# Patient Record
Sex: FEMALE | Race: BLACK OR AFRICAN AMERICAN | NOT HISPANIC OR LATINO | Employment: OTHER | URBAN - METROPOLITAN AREA
[De-identification: names, ages, dates, MRNs, and addresses within clinical notes are randomized per-mention and may not be internally consistent; named-entity substitution may affect disease eponyms.]

---

## 2017-01-06 RX ORDER — INSULIN GLARGINE 100 [IU]/ML
INJECTION, SOLUTION SUBCUTANEOUS
Qty: 1 BOX | Refills: 0 | OUTPATIENT
Start: 2017-01-06

## 2017-01-09 ENCOUNTER — TELEPHONE (OUTPATIENT)
Dept: FAMILY MEDICINE | Facility: CLINIC | Age: 57
End: 2017-01-09

## 2017-01-11 RX ORDER — INSULIN GLARGINE 100 [IU]/ML
INJECTION, SOLUTION SUBCUTANEOUS
Qty: 4 BOX | Refills: 5 | Status: SHIPPED | OUTPATIENT
Start: 2017-01-11 | End: 2017-01-31 | Stop reason: SDUPTHER

## 2017-01-30 ENCOUNTER — DOCUMENTATION ONLY (OUTPATIENT)
Dept: FAMILY MEDICINE | Facility: CLINIC | Age: 57
End: 2017-01-30

## 2017-01-30 NOTE — PROGRESS NOTES
Pre-Visit Chart Review  For Appointment Scheduled on 1-31-17    Health Maintenance Due   Topic Date Due    TETANUS VACCINE  01/08/1976    Colonoscopy  01/08/2008    Eye Exam  06/01/2016    Lipid Panel  06/05/2016    Hemoglobin A1c  07/22/2016    Influenza Vaccine  08/01/2016    Foot Exam  01/27/2017    Urine Microalbumin  01/27/2017

## 2017-01-31 ENCOUNTER — OFFICE VISIT (OUTPATIENT)
Dept: FAMILY MEDICINE | Facility: CLINIC | Age: 57
End: 2017-01-31
Payer: MEDICARE

## 2017-01-31 VITALS
BODY MASS INDEX: 27.2 KG/M2 | DIASTOLIC BLOOD PRESSURE: 100 MMHG | WEIGHT: 134.94 LBS | SYSTOLIC BLOOD PRESSURE: 190 MMHG | TEMPERATURE: 98 F | HEIGHT: 59 IN | HEART RATE: 73 BPM

## 2017-01-31 DIAGNOSIS — D50.9 IRON DEFICIENCY ANEMIA, UNSPECIFIED IRON DEFICIENCY ANEMIA TYPE: ICD-10-CM

## 2017-01-31 DIAGNOSIS — I10 HYPERTENSION, UNCONTROLLED: ICD-10-CM

## 2017-01-31 DIAGNOSIS — E11.9 TYPE II OR UNSPECIFIED TYPE DIABETES MELLITUS WITHOUT MENTION OF COMPLICATION, NOT STATED AS UNCONTROLLED: Primary | ICD-10-CM

## 2017-01-31 DIAGNOSIS — J44.9 CHRONIC OBSTRUCTIVE PULMONARY DISEASE, UNSPECIFIED COPD TYPE: ICD-10-CM

## 2017-01-31 PROCEDURE — 3080F DIAST BP >= 90 MM HG: CPT | Mod: S$GLB,,, | Performed by: FAMILY MEDICINE

## 2017-01-31 PROCEDURE — 99214 OFFICE O/P EST MOD 30 MIN: CPT | Mod: S$GLB,,, | Performed by: FAMILY MEDICINE

## 2017-01-31 PROCEDURE — 2022F DILAT RTA XM EVC RTNOPTHY: CPT | Mod: S$GLB,,, | Performed by: FAMILY MEDICINE

## 2017-01-31 PROCEDURE — 3077F SYST BP >= 140 MM HG: CPT | Mod: S$GLB,,, | Performed by: FAMILY MEDICINE

## 2017-01-31 PROCEDURE — 99999 PR PBB SHADOW E&M-EST. PATIENT-LVL III: CPT | Mod: PBBFAC,,, | Performed by: FAMILY MEDICINE

## 2017-01-31 PROCEDURE — 3045F PR MOST RECENT HEMOGLOBIN A1C LEVEL 7.0-9.0%: CPT | Mod: S$GLB,,, | Performed by: FAMILY MEDICINE

## 2017-01-31 PROCEDURE — 4010F ACE/ARB THERAPY RXD/TAKEN: CPT | Mod: S$GLB,,, | Performed by: FAMILY MEDICINE

## 2017-01-31 PROCEDURE — 99499 UNLISTED E&M SERVICE: CPT | Mod: S$PBB,,, | Performed by: FAMILY MEDICINE

## 2017-01-31 PROCEDURE — 1159F MED LIST DOCD IN RCRD: CPT | Mod: S$GLB,,, | Performed by: FAMILY MEDICINE

## 2017-01-31 RX ORDER — AMLODIPINE AND VALSARTAN 10; 320 MG/1; MG/1
1 TABLET ORAL DAILY
Qty: 90 TABLET | Refills: 3 | Status: SHIPPED | OUTPATIENT
Start: 2017-01-31 | End: 2018-01-24 | Stop reason: SDUPTHER

## 2017-01-31 RX ORDER — METOPROLOL SUCCINATE 50 MG/1
50 TABLET, EXTENDED RELEASE ORAL DAILY
Qty: 90 TABLET | Refills: 3 | Status: SHIPPED | OUTPATIENT
Start: 2017-01-31 | End: 2017-02-17 | Stop reason: DRUGHIGH

## 2017-01-31 RX ORDER — INSULIN GLARGINE 100 [IU]/ML
INJECTION, SOLUTION SUBCUTANEOUS
Qty: 12 BOX | Refills: 3 | Status: SHIPPED | OUTPATIENT
Start: 2017-01-31 | End: 2017-03-03 | Stop reason: SDUPTHER

## 2017-01-31 RX ORDER — METFORMIN HYDROCHLORIDE 500 MG/1
TABLET, EXTENDED RELEASE ORAL
Qty: 360 TABLET | Refills: 1 | Status: SHIPPED | OUTPATIENT
Start: 2017-01-31 | End: 2018-01-24 | Stop reason: SDUPTHER

## 2017-01-31 RX ORDER — TIOTROPIUM BROMIDE 18 UG/1
18 CAPSULE ORAL; RESPIRATORY (INHALATION) DAILY
Qty: 90 CAPSULE | Refills: 1 | Status: SHIPPED | OUTPATIENT
Start: 2017-01-31 | End: 2017-05-17 | Stop reason: SDUPTHER

## 2017-01-31 RX ORDER — HYDROCHLOROTHIAZIDE 25 MG/1
25 TABLET ORAL DAILY
Qty: 90 TABLET | Refills: 3 | Status: SHIPPED | OUTPATIENT
Start: 2017-01-31 | End: 2017-03-31

## 2017-01-31 NOTE — PROGRESS NOTES
Subjective:       Patient ID: Steff Johnson is a 57 y.o. female.    Chief Complaint: Establish Care    Patient Active Problem List   Diagnosis    Hypertension    Diabetes mellitus    COPD (chronic obstructive pulmonary disease)    Type II or unspecified type diabetes mellitus without mention of complication, not stated as uncontrolled    Chronic pain    Visit for screening mammogram    Screening for colon cancer    BMI 27.0-27.9,adult   Patient here to establish care.  Has not been on any of her regular medications other than Lantus at 40 units per day.  BS ranging 100-325 depending on her diet.  Has h/o non compliance and poor control of her chronic conditions.  LOV 2/16    HPI  Review of Systems   Constitutional: Negative for fatigue and unexpected weight change.   Respiratory: Negative for chest tightness and shortness of breath.    Cardiovascular: Negative for chest pain, palpitations and leg swelling.   Gastrointestinal: Negative for abdominal pain.   Endocrine: Negative for polydipsia, polyphagia and polyuria.   Musculoskeletal: Negative for arthralgias.   Neurological: Negative for dizziness, syncope, light-headedness and headaches.       Objective:      Physical Exam   Constitutional: She is oriented to person, place, and time. She appears well-developed and well-nourished.   Cardiovascular: Normal rate, regular rhythm and normal heart sounds.    Pulmonary/Chest: Effort normal and breath sounds normal.   Musculoskeletal: She exhibits no edema.   Neurological: She is alert and oriented to person, place, and time.   Skin: Skin is warm and dry.   Psychiatric: She has a normal mood and affect.   Nursing note and vitals reviewed.      Assessment:       1. Type II or unspecified type diabetes mellitus without mention of complication, not stated as uncontrolled    2. Hypertension, uncontrolled    3. Chronic obstructive pulmonary disease, unspecified COPD type    4. Iron deficiency anemia, unspecified iron  deficiency anemia type        Plan:       1. Hypertension, uncontrolled  D/c clonidine.  Restart:  - amlodipine-valsartan (EXFORGE)  mg per tablet; Take 1 tablet by mouth once daily.  Dispense: 90 tablet; Refill: 3  - hydrochlorothiazide (HYDRODIURIL) 25 MG tablet; Take 1 tablet (25 mg total) by mouth once daily.  Dispense: 90 tablet; Refill: 3  - metoprolol succinate (TOPROL-XL) 50 MG 24 hr tablet; Take 1 tablet (50 mg total) by mouth once daily.  Dispense: 90 tablet; Refill: 3  I counseled the patient on HTN education, management and recommendations.  I recommended weight loss toward a BMI < 25, avoidance of salt and the DASH diet, regular cardio exercise a minimum of 150 minutes per week and medications if indicated.  Printed materials were given.    2. Type II or unspecified type diabetes mellitus without mention of complication, not stated as uncontrolled  D/c tradjenta.  Restart:  - metformin (GLUCOPHAGE-XR) 500 MG 24 hr tablet; 2 TABLETS TWICE DAILY WITH MEALS  Dispense: 360 tablet; Refill: 1  Continue  - insulin glargine (LANTUS SOLOSTAR) 100 unit/mL (3 mL) InPn pen; ADMINISTER 40 UNITS UNDER THE SKIN DAILY  Dispense: 12 Box; Refill: 3  - Hemoglobin A1c; Future  - Comprehensive metabolic panel; Future  - Microalbumin/creatinine urine ratio; Future  Keep BS log and check 4 times day premeal and qhs.      3. Chronic obstructive pulmonary disease, unspecified COPD type  Restart:  - tiotropium (SPIRIVA WITH HANDIHALER) 18 mcg inhalation capsule; Inhale 1 capsule (18 mcg total) into the lungs once daily.  Dispense: 90 capsule; Refill: 1    4. Iron deficiency anemia, unspecified iron deficiency anemia type  Screen and treat as indicated:    - CBC auto differential; Future  - Ferritin; Future  - Iron and TIBC; Future      [facility] goal documentation:  Patient readiness: acceptance and barriers:readiness  During the course of the visit the patient was educated and counseled about the following: Diabetes:   Discussed general issues about diabetes pathophysiology and management.  Hypertension:   Dietary sodium restriction.  Regular aerobic exercise.  Goals: Diabetes: Maintain Hemoglobin A1C below 7 and Hypertension: Reduce Blood Pressure  Goal/Outcomes met:none  The following self management tools provided:none  Patient Instructions (the written plan) was given to the patient/family: Yes  Time spent with patient: 40 minutes    Patient with be reevaluated in 3 months or sooner prn.  reeval 2 weeks with KLARISSA Queen for BP and BS recheck.      Greater than 50% of this visit was spent counseling as described in above documentation:Yes

## 2017-01-31 NOTE — PATIENT INSTRUCTIONS

## 2017-01-31 NOTE — MR AVS SNAPSHOT
[facility]  [address]  [place] [address]  Phone: [phone]  Fax: [phone]                  Steff Johnson   2017 11:00 AM   Office Visit    Description:  Female : 1960   Provider:  Cristina Ren MD   Department:  [facility]           Reason for Visit     Establish Care           Diagnoses this Visit        Comments    Type II or unspecified type diabetes mellitus without mention of complication, not stated as uncontrolled    -  Primary     Hypertension, uncontrolled         Chronic obstructive pulmonary disease, unspecified COPD type         Iron deficiency anemia, unspecified iron deficiency anemia type                To Do List           Future Appointments        Provider Department Dept Phone    2017 11:00 AM Cristina Ren MD [facility] [phone]    2017 8:00 AM LAB, [facility] [phone]    2017 8:15 AM SPECIMEN, [facility] - Lab [phone]    2017 9:00 AM Rita Queen PA-C [facility] [phone]    2017 9:40 AM Cristina Ren MD [facility] [phone]      Goals (5 Years of Data)     None      Follow-Up and Disposition     Return in about 3 months (around 2017).       These Medications        Disp Refills Start End    amlodipine-valsartan (EXFORGE)  mg per tablet 90 tablet 3 2017     Take 1 tablet by mouth once daily. - Oral    Pharmacy: [facility] [address] [place] [address] Ph #: [phone]       hydrochlorothiazide (HYDRODIURIL) 25 MG tablet 90 tablet 3 2017     Take 1 tablet (25 mg total) by mouth once daily. - Oral    Pharmacy: [facility] [address] [place] [address] [facility] Ph #: [phone]       metoprolol succinate (TOPROL-XL) 50 MG 24 hr tablet 90 tablet 3 2017    Take 1 tablet (50 mg  total) by mouth once daily. - Oral    Pharmacy: [address] Ph #: [phone]       metformin (GLUCOPHAGE-XR) 500 MG 24 hr tablet 360 tablet 1 1/31/2017     2 TABLETS TWICE DAILY WITH MEALS    Pharmacy: [address] Ph #: [phone]       insulin glargine (LANTUS SOLOSTAR) 100 unit/mL (3 mL) InPn pen 12 Box 3 1/31/2017     ADMINISTER 40 UNITS UNDER THE SKIN DAILY    Pharmacy: [address] Ph #: [phone]       tiotropium (SPIRIVA WITH HANDIHALER) 18 mcg inhalation capsule 90 capsule 1 1/31/2017     Inhale 1 capsule (18 mcg total) into the lungs once daily. - Inhalation    Pharmacy: [address] Ph #: [phone]         [facility]     [facility] - 24/7 Assistance  Registered nurses in the [facility] provide clinical advisement, health education, appointment booking, and other advisory services.  Call for this free service at [phone].             Medications           Message regarding Medications     Verify the changes and/or additions to your medication regime listed below are the same as discussed with your clinician today.  If any of these changes or additions are incorrect, please notify your healthcare provider.        STOP taking these medications     cloNIDine (CATAPRES) 0.3 MG tablet Take 1 tablet (0.3 mg total) by mouth 2 (two) times daily.    linagliptin (TRADJENTA) 5 mg Tab tablet Take 1 tablet (5 mg total) by mouth once daily.    estrogens conjugated, synthetic B, (ENJUVIA) 0.625 MG tablet Take 0.625 mg by mouth once daily.      ferrous sulfate 325 (65 FE) MG EC tablet Take 325 mg by mouth 3 (three) times daily with meals.           Verify that the below  "list of medications is an accurate representation of the medications you are currently taking.  If none reported, the list may be blank. If incorrect, please contact your healthcare provider. Carry this list with you in case of emergency.           Current Medications     amlodipine-valsartan (EXFORGE)  mg per tablet Take 1 tablet by mouth once daily.    BD INSULIN PEN NEEDLE UF SHORT 31 gauge x 5/16" Ndle USE EVERY DAY WITH LANTUS    hydrochlorothiazide (HYDRODIURIL) 25 MG tablet Take 1 tablet (25 mg total) by mouth once daily.    hydrocodone-acetaminophen 10-325mg (NORCO)  mg Tab     insulin glargine (LANTUS SOLOSTAR) 100 unit/mL (3 mL) InPn pen ADMINISTER 40 UNITS UNDER THE SKIN DAILY    lidocaine-prilocaine (EMLA) cream     metformin (GLUCOPHAGE-XR) 500 MG 24 hr tablet 2 TABLETS TWICE DAILY WITH MEALS    metoprolol succinate (TOPROL-XL) 50 MG 24 hr tablet Take 1 tablet (50 mg total) by mouth once daily.    tiotropium (SPIRIVA WITH HANDIHALER) 18 mcg inhalation capsule Inhale 1 capsule (18 mcg total) into the lungs once daily.    zolpidem (AMBIEN) 10 mg tablet Take 10 mg by mouth nightly as needed.             Clinical Reference Information           Vital Signs - Last Recorded  Most recent update: 1/31/2017 10:28 AM by Kathy Orozco MA    BP Pulse Temp Ht Wt BMI    (!) 190/100 (BP Location: Right arm, Patient Position: Sitting, BP Method: Manual) 73 98 °F (36.7 °C) (Oral) 4' 11" (1.499 m) 61.2 kg (134 lb 14.7 oz) 27.25 kg/m2      Blood Pressure          Most Recent Value    BP  (!)  190/100      Allergies as of 1/31/2017     No Known Allergies      Immunizations Administered on Date of Encounter - 1/31/2017     None      Orders Placed During Today's Visit     Future Labs/Procedures Expected by Expires    CBC auto differential  1/31/2017 4/1/2018    Comprehensive metabolic panel  1/31/2017 4/1/2018    Ferritin  1/31/2017 4/1/2018    Hemoglobin A1c  1/31/2017 4/1/2018    Iron and TIBC  1/31/2017 " 4/1/2018    Microalbumin/creatinine urine ratio  1/31/2017 4/1/2018      [facility] Sign-Up     Activating your [facility] account is as easy as 1-2-3!     1) Visit [email or web address], select Sign Up Now, enter this activation code and your date of birth, then select Next.  [email or web address]  Expires: 3/17/2017 10:47 AM      2) Create a username and password to use when you visit [facility] in the future and select a security question in case you lose your password and select Next.    3) Enter your e-mail address and click Sign Up!    Additional Information  If you have questions, please e-mail [email or web address] or call [phone] to talk to our [facility] staff. Remember, [facility] is NOT to be used for urgent needs. For medical emergencies, dial 911.         Instructions      Diabetes (General Information)  Diabetes is a long-term health problem. It means your body does not make enough insulin. Or it may mean that your body cannot use the insulin it makes. Insulin is a hormone in your body. It lets blood sugar (glucose) reach the cells in your body. All of your cells need glucose for fuel.  When you have diabetes, the glucose in your blood builds up because it cannot get into the cells. This buildup is called high blood sugar (hyperglycemia).  Your blood sugar level depends on several things. It depends on what kind of food you eat and how much of it you eat. It also depends on how much exercise you get, and how much insulin you have in your body. Eating too much of the wrong kinds of food or not taking diabetes medicine on time can cause high blood sugar. Infections can cause high blood sugar even if you are taking medicines correctly.  These things can also cause low blood sugar:  · Missing meals  · Not eating enough food  · Taking too much diabetes medicine  Diabetes can cause serious problems over time if you do not get treated. These problems include heart disease, stroke, kidney failure, and blindness. They  also include nerve pain or loss of feeling in your legs and feet, and gangrene of the feet. By keeping your blood sugar under control you can prevent or delay these problems.  Normal blood sugar levels are 80 to 100 before a meal and less than 180 in the 1 to 2 hours after a meal.  Home care  Follow these guidelines when caring for yourself at home:  · Follow the diet your healthcare provider gives you. Take insulin or other diabetes medicine exactly as told to.  · Watch your blood sugar as you are told to. Keep a log of your results. This will help your provider change your medicines to keep your blood sugar under control.  · Try to reach your ideal weight. You may be able to cut back on or not have to take diabetes medicine if you eat the right foods and get exercise.  · Do not smoke. Smoking worsens the effects of diabetes on your circulation. You are much more likely to have a heart attack if you have diabetes and you smoke.  · Take good care of your feet. If you have lost feeling in your feet, you may not see an injury or infection. Check your feet and between your toes at least once a week.  · Wear a medical alert bracelet or necklace, or carry a card in your wallet that says you have diabetes. This will help healthcare providers give you the right care if you get very ill and cannot tell them that you have diabetes.  Sick day plan  If you get a cold, the flu, or a bacterial or viral infection, take these steps:  · Look at your diabetes sick plan and call your healthcare provider as you were told to. You may need to call your provider right away if:  ¨ Your blood sugar is above 240 while taking your diabetes medicine  ¨ Your urine ketone levels are above normal or high  ¨ You have been vomiting more than 6 hours  ¨ You have trouble breathing or your breath ha s a fruity smell  ¨ You have a high fever  ¨ You have a fever for several days and you are not getting better  ¨ You get light-headed and are sleepier  than usual  · Keep taking your diabetes pills (oral medicine) even if you have been vomiting and are feeling sick. Call your provider right away because you may need insulin to lower your blood sugar until you recover from your illness.  · Keep taking your insulin even if you have been vomiting and are feeling sick. Call your provider right away to ask if you need to change your insulin dose. This will depend on your blood sugar results.  · Check your blood sugar every 2 to 4 hours, or at least 4 times a day.  · Check your ketones often. If you are vomiting and having diarrhea, watch them more often.  · Do not skip meals. Try to eat small meals on a regular schedule. Do this even if you do not feel like eating.  · Drink water or other liquids that do not have caffeine or calories. This will keep you from getting dehydrated. If you are nauseated or vomiting, takes small sips every 5 minutes. To prevent dehydration try to drink a cup (8 ounces) of fluids every hour while you are awake.  General care  Always bring a source of fast-acting sugar with you in case you have symptoms of low blood sugar (below 70). At the first sign of low blood sugar, eat or drink 15 to 20 grams of fast-acting sugar to raise your blood sugar. Examples are:  · 3 to 4 glucose tablets. You can buy these at most drugstores.  · 4 ounces (1/2 cup) of regular (not diet) soft drinks  · 4 ounces (1/2 cup) of any fruit juice  · 8 ounces (1 cup) of milk  · 5 to 6 pieces of hard candy  · 1 tablespoon of honey  Check your blood sugar 15 minutes after treating yourself. If it is still below 70, take 15 to 20 more grams of fast-acting sugar. Test again in 15 minutes. If it returns to normal (70 or above), eat a snack or meal to keep your blood sugar in a safe range. If it stays low, call your doctor or go to an emergency room.  Follow-up care  Follow-up with your healthcare provider, or as advised. For more information about diabetes, visit the American  Diabetes Association website at www.diabetes.org or call 889-870-5277.  When to seek medical advice  Call your healthcare provider right away if you have any of these symptoms of high blood sugar:  · Frequent urination  · Dizziness  · Drowsiness  · Thirst  · Headache  · Nausea or vomiting  · Abdominal pain  · Eyesight changes  · Fast breathing  · Confusion or loss of consciousness  Also call your provider right away if you have any of these signs of low blood sugar:  · Fatigue  · Headache  · Shakes  · Excess sweating  · Hunger  · Feeling anxious or restless  · Eyesight changes  · Drowsiness  · Weakness  · Confusion or loss of consciousness  Call 911  Call for emergency help right away if any of these occur:  · Chest pain or shortness of breath  · Dizziness or fainting  · Weakness of an arm or leg or one side of the face  · Trouble speaking or seeing   © 2211-8599 [employer]. [address] [place] [address]. All rights reserved. This information is not intended as a substitute for professional medical care. Always follow your healthcare professional's instructions.        Controlling High Blood Pressure  High blood pressure (hypertension) is often called the silent killer. This is because many people who have it dont know it. High blood pressure is defined as 140/90 mm Hg or higher. Know your blood pressure and remember to check it regularly. Doing so can save your life. Here are some things you can do to help control your blood pressure.    Choose heart-healthy foods  · Select low-salt, low-fat foods. Limit sodium intake to 2,400 mg per day or the amount suggested by your healthcare provider.  · Limit canned, dried, cured, packaged, and fast foods. These can contain a lot of salt.  · Eat 8 to 10 servings of fruits and vegetables every day.  · Choose lean meats, fish, or chicken.  · Eat whole-grain pasta, brown rice, and beans.  · Eat 2 to 3 servings of low-fat or fat-free dairy  products.  · Ask your doctor about the DASH eating plan. This plan helps reduce blood pressure.  · When you go to a restaurant, ask that your meal be prepared with no added salt.  Maintain a healthy weight  · Ask your healthcare provider how many calories to eat a day. Then stick to that number.  · Ask your healthcare provider what weight range is healthiest for you. If you are overweight, a weight loss of only 3% to 5% of your body weight can help lower blood pressure. Generally, a good weight loss goal is to lose 10% of your body weight in a year.  · Limit snacks and sweets.  · Get regular exercise.  Get up and get active  · Choose activities you enjoy. Find ones you can do with friends or family. This includes bicycling, dancing, walking, and jogging.  · Park farther away from building entrances.  · Use stairs instead of the elevator.  · When you can, walk or bike instead of driving.  · [place] leaves, garden, or do household repairs.  · Be active at a moderate to vigorous level of physical activity for at least 40 minutes for a minimum of 3 to 4 days a week.   Manage stress  · Make time to relax and enjoy life. Find time to laugh.  · Communicate your concerns with your loved ones and your healthcare provider.  · Visit with family and friends, and keep up with hobbies.  Limit alcohol and quit smoking  · Men should have no more than 2 drinks per day.  · Women should have no more than 1 drink per day.  · Talk with your healthcare provider about quitting smoking. Smoking significantly increases your risk for heart disease and stroke. Ask your healthcare provider about community smoking cessation programs and other options.  Medicines  If lifestyle changes arent enough, your healthcare provider may prescribe high blood pressure medicine. Take all medicines as prescribed. If you have any questions about your medicines, ask your healthcare provider before stopping or changing them.   © 4152-8541 [employer] [address] [place] [address]. All rights reserved. This information is not intended as a substitute for professional medical care. Always follow your healthcare professional's instructions.

## 2017-02-09 ENCOUNTER — LAB VISIT (OUTPATIENT)
Dept: LAB | Facility: HOSPITAL | Age: 57
End: 2017-02-09
Attending: FAMILY MEDICINE
Payer: MEDICARE

## 2017-02-09 DIAGNOSIS — D50.9 IRON DEFICIENCY ANEMIA, UNSPECIFIED IRON DEFICIENCY ANEMIA TYPE: ICD-10-CM

## 2017-02-09 DIAGNOSIS — E11.9 TYPE II OR UNSPECIFIED TYPE DIABETES MELLITUS WITHOUT MENTION OF COMPLICATION, NOT STATED AS UNCONTROLLED: ICD-10-CM

## 2017-02-09 LAB
ALBUMIN SERPL BCP-MCNC: 3.4 G/DL
ALP SERPL-CCNC: 100 U/L
ALT SERPL W/O P-5'-P-CCNC: 12 U/L
ANION GAP SERPL CALC-SCNC: 7 MMOL/L
AST SERPL-CCNC: 16 U/L
BASOPHILS # BLD AUTO: 0.04 K/UL
BASOPHILS NFR BLD: 0.5 %
BILIRUB SERPL-MCNC: 0.5 MG/DL
BUN SERPL-MCNC: 17 MG/DL
CALCIUM SERPL-MCNC: 9.2 MG/DL
CHLORIDE SERPL-SCNC: 106 MMOL/L
CO2 SERPL-SCNC: 26 MMOL/L
CREAT SERPL-MCNC: 1 MG/DL
DIFFERENTIAL METHOD: ABNORMAL
EOSINOPHIL # BLD AUTO: 0.1 K/UL
EOSINOPHIL NFR BLD: 1.7 %
ERYTHROCYTE [DISTWIDTH] IN BLOOD BY AUTOMATED COUNT: 13.7 %
EST. GFR  (AFRICAN AMERICAN): >60 ML/MIN/1.73 M^2
EST. GFR  (NON AFRICAN AMERICAN): >60 ML/MIN/1.73 M^2
FERRITIN SERPL-MCNC: 487 NG/ML
GLUCOSE SERPL-MCNC: 178 MG/DL
HCT VFR BLD AUTO: 35.6 %
HGB BLD-MCNC: 12.7 G/DL
IRON SERPL-MCNC: 56 UG/DL
LYMPHOCYTES # BLD AUTO: 2.9 K/UL
LYMPHOCYTES NFR BLD: 35.1 %
MCH RBC QN AUTO: 27.3 PG
MCHC RBC AUTO-ENTMCNC: 35.7 %
MCV RBC AUTO: 77 FL
MONOCYTES # BLD AUTO: 0.8 K/UL
MONOCYTES NFR BLD: 9.4 %
NEUTROPHILS # BLD AUTO: 4.3 K/UL
NEUTROPHILS NFR BLD: 52.6 %
PLATELET # BLD AUTO: 386 K/UL
PMV BLD AUTO: 10.3 FL
POTASSIUM SERPL-SCNC: 3.7 MMOL/L
PROT SERPL-MCNC: 7.7 G/DL
RBC # BLD AUTO: 4.65 M/UL
SATURATED IRON: 16 %
SODIUM SERPL-SCNC: 139 MMOL/L
TOTAL IRON BINDING CAPACITY: 349 UG/DL
TRANSFERRIN SERPL-MCNC: 236 MG/DL
WBC # BLD AUTO: 8.17 K/UL

## 2017-02-09 PROCEDURE — 80053 COMPREHEN METABOLIC PANEL: CPT

## 2017-02-09 PROCEDURE — 36415 COLL VENOUS BLD VENIPUNCTURE: CPT | Mod: PO

## 2017-02-09 PROCEDURE — 83036 HEMOGLOBIN GLYCOSYLATED A1C: CPT

## 2017-02-09 PROCEDURE — 82728 ASSAY OF FERRITIN: CPT

## 2017-02-09 PROCEDURE — 85025 COMPLETE CBC W/AUTO DIFF WBC: CPT

## 2017-02-09 PROCEDURE — 83540 ASSAY OF IRON: CPT

## 2017-02-10 ENCOUNTER — DOCUMENTATION ONLY (OUTPATIENT)
Dept: FAMILY MEDICINE | Facility: CLINIC | Age: 57
End: 2017-02-10

## 2017-02-10 LAB
ESTIMATED AVG GLUCOSE: 269 MG/DL
HBA1C MFR BLD HPLC: 11 %

## 2017-02-10 NOTE — PROGRESS NOTES
Pre-Visit Chart Review  For Appointment Scheduled on 02/14/2017    Health Maintenance Due   Topic Date Due    TETANUS VACCINE  01/08/1976    Colonoscopy  01/08/2008    Eye Exam  06/01/2016    Lipid Panel  06/05/2016    Hemoglobin A1c  07/22/2016    Influenza Vaccine  08/01/2016    Foot Exam  01/27/2017

## 2017-02-14 ENCOUNTER — OFFICE VISIT (OUTPATIENT)
Dept: FAMILY MEDICINE | Facility: CLINIC | Age: 57
End: 2017-02-14
Payer: MEDICARE

## 2017-02-14 VITALS
HEART RATE: 61 BPM | WEIGHT: 134.69 LBS | BODY MASS INDEX: 27.15 KG/M2 | SYSTOLIC BLOOD PRESSURE: 170 MMHG | DIASTOLIC BLOOD PRESSURE: 90 MMHG | HEIGHT: 59 IN | TEMPERATURE: 98 F

## 2017-02-14 DIAGNOSIS — Z23 FLU VACCINE NEED: ICD-10-CM

## 2017-02-14 DIAGNOSIS — I10 ESSENTIAL HYPERTENSION: Primary | ICD-10-CM

## 2017-02-14 DIAGNOSIS — Z79.4 TYPE 2 DIABETES MELLITUS WITH COMPLICATION, WITH LONG-TERM CURRENT USE OF INSULIN: ICD-10-CM

## 2017-02-14 DIAGNOSIS — E11.8 TYPE 2 DIABETES MELLITUS WITH COMPLICATION, WITH LONG-TERM CURRENT USE OF INSULIN: ICD-10-CM

## 2017-02-14 PROCEDURE — 2022F DILAT RTA XM EVC RTNOPTHY: CPT | Mod: S$GLB,,, | Performed by: PHYSICIAN ASSISTANT

## 2017-02-14 PROCEDURE — G0008 ADMIN INFLUENZA VIRUS VAC: HCPCS | Mod: S$GLB,,, | Performed by: FAMILY MEDICINE

## 2017-02-14 PROCEDURE — 99214 OFFICE O/P EST MOD 30 MIN: CPT | Mod: S$GLB,,, | Performed by: PHYSICIAN ASSISTANT

## 2017-02-14 PROCEDURE — 99499 UNLISTED E&M SERVICE: CPT | Mod: S$PBB,,, | Performed by: PHYSICIAN ASSISTANT

## 2017-02-14 PROCEDURE — 3078F DIAST BP <80 MM HG: CPT | Mod: S$GLB,,, | Performed by: PHYSICIAN ASSISTANT

## 2017-02-14 PROCEDURE — 4010F ACE/ARB THERAPY RXD/TAKEN: CPT | Mod: S$GLB,,, | Performed by: PHYSICIAN ASSISTANT

## 2017-02-14 PROCEDURE — 3046F HEMOGLOBIN A1C LEVEL >9.0%: CPT | Mod: S$GLB,,, | Performed by: PHYSICIAN ASSISTANT

## 2017-02-14 PROCEDURE — 3077F SYST BP >= 140 MM HG: CPT | Mod: S$GLB,,, | Performed by: PHYSICIAN ASSISTANT

## 2017-02-14 PROCEDURE — 90686 IIV4 VACC NO PRSV 0.5 ML IM: CPT | Mod: S$GLB,,, | Performed by: FAMILY MEDICINE

## 2017-02-14 PROCEDURE — 99999 PR PBB SHADOW E&M-EST. PATIENT-LVL IV: CPT | Mod: PBBFAC,,, | Performed by: PHYSICIAN ASSISTANT

## 2017-02-14 RX ORDER — GABAPENTIN 300 MG/1
CAPSULE ORAL
Refills: 1 | COMMUNITY
Start: 2017-02-07 | End: 2018-07-19

## 2017-02-14 NOTE — MR AVS SNAPSHOT
"    [facility]  [address] [address]  Phone: [phone]  Fax: [phone]                  Steff Johnson   2017 9:00 AM   Office Visit    Description:  Female : 1960   Provider:  Rita Queen PA-C   Department:  [facility] Medicine           Reason for Visit     Follow-up           Diagnoses this Visit        Comments    Flu vaccine need    -  Primary            To Do List           Future Appointments        Provider Department Dept Phone    2017 8:40 AM Rita Queen PA-C [facility] [phone]    3/14/2017 8:40 AM Rita Queen PA-C [facility] [phone]    2017 9:40 AM Cristina Ren MD [facility] [phone]      Goals (5 Years of Data)     None      Follow-Up and Disposition     Return in about 4 weeks (around 3/14/2017).      [facility] On [facility]     [facility] On Call Nurse [facility] - 24/7 Assistance  Registered nurses in the [facility] Center provide clinical advisement, health education, appointment booking, and other advisory services.  Call for this free service at [phone].             Medications           Message regarding Medications     Verify the changes and/or additions to your medication regime listed below are the same as discussed with your clinician today.  If any of these changes or additions are incorrect, please notify your healthcare provider.             Verify that the below list of medications is an accurate representation of the medications you are currently taking.  If none reported, the list may be blank. If incorrect, please contact your healthcare provider. Carry this list with you in case of emergency.           Current Medications     amlodipine-valsartan (EXFORGE)  mg per tablet Take 1 tablet by mouth once daily.    BD INSULIN PEN NEEDLE UF SHORT 31 gauge x 5/16" Ndle USE EVERY DAY WITH LANTUS    gabapentin (NEURONTIN) 300 MG capsule TK 1 C PO HS    " "hydrochlorothiazide (HYDRODIURIL) 25 MG tablet Take 1 tablet (25 mg total) by mouth once daily.    hydrocodone-acetaminophen 10-325mg (NORCO)  mg Tab     insulin glargine (LANTUS SOLOSTAR) 100 unit/mL (3 mL) InPn pen ADMINISTER 40 UNITS UNDER THE SKIN DAILY    lidocaine-prilocaine (EMLA) cream     metformin (GLUCOPHAGE-XR) 500 MG 24 hr tablet 2 TABLETS TWICE DAILY WITH MEALS    metoprolol succinate (TOPROL-XL) 50 MG 24 hr tablet Take 1 tablet (50 mg total) by mouth once daily.    tiotropium (SPIRIVA WITH HANDIHALER) 18 mcg inhalation capsule Inhale 1 capsule (18 mcg total) into the lungs once daily.    zolpidem (AMBIEN) 10 mg tablet Take 10 mg by mouth nightly as needed.             Clinical Reference Information           Your Vitals Were     BP Pulse Temp Height Weight BMI    173/82 (BP Location: Right arm, Patient Position: Sitting, BP Method: Automatic) 61 98 °F (36.7 °C) (Oral) 4' 11" (1.499 m) 61.1 kg (134 lb 11.2 oz) 27.21 kg/m2      Blood Pressure          Most Recent Value    BP  (!)  173/82      Allergies as of 2/14/2017     No Known Allergies      Immunizations Administered on Date of Encounter - 2/14/2017     Name Date Dose VIS Date Route    influenza - Quadrivalent  Incomplete 0.5 mL 8/7/2015 Intramuscular      Orders Placed During Today's Visit      Normal Orders This Visit    Influenza - Quadrivalent (3 years & older)       [facility] Sign-Up     Activating your [facility] account is as easy as 1-2-3!     1) Visit [email or web address], select Sign Up Now, enter this activation code and your date of birth, then select Next.  [email or web address]  Expires: 3/17/2017 10:47 AM      2) Create a username and password to use when you visit [facility] in the future and select a security question in case you lose your password and select Next.    3) Enter your e-mail address and click Sign Up!    Additional Information  If you have questions, please e-mail [email or web address] or call [phone] to talk to our " [facility] staff. Remember, [facility] is NOT to be used for urgent needs. For medical emergencies, dial 911.         Instructions      Controlling High Blood Pressure  High blood pressure (hypertension) is often called the silent killer. This is because many people who have it dont know it. High blood pressure is defined as 140/90 mm Hg or higher. Know your blood pressure and remember to check it regularly. Doing so can save your life. Here are some things you can do to help control your blood pressure.    Choose heart-healthy foods  · Select low-salt, low-fat foods. Limit sodium intake to 2,400 mg per day or the amount suggested by your healthcare provider.  · Limit canned, dried, cured, packaged, and fast foods. These can contain a lot of salt.  · Eat 8 to 10 servings of fruits and vegetables every day.  · Choose lean meats, fish, or chicken.  · Eat whole-grain pasta, brown rice, and beans.  · Eat 2 to 3 servings of low-fat or fat-free dairy products.  · Ask your doctor about the DASH eating plan. This plan helps reduce blood pressure.  · When you go to a restaurant, ask that your meal be prepared with no added salt.  Maintain a healthy weight  · Ask your healthcare provider how many calories to eat a day. Then stick to that number.  · Ask your healthcare provider what weight range is healthiest for you. If you are overweight, a weight loss of only 3% to 5% of your body weight can help lower blood pressure. Generally, a good weight loss goal is to lose 10% of your body weight in a year.  · Limit snacks and sweets.  · Get regular exercise.  Get up and get active  · Choose activities you enjoy. Find ones you can do with friends or family. This includes bicycling, dancing, walking, and jogging.  · Park farther away from building entrances.  · Use stairs instead of the elevator.  · When you can, walk or bike instead of driving.  · [place] leaves, garden, or do household repairs.  · Be active at a moderate to vigorous  level of physical activity for at least 40 minutes for a minimum of 3 to 4 days a week.   Manage stress  · Make time to relax and enjoy life. Find time to laugh.  · Communicate your concerns with your loved ones and your healthcare provider.  · Visit with family and friends, and keep up with hobbies.  Limit alcohol and quit smoking  · Men should have no more than 2 drinks per day.  · Women should have no more than 1 drink per day.  · Talk with your healthcare provider about quitting smoking. Smoking significantly increases your risk for heart disease and stroke. Ask your healthcare provider about community smoking cessation programs and other options.  Medicines  If lifestyle changes arent enough, your healthcare provider may prescribe high blood pressure medicine. Take all medicines as prescribed. If you have any questions about your medicines, ask your healthcare provider before stopping or changing them.   Date Last Reviewed: 4/27/2016 © 2000-2016 [employer]. [address] [place] [address]. All rights reserved. This information is not intended as a substitute for professional medical care. Always follow your healthcare professional's instructions.        Diabetes (General Information)  Diabetes is a long-term health problem. It means your body does not make enough insulin. Or it may mean that your body cannot use the insulin it makes. Insulin is a hormone in your body. It lets blood sugar (glucose) reach the cells in your body. All of your cells need glucose for fuel.  When you have diabetes, the glucose in your blood builds up because it cannot get into the cells. This buildup is called high blood sugar (hyperglycemia).  Your blood sugar level depends on several things. It depends on what kind of food you eat and how much of it you eat. It also depends on how much exercise you get, and how much insulin you have in your body. Eating too much of the wrong kinds of food or not taking  diabetes medicine on time can cause high blood sugar. Infections can cause high blood sugar even if you are taking medicines correctly.  These things can also cause low blood sugar:  · Missing meals  · Not eating enough food  · Taking too much diabetes medicine  Diabetes can cause serious problems over time if you do not get treated. These problems include heart disease, stroke, kidney failure, and blindness. They also include nerve pain or loss of feeling in your legs and feet, and gangrene of the feet. By keeping your blood sugar under control you can prevent or delay these problems.  Normal blood sugar levels are 80 to 100 before a meal and less than 180 in the 1 to 2 hours after a meal.  Home care  Follow these guidelines when caring for yourself at home:  · Follow the diet your healthcare provider gives you. Take insulin or other diabetes medicine exactly as told to.  · Watch your blood sugar as you are told to. Keep a log of your results. This will help your provider change your medicines to keep your blood sugar under control.  · Try to reach your ideal weight. You may be able to cut back on or not have to take diabetes medicine if you eat the right foods and get exercise.  · Do not smoke. Smoking worsens the effects of diabetes on your circulation. You are much more likely to have a heart attack if you have diabetes and you smoke.  · Take good care of your feet. If you have lost feeling in your feet, you may not see an injury or infection. Check your feet and between your toes at least once a week.  · Wear a medical alert bracelet or necklace, or carry a card in your wallet that says you have diabetes. This will help healthcare providers give you the right care if you get very ill and cannot tell them that you have diabetes.  Sick day plan  If you get a cold, the flu, or a bacterial or viral infection, take these steps:  · Look at your diabetes sick plan and call your healthcare provider as you were told to.  You may need to call your provider right away if:  ¨ Your blood sugar is above 240 while taking your diabetes medicine  ¨ Your urine ketone levels are above normal or high  ¨ You have been vomiting more than 6 hours  ¨ You have trouble breathing or your breath ha s a fruity smell  ¨ You have a high fever  ¨ You have a fever for several days and you are not getting better  ¨ You get light-headed and are sleepier than usual  · Keep taking your diabetes pills (oral medicine) even if you have been vomiting and are feeling sick. Call your provider right away because you may need insulin to lower your blood sugar until you recover from your illness.  · Keep taking your insulin even if you have been vomiting and are feeling sick. Call your provider right away to ask if you need to change your insulin dose. This will depend on your blood sugar results.  · Check your blood sugar every 2 to 4 hours, or at least 4 times a day.  · Check your ketones often. If you are vomiting and having diarrhea, watch them more often.  · Do not skip meals. Try to eat small meals on a regular schedule. Do this even if you do not feel like eating.  · Drink water or other liquids that do not have caffeine or calories. This will keep you from getting dehydrated. If you are nauseated or vomiting, takes small sips every 5 minutes. To prevent dehydration try to drink a cup (8 ounces) of fluids every hour while you are awake.  General care  Always bring a source of fast-acting sugar with you in case you have symptoms of low blood sugar (below 70). At the first sign of low blood sugar, eat or drink 15 to 20 grams of fast-acting sugar to raise your blood sugar. Examples are:  · 3 to 4 glucose tablets. You can buy these at most drugstores.  · 4 ounces (1/2 cup) of regular (not diet) soft drinks  · 4 ounces (1/2 cup) of any fruit juice  · 8 ounces (1 cup) of milk  · 5 to 6 pieces of hard candy  · 1 tablespoon of honey  Check your blood sugar 15 minutes  after treating yourself. If it is still below 70, take 15 to 20 more grams of fast-acting sugar. Test again in 15 minutes. If it returns to normal (70 or above), eat a snack or meal to keep your blood sugar in a safe range. If it stays low, call your doctor or go to an emergency room.  Follow-up care  Follow-up with your healthcare provider, or as advised. For more information about diabetes, visit the American Diabetes Association website at www.diabetes.org or call 675-359-6672.  When to seek medical advice  Call your healthcare provider right away if you have any of these symptoms of high blood sugar:  · Frequent urination  · Dizziness  · Drowsiness  · Thirst  · Headache  · Nausea or vomiting  · Abdominal pain  · Eyesight changes  · Fast breathing  · Confusion or loss of consciousness  Also call your provider right away if you have any of these signs of low blood sugar:  · Fatigue  · Headache  · Shakes  · Excess sweating  · Hunger  · Feeling anxious or restless  · Eyesight changes  · Drowsiness  · Weakness  · Confusion or loss of consciousness  Call [phone]  Call for emergency help right away if any of these occur:  · Chest pain or shortness of breath  · Dizziness or fainting  · Weakness of an arm or leg or one side of the face  · Trouble speaking or seeing   Date Last Reviewed: 6/1/2016 © 2000-2016 [employer]. [address] [place] [address]. All rights reserved. This information is not intended as a substitute for professional medical care. Always follow your healthcare professional's instructions.             Language Assistance Services     ATTENTION: Language assistance services are available, free of charge. Please call [phone].      ATENCIÓN: Si habla español, tiene a otero disposición servicios gratuitos de asistencia lingüística. Llame al [phone].     CHÚ Ý: N?u b?n nói Ti?ng Vi?t, có các d?ch v? h? tr? ngôn ng? mi?n phí dành cho b?n. G?i s? [phone].          [facility] complies with applicable Federal civil rights laws and does not discriminate on the basis of race, color, national origin, age, disability, or sex.

## 2017-02-14 NOTE — PROGRESS NOTES
Subjective:       Patient ID: Steff Johnson is a 57 y.o. female.    Chief Complaint: Follow-up    HPI Comments: Mrs. Johnson is a 57 year old female who presents to clinic for follow up on uncontrolled hypertension and type 2 diabetes. Since last visit, she states her blood pressure has improved some. She forgot to bring her log of readings and states she did not take her medication this morning because she was rushing. She denies chest pain, shortness of breath, or lower ext edema. She states her blood sugar has improved and this morning fasting reading was 160. She states she has been more complaint with her nightly insulin. She is due for foot exam and influenza vaccine today. She is overdue for her retinal exam as well.     Review of Systems   Constitutional: Negative for activity change, appetite change, chills, fatigue and fever.   Eyes: Negative for visual disturbance.   Respiratory: Negative for cough and shortness of breath.    Cardiovascular: Negative for chest pain, palpitations and leg swelling.   Gastrointestinal: Negative for abdominal pain, constipation, diarrhea, nausea and vomiting.   Musculoskeletal: Negative for arthralgias.   Neurological: Negative for dizziness, weakness, light-headedness and headaches.       Objective:      Vitals:    02/14/17 0946   BP: (!) 170/90   Pulse:    Temp:      Physical Exam   Constitutional: She is oriented to person, place, and time. She appears well-developed and well-nourished.   HENT:   Head: Normocephalic and atraumatic.   Right Ear: Hearing and external ear normal.   Left Ear: Hearing and external ear normal.   Eyes: Conjunctivae and EOM are normal. Pupils are equal, round, and reactive to light.   Cardiovascular: Normal rate, regular rhythm, normal heart sounds and intact distal pulses.    Pulses:       Dorsalis pedis pulses are 2+ on the right side, and 2+ on the left side.        Posterior tibial pulses are 2+ on the right side, and 2+ on the left side.    Pulmonary/Chest: Effort normal and breath sounds normal.   Musculoskeletal:        Right foot: There is normal range of motion and no deformity.        Left foot: There is normal range of motion and no deformity.   Feet:   Right Foot:   Protective Sensation: 5 sites tested. 5 sites sensed.   Skin Integrity: Negative for ulcer, blister or skin breakdown.   Left Foot:   Protective Sensation: 5 sites tested. 5 sites sensed.   Skin Integrity: Negative for ulcer, blister or skin breakdown.   Neurological: She is alert and oriented to person, place, and time.   Skin: Skin is warm and dry.   Psychiatric: She has a normal mood and affect. Her behavior is normal.   Vitals reviewed.      Assessment:       1. Essential hypertension    2. Type 2 diabetes mellitus with complication, with long-term current use of insulin    3. Flu vaccine need        Plan:       Essential hypertension       - Uncontrolled due to non-compliance with medication. Pt to take BP medication as soon as she gets home. Return to clinic later this week for nursing BP visit only and bring readings to that appointment.         - Continue current medication regimen for now        - I counseled the patient on HTN education, management and recommendations.  I recommended weight loss toward a BMI < 25, avoidance of salt and the DASH diet, regular cardio exercise a minimum of 150 minutes per week and medications if indicated.  Printed materials were given.    Type 2 diabetes mellitus with complication, with long-term current use of insulin        - Blood glucose log provided with instructions to check BG 4 times daily and bring log into next visit.         - Continue current medications        - Foot exam completed today        - Reminded pt to obtain annual eye exam to rule out diabetic retinopathy    Flu vaccine need  -     Influenza - Quadrivalent (3 years & older) (PF)      Patient readiness: acceptance and barriers:none    During the course of the visit  the patient was educated and counseled about the following:     Diabetes:  Discussed foot care.  Hypertension:   Medication: no change.  Dietary sodium restriction.  Check blood pressures daily and record.    Goals: Diabetes: Maintain Hemoglobin A1C below 7 and Hypertension: Reduce Blood Pressure    Did patient meet goals/outcomes: No    The following self management tools provided: blood pressure log  blood glucose log    Patient Instructions (the written plan) was given to the patient/family.     Time spent with patient: 30 minutes    Follow up in 1 month  Pt declined referral for colonoscopy at this time.

## 2017-02-14 NOTE — NURSING
2 patient identifiers used (name & ). Administered Flu vaccine IM. Patient tolerated well, no bleeding at insertion site noted. Pain scale 0/10. Aseptic technique maintained. Immunization information given to patient. Advised patient to remain in clinic for 15 minutes to monitor for reaction. No AR noted.

## 2017-02-14 NOTE — PATIENT INSTRUCTIONS

## 2017-02-17 ENCOUNTER — TELEPHONE (OUTPATIENT)
Dept: FAMILY MEDICINE | Facility: CLINIC | Age: 57
End: 2017-02-17

## 2017-02-17 ENCOUNTER — CLINICAL SUPPORT (OUTPATIENT)
Dept: FAMILY MEDICINE | Facility: CLINIC | Age: 57
End: 2017-02-17
Payer: MEDICARE

## 2017-02-17 VITALS — DIASTOLIC BLOOD PRESSURE: 82 MMHG | HEART RATE: 69 BPM | SYSTOLIC BLOOD PRESSURE: 146 MMHG

## 2017-02-17 DIAGNOSIS — I10 UNCONTROLLED HYPERTENSION: Primary | ICD-10-CM

## 2017-02-17 PROCEDURE — 99999 PR PBB SHADOW E&M-EST. PATIENT-LVL III: CPT | Mod: PBBFAC,,, | Performed by: PHYSICIAN ASSISTANT

## 2017-02-17 RX ORDER — METOPROLOL SUCCINATE 100 MG/1
100 TABLET, EXTENDED RELEASE ORAL DAILY
Qty: 30 TABLET | Refills: 11 | Status: SHIPPED | OUTPATIENT
Start: 2017-02-17 | End: 2017-04-28 | Stop reason: CLARIF

## 2017-02-17 NOTE — TELEPHONE ENCOUNTER
Advised patient of plan to increase Toprol to 100mg and for her to continue to monitor her BP and also her pulse until she is seen in two weeks by KLARISSA Walters. She questioned taking two 50mg's until she runs out of them and then going to the 100's, Rita said this would be ok. Appointment rescheduled to 3/3/17 at 7am. Patient verbalized understanding.

## 2017-02-17 NOTE — NURSING
2 patient identifiers used (name & ). Patient came in for nurse blood pressure check. Automatic reading was 150/85 P- 69. Right arm resting on desk, feet flat on floor, back straight. Patient had no c/o pain. Patient stated that they took their prescribed blood pressure medication this am. Allergies and medications reviewed with the patient. Blood pressure re-checked after 5 minutes with manual cuff, reading was 146/82 . Patient advised to continue taking medications as prescribed and follow up as scheduled. Patient advised that message will be sent to the provider for recommendations and we will call with the reply.     Home readings: 17 am- 170/90                                15             176/75                                16             159/75          Pm- 174/74                                17             174/82    She is aware that there will be medication changes with readings and that when I call her I will set up a follow up Nurse BP appointment to monitor effectiveness.

## 2017-02-17 NOTE — TELEPHONE ENCOUNTER
Blood pressure remains uncontrolled and I would like to increase her toprol  mg daily. I have sent a prescription to the [facility]. Monitor BP and heart rate and bring her BP log to next visit. Please move up her follow up appointment for 2 weeks from now.

## 2017-03-02 ENCOUNTER — DOCUMENTATION ONLY (OUTPATIENT)
Dept: FAMILY MEDICINE | Facility: CLINIC | Age: 57
End: 2017-03-02

## 2017-03-02 NOTE — PROGRESS NOTES
Pre-Visit Chart Review  For Appointment Scheduled on 03/03/2017    Health Maintenance Due   Topic Date Due    TETANUS VACCINE  01/08/1976    Colonoscopy  01/08/2008    Eye Exam  06/01/2016    Lipid Panel  06/05/2016

## 2017-03-03 ENCOUNTER — HOSPITAL ENCOUNTER (OUTPATIENT)
Dept: RADIOLOGY | Facility: CLINIC | Age: 57
Discharge: HOME | End: 2017-03-03
Attending: FAMILY MEDICINE
Payer: MEDICARE

## 2017-03-03 ENCOUNTER — OFFICE VISIT (OUTPATIENT)
Dept: FAMILY MEDICINE | Facility: CLINIC | Age: 57
End: 2017-03-03
Payer: MEDICARE

## 2017-03-03 ENCOUNTER — TELEPHONE (OUTPATIENT)
Dept: FAMILY MEDICINE | Facility: CLINIC | Age: 57
End: 2017-03-03

## 2017-03-03 VITALS
RESPIRATION RATE: 18 BRPM | OXYGEN SATURATION: 97 % | HEART RATE: 62 BPM | TEMPERATURE: 98 F | HEIGHT: 59 IN | DIASTOLIC BLOOD PRESSURE: 92 MMHG | BODY MASS INDEX: 27.64 KG/M2 | WEIGHT: 137.13 LBS | SYSTOLIC BLOOD PRESSURE: 138 MMHG

## 2017-03-03 DIAGNOSIS — E11.9 TYPE II OR UNSPECIFIED TYPE DIABETES MELLITUS WITHOUT MENTION OF COMPLICATION, NOT STATED AS UNCONTROLLED: Primary | ICD-10-CM

## 2017-03-03 DIAGNOSIS — M25.561 ACUTE PAIN OF RIGHT KNEE: Primary | ICD-10-CM

## 2017-03-03 DIAGNOSIS — M25.561 ACUTE PAIN OF RIGHT KNEE: ICD-10-CM

## 2017-03-03 DIAGNOSIS — Z12.11 ENCOUNTER FOR SCREENING COLONOSCOPY: ICD-10-CM

## 2017-03-03 DIAGNOSIS — I10 ESSENTIAL HYPERTENSION: ICD-10-CM

## 2017-03-03 PROCEDURE — 1160F RVW MEDS BY RX/DR IN RCRD: CPT | Mod: S$GLB,,, | Performed by: PHYSICIAN ASSISTANT

## 2017-03-03 PROCEDURE — 99499 UNLISTED E&M SERVICE: CPT | Mod: S$PBB,,, | Performed by: PHYSICIAN ASSISTANT

## 2017-03-03 PROCEDURE — 2022F DILAT RTA XM EVC RTNOPTHY: CPT | Mod: S$GLB,,, | Performed by: PHYSICIAN ASSISTANT

## 2017-03-03 PROCEDURE — 99999 PR PBB SHADOW E&M-EST. PATIENT-LVL V: CPT | Mod: PBBFAC,,, | Performed by: PHYSICIAN ASSISTANT

## 2017-03-03 PROCEDURE — 99214 OFFICE O/P EST MOD 30 MIN: CPT | Mod: S$GLB,,, | Performed by: PHYSICIAN ASSISTANT

## 2017-03-03 PROCEDURE — 3078F DIAST BP <80 MM HG: CPT | Mod: S$GLB,,, | Performed by: PHYSICIAN ASSISTANT

## 2017-03-03 PROCEDURE — 73560 X-RAY EXAM OF KNEE 1 OR 2: CPT | Mod: TC,59,PO,LT

## 2017-03-03 PROCEDURE — 3046F HEMOGLOBIN A1C LEVEL >9.0%: CPT | Mod: S$GLB,,, | Performed by: PHYSICIAN ASSISTANT

## 2017-03-03 PROCEDURE — 73562 X-RAY EXAM OF KNEE 3: CPT | Mod: 26,RT,S$GLB, | Performed by: RADIOLOGY

## 2017-03-03 PROCEDURE — 3075F SYST BP GE 130 - 139MM HG: CPT | Mod: S$GLB,,, | Performed by: PHYSICIAN ASSISTANT

## 2017-03-03 PROCEDURE — 73560 X-RAY EXAM OF KNEE 1 OR 2: CPT | Mod: 26,59,LT,S$GLB | Performed by: RADIOLOGY

## 2017-03-03 PROCEDURE — 4010F ACE/ARB THERAPY RXD/TAKEN: CPT | Mod: S$GLB,,, | Performed by: PHYSICIAN ASSISTANT

## 2017-03-03 RX ORDER — INSULIN GLARGINE 100 [IU]/ML
INJECTION, SOLUTION SUBCUTANEOUS
Qty: 15 BOX | Refills: 1 | Status: SHIPPED | OUTPATIENT
Start: 2017-03-03 | End: 2017-05-17

## 2017-03-03 RX ORDER — PEN NEEDLE, DIABETIC 30 GX3/16"
NEEDLE, DISPOSABLE MISCELLANEOUS
Qty: 100 EACH | Refills: 5 | Status: SHIPPED | OUTPATIENT
Start: 2017-03-03 | End: 2017-05-17

## 2017-03-03 RX ORDER — DICLOFENAC SODIUM 10 MG/G
GEL TOPICAL
Qty: 100 G | Refills: 0 | Status: SHIPPED | OUTPATIENT
Start: 2017-03-03 | End: 2017-05-23

## 2017-03-03 NOTE — MR AVS SNAPSHOT
"    [facility]  [address]  [place] [address]  Phone: [phone]  Fax: [phone]                  Steff Johnson   3/3/2017 7:00 AM   Office Visit    Description:  Female : 1960   Provider:  Rita Queen PA-C   Department:  [facility]           Diagnoses this Visit        Comments    Acute pain of right knee    -  Primary     Type II or unspecified type diabetes mellitus without mention of complication, not stated as uncontrolled         Encounter for screening colonoscopy                To Do List           Future Appointments        Provider Department Dept Phone    3/3/2017 8:00 AM [facility] [id number] [phone]    3/22/2017 8:00 AM MARY ENDOCRINE EDUCATOR [facility] Diabetes Management [phone]    3/23/2017 3:00 PM [facility] [phone]    3/31/2017 7:00 AM Rita Queen PA-C [facility] [phone]    2017 9:40 AM Cristina Ren MD [facility] [phone]      Goals (5 Years of Data)     None      Follow-Up and Disposition     Return in about 4 weeks (around 3/31/2017).       These Medications        Disp Refills Start End    insulin glargine (LANTUS SOLOSTAR) 100 unit/mL (3 mL) InPn pen 15 Box 1 3/3/2017     ADMINISTER 25 UNITS UNDER THE SKIN DAILY TWICE DAILY    Pharmacy: [facility] [address] [place] [address] & [facility] Ph #: [phone]       diclofenac sodium 1 % Gel 100 g 0 3/3/2017     Apply 2 grams topically up to 4 times daily to affected joint.    Pharmacy: [facility] [address] & [facility] Ph #: [phone]       pen needle, diabetic (BD INSULIN PEN NEEDLE UF SHORT) 31 gauge x 5/16" Ndle 100 each 5 3/3/2017     USE EVERY DAY WITH LANTUS    Pharmacy: [facility] [address] AT [facility] & [facility]" " #: [phone]         [facility]     [facility] Care [facility] - 24/7 Assistance  Registered nurses in the [facility] provide clinical advisement, health education, appointment booking, and other advisory services.  Call for this free service at [phone].             Medications           Message regarding Medications     Verify the changes and/or additions to your medication regime listed below are the same as discussed with your clinician today.  If any of these changes or additions are incorrect, please notify your healthcare provider.        START taking these NEW medications        Refills    diclofenac sodium 1 % Gel 0    Sig: Apply 2 grams topically up to 4 times daily to affected joint.    Class: Normal    pen needle, diabetic (BD INSULIN PEN NEEDLE UF SHORT) 31 gauge x 5/16" Ndle 5    Sig: USE EVERY DAY WITH LANTUS    Class: Normal      CHANGE how you are taking these medications     Start Taking Instead of    insulin glargine (LANTUS SOLOSTAR) 100 unit/mL (3 mL) InPn pen insulin glargine (LANTUS SOLOSTAR) 100 unit/mL (3 mL) InPn pen    Dosage:  ADMINISTER 25 UNITS UNDER THE SKIN DAILY TWICE DAILY Dosage:  ADMINISTER 40 UNITS UNDER THE SKIN DAILY    Reason for Change:  Reorder            Verify that the below list of medications is an accurate representation of the medications you are currently taking.  If none reported, the list may be blank. If incorrect, please contact your healthcare provider. Carry this list with you in case of emergency.           Current Medications     amlodipine-valsartan (EXFORGE)  mg per tablet Take 1 tablet by mouth once daily.    gabapentin (NEURONTIN) 300 MG capsule TK 1 C PO HS    hydrochlorothiazide (HYDRODIURIL) 25 MG tablet Take 1 tablet (25 mg total) by mouth once daily.    hydrocodone-acetaminophen 10-325mg (NORCO)  mg Tab     insulin glargine (LANTUS SOLOSTAR) 100 unit/mL (3 mL) InPn pen ADMINISTER 25 UNITS UNDER THE SKIN " "DAILY TWICE DAILY    lidocaine-prilocaine (EMLA) cream     metformin (GLUCOPHAGE-XR) 500 MG 24 hr tablet 2 TABLETS TWICE DAILY WITH MEALS    metoprolol succinate (TOPROL-XL) 100 MG 24 hr tablet Take 1 tablet (100 mg total) by mouth once daily.    pen needle, diabetic (BD INSULIN PEN NEEDLE UF SHORT) 31 gauge x 5/16" Ndle USE EVERY DAY WITH LANTUS    tiotropium (SPIRIVA WITH HANDIHALER) 18 mcg inhalation capsule Inhale 1 capsule (18 mcg total) into the lungs once daily.    zolpidem (AMBIEN) 10 mg tablet Take 10 mg by mouth nightly as needed.      diclofenac sodium 1 % Gel Apply 2 grams topically up to 4 times daily to affected joint.           Clinical Reference Information           Your Vitals Were     BP Pulse Temp Resp Height Weight    132/98 (BP Location: Right arm, Patient Position: Sitting, BP Method: Manual) 62 98 °F (36.7 °C) (Oral) 18 4' 11" (1.499 m) 62.2 kg (137 lb 2 oz)    SpO2 BMI             97% 27.7 kg/m2         Blood Pressure          Most Recent Value    BP  (!)  132/98      Allergies as of 3/3/2017     No Known Allergies      Immunizations Administered on Date of Encounter - 3/3/2017     None      Orders Placed During Today's Visit      Normal Orders This Visit    Ambulatory Referral to Diabetes Education     Ambulatory referral to Gastroenterology     Future Labs/Procedures Expected by Expires    X-Ray Knee 3 View Right  3/3/2017 3/3/2018      Language Assistance Services     ATTENTION: Language assistance services are available, free of charge. Please call [phone].      ATENCIÓN: Si francie alba, tiene a otero disposición servicios gratuitos de asistencia lingüística. Llame al [phone].     CHÚ Ý: N?u b?n nói Ti?ng Vi?t, có các d?ch v? h? tr? ngôn ng? mi?n phí dành cho b?n. G?i s? [phone].         [facility] complies with applicable Federal civil rights laws and does not discriminate on the basis of race, color, national origin, age, disability, or sex.        "

## 2017-03-03 NOTE — TELEPHONE ENCOUNTER
X-ray of the knee showed a bone spur, but otherwise no acute findings. I have placed a referral to Ortho for further evaluation if her symptoms persist.

## 2017-03-03 NOTE — PROGRESS NOTES
Subjective:       Patient ID: Steff Johnson is a 57 y.o. female.    Chief Complaint: Diabetes    HPI Comments: Mrs. Johnson is a 57 year old female who presents to clinic for follow up on uncontrolled type 2 diabetes. Since last visit, she underwent labs which showed increase in HgbA1c to 11.0 from 8.7 from 10 months ago. She states her morning blood sugar typically runs between 126-132, but she is not checking her afternoon or night-time blood sugars. She is currently taking lantus 40 units nightly and metformin  mg BID (although RX is written for 1000 mg BID). She states her blood pressure has significantly improved; although it remains elevated today in clinic. Toprol XL was recently increased to 100 mg XL last week at her blood pressure check. She also complains of new right knee pain for the last 3 weeks. Pain is currently 8/10 and described as aching sensation that is worse with activity. She denies weakness in the leg or giving out. She denies trauma or injury. She states pain can be so severe it causes her to limp on occasion. She has not tried any OTC medication for her pain.     Review of Systems   Constitutional: Negative for activity change, appetite change, chills, fatigue and fever.   Eyes: Negative for visual disturbance.   Respiratory: Negative for cough and shortness of breath.    Cardiovascular: Negative for chest pain, palpitations and leg swelling.   Gastrointestinal: Negative for abdominal pain, constipation, diarrhea, nausea and vomiting.   Musculoskeletal: Positive for arthralgias (knee pain) and gait problem. Negative for back pain, joint swelling and myalgias.   Neurological: Negative for dizziness, weakness, light-headedness and headaches.       Objective:      Vitals:    03/03/17 0725   BP: (!) 138/92   Pulse:    Resp:    Temp:      Physical Exam   Constitutional: She is oriented to person, place, and time. She appears well-developed and well-nourished.   HENT:   Head: Normocephalic  "and atraumatic.   Right Ear: Hearing normal.   Left Ear: Hearing normal.   Eyes: Conjunctivae and EOM are normal. Pupils are equal, round, and reactive to light.   Neck: Carotid bruit is not present.   Cardiovascular: Normal rate, regular rhythm, normal heart sounds and intact distal pulses.    Pulmonary/Chest: Effort normal and breath sounds normal.   Musculoskeletal:        Right knee: She exhibits normal range of motion, no swelling and no effusion. Tenderness found. Medial joint line tenderness noted.   Neurological: She is alert and oriented to person, place, and time.   Skin: Skin is warm and dry.   Psychiatric: She has a normal mood and affect. Her behavior is normal.   Vitals reviewed.      Assessment:       1. Type II or unspecified type diabetes mellitus without mention of complication, not stated as uncontrolled    2. Acute pain of right knee    3. Essential hypertension    4. Encounter for screening colonoscopy        Plan:       Type II or unspecified type diabetes mellitus without mention of complication, not stated as uncontrolled     Change insulin dosing to lantus 25 units twice daily. Increase dose by 1 unit in the AM/PM every day the morning fasting blood sugar is >150 until up to 30 units BID.  -     insulin glargine (LANTUS SOLOSTAR) 100 unit/mL (3 mL) InPn pen; ADMINISTER 25 UNITS UNDER THE SKIN DAILY TWICE DAILY  Dispense: 15 Box; Refill: 1  -     pen needle, diabetic (BD INSULIN PEN NEEDLE UF SHORT) 31 gauge x 5/16" Ndle; USE EVERY DAY WITH LANTUS  Dispense: 100 each; Refill: 5  - Increase metformin  m tabs PO BID.        -     Ambulatory Referral to Diabetes Education        - Blood glucose log provided with instructions to check BG 4 times daily and bring log into next visit.     Acute pain of right knee  -      X-Ray Knee 3 View Right; Future; Expected date: 3/3/17  -     diclofenac sodium 1 % Gel; Apply 2 grams topically up to 4 times daily to affected joint.  Dispense: 100 g; " Refill: 0    Essential hypertension         - Improving control. Continue current medication since dosage was recently adjusted.          - Monitor BP at home and keep a log. Notify clinic if BP is persistently >140/90.           - I counseled the patient on HTN education, management and recommendations.  I recommended weight loss toward a BMI < 25, avoidance of salt and the DASH diet, regular cardio exercise a minimum of 150 minutes per week and medications if indicated.  Printed materials were given.    Encounter for screening colonoscopy  -     Ambulatory referral to Gastroenterology    Follow up in 4 weeks    Patient readiness: acceptance and barriers:none    During the course of the visit the patient was educated and counseled about the following:     Diabetes:  See medication changes above.   Hypertension:   Medication: no change.  Dietary sodium restriction.  Check blood pressures daily and record.    Goals: Diabetes: Maintain Hemoglobin A1C below 7 and Hypertension: Reduce Blood Pressure    Did patient meet goals/outcomes: Yes    The following self management tools provided: blood pressure log  blood glucose log    Patient Instructions (the written plan) was given to the patient/family.     Time spent with patient: 30 minutes

## 2017-03-10 ENCOUNTER — TELEPHONE (OUTPATIENT)
Dept: FAMILY MEDICINE | Facility: CLINIC | Age: 57
End: 2017-03-10

## 2017-03-10 NOTE — TELEPHONE ENCOUNTER
----- Message from Cristina Ren MD sent at 3/10/2017  3:18 PM CST -----  Notify patient:  Your diabetic control is extremely poor.  Check BS 4 x day and record in log.  Keep appt with KLARISSA Queen to review and adjust meds. I will also refer you to the diabetes educator and the  to help us get it under control.  Otherwise your liver and kidney functions are normal.  Your blood cell counts are close to normal and your iron is normal.

## 2017-03-20 ENCOUNTER — OFFICE VISIT (OUTPATIENT)
Dept: ORTHOPEDICS | Facility: CLINIC | Age: 57
End: 2017-03-20
Payer: MEDICARE

## 2017-03-20 VITALS
HEIGHT: 59 IN | BODY MASS INDEX: 27.62 KG/M2 | HEART RATE: 67 BPM | WEIGHT: 137 LBS | SYSTOLIC BLOOD PRESSURE: 165 MMHG | DIASTOLIC BLOOD PRESSURE: 84 MMHG

## 2017-03-20 DIAGNOSIS — M25.562 PAIN IN BOTH KNEES, UNSPECIFIED CHRONICITY: Primary | ICD-10-CM

## 2017-03-20 DIAGNOSIS — M17.10 ARTHRITIS OF KNEE: Primary | ICD-10-CM

## 2017-03-20 DIAGNOSIS — M25.561 PAIN IN BOTH KNEES, UNSPECIFIED CHRONICITY: Primary | ICD-10-CM

## 2017-03-20 PROCEDURE — 99203 OFFICE O/P NEW LOW 30 MIN: CPT | Mod: S$GLB,,, | Performed by: ORTHOPAEDIC SURGERY

## 2017-03-20 PROCEDURE — 3077F SYST BP >= 140 MM HG: CPT | Mod: S$GLB,,, | Performed by: ORTHOPAEDIC SURGERY

## 2017-03-20 PROCEDURE — 1160F RVW MEDS BY RX/DR IN RCRD: CPT | Mod: S$GLB,,, | Performed by: ORTHOPAEDIC SURGERY

## 2017-03-20 PROCEDURE — 3079F DIAST BP 80-89 MM HG: CPT | Mod: S$GLB,,, | Performed by: ORTHOPAEDIC SURGERY

## 2017-03-20 PROCEDURE — 99999 PR PBB SHADOW E&M-EST. PATIENT-LVL III: CPT | Mod: PBBFAC,,, | Performed by: ORTHOPAEDIC SURGERY

## 2017-03-20 NOTE — LETTER
March 22, 2017      Cristina Ren MD  [address]  [facility] [address]           [address] [address]  Phone: [phone]          Patient: Steff Johnson   MR Number: [id number]   YOB: 1960   Date of Visit: 3/20/2017       Dear Dr. Cristina Ren:    Thank you for referring Steff Johnson to me for evaluation. Attached you will find relevant portions of my assessment and plan of care.    If you have questions, please do not hesitate to call me. I look forward to following Steff Johnson along with you.    Sincerely,    Messi Molina MD    Enclosure  CC:  No Recipients    If you would like to receive this communication electronically, please contact [email or web address] or [phone] to request more information on [facility] Link access.    For providers and/or their staff who would like to refer a patient to [facility], please contact us through our one-stop-shop provider referral line, [facility], at [phone].    If you feel you have received this communication in error or would no longer like to receive these types of communications, please e-mail [email or web address]

## 2017-03-22 ENCOUNTER — CLINICAL SUPPORT (OUTPATIENT)
Dept: DIABETES | Facility: CLINIC | Age: 57
End: 2017-03-22
Payer: MEDICARE

## 2017-03-22 ENCOUNTER — OUTPATIENT CASE MANAGEMENT (OUTPATIENT)
Dept: ADMINISTRATIVE | Facility: OTHER | Age: 57
End: 2017-03-22

## 2017-03-22 VITALS — BODY MASS INDEX: 27.62 KG/M2 | WEIGHT: 137 LBS | HEIGHT: 59 IN

## 2017-03-22 DIAGNOSIS — E11.9 TYPE II OR UNSPECIFIED TYPE DIABETES MELLITUS WITHOUT MENTION OF COMPLICATION, NOT STATED AS UNCONTROLLED: ICD-10-CM

## 2017-03-22 PROCEDURE — G0108 DIAB MANAGE TRN  PER INDIV: HCPCS | Mod: S$GLB,,, | Performed by: DIETITIAN, REGISTERED

## 2017-03-22 PROCEDURE — 99999 PR PBB SHADOW E&M-EST. PATIENT-LVL II: CPT | Mod: PBBFAC,,,

## 2017-03-22 NOTE — PROGRESS NOTES
03/22/17 0000   Diabetes Education Visit   Diabetes Education Record Assessment/Progress Initial   Diabetes Type   Diabetes Type  Type II   Diabetes History   Diabetes Diagnosis 3-5 years   Nutrition   Meal Planning drinks regular soda;skipping meals   Meal Plan 24 Hour Recall - Breakfast (skipped today- typically eats grits, eggs, sausage and milk or water)   Meal Plan 24 Hour Recall - Lunch (pt reports usually eats salad or fried chicken with coke)   Meal Plan 24 Hour Recall - Dinner (greens and cornbread with water and coke or sprite)   Meal Plan 24 Hour Recall - Snack (pt reports she does not snack)   Monitoring    Monitoring Other   Self Monitoring  (2x/day will attach blood sugar logs)               Blood Glucose Logs Yes   Exercise    Exercise Type (no routine exercise)   Current Diabetes Treatment    Current Treatment Oral Medication;Insulin  (1000 metformin BID 25 units Lantus BID)   Social History   Preferred Learning Method Face to Face   Primary Support Self;Family   Smoking Status Never a Smoker   Alcohol Use Never   Barriers to Change   Barriers to Change Cognitive   Learning Challenges  Literacy   Readiness to Learn    Readiness to Learn  Acceptance   Cultural Influences   Cultural Influences No   Diabetes Education Assessment/Progress   Acute Complications (preventing, detecting, and treating acute complications) DC  (discussed treatment for hypoglycemia)   Chronic Complications (preventing, detecting, and treating chronic complications) DC;W Educated patient on the importance of improving A1C to prevent complications.   Diabetes Disease Process (diabetes disease process and treatment options) DC   Nutrition (Incorporating nutritional management into one's lifestyle) DC;W;5 Educated patient on plate method with carb limit set to 30-45 grams per meal and 15 grams or less per snack.  Educated patient on how to read nutrition labels for carb, protein, fiber and fat content.  Provided Carb counting  and meal planning book for reference. Instructed patient on the importance of eating three times per day.   Medications (states correct name, dose, onset, peak, duration, side effects & timing of meds) DC;W;5 Educated patient on the importance of taking medications with meals as prescribed   Monitoring (monitoring blood glucose/other parameters & using results) DC;W;5 Provided a log sheet for pt to test twice a day and bring to her follow up visit with Rita Queen   Goal Setting and Problem Solving (verbalizes behavior change strategies & sets realistic goals) DC   Behavior Change (developing personal strategies to health & behavior change) DC   Psychosocial Issues (developing personal srategies to address psychosocial concerns) DC   Goals   Healthy Eating Set  (stop drinking beverages with sugar)   Start Date 03/22/17   Target Date 06/22/17   Physical Activity In Progress   Monitoring In Progress   Medications In Progress   Problem Solving In Progress   Healthy Coping In Progress   Reducing Risks In Progress   Other In Progress   Diabetes Self-Management Support Plan   Diabetes Learning other   Other learning (A1c goals and target)   Exercise/Nutrition other   Other exercise/nutrition (carb counting and meal planning book, list of beverages with no sugar)   Stress Management family;friends   Review Status Patient has selected and agrees to support plan.   Diabetes Care Plan/Intervention   Education Plan/Intervention In F/U DSMT   Diabetes Meal Plan   Restrictions Restricted Carbohydrate   Calories 1500   Carbohydrate Per Meal 30-45g   Carbohydrate Per Snack  7-15g   Fat (reduce intake of saturated fats)   Protein (consume lean proteins with meals and snacks)   Education Units of Time    Time Spent 60 min     Prerna Mccarty, Dietetic Intern

## 2017-03-22 NOTE — PROGRESS NOTES
Please note the following patients information has been forwarded to [facility] for case mgmt or  by Outpatient complex care mgmt.    Please see the media section of patients chart for additional details.    Please contact ext [phone] with any questions.    Thank you,      Marce Platt, SSC

## 2017-03-22 NOTE — PROGRESS NOTES
"Past Medical History:   Diagnosis Date    COPD (chronic obstructive pulmonary disease)     Diabetes mellitus     Hypertension        Past Surgical History:   Procedure Laterality Date    HYSTERECTOMY      SHOULDER SURGERY      R       Current Outpatient Prescriptions   Medication Sig    amlodipine-valsartan (EXFORGE)  mg per tablet Take 1 tablet by mouth once daily.    diclofenac sodium 1 % Gel Apply 2 grams topically up to 4 times daily to affected joint.    gabapentin (NEURONTIN) 300 MG capsule TK 1 C PO HS    hydrochlorothiazide (HYDRODIURIL) 25 MG tablet Take 1 tablet (25 mg total) by mouth once daily.    hydrocodone-acetaminophen 10-325mg (NORCO)  mg Tab     insulin glargine (LANTUS SOLOSTAR) 100 unit/mL (3 mL) InPn pen ADMINISTER 25 UNITS UNDER THE SKIN DAILY TWICE DAILY    lidocaine-prilocaine (EMLA) cream     metformin (GLUCOPHAGE-XR) 500 MG 24 hr tablet 2 TABLETS TWICE DAILY WITH MEALS    metoprolol succinate (TOPROL-XL) 100 MG 24 hr tablet Take 1 tablet (100 mg total) by mouth once daily.    pen needle, diabetic (BD INSULIN PEN NEEDLE UF SHORT) 31 gauge x 5/16" Ndle USE EVERY DAY WITH LANTUS    tiotropium (SPIRIVA WITH HANDIHALER) 18 mcg inhalation capsule Inhale 1 capsule (18 mcg total) into the lungs once daily.    zolpidem (AMBIEN) 10 mg tablet Take 10 mg by mouth nightly as needed.       No current facility-administered medications for this visit.        Review of patient's allergies indicates:  No Known Allergies    Family History   Problem Relation Age of Onset    Diabetes Mother     Asthma Mother     Hypertension Mother     Diabetes Father     Diabetes Brother     Hypertension Brother     Anemia Daughter        Social History     Social History    Marital status: Single     Spouse name: N/A    Number of children: N/A    Years of education: N/A     Occupational History          disabled due to shoulder problems     Social History Main Topics    Smoking status: " Never Smoker    Smokeless tobacco: Never Used    Alcohol use No    Drug use: No    Sexual activity: Not Currently     Other Topics Concern    Not on file     Social History Narrative       Chief Complaint:   Chief Complaint   Patient presents with    Knee Pain     bilateral knee pain       History of present illness: This a 57-year-old female seen in consultation for Dr. Ren for bilateral knee pain.  This is been present for about a month.  Pain over the medial knee.  Patient just recently started some Voltaren gel.  Pain is up to an 8 out of 10 but sometimes gets a 10 out of 10.  She denies an injury or trauma.  Symptoms are stable and moderate to severe.  Pain with walking.      Review of Systems:    Constitution: Negative for chills, fever, and sweats.  Negative for unexplained weight loss.    HENT:  Negative for headaches and blurry vision.    Cardiovascular:Negative for chest pain or irregular heart beat. Negative for hypertension.    Respiratory:  Negative for cough and shortness of breath.    Gastrointestinal: Negative for abdominal pain, heartburn, melena, nausea, and vomitting.    Genitourinary:  Negative bladder incontinence and dysuria.    Musculoskeletal:  See HPI    Neurological: Negative for numbness.    Psychiatric/Behavioral: Negative for depression.  The patient is not nervous/anxious.      Endocrine: Negative for polyuria    Hematologic/Lymphatic: Negative for bleeding problem.  Does not bruise/bleed easily.    Skin: Negative for poor would healing and rash      Physical Examination:    Vital Signs:    Vitals:    03/20/17 1426   BP: (!) 165/84   Pulse: 67       Body mass index is 27.67 kg/(m^2).    This a well-developed, well nourished patient in no acute distress.  They are alert and oriented and cooperative to examination.  Pt. walks without an antalgic gait.      Examination of bilateral knees shows no rashes or erythema. There are no masses ecchymosis or effusion. Patient has full  range of motion from 0-130°. Patient is nontender to palpation over lateral joint line and mildly tender to palpation over the medial joint line. Patient has a - Lachman exam, - anterior drawer exam, and - posterior drawer exam. - Brian's exam. Knee is stable to varus and valgus stress. 5 out of 5 motor strength. Palpable distal pulses. Intact light touch sensation. Negative Patellofemoral crepitus      X-rays: X-rays of both knees are reviewed which show some very mild lateral narrowing bilaterally     Assessment:: Mild bilateral knee arthritis    Plan:  I reviewed the x-rays and the findings with her today.  I will like to see how she does with diclofenac gel.  She just recently started this a day or 2 ago.  I did recommend getting her into physical therapy for a knee program.  Also encouraged her to lose some weight.  Follow-up as needed.    This note was created using Dragon voice recognition software that occasionally misinterpreted phrases or words.    Consult note is delivered via Epic messaging service.

## 2017-03-23 ENCOUNTER — INITIAL CONSULT (OUTPATIENT)
Dept: GASTROENTEROLOGY | Facility: CLINIC | Age: 57
End: 2017-03-23
Payer: MEDICARE

## 2017-03-23 VITALS
HEART RATE: 69 BPM | HEIGHT: 59 IN | BODY MASS INDEX: 27.56 KG/M2 | DIASTOLIC BLOOD PRESSURE: 82 MMHG | SYSTOLIC BLOOD PRESSURE: 183 MMHG | WEIGHT: 136.69 LBS

## 2017-03-23 DIAGNOSIS — J44.9 CHRONIC OBSTRUCTIVE PULMONARY DISEASE, UNSPECIFIED COPD TYPE: Primary | ICD-10-CM

## 2017-03-23 DIAGNOSIS — Z12.12 SCREENING FOR COLORECTAL CANCER: ICD-10-CM

## 2017-03-23 DIAGNOSIS — E11.9 TYPE II OR UNSPECIFIED TYPE DIABETES MELLITUS WITHOUT MENTION OF COMPLICATION, NOT STATED AS UNCONTROLLED: ICD-10-CM

## 2017-03-23 DIAGNOSIS — Z12.11 SCREENING FOR COLORECTAL CANCER: ICD-10-CM

## 2017-03-23 DIAGNOSIS — I10 ESSENTIAL HYPERTENSION: ICD-10-CM

## 2017-03-23 PROCEDURE — 3046F HEMOGLOBIN A1C LEVEL >9.0%: CPT | Mod: S$GLB,,, | Performed by: INTERNAL MEDICINE

## 2017-03-23 PROCEDURE — 99203 OFFICE O/P NEW LOW 30 MIN: CPT | Mod: S$GLB,,, | Performed by: INTERNAL MEDICINE

## 2017-03-23 PROCEDURE — 2022F DILAT RTA XM EVC RTNOPTHY: CPT | Mod: S$GLB,,, | Performed by: INTERNAL MEDICINE

## 2017-03-23 PROCEDURE — 99999 PR PBB SHADOW E&M-EST. PATIENT-LVL II: CPT | Mod: PBBFAC,,, | Performed by: INTERNAL MEDICINE

## 2017-03-23 PROCEDURE — 1160F RVW MEDS BY RX/DR IN RCRD: CPT | Mod: S$GLB,,, | Performed by: INTERNAL MEDICINE

## 2017-03-23 PROCEDURE — 4010F ACE/ARB THERAPY RXD/TAKEN: CPT | Mod: S$GLB,,, | Performed by: INTERNAL MEDICINE

## 2017-03-23 PROCEDURE — 3079F DIAST BP 80-89 MM HG: CPT | Mod: S$GLB,,, | Performed by: INTERNAL MEDICINE

## 2017-03-23 PROCEDURE — 99499 UNLISTED E&M SERVICE: CPT | Mod: S$PBB,,, | Performed by: INTERNAL MEDICINE

## 2017-03-23 PROCEDURE — 3077F SYST BP >= 140 MM HG: CPT | Mod: S$GLB,,, | Performed by: INTERNAL MEDICINE

## 2017-03-23 NOTE — LETTER
March 23, 2017      Cristina Ren MD  [address]  [facility] [address]           [facility]  [address]  [facility] [address]  Phone: [phone]          Patient: Steff Johnson   MR Number: [id number]   YOB: 1960   Date of Visit: 3/23/2017       Dear Dr. Cristina Ren:    Thank you for referring Steff Johnson to me for evaluation. Attached you will find relevant portions of my assessment and plan of care.    If you have questions, please do not hesitate to call me. I look forward to following Steff Johnson along with you.    Sincerely,    Jared Antonio MD    Enclosure  CC:  No Recipients    If you would like to receive this communication electronically, please contact [email or web address] or [phone] to request more information on [facility] Link access.    For providers and/or their staff who would like to refer a patient to [facility], please contact us through our one-stop-shop provider referral line, [facility], at [phone].    If you feel you have received this communication in error or would no longer like to receive these types of communications, please e-mail [email or web address]

## 2017-03-23 NOTE — PROGRESS NOTES
"Subjective:       Patient ID: Steff Johnson is a 57 y.o. female.    This patient is new to me.  Referring MD:  Dr. Ren for screening for colorectal cancer.      Chief Complaint: Screening for colorectal cancer    HPI Comments: Patient seen for colorectal cancer screening, average risk, age appropriate, first occurrence, with no associated signs/symptoms, and no alleviating/exacerbating factors.  No family history of colon cancer but her brother had large polyps.  No GI bleeding or change in bowel habits.  No other complaints.    Review of Systems   Constitutional: Negative for chills, fatigue and fever.   HENT: Negative for sore throat and trouble swallowing.    Respiratory: Negative for cough, shortness of breath and wheezing.    Cardiovascular: Negative for chest pain and palpitations.   Gastrointestinal: Negative for abdominal pain, blood in stool, nausea and vomiting.   Genitourinary: Negative for dysuria and hematuria.   Musculoskeletal: Negative for arthralgias and myalgias.   Skin: Negative for color change and rash.   Neurological: Negative for dizziness and headaches.   Hematological: Negative for adenopathy.   Psychiatric/Behavioral: Negative for confusion. The patient is not nervous/anxious.    All other systems reviewed and are negative.      Objective:         Vitals:    03/23/17 1453   BP: (!) 183/82   Pulse: 69   Weight: 62 kg (136 lb 11 oz)   Height: 4' 11" (1.499 m)       Physical Exam   Constitutional: She appears well-developed and well-nourished.   HENT:   Head: Normocephalic and atraumatic.   Eyes: Pupils are equal, round, and reactive to light. No scleral icterus.   Neck: Normal range of motion.   Cardiovascular: Normal rate and regular rhythm.    No murmur heard.  Pulmonary/Chest: Effort normal and breath sounds normal. She has no wheezes.   Abdominal: Soft. Bowel sounds are normal. She exhibits no distension. There is no tenderness.   Musculoskeletal: She exhibits no edema or " tenderness.   Lymphadenopathy:     She has no cervical adenopathy.   Neurological: She is alert.   Skin: Skin is warm and dry. No rash noted.   Vitals reviewed.        Lab Results   Component Value Date    WBC 8.17 02/09/2017    HGB 12.7 02/09/2017    HCT 35.6 (L) 02/09/2017    MCV 77 (L) 02/09/2017     (H) 02/09/2017       Old records from Dr. Ren office ([facility]) reviewed and are significant for request for CRC screening.  Chronic anemia with iron studies NOT consistent with iron deficiency also noted.          Assessment:       1. Chronic obstructive pulmonary disease, unspecified COPD type    2. Essential hypertension    3. Type II or unspecified type diabetes mellitus without mention of complication, not stated as uncontrolled    4. Screening for colorectal cancer        Plan:       1.  Schedule colonoscopy  2.  Further recommendations to follow after above.  3.  Communication will be sent to the referring MD, Dr. Ren regarding my assessment and plan on this patient via EPIC.

## 2017-03-24 ENCOUNTER — DOCUMENTATION ONLY (OUTPATIENT)
Dept: FAMILY MEDICINE | Facility: CLINIC | Age: 57
End: 2017-03-24

## 2017-03-24 NOTE — PROGRESS NOTES
Pre-Visit Chart Review  For Appointment Scheduled on 03/31/2017    Health Maintenance Due   Topic Date Due    TETANUS VACCINE  01/08/1976    Colonoscopy  01/08/2008    Eye Exam  06/01/2016    Lipid Panel  06/05/2016

## 2017-03-24 NOTE — PATIENT INSTRUCTIONS
Diabetes (General Information)  Diabetes is a long-term health problem. It means your body does not make enough insulin. Or it may mean that your body cannot use the insulin it makes. Insulin is a hormone in your body. It lets blood sugar (glucose) reach the cells in your body. All of your cells need glucose for fuel.  When you have diabetes, the glucose in your blood builds up because it cannot get into the cells. This buildup is called high blood sugar (hyperglycemia).  Your blood sugar level depends on several things. It depends on what kind of food you eat and how much of it you eat. It also depends on how much exercise you get, and how much insulin you have in your body. Eating too much of the wrong kinds of food or not taking diabetes medicine on time can cause high blood sugar. Infections can cause high blood sugar even if you are taking medicines correctly.  These things can also cause low blood sugar:  · Missing meals  · Not eating enough food  · Taking too much diabetes medicine  Diabetes can cause serious problems over time if you do not get treated. These problems include heart disease, stroke, kidney failure, and blindness. They also include nerve pain or loss of feeling in your legs and feet, and gangrene of the feet. By keeping your blood sugar under control you can prevent or delay these problems.  Normal blood sugar levels are 80 to 100 before a meal and less than 180 in the 1 to 2 hours after a meal.  Home care  Follow these guidelines when caring for yourself at home:  · Follow the diet your healthcare provider gives you. Take insulin or other diabetes medicine exactly as told to.  · Watch your blood sugar as you are told to. Keep a log of your results. This will help your provider change your medicines to keep your blood sugar under control.  · Try to reach your ideal weight. You may be able to cut back on or not have to take diabetes medicine if you eat the right foods and get exercise.  · Do  not smoke. Smoking worsens the effects of diabetes on your circulation. You are much more likely to have a heart attack if you have diabetes and you smoke.  · Take good care of your feet. If you have lost feeling in your feet, you may not see an injury or infection. Check your feet and between your toes at least once a week.  · Wear a medical alert bracelet or necklace, or carry a card in your wallet that says you have diabetes. This will help healthcare providers give you the right care if you get very ill and cannot tell them that you have diabetes.  Sick day plan  If you get a cold, the flu, or a bacterial or viral infection, take these steps:  · Look at your diabetes sick plan and call your healthcare provider as you were told to. You may need to call your provider right away if:  ¨ Your blood sugar is above 240 while taking your diabetes medicine  ¨ Your urine ketone levels are above normal or high  ¨ You have been vomiting more than 6 hours  ¨ You have trouble breathing or your breath ha s a fruity smell  ¨ You have a high fever  ¨ You have a fever for several days and you are not getting better  ¨ You get light-headed and are sleepier than usual  · Keep taking your diabetes pills (oral medicine) even if you have been vomiting and are feeling sick. Call your provider right away because you may need insulin to lower your blood sugar until you recover from your illness.  · Keep taking your insulin even if you have been vomiting and are feeling sick. Call your provider right away to ask if you need to change your insulin dose. This will depend on your blood sugar results.  · Check your blood sugar every 2 to 4 hours, or at least 4 times a day.  · Check your ketones often. If you are vomiting and having diarrhea, watch them more often.  · Do not skip meals. Try to eat small meals on a regular schedule. Do this even if you do not feel like eating.  · Drink water or other liquids that do not have caffeine or  calories. This will keep you from getting dehydrated. If you are nauseated or vomiting, takes small sips every 5 minutes. To prevent dehydration try to drink a cup (8 ounces) of fluids every hour while you are awake.  General care  Always bring a source of fast-acting sugar with you in case you have symptoms of low blood sugar (below 70). At the first sign of low blood sugar, eat or drink 15 to 20 grams of fast-acting sugar to raise your blood sugar. Examples are:  · 3 to 4 glucose tablets. You can buy these at most [employer].  · 4 ounces (1/2 cup) of regular (not diet) soft drinks  · 4 ounces (1/2 cup) of any fruit juice  · 8 ounces (1 cup) of milk  · 5 to 6 pieces of hard candy  · 1 tablespoon of honey  Check your blood sugar 15 minutes after treating yourself. If it is still below 70, take 15 to 20 more grams of fast-acting sugar. Test again in 15 minutes. If it returns to normal (70 or above), eat a snack or meal to keep your blood sugar in a safe range. If it stays low, call your doctor or go to an emergency room.  Follow-up care  Follow-up with your healthcare provider, or as advised. For more information about diabetes, visit the American Diabetes Association website at www.diabetes.org or call 821-971-4082.  When to seek medical advice  Call your healthcare provider right away if you have any of these symptoms of high blood sugar:  · Frequent urination  · Dizziness  · Drowsiness  · Thirst  · Headache  · Nausea or vomiting  · Abdominal pain  · Eyesight changes  · Fast breathing  · Confusion or loss of consciousness  Also call your provider right away if you have any of these signs of low blood sugar:  · Fatigue  · Headache  · Shakes  · Excess sweating  · Hunger  · Feeling anxious or restless  · Eyesight changes  · Drowsiness  · Weakness  · Confusion or loss of consciousness  Call 911  Call for emergency help right away if any of these occur:  · Chest pain or shortness of breath  · Dizziness or  fainting  · Weakness of an arm or leg or one side of the face  · Trouble speaking or seeing   Date Last Reviewed: 6/1/2016 © 2000-2016 [employer]. [address] [place] [address], [place] [address]. All rights reserved. This information is not intended as a substitute for professional medical care. Always follow your healthcare professional's instructions.        Controlling High Blood Pressure  High blood pressure (hypertension) is often called the silent killer. This is because many people who have it dont know it. High blood pressure is defined as 140/90 mm Hg or higher. Know your blood pressure and remember to check it regularly. Doing so can save your life. Here are some things you can do to help control your blood pressure.    Choose heart-healthy foods  · Select low-salt, low-fat foods. Limit sodium intake to 2,400 mg per day or the amount suggested by your healthcare provider.  · Limit canned, dried, cured, packaged, and fast foods. These can contain a lot of salt.  · Eat 8 to 10 servings of fruits and vegetables every day.  · Choose lean meats, fish, or chicken.  · Eat whole-grain pasta, brown rice, and beans.  · Eat 2 to 3 servings of low-fat or fat-free dairy products.  · Ask your doctor about the DASH eating plan. This plan helps reduce blood pressure.  · When you go to a restaurant, ask that your meal be prepared with no added salt.  Maintain a healthy weight  · Ask your healthcare provider how many calories to eat a day. Then stick to that number.  · Ask your healthcare provider what weight range is healthiest for you. If you are overweight, a weight loss of only 3% to 5% of your body weight can help lower blood pressure. Generally, a good weight loss goal is to lose 10% of your body weight in a year.  · Limit snacks and sweets.  · Get regular exercise.  Get up and get active  · Choose activities you enjoy. Find ones you can do with friends or family. This includes bicycling, dancing, walking,  and jogging.  · Park farther away from building entrances.  · Use stairs instead of the elevator.  · When you can, walk or bike instead of driving.  · [place] leaves, garden, or do household repairs.  · Be active at a moderate to vigorous level of physical activity for at least 40 minutes for a minimum of 3 to 4 days a week.   Manage stress  · Make time to relax and enjoy life. Find time to laugh.  · Communicate your concerns with your loved ones and your healthcare provider.  · Visit with family and friends, and keep up with hobbies.  Limit alcohol and quit smoking  · Men should have no more than 2 drinks per day.  · Women should have no more than 1 drink per day.  · Talk with your healthcare provider about quitting smoking. Smoking significantly increases your risk for heart disease and stroke. Ask your healthcare provider about community smoking cessation programs and other options.  Medicines  If lifestyle changes arent enough, your healthcare provider may prescribe high blood pressure medicine. Take all medicines as prescribed. If you have any questions about your medicines, ask your healthcare provider before stopping or changing them.   Date Last Reviewed: 4/27/2016  © 5817-2332 [employer]. [address] [place] [address], [place] [address]. All rights reserved. This information is not intended as a substitute for professional medical care. Always follow your healthcare professional's instructions.

## 2017-03-31 ENCOUNTER — OFFICE VISIT (OUTPATIENT)
Dept: FAMILY MEDICINE | Facility: CLINIC | Age: 57
End: 2017-03-31
Payer: MEDICARE

## 2017-03-31 VITALS
TEMPERATURE: 98 F | DIASTOLIC BLOOD PRESSURE: 80 MMHG | BODY MASS INDEX: 28 KG/M2 | HEART RATE: 64 BPM | SYSTOLIC BLOOD PRESSURE: 160 MMHG | WEIGHT: 138.88 LBS | HEIGHT: 59 IN

## 2017-03-31 DIAGNOSIS — Z79.4 TYPE 2 DIABETES MELLITUS WITH HYPERGLYCEMIA, WITH LONG-TERM CURRENT USE OF INSULIN: Primary | ICD-10-CM

## 2017-03-31 DIAGNOSIS — I10 UNCONTROLLED HYPERTENSION: ICD-10-CM

## 2017-03-31 DIAGNOSIS — E11.65 TYPE 2 DIABETES MELLITUS WITH HYPERGLYCEMIA, WITH LONG-TERM CURRENT USE OF INSULIN: Primary | ICD-10-CM

## 2017-03-31 PROCEDURE — 99499 UNLISTED E&M SERVICE: CPT | Mod: S$PBB,,, | Performed by: PHYSICIAN ASSISTANT

## 2017-03-31 PROCEDURE — 2022F DILAT RTA XM EVC RTNOPTHY: CPT | Mod: S$GLB,,, | Performed by: PHYSICIAN ASSISTANT

## 2017-03-31 PROCEDURE — 1160F RVW MEDS BY RX/DR IN RCRD: CPT | Mod: S$GLB,,, | Performed by: PHYSICIAN ASSISTANT

## 2017-03-31 PROCEDURE — 99999 PR PBB SHADOW E&M-EST. PATIENT-LVL IV: CPT | Mod: PBBFAC,,, | Performed by: PHYSICIAN ASSISTANT

## 2017-03-31 PROCEDURE — 99214 OFFICE O/P EST MOD 30 MIN: CPT | Mod: S$GLB,,, | Performed by: PHYSICIAN ASSISTANT

## 2017-03-31 PROCEDURE — 3046F HEMOGLOBIN A1C LEVEL >9.0%: CPT | Mod: S$GLB,,, | Performed by: PHYSICIAN ASSISTANT

## 2017-03-31 PROCEDURE — 4010F ACE/ARB THERAPY RXD/TAKEN: CPT | Mod: S$GLB,,, | Performed by: PHYSICIAN ASSISTANT

## 2017-03-31 PROCEDURE — 3077F SYST BP >= 140 MM HG: CPT | Mod: S$GLB,,, | Performed by: PHYSICIAN ASSISTANT

## 2017-03-31 PROCEDURE — 3079F DIAST BP 80-89 MM HG: CPT | Mod: S$GLB,,, | Performed by: PHYSICIAN ASSISTANT

## 2017-03-31 RX ORDER — CHLORTHALIDONE 25 MG/1
25 TABLET ORAL DAILY
Qty: 30 TABLET | Refills: 5 | Status: SHIPPED | OUTPATIENT
Start: 2017-03-31 | End: 2017-12-06

## 2017-03-31 NOTE — MR AVS SNAPSHOT
[facility]  [address]  [facility] [address]  Phone: [phone]  Fax: [phone]                  Steff Johnson   3/31/2017 7:00 AM   Office Visit    Description:  Female : 1960   Provider:  Rita Queen PA-C   Department:  [facility]           Reason for Visit     Follow-up           Diagnoses this Visit        Comments    Type 2 diabetes mellitus with hyperglycemia, with long-term current use of insulin    -  Primary     Uncontrolled hypertension                To Do List           Future Appointments        Provider Department Dept Phone    2017 4:00 PM Alyx Edmonds, PT [facility] [phone]    2017 11:20 AM Rita Queen PA-C [place] [phone]    5/15/2017 8:15 AM LAB, [facility] - Lab [phone]    2017 9:40 AM Cristina Ren MD [place] [phone]      Your Future Surgeries/Procedures     2017   Surgery with Jared Antonio MD   [facility] ([facility]) [address]  [facility] [address] [phone]              Goals (5 Years of Data)     None       These Medications        Disp Refills Start End    chlorthalidone (HYGROTEN) 25 MG Tab 30 tablet 5 3/31/2017 3/31/2018    Take 1 tablet (25 mg total) by mouth once daily. - Oral    Pharmacy: [facility] [address] [place] [address] Ph #: [phone]         [facility]     [facility] Care [facility] - 24/ Assistance  Unless otherwise directed by your provider, please contact [facility], our nurse care line that is available for / assistance.     Registered nurses in the [facility] provide: appointment scheduling, clinical advisement, health education, and other advisory services.  Call: [phone] (toll free)               Medications           Message  "regarding Medications     Verify the changes and/or additions to your medication regime listed below are the same as discussed with your clinician today.  If any of these changes or additions are incorrect, please notify your healthcare provider.        START taking these NEW medications        Refills    chlorthalidone (HYGROTEN) 25 MG Tab 5    Sig: Take 1 tablet (25 mg total) by mouth once daily.    Class: Normal    Route: Oral      STOP taking these medications     hydrochlorothiazide (HYDRODIURIL) 25 MG tablet Take 1 tablet (25 mg total) by mouth once daily.           Verify that the below list of medications is an accurate representation of the medications you are currently taking.  If none reported, the list may be blank. If incorrect, please contact your healthcare provider. Carry this list with you in case of emergency.           Current Medications     amlodipine-valsartan (EXFORGE)  mg per tablet Take 1 tablet by mouth once daily.    diclofenac sodium 1 % Gel Apply 2 grams topically up to 4 times daily to affected joint.    gabapentin (NEURONTIN) 300 MG capsule TK 1 C PO HS    hydrocodone-acetaminophen 10-325mg (NORCO)  mg Tab     insulin glargine (LANTUS SOLOSTAR) 100 unit/mL (3 mL) InPn pen ADMINISTER 25 UNITS UNDER THE SKIN DAILY TWICE DAILY    lidocaine-prilocaine (EMLA) cream     metformin (GLUCOPHAGE-XR) 500 MG 24 hr tablet 2 TABLETS TWICE DAILY WITH MEALS    metoprolol succinate (TOPROL-XL) 100 MG 24 hr tablet Take 1 tablet (100 mg total) by mouth once daily.    pen needle, diabetic (BD INSULIN PEN NEEDLE UF SHORT) 31 gauge x 5/16" Ndle USE EVERY DAY WITH LANTUS    tiotropium (SPIRIVA WITH HANDIHALER) 18 mcg inhalation capsule Inhale 1 capsule (18 mcg total) into the lungs once daily.    zolpidem (AMBIEN) 10 mg tablet Take 10 mg by mouth nightly as needed.      chlorthalidone (HYGROTEN) 25 MG Tab Take 1 tablet (25 mg total) by mouth once daily.           Clinical Reference Information    " "       Your Vitals Were     BP Pulse Temp Height Weight BMI    160/80 (BP Location: Right arm, Patient Position: Sitting, BP Method: Manual) 64 98.3 °F (36.8 °C) (Oral) 4' 11" (1.499 m) 63 kg (138 lb 14.2 oz) 28.05 kg/m2      Blood Pressure          Most Recent Value    BP  (!)  160/80      Allergies as of 3/31/2017     No Known Allergies      Immunizations Administered on Date of Encounter - 3/31/2017     None      Orders Placed During Today's Visit     Future Labs/Procedures Expected by Expires    Hemoglobin A1c  3/31/2017 5/30/2018    Lipid panel  3/31/2017 5/30/2018      Instructions      Diabetes (General Information)  Diabetes is a long-term health problem. It means your body does not make enough insulin. Or it may mean that your body cannot use the insulin it makes. Insulin is a hormone in your body. It lets blood sugar (glucose) reach the cells in your body. All of your cells need glucose for fuel.  When you have diabetes, the glucose in your blood builds up because it cannot get into the cells. This buildup is called high blood sugar (hyperglycemia).  Your blood sugar level depends on several things. It depends on what kind of food you eat and how much of it you eat. It also depends on how much exercise you get, and how much insulin you have in your body. Eating too much of the wrong kinds of food or not taking diabetes medicine on time can cause high blood sugar. Infections can cause high blood sugar even if you are taking medicines correctly.  These things can also cause low blood sugar:  · Missing meals  · Not eating enough food  · Taking too much diabetes medicine  Diabetes can cause serious problems over time if you do not get treated. These problems include heart disease, stroke, kidney failure, and blindness. They also include nerve pain or loss of feeling in your legs and feet, and gangrene of the feet. By keeping your blood sugar under control you can prevent or delay these problems.  Normal blood " sugar levels are 80 to 100 before a meal and less than 180 in the 1 to 2 hours after a meal.  Home care  Follow these guidelines when caring for yourself at home:  · Follow the diet your healthcare provider gives you. Take insulin or other diabetes medicine exactly as told to.  · Watch your blood sugar as you are told to. Keep a log of your results. This will help your provider change your medicines to keep your blood sugar under control.  · Try to reach your ideal weight. You may be able to cut back on or not have to take diabetes medicine if you eat the right foods and get exercise.  · Do not smoke. Smoking worsens the effects of diabetes on your circulation. You are much more likely to have a heart attack if you have diabetes and you smoke.  · Take good care of your feet. If you have lost feeling in your feet, you may not see an injury or infection. Check your feet and between your toes at least once a week.  · Wear a medical alert bracelet or necklace, or carry a card in your wallet that says you have diabetes. This will help healthcare providers give you the right care if you get very ill and cannot tell them that you have diabetes.  Sick day plan  If you get a cold, the flu, or a bacterial or viral infection, take these steps:  · Look at your diabetes sick plan and call your healthcare provider as you were told to. You may need to call your provider right away if:  ¨ Your blood sugar is above 240 while taking your diabetes medicine  ¨ Your urine ketone levels are above normal or high  ¨ You have been vomiting more than 6 hours  ¨ You have trouble breathing or your breath ha s a fruity smell  ¨ You have a high fever  ¨ You have a fever for several days and you are not getting better  ¨ You get light-headed and are sleepier than usual  · Keep taking your diabetes pills (oral medicine) even if you have been vomiting and are feeling sick. Call your provider right away because you may need insulin to lower your  blood sugar until you recover from your illness.  · Keep taking your insulin even if you have been vomiting and are feeling sick. Call your provider right away to ask if you need to change your insulin dose. This will depend on your blood sugar results.  · Check your blood sugar every 2 to 4 hours, or at least 4 times a day.  · Check your ketones often. If you are vomiting and having diarrhea, watch them more often.  · Do not skip meals. Try to eat small meals on a regular schedule. Do this even if you do not feel like eating.  · Drink water or other liquids that do not have caffeine or calories. This will keep you from getting dehydrated. If you are nauseated or vomiting, takes small sips every 5 minutes. To prevent dehydration try to drink a cup (8 ounces) of fluids every hour while you are awake.  General care  Always bring a source of fast-acting sugar with you in case you have symptoms of low blood sugar (below 70). At the first sign of low blood sugar, eat or drink 15 to 20 grams of fast-acting sugar to raise your blood sugar. Examples are:  · 3 to 4 glucose tablets. You can buy these at most [employer].  · 4 ounces (1/2 cup) of regular (not diet) soft drinks  · 4 ounces (1/2 cup) of any fruit juice  · 8 ounces (1 cup) of milk  · 5 to 6 pieces of hard candy  · 1 tablespoon of honey  Check your blood sugar 15 minutes after treating yourself. If it is still below 70, take 15 to 20 more grams of fast-acting sugar. Test again in 15 minutes. If it returns to normal (70 or above), eat a snack or meal to keep your blood sugar in a safe range. If it stays low, call your doctor or go to an emergency room.  Follow-up care  Follow-up with your healthcare provider, or as advised. For more information about diabetes, visit the American Diabetes Association website at www.diabetes.org or call 891-533-3294.  When to seek medical advice  Call your healthcare provider right away if you have any of these symptoms of high blood  sugar:  · Frequent urination  · Dizziness  · Drowsiness  · Thirst  · Headache  · Nausea or vomiting  · Abdominal pain  · Eyesight changes  · Fast breathing  · Confusion or loss of consciousness  Also call your provider right away if you have any of these signs of low blood sugar:  · Fatigue  · Headache  · Shakes  · Excess sweating  · Hunger  · Feeling anxious or restless  · Eyesight changes  · Drowsiness  · Weakness  · Confusion or loss of consciousness  Call 911  Call for emergency help right away if any of these occur:  · Chest pain or shortness of breath  · Dizziness or fainting  · Weakness of an arm or leg or one side of the face  · Trouble speaking or seeing   Date Last Reviewed: 6/1/2016  © 2515-3043 [employer]. [address] [place] [address], [place] [address]. All rights reserved. This information is not intended as a substitute for professional medical care. Always follow your healthcare professional's instructions.        Controlling High Blood Pressure  High blood pressure (hypertension) is often called the silent killer. This is because many people who have it dont know it. High blood pressure is defined as 140/90 mm Hg or higher. Know your blood pressure and remember to check it regularly. Doing so can save your life. Here are some things you can do to help control your blood pressure.    Choose heart-healthy foods  · Select low-salt, low-fat foods. Limit sodium intake to 2,400 mg per day or the amount suggested by your healthcare provider.  · Limit canned, dried, cured, packaged, and fast foods. These can contain a lot of salt.  · Eat 8 to 10 servings of fruits and vegetables every day.  · Choose lean meats, fish, or chicken.  · Eat whole-grain pasta, brown rice, and beans.  · Eat 2 to 3 servings of low-fat or fat-free dairy products.  · Ask your doctor about the DASH eating plan. This plan helps reduce blood pressure.  · When you go to a restaurant, ask that your meal be prepared with no added  salt.  Maintain a healthy weight  · Ask your healthcare provider how many calories to eat a day. Then stick to that number.  · Ask your healthcare provider what weight range is healthiest for you. If you are overweight, a weight loss of only 3% to 5% of your body weight can help lower blood pressure. Generally, a good weight loss goal is to lose 10% of your body weight in a year.  · Limit snacks and sweets.  · Get regular exercise.  Get up and get active  · Choose activities you enjoy. Find ones you can do with friends or family. This includes bicycling, dancing, walking, and jogging.  · Park farther away from building entrances.  · Use stairs instead of the elevator.  · When you can, walk or bike instead of driving.  · [place] leaves, garden, or do household repairs.  · Be active at a moderate to vigorous level of physical activity for at least 40 minutes for a minimum of 3 to 4 days a week.   Manage stress  · Make time to relax and enjoy life. Find time to laugh.  · Communicate your concerns with your loved ones and your healthcare provider.  · Visit with family and friends, and keep up with hobbies.  Limit alcohol and quit smoking  · Men should have no more than 2 drinks per day.  · Women should have no more than 1 drink per day.  · Talk with your healthcare provider about quitting smoking. Smoking significantly increases your risk for heart disease and stroke. Ask your healthcare provider about community smoking cessation programs and other options.  Medicines  If lifestyle changes arent enough, your healthcare provider may prescribe high blood pressure medicine. Take all medicines as prescribed. If you have any questions about your medicines, ask your healthcare provider before stopping or changing them.   Date Last Reviewed: 4/27/2016  © 5085-5858 [employer]. [address] [place] [address], [place] [address]. All rights reserved. This information is not intended as a substitute for professional medical  care. Always follow your healthcare professional's instructions.             Language Assistance Services     ATTENTION: Language assistance services are available, free of charge. Please call [phone].      ATENCIÓN: Si habmusa alba, tiene a otero disposición servicios gratuitos de asistencia lingüística. Llame al [phone].     CHÚ Ý: N?u b?n nói Ti?ng Vi?t, có các d?ch v? h? tr? ngôn ng? mi?n phí dành cho b?n. G?i s? [phone].         [facility] complies with applicable Federal civil rights laws and does not discriminate on the basis of race, color, national origin, age, disability, or sex.

## 2017-04-05 ENCOUNTER — CLINICAL SUPPORT (OUTPATIENT)
Dept: REHABILITATION | Facility: HOSPITAL | Age: 57
End: 2017-04-05
Attending: ORTHOPAEDIC SURGERY
Payer: MEDICARE

## 2017-04-05 DIAGNOSIS — M25.562 PAIN IN BOTH KNEES, UNSPECIFIED CHRONICITY: Primary | ICD-10-CM

## 2017-04-05 DIAGNOSIS — M25.561 PAIN IN BOTH KNEES, UNSPECIFIED CHRONICITY: Primary | ICD-10-CM

## 2017-04-05 PROCEDURE — G8978 MOBILITY CURRENT STATUS: HCPCS | Mod: CK,PN

## 2017-04-05 PROCEDURE — G8979 MOBILITY GOAL STATUS: HCPCS | Mod: CJ,PN

## 2017-04-05 PROCEDURE — 97010 HOT OR COLD PACKS THERAPY: CPT | Mod: PN

## 2017-04-05 PROCEDURE — 97161 PT EVAL LOW COMPLEX 20 MIN: CPT | Mod: PN

## 2017-04-05 PROCEDURE — 97110 THERAPEUTIC EXERCISES: CPT | Mod: PN

## 2017-04-05 NOTE — PLAN OF CARE
"  TIME RECORD    Date: 04/05/2017    Start Time:  1615  Stop Time:  1710    PROCEDURES:    TIMED  Procedure Time Min.   Therapeutic exercise Start:1645  Stop:1700    Cold packs Start:1700  Stop:1710     Start:  Stop:     Start:  Stop:          UNTIMED  Procedure Time Min.   Initial eval Start: 1615  Stop:1645     Start:  Stop:      Total Timed Minutes:  15  Total Timed Units:  1  Total Untimed Units:  1  Charges Billed/# of units:  2    OUTPATIENT PHYSICAL THERAPY   PATIENT EVALUATION  Onset Date: 02/04/17  Primary Diagnosis:   1. Pain in both knees, unspecified chronicity       Treatment Diagnosis: marley. Knee pain  Past Medical History:   Diagnosis Date    COPD (chronic obstructive pulmonary disease)     Diabetes mellitus     Hypertension      Precautions: fall risk  Prior Therapy: prior rt rotator cuff injury  Medications: Steff Johnson has a current medication list which includes the following prescription(s): amlodipine-valsartan, chlorthalidone, diclofenac sodium, gabapentin, hydrocodone-acetaminophen 10-325mg, insulin glargine, lidocaine-prilocaine, metformin, metoprolol succinate, pen needle, diabetic, tiotropium, and zolpidem.  Nutrition:  Normal  History of Present Illness: presents w/ marley. Knee pain of several month duration, aggravated by activity and alleviated by diclofenac sodium 1% gel/lying supine; pain has not progressed since onset;   Prior Level of Function: Independent  Social History: lives w/ brother; currently on disability for rt shoulder dysfunction  Place of Residence (Steps/Adaptations): raised trailer (4 steps w/ rail)  Functional Deficits Leading to Referral/Nature of Injury: limitations are a result of chronic, progressive marley. knee pain; subsequent mobility deficits during amb. and transfers  Patient Therapy Goals: be able to amb. further and return to PLOF    Subjective     Steff Johnson states "My pain keeps me from doing what I want to do."      Pain:  Location: medial aspect " of rt knee and general aspect of lt knee  Description: intermittent dull ache w/ occasional sharp pain  Activities Which Increase Pain: Standing, Walking, Getting out of bed/chair and stairs/curb  Activities Which Decrease Pain: pain medication, rest and topical gel  Pain Scale: 5/10 at best 8/10 now  10/10 at worst    Objective     Posture: guarded due to pain  Palpation: pain w/ palpation to medial aspect rt knee  Sensation: intermittent numbness big toe rt foot  DTRs: NA  Range of Motion/Strength:   Knee  Right   Left  Pain/Dysfunction with Movement    AROM PROM MMT AROM PROM MMT    Flexion 123  4-/5 130  4/5    Extension 0   0              Flexibility: WFL  Gait: amb. 75 ft w/ supervision   Analysis: decreased stance on rt LE = decreased stride length w/ lt LE  Bed Mobility: supervision  Transfers: supervision  Special Tests: 36/80 LEFS  Other: NA  Treatment: jose alejandro. X 10 reps marley. LE PRE's w/ yellow t-band, f/b x 12 min cold packs to marley. Knees (supine)    Assessment       Initial Assessment (Pertinent finding, problem list and factors affecting outcome): presents w/ significant c/o pain in marley. Knees (rt > lt) w/ subsequent strength/mobiltiy deficits  Rehab Potiential: good    Short Term Goals (3 Weeks):     1.  Jose Alejandro. 2 sets of PRE's to marley. LE's w/out significant increase in pain levels  2.  Decrease pain levels to less than 7/10  3.  Be able to ambulate w/ equal stance phases marley. LE's    Long Term Goals (6 Weeks):     1.  Improve LEFS score to 45/80  2.  Demo comp w/ HEP to maintain therapeutic gains  3.  Improve rt knee MMT 1/2 grade to demo strength gains from therapeutic intervention    Plan     Certification Period: 04/05/17 to 05/20/17  Recommended Treatment Plan: 2w7 for strength training, gait training, cold packs, instruct in HEP  Other Recommendations: NA      Therapist: Félix Isaacs, PT    I CERTIFY THE NEED FOR THESE SERVICES FURNISHED UNDER THIS PLAN OF TREATMENT AND WHILE UNDER MY CARE    Physician's  comments: ________________________________________________________________________________________________________________________________________________      Physician's Name: ___________________________________

## 2017-04-06 ENCOUNTER — CLINICAL SUPPORT (OUTPATIENT)
Dept: REHABILITATION | Facility: HOSPITAL | Age: 57
End: 2017-04-06
Attending: ORTHOPAEDIC SURGERY
Payer: MEDICARE

## 2017-04-06 DIAGNOSIS — M25.562 PAIN IN BOTH KNEES, UNSPECIFIED CHRONICITY: ICD-10-CM

## 2017-04-06 DIAGNOSIS — M25.561 PAIN IN BOTH KNEES, UNSPECIFIED CHRONICITY: ICD-10-CM

## 2017-04-06 PROCEDURE — 97010 HOT OR COLD PACKS THERAPY: CPT | Mod: PN

## 2017-04-06 PROCEDURE — 97110 THERAPEUTIC EXERCISES: CPT | Mod: PN

## 2017-04-06 NOTE — PROGRESS NOTES
Name: Steff Johnson  Clinic Number: [id number]  Date of Treatment: 04/06/2017   Diagnosis:   Encounter Diagnosis   Name Primary?    Pain in both knees, unspecified chronicity        Time in: 1315  Time Out: 1410  Total Treatment Time: 55      Subjective:    Steff reports no changes since eval yesterday.  Patient reports their pain to be 8/10 on a 0-10 scale with 0 being no pain and 10 being the worst pain imaginable in R LE, 3-4/10 in L LE.    Objective  Steff received therapeutic exercises to develop strength, endurance, ROM and flexibility for 45 minutes including:   Bike 10'  Ball squeeze 3/10   Heel slides 3/10 B  SLR 2/10   SAQ 2/10  S/L hip abd/add B 10x  Quad set 3/10 B    Written Home Exercises Provided: AP, SLR, HS, QS, standing HS curls, seated ball squeeze 3/10 daily  Pt demo good understanding of the education provided. Steff demonstrated good return demonstration of activities.     Assessment:   Patient not tolerating added weight to B LE's with against gravity therex.  Will attempt next visit. Crepitus noted with ext therex.    Pt will continue to benefit from skilled PT intervention. Medical Necessity is demonstrated by:  Pain limits function of effected part for some activities, Requires skilled supervision to complete and progress HEP and Weakness.    Patient is making good progress towards established goals.    Plan:  Continue with established Plan of Care towards PT goals.

## 2017-04-11 ENCOUNTER — HOSPITAL ENCOUNTER (OUTPATIENT)
Facility: HOSPITAL | Age: 57
Discharge: HOME | End: 2017-04-11
Attending: INTERNAL MEDICINE | Admitting: INTERNAL MEDICINE
Payer: MEDICARE

## 2017-04-11 ENCOUNTER — SURGERY (OUTPATIENT)
Age: 57
End: 2017-04-11

## 2017-04-11 ENCOUNTER — ANESTHESIA EVENT (OUTPATIENT)
Dept: ENDOSCOPY | Facility: HOSPITAL | Age: 57
End: 2017-04-11
Payer: MEDICARE

## 2017-04-11 ENCOUNTER — ANESTHESIA (OUTPATIENT)
Dept: ENDOSCOPY | Facility: HOSPITAL | Age: 57
End: 2017-04-11
Payer: MEDICARE

## 2017-04-11 VITALS
SYSTOLIC BLOOD PRESSURE: 164 MMHG | DIASTOLIC BLOOD PRESSURE: 79 MMHG | OXYGEN SATURATION: 100 % | HEART RATE: 60 BPM | BODY MASS INDEX: 27.42 KG/M2 | TEMPERATURE: 98 F | WEIGHT: 136 LBS | RESPIRATION RATE: 15 BRPM | HEIGHT: 59 IN

## 2017-04-11 VITALS — RESPIRATION RATE: 19 BRPM

## 2017-04-11 DIAGNOSIS — K63.5 POLYP OF COLON, UNSPECIFIED PART OF COLON, UNSPECIFIED TYPE: Primary | ICD-10-CM

## 2017-04-11 DIAGNOSIS — Z12.11 SCREEN FOR COLON CANCER: ICD-10-CM

## 2017-04-11 DIAGNOSIS — K64.8 INTERNAL HEMORRHOIDS: ICD-10-CM

## 2017-04-11 LAB — POCT GLUCOSE: 162 MG/DL (ref 70–110)

## 2017-04-11 PROCEDURE — D9220A PRA ANESTHESIA: Mod: PT,CRNA,, | Performed by: NURSE ANESTHETIST, CERTIFIED REGISTERED

## 2017-04-11 PROCEDURE — 25000003 PHARM REV CODE 250: Performed by: NURSE ANESTHETIST, CERTIFIED REGISTERED

## 2017-04-11 PROCEDURE — 37000009 HC ANESTHESIA EA ADD 15 MINS: Performed by: INTERNAL MEDICINE

## 2017-04-11 PROCEDURE — 88305 TISSUE EXAM BY PATHOLOGIST: CPT | Performed by: PATHOLOGY

## 2017-04-11 PROCEDURE — 82962 GLUCOSE BLOOD TEST: CPT

## 2017-04-11 PROCEDURE — D9220A PRA ANESTHESIA: Mod: PT,ANES,, | Performed by: ANESTHESIOLOGY

## 2017-04-11 PROCEDURE — 45380 COLONOSCOPY AND BIOPSY: CPT | Performed by: INTERNAL MEDICINE

## 2017-04-11 PROCEDURE — 27201012 HC FORCEPS, HOT/COLD, DISP: Performed by: INTERNAL MEDICINE

## 2017-04-11 PROCEDURE — 37000008 HC ANESTHESIA 1ST 15 MINUTES: Performed by: INTERNAL MEDICINE

## 2017-04-11 PROCEDURE — 25000003 PHARM REV CODE 250: Performed by: INTERNAL MEDICINE

## 2017-04-11 PROCEDURE — 45380 COLONOSCOPY AND BIOPSY: CPT | Mod: PT,,, | Performed by: INTERNAL MEDICINE

## 2017-04-11 PROCEDURE — 63600175 PHARM REV CODE 636 W HCPCS

## 2017-04-11 RX ORDER — PROPOFOL 10 MG/ML
INJECTION, EMULSION INTRAVENOUS
Status: COMPLETED
Start: 2017-04-11 | End: 2017-04-11

## 2017-04-11 RX ORDER — LIDOCAINE HCL/PF 100 MG/5ML
SYRINGE (ML) INTRAVENOUS
Status: DISCONTINUED | OUTPATIENT
Start: 2017-04-11 | End: 2017-04-11

## 2017-04-11 RX ORDER — SODIUM CHLORIDE 9 MG/ML
INJECTION, SOLUTION INTRAVENOUS CONTINUOUS
Status: DISCONTINUED | OUTPATIENT
Start: 2017-04-11 | End: 2017-04-11 | Stop reason: HOSPADM

## 2017-04-11 RX ADMIN — PROPOFOL 50 MG: 10 INJECTION, EMULSION INTRAVENOUS at 11:04

## 2017-04-11 RX ADMIN — SODIUM CHLORIDE: 0.9 INJECTION, SOLUTION INTRAVENOUS at 09:04

## 2017-04-11 RX ADMIN — PROPOFOL 50 MG: 10 INJECTION, EMULSION INTRAVENOUS at 10:04

## 2017-04-11 RX ADMIN — LIDOCAINE HYDROCHLORIDE 100 MG: 20 INJECTION, SOLUTION INTRAVENOUS at 10:04

## 2017-04-11 RX ADMIN — PROPOFOL 100 MG: 10 INJECTION, EMULSION INTRAVENOUS at 10:04

## 2017-04-11 NOTE — ANESTHESIA POSTPROCEDURE EVALUATION
"Anesthesia Post Evaluation    Patient: Steff Johnson    Procedure(s) Performed: Procedure(s) (LRB):  COLONOSCOPY (N/A)    Final Anesthesia Type: general  Patient location during evaluation: PACU  Patient participation: Yes- Able to Participate  Level of consciousness: awake and alert  Post-procedure vital signs: reviewed and stable  Pain management: adequate  Airway patency: patent  PONV status at discharge: No PONV  Anesthetic complications: no      Cardiovascular status: hemodynamically stable  Respiratory status: unassisted and room air  Hydration status: euvolemic  Follow-up not needed.        Visit Vitals    BP (!) 164/79    Pulse 60    Temp 36.7 °C (98 °F) (Oral)    Resp 15    Ht 4' 11" (1.499 m)    Wt 61.7 kg (136 lb)    SpO2 100%    Breastfeeding No    BMI 27.47 kg/m2       Pain/Estefania Score: Pain Assessment Performed: Yes (4/11/2017 11:30 AM)  Presence of Pain: denies (4/11/2017 12:00 PM)  Estefania Score: 10 (4/11/2017 12:00 PM)      "

## 2017-04-11 NOTE — DISCHARGE INSTRUCTIONS
Discharge Instructions: Eating a High-Fiber Diet  Your health care provider has prescribed a high-fiber diet for you. Fiber is what gives strength and structure to plants. Most grains, beans, vegetables, and fruits contain fiber. Foods rich in fiber are often low in calories and fat, but they fill you up more. These foods may also reduce the risk of certain health problems.  There are two types of fiber:  · Insoluble fiber. This is found in whole grains, cereals, and certain fruits and vegetables (such as apple skins, corn, and beans). Insoluble fiber is made up mainly of plant cell walls. It may prevent constipation and reduce the risk of certain types of cancer.  · Soluble fiber. This type of fiber is found in oats, beans, nuts, and certain fruits and vegetables (such as strawberries and peas). Soluble fiber turns to gel in the digestive system, slowing the movement of the digestive tract. It helps control blood sugar levels and can reduce cholesterol, which may help lower the risk of heart disease. Soluble fiber can also help control appetite.     Home care  · Know how much fiber you need a day. The recommended daily amount of fiber is 25 grams for women and 38 grams for men. After age 50, daily fiber needs drop to 21 grams for women and 30 grams for men.  · Ask your doctor about a fiber supplement. (Always take fiber supplements with a large glass of water.)  · Keep track of how much fiber you eat.  · Eat a variety of foods high in fiber.  · Learn to read and understand food labels.  · Ask your healthcare provider how much water you should be drinking.  · Look for these high-fiber foods:  ¨ Whole-grain breads and cereals  § 6 ounces a day give you about 18 grams of fiber (1 ounce is equal to 1 slice of bread, 1 cup of dry cereal, or 1/2 cup of cooked rice).  § Include wheat and oat bran cereals, whole-wheat muffins or toast, and corn tortillas in your meals.  ¨ Fruits   § 2 cups a day give you about 8 grams of  fiber.  § Apples, oranges, strawberries, pears, and bananas are good sources.  § Fruit juice does not contain as much fiber as the fruit it was made from.  ¨ Vegetables  § 2½ cups a day give you about 11 grams of fiber. Add asparagus, carrots, broccoli, peas, and corn to your meals.  ¨ Legumes  § 1/4 cup a day (in place of meat) gives you about 4 grams of fiber. Try navy beans, lentils, chickpeas, and soybeans.  ¨ Seeds   § A small handful of seeds gives you about 3 grams of fiber. Try sunflower seeds.  Follow-up  Make a follow-up appointment with a nutritionist as directed by our staff.  Date Last Reviewed: 6/1/2015 © 2000-2016 [employer]. [address] [place] [address]. All rights reserved. This information is not intended as a substitute for professional medical care. Always follow your healthcare professional's instructions.        Hemorrhoids    Hemorrhoids are swollen and inflamed veins inside the rectum and near the anus. The rectum is the last several inches of the colon. The anus is the passage between the rectum and the outside of the body.  Causes  The veins can become swollen due to increased pressure in them. This is most often caused by:  · Chronic constipation or diarrhea  · Straining when having a bowel movement  · Sitting too long on the toilet  · A low-fiber diet  · Pregnancy  Symptoms  · Bleeding from the rectum (this may be noticeable after bowel movements)  · Lump near the anus  · Itching around the anus  · Pain around the anus  There are different types of hemorrhoids. Depending on the type you have and the severity, you may be able to treat yourself at home. In some cases, a procedure may be the best treatment option. Your healthcare provider can tell you more about this, if needed.  Home care  General care  · To get relief from pain or itching, try:  ¨ Topical products. Your healthcare provider may prescribe or recommend creams, ointments, or pads that can be  applied to the hemorrhoid. Use these exactly as directed.  ¨ Medicines. Your healthcare provider may recommend stool softeners, suppositories, or laxatives to help manage constipation. Use these exactly as directed.  ¨ Sitz baths. A sitz bath involves sitting in a few inches of warm bath water. Be careful not to make the water so hot that you burn yourself--test it before sitting in it. Soak for about 10 to 15 minutes a few times a day. This may help relieve pain.  Tips to help prevent hemorrhoids  · Eat more fiber. Fiber adds bulk to stool and absorbs water as it moves through your colon. This makes stool softer and easier to pass.  ¨ Increase the fiber in your diet with more fiber-rich foods. These include fresh fruit, vegetables, and whole grains.  ¨ Take a fiber supplement or bulking agent, if advised to by your provider. These include products such as psyllium or methylcellulose.  · Drink plenty of water, if directed to by your provider. This can help keep stool soft.  · Be more active. Frequent exercise aids digestion and helps prevent constipation. It may also help make bowel movements more regular.  · Dont strain during bowel movements. This can make hemorrhoids more likely. Also, dont sit on the toilet for long periods of time.  Follow-up care  Follow up with your healthcare provider, or as advised. If a culture or imaging tests were done, you will be notified of the results when they are ready. This may take a few days or longer.  When to seek medical advice  Call your healthcare provider right away if any of these occur:  · Increased bleeding from the rectum  · Increased pain around the rectum or anus  · Weakness or dizziness  Call 911  Call 911 or return to the emergency department right away if any of these occur:  · Trouble breathing or swallowing  · Fainting or loss of consciousness  · Unusually fast heart rate  · Vomiting blood  · Large amounts of blood in stool  Date Last Reviewed: 6/22/2015  ©  7104-5307 [employer]. [address] [place] [address], [place] [address]. All rights reserved. This information is not intended as a substitute for professional medical care. Always follow your healthcare professional's instructions.        Understanding Colon and Rectal Polyps    The colon (also called the large intestine) is a muscular tube that forms the last part of the digestive tract. It absorbs water and stores food waste. The colon is about 4 to 6 feet long. The rectum is the last 6 inches of the colon. The colon and rectum have a smooth lining composed of millions of cells. Changes in these cells can lead to growths in the colon that can become cancerous and should be removed. Multiple tests are available to screen for colon cancer, but the colonoscopy is the most recommended test. During colonoscopy, these polyps can be removed. How often you need this test depends on many things including your condition, your family history, symptoms, and what the findings were at the previous colonoscopy.   When the colon lining changes  Changes that happen in the cells that line the colon or rectum can lead to growths called polyps. Over a period of years, polyps can turn cancerous. Removing polyps early may prevent cancer from ever forming.  Polyps  Polyps are fleshy clumps of tissue that form on the lining of the colon or rectum. Small polyps are usually benign (not cancerous). However, over time, cells in a polyp can change and become cancerous. Certain types of polyps known as adenomatous polyps are premalignant. The risk for invasive cancer increases with the size of the polyp and certain cell and gene features. This means that they can become cancerous if they're not removed. Hyperplastic polyps are benign. They can grow quite large and not turn cancerous.   Cancer  Almost all colorectal cancers start when polyp cells begin growing abnormally. As a cancerous tumor grows, it may involve more and more of the  colon or rectum. In time, cancer can also grow beyond the colon or rectum and spread to nearby organs or to glands called lymph nodes. The cells can also travel to other parts of the body. This is known as metastasis. The earlier a cancerous tumor is removed, the better the chance of preventing its spread.    Date Last Reviewed: 8/1/2016 © 2000-2016 [employer]. [address] [place] [address], [place] [address]. All rights reserved. This information is not intended as a substitute for professional medical care. Always follow your healthcare professional's instructions.        Anesthesia: Monitored Anesthesia Care (MAC)  Youre due to have surgery. During surgery, youll be given medication called anesthesia. This will keep you comfortable and pain-free. Your surgeon will use monitored anesthesia care (MAC). This sheet tells you more about this type of anesthesia.    What is monitored anesthesia care?  MAC keeps you very drowsy during surgery. You may be awake, but you will likely not remember much. And you wont feel pain. With MAC, medications are given through an IV line into a vein in your arm or hand. A local anesthetic will usually be injected into the skin and muscle around the surgical site to numb it. The anesthesia provider monitors you during the procedure. He or she checks your heart rate and rhythm, blood pressure, and blood oxygen level.  Anesthesia tools and medications that may be near you during your procedure  You will likely have:  · A pulse oximeter on the end of your finger. This measures your blood oxygen level.  · Electrocardiography leads (electrodes) on your chest. These record your heart rate and rhythm.  · Medications given through an IV. These relax you and prevent pain. You may be awake or sleep lightly. If you have local anesthetic, it is injected directly into your skin.  · A facemask to give you oxygen, if needed.  Risks and Possible Complications  MAC has some risks. These  include:  · Breathing problems  · Nausea and vomiting  · Allergic reaction to the anesthetic    Anesthesia safety  · Follow all instructions you are given for how long not to eat or drink before your procedure.  · Be sure your doctor knows what medications you take, especially any anti-inflammatory medication or blood thinners. This includes aspirin and any other over-the-counter medications, herbs, and supplements.  · Have an adult family member or friend drive you home after the procedure.  · For the first 24 hours after your surgery:  ¨ Do not drive or use heavy equipment.  ¨ Do not make important decisions or sign documents.  ¨ Avoid alcohol.  ¨ Have someone stay with you, if possible. They can watch for problems and help keep you safe.  Date Last Reviewed: 10/16/2014  © 3965-1862 [employer]. [address] [place] [address], [place] [address]. All rights reserved. This information is not intended as a substitute for professional medical care. Always follow your healthcare professional's instructions.

## 2017-04-11 NOTE — IP AVS SNAPSHOT
[address] [place] [address]  Phone: [phone]           Patient Discharge Instructions   Our goal is to set you up for success. This packet includes information on your condition, medications, and your home care.  It will help you care for yourself to prevent having to return to the hospital.     Please ask your nurse if you have any questions.      There are many details to remember when preparing to leave the hospital. Here is what you will need to do:    1. Take your medicine. If you are prescribed medications, review your Medication List on the following pages. You may have new medications to  at the pharmacy and others that you'll need to stop taking. Review the instructions for how and when to take your medications. Talk with your doctor or nurses if you are unsure of what to do.     2. Go to your follow-up appointments. Specific follow-up information is listed in the following pages. Your may be contacted by a nurse or clinical provider about future appointments. Be sure we have all of the phone numbers to reach you. Please contact your provider's office if you are unable to make an appointment.     3. Watch for warning signs. Your doctor or nurse will give you detailed warning signs to watch for and when to call for assistance. These instructions may also include educational information about your condition. If you experience any of warning signs to your health, call your doctor.           [facility]  Unless otherwise directed by your provider, please   contact [facility], our nurse care line   that is available for 24/7 assistance.     [phone] (toll-free)     Registered nurses in the [facility]   provide: appointment scheduling, clinical advisement, health education, and other advisory services.                  ** Verify the list of medication(s) below is accurate and up to date. Carry this with you in case of  emergency. If your medications have changed, please notify your healthcare provider.             Medication List      CONTINUE taking these medications        Additional Info                      amlodipine-valsartan  mg per tablet   Commonly known as:  EXFORGE   Quantity:  90 tablet   Refills:  3   Dose:  1 tablet    Instructions:  Take 1 tablet by mouth once daily.     Begin Date    AM    Noon    PM    Bedtime       chlorthalidone 25 MG Tab   Commonly known as:  HYGROTEN   Quantity:  30 tablet   Refills:  5   Dose:  25 mg    Instructions:  Take 1 tablet (25 mg total) by mouth once daily.     Begin Date    AM    Noon    PM    Bedtime       diclofenac sodium 1 % Gel   Quantity:  100 g   Refills:  0    Instructions:  Apply 2 grams topically up to 4 times daily to affected joint.     Begin Date    AM    Noon    PM    Bedtime       gabapentin 300 MG capsule   Commonly known as:  NEURONTIN   Refills:  1    Instructions:  TK 1 C PO HS     Begin Date    AM    Noon    PM    Bedtime       hydrocodone-acetaminophen 10-325mg  mg Tab   Commonly known as:  NORCO   Refills:  0      Begin Date    AM    Noon    PM    Bedtime       insulin glargine 100 unit/mL (3 mL) Inpn pen   Commonly known as:  LANTUS SOLOSTAR   Quantity:  15 Box   Refills:  1    Instructions:  ADMINISTER 25 UNITS UNDER THE SKIN DAILY TWICE DAILY     Begin Date    AM    Noon    PM    Bedtime       lidocaine-prilocaine cream   Commonly known as:  EMLA   Refills:  0      Begin Date    AM    Noon    PM    Bedtime       metformin 500 MG 24 hr tablet   Commonly known as:  GLUCOPHAGE-XR   Quantity:  360 tablet   Refills:  1    Instructions:  2 TABLETS TWICE DAILY WITH MEALS     Begin Date    AM    Noon    PM    Bedtime       metoprolol succinate 100 MG 24 hr tablet   Commonly known as:  TOPROL-XL   Quantity:  30 tablet   Refills:  11   Dose:  100 mg    Instructions:  Take 1 tablet (100 mg total) by mouth once daily.     Begin Date    AM    Noon    PM     "Bedtime       pen needle, diabetic 31 gauge x 5/16" Ndle   Commonly known as:  BD INSULIN PEN NEEDLE UF SHORT   Quantity:  100 each   Refills:  5    Instructions:  USE EVERY DAY WITH LANTUS     Begin Date    AM    Noon    PM    Bedtime       tiotropium 18 mcg inhalation capsule   Commonly known as:  SPIRIVA WITH HANDIHALER   Quantity:  90 capsule   Refills:  1   Dose:  18 mcg    Instructions:  Inhale 1 capsule (18 mcg total) into the lungs once daily.     Begin Date    AM    Noon    PM    Bedtime       zolpidem 10 mg Tab   Commonly known as:  AMBIEN   Refills:  0   Dose:  10 mg    Instructions:  Take 10 mg by mouth nightly as needed.     Begin Date    AM    Noon    PM    Bedtime                  Please bring to all follow up appointments:    1. A copy of your discharge instructions.  2. All medicines you are currently taking in their original bottles.  3. Identification and insurance card.    Please arrive 15 minutes ahead of scheduled appointment time.    Please call 24 hours in advance if you must reschedule your appointment and/or time.        Your Scheduled Appointments     Apr 18, 2017  9:00 AM CDT   Established Physical Therapy with GENERAL, PT   [facility] ([facility])    [address] [place] [id number]   [phone]            Apr 20, 2017  3:00 PM CDT   Established Physical Therapy with GENERAL, PT   [facility] ([facility])    [address] [place] [address]   [phone]            Apr 21, 2017 11:20 AM CDT   Established Patient Visit with JEFF Belcher - [facility] ([facility])    [address] [place] [address]   [phone]            Apr 25, 2017 10:00 AM CDT   Established Physical Therapy with Jonelle Grossman, CRISTINO   [facility] ([facility])    [address] [place] [address]   [phone]            Apr " 27, 2017  3:00 PM CDT   Established Physical Therapy with GENERAL, PT   [facility] ([facility])    [address]  [place] [id number] [phone]              Follow-up Information     Follow up with Jared Antonio MD.    Specialty:  Gastroenterology    Why:  As needed    Contact information:    [address]  [address]  [place] [address] [phone]          Discharge Instructions     Future Orders    Activity as tolerated     Diet general     Questions:    Total calories:      Fat restriction, if any:      Protein restriction, if any:      Na restriction, if any:      Fluid restriction:      Additional restrictions:          Discharge Instructions         Discharge Instructions: Eating a High-Fiber Diet  Your health care provider has prescribed a high-fiber diet for you. Fiber is what gives strength and structure to plants. Most grains, beans, vegetables, and fruits contain fiber. Foods rich in fiber are often low in calories and fat, but they fill you up more. These foods may also reduce the risk of certain health problems.  There are two types of fiber:  · Insoluble fiber. This is found in whole grains, cereals, and certain fruits and vegetables (such as apple skins, corn, and beans). Insoluble fiber is made up mainly of plant cell walls. It may prevent constipation and reduce the risk of certain types of cancer.  · Soluble fiber. This type of fiber is found in oats, beans, nuts, and certain fruits and vegetables (such as strawberries and peas). Soluble fiber turns to gel in the digestive system, slowing the movement of the digestive tract. It helps control blood sugar levels and can reduce cholesterol, which may help lower the risk of heart disease. Soluble fiber can also help control appetite.     Home care  · Know how much fiber you need a day. The recommended daily amount of fiber is 25 grams for women and 38 grams for men. After age 50, daily fiber  needs drop to 21 grams for women and 30 grams for men.  · Ask your doctor about a fiber supplement. (Always take fiber supplements with a large glass of water.)  · Keep track of how much fiber you eat.  · Eat a variety of foods high in fiber.  · Learn to read and understand food labels.  · Ask your healthcare provider how much water you should be drinking.  · Look for these high-fiber foods:  ¨ Whole-grain breads and cereals  § 6 ounces a day give you about 18 grams of fiber (1 ounce is equal to 1 slice of bread, 1 cup of dry cereal, or 1/2 cup of cooked rice).  § Include wheat and oat bran cereals, whole-wheat muffins or toast, and corn tortillas in your meals.  ¨ Fruits   § 2 cups a day give you about 8 grams of fiber.  § Apples, oranges, strawberries, pears, and bananas are good sources.  § Fruit juice does not contain as much fiber as the fruit it was made from.  ¨ Vegetables  § 2½ cups a day give you about 11 grams of fiber. Add asparagus, carrots, broccoli, peas, and corn to your meals.  ¨ Legumes  § 1/4 cup a day (in place of meat) gives you about 4 grams of fiber. Try navy beans, lentils, chickpeas, and soybeans.  ¨ Seeds   § A small handful of seeds gives you about 3 grams of fiber. Try sunflower seeds.  Follow-up  Make a follow-up appointment with a nutritionist as directed by our staff.  Date Last Reviewed: 6/1/2015 © 2000-2016 [employer]. [address] [place] [address], [place] [address]. All rights reserved. This information is not intended as a substitute for professional medical care. Always follow your healthcare professional's instructions.        Hemorrhoids    Hemorrhoids are swollen and inflamed veins inside the rectum and near the anus. The rectum is the last several inches of the colon. The anus is the passage between the rectum and the outside of the body.  Causes  The veins can become swollen due to increased pressure in them. This is most often caused by:  · Chronic constipation or  diarrhea  · Straining when having a bowel movement  · Sitting too long on the toilet  · A low-fiber diet  · Pregnancy  Symptoms  · Bleeding from the rectum (this may be noticeable after bowel movements)  · Lump near the anus  · Itching around the anus  · Pain around the anus  There are different types of hemorrhoids. Depending on the type you have and the severity, you may be able to treat yourself at home. In some cases, a procedure may be the best treatment option. Your healthcare provider can tell you more about this, if needed.  Home care  General care  · To get relief from pain or itching, try:  ¨ Topical products. Your healthcare provider may prescribe or recommend creams, ointments, or pads that can be applied to the hemorrhoid. Use these exactly as directed.  ¨ Medicines. Your healthcare provider may recommend stool softeners, suppositories, or laxatives to help manage constipation. Use these exactly as directed.  ¨ Sitz baths. A sitz bath involves sitting in a few inches of warm bath water. Be careful not to make the water so hot that you burn yourself--test it before sitting in it. Soak for about 10 to 15 minutes a few times a day. This may help relieve pain.  Tips to help prevent hemorrhoids  · Eat more fiber. Fiber adds bulk to stool and absorbs water as it moves through your colon. This makes stool softer and easier to pass.  ¨ Increase the fiber in your diet with more fiber-rich foods. These include fresh fruit, vegetables, and whole grains.  ¨ Take a fiber supplement or bulking agent, if advised to by your provider. These include products such as psyllium or methylcellulose.  · Drink plenty of water, if directed to by your provider. This can help keep stool soft.  · Be more active. Frequent exercise aids digestion and helps prevent constipation. It may also help make bowel movements more regular.  · Dont strain during bowel movements. This can make hemorrhoids more likely. Also, dont sit on the  toilet for long periods of time.  Follow-up care  Follow up with your healthcare provider, or as advised. If a culture or imaging tests were done, you will be notified of the results when they are ready. This may take a few days or longer.  When to seek medical advice  Call your healthcare provider right away if any of these occur:  · Increased bleeding from the rectum  · Increased pain around the rectum or anus  · Weakness or dizziness  Call 911  Call 911 or return to the emergency department right away if any of these occur:  · Trouble breathing or swallowing  · Fainting or loss of consciousness  · Unusually fast heart rate  · Vomiting blood  · Large amounts of blood in stool  Date Last Reviewed: 6/22/2015  © 1123-0502 [employer]. [address] [place] [address], [place] [address]. All rights reserved. This information is not intended as a substitute for professional medical care. Always follow your healthcare professional's instructions.        Understanding Colon and Rectal Polyps    The colon (also called the large intestine) is a muscular tube that forms the last part of the digestive tract. It absorbs water and stores food waste. The colon is about 4 to 6 feet long. The rectum is the last 6 inches of the colon. The colon and rectum have a smooth lining composed of millions of cells. Changes in these cells can lead to growths in the colon that can become cancerous and should be removed. Multiple tests are available to screen for colon cancer, but the colonoscopy is the most recommended test. During colonoscopy, these polyps can be removed. How often you need this test depends on many things including your condition, your family history, symptoms, and what the findings were at the previous colonoscopy.   When the colon lining changes  Changes that happen in the cells that line the colon or rectum can lead to growths called polyps. Over a period of years, polyps can turn cancerous. Removing polyps early  may prevent cancer from ever forming.  Polyps  Polyps are fleshy clumps of tissue that form on the lining of the colon or rectum. Small polyps are usually benign (not cancerous). However, over time, cells in a polyp can change and become cancerous. Certain types of polyps known as adenomatous polyps are premalignant. The risk for invasive cancer increases with the size of the polyp and certain cell and gene features. This means that they can become cancerous if they're not removed. Hyperplastic polyps are benign. They can grow quite large and not turn cancerous.   Cancer  Almost all colorectal cancers start when polyp cells begin growing abnormally. As a cancerous tumor grows, it may involve more and more of the colon or rectum. In time, cancer can also grow beyond the colon or rectum and spread to nearby organs or to glands called lymph nodes. The cells can also travel to other parts of the body. This is known as metastasis. The earlier a cancerous tumor is removed, the better the chance of preventing its spread.    Date Last Reviewed: 8/1/2016  © 1691-8738 [employer]. [address] [place] [address]. All rights reserved. This information is not intended as a substitute for professional medical care. Always follow your healthcare professional's instructions.        Anesthesia: Monitored Anesthesia Care (MAC)  Youre due to have surgery. During surgery, youll be given medication called anesthesia. This will keep you comfortable and pain-free. Your surgeon will use monitored anesthesia care (MAC). This sheet tells you more about this type of anesthesia.    What is monitored anesthesia care?  MAC keeps you very drowsy during surgery. You may be awake, but you will likely not remember much. And you wont feel pain. With MAC, medications are given through an IV line into a vein in your arm or hand. A local anesthetic will usually be injected into the skin and muscle around the surgical site to  numb it. The anesthesia provider monitors you during the procedure. He or she checks your heart rate and rhythm, blood pressure, and blood oxygen level.  Anesthesia tools and medications that may be near you during your procedure  You will likely have:  · A pulse oximeter on the end of your finger. This measures your blood oxygen level.  · Electrocardiography leads (electrodes) on your chest. These record your heart rate and rhythm.  · Medications given through an IV. These relax you and prevent pain. You may be awake or sleep lightly. If you have local anesthetic, it is injected directly into your skin.  · A facemask to give you oxygen, if needed.  Risks and Possible Complications  MAC has some risks. These include:  · Breathing problems  · Nausea and vomiting  · Allergic reaction to the anesthetic    Anesthesia safety  · Follow all instructions you are given for how long not to eat or drink before your procedure.  · Be sure your doctor knows what medications you take, especially any anti-inflammatory medication or blood thinners. This includes aspirin and any other over-the-counter medications, herbs, and supplements.  · Have an adult family member or friend drive you home after the procedure.  · For the first 24 hours after your surgery:  ¨ Do not drive or use heavy equipment.  ¨ Do not make important decisions or sign documents.  ¨ Avoid alcohol.  ¨ Have someone stay with you, if possible. They can watch for problems and help keep you safe.  Date Last Reviewed: 10/16/2014  © 1729-8312 [employer]. [address] [place] [address]. All rights reserved. This information is not intended as a substitute for professional medical care. Always follow your healthcare professional's instructions.            Admission Information     Date & Time Provider Department CSN    4/11/2017  9:12 AM Jared Antonio MD [facility] [id number]      Care Providers     Provider Role Specialty  "Primary office phone    Jared Antonio MD Attending Provider Gastroenterology [phone]    Jared Antonio MD Surgeon  Gastroenterology [phone]      Your Vitals Were     BP Pulse Temp Resp Height Weight    154/72 58 98 °F (36.7 °C) (Oral) 15 4' 11" (1.499 m) 61.7 kg (136 lb)    SpO2 BMI             99% 27.47 kg/m2         Recent Lab Values        6/5/2015 1/27/2016 4/22/2016 2/9/2017                 10:32 AM  9:59 AM  8:25 AM  8:24 AM        A1C 9.4 (H) 14.4 (H) 8.7 (H) 11.0 (H)        Comment for A1C at  8:24 AM on 2/9/2017:  According to ADA guidelines, hemoglobin A1C <7.0% represents  optimal control in non-pregnant diabetic patients.  Different  metrics may apply to specific populations.   Standards of Medical Care in Diabetes - 2016.  For the purpose of screening for the presence of diabetes:  <5.7%     Consistent with the absence of diabetes  5.7-6.4%  Consistent with increasing risk for diabetes   (prediabetes)  >or=6.5%  Consistent with diabetes  Currently no consensus exists for use of hemoglobin A1C  for diagnosis of diabetes for children.        Allergies as of 4/11/2017     No Known Allergies      Advance Directives     An advance directive is a document which, in the event you are no longer able to make decisions for yourself, tells your healthcare team what kind of treatment you do or do not want to receive, or who you would like to make those decisions for you.  If you do not currently have an advance directive, [facility] encourages you to create one.  For more information call:  [phone]), [phone] ([phone]),  or log on to [email or web address].        Language Assistance Services     ATTENTION: Language assistance services are available, free of charge. Please call [phone].      ATENCIÓN: Si habla español, tiene a otero disposición servicios gratuitos de asistencia lingüística. Llame al [phone].     CHÚ Ý: N?u b?n nói Ti?ng Vi?t, có các d?ch v? h? " tr? fadia moore? mi?n phí bethh cho b?n. G?i s? [phone].        Diabetes Discharge Instructions                                    [facility] complies with applicable Federal civil rights laws and does not discriminate on the basis of race, color, national origin, age, disability, or sex.

## 2017-04-11 NOTE — ANESTHESIA PREPROCEDURE EVALUATION
04/11/2017  Steff Johnson is a 57 y.o., female.    [facility] Anesthesia Evaluation    I have reviewed the Patient Summary Reports.    I have reviewed the Nursing Notes.      Review of Systems  Anesthesia Hx:  No problems with previous Anesthesia    Social:  Non-Smoker    Hematology/Oncology:  Hematology Normal   Oncology Normal     EENT/Dental:EENT/Dental Normal   Cardiovascular:   Hypertension, well controlled    Pulmonary:   COPD, moderate    Renal/:  Renal/ Normal     Musculoskeletal:  Musculoskeletal Normal    Neurological:  Neurology Normal    Endocrine:   Diabetes, type 2    Dermatological:  Skin Normal    Psych:  Psychiatric Normal           Physical Exam  General:  Well nourished    Airway/Jaw/Neck:  AIRWAY FINDINGS: Normal      Eyes/Ears/Nose:  EYES/EARS/NOSE FINDINGS: Normal   Dental:  DENTAL FINDINGS: Normal   Chest/Lungs:  Chest/Lungs Findings: Clear to auscultation, Normal Respiratory Rate     Heart/Vascular:  Heart Findings: Rate: Normal  Rhythm: Regular Rhythm  Sounds: Normal  Heart murmur: negative Vascular Findings: Normal    Abdomen:  Abdomen Findings: Normal    Musculoskeletal:  Musculoskeletal Findings: Normal   Skin:  Skin Findings: Normal    Mental Status:  Mental Status Findings: Normal        Anesthesia Plan  Type of Anesthesia, risks & benefits discussed:  Anesthesia Type:  general  Patient's Preference:   Intra-op Monitoring Plan:   Intra-op Monitoring Plan Comments:   Post Op Pain Control Plan:   Post Op Pain Control Plan Comments:   Induction:   IV  Beta Blocker:  Patient is on a Beta-Blocker and has received one dose within the past 24 hours (No further documentation required).       Informed Consent: Patient understands risks and agrees with Anesthesia plan.  Questions answered. Anesthesia consent signed with patient.  ASA Score: 2     Day of Surgery Review of History & Physical:     H&P update referred to the provider.         Ready For Surgery From Anesthesia Perspective.

## 2017-04-13 NOTE — PATIENT INSTRUCTIONS

## 2017-04-20 ENCOUNTER — CLINICAL SUPPORT (OUTPATIENT)
Dept: REHABILITATION | Facility: HOSPITAL | Age: 57
End: 2017-04-20
Attending: ORTHOPAEDIC SURGERY
Payer: MEDICARE

## 2017-04-20 DIAGNOSIS — M25.561 PAIN IN BOTH KNEES, UNSPECIFIED CHRONICITY: ICD-10-CM

## 2017-04-20 DIAGNOSIS — M25.562 PAIN IN BOTH KNEES, UNSPECIFIED CHRONICITY: ICD-10-CM

## 2017-04-20 PROCEDURE — 97110 THERAPEUTIC EXERCISES: CPT | Mod: PN

## 2017-04-20 PROCEDURE — 97010 HOT OR COLD PACKS THERAPY: CPT | Mod: PN

## 2017-04-20 NOTE — PROGRESS NOTES
Name: Steff Johnson  Clinic Number: [id number]  Date of Treatment: 04/20/2017   Diagnosis:   Encounter Diagnosis   Name Primary?    Pain in both knees, unspecified chronicity        Time in: 1500  Time Out: 1600  Total Treatment Time: 60  Group Time: NA      Subjective:    Steff reports no real changes in pain levels.  Pain limits her ability to perform her everyday tasks.  Patient reports their pain to be 6/10 on a 0-10 scale with 0 being no pain and 10 being the worst pain imaginable.    Objective    Steff received therapeutic exercises to develop strength, endurance and flexibility for 60 minutes including:     X 15 supine marley. LE there ex w/ 1# wt  X 15 seated marley. LE there ex w/ 1# wt  X 15 ea. SportsArt knee curl/knee ext (22#)  X 10 min NuStep  X 15 standing marley. LE there ex  X 15 min cold pack marley. knees    Assessment:      Mrs. Johnson is able to jose alejandro. tx w/out significant increase in symptoms.    Pt will continue to benefit from skilled PT intervention. Medical Necessity is demonstrated by:  Pain limits function of effected part for some activities, Unable to participate fully in daily activities and Weakness.    Patient is making good progress towards established goals.    New/Revised goals: NA      Plan:  Continue with established Plan of Care towards PT goals.

## 2017-04-21 ENCOUNTER — TELEPHONE (OUTPATIENT)
Dept: FAMILY MEDICINE | Facility: CLINIC | Age: 57
End: 2017-04-21

## 2017-04-21 ENCOUNTER — OFFICE VISIT (OUTPATIENT)
Dept: FAMILY MEDICINE | Facility: CLINIC | Age: 57
End: 2017-04-21
Payer: MEDICARE

## 2017-04-21 VITALS
DIASTOLIC BLOOD PRESSURE: 100 MMHG | BODY MASS INDEX: 27.82 KG/M2 | WEIGHT: 138 LBS | SYSTOLIC BLOOD PRESSURE: 170 MMHG | HEART RATE: 59 BPM | TEMPERATURE: 98 F | HEIGHT: 59 IN

## 2017-04-21 DIAGNOSIS — I10 ESSENTIAL HYPERTENSION: Primary | ICD-10-CM

## 2017-04-21 DIAGNOSIS — E11.65 TYPE 2 DIABETES MELLITUS WITH HYPERGLYCEMIA, WITH LONG-TERM CURRENT USE OF INSULIN: ICD-10-CM

## 2017-04-21 DIAGNOSIS — Z79.4 TYPE 2 DIABETES MELLITUS WITH HYPERGLYCEMIA, WITH LONG-TERM CURRENT USE OF INSULIN: ICD-10-CM

## 2017-04-21 PROBLEM — Z12.11 SCREEN FOR COLON CANCER: Status: RESOLVED | Noted: 2017-04-11 | Resolved: 2017-04-21

## 2017-04-21 PROCEDURE — 99499 UNLISTED E&M SERVICE: CPT | Mod: S$PBB,,, | Performed by: PHYSICIAN ASSISTANT

## 2017-04-21 PROCEDURE — 4010F ACE/ARB THERAPY RXD/TAKEN: CPT | Mod: S$GLB,,, | Performed by: PHYSICIAN ASSISTANT

## 2017-04-21 PROCEDURE — 99999 PR PBB SHADOW E&M-EST. PATIENT-LVL IV: CPT | Mod: PBBFAC,,, | Performed by: PHYSICIAN ASSISTANT

## 2017-04-21 PROCEDURE — 3078F DIAST BP <80 MM HG: CPT | Mod: S$GLB,,, | Performed by: PHYSICIAN ASSISTANT

## 2017-04-21 PROCEDURE — 3077F SYST BP >= 140 MM HG: CPT | Mod: S$GLB,,, | Performed by: PHYSICIAN ASSISTANT

## 2017-04-21 PROCEDURE — 1160F RVW MEDS BY RX/DR IN RCRD: CPT | Mod: S$GLB,,, | Performed by: PHYSICIAN ASSISTANT

## 2017-04-21 PROCEDURE — 3046F HEMOGLOBIN A1C LEVEL >9.0%: CPT | Mod: S$GLB,,, | Performed by: PHYSICIAN ASSISTANT

## 2017-04-21 PROCEDURE — 99213 OFFICE O/P EST LOW 20 MIN: CPT | Mod: S$GLB,,, | Performed by: PHYSICIAN ASSISTANT

## 2017-04-21 NOTE — MR AVS SNAPSHOT
[facility]  [address]  [place] [address]  Phone: [phone]  Fax: [phone]                  Steff Johnson   2017 11:20 AM   Office Visit    Description:  Female : 1960   Provider:  Rita Queen PA-C   Department:  [facility]           Reason for Visit     Follow-up           Diagnoses this Visit        Comments    Essential hypertension    -  Primary            To Do List           Future Appointments        Provider Department Dept Phone    2017 11:20 AM Rita Queen PA-C [facility] [phone]    2017 9:00 AM Rita Queen PA-C [facility] [phone]    2017 10:00 AM Jonelle Grossman PTA [facility] [phone]    2017 3:00 PM GENERAL, PT [facility] [phone]    2017 9:00 AM Jnoelle Grossman PTA [facility] [phone]      Goals (5 Years of Data)     None      [facility]     [facility] Nurse [facility] -  Assistance  Unless otherwise directed by your provider, please contact [facility], our nurse care line that is available for  assistance.     Registered nurses in the [facility] provide: appointment scheduling, clinical advisement, health education, and other advisory services.  Call: [phone] (toll free)               Medications           Message regarding Medications     Verify the changes and/or additions to your medication regime listed below are the same as discussed with your clinician today.  If any of these changes or additions are incorrect, please notify your healthcare provider.             Verify that the below list of medications is an accurate representation of the medications you are currently taking.  If none reported, the list may be blank. If incorrect, please contact your healthcare provider. Carry this list with you in case of emergency.           Current Medications   "   amlodipine-valsartan (EXFORGE)  mg per tablet Take 1 tablet by mouth once daily.    chlorthalidone (HYGROTEN) 25 MG Tab Take 1 tablet (25 mg total) by mouth once daily.    diclofenac sodium 1 % Gel Apply 2 grams topically up to 4 times daily to affected joint.    gabapentin (NEURONTIN) 300 MG capsule TK 1 C PO HS    hydrocodone-acetaminophen 10-325mg (NORCO)  mg Tab     insulin glargine (LANTUS SOLOSTAR) 100 unit/mL (3 mL) InPn pen ADMINISTER 25 UNITS UNDER THE SKIN DAILY TWICE DAILY    lidocaine-prilocaine (EMLA) cream     metformin (GLUCOPHAGE-XR) 500 MG 24 hr tablet 2 TABLETS TWICE DAILY WITH MEALS    metoprolol succinate (TOPROL-XL) 100 MG 24 hr tablet Take 1 tablet (100 mg total) by mouth once daily.    pen needle, diabetic (BD INSULIN PEN NEEDLE UF SHORT) 31 gauge x 5/16" Ndle USE EVERY DAY WITH LANTUS    tiotropium (SPIRIVA WITH HANDIHALER) 18 mcg inhalation capsule Inhale 1 capsule (18 mcg total) into the lungs once daily.    zolpidem (AMBIEN) 10 mg tablet Take 10 mg by mouth nightly as needed.             Clinical Reference Information           Your Vitals Were     BP Pulse Temp Height Weight BMI    170/100 (BP Location: Right arm, Patient Position: Sitting, BP Method: Manual) 59 98 °F (36.7 °C) (Oral) 4' 11" (1.499 m) 62.6 kg (138 lb 0.1 oz) 27.87 kg/m2      Blood Pressure          Most Recent Value    BP  (!)  170/100      Allergies as of 4/21/2017     No Known Allergies      Immunizations Administered on Date of Encounter - 4/21/2017     None      Instructions      Controlling High Blood Pressure  High blood pressure (hypertension) is often called the silent killer. This is because many people who have it dont know it. High blood pressure is defined as 140/90 mm Hg or higher. Know your blood pressure and remember to check it regularly. Doing so can save your life. Here are some things you can do to help control your blood pressure.    Choose heart-healthy foods  · Select low-salt, " low-fat foods. Limit sodium intake to 2,400 mg per day or the amount suggested by your healthcare provider.  · Limit canned, dried, cured, packaged, and fast foods. These can contain a lot of salt.  · Eat 8 to 10 servings of fruits and vegetables every day.  · Choose lean meats, fish, or chicken.  · Eat whole-grain pasta, brown rice, and beans.  · Eat 2 to 3 servings of low-fat or fat-free dairy products.  · Ask your doctor about the DASH eating plan. This plan helps reduce blood pressure.  · When you go to a restaurant, ask that your meal be prepared with no added salt.  Maintain a healthy weight  · Ask your healthcare provider how many calories to eat a day. Then stick to that number.  · Ask your healthcare provider what weight range is healthiest for you. If you are overweight, a weight loss of only 3% to 5% of your body weight can help lower blood pressure. Generally, a good weight loss goal is to lose 10% of your body weight in a year.  · Limit snacks and sweets.  · Get regular exercise.  Get up and get active  · Choose activities you enjoy. Find ones you can do with friends or family. This includes bicycling, dancing, walking, and jogging.  · Park farther away from building entrances.  · Use stairs instead of the elevator.  · When you can, walk or bike instead of driving.  · [place] leaves, garden, or do household repairs.  · Be active at a moderate to vigorous level of physical activity for at least 40 minutes for a minimum of 3 to 4 days a week.   Manage stress  · Make time to relax and enjoy life. Find time to laugh.  · Communicate your concerns with your loved ones and your healthcare provider.  · Visit with family and friends, and keep up with hobbies.  Limit alcohol and quit smoking  · Men should have no more than 2 drinks per day.  · Women should have no more than 1 drink per day.  · Talk with your healthcare provider about quitting smoking. Smoking significantly increases your risk for heart disease and  stroke. Ask your healthcare provider about community smoking cessation programs and other options.  Medicines  If lifestyle changes arent enough, your healthcare provider may prescribe high blood pressure medicine. Take all medicines as prescribed. If you have any questions about your medicines, ask your healthcare provider before stopping or changing them.   Date Last Reviewed: 4/27/2016 © 2000-2016 [employer]. [address] [place] [address], [place] [address]. All rights reserved. This information is not intended as a substitute for professional medical care. Always follow your healthcare professional's instructions.        Diabetes (General Information)  Diabetes is a long-term health problem. It means your body does not make enough insulin. Or it may mean that your body cannot use the insulin it makes. Insulin is a hormone in your body. It lets blood sugar (glucose) reach the cells in your body. All of your cells need glucose for fuel.  When you have diabetes, the glucose in your blood builds up because it cannot get into the cells. This buildup is called high blood sugar (hyperglycemia).  Your blood sugar level depends on several things. It depends on what kind of food you eat and how much of it you eat. It also depends on how much exercise you get, and how much insulin you have in your body. Eating too much of the wrong kinds of food or not taking diabetes medicine on time can cause high blood sugar. Infections can cause high blood sugar even if you are taking medicines correctly.  These things can also cause low blood sugar:  · Missing meals  · Not eating enough food  · Taking too much diabetes medicine  Diabetes can cause serious problems over time if you do not get treated. These problems include heart disease, stroke, kidney failure, and blindness. They also include nerve pain or loss of feeling in your legs and feet, and gangrene of the feet. By keeping your blood sugar under control you can  prevent or delay these problems.  Normal blood sugar levels are 80 to 100 before a meal and less than 180 in the 1 to 2 hours after a meal.  Home care  Follow these guidelines when caring for yourself at home:  · Follow the diet your healthcare provider gives you. Take insulin or other diabetes medicine exactly as told to.  · Watch your blood sugar as you are told to. Keep a log of your results. This will help your provider change your medicines to keep your blood sugar under control.  · Try to reach your ideal weight. You may be able to cut back on or not have to take diabetes medicine if you eat the right foods and get exercise.  · Do not smoke. Smoking worsens the effects of diabetes on your circulation. You are much more likely to have a heart attack if you have diabetes and you smoke.  · Take good care of your feet. If you have lost feeling in your feet, you may not see an injury or infection. Check your feet and between your toes at least once a week.  · Wear a medical alert bracelet or necklace, or carry a card in your wallet that says you have diabetes. This will help healthcare providers give you the right care if you get very ill and cannot tell them that you have diabetes.  Sick day plan  If you get a cold, the flu, or a bacterial or viral infection, take these steps:  · Look at your diabetes sick plan and call your healthcare provider as you were told to. You may need to call your provider right away if:  ¨ Your blood sugar is above 240 while taking your diabetes medicine  ¨ Your urine ketone levels are above normal or high  ¨ You have been vomiting more than 6 hours  ¨ You have trouble breathing or your breath ha s a fruity smell  ¨ You have a high fever  ¨ You have a fever for several days and you are not getting better  ¨ You get light-headed and are sleepier than usual  · Keep taking your diabetes pills (oral medicine) even if you have been vomiting and are feeling sick. Call your provider right away  because you may need insulin to lower your blood sugar until you recover from your illness.  · Keep taking your insulin even if you have been vomiting and are feeling sick. Call your provider right away to ask if you need to change your insulin dose. This will depend on your blood sugar results.  · Check your blood sugar every 2 to 4 hours, or at least 4 times a day.  · Check your ketones often. If you are vomiting and having diarrhea, watch them more often.  · Do not skip meals. Try to eat small meals on a regular schedule. Do this even if you do not feel like eating.  · Drink water or other liquids that do not have caffeine or calories. This will keep you from getting dehydrated. If you are nauseated or vomiting, takes small sips every 5 minutes. To prevent dehydration try to drink a cup (8 ounces) of fluids every hour while you are awake.  General care  Always bring a source of fast-acting sugar with you in case you have symptoms of low blood sugar (below 70). At the first sign of low blood sugar, eat or drink 15 to 20 grams of fast-acting sugar to raise your blood sugar. Examples are:  · 3 to 4 glucose tablets. You can buy these at most [employer].  · 4 ounces (1/2 cup) of regular (not diet) soft drinks  · 4 ounces (1/2 cup) of any fruit juice  · 8 ounces (1 cup) of milk  · 5 to 6 pieces of hard candy  · 1 tablespoon of honey  Check your blood sugar 15 minutes after treating yourself. If it is still below 70, take 15 to 20 more grams of fast-acting sugar. Test again in 15 minutes. If it returns to normal (70 or above), eat a snack or meal to keep your blood sugar in a safe range. If it stays low, call your doctor or go to an emergency room.  Follow-up care  Follow-up with your healthcare provider, or as advised. For more information about diabetes, visit the American Diabetes Association website at www.diabetes.org or call 040-739-6939.  When to seek medical advice  Call your healthcare provider right away if  you have any of these symptoms of high blood sugar:  · Frequent urination  · Dizziness  · Drowsiness  · Thirst  · Headache  · Nausea or vomiting  · Abdominal pain  · Eyesight changes  · Fast breathing  · Confusion or loss of consciousness  Also call your provider right away if you have any of these signs of low blood sugar:  · Fatigue  · Headache  · Shakes  · Excess sweating  · Hunger  · Feeling anxious or restless  · Eyesight changes  · Drowsiness  · Weakness  · Confusion or loss of consciousness  Call 911  Call for emergency help right away if any of these occur:  · Chest pain or shortness of breath  · Dizziness or fainting  · Weakness of an arm or leg or one side of the face  · Trouble speaking or seeing   Date Last Reviewed: 6/1/2016 © 2000-2016 [employer]. [address] [place] [address]. All rights reserved. This information is not intended as a substitute for professional medical care. Always follow your healthcare professional's instructions.             Language Assistance Services     ATTENTION: Language assistance services are available, free of charge. Please call [phone].      ATENCIÓN: Si habla español, tiene a otero disposición servicios gratuitos de asistencia lingüística. Llame al [phone].     CHÚ Ý: N?u b?n nói Ti?ng Vi?t, có các d?ch v? h? tr? ngôn ng? mi?n phí dành cho b?n. G?i s? [phone].         [facility] complies with applicable Federal civil rights laws and does not discriminate on the basis of race, color, national origin, age, disability, or sex.

## 2017-04-21 NOTE — TELEPHONE ENCOUNTER
----- Message from Gracia Clinton sent at 4/21/2017 11:30 AM CDT -----  Contact: self [phone]  She is requesting a refill of TruTest Strips.  Please send them to:   [facility] [address] [place] [address] & [address] [address]  Phone: [phone] Fax: [phone]    Thank you!

## 2017-04-21 NOTE — PROGRESS NOTES
Subjective:       Patient ID: Steff Johnson is a 57 y.o. female.    Chief Complaint: Follow-up (DM)    HPI Comments: Mrs. Johnson is a 57 year old female who presents to clinic for follow up on uncontrolled hypertension. Since her last visit, she has been tolerating chlorthalidone without side effects. She brings in BP log with readings from 120//60. She denies chest pain, shortness of breath, or lower ext edema. She has been compliant with exforge and toprol XL. BP is elevated today, but patient did not take any medication today. Blood sugars have been much better controlled with fasting AM readings  and afternoon readings 100-150. She denies episodes of hypoglycemia. She is eating more salads. She underwent colonoscopy, which showed 2 polyps and she is waiting pathology results.     Review of Systems   Constitutional: Negative for activity change, appetite change, chills, fatigue and fever.   Eyes: Negative for visual disturbance.   Respiratory: Negative for cough and shortness of breath.    Cardiovascular: Negative for chest pain, palpitations and leg swelling.   Gastrointestinal: Negative for abdominal pain, constipation, diarrhea, nausea and vomiting.   Musculoskeletal: Negative for arthralgias.   Neurological: Negative for dizziness, weakness, light-headedness and headaches.       Objective:      Vitals:    04/21/17 1013   BP: (!) 170/100   Pulse:    Temp:      Physical Exam   Constitutional: She is oriented to person, place, and time. She appears well-developed and well-nourished.   HENT:   Head: Normocephalic and atraumatic.   Eyes: Conjunctivae and EOM are normal. Pupils are equal, round, and reactive to light.   Cardiovascular: Normal rate, regular rhythm, normal heart sounds and intact distal pulses.    No lower ext edema.   Pulmonary/Chest: Effort normal and breath sounds normal.   Neurological: She is alert and oriented to person, place, and time.   Skin: Skin is warm and dry.   Psychiatric:  She has a normal mood and affect. Her behavior is normal.   Vitals reviewed.      Assessment:       1. Essential hypertension    2. Type 2 diabetes mellitus with hyperglycemia, with long-term current use of insulin        Plan:       Essential hypertension        - Uncontrolled today in clinic due to non-compliance. Pt advised to go home and take medications ASAP. Nursing BP check next Monday/Tuesday and patient to bring BP cuff to check for accuracy (which is unlikely due to readings provided). Will make further recommendations pending BP review.     Type 2 diabetes mellitus with hyperglycemia, with long-term current use of insulin      - Improving control, continue lantus 25 units BID and metformin as prescribed.       - Continue to check BG 4 times daily      - HgbA1c scheduled for next month    Patient readiness: acceptance and barriers:none    During the course of the visit the patient was educated and counseled about the following:     Diabetes:  Reminded to bring in blood sugar diary at next visit.  Hypertension:   Medication: no change.  Dietary sodium restriction.  Regular aerobic exercise.  Check blood pressures daily and record.    Goals: Diabetes: Maintain Hemoglobin A1C below 7 and Hypertension: Reduce Blood Pressure    Did patient meet goals/outcomes: No to HTN or DM    The following self management tools provided: declined    Patient Instructions (the written plan) was given to the patient/family.     Time spent with patient: 15 minutes

## 2017-04-24 ENCOUNTER — TELEPHONE (OUTPATIENT)
Dept: GASTROENTEROLOGY | Facility: CLINIC | Age: 57
End: 2017-04-24

## 2017-04-24 ENCOUNTER — DOCUMENTATION ONLY (OUTPATIENT)
Dept: FAMILY MEDICINE | Facility: CLINIC | Age: 57
End: 2017-04-24

## 2017-04-24 NOTE — PROGRESS NOTES
Pre-Visit Chart Review  For Appointment Scheduled on 4/25/17    Health Maintenance Due   Topic Date Due    TETANUS VACCINE  01/08/1976    Eye Exam  06/01/2016    Lipid Panel  06/05/2016    Mammogram  06/17/2017

## 2017-04-25 ENCOUNTER — CLINICAL SUPPORT (OUTPATIENT)
Dept: FAMILY MEDICINE | Facility: CLINIC | Age: 57
End: 2017-04-25
Payer: MEDICARE

## 2017-04-25 ENCOUNTER — TELEPHONE (OUTPATIENT)
Dept: FAMILY MEDICINE | Facility: CLINIC | Age: 57
End: 2017-04-25

## 2017-04-25 ENCOUNTER — CLINICAL SUPPORT (OUTPATIENT)
Dept: REHABILITATION | Facility: HOSPITAL | Age: 57
End: 2017-04-25
Attending: ORTHOPAEDIC SURGERY
Payer: MEDICARE

## 2017-04-25 VITALS
SYSTOLIC BLOOD PRESSURE: 170 MMHG | BODY MASS INDEX: 28 KG/M2 | DIASTOLIC BLOOD PRESSURE: 88 MMHG | WEIGHT: 138.88 LBS | HEIGHT: 59 IN | HEART RATE: 64 BPM

## 2017-04-25 DIAGNOSIS — M25.562 PAIN IN BOTH KNEES, UNSPECIFIED CHRONICITY: ICD-10-CM

## 2017-04-25 DIAGNOSIS — I10 UNCONTROLLED HYPERTENSION: ICD-10-CM

## 2017-04-25 DIAGNOSIS — M25.561 PAIN IN BOTH KNEES, UNSPECIFIED CHRONICITY: ICD-10-CM

## 2017-04-25 DIAGNOSIS — I10 ESSENTIAL HYPERTENSION: Primary | ICD-10-CM

## 2017-04-25 PROCEDURE — 97110 THERAPEUTIC EXERCISES: CPT | Mod: PN

## 2017-04-25 PROCEDURE — 99999 PR PBB SHADOW E&M-EST. PATIENT-LVL III: CPT | Mod: PBBFAC,,, | Performed by: PHYSICIAN ASSISTANT

## 2017-04-25 NOTE — NURSING
2 patient identifiers used (name & ). Patient came in for nurse blood pressure check. Automatic reading was 179/86  P-64. Right arm resting on desk, feet flat on floor, back straight. Patient had no c/o pain. Patient stated that they took their prescribed blood pressure medication this am. Allergies and medications reviewed with the patient. Blood pressure re-checked after 5 minutes with manual cuff, reading was 170/88. Patient advised to continue taking medications as prescribed and follow up as scheduled. Patient advised that message will be sent to the provider for recommendations and we will call with the reply.

## 2017-04-25 NOTE — PROGRESS NOTES
Name: Steff Johnson  [facility] Number: [id number]  Date of Treatment: 04/25/2017   Diagnosis:   Encounter Diagnosis   Name Primary?    Pain in both knees, unspecified chronicity        Time in: 1003  Time Out: 1056  Total Treatment Time: 53      Subjective:    Steff reports no pain today.  Patient reports their pain to be 0/10 on a 0-10 scale with 0 being no pain and 10 being the worst pain imaginable.    Objective    Patient received individual therapy to increase strength, ROM and flexibility with activities as follows:     Steff received therapeutic exercises to develop strength, ROM and flexibility for 53 minutes including:     Bike x 10 minutes level 1.5  Standing gastroc stretch 3 x 30 sec B  Mini squats on airex x 30  HR/TR x 30 on ariex  Marches x 30 B on airex  SLs 3 x 30 sec R/L each  Shuttle: 37# x 30 B, 25# R/L x 30 each  LAQ 2# x 30 B  Ball squeeze x 30  Hamstring stretch 3 x 30 sec B    Written Home Exercises Provided: cont HEP  Pt demo good understanding of the education provided. Steff demonstrated good return demonstration of activities.     Assessment:     Good tolerance for increased activity.  No increase in s/s reported.  Pt will continue to benefit from skilled PT intervention. Medical Necessity is demonstrated by:  Pain limits function of effected part for some activities, Unable to participate fully in daily activities, Requires skilled supervision to complete and progress HEP and Weakness.    Patient is making good progress towards established goals.      Plan:  Continue with established Plan of Care towards PT goals.

## 2017-04-25 NOTE — TELEPHONE ENCOUNTER
2 patient identifiers used (name & ). Patient came in for nurse blood pressure check. Automatic reading was 179/86  P-64. Right arm resting on desk, feet flat on floor, back straight. Patient had no c/o pain. Patient stated that they took their prescribed blood pressure medication this am. Allergies and medications reviewed with the patient. Blood pressure re-checked after 5 minutes with manual cuff, reading was 170/88. Patient advised to continue taking medications as prescribed and follow up as scheduled. Patient advised that message will be sent to the provider for recommendations and we will call with the reply.     Please advise

## 2017-04-28 RX ORDER — METOPROLOL SUCCINATE 200 MG/1
200 TABLET, EXTENDED RELEASE ORAL DAILY
Qty: 30 TABLET | Refills: 2 | Status: SHIPPED | OUTPATIENT
Start: 2017-04-28 | End: 2018-01-24 | Stop reason: SDUPTHER

## 2017-04-28 NOTE — TELEPHONE ENCOUNTER
Patient notified that her BP remains elevated, we need to increase toprol  mg daily, and continue chlorthalidone 25 mg, and exforge 10/320 mg daily. I have sent a new prescription to the [facility]. She needs to bring her BP log to her f/u visit with Dr. Ren in May. Patient verbalized understanding

## 2017-04-28 NOTE — TELEPHONE ENCOUNTER
BP remains elevated, we need to increase toprol  mg daily, and continue chlorthalidone 25 mg, and exforge 10/320 mg daily. I have sent a new prescription to the [facility]. She needs to bring her BP log to her f/u visit with Dr. Ren in May.

## 2017-05-02 ENCOUNTER — CLINICAL SUPPORT (OUTPATIENT)
Dept: REHABILITATION | Facility: HOSPITAL | Age: 57
End: 2017-05-02
Attending: ORTHOPAEDIC SURGERY
Payer: MEDICARE

## 2017-05-02 DIAGNOSIS — M25.562 PAIN IN BOTH KNEES, UNSPECIFIED CHRONICITY: ICD-10-CM

## 2017-05-02 DIAGNOSIS — M25.561 PAIN IN BOTH KNEES, UNSPECIFIED CHRONICITY: ICD-10-CM

## 2017-05-02 PROCEDURE — 97110 THERAPEUTIC EXERCISES: CPT | Mod: PN

## 2017-05-02 NOTE — PROGRESS NOTES
"Name: Steff TIERNEY Johnson  Clinic Number: [id number]  Date of Treatment: 05/02/2017   Diagnosis:   Encounter Diagnosis   Name Primary?    Pain in both knees, unspecified chronicity        Time in: 9:00  Time Out: 10:00  Total Treatment Time: 58      Subjective:    Steff reports she is "feeling good today".  Patient reports their pain to be 0/10 on a 0-10 scale with 0 being no pain and 10 being the worst pain imaginable.    Objective    Patient received individual therapy to increase strength, flexibility and ROM with activities as follows:     Steff received therapeutic exercises to develop strength, ROM and flexibility for 58 minutes including:     Bike 10' level 1.5  Standing bilateral gastroc stretch 3 x 30"  SLS R/L 3 x 30"  March on Airex 30x  Squats on Airex 30x  HR on Airex 30x  Shuttle bilateral 37# 30x  Shuttle R/L 25# 30x each  [employer]Art: Knee Flexion 22# 3 x 10, Knee Extension 11# 3 x 10  Seated Hamstring Stretch 3 x 30"  Seated ball squeeze 30x  SLR 1# cuff weight R/L 30x each  Clamshells with RTB 30x    Written Home Exercises Provided: cont HEP  Pt demo good understanding of the education provided. Steff demonstrated good return demonstration of activities.     Assessment:       Pt will continue to benefit from skilled PT intervention. Medical Necessity is demonstrated by:  Requires skilled supervision to complete and progress HEP.    Patient is making good progress towards established goals.    Pt tended to bring knees beyond toes, v/c to keep knees behind toes with squats.  VC to ER B LE's equally with clamshells. V/C  for eccentric control during knee flexion ([employer]) exercise.        Plan:  Continue with established Plan of Care towards PT goals.   I certify that I was present in the room directing the student in service delivery and guiding them using my skilled judgment. As the co-signing therapist I have reviewed the students documentation and am responsible for the treatment, assessment, and " plan.

## 2017-05-04 ENCOUNTER — TELEPHONE (OUTPATIENT)
Dept: REHABILITATION | Facility: HOSPITAL | Age: 57
End: 2017-05-04

## 2017-05-08 ENCOUNTER — LAB VISIT (OUTPATIENT)
Dept: LAB | Facility: HOSPITAL | Age: 57
End: 2017-05-08
Attending: FAMILY MEDICINE
Payer: MEDICARE

## 2017-05-08 DIAGNOSIS — Z79.4 TYPE 2 DIABETES MELLITUS WITH HYPERGLYCEMIA, WITH LONG-TERM CURRENT USE OF INSULIN: ICD-10-CM

## 2017-05-08 DIAGNOSIS — E11.65 TYPE 2 DIABETES MELLITUS WITH HYPERGLYCEMIA, WITH LONG-TERM CURRENT USE OF INSULIN: ICD-10-CM

## 2017-05-08 LAB
CHOLEST/HDLC SERPL: 2.9 {RATIO}
HDL/CHOLESTEROL RATIO: 34.9 %
HDLC SERPL-MCNC: 172 MG/DL
HDLC SERPL-MCNC: 60 MG/DL
LDLC SERPL CALC-MCNC: 91.8 MG/DL
NONHDLC SERPL-MCNC: 112 MG/DL
TRIGL SERPL-MCNC: 101 MG/DL

## 2017-05-08 PROCEDURE — 36415 COLL VENOUS BLD VENIPUNCTURE: CPT | Mod: PO

## 2017-05-08 PROCEDURE — 83036 HEMOGLOBIN GLYCOSYLATED A1C: CPT

## 2017-05-08 PROCEDURE — 80061 LIPID PANEL: CPT

## 2017-05-09 ENCOUNTER — CLINICAL SUPPORT (OUTPATIENT)
Dept: REHABILITATION | Facility: HOSPITAL | Age: 57
End: 2017-05-09
Attending: ORTHOPAEDIC SURGERY
Payer: MEDICARE

## 2017-05-09 DIAGNOSIS — M25.561 PAIN IN BOTH KNEES, UNSPECIFIED CHRONICITY: ICD-10-CM

## 2017-05-09 DIAGNOSIS — M25.562 PAIN IN BOTH KNEES, UNSPECIFIED CHRONICITY: ICD-10-CM

## 2017-05-09 LAB
ESTIMATED AVG GLUCOSE: 197 MG/DL
HBA1C MFR BLD HPLC: 8.5 %

## 2017-05-09 PROCEDURE — 97110 THERAPEUTIC EXERCISES: CPT | Mod: PN

## 2017-05-09 NOTE — PROGRESS NOTES
Name: Steff TIERNEY Johnson  Clinic Number: [id number]  Date of Treatment: 05/09/2017   Diagnosis:   Encounter Diagnosis   Name Primary?    Pain in both knees, unspecified chronicity        Time in: 0855  Time Out:   Total Treatment Time: ***  Group Time: ***      Subjective:    Steff reports {AMB PT PROGRESS SUBJECTIVE REPORTS:88617}.  Patient reports their pain to be {NUMBERS 1-10:79160}/10 on a 0-10 scale with 0 being no pain and 10 being the worst pain imaginable.    Objective    Patient received {INDIVIDUAL:46866} therapy to increase {AMB PT PROGRESS OBJECTIVE:03645} with *** patients with activities as follows:     Steff received therapeutic exercises to develop {AMB PT PROGRESS OBJECTIVE:42708} for *** minutes including: ***    Bike x 15 min L-3                  Steff received the following manual therapy techniques: {AMB PT PROGRESS MANUAL THERAPY:48786} were applied to the: *** for *** minutes.     The patient received the following direct contact modalities after being cleared for contraindications: {AMB PT PROGRESS DIRECT CONTACT MODES:90509}    The patient received the following supervised modalities after being cleared for contradictions: {AMB PT SUPERVISED MODES:98856}    Written Home Exercises Provided: ***  Pt demo {GOOD FAIR POOR:50167} understanding of the education provided. Steff demonstrated {GOOD FAIR POOR:31376} return demonstration of activities.     Assessment:       Pt {AMB PT PROGRESS WILL WILL NOT:89107}.    Patient is making {GOOD FAIR POOR:58984} progress towards established goals.          Plan:  Continue with established Plan of Care towards PT goals.

## 2017-05-09 NOTE — MR AVS SNAPSHOT
[facility] [address]  [place] [address]  Phone: [phone]  Fax: [phone]                  Steff Johnson   2017 9:00 AM   Clinical Support    Description:  Female : 1960   Provider:  Jazz Hobson PTA   Department:  [facility]           Diagnoses this Visit        Comments    Pain in both knees, unspecified chronicity                To Do List           Future Appointments        Provider Department Dept Phone    2017 3:00 PM GENERAL, PT [facility] [phone]    2017 9:00 AM Jonelle Grossman PTA [facility] [phone]    2017 9:40 AM Cristina Ren MD [facility] [phone]    2017 3:00 PM Félix Isaacs, PT [facility] [phone]      Goals (5 Years of Data)     None      [facility]     [facility] Care [facility] -  Assistance  Unless otherwise directed by your provider, please contact [facility], our nurse care line that is available for  assistance.     Registered nurses in the [facility] provide: appointment scheduling, clinical advisement, health education, and other advisory services.  Call: [phone] (toll free)               Medications           Message regarding Medications     Verify the changes and/or additions to your medication regime listed below are the same as discussed with your clinician today.  If any of these changes or additions are incorrect, please notify your healthcare provider.             Verify that the below list of medications is an accurate representation of the medications you are currently taking.  If none reported, the list may be blank. If incorrect, please contact your healthcare provider. Carry this list with you in case of emergency.           Current Medications     amlodipine-valsartan (EXFORGE)  mg per tablet Take 1 tablet by mouth once daily.     "chlorthalidone (HYGROTEN) 25 MG Tab Take 1 tablet (25 mg total) by mouth once daily.    diclofenac sodium 1 % Gel Apply 2 grams topically up to 4 times daily to affected joint.    gabapentin (NEURONTIN) 300 MG capsule TK 1 C PO HS    hydrocodone-acetaminophen 10-325mg (NORCO)  mg Tab     insulin glargine (LANTUS SOLOSTAR) 100 unit/mL (3 mL) InPn pen ADMINISTER 25 UNITS UNDER THE SKIN DAILY TWICE DAILY    lidocaine-prilocaine (EMLA) cream     metformin (GLUCOPHAGE-XR) 500 MG 24 hr tablet 2 TABLETS TWICE DAILY WITH MEALS    metoprolol succinate (TOPROL-XL) 200 MG 24 hr tablet Take 1 tablet (200 mg total) by mouth once daily.    pen needle, diabetic (BD INSULIN PEN NEEDLE UF SHORT) 31 gauge x 5/16" Ndle USE EVERY DAY WITH LANTUS    tiotropium (SPIRIVA WITH HANDIHALER) 18 mcg inhalation capsule Inhale 1 capsule (18 mcg total) into the lungs once daily.    zolpidem (AMBIEN) 10 mg tablet Take 10 mg by mouth nightly as needed.             Clinical Reference Information           Allergies as of 5/9/2017     No Known Allergies      Immunizations Administered on Date of Encounter - 5/9/2017     None      Language Assistance Services     ATTENTION: Language assistance services are available, free of charge. Please call [phone].      ATENCIÓN: Si francie alba, tiene a otero disposición servicios gratuitos de asistencia lingüística. Llame al [phone].     MICHAEL Ý: N?u b?n nói Ti?ng Vi?t, có các d?ch v? h? tr? ngôn ng? mi?n phí dành cho b?n. G?i s? [phone].         [facility] complies with applicable Federal civil rights laws and does not discriminate on the basis of race, color, national origin, age, disability, or sex.        "

## 2017-05-09 NOTE — PROGRESS NOTES
"Name: Steff TIERNEY Johnson  Clinic Number: [id number]  Date of Treatment: 05/09/2017   Diagnosis:   Encounter Diagnosis   Name Primary?    Pain in both knees, unspecified chronicity        Time in: 0900  Time Out: 1000  Total Treatment Time: 60    Subjective:    Steff reports improvement of symptoms and and hasn't had pain "in a while".  Reports continues HEP daily without problems.    Objective    Patient received individual therapy to increase strength, endurance, ROM, flexibility, posture and core stabilization with activities as follows:     Steff received therapeutic exercises to develop strength, endurance, ROM, flexibility, posture and core stabilization for 60 minutes including:     Bike x 10'  Standing HR/TR airex 10/3 B in // bars  Standing gastroc stretches 3/30s B in // bars over 1/2 foam roll  Seated hamstring stretches 3/30s L/R  Seated ballsqueeze hip aDD 10/3 B  Seated clamshells B hip aBd GTB 10/3  Airex Minisquats 10/3 B in // bars with frequent cues for proper technique and weight shifting  Airex SLS L/R 3/30s L/R in // bars  DF-100 knee flexion 22# 10/3 B, extn 11# B 10/3  Shuttle B LE leg press 10/3 41#  Shuttle L/R unilateral leg press 25# 10/3  Supine QS B 10/3 with towel under knees  Supine SLR L/R 2# 10/3    Continue HEP daily.    Pt demo good understanding of the education provided. Steff demonstrated good return demonstration of activities.     Assessment:     Improving with decreased pain per pt report, increased challenges without pain per pt report. Needs review of proper techniques of minisquats and proper body mechanics for reinforcement for knee protection. R hip ER with bike but able to correct with occasional cues. Requires cues to prevent anterior weight shifting of tibias over toes during miniquats. Progressing with increased resistance with SLR and shuttle without discomfort reported.     Pt will continue to benefit from skilled PT intervention. Medical Necessity is demonstrated " by:  Requires skilled supervision to complete and progress HEP and Weakness.    Patient is making good progress towards established goals.    Plan:    Continue with established Plan of Care towards PT goals.

## 2017-05-11 ENCOUNTER — CLINICAL SUPPORT (OUTPATIENT)
Dept: REHABILITATION | Facility: HOSPITAL | Age: 57
End: 2017-05-11
Attending: ORTHOPAEDIC SURGERY
Payer: MEDICARE

## 2017-05-11 ENCOUNTER — TELEPHONE (OUTPATIENT)
Dept: FAMILY MEDICINE | Facility: CLINIC | Age: 57
End: 2017-05-11

## 2017-05-11 DIAGNOSIS — M25.562 PAIN IN BOTH KNEES, UNSPECIFIED CHRONICITY: Primary | ICD-10-CM

## 2017-05-11 DIAGNOSIS — M25.561 PAIN IN BOTH KNEES, UNSPECIFIED CHRONICITY: Primary | ICD-10-CM

## 2017-05-11 DIAGNOSIS — E11.65 TYPE 2 DIABETES MELLITUS WITH HYPERGLYCEMIA, WITH LONG-TERM CURRENT USE OF INSULIN: Primary | ICD-10-CM

## 2017-05-11 DIAGNOSIS — Z79.4 TYPE 2 DIABETES MELLITUS WITH HYPERGLYCEMIA, WITH LONG-TERM CURRENT USE OF INSULIN: Primary | ICD-10-CM

## 2017-05-11 PROCEDURE — 97110 THERAPEUTIC EXERCISES: CPT | Mod: PN

## 2017-05-11 RX ORDER — LANCETS 33 GAUGE
1 EACH MISCELLANEOUS
Qty: 200 EACH | Refills: 3 | Status: SHIPPED | OUTPATIENT
Start: 2017-05-11 | End: 2017-09-12 | Stop reason: SDUPTHER

## 2017-05-11 NOTE — TELEPHONE ENCOUNTER
----- Message from Suzanne Howard sent at 5/11/2017 10:42 AM CDT -----  Contact: [facility]  [phone]  States that they are missing the orders for the supplies but they did receive the notes.  Can you please fax the order for True Metric Meter Strips and Lancet.  Please fax [phone].  Thank you

## 2017-05-11 NOTE — TELEPHONE ENCOUNTER
Advised patient of lab results and that Dr. Ren would go into more detail at appointment. She will bring her medication and logs with her to her appointment. She also stated she needed refills on her diabetic testing supplies. [facility] is also calling for orders and visit notes, which were faxed to them.

## 2017-05-11 NOTE — TELEPHONE ENCOUNTER
Patient using True Metrix glucometer and lancets to check blood glucose AC/HS. This is covered by [facility] for now.     LOV: 4/21/17  FOV: 5/17/17

## 2017-05-11 NOTE — TELEPHONE ENCOUNTER
Office notes faxed to [facility] for refill on test stripes.   Additional Anesthesia Volume In Cc (Will Not Render If 0): 0.2

## 2017-05-11 NOTE — PROGRESS NOTES
Name: Steff Johnson  Clinic Number: [id number]  Date of Treatment: 05/11/2017   Diagnosis:   Encounter Diagnosis   Name Primary?    Pain in both knees, unspecified chronicity Yes       Time in: 1500  Time Out: 1545  Total Treatment Time: 45  Group Time: NA      Subjective:    Steff reports improvement of symptoms.  Patient reports their pain to be 0/10 on a 0-10 scale with 0 being no pain and 10 being the worst pain imaginable.    Objective    Steff received therapeutic exercises to develop strength, ROM and flexibility for 45 minutes including:     X 10 min recumbant bike (level 2)  X 15 reps seated marley. LE there ex w/ 2# wt = LAQ, AP, marching  X 15 reps seated ball squeezes  X 15 reps wall slides w/ orange theraball  X 15 reps lunges w/ 4# wt  X 15 reps supine marley. LE there ex w/ 2# wt = SLR, HS, AP, hip ABD/ADD  Pt. deferred use of ice packs due to no pain today    Assessment:     Mrs. Johnson reports no increase in symptoms from treatment session.    Pt will continue to benefit from skilled PT intervention. Medical Necessity is demonstrated by:  Unable to participate fully in daily activities, Requires skilled supervision to complete and progress HEP and Weakness.    Patient is making good progress towards established goals.    New/Revised goals: NA      Plan:  Continue with established Plan of Care towards PT goals.

## 2017-05-12 RX ORDER — DEXTROSE 4 G
1 TABLET,CHEWABLE ORAL SEE ADMIN INSTRUCTIONS
Qty: 1 EACH | Refills: 0 | Status: SHIPPED | OUTPATIENT
Start: 2017-05-12 | End: 2017-09-12 | Stop reason: SDUPTHER

## 2017-05-14 ENCOUNTER — DOCUMENTATION ONLY (OUTPATIENT)
Dept: FAMILY MEDICINE | Facility: CLINIC | Age: 57
End: 2017-05-14

## 2017-05-14 ENCOUNTER — TELEPHONE (OUTPATIENT)
Dept: FAMILY MEDICINE | Facility: CLINIC | Age: 57
End: 2017-05-14

## 2017-05-14 NOTE — PATIENT INSTRUCTIONS
Controlling High Blood Pressure  High blood pressure (hypertension) is often called the silent killer. This is because many people who have it dont know it. High blood pressure is defined as 140/90 mm Hg or higher. Know your blood pressure and remember to check it regularly. Doing so can save your life. Here are some things you can do to help control your blood pressure.    Choose heart-healthy foods  · Select low-salt, low-fat foods. Limit sodium intake to 2,400 mg per day or the amount suggested by your healthcare provider.  · Limit canned, dried, cured, packaged, and fast foods. These can contain a lot of salt.  · Eat 8 to 10 servings of fruits and vegetables every day.  · Choose lean meats, fish, or chicken.  · Eat whole-grain pasta, brown rice, and beans.  · Eat 2 to 3 servings of low-fat or fat-free dairy products.  · Ask your doctor about the DASH eating plan. This plan helps reduce blood pressure.  · When you go to a restaurant, ask that your meal be prepared with no added salt.  Maintain a healthy weight  · Ask your healthcare provider how many calories to eat a day. Then stick to that number.  · Ask your healthcare provider what weight range is healthiest for you. If you are overweight, a weight loss of only 3% to 5% of your body weight can help lower blood pressure. Generally, a good weight loss goal is to lose 10% of your body weight in a year.  · Limit snacks and sweets.  · Get regular exercise.  Get up and get active  · Choose activities you enjoy. Find ones you can do with friends or family. This includes bicycling, dancing, walking, and jogging.  · Park farther away from building entrances.  · Use stairs instead of the elevator.  · When you can, walk or bike instead of driving.  · [place] leaves, garden, or do household repairs.  · Be active at a moderate to vigorous level of physical activity for at least 40 minutes for a minimum of 3 to 4 days a week.   Manage stress  · Make time to relax and enjoy  life. Find time to laugh.  · Communicate your concerns with your loved ones and your healthcare provider.  · Visit with family and friends, and keep up with hobbies.  Limit alcohol and quit smoking  · Men should have no more than 2 drinks per day.  · Women should have no more than 1 drink per day.  · Talk with your healthcare provider about quitting smoking. Smoking significantly increases your risk for heart disease and stroke. Ask your healthcare provider about community smoking cessation programs and other options.  Medicines  If lifestyle changes arent enough, your healthcare provider may prescribe high blood pressure medicine. Take all medicines as prescribed. If you have any questions about your medicines, ask your healthcare provider before stopping or changing them.   Date Last Reviewed: 4/27/2016 © 2000-2016 [employer]. [address] [place] [address], [place] [address]. All rights reserved. This information is not intended as a substitute for professional medical care. Always follow your healthcare professional's instructions.        Diabetes (General Information)  Diabetes is a long-term health problem. It means your body does not make enough insulin. Or it may mean that your body cannot use the insulin it makes. Insulin is a hormone in your body. It lets blood sugar (glucose) reach the cells in your body. All of your cells need glucose for fuel.  When you have diabetes, the glucose in your blood builds up because it cannot get into the cells. This buildup is called high blood sugar (hyperglycemia).  Your blood sugar level depends on several things. It depends on what kind of food you eat and how much of it you eat. It also depends on how much exercise you get, and how much insulin you have in your body. Eating too much of the wrong kinds of food or not taking diabetes medicine on time can cause high blood sugar. Infections can cause high blood sugar even if you are taking medicines  correctly.  These things can also cause low blood sugar:  · Missing meals  · Not eating enough food  · Taking too much diabetes medicine  Diabetes can cause serious problems over time if you do not get treated. These problems include heart disease, stroke, kidney failure, and blindness. They also include nerve pain or loss of feeling in your legs and feet, and gangrene of the feet. By keeping your blood sugar under control you can prevent or delay these problems.  Normal blood sugar levels are 80 to 100 before a meal and less than 180 in the 1 to 2 hours after a meal.  Home care  Follow these guidelines when caring for yourself at home:  · Follow the diet your healthcare provider gives you. Take insulin or other diabetes medicine exactly as told to.  · Watch your blood sugar as you are told to. Keep a log of your results. This will help your provider change your medicines to keep your blood sugar under control.  · Try to reach your ideal weight. You may be able to cut back on or not have to take diabetes medicine if you eat the right foods and get exercise.  · Do not smoke. Smoking worsens the effects of diabetes on your circulation. You are much more likely to have a heart attack if you have diabetes and you smoke.  · Take good care of your feet. If you have lost feeling in your feet, you may not see an injury or infection. Check your feet and between your toes at least once a week.  · Wear a medical alert bracelet or necklace, or carry a card in your wallet that says you have diabetes. This will help healthcare providers give you the right care if you get very ill and cannot tell them that you have diabetes.  Sick day plan  If you get a cold, the flu, or a bacterial or viral infection, take these steps:  · Look at your diabetes sick plan and call your healthcare provider as you were told to. You may need to call your provider right away if:  ¨ Your blood sugar is above 240 while taking your diabetes  medicine  ¨ Your urine ketone levels are above normal or high  ¨ You have been vomiting more than 6 hours  ¨ You have trouble breathing or your breath ha s a fruity smell  ¨ You have a high fever  ¨ You have a fever for several days and you are not getting better  ¨ You get light-headed and are sleepier than usual  · Keep taking your diabetes pills (oral medicine) even if you have been vomiting and are feeling sick. Call your provider right away because you may need insulin to lower your blood sugar until you recover from your illness.  · Keep taking your insulin even if you have been vomiting and are feeling sick. Call your provider right away to ask if you need to change your insulin dose. This will depend on your blood sugar results.  · Check your blood sugar every 2 to 4 hours, or at least 4 times a day.  · Check your ketones often. If you are vomiting and having diarrhea, watch them more often.  · Do not skip meals. Try to eat small meals on a regular schedule. Do this even if you do not feel like eating.  · Drink water or other liquids that do not have caffeine or calories. This will keep you from getting dehydrated. If you are nauseated or vomiting, takes small sips every 5 minutes. To prevent dehydration try to drink a cup (8 ounces) of fluids every hour while you are awake.  General care  Always bring a source of fast-acting sugar with you in case you have symptoms of low blood sugar (below 70). At the first sign of low blood sugar, eat or drink 15 to 20 grams of fast-acting sugar to raise your blood sugar. Examples are:  · 3 to 4 glucose tablets. You can buy these at most drugstores.  · 4 ounces (1/2 cup) of regular (not diet) soft drinks  · 4 ounces (1/2 cup) of any fruit juice  · 8 ounces (1 cup) of milk  · 5 to 6 pieces of hard candy  · 1 tablespoon of honey  Check your blood sugar 15 minutes after treating yourself. If it is still below 70, take 15 to 20 more grams of fast-acting sugar. Test again in  15 minutes. If it returns to normal (70 or above), eat a snack or meal to keep your blood sugar in a safe range. If it stays low, call your doctor or go to an emergency room.  Follow-up care  Follow-up with your healthcare provider, or as advised. For more information about diabetes, visit the American Diabetes Association website at www.diabetes.org or call 995-003-8034.  When to seek medical advice  Call your healthcare provider right away if you have any of these symptoms of high blood sugar:  · Frequent urination  · Dizziness  · Drowsiness  · Thirst  · Headache  · Nausea or vomiting  · Abdominal pain  · Eyesight changes  · Fast breathing  · Confusion or loss of consciousness  Also call your provider right away if you have any of these signs of low blood sugar:  · Fatigue  · Headache  · Shakes  · Excess sweating  · Hunger  · Feeling anxious or restless  · Eyesight changes  · Drowsiness  · Weakness  · Confusion or loss of consciousness  Call [phone]  Call for emergency help right away if any of these occur:  · Chest pain or shortness of breath  · Dizziness or fainting  · Weakness of an arm or leg or one side of the face  · Trouble speaking or seeing   Date Last Reviewed: 6/1/2016  © 8176-1991 [employer]. [address] [place] [address], [place] [address]. All rights reserved. This information is not intended as a substitute for professional medical care. Always follow your healthcare professional's instructions.

## 2017-05-14 NOTE — PROGRESS NOTES
Pre-Visit Chart Review  For Appointment Scheduled on 05/17/2017    Health Maintenance Due   Topic Date Due    TETANUS VACCINE  01/08/1976    Eye Exam  06/01/2016    Mammogram  06/17/2017

## 2017-05-16 ENCOUNTER — CLINICAL SUPPORT (OUTPATIENT)
Dept: REHABILITATION | Facility: HOSPITAL | Age: 57
End: 2017-05-16
Attending: ORTHOPAEDIC SURGERY
Payer: MEDICARE

## 2017-05-16 DIAGNOSIS — M25.561 ACUTE PAIN OF BOTH KNEES: ICD-10-CM

## 2017-05-16 DIAGNOSIS — M25.562 ACUTE PAIN OF BOTH KNEES: ICD-10-CM

## 2017-05-16 PROCEDURE — 97150 GROUP THERAPEUTIC PROCEDURES: CPT | Mod: PN

## 2017-05-16 NOTE — PROGRESS NOTES
"Name: Steff Johnson  [facility] Number: [id number]  Date of Treatment: 05/16/2017   Diagnosis: No diagnosis found.    Time in: 0909  Time Out: 0959  Total Treatment Time: 50  Group Time: 50      Subjective:    Steff reports pain in L shoulder.  Pt reports no pain in bilateral knees.  Patient reports their pain to be 0/10 on a 0-10 scale with 0 being no pain and 10 being the worst pain imaginable. Pt reports compliance with HEP.  Pt arrived late for therapy.     Objective    Patient received group therapy to increase strength, endurance, ROM and flexibility with 1 patients with activities as follows:     Steff received therapeutic exercises to develop strength, endurance, ROM and flexibility for 50 minutes including:     Recumbent bike level 3 x 10'  Shuttle: 37# bilateral leg press 30x, 25# R/L SL press 30x  Standing bilateral gastroc stretch on 1/2 roll in // bars 3x30"  Seated HSS R/L 3x30" each  Seated ball squeeze 30x  LAQ 2# cuff weight R/L 30x each  SLR 2# cuff weight R/L 3x10 each  Clamshells with RTB 30x  Mini squats on Airex in // bars 30x  HR/TR on Airex in // bars 30x each  March on Airex in // bars 30x  SLS R/L 3x30" each      Written Home Exercises Provided: Continue HEP  Pt demo good understanding of the education provided. Steff demonstrated good return demonstration of activities.     Assessment:     Pt required chair behind her for biofeedback during squats. Pt required V/C to promote full knee extension during SLR. Cues needed throughout treatment to decrease speed. Pt tolerated exercises well and reported no increase in pain or s/s after treatment.    Pt will continue to benefit from skilled PT intervention. Medical Necessity is demonstrated by:  Unable to participate fully in daily activities, Requires skilled supervision to complete and progress HEP and Weakness.    Patient is making good progress towards established goals.      Plan:  Continue with established Plan of Care towards PT goals. "   I certify that I was present in the room directing the student in service delivery and guiding them using my skilled judgment. As the co-signing therapist I have reviewed the students documentation and am responsible for the treatment, assessment, and plan.

## 2017-05-17 ENCOUNTER — OFFICE VISIT (OUTPATIENT)
Dept: FAMILY MEDICINE | Facility: CLINIC | Age: 57
End: 2017-05-17
Payer: MEDICARE

## 2017-05-17 VITALS
SYSTOLIC BLOOD PRESSURE: 160 MMHG | HEART RATE: 65 BPM | TEMPERATURE: 98 F | BODY MASS INDEX: 28.4 KG/M2 | DIASTOLIC BLOOD PRESSURE: 92 MMHG | WEIGHT: 140.88 LBS | HEIGHT: 59 IN

## 2017-05-17 DIAGNOSIS — E11.65 TYPE 2 DIABETES MELLITUS WITH HYPERGLYCEMIA, WITH LONG-TERM CURRENT USE OF INSULIN: ICD-10-CM

## 2017-05-17 DIAGNOSIS — Z79.4 TYPE 2 DIABETES MELLITUS WITH HYPERGLYCEMIA, WITH LONG-TERM CURRENT USE OF INSULIN: ICD-10-CM

## 2017-05-17 DIAGNOSIS — E78.5 HYPERLIPIDEMIA, UNSPECIFIED HYPERLIPIDEMIA TYPE: ICD-10-CM

## 2017-05-17 DIAGNOSIS — I10 HTN (HYPERTENSION), MALIGNANT: Primary | ICD-10-CM

## 2017-05-17 DIAGNOSIS — J44.9 CHRONIC OBSTRUCTIVE PULMONARY DISEASE, UNSPECIFIED COPD TYPE: ICD-10-CM

## 2017-05-17 PROCEDURE — 1160F RVW MEDS BY RX/DR IN RCRD: CPT | Mod: S$GLB,,, | Performed by: FAMILY MEDICINE

## 2017-05-17 PROCEDURE — 3077F SYST BP >= 140 MM HG: CPT | Mod: S$GLB,,, | Performed by: FAMILY MEDICINE

## 2017-05-17 PROCEDURE — 99499 UNLISTED E&M SERVICE: CPT | Mod: S$PBB,,, | Performed by: FAMILY MEDICINE

## 2017-05-17 PROCEDURE — 4010F ACE/ARB THERAPY RXD/TAKEN: CPT | Mod: S$GLB,,, | Performed by: FAMILY MEDICINE

## 2017-05-17 PROCEDURE — 3045F PR MOST RECENT HEMOGLOBIN A1C LEVEL 7.0-9.0%: CPT | Mod: S$GLB,,, | Performed by: FAMILY MEDICINE

## 2017-05-17 PROCEDURE — 99214 OFFICE O/P EST MOD 30 MIN: CPT | Mod: S$GLB,,, | Performed by: FAMILY MEDICINE

## 2017-05-17 PROCEDURE — 99999 PR PBB SHADOW E&M-EST. PATIENT-LVL III: CPT | Mod: PBBFAC,,, | Performed by: FAMILY MEDICINE

## 2017-05-17 PROCEDURE — 3078F DIAST BP <80 MM HG: CPT | Mod: S$GLB,,, | Performed by: FAMILY MEDICINE

## 2017-05-17 RX ORDER — HYDRALAZINE HYDROCHLORIDE 50 MG/1
50 TABLET, FILM COATED ORAL 3 TIMES DAILY
Qty: 90 TABLET | Refills: 11 | Status: SHIPPED | OUTPATIENT
Start: 2017-05-17 | End: 2017-09-12 | Stop reason: SDUPTHER

## 2017-05-17 RX ORDER — PEN NEEDLE, DIABETIC 30 GX3/16"
NEEDLE, DISPOSABLE MISCELLANEOUS
Qty: 200 EACH | Refills: 3 | Status: SHIPPED | OUTPATIENT
Start: 2017-05-17 | End: 2019-05-22 | Stop reason: SDUPTHER

## 2017-05-17 RX ORDER — TIOTROPIUM BROMIDE 18 UG/1
18 CAPSULE ORAL; RESPIRATORY (INHALATION) DAILY
Qty: 90 CAPSULE | Refills: 1 | Status: SHIPPED | OUTPATIENT
Start: 2017-05-17 | End: 2019-03-26

## 2017-05-17 RX ORDER — INSULIN GLARGINE 100 [IU]/ML
INJECTION, SOLUTION SUBCUTANEOUS
Qty: 18 BOX | Refills: 3 | Status: SHIPPED | OUTPATIENT
Start: 2017-05-17 | End: 2017-12-03 | Stop reason: SDUPTHER

## 2017-05-17 NOTE — PROGRESS NOTES
Subjective:       Patient ID: Steff Johnson is a 57 y.o. female.    Chief Complaint: Follow-up    Patient Active Problem List   Diagnosis    Hypertension    COPD (chronic obstructive pulmonary disease)    Type 2 diabetes mellitus with hyperglycemia, with long-term current use of insulin    Chronic pain    Pain in both knees   Last eye exam 8/16 [facility] on [place]  Reviewed recent labs  Lab Visit on 05/08/2017   Component Date Value Ref Range Status    Cholesterol 05/08/2017 172  120 - 199 mg/dL Final    Triglycerides 05/08/2017 101  30 - 150 mg/dL Final    HDL 05/08/2017 60  40 - 75 mg/dL Final    LDL Cholesterol 05/08/2017 91.8  63.0 - 159.0 mg/dL Final    HDL/Chol Ratio 05/08/2017 34.9  20.0 - 50.0 % Final    Total Cholesterol/HDL Ratio 05/08/2017 2.9  2.0 - 5.0 Final    Non-HDL Cholesterol 05/08/2017 112  mg/dL Final    Hemoglobin A1C 05/08/2017 8.5* 4.5 - 6.2 % Final    Estimated Avg Glucose 05/08/2017 197* 68 - 131 mg/dL Final     C/o nausea in am -thinks it is due to too many meds at once.  Takes them with food.  Discussed taking some at night instead and will see if this helps    HPI  Review of Systems   Constitutional: Negative for fatigue and unexpected weight change.   Respiratory: Negative for chest tightness and shortness of breath.    Cardiovascular: Negative for chest pain, palpitations and leg swelling.   Gastrointestinal: Negative for abdominal pain.   Musculoskeletal: Negative for arthralgias.   Neurological: Negative for dizziness, syncope, light-headedness and headaches.       Objective:      Physical Exam   Constitutional: She is oriented to person, place, and time. She appears well-developed and well-nourished.   Cardiovascular: Normal rate, regular rhythm and normal heart sounds.    Pulmonary/Chest: Effort normal and breath sounds normal.   Musculoskeletal: She exhibits no edema.   Neurological: She is alert and oriented to person, place, and time.   Skin: Skin  "is warm and dry.   Psychiatric: She has a normal mood and affect.   Nursing note and vitals reviewed.      Assessment:       1. HTN (hypertension), malignant    2. Type 2 diabetes mellitus with hyperglycemia, with long-term current use of insulin    3. Chronic obstructive pulmonary disease, unspecified COPD type    4. Hyperlipidemia, unspecified hyperlipidemia type        Plan:       1. HTN (hypertension), malignant  Uncontrolled and refractory to maxed out 4 meds.  Add:  - hydrALAZINE (APRESOLINE) 50 MG tablet; Take 1 tablet (50 mg total) by mouth 3 (three) times daily.  Dispense: 90 tablet; Refill: 11  Refer:  - Ambulatory referral to Nephrology    2. Type 2 diabetes mellitus with hyperglycemia, with long-term current use of insulin  Uncontrolled but improving.  Increase to:  - insulin glargine (LANTUS SOLOSTAR) 100 unit/mL (3 mL) InPn pen; 30 u sq bid  Dispense: 18 Box; Refill: 3  - pen needle, diabetic (PEN NEEDLE) 30 gauge x 5/16" Ndle; Inject 2 x /day  Dispense: 200 each; Refill: 3  - Comprehensive metabolic panel; Future  - Hemoglobin A1c; Future  - Microalbumin/creatinine urine ratio; Future    3. Chronic obstructive pulmonary disease, unspecified COPD type  Controlled on current medications.  Continue current medications.    - tiotropium (SPIRIVA WITH HANDIHALER) 18 mcg inhalation capsule; Inhale 1 capsule (18 mcg total) into the lungs once daily.  Dispense: 90 capsule; Refill: 1    4. Hyperlipidemia, unspecified hyperlipidemia type  Controlled on current medications.  Continue current medications.    - Lipid panel; Future  - CBC auto differential; Future    [facility]  Patient readiness: acceptance and barriers:readiness  During the course of the visit the patient was educated and counseled about the following: Diabetes:  Discussed general issues about diabetes pathophysiology and management.  Hypertension:   Dietary sodium restriction.  Goals: Diabetes: Maintain Hemoglobin A1C below 7 and " Hypertension: Reduce Blood Pressure  Goal/Outcomes met:none  The following self management tools provided:none  Patient Instructions (the written plan) was given to the patient/family: Yes  Time spent with patient: 20 minutes    Patient with be reevaluated in 3 months or sooner prn . 4 weeks nurse Bp check    Greater than 50% of this visit was spent counseling as described in above documentation:Yes

## 2017-05-17 NOTE — MR AVS SNAPSHOT
"    [facility]  [address]  [place] [address]  Phone: [phone]  Fax: [phone]                  Steff Johnson   2017 9:40 AM   Office Visit    Description:  Female : 1960   Provider:  Cristina Ren MD   Department:  [facility]           Reason for Visit     Follow-up           Diagnoses this Visit        Comments    HTN (hypertension), malignant    -  Primary     Type 2 diabetes mellitus with hyperglycemia, with long-term current use of insulin         Chronic obstructive pulmonary disease, unspecified COPD type         Hyperlipidemia, unspecified hyperlipidemia type                To Do List           Future Appointments        Provider Department Dept Phone    2017 3:00 PM Félix Isaacs, PT [facility] [phone]    2017 9:40 AM Garret Echols MD [facility] Nephrology [phone]    2017 10:20 AM Cristina Ren MD [facility] [phone]    8/15/2017 8:15 AM LAB, [place] [facility] - Lab [phone]    [facility]2017 10[facility]00 [facility] [phone]      Goals (5 Years of Data)     None      Follow-Up and Disposition     Return in about 3 months (around 2017).       These Medications        Disp Refills Start End    hydrALAZINE (APRESOLINE) 50 MG tablet 90 tablet 11 2017    Take 1 tablet (50 mg total) by mouth 3 (three) times daily. - Oral    Pharmacy: [facility] [address] [place] [address] [address] [facility] Ph #: [phone]       insulin glargine (LANTUS SOLOSTAR) 100 unit/mL (3 mL) InPn pen 18 Box 3 2017     30 u sq bid    Pharmacy: [facility] [address] [place] [address] [address] [facility] Ph #: [phone]       pen needle, diabetic (PEN NEEDLE) 30 gauge x 5/16" Ndle 200 each 3 2017     Inject 2 x /day    Pharmacy: [facility] [id number] [facility] - " "[address] [facility] Ph #: [phone]       tiotropium (SPIRIVA WITH HANDIHALER) 18 mcg inhalation capsule 90 capsule 1 2017     Inhale 1 capsule (18 mcg total) into the lungs once daily. - Inhalation    Pharmacy: [facility] [id number] [place] [address] [address] [facility] Ph #: [phone]         [facility] On Call     [facility] On Call Nurse Care Line -  Assistance  Unless otherwise directed by your provider, please contact [facility], our nurse care line that is available for  assistance.     Registered nurses in the [facility] provide: appointment scheduling, clinical advisement, health education, and other advisory services.  Call: [phone] (toll free)               Medications           Message regarding Medications     Verify the changes and/or additions to your medication regime listed below are the same as discussed with your clinician today.  If any of these changes or additions are incorrect, please notify your healthcare provider.        START taking these NEW medications        Refills    hydrALAZINE (APRESOLINE) 50 MG tablet 11    Sig: Take 1 tablet (50 mg total) by mouth 3 (three) times daily.    Class: Normal    Route: Oral    insulin glargine (LANTUS SOLOSTAR) 100 unit/mL (3 mL) InPn pen 3    Si u sq bid    Class: Normal    pen needle, diabetic (PEN NEEDLE) 30 gauge x 5/16" Ndle 3    Sig: Inject 2 x /day    Class: Normal      STOP taking these medications     pen needle, diabetic (BD INSULIN PEN NEEDLE UF SHORT) 31 gauge x 5/16" Ndle USE EVERY DAY WITH LANTUS           Verify that the below list of medications is an accurate representation of the medications you are currently taking.  If none reported, the list may be blank. If incorrect, please contact your healthcare provider. Carry this list with you in case of emergency.           Current Medications     amlodipine-valsartan (EXFORGE)  mg per tablet " "Take 1 tablet by mouth once daily.    blood sugar diagnostic Strp 1 strip four times daily before meals and bedtime. True Metrix or equivalent covered by insurance. Diagnosis: E11.65    blood-glucose meter (TRUE METRIX GLUCOSE METER) Misc 1 each by Misc.(Non-Drug; Combo Route) route As instructed (Diagnosis E11.65).    chlorthalidone (HYGROTEN) 25 MG Tab Take 1 tablet (25 mg total) by mouth once daily.    diclofenac sodium 1 % Gel Apply 2 grams topically up to 4 times daily to affected joint.    gabapentin (NEURONTIN) 300 MG capsule TK 1 C PO HS    hydrocodone-acetaminophen 10-325mg (NORCO)  mg Tab     lancets 33 gauge Misc 1 lancet by Misc.(Non-Drug; Combo Route) route 4 (four) times daily before meals and nightly. For True Metrix system. Diagnosis: E11.65    lidocaine-prilocaine (EMLA) cream     metformin (GLUCOPHAGE-XR) 500 MG 24 hr tablet 2 TABLETS TWICE DAILY WITH MEALS    metoprolol succinate (TOPROL-XL) 200 MG 24 hr tablet Take 1 tablet (200 mg total) by mouth once daily.    tiotropium (SPIRIVA WITH HANDIHALER) 18 mcg inhalation capsule Inhale 1 capsule (18 mcg total) into the lungs once daily.    zolpidem (AMBIEN) 10 mg tablet Take 10 mg by mouth nightly as needed.      hydrALAZINE (APRESOLINE) 50 MG tablet Take 1 tablet (50 mg total) by mouth 3 (three) times daily.    insulin glargine (LANTUS SOLOSTAR) 100 unit/mL (3 mL) InPn pen 30 u sq bid    pen needle, diabetic (PEN NEEDLE) 30 gauge x 5/16" Ndle Inject 2 x /day           Clinical Reference Information           Your Vitals Were     BP Pulse Temp Height Weight BMI    160/92 (BP Location: Right arm, Patient Position: Sitting, BP Method: Manual) 65 98.1 °F (36.7 °C) (Oral) 4' 11" (1.499 m) 63.9 kg (140 lb 14 oz) 28.45 kg/m2      Blood Pressure          Most Recent Value    BP  (!)  160/92      Allergies as of 5/17/2017     No Known Allergies      Immunizations Administered on Date of Encounter - 5/17/2017     None      Orders Placed During Today's " Visit      Normal Orders This Visit    Ambulatory referral to Nephrology     Future Labs/Procedures Expected by Expires    CBC auto differential  8/17/2017 11/17/2017    Comprehensive metabolic panel  8/17/2017 11/17/2017    Hemoglobin A1c  8/17/2017 11/17/2017    Lipid panel  8/17/2017 11/17/2017    Microalbumin/creatinine urine ratio  8/17/2017 11/17/2017      Instructions      Controlling High Blood Pressure  High blood pressure (hypertension) is often called the silent killer. This is because many people who have it dont know it. High blood pressure is defined as 140/90 mm Hg or higher. Know your blood pressure and remember to check it regularly. Doing so can save your life. Here are some things you can do to help control your blood pressure.    Choose heart-healthy foods  · Select low-salt, low-fat foods. Limit sodium intake to 2,400 mg per day or the amount suggested by your healthcare provider.  · Limit canned, dried, cured, packaged, and fast foods. These can contain a lot of salt.  · Eat 8 to 10 servings of fruits and vegetables every day.  · Choose lean meats, fish, or chicken.  · Eat whole-grain pasta, brown rice, and beans.  · Eat 2 to 3 servings of low-fat or fat-free dairy products.  · Ask your doctor about the DASH eating plan. This plan helps reduce blood pressure.  · When you go to a restaurant, ask that your meal be prepared with no added salt.  Maintain a healthy weight  · Ask your healthcare provider how many calories to eat a day. Then stick to that number.  · Ask your healthcare provider what weight range is healthiest for you. If you are overweight, a weight loss of only 3% to 5% of your body weight can help lower blood pressure. Generally, a good weight loss goal is to lose 10% of your body weight in a year.  · Limit snacks and sweets.  · Get regular exercise.  Get up and get active  · Choose activities you enjoy. Find ones you can do with friends or family. This includes bicycling, dancing,  walking, and jogging.  · Park farther away from building entrances.  · Use stairs instead of the elevator.  · When you can, walk or bike instead of driving.  · [place] leaves, garden, or do household repairs.  · Be active at a moderate to vigorous level of physical activity for at least 40 minutes for a minimum of 3 to 4 days a week.   Manage stress  · Make time to relax and enjoy life. Find time to laugh.  · Communicate your concerns with your loved ones and your healthcare provider.  · Visit with family and friends, and keep up with hobbies.  Limit alcohol and quit smoking  · Men should have no more than 2 drinks per day.  · Women should have no more than 1 drink per day.  · Talk with your healthcare provider about quitting smoking. Smoking significantly increases your risk for heart disease and stroke. Ask your healthcare provider about community smoking cessation programs and other options.  Medicines  If lifestyle changes arent enough, your healthcare provider may prescribe high blood pressure medicine. Take all medicines as prescribed. If you have any questions about your medicines, ask your healthcare provider before stopping or changing them.   Date Last Reviewed: 4/27/2016  © 1562-8143 [employer] [address] [place] [address]. All rights reserved. This information is not intended as a substitute for professional medical care. Always follow your healthcare professional's instructions.        Diabetes (General Information)  Diabetes is a long-term health problem. It means your body does not make enough insulin. Or it may mean that your body cannot use the insulin it makes. Insulin is a hormone in your body. It lets blood sugar (glucose) reach the cells in your body. All of your cells need glucose for fuel.  When you have diabetes, the glucose in your blood builds up because it cannot get into the cells. This buildup is called high blood sugar (hyperglycemia).  Your blood sugar level  depends on several things. It depends on what kind of food you eat and how much of it you eat. It also depends on how much exercise you get, and how much insulin you have in your body. Eating too much of the wrong kinds of food or not taking diabetes medicine on time can cause high blood sugar. Infections can cause high blood sugar even if you are taking medicines correctly.  These things can also cause low blood sugar:  · Missing meals  · Not eating enough food  · Taking too much diabetes medicine  Diabetes can cause serious problems over time if you do not get treated. These problems include heart disease, stroke, kidney failure, and blindness. They also include nerve pain or loss of feeling in your legs and feet, and gangrene of the feet. By keeping your blood sugar under control you can prevent or delay these problems.  Normal blood sugar levels are 80 to 100 before a meal and less than 180 in the 1 to 2 hours after a meal.  Home care  Follow these guidelines when caring for yourself at home:  · Follow the diet your healthcare provider gives you. Take insulin or other diabetes medicine exactly as told to.  · Watch your blood sugar as you are told to. Keep a log of your results. This will help your provider change your medicines to keep your blood sugar under control.  · Try to reach your ideal weight. You may be able to cut back on or not have to take diabetes medicine if you eat the right foods and get exercise.  · Do not smoke. Smoking worsens the effects of diabetes on your circulation. You are much more likely to have a heart attack if you have diabetes and you smoke.  · Take good care of your feet. If you have lost feeling in your feet, you may not see an injury or infection. Check your feet and between your toes at least once a week.  · Wear a medical alert bracelet or necklace, or carry a card in your wallet that says you have diabetes. This will help healthcare providers give you the right care if you  get very ill and cannot tell them that you have diabetes.  Sick day plan  If you get a cold, the flu, or a bacterial or viral infection, take these steps:  · Look at your diabetes sick plan and call your healthcare provider as you were told to. You may need to call your provider right away if:  ¨ Your blood sugar is above 240 while taking your diabetes medicine  ¨ Your urine ketone levels are above normal or high  ¨ You have been vomiting more than 6 hours  ¨ You have trouble breathing or your breath ha s a fruity smell  ¨ You have a high fever  ¨ You have a fever for several days and you are not getting better  ¨ You get light-headed and are sleepier than usual  · Keep taking your diabetes pills (oral medicine) even if you have been vomiting and are feeling sick. Call your provider right away because you may need insulin to lower your blood sugar until you recover from your illness.  · Keep taking your insulin even if you have been vomiting and are feeling sick. Call your provider right away to ask if you need to change your insulin dose. This will depend on your blood sugar results.  · Check your blood sugar every 2 to 4 hours, or at least 4 times a day.  · Check your ketones often. If you are vomiting and having diarrhea, watch them more often.  · Do not skip meals. Try to eat small meals on a regular schedule. Do this even if you do not feel like eating.  · Drink water or other liquids that do not have caffeine or calories. This will keep you from getting dehydrated. If you are nauseated or vomiting, takes small sips every 5 minutes. To prevent dehydration try to drink a cup (8 ounces) of fluids every hour while you are awake.  General care  Always bring a source of fast-acting sugar with you in case you have symptoms of low blood sugar (below 70). At the first sign of low blood sugar, eat or drink 15 to 20 grams of fast-acting sugar to raise your blood sugar. Examples are:  · 3 to 4 glucose tablets. You can buy  these at most [facility].  · 4 ounces (1/2 cup) of regular (not diet) soft drinks  · 4 ounces (1/2 cup) of any fruit juice  · 8 ounces (1 cup) of milk  · 5 to 6 pieces of hard candy  · 1 tablespoon of honey  Check your blood sugar 15 minutes after treating yourself. If it is still below 70, take 15 to 20 more grams of fast-acting sugar. Test again in 15 minutes. If it returns to normal (70 or above), eat a snack or meal to keep your blood sugar in a safe range. If it stays low, call your doctor or go to an emergency room.  Follow-up care  Follow-up with your healthcare provider, or as advised. For more information about diabetes, visit the American Diabetes Association website at www.diabetes.org or call 519-981-3038.  When to seek medical advice  Call your healthcare provider right away if you have any of these symptoms of high blood sugar:  · Frequent urination  · Dizziness  · Drowsiness  · Thirst  · Headache  · Nausea or vomiting  · Abdominal pain  · Eyesight changes  · Fast breathing  · Confusion or loss of consciousness  Also call your provider right away if you have any of these signs of low blood sugar:  · Fatigue  · Headache  · Shakes  · Excess sweating  · Hunger  · Feeling anxious or restless  · Eyesight changes  · Drowsiness  · Weakness  · Confusion or loss of consciousness  Call [phone]  Call for emergency help right away if any of these occur:  · Chest pain or shortness of breath  · Dizziness or fainting  · Weakness of an arm or leg or one side of the face  · Trouble speaking or seeing   Date Last Reviewed: 6/1/2016 © 2000-2016 [employer]. [address] [place] [address], [place] [address]. All rights reserved. This information is not intended as a substitute for professional medical care. Always follow your healthcare professional's instructions.             Language Assistance Services     ATTENTION: Language assistance services are available, free of charge. Please call [phone].       ATENCIÓN: Si habla español, tiene a otero disposición servicios gratuitos de asistencia lingüística. Zoya al [phone].     CHÚ Ý: N?u b?n nói Ti?ng Vi?t, có các d?ch v? h? tr? ngôn ng? mi?n phí dành cho b?n. G?i s? [phone].         [facility] complies with applicable Federal civil rights laws and does not discriminate on the basis of race, color, national origin, age, disability, or sex.

## 2017-05-18 ENCOUNTER — CLINICAL SUPPORT (OUTPATIENT)
Dept: REHABILITATION | Facility: HOSPITAL | Age: 57
End: 2017-05-18
Attending: ORTHOPAEDIC SURGERY
Payer: MEDICARE

## 2017-05-18 DIAGNOSIS — M25.561 PAIN IN BOTH KNEES, UNSPECIFIED CHRONICITY: Primary | ICD-10-CM

## 2017-05-18 DIAGNOSIS — M25.562 PAIN IN BOTH KNEES, UNSPECIFIED CHRONICITY: Primary | ICD-10-CM

## 2017-05-18 PROCEDURE — 97110 THERAPEUTIC EXERCISES: CPT | Mod: PN

## 2017-05-18 PROCEDURE — G8980 MOBILITY D/C STATUS: HCPCS | Mod: CI,PN

## 2017-05-18 NOTE — PROGRESS NOTES
Name: Steff Johnson   Clinic Number: [id number]   Age: 57 y.o.   Diagnosis:   Encounter Diagnosis   Name Primary?    Pain in both knees, unspecified chronicity Yes      Physician: Messi Molina,*   Original Orders : PT eval and treat  Initial visit: 04/05/17  Date of Last visit: 05/18/17  Date of Discharge Note:  05/18/17  Total Visits Received: 11  Missed Visits: 3    Subjective: reports has no pain in her knees - feels like her mobility has dramatically improved    Objective:  Treatment :    Included:Therapeutic exercise, AROM, Moist heat and Stretching      ROM:  WFL    UE Strength: NT   LE Strength: 4/5 marley. knees    Treatment today:    Time In: 1545  Time Out: 1600         Assessment:  Pt. has been able to demo 2 sets of there ex and is independent w/ her HEP.  She verbalizes no pain in her knees and LEFS score has improved from 45/80 to 73/80.  Her marley. knee MMT is 4/5.    Goals Achieved: all goals met  Goals Not achieved and why: NA    Discharge reason : Met goals    Discharge plan : d/c PT services    Plan:  This patient is discharged from Physical Therapy Services.

## 2017-05-18 NOTE — PROGRESS NOTES
Name: Steff Johnson  [facility] Number: [id number]  Date of Treatment: 05/18/2017   Diagnosis:   Encounter Diagnosis   Name Primary?    Pain in both knees, unspecified chronicity Yes       Time in: 1500  Time Out: 1545  Total Treatment Time: 45  Group Time: NA      Subjective:    Steff reports improvement of symptoms.  Patient reports their pain to be 0/10 on a 0-10 scale with 0 being no pain and 10 being the worst pain imaginable.  Patient seen for POC update.    Objective    Steff received therapeutic exercises to develop strength and endurance for 45 minutes including:     X 10 min recumbant bike (level 2)  2 x 15 reps supine marley. LE there ex w/ 2# wt = SLR, HS, AP, hip ABD/ADD  2 x 15 reps shuttle mini squats and calf raises (50#)  Reviewed HEP w/ pt. - instructed to perform qd  LEFS score = 73/80    Assessment:     Pt will not continue to benefit from skilled PT intervention - no further need for PT services.    Patient has made good progress towards established goals.    Pt. understood importance of cont. to perform HEP on her own to maintain therapeutic gains.    New/Revised goals: NA - all goals met      Plan:    Discharge PT services.

## 2017-05-23 ENCOUNTER — OFFICE VISIT (OUTPATIENT)
Dept: NEPHROLOGY | Facility: CLINIC | Age: 57
End: 2017-05-23
Payer: MEDICARE

## 2017-05-23 VITALS
BODY MASS INDEX: 28.22 KG/M2 | OXYGEN SATURATION: 100 % | WEIGHT: 140 LBS | HEIGHT: 59 IN | TEMPERATURE: 98 F | HEART RATE: 65 BPM

## 2017-05-23 DIAGNOSIS — Z79.4 TYPE 2 DIABETES MELLITUS WITH HYPERGLYCEMIA, WITH LONG-TERM CURRENT USE OF INSULIN: ICD-10-CM

## 2017-05-23 DIAGNOSIS — I10 ESSENTIAL HYPERTENSION: ICD-10-CM

## 2017-05-23 DIAGNOSIS — E11.65 TYPE 2 DIABETES MELLITUS WITH HYPERGLYCEMIA, WITH LONG-TERM CURRENT USE OF INSULIN: ICD-10-CM

## 2017-05-23 DIAGNOSIS — R80.1 PERSISTENT PROTEINURIA: Primary | ICD-10-CM

## 2017-05-23 PROBLEM — R80.9 PROTEINURIA: Status: ACTIVE | Noted: 2017-05-23

## 2017-05-23 PROCEDURE — 3066F NEPHROPATHY DOC TX: CPT | Mod: S$GLB,,, | Performed by: INTERNAL MEDICINE

## 2017-05-23 PROCEDURE — 3045F PR MOST RECENT HEMOGLOBIN A1C LEVEL 7.0-9.0%: CPT | Mod: S$GLB,,, | Performed by: INTERNAL MEDICINE

## 2017-05-23 PROCEDURE — 99203 OFFICE O/P NEW LOW 30 MIN: CPT | Mod: S$GLB,,, | Performed by: INTERNAL MEDICINE

## 2017-05-23 PROCEDURE — 1160F RVW MEDS BY RX/DR IN RCRD: CPT | Mod: S$GLB,,, | Performed by: INTERNAL MEDICINE

## 2017-05-23 PROCEDURE — 99999 PR PBB SHADOW E&M-EST. PATIENT-LVL IV: CPT | Mod: PBBFAC,,, | Performed by: INTERNAL MEDICINE

## 2017-05-23 PROCEDURE — 99499 UNLISTED E&M SERVICE: CPT | Mod: S$PBB,,, | Performed by: INTERNAL MEDICINE

## 2017-05-23 NOTE — LETTER
May 23, 2017      Cristina Ren MD  [address] [facility] [address]           [facility]ph[facility]  [address] [facility] [address] [place] [address]  Phone: [phone]          Patient: Steff Johnson   MR Number: [id number]   YOB: 1960   Date of Visit: 5/23/2017       Dear Dr. Cristina Ren:    Thank you for referring Steff Johnson to me for evaluation. Attached you will find relevant portions of my assessment and plan of care.    If you have questions, please do not hesitate to call me. I look forward to following Steff Johnson along with you.    Sincerely,    Garret Echols MD    Enclosure  CC:  No Recipients    If you would like to receive this communication electronically, please contact [email or web address] or [phone] to request more information on [employer] Link access.    For providers and/or their staff who would like to refer a patient to [facility], please contact us through our one-stop-shop provider referral line, [facility] , at [phone].    If you feel you have received this communication in error or would no longer like to receive these types of communications, please e-mail [email or web address]

## 2017-05-23 NOTE — PROGRESS NOTES
Subjective:       Patient ID: Steff Johnson is a 57 y.o. Black or  female who presents for new evaluation of Proteinuria    HPI     She is referred by her PCP for proteinuria.     She has a significant history of DM and HTN, last Hba1c was 8.5 which improved from 11 previously. She denies foamy urine, no hematuria and no frequent UTIs. She follows a low sodium diet but admits she does not ensure hydration (dislikes water) Occasional NSAID use but no herbal medications. She was a premature infant. She received PRBCs before 1972. No known kidney disease in her family    Review of Systems   Constitutional: Negative for activity change, appetite change, fatigue and unexpected weight change.   HENT: Negative for facial swelling.    Respiratory: Negative for shortness of breath.    Cardiovascular: Negative for chest pain and leg swelling.   Gastrointestinal: Positive for constipation. Negative for diarrhea.   Genitourinary: Negative for difficulty urinating, dysuria, flank pain and hematuria.   Musculoskeletal: Positive for arthralgias and back pain.   Neurological: Negative for weakness and headaches.       Objective:      Physical Exam   Constitutional: She is oriented to person, place, and time. She appears well-nourished. No distress.   HENT:   Mouth/Throat: Oropharynx is clear and moist.   Neck: No JVD present.   Cardiovascular: Regular rhythm, S1 normal and S2 normal.  Exam reveals no friction rub.    Pulmonary/Chest: Breath sounds normal. She has no wheezes. She has no rales.   Abdominal: Soft.   Musculoskeletal: She exhibits no edema.   Neurological: She is alert and oriented to person, place, and time.   Skin: Skin is warm and dry.   Psychiatric: She has a normal mood and affect.   Vitals reviewed.      Assessment:       1. Persistent proteinuria    2. Essential hypertension    3. Type 2 diabetes mellitus with hyperglycemia, with long-term current use of insulin        Plan:             DM  nephropathy. She is already maintained on an ARB. Will trend her proteinuria and if significantly worsens will add aldactone with close monitoring of her potassium.      RTC 6 months with labs prior

## 2017-06-16 ENCOUNTER — TELEPHONE (OUTPATIENT)
Dept: FAMILY MEDICINE | Facility: CLINIC | Age: 57
End: 2017-06-16

## 2017-06-16 ENCOUNTER — CLINICAL SUPPORT (OUTPATIENT)
Dept: FAMILY MEDICINE | Facility: CLINIC | Age: 57
End: 2017-06-16
Payer: MEDICARE

## 2017-06-16 VITALS — SYSTOLIC BLOOD PRESSURE: 172 MMHG | HEART RATE: 74 BPM | DIASTOLIC BLOOD PRESSURE: 98 MMHG

## 2017-06-16 DIAGNOSIS — I10 HTN (HYPERTENSION), MALIGNANT: Primary | ICD-10-CM

## 2017-06-16 DIAGNOSIS — G89.29 OTHER CHRONIC PAIN: Primary | ICD-10-CM

## 2017-06-16 PROCEDURE — 99999 PR PBB SHADOW E&M-EST. PATIENT-LVL II: CPT | Mod: PBBFAC,,, | Performed by: FAMILY MEDICINE

## 2017-06-16 NOTE — TELEPHONE ENCOUNTER
Spoke with patient she state that she is taking all BP medication as prescribed, but she think her Bp is high because of her shoulder pain. Please advise.

## 2017-06-16 NOTE — NURSING
2 patient identifiers used (name & ). Patient came in for nurse blood pressure check. Automatic reading was 160/92 P- 74. Right arm resting on desk, feet flat on floor, back straight. Patient has c/o pain 8/10 to L shoulder. Patient stated that they took their prescribed blood pressure medication this am. Allergies and medications reviewed with the patient. Blood pressure re-checked after 5 minutes with manual cuff, reading was 172/98 . Patient advised to continue taking medications as prescribed and follow up as scheduled. Patient advised that message will be sent to the provider for recommendations and we will call with the reply. Confirmed good number is [phone].  Patient believes pressure is high due to her shoulder pain.

## 2017-06-16 NOTE — PROGRESS NOTES
2 patient identifiers used (name & ). Patient came in for nurse blood pressure check. Automatic reading was 160/92 P- 74. Right arm resting on desk, feet flat on floor, back straight. Patient has c/o pain 8/10 to L shoulder. Patient stated that they took their prescribed blood pressure medication this am. Allergies and medications reviewed with the patient. Blood pressure re-checked after 5 minutes with manual cuff, reading was 172/98 . Patient advised to continue taking medications as prescribed and follow up as scheduled. Patient advised that message will be sent to the provider for recommendations and we will call with the reply. Confirmed good number is [phone].  Patient believes pressure is high due to her shoulder pain.

## 2017-06-16 NOTE — TELEPHONE ENCOUNTER
Automatic reading was 160/92 P- 74. Right arm resting on desk, feet flat on floor, back straight. Patient has c/o pain 8/10 to L shoulder. Patient stated that they took their prescribed blood pressure medication this am. Blood pressure re-checked after 5 minutes with manual cuff, reading was 172/98 . Patient advised to continue taking medications as prescribed and follow up as scheduled. Patient advised that message will be sent to the provider for recommendations and we will call with the reply. Confirmed good number is [phone].  Patient believes pressure is high due to her shoulder pain.

## 2017-06-16 NOTE — TELEPHONE ENCOUNTER
Patient is max on 5 BP meds and still not at goal.  Please be sure patient is taking ewxforge 10/320, chlorthalidone 25mg , hydralazine 50mg tid, and toprol XL 200mg daily as prescribed.  Needs referral to nephrology.  Will order

## 2017-06-19 NOTE — TELEPHONE ENCOUNTER
Spoke with patient about scheduling an appointment to see Nephro for her HTN patient stated she already seen Nephrology and was told she is doing fine. Patient also stated that she have an appointment on 6/21/17 with pain clinic.

## 2017-06-22 ENCOUNTER — TELEPHONE (OUTPATIENT)
Dept: FAMILY MEDICINE | Facility: CLINIC | Age: 57
End: 2017-06-22

## 2017-06-22 NOTE — TELEPHONE ENCOUNTER
Advised patient that her last HgbA1c was 8.5. She said she has an eye doctor appointment and they always ask.

## 2017-06-22 NOTE — TELEPHONE ENCOUNTER
----- Message from Jaelyn Rabago sent at 6/22/2017 10:22 AM CDT -----  Patient is requesting to know what her last A1C reading was, contact patient at [phone].    Thank you

## 2017-08-15 ENCOUNTER — LAB VISIT (OUTPATIENT)
Dept: LAB | Facility: HOSPITAL | Age: 57
End: 2017-08-15
Attending: FAMILY MEDICINE
Payer: MEDICARE

## 2017-08-15 DIAGNOSIS — E78.5 HYPERLIPIDEMIA, UNSPECIFIED HYPERLIPIDEMIA TYPE: ICD-10-CM

## 2017-08-15 DIAGNOSIS — Z79.4 TYPE 2 DIABETES MELLITUS WITH HYPERGLYCEMIA, WITH LONG-TERM CURRENT USE OF INSULIN: ICD-10-CM

## 2017-08-15 DIAGNOSIS — E11.65 TYPE 2 DIABETES MELLITUS WITH HYPERGLYCEMIA, WITH LONG-TERM CURRENT USE OF INSULIN: ICD-10-CM

## 2017-08-15 LAB
ALBUMIN SERPL BCP-MCNC: 3.5 G/DL
ALP SERPL-CCNC: 90 U/L
ALT SERPL W/O P-5'-P-CCNC: 22 U/L
ANION GAP SERPL CALC-SCNC: 9 MMOL/L
AST SERPL-CCNC: 23 U/L
BASOPHILS # BLD AUTO: 0.03 K/UL
BASOPHILS NFR BLD: 0.3 %
BILIRUB SERPL-MCNC: 0.7 MG/DL
BUN SERPL-MCNC: 17 MG/DL
CALCIUM SERPL-MCNC: 9.6 MG/DL
CHLORIDE SERPL-SCNC: 109 MMOL/L
CHOLEST/HDLC SERPL: 2.8 {RATIO}
CO2 SERPL-SCNC: 23 MMOL/L
CREAT SERPL-MCNC: 1 MG/DL
DIFFERENTIAL METHOD: ABNORMAL
EOSINOPHIL # BLD AUTO: 0.2 K/UL
EOSINOPHIL NFR BLD: 1.8 %
ERYTHROCYTE [DISTWIDTH] IN BLOOD BY AUTOMATED COUNT: 14.3 %
EST. GFR  (AFRICAN AMERICAN): >60 ML/MIN/1.73 M^2
EST. GFR  (NON AFRICAN AMERICAN): >60 ML/MIN/1.73 M^2
GLUCOSE SERPL-MCNC: 141 MG/DL
HCT VFR BLD AUTO: 33.7 %
HDL/CHOLESTEROL RATIO: 35.3 %
HDLC SERPL-MCNC: 184 MG/DL
HDLC SERPL-MCNC: 65 MG/DL
HGB BLD-MCNC: 12.3 G/DL
LDLC SERPL CALC-MCNC: 103.8 MG/DL
LYMPHOCYTES # BLD AUTO: 3.2 K/UL
LYMPHOCYTES NFR BLD: 31.6 %
MCH RBC QN AUTO: 27.5 PG
MCHC RBC AUTO-ENTMCNC: 36.5 G/DL
MCV RBC AUTO: 75 FL
MONOCYTES # BLD AUTO: 0.7 K/UL
MONOCYTES NFR BLD: 6.8 %
NEUTROPHILS # BLD AUTO: 5.9 K/UL
NEUTROPHILS NFR BLD: 59.5 %
NONHDLC SERPL-MCNC: 119 MG/DL
PLATELET # BLD AUTO: 406 K/UL
PMV BLD AUTO: 10 FL
POTASSIUM SERPL-SCNC: 3.9 MMOL/L
PROT SERPL-MCNC: 7.7 G/DL
RBC # BLD AUTO: 4.47 M/UL
SODIUM SERPL-SCNC: 141 MMOL/L
TRIGL SERPL-MCNC: 76 MG/DL
WBC # BLD AUTO: 10.09 K/UL

## 2017-08-15 PROCEDURE — 80053 COMPREHEN METABOLIC PANEL: CPT

## 2017-08-15 PROCEDURE — 83036 HEMOGLOBIN GLYCOSYLATED A1C: CPT

## 2017-08-15 PROCEDURE — 80061 LIPID PANEL: CPT

## 2017-08-15 PROCEDURE — 36415 COLL VENOUS BLD VENIPUNCTURE: CPT | Mod: PO

## 2017-08-15 PROCEDURE — 85025 COMPLETE CBC W/AUTO DIFF WBC: CPT

## 2017-08-16 LAB
ESTIMATED AVG GLUCOSE: 212 MG/DL
HBA1C MFR BLD HPLC: 9 %

## 2017-08-17 ENCOUNTER — DOCUMENTATION ONLY (OUTPATIENT)
Dept: FAMILY MEDICINE | Facility: CLINIC | Age: 57
End: 2017-08-17

## 2017-08-17 NOTE — PROGRESS NOTES
Pre-Visit Chart Review  For Appointment Scheduled on 8-18-17    Health Maintenance Due   Topic Date Due    TETANUS VACCINE  01/08/1976    Influenza Vaccine  08/01/2017    Eye Exam  08/01/2017    Mammogram  09/25/2017

## 2017-09-12 ENCOUNTER — OFFICE VISIT (OUTPATIENT)
Dept: FAMILY MEDICINE | Facility: CLINIC | Age: 57
End: 2017-09-12
Payer: MEDICARE

## 2017-09-12 ENCOUNTER — TELEPHONE (OUTPATIENT)
Dept: FAMILY MEDICINE | Facility: CLINIC | Age: 57
End: 2017-09-12

## 2017-09-12 ENCOUNTER — DOCUMENTATION ONLY (OUTPATIENT)
Dept: FAMILY MEDICINE | Facility: CLINIC | Age: 57
End: 2017-09-12

## 2017-09-12 VITALS
BODY MASS INDEX: 28.4 KG/M2 | TEMPERATURE: 98 F | WEIGHT: 140.88 LBS | DIASTOLIC BLOOD PRESSURE: 103 MMHG | HEIGHT: 59 IN | SYSTOLIC BLOOD PRESSURE: 180 MMHG | HEART RATE: 73 BPM

## 2017-09-12 DIAGNOSIS — I10 ESSENTIAL HYPERTENSION: Primary | ICD-10-CM

## 2017-09-12 DIAGNOSIS — M25.512 CHRONIC LEFT SHOULDER PAIN: ICD-10-CM

## 2017-09-12 DIAGNOSIS — Z79.4 TYPE 2 DIABETES MELLITUS WITH HYPERGLYCEMIA, WITH LONG-TERM CURRENT USE OF INSULIN: ICD-10-CM

## 2017-09-12 DIAGNOSIS — G89.29 CHRONIC LEFT SHOULDER PAIN: ICD-10-CM

## 2017-09-12 DIAGNOSIS — M25.512 LEFT SHOULDER PAIN, UNSPECIFIED CHRONICITY: Primary | ICD-10-CM

## 2017-09-12 DIAGNOSIS — E11.65 TYPE 2 DIABETES MELLITUS WITH HYPERGLYCEMIA, WITH LONG-TERM CURRENT USE OF INSULIN: ICD-10-CM

## 2017-09-12 DIAGNOSIS — I10 HTN (HYPERTENSION), MALIGNANT: ICD-10-CM

## 2017-09-12 PROCEDURE — 3078F DIAST BP <80 MM HG: CPT | Mod: S$GLB,,, | Performed by: FAMILY MEDICINE

## 2017-09-12 PROCEDURE — 3045F PR MOST RECENT HEMOGLOBIN A1C LEVEL 7.0-9.0%: CPT | Mod: S$GLB,,, | Performed by: FAMILY MEDICINE

## 2017-09-12 PROCEDURE — 3008F BODY MASS INDEX DOCD: CPT | Mod: S$GLB,,, | Performed by: FAMILY MEDICINE

## 2017-09-12 PROCEDURE — 99499 UNLISTED E&M SERVICE: CPT | Mod: S$GLB,,, | Performed by: FAMILY MEDICINE

## 2017-09-12 PROCEDURE — 3077F SYST BP >= 140 MM HG: CPT | Mod: S$GLB,,, | Performed by: FAMILY MEDICINE

## 2017-09-12 PROCEDURE — 99999 PR PBB SHADOW E&M-EST. PATIENT-LVL IV: CPT | Mod: PBBFAC,,, | Performed by: FAMILY MEDICINE

## 2017-09-12 PROCEDURE — 99214 OFFICE O/P EST MOD 30 MIN: CPT | Mod: S$GLB,,, | Performed by: FAMILY MEDICINE

## 2017-09-12 PROCEDURE — 3066F NEPHROPATHY DOC TX: CPT | Mod: S$GLB,,, | Performed by: FAMILY MEDICINE

## 2017-09-12 RX ORDER — DEXTROSE 4 G
1 TABLET,CHEWABLE ORAL SEE ADMIN INSTRUCTIONS
Qty: 1 EACH | Refills: 0 | Status: SHIPPED | OUTPATIENT
Start: 2017-09-12 | End: 2018-09-12

## 2017-09-12 RX ORDER — LANCETS 33 GAUGE
1 EACH MISCELLANEOUS
Qty: 200 EACH | Refills: 3 | Status: SHIPPED | OUTPATIENT
Start: 2017-09-12 | End: 2019-01-02

## 2017-09-12 RX ORDER — HYDRALAZINE HYDROCHLORIDE 50 MG/1
100 TABLET, FILM COATED ORAL EVERY 12 HOURS
Qty: 60 TABLET | Refills: 11 | Status: SHIPPED | OUTPATIENT
Start: 2017-09-12 | End: 2018-12-17

## 2017-09-12 NOTE — PROGRESS NOTES
Subjective:       Patient ID: Steff Johnson is a 57 y.o. female.    Chief Complaint: Follow-up    Patient Active Problem List   Diagnosis    Hypertension    COPD (chronic obstructive pulmonary disease)    Type 2 diabetes mellitus with hyperglycemia, with long-term current use of insulin    Chronic pain    Pain in both knees    Proteinuria   Reviewed recent labs  Lab Visit on 08/15/2017   Component Date Value Ref Range Status    Microalbum.,U,Random 08/15/2017 23.0  ug/mL Final    Creatinine, Random Ur 08/15/2017 188.0  15.0 - 325.0 mg/dL Final    Microalb Creat Ratio 08/15/2017 12.2  0.0 - 30.0 ug/mg Final   Lab Visit on 08/15/2017   Component Date Value Ref Range Status    Sodium 08/15/2017 141  136 - 145 mmol/L Final    Potassium 08/15/2017 3.9  3.5 - 5.1 mmol/L Final    Chloride 08/15/2017 109  95 - 110 mmol/L Final    CO2 08/15/2017 23  23 - 29 mmol/L Final    Glucose 08/15/2017 141* 70 - 110 mg/dL Final    BUN, Bld 08/15/2017 17  6 - 20 mg/dL Final    Creatinine 08/15/2017 1.0  0.5 - 1.4 mg/dL Final    Calcium 08/15/2017 9.6  8.7 - 10.5 mg/dL Final    Total Protein 08/15/2017 7.7  6.0 - 8.4 g/dL Final    Albumin 08/15/2017 3.5  3.5 - 5.2 g/dL Final    Total Bilirubin 08/15/2017 0.7  0.1 - 1.0 mg/dL Final    Alkaline Phosphatase 08/15/2017 90  55 - 135 U/L Final    AST 08/15/2017 23  10 - 40 U/L Final    ALT 08/15/2017 22  10 - 44 U/L Final    Anion Gap 08/15/2017 9  8 - 16 mmol/L Final    eGFR if African American 08/15/2017 >60.0  >60 mL/min/1.73 m^2 Final    eGFR if non African American 08/15/2017 >60.0  >60 mL/min/1.73 m^2 Final    Hemoglobin A1C 08/16/2017 9.0* 4.0 - 5.6 % Final    Estimated Avg Glucose 08/16/2017 212* 68 - 131 mg/dL Final    Cholesterol 08/15/2017 184  120 - 199 mg/dL Final    Triglycerides 08/15/2017 76  30 - 150 mg/dL Final    HDL 08/15/2017 65  40 - 75 mg/dL Final    LDL Cholesterol 08/15/2017 103.8  63.0 - 159.0 mg/dL Final    HDL/Chol Ratio 08/15/2017  35.3  20.0 - 50.0 % Final    Total Cholesterol/HDL Ratio 08/15/2017 2.8  2.0 - 5.0 Final    Non-HDL Cholesterol 08/15/2017 119  mg/dL Final    WBC 08/15/2017 10.09  3.90 - 12.70 K/uL Final    RBC 08/15/2017 4.47  4.00 - 5.40 M/uL Final    Hemoglobin 08/15/2017 12.3  12.0 - 16.0 g/dL Final    Hematocrit 08/15/2017 33.7* 37.0 - 48.5 % Final    MCV 08/15/2017 75* 82 - 98 fL Final    MCH 08/15/2017 27.5  27.0 - 31.0 pg Final    MCHC 08/15/2017 36.5* 32.0 - 36.0 g/dL Final    RDW 08/15/2017 14.3  11.5 - 14.5 % Final    Platelets 08/15/2017 406* 150 - 350 K/uL Final    MPV 08/15/2017 10.0  9.2 - 12.9 fL Final    Gran # 08/15/2017 5.9  1.8 - 7.7 K/uL Final    Lymph # 08/15/2017 3.2  1.0 - 4.8 K/uL Final    Mono # 08/15/2017 0.7  0.3 - 1.0 K/uL Final    Eos # 08/15/2017 0.2  0.0 - 0.5 K/uL Final    Baso # 08/15/2017 0.03  0.00 - 0.20 K/uL Final    Gran% 08/15/2017 59.5  38.0 - 73.0 % Final    Lymph% 08/15/2017 31.6  18.0 - 48.0 % Final    Mono% 08/15/2017 6.8  4.0 - 15.0 % Final    Eosinophil% 08/15/2017 1.8  0.0 - 8.0 % Final    Basophil% 08/15/2017 0.3  0.0 - 1.9 % Final    Differential Method 08/15/2017 Automated   Final   HTN- didn't take BP meds today-rushing.  No sx.  Feels her BP is elevated due to pain.  Reviewed meds and is taking all meds as precribed.  Saw Dr. Onesimo liu 5/17 who evaluated her proteinuria but did not change her BP meds.      Type 2 Dm- bs at home  but a1c 9.  Checks fasting am and qhs.  Has old meter.  Taking meds as prescribed.      Chronic left shoulder pain- states due to rotator cuff tear. Seeing pain doctor margaret Sotelo PM and R and taking norco 10  Tid.  Had mri left shoulder last month at [facility].  She was told surgery was not an option but would like another opinion    HPI  Review of Systems   Constitutional: Negative for fatigue and unexpected weight change.   Respiratory: Negative for chest tightness and shortness of breath.     Cardiovascular: Negative for chest pain, palpitations and leg swelling.   Gastrointestinal: Negative for abdominal pain.   Musculoskeletal: Positive for arthralgias.   Neurological: Negative for dizziness, syncope, light-headedness and headaches.       Objective:      Physical Exam   Constitutional: She is oriented to person, place, and time. She appears well-developed and well-nourished.   Cardiovascular: Normal rate, regular rhythm and normal heart sounds.    Pulmonary/Chest: Effort normal and breath sounds normal.   Musculoskeletal: She exhibits no edema.        Left shoulder: She exhibits decreased range of motion, tenderness and pain.        Arms:  Neurological: She is alert and oriented to person, place, and time.   Skin: Skin is warm and dry.   Psychiatric: She has a normal mood and affect.   Nursing note and vitals reviewed.      Assessment:       1. Essential hypertension    2. Type 2 diabetes mellitus with hyperglycemia, with long-term current use of insulin    3. Chronic left shoulder pain    4. HTN (hypertension), malignant        Plan:       1. Essential hypertension  Uncontrolled and maxed on 4 meds almost 5.  Will increase to :  - hydrALAZINE (APRESOLINE) 50 MG tablet; Take 2 tablets (100 mg total) by mouth every 12 (twelve) hours.  Dispense: 60 tablet; Refill: 11  - US Doppler Abd/Retroperitoneal Limited; Future    2. Type 2 diabetes mellitus with hyperglycemia, with long-term current use of insulin  Order new supplies.  Refer for:  - blood-glucose meter (TRUE METRIX GLUCOSE METER) Misc; 1 each by Misc.(Non-Drug; Combo Route) route As instructed (Diagnosis E11.65).  Dispense: 1 each; Refill: 0  - blood sugar diagnostic Strp; 1 strip four times daily before meals and bedtime. True Metrix or equivalent covered by insurance. Diagnosis: E11.65  Dispense: 200 each; Refill: 3  - lancets 33 gauge Misc; 1 lancet by Misc.(Non-Drug; Combo Route) route 4 (four) times daily before meals and nightly. For True  Metrix system. Diagnosis: E11.65  Dispense: 200 each; Refill: 3  - Ambulatory Referral to Diabetes Education  Adhere to diet, check BS premeal and qhs and adjust meds   To get to goal    3. Chronic left shoulder pain  Get mri report and refer for second opinion  - Ambulatory referral to Orthopedics    4. HTN (hypertension), malignant  Uncontrolled. I counseled the patient on HTN education, management and recommendations.  I recommended weight loss toward a BMI < 25, avoidance of salt and the DASH diet, regular cardio exercise a minimum of 150 minutes per week and medications if indicated.  Printed materials were given.    - hydrALAZINE (APRESOLINE) 50 MG tablet; Take 2 tablets (100 mg total) by mouth every 12 (twelve) hours.  Dispense: 60 tablet; Refill: 11    [facility] goal documentation:  Patient readiness: acceptance and barriers:readiness  During the course of the visit the patient was educated and counseled about the following: Diabetes:  Discussed general issues about diabetes pathophysiology and management.  Hypertension:   Dietary sodium restriction.  Regular aerobic exercise.  Goals: Diabetes: Maintain Hemoglobin A1C below 7 and Hypertension: Reduce Blood Pressure  Goal/Outcomes met:none  The following self management tools provided:blood pressure log  Patient Instructions (the written plan) was given to the patient/family: Yes  Time spent with patient: 20 minutes    Patient with be reevaluated in 3 months or sooner prn. 4 weeks with KLARISSA Queen for DM and HTN recheck    Greater than 50% of this visit was spent counseling as described in above documentation:Yes

## 2017-09-12 NOTE — PATIENT INSTRUCTIONS

## 2017-09-12 NOTE — PROGRESS NOTES
Pre-Visit Chart Review  For Appointment Scheduled on 9-12-17    Health Maintenance Due   Topic Date Due    TETANUS VACCINE  01/08/1976    Influenza Vaccine  08/01/2017    Eye Exam  08/01/2017    Mammogram  09/25/2017

## 2017-09-12 NOTE — TELEPHONE ENCOUNTER
Called [facility] and left message on medical records voicemail to please fax us the results of patients MRI from last month number given.

## 2017-09-12 NOTE — TELEPHONE ENCOUNTER
----- Message from Cristina Ren MD sent at 9/12/2017 10:43 AM CDT -----  Get MRI left shoulder done at [facility] last month

## 2017-09-14 ENCOUNTER — TELEPHONE (OUTPATIENT)
Dept: FAMILY MEDICINE | Facility: CLINIC | Age: 57
End: 2017-09-14

## 2017-09-14 NOTE — TELEPHONE ENCOUNTER
Spoke with Arsh at [facility] who said that patient has not had any xrays at their facilities.    Called Mrs. Johnson and she said she had it done [facility] which [facility]. Will call them for the report.

## 2017-09-14 NOTE — TELEPHONE ENCOUNTER
----- Message from Lorin Womack sent at 9/13/2017 12:23 PM CDT -----  Contact: Lisandra Fry returning your call and states don't have anything on this pt....[phone]

## 2017-09-20 ENCOUNTER — HOSPITAL ENCOUNTER (OUTPATIENT)
Dept: RADIOLOGY | Facility: CLINIC | Age: 57
Discharge: HOME | End: 2017-09-20
Attending: FAMILY MEDICINE
Payer: MEDICARE

## 2017-09-20 DIAGNOSIS — I10 HTN (HYPERTENSION), MALIGNANT: Primary | ICD-10-CM

## 2017-09-20 DIAGNOSIS — I10 HTN (HYPERTENSION), MALIGNANT: ICD-10-CM

## 2017-09-20 PROCEDURE — 76770 US EXAM ABDO BACK WALL COMP: CPT | Mod: TC,PO

## 2017-09-20 PROCEDURE — 93975 VASCULAR STUDY: CPT | Mod: 26,,, | Performed by: RADIOLOGY

## 2017-09-20 PROCEDURE — 76770 US EXAM ABDO BACK WALL COMP: CPT | Mod: 26,59,, | Performed by: RADIOLOGY

## 2017-09-21 ENCOUNTER — HOSPITAL ENCOUNTER (OUTPATIENT)
Dept: RADIOLOGY | Facility: HOSPITAL | Age: 57
Discharge: HOME | End: 2017-09-21
Attending: ORTHOPAEDIC SURGERY
Payer: MEDICARE

## 2017-09-21 ENCOUNTER — OFFICE VISIT (OUTPATIENT)
Dept: ORTHOPEDICS | Facility: CLINIC | Age: 57
End: 2017-09-21
Attending: ORTHOPAEDIC SURGERY
Payer: MEDICARE

## 2017-09-21 VITALS
SYSTOLIC BLOOD PRESSURE: 165 MMHG | DIASTOLIC BLOOD PRESSURE: 85 MMHG | BODY MASS INDEX: 28.22 KG/M2 | HEART RATE: 69 BPM | WEIGHT: 140 LBS | HEIGHT: 59 IN

## 2017-09-21 DIAGNOSIS — M75.102 ROTATOR CUFF SYNDROME, LEFT: Primary | ICD-10-CM

## 2017-09-21 DIAGNOSIS — M25.512 LEFT SHOULDER PAIN, UNSPECIFIED CHRONICITY: ICD-10-CM

## 2017-09-21 PROBLEM — G89.29 CHRONIC LEFT SHOULDER PAIN: Status: RESOLVED | Noted: 2017-09-12 | Resolved: 2017-09-21

## 2017-09-21 PROBLEM — M25.561 PAIN IN BOTH KNEES: Status: RESOLVED | Noted: 2017-04-05 | Resolved: 2017-09-21

## 2017-09-21 PROBLEM — M25.562 PAIN IN BOTH KNEES: Status: RESOLVED | Noted: 2017-04-05 | Resolved: 2017-09-21

## 2017-09-21 PROCEDURE — 3077F SYST BP >= 140 MM HG: CPT | Mod: S$GLB,,, | Performed by: ORTHOPAEDIC SURGERY

## 2017-09-21 PROCEDURE — 73030 X-RAY EXAM OF SHOULDER: CPT | Mod: TC,PN,LT

## 2017-09-21 PROCEDURE — 73030 X-RAY EXAM OF SHOULDER: CPT | Mod: 26,LT,, | Performed by: RADIOLOGY

## 2017-09-21 PROCEDURE — 99999 PR PBB SHADOW E&M-EST. PATIENT-LVL III: CPT | Mod: PBBFAC,,, | Performed by: ORTHOPAEDIC SURGERY

## 2017-09-21 PROCEDURE — 3008F BODY MASS INDEX DOCD: CPT | Mod: S$GLB,,, | Performed by: ORTHOPAEDIC SURGERY

## 2017-09-21 PROCEDURE — 99213 OFFICE O/P EST LOW 20 MIN: CPT | Mod: S$GLB,,, | Performed by: ORTHOPAEDIC SURGERY

## 2017-09-21 PROCEDURE — 99499 UNLISTED E&M SERVICE: CPT | Mod: S$GLB,,, | Performed by: ORTHOPAEDIC SURGERY

## 2017-09-21 PROCEDURE — 3079F DIAST BP 80-89 MM HG: CPT | Mod: S$GLB,,, | Performed by: ORTHOPAEDIC SURGERY

## 2017-09-21 NOTE — LETTER
September 21, 2017      Cristina Ren MD  [address]  [facility] [address]           [address] [address]  Phone: [phone]          Patient: Steff Johnson   MR Number: [id number]   YOB: 1960   Date of Visit: 9/21/2017       Dear Dr. Cristina Ren:    Thank you for referring Steff Johnson to me for evaluation. Attached you will find relevant portions of my assessment and plan of care.    If you have questions, please do not hesitate to call me. I look forward to following Steff Johnson along with you.    Sincerely,    Messi Molina MD    Enclosure  CC:  No Recipients    If you would like to receive this communication electronically, please contact [email or web address] or [phone] to request more information on [employer] Link access.    For providers and/or their staff who would like to refer a patient to [facility], please contact us through our one-stop-shop provider referral line, [facility], at [phone].    If you feel you have received this communication in error or would no longer like to receive these types of communications, please e-mail [email or web address]

## 2017-09-21 NOTE — PROGRESS NOTES
"Past Medical History:   Diagnosis Date    COPD (chronic obstructive pulmonary disease)     Diabetes mellitus     Hypertension        Past Surgical History:   Procedure Laterality Date    COLONOSCOPY N/A 4/11/2017    Procedure: COLONOSCOPY;  Surgeon: Jared Antonio MD;  Location: [facility];  Service: Endoscopy;  Laterality: N/A;    HYSTERECTOMY      SHOULDER SURGERY      R       Current Outpatient Prescriptions   Medication Sig    amlodipine-valsartan (EXFORGE)  mg per tablet Take 1 tablet by mouth once daily.    blood sugar diagnostic Strp 1 strip four times daily before meals and bedtime. True Metrix or equivalent covered by insurance. Diagnosis: E11.65    blood-glucose meter (TRUE METRIX GLUCOSE METER) Misc 1 each by Misc.(Non-Drug; Combo Route) route As instructed (Diagnosis E11.65).    chlorthalidone (HYGROTEN) 25 MG Tab Take 1 tablet (25 mg total) by mouth once daily.    gabapentin (NEURONTIN) 300 MG capsule TK 1 C PO HS    hydrALAZINE (APRESOLINE) 50 MG tablet Take 2 tablets (100 mg total) by mouth every 12 (twelve) hours.    hydrocodone-acetaminophen 10-325mg (NORCO)  mg Tab every 8 (eight) hours as needed for Pain.     insulin glargine (LANTUS SOLOSTAR) 100 unit/mL (3 mL) InPn pen 30 u sq bid (Patient taking differently: 30 Units 2 (two) times daily. 30 u sq bid)    lancets 33 gauge Misc 1 lancet by Misc.(Non-Drug; Combo Route) route 4 (four) times daily before meals and nightly. For True Metrix system. Diagnosis: E11.65    lidocaine-prilocaine (EMLA) cream as needed.     metformin (GLUCOPHAGE-XR) 500 MG 24 hr tablet 2 TABLETS TWICE DAILY WITH MEALS (Patient taking differently: 2 TABLETS TWICE DAILY WITH MEALS- Pt taking 1 tablet twice a day)    metoprolol succinate (TOPROL-XL) 200 MG 24 hr tablet Take 1 tablet (200 mg total) by mouth once daily.    pen needle, diabetic (PEN NEEDLE) 30 gauge x 5/16" Ndle Inject 2 x /day    tiotropium (SPIRIVA WITH HANDIHALER) 18 mcg " inhalation capsule Inhale 1 capsule (18 mcg total) into the lungs once daily. (Patient taking differently: Inhale 18 mcg into the lungs once daily. Pt does not have-pending insurance)    zolpidem (AMBIEN) 10 mg tablet Take 10 mg by mouth nightly as needed. Pt does not have pending ins approval     No current facility-administered medications for this visit.        Review of patient's allergies indicates:  No Known Allergies    Family History   Problem Relation Age of Onset    Diabetes Mother     Asthma Mother     Hypertension Mother     Diabetes Father     Diabetes Brother     Hypertension Brother     Anemia Daughter        Social History     Social History    Marital status: Single     Spouse name: N/A    Number of children: N/A    Years of education: N/A     Occupational History          disabled due to shoulder problems     Social History Main Topics    Smoking status: Never Smoker    Smokeless tobacco: Never Used    Alcohol use No    Drug use: No    Sexual activity: Not Currently     Other Topics Concern    Not on file     Social History Narrative    No narrative on file       Chief Complaint:   Chief Complaint   Patient presents with    Left Shoulder - Pain       History of present illness: Is a 57-year-old right-hand-dominant female seen for chronic left shoulder pain.  Patient is a type II diabetic.  Patient has a history of prior rotator cuff issues on the right ultimately requiring surgery.  She has had problems now on the left shoulder for about 3 years.  She's had a prior cortisone injection but had a reaction to it so she does not want another one.  She has some physical therapy ordered but has not started yet due to dealing with some family issues with her granddaughter.  Pain is an 8 out of 10.  Pain radiates down her arm at times.  She has difficulty reaching above her head or across her chest or back.  Symptoms are severe.      Review of Systems:    Constitution: Negative for  chills, fever, and sweats.  Negative for unexplained weight loss.    HENT:  Negative for headaches and blurry vision.    Cardiovascular:Negative for chest pain or irregular heart beat. Negative for hypertension.    Respiratory:  Negative for cough and shortness of breath.    Gastrointestinal: Negative for abdominal pain, heartburn, melena, nausea, and vomitting.    Genitourinary:  Negative bladder incontinence and dysuria.    Musculoskeletal:  See HPI    Neurological: Negative for numbness.    Psychiatric/Behavioral: Negative for depression.  The patient is not nervous/anxious.      Endocrine: Negative for polyuria    Hematologic/Lymphatic: Negative for bleeding problem.  Does not bruise/bleed easily.    Skin: Negative for poor would healing and rash      Physical Examination:    Vital Signs:    Vitals:    09/21/17 1420   BP: (!) 165/85   Pulse: 69       Body mass index is 28.28 kg/m².    This a well-developed, well nourished patient in no acute distress.  They are alert and oriented and cooperative to examination.  Pt. walks without an antalgic gait.      Examination of the left shoulder shows no rashes or erythema. There are no masses, ecchymosis, or atrophy. The patient has full range of motion in forward flexion, external rotation, and internal rotation to the mid T-spine. The patient has moderately positive impingement signs. - [place]'s test. - Speeds test. Nontender to palpation over a.c. joint. Normal stability anteriorly, posteriorly, and negative sulcus sign. Passive range of motion: Forward flexion of 180°, external rotation at 90° of 90°, internal rotation of 50°, and external rotation at 0° of 50°. 2+ radial pulse. Intact axillary, radial, median and ulnar sensation. 5 out of 5 resisted forward flexion, external rotation, and negative lift off test.    Examination of the right shoulder shows no rashes or erythema. There are no masses, ecchymosis, or atrophy. The patient has full range of motion in  forward flexion, external rotation, and internal rotation to the mid T-spine. The patient has - impingement signs. - [place]'s test. - Speeds test. Nontender to palpation over a.c. joint. Normal stability anteriorly, posteriorly, and negative sulcus sign. Passive range of motion: Forward flexion of 180°, external rotation at 90° of 90°, internal rotation of 50°, and external rotation at 0° of 50°. 2+ radial pulse. Intact axillary, radial, median and ulnar sensation. 5 out of 5 resisted forward flexion, external rotation, and negative lift off test.    X-rays: X-rays left shoulder ordered and reviewed which show degenerative changes both around the acromioclavicular joint.  Patient also has what looks like possible loose bodies within the subacromial bursa.  She also has some thickening at the greater tuberosity consistent with chronic rotator cuff issues.     Assessment:: Left rotator cuff syndrome    Plan:  I reviewed the findings and the x-rays with her today.  It would certainly not surprised me if she has a rotator cuff tear on the left shoulder like she did on the right.  I did recommend that she start some physical therapy and try that first.  Follow-up in about 6 weeks.  I'll like to get an MRI if her shoulder is not improving.    This note was created using Dragon voice recognition software that occasionally misinterpreted phrases or words.    Consult note is delivered via Epic messaging service.

## 2017-09-27 DIAGNOSIS — N28.89 RENAL MASS: Primary | ICD-10-CM

## 2017-10-05 ENCOUNTER — DOCUMENTATION ONLY (OUTPATIENT)
Dept: FAMILY MEDICINE | Facility: CLINIC | Age: 57
End: 2017-10-05

## 2017-10-05 NOTE — PROGRESS NOTES
Pre-Visit Chart Review  For Appointment Scheduled on 10/10/2017    Health Maintenance Due   Topic Date Due    TETANUS VACCINE  01/08/1976    Influenza Vaccine  08/01/2017    Eye Exam  08/01/2017    Mammogram  09/25/2017

## 2017-10-06 DIAGNOSIS — N28.89 RENAL MASS: Primary | ICD-10-CM

## 2017-10-07 ENCOUNTER — HOSPITAL ENCOUNTER (OUTPATIENT)
Dept: RADIOLOGY | Facility: HOSPITAL | Age: 57
Discharge: HOME | End: 2017-10-07
Attending: FAMILY MEDICINE
Payer: MEDICARE

## 2017-10-07 DIAGNOSIS — N28.89 RENAL MASS: ICD-10-CM

## 2017-10-07 PROCEDURE — 74170 CT ABD WO CNTRST FLWD CNTRST: CPT | Mod: 26,,, | Performed by: RADIOLOGY

## 2017-10-07 PROCEDURE — 74170 CT ABD WO CNTRST FLWD CNTRST: CPT | Mod: TC

## 2017-10-07 PROCEDURE — 25500020 PHARM REV CODE 255

## 2017-10-07 RX ADMIN — IOHEXOL 15 ML: 350 INJECTION, SOLUTION INTRAVENOUS at 09:10

## 2017-10-07 RX ADMIN — IOHEXOL 75 ML: 350 INJECTION, SOLUTION INTRAVENOUS at 09:10

## 2017-10-16 DIAGNOSIS — K82.8 GALLBLADDER MASS: Primary | ICD-10-CM

## 2017-10-16 DIAGNOSIS — R93.5 ABNORMAL ABDOMINAL CT SCAN: ICD-10-CM

## 2017-10-16 DIAGNOSIS — R16.1 SPLENIC MASS: ICD-10-CM

## 2017-10-18 ENCOUNTER — TELEPHONE (OUTPATIENT)
Dept: FAMILY MEDICINE | Facility: CLINIC | Age: 57
End: 2017-10-18

## 2017-10-18 NOTE — TELEPHONE ENCOUNTER
----- Message from Cristina Ren MD sent at 10/16/2017  8:31 AM CDT -----  Notify patient:  Your CT of the abdomen showed: 1.  No evidence for renal mass or other acute renal abnormality.  2. Abnormal septated cystic mass within the gallbladder fossa with associated gallbladder displacement. This may communicate with the gallbladder. Appearance is most suggestive of a lymphangioma, noting that this is a rare entity. Further evaluation with abdominal MRI is recommended.  3. Approximately 3.2 cm solid mass involving the spleen. This is indeterminate with differential considerations including a primary splenic tumor such as a lymphangioma or hemangioma and a splenic metastasis. Attention on followup MRI is recommended.  4. 4 mm pulmonary nodule within the left lung base. One year followup CT of the thorax is recommended for further evaluation.  5. Mild hepatic steatosis.(fatty liver)    I am going to schedule an mri to further evaluate the spleen and gallbladder findings.     We are honored that you have chosen [facility] for your health care needs. Thank you and we look forward to seeing you again.   Please let me know if you have any questions.      Sincerely,  Cristina Ren MD

## 2017-10-18 NOTE — TELEPHONE ENCOUNTER
Advised patient of her CT results. Explained what its suggestive of. Also, discussed the plan to do a MRI for further diagnostic ability. Was able to schedule her for 10/24/17 at 430pm. We went over her prep/fasting, attire and what to expect. I also advised that as soon as we get her results we would call her.

## 2017-10-24 ENCOUNTER — HOSPITAL ENCOUNTER (OUTPATIENT)
Dept: RADIOLOGY | Facility: HOSPITAL | Age: 57
Discharge: HOME | End: 2017-10-24
Attending: FAMILY MEDICINE
Payer: MEDICARE

## 2017-10-24 DIAGNOSIS — R93.5 ABNORMAL ABDOMINAL CT SCAN: ICD-10-CM

## 2017-10-24 DIAGNOSIS — K82.8 GALLBLADDER MASS: ICD-10-CM

## 2017-10-24 DIAGNOSIS — R16.1 SPLENIC MASS: ICD-10-CM

## 2017-10-24 PROCEDURE — 25500020 PHARM REV CODE 255: Performed by: FAMILY MEDICINE

## 2017-10-24 PROCEDURE — A9585 GADOBUTROL INJECTION: HCPCS | Performed by: FAMILY MEDICINE

## 2017-10-24 PROCEDURE — 74183 MRI ABD W/O CNTR FLWD CNTR: CPT | Mod: 26,,, | Performed by: RADIOLOGY

## 2017-10-24 PROCEDURE — 74183 MRI ABD W/O CNTR FLWD CNTR: CPT | Mod: TC

## 2017-10-24 RX ORDER — GADOBUTROL 604.72 MG/ML
INJECTION INTRAVENOUS
Status: DISCONTINUED
Start: 2017-10-24 | End: 2017-10-25 | Stop reason: HOSPADM

## 2017-10-24 RX ORDER — GADOBUTROL 604.72 MG/ML
6 INJECTION INTRAVENOUS
Status: COMPLETED | OUTPATIENT
Start: 2017-10-24 | End: 2017-10-24

## 2017-10-24 RX ADMIN — GADOBUTROL 6 ML: 604.72 INJECTION INTRAVENOUS at 04:10

## 2017-10-25 DIAGNOSIS — R16.1 SPLENIC MASS: Primary | ICD-10-CM

## 2017-10-27 ENCOUNTER — TELEPHONE (OUTPATIENT)
Dept: FAMILY MEDICINE | Facility: CLINIC | Age: 57
End: 2017-10-27

## 2017-10-27 NOTE — TELEPHONE ENCOUNTER
----- Message from Cristina Ren MD sent at 10/25/2017  2:22 PM CDT -----  Notify patient:  THe MRI shows a mass in the spleen that requires further work up.  This may be benign but could represent a cancer and will likely need a biopsy.  I am referring her to surgery for a consult.

## 2017-10-27 NOTE — TELEPHONE ENCOUNTER
Appointment scheduled for patient on Monday 10/30/17 @ 240p to discuss her test results and plan.

## 2017-10-30 ENCOUNTER — OFFICE VISIT (OUTPATIENT)
Dept: FAMILY MEDICINE | Facility: CLINIC | Age: 57
End: 2017-10-30
Payer: MEDICARE

## 2017-10-30 VITALS
SYSTOLIC BLOOD PRESSURE: 208 MMHG | HEIGHT: 59 IN | TEMPERATURE: 98 F | WEIGHT: 139.56 LBS | BODY MASS INDEX: 28.13 KG/M2 | HEART RATE: 72 BPM | DIASTOLIC BLOOD PRESSURE: 100 MMHG

## 2017-10-30 DIAGNOSIS — E11.65 TYPE 2 DIABETES MELLITUS WITH HYPERGLYCEMIA, WITH LONG-TERM CURRENT USE OF INSULIN: ICD-10-CM

## 2017-10-30 DIAGNOSIS — R16.1 SPLENIC MASS: ICD-10-CM

## 2017-10-30 DIAGNOSIS — Z79.4 TYPE 2 DIABETES MELLITUS WITH HYPERGLYCEMIA, WITH LONG-TERM CURRENT USE OF INSULIN: ICD-10-CM

## 2017-10-30 DIAGNOSIS — I10 ESSENTIAL HYPERTENSION: Primary | ICD-10-CM

## 2017-10-30 DIAGNOSIS — K82.8 GALLBLADDER MASS: ICD-10-CM

## 2017-10-30 PROCEDURE — 99214 OFFICE O/P EST MOD 30 MIN: CPT | Mod: S$GLB,,, | Performed by: FAMILY MEDICINE

## 2017-10-30 PROCEDURE — 99999 PR PBB SHADOW E&M-EST. PATIENT-LVL III: CPT | Mod: PBBFAC,,, | Performed by: FAMILY MEDICINE

## 2017-10-30 PROCEDURE — 99499 UNLISTED E&M SERVICE: CPT | Mod: S$GLB,,, | Performed by: FAMILY MEDICINE

## 2017-10-30 NOTE — PATIENT INSTRUCTIONS

## 2017-10-31 NOTE — PROGRESS NOTES
Subjective:       Patient ID: Steff Johnson is a 57 y.o. female.    Chief Complaint: Results    Patient Active Problem List   Diagnosis    Hypertension    COPD (chronic obstructive pulmonary disease)    Type 2 diabetes mellitus with hyperglycemia, with long-term current use of insulin    Proteinuria   Had renal scan 9/20/17 to r/o sheryl.  No sign of stenosis but did have a possible renal mass.  CT done to work it up.  No sign of renal mass but there was a septated cystic lesion in the gallbladder and a spleen mass that needed and mri. mRI done which showed 1) Splenic mass, which is nonspecific however neoplastic etiologies including primary splenic neoplasm and metastatic disease are in the differential. Surgical referral is recommended.  2. Gallbladder fossa mass with a benign imaging characteristics with features most compatible with a lymphatic malformation. Similar, smaller lesion along the falciform ligament.  3. Hepatic steatosis.      I discussed need for referral to gen surgery and possibly interventional rad for bx of spleen mass.  Patient is agreeable to this.  HTn still uncontrolled but took meds late today and I am concerned she may be taking meds inconsistently.   HPI  Review of Systems   Constitutional: Negative for fatigue and unexpected weight change.   Respiratory: Negative for chest tightness and shortness of breath.    Cardiovascular: Negative for chest pain, palpitations and leg swelling.   Gastrointestinal: Negative for abdominal pain.   Musculoskeletal: Negative for arthralgias.   Neurological: Negative for dizziness, syncope, light-headedness and headaches.       Objective:      Physical Exam   Constitutional: She is oriented to person, place, and time. She appears well-developed and well-nourished.   Cardiovascular: Normal rate, regular rhythm and normal heart sounds.    Pulmonary/Chest: Effort normal and breath sounds normal.   Musculoskeletal: She exhibits no edema.   Neurological: She  is alert and oriented to person, place, and time.   Skin: Skin is warm and dry.   Psychiatric: She has a normal mood and affect.   Nursing note and vitals reviewed.      Assessment:       1. Essential hypertension    2. Type 2 diabetes mellitus with hyperglycemia, with long-term current use of insulin    3. Splenic mass    4. Gallbladder mass        Plan:       1. Essential hypertension  Uncontrolled.  Refer to nephrology for poorly controlled HTN on multiple meds      2. Type 2 diabetes mellitus with hyperglycemia, with long-term current use of insulin  Stable condition.  Continue current medications.  Will adjust based on lab findings or if condition changes.      3. Splenic mass  Refer for surg consult    4. Gallbladder mass  Benign.  Monitor for worsening symptoms and return to clinic or go to the ER if these occur: fever > 100.4, severe abdominal pain, intractable vomiting, bleeding from the rectum or black tarry stools, dehydration, lethargy or other worsening symptoms.    [facility] goal documentation:  Patient readiness: acceptance and barriers:readiness  During the course of the visit the patient was educated and counseled about the following: Hypertension:   Dietary sodium restriction.  Regular aerobic exercise.  Goals: Hypertension: Reduce Blood Pressure  Goal/Outcomes met:Hypertension  The following self management tools provided:none  Patient Instructions (the written plan) was given to the patient/family: Yes  Time spent with patient: 20 minutes    Patient with be reevaluated in 3 months or sooner prn    Greater than 50% of this visit was spent counseling as described in above documentation:Yes

## 2017-11-01 DIAGNOSIS — Z12.39 SCREENING FOR BREAST CANCER: Primary | ICD-10-CM

## 2017-11-02 ENCOUNTER — OFFICE VISIT (OUTPATIENT)
Dept: ORTHOPEDICS | Facility: CLINIC | Age: 57
End: 2017-11-02
Payer: MEDICARE

## 2017-11-02 VITALS
SYSTOLIC BLOOD PRESSURE: 221 MMHG | HEART RATE: 67 BPM | DIASTOLIC BLOOD PRESSURE: 108 MMHG | BODY MASS INDEX: 28.02 KG/M2 | WEIGHT: 139 LBS | HEIGHT: 59 IN

## 2017-11-02 DIAGNOSIS — M75.102 TEAR OF LEFT ROTATOR CUFF, UNSPECIFIED TEAR EXTENT: Primary | ICD-10-CM

## 2017-11-02 DIAGNOSIS — M25.512 LEFT SHOULDER PAIN, UNSPECIFIED CHRONICITY: Primary | ICD-10-CM

## 2017-11-02 PROCEDURE — 99213 OFFICE O/P EST LOW 20 MIN: CPT | Mod: S$GLB,,, | Performed by: ORTHOPAEDIC SURGERY

## 2017-11-02 PROCEDURE — 99999 PR PBB SHADOW E&M-EST. PATIENT-LVL III: CPT | Mod: PBBFAC,,, | Performed by: ORTHOPAEDIC SURGERY

## 2017-11-02 PROCEDURE — 99499 UNLISTED E&M SERVICE: CPT | Mod: S$GLB,,, | Performed by: ORTHOPAEDIC SURGERY

## 2017-11-02 NOTE — PROGRESS NOTES
"Past Medical History:   Diagnosis Date    COPD (chronic obstructive pulmonary disease)     Diabetes mellitus     Hypertension        Past Surgical History:   Procedure Laterality Date    COLONOSCOPY N/A 4/11/2017    Procedure: COLONOSCOPY;  Surgeon: Jared Antonio MD;  Location: [facility];  Service: Endoscopy;  Laterality: N/A;    HYSTERECTOMY      SHOULDER SURGERY      R       Current Outpatient Prescriptions   Medication Sig    amlodipine-valsartan (EXFORGE)  mg per tablet Take 1 tablet by mouth once daily.    blood sugar diagnostic Strp 1 strip four times daily before meals and bedtime. True Metrix or equivalent covered by insurance. Diagnosis: E11.65    blood-glucose meter (TRUE METRIX GLUCOSE METER) Misc 1 each by Misc.(Non-Drug; Combo Route) route As instructed (Diagnosis E11.65).    chlorthalidone (HYGROTEN) 25 MG Tab Take 1 tablet (25 mg total) by mouth once daily.    gabapentin (NEURONTIN) 300 MG capsule TK 1 C PO HS    hydrALAZINE (APRESOLINE) 50 MG tablet Take 2 tablets (100 mg total) by mouth every 12 (twelve) hours.    hydrocodone-acetaminophen 10-325mg (NORCO)  mg Tab every 8 (eight) hours as needed for Pain.     insulin glargine (LANTUS SOLOSTAR) 100 unit/mL (3 mL) InPn pen 30 u sq bid (Patient taking differently: 30 Units 2 (two) times daily. 30 u sq bid)    lancets 33 gauge Misc 1 lancet by Misc.(Non-Drug; Combo Route) route 4 (four) times daily before meals and nightly. For True Metrix system. Diagnosis: E11.65    lidocaine-prilocaine (EMLA) cream as needed.     metformin (GLUCOPHAGE-XR) 500 MG 24 hr tablet 2 TABLETS TWICE DAILY WITH MEALS (Patient taking differently: 2 TABLETS TWICE DAILY WITH MEALS- Pt taking 1 tablet twice a day)    metoprolol succinate (TOPROL-XL) 200 MG 24 hr tablet Take 1 tablet (200 mg total) by mouth once daily.    pen needle, diabetic (PEN NEEDLE) 30 gauge x 5/16" Ndle Inject 2 x /day    tiotropium (SPIRIVA WITH HANDIHALER) 18 mcg " inhalation capsule Inhale 1 capsule (18 mcg total) into the lungs once daily. (Patient taking differently: Inhale 18 mcg into the lungs once daily. Pt does not have-pending insurance)    zolpidem (AMBIEN) 10 mg tablet Take 10 mg by mouth nightly as needed. Pt does not have pending ins approval     No current facility-administered medications for this visit.        Review of patient's allergies indicates:  No Known Allergies    Family History   Problem Relation Age of Onset    Diabetes Mother     Asthma Mother     Hypertension Mother     Diabetes Father     Diabetes Brother     Hypertension Brother     Anemia Daughter        Social History     Social History    Marital status: Single     Spouse name: N/A    Number of children: N/A    Years of education: N/A     Occupational History          disabled due to shoulder problems     Social History Main Topics    Smoking status: Never Smoker    Smokeless tobacco: Never Used    Alcohol use No    Drug use: No    Sexual activity: Not Currently     Other Topics Concern    Not on file     Social History Narrative    No narrative on file       Chief Complaint:   Chief Complaint   Patient presents with    Shoulder Pain     left shoulder pain        Interval history: Is a 57-year-old right-hand-dominant female seen for chronic left shoulder pain.  Patient is a type II diabetic.  Patient has a history of prior rotator cuff issues on the right ultimately requiring surgery.  She has had problems now on the left shoulder for about 3 years.  She's had a prior cortisone injection but had a reaction to it so she does not want another one.  She has some physical therapy ordered but has not started yet due to dealing with some family issues with her granddaughter.  Pain is an 8 out of 10.  Pain radiates down her arm at times.  She has difficulty reaching above her head or across her chest or back.  Symptoms are severe.    History of present illness: The patient's left  shoulder is not getting much better.  His been another 6 weeks and the symptoms are about the same.  Pain is still an 8 out of 10.  She has not started physical therapy because of transportation problems.      Review of Systems:    Constitution: Negative for chills, fever, and sweats.  Negative for unexplained weight loss.    HENT:  Negative for headaches and blurry vision.    Cardiovascular:Negative for chest pain or irregular heart beat. Negative for hypertension.    Respiratory:  Negative for cough and shortness of breath.    Gastrointestinal: Negative for abdominal pain, heartburn, melena, nausea, and vomitting.    Genitourinary:  Negative bladder incontinence and dysuria.    Musculoskeletal:  See HPI    Neurological: Negative for numbness.    Psychiatric/Behavioral: Negative for depression.  The patient is not nervous/anxious.      Endocrine: Negative for polyuria    Hematologic/Lymphatic: Negative for bleeding problem.  Does not bruise/bleed easily.    Skin: Negative for poor would healing and rash      Physical Examination:    Vital Signs:    Vitals:    11/02/17 1000   BP: (!) 221/108   Pulse: 67       Body mass index is 28.07 kg/m².    This a well-developed, well nourished patient in no acute distress.  They are alert and oriented and cooperative to examination.  Pt. walks without an antalgic gait.      Examination of the left shoulder shows no rashes or erythema. There are no masses, ecchymosis, or atrophy. The patient has full range of motion in forward flexion, external rotation, and internal rotation to the mid T-spine. The patient has moderately positive impingement signs. - [place]'s test. - Speeds test. Nontender to palpation over a.c. joint. Normal stability anteriorly, posteriorly, and negative sulcus sign. Passive range of motion: Forward flexion of 180°, external rotation at 90° of 90°, internal rotation of 50°, and external rotation at 0° of 50°. 2+ radial pulse. Intact axillary, radial, median  and ulnar sensation.  4 out of 5 resisted forward flexion, external rotation, and negative lift off test.      X-rays: X-rays left shoulder reviewed which show degenerative changes both around the acromioclavicular joint.  Patient also has what looks like possible loose bodies within the subacromial bursa.  She also has some thickening at the greater tuberosity consistent with chronic rotator cuff issues.     Assessment:: Left rotator cuff syndrome    Plan:  I recommended getting an MRI of her shoulder.  She hasn't started the physical therapy yet due to some transportation issues.  I told her to hold off on starting until we know exactly what is going on with her shoulder.  Follow-up after the MRI is completed.    This note was created using Dragon voice recognition software that occasionally misinterpreted phrases or words.    Consult note is delivered via Epic messaging service.

## 2017-11-06 ENCOUNTER — OFFICE VISIT (OUTPATIENT)
Dept: SURGERY | Facility: CLINIC | Age: 57
End: 2017-11-06
Payer: MEDICARE

## 2017-11-06 VITALS
WEIGHT: 138.88 LBS | HEART RATE: 80 BPM | SYSTOLIC BLOOD PRESSURE: 199 MMHG | DIASTOLIC BLOOD PRESSURE: 97 MMHG | HEIGHT: 59 IN | BODY MASS INDEX: 28 KG/M2

## 2017-11-06 DIAGNOSIS — D49.0 SPLEEN NEOPLASM: Primary | ICD-10-CM

## 2017-11-06 PROCEDURE — 99204 OFFICE O/P NEW MOD 45 MIN: CPT | Mod: S$GLB,,, | Performed by: SURGERY

## 2017-11-06 PROCEDURE — 99999 PR PBB SHADOW E&M-EST. PATIENT-LVL III: CPT | Mod: PBBFAC,,, | Performed by: SURGERY

## 2017-11-06 NOTE — PROGRESS NOTES
Subjective:       Patient ID: Steff Johnson is a 57 y.o. female.    Chief Complaint: Consult (mass on spleen)    Referred by Dr. Ren for splenic mass.    Patient was found to have incidental 3.2 cm mass on the inferior pole of her spleen during CT scan for renal vascular evaluation.  MRI was performed and was non diagnostic.  She is here to discuss treatment options.  Denies any abdominal pain or symptoms.  No fevers, chills, night sweats, weight gain or loass.      Review of Systems   Constitutional: Negative for appetite change, chills, diaphoresis, fatigue, fever and unexpected weight change.   HENT: Negative for hearing loss, sore throat, trouble swallowing and voice change.    Eyes: Negative for visual disturbance.   Respiratory: Negative for cough, shortness of breath and wheezing.    Cardiovascular: Negative for chest pain, palpitations and leg swelling.   Gastrointestinal: Negative for abdominal distention, abdominal pain, anal bleeding, blood in stool, constipation, diarrhea, nausea, rectal pain and vomiting.   Genitourinary: Negative for difficulty urinating, dysuria, flank pain, frequency, hematuria, menstrual problem and urgency.   Musculoskeletal: Negative for arthralgias, back pain, joint swelling, myalgias and neck pain.   Skin: Negative for pallor and rash.   Neurological: Negative for dizziness, syncope, weakness and headaches.   Hematological: Negative for adenopathy. Does not bruise/bleed easily.   Psychiatric/Behavioral: Negative for suicidal ideas. The patient is not nervous/anxious.        Objective:      Physical Exam   Constitutional: She is oriented to person, place, and time. She appears well-developed and well-nourished. No distress.   HENT:   Head: Normocephalic and atraumatic.   Right Ear: External ear normal.   Left Ear: External ear normal.   Eyes: Conjunctivae are normal. Pupils are equal, round, and reactive to light. Right eye exhibits no discharge. Left eye exhibits no  discharge.   Neck: No tracheal deviation present. No thyromegaly present.   Cardiovascular: Normal rate and regular rhythm.    Pulmonary/Chest: Effort normal. No respiratory distress.   Abdominal: Soft. She exhibits no distension. There is no splenomegaly or hepatomegaly. There is no tenderness. There is no guarding.   Musculoskeletal: She exhibits no edema or tenderness.   Lymphadenopathy:     She has no cervical adenopathy.   Neurological: She is alert and oriented to person, place, and time. No cranial nerve deficit.   Skin: Skin is warm and dry. No rash noted. She is not diaphoretic. No pallor.   Psychiatric: She has a normal mood and affect. Her behavior is normal. Judgment and thought content normal.   Nursing note and vitals reviewed.      Assessment:       1. Spleen neoplasm        Plan:       Discussion had with patient regarding treatment options:  1. Observation and repeat CT scan in 3-6 months  2. IR biopsy if possible  3. Partial splenectomy  4. Total splenectomy.    She basically does not know what to decide and has asked for recommendation.  I suggested CT guided biopsy if possible, and if not, than partial splenectomy (referral to [facility] for this).  We will contact IR and have them review the case for possible biopsy.  All questions answered.

## 2017-11-06 NOTE — LETTER
November 6, 2017      Cristina Ren MD  [address]  [facility] [address]           [facility] General Surgery  [address]  [facility] [address]  Phone: [phone]          Patient: Steff Johnson   MR Number: [id number]   YOB: 1960   Date of Visit: 11/6/2017       Dear Dr. Cristina Ren:    Thank you for referring Steff Johnson to me for evaluation. Attached you will find relevant portions of my assessment and plan of care.    If you have questions, please do not hesitate to call me. I look forward to following Steff Johnson along with you.    Sincerely,    Kane Rodriguez MD    Enclosure  CC:  No Recipients    If you would like to receive this communication electronically, please contact [email or web address] or [phone] to request more information on [employer] Link access.    For providers and/or their staff who would like to refer a patient to [facility], please contact us through our one-stop-shop provider referral line, [facility] , at [phone].    If you feel you have received this communication in error or would no longer like to receive these types of communications, please e-mail [email or web address]

## 2017-11-07 ENCOUNTER — TELEPHONE (OUTPATIENT)
Dept: SURGERY | Facility: CLINIC | Age: 57
End: 2017-11-07

## 2017-11-07 NOTE — TELEPHONE ENCOUNTER
Demographics, CT, MRI reports and order faxed to [facility] regarding biopsy of a splenic mass.  They will review and contact patient to schedule.

## 2017-11-08 ENCOUNTER — HOSPITAL ENCOUNTER (OUTPATIENT)
Dept: RADIOLOGY | Facility: HOSPITAL | Age: 57
Discharge: HOME | End: 2017-11-08
Attending: ORTHOPAEDIC SURGERY
Payer: MEDICARE

## 2017-11-08 DIAGNOSIS — M25.512 LEFT SHOULDER PAIN, UNSPECIFIED CHRONICITY: ICD-10-CM

## 2017-11-08 PROCEDURE — 73221 MRI JOINT UPR EXTREM W/O DYE: CPT | Mod: TC,LT

## 2017-11-08 PROCEDURE — 73221 MRI JOINT UPR EXTREM W/O DYE: CPT | Mod: 26,LT,, | Performed by: RADIOLOGY

## 2017-11-09 ENCOUNTER — DOCUMENTATION ONLY (OUTPATIENT)
Dept: FAMILY MEDICINE | Facility: CLINIC | Age: 57
End: 2017-11-09

## 2017-11-09 ENCOUNTER — OFFICE VISIT (OUTPATIENT)
Dept: ORTHOPEDICS | Facility: CLINIC | Age: 57
End: 2017-11-09
Payer: MEDICARE

## 2017-11-09 ENCOUNTER — TELEPHONE (OUTPATIENT)
Dept: SURGERY | Facility: CLINIC | Age: 57
End: 2017-11-09

## 2017-11-09 VITALS
DIASTOLIC BLOOD PRESSURE: 108 MMHG | WEIGHT: 138 LBS | HEART RATE: 70 BPM | HEIGHT: 59 IN | SYSTOLIC BLOOD PRESSURE: 229 MMHG | BODY MASS INDEX: 27.82 KG/M2

## 2017-11-09 DIAGNOSIS — M75.122 COMPLETE TEAR OF LEFT ROTATOR CUFF: Primary | ICD-10-CM

## 2017-11-09 PROCEDURE — 99214 OFFICE O/P EST MOD 30 MIN: CPT | Mod: 57,S$GLB,, | Performed by: ORTHOPAEDIC SURGERY

## 2017-11-09 PROCEDURE — 99999 PR PBB SHADOW E&M-EST. PATIENT-LVL III: CPT | Mod: PBBFAC,,, | Performed by: ORTHOPAEDIC SURGERY

## 2017-11-09 NOTE — PROGRESS NOTES
Pre-Visit Chart Review  For Appointment Scheduled on 11/13/2017    Health Maintenance Due   Topic Date Due    TETANUS VACCINE  01/08/1976    Eye Exam  08/01/2017    Mammogram  09/25/2017    Hemoglobin A1c  11/15/2017

## 2017-11-09 NOTE — TELEPHONE ENCOUNTER
----- Message from Joes Navarrete sent at 11/9/2017  1:50 PM CST -----  Contact: [facility]/Lauren  Unsuccessful call placed to office.  Lauren called regarding the attached patient and would like a call back at [phone]

## 2017-11-09 NOTE — TELEPHONE ENCOUNTER
The radiologist(at [facility]) is wanting to know more information on the patient.  Does she have a cancer or anything else going on?  He said spleen masses are generally benign and the risk of doing a biopsy may outweigh the benefits.  He feels the best option may be observation unless there is something else going on.

## 2017-11-09 NOTE — PROGRESS NOTES
"Past Medical History:   Diagnosis Date    COPD (chronic obstructive pulmonary disease)     Diabetes mellitus     Hypertension        Past Surgical History:   Procedure Laterality Date    COLONOSCOPY N/A 4/11/2017    Procedure: COLONOSCOPY;  Surgeon: Jared Antonio MD;  Location: [facility];  Service: Endoscopy;  Laterality: N/A;    HYSTERECTOMY      SHOULDER SURGERY      R       Current Outpatient Prescriptions   Medication Sig    amlodipine-valsartan (EXFORGE)  mg per tablet Take 1 tablet by mouth once daily.    blood sugar diagnostic Strp 1 strip four times daily before meals and bedtime. True Metrix or equivalent covered by insurance. Diagnosis: E11.65    blood-glucose meter (TRUE METRIX GLUCOSE METER) Misc 1 each by Misc.(Non-Drug; Combo Route) route As instructed (Diagnosis E11.65).    chlorthalidone (HYGROTEN) 25 MG Tab Take 1 tablet (25 mg total) by mouth once daily.    gabapentin (NEURONTIN) 300 MG capsule TK 1 C PO HS    hydrALAZINE (APRESOLINE) 50 MG tablet Take 2 tablets (100 mg total) by mouth every 12 (twelve) hours.    hydrocodone-acetaminophen 10-325mg (NORCO)  mg Tab every 8 (eight) hours as needed for Pain.     insulin glargine (LANTUS SOLOSTAR) 100 unit/mL (3 mL) InPn pen 30 u sq bid (Patient taking differently: 30 Units 2 (two) times daily. 30 u sq bid)    lancets 33 gauge Misc 1 lancet by Misc.(Non-Drug; Combo Route) route 4 (four) times daily before meals and nightly. For True Metrix system. Diagnosis: E11.65    lidocaine-prilocaine (EMLA) cream as needed.     metformin (GLUCOPHAGE-XR) 500 MG 24 hr tablet 2 TABLETS TWICE DAILY WITH MEALS (Patient taking differently: 2 TABLETS TWICE DAILY WITH MEALS- Pt taking 1 tablet twice a day)    metoprolol succinate (TOPROL-XL) 200 MG 24 hr tablet Take 1 tablet (200 mg total) by mouth once daily.    pen needle, diabetic (PEN NEEDLE) 30 gauge x 5/16" Ndle Inject 2 x /day    tiotropium (SPIRIVA WITH HANDIHALER) 18 mcg " inhalation capsule Inhale 1 capsule (18 mcg total) into the lungs once daily. (Patient taking differently: Inhale 18 mcg into the lungs once daily. Pt does not have-pending insurance)    zolpidem (AMBIEN) 10 mg tablet Take 10 mg by mouth nightly as needed. Pt does not have pending ins approval     No current facility-administered medications for this visit.        Review of patient's allergies indicates:  No Known Allergies    Family History   Problem Relation Age of Onset    Diabetes Mother     Asthma Mother     Hypertension Mother     Diabetes Father     Diabetes Brother     Hypertension Brother     Anemia Daughter        Social History     Social History    Marital status: Single     Spouse name: N/A    Number of children: N/A    Years of education: N/A     Occupational History          disabled due to shoulder problems     Social History Main Topics    Smoking status: Never Smoker    Smokeless tobacco: Never Used    Alcohol use No    Drug use: No    Sexual activity: Not Currently     Other Topics Concern    Not on file     Social History Narrative    No narrative on file       Chief Complaint:   Chief Complaint   Patient presents with    Shoulder Pain     L shoulder mri results        Interval history: Is a 57-year-old right-hand-dominant female seen for chronic left shoulder pain.  Patient is a type II diabetic.  Patient has a history of prior rotator cuff issues on the right ultimately requiring surgery.  She has had problems now on the left shoulder for about 3 years.  She's had a prior cortisone injection but had a reaction to it so she does not want another one.  She has some physical therapy ordered but has not started yet due to dealing with some family issues with her granddaughter.  Pain is an 8 out of 10.  Pain radiates down her arm at times.  She has difficulty reaching above her head or across her chest or back.  Symptoms are severe.    History of present illness: The patient's  left shoulder is not getting much better.  It has been another 6 weeks and the symptoms are about the same.  Pain is still an 8 out of 10.  She has not started physical therapy because of transportation problems.  MRI shows large rotator cuff tear.      Review of Systems:    Constitution: Negative for chills, fever, and sweats.  Negative for unexplained weight loss.    HENT:  Negative for headaches and blurry vision.    Cardiovascular:Negative for chest pain or irregular heart beat. Negative for hypertension.    Respiratory:  Negative for cough and shortness of breath.    Gastrointestinal: Negative for abdominal pain, heartburn, melena, nausea, and vomitting.    Genitourinary:  Negative bladder incontinence and dysuria.    Musculoskeletal:  See HPI    Neurological: Negative for numbness.    Psychiatric/Behavioral: Negative for depression.  The patient is not nervous/anxious.      Endocrine: Negative for polyuria    Hematologic/Lymphatic: Negative for bleeding problem.  Does not bruise/bleed easily.    Skin: Negative for poor would healing and rash      Physical Examination:    Vital Signs:    Vitals:    11/09/17 1116   BP: (!) 229/108   Pulse: 70       Body mass index is 27.87 kg/m².    This a well-developed, well nourished patient in no acute distress.  They are alert and oriented and cooperative to examination.  Pt. walks without an antalgic gait.      Examination of the left shoulder shows no rashes or erythema. There are no masses, ecchymosis, or atrophy. The patient has full range of motion in forward flexion, external rotation, and internal rotation to the mid T-spine. The patient has moderately positive impingement signs. - Bond's test. - Speeds test. Nontender to palpation over a.c. joint.Passive range of motion: Forward flexion of 180°, external rotation at 90° of 90°, internal rotation of 50°, and external rotation at 0° of 50°. 2+ radial pulse. Intact axillary, radial, median and ulnar sensation.  4  out of 5 resisted forward flexion, external rotation, and negative lift off test.      X-rays: X-rays left shoulder reviewed which show degenerative changes both around the acromioclavicular joint.  Patient also has what looks like possible loose bodies within the subacromial bursa.  She also has some thickening at the greater tuberosity consistent with chronic rotator cuff issues.    MRI left shoulder:Complete tear and retraction of the supraspinatus tendon of the rotator cuff. The Rest of the structures of the rotator cuff appear intact with a small joint effusion noted. Hypertrophic changes at the a.c. joint with both superior and inferior spur formation.     Assessment:: Left rotator cuff tear    Plan:  I reviewed the MRI with her today.  Patient has a pretty large supraspinatus tear with retraction back to the glenoid.  We talked about surgical versus nonsurgical treatment.  Patient would like to go ahead and fix this surgically.  We talked about the possibility of needing to use a patch to repair our augment the tear.  We will also do a subacromial decompression and distal clavicle excision for the spurring that she has.Risks, benefits, and alternatives to the procedure were explained to the patient including but not limited to damage to nerves, arteries, blood vessels, bones, tendons, ligaments, stiffness, instability, infection, DVT, PE, as well as general anesthetic complications including seizure, stroke, heart attack and even death. The patient understood these risks and wished to proceed and signed the informed consent.       This note was created using Dragon voice recognition software that occasionally misinterpreted phrases or words.    Consult note is delivered via Epic messaging service.

## 2017-11-13 NOTE — TELEPHONE ENCOUNTER
Requested that the radiologist review the patient's film to see about going ahead with a biopsy, otherwise the spleen will need to be removed and he would like to not have to do that if at all possible.  They will call back after speaking to the radiologist again.

## 2017-11-15 NOTE — TELEPHONE ENCOUNTER
Dr. Rodriguez has ordered the IR biopsy of the spleen. Will route to him for review and to contact [facility] team for further orders

## 2017-11-21 ENCOUNTER — LAB VISIT (OUTPATIENT)
Dept: LAB | Facility: HOSPITAL | Age: 57
End: 2017-11-21
Attending: INTERNAL MEDICINE
Payer: MEDICARE

## 2017-11-21 DIAGNOSIS — R80.1 PERSISTENT PROTEINURIA: ICD-10-CM

## 2017-11-21 LAB
CREAT UR-MCNC: 175 MG/DL
PROT UR-MCNC: 25 MG/DL
PROT/CREAT RATIO, UR: 0.14

## 2017-11-21 PROCEDURE — 82570 ASSAY OF URINE CREATININE: CPT

## 2017-11-22 ENCOUNTER — TELEPHONE (OUTPATIENT)
Dept: NEPHROLOGY | Facility: CLINIC | Age: 57
End: 2017-11-22

## 2017-11-22 DIAGNOSIS — E11.9 TYPE 2 DIABETES MELLITUS WITHOUT COMPLICATION: ICD-10-CM

## 2017-11-22 DIAGNOSIS — Z13.5 DIABETIC RETINOPATHY SCREENING: ICD-10-CM

## 2017-11-28 ENCOUNTER — TELEPHONE (OUTPATIENT)
Dept: SURGERY | Facility: CLINIC | Age: 57
End: 2017-11-28

## 2017-11-28 ENCOUNTER — TELEPHONE (OUTPATIENT)
Dept: FAMILY MEDICINE | Facility: CLINIC | Age: 57
End: 2017-11-28

## 2017-11-28 ENCOUNTER — DOCUMENTATION ONLY (OUTPATIENT)
Dept: FAMILY MEDICINE | Facility: CLINIC | Age: 57
End: 2017-11-28

## 2017-11-28 ENCOUNTER — OFFICE VISIT (OUTPATIENT)
Dept: FAMILY MEDICINE | Facility: CLINIC | Age: 57
End: 2017-11-28
Payer: MEDICARE

## 2017-11-28 ENCOUNTER — LAB VISIT (OUTPATIENT)
Dept: LAB | Facility: HOSPITAL | Age: 57
End: 2017-11-28
Attending: PHYSICIAN ASSISTANT
Payer: MEDICARE

## 2017-11-28 VITALS
HEART RATE: 82 BPM | DIASTOLIC BLOOD PRESSURE: 93 MMHG | OXYGEN SATURATION: 95 % | BODY MASS INDEX: 27.96 KG/M2 | TEMPERATURE: 99 F | HEIGHT: 59 IN | WEIGHT: 138.69 LBS | SYSTOLIC BLOOD PRESSURE: 169 MMHG

## 2017-11-28 DIAGNOSIS — I10 UNCONTROLLED HYPERTENSION: Primary | ICD-10-CM

## 2017-11-28 DIAGNOSIS — Z79.4 TYPE 2 DIABETES MELLITUS WITH HYPERGLYCEMIA, WITH LONG-TERM CURRENT USE OF INSULIN: ICD-10-CM

## 2017-11-28 DIAGNOSIS — Z12.31 ENCOUNTER FOR SCREENING MAMMOGRAM FOR BREAST CANCER: ICD-10-CM

## 2017-11-28 DIAGNOSIS — E11.65 TYPE 2 DIABETES MELLITUS WITH HYPERGLYCEMIA, WITH LONG-TERM CURRENT USE OF INSULIN: ICD-10-CM

## 2017-11-28 DIAGNOSIS — R80.9 PROTEINURIA, UNSPECIFIED TYPE: ICD-10-CM

## 2017-11-28 LAB
ESTIMATED AVG GLUCOSE: 258 MG/DL
HBA1C MFR BLD HPLC: 10.6 %

## 2017-11-28 PROCEDURE — 36415 COLL VENOUS BLD VENIPUNCTURE: CPT | Mod: PO

## 2017-11-28 PROCEDURE — 99214 OFFICE O/P EST MOD 30 MIN: CPT | Mod: S$GLB,,, | Performed by: PHYSICIAN ASSISTANT

## 2017-11-28 PROCEDURE — 83036 HEMOGLOBIN GLYCOSYLATED A1C: CPT

## 2017-11-28 PROCEDURE — 99999 PR PBB SHADOW E&M-EST. PATIENT-LVL V: CPT | Mod: PBBFAC,,, | Performed by: PHYSICIAN ASSISTANT

## 2017-11-28 PROCEDURE — 99499 UNLISTED E&M SERVICE: CPT | Mod: S$GLB,,, | Performed by: PHYSICIAN ASSISTANT

## 2017-11-28 NOTE — PROGRESS NOTES
Pre-Visit Chart Review  For Appointment Scheduled on 11/28/17    Health Maintenance Due   Topic Date Due    TETANUS VACCINE  01/08/1976    Eye Exam  08/01/2017    Mammogram  09/25/2017    Hemoglobin A1c  11/15/2017

## 2017-11-28 NOTE — PROGRESS NOTES
Subjective:       Patient ID: Steffvivi Johnson is a 57 y.o. female.    Chief Complaint: hypertension follow up    Mrs. Johnson is a 57 year old female who presents to clinic for follow up on HTN. Since her last visit, she was evaluated by General Surgery and discussed possible biopsy of spleen with Dr. Rodriguez. She has not heard back from his office to schedule this. She admits she will occasionally forget to take her BP medications. She denies chest pain, shortness of breath, or lower ext edema. She is overdue for Nephrology f/u for proteinuria and PCP recommended she f/u with Nephrology for additionally help with BP given that she is uncontrolled on 5 medications. She is due for HgbA1c and mammogram today. She has no other active complaints today. She is planning to have rotator cuff repair in January.       Review of Systems   Constitutional: Negative for activity change, appetite change, chills, fatigue and fever.   Eyes: Negative for visual disturbance.   Respiratory: Negative for cough and shortness of breath.    Cardiovascular: Negative for chest pain, palpitations and leg swelling.   Gastrointestinal: Negative for abdominal pain, constipation, diarrhea, nausea and vomiting.   Musculoskeletal: Positive for arthralgias (left shoulder).   Neurological: Negative for dizziness, weakness, light-headedness and headaches.       Objective:      Vitals:    11/28/17 1037   BP: (!) 169/93   Pulse: 82   Temp: 98.6 °F (37 °C)     Physical Exam   Constitutional: She appears well-developed and well-nourished.   HENT:   Head: Normocephalic and atraumatic.   Right Ear: Hearing and external ear normal.   Left Ear: Hearing and external ear normal.   Eyes: Conjunctivae and EOM are normal. Pupils are equal, round, and reactive to light.   Cardiovascular: Normal rate, regular rhythm, normal heart sounds and intact distal pulses.    Pulmonary/Chest: Effort normal and breath sounds normal.   Neurological: She is alert.   Skin: Skin is  warm and dry.   Psychiatric: She has a normal mood and affect. Her behavior is normal.   Vitals reviewed.      Assessment:       1. Uncontrolled hypertension    2. Type 2 diabetes mellitus with hyperglycemia, with long-term current use of insulin    3. Proteinuria, unspecified type    4. Encounter for screening mammogram for breast cancer        Plan:       Uncontrolled hypertension  -     Ambulatory referral to Nephrology  - Stress importance of medication compliance. Continue current regimen.   - Monitor BP at home and keep a log. Notify clinic if BP is persistently >140/90.      Type 2 diabetes mellitus with hyperglycemia, with long-term current use of insulin  -     Hemoglobin A1c; Future; Expected date: 11/28/2017  - Continue current medication. Diabetic Education scheduled for next month. Eye exam scheduled for next week.     Proteinuria, unspecified type  -     Ambulatory referral to Nephrology    Encounter for screening mammogram for breast cancer  -     Mammo Digital Screening Bilateral With CAD; Future; Expected date: 11/28/2017    Patient readiness: acceptance and barriers:none    During the course of the visit the patient was educated and counseled about the following:     Diabetes:  Reminded to get yearly retinal exam.  Hypertension:   Dietary sodium restriction.  Check blood pressures daily and record.    Goals: Diabetes: Maintain Hemoglobin A1C below 7 and Hypertension: Reduce Blood Pressure    Did patient meet goals/outcomes: No    The following self management tools provided: blood pressure log    Patient Instructions (the written plan) was given to the patient/family.     Time spent with patient: 15 minutes     Will send a message to Dr. Rodriguez's staff regarding spleen biopsy  Follow up in 6 weeks

## 2017-11-28 NOTE — TELEPHONE ENCOUNTER
Hi,    I saw this patient for f/u today and she told me she was waiting on a call from surgery with regards to scheduling her spleen biopsy. Is there anything we can do to facilitate this?    Thanks,    Rita Queen PA-C

## 2017-11-28 NOTE — TELEPHONE ENCOUNTER
I spoke with patient, advised that I am waiting on intervention radiology at [facility] to call me back as to if they are able to do the biopsy or not.  As soon as I hear back and then speak with dr Rodriguez I will call back.

## 2017-11-30 ENCOUNTER — TELEPHONE (OUTPATIENT)
Dept: FAMILY MEDICINE | Facility: CLINIC | Age: 57
End: 2017-11-30

## 2017-12-03 DIAGNOSIS — Z79.4 TYPE 2 DIABETES MELLITUS WITH HYPERGLYCEMIA, WITH LONG-TERM CURRENT USE OF INSULIN: ICD-10-CM

## 2017-12-03 DIAGNOSIS — E11.65 TYPE 2 DIABETES MELLITUS WITH HYPERGLYCEMIA, WITH LONG-TERM CURRENT USE OF INSULIN: ICD-10-CM

## 2017-12-04 RX ORDER — INSULIN GLARGINE 100 [IU]/ML
INJECTION, SOLUTION SUBCUTANEOUS
Qty: 15 ML | Refills: 0 | Status: SHIPPED | OUTPATIENT
Start: 2017-12-04 | End: 2018-04-18

## 2017-12-06 ENCOUNTER — OFFICE VISIT (OUTPATIENT)
Dept: NEPHROLOGY | Facility: CLINIC | Age: 57
End: 2017-12-06
Payer: MEDICARE

## 2017-12-06 VITALS
HEART RATE: 62 BPM | SYSTOLIC BLOOD PRESSURE: 170 MMHG | BODY MASS INDEX: 27.76 KG/M2 | WEIGHT: 137.69 LBS | OXYGEN SATURATION: 98 % | HEIGHT: 59 IN | DIASTOLIC BLOOD PRESSURE: 98 MMHG

## 2017-12-06 DIAGNOSIS — J44.9 CHRONIC OBSTRUCTIVE PULMONARY DISEASE, UNSPECIFIED COPD TYPE: ICD-10-CM

## 2017-12-06 DIAGNOSIS — R80.9 PROTEINURIA, UNSPECIFIED TYPE: Primary | ICD-10-CM

## 2017-12-06 DIAGNOSIS — I10 HYPERTENSION, UNSPECIFIED TYPE: ICD-10-CM

## 2017-12-06 DIAGNOSIS — E11.65 TYPE 2 DIABETES MELLITUS WITH HYPERGLYCEMIA, WITH LONG-TERM CURRENT USE OF INSULIN: ICD-10-CM

## 2017-12-06 DIAGNOSIS — Z79.4 TYPE 2 DIABETES MELLITUS WITH HYPERGLYCEMIA, WITH LONG-TERM CURRENT USE OF INSULIN: ICD-10-CM

## 2017-12-06 PROCEDURE — 99499 UNLISTED E&M SERVICE: CPT | Mod: S$GLB,,, | Performed by: INTERNAL MEDICINE

## 2017-12-06 PROCEDURE — 99213 OFFICE O/P EST LOW 20 MIN: CPT | Mod: S$GLB,,, | Performed by: INTERNAL MEDICINE

## 2017-12-06 PROCEDURE — 99999 PR PBB SHADOW E&M-EST. PATIENT-LVL IV: CPT | Mod: PBBFAC,,, | Performed by: INTERNAL MEDICINE

## 2017-12-06 RX ORDER — SPIRONOLACTONE 25 MG/1
25 TABLET ORAL EVERY MORNING
Qty: 30 TABLET | Refills: 4 | Status: SHIPPED | OUTPATIENT
Start: 2017-12-06 | End: 2019-10-17

## 2017-12-06 NOTE — LETTER
December 14, 2017      Rita Queen PA-C  [address]  [facility] [address]           [facility] [address] [facility] [address] [place] [address]  Phone: [phone]          Patient: Steff Johnson   MR Number: [id number]   YOB: 1960   Date of Visit: 12/6/2017       Dear Rita Queen:    Thank you for referring Steff Johnson to me for evaluation. Attached you will find relevant portions of my assessment and plan of care.    If you have questions, please do not hesitate to call me. I look forward to following Steff Johnson along with you.    Sincerely,    Ibrahima Minaya  CC:  No Recipients    If you would like to receive this communication electronically, please contact [email or web address] or [phone] to request more information on [employer] Link access.    For providers and/or their staff who would like to refer a patient to [facility], please contact us through our one-stop-shop provider referral line, [facility], at [phone].    If you feel you have received this communication in error or would no longer like to receive these types of communications, please e-mail [email or web address]

## 2017-12-13 ENCOUNTER — CLINICAL SUPPORT (OUTPATIENT)
Dept: DIABETES | Facility: CLINIC | Age: 57
End: 2017-12-13
Payer: MEDICARE

## 2017-12-13 ENCOUNTER — HOSPITAL ENCOUNTER (OUTPATIENT)
Dept: RADIOLOGY | Facility: CLINIC | Age: 57
Discharge: HOME | End: 2017-12-13
Attending: PHYSICIAN ASSISTANT
Payer: MEDICARE

## 2017-12-13 DIAGNOSIS — Z79.4 TYPE 2 DIABETES MELLITUS WITH HYPERGLYCEMIA, WITH LONG-TERM CURRENT USE OF INSULIN: ICD-10-CM

## 2017-12-13 DIAGNOSIS — Z12.31 ENCOUNTER FOR SCREENING MAMMOGRAM FOR BREAST CANCER: ICD-10-CM

## 2017-12-13 DIAGNOSIS — E11.65 TYPE 2 DIABETES MELLITUS WITH HYPERGLYCEMIA, WITH LONG-TERM CURRENT USE OF INSULIN: ICD-10-CM

## 2017-12-13 PROCEDURE — 99999 PR PBB SHADOW E&M-EST. PATIENT-LVL III: CPT | Mod: PBBFAC,,,

## 2017-12-13 PROCEDURE — 77063 BREAST TOMOSYNTHESIS BI: CPT | Mod: 26,,, | Performed by: RADIOLOGY

## 2017-12-13 PROCEDURE — G0108 DIAB MANAGE TRN  PER INDIV: HCPCS | Mod: S$GLB,,, | Performed by: DIETITIAN, REGISTERED

## 2017-12-13 PROCEDURE — 77067 SCR MAMMO BI INCL CAD: CPT | Mod: 26,,, | Performed by: RADIOLOGY

## 2017-12-13 PROCEDURE — 77067 SCR MAMMO BI INCL CAD: CPT | Mod: TC,PO

## 2017-12-14 VITALS — HEIGHT: 59 IN | BODY MASS INDEX: 27.78 KG/M2 | WEIGHT: 137.81 LBS

## 2017-12-14 NOTE — PROGRESS NOTES
Diabetes Education  Author: Sabine Pa RD, CDE  Date: 12/14/2017    Diabetes Education Visit  Diabetes Education Record Assessment/Progress: Post Program/Follow-up    Diabetes Type  Diabetes Type : Type II    Diabetes History  Diabetes Diagnosis: 3-5 years    Nutrition  Meal Planning: drinks regular soda, eats out often (Patient has not made any changed to diet since last vist.  She continues to drink regular soft drink and lots of fast food.)  What type of beverages do you drink?: regular soda/tea    Monitoring   Monitoring: Other  Self Monitoring :  (Reports fasting 169-180 and bedtime 200-280)  Blood Glucose Logs: No    Exercise   Exercise Type:  (No exercise)    Current Diabetes Treatment   Current Treatment: Insulin, Oral Medication (She is not taking Metformin at present and is taking 33 Units of Lantus BID)    Social History  Preferred Learning Method: Face to Face  Primary Support: Self  Smoking Status: Never a Smoker  Alcohol Use: Never    Barriers to Change  Barriers to Change: None  Learning Challenges : None    Readiness to Learn   Readiness to Learn : Acceptance    Cultural Influences  Cultural Influences: None    Diabetes Education Assessment/Progress  Diabetes Disease Process (diabetes disease process and treatment options): Discussion  Nutrition (Incorporating nutritional management into one's lifestyle): Discussion, Instructed (Again instructed patient she must stop drinking beverages with sugar)  Physical Activity (incorporating physical activity into one's lifestyle): Discussion  Medications (states correct name, dose, onset, peak, duration, side effects & timing of meds): Discussion, Instructed (Instructed patient to restart Metformin and to take with food for better tolerance.)  Monitoring (monitoring blood glucose/other parameters & using results): Discussion  Acute Complications (preventing, detecting, and treating acute complications): Discussion  Chronic Complications (preventing,  detecting, and treating chronic complications): Discussion  Clinical (diabetes and other pertinent medical history): Discussion  Cognitive (knowledge of self-management skills, functional health literacy): Discussion  Psychosocial (emotional response to diabetes): Discussion  Diabetes Distress and Support Systems: Discussion  Behavioral (readiness for change, lifestyle practices, self-care behaviors): Discussion    Goals  Patient has selected/evaluated goals during today's session: Yes, selected  Healthy Eating: Set  Start Date: 12/13/17  Target Date: 03/13/18  Physical Activity: In Progress  Monitoring: In Progress  Medications: Set  Start Date: 12/13/17  Target Date: 03/13/18  Problem Solving: In Progress  Healthy Coping: In Progress  Reducing Risks: In Progress         Diabetes Care Plan/Intervention  Education Plan/Intervention: Individual Follow-Up DSMT    Diabetes Meal Plan  Restrictions: Restricted Carbohydrate (Stop drinking beverages with sugar)  Calories: 1500  Carbohydrate Per Meal: 30-45g  Carbohydrate Per Snack : 7-15g  Fat:  (Reduce intake at saturated fats)  Protein:  (Lean protein with meals and snacks)    Education Units of Time   Time Spent: 30 min      Health Maintenance Topics with due status: Not Due       Topic Last Completion Date    Pneumococcal PPSV23 (Medium Risk) 01/27/2016    Colonoscopy 04/11/2017    Lipid Panel 08/15/2017    Hemoglobin A1c 11/28/2017     Health Maintenance Due   Topic Date Due    TETANUS VACCINE  01/08/1976    Eye Exam  08/01/2017    Mammogram  09/25/2017    Foot Exam  02/14/2018

## 2017-12-26 ENCOUNTER — TELEPHONE (OUTPATIENT)
Dept: NEPHROLOGY | Facility: CLINIC | Age: 57
End: 2017-12-26

## 2017-12-26 NOTE — PROGRESS NOTES
Subjective:       Patient ID: Steff Johnson is a 57 y.o. Black or  female who presents for new evaluation of Proteinuria    jkj     She is referred by her PCP for proteinuria.   She has a significant history of DM and HTN, last Hba1c was 8.5 which improved from 11 previously. She denies foamy urine, no hematuria and no frequent UTIs. She follows a low sodium diet but admits she does not ensure hydration (dislikes water) Occasional NSAID use but no herbal medications. She was a premature infant. She received PRBCs before 1972. No known kidney disease in her family    Interval history: She reports she is doing well. She's having some HAs, PCP not aware yet. Her BP has been running high at home and at other providers visits. Last Hba1c is 9 from August 2017. No foamy urine    Review of Systems   Constitutional: Negative for activity change, appetite change, fatigue and unexpected weight change.   HENT: Negative for facial swelling.    Respiratory: Negative for shortness of breath.    Cardiovascular: Negative for chest pain and leg swelling.   Gastrointestinal: Positive for constipation. Negative for diarrhea.   Genitourinary: Negative for difficulty urinating, dysuria, flank pain and hematuria.   Musculoskeletal: Positive for arthralgias and back pain.   Neurological: Negative for weakness and headaches.       Objective:      Physical Exam   Constitutional: She is oriented to person, place, and time. She appears well-nourished. No distress.   HENT:   Mouth/Throat: Oropharynx is clear and moist.   Neck: No JVD present.   Cardiovascular: Regular rhythm, S1 normal and S2 normal.  Exam reveals no friction rub.    Pulmonary/Chest: Breath sounds normal. She has no wheezes. She has no rales.   Abdominal: Soft.   Musculoskeletal: She exhibits no edema.   Neurological: She is alert and oriented to person, place, and time.   Skin: Skin is warm and dry.   Psychiatric: She has a normal mood and affect.   Vitals  reviewed.      Assessment:       1. Proteinuria, unspecified type    2. Hypertension, unspecified type    3. Type 2 diabetes mellitus with hyperglycemia, with long-term current use of insulin    4. Chronic obstructive pulmonary disease, unspecified COPD type        Plan:           DM nephropathy. Continue RAAS blockade (valsartan) for renal preservation    HTN--stop thiazide and add aldactone. BP log in 2 weeks      RTC 6 months with labs prior

## 2017-12-27 NOTE — TELEPHONE ENCOUNTER
"Reports "yes m'am, I'm doing better"  Usually doesn't get higher than 140 on the top and 99- 110 on the bottom number. When asked how she is feeling she replied "Feeling alright."  "

## 2018-01-09 ENCOUNTER — TELEPHONE (OUTPATIENT)
Dept: ORTHOPEDICS | Facility: CLINIC | Age: 58
End: 2018-01-09

## 2018-01-09 NOTE — TELEPHONE ENCOUNTER
----- Message from Susan Duran sent at 1/9/2018 11:52 AM CST -----  Contact: Self  Pt is requesting a call back re: rescheduling Sx.

## 2018-01-17 ENCOUNTER — DOCUMENTATION ONLY (OUTPATIENT)
Dept: FAMILY MEDICINE | Facility: CLINIC | Age: 58
End: 2018-01-17

## 2018-01-17 NOTE — PROGRESS NOTES
Pre-Visit Chart Review  For Appointment Scheduled on 1-18-18    Health Maintenance Due   Topic Date Due    TETANUS VACCINE  01/08/1976    Eye Exam  08/01/2017    Zoster Vaccine  01/08/2018    Foot Exam  02/14/2018

## 2018-01-19 DIAGNOSIS — E11.8 TYPE 2 DIABETES MELLITUS WITH COMPLICATION, WITH LONG-TERM CURRENT USE OF INSULIN: Primary | ICD-10-CM

## 2018-01-19 DIAGNOSIS — Z79.4 TYPE 2 DIABETES MELLITUS WITH COMPLICATION, WITH LONG-TERM CURRENT USE OF INSULIN: Primary | ICD-10-CM

## 2018-01-23 ENCOUNTER — OUTPATIENT CASE MANAGEMENT (OUTPATIENT)
Dept: ADMINISTRATIVE | Facility: OTHER | Age: 58
End: 2018-01-23

## 2018-01-23 NOTE — PROGRESS NOTES
Please note the following patient's information has been forwarded to [facility] ([facility]) for case mgmt or  by Outpatient Case Management.    Please see the media section of patient's chart for additional details.    Please contact Ext. [phone] with any questions.    Thank you,    Marce Platt, [facility]  Outpatient Complex Care Mgmt  Ext [phone]

## 2018-01-24 ENCOUNTER — DOCUMENTATION ONLY (OUTPATIENT)
Dept: FAMILY MEDICINE | Facility: CLINIC | Age: 58
End: 2018-01-24

## 2018-01-24 ENCOUNTER — OFFICE VISIT (OUTPATIENT)
Dept: FAMILY MEDICINE | Facility: CLINIC | Age: 58
End: 2018-01-24
Payer: MEDICARE

## 2018-01-24 VITALS
HEIGHT: 59 IN | WEIGHT: 140.88 LBS | HEART RATE: 70 BPM | TEMPERATURE: 98 F | BODY MASS INDEX: 28.4 KG/M2 | SYSTOLIC BLOOD PRESSURE: 166 MMHG | DIASTOLIC BLOOD PRESSURE: 92 MMHG

## 2018-01-24 DIAGNOSIS — E11.69 HYPERLIPIDEMIA ASSOCIATED WITH TYPE 2 DIABETES MELLITUS: ICD-10-CM

## 2018-01-24 DIAGNOSIS — E11.65 TYPE 2 DIABETES MELLITUS WITH HYPERGLYCEMIA, WITH LONG-TERM CURRENT USE OF INSULIN: ICD-10-CM

## 2018-01-24 DIAGNOSIS — B35.4 TINEA CORPORIS: Primary | ICD-10-CM

## 2018-01-24 DIAGNOSIS — E11.22 TYPE 2 DIABETES MELLITUS WITH CHRONIC KIDNEY DISEASE, WITH LONG-TERM CURRENT USE OF INSULIN, UNSPECIFIED CKD STAGE: ICD-10-CM

## 2018-01-24 DIAGNOSIS — Z79.4 TYPE 2 DIABETES MELLITUS WITH CHRONIC KIDNEY DISEASE, WITH LONG-TERM CURRENT USE OF INSULIN, UNSPECIFIED CKD STAGE: ICD-10-CM

## 2018-01-24 DIAGNOSIS — I10 HYPERTENSION, UNCONTROLLED: ICD-10-CM

## 2018-01-24 DIAGNOSIS — Z79.4 TYPE 2 DIABETES MELLITUS WITH HYPERGLYCEMIA, WITH LONG-TERM CURRENT USE OF INSULIN: ICD-10-CM

## 2018-01-24 DIAGNOSIS — E78.5 HYPERLIPIDEMIA ASSOCIATED WITH TYPE 2 DIABETES MELLITUS: ICD-10-CM

## 2018-01-24 DIAGNOSIS — I10 HYPERTENSION, UNSPECIFIED TYPE: ICD-10-CM

## 2018-01-24 DIAGNOSIS — I10 UNCONTROLLED HYPERTENSION: ICD-10-CM

## 2018-01-24 PROCEDURE — 99499 UNLISTED E&M SERVICE: CPT | Mod: S$GLB,,, | Performed by: FAMILY MEDICINE

## 2018-01-24 PROCEDURE — 99999 PR PBB SHADOW E&M-EST. PATIENT-LVL III: CPT | Mod: PBBFAC,,, | Performed by: FAMILY MEDICINE

## 2018-01-24 PROCEDURE — 3008F BODY MASS INDEX DOCD: CPT | Mod: S$GLB,,, | Performed by: FAMILY MEDICINE

## 2018-01-24 PROCEDURE — 99214 OFFICE O/P EST MOD 30 MIN: CPT | Mod: S$GLB,,, | Performed by: FAMILY MEDICINE

## 2018-01-24 RX ORDER — AMLODIPINE AND VALSARTAN 10; 320 MG/1; MG/1
1 TABLET ORAL DAILY
Qty: 90 TABLET | Refills: 3 | Status: SHIPPED | OUTPATIENT
Start: 2018-01-24 | End: 2019-10-17 | Stop reason: SDUPTHER

## 2018-01-24 RX ORDER — METOPROLOL SUCCINATE 200 MG/1
200 TABLET, EXTENDED RELEASE ORAL DAILY
Qty: 90 TABLET | Refills: 3 | Status: SHIPPED | OUTPATIENT
Start: 2018-01-24 | End: 2018-12-17

## 2018-01-24 RX ORDER — CLOTRIMAZOLE AND BETAMETHASONE DIPROPIONATE 10; .64 MG/G; MG/G
CREAM TOPICAL 2 TIMES DAILY
Qty: 15 G | Refills: 0 | Status: SHIPPED | OUTPATIENT
Start: 2018-01-24 | End: 2019-12-12

## 2018-01-24 RX ORDER — METFORMIN HYDROCHLORIDE 500 MG/1
500 TABLET, EXTENDED RELEASE ORAL 2 TIMES DAILY WITH MEALS
Qty: 180 TABLET | Refills: 3 | Status: SHIPPED | OUTPATIENT
Start: 2018-01-24 | End: 2018-07-19

## 2018-01-24 NOTE — PROGRESS NOTES
Pre-Visit Chart Review  For Appointment Scheduled on 1-24-18    Health Maintenance Due   Topic Date Due    TETANUS VACCINE  01/08/1976    Eye Exam  08/01/2017    Zoster Vaccine  01/08/2018    Foot Exam  02/14/2018

## 2018-01-31 ENCOUNTER — LAB VISIT (OUTPATIENT)
Dept: LAB | Facility: HOSPITAL | Age: 58
End: 2018-01-31
Attending: FAMILY MEDICINE
Payer: MEDICARE

## 2018-01-31 DIAGNOSIS — Z79.4 TYPE 2 DIABETES MELLITUS WITH CHRONIC KIDNEY DISEASE, WITH LONG-TERM CURRENT USE OF INSULIN, UNSPECIFIED CKD STAGE: ICD-10-CM

## 2018-01-31 DIAGNOSIS — E78.5 HYPERLIPIDEMIA ASSOCIATED WITH TYPE 2 DIABETES MELLITUS: ICD-10-CM

## 2018-01-31 DIAGNOSIS — E11.22 TYPE 2 DIABETES MELLITUS WITH CHRONIC KIDNEY DISEASE, WITH LONG-TERM CURRENT USE OF INSULIN, UNSPECIFIED CKD STAGE: ICD-10-CM

## 2018-01-31 DIAGNOSIS — E11.69 HYPERLIPIDEMIA ASSOCIATED WITH TYPE 2 DIABETES MELLITUS: ICD-10-CM

## 2018-01-31 LAB
ALBUMIN SERPL BCP-MCNC: 3.3 G/DL
ALP SERPL-CCNC: 108 U/L
ALT SERPL W/O P-5'-P-CCNC: 19 U/L
ANION GAP SERPL CALC-SCNC: 10 MMOL/L
AST SERPL-CCNC: 21 U/L
BASOPHILS # BLD AUTO: 0.07 K/UL
BASOPHILS NFR BLD: 0.8 %
BILIRUB SERPL-MCNC: 0.6 MG/DL
BUN SERPL-MCNC: 14 MG/DL
CALCIUM SERPL-MCNC: 8.9 MG/DL
CHLORIDE SERPL-SCNC: 102 MMOL/L
CHOLEST SERPL-MCNC: 164 MG/DL
CHOLEST/HDLC SERPL: 2.6 {RATIO}
CO2 SERPL-SCNC: 24 MMOL/L
CREAT SERPL-MCNC: 1 MG/DL
DIFFERENTIAL METHOD: ABNORMAL
EOSINOPHIL # BLD AUTO: 0.2 K/UL
EOSINOPHIL NFR BLD: 1.8 %
ERYTHROCYTE [DISTWIDTH] IN BLOOD BY AUTOMATED COUNT: 13.5 %
EST. GFR  (AFRICAN AMERICAN): >60 ML/MIN/1.73 M^2
EST. GFR  (NON AFRICAN AMERICAN): >60 ML/MIN/1.73 M^2
ESTIMATED AVG GLUCOSE: 249 MG/DL
GLUCOSE SERPL-MCNC: 251 MG/DL
HBA1C MFR BLD HPLC: 10.3 %
HCT VFR BLD AUTO: 35.5 %
HDLC SERPL-MCNC: 63 MG/DL
HDLC SERPL: 38.4 %
HGB BLD-MCNC: 12.2 G/DL
IMM GRANULOCYTES # BLD AUTO: 0.07 K/UL
IMM GRANULOCYTES NFR BLD AUTO: 0.8 %
LDLC SERPL CALC-MCNC: 89 MG/DL
LYMPHOCYTES # BLD AUTO: 2.4 K/UL
LYMPHOCYTES NFR BLD: 26.2 %
MCH RBC QN AUTO: 26.8 PG
MCHC RBC AUTO-ENTMCNC: 34.4 G/DL
MCV RBC AUTO: 78 FL
MONOCYTES # BLD AUTO: 0.7 K/UL
MONOCYTES NFR BLD: 7.8 %
NEUTROPHILS # BLD AUTO: 5.8 K/UL
NEUTROPHILS NFR BLD: 62.6 %
NONHDLC SERPL-MCNC: 101 MG/DL
NRBC BLD-RTO: 0 /100 WBC
PLATELET # BLD AUTO: 407 K/UL
PMV BLD AUTO: 10.7 FL
POTASSIUM SERPL-SCNC: 4.4 MMOL/L
PROT SERPL-MCNC: 7.7 G/DL
RBC # BLD AUTO: 4.55 M/UL
SODIUM SERPL-SCNC: 136 MMOL/L
TRIGL SERPL-MCNC: 60 MG/DL
WBC # BLD AUTO: 9.23 K/UL

## 2018-01-31 PROCEDURE — 83036 HEMOGLOBIN GLYCOSYLATED A1C: CPT

## 2018-01-31 PROCEDURE — 80061 LIPID PANEL: CPT

## 2018-01-31 PROCEDURE — 36415 COLL VENOUS BLD VENIPUNCTURE: CPT | Mod: PO

## 2018-01-31 PROCEDURE — 80053 COMPREHEN METABOLIC PANEL: CPT

## 2018-01-31 PROCEDURE — 85025 COMPLETE CBC W/AUTO DIFF WBC: CPT

## 2018-02-04 NOTE — PROGRESS NOTES
Subjective:       Patient ID: Steff Johnson is a 58 y.o. female.    Chief Complaint: Follow-up    Patient Active Problem List   Diagnosis    Hypertension    COPD (chronic obstructive pulmonary disease)    Type 2 diabetes mellitus with hyperglycemia, with long-term current use of insulin    Proteinuria    Complete tear of left rotator cuff   Dm-taking 33 lantus bid.  BS highest lately 159.  Metformin 500mg 2 bid causes diarrhea.  Had eye exam 2 months ago at [facility] on [address]    Has skin rash on left medial foream 1.5 cm ring like  HPI  Review of Systems   Constitutional: Negative for fatigue and unexpected weight change.   Respiratory: Negative for chest tightness and shortness of breath.    Cardiovascular: Negative for chest pain, palpitations and leg swelling.   Gastrointestinal: Negative for abdominal pain.   Musculoskeletal: Negative for arthralgias.   Skin: Positive for rash.   Neurological: Negative for dizziness, syncope, light-headedness and headaches.       Objective:      Physical Exam   Constitutional: She is oriented to person, place, and time. She appears well-developed and well-nourished.   Cardiovascular: Normal rate, regular rhythm and normal heart sounds.    Pulmonary/Chest: Effort normal and breath sounds normal.   Musculoskeletal: She exhibits no edema.   Neurological: She is alert and oriented to person, place, and time.   Skin: Skin is warm and dry.        Psychiatric: She has a normal mood and affect.   Nursing note and vitals reviewed.      Assessment:       1. Tinea corporis    2. Type 2 diabetes mellitus with chronic kidney disease, with long-term current use of insulin, unspecified CKD stage    3. Uncontrolled hypertension    4. Hypertension, uncontrolled    5. Hypertension, unspecified type    6. Type 2 diabetes mellitus with hyperglycemia, with long-term current use of insulin    7. Hyperlipidemia associated with type 2 diabetes mellitus        Plan:       1. Type 2  diabetes mellitus with chronic kidney disease, with long-term current use of insulin, unspecified CKD stage  Stable condition.  Continue current medications.  Will adjust based on lab findings or if condition changes.  Decrease to  - metFORMIN (GLUCOPHAGE-XR) 500 MG 24 hr tablet; Take 1 tablet (500 mg total) by mouth 2 (two) times daily with meals. 2 TABLETS TWICE DAILY WITH MEALS- Pt taking 1 tablet twice a day  Dispense: 180 tablet; Refill: 3  - Comprehensive metabolic panel; Future  - CBC auto differential; Future  - Hemoglobin A1c; Future    2. Uncontrolled hypertension  Increase tp  - metoprolol succinate (TOPROL-XL) 200 MG 24 hr tablet; Take 1 tablet (200 mg total) by mouth once daily.  Dispense: 90 tablet; Refill: 3    3. Hypertension, uncontrolled  I counseled the patient on HTN education, management and recommendations.  I recommended weight loss toward a BMI < 25, avoidance of salt and the DASH diet, regular cardio exercise a minimum of 150 minutes per week and medications if indicated.  Printed materials were given.    - amlodipine-valsartan (EXFORGE)  mg per tablet; Take 1 tablet by mouth once daily.  Dispense: 90 tablet; Refill: 3    4. Tinea corporis  Apply as directed x 4 weeks  - clotrimazole-betamethasone 1-0.05% (LOTRISONE) cream; Apply topically 2 (two) times daily.  Dispense: 15 g; Refill: 0    5. Hypertension, unspecified type  Increase to     6. Type 2 diabetes mellitus with hyperglycemia, with long-term current use of insulin  Stable condition.  Continue current medications.  Will adjust based on lab findings or if condition changes.,ed    7. Hyperlipidemia associated with type 2 diabetes mellitus  Stable condition.  Continue current medications.  Will adjust based on lab findings or if condition changes.    - Lipid panel; Future'  [facility] goal documentation:  Patient readiness: acceptance and barriers:readiness  During the course of the visit the patient was educated and counseled about  the following: Diabetes:  Discussed general issues about diabetes pathophysiology and management.  Hypertension:   Dietary sodium restriction.  Regular aerobic exercise.  Goals: Diabetes: Maintain Hemoglobin A1C below 7 and Hypertension: Reduce Blood Pressure  Goal/Outcomes met:Hypertension and type 2 DM  The following self management tools provided:none  Patient Instructions (the written plan) was given to the patient/family: Yes  Time spent with patient: 20 minutes    Patient with be reevaluated in 3 months or sooner prn    Greater than 50% of this visit was spent counseling as described in above documentation:Yes

## 2018-02-16 RX ORDER — INSULIN GLARGINE 100 [IU]/ML
INJECTION, SOLUTION SUBCUTANEOUS
Qty: 15 ML | Refills: 5 | Status: SHIPPED | OUTPATIENT
Start: 2018-02-16 | End: 2018-03-09 | Stop reason: DRUGHIGH

## 2018-03-09 ENCOUNTER — HOSPITAL ENCOUNTER (OUTPATIENT)
Dept: PREADMISSION TESTING | Facility: HOSPITAL | Age: 58
Discharge: HOME | End: 2018-03-09
Attending: ORTHOPAEDIC SURGERY
Payer: MEDICARE

## 2018-03-09 VITALS — WEIGHT: 140 LBS | BODY MASS INDEX: 28.22 KG/M2 | HEIGHT: 59 IN

## 2018-03-09 DIAGNOSIS — M75.122 COMPLETE TEAR OF LEFT ROTATOR CUFF: Primary | ICD-10-CM

## 2018-03-09 DIAGNOSIS — Z01.818 PRE-OP EXAM: ICD-10-CM

## 2018-03-09 LAB
ANION GAP SERPL CALC-SCNC: 12 MMOL/L
BASOPHILS # BLD AUTO: 0 K/UL
BASOPHILS NFR BLD: 0.5 %
BUN SERPL-MCNC: 18 MG/DL
CALCIUM SERPL-MCNC: 9.3 MG/DL
CHLORIDE SERPL-SCNC: 100 MMOL/L
CO2 SERPL-SCNC: 25 MMOL/L
CREAT SERPL-MCNC: 1.3 MG/DL
DIFFERENTIAL METHOD: ABNORMAL
EOSINOPHIL # BLD AUTO: 0.1 K/UL
EOSINOPHIL NFR BLD: 1.6 %
ERYTHROCYTE [DISTWIDTH] IN BLOOD BY AUTOMATED COUNT: 14.9 %
EST. GFR  (AFRICAN AMERICAN): 52 ML/MIN/1.73 M^2
EST. GFR  (NON AFRICAN AMERICAN): 45 ML/MIN/1.73 M^2
GLUCOSE SERPL-MCNC: 410 MG/DL
HCT VFR BLD AUTO: 37.5 %
HGB BLD-MCNC: 12.6 G/DL
LYMPHOCYTES # BLD AUTO: 1.9 K/UL
LYMPHOCYTES NFR BLD: 22.3 %
MCH RBC QN AUTO: 26.8 PG
MCHC RBC AUTO-ENTMCNC: 33.5 G/DL
MCV RBC AUTO: 80 FL
MONOCYTES # BLD AUTO: 0.6 K/UL
MONOCYTES NFR BLD: 7 %
NEUTROPHILS # BLD AUTO: 5.8 K/UL
NEUTROPHILS NFR BLD: 68.6 %
PLATELET # BLD AUTO: 321 K/UL
PMV BLD AUTO: 8.8 FL
POTASSIUM SERPL-SCNC: 4.3 MMOL/L
RBC # BLD AUTO: 4.7 M/UL
SODIUM SERPL-SCNC: 137 MMOL/L
WBC # BLD AUTO: 8.4 K/UL

## 2018-03-09 PROCEDURE — 80048 BASIC METABOLIC PNL TOTAL CA: CPT

## 2018-03-09 PROCEDURE — 93005 ELECTROCARDIOGRAM TRACING: CPT

## 2018-03-09 PROCEDURE — 93010 ELECTROCARDIOGRAM REPORT: CPT | Mod: ,,, | Performed by: INTERNAL MEDICINE

## 2018-03-09 PROCEDURE — 99900103 DSU ONLY-NO CHARGE-INITIAL HR (STAT)

## 2018-03-09 PROCEDURE — 36415 COLL VENOUS BLD VENIPUNCTURE: CPT

## 2018-03-09 PROCEDURE — 85025 COMPLETE CBC W/AUTO DIFF WBC: CPT

## 2018-03-14 ENCOUNTER — ANESTHESIA EVENT (OUTPATIENT)
Dept: SURGERY | Facility: HOSPITAL | Age: 58
End: 2018-03-14
Payer: MEDICARE

## 2018-03-16 ENCOUNTER — ANESTHESIA (OUTPATIENT)
Dept: SURGERY | Facility: HOSPITAL | Age: 58
End: 2018-03-16
Payer: MEDICARE

## 2018-03-16 ENCOUNTER — HOSPITAL ENCOUNTER (OUTPATIENT)
Facility: HOSPITAL | Age: 58
Discharge: HOME | End: 2018-03-16
Attending: ORTHOPAEDIC SURGERY | Admitting: ORTHOPAEDIC SURGERY
Payer: MEDICARE

## 2018-03-16 DIAGNOSIS — M75.122 COMPLETE TEAR OF LEFT ROTATOR CUFF: ICD-10-CM

## 2018-03-16 LAB — POCT GLUCOSE: 135 MG/DL (ref 70–110)

## 2018-03-16 PROCEDURE — D9220A PRA ANESTHESIA: Mod: ANES,,, | Performed by: ANESTHESIOLOGY

## 2018-03-16 PROCEDURE — 63600175 PHARM REV CODE 636 W HCPCS: Performed by: ORTHOPAEDIC SURGERY

## 2018-03-16 PROCEDURE — 29824 SHO ARTHRS SRG DSTL CLAVICLC: CPT | Mod: 51,LT,, | Performed by: ORTHOPAEDIC SURGERY

## 2018-03-16 PROCEDURE — 64416 NJX AA&/STRD BRCH PL NFS IMG: CPT | Performed by: ANESTHESIOLOGY

## 2018-03-16 PROCEDURE — 71000016 HC POSTOP RECOV ADDL HR: Performed by: ORTHOPAEDIC SURGERY

## 2018-03-16 PROCEDURE — 37000009 HC ANESTHESIA EA ADD 15 MINS: Performed by: ORTHOPAEDIC SURGERY

## 2018-03-16 PROCEDURE — 25000003 PHARM REV CODE 250: Performed by: NURSE ANESTHETIST, CERTIFIED REGISTERED

## 2018-03-16 PROCEDURE — 64416 NJX AA&/STRD BRCH PL NFS IMG: CPT | Mod: 59,LT,, | Performed by: ANESTHESIOLOGY

## 2018-03-16 PROCEDURE — 71000039 HC RECOVERY, EACH ADD'L HOUR: Performed by: ORTHOPAEDIC SURGERY

## 2018-03-16 PROCEDURE — 99900104 DSU ONLY-NO CHARGE-EA ADD'L HR (STAT): Performed by: ORTHOPAEDIC SURGERY

## 2018-03-16 PROCEDURE — 36000711: Performed by: ORTHOPAEDIC SURGERY

## 2018-03-16 PROCEDURE — 63600175 PHARM REV CODE 636 W HCPCS: Performed by: NURSE ANESTHETIST, CERTIFIED REGISTERED

## 2018-03-16 PROCEDURE — D9220A PRA ANESTHESIA: Mod: CRNA,,, | Performed by: NURSE ANESTHETIST, CERTIFIED REGISTERED

## 2018-03-16 PROCEDURE — 25000003 PHARM REV CODE 250: Performed by: ANESTHESIOLOGY

## 2018-03-16 PROCEDURE — 37000008 HC ANESTHESIA 1ST 15 MINUTES: Performed by: ORTHOPAEDIC SURGERY

## 2018-03-16 PROCEDURE — 99900103 DSU ONLY-NO CHARGE-INITIAL HR (STAT): Performed by: ORTHOPAEDIC SURGERY

## 2018-03-16 PROCEDURE — C2626 INFUSION PUMP, NON-PROG,TEMP: HCPCS | Performed by: ANESTHESIOLOGY

## 2018-03-16 PROCEDURE — 76942 ECHO GUIDE FOR BIOPSY: CPT | Performed by: ANESTHESIOLOGY

## 2018-03-16 PROCEDURE — 36000710: Performed by: ORTHOPAEDIC SURGERY

## 2018-03-16 PROCEDURE — 63600175 PHARM REV CODE 636 W HCPCS: Performed by: ANESTHESIOLOGY

## 2018-03-16 PROCEDURE — 76942 ECHO GUIDE FOR BIOPSY: CPT | Mod: 26,,, | Performed by: ANESTHESIOLOGY

## 2018-03-16 PROCEDURE — 29827 SHO ARTHRS SRG RT8TR CUF RPR: CPT | Mod: LT,,, | Performed by: ORTHOPAEDIC SURGERY

## 2018-03-16 PROCEDURE — 27201423 OPTIME MED/SURG SUP & DEVICES STERILE SUPPLY: Performed by: ORTHOPAEDIC SURGERY

## 2018-03-16 PROCEDURE — 63600175 PHARM REV CODE 636 W HCPCS

## 2018-03-16 PROCEDURE — C1713 ANCHOR/SCREW BN/BN,TIS/BN: HCPCS | Performed by: ORTHOPAEDIC SURGERY

## 2018-03-16 PROCEDURE — 71000033 HC RECOVERY, INTIAL HOUR: Performed by: ORTHOPAEDIC SURGERY

## 2018-03-16 PROCEDURE — 71000015 HC POSTOP RECOV 1ST HR: Performed by: ORTHOPAEDIC SURGERY

## 2018-03-16 PROCEDURE — 29826 SHO ARTHRS SRG DECOMPRESSION: CPT | Mod: LT,,, | Performed by: ORTHOPAEDIC SURGERY

## 2018-03-16 DEVICE — ANCHOR BIOCOMP SWVLLOK: Type: IMPLANTABLE DEVICE | Site: SHOULDER | Status: FUNCTIONAL

## 2018-03-16 DEVICE — ANCHOR SU BC CORKSCREW 5.5MM: Type: IMPLANTABLE DEVICE | Site: SHOULDER | Status: FUNCTIONAL

## 2018-03-16 RX ORDER — HYDROMORPHONE HYDROCHLORIDE 2 MG/ML
0.2 INJECTION, SOLUTION INTRAMUSCULAR; INTRAVENOUS; SUBCUTANEOUS EVERY 5 MIN PRN
Status: DISCONTINUED | OUTPATIENT
Start: 2018-03-16 | End: 2018-03-16 | Stop reason: HOSPADM

## 2018-03-16 RX ORDER — DEXAMETHASONE SODIUM PHOSPHATE 4 MG/ML
INJECTION, SOLUTION INTRA-ARTICULAR; INTRALESIONAL; INTRAMUSCULAR; INTRAVENOUS; SOFT TISSUE
Status: DISCONTINUED | OUTPATIENT
Start: 2018-03-16 | End: 2018-03-16

## 2018-03-16 RX ORDER — DIPHENHYDRAMINE HYDROCHLORIDE 50 MG/ML
25 INJECTION INTRAMUSCULAR; INTRAVENOUS EVERY 6 HOURS PRN
Status: DISCONTINUED | OUTPATIENT
Start: 2018-03-16 | End: 2018-03-16 | Stop reason: HOSPADM

## 2018-03-16 RX ORDER — EPINEPHRINE 1 MG/ML
INJECTION, SOLUTION INTRACARDIAC; INTRAMUSCULAR; INTRAVENOUS; SUBCUTANEOUS
Status: DISCONTINUED | OUTPATIENT
Start: 2018-03-16 | End: 2018-03-16 | Stop reason: HOSPADM

## 2018-03-16 RX ORDER — ACETAMINOPHEN 10 MG/ML
INJECTION, SOLUTION INTRAVENOUS
Status: DISCONTINUED | OUTPATIENT
Start: 2018-03-16 | End: 2018-03-16

## 2018-03-16 RX ORDER — ONDANSETRON 2 MG/ML
4 INJECTION INTRAMUSCULAR; INTRAVENOUS ONCE
Status: DISCONTINUED | OUTPATIENT
Start: 2018-03-16 | End: 2018-03-16 | Stop reason: HOSPADM

## 2018-03-16 RX ORDER — NEOSTIGMINE METHYLSULFATE 1 MG/ML
INJECTION, SOLUTION INTRAVENOUS
Status: DISCONTINUED | OUTPATIENT
Start: 2018-03-16 | End: 2018-03-16

## 2018-03-16 RX ORDER — CEFAZOLIN SODIUM 2 G/50ML
2 SOLUTION INTRAVENOUS
Status: COMPLETED | OUTPATIENT
Start: 2018-03-16 | End: 2018-03-16

## 2018-03-16 RX ORDER — FENTANYL CITRATE 50 UG/ML
25 INJECTION, SOLUTION INTRAMUSCULAR; INTRAVENOUS EVERY 5 MIN PRN
Status: DISCONTINUED | OUTPATIENT
Start: 2018-03-16 | End: 2018-03-16 | Stop reason: HOSPADM

## 2018-03-16 RX ORDER — SODIUM CHLORIDE 0.9 % (FLUSH) 0.9 %
5 SYRINGE (ML) INJECTION
Status: DISCONTINUED | OUTPATIENT
Start: 2018-03-16 | End: 2018-03-16 | Stop reason: HOSPADM

## 2018-03-16 RX ORDER — LIDOCAINE HCL/PF 100 MG/5ML
SYRINGE (ML) INTRAVENOUS
Status: DISCONTINUED | OUTPATIENT
Start: 2018-03-16 | End: 2018-03-16

## 2018-03-16 RX ORDER — OXYCODONE HYDROCHLORIDE 5 MG/1
5 TABLET ORAL ONCE AS NEEDED
Status: DISCONTINUED | OUTPATIENT
Start: 2018-03-17 | End: 2018-03-16 | Stop reason: HOSPADM

## 2018-03-16 RX ORDER — OXYCODONE AND ACETAMINOPHEN 5; 325 MG/1; MG/1
1 TABLET ORAL EVERY 6 HOURS PRN
Qty: 40 TABLET | Refills: 0 | Status: SHIPPED | OUTPATIENT
Start: 2018-03-16 | End: 2018-03-26 | Stop reason: SDUPTHER

## 2018-03-16 RX ORDER — MEPERIDINE HYDROCHLORIDE 25 MG/ML
12.5 INJECTION INTRAMUSCULAR; INTRAVENOUS; SUBCUTANEOUS ONCE AS NEEDED
Status: DISCONTINUED | OUTPATIENT
Start: 2018-03-16 | End: 2018-03-16 | Stop reason: HOSPADM

## 2018-03-16 RX ORDER — ROCURONIUM BROMIDE 10 MG/ML
INJECTION, SOLUTION INTRAVENOUS
Status: DISCONTINUED | OUTPATIENT
Start: 2018-03-16 | End: 2018-03-16

## 2018-03-16 RX ORDER — FENTANYL CITRATE 50 UG/ML
INJECTION, SOLUTION INTRAMUSCULAR; INTRAVENOUS
Status: DISCONTINUED | OUTPATIENT
Start: 2018-03-16 | End: 2018-03-16

## 2018-03-16 RX ORDER — SODIUM CHLORIDE 0.9 % (FLUSH) 0.9 %
3 SYRINGE (ML) INJECTION
Status: DISCONTINUED | OUTPATIENT
Start: 2018-03-16 | End: 2018-03-16 | Stop reason: HOSPADM

## 2018-03-16 RX ORDER — PROPOFOL 10 MG/ML
VIAL (ML) INTRAVENOUS
Status: DISCONTINUED | OUTPATIENT
Start: 2018-03-16 | End: 2018-03-16

## 2018-03-16 RX ORDER — MIDAZOLAM HYDROCHLORIDE 1 MG/ML
INJECTION, SOLUTION INTRAMUSCULAR; INTRAVENOUS
Status: DISCONTINUED | OUTPATIENT
Start: 2018-03-16 | End: 2018-03-16

## 2018-03-16 RX ORDER — ONDANSETRON HYDROCHLORIDE 2 MG/ML
INJECTION, SOLUTION INTRAMUSCULAR; INTRAVENOUS
Status: DISCONTINUED | OUTPATIENT
Start: 2018-03-16 | End: 2018-03-16

## 2018-03-16 RX ORDER — LIDOCAINE HYDROCHLORIDE 10 MG/ML
5 INJECTION, SOLUTION EPIDURAL; INFILTRATION; INTRACAUDAL; PERINEURAL ONCE
Status: COMPLETED | OUTPATIENT
Start: 2018-03-16 | End: 2018-03-16

## 2018-03-16 RX ORDER — HYDROCODONE BITARTRATE AND ACETAMINOPHEN 5; 325 MG/1; MG/1
1 TABLET ORAL EVERY 4 HOURS PRN
Status: CANCELLED | OUTPATIENT
Start: 2018-03-16

## 2018-03-16 RX ORDER — PROMETHAZINE HYDROCHLORIDE 25 MG/1
25 TABLET ORAL EVERY 8 HOURS PRN
Qty: 30 TABLET | Refills: 0 | Status: SHIPPED | OUTPATIENT
Start: 2018-03-16 | End: 2018-06-12

## 2018-03-16 RX ORDER — GLYCOPYRROLATE 0.2 MG/ML
INJECTION INTRAMUSCULAR; INTRAVENOUS
Status: DISCONTINUED | OUTPATIENT
Start: 2018-03-16 | End: 2018-03-16

## 2018-03-16 RX ORDER — PHENYLEPHRINE HYDROCHLORIDE 10 MG/ML
INJECTION INTRAVENOUS
Status: DISCONTINUED | OUTPATIENT
Start: 2018-03-16 | End: 2018-03-16

## 2018-03-16 RX ADMIN — LIDOCAINE HYDROCHLORIDE 50 MG: 20 INJECTION, SOLUTION INTRAVENOUS at 07:03

## 2018-03-16 RX ADMIN — LIDOCAINE HYDROCHLORIDE: 10 INJECTION, SOLUTION EPIDURAL; INFILTRATION; INTRACAUDAL; PERINEURAL at 06:03

## 2018-03-16 RX ADMIN — FENTANYL CITRATE 50 MCG: 50 INJECTION, SOLUTION INTRAMUSCULAR; INTRAVENOUS at 06:03

## 2018-03-16 RX ADMIN — ONDANSETRON 4 MG: 2 INJECTION, SOLUTION INTRAMUSCULAR; INTRAVENOUS at 07:03

## 2018-03-16 RX ADMIN — PROPOFOL 110 MG: 10 INJECTION, EMULSION INTRAVENOUS at 07:03

## 2018-03-16 RX ADMIN — MIDAZOLAM 1 MG: 1 INJECTION INTRAMUSCULAR; INTRAVENOUS at 06:03

## 2018-03-16 RX ADMIN — SODIUM CHLORIDE, SODIUM GLUCONATE, SODIUM ACETATE, POTASSIUM CHLORIDE, MAGNESIUM CHLORIDE, SODIUM PHOSPHATE, DIBASIC, AND POTASSIUM PHOSPHATE: .53; .5; .37; .037; .03; .012; .00082 INJECTION, SOLUTION INTRAVENOUS at 07:03

## 2018-03-16 RX ADMIN — MIDAZOLAM 1 MG: 1 INJECTION INTRAMUSCULAR; INTRAVENOUS at 07:03

## 2018-03-16 RX ADMIN — FENTANYL CITRATE 50 MCG: 50 INJECTION, SOLUTION INTRAMUSCULAR; INTRAVENOUS at 08:03

## 2018-03-16 RX ADMIN — SODIUM CHLORIDE, SODIUM GLUCONATE, SODIUM ACETATE, POTASSIUM CHLORIDE, MAGNESIUM CHLORIDE, SODIUM PHOSPHATE, DIBASIC, AND POTASSIUM PHOSPHATE: .53; .5; .37; .037; .03; .012; .00082 INJECTION, SOLUTION INTRAVENOUS at 06:03

## 2018-03-16 RX ADMIN — CEFAZOLIN SODIUM 2 G: 2 SOLUTION INTRAVENOUS at 07:03

## 2018-03-16 RX ADMIN — PHENYLEPHRINE HYDROCHLORIDE 100 MCG: 10 INJECTION INTRAVENOUS at 08:03

## 2018-03-16 RX ADMIN — DEXAMETHASONE SODIUM PHOSPHATE 4 MG: 4 INJECTION, SOLUTION INTRAMUSCULAR; INTRAVENOUS at 07:03

## 2018-03-16 RX ADMIN — NEOSTIGMINE METHYLSULFATE 3 MG: 1 INJECTION INTRAVENOUS at 08:03

## 2018-03-16 RX ADMIN — ROCURONIUM BROMIDE 30 MG: 10 INJECTION, SOLUTION INTRAVENOUS at 07:03

## 2018-03-16 RX ADMIN — ACETAMINOPHEN 1000 MG: 10 INJECTION, SOLUTION INTRAVENOUS at 07:03

## 2018-03-16 RX ADMIN — ROPIVACAINE HYDROCHLORIDE: 2 INJECTION, SOLUTION EPIDURAL; INFILTRATION at 09:03

## 2018-03-16 RX ADMIN — GLYCOPYRROLATE 0.4 MG: 0.2 INJECTION, SOLUTION INTRAMUSCULAR; INTRAVENOUS at 08:03

## 2018-03-16 NOTE — DISCHARGE SUMMARY
[facility]  Discharge Note  Short Stay    Admit Date: 3/16/2018    Discharge Date and Time: 3/16/2018    Attending Physician: Messi Molina MD     Discharge Provider: Messi Molina    Diagnoses:  Active Hospital Problems    Diagnosis  POA    *Complete tear of left rotator cuff [M75.122]  Yes      Resolved Hospital Problems    Diagnosis Date Resolved POA   No resolved problems to display.       Discharged Condition: good    Hospital Course: Patient was admitted for an outpatient procedure and tolerated the procedure well with no complications.    Final Diagnoses: Same as principal problem.    Disposition: Home or Self Care    Follow up/Patient Instructions:    Medications:  Reconciled Home Medications:   Current Discharge Medication List      START taking these medications    Details   oxyCODONE-acetaminophen (PERCOCET) 5-325 mg per tablet Take 1 tablet by mouth every 6 (six) hours as needed for Pain.  Qty: 40 tablet, Refills: 0      promethazine (PHENERGAN) 25 MG tablet Take 1 tablet (25 mg total) by mouth every 8 (eight) hours as needed for Nausea.  Qty: 30 tablet, Refills: 0         CONTINUE these medications which have NOT CHANGED    Details   amlodipine-valsartan (EXFORGE)  mg per tablet Take 1 tablet by mouth once daily.  Qty: 90 tablet, Refills: 3    Associated Diagnoses: Hypertension, uncontrolled      gabapentin (NEURONTIN) 300 MG capsule TK 1 C PO HS  Refills: 1      hydrALAZINE (APRESOLINE) 50 MG tablet Take 2 tablets (100 mg total) by mouth every 12 (twelve) hours.  Qty: 60 tablet, Refills: 11    Associated Diagnoses: HTN (hypertension), malignant; Essential hypertension      LANTUS SOLOSTAR 100 unit/mL (3 mL) InPn pen ADMINISTER 30 UNITS UNDER THE SKIN TWICE DAILY  Qty: 15 mL, Refills: 0    Associated Diagnoses: Type 2 diabetes mellitus with hyperglycemia, with long-term current use of insulin      lidocaine-prilocaine (EMLA) cream as needed.       metFORMIN  (GLUCOPHAGE-XR) 500 MG 24 hr tablet Take 1 tablet (500 mg total) by mouth 2 (two) times daily with meals. 2 TABLETS TWICE DAILY WITH MEALS- Pt taking 1 tablet twice a day  Qty: 180 tablet, Refills: 3    Associated Diagnoses: Type 2 diabetes mellitus with chronic kidney disease, with long-term current use of insulin, unspecified CKD stage      metoprolol succinate (TOPROL-XL) 200 MG 24 hr tablet Take 1 tablet (200 mg total) by mouth once daily.  Qty: 90 tablet, Refills: 3    Associated Diagnoses: Uncontrolled hypertension      spironolactone (ALDACTONE) 25 MG tablet Take 1 tablet (25 mg total) by mouth every morning.  Qty: 30 tablet, Refills: 4      tiotropium (SPIRIVA WITH HANDIHALER) 18 mcg inhalation capsule Inhale 1 capsule (18 mcg total) into the lungs once daily.  Qty: 90 capsule, Refills: 1    Associated Diagnoses: Chronic obstructive pulmonary disease, unspecified COPD type      blood sugar diagnostic Strp 1 strip four times daily before meals and bedtime. True Metrix or equivalent covered by insurance. Diagnosis: E11.65  Qty: 200 each, Refills: 3    Associated Diagnoses: Type 2 diabetes mellitus with hyperglycemia, with long-term current use of insulin      blood-glucose meter (TRUE METRIX GLUCOSE METER) Misc 1 each by Misc.(Non-Drug; Combo Route) route As instructed (Diagnosis E11.65).  Qty: 1 each, Refills: 0    Associated Diagnoses: Type 2 diabetes mellitus with hyperglycemia, with long-term current use of insulin      clotrimazole-betamethasone 1-0.05% (LOTRISONE) cream Apply topically 2 (two) times daily.  Qty: 15 g, Refills: 0    Associated Diagnoses: Tinea corporis      lancets 33 gauge Misc 1 lancet by Misc.(Non-Drug; Combo Route) route 4 (four) times daily before meals and nightly. For True Metrix system. Diagnosis: E11.65  Qty: 200 each, Refills: 3    Associated Diagnoses: Type 2 diabetes mellitus with hyperglycemia, with long-term current use of insulin      pen needle, diabetic (PEN NEEDLE) 30  "gauge x 5/16" Ndle Inject 2 x /day  Qty: 200 each, Refills: 3    Associated Diagnoses: Type 2 diabetes mellitus with hyperglycemia, with long-term current use of insulin         STOP taking these medications       hydrocodone-acetaminophen 10-325mg (NORCO)  mg Tab Comments:   Reason for Stopping:               Discharge Procedure Orders  Diet general     Ice to affected area     Lifting restrictions     No driving, operating heavy equipment or signing legal documents while taking pain medication     Call MD for:  temperature >100.4     Call MD for:  persistent nausea and vomiting     Call MD for:  severe uncontrolled pain     Call MD for:  difficulty breathing, headache or visual disturbances     Call MD for:  redness, tenderness, or signs of infection (pain, swelling, redness, odor or green/yellow discharge around incision site)     Call MD for:  hives     Call MD for:  persistent dizziness or light-headedness     Call MD for:  extreme fatigue     Remove dressing in 48 hours     Change dressing (specify)   Order Comments: Dressing change: 1 times per day using waterproof bandaids.       Follow-up Information     Messi Molina MD In 2 weeks.    Specialties:  Sports Medicine, Orthopedic Surgery  Contact information:  [address] [place] [address] [address] Mary [place]EVEZ [address] [phone]                   Discharge Procedure Orders (must include Diet, Follow-up, Activity):    Discharge Procedure Orders (must include Diet, Follow-up, Activity)  Diet general     Ice to affected area     Lifting restrictions     No driving, operating heavy equipment or signing legal documents while taking pain medication     Call MD for:  temperature >100.4     Call MD for:  persistent nausea and vomiting     Call MD for:  severe uncontrolled pain     Call MD for:  difficulty breathing, headache or visual disturbances     Call MD for:  redness, tenderness, or signs of infection (pain, swelling, redness, odor or green/yellow discharge around " incision site)     Call MD for:  hives     Call MD for:  persistent dizziness or light-headedness     Call MD for:  extreme fatigue     Remove dressing in 48 hours     Change dressing (specify)   Order Comments: Dressing change: 1 times per day using waterproof bandaids.

## 2018-03-16 NOTE — OR NURSING
1210- Pt is awake but drowsy. Maintaining sats at 95% at present on room air. Opens eyes spontaneously and interactive. Drifts off to sleep easily. Amb with assist well. Intermittent mild nausea which self resolves after a short time. States she is ready to go home. Meets criteria for discharge.

## 2018-03-16 NOTE — ANESTHESIA POSTPROCEDURE EVALUATION
"Anesthesia Post Evaluation    Patient: Steff Johnson    Procedure(s) Performed: Procedure(s) (LRB):  REPAIR ROTATOR CUFF ARTHROSCOPIC (Left)  ARTHROSCOPY-SHOULDER WITH SUBACROMIAL DECOMPRESSION (Left)  [id number] END (Left)    Final Anesthesia Type: general  Patient location during evaluation: PACU  Patient participation: Yes- Able to Participate  Level of consciousness: awake and alert and oriented  Post-procedure vital signs: reviewed and stable  Pain management: adequate  Airway patency: patent  PONV status at discharge: No PONV  Anesthetic complications: no      Cardiovascular status: blood pressure returned to baseline  Respiratory status: unassisted, spontaneous ventilation and room air  Hydration status: euvolemic  Follow-up not needed.        Visit Vitals  BP (!) 143/80   Pulse (!) 53   Temp 36.2 °C (97.2 °F) (Skin)   Resp 14   Ht 4' 11" (1.499 m)   Wt 63.5 kg (140 lb)   SpO2 95%   Breastfeeding? No   BMI 28.28 kg/m²       Pain/Estefania Score: Pain Assessment Performed: Yes (3/16/2018  9:25 AM)  Presence of Pain: denies (3/16/2018  9:25 AM)  Estefania Score: 8 (3/16/2018  9:25 AM)      "

## 2018-03-16 NOTE — TRANSFER OF CARE
"Anesthesia Transfer of Care Note    Patient: Steff Johnson    Procedure(s) Performed: Procedure(s) (LRB):  REPAIR ROTATOR CUFF ARTHROSCOPIC (Left)  ARTHROSCOPY-SHOULDER WITH SUBACROMIAL DECOMPRESSION (Left)  CQIUPELM-[id number]-[id number] END (Left)    Patient location: PACU    Anesthesia Type: general    Transport from OR: Transported from OR on 2-3 L/min O2 by NC with adequate spontaneous ventilation    Post pain: adequate analgesia    Post assessment: no apparent anesthetic complications    Post vital signs: stable    Level of consciousness: responds to stimulation and sedated    Nausea/Vomiting: no nausea/vomiting    Complications: none    Transfer of care protocol was followed      Last vitals:   Visit Vitals  /71 (BP Location: Right arm, Patient Position: Sitting)   Pulse (!) 52   Temp 36.2 °C (97.2 °F) (Skin)   Resp 12   Ht 4' 11" (1.499 m)   Wt 63.5 kg (140 lb)   SpO2 100%   Breastfeeding? No   BMI 28.28 kg/m²     "

## 2018-03-16 NOTE — ANESTHESIA PROCEDURE NOTES
Peripheral    Patient location during procedure: pre-op   Block not for primary anesthetic.  Reason for block: at surgeon's request and post-op pain management   Post-op Pain Location: TORN ROTATOR CUFF LEFT  Start time: 3/16/2018 7:00 AM  Timeout: 3/16/2018 7:00 AM   End time: 3/16/2018 7:20 AM  Surgery related to: REPAIR TORN ROTATOR CUFF LEFT  Staffing  Anesthesiologist: AYO LOMAX  Performed: anesthesiologist   Preanesthetic Checklist  Completed: patient identified, site marked, surgical consent, pre-op evaluation, timeout performed, IV checked, risks and benefits discussed and monitors and equipment checked  Peripheral Block  Patient position: supine  Prep: ChloraPrep and site prepped and draped  Patient monitoring: heart rate, cardiac monitor, continuous pulse ox, continuous capnometry and frequent blood pressure checks  Block type: interscalene  Laterality: left  Injection technique: continuous  Needle  Needle type: Tuohy   Needle gauge: 18 G  Needle length: 2 in  Needle localization: anatomical landmarks and ultrasound guidance  Catheter type: non-stimulating  Catheter size: 20 G   -ultrasound image captured on disc.  Assessment  Injection assessment: negative aspiration, negative parasthesia and local visualized surrounding nerve  Paresthesia pain: none  Heart rate change: no  Slow fractionated injection: yes  Medications:  Bolus administered: 30 mL of 0.25 and 0.5 ropivacaine  Additional Notes  VSS.  [facility] RN monitoring vitals throughout procedure.  Patient tolerated procedure well.   8 MG OF DECADRON TO ROPIVACAINE

## 2018-03-16 NOTE — H&P
"Past Medical History:   Diagnosis Date    COPD (chronic obstructive pulmonary disease)      Diabetes mellitus      Hypertension                 Past Surgical History:   Procedure Laterality Date    COLONOSCOPY N/A 4/11/2017     Procedure: COLONOSCOPY;  Surgeon: Jared Antonio MD;  Location: [facility];  Service: Endoscopy;  Laterality: N/A;    HYSTERECTOMY        SHOULDER SURGERY         R              Current Outpatient Prescriptions   Medication Sig    amlodipine-valsartan (EXFORGE)  mg per tablet Take 1 tablet by mouth once daily.    blood sugar diagnostic Strp 1 strip four times daily before meals and bedtime. True Metrix or equivalent covered by insurance. Diagnosis: E11.65    blood-glucose meter (TRUE METRIX GLUCOSE METER) Misc 1 each by Misc.(Non-Drug; Combo Route) route As instructed (Diagnosis E11.65).    chlorthalidone (HYGROTEN) 25 MG Tab Take 1 tablet (25 mg total) by mouth once daily.    gabapentin (NEURONTIN) 300 MG capsule TK 1 C PO HS    hydrALAZINE (APRESOLINE) 50 MG tablet Take 2 tablets (100 mg total) by mouth every 12 (twelve) hours.    hydrocodone-acetaminophen 10-325mg (NORCO)  mg Tab every 8 (eight) hours as needed for Pain.     insulin glargine (LANTUS SOLOSTAR) 100 unit/mL (3 mL) InPn pen 30 u sq bid (Patient taking differently: 30 Units 2 (two) times daily. 30 u sq bid)    lancets 33 gauge Misc 1 lancet by Misc.(Non-Drug; Combo Route) route 4 (four) times daily before meals and nightly. For True Metrix system. Diagnosis: E11.65    lidocaine-prilocaine (EMLA) cream as needed.     metformin (GLUCOPHAGE-XR) 500 MG 24 hr tablet 2 TABLETS TWICE DAILY WITH MEALS (Patient taking differently: 2 TABLETS TWICE DAILY WITH MEALS- Pt taking 1 tablet twice a day)    metoprolol succinate (TOPROL-XL) 200 MG 24 hr tablet Take 1 tablet (200 mg total) by mouth once daily.    pen needle, diabetic (PEN NEEDLE) 30 gauge x 5/16" Ndle Inject 2 x /day    tiotropium (SPIRIVA WITH " HANDIHALER) 18 mcg inhalation capsule Inhale 1 capsule (18 mcg total) into the lungs once daily. (Patient taking differently: Inhale 18 mcg into the lungs once daily. Pt does not have-pending insurance)    zolpidem (AMBIEN) 10 mg tablet Take 10 mg by mouth nightly as needed. Pt does not have pending ins approval      No current facility-administered medications for this visit.          Review of patient's allergies indicates:  No Known Allergies           Family History   Problem Relation Age of Onset    Diabetes Mother      Asthma Mother      Hypertension Mother      Diabetes Father      Diabetes Brother      Hypertension Brother      Anemia Daughter           Social History   Social History            Social History    Marital status: Single       Spouse name: N/A    Number of children: N/A    Years of education: N/A            Occupational History              disabled due to shoulder problems           Social History Main Topics    Smoking status: Never Smoker    Smokeless tobacco: Never Used    Alcohol use No    Drug use: No    Sexual activity: Not Currently           Other Topics Concern    Not on file          Social History Narrative    No narrative on file            Chief Complaint:        Chief Complaint   Patient presents with    Shoulder Pain       L shoulder mri results          Interval history: Is a 58-year-old right-hand-dominant female seen for chronic left shoulder pain.  Patient is a type II diabetic.  Patient has a history of prior rotator cuff issues on the right ultimately requiring surgery.  She has had problems now on the left shoulder for about 3 years.  She's had a prior cortisone injection but had a reaction to it so she does not want another one.  She has some physical therapy ordered but has not started yet due to dealing with some family issues with her granddaughter.  Pain is an 8 out of 10.  Pain radiates down her arm at times.  She has difficulty reaching above  her head or across her chest or back.  Symptoms are severe.     History of present illness: The patient's left shoulder is not getting much better.  It has been another 6 weeks and the symptoms are about the same.  Pain is still an 8 out of 10.  She has not started physical therapy because of transportation problems.  MRI shows large rotator cuff tear.        Review of Systems:     Constitution: Negative for chills, fever, and sweats.  Negative for unexplained weight loss.     HENT:  Negative for headaches and blurry vision.     Cardiovascular:Negative for chest pain or irregular heart beat. Negative for hypertension.     Respiratory:  Negative for cough and shortness of breath.     Gastrointestinal: Negative for abdominal pain, heartburn, melena, nausea, and vomitting.     Genitourinary:  Negative bladder incontinence and dysuria.     Musculoskeletal:  See HPI     Neurological: Negative for numbness.     Psychiatric/Behavioral: Negative for depression.  The patient is not nervous/anxious.       Endocrine: Negative for polyuria     Hematologic/Lymphatic: Negative for bleeding problem.  Does not bruise/bleed easily.     Skin: Negative for poor would healing and rash        Physical Examination:     Vital Signs:        Vitals:     11/09/17 1116   BP: (!) 229/108   Pulse: 70         Body mass index is 27.87 kg/m².     This a well-developed, well nourished patient in no acute distress.  They are alert and oriented and cooperative to examination.  Pt. walks without an antalgic gait.       Examination of the left shoulder shows no rashes or erythema. There are no masses, ecchymosis, or atrophy. The patient has full range of motion in forward flexion, external rotation, and internal rotation to the mid T-spine. The patient has moderately positive impingement signs. - Pennington's test. - Speeds test. Nontender to palpation over a.c. joint.Passive range of motion: Forward flexion of 180°, external rotation at 90° of 90°,  internal rotation of 50°, and external rotation at 0° of 50°. 2+ radial pulse. Intact axillary, radial, median and ulnar sensation.  4 out of 5 resisted forward flexion, external rotation, and negative lift off test.        X-rays: X-rays left shoulder reviewed which show degenerative changes both around the acromioclavicular joint.  Patient also has what looks like possible loose bodies within the subacromial bursa.  She also has some thickening at the greater tuberosity consistent with chronic rotator cuff issues.     MRI left shoulder:Complete tear and retraction of the supraspinatus tendon of the rotator cuff. The Rest of the structures of the rotator cuff appear intact with a small joint effusion noted. Hypertrophic changes at the a.c. joint with both superior and inferior spur formation.     Assessment:: Left rotator cuff tear     Plan:  I reviewed the MRI with her today.  Patient has a pretty large supraspinatus tear with retraction back to the glenoid.  We talked about surgical versus nonsurgical treatment.  Patient would like to go ahead and fix this surgically.  We talked about the possibility of needing to use a patch to repair our augment the tear.  We will also do a subacromial decompression and distal clavicle excision for the spurring that she has.Risks, benefits, and alternatives to the procedure were explained to the patient including but not limited to damage to nerves, arteries, blood vessels, bones, tendons, ligaments, stiffness, instability, infection, DVT, PE, as well as general anesthetic complications including seizure, stroke, heart attack and even death. The patient understood these risks and wished to proceed and signed the informed consent.         This note was created using Dragon voice recognition software that occasionally misinterpreted phrases or words.     Consult note is delivered via Epic messaging service.

## 2018-03-16 NOTE — DISCHARGE INSTRUCTIONS
1.Diet: Ice chips, clear liquids, and then diet as tolerated. Drink plenty of liquids.  2.Ice the area at least three times a day (20 minutes per session).  3.Elevate the extremity above the level of the heart to help reduce swelling.  4.Pain medication can be taken every four to six hours as needed. It is helpful to take pain medication prior to physical therapy.  5.Any activity that requires precise thinking or accuracy should be avoided for a minimum of 72 hours after surgery and while on narcotic pain medication. This includes operating machinery and/or driving a vehicle.  6.All sutures/staples will be removed approximately 14 days from the time of surgery. Leave steri-strips (skin tapes) in place until sutures are removed.  7. If skin glue is used instead of stitches, do not apply ointments or solutions to the incision. Keep the incision dry. The skin glue will peel off in 3-4 weeks.  8. Change dressing on the 2nd post-op day. Use gauze for the first 3 days, then start using Band-Aids over the incision sites.   9. All casts, splints, braces, slings, crutches, abduction pillows, etc... Are to be worn as instructed. Use sling at all times except for showering and exercises.  10. Keep the incision dry for 10-14 days. A waterproof dressing (purchase at [employer], [employer], etc) can be used to shower. No bath, pool, hot tub until instructed.  11. Start PT in 14 days. Call office to help with scheduling.  12. Call [phone] with any questions or concerns.      Post op instructions for prevention of DVT  What is deep vein thrombosis?  Deep vein thrombosis (DVT) is the medical term for blood clots in the deep veins of the leg.  These blood clots can be dangerous.  A DVT can block a blood vessel and keep blood from getting where it needs to go.  Another problem is that the clot can travel to other parts of the body such as the lungs.  A clot that travels to the lungs is called a pulmonary embolus (PE) and can cause serious  problems with breathing which can lead to death.  Am I at risk for DVT/PE?  If you are not very active, you are at risk of DVT.  Anyone confined to bed, sitting for long periods of time, recovering from surgery, etc. increases the risk of DVT.  Other risk factors are cancer diagnosis, certain medications, estrogen replacement in any form,older age, obesity, pregnancy, smoking, history of clotting disorders, and dehydration.  How will I know if I have a DVT?   Swelling in the lower leg   Pain   Warmth, redness, hardness or bulging of the vein  If you have any of these symptoms, call your doctors office right away.  Some people will not have any symptoms until the clot moves to the lungs.  What are the symptoms of a PE?   Panting, shortness of breath, or trouble breathing   Sharp, knife-like chest pain when you breathe   Coughing or coughing up blood   Rapid heartbeat  If you have any of these symptoms or get worse quickly, call 911 for emergency treatment.  How can I prevent a DVT?   Avoid long periods of inactivity and dont cross your legs--get up and walk around every hour or so.   Stay active--walking after surgery is highly encouraged.  This means you should get out of the house and walk in the neighborhood.  Going up and down stairs will not impair healing (unless advised against such activity by your doctor).     Drink plenty of noncaffeinated, nonalcoholic fluids each day to prevent dehydration.   Wear special support stockings, if they have been advised by your doctor.   If you travel, stop at least once an hour and walk around.   Avoid smoking (assistance with stopping is available through your healthcare provider)  Always notify your doctor if you are not able to follow the post operative instructions that are given to you at the time of discharge.  It may be necessary to prescribe one of the medications available to prevent DVT.        General Information:    1.  Do not drink alcoholic  beverages including beer for 24 hours or as long as you are on pain medication..  2.  Do not drive a motor vehicle, operate machinery or power tools, or signs legal papers for 24 hours or as long as you are on pain medication.   3.  You may experience light-headedness, dizziness, and sleepiness following surgery. Please do not stay alone. A responsible adult should be with you for this 24 hour period.  4.  Go home and rest.    5. Progress slowly to a normal diet unless instructed.  Otherwise, begin with liquids such as soft drinks, then soup and crackers working up to solid foods. Drink plenty of nonalcoholic fluids.  6.  Certain anesthetics and pain medications produce nausea and vomiting in certain       individuals. If nausea becomes a problem at home, call you doctor.    7. A nurse will be calling you sometime after surgery. Do not be alarmed. This is our way of finding out how you are doing.    8. Several times every hour while you are awake, take 2-3 deep breaths and cough. If you had stomach surgery hold a pillow or rolled towel firmly against your stomach before you cough. This will help with any pain the cough might cause.  9. Several times every hour while you are awake, pump and flex your feet 5-6 times and do foot circles. This will help prevent blood clots.    10.Call your doctor for severe pain, bleeding, fever, or signs or symptoms of infection (pain, swelling, redness, foul odor, drainage).      Using Opioids for Pain Management     Your doctor has given instructions for you to take an opioid.  This is a drug for bad pain.  It helps control pain without causing bleeding and kidney problems.  Common opioid names are morphine, hydromorphone, oxycodone, and methadone. These drugs are called narcotics.    There are several safety concerns you need to know.     · It is against the law to give or sell this drug to another person.  You must keep this medicine safely locked.    · You may have side effects  from taking this medication.  These include nausea, itching, sweating, sleepiness, a change in your ability to breathe, and depression.  · Do not take alcohol or sleeping pills opioids.    · Long-term opoid use may no longer giver you relief from pain.  It can cause you stomach pain, mental anxiety, and headaches.  Long-term opoid use can potentially lead to unlawful street drug abuse and reduce your ability to stay employed.    · Your body may become opioid tolerant if you need to take more to get relief.    · You must stop taking opioids if you begin having more pain as a result of the medicine.    · Opioid withdrawal occurs when you have to stop taking the drug.  It can cause you to have nausea, vomiting, diarrhea, stomach pain, anxiety, and dilated pupils in your eyes. This condition means you are opioid dependent.    · Addiction is a drug induced brain disease. It means there are changes in how your brain is working.  Children, teens, and young adults under 25 years old are more likely to get addicted to opioids.      · Addiction can happen with repeated opioid use.  It does not happen with short-term use of two weeks or less.       For more information, please speak with your doctor or pharmacist.        Discharge Instructions: After Your Surgery/Procedure  Youve just had surgery. During surgery you were given medicine called anesthesia to keep you relaxed and free of pain. After surgery you may have some pain or nausea. This is common. Here are some tips for feeling better and getting well after surgery.     Stay on schedule with your medication.   Going home  Your doctor or nurse will show you how to take care of yourself when you go home. He or she will also answer your questions. Have an adult family member or friend drive you home.      For your safety we recommend these precaution for the first 24 hours after your procedure:  · Do not drive or use heavy equipment.  · Do not make important decisions or  "sign legal papers.  · Do not drink alcohol.  · Have someone stay with you, if needed. He or she can watch for problems and help keep you safe.  · Your concentration, balance, coordination, and judgement may be impaired for many hours after anesthesia.  Use caution when ambulating or standing up.     · You may feel weak and "washed out" after anesthesia and surgery.      Subtle residual effects of general anesthesia or sedation with regional / local anesthesia can last more than 24 hours.  Rest for the remainder of the day or longer if your Doctor/Surgeon has advised you to do so.  Although you may feel normal within the first 24 hours, your reflexes and mental ability may be impaired without you realizing it.  You may feel dizzy, lightheaded or sleepy for 24 hours or longer.      Be sure to go to all follow-up visits with your doctor. And rest after your surgery for as long as your doctor tells you to.  Coping with pain  If you have pain after surgery, pain medicine will help you feel better. Take it as told, before pain becomes severe. Also, ask your doctor or pharmacist about other ways to control pain. This might be with heat, ice, or relaxation. And follow any other instructions your surgeon or nurse gives you.  Tips for taking pain medicine  To get the best relief possible, remember these points:  · Pain medicines can upset your stomach. Taking them with a little food may help.  · Most pain relievers taken by mouth need at least 20 to 30 minutes to start to work.  · Taking medicine on a schedule can help you remember to take it. Try to time your medicine so that you can take it before starting an activity. This might be before you get dressed, go for a walk, or sit down for dinner.  · Constipation is a common side effect of pain medicines. Call your doctor before taking any medicines such as laxatives or stool softeners to help ease constipation. Also ask if you should skip any foods. Drinking lots of fluids " and eating foods such as fruits and vegetables that are high in fiber can also help. Remember, do not take laxatives unless your surgeon has prescribed them.  · Drinking alcohol and taking pain medicine can cause dizziness and slow your breathing. It can even be deadly. Do not drink alcohol while taking pain medicine.  · Pain medicine can make you react more slowly to things. Do not drive or run machinery while taking pain medicine.  Your health care provider may tell you to take acetaminophen to help ease your pain. Ask him or her how much you are supposed to take each day. Acetaminophen or other pain relievers may interact with your prescription medicines or other over-the-counter (OTC) drugs. Some prescription medicines have acetaminophen and other ingredients. Using both prescription and OTC acetaminophen for pain can cause you to overdose. Read the labels on your OTC medicines with care. This will help you to clearly know the list of ingredients, how much to take, and any warnings. It may also help you not take too much acetaminophen. If you have questions or do not understand the information, ask your pharmacist or health care provider to explain it to you before you take the OTC medicine.  Managing nausea  Some people have an upset stomach after surgery. This is often because of anesthesia, pain, or pain medicine, or the stress of surgery. These tips will help you handle nausea and eat healthy foods as you get better. If you were on a special food plan before surgery, ask your doctor if you should follow it while you get better. These tips may help:  · Do not push yourself to eat. Your body will tell you when to eat and how much.  · Start off with clear liquids and soup. They are easier to digest.  · Next try semi-solid foods, such as mashed potatoes, applesauce, and gelatin, as you feel ready.  · Slowly move to solid foods. Dont eat fatty, rich, or spicy foods at first.  · Do not force yourself to have 3  large meals a day. Instead eat smaller amounts more often.  · Take pain medicines with a small amount of solid food, such as crackers or toast, to avoid nausea.     Call your surgeon if  · You still have pain an hour after taking medicine. The medicine may not be strong enough.  · You feel too sleepy, dizzy, or groggy. The medicine may be too strong.  · You have side effects like nausea, vomiting, or skin changes, such as rash, itching, or hives.       If you have obstructive sleep apnea  You were given anesthesia medicine during surgery to keep you comfortable and free of pain. After surgery, you may have more apnea spells because of this medicine and other medicines you were given. The spells may last longer than usual.   At home:  · Keep using the continuous positive airway pressure (CPAP) device when you sleep. Unless your health care provider tells you not to, use it when you sleep, day or night. CPAP is a common device used to treat obstructive sleep apnea.  · Talk with your provider before taking any pain medicine, muscle relaxants, or sedatives. Your provider will tell you about the possible dangers of taking these medicines.  © 3062-6045 [employer]. [address] [place] [address]. All rights reserved. This information is not intended as a substitute for professional medical care. Always follow your healthcare professional's instructions.        Discharge Instructions for Shoulder Arthroscopy  You had a shoulder arthroscopy. It is a surgical procedure that helps the healthcare provider diagnose and treat shoulder problems. These include instability, arthritis, and rotator cuff problems. Below are instructions to help you care for your shoulder when you are at home.  What to expect  After surgery, your joint may be swollen, painful, and stiff. The joint will heal with time. But, recovery times vary depending on what was done. For example, with a shaved rotator cuff, you may be told  to move your arm soon after surgery to prevent stiffness. But if the rotator cuff is repaired or treatment is for instability or arthritis, your healthcare provider may want you to limit movement of your arm for a period of time. Follow your healthcare providers instructions regarding arm movement.  Activity        · Dont drive until your healthcare provider says its OK. And never drive while taking opioid pain medicine.  · Bend your wrist and wiggle your fingers often to help blood flow.  Incision care  · Check your incision daily for redness, tenderness, or drainage.  · Wait 3 day(s) after your surgery to begin showering. Then shower as needed. Carefully wash your incision with soap and water. Gently pat it dry. Dont rub the incision, or apply creams or lotions to it.  · Dont soak in a bathtub, hot tub, or pool until your healthcare provider says its OK.  Other home care  · Take your temperature daily for 7 days after your surgery. Report a fever above 100.4º F (38º C) to your healthcare provider. Fever may be a sign of infection.  · Wear your sling or immobilizer as directed by your healthcare provider.  · Use pain medicine, as needed and as directed. Don't wait until the pain is severe to start using the medicine.   · Use an ice pack or a bag of frozen peas--or something similar--wrapped in a thin towel on your shoulder to reduce swelling for the first 48 hours after surgery. Hold the ice pack. Leave the ice pack on for 20 minutes; then take it off for 20 minutes. Repeat as needed.  Follow-up  Make a follow-up appointment as directed by your healthcare provider.  When to seek medical attention  Call 911 right away if you have any of the following:  · Chest pain  · Shortness of breath  Otherwise, call your healthcare provider immediately if you have any of the following:  · Increasing shoulder pain or pain not relieved by medicine  · Pain or swelling in the arm on the side of your surgery  · Numbness,  tingling, or blue-gray color of your arm or fingers on the side of your surgery  · Drainage or oozing, redness, or warmth at the incision  · Fever above 100.4º F (38º C) or shaking chills  · Nausea or vomiting   Date Last Reviewed: 11/16/2015  © 9828-0204 [employer]. [address] [place] [address]. All rights reserved. This information is not intended as a substitute for professional medical care. Always follow your healthcare professional's instructions.

## 2018-03-16 NOTE — ANESTHESIA PREPROCEDURE EVALUATION
03/16/2018  Steff Johnson is a 58 y.o., female.    Anesthesia Evaluation    I have reviewed the Patient Summary Reports.    I have reviewed the Nursing Notes.   I have reviewed the Medications.     Review of Systems  Anesthesia Hx:  No problems with previous Anesthesia    Social:  Non-Smoker    Cardiovascular:   Hypertension    Pulmonary:   COPD, mild    Endocrine:   Diabetes, type 2           Anesthesia Plan  Type of Anesthesia, risks & benefits discussed:  Anesthesia Type:  general, regional  Patient's Preference:   Intra-op Monitoring Plan: standard ASA monitors  Intra-op Monitoring Plan Comments:   Post Op Pain Control Plan: peripheral nerve block  Post Op Pain Control Plan Comments:   Induction:   IV  Beta Blocker:  Patient is on a Beta-Blocker and has received one dose within the past 24 hours (No further documentation required).       Informed Consent: Patient understands risks and agrees with Anesthesia plan.  Questions answered. Anesthesia consent signed with patient.  ASA Score: 3     Day of Surgery Review of History & Physical: I have interviewed and examined the patient. I have reviewed the patient's H&P dated:  There are no significant changes.          Ready For Surgery From Anesthesia Perspective.

## 2018-03-16 NOTE — OR NURSING
1030- Pt not maintaining sats without encouragement. No distress but very sleepy. O2 at 2L nc placed on pt. Will continue to observe.

## 2018-03-16 NOTE — ANESTHESIA POSTPROCEDURE EVALUATION
"Anesthesia Post Evaluation    Patient: Steff Johnson    Procedure(s) Performed: Procedure(s) (LRB):  REPAIR ROTATOR CUFF ARTHROSCOPIC (Left)  ARTHROSCOPY-SHOULDER WITH SUBACROMIAL DECOMPRESSION (Left)  [id number] END (Left)    Final Anesthesia Type: general  Patient location during evaluation: PACU  Patient participation: Yes- Able to Participate  Level of consciousness: awake and alert and oriented  Post-procedure vital signs: reviewed and stable  Pain management: adequate  Airway patency: patent  PONV status at discharge: No PONV  Anesthetic complications: no      Cardiovascular status: blood pressure returned to baseline  Respiratory status: unassisted, spontaneous ventilation and room air  Hydration status: euvolemic  Follow-up not needed.        Visit Vitals  /76   Pulse (!) 50   Temp 36.2 °C (97.2 °F) (Skin)   Resp (!) 22   Ht 4' 11" (1.499 m)   Wt 63.5 kg (140 lb)   SpO2 98%   Breastfeeding? No   BMI 28.28 kg/m²       Pain/Estefania Score: Pain Assessment Performed: Yes (3/16/2018  9:25 AM)  Presence of Pain: denies (3/16/2018  9:25 AM)  Estefania Score: 8 (3/16/2018  9:25 AM)      "

## 2018-03-16 NOTE — PLAN OF CARE
Meets criteria for discharge. Discharged to home with grand daughter. Nausea resolved. Tolerating fluids. Denies pain. Steady gait with assist. Discharge instructions reviewed and printed handout given, including On Q pain ball care/removal and polar cube. Teach back used. Verbalized understanding.

## 2018-03-16 NOTE — OP NOTE
[facility]  Orthopedic Surgery  Operative Note    SUMMARY     Date of Procedure: 3/16/2018     Procedure: Procedure(s) (LRB):  REPAIR ROTATOR CUFF ARTHROSCOPIC (Left)  ARTHROSCOPY-SHOULDER WITH SUBACROMIAL DECOMPRESSION (Left)  [id number] END (Left)     Left extensive debridement    Surgeon(s) and Role:     * Messi Molina MD - Primary    Assistant: Santino Roper    Pre-Operative Diagnosis: Complete tear of left rotator cuff [M75.122]    Post-Operative Diagnosis: Post-Op Diagnosis Codes:     * Complete tear of left rotator cuff [M75.122]    Anesthesia: General      Description of the Findings of the Procedure: Diagnostic arthroscopy findings on the patient's Left shoulder showed well   maintained labrum, biceps tendon and subscapularis. The patient had large retracted tear of the supraspinatus back to the glenoid rim. Articular surface was normal. In the subacromial space, the patient had bursitis in the subacromial space with a type 2 acromion with small os acromiale. There was some undersurface wear of the acromion already possibly from some acetabularization. Ac joint showed moderate arthritic changes with inferior spurring.       Complications: No    Estimated Blood Loss (EBL): 10Ml           Implants:   Implant Name Type Inv. Item Serial No.  Lot No. LRB No. Used   ANCHOR OLIVAS BC CORKSCREW 5.5MM - [id number]  ANCHOR OLIVSA BC CORKSCREW 5.5MM  [employer] [id number] Left 1   ANCHOR OLIVAS BC CORKSCREW 5.5MM - [id number]  ANCHOR OLIVAS BC CORKSCREW 5.5MM  [employer] [id number] Left 1   ANCHOR OLIVAS BC CORKSCREW 5.5MM - [id number]  ANCHOR OLIVAS BC CORKSCREW 5.5MM  [employer] [id number] Left 1   ANCHOR BIOCOMP SWVLLOK - [id number]   ANCHOR BIOCOMP SWVLLOK   [employer] [id number] Left 1       Specimens:   Specimen (12h ago through future)    None                  Condition: Good    Disposition: PACU - hemodynamically stable.    Attestation: I was present and scrubbed for the entire  procedure.      Indications for the procedure:A 58 year old female with a history ofLeft shoulder pain that failed to resolve with physical therapy, activity modification, injections, and rest.  Patient desired the procedure listed above after MRI was obtained.    PROCEDURE IN DETAIL: Risks, benefits and alternatives of the procedure were   explained to the patient including, but not limited to damage to nerves,   arteries and blood vessels. Also explained risk of re-rupture, nonhealing, infection, DVT,   PE as well as anesthetic complications including seizure, stroke, heart attack   and death. They understood this, signed informed consent. The patient's Left  shoulder was marked prior to coming to the Operating Room. Once there a formal   timeout was done in which correct patient, procedure and operative site were all   correctly identified and confirmed by the entire operating team. Ancef 2 g was   given prior to surgical incision. General anesthesia was induced. The   patient was placed in lateral decubitus position on a bean bag with his Left upper extremity   hanging in traction.The arm was suspended with 10 lbs of weight for balanced traction. The extremity was prepped and draped in normal   sterile fashion.A standard posterior portal was then made. A spinal   needle was used to localize an anterior portal within the rotator interval and   this was made as well. We then performed a diagnostic arthroscopy of the   glenohumeral joint through both the anterior and posterior viewing portals,with the findings listed above.Shaver was used to debride the cuff tear and some adhesions.    After this was done, we then proceeded up in the subacromial space   where a spinal needle was used to localize a lateral portal and then this was   made as well. A 4.0 shaver was used to perform a subtotal bursectomy. We then   used the ablator to remove the soft tissue off the undersurface of the acromion leaving the CA ligament  intact. Small os acromial was removed with cuate as well as some of the undersurface distal clavicle that was causing some impingement.   and then the 4.0 bur was used to turn a type 2 acromion into a type 1 acromion,   smooth off the undersurface.We then thoroughly inspected the cuff on the rotator cuff side. We shaved away any additional adhesions in the anterior, lateral on posterior gutters.  Cuff was released using a Loomis elevator and then a posterior interval slide was performed with a scissor releasing the supraspinatus from the mostly intact infraspinatus. Passport cannulas were placed in the lateral portal and an accessory lateral portal was created right off the acromial edge. Cannula placed here also. Plastic shuttling cannula placed anteriorly. Footprint was prepared using the shaver and cuate. 3 double loaded Biocomposite Corkscrew loaded with Suturetape were punched and placed for the medial row slightly medializing off the normal articular margin. The tapes were passed through the cuff tissue using a Fast Pass Scorpion. The tapes were then secured in the lateral row to 1 x 4.75 Biocomposite Swivellock anchor.      All excess fluid was removed from the shoulder. The portals were closed using nylon. Sterile dressing applied.  They were then placed in a cryotherapy cuff with   UltraSling, extubated, awakened and transferred from the Operating Room to   Recovery Room in stable condition.     Postoperative rehab course will be for RCR

## 2018-03-17 ENCOUNTER — HOSPITAL ENCOUNTER (EMERGENCY)
Facility: HOSPITAL | Age: 58
Discharge: HOME | End: 2018-03-17
Attending: EMERGENCY MEDICINE
Payer: MEDICARE

## 2018-03-17 VITALS
HEIGHT: 59 IN | OXYGEN SATURATION: 95 % | HEART RATE: 69 BPM | TEMPERATURE: 100 F | WEIGHT: 140 LBS | SYSTOLIC BLOOD PRESSURE: 217 MMHG | DIASTOLIC BLOOD PRESSURE: 95 MMHG | RESPIRATION RATE: 12 BRPM | BODY MASS INDEX: 28.22 KG/M2

## 2018-03-17 DIAGNOSIS — Z46.89 AFTERCARE FOR FITTING AND ADJUSTMENT OF BRACE: ICD-10-CM

## 2018-03-17 DIAGNOSIS — Z48.89 ENCOUNTER FOR POST SURGICAL WOUND CHECK: Primary | ICD-10-CM

## 2018-03-17 PROCEDURE — 99283 EMERGENCY DEPT VISIT LOW MDM: CPT

## 2018-03-17 NOTE — ED PROVIDER NOTES
Encounter Date: 3/17/2018    SCRIBE #1 NOTE: I, Chey Dunbar, am scribing for, and in the presence of, Miracle Robertson PA-C.       History     Chief Complaint   Patient presents with    brace application     Had L rotator cuff surgery yesterday. Took sling off and needs help reapplying.       03/17/2018  12:12 PM    Chief Complaint: Brace Application.       The patient is a 58 y.o. female who presents for help reapplying her sling. Pt states she had left rotator cuff surgery yesterday. She states she took off her sling and needs assistance reapplying it. Denies drainage from site, fever. PMHx includes arthritis, COPD, diabetes mellitus, HTN.         The history is provided by the patient.     Review of patient's allergies indicates:  No Known Allergies  Past Medical History:   Diagnosis Date    Arthritis     COPD (chronic obstructive pulmonary disease)     Diabetes mellitus     Hypertension     Wears glasses      Past Surgical History:   Procedure Laterality Date    COLONOSCOPY N/A 4/11/2017    Procedure: COLONOSCOPY;  Surgeon: Jared Antonio MD;  Location: [facility];  Service: Endoscopy;  Laterality: N/A;    HYSTERECTOMY      OOPHORECTOMY      SHOULDER SURGERY      R     Family History   Problem Relation Age of Onset    Diabetes Mother     Asthma Mother     Hypertension Mother     Diabetes Father     Diabetes Brother     Hypertension Brother     Anemia Daughter      Social History   Substance Use Topics    Smoking status: Never Smoker    Smokeless tobacco: Never Used    Alcohol use No     Review of Systems   Constitutional: Negative for chills and fever.   HENT: Negative for facial swelling and trouble swallowing.    Eyes: Negative for discharge.   Respiratory: Negative for cough, chest tightness, shortness of breath and wheezing.    Cardiovascular: Negative for chest pain and palpitations.   Gastrointestinal: Negative for abdominal pain, diarrhea, nausea and vomiting.   Genitourinary:  Negative for dysuria and hematuria.   Musculoskeletal: Negative for arthralgias, back pain, joint swelling, myalgias, neck pain and neck stiffness.   Skin: Negative for color change, pallor, rash and wound.   Neurological: Negative for dizziness, syncope, weakness, light-headedness, numbness and headaches.   Hematological: Does not bruise/bleed easily.   Psychiatric/Behavioral: The patient is not nervous/anxious.        Physical Exam     Initial Vitals [03/17/18 1159]   BP Pulse Resp Temp SpO2   (!) 217/95 69 12 99.5 °F (37.5 °C) 95 %      MAP       135.67         Physical Exam    Nursing note and vitals reviewed.  Constitutional: She appears well-developed and well-nourished. She is not diaphoretic. No distress.   HENT:   Head: Normocephalic and atraumatic.   Cardiovascular: Intact distal pulses.   Musculoskeletal: Normal range of motion. She exhibits tenderness.   Intact sutures and bandage to left shoulder.  Pain pump intact.  Palpable 2+ radial pulse.  Decreased ROM in left shoulder secondary to pain.    Neurological: She is alert and oriented to person, place, and time. She has normal strength. No sensory deficit.   Skin: Skin is warm and dry. No rash and no abscess noted. No erythema.   Psychiatric: She has a normal mood and affect.         ED Course   Procedures  Labs Reviewed - No data to display          Medical Decision Making:   History:   Old Medical Records: I decided to obtain old medical records.  ED Management:  Sling and swathe applied and pt tolerated well.       APC / Resident Notes:   Sling and swathe reapplied.  Pt is much more comfortable.  She is discharged home to follow-up with her orthopedic surgeon for re-evaluation and further treatment options.  She voices understanding and is agreeable to the plan.  She is given specific return precautions.        Scribe Attestation:   Scribe #1: I performed the above scribed service and the documentation accurately describes the services I performed. I  attest to the accuracy of the note.    Attending Attestation:             Attending ED Notes:    I discussed the patient's care with Advanced Practice Clinician.  I reviewed their note and agree with the history, physical, assessment, diagnosis, treatment, and discharge plan provided by the Advanced Practice Clinician.  Thankfully  KLARISSA Jin, who used to practice orthopedics properly adjusted her sling and swath to where she can follow-up with orthopedist.  She is much more comfortable at this time  I, Miracle Robertson PA-C, personally performed the services described in this documentation. All medical record entries made by the scribe were at my direction and in my presence.  I have reviewed the chart and agree that the record reflects my personal performance and is accurate and complete. Miracle Robertson PA-C.  10:55 PM 03/17/2018             Clinical Impression:   No diagnosis found.    Disposition:   Disposition: Discharged  Condition: Stable       1. Encounter for post surgical wound check    2. Aftercare for fitting and adjustment of brace                       Miracle Robertson PA-C  03/17/18 6465

## 2018-03-18 ENCOUNTER — HOSPITAL ENCOUNTER (EMERGENCY)
Facility: HOSPITAL | Age: 58
Discharge: HOME | End: 2018-03-18
Payer: MEDICARE

## 2018-03-18 VITALS
RESPIRATION RATE: 18 BRPM | HEART RATE: 62 BPM | TEMPERATURE: 99 F | BODY MASS INDEX: 28.22 KG/M2 | HEIGHT: 59 IN | DIASTOLIC BLOOD PRESSURE: 98 MMHG | SYSTOLIC BLOOD PRESSURE: 220 MMHG | OXYGEN SATURATION: 98 % | WEIGHT: 140 LBS

## 2018-03-18 DIAGNOSIS — Z48.89 ENCOUNTER FOR POST SURGICAL WOUND CHECK: Primary | ICD-10-CM

## 2018-03-18 PROCEDURE — 99282 EMERGENCY DEPT VISIT SF MDM: CPT

## 2018-03-18 NOTE — PROGRESS NOTES
[facility]  POD#2  Inter[facility]  No answer of follow up call. Message recorded requesting F/U as needed.

## 2018-03-18 NOTE — ED PROVIDER NOTES
Encounter Date: 3/18/2018    SCRIBE #1 NOTE: Christopher HODGES, am scribing for, and in the presence of, Miracle Thapa PA-C.       History     Chief Complaint   Patient presents with    Post-op Problem     drainage from incision / left shoulder / rotator cuff repair        03/18/2018 11:21 AM     Chief complaint: Pain pump complications       Steff Johnson is a 58 y.o. female status post left shoulder surgery who presents to the ED for pain pump complications with an onset of 6 hours. Patient reports that she felt a wetness to her back this am, upon further investigation she realized that her pain pump was leaking. She denies any new acute pain. Patient reports that she is also having skin irration from the tape around her neck. Patient denies fever, nausea, vomiting, erythema, and edema.          The history is provided by the patient.     Review of patient's allergies indicates:  No Known Allergies  Past Medical History:   Diagnosis Date    Arthritis     COPD (chronic obstructive pulmonary disease)     Diabetes mellitus     Hypertension     Wears glasses      Past Surgical History:   Procedure Laterality Date    COLONOSCOPY N/A 4/11/2017    Procedure: COLONOSCOPY;  Surgeon: Jared Antonio MD;  Location: [facility];  Service: Endoscopy;  Laterality: N/A;    HYSTERECTOMY      OOPHORECTOMY      SHOULDER SURGERY      R     Family History   Problem Relation Age of Onset    Diabetes Mother     Asthma Mother     Hypertension Mother     Diabetes Father     Diabetes Brother     Hypertension Brother     Anemia Daughter      Social History   Substance Use Topics    Smoking status: Never Smoker    Smokeless tobacco: Never Used    Alcohol use No     Review of Systems   Constitutional: Negative for chills and fever.        +pain pump complications   HENT: Negative for facial swelling and trouble swallowing.    Eyes: Negative for discharge.   Respiratory: Negative for cough, chest tightness, shortness  of breath and wheezing.    Cardiovascular: Negative for chest pain and palpitations.   Gastrointestinal: Negative for abdominal pain, diarrhea, nausea and vomiting.   Genitourinary: Negative for dysuria and hematuria.   Musculoskeletal: Negative for arthralgias, back pain, joint swelling, myalgias, neck pain and neck stiffness.   Skin: Negative for color change, pallor, rash and wound.   Neurological: Negative for dizziness, syncope, weakness, light-headedness, numbness and headaches.   Hematological: Does not bruise/bleed easily.   Psychiatric/Behavioral: The patient is not nervous/anxious.    All other systems reviewed and are negative.      Physical Exam     Initial Vitals [03/18/18 1108]   BP Pulse Resp Temp SpO2   (!) 220/98 62 18 98.6 °F (37 °C) 98 %      MAP       138.67         Physical Exam    Nursing note and vitals reviewed.  Constitutional: She appears well-developed and well-nourished. She is not diaphoretic. No distress.   HENT:   Head: Normocephalic and atraumatic.   Eyes: Conjunctivae are normal.   Neck: Normal range of motion. Neck supple.   Cardiovascular: Normal rate, regular rhythm, normal heart sounds and intact distal pulses. Exam reveals no gallop and no friction rub.    No murmur heard.  Pulmonary/Chest: Breath sounds normal. No respiratory distress. She has no wheezes. She has no rhonchi. She has no rales.   Musculoskeletal: Normal range of motion. She exhibits no edema.   Lymphadenopathy:     She has no cervical adenopathy.   Neurological: She is alert and oriented to person, place, and time. She has normal strength. No sensory deficit.   Skin: Skin is warm and dry. No rash and no abscess noted. No erythema.   Healing surgical wound noted with intact sutures to left shoulder. Pain pump catheter dislodged.  No surrounding erythema.  No active bleeding or discharge.       Psychiatric: She has a normal mood and affect.         ED Course   Procedures  Labs Reviewed - No data to display           Medical Decision Making:   History:   Old Medical Records: I decided to obtain old medical records.       APC / Resident Notes:   Incisions are healing well.  No evidence for cellulitis or wound dehiscence at this time.  Drainage was likely from dislodged pain pump catheter.  Pain pump is removed.  New bandages are applied to the sutures.  Sling and swath is reapplied.  She is discharged home to follow-up with her orthopedic surgeon for reevaluation this week.  She voices understanding and is agreeable to the plan.  She is given specific return precautions.       Scribe Attestation:   Scribe #1: I performed the above scribed service and the documentation accurately describes the services I performed. I attest to the accuracy of the note.    Attending Attestation:     Physician Attestation Statement for NP/PA:   I discussed this assessment and plan of this patient with the NP/PA, but I did not personally examine the patient. The face to face encounter was performed by the NP/PA.          I, Miracle Robertson PA-C, personally performed the services described in this documentation. All medical record entries made by the scribe were at my direction and in my presence.  I have reviewed the chart and agree that the record reflects my personal performance and is accurate and complete. Miracle Robertson PA-C.  10:41 PM 03/18/2018             Clinical Impression:   The encounter diagnosis was Encounter for post surgical wound check.  1. Encounter for post surgical wound check        Disposition:   Disposition: Discharged  Condition: Stable                        Miracle Robertson PA-C  03/18/18 2241       Ambrose Oshea MD  03/21/18 0809

## 2018-03-18 NOTE — ED NOTES
Pt arrived to ED complaining of drainage from surgical site to left shoulder. Pt had epidural of pain medicine that wasn't intact and the medicine was draining on patient's skin and clothes. Surgical sites looks good, no redness, swelling or drainage noted. New dressing applied (adaptic, abd pad, and medapore tape). Arm sling placed back on patient. Pt states she didn't take BP meds today but will when she gets home. AAO x3. Ambulates with steady gait. States pain is under control with pills prescribed.

## 2018-03-19 VITALS
SYSTOLIC BLOOD PRESSURE: 171 MMHG | RESPIRATION RATE: 16 BRPM | TEMPERATURE: 98 F | HEIGHT: 59 IN | BODY MASS INDEX: 28.22 KG/M2 | OXYGEN SATURATION: 95 % | WEIGHT: 140 LBS | DIASTOLIC BLOOD PRESSURE: 86 MMHG | HEART RATE: 58 BPM

## 2018-03-22 ENCOUNTER — TELEPHONE (OUTPATIENT)
Dept: FAMILY MEDICINE | Facility: CLINIC | Age: 58
End: 2018-03-22

## 2018-03-22 NOTE — TELEPHONE ENCOUNTER
PA requested for patients Lantus. [facility] is no longer covering this medication. Medication changed to Basaglar (generic) of Lantus and Levemir.

## 2018-03-23 NOTE — ADDENDUM NOTE
Addendum  created 03/23/18 0740 by Gurpreet Barnes MD    Anesthesia Attestations filed, Anesthesia Intra Blocks edited, Sign clinical note

## 2018-03-26 ENCOUNTER — OFFICE VISIT (OUTPATIENT)
Dept: ORTHOPEDICS | Facility: CLINIC | Age: 58
End: 2018-03-26
Payer: MEDICARE

## 2018-03-26 VITALS
HEIGHT: 59 IN | SYSTOLIC BLOOD PRESSURE: 185 MMHG | DIASTOLIC BLOOD PRESSURE: 80 MMHG | HEART RATE: 88 BPM | BODY MASS INDEX: 28.22 KG/M2 | WEIGHT: 140 LBS

## 2018-03-26 DIAGNOSIS — M75.122 COMPLETE TEAR OF LEFT ROTATOR CUFF: Primary | ICD-10-CM

## 2018-03-26 PROCEDURE — 99999 PR PBB SHADOW E&M-EST. PATIENT-LVL III: CPT | Mod: PBBFAC,,, | Performed by: ORTHOPAEDIC SURGERY

## 2018-03-26 PROCEDURE — 99024 POSTOP FOLLOW-UP VISIT: CPT | Mod: S$GLB,,, | Performed by: ORTHOPAEDIC SURGERY

## 2018-03-26 RX ORDER — OXYCODONE AND ACETAMINOPHEN 5; 325 MG/1; MG/1
1 TABLET ORAL EVERY 6 HOURS PRN
Qty: 40 TABLET | Refills: 0 | Status: SHIPPED | OUTPATIENT
Start: 2018-03-26 | End: 2018-04-06 | Stop reason: SDUPTHER

## 2018-03-26 NOTE — PROGRESS NOTES
Past Medical History:   Diagnosis Date    Arthritis     COPD (chronic obstructive pulmonary disease)     Diabetes mellitus     Hypertension     Wears glasses        Past Surgical History:   Procedure Laterality Date    COLONOSCOPY N/A 4/11/2017    Procedure: COLONOSCOPY;  Surgeon: Jared Antonio MD;  Location: [facility];  Service: Endoscopy;  Laterality: N/A;    HYSTERECTOMY      OOPHORECTOMY      SHOULDER SURGERY      R       Current Outpatient Prescriptions   Medication Sig    amlodipine-valsartan (EXFORGE)  mg per tablet Take 1 tablet by mouth once daily.    blood sugar diagnostic Strp 1 strip four times daily before meals and bedtime. True Metrix or equivalent covered by insurance. Diagnosis: E11.65    blood-glucose meter (TRUE METRIX GLUCOSE METER) Misc 1 each by Misc.(Non-Drug; Combo Route) route As instructed (Diagnosis E11.65).    clotrimazole-betamethasone 1-0.05% (LOTRISONE) cream Apply topically 2 (two) times daily.    gabapentin (NEURONTIN) 300 MG capsule TK 1 C PO HS    hydrALAZINE (APRESOLINE) 50 MG tablet Take 2 tablets (100 mg total) by mouth every 12 (twelve) hours. (Patient taking differently: Take 100 mg by mouth every 12 (twelve) hours. TAKES AM ONLY)    lancets 33 gauge Misc 1 lancet by Misc.(Non-Drug; Combo Route) route 4 (four) times daily before meals and nightly. For True Metrix system. Diagnosis: E11.65    LANTUS SOLOSTAR 100 unit/mL (3 mL) InPn pen ADMINISTER 30 UNITS UNDER THE SKIN TWICE DAILY (Patient taking differently: ADMINISTER 33 UNITS UNDER THE SKIN TWICE DAILY)    lidocaine-prilocaine (EMLA) cream as needed.     metFORMIN (GLUCOPHAGE-XR) 500 MG 24 hr tablet Take 1 tablet (500 mg total) by mouth 2 (two) times daily with meals. 2 TABLETS TWICE DAILY WITH MEALS- Pt taking 1 tablet twice a day    metoprolol succinate (TOPROL-XL) 200 MG 24 hr tablet Take 1 tablet (200 mg total) by mouth once daily.    oxyCODONE-acetaminophen (PERCOCET) 5-325 mg per  "tablet Take 1 tablet by mouth every 6 (six) hours as needed for Pain.    pen needle, diabetic (PEN NEEDLE) 30 gauge x 5/16" Ndle Inject 2 x /day    promethazine (PHENERGAN) 25 MG tablet Take 1 tablet (25 mg total) by mouth every 8 (eight) hours as needed for Nausea.    spironolactone (ALDACTONE) 25 MG tablet Take 1 tablet (25 mg total) by mouth every morning.    tiotropium (SPIRIVA WITH HANDIHALER) 18 mcg inhalation capsule Inhale 1 capsule (18 mcg total) into the lungs once daily.     No current facility-administered medications for this visit.        Review of patient's allergies indicates:  No Known Allergies    Family History   Problem Relation Age of Onset    Diabetes Mother     Asthma Mother     Hypertension Mother     Diabetes Father     Diabetes Brother     Hypertension Brother     Anemia Daughter        Social History     Social History    Marital status: Single     Spouse name: N/A    Number of children: N/A    Years of education: N/A     Occupational History          disabled due to shoulder problems     Social History Main Topics    Smoking status: Never Smoker    Smokeless tobacco: Never Used    Alcohol use No    Drug use: No    Sexual activity: Not Currently     Other Topics Concern    Not on file     Social History Narrative    No narrative on file       Chief Complaint:   Chief Complaint   Patient presents with    Post-op Evaluation     s/p left shoulder RC Repair 3/16/18        Date of surgery: March 16, 2018    History of present illness: 58-year-old female underwent left rotator cuff repair for a massive retracted rotator cuff tear.  Pain is an 8 out of 10.  Patient had a suture bridge technique performed.  We used suture tape.      Review of Systems:    Musculoskeletal:  See HPI        Physical Examination:    Vital Signs:    Vitals:    03/26/18 0859   BP: (!) 185/80   Pulse: 88       Body mass index is 28.28 kg/m².    This a well-developed, well nourished patient in no " acute distress.  They are alert and oriented and cooperative to examination.  Pt. walks without an antalgic gait.      Examination left shoulder shows well-healing surgical portals.  No erythema or drainage.  No bruising.    X-rays: None     Assessment:: Status post suture bridge rotator cuff repair for massive tear    Plan:  I reviewed the findings with her today.  We talked about taking extra care of this given the large tear.  Instructed her in some very mild pendulum exercises.  Refilled her pain medicine.  Follow-up in 4 weeks.    This note was created using Dragon voice recognition software that occasionally misinterpreted phrases or words.

## 2018-04-05 DIAGNOSIS — Z79.4 TYPE 2 DIABETES MELLITUS WITH HYPERGLYCEMIA, WITH LONG-TERM CURRENT USE OF INSULIN: Primary | ICD-10-CM

## 2018-04-05 DIAGNOSIS — E11.65 TYPE 2 DIABETES MELLITUS WITH HYPERGLYCEMIA, WITH LONG-TERM CURRENT USE OF INSULIN: Primary | ICD-10-CM

## 2018-04-06 RX ORDER — OXYCODONE AND ACETAMINOPHEN 5; 325 MG/1; MG/1
1 TABLET ORAL EVERY 6 HOURS PRN
Qty: 40 TABLET | Refills: 0 | Status: SHIPPED | OUTPATIENT
Start: 2018-04-06 | End: 2018-04-23 | Stop reason: SDUPTHER

## 2018-04-06 NOTE — TELEPHONE ENCOUNTER
----- Message from Milka Bolanos sent at 4/6/2018 12:12 PM CDT -----  Contact: self  Patient called regarding refill of medication, oxyCODONE-acetaminophen (PERCOCET) 5-325 mg per tablet , stating she will be out before her appt on 4/23/18. Please contact [phone] (home)       [facility] [address] [place] [address] & [address] [address]  Phone: [phone] Fax: [phone]

## 2018-04-17 ENCOUNTER — TELEPHONE (OUTPATIENT)
Dept: FAMILY MEDICINE | Facility: CLINIC | Age: 58
End: 2018-04-17

## 2018-04-17 ENCOUNTER — DOCUMENTATION ONLY (OUTPATIENT)
Dept: FAMILY MEDICINE | Facility: CLINIC | Age: 58
End: 2018-04-17

## 2018-04-17 NOTE — TELEPHONE ENCOUNTER
----- Message from Marli Drummond LPN sent at 4/14/2018 10:06 AM CDT -----      ----- Message -----  From: Kathy Orozco MA  Sent: 4/13/2018   1:15 PM  To: Maril Drummond LPN        ----- Message -----  From: Jaelyn Waters  Sent: 4/12/2018  12:09 PM  To: Mikal ESPARZA [facility] calling concerning medication appeal for medication: Lantus/jolene was denied/will send over denial letter. If questions call back [phone].

## 2018-04-17 NOTE — PROGRESS NOTES
Pre-Visit Chart Review  For Appointment Scheduled on 4-18-18    Health Maintenance Due   Topic Date Due    TETANUS VACCINE  01/08/1976    Eye Exam  08/01/2017    Zoster Vaccine  01/08/2018    Foot Exam  02/14/2018

## 2018-04-18 ENCOUNTER — TELEPHONE (OUTPATIENT)
Dept: FAMILY MEDICINE | Facility: CLINIC | Age: 58
End: 2018-04-18

## 2018-04-18 ENCOUNTER — OFFICE VISIT (OUTPATIENT)
Dept: FAMILY MEDICINE | Facility: CLINIC | Age: 58
End: 2018-04-18
Payer: MEDICARE

## 2018-04-18 VITALS
DIASTOLIC BLOOD PRESSURE: 82 MMHG | TEMPERATURE: 98 F | HEART RATE: 68 BPM | WEIGHT: 138.38 LBS | BODY MASS INDEX: 27.9 KG/M2 | SYSTOLIC BLOOD PRESSURE: 148 MMHG | HEIGHT: 59 IN

## 2018-04-18 DIAGNOSIS — M75.122 COMPLETE TEAR OF LEFT ROTATOR CUFF: ICD-10-CM

## 2018-04-18 DIAGNOSIS — M62.838 MUSCLE SPASM: ICD-10-CM

## 2018-04-18 DIAGNOSIS — J44.9 CHRONIC OBSTRUCTIVE PULMONARY DISEASE, UNSPECIFIED COPD TYPE: Primary | ICD-10-CM

## 2018-04-18 DIAGNOSIS — E11.65 TYPE 2 DIABETES MELLITUS WITH HYPERGLYCEMIA, WITH LONG-TERM CURRENT USE OF INSULIN: ICD-10-CM

## 2018-04-18 DIAGNOSIS — E78.5 HYPERLIPIDEMIA ASSOCIATED WITH TYPE 2 DIABETES MELLITUS: ICD-10-CM

## 2018-04-18 DIAGNOSIS — E11.69 HYPERLIPIDEMIA ASSOCIATED WITH TYPE 2 DIABETES MELLITUS: ICD-10-CM

## 2018-04-18 DIAGNOSIS — Z79.4 TYPE 2 DIABETES MELLITUS WITH HYPERGLYCEMIA, WITH LONG-TERM CURRENT USE OF INSULIN: ICD-10-CM

## 2018-04-18 DIAGNOSIS — I10 HYPERTENSION, UNSPECIFIED TYPE: ICD-10-CM

## 2018-04-18 PROCEDURE — 99499 UNLISTED E&M SERVICE: CPT | Mod: S$GLB,,, | Performed by: FAMILY MEDICINE

## 2018-04-18 PROCEDURE — 3079F DIAST BP 80-89 MM HG: CPT | Mod: CPTII,S$GLB,, | Performed by: FAMILY MEDICINE

## 2018-04-18 PROCEDURE — 99214 OFFICE O/P EST MOD 30 MIN: CPT | Mod: S$GLB,,, | Performed by: FAMILY MEDICINE

## 2018-04-18 PROCEDURE — 3077F SYST BP >= 140 MM HG: CPT | Mod: CPTII,S$GLB,, | Performed by: FAMILY MEDICINE

## 2018-04-18 PROCEDURE — 3046F HEMOGLOBIN A1C LEVEL >9.0%: CPT | Mod: CPTII,S$GLB,, | Performed by: FAMILY MEDICINE

## 2018-04-18 PROCEDURE — 99999 PR PBB SHADOW E&M-EST. PATIENT-LVL III: CPT | Mod: PBBFAC,,, | Performed by: FAMILY MEDICINE

## 2018-04-18 RX ORDER — TIZANIDINE 4 MG/1
4 TABLET ORAL EVERY 6 HOURS PRN
Qty: 30 TABLET | Refills: 1 | Status: SHIPPED | OUTPATIENT
Start: 2018-04-18 | End: 2018-04-18 | Stop reason: SDUPTHER

## 2018-04-18 RX ORDER — INSULIN DEGLUDEC 100 U/ML
60 INJECTION, SOLUTION SUBCUTANEOUS DAILY
Qty: 6 SYRINGE | Refills: 5 | Status: SHIPPED | OUTPATIENT
Start: 2018-04-18 | End: 2018-07-19 | Stop reason: SDUPTHER

## 2018-04-18 RX ORDER — TIZANIDINE 4 MG/1
TABLET ORAL
Qty: 385 TABLET | Refills: 1 | Status: SHIPPED | OUTPATIENT
Start: 2018-04-18 | End: 2018-04-19 | Stop reason: SDUPTHER

## 2018-04-18 NOTE — LETTER
April 18, 2018                 [facility]  Family Medicine  [address] [place] [address]  Phone: [phone]  Fax: [phone]   April 18, 2018     Patient: Steff Johnson   YOB: 1960   Date of Visit: 4/18/2018       To Whom it May Concern:    Steff Johnson was seen in my clinic on 4/18/2018. Her granddaughter, Erasmo Angel,  accompanied her for medical assistance.   She may return to school on 4/19/18.    If you have any questions or concerns, please don't hesitate to call.    Sincerely,         Cristina Ren MD

## 2018-04-18 NOTE — PROGRESS NOTES
Subjective:       Patient ID: Steff Johnson is a 58 y.o. female.    Chief Complaint: Follow-up    Patient Active Problem List   Diagnosis    Hypertension    COPD (chronic obstructive pulmonary disease)    Type 2 diabetes mellitus with hyperglycemia, with long-term current use of insulin    Proteinuria    Complete tear of left rotator cuff   Had surgery repair torn rotator cuff left 3/16/18 Dr. Sanchez. Taking percocet 5  Or norco 10 tid .  Still in a lot of pain.  Hurts in left upper back nik when lays down at night.    Lantus not covered by insurance.  tresiba preferred.  BS ranging < 140 average  HPI  Review of Systems   Constitutional: Negative for fatigue and unexpected weight change.   Respiratory: Negative for chest tightness and shortness of breath.    Cardiovascular: Negative for chest pain, palpitations and leg swelling.   Gastrointestinal: Negative for abdominal pain.   Musculoskeletal: Positive for arthralgias.   Neurological: Negative for dizziness, syncope, light-headedness and headaches.       Objective:      Physical Exam   Constitutional: She is oriented to person, place, and time. She appears well-developed and well-nourished.   Cardiovascular: Normal rate, regular rhythm and normal heart sounds.    Pulmonary/Chest: Effort normal and breath sounds normal.   Musculoskeletal: She exhibits no edema.   Neurological: She is alert and oriented to person, place, and time.   Skin: Skin is warm and dry.   Psychiatric: She has a normal mood and affect.   Nursing note and vitals reviewed.      Assessment:       1. Chronic obstructive pulmonary disease, unspecified COPD type    2. Hypertension, unspecified type    3. Type 2 diabetes mellitus with hyperglycemia, with long-term current use of insulin    4. Complete tear of left rotator cuff    5. Hyperlipidemia associated with type 2 diabetes mellitus    6. Muscle spasm        Plan:       1. Chronic obstructive pulmonary disease, unspecified COPD  type  Controlled on current medications.  Continue current medications.      2. Hypertension, unspecified type  Much improved!.  Close to goal.  Continue current meds and will continue to reassess as should pain better controlled    3. Type 2 diabetes mellitus with hyperglycemia, with long-term current use of insulin  Change lantus to tresiba   - CBC auto differential; Future  - Comprehensive metabolic panel; Future  - Hemoglobin A1c; Future  - insulin degludec 100 unit/mL (3 mL) InPn; Inject 60 Units into the skin once daily.  Dispense: 6 Syringe; Refill: 5    4. Complete tear of left rotator cuff  Cont ortho care    5. Hyperlipidemia associated with type 2 diabetes mellitus  Stable condition.  Continue current medications.  Will adjust based on lab findings or if condition changes.    - Lipid panel; Future    6. Muscle spasm  Home Care:  1. You may need to stay in bed the first few days. But, as soon as possible, begin sitting or walking to avoid problems with prolonged bed rest (muscle weakness, worsening back stiffness and pain, blood clots in the legs).  2. When in bed, try to find a position of comfort. A firm mattress is best. Try lying flat on your back with pillows under your knees. You can also try lying on your side with your knees bent up towards your chest and a pillow between your knees.  3. Avoid prolonged sitting. This puts more stress on the lower back than standing or walking.  4. During the first two days after injury, apply an ICE PACK to the painful area for 20 minutes every 2-4 hours. This will reduce swelling and pain. HEAT (hot shower, hot bath or heating pad) works well for muscle spasm. You can start with ice, then switch to heat after two days. Some patients feel best alternating ice and heat treatments. Use the one method that feels the best to you.  5. You may use acetaminophen (Tylenol) or ibuprofen (Motrin, Advil) to control pain, unless another pain medicine was prescribed. [NOTE: If  you have chronic liver or kidney disease or ever had a stomach ulcer or GI bleeding, talk with your doctor before using these medicines.]  6. Be aware of safe lifting methods and do not lift anything over 15 pounds until all the pain is gone    Add  - tiZANidine (ZANAFLEX) 4 MG tablet; Take 1 tablet (4 mg total) by mouth every 6 (six) hours as needed (muscle spasm).  Dispense: 30 tablet; Refill: 1    [facility] goal documentation:  Patient readiness: acceptance and barriers:readiness  During the course of the visit the patient was educated and counseled about the following: Diabetes:  Discussed general issues about diabetes pathophysiology and management.  Hypertension:   Dietary sodium restriction.  Regular aerobic exercise.  Goals: Diabetes: Maintain Hemoglobin A1C below 7 and Hypertension: Reduce Blood Pressure  Goal/Outcomes met:Hypertension and type 2 DM  The following self management tools provided:Blood sugar log  Patient Instructions (the written plan) was given to the patient/family: Yes  Time spent with patient: 20 minutes    Patient with be reevaluated in 4 months or sooner prn. 4 weeks KLARISSA Queen for chronic conditions check up.    Greater than 50% of this visit was spent counseling as described in above documentation:Yes

## 2018-04-18 NOTE — TELEPHONE ENCOUNTER
Call [facility] and clarify zanaflex is for tid prn #90 no refills not chronic medication).  [facility] rx for #385

## 2018-04-19 RX ORDER — TIZANIDINE 4 MG/1
TABLET ORAL
Qty: 30 TABLET | Refills: 1 | Status: SHIPPED | OUTPATIENT
Start: 2018-04-19 | End: 2019-12-12

## 2018-04-19 NOTE — TELEPHONE ENCOUNTER
Spoke with the pharmacist. She stated that the only Rx they received yesterday was the Zanaflex 4mg order #30 with 1 RF which was d/c'd. The second order they did not receive for # 385 at all.

## 2018-04-23 ENCOUNTER — OFFICE VISIT (OUTPATIENT)
Dept: ORTHOPEDICS | Facility: CLINIC | Age: 58
End: 2018-04-23
Payer: MEDICARE

## 2018-04-23 VITALS
HEART RATE: 65 BPM | HEIGHT: 59 IN | SYSTOLIC BLOOD PRESSURE: 210 MMHG | BODY MASS INDEX: 27.82 KG/M2 | WEIGHT: 138 LBS | DIASTOLIC BLOOD PRESSURE: 94 MMHG

## 2018-04-23 DIAGNOSIS — M75.102 TEAR OF LEFT ROTATOR CUFF, UNSPECIFIED TEAR EXTENT: Primary | ICD-10-CM

## 2018-04-23 DIAGNOSIS — M75.122 COMPLETE TEAR OF LEFT ROTATOR CUFF: Primary | ICD-10-CM

## 2018-04-23 PROCEDURE — 99024 POSTOP FOLLOW-UP VISIT: CPT | Mod: S$GLB,,, | Performed by: ORTHOPAEDIC SURGERY

## 2018-04-23 PROCEDURE — 99999 PR PBB SHADOW E&M-EST. PATIENT-LVL III: CPT | Mod: PBBFAC,,, | Performed by: ORTHOPAEDIC SURGERY

## 2018-04-23 RX ORDER — OXYCODONE AND ACETAMINOPHEN 5; 325 MG/1; MG/1
1 TABLET ORAL EVERY 6 HOURS PRN
Qty: 40 TABLET | Refills: 0 | Status: SHIPPED | OUTPATIENT
Start: 2018-04-23 | End: 2018-05-04 | Stop reason: SDUPTHER

## 2018-04-24 ENCOUNTER — LAB VISIT (OUTPATIENT)
Dept: LAB | Facility: HOSPITAL | Age: 58
End: 2018-04-24
Attending: FAMILY MEDICINE
Payer: MEDICARE

## 2018-04-24 ENCOUNTER — CLINICAL SUPPORT (OUTPATIENT)
Dept: REHABILITATION | Facility: HOSPITAL | Age: 58
End: 2018-04-24
Attending: ORTHOPAEDIC SURGERY
Payer: MEDICARE

## 2018-04-24 DIAGNOSIS — E78.5 HYPERLIPIDEMIA ASSOCIATED WITH TYPE 2 DIABETES MELLITUS: ICD-10-CM

## 2018-04-24 DIAGNOSIS — Z79.4 TYPE 2 DIABETES MELLITUS WITH HYPERGLYCEMIA, WITH LONG-TERM CURRENT USE OF INSULIN: ICD-10-CM

## 2018-04-24 DIAGNOSIS — E11.65 TYPE 2 DIABETES MELLITUS WITH HYPERGLYCEMIA, WITH LONG-TERM CURRENT USE OF INSULIN: ICD-10-CM

## 2018-04-24 DIAGNOSIS — E11.69 HYPERLIPIDEMIA ASSOCIATED WITH TYPE 2 DIABETES MELLITUS: ICD-10-CM

## 2018-04-24 DIAGNOSIS — Z98.890 S/P LEFT ROTATOR CUFF REPAIR: ICD-10-CM

## 2018-04-24 DIAGNOSIS — M75.122 COMPLETE TEAR OF LEFT ROTATOR CUFF: Primary | ICD-10-CM

## 2018-04-24 LAB
ALBUMIN SERPL BCP-MCNC: 3.4 G/DL
ALP SERPL-CCNC: 108 U/L
ALT SERPL W/O P-5'-P-CCNC: 18 U/L
ANION GAP SERPL CALC-SCNC: 7 MMOL/L
AST SERPL-CCNC: 19 U/L
BASOPHILS # BLD AUTO: 0.04 K/UL
BASOPHILS NFR BLD: 0.5 %
BILIRUB SERPL-MCNC: 0.6 MG/DL
BUN SERPL-MCNC: 12 MG/DL
CALCIUM SERPL-MCNC: 9.3 MG/DL
CHLORIDE SERPL-SCNC: 103 MMOL/L
CHOLEST SERPL-MCNC: 178 MG/DL
CHOLEST/HDLC SERPL: 2.5 {RATIO}
CO2 SERPL-SCNC: 28 MMOL/L
CREAT SERPL-MCNC: 0.9 MG/DL
DIFFERENTIAL METHOD: ABNORMAL
EOSINOPHIL # BLD AUTO: 0.1 K/UL
EOSINOPHIL NFR BLD: 1.5 %
ERYTHROCYTE [DISTWIDTH] IN BLOOD BY AUTOMATED COUNT: 14.3 %
EST. GFR  (AFRICAN AMERICAN): >60 ML/MIN/1.73 M^2
EST. GFR  (NON AFRICAN AMERICAN): >60 ML/MIN/1.73 M^2
ESTIMATED AVG GLUCOSE: 229 MG/DL
GLUCOSE SERPL-MCNC: 168 MG/DL
HBA1C MFR BLD HPLC: 9.6 %
HCT VFR BLD AUTO: 35.9 %
HDLC SERPL-MCNC: 70 MG/DL
HDLC SERPL: 39.3 %
HGB BLD-MCNC: 12.4 G/DL
IMM GRANULOCYTES # BLD AUTO: 0.04 K/UL
IMM GRANULOCYTES NFR BLD AUTO: 0.5 %
LDLC SERPL CALC-MCNC: 92.2 MG/DL
LYMPHOCYTES # BLD AUTO: 2.4 K/UL
LYMPHOCYTES NFR BLD: 28.8 %
MCH RBC QN AUTO: 27.1 PG
MCHC RBC AUTO-ENTMCNC: 34.5 G/DL
MCV RBC AUTO: 79 FL
MONOCYTES # BLD AUTO: 0.7 K/UL
MONOCYTES NFR BLD: 8.3 %
NEUTROPHILS # BLD AUTO: 5.1 K/UL
NEUTROPHILS NFR BLD: 60.4 %
NONHDLC SERPL-MCNC: 108 MG/DL
NRBC BLD-RTO: 0 /100 WBC
PLATELET # BLD AUTO: 405 K/UL
PMV BLD AUTO: 10.9 FL
POTASSIUM SERPL-SCNC: 4 MMOL/L
PROT SERPL-MCNC: 7.3 G/DL
RBC # BLD AUTO: 4.57 M/UL
SODIUM SERPL-SCNC: 138 MMOL/L
TRIGL SERPL-MCNC: 79 MG/DL
WBC # BLD AUTO: 8.43 K/UL

## 2018-04-24 PROCEDURE — G8987 SELF CARE CURRENT STATUS: HCPCS | Mod: CK,PN

## 2018-04-24 PROCEDURE — 36415 COLL VENOUS BLD VENIPUNCTURE: CPT | Mod: PO

## 2018-04-24 PROCEDURE — G8988 SELF CARE GOAL STATUS: HCPCS | Mod: CH,PN

## 2018-04-24 PROCEDURE — 85025 COMPLETE CBC W/AUTO DIFF WBC: CPT

## 2018-04-24 PROCEDURE — 97110 THERAPEUTIC EXERCISES: CPT | Mod: PN

## 2018-04-24 PROCEDURE — 97165 OT EVAL LOW COMPLEX 30 MIN: CPT | Mod: PN

## 2018-04-24 PROCEDURE — 83036 HEMOGLOBIN GLYCOSYLATED A1C: CPT

## 2018-04-24 PROCEDURE — 80053 COMPREHEN METABOLIC PANEL: CPT

## 2018-04-24 PROCEDURE — 80061 LIPID PANEL: CPT

## 2018-04-26 NOTE — PLAN OF CARE
TIME RECORD    Date: 04/24/2018    Start Time:  1010  Stop Time:  1100    PROCEDURES:    TIMED  Procedure Time Min.   exercise Start:1030  Stop:1100 30    Start:  Stop:     Start:  Stop:     Start:  Stop:          UNTIMED  Procedure Time Min.   evaluation Start:1010  Stop:1030 20    Start:  Stop:      Total Timed Minutes:  30  Total Timed Units:  2  Total Untimed Units:  1  Charges Billed/# of units:  3    OCCUPATIONAL THERAPY INITIAL EVALUATION & PLAN OF TREATMENT    Patient Name: Steff Johnson  Physician Name:  Kaylee  Primary Diagnosis:  L complete rotator cuff repair  Treatment Diagnosis:  S/p repair  Onset Date:  3-16-18  Eval Date:  4-24-18  Certification Period:  4-24-18 to 7-24-18  Past Medical History:   Past Medical History:   Diagnosis Date    Arthritis     COPD (chronic obstructive pulmonary disease)     Diabetes mellitus     Hypertension     Wears glasses      Precautions:  Universal and per post op protocol  Prior Therapy:  no  Signs of Abuse: no  Medications: Steff Johnson has a current medication list which includes the following prescription(s): amlodipine-valsartan, blood sugar diagnostic, blood-glucose meter, clotrimazole-betamethasone 1-0.05%, gabapentin, hydralazine, insulin degludec, lancets, lidocaine-prilocaine, metformin, metoprolol succinate, oxycodone-acetaminophen, pen needle, diabetic, promethazine, spironolactone, tiotropium, and tizanidine.  Nutrition:  WNL  Social Cultural Assessment:  Lives with family. 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  Initial Assessment:  Patient reports R UE rotator cuff repair 5 years ago with no therapy follow up. Limited AROM R UE noted. Patient reports 8/10 pain upon arrival. Patient reports that physician told her to remove  sling/support but to use PRN. Patient arrived without sling.   Per RCR protocol: PROM shoulder flexion 60 degrees                                 PROM shoulder abduction 60 degrees                                 ER 25 degrees                                 IR not tested at this appt.  Pt educated in PROM L UE including all planes tested above. Gave patient illustrated exercises. Instructed patient in pendulum exercises as well. Pt was able to demonstrate all exercises correctly.  Rehab Potential: good  Short Term Goals 1.WFL PROM L shoulder 2. Pain reduced at rest from 8/10 to 2/10 3. Independent and compliant with HEP  Long Term Goals 1. Independent and compliant with HEP 2. WFL AROM L UE 3. 2/10 pain with activity 4. Independent self care.  Recommended Treatment Plan (2 times per week for 12 weeks): Therapeutic Exercise, Functional Activities, Patient Education, Home Exercise Program, Moist Heat/Ice and Manual Therapy      Therapist's Name: SAHARA Ware   Date: 04/26/2018    I CERTIFY THE NEED FOR THESE SERVICES FURNISHED UNDER THIS PLAN OF TREATMENT AND WHILE UNDER MY CARE    Physician's comments: ________________________________________________________________________________________________________________________________________________      Physician's Name: ___________________________________

## 2018-05-04 RX ORDER — OXYCODONE AND ACETAMINOPHEN 5; 325 MG/1; MG/1
1 TABLET ORAL EVERY 6 HOURS PRN
Qty: 40 TABLET | Refills: 0 | Status: SHIPPED | OUTPATIENT
Start: 2018-05-04 | End: 2018-05-14 | Stop reason: SDUPTHER

## 2018-05-04 NOTE — TELEPHONE ENCOUNTER
----- Message from Jannette Monique sent at 5/4/2018 11:04 AM CDT -----  Contact: aquiles  Can the clinic reply in [facility]: no      Please refill the medication(s) listed below. Please call the patient when the prescription(s) is ready for  at the phone number (_[phone]    Medication #1oxyCODONE-acetaminophen (PERCOCET) 5-325 mg per tablet     Medication #2      [facility]

## 2018-05-08 ENCOUNTER — TELEPHONE (OUTPATIENT)
Dept: FAMILY MEDICINE | Facility: CLINIC | Age: 58
End: 2018-05-08

## 2018-05-08 NOTE — TELEPHONE ENCOUNTER
Spoke to [facility] Have not received paperwork. They will refax and we should have it within 24 hours.

## 2018-05-08 NOTE — TELEPHONE ENCOUNTER
----- Message from Sweetie Acosta sent at 5/8/2018  8:51 AM CDT -----  Contact: Lauren with [facility]  Lauren is calling regarding the fax that was sent over last week for patient's insulin supplies.  Call Back#[phone]  Thanks

## 2018-05-14 RX ORDER — OXYCODONE AND ACETAMINOPHEN 5; 325 MG/1; MG/1
1 TABLET ORAL EVERY 6 HOURS PRN
Qty: 40 TABLET | Refills: 0 | Status: SHIPPED | OUTPATIENT
Start: 2018-05-14 | End: 2018-05-24 | Stop reason: SDUPTHER

## 2018-05-14 NOTE — TELEPHONE ENCOUNTER
----- Message from Stacey M Lefort sent at 5/14/2018 12:23 PM CDT -----  Patient would like a refill on her oxycodone script. She uses the [facility] on [address]. Just give her a call only if you are not able to refill the medication. Her number is [phone]. Thank you.

## 2018-05-15 ENCOUNTER — CLINICAL SUPPORT (OUTPATIENT)
Dept: REHABILITATION | Facility: HOSPITAL | Age: 58
End: 2018-05-15
Attending: ORTHOPAEDIC SURGERY
Payer: MEDICARE

## 2018-05-15 ENCOUNTER — DOCUMENTATION ONLY (OUTPATIENT)
Dept: FAMILY MEDICINE | Facility: CLINIC | Age: 58
End: 2018-05-15

## 2018-05-15 DIAGNOSIS — Z98.890 S/P LEFT ROTATOR CUFF REPAIR: Primary | ICD-10-CM

## 2018-05-15 PROCEDURE — 97110 THERAPEUTIC EXERCISES: CPT | Mod: PN

## 2018-05-15 RX ORDER — HYDROCODONE BITARTRATE AND ACETAMINOPHEN 10; 325 MG/1; MG/1
TABLET ORAL
Refills: 0 | COMMUNITY
Start: 2018-04-27 | End: 2018-05-24 | Stop reason: ALTCHOICE

## 2018-05-15 NOTE — PROGRESS NOTES
Pre-Visit Chart Review  For Appointment Scheduled on 5/17/2018    Health Maintenance Due   Topic Date Due    TETANUS VACCINE  01/08/1976    Eye Exam  08/01/2017    Zoster Vaccine  01/08/2018    Foot Exam  02/14/2018

## 2018-05-15 NOTE — PROGRESS NOTES
TIME RECORD    Date:  05/15/2018    Start Time:  0815  Stop Time:  0900    PROCEDURES:    TIMED  Procedure Time Min.   exercise Start:0815  Stop:0900 45    Start:  Stop:     Start:  Stop:     Start:  Stop:          UNTIMED  Procedure Time Min.    Start:  Stop:     Start:  Stop:      Total Timed Minutes:  45  Total Timed Units:  3  Total Untimed Units:  0  Charges Billed/# of units:  3      Progress/Current Status    Subjective:     Patient ID: Steff Johnson is a 58 y.o. female.  Diagnosis:   1. S/P left rotator cuff repair       Pain: 2 /10      Objective:     Session began with pt demonstration of HEP. Patient unable to demonstrate correct exercises. Was re instructed in all exercises through demonstration, handout    Assessment:     Patient admits non compliance with HEP. Instructed that she must be compliant to progress.    Patient Education/Response:     ongoing    Plans and Goals:     cont

## 2018-05-22 ENCOUNTER — CLINICAL SUPPORT (OUTPATIENT)
Dept: REHABILITATION | Facility: HOSPITAL | Age: 58
End: 2018-05-22
Attending: ORTHOPAEDIC SURGERY
Payer: MEDICARE

## 2018-05-22 DIAGNOSIS — Z98.890 S/P LEFT ROTATOR CUFF REPAIR: Primary | ICD-10-CM

## 2018-05-22 PROCEDURE — 97110 THERAPEUTIC EXERCISES: CPT | Mod: PN

## 2018-05-22 PROCEDURE — 97140 MANUAL THERAPY 1/> REGIONS: CPT | Mod: PN

## 2018-05-24 ENCOUNTER — CLINICAL SUPPORT (OUTPATIENT)
Dept: REHABILITATION | Facility: HOSPITAL | Age: 58
End: 2018-05-24
Attending: ORTHOPAEDIC SURGERY
Payer: MEDICARE

## 2018-05-24 ENCOUNTER — TELEPHONE (OUTPATIENT)
Dept: ORTHOPEDICS | Facility: CLINIC | Age: 58
End: 2018-05-24

## 2018-05-24 DIAGNOSIS — M75.122 COMPLETE TEAR OF LEFT ROTATOR CUFF: ICD-10-CM

## 2018-05-24 PROCEDURE — 97110 THERAPEUTIC EXERCISES: CPT | Mod: PN

## 2018-05-24 PROCEDURE — 97010 HOT OR COLD PACKS THERAPY: CPT | Mod: PN

## 2018-05-24 RX ORDER — OXYCODONE AND ACETAMINOPHEN 5; 325 MG/1; MG/1
1 TABLET ORAL EVERY 6 HOURS PRN
Qty: 40 TABLET | Refills: 0 | Status: CANCELLED | OUTPATIENT
Start: 2018-05-24

## 2018-05-24 RX ORDER — OXYCODONE AND ACETAMINOPHEN 5; 325 MG/1; MG/1
1 TABLET ORAL EVERY 6 HOURS PRN
Qty: 40 TABLET | Refills: 0 | Status: SHIPPED | OUTPATIENT
Start: 2018-05-24 | End: 2018-06-04 | Stop reason: SDUPTHER

## 2018-05-24 NOTE — TELEPHONE ENCOUNTER
Spoke to patient, to let her know medication was refilled and should be ready for pickup at her pharmacy.

## 2018-05-24 NOTE — TELEPHONE ENCOUNTER
Patient requesting refill on Percocet 5/325. Takes one Q6hrs PRN. Qty:40. Last refilled 5/14/18    OK to refill?

## 2018-05-24 NOTE — TELEPHONE ENCOUNTER
----- Message from Simon Dasilva sent at 5/24/2018 12:52 PM CDT -----  Contact: Pt  Can the clinic reply in [facility]: no      Please refill the medication(s) listed below. Please call the patient when the prescription(s) is ready for  at this phone number [phone] (home)       Medication #1 oxyCODONE-acetaminophen (PERCOCET) 5-325 mg per tablet      Preferred Pharmacy:   [facility] [address] [place] [address] & [address] [address]  Phone: [phone] Fax: [phone]

## 2018-05-24 NOTE — TELEPHONE ENCOUNTER
----- Message from Domi Wallace sent at 5/24/2018  1:01 PM CDT -----  Contact: Steff  Type:  RX Refill Request    Who Called:  patient  Refill or New Rx:  Refill   RX Name and Strength:  oxyCODONE-acetaminophen (PERCOCET) 5-325 mg per tablet  How is the patient currently taking it? (ex. 1XDay):  Take 1 tablet by mouth every 6 (six) hours as needed for Pain  Is this a 30 day or 90 day RX:  30  Preferred Pharmacy with phone number:    [facility] [address] [place] [address] & [address] [address]  Phone: [phone] Fax: [phone]  Local or Mail Order:  Local  Ordering Provider:  Dr Messi Marie Call Back Number:  [phone]  Additional Information:  na

## 2018-05-24 NOTE — PROGRESS NOTES
TIME RECORD    Date:  05/24/2018    Start Time:  1000  Stop Time:  1100    PROCEDURES:    TIMED  Procedure Time Min.   Thera Exercise Start:1018  Stop:1048 30    Start:  Stop:     Start:  Stop:     Start:  Stop:          UNTIMED  Procedure Time Min.   Moist Heat Start:1000  Stop:1018 18   Ice Start:1048  Stop:1100 12     Total Timed Minutes: 60  Total Timed Units:  2   Total Untimed Units:  2  Charges Billed/# of units:  2      Progress/Current Status    Subjective:     Patient ID: Steff Johnson is a 58 y.o. female.  Diagnosis:   1. S/P left rotator cuff repair         Pain: 2 /10         Objective:     Session began CHINTAN, then with moist heat pack placed over left shoulder for 18 mins. HEP reviewed while heat in place as per OTR's handouts/POC.  Patient re-instructed in all exercises through demonstration, handout.  Patient completed all PROM movements to left shoulder via use of a cane while standing (IR/EXT Rot , Flex/ext and ABD done at 5 reps each with 2-3 sec holds.  Ice pack placed over left shoulder for 12 mins at end of session.    Assessment:     Patient admits non compliance with HEP. Instructed that she must be compliant to show progress.    Patient Education/Response:     Ongoing process    Plans and Goals:     Cont POC

## 2018-05-29 ENCOUNTER — CLINICAL SUPPORT (OUTPATIENT)
Dept: REHABILITATION | Facility: HOSPITAL | Age: 58
End: 2018-05-29
Attending: ORTHOPAEDIC SURGERY
Payer: MEDICARE

## 2018-05-29 DIAGNOSIS — Z98.890 S/P LEFT ROTATOR CUFF REPAIR: ICD-10-CM

## 2018-05-29 DIAGNOSIS — M75.122 COMPLETE TEAR OF LEFT ROTATOR CUFF: Primary | ICD-10-CM

## 2018-05-29 PROCEDURE — 97140 MANUAL THERAPY 1/> REGIONS: CPT | Mod: PN

## 2018-05-29 PROCEDURE — 97110 THERAPEUTIC EXERCISES: CPT | Mod: PN

## 2018-05-29 NOTE — PROGRESS NOTES
TIME RECORD    Date:  05/29/2018    Start Time:  0810  Stop Time:  0850    PROCEDURES:    TIMED  Procedure Time Min.   Manual therapy Start:0810  Stop:0833 23   exercise Start:0833  Stop:0850 17    Start:  Stop:     Start:  Stop:          UNTIMED  Procedure Time Min.    Start:  Stop:     Start:  Stop:      Total Timed Minutes:  40  Total Timed Units:  3  Total Untimed Units:  0  Charges Billed/# of units:  3      Progress/Current Status    Subjective:     Patient ID: Steff Johnson is a 58 y.o. female.  Diagnosis:   1. Complete tear of left rotator cuff     2. S/P left rotator cuff repair       Pain: 8 /10  After treatment    Objective:     Session began with patient in supine for PROM all planes and shoulder mobilizations. Pt demonstrated entire HEP with minor corrections of technique. Passive pulleys 1 set 10 for shoulder flexion.    Assessment:     Doing better with ROM but still reporting 8/10 pain. Patient has improved on HEP compliance and correctence of technique.    Patient Education/Response:     ongoing    Plans and Goals:     Cont per poc

## 2018-05-29 NOTE — PROGRESS NOTES
TIME RECORD    Date:  05/22/2018    Start Time:  1100  Stop Time:  1145    PROCEDURES:    TIMED  Procedure Time Min.   Manual therapy Start:1100  Stop:1130 30   exercise Start:1130  Stop:1145 15    Start:  Stop:     Start:  Stop:          UNTIMED  Procedure Time Min.    Start:  Stop:     Start:  Stop:      Total Timed Minutes:  45  Total Timed Units:  3  Total Untimed Units:  0  Charges Billed/# of units:  3      Progress/Current Status    Subjective:     Patient ID: Steff Johnson is a 58 y.o. female.  Diagnosis:   1. S/P left rotator cuff repair       Pain: 8 /10  Pt reporting much more pain this day, swelling noted above clavicle.      Objective:     Session began with shoulder mobilizations all planes. Therapist then demonstrated modified HEP 2/2 increased pain. Patient instructed to coninue to use ice 3x daily.    Assessment:     Sudden report of increased pain this date. Pt non compliant with HEP and then seems to be doing 1-2 days of exercises too much during those days. Re educated and revised HEP.    Patient Education/Response:     ongoing    Plans and Goals:     Cont per poc

## 2018-05-31 ENCOUNTER — CLINICAL SUPPORT (OUTPATIENT)
Dept: REHABILITATION | Facility: HOSPITAL | Age: 58
End: 2018-05-31
Attending: ORTHOPAEDIC SURGERY
Payer: MEDICARE

## 2018-05-31 DIAGNOSIS — Z98.890 S/P LEFT ROTATOR CUFF REPAIR: ICD-10-CM

## 2018-05-31 DIAGNOSIS — M75.122 COMPLETE TEAR OF LEFT ROTATOR CUFF: Primary | ICD-10-CM

## 2018-05-31 PROCEDURE — 97140 MANUAL THERAPY 1/> REGIONS: CPT | Mod: PN

## 2018-05-31 NOTE — PROGRESS NOTES
TIME RECORD    Date:  05/31/2018    Start Time:  0810  Stop Time:  0855    PROCEDURES:    TIMED  Procedure Time Min.   Manual therapy Start:0810  Stop:0855` 45    Start:  Stop:     Start:  Stop:     Start:  Stop:          UNTIMED  Procedure Time Min.    Start:  Stop:     Start:  Stop:      Total Timed Minutes:  45  Total Timed Units:  3  Total Untimed Units:  0  Charges Billed/# of units:  3      Progress/Current Status    Subjective:     Patient ID: Steff NIALL Johnson is a 58 y.o. female.  Diagnosis:   1. Complete tear of left rotator cuff     2. S/P left rotator cuff repair       Pain: 3 /10      Objective:     Patient arrived to session with 3/10 pain. Supine for PROM, gentle stretch prior to shoulder mobilizations all planes.    Assessment:     3/10 pain but compliant with HEP    Patient Education/Response:     Ongoing     Plans and Goals:     poc

## 2018-06-04 RX ORDER — OXYCODONE AND ACETAMINOPHEN 5; 325 MG/1; MG/1
1 TABLET ORAL EVERY 6 HOURS PRN
Qty: 40 TABLET | Refills: 0 | Status: SHIPPED | OUTPATIENT
Start: 2018-06-04 | End: 2018-06-13 | Stop reason: SDUPTHER

## 2018-06-04 NOTE — TELEPHONE ENCOUNTER
----- Message from Simon Dasilva sent at 6/4/2018 11:30 AM CDT -----  Contact: Pt  Can the clinic reply in [facility]: no      Please refill the medication(s) listed below. Please call the patient when the prescription(s) is ready for  at this phone number  [phone] (home)         Medication #1 oxyCODONE-acetaminophen (PERCOCET) 5-325 mg per tablet      Preferred Pharmacy:   [facility] [address] [place] [address] & [address] [address]  Phone: [phone] Fax: [phone]

## 2018-06-05 ENCOUNTER — CLINICAL SUPPORT (OUTPATIENT)
Dept: REHABILITATION | Facility: HOSPITAL | Age: 58
End: 2018-06-05
Attending: ORTHOPAEDIC SURGERY
Payer: MEDICARE

## 2018-06-05 DIAGNOSIS — Z98.890 S/P LEFT ROTATOR CUFF REPAIR: ICD-10-CM

## 2018-06-05 DIAGNOSIS — M75.122 COMPLETE TEAR OF LEFT ROTATOR CUFF: Primary | ICD-10-CM

## 2018-06-05 PROCEDURE — 97110 THERAPEUTIC EXERCISES: CPT | Mod: PN

## 2018-06-05 PROCEDURE — 97140 MANUAL THERAPY 1/> REGIONS: CPT | Mod: PN

## 2018-06-05 NOTE — PROGRESS NOTES
TIME RECORD    Date:  06/05/2018    Start Time:  0806  Stop Time:  0900    PROCEDURES:    TIMED  Procedure Time Min.   exercise Start:0830  Stop:0900 30   Manual therapy Start:0806  Stop:0830 24    Start:  Stop:     Start:  Stop:          UNTIMED  Procedure Time Min.    Start:  Stop:     Start:  Stop:      Total Timed Minutes:  54  Total Timed Units:  4  Total Untimed Units:  0  Charges Billed/# of units:  4      Progress/Current Status    Subjective:     Patient ID: Steff Johnson is a 58 y.o. female.  Diagnosis:   1. Complete tear of left rotator cuff     2. S/P left rotator cuff repair       Pain: 3 /10      Objective:     Session began with patient supine on mat for PROM all planes and shoulder mobilizations. Instructed patient in use of passive pulleys (to take home) for shoulder flexion and abduction. Patient also instructed to start wall climbing.    Assessment:     Shoulder tightness noted with flexion and external rotation. Revised HEP    Patient Education/Response:     ongoing    Plans and Goals:     Cont per poc

## 2018-06-06 ENCOUNTER — LAB VISIT (OUTPATIENT)
Dept: LAB | Facility: HOSPITAL | Age: 58
End: 2018-06-06
Attending: INTERNAL MEDICINE
Payer: MEDICARE

## 2018-06-06 DIAGNOSIS — R80.9 PROTEINURIA, UNSPECIFIED TYPE: ICD-10-CM

## 2018-06-06 LAB
ALBUMIN SERPL BCP-MCNC: 3.5 G/DL
ANION GAP SERPL CALC-SCNC: 7 MMOL/L
BUN SERPL-MCNC: 19 MG/DL
CALCIUM SERPL-MCNC: 9.4 MG/DL
CHLORIDE SERPL-SCNC: 109 MMOL/L
CO2 SERPL-SCNC: 25 MMOL/L
CREAT SERPL-MCNC: 1.1 MG/DL
EST. GFR  (AFRICAN AMERICAN): >60 ML/MIN/1.73 M^2
EST. GFR  (NON AFRICAN AMERICAN): 54.7 ML/MIN/1.73 M^2
GLUCOSE SERPL-MCNC: 96 MG/DL
PHOSPHATE SERPL-MCNC: 3.9 MG/DL
POTASSIUM SERPL-SCNC: 4.1 MMOL/L
SODIUM SERPL-SCNC: 141 MMOL/L

## 2018-06-06 PROCEDURE — 36415 COLL VENOUS BLD VENIPUNCTURE: CPT | Mod: PO

## 2018-06-06 PROCEDURE — 80069 RENAL FUNCTION PANEL: CPT

## 2018-06-07 ENCOUNTER — CLINICAL SUPPORT (OUTPATIENT)
Dept: REHABILITATION | Facility: HOSPITAL | Age: 58
End: 2018-06-07
Attending: ORTHOPAEDIC SURGERY
Payer: MEDICARE

## 2018-06-07 ENCOUNTER — OFFICE VISIT (OUTPATIENT)
Dept: ORTHOPEDICS | Facility: CLINIC | Age: 58
End: 2018-06-07
Payer: MEDICARE

## 2018-06-07 VITALS
SYSTOLIC BLOOD PRESSURE: 204 MMHG | DIASTOLIC BLOOD PRESSURE: 72 MMHG | HEART RATE: 76 BPM | BODY MASS INDEX: 27.82 KG/M2 | HEIGHT: 59 IN | WEIGHT: 138 LBS

## 2018-06-07 DIAGNOSIS — M75.122 COMPLETE TEAR OF LEFT ROTATOR CUFF: Primary | ICD-10-CM

## 2018-06-07 DIAGNOSIS — Z98.890 S/P LEFT ROTATOR CUFF REPAIR: ICD-10-CM

## 2018-06-07 PROCEDURE — 99024 POSTOP FOLLOW-UP VISIT: CPT | Mod: S$GLB,,, | Performed by: ORTHOPAEDIC SURGERY

## 2018-06-07 PROCEDURE — 97110 THERAPEUTIC EXERCISES: CPT | Mod: PN

## 2018-06-07 PROCEDURE — 99999 PR PBB SHADOW E&M-EST. PATIENT-LVL III: CPT | Mod: PBBFAC,,, | Performed by: ORTHOPAEDIC SURGERY

## 2018-06-07 PROCEDURE — 97140 MANUAL THERAPY 1/> REGIONS: CPT | Mod: PN

## 2018-06-07 NOTE — PROGRESS NOTES
Past Medical History:   Diagnosis Date    Arthritis     COPD (chronic obstructive pulmonary disease)     Diabetes mellitus     Hypertension     Wears glasses        Past Surgical History:   Procedure Laterality Date    COLONOSCOPY N/A 4/11/2017    Procedure: COLONOSCOPY;  Surgeon: Jared Antonio MD;  Location: [facility];  Service: Endoscopy;  Laterality: N/A;    HYSTERECTOMY      OOPHORECTOMY      SHOULDER SURGERY      R       Current Outpatient Prescriptions   Medication Sig    amlodipine-valsartan (EXFORGE)  mg per tablet Take 1 tablet by mouth once daily.    blood sugar diagnostic Strp 1 strip four times daily before meals and bedtime. True Metrix or equivalent covered by insurance. Diagnosis: E11.65    blood-glucose meter (TRUE METRIX GLUCOSE METER) Misc 1 each by Misc.(Non-Drug; Combo Route) route As instructed (Diagnosis E11.65).    clotrimazole-betamethasone 1-0.05% (LOTRISONE) cream Apply topically 2 (two) times daily.    gabapentin (NEURONTIN) 300 MG capsule TK 1 C PO HS    hydrALAZINE (APRESOLINE) 50 MG tablet Take 2 tablets (100 mg total) by mouth every 12 (twelve) hours. (Patient taking differently: Take 100 mg by mouth every 12 (twelve) hours. TAKES AM ONLY)    insulin degludec 100 unit/mL (3 mL) InPn Inject 60 Units into the skin once daily.    lancets 33 gauge Misc 1 lancet by Misc.(Non-Drug; Combo Route) route 4 (four) times daily before meals and nightly. For True Metrix system. Diagnosis: E11.65    lidocaine-prilocaine (EMLA) cream as needed.     metFORMIN (GLUCOPHAGE-XR) 500 MG 24 hr tablet Take 1 tablet (500 mg total) by mouth 2 (two) times daily with meals. 2 TABLETS TWICE DAILY WITH MEALS- Pt taking 1 tablet twice a day    metoprolol succinate (TOPROL-XL) 200 MG 24 hr tablet Take 1 tablet (200 mg total) by mouth once daily.    oxyCODONE-acetaminophen (PERCOCET) 5-325 mg per tablet Take 1 tablet by mouth every 6 (six) hours as needed for Pain.    pen needle,  "diabetic (PEN NEEDLE) 30 gauge x 5/16" Ndle Inject 2 x /day    promethazine (PHENERGAN) 25 MG tablet Take 1 tablet (25 mg total) by mouth every 8 (eight) hours as needed for Nausea.    spironolactone (ALDACTONE) 25 MG tablet Take 1 tablet (25 mg total) by mouth every morning.    tiotropium (SPIRIVA WITH HANDIHALER) 18 mcg inhalation capsule Inhale 1 capsule (18 mcg total) into the lungs once daily.    tiZANidine (ZANAFLEX) 4 MG tablet TAKE 1 TABLET(4 MG) BY MOUTH EVERY 6 HOURS AS NEEDED FOR MUSCLE SPASM     No current facility-administered medications for this visit.        Review of patient's allergies indicates:  No Known Allergies    Family History   Problem Relation Age of Onset    Diabetes Mother     Asthma Mother     Hypertension Mother     Diabetes Father     Diabetes Brother     Hypertension Brother     Anemia Daughter        Social History     Social History    Marital status: Single     Spouse name: N/A    Number of children: N/A    Years of education: N/A     Occupational History          disabled due to shoulder problems     Social History Main Topics    Smoking status: Never Smoker    Smokeless tobacco: Never Used    Alcohol use No    Drug use: No    Sexual activity: Not Currently     Other Topics Concern    Not on file     Social History Narrative    No narrative on file       Chief Complaint:   Chief Complaint   Patient presents with    Shoulder Pain     6 WEEK F/U STATUS POST LEFT RCR 3/16/18       Date of surgery: March 16, 2018    History of present illness: 58-year-old female underwent left rotator cuff repair for a massive retracted rotator cuff tear.  Pain is an 6 out of 10.  Patient had a suture bridge technique performed.  We used suture tape.  Out of the sling and making some slow progress with physical therapy.      Review of Systems:    Musculoskeletal:  See HPI        Physical Examination:    Vital Signs:    Vitals:    06/07/18 0908   BP: (!) 204/72   Pulse: 76 "       Body mass index is 27.87 kg/m².    This a well-developed, well nourished patient in no acute distress.  They are alert and oriented and cooperative to examination.  Pt. walks without an antalgic gait.      Examination left shoulder shows healed surgical portals.  No erythema or drainage.  No bruising.  Fairly stiff and irritable.  Forward flexion of about 110°.  External rotation about 70°.    X-rays: None     Assessment:: Status post suture bridge rotator cuff repair for massive tear    Plan: The patient is pretty stiff.  She needs to continue with the occupational therapy.  Follow-up in 6 weeks.      This note was created using Dragon voice recognition software that occasionally misinterpreted phrases or words.

## 2018-06-08 NOTE — PROGRESS NOTES
TIME RECORD    Date:  06/07/2018    Start Time:  0810  Stop Time:  0900    PROCEDURES:    TIMED  Procedure Time Min.   Manual therapy Start:0810  Stop:0840 30   exercise Start:0840  Stop:0900 20    Start:  Stop:     Start:  Stop:          UNTIMED  Procedure Time Min.    Start:  Stop:     Start:  Stop:      Total Timed Minutes:  50  Total Timed Units:  3  Total Untimed Units:  0  Charges Billed/# of units:  3      Progress/Current Status    Subjective:     Patient ID: Steff Johnson is a 58 y.o. female.  Diagnosis:   1. Complete tear of left rotator cuff     2. S/P left rotator cuff repair       Pain: 6 /10      Objective:     Session began with shoulder mobilizations, gentle prolonged stretch and PROM L UE. Followed with HEP demonstration with pulley added and issued.    Assessment:     Still painful, tight but making progress. Internal rotation most difficult.    Patient Education/Response:     ongoing    Plans and Goals:     Cont per poc

## 2018-06-12 ENCOUNTER — OFFICE VISIT (OUTPATIENT)
Dept: NEPHROLOGY | Facility: CLINIC | Age: 58
End: 2018-06-12
Payer: MEDICARE

## 2018-06-12 VITALS
DIASTOLIC BLOOD PRESSURE: 100 MMHG | HEART RATE: 72 BPM | HEIGHT: 59 IN | OXYGEN SATURATION: 98 % | BODY MASS INDEX: 28.63 KG/M2 | SYSTOLIC BLOOD PRESSURE: 186 MMHG | WEIGHT: 142 LBS

## 2018-06-12 DIAGNOSIS — I15.2 HYPERTENSION ASSOCIATED WITH DIABETES: ICD-10-CM

## 2018-06-12 DIAGNOSIS — R80.9 PROTEINURIA, UNSPECIFIED TYPE: Primary | ICD-10-CM

## 2018-06-12 DIAGNOSIS — E11.65 TYPE 2 DIABETES MELLITUS WITH HYPERGLYCEMIA, WITH LONG-TERM CURRENT USE OF INSULIN: ICD-10-CM

## 2018-06-12 DIAGNOSIS — Z79.4 TYPE 2 DIABETES MELLITUS WITH HYPERGLYCEMIA, WITH LONG-TERM CURRENT USE OF INSULIN: ICD-10-CM

## 2018-06-12 DIAGNOSIS — E11.59 HYPERTENSION ASSOCIATED WITH DIABETES: ICD-10-CM

## 2018-06-12 PROCEDURE — 3008F BODY MASS INDEX DOCD: CPT | Mod: CPTII,S$GLB,, | Performed by: INTERNAL MEDICINE

## 2018-06-12 PROCEDURE — 3077F SYST BP >= 140 MM HG: CPT | Mod: CPTII,S$GLB,, | Performed by: INTERNAL MEDICINE

## 2018-06-12 PROCEDURE — 3046F HEMOGLOBIN A1C LEVEL >9.0%: CPT | Mod: CPTII,S$GLB,, | Performed by: INTERNAL MEDICINE

## 2018-06-12 PROCEDURE — 99999 PR PBB SHADOW E&M-EST. PATIENT-LVL V: CPT | Mod: PBBFAC,,, | Performed by: INTERNAL MEDICINE

## 2018-06-12 PROCEDURE — 3080F DIAST BP >= 90 MM HG: CPT | Mod: CPTII,S$GLB,, | Performed by: INTERNAL MEDICINE

## 2018-06-12 PROCEDURE — 99213 OFFICE O/P EST LOW 20 MIN: CPT | Mod: S$GLB,,, | Performed by: INTERNAL MEDICINE

## 2018-06-12 RX ORDER — NEBULIZER AND COMPRESSOR
1 EACH MISCELLANEOUS 3 TIMES DAILY
Qty: 1 EACH | Refills: 0 | Status: SHIPPED | OUTPATIENT
Start: 2018-06-12 | End: 2019-01-02 | Stop reason: SDUPTHER

## 2018-06-12 RX ORDER — CLONIDINE 0.1 MG/24H
1 PATCH, EXTENDED RELEASE TRANSDERMAL
Qty: 4 PATCH | Refills: 6 | Status: SHIPPED | OUTPATIENT
Start: 2018-06-12 | End: 2018-12-17

## 2018-06-12 RX ORDER — HYDROCODONE BITARTRATE AND ACETAMINOPHEN 10; 325 MG/1; MG/1
1 TABLET ORAL EVERY 8 HOURS PRN
COMMUNITY
End: 2018-06-13 | Stop reason: ALTCHOICE

## 2018-06-12 NOTE — TELEPHONE ENCOUNTER
Patient requesting refill on Percocet 5-325. Takes one Q6hrs PRN pain. Qty:40.    Last refilled on 6/4/18. Ok to refill?

## 2018-06-12 NOTE — TELEPHONE ENCOUNTER
----- Message from Veronica Dunbar sent at 6/12/2018  3:01 PM CDT -----  Contact: PT  Type:  RX Refill Request    Who Called:  Steff Johnson  Refill or New Rx:  Refill   RX Name and Strength:  HYDROcodone-acetaminophen (NORCO)  mg per tablet  How is the patient currently taking it? (ex. 1XDay):  n/a  Is this a 30 day or 90 day RX:  n/a  Preferred Pharmacy with phone number:    [facility] [address] [place] [address] & [address] [address]  Phone: [phone] Fax: [phone]  Local or Mail Order:  local  Ordering Provider:  n/a  Best Call Back Number:  [phone]  Additional Information:

## 2018-06-13 RX ORDER — OXYCODONE AND ACETAMINOPHEN 5; 325 MG/1; MG/1
1 TABLET ORAL EVERY 6 HOURS PRN
Qty: 40 TABLET | Refills: 0 | Status: SHIPPED | OUTPATIENT
Start: 2018-06-13 | End: 2018-06-25 | Stop reason: ALTCHOICE

## 2018-06-14 ENCOUNTER — CLINICAL SUPPORT (OUTPATIENT)
Dept: REHABILITATION | Facility: HOSPITAL | Age: 58
End: 2018-06-14
Attending: ORTHOPAEDIC SURGERY
Payer: MEDICARE

## 2018-06-14 DIAGNOSIS — M75.122 COMPLETE TEAR OF LEFT ROTATOR CUFF: Primary | ICD-10-CM

## 2018-06-14 DIAGNOSIS — Z98.890 S/P LEFT ROTATOR CUFF REPAIR: ICD-10-CM

## 2018-06-14 PROCEDURE — G8987 SELF CARE CURRENT STATUS: HCPCS | Mod: CL,PN

## 2018-06-14 PROCEDURE — 97110 THERAPEUTIC EXERCISES: CPT | Mod: PN

## 2018-06-14 PROCEDURE — 97140 MANUAL THERAPY 1/> REGIONS: CPT | Mod: PN

## 2018-06-14 PROCEDURE — G8988 SELF CARE GOAL STATUS: HCPCS | Mod: CI,PN

## 2018-06-15 ENCOUNTER — DOCUMENTATION ONLY (OUTPATIENT)
Dept: FAMILY MEDICINE | Facility: CLINIC | Age: 58
End: 2018-06-15

## 2018-06-15 NOTE — PROGRESS NOTES
Pre-Visit Chart Review  For Appointment Scheduled on 6/18/18    Health Maintenance Due   Topic Date Due    TETANUS VACCINE  01/08/1976    Eye Exam  08/01/2017    Zoster Vaccine  01/08/2018    Foot Exam  02/14/2018

## 2018-06-18 ENCOUNTER — OFFICE VISIT (OUTPATIENT)
Dept: FAMILY MEDICINE | Facility: CLINIC | Age: 58
End: 2018-06-18
Payer: MEDICARE

## 2018-06-18 VITALS
TEMPERATURE: 98 F | DIASTOLIC BLOOD PRESSURE: 88 MMHG | BODY MASS INDEX: 28.49 KG/M2 | HEART RATE: 65 BPM | SYSTOLIC BLOOD PRESSURE: 151 MMHG | HEIGHT: 59 IN | WEIGHT: 141.31 LBS

## 2018-06-18 DIAGNOSIS — Z79.4 TYPE 2 DIABETES MELLITUS WITH HYPERGLYCEMIA, WITH LONG-TERM CURRENT USE OF INSULIN: ICD-10-CM

## 2018-06-18 DIAGNOSIS — I15.2 HYPERTENSION ASSOCIATED WITH DIABETES: Primary | ICD-10-CM

## 2018-06-18 DIAGNOSIS — E11.65 TYPE 2 DIABETES MELLITUS WITH HYPERGLYCEMIA, WITH LONG-TERM CURRENT USE OF INSULIN: ICD-10-CM

## 2018-06-18 DIAGNOSIS — E11.59 HYPERTENSION ASSOCIATED WITH DIABETES: Primary | ICD-10-CM

## 2018-06-18 PROCEDURE — 3077F SYST BP >= 140 MM HG: CPT | Mod: CPTII,S$GLB,, | Performed by: PHYSICIAN ASSISTANT

## 2018-06-18 PROCEDURE — 3046F HEMOGLOBIN A1C LEVEL >9.0%: CPT | Mod: CPTII,S$GLB,, | Performed by: PHYSICIAN ASSISTANT

## 2018-06-18 PROCEDURE — 99999 PR PBB SHADOW E&M-EST. PATIENT-LVL V: CPT | Mod: PBBFAC,,, | Performed by: PHYSICIAN ASSISTANT

## 2018-06-18 PROCEDURE — 3079F DIAST BP 80-89 MM HG: CPT | Mod: CPTII,S$GLB,, | Performed by: PHYSICIAN ASSISTANT

## 2018-06-18 PROCEDURE — 99214 OFFICE O/P EST MOD 30 MIN: CPT | Mod: S$GLB,,, | Performed by: PHYSICIAN ASSISTANT

## 2018-06-18 PROCEDURE — 3008F BODY MASS INDEX DOCD: CPT | Mod: CPTII,S$GLB,, | Performed by: PHYSICIAN ASSISTANT

## 2018-06-18 RX ORDER — INSULIN PUMP SYRINGE, 3 ML
1 EACH MISCELLANEOUS 4 TIMES DAILY
Qty: 100 EACH | Refills: 3 | Status: SHIPPED | OUTPATIENT
Start: 2018-06-18 | End: 2019-01-02

## 2018-06-18 RX ORDER — HYDROCODONE BITARTRATE AND ACETAMINOPHEN 10; 325 MG/1; MG/1
1 TABLET ORAL
COMMUNITY
End: 2018-06-25 | Stop reason: SDUPTHER

## 2018-06-18 RX ORDER — PEN NEEDLE, DIABETIC 31 GX5/16"
NEEDLE, DISPOSABLE MISCELLANEOUS
COMMUNITY
Start: 2018-06-13 | End: 2018-06-18 | Stop reason: SDUPTHER

## 2018-06-18 RX ORDER — NEBULIZER AND COMPRESSOR
1 EACH MISCELLANEOUS DAILY
Qty: 1 EACH | Refills: 0 | Status: SHIPPED | OUTPATIENT
Start: 2018-06-18 | End: 2018-12-17 | Stop reason: SDUPTHER

## 2018-06-18 RX ORDER — PEN NEEDLE, DIABETIC 31 GX5/16"
1 NEEDLE, DISPOSABLE MISCELLANEOUS 2 TIMES DAILY
Qty: 60 EACH | Refills: 3 | Status: SHIPPED | OUTPATIENT
Start: 2018-06-18 | End: 2019-08-07

## 2018-06-18 RX ORDER — PEN NEEDLE, DIABETIC 30 GX3/16"
NEEDLE, DISPOSABLE MISCELLANEOUS
Qty: 200 EACH | Refills: 3 | Status: CANCELLED | OUTPATIENT
Start: 2018-06-18

## 2018-06-18 RX ORDER — ATORVASTATIN CALCIUM 20 MG/1
20 TABLET, FILM COATED ORAL DAILY
Qty: 90 TABLET | Refills: 3 | Status: SHIPPED | OUTPATIENT
Start: 2018-06-18 | End: 2019-11-20 | Stop reason: SDUPTHER

## 2018-06-18 NOTE — PATIENT INSTRUCTIONS
Established High Blood Pressure    High blood pressure (hypertension) is a chronic disease. Often, healthcare providers dont know what causes it. But it can be caused by certain health conditions and medicines.  If you have high blood pressure, you may not have any symptoms. If you do have symptoms, they may include headache, dizziness, changes in your vision, chest pain, and shortness of breath. But even without symptoms, high blood pressure thats not treated raises your risk for heart attack and stroke. High blood pressure is a serious health risk and shouldnt be ignored.  A blood pressure reading is made up of two numbers: a higher number over a lower number. The top number is the systolic pressure. The bottom number is the diastolic pressure. A normal blood pressure is a systolic pressure of  less than 120 over a diastolic pressure of less than 80. You will see your blood pressure readings written together. For example, a person with a systolic pressure of 188 and a diastolic pressure of 78 will have 118/78 written in the medical record.  High blood pressure is when either the top number is 140 or higher, or the bottom number is 90 or higher. This must be the result when taking your blood pressure a number of times. The blood pressures between normal and high are called prehypertension.  Home care  If you have high blood pressure, you should do what is listed below to lower your blood pressure. If you are taking medicines for high blood pressure, these methods may reduce or end your need for medicines in the future.  · Begin a weight-loss program if you are overweight.  · Cut back on how much salt you get in your diet. Heres how to do this:  ¨ Dont eat foods that have a lot of salt. These include olives, pickles, smoked meats, and salted potato chips.  ¨ Dont add salt to your food at the table.  ¨ Use only small amounts of salt when cooking.  · Start an exercise program. Talk with your healthcare  provider about the type of exercise program that would be best for you. It doesn't have to be hard. Even brisk walking for 20 minutes 3 times a week is a good form of exercise.  · Dont take medicines that stimulate the heart. This includes many over-the-counter cold and sinus decongestant pills and sprays, as well as diet pills. Check the warnings about hypertension on the label. Before buying any over-the-counter medicines or supplements, always ask the pharmacist about the product's potential interaction with your high blood pressure and your high blood pressure medicines.  · Stimulants such as amphetamine or cocaine could be deadly for someone with high blood pressure. Never take these.  · Limit how much caffeine you get in your diet. Switch to caffeine-free products.  · Stop smoking. If you are a long-time smoker, this can be hard. Talk to your healthcare provider about medicines and nicotine replacement options to help you. Also, enroll in a stop-smoking program to make it more likely that you will quit for good.  · Learn how to handle stress. This is an important part of any program to lower blood pressure. Learn about relaxation methods like meditation, yoga, or biofeedback.  · If your provider prescribed medicines, take them exactly as directed. Missing doses may cause your blood pressure get out of control.  · If you miss a dose or doses, check with your healthcare provider or pharmacist about what to do.  · Consider buying an automatic blood pressure machine. Ask your provider for a recommendation. You can get one of these at most pharmacies.     The American Heart Association recommends the following guidelines for home blood pressure monitoring:  · Don't smoke or drink coffee for 30 minutes before taking your blood pressure.  · Go to the bathroom before the test.  · Relax for 5 minutes before taking the measurement.  · Sit with your back supported (don't sit on a couch or soft chair); keep your feet on  the floor uncrossed. Place your arm on a solid flat surface (like a table) with the upper part of the arm at heart level. Place the middle of the cuff directly above the eye of the elbow. Check the monitor's instruction manual for an illustration.  · Take multiple readings. When you measure, take 2 to 3 readings one minute apart and record all of the results.  · Take your blood pressure at the same time every day, or as your healthcare provider recommends.  · Record the date, time, and blood pressure reading.  · Take the record with you to your next medical appointment. If your blood pressure monitor has a built-in memory, simply take the monitor with you to your next appointment.  · Call your provider if you have several high readings. Don't be frightened by a single high blood pressure reading, but if you get several high readings, check in with your healthcare provider.  · Note: When blood pressure reaches a systolic (top number) of 180 or higher OR diastolic (bottom number) of 110 or higher, seek emergency medical treatment.  Follow-up care  You will need to see your healthcare provider regularly. This is to check your blood pressure and to make changes to your medicines. Make a follow-up appointment as directed. Bring the record of your home blood pressure readings to the appointment.  When to seek medical advice  Call your healthcare provider right away if any of these occur:  · Blood pressure reaches a systolic (upper number) of 180 or higher OR a diastolic (bottom number) of 110 or higher  · Chest pain or shortness of breath  · Severe headache  · Throbbing or rushing sound in the ears  · Nosebleed  · Sudden severe pain in your belly (abdomen)  · Extreme drowsiness, confusion, or fainting  · Dizziness or spinning sensation (vertigo)  · Weakness of an arm or leg or one side of the face  · You have problems speaking or seeing   Date Last Reviewed: 12/1/2016  © 3607-4659 [employer]. [address] [place] [address]  [facility], [place] [address]. All rights reserved. This information is not intended as a substitute for professional medical care. Always follow your healthcare professional's instructions.        Eating Out When You Have Diabetes  Eating right is an important part of keeping your blood sugar in your target range. You just need to make healthy choices.    Tips for restaurant meals  When you eat away from home try these tips:  · Try to schedule your dining-out meal at your normal meal time. Make a reservation if possible, so you don't have to wait to eat. If you can't make a reservation, try to arrive at the restaurant at a less-busy time to cut down your wait time. Eat a small fruit or starch snack at your regular mealtime if your restaurant meal is going to be later than usual.   · Call ahead to see if the restaurant can meet your dietary needs if you've never been there before. Or you can go online to see the menu ahead of time.  · Carry some crackers with you in case the restaurant needs you to wait until you can be served.  · Ask how foods are prepared before you order.  · Instead of fried, sautéed, or breaded foods, choose ones that are broiled, steamed, grilled, or baked.  · Ask for sauces, gravies, and dressings on the side.  · Only eat an amount that fits your meal plan. Remember: You can take home the leftovers.  · Save dessert for special occasions. Then choose a small dessert or share one with a friend or family member.  Make healthy choices  Fast food  · Garden salad with light dressing on the side  · Baked potato with vegetables or herbs  · Broiled, roasted, or grilled chicken sandwich  · Sliced turkey or lean roast beef sandwich  Mexican  · Chicken enchilada, without cheese or sour cream   · Small burrito with whole beans and chicken  · Whole beans (not refried) and rice  · Chicken or fish fajitas  Steakhouse  · Grilled or broiled lean cuts of beef  · Baked potato with vegetables or herbs  · Broiled or  baked chicken. Dont eat the skin.  · Steamed vegetables  Asian  · Steamed dumplings or potstickers  · Broiled, boiled, or steamed meats or fish  · Sushi or sashimi  · Steamed rice or boiled noodles. One serving is equal to 1/3 cup.  Date Last Reviewed: 6/1/2016  © 8499-5498 [employer]. [address] [place] [address]. All rights reserved. This information is not intended as a substitute for professional medical care. Always follow your healthcare professional's instructions.

## 2018-06-18 NOTE — PROGRESS NOTES
Subjective:       Patient ID: Steff Johnson is a 58 y.o. female.    Chief Complaint: follow up hypertension    Mrs. Johnson is a 58 year old female who presents to clinic for follow up on chronic conditions. She was recently seen by Nephrology and clonidine patch was added to her BP regimen. She did not take any medication yet this morning because she has not eaten breakfast. She was given RX for BP machine, but need this faxed to [facility]. She is also requesting new glucometer and test strips. She is not checking blood glucose routinely,but reports morning readings typically . She is taking tresiba 60 units nightly. Last HgbA1c was elevated 9.6%. She is due for foot exam today. She states eye exam is up to date. She recovering from rotator cuff repair and is in physical therapy twice weekly. She is taking percocet every other day as needed for pain.       Review of Systems   Constitutional: Negative for activity change, appetite change, chills, fatigue and fever.   Eyes: Negative for visual disturbance.   Respiratory: Negative for cough and shortness of breath.    Cardiovascular: Negative for chest pain, palpitations and leg swelling.   Gastrointestinal: Negative for abdominal pain, constipation, diarrhea, nausea and vomiting.   Musculoskeletal: Positive for arthralgias.   Neurological: Negative for dizziness, weakness, light-headedness and headaches.       Objective:      Vitals:    06/18/18 1052   BP: (!) 151/88   Pulse: 65   Temp: 98.3 °F (36.8 °C)     Physical Exam   Constitutional: She is oriented to person, place, and time. She appears well-developed and well-nourished.   HENT:   Head: Normocephalic and atraumatic.   Eyes: Conjunctivae and EOM are normal. Pupils are equal, round, and reactive to light.   Cardiovascular: Normal rate, regular rhythm, normal heart sounds and intact distal pulses.    Pulses:       Dorsalis pedis pulses are 2+ on the right side, and 2+ on the left side.         "Posterior tibial pulses are 2+ on the right side, and 2+ on the left side.   Pulmonary/Chest: Effort normal and breath sounds normal.   Musculoskeletal:        Right foot: There is normal range of motion and no deformity.        Left foot: There is normal range of motion and no deformity.   Feet:   Right Foot:   Protective Sensation: 5 sites tested. 5 sites sensed.   Skin Integrity: Negative for ulcer, blister or skin breakdown.   Left Foot:   Protective Sensation: 5 sites tested. 5 sites sensed.   Skin Integrity: Negative for ulcer, blister or skin breakdown.   Neurological: She is alert and oriented to person, place, and time.   Skin: Skin is warm and dry.   Psychiatric: She has a normal mood and affect. Her behavior is normal.   Vitals reviewed.      Assessment:       1. Hypertension associated with diabetes    2. Type 2 diabetes mellitus with hyperglycemia, with long-term current use of insulin        Plan:       Hypertension associated with diabetes  -     BLOOD PRESSURE CUFF Misc; 1 Units by Misc.(Non-Drug; Combo Route) route once daily.  Dispense: 1 each; Refill: 0  - Uncontrolled due to non-compliance. Resume home medications and return for nursing BP check in 3-4 weeks.  - Continue to follow with Nephrology    Type 2 diabetes mellitus with hyperglycemia, with long-term current use of insulin  -     Add atorvastatin (LIPITOR) 20 MG tablet; Take 1 tablet (20 mg total) by mouth once daily.  Dispense: 90 tablet; Refill: 3  -     Ambulatory referral to Endocrinology  - Continue tresiba 60 units nightly for now. Consider addition of meal-time insulin in the future.   -     COMFORT EZ PEN NEEDLES 31 gauge x 3/16" Ndle; 1 each by Misc.(Non-Drug; Combo Route) route 2 (two) times daily.  Dispense: 60 each; Refill: 3  -     blood-glucose meter kit; 1 each by Other route 4 (four) times daily. Use as instructed  Dispense: 100 each; Refill: 3  -     blood sugar diagnostic (BLOOD GLUCOSE TEST) Strp; 1 strip by " Misc.(Non-Drug; Combo Route) route 4 (four) times daily.  Dispense: 100 strip; Refill: 3  - Request Eye exam from [facility]    Patient readiness: acceptance and barriers:none    During the course of the visit the patient was educated and counseled about the following:     Diabetes:  Discussed foot care.  Hypertension:   Check blood pressures daily and record.    Goals: Diabetes: Maintain Hemoglobin A1C below 7 and Hypertension: Reduce Blood Pressure    Did patient meet goals/outcomes: No    The following self management tools provided: declined    Patient Instructions (the written plan) was given to the patient/family.     Time spent with patient: 30 minutes      Follow up in 8-12 weeks

## 2018-06-19 ENCOUNTER — CLINICAL SUPPORT (OUTPATIENT)
Dept: REHABILITATION | Facility: HOSPITAL | Age: 58
End: 2018-06-19
Attending: ORTHOPAEDIC SURGERY
Payer: MEDICARE

## 2018-06-19 DIAGNOSIS — M75.122 COMPLETE TEAR OF LEFT ROTATOR CUFF: ICD-10-CM

## 2018-06-19 PROCEDURE — 97110 THERAPEUTIC EXERCISES: CPT | Mod: PN

## 2018-06-19 PROCEDURE — 97010 HOT OR COLD PACKS THERAPY: CPT | Mod: PN

## 2018-06-19 PROCEDURE — 97140 MANUAL THERAPY 1/> REGIONS: CPT | Mod: PN

## 2018-06-19 NOTE — PROGRESS NOTES
TIME RECORD    Date:  06/19/2018    Start Time:  1400  Stop Time:  1500    PROCEDURES:    TIMED  Procedure Time Min.   Thera Exer Start:1430  Stop:1445 15   Manual ther Start:1415  Stop:1430 15    Start:  Stop:     Start:  Stop:          UNTIMED  Procedure Time Min.   Moist Heat Start:1400  Stop:1415 15   Ice Pack Start:1445  Stop:1500 15     Total Timed Minutes: 30  Total Timed Units:  2  Total Untimed Units:  2  Charges Billed/# of units:        Progress/Current Status    Subjective:     Patient ID: Steff Johnson is a 58 y.o. female.  Diagnosis:   1. Complete tear of left rotator cuff      2. S/P left rotator cuff repair         Pain: 6 /10      Objective:     Session began with moist heat pack to Left shoulder.  S/P heat, gentle prolonged stretch and PROM LUE completed with TIPTON directing/assisting. Followed with HEP demonstration with wall pulley worked.  Ice pack placed over LUE for last 15 mins of session.    Assessment:     Still painful, tight but continues making progress. Internal rotation most difficult at this time.    Patient Education/Response:     Ongoing, work in progress.    Plans and Goals:     Cont current plan of care.

## 2018-06-20 NOTE — PROGRESS NOTES
TIME RECORD    Date:  06/14/2018    Start Time:  0800  Stop Time:  0845    PROCEDURES:    TIMED  Procedure Time Min.   Manual therapy Start:0800  Stop:0830 30   exercise Start:0830  Stop:0845 15    Start:  Stop:     Start:  Stop:          UNTIMED  Procedure Time Min.    Start:  Stop:     Start:  Stop:      Total Timed Minutes:  45  Total Timed Units:  3  Total Untimed Units:  0  Charges Billed/# of units:  3      Progress/Current Status    Subjective:     Patient ID: Steff Johnson is a 58 y.o. female.  Diagnosis:   1. Complete tear of left rotator cuff     2. S/P left rotator cuff repair       Pain: 2 /10      Objective:     Shoulder mobilizations applied to improve er, ir and flexion. Pt demonstrated HEP correctly and isometric er/ir exercises added to HEP.    Assessment:     Doing well    Patient Education/Response:     ongoing    Plans and Goals:     cont

## 2018-06-24 NOTE — PROGRESS NOTES
Subjective:       Patient ID: Steff Johnson is a 58 y.o. Black or  female who presents for new evaluation of Chronic Kidney Disease    jkj    She is referred by her PCP for proteinuria.   She has a significant history of DM and HTN, last Hba1c was 8.5 which improved from 11 previously. She denies foamy urine, no hematuria and no frequent UTIs. She follows a low sodium diet but admits she does not ensure hydration (dislikes water) Occasional NSAID use but no herbal medications. She was a premature infant. She received PRBCs before 1972. No known kidney disease in her family    Interval history: She reports she is feeling well. No foamy urine. Last Hba1c was 9.6, down from 10.3. No LE edema and no SOB    Review of Systems   Constitutional: Negative for activity change, appetite change, fatigue and unexpected weight change.   HENT: Negative for facial swelling.    Respiratory: Negative for shortness of breath.    Cardiovascular: Negative for chest pain and leg swelling.   Gastrointestinal: Positive for constipation. Negative for diarrhea.   Genitourinary: Negative for difficulty urinating, dysuria, flank pain and hematuria.   Musculoskeletal: Positive for arthralgias and back pain.   Neurological: Negative for weakness and headaches.       Objective:      Physical Exam   Constitutional: She is oriented to person, place, and time. She appears well-nourished. No distress.   HENT:   Mouth/Throat: Oropharynx is clear and moist.   Neck: No JVD present.   Cardiovascular: Regular rhythm, S1 normal and S2 normal.  Exam reveals no friction rub.    Pulmonary/Chest: Breath sounds normal. She has no wheezes. She has no rales.   Abdominal: Soft.   Musculoskeletal: She exhibits no edema.   Neurological: She is alert and oriented to person, place, and time.   Skin: Skin is warm and dry.   Psychiatric: She has a normal mood and affect.   Vitals reviewed.      Assessment:       1. Proteinuria, unspecified type    2.  Hypertension associated with diabetes    3. Type 2 diabetes mellitus with hyperglycemia, with long-term current use of insulin        Plan:           DM nephropathy. Continue RAAS blockade (valsartan) for renal preservation    HTN--continue meds. BP log in 2 weeks      RTC 6 months with labs prior

## 2018-06-25 RX ORDER — HYDROCODONE BITARTRATE AND ACETAMINOPHEN 10; 325 MG/1; MG/1
1 TABLET ORAL EVERY 6 HOURS PRN
Qty: 40 TABLET | Refills: 0 | Status: SHIPPED | OUTPATIENT
Start: 2018-06-25 | End: 2018-06-26

## 2018-06-25 NOTE — TELEPHONE ENCOUNTER
----- Message from Domi Correa sent at 6/25/2018 10:36 AM CDT -----  Contact: self  Patient [phone] is requesting a refill on her Oxycodone 5/325mg/please advise patient when sent to pharmacy    [facility] [address] [place] [address] & [address] [address]  Phone: [phone] Fax: [phone]

## 2018-06-26 ENCOUNTER — CLINICAL SUPPORT (OUTPATIENT)
Dept: REHABILITATION | Facility: HOSPITAL | Age: 58
End: 2018-06-26
Attending: ORTHOPAEDIC SURGERY
Payer: MEDICARE

## 2018-06-26 DIAGNOSIS — Z98.890 S/P LEFT ROTATOR CUFF REPAIR: ICD-10-CM

## 2018-06-26 DIAGNOSIS — M75.122 COMPLETE TEAR OF LEFT ROTATOR CUFF: Primary | ICD-10-CM

## 2018-06-26 PROCEDURE — 97140 MANUAL THERAPY 1/> REGIONS: CPT | Mod: PN

## 2018-06-26 PROCEDURE — 97010 HOT OR COLD PACKS THERAPY: CPT | Mod: PN

## 2018-06-26 PROCEDURE — 97110 THERAPEUTIC EXERCISES: CPT | Mod: PN

## 2018-06-26 RX ORDER — OXYCODONE AND ACETAMINOPHEN 5; 325 MG/1; MG/1
1 TABLET ORAL EVERY 6 HOURS PRN
Qty: 40 TABLET | Refills: 0 | Status: SHIPPED | OUTPATIENT
Start: 2018-06-26 | End: 2018-07-19 | Stop reason: SDUPTHER

## 2018-06-26 NOTE — TELEPHONE ENCOUNTER
----- Message from Cecelia Santos sent at 6/26/2018  9:04 AM CDT -----  Contact: self  Patient would like to have oxycodone instead of hydrocodone she states the hydrocodone makes her sleepy please call

## 2018-06-26 NOTE — TELEPHONE ENCOUNTER
Pt requesting Rx for Percocet instead of norco 10-325mg. States Norco makes her sleepy.. Please advise.     Last refill 6/25/18 Norco 10-325mg #40 q6.

## 2018-06-27 NOTE — PROGRESS NOTES
TIME RECORD    Date:  06/26/2018    Start Time:  0805  Stop Time:  0900    PROCEDURES:    TIMED  Procedure Time Min.   Heat Start:0805  Stop:0815 10   Manual therapy Start:0815  Stop:0845 30   exercise Start:0845  Stop:0900 15    Start:  Stop:          UNTIMED  Procedure Time Min.    Start:  Stop:     Start:  Stop:      Total Timed Minutes:  55  Total Timed Units:  3  Total Untimed Units:  0  Charges Billed/# of units:  3      Progress/Current Status    Subjective:     Patient ID: Steff Johnson is a 58 y.o. female.  Diagnosis:   1. Complete tear of left rotator cuff     2. S/P left rotator cuff repair       Pain: 6 /10  Patient reports taking pain medicine all day and also at night.    Objective:     After heat applied: PROM er,ir, flexion. Extension completed. Stretches reviewed and light mobilization applied. Pt instructed to begin isometric ir and er at home. Passive pulleys with WNL flexion L UE. Re-instructed patient in wall climbing to improve flexion. Pt demonstrated existing HEP but required vc to do correctly.    Assessment:     Pt presenting with excessive tightness and pain in spite of her reporting 100% compliance with HEP. Patient referred to [facility] for next apppintment on Thursday 6-28-18.    Patient Education/Response:     ongoing    Plans and Goals:     Referred to [facility] for next session and will determine poc after that session.

## 2018-06-28 ENCOUNTER — CLINICAL SUPPORT (OUTPATIENT)
Dept: REHABILITATION | Facility: HOSPITAL | Age: 58
End: 2018-06-28
Attending: ORTHOPAEDIC SURGERY
Payer: MEDICARE

## 2018-06-28 DIAGNOSIS — M25.612 SHOULDER STIFFNESS, LEFT: ICD-10-CM

## 2018-06-28 DIAGNOSIS — R29.898 SHOULDER WEAKNESS: ICD-10-CM

## 2018-06-28 DIAGNOSIS — M25.512 LEFT SHOULDER PAIN, UNSPECIFIED CHRONICITY: Primary | ICD-10-CM

## 2018-06-28 PROCEDURE — G8987 SELF CARE CURRENT STATUS: HCPCS | Mod: CH,PN

## 2018-06-28 PROCEDURE — G8988 SELF CARE GOAL STATUS: HCPCS | Mod: CH,PN

## 2018-06-28 PROCEDURE — 97110 THERAPEUTIC EXERCISES: CPT | Mod: PN

## 2018-06-28 NOTE — PROGRESS NOTES
"                                                                                                                      Time in 4  Time out 5      Timed units  4 therex                              Time:4-5      S: understands likely tx progression and rationale of current rehab  Pain:continues to decrease in intensity and frequency    O:  r prom er at 0 abd 50    at 45 abd 74    at 90 abd 80;       sidelying ir 80         ir behind back 14" lift off  l prom er at 0 abd 20   at 45 abd 50   at  90 abd nt since abd to 80 only;      sidelying ir 50         ir behind back belt          all capsular    HEP: issued current HEP- pain meds if pain prevents sleep, ice x 15' for pain when awake, light painless nonrepetitive use only; er 0, 45 abd stretch    manual tx: n/a    prom as tolerated-pain free: n/a    stretches as tolerated-pain free: er 0, 45 abd; sleeper    theraband 2 x 20:  n/a    isometrics 2 x 20: n/a    education: likely tx progression    ube: n/a    arom: n/a        A:gh capsular pattern, manual tx and stretching should promote full flexibility of shoulder complex            P:fix gh hypomobility then screen pec major clavicular head length and remaining elevation chain    FIM  Current, goal g code CH independent 0% impairment    Continue 2x week 12 weeks to meet goals in initial plan of care.  "

## 2018-07-05 ENCOUNTER — CLINICAL SUPPORT (OUTPATIENT)
Dept: REHABILITATION | Facility: HOSPITAL | Age: 58
End: 2018-07-05
Payer: MEDICARE

## 2018-07-05 DIAGNOSIS — R29.898 SHOULDER WEAKNESS: ICD-10-CM

## 2018-07-05 DIAGNOSIS — Z98.890 S/P LEFT ROTATOR CUFF REPAIR: Primary | ICD-10-CM

## 2018-07-05 DIAGNOSIS — M25.612 SHOULDER STIFFNESS, LEFT: ICD-10-CM

## 2018-07-05 PROCEDURE — 97110 THERAPEUTIC EXERCISES: CPT | Mod: PN

## 2018-07-05 PROCEDURE — 97140 MANUAL THERAPY 1/> REGIONS: CPT | Mod: PN

## 2018-07-10 ENCOUNTER — CLINICAL SUPPORT (OUTPATIENT)
Dept: REHABILITATION | Facility: HOSPITAL | Age: 58
End: 2018-07-10
Payer: MEDICARE

## 2018-07-10 DIAGNOSIS — M25.512 LEFT SHOULDER PAIN, UNSPECIFIED CHRONICITY: Primary | ICD-10-CM

## 2018-07-10 DIAGNOSIS — M25.612 SHOULDER STIFFNESS, LEFT: ICD-10-CM

## 2018-07-10 DIAGNOSIS — R29.898 SHOULDER WEAKNESS: ICD-10-CM

## 2018-07-10 DIAGNOSIS — Z98.890 S/P LEFT ROTATOR CUFF REPAIR: ICD-10-CM

## 2018-07-10 PROCEDURE — 97110 THERAPEUTIC EXERCISES: CPT | Mod: PN

## 2018-07-10 PROCEDURE — 97140 MANUAL THERAPY 1/> REGIONS: CPT | Mod: PN

## 2018-07-14 NOTE — PROGRESS NOTES
TIME RECORD    Date:  07/10/2018    Start Time:  0805  Stop Time:  0900    PROCEDURES:    TIMED  Procedure Time Min.   Manual therapy Start:0805  Stop:0845 40   exercise Start:0845  Stop:0900 15    Start:  Stop:     Start:  Stop:          UNTIMED  Procedure Time Min.    Start:  Stop:     Start:  Stop:      Total Timed Minutes:  55  Total Timed Units:  4  Total Untimed Units:  0  Charges Billed/# of units:  5      Progress/Current Status    Subjective:     Patient ID: Steff Johnson is a 58 y.o. female.  Diagnosis:   1. Left shoulder pain, unspecified chronicity     2. Shoulder stiffness, left     3. Shoulder weakness     4. S/P left rotator cuff repair       Pain: 0 /10      Objective:     Manual therapy to L shoulder as well as prolonged stretxches all planes. Pt then completed home program with some corrections needed for form as well as length of time for stretch.    Assessment:     improving    Patient Education/Response:     ongoing    Plans and Goals:     cont

## 2018-07-18 NOTE — PROGRESS NOTES
CC: This 58 y.o. female presents for management of diabetes  and chronic conditions pending review including HTN, HLP    HPI: She was diagnosed with T2DM in 2014. Has never been hospitalized r/t DM.  Family hx of DM: mom, dad, brothers, and sister     Denies missing doses of DM medication.   hypoglycemia at home- yes fasting  monitoring BG at home:  Fasting 60-80s  Bedtime 169-225    Diet: Eats 3 meals a day, snacks- PB and jelly- sandwich w milk   Exercise: walking, watches her grandson (4yrs old)   Dm education x1 at diagnosis     CURRENT DM MEDS: tresiba 60 u qhs ( only taking metformin when constipated)   Vial/pen:  Uses pens  Glucometer type:  True track?     Standards of Care:  Eye exam: 2/2018 no DM retinopathy, + cataracts     Follows w Dr Ruthann barclay nephrology     Retired from [employer] [place] dept (worked for them for 20 yrs)     ROS:   Gen: Appetite good, + weight gain 14 lbs, denies fatigue and weakness.  Skin: Skin is intact and heals well, no rashes, no hair changes  Eyes: Denies visual disturbances  Resp: no SOB or SEGUNDO, no cough  Cardiac: No palpitations, chest pain, no edema   GI: No nausea or vomiting, diarrhea, constipation, or abdominal pain.  /GYN: No nocturia, burning or pain.   MS/Neuro: Denies numbness/ tingling in BLE; Gait steady, speech clear  Psych: Denies drug/ETOH abuse, no hx of depression.  Other systems: negative.    PE:  GENERAL: Well developed, well nourished.  PSYCH: AAOx3, appropriate mood and affect, pleasant expression, conversant, appears relaxed, well groomed.   EYES: Conjunctiva, corneas clear  NECK: Supple, trachea midline  NEURO: Gait steady  SKIN: Skin warm and dry no acanthosis nigracans.  FOOT EXAMINATION: 6/2018  No foot deformity, corns or callus formation,  nails in good condition and well trimmed, no interspace maceration or ulceration noted.  Decreased hair growth present over toes/feet.  Positive vibratory response to 128 Hz tuning fork  bilaterally.  Protective sensation intact with 10 gram monofilament.  +2 dorsalis pedis and posterior pulses noted.      Lab Results   Component Value Date    MICALBCREAT 12.2 08/15/2017       Hemoglobin A1C   Date Value Ref Range Status   04/24/2018 9.6 (H) 4.0 - 5.6 % Final     Comment:     According to ADA guidelines, hemoglobin A1c <7.0% represents  optimal control in non-pregnant diabetic patients. Different  metrics may apply to specific patient populations.   Standards of Medical Care in Diabetes-2016.  For the purpose of screening for the presence of diabetes:  <5.7%     Consistent with the absence of diabetes  5.7-6.4%  Consistent with increasing risk for diabetes   (prediabetes)  >or=6.5%  Consistent with diabetes  Currently, no consensus exists for use of hemoglobin A1c  for diagnosis of diabetes for children.  This Hemoglobin A1c assay has significant interference with fetal   hemoglobin   (HbF). The results are invalid for patients with abnormal amounts of   HbF,   including those with known Hereditary Persistence   of Fetal Hemoglobin. Heterozygous hemoglobin variants (HbAS, HbAC,   HbAD, HbAE, HbA2) do not significantly interfere with this assay;   however, presence of multiple variants in a sample may impact the %   interference.     01/31/2018 10.3 (H) 4.0 - 5.6 % Final     Comment:     According to ADA guidelines, hemoglobin A1c <7.0% represents  optimal control in non-pregnant diabetic patients. Different  metrics may apply to specific patient populations.   Standards of Medical Care in Diabetes-2016.  For the purpose of screening for the presence of diabetes:  <5.7%     Consistent with the absence of diabetes  5.7-6.4%  Consistent with increasing risk for diabetes   (prediabetes)  >or=6.5%  Consistent with diabetes  Currently, no consensus exists for use of hemoglobin A1c  for diagnosis of diabetes for children.  This Hemoglobin A1c assay has significant interference with fetal   hemoglobin    (HbF). The results are invalid for patients with abnormal amounts of   HbF,   including those with known Hereditary Persistence   of Fetal Hemoglobin. Heterozygous hemoglobin variants (HbAS, HbAC,   HbAD, HbAE, HbA2) do not significantly interfere with this assay;   however, presence of multiple variants in a sample may impact the %   interference.     11/28/2017 10.6 (H) 4.0 - 5.6 % Final     Comment:     According to ADA guidelines, hemoglobin A1c <7.0% represents  optimal control in non-pregnant diabetic patients. Different  metrics may apply to specific patient populations.   Standards of Medical Care in Diabetes-2016.  For the purpose of screening for the presence of diabetes:  <5.7%     Consistent with the absence of diabetes  5.7-6.4%  Consistent with increasing risk for diabetes   (prediabetes)  >or=6.5%  Consistent with diabetes  Currently, no consensus exists for use of hemoglobin A1c  for diagnosis of diabetes for children.  This Hemoglobin A1c assay has significant interference with fetal   hemoglobin   (HbF). The results are invalid for patients with abnormal amounts of   HbF,   including those with known Hereditary Persistence   of Fetal Hemoglobin. Heterozygous hemoglobin variants (HbAS, HbAC,   HbAD, HbAE, HbA2) do not significantly interfere with this assay;   however, presence of multiple variants in a sample may impact the %   interference.          ASSESSMENT and PLAN:    1. T2DM with hyperglycemia, DM nephropathy  Decrease tresiba to 30 u qhs, Start glimeperide 2 mg qam,   CGMS  Check bg bid  F/u in 2 weeks  Discussed A1c and BG goals.   Reviewed  hypoglycemia, s/s and appropriate tx.   Instructed to monitor BG and bring meter/ log to every clinic visit.   - takes  Arb, statin    2. HTN - uncontrolled, continue meds as previously prescribed and monitor. See KLAIRSSA Merritt after this visit     3. HLP - on statin therapy, LFTs WNL    4. COPD- No current steroids    5.  Dm nephropathy follows w  Dr Echols     Follow-up: in 3 months with lab prior

## 2018-07-19 ENCOUNTER — CLINICAL SUPPORT (OUTPATIENT)
Dept: REHABILITATION | Facility: HOSPITAL | Age: 58
End: 2018-07-19
Payer: MEDICARE

## 2018-07-19 ENCOUNTER — OFFICE VISIT (OUTPATIENT)
Dept: ENDOCRINOLOGY | Facility: CLINIC | Age: 58
End: 2018-07-19
Payer: MEDICARE

## 2018-07-19 ENCOUNTER — OFFICE VISIT (OUTPATIENT)
Dept: ORTHOPEDICS | Facility: CLINIC | Age: 58
End: 2018-07-19
Payer: MEDICARE

## 2018-07-19 VITALS
DIASTOLIC BLOOD PRESSURE: 91 MMHG | HEIGHT: 59 IN | WEIGHT: 144.19 LBS | HEART RATE: 64 BPM | RESPIRATION RATE: 18 BRPM | TEMPERATURE: 98 F | SYSTOLIC BLOOD PRESSURE: 200 MMHG | BODY MASS INDEX: 29.07 KG/M2

## 2018-07-19 VITALS
DIASTOLIC BLOOD PRESSURE: 70 MMHG | SYSTOLIC BLOOD PRESSURE: 201 MMHG | WEIGHT: 141 LBS | BODY MASS INDEX: 28.43 KG/M2 | HEIGHT: 59 IN | HEART RATE: 84 BPM

## 2018-07-19 DIAGNOSIS — M25.512 LEFT SHOULDER PAIN, UNSPECIFIED CHRONICITY: Primary | ICD-10-CM

## 2018-07-19 DIAGNOSIS — E11.59 HYPERTENSION ASSOCIATED WITH DIABETES: ICD-10-CM

## 2018-07-19 DIAGNOSIS — Z79.4 TYPE 2 DIABETES MELLITUS WITH DIABETIC NEPHROPATHY, WITH LONG-TERM CURRENT USE OF INSULIN: Primary | ICD-10-CM

## 2018-07-19 DIAGNOSIS — M25.612 SHOULDER STIFFNESS, LEFT: Primary | ICD-10-CM

## 2018-07-19 DIAGNOSIS — Z98.890 S/P LEFT ROTATOR CUFF REPAIR: ICD-10-CM

## 2018-07-19 DIAGNOSIS — I15.2 HYPERTENSION ASSOCIATED WITH DIABETES: ICD-10-CM

## 2018-07-19 DIAGNOSIS — R29.898 SHOULDER WEAKNESS: ICD-10-CM

## 2018-07-19 DIAGNOSIS — M25.612 SHOULDER STIFFNESS, LEFT: ICD-10-CM

## 2018-07-19 DIAGNOSIS — M75.122 COMPLETE TEAR OF LEFT ROTATOR CUFF: ICD-10-CM

## 2018-07-19 DIAGNOSIS — E11.65 TYPE 2 DIABETES MELLITUS WITH HYPERGLYCEMIA, WITH LONG-TERM CURRENT USE OF INSULIN: ICD-10-CM

## 2018-07-19 DIAGNOSIS — E11.21 TYPE 2 DIABETES MELLITUS WITH DIABETIC NEPHROPATHY, WITH LONG-TERM CURRENT USE OF INSULIN: Primary | ICD-10-CM

## 2018-07-19 DIAGNOSIS — E78.2 MIXED HYPERLIPIDEMIA: ICD-10-CM

## 2018-07-19 DIAGNOSIS — Z79.4 TYPE 2 DIABETES MELLITUS WITH HYPERGLYCEMIA, WITH LONG-TERM CURRENT USE OF INSULIN: ICD-10-CM

## 2018-07-19 PROCEDURE — 99999 PR PBB SHADOW E&M-EST. PATIENT-LVL V: CPT | Mod: PBBFAC,,, | Performed by: NURSE PRACTITIONER

## 2018-07-19 PROCEDURE — 99214 OFFICE O/P EST MOD 30 MIN: CPT | Mod: S$GLB,,, | Performed by: NURSE PRACTITIONER

## 2018-07-19 PROCEDURE — 3078F DIAST BP <80 MM HG: CPT | Mod: CPTII,S$GLB,, | Performed by: ORTHOPAEDIC SURGERY

## 2018-07-19 PROCEDURE — 97140 MANUAL THERAPY 1/> REGIONS: CPT | Mod: PN

## 2018-07-19 PROCEDURE — 3077F SYST BP >= 140 MM HG: CPT | Mod: CPTII,S$GLB,, | Performed by: ORTHOPAEDIC SURGERY

## 2018-07-19 PROCEDURE — 3008F BODY MASS INDEX DOCD: CPT | Mod: CPTII,S$GLB,, | Performed by: ORTHOPAEDIC SURGERY

## 2018-07-19 PROCEDURE — 99999 PR PBB SHADOW E&M-EST. PATIENT-LVL III: CPT | Mod: PBBFAC,,, | Performed by: ORTHOPAEDIC SURGERY

## 2018-07-19 PROCEDURE — 97110 THERAPEUTIC EXERCISES: CPT | Mod: PN

## 2018-07-19 PROCEDURE — 99213 OFFICE O/P EST LOW 20 MIN: CPT | Mod: S$GLB,,, | Performed by: ORTHOPAEDIC SURGERY

## 2018-07-19 RX ORDER — GLIMEPIRIDE 2 MG/1
2 TABLET ORAL
Qty: 90 TABLET | Refills: 3 | Status: SHIPPED | OUTPATIENT
Start: 2018-07-19 | End: 2019-01-02

## 2018-07-19 RX ORDER — INSULIN DEGLUDEC 100 U/ML
30 INJECTION, SOLUTION SUBCUTANEOUS DAILY
Qty: 6 SYRINGE | Refills: 5
Start: 2018-07-19 | End: 2019-01-02

## 2018-07-19 RX ORDER — OXYCODONE AND ACETAMINOPHEN 5; 325 MG/1; MG/1
1 TABLET ORAL EVERY 6 HOURS PRN
Qty: 40 TABLET | Refills: 0 | Status: SHIPPED | OUTPATIENT
Start: 2018-07-19 | End: 2018-07-30 | Stop reason: SDUPTHER

## 2018-07-19 NOTE — TELEPHONE ENCOUNTER
Spoke to patient advised medication refill was sent to the pharmacy and should be ready for pickup.

## 2018-07-19 NOTE — TELEPHONE ENCOUNTER
Pt was seen today, states she was suppose to get a new rx? Takes Percocet 5/325, 1 Q6hrs PRN. Qty 40. Last refilled on 6/26/18    Ok to refill?

## 2018-07-19 NOTE — TELEPHONE ENCOUNTER
----- Message from Jomar Vera sent at 7/19/2018  1:55 PM CDT -----  Type: Needs Medical Advice    Who Called:  Self   Symptoms (please be specific):  NA  How long has patient had these symptoms:  NA  Pharmacy name and phone #:  [facility]  Best Call Back Number: [phone]  Additional Information: Patient was seen today, was advised a new rx would be called into the pharmacy. The patient asking if the office erx the rx.

## 2018-07-19 NOTE — PROGRESS NOTES
"  Past Medical History:   Diagnosis Date    Arthritis     COPD (chronic obstructive pulmonary disease)     Diabetes mellitus     Hypertension     Wears glasses        Past Surgical History:   Procedure Laterality Date    COLONOSCOPY N/A 4/11/2017    Procedure: COLONOSCOPY;  Surgeon: Jared Antonio MD;  Location: [facility];  Service: Endoscopy;  Laterality: N/A;    HYSTERECTOMY      OOPHORECTOMY      SHOULDER SURGERY      R       Current Outpatient Prescriptions   Medication Sig    amlodipine-valsartan (EXFORGE)  mg per tablet Take 1 tablet by mouth once daily.    atorvastatin (LIPITOR) 20 MG tablet Take 1 tablet (20 mg total) by mouth once daily.    BLOOD PRESSURE CUFF Misc 1 kit by Misc.(Non-Drug; Combo Route) route 3 (three) times daily.    BLOOD PRESSURE CUFF Misc 1 Units by Misc.(Non-Drug; Combo Route) route once daily.    blood sugar diagnostic (BLOOD GLUCOSE TEST) Strp 1 strip by Misc.(Non-Drug; Combo Route) route 4 (four) times daily.    blood sugar diagnostic Strp 1 strip four times daily before meals and bedtime. True Metrix or equivalent covered by insurance. Diagnosis: E11.65    blood-glucose meter (TRUE METRIX GLUCOSE METER) Misc 1 each by Misc.(Non-Drug; Combo Route) route As instructed (Diagnosis E11.65).    blood-glucose meter kit 1 each by Other route 4 (four) times daily. Use as instructed    cloNIDine 0.1 mg/24 hr td ptwk (CATAPRES) 0.1 mg/24 hr Place 1 patch onto the skin every 7 days.    clotrimazole-betamethasone 1-0.05% (LOTRISONE) cream Apply topically 2 (two) times daily.    COMFORT EZ PEN NEEDLES 31 gauge x 3/16" Ndle 1 each by Misc.(Non-Drug; Combo Route) route 2 (two) times daily.    gabapentin (NEURONTIN) 300 MG capsule TK 1 C PO HS as needed    hydrALAZINE (APRESOLINE) 50 MG tablet Take 2 tablets (100 mg total) by mouth every 12 (twelve) hours. (Patient taking differently: Take 100 mg by mouth every 12 (twelve) hours. TAKES AM ONLY)    insulin degludec 100 " "unit/mL (3 mL) InPn Inject 60 Units into the skin once daily. (Patient taking differently: Inject 60 Units into the skin once daily. Tresiba Flex touch Pen)    lancets 33 gauge Misc 1 lancet by Misc.(Non-Drug; Combo Route) route 4 (four) times daily before meals and nightly. For True Metrix system. Diagnosis: E11.65    lidocaine-prilocaine (EMLA) cream as needed.     metFORMIN (GLUCOPHAGE-XR) 500 MG 24 hr tablet Take 1 tablet (500 mg total) by mouth 2 (two) times daily with meals. 2 TABLETS TWICE DAILY WITH MEALS- Pt taking 1 tablet twice a day (Patient taking differently: Take 500 mg by mouth 2 (two) times daily with meals. 2 TABLETS TWICE DAILY WITH MEALS- Pt taking 1 tablet twice a day  (GIVES PT DIARRHEA))    metoprolol succinate (TOPROL-XL) 200 MG 24 hr tablet Take 1 tablet (200 mg total) by mouth once daily.    oxyCODONE-acetaminophen (PERCOCET) 5-325 mg per tablet Take 1 tablet by mouth every 6 (six) hours as needed for Pain.    pen needle, diabetic (PEN NEEDLE) 30 gauge x 5/16" Ndle Inject 2 x /day    spironolactone (ALDACTONE) 25 MG tablet Take 1 tablet (25 mg total) by mouth every morning.    tiotropium (SPIRIVA WITH HANDIHALER) 18 mcg inhalation capsule Inhale 1 capsule (18 mcg total) into the lungs once daily.    tiZANidine (ZANAFLEX) 4 MG tablet TAKE 1 TABLET(4 MG) BY MOUTH EVERY 6 HOURS AS NEEDED FOR MUSCLE SPASM     No current facility-administered medications for this visit.        Review of patient's allergies indicates:  No Known Allergies    Family History   Problem Relation Age of Onset    Diabetes Mother     Asthma Mother     Hypertension Mother     Diabetes Father     Diabetes Brother     Hypertension Brother     Anemia Daughter        Social History     Social History    Marital status: Single     Spouse name: N/A    Number of children: N/A    Years of education: N/A     Occupational History          disabled due to shoulder problems     Social History Main Topics    " Smoking status: Never Smoker    Smokeless tobacco: Never Used    Alcohol use No    Drug use: No    Sexual activity: Not Currently     Other Topics Concern    Not on file     Social History Narrative    No narrative on file       Chief Complaint:   Chief Complaint   Patient presents with    Shoulder Pain     L shoulder 6wk f/u       Date of surgery: March 16, 2018    History of present illness: 58-year-old female underwent left rotator cuff repair for a massive retracted rotator cuff tear.  Pain is an 6 out of 10.  Patient had a suture bridge technique performed.  We used suture tape.  Out of the sling and making some slow progress with physical therapy.      Review of Systems:    Musculoskeletal:  See HPI        Physical Examination:    Vital Signs:    Vitals:    07/19/18 0903   BP: (!) 201/70   Pulse: 84       Body mass index is 28.48 kg/m².    This a well-developed, well nourished patient in no acute distress.  They are alert and oriented and cooperative to examination.  Pt. walks without an antalgic gait.      Examination left shoulder shows healed surgical portals.  No erythema or drainage.  No bruising.  Fairly stiff and less irritable  Forward flexion of about 120°.  External rotation about 70°.    X-rays: None     Assessment:: Status post suture bridge rotator cuff repair for massive tear    Plan: The patient is pretty stiff.  She needs to continue with the occupational therapy.  Follow-up in 6 weeks.      This note was created using Dragon voice recognition software that occasionally misinterpreted phrases or words.

## 2018-07-19 NOTE — LETTER
July 19, 2018      Rita Queen PA-C  [address]  [place] [address]           [place] [facility]  [address]  [place] [address]  Phone: [phone]          Patient: Steff Johnson   MR Number: [id number]   YOB: 1960   Date of Visit: 7/19/2018       Dear Rita Queen:    Thank you for referring Steff Johnson to me for evaluation. Attached you will find relevant portions of my assessment and plan of care.    If you have questions, please do not hesitate to call me. I look forward to following Steff Johnson along with you.    Sincerely,    Cassidy Shaw, BRY, NP    Enclosure  CC:  No Recipients    If you would like to receive this communication electronically, please contact [email or web address] or [phone] to request more information on [facility] access.    For providers and/or their staff who would like to refer a patient to [facility], please contact us through our one-stop-shop provider referral line, [facility], at [phone].    If you feel you have received this communication in error or would no longer like to receive these types of communications, please e-mail [email or web address]

## 2018-07-19 NOTE — PROGRESS NOTES
CGMS  Freestyle Connor pro placed on patients right upper arm Sub Q. Patient tolerated procedure well. Food log explained, patient verbalized understanding. Patient will return on  for removal on 8-2-18. SN [id number] Exp date 2019-01-31.

## 2018-07-19 NOTE — PROGRESS NOTES
TIME RECORD    Date:  07/05/2018    Start Time:  0815  Stop Time:  0900    PROCEDURES:    TIMED  Procedure Time Min.   Manual therapy Start:0815  Stop:0845 30   exercise Start:0845  Stop:0900 15    Start:  Stop:     Start:  Stop:          UNTIMED  Procedure Time Min.    Start:  Stop:     Start:  Stop:      Total Timed Minutes:  45  Total Timed Units:  3  Total Untimed Units:  0  Charges Billed/# of units:  3      Progress/Current Status    Subjective:     Patient ID: Steff Johnson is a 58 y.o. female.  Diagnosis:   1. S/P left rotator cuff repair     2. Shoulder stiffness, left     3. Shoulder weakness       Pain: 0 /10      Objective:     Mobilization L shoulder and PROM prolonged stretches applied. Pt then demonstrated HEP with some correction from therapist.    Assessment:     progressing    Patient Education/Response:     ongoing    Plans and Goals:     cont

## 2018-07-23 ENCOUNTER — CLINICAL SUPPORT (OUTPATIENT)
Dept: FAMILY MEDICINE | Facility: CLINIC | Age: 58
End: 2018-07-23
Payer: MEDICARE

## 2018-07-23 VITALS — HEART RATE: 66 BPM | DIASTOLIC BLOOD PRESSURE: 92 MMHG | SYSTOLIC BLOOD PRESSURE: 152 MMHG

## 2018-07-23 DIAGNOSIS — I10 HYPERTENSION, UNCONTROLLED: ICD-10-CM

## 2018-07-23 DIAGNOSIS — E11.59 HYPERTENSION ASSOCIATED WITH DIABETES: Primary | ICD-10-CM

## 2018-07-23 DIAGNOSIS — I15.2 HYPERTENSION ASSOCIATED WITH DIABETES: Primary | ICD-10-CM

## 2018-07-23 DIAGNOSIS — I10 HYPERTENSION, UNSPECIFIED TYPE: ICD-10-CM

## 2018-07-23 PROCEDURE — 99999 PR PBB SHADOW E&M-EST. PATIENT-LVL III: CPT | Mod: PBBFAC,,, | Performed by: PHYSICIAN ASSISTANT

## 2018-07-23 NOTE — PROGRESS NOTES
TIME RECORD    Date:  07/19/2018    Start Time:  0810  Stop Time:  0900    PROCEDURES:    TIMED  Procedure Time Min.   Manual therapy Start:0810  Stop:0830 20   exercise Start:0830  Stop:0900 30    Start:  Stop:     Start:  Stop:          UNTIMED  Procedure Time Min.    Start:  Stop:     Start:  Stop:      Total Timed Minutes:  50  Total Timed Units:  3  Total Untimed Units:  0  Charges Billed/# of units:  3      Progress/Current Status    Subjective:     Patient ID: Steff Johnson is a 58 y.o. female.  Diagnosis:   1. Left shoulder pain, unspecified chronicity     2. Shoulder stiffness, left     3. Shoulder weakness     4. S/P left rotator cuff repair       Pain: 0 /10      Objective:     Mobilizations and stretches while pt in supine and then pt demonstrated HEP, continues to need corrections    Assessment:     Pt slowly improving but each time she demonstrates HEP: she is doing at least 2 of the exercises wrong.    Patient Education/Response:     Ongoing, every session.    Plans and Goals:     cont

## 2018-07-23 NOTE — NURSING
2 patient identifiers used (name & ). Patient came in for nurse blood pressure check. Automatic reading was 152/92  P-66. Left arm resting on desk, feet flat on floor, back straight. Patient had 8/10 c/o pain to her L shoulder- S/P rotator cuff repair. Patient stated that they did not take their prescribed blood pressure medication this am. Allergies and medications reviewed with the patient.  Patient advised to continue taking medications as prescribed and follow up as scheduled. Patient educated on HS snack due to her BS being low in the am(66-72ish), complex carbs such as a peanutbutter sandwich or cheese, etc. Patient is going home to take medication and then check her BP about an hour after and call with that reading. Appears to be in good control R/T minimally high BP with no medication in 24 hrs.

## 2018-07-23 NOTE — PROGRESS NOTES
Patient came in for nurse blood pressure check. Automatic reading was 152/92  P-66. Left arm resting on desk, feet flat on floor, back straight. Patient had 8/10 c/o pain to her L shoulder- S/P rotator cuff repair. Patient stated that they did not take their prescribed blood pressure medication this am. Allergies and medications reviewed with the patient.  Patient advised to continue taking medications as prescribed and follow up as scheduled. Patient educated on HS snack due to her BS being low in the am(66-72ish), complex carbs such as a peanutbutter sandwich or cheese, etc. Patient is going home to take medication and then check her BP about an hour after and call with that reading. Appears to be in good control R/T minimally high BP with no medication in 24 hrs.

## 2018-07-24 ENCOUNTER — CLINICAL SUPPORT (OUTPATIENT)
Dept: REHABILITATION | Facility: HOSPITAL | Age: 58
End: 2018-07-24
Payer: MEDICARE

## 2018-07-24 DIAGNOSIS — Z98.890 S/P LEFT ROTATOR CUFF REPAIR: ICD-10-CM

## 2018-07-24 DIAGNOSIS — M25.512 LEFT SHOULDER PAIN, UNSPECIFIED CHRONICITY: Primary | ICD-10-CM

## 2018-07-24 DIAGNOSIS — M25.612 SHOULDER STIFFNESS, LEFT: ICD-10-CM

## 2018-07-24 DIAGNOSIS — R29.898 SHOULDER WEAKNESS: ICD-10-CM

## 2018-07-24 PROCEDURE — 97110 THERAPEUTIC EXERCISES: CPT | Mod: PN

## 2018-07-24 PROCEDURE — 97140 MANUAL THERAPY 1/> REGIONS: CPT | Mod: PN

## 2018-07-24 NOTE — PROGRESS NOTES
TIME RECORD    Date:  07/24/2018    Start Time:  0810  Stop Time:  0850    PROCEDURES:    TIMED  Procedure Time Min.   Manual therapy Start:0810  Stop:0820 10   exercise Start:0820  Stop:0850 30    Start:  Stop:     Start:  Stop:          UNTIMED  Procedure Time Min.    Start:  Stop:     Start:  Stop:      Total Timed Minutes:  40  Total Timed Units:  3  Total Untimed Units:  0  Charges Billed/# of units:  3      Progress/Current Status    Subjective:     Patient ID: Steff Johnson is a 58 y.o. female.  Diagnosis:   1. Left shoulder pain, unspecified chronicity     2. Shoulder stiffness, left     3. Shoulder weakness     4. S/P left rotator cuff repair       Pain: 0 /10  Tightness in left shoulder    Objective:     Manual stretches applied by therapist with mobilizations. Pt then instructed to demonstrate entire home program.    Assessment:     Again, patient not performing exercises correctly. Re-instructed in correct form.    Patient Education/Response:     Ongoing, pt having difficulty doing HEP correctly.    Plans and Goals:     cont

## 2018-07-26 ENCOUNTER — CLINICAL SUPPORT (OUTPATIENT)
Dept: REHABILITATION | Facility: HOSPITAL | Age: 58
End: 2018-07-26
Payer: MEDICARE

## 2018-07-26 DIAGNOSIS — Z98.890 S/P LEFT ROTATOR CUFF REPAIR: ICD-10-CM

## 2018-07-26 DIAGNOSIS — M25.512 LEFT SHOULDER PAIN, UNSPECIFIED CHRONICITY: Primary | ICD-10-CM

## 2018-07-26 DIAGNOSIS — R29.898 SHOULDER WEAKNESS: ICD-10-CM

## 2018-07-26 DIAGNOSIS — M25.612 SHOULDER STIFFNESS, LEFT: ICD-10-CM

## 2018-07-26 PROCEDURE — 97110 THERAPEUTIC EXERCISES: CPT | Mod: PN

## 2018-07-26 NOTE — PROGRESS NOTES
TIME RECORD    Date:  07/26/2018    Start Time:  0810  Stop Time:  0851    PROCEDURES:    TIMED  Procedure Time Min.   exercise Start:0810  Stop:0851 41    Start:  Stop:     Start:  Stop:     Start:  Stop:          UNTIMED  Procedure Time Min.    Start:  Stop:     Start:  Stop:      Total Timed Minutes:  41  Total Timed Units:  3  Total Untimed Units:  0  Charges Billed/# of units:  3      Progress/Current Status    Subjective:     Patient ID: Steff Johnson is a 58 y.o. female.  Diagnosis:   1. Left shoulder pain, unspecified chronicity     2. Shoulder stiffness, left     3. Shoulder weakness     4. S/P left rotator cuff repair       Pain: 0 /10      Objective:     Pt demonstrated HEP: 5 reps of each for 1 minute hold each.    Assessment:     Improved ROM.    Patient Education/Response:     Pt instructed to continue HEP and to bring sheets with illustrations of ntire HEP to appt next week.    Plans and Goals:     cont

## 2018-07-27 NOTE — TELEPHONE ENCOUNTER
----- Message from Missael Bhatt sent at 7/27/2018  3:17 PM CDT -----  Contact: roxane  Please refill rx oxyCODONE-acetaminophen (PERCOCET) 5-325 mg per tablet 40 tablet      [facility] [address] [place] [address] & [address] [address]  Phone: [phone] Fax: [phone] (home)   thanks

## 2018-07-28 ENCOUNTER — NURSE TRIAGE (OUTPATIENT)
Dept: ADMINISTRATIVE | Facility: CLINIC | Age: 58
End: 2018-07-28

## 2018-07-28 NOTE — TELEPHONE ENCOUNTER
"Pt diabetic monitor is loose, aggravating her and itching. Took 30 units now at 11am. Tape is coming loose.      Reason for Disposition   [1] Dressing needs to be changed (e.g., wet, soiled, loose, or open to air) AND [2] unable or unwilling to perform dressing change    Answer Assessment - Initial Assessment Questions  1. SYMPTOM:  "What's the main symptom you're concerned about?" (e.g., pain, redness, swelling, pus)    Monitor put in last week   2. ONSET: "When did the ________  start?"     Tape loose yest -  yest sweaty and got loose   3. IV WHAT: "What kind of IV line do you have?" (e.g., central line, PICC, peripheral IV)     DM monitor- itching and hurting   4. IV WHERE: "Where does the IV enter your body?"     R arm   5. IV WHEN: "When was this IV put in?"     last week   6. IV WHY: "Why do you have this IV line?"     Monitoring BS   7. IV RUNNING OK: "Is the IV running OK?" (e.g., running OK, running slowly, not running, unable to flush)      No fluid   8. PAIN: "Is there any pain?" If so, ask: "How bad is the pain?"   (e.g., scale 1-10; or mild, moderate, severe)    No   9. OTHER SYMPTOMS: "Do you have any other symptoms?" (e.g., fever, shaking chills, new weakness, pus)    No   10. VISITING NURSE: "Do you have a visiting nurse?" (e.g., home health nurse, IV infusion nurse)      No    Protocols used: ST IV SITE (SKIN) SYMPTOMS-A-  rec UC/ED today to re-secure or replace dressing. May ask for tegaderm or paper tape at pharmacy. Call back with questions.     "

## 2018-07-30 RX ORDER — OXYCODONE AND ACETAMINOPHEN 5; 325 MG/1; MG/1
1 TABLET ORAL EVERY 6 HOURS PRN
Qty: 40 TABLET | Refills: 0 | Status: SHIPPED | OUTPATIENT
Start: 2018-07-30 | End: 2018-08-09

## 2018-07-31 ENCOUNTER — TELEPHONE (OUTPATIENT)
Dept: ENDOCRINOLOGY | Facility: CLINIC | Age: 58
End: 2018-07-31

## 2018-07-31 ENCOUNTER — CLINICAL SUPPORT (OUTPATIENT)
Dept: REHABILITATION | Facility: HOSPITAL | Age: 58
End: 2018-07-31
Payer: MEDICARE

## 2018-07-31 DIAGNOSIS — M25.512 LEFT SHOULDER PAIN, UNSPECIFIED CHRONICITY: Primary | ICD-10-CM

## 2018-07-31 DIAGNOSIS — Z98.890 S/P LEFT ROTATOR CUFF REPAIR: ICD-10-CM

## 2018-07-31 DIAGNOSIS — M25.612 SHOULDER STIFFNESS, LEFT: ICD-10-CM

## 2018-07-31 DIAGNOSIS — R29.898 SHOULDER WEAKNESS: ICD-10-CM

## 2018-07-31 PROCEDURE — 97110 THERAPEUTIC EXERCISES: CPT | Mod: PN

## 2018-07-31 PROCEDURE — 97140 MANUAL THERAPY 1/> REGIONS: CPT | Mod: PN

## 2018-07-31 NOTE — TELEPHONE ENCOUNTER
----- Message from Fozia Drummond sent at 7/28/2018 10:17 AM CDT -----  Pt spoke to triage nurse about her diabetic device in her arm/ irritated

## 2018-08-01 NOTE — PROGRESS NOTES
TIME RECORD    Date:  08/01/2018    Start Time:  0810  Stop Time:  0900    PROCEDURES:    TIMED  Procedure Time Min.   Manual therapy Start:0810  Stop:0830 20   exercise Start:0830  Stop:0900 30    Start:  Stop:     Start:  Stop:          UNTIMED  Procedure Time Min.    Start:  Stop:     Start:  Stop:      Total Timed Minutes:  50  Total Timed Units:  3  Total Untimed Units:  0  Charges Billed/# of units:  3      Progress/Current Status    Subjective:     Patient ID: Steff Johnson is a 58 y.o. female.  Diagnosis:   1. Left shoulder pain, unspecified chronicity     2. Shoulder stiffness, left     3. Shoulder weakness     4. S/P left rotator cuff repair       Pain: 2 /10  At er/ir end range    Objective:     Prolonged gently stretch ir,er L shoulder. Then had pt demonstrate HEP from illustrations that she brought with her. Again, she was not doing them correctly. Re-educated pt on correct form.    Assessment:     Pt making slow progress very likely to being unable to complete HEP in correct form    Patient Education/Response:     ongoing    Plans and Goals:     cont

## 2018-08-01 NOTE — TELEPHONE ENCOUNTER
Called patient and she says that she was itching around the area that the CGMS device but not that bad and the tegaderm was coming off so she had someone replace it with a new one. She reports not rash or swelling at the site just some itching. Informed patient to call if any of her symptoms related to the CGMS change. Patient verbalized understanding.

## 2018-08-02 ENCOUNTER — CLINICAL SUPPORT (OUTPATIENT)
Dept: REHABILITATION | Facility: HOSPITAL | Age: 58
End: 2018-08-02
Payer: MEDICARE

## 2018-08-02 ENCOUNTER — OFFICE VISIT (OUTPATIENT)
Dept: ENDOCRINOLOGY | Facility: CLINIC | Age: 58
End: 2018-08-02
Payer: MEDICARE

## 2018-08-02 VITALS
WEIGHT: 141.56 LBS | TEMPERATURE: 98 F | HEIGHT: 59 IN | BODY MASS INDEX: 28.54 KG/M2 | SYSTOLIC BLOOD PRESSURE: 181 MMHG | DIASTOLIC BLOOD PRESSURE: 101 MMHG | HEART RATE: 73 BPM | RESPIRATION RATE: 16 BRPM

## 2018-08-02 DIAGNOSIS — R29.898 SHOULDER WEAKNESS: ICD-10-CM

## 2018-08-02 DIAGNOSIS — M75.122 COMPLETE TEAR OF LEFT ROTATOR CUFF: ICD-10-CM

## 2018-08-02 DIAGNOSIS — Z79.4 TYPE 2 DIABETES MELLITUS WITH HYPERGLYCEMIA, WITH LONG-TERM CURRENT USE OF INSULIN: Primary | ICD-10-CM

## 2018-08-02 DIAGNOSIS — E11.65 TYPE 2 DIABETES MELLITUS WITH HYPERGLYCEMIA, WITH LONG-TERM CURRENT USE OF INSULIN: Primary | ICD-10-CM

## 2018-08-02 DIAGNOSIS — G89.29 CHRONIC LEFT SHOULDER PAIN: Primary | ICD-10-CM

## 2018-08-02 DIAGNOSIS — M25.612 SHOULDER STIFFNESS, LEFT: ICD-10-CM

## 2018-08-02 DIAGNOSIS — M25.512 CHRONIC LEFT SHOULDER PAIN: Primary | ICD-10-CM

## 2018-08-02 PROCEDURE — 97110 THERAPEUTIC EXERCISES: CPT | Mod: PN

## 2018-08-02 PROCEDURE — 99999 PR PBB SHADOW E&M-EST. PATIENT-LVL V: CPT | Mod: PBBFAC,,, | Performed by: NURSE PRACTITIONER

## 2018-08-02 PROCEDURE — 99499 UNLISTED E&M SERVICE: CPT | Mod: S$GLB,,, | Performed by: NURSE PRACTITIONER

## 2018-08-02 NOTE — PROGRESS NOTES
TIME RECORD    Date:  08/02/2018    Start Time:  0815  Stop Time:  0900    PROCEDURES:    TIMED  Procedure Time Min.   exercise Start:0815  Stop:0900 45    Start:  Stop:     Start:  Stop:     Start:  Stop:          UNTIMED  Procedure Time Min.    Start:  Stop:     Start:  Stop:      Total Timed Minutes:  45  Total Timed Units:  3  Total Untimed Units:  0  Charges Billed/# of units:  3      Progress/Current Status    Subjective:     Patient ID: Steff Johnson is a 58 y.o. female.  Diagnosis:   1. Chronic left shoulder pain     2. Shoulder stiffness, left     3. Shoulder weakness     4. Complete tear of left rotator cuff       Pain: 0 /10      Objective:     Session began with patient demonstration of complete HEP (er stretches). Also used shoulder flexion incline board 10 reps, thrapy bar with shoulder extension and internal rotation.    Assessment:     Much improved this date. Pt demonstrated HEP correctly.    Patient Education/Response:     ongoing    Plans and Goals:     cont

## 2018-08-07 ENCOUNTER — CLINICAL SUPPORT (OUTPATIENT)
Dept: REHABILITATION | Facility: HOSPITAL | Age: 58
End: 2018-08-07
Payer: MEDICARE

## 2018-08-07 DIAGNOSIS — G89.29 CHRONIC LEFT SHOULDER PAIN: Primary | ICD-10-CM

## 2018-08-07 DIAGNOSIS — M25.512 CHRONIC LEFT SHOULDER PAIN: Primary | ICD-10-CM

## 2018-08-07 DIAGNOSIS — M25.512 LEFT SHOULDER PAIN, UNSPECIFIED CHRONICITY: Primary | ICD-10-CM

## 2018-08-07 DIAGNOSIS — M25.612 SHOULDER STIFFNESS, LEFT: ICD-10-CM

## 2018-08-07 DIAGNOSIS — Z98.890 S/P LEFT ROTATOR CUFF REPAIR: ICD-10-CM

## 2018-08-07 DIAGNOSIS — R29.898 SHOULDER WEAKNESS: ICD-10-CM

## 2018-08-07 DIAGNOSIS — M75.122 COMPLETE TEAR OF LEFT ROTATOR CUFF: ICD-10-CM

## 2018-08-07 PROCEDURE — 97110 THERAPEUTIC EXERCISES: CPT | Mod: PN

## 2018-08-07 NOTE — PROGRESS NOTES
TIME RECORD    Date:  08/07/2018    Start Time:  0807  Stop Time:  0848    PROCEDURES:    TIMED  Procedure Time Min.   exercise Start:0807  Stop:0848 41    Start:  Stop:     Start:  Stop:     Start:  Stop:          UNTIMED  Procedure Time Min.    Start:  Stop:     Start:  Stop:      Total Timed Minutes:  41  Total Timed Units:  3  Total Untimed Units:  0  Charges Billed/# of units:  3      Progress/Current Status    Subjective:     Patient ID: Steff Johnson is a 58 y.o. female.  Diagnosis:   1. Chronic left shoulder pain     2. Shoulder stiffness, left     3. Shoulder weakness     4. Complete tear of left rotator cuff     5. S/P left rotator cuff repair       Pain: 0 /10  Minimal pain with prolonged stretching.    Objective:     Session began with patient demonstration of HEP: pt unable to demonstrate the 2 exercises in her HEP. Re-educated patient on correct performance of HEP, as has been necessary almost every session. Session also included shoulder flexion activities with 2# bar: brought to 90 degrees, manually raised to 110 degrees by therapist, pt instructed to hold 5 seconds and lower slowly. 5 reps    Assessment:     Pt is showing progress with ROM and decreased pain in spite of her being unable to to demonstrate correct performance of HEP from week to week.    Patient Education/Response:     ongoing    Plans and Goals:     Cont per poc

## 2018-08-09 RX ORDER — HYDROCODONE BITARTRATE AND ACETAMINOPHEN 10; 325 MG/1; MG/1
1 TABLET ORAL EVERY 6 HOURS PRN
Qty: 40 TABLET | Refills: 0 | Status: SHIPPED | OUTPATIENT
Start: 2018-08-09 | End: 2018-09-04 | Stop reason: SDUPTHER

## 2018-08-09 NOTE — TELEPHONE ENCOUNTER
----- Message from Kenneth Silver sent at 8/8/2018  4:30 PM CDT -----  Type:  RX Refill Request    Who Called:  Patient  Refill or New Rx:  Refill  RX Name and Strength:  oxyCODONE-acetaminophen (PERCOCET) 5-325 mg per tablet   Preferred Pharmacy with phone number:    [facility] [address] [place] [address] & [address] [address]  Phone: [phone] Fax: [phone]  Local or Mail Order:  Local  Ordering Provider:  Same  Best Call Back Number:  [phone]

## 2018-08-14 ENCOUNTER — CLINICAL SUPPORT (OUTPATIENT)
Dept: REHABILITATION | Facility: HOSPITAL | Age: 58
End: 2018-08-14
Payer: MEDICARE

## 2018-08-14 DIAGNOSIS — M25.512 LEFT SHOULDER PAIN, UNSPECIFIED CHRONICITY: ICD-10-CM

## 2018-08-14 PROCEDURE — 97110 THERAPEUTIC EXERCISES: CPT | Mod: PN

## 2018-08-14 PROCEDURE — 97140 MANUAL THERAPY 1/> REGIONS: CPT | Mod: PN

## 2018-08-16 ENCOUNTER — CLINICAL SUPPORT (OUTPATIENT)
Dept: REHABILITATION | Facility: HOSPITAL | Age: 58
End: 2018-08-16
Payer: MEDICARE

## 2018-08-16 PROCEDURE — 97110 THERAPEUTIC EXERCISES: CPT | Mod: PN

## 2018-08-16 PROCEDURE — 97010 HOT OR COLD PACKS THERAPY: CPT | Mod: PN

## 2018-08-16 NOTE — PROGRESS NOTES
proTIME RECORD    Date:  08/16/2018    Start Time:  800  Stop Time:  855    PROCEDURES:    TIMED  Procedure Time Min.    Start:  Stop:    Therapeutic exercise Start:815  Stop:855 40    Start:  Stop:     Start:  Stop:          UNTIMED  Procedure Time Min.   BALWINDER heat Start:805  Stop:815 10    Start:  Stop:      Total Timed Minutes: 40   Total Timed Units:  3  Total Untimed Units:  1   Charges Billed/# of units:  4      Progress/Current Status    Subjective:     Patient ID: Steff Johnson is a 58 y.o. female.  Diagnosis: No diagnosis found.  Pain: 2 /10  Pt reports pain of 2 with activity. Heat administered to the L shoulder before tx today.    Objective:   Pt demonstrated HEP.  Exercises added to HEP today:  -L Internal rotation using towel  -Sleeper stretch for L shoulder  -passive external rotation using cane: shoulder at 45* and 90*    Assessment:     Pt tolerated new exercises and understands how to perform them safely. Pt reports no pain with exercises; time per stretch increased to 1.5min and 10 reps.    Patient Education/Response:     HEP graded up; pt demonstrated and will comply.    Plans and Goals:     Continue ongoing POC for L shoulder remediation.

## 2018-08-20 NOTE — PROGRESS NOTES
TIME RECORD    Date:  08/14/2018    Start Time:  0810  Stop Time:  0900    PROCEDURES:    TIMED  Procedure Time Min.   Manual therapy Start:0835  Stop:0900 25   Exercise Start:0810  Stop:0835 25    Start:  Stop:     Start:  Stop:          UNTIMED  Procedure Time Min.    Start:  Stop:     Start:  Stop:      Total Timed Minutes:  50  Total Timed Units:  4  Total Untimed Units:  0  Charges Billed/# of units:  4      Progress/Current Status    Subjective:     Patient ID: Steff Johnson is a 58 y.o. female.  Diagnosis:   1. Left shoulder pain, unspecified chronicity       Pain: 3 /10      Objective:     Session began with patient demonstration of HEP: needed instruction to perform correctly. Used incline boaed for shoulder flexion. Manual mobilizations and stretches.    Assessment:     Improved ROM    Patient Education/Response:     ongoing    Plans and Goals:     cont

## 2018-08-21 ENCOUNTER — CLINICAL SUPPORT (OUTPATIENT)
Dept: REHABILITATION | Facility: HOSPITAL | Age: 58
End: 2018-08-21
Payer: MEDICARE

## 2018-08-21 PROCEDURE — 97140 MANUAL THERAPY 1/> REGIONS: CPT | Mod: PN

## 2018-08-21 PROCEDURE — 97010 HOT OR COLD PACKS THERAPY: CPT | Mod: PN

## 2018-08-21 PROCEDURE — 97110 THERAPEUTIC EXERCISES: CPT | Mod: PN

## 2018-08-21 NOTE — PROGRESS NOTES
TIME RECORD    Date:  08/21/2018    Start Time:  800  Stop Time:  858    PROCEDURES:    TIMED  Procedure Time Min.    Start:  Stop:    Manual therapy Start:810  Stop:840 20   Therapeutic exercise Start:840  Stop:858 28    Start:  Stop:          UNTIMED  Procedure Time Min.   Heat  Start:800  Stop:810 10    Start:  Stop:      Total Timed Minutes:  48  Total Timed Units:  3  Total Untimed Units:  1  Charges Billed/# of units:  4      Progress/Current Status    Subjective:     Patient ID: Steff Johnson is a 58 y.o. female.  Diagnosis: L shoulder pain s/p surgery  Pain: 5 /10  Pt reports pain of 5/6 at the end of PROM when performed by therapist. Pt reports no pain at rest. Pt reports that she does not reach into her cabinets or anything above shoulder level with her L UE because she knows it is going to hurt.    Objective:   Pt sitting at edge of mat with heat for 10 mins to prepare shoulder for manual therapy including shoulder mobilizations.  Pt demonstrated her AROM of L UE during GH flexion, GH abduction, IR and ER, and HEP prior to mobilizations as an AROM measuring assessment.  Shoulder mobilizations:  -L scapula release, L deltoid tuberosity release. L GH release w/ posterior distraction.  Gentle prolonged stretching L UE x 2 mins each flexion, abduction, adduction, external rotation, internal rotation  Added to HEP and pt performed all reps during session:   -L cross arm stretch 1min x 3 twice daily  -Forward and backward shoulder rolls x 10, twice daily, with conscious positioning of scapula during the retraction and protraction of rotation.  -Door frame stretch 1 min x 3 twice daily.  -Elbow touches x10 twice daily    Assessment:    PROM increase of L UE during GH flexion, GH abduction, IR and ER,d after prolonged gentle stretching.  AROM of L UE during GH flexion, GH abduction, IR and ER increased after prolonged gentle stretching.    Patient Education/Response:     Patient demonstrated new therapeutic  exercises with precision and will incorporate into HEP.    Plans and Goals:     Continue with poc. tx and HEP ongoing to increase ROM in L shoulder.  I certify that I was present in the room directing the student in service delivery and guiding them using my skilled judgment. As the co-signing therapist I have reviewed the students documentation and am responsible for the treatment, assessment, and plan.   SAHARA Ware

## 2018-08-28 ENCOUNTER — CLINICAL SUPPORT (OUTPATIENT)
Dept: REHABILITATION | Facility: HOSPITAL | Age: 58
End: 2018-08-28
Payer: MEDICARE

## 2018-08-28 PROCEDURE — 97010 HOT OR COLD PACKS THERAPY: CPT | Mod: PN

## 2018-08-28 PROCEDURE — 97110 THERAPEUTIC EXERCISES: CPT | Mod: PN

## 2018-08-28 PROCEDURE — 97530 THERAPEUTIC ACTIVITIES: CPT | Mod: PN

## 2018-08-28 PROCEDURE — 97140 MANUAL THERAPY 1/> REGIONS: CPT | Mod: PN

## 2018-08-28 NOTE — PROGRESS NOTES
"TIME RECORD    Date:  08/28/2018    Start Time:  0805  Stop Time:  0858    PROCEDURES:    TIMED  Procedure Time Min.   Therapeutic Exercise Start:0815  Stop:0825 10   Manual Therapy Start:0825  Stop:0840 15   Therapeutic Exercise Start:0840  Stop:0858 18    Start:  Stop:          UNTIMED  Procedure Time Min.   heat Start:0805  Stop:0815 10    Start:  Stop:      Total Timed Minutes:  43  Total Timed Units:  3  Total Untimed Units:  1  Charges Billed/# of units:  4      Progress/Current Status    Subjective:     Patient ID: Steff Johnson is a 58 y.o. female.  Diagnosis:L shoulder impingement  Pain: 0 /10  Mrs. Johnson reports pain in shoulder only when heavy objects are in her hand (so she does not perform these activities) or when AROM is beyond 90* abd or 100* flexion. She states that she has followed her HEP as it was ordered.    Objective:   - Heat applied during the first 10 mins of session to prep GH joint for AROM, PROM, shoulder mobs.  - Mrs. Johnson demonstrated the ER cane exercise and the sleeper stretch. Reeducation needed for ER cane exercise and pt performed both exercises 3 x @ 1min each.  - Goniometer used to measure flexion 100*, abd 90*, and ER 50* prior to shoulder mobilizations.  - GH joint mobilization with inferior, superior, and posterior distraction performed by therapist.  - PROM abd stretch performed by therapist 3x @ 2 min each.  - 2 new exercises ordered today using yellow tband:   Seated flexion- one end of band under L foot, other end of band in left hand pronated, and pt using R hand to range the L GH flexion properly so that the trunk and    R shoulder do not "hike up". 10x twice daily.   Seated horizontal abd:one end of band under L foot, other end of band in left hand pronated, and pt using R hand to range the L GH into horizontal abd properly so    that the trunk and R shoulder do not "hike up". 10x twice daily.  - Pt instructed to perform corner stretch as many times per day as she " feels tight in the L shoulder, and holding for at least 1 min each time, preferably 3 sets each time.    Assessment:     Mrs. Johnson has improved ROM during this session. After shoulder mobs pt flexion increased to 135*, abd increased to 130*, and ER stayed the same at 50*. Pt responds well to shoulder mobs and this will happen each session.    Patient Education/Response:     Pt reeducated for ER cane exercise. Pt demonstrated tband exercises properly with full understanding.    Plans and Goals:     Continue poc including shoulder mobs at each session.    Gretta MERCER  I certify that I was present in the room directing the student in service delivery and guiding them using my skilled judgment. As the co-signing therapist I have reviewed the students documentation and am responsible for the treatment, assessment, and plan.   SAHARA Ware

## 2018-08-30 ENCOUNTER — CLINICAL SUPPORT (OUTPATIENT)
Dept: REHABILITATION | Facility: HOSPITAL | Age: 58
End: 2018-08-30
Payer: MEDICARE

## 2018-08-30 PROCEDURE — 97010 HOT OR COLD PACKS THERAPY: CPT | Mod: PN

## 2018-08-30 PROCEDURE — 97110 THERAPEUTIC EXERCISES: CPT | Mod: PN

## 2018-08-30 PROCEDURE — 97140 MANUAL THERAPY 1/> REGIONS: CPT | Mod: PN

## 2018-08-30 NOTE — PROGRESS NOTES
proTIME RECORD    Date:  08/30/2018    Start Time:  810  Stop Time:  900    PROCEDURES:    TIMED  Procedure Time Min.   Manual therapy Start:820  Stop:840 20   Therapeutic exercise Start:840  Stop:900 20    Start:  Stop:     Start:  Stop:          UNTIMED  Procedure Time Min.   heat Start:810  Stop:820 10    Start:  Stop:      Total Timed Minutes:  40  Total Timed Units:  2  Total Untimed Units:  1  Charges Billed/# of units:  3      Progress/Current Status    Subjective:     Patient ID: Steff Johnson is a 58 y.o. female.  Diagnosis: No diagnosis found.  Pain: 5 /10  Pt reports waking up with pain this morning and attributes it to possibly doing her HEP too late last night before bed.    Objective:   -Heat applied first 10min of session to prepare shoulder for stretching, mobs, and therapeutic exercise.  -Manual therapy consisted of shoulder mobilizations to the GH joint and aggressive passive stretching of L GH into flexion and abd.  -Mrs. Johnson demonstrated the cross arm stretch and the supine ER cane exercise incorrectly again today. She demonstrated her yellow tband exercises for flexion and horizontal add, which were added to her HEP 8/23/18, incorrectly as well.  -Pt is not stretching to end range during her HEP, active assist needed to achieve best range.  Assessment:   After being reeducated on the supine ER cane exercise procedure, she completed it 3x @ 1min each. ROM WNL ~70*  The seated ER cane exercise was completed WNL also ~70*.  Reeducated pt of proper procedure of tband exercises and therapist provided active assist to achieve last 10* of range.  Mrs. Johnson's ROM for L GH flexion is still 135* and abd 130* after aggressive stretching and shoulder mobilizations.  Pt does not appear to be  pushing herself to the end of her ROM when at home.  Patient Education/Response:     Pt usually seems surprised that she is performing exercises incorrectly. Instructed pt to increase use of L arm at home and to  continue to perform active assist with her R arm to range her L arm to full end range.    Plans and Goals:     Ongoing. Continue to challenge Mrs Johnson's abd, flexion and horizontal add, until functional ROM is attained.    Gretta MERCER  I certify that I was present in the room directing the student in service delivery and guiding them using my skilled judgment. As the co-signing therapist I have reviewed the students documentation and am responsible for the treatment, assessment, and plan.   SAHARA Ware

## 2018-08-31 DIAGNOSIS — Z79.4 TYPE 2 DIABETES MELLITUS WITH COMPLICATION, WITH LONG-TERM CURRENT USE OF INSULIN: Primary | ICD-10-CM

## 2018-08-31 DIAGNOSIS — E11.8 TYPE 2 DIABETES MELLITUS WITH COMPLICATION, WITH LONG-TERM CURRENT USE OF INSULIN: Primary | ICD-10-CM

## 2018-09-04 ENCOUNTER — TELEPHONE (OUTPATIENT)
Dept: FAMILY MEDICINE | Facility: CLINIC | Age: 58
End: 2018-09-04

## 2018-09-04 ENCOUNTER — CLINICAL SUPPORT (OUTPATIENT)
Dept: REHABILITATION | Facility: HOSPITAL | Age: 58
End: 2018-09-04
Attending: ORTHOPAEDIC SURGERY
Payer: MEDICARE

## 2018-09-04 PROCEDURE — 97110 THERAPEUTIC EXERCISES: CPT | Mod: PN,KX

## 2018-09-04 PROCEDURE — 97165 OT EVAL LOW COMPLEX 30 MIN: CPT | Mod: PN

## 2018-09-04 RX ORDER — HYDROCODONE BITARTRATE AND ACETAMINOPHEN 10; 325 MG/1; MG/1
1 TABLET ORAL EVERY 6 HOURS PRN
Qty: 40 TABLET | Refills: 0 | Status: ON HOLD | OUTPATIENT
Start: 2018-09-04 | End: 2021-07-04 | Stop reason: HOSPADM

## 2018-09-04 NOTE — PROGRESS NOTES
TIME RECORD    Date:  09/04/2018    Start Time:  0810  Stop Time:  0900    PROCEDURES:    TIMED  Procedure Time Min.   Therapeutic exercise Start:0820  Stop:0900 40    Start:  Stop:     Start:  Stop:     Start:  Stop:          UNTIMED  Procedure Time Min.   heat Start:0810  Stop:0820 10    Start:  Stop:      Total Timed Minutes:  40  Total Timed Units:  3  Total Untimed Units:  1  Charges Billed/# of units:  4      Progress/Current Status    Subjective:     Patient ID: Steff Johnson is a 58 y.o. female.  Diagnosis: L shoulder tendonitis/ adhesive capsulitis  Pain: 0 /10  Pt reports no pain with normal activity and reports that she is back to using the L arm for everything that she needs to, including lifting and carrying her 15mo grandson. Her ROM is visibly improved and she reports that she is able to reach over herself and others for things that she needs to grab or point to.    Objective:   -Heat applied for 10 mins prior to exercises.  -Pt demonstrated AROM for flexion (140*), Abd (120*), and ER (56*). Since her last session, her flexion has increased 40* and her Abd has increased 30*.   -Pt demonstrated yellow tband exercises for flexion and horizontal adduction; she had modified the flexion exercise to almost horizontal abduction. Reeducated her on proper form for seated flexion with the yellow tband.  -Added to HEP today:    ER and IR with yellow tband anchored in door frame 10reps each 2xday.    seated abduction with yellow tband 10reps 2xday.    Assessment:   Since her last session, her flexion has increased 40* and her Abd has increased 30*, so it seems that she is performing her HEP at home as instructed.  Mrs. Johnson's ER ROM is WFL, so strengthening ER and IR exercises with yellow tband in door frame were added today.  Mrs. Johnson continues to have ROM deficits during flexion and abd: tband exercises provided today will address this deficit.    Patient Education/Response:     Mrs. Johnson educated on new  tband exercises for ER, IR, and seated abd today; also reeducated on proper form of seated flexion. Mrs. Johnson has got to keep her elbow straight while performing these exercises to achieve benefit.    Plans and Goals:     Ongoing to remediate frozen L shoulder and achieve ROM WFL.    Gretta MERCER  I certify that I was present in the room directing the student in service delivery and guiding them using my skilled judgment. As the co-signing therapist I have reviewed the students documentation and am responsible for the treatment, assessment, and plan.   SHAARA Ware

## 2018-09-04 NOTE — TELEPHONE ENCOUNTER
----- Message from Florence Jacobsen MA sent at 9/4/2018  9:18 AM CDT -----  Contact: Self  Walk-In  HYDROcodone-acetaminophen (NORCO)  mg per tablet  SENT TO  [facility] [address] [place] [address] AT [facility]   [phone] (Phone)  [phone] (Fax)    Thanks

## 2018-09-04 NOTE — TELEPHONE ENCOUNTER
----- Message from Anita Dao sent at 9/4/2018  1:41 PM CDT -----    Type:  Sooner Apoointment Request    Caller is requesting a sooner appointment.  Caller declined first available appointment listed below.  Caller will not accept being placed on the waitlist and is requesting a message be sent to doctor.    Name of Caller:  pt  When is the first available appointment?  Canceling   Due to  Weather ,  Nothing   until   next   Year  ,  Pt  Wants to  Be  Fitted in   For  Sooner Symptoms:  Follow up Best Call Back Number: [phone]

## 2018-09-04 NOTE — TELEPHONE ENCOUNTER
Spoke to patient.  Patient cancelled appointment on 9/5 due to weather appointment scheduled for 9/7 patient confirmed appointment with call center.

## 2018-09-06 ENCOUNTER — CLINICAL SUPPORT (OUTPATIENT)
Dept: REHABILITATION | Facility: HOSPITAL | Age: 58
End: 2018-09-06
Attending: ORTHOPAEDIC SURGERY
Payer: MEDICARE

## 2018-09-06 PROCEDURE — 97110 THERAPEUTIC EXERCISES: CPT | Mod: PN,KX

## 2018-09-06 PROCEDURE — 97140 MANUAL THERAPY 1/> REGIONS: CPT | Mod: PN

## 2018-09-06 NOTE — PROGRESS NOTES
TIME RECORD    Date:  09/06/2018    Start Time:  0805  Stop Time:  09000    PROCEDURES:    TIMED  Procedure Time Min.   Therapeutic exercise Start:0815  Stop:0840 25   Manual therapy Start:0840  Stop:0900 20    Start:  Stop:     Start:  Stop:          UNTIMED  Procedure Time Min.   heat Start:0805  Stop:0815 10    Start:  Stop:      Total Timed Minutes:  45  Total Timed Units:  3  Total Untimed Units:  1  Charges Billed/# of units:  4      Progress/Current Status    Subjective:     Patient ID: Steff Johnson is a 58 y.o. female.  Diagnosis: No diagnosis found.  Pain: 2 /10  Mrs. Johnson reports stiffness upon waking this morning. She reports ~2/10 pain during gentle passive stretching during manual therapy this session. She reports no pain when she lifts her 15 mo grandson. She states that she is performing her HEP 2 x day.    Objective:     -Heat applied to L shoulder @ 10 mins prior to therapeutic exercise.  -goniometer used to measure    flexion @120*, decreased 20* since 9/4/18    abd @110*, decreased 10* since 9/4/18  -Pt demonstrated yellow tband exercises ordered on 9/4/18- reeducation needed. abd was not being performed properly. Also Mrs. Johnson held each end of the band in   each hand and pulled them apart (demonstrating a chest fly) which is not part of her HEP.   -She was unable to demonstrate the original stretches of her HEP correctly (wall stretch, shoulder rolls) and was reeducated on these: wall stretch to be held 1 min each,   not until she feels tired and shoulder rolls are to be an exaggerated roll that makes a full rotation, not a shoulder shrug.  -Manual Therapy included:  Shoulder mobilizations of elevation and depression with superior and inferior distraction.  Therapist performed PROM stretches to increase flexion and abd. Pt tolerated stretches past her active end range.    Assessment:     Mrs. Johnson has decreased ROM since her last visit on 9/4/18: decreased 20* flexion and 10* abd. She  needs reeducation at every visit as to the proper form for many of the exercises and stretches of her HEP. Not likely that she is doing her HEP BID.  PROM and shoulder mobs increased flexion to 130* and abd to 120* by end of tx, which is 10* more for each than she began the session with.    Patient Education/Response:   After reeducation, pt is able to perform the exercises correctly during the tx session. Pt does not seem bothered that she is performing them incorrectly at home. Pt educated to complete all stretches before engaging in the exercises with the yellow tband and to focus on the yellow tband flexion and abd and horizontal add and doing them properly.       Plans and Goals:     Ongoing- Pt will continue HEP to achieve ROM WFL for flexion and abd. Patient requires more visits 2/2 inability to perform HEP in correct form.    Gretta MERCER  I certify that I was present in the room directing the student in service delivery and guiding them using my skilled judgment. As the co-signing therapist I have reviewed the students documentation and am responsible for the treatment, assessment, and plan.   SAHARA Ware

## 2018-09-07 ENCOUNTER — OFFICE VISIT (OUTPATIENT)
Dept: FAMILY MEDICINE | Facility: CLINIC | Age: 58
End: 2018-09-07
Payer: MEDICARE

## 2018-09-07 VITALS
TEMPERATURE: 98 F | DIASTOLIC BLOOD PRESSURE: 90 MMHG | RESPIRATION RATE: 14 BRPM | HEART RATE: 58 BPM | SYSTOLIC BLOOD PRESSURE: 191 MMHG | OXYGEN SATURATION: 98 % | WEIGHT: 139.75 LBS | HEIGHT: 59 IN | BODY MASS INDEX: 28.17 KG/M2

## 2018-09-07 DIAGNOSIS — G89.29 CHRONIC LEFT SHOULDER PAIN: ICD-10-CM

## 2018-09-07 DIAGNOSIS — I15.2 HYPERTENSION ASSOCIATED WITH DIABETES: Primary | ICD-10-CM

## 2018-09-07 DIAGNOSIS — Z23 FLU VACCINE NEED: ICD-10-CM

## 2018-09-07 DIAGNOSIS — E11.59 HYPERTENSION ASSOCIATED WITH DIABETES: Primary | ICD-10-CM

## 2018-09-07 DIAGNOSIS — E11.9 DIABETIC EYE EXAM: ICD-10-CM

## 2018-09-07 DIAGNOSIS — Z79.4 TYPE 2 DIABETES MELLITUS WITH DIABETIC NEPHROPATHY, WITH LONG-TERM CURRENT USE OF INSULIN: ICD-10-CM

## 2018-09-07 DIAGNOSIS — Z01.00 DIABETIC EYE EXAM: ICD-10-CM

## 2018-09-07 DIAGNOSIS — M25.512 CHRONIC LEFT SHOULDER PAIN: ICD-10-CM

## 2018-09-07 DIAGNOSIS — E11.21 TYPE 2 DIABETES MELLITUS WITH DIABETIC NEPHROPATHY, WITH LONG-TERM CURRENT USE OF INSULIN: ICD-10-CM

## 2018-09-07 PROCEDURE — 99214 OFFICE O/P EST MOD 30 MIN: CPT | Mod: S$PBB,,, | Performed by: FAMILY MEDICINE

## 2018-09-07 PROCEDURE — 99999 PR PBB SHADOW E&M-EST. PATIENT-LVL IV: CPT | Mod: PBBFAC,,, | Performed by: FAMILY MEDICINE

## 2018-09-07 PROCEDURE — 3080F DIAST BP >= 90 MM HG: CPT | Mod: CPTII,,, | Performed by: FAMILY MEDICINE

## 2018-09-07 PROCEDURE — 90686 IIV4 VACC NO PRSV 0.5 ML IM: CPT | Mod: PBBFAC,PO

## 2018-09-07 PROCEDURE — 3046F HEMOGLOBIN A1C LEVEL >9.0%: CPT | Mod: CPTII,,, | Performed by: FAMILY MEDICINE

## 2018-09-07 PROCEDURE — 3077F SYST BP >= 140 MM HG: CPT | Mod: CPTII,,, | Performed by: FAMILY MEDICINE

## 2018-09-07 PROCEDURE — 3008F BODY MASS INDEX DOCD: CPT | Mod: CPTII,,, | Performed by: FAMILY MEDICINE

## 2018-09-07 PROCEDURE — 99214 OFFICE O/P EST MOD 30 MIN: CPT | Mod: PBBFAC,25,PO | Performed by: FAMILY MEDICINE

## 2018-09-07 NOTE — PATIENT INSTRUCTIONS
Diabetes (General Information)  Diabetes is a long-term health problem. It means your body does not make enough insulin. Or it may mean that your body cannot use the insulin it makes. Insulin is a hormone in your body. It lets blood sugar (glucose) reach the cells in your body. All of your cells need glucose for fuel.  When you have diabetes, the glucose in your blood builds up because it cannot get into the cells. This buildup is called high blood sugar (hyperglycemia).  Your blood sugar level depends on several things. It depends on what kind of food you eat and how much of it you eat. It also depends on how much exercise you get, and how much insulin you have in your body. Eating too much of the wrong kinds of food or not taking diabetes medicine on time can cause high blood sugar. Infections can cause high blood sugar even if you are taking medicines correctly.  These things can also cause low blood sugar:  · Missing meals  · Not eating enough food  · Taking too much diabetes medicine  Diabetes can cause serious problems over time if you do not get treated. These problems include heart disease, stroke, kidney failure, and blindness. They also include nerve pain or loss of feeling in your legs and feet, and gangrene of the feet. By keeping your blood sugar under control you can prevent or delay these problems.  Normal blood sugar levels are 80 to 100 before a meal and less than 180 in the 1 to 2 hours after a meal.  Home care  Follow these guidelines when caring for yourself at home:  · Follow the diet your healthcare provider gives you. Take insulin or other diabetes medicine exactly as told to.  · Watch your blood sugar as you are told to. Keep a log of your results. This will help your provider change your medicines to keep your blood sugar under control.  · Try to reach your ideal weight. You may be able to cut back on or not have to take diabetes medicine if you eat the right foods and get exercise.  · Do  not smoke. Smoking worsens the effects of diabetes on your circulation. You are much more likely to have a heart attack if you have diabetes and you smoke.  · Take good care of your feet. If you have lost feeling in your feet, you may not see an injury or infection. Check your feet and between your toes at least once a week.  · Wear a medical alert bracelet or necklace, or carry a card in your wallet that says you have diabetes. This will help healthcare providers give you the right care if you get very ill and cannot tell them that you have diabetes.  Sick day plan  If you get a cold, the flu, or a bacterial or viral infection, take these steps:  · Look at your diabetes sick plan and call your healthcare provider as you were told to. You may need to call your provider right away if:  ¨ Your blood sugar is above 240 while taking your diabetes medicine  ¨ Your urine ketone levels are above normal or high  ¨ You have been vomiting more than 6 hours  ¨ You have trouble breathing or your breath ha s a fruity smell  ¨ You have a high fever  ¨ You have a fever for several days and you are not getting better  ¨ You get light-headed and are sleepier than usual  · Keep taking your diabetes pills (oral medicine) even if you have been vomiting and are feeling sick. Call your provider right away because you may need insulin to lower your blood sugar until you recover from your illness.  · Keep taking your insulin even if you have been vomiting and are feeling sick. Call your provider right away to ask if you need to change your insulin dose. This will depend on your blood sugar results.  · Check your blood sugar every 2 to 4 hours, or at least 4 times a day.  · Check your ketones often. If you are vomiting and having diarrhea, watch them more often.  · Do not skip meals. Try to eat small meals on a regular schedule. Do this even if you do not feel like eating.  · Drink water or other liquids that do not have caffeine or  calories. This will keep you from getting dehydrated. If you are nauseated or vomiting, takes small sips every 5 minutes. To prevent dehydration try to drink a cup (8 ounces) of fluids every hour while you are awake.  General care  Always bring a source of fast-acting sugar with you in case you have symptoms of low blood sugar (below 70). At the first sign of low blood sugar, eat or drink 15 to 20 grams of fast-acting sugar to raise your blood sugar. Examples are:  · 3 to 4 glucose tablets. You can buy these at most [facility].  · 4 ounces (1/2 cup) of regular (not diet) soft drinks  · 4 ounces (1/2 cup) of any fruit juice  · 8 ounces (1 cup) of milk  · 5 to 6 pieces of hard candy  · 1 tablespoon of honey  Check your blood sugar 15 minutes after treating yourself. If it is still below 70, take 15 to 20 more grams of fast-acting sugar. Test again in 15 minutes. If it returns to normal (70 or above), eat a snack or meal to keep your blood sugar in a safe range. If it stays low, call your doctor or go to an emergency room.  Follow-up care  Follow-up with your healthcare provider, or as advised. For more information about diabetes, visit the American Diabetes Association website at www.diabetes.org or call 336-895-3279.  When to seek medical advice  Call your healthcare provider right away if you have any of these symptoms of high blood sugar:  · Frequent urination  · Dizziness  · Drowsiness  · Thirst  · Headache  · Nausea or vomiting  · Abdominal pain  · Eyesight changes  · Fast breathing  · Confusion or loss of consciousness  Also call your provider right away if you have any of these signs of low blood sugar:  · Fatigue  · Headache  · Shakes  · Excess sweating  · Hunger  · Feeling anxious or restless  · Eyesight changes  · Drowsiness  · Weakness  · Confusion or loss of consciousness  Call 911  Call for emergency help right away if any of these occur:  · Chest pain or shortness of breath  · Dizziness or  fainting  · Weakness of an arm or leg or one side of the face  · Trouble speaking or seeing   Date Last Reviewed: 6/1/2016 © 2000-2017 [employer] [address] [place] [address], [place] [address]. All rights reserved. This information is not intended as a substitute for professional medical care. Always follow your healthcare professional's instructions.          Established High Blood Pressure    High blood pressure (hypertension) is a chronic disease. Often, healthcare providers dont know what causes it. But it can be caused by certain health conditions and medicines.  If you have high blood pressure, you may not have any symptoms. If you do have symptoms, they may include headache, dizziness, changes in your vision, chest pain, and shortness of breath. But even without symptoms, high blood pressure thats not treated raises your risk for heart attack and stroke. High blood pressure is a serious health risk and shouldnt be ignored.  A blood pressure reading is made up of two numbers: a higher number over a lower number. The top number is the systolic pressure. The bottom number is the diastolic pressure. A normal blood pressure is a systolic pressure of  less than 120 over a diastolic pressure of less than 80. You will see your blood pressure readings written together. For example, a person with a systolic pressure of 188 and a diastolic pressure of 78 will have 118/78 written in the medical record.  High blood pressure is when either the top number is 140 or higher, or the bottom number is 90 or higher. This must be the result when taking your blood pressure a number of times. The blood pressures between normal and high are called prehypertension.  Home care  If you have high blood pressure, you should do what is listed below to lower your blood pressure. If you are taking medicines for high blood pressure, these methods may reduce or end your need for medicines in the future.  · Begin a weight-loss  program if you are overweight.  · Cut back on how much salt you get in your diet. Heres how to do this:  ¨ Dont eat foods that have a lot of salt. These include olives, pickles, smoked meats, and salted potato chips.  ¨ Dont add salt to your food at the table.  ¨ Use only small amounts of salt when cooking.  · Start an exercise program. Talk with your healthcare provider about the type of exercise program that would be best for you. It doesn't have to be hard. Even brisk walking for 20 minutes 3 times a week is a good form of exercise.  · Dont take medicines that stimulate the heart. This includes many over-the-counter cold and sinus decongestant pills and sprays, as well as diet pills. Check the warnings about hypertension on the label. Before buying any over-the-counter medicines or supplements, always ask the pharmacist about the product's potential interaction with your high blood pressure and your high blood pressure medicines.  · Stimulants such as amphetamine or cocaine could be deadly for someone with high blood pressure. Never take these.  · Limit how much caffeine you get in your diet. Switch to caffeine-free products.  · Stop smoking. If you are a long-time smoker, this can be hard. Talk to your healthcare provider about medicines and nicotine replacement options to help you. Also, enroll in a stop-smoking program to make it more likely that you will quit for good.  · Learn how to handle stress. This is an important part of any program to lower blood pressure. Learn about relaxation methods like meditation, yoga, or biofeedback.  · If your provider prescribed medicines, take them exactly as directed. Missing doses may cause your blood pressure get out of control.  · If you miss a dose or doses, check with your healthcare provider or pharmacist about what to do.  · Consider buying an automatic blood pressure machine. Ask your provider for a recommendation. You can get one of these at most  pharmacies.     The American Heart Association recommends the following guidelines for home blood pressure monitoring:  · Don't smoke or drink coffee for 30 minutes before taking your blood pressure.  · Go to the bathroom before the test.  · Relax for 5 minutes before taking the measurement.  · Sit with your back supported (don't sit on a couch or soft chair); keep your feet on the floor uncrossed. Place your arm on a solid flat surface (like a table) with the upper part of the arm at heart level. Place the middle of the cuff directly above the eye of the elbow. Check the monitor's instruction manual for an illustration.  · Take multiple readings. When you measure, take 2 to 3 readings one minute apart and record all of the results.  · Take your blood pressure at the same time every day, or as your healthcare provider recommends.  · Record the date, time, and blood pressure reading.  · Take the record with you to your next medical appointment. If your blood pressure monitor has a built-in memory, simply take the monitor with you to your next appointment.  · Call your provider if you have several high readings. Don't be frightened by a single high blood pressure reading, but if you get several high readings, check in with your healthcare provider.  · Note: When blood pressure reaches a systolic (top number) of 180 or higher OR diastolic (bottom number) of 110 or higher, seek emergency medical treatment.  Follow-up care  You will need to see your healthcare provider regularly. This is to check your blood pressure and to make changes to your medicines. Make a follow-up appointment as directed. Bring the record of your home blood pressure readings to the appointment.  When to seek medical advice  Call your healthcare provider right away if any of these occur:  · Blood pressure reaches a systolic (upper number) of 180 or higher OR a diastolic (bottom number) of 110 or higher  · Chest pain or shortness of breath  · Severe  headache  · Throbbing or rushing sound in the ears  · Nosebleed  · Sudden severe pain in your belly (abdomen)  · Extreme drowsiness, confusion, or fainting  · Dizziness or spinning sensation (vertigo)  · Weakness of an arm or leg or one side of the face  · You have problems speaking or seeing   Date Last Reviewed: 12/1/2016  © 0967-8042 [employer]. [address] [place] [address], [place] [address]. All rights reserved. This information is not intended as a substitute for professional medical care. Always follow your healthcare professional's instructions.

## 2018-09-10 NOTE — PROGRESS NOTES
Subjective:       Patient ID: Steff Johnson is a 58 y.o. female.    Chief Complaint: Follow-up (4mth f/u)    HPI  Review of Systems   Constitutional: Negative for fatigue and unexpected weight change.   Respiratory: Negative for chest tightness and shortness of breath.    Cardiovascular: Negative for chest pain, palpitations and leg swelling.   Gastrointestinal: Negative for abdominal pain.   Musculoskeletal: Positive for arthralgias.   Neurological: Negative for dizziness, syncope, light-headedness and headaches.       Patient Active Problem List   Diagnosis    Hypertension associated with diabetes    COPD (chronic obstructive pulmonary disease)    Type 2 diabetes mellitus with hyperglycemia, with long-term current use of insulin    Proteinuria    Complete tear of left rotator cuff    Left shoulder pain    Shoulder stiffness, left    Shoulder weakness    Type 2 diabetes mellitus with diabetic nephropathy    Mixed hyperlipidemia     Patient is here for a chronic conditions follow up.    No visits with results within 1 Month(s) from this visit.   Latest known visit with results is:   Lab Visit on 06/06/2018   Component Date Value Ref Range Status    Protein, Urine Random 06/06/2018 23* 0 - 15 mg/dL Final    Creatinine, Random Ur 06/06/2018 240.0  15.0 - 325.0 mg/dL Final    Prot/Creat Ratio, Ur 06/06/2018 0.10  0.00 - 0.20 Final       Still having chronic shoulder pain.  Has had surgery and now doing therapy Dr. Page    ENdocrine following.  Labs and f/u planned 10/18.  BS improving.  In am occ 69.  Highest 128.  Checks tid before meals    Nephrology appt 12/18  Objective:      Physical Exam   Constitutional: She is oriented to person, place, and time. She appears well-developed and well-nourished.   Cardiovascular: Normal rate, regular rhythm and normal heart sounds.   Pulmonary/Chest: Effort normal and breath sounds normal.   Musculoskeletal: She exhibits no edema.   Neurological: She is alert and  oriented to person, place, and time.   Skin: Skin is warm and dry.   Psychiatric: She has a normal mood and affect.   Nursing note and vitals reviewed.      Assessment:       1. Hypertension associated with diabetes    2. Flu vaccine need    3. Diabetic eye exam    4. Type 2 diabetes mellitus with diabetic nephropathy, with long-term current use of insulin    5. Chronic left shoulder pain        Plan:         1. Hypertension associated with diabetes  Uncontrolled.  Did not take meds today and has been running better at home. No sx.  I counseled the patient on HTN education, management and recommendations.  I recommended weight loss toward a BMI < 25, avoidance of salt and the DASH diet, regular cardio exercise a minimum of 150 minutes per week and medications if indicated.  Printed materials were given. The goal is < 140/90 unless otherwise specified.      2. Flu vaccine need  Immunize today.  Counseled patient on risks, benefits and side effects.  Patient elected to proceed with vaccination.    - Influenza - Quadrivalent (3 years & older) (PF)    3. Diabetic eye exam  Refer  - Ambulatory referral to Optometry    4. Type 2 diabetes mellitus with diabetic nephropathy, with long-term current use of insulin  Cont endocrine monitoring.  Counseled on taking bedtime snack to avoid morning lows. If BS < 70 more than twice in week then notify endocrine to decrease insulin    5. Chronic left shoulder pain  Cont ortho treatment and therapy          Time spent with patient: 20 minutes    Patient with be reevaluated in 3 months with SAIMA and 6 months or sooner prn. RTC nurse BP check in 2 weeks    Greater than 50% of this visit was spent counseling as described in above documentation:Yes

## 2018-09-11 ENCOUNTER — CLINICAL SUPPORT (OUTPATIENT)
Dept: REHABILITATION | Facility: HOSPITAL | Age: 58
End: 2018-09-11
Attending: ORTHOPAEDIC SURGERY
Payer: MEDICARE

## 2018-09-11 PROCEDURE — 97110 THERAPEUTIC EXERCISES: CPT | Mod: PN

## 2018-09-11 PROCEDURE — 97010 HOT OR COLD PACKS THERAPY: CPT | Mod: PN

## 2018-09-11 NOTE — PROGRESS NOTES
TIME RECORD    Date:  09/11/2018    Start Time:  0810  Stop Time:  0858    PROCEDURES:    TIMED  Procedure Time Min.   Therapeutic exercise Start:0820  Stop:0858 38    Start:  Stop:     Start:  Stop:     Start:  Stop:          UNTIMED  Procedure Time Min.   heat Start:0810  Stop:0820 10    Start:  Stop:      Total Timed Minutes:  38  Total Timed Units:  3  Total Untimed Units:  1  Charges Billed/# of units:  4      Progress/Current Status    Subjective:     Patient ID: Steff Johnson is a 58 y.o. female.  Diagnosis: No diagnosis found.  Pain: 0 /10  Pt reports that she feels tight this morning and that her pain is ok. She says that she has been doing her exercises. During the sleeper stretch Mrs. Johnson was asked how many reps she completes and she replied 10. Then she stated that she just does them whenever she is watching T.V. And laying on the couch w/ grandson. She holds for ~1min.  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    Objective:     Heat applied to L shoulder for 10 min prior to exercise.  Pt demonstrated HEP for L shoulder, including stretches and exercises w/ yellow tband.-- Mrs. Johnson is not completing her HEP correctly.  -Supine ER with cane is continuously attempted improperly at each demonstration. Pt is achieving elbow extension with this exercise instead of GH ER.  -seated ER with cane is being attempted improperly at each demonstration- Pt is achieving elbow extension with this exercise instead of GH ER.  -ER rotation using yellow tband was started in the same position as IR rotation with the yellow tband, Mrs. Johnson change her position by moving diagonally away from the door and asked if this was correct for ER.  -Horizontal adduction with the yellow tband was started improperly- band was on the outside of the L leg instead of on the inside.  -Flexion w/ yellow tband  was not raised directly in front of body, and once at end ROM, Mrs. Johnson kept holding it, when the proper exercise is for strengthening and needs to be held only a second and then slowly controlled against resistance to release. She said she's been holding it a minute.    Assessment:     -After there ex, pt flexion and abduction were measured and are both at 130*. This is the same as the last 2 visits and it appears that this may be the normal range for pt at this time, due to her inability to complete her HEP as ordered, she is now at 4 months of visits and very slow progress.  Today photographs were sent to the pat on her phone of her doing HEP correctly.+  Patient must be able to complete HEP correctly before discharge.    Patient Education/Response:     Mrs. Johnson was reeducated on the exercises that she was performing incorrectly. She is compliant and changes her hand placement and completes the exercises correctly with verbal cueing from therapist.    Plans and Goals:     Mrs. Johnson is able to receive pictures on her phone. Today pictures were taken of her correctly completing ER & IR w/ yellow tband, ER w/ cane seated. ER w/ cane supine, and flexion, abd, & horizontal add w/ yellow tband. The pictures will be sent to her phone so that she has a more personal reference to guide her HEP. Mrs. Johnson will demonstrate HEP properly at next visit.    SYLVIE Norris LOTR

## 2018-09-13 ENCOUNTER — OFFICE VISIT (OUTPATIENT)
Dept: ORTHOPEDICS | Facility: CLINIC | Age: 58
End: 2018-09-13
Payer: MEDICARE

## 2018-09-13 VITALS — HEIGHT: 59 IN | WEIGHT: 139 LBS | BODY MASS INDEX: 28.02 KG/M2

## 2018-09-13 DIAGNOSIS — M25.612 SHOULDER STIFFNESS, LEFT: Primary | ICD-10-CM

## 2018-09-13 DIAGNOSIS — M75.122 COMPLETE TEAR OF LEFT ROTATOR CUFF: ICD-10-CM

## 2018-09-13 PROCEDURE — 99212 OFFICE O/P EST SF 10 MIN: CPT | Mod: PBBFAC,PN | Performed by: ORTHOPAEDIC SURGERY

## 2018-09-13 PROCEDURE — 99999 PR PBB SHADOW E&M-EST. PATIENT-LVL II: CPT | Mod: PBBFAC,,, | Performed by: ORTHOPAEDIC SURGERY

## 2018-09-13 PROCEDURE — 3008F BODY MASS INDEX DOCD: CPT | Mod: CPTII,,, | Performed by: ORTHOPAEDIC SURGERY

## 2018-09-13 PROCEDURE — 99212 OFFICE O/P EST SF 10 MIN: CPT | Mod: S$PBB,,, | Performed by: ORTHOPAEDIC SURGERY

## 2018-09-13 NOTE — PROGRESS NOTES
"  Past Medical History:   Diagnosis Date    Arthritis     COPD (chronic obstructive pulmonary disease)     Diabetes mellitus     Hypertension     Wears glasses        Past Surgical History:   Procedure Laterality Date    ARTHROSCOPY-SHOULDER WITH SUBACROMIAL DECOMPRESSION Left 3/16/2018    Performed by [facility]i [facility]ina [facility] at [facility] OR    COLONOSCOPY N/A 4/11/2017    Procedure: COLONOSCOPY;  Surgeon: Jared Antonio MD;  Location: [facility] ENDO;  Service: Endoscopy;  Laterality: N/A;    COLONOSCOPY N/A 4/11/2017    [facility]formed by Jared Antonio [facility] at [facility] ENDO    QLYGFICG-SBQIGMSY-WYCDMI END Left 3/16/2018    [facility] by [facility] at [facility] OR    HYSTERECTOMY      OOPHORECTOMY      REPAIR ROTATOR CUFF ARTHROSCOPIC Left 3/16/2018    [facility] at [facility] OR    SHOULDER SURGERY      R       Current Outpatient Medications   Medication Sig    amlodipine-valsartan (EXFORGE)  mg per tablet Take 1 tablet by mouth once daily.    atorvastatin (LIPITOR) 20 MG tablet Take 1 tablet (20 mg total) by mouth once daily.    BLOOD PRESSURE CUFF Misc 1 kit by Misc.(Non-Drug; Combo Route) route 3 (three) times daily.    BLOOD PRESSURE CUFF Misc 1 Units by Misc.(Non-Drug; Combo Route) route once daily.    blood sugar diagnostic (BLOOD GLUCOSE TEST) Strp 1 strip by Misc.(Non-Drug; Combo Route) route 4 (four) times daily.    blood sugar diagnostic Strp 1 strip four times daily before meals and bedtime. True Metrix or equivalent covered by insurance. Diagnosis: E11.65    blood-glucose meter kit 1 each by Other route 4 (four) times daily. Use as instructed    cloNIDine 0.1 mg/24 hr td ptwk (CATAPRES) 0.1 mg/24 hr Place 1 patch onto the skin every 7 days.    clotrimazole-betamethasone 1-0.05% (LOTRISONE) cream Apply topically 2 (two) times daily.    COMFORT EZ PEN NEEDLES 31 gauge x 3/16" Ndle 1 each by Misc.(Non-Drug; Combo Route) route 2 (two) times daily.    " "glimepiride (AMARYL) 2 MG tablet Take 1 tablet (2 mg total) by mouth before breakfast.    HYDROcodone-acetaminophen (NORCO)  mg per tablet Take 1 tablet by mouth every 6 (six) hours as needed for Pain.    insulin degludec 100 unit/mL (3 mL) InPn Inject 30 Units into the skin once daily.    lancets 33 gauge Misc 1 lancet by Misc.(Non-Drug; Combo Route) route 4 (four) times daily before meals and nightly. For True Metrix system. Diagnosis: E11.65    lidocaine-prilocaine (EMLA) cream as needed.     metoprolol succinate (TOPROL-XL) 200 MG 24 hr tablet Take 1 tablet (200 mg total) by mouth once daily.    pen needle, diabetic (PEN NEEDLE) 30 gauge x 5/16" Ndle Inject 2 x /day    spironolactone (ALDACTONE) 25 MG tablet Take 1 tablet (25 mg total) by mouth every morning.    tiotropium (SPIRIVA WITH HANDIHALER) 18 mcg inhalation capsule Inhale 1 capsule (18 mcg total) into the lungs once daily.    tiZANidine (ZANAFLEX) 4 MG tablet TAKE 1 TABLET(4 MG) BY MOUTH EVERY 6 HOURS AS NEEDED FOR MUSCLE SPASM    hydrALAZINE (APRESOLINE) 50 MG tablet Take 2 tablets (100 mg total) by mouth every 12 (twelve) hours. (Patient taking differently: Take 100 mg by mouth every 12 (twelve) hours. TAKES AM ONLY)     No current facility-administered medications for this visit.        Review of patient's allergies indicates:  No Known Allergies    Family History   Problem Relation Age of Onset    Diabetes Mother     Asthma Mother     Hypertension Mother     Diabetes Father     Diabetes Brother     Hypertension Brother     Anemia Daughter        Social History     Socioeconomic History    Marital status: Single     Spouse name: Not on file    Number of children: Not on file    Years of education: Not on file    Highest education level: Not on file   Social Needs    Financial resource strain: Not on file    Food insecurity - worry: Not on file    Food insecurity - inability: Not on file    Transportation needs - " medical: Not on file    Transportation needs - non-medical: Not on file   Occupational History     Comment: disabled due to shoulder problems   Tobacco Use    Smoking status: Never Smoker    Smokeless tobacco: Never Used   Substance and Sexual Activity    Alcohol use: No    Drug use: No    Sexual activity: Not Currently   Other Topics Concern    Not on file   Social History Narrative    Not on file       Chief Complaint:   Chief Complaint   Patient presents with    Shoulder Pain     6 week followup s/p RCR 3/16/18       Date of surgery: March 16, 2018    History of present illness: 58-year-old female underwent left rotator cuff repair for a massive retracted rotator cuff tear.  Pain is an 6 out of 10.  Patient had a suture bridge technique performed.  We used suture tape.  She has been working with physical therapy and finally starting to make a little progress on her range of motion. I received a note from the occupational therapist that she poorly performs the home exercises though and has been retrained extensively but has difficulty maintaining proper form.    Review of Systems:    Musculoskeletal:  See HPI        Physical Examination:    Vital Signs:    There were no vitals filed for this visit.    Body mass index is 28.07 kg/m².    This a well-developed, well nourished patient in no acute distress.  They are alert and oriented and cooperative to examination.  Pt. walks without an antalgic gait.      Examination left shoulder shows healed surgical portals.  No erythema or drainage.  No bruising.  Much less stiff in irritable than previous visits.  Forward flexion of about 160°.  External rotation about 80°.    X-rays: None     Assessment:: Status post suture bridge rotator cuff repair for massive tear    Plan:  She needs to continue with the occupational therapy.  Follow-up in 8 weeks.      This note was created using Dragon voice recognition software that occasionally misinterpreted phrases or  words.

## 2018-09-18 ENCOUNTER — CLINICAL SUPPORT (OUTPATIENT)
Dept: REHABILITATION | Facility: HOSPITAL | Age: 58
End: 2018-09-18
Attending: ORTHOPAEDIC SURGERY
Payer: MEDICARE

## 2018-09-18 DIAGNOSIS — M25.612 SHOULDER STIFFNESS, LEFT: ICD-10-CM

## 2018-09-18 DIAGNOSIS — R29.898 SHOULDER WEAKNESS: ICD-10-CM

## 2018-09-18 DIAGNOSIS — M75.122 COMPLETE TEAR OF LEFT ROTATOR CUFF: Primary | ICD-10-CM

## 2018-09-18 PROCEDURE — 97110 THERAPEUTIC EXERCISES: CPT | Mod: PN

## 2018-09-18 NOTE — PROGRESS NOTES
TIME RECORD    Date:  09/18/2018    Start Time:  0810  Stop Time:  0855    PROCEDURES:    TIMED  Procedure Time Min.   exercise Start:0810  Stop:0855 45    Start:  Stop:     Start:  Stop:     Start:  Stop:          UNTIMED  Procedure Time Min.    Start:  Stop:     Start:  Stop:      Total Timed Minutes:  45  Total Timed Units:  3  Total Untimed Units:  0  Charges Billed/# of units:  3      Progress/Current Status    Subjective:     Patient ID: Steff Johnson is a 58 y.o. female.  Diagnosis:   1. Complete tear of left rotator cuff     2. Shoulder stiffness, left     3. Shoulder weakness       Pain: 0 /10  Pt reports difficulty with household management but now is able to fasten bra.      Objective:     Pt verified that she received the photographs that were sent to her phone. Photos were of patient doing every exercise in her HEP correctly.  Patient was able to demonstrate all exercises correctly with minimal verbal cuing.    Assessment:     Major progress with patient using photos of herself doing HEP correctly.    Patient Education/Response:     Ongoing, photos really helped pt to complete HEP at home.     Plans and Goals:     3-4 visits before discharge to insure that HEP is done correctly.

## 2018-09-19 ENCOUNTER — OFFICE VISIT (OUTPATIENT)
Dept: OPTOMETRY | Facility: CLINIC | Age: 58
End: 2018-09-19
Payer: MEDICARE

## 2018-09-19 DIAGNOSIS — H26.9 CORTICAL CATARACT: ICD-10-CM

## 2018-09-19 DIAGNOSIS — E11.3293 MILD NONPROLIFERATIVE DIABETIC RETINOPATHY OF BOTH EYES WITHOUT MACULAR EDEMA ASSOCIATED WITH TYPE 2 DIABETES MELLITUS: Primary | ICD-10-CM

## 2018-09-19 DIAGNOSIS — H52.7 REFRACTIVE ERROR: ICD-10-CM

## 2018-09-19 DIAGNOSIS — H25.13 NUCLEAR SCLEROSIS, BILATERAL: ICD-10-CM

## 2018-09-19 PROCEDURE — 99999 PR PBB SHADOW E&M-EST. PATIENT-LVL II: CPT | Mod: PBBFAC,,, | Performed by: OPTOMETRIST

## 2018-09-19 PROCEDURE — 92015 DETERMINE REFRACTIVE STATE: CPT | Mod: ,,, | Performed by: OPTOMETRIST

## 2018-09-19 PROCEDURE — 92004 COMPRE OPH EXAM NEW PT 1/>: CPT | Mod: S$PBB,,, | Performed by: OPTOMETRIST

## 2018-09-19 PROCEDURE — 99212 OFFICE O/P EST SF 10 MIN: CPT | Mod: PBBFAC,PO | Performed by: OPTOMETRIST

## 2018-09-19 NOTE — LETTER
September 19, 2018      Cristina Ren MD  [address]  [place] [address]           [facility] - Optometry  [address] [place] [address] [address]  [facility] [address]  Phone: [phone]          Patient: Steff Johnson   MR Number: [id number]   YOB: 1960   Date of Visit: 9/19/2018       Dear Dr. Cristina Ren:    Thank you for referring Steff Johnson to me for evaluation. Attached you will find relevant portions of my assessment and plan of care.    If you have questions, please do not hesitate to call me. I look forward to following Steff Johnson along with you.    Sincerely,    Sai Johnson, OD    Enclosure  CC:  No Recipients    If you would like to receive this communication electronically, please contact [email or web address] or [phone] to request more information on [employer] access.    For providers and/or their staff who would like to refer a patient to [facility], please contact us through our one-stop-shop provider referral line, Vikram Yu, at [phone].    If you feel you have received this communication in error or would no longer like to receive these types of communications, please e-mail [email or web address]

## 2018-09-19 NOTE — PROGRESS NOTES
HPI     Presenting Complaint:Pt here toady for yearly diabetic eye exam.  DLE: 1   year     Lab Results       Component                Value               Date                       HGBA1C                   9.6 (H)             04/24/2018                 HGBA1C                   10.3 (H)            01/31/2018                 HGBA1C                   10.6 (H)            11/28/2017                Pt states vision gets occasionally blurry.     Ophthalmic medication / drops: None      (-) headaches  (-) diplopia   (-) flashes / (-) floaters      Last edited by Sai Johnson, OD on 9/19/2018 10:05 AM. (History)            Assessment /Plan     For exam results, see Encounter Report.    Mild nonproliferative diabetic retinopathy of both eyes without macular edema associated with type 2 diabetes mellitus    Nuclear sclerosis, bilateral    Cortical cataract    Refractive error      Mild NPDR OU, no CSME. Few dot hemes, discussed findings. Discussed possible ocular affects of uncontrolled blood sugar with patient. Recommended continued strong blood sugar control and continued care with PCP. Return in 6 months for DM DFE, sooner as needed.    Mild to moderate cataracts OU, non-visually significant at this time. Discussed possible ocular affects of cataracts. Acceptable BCVA OU. Discussed treatment options. Surgery not recommended at this time. Monitor yearly.     Dispensed updated spectacle Rx. Discussed various spectacle lens options. Discussed adaptation period to new specs.  Demonstrated new spec Rx vs current specs in phoropter with patient satisfaction.      RTC in 6 months for DM DFE, or sooner prn.

## 2018-09-20 ENCOUNTER — CLINICAL SUPPORT (OUTPATIENT)
Dept: REHABILITATION | Facility: HOSPITAL | Age: 58
End: 2018-09-20
Attending: ORTHOPAEDIC SURGERY
Payer: MEDICARE

## 2018-09-20 PROCEDURE — 97110 THERAPEUTIC EXERCISES: CPT | Mod: KX

## 2018-09-20 PROCEDURE — 97010 HOT OR COLD PACKS THERAPY: CPT | Mod: PN,KX

## 2018-09-20 NOTE — PROGRESS NOTES
TIME RECORD    Date:  09/20/2018    Start Time:  810  Stop Time:  900    PROCEDURES:    TIMED  Procedure Time Min.   Therapeutic exercise Start:820  Stop:900 40    Start:  Stop:     Start:  Stop:     Start:  Stop:          UNTIMED  Procedure Time Min.   heat Start:810  Stop:820 10    Start:  Stop:      Total Timed Minutes:  40  Total Timed Units:  3  Total Untimed Units:  1  Charges Billed/# of units:  4      Progress/Current Status    Subjective:     Patient ID: Steff Johnson is a 58 y.o. female.  Diagnosis: L shoulder weakness and stiffness s/p complete rotator cuff repair  Pain:   Pt stated that she is able to reach items in her cabinets without pain and she doesn't have pain in the mornings anymore. Pt reports that she has been completing HEP BID.    Objective:   Heat applied to L shoulder while supine.  Range of flexion measured after heat 130* and abduction 115*  Pt demonstrated all reps of HEP. Pt needed verbal cues to keep elbow at side during ER exercise with cane and with yellow tband.  Range of flexion measured after exercise 130* and abduction 120*    Assessment:   Mrs. Johnson seems to be more correctly engaged in her HEP. 9/6/18 measurements were taken using goniometer: flexion and abd were as they are today. Mrs. Johnson has reached functional ROM w/o pain. She is instructed to continue all exercises in HEP and to provide more resistance with the yellow tband during HEP.    Patient Education/Response:   Instructed pt that The yellow tband should give resistance and not be too easy or too difficult. She verbalized understanding.    Plans and Goals:     Continue current HEP and add more resistance while using the yellow tband during HEP BID. Will establish strengthening HEP prior to discharge.    SYLVIE Norris  I certify that I was present in the room directing the student in service delivery and guiding them using my skilled judgment. As the co-signing therapist I have reviewed the students  documentation and am responsible for the treatment, assessment, and plan.   SAHARA Ware

## 2018-09-27 ENCOUNTER — CLINICAL SUPPORT (OUTPATIENT)
Dept: REHABILITATION | Facility: HOSPITAL | Age: 58
End: 2018-09-27
Attending: ORTHOPAEDIC SURGERY
Payer: MEDICARE

## 2018-09-27 PROCEDURE — 97110 THERAPEUTIC EXERCISES: CPT | Mod: PN,KX

## 2018-09-27 NOTE — PROGRESS NOTES
TIME RECORD    Date:  09/27/2018    Start Time:  805  Stop Time:  845    PROCEDURES:    TIMED  Procedure Time Min.   Therapeutic exercise  Start:815  Stop:845 30    Start:  Stop:     Start:  Stop:     Start:  Stop:          UNTIMED  Procedure Time Min.   heat Start:805  Stop:815 10    Start:  Stop:      Total Timed Minutes:  30  Total Timed Units:  2  Total Untimed Units:  1  Charges Billed/# of units:  3      Progress/Current Status    Subjective:     Patient ID: Steff Johnson is a 58 y.o. female.  Diagnosis: s/p L shoulder rotator cuff repair  Pain: 0 /10  Pt reports no pain with activity or upon waking.    Objective:     Pt upgraded to red tband and performed all exercises of HEP with red tband during session.    Assessment:   ROM measured at beginning of session:  GH flexion 140*  GH abduction 113*  ROM measured at end of session:  GH flexion 145*  GH abduction 130*  Pt is competent to perform HEP without further assistance from OT.    Patient Education/Response:   Pt educated to keep performing HEP BID; especially in the morning to increase her ROM before her daily activity.  Pt understands she is being dc today and to continue HEP for strengthening and increased ROM.    Plans and Goals:     Pt is discharged from Occupational therapy and competent to perform HEP without further instruction. She has achieved WFL of all shoulder ROM.    SYLVIE Norris  I certify that I was present in the room directing the student in service delivery and guiding them using my skilled judgment. As the co-signing therapist I have reviewed the students documentation and am responsible for the treatment, assessment, and plan.     I certify that I was present in the room directing the student in service delivery and guiding them using my skilled judgment. As the co-signing therapist I have reviewed the students documentation and am responsible for the treatment, assessment, and plan.   SAHARA Ware

## 2018-10-12 ENCOUNTER — LAB VISIT (OUTPATIENT)
Dept: LAB | Facility: HOSPITAL | Age: 58
End: 2018-10-12
Attending: NURSE PRACTITIONER
Payer: MEDICARE

## 2018-10-12 DIAGNOSIS — E11.65 TYPE 2 DIABETES MELLITUS WITH HYPERGLYCEMIA, WITH LONG-TERM CURRENT USE OF INSULIN: ICD-10-CM

## 2018-10-12 DIAGNOSIS — Z79.4 TYPE 2 DIABETES MELLITUS WITH HYPERGLYCEMIA, WITH LONG-TERM CURRENT USE OF INSULIN: ICD-10-CM

## 2018-10-12 LAB
25(OH)D3+25(OH)D2 SERPL-MCNC: 12 NG/ML
ALBUMIN SERPL BCP-MCNC: 3.4 G/DL
ALP SERPL-CCNC: 114 U/L
ALT SERPL W/O P-5'-P-CCNC: 12 U/L
ANION GAP SERPL CALC-SCNC: 5 MMOL/L
AST SERPL-CCNC: 14 U/L
BILIRUB SERPL-MCNC: 0.6 MG/DL
BUN SERPL-MCNC: 19 MG/DL
CALCIUM SERPL-MCNC: 9.5 MG/DL
CHLORIDE SERPL-SCNC: 106 MMOL/L
CHOLEST SERPL-MCNC: 166 MG/DL
CHOLEST/HDLC SERPL: 2.5 {RATIO}
CO2 SERPL-SCNC: 26 MMOL/L
CREAT SERPL-MCNC: 1.1 MG/DL
EST. GFR  (AFRICAN AMERICAN): >60 ML/MIN/1.73 M^2
EST. GFR  (NON AFRICAN AMERICAN): 54.7 ML/MIN/1.73 M^2
ESTIMATED AVG GLUCOSE: 272 MG/DL
GLUCOSE SERPL-MCNC: 209 MG/DL
HBA1C MFR BLD HPLC: 11.1 %
HDLC SERPL-MCNC: 67 MG/DL
HDLC SERPL: 40.4 %
LDLC SERPL CALC-MCNC: 88.6 MG/DL
NONHDLC SERPL-MCNC: 99 MG/DL
POTASSIUM SERPL-SCNC: 4.1 MMOL/L
PROT SERPL-MCNC: 7.4 G/DL
SODIUM SERPL-SCNC: 137 MMOL/L
TRIGL SERPL-MCNC: 52 MG/DL
TSH SERPL DL<=0.005 MIU/L-ACNC: 1.61 UIU/ML

## 2018-10-12 PROCEDURE — 80053 COMPREHEN METABOLIC PANEL: CPT

## 2018-10-12 PROCEDURE — 36415 COLL VENOUS BLD VENIPUNCTURE: CPT | Mod: PO

## 2018-10-12 PROCEDURE — 83036 HEMOGLOBIN GLYCOSYLATED A1C: CPT

## 2018-10-12 PROCEDURE — 80061 LIPID PANEL: CPT

## 2018-10-12 PROCEDURE — 84443 ASSAY THYROID STIM HORMONE: CPT

## 2018-10-12 PROCEDURE — 82306 VITAMIN D 25 HYDROXY: CPT

## 2018-11-12 ENCOUNTER — OFFICE VISIT (OUTPATIENT)
Dept: ORTHOPEDICS | Facility: CLINIC | Age: 58
End: 2018-11-12
Payer: MEDICARE

## 2018-11-12 VITALS
HEART RATE: 70 BPM | WEIGHT: 139 LBS | BODY MASS INDEX: 28.02 KG/M2 | SYSTOLIC BLOOD PRESSURE: 212 MMHG | HEIGHT: 59 IN | DIASTOLIC BLOOD PRESSURE: 101 MMHG

## 2018-11-12 DIAGNOSIS — M75.122 COMPLETE TEAR OF LEFT ROTATOR CUFF: Primary | ICD-10-CM

## 2018-11-12 DIAGNOSIS — M75.101 ROTATOR CUFF SYNDROME OF RIGHT SHOULDER: ICD-10-CM

## 2018-11-12 PROCEDURE — 99213 OFFICE O/P EST LOW 20 MIN: CPT | Mod: 25,S$GLB,, | Performed by: ORTHOPAEDIC SURGERY

## 2018-11-12 PROCEDURE — 3008F BODY MASS INDEX DOCD: CPT | Mod: CPTII,S$GLB,, | Performed by: ORTHOPAEDIC SURGERY

## 2018-11-12 PROCEDURE — 20610 DRAIN/INJ JOINT/BURSA W/O US: CPT | Mod: RT,S$GLB,, | Performed by: ORTHOPAEDIC SURGERY

## 2018-11-12 PROCEDURE — 3077F SYST BP >= 140 MM HG: CPT | Mod: CPTII,S$GLB,, | Performed by: ORTHOPAEDIC SURGERY

## 2018-11-12 PROCEDURE — 3080F DIAST BP >= 90 MM HG: CPT | Mod: CPTII,S$GLB,, | Performed by: ORTHOPAEDIC SURGERY

## 2018-11-12 PROCEDURE — 99999 PR PBB SHADOW E&M-EST. PATIENT-LVL III: CPT | Mod: PBBFAC,,, | Performed by: ORTHOPAEDIC SURGERY

## 2018-11-12 RX ORDER — TRIAMCINOLONE ACETONIDE 40 MG/ML
40 INJECTION, SUSPENSION INTRA-ARTICULAR; INTRAMUSCULAR
Status: DISCONTINUED | OUTPATIENT
Start: 2018-11-12 | End: 2018-11-12 | Stop reason: HOSPADM

## 2018-11-12 RX ADMIN — TRIAMCINOLONE ACETONIDE 40 MG: 40 INJECTION, SUSPENSION INTRA-ARTICULAR; INTRAMUSCULAR at 09:11

## 2018-11-12 NOTE — PROGRESS NOTES
"  Past Medical History:   Diagnosis Date    Arthritis     COPD (chronic obstructive pulmonary disease)     Diabetes mellitus     Hypertension     Wears glasses        Past Surgical History:   Procedure Laterality Date    ARTHROSCOPY-SHOULDER WITH SUBACROMIAL DECOMPRESSION Left 3/16/2018    Performed by [facility]i M[facility]ina MD at [facility] OR    COLONOSCOPY N/A 4/11/2017    Procedure: COLONOSCOPY;  Surgeon: Jared Antonio MD;  Location: [facility] ENDO;  Service: Endoscopy;  Laterality: N/A;    COLONOSCOPY N/A 4/11/2017    Performed by Jared Antonio MD at [facility] ENDO    [id number]-[id number]-GNNKTC END Left 3/16/2018    Performed by [facility] at [facility] OR    HYSTERECTOMY      OOPHORECTOMY      REPAIR ROTATOR CUFF ARTHROSCOPIC Left 3/16/2018    Performed by [facility] at [facility] OR    SHOULDER SURGERY      R       Current Outpatient Medications   Medication Sig    amlodipine-valsartan (EXFORGE)  mg per tablet Take 1 tablet by mouth once daily.    atorvastatin (LIPITOR) 20 MG tablet Take 1 tablet (20 mg total) by mouth once daily.    BLOOD PRESSURE CUFF Misc 1 kit by Misc.(Non-Drug; Combo Route) route 3 (three) times daily.    BLOOD PRESSURE CUFF Misc 1 Units by Misc.(Non-Drug; Combo Route) route once daily.    blood sugar diagnostic (BLOOD GLUCOSE TEST) Strp 1 strip by Misc.(Non-Drug; Combo Route) route 4 (four) times daily.    blood sugar diagnostic Strp 1 strip four times daily before meals and bedtime. True Metrix or equivalent covered by insurance. Diagnosis: E11.65    blood-glucose meter kit 1 each by Other route 4 (four) times daily. Use as instructed    cloNIDine 0.1 mg/24 hr td ptwk (CATAPRES) 0.1 mg/24 hr Place 1 patch onto the skin every 7 days.    clotrimazole-betamethasone 1-0.05% (LOTRISONE) cream Apply topically 2 (two) times daily.    COMFORT EZ PEN NEEDLES 31 gauge x 3/16" Ndle 1 each by Misc.(Non-Drug; Combo Route) route 2 (two) times daily.    " "glimepiride (AMARYL) 2 MG tablet Take 1 tablet (2 mg total) by mouth before breakfast.    HYDROcodone-acetaminophen (NORCO)  mg per tablet Take 1 tablet by mouth every 6 (six) hours as needed for Pain.    insulin degludec 100 unit/mL (3 mL) InPn Inject 30 Units into the skin once daily.    lancets 33 gauge Misc 1 lancet by Misc.(Non-Drug; Combo Route) route 4 (four) times daily before meals and nightly. For True Metrix system. Diagnosis: E11.65    lidocaine-prilocaine (EMLA) cream as needed.     metoprolol succinate (TOPROL-XL) 200 MG 24 hr tablet Take 1 tablet (200 mg total) by mouth once daily.    pen needle, diabetic (PEN NEEDLE) 30 gauge x 5/16" Ndle Inject 2 x /day    spironolactone (ALDACTONE) 25 MG tablet Take 1 tablet (25 mg total) by mouth every morning.    tiotropium (SPIRIVA WITH HANDIHALER) 18 mcg inhalation capsule Inhale 1 capsule (18 mcg total) into the lungs once daily.    tiZANidine (ZANAFLEX) 4 MG tablet TAKE 1 TABLET(4 MG) BY MOUTH EVERY 6 HOURS AS NEEDED FOR MUSCLE SPASM    hydrALAZINE (APRESOLINE) 50 MG tablet Take 2 tablets (100 mg total) by mouth every 12 (twelve) hours. (Patient taking differently: Take 100 mg by mouth every 12 (twelve) hours. TAKES AM ONLY)     No current facility-administered medications for this visit.        Review of patient's allergies indicates:  No Known Allergies    Family History   Problem Relation Age of Onset    Diabetes Mother     Asthma Mother     Hypertension Mother     Diabetes Father     Diabetes Brother     Hypertension Brother     Anemia Daughter        Social History     Socioeconomic History    Marital status: Single     Spouse name: Not on file    Number of children: Not on file    Years of education: Not on file    Highest education level: Not on file   Social Needs    Financial resource strain: Not on file    Food insecurity - worry: Not on file    Food insecurity - inability: Not on file    Transportation needs - " medical: Not on file    Transportation needs - non-medical: Not on file   Occupational History     Comment: disabled due to shoulder problems   Tobacco Use    Smoking status: Never Smoker    Smokeless tobacco: Never Used   Substance and Sexual Activity    Alcohol use: No    Drug use: No    Sexual activity: Not Currently   Other Topics Concern    Not on file   Social History Narrative    Not on file       Chief Complaint:   Chief Complaint   Patient presents with    Post-op Evaluation     s/p left shoulder RC Repair 3/16/18        Date of surgery: March 16, 2018    History of present illness: 58-year-old female underwent left rotator cuff repair for a massive retracted rotator cuff tear.    Patient had a suture bridge technique performed.  We used suture tape.  Her left shoulder is actually doing better.  Complaining more right shoulder pain.  Has a history of prior right rotator cuff repair. She is no longer in physical therapy.  Pain in the right shoulder is an 8/10.  Keeping her up at night.    Review of Systems:    Musculoskeletal:  See HPI        Physical Examination:    Vital Signs:    Vitals:    11/12/18 0848   BP: (!) 212/101   Pulse: 70       Body mass index is 28.07 kg/m².    This a well-developed, well nourished patient in no acute distress.  They are alert and oriented and cooperative to examination.  Pt. walks without an antalgic gait.      Examination left shoulder shows healed surgical portals.  No erythema or drainage.  No bruising.  Much less stiff in irritable than previous visits.  Forward flexion of about 160°.  External rotation about 80°.    Examination of the right shoulder shows moderate impingement pain.  Forward flexion of 160° with external rotation of 80°.  Prior healed surgical scar noted.  Some swelling and tenderness near the AC joint.    X-rays: None     Assessment:: Status post suture bridge rotator cuff repair for massive tear  Right rotator cuff syndrome    Plan:  Continue  with the exercises on both shoulders.  Offered her a cortisone injection of the right shoulder.  She agreed.  Follow-up in 2 months.    This note was created using [employer] voice recognition software that occasionally misinterpreted phrases or words.

## 2018-11-12 NOTE — PROCEDURES
Large Joint Aspiration/Injection: R subacromial bursa  Date/Time: 11/12/2018 9:12 AM  Performed by: Messi Molina MD  Authorized by: Messi Molina MD     Consent Done?:  Yes (Verbal)  Indications:  Pain  Procedure site marked: Yes    Timeout: Prior to procedure the correct patient, procedure, and site was verified      Location:  Shoulder  Site:  R subacromial bursa  Prep: Patient was prepped and draped in usual sterile fashion    Ultrasonic Guidance for needle placement: No  Needle size:  20 G  Approach:  Posterior  Medications:  40 mg triamcinolone acetonide 40 mg/mL  Patient tolerance:  Patient tolerated the procedure well with no immediate complications

## 2018-11-17 ENCOUNTER — HOSPITAL ENCOUNTER (EMERGENCY)
Facility: HOSPITAL | Age: 58
Discharge: HOME | End: 2018-11-17
Attending: EMERGENCY MEDICINE
Payer: MEDICARE

## 2018-11-17 VITALS
WEIGHT: 135 LBS | HEIGHT: 59 IN | OXYGEN SATURATION: 97 % | HEART RATE: 81 BPM | SYSTOLIC BLOOD PRESSURE: 217 MMHG | DIASTOLIC BLOOD PRESSURE: 106 MMHG | RESPIRATION RATE: 16 BRPM | TEMPERATURE: 98 F | BODY MASS INDEX: 27.21 KG/M2
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
## 2018-11-17 DIAGNOSIS — S00.83XA FACIAL CONTUSION, INITIAL ENCOUNTER: Primary | ICD-10-CM

## 2018-11-17 PROCEDURE — 99285 EMERGENCY DEPT VISIT HI MDM: CPT | Mod: 25

## 2018-11-18 NOTE — ED PROVIDER NOTES
Encounter Date: 11/17/2018    SCRIBE #1 NOTE: I, Carisa Valerio , am scribing for, and in the presence of,  Dr. Dumont . I have scribed the entire note.       History     Chief Complaint   Patient presents with    Facial Injury     Tripped an hit lt side of face last night. Denies LOC.  Swelling noted to Lt side of face.  Denies Cervical pain     11/17/2018  9:31 PM       The patient is a 58 y.o. female with a PMHx of DM, HTN, COPD, and arthritis who is presenting with the acute onset of L sided facial swelling that began x 1 day s/p mechanical fall. The pt reports that she fell while walking up the stairs and landed on the L side of her face. She endorses associated swelling and bruising surrounding the L eye. She denies associated changes in vision, LOC, numbness, weakness, or facial pain. No other comments or complaints. No neck pain, back pain, or other injury. No pertinent past surgical hx.                 The history is provided by the patient and medical records.     Review of patient's allergies indicates:  No Known Allergies  Past Medical History:   Diagnosis Date    Arthritis     COPD (chronic obstructive pulmonary disease)     Diabetes mellitus     Hypertension     Wears glasses      Past Surgical History:   Procedure Laterality Date    ARTHROSCOPY-SHOULDER WITH SUBACROMIAL DECOMPRESSION Left 3/16/2018    [facility]    COLONOSCOPY N/A 4/11/2017    Procedure: COLONOSCOPY;  Surgeon: Jared Antonio MD;  Location: [facility];  Service: Endoscopy;  Laterality: N/A;    COLONOSCOPY N/A 4/11/2017    [facility] ENDO    [id number] END Left 3/16/2018    [facility]    HYSTERECTOMY      OOPHORECTOMY      REPAIR ROTATOR CUFF ARTHROSCOPIC Left 3/16/2018    [facility]    SHOULDER SURGERY      R     Family History   Problem Relation Age of Onset     Diabetes Mother     Asthma Mother     Hypertension Mother     Diabetes Father     Diabetes Brother     Hypertension Brother     Anemia Daughter      Social History     Tobacco Use    Smoking status: Never Smoker    Smokeless tobacco: Never Used   Substance Use Topics    Alcohol use: No    Drug use: No     Review of Systems   Constitutional: Negative for fever.   HENT: Positive for facial swelling. Negative for sore throat.         +brusing    Respiratory: Negative for shortness of breath.    Cardiovascular: Negative for chest pain and leg swelling.   Gastrointestinal: Negative for nausea.   Genitourinary: Negative for dysuria.   Musculoskeletal: Negative for back pain and neck pain.   Skin: Positive for color change. Negative for rash.   Neurological: Negative for weakness.   Hematological: Does not bruise/bleed easily.       Physical Exam     Initial Vitals [11/17/18 2114]   BP Pulse Resp Temp SpO2   (!) 217/106 81 16 98.2 °F (36.8 °C) 97 %      MAP       --         Physical Exam    Nursing note and vitals reviewed.  Constitutional: She appears well-developed and well-nourished. She is cooperative.  Non-toxic appearance.   HENT:   Head: Head is without Millan's sign and without abrasion.   Right Ear: Hearing and tympanic membrane normal. No hemotympanum.   Left Ear: Hearing and tympanic membrane normal. No hemotympanum.   Nose: Nose normal.   Mouth/Throat: Oropharynx is clear and moist.   L sided periorbital ecchymosis and swelling present.     Eyes: EOM are normal. Pupils are equal, round, and reactive to light. Right conjunctiva has no hemorrhage. Left conjunctiva has no hemorrhage.   Neck: Normal range of motion. Neck supple.   Cardiovascular: Normal rate, regular rhythm, S1 normal, S2 normal, normal heart sounds, intact distal pulses and normal pulses. Exam reveals no gallop.    No murmur heard.  Pulmonary/Chest: Breath sounds normal. No stridor. She has no wheezes. She has no rhonchi. She has no  rales.   Abdominal: Soft. Normal appearance and bowel sounds are normal. There is no tenderness.   Musculoskeletal: Normal range of motion. She exhibits no edema or tenderness.   No C spine, T spine, or L spine TTP.   Lymphadenopathy:     She has no cervical adenopathy.   Neurological: She is alert and oriented to person, place, and time. She has normal strength.   CN III through XII intact.          ED Course   Procedures  Labs Reviewed - No data to display       Imaging Results          CT Orbits Sella Post Fossa Without Cont (Final result)  Result time 11/18/18 07:55:07    Final result by Garret Reynaga MD (11/18/18 07:55:07)                 Impression:      1. There is superior left cheek, mild left periorbital, forehead and inferior frontal scalp soft tissue swelling/contusion without acute maxillofacial or orbital fracture.  There is no acute ocular injury.  2. Left maxillary medial incisor periodontal disease.  3. Other non acute findings are described above.  Note: Preliminary results were provided by Dr. Parra ([facility]).  There is no significant discrepancy.      Electronically signed by: Garret Reynaga MD  Date:    11/18/2018  Time:    07:55             Narrative:    EXAMINATION:  CT ORBITS SELLA POST FOSSA WITHOUT CONT    CLINICAL HISTORY:  fall. L periorbital swelling and ttp;    TECHNIQUE:  Unenhanced axial images were obtained through the maxillofacial region.  Sagittal and coronal reformatted images were created.  The study is reviewed in bone and soft tissue windows.    COMPARISON:  Head CT obtained concurrently    FINDINGS:  As seen on comparison head CT, there is mild superior left cheek, periorbital, left forehead and inferior frontal scalp soft tissue swelling with contusion but without underlying fracture.  The left frontal skull is intact.  There is no orbital fracture.  There is no acute displaced or depressed nasal bone fracture.  The globes are intact.  The retrobulbar soft tissues  are normal.    Incidentally noted is elongation and ossification of the stylohyoid ligament bilaterally.  The entire length of this process is not included on the study.  This can be an incidental finding but can also be seen in the setting of [place] syndrome.  Correlate clinically.    There is left maxillary medial incisor periodontal disease.    The paranasal sinuses and mastoid air cells are clear.  It should be noted that the petrous apex is pneumatized bilaterally.  This represents normal developmental variation.                               CT Head Without Contrast (Final result)  Result time 11/18/18 07:50:04    Final result by Garret Reynaga MD (11/18/18 07:50:04)                 Impression:      1. There is left periorbital and frontal scalp soft tissue swelling without underlying fracture.  2. There is no acute intracranial abnormality.  There is nonspecific white matter change which likely reflect sequelae of chronic small vessel disease.  There may be tiny chronic lacunar infarctions in the basal ganglia versus prominent perivascular spaces.  There is no intracranial hemorrhage, mass or obvious acute infarction.  Note: Preliminary results were provided by Dr. Parra ([facility]).  There is no significant discrepancy.      Electronically signed by: Garret Reynaga MD  Date:    11/18/2018  Time:    07:50             Narrative:    EXAMINATION:  CT HEAD WITHOUT CONTRAST    CLINICAL HISTORY:  fall;    TECHNIQUE:  Routine unenhanced axial images were obtained through the head.  Sagittal and coronal reformatted images were created.  The study is reviewed in bone and soft tissue windows.    COMPARISON:  None    FINDINGS:  Intracranial contents: There is no acute intracranial abnormality.  Brain volume is normal for age.  There is mild-to-moderate nonspecific periventricular and scattered subcortical white matter decreased attenuation.  The white matter findings likely reflect sequelae of chronic small vessel  disease.  Small well-defined hypodensities are present in the medial basal ganglia bilaterally.  These could represent tiny chronic lacunar infarctions versus prominent perivascular spaces.  There is no intracranial hemorrhage or mass effect.  The gray-white interface is preserved without obvious acute infarction.  There is no abnormal extra-axial fluid collection.  The basilar cisterns are open.  The cerebellar tonsils are in normal position.  The sellar structures are normal.    Extracranial contents, calvarium, soft tissues: There is mild left periorbital and left frontal scalp soft tissue swelling.  There is no underlying fracture.  The calvarium and left orbit are intact but will be further evaluated on dedicated maxillofacial CT obtained concurrently.  The included paranasal sinuses and mastoid air cells are clear.                                 Medical Decision Making:   History:   Old Medical Records: I decided to obtain old medical records.  Clinical Tests:   Radiological Study: Ordered and Reviewed            Scribe Attestation:   Scribe #1: I performed the above scribed service and the documentation accurately describes the services I performed. I attest to the accuracy of the note.            ED Course as of Nov 19 1023   Sat Nov 17, 2018   2240 58-year-old female past medical history of diabetes, hypertension presents today after a mechanical fall yesterday for standing.  Negative LOC.  Patient reports that she feels well but her granddaughter's made her come in today.  Denies dizziness, blurry vision, nausea, vomiting or difficulty walking or any other symptoms at this time.  Patient has some left periorbital swelling and tenderness. Pupils equal round reactive bilaterally. EOM intact. Will get CT head and CT orbits.  [BD]   2242 CT head negative.  [BD]   2256 CT orbits pending  [BD]   2258 Signed out to Dr. Welch pending CT orbit.   [BD]      ED Course User Index  [BD] Gary Dumont MD      Clinical Impression:   The primary encounter diagnosis was Facial contusion, initial encounter. A diagnosis of Fall on same level from stumbling, initial encounter was also pertinent to this visit.         CT orbits negative. DC home with PCP fu and return precautions    Attending Attestation:     Physician Attestation for Scribe:    I, Dr. Gary Dumont, personally performed the services described in this documentation.   All medical record entries made by the scribe were at my direction and in my presence.   I have reviewed the chart and agree that the record is accurate and complete.   Gary Dumont MD  10:23 AM 11/19/2018                  Gary Dumont MD  11/19/18 1024

## 2018-12-07 ENCOUNTER — DOCUMENTATION ONLY (OUTPATIENT)
Dept: FAMILY MEDICINE | Facility: CLINIC | Age: 58
End: 2018-12-07

## 2018-12-07 ENCOUNTER — LAB VISIT (OUTPATIENT)
Dept: LAB | Facility: HOSPITAL | Age: 58
End: 2018-12-07
Attending: INTERNAL MEDICINE
Payer: MEDICARE

## 2018-12-07 DIAGNOSIS — R80.9 PROTEINURIA, UNSPECIFIED TYPE: ICD-10-CM

## 2018-12-07 LAB
ALBUMIN SERPL BCP-MCNC: 3.1 G/DL
ANION GAP SERPL CALC-SCNC: 8 MMOL/L
BUN SERPL-MCNC: 12 MG/DL
CALCIUM SERPL-MCNC: 9.6 MG/DL
CHLORIDE SERPL-SCNC: 100 MMOL/L
CO2 SERPL-SCNC: 29 MMOL/L
CREAT SERPL-MCNC: 1 MG/DL
EST. GFR  (AFRICAN AMERICAN): >60 ML/MIN/1.73 M^2
EST. GFR  (NON AFRICAN AMERICAN): >60 ML/MIN/1.73 M^2
GLUCOSE SERPL-MCNC: 258 MG/DL
PHOSPHATE SERPL-MCNC: 3.1 MG/DL
POTASSIUM SERPL-SCNC: 4.1 MMOL/L
SODIUM SERPL-SCNC: 137 MMOL/L

## 2018-12-07 PROCEDURE — 80069 RENAL FUNCTION PANEL: CPT

## 2018-12-07 PROCEDURE — 36415 COLL VENOUS BLD VENIPUNCTURE: CPT | Mod: PO

## 2018-12-07 NOTE — PROGRESS NOTES
Pre-Visit Chart Review  For Appointment Scheduled on 12/07/2018    Health Maintenance Due   Topic Date Due    TETANUS VACCINE  01/08/1978    Zoster Vaccine  01/08/2018

## 2018-12-17 ENCOUNTER — TELEPHONE (OUTPATIENT)
Dept: NEPHROLOGY | Facility: CLINIC | Age: 58
End: 2018-12-17

## 2018-12-17 ENCOUNTER — OFFICE VISIT (OUTPATIENT)
Dept: NEPHROLOGY | Facility: CLINIC | Age: 58
End: 2018-12-17
Payer: MEDICARE

## 2018-12-17 VITALS
BODY MASS INDEX: 27.73 KG/M2 | OXYGEN SATURATION: 98 % | SYSTOLIC BLOOD PRESSURE: 204 MMHG | HEART RATE: 59 BPM | WEIGHT: 137.56 LBS | HEIGHT: 59 IN | DIASTOLIC BLOOD PRESSURE: 104 MMHG

## 2018-12-17 DIAGNOSIS — I15.2 HYPERTENSION ASSOCIATED WITH DIABETES: ICD-10-CM

## 2018-12-17 DIAGNOSIS — E78.2 MIXED HYPERLIPIDEMIA: ICD-10-CM

## 2018-12-17 DIAGNOSIS — E11.65 TYPE 2 DIABETES MELLITUS WITH HYPERGLYCEMIA, WITH LONG-TERM CURRENT USE OF INSULIN: ICD-10-CM

## 2018-12-17 DIAGNOSIS — J44.9 CHRONIC OBSTRUCTIVE PULMONARY DISEASE, UNSPECIFIED COPD TYPE: ICD-10-CM

## 2018-12-17 DIAGNOSIS — E11.59 HYPERTENSION ASSOCIATED WITH DIABETES: ICD-10-CM

## 2018-12-17 DIAGNOSIS — I10 UNCONTROLLED HYPERTENSION: ICD-10-CM

## 2018-12-17 DIAGNOSIS — Z79.4 TYPE 2 DIABETES MELLITUS WITH HYPERGLYCEMIA, WITH LONG-TERM CURRENT USE OF INSULIN: ICD-10-CM

## 2018-12-17 DIAGNOSIS — R80.9 PROTEINURIA, UNSPECIFIED TYPE: Primary | ICD-10-CM

## 2018-12-17 DIAGNOSIS — E11.21 TYPE 2 DIABETES MELLITUS WITH DIABETIC NEPHROPATHY, WITH LONG-TERM CURRENT USE OF INSULIN: ICD-10-CM

## 2018-12-17 DIAGNOSIS — Z79.4 TYPE 2 DIABETES MELLITUS WITH DIABETIC NEPHROPATHY, WITH LONG-TERM CURRENT USE OF INSULIN: ICD-10-CM

## 2018-12-17 PROCEDURE — 99999 PR PBB SHADOW E&M-EST. PATIENT-LVL III: CPT | Mod: PBBFAC,,, | Performed by: INTERNAL MEDICINE

## 2018-12-17 PROCEDURE — 3046F HEMOGLOBIN A1C LEVEL >9.0%: CPT | Mod: CPTII,S$GLB,, | Performed by: INTERNAL MEDICINE

## 2018-12-17 PROCEDURE — 3008F BODY MASS INDEX DOCD: CPT | Mod: CPTII,S$GLB,, | Performed by: INTERNAL MEDICINE

## 2018-12-17 PROCEDURE — 3077F SYST BP >= 140 MM HG: CPT | Mod: CPTII,S$GLB,, | Performed by: INTERNAL MEDICINE

## 2018-12-17 PROCEDURE — 3080F DIAST BP >= 90 MM HG: CPT | Mod: CPTII,S$GLB,, | Performed by: INTERNAL MEDICINE

## 2018-12-17 PROCEDURE — 99213 OFFICE O/P EST LOW 20 MIN: CPT | Mod: S$GLB,,, | Performed by: INTERNAL MEDICINE

## 2018-12-17 RX ORDER — METOPROLOL SUCCINATE 100 MG/1
100 TABLET, EXTENDED RELEASE ORAL DAILY
Qty: 90 TABLET | Refills: 3
Start: 2018-12-17 | End: 2019-10-17 | Stop reason: SDUPTHER

## 2018-12-17 RX ORDER — CLONIDINE 0.2 MG/24H
1 PATCH, EXTENDED RELEASE TRANSDERMAL
Qty: 4 PATCH | Refills: 11 | Status: SHIPPED | OUTPATIENT
Start: 2018-12-17 | End: 2019-03-26

## 2018-12-17 NOTE — TELEPHONE ENCOUNTER
Patient has appointment with [facility] in [place] on Jan 2.  Can we ask them to check her pressure manually or does she need a nurse appointment in this clinic for 2 week BP check?

## 2018-12-17 NOTE — TELEPHONE ENCOUNTER
Both. Her BP is uncontrolled and needs to be checked as often as possible. Does she have transportation troubles? Does she know a nurse who can check her BP or can borrow a BP machine?

## 2018-12-18 NOTE — TELEPHONE ENCOUNTER
I spoke with the patient.  She has transportation but it is hard for her to come to [place].  She agreed to check her pressure at the local pharmacy at least every 2-3 days and keep an log that includes where she checked, when, BP and pulse.  I will call her in 2 weeks if I haven't heard from her for a report.  Reminder entered.

## 2018-12-23 NOTE — PROGRESS NOTES
Subjective:       Patient ID: Steff Johnson is a 58 y.o. Black or  female who presents for new evaluation of Proteinuria and Hypertension    j      Hypertension   Pertinent negatives include no chest pain, headaches or shortness of breath.   She is referred by her PCP for proteinuria.   She has a significant history of DM and HTN, last Hba1c was 8.5 which improved from 11 previously. She denies foamy urine, no hematuria and no frequent UTIs. She follows a low sodium diet but admits she does not ensure hydration (dislikes water) Occasional NSAID use but no herbal medications. She was a premature infant. She received PRBCs before 1972. No known kidney disease in her family    Interval history: She reports she is feeling well. No foamy urine. Last Hba1c was 9.6, down from 10.3. No LE edema and no SOB    Interval history Dec 2018: She denies HTN symptoms to include CP, HA, SOB, and blurry vision. No LE edema. She was unable to provide a urine specimen     Review of Systems   Constitutional: Negative for activity change, appetite change, fatigue and unexpected weight change.   HENT: Negative for facial swelling.    Respiratory: Negative for shortness of breath.    Cardiovascular: Negative for chest pain and leg swelling.   Gastrointestinal: Positive for constipation. Negative for diarrhea.   Genitourinary: Negative for difficulty urinating, dysuria, flank pain and hematuria.   Musculoskeletal: Positive for arthralgias and back pain.   Neurological: Negative for weakness and headaches.       Objective:      Physical Exam   Constitutional: She is oriented to person, place, and time. She appears well-nourished. No distress.   HENT:   Mouth/Throat: Oropharynx is clear and moist.   Neck: No JVD present.   Cardiovascular: Regular rhythm, S1 normal and S2 normal. Exam reveals no friction rub.   Pulmonary/Chest: Breath sounds normal. She has no wheezes. She has no rales.   Abdominal: Soft.   Musculoskeletal:  She exhibits no edema.   Neurological: She is alert and oriented to person, place, and time.   Skin: Skin is warm and dry.   Psychiatric: She has a normal mood and affect.   Vitals reviewed.      Assessment:       1. Proteinuria, unspecified type    2. Uncontrolled hypertension    3. Hypertension associated with diabetes    4. Type 2 diabetes mellitus with diabetic nephropathy, with long-term current use of insulin    5. Type 2 diabetes mellitus with hyperglycemia, with long-term current use of insulin    6. Mixed hyperlipidemia    7. Chronic obstructive pulmonary disease, unspecified COPD type        Plan:           DM nephropathy. Continue RAAS blockade (valsartan) for renal preservation. Proteinuria--standing order placed for ease of collection    HTN--decrease Toprol to 100mg and increase clonidine patch to 0.2mg. BP log in one week        RTC after BP log  Also needs upc once BP better controlled

## 2019-01-02 ENCOUNTER — LAB VISIT (OUTPATIENT)
Dept: LAB | Facility: HOSPITAL | Age: 59
End: 2019-01-02
Attending: NURSE PRACTITIONER
Payer: MEDICARE

## 2019-01-02 ENCOUNTER — OFFICE VISIT (OUTPATIENT)
Dept: ENDOCRINOLOGY | Facility: CLINIC | Age: 59
End: 2019-01-02
Payer: MEDICARE

## 2019-01-02 VITALS
DIASTOLIC BLOOD PRESSURE: 90 MMHG | WEIGHT: 137.38 LBS | TEMPERATURE: 98 F | BODY MASS INDEX: 27.69 KG/M2 | RESPIRATION RATE: 16 BRPM | HEIGHT: 59 IN | SYSTOLIC BLOOD PRESSURE: 198 MMHG | HEART RATE: 71 BPM

## 2019-01-02 DIAGNOSIS — Z79.4 TYPE 2 DIABETES MELLITUS WITH DIABETIC NEPHROPATHY, WITH LONG-TERM CURRENT USE OF INSULIN: ICD-10-CM

## 2019-01-02 DIAGNOSIS — J44.9 CHRONIC OBSTRUCTIVE PULMONARY DISEASE, UNSPECIFIED COPD TYPE: ICD-10-CM

## 2019-01-02 DIAGNOSIS — E11.65 TYPE 2 DIABETES MELLITUS WITH HYPERGLYCEMIA, WITH LONG-TERM CURRENT USE OF INSULIN: Primary | ICD-10-CM

## 2019-01-02 DIAGNOSIS — E78.2 MIXED HYPERLIPIDEMIA: ICD-10-CM

## 2019-01-02 DIAGNOSIS — E11.59 HYPERTENSION ASSOCIATED WITH DIABETES: ICD-10-CM

## 2019-01-02 DIAGNOSIS — Z79.4 TYPE 2 DIABETES MELLITUS WITH HYPERGLYCEMIA, WITH LONG-TERM CURRENT USE OF INSULIN: Primary | ICD-10-CM

## 2019-01-02 DIAGNOSIS — E11.65 TYPE 2 DIABETES MELLITUS WITH HYPERGLYCEMIA, WITH LONG-TERM CURRENT USE OF INSULIN: ICD-10-CM

## 2019-01-02 DIAGNOSIS — I15.2 HYPERTENSION ASSOCIATED WITH DIABETES: ICD-10-CM

## 2019-01-02 DIAGNOSIS — E11.21 TYPE 2 DIABETES MELLITUS WITH DIABETIC NEPHROPATHY, WITH LONG-TERM CURRENT USE OF INSULIN: ICD-10-CM

## 2019-01-02 DIAGNOSIS — E55.9 VITAMIN D DEFICIENCY: ICD-10-CM

## 2019-01-02 DIAGNOSIS — Z79.4 TYPE 2 DIABETES MELLITUS WITH HYPERGLYCEMIA, WITH LONG-TERM CURRENT USE OF INSULIN: ICD-10-CM

## 2019-01-02 LAB
ESTIMATED AVG GLUCOSE: 269 MG/DL
HBA1C MFR BLD HPLC: 11 %

## 2019-01-02 PROCEDURE — 36415 COLL VENOUS BLD VENIPUNCTURE: CPT | Mod: PO

## 2019-01-02 PROCEDURE — 99999 PR PBB SHADOW E&M-EST. PATIENT-LVL IV: ICD-10-PCS | Mod: PBBFAC,,, | Performed by: NURSE PRACTITIONER

## 2019-01-02 PROCEDURE — 99999 PR PBB SHADOW E&M-EST. PATIENT-LVL IV: CPT | Mod: PBBFAC,,, | Performed by: NURSE PRACTITIONER

## 2019-01-02 PROCEDURE — 99214 PR OFFICE/OUTPT VISIT, EST, LEVL IV, 30-39 MIN: ICD-10-PCS | Mod: S$GLB,,, | Performed by: NURSE PRACTITIONER

## 2019-01-02 PROCEDURE — 99214 OFFICE O/P EST MOD 30 MIN: CPT | Mod: S$GLB,,, | Performed by: NURSE PRACTITIONER

## 2019-01-02 PROCEDURE — 83036 HEMOGLOBIN GLYCOSYLATED A1C: CPT

## 2019-01-02 RX ORDER — NEBULIZER AND COMPRESSOR
1 EACH MISCELLANEOUS 3 TIMES DAILY
Qty: 1 EACH | Refills: 0 | Status: SHIPPED | OUTPATIENT
Start: 2019-01-02 | End: 2019-12-12

## 2019-01-02 RX ORDER — GLIMEPIRIDE 4 MG/1
4 TABLET ORAL
Qty: 90 TABLET | Refills: 3 | Status: SHIPPED | OUTPATIENT
Start: 2019-01-02 | End: 2019-08-07

## 2019-01-02 RX ORDER — INSULIN DEGLUDEC 100 U/ML
30 INJECTION, SOLUTION SUBCUTANEOUS DAILY
Qty: 10 SYRINGE | Refills: 3 | Status: SHIPPED | OUTPATIENT
Start: 2019-01-02 | End: 2019-08-07

## 2019-01-02 RX ORDER — INSULIN DEGLUDEC 100 U/ML
40 INJECTION, SOLUTION SUBCUTANEOUS DAILY
Qty: 10 SYRINGE | Refills: 3 | Status: SHIPPED | OUTPATIENT
Start: 2019-01-02 | End: 2019-01-02

## 2019-01-02 RX ORDER — LANCETS
EACH MISCELLANEOUS
Qty: 200 EACH | Refills: 3 | Status: SHIPPED | OUTPATIENT
Start: 2019-01-02 | End: 2022-11-09 | Stop reason: SDUPTHER

## 2019-01-02 RX ORDER — INSULIN PUMP SYRINGE, 3 ML
EACH MISCELLANEOUS
Qty: 1 EACH | Refills: 0 | Status: SHIPPED | OUTPATIENT
Start: 2019-01-02 | End: 2019-12-12 | Stop reason: DRUGHIGH

## 2019-01-02 NOTE — PROGRESS NOTES
CC: This 58 y.o. female presents for management of diabetes  and chronic conditions pending review including HTN, HLP, nephropathy     HPI: She was diagnosed with T2DM in . Has never been hospitalized r/t DM.  Family hx of DM: mom, dad, brothers, and sister     Arrives 30 mins late for appt  + Missing doses of DM medication.   hypoglycemia at home- yes fasting- unclear as to when this last occurred     monitoring BG at home: No log or meter again  Fastin-90s  Dinner: 160s    Diet: Eats 3 meals a day, snacks- PB and jelly- sandwich w milk   Exercise: none  Dm education x1 at diagnosis     CURRENT DM MEDS: tresiba 60 u qhs    Vial/pen:  Uses pens  Glucometer type:  True track?     Standards of Care:  Eye exam: 2018 + DM retinopathy, + cataracts       Follows w Dr Ruthann barclay nephrology     Retired from [employer] (worked for them for 20 yrs)     BP uncontrolled, just took bp meds    ROS:   Gen: Appetite good, + weight loss 4 lbs, denies fatigue and weakness.  Skin: Skin is intact and heals well, no rashes, no hair changes  Eyes: Denies visual disturbances  Resp: no SOB or SEGUNDO, no cough  Cardiac: No palpitations, chest pain, no edema   GI: No nausea or vomiting, diarrhea, constipation, or abdominal pain.  /GYN: 3-4+ nocturia, burning or pain.   MS/Neuro: Denies numbness/ tingling in BLE; Gait steady, speech clear  Psych: Denies drug/ETOH abuse, no hx of depression.  Other systems: negative.    PE:  GENERAL: Well developed, well nourished.  PSYCH: AAOx3, appropriate mood and affect, pleasant expression, conversant, appears relaxed, well groomed.   EYES: Conjunctiva, corneas clear  NECK: Supple, trachea midline  NEURO: Gait steady  SKIN: Skin warm and dry no acanthosis nigracans.  FOOT EXAMINATION: 2018  No foot deformity, corns or callus formation,  nails in good condition and well trimmed, no interspace maceration or ulceration noted.  Decreased hair growth present over  toes/feet.  Positive vibratory response to 128 Hz tuning fork bilaterally.  Protective sensation intact with 10 gram monofilament.  +2 dorsalis pedis and posterior pulses noted.      Lab Results   Component Value Date    MICALBCREAT 12.2 08/15/2017       Hemoglobin A1C   Date Value Ref Range Status   10/12/2018 11.1 (H) 4.0 - 5.6 % Final     Comment:     ADA Screening Guidelines:  5.7-6.4%  Consistent with prediabetes  >or=6.5%  Consistent with diabetes  High levels of fetal hemoglobin interfere with the HbA1C  assay. Heterozygous hemoglobin variants (HbS, HgC, etc)do  not significantly interfere with this assay.   However, presence of multiple variants may affect accuracy.     04/24/2018 9.6 (H) 4.0 - 5.6 % Final     Comment:     According to ADA guidelines, hemoglobin A1c <7.0% represents  optimal control in non-pregnant diabetic patients. Different  metrics may apply to specific patient populations.   Standards of Medical Care in Diabetes-2016.  For the purpose of screening for the presence of diabetes:  <5.7%     Consistent with the absence of diabetes  5.7-6.4%  Consistent with increasing risk for diabetes   (prediabetes)  >or=6.5%  Consistent with diabetes  Currently, no consensus exists for use of hemoglobin A1c  for diagnosis of diabetes for children.  This Hemoglobin A1c assay has significant interference with fetal   hemoglobin   (HbF). The results are invalid for patients with abnormal amounts of   HbF,   including those with known Hereditary Persistence   of Fetal Hemoglobin. Heterozygous hemoglobin variants (HbAS, HbAC,   HbAD, HbAE, HbA2) do not significantly interfere with this assay;   however, presence of multiple variants in a sample may impact the %   interference.     01/31/2018 10.3 (H) 4.0 - 5.6 % Final     Comment:     According to ADA guidelines, hemoglobin A1c <7.0% represents  optimal control in non-pregnant diabetic patients. Different  metrics may apply to specific patient populations.    Standards of Medical Care in Diabetes-2016.  For the purpose of screening for the presence of diabetes:  <5.7%     Consistent with the absence of diabetes  5.7-6.4%  Consistent with increasing risk for diabetes   (prediabetes)  >or=6.5%  Consistent with diabetes  Currently, no consensus exists for use of hemoglobin A1c  for diagnosis of diabetes for children.  This Hemoglobin A1c assay has significant interference with fetal   hemoglobin   (HbF). The results are invalid for patients with abnormal amounts of   HbF,   including those with known Hereditary Persistence   of Fetal Hemoglobin. Heterozygous hemoglobin variants (HbAS, HbAC,   HbAD, HbAE, HbA2) do not significantly interfere with this assay;   however, presence of multiple variants in a sample may impact the %   interference.          ASSESSMENT and PLAN:    1. T2DM with hyperglycemia, DM nephropathy   in office  Long discussion with patient on need to take meds as prescribed, check bg and bring logs to visit  Long discussion on increased risk of stoke and MI with continued uncontrolled BG and BP!  Decrease tresiba to 30 u qhs, Start glimeperide 4 mg qam,   Check bg bid- log in 2 weeks or sooner for issues  Discussed A1c and BG goals.   - takes  Arb, statin    2. HTN - uncontrolled, continue meds as previously prescribed and monitor.     3. HLP - on statin therapy, LFTs WNL    4. COPD- No current steroids    5.  Dm nephropathy follows ning [place]     Follow-up: in 3 months with lab prior

## 2019-01-08 ENCOUNTER — TELEPHONE (OUTPATIENT)
Dept: FAMILY MEDICINE | Facility: CLINIC | Age: 59
End: 2019-01-08

## 2019-01-08 NOTE — TELEPHONE ENCOUNTER
----- Message from Gracia Clinton sent at 1/8/2019  9:01 AM CST -----  Contact: [facility] [phone]   Patient needs a new order for her test strips and lancets. She will also need medical necessity and office notes. Please fax to [phone].  Thank you!

## 2019-01-11 NOTE — TELEPHONE ENCOUNTER
----- Message from Edie Merino sent at 1/11/2019 12:53 PM CST -----  Contact: Patient  Type: Needs Medical Advice    Who Called:  Patient  Pharmacy name and phone #:    [facility] [id number] - [address] & [address] [address]  Phone: [phone] Fax: [phone]    Best Call Back Number: [phone]  Additional Information: Patient is requesting a Rx for her blood sugar strips - [employer]    Please call to advise  Thank you

## 2019-01-14 ENCOUNTER — OFFICE VISIT (OUTPATIENT)
Dept: ORTHOPEDICS | Facility: CLINIC | Age: 59
End: 2019-01-14
Payer: MEDICARE

## 2019-01-14 VITALS
HEIGHT: 59 IN | HEART RATE: 56 BPM | DIASTOLIC BLOOD PRESSURE: 100 MMHG | SYSTOLIC BLOOD PRESSURE: 221 MMHG | BODY MASS INDEX: 27.62 KG/M2 | WEIGHT: 137 LBS

## 2019-01-14 DIAGNOSIS — M25.612 SHOULDER STIFFNESS, LEFT: ICD-10-CM

## 2019-01-14 DIAGNOSIS — M75.122 COMPLETE TEAR OF LEFT ROTATOR CUFF: Primary | ICD-10-CM

## 2019-01-14 PROCEDURE — 99999 PR PBB SHADOW E&M-EST. PATIENT-LVL III: ICD-10-PCS | Mod: PBBFAC,,, | Performed by: ORTHOPAEDIC SURGERY

## 2019-01-14 PROCEDURE — 3008F BODY MASS INDEX DOCD: CPT | Mod: CPTII,S$GLB,, | Performed by: ORTHOPAEDIC SURGERY

## 2019-01-14 PROCEDURE — 3077F PR MOST RECENT SYSTOLIC BLOOD PRESSURE >= 140 MM HG: ICD-10-PCS | Mod: CPTII,S$GLB,, | Performed by: ORTHOPAEDIC SURGERY

## 2019-01-14 PROCEDURE — 99213 PR OFFICE/OUTPT VISIT, EST, LEVL III, 20-29 MIN: ICD-10-PCS | Mod: S$GLB,,, | Performed by: ORTHOPAEDIC SURGERY

## 2019-01-14 PROCEDURE — 3080F PR MOST RECENT DIASTOLIC BLOOD PRESSURE >= 90 MM HG: ICD-10-PCS | Mod: CPTII,S$GLB,, | Performed by: ORTHOPAEDIC SURGERY

## 2019-01-14 PROCEDURE — 3008F PR BODY MASS INDEX (BMI) DOCUMENTED: ICD-10-PCS | Mod: CPTII,S$GLB,, | Performed by: ORTHOPAEDIC SURGERY

## 2019-01-14 PROCEDURE — 99213 OFFICE O/P EST LOW 20 MIN: CPT | Mod: S$GLB,,, | Performed by: ORTHOPAEDIC SURGERY

## 2019-01-14 PROCEDURE — 99999 PR PBB SHADOW E&M-EST. PATIENT-LVL III: CPT | Mod: PBBFAC,,, | Performed by: ORTHOPAEDIC SURGERY

## 2019-01-14 PROCEDURE — 3080F DIAST BP >= 90 MM HG: CPT | Mod: CPTII,S$GLB,, | Performed by: ORTHOPAEDIC SURGERY

## 2019-01-14 PROCEDURE — 3077F SYST BP >= 140 MM HG: CPT | Mod: CPTII,S$GLB,, | Performed by: ORTHOPAEDIC SURGERY

## 2019-01-14 NOTE — PROGRESS NOTES
"  Past Medical History:   Diagnosis Date    Arthritis     COPD (chronic obstructive pulmonary disease)     Diabetes mellitus     Hypertension     Wears glasses        Past Surgical History:   Procedure Laterality Date    ARTHROSCOPY-SHOULDER WITH SUBACROMIAL DECOMPRESSION Left 3/16/2018    [facility] OR    COLONOSCOPY N/A 4/11/2017    Performed by Jared Antonio [facility] ENDO    [id number]-[id number]-IVBADU END Left 3/16/2018    [facility] OR    HYSTERECTOMY      OOPHORECTOMY      REPAIR ROTATOR CUFF ARTHROSCOPIC Left 3/16/2018    [facility] OR    SHOULDER SURGERY      R       Current Outpatient Medications   Medication Sig    amlodipine-valsartan (EXFORGE)  mg per tablet Take 1 tablet by mouth once daily.    atorvastatin (LIPITOR) 20 MG tablet Take 1 tablet (20 mg total) by mouth once daily.    BLOOD PRESSURE CUFF Misc 1 kit by Misc.(Non-Drug; Combo Route) route 3 (three) times daily.    blood sugar diagnostic Strp To check BG 2 times daily, to use with insurance preferred meter    blood-glucose meter kit To check BG 2 times daily, to use with insurance preferred meter    cloNIDine 0.2 mg/24 hr td ptwk (CATAPRES) 0.2 mg/24 hr Place 1 patch onto the skin every 7 days.    clotrimazole-betamethasone 1-0.05% (LOTRISONE) cream Apply topically 2 (two) times daily.    COMFORT EZ PEN NEEDLES 31 gauge x 3/16" Ndle 1 each by Misc.(Non-Drug; Combo Route) route 2 (two) times daily.    glimepiride (AMARYL) 4 MG tablet Take 1 tablet (4 mg total) by mouth before breakfast.    HYDROcodone-acetaminophen (NORCO)  mg per tablet Take 1 tablet by mouth every 6 (six) hours as needed for Pain.    insulin degludec (TRESIBA FLEXTOUCH U-100) 100 unit/mL (3 mL) InPn Inject 30 Units into the skin once daily.    lancets Misc To check BG 2 times daily, to use with insurance preferred meter    " "metoprolol succinate (TOPROL-XL) 100 MG 24 hr tablet Take 1 tablet (100 mg total) by mouth once daily.    pen needle, diabetic (PEN NEEDLE) 30 gauge x 5/16" Ndle Inject 2 x /day    tiotropium (SPIRIVA WITH HANDIHALER) 18 mcg inhalation capsule Inhale 1 capsule (18 mcg total) into the lungs once daily.    tiZANidine (ZANAFLEX) 4 MG tablet TAKE 1 TABLET(4 MG) BY MOUTH EVERY 6 HOURS AS NEEDED FOR MUSCLE SPASM    spironolactone (ALDACTONE) 25 MG tablet Take 1 tablet (25 mg total) by mouth every morning.     No current facility-administered medications for this visit.        Review of patient's allergies indicates:  No Known Allergies    Family History   Problem Relation Age of Onset    Diabetes Mother     Asthma Mother     Hypertension Mother     Diabetes Father     Diabetes Brother     Hypertension Brother     Anemia Daughter        Social History     Socioeconomic History    Marital status: Single     Spouse name: Not on file    Number of children: Not on file    Years of education: Not on file    Highest education level: Not on file   Social Needs    Financial resource strain: Not on file    Food insecurity - worry: Not on file    Food insecurity - inability: Not on file    Transportation needs - medical: Not on file    Transportation needs - non-medical: Not on file   Occupational History     Comment: disabled due to shoulder problems   Tobacco Use    Smoking status: Never Smoker    Smokeless tobacco: Never Used   Substance and Sexual Activity    Alcohol use: No    Drug use: No    Sexual activity: Not Currently   Other Topics Concern    Not on file   Social History Narrative    Not on file       Chief Complaint:   Chief Complaint   Patient presents with    Right Shoulder - Pain    Shoulder Pain     L shoulder 2mo f/u       Date of surgery: March 16, 2018    History of present illness: 58-year-old female underwent left rotator cuff repair for a massive retracted rotator cuff tear.    " Patient had a suture bridge technique performed.  We used suture tape.  Her left shoulder is actually doing better.   Has a history of prior right rotator cuff repair. She is no longer in physical therapy.  Pain in the right shoulder is an 8/10.  Keeping her up at night.  Injection did help.    Review of Systems:    Musculoskeletal:  See HPI        Physical Examination:    Vital Signs:    Vitals:    01/14/19 0809   BP: (!) 221/100   Pulse: (!) 56       Body mass index is 27.67 kg/m².    This a well-developed, well nourished patient in no acute distress.  They are alert and oriented and cooperative to examination.  Pt. walks without an antalgic gait.      Examination left shoulder shows healed surgical portals.  No erythema or drainage.  No bruising.  Much less stiff in irritable than previous visits.  Forward flexion of about 160°.  External rotation about 80°.    Examination of the right shoulder shows moderate impingement pain.  Forward flexion of 160° with external rotation of 80°.  Prior healed surgical scar noted.  Some swelling and tenderness near the AC joint with a mild cyst    X-rays: None     Assessment:: Status post suture bridge rotator cuff repair for massive tear  Right rotator cuff syndrome    Plan:  Continue with the exercises on both shoulders.  Talked about repeat injections periodically.  Follow-up in a couple months.    This note was created using M*Modal voice recognition software that occasionally misinterpreted phrases or words.

## 2019-01-15 ENCOUNTER — TELEPHONE (OUTPATIENT)
Dept: NEPHROLOGY | Facility: CLINIC | Age: 59
End: 2019-01-15

## 2019-01-15 NOTE — TELEPHONE ENCOUNTER
"Spoke with patient who states "it still be high".  I asked if she was writing it down and she said she was.  I asked her if she could get it to us or read them to me.  She said she would have to call me right back.    "

## 2019-01-16 NOTE — TELEPHONE ENCOUNTER
----- Message from Kenneth Silver sent at 1/16/2019  3:32 PM CST -----  Type:  Patient Returning Call    Who Called:  Patient  Who Left Message for Patient:  Luiza  Does the patient know what this is regarding?:  No  Best Call Back Number:  [phone] (home)

## 2019-01-16 NOTE — TELEPHONE ENCOUNTER
I spoke with the patient and she said her machine doesn't act right.  She said she didn't know what she would do because she doesn't think people's health will pay for one.  I told her perhaps we could send one out and try.     I called  and printed out a med necessity form.  We need to fill that out and return it.  I will place it in MD box.

## 2019-01-21 RX ORDER — ACETAMINOPHEN 500 MG
1 TABLET ORAL 3 TIMES DAILY
Qty: 1 EACH | Refills: 0 | Status: SHIPPED | OUTPATIENT
Start: 2019-01-21 | End: 2019-12-12 | Stop reason: DRUGHIGH

## 2019-03-18 ENCOUNTER — OFFICE VISIT (OUTPATIENT)
Dept: ORTHOPEDICS | Facility: CLINIC | Age: 59
End: 2019-03-18
Payer: MEDICARE

## 2019-03-18 VITALS
HEIGHT: 59 IN | HEART RATE: 68 BPM | DIASTOLIC BLOOD PRESSURE: 107 MMHG | SYSTOLIC BLOOD PRESSURE: 226 MMHG | BODY MASS INDEX: 27.62 KG/M2 | WEIGHT: 137 LBS

## 2019-03-18 DIAGNOSIS — M75.122 COMPLETE TEAR OF LEFT ROTATOR CUFF: Primary | ICD-10-CM

## 2019-03-18 DIAGNOSIS — M25.612 SHOULDER STIFFNESS, LEFT: ICD-10-CM

## 2019-03-18 PROCEDURE — 99213 OFFICE O/P EST LOW 20 MIN: CPT | Mod: S$GLB,,, | Performed by: ORTHOPAEDIC SURGERY

## 2019-03-18 PROCEDURE — 3080F DIAST BP >= 90 MM HG: CPT | Mod: CPTII,S$GLB,, | Performed by: ORTHOPAEDIC SURGERY

## 2019-03-18 PROCEDURE — 3077F PR MOST RECENT SYSTOLIC BLOOD PRESSURE >= 140 MM HG: ICD-10-PCS | Mod: CPTII,S$GLB,, | Performed by: ORTHOPAEDIC SURGERY

## 2019-03-18 PROCEDURE — 99999 PR PBB SHADOW E&M-EST. PATIENT-LVL III: ICD-10-PCS | Mod: PBBFAC,,, | Performed by: ORTHOPAEDIC SURGERY

## 2019-03-18 PROCEDURE — 99999 PR PBB SHADOW E&M-EST. PATIENT-LVL III: CPT | Mod: PBBFAC,,, | Performed by: ORTHOPAEDIC SURGERY

## 2019-03-18 PROCEDURE — 3008F BODY MASS INDEX DOCD: CPT | Mod: CPTII,S$GLB,, | Performed by: ORTHOPAEDIC SURGERY

## 2019-03-18 PROCEDURE — 99213 PR OFFICE/OUTPT VISIT, EST, LEVL III, 20-29 MIN: ICD-10-PCS | Mod: S$GLB,,, | Performed by: ORTHOPAEDIC SURGERY

## 2019-03-18 PROCEDURE — 3077F SYST BP >= 140 MM HG: CPT | Mod: CPTII,S$GLB,, | Performed by: ORTHOPAEDIC SURGERY

## 2019-03-18 PROCEDURE — 3080F PR MOST RECENT DIASTOLIC BLOOD PRESSURE >= 90 MM HG: ICD-10-PCS | Mod: CPTII,S$GLB,, | Performed by: ORTHOPAEDIC SURGERY

## 2019-03-18 PROCEDURE — 3008F PR BODY MASS INDEX (BMI) DOCUMENTED: ICD-10-PCS | Mod: CPTII,S$GLB,, | Performed by: ORTHOPAEDIC SURGERY

## 2019-03-18 NOTE — PROGRESS NOTES
"  Past Medical History:   Diagnosis Date    Arthritis     COPD (chronic obstructive pulmonary disease)     Diabetes mellitus     Hypertension     Wears glasses        Past Surgical History:   Procedure Laterality Date    ARTHROSCOPY-SHOULDER WITH SUBACROMIAL DECOMPRESSION Left 3/16/2018    [facility] OR    COLONOSCOPY N/A 4/11/2017    [facility]formed by Jared [facility] ENDO    KNLWCLVH-HADRSCYI-YNKPSS END Left 3/16/2018    [facility] OR    HYSTERECTOMY      OOPHORECTOMY      REPAIR ROTATOR CUFF ARTHROSCOPIC Left 3/16/2018    [facility] OR    SHOULDER SURGERY      R       Current Outpatient Medications   Medication Sig    amlodipine-valsartan (EXFORGE)  mg per tablet Take 1 tablet by mouth once daily.    atorvastatin (LIPITOR) 20 MG tablet Take 1 tablet (20 mg total) by mouth once daily.    BLOOD PRESSURE CUFF Misc 1 kit by Misc.(Non-Drug; Combo Route) route 3 (three) times daily.    blood pressure monitor (BLOOD PRESSURE KIT) Kit 1 kit by Misc.(Non-Drug; Combo Route) route 3 (three) times daily.    blood sugar diagnostic Strp To check BG 2 times daily, to use with insurance preferred meter    blood-glucose meter kit To check BG 2 times daily, to use with insurance preferred meter    clotrimazole-betamethasone 1-0.05% (LOTRISONE) cream Apply topically 2 (two) times daily.    COMFORT EZ PEN NEEDLES 31 gauge x 3/16" Ndle 1 each by Misc.(Non-Drug; Combo Route) route 2 (two) times daily.    glimepiride (AMARYL) 4 MG tablet Take 1 tablet (4 mg total) by mouth before breakfast.    HYDROcodone-acetaminophen (NORCO)  mg per tablet Take 1 tablet by mouth every 6 (six) hours as needed for Pain.    insulin degludec (TRESIBA FLEXTOUCH U-100) 100 unit/mL (3 mL) InPn Inject 30 Units into the skin once daily.    lancets [facility] To check BG 2 times daily, to use with insurance preferred " "meter    metoprolol succinate (TOPROL-XL) 100 MG 24 hr tablet Take 1 tablet (100 mg total) by mouth once daily.    pen needle, diabetic (PEN NEEDLE) 30 gauge x 5/16" Ndle Inject 2 x /day    tiotropium (SPIRIVA WITH HANDIHALER) 18 mcg inhalation capsule Inhale 1 capsule (18 mcg total) into the lungs once daily.    tiZANidine (ZANAFLEX) 4 MG tablet TAKE 1 TABLET(4 MG) BY MOUTH EVERY 6 HOURS AS NEEDED FOR MUSCLE SPASM    cloNIDine 0.2 mg/24 hr td ptwk (CATAPRES) 0.2 mg/24 hr Place 1 patch onto the skin every 7 days.    spironolactone (ALDACTONE) 25 MG tablet Take 1 tablet (25 mg total) by mouth every morning.     No current facility-administered medications for this visit.        Review of patient's allergies indicates:  No Known Allergies    Family History   Problem Relation Age of Onset    Diabetes Mother     Asthma Mother     Hypertension Mother     Diabetes Father     Diabetes Brother     Hypertension Brother     Anemia Daughter        Social History     Socioeconomic History    Marital status: Single     Spouse name: Not on file    Number of children: Not on file    Years of education: Not on file    Highest education level: Not on file   Social Needs    Financial resource strain: Not on file    Food insecurity - worry: Not on file    Food insecurity - inability: Not on file    Transportation needs - medical: Not on file    Transportation needs - non-medical: Not on file   Occupational History     Comment: disabled due to shoulder problems   Tobacco Use    Smoking status: Never Smoker    Smokeless tobacco: Never Used   Substance and Sexual Activity    Alcohol use: No    Drug use: No    Sexual activity: Not Currently   Other Topics Concern    Not on file   Social History Narrative    Not on file       Chief Complaint:   Chief Complaint   Patient presents with    Shoulder Pain     bilateral shoulder pain follow up        Date of surgery: March 16, 2018    History of present illness: " 59-year-old female underwent left rotator cuff repair for a massive retracted rotator cuff tear.    Patient had a suture bridge technique performed.  We used suture tape.  Her left shoulder is actually doing better.   Has a history of prior right rotator cuff repair. She is no longer in physical therapy.  Left shoulder is hurting her a little more than the right.  Pain at the end of the day.  Has a little tightness in both of her shoulders.    Review of Systems:    Musculoskeletal:  See HPI        Physical Examination:    Vital Signs:    Vitals:    03/18/19 0816   BP: (!) 226/107   Pulse: 68       Body mass index is 27.67 kg/m².    This a well-developed, well nourished patient in no acute distress.  They are alert and oriented and cooperative to examination.  Pt. walks without an antalgic gait.      Examination left shoulder shows healed surgical portals.  No erythema or drainage.  No bruising.  Just some very minimal tightness at the end of range of motion. No irritability.  Forward flexion of about 160°.  External rotation about 80°.    Examination of the right shoulder shows moderate impingement pain.  Forward flexion of 160° with external rotation of 80°.  Prior healed surgical scar noted.  Some swelling and tenderness near the AC joint with a mild cyst    X-rays: None     Assessment:: Status post left suture bridge rotator cuff repair for massive tear  Bilateral rotator cuff syndrome    Plan:  Continue with the exercises on both shoulders.  Gave her a prescription of pain cream with some anti-inflammatories to hopefully help with stiffness and irritability.  Follow-up in a couple months.  Recommended that she see her primary care doctor about her chronic cough as well as hypertension.    This note was created using M*Modal voice recognition software that occasionally misinterpreted phrases or words.

## 2019-03-19 ENCOUNTER — OFFICE VISIT (OUTPATIENT)
Dept: FAMILY MEDICINE | Facility: CLINIC | Age: 59
End: 2019-03-19
Payer: MEDICARE

## 2019-03-19 VITALS
OXYGEN SATURATION: 97 % | SYSTOLIC BLOOD PRESSURE: 181 MMHG | HEIGHT: 59 IN | TEMPERATURE: 98 F | DIASTOLIC BLOOD PRESSURE: 79 MMHG | WEIGHT: 135.56 LBS | HEART RATE: 66 BPM | BODY MASS INDEX: 27.33 KG/M2

## 2019-03-19 DIAGNOSIS — J01.40 ACUTE PANSINUSITIS, RECURRENCE NOT SPECIFIED: Primary | ICD-10-CM

## 2019-03-19 DIAGNOSIS — J30.9 ALLERGIC RHINITIS, UNSPECIFIED SEASONALITY, UNSPECIFIED TRIGGER: ICD-10-CM

## 2019-03-19 DIAGNOSIS — I15.2 HYPERTENSION ASSOCIATED WITH DIABETES: ICD-10-CM

## 2019-03-19 DIAGNOSIS — E11.59 HYPERTENSION ASSOCIATED WITH DIABETES: ICD-10-CM

## 2019-03-19 PROCEDURE — 99212 PR OFFICE/OUTPT VISIT, EST, LEVL II, 10-19 MIN: ICD-10-PCS | Mod: S$GLB,,, | Performed by: NURSE PRACTITIONER

## 2019-03-19 PROCEDURE — 3077F SYST BP >= 140 MM HG: CPT | Mod: CPTII,S$GLB,, | Performed by: NURSE PRACTITIONER

## 2019-03-19 PROCEDURE — 99999 PR PBB SHADOW E&M-EST. PATIENT-LVL III: CPT | Mod: PBBFAC,,, | Performed by: NURSE PRACTITIONER

## 2019-03-19 PROCEDURE — 99212 OFFICE O/P EST SF 10 MIN: CPT | Mod: S$GLB,,, | Performed by: NURSE PRACTITIONER

## 2019-03-19 PROCEDURE — 3008F PR BODY MASS INDEX (BMI) DOCUMENTED: ICD-10-PCS | Mod: CPTII,S$GLB,, | Performed by: NURSE PRACTITIONER

## 2019-03-19 PROCEDURE — 3046F PR MOST RECENT HEMOGLOBIN A1C LEVEL > 9.0%: ICD-10-PCS | Mod: CPTII,S$GLB,, | Performed by: NURSE PRACTITIONER

## 2019-03-19 PROCEDURE — 3078F DIAST BP <80 MM HG: CPT | Mod: CPTII,S$GLB,, | Performed by: NURSE PRACTITIONER

## 2019-03-19 PROCEDURE — 99999 PR PBB SHADOW E&M-EST. PATIENT-LVL III: ICD-10-PCS | Mod: PBBFAC,,, | Performed by: NURSE PRACTITIONER

## 2019-03-19 PROCEDURE — 3077F PR MOST RECENT SYSTOLIC BLOOD PRESSURE >= 140 MM HG: ICD-10-PCS | Mod: CPTII,S$GLB,, | Performed by: NURSE PRACTITIONER

## 2019-03-19 PROCEDURE — 3078F PR MOST RECENT DIASTOLIC BLOOD PRESSURE < 80 MM HG: ICD-10-PCS | Mod: CPTII,S$GLB,, | Performed by: NURSE PRACTITIONER

## 2019-03-19 PROCEDURE — 3008F BODY MASS INDEX DOCD: CPT | Mod: CPTII,S$GLB,, | Performed by: NURSE PRACTITIONER

## 2019-03-19 PROCEDURE — 3046F HEMOGLOBIN A1C LEVEL >9.0%: CPT | Mod: CPTII,S$GLB,, | Performed by: NURSE PRACTITIONER

## 2019-03-19 RX ORDER — DOXYCYCLINE HYCLATE 100 MG
100 TABLET ORAL 2 TIMES DAILY
Qty: 14 TABLET | Refills: 0 | Status: SHIPPED | OUTPATIENT
Start: 2019-03-19 | End: 2019-03-26

## 2019-03-19 RX ORDER — FLUTICASONE PROPIONATE 50 MCG
1 SPRAY, SUSPENSION (ML) NASAL DAILY
Qty: 1 BOTTLE | Refills: 3 | Status: ON HOLD | OUTPATIENT
Start: 2019-03-19 | End: 2019-12-14 | Stop reason: HOSPADM

## 2019-03-19 NOTE — PROGRESS NOTES
Subjective:       Patient ID: Steff Johnson is a 59 y.o. female.    Chief Complaint: Cough (yellow mucus)    BP uncontrolled. Has seen nephrology for this. Last visit in December 2018. She checks BP at home, yesterday 223/103. She says she is complaint with her medication. Asymptomatic. She has been taking mucinex for her symptoms. Denies recent use of decongestants.       Cough   This is a new problem. The current episode started 1 to 4 weeks ago. The problem has been gradually worsening. The problem occurs constantly. The cough is productive of purulent sputum. Associated symptoms include nasal congestion, rhinorrhea and wheezing. Pertinent negatives include no chest pain, chills, fever, headaches, heartburn or shortness of breath. Nothing aggravates the symptoms. She has tried OTC cough suppressant for the symptoms. The treatment provided mild relief.     Patient Active Problem List   Diagnosis    Hypertension associated with diabetes    COPD (chronic obstructive pulmonary disease)    Type 2 diabetes mellitus with hyperglycemia, with long-term current use of insulin    Proteinuria    Complete tear of left rotator cuff    Left shoulder pain    Shoulder stiffness, left    Shoulder weakness    Type 2 diabetes mellitus with diabetic nephropathy    Mixed hyperlipidemia     Review of Systems   Constitutional: Negative for chills, fatigue and fever.   HENT: Positive for congestion, rhinorrhea, sinus pressure and sinus pain.         Ear itching    Eyes: Negative for itching.   Respiratory: Positive for cough and wheezing. Negative for shortness of breath.    Cardiovascular: Negative for chest pain and palpitations.   Gastrointestinal: Negative for heartburn.   Neurological: Negative for headaches.       Objective:      Physical Exam   Constitutional: She is oriented to person, place, and time. She appears well-developed and well-nourished.   HENT:   Right Ear: Tympanic membrane, external ear and ear canal  normal.   Left Ear: Tympanic membrane, external ear and ear canal normal.   Nose: Mucosal edema and rhinorrhea present. Right sinus exhibits maxillary sinus tenderness. Right sinus exhibits no frontal sinus tenderness. Left sinus exhibits maxillary sinus tenderness. Left sinus exhibits no frontal sinus tenderness.   Mouth/Throat: Oropharynx is clear and moist.   Cardiovascular: Normal rate, regular rhythm and normal heart sounds.   Pulmonary/Chest: Effort normal and breath sounds normal. She has no wheezes.   Musculoskeletal: She exhibits no edema.   Neurological: She is alert and oriented to person, place, and time.   Skin: Skin is warm and dry.   Psychiatric: She has a normal mood and affect.   Nursing note and vitals reviewed.      Assessment:       1. Acute pansinusitis, recurrence not specified    2. Hypertension associated with diabetes    3. Allergic rhinitis, unspecified seasonality, unspecified trigger        Plan:       Steff was seen today for cough.    Diagnoses and all orders for this visit:    Acute pansinusitis, recurrence not specified  -     doxycycline (VIBRA-TABS) 100 MG tablet; Take 1 tablet (100 mg total) by mouth 2 (two) times daily. for 7 days  Take antibiotics with food. Increase fluids.    Hypertension associated with diabetes  -     NURSING COMMUNICATION: Create [facility] Account  -     Hypertension Digital Medicine (HDMP) Enrollment Order  -     Hypertension Digital Medicine ([facility]): Assign Onboarding Questionnaires  Uncontrolled, patient says she is complaint with her medications  Patient interested in digital HTN.  Does not have [facility] account, offered to help with this. She declined, will set up later   BP recheck in 1 week, if remains elevated will consider medication adjustments. May need to see nephrology again.    Allergic rhinitis, unspecified seasonality, unspecified trigger  -     fluticasone (FLONASE) 50 mcg/actuation nasal spray; 1 spray (50 mcg total) by Each Nare route  once daily.    Patient readiness: acceptance and barriers:none    During the course of the visit the patient was educated and counseled about the following:     Hypertension:   Check blood pressures daily and record.  Follow up: 1 week and as needed.    Goals: Hypertension: Reduce Blood Pressure    Did patient meet goals/outcomes: No    The following self management tools provided: blood pressure log    Patient Instructions (the written plan) was given to the patient/family.     Time spent with patient: 15 minutes    Barriers to medications present (no )    Adverse reactions to current medications (no)    Over the counter medications reviewed (Yes)    F/U PRN or if symptoms fail to improve

## 2019-03-26 ENCOUNTER — OFFICE VISIT (OUTPATIENT)
Dept: FAMILY MEDICINE | Facility: CLINIC | Age: 59
End: 2019-03-26
Payer: MEDICARE

## 2019-03-26 VITALS
DIASTOLIC BLOOD PRESSURE: 71 MMHG | HEART RATE: 55 BPM | RESPIRATION RATE: 12 BRPM | OXYGEN SATURATION: 99 % | BODY MASS INDEX: 27.87 KG/M2 | TEMPERATURE: 98 F | SYSTOLIC BLOOD PRESSURE: 145 MMHG | HEIGHT: 59 IN | WEIGHT: 138.25 LBS

## 2019-03-26 DIAGNOSIS — M25.511 BILATERAL SHOULDER PAIN, UNSPECIFIED CHRONICITY: ICD-10-CM

## 2019-03-26 DIAGNOSIS — E11.21 TYPE 2 DIABETES MELLITUS WITH DIABETIC NEPHROPATHY, WITH LONG-TERM CURRENT USE OF INSULIN: ICD-10-CM

## 2019-03-26 DIAGNOSIS — M25.512 BILATERAL SHOULDER PAIN, UNSPECIFIED CHRONICITY: ICD-10-CM

## 2019-03-26 DIAGNOSIS — F11.20 CONTINUOUS OPIOID DEPENDENCE: ICD-10-CM

## 2019-03-26 DIAGNOSIS — E11.59 HYPERTENSION ASSOCIATED WITH DIABETES: Primary | ICD-10-CM

## 2019-03-26 DIAGNOSIS — Z79.4 TYPE 2 DIABETES MELLITUS WITH DIABETIC NEPHROPATHY, WITH LONG-TERM CURRENT USE OF INSULIN: ICD-10-CM

## 2019-03-26 DIAGNOSIS — J44.9 CHRONIC OBSTRUCTIVE PULMONARY DISEASE, UNSPECIFIED COPD TYPE: ICD-10-CM

## 2019-03-26 DIAGNOSIS — E78.2 MIXED HYPERLIPIDEMIA: ICD-10-CM

## 2019-03-26 DIAGNOSIS — I15.2 HYPERTENSION ASSOCIATED WITH DIABETES: Primary | ICD-10-CM

## 2019-03-26 PROCEDURE — 99499 UNLISTED E&M SERVICE: CPT | Mod: S$GLB,,, | Performed by: FAMILY MEDICINE

## 2019-03-26 PROCEDURE — 3078F DIAST BP <80 MM HG: CPT | Mod: CPTII,S$GLB,, | Performed by: FAMILY MEDICINE

## 2019-03-26 PROCEDURE — 3077F PR MOST RECENT SYSTOLIC BLOOD PRESSURE >= 140 MM HG: ICD-10-PCS | Mod: CPTII,S$GLB,, | Performed by: FAMILY MEDICINE

## 2019-03-26 PROCEDURE — 99214 OFFICE O/P EST MOD 30 MIN: CPT | Mod: S$GLB,,, | Performed by: FAMILY MEDICINE

## 2019-03-26 PROCEDURE — 3078F PR MOST RECENT DIASTOLIC BLOOD PRESSURE < 80 MM HG: ICD-10-PCS | Mod: CPTII,S$GLB,, | Performed by: FAMILY MEDICINE

## 2019-03-26 PROCEDURE — 3008F BODY MASS INDEX DOCD: CPT | Mod: CPTII,S$GLB,, | Performed by: FAMILY MEDICINE

## 2019-03-26 PROCEDURE — 3046F PR MOST RECENT HEMOGLOBIN A1C LEVEL > 9.0%: ICD-10-PCS | Mod: CPTII,S$GLB,, | Performed by: FAMILY MEDICINE

## 2019-03-26 PROCEDURE — 99214 PR OFFICE/OUTPT VISIT, EST, LEVL IV, 30-39 MIN: ICD-10-PCS | Mod: S$GLB,,, | Performed by: FAMILY MEDICINE

## 2019-03-26 PROCEDURE — 3077F SYST BP >= 140 MM HG: CPT | Mod: CPTII,S$GLB,, | Performed by: FAMILY MEDICINE

## 2019-03-26 PROCEDURE — 99999 PR PBB SHADOW E&M-EST. PATIENT-LVL IV: ICD-10-PCS | Mod: PBBFAC,,, | Performed by: FAMILY MEDICINE

## 2019-03-26 PROCEDURE — 3008F PR BODY MASS INDEX (BMI) DOCUMENTED: ICD-10-PCS | Mod: CPTII,S$GLB,, | Performed by: FAMILY MEDICINE

## 2019-03-26 PROCEDURE — 99999 PR PBB SHADOW E&M-EST. PATIENT-LVL IV: CPT | Mod: PBBFAC,,, | Performed by: FAMILY MEDICINE

## 2019-03-26 PROCEDURE — 99499 RISK ADDL DX/OHS AUDIT: ICD-10-PCS | Mod: S$GLB,,, | Performed by: FAMILY MEDICINE

## 2019-03-26 PROCEDURE — 3046F HEMOGLOBIN A1C LEVEL >9.0%: CPT | Mod: CPTII,S$GLB,, | Performed by: FAMILY MEDICINE

## 2019-03-26 RX ORDER — DICLOFENAC SODIUM 10 MG/G
2 GEL TOPICAL 4 TIMES DAILY
Qty: 100 G | Status: SHIPPED | OUTPATIENT
Start: 2019-03-26 | End: 2019-12-12

## 2019-03-26 RX ORDER — CLONIDINE 0.3 MG/24H
1 PATCH, EXTENDED RELEASE TRANSDERMAL
Qty: 4 PATCH | Refills: 11 | Status: SHIPPED | OUTPATIENT
Start: 2019-03-26 | End: 2021-02-11 | Stop reason: SDUPTHER

## 2019-03-26 RX ORDER — TIOTROPIUM BROMIDE 18 UG/1
18 CAPSULE ORAL; RESPIRATORY (INHALATION) DAILY
Qty: 90 CAPSULE | Refills: 1 | Status: SHIPPED | OUTPATIENT
Start: 2019-03-26 | End: 2019-08-07 | Stop reason: SDUPTHER

## 2019-03-26 NOTE — PROGRESS NOTES
Subjective:       Patient ID: Steff Johnson is a 59 y.o. female.    Chief Complaint: Follow-up (6mth f/u hypertension / diabetes)    HPI  Review of Systems   Constitutional: Negative for fatigue and unexpected weight change.   Respiratory: Negative for chest tightness and shortness of breath.    Cardiovascular: Negative for chest pain, palpitations and leg swelling.   Gastrointestinal: Negative for abdominal pain.   Musculoskeletal: Positive for arthralgias.   Neurological: Negative for dizziness, syncope, light-headedness and headaches.       Patient Active Problem List   Diagnosis    Hypertension associated with diabetes    COPD (chronic obstructive pulmonary disease)    Type 2 diabetes mellitus with hyperglycemia, with long-term current use of insulin    Proteinuria    Complete tear of left rotator cuff    Left shoulder pain    Shoulder stiffness, left    Shoulder weakness    Type 2 diabetes mellitus with diabetic nephropathy    Mixed hyperlipidemia    Continuous opioid dependence     Patient is here for a chronic conditions follow up.    Type 2 DM- high 169.  No lows below 70. Fasting labs tomorrow with f/u endocrine 4/3/19    marley shoulder pain s/p surgery both Dr. Page with some relief. Now in pain mgmt taking norco 10 tid-Dr. Costello. Dps checked -no evidence of diversion. Pain 7/10 today  Objective:      Physical Exam   Constitutional: She is oriented to person, place, and time. She appears well-developed and well-nourished.   Cardiovascular: Normal rate, regular rhythm and normal heart sounds.   Pulmonary/Chest: Effort normal and breath sounds normal.   Musculoskeletal: She exhibits no edema.   Neurological: She is alert and oriented to person, place, and time.   Skin: Skin is warm and dry.   Psychiatric: She has a normal mood and affect.   Nursing note and vitals reviewed.      Assessment:       1. Hypertension associated with diabetes    2. Chronic obstructive pulmonary disease,  unspecified COPD type    3. Bilateral shoulder pain, unspecified chronicity    4. Mixed hyperlipidemia    5. Type 2 diabetes mellitus with diabetic nephropathy, with long-term current use of insulin    6. Continuous opioid dependence        Plan:        1. Chronic obstructive pulmonary disease, unspecified COPD type  Controlled on current medications.  Continue current medications.  Resolving recent excaerbation-finish doxycycline  - tiotropium (SPIRIVA WITH HANDIHALER) 18 mcg inhalation capsule; Inhale 1 capsule (18 mcg total) into the lungs once daily.  Dispense: 90 capsule; Refill: 1    2. Hypertension associated with diabetes  Much improved. Increase to get to goal < 140/90  - cloNIDine 0.3 mg/24 hr td ptwk (CATAPRES) 0.3 mg/24 hr; Place 1 patch onto the skin every 7 days.  Dispense: 4 patch; Refill: 11    3. Bilateral shoulder pain, unspecified chronicity  Cont ortho care and pain mgmt. Add  - diclofenac sodium (VOLTAREN) 1 % Gel; Apply 2 g topically 4 (four) times daily.  Dispense: 100 g; Refill: prn    4. Mixed hyperlipidemia  Controlled on current medications.  Continue current medications.      5. Type 2 diabetes mellitus with diabetic nephropathy, with long-term current use of insulin  Stable condition.  Continue current medications.  Will adjust based on lab findings or if condition changes.  F/u endocrine next week    6. Continuous opioid dependence  Cont pain mgmt    Time spent with patient: 20 minutes    Patient with be reevaluated in 6 months or sooner prn. Nurse BP check 4 weeks    Greater than 50% of this visit was spent counseling as described in above documentation:Yes

## 2019-03-26 NOTE — PATIENT INSTRUCTIONS
Established High Blood Pressure    High blood pressure (hypertension) is a chronic disease. Often, healthcare providers dont know what causes it. But it can be caused by certain health conditions and medicines.  If you have high blood pressure, you may not have any symptoms. If you do have symptoms, they may include headache, dizziness, changes in your vision, chest pain, and shortness of breath. But even without symptoms, high blood pressure thats not treated raises your risk for heart attack and stroke. High blood pressure is a serious health risk and shouldnt be ignored.  A blood pressure reading is made up of two numbers: a higher number over a lower number. The top number is the systolic pressure. The bottom number is the diastolic pressure. A normal blood pressure is a systolic pressure of  less than 120 over a diastolic pressure of less than 80. You will see your blood pressure readings written together. For example, a person with a systolic pressure of 188 and a diastolic pressure of 78 will have 118/78 written in the medical record.  High blood pressure is when either the top number is 140 or higher, or the bottom number is 90 or higher. This must be the result when taking your blood pressure a number of times. The blood pressures between normal and high are called prehypertension.  Home care  If you have high blood pressure, you should do what is listed below to lower your blood pressure. If you are taking medicines for high blood pressure, these methods may reduce or end your need for medicines in the future.  · Begin a weight-loss program if you are overweight.  · Cut back on how much salt you get in your diet. Heres how to do this:  ¨ Dont eat foods that have a lot of salt. These include olives, pickles, smoked meats, and salted potato chips.  ¨ Dont add salt to your food at the table.  ¨ Use only small amounts of salt when cooking.  · Start an exercise program. Talk with your healthcare  provider about the type of exercise program that would be best for you. It doesn't have to be hard. Even brisk walking for 20 minutes 3 times a week is a good form of exercise.  · Dont take medicines that stimulate the heart. This includes many over-the-counter cold and sinus decongestant pills and sprays, as well as diet pills. Check the warnings about hypertension on the label. Before buying any over-the-counter medicines or supplements, always ask the pharmacist about the product's potential interaction with your high blood pressure and your high blood pressure medicines.  · Stimulants such as amphetamine or cocaine could be deadly for someone with high blood pressure. Never take these.  · Limit how much caffeine you get in your diet. Switch to caffeine-free products.  · Stop smoking. If you are a long-time smoker, this can be hard. Talk to your healthcare provider about medicines and nicotine replacement options to help you. Also, enroll in a stop-smoking program to make it more likely that you will quit for good.  · Learn how to handle stress. This is an important part of any program to lower blood pressure. Learn about relaxation methods like meditation, yoga, or biofeedback.  · If your provider prescribed medicines, take them exactly as directed. Missing doses may cause your blood pressure get out of control.  · If you miss a dose or doses, check with your healthcare provider or pharmacist about what to do.  · Consider buying an automatic blood pressure machine. Ask your provider for a recommendation. You can get one of these at most pharmacies.     The American Heart Association recommends the following guidelines for home blood pressure monitoring:  · Don't smoke or drink coffee for 30 minutes before taking your blood pressure.  · Go to the bathroom before the test.  · Relax for 5 minutes before taking the measurement.  · Sit with your back supported (don't sit on a couch or soft chair); keep your feet on  the floor uncrossed. Place your arm on a solid flat surface (like a table) with the upper part of the arm at heart level. Place the middle of the cuff directly above the eye of the elbow. Check the monitor's instruction manual for an illustration.  · Take multiple readings. When you measure, take 2 to 3 readings one minute apart and record all of the results.  · Take your blood pressure at the same time every day, or as your healthcare provider recommends.  · Record the date, time, and blood pressure reading.  · Take the record with you to your next medical appointment. If your blood pressure monitor has a built-in memory, simply take the monitor with you to your next appointment.  · Call your provider if you have several high readings. Don't be frightened by a single high blood pressure reading, but if you get several high readings, check in with your healthcare provider.  · Note: When blood pressure reaches a systolic (top number) of 180 or higher OR diastolic (bottom number) of 110 or higher, seek emergency medical treatment.  Follow-up care  You will need to see your healthcare provider regularly. This is to check your blood pressure and to make changes to your medicines. Make a follow-up appointment as directed. Bring the record of your home blood pressure readings to the appointment.  When to seek medical advice  Call your healthcare provider right away if any of these occur:  · Blood pressure reaches a systolic (upper number) of 180 or higher OR a diastolic (bottom number) of 110 or higher  · Chest pain or shortness of breath  · Severe headache  · Throbbing or rushing sound in the ears  · Nosebleed  · Sudden severe pain in your belly (abdomen)  · Extreme drowsiness, confusion, or fainting  · Dizziness or spinning sensation (vertigo)  · Weakness of an arm or leg or one side of the face  · You have problems speaking or seeing   Date Last Reviewed: 12/1/2016  © 2023-0284 [employer]. [address] [place] [address]  [facility], [place] [address]. All rights reserved. This information is not intended as a substitute for professional medical care. Always follow your healthcare professional's instructions.            Diabetes (General Information)  Diabetes is a long-term health problem. It means your body does not make enough insulin. Or it may mean that your body cannot use the insulin it makes. Insulin is a hormone in your body. It lets blood sugar (glucose) reach the cells in your body. All of your cells need glucose for fuel.  When you have diabetes, the glucose in your blood builds up because it cannot get into the cells. This buildup is called high blood sugar (hyperglycemia).  Your blood sugar level depends on several things. It depends on what kind of food you eat and how much of it you eat. It also depends on how much exercise you get, and how much insulin you have in your body. Eating too much of the wrong kinds of food or not taking diabetes medicine on time can cause high blood sugar. Infections can cause high blood sugar even if you are taking medicines correctly.  These things can also cause low blood sugar:  · Missing meals  · Not eating enough food  · Taking too much diabetes medicine  Diabetes can cause serious problems over time if you do not get treated. These problems include heart disease, stroke, kidney failure, and blindness. They also include nerve pain or loss of feeling in your legs and feet, and gangrene of the feet. By keeping your blood sugar under control you can prevent or delay these problems.  Normal blood sugar levels are 80 to 100 before a meal and less than 180 in the 1 to 2 hours after a meal.  Home care  Follow these guidelines when caring for yourself at home:  · Follow the diet your healthcare provider gives you. Take insulin or other diabetes medicine exactly as told to.  · Watch your blood sugar as you are told to. Keep a log of your results. This will help your provider change your  medicines to keep your blood sugar under control.  · Try to reach your ideal weight. You may be able to cut back on or not have to take diabetes medicine if you eat the right foods and get exercise.  · Do not smoke. Smoking worsens the effects of diabetes on your circulation. You are much more likely to have a heart attack if you have diabetes and you smoke.  · Take good care of your feet. If you have lost feeling in your feet, you may not see an injury or infection. Check your feet and between your toes at least once a week.  · Wear a medical alert bracelet or necklace, or carry a card in your wallet that says you have diabetes. This will help healthcare providers give you the right care if you get very ill and cannot tell them that you have diabetes.  Sick day plan  If you get a cold, the flu, or a bacterial or viral infection, take these steps:  · Look at your diabetes sick plan and call your healthcare provider as you were told to. You may need to call your provider right away if:  ¨ Your blood sugar is above 240 while taking your diabetes medicine  ¨ Your urine ketone levels are above normal or high  ¨ You have been vomiting more than 6 hours  ¨ You have trouble breathing or your breath ha s a fruity smell  ¨ You have a high fever  ¨ You have a fever for several days and you are not getting better  ¨ You get light-headed and are sleepier than usual  · Keep taking your diabetes pills (oral medicine) even if you have been vomiting and are feeling sick. Call your provider right away because you may need insulin to lower your blood sugar until you recover from your illness.  · Keep taking your insulin even if you have been vomiting and are feeling sick. Call your provider right away to ask if you need to change your insulin dose. This will depend on your blood sugar results.  · Check your blood sugar every 2 to 4 hours, or at least 4 times a day.  · Check your ketones often. If you are vomiting and having diarrhea,  watch them more often.  · Do not skip meals. Try to eat small meals on a regular schedule. Do this even if you do not feel like eating.  · Drink water or other liquids that do not have caffeine or calories. This will keep you from getting dehydrated. If you are nauseated or vomiting, takes small sips every 5 minutes. To prevent dehydration try to drink a cup (8 ounces) of fluids every hour while you are awake.  General care  Always bring a source of fast-acting sugar with you in case you have symptoms of low blood sugar (below 70). At the first sign of low blood sugar, eat or drink 15 to 20 grams of fast-acting sugar to raise your blood sugar. Examples are:  · 3 to 4 glucose tablets. You can buy these at most [facility].  · 4 ounces (1/2 cup) of regular (not diet) soft drinks  · 4 ounces (1/2 cup) of any fruit juice  · 8 ounces (1 cup) of milk  · 5 to 6 pieces of hard candy  · 1 tablespoon of honey  Check your blood sugar 15 minutes after treating yourself. If it is still below 70, take 15 to 20 more grams of fast-acting sugar. Test again in 15 minutes. If it returns to normal (70 or above), eat a snack or meal to keep your blood sugar in a safe range. If it stays low, call your doctor or go to an emergency room.  Follow-up care  Follow-up with your healthcare provider, or as advised. For more information about diabetes, visit the American Diabetes Association website at www.diabetes.org or call 069-462-8845.  When to seek medical advice  Call your healthcare provider right away if you have any of these symptoms of high blood sugar:  · Frequent urination  · Dizziness  · Drowsiness  · Thirst  · Headache  · Nausea or vomiting  · Abdominal pain  · Eyesight changes  · Fast breathing  · Confusion or loss of consciousness  Also call your provider right away if you have any of these signs of low blood sugar:  · Fatigue  · Headache  · Shakes  · Excess sweating  · Hunger  · Feeling anxious or restless  · Eyesight  changes  · Drowsiness  · Weakness  · Confusion or loss of consciousness  Call 911  Call for emergency help right away if any of these occur:  · Chest pain or shortness of breath  · Dizziness or fainting  · Weakness of an arm or leg or one side of the face  · Trouble speaking or seeing   Date Last Reviewed: 6/1/2016  © 4390-2678 [employer]. [address] [place] [address]. All rights reserved. This information is not intended as a substitute for professional medical care. Always follow your healthcare professional's instructions.

## 2019-03-27 ENCOUNTER — LAB VISIT (OUTPATIENT)
Dept: LAB | Facility: HOSPITAL | Age: 59
End: 2019-03-27
Attending: NURSE PRACTITIONER
Payer: MEDICARE

## 2019-03-27 DIAGNOSIS — E55.9 VITAMIN D DEFICIENCY: ICD-10-CM

## 2019-03-27 DIAGNOSIS — E11.65 TYPE 2 DIABETES MELLITUS WITH HYPERGLYCEMIA, WITH LONG-TERM CURRENT USE OF INSULIN: ICD-10-CM

## 2019-03-27 DIAGNOSIS — Z79.4 TYPE 2 DIABETES MELLITUS WITH HYPERGLYCEMIA, WITH LONG-TERM CURRENT USE OF INSULIN: ICD-10-CM

## 2019-03-27 LAB
25(OH)D3+25(OH)D2 SERPL-MCNC: 10 NG/ML (ref 30–96)
ALBUMIN SERPL BCP-MCNC: 3.5 G/DL (ref 3.5–5.2)
ALP SERPL-CCNC: 96 U/L (ref 55–135)
ALT SERPL W/O P-5'-P-CCNC: 11 U/L (ref 10–44)
ANION GAP SERPL CALC-SCNC: 9 MMOL/L (ref 8–16)
AST SERPL-CCNC: 13 U/L (ref 10–40)
BILIRUB SERPL-MCNC: 0.7 MG/DL (ref 0.1–1)
BUN SERPL-MCNC: 15 MG/DL (ref 8–23)
CALCIUM SERPL-MCNC: 9.3 MG/DL (ref 8.7–10.5)
CHLORIDE SERPL-SCNC: 106 MMOL/L (ref 95–110)
CHOLEST SERPL-MCNC: 132 MG/DL (ref 120–199)
CHOLEST/HDLC SERPL: 2.1 {RATIO} (ref 2–5)
CO2 SERPL-SCNC: 25 MMOL/L (ref 23–29)
CREAT SERPL-MCNC: 0.9 MG/DL (ref 0.5–1.4)
EST. GFR  (AFRICAN AMERICAN): >60 ML/MIN/1.73 M^2
EST. GFR  (NON AFRICAN AMERICAN): >60 ML/MIN/1.73 M^2
ESTIMATED AVG GLUCOSE: 240 MG/DL (ref 68–131)
GLUCOSE SERPL-MCNC: 104 MG/DL (ref 70–110)
HBA1C MFR BLD HPLC: 10 % (ref 4–5.6)
HDLC SERPL-MCNC: 63 MG/DL (ref 40–75)
HDLC SERPL: 47.7 % (ref 20–50)
LDLC SERPL CALC-MCNC: 60.6 MG/DL (ref 63–159)
NONHDLC SERPL-MCNC: 69 MG/DL
POTASSIUM SERPL-SCNC: 3.8 MMOL/L (ref 3.5–5.1)
PROT SERPL-MCNC: 7.5 G/DL (ref 6–8.4)
SODIUM SERPL-SCNC: 140 MMOL/L (ref 136–145)
TRIGL SERPL-MCNC: 42 MG/DL (ref 30–150)
TSH SERPL DL<=0.005 MIU/L-ACNC: 1.02 UIU/ML (ref 0.4–4)

## 2019-03-27 PROCEDURE — 83036 HEMOGLOBIN GLYCOSYLATED A1C: CPT

## 2019-03-27 PROCEDURE — 80053 COMPREHEN METABOLIC PANEL: CPT

## 2019-03-27 PROCEDURE — 36415 COLL VENOUS BLD VENIPUNCTURE: CPT | Mod: PO

## 2019-03-27 PROCEDURE — 84443 ASSAY THYROID STIM HORMONE: CPT

## 2019-03-27 PROCEDURE — 80061 LIPID PANEL: CPT

## 2019-03-27 PROCEDURE — 82306 VITAMIN D 25 HYDROXY: CPT

## 2019-04-09 ENCOUNTER — DOCUMENTATION ONLY (OUTPATIENT)
Dept: FAMILY MEDICINE | Facility: CLINIC | Age: 59
End: 2019-04-09

## 2019-04-11 ENCOUNTER — TELEPHONE (OUTPATIENT)
Dept: FAMILY MEDICINE | Facility: CLINIC | Age: 59
End: 2019-04-11

## 2019-04-11 NOTE — TELEPHONE ENCOUNTER
----- Message from Cecilia Roth sent at 4/11/2019  2:21 PM CDT -----  Contact: ms hussein-[facility] ph[facility]  needs call back regarding kerwin matthewso...[phone]

## 2019-04-26 ENCOUNTER — TELEPHONE (OUTPATIENT)
Dept: FAMILY MEDICINE | Facility: CLINIC | Age: 59
End: 2019-04-26

## 2019-04-26 ENCOUNTER — CLINICAL SUPPORT (OUTPATIENT)
Dept: FAMILY MEDICINE | Facility: CLINIC | Age: 59
End: 2019-04-26
Payer: MEDICARE

## 2019-04-26 VITALS — DIASTOLIC BLOOD PRESSURE: 80 MMHG | SYSTOLIC BLOOD PRESSURE: 178 MMHG

## 2019-04-26 DIAGNOSIS — I15.2 HYPERTENSION ASSOCIATED WITH DIABETES: Primary | ICD-10-CM

## 2019-04-26 DIAGNOSIS — E11.59 HYPERTENSION ASSOCIATED WITH DIABETES: Primary | ICD-10-CM

## 2019-04-26 PROCEDURE — 99999 PR PBB SHADOW E&M-EST. PATIENT-LVL I: ICD-10-PCS | Mod: PBBFAC,,,

## 2019-04-26 PROCEDURE — 99999 PR PBB SHADOW E&M-EST. PATIENT-LVL I: CPT | Mod: PBBFAC,,,

## 2019-04-26 NOTE — TELEPHONE ENCOUNTER
Last BP was 145/71 on 3/26/19. Last dose of BP medication was this morning. At home readings have been done but not recorded; patient states it is running high. BP today 178/80. Patient states that is better than it has been at home.

## 2019-04-26 NOTE — PROGRESS NOTES
Last BP was 145/71 on 3/26/19. Last dose of BP medication was this morning. At home readings have been done but not recorded; patient states it is running high. BP today 178/80.

## 2019-04-26 NOTE — TELEPHONE ENCOUNTER
----- Message from Jose Jacobsen sent at 4/26/2019 10:16 AM CDT -----  Contact: self   Placed call to pod, Patient want to speak with a nurse regarding still coming in today for blood pressure check please call back at [phone] (home)

## 2019-05-05 RX ORDER — CHLORTHALIDONE 25 MG/1
25 TABLET ORAL DAILY
Qty: 30 TABLET | Refills: 11 | Status: SHIPPED | OUTPATIENT
Start: 2019-05-05 | End: 2019-06-05 | Stop reason: SDUPTHER

## 2019-05-20 ENCOUNTER — OFFICE VISIT (OUTPATIENT)
Dept: ORTHOPEDICS | Facility: CLINIC | Age: 59
End: 2019-05-20
Payer: MEDICARE

## 2019-05-20 VITALS
HEART RATE: 57 BPM | BODY MASS INDEX: 27.82 KG/M2 | DIASTOLIC BLOOD PRESSURE: 77 MMHG | SYSTOLIC BLOOD PRESSURE: 172 MMHG | WEIGHT: 138 LBS | HEIGHT: 59 IN

## 2019-05-20 DIAGNOSIS — M75.122 COMPLETE TEAR OF LEFT ROTATOR CUFF: Primary | ICD-10-CM

## 2019-05-20 PROCEDURE — 3077F PR MOST RECENT SYSTOLIC BLOOD PRESSURE >= 140 MM HG: ICD-10-PCS | Mod: CPTII,S$GLB,, | Performed by: ORTHOPAEDIC SURGERY

## 2019-05-20 PROCEDURE — 20610 LARGE JOINT ASPIRATION/INJECTION: R SUBACROMIAL BURSA, L SUBACROMIAL BURSA: ICD-10-PCS | Mod: 50,S$GLB,, | Performed by: ORTHOPAEDIC SURGERY

## 2019-05-20 PROCEDURE — 3078F PR MOST RECENT DIASTOLIC BLOOD PRESSURE < 80 MM HG: ICD-10-PCS | Mod: CPTII,S$GLB,, | Performed by: ORTHOPAEDIC SURGERY

## 2019-05-20 PROCEDURE — 3008F BODY MASS INDEX DOCD: CPT | Mod: CPTII,S$GLB,, | Performed by: ORTHOPAEDIC SURGERY

## 2019-05-20 PROCEDURE — 20610 DRAIN/INJ JOINT/BURSA W/O US: CPT | Mod: 50,S$GLB,, | Performed by: ORTHOPAEDIC SURGERY

## 2019-05-20 PROCEDURE — 3078F DIAST BP <80 MM HG: CPT | Mod: CPTII,S$GLB,, | Performed by: ORTHOPAEDIC SURGERY

## 2019-05-20 PROCEDURE — 99999 PR PBB SHADOW E&M-EST. PATIENT-LVL III: ICD-10-PCS | Mod: PBBFAC,,, | Performed by: ORTHOPAEDIC SURGERY

## 2019-05-20 PROCEDURE — 99213 OFFICE O/P EST LOW 20 MIN: CPT | Mod: 25,S$GLB,, | Performed by: ORTHOPAEDIC SURGERY

## 2019-05-20 PROCEDURE — 99213 PR OFFICE/OUTPT VISIT, EST, LEVL III, 20-29 MIN: ICD-10-PCS | Mod: 25,S$GLB,, | Performed by: ORTHOPAEDIC SURGERY

## 2019-05-20 PROCEDURE — 3008F PR BODY MASS INDEX (BMI) DOCUMENTED: ICD-10-PCS | Mod: CPTII,S$GLB,, | Performed by: ORTHOPAEDIC SURGERY

## 2019-05-20 PROCEDURE — 3077F SYST BP >= 140 MM HG: CPT | Mod: CPTII,S$GLB,, | Performed by: ORTHOPAEDIC SURGERY

## 2019-05-20 PROCEDURE — 99999 PR PBB SHADOW E&M-EST. PATIENT-LVL III: CPT | Mod: PBBFAC,,, | Performed by: ORTHOPAEDIC SURGERY

## 2019-05-20 RX ORDER — TRIAMCINOLONE ACETONIDE 40 MG/ML
40 INJECTION, SUSPENSION INTRA-ARTICULAR; INTRAMUSCULAR
Status: DISCONTINUED | OUTPATIENT
Start: 2019-05-20 | End: 2019-05-20 | Stop reason: HOSPADM

## 2019-05-20 RX ADMIN — TRIAMCINOLONE ACETONIDE 40 MG: 40 INJECTION, SUSPENSION INTRA-ARTICULAR; INTRAMUSCULAR at 09:05

## 2019-05-20 NOTE — PROCEDURES
Large Joint Aspiration/Injection: R subacromial bursa, L subacromial bursa  Date/Time: 5/20/2019 9:04 AM  Performed by: Messi Molina MD  Authorized by: Messi Molina MD     Consent Done?:  Yes (Verbal)  Indications:  Pain  Procedure site marked: Yes    Timeout: Prior to procedure the correct patient, procedure, and site was verified      Location:  Shoulder  Site:  R subacromial bursa and L subacromial bursa  Prep: Patient was prepped and draped in usual sterile fashion    Ultrasonic Guidance for needle placement: No  Needle size:  20 G  Approach:  Posterior  Medications:  40 mg triamcinolone acetonide 40 mg/mL; 40 mg triamcinolone acetonide 40 mg/mL  Patient tolerance:  Patient tolerated the procedure well with no immediate complications

## 2019-05-20 NOTE — PROGRESS NOTES
"  Past Medical History:   Diagnosis Date    Arthritis     COPD (chronic obstructive pulmonary disease)     Diabetes mellitus     Hypertension     Wears glasses        Past Surgical History:   Procedure Laterality Date    ARTHROSCOPY-SHOULDER WITH SUBACROMIAL DECOMPRESSION Left 3/16/2018    Per[facility]    COLONOSCOPY N/A 4/11/2017    Performed by Jared Antonio MD at [facility] ENDO    MLIUBBCS-NHELERXG-LUZCXV END Left 3/16/2018    Performed by Mess[facility] OR    HYSTERECTOMY      OOPHORECTOMY      REPAIR ROTATOR CUFF ARTHROSCOPIC Left 3/16/2018    Performed by [facility] at [facility] OR    SHOULDER SURGERY      R       Current Outpatient Medications   Medication Sig    amlodipine-valsartan (EXFORGE)  mg per tablet Take 1 tablet by mouth once daily.    atorvastatin (LIPITOR) 20 MG tablet Take 1 tablet (20 mg total) by mouth once daily.    BLOOD PRESSURE CUFF Misc 1 kit by Misc.(Non-Drug; Combo Route) route 3 (three) times daily.    blood pressure monitor (BLOOD PRESSURE KIT) Kit 1 kit by Misc.(Non-Drug; Combo Route) route 3 (three) times daily.    blood sugar diagnostic Strp To check BG 2 times daily, to use with insurance preferred meter    blood-glucose meter kit To check BG 2 times daily, to use with insurance preferred meter    chlorthalidone (HYGROTEN) 25 MG Tab Take 1 tablet (25 mg total) by mouth once daily.    cloNIDine 0.3 mg/24 hr td ptwk (CATAPRES) 0.3 mg/24 hr Place 1 patch onto the skin every 7 days.    clotrimazole-betamethasone 1-0.05% (LOTRISONE) cream Apply topically 2 (two) times daily.    COMFORT EZ PEN NEEDLES 31 gauge x 3/16" Ndle 1 each by Misc.(Non-Drug; Combo Route) route 2 (two) times daily.    diclofenac sodium (VOLTAREN) 1 % Gel Apply 2 g topically 4 (four) times daily.    fluticasone (FLONASE) 50 mcg/actuation nasal spray 1 spray (50 mcg total) by Each Nare route once daily.    glimepiride " "(AMARYL) 4 MG tablet Take 1 tablet (4 mg total) by mouth before breakfast.    HYDROcodone-acetaminophen (NORCO)  mg per tablet Take 1 tablet by mouth every 6 (six) hours as needed for Pain.    insulin degludec (TRESIBA FLEXTOUCH U-100) 100 unit/mL (3 mL) InPn Inject 30 Units into the skin once daily.    lancets Misc To check BG 2 times daily, to use with insurance preferred meter    metoprolol succinate (TOPROL-XL) 100 MG 24 hr tablet Take 1 tablet (100 mg total) by mouth once daily.    pen needle, diabetic (PEN NEEDLE) 30 gauge x 5/16" Ndle Inject 2 x /day    tiotropium (SPIRIVA WITH HANDIHALER) 18 mcg inhalation capsule Inhale 1 capsule (18 mcg total) into the lungs once daily.    tiZANidine (ZANAFLEX) 4 MG tablet TAKE 1 TABLET(4 MG) BY MOUTH EVERY 6 HOURS AS NEEDED FOR MUSCLE SPASM    spironolactone (ALDACTONE) 25 MG tablet Take 1 tablet (25 mg total) by mouth every morning.     No current facility-administered medications for this visit.        Review of patient's allergies indicates:  No Known Allergies    Family History   Problem Relation Age of Onset    Diabetes Mother     Asthma Mother     Hypertension Mother     Diabetes Father     Diabetes Brother     Hypertension Brother     Anemia Daughter        Social History     Socioeconomic History    Marital status: Single     Spouse name: Not on file    Number of children: Not on file    Years of education: Not on file    Highest education level: Not on file   Occupational History     Comment: disabled due to shoulder problems   Social Needs    Financial resource strain: Not on file    Food insecurity:     Worry: Not on file     Inability: Not on file    Transportation needs:     Medical: Not on file     Non-medical: Not on file   Tobacco Use    Smoking status: Never Smoker    Smokeless tobacco: Never Used   Substance and Sexual Activity    Alcohol use: No    Drug use: No    Sexual activity: Not Currently   Lifestyle    Physical " activity:     Days per week: Not on file     Minutes per session: Not on file    Stress: Not on file   Relationships    Social connections:     Talks on phone: Not on file     Gets together: Not on file     Attends Sikh service: Not on file     Active member of club or organization: Not on file     Attends meetings of clubs or organizations: Not on file     Relationship status: Not on file   Other Topics Concern    Not on file   Social History Narrative    Not on file       Chief Complaint:   Chief Complaint   Patient presents with    Shoulder Pain     bilateral shoulder pain follow up        Date of surgery: March 16, 2018    History of present illness: 59-year-old female underwent left rotator cuff repair for a massive retracted rotator cuff tear.    Patient had a suture bridge technique performed.  We used suture tape.    Has a history of prior right rotator cuff repair. She is no longer in physical therapy.  Both shoulders are hurting her a little more recently.  She has a little tightness in both shoulder still.  She rates the pain is 7/10.    Review of Systems:    Musculoskeletal:  See HPI        Physical Examination:    Vital Signs:    Vitals:    05/20/19 0858   BP: (!) 172/77   Pulse: (!) 57       Body mass index is 27.87 kg/m².    This a well-developed, well nourished patient in no acute distress.  They are alert and oriented and cooperative to examination.  Pt. walks without an antalgic gait.      Examination left shoulder shows healed surgical portals.  No erythema or drainage.  No bruising.  Just some very minimal tightness at the end of range of motion. No irritability.  Forward flexion of about 160°.  External rotation about 80°.    Examination of the right shoulder shows moderate impingement pain.  Forward flexion of 160° with external rotation of 80°.  Prior healed surgical scar noted.  Some swelling and tenderness near the AC joint with a mild cyst    X-rays: None     Assessment:: Status  post left suture bridge rotator cuff repair for massive tear  Bilateral rotator cuff syndrome    Plan:  Continue with the home exercises on both shoulders.  I recommended a cortisone injection of both shoulders help with her pain. We talked about possibly getting her back into physical therapy if they do not improved.  Follow-up as needed.    This note was created using [employer] voice recognition software that occasionally misinterpreted phrases or words.

## 2019-05-22 DIAGNOSIS — E11.65 TYPE 2 DIABETES MELLITUS WITH HYPERGLYCEMIA, WITH LONG-TERM CURRENT USE OF INSULIN: ICD-10-CM

## 2019-05-22 DIAGNOSIS — Z79.4 TYPE 2 DIABETES MELLITUS WITH HYPERGLYCEMIA, WITH LONG-TERM CURRENT USE OF INSULIN: ICD-10-CM

## 2019-05-23 RX ORDER — PEN NEEDLE, DIABETIC 30 GX3/16"
NEEDLE, DISPOSABLE MISCELLANEOUS
Qty: 200 EACH | Refills: 3 | Status: SHIPPED | OUTPATIENT
Start: 2019-05-23 | End: 2019-08-07

## 2019-06-04 ENCOUNTER — CLINICAL SUPPORT (OUTPATIENT)
Dept: FAMILY MEDICINE | Facility: CLINIC | Age: 59
End: 2019-06-04
Payer: MEDICARE

## 2019-06-04 ENCOUNTER — TELEPHONE (OUTPATIENT)
Dept: FAMILY MEDICINE | Facility: CLINIC | Age: 59
End: 2019-06-04

## 2019-06-04 VITALS — DIASTOLIC BLOOD PRESSURE: 104 MMHG | SYSTOLIC BLOOD PRESSURE: 150 MMHG

## 2019-06-04 DIAGNOSIS — E11.59 HYPERTENSION ASSOCIATED WITH DIABETES: ICD-10-CM

## 2019-06-04 DIAGNOSIS — I10 ESSENTIAL HYPERTENSION: Primary | ICD-10-CM

## 2019-06-04 DIAGNOSIS — I15.2 HYPERTENSION ASSOCIATED WITH DIABETES: ICD-10-CM

## 2019-06-04 PROCEDURE — 99999 PR PBB SHADOW E&M-EST. PATIENT-LVL III: CPT | Mod: PBBFAC,,,

## 2019-06-04 PROCEDURE — 99999 PR PBB SHADOW E&M-EST. PATIENT-LVL III: ICD-10-PCS | Mod: PBBFAC,,,

## 2019-06-04 NOTE — PROGRESS NOTES
Pt comes in for a nurse B/P check. Pt's BP was 150/110, manual on right arm, sitting position. After 5 minutes rest, BP was at 150/104.  PCP notified regarding BP readings in clinic, advised patient to con't with current treatment plan and we will reach out with further recommendations. Pt denied chest pain, SOB, weakness or dizziness in clinic today. Advised if anything of mentioned symptoms occur to present to ER. Pt verbalized understanding.

## 2019-06-04 NOTE — TELEPHONE ENCOUNTER
Pt presented to clinic for Nurse BP check today. First reading was 150/110. Second reading after 5 minutes rest 150/104.  Advised message will be sent to Dr. Ren for further recommendation. Pt denied SOB, chest pain or dizziness. Advised ER if any mentioned sx develops. Understanding verbalized.

## 2019-06-05 RX ORDER — CHLORTHALIDONE 25 MG/1
50 TABLET ORAL DAILY
Qty: 90 TABLET | Refills: 3 | Status: SHIPPED | OUTPATIENT
Start: 2019-06-05 | End: 2021-02-11 | Stop reason: SDUPTHER

## 2019-06-05 NOTE — TELEPHONE ENCOUNTER
I called the patient and she answered but stated she needed to call me back because she was driving.

## 2019-06-06 NOTE — TELEPHONE ENCOUNTER
I spoke to the patient and let her know about the increased medication being sent to her pharmacy. I also scheduled her 4 week bp recheck.

## 2019-07-03 ENCOUNTER — TELEPHONE (OUTPATIENT)
Dept: ENDOCRINOLOGY | Facility: CLINIC | Age: 59
End: 2019-07-03

## 2019-07-03 ENCOUNTER — LAB VISIT (OUTPATIENT)
Dept: LAB | Facility: HOSPITAL | Age: 59
End: 2019-07-03
Attending: NURSE PRACTITIONER
Payer: MEDICARE

## 2019-07-03 DIAGNOSIS — Z79.4 TYPE 2 DIABETES MELLITUS WITH HYPERGLYCEMIA, WITH LONG-TERM CURRENT USE OF INSULIN: Primary | ICD-10-CM

## 2019-07-03 DIAGNOSIS — Z79.4 TYPE 2 DIABETES MELLITUS WITH HYPERGLYCEMIA, WITH LONG-TERM CURRENT USE OF INSULIN: ICD-10-CM

## 2019-07-03 DIAGNOSIS — E55.9 VITAMIN D DEFICIENCY: ICD-10-CM

## 2019-07-03 DIAGNOSIS — E11.65 TYPE 2 DIABETES MELLITUS WITH HYPERGLYCEMIA, WITH LONG-TERM CURRENT USE OF INSULIN: Primary | ICD-10-CM

## 2019-07-03 DIAGNOSIS — E11.65 TYPE 2 DIABETES MELLITUS WITH HYPERGLYCEMIA, WITH LONG-TERM CURRENT USE OF INSULIN: ICD-10-CM

## 2019-07-03 LAB
25(OH)D3+25(OH)D2 SERPL-MCNC: 8 NG/ML (ref 30–96)
ALBUMIN SERPL BCP-MCNC: 3.5 G/DL (ref 3.5–5.2)
ALP SERPL-CCNC: 98 U/L (ref 55–135)
ALT SERPL W/O P-5'-P-CCNC: 12 U/L (ref 10–44)
ANION GAP SERPL CALC-SCNC: 10 MMOL/L (ref 8–16)
AST SERPL-CCNC: 12 U/L (ref 10–40)
BILIRUB SERPL-MCNC: 0.4 MG/DL (ref 0.1–1)
BUN SERPL-MCNC: 21 MG/DL (ref 8–23)
CALCIUM SERPL-MCNC: 9.5 MG/DL (ref 8.7–10.5)
CHLORIDE SERPL-SCNC: 102 MMOL/L (ref 95–110)
CO2 SERPL-SCNC: 25 MMOL/L (ref 23–29)
CREAT SERPL-MCNC: 1.3 MG/DL (ref 0.5–1.4)
EST. GFR  (AFRICAN AMERICAN): 51.2 ML/MIN/1.73 M^2
EST. GFR  (NON AFRICAN AMERICAN): 44.4 ML/MIN/1.73 M^2
ESTIMATED AVG GLUCOSE: 298 MG/DL (ref 68–131)
GLUCOSE SERPL-MCNC: 457 MG/DL (ref 70–110)
HBA1C MFR BLD HPLC: 12 % (ref 4–5.6)
POTASSIUM SERPL-SCNC: 4.5 MMOL/L (ref 3.5–5.1)
PROT SERPL-MCNC: 7.3 G/DL (ref 6–8.4)
SODIUM SERPL-SCNC: 137 MMOL/L (ref 136–145)
TSH SERPL DL<=0.005 MIU/L-ACNC: 0.97 UIU/ML (ref 0.4–4)

## 2019-07-03 PROCEDURE — 82306 VITAMIN D 25 HYDROXY: CPT

## 2019-07-03 PROCEDURE — 36415 COLL VENOUS BLD VENIPUNCTURE: CPT | Mod: PO

## 2019-07-03 PROCEDURE — 80053 COMPREHEN METABOLIC PANEL: CPT

## 2019-07-03 PROCEDURE — 83036 HEMOGLOBIN GLYCOSYLATED A1C: CPT

## 2019-07-03 PROCEDURE — 84443 ASSAY THYROID STIM HORMONE: CPT

## 2019-07-04 NOTE — TELEPHONE ENCOUNTER
Received call from lab regarding critical glucose of 457 mg/dL. This was drawn at 1145 AM.    Called pt and left voicemail. Advised her to check BG and if it remains > 450, to go to ER. If she notices glucose is significantly elevated, drink plenty of water and check urine for ketones (go to ER if ketone strip shows moderate to large amt of ketones). I advised pt to take her insulin and glimepiride as prescribed and call for sooner appt if she has been noticing glucoses this elevated recently.

## 2019-07-05 ENCOUNTER — CLINICAL SUPPORT (OUTPATIENT)
Dept: FAMILY MEDICINE | Facility: CLINIC | Age: 59
End: 2019-07-05
Payer: MEDICARE

## 2019-07-05 ENCOUNTER — TELEPHONE (OUTPATIENT)
Dept: FAMILY MEDICINE | Facility: CLINIC | Age: 59
End: 2019-07-05

## 2019-07-05 VITALS — DIASTOLIC BLOOD PRESSURE: 74 MMHG | SYSTOLIC BLOOD PRESSURE: 128 MMHG

## 2019-07-05 PROCEDURE — 99999 PR PBB SHADOW E&M-EST. PATIENT-LVL I: CPT | Mod: PBBFAC,,,

## 2019-07-05 PROCEDURE — 99999 PR PBB SHADOW E&M-EST. PATIENT-LVL I: ICD-10-PCS | Mod: PBBFAC,,,

## 2019-07-05 NOTE — PROGRESS NOTES
Last BP was 145/71 3/26/19. Last dose of BP medication was this morning. At home readings are being done sometimes but not recorded. Patient is having a hard time with her machine, it may need new batteries. BP today 128/74. Patient states it is good after she has her medicine and she took it about an hour before coming in.     Patient would like to know what kind of vitamins she can take. She states she does not have much energy especially in the morning and would like to start taking some vitamins. Please advise.

## 2019-07-08 NOTE — TELEPHONE ENCOUNTER
Wow.  That's awesome!  She has been difficult to get to goal    OTC multivitamin for women over 50 is fine.

## 2019-08-06 NOTE — PROGRESS NOTES
CC: This 59 y.o. female presents for management of diabetes  and chronic conditions pending review including HTN, HLP, nephropathy     HPI: She was diagnosed with T2DM in . Has never been hospitalized r/t DM.  Family hx of DM: mom, dad, brothers, and sister     Not seen in 6 months, A1c is grossly uncontrolled    hypoglycemia at home-none    monitoring BG at home: No log or meter   Reports  bg 139 fasting in office    Fastin-169  Dinner: 260s  Bedtime: 200s     Diet: Eats 3 meals a day, snacks- 1/2 PB and jelly- sandwich w milk, little bag of chips  Exercise: none  Dm education x1 at diagnosis     CURRENT DM MEDS: tresiba 30 u qhs, glimepiride 4 mg qam   Vial/pen:  Uses pens  Glucometer type:  True Metrix     Standards of Care:  Eye exam: 2018 + DM retinopathy, + cataracts   (missed her 6 mon f/u- will schedule today)    Follows w Dr Ruthann barclay nephrology     Retired from [employer] de[employer] (worked for them for 20 yrs)     +polyuria, polydypsia    ROS:   Gen: Appetite good, + weight loss 4 lbs, denies fatigue and weakness.  Skin: Skin is intact and heals well, no rashes, no hair changes  Eyes: Denies visual disturbances  Resp: no SOB or SEGUNDO, no cough  Cardiac: No palpitations, chest pain, no edema   GI: No nausea or vomiting, diarrhea, constipation, or abdominal pain.  /GYN:4-5+ nocturia, no burning or pain.   MS/Neuro: Denies numbness/ tingling in BLE; Gait steady, speech clear  Psych: Denies drug/ETOH abuse, no hx of depression.  Other systems: negative.    PE:  GENERAL: Well developed, well nourished.  PSYCH: AAOx3, appropriate mood and affect, pleasant expression, conversant, appears relaxed, well groomed.   EYES: Conjunctiva, corneas clear  NECK: Supple, trachea midline  NEURO: Gait steady  SKIN: Skin warm and dry no acanthosis nigracans.  FOOT EXAMINATION: 2018  No foot deformity, corns or callus formation,  nails in good condition and well trimmed, no interspace maceration or  ulceration noted.  Decreased hair growth present over toes/feet.  Positive vibratory response to 128 Hz tuning fork bilaterally.  Protective sensation intact with 10 gram monofilament.  +2 dorsalis pedis and posterior pulses noted.      Lab Results   Component Value Date    MICALBCREAT 25.7 07/03/2019       Hemoglobin A1C   Date Value Ref Range Status   07/03/2019 12.0 (H) 4.0 - 5.6 % Final     Comment:     ADA Screening Guidelines:  5.7-6.4%  Consistent with prediabetes  >or=6.5%  Consistent with diabetes  High levels of fetal hemoglobin interfere with the HbA1C  assay. Heterozygous hemoglobin variants (HbS, HgC, etc)do  not significantly interfere with this assay.   However, presence of multiple variants may affect accuracy.     03/27/2019 10.0 (H) 4.0 - 5.6 % Final     Comment:     ADA Screening Guidelines:  5.7-6.4%  Consistent with prediabetes  >or=6.5%  Consistent with diabetes  High levels of fetal hemoglobin interfere with the HbA1C  assay. Heterozygous hemoglobin variants (HbS, HgC, etc)do  not significantly interfere with this assay.   However, presence of multiple variants may affect accuracy.     01/02/2019 11.0 (H) 4.0 - 5.6 % Final     Comment:     ADA Screening Guidelines:  5.7-6.4%  Consistent with prediabetes  >or=6.5%  Consistent with diabetes  High levels of fetal hemoglobin interfere with the HbA1C  assay. Heterozygous hemoglobin variants (HbS, HgC, etc)do  not significantly interfere with this assay.   However, presence of multiple variants may affect accuracy.          ASSESSMENT and PLAN:    1. T2DM with hyperglycemia, DM nephropathy, Dm retinopathy  CGMS today  Decrease Tresiba to 22 u qhs, Start Novolog 4 u Ac take 5-15 mins prior to meal  D/c glimepiride  Long discussion on continued worsening of complications and avila to take insulin as prescribed  DM eduation ASAP  Check bg 4 times a day    in office   Discussed A1c and BG goals.   - takes  Arb, statin    2. HTN - uncontrolled,  continue meds as previously prescribed and monitor.     3. HLP - on statin therapy, LFTs WNL    4. COPD- No current steroids    5.  Dm nephropathy follows w Dr Echols     6. Vitamin d deficiency Start ergocalciferol 71565 IU weekly    Follow-up: in 3 months with lab prior

## 2019-08-07 ENCOUNTER — OFFICE VISIT (OUTPATIENT)
Dept: ENDOCRINOLOGY | Facility: CLINIC | Age: 59
End: 2019-08-07
Payer: MEDICARE

## 2019-08-07 VITALS
WEIGHT: 133.38 LBS | BODY MASS INDEX: 26.89 KG/M2 | HEIGHT: 59 IN | SYSTOLIC BLOOD PRESSURE: 148 MMHG | DIASTOLIC BLOOD PRESSURE: 92 MMHG | TEMPERATURE: 98 F | HEART RATE: 66 BPM

## 2019-08-07 DIAGNOSIS — Z79.4 TYPE 2 DIABETES MELLITUS WITH DIABETIC NEPHROPATHY, WITH LONG-TERM CURRENT USE OF INSULIN: Primary | ICD-10-CM

## 2019-08-07 DIAGNOSIS — E78.2 MIXED HYPERLIPIDEMIA: ICD-10-CM

## 2019-08-07 DIAGNOSIS — E11.21 TYPE 2 DIABETES MELLITUS WITH DIABETIC NEPHROPATHY, WITH LONG-TERM CURRENT USE OF INSULIN: Primary | ICD-10-CM

## 2019-08-07 DIAGNOSIS — Z79.4 TYPE 2 DIABETES MELLITUS WITH HYPERGLYCEMIA, WITH LONG-TERM CURRENT USE OF INSULIN: ICD-10-CM

## 2019-08-07 DIAGNOSIS — I15.2 HYPERTENSION ASSOCIATED WITH DIABETES: ICD-10-CM

## 2019-08-07 DIAGNOSIS — E11.59 HYPERTENSION ASSOCIATED WITH DIABETES: ICD-10-CM

## 2019-08-07 DIAGNOSIS — Z79.4 TYPE 2 DIABETES MELLITUS WITH BOTH EYES AFFECTED BY MODERATE NONPROLIFERATIVE RETINOPATHY WITHOUT MACULAR EDEMA, WITH LONG-TERM CURRENT USE OF INSULIN: ICD-10-CM

## 2019-08-07 DIAGNOSIS — E55.9 VITAMIN D DEFICIENCY: ICD-10-CM

## 2019-08-07 DIAGNOSIS — J44.9 CHRONIC OBSTRUCTIVE PULMONARY DISEASE, UNSPECIFIED COPD TYPE: ICD-10-CM

## 2019-08-07 DIAGNOSIS — E11.3393 TYPE 2 DIABETES MELLITUS WITH BOTH EYES AFFECTED BY MODERATE NONPROLIFERATIVE RETINOPATHY WITHOUT MACULAR EDEMA, WITH LONG-TERM CURRENT USE OF INSULIN: ICD-10-CM

## 2019-08-07 DIAGNOSIS — E11.65 TYPE 2 DIABETES MELLITUS WITH HYPERGLYCEMIA, WITH LONG-TERM CURRENT USE OF INSULIN: ICD-10-CM

## 2019-08-07 PROCEDURE — 99214 OFFICE O/P EST MOD 30 MIN: CPT | Mod: S$GLB,,, | Performed by: NURSE PRACTITIONER

## 2019-08-07 PROCEDURE — 99999 PR PBB SHADOW E&M-EST. PATIENT-LVL V: ICD-10-PCS | Mod: PBBFAC,,, | Performed by: NURSE PRACTITIONER

## 2019-08-07 PROCEDURE — 99999 PR PBB SHADOW E&M-EST. PATIENT-LVL V: CPT | Mod: PBBFAC,,, | Performed by: NURSE PRACTITIONER

## 2019-08-07 PROCEDURE — 99214 PR OFFICE/OUTPT VISIT, EST, LEVL IV, 30-39 MIN: ICD-10-PCS | Mod: S$GLB,,, | Performed by: NURSE PRACTITIONER

## 2019-08-07 RX ORDER — INSULIN ASPART 100 [IU]/ML
4 INJECTION, SOLUTION INTRAVENOUS; SUBCUTANEOUS
Qty: 15 ML | Refills: 12 | Status: SHIPPED | OUTPATIENT
Start: 2019-08-07 | End: 2019-08-21

## 2019-08-07 RX ORDER — INSULIN DEGLUDEC 100 U/ML
22 INJECTION, SOLUTION SUBCUTANEOUS DAILY
Qty: 10 SYRINGE | Refills: 3 | Status: SHIPPED | OUTPATIENT
Start: 2019-08-07 | End: 2019-08-21

## 2019-08-07 RX ORDER — ERGOCALCIFEROL 1.25 MG/1
50000 CAPSULE ORAL
Qty: 12 CAPSULE | Refills: 4 | Status: ON HOLD | OUTPATIENT
Start: 2019-08-07 | End: 2019-12-14 | Stop reason: HOSPADM

## 2019-08-07 RX ORDER — PEN NEEDLE, DIABETIC 30 GX3/16"
NEEDLE, DISPOSABLE MISCELLANEOUS
Qty: 400 EACH | Refills: 4 | Status: SHIPPED | OUTPATIENT
Start: 2019-08-07 | End: 2020-05-28 | Stop reason: SDUPTHER

## 2019-08-07 RX ORDER — TIOTROPIUM BROMIDE 18 UG/1
18 CAPSULE ORAL; RESPIRATORY (INHALATION) DAILY
Qty: 90 CAPSULE | Refills: 1 | Status: SHIPPED | OUTPATIENT
Start: 2019-08-07 | End: 2019-08-26 | Stop reason: SDUPTHER

## 2019-08-07 NOTE — PROGRESS NOTES
CGMS  Freestyle Connor pro placed on patients right upper arm Sub Q. Patient tolerated procedure well. Food log explained, patient verbalized understanding. Patient will return on  for removal 8-21-19.  [id number] Exp date 09-.

## 2019-08-15 ENCOUNTER — TELEPHONE (OUTPATIENT)
Dept: ENDOCRINOLOGY | Facility: CLINIC | Age: 59
End: 2019-08-15

## 2019-08-15 NOTE — TELEPHONE ENCOUNTER
----- Message from Cosmo Nicholson sent at 8/15/2019  1:33 PM CDT -----  Contact: patient  Type: Needs Medical Advice    Who Called:  patient  Symptoms (please be specific):    How long has patient had these symptoms:    Pharmacy name and phone #:    Best Call Back Number: [phone]  Additional Information: requesting a call back regarding tape that is coming off her machine,need to know if she can come in for a visit?

## 2019-08-16 ENCOUNTER — NURSE TRIAGE (OUTPATIENT)
Dept: ADMINISTRATIVE | Facility: CLINIC | Age: 59
End: 2019-08-16

## 2019-08-17 NOTE — TELEPHONE ENCOUNTER
Reason for Disposition   [1] Caller requesting NON-URGENT health information AND [2] PCP's office is the best resource    Additional Information   Negative: [1] Caller is not with the adult (patient) AND [2] reporting urgent symptoms   Negative: Lab result questions   Negative: Medication questions   Negative: Caller can't be reached by phone   Negative: Caller has already spoken to PCP or another triager   Negative: RN needs further essential information from caller in order to complete triage   Negative: Requesting regular office appointment    Protocols used: INFORMATION ONLY CALL-A-  Pt states she was seen last week. pt states glucose monitor/sensor came completely off. pt put device in sandwich bag. In interim rec to check BS with BS strips. Call back with questions

## 2019-08-19 ENCOUNTER — TELEPHONE (OUTPATIENT)
Dept: ENDOCRINOLOGY | Facility: CLINIC | Age: 59
End: 2019-08-19

## 2019-08-19 NOTE — TELEPHONE ENCOUNTER
----- Message from Louann Pacheco sent at 8/19/2019  8:26 AM CDT -----  Type: Needs Medical Advice    Who Called:  Patient     Best Call Back Number: [phone]  Additional Information: patient contact office on Thursday 8/15 regarding her sensors tape is coming off needs advise - there was 3 prongs and 2 are missing she believes they may have broken off in her arm please contact patient regarding this issue

## 2019-08-21 ENCOUNTER — CLINICAL SUPPORT (OUTPATIENT)
Dept: ENDOCRINOLOGY | Facility: CLINIC | Age: 59
End: 2019-08-21
Payer: MEDICARE

## 2019-08-21 ENCOUNTER — TELEPHONE (OUTPATIENT)
Dept: ENDOCRINOLOGY | Facility: CLINIC | Age: 59
End: 2019-08-21

## 2019-08-21 DIAGNOSIS — E11.65 TYPE 2 DIABETES MELLITUS WITH HYPERGLYCEMIA, WITH LONG-TERM CURRENT USE OF INSULIN: ICD-10-CM

## 2019-08-21 DIAGNOSIS — Z79.4 TYPE 2 DIABETES MELLITUS WITH DIABETIC NEPHROPATHY, WITH LONG-TERM CURRENT USE OF INSULIN: ICD-10-CM

## 2019-08-21 DIAGNOSIS — E11.21 TYPE 2 DIABETES MELLITUS WITH DIABETIC NEPHROPATHY, WITH LONG-TERM CURRENT USE OF INSULIN: ICD-10-CM

## 2019-08-21 DIAGNOSIS — Z79.4 TYPE 2 DIABETES MELLITUS WITH HYPERGLYCEMIA, WITH LONG-TERM CURRENT USE OF INSULIN: ICD-10-CM

## 2019-08-21 PROCEDURE — 99999 PR PBB SHADOW E&M-EST. PATIENT-LVL II: CPT | Mod: PBBFAC,,,

## 2019-08-21 PROCEDURE — 99999 PR PBB SHADOW E&M-EST. PATIENT-LVL II: ICD-10-PCS | Mod: PBBFAC,,,

## 2019-08-21 RX ORDER — INSULIN ASPART 100 [IU]/ML
4 INJECTION, SOLUTION INTRAVENOUS; SUBCUTANEOUS
Qty: 15 ML | Refills: 12
Start: 2019-08-21 | End: 2019-11-06

## 2019-08-21 RX ORDER — INSULIN DEGLUDEC 100 U/ML
24 INJECTION, SOLUTION SUBCUTANEOUS DAILY
Qty: 10 SYRINGE | Refills: 3
Start: 2019-08-21 | End: 2019-10-17 | Stop reason: SDUPTHER

## 2019-08-21 NOTE — TELEPHONE ENCOUNTER
CGMS reviewed  bg log does not match sensor report even remotely.   Her A1C is reflective of the sensor report.  All bg readings are grossly elevated on sesnor.   It does not appear as if she is taking any of her recommend insulin doses??  Please notify patient to:  Increase Tresiba to 24 units qhs, Increase Novolog to 8 units with each meal  Please fax [employer] form for new meter and supplies- check 4 times a day, new log in 1 week

## 2019-08-21 NOTE — TELEPHONE ENCOUNTER
----- Message from Irving Bains sent at 8/21/2019 12:37 PM CDT -----  Contact: Lisy - [place]  Type:  Pharmacy Calling to Clarify an RX    Name of Caller:  Lisy  Pharmacy Name:  [facility]  Prescription Name:  tiotropium (SPIRIVA WITH HANDIHALER) 18 mcg inhalation capsule  What do they need to clarify?: verify if prior authorization was received by the office from the pharmacy  Best Call Back Number:  [phone]  Additional Information:  Reference Key: [id number]

## 2019-08-26 ENCOUNTER — CLINICAL SUPPORT (OUTPATIENT)
Dept: DIABETES | Facility: CLINIC | Age: 59
End: 2019-08-26
Payer: MEDICARE

## 2019-08-26 ENCOUNTER — TELEPHONE (OUTPATIENT)
Dept: ENDOCRINOLOGY | Facility: CLINIC | Age: 59
End: 2019-08-26

## 2019-08-26 DIAGNOSIS — E11.21 TYPE 2 DIABETES MELLITUS WITH DIABETIC NEPHROPATHY, WITH LONG-TERM CURRENT USE OF INSULIN: ICD-10-CM

## 2019-08-26 DIAGNOSIS — J44.9 CHRONIC OBSTRUCTIVE PULMONARY DISEASE, UNSPECIFIED COPD TYPE: ICD-10-CM

## 2019-08-26 DIAGNOSIS — Z79.4 TYPE 2 DIABETES MELLITUS WITH DIABETIC NEPHROPATHY, WITH LONG-TERM CURRENT USE OF INSULIN: ICD-10-CM

## 2019-08-26 PROCEDURE — 99999 PR PBB SHADOW E&M-EST. PATIENT-LVL III: ICD-10-PCS | Mod: PBBFAC,,,

## 2019-08-26 PROCEDURE — 99999 PR PBB SHADOW E&M-EST. PATIENT-LVL III: CPT | Mod: PBBFAC,,,

## 2019-08-26 PROCEDURE — G0108 PR DIAB MANAGE TRN  PER INDIV: ICD-10-PCS | Mod: S$GLB,,, | Performed by: DIETITIAN, REGISTERED

## 2019-08-26 PROCEDURE — G0108 DIAB MANAGE TRN  PER INDIV: HCPCS | Mod: S$GLB,,, | Performed by: DIETITIAN, REGISTERED

## 2019-08-26 RX ORDER — TIOTROPIUM BROMIDE 18 UG/1
18 CAPSULE ORAL; RESPIRATORY (INHALATION) DAILY
Qty: 90 CAPSULE | Refills: 3 | Status: SHIPPED | OUTPATIENT
Start: 2019-08-26 | End: 2019-08-30

## 2019-08-26 NOTE — TELEPHONE ENCOUNTER
----- Message from Arelis Rasmussen sent at 8/26/2019 12:12 PM CDT -----  Contact: Self   Patient came in today to ask for a refill for her breathing medication. She said that Dr. Ren needs to authorize it in order for her to get it. Please call the patient whenever this is done.

## 2019-08-26 NOTE — TELEPHONE ENCOUNTER
Called [facility] and informed the representative that we were refilling the patients tiotropium medication as a courtesy and will have to have her PCP do a prior authorization on the tiotropium medication if needed. Called patient and informed. Patient verbalized understanding.

## 2019-08-26 NOTE — TELEPHONE ENCOUNTER
----- Message from Shantel Calderón RN sent at 8/23/2019  4:06 PM CDT -----  Contact: Ilsy with [facility]      ----- Message -----  From: Yazan Ham  Sent: 8/23/2019  11:27 AM  To: Colin TIERNEY Staff    Type:  Prior Authorization Status     Name of Caller:  Lisy  Pharmacy Name:  [facility]  Prescription Name:  tiotropium (SPIRIVA WITH HANDIHALER) 18 mcg inhalation capsule  What do they need to clarify?: verify if prior authorization was received by the office from the pharmacy  Best Call Back Number:  [phone]  Additional Information:  Reference Key: [id number]

## 2019-08-27 VITALS — WEIGHT: 133.38 LBS | BODY MASS INDEX: 26.89 KG/M2 | HEIGHT: 59 IN

## 2019-08-27 NOTE — PROGRESS NOTES
Diabetes Education  Author: Sabine Pa RD, CDE  Date: 8/27/2019    Diabetes Care Management Summary  Diabetes Education Record Assessment/Progress: Initial  Current Diabetes Risk Level: High     Last A1c:   Lab Results   Component Value Date    HGBA1C 12.0 (H) 07/03/2019     Last Visit with Diabetes Educator: : 08/26/2019  Last OPCM Referral: : 01/19/2018   Enrolled in OPC: No      Diabetes Type  Diabetes Type : Type II    Diabetes History  Diabetes Diagnosis: 5-10 years  Current Treatment: Insulin(24 Units of Tresba at bedtime with 4 Units of Novolog with meals)  Reviewed Problem List with Patient: No    Health Maintenance was reviewed today with patient. Discussed with patient importance of routine eye exams, foot exams/foot care, blood work (i.e.: A1c, microalbumin, and lipid), dental visits, yearly flu vaccine, and pneumonia vaccine as indicated by PCP. Patient verbalized understanding.     Health Maintenance Topics with due status: Not Due       Topic Last Completion Date    Colonoscopy 04/11/2017    Mammogram 12/13/2017    Influenza Vaccine 09/07/2018    Lipid Panel 03/27/2019    Hemoglobin A1c 07/03/2019     Health Maintenance Due   Topic Date Due    TETANUS VACCINE  01/08/1976    Low Dose Statin  01/08/1979    Foot Exam  06/18/2019    Eye Exam  09/19/2019       Nutrition  Meal Planning: 3 meals per day, drinks regular soda, water, snacks between meal  What type of sweetener do you use?: none  What type of beverages do you drink?: regular soda/tea, water  Meal Plan 24 Hour Recall - Breakfast: (Paxton Charms cereal and coffee - eating peppermint in office during visit)  Meal Plan 24 Hour Recall - Lunch: (Boiled chicken with macaroni and cheese and water)  Meal Plan 24 Hour Recall - Dinner: (Lsat night had boiled chicken)    Monitoring   Monitoring: Other  Self Monitoring : (Reports fasting this am was 166, before lunch 269 did not test before dinner and bedtime was 240)  Blood Glucose Logs: No  Do  you use a personal continuous glucose monitor?: No  In the last month, how often have you had a low blood sugar reaction?: never  Can you tell when your blood sugar is too high?: no    Exercise   Exercise Type: none    Current Diabetes Treatment   Current Treatment: Insulin(24 Units of Tresba at bedtime with 4 Units of Novolog with meals)    Social History  Preferred Learning Method: Face to Face  Primary Support: Self  Educational Level: High School  Occupation: (Does not work but takes care of her 3 y/o grandson)  Smoking Status: Never a Smoker  Alcohol Use: Never      Barriers to Change  Barriers to Change: None  Learning Challenges : None    Readiness to Learn   Readiness to Learn : Acceptance    Cultural Influences  Cultural Influences: None    Diabetes Education Assessment/Progress  Diabetes Disease Process (diabetes disease process and treatment options): Discussion  Nutrition (Incorporating nutritional management into one's lifestyle): Discussion, Instructed, Demonstrates Understanding/Competency (verbalizes/demonstrates), Comprehends Key Points, Written Materials Provided(Educated patient on importance of monitoring carb intake; long discussion on not consuming beverages with sugar and not consuming grandsons snacks)  Physical Activity (incorporating physical activity into one's lifestyle): Discussion  Medications (states correct name, dose, onset, peak, duration, side effects & timing of meds): Discussion, Instructed, Demonstrates Understanding/Competency(verbalizes/demonstrates), Comprehends Key Points, Written Materials Provided(Did not titrate insulin as patient agreed to work on nutrition first.  Instructed patient to report blood sugars back in one week)  Monitoring (monitoring blood glucose/other parameters & using results): Discussion, Instructed, Demonstrates Understanding/Competency (verbalizes/demonstrates), Comprehends Key Points, Written Materials Provided(Log sheet provided to record results  and report back in one week)  Acute Complications (preventing, detecting, and treating acute complications): Discussion  Chronic Complications (preventing, detecting, and treating chronic complications): Discussion  Clinical (diabetes, other pertinent medical history, and relevant comorbidities reviewed during visit): Discussion  Cognitive (knowledge of self-management skills, functional health literacy): Discussion  Psychosocial (emotional response to diabetes): Discussion  Diabetes Distress and Support Systems: Discussion  Behavioral (readiness for change, lifestyle practices, self-care behaviors): Discussion    Goals  Patient has selected/evaluated goals during today's session: Yes, selected  Healthy Eating: Set  Start Date: 08/26/19  Target Date: 11/26/19  Physical Activity: Set  Start Date: 08/26/19  Target Date: 11/26/19  Monitoring: Set  Start Date: 08/26/19  Target Date: 11/26/19  Medications: In Progress  Problem Solving: In Progress  Healthy Coping: In Progress  Reducing Risks: In Progress    Diabetes Care Plan/Intervention  Education Plan/Intervention: Individual Follow-Up DSMT    Diabetes Meal Plan  Restrictions: Restricted Carbohydrate(Stop drinking beverages with sugar)  Calories: 1500  Carbohydrate Per Meal: 30-45g  Carbohydrate Per Snack : 7-15g    Today's Self-Management Care Plan was developed with the patient's input and is based on barriers identified during today's assessment.    The long and short-term goals in the care plan were written with the patient/caregiver's input. The patient has agreed to work toward these goals to improve her overall diabetes control.      The patient received a copy of today's self-management plan and verbalized understanding of the care plan, goals, and all of today's instructions.      The patient was encouraged to communicate with her physician and care team regarding her condition(s) and treatment.  I provided the patient with my contact information today and  encouraged her to contact me via phone or patient portal as needed.     Education Units of Time   Time Spent: 60 min

## 2019-09-09 ENCOUNTER — OFFICE VISIT (OUTPATIENT)
Dept: OPHTHALMOLOGY | Facility: CLINIC | Age: 59
End: 2019-09-09
Payer: MEDICARE

## 2019-09-09 DIAGNOSIS — E11.3299 POORLY CONTROLLED TYPE 2 DIABETES MELLITUS WITH MILD NONPROLIFERATIVE RETINOPATHY: Primary | ICD-10-CM

## 2019-09-09 DIAGNOSIS — E11.65 POORLY CONTROLLED TYPE 2 DIABETES MELLITUS WITH MILD NONPROLIFERATIVE RETINOPATHY: Primary | ICD-10-CM

## 2019-09-09 DIAGNOSIS — H25.13 NUCLEAR SCLEROSIS, BILATERAL: ICD-10-CM

## 2019-09-09 DIAGNOSIS — H10.13 ALLERGIC CONJUNCTIVITIS OF BOTH EYES: ICD-10-CM

## 2019-09-09 DIAGNOSIS — H52.7 REFRACTIVE ERROR: ICD-10-CM

## 2019-09-09 PROCEDURE — 92015 DETERMINE REFRACTIVE STATE: CPT | Mod: S$GLB,,, | Performed by: OPHTHALMOLOGY

## 2019-09-09 PROCEDURE — 92014 COMPRE OPH EXAM EST PT 1/>: CPT | Mod: S$GLB,,, | Performed by: OPHTHALMOLOGY

## 2019-09-09 PROCEDURE — 99999 PR PBB SHADOW E&M-EST. PATIENT-LVL IV: ICD-10-PCS | Mod: PBBFAC,,, | Performed by: OPHTHALMOLOGY

## 2019-09-09 PROCEDURE — 92014 PR EYE EXAM, EST PATIENT,COMPREHESV: ICD-10-PCS | Mod: S$GLB,,, | Performed by: OPHTHALMOLOGY

## 2019-09-09 PROCEDURE — 99999 PR PBB SHADOW E&M-EST. PATIENT-LVL IV: CPT | Mod: PBBFAC,,, | Performed by: OPHTHALMOLOGY

## 2019-09-09 PROCEDURE — 99499 UNLISTED E&M SERVICE: CPT | Mod: S$GLB,,, | Performed by: OPHTHALMOLOGY

## 2019-09-09 PROCEDURE — 92015 PR REFRACTION: ICD-10-PCS | Mod: S$GLB,,, | Performed by: OPHTHALMOLOGY

## 2019-09-09 PROCEDURE — 99499 RISK ADDL DX/OHS AUDIT: ICD-10-PCS | Mod: S$GLB,,, | Performed by: OPHTHALMOLOGY

## 2019-09-09 NOTE — PATIENT INSTRUCTIONS
What Is Diabetic Retinopathy?  Diabetic retinopathy is the leading cause of blindness in adults. It happens when diabetes harms blood vessels inside the eye. These weak vessels leak fluid into an area of the eye called the retina. Weak new vessels can break and bleed into the retina. Old vessels can leak and cause swelling. These vessels can damage areas of the retina, causing blurry, distorted vision.    What causes diabetic retinopathy?  Diabetes is the cause of this eye disease. Over time, diabetes makes blood vessels weaken all over the body, including in the eyes. Poor blood sugar control can make retinopathy worse. So can smoking, high cholesterol, or poorly controlled high blood pressure. Pregnancy can also cause retinopathy to worsen. Diabetic retinopathy happens more often in Hispanics and in  Americans.   What are the symptoms?  You can have diabetic retinopathy without knowing it. Usually, there is no pain and no outward sign. Over time, you may notice gradual blurring or some vision loss. Some people have trouble seeing at night. or see spots or floaters. Symptoms may come and go. Early treatment and good control of risk factors may help prevent vision loss or blindness.  What you can do  Have your eyes examined regularly by an eye specialist. Your healthcare provider will tell you when and how often you need these exams. You can also help control your diabetes through exercise, diet, and medicine, as instructed by your healthcare provider. These same steps may also help control diabetic retinopathy.  Date Last Reviewed: 6/1/2016  © 6943-1175 [employer]. [address] [place] [address], [place] [address]. All rights reserved. This information is not intended as a substitute for professional medical care. Always follow your healthcare professional's instructions.

## 2019-09-09 NOTE — LETTER
September 9, 2019      Cassidy Shaw, BRY, NP  [address] [facility] [address]           [address]  [facility] [address]  Phone: [phone]          Patient: Steff Johnson   MR Number: [id number]   YOB: 1960   Date of Visit: 9/9/2019       Dear Cassidy Shaw:    Thank you for referring Steff Johnson to me for evaluation. Attached you will find relevant portions of my assessment and plan of care.    If you have questions, please do not hesitate to call me. I look forward to following Steff Johnson along with you.    Sincerely,    Chloé Rousseau MD    Enclosure  CC:  No Recipients    If you would like to receive this communication electronically, please contact [email or web address] or [phone] to request more information on [employer] access.    For providers and/or their staff who would like to refer a patient to [facility], please contact us through our one-stop-shop provider referral line, [facility], at [phone].    If you feel you have received this communication in error or would no longer like to receive these types of communications, please e-mail [email or web address]

## 2019-09-09 NOTE — PROGRESS NOTES
HPI     60 YO female presents today for an annual eye exam. She states she needs   a new glasses prescription she feels that her one from last year is not   working as well as it used to. Also notes eyes itch constantly.     Lab Results       Component                Value               Date                       HGBA1C                   12.0 (H)            07/03/2019                 HGBA1C                   10.0 (H)            03/27/2019                 HGBA1C                   11.0 (H)            01/02/2019            No results found for: LABA1C    LBSL 169 this am      Last edited by Chloé Rousseau MD on 9/9/2019 11:10 AM.   (History)        ROS     Positive for: Neurological (neuropathy), Genitourinary (sees nephrology),   Endocrine (DM diagnosed around 2010), Cardiovascular (HTN - 200's/100s   about 2 weeks ago, now back down)    Negative for: Eyes (denies surgery/laser/trauma), Respiratory (denies   asthma)    Last edited by Chloé Rousseau MD on 9/9/2019 11:15 AM.   (History)        Assessment /Plan     For exam results, see Encounter Report.    Poorly controlled type 2 diabetes mellitus with mild nonproliferative retinopathy    Nuclear sclerosis, bilateral    Allergic conjunctivitis of both eyes    Refractive error          Appears stable from prior exam. Continue to work on glucose control. F/u 1 year, sooner prn.    Not visually significant.  Observe.    Allergies - Zaditor is an over-the-counter allergy drop that used to be prescription only. Use 2x per day, if allergy symptoms do not improve, call or return to clinic for trial of prescription allergy drops    MRx given.

## 2019-09-30 ENCOUNTER — CLINICAL SUPPORT (OUTPATIENT)
Dept: DIABETES | Facility: CLINIC | Age: 59
End: 2019-09-30
Payer: MEDICARE

## 2019-09-30 VITALS — HEIGHT: 59 IN | BODY MASS INDEX: 26.89 KG/M2 | WEIGHT: 133.38 LBS

## 2019-09-30 DIAGNOSIS — E11.8 TYPE 2 DIABETES MELLITUS WITH COMPLICATION, WITH LONG-TERM CURRENT USE OF INSULIN: ICD-10-CM

## 2019-09-30 DIAGNOSIS — Z79.4 TYPE 2 DIABETES MELLITUS WITH COMPLICATION, WITH LONG-TERM CURRENT USE OF INSULIN: ICD-10-CM

## 2019-09-30 PROCEDURE — 99999 PR PBB SHADOW E&M-EST. PATIENT-LVL III: ICD-10-PCS | Mod: PBBFAC,,,

## 2019-09-30 PROCEDURE — 99999 PR PBB SHADOW E&M-EST. PATIENT-LVL III: CPT | Mod: PBBFAC,,,

## 2019-09-30 PROCEDURE — G0108 PR DIAB MANAGE TRN  PER INDIV: ICD-10-PCS | Mod: S$GLB,,, | Performed by: DIETITIAN, REGISTERED

## 2019-09-30 PROCEDURE — G0108 DIAB MANAGE TRN  PER INDIV: HCPCS | Mod: S$GLB,,, | Performed by: DIETITIAN, REGISTERED

## 2019-09-30 NOTE — PROGRESS NOTES
Diabetes Education  Author: Sabine Pa RD, CDE  Date: 9/30/2019    Diabetes Care Management Summary  Diabetes Education Record Assessment/Progress: Comprehensive/Group  Current Diabetes Risk Level: Moderate     Last A1c:   Lab Results   Component Value Date    HGBA1C 12.0 (H) 07/03/2019     Last visit with Diabetes Educator: : 09/30/2019      Diabetes Type  Diabetes Type : Type II    Diabetes History  Diabetes Diagnosis: >10 years  Current Treatment: Insulin(28-30 Units Tresiba at bedtime with 4 units Novolog with sliding scale with meals)  Reviewed Problem List with Patient: Yes    Health Maintenance was reviewed today with patient. Discussed with patient importance of routine eye exams, foot exams/foot care, blood work (i.e.: A1c, microalbumin, and lipid), dental visits, yearly flu vaccine, and pneumonia vaccine as indicated by PCP. Patient verbalized understanding.     Health Maintenance Topics with due status: Not Due       Topic Last Completion Date    Colonoscopy 04/11/2017    Mammogram 12/13/2017    Lipid Panel 03/27/2019    Eye Exam 09/09/2019     Health Maintenance Due   Topic Date Due    TETANUS VACCINE  01/08/1976    Low Dose Statin  01/08/1979    Foot Exam  06/18/2019    Hemoglobin A1c  10/03/2019       Nutrition  Meal Planning: 3 meals per day, water(Patient has made improvement to diet and is no longer drinking regular soft drinks)  What type of sweetener do you use?: sugar  What type of beverages do you drink?: water, diet soda/tea  Meal Plan 24 Hour Recall - Breakfast: (Sausage biscuit with water sometime coffee sweetened with sugar )  Meal Plan 24 Hour Recall - Lunch: ([place] with water)  Meal Plan 24 Hour Recall - Dinner: (Last night had macaroni and cheese with BBQ Chicken and water)  Meal Plan 24 Hour Recall - Snack: (Not snacking)    Monitoring   Monitoring: Other  Blood Glucose Logs: Yes  Do you use a personal continuous glucose monitor?: No  In the last month, how often have you  had a low blood sugar reaction?: never  Can you tell when your blood sugar is too high?: no        Exercise   Exercise Type: none(Admits she needs to start walking)    Current Diabetes Treatment   Current Treatment: Insulin(28-30 Units Tresiba at bedtime with 4 units Novolog with sliding scale with meals)    Social History  Preferred Learning Method: Face to Face  Primary Support: Self, Family  Educational Level: High School  Smoking Status: Never a Smoker  Alcohol Use: Never      Today's Self-Management Care Plan was developed with the patient's input and is based on barriers identified during today's assessment.    The long and short-term goals in the care plan were written with the patient/caregiver's input. The patient has agreed to work toward these goals to improve her overall diabetes control.      The patient received a copy of today's self-management plan and verbalized understanding of the care plan, goals, and all of today's instructions.      The patient was encouraged to communicate with her physician and care team regarding her condition(s) and treatment.  I provided the patient with my contact information today and encouraged her to contact me via phone or patient portal as needed.

## 2019-10-03 ENCOUNTER — PATIENT OUTREACH (OUTPATIENT)
Dept: ADMINISTRATIVE | Facility: HOSPITAL | Age: 59
End: 2019-10-03

## 2019-10-05 ENCOUNTER — HOSPITAL ENCOUNTER (EMERGENCY)
Facility: HOSPITAL | Age: 59
Discharge: HOME | End: 2019-10-05
Attending: EMERGENCY MEDICINE
Payer: MEDICARE

## 2019-10-05 VITALS
HEART RATE: 72 BPM | HEIGHT: 59 IN | BODY MASS INDEX: 26.81 KG/M2 | OXYGEN SATURATION: 98 % | RESPIRATION RATE: 19 BRPM | SYSTOLIC BLOOD PRESSURE: 197 MMHG | TEMPERATURE: 98 F | WEIGHT: 133 LBS | DIASTOLIC BLOOD PRESSURE: 88 MMHG

## 2019-10-05 DIAGNOSIS — R21 RASH: ICD-10-CM

## 2019-10-05 DIAGNOSIS — I10 HYPERTENSION: ICD-10-CM

## 2019-10-05 DIAGNOSIS — L30.9 DERMATITIS: Primary | ICD-10-CM

## 2019-10-05 PROCEDURE — 93005 ELECTROCARDIOGRAM TRACING: CPT

## 2019-10-05 PROCEDURE — 99283 EMERGENCY DEPT VISIT LOW MDM: CPT | Mod: 25

## 2019-10-05 RX ORDER — HYDROCORTISONE 1 %
CREAM (GRAM) TOPICAL
Qty: 30 G | Refills: 0 | Status: SHIPPED | OUTPATIENT
Start: 2019-10-05 | End: 2019-12-12

## 2019-10-05 NOTE — ED PROVIDER NOTES
Encounter Date: 10/5/2019       History     Chief Complaint   Patient presents with    Rash     X 4 DAYS     59-year-old female presents complaining of itchy rash to her right arm.  Patient reports that there is known poison ivy growing by her gait, she reports that she has used the gait recently but she thought her grandson had removed all the poison ivy.  Patient reports after using the gate she developed itching to her right arm.  Patient has noted rash to her right arm.  Patient reports that application of topical Benadryl has helped alleviate the itching.  Patient reports that the rash is slowly fading but continues to itch.  Patient denies tenderness to palpation. Patient denies drainage.  Patient has no decreased range of motion.  Patient has no other complaints at this time.  She denies systemic symptoms such as fever or shortness of breath. Patient also reports that she has not taken her blood pressure medication in 3 days because she was worried about the rash, patient reports however that she has not started any new medications.        Review of patient's allergies indicates:  No Known Allergies  Past Medical History:   Diagnosis Date    Arthritis     COPD (chronic obstructive pulmonary disease)     Diabetes mellitus     Hypertension     Wears glasses      Past Surgical History:   Procedure Laterality Date    COLONOSCOPY N/A 4/11/2017    Procedure: COLONOSCOPY;  Surgeon: Jared Antonio MD;  Location: [facility];  Service: Endoscopy;  Laterality: N/A;    HYSTERECTOMY      OOPHORECTOMY      SHOULDER SURGERY      R     Family History   Problem Relation Age of Onset    Diabetes Mother     Asthma Mother     Hypertension Mother     Diabetes Father     Diabetes Brother     Hypertension Brother     Anemia Daughter      Social History     Tobacco Use    Smoking status: Never Smoker    Smokeless tobacco: Never Used   Substance Use Topics    Alcohol use: No    Drug use: No     Review of  Systems   Constitutional: Negative for fever.   HENT: Negative for congestion, rhinorrhea, sore throat and trouble swallowing.    Eyes: Negative for visual disturbance.   Respiratory: Negative for cough, chest tightness, shortness of breath and wheezing.    Cardiovascular: Negative for chest pain, palpitations and leg swelling.   Gastrointestinal: Negative for abdominal distention, abdominal pain, constipation, diarrhea, nausea and vomiting.   Genitourinary: Negative for difficulty urinating, dysuria, flank pain and frequency.   Musculoskeletal: Negative for arthralgias, back pain, joint swelling and neck pain.   Skin: Positive for rash. Negative for color change.   Neurological: Negative for dizziness, syncope, speech difficulty, weakness, numbness and headaches.   All other systems reviewed and are negative.      Physical Exam     Initial Vitals [10/05/19 1134]   BP Pulse Resp Temp SpO2   (!) 222/109 74 18 98.3 °F (36.8 °C) 98 %      MAP       --         Physical Exam    Nursing note and vitals reviewed.  Constitutional: She appears well-developed and well-nourished. She is not diaphoretic. No distress.   HENT:   Head: Normocephalic and atraumatic.   Right Ear: External ear normal.   Left Ear: External ear normal.   Nose: Nose normal.   Mouth/Throat: Oropharynx is clear and moist. No oropharyngeal exudate.   Eyes: Conjunctivae and EOM are normal. Pupils are equal, round, and reactive to light. Right eye exhibits no discharge. Left eye exhibits no discharge. No scleral icterus.   Neck: Normal range of motion. Neck supple. No thyromegaly present. No tracheal deviation present. No JVD present.   Cardiovascular: Normal rate, regular rhythm, normal heart sounds and intact distal pulses. Exam reveals no gallop and no friction rub.    No murmur heard.  Pulmonary/Chest: Breath sounds normal. No stridor. No respiratory distress. She has no wheezes. She has no rhonchi. She has no rales. She exhibits no tenderness.    Abdominal: Soft. Bowel sounds are normal. She exhibits no distension and no mass. There is no tenderness. There is no rebound and no guarding.   Musculoskeletal: Normal range of motion. She exhibits no edema or tenderness.   Lymphadenopathy:     She has no cervical adenopathy.   Neurological: She is alert and oriented to person, place, and time. She has normal strength. She displays normal reflexes. No cranial nerve deficit or sensory deficit.   Skin: Skin is warm and dry. Rash noted. No abscess noted. No erythema. No pallor.   Patient has patchy erythematous rash with mild excoriations and overlying dried skin secondary to patient scratching her arm.  This makes rash difficult to identify however it is broadly consistent with contact dermatitis.  There is no sign of superinfection there is no sign of drainage there is no sign of blistering or skin breakdown patient has normal capillary refill and pulses are 2+         ED Course   Procedures  Labs Reviewed - No data to display     ECG Results          EKG 12-lead (In process)  Result time 10/05/19 14:42:16    In process by Interface, Lab In [facility] (10/05/19 14:42:16)                 Narrative:    Test Reason : I10,    Vent. Rate : 066 BPM     Atrial Rate : 066 BPM     P-R Int : 154 ms          QRS Dur : 076 ms      QT Int : 452 ms       P-R-T Axes : 069 041 061 degrees     QTc Int : 473 ms    Normal sinus rhythm  Nonspecific T wave abnormality  Prolonged QT  Abnormal ECG  When compared with ECG of 09-MAR-2018 15:28,  Minimal criteria for Inferior infarct are no longer Present    Referred By: [email or web address]   SELF           Confirmed By:                   In process by Interface, Lab In [facility] (10/05/19 13:58:12)                 Narrative:    Test Reason : I10,    Vent. Rate : 066 BPM     Atrial Rate : 066 BPM     P-R Int : 154 ms          QRS Dur : 076 ms      QT Int : 452 ms       P-R-T Axes : 069 041 061 degrees     QTc Int : 473 ms    Normal sinus  rhythm  Nonspecific T wave abnormality  Prolonged QT  Abnormal ECG  When compared with ECG of 09-MAR-2018 15:28,  Minimal criteria for Inferior infarct are no longer Present    Referred By: AAAREFERR   SELF           Confirmed By:                             Imaging Results    None          Medical Decision Making:   History:   Old Medical Records: I decided to obtain old medical records.  Initial Assessment:   Urgent evaluation of a 59-year-old female presenting with rash to the right arm, rash extends from axilla down to hand.  No other areas of involvement noted, this rash does not appear to follow a dermatomal pattern, patient does report that she touched the gait which she suspects had been exposed to poison ivy with her right hand.  Patient is advised to continue using Benadryl cream, will additionally initiate treatment with hydrocortisone cream.  Patient to follow up with Dermatology in the next 2-3 days for re-evaluation and further management.  Patient cautioned return immediately to the ER for any worsening or for any further concerns.  Patient's blood pressure noted to be elevated, patient has not taken her medications in 3 days patient reports no symptoms of headache, chest pain, shortness of breath, swelling or any other acute abnormality.  The do not feel that patient's blood pressure medications to cause for this rash as it is not consistent with drug rash or hives.  Patient is advised to continue taking her blood pressure medication and she has taken a dose of this medication while in the emergency department.  When she follows up in clinic she is to have her blood pressure rechecked and re-evaluate her blood pressure regimen with her doctor.                      Clinical Impression:       ICD-10-CM ICD-9-CM   1. Dermatitis L30.9 692.9   2. Rash R21 782.1   3. Hypertension I10 401.9                                Jose A Houston MD  10/05/19 2154

## 2019-10-05 NOTE — ED NOTES
Pt stated she has not taken her B/P meds in 3 days due to possible allergic reaction. Pt advised to take her meds as prescribed starting right now.

## 2019-10-17 ENCOUNTER — LAB VISIT (OUTPATIENT)
Dept: LAB | Facility: HOSPITAL | Age: 59
End: 2019-10-17
Attending: FAMILY MEDICINE
Payer: MEDICARE

## 2019-10-17 ENCOUNTER — TELEPHONE (OUTPATIENT)
Dept: NEPHROLOGY | Facility: CLINIC | Age: 59
End: 2019-10-17

## 2019-10-17 ENCOUNTER — OFFICE VISIT (OUTPATIENT)
Dept: FAMILY MEDICINE | Facility: CLINIC | Age: 59
End: 2019-10-17
Payer: MEDICARE

## 2019-10-17 VITALS
HEIGHT: 59 IN | BODY MASS INDEX: 27.47 KG/M2 | WEIGHT: 136.25 LBS | SYSTOLIC BLOOD PRESSURE: 160 MMHG | OXYGEN SATURATION: 98 % | DIASTOLIC BLOOD PRESSURE: 100 MMHG | TEMPERATURE: 98 F | RESPIRATION RATE: 12 BRPM | HEART RATE: 76 BPM

## 2019-10-17 DIAGNOSIS — E78.2 MIXED HYPERLIPIDEMIA: ICD-10-CM

## 2019-10-17 DIAGNOSIS — E11.65 TYPE 2 DIABETES MELLITUS WITH HYPERGLYCEMIA, WITH LONG-TERM CURRENT USE OF INSULIN: ICD-10-CM

## 2019-10-17 DIAGNOSIS — E11.59 HYPERTENSION ASSOCIATED WITH DIABETES: Primary | ICD-10-CM

## 2019-10-17 DIAGNOSIS — I10 UNCONTROLLED HYPERTENSION: ICD-10-CM

## 2019-10-17 DIAGNOSIS — R80.1 PERSISTENT PROTEINURIA: ICD-10-CM

## 2019-10-17 DIAGNOSIS — E55.9 VITAMIN D DEFICIENCY: ICD-10-CM

## 2019-10-17 DIAGNOSIS — I10 HYPERTENSION, UNCONTROLLED: ICD-10-CM

## 2019-10-17 DIAGNOSIS — Z79.4 TYPE 2 DIABETES MELLITUS WITH HYPERGLYCEMIA, WITH LONG-TERM CURRENT USE OF INSULIN: ICD-10-CM

## 2019-10-17 DIAGNOSIS — Z79.4 TYPE 2 DIABETES MELLITUS WITH DIABETIC NEPHROPATHY, WITH LONG-TERM CURRENT USE OF INSULIN: ICD-10-CM

## 2019-10-17 DIAGNOSIS — I15.2 HYPERTENSION ASSOCIATED WITH DIABETES: Primary | ICD-10-CM

## 2019-10-17 DIAGNOSIS — L30.9 ECZEMA, UNSPECIFIED TYPE: ICD-10-CM

## 2019-10-17 DIAGNOSIS — Z23 FLU VACCINE NEED: ICD-10-CM

## 2019-10-17 DIAGNOSIS — E11.21 TYPE 2 DIABETES MELLITUS WITH DIABETIC NEPHROPATHY, WITH LONG-TERM CURRENT USE OF INSULIN: ICD-10-CM

## 2019-10-17 LAB
25(OH)D3+25(OH)D2 SERPL-MCNC: 26 NG/ML (ref 30–96)
ALBUMIN SERPL BCP-MCNC: 3.7 G/DL (ref 3.5–5.2)
ALP SERPL-CCNC: 95 U/L (ref 55–135)
ALT SERPL W/O P-5'-P-CCNC: 14 U/L (ref 10–44)
ANION GAP SERPL CALC-SCNC: 9 MMOL/L (ref 8–16)
AST SERPL-CCNC: 17 U/L (ref 10–40)
BASOPHILS # BLD AUTO: 0.07 K/UL (ref 0–0.2)
BASOPHILS NFR BLD: 0.7 % (ref 0–1.9)
BILIRUB SERPL-MCNC: 0.5 MG/DL (ref 0.1–1)
BUN SERPL-MCNC: 15 MG/DL (ref 8–23)
CALCIUM SERPL-MCNC: 9.2 MG/DL (ref 8.7–10.5)
CHLORIDE SERPL-SCNC: 104 MMOL/L (ref 95–110)
CHOLEST SERPL-MCNC: 184 MG/DL (ref 120–199)
CHOLEST/HDLC SERPL: 2.5 {RATIO} (ref 2–5)
CO2 SERPL-SCNC: 27 MMOL/L (ref 23–29)
CREAT SERPL-MCNC: 1 MG/DL (ref 0.5–1.4)
DIFFERENTIAL METHOD: ABNORMAL
EOSINOPHIL # BLD AUTO: 0.2 K/UL (ref 0–0.5)
EOSINOPHIL NFR BLD: 1.7 % (ref 0–8)
ERYTHROCYTE [DISTWIDTH] IN BLOOD BY AUTOMATED COUNT: 13.9 % (ref 11.5–14.5)
EST. GFR  (AFRICAN AMERICAN): >60 ML/MIN/1.73 M^2
EST. GFR  (NON AFRICAN AMERICAN): >60 ML/MIN/1.73 M^2
ESTIMATED AVG GLUCOSE: 249 MG/DL (ref 68–131)
GLUCOSE SERPL-MCNC: 245 MG/DL (ref 70–110)
HBA1C MFR BLD HPLC: 10.3 % (ref 4–5.6)
HCT VFR BLD AUTO: 37.1 % (ref 37–48.5)
HDLC SERPL-MCNC: 75 MG/DL (ref 40–75)
HDLC SERPL: 40.8 % (ref 20–50)
HGB BLD-MCNC: 12.5 G/DL (ref 12–16)
IMM GRANULOCYTES # BLD AUTO: 0.1 K/UL (ref 0–0.04)
IMM GRANULOCYTES NFR BLD AUTO: 1 % (ref 0–0.5)
LDLC SERPL CALC-MCNC: 99.4 MG/DL (ref 63–159)
LYMPHOCYTES # BLD AUTO: 2.3 K/UL (ref 1–4.8)
LYMPHOCYTES NFR BLD: 22.6 % (ref 18–48)
MCH RBC QN AUTO: 27.2 PG (ref 27–31)
MCHC RBC AUTO-ENTMCNC: 33.7 G/DL (ref 32–36)
MCV RBC AUTO: 81 FL (ref 82–98)
MONOCYTES # BLD AUTO: 0.8 K/UL (ref 0.3–1)
MONOCYTES NFR BLD: 8.2 % (ref 4–15)
NEUTROPHILS # BLD AUTO: 6.8 K/UL (ref 1.8–7.7)
NEUTROPHILS NFR BLD: 65.8 % (ref 38–73)
NONHDLC SERPL-MCNC: 109 MG/DL
NRBC BLD-RTO: 0 /100 WBC
PLATELET # BLD AUTO: 404 K/UL (ref 150–350)
PMV BLD AUTO: 10.6 FL (ref 9.2–12.9)
POTASSIUM SERPL-SCNC: 4.5 MMOL/L (ref 3.5–5.1)
PROT SERPL-MCNC: 7.9 G/DL (ref 6–8.4)
RBC # BLD AUTO: 4.6 M/UL (ref 4–5.4)
SODIUM SERPL-SCNC: 140 MMOL/L (ref 136–145)
TRIGL SERPL-MCNC: 48 MG/DL (ref 30–150)
WBC # BLD AUTO: 10.25 K/UL (ref 3.9–12.7)

## 2019-10-17 PROCEDURE — 3008F BODY MASS INDEX DOCD: CPT | Mod: CPTII,S$GLB,, | Performed by: FAMILY MEDICINE

## 2019-10-17 PROCEDURE — 3046F HEMOGLOBIN A1C LEVEL >9.0%: CPT | Mod: CPTII,S$GLB,, | Performed by: FAMILY MEDICINE

## 2019-10-17 PROCEDURE — 82306 VITAMIN D 25 HYDROXY: CPT

## 2019-10-17 PROCEDURE — 3077F SYST BP >= 140 MM HG: CPT | Mod: CPTII,S$GLB,, | Performed by: FAMILY MEDICINE

## 2019-10-17 PROCEDURE — 3080F DIAST BP >= 90 MM HG: CPT | Mod: CPTII,S$GLB,, | Performed by: FAMILY MEDICINE

## 2019-10-17 PROCEDURE — 3080F PR MOST RECENT DIASTOLIC BLOOD PRESSURE >= 90 MM HG: ICD-10-PCS | Mod: CPTII,S$GLB,, | Performed by: FAMILY MEDICINE

## 2019-10-17 PROCEDURE — 99499 UNLISTED E&M SERVICE: CPT | Mod: S$GLB,,, | Performed by: FAMILY MEDICINE

## 2019-10-17 PROCEDURE — 3008F PR BODY MASS INDEX (BMI) DOCUMENTED: ICD-10-PCS | Mod: CPTII,S$GLB,, | Performed by: FAMILY MEDICINE

## 2019-10-17 PROCEDURE — 99999 PR PBB SHADOW E&M-EST. PATIENT-LVL V: ICD-10-PCS | Mod: PBBFAC,,, | Performed by: FAMILY MEDICINE

## 2019-10-17 PROCEDURE — 99999 PR PBB SHADOW E&M-EST. PATIENT-LVL V: CPT | Mod: PBBFAC,,, | Performed by: FAMILY MEDICINE

## 2019-10-17 PROCEDURE — 36415 COLL VENOUS BLD VENIPUNCTURE: CPT | Mod: PO

## 2019-10-17 PROCEDURE — 99214 PR OFFICE/OUTPT VISIT, EST, LEVL IV, 30-39 MIN: ICD-10-PCS | Mod: S$GLB,,, | Performed by: FAMILY MEDICINE

## 2019-10-17 PROCEDURE — 80053 COMPREHEN METABOLIC PANEL: CPT

## 2019-10-17 PROCEDURE — 99499 RISK ADDL DX/OHS AUDIT: ICD-10-PCS | Mod: S$GLB,,, | Performed by: FAMILY MEDICINE

## 2019-10-17 PROCEDURE — 83036 HEMOGLOBIN GLYCOSYLATED A1C: CPT

## 2019-10-17 PROCEDURE — 85025 COMPLETE CBC W/AUTO DIFF WBC: CPT

## 2019-10-17 PROCEDURE — 99214 OFFICE O/P EST MOD 30 MIN: CPT | Mod: S$GLB,,, | Performed by: FAMILY MEDICINE

## 2019-10-17 PROCEDURE — 3046F PR MOST RECENT HEMOGLOBIN A1C LEVEL > 9.0%: ICD-10-PCS | Mod: CPTII,S$GLB,, | Performed by: FAMILY MEDICINE

## 2019-10-17 PROCEDURE — 3077F PR MOST RECENT SYSTOLIC BLOOD PRESSURE >= 140 MM HG: ICD-10-PCS | Mod: CPTII,S$GLB,, | Performed by: FAMILY MEDICINE

## 2019-10-17 PROCEDURE — 80061 LIPID PANEL: CPT

## 2019-10-17 RX ORDER — AMLODIPINE AND VALSARTAN 10; 320 MG/1; MG/1
1 TABLET ORAL DAILY
Qty: 90 TABLET | Refills: 3 | Status: SHIPPED | OUTPATIENT
Start: 2019-10-17 | End: 2021-02-11 | Stop reason: SDUPTHER

## 2019-10-17 RX ORDER — METOPROLOL SUCCINATE 200 MG/1
200 TABLET, EXTENDED RELEASE ORAL DAILY
Qty: 90 TABLET | Refills: 3 | Status: SHIPPED | OUTPATIENT
Start: 2019-10-17 | End: 2021-02-11 | Stop reason: SDUPTHER

## 2019-10-17 RX ORDER — TRIAMCINOLONE ACETONIDE 1 MG/ML
LOTION TOPICAL 2 TIMES DAILY
Qty: 240 ML | Refills: 0 | Status: SHIPPED | OUTPATIENT
Start: 2019-10-17 | End: 2019-12-12

## 2019-10-17 RX ORDER — INSULIN DEGLUDEC 100 U/ML
35 INJECTION, SOLUTION SUBCUTANEOUS DAILY
Qty: 11 SYRINGE | Refills: 3 | Status: SHIPPED | OUTPATIENT
Start: 2019-10-17 | End: 2019-11-06

## 2019-10-17 NOTE — TELEPHONE ENCOUNTER
----- Message from Sharon Swanson sent at 10/17/2019 10:53 AM CDT -----  Pt established with dr murrell  Needs an appt for Uncontrolled hypertension  Type 2 diabetes mellitus with diabetic nephropathy, with long-term current use of insulin  Persistent proteinuria  Cannot wait till next year for an appt p/dr irene  Please call her @ [phone]  Thanks !

## 2019-10-17 NOTE — TELEPHONE ENCOUNTER
No answer at patient number.      Will message Dr Ren' staff to see if there are any recent labs for Dr Swanson to review in Dr Echols's absence.

## 2019-10-17 NOTE — TELEPHONE ENCOUNTER
Spoke with patient who states she has no labs since July, but did have a draw today.  Advised will send to Dr Swanson to review and advise when she needs to be seen.

## 2019-10-17 NOTE — PROGRESS NOTES
Subjective:       Patient ID: Steff Johnson is a 59 y.o. female.    Chief Complaint: Follow-up (6mth f/u hypertension  /  diabetes)    HPI  Review of Systems   Constitutional: Negative for fatigue and unexpected weight change.   Respiratory: Negative for chest tightness and shortness of breath.    Cardiovascular: Negative for chest pain, palpitations and leg swelling.   Gastrointestinal: Negative for abdominal pain.   Musculoskeletal: Negative for arthralgias.   Neurological: Negative for dizziness, syncope, light-headedness and headaches.       Patient Active Problem List   Diagnosis    Hypertension associated with diabetes    COPD (chronic obstructive pulmonary disease)    Type 2 diabetes mellitus with hyperglycemia, with long-term current use of insulin    Proteinuria    Complete tear of left rotator cuff    Left shoulder pain    Shoulder stiffness, left    Shoulder weakness    Type 2 diabetes mellitus with diabetic nephropathy    Mixed hyperlipidemia    Continuous opioid dependence    Type 2 diabetes mellitus with both eyes affected by moderate nonproliferative retinopathy without macular edema, with long-term current use of insulin     Patient is here for a chronic conditions follow up.    Endocrine Dr. Blanco-poorly controlled DM due to NC , vit d def  Ophth Dr. Rousseau Complications from DM-prolif retinopathy and nephropathy    Nephro Dr. Echols CKD , malignant HTN     Has appt 11/19 with endocrine  Objective:      Physical Exam   Constitutional: She is oriented to person, place, and time. She appears well-developed and well-nourished.   Cardiovascular: Normal rate, regular rhythm and normal heart sounds.   Pulmonary/Chest: Effort normal and breath sounds normal.   Musculoskeletal: She exhibits no edema.   Neurological: She is alert and oriented to person, place, and time.   Skin: Skin is warm and dry.   Psychiatric: She has a normal mood and affect.   Nursing note and vitals reviewed.       Assessment:       1. Hypertension associated with diabetes    2. Uncontrolled hypertension    3. Type 2 diabetes mellitus with hyperglycemia, with long-term current use of insulin    4. Hypertension, uncontrolled    5. Mixed hyperlipidemia    6. Vitamin D deficiency    7. Flu vaccine need    8. Eczema, unspecified type    9. Type 2 diabetes mellitus with diabetic nephropathy, with long-term current use of insulin    10. Persistent proteinuria        Plan:         1. Uncontrolled hypertension  Increase to  - metoprolol succinate (TOPROL-XL) 200 MG 24 hr tablet; Take 1 tablet (200 mg total) by mouth once daily.  Dispense: 90 tablet; Refill: 3  - Ambulatory referral to Nephrology    2. Type 2 diabetes mellitus with hyperglycemia, with long-term current use of insulin  Increase  - insulin degludec (TRESIBA FLEXTOUCH U-100) 100 unit/mL (3 mL) InPn; Inject 35 Units into the skin once daily.  Dispense: 11 Syringe; Refill: 3  - CBC auto differential; Future  - Comprehensive metabolic panel; Future  - Hemoglobin A1c; Future    3. Hypertension, uncontrolled  Continue other meds  - amlodipine-valsartan (EXFORGE)  mg per tablet; Take 1 tablet by mouth once daily.  Dispense: 90 tablet; Refill: 3    4. Hypertension associated with diabetes  Cont other meds and nephro consult    5. Mixed hyperlipidemia  Stable condition.  Continue current medications.  Will adjust based on lab findings or if condition changes.    - Lipid panel; Future    6. Vitamin D deficiency  Screen and treat as indicated:    - Vitamin D; Future    7. Flu vaccine need  Immunize today.  Counseled patient on risks, benefits and side effects.  Patient elected to proceed with vaccination.    - Influenza - Quadrivalent (PF)    8. Eczema, unspecified type  Use as needed  - triamcinolone acetonide 0.1% (KENALOG) 0.1 % Lotn; Apply topically 2 (two) times daily.  Dispense: 240 mL; Refill: 0    9. Type 2 diabetes mellitus with diabetic nephropathy, with long-term  current use of insulin  Stable condition.  Continue current medications.  Will adjust based on lab findings or if condition changes.    - Ambulatory referral to Nephrology    10. Persistent proteinuria  Refer back to  - Ambulatory referral to Nephrology        Time spent with patient: 20 minutes    Patient with be reevaluated in monthly x 6 months or sooner prn    Greater than 50% of this visit was spent counseling as described in above documentation:Yes

## 2019-10-17 NOTE — TELEPHONE ENCOUNTER
----- Message from Sharon Swanson sent at 10/17/2019 10:53 AM CDT -----  Pt established with dr murrell  Needs an appt for Uncontrolled hypertension  Type 2 diabetes mellitus with diabetic nephropathy, with long-term current use of insulin  Persistent proteinuria  Cannot wait till next year for an appt p/dr irene  Please call her @ [phone]  Thanks !

## 2019-10-17 NOTE — PATIENT INSTRUCTIONS

## 2019-10-24 ENCOUNTER — CLINICAL SUPPORT (OUTPATIENT)
Dept: DIABETES | Facility: CLINIC | Age: 59
End: 2019-10-24
Payer: MEDICARE

## 2019-10-24 VITALS — WEIGHT: 136.44 LBS | BODY MASS INDEX: 27.51 KG/M2 | HEIGHT: 59 IN

## 2019-10-24 DIAGNOSIS — E11.21 TYPE 2 DIABETES MELLITUS WITH DIABETIC NEPHROPATHY, WITH LONG-TERM CURRENT USE OF INSULIN: ICD-10-CM

## 2019-10-24 DIAGNOSIS — Z79.4 TYPE 2 DIABETES MELLITUS WITH DIABETIC NEPHROPATHY, WITH LONG-TERM CURRENT USE OF INSULIN: ICD-10-CM

## 2019-10-24 PROCEDURE — 99999 PR PBB SHADOW E&M-EST. PATIENT-LVL III: CPT | Mod: PBBFAC,,, | Performed by: DIETITIAN, REGISTERED

## 2019-10-24 PROCEDURE — 99999 PR PBB SHADOW E&M-EST. PATIENT-LVL III: ICD-10-PCS | Mod: PBBFAC,,, | Performed by: DIETITIAN, REGISTERED

## 2019-10-24 PROCEDURE — G0108 DIAB MANAGE TRN  PER INDIV: HCPCS | Mod: S$GLB,,, | Performed by: DIETITIAN, REGISTERED

## 2019-10-24 PROCEDURE — G0108 PR DIAB MANAGE TRN  PER INDIV: ICD-10-PCS | Mod: S$GLB,,, | Performed by: DIETITIAN, REGISTERED

## 2019-10-24 NOTE — PROGRESS NOTES
Diabetes Education  Author: Sabine Pa RD, CDE  Date: 10/24/2019    Diabetes Care Management Summary  Diabetes Education Record Assessment/Progress: Comprehensive/Group  Current Diabetes Risk Level: Moderate     Last A1c:   Lab Results   Component Value Date    HGBA1C 10.3 (H) 10/17/2019     Last visit with Diabetes Educator: : 10/24/2019      Diabetes Type  Diabetes Type : Type II    Diabetes History  Diabetes Diagnosis: >10 years  Current Treatment: Diet, Insulin, Oral Medication(30 units Tresiba before bed. 4 units with breakfast, 4 units with lunch and 6 units with supper of Novolog. )  Reviewed Problem List with Patient: Yes    Health Maintenance was reviewed today with patient. Discussed with patient importance of routine eye exams, foot exams/foot care, blood work (i.e.: A1c, microalbumin, and lipid), dental visits, yearly flu vaccine, and pneumonia vaccine as indicated by PCP. Patient verbalized understanding.     Health Maintenance Topics with due status: Not Due       Topic Last Completion Date    Colonoscopy 04/11/2017    Eye Exam 09/09/2019    Lipid Panel 10/17/2019    Hemoglobin A1c 10/17/2019     Health Maintenance Due   Topic Date Due    TETANUS VACCINE  01/08/1976    Low Dose Statin  01/08/1979    Foot Exam  06/18/2019    Mammogram  12/13/2019       Nutrition  Meal Planning: water, 3 meals per day, diet drinks, snacks between meal, eats out often  What type of sweetener do you use?: sugar  What type of beverages do you drink?: diet soda/tea, juice, milk, water  Meal Plan 24 Hour Recall - Breakfast: (Grits with sausage and coffee with creamer and 5 sugars. This AM [employer] sandwich with coffee with creamer and 5 sugars. Sugar down from 10 packs. )  Meal Plan 24 Hour Recall - Lunch: (Spaghetti with butter and cheese. Same as Dinner.)  Meal Plan 24 Hour Recall - Dinner: (Spaghetti with butter and cheese. Same as lunch.)  Meal Plan 24 Hour Recall - Snack: (Potato chips (no salt) for  morning snack. [employer] ice cream after dinner.)    Monitoring   Monitoring: Other  Self Monitoring : (4 times per day)  Blood Glucose Logs: Yes  Do you use a personal continuous glucose monitor?: No  In the last month, how often have you had a low blood sugar reaction?: never  Can you tell when your blood sugar is too high?: no    Current Diabetes Treatment   Current Treatment: Diet, Insulin, Oral Medication(30 units Tresiba before bed. 4 units with breakfast, 4 units with lunch and 6 units with supper of Novolog. )    Social History  Preferred Learning Method: Face to Face  Primary Support: Self, Family  Smoking Status: Never a Smoker  Alcohol Use: Never    Barriers to Change  Barriers to Change: None    Readiness to Learn   Readiness to Learn : Acceptance    Diabetes Education Assessment/Progress  Diabetes Disease Process (diabetes disease process and treatment options): Discussion  Nutrition (Incorporating nutritional management into one's lifestyle): Discussion(Pt has decreased amount of sugar used in coffee. Discussed carbohydrate serving size and incorporating protein and fiber foods. Handouts given on carb portions and low carb snacks.)  Physical Activity (incorporating physical activity into one's lifestyle): Discussion  Medications (states correct name, dose, onset, peak, duration, side effects & timing of meds): Discussion, Instructed(Intructed pt to increase Tresiba to 32 units (from 30) in the evening.)  Monitoring (monitoring blood glucose/other parameters & using results): Discussion  Acute Complications (preventing, detecting, and treating acute complications): Discussion  Chronic Complications (preventing, detecting, and treating chronic complications): Discussion  Clinical (diabetes, other pertinent medical history, and relevant comorbidities reviewed during visit): Discussion  Cognitive (knowledge of self-management skills, functional health literacy): Discussion  Psychosocial (emotional  response to diabetes): Discussion  Diabetes Distress and Support Systems: Discussion  Behavioral (readiness for change, lifestyle practices, self-care behaviors): Discussion    Goals  Patient has selected/evaluated goals during today's session: Yes, selected(Nutrition goal of better snacks and following carb servings per meal.)  Healthy Eating: In Progress  Physical Activity: In Progress  Monitoring: In Progress  Medications: Set(Tresiba 32 units in evening.)  Problem Solving: In Progress  Healthy Coping: In Progress  Reducing Risks: In Progress  Other: In Progress    Diabetes Care Plan/Intervention  Education Plan/Intervention: Individual Follow-Up DSMT    Diabetes Meal Plan  Restrictions: Restricted Carbohydrate  Calories: 1500  Carbohydrate Per Meal: 30-45g  Carbohydrate Per Snack : 7-15g    Today's Self-Management Care Plan was developed with the patient's input and is based on barriers identified during today's assessment.    The long and short-term goals in the care plan were written with the patient/caregiver's input. The patient has agreed to work toward these goals to improve her overall diabetes control.      The patient received a copy of today's self-management plan and verbalized understanding of the care plan, goals, and all of today's instructions.      The patient was encouraged to communicate with her physician and care team regarding her condition(s) and treatment.  I provided the patient with my contact information today and encouraged her to contact me via phone or patient portal as needed.     Education Units of Time   Time Spent: 60 min     Roberta Riley, Dietetic Intern

## 2019-10-30 ENCOUNTER — LAB VISIT (OUTPATIENT)
Dept: LAB | Facility: HOSPITAL | Age: 59
End: 2019-10-30
Attending: NURSE PRACTITIONER
Payer: MEDICARE

## 2019-10-30 DIAGNOSIS — E11.21 TYPE 2 DIABETES MELLITUS WITH DIABETIC NEPHROPATHY, WITH LONG-TERM CURRENT USE OF INSULIN: ICD-10-CM

## 2019-10-30 DIAGNOSIS — Z79.4 TYPE 2 DIABETES MELLITUS WITH DIABETIC NEPHROPATHY, WITH LONG-TERM CURRENT USE OF INSULIN: ICD-10-CM

## 2019-10-30 LAB
ALBUMIN SERPL BCP-MCNC: 3.4 G/DL (ref 3.5–5.2)
ALP SERPL-CCNC: 103 U/L (ref 55–135)
ALT SERPL W/O P-5'-P-CCNC: 13 U/L (ref 10–44)
ANION GAP SERPL CALC-SCNC: 8 MMOL/L (ref 8–16)
AST SERPL-CCNC: 16 U/L (ref 10–40)
BILIRUB SERPL-MCNC: 0.7 MG/DL (ref 0.1–1)
BUN SERPL-MCNC: 16 MG/DL (ref 8–23)
CALCIUM SERPL-MCNC: 8.7 MG/DL (ref 8.7–10.5)
CHLORIDE SERPL-SCNC: 103 MMOL/L (ref 95–110)
CO2 SERPL-SCNC: 24 MMOL/L (ref 23–29)
CREAT SERPL-MCNC: 1.1 MG/DL (ref 0.5–1.4)
EST. GFR  (AFRICAN AMERICAN): >60 ML/MIN/1.73 M^2
EST. GFR  (NON AFRICAN AMERICAN): 54.3 ML/MIN/1.73 M^2
ESTIMATED AVG GLUCOSE: 252 MG/DL (ref 68–131)
GLUCOSE SERPL-MCNC: 306 MG/DL (ref 70–110)
HBA1C MFR BLD HPLC: 10.4 % (ref 4–5.6)
POTASSIUM SERPL-SCNC: 4.2 MMOL/L (ref 3.5–5.1)
PROT SERPL-MCNC: 7.5 G/DL (ref 6–8.4)
SODIUM SERPL-SCNC: 135 MMOL/L (ref 136–145)

## 2019-10-30 PROCEDURE — 36415 COLL VENOUS BLD VENIPUNCTURE: CPT | Mod: PO

## 2019-10-30 PROCEDURE — 83036 HEMOGLOBIN GLYCOSYLATED A1C: CPT

## 2019-10-30 PROCEDURE — 80053 COMPREHEN METABOLIC PANEL: CPT

## 2019-11-06 ENCOUNTER — PATIENT OUTREACH (OUTPATIENT)
Dept: ADMINISTRATIVE | Facility: HOSPITAL | Age: 59
End: 2019-11-06

## 2019-11-06 ENCOUNTER — OFFICE VISIT (OUTPATIENT)
Dept: ENDOCRINOLOGY | Facility: CLINIC | Age: 59
End: 2019-11-06
Payer: MEDICARE

## 2019-11-06 VITALS
HEIGHT: 59 IN | WEIGHT: 135.69 LBS | DIASTOLIC BLOOD PRESSURE: 88 MMHG | BODY MASS INDEX: 27.36 KG/M2 | TEMPERATURE: 98 F | SYSTOLIC BLOOD PRESSURE: 136 MMHG | HEART RATE: 64 BPM

## 2019-11-06 DIAGNOSIS — I15.2 HYPERTENSION ASSOCIATED WITH DIABETES: ICD-10-CM

## 2019-11-06 DIAGNOSIS — E11.3393 TYPE 2 DIABETES MELLITUS WITH BOTH EYES AFFECTED BY MODERATE NONPROLIFERATIVE RETINOPATHY WITHOUT MACULAR EDEMA, WITH LONG-TERM CURRENT USE OF INSULIN: Primary | ICD-10-CM

## 2019-11-06 DIAGNOSIS — Z79.4 TYPE 2 DIABETES MELLITUS WITH BOTH EYES AFFECTED BY MODERATE NONPROLIFERATIVE RETINOPATHY WITHOUT MACULAR EDEMA, WITH LONG-TERM CURRENT USE OF INSULIN: Primary | ICD-10-CM

## 2019-11-06 DIAGNOSIS — Z79.4 TYPE 2 DIABETES MELLITUS WITH HYPERGLYCEMIA, WITH LONG-TERM CURRENT USE OF INSULIN: ICD-10-CM

## 2019-11-06 DIAGNOSIS — Z12.31 SCREENING MAMMOGRAM, ENCOUNTER FOR: Primary | ICD-10-CM

## 2019-11-06 DIAGNOSIS — J44.9 CHRONIC OBSTRUCTIVE PULMONARY DISEASE, UNSPECIFIED COPD TYPE: ICD-10-CM

## 2019-11-06 DIAGNOSIS — E11.59 HYPERTENSION ASSOCIATED WITH DIABETES: ICD-10-CM

## 2019-11-06 DIAGNOSIS — E78.2 MIXED HYPERLIPIDEMIA: ICD-10-CM

## 2019-11-06 DIAGNOSIS — E11.65 TYPE 2 DIABETES MELLITUS WITH HYPERGLYCEMIA, WITH LONG-TERM CURRENT USE OF INSULIN: ICD-10-CM

## 2019-11-06 PROCEDURE — 99214 OFFICE O/P EST MOD 30 MIN: CPT | Mod: S$GLB,,, | Performed by: NURSE PRACTITIONER

## 2019-11-06 PROCEDURE — 99999 PR PBB SHADOW E&M-EST. PATIENT-LVL IV: CPT | Mod: PBBFAC,,, | Performed by: NURSE PRACTITIONER

## 2019-11-06 PROCEDURE — 99214 PR OFFICE/OUTPT VISIT, EST, LEVL IV, 30-39 MIN: ICD-10-PCS | Mod: S$GLB,,, | Performed by: NURSE PRACTITIONER

## 2019-11-06 PROCEDURE — 99999 PR PBB SHADOW E&M-EST. PATIENT-LVL IV: ICD-10-PCS | Mod: PBBFAC,,, | Performed by: NURSE PRACTITIONER

## 2019-11-06 RX ORDER — INSULIN DEGLUDEC 100 U/ML
30 INJECTION, SOLUTION SUBCUTANEOUS DAILY
Qty: 11 SYRINGE | Refills: 3
Start: 2019-11-06 | End: 2019-12-19 | Stop reason: SDUPTHER

## 2019-11-06 RX ORDER — INSULIN ASPART 100 [IU]/ML
INJECTION, SOLUTION INTRAVENOUS; SUBCUTANEOUS
Qty: 15 ML | Refills: 12
Start: 2019-11-06 | End: 2020-02-13

## 2019-11-06 NOTE — PROGRESS NOTES
CC: This 59 y.o. female presents for management of diabetes  and chronic conditions pending review including HTN, HLP, nephropathy     HPI: She was diagnosed with T2DM in 2014. Has never been hospitalized r/t DM.  Family hx of DM: mom, dad, brothers, and sister      hypoglycemia at home-none    monitoring BG at home:       bg 190  in office pp 1 hr pp [employer] chicken breakfast (eats [employer] 3 times a week for breakfast)  Lunch- home  Dinner: fried chicken, gravy and rice  w bread    Diet: Eats 3 meals a day, snacks- chuckie crackers or [employer] ice cream - 1-2 times a week  Exercise: none  CURRENT DM MEDS: tresiba 32 u qhs, glimepiride 4 mg qam   Vial/pen:  Uses pens  Glucometer type:  True Metrix     Standards of Care:  Eye exam: 9/2019 + DM retinopathy, + cataracts       Follows w Dr Ruthann barclay nephrology     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  Skin: Skin is intact and heals well, no rashes, no hair changes  Eyes: Denies visual disturbances  Resp: no SOB or SEGUNDO, no cough  Cardiac: No palpitations, chest pain, no edema   GI: No nausea or vomiting, diarrhea, constipation, or abdominal pain.  /GYN: 2-3+ nocturia, no burning or pain.   MS/Neuro: Denies numbness/ tingling in BLE; Gait steady, speech clear  Psych: Denies drug/ETOH abuse, no hx of depression.  Other systems: negative.    PE:  GENERAL: Well developed, well nourished.  PSYCH: AAOx3, appropriate mood and affect, pleasant expression, conversant, appears relaxed, well groomed.   EYES: Conjunctiva, corneas clear  NECK: Supple, trachea midline  NEURO: Gait steady  SKIN: Skin warm and dry no acanthosis nigracans.  FOOT EXAMINATION: 11/2019  No foot deformity, corns or callus formation,  nails in good condition and well trimmed, no interspace maceration or ulceration noted.  Decreased hair growth present over  toes/feet.  Positive vibratory response to 128 Hz tuning fork bilaterally.  Protective sensation intact with 10 gram monofilament.  +2 dorsalis pedis and posterior pulses noted.      Lab Results   Component Value Date    MICALBCREAT 25.7 07/03/2019       Hemoglobin A1C   Date Value Ref Range Status   10/30/2019 10.4 (H) 4.0 - 5.6 % Final     Comment:     ADA Screening Guidelines:  5.7-6.4%  Consistent with prediabetes  >or=6.5%  Consistent with diabetes  High levels of fetal hemoglobin interfere with the HbA1C  assay. Heterozygous hemoglobin variants (HbS, HgC, etc)do  not significantly interfere with this assay.   However, presence of multiple variants may affect accuracy.     10/17/2019 10.3 (H) 4.0 - 5.6 % Final     Comment:     ADA Screening Guidelines:  5.7-6.4%  Consistent with prediabetes  >or=6.5%  Consistent with diabetes  High levels of fetal hemoglobin interfere with the HbA1C  assay. Heterozygous hemoglobin variants (HbS, HgC, etc)do  not significantly interfere with this assay.   However, presence of multiple variants may affect accuracy.     07/03/2019 12.0 (H) 4.0 - 5.6 % Final     Comment:     ADA Screening Guidelines:  5.7-6.4%  Consistent with prediabetes  >or=6.5%  Consistent with diabetes  High levels of fetal hemoglobin interfere with the HbA1C  assay. Heterozygous hemoglobin variants (HbS, HgC, etc)do  not significantly interfere with this assay.   However, presence of multiple variants may affect accuracy.          ASSESSMENT and PLAN:    1. T2DM with hyperglycemia, DM nephropathy, Dm retinopathy  Decrease Tresiba to 30 u qhs, Increase Novolog to 6 u Ac + correction  Rotate injection sites  Check bg 4 times a day - f/u in 4 weeks w log   Discussed A1c and BG goals.   - takes  Arb, statin    2. HTN - controlled, continue meds as previously prescribed and monitor.     3. HLP - on statin therapy, LFTs WNL    4. COPD- No current steroids    5.  Dm nephropathy follows w Dr Echols     6. Vitamin d  deficiency Continue ergocalciferol 46021 IU weekly    Follow-up: in 3 months with lab prior

## 2019-11-06 NOTE — LETTER
November 6, 2019    Steff Johnson  [address]  [facility] [address]             [facility]  [address]  [facility] [address]  Phone: [phone] [facility] is committed to your overall health.  To help you get the most out of each of your visits, we will review your information to make sure you are up to date on all of your recommended tests and/or procedures.      Dr. Cristina Ren MD has found that your chart shows you may be due for a:    MAMMOGRAM (BREAST CANCER SCREENING)        If you have had any of the above done at another facility, please bring the records or information with you so that your record at [facility] will be complete.  If you would like to schedule any of these, please contact the clinic at [phone].    If you are currently taking medication, please bring it with you to your appointment for review.    Also, if you have any type of Advanced Directives, please bring them with you to your office visit so we may scan them into your chart.    Thank You,    Your [facility],  MD Jackelyn Neff LPN Clinical Care Coordinator  [facility]  [address] [facility] [address]  Phone [phone]  Fax [phone]

## 2019-11-06 NOTE — PROGRESS NOTES
Health Maintenance Due   Topic Date Due    TETANUS VACCINE  01/08/1978    Shingles Vaccine (1 of 2) 01/08/2008    Influenza Vaccine (1) 09/01/2019    Mammogram  12/13/2019

## 2019-11-18 ENCOUNTER — TELEPHONE (OUTPATIENT)
Dept: FAMILY MEDICINE | Facility: CLINIC | Age: 59
End: 2019-11-18

## 2019-11-18 NOTE — TELEPHONE ENCOUNTER
Attempted call back, no answer was hung up on twice while on hold.   Left message with pt to call back to discuss requested refill.

## 2019-11-18 NOTE — TELEPHONE ENCOUNTER
----- Message from Lin Cooper sent at 11/18/2019  9:25 AM CST -----  Contact: Jennifer with [facility]  Type:  Pharmacy Calling to Clarify an RX    Name of Caller:  Jennifer  Pharmacy Name: [facility]  Prescription Name:  Pain medications  What do they need to clarify?:  If fax was received for refill  Best Call Back Number: [phone]

## 2019-11-20 ENCOUNTER — OFFICE VISIT (OUTPATIENT)
Dept: FAMILY MEDICINE | Facility: CLINIC | Age: 59
End: 2019-11-20
Payer: MEDICARE

## 2019-11-20 VITALS
HEART RATE: 72 BPM | TEMPERATURE: 98 F | WEIGHT: 138 LBS | SYSTOLIC BLOOD PRESSURE: 167 MMHG | HEIGHT: 59 IN | DIASTOLIC BLOOD PRESSURE: 96 MMHG | OXYGEN SATURATION: 97 % | BODY MASS INDEX: 27.82 KG/M2

## 2019-11-20 DIAGNOSIS — I10 UNCONTROLLED HYPERTENSION: Primary | ICD-10-CM

## 2019-11-20 DIAGNOSIS — E11.69 HYPERLIPIDEMIA ASSOCIATED WITH TYPE 2 DIABETES MELLITUS: ICD-10-CM

## 2019-11-20 DIAGNOSIS — Z79.4 TYPE 2 DIABETES MELLITUS WITH HYPERGLYCEMIA, WITH LONG-TERM CURRENT USE OF INSULIN: ICD-10-CM

## 2019-11-20 DIAGNOSIS — I15.2 HYPERTENSION ASSOCIATED WITH DIABETES: ICD-10-CM

## 2019-11-20 DIAGNOSIS — E78.5 HYPERLIPIDEMIA ASSOCIATED WITH TYPE 2 DIABETES MELLITUS: ICD-10-CM

## 2019-11-20 DIAGNOSIS — E11.65 TYPE 2 DIABETES MELLITUS WITH HYPERGLYCEMIA, WITH LONG-TERM CURRENT USE OF INSULIN: ICD-10-CM

## 2019-11-20 DIAGNOSIS — E11.59 HYPERTENSION ASSOCIATED WITH DIABETES: ICD-10-CM

## 2019-11-20 PROCEDURE — 3046F HEMOGLOBIN A1C LEVEL >9.0%: CPT | Mod: CPTII,S$GLB,, | Performed by: NURSE PRACTITIONER

## 2019-11-20 PROCEDURE — 3046F PR MOST RECENT HEMOGLOBIN A1C LEVEL > 9.0%: ICD-10-PCS | Mod: CPTII,S$GLB,, | Performed by: NURSE PRACTITIONER

## 2019-11-20 PROCEDURE — 99214 PR OFFICE/OUTPT VISIT, EST, LEVL IV, 30-39 MIN: ICD-10-PCS | Mod: S$GLB,,, | Performed by: NURSE PRACTITIONER

## 2019-11-20 PROCEDURE — 3080F PR MOST RECENT DIASTOLIC BLOOD PRESSURE >= 90 MM HG: ICD-10-PCS | Mod: CPTII,S$GLB,, | Performed by: NURSE PRACTITIONER

## 2019-11-20 PROCEDURE — 3074F PR MOST RECENT SYSTOLIC BLOOD PRESSURE < 130 MM HG: ICD-10-PCS | Mod: CPTII,S$GLB,, | Performed by: NURSE PRACTITIONER

## 2019-11-20 PROCEDURE — 3008F BODY MASS INDEX DOCD: CPT | Mod: CPTII,S$GLB,, | Performed by: NURSE PRACTITIONER

## 2019-11-20 PROCEDURE — 3008F PR BODY MASS INDEX (BMI) DOCUMENTED: ICD-10-PCS | Mod: CPTII,S$GLB,, | Performed by: NURSE PRACTITIONER

## 2019-11-20 PROCEDURE — 99214 OFFICE O/P EST MOD 30 MIN: CPT | Mod: S$GLB,,, | Performed by: NURSE PRACTITIONER

## 2019-11-20 PROCEDURE — 99999 PR PBB SHADOW E&M-EST. PATIENT-LVL IV: CPT | Mod: PBBFAC,,, | Performed by: NURSE PRACTITIONER

## 2019-11-20 PROCEDURE — 3080F DIAST BP >= 90 MM HG: CPT | Mod: CPTII,S$GLB,, | Performed by: NURSE PRACTITIONER

## 2019-11-20 PROCEDURE — 99999 PR PBB SHADOW E&M-EST. PATIENT-LVL IV: ICD-10-PCS | Mod: PBBFAC,,, | Performed by: NURSE PRACTITIONER

## 2019-11-20 PROCEDURE — 3074F SYST BP LT 130 MM HG: CPT | Mod: CPTII,S$GLB,, | Performed by: NURSE PRACTITIONER

## 2019-11-20 RX ORDER — ATORVASTATIN CALCIUM 20 MG/1
20 TABLET, FILM COATED ORAL DAILY
Qty: 90 TABLET | Refills: 3 | Status: ON HOLD | OUTPATIENT
Start: 2019-11-20 | End: 2019-12-14 | Stop reason: HOSPADM

## 2019-11-20 RX ORDER — HYDRALAZINE HYDROCHLORIDE 25 MG/1
25 TABLET, FILM COATED ORAL 3 TIMES DAILY
Qty: 90 TABLET | Refills: 11 | Status: SHIPPED | OUTPATIENT
Start: 2019-11-20 | End: 2020-01-22 | Stop reason: SDUPTHER

## 2019-11-20 NOTE — PROGRESS NOTES
Subjective:       Patient ID: Steff Johnson is a 59 y.o. female.    Chief Complaint: Hypertension (1 month f/u)    Ms. Johnson is a 59-year-old female patient who presents for monthly follow-up appointments due to multiple uncontrolled chronic conditions.  Recently had visit with Endocrinology for uncontrolled diabetes, long-acting insulin decreased and mealtime insulin added.  She also an appointment with diabetic education.  Notes reviewed, not compliant with diabetic diet.  She reports she is monitoring her blood sugar at home as instructed, does not have log today.  Also has history of uncontrolled hypertension, followed by Nephrology in the past.  At her last visit metoprolol was increased to 200 mg.  She is also taking amlodipine-valsartan, wearing clonidine patch, chlorthalidone.  Blood pressure remains uncontrolled.  Does not have log today.  Has follow-up scheduled with nephrology in February 2020.  She is feeling well today, no complaints.  Vital stable aside from resistant blood pressure.    Patient Active Problem List   Diagnosis    Hypertension associated with diabetes    COPD (chronic obstructive pulmonary disease)    Type 2 diabetes mellitus with hyperglycemia, with long-term current use of insulin    Proteinuria    Complete tear of left rotator cuff    Left shoulder pain    Shoulder stiffness, left    Shoulder weakness    Type 2 diabetes mellitus with diabetic nephropathy    Mixed hyperlipidemia    Continuous opioid dependence    Type 2 diabetes mellitus with both eyes affected by moderate nonproliferative retinopathy without macular edema, with long-term current use of insulin       Current Outpatient Medications:     amlodipine-valsartan (EXFORGE)  mg per tablet, Take 1 tablet by mouth once daily., Disp: 90 tablet, Rfl: 3    BLOOD PRESSURE CUFF Misc, 1 kit by Misc.(Non-Drug; Combo Route) route 3 (three) times daily., Disp: 1 each, Rfl: 0    blood pressure monitor (BLOOD  "PRESSURE KIT) Kit, 1 kit by Misc.(Non-Drug; Combo Route) route 3 (three) times daily., Disp: 1 each, Rfl: 0    blood sugar diagnostic Strp, To check BG 2 times daily, to use with insurance preferred meter, Disp: 200 strip, Rfl: 3    blood-glucose meter kit, To check BG 2 times daily, to use with insurance preferred meter, Disp: 1 each, Rfl: 0    chlorthalidone (HYGROTEN) 25 MG Tab, Take 2 tablets (50 mg total) by mouth once daily., Disp: 90 tablet, Rfl: 3    cloNIDine 0.3 mg/24 hr td ptwk (CATAPRES) 0.3 mg/24 hr, Place 1 patch onto the skin every 7 days., Disp: 4 patch, Rfl: 11    clotrimazole-betamethasone 1-0.05% (LOTRISONE) cream, Apply topically 2 (two) times daily., Disp: 15 g, Rfl: 0    diclofenac sodium (VOLTAREN) 1 % Gel, Apply 2 g topically 4 (four) times daily., Disp: 100 g, Rfl: prn    ergocalciferol (ERGOCALCIFEROL) 50,000 unit Cap, Take 1 capsule (50,000 Units total) by mouth every 7 days., Disp: 12 capsule, Rfl: 4    fluticasone (FLONASE) 50 mcg/actuation nasal spray, 1 spray (50 mcg total) by Each Nare route once daily., Disp: 1 Bottle, Rfl: 3    HYDROcodone-acetaminophen (NORCO)  mg per tablet, Take 1 tablet by mouth every 6 (six) hours as needed for Pain., Disp: 40 tablet, Rfl: 0    hydrocortisone 1 % cream, Apply to affected area 2 times daily, Disp: 30 g, Rfl: 0    insulin aspart U-100 (NOVOLOG FLEXPEN U-100 INSULIN) 100 unit/mL (3 mL) InPn pen, Novolog 6 u AC + correction Max TDD 35u, Disp: 15 mL, Rfl: 12    insulin degludec (TRESIBA FLEXTOUCH U-100) 100 unit/mL (3 mL) InPn, Inject 30 Units into the skin once daily., Disp: 11 Syringe, Rfl: 3    lancets Misc, To check BG 2 times daily, to use with insurance preferred meter, Disp: 200 each, Rfl: 3    metoprolol succinate (TOPROL-XL) 200 MG 24 hr tablet, Take 1 tablet (200 mg total) by mouth once daily., Disp: 90 tablet, Rfl: 3    pen needle, diabetic (BD MI 2ND GEN PEN NEEDLE) 32 gauge x 5/32" Ndle, Uses 4 daily, Disp: 400 " each, Rfl: 4    tiZANidine (ZANAFLEX) 4 MG tablet, TAKE 1 TABLET(4 MG) BY MOUTH EVERY 6 HOURS AS NEEDED FOR MUSCLE SPASM, Disp: 30 tablet, Rfl: 1    triamcinolone acetonide 0.1% (KENALOG) 0.1 % Lotn, Apply topically 2 (two) times daily., Disp: 240 mL, Rfl: 0    umeclidinium (INCRUSE ELLIPTA) 62.5 mcg/actuation DsDv, Inhale 1 each into the lungs once daily. Controller, Disp: 30 each, Rfl: 11    atorvastatin (LIPITOR) 20 MG tablet, Take 1 tablet (20 mg total) by mouth once daily., Disp: 90 tablet, Rfl: 3    hydrALAZINE (APRESOLINE) 25 MG tablet, Take 1 tablet (25 mg total) by mouth 3 (three) times daily., Disp: 90 tablet, Rfl: 11    Lab Results   Component Value Date    WBC 10.25 10/17/2019    HGB 12.5 10/17/2019    HCT 37.1 10/17/2019     (H) 10/17/2019    CHOL 184 10/17/2019    TRIG 48 10/17/2019    HDL 75 10/17/2019    ALT 13 10/30/2019    AST 16 10/30/2019     (L) 10/30/2019    K 4.2 10/30/2019     10/30/2019    CREATININE 1.1 10/30/2019    BUN 16 10/30/2019    CO2 24 10/30/2019    TSH 0.973 07/03/2019    HGBA1C 10.4 (H) 10/30/2019     Review of Systems   Constitutional: Negative for activity change, appetite change, fatigue and fever.   HENT: Negative for congestion, ear pain, sinus pressure, sinus pain, sneezing, sore throat and trouble swallowing.    Eyes: Negative for photophobia and pain.   Respiratory: Negative for cough and shortness of breath.    Cardiovascular: Negative for chest pain and palpitations.   Gastrointestinal: Negative for abdominal pain, constipation, diarrhea, nausea and vomiting.   Genitourinary: Negative for difficulty urinating, dysuria, frequency, hematuria and urgency.   Musculoskeletal: Negative for arthralgias, gait problem, myalgias and neck pain.   Skin: Negative for rash and wound.   Neurological: Negative for dizziness, syncope, weakness, light-headedness and headaches.   Hematological: Negative for adenopathy.   Psychiatric/Behavioral: Negative for  confusion and decreased concentration. The patient is not nervous/anxious.        Objective:      Physical Exam   Constitutional: She is oriented to person, place, and time. Vital signs are normal. She appears well-developed and well-nourished. No distress.   Cardiovascular: Normal rate, regular rhythm and normal heart sounds.   Pulmonary/Chest: Effort normal and breath sounds normal. She has no wheezes.   Abdominal: Soft. Normal appearance.   Musculoskeletal: She exhibits no edema.   Neurological: She is alert and oriented to person, place, and time.   Skin: Skin is warm and dry.   Psychiatric: She has a normal mood and affect.   Nursing note and vitals reviewed.      Assessment:       1. Uncontrolled hypertension    2. Hypertension associated with diabetes    3. Type 2 diabetes mellitus with hyperglycemia, with long-term current use of insulin    4. Hyperlipidemia associated with type 2 diabetes mellitus        Plan:       Steff was seen today for hypertension.    Diagnoses and all orders for this visit:    Uncontrolled hypertension  -     hydrALAZINE (APRESOLINE) 25 MG tablet; Take 1 tablet (25 mg total) by mouth 3 (three) times daily.  Remains uncontrolled, add hydralazine  Provided with blood pressure log, patient to monitor at home and record.  Follow-up 1 month as scheduled  Nephrology as scheduled    Hypertension associated with diabetes  -     hydrALAZINE (APRESOLINE) 25 MG tablet; Take 1 tablet (25 mg total) by mouth 3 (three) times daily.  See above    Type 2 diabetes mellitus with hyperglycemia, with long-term current use of insulin  Endocrine following  No blood sugar log to review today for possible need for medication adjustments  Encouraged to keep follow-up as scheduled, bring blood sugar logs to follow-up appointments  Discussed diabetic diet    Hyperlipidemia associated with type 2 diabetes mellitus  -     atorvastatin (LIPITOR) 20 MG tablet; Take 1 tablet (20 mg total) by mouth once  daily.  Stable condition.  Continue current medications.  Will adjust based on lab findings or if condition changes.    Patient readiness: acceptance and barriers:none    During the course of the visit the patient was educated and counseled about the following:     Diabetes:  Addressed ADA diet.  Suggested low cholesterol diet.  Reminded to bring in blood sugar diary at next visit.  Hypertension:   Medication: begin hydralazine .  Screening for causes of secondary hypertension: nephrology appointment.  Check blood pressures 2 times daily and record.    Goals: Diabetes: Maintain Hemoglobin A1C below 7 and Hypertension: Reduce Blood Pressure    Did patient meet goals/outcomes: No    The following self management tools provided: blood pressure log  blood glucose log    Patient Instructions (the written plan) was given to the patient/family.     Time spent with patient: 15 minutes    Barriers to medications present (no )    Adverse reactions to current medications (no)    Over the counter medications reviewed (Yes)    Monthly followups as scheduled

## 2019-11-20 NOTE — PATIENT INSTRUCTIONS
Bring blood sugar and blood pressure log to follow up appointments    Add hydralazine 25 mg three times a day. Check blood pressure after medications and record on provided log.

## 2019-12-10 ENCOUNTER — PATIENT OUTREACH (OUTPATIENT)
Dept: ADMINISTRATIVE | Facility: HOSPITAL | Age: 59
End: 2019-12-10

## 2019-12-12 ENCOUNTER — HOSPITAL ENCOUNTER (INPATIENT)
Facility: HOSPITAL | Age: 59
LOS: 1 days | Discharge: HOME | DRG: 066 | End: 2019-12-14
Attending: EMERGENCY MEDICINE | Admitting: INTERNAL MEDICINE
Payer: MEDICARE

## 2019-12-12 ENCOUNTER — TELEPHONE (OUTPATIENT)
Dept: FAMILY MEDICINE | Facility: CLINIC | Age: 59
End: 2019-12-12

## 2019-12-12 ENCOUNTER — NURSE TRIAGE (OUTPATIENT)
Dept: ADMINISTRATIVE | Facility: CLINIC | Age: 59
End: 2019-12-12

## 2019-12-12 ENCOUNTER — HOSPITAL ENCOUNTER (EMERGENCY)
Facility: HOSPITAL | Age: 59
Discharge: HOME | End: 2019-12-12
Attending: EMERGENCY MEDICINE
Payer: MEDICARE

## 2019-12-12 VITALS
DIASTOLIC BLOOD PRESSURE: 88 MMHG | TEMPERATURE: 98 F | OXYGEN SATURATION: 97 % | HEIGHT: 59 IN | SYSTOLIC BLOOD PRESSURE: 197 MMHG | HEART RATE: 69 BPM | RESPIRATION RATE: 14 BRPM | WEIGHT: 138 LBS | BODY MASS INDEX: 27.82 KG/M2

## 2019-12-12 DIAGNOSIS — I10 UNCONTROLLED HYPERTENSION: ICD-10-CM

## 2019-12-12 DIAGNOSIS — R73.9 HYPERGLYCEMIA: ICD-10-CM

## 2019-12-12 DIAGNOSIS — I63.9 STROKE: ICD-10-CM

## 2019-12-12 DIAGNOSIS — H00.011 HORDEOLUM EXTERNUM OF RIGHT UPPER EYELID: ICD-10-CM

## 2019-12-12 DIAGNOSIS — H49.02 CRANIAL NERVE III PALSY, LEFT: Primary | ICD-10-CM

## 2019-12-12 DIAGNOSIS — H00.011 HORDEOLUM EXTERNUM OF RIGHT UPPER EYELID: Primary | ICD-10-CM

## 2019-12-12 DIAGNOSIS — I63.9 CVA (CEREBRAL VASCULAR ACCIDENT): ICD-10-CM

## 2019-12-12 PROBLEM — H49.00 CRANIAL NERVE III PALSY: Status: ACTIVE | Noted: 2019-12-12

## 2019-12-12 LAB
ALBUMIN SERPL BCP-MCNC: 3.9 G/DL (ref 3.5–5.2)
ALP SERPL-CCNC: 83 U/L (ref 55–135)
ALT SERPL W/O P-5'-P-CCNC: 15 U/L (ref 10–44)
ANION GAP SERPL CALC-SCNC: 8 MMOL/L (ref 8–16)
AST SERPL-CCNC: 17 U/L (ref 10–40)
BASOPHILS # BLD AUTO: 0.06 K/UL (ref 0–0.2)
BASOPHILS NFR BLD: 0.6 % (ref 0–1.9)
BILIRUB SERPL-MCNC: 0.9 MG/DL (ref 0.1–1)
BUN SERPL-MCNC: 24 MG/DL (ref 8–23)
CALCIUM SERPL-MCNC: 9.3 MG/DL (ref 8.7–10.5)
CHLORIDE SERPL-SCNC: 98 MMOL/L (ref 95–110)
CHOLEST SERPL-MCNC: 154 MG/DL (ref 120–199)
CHOLEST/HDLC SERPL: 2.3 {RATIO} (ref 2–5)
CO2 SERPL-SCNC: 29 MMOL/L (ref 23–29)
CREAT SERPL-MCNC: 1.3 MG/DL (ref 0.5–1.4)
DIFFERENTIAL METHOD: ABNORMAL
EOSINOPHIL # BLD AUTO: 0.2 K/UL (ref 0–0.5)
EOSINOPHIL NFR BLD: 1.8 % (ref 0–8)
ERYTHROCYTE [DISTWIDTH] IN BLOOD BY AUTOMATED COUNT: 13.8 % (ref 11.5–14.5)
EST. GFR  (AFRICAN AMERICAN): 51.2 ML/MIN/1.73 M^2
EST. GFR  (NON AFRICAN AMERICAN): 44.4 ML/MIN/1.73 M^2
GLUCOSE SERPL-MCNC: 171 MG/DL (ref 70–110)
GLUCOSE SERPL-MCNC: 315 MG/DL (ref 70–110)
GLUCOSE SERPL-MCNC: 329 MG/DL (ref 70–110)
HCT VFR BLD AUTO: 37.1 % (ref 37–48.5)
HDLC SERPL-MCNC: 66 MG/DL (ref 40–75)
HDLC SERPL: 42.9 % (ref 20–50)
HGB BLD-MCNC: 12.9 G/DL (ref 12–16)
IMM GRANULOCYTES # BLD AUTO: 0.07 K/UL (ref 0–0.04)
IMM GRANULOCYTES NFR BLD AUTO: 0.8 % (ref 0–0.5)
INR PPP: 1
LDLC SERPL CALC-MCNC: 63.2 MG/DL (ref 63–159)
LYMPHOCYTES # BLD AUTO: 2.4 K/UL (ref 1–4.8)
LYMPHOCYTES NFR BLD: 26.2 % (ref 18–48)
MCH RBC QN AUTO: 26.8 PG (ref 27–31)
MCHC RBC AUTO-ENTMCNC: 34.8 G/DL (ref 32–36)
MCV RBC AUTO: 77 FL (ref 82–98)
MONOCYTES # BLD AUTO: 0.8 K/UL (ref 0.3–1)
MONOCYTES NFR BLD: 9 % (ref 4–15)
NEUTROPHILS # BLD AUTO: 5.7 K/UL (ref 1.8–7.7)
NEUTROPHILS NFR BLD: 61.6 % (ref 38–73)
NONHDLC SERPL-MCNC: 88 MG/DL
NRBC BLD-RTO: 0 /100 WBC
PLATELET # BLD AUTO: 411 K/UL (ref 150–350)
PMV BLD AUTO: 10.5 FL (ref 9.2–12.9)
POTASSIUM SERPL-SCNC: 4.1 MMOL/L (ref 3.5–5.1)
PROT SERPL-MCNC: 7.5 G/DL (ref 6–8.4)
PROTHROMBIN TIME: 13.2 SEC (ref 10.6–14.8)
RBC # BLD AUTO: 4.81 M/UL (ref 4–5.4)
SODIUM SERPL-SCNC: 135 MMOL/L (ref 136–145)
TRIGL SERPL-MCNC: 124 MG/DL (ref 30–150)
TSH SERPL DL<=0.005 MIU/L-ACNC: 1.24 UIU/ML (ref 0.34–5.6)
WBC # BLD AUTO: 9.29 K/UL (ref 3.9–12.7)

## 2019-12-12 PROCEDURE — G0378 HOSPITAL OBSERVATION PER HR: HCPCS

## 2019-12-12 PROCEDURE — 99285 EMERGENCY DEPT VISIT HI MDM: CPT | Mod: 25

## 2019-12-12 PROCEDURE — 80053 COMPREHEN METABOLIC PANEL: CPT

## 2019-12-12 PROCEDURE — 82962 GLUCOSE BLOOD TEST: CPT

## 2019-12-12 PROCEDURE — 25500020 PHARM REV CODE 255: Performed by: EMERGENCY MEDICINE

## 2019-12-12 PROCEDURE — 27000221 HC OXYGEN, UP TO 24 HOURS

## 2019-12-12 PROCEDURE — 99283 EMERGENCY DEPT VISIT LOW MDM: CPT

## 2019-12-12 PROCEDURE — 99900035 HC TECH TIME PER 15 MIN (STAT)

## 2019-12-12 PROCEDURE — 80061 LIPID PANEL: CPT

## 2019-12-12 PROCEDURE — 85610 PROTHROMBIN TIME: CPT

## 2019-12-12 PROCEDURE — 85025 COMPLETE CBC W/AUTO DIFF WBC: CPT

## 2019-12-12 PROCEDURE — 84443 ASSAY THYROID STIM HORMONE: CPT

## 2019-12-12 PROCEDURE — 36415 COLL VENOUS BLD VENIPUNCTURE: CPT

## 2019-12-12 PROCEDURE — 94761 N-INVAS EAR/PLS OXIMETRY MLT: CPT

## 2019-12-12 PROCEDURE — 93005 ELECTROCARDIOGRAM TRACING: CPT

## 2019-12-12 PROCEDURE — 25000003 PHARM REV CODE 250: Performed by: EMERGENCY MEDICINE

## 2019-12-12 RX ORDER — BISACODYL 10 MG
10 SUPPOSITORY, RECTAL RECTAL DAILY PRN
Status: DISCONTINUED | OUTPATIENT
Start: 2019-12-12 | End: 2019-12-14 | Stop reason: HOSPADM

## 2019-12-12 RX ORDER — AMLODIPINE AND VALSARTAN 10; 320 MG/1; MG/1
1 TABLET ORAL DAILY
Status: DISCONTINUED | OUTPATIENT
Start: 2019-12-13 | End: 2019-12-12

## 2019-12-12 RX ORDER — CHLORTHALIDONE 25 MG/1
50 TABLET ORAL DAILY
Status: DISCONTINUED | OUTPATIENT
Start: 2019-12-13 | End: 2019-12-13

## 2019-12-12 RX ORDER — PANTOPRAZOLE SODIUM 40 MG/10ML
40 INJECTION, POWDER, LYOPHILIZED, FOR SOLUTION INTRAVENOUS DAILY
Status: DISCONTINUED | OUTPATIENT
Start: 2019-12-13 | End: 2019-12-14 | Stop reason: HOSPADM

## 2019-12-12 RX ORDER — ERYTHROMYCIN 5 MG/G
OINTMENT OPHTHALMIC
Qty: 1 TUBE | Refills: 0 | Status: SHIPPED | OUTPATIENT
Start: 2019-12-12 | End: 2019-12-12

## 2019-12-12 RX ORDER — GLUCAGON 1 MG
1 KIT INJECTION
Status: DISCONTINUED | OUTPATIENT
Start: 2019-12-12 | End: 2019-12-14 | Stop reason: HOSPADM

## 2019-12-12 RX ORDER — METOPROLOL SUCCINATE 50 MG/1
200 TABLET, EXTENDED RELEASE ORAL DAILY
Status: DISCONTINUED | OUTPATIENT
Start: 2019-12-13 | End: 2019-12-13

## 2019-12-12 RX ORDER — HYDRALAZINE HYDROCHLORIDE 25 MG/1
25 TABLET, FILM COATED ORAL 3 TIMES DAILY
Status: DISCONTINUED | OUTPATIENT
Start: 2019-12-13 | End: 2019-12-13

## 2019-12-12 RX ORDER — ATORVASTATIN CALCIUM 20 MG/1
20 TABLET, FILM COATED ORAL DAILY
Status: DISCONTINUED | OUTPATIENT
Start: 2019-12-13 | End: 2019-12-12

## 2019-12-12 RX ORDER — ATORVASTATIN CALCIUM 40 MG/1
40 TABLET, FILM COATED ORAL DAILY
Status: DISCONTINUED | OUTPATIENT
Start: 2019-12-13 | End: 2019-12-14 | Stop reason: HOSPADM

## 2019-12-12 RX ORDER — ONDANSETRON 2 MG/ML
4 INJECTION INTRAMUSCULAR; INTRAVENOUS EVERY 8 HOURS PRN
Status: DISCONTINUED | OUTPATIENT
Start: 2019-12-12 | End: 2019-12-14 | Stop reason: HOSPADM

## 2019-12-12 RX ORDER — AMLODIPINE BESYLATE 5 MG/1
10 TABLET ORAL DAILY
Status: DISCONTINUED | OUTPATIENT
Start: 2019-12-13 | End: 2019-12-13

## 2019-12-12 RX ORDER — SODIUM CHLORIDE 0.9 % (FLUSH) 0.9 %
10 SYRINGE (ML) INJECTION
Status: DISCONTINUED | OUTPATIENT
Start: 2019-12-12 | End: 2019-12-14 | Stop reason: HOSPADM

## 2019-12-12 RX ORDER — INSULIN ASPART 100 [IU]/ML
0-5 INJECTION, SOLUTION INTRAVENOUS; SUBCUTANEOUS EVERY 6 HOURS PRN
Status: DISCONTINUED | OUTPATIENT
Start: 2019-12-12 | End: 2019-12-14 | Stop reason: HOSPADM

## 2019-12-12 RX ORDER — ASPIRIN 325 MG
325 TABLET, DELAYED RELEASE (ENTERIC COATED) ORAL DAILY
Status: DISCONTINUED | OUTPATIENT
Start: 2019-12-13 | End: 2019-12-14

## 2019-12-12 RX ORDER — VALSARTAN 80 MG/1
320 TABLET ORAL DAILY
Status: DISCONTINUED | OUTPATIENT
Start: 2019-12-13 | End: 2019-12-13

## 2019-12-12 RX ORDER — ASPIRIN 325 MG
325 TABLET ORAL
Status: COMPLETED | OUTPATIENT
Start: 2019-12-12 | End: 2019-12-12

## 2019-12-12 RX ADMIN — IOHEXOL 100 ML: 350 INJECTION, SOLUTION INTRAVENOUS at 07:12

## 2019-12-12 RX ADMIN — ASPIRIN 325 MG ORAL TABLET 325 MG: 325 PILL ORAL at 08:12

## 2019-12-12 NOTE — TELEPHONE ENCOUNTER
Spoke patient she is currently at [facility].. Advised to contact office for follow up for elevated blood sugar once out of hospital per Ms Shaw.. Verbalized understanding

## 2019-12-12 NOTE — ED PROVIDER NOTES
Encounter Date: 12/12/2019    SCRIBE #1 NOTE: I, Aniya Guevara, am scribing for, and in the presence of, Javier Ferreira MD.       History     Chief Complaint   Patient presents with    Eye Problem     Awoke with blurred vision.       Time seen by provider: 7:24 AM on 12/12/2019    Steff Johnson is a 59 y.o. female with DM and HTN who presents to the ED with an onset of blurry vision in both eyes upon waking up this morning. She wears glasses at baseline and takes several medications to control her BP but has not taken any this morning. The patient denies chest pain, nausea, vomiting, abdominal pain, HA, changes in speech, or any other symptoms at this time. No occular PSHx.    The history is provided by the patient and the spouse ().     Review of patient's allergies indicates:  No Known Allergies  Past Medical History:   Diagnosis Date    Arthritis     COPD (chronic obstructive pulmonary disease)     Diabetes mellitus     Hypertension     Wears glasses      Past Surgical History:   Procedure Laterality Date    COLONOSCOPY N/A 4/11/2017    Procedure: COLONOSCOPY;  Surgeon: Jared Antonio MD;  Location: [facility];  Service: Endoscopy;  Laterality: N/A;    HYSTERECTOMY      OOPHORECTOMY      SHOULDER SURGERY      R     Family History   Problem Relation Age of Onset    Diabetes Mother     Asthma Mother     Hypertension Mother     Diabetes Father     Diabetes Brother     Hypertension Brother     Anemia Daughter      Social History     Tobacco Use    Smoking status: Never Smoker    Smokeless tobacco: Never Used   Substance Use Topics    Alcohol use: No    Drug use: No     Review of Systems   Constitutional: Negative for chills and fever.   HENT: Negative for nosebleeds.    Eyes: Positive for visual disturbance.   Respiratory: Negative for cough and shortness of breath.    Cardiovascular: Negative for chest pain.   Gastrointestinal: Negative for abdominal pain, nausea and vomiting.    Genitourinary: Negative for dysuria and hematuria.   Musculoskeletal: Negative for back pain and neck pain.   Skin: Negative for rash.   Neurological: Negative for speech difficulty and headaches.     Physical Exam     Initial Vitals [12/12/19 0708]   BP Pulse Resp Temp SpO2   (!) 197/88 69 14 98 °F (36.7 °C) 97 %      MAP       --         Physical Exam    Nursing note and vitals reviewed.  Constitutional: She appears well-developed and well-nourished. She is not diaphoretic. No distress.   HENT:   Head: Normocephalic and atraumatic.   Eyes: EOM are normal. Pupils are equal, round, and reactive to light.   Stye noted to the right upper eyelid.    Neck: Normal range of motion. Neck supple.   Cardiovascular: Normal rate, regular rhythm, normal heart sounds and intact distal pulses. Exam reveals no gallop and no friction rub.    No murmur heard.  Pulmonary/Chest: Breath sounds normal. No respiratory distress. She has no wheezes. She has no rhonchi. She has no rales.   Abdominal: Soft. Bowel sounds are normal. There is no tenderness. There is no rebound and no guarding.   Musculoskeletal: Normal range of motion.   Neurological: She is alert and oriented to person, place, and time.   Skin: Skin is warm and dry.   Psychiatric: She has a normal mood and affect. Her behavior is normal. Judgment and thought content normal.       ED Course   Procedures  Labs Reviewed - No data to display     Imaging Results    None          Medical Decision Making:   History:   Old Medical Records: I decided to obtain old medical records.  Initial Assessment:    59-year-old female presented with blurry vision.  Differential Diagnosis:   Initial differential diagnosis included but not limited to worsening diabetes, worsening blood pressure, and eye infection.  ED Management:  The patient was emergently evaluated in the emergency department, her evaluation was significant for an elderly female with a normal neurologic exam.  Additionally the  patient's visual acuity is noted to be 20/30 bilaterally. The patient has no evidence of infection in her eye this time.  The patient does have a stye noted to the right upper eyelid as well. There is no evidence iritis, or acute glaucoma noted at present.  She is stable for discharge home.  She will follow up her revision her eye doctor for further care and treatment.  Additionally, she will be discharged home go to sleep with erythromycin eye ointment for her stye.            Scribe Attestation:   Scribe #1: I performed the above scribed service and the documentation accurately describes the services I performed. I attest to the accuracy of the note.           I, Dr. Javier Ferreira, personally performed the services described in this documentation. All medical record entries made by the scribe were at my direction and in my presence.  I have reviewed the chart and agree that the record reflects my personal performance and is accurate and complete. Javier Ferreira MD.  1:15 PM 12/12/2019          Clinical Impression:       ICD-10-CM ICD-9-CM   1. Hordeolum externum of right upper eyelid H00.011 373.11         Disposition:   Disposition: Discharged  Condition: Stable                     Javier Ferreira MD  12/12/19 4149

## 2019-12-12 NOTE — TELEPHONE ENCOUNTER
Called and spoke to patient. States she is currently at [facility] ER to be seen bor being off balance and sugar issues.

## 2019-12-12 NOTE — TELEPHONE ENCOUNTER
At 1404--blood glucose=216. No fever. No pain. No headache. No urine changes. Pt states she went to ED this am for blurry vision and high blood glucose. No treatment done per pt report. But did prescribe ointment for eye. Now vision is intermittently blurry and pt feels she cannot walk without losing her balance. No ear pain. No nausea. No vomiting. No diarrhea,   Not lighteaded or dizzy--cannot keep balance. Feels sleepy but is staying awake and speech is clear. Instructed pt and family member that MD nurse is to call her back n=but that pt can call 91 or go to nearest ED if she has any more concerns.   Pt verbalizes understanding all instructions, including protocol instructions.   Reason for Disposition   Blood glucose  mg/dl (3.5 -13 mmol/l)    Additional Information   Negative: Unconscious or difficult to awaken   Negative: Acting confused (e.g., disoriented, slurred speech)   Negative: Very weak (can't stand)   Negative: Sounds like a life-threatening emergency to the triager   Negative: Vomiting and signs of dehydration (e.g., very dry mouth, lightheaded, etc.)   Negative: Blood glucose > 240 mg/dl (13 mmol/l) and rapid breathing   Negative: Blood glucose > 500 mg/dl (27.5 mmol/l)   Negative: Blood glucose > 240 mg/dl (13 mmol/l) AND urine ketones moderate-large (or more than 1+)   Negative: Blood glucose > 240 mg/dl (13 mmol/l) and blood ketones > 1.5 mmol/l   Negative: Blood glucose > 240 mg/dl (13 mmol/l) AND vomiting AND unable to check for ketones (in blood or urine)   Negative: Vomiting lasting > 4 hours   Negative: Patient sounds very sick or weak to the triager   Negative: Fever > 100.5 F (38.1 C)   Negative: Caller has URGENT medication or insulin pump question and triager unable to answer question   Negative: Blood glucose > 400 mg/dl (22 mmol/l)   Negative: Blood glucose > 300 mg/dl (16.7 mmol/l) AND two or more times in a row   Negative: Urine ketones moderate - large (or  blood ketones > 1.5 mmol/l)   Negative: New-onset diabetes suspected (e.g., frequent urination, weak, weight loss)   Negative: Symptoms of high blood sugar (e.g., frequent urination, weak, weight loss) and not able to test blood glucose   Negative: Patient wants to be seen   Negative: Caller has NON-URGENT medication question about med that PCP prescribed and triager unable to answer question   Negative: Blood glucose > 240 mg/dl (13 mmol/l)    Protocols used: DIABETES - HIGH BLOOD SUGAR-A-OH

## 2019-12-12 NOTE — TELEPHONE ENCOUNTER
Reason for Disposition   Blood glucose > 400 mg/dl (22 mmol/l)    Additional Information   Negative: Unconscious or difficult to awaken   Negative: Acting confused (e.g., disoriented, slurred speech)   Negative: Very weak (can't stand)   Negative: Sounds like a life-threatening emergency to the triager   Negative: Vomiting and signs of dehydration (e.g., very dry mouth, lightheaded, etc.)   Negative: Blood glucose > 240 mg/dl (13 mmol/l) and rapid breathing   Negative: Blood glucose > 500 mg/dl (27.5 mmol/l)   Negative: Blood glucose > 240 mg/dl (13 mmol/l) AND urine ketones moderate-large (or more than 1+)   Negative: Blood glucose > 240 mg/dl (13 mmol/l) and blood ketones > 1.5 mmol/l   Negative: Blood glucose > 240 mg/dl (13 mmol/l) AND vomiting AND unable to check for ketones (in blood or urine)   Negative: Vomiting lasting > 4 hours   Negative: Patient sounds very sick or weak to the triager   Negative: Fever > 100.5 F (38.1 C)   Negative: Caller has URGENT medication or insulin pump question and triager unable to answer question    Protocols used: DIABETES - HIGH BLOOD SUGAR-A-OH  Pt called stated she don't know what to eat to bring her blood sugar down. Pt bs is 482 now. Pt stated she was in er earlier today with vision problems. Care advice recommend pt receive a call back from Md office within an hour. Please call pt and advise.

## 2019-12-12 NOTE — TELEPHONE ENCOUNTER
----- Message from Sari Nolan sent at 12/12/2019  2:58 PM CST -----  Contact: Patient  Type: Needs Medical Advice    Who Called:  Patient  Best Call Back Number:   Additional Information: Patient advised she was waiting on a call back from the nurse but never heard back. Calling to speak about diabetes issues.

## 2019-12-13 ENCOUNTER — CLINICAL SUPPORT (OUTPATIENT)
Dept: CARDIOLOGY | Facility: HOSPITAL | Age: 59
DRG: 066 | End: 2019-12-13
Attending: EMERGENCY MEDICINE
Payer: MEDICARE

## 2019-12-13 LAB
ALBUMIN SERPL BCP-MCNC: 3.5 G/DL (ref 3.5–5.2)
ALP SERPL-CCNC: 77 U/L (ref 55–135)
ALT SERPL W/O P-5'-P-CCNC: 15 U/L (ref 10–44)
ANION GAP SERPL CALC-SCNC: 7 MMOL/L (ref 8–16)
APTT PPP: 31.5 SEC (ref 23.6–33.3)
AST SERPL-CCNC: 17 U/L (ref 10–40)
BASOPHILS # BLD AUTO: 0.06 K/UL (ref 0–0.2)
BASOPHILS NFR BLD: 0.7 % (ref 0–1.9)
BILIRUB SERPL-MCNC: 1 MG/DL (ref 0.1–1)
BUN SERPL-MCNC: 22 MG/DL (ref 8–23)
CALCIUM SERPL-MCNC: 8.9 MG/DL (ref 8.7–10.5)
CHLORIDE SERPL-SCNC: 104 MMOL/L (ref 95–110)
CK MB SERPL-MCNC: 2.2 NG/ML (ref 0.1–6.5)
CO2 SERPL-SCNC: 26 MMOL/L (ref 23–29)
CREAT SERPL-MCNC: 0.9 MG/DL (ref 0.5–1.4)
DIFFERENTIAL METHOD: ABNORMAL
EOSINOPHIL # BLD AUTO: 0.2 K/UL (ref 0–0.5)
EOSINOPHIL NFR BLD: 2.1 % (ref 0–8)
ERYTHROCYTE [DISTWIDTH] IN BLOOD BY AUTOMATED COUNT: 13.9 % (ref 11.5–14.5)
EST. GFR  (AFRICAN AMERICAN): >60 ML/MIN/1.73 M^2
EST. GFR  (NON AFRICAN AMERICAN): >60 ML/MIN/1.73 M^2
ESTIMATED AVG GLUCOSE: 269 MG/DL (ref 68–131)
GLUCOSE SERPL-MCNC: 192 MG/DL (ref 70–110)
GLUCOSE SERPL-MCNC: 201 MG/DL (ref 70–110)
GLUCOSE SERPL-MCNC: 294 MG/DL (ref 70–110)
GLUCOSE SERPL-MCNC: 359 MG/DL (ref 70–110)
GLUCOSE SERPL-MCNC: 368 MG/DL (ref 70–110)
HBA1C MFR BLD HPLC: 11 % (ref 4.5–6.2)
HCT VFR BLD AUTO: 36 % (ref 37–48.5)
HGB BLD-MCNC: 12.6 G/DL (ref 12–16)
IMM GRANULOCYTES # BLD AUTO: 0.05 K/UL (ref 0–0.04)
IMM GRANULOCYTES NFR BLD AUTO: 0.6 % (ref 0–0.5)
INR PPP: 1.1
LYMPHOCYTES # BLD AUTO: 2.6 K/UL (ref 1–4.8)
LYMPHOCYTES NFR BLD: 30.8 % (ref 18–48)
MAGNESIUM SERPL-MCNC: 1.7 MG/DL (ref 1.6–2.6)
MCH RBC QN AUTO: 26.9 PG (ref 27–31)
MCHC RBC AUTO-ENTMCNC: 35 G/DL (ref 32–36)
MCV RBC AUTO: 77 FL (ref 82–98)
MONOCYTES # BLD AUTO: 0.8 K/UL (ref 0.3–1)
MONOCYTES NFR BLD: 9.6 % (ref 4–15)
NEUTROPHILS # BLD AUTO: 4.8 K/UL (ref 1.8–7.7)
NEUTROPHILS NFR BLD: 56.2 % (ref 38–73)
NRBC BLD-RTO: 0 /100 WBC
PHOSPHATE SERPL-MCNC: 3.4 MG/DL (ref 2.7–4.5)
PLATELET # BLD AUTO: 370 K/UL (ref 150–350)
PMV BLD AUTO: 10.4 FL (ref 9.2–12.9)
POTASSIUM SERPL-SCNC: 3.6 MMOL/L (ref 3.5–5.1)
PROT SERPL-MCNC: 7.2 G/DL (ref 6–8.4)
PROTHROMBIN TIME: 14.1 SEC (ref 10.6–14.8)
RBC # BLD AUTO: 4.69 M/UL (ref 4–5.4)
SODIUM SERPL-SCNC: 137 MMOL/L (ref 136–145)
TROPONIN I SERPL DL<=0.01 NG/ML-MCNC: 0.04 NG/ML (ref 0.02–0.04)
WBC # BLD AUTO: 8.48 K/UL (ref 3.9–12.7)

## 2019-12-13 PROCEDURE — 83735 ASSAY OF MAGNESIUM: CPT

## 2019-12-13 PROCEDURE — 84484 ASSAY OF TROPONIN QUANT: CPT

## 2019-12-13 PROCEDURE — 63600175 PHARM REV CODE 636 W HCPCS: Performed by: NURSE PRACTITIONER

## 2019-12-13 PROCEDURE — 97535 SELF CARE MNGMENT TRAINING: CPT

## 2019-12-13 PROCEDURE — 25000003 PHARM REV CODE 250: Performed by: NURSE PRACTITIONER

## 2019-12-13 PROCEDURE — 85730 THROMBOPLASTIN TIME PARTIAL: CPT

## 2019-12-13 PROCEDURE — 84100 ASSAY OF PHOSPHORUS: CPT

## 2019-12-13 PROCEDURE — 21400001 HC TELEMETRY ROOM

## 2019-12-13 PROCEDURE — 36415 COLL VENOUS BLD VENIPUNCTURE: CPT

## 2019-12-13 PROCEDURE — 94761 N-INVAS EAR/PLS OXIMETRY MLT: CPT

## 2019-12-13 PROCEDURE — 97161 PT EVAL LOW COMPLEX 20 MIN: CPT

## 2019-12-13 PROCEDURE — 93306 TTE W/DOPPLER COMPLETE: CPT

## 2019-12-13 PROCEDURE — 97116 GAIT TRAINING THERAPY: CPT

## 2019-12-13 PROCEDURE — 97165 OT EVAL LOW COMPLEX 30 MIN: CPT

## 2019-12-13 PROCEDURE — 82962 GLUCOSE BLOOD TEST: CPT

## 2019-12-13 PROCEDURE — 80053 COMPREHEN METABOLIC PANEL: CPT

## 2019-12-13 PROCEDURE — 83036 HEMOGLOBIN GLYCOSYLATED A1C: CPT

## 2019-12-13 PROCEDURE — 92523 SPEECH SOUND LANG COMPREHEN: CPT

## 2019-12-13 PROCEDURE — 85025 COMPLETE CBC W/AUTO DIFF WBC: CPT

## 2019-12-13 PROCEDURE — 85610 PROTHROMBIN TIME: CPT

## 2019-12-13 PROCEDURE — 92610 EVALUATE SWALLOWING FUNCTION: CPT

## 2019-12-13 PROCEDURE — 82553 CREATINE MB FRACTION: CPT

## 2019-12-13 RX ORDER — CLOPIDOGREL BISULFATE 75 MG/1
75 TABLET ORAL DAILY
Status: DISCONTINUED | OUTPATIENT
Start: 2019-12-14 | End: 2019-12-14 | Stop reason: HOSPADM

## 2019-12-13 RX ORDER — LABETALOL HYDROCHLORIDE 5 MG/ML
10 INJECTION, SOLUTION INTRAVENOUS EVERY 4 HOURS PRN
Status: DISCONTINUED | OUTPATIENT
Start: 2019-12-13 | End: 2019-12-14 | Stop reason: HOSPADM

## 2019-12-13 RX ORDER — CLOPIDOGREL BISULFATE 75 MG/1
600 TABLET ORAL ONCE
Status: COMPLETED | OUTPATIENT
Start: 2019-12-13 | End: 2019-12-13

## 2019-12-13 RX ORDER — LABETALOL HYDROCHLORIDE 5 MG/ML
10 INJECTION, SOLUTION INTRAVENOUS EVERY 4 HOURS PRN
Status: DISCONTINUED | OUTPATIENT
Start: 2019-12-13 | End: 2019-12-13

## 2019-12-13 RX ORDER — HYDRALAZINE HYDROCHLORIDE 20 MG/ML
10 INJECTION INTRAMUSCULAR; INTRAVENOUS EVERY 4 HOURS PRN
Status: DISCONTINUED | OUTPATIENT
Start: 2019-12-13 | End: 2019-12-14 | Stop reason: HOSPADM

## 2019-12-13 RX ADMIN — INSULIN ASPART 3 UNITS: 100 INJECTION, SOLUTION INTRAVENOUS; SUBCUTANEOUS at 04:12

## 2019-12-13 RX ADMIN — ATORVASTATIN CALCIUM 40 MG: 40 TABLET, FILM COATED ORAL at 10:12

## 2019-12-13 RX ADMIN — INSULIN ASPART 5 UNITS: 100 INJECTION, SOLUTION INTRAVENOUS; SUBCUTANEOUS at 01:12

## 2019-12-13 RX ADMIN — ASPIRIN 325 MG: 325 TABLET, COATED ORAL at 10:12

## 2019-12-13 RX ADMIN — INSULIN ASPART 3 UNITS: 100 INJECTION, SOLUTION INTRAVENOUS; SUBCUTANEOUS at 08:12

## 2019-12-13 RX ADMIN — CLOPIDOGREL BISULFATE 600 MG: 75 TABLET, FILM COATED ORAL at 07:12

## 2019-12-13 NOTE — ED NOTES
Pt states she is having blurry vision and states she feels off balance. Denies numbness or tingling to one side.

## 2019-12-13 NOTE — PLAN OF CARE
12/13/19 1611   POTTER Message   Medicare Outpatient and Observation Notification regarding financial responsibility Given to patient/caregiver;Explained to patient/caregiver;Signed/date by patient/caregiver   Date POTTER was signed 12/13/19   Time POTTER was signed 1550     Introduced self and asked pt if ok to take few min to discuss Medicare form, and ok with pt. Explained that pt in observation status and Medicare wants to inform them of the MOON form, pt verbalized an understanding to form, form signed by pt and form scanned into CM notes. Copy made of signed form for pt and copy delivered to pt.    5 Wishes Booklet discussed and given to pt and family.

## 2019-12-13 NOTE — PLAN OF CARE
12/12/19 7283   Patient Assessment/Suction   Level of Consciousness (AVPU) alert   Respiratory Effort Unlabored   Expansion/Accessory Muscles/Retractions no use of accessory muscles   All Lung Fields Breath Sounds clear;equal bilaterally   PRE-TX-O2   O2 Device (Oxygen Therapy) nasal cannula  (02 on S/B)   $ Is the patient on Low Flow Oxygen? Yes   SpO2 98 %   Pulse Oximetry Type Intermittent   $ Pulse Oximetry - Multiple Charge Pulse Oximetry - Multiple   Pulse 66   Respiratory Evaluation   $ Care Plan Tech Time 15 min   Evaluation For Transfer   Admitting Diagnosis CVA, HTN, DM

## 2019-12-13 NOTE — SUBJECTIVE & OBJECTIVE
Past Medical History:   Diagnosis Date    Arthritis     COPD (chronic obstructive pulmonary disease)     Diabetes mellitus     Hypertension     Wears glasses        Past Surgical History:   Procedure Laterality Date    COLONOSCOPY N/A 4/11/2017    Procedure: COLONOSCOPY;  Surgeon: Jared Antonio MD;  Location: [facility];  Service: Endoscopy;  Laterality: N/A;    HYSTERECTOMY      OOPHORECTOMY      SHOULDER SURGERY      R       Review of patient's allergies indicates:  No Known Allergies    No current facility-administered medications on file prior to encounter.      Current Outpatient Medications on File Prior to Encounter   Medication Sig    amlodipine-valsartan (EXFORGE)  mg per tablet Take 1 tablet by mouth once daily.    atorvastatin (LIPITOR) 20 MG tablet Take 1 tablet (20 mg total) by mouth once daily.    chlorthalidone (HYGROTEN) 25 MG Tab Take 2 tablets (50 mg total) by mouth once daily.    hydrALAZINE (APRESOLINE) 25 MG tablet Take 1 tablet (25 mg total) by mouth 3 (three) times daily.    HYDROcodone-acetaminophen (NORCO)  mg per tablet Take 1 tablet by mouth every 6 (six) hours as needed for Pain.    insulin aspart U-100 (NOVOLOG FLEXPEN U-100 INSULIN) 100 unit/mL (3 mL) InPn pen Novolog 6 u AC + correction Max TDD 35u (Patient taking differently: Inject 6-12 Units into the skin 3 (three) times daily before meals. Novolog 6 u AC + correction Max TDD 35u)    insulin degludec (TRESIBA FLEXTOUCH U-100) 100 unit/mL (3 mL) InPn Inject 30 Units into the skin once daily. (Patient taking differently: Inject 32 Units into the skin every evening. )    metoprolol succinate (TOPROL-XL) 200 MG 24 hr tablet Take 1 tablet (200 mg total) by mouth once daily.    blood sugar diagnostic Strp To check BG 2 times daily, to use with insurance preferred meter    cloNIDine 0.3 mg/24 hr td ptwk (CATAPRES) 0.3 mg/24 hr Place 1 patch onto the skin every 7 days.    ergocalciferol (ERGOCALCIFEROL)  "50,000 unit Cap Take 1 capsule (50,000 Units total) by mouth every 7 days.    fluticasone (FLONASE) 50 mcg/actuation nasal spray 1 spray (50 mcg total) by Each Nare route once daily. (Patient taking differently: 1 spray by Each Nostril route daily as needed. )    lancets Misc To check BG 2 times daily, to use with insurance preferred meter    pen needle, diabetic (BD MI 2ND GEN PEN NEEDLE) 32 gauge x 5/32" Ndle Uses 4 daily    umeclidinium (INCRUSE ELLIPTA) 62.5 mcg/actuation DsDv Inhale 1 each into the lungs once daily. Controller    [DISCONTINUED] BLOOD PRESSURE CUFF Misc 1 kit by Misc.(Non-Drug; Combo Route) route 3 (three) times daily.    [DISCONTINUED] blood pressure monitor (BLOOD PRESSURE KIT) Kit 1 kit by Misc.(Non-Drug; Combo Route) route 3 (three) times daily.    [DISCONTINUED] blood-glucose meter kit To check BG 2 times daily, to use with insurance preferred meter    [DISCONTINUED] clotrimazole-betamethasone 1-0.05% (LOTRISONE) cream Apply topically 2 (two) times daily.    [DISCONTINUED] diclofenac sodium (VOLTAREN) 1 % Gel Apply 2 g topically 4 (four) times daily.    [DISCONTINUED] erythromycin (ROMYCIN) ophthalmic ointment Place a 1/2 inch ribbon of ointment into the lower eyelid twice a day.    [DISCONTINUED] hydrocortisone 1 % cream Apply to affected area 2 times daily    [DISCONTINUED] tiZANidine (ZANAFLEX) 4 MG tablet TAKE 1 TABLET(4 MG) BY MOUTH EVERY 6 HOURS AS NEEDED FOR MUSCLE SPASM    [DISCONTINUED] triamcinolone acetonide 0.1% (KENALOG) 0.1 % Lotn Apply topically 2 (two) times daily.     Family History     Problem Relation (Age of Onset)    Anemia Daughter    Asthma Mother    Diabetes Mother, Father, Brother    Hypertension Mother, Brother        Tobacco Use    Smoking status: Never Smoker    Smokeless tobacco: Never Used   Substance and Sexual Activity    Alcohol use: No    Drug use: No    Sexual activity: Not Currently     Review of Systems   Constitutional: Positive for " fatigue. Negative for activity change, chills and fever.   HENT: Negative for congestion, drooling, ear pain, hearing loss, nosebleeds, rhinorrhea, sinus pressure, sore throat and trouble swallowing.    Eyes: Positive for visual disturbance. Negative for pain.   Respiratory: Negative for apnea, cough, choking, chest tightness, shortness of breath and wheezing.    Cardiovascular: Negative for chest pain, palpitations and leg swelling.   Gastrointestinal: Negative for abdominal distention, abdominal pain, blood in stool, constipation, diarrhea, nausea and vomiting.   Endocrine: Negative for polydipsia, polyphagia and polyuria.   Genitourinary: Negative for decreased urine volume, dysuria, flank pain, frequency and hematuria.   Musculoskeletal: Positive for gait problem. Negative for back pain, joint swelling, myalgias, neck pain and neck stiffness.   Skin: Negative for rash and wound.   Neurological: Positive for dizziness, weakness, light-headedness and numbness. Negative for tremors, seizures, syncope, facial asymmetry, speech difficulty and headaches.   Psychiatric/Behavioral: Negative for behavioral problems, confusion, hallucinations and sleep disturbance. The patient is not nervous/anxious.      Objective:     Vital Signs (Most Recent):  Temp: 97.8 °F (36.6 °C) (12/12/19 2300)  Pulse: 67 (12/12/19 2300)  Resp: 16 (12/12/19 2211)  BP: (!) 205/96 (12/12/19 2300)  SpO2: 98 % (12/12/19 2300) Vital Signs (24h Range):  Temp:  [97.8 °F (36.6 °C)-98.6 °F (37 °C)] 97.8 °F (36.6 °C)  Pulse:  [64-72] 67  Resp:  [12-18] 16  SpO2:  [97 %-100 %] 98 %  BP: (197-216)/(88-99) 205/96     Weight: 60.8 kg (134 lb 0.6 oz)  Body mass index is 27.07 kg/m².    Physical Exam   Constitutional: She is oriented to person, place, and time. She appears well-developed and well-nourished.   HENT:   Head: Atraumatic.   Eyes: Pupils are equal, round, and reactive to light. EOM are normal.   Neck: Normal range of motion. Neck supple.    Cardiovascular: Normal rate, regular rhythm, normal heart sounds and intact distal pulses.   Pulmonary/Chest: Effort normal and breath sounds normal.   Abdominal: Soft. Bowel sounds are normal.   Musculoskeletal: Normal range of motion.   Neurological: She is alert and oriented to person, place, and time.   Unable to adduct left eye, right eye abduction. PERRL + stye to right upper eyelid   Skin: Skin is warm and dry. Capillary refill takes less than 2 seconds.   Psychiatric: She has a normal mood and affect.         CRANIAL NERVES     CN III, IV, VI   Pupils are equal, round, and reactive to light.  Extraocular motions are normal.   Right pupil: Size: 3 mm. Shape: regular. Reactivity: brisk.   Left pupil: Size: 3 mm. Shape: regular. Reactivity: brisk.   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    CN V   Facial sensation intact.   Right facial sensation deficit: none  Left facial sensation deficit: none  Right corneal reflex: normal  Left corneal reflex: normal    CN VII   Facial expression full, symmetric.   Right facial weakness: none  Left facial weakness: none    CN VIII   CN VIII normal.   Hearing: intact    CN IX, X   CN IX normal.   Palate: symmetric  Right gag reflex: normal  Left gag reflex: normal    CN XI   CN XI normal.   Right sternocleidomastoid strength: normal  Left sternocleidomastoid strength: normal  Right trapezius strength: normal  Left trapezius strength: normal    CN XII   CN XII normal.   Tongue: not atrophic  Tongue deviation: none       Significant Labs:   CBC:   Recent Labs   Lab 12/12/19  1639   WBC 9.29   HGB 12.9   HCT 37.1   *     CMP:   Recent Labs   Lab 12/12/19  1639   *   K 4.1   CL 98   CO2 29   *   BUN 24*   CREATININE 1.3   CALCIUM 9.3   PROT 7.5   ALBUMIN 3.9   BILITOT 0.9   ALKPHOS 83   AST 17   ALT 15   ANIONGAP 8   EGFRNONAA 44.4*     Cardiac Markers: No results for input(s): CKMB, MYOGLOBIN, BNP, TROPISTAT in the last 48  hours.  Coagulation:   Recent Labs   Lab 12/12/19  1639   INR 1.0     Lactic Acid: No results for input(s): LACTATE in the last 48 hours.  Lipase: No results for input(s): LIPASE in the last 48 hours.  Lipid Panel:   Recent Labs   Lab 12/12/19  1639   CHOL 154   HDL 66   LDLCALC 63.2   TRIG 124   CHOLHDL 42.9     Magnesium: No results for input(s): MG in the last 48 hours.  Troponin: No results for input(s): TROPONINI in the last 48 hours.  TSH:   Recent Labs   Lab 12/12/19  1639   TSH 1.240     Urine Studies: No results for input(s): COLORU, APPEARANCEUA, PHUR, SPECGRAV, PROTEINUA, GLUCUA, KETONESU, BILIRUBINUA, OCCULTUA, NITRITE, UROBILINOGEN, LEUKOCYTESUR, RBCUA, WBCUA, BACTERIA, SQUAMEPITHEL, HYALINECASTS in the last 48 hours.    Invalid input(s): WRIGHTSUR  All pertinent labs within the past 24 hours have been reviewed.    Significant Imaging: I have reviewed all pertinent imaging results/findings within the past 24 hours.   X-ray Chest Pa And Lateral    Result Date: 12/12/2019  CLINICAL HISTORY: ([id number])58 y/o  (1960) F Stroke; TECHNIQUE: (A#[id number], exam time 12/12/2019 17:29) XR CHEST PA AND LATERAL [id number] COMPARISON: None available. FINDINGS: The lungs are clear. Costophrenic angles are seen without effusion. No pneumothorax is identified. The heart is normal in size. The mediastinum is within normal limits. Osseous structures show degenerative disc disease and degenerative changes in the shoulders. The visualized upper abdomen is unremarkable.     No acute cardiac or pulmonary process. Electronically signed by: Serg Brambila MD Date:    12/12/2019 Time:    17:32    Ct Head Without Contrast    Result Date: 12/12/2019  CLINICAL HISTORY: ([id number])58 y/o  (1960) F Acute visual deficit; TECHNIQUE: (A#[id number], exam time 12/12/2019 16:54) CT HEAD WITHOUT CONTRAST [id number] Axial CT of the brain without contrast using soft tissue and bone algorithm. CMS MANDATED QUALITY DATA - CT RADIATION - 436 All  CT scans at this facility utilize dose modulation, iterative reconstruction, and/or weight based dosing when appropriate to reduce radiation dose to as low as reasonably achievable. COMPARISON: CT most recently from 11/17/2018 FINDINGS: No acute intracranial hemorrhage, edema or mass effect, and no acute parenchymal abnormality. There is no hydrocephalus, herniation or midline shift, and the basal and suprasellar cisterns are within normal limits. The osseous structures show no acute skull fracture. Moderate periventricular deep cerebral white matter low attenuation  nonspecific findings which can be seen in any diffuse white matter process but most commonly associated with chronic microvascular ischemic disease. There are scattered atheromatous calcifications in the intracranial internal carotid arteries. Orbital contents appear within normal limits. External auditory canals are unremarkable. The visualized paranasal sinuses and mastoid air cells are essentially clear.     1. No acute intracranial process. 2. Chronic/involutional findings as noted above. Electronically signed by: Serg Brambila MD Date:    12/12/2019 Time:    16:55    Cta Head And Neck (xpd)    Result Date: 12/12/2019  CLINICAL HISTORY: ([id number])60 y/o  (1960) F CN III palsy; TECHNIQUE: (A#[id number], exam time 12/12/2019 19:36) CTA HEAD AND NECK (XPD) [id number] CT angiography of the head and neck was performed using thin axial slices respectively and reviewed in multiple planes. Two and three dimensional multiplanar reformatted images were generated and submitted to PACS. POST PROCESSING: (Vitrea) 3D advanced post-processing was performed by the interpreting physician using an independent workstation utilizing a combination of MIP and MPR techniques to better evaluate anatomy and disease process. 3D rendering was performed with physician participation and supervision. CONTRAST: 100  mL Omni 350 was injected intravenously. CMS MANDATED QUALITY  DATA - CT RADIATION - 436 All CT scans at this facility utilize dose modulation, iterative reconstruction, and/or weight based dosing when appropriate to reduce radiation dose to as low as reasonably achievable. CMS MANDATED QUALITY DATA - CAROTID - 195 All measurements and percent stenosis described below were determined using NASCET criteria or criteria similar to NASCET, as defined by the Society of Radiologists in Ultrasound Consensus Conference, Radiology, 2003 COMPARISON: None available. FINDINGS: CTA of the [place]: There is patency of the intracranial circulation including the bilateral internal carotid arteries, the carotid terminus, the A1 and M1 segments, the bilateral intracranial vertebral arteries, the basilar artery and its distal branches.  Both posterior communicating arteries are seen.  No aneurysm is identified within the limits of the technique. Conventional angiography is more sensitive for the detection of small aneurysms. CTA of the Neck: There is a three-vessel aortic arch. CTA of the neck demonstrates that the origins of the great vessels are widely patent. No aneurysm is seen. Note that the internal carotid arteries are tortuous bilaterally and appear to correspond the oropharynx (caution is advised if future procedural instrumentation is planned). RIGHT: Common carotid artery origin: Patent. Cervical segment: Patent. External carotid origin characteristics: Patent. Carotid bifurcation: Patent. Internal carotid origin characteristics: No focal stenosis. Vertebral artery: within normal limits. LEFT Common carotid artery origin: Patent. Cervical segment: Patent. External carotid origin characteristics: Patent. Carotid bifurcation: Patent. Internal carotid origin characteristics: No focal stenosis. Vertebral artery: within normal limits.     1.  No clinically significant stenosis, large vessel occlusion, or aneurysm is identified as outlined above. 2.  Tortuous internal carotid  arteries in the neck. Electronically signed by: Serg Brambila MD Date:    12/12/2019 Time:    19:41

## 2019-12-13 NOTE — ED PROVIDER NOTES
Encounter Date: 12/12/2019       History     Chief Complaint   Patient presents with    OFF BALANCE     WOKE UP THIS AM LIKE THIS    Eye Problem     59-year-old female with poorly-controlled hypertension, diabetes, COPD presents emergency department with diplopia.  The patient states that she woke up this morning with blurry vision.  She states that the blurry vision gets worse when she looks to the right.  She does state that she has a swollen upper eyelid but that has been going on for the past 1-2 days.  She states that throughout the day she also has started to feel as though she is drunk.  She states that she feels like she is going to fall over when she tries to walk.  She denies any difficulty with speech. She has no complaints of extremity weakness. She does complain of lower right-sided back pain that has been present for the past 2-3 weeks.  She states that she has intermittent numbness to her right lower extremity that has improved over the past week.  The patient denies any midline back pain, no fevers or chills, no history of IVDU or malignancy, no lower extremity weakness or numbness, no saddle anesthesia, no urinary retention or incontinence          Review of patient's allergies indicates:  No Known Allergies  Past Medical History:   Diagnosis Date    Arthritis     COPD (chronic obstructive pulmonary disease)     Diabetes mellitus     Hypertension     Wears glasses      Past Surgical History:   Procedure Laterality Date    COLONOSCOPY N/A 4/11/2017    Procedure: COLONOSCOPY;  Surgeon: Jared Antonio MD;  Location: [facility];  Service: Endoscopy;  Laterality: N/A;    HYSTERECTOMY      OOPHORECTOMY      SHOULDER SURGERY      R     Family History   Problem Relation Age of Onset    Diabetes Mother     Asthma Mother     Hypertension Mother     Diabetes Father     Diabetes Brother     Hypertension Brother     Anemia Daughter      Social History     Tobacco Use    Smoking status: Never  Smoker    Smokeless tobacco: Never Used   Substance Use Topics    Alcohol use: No    Drug use: No     Review of Systems   Constitutional: Negative for chills, diaphoresis, fatigue and fever.   HENT: Negative for congestion.    Eyes: Positive for visual disturbance.   Respiratory: Negative for cough, shortness of breath, wheezing and stridor.    Cardiovascular: Negative for chest pain and palpitations.   Gastrointestinal: Negative for abdominal distention, diarrhea, nausea and vomiting.   Genitourinary: Negative for dysuria, frequency, hematuria and urgency.   Musculoskeletal: Negative for back pain.   Skin: Negative for pallor.   Neurological: Positive for numbness. Negative for weakness, light-headedness and headaches.   Psychiatric/Behavioral: Negative for confusion.   All other systems reviewed and are negative.      Physical Exam     Initial Vitals [12/12/19 1555]   BP Pulse Resp Temp SpO2   (!) 216/93 71 16 98.6 °F (37 °C) 98 %      MAP       --         Physical Exam    Nursing note and vitals reviewed.  Constitutional: She appears well-developed and well-nourished. No distress.   HENT:   Head: Normocephalic and atraumatic.   Mouth/Throat: No oropharyngeal exudate.   Eyes: Conjunctivae are normal. Pupils are equal, round, and reactive to light.   Unable to adduct left eye, right eye with with abduction, pupils PERRL, no obvious ptosis; hordeolum to right upper lid   Neck: Normal range of motion. Neck supple.   Cardiovascular: Normal rate, regular rhythm, normal heart sounds and intact distal pulses.   Pulmonary/Chest: Breath sounds normal. She has no wheezes. She has no rhonchi. She has no rales.   Abdominal: Soft. Bowel sounds are normal. She exhibits no distension. There is no tenderness. There is no rebound.   Musculoskeletal: Normal range of motion.   Neurological: She is alert and oriented to person, place, and time. She has normal strength. No cranial nerve deficit or sensory deficit. GCS score is 15.  GCS eye subscore is 4. GCS verbal subscore is 5. GCS motor subscore is 6.   Isolated numbness to R mid lateral night, otherwise sensation intact; finger to nose, rapid alternating hand, heel to shin intact, Romberg negative, unable to walk in a straight line as the patient states she feels too unsteady   Skin: Skin is warm and dry. Capillary refill takes less than 2 seconds.   Psychiatric: Thought content normal.         ED Course   Procedures  Labs Reviewed   CBC W/ AUTO DIFFERENTIAL - Abnormal; Notable for the following components:       Result Value    Mean Corpuscular Volume 77 (*)     Mean Corpuscular Hemoglobin 26.8 (*)     Platelets 411 (*)     Immature Granulocytes 0.8 (*)     Immature Grans (Abs) 0.07 (*)     All other components within normal limits   COMPREHENSIVE METABOLIC PANEL - Abnormal; Notable for the following components:    Sodium 135 (*)     Glucose 315 (*)     BUN, Bld 24 (*)     eGFR if  51.2 (*)     eGFR if non  44.4 (*)     All other components within normal limits   POCT GLUCOSE - Abnormal; Notable for the following components:    POC Glucose 329 (*)     All other components within normal limits   PROTIME-INR   TSH   LIPID PANEL   POCT GLUCOSE MONITORING CONTINUOUS     EKG Readings: (Independently Interpreted)   Initial Reading: No STEMI.   Normal sinus rhythm at a rate of 66 beats per minute with T-wave inversions in 1, aVL, V4 through V6, normal intervals       Imaging Results          CTA Head and Neck (xpd) (Final result)  Result time 12/12/19 19:41:30    Final result by Serg Brambila MD (12/12/19 19:41:30)                 Impression:      1.  No clinically significant stenosis, large vessel occlusion, or aneurysm is identified as outlined above.    2.  Tortuous internal carotid arteries in the neck.      Electronically signed by: Serg Brambila MD  Date:    12/12/2019  Time:    19:41             Narrative:    CLINICAL HISTORY:  ([id number])60 y/o   (1960) F    CN III palsy;    TECHNIQUE:  (A#[id number], exam time 12/12/2019 19:36)    CTA HEAD AND NECK (XPD) [id number]    CT angiography of the head and neck was performed using thin axial slices respectively and reviewed in multiple planes. Two and three dimensional multiplanar reformatted images were generated and submitted to PACS.    POST PROCESSING:    (Vitrea) 3D advanced post-processing was performed by the interpreting physician using an independent workstation utilizing a combination of MIP and MPR techniques to better evaluate anatomy and disease process. 3D rendering was performed with physician participation and supervision.    CONTRAST:    100  mL Omni 350 was injected intravenously.    CMS MANDATED QUALITY DATA - CT RADIATION - 436    All CT scans at this facility utilize dose modulation, iterative reconstruction, and/or weight based dosing when appropriate to reduce radiation dose to as low as reasonably achievable.    CMS MANDATED QUALITY DATA - CAROTID - 195    All measurements and percent stenosis described below were determined using NASCET criteria or criteria similar to NASCET, as defined by the Society of Radiologists in Ultrasound Consensus Conference, Radiology, 2003    COMPARISON:  None available.    FINDINGS:  CTA of the [place]: There is patency of the intracranial circulation including the bilateral internal carotid arteries, the carotid terminus, the A1 and M1 segments, the bilateral intracranial vertebral arteries, the basilar artery and its distal branches.  Both posterior communicating arteries are seen.  No aneurysm is identified within the limits of the technique. Conventional angiography is more sensitive for the detection of small aneurysms.    CTA of the Neck:    There is a three-vessel aortic arch. CTA of the neck demonstrates that the origins of the great vessels are widely patent. No aneurysm is seen.    Note that the internal carotid arteries are tortuous  bilaterally and appear to correspond the oropharynx (caution is advised if future procedural instrumentation is planned).    RIGHT:    Common carotid artery origin: Patent.    Cervical segment: Patent.    External carotid origin characteristics: Patent.    Carotid bifurcation: Patent.    Internal carotid origin characteristics: No focal stenosis.    Vertebral artery: within normal limits.    LEFT    Common carotid artery origin: Patent.    Cervical segment: Patent.    External carotid origin characteristics: Patent.    Carotid bifurcation: Patent.    Internal carotid origin characteristics: No focal stenosis.    Vertebral artery: within normal limits.                               X-Ray Chest PA And Lateral (Final result)  Result time 12/12/19 17:32:41   Procedure changed from X-Ray Chest AP Portable     Final result by Serg Brambila MD (12/12/19 17:32:41)                 Impression:      No acute cardiac or pulmonary process.      Electronically signed by: Serg Brambila MD  Date:    12/12/2019  Time:    17:32             Narrative:    CLINICAL HISTORY:  ([id number])58 y/o  (1960) F    Stroke;    TECHNIQUE:  ([id number], exam time 12/12/2019 17:29)    XR CHEST PA AND LATERAL [id number]    COMPARISON:  None available.    FINDINGS:  The lungs are clear. Costophrenic angles are seen without effusion. No pneumothorax is identified. The heart is normal in size. The mediastinum is within normal limits. Osseous structures show degenerative disc disease and degenerative changes in the shoulders. The visualized upper abdomen is unremarkable.                               CT Head Without Contrast (Final result)  Result time 12/12/19 16:55:54    Final result by Serg Brambila MD (12/12/19 16:55:54)                 Impression:      1. No acute intracranial process.    2. Chronic/involutional findings as noted above.      Electronically signed by: Serg Brambila MD  Date:    12/12/2019  Time:    16:55              Narrative:    CLINICAL HISTORY:  ([id number])60 y/o  (1960) F    Acute visual deficit;    TECHNIQUE:  (A#[id number], exam time 12/12/2019 16:54)    CT HEAD WITHOUT CONTRAST [id number]    Axial CT of the brain without contrast using soft tissue and bone algorithm.    CMS MANDATED QUALITY DATA - CT RADIATION - 436    All CT scans at this facility utilize dose modulation, iterative reconstruction, and/or weight based dosing when appropriate to reduce radiation dose to as low as reasonably achievable.    COMPARISON:  CT most recently from 11/17/2018    FINDINGS:  No acute intracranial hemorrhage, edema or mass effect, and no acute parenchymal abnormality. There is no hydrocephalus, herniation or midline shift, and the basal and suprasellar cisterns are within normal limits. The osseous structures show no acute skull fracture. Moderate periventricular deep cerebral white matter low attenuation  nonspecific findings which can be seen in any diffuse white matter process but most commonly associated with chronic microvascular ischemic disease. There are scattered atheromatous calcifications in the intracranial internal carotid arteries. Orbital contents appear within normal limits. External auditory canals are unremarkable. The visualized paranasal sinuses and mastoid air cells are essentially clear.                              X-Rays:   Independently Interpreted Readings:   Chest X-Ray: No acute abnormalities.   Head CT: No acute stroke.     Medical Decision Making:   ED Management:  59-year-old female presents emergency department with isolated left cranial nerve 3 palsy.  Differential includes ischemia, aneurysm, infection, hypertensive emergency, hyperglycemia.  Patient's lab work is remarkable for a and isolated hyperglycemia without acidemia or anion gap.  She does have T-wave inversions on her EKG but no other signs of ischemia.  CT head is negative. The patient is hypertensive in the emergency department.  I did  discuss the case acutely with Dr. Mushtaq Kessler who has requested a CTA head and neck in the emergency department.  This is been obtained and is negative for acute clot although does show some internal carotid tortuosity.  The patient will require admission for further evaluation including MRI and echo.  At this time I will not treat her elevated blood pressure.  She will be allowed permissive hypertension at this time as I am concerned for ischemic stroke.    Teetee Negron MD  Emergency Medicine  12/12/2019 8:36 PM                     ED Course as of Dec 12 1946   Thu Dec 12, 2019   1555 Blurred vision since awakening this morning, seen at [facility] this morning for a similar complaint.     Referred to Ophthalmology but now endorsing a gait disturbance and weakness    [BF]      ED Course User Index  [BF] KLARISSA Wagner                Clinical Impression:       ICD-10-CM ICD-9-CM   1. Cranial nerve III palsy, left H49.02 378.51   2. Stroke I63.9 434.91   3. Uncontrolled hypertension I10 401.9   4. Hordeolum externum of right upper eyelid H00.011 373.11   5. Hyperglycemia R73.9 790.29                             Teetee Negron MD  12/12/19 2037

## 2019-12-13 NOTE — PLAN OF CARE
Plan of care and safety reviewed with patient. Pt is resting, free of injury during shift. Personal items and call light within reach. Will continue to monitor.

## 2019-12-13 NOTE — PLAN OF CARE
Plan of care reviewed. Neuro checks continue Q4 hours. Evaluated by therapies. Patient instructed to call for assistance with ambulation and verbalizes understanding. Will continue to monitor.   Problem: Adult Inpatient Plan of Care  Goal: Plan of Care Review  Outcome: Ongoing, Progressing  Goal: Patient-Specific Goal (Individualization)  Outcome: Ongoing, Progressing  Goal: Absence of Hospital-Acquired Illness or Injury  Outcome: Ongoing, Progressing  Goal: Optimal Comfort and Wellbeing  Outcome: Ongoing, Progressing  Goal: Readiness for Transition of Care  Outcome: Ongoing, Progressing  Goal: Rounds/Family Conference  Outcome: Ongoing, Progressing     Problem: Infection  Goal: Infection Symptom Resolution  Outcome: Ongoing, Progressing     Problem: Diabetes Comorbidity  Goal: Blood Glucose Level Within Desired Range  Outcome: Ongoing, Progressing     Problem: Fall Injury Risk  Goal: Absence of Fall and Fall-Related Injury  Outcome: Ongoing, Progressing

## 2019-12-13 NOTE — PLAN OF CARE
Problem: Occupational Therapy Goal  Goal: Occupational Therapy Goal  Description  Goals to be met by: d/c     Patient will increase functional independence with ADLs by performing:    UE Dressing with Modified [place].  LE Dressing with Modified [place].  Grooming while standing with Modified [place].  Toileting from toilet with Modified [place] for hygiene and clothing management.   Toilet transfer to toilet with Modified [place].     Outcome: Ongoing, Progressing

## 2019-12-13 NOTE — PT/OT/SLP EVAL
"Speech Language Pathology Evaluation  Cognitive/Bedside Swallow    Patient Name:  Steff Johnson   MRN:  [id number]  Admitting Diagnosis: CVA (cerebral vascular accident)    Recommendations:                  General Recommendations:  Dysphagia therapy and Cognitive-linguistic therapy  Diet recommendations:  Regular(as tolerated, following swallow precautions), Thin(single sips)   Aspiration Precautions: 1 bite/sip at a time, Avoid talking while eating, Eliminate distractions, HOB to 90 degrees and Small bites/sips ; meds one at a time as tolerated  General Precautions: Standard, aspiration(vision impairment)  Communication strategies:  eye glasses and provide increased time to answer    History:     Past Medical History:   Diagnosis Date    Arthritis     COPD (chronic obstructive pulmonary disease)     Diabetes mellitus     Hypertension     Wears glasses        Past Surgical History:   Procedure Laterality Date    COLONOSCOPY N/A 4/11/2017    Procedure: COLONOSCOPY;  Surgeon: Jared Antonio MD;  Location: [facility];  Service: Endoscopy;  Laterality: N/A;    HYSTERECTOMY      OOPHORECTOMY      SHOULDER SURGERY      R       Social History: Patient lives with family.        Chest X-Rays: 12/12/19--copied from Imaging section of chart.  FINDINGS:  The lungs are clear. Costophrenic angles are seen without effusion. No pneumothorax is identified. The heart is normal in size. The mediastinum is within normal limits. Osseous structures show degenerative disc disease and degenerative changes in the shoulders. The visualized upper abdomen is unremarkable.      Impression       No acute cardiac or pulmonary process.         Prior diet:regular per pt report    Occupation/hobbies/homemaking: cooking.    Subjective   " Only thing wrong with me is my eyes."    Patient goals: improved vision    Pain/Comfort:  · Pain Rating 1: 0/10    Objective:     Cognitive Status: Mild deficits  [facility] Mental Status Exam " (SLUMS): scored 15 of 30 points (mod-severe on this screening instrument, however pt very distracted by family in room); deficits noted in executive function, recall for new information, word fluency; alert and oriented x4; however she is not aware of any deficits other than related to vision.  Left/right intact.  Needed extra time and repetition to complete functional math tasks at 75%.       Receptive Language: WFL  Comprehension: followed two-step spoken instructions 90% accuracy; pointed to objects in print (large print) and objects in room. Responsive to questions.      Pragmatics:  appropriate      Expressive Language: WFL  Mildly reduced word fluency for naming in categories (named 14 animals in 60 seconds)  Verbal:  Naming of common objects intact. Speaks in complete sentences; conversation level.    Nonverbal: points with R hand        Motor Speech: WFL      Voice: WFL      Visual-Spatial: Impaired.   Abnormal placement of numerals on clock drawing task; pt reports she has double vision in right eye and impaired vision in left eye.  Wears glasses for everything (on during eval).      Reading: Impaired.  Read large print numerals, sentences, simple paragraph.  Difficulty decoding small print paragraph.      Written Expression: deferred      Oral Musculature Evaluation  · Oral Musculature: WFL(mild decrease in range of motion on Right cheek)  · Structural Abnormalities: n/a except some missing teeth  · Dentition: present and adequate(fair)  · Secretion Management: adequate  · Mucosal Quality: adequate  · Mandibular Strength and Mobility: WFL  · Oral Labial Strength and Mobility: WFL  · Lingual Strength and Mobility: WFL  · Velar Elevation: impaired(droop on right)  · Buccal Strength and Mobility: WFL(decreased retraction on right)  · Volitional Cough: present  · Volitional Swallow: present based on palpation  · Voice Prior to PO Intake: dry/clear  · Oral Musculature Comments: overall functional with mildly  reduced range in right cheek, mild R facial droop (patient unaware)    Bedside Swallow Eval:   Consistencies Assessed:  · Thin liquids (water by straw and cup sip)  · Puree (apple sauce)  · Solids (chuckie crackers)   (pt had completed reg consistency dinner tray before clinician entered room; good intake; denies difficulty swallowing; denies change from baseline)    Oral Phase:   · mildly extended mastication; full clearance  · WFL    Pharyngeal Phase:   · occasional throat clear after swallows of liquid; no coughing observed/no change in voice quality    Compensatory Strategies  · recommended to pt that she swallow hard and take one sip at a time    Treatment: pt educated re: swallow safety precautions; she verbalized understanding.    Assessment:     Steff Johnson is a 59 y.o. female with an SLP diagnosis of mild Dysphagia and mild Cognitive-Linguistic Impairment.  She presents with at least mild executive function deficits complicated by apparent visual deficits; mildly decreased word fluency; decreased recall for new information; needs extra time/assist with math computations.  She demonstrated orientation x4. Conversation level; no dysarthria; functional voice.    Goals:   Multidisciplinary Problems     SLP Goals        Problem: SLP Goal    Goal Priority Disciplines Outcome   SLP Goal     SLP    Description:  1. Pt will participate in further assessment of verbal problem solving and executive function.  2. Pt will demonstrate understanding of training provided for improving functional memory for new information.  3. Pt will demonstrate understanding and use/recall of swallow safety precautions (90 degree position, small sips/bites, eat slowly, signs to report to doctor).                    Plan:     · Patient to be seen:  4 x/week(visit tomorrow, then 4 visits next week)   · Plan of Care expires:  12/27/19  · Plan of Care reviewed with:      · SLP Follow-Up:  Yes       Discharge recommendations:  Discharge  Facility/Level of Care Needs: (cognitive-linguistic tx, dysphagia tx, home health vs outpt tx)   Barriers to Discharge:  Safety Awareness (pt not aware she has any deficits other than relating to vision)    Time Tracking:     SLP Treatment Date:   12/13/19  Speech Start Time:  1715  Speech Stop Time:  1740     Speech Total Time (min):  25 min    Billable Minutes: Eval 10  and Eval Swallow and Oral Function 10 (5 minutes swallow tx not billable)    Kailyn Real CCC-SLP  12/13/2019

## 2019-12-13 NOTE — PT/OT/SLP EVAL
Physical Therapy Evaluation    Patient Name:  Steff Johnson   MRN:  [id number]    Recommendations:     Discharge Recommendations:  home health PT   Discharge Equipment Recommendations: walker, rolling   Barriers to discharge: None    Assessment:     Steff Johnson is a 59 y.o. female admitted with a medical diagnosis of CVA (cerebral vascular accident).  She presents with the following impairments/functional limitations:  weakness, impaired functional mobilty, decreased safety awareness, gait instability. Pt required SBA for transfers. She ambulated 180' with no AD using HHA or railings in the dee. She had 2 mild LOBs to the right with ambulation using HHA. Pt then ambulated 100' with trial of RW. Pt did much better with gait with RW with no LOB. Discussed with CM that patient will need a RW upon D/C.     Rehab Prognosis: Good; patient would benefit from acute skilled PT services to address these deficits and reach maximum level of function.    Recent Surgery: * No surgery found *      Plan:     During this hospitalization, patient to be seen 6 x/week to address the identified rehab impairments via gait training, therapeutic activities, therapeutic exercises and progress toward the following goals:    · Plan of Care Expires:  01/13/20    Subjective     Chief Complaint: none  Patient/Family Comments/goals: home  Pain/Comfort:  · Pain Rating 1: 0/10    Patients cultural, spiritual, Restorationist conflicts given the current situation:      Living Environment:  Pt lives at home with her  with no stairs  Prior to admission, patients level of function was indpendent. She is on disability and does not work. Equipment used at home: none.  DME owned (not currently used): none.  Upon discharge, patient will have assistance from .    Objective:     Communicated with RN prior to session.  Patient found HOB elevated with telemetry  upon PT entry to room.    General Precautions: Standard, fall   Orthopedic  Precautions:N/A   Braces: N/A     Exams:  · Cognitive Exam:  Patient is oriented to Person, Place, Time and Situation  · Fine Motor Coordination:    · -       Intact  Left hand, finger to nose, Right hand, finger to nose, Left hand, diadochokinesis skill  and Right hand, diadochokinesis skill; did have some trouble with alternating supination and pronation   · RLE Strength: WFL  · LLE Strength: WFL    Functional Mobility:  · Bed Mobility:     · Supine to Sit: stand by assistance  · Transfers:     · Sit to Stand:  stand by assistance with no AD  · Gait: 180' HHA 2 mild LOB to the right; 100' RW CGA with no LOB  · Balance: fair      Therapeutic Activities and Exercises:   Patient was educated on the importance of OOB activity during hospital stay, safe transfers and ambulation     AM-PAC 6 CLICK MOBILITY  Total Score:22     Patient left HOB elevated with call button in reach, RN notified and  present.    GOALS:   Multidisciplinary Problems     Physical Therapy Goals        Problem: Physical Therapy Goal    Goal Priority Disciplines Outcome Goal Variances Interventions   Physical Therapy Goal     PT, PT/OT      Description:  Goals to be met by: D/C     Patient will increase functional independence with mobility by performin. Supine to sit with Stand-by Assistance  2. Sit to stand transfer with Supervision  3. Bed to chair transfer with Supervision using Rolling Walker  4. Gait  x 250 feet with Supervision using Rolling Walker with no LOB.                       History:     Past Medical History:   Diagnosis Date    Arthritis     COPD (chronic obstructive pulmonary disease)     Diabetes mellitus     Hypertension     Wears glasses        Past Surgical History:   Procedure Laterality Date    COLONOSCOPY N/A 2017    Procedure: COLONOSCOPY;  Surgeon: Jared Antonio MD;  Location: [facility];  Service: Endoscopy;  Laterality: N/A;    HYSTERECTOMY      OOPHORECTOMY      SHOULDER SURGERY      R        Time Tracking:     PT Received On: 12/13/19  PT Start Time: 1049     PT Stop Time: 1112  PT Total Time (min): 23 min     Billable Minutes: Evaluation 10 and Gait Training 13      Ashia Mcpherson PT  12/13/2019

## 2019-12-13 NOTE — HPI
59 year old  female presents to emergency room with complaints of double vision.      The patient states she has been having double vision and blurred vision for the past 1-2 days.  The patient went to [facility] in [place] and was seen in the emergency room and was diagnosed with a stye to her right eyelid and given of antibiotic ointment and discharged.  The patient states later that day she began to feel dizziness lightheadedness.  The patient states she felt as though she was drunk.  She denies use of any alcohol.  The patient states every time she tried a walk she feel as if she is going to fall over.  She denied headache or head trauma.  She describes her symptoms as moderate to severe severity with no exacerbating or alleviating factors.      Past medical history significant for suboptimally controlled hypertension, non-insulin-dependent type 2 diabetes, hyperlipidemia and arthritis

## 2019-12-13 NOTE — NURSING
Spoke with Radiologist regarding pt MRI result. DR Griffin was notified and orders were received. Will continue to monitor pt.

## 2019-12-13 NOTE — PT/OT/SLP EVAL
Occupational Therapy   Evaluation    Name: Steff Johnson  MRN: [id number]  Admitting Diagnosis:  CVA (cerebral vascular accident)      Recommendations:     Discharge Recommendations: home health OT  Discharge Equipment Recommendations:  walker, rolling, shower chair  Barriers to discharge:  None    Assessment:     Steff Johnson is a 59 y.o. female with a medical diagnosis of CVA (cerebral vascular accident).  She presents with impaired standing balance and veers to the right during functional mobility.  She was noted to demonstrate improved stability and less veering when using RW.  She reports continued double vision which adds to her fall risk.  Performance deficits affecting function: gait instability, impaired balance, impaired self care skills, impaired functional mobilty, visual deficits.      Rehab Prognosis: Good; patient would benefit from acute skilled OT services to address these deficits and reach maximum level of function.       Plan:     Patient to be seen 6 x/week to address the above listed problems via self-care/home management, therapeutic activities, therapeutic exercises, neuromuscular re-education  · Plan of Care Expires: 01/12/20  · Plan of Care Reviewed with: patient    Subjective     Chief Complaint: double vision  Patient/Family Comments/goals: return francie    Occupational Profile:  Living Environment: Pt lives in a single story home with 0STE.  She lives with her .  The bathroom has a regular tub/shower combination.  Previous level of function: Independent; on disability   Roles and Routines: homemaker; caretaker for self  Equipment Used at Home:  none  Assistance upon Discharge: spouse    Pain/Comfort:  · Pain Rating 1: 0/10  · Pain Rating Post-Intervention 1: 0/10    Objective:     Communicated with: RN prior to session.  Patient found supine with telemetry upon OT entry to room.    General Precautions: Standard, fall   Orthopedic Precautions:N/A   Braces: N/A     Occupational  Performance:    Bed Mobility:    · Patient completed Supine to Sit with supervision  · Patient completed Sit to Supine with supervision    Functional Mobility/Transfers:  · Patient completed Sit <> Stand Transfer with contact guard assistance  with  no assistive device   · Functional Mobility: Pt initially walked in the room with no AD, but demonstrated a significant veer to the right; pt was given a RW and demonstrated improved ability to stay in midline and maintain stability with close SBA; c/o continued double vision    Activities of Daily Living:  · Lower Body Dressing: pt doffed/donned socks while seated EOB with SBA      Cognitive/Visual Perceptual:  Cognitive/Psychosocial Skills:     -       Oriented to: Person, Place, Time and Situation   -       Follows Commands/attention:Follows multistep  commands  -       Communication: clear/fluent  -       Memory: No Deficits noted  -       Safety awareness/insight to disability: intact   -       Mood/Affect/Coping skills/emotional control: Appropriate to situation    Physical Exam:  Sensation:    -       Intact  Motor Planning:    -       WFL  Dominant hand:    -       Right  Upper Extremity Range of Motion:     -       Right Upper Extremity: WFL  -       Left Upper Extremity: WFL  Upper Extremity Strength:    -       Right Upper Extremity: 4/5 grossly  -       Left Upper Extremity: 4/5 grossly   Strength:    -       Right Upper Extremity: WNL  -       Left Upper Extremity: WNL  Fine Motor Coordination:    -       Intact  Gross motor coordination:   WFL    AMPAC 6 Click ADL:  AMPAC Total Score: 21    Treatment & Education:  OT role/POC  Education:    Patient left supine with all lines intact, call button in reach and RN notified    GOALS:   Multidisciplinary Problems     Occupational Therapy Goals        Problem: Occupational Therapy Goal    Goal Priority Disciplines Outcome Interventions   Occupational Therapy Goal     OT, PT/OT Ongoing, Progressing     Description:  Goals to be met by: d/c     Patient will increase functional independence with ADLs by performing:    UE Dressing with [employer] [facility].  LE Dressing with [employer] [facility].  Grooming while standing with [employer] [facility].  Toileting from toilet with Modified [facility] for hygiene and clothing management.   Toilet transfer to toilet with Modified [facility].                      History:     Past Medical History:   Diagnosis Date    Arthritis     COPD (chronic obstructive pulmonary disease)     Diabetes mellitus     Hypertension     Wears glasses        Past Surgical History:   Procedure Laterality Date    COLONOSCOPY N/A 4/11/2017    Procedure: COLONOSCOPY;  Surgeon: Jared Antonio MD;  Location: [facility];  Service: Endoscopy;  Laterality: N/A;    HYSTERECTOMY      OOPHORECTOMY      SHOULDER SURGERY      R       Time Tracking:     OT Date of Treatment: 12/13/19  OT Start Time: 1333  OT Stop Time: 1357  OT Total Time (min): 24 min    Billable Minutes:Evaluation 11  Self Care/Home Management 13    Shashi Michel, OT  12/13/2019

## 2019-12-13 NOTE — H&P
[facility] Medicine  History & Physical    Patient Name: Steff Johnson  MRN: [id number]  Admission Date: 12/12/2019  Attending Physician: Gato Griffin MD   Primary Care Provider: Cristina Ren MD         Patient information was obtained from patient, spouse/SO and ER records.     Subjective:     Principal Problem:CVA (cerebral vascular accident)    Chief Complaint:   Chief Complaint   Patient presents with    OFF BALANCE     WOKE UP THIS AM LIKE THIS    Eye Problem        HPI:   59 year old  female presents to emergency room with complaints of double vision.      The patient states she has been having double vision and blurred vision for the past 1-2 days.  The patient went to [facility] in [place] and was seen in the emergency room and was diagnosed with a stye to her right eyelid and given of antibiotic ointment and discharged.  The patient states later that day she began to feel dizziness lightheadedness.  The patient states she felt as though she was drunk.  She denies use of any alcohol.  The patient states every time she tried a walk she feel as if she is going to fall over.  She denied headache or head trauma.  She describes her symptoms as moderate to severe severity with no exacerbating or alleviating factors.      Past medical history significant for suboptimally controlled hypertension, non-insulin-dependent type 2 diabetes, hyperlipidemia and arthritis          Past Medical History:   Diagnosis Date    Arthritis     COPD (chronic obstructive pulmonary disease)     Diabetes mellitus     Hypertension     Wears glasses        Past Surgical History:   Procedure Laterality Date    COLONOSCOPY N/A 4/11/2017    Procedure: COLONOSCOPY;  Surgeon: Jared Antonio MD;  Location: [facility];  Service: Endoscopy;  Laterality: N/A;    HYSTERECTOMY      OOPHORECTOMY      SHOULDER SURGERY      R       Review of patient's allergies indicates:  No Known  "Allergies    No current facility-administered medications on file prior to encounter.      Current Outpatient Medications on File Prior to Encounter   Medication Sig    amlodipine-valsartan (EXFORGE)  mg per tablet Take 1 tablet by mouth once daily.    atorvastatin (LIPITOR) 20 MG tablet Take 1 tablet (20 mg total) by mouth once daily.    chlorthalidone (HYGROTEN) 25 MG Tab Take 2 tablets (50 mg total) by mouth once daily.    hydrALAZINE (APRESOLINE) 25 MG tablet Take 1 tablet (25 mg total) by mouth 3 (three) times daily.    HYDROcodone-acetaminophen (NORCO)  mg per tablet Take 1 tablet by mouth every 6 (six) hours as needed for Pain.    insulin aspart U-100 (NOVOLOG FLEXPEN U-100 INSULIN) 100 unit/mL (3 mL) InPn pen Novolog 6 u AC + correction Max TDD 35u (Patient taking differently: Inject 6-12 Units into the skin 3 (three) times daily before meals. Novolog 6 u AC + correction Max TDD 35u)    insulin degludec (TRESIBA FLEXTOUCH U-100) 100 unit/mL (3 mL) InPn Inject 30 Units into the skin once daily. (Patient taking differently: Inject 32 Units into the skin every evening. )    metoprolol succinate (TOPROL-XL) 200 MG 24 hr tablet Take 1 tablet (200 mg total) by mouth once daily.    blood sugar diagnostic Strp To check BG 2 times daily, to use with insurance preferred meter    cloNIDine 0.3 mg/24 hr td ptwk (CATAPRES) 0.3 mg/24 hr Place 1 patch onto the skin every 7 days.    ergocalciferol (ERGOCALCIFEROL) 50,000 unit Cap Take 1 capsule (50,000 Units total) by mouth every 7 days.    fluticasone (FLONASE) 50 mcg/actuation nasal spray 1 spray (50 mcg total) by Each Nare route once daily. (Patient taking differently: 1 spray by Each Nostril route daily as needed. )    lancets Misc To check BG 2 times daily, to use with insurance preferred meter    pen needle, diabetic (BD MI 2ND GEN PEN NEEDLE) 32 gauge x 5/32" Ndle Uses 4 daily    umeclidinium (INCRUSE ELLIPTA) 62.5 mcg/actuation DsDv " Inhale 1 each into the lungs once daily. Controller    [DISCONTINUED] BLOOD PRESSURE CUFF Misc 1 kit by Misc.(Non-Drug; Combo Route) route 3 (three) times daily.    [DISCONTINUED] blood pressure monitor (BLOOD PRESSURE KIT) Kit 1 kit by Misc.(Non-Drug; Combo Route) route 3 (three) times daily.    [DISCONTINUED] blood-glucose meter kit To check BG 2 times daily, to use with insurance preferred meter    [DISCONTINUED] clotrimazole-betamethasone 1-0.05% (LOTRISONE) cream Apply topically 2 (two) times daily.    [DISCONTINUED] diclofenac sodium (VOLTAREN) 1 % Gel Apply 2 g topically 4 (four) times daily.    [DISCONTINUED] erythromycin (ROMYCIN) ophthalmic ointment Place a 1/2 inch ribbon of ointment into the lower eyelid twice a day.    [DISCONTINUED] hydrocortisone 1 % cream Apply to affected area 2 times daily    [DISCONTINUED] tiZANidine (ZANAFLEX) 4 MG tablet TAKE 1 TABLET(4 MG) BY MOUTH EVERY 6 HOURS AS NEEDED FOR MUSCLE SPASM    [DISCONTINUED] triamcinolone acetonide 0.1% (KENALOG) 0.1 % Lotn Apply topically 2 (two) times daily.     Family History     Problem Relation (Age of Onset)    Anemia Daughter    Asthma Mother    Diabetes Mother, Father, Brother    Hypertension Mother, Brother        Tobacco Use    Smoking status: Never Smoker    Smokeless tobacco: Never Used   Substance and Sexual Activity    Alcohol use: No    Drug use: No    Sexual activity: Not Currently     Review of Systems   Constitutional: Positive for fatigue. Negative for activity change, chills and fever.   HENT: Negative for congestion, drooling, ear pain, hearing loss, nosebleeds, rhinorrhea, sinus pressure, sore throat and trouble swallowing.    Eyes: Positive for visual disturbance. Negative for pain.   Respiratory: Negative for apnea, cough, choking, chest tightness, shortness of breath and wheezing.    Cardiovascular: Negative for chest pain, palpitations and leg swelling.   Gastrointestinal: Negative for abdominal distention,  abdominal pain, blood in stool, constipation, diarrhea, nausea and vomiting.   Endocrine: Negative for polydipsia, polyphagia and polyuria.   Genitourinary: Negative for decreased urine volume, dysuria, flank pain, frequency and hematuria.   Musculoskeletal: Positive for gait problem. Negative for back pain, joint swelling, myalgias, neck pain and neck stiffness.   Skin: Negative for rash and wound.   Neurological: Positive for dizziness, weakness, light-headedness and numbness. Negative for tremors, seizures, syncope, facial asymmetry, speech difficulty and headaches.   Psychiatric/Behavioral: Negative for behavioral problems, confusion, hallucinations and sleep disturbance. The patient is not nervous/anxious.      Objective:     Vital Signs (Most Recent):  Temp: 97.8 °F (36.6 °C) (12/12/19 2300)  Pulse: 67 (12/12/19 2300)  Resp: 16 (12/12/19 2211)  BP: (!) 205/96 (12/12/19 2300)  SpO2: 98 % (12/12/19 2300) Vital Signs (24h Range):  Temp:  [97.8 °F (36.6 °C)-98.6 °F (37 °C)] 97.8 °F (36.6 °C)  Pulse:  [64-72] 67  Resp:  [12-18] 16  SpO2:  [97 %-100 %] 98 %  BP: (197-216)/(88-99) 205/96     Weight: 60.8 kg (134 lb 0.6 oz)  Body mass index is 27.07 kg/m².    Physical Exam   Constitutional: She is oriented to person, place, and time. She appears well-developed and well-nourished.   HENT:   Head: Atraumatic.   Eyes: Pupils are equal, round, and reactive to light. EOM are normal.   Neck: Normal range of motion. Neck supple.   Cardiovascular: Normal rate, regular rhythm, normal heart sounds and intact distal pulses.   Pulmonary/Chest: Effort normal and breath sounds normal.   Abdominal: Soft. Bowel sounds are normal.   Musculoskeletal: Normal range of motion.   Neurological: She is alert and oriented to person, place, and time.   Unable to adduct left eye, right eye abduction. PERRL + stye to right upper eyelid   Skin: Skin is warm and dry. Capillary refill takes less than 2 seconds.   Psychiatric: She has a normal mood  and affect.         CRANIAL NERVES     CN III, IV, VI   Pupils are equal, round, and reactive to light.  Extraocular motions are normal.   Right pupil: Size: 3 mm. Shape: regular. Reactivity: brisk.   Left pupil: Size: 3 mm. Shape: regular. Reactivity: brisk.   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    CN V   Facial sensation intact.   Right facial sensation deficit: none  Left facial sensation deficit: none  Right corneal reflex: normal  Left corneal reflex: normal    CN VII   Facial expression full, symmetric.   Right facial weakness: none  Left facial weakness: none    CN VIII   CN VIII normal.   Hearing: intact    CN IX, X   CN IX normal.   Palate: symmetric  Right gag reflex: normal  Left gag reflex: normal    CN XI   CN XI normal.   Right sternocleidomastoid strength: normal  Left sternocleidomastoid strength: normal  Right trapezius strength: normal  Left trapezius strength: normal    CN XII   CN XII normal.   Tongue: not atrophic  Tongue deviation: none       Significant Labs:   CBC:   Recent Labs   Lab 12/12/19  1639   WBC 9.29   HGB 12.9   HCT 37.1   *     CMP:   Recent Labs   Lab 12/12/19  1639   *   K 4.1   CL 98   CO2 29   *   BUN 24*   CREATININE 1.3   CALCIUM 9.3   PROT 7.5   ALBUMIN 3.9   BILITOT 0.9   ALKPHOS 83   AST 17   ALT 15   ANIONGAP 8   EGFRNONAA 44.4*     Cardiac Markers: No results for input(s): CKMB, MYOGLOBIN, BNP, TROPISTAT in the last 48 hours.  Coagulation:   Recent Labs   Lab 12/12/19  1639   INR 1.0     Lactic Acid: No results for input(s): LACTATE in the last 48 hours.  Lipase: No results for input(s): LIPASE in the last 48 hours.  Lipid Panel:   Recent Labs   Lab 12/12/19  1639   CHOL 154   HDL 66   LDLCALC 63.2   TRIG 124   CHOLHDL 42.9     Magnesium: No results for input(s): MG in the last 48 hours.  Troponin: No results for input(s): TROPONINI in the last 48 hours.  TSH:   Recent Labs   Lab 12/12/19  1639   TSH 1.240     Urine Studies: No  results for input(s): COLORU, APPEARANCEUA, PHUR, SPECGRAV, PROTEINUA, GLUCUA, KETONESU, BILIRUBINUA, OCCULTUA, NITRITE, UROBILINOGEN, LEUKOCYTESUR, RBCUA, WBCUA, BACTERIA, SQUAMEPITHEL, HYALINECASTS in the last 48 hours.    Invalid input(s): WRIGHTSUR  All pertinent labs within the past 24 hours have been reviewed.    Significant Imaging: I have reviewed all pertinent imaging results/findings within the past 24 hours.   X-ray Chest Pa And Lateral    Result Date: 12/12/2019  CLINICAL HISTORY: ([id number])60 y/o  (1960) F Stroke; TECHNIQUE: (A#[id number], exam time 12/12/2019 17:29) XR CHEST PA AND LATERAL [id number] COMPARISON: None available. FINDINGS: The lungs are clear. Costophrenic angles are seen without effusion. No pneumothorax is identified. The heart is normal in size. The mediastinum is within normal limits. Osseous structures show degenerative disc disease and degenerative changes in the shoulders. The visualized upper abdomen is unremarkable.     No acute cardiac or pulmonary process. Electronically signed by: Serg Brambila MD Date:    12/12/2019 Time:    17:32    Ct Head Without Contrast    Result Date: 12/12/2019  CLINICAL HISTORY: ([id number])60 y/o  (1960) F Acute visual deficit; TECHNIQUE: (A#[id number], exam time 12/12/2019 16:54) CT HEAD WITHOUT CONTRAST [id number] Axial CT of the brain without contrast using soft tissue and bone algorithm. CMS MANDATED QUALITY DATA - CT RADIATION - 436 All CT scans at this facility utilize dose modulation, iterative reconstruction, and/or weight based dosing when appropriate to reduce radiation dose to as low as reasonably achievable. COMPARISON: CT most recently from 11/17/2018 FINDINGS: No acute intracranial hemorrhage, edema or mass effect, and no acute parenchymal abnormality. There is no hydrocephalus, herniation or midline shift, and the basal and suprasellar cisterns are within normal limits. The osseous structures show no acute skull fracture. Moderate  periventricular deep cerebral white matter low attenuation  nonspecific findings which can be seen in any diffuse white matter process but most commonly associated with chronic microvascular ischemic disease. There are scattered atheromatous calcifications in the intracranial internal carotid arteries. Orbital contents appear within normal limits. External auditory canals are unremarkable. The visualized paranasal sinuses and mastoid air cells are essentially clear.     1. No acute intracranial process. 2. Chronic/involutional findings as noted above. Electronically signed by: Serg Brambila MD Date:    12/12/2019 Time:    16:55    Cta Head And Neck (xpd)    Result Date: 12/12/2019  CLINICAL HISTORY: ([id number])60 y/o  (1960) F CN III palsy; TECHNIQUE: (A#[id number], exam time 12/12/2019 19:36) CTA HEAD AND NECK (XPD) [id number] CT angiography of the head and neck was performed using thin axial slices respectively and reviewed in multiple planes. Two and three dimensional multiplanar reformatted images were generated and submitted to PACS. POST PROCESSING: (Vitrea) 3D advanced post-processing was performed by the interpreting physician using an independent workstation utilizing a combination of MIP and MPR techniques to better evaluate anatomy and disease process. 3D rendering was performed with physician participation and supervision. CONTRAST: 100  mL Omni 350 was injected intravenously. CMS MANDATED QUALITY DATA - CT RADIATION - 436 All CT scans at this facility utilize dose modulation, iterative reconstruction, and/or weight based dosing when appropriate to reduce radiation dose to as low as reasonably achievable. CMS MANDATED QUALITY DATA - CAROTID - 195 All measurements and percent stenosis described below were determined using NASCET criteria or criteria similar to NASCET, as defined by the Society of Radiologists in Ultrasound Consensus Conference, Radiology, 2003 COMPARISON: None available. FINDINGS: CTA  of the [place]: There is patency of the intracranial circulation including the bilateral internal carotid arteries, the carotid terminus, the A1 and M1 segments, the bilateral intracranial vertebral arteries, the basilar artery and its distal branches.  Both posterior communicating arteries are seen.  No aneurysm is identified within the limits of the technique. Conventional angiography is more sensitive for the detection of small aneurysms. CTA of the Neck: There is a three-vessel aortic arch. CTA of the neck demonstrates that the origins of the great vessels are widely patent. No aneurysm is seen. Note that the internal carotid arteries are tortuous bilaterally and appear to correspond the oropharynx (caution is advised if future procedural instrumentation is planned). RIGHT: Common carotid artery origin: Patent. Cervical segment: Patent. External carotid origin characteristics: Patent. Carotid bifurcation: Patent. Internal carotid origin characteristics: No focal stenosis. Vertebral artery: within normal limits. LEFT Common carotid artery origin: Patent. Cervical segment: Patent. External carotid origin characteristics: Patent. Carotid bifurcation: Patent. Internal carotid origin characteristics: No focal stenosis. Vertebral artery: within normal limits.     1.  No clinically significant stenosis, large vessel occlusion, or aneurysm is identified as outlined above. 2.  Tortuous internal carotid arteries in the neck. Electronically signed by: Serg Brambila MD Date:    12/12/2019 Time:    19:41    Assessment/Plan:     * CVA (cerebral vascular accident)  -stroke protocol  -ASA/statin/0 2  -consult Neurology  -PT/OT/ST eval and treat  -fall precautions      Cranial nerve III palsy, left  -consult Neurology, MRI of the brain ordered  -CT of the head and neck performed in the ED      Type 2 diabetes mellitus with hyperglycemia, with long-term current use of insulin  -low dose NovoLog insulin sliding  scale  -Accu-Cheks AC and HS  -diabetic cardiac diet      Hypertension associated with diabetes  -continue home pharmacologic regime to manage blood pressure is      Mixed hyperlipidemia  -doses statin increased          Twelve lead EKG shows her on normal sinus rhythm with a normal axis good R-wave progression, LVH by voltage lateral ST depression with T-wave inversion rate 66  milliseconds  VTE Risk Mitigation (From admission, onward)         Ordered     IP VTE LOW RISK PATIENT  Once      12/12/19 2123     Place sequential compression device  Until discontinued      12/12/19 2123                   Ashlee Damon NP  Department of Hospital Medicine   [facility]

## 2019-12-13 NOTE — PLAN OF CARE
Initial cognitive-linguistic and bedside swallow evals completed.  Recall deficits for new information noted, as well as at least mild executive function/attention to task deficits.  Will follow.  Pt not aware of her mild cognitive deficits.    Goals:       Multidisciplinary Problems           SLP Goals                 Problem: SLP Goal      Goal Priority Disciplines Outcome   SLP Goal      SLP     Description:  1. Pt will participate in further assessment of verbal problem solving and executive function.  2. Pt will demonstrate understanding of training provided for improving functional memory for new information.  3. Pt will demonstrate understanding and use/recall of swallow safety precautions (90 degree position, small sips/bites, eat slowly, signs to report to doctor).

## 2019-12-13 NOTE — ASSESSMENT & PLAN NOTE
-stroke protocol  -ASA/statin/0 2  -consult Neurology  -PT/OT/ST eval and treat  -fall precautions

## 2019-12-14 VITALS
DIASTOLIC BLOOD PRESSURE: 87 MMHG | OXYGEN SATURATION: 99 % | HEIGHT: 59 IN | BODY MASS INDEX: 26.82 KG/M2 | SYSTOLIC BLOOD PRESSURE: 181 MMHG | WEIGHT: 133.06 LBS | TEMPERATURE: 98 F | RESPIRATION RATE: 16 BRPM | HEART RATE: 82 BPM

## 2019-12-14 LAB
ALBUMIN SERPL BCP-MCNC: 3.4 G/DL (ref 3.5–5.2)
ALP SERPL-CCNC: 70 U/L (ref 55–135)
ALT SERPL W/O P-5'-P-CCNC: 15 U/L (ref 10–44)
ANION GAP SERPL CALC-SCNC: 8 MMOL/L (ref 8–16)
ANION GAP SERPL CALC-SCNC: 8 MMOL/L (ref 8–16)
AORTIC ROOT ANNULUS: 2.52 CM
AORTIC VALVE CUSP SEPERATION: 1.42 CM
AST SERPL-CCNC: 17 U/L (ref 10–40)
AV INDEX (PROSTH): 0.83
AV MEAN GRADIENT: 3 MMHG
AV PEAK GRADIENT: 5 MMHG
AV VALVE AREA: 2.7 CM2
AV VELOCITY RATIO: 80.75
BASOPHILS # BLD AUTO: 0.05 K/UL (ref 0–0.2)
BASOPHILS # BLD AUTO: 0.05 K/UL (ref 0–0.2)
BASOPHILS NFR BLD: 0.6 % (ref 0–1.9)
BASOPHILS NFR BLD: 0.6 % (ref 0–1.9)
BILIRUB SERPL-MCNC: 1 MG/DL (ref 0.1–1)
BSA FOR ECHO PROCEDURE: 1.59 M2
BUN SERPL-MCNC: 26 MG/DL (ref 8–23)
BUN SERPL-MCNC: 26 MG/DL (ref 8–23)
CALCIUM SERPL-MCNC: 9.2 MG/DL (ref 8.7–10.5)
CALCIUM SERPL-MCNC: 9.2 MG/DL (ref 8.7–10.5)
CHLORIDE SERPL-SCNC: 100 MMOL/L (ref 95–110)
CHLORIDE SERPL-SCNC: 100 MMOL/L (ref 95–110)
CO2 SERPL-SCNC: 25 MMOL/L (ref 23–29)
CO2 SERPL-SCNC: 25 MMOL/L (ref 23–29)
CREAT SERPL-MCNC: 1.1 MG/DL (ref 0.5–1.4)
CREAT SERPL-MCNC: 1.1 MG/DL (ref 0.5–1.4)
CV ECHO LV RWT: 0.55 CM
DIFFERENTIAL METHOD: ABNORMAL
DIFFERENTIAL METHOD: ABNORMAL
DOP CALC AO PEAK VEL: 1.12 M/S
DOP CALC AO VTI: 23.3 CM
DOP CALC LVOT AREA: 3.3 CM2
DOP CALC LVOT DIAMETER: 2.04 CM
DOP CALC LVOT PEAK VEL: 90.44 M/S
DOP CALC LVOT STROKE VOLUME: 62.89 CM3
DOP CALCLVOT PEAK VEL VTI: 19.25 CM
E WAVE DECELERATION TIME: 352.23 MSEC
E/A RATIO: 0.72
E/E' RATIO: 15.33 M/S
ECHO LV POSTERIOR WALL: 1.1 CM (ref 0.6–1.1)
EOSINOPHIL # BLD AUTO: 0.2 K/UL (ref 0–0.5)
EOSINOPHIL # BLD AUTO: 0.2 K/UL (ref 0–0.5)
EOSINOPHIL NFR BLD: 2 % (ref 0–8)
EOSINOPHIL NFR BLD: 2 % (ref 0–8)
ERYTHROCYTE [DISTWIDTH] IN BLOOD BY AUTOMATED COUNT: 13.7 % (ref 11.5–14.5)
ERYTHROCYTE [DISTWIDTH] IN BLOOD BY AUTOMATED COUNT: 13.7 % (ref 11.5–14.5)
EST. GFR  (AFRICAN AMERICAN): >60 ML/MIN/1.73 M^2
EST. GFR  (AFRICAN AMERICAN): >60 ML/MIN/1.73 M^2
EST. GFR  (NON AFRICAN AMERICAN): 54.3 ML/MIN/1.73 M^2
EST. GFR  (NON AFRICAN AMERICAN): 54.3 ML/MIN/1.73 M^2
FRACTIONAL SHORTENING: 27 % (ref 28–44)
GLUCOSE SERPL-MCNC: 281 MG/DL (ref 70–110)
GLUCOSE SERPL-MCNC: 306 MG/DL (ref 70–110)
GLUCOSE SERPL-MCNC: 306 MG/DL (ref 70–110)
GLUCOSE SERPL-MCNC: 328 MG/DL (ref 70–110)
GLUCOSE SERPL-MCNC: 374 MG/DL (ref 70–110)
HCT VFR BLD AUTO: 32.9 % (ref 37–48.5)
HCT VFR BLD AUTO: 32.9 % (ref 37–48.5)
HGB BLD-MCNC: 11.5 G/DL (ref 12–16)
HGB BLD-MCNC: 11.5 G/DL (ref 12–16)
IMM GRANULOCYTES # BLD AUTO: 0.04 K/UL (ref 0–0.04)
IMM GRANULOCYTES # BLD AUTO: 0.04 K/UL (ref 0–0.04)
IMM GRANULOCYTES NFR BLD AUTO: 0.5 % (ref 0–0.5)
IMM GRANULOCYTES NFR BLD AUTO: 0.5 % (ref 0–0.5)
INTERVENTRICULAR SEPTUM: 1.1 CM (ref 0.6–1.1)
LEFT ATRIUM SIZE: 2.99 CM
LEFT INTERNAL DIMENSION IN SYSTOLE: 2.92 CM (ref 2.1–4)
LEFT VENTRICLE MASS INDEX: 94 G/M2
LEFT VENTRICULAR INTERNAL DIMENSION IN DIASTOLE: 4.02 CM (ref 3.5–6)
LEFT VENTRICULAR MASS: 146.76 G
LV LATERAL E/E' RATIO: 13.8 M/S
LV SEPTAL E/E' RATIO: 17.25 M/S
LYMPHOCYTES # BLD AUTO: 2.5 K/UL (ref 1–4.8)
LYMPHOCYTES # BLD AUTO: 2.5 K/UL (ref 1–4.8)
LYMPHOCYTES NFR BLD: 28.8 % (ref 18–48)
LYMPHOCYTES NFR BLD: 28.8 % (ref 18–48)
MAGNESIUM SERPL-MCNC: 1.7 MG/DL (ref 1.6–2.6)
MCH RBC QN AUTO: 26.6 PG (ref 27–31)
MCH RBC QN AUTO: 26.6 PG (ref 27–31)
MCHC RBC AUTO-ENTMCNC: 35 G/DL (ref 32–36)
MCHC RBC AUTO-ENTMCNC: 35 G/DL (ref 32–36)
MCV RBC AUTO: 76 FL (ref 82–98)
MCV RBC AUTO: 76 FL (ref 82–98)
MONOCYTES # BLD AUTO: 0.9 K/UL (ref 0.3–1)
MONOCYTES # BLD AUTO: 0.9 K/UL (ref 0.3–1)
MONOCYTES NFR BLD: 10.2 % (ref 4–15)
MONOCYTES NFR BLD: 10.2 % (ref 4–15)
MV PEAK A VEL: 0.96 M/S
MV PEAK E VEL: 0.69 M/S
NEUTROPHILS # BLD AUTO: 4.9 K/UL (ref 1.8–7.7)
NEUTROPHILS # BLD AUTO: 4.9 K/UL (ref 1.8–7.7)
NEUTROPHILS NFR BLD: 57.9 % (ref 38–73)
NEUTROPHILS NFR BLD: 57.9 % (ref 38–73)
NRBC BLD-RTO: 0 /100 WBC
NRBC BLD-RTO: 0 /100 WBC
PLATELET # BLD AUTO: 354 K/UL (ref 150–350)
PLATELET # BLD AUTO: 354 K/UL (ref 150–350)
PMV BLD AUTO: 10.3 FL (ref 9.2–12.9)
PMV BLD AUTO: 10.3 FL (ref 9.2–12.9)
POTASSIUM SERPL-SCNC: 3.8 MMOL/L (ref 3.5–5.1)
POTASSIUM SERPL-SCNC: 3.8 MMOL/L (ref 3.5–5.1)
PROT SERPL-MCNC: 7.1 G/DL (ref 6–8.4)
PV PEAK VELOCITY: 103.89 CM/S
RA PRESSURE: 3 MMHG
RBC # BLD AUTO: 4.33 M/UL (ref 4–5.4)
RBC # BLD AUTO: 4.33 M/UL (ref 4–5.4)
SODIUM SERPL-SCNC: 133 MMOL/L (ref 136–145)
SODIUM SERPL-SCNC: 133 MMOL/L (ref 136–145)
TDI LATERAL: 0.05 M/S
TDI SEPTAL: 0.04 M/S
TDI: 0.05 M/S
WBC # BLD AUTO: 8.5 K/UL (ref 3.9–12.7)
WBC # BLD AUTO: 8.5 K/UL (ref 3.9–12.7)

## 2019-12-14 PROCEDURE — 97116 GAIT TRAINING THERAPY: CPT

## 2019-12-14 PROCEDURE — 92507 TX SP LANG VOICE COMM INDIV: CPT

## 2019-12-14 PROCEDURE — 80053 COMPREHEN METABOLIC PANEL: CPT

## 2019-12-14 PROCEDURE — 82962 GLUCOSE BLOOD TEST: CPT

## 2019-12-14 PROCEDURE — 25000003 PHARM REV CODE 250: Performed by: NURSE PRACTITIONER

## 2019-12-14 PROCEDURE — 63600175 PHARM REV CODE 636 W HCPCS: Performed by: NURSE PRACTITIONER

## 2019-12-14 PROCEDURE — 85025 COMPLETE CBC W/AUTO DIFF WBC: CPT

## 2019-12-14 PROCEDURE — C9113 INJ PANTOPRAZOLE SODIUM, VIA: HCPCS | Performed by: NURSE PRACTITIONER

## 2019-12-14 PROCEDURE — 36415 COLL VENOUS BLD VENIPUNCTURE: CPT

## 2019-12-14 PROCEDURE — 97530 THERAPEUTIC ACTIVITIES: CPT

## 2019-12-14 PROCEDURE — 83735 ASSAY OF MAGNESIUM: CPT

## 2019-12-14 RX ORDER — NAPROXEN SODIUM 220 MG/1
81 TABLET, FILM COATED ORAL DAILY
Qty: 360 TABLET | Refills: 0 | Status: SHIPPED | OUTPATIENT
Start: 2019-12-15 | End: 2021-02-11 | Stop reason: SDUPTHER

## 2019-12-14 RX ORDER — CLOPIDOGREL BISULFATE 75 MG/1
75 TABLET ORAL DAILY
Qty: 30 TABLET | Refills: 11 | Status: SHIPPED | OUTPATIENT
Start: 2019-12-15 | End: 2021-02-11 | Stop reason: SDUPTHER

## 2019-12-14 RX ORDER — ATORVASTATIN CALCIUM 40 MG/1
40 TABLET, FILM COATED ORAL DAILY
Qty: 90 TABLET | Refills: 3 | Status: SHIPPED | OUTPATIENT
Start: 2019-12-15 | End: 2021-02-11 | Stop reason: SDUPTHER

## 2019-12-14 RX ORDER — NAPROXEN SODIUM 220 MG/1
81 TABLET, FILM COATED ORAL DAILY
Status: DISCONTINUED | OUTPATIENT
Start: 2019-12-15 | End: 2019-12-14 | Stop reason: HOSPADM

## 2019-12-14 RX ADMIN — INSULIN ASPART 3 UNITS: 100 INJECTION, SOLUTION INTRAVENOUS; SUBCUTANEOUS at 05:12

## 2019-12-14 RX ADMIN — ATORVASTATIN CALCIUM 40 MG: 40 TABLET, FILM COATED ORAL at 08:12

## 2019-12-14 RX ADMIN — INSULIN ASPART 5 UNITS: 100 INJECTION, SOLUTION INTRAVENOUS; SUBCUTANEOUS at 12:12

## 2019-12-14 RX ADMIN — CLOPIDOGREL BISULFATE 75 MG: 75 TABLET, FILM COATED ORAL at 08:12

## 2019-12-14 RX ADMIN — INSULIN ASPART 4 UNITS: 100 INJECTION, SOLUTION INTRAVENOUS; SUBCUTANEOUS at 08:12

## 2019-12-14 RX ADMIN — PANTOPRAZOLE SODIUM 40 MG: 40 INJECTION, POWDER, FOR SOLUTION INTRAVENOUS at 08:12

## 2019-12-14 RX ADMIN — ASPIRIN 325 MG: 325 TABLET, COATED ORAL at 08:12

## 2019-12-14 NOTE — PLAN OF CARE
Problem: Physical Therapy Goal  Goal: Physical Therapy Goal  Description  Goals to be met by: D/C     Patient will increase functional independence with mobility by performin. Supine to sit with Stand-by Assistance(MET)2019   2. Sit to stand transfer with Supervision  3. Bed to chair transfer with Supervision using Rolling Walker  4. Gait  x 250 feet with Supervision using Rolling Walker with no LOB.      Outcome: Ongoing, Progressing

## 2019-12-14 NOTE — CONSULTS
[facility]  Neurology  Consult Note    Patient Name: Steff Johnson  MRN: [id number]  Admission Date: 12/12/2019  Hospital Length of Stay: 0 days  Code Status: Full Code   Attending Provider: Juan Alberto Iqbal MD   Consulting Provider: Mary Alice Tellez DNP  Primary Care Physician: Cristina Ren MD  Principal Problem:CVA (cerebral vascular accident)    Consults  Subjective:     Chief Complaint:  Balance disturbance, woke up with symptoms     HPI: 59 year old  female presents to emergency room with complaints of double vision.       The patient states she has been having double vision and blurred vision for the past 1-2 days.  The patient went to [facility] in [place] and was seen in the emergency room and was diagnosed with a stye to her right eyelid and given of antibiotic ointment and discharged.  The patient states later that day she began to feel dizziness lightheadedness.  The patient states she felt as though she was drunk.  She denies use of any alcohol.  The patient states every time she tried a walk she feel as if she is going to fall over.  She denied headache or head trauma.  She describes her symptoms as moderate to severe severity with no exacerbating or alleviating factors.       Past medical history significant for suboptimally controlled hypertension, non-insulin-dependent type 2 diabetes, hyperlipidemia and arthritis    INTERVAL HISTORY: Patient seen and examined with Dr. Magana.     CRT: 0.9  LDL:  63.2  Hgb A1c: 11    Brain imaging   CTH w/o contrast: negative   CTA head and neck: negative for stenosis/ occlusion   MRI brain:  1. Tiny pontine infarct seen.  2. Mild atrophy and moderate chronic ischemic changes.    Heart imaging   TTE pending         Past Medical History:   Diagnosis Date    Arthritis     COPD (chronic obstructive pulmonary disease)     Diabetes mellitus     Hypertension     Wears glasses        Past Surgical History:   Procedure Laterality Date     COLONOSCOPY N/A 4/11/2017    Procedure: COLONOSCOPY;  Surgeon: Jared Antonio MD;  Location: [facility];  Service: Endoscopy;  Laterality: N/A;    HYSTERECTOMY      OOPHORECTOMY      SHOULDER SURGERY      R       Review of patient's allergies indicates:  No Known Allergies    Current Medications:   Scheduled     Medication Last Action   aspirin EC tablet 325 mg Given   325 mg, Daily, Oral    12/13 1021   atorvastatin tablet 40 mg Given   40 mg, Daily, Oral    12/13 1021   clopidogrel tablet 600 mg Ordered   600 mg, Once, Oral       clopidogrel tablet 75 mg Ordered   75 mg, Daily, Oral       pantoprazole injection 40 mg Ordered   40 mg, Daily, IV           No current facility-administered medications on file prior to encounter.      Current Outpatient Medications on File Prior to Encounter   Medication Sig    amlodipine-valsartan (EXFORGE)  mg per tablet Take 1 tablet by mouth once daily.    atorvastatin (LIPITOR) 20 MG tablet Take 1 tablet (20 mg total) by mouth once daily.    chlorthalidone (HYGROTEN) 25 MG Tab Take 2 tablets (50 mg total) by mouth once daily.    hydrALAZINE (APRESOLINE) 25 MG tablet Take 1 tablet (25 mg total) by mouth 3 (three) times daily.    HYDROcodone-acetaminophen (NORCO)  mg per tablet Take 1 tablet by mouth every 6 (six) hours as needed for Pain.    insulin aspart U-100 (NOVOLOG FLEXPEN U-100 INSULIN) 100 unit/mL (3 mL) InPn pen Novolog 6 u AC + correction Max TDD 35u (Patient taking differently: Inject 6-12 Units into the skin 3 (three) times daily before meals. Novolog 6 u AC + correction Max TDD 35u)    insulin degludec (TRESIBA FLEXTOUCH U-100) 100 unit/mL (3 mL) InPn Inject 30 Units into the skin once daily. (Patient taking differently: Inject 32 Units into the skin every evening. )    metoprolol succinate (TOPROL-XL) 200 MG 24 hr tablet Take 1 tablet (200 mg total) by mouth once daily.    blood sugar diagnostic Strp To check BG 2 times daily, to use with  "insurance preferred meter    cloNIDine 0.3 mg/24 hr td ptwk (CATAPRES) 0.3 mg/24 hr Place 1 patch onto the skin every 7 days.    ergocalciferol (ERGOCALCIFEROL) 50,000 unit Cap Take 1 capsule (50,000 Units total) by mouth every 7 days.    fluticasone (FLONASE) 50 mcg/actuation nasal spray 1 spray (50 mcg total) by Each Nare route once daily. (Patient taking differently: 1 spray by Each Nostril route daily as needed. )    lancets Misc To check BG 2 times daily, to use with insurance preferred meter    pen needle, diabetic (BD MI 2ND GEN PEN NEEDLE) 32 gauge x 5/32" Ndle Uses 4 daily    umeclidinium (INCRUSE ELLIPTA) 62.5 mcg/actuation DsDv Inhale 1 each into the lungs once daily. Controller      Family History     Problem Relation (Age of Onset)    Anemia Daughter    Asthma Mother    Diabetes Mother, Father, Brother    Hypertension Mother, Brother        Tobacco Use    Smoking status: Never Smoker    Smokeless tobacco: Never Used   Substance and Sexual Activity    Alcohol use: No    Drug use: No    Sexual activity: Not Currently     Review of Systems   Constitutional: Positive for fatigue. Negative for activity change, chills and fever.   HENT: Negative for congestion, drooling, ear pain, hearing loss, nosebleeds, rhinorrhea, sinus pressure, sore throat and trouble swallowing.    Eyes: Positive for visual disturbance. Negative for pain.   Respiratory: Negative for apnea, cough, choking, chest tightness, shortness of breath and wheezing.    Cardiovascular: Negative for chest pain, palpitations and leg swelling.   Gastrointestinal: Negative for abdominal distention, abdominal pain, blood in stool, constipation, diarrhea, nausea and vomiting.   Endocrine: Negative for polydipsia, polyphagia and polyuria.   Genitourinary: Negative for decreased urine volume, dysuria, flank pain, frequency and hematuria.   Musculoskeletal:  Negative for back pain, joint swelling, myalgias, neck pain and neck stiffness. "   Skin: Negative for rash and wound.   Neurological: Positive for dizziness, weakness, light-headedness and numbness. Negative for tremors, seizures, syncope, facial asymmetry, speech difficulty and headaches.   Psychiatric/Behavioral: Negative for behavioral problems, confusion, hallucinations and sleep disturbance. The patient is not nervous/anxious.      Objective:     Vital Signs (Most Recent):  Temp: 97.7 °F (36.5 °C) (12/13/19 1550)  Pulse: 75 (12/13/19 1550)  Resp: 18 (12/13/19 1550)  BP: (!) 174/83 (12/13/19 1550)  SpO2: 95 % (12/13/19 1550) Vital Signs (24h Range):  Temp:  [97.5 °F (36.4 °C)-98.6 °F (37 °C)] 97.7 °F (36.5 °C)  Pulse:  [64-84] 75  Resp:  [12-19] 18  SpO2:  [95 %-99 %] 95 %  BP: (134-215)/(82-99) 174/83     Weight: 60.8 kg (134 lb 0.6 oz)  Body mass index is 27.07 kg/m².    Physical Exam   Constitutional: She is oriented to person, place, and time.   Eyes: Pupils are equal, round, and reactive to light.   Neurological: She is oriented to person, place, and time. She has normal strength. She has a normal Finger-Nose-Finger Test.   Psychiatric: Her speech is normal.     Constitutional: She is oriented to person, place, and time. She appears well-developed and well-nourished.   HENT:   Head: Atraumatic.   Eyes: Pupils are equal, round, and reactive to light. EOM are normal.   Neck: Normal range of motion. Neck supple.   Cardiovascular: Normal rate, regular rhythm  Pulmonary/Chest: Effort normal and breath sounds normal.   Abdominal: Soft. Bowel sounds are normal.   Musculoskeletal: Normal range of motion.   Skin: Skin is warm and dry. Capillary refill takes less than 2 seconds.   Psychiatric: She has a normal mood and affect.            NEUROLOGICAL EXAMINATION:     MENTAL STATUS   Oriented to person, place, and time.   Speech: speech is normal   Level of consciousness: alert  Knowledge: good.   Able to name object. Able to repeat.     CRANIAL NERVES     CN III, IV, VI   Pupils are equal, round,  and reactive to light.  Diplopia: right and horizontal    CN V   Facial sensation intact.     CN VII   Facial expression full, symmetric.        Unable to adduct Left eye      MOTOR EXAM     Strength   Strength 5/5 throughout.     SENSORY EXAM   Light touch normal.     GAIT AND COORDINATION      Coordination   Finger to nose coordination: normal      Significant Labs:   Hemoglobin A1c:   Recent Labs   Lab 12/13/19  0356   HGBA1C 11.0*     CBC:   Recent Labs   Lab 12/12/19  1639 12/13/19  0356   WBC 9.29 8.48   HGB 12.9 12.6   HCT 37.1 36.0*   * 370*     CMP:   Recent Labs   Lab 12/12/19  1639 12/13/19  0356   * 192*   * 137   K 4.1 3.6   CL 98 104   CO2 29 26   BUN 24* 22   CREATININE 1.3 0.9   CALCIUM 9.3 8.9   MG  --  1.7   PROT 7.5 7.2   ALBUMIN 3.9 3.5   BILITOT 0.9 1.0   ALKPHOS 83 77   AST 17 17   ALT 15 15   ANIONGAP 8 7*   EGFRNONAA 44.4* >60.0     All pertinent lab results from the past 24 hours have been reviewed.    Significant Imaging: I have reviewed all pertinent imaging results/findings within the past 24 hours.    Assessment and Plan:  1. Acute ischemic pontine infarction   -Administer loading dose of plavix 600 mg po x 1 dose. Then give plavix 75 mg po daily starting tomorrow.   -DAPT: asa 81 mg po once and plavix 75 mg po once daily for 21 days. Then, stop taking plavix and continue aspirin 81 mg po once daily indefinitely.     2. Cranial nerve III palsy   -related to above     3. Type 2 DM   -Hospital medicine managing     4. Hypertension   -homed medication continued     5. HLD  -LDL at goal   -continue statin      Active Diagnoses:    Diagnosis Date Noted POA    PRINCIPAL PROBLEM:  CVA (cerebral vascular accident) [I63.9] 12/12/2019 Yes    Cranial nerve III palsy, left [H49.02] 12/12/2019 Yes    Mixed hyperlipidemia [E78.2] 07/19/2018 Yes    Type 2 diabetes mellitus with hyperglycemia, with long-term current use of insulin [E11.65, Z79.4] 06/05/2015 Not Applicable     Hypertension associated with diabetes [E11.59, I10] 03/31/2012 Yes      Problems Resolved During this Admission:       VTE Risk Mitigation (From admission, onward)         Ordered     IP VTE LOW RISK PATIENT  Once      12/12/19 2123     Place sequential compression device  Until discontinued      12/12/19 2123                Thank you for your consult. I will follow-up with patient. Please contact us if you have any additional questions.    Mary Alice Tellez, BRY  Neurology  [facility]

## 2019-12-14 NOTE — PROGRESS NOTES
[facility]  Neurology  Progress Note    Patient Name: Steff Johnson  MRN: [id number]  Admission Date: 12/12/2019  Hospital Length of Stay: 1 days  Code Status: Full Code   Attending Provider: Juan Alberto Iqbal MD  Primary Care Physician: Cristina Ren MD   Principal Problem:CVA (cerebral vascular accident)    Subjective:     Interval History: Patient seen and examined in room. She continues to c/o blurry vision with right lateral gaze. No other neurological deficits noted. No acute events overnight.     TTE: pending   Hgb A1c: 11.0    Current Medications:     Current Facility-Administered Medications   Medication Dose Route Frequency Provider Last Rate Last Dose    aspirin EC tablet 325 mg  325 mg Oral Daily Ashlee Damon NP   325 mg at 12/14/19 0801    atorvastatin tablet 40 mg  40 mg Oral Daily Ashlee Damon NP   40 mg at 12/14/19 0801    bisacodyl suppository 10 mg  10 mg Rectal Daily PRN Ashlee Damon NP        clopidogrel tablet 75 mg  75 mg Oral Daily Mary Alice Geoff, DNP   75 mg at 12/14/19 0807    dextrose 50% injection 12.5 g  12.5 g Intravenous PRN Ashlee Damon NP        glucagon (human recombinant) injection 1 mg  1 mg Intramuscular PRN Ashlee Damon NP        hydrALAZINE injection 10 mg  10 mg Intravenous Q4H PRN Gato Griffin MD        insulin aspart U-100 pen 0-5 Units  0-5 Units Subcutaneous Q6H PRN Ashlee Damon NP   4 Units at 12/14/19 0801    labetalol injection 10 mg  10 mg Intravenous Q4H PRN Gato Griffin MD        ondansetron injection 4 mg  4 mg Intravenous Q8H PRN Ashlee Damon NP        pantoprazole injection 40 mg  40 mg Intravenous Daily Ashlee Damon NP   40 mg at 12/14/19 0801    sodium chloride 0.9% flush 10 mL  10 mL Intravenous PRN Ashlee Damon NP           Review of Systems   As per HPI  Objective:     Vital Signs (Most Recent):  Temp: 98.2 °F (36.8 °C) (12/14/19 0725)  Pulse: 82 (12/14/19 0725)  Resp: 16 (12/14/19 0725)  BP: (!) 176/88  (12/14/19 0725)  SpO2: (!) 90 % (12/14/19 0725) Vital Signs (24h Range):  Temp:  [97.7 °F (36.5 °C)-98.6 °F (37 °C)] 98.2 °F (36.8 °C)  Pulse:  [68-82] 82  Resp:  [16-18] 16  SpO2:  [90 %-99 %] 90 %  BP: (134-176)/(82-96) 176/88     Weight: 60.3 kg (133 lb 0.8 oz)  Body mass index is 26.87 kg/m².    Physical Exam  Constitutional: She is oriented to person, place, and time.   Eyes: Pupils are equal, round, and reactive to light.   Neurological: She is oriented to person, place, and time. She has normal strength. She has a normal Finger-Nose-Finger Test.   Psychiatric: Her speech is normal.      Constitutional: She is oriented to person, place, and time. She appears well-developed and well-nourished.   HENT:   Head: Atraumatic.   Eyes: Pupils are equal, round, and reactive to light. EOM are normal.   Neck: Normal range of motion. Neck supple.   Cardiovascular: Normal rate, regular rhythm  Pulmonary/Chest: Effort normal and breath sounds normal.   Abdominal: Soft. Bowel sounds are normal.   Musculoskeletal: Normal range of motion.   Skin: Skin is warm and dry. Capillary refill takes less than 2 seconds.   Psychiatric: She has a normal mood and affect.            NEUROLOGICAL EXAMINATION:      MENTAL STATUS   Oriented to person, place, and time.   Speech: speech is normal   Level of consciousness: alert  Knowledge: good.   Able to name object. Able to repeat.      CRANIAL NERVES      CN III, IV, VI   Pupils are equal, round, and reactive to light.  Diplopia: right and horizontal     CN V   Facial sensation intact.      CN VII   Facial expression full, symmetric.        Unable to adduct Left eye     Diplopia on right lateral gaze     MOTOR EXAM      Strength   Strength 5/5 throughout.      SENSORY EXAM   Light touch normal.      GAIT AND COORDINATION      Coordination   Finger to nose coordination: normal        Significant Labs:   Hemoglobin A1c:   Recent Labs   Lab 12/13/19  0356   HGBA1C 11.0*     CBC:   Recent Labs    Lab 12/12/19  1639 12/13/19  0356 12/14/19  0327   WBC 9.29 8.48 8.50  8.50   HGB 12.9 12.6 11.5*  11.5*   HCT 37.1 36.0* 32.9*  32.9*   * 370* 354*  354*     CMP:   Recent Labs   Lab 12/12/19  1639 12/13/19  0356 12/14/19  0327   * 192* 306*  306*   * 137 133*  133*   K 4.1 3.6 3.8  3.8   CL 98 104 100  100   CO2 29 26 25  25   BUN 24* 22 26*  26*   CREATININE 1.3 0.9 1.1  1.1   CALCIUM 9.3 8.9 9.2  9.2   MG  --  1.7 1.7   PROT 7.5 7.2 7.1   ALBUMIN 3.9 3.5 3.4*   BILITOT 0.9 1.0 1.0   ALKPHOS 83 77 70   AST 17 17 17   ALT 15 15 15   ANIONGAP 8 7* 8  8   EGFRNONAA 44.4* >60.0 54.3*  54.3*     All pertinent lab results from the past 24 hours have been reviewed.    Significant Imaging: I have reviewed all pertinent imaging results/findings within the past 24 hours.    Assessment and Plan:  1. Acute ischemic infarction   -Left pontine lacunar infarct (basilar artery territory)  -Etiology: workup ongoing, but likely due to uncontrolled small vessel disease (A1c 11)  - TTE with bubble study: pending   -dual antiplatelet therapy with aspirin and plavix for 21 days, then continue monotherapy with aspirin alone  -needs close PCP follow-up for control of vascular risk factors, particularly uncontrolled DM  -PT/OT for visual deficits    2. Cranial nerve 3 palsy   -related to above     3. Type 2 diabetes mellitus   -Hgb A1c 11   -Strongly advised patient to f/u PCP to obtain better control of BG.   -Hospital medicine managing     4. Hypertension   -Home medication continued     5. HLD   -LDL at goal   -Continue statin     Patient to follow up with [facility] at [phone] within 2 weeks from discharge.  Stroke education was provided including stroke risk factors modification and any acute neurological changes including weakness, confusion, visual changes to come straight to the ER.  All side effects of new medications were discussed with patient and/or next of kin and all  questions were answered.        Active Diagnoses:    Diagnosis Date Noted POA    PRINCIPAL PROBLEM:  CVA (cerebral vascular accident) [I63.9] 12/12/2019 Yes    Cranial nerve III palsy, left [H49.02] 12/12/2019 Yes    Mixed hyperlipidemia [E78.2] 07/19/2018 Yes    Type 2 diabetes mellitus with hyperglycemia, with long-term current use of insulin [E11.65, Z79.4] 06/05/2015 Not Applicable    Hypertension associated with diabetes [E11.59, I10] 03/31/2012 Yes      Problems Resolved During this Admission:       VTE Risk Mitigation (From admission, onward)         Ordered     IP VTE LOW RISK PATIENT  Once      12/12/19 2123     Place sequential compression device  Until discontinued      12/12/19 2123                Mary Alice Tellez DNP  Neurology  [facility]    I, Dr. Mushtaq Kessler, have personally seen and examined the patient with my advanced provider and agree with above. I personally did a focused exam, and reviewed all necessary clinical information. I discussed my management plan with my NP and agree with above. Stable. Ok to DC.

## 2019-12-14 NOTE — PT/OT/SLP PROGRESS
Physical Therapy Treatment    Patient Name:  Steff Johnson   MRN:  [id number]    Recommendations:     Discharge Recommendations:  home health PT   Discharge Equipment Recommendations: walker, rolling   Barriers to discharge:      Assessment:     Steff Johnson is a 59 y.o. female admitted with a medical diagnosis of CVA (cerebral vascular accident).  She presents with the following impairments/functional limitations:  impaired balance, gait instability, impaired functional mobilty, impaired self care skills, visual deficits. Pt demonstrated ability to gretchen shoes independently. Pt completed gait trainign without noting LOB or SOB. Pt ambulates with R sided lean. Pt reports being limited during gait training 2/2 visual deficits.  Continue with PT and POC.     Rehab Prognosis: Good; patient would benefit from acute skilled PT services to address these deficits and reach maximum level of function.    Recent Surgery: * No surgery found *      Plan:     During this hospitalization, patient to be seen 6 x/week to address the identified rehab impairments via gait training, therapeutic activities, therapeutic exercises and progress toward the following goals:    · Plan of Care Expires:  01/13/20    Subjective     Chief Complaint: Pt with c/o double vision  Patient/Family Comments/goals: return home   Pain/Comfort:  · Pain Rating 1: 0/10  · Pain Rating Post-Intervention 1: 0/10      Objective:     Communicated with RN prior to session.  Patient found HOB elevated with telemetry( present ) upon PT entry to room.     General Precautions: Standard, fall   Orthopedic Precautions:N/A   Braces:       Functional Mobility:  · Bed Mobility:     · Supine to Sit: supervision  · Transfers:     · Sit to Stand:  stand by assistance with rolling walker  · Gait: 200 feet with RW and CGA       AM-PAC 6 CLICK MOBILITY          Therapeutic Activities and Exercises:   bed mobility; sitting EOB for trunk control and midline orientation;  sit<> stands; transfer training; gait training     Patient left sitting EOB with all lines intact, call button in reach and  present..    GOALS:   Multidisciplinary Problems     Physical Therapy Goals        Problem: Physical Therapy Goal    Goal Priority Disciplines Outcome Goal Variances Interventions   Physical Therapy Goal     PT, PT/OT Ongoing, Progressing     Description:  Goals to be met by: D/C     Patient will increase functional independence with mobility by performin. Supine to sit with Stand-by Assistance  2. Sit to stand transfer with Supervision  3. Bed to chair transfer with Supervision using Rolling Walker  4. Gait  x 250 feet with Supervision using Rolling Walker with no LOB.                       Time Tracking:     PT Received On: 19  PT Start Time: 951     PT Stop Time: 1014  PT Total Time (min): 23 min     Billable Minutes: Gait Training 13 and Therapeutic Activity 10    Treatment Type: Treatment  PT/PTA: PTA     PTA Visit Number: 1     Hillary Hammant, CRISTINO  2019

## 2019-12-14 NOTE — PT/OT/SLP PROGRESS
Speech Language Pathology Treatment    Patient Name:  Steff Johnson   MRN:  [id number]  Admitting Diagnosis: CVA (cerebral vascular accident)    Recommendations:                 General Recommendations:  Speech/language therapy  Diet recommendations:  Regular(as tolerated, following swallow precautions), Liquid Diet Level: Thin(single sips)   Aspiration Precautions: Standard   General Precautions: Standard, aspiration(vision impairment)  Communication strategies:  none    Subjective     Patient alert, sat on EOB for session  Patient goals: for vision to improve     Pain/Comfort:  · Pain Rating 1: 0/10    Objective:     Has the patient been evaluated by SLP for swallowing?   Yes  Keep patient NPO? No   Current Respiratory Status: room air      Ongoing visual perceptual, language assessment    Assessment:     Steff Johnson is a 59 y.o. 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  Patient completed clock drawing with MOD I, self correcting her numeral placement.  She completed serial writing, writing her name and address and sentence level written expression to dictation with MOD I.  Patient reported continuing to see double, reporting one eye tracks but the other one does not.  Patient reports reading to grandchildren as a usual part of her routine.  Recommend assessment of functional reading abilities d/t given impairment.  Goals:   Multidisciplinary Problems     SLP Goals        Problem: SLP Goal    Goal Priority Disciplines Outcome   SLP Goal     SLP Ongoing, Progressing   Description:  1. Pt will participate in further assessment of verbal problem solving and executive function.  2. Pt will demonstrate understanding of training provided for improving functional memory for new information.  3. Pt will demonstrate understanding and use/recall of swallow safety precautions (90 degree position, small sips/bites, eat slowly, signs to report to doctor).                     Plan:     · Patient to be seen:  4 x/week(visit tomorrow, then 4 visits next week)   · Plan of Care expires:  12/27/19  · Plan of Care reviewed with:      · SLP Follow-Up:  Yes       Discharge recommendations:  (cognitive-linguistic tx, dysphagia tx, home health vs outpt tx)   Barriers to Discharge:  None    Time Tracking:     SLP Treatment Date:   12/14/19  Speech Start Time:  0901  Speech Stop Time:  0917     Speech Total Time (min):  16 min    Billable Minutes: Speech Therapy Individual 16    Camila Drummond CCC-SLP  12/14/2019

## 2019-12-14 NOTE — PLAN OF CARE
Plan of care, medications, and safety reviewed with patient. Pt is resting, free of injury during shift. Neuro checks q4 hours continued. Pt received loading dose of plavix and will start daily dose today. Personal items and call light within reach. Will continue to monitor.

## 2019-12-14 NOTE — PROGRESS NOTES
"[facility] Medicine Progress Note  Patient Name: Steff Johnson MRN: [id number]   Patient Class: IP- Inpatient  Length of Stay: 0   Admission Date: 12/12/2019  5:36 PM Attending Physician: Juan Alberto Iqbal MD   Primary Care Provider: Cristina Ren MD Face-to-Face encounter date: 12/13/2019   Chief Complaint: OFF BALANCE (WOKE UP THIS AM LIKE THIS) and Eye Problem      Assessment & Plan:   Steff Johnson is a 59 y.o. female admitted for    1. Acute CVA: Stable. MRI confirmed infarct. CTA neck negative. Echo pending. Asa + plavix.    2. DM II: Sliding scale    3. Hyperlipidemia: Statin      Discharge Planning:   PT C/S for post stroke care    Subjective:    Interval History: Patient is doing well. Reports diplopia but otherwise no weakness in upper and lower extremities. Family present at bedside.No concerns/issues overnight reported by the patient or the nursing staff.    Review of Systems All other Review of Systems were found to be negative expect for that mentioned already in HPI.   Objective:   Physical Exam  BP (!) 174/83 (BP Location: Left arm, Patient Position: Sitting)   Pulse 75   Temp 97.7 °F (36.5 °C) (Oral)   Resp 18   Ht 4' 11" (1.499 m)   Wt 60.8 kg (134 lb 0.6 oz)   SpO2 95%   Breastfeeding? No   BMI 27.07 kg/m²   Vitals reviewed.    Constitutional: No distress.   HENT: Atraumatic.   Cardiovascular: Normal rate, regular rhythm and normal heart sounds.   Pulmonary/Chest: Effort normal. Clear to auscultation bilaterally. No wheezes.   Abdominal: Soft. Bowel sounds are normal. Exhibits no distension and no mass. No tenderness  Neurological: Alert.   Skin: Skin is warm and dry.     Following labs were Reviewed   Recent Labs   Lab 12/13/19  0356   WBC 8.48   HGB 12.6   HCT 36.0*   *   CALCIUM 8.9   ALBUMIN 3.5   PROT 7.2      K 3.6   CO2 26      BUN 22   CREATININE 0.9   ALKPHOS 77   ALT 15   AST 17   BILITOT 1.0     No results found for: POCTGLUCOSE "     Microbiology Results (last 7 days)     ** No results found for the last 168 hours. **        MRI Brain Without Contrast   Final Result      CTA Head and Neck (xpd)   Final Result      1.  No clinically significant stenosis, large vessel occlusion, or aneurysm is identified as outlined above.      2.  Tortuous internal carotid arteries in the neck.         Electronically signed by: Serg Brambila MD   Date:    12/12/2019   Time:    19:41      X-Ray Chest PA And Lateral   Final Result      No acute cardiac or pulmonary process.         Electronically signed by: Serg Brambila MD   Date:    12/12/2019   Time:    17:32      CT Head Without Contrast   Final Result      1. No acute intracranial process.      2. Chronic/involutional findings as noted above.         Electronically signed by: Serg Brambila MD   Date:    12/12/2019   Time:    16:55          Inpatient medications  Scheduled Meds:   aspirin  325 mg Oral Daily    atorvastatin  40 mg Oral Daily    clopidogrel  600 mg Oral Once    [START ON 12/14/2019] clopidogrel  75 mg Oral Daily    pantoprazole  40 mg Intravenous Daily     Continuous Infusions:  PRN Meds:.bisacodyl, dextrose 50%, glucagon (human recombinant), hydrALAZINE, insulin aspart U-100, labetalol, ondansetron, sodium chloride 0.9%    Above encounter included review of the medical records, interviewing and examining the patient face-to-face, discussion with family and other health care providers, ordering and interpreting lab/test results and formulating a plan of care.     Medical Decision Making:    [] Low Complexity  [x] Moderate Complexity  [] High Complexity    Juan Alberto Iqbal  [facility] Hospitalist  12/13/2019

## 2019-12-14 NOTE — CONSULTS
"[facility]  Adult Nutrition   Consult Note (Initial Assessment)     SUMMARY     Recommendations  Recommendation/Intervention:   1. RD educated patient on diet.   · RD educated patient on diet for management of stroke related risk factors. Provided written heart healthy nutrition therapy, diabetes lifestyle skills and meal planning, how to read a food label, and salt substitutes. Encouraged smoking cessation. Provided RD contact info. Patient expressed understanding of diet recommendations.     Goals:   1. RD to answer diet related questions should they arise.     Nutrition Goal Status: new    Reason for Assessment    Reason For Assessment: consult  Diagnosis: stroke/CVA  Relevant Medical History: stroke trigger. HTN, HLD, DM2   Nutrition Discharge Planning: diabetic, heart healthy diet     Nutrition Risk Screen    Nutrition Risk Screen: no indicators present    Nutrition/Diet History    Patient Reported Diet/Restrictions/Preferences: general  Spiritual, Cultural Beliefs, Oriental orthodox Practices, Values that Affect Care: no  Food Allergies: NKFA    Anthropometrics    Temp: 97.7 °F (36.5 °C)  Height Method: Stated  Height: 4' 11" (149.9 cm)  Height (inches): 59 in  Weight Method: Standard Scale  Weight: 60.8 kg (134 lb 0.6 oz)  Weight (lb): 134.04 lb  Ideal Body Weight (IBW), Female: 95 lb  % Ideal Body Weight, Female (lb): 141.09 lb  BMI (Calculated): 27.1  BMI Grade: 25 - 29.9 - overweight       Weight History:  Wt Readings from Last 10 Encounters:   12/12/19 60.8 kg (134 lb 0.6 oz)   12/12/19 62.6 kg (138 lb 0.1 oz)   11/20/19 62.6 kg (138 lb 0.1 oz)   11/06/19 61.5 kg (135 lb 11.1 oz)   10/24/19 61.9 kg (136 lb 7.4 oz)   10/17/19 61.8 kg (136 lb 3.9 oz)   10/05/19 60.3 kg (133 lb)   09/30/19 60.5 kg (133 lb 6.1 oz)   08/27/19 60.5 kg (133 lb 6.1 oz)   08/07/19 60.5 kg (133 lb 6.1 oz)       Lab/Procedures/Meds: Pertinent Labs Reviewed  Clinical Chemistry:  Recent Labs   Lab 12/13/19  0356      K " 3.6      CO2 26   *   BUN 22   CREATININE 0.9   CALCIUM 8.9   PROT 7.2   ALBUMIN 3.5   BILITOT 1.0   ALKPHOS 77   AST 17   ALT 15   ANIONGAP 7*   ESTGFRAFRICA >60.0   EGFRNONAA >60.0   MG 1.7   PHOS 3.4     CBC:   Recent Labs   Lab 12/13/19  0356   WBC 8.48   RBC 4.69   HGB 12.6   HCT 36.0*   *   MCV 77*   MCH 26.9*   MCHC 35.0     Lipid Panel:  Recent Labs   Lab 12/12/19  1639   CHOL 154   HDL 66   LDLCALC 63.2   TRIG 124   CHOLHDL 42.9     Cardiac Profile:  Recent Labs   Lab 12/13/19  0356   CPKMB 2.2   TROPONINI 0.042*     Diabetes:  Recent Labs   Lab 12/13/19  0356   HGBA1C 11.0*     Thyroid & Parathyroid:  Recent Labs   Lab 12/12/19  1639   TSH 1.240     Medications: Pertinent Medications reviewed  Scheduled Meds:   aspirin  325 mg Oral Daily    atorvastatin  40 mg Oral Daily    clopidogrel  600 mg Oral Once    [START ON 12/14/2019] clopidogrel  75 mg Oral Daily    pantoprazole  40 mg Intravenous Daily     Continuous Infusions:  PRN Meds:.bisacodyl, dextrose 50%, glucagon (human recombinant), hydrALAZINE, insulin aspart U-100, labetalol, ondansetron, sodium chloride 0.9%    Estimated/Assessed Needs    Weight Used For Calorie Calculations: 60.8 kg (134 lb 0.6 oz)  Energy Calorie Requirements (kcal): 1520 - 1824 (25 - 30)   Energy Need Method: Kcal/kg  Protein Requirements: 73 (1.2 g/kg)   Weight Used For Protein Calculations: 60.8 kg (134 lb 0.6 oz)     Estimated Fluid Requirement Method: RDA Method  RDA Method (mL): 1520       Nutrition Prescription Ordered    Current Diet Order: diabetic/ cardiac     Evaluation of Received Nutrient/Fluid Intake    Energy Calories Required: meeting needs  Protein Required: meeting needs  Fluid Required: meeting needs  Tolerance: tolerating   No intake or output data in the 24 hours ending 12/13/19 1916   % Intake of Estimated Energy Needs: 75 - 100 %  % Meal Intake: 75 - 100 %    Dietitian Rounds Brief    RD consult trigger due to stroke work up. Per  progress notes: No clinically significant stenosis, large vessel occlusion, or aneurysm is identified as outlined above. 2. Tortuous internal carotid arteries in the neck.   RD provided diet education. Patient checks BG and sees outpatient dietitian at another facility per pt. Pt accepted diet education. Recommended continued outpatient nutrition therapy for blood glucose control.     Nutrition Risk    Level of Risk/Frequency of Follow-up: moderate     Monitor and Evaluation    Food and Nutrient Intake: energy intake, food and beverage intake  Food and Nutrient Adminstration: diet order  Knowledge/Beliefs/Attitudes: food and nutrition knowledge/skill, beliefs and attitudes  Physical Activity and Function: nutrition-related ADLs and IADLs  Anthropometric Measurements: weight, weight change  Biochemical Data, Medical Tests and Procedures: electrolyte and renal panel, inflammatory profile, gastrointestinal profile, lipid profile, glucose/endocrine profile  Nutrition-Focused Physical Findings: overall appearance     Nutrition Follow-Up    RD Follow-up?: Yes    Renetta Parekh RD 12/13/2019 7:17 PM

## 2019-12-14 NOTE — NURSING
Pt family noted to be at bedside and turned bed alarm off. Educated on safety multiple times, family noted to cont to turn off bed alarm per extender.

## 2019-12-14 NOTE — DISCHARGE SUMMARY
[facility]  Discharge Summary  Patient Name: Steff Johnson MRN: [id number]   Patient Class: IP- Inpatient  Length of Stay: 1   Admission Date: 12/12/2019  5:36 PM Attending Physician: Juan Alberto Iqbal MD   Primary Care Provider: Cristina Ren MD Face-to-Face encounter date: 12/14/2019   Chief Complaint: OFF BALANCE (WOKE UP THIS AM LIKE THIS) and Eye Problem    Date of Discharge: 12/14/2019  Discharge Disposition: Home/Home with home health PT  Condition: Stable    Reason for Hospitalization   Primary Diagnosis: Left pontine lacunar infarct (basilar artery territory)    Secondary Diagnosis: Hypertension, DM II, Hyperlipidemia      Patient Active Problem List   Diagnosis    Hypertension associated with diabetes    COPD (chronic obstructive pulmonary disease)    Type 2 diabetes mellitus with hyperglycemia, with long-term current use of insulin    Proteinuria    Complete tear of left rotator cuff    Left shoulder pain    Shoulder stiffness, left    Shoulder weakness    Type 2 diabetes mellitus with diabetic nephropathy    Mixed hyperlipidemia    Continuous opioid dependence    Type 2 diabetes mellitus with both eyes affected by moderate nonproliferative retinopathy without macular edema, with long-term current use of insulin    CVA (cerebral vascular accident)    Cranial nerve III palsy, left       Brief History of Present Illness    Steff Johnson is a 59 y.o. AA female who  has a past medical history of Arthritis, COPD (chronic obstructive pulmonary disease), Diabetes mellitus, Hypertension, and Wears glasses.. The patient presented to [facility] on 12/12/2019 with a primary complaint of OFF BALANCE (WOKE UP THIS AM LIKE THIS) and Eye Problem    Patient was admitted for Acute ischemic stroke    For the full HPI please refer to the History & Physical from this admission.    Hospital Course By Problem with Pertinent Findings     Patient was admitted for stroke workup.  "Neurology was consulted. Patient had CT head done that did not show signs of bleeding. MRI brain showed Tiny pontine infarct . Further workup including CTA head ane neck did not show clinically significant stenosis, large vessel occlusion, or aneurysm. Echocardiography with bubble study did not reveal PFO or intraatrial septal defect or thrombus. Patient was observed on telemetry and no abnormal heart rhythm was noted. Patient was started on dual antiplatelet therapy with Aspirin and Plavix for 21 days then continue monotherapy with Aspirin. Patient was also evaluated by Physical therapist, occupational therapist and Speech therapy and recommended home health PT. Patient will be will discharged home.    The patient was discharged in the care of family/parents//wife, with discharge instructions were reviewed in written and verbal form. All pertinent questions were discussed and prescriptions were provided. The patient will follow up in 1 week or sooner as needed with the PCP and Neurologist as discussed, and the patient is on board with the plan.      Physical Exam  BP (!) 181/87 (BP Location: Right arm, Patient Position: Sitting)   Pulse 82   Temp 98 °F (36.7 °C) (Oral)   Resp 16   Ht 4' 11" (1.499 m)   Wt 60.3 kg (133 lb 0.8 oz)   SpO2 99%   Breastfeeding? No   BMI 26.87 kg/m²   Vitals reviewed.    Constitutional: No distress.   HENT: Atraumatic.   Cardiovascular: Normal rate, regular rhythm and normal heart sounds.   Pulmonary/Chest: Effort normal. Clear to auscultation bilaterally. No wheezes.   Abdominal: Soft. Bowel sounds are normal. Exhibits no distension and no mass. No tenderness  Neurological: Alert.   Skin: Skin is warm and dry.     Following labs were Reviewed   Recent Labs   Lab 12/14/19  0327   WBC 8.50  8.50   HGB 11.5*  11.5*   HCT 32.9*  32.9*   *  354*   CALCIUM 9.2  9.2   ALBUMIN 3.4*   PROT 7.1   *  133*   K 3.8  3.8   CO2 25  25     100   BUN 26*  26* "   CREATININE 1.1  1.1   ALKPHOS 70   ALT 15   AST 17   BILITOT 1.0     No results found for: POCTGLUCOSE     A1C:   Recent Labs   Lab 10/17/19  0954 10/30/19  0950 12/13/19  0356   HGBA1C 10.3* 10.4* 11.0*       Microbiology Results (last 7 days)     ** No results found for the last 168 hours. **        MRI Brain Without Contrast   Final Result      CTA Head and Neck (xpd)   Final Result      1.  No clinically significant stenosis, large vessel occlusion, or aneurysm is identified as outlined above.      2.  Tortuous internal carotid arteries in the neck.         Electronically signed by: Serg Brambila MD   Date:    12/12/2019   Time:    19:41      X-Ray Chest PA And Lateral   Final Result      No acute cardiac or pulmonary process.         Electronically signed by: Serg Brambila MD   Date:    12/12/2019   Time:    17:32      CT Head Without Contrast   Final Result      1. No acute intracranial process.      2. Chronic/involutional findings as noted above.         Electronically signed by: Serg Brambila MD   Date:    12/12/2019   Time:    16:55          No results found for this or any previous visit.      Consultants and Procedures   Consultants:  Dr. Magana    Procedures:   None    Discharge Information:   Diet:  Resume regular diet    Physical Activity:  Activity as tolerated    Instructions:  1. Take all medications as prescribed  2. Keep all follow-up appointments  3. Return to the hospital or call your primary care physicians if any worsening symptoms such as weakness, dizziness or numbness occur.      Follow-Up Appointments:  1. Please call your primary care physician to schedule an appointment in 1 week time.  2.     Please call your neurologist to schedule an appointment in 1 week time.    Pending laboratory work/Tests to be performed/followed by the Primary care Physician: None    The patient was discharged in the care of her parents//wife/family/caregiver, with discharge instructions were  reviewed in written and verbal form. All pertinent questions were discussed and prescriptions were provided. The importance of making follow up appointments and compliance of medications has been stressed repeatedly. The patient will follow up in 1 week or sooner as needed with the PCP, and the patient is on board with the plan. Upon discharge, patient needs to be on following medications.    Discharge Medications:     Medication List      ASK your doctor about these medications    amlodipine-valsartan  mg per tablet  Commonly known as:  EXFORGE  Take 1 tablet by mouth once daily.     atorvastatin 20 MG tablet  Commonly known as:  LIPITOR  Take 1 tablet (20 mg total) by mouth once daily.     blood sugar diagnostic Strp  To check BG 2 times daily, to use with insurance preferred meter     chlorthalidone 25 MG Tab  Commonly known as:  HYGROTEN  Take 2 tablets (50 mg total) by mouth once daily.     cloNIDine 0.3 mg/24 hr td ptwk 0.3 mg/24 hr  Commonly known as:  CATAPRES  Place 1 patch onto the skin every 7 days.     ergocalciferol 50,000 unit Cap  Commonly known as:  ERGOCALCIFEROL  Take 1 capsule (50,000 Units total) by mouth every 7 days.     fluticasone propionate 50 mcg/actuation nasal spray  Commonly known as:  FLONASE  1 spray (50 mcg total) by Each Nare route once daily.     hydrALAZINE 25 MG tablet  Commonly known as:  APRESOLINE  Take 1 tablet (25 mg total) by mouth 3 (three) times daily.     HYDROcodone-acetaminophen  mg per tablet  Commonly known as:  NORCO  Take 1 tablet by mouth every 6 (six) hours as needed for Pain.     insulin aspart U-100 100 unit/mL (3 mL) Inpn pen  Commonly known as:  NovoLOG Flexpen U-100 Insulin  Novolog 6 u AC + correction Max TDD 35u     insulin degludec 100 unit/mL (3 mL) Inpn  Commonly known as:  TRESIBA FLEXTOUCH U-100  Inject 30 Units into the skin once daily.     lancets Misc  To check BG 2 times daily, to use with insurance preferred meter     metoprolol  "succinate 200 MG 24 hr tablet  Commonly known as:  TOPROL-XL  Take 1 tablet (200 mg total) by mouth once daily.     pen needle, diabetic 32 gauge x 5/32" Ndle  Commonly known as:  BD Ronna 2nd Gen Pen Needle  Uses 4 daily     umeclidinium 62.5 mcg/actuation Dsdv  Commonly known as:  Incruse Ellipta  Inhale 1 each into the lungs once daily. Controller              I spent 30 minutes preparing the discharge including reviewing records from previous encounters, preparation of discharge summary, assessing and final examination of the patient, discharge medicine reconciliation, discussing plan of care, follow up and education and prescriptions.       Juan Alberto Iqbal  [facility] Hospitalist  12/14/2019          "

## 2019-12-15 NOTE — PLAN OF CARE
12/15/19 1513   Final Note   Assessment Type Final Discharge Note   Anticipated Discharge Disposition Home-Health     Pt discharged home after hours and SW following up with HH orders.  Contacted Pt at [phone] and advised her of HH orders at discharge.  Provided education on Medicare freedom of choice.  She verbalized understanding that referral will be sent to [facility], per insurance protocol.  Pt choice form completed with this information at 1510 and will be scanned to chart.  MANNY sent orders to [facility], and was later informed by Anna that HH was arranged through [facility].  No further needs addressed at this time.

## 2019-12-15 NOTE — NURSING
IV and tele moniter removed per extender. DC instructions given, verbalizes understanding. Pt awaiting transportation to home. Safety maintained, call light in reach, will cont to moniter.

## 2019-12-17 NOTE — PT/OT/SLP DISCHARGE
Occupational Therapy Discharge Summary    Steff Johnson  MRN: [id number]   Principal Problem: CVA (cerebral vascular accident)      Patient Discharged from acute Occupational Therapy on 12/14/2019.  Please refer to prior OT note dated 12/13/2019 for functional status.    Assessment:      Patient appropriate for care in another setting.    Objective:     GOALS:   Multidisciplinary Problems     Occupational Therapy Goals     Not on file          Multidisciplinary Problems (Resolved)        Problem: Occupational Therapy Goal    Goal Priority Disciplines Outcome Interventions   Occupational Therapy Goal   (Resolved)     OT, PT/OT Met    Description:  Goals to be met by: d/c     Patient will increase functional independence with ADLs by performing:    UE Dressing with Modified Starlight.  LE Dressing with Modified Starlight.  Grooming while standing with Modified Starlight.  Toileting from toilet with Modified Starlight for hygiene and clothing management.   Toilet transfer to toilet with Modified Starlight.                      Reasons for Discontinuation of Therapy Services  Transfer to alternate level of care.      Plan:     Patient Discharged to: Home with Home Health Service    Gifty Mccracken OT  12/17/2019

## 2019-12-19 ENCOUNTER — OFFICE VISIT (OUTPATIENT)
Dept: FAMILY MEDICINE | Facility: CLINIC | Age: 59
End: 2019-12-19
Payer: MEDICARE

## 2019-12-19 VITALS
TEMPERATURE: 98 F | SYSTOLIC BLOOD PRESSURE: 170 MMHG | RESPIRATION RATE: 12 BRPM | HEART RATE: 61 BPM | DIASTOLIC BLOOD PRESSURE: 90 MMHG | OXYGEN SATURATION: 97 % | BODY MASS INDEX: 26.66 KG/M2 | HEIGHT: 59 IN | WEIGHT: 132.25 LBS

## 2019-12-19 DIAGNOSIS — I15.2 HYPERTENSION ASSOCIATED WITH DIABETES: ICD-10-CM

## 2019-12-19 DIAGNOSIS — E11.59 HYPERTENSION ASSOCIATED WITH DIABETES: ICD-10-CM

## 2019-12-19 DIAGNOSIS — R26.81 GAIT INSTABILITY: Primary | ICD-10-CM

## 2019-12-19 DIAGNOSIS — Z79.4 TYPE 2 DIABETES MELLITUS WITH HYPERGLYCEMIA, WITH LONG-TERM CURRENT USE OF INSULIN: ICD-10-CM

## 2019-12-19 DIAGNOSIS — E11.65 TYPE 2 DIABETES MELLITUS WITH HYPERGLYCEMIA, WITH LONG-TERM CURRENT USE OF INSULIN: ICD-10-CM

## 2019-12-19 DIAGNOSIS — I63.9 CEREBROVASCULAR ACCIDENT (CVA) OF LEFT PONTINE STRUCTURE: ICD-10-CM

## 2019-12-19 DIAGNOSIS — H49.02 CRANIAL NERVE III PALSY, LEFT: ICD-10-CM

## 2019-12-19 PROCEDURE — 99999 PR PBB SHADOW E&M-EST. PATIENT-LVL IV: CPT | Mod: PBBFAC,,, | Performed by: FAMILY MEDICINE

## 2019-12-19 PROCEDURE — 99999 PR PBB SHADOW E&M-EST. PATIENT-LVL IV: ICD-10-PCS | Mod: PBBFAC,,, | Performed by: FAMILY MEDICINE

## 2019-12-19 PROCEDURE — 99213 OFFICE O/P EST LOW 20 MIN: CPT | Mod: S$GLB,,, | Performed by: FAMILY MEDICINE

## 2019-12-19 PROCEDURE — 99213 PR OFFICE/OUTPT VISIT, EST, LEVL III, 20-29 MIN: ICD-10-PCS | Mod: S$GLB,,, | Performed by: FAMILY MEDICINE

## 2019-12-19 RX ORDER — SPIRONOLACTONE 25 MG/1
25 TABLET ORAL DAILY
Qty: 30 TABLET | Refills: 11 | Status: SHIPPED | OUTPATIENT
Start: 2019-12-19 | End: 2021-02-11 | Stop reason: SDUPTHER

## 2019-12-19 RX ORDER — INSULIN DEGLUDEC 100 U/ML
32 INJECTION, SOLUTION SUBCUTANEOUS NIGHTLY
Qty: 12 ML | Refills: 5 | Status: SHIPPED | OUTPATIENT
Start: 2019-12-19 | End: 2020-01-18

## 2019-12-19 NOTE — PATIENT INSTRUCTIONS
Established High Blood Pressure    High blood pressure (hypertension) is a chronic disease. Often, healthcare providers dont know what causes it. But it can be caused by certain health conditions and medicines.  If you have high blood pressure, you may not have any symptoms. If you do have symptoms, they may include headache, dizziness, changes in your vision, chest pain, and shortness of breath. But even without symptoms, high blood pressure thats not treated raises your risk for heart attack and stroke. High blood pressure is a serious health risk and shouldnt be ignored.  A blood pressure reading is made up of two numbers: a higher number over a lower number. The top number is the systolic pressure. The bottom number is the diastolic pressure. A normal blood pressure is a systolic pressure of  less than 120 over a diastolic pressure of less than 80. You will see your blood pressure readings written together. For example, a person with a systolic pressure of 188 and a diastolic pressure of 78 will have 118/78 written in the medical record.  High blood pressure is when either the top number is 140 or higher, or the bottom number is 90 or higher. This must be the result when taking your blood pressure a number of times. The blood pressures between normal and high are called prehypertension.  Home care  If you have high blood pressure, you should do what is listed below to lower your blood pressure. If you are taking medicines for high blood pressure, these methods may reduce or end your need for medicines in the future.  · Begin a weight-loss program if you are overweight.  · Cut back on how much salt you get in your diet. Heres how to do this:  ¨ Dont eat foods that have a lot of salt. These include olives, pickles, smoked meats, and salted potato chips.  ¨ Dont add salt to your food at the table.  ¨ Use only small amounts of salt when cooking.  · Start an exercise program. Talk with your healthcare  provider about the type of exercise program that would be best for you. It doesn't have to be hard. Even brisk walking for 20 minutes 3 times a week is a good form of exercise.  · Dont take medicines that stimulate the heart. This includes many over-the-counter cold and sinus decongestant pills and sprays, as well as diet pills. Check the warnings about hypertension on the label. Before buying any over-the-counter medicines or supplements, always ask the pharmacist about the product's potential interaction with your high blood pressure and your high blood pressure medicines.  · Stimulants such as amphetamine or cocaine could be deadly for someone with high blood pressure. Never take these.  · Limit how much caffeine you get in your diet. Switch to caffeine-free products.  · Stop smoking. If you are a long-time smoker, this can be hard. Talk to your healthcare provider about medicines and nicotine replacement options to help you. Also, enroll in a stop-smoking program to make it more likely that you will quit for good.  · Learn how to handle stress. This is an important part of any program to lower blood pressure. Learn about relaxation methods like meditation, yoga, or biofeedback.  · If your provider prescribed medicines, take them exactly as directed. Missing doses may cause your blood pressure get out of control.  · If you miss a dose or doses, check with your healthcare provider or pharmacist about what to do.  · Consider buying an automatic blood pressure machine. Ask your provider for a recommendation. You can get one of these at most pharmacies.     The American Heart Association recommends the following guidelines for home blood pressure monitoring:  · Don't smoke or drink coffee for 30 minutes before taking your blood pressure.  · Go to the bathroom before the test.  · Relax for 5 minutes before taking the measurement.  · Sit with your back supported (don't sit on a couch or soft chair); keep your feet on  the floor uncrossed. Place your arm on a solid flat surface (like a table) with the upper part of the arm at heart level. Place the middle of the cuff directly above the eye of the elbow. Check the monitor's instruction manual for an illustration.  · Take multiple readings. When you measure, take 2 to 3 readings one minute apart and record all of the results.  · Take your blood pressure at the same time every day, or as your healthcare provider recommends.  · Record the date, time, and blood pressure reading.  · Take the record with you to your next medical appointment. If your blood pressure monitor has a built-in memory, simply take the monitor with you to your next appointment.  · Call your provider if you have several high readings. Don't be frightened by a single high blood pressure reading, but if you get several high readings, check in with your healthcare provider.  · Note: When blood pressure reaches a systolic (top number) of 180 or higher OR diastolic (bottom number) of 110 or higher, seek emergency medical treatment.  Follow-up care  You will need to see your healthcare provider regularly. This is to check your blood pressure and to make changes to your medicines. Make a follow-up appointment as directed. Bring the record of your home blood pressure readings to the appointment.  When to seek medical advice  Call your healthcare provider right away if any of these occur:  · Blood pressure reaches a systolic (upper number) of 180 or higher OR a diastolic (bottom number) of 110 or higher  · Chest pain or shortness of breath  · Severe headache  · Throbbing or rushing sound in the ears  · Nosebleed  · Sudden severe pain in your belly (abdomen)  · Extreme drowsiness, confusion, or fainting  · Dizziness or spinning sensation (vertigo)  · Weakness of an arm or leg or one side of the face  · You have problems speaking or seeing   Date Last Reviewed: 12/1/2016  © 2024-1604 [email or web address]. [address] [place] [address]  [place] [address]. All rights reserved. This information is not intended as a substitute for professional medical care. Always follow your healthcare professional's instructions.

## 2019-12-19 NOTE — PROGRESS NOTES
Subjective:       Patient ID: Steff Johnson is a 59 y.o. female.    Chief Complaint: Follow-up (1mth f/u hypertenson / diabetes)    HPI  Review of Systems   Constitutional: Negative for fatigue and unexpected weight change.   Respiratory: Negative for chest tightness and shortness of breath.    Cardiovascular: Negative for chest pain, palpitations and leg swelling.   Gastrointestinal: Negative for abdominal pain.   Musculoskeletal: Negative for arthralgias.   Neurological: Negative for dizziness, syncope, light-headedness and headaches.       Patient Active Problem List   Diagnosis    Hypertension associated with diabetes    COPD (chronic obstructive pulmonary disease)    Type 2 diabetes mellitus with hyperglycemia, with long-term current use of insulin    Proteinuria    Complete tear of left rotator cuff    Left shoulder pain    Shoulder stiffness, left    Shoulder weakness    Type 2 diabetes mellitus with diabetic nephropathy    Mixed hyperlipidemia    Continuous opioid dependence    Type 2 diabetes mellitus with both eyes affected by moderate nonproliferative retinopathy without macular edema, with long-term current use of insulin    CVA (cerebral vascular accident)    Cranial nerve III palsy, left     Patient is here for a chronic conditions follow up.    Admitted [facility] 12/12/19 for left pontine lacunar infarct basilar artery verified by MRI brain. Sx of being off balance and eye problem. Echo normal.  Started on asa and plavix x 21 d then asa.  Sent home with HH and PT/OT/ST. Neuro [facility].  Still having double vision and balance issues. Asking for walker    Endocrine Dr. Blanco-poorly controlled DM due to NC , vit d def.  A1c 12/13/19 11.  Lipids at goal. On ARB, lipitor 40 , plavix and asa today. BS high 350.  No < 70.  Avg before meals 240.  Taking tresiba 30 u at night and novolog 6-16 u with meals tid.      Ophth Dr. Rousseau Complications from DM-prolif retinopathy and  nephropathy     Nephro Dr. Echols CKD stage 3, malignant HTN        Pain mgmt Dr. Costello treating chronic shoulder pain norco 10 tid. Dps checked no evidence of diversion    Transitional Care Note    Family and/or Caretaker present at visit?  Yes.  Diagnostic tests reviewed/disposition: No diagnosic tests pending after this hospitalization.  Disease/illness education: yes  Home health/community services discussion/referrals: Patient does not have home health established from hospital visit.  They do need home health.  If needed, we will set up home health for the patient.   Establishment or re-establishment of referral orders for community resources: No other necessary community resources.   Discussion with other health care providers: No discussion with other health care providers necessary.     Patient has a recent hospitalization which is summarized above.  Communication (direct contact by telephone or electronic mail) with the patient and/or caregiver was documented within 2 business days of discharge.    Medical decision making was based on a face-to-face visit scheduled within the indicated time frame.  Medication reconciliation and management was completed at this visit.      Objective:      Physical Exam   Constitutional: She is oriented to person, place, and time. She appears well-developed and well-nourished.   Cardiovascular: Normal rate, regular rhythm and normal heart sounds.   Pulmonary/Chest: Effort normal and breath sounds normal.   Musculoskeletal: She exhibits no edema.   Neurological: She is alert and oriented to person, place, and time.   Wide based gait. Walks slowly holding on to  or touching wall frequently.  Left abducens nerve palsy= unable to move inward on right lateral eye gaze.     Skin: Skin is warm and dry.   Psychiatric: She has a normal mood and affect.   Nursing note and vitals reviewed.      Assessment:       1. Gait instability    2. Cerebrovascular accident (CVA) of  left pontine structure    3. Hypertension associated with diabetes    4. Type 2 diabetes mellitus with hyperglycemia, with long-term current use of insulin        Plan:       1. Gait instability  Fall precautions.  Order:  - WALKER FOR HOME USE  Cont HH and OT/PT  2. Cerebrovascular accident (CVA) of left pontine structure 12/19  See above  - WALKER FOR HOME USE    3. Hypertension associated with diabetes  Uncontrolled. Add  - spironolactone (ALDACTONE) 25 MG tablet; Take 1 tablet (25 mg total) by mouth once daily.  Dispense: 30 tablet; Refill: 11    4. Type 2 diabetes mellitus with hyperglycemia, with long-term current use of insulin  Uncontrolled.  Increase to  - insulin degludec (TRESIBA FLEXTOUCH U-100) 100 unit/mL (3 mL) InPn; Inject 32 Units into the skin every evening.  Dispense: 12 mL; Refill: 5  Monitor BS, adhere to ADA diet and f/u endocrine as planned  5. Cranial nerve III palsy, left  Monitor.  F/u neuro    Time spent with patient: 20 minutes    Patient with be reevaluated in 4 weeks or sooner prn    Greater than 50% of this visit was spent counseling as described in above documentation:Yes

## 2020-01-02 ENCOUNTER — HOSPITAL ENCOUNTER (EMERGENCY)
Facility: HOSPITAL | Age: 60
Discharge: HOME | End: 2020-01-02
Attending: EMERGENCY MEDICINE
Payer: MEDICARE

## 2020-01-02 VITALS
HEIGHT: 59 IN | OXYGEN SATURATION: 97 % | RESPIRATION RATE: 18 BRPM | SYSTOLIC BLOOD PRESSURE: 169 MMHG | HEART RATE: 71 BPM | BODY MASS INDEX: 27.82 KG/M2 | WEIGHT: 138 LBS | TEMPERATURE: 99 F | DIASTOLIC BLOOD PRESSURE: 100 MMHG

## 2020-01-02 DIAGNOSIS — R50.9 FEVER: ICD-10-CM

## 2020-01-02 DIAGNOSIS — J32.9 SINUSITIS, UNSPECIFIED CHRONICITY, UNSPECIFIED LOCATION: Primary | ICD-10-CM

## 2020-01-02 LAB
ALBUMIN SERPL BCP-MCNC: 3.8 G/DL (ref 3.5–5.2)
ALP SERPL-CCNC: 74 U/L (ref 55–135)
ALT SERPL W/O P-5'-P-CCNC: 18 U/L (ref 10–44)
ANION GAP SERPL CALC-SCNC: 9 MMOL/L (ref 8–16)
AST SERPL-CCNC: 19 U/L (ref 10–40)
BACTERIA #/AREA URNS HPF: ABNORMAL /HPF
BASOPHILS # BLD AUTO: 0.05 K/UL (ref 0–0.2)
BASOPHILS NFR BLD: 0.5 % (ref 0–1.9)
BILIRUB SERPL-MCNC: 0.7 MG/DL (ref 0.1–1)
BILIRUB UR QL STRIP: NEGATIVE
BUN SERPL-MCNC: 21 MG/DL (ref 8–23)
CALCIUM SERPL-MCNC: 9 MG/DL (ref 8.7–10.5)
CHLORIDE SERPL-SCNC: 105 MMOL/L (ref 95–110)
CLARITY UR: CLEAR
CO2 SERPL-SCNC: 24 MMOL/L (ref 23–29)
COLOR UR: YELLOW
CREAT SERPL-MCNC: 1 MG/DL (ref 0.5–1.4)
DIFFERENTIAL METHOD: ABNORMAL
EOSINOPHIL # BLD AUTO: 0.3 K/UL (ref 0–0.5)
EOSINOPHIL NFR BLD: 2.9 % (ref 0–8)
ERYTHROCYTE [DISTWIDTH] IN BLOOD BY AUTOMATED COUNT: 14 % (ref 11.5–14.5)
EST. GFR  (AFRICAN AMERICAN): >60 ML/MIN/1.73 M^2
EST. GFR  (NON AFRICAN AMERICAN): >60 ML/MIN/1.73 M^2
GLUCOSE SERPL-MCNC: 200 MG/DL (ref 70–110)
GLUCOSE UR QL STRIP: ABNORMAL
HCT VFR BLD AUTO: 33.3 % (ref 37–48.5)
HGB BLD-MCNC: 11.7 G/DL (ref 12–16)
HGB UR QL STRIP: NEGATIVE
HYALINE CASTS #/AREA URNS LPF: 3 /LPF
IMM GRANULOCYTES # BLD AUTO: 0.05 K/UL (ref 0–0.04)
IMM GRANULOCYTES NFR BLD AUTO: 0.5 % (ref 0–0.5)
INFLUENZA A, MOLECULAR: NEGATIVE
INFLUENZA B, MOLECULAR: NEGATIVE
KETONES UR QL STRIP: NEGATIVE
LEUKOCYTE ESTERASE UR QL STRIP: NEGATIVE
LYMPHOCYTES # BLD AUTO: 1.2 K/UL (ref 1–4.8)
LYMPHOCYTES NFR BLD: 12.5 % (ref 18–48)
MCH RBC QN AUTO: 27.3 PG (ref 27–31)
MCHC RBC AUTO-ENTMCNC: 35.1 G/DL (ref 32–36)
MCV RBC AUTO: 78 FL (ref 82–98)
MICROSCOPIC COMMENT: ABNORMAL
MONOCYTES # BLD AUTO: 1.4 K/UL (ref 0.3–1)
MONOCYTES NFR BLD: 13.9 % (ref 4–15)
NEUTROPHILS # BLD AUTO: 6.9 K/UL (ref 1.8–7.7)
NEUTROPHILS NFR BLD: 69.7 % (ref 38–73)
NITRITE UR QL STRIP: NEGATIVE
NRBC BLD-RTO: 0 /100 WBC
PH UR STRIP: 6 [PH] (ref 5–8)
PLATELET # BLD AUTO: 302 K/UL (ref 150–350)
PMV BLD AUTO: 10.5 FL (ref 9.2–12.9)
POTASSIUM SERPL-SCNC: 4 MMOL/L (ref 3.5–5.1)
PROT SERPL-MCNC: 7.6 G/DL (ref 6–8.4)
PROT UR QL STRIP: ABNORMAL
RBC # BLD AUTO: 4.29 M/UL (ref 4–5.4)
RBC #/AREA URNS HPF: 2 /HPF (ref 0–4)
SODIUM SERPL-SCNC: 138 MMOL/L (ref 136–145)
SP GR UR STRIP: 1.02 (ref 1–1.03)
SPECIMEN SOURCE: NORMAL
SQUAMOUS #/AREA URNS HPF: 12 /HPF
URN SPEC COLLECT METH UR: ABNORMAL
UROBILINOGEN UR STRIP-ACNC: NEGATIVE EU/DL
WBC # BLD AUTO: 9.92 K/UL (ref 3.9–12.7)
WBC #/AREA URNS HPF: 1 /HPF (ref 0–5)
YEAST URNS QL MICRO: ABNORMAL

## 2020-01-02 PROCEDURE — 36415 COLL VENOUS BLD VENIPUNCTURE: CPT

## 2020-01-02 PROCEDURE — 80053 COMPREHEN METABOLIC PANEL: CPT

## 2020-01-02 PROCEDURE — 87502 INFLUENZA DNA AMP PROBE: CPT

## 2020-01-02 PROCEDURE — 85025 COMPLETE CBC W/AUTO DIFF WBC: CPT

## 2020-01-02 PROCEDURE — 81001 URINALYSIS AUTO W/SCOPE: CPT

## 2020-01-02 PROCEDURE — 99284 EMERGENCY DEPT VISIT MOD MDM: CPT | Mod: 25

## 2020-01-02 PROCEDURE — 25000003 PHARM REV CODE 250: Performed by: EMERGENCY MEDICINE

## 2020-01-02 RX ORDER — AMOXICILLIN AND CLAVULANATE POTASSIUM 875; 125 MG/1; MG/1
1 TABLET, FILM COATED ORAL 2 TIMES DAILY
Qty: 14 TABLET | Refills: 0 | Status: SHIPPED | OUTPATIENT
Start: 2020-01-02 | End: 2020-02-19

## 2020-01-02 RX ADMIN — IBUPROFEN 600 MG: 400 TABLET, FILM COATED ORAL at 06:01

## 2020-01-03 NOTE — ED NOTES
Pt states she was turning around and lost her balance and hit her head on concrete. Denies LOC. Denies any other pain.

## 2020-01-03 NOTE — ED PROVIDER NOTES
Encounter Date: 1/2/2020       History     Chief Complaint   Patient presents with    Fall     fell at [facility] this morning-wants more testing done     Patient presents complaining of fever, congestion, weakness. Patient states she actually experienced a fall earlier today at [facility] [place].  She describes it as falling onto her back.  Patient states she thinks she may have hit the back of her head but she did not hit her head hard or lose consciousness.  She has no headache. She denies any nausea or vomiting.        Review of patient's allergies indicates:  No Known Allergies  Past Medical History:   Diagnosis Date    Arthritis     COPD (chronic obstructive pulmonary disease)     Diabetes mellitus     Hypertension     Wears glasses      Past Surgical History:   Procedure Laterality Date    COLONOSCOPY N/A 4/11/2017    Procedure: COLONOSCOPY;  Surgeon: Jared Antonio MD;  Location: [facility];  Service: Endoscopy;  Laterality: N/A;    HYSTERECTOMY      OOPHORECTOMY      SHOULDER SURGERY      R     Family History   Problem Relation Age of Onset    Diabetes Mother     Asthma Mother     Hypertension Mother     Diabetes Father     Diabetes Brother     Hypertension Brother     Anemia Daughter      Social History     Tobacco Use    Smoking status: Never Smoker    Smokeless tobacco: Never Used   Substance Use Topics    Alcohol use: No    Drug use: No     Review of Systems   Constitutional: Positive for fever.   Genitourinary: Negative for dysuria.   All other systems reviewed and are negative.      Physical Exam     Initial Vitals [01/02/20 1756]   BP Pulse Resp Temp SpO2   (!) 176/96 77 17 (!) 100.5 °F (38.1 °C) 98 %      MAP       --         Physical Exam    Nursing note and vitals reviewed.  Constitutional: She appears well-developed and well-nourished.   HENT:   Head: Normocephalic and atraumatic.   Mouth/Throat: Oropharynx is clear and moist.   Eyes: EOM are normal.   Neck: Normal  range of motion. Neck supple.   Cardiovascular: Normal rate, regular rhythm, normal heart sounds and intact distal pulses.   Pulmonary/Chest: Breath sounds normal. No respiratory distress.   Abdominal: Soft. Bowel sounds are normal.   Musculoskeletal: Normal range of motion.   Neurological: She is alert and oriented to person, place, and time. She has normal strength.   Skin: Skin is warm and dry. Capillary refill takes less than 2 seconds.   Psychiatric: She has a normal mood and affect. Her behavior is normal. Judgment and thought content normal.         ED Course   Procedures  Labs Reviewed   CBC W/ AUTO DIFFERENTIAL - Abnormal; Notable for the following components:       Result Value    Hemoglobin 11.7 (*)     Hematocrit 33.3 (*)     Mean Corpuscular Volume 78 (*)     Immature Grans (Abs) 0.05 (*)     Mono # 1.4 (*)     Lymph% 12.5 (*)     All other components within normal limits   COMPREHENSIVE METABOLIC PANEL - Abnormal; Notable for the following components:    Glucose 200 (*)     All other components within normal limits   URINALYSIS - Abnormal; Notable for the following components:    Protein, UA Trace (*)     Glucose, UA 3+ (*)     All other components within normal limits   URINALYSIS MICROSCOPIC - Abnormal; Notable for the following components:    Hyaline Casts, UA 3 (*)     All other components within normal limits   INFLUENZA A AND B ANTIGEN    Narrative:     Specimen Source->Nasopharyngeal Swab          Imaging Results          X-Ray Chest AP Portable (In process)    Procedure changed from X-Ray Chest 1 View                  Medical Decision Making:   ED Management:  Patient presenting with fever and fatigue.  Her flu test is negative.  Urinalysis is without signs to suggest infection.  White blood cell count is normal.  Chest x-ray with no definitive infiltrate.  Patient has been having sinus congestion source may be a sinusitis.  Patient will be discharged with antibiotics.  Was advised follow-up  with the primary care physician.  Patient states she did take her blood pressure medicine this morning she has running in the emergency department.  She has no numbness tingling or weakness or chest pain or shortness of breath. I did advise her she does need a blood pressure recheck with primary care doctor within the next couple of days.  Patient discharged stable condition.                                 Clinical Impression:       ICD-10-CM ICD-9-CM   1. Sinusitis, unspecified chronicity, unspecified location J32.9 473.9   2. Fever R50.9 780.60                             Jose A Fox MD  01/03/20 0713

## 2020-01-22 ENCOUNTER — OFFICE VISIT (OUTPATIENT)
Dept: FAMILY MEDICINE | Facility: CLINIC | Age: 60
End: 2020-01-22
Payer: MEDICARE

## 2020-01-22 VITALS
TEMPERATURE: 98 F | SYSTOLIC BLOOD PRESSURE: 162 MMHG | WEIGHT: 132.5 LBS | OXYGEN SATURATION: 97 % | HEIGHT: 59 IN | DIASTOLIC BLOOD PRESSURE: 100 MMHG | HEART RATE: 76 BPM | BODY MASS INDEX: 26.71 KG/M2

## 2020-01-22 DIAGNOSIS — Z79.4 TYPE 2 DIABETES MELLITUS WITH HYPERGLYCEMIA, WITH LONG-TERM CURRENT USE OF INSULIN: ICD-10-CM

## 2020-01-22 DIAGNOSIS — J06.9 UPPER RESPIRATORY TRACT INFECTION, UNSPECIFIED TYPE: ICD-10-CM

## 2020-01-22 DIAGNOSIS — Z91.148 NONCOMPLIANCE WITH MEDICATIONS: ICD-10-CM

## 2020-01-22 DIAGNOSIS — E11.65 TYPE 2 DIABETES MELLITUS WITH HYPERGLYCEMIA, WITH LONG-TERM CURRENT USE OF INSULIN: ICD-10-CM

## 2020-01-22 DIAGNOSIS — E11.59 HYPERTENSION ASSOCIATED WITH DIABETES: Primary | ICD-10-CM

## 2020-01-22 DIAGNOSIS — Z11.4 SCREENING FOR HIV (HUMAN IMMUNODEFICIENCY VIRUS): ICD-10-CM

## 2020-01-22 DIAGNOSIS — I15.2 HYPERTENSION ASSOCIATED WITH DIABETES: Primary | ICD-10-CM

## 2020-01-22 DIAGNOSIS — I63.9 CEREBROVASCULAR ACCIDENT (CVA) OF LEFT PONTINE STRUCTURE: ICD-10-CM

## 2020-01-22 PROCEDURE — 99214 OFFICE O/P EST MOD 30 MIN: CPT | Mod: S$GLB,,, | Performed by: NURSE PRACTITIONER

## 2020-01-22 PROCEDURE — 99999 PR PBB SHADOW E&M-EST. PATIENT-LVL III: ICD-10-PCS | Mod: PBBFAC,,, | Performed by: NURSE PRACTITIONER

## 2020-01-22 PROCEDURE — 99214 PR OFFICE/OUTPT VISIT, EST, LEVL IV, 30-39 MIN: ICD-10-PCS | Mod: S$GLB,,, | Performed by: NURSE PRACTITIONER

## 2020-01-22 PROCEDURE — 99999 PR PBB SHADOW E&M-EST. PATIENT-LVL III: CPT | Mod: PBBFAC,,, | Performed by: NURSE PRACTITIONER

## 2020-01-22 PROCEDURE — 99213 OFFICE O/P EST LOW 20 MIN: CPT | Mod: PBBFAC,PO | Performed by: NURSE PRACTITIONER

## 2020-01-22 RX ORDER — HYDRALAZINE HYDROCHLORIDE 50 MG/1
50 TABLET, FILM COATED ORAL 2 TIMES DAILY
Qty: 90 TABLET | Refills: 1 | Status: SHIPPED | OUTPATIENT
Start: 2020-01-22 | End: 2020-02-26 | Stop reason: SDUPTHER

## 2020-01-22 RX ORDER — INSULIN DEGLUDEC 100 U/ML
32 INJECTION, SOLUTION SUBCUTANEOUS NIGHTLY
COMMUNITY
End: 2020-05-28 | Stop reason: SDUPTHER

## 2020-01-22 NOTE — PROGRESS NOTES
"Subjective:       Patient ID: Steff Johnson is a 60 y.o. female.    Chief Complaint: Follow-up    Ms. Johnson is a 60-year-old female patient who presents today for a monthly chronic conditions follow-up.  Since her last visit with me, has been to ED 2 times, admitted to hospital once.  Seen by Dr. Ren after admission to [facility] for left pontine lacunar infarct basilar artery.  Most recent ED visit, treated for sinusitis and blood pressure noted to be high. Per note, did not take BP medications. Still having post nasal drip, hoarse. Was told to stop flonase when she was admitted to [facility]. Admits she did not complete antibiotic as prescribed. Still has 3-4 tabs left, prescribed Jan 2nd for one week. Should have completed antibiotic Jan 9. No fever, cough. Remains congested with clear rhinorrhea in the mornings.     Followed by Endocrinology for uncontrolled diabetes, has F/U scheduled with labs in February.  At last visit with Dr. Ren, reported high blood sugar readings. Tresiba was increased to 32 units and NovoLog continued on SS. Reports today she monitors BS at least every morning, no log today. States AM BG yesterday 169, a few days prior to that 130. Taking tresiba at night.    Blood pressure consistently uncontrolled. Spironolactone was added to her medications at last visit. Admits she forgets mid day dose of hydralazine. Does check at home. Typically runs 160s/90s, sometimes even over 200. Blood pressure remains elevated today. No medications today, did not take because she was "rushing to get here". Nephrology following malignant HTN and CKD 3.     She is agreeable to HIV screening. Consent obtained      Patient Active Problem List   Diagnosis    Hypertension associated with diabetes    COPD (chronic obstructive pulmonary disease)    Type 2 diabetes mellitus with hyperglycemia, with long-term current use of insulin    Proteinuria    Complete tear of left rotator cuff    Left shoulder pain    " Shoulder stiffness, left    Shoulder weakness    Type 2 diabetes mellitus with diabetic nephropathy    Mixed hyperlipidemia    Continuous opioid dependence    Type 2 diabetes mellitus with both eyes affected by moderate nonproliferative retinopathy without macular edema, with long-term current use of insulin    Cerebrovascular accident (CVA) of left pontine structure    Cranial nerve III palsy, left       Current Outpatient Medications:     amlodipine-valsartan (EXFORGE)  mg per tablet, Take 1 tablet by mouth once daily., Disp: 90 tablet, Rfl: 3    amoxicillin-clavulanate 875-125mg (AUGMENTIN) 875-125 mg per tablet, Take 1 tablet by mouth 2 (two) times daily., Disp: 14 tablet, Rfl: 0    aspirin 81 MG Chew, Take 1 tablet (81 mg total) by mouth once daily., Disp: 360 tablet, Rfl: 0    atorvastatin (LIPITOR) 40 MG tablet, Take 1 tablet (40 mg total) by mouth once daily., Disp: 90 tablet, Rfl: 3    chlorthalidone (HYGROTEN) 25 MG Tab, Take 2 tablets (50 mg total) by mouth once daily., Disp: 90 tablet, Rfl: 3    cloNIDine 0.3 mg/24 hr td ptwk (CATAPRES) 0.3 mg/24 hr, Place 1 patch onto the skin every 7 days., Disp: 4 patch, Rfl: 11    clopidogrel (PLAVIX) 75 mg tablet, Take 1 tablet (75 mg total) by mouth once daily., Disp: 30 tablet, Rfl: 11    hydrALAZINE (APRESOLINE) 50 MG tablet, Take 1 tablet (50 mg total) by mouth 2 (two) times daily., Disp: 90 tablet, Rfl: 1    HYDROcodone-acetaminophen (NORCO)  mg per tablet, Take 1 tablet by mouth every 6 (six) hours as needed for Pain., Disp: 40 tablet, Rfl: 0    insulin aspart U-100 (NOVOLOG FLEXPEN U-100 INSULIN) 100 unit/mL (3 mL) InPn pen, Novolog 6 u AC + correction Max TDD 35u (Patient taking differently: Inject 6-12 Units into the skin 3 (three) times daily before meals. Novolog 6 u AC + correction Max TDD 35u), Disp: 15 mL, Rfl: 12    insulin degludec (TRESIBA FLEXTOUCH U-100) 100 unit/mL (3 mL) InPn, Inject 32 Units into the skin every  "evening., Disp: , Rfl:     lancets Misc, To check BG 2 times daily, to use with insurance preferred meter, Disp: 200 each, Rfl: 3    metoprolol succinate (TOPROL-XL) 200 MG 24 hr tablet, Take 1 tablet (200 mg total) by mouth once daily., Disp: 90 tablet, Rfl: 3    pen needle, diabetic (BD MI 2ND GEN PEN NEEDLE) 32 gauge x 5/32" Ndle, Uses 4 daily, Disp: 400 each, Rfl: 4    spironolactone (ALDACTONE) 25 MG tablet, Take 1 tablet (25 mg total) by mouth once daily., Disp: 30 tablet, Rfl: 11    umeclidinium (INCRUSE ELLIPTA) 62.5 mcg/actuation DsDv, Inhale 1 each into the lungs once daily. Controller, Disp: 30 each, Rfl: 11    Lab Results   Component Value Date    WBC 9.92 01/02/2020    HGB 11.7 (L) 01/02/2020    HCT 33.3 (L) 01/02/2020     01/02/2020    CHOL 154 12/12/2019    TRIG 124 12/12/2019    HDL 66 12/12/2019    ALT 18 01/02/2020    AST 19 01/02/2020     01/02/2020    K 4.0 01/02/2020     01/02/2020    CREATININE 1.0 01/02/2020    BUN 21 01/02/2020    CO2 24 01/02/2020    TSH 1.240 12/12/2019    INR 1.1 12/13/2019    HGBA1C 11.0 (H) 12/13/2019     Review of Systems   Constitutional: Negative for fever.   HENT: Positive for congestion, postnasal drip, rhinorrhea and voice change (hoarse ). Negative for sinus pressure, sinus pain and sore throat.    Respiratory: Negative for cough, shortness of breath and wheezing.    Cardiovascular: Negative for chest pain.   Gastrointestinal: Negative for diarrhea, nausea and vomiting.   Neurological: Negative for dizziness, weakness and light-headedness.       Objective:      Physical Exam   Constitutional: She is oriented to person, place, and time. Vital signs are normal. She appears well-developed and well-nourished. No distress.   HENT:   Congested    Cardiovascular: Normal rate, regular rhythm and normal heart sounds.   Pulmonary/Chest: Effort normal and breath sounds normal. She has no wheezes.   Abdominal: Soft. Normal appearance. "   Musculoskeletal: She exhibits no edema.   Neurological: She is alert and oriented to person, place, and time.   Skin: Skin is warm and dry.   Psychiatric: She has a normal mood and affect.   Nursing note and vitals reviewed.      Assessment:       1. Hypertension associated with diabetes    2. Type 2 diabetes mellitus with hyperglycemia, with long-term current use of insulin    3. Cerebrovascular accident (CVA) of left pontine structure    4. Screening for HIV (human immunodeficiency virus)    5. Noncompliance with medications    6. Upper respiratory tract infection, unspecified type        Plan:       Steff was seen today for follow-up.    Diagnoses and all orders for this visit:    Hypertension associated with diabetes  -     hydrALAZINE (APRESOLINE) 50 MG tablet; Take 1 tablet (50 mg total) by mouth 2 (two) times daily.  Change hydralazine to BID dosing to help with compliance  I counseled the patient on HTN education, management and recommendations.  I recommended weight loss toward a BMI < 25, avoidance of salt and the DASH diet, regular cardio exercise a minimum of 150 minutes per week and medications if indicated.  Printed materials were given. The goal is < 140/90 unless otherwise specified.  She was provided with a blood pressure log today and asked to record blood pressure readings. Bring to next visit for review    Type 2 diabetes mellitus with hyperglycemia, with long-term current use of insulin  Patient reported BS improved. No log today. Provided with BS log, and educated on home monitoring. Asked to bring to all appointments  Continue medications as prescribed    Cerebrovascular accident (CVA) of left pontine structure  Seng discussion with patient regarding long term complications of uncontrolled HTN and diabetes    Screening for HIV (human immunodeficiency virus)  -     HIV 1/2 Ag/Ab (4th Gen); Future  Screen and treat as needed  Consent obtained    Noncompliance with medications  encouraged  medication compliance and discussed long term complications of uncontrolled chronic conditions.    Upper respiratory tract infection, unspecified type  I counseled the patient on general home care guidelines for cough and congestion including increasing fluid intake, getting plenty of rest and use of OTC cough and cold medications.  I recommended guafenesin for congestion and dextromethorphan as directed for cough.  A brand like Mucinex DM is recommended.  Avoidance of decongestants is recommended for patients with heart problems and hypertension.  Extra vitamin C may also benefit.  Return to clinic if symptoms last longer than 10 days or sooner if worsen with symptoms like fever > 100.4, severe sinus pain or headache, thick yellow nasal discharge or sputum, dehydration or lethargy.    Educated patient to avoid decongestants as this was raise BP  Recommend simply saline for nasal irrigation   Educated on proper antibiotic use     Patient readiness: acceptance and barriers:readiness    During the course of the visit the patient was educated and counseled about the following:     Diabetes:  Discussed general issues about diabetes pathophysiology and management.  Hypertension:   Medication: change hydralazine to BID dosing .  Check blood pressures 2 times daily and record.    Goals: Diabetes: Maintain Hemoglobin A1C below 7 and Hypertension: Reduce Blood Pressure    Did patient meet goals/outcomes: No    The following self management tools provided: blood pressure log  blood glucose log    Patient Instructions (the written plan) was given to the patient/family.     Time spent with patient: 30 minutes    Barriers to medications present (no )    Adverse reactions to current medications (no)    Over the counter medications reviewed (Yes)    Continue monthly follow up appointments

## 2020-02-05 ENCOUNTER — PATIENT OUTREACH (OUTPATIENT)
Dept: ADMINISTRATIVE | Facility: HOSPITAL | Age: 60
End: 2020-02-05

## 2020-02-05 NOTE — PROGRESS NOTES
Health Maintenance Due   Topic Date Due    HIV Screening  01/08/1975    TETANUS VACCINE  01/08/1976    Shingles Vaccine (1 of 2) 01/08/2008    Mammogram  12/13/2019

## 2020-02-06 ENCOUNTER — LAB VISIT (OUTPATIENT)
Dept: LAB | Facility: HOSPITAL | Age: 60
End: 2020-02-06
Attending: NURSE PRACTITIONER
Payer: COMMERCIAL

## 2020-02-06 DIAGNOSIS — Z79.4 TYPE 2 DIABETES MELLITUS WITH BOTH EYES AFFECTED BY MODERATE NONPROLIFERATIVE RETINOPATHY WITHOUT MACULAR EDEMA, WITH LONG-TERM CURRENT USE OF INSULIN: ICD-10-CM

## 2020-02-06 DIAGNOSIS — Z11.4 SCREENING FOR HIV (HUMAN IMMUNODEFICIENCY VIRUS): ICD-10-CM

## 2020-02-06 DIAGNOSIS — E11.3393 TYPE 2 DIABETES MELLITUS WITH BOTH EYES AFFECTED BY MODERATE NONPROLIFERATIVE RETINOPATHY WITHOUT MACULAR EDEMA, WITH LONG-TERM CURRENT USE OF INSULIN: ICD-10-CM

## 2020-02-06 LAB
ALBUMIN SERPL BCP-MCNC: 3.7 G/DL (ref 3.5–5.2)
ALP SERPL-CCNC: 94 U/L (ref 55–135)
ALT SERPL W/O P-5'-P-CCNC: 27 U/L (ref 10–44)
ANION GAP SERPL CALC-SCNC: 11 MMOL/L (ref 8–16)
AST SERPL-CCNC: 27 U/L (ref 10–40)
BILIRUB SERPL-MCNC: 0.5 MG/DL (ref 0.1–1)
BUN SERPL-MCNC: 15 MG/DL (ref 8–23)
CALCIUM SERPL-MCNC: 9.3 MG/DL (ref 8.7–10.5)
CHLORIDE SERPL-SCNC: 108 MMOL/L (ref 95–110)
CO2 SERPL-SCNC: 24 MMOL/L (ref 23–29)
CREAT SERPL-MCNC: 1 MG/DL (ref 0.5–1.4)
EST. GFR  (AFRICAN AMERICAN): >60 ML/MIN/1.73 M^2
EST. GFR  (NON AFRICAN AMERICAN): >60 ML/MIN/1.73 M^2
ESTIMATED AVG GLUCOSE: 243 MG/DL (ref 68–131)
GLUCOSE SERPL-MCNC: 129 MG/DL (ref 70–110)
HBA1C MFR BLD HPLC: 10.1 % (ref 4–5.6)
HIV 1+2 AB+HIV1 P24 AG SERPL QL IA: NEGATIVE
POTASSIUM SERPL-SCNC: 4.7 MMOL/L (ref 3.5–5.1)
PROT SERPL-MCNC: 7.9 G/DL (ref 6–8.4)
SODIUM SERPL-SCNC: 143 MMOL/L (ref 136–145)

## 2020-02-06 PROCEDURE — 36415 COLL VENOUS BLD VENIPUNCTURE: CPT | Mod: PO

## 2020-02-06 PROCEDURE — 86703 HIV-1/HIV-2 1 RESULT ANTBDY: CPT

## 2020-02-06 PROCEDURE — 80053 COMPREHEN METABOLIC PANEL: CPT

## 2020-02-06 PROCEDURE — 83036 HEMOGLOBIN GLYCOSYLATED A1C: CPT

## 2020-02-11 ENCOUNTER — PATIENT OUTREACH (OUTPATIENT)
Dept: ADMINISTRATIVE | Facility: OTHER | Age: 60
End: 2020-02-11

## 2020-02-12 NOTE — PROGRESS NOTES
CC: This 60 y.o. female presents for management of diabetes  and chronic conditions pending review including HTN, HLP, nephropathy     HPI: She was diagnosed with T2DM in 2014. Has never been hospitalized r/t DM.  Family hx of DM: mom, dad, brothers, and sister      Had stroke last month  Residuals -Double vision and and numbness and in right leg  hypoglycemia at home-none    monitoring BG at home: 4 times a day               Diet: Eats 3 meals a day, snacks-has really made an effort to stop snacking   Exercise: none  CURRENT DM MEDS: tresiba 32 u qhs, Novolog  6 u AC   Vial/pen:  Uses pens  Glucometer type:  True Metrix     Standards of Care:  Eye exam: 9/2019 + DM retinopathy, + cataracts       Follows w Dr Ruthann barclay nephrology     Retired from [employer]- [employer] de[employer] (worked for them for 20 yrs)      ROS:   Gen: Appetite good, + weight loss 3 lbs, denies fatigue and weakness.  Skin: Skin is intact and heals well, no rashes, no hair changes  Eyes: +visual disturbances  Resp: no SOB or SEGUNDO, no cough  Cardiac: No palpitations, chest pain, no edema   GI: No nausea or vomiting, diarrhea, constipation, or abdominal pain.  /GYN: 2-3+ nocturia, no burning or pain.   MS/Neuro: Denies numbness/ tingling in BLE; Gait steady, speech clear  Psych: Denies drug/ETOH abuse, no hx of depression.  Other systems: negative.    PE:  GENERAL: Well developed, well nourished.  PSYCH: AAOx3, appropriate mood and affect, pleasant expression, conversant, appears relaxed, well groomed.   EYES: Conjunctiva, corneas clear  NECK: Supple, trachea midline  NEURO: Gait steady  SKIN: Skin warm and dry no acanthosis nigracans.  FOOT EXAMINATION: 11/2019  No foot deformity, corns or callus formation,  nails in good condition and well trimmed, no interspace maceration or ulceration noted.  Decreased hair growth present over toes/feet.  Positive vibratory response to 128 Hz tuning fork bilaterally.  Protective sensation intact with 10  gram monofilament.  +2 dorsalis pedis and posterior pulses noted.      Lab Results   Component Value Date    MICALBCREAT 25.7 07/03/2019       Hemoglobin A1C   Date Value Ref Range Status   02/06/2020 10.1 (H) 4.0 - 5.6 % Final     Comment:     ADA Screening Guidelines:  5.7-6.4%  Consistent with prediabetes  >or=6.5%  Consistent with diabetes  High levels of fetal hemoglobin interfere with the HbA1C  assay. Heterozygous hemoglobin variants (HbS, HgC, etc)do  not significantly interfere with this assay.   However, presence of multiple variants may affect accuracy.     12/13/2019 11.0 (H) 4.5 - 6.2 % Final     Comment:     According to ADA guidelines, hemoglobin A1C <7.0% represents  optimal control in non-pregnant diabetic patients.  Different  metrics may apply to specific populations.   Standards of Medical Care in Diabetes - 2016.  For the purpose of screening for the presence of diabetes:  <5.7%     Consistent with the absence of diabetes  5.7-6.4%  Consistent with increasing risk for diabetes   (prediabetes)  >or=6.5%  Consistent with diabetes  Currently no consensus exists for use of hemoglobin A1C  for diagnosis of diabetes for children.     10/30/2019 10.4 (H) 4.0 - 5.6 % Final     Comment:     ADA Screening Guidelines:  5.7-6.4%  Consistent with prediabetes  >or=6.5%  Consistent with diabetes  High levels of fetal hemoglobin interfere with the HbA1C  assay. Heterozygous hemoglobin variants (HbS, HgC, etc)do  not significantly interfere with this assay.   However, presence of multiple variants may affect accuracy.          ASSESSMENT and PLAN:    1. T2DM with hyperglycemia, DM nephropathy, Dm retinopathy  Continue [id number] u qhs, Increase Novolog to 6-8-10 u Ac + correction  Rotate injection sites  Check bg 4 times a day    Discussed A1c and BG goals.   - takes  Arb, statin  A1C and bg log never correlate     2. HTN - uncontrolled, continue meds as previously prescribed and monitor. Has not taken her meds  yet this am    3. HLP - on statin therapy, LFTs WNL    4. COPD- No current steroids    5.  Dm nephropathy follows ning Echols     6. Vitamin d deficiency-Continue ergocalciferol 84468 IU weekly    7. CVA- optimize bg    Follow-up: in 3 months with lab prior

## 2020-02-13 ENCOUNTER — OFFICE VISIT (OUTPATIENT)
Dept: ENDOCRINOLOGY | Facility: CLINIC | Age: 60
End: 2020-02-13
Payer: COMMERCIAL

## 2020-02-13 VITALS
HEIGHT: 59 IN | SYSTOLIC BLOOD PRESSURE: 164 MMHG | BODY MASS INDEX: 26.66 KG/M2 | DIASTOLIC BLOOD PRESSURE: 120 MMHG | HEART RATE: 76 BPM | WEIGHT: 132.25 LBS | TEMPERATURE: 98 F

## 2020-02-13 DIAGNOSIS — Z79.4 TYPE 2 DIABETES MELLITUS WITH HYPERGLYCEMIA, WITH LONG-TERM CURRENT USE OF INSULIN: ICD-10-CM

## 2020-02-13 DIAGNOSIS — E78.2 MIXED HYPERLIPIDEMIA: ICD-10-CM

## 2020-02-13 DIAGNOSIS — I15.2 HYPERTENSION ASSOCIATED WITH DIABETES: ICD-10-CM

## 2020-02-13 DIAGNOSIS — E11.65 TYPE 2 DIABETES MELLITUS WITH HYPERGLYCEMIA, WITH LONG-TERM CURRENT USE OF INSULIN: ICD-10-CM

## 2020-02-13 DIAGNOSIS — E11.21 TYPE 2 DIABETES MELLITUS WITH DIABETIC NEPHROPATHY, WITH LONG-TERM CURRENT USE OF INSULIN: Primary | ICD-10-CM

## 2020-02-13 DIAGNOSIS — I63.9 CEREBROVASCULAR ACCIDENT (CVA) OF LEFT PONTINE STRUCTURE: ICD-10-CM

## 2020-02-13 DIAGNOSIS — E11.3393 TYPE 2 DIABETES MELLITUS WITH BOTH EYES AFFECTED BY MODERATE NONPROLIFERATIVE RETINOPATHY WITHOUT MACULAR EDEMA, WITH LONG-TERM CURRENT USE OF INSULIN: ICD-10-CM

## 2020-02-13 DIAGNOSIS — Z79.4 TYPE 2 DIABETES MELLITUS WITH DIABETIC NEPHROPATHY, WITH LONG-TERM CURRENT USE OF INSULIN: Primary | ICD-10-CM

## 2020-02-13 DIAGNOSIS — J44.9 CHRONIC OBSTRUCTIVE PULMONARY DISEASE, UNSPECIFIED COPD TYPE: ICD-10-CM

## 2020-02-13 DIAGNOSIS — E11.59 HYPERTENSION ASSOCIATED WITH DIABETES: ICD-10-CM

## 2020-02-13 DIAGNOSIS — Z79.4 TYPE 2 DIABETES MELLITUS WITH BOTH EYES AFFECTED BY MODERATE NONPROLIFERATIVE RETINOPATHY WITHOUT MACULAR EDEMA, WITH LONG-TERM CURRENT USE OF INSULIN: ICD-10-CM

## 2020-02-13 PROCEDURE — 99214 OFFICE O/P EST MOD 30 MIN: CPT | Mod: S$GLB,,, | Performed by: NURSE PRACTITIONER

## 2020-02-13 PROCEDURE — 99214 PR OFFICE/OUTPT VISIT, EST, LEVL IV, 30-39 MIN: ICD-10-PCS | Mod: S$GLB,,, | Performed by: NURSE PRACTITIONER

## 2020-02-13 PROCEDURE — 99999 PR PBB SHADOW E&M-EST. PATIENT-LVL IV: CPT | Mod: PBBFAC,,, | Performed by: NURSE PRACTITIONER

## 2020-02-13 PROCEDURE — 99999 PR PBB SHADOW E&M-EST. PATIENT-LVL IV: ICD-10-PCS | Mod: PBBFAC,,, | Performed by: NURSE PRACTITIONER

## 2020-02-13 PROCEDURE — 99214 OFFICE O/P EST MOD 30 MIN: CPT | Mod: PBBFAC,PO | Performed by: NURSE PRACTITIONER

## 2020-02-13 RX ORDER — INSULIN ASPART 100 [IU]/ML
INJECTION, SOLUTION INTRAVENOUS; SUBCUTANEOUS
Qty: 15 ML | Refills: 12 | Status: SHIPPED | OUTPATIENT
Start: 2020-02-13 | End: 2020-06-11

## 2020-02-19 ENCOUNTER — OFFICE VISIT (OUTPATIENT)
Dept: FAMILY MEDICINE | Facility: CLINIC | Age: 60
End: 2020-02-19
Payer: COMMERCIAL

## 2020-02-19 VITALS
BODY MASS INDEX: 26.75 KG/M2 | TEMPERATURE: 98 F | OXYGEN SATURATION: 98 % | RESPIRATION RATE: 12 BRPM | WEIGHT: 132.69 LBS | HEART RATE: 83 BPM | HEIGHT: 59 IN | SYSTOLIC BLOOD PRESSURE: 139 MMHG | DIASTOLIC BLOOD PRESSURE: 80 MMHG

## 2020-02-19 DIAGNOSIS — E11.3393 TYPE 2 DIABETES MELLITUS WITH BOTH EYES AFFECTED BY MODERATE NONPROLIFERATIVE RETINOPATHY WITHOUT MACULAR EDEMA, WITH LONG-TERM CURRENT USE OF INSULIN: ICD-10-CM

## 2020-02-19 DIAGNOSIS — E11.59 HYPERTENSION ASSOCIATED WITH DIABETES: ICD-10-CM

## 2020-02-19 DIAGNOSIS — R26.81 GAIT INSTABILITY: Primary | ICD-10-CM

## 2020-02-19 DIAGNOSIS — I63.9 CEREBROVASCULAR ACCIDENT (CVA) OF LEFT PONTINE STRUCTURE: ICD-10-CM

## 2020-02-19 DIAGNOSIS — Z79.4 TYPE 2 DIABETES MELLITUS WITH BOTH EYES AFFECTED BY MODERATE NONPROLIFERATIVE RETINOPATHY WITHOUT MACULAR EDEMA, WITH LONG-TERM CURRENT USE OF INSULIN: ICD-10-CM

## 2020-02-19 DIAGNOSIS — I15.2 HYPERTENSION ASSOCIATED WITH DIABETES: ICD-10-CM

## 2020-02-19 PROCEDURE — 99214 PR OFFICE/OUTPT VISIT, EST, LEVL IV, 30-39 MIN: ICD-10-PCS | Mod: S$GLB,,, | Performed by: FAMILY MEDICINE

## 2020-02-19 PROCEDURE — 99999 PR PBB SHADOW E&M-EST. PATIENT-LVL IV: CPT | Mod: PBBFAC,,, | Performed by: FAMILY MEDICINE

## 2020-02-19 PROCEDURE — 99214 OFFICE O/P EST MOD 30 MIN: CPT | Mod: PBBFAC,PO | Performed by: FAMILY MEDICINE

## 2020-02-19 PROCEDURE — 99999 PR PBB SHADOW E&M-EST. PATIENT-LVL IV: ICD-10-PCS | Mod: PBBFAC,,, | Performed by: FAMILY MEDICINE

## 2020-02-19 PROCEDURE — 99214 OFFICE O/P EST MOD 30 MIN: CPT | Mod: S$GLB,,, | Performed by: FAMILY MEDICINE

## 2020-02-19 NOTE — PROGRESS NOTES
Subjective:       Patient ID: Steff Johnson is a 60 y.o. female.    Chief Complaint: Follow-up (1mth f/u diabetes / hypertension)    HPI  Review of Systems   Constitutional: Negative for fatigue and unexpected weight change.   Respiratory: Negative for chest tightness and shortness of breath.    Cardiovascular: Negative for chest pain, palpitations and leg swelling.   Gastrointestinal: Negative for abdominal pain.   Musculoskeletal: Negative for arthralgias.   Neurological: Negative for dizziness, syncope, light-headedness and headaches.       Patient Active Problem List   Diagnosis    Hypertension associated with diabetes    COPD (chronic obstructive pulmonary disease)    Type 2 diabetes mellitus with hyperglycemia, with long-term current use of insulin    Proteinuria    Complete tear of left rotator cuff    Left shoulder pain    Shoulder stiffness, left    Shoulder weakness    Type 2 diabetes mellitus with diabetic nephropathy    Mixed hyperlipidemia    Continuous opioid dependence    Type 2 diabetes mellitus with both eyes affected by moderate nonproliferative retinopathy without macular edema, with long-term current use of insulin    Cerebrovascular accident (CVA) of left pontine structure    Cranial nerve III palsy, left     Patient is here for a chronic conditions follow up.   Since visit 1 month ago she was seen in ED 1/3/20 for fever, sinus infections and slip and fall at [facility] Admitted [facility] 12/12/19 for left pontine lacunar infarct basilar artery verified by MRI brain. Sx of being off balance and eye problem. Echo normal.  Started on asa and plavix x 21 d then asa.  Sent home with HH and PT/OT/ST. Neuro [facility].  Still having double vision and balance issues. Never got  Walker.      Endocrine Dr. Blanco-poorly controlled DM due to NC , vit d def.  A1c 12/13/19 11 now 10.1 on 2/6/20.  Lipids at goal. On ARB, lipitor 40 , plavix and asa today. BS high 350.  No < 70.  Taking  tresiba 32 u at night and novolog 6-8-10 + correction per SS u with meals tid.  checking BS qid. Last endocrine appt 2/13/20 . Next appt with previsit labs 5/20      Ophth Dr. Rousseau Complications from DM-prolif retinopathy and nephropathy     Nephro Dr. Echols CKD stage 3, malignant HTN         Pain mgmt Dr. Costello treating chronic shoulder pain norco 10 tid. Dps checked no evidence of diversion       Mammo scheduled 3/20   Objective:      Physical Exam   Constitutional: She is oriented to person, place, and time. She appears well-developed and well-nourished.   Cardiovascular: Normal rate, regular rhythm and normal heart sounds.   Pulmonary/Chest: Effort normal and breath sounds normal.   Musculoskeletal: She exhibits no edema.   Neurological: She is alert and oriented to person, place, and time.   Skin: Skin is warm and dry.   Psychiatric: She has a normal mood and affect.   Nursing note and vitals reviewed.      Assessment:       1. Gait instability    2. Type 2 diabetes mellitus with both eyes affected by moderate nonproliferative retinopathy without macular edema, with long-term current use of insulin    3. Cerebrovascular accident (CVA) of left pontine structure    4. Hypertension associated with diabetes        Plan:         1. Gait instability  Cont fall precautions.  Use walker for stability  - WALKER FOR HOME USE    2. Type 2 diabetes mellitus with both eyes affected by moderate nonproliferative retinopathy without macular edema, with long-term current use of insulin  Improving control. Keep working with endocrine    3. Cerebrovascular accident (CVA) of left pontine structure  Residuals improving    4. Hypertension associated with diabetes  Controlled on current medications.  Continue current medications.    .      Time spent with patient: 20 minutes    Patient with be reevaluated in 6 months or sooner prn    Greater than 50% of this visit was spent counseling as described in above  documentation:Yes

## 2020-02-20 ENCOUNTER — TELEPHONE (OUTPATIENT)
Dept: FAMILY MEDICINE | Facility: CLINIC | Age: 60
End: 2020-02-20

## 2020-02-20 NOTE — TELEPHONE ENCOUNTER
----- Message from Lesley Dunbar sent at 2/20/2020  3:31 PM CST -----  Type: Needs Medical Advice    Who Called:  patient  Best Call Back Number: [phone]  Additional Information: patient states that a walker with a seat was ordered for her. She wants to know if she has to pick it up or will it be delivered to her home. Please give call back

## 2020-02-20 NOTE — TELEPHONE ENCOUNTER
Can you check on the status of this? It was faxed over on 2/19/2020. I want to make sure it's covered by her insurance or if we need to send it somewhere else. Also, will it be delivered or will she have to pick it up? Thanks!

## 2020-02-23 ENCOUNTER — PATIENT OUTREACH (OUTPATIENT)
Dept: ADMINISTRATIVE | Facility: OTHER | Age: 60
End: 2020-02-23

## 2020-02-26 ENCOUNTER — OFFICE VISIT (OUTPATIENT)
Dept: NEPHROLOGY | Facility: CLINIC | Age: 60
End: 2020-02-26
Payer: COMMERCIAL

## 2020-02-26 VITALS
WEIGHT: 133.38 LBS | HEART RATE: 66 BPM | SYSTOLIC BLOOD PRESSURE: 160 MMHG | OXYGEN SATURATION: 99 % | HEIGHT: 59 IN | DIASTOLIC BLOOD PRESSURE: 94 MMHG | BODY MASS INDEX: 26.89 KG/M2

## 2020-02-26 DIAGNOSIS — E78.2 MIXED HYPERLIPIDEMIA: ICD-10-CM

## 2020-02-26 DIAGNOSIS — Z79.4 TYPE 2 DIABETES MELLITUS WITH BOTH EYES AFFECTED BY MODERATE NONPROLIFERATIVE RETINOPATHY WITHOUT MACULAR EDEMA, WITH LONG-TERM CURRENT USE OF INSULIN: ICD-10-CM

## 2020-02-26 DIAGNOSIS — E11.21 TYPE 2 DIABETES MELLITUS WITH DIABETIC NEPHROPATHY, WITH LONG-TERM CURRENT USE OF INSULIN: ICD-10-CM

## 2020-02-26 DIAGNOSIS — J44.9 CHRONIC OBSTRUCTIVE PULMONARY DISEASE, UNSPECIFIED COPD TYPE: ICD-10-CM

## 2020-02-26 DIAGNOSIS — I15.2 HYPERTENSION ASSOCIATED WITH DIABETES: ICD-10-CM

## 2020-02-26 DIAGNOSIS — Z79.4 TYPE 2 DIABETES MELLITUS WITH DIABETIC NEPHROPATHY, WITH LONG-TERM CURRENT USE OF INSULIN: ICD-10-CM

## 2020-02-26 DIAGNOSIS — E11.3393 TYPE 2 DIABETES MELLITUS WITH BOTH EYES AFFECTED BY MODERATE NONPROLIFERATIVE RETINOPATHY WITHOUT MACULAR EDEMA, WITH LONG-TERM CURRENT USE OF INSULIN: ICD-10-CM

## 2020-02-26 DIAGNOSIS — I63.9 CEREBROVASCULAR ACCIDENT (CVA) OF LEFT PONTINE STRUCTURE: ICD-10-CM

## 2020-02-26 DIAGNOSIS — R80.1 PERSISTENT PROTEINURIA: Primary | ICD-10-CM

## 2020-02-26 DIAGNOSIS — E11.59 HYPERTENSION ASSOCIATED WITH DIABETES: ICD-10-CM

## 2020-02-26 PROCEDURE — 99999 PR PBB SHADOW E&M-EST. PATIENT-LVL III: ICD-10-PCS | Mod: PBBFAC,,, | Performed by: INTERNAL MEDICINE

## 2020-02-26 PROCEDURE — 99999 PR PBB SHADOW E&M-EST. PATIENT-LVL III: CPT | Mod: PBBFAC,,, | Performed by: INTERNAL MEDICINE

## 2020-02-26 PROCEDURE — 99213 OFFICE O/P EST LOW 20 MIN: CPT | Mod: S$GLB,,, | Performed by: INTERNAL MEDICINE

## 2020-02-26 PROCEDURE — 99213 PR OFFICE/OUTPT VISIT, EST, LEVL III, 20-29 MIN: ICD-10-PCS | Mod: S$GLB,,, | Performed by: INTERNAL MEDICINE

## 2020-02-26 PROCEDURE — 99213 OFFICE O/P EST LOW 20 MIN: CPT | Mod: PBBFAC,PO | Performed by: INTERNAL MEDICINE

## 2020-02-26 RX ORDER — HYDRALAZINE HYDROCHLORIDE 50 MG/1
50 TABLET, FILM COATED ORAL 3 TIMES DAILY
Qty: 90 TABLET | Refills: 1 | Status: SHIPPED | OUTPATIENT
Start: 2020-02-26 | End: 2021-02-11 | Stop reason: SDUPTHER

## 2020-02-26 RX ORDER — CLOBETASOL PROPIONATE 0.5 MG/G
OINTMENT TOPICAL
COMMUNITY
Start: 2020-02-24 | End: 2021-11-23

## 2020-02-26 RX ORDER — FLUOCINONIDE 1 MG/G
CREAM TOPICAL
COMMUNITY
Start: 2020-02-24 | End: 2021-11-23

## 2020-02-26 NOTE — PROGRESS NOTES
Subjective:       Patient ID: Steff Johnson is a 60 y.o. Black or  female who presents for new evaluation of Proteinuria    jkj    Hypertension   Pertinent negatives include no chest pain, headaches or shortness of breath.   She is referred by her PCP for proteinuria.   She has a significant history of DM and HTN, last Hba1c was 8.5 which improved from 11 previously. She denies foamy urine, no hematuria and no frequent UTIs. She follows a low sodium diet but admits she does not ensure hydration (dislikes water) Occasional NSAID use but no herbal medications. She was a premature infant. She received PRBCs before 1972. No known kidney disease in her family    Interval history: She reports she is feeling well. No foamy urine. Last Hba1c was 9.6, down from 10.3. No LE edema and no SOB    Interval history Dec 2018: She denies HTN symptoms to include CP, HA, SOB, and blurry vision. No LE edema. She was unable to provide a urine specimen     Interval history Feb 2020: Lost to follow up. She is doing well. Last Hba1c was 10.1, down from 11. She is s/p CVA last Winter. No LE edema and no SOB.     Review of Systems   Constitutional: Negative for activity change, appetite change, fatigue and unexpected weight change.   HENT: Negative for facial swelling.    Respiratory: Negative for shortness of breath.    Cardiovascular: Negative for chest pain and leg swelling.   Gastrointestinal: Positive for constipation. Negative for diarrhea.   Genitourinary: Negative for difficulty urinating, dysuria, flank pain and hematuria.   Musculoskeletal: Positive for arthralgias and back pain.   Neurological: Negative for weakness and headaches.       Objective:      Physical Exam   Constitutional: She is oriented to person, place, and time. She appears well-nourished. No distress.   HENT:   Mouth/Throat: Oropharynx is clear and moist.   Neck: No JVD present.   Cardiovascular: Regular rhythm, S1 normal and S2 normal. Exam  reveals no friction rub.   Pulmonary/Chest: Breath sounds normal. She has no wheezes. She has no rales.   Abdominal: Soft.   Musculoskeletal: She exhibits no edema.   Neurological: She is alert and oriented to person, place, and time.   Skin: Skin is warm and dry.   Psychiatric: She has a normal mood and affect.   Vitals reviewed.      Assessment:       1. Persistent proteinuria    2. Hypertension associated with diabetes    3. Chronic obstructive pulmonary disease, unspecified COPD type    4. Type 2 diabetes mellitus with diabetic nephropathy, with long-term current use of insulin    5. Mixed hyperlipidemia    6. Cerebrovascular accident (CVA) of left pontine structure    7. Type 2 diabetes mellitus with both eyes affected by moderate nonproliferative retinopathy without macular edema, with long-term current use of insulin        Plan:           DM nephropathy. Continue RAAS blockade (valsartan and Aldactone) for renal preservation.     HTN--per PCP      RTC 6mo

## 2020-02-27 ENCOUNTER — TELEPHONE (OUTPATIENT)
Dept: FAMILY MEDICINE | Facility: CLINIC | Age: 60
End: 2020-02-27

## 2020-02-27 NOTE — TELEPHONE ENCOUNTER
Left voicemail for patient that I checked with dme dept and they should have the rollator delivered tomorrow so she may get a phone call about setting up delivery.

## 2020-02-27 NOTE — TELEPHONE ENCOUNTER
----- Message from Gauri Greer sent at 2/27/2020  7:42 AM CST -----  Contact: self  Type: Needs Medical Advice    Who Called:  self  Symptoms (please be specific):    How long has patient had these symptoms:    Pharmacy name and phone #:    Best Call Back Number: [phone] (home)   Additional Information: Patient is calling regarding the status of the wheel chair ordered. Patient is not sure where it is coming from. Please call patient. Thanks!

## 2020-03-09 ENCOUNTER — HOSPITAL ENCOUNTER (OUTPATIENT)
Dept: RADIOLOGY | Facility: CLINIC | Age: 60
Discharge: HOME | End: 2020-03-09
Attending: FAMILY MEDICINE
Payer: MEDICARE

## 2020-03-09 DIAGNOSIS — Z12.31 SCREENING MAMMOGRAM, ENCOUNTER FOR: ICD-10-CM

## 2020-03-09 PROCEDURE — 77063 BREAST TOMOSYNTHESIS BI: CPT | Mod: 26,,, | Performed by: RADIOLOGY

## 2020-03-09 PROCEDURE — 77067 MAMMO DIGITAL SCREENING BILAT WITH TOMOSYNTHESIS_CAD: ICD-10-PCS | Mod: 26,,, | Performed by: RADIOLOGY

## 2020-03-09 PROCEDURE — 77063 MAMMO DIGITAL SCREENING BILAT WITH TOMOSYNTHESIS_CAD: ICD-10-PCS | Mod: 26,,, | Performed by: RADIOLOGY

## 2020-03-09 PROCEDURE — 77067 SCR MAMMO BI INCL CAD: CPT | Mod: TC,PO

## 2020-03-09 PROCEDURE — 77067 SCR MAMMO BI INCL CAD: CPT | Mod: 26,,, | Performed by: RADIOLOGY

## 2020-03-10 ENCOUNTER — TELEPHONE (OUTPATIENT)
Dept: FAMILY MEDICINE | Facility: CLINIC | Age: 60
End: 2020-03-10

## 2020-03-10 NOTE — TELEPHONE ENCOUNTER
----- Message from Anita Dao sent at 3/10/2020 10:15 AM CDT -----    Type:  Patient Returning Call    Who Called:  pt  Who Left Message for Patient:   nurse  Does the patient know what this is regarding?:    Poss test  results  Best Call Back Number:[phone]  Additional Information:    Calling  back

## 2020-04-23 ENCOUNTER — TELEPHONE (OUTPATIENT)
Dept: FAMILY MEDICINE | Facility: CLINIC | Age: 60
End: 2020-04-23

## 2020-05-21 ENCOUNTER — LAB VISIT (OUTPATIENT)
Dept: LAB | Facility: HOSPITAL | Age: 60
End: 2020-05-21
Attending: NURSE PRACTITIONER
Payer: MEDICARE

## 2020-05-21 DIAGNOSIS — E11.59 HYPERTENSION ASSOCIATED WITH DIABETES: ICD-10-CM

## 2020-05-21 DIAGNOSIS — I15.2 HYPERTENSION ASSOCIATED WITH DIABETES: ICD-10-CM

## 2020-05-21 DIAGNOSIS — Z79.4 TYPE 2 DIABETES MELLITUS WITH DIABETIC NEPHROPATHY, WITH LONG-TERM CURRENT USE OF INSULIN: ICD-10-CM

## 2020-05-21 DIAGNOSIS — E11.21 TYPE 2 DIABETES MELLITUS WITH DIABETIC NEPHROPATHY, WITH LONG-TERM CURRENT USE OF INSULIN: ICD-10-CM

## 2020-05-21 LAB
25(OH)D3+25(OH)D2 SERPL-MCNC: 33 NG/ML (ref 30–96)
ALBUMIN SERPL BCP-MCNC: 3.8 G/DL (ref 3.5–5.2)
ALP SERPL-CCNC: 94 U/L (ref 55–135)
ALT SERPL W/O P-5'-P-CCNC: 22 U/L (ref 10–44)
ANION GAP SERPL CALC-SCNC: 7 MMOL/L (ref 8–16)
ANION GAP SERPL CALC-SCNC: 7 MMOL/L (ref 8–16)
AST SERPL-CCNC: 20 U/L (ref 10–40)
BILIRUB SERPL-MCNC: 0.8 MG/DL (ref 0.1–1)
BUN SERPL-MCNC: 18 MG/DL (ref 8–23)
BUN SERPL-MCNC: 18 MG/DL (ref 8–23)
CALCIUM SERPL-MCNC: 9.5 MG/DL (ref 8.7–10.5)
CALCIUM SERPL-MCNC: 9.5 MG/DL (ref 8.7–10.5)
CHLORIDE SERPL-SCNC: 105 MMOL/L (ref 95–110)
CHLORIDE SERPL-SCNC: 105 MMOL/L (ref 95–110)
CHOLEST SERPL-MCNC: 193 MG/DL (ref 120–199)
CHOLEST/HDLC SERPL: 2.4 {RATIO} (ref 2–5)
CO2 SERPL-SCNC: 27 MMOL/L (ref 23–29)
CO2 SERPL-SCNC: 27 MMOL/L (ref 23–29)
CREAT SERPL-MCNC: 1 MG/DL (ref 0.5–1.4)
CREAT SERPL-MCNC: 1 MG/DL (ref 0.5–1.4)
EST. GFR  (AFRICAN AMERICAN): >60 ML/MIN/1.73 M^2
EST. GFR  (AFRICAN AMERICAN): >60 ML/MIN/1.73 M^2
EST. GFR  (NON AFRICAN AMERICAN): >60 ML/MIN/1.73 M^2
EST. GFR  (NON AFRICAN AMERICAN): >60 ML/MIN/1.73 M^2
ESTIMATED AVG GLUCOSE: 209 MG/DL (ref 68–131)
GLUCOSE SERPL-MCNC: 90 MG/DL (ref 70–110)
GLUCOSE SERPL-MCNC: 90 MG/DL (ref 70–110)
HBA1C MFR BLD HPLC: 8.9 % (ref 4–5.6)
HDLC SERPL-MCNC: 82 MG/DL (ref 40–75)
HDLC SERPL: 42.5 % (ref 20–50)
LDLC SERPL CALC-MCNC: 98.4 MG/DL (ref 63–159)
NONHDLC SERPL-MCNC: 111 MG/DL
POTASSIUM SERPL-SCNC: 4.5 MMOL/L (ref 3.5–5.1)
POTASSIUM SERPL-SCNC: 4.5 MMOL/L (ref 3.5–5.1)
PROT SERPL-MCNC: 8.3 G/DL (ref 6–8.4)
SODIUM SERPL-SCNC: 139 MMOL/L (ref 136–145)
SODIUM SERPL-SCNC: 139 MMOL/L (ref 136–145)
TRIGL SERPL-MCNC: 63 MG/DL (ref 30–150)
TSH SERPL DL<=0.005 MIU/L-ACNC: 1.53 UIU/ML (ref 0.4–4)

## 2020-05-21 PROCEDURE — 80053 COMPREHEN METABOLIC PANEL: CPT

## 2020-05-21 PROCEDURE — 84443 ASSAY THYROID STIM HORMONE: CPT

## 2020-05-21 PROCEDURE — 36415 COLL VENOUS BLD VENIPUNCTURE: CPT | Mod: PO

## 2020-05-21 PROCEDURE — 80061 LIPID PANEL: CPT

## 2020-05-21 PROCEDURE — 82306 VITAMIN D 25 HYDROXY: CPT

## 2020-05-21 PROCEDURE — 83036 HEMOGLOBIN GLYCOSYLATED A1C: CPT

## 2020-05-27 ENCOUNTER — PATIENT OUTREACH (OUTPATIENT)
Dept: ADMINISTRATIVE | Facility: OTHER | Age: 60
End: 2020-05-27

## 2020-05-27 NOTE — PROGRESS NOTES
CC: This 60 y.o. female presents for management of diabetes  and chronic conditions pending review including HTN, HLP, nephropathy     HPI: She was diagnosed with T2DM in 2014. Has never been hospitalized r/t DM.  Family hx of DM: mom, dad, brothers, and sister      Allegra in December  Continues to have right sided weakness and balance issues    hypoglycemia at home-none    monitoring BG at home: 4 times a day reports bg as below, no log today  Did not make insulin adjustments as recommended at last appt  Fasting   Lunch: 160s  Dinner: 170s  Bedtime 170s    Diet: Eats 3 meals a day, snacks-has really made an effort to stop snacking   Exercise: none  CURRENT DM MEDS: tresiba 32 u qhs, Novolog  6 u AC + correction, metformin 1000 mg bid  Vial/pen:  Uses pens,   Glucometer type:  True Metrix     Standards of Care:  Eye exam: 9/2019 + DM retinopathy, + cataracts       Follows w Dr Ruthann barclay nephrology     Retired from [employer]- [employer] dept (worked for them for 20 yrs)       ROS:   Gen: Appetite good, + weight gain, denies fatigue and weakness.  Skin: Skin is intact and heals well, no rashes, no hair changes  Eyes: +visual disturbances- wears glasses   Resp: no SOB or SEGUNDO, no cough  Cardiac: No palpitations, chest pain, no edema   GI: No nausea or vomiting, diarrhea, + constipation, or abdominal pain.  /GYN: 2-3+ nocturia, no burning or pain.   MS/Neuro: Denies numbness/ tingling in BLE; Gait un steady, speech clear  Psych: Denies drug/ETOH abuse, no hx of depression.  Other systems: negative.    PE:  GENERAL: Well developed, well nourished.  PSYCH: AAOx3, appropriate mood and affect, pleasant expression, conversant, appears relaxed, well groomed.   EYES: Conjunctiva, corneas clear  NECK: Supple, trachea midline  NEURO: Gait steady  SKIN: Skin warm and dry no acanthosis nigracans.  FOOT EXAMINATION: 11/2019  No foot deformity, corns or callus formation,  nails in good condition and well trimmed,  no interspace maceration or ulceration noted.  Decreased hair growth present over toes/feet.  Positive vibratory response to 128 Hz tuning fork bilaterally.  Protective sensation intact with 10 gram monofilament.  +2 dorsalis pedis and posterior pulses noted.      Lab Results   Component Value Date    MICALBCREAT 25.7 07/03/2019       Hemoglobin A1C   Date Value Ref Range Status   05/21/2020 8.9 (H) 4.0 - 5.6 % Final     Comment:     ADA Screening Guidelines:  5.7-6.4%  Consistent with prediabetes  >or=6.5%  Consistent with diabetes  High levels of fetal hemoglobin interfere with the HbA1C  assay. Heterozygous hemoglobin variants (HbS, HgC, etc)do  not significantly interfere with this assay.   However, presence of multiple variants may affect accuracy.     02/06/2020 10.1 (H) 4.0 - 5.6 % Final     Comment:     ADA Screening Guidelines:  5.7-6.4%  Consistent with prediabetes  >or=6.5%  Consistent with diabetes  High levels of fetal hemoglobin interfere with the HbA1C  assay. Heterozygous hemoglobin variants (HbS, HgC, etc)do  not significantly interfere with this assay.   However, presence of multiple variants may affect accuracy.     12/13/2019 11.0 (H) 4.5 - 6.2 % Final     Comment:     According to ADA guidelines, hemoglobin A1C <7.0% represents  optimal control in non-pregnant diabetic patients.  Different  metrics may apply to specific populations.   Standards of Medical Care in Diabetes - 2016.  For the purpose of screening for the presence of diabetes:  <5.7%     Consistent with the absence of diabetes  5.7-6.4%  Consistent with increasing risk for diabetes   (prediabetes)  >or=6.5%  Consistent with diabetes  Currently no consensus exists for use of hemoglobin A1C  for diagnosis of diabetes for children.          ASSESSMENT and PLAN:    1. T2DM with hyperglycemia, DM nephropathy, Dm retinopathy  CGMS today  Does not wish to have her own sensor- would rather stick her fingers  Continue [id number] u qhs,  Increase Novolog to 6-8-10 u Ac + correction  Check bg 4 times a day    Discussed A1c and BG goals.   - takes  Arb, statin    2. HTN - controlled, continue meds as previously prescribed and monitor. Has not taken her meds yet this am    3. HLP - on statin therapy, LFTs WNL    4. COPD- No current steroids    5.  Vitamin d deficiency-Continue ergocalciferol 34826 IU weekly    7. CVA- optimize bg    8. Balance issues- PT consulted    Follow-up: in 3 months with lab prior

## 2020-05-28 ENCOUNTER — OFFICE VISIT (OUTPATIENT)
Dept: ENDOCRINOLOGY | Facility: CLINIC | Age: 60
End: 2020-05-28
Payer: MEDICARE

## 2020-05-28 ENCOUNTER — CLINICAL SUPPORT (OUTPATIENT)
Dept: ENDOCRINOLOGY | Facility: CLINIC | Age: 60
End: 2020-05-28
Payer: MEDICARE

## 2020-05-28 VITALS
HEIGHT: 59 IN | SYSTOLIC BLOOD PRESSURE: 128 MMHG | HEART RATE: 66 BPM | DIASTOLIC BLOOD PRESSURE: 84 MMHG | TEMPERATURE: 97 F | BODY MASS INDEX: 26.91 KG/M2 | WEIGHT: 133.5 LBS

## 2020-05-28 DIAGNOSIS — E11.65 TYPE 2 DIABETES MELLITUS WITH HYPERGLYCEMIA, WITH LONG-TERM CURRENT USE OF INSULIN: Primary | ICD-10-CM

## 2020-05-28 DIAGNOSIS — Z79.4 TYPE 2 DIABETES MELLITUS WITH BOTH EYES AFFECTED BY MODERATE NONPROLIFERATIVE RETINOPATHY WITHOUT MACULAR EDEMA, WITH LONG-TERM CURRENT USE OF INSULIN: ICD-10-CM

## 2020-05-28 DIAGNOSIS — Z79.4 TYPE 2 DIABETES MELLITUS WITH HYPERGLYCEMIA, WITH LONG-TERM CURRENT USE OF INSULIN: ICD-10-CM

## 2020-05-28 DIAGNOSIS — I63.9 CEREBROVASCULAR ACCIDENT (CVA) OF LEFT PONTINE STRUCTURE: ICD-10-CM

## 2020-05-28 DIAGNOSIS — E78.2 MIXED HYPERLIPIDEMIA: ICD-10-CM

## 2020-05-28 DIAGNOSIS — E11.21 TYPE 2 DIABETES MELLITUS WITH DIABETIC NEPHROPATHY, WITH LONG-TERM CURRENT USE OF INSULIN: ICD-10-CM

## 2020-05-28 DIAGNOSIS — Z79.4 TYPE 2 DIABETES MELLITUS WITH DIABETIC NEPHROPATHY, WITH LONG-TERM CURRENT USE OF INSULIN: ICD-10-CM

## 2020-05-28 DIAGNOSIS — E11.3393 TYPE 2 DIABETES MELLITUS WITH BOTH EYES AFFECTED BY MODERATE NONPROLIFERATIVE RETINOPATHY WITHOUT MACULAR EDEMA, WITH LONG-TERM CURRENT USE OF INSULIN: ICD-10-CM

## 2020-05-28 DIAGNOSIS — Z79.4 TYPE 2 DIABETES MELLITUS WITH HYPERGLYCEMIA, WITH LONG-TERM CURRENT USE OF INSULIN: Primary | ICD-10-CM

## 2020-05-28 DIAGNOSIS — E11.65 TYPE 2 DIABETES MELLITUS WITH HYPERGLYCEMIA, WITH LONG-TERM CURRENT USE OF INSULIN: ICD-10-CM

## 2020-05-28 DIAGNOSIS — I15.2 HYPERTENSION ASSOCIATED WITH DIABETES: ICD-10-CM

## 2020-05-28 DIAGNOSIS — E55.9 VITAMIN D DEFICIENCY, UNSPECIFIED: ICD-10-CM

## 2020-05-28 DIAGNOSIS — E11.59 HYPERTENSION ASSOCIATED WITH DIABETES: ICD-10-CM

## 2020-05-28 PROCEDURE — 99999 PR PBB SHADOW E&M-EST. PATIENT-LVL IV: ICD-10-PCS | Mod: PBBFAC,,, | Performed by: NURSE PRACTITIONER

## 2020-05-28 PROCEDURE — 3052F HG A1C>EQUAL 8.0%<EQUAL 9.0%: CPT | Mod: CPTII,S$GLB,, | Performed by: NURSE PRACTITIONER

## 2020-05-28 PROCEDURE — 99999 PR PBB SHADOW E&M-EST. PATIENT-LVL IV: CPT | Mod: PBBFAC,,, | Performed by: NURSE PRACTITIONER

## 2020-05-28 PROCEDURE — 95250 CONT GLUC MNTR PHYS/QHP EQP: CPT | Mod: S$GLB,,, | Performed by: NURSE PRACTITIONER

## 2020-05-28 PROCEDURE — 3052F PR MOST RECENT HEMOGLOBIN A1C LEVEL 8.0 - < 9.0%: ICD-10-PCS | Mod: CPTII,S$GLB,, | Performed by: NURSE PRACTITIONER

## 2020-05-28 PROCEDURE — 99214 PR OFFICE/OUTPT VISIT, EST, LEVL IV, 30-39 MIN: ICD-10-PCS | Mod: S$GLB,,, | Performed by: NURSE PRACTITIONER

## 2020-05-28 PROCEDURE — 99214 OFFICE O/P EST MOD 30 MIN: CPT | Mod: S$GLB,,, | Performed by: NURSE PRACTITIONER

## 2020-05-28 PROCEDURE — 95250 PR GLUCOSE MONITORING,72 HRS,SUB-Q SENSOR: ICD-10-PCS | Mod: S$GLB,,, | Performed by: NURSE PRACTITIONER

## 2020-05-28 RX ORDER — PEN NEEDLE, DIABETIC 30 GX3/16"
NEEDLE, DISPOSABLE MISCELLANEOUS
Qty: 400 EACH | Refills: 4 | Status: SHIPPED | OUTPATIENT
Start: 2020-05-28 | End: 2021-06-02 | Stop reason: SDUPTHER

## 2020-05-28 RX ORDER — INSULIN DEGLUDEC 100 U/ML
32 INJECTION, SOLUTION SUBCUTANEOUS NIGHTLY
Qty: 15 ML | Refills: 12 | Status: SHIPPED | OUTPATIENT
Start: 2020-05-28 | End: 2021-05-28

## 2020-05-28 NOTE — PROGRESS NOTES
The CGM placed on arm successful.  Instructions given to patient to record food log . Patient advised to call if area become red, sore, tender to touch or if sensor falls off.. Verbalized understanding.

## 2020-06-05 ENCOUNTER — CLINICAL SUPPORT (OUTPATIENT)
Dept: REHABILITATION | Facility: HOSPITAL | Age: 60
End: 2020-06-05
Attending: NURSE PRACTITIONER
Payer: MEDICARE

## 2020-06-05 DIAGNOSIS — I63.9 CEREBROVASCULAR ACCIDENT (CVA) OF LEFT PONTINE STRUCTURE: ICD-10-CM

## 2020-06-05 PROCEDURE — 97161 PT EVAL LOW COMPLEX 20 MIN: CPT | Mod: PN

## 2020-06-05 NOTE — PLAN OF CARE
"[facility]  Physical Therapy Initial Evaluation    Name: Steff Johnson  Clinic Number: [id number]    Therapy Diagnosis:   Encounter Diagnosis   Name Primary?    Cerebrovascular accident (CVA) of left pontine structure      Physician: Cassidy Shaw DNP, *    Physician Orders: PT Eval and Treat   Medical Diagnosis from Referral: I63.50 (ICD-10-CM) - Cerebrovascular accident (CVA) of left pontine structure  Evaluation Date: 6/5/2020  Authorization Period Expiration: 06/19/2020  Plan of Care Expiration: 07/30/2020  Visit # / Visits authorized: 1/ 6     Time In: 1240  Time Out: 1324  Total Billable Time: 44 minutes    Precautions: Falls, DM, HTN, COPD    Subjective   Date of onset: 12/15/2019  History of current condition - Steff reports: CVA in December 2019. Symptoms included double vision, imbalance, and R sided weakness. She reports her friend drove her to [facility] where she stayed for 3 days before getting discharged home. Reports she received one  PT visit and was told she would continue getting PT, but therapist did not visit again. Reports she currently tries to get her exercise by walking everyday with her granddaughter using her rollator in the neighborhood ~ 1 mile.  Reports she gets fatigued and has "tightness," in R thigh and has to sit down or she feels like she might collapse. She is also having R shoulder pain. Pt states fingers of both hands feel numb and cold. She also reports numbness in her R lower leg.    Medical History:   Past Medical History:   Diagnosis Date    Arthritis     COPD (chronic obstructive pulmonary disease)     Diabetes mellitus     Hypertension     Wears glasses        Surgical History:   Steff Johnson  has a past surgical history that includes Shoulder surgery; Hysterectomy; Colonoscopy (N/A, 4/11/2017); and Oophorectomy.    Past Surgical History:   Procedure Laterality Date    COLONOSCOPY N/A 4/11/2017    Procedure: COLONOSCOPY;  " Surgeon: Jared Antonio MD;  Location: [facility];  Service: Endoscopy;  Laterality: N/A;    HYSTERECTOMY      OOPHORECTOMY      SHOULDER SURGERY      R       Medications:   Steff has a current medication list which includes the following prescription(s): amlodipine-valsartan, aspirin, atorvastatin, chlorthalidone, clobetasol 0.05%, clonidine 0.3 mg/24 hr td ptwk, clopidogrel, fluocinonide, hydralazine, hydrocodone-acetaminophen, insulin aspart u-100, insulin degludec, lancets, metoprolol succinate, pen needle, diabetic, spironolactone, and umeclidinium.    Allergies:   Review of patient's allergies indicates:  No Known Allergies     Imaging:     MRI brain 12/12/2019  IMPRESSION:  1. Tiny pontine infarct seen.  2. Mild atrophy and moderate chronic ischemic changes    CT Head 12/12/2019:  Impression       1. No acute intracranial process.    2. Chronic/involutional findings as noted above.     Prior Therapy: no  Social History: lives with 2 granddaughters in 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 At home, she is active with some household tasks and leaves others to her granddaughters; reports many times she just wants to lie down and rest/watch TV. Hobbies include crocheting, but has been unable to do this since her stroke due to problems with her hands/fingers.    Prior Level of Function: Independent prior to CVA 12/12/19  Current Level of Function: Decreased functional mobility including walking, squatting  DME: rollator, [facility] (reports she keeps in the car, occasionally uses for walking with granddaughter)     Pain:    Location: R hip/thigh, R lateral lower leg  Description: intermittent, aggravating, dull   Aggravating Factors: prolonged sitting  Easing Factors: getting up and moving around, walking  Current 3-8/10, worst 10/10, best 3/10     Pts goals: Improve walking, balance, use of hands    Objective     Posture/Appearance: Fwd head position, rounded shoulders,   Sensation: Reports numbness/tingling  in fingers of both hands, lateral R thigh/lower leg    ROM/Strength:     Lower Extremity Range of Motion:  Right Lower Extremity: WFL  Left Lower Extremity: WFL    Lower Extremity Strength:  Right LE  Left LE    Hip flexion: 3+/5 Hip flexion: 4+/5   Knee extension: 4/5 Knee extension: 4+/5   Knee flexion: 3+/5 Knee flexion: 4+/5   Ankle dorsiflexion:  4/5 Ankle dorsiflexion: 4+/5       Flexibility: ROM as per above  Transfers: Able to perform sit to stand with 0 UE support   Gait: Ambulates with no AD, pathway deviation  Balance: Boyce balance scale: 37/56=medium fall risk      Functional Outcome Measure: Lower Extremity Functional Scale (LEFS): 59/80=26.25% impairment    TREATMENT       Home Exercises and Patient Education Provided    Education provided:   - Role/goal PT; POC      Written Home Exercises Provided: no    Assessment   Steff is a 60 y.o. female referred to outpatient Physical Therapy with a medical diagnosis of I63.50 (ICD-10-CM) - Cerebrovascular accident (CVA) of left pontine structure. Pt presents with weakness, decreased balance, and impaired functional mobility.    Pt prognosis is Good.   Pt will benefit from skilled outpatient Physical Therapy to address the deficits stated above and in the chart below, provide pt/family education, and to maximize pt's level of independence.     Plan of care discussed with patient: Yes  Pt's spiritual, cultural and educational needs considered and patient is agreeable to the plan of care and goals as stated below:     Anticipated Barriers for therapy: none    Medical Necessity is demonstrated by the following  History  Co-morbidities and personal factors that may impact the plan of care Co-morbidities:   diabetes, history of CVA and HTN  Personal Factors:   no deficits     low   Examination  Body Structures and Functions, activity limitations and participation restrictions that may impact the plan of care Body Regions:   lower extremities  Body Systems:     ROM  strength  balance  gait  Participation Restrictions:   Activity limitations:   Learning and applying knowledge  no deficits  General Tasks and Commands  no deficits  Communication  no deficits  Mobility  walking  Self care    Domestic Life  cooking  doing house work (cleaning house, washing dishes, laundry)  Interactions/Relationships  no deficits  Life Areas    Community and Social Life  no deficits       moderate   Clinical Presentation stable and uncomplicated low   Decision Making/ Complexity Score: low     Goals:  Short Term Goals: 3 weeks   1) Patient will initiate HEP  2) Patient will improve strength 1/2 muscle grade    Long Term Goals: 6 weeks   1) Patient will be independent in HEP  2) Patient will improve BAEZA scale   3) Patient will improve functional outcome measure LEFS to <20% impairment    Plan   Plan of care Certification: 6/5/2020 to 07/30/2020.    Outpatient Physical Therapy 2 times weekly for 6 weeks to include the following interventions: Gait Training, Manual Therapy, Moist Heat/ Ice, Neuromuscular Re-ed, Patient Education, Therapeutic Activites and Therapeutic Exercise.     Teetee Bell, PT

## 2020-06-09 ENCOUNTER — PATIENT OUTREACH (OUTPATIENT)
Dept: ADMINISTRATIVE | Facility: OTHER | Age: 60
End: 2020-06-09

## 2020-06-09 ENCOUNTER — CLINICAL SUPPORT (OUTPATIENT)
Dept: REHABILITATION | Facility: HOSPITAL | Age: 60
End: 2020-06-09
Attending: NURSE PRACTITIONER
Payer: MEDICARE

## 2020-06-09 ENCOUNTER — DOCUMENTATION ONLY (OUTPATIENT)
Dept: REHABILITATION | Facility: HOSPITAL | Age: 60
End: 2020-06-09

## 2020-06-09 DIAGNOSIS — I63.9 CEREBROVASCULAR ACCIDENT (CVA) OF LEFT PONTINE STRUCTURE: Primary | ICD-10-CM

## 2020-06-09 PROCEDURE — 97110 THERAPEUTIC EXERCISES: CPT | Mod: PN,CQ

## 2020-06-09 PROCEDURE — 97140 MANUAL THERAPY 1/> REGIONS: CPT | Mod: PN,CQ

## 2020-06-09 NOTE — PROGRESS NOTES
"  Outpatient Therapy Speech Language Pathology Screener   Name: Steff Johnson  Clinic Number: [id number]  Screening date: 6/9/2020    Assessment    A screening of Steff Johnson's speech, language, cognition, voice, and swallow function was conducted following verbal consent from the patient. The pt listed the following concerns: "I can sometimes say what I was to, and sometimes I can't". The results of the screener are below.    Memory: no problems were reported  Auditory Comprehension: WFL for the context of this screener  Motor Speech: Requires further evaluation  Fluency: WFL for the context of this screener  Voice: WFL for the context of this screener  Pragmatics: WFL for the context of this screener  Swallowing: no problems were reported    Plan   A full evaluation by a SLP is warrented at this time. Pt has requested to be evaluated by speech therapy. PT to request the orders.    Madison Carpio M.A. CF-SLP  Speech Language Pathologist  6/9/2020           "

## 2020-06-09 NOTE — PROGRESS NOTES
"  Physical Therapy Treatment Note     Name: Steff Johnson  Clinic Number: [id number]    Therapy Diagnosis:   Encounter Diagnosis   Name Primary?    Cerebrovascular accident (CVA) of left pontine structure Yes     Physician: Cassidy Shaw, DNP, *    Visit Date: 6/9/2020    Physician Orders: PT Eval and Treat   Medical Diagnosis from Referral: I63.50 (ICD-10-CM) - Cerebrovascular accident (CVA) of left pontine structure  Evaluation Date: 6/5/2020  Authorization Period Expiration: 06/19/2020  Plan of Care Expiration: 07/30/2020  Visit # / Visits authorized: 1/ 6       Time In: 1030  Time Out: 1115  Total Billable Time: 45 minutes    Precautions: Diabetes, Fall and HTN, COPD    Subjective     Pt reports: Patient reports R lateral thigh pain.  She had not received home exercise program.  Response to previous treatment: no problem  Functional change: none stated    Pain: 8/10  Location: right lateral thigh      Objective     Steff received therapeutic exercises to develop strength, endurance and flexibility for 37 minutes including:    NuStep Lv2 10' with UE's  Sit ex 1# 2x10: LAQ, march, ball squeeze, hip abd Red Tband  Calf stretch 3x30" 1/2 roll B  HR/TR x20      Steff received the following manual therapy techniques: Soft tissue Mobilization were applied to the: R IT Band, hamstring, glut and lateral quads for 10 minutes, including:  Use of theraroller.    Home Exercises Provided and Patient Education Provided     Education provided:   - Rest break between each rep    Written Home Exercises Provided: yes.  Exercises were reviewed and Steff was able to demonstrate them prior to the end of the session.  Steff demonstrated good  understanding of the education provided.     See EMR under Media for exercises provided 6/9/2020.    Assessment     Patient tolerated activities with cues for direction and proper form.  Steff is progressing well towards her goals.   Pt prognosis is Good.     Pt will continue to " benefit from skilled outpatient physical therapy to address the deficits listed in the problem list box on initial evaluation, provide pt/family education and to maximize pt's level of independence in the home and community environment.     Pt's spiritual, cultural and educational needs considered and pt agreeable to plan of care and goals.     Anticipated barriers to physical therapy: none    Goals:     Short Term Goals: 3 weeks   1) Patient will initiate HEP  2) Patient will improve strength 1/2 muscle grade     Long Term Goals: 6 weeks   1) Patient will be independent in HEP  2) Patient will improve BAEZA scale   3) Patient will improve functional outcome measure LEFS to <20% impairment      Plan     Continue with POC to increase activity endurance as patient tolerates.    Jonelle Bentley, PTA

## 2020-06-11 ENCOUNTER — OFFICE VISIT (OUTPATIENT)
Dept: ENDOCRINOLOGY | Facility: CLINIC | Age: 60
End: 2020-06-11
Payer: MEDICARE

## 2020-06-11 VITALS
HEART RATE: 80 BPM | HEIGHT: 59 IN | SYSTOLIC BLOOD PRESSURE: 130 MMHG | BODY MASS INDEX: 27.23 KG/M2 | TEMPERATURE: 98 F | DIASTOLIC BLOOD PRESSURE: 84 MMHG | WEIGHT: 135.06 LBS

## 2020-06-11 DIAGNOSIS — E11.3393 TYPE 2 DIABETES MELLITUS WITH BOTH EYES AFFECTED BY MODERATE NONPROLIFERATIVE RETINOPATHY WITHOUT MACULAR EDEMA, WITH LONG-TERM CURRENT USE OF INSULIN: ICD-10-CM

## 2020-06-11 DIAGNOSIS — E11.21 TYPE 2 DIABETES MELLITUS WITH DIABETIC NEPHROPATHY, WITH LONG-TERM CURRENT USE OF INSULIN: ICD-10-CM

## 2020-06-11 DIAGNOSIS — E78.2 MIXED HYPERLIPIDEMIA: ICD-10-CM

## 2020-06-11 DIAGNOSIS — E11.65 TYPE 2 DIABETES MELLITUS WITH HYPERGLYCEMIA, WITH LONG-TERM CURRENT USE OF INSULIN: Primary | ICD-10-CM

## 2020-06-11 DIAGNOSIS — I15.2 HYPERTENSION ASSOCIATED WITH DIABETES: ICD-10-CM

## 2020-06-11 DIAGNOSIS — Z79.4 TYPE 2 DIABETES MELLITUS WITH HYPERGLYCEMIA, WITH LONG-TERM CURRENT USE OF INSULIN: Primary | ICD-10-CM

## 2020-06-11 DIAGNOSIS — E11.59 HYPERTENSION ASSOCIATED WITH DIABETES: ICD-10-CM

## 2020-06-11 DIAGNOSIS — Z79.4 TYPE 2 DIABETES MELLITUS WITH DIABETIC NEPHROPATHY, WITH LONG-TERM CURRENT USE OF INSULIN: ICD-10-CM

## 2020-06-11 DIAGNOSIS — Z79.4 TYPE 2 DIABETES MELLITUS WITH BOTH EYES AFFECTED BY MODERATE NONPROLIFERATIVE RETINOPATHY WITHOUT MACULAR EDEMA, WITH LONG-TERM CURRENT USE OF INSULIN: ICD-10-CM

## 2020-06-11 PROCEDURE — 99999 PR PBB SHADOW E&M-EST. PATIENT-LVL IV: ICD-10-PCS | Mod: PBBFAC,,, | Performed by: NURSE PRACTITIONER

## 2020-06-11 PROCEDURE — 3052F HG A1C>EQUAL 8.0%<EQUAL 9.0%: CPT | Mod: CPTII,S$GLB,, | Performed by: NURSE PRACTITIONER

## 2020-06-11 PROCEDURE — 3052F PR MOST RECENT HEMOGLOBIN A1C LEVEL 8.0 - < 9.0%: ICD-10-PCS | Mod: CPTII,S$GLB,, | Performed by: NURSE PRACTITIONER

## 2020-06-11 PROCEDURE — 99213 OFFICE O/P EST LOW 20 MIN: CPT | Mod: S$GLB,,, | Performed by: NURSE PRACTITIONER

## 2020-06-11 PROCEDURE — 99999 PR PBB SHADOW E&M-EST. PATIENT-LVL IV: CPT | Mod: PBBFAC,,, | Performed by: NURSE PRACTITIONER

## 2020-06-11 PROCEDURE — 95251 PR GLUCOSE MONITOR, 72 HOUR, PHYS INTERP: ICD-10-PCS | Mod: S$GLB,,, | Performed by: NURSE PRACTITIONER

## 2020-06-11 PROCEDURE — 99213 PR OFFICE/OUTPT VISIT, EST, LEVL III, 20-29 MIN: ICD-10-PCS | Mod: S$GLB,,, | Performed by: NURSE PRACTITIONER

## 2020-06-11 PROCEDURE — 95251 CONT GLUC MNTR ANALYSIS I&R: CPT | Mod: S$GLB,,, | Performed by: NURSE PRACTITIONER

## 2020-06-11 RX ORDER — INSULIN ASPART 100 [IU]/ML
INJECTION, SOLUTION INTRAVENOUS; SUBCUTANEOUS
Qty: 15 ML | Refills: 12 | Status: SHIPPED | OUTPATIENT
Start: 2020-06-11 | End: 2020-09-02

## 2020-06-11 NOTE — PROGRESS NOTES
CC: This 60 y.o. female presents for management of diabetes  and chronic conditions pending review including HTN, HLP, nephropathy     HPI: She was diagnosed with T2DM in 2014. Has never been hospitalized r/t DM.  Family hx of DM: mom, dad, brothers, and sister      Arrives for CGMS review  Her meter is NOT correlating with Connor at all,   See CGMS download in Media tab  Interpretation:   BG are gross lly elevated, having large pp excursions, no hypoglycemia  She denies and missed doses of insulin  Time in range 33%  Otherwise all her bg readings are > 180     Started PT since her last visit- also c/o hand numbness, feels tight, no edema  Stroke in December- Continues to have right sided weakness and balance issues    Checking bg 4 times a day        Diet: Eats 3 meals a day, snacks-after dinner on PB crackers and milk  Exercise: In PT  CURRENT DM MEDS: tresiba 32 u qhs, Novolog  6-8-10 u AC + correction, metformin 1000 mg bid  Vial/pen:  Uses pens,   Glucometer type:  True Metrix     Standards of Care:  Eye exam: 9/2019 + DM retinopathy, + cataracts       Follows w Dr Ruthann barclay nephrology     Retired from [employer]- [employer] dept (worked for them for 20 yrs)       ROS:   Gen: Appetite good,  weight stable, denies fatigue and weakness.  Skin: Skin is intact and heals well, no rashes, no hair changes  Eyes: +visual disturbances- wears glasses   Resp: no SOB or SEGUNDO, no cough  Cardiac: No palpitations, chest pain, no edema   GI: No nausea or vomiting, diarrhea, + constipation, or abdominal pain.  /GYN: 2-3+ nocturia, no burning or pain.   MS/Neuro: Denies numbness/ tingling in BLE; Gait unsteady, speech clear  Psych: Denies drug/ETOH abuse, no hx of depression.  Other systems: negative.    PE:  GENERAL: Well developed, well nourished.  PSYCH: AAOx3, appropriate mood and affect, pleasant expression, conversant, appears relaxed, well groomed.   EYES: Conjunctiva, corneas clear  NECK: Supple, trachea  midline  NEURO: Gait steady  SKIN: Skin warm and dry no acanthosis nigracans.  FOOT EXAMINATION: 11/2019  No foot deformity, corns or callus formation,  nails in good condition and well trimmed, no interspace maceration or ulceration noted.  Decreased hair growth present over toes/feet.  Positive vibratory response to 128 Hz tuning fork bilaterally.  Protective sensation intact with 10 gram monofilament.  +2 dorsalis pedis and posterior pulses noted.      Lab Results   Component Value Date    MICALBCREAT 25.7 07/03/2019       Hemoglobin A1C   Date Value Ref Range Status   05/21/2020 8.9 (H) 4.0 - 5.6 % Final     Comment:     ADA Screening Guidelines:  5.7-6.4%  Consistent with prediabetes  >or=6.5%  Consistent with diabetes  High levels of fetal hemoglobin interfere with the HbA1C  assay. Heterozygous hemoglobin variants (HbS, HgC, etc)do  not significantly interfere with this assay.   However, presence of multiple variants may affect accuracy.     02/06/2020 10.1 (H) 4.0 - 5.6 % Final     Comment:     ADA Screening Guidelines:  5.7-6.4%  Consistent with prediabetes  >or=6.5%  Consistent with diabetes  High levels of fetal hemoglobin interfere with the HbA1C  assay. Heterozygous hemoglobin variants (HbS, HgC, etc)do  not significantly interfere with this assay.   However, presence of multiple variants may affect accuracy.     12/13/2019 11.0 (H) 4.5 - 6.2 % Final     Comment:     According to ADA guidelines, hemoglobin A1C <7.0% represents  optimal control in non-pregnant diabetic patients.  Different  metrics may apply to specific populations.   Standards of Medical Care in Diabetes - 2016.  For the purpose of screening for the presence of diabetes:  <5.7%     Consistent with the absence of diabetes  5.7-6.4%  Consistent with increasing risk for diabetes   (prediabetes)  >or=6.5%  Consistent with diabetes  Currently no consensus exists for use of hemoglobin A1C  for diagnosis of diabetes for children.           ASSESSMENT and PLAN:    1. T2DM with hyperglycemia, DM nephropathy, Dm retinopathy  Needs new meter ASAP, her home readings are way off, gave her sample Guide Me w 10 Strips until she can receive new working meter from [employer]  Does not wish to have her own sensor- would rather stick her fingers  Continue [id number] u qhs, Increase Novolog to 8-10-12 u Ac + correction  Take 2 units w bedtime snack- no ssi w this dose  Check bg 4 times a day    Discussed A1c and BG goals.   - takes  Arb, statin    2. HTN - controlled, continue meds as previously prescribed and monitor. Has not taken her meds yet this am    3. HLP - on statin therapy, LFTs WNL    4. COPD- No current steroids    5.  Vitamin d deficiency-Continue ergocalciferol 80856 IU weekly    7. CVA- optimize bg    8. Balance issues- PT following    Follow-up: in 3 months with lab prior

## 2020-06-12 ENCOUNTER — CLINICAL SUPPORT (OUTPATIENT)
Dept: REHABILITATION | Facility: HOSPITAL | Age: 60
End: 2020-06-12
Attending: NURSE PRACTITIONER
Payer: MEDICARE

## 2020-06-12 DIAGNOSIS — R26.9 GAIT ABNORMALITY: ICD-10-CM

## 2020-06-12 PROCEDURE — 97110 THERAPEUTIC EXERCISES: CPT | Mod: PN

## 2020-06-12 PROCEDURE — 97140 MANUAL THERAPY 1/> REGIONS: CPT | Mod: PN

## 2020-06-12 NOTE — PROGRESS NOTES
Physical Therapy Daily Treatment Note     Time In: 0849  Time Out: 0930  Total Billable Time: 41 minutes    Name: Steff Johnson  Clinic Number: [id number]    Therapy Diagnosis:   Encounter Diagnosis   Name Primary?    Gait abnormality      Physician: Cassidy Shaw DNP, *    Visit Date: 6/12/2020    Physician Orders: PT Eval and Treat   Medical Diagnosis from Referral: I63.50 (ICD-10-CM) - Cerebrovascular accident (CVA) of left pontine structure  Evaluation Date: 6/5/2020  Authorization Period Expiration: 06/19/2020  Plan of Care Expiration: 07/30/2020  Visit # / Visits authorized: 3/ 6        Precautions: Falls, DM, HTN, COPD       Subjective     Pt reports: she felt a little less R hip pain following last treatment session..  Response to previous treatment: decreased pain  Functional change: no    Pain: 7/10  Location: lateral R hip    Objective     Steff received therapeutic exercises to develop strength, endurance, ROM and flexibility for 33 minutes including:    Nustep L2 x 10 min LE's/UE's    Seated exercises: x 20   Hip adduction ball squeezes   Hip abduction clamshells redTB   LAQ 1#   Hip marches 1#    Standing gastroc stretch using 1/2 foam roll 3/30 sec B  HR/TR x 20    MT: STM using therabar to R lateral hip/ITB, glute x 8 min     Education provided:     - Continue with current home exercise program as instructed  - Discussed monitoring symptoms and stopping activities which cause increased pain    Written Home Exercises Provided: no.    Assessment     Good tolerance to treatment.   Steff is progressing well towards her goals.   Pt prognosis is Good.     Pt will continue to benefit from skilled outpatient physical therapy to address the deficits listed in the problem list box on initial evaluation, provide pt/family education and to maximize pt's level of independence in the home and community environment.     Pt's spiritual, cultural and educational needs considered and pt agreeable to plan of  care and goals.     Anticipated barriers to physical therapy: pain    Goals:  Short Term Goals: 3 weeks   1) Patient will initiate HEP  2) Patient will improve strength 1/2 muscle grade     Long Term Goals: 6 weeks   1) Patient will be independent in HEP  2) Patient will improve BAEZA scale   3) Patient will improve functional outcome measure LEFS to <20% impairment    Short Term Goal Status:  1) Not met  2) Not met     Long Term Goal Status:  1) Not met  2) Not assessed this date  3) Not assessed this date        Plan     Continue with established Plan of Care towards PT goals. Issue written/pictorial HEP next visit.

## 2020-06-18 ENCOUNTER — TELEPHONE (OUTPATIENT)
Dept: ENDOCRINOLOGY | Facility: CLINIC | Age: 60
End: 2020-06-18

## 2020-06-18 DIAGNOSIS — I63.9 CEREBROVASCULAR ACCIDENT (CVA) OF LEFT PONTINE STRUCTURE: Primary | ICD-10-CM

## 2020-06-19 ENCOUNTER — CLINICAL SUPPORT (OUTPATIENT)
Dept: REHABILITATION | Facility: HOSPITAL | Age: 60
End: 2020-06-19
Attending: NURSE PRACTITIONER
Payer: MEDICARE

## 2020-06-19 DIAGNOSIS — R26.9 GAIT ABNORMALITY: ICD-10-CM

## 2020-06-19 DIAGNOSIS — I63.9 CEREBROVASCULAR ACCIDENT (CVA) OF LEFT PONTINE STRUCTURE: ICD-10-CM

## 2020-06-19 PROCEDURE — 97110 THERAPEUTIC EXERCISES: CPT | Mod: PN

## 2020-06-19 PROCEDURE — 97140 MANUAL THERAPY 1/> REGIONS: CPT | Mod: PN

## 2020-06-19 NOTE — PROGRESS NOTES
Physical Therapy Daily Treatment Note     Time In: 0900  Time Out: 0938  Total Billable Time: 38 minutes    Name: Steff Johnson  Clinic Number: [id number]    Therapy Diagnosis:   Encounter Diagnoses   Name Primary?    Cerebrovascular accident (CVA) of left pontine structure     Gait abnormality      Physician: Cassidy Shaw, DNP, *    Visit Date: 6/19/2020       Physician Orders: PT Eval and Treat   Medical Diagnosis from Referral: I63.50 (ICD-10-CM) - Cerebrovascular accident (CVA) of left pontine structure  Evaluation Date: 6/5/2020  Authorization Period Expiration: 06/19/2020  Plan of Care Expiration: 07/30/2020  Visit # / Visits authorized: 4/ 6         Precautions: Falls, DM, HTN, COPD  Subjective     Pt reports: continues with R lateral hip pain with prolonged sitting, improves with standing/walking. Will be evaluated by ST the first week of July.  Response to previous treatment: no complaints  Functional change: no    Pain: 7.5-8/10  Location: R lateral hip    Objective     Steff received therapeutic exercises to develop strength, endurance, ROM and flexibility for 30 minutes including:      Nustep L4 x 10 min UE's/LE's  Standing gastroc stretch using 1/2 foam roll 3/30 sec B  Airex HR/TR x 30  Minisquats on level tile x 15  LAQ 1# x 20 L/R  Seated hip marches 1# x 20 L/R    MT: STM using therabar to R lateral/posterior hip/ITB x 8 min    Education provided:     - Discussed monitoring symptoms and stopping activities which cause increased pain      Assessment     Good tolerance of exercises, mod cueing needed for minisquats.  Steff is progressing well towards her goals.   Pt prognosis is Good.     Pt will continue to benefit from skilled outpatient physical therapy to address the deficits listed in the problem list box on initial evaluation, provide pt/family education and to maximize pt's level of independence in the home and community environment.     Pt's spiritual, cultural and educational needs  considered and pt agreeable to plan of care and goals.     Anticipated barriers to physical therapy: pain    Goals:  Short Term Goals: 3 weeks   1) Patient will initiate HEP  2) Patient will improve strength 1/2 muscle grade     Long Term Goals: 6 weeks   1) Patient will be independent in HEP  2) Patient will improve BAEZA scale   3) Patient will improve functional outcome measure LEFS to <20% impairment     Short Term Goal Status:  1) Not met  2) Not met      Long Term Goal Status:  1) Not met  2) Not assessed this date  3) Not assessed this date         Plan     Continue with established Plan of Care towards PT goals. Progression of activities as per pt tolerance. Distribution of written/pictorial HEP next visit.

## 2020-06-24 ENCOUNTER — CLINICAL SUPPORT (OUTPATIENT)
Dept: REHABILITATION | Facility: HOSPITAL | Age: 60
End: 2020-06-24
Attending: NURSE PRACTITIONER
Payer: MEDICARE

## 2020-06-24 DIAGNOSIS — R26.9 GAIT ABNORMALITY: ICD-10-CM

## 2020-06-24 PROCEDURE — 97110 THERAPEUTIC EXERCISES: CPT | Mod: PN

## 2020-06-24 PROCEDURE — 97140 MANUAL THERAPY 1/> REGIONS: CPT | Mod: PN

## 2020-06-24 NOTE — PROGRESS NOTES
Physical Therapy Daily Treatment Note     Time In: 0925  Time Out: 1005  Total Billable Time: 40 minutes    Name: Steff Johnson  Clinic Number: [id number]    Therapy Diagnosis:   Encounter Diagnosis   Name Primary?    Gait abnormality      Physician: Cassidy Shaw DNP, *    Visit Date: 6/24/2020    Physician Orders: PT Eval and Treat   Medical Diagnosis from Referral: I63.50 (ICD-10-CM) - Cerebrovascular accident (CVA) of left pontine structure  Evaluation Date: 6/5/2020  Authorization Period Expiration: 07/19/2020  Plan of Care Expiration: 07/30/2020  Visit # / Visits authorized: 1/ 6         Precautions: Falls, DM, HTN, COPD    Subjective     Pt reports: imbalance to R side as well as continued pain R lateral hip/thigh.  Response to previous treatment: no complaints  Functional change: no    Pain: 8/10  Location: R lateral hip/thigh    Objective     Steff received therapeutic exercises to develop strength, endurance, ROM and flexibility for 32 minutes including:        Nustep L4 x 10 min UE's/LE's  Standing gastroc stretch using 1/2 foam roll 3/30 sec B  Airex HR/TR x 20  Airex SLS 3/15 sec L/R with light BUE support  SAQ x 20 L/R  SLR x 10 L/R  Hip adduction ball squeezes x 20     MT: STM using therabar to R lateral/posterior hip/ITB x 8 min     Education provided:      - Discussed monitoring symptoms and stopping activities which cause increased pain       Education provided:     - Discussed monitoring symptoms and stopping activities which cause increased pain    Written Home Exercises Provided: Continue exercises given prior visit  Exercises were reviewed and Steff was able to demonstrate them prior to the end of the session.  Steff demonstrated good  understanding of the education provided.      See EMR under Media for exercises provided 6/9/2020.    Assessment     Good tolerance of added exercises, tenderness persists R lateral thigh/hip  Steff is progressing well towards her goals.   Pt  prognosis is Good.     Pt will continue to benefit from skilled outpatient physical therapy to address the deficits listed in the problem list box on initial evaluation, provide pt/family education and to maximize pt's level of independence in the home and community environment.     Pt's spiritual, cultural and educational needs considered and pt agreeable to plan of care and goals.     Anticipated barriers to physical therapy: pain    Goals:  Short Term Goals: 3 weeks   1) Patient will initiate HEP  2) Patient will improve strength 1/2 muscle grade     Long Term Goals: 6 weeks   1) Patient will be independent in HEP  2) Patient will improve BAEZA scale   3) Patient will improve functional outcome measure LEFS to <20% impairment     Short Term Goal Status:  1) Not met  2) Not met      Long Term Goal Status:  1) Not met  2) Not assessed this date  3) Not assessed this date         Plan     Continue with established Plan of Care towards PT goals. Progression of flexibility, strengthening, gait activities per pt tolerance

## 2020-06-26 ENCOUNTER — CLINICAL SUPPORT (OUTPATIENT)
Dept: REHABILITATION | Facility: HOSPITAL | Age: 60
End: 2020-06-26
Attending: NURSE PRACTITIONER
Payer: MEDICARE

## 2020-06-26 DIAGNOSIS — R26.9 GAIT ABNORMALITY: ICD-10-CM

## 2020-06-26 PROCEDURE — 97140 MANUAL THERAPY 1/> REGIONS: CPT | Mod: PN

## 2020-06-26 PROCEDURE — 97110 THERAPEUTIC EXERCISES: CPT | Mod: PN

## 2020-06-26 NOTE — PROGRESS NOTES
Physical Therapy Daily Treatment Note     Time In: 0848  Time Out: 0930  Total Billable Time: 42 minutes    Name: Steff Johnson  Clinic Number: [id number]    Therapy Diagnosis:   Encounter Diagnosis   Name Primary?    Gait abnormality      Physician: Cassidy Shaw DNP, *    Visit Date: 6/26/2020    Physician Orders: PT Eval and Treat   Medical Diagnosis from Referral: I63.50 (ICD-10-CM) - Cerebrovascular accident (CVA) of left pontine structure  Evaluation Date: 6/5/2020  Authorization Period Expiration: 07/19/2020  Plan of Care Expiration: 07/30/2020  Visit # / Visits authorized: 2/ 6         Precautions: Falls, DM, HTN, COPD    Subjective     Pt reports: feeling less pain in her hip.  She was compliant with home exercise program  Response to previous treatment: no complaints  Functional change: no    Pain: 6/10  Location: R lateral hip/thigh  Objective     Steff received therapeutic exercises to develop strength, endurance, ROM and flexibility for 34 minutes including:     Nustep L4 x 10 min UE's/LE's  Standing gastroc stretch using 1/2 foam roll 3/30 sec B  Airex HR/TR x 30  SLS level tile 3/30 sec L/R  Minisquats on level tile x 1 with tactile/verbal cues for avoiding excessive anterior tibial translation  SAQ 2# x 20 L/R  SLR x 20 L/R  Hip adduction ball squeezes x 20     MT: STM using therabar to R lateral/posterior hip/ITB x 8 min       Education provided:     - Continue with current home exercise program as instructed  - Discussed monitoring symptoms and stopping activities which cause increased pain  - Confirmed date/time of next appointment    Written Home Exercises Provided: Patient instructed to cont prior HEP.  Exercises were reviewed and Steff was able to demonstrate them prior to the end of the session.  Steff demonstrated good  understanding of the education provided.     See EMR under Media for exercises provided 6/9/20.    Assessment     Good tolerance for therex. Gait with no AD,  mildly unsteady with 2 self-corrected LOB.  Steff is progressing well towards her goals.   Pt prognosis is Good.     Pt will continue to benefit from skilled outpatient physical therapy to address the deficits listed in the problem list box on initial evaluation, provide pt/family education and to maximize pt's level of independence in the home and community environment.     Pt's spiritual, cultural and educational needs considered and pt agreeable to plan of care and goals.     Anticipated barriers to physical therapy: pain    Goals:  Short Term Goals: 3 weeks   1) Patient will initiate HEP  2) Patient will improve strength 1/2 muscle grade     Long Term Goals: 6 weeks   1) Patient will be independent in HEP  2) Patient will improve BAEZA scale   3) Patient will improve functional outcome measure LEFS to <20% impairment     Short Term Goal Status:  1) Not met  2) Not met      Long Term Goal Status:  1) Not met  2) Not assessed this date  3) Not assessed this date       Plan     Continue with established Plan of Care towards PT goals. Progression of strengthening, flexibility, and balance per pt tolerance.

## 2020-06-29 ENCOUNTER — CLINICAL SUPPORT (OUTPATIENT)
Dept: REHABILITATION | Facility: HOSPITAL | Age: 60
End: 2020-06-29
Attending: NURSE PRACTITIONER
Payer: MEDICARE

## 2020-06-29 DIAGNOSIS — R26.9 GAIT ABNORMALITY: ICD-10-CM

## 2020-06-29 PROCEDURE — 97110 THERAPEUTIC EXERCISES: CPT | Mod: PN,CQ

## 2020-06-29 PROCEDURE — 97112 NEUROMUSCULAR REEDUCATION: CPT | Mod: PN,CQ

## 2020-06-29 NOTE — PROGRESS NOTES
"  Physical Therapy Treatment Note     Name: Steff Johnson  Clinic Number: [id number]    Therapy Diagnosis:   Encounter Diagnosis   Name Primary?    Gait abnormality      Physician: Cassidy Shaw DNP, *    Visit Date: 6/29/2020    Physician Orders: PT Eval and Treat   Medical Diagnosis from Referral: I63.50 (ICD-10-CM) - Cerebrovascular accident (CVA) of left pontine structure  Evaluation Date: 6/5/2020  Authorization Period Expiration: 07/19/2020  Plan of Care Expiration: 07/30/2020  Visit # / Visits authorized: 3/ 6     Time In: 0945  Time Out: 1030  Total Billable Time: 45 minutes    Precautions: Diabetes, Fall and HTN and COPD    Subjective     Pt reports: Doing well.  She was compliant with home exercise program.  Response to previous treatment: no problem  Functional change: improved mobility    Pain: 8/10  Location:R hip     Objective     Steff received therapeutic exercises to develop strength, endurance, ROM and flexibility for 10 minutes including:    NuStep Lv4 10 with UE's      Steff participated in neuromuscular re-education activities to improve: Balance, Coordination, Kinesthetic, Sense, Proprioception and Posture for 35 minutes. The following activities were included:    Hamstring stretch 3x30" sit R/L  Gastroc stretch 3x30" sit R/L  // bar: HR/TR Airex x20   Tandem gait 12'x4   Braiding 12'x2 R<>L   SLS Airex Green Tdisc 3x20" R/L   Step Up 4" box x20 R  Gait 100' with cues to decrease R hip abduction during swing     Home Exercises Provided and Patient Education Provided     Education provided:   - Head up with all activities    Written Home Exercises Provided: Patient instructed to cont prior HEP.  Exercises were reviewed and Steff was able to demonstrate them prior to the end of the session.  Steff demonstrated good  understanding of the education provided.     See EMR under Patient Instructions for exercises provided prior visit.    Assessment     Patient tolerated activities with " cues to increase head up with all activities.    Steff is progressing well towards her goals.   Pt prognosis is Good.     Pt will continue to benefit from skilled outpatient physical therapy to address the deficits listed in the problem list box on initial evaluation, provide pt/family education and to maximize pt's level of independence in the home and community environment.     Pt's spiritual, cultural and educational needs considered and pt agreeable to plan of care and goals.     Anticipated barriers to physical therapy: none    Goals:     Short Term Goals: 3 weeks   1) Patient will initiate HEP  2) Patient will improve strength 1/2 muscle grade     Long Term Goals: 6 weeks   1) Patient will be independent in HEP  2) Patient will improve BAEZA scale   3) Patient will improve functional outcome measure LEFS to <20% impairment      Plan     Continue with POC to increase activity endurance as patient tolerates.    Jonelle Bentley, PTA

## 2020-06-30 ENCOUNTER — PATIENT OUTREACH (OUTPATIENT)
Dept: ADMINISTRATIVE | Facility: OTHER | Age: 60
End: 2020-06-30

## 2020-06-30 NOTE — PROGRESS NOTES
Chart was reviewed for overdue [facility] Encounters (DENEEN)  topics  Updates were requested from care everywhere  Health Maintenance has been updated

## 2020-07-02 ENCOUNTER — OFFICE VISIT (OUTPATIENT)
Dept: OPHTHALMOLOGY | Facility: CLINIC | Age: 60
End: 2020-07-02
Payer: MEDICARE

## 2020-07-02 DIAGNOSIS — H52.7 REFRACTIVE ERROR: ICD-10-CM

## 2020-07-02 DIAGNOSIS — H40.023 OPEN ANGLE WITH BORDERLINE FINDINGS AND HIGH GLAUCOMA RISK IN BOTH EYES: ICD-10-CM

## 2020-07-02 DIAGNOSIS — H25.13 AGE-RELATED NUCLEAR CATARACT OF BOTH EYES: ICD-10-CM

## 2020-07-02 DIAGNOSIS — H40.053 ELEVATED IOP, BILATERAL: Primary | ICD-10-CM

## 2020-07-02 PROCEDURE — 99499 UNLISTED E&M SERVICE: CPT | Mod: S$GLB,,, | Performed by: OPHTHALMOLOGY

## 2020-07-02 PROCEDURE — 92012 INTRM OPH EXAM EST PATIENT: CPT | Mod: S$GLB,,, | Performed by: OPHTHALMOLOGY

## 2020-07-02 PROCEDURE — 99999 PR PBB SHADOW E&M-EST. PATIENT-LVL IV: ICD-10-PCS | Mod: PBBFAC,,, | Performed by: OPHTHALMOLOGY

## 2020-07-02 PROCEDURE — 99999 PR PBB SHADOW E&M-EST. PATIENT-LVL IV: CPT | Mod: PBBFAC,,, | Performed by: OPHTHALMOLOGY

## 2020-07-02 PROCEDURE — 99499 RISK ADDL DX/OHS AUDIT: ICD-10-PCS | Mod: S$GLB,,, | Performed by: OPHTHALMOLOGY

## 2020-07-02 PROCEDURE — 92020 GONIOSCOPY: CPT | Mod: S$GLB,,, | Performed by: OPHTHALMOLOGY

## 2020-07-02 PROCEDURE — 92020 PR SPECIAL EYE EVAL,GONIOSCOPY: ICD-10-PCS | Mod: S$GLB,,, | Performed by: OPHTHALMOLOGY

## 2020-07-02 PROCEDURE — 92012 PR EYE EXAM, EST PATIENT,INTERMED: ICD-10-PCS | Mod: S$GLB,,, | Performed by: OPHTHALMOLOGY

## 2020-07-02 RX ORDER — LATANOPROST 50 UG/ML
1 SOLUTION/ DROPS OPHTHALMIC NIGHTLY
Qty: 2.5 ML | Refills: 12 | Status: SHIPPED | OUTPATIENT
Start: 2020-07-02 | End: 2021-07-27

## 2020-07-02 RX ORDER — ERGOCALCIFEROL 1.25 MG/1
CAPSULE ORAL
COMMUNITY
Start: 2020-05-05 | End: 2020-11-19 | Stop reason: SDUPTHER

## 2020-07-02 NOTE — PATIENT INSTRUCTIONS
"Latanoprost 1 drop at bedtime both eyes      What Is Glaucoma?    Glaucoma is an eye disease that can cause blindness. If caught early, it can usually be controlled. But it often has no symptoms, so you need regular eye exams. Glaucoma usually begins when pressure builds up in the eye. This pressure can damage the optic nerve. The optic nerve sends messages to the brain so you can see. There are two main kinds of glaucoma: "open-angle" and "closed-angle."  Drainage area  The eye is always producing fluid. The eye's drainage areas may become clogged or blocked. Too much fluid stays in the eye. This increases eye pressure.  Optic nerve  Too much pressure in the eye can damage the optic nerve. If damaged, this nerve cannot send the messages to the brain that let you see.  Open-Angle Glaucoma  Open-angle is the most common kind of glaucoma. It occurs slowly as people age. The drainage area in the eye becomes clogged. Not enough fluid drains from the eye, so pressure slowly builds up. This causes gradual loss of side (peripheral) vision. You may not even notice changes until much of your vision is lost.  Closed-Angle Glaucoma  Closed-angle glaucoma is less common than open-angle. It usually comes on quickly. The drainage area in the eye suddenly becomes completely blocked. Eye pressure builds rapidly. You may notice blurred vision and rainbow halos around lights. You may also have headaches, nausea, vomiting, and severe pain. If not treated right away, blindness can occur quickly.  Date Last Reviewed: 6/1/2015  © 9774-5854 [employer]. [address] [place] [address], [place] [address]. All rights reserved. This information is not intended as a substitute for professional medical care. Always follow your healthcare professional's instructions.        "

## 2020-07-02 NOTE — PROGRESS NOTES
HPI     DLE- 9/9/19     Pt is here for a sudden stroke that happened in December of 2019. Pt   stated since this stroke she has lost a lot of va in her OD and just   wanted to check on the health of her eye. Denies flashes or floaters.   Denies eye pain. DM is currently uncontrolled with medication. HTN often   fluctuates with meds.     Hemoglobin A1C       Date                     Value               Ref Range             Status                05/21/2020               8.9 (H)             4.0 - 5.6 %           Final                 02/06/2020               10.1 (H)            4.0 - 5.6 %           Final                 12/13/2019               11.0 (H)            4.5 - 6.2 %           Final                  Last edited by Min Loja on 7/2/2020 11:09 AM. (History)            Assessment /Plan     For exam results, see Encounter Report.    Elevated IOP, bilateral  -     latanoprost 0.005 % ophthalmic solution; Place 1 drop into both eyes every evening.  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

## 2020-08-11 ENCOUNTER — PATIENT OUTREACH (OUTPATIENT)
Dept: ADMINISTRATIVE | Facility: OTHER | Age: 60
End: 2020-08-11

## 2020-08-11 NOTE — PROGRESS NOTES
Chart was reviewed for overdue [facility] Encounters (DENEEN)  topics  Updates were requested from care everywhere  Health Maintenance has been updated  [facility] immunization registry triggered

## 2020-08-17 ENCOUNTER — OFFICE VISIT (OUTPATIENT)
Dept: OPHTHALMOLOGY | Facility: CLINIC | Age: 60
End: 2020-08-17
Payer: MEDICARE

## 2020-08-17 ENCOUNTER — CLINICAL SUPPORT (OUTPATIENT)
Dept: OPHTHALMOLOGY | Facility: CLINIC | Age: 60
End: 2020-08-17
Payer: MEDICARE

## 2020-08-17 DIAGNOSIS — H40.023 OPEN ANGLE WITH BORDERLINE FINDINGS AND HIGH GLAUCOMA RISK IN BOTH EYES: Primary | ICD-10-CM

## 2020-08-17 DIAGNOSIS — E11.65 POORLY CONTROLLED TYPE 2 DIABETES MELLITUS WITH MILD NONPROLIFERATIVE RETINOPATHY: ICD-10-CM

## 2020-08-17 DIAGNOSIS — H25.13 AGE-RELATED NUCLEAR CATARACT OF BOTH EYES: ICD-10-CM

## 2020-08-17 DIAGNOSIS — E11.3299 POORLY CONTROLLED TYPE 2 DIABETES MELLITUS WITH MILD NONPROLIFERATIVE RETINOPATHY: ICD-10-CM

## 2020-08-17 PROCEDURE — 99999 PR PBB SHADOW E&M-EST. PATIENT-LVL III: CPT | Mod: PBBFAC,,, | Performed by: OPHTHALMOLOGY

## 2020-08-17 PROCEDURE — 76514 ECHO EXAM OF EYE THICKNESS: CPT | Mod: S$GLB,,, | Performed by: OPHTHALMOLOGY

## 2020-08-17 PROCEDURE — 92133 POSTERIOR SEGMENT OCT OPTIC NERVE(OCULAR COHERENCE TOMOGRAPHY) - OU - BOTH EYES: ICD-10-PCS | Mod: S$GLB,,, | Performed by: OPHTHALMOLOGY

## 2020-08-17 PROCEDURE — 92012 PR EYE EXAM, EST PATIENT,INTERMED: ICD-10-PCS | Mod: S$GLB,,, | Performed by: OPHTHALMOLOGY

## 2020-08-17 PROCEDURE — 92083 HUMPHREY VISUAL FIELD - OU - BOTH EYES: ICD-10-PCS | Mod: S$GLB,,, | Performed by: OPHTHALMOLOGY

## 2020-08-17 PROCEDURE — 99999 PR PBB SHADOW E&M-EST. PATIENT-LVL III: ICD-10-PCS | Mod: PBBFAC,,, | Performed by: OPHTHALMOLOGY

## 2020-08-17 PROCEDURE — 92020 PR SPECIAL EYE EVAL,GONIOSCOPY: ICD-10-PCS | Mod: S$GLB,,, | Performed by: OPHTHALMOLOGY

## 2020-08-17 PROCEDURE — 92012 INTRM OPH EXAM EST PATIENT: CPT | Mod: S$GLB,,, | Performed by: OPHTHALMOLOGY

## 2020-08-17 PROCEDURE — 99499 RISK ADDL DX/OHS AUDIT: ICD-10-PCS | Mod: S$GLB,,, | Performed by: OPHTHALMOLOGY

## 2020-08-17 PROCEDURE — 92020 GONIOSCOPY: CPT | Mod: S$GLB,,, | Performed by: OPHTHALMOLOGY

## 2020-08-17 PROCEDURE — 99499 UNLISTED E&M SERVICE: CPT | Mod: S$GLB,,, | Performed by: OPHTHALMOLOGY

## 2020-08-17 PROCEDURE — 92133 CPTRZD OPH DX IMG PST SGM ON: CPT | Mod: S$GLB,,, | Performed by: OPHTHALMOLOGY

## 2020-08-17 PROCEDURE — 76514 ULTRASOUND PACHYMETRY: ICD-10-PCS | Mod: S$GLB,,, | Performed by: OPHTHALMOLOGY

## 2020-08-17 PROCEDURE — 92083 EXTENDED VISUAL FIELD XM: CPT | Mod: S$GLB,,, | Performed by: OPHTHALMOLOGY

## 2020-08-17 NOTE — PROGRESS NOTES
HPI     DLS: 7/2/2020  Pt here for HVF / OCTN/ Gonio/ CCT/ IOP ck   Denies pain/ FOL/ floaters. No new complaints. States has not used   latanoprost in about a week because gtts made her eyes feel like they had   trash in them     Hemoglobin A1C       Date                     Value               Ref Range             Status                05/21/2020               8.9 (H)             4.0 - 5.6 %           Final                 02/06/2020               10.1 (H)            4.0 - 5.6 %           Final                   12/13/2019               11.0 (H)            4.5 - 6.2 %           Final                 Last edited by Shannon Blank on 8/17/2020  3:11 PM. (History)        ROS     Negative for: Constitutional, Gastrointestinal, Neurological, Skin,   Genitourinary, Musculoskeletal, HENT, Endocrine, Cardiovascular, Eyes,   Respiratory, Psychiatric, Allergic/Imm, Heme/Lymph    Last edited by Felipe Wagner Jr., MD on 8/17/2020  3:23 PM. (History)        Assessment /Plan     For exam results, see Encounter Report.    Open angle with borderline findings and high glaucoma risk in both eyes  -     Ultrasound pachymetry     -     Posterior Segment OCT Optic Nerve- Both eyes  OD WNL OS borderline thinning ST  -     Gonzalez Visual Field - OU - Extended - Both Eyes              WNL OU  Gonio open to SS x360 OU  Continue latanoprost QHS OU  Age-related nuclear cataract of both eyes  -no decrease in ADLS, no significant glare, and functionality is satisfactory  -counseled on what to expect if the cataracts worsening and how it can effect the vision  -continue to monitor and if vision changes patient can call for another appointment  Poorly controlled type 2 diabetes mellitus with mild nonproliferative retinopathy  optimizie BG and BP control  Follow up in about 4 months (around 12/17/2020) for IOP and Medication check.

## 2020-09-02 ENCOUNTER — LAB VISIT (OUTPATIENT)
Dept: LAB | Facility: HOSPITAL | Age: 60
End: 2020-09-02
Attending: NURSE PRACTITIONER
Payer: MEDICARE

## 2020-09-02 ENCOUNTER — OFFICE VISIT (OUTPATIENT)
Dept: ENDOCRINOLOGY | Facility: CLINIC | Age: 60
End: 2020-09-02
Payer: MEDICARE

## 2020-09-02 VITALS
HEIGHT: 59 IN | BODY MASS INDEX: 27.09 KG/M2 | HEART RATE: 70 BPM | DIASTOLIC BLOOD PRESSURE: 80 MMHG | SYSTOLIC BLOOD PRESSURE: 130 MMHG | OXYGEN SATURATION: 98 % | TEMPERATURE: 97 F | WEIGHT: 134.38 LBS

## 2020-09-02 DIAGNOSIS — Z79.4 TYPE 2 DIABETES MELLITUS WITH DIABETIC NEPHROPATHY, WITH LONG-TERM CURRENT USE OF INSULIN: Primary | ICD-10-CM

## 2020-09-02 DIAGNOSIS — E11.65 TYPE 2 DIABETES MELLITUS WITH HYPERGLYCEMIA, WITH LONG-TERM CURRENT USE OF INSULIN: ICD-10-CM

## 2020-09-02 DIAGNOSIS — Z79.4 TYPE 2 DIABETES MELLITUS WITH HYPERGLYCEMIA, WITH LONG-TERM CURRENT USE OF INSULIN: ICD-10-CM

## 2020-09-02 DIAGNOSIS — E11.3393 TYPE 2 DIABETES MELLITUS WITH BOTH EYES AFFECTED BY MODERATE NONPROLIFERATIVE RETINOPATHY WITHOUT MACULAR EDEMA, WITH LONG-TERM CURRENT USE OF INSULIN: ICD-10-CM

## 2020-09-02 DIAGNOSIS — I15.2 HYPERTENSION ASSOCIATED WITH DIABETES: ICD-10-CM

## 2020-09-02 DIAGNOSIS — E11.21 TYPE 2 DIABETES MELLITUS WITH DIABETIC NEPHROPATHY, WITH LONG-TERM CURRENT USE OF INSULIN: Primary | ICD-10-CM

## 2020-09-02 DIAGNOSIS — I63.9 CEREBROVASCULAR ACCIDENT (CVA) OF LEFT PONTINE STRUCTURE: ICD-10-CM

## 2020-09-02 DIAGNOSIS — E11.59 HYPERTENSION ASSOCIATED WITH DIABETES: ICD-10-CM

## 2020-09-02 DIAGNOSIS — Z79.4 TYPE 2 DIABETES MELLITUS WITH BOTH EYES AFFECTED BY MODERATE NONPROLIFERATIVE RETINOPATHY WITHOUT MACULAR EDEMA, WITH LONG-TERM CURRENT USE OF INSULIN: ICD-10-CM

## 2020-09-02 DIAGNOSIS — E78.2 MIXED HYPERLIPIDEMIA: ICD-10-CM

## 2020-09-02 DIAGNOSIS — J44.9 CHRONIC OBSTRUCTIVE PULMONARY DISEASE, UNSPECIFIED COPD TYPE: ICD-10-CM

## 2020-09-02 LAB
ALBUMIN SERPL BCP-MCNC: 3.7 G/DL (ref 3.5–5.2)
ALP SERPL-CCNC: 99 U/L (ref 55–135)
ALT SERPL W/O P-5'-P-CCNC: 18 U/L (ref 10–44)
ANION GAP SERPL CALC-SCNC: 6 MMOL/L (ref 8–16)
AST SERPL-CCNC: 16 U/L (ref 10–40)
BILIRUB SERPL-MCNC: 0.5 MG/DL (ref 0.1–1)
BUN SERPL-MCNC: 16 MG/DL (ref 8–23)
CALCIUM SERPL-MCNC: 9 MG/DL (ref 8.7–10.5)
CHLORIDE SERPL-SCNC: 102 MMOL/L (ref 95–110)
CO2 SERPL-SCNC: 31 MMOL/L (ref 23–29)
CREAT SERPL-MCNC: 1.2 MG/DL (ref 0.5–1.4)
EST. GFR  (AFRICAN AMERICAN): 56 ML/MIN/1.73 M^2
EST. GFR  (NON AFRICAN AMERICAN): 49 ML/MIN/1.73 M^2
ESTIMATED AVG GLUCOSE: 272 MG/DL (ref 68–131)
GLUCOSE SERPL-MCNC: 339 MG/DL (ref 70–110)
HBA1C MFR BLD HPLC: 11.1 % (ref 4–5.6)
POTASSIUM SERPL-SCNC: 4.1 MMOL/L (ref 3.5–5.1)
PROT SERPL-MCNC: 7.6 G/DL (ref 6–8.4)
SODIUM SERPL-SCNC: 139 MMOL/L (ref 136–145)

## 2020-09-02 PROCEDURE — 3052F PR MOST RECENT HEMOGLOBIN A1C LEVEL 8.0 - < 9.0%: ICD-10-PCS | Mod: CPTII,S$GLB,, | Performed by: NURSE PRACTITIONER

## 2020-09-02 PROCEDURE — 99999 PR PBB SHADOW E&M-EST. PATIENT-LVL V: ICD-10-PCS | Mod: PBBFAC,,, | Performed by: NURSE PRACTITIONER

## 2020-09-02 PROCEDURE — 36415 COLL VENOUS BLD VENIPUNCTURE: CPT

## 2020-09-02 PROCEDURE — 3052F HG A1C>EQUAL 8.0%<EQUAL 9.0%: CPT | Mod: CPTII,S$GLB,, | Performed by: NURSE PRACTITIONER

## 2020-09-02 PROCEDURE — 80053 COMPREHEN METABOLIC PANEL: CPT

## 2020-09-02 PROCEDURE — 99999 PR PBB SHADOW E&M-EST. PATIENT-LVL V: CPT | Mod: PBBFAC,,, | Performed by: NURSE PRACTITIONER

## 2020-09-02 PROCEDURE — 83036 HEMOGLOBIN GLYCOSYLATED A1C: CPT

## 2020-09-02 PROCEDURE — 99214 PR OFFICE/OUTPT VISIT, EST, LEVL IV, 30-39 MIN: ICD-10-PCS | Mod: S$GLB,,, | Performed by: NURSE PRACTITIONER

## 2020-09-02 PROCEDURE — 99214 OFFICE O/P EST MOD 30 MIN: CPT | Mod: S$GLB,,, | Performed by: NURSE PRACTITIONER

## 2020-09-02 RX ORDER — INSULIN ASPART 100 [IU]/ML
INJECTION, SOLUTION INTRAVENOUS; SUBCUTANEOUS
Qty: 15 ML | Refills: 12 | Status: SHIPPED | OUTPATIENT
Start: 2020-09-02 | End: 2020-12-03 | Stop reason: SDUPTHER

## 2020-09-02 NOTE — PROGRESS NOTES
CC: This 60 y.o. female presents for management of diabetes  and chronic conditions pending review including HTN, HLP, nephropathy, CVA, COPD, vitamin d deficiency     HPI: She was diagnosed with T2DM in . Has never been hospitalized r/t DM.  Family hx of DM: mom, dad, brothers, and sister      No acute events since last visit  No meter, no logs today  Reports she is using a new meter received from [employer]- at [facility] review found that sensor and meter correlation way off  Sensor reflected A1C  Did not increase insulin doses as recommended at last visit   Started PT since her last visit- also c/o hand numbness, feels tight, no edema  Stroke in December- Continues to have right sided weakness and balance issues    Checking bg 4 times a day  Fastin-170s  Lunch: 120-130  Dinner: 140s  Bedtime: 300 last night    Diet: Eats 3 meals a day, snacks-after dinner on PB crackers   Exercise: In PT  CURRENT DM MEDS: tresiba 32 u qhs, Novolog 10 u AC + correction, metformin 1000 mg bid  Vial/pen:  Uses pens,   Glucometer type:  True Metrix     Standards of Care:  Eye exam:2020  H/o+ DM retinopathy, + cataracts   Dr Wagner     Follows w Dr Ruthann barclay nephrology - needs another MD in [facility]- not willing to travel to [place]    Retired from [employer]- [employer] de[employer] (worked for them for 20 yrs)       ROS:   Gen: Appetite good,  weight stable, denies fatigue and weakness.  Skin: Skin is intact and heals well, no rashes, no hair changes  Eyes: +visual disturbances- wears glasses   Resp: no SOB or SEGUNDO, no cough  Cardiac: No palpitations, chest pain, no edema   GI: No nausea or vomiting, diarrhea, + constipation, or abdominal pain.  /GYN: nonocturia, no burning or pain.   MS/Neuro: right hand numbness; Gait unsteady, speech clear  Psych: Denies drug/ETOH abuse, no hx of depression.  Other systems: negative.    PE:  GENERAL: Well developed, well nourished.  PSYCH: AAOx3, appropriate mood and affect,  pleasant expression, conversant, appears relaxed, well groomed.   EYES: Conjunctiva, corneas clear  NECK: Supple, trachea midline  NEURO: Gait unsteady  SKIN: Skin warm and dry no acanthosis nigracans.  FOOT EXAMINATION: 11/2019  No foot deformity, corns or callus formation,  nails in good condition and well trimmed, no interspace maceration or ulceration noted.  Decreased hair growth present over toes/feet. Positive vibratory response to 128 Hz tuning fork bilaterally.  Protective sensation intact with 10 gram monofilament.  +2 dorsalis pedis and posterior pulses noted.      Lab Results   Component Value Date    MICALBCREAT 25.7 07/03/2019       Hemoglobin A1C   Date Value Ref Range Status   05/21/2020 8.9 (H) 4.0 - 5.6 % Final     Comment:     ADA Screening Guidelines:  5.7-6.4%  Consistent with prediabetes  >or=6.5%  Consistent with diabetes  High levels of fetal hemoglobin interfere with the HbA1C  assay. Heterozygous hemoglobin variants (HbS, HgC, etc)do  not significantly interfere with this assay.   However, presence of multiple variants may affect accuracy.     02/06/2020 10.1 (H) 4.0 - 5.6 % Final     Comment:     ADA Screening Guidelines:  5.7-6.4%  Consistent with prediabetes  >or=6.5%  Consistent with diabetes  High levels of fetal hemoglobin interfere with the HbA1C  assay. Heterozygous hemoglobin variants (HbS, HgC, etc)do  not significantly interfere with this assay.   However, presence of multiple variants may affect accuracy.     12/13/2019 11.0 (H) 4.5 - 6.2 % Final     Comment:     According to ADA guidelines, hemoglobin A1C <7.0% represents  optimal control in non-pregnant diabetic patients.  Different  metrics may apply to specific populations.   Standards of Medical Care in Diabetes - 2016.  For the purpose of screening for the presence of diabetes:  <5.7%     Consistent with the absence of diabetes  5.7-6.4%  Consistent with increasing risk for diabetes   (prediabetes)  >or=6.5%  Consistent  with diabetes  Currently no consensus exists for use of hemoglobin A1C  for diagnosis of diabetes for children.          ASSESSMENT and PLAN:    1. T2DM with hyperglycemia, DM nephropathy, Dm retinopathy  Labs today  Does not wish to have her own sensor- would rather stick her fingers (this is the 4th time of asked- she would really benefit from this knowledge!!)  Continue Tresiba 32 u qhs, Increase Novolog 12 u Ac + correction  Check bg 4 times a day    Discussed A1c and BG goals.   - takes  Arb, statin    2. HTN - controlled, continue meds as previously prescribed and monitor. Has not taken her meds yet this am    3. HLP - on statin therapy, LFTs WNL    4. COPD- No current steroids    5.  Vitamin d deficiency-Continue ergocalciferol 48571 IU weekly    7. CVA- optimize bg    Follow-up: in 3 months with lab prior

## 2020-09-08 ENCOUNTER — TELEPHONE (OUTPATIENT)
Dept: NEPHROLOGY | Facility: CLINIC | Age: 60
End: 2020-09-08

## 2020-09-08 NOTE — TELEPHONE ENCOUNTER
Patient scheduled for audio only visit next week and mailed to patient. A message was left to call if appointment will not work.

## 2020-09-08 NOTE — TELEPHONE ENCOUNTER
----- Message from Indigo Kaur LPN sent at 8/26/2020  1:39 PM CDT -----  Regarding: office visit and labs  Look out for rescheduling of appointment, patient needed transportation.

## 2020-09-15 ENCOUNTER — OFFICE VISIT (OUTPATIENT)
Dept: NEPHROLOGY | Facility: CLINIC | Age: 60
End: 2020-09-15
Payer: MEDICARE

## 2020-09-15 DIAGNOSIS — E11.21 TYPE 2 DIABETES MELLITUS WITH DIABETIC NEPHROPATHY, WITH LONG-TERM CURRENT USE OF INSULIN: ICD-10-CM

## 2020-09-15 DIAGNOSIS — E11.65 TYPE 2 DIABETES MELLITUS WITH HYPERGLYCEMIA, WITH LONG-TERM CURRENT USE OF INSULIN: ICD-10-CM

## 2020-09-15 DIAGNOSIS — Z79.4 TYPE 2 DIABETES MELLITUS WITH DIABETIC NEPHROPATHY, WITH LONG-TERM CURRENT USE OF INSULIN: ICD-10-CM

## 2020-09-15 DIAGNOSIS — N18.30 CKD (CHRONIC KIDNEY DISEASE) STAGE 3, GFR 30-59 ML/MIN: ICD-10-CM

## 2020-09-15 DIAGNOSIS — R80.1 PERSISTENT PROTEINURIA: Primary | ICD-10-CM

## 2020-09-15 DIAGNOSIS — Z79.4 TYPE 2 DIABETES MELLITUS WITH HYPERGLYCEMIA, WITH LONG-TERM CURRENT USE OF INSULIN: ICD-10-CM

## 2020-09-15 PROCEDURE — 3046F HEMOGLOBIN A1C LEVEL >9.0%: CPT | Mod: CPTII,95,, | Performed by: INTERNAL MEDICINE

## 2020-09-15 PROCEDURE — 3046F PR MOST RECENT HEMOGLOBIN A1C LEVEL > 9.0%: ICD-10-PCS | Mod: CPTII,95,, | Performed by: INTERNAL MEDICINE

## 2020-09-15 PROCEDURE — 99443 PR PHYSICIAN TELEPHONE EVALUATION 21-30 MIN: ICD-10-PCS | Mod: 95,,, | Performed by: INTERNAL MEDICINE

## 2020-09-15 PROCEDURE — 99443 PR PHYSICIAN TELEPHONE EVALUATION 21-30 MIN: CPT | Mod: 95,,, | Performed by: INTERNAL MEDICINE

## 2020-09-15 NOTE — PROGRESS NOTES
Subjective:       Patient ID: Steff Johnson is a 60 y.o. Black or  female who presents for new evaluation of Chronic Kidney Disease    jkj    Hypertension  Pertinent negatives include no chest pain, headaches or shortness of breath.   She is referred by her PCP for proteinuria.   She has a significant history of DM and HTN, last Hba1c was 8.5 which improved from 11 previously. She denies foamy urine, no hematuria and no frequent UTIs. She follows a low sodium diet but admits she does not ensure hydration (dislikes water) Occasional NSAID use but no herbal medications. She was a premature infant. She received PRBCs before 1972. No known kidney disease in her family    Interval history: She reports she is feeling well. No foamy urine. Last Hba1c was 9.6, down from 10.3. No LE edema and no SOB    Interval history Dec 2018: She denies HTN symptoms to include CP, HA, SOB, and blurry vision. No LE edema. She was unable to provide a urine specimen     Interval history Feb 2020: Lost to follow up. She is doing well. Last Hba1c was 10.1, down from 11. She is s/p CVA last Winter. No LE edema and no SOB.     Interval history September 2020:   The patient location is: Home  The chief complaint leading to consultation is: CKD and proteinuria   Visit type: audio only  Face to Face time with patient: zero (audio only)  29 minutes of total time spent on the encounter, which includes face to face time and non-face to face time preparing to see the patient (eg, review of tests), Obtaining and/or reviewing separately obtained history, Documenting clinical information in the electronic or other health record, Independently interpreting results (not separately reported) and communicating results to the patient/family/caregiver, or Care coordination (not separately reported).   Each patient to whom he or she provides medical services by telemedicine is:  (1) informed of the relationship between the physician and patient  and the respective role of any other health care provider with respect to management of the patient; and (2) notified that he or she may decline to receive medical services by telemedicine and may withdraw from such care at any time.  Notes: She is doing well. No new medications. Last Hba1c was 11.1    Review of Systems   Constitutional: Negative for activity change, appetite change, fatigue and unexpected weight change.   HENT: Negative for facial swelling.    Respiratory: Negative for shortness of breath.    Cardiovascular: Negative for chest pain and leg swelling.   Gastrointestinal: Positive for constipation. Negative for diarrhea.   Genitourinary: Negative for difficulty urinating, dysuria, flank pain and hematuria.   Musculoskeletal: Positive for arthralgias and back pain.   Neurological: Negative for weakness and headaches.       Objective:      Physical Exam  Constitutional:       General: She is not in acute distress.  Pulmonary:      Effort: No respiratory distress.   Neurological:      Mental Status: She is alert.   Psychiatric:         Mood and Affect: Mood normal.         Thought Content: Thought content normal.         Assessment:       1. Persistent proteinuria    2. 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    3. Type 2 diabetes mellitus with diabetic nephropathy, with long-term current use of insulin    4. Type 2 diabetes mellitus with hyperglycemia, with long-term current use of insulin        Plan:           DM nephropathy. Continue RAAS blockade (valsartan and Aldactone) for renal preservation.     HTN--per PCP      RTC 6mo

## 2020-09-26 PROBLEM — N18.30 CKD (CHRONIC KIDNEY DISEASE) STAGE 3, GFR 30-59 ML/MIN: Status: ACTIVE | Noted: 2020-09-26

## 2020-10-19 ENCOUNTER — PES CALL (OUTPATIENT)
Dept: ADMINISTRATIVE | Facility: CLINIC | Age: 60
End: 2020-10-19

## 2020-11-19 NOTE — TELEPHONE ENCOUNTER
----- Message from Lin Cooper sent at 11/19/2020 10:10 AM CST -----  Contact: Steff  Type:  RX Refill Request    Who Called: Steff  Refill or New Rx:  refill  RX Name and Strength:  ergocalciferol (ERGOCALCIFEROL) 50,000 unit Cap  How is the patient currently taking it? (ex. 1XDay):  1 every 7 days  Is this a 30 day or 90 day RX:  30 day  Preferred Pharmacy with phone number:   [facility] #[id number] [address] & [address] [address]  Phone: [phone] Fax: [phone]    Local or Mail Order:  Local  Ordering Provider:  Colin Marie Call Back Number: [phone]

## 2020-11-19 NOTE — TELEPHONE ENCOUNTER
----- Message from Lin Cooper sent at 11/19/2020 10:10 AM CST -----  Contact: Steff  Type:  RX Refill Request    Who Called: Steff  Refill or New Rx:  refill  RX Name and Strength:  ergocalciferol (ERGOCALCIFEROL) 50,000 unit Cap  How is the patient currently taking it? (ex. 1XDay):  1 every 7 days  Is this a 30 day or 90 day RX:  30 day  Preferred Pharmacy with phone number:   [facility] #[id number] [address] & [address] [address]  Phone: [phone] Fax: [phone]    Local or Mail Order:  Local  Ordering Provider:  Colin Marie Call Back Number: [phone]

## 2020-11-23 RX ORDER — ERGOCALCIFEROL 1.25 MG/1
CAPSULE ORAL
Qty: 12 CAPSULE | Refills: 4 | Status: SHIPPED | OUTPATIENT
Start: 2020-11-23 | End: 2021-02-11 | Stop reason: SDUPTHER

## 2020-11-25 ENCOUNTER — LAB VISIT (OUTPATIENT)
Dept: LAB | Facility: HOSPITAL | Age: 60
End: 2020-11-25
Attending: NURSE PRACTITIONER
Payer: MEDICARE

## 2020-11-25 DIAGNOSIS — E11.21 TYPE 2 DIABETES MELLITUS WITH DIABETIC NEPHROPATHY, WITH LONG-TERM CURRENT USE OF INSULIN: ICD-10-CM

## 2020-11-25 DIAGNOSIS — Z79.4 TYPE 2 DIABETES MELLITUS WITH DIABETIC NEPHROPATHY, WITH LONG-TERM CURRENT USE OF INSULIN: ICD-10-CM

## 2020-11-25 LAB
ALBUMIN SERPL BCP-MCNC: 3.8 G/DL (ref 3.5–5.2)
ALP SERPL-CCNC: 98 U/L (ref 55–135)
ALT SERPL W/O P-5'-P-CCNC: 19 U/L (ref 10–44)
ANION GAP SERPL CALC-SCNC: 11 MMOL/L (ref 8–16)
AST SERPL-CCNC: 14 U/L (ref 10–40)
BILIRUB SERPL-MCNC: 0.5 MG/DL (ref 0.1–1)
BUN SERPL-MCNC: 19 MG/DL (ref 8–23)
CALCIUM SERPL-MCNC: 9.2 MG/DL (ref 8.7–10.5)
CHLORIDE SERPL-SCNC: 105 MMOL/L (ref 95–110)
CO2 SERPL-SCNC: 26 MMOL/L (ref 23–29)
CREAT SERPL-MCNC: 1.1 MG/DL (ref 0.5–1.4)
EST. GFR  (AFRICAN AMERICAN): >60 ML/MIN/1.73 M^2
EST. GFR  (NON AFRICAN AMERICAN): 53.9 ML/MIN/1.73 M^2
ESTIMATED AVG GLUCOSE: 243 MG/DL (ref 68–131)
GLUCOSE SERPL-MCNC: 142 MG/DL (ref 70–110)
HBA1C MFR BLD HPLC: 10.1 % (ref 4–5.6)
POTASSIUM SERPL-SCNC: 3.9 MMOL/L (ref 3.5–5.1)
PROT SERPL-MCNC: 7.9 G/DL (ref 6–8.4)
SODIUM SERPL-SCNC: 142 MMOL/L (ref 136–145)

## 2020-11-25 PROCEDURE — 36415 COLL VENOUS BLD VENIPUNCTURE: CPT | Mod: PO

## 2020-11-25 PROCEDURE — 83036 HEMOGLOBIN GLYCOSYLATED A1C: CPT

## 2020-11-25 PROCEDURE — 80053 COMPREHEN METABOLIC PANEL: CPT

## 2020-12-01 ENCOUNTER — PATIENT OUTREACH (OUTPATIENT)
Dept: ADMINISTRATIVE | Facility: OTHER | Age: 60
End: 2020-12-01

## 2020-12-02 NOTE — PROGRESS NOTES
CC: This 60 y.o. female presents for management of diabetes  and chronic conditions pending review including HTN, HLP, nephropathy, CVA, COPD, vitamin d deficiency, stroke    HPI: She was diagnosed with T2DM in . Has never been hospitalized r/t DM.  Family hx of DM: mom, dad, brothers, and sister      No acute events since last visit  No meter, no logs today  Checking bg 3 times a day  Fastin-150s  Lunch: 200-220  Dinner: 220s     in office after [employer] sausage biscuit ~ 1 hr ago  She also reports she has been titrating her be down based on readings (has 0 hypo events)  She also completely skipped her humalog this am and gave an extra dose of Tresiba instead so her bg would not be elevated in office     Diet: Eats 3 meals a day, snacks-after dinner on crackers and cheese   Exercise:  Walks w her granddaughter 15-20 mins 3 times a week  PT definitely helped with balance but still having numbness right leg  CURRENT DM MEDS: tresiba 32 u qhs, Novolog 12 u AC + correction, metformin 1000 mg bid  Vial/pen:  Uses pens,   Glucometer type:  True Metrix     Standards of Care:  Eye exam:2020  H/o+ DM retinopathy, + cataracts   Dr Wagner     Follows w Dr Beavers w nephrology - needs another MD in [facility]- not willing to travel to [place]    Retired from [employer] de[employer] (worked for them for 20 yrs)       ROS:   Gen: Appetite good,  weight  Loss 2 lbs, denies fatigue and weakness.  Skin: Skin is intact and heals well, no rashes, no hair changes  Eyes: +visual disturbances- wears glasses   Resp: no SOB or SEGUNDO, no cough  Cardiac: No palpitations, chest pain, no edema   GI: No nausea or vomiting, diarrhea, + constipation, or abdominal pain.  /GYN: nonocturia, no burning or pain.   MS/Neuro: right hand numbness; Gait unsteady, speech clear  Psych: Denies drug/ETOH abuse, no hx of depression.  Other systems: negative.    PE:  GENERAL: Well developed, well nourished.  PSYCH: AAOx3,  appropriate mood and affect, pleasant expression, conversant, appears relaxed, well groomed.   EYES: Conjunctiva, corneas clear  NECK: Supple, trachea midline  NEURO: Gait unsteady  SKIN: Skin warm and dry no acanthosis nigracans.  FOOT EXAMINATION: 12/3/2020  No foot deformity, corns or callus formation,  nails in good condition and well trimmed, no interspace maceration or ulceration noted.  Decreased hair growth present over toes/feet. Positive vibratory response to 128 Hz tuning fork bilaterally.  Protective sensation intact with 10 gram monofilament.  +2 dorsalis pedis and posterior pulses noted.    Lab Results   Component Value Date    MICALBCREAT 25.7 07/03/2019       Hemoglobin A1C   Date Value Ref Range Status   11/25/2020 10.1 (H) 4.0 - 5.6 % Final     Comment:     ADA Screening Guidelines:  5.7-6.4%  Consistent with prediabetes  >or=6.5%  Consistent with diabetes  High levels of fetal hemoglobin interfere with the HbA1C  assay. Heterozygous hemoglobin variants (HbS, HgC, etc)do  not significantly interfere with this assay.   However, presence of multiple variants may affect accuracy.     09/02/2020 11.1 (H) 4.0 - 5.6 % Final     Comment:     ADA Screening Guidelines:  5.7-6.4%  Consistent with prediabetes  >or=6.5%  Consistent with diabetes  High levels of fetal hemoglobin interfere with the HbA1C  assay. Heterozygous hemoglobin variants (HbS, HgC, etc)do  not significantly interfere with this assay.   However, presence of multiple variants may affect accuracy.     05/21/2020 8.9 (H) 4.0 - 5.6 % Final     Comment:     ADA Screening Guidelines:  5.7-6.4%  Consistent with prediabetes  >or=6.5%  Consistent with diabetes  High levels of fetal hemoglobin interfere with the HbA1C  assay. Heterozygous hemoglobin variants (HbS, HgC, etc)do  not significantly interfere with this assay.   However, presence of multiple variants may affect accuracy.          ASSESSMENT and PLAN:    1. T2DM with hyperglycemia, DM  nephropathy, Dm retinopathy  DM education- ins admin, diet  Does not wish to have her own sensor- would rather stick her fingers (this is the 5th time of asked- she would really benefit from this knowledge!!)  Continue Tresiba 32 u qhs, Increase Novolog 12 u Ac + correction- take the insulin as prescribed  Check bg 4 times a day    Discussed A1c and BG goals.   - takes  Arb, statin    2. HTN - controlled, continue meds as previously prescribed and monitor. Has not taken her meds yet this am    3. HLP - on statin therapy, LFTs WNL    4. COPD- No current steroids    5.  Vitamin d deficiency-Continue ergocalciferol 76159 IU weekly    7. CVA- optimize bg    Follow-up: in 3 months with lab prior

## 2020-12-03 ENCOUNTER — OFFICE VISIT (OUTPATIENT)
Dept: ENDOCRINOLOGY | Facility: CLINIC | Age: 60
End: 2020-12-03
Payer: MEDICARE

## 2020-12-03 VITALS
BODY MASS INDEX: 26.69 KG/M2 | TEMPERATURE: 97 F | SYSTOLIC BLOOD PRESSURE: 135 MMHG | DIASTOLIC BLOOD PRESSURE: 86 MMHG | OXYGEN SATURATION: 97 % | HEIGHT: 59 IN | WEIGHT: 132.38 LBS | HEART RATE: 77 BPM

## 2020-12-03 DIAGNOSIS — E11.59 HYPERTENSION ASSOCIATED WITH DIABETES: ICD-10-CM

## 2020-12-03 DIAGNOSIS — Z79.4 TYPE 2 DIABETES MELLITUS WITH DIABETIC NEPHROPATHY, WITH LONG-TERM CURRENT USE OF INSULIN: ICD-10-CM

## 2020-12-03 DIAGNOSIS — E78.2 MIXED HYPERLIPIDEMIA: ICD-10-CM

## 2020-12-03 DIAGNOSIS — I15.2 HYPERTENSION ASSOCIATED WITH DIABETES: ICD-10-CM

## 2020-12-03 DIAGNOSIS — E55.9 VITAMIN D DEFICIENCY: ICD-10-CM

## 2020-12-03 DIAGNOSIS — E11.3393 TYPE 2 DIABETES MELLITUS WITH BOTH EYES AFFECTED BY MODERATE NONPROLIFERATIVE RETINOPATHY WITHOUT MACULAR EDEMA, WITH LONG-TERM CURRENT USE OF INSULIN: Primary | ICD-10-CM

## 2020-12-03 DIAGNOSIS — E11.21 TYPE 2 DIABETES MELLITUS WITH DIABETIC NEPHROPATHY, WITH LONG-TERM CURRENT USE OF INSULIN: ICD-10-CM

## 2020-12-03 DIAGNOSIS — I63.9 CEREBROVASCULAR ACCIDENT (CVA) OF LEFT PONTINE STRUCTURE: ICD-10-CM

## 2020-12-03 DIAGNOSIS — Z79.4 TYPE 2 DIABETES MELLITUS WITH BOTH EYES AFFECTED BY MODERATE NONPROLIFERATIVE RETINOPATHY WITHOUT MACULAR EDEMA, WITH LONG-TERM CURRENT USE OF INSULIN: Primary | ICD-10-CM

## 2020-12-03 PROCEDURE — 3008F PR BODY MASS INDEX (BMI) DOCUMENTED: ICD-10-PCS | Mod: CPTII,S$GLB,, | Performed by: NURSE PRACTITIONER

## 2020-12-03 PROCEDURE — 99214 PR OFFICE/OUTPT VISIT, EST, LEVL IV, 30-39 MIN: ICD-10-PCS | Mod: S$GLB,,, | Performed by: NURSE PRACTITIONER

## 2020-12-03 PROCEDURE — 3008F BODY MASS INDEX DOCD: CPT | Mod: CPTII,S$GLB,, | Performed by: NURSE PRACTITIONER

## 2020-12-03 PROCEDURE — 99999 PR PBB SHADOW E&M-EST. PATIENT-LVL IV: ICD-10-PCS | Mod: PBBFAC,,, | Performed by: NURSE PRACTITIONER

## 2020-12-03 PROCEDURE — 99214 OFFICE O/P EST MOD 30 MIN: CPT | Mod: S$GLB,,, | Performed by: NURSE PRACTITIONER

## 2020-12-03 PROCEDURE — 1125F PR PAIN SEVERITY QUANTIFIED, PAIN PRESENT: ICD-10-PCS | Mod: S$GLB,,, | Performed by: NURSE PRACTITIONER

## 2020-12-03 PROCEDURE — 1125F AMNT PAIN NOTED PAIN PRSNT: CPT | Mod: S$GLB,,, | Performed by: NURSE PRACTITIONER

## 2020-12-03 PROCEDURE — 99999 PR PBB SHADOW E&M-EST. PATIENT-LVL IV: CPT | Mod: PBBFAC,,, | Performed by: NURSE PRACTITIONER

## 2020-12-03 RX ORDER — INSULIN ASPART 100 [IU]/ML
INJECTION, SOLUTION INTRAVENOUS; SUBCUTANEOUS
Qty: 15 ML | Refills: 12 | Status: SHIPPED | OUTPATIENT
Start: 2020-12-03 | End: 2021-02-23

## 2020-12-05 ENCOUNTER — PATIENT OUTREACH (OUTPATIENT)
Dept: ADMINISTRATIVE | Facility: HOSPITAL | Age: 60
End: 2020-12-05

## 2020-12-06 NOTE — PROGRESS NOTES
Immunizations reviewed. Legacy reviewed. Care Everywhere reviewed.  [facility], [facility], and [employer] [facility] [employer] Provider Portal reviewed.  Chart review completed.               NATA

## 2021-01-28 ENCOUNTER — PATIENT OUTREACH (OUTPATIENT)
Dept: ADMINISTRATIVE | Facility: HOSPITAL | Age: 61
End: 2021-01-28

## 2021-01-28 DIAGNOSIS — Z12.31 SCREENING MAMMOGRAM, ENCOUNTER FOR: Primary | ICD-10-CM

## 2021-02-11 ENCOUNTER — OFFICE VISIT (OUTPATIENT)
Dept: FAMILY MEDICINE | Facility: CLINIC | Age: 61
End: 2021-02-11
Payer: MEDICARE

## 2021-02-11 VITALS
RESPIRATION RATE: 12 BRPM | OXYGEN SATURATION: 97 % | SYSTOLIC BLOOD PRESSURE: 180 MMHG | DIASTOLIC BLOOD PRESSURE: 100 MMHG | WEIGHT: 131.38 LBS | HEART RATE: 81 BPM | TEMPERATURE: 98 F | BODY MASS INDEX: 26.54 KG/M2

## 2021-02-11 DIAGNOSIS — E78.2 MIXED HYPERLIPIDEMIA: ICD-10-CM

## 2021-02-11 DIAGNOSIS — I10 HYPERTENSION, UNCONTROLLED: ICD-10-CM

## 2021-02-11 DIAGNOSIS — Z79.4 TYPE 2 DIABETES MELLITUS WITH HYPERGLYCEMIA, WITH LONG-TERM CURRENT USE OF INSULIN: Primary | ICD-10-CM

## 2021-02-11 DIAGNOSIS — E11.65 TYPE 2 DIABETES MELLITUS WITH HYPERGLYCEMIA, WITH LONG-TERM CURRENT USE OF INSULIN: Primary | ICD-10-CM

## 2021-02-11 DIAGNOSIS — E11.21 TYPE 2 DIABETES MELLITUS WITH DIABETIC NEPHROPATHY, WITH LONG-TERM CURRENT USE OF INSULIN: ICD-10-CM

## 2021-02-11 DIAGNOSIS — E11.59 HYPERTENSION ASSOCIATED WITH DIABETES: ICD-10-CM

## 2021-02-11 DIAGNOSIS — I10 UNCONTROLLED HYPERTENSION: ICD-10-CM

## 2021-02-11 DIAGNOSIS — Z01.00 DIABETIC EYE EXAM: ICD-10-CM

## 2021-02-11 DIAGNOSIS — E55.9 VITAMIN D DEFICIENCY: ICD-10-CM

## 2021-02-11 DIAGNOSIS — E11.9 DIABETIC EYE EXAM: ICD-10-CM

## 2021-02-11 DIAGNOSIS — Z79.4 TYPE 2 DIABETES MELLITUS WITH DIABETIC NEPHROPATHY, WITH LONG-TERM CURRENT USE OF INSULIN: ICD-10-CM

## 2021-02-11 DIAGNOSIS — Z23 FLU VACCINE NEED: ICD-10-CM

## 2021-02-11 DIAGNOSIS — I15.2 HYPERTENSION ASSOCIATED WITH DIABETES: ICD-10-CM

## 2021-02-11 DIAGNOSIS — I63.9 CEREBROVASCULAR ACCIDENT (CVA) OF LEFT PONTINE STRUCTURE: ICD-10-CM

## 2021-02-11 DIAGNOSIS — J44.9 CHRONIC OBSTRUCTIVE PULMONARY DISEASE, UNSPECIFIED COPD TYPE: ICD-10-CM

## 2021-02-11 PROCEDURE — 99999 PR PBB SHADOW E&M-EST. PATIENT-LVL V: CPT | Mod: PBBFAC,,, | Performed by: FAMILY MEDICINE

## 2021-02-11 PROCEDURE — 3046F PR MOST RECENT HEMOGLOBIN A1C LEVEL > 9.0%: ICD-10-PCS | Mod: CPTII,S$GLB,, | Performed by: FAMILY MEDICINE

## 2021-02-11 PROCEDURE — 3080F PR MOST RECENT DIASTOLIC BLOOD PRESSURE >= 90 MM HG: ICD-10-PCS | Mod: CPTII,S$GLB,, | Performed by: FAMILY MEDICINE

## 2021-02-11 PROCEDURE — 99499 RISK ADDL DX/OHS AUDIT: ICD-10-PCS | Mod: S$GLB,,, | Performed by: FAMILY MEDICINE

## 2021-02-11 PROCEDURE — 3046F HEMOGLOBIN A1C LEVEL >9.0%: CPT | Mod: CPTII,S$GLB,, | Performed by: FAMILY MEDICINE

## 2021-02-11 PROCEDURE — 1125F PR PAIN SEVERITY QUANTIFIED, PAIN PRESENT: ICD-10-PCS | Mod: S$GLB,,, | Performed by: FAMILY MEDICINE

## 2021-02-11 PROCEDURE — 99499 UNLISTED E&M SERVICE: CPT | Mod: S$GLB,,, | Performed by: FAMILY MEDICINE

## 2021-02-11 PROCEDURE — 1125F AMNT PAIN NOTED PAIN PRSNT: CPT | Mod: S$GLB,,, | Performed by: FAMILY MEDICINE

## 2021-02-11 PROCEDURE — 3077F PR MOST RECENT SYSTOLIC BLOOD PRESSURE >= 140 MM HG: ICD-10-PCS | Mod: CPTII,S$GLB,, | Performed by: FAMILY MEDICINE

## 2021-02-11 PROCEDURE — 3077F SYST BP >= 140 MM HG: CPT | Mod: CPTII,S$GLB,, | Performed by: FAMILY MEDICINE

## 2021-02-11 PROCEDURE — 99214 PR OFFICE/OUTPT VISIT, EST, LEVL IV, 30-39 MIN: ICD-10-PCS | Mod: S$GLB,,, | Performed by: FAMILY MEDICINE

## 2021-02-11 PROCEDURE — 3080F DIAST BP >= 90 MM HG: CPT | Mod: CPTII,S$GLB,, | Performed by: FAMILY MEDICINE

## 2021-02-11 PROCEDURE — 3008F BODY MASS INDEX DOCD: CPT | Mod: CPTII,S$GLB,, | Performed by: FAMILY MEDICINE

## 2021-02-11 PROCEDURE — 99214 OFFICE O/P EST MOD 30 MIN: CPT | Mod: S$GLB,,, | Performed by: FAMILY MEDICINE

## 2021-02-11 PROCEDURE — 99999 PR PBB SHADOW E&M-EST. PATIENT-LVL V: ICD-10-PCS | Mod: PBBFAC,,, | Performed by: FAMILY MEDICINE

## 2021-02-11 PROCEDURE — 3008F PR BODY MASS INDEX (BMI) DOCUMENTED: ICD-10-PCS | Mod: CPTII,S$GLB,, | Performed by: FAMILY MEDICINE

## 2021-02-11 RX ORDER — AMLODIPINE AND VALSARTAN 10; 320 MG/1; MG/1
1 TABLET ORAL DAILY
Qty: 90 TABLET | Refills: 3 | Status: SHIPPED | OUTPATIENT
Start: 2021-02-11 | End: 2021-10-01 | Stop reason: SDUPTHER

## 2021-02-11 RX ORDER — SPIRONOLACTONE 25 MG/1
25 TABLET ORAL DAILY
Qty: 90 TABLET | Refills: 3 | Status: ON HOLD | OUTPATIENT
Start: 2021-02-11 | End: 2021-07-04 | Stop reason: HOSPADM

## 2021-02-11 RX ORDER — ERGOCALCIFEROL 1.25 MG/1
CAPSULE ORAL
Qty: 12 CAPSULE | Refills: 4 | Status: SHIPPED | OUTPATIENT
Start: 2021-02-11 | End: 2022-02-01 | Stop reason: ALTCHOICE

## 2021-02-11 RX ORDER — CLONIDINE 0.3 MG/24H
1 PATCH, EXTENDED RELEASE TRANSDERMAL
Qty: 12 PATCH | Refills: 3 | Status: ON HOLD | OUTPATIENT
Start: 2021-02-11 | End: 2021-03-31 | Stop reason: HOSPADM

## 2021-02-11 RX ORDER — METOPROLOL SUCCINATE 200 MG/1
200 TABLET, EXTENDED RELEASE ORAL DAILY
Qty: 90 TABLET | Refills: 3 | Status: ON HOLD | OUTPATIENT
Start: 2021-02-11 | End: 2021-07-04 | Stop reason: SDUPTHER

## 2021-02-11 RX ORDER — UMECLIDINIUM 62.5 UG/1
62.5 AEROSOL, POWDER ORAL DAILY
Qty: 90 EACH | Refills: 3 | Status: SHIPPED | OUTPATIENT
Start: 2021-02-11 | End: 2022-01-16 | Stop reason: CLARIF

## 2021-02-11 RX ORDER — HYDRALAZINE HYDROCHLORIDE 50 MG/1
50 TABLET, FILM COATED ORAL 3 TIMES DAILY
Qty: 270 TABLET | Refills: 3 | Status: ON HOLD | OUTPATIENT
Start: 2021-02-11 | End: 2021-03-31 | Stop reason: HOSPADM

## 2021-02-11 RX ORDER — NAPROXEN SODIUM 220 MG/1
81 TABLET, FILM COATED ORAL DAILY
Qty: 90 TABLET | Refills: 3 | Status: SHIPPED | OUTPATIENT
Start: 2021-02-11 | End: 2021-06-02 | Stop reason: SDUPTHER

## 2021-02-11 RX ORDER — CHLORTHALIDONE 50 MG/1
50 TABLET ORAL DAILY
Qty: 90 TABLET | Refills: 3 | Status: SHIPPED | OUTPATIENT
Start: 2021-02-11 | End: 2021-10-01 | Stop reason: SDUPTHER

## 2021-02-11 RX ORDER — ATORVASTATIN CALCIUM 40 MG/1
40 TABLET, FILM COATED ORAL DAILY
Qty: 90 TABLET | Refills: 3 | Status: SHIPPED | OUTPATIENT
Start: 2021-02-11 | End: 2021-10-01 | Stop reason: SDUPTHER

## 2021-02-11 RX ORDER — CLOPIDOGREL BISULFATE 75 MG/1
75 TABLET ORAL DAILY
Qty: 90 TABLET | Refills: 3 | Status: ON HOLD | OUTPATIENT
Start: 2021-02-11 | End: 2021-07-04 | Stop reason: HOSPADM

## 2021-02-12 ENCOUNTER — TELEPHONE (OUTPATIENT)
Dept: INTERNAL MEDICINE | Facility: CLINIC | Age: 61
End: 2021-02-12

## 2021-02-17 ENCOUNTER — TELEPHONE (OUTPATIENT)
Dept: INTERNAL MEDICINE | Facility: CLINIC | Age: 61
End: 2021-02-17

## 2021-02-25 ENCOUNTER — LAB VISIT (OUTPATIENT)
Dept: LAB | Facility: HOSPITAL | Age: 61
End: 2021-02-25
Attending: NURSE PRACTITIONER
Payer: MEDICARE

## 2021-02-25 DIAGNOSIS — Z79.4 TYPE 2 DIABETES MELLITUS WITH BOTH EYES AFFECTED BY MODERATE NONPROLIFERATIVE RETINOPATHY WITHOUT MACULAR EDEMA, WITH LONG-TERM CURRENT USE OF INSULIN: ICD-10-CM

## 2021-02-25 DIAGNOSIS — E11.3393 TYPE 2 DIABETES MELLITUS WITH BOTH EYES AFFECTED BY MODERATE NONPROLIFERATIVE RETINOPATHY WITHOUT MACULAR EDEMA, WITH LONG-TERM CURRENT USE OF INSULIN: ICD-10-CM

## 2021-02-25 DIAGNOSIS — E55.9 VITAMIN D DEFICIENCY: ICD-10-CM

## 2021-02-25 PROCEDURE — 36415 COLL VENOUS BLD VENIPUNCTURE: CPT | Mod: PO

## 2021-02-25 PROCEDURE — 82306 VITAMIN D 25 HYDROXY: CPT

## 2021-02-25 PROCEDURE — 80053 COMPREHEN METABOLIC PANEL: CPT

## 2021-02-25 PROCEDURE — 83036 HEMOGLOBIN GLYCOSYLATED A1C: CPT

## 2021-02-26 LAB
25(OH)D3+25(OH)D2 SERPL-MCNC: 35 NG/ML (ref 30–96)
ALBUMIN SERPL BCP-MCNC: 3.9 G/DL (ref 3.5–5.2)
ALP SERPL-CCNC: 93 U/L (ref 55–135)
ALT SERPL W/O P-5'-P-CCNC: 22 U/L (ref 10–44)
ANION GAP SERPL CALC-SCNC: 10 MMOL/L (ref 8–16)
AST SERPL-CCNC: 23 U/L (ref 10–40)
BILIRUB SERPL-MCNC: 0.6 MG/DL (ref 0.1–1)
BUN SERPL-MCNC: 16 MG/DL (ref 8–23)
CALCIUM SERPL-MCNC: 9.3 MG/DL (ref 8.7–10.5)
CHLORIDE SERPL-SCNC: 107 MMOL/L (ref 95–110)
CO2 SERPL-SCNC: 26 MMOL/L (ref 23–29)
CREAT SERPL-MCNC: 1 MG/DL (ref 0.5–1.4)
EST. GFR  (AFRICAN AMERICAN): >60 ML/MIN/1.73 M^2
EST. GFR  (NON AFRICAN AMERICAN): >60 ML/MIN/1.73 M^2
ESTIMATED AVG GLUCOSE: 220 MG/DL (ref 68–131)
GLUCOSE SERPL-MCNC: 86 MG/DL (ref 70–110)
HBA1C MFR BLD: 9.3 % (ref 4–5.6)
POTASSIUM SERPL-SCNC: 4.1 MMOL/L (ref 3.5–5.1)
PROT SERPL-MCNC: 7.9 G/DL (ref 6–8.4)
SODIUM SERPL-SCNC: 143 MMOL/L (ref 136–145)

## 2021-03-01 ENCOUNTER — PATIENT OUTREACH (OUTPATIENT)
Dept: ADMINISTRATIVE | Facility: OTHER | Age: 61
End: 2021-03-01

## 2021-03-25 ENCOUNTER — OFFICE VISIT (OUTPATIENT)
Dept: FAMILY MEDICINE | Facility: CLINIC | Age: 61
End: 2021-03-25
Payer: MEDICARE

## 2021-03-25 VITALS
BODY MASS INDEX: 26.13 KG/M2 | TEMPERATURE: 98 F | HEART RATE: 75 BPM | DIASTOLIC BLOOD PRESSURE: 100 MMHG | SYSTOLIC BLOOD PRESSURE: 150 MMHG | HEIGHT: 59 IN | WEIGHT: 129.63 LBS | OXYGEN SATURATION: 96 %

## 2021-03-25 DIAGNOSIS — E66.3 OVERWEIGHT (BMI 25.0-29.9): ICD-10-CM

## 2021-03-25 DIAGNOSIS — Z79.4 TYPE 2 DIABETES MELLITUS WITH DIABETIC NEPHROPATHY, WITH LONG-TERM CURRENT USE OF INSULIN: ICD-10-CM

## 2021-03-25 DIAGNOSIS — E11.59 HYPERTENSION ASSOCIATED WITH DIABETES: Primary | ICD-10-CM

## 2021-03-25 DIAGNOSIS — E11.21 TYPE 2 DIABETES MELLITUS WITH DIABETIC NEPHROPATHY, WITH LONG-TERM CURRENT USE OF INSULIN: ICD-10-CM

## 2021-03-25 DIAGNOSIS — I15.2 HYPERTENSION ASSOCIATED WITH DIABETES: Primary | ICD-10-CM

## 2021-03-25 DIAGNOSIS — E11.65 TYPE 2 DIABETES MELLITUS WITH HYPERGLYCEMIA, WITH LONG-TERM CURRENT USE OF INSULIN: ICD-10-CM

## 2021-03-25 DIAGNOSIS — Z79.4 TYPE 2 DIABETES MELLITUS WITH HYPERGLYCEMIA, WITH LONG-TERM CURRENT USE OF INSULIN: ICD-10-CM

## 2021-03-25 DIAGNOSIS — E11.69 HYPERLIPIDEMIA ASSOCIATED WITH TYPE 2 DIABETES MELLITUS: ICD-10-CM

## 2021-03-25 DIAGNOSIS — E78.5 HYPERLIPIDEMIA ASSOCIATED WITH TYPE 2 DIABETES MELLITUS: ICD-10-CM

## 2021-03-25 PROCEDURE — 3046F PR MOST RECENT HEMOGLOBIN A1C LEVEL > 9.0%: ICD-10-PCS | Mod: CPTII,S$GLB,, | Performed by: NURSE PRACTITIONER

## 2021-03-25 PROCEDURE — 3080F DIAST BP >= 90 MM HG: CPT | Mod: CPTII,S$GLB,, | Performed by: NURSE PRACTITIONER

## 2021-03-25 PROCEDURE — 3008F BODY MASS INDEX DOCD: CPT | Mod: CPTII,S$GLB,, | Performed by: NURSE PRACTITIONER

## 2021-03-25 PROCEDURE — 3046F HEMOGLOBIN A1C LEVEL >9.0%: CPT | Mod: CPTII,S$GLB,, | Performed by: NURSE PRACTITIONER

## 2021-03-25 PROCEDURE — 99999 PR PBB SHADOW E&M-EST. PATIENT-LVL V: ICD-10-PCS | Mod: PBBFAC,,, | Performed by: NURSE PRACTITIONER

## 2021-03-25 PROCEDURE — 99999 PR PBB SHADOW E&M-EST. PATIENT-LVL V: CPT | Mod: PBBFAC,,, | Performed by: NURSE PRACTITIONER

## 2021-03-25 PROCEDURE — 3077F PR MOST RECENT SYSTOLIC BLOOD PRESSURE >= 140 MM HG: ICD-10-PCS | Mod: CPTII,S$GLB,, | Performed by: NURSE PRACTITIONER

## 2021-03-25 PROCEDURE — 3080F PR MOST RECENT DIASTOLIC BLOOD PRESSURE >= 90 MM HG: ICD-10-PCS | Mod: CPTII,S$GLB,, | Performed by: NURSE PRACTITIONER

## 2021-03-25 PROCEDURE — 99214 OFFICE O/P EST MOD 30 MIN: CPT | Mod: S$GLB,,, | Performed by: NURSE PRACTITIONER

## 2021-03-25 PROCEDURE — 3008F PR BODY MASS INDEX (BMI) DOCUMENTED: ICD-10-PCS | Mod: CPTII,S$GLB,, | Performed by: NURSE PRACTITIONER

## 2021-03-25 PROCEDURE — 3077F SYST BP >= 140 MM HG: CPT | Mod: CPTII,S$GLB,, | Performed by: NURSE PRACTITIONER

## 2021-03-25 PROCEDURE — 99214 PR OFFICE/OUTPT VISIT, EST, LEVL IV, 30-39 MIN: ICD-10-PCS | Mod: S$GLB,,, | Performed by: NURSE PRACTITIONER

## 2021-03-29 ENCOUNTER — OFFICE VISIT (OUTPATIENT)
Dept: OPHTHALMOLOGY | Facility: CLINIC | Age: 61
End: 2021-03-29
Payer: MEDICARE

## 2021-03-29 ENCOUNTER — HOSPITAL ENCOUNTER (OUTPATIENT)
Facility: HOSPITAL | Age: 61
Discharge: HOME | End: 2021-03-31
Attending: EMERGENCY MEDICINE | Admitting: INTERNAL MEDICINE
Payer: MEDICARE

## 2021-03-29 DIAGNOSIS — E11.3393 TYPE 2 DIABETES MELLITUS WITH BOTH EYES AFFECTED BY MODERATE NONPROLIFERATIVE RETINOPATHY WITHOUT MACULAR EDEMA, WITH LONG-TERM CURRENT USE OF INSULIN: Primary | ICD-10-CM

## 2021-03-29 DIAGNOSIS — Z79.4 TYPE 2 DIABETES MELLITUS WITH DIABETIC NEPHROPATHY, WITH LONG-TERM CURRENT USE OF INSULIN: ICD-10-CM

## 2021-03-29 DIAGNOSIS — H40.053 OCULAR HYPERTENSION, BILATERAL: ICD-10-CM

## 2021-03-29 DIAGNOSIS — I16.0 HYPERTENSIVE URGENCY: Primary | ICD-10-CM

## 2021-03-29 DIAGNOSIS — E11.21 TYPE 2 DIABETES MELLITUS WITH DIABETIC NEPHROPATHY, WITH LONG-TERM CURRENT USE OF INSULIN: ICD-10-CM

## 2021-03-29 DIAGNOSIS — E11.65 TYPE 2 DIABETES MELLITUS WITH HYPERGLYCEMIA, WITH LONG-TERM CURRENT USE OF INSULIN: ICD-10-CM

## 2021-03-29 DIAGNOSIS — I10 HYPERTENSION: ICD-10-CM

## 2021-03-29 DIAGNOSIS — Z79.4 TYPE 2 DIABETES MELLITUS WITH HYPERGLYCEMIA, WITH LONG-TERM CURRENT USE OF INSULIN: ICD-10-CM

## 2021-03-29 DIAGNOSIS — R29.898 SHOULDER WEAKNESS: ICD-10-CM

## 2021-03-29 DIAGNOSIS — Z79.4 TYPE 2 DIABETES MELLITUS WITH BOTH EYES AFFECTED BY MODERATE NONPROLIFERATIVE RETINOPATHY WITHOUT MACULAR EDEMA, WITH LONG-TERM CURRENT USE OF INSULIN: Primary | ICD-10-CM

## 2021-03-29 DIAGNOSIS — H25.041 POSTERIOR SUBCAPSULAR AGE-RELATED CATARACT, RIGHT EYE: ICD-10-CM

## 2021-03-29 DIAGNOSIS — R07.9 CHEST PAIN: ICD-10-CM

## 2021-03-29 LAB
ALBUMIN SERPL BCP-MCNC: 4.1 G/DL (ref 3.5–5.2)
ALP SERPL-CCNC: 81 U/L (ref 55–135)
ALT SERPL W/O P-5'-P-CCNC: 21 U/L (ref 10–44)
ANION GAP SERPL CALC-SCNC: 11 MMOL/L (ref 8–16)
AST SERPL-CCNC: 18 U/L (ref 10–40)
BASOPHILS # BLD AUTO: 0.05 K/UL (ref 0–0.2)
BASOPHILS NFR BLD: 0.6 % (ref 0–1.9)
BILIRUB SERPL-MCNC: 0.9 MG/DL (ref 0.1–1)
BNP SERPL-MCNC: 36 PG/ML (ref 0–99)
BUN SERPL-MCNC: 18 MG/DL (ref 8–23)
CALCIUM SERPL-MCNC: 9.5 MG/DL (ref 8.7–10.5)
CHLORIDE SERPL-SCNC: 105 MMOL/L (ref 95–110)
CO2 SERPL-SCNC: 27 MMOL/L (ref 23–29)
CREAT SERPL-MCNC: 1.1 MG/DL (ref 0.5–1.4)
DIFFERENTIAL METHOD: ABNORMAL
EOSINOPHIL # BLD AUTO: 0.1 K/UL (ref 0–0.5)
EOSINOPHIL NFR BLD: 1.5 % (ref 0–8)
ERYTHROCYTE [DISTWIDTH] IN BLOOD BY AUTOMATED COUNT: 14.4 % (ref 11.5–14.5)
EST. GFR  (AFRICAN AMERICAN): >60 ML/MIN/1.73 M^2
EST. GFR  (NON AFRICAN AMERICAN): 53.6 ML/MIN/1.73 M^2
GLUCOSE SERPL-MCNC: 206 MG/DL (ref 70–110)
HCT VFR BLD AUTO: 36.2 % (ref 37–48.5)
HGB BLD-MCNC: 12.9 G/DL (ref 12–16)
IMM GRANULOCYTES # BLD AUTO: 0.03 K/UL (ref 0–0.04)
IMM GRANULOCYTES NFR BLD AUTO: 0.4 % (ref 0–0.5)
LYMPHOCYTES # BLD AUTO: 2.8 K/UL (ref 1–4.8)
LYMPHOCYTES NFR BLD: 35.3 % (ref 18–48)
MCH RBC QN AUTO: 27.4 PG (ref 27–31)
MCHC RBC AUTO-ENTMCNC: 35.6 G/DL (ref 32–36)
MCV RBC AUTO: 77 FL (ref 82–98)
MONOCYTES # BLD AUTO: 0.8 K/UL (ref 0.3–1)
MONOCYTES NFR BLD: 9.6 % (ref 4–15)
NEUTROPHILS # BLD AUTO: 4.2 K/UL (ref 1.8–7.7)
NEUTROPHILS NFR BLD: 52.6 % (ref 38–73)
NRBC BLD-RTO: 0 /100 WBC
PLATELET # BLD AUTO: 389 K/UL (ref 150–450)
PMV BLD AUTO: 10.1 FL (ref 9.2–12.9)
POTASSIUM SERPL-SCNC: 4.1 MMOL/L (ref 3.5–5.1)
PROT SERPL-MCNC: 7.9 G/DL (ref 6–8.4)
RBC # BLD AUTO: 4.7 M/UL (ref 4–5.4)
SODIUM SERPL-SCNC: 143 MMOL/L (ref 136–145)
TROPONIN I SERPL DL<=0.01 NG/ML-MCNC: 0.03 NG/ML
WBC # BLD AUTO: 7.91 K/UL (ref 3.9–12.7)

## 2021-03-29 PROCEDURE — 80053 COMPREHEN METABOLIC PANEL: CPT | Performed by: EMERGENCY MEDICINE

## 2021-03-29 PROCEDURE — 93005 ELECTROCARDIOGRAM TRACING: CPT | Performed by: GENERAL PRACTICE

## 2021-03-29 PROCEDURE — 1126F AMNT PAIN NOTED NONE PRSNT: CPT | Mod: S$GLB,,, | Performed by: OPHTHALMOLOGY

## 2021-03-29 PROCEDURE — 83880 ASSAY OF NATRIURETIC PEPTIDE: CPT | Performed by: EMERGENCY MEDICINE

## 2021-03-29 PROCEDURE — 25000003 PHARM REV CODE 250: Performed by: EMERGENCY MEDICINE

## 2021-03-29 PROCEDURE — 99999 PR PBB SHADOW E&M-EST. PATIENT-LVL III: CPT | Mod: PBBFAC,,, | Performed by: OPHTHALMOLOGY

## 2021-03-29 PROCEDURE — 1126F PR PAIN SEVERITY QUANTIFIED, NO PAIN PRESENT: ICD-10-PCS | Mod: S$GLB,,, | Performed by: OPHTHALMOLOGY

## 2021-03-29 PROCEDURE — 99999 PR PBB SHADOW E&M-EST. PATIENT-LVL III: ICD-10-PCS | Mod: PBBFAC,,, | Performed by: OPHTHALMOLOGY

## 2021-03-29 PROCEDURE — 99285 EMERGENCY DEPT VISIT HI MDM: CPT | Mod: 25

## 2021-03-29 PROCEDURE — 84484 ASSAY OF TROPONIN QUANT: CPT | Performed by: EMERGENCY MEDICINE

## 2021-03-29 PROCEDURE — 85025 COMPLETE CBC W/AUTO DIFF WBC: CPT | Performed by: EMERGENCY MEDICINE

## 2021-03-29 PROCEDURE — 92014 PR EYE EXAM, EST PATIENT,COMPREHESV: ICD-10-PCS | Mod: S$GLB,,, | Performed by: OPHTHALMOLOGY

## 2021-03-29 PROCEDURE — 92014 COMPRE OPH EXAM EST PT 1/>: CPT | Mod: S$GLB,,, | Performed by: OPHTHALMOLOGY

## 2021-03-29 RX ORDER — HYDROCODONE BITARTRATE AND ACETAMINOPHEN 10; 325 MG/1; MG/1
1 TABLET ORAL 3 TIMES DAILY PRN
Status: ON HOLD | COMMUNITY
End: 2021-07-04 | Stop reason: HOSPADM

## 2021-03-29 RX ORDER — NICARDIPINE HYDROCHLORIDE 0.2 MG/ML
2.5 INJECTION INTRAVENOUS CONTINUOUS
Status: DISCONTINUED | OUTPATIENT
Start: 2021-03-29 | End: 2021-03-30

## 2021-03-29 RX ADMIN — NICARDIPINE HYDROCHLORIDE 2.5 MG/HR: 0.2 INJECTION INTRAVENOUS at 09:03

## 2021-03-30 PROBLEM — I10 HYPERTENSION: Status: ACTIVE | Noted: 2021-03-30

## 2021-03-30 LAB
CHOLEST SERPL-MCNC: 191 MG/DL (ref 120–199)
CHOLEST/HDLC SERPL: 2.5 {RATIO} (ref 2–5)
GLUCOSE SERPL-MCNC: 148 MG/DL (ref 70–110)
GLUCOSE SERPL-MCNC: 179 MG/DL (ref 70–110)
GLUCOSE SERPL-MCNC: 303 MG/DL (ref 70–110)
GLUCOSE SERPL-MCNC: 400 MG/DL (ref 70–110)
HDLC SERPL-MCNC: 77 MG/DL (ref 40–75)
HDLC SERPL: 40.3 % (ref 20–50)
LDLC SERPL CALC-MCNC: 87 MG/DL (ref 63–159)
MAGNESIUM SERPL-MCNC: 2 MG/DL (ref 1.6–2.6)
NONHDLC SERPL-MCNC: 114 MG/DL
SARS-COV-2 RDRP RESP QL NAA+PROBE: NEGATIVE
TRIGL SERPL-MCNC: 135 MG/DL (ref 30–150)
TROPONIN I SERPL DL<=0.01 NG/ML-MCNC: 0.04 NG/ML

## 2021-03-30 PROCEDURE — 94761 N-INVAS EAR/PLS OXIMETRY MLT: CPT

## 2021-03-30 PROCEDURE — 99900035 HC TECH TIME PER 15 MIN (STAT)

## 2021-03-30 PROCEDURE — 82962 GLUCOSE BLOOD TEST: CPT

## 2021-03-30 PROCEDURE — 25000003 PHARM REV CODE 250: Performed by: INTERNAL MEDICINE

## 2021-03-30 PROCEDURE — 63600175 PHARM REV CODE 636 W HCPCS: Mod: GZ | Performed by: INTERNAL MEDICINE

## 2021-03-30 PROCEDURE — 83735 ASSAY OF MAGNESIUM: CPT | Performed by: INTERNAL MEDICINE

## 2021-03-30 PROCEDURE — 96376 TX/PRO/DX INJ SAME DRUG ADON: CPT

## 2021-03-30 PROCEDURE — 36415 COLL VENOUS BLD VENIPUNCTURE: CPT | Performed by: INTERNAL MEDICINE

## 2021-03-30 PROCEDURE — G0378 HOSPITAL OBSERVATION PER HR: HCPCS

## 2021-03-30 PROCEDURE — 96375 TX/PRO/DX INJ NEW DRUG ADDON: CPT

## 2021-03-30 PROCEDURE — 96365 THER/PROPH/DIAG IV INF INIT: CPT

## 2021-03-30 PROCEDURE — 96372 THER/PROPH/DIAG INJ SC/IM: CPT

## 2021-03-30 PROCEDURE — U0002 COVID-19 LAB TEST NON-CDC: HCPCS | Performed by: EMERGENCY MEDICINE

## 2021-03-30 PROCEDURE — 84484 ASSAY OF TROPONIN QUANT: CPT | Performed by: EMERGENCY MEDICINE

## 2021-03-30 PROCEDURE — 25000003 PHARM REV CODE 250: Performed by: EMERGENCY MEDICINE

## 2021-03-30 PROCEDURE — C9399 UNCLASSIFIED DRUGS OR BIOLOG: HCPCS | Performed by: INTERNAL MEDICINE

## 2021-03-30 PROCEDURE — 80061 LIPID PANEL: CPT | Performed by: INTERNAL MEDICINE

## 2021-03-30 RX ORDER — CLOPIDOGREL BISULFATE 75 MG/1
75 TABLET ORAL DAILY
Status: DISCONTINUED | OUTPATIENT
Start: 2021-03-30 | End: 2021-03-31 | Stop reason: HOSPADM

## 2021-03-30 RX ORDER — IBUPROFEN 200 MG
16 TABLET ORAL
Status: DISCONTINUED | OUTPATIENT
Start: 2021-03-30 | End: 2021-03-31 | Stop reason: HOSPADM

## 2021-03-30 RX ORDER — IBUPROFEN 200 MG
24 TABLET ORAL
Status: DISCONTINUED | OUTPATIENT
Start: 2021-03-30 | End: 2021-03-31 | Stop reason: HOSPADM

## 2021-03-30 RX ORDER — CHLORTHALIDONE 25 MG/1
50 TABLET ORAL DAILY
Status: DISCONTINUED | OUTPATIENT
Start: 2021-03-30 | End: 2021-03-31 | Stop reason: HOSPADM

## 2021-03-30 RX ORDER — VALSARTAN 80 MG/1
160 TABLET ORAL 2 TIMES DAILY
Status: DISCONTINUED | OUTPATIENT
Start: 2021-03-30 | End: 2021-03-31 | Stop reason: HOSPADM

## 2021-03-30 RX ORDER — TALC
9 POWDER (GRAM) TOPICAL NIGHTLY PRN
Status: DISCONTINUED | OUTPATIENT
Start: 2021-03-30 | End: 2021-03-31 | Stop reason: HOSPADM

## 2021-03-30 RX ORDER — HYDRALAZINE HYDROCHLORIDE 25 MG/1
50 TABLET, FILM COATED ORAL 3 TIMES DAILY
Status: DISCONTINUED | OUTPATIENT
Start: 2021-03-30 | End: 2021-03-30

## 2021-03-30 RX ORDER — HYDROCODONE BITARTRATE AND ACETAMINOPHEN 5; 325 MG/1; MG/1
1 TABLET ORAL EVERY 6 HOURS PRN
Status: DISCONTINUED | OUTPATIENT
Start: 2021-03-30 | End: 2021-03-31 | Stop reason: HOSPADM

## 2021-03-30 RX ORDER — GLUCAGON 1 MG
1 KIT INJECTION
Status: DISCONTINUED | OUTPATIENT
Start: 2021-03-30 | End: 2021-03-31 | Stop reason: HOSPADM

## 2021-03-30 RX ORDER — HYDRALAZINE HYDROCHLORIDE 25 MG/1
100 TABLET, FILM COATED ORAL 3 TIMES DAILY
Status: DISCONTINUED | OUTPATIENT
Start: 2021-03-30 | End: 2021-03-31 | Stop reason: HOSPADM

## 2021-03-30 RX ORDER — LABETALOL HYDROCHLORIDE 5 MG/ML
10 INJECTION, SOLUTION INTRAVENOUS EVERY 6 HOURS PRN
Status: DISCONTINUED | OUTPATIENT
Start: 2021-03-30 | End: 2021-03-31 | Stop reason: HOSPADM

## 2021-03-30 RX ORDER — SPIRONOLACTONE 25 MG/1
25 TABLET ORAL DAILY
Status: DISCONTINUED | OUTPATIENT
Start: 2021-03-30 | End: 2021-03-31 | Stop reason: HOSPADM

## 2021-03-30 RX ORDER — METOPROLOL SUCCINATE 50 MG/1
200 TABLET, EXTENDED RELEASE ORAL DAILY
Status: DISCONTINUED | OUTPATIENT
Start: 2021-03-30 | End: 2021-03-31 | Stop reason: HOSPADM

## 2021-03-30 RX ORDER — SODIUM CHLORIDE 0.9 % (FLUSH) 0.9 %
10 SYRINGE (ML) INJECTION EVERY 6 HOURS PRN
Status: DISCONTINUED | OUTPATIENT
Start: 2021-03-30 | End: 2021-03-31 | Stop reason: HOSPADM

## 2021-03-30 RX ORDER — AMLODIPINE BESYLATE 5 MG/1
10 TABLET ORAL DAILY
Status: DISCONTINUED | OUTPATIENT
Start: 2021-03-30 | End: 2021-03-31 | Stop reason: HOSPADM

## 2021-03-30 RX ORDER — ONDANSETRON 4 MG/1
8 TABLET, ORALLY DISINTEGRATING ORAL EVERY 8 HOURS PRN
Status: DISCONTINUED | OUTPATIENT
Start: 2021-03-30 | End: 2021-03-31 | Stop reason: HOSPADM

## 2021-03-30 RX ORDER — ACETAMINOPHEN 325 MG/1
650 TABLET ORAL EVERY 4 HOURS PRN
Status: DISCONTINUED | OUTPATIENT
Start: 2021-03-30 | End: 2021-03-31 | Stop reason: HOSPADM

## 2021-03-30 RX ADMIN — LABETALOL HYDROCHLORIDE 10 MG: 5 INJECTION, SOLUTION INTRAVENOUS at 05:03

## 2021-03-30 RX ADMIN — INSULIN DETEMIR 15 UNITS: 100 INJECTION, SOLUTION SUBCUTANEOUS at 10:03

## 2021-03-30 RX ADMIN — NICARDIPINE HYDROCHLORIDE 2.5 MG/HR: 0.2 INJECTION INTRAVENOUS at 06:03

## 2021-03-30 RX ADMIN — SPIRONOLACTONE 25 MG: 25 TABLET ORAL at 09:03

## 2021-03-30 RX ADMIN — CHLORTHALIDONE 50 MG: 25 TABLET ORAL at 09:03

## 2021-03-30 RX ADMIN — VALSARTAN 160 MG: 80 TABLET ORAL at 04:03

## 2021-03-30 RX ADMIN — METOPROLOL SUCCINATE 200 MG: 50 TABLET, FILM COATED, EXTENDED RELEASE ORAL at 09:03

## 2021-03-30 RX ADMIN — HUMAN INSULIN 8 UNITS: 100 INJECTION, SOLUTION SUBCUTANEOUS at 01:03

## 2021-03-30 RX ADMIN — AMLODIPINE BESYLATE 10 MG: 5 TABLET ORAL at 04:03

## 2021-03-30 RX ADMIN — LABETALOL HYDROCHLORIDE 10 MG: 5 INJECTION, SOLUTION INTRAVENOUS at 11:03

## 2021-03-30 RX ADMIN — HYDRALAZINE HYDROCHLORIDE 50 MG: 25 TABLET, FILM COATED ORAL at 09:03

## 2021-03-30 RX ADMIN — HYDRALAZINE HYDROCHLORIDE 50 MG: 25 TABLET, FILM COATED ORAL at 02:03

## 2021-03-30 RX ADMIN — HYDRALAZINE HYDROCHLORIDE 100 MG: 25 TABLET, FILM COATED ORAL at 08:03

## 2021-03-30 RX ADMIN — HUMAN INSULIN 10 UNITS: 100 INJECTION, SOLUTION SUBCUTANEOUS at 09:03

## 2021-03-30 RX ADMIN — CLOPIDOGREL BISULFATE 75 MG: 75 TABLET, FILM COATED ORAL at 09:03

## 2021-03-31 VITALS
TEMPERATURE: 98 F | DIASTOLIC BLOOD PRESSURE: 76 MMHG | HEART RATE: 72 BPM | BODY MASS INDEX: 26.11 KG/M2 | RESPIRATION RATE: 15 BRPM | OXYGEN SATURATION: 95 % | SYSTOLIC BLOOD PRESSURE: 155 MMHG | WEIGHT: 129.5 LBS | HEIGHT: 59 IN

## 2021-03-31 PROBLEM — I16.0 HYPERTENSIVE URGENCY: Status: RESOLVED | Noted: 2021-03-31 | Resolved: 2021-03-31

## 2021-03-31 PROBLEM — I16.0 HYPERTENSIVE URGENCY: Status: ACTIVE | Noted: 2021-03-31

## 2021-03-31 LAB
ALBUMIN SERPL BCP-MCNC: 3.8 G/DL (ref 3.5–5.2)
ALP SERPL-CCNC: 74 U/L (ref 55–135)
ALT SERPL W/O P-5'-P-CCNC: 18 U/L (ref 10–44)
ANION GAP SERPL CALC-SCNC: 13 MMOL/L (ref 8–16)
AST SERPL-CCNC: 17 U/L (ref 10–40)
BASOPHILS # BLD AUTO: 0.07 K/UL (ref 0–0.2)
BASOPHILS NFR BLD: 0.5 % (ref 0–1.9)
BILIRUB SERPL-MCNC: 1 MG/DL (ref 0.1–1)
BUN SERPL-MCNC: 25 MG/DL (ref 8–23)
CALCIUM SERPL-MCNC: 9.3 MG/DL (ref 8.7–10.5)
CHLORIDE SERPL-SCNC: 101 MMOL/L (ref 95–110)
CO2 SERPL-SCNC: 21 MMOL/L (ref 23–29)
CREAT SERPL-MCNC: 1.2 MG/DL (ref 0.5–1.4)
DIFFERENTIAL METHOD: ABNORMAL
EOSINOPHIL # BLD AUTO: 0.1 K/UL (ref 0–0.5)
EOSINOPHIL NFR BLD: 0.9 % (ref 0–8)
ERYTHROCYTE [DISTWIDTH] IN BLOOD BY AUTOMATED COUNT: 14.6 % (ref 11.5–14.5)
EST. GFR  (AFRICAN AMERICAN): 55.6 ML/MIN/1.73 M^2
EST. GFR  (NON AFRICAN AMERICAN): 48.2 ML/MIN/1.73 M^2
ESTIMATED AVG GLUCOSE: 220 MG/DL (ref 68–131)
GLUCOSE SERPL-MCNC: 211 MG/DL (ref 70–110)
GLUCOSE SERPL-MCNC: 236 MG/DL (ref 70–110)
HBA1C MFR BLD: 9.3 % (ref 4.5–6.2)
HCT VFR BLD AUTO: 36.5 % (ref 37–48.5)
HGB BLD-MCNC: 12.8 G/DL (ref 12–16)
IMM GRANULOCYTES # BLD AUTO: 0.09 K/UL (ref 0–0.04)
IMM GRANULOCYTES NFR BLD AUTO: 0.7 % (ref 0–0.5)
LYMPHOCYTES # BLD AUTO: 2.5 K/UL (ref 1–4.8)
LYMPHOCYTES NFR BLD: 19.2 % (ref 18–48)
MAGNESIUM SERPL-MCNC: 1.9 MG/DL (ref 1.6–2.6)
MCH RBC QN AUTO: 27.3 PG (ref 27–31)
MCHC RBC AUTO-ENTMCNC: 35.1 G/DL (ref 32–36)
MCV RBC AUTO: 78 FL (ref 82–98)
MONOCYTES # BLD AUTO: 1.2 K/UL (ref 0.3–1)
MONOCYTES NFR BLD: 9 % (ref 4–15)
NEUTROPHILS # BLD AUTO: 9 K/UL (ref 1.8–7.7)
NEUTROPHILS NFR BLD: 69.7 % (ref 38–73)
NRBC BLD-RTO: 0 /100 WBC
PLATELET # BLD AUTO: 411 K/UL (ref 150–450)
PMV BLD AUTO: 10.4 FL (ref 9.2–12.9)
POTASSIUM SERPL-SCNC: 4.2 MMOL/L (ref 3.5–5.1)
PROT SERPL-MCNC: 7.8 G/DL (ref 6–8.4)
RBC # BLD AUTO: 4.69 M/UL (ref 4–5.4)
SODIUM SERPL-SCNC: 135 MMOL/L (ref 136–145)
WBC # BLD AUTO: 12.83 K/UL (ref 3.9–12.7)

## 2021-03-31 PROCEDURE — 83735 ASSAY OF MAGNESIUM: CPT | Performed by: INTERNAL MEDICINE

## 2021-03-31 PROCEDURE — 83036 HEMOGLOBIN GLYCOSYLATED A1C: CPT | Performed by: INTERNAL MEDICINE

## 2021-03-31 PROCEDURE — 96372 THER/PROPH/DIAG INJ SC/IM: CPT | Mod: 59

## 2021-03-31 PROCEDURE — 96366 THER/PROPH/DIAG IV INF ADDON: CPT

## 2021-03-31 PROCEDURE — 25000003 PHARM REV CODE 250: Performed by: INTERNAL MEDICINE

## 2021-03-31 PROCEDURE — 94761 N-INVAS EAR/PLS OXIMETRY MLT: CPT

## 2021-03-31 PROCEDURE — 63600175 PHARM REV CODE 636 W HCPCS: Mod: GZ | Performed by: INTERNAL MEDICINE

## 2021-03-31 PROCEDURE — 36415 COLL VENOUS BLD VENIPUNCTURE: CPT | Performed by: INTERNAL MEDICINE

## 2021-03-31 PROCEDURE — 80053 COMPREHEN METABOLIC PANEL: CPT | Performed by: INTERNAL MEDICINE

## 2021-03-31 PROCEDURE — 85025 COMPLETE CBC W/AUTO DIFF WBC: CPT | Performed by: INTERNAL MEDICINE

## 2021-03-31 PROCEDURE — 99900035 HC TECH TIME PER 15 MIN (STAT)

## 2021-03-31 PROCEDURE — G0378 HOSPITAL OBSERVATION PER HR: HCPCS

## 2021-03-31 RX ORDER — HYDRALAZINE HYDROCHLORIDE 100 MG/1
100 TABLET, FILM COATED ORAL 3 TIMES DAILY
Qty: 90 TABLET | Refills: 11 | Status: SHIPPED | OUTPATIENT
Start: 2021-03-31 | End: 2021-10-01 | Stop reason: SDUPTHER

## 2021-03-31 RX ORDER — CLONIDINE HYDROCHLORIDE 0.1 MG/1
0.1 TABLET ORAL EVERY 8 HOURS PRN
Qty: 90 TABLET | Refills: 0 | Status: ON HOLD | OUTPATIENT
Start: 2021-03-31 | End: 2021-11-24 | Stop reason: HOSPADM

## 2021-03-31 RX ADMIN — AMLODIPINE BESYLATE 10 MG: 5 TABLET ORAL at 08:03

## 2021-03-31 RX ADMIN — HUMAN INSULIN 4 UNITS: 100 INJECTION, SOLUTION SUBCUTANEOUS at 08:03

## 2021-03-31 RX ADMIN — CLOPIDOGREL BISULFATE 75 MG: 75 TABLET, FILM COATED ORAL at 08:03

## 2021-03-31 RX ADMIN — METOPROLOL SUCCINATE 200 MG: 50 TABLET, FILM COATED, EXTENDED RELEASE ORAL at 08:03

## 2021-03-31 RX ADMIN — INSULIN DETEMIR 15 UNITS: 100 INJECTION, SOLUTION SUBCUTANEOUS at 08:03

## 2021-03-31 RX ADMIN — VALSARTAN 160 MG: 80 TABLET ORAL at 08:03

## 2021-03-31 RX ADMIN — HYDRALAZINE HYDROCHLORIDE 100 MG: 25 TABLET, FILM COATED ORAL at 08:03

## 2021-03-31 RX ADMIN — SPIRONOLACTONE 25 MG: 25 TABLET ORAL at 08:03

## 2021-03-31 RX ADMIN — CHLORTHALIDONE 50 MG: 25 TABLET ORAL at 08:03

## 2021-04-05 ENCOUNTER — PATIENT MESSAGE (OUTPATIENT)
Dept: ADMINISTRATIVE | Facility: HOSPITAL | Age: 61
End: 2021-04-05

## 2021-04-29 ENCOUNTER — TELEPHONE (OUTPATIENT)
Dept: ADMINISTRATIVE | Facility: HOSPITAL | Age: 61
End: 2021-04-29

## 2021-04-29 ENCOUNTER — PATIENT MESSAGE (OUTPATIENT)
Dept: RESEARCH | Facility: HOSPITAL | Age: 61
End: 2021-04-29

## 2021-06-02 ENCOUNTER — PATIENT OUTREACH (OUTPATIENT)
Dept: ADMINISTRATIVE | Facility: OTHER | Age: 61
End: 2021-06-02

## 2021-06-02 ENCOUNTER — OFFICE VISIT (OUTPATIENT)
Dept: ENDOCRINOLOGY | Facility: CLINIC | Age: 61
End: 2021-06-02
Payer: MEDICARE

## 2021-06-02 ENCOUNTER — LAB VISIT (OUTPATIENT)
Dept: LAB | Facility: HOSPITAL | Age: 61
End: 2021-06-02
Attending: NURSE PRACTITIONER
Payer: MEDICARE

## 2021-06-02 VITALS
WEIGHT: 127 LBS | SYSTOLIC BLOOD PRESSURE: 120 MMHG | HEIGHT: 59 IN | DIASTOLIC BLOOD PRESSURE: 70 MMHG | BODY MASS INDEX: 25.6 KG/M2 | TEMPERATURE: 98 F | OXYGEN SATURATION: 96 % | HEART RATE: 73 BPM

## 2021-06-02 DIAGNOSIS — I63.9 CEREBROVASCULAR ACCIDENT (CVA) OF LEFT PONTINE STRUCTURE: ICD-10-CM

## 2021-06-02 DIAGNOSIS — I15.2 HYPERTENSION ASSOCIATED WITH DIABETES: ICD-10-CM

## 2021-06-02 DIAGNOSIS — E11.59 HYPERTENSION ASSOCIATED WITH DIABETES: ICD-10-CM

## 2021-06-02 DIAGNOSIS — Z79.4 TYPE 2 DIABETES MELLITUS WITH BOTH EYES AFFECTED BY MODERATE NONPROLIFERATIVE RETINOPATHY WITHOUT MACULAR EDEMA, WITH LONG-TERM CURRENT USE OF INSULIN: ICD-10-CM

## 2021-06-02 DIAGNOSIS — E11.65 TYPE 2 DIABETES MELLITUS WITH HYPERGLYCEMIA, WITH LONG-TERM CURRENT USE OF INSULIN: ICD-10-CM

## 2021-06-02 DIAGNOSIS — Z79.4 TYPE 2 DIABETES MELLITUS WITH BOTH EYES AFFECTED BY MODERATE NONPROLIFERATIVE RETINOPATHY WITHOUT MACULAR EDEMA, WITH LONG-TERM CURRENT USE OF INSULIN: Primary | ICD-10-CM

## 2021-06-02 DIAGNOSIS — E55.9 VITAMIN D DEFICIENCY: ICD-10-CM

## 2021-06-02 DIAGNOSIS — Z79.4 TYPE 2 DIABETES MELLITUS WITH DIABETIC NEPHROPATHY, WITH LONG-TERM CURRENT USE OF INSULIN: ICD-10-CM

## 2021-06-02 DIAGNOSIS — E11.3393 TYPE 2 DIABETES MELLITUS WITH BOTH EYES AFFECTED BY MODERATE NONPROLIFERATIVE RETINOPATHY WITHOUT MACULAR EDEMA, WITH LONG-TERM CURRENT USE OF INSULIN: ICD-10-CM

## 2021-06-02 DIAGNOSIS — Z79.4 TYPE 2 DIABETES MELLITUS WITH HYPERGLYCEMIA, WITH LONG-TERM CURRENT USE OF INSULIN: ICD-10-CM

## 2021-06-02 DIAGNOSIS — E11.21 TYPE 2 DIABETES MELLITUS WITH DIABETIC NEPHROPATHY, WITH LONG-TERM CURRENT USE OF INSULIN: ICD-10-CM

## 2021-06-02 DIAGNOSIS — E11.3393 TYPE 2 DIABETES MELLITUS WITH BOTH EYES AFFECTED BY MODERATE NONPROLIFERATIVE RETINOPATHY WITHOUT MACULAR EDEMA, WITH LONG-TERM CURRENT USE OF INSULIN: Primary | ICD-10-CM

## 2021-06-02 DIAGNOSIS — E78.2 MIXED HYPERLIPIDEMIA: ICD-10-CM

## 2021-06-02 PROCEDURE — 99214 OFFICE O/P EST MOD 30 MIN: CPT | Mod: S$GLB,,, | Performed by: NURSE PRACTITIONER

## 2021-06-02 PROCEDURE — 36415 COLL VENOUS BLD VENIPUNCTURE: CPT | Mod: PO | Performed by: NURSE PRACTITIONER

## 2021-06-02 PROCEDURE — 1125F AMNT PAIN NOTED PAIN PRSNT: CPT | Mod: S$GLB,,, | Performed by: NURSE PRACTITIONER

## 2021-06-02 PROCEDURE — 3008F PR BODY MASS INDEX (BMI) DOCUMENTED: ICD-10-PCS | Mod: CPTII,S$GLB,, | Performed by: NURSE PRACTITIONER

## 2021-06-02 PROCEDURE — 3046F PR MOST RECENT HEMOGLOBIN A1C LEVEL > 9.0%: ICD-10-PCS | Mod: CPTII,S$GLB,, | Performed by: NURSE PRACTITIONER

## 2021-06-02 PROCEDURE — 99214 PR OFFICE/OUTPT VISIT, EST, LEVL IV, 30-39 MIN: ICD-10-PCS | Mod: S$GLB,,, | Performed by: NURSE PRACTITIONER

## 2021-06-02 PROCEDURE — 99999 PR PBB SHADOW E&M-EST. PATIENT-LVL IV: CPT | Mod: PBBFAC,,, | Performed by: NURSE PRACTITIONER

## 2021-06-02 PROCEDURE — 1125F PR PAIN SEVERITY QUANTIFIED, PAIN PRESENT: ICD-10-PCS | Mod: S$GLB,,, | Performed by: NURSE PRACTITIONER

## 2021-06-02 PROCEDURE — 3046F HEMOGLOBIN A1C LEVEL >9.0%: CPT | Mod: CPTII,S$GLB,, | Performed by: NURSE PRACTITIONER

## 2021-06-02 PROCEDURE — 83036 HEMOGLOBIN GLYCOSYLATED A1C: CPT | Performed by: NURSE PRACTITIONER

## 2021-06-02 PROCEDURE — 99999 PR PBB SHADOW E&M-EST. PATIENT-LVL IV: ICD-10-PCS | Mod: PBBFAC,,, | Performed by: NURSE PRACTITIONER

## 2021-06-02 PROCEDURE — 3008F BODY MASS INDEX DOCD: CPT | Mod: CPTII,S$GLB,, | Performed by: NURSE PRACTITIONER

## 2021-06-02 RX ORDER — INSULIN ASPART 100 [IU]/ML
INJECTION, SOLUTION INTRAVENOUS; SUBCUTANEOUS
Qty: 45 ML | Refills: 12 | Status: SHIPPED | OUTPATIENT
Start: 2021-06-02 | End: 2021-10-06 | Stop reason: SDUPTHER

## 2021-06-02 RX ORDER — PEN NEEDLE, DIABETIC 30 GX3/16"
NEEDLE, DISPOSABLE MISCELLANEOUS
Qty: 400 EACH | Refills: 4 | Status: SHIPPED | OUTPATIENT
Start: 2021-06-02 | End: 2022-11-09 | Stop reason: SDUPTHER

## 2021-06-02 RX ORDER — NAPROXEN SODIUM 220 MG/1
81 TABLET, FILM COATED ORAL DAILY
Qty: 90 TABLET | Refills: 3 | Status: SHIPPED | OUTPATIENT
Start: 2021-06-02 | End: 2022-05-20

## 2021-06-02 RX ORDER — INSULIN DEGLUDEC 100 U/ML
INJECTION, SOLUTION SUBCUTANEOUS
Qty: 15 PEN | Refills: 4 | Status: SHIPPED | OUTPATIENT
Start: 2021-06-02 | End: 2021-10-03

## 2021-06-03 ENCOUNTER — TELEPHONE (OUTPATIENT)
Dept: NEPHROLOGY | Facility: CLINIC | Age: 61
End: 2021-06-03

## 2021-06-03 LAB
ESTIMATED AVG GLUCOSE: 260 MG/DL (ref 68–131)
HBA1C MFR BLD: 10.7 % (ref 4–5.6)

## 2021-06-11 ENCOUNTER — TELEPHONE (OUTPATIENT)
Dept: FAMILY MEDICINE | Facility: CLINIC | Age: 61
End: 2021-06-11

## 2021-06-17 ENCOUNTER — TELEPHONE (OUTPATIENT)
Dept: FAMILY MEDICINE | Facility: CLINIC | Age: 61
End: 2021-06-17

## 2021-06-24 DIAGNOSIS — M47.892 OTHER SPONDYLOSIS, CERVICAL REGION: ICD-10-CM

## 2021-06-24 DIAGNOSIS — M54.2 CERVICALGIA: ICD-10-CM

## 2021-06-24 DIAGNOSIS — M47.22 CERVICAL SPONDYLOSIS WITH RADICULOPATHY: Primary | ICD-10-CM

## 2021-06-30 ENCOUNTER — HOSPITAL ENCOUNTER (EMERGENCY)
Facility: HOSPITAL | Age: 61
Discharge: HOME | End: 2021-07-01
Attending: EMERGENCY MEDICINE
Payer: MEDICARE

## 2021-06-30 DIAGNOSIS — I10 UNCONTROLLED HYPERTENSION: ICD-10-CM
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
## 2021-06-30 DIAGNOSIS — T14.90XA TRAUMA: ICD-10-CM

## 2021-06-30 DIAGNOSIS — R07.9 CHEST PAIN: ICD-10-CM

## 2021-06-30 LAB
ALBUMIN SERPL BCP-MCNC: 4.1 G/DL (ref 3.5–5.2)
ALP SERPL-CCNC: 73 U/L (ref 55–135)
ALT SERPL W/O P-5'-P-CCNC: 19 U/L (ref 10–44)
ANION GAP SERPL CALC-SCNC: 11 MMOL/L (ref 8–16)
AST SERPL-CCNC: 25 U/L (ref 10–40)
BASOPHILS # BLD AUTO: 0.06 K/UL (ref 0–0.2)
BASOPHILS NFR BLD: 0.5 % (ref 0–1.9)
BILIRUB SERPL-MCNC: 0.9 MG/DL (ref 0.1–1)
BNP SERPL-MCNC: 49 PG/ML (ref 0–99)
BUN SERPL-MCNC: 13 MG/DL (ref 8–23)
CALCIUM SERPL-MCNC: 9 MG/DL (ref 8.7–10.5)
CHLORIDE SERPL-SCNC: 105 MMOL/L (ref 95–110)
CO2 SERPL-SCNC: 24 MMOL/L (ref 23–29)
CREAT SERPL-MCNC: 1 MG/DL (ref 0.5–1.4)
DIFFERENTIAL METHOD: ABNORMAL
EOSINOPHIL # BLD AUTO: 0.1 K/UL (ref 0–0.5)
EOSINOPHIL NFR BLD: 0.6 % (ref 0–8)
ERYTHROCYTE [DISTWIDTH] IN BLOOD BY AUTOMATED COUNT: 14.2 % (ref 11.5–14.5)
EST. GFR  (AFRICAN AMERICAN): >60 ML/MIN/1.73 M^2
EST. GFR  (NON AFRICAN AMERICAN): >60 ML/MIN/1.73 M^2
GLUCOSE SERPL-MCNC: 136 MG/DL (ref 70–110)
GLUCOSE SERPL-MCNC: 138 MG/DL (ref 70–110)
HCT VFR BLD AUTO: 34.2 % (ref 37–48.5)
HGB BLD-MCNC: 11.9 G/DL (ref 12–16)
IMM GRANULOCYTES # BLD AUTO: 0.05 K/UL (ref 0–0.04)
IMM GRANULOCYTES NFR BLD AUTO: 0.4 % (ref 0–0.5)
LYMPHOCYTES # BLD AUTO: 0.3 K/UL (ref 1–4.8)
LYMPHOCYTES NFR BLD: 2.4 % (ref 18–48)
MCH RBC QN AUTO: 27.9 PG (ref 27–31)
MCHC RBC AUTO-ENTMCNC: 34.8 G/DL (ref 32–36)
MCV RBC AUTO: 80 FL (ref 82–98)
MONOCYTES # BLD AUTO: 1.2 K/UL (ref 0.3–1)
MONOCYTES NFR BLD: 9.3 % (ref 4–15)
NEUTROPHILS # BLD AUTO: 11.4 K/UL (ref 1.8–7.7)
NEUTROPHILS NFR BLD: 86.8 % (ref 38–73)
NRBC BLD-RTO: 0 /100 WBC
PLATELET # BLD AUTO: 349 K/UL (ref 150–450)
PMV BLD AUTO: 10.6 FL (ref 9.2–12.9)
POTASSIUM SERPL-SCNC: 3.6 MMOL/L (ref 3.5–5.1)
PROT SERPL-MCNC: 7.9 G/DL (ref 6–8.4)
RBC # BLD AUTO: 4.26 M/UL (ref 4–5.4)
SODIUM SERPL-SCNC: 140 MMOL/L (ref 136–145)
TROPONIN I SERPL DL<=0.01 NG/ML-MCNC: 0.04 NG/ML
WBC # BLD AUTO: 13.11 K/UL (ref 3.9–12.7)

## 2021-06-30 PROCEDURE — 93010 ELECTROCARDIOGRAM REPORT: CPT | Mod: ,,, | Performed by: INTERNAL MEDICINE

## 2021-06-30 PROCEDURE — 93010 EKG 12-LEAD: ICD-10-PCS | Mod: ,,, | Performed by: INTERNAL MEDICINE

## 2021-06-30 PROCEDURE — 84484 ASSAY OF TROPONIN QUANT: CPT | Performed by: EMERGENCY MEDICINE

## 2021-06-30 PROCEDURE — 83880 ASSAY OF NATRIURETIC PEPTIDE: CPT | Performed by: EMERGENCY MEDICINE

## 2021-06-30 PROCEDURE — 81001 URINALYSIS AUTO W/SCOPE: CPT | Performed by: EMERGENCY MEDICINE

## 2021-06-30 PROCEDURE — 85025 COMPLETE CBC W/AUTO DIFF WBC: CPT | Performed by: EMERGENCY MEDICINE

## 2021-06-30 PROCEDURE — 93005 ELECTROCARDIOGRAM TRACING: CPT | Performed by: INTERNAL MEDICINE

## 2021-06-30 PROCEDURE — 99285 EMERGENCY DEPT VISIT HI MDM: CPT | Mod: 25

## 2021-06-30 PROCEDURE — 80053 COMPREHEN METABOLIC PANEL: CPT | Performed by: EMERGENCY MEDICINE

## 2021-07-01 VITALS
TEMPERATURE: 98 F | BODY MASS INDEX: 26.21 KG/M2 | DIASTOLIC BLOOD PRESSURE: 121 MMHG | HEIGHT: 59 IN | SYSTOLIC BLOOD PRESSURE: 231 MMHG | WEIGHT: 130 LBS | OXYGEN SATURATION: 98 % | RESPIRATION RATE: 32 BRPM | HEART RATE: 104 BPM

## 2021-07-01 LAB
BACTERIA #/AREA URNS HPF: NEGATIVE /HPF
BILIRUB UR QL STRIP: NEGATIVE
CLARITY UR: CLEAR
COLOR UR: YELLOW
GLUCOSE UR QL STRIP: NEGATIVE
HGB UR QL STRIP: NEGATIVE
HYALINE CASTS #/AREA URNS LPF: 3 /LPF
KETONES UR QL STRIP: NEGATIVE
LEUKOCYTE ESTERASE UR QL STRIP: NEGATIVE
MICROSCOPIC COMMENT: ABNORMAL
NITRITE UR QL STRIP: NEGATIVE
PH UR STRIP: 7 [PH] (ref 5–8)
PROT UR QL STRIP: ABNORMAL
RBC #/AREA URNS HPF: 3 /HPF (ref 0–4)
SP GR UR STRIP: 1.01 (ref 1–1.03)
SQUAMOUS #/AREA URNS HPF: 1 /HPF
URN SPEC COLLECT METH UR: ABNORMAL
UROBILINOGEN UR STRIP-ACNC: NEGATIVE EU/DL
WBC #/AREA URNS HPF: 0 /HPF (ref 0–5)

## 2021-07-02 ENCOUNTER — HOSPITAL ENCOUNTER (OUTPATIENT)
Facility: HOSPITAL | Age: 61
Discharge: HOME | End: 2021-07-04
Attending: EMERGENCY MEDICINE | Admitting: HOSPITALIST
Payer: MEDICARE

## 2021-07-02 ENCOUNTER — CLINICAL SUPPORT (OUTPATIENT)
Dept: CARDIOLOGY | Facility: HOSPITAL | Age: 61
End: 2021-07-02
Attending: HOSPITALIST
Payer: MEDICARE

## 2021-07-02 DIAGNOSIS — I10 UNCONTROLLED HYPERTENSION: ICD-10-CM

## 2021-07-02 DIAGNOSIS — U07.1 COVID-19: ICD-10-CM

## 2021-07-02 DIAGNOSIS — R53.1 RIGHT SIDED WEAKNESS: ICD-10-CM

## 2021-07-02 DIAGNOSIS — Z79.4 TYPE 2 DIABETES MELLITUS WITH BOTH EYES AFFECTED BY MODERATE NONPROLIFERATIVE RETINOPATHY WITHOUT MACULAR EDEMA, WITH LONG-TERM CURRENT USE OF INSULIN: ICD-10-CM

## 2021-07-02 DIAGNOSIS — R29.6 FREQUENT FALLS: Primary | ICD-10-CM

## 2021-07-02 DIAGNOSIS — I63.9 STROKE: ICD-10-CM

## 2021-07-02 DIAGNOSIS — E11.3393 TYPE 2 DIABETES MELLITUS WITH BOTH EYES AFFECTED BY MODERATE NONPROLIFERATIVE RETINOPATHY WITHOUT MACULAR EDEMA, WITH LONG-TERM CURRENT USE OF INSULIN: ICD-10-CM

## 2021-07-02 DIAGNOSIS — I16.0 HYPERTENSIVE URGENCY, MALIGNANT: ICD-10-CM

## 2021-07-02 DIAGNOSIS — Z91.148 NONCOMPLIANCE WITH MEDICATIONS: ICD-10-CM

## 2021-07-02 DIAGNOSIS — I63.9 CEREBROVASCULAR ACCIDENT (CVA) OF LEFT PONTINE STRUCTURE: ICD-10-CM

## 2021-07-02 DIAGNOSIS — R79.89 TROPONIN LEVEL ELEVATED: ICD-10-CM

## 2021-07-02 DIAGNOSIS — R07.9 CHEST PAIN: ICD-10-CM

## 2021-07-02 DIAGNOSIS — I10 ESSENTIAL HYPERTENSION: ICD-10-CM

## 2021-07-02 DIAGNOSIS — R79.89 ELEVATED TROPONIN: ICD-10-CM

## 2021-07-02 PROBLEM — I16.9 HYPERTENSIVE CRISIS: Status: ACTIVE | Noted: 2021-07-02

## 2021-07-02 PROBLEM — N17.9 AKI (ACUTE KIDNEY INJURY): Status: ACTIVE | Noted: 2021-07-02

## 2021-07-02 LAB
ALBUMIN SERPL BCP-MCNC: 3.7 G/DL (ref 3.5–5.2)
ALP SERPL-CCNC: 80 U/L (ref 55–135)
ALT SERPL W/O P-5'-P-CCNC: 26 U/L (ref 10–44)
AMORPH CRY URNS QL MICRO: ABNORMAL
ANION GAP SERPL CALC-SCNC: 12 MMOL/L (ref 8–16)
AORTIC ROOT ANNULUS: 2.56 CM
AORTIC VALVE CUSP SEPERATION: 1.78 CM
AST SERPL-CCNC: 33 U/L (ref 10–40)
AV INDEX (PROSTH): 0.98
AV MEAN GRADIENT: 3 MMHG
AV PEAK GRADIENT: 5 MMHG
AV VALVE AREA: 3.21 CM2
AV VELOCITY RATIO: 76.11
BACTERIA #/AREA URNS HPF: ABNORMAL /HPF
BASOPHILS # BLD AUTO: 0.02 K/UL (ref 0–0.2)
BASOPHILS NFR BLD: 0.3 % (ref 0–1.9)
BILIRUB SERPL-MCNC: 1.1 MG/DL (ref 0.1–1)
BILIRUB UR QL STRIP: NEGATIVE
BNP SERPL-MCNC: 40 PG/ML (ref 0–99)
BSA FOR ECHO PROCEDURE: 1.54 M2
BUN SERPL-MCNC: 23 MG/DL (ref 8–23)
CALCIUM SERPL-MCNC: 8.4 MG/DL (ref 8.7–10.5)
CHLORIDE SERPL-SCNC: 101 MMOL/L (ref 95–110)
CHOLEST SERPL-MCNC: 174 MG/DL (ref 120–199)
CHOLEST/HDLC SERPL: 2.4 {RATIO} (ref 2–5)
CK MB SERPL-MCNC: 2.6 NG/ML (ref 0.1–6.5)
CK SERPL-CCNC: 221 U/L (ref 20–180)
CLARITY UR: ABNORMAL
CO2 SERPL-SCNC: 24 MMOL/L (ref 23–29)
COLOR UR: YELLOW
CREAT SERPL-MCNC: 1.3 MG/DL (ref 0.5–1.4)
CRP SERPL-MCNC: 1.97 MG/DL
CV ECHO LV RWT: 0.74 CM
DIFFERENTIAL METHOD: ABNORMAL
DOP CALC AO PEAK VEL: 1.1 M/S
DOP CALC AO VTI: 15.05 CM
DOP CALC LVOT AREA: 3.3 CM2
DOP CALC LVOT DIAMETER: 2.04 CM
DOP CALC LVOT PEAK VEL: 83.72 M/S
DOP CALC LVOT STROKE VOLUME: 48.38 CM3
DOP CALCLVOT PEAK VEL VTI: 14.81 CM
E WAVE DECELERATION TIME: 254.42 MSEC
E/A RATIO: 0.5
E/E' RATIO: 10.8 M/S
ECHO LV POSTERIOR WALL: 1.24 CM (ref 0.6–1.1)
EJECTION FRACTION: 53 %
EOSINOPHIL # BLD AUTO: 0 K/UL (ref 0–0.5)
EOSINOPHIL NFR BLD: 0 % (ref 0–8)
ERYTHROCYTE [DISTWIDTH] IN BLOOD BY AUTOMATED COUNT: 14.4 % (ref 11.5–14.5)
EST. GFR  (AFRICAN AMERICAN): 50.5 ML/MIN/1.73 M^2
EST. GFR  (NON AFRICAN AMERICAN): 43.8 ML/MIN/1.73 M^2
FERRITIN SERPL-MCNC: 660 NG/ML (ref 20–300)
FRACTIONAL SHORTENING: 33 % (ref 28–44)
GLUCOSE SERPL-MCNC: 167 MG/DL (ref 70–110)
GLUCOSE SERPL-MCNC: 183 MG/DL (ref 70–110)
GLUCOSE SERPL-MCNC: 210 MG/DL (ref 70–110)
GLUCOSE SERPL-MCNC: 249 MG/DL (ref 70–110)
GLUCOSE SERPL-MCNC: 294 MG/DL (ref 70–110)
GLUCOSE UR QL STRIP: ABNORMAL
HCT VFR BLD AUTO: 37.6 % (ref 37–48.5)
HDLC SERPL-MCNC: 72 MG/DL (ref 40–75)
HDLC SERPL: 41.4 % (ref 20–50)
HGB BLD-MCNC: 13.2 G/DL (ref 12–16)
HGB UR QL STRIP: ABNORMAL
HYALINE CASTS #/AREA URNS LPF: 15 /LPF
IMM GRANULOCYTES # BLD AUTO: 0.03 K/UL (ref 0–0.04)
IMM GRANULOCYTES NFR BLD AUTO: 0.4 % (ref 0–0.5)
INTERVENTRICULAR SEPTUM: 1.18 CM (ref 0.6–1.1)
KETONES UR QL STRIP: NEGATIVE
LDH SERPL L TO P-CCNC: 291 U/L (ref 110–260)
LDLC SERPL CALC-MCNC: 92.2 MG/DL (ref 63–159)
LEFT ATRIUM SIZE: 3.28 CM
LEFT INTERNAL DIMENSION IN SYSTOLE: 2.22 CM (ref 2.1–4)
LEFT VENTRICLE DIASTOLIC VOLUME INDEX: 54.8 ML/M2
LEFT VENTRICLE DIASTOLIC VOLUME: 82.75 ML
LEFT VENTRICLE MASS INDEX: 85 G/M2
LEFT VENTRICLE SYSTOLIC VOLUME INDEX: 26.1 ML/M2
LEFT VENTRICLE SYSTOLIC VOLUME: 39.39 ML
LEFT VENTRICULAR INTERNAL DIMENSION IN DIASTOLE: 3.33 CM (ref 3.5–6)
LEFT VENTRICULAR MASS: 128.05 G
LEUKOCYTE ESTERASE UR QL STRIP: NEGATIVE
LV LATERAL E/E' RATIO: 10.8 M/S
LV SEPTAL E/E' RATIO: 10.8 M/S
LYMPHOCYTES # BLD AUTO: 1.2 K/UL (ref 1–4.8)
LYMPHOCYTES NFR BLD: 17.4 % (ref 18–48)
MAGNESIUM SERPL-MCNC: 1.8 MG/DL (ref 1.6–2.6)
MCH RBC QN AUTO: 27.7 PG (ref 27–31)
MCHC RBC AUTO-ENTMCNC: 35.1 G/DL (ref 32–36)
MCV RBC AUTO: 79 FL (ref 82–98)
MICROSCOPIC COMMENT: ABNORMAL
MONOCYTES # BLD AUTO: 1.2 K/UL (ref 0.3–1)
MONOCYTES NFR BLD: 16.4 % (ref 4–15)
MV PEAK A VEL: 1.09 M/S
MV PEAK E VEL: 0.54 M/S
NEUTROPHILS # BLD AUTO: 4.7 K/UL (ref 1.8–7.7)
NEUTROPHILS NFR BLD: 65.5 % (ref 38–73)
NITRITE UR QL STRIP: NEGATIVE
NONHDLC SERPL-MCNC: 102 MG/DL
NRBC BLD-RTO: 0 /100 WBC
PH UR STRIP: 6 [PH] (ref 5–8)
PLATELET # BLD AUTO: 289 K/UL (ref 150–450)
PMV BLD AUTO: 10.3 FL (ref 9.2–12.9)
POTASSIUM SERPL-SCNC: 3.7 MMOL/L (ref 3.5–5.1)
PROT SERPL-MCNC: 7.9 G/DL (ref 6–8.4)
PROT UR QL STRIP: ABNORMAL
PV PEAK VELOCITY: 124.67 CM/S
RA PRESSURE: 3 MMHG
RBC # BLD AUTO: 4.76 M/UL (ref 4–5.4)
RBC #/AREA URNS HPF: 40 /HPF (ref 0–4)
RIGHT VENTRICULAR END-DIASTOLIC DIMENSION: 309 CM
SARS-COV-2 RDRP RESP QL NAA+PROBE: POSITIVE
SODIUM SERPL-SCNC: 137 MMOL/L (ref 136–145)
SP GR UR STRIP: 1.02 (ref 1–1.03)
SQUAMOUS #/AREA URNS HPF: 4 /HPF
TDI LATERAL: 0.05 M/S
TDI SEPTAL: 0.05 M/S
TDI: 0.05 M/S
TRIGL SERPL-MCNC: 49 MG/DL (ref 30–150)
TROPONIN I SERPL DL<=0.01 NG/ML-MCNC: 0.07 NG/ML
TROPONIN I SERPL DL<=0.01 NG/ML-MCNC: 0.08 NG/ML
TROPONIN I SERPL DL<=0.01 NG/ML-MCNC: 0.09 NG/ML
TSH SERPL DL<=0.005 MIU/L-ACNC: 0.52 UIU/ML (ref 0.34–5.6)
URN SPEC COLLECT METH UR: ABNORMAL
UROBILINOGEN UR STRIP-ACNC: NEGATIVE EU/DL
WBC # BLD AUTO: 7.14 K/UL (ref 3.9–12.7)
WBC #/AREA URNS HPF: 4 /HPF (ref 0–5)
YEAST URNS QL MICRO: ABNORMAL

## 2021-07-02 PROCEDURE — 86140 C-REACTIVE PROTEIN: CPT | Performed by: EMERGENCY MEDICINE

## 2021-07-02 PROCEDURE — 83735 ASSAY OF MAGNESIUM: CPT | Performed by: EMERGENCY MEDICINE

## 2021-07-02 PROCEDURE — 63600175 PHARM REV CODE 636 W HCPCS: Performed by: HOSPITALIST

## 2021-07-02 PROCEDURE — G0378 HOSPITAL OBSERVATION PER HR: HCPCS

## 2021-07-02 PROCEDURE — 84484 ASSAY OF TROPONIN QUANT: CPT | Mod: 91 | Performed by: HOSPITALIST

## 2021-07-02 PROCEDURE — 82553 CREATINE MB FRACTION: CPT | Performed by: EMERGENCY MEDICINE

## 2021-07-02 PROCEDURE — U0002 COVID-19 LAB TEST NON-CDC: HCPCS | Performed by: EMERGENCY MEDICINE

## 2021-07-02 PROCEDURE — 93005 ELECTROCARDIOGRAM TRACING: CPT | Performed by: INTERNAL MEDICINE

## 2021-07-02 PROCEDURE — 25000003 PHARM REV CODE 250: Performed by: HOSPITALIST

## 2021-07-02 PROCEDURE — 80061 LIPID PANEL: CPT | Performed by: EMERGENCY MEDICINE

## 2021-07-02 PROCEDURE — 93306 ECHO (CUPID ONLY): ICD-10-PCS | Mod: 26,,, | Performed by: INTERNAL MEDICINE

## 2021-07-02 PROCEDURE — G0378 HOSPITAL OBSERVATION PER HR: HCPCS | Mod: CS

## 2021-07-02 PROCEDURE — 94761 N-INVAS EAR/PLS OXIMETRY MLT: CPT

## 2021-07-02 PROCEDURE — 83880 ASSAY OF NATRIURETIC PEPTIDE: CPT | Performed by: EMERGENCY MEDICINE

## 2021-07-02 PROCEDURE — 99900035 HC TECH TIME PER 15 MIN (STAT)

## 2021-07-02 PROCEDURE — 96376 TX/PRO/DX INJ SAME DRUG ADON: CPT | Mod: 59

## 2021-07-02 PROCEDURE — C9399 UNCLASSIFIED DRUGS OR BIOLOG: HCPCS | Performed by: HOSPITALIST

## 2021-07-02 PROCEDURE — 80053 COMPREHEN METABOLIC PANEL: CPT | Performed by: EMERGENCY MEDICINE

## 2021-07-02 PROCEDURE — 96372 THER/PROPH/DIAG INJ SC/IM: CPT

## 2021-07-02 PROCEDURE — 36415 COLL VENOUS BLD VENIPUNCTURE: CPT | Performed by: HOSPITALIST

## 2021-07-02 PROCEDURE — 96375 TX/PRO/DX INJ NEW DRUG ADDON: CPT

## 2021-07-02 PROCEDURE — 83615 LACTATE (LD) (LDH) ENZYME: CPT | Performed by: EMERGENCY MEDICINE

## 2021-07-02 PROCEDURE — 63600175 PHARM REV CODE 636 W HCPCS: Performed by: EMERGENCY MEDICINE

## 2021-07-02 PROCEDURE — 36415 COLL VENOUS BLD VENIPUNCTURE: CPT | Performed by: EMERGENCY MEDICINE

## 2021-07-02 PROCEDURE — 99900031 HC PATIENT EDUCATION (STAT)

## 2021-07-02 PROCEDURE — 84484 ASSAY OF TROPONIN QUANT: CPT | Performed by: EMERGENCY MEDICINE

## 2021-07-02 PROCEDURE — 85025 COMPLETE CBC W/AUTO DIFF WBC: CPT | Performed by: EMERGENCY MEDICINE

## 2021-07-02 PROCEDURE — 93306 TTE W/DOPPLER COMPLETE: CPT

## 2021-07-02 PROCEDURE — 93010 ELECTROCARDIOGRAM REPORT: CPT | Mod: 76,,, | Performed by: INTERNAL MEDICINE

## 2021-07-02 PROCEDURE — 84443 ASSAY THYROID STIM HORMONE: CPT | Performed by: EMERGENCY MEDICINE

## 2021-07-02 PROCEDURE — 82550 ASSAY OF CK (CPK): CPT | Performed by: EMERGENCY MEDICINE

## 2021-07-02 PROCEDURE — 93010 EKG 12-LEAD: ICD-10-PCS | Mod: ,,, | Performed by: INTERNAL MEDICINE

## 2021-07-02 PROCEDURE — 82728 ASSAY OF FERRITIN: CPT | Performed by: EMERGENCY MEDICINE

## 2021-07-02 PROCEDURE — 93010 ELECTROCARDIOGRAM REPORT: CPT | Mod: ,,, | Performed by: INTERNAL MEDICINE

## 2021-07-02 PROCEDURE — 87086 URINE CULTURE/COLONY COUNT: CPT | Performed by: EMERGENCY MEDICINE

## 2021-07-02 PROCEDURE — 99285 EMERGENCY DEPT VISIT HI MDM: CPT | Mod: 25

## 2021-07-02 PROCEDURE — 93306 TTE W/DOPPLER COMPLETE: CPT | Mod: 26,,, | Performed by: INTERNAL MEDICINE

## 2021-07-02 PROCEDURE — 82962 GLUCOSE BLOOD TEST: CPT

## 2021-07-02 PROCEDURE — 81001 URINALYSIS AUTO W/SCOPE: CPT | Performed by: EMERGENCY MEDICINE

## 2021-07-02 RX ORDER — ALBUTEROL SULFATE 90 UG/1
2 AEROSOL, METERED RESPIRATORY (INHALATION) EVERY 6 HOURS PRN
Status: DISCONTINUED | OUTPATIENT
Start: 2021-07-02 | End: 2021-07-04 | Stop reason: HOSPADM

## 2021-07-02 RX ORDER — SODIUM CHLORIDE 0.9 % (FLUSH) 0.9 %
10 SYRINGE (ML) INJECTION
Status: DISCONTINUED | OUTPATIENT
Start: 2021-07-02 | End: 2021-07-04 | Stop reason: HOSPADM

## 2021-07-02 RX ORDER — MAGNESIUM SULFATE HEPTAHYDRATE 40 MG/ML
2 INJECTION, SOLUTION INTRAVENOUS
Status: DISCONTINUED | OUTPATIENT
Start: 2021-07-02 | End: 2021-07-04 | Stop reason: HOSPADM

## 2021-07-02 RX ORDER — POTASSIUM CHLORIDE 20 MEQ/1
40 TABLET, EXTENDED RELEASE ORAL
Status: DISCONTINUED | OUTPATIENT
Start: 2021-07-02 | End: 2021-07-04 | Stop reason: HOSPADM

## 2021-07-02 RX ORDER — HYDRALAZINE HYDROCHLORIDE 20 MG/ML
10 INJECTION INTRAMUSCULAR; INTRAVENOUS
Status: COMPLETED | OUTPATIENT
Start: 2021-07-02 | End: 2021-07-02

## 2021-07-02 RX ORDER — IBUPROFEN 200 MG
16 TABLET ORAL
Status: DISCONTINUED | OUTPATIENT
Start: 2021-07-02 | End: 2021-07-04 | Stop reason: HOSPADM

## 2021-07-02 RX ORDER — ACETAMINOPHEN 325 MG/1
650 TABLET ORAL EVERY 8 HOURS PRN
Status: DISCONTINUED | OUTPATIENT
Start: 2021-07-02 | End: 2021-07-04 | Stop reason: HOSPADM

## 2021-07-02 RX ORDER — POTASSIUM CHLORIDE 20 MEQ/1
20 TABLET, EXTENDED RELEASE ORAL
Status: DISCONTINUED | OUTPATIENT
Start: 2021-07-02 | End: 2021-07-04 | Stop reason: HOSPADM

## 2021-07-02 RX ORDER — POLYETHYLENE GLYCOL 3350 17 G/17G
17 POWDER, FOR SOLUTION ORAL DAILY
Status: DISCONTINUED | OUTPATIENT
Start: 2021-07-02 | End: 2021-07-04 | Stop reason: HOSPADM

## 2021-07-02 RX ORDER — TALC
6 POWDER (GRAM) TOPICAL NIGHTLY PRN
Status: DISCONTINUED | OUTPATIENT
Start: 2021-07-02 | End: 2021-07-04 | Stop reason: HOSPADM

## 2021-07-02 RX ORDER — METOPROLOL SUCCINATE 50 MG/1
100 TABLET, EXTENDED RELEASE ORAL DAILY
Status: DISCONTINUED | OUTPATIENT
Start: 2021-07-02 | End: 2021-07-04 | Stop reason: HOSPADM

## 2021-07-02 RX ORDER — LANOLIN ALCOHOL/MO/W.PET/CERES
800 CREAM (GRAM) TOPICAL
Status: DISCONTINUED | OUTPATIENT
Start: 2021-07-02 | End: 2021-07-04 | Stop reason: HOSPADM

## 2021-07-02 RX ORDER — ALBUTEROL SULFATE 90 UG/1
2 AEROSOL, METERED RESPIRATORY (INHALATION) EVERY 8 HOURS
Status: DISCONTINUED | OUTPATIENT
Start: 2021-07-02 | End: 2021-07-02

## 2021-07-02 RX ORDER — ATORVASTATIN CALCIUM 40 MG/1
40 TABLET, FILM COATED ORAL DAILY
Status: DISCONTINUED | OUTPATIENT
Start: 2021-07-02 | End: 2021-07-02

## 2021-07-02 RX ORDER — POTASSIUM CHLORIDE 7.45 MG/ML
20 INJECTION INTRAVENOUS
Status: DISCONTINUED | OUTPATIENT
Start: 2021-07-02 | End: 2021-07-04 | Stop reason: HOSPADM

## 2021-07-02 RX ORDER — ACETAMINOPHEN 325 MG/1
650 TABLET ORAL EVERY 4 HOURS PRN
Status: DISCONTINUED | OUTPATIENT
Start: 2021-07-02 | End: 2021-07-04 | Stop reason: HOSPADM

## 2021-07-02 RX ORDER — POTASSIUM CHLORIDE 7.45 MG/ML
40 INJECTION INTRAVENOUS
Status: DISCONTINUED | OUTPATIENT
Start: 2021-07-02 | End: 2021-07-04 | Stop reason: HOSPADM

## 2021-07-02 RX ORDER — GLUCAGON 1 MG
1 KIT INJECTION
Status: DISCONTINUED | OUTPATIENT
Start: 2021-07-02 | End: 2021-07-04 | Stop reason: HOSPADM

## 2021-07-02 RX ORDER — INSULIN ASPART 100 [IU]/ML
10 INJECTION, SOLUTION INTRAVENOUS; SUBCUTANEOUS
Status: DISCONTINUED | OUTPATIENT
Start: 2021-07-02 | End: 2021-07-04 | Stop reason: HOSPADM

## 2021-07-02 RX ORDER — METOPROLOL SUCCINATE 50 MG/1
200 TABLET, EXTENDED RELEASE ORAL DAILY
Status: DISCONTINUED | OUTPATIENT
Start: 2021-07-02 | End: 2021-07-02

## 2021-07-02 RX ORDER — HYDROCODONE BITARTRATE AND ACETAMINOPHEN 5; 325 MG/1; MG/1
1 TABLET ORAL EVERY 6 HOURS PRN
Status: DISCONTINUED | OUTPATIENT
Start: 2021-07-02 | End: 2021-07-04 | Stop reason: HOSPADM

## 2021-07-02 RX ORDER — MAGNESIUM SULFATE HEPTAHYDRATE 40 MG/ML
4 INJECTION, SOLUTION INTRAVENOUS
Status: DISCONTINUED | OUTPATIENT
Start: 2021-07-02 | End: 2021-07-04 | Stop reason: HOSPADM

## 2021-07-02 RX ORDER — AMOXICILLIN 250 MG
1 CAPSULE ORAL 2 TIMES DAILY
Status: DISCONTINUED | OUTPATIENT
Start: 2021-07-02 | End: 2021-07-04 | Stop reason: HOSPADM

## 2021-07-02 RX ORDER — INSULIN ASPART 100 [IU]/ML
1-10 INJECTION, SOLUTION INTRAVENOUS; SUBCUTANEOUS
Status: DISCONTINUED | OUTPATIENT
Start: 2021-07-02 | End: 2021-07-04 | Stop reason: HOSPADM

## 2021-07-02 RX ORDER — MORPHINE SULFATE 2 MG/ML
2 INJECTION, SOLUTION INTRAMUSCULAR; INTRAVENOUS EVERY 6 HOURS PRN
Status: DISCONTINUED | OUTPATIENT
Start: 2021-07-02 | End: 2021-07-04 | Stop reason: HOSPADM

## 2021-07-02 RX ORDER — NAPROXEN SODIUM 220 MG/1
81 TABLET, FILM COATED ORAL DAILY
Status: DISCONTINUED | OUTPATIENT
Start: 2021-07-03 | End: 2021-07-04 | Stop reason: HOSPADM

## 2021-07-02 RX ORDER — ONDANSETRON 2 MG/ML
4 INJECTION INTRAMUSCULAR; INTRAVENOUS EVERY 8 HOURS PRN
Status: DISCONTINUED | OUTPATIENT
Start: 2021-07-02 | End: 2021-07-04 | Stop reason: HOSPADM

## 2021-07-02 RX ORDER — ATORVASTATIN CALCIUM 40 MG/1
40 TABLET, FILM COATED ORAL NIGHTLY
Status: DISCONTINUED | OUTPATIENT
Start: 2021-07-02 | End: 2021-07-04 | Stop reason: HOSPADM

## 2021-07-02 RX ORDER — ENOXAPARIN SODIUM 100 MG/ML
40 INJECTION SUBCUTANEOUS EVERY 24 HOURS
Status: DISCONTINUED | OUTPATIENT
Start: 2021-07-02 | End: 2021-07-04 | Stop reason: HOSPADM

## 2021-07-02 RX ORDER — IBUPROFEN 200 MG
24 TABLET ORAL
Status: DISCONTINUED | OUTPATIENT
Start: 2021-07-02 | End: 2021-07-04 | Stop reason: HOSPADM

## 2021-07-02 RX ORDER — HYDRALAZINE HYDROCHLORIDE 25 MG/1
100 TABLET, FILM COATED ORAL 3 TIMES DAILY
Status: DISCONTINUED | OUTPATIENT
Start: 2021-07-02 | End: 2021-07-04 | Stop reason: HOSPADM

## 2021-07-02 RX ORDER — ALBUTEROL SULFATE 90 UG/1
2 AEROSOL, METERED RESPIRATORY (INHALATION)
Status: DISCONTINUED | OUTPATIENT
Start: 2021-07-02 | End: 2021-07-02

## 2021-07-02 RX ORDER — MAGNESIUM SULFATE 1 G/100ML
1 INJECTION INTRAVENOUS
Status: DISCONTINUED | OUTPATIENT
Start: 2021-07-02 | End: 2021-07-04 | Stop reason: HOSPADM

## 2021-07-02 RX ORDER — ASPIRIN 325 MG
325 TABLET ORAL
Status: COMPLETED | OUTPATIENT
Start: 2021-07-02 | End: 2021-07-02

## 2021-07-02 RX ADMIN — INSULIN ASPART 10 UNITS: 100 INJECTION, SOLUTION INTRAVENOUS; SUBCUTANEOUS at 04:07

## 2021-07-02 RX ADMIN — METOPROLOL SUCCINATE 100 MG: 50 TABLET, FILM COATED, EXTENDED RELEASE ORAL at 02:07

## 2021-07-02 RX ADMIN — CEFTRIAXONE 1 G: 1 INJECTION, SOLUTION INTRAVENOUS at 02:07

## 2021-07-02 RX ADMIN — POTASSIUM CHLORIDE 20 MEQ: 20 TABLET, EXTENDED RELEASE ORAL at 09:07

## 2021-07-02 RX ADMIN — ASPIRIN 325 MG ORAL TABLET 325 MG: 325 PILL ORAL at 09:07

## 2021-07-02 RX ADMIN — HYDRALAZINE HYDROCHLORIDE 100 MG: 25 TABLET, FILM COATED ORAL at 02:07

## 2021-07-02 RX ADMIN — INSULIN ASPART 4 UNITS: 100 INJECTION, SOLUTION INTRAVENOUS; SUBCUTANEOUS at 04:07

## 2021-07-02 RX ADMIN — ENOXAPARIN SODIUM 40 MG: 40 INJECTION SUBCUTANEOUS at 04:07

## 2021-07-02 RX ADMIN — NITROGLYCERIN 0.5 INCH: 20 OINTMENT TOPICAL at 09:07

## 2021-07-02 RX ADMIN — INSULIN DETEMIR 30 UNITS: 100 INJECTION, SOLUTION SUBCUTANEOUS at 09:07

## 2021-07-02 RX ADMIN — HYDRALAZINE HYDROCHLORIDE 100 MG: 25 TABLET, FILM COATED ORAL at 09:07

## 2021-07-02 RX ADMIN — HYDRALAZINE HYDROCHLORIDE 10 MG: 20 INJECTION INTRAMUSCULAR; INTRAVENOUS at 06:07

## 2021-07-02 RX ADMIN — ATORVASTATIN CALCIUM 40 MG: 40 TABLET, FILM COATED ORAL at 09:07

## 2021-07-03 LAB
ALBUMIN SERPL BCP-MCNC: 3.4 G/DL (ref 3.5–5.2)
ALP SERPL-CCNC: 70 U/L (ref 55–135)
ALT SERPL W/O P-5'-P-CCNC: 23 U/L (ref 10–44)
ANION GAP SERPL CALC-SCNC: 10 MMOL/L (ref 8–16)
AST SERPL-CCNC: 28 U/L (ref 10–40)
BASOPHILS # BLD AUTO: 0.02 K/UL (ref 0–0.2)
BASOPHILS NFR BLD: 0.5 % (ref 0–1.9)
BILIRUB SERPL-MCNC: 0.9 MG/DL (ref 0.1–1)
BUN SERPL-MCNC: 32 MG/DL (ref 8–23)
CALCIUM SERPL-MCNC: 8.9 MG/DL (ref 8.7–10.5)
CHLORIDE SERPL-SCNC: 102 MMOL/L (ref 95–110)
CO2 SERPL-SCNC: 23 MMOL/L (ref 23–29)
CREAT SERPL-MCNC: 1.2 MG/DL (ref 0.5–1.4)
CRP SERPL-MCNC: 0.87 MG/DL
DIFFERENTIAL METHOD: ABNORMAL
EOSINOPHIL # BLD AUTO: 0 K/UL (ref 0–0.5)
EOSINOPHIL NFR BLD: 0 % (ref 0–8)
ERYTHROCYTE [DISTWIDTH] IN BLOOD BY AUTOMATED COUNT: 14.2 % (ref 11.5–14.5)
EST. GFR  (AFRICAN AMERICAN): 55.6 ML/MIN/1.73 M^2
EST. GFR  (NON AFRICAN AMERICAN): 48.2 ML/MIN/1.73 M^2
FERRITIN SERPL-MCNC: 688 NG/ML (ref 20–300)
GLUCOSE SERPL-MCNC: 157 MG/DL (ref 70–110)
GLUCOSE SERPL-MCNC: 196 MG/DL (ref 70–110)
GLUCOSE SERPL-MCNC: 203 MG/DL (ref 70–110)
GLUCOSE SERPL-MCNC: 211 MG/DL (ref 70–110)
HCT VFR BLD AUTO: 35.3 % (ref 37–48.5)
HGB BLD-MCNC: 12.6 G/DL (ref 12–16)
IMM GRANULOCYTES # BLD AUTO: 0.02 K/UL (ref 0–0.04)
IMM GRANULOCYTES NFR BLD AUTO: 0.5 % (ref 0–0.5)
LYMPHOCYTES # BLD AUTO: 1.6 K/UL (ref 1–4.8)
LYMPHOCYTES NFR BLD: 39.7 % (ref 18–48)
MAGNESIUM SERPL-MCNC: 1.9 MG/DL (ref 1.6–2.6)
MCH RBC QN AUTO: 28 PG (ref 27–31)
MCHC RBC AUTO-ENTMCNC: 35.7 G/DL (ref 32–36)
MCV RBC AUTO: 78 FL (ref 82–98)
MONOCYTES # BLD AUTO: 0.6 K/UL (ref 0.3–1)
MONOCYTES NFR BLD: 15.5 % (ref 4–15)
NEUTROPHILS # BLD AUTO: 1.8 K/UL (ref 1.8–7.7)
NEUTROPHILS NFR BLD: 43.8 % (ref 38–73)
NRBC BLD-RTO: 0 /100 WBC
PHOSPHATE SERPL-MCNC: 3.2 MG/DL (ref 2.7–4.5)
PLATELET # BLD AUTO: 282 K/UL (ref 150–450)
PMV BLD AUTO: 10.7 FL (ref 9.2–12.9)
POTASSIUM SERPL-SCNC: 3.8 MMOL/L (ref 3.5–5.1)
PROT SERPL-MCNC: 7.1 G/DL (ref 6–8.4)
RBC # BLD AUTO: 4.5 M/UL (ref 4–5.4)
SODIUM SERPL-SCNC: 135 MMOL/L (ref 136–145)
WBC # BLD AUTO: 4.13 K/UL (ref 3.9–12.7)

## 2021-07-03 PROCEDURE — 25000003 PHARM REV CODE 250: Performed by: HOSPITALIST

## 2021-07-03 PROCEDURE — 99900035 HC TECH TIME PER 15 MIN (STAT)

## 2021-07-03 PROCEDURE — 85025 COMPLETE CBC W/AUTO DIFF WBC: CPT | Performed by: HOSPITALIST

## 2021-07-03 PROCEDURE — G0378 HOSPITAL OBSERVATION PER HR: HCPCS | Mod: CS

## 2021-07-03 PROCEDURE — 94761 N-INVAS EAR/PLS OXIMETRY MLT: CPT

## 2021-07-03 PROCEDURE — 82728 ASSAY OF FERRITIN: CPT | Performed by: HOSPITALIST

## 2021-07-03 PROCEDURE — 96372 THER/PROPH/DIAG INJ SC/IM: CPT | Mod: 59

## 2021-07-03 PROCEDURE — 96365 THER/PROPH/DIAG IV INF INIT: CPT | Mod: 59

## 2021-07-03 PROCEDURE — 86140 C-REACTIVE PROTEIN: CPT | Performed by: HOSPITALIST

## 2021-07-03 PROCEDURE — 36415 COLL VENOUS BLD VENIPUNCTURE: CPT | Performed by: HOSPITALIST

## 2021-07-03 PROCEDURE — 63600175 PHARM REV CODE 636 W HCPCS: Performed by: HOSPITALIST

## 2021-07-03 PROCEDURE — 83735 ASSAY OF MAGNESIUM: CPT | Performed by: HOSPITALIST

## 2021-07-03 PROCEDURE — 97165 OT EVAL LOW COMPLEX 30 MIN: CPT

## 2021-07-03 PROCEDURE — 97535 SELF CARE MNGMENT TRAINING: CPT

## 2021-07-03 PROCEDURE — 97161 PT EVAL LOW COMPLEX 20 MIN: CPT

## 2021-07-03 PROCEDURE — 99900031 HC PATIENT EDUCATION (STAT)

## 2021-07-03 PROCEDURE — 80053 COMPREHEN METABOLIC PANEL: CPT | Performed by: HOSPITALIST

## 2021-07-03 PROCEDURE — 97116 GAIT TRAINING THERAPY: CPT

## 2021-07-03 PROCEDURE — 84100 ASSAY OF PHOSPHORUS: CPT | Performed by: HOSPITALIST

## 2021-07-03 RX ADMIN — HYDRALAZINE HYDROCHLORIDE 100 MG: 25 TABLET, FILM COATED ORAL at 08:07

## 2021-07-03 RX ADMIN — INSULIN ASPART 10 UNITS: 100 INJECTION, SOLUTION INTRAVENOUS; SUBCUTANEOUS at 08:07

## 2021-07-03 RX ADMIN — CEFTRIAXONE 1 G: 1 INJECTION, SOLUTION INTRAVENOUS at 03:07

## 2021-07-03 RX ADMIN — INSULIN ASPART 4 UNITS: 100 INJECTION, SOLUTION INTRAVENOUS; SUBCUTANEOUS at 11:07

## 2021-07-03 RX ADMIN — ATORVASTATIN CALCIUM 40 MG: 40 TABLET, FILM COATED ORAL at 08:07

## 2021-07-03 RX ADMIN — HYDRALAZINE HYDROCHLORIDE 100 MG: 25 TABLET, FILM COATED ORAL at 03:07

## 2021-07-03 RX ADMIN — POTASSIUM CHLORIDE 20 MEQ: 20 TABLET, EXTENDED RELEASE ORAL at 05:07

## 2021-07-03 RX ADMIN — ASPIRIN 81 MG: 81 TABLET, CHEWABLE ORAL at 08:07

## 2021-07-03 RX ADMIN — INSULIN ASPART 10 UNITS: 100 INJECTION, SOLUTION INTRAVENOUS; SUBCUTANEOUS at 11:07

## 2021-07-03 RX ADMIN — ENOXAPARIN SODIUM 40 MG: 40 INJECTION SUBCUTANEOUS at 05:07

## 2021-07-03 RX ADMIN — METOPROLOL SUCCINATE 100 MG: 50 TABLET, FILM COATED, EXTENDED RELEASE ORAL at 08:07

## 2021-07-03 RX ADMIN — INSULIN ASPART 10 UNITS: 100 INJECTION, SOLUTION INTRAVENOUS; SUBCUTANEOUS at 05:07

## 2021-07-03 RX ADMIN — SENNOSIDES AND DOCUSATE SODIUM 1 TABLET: 8.6; 5 TABLET ORAL at 08:07

## 2021-07-03 RX ADMIN — INSULIN ASPART 4 UNITS: 100 INJECTION, SOLUTION INTRAVENOUS; SUBCUTANEOUS at 05:07

## 2021-07-04 VITALS
WEIGHT: 125 LBS | TEMPERATURE: 99 F | RESPIRATION RATE: 18 BRPM | BODY MASS INDEX: 25.2 KG/M2 | OXYGEN SATURATION: 98 % | DIASTOLIC BLOOD PRESSURE: 66 MMHG | SYSTOLIC BLOOD PRESSURE: 131 MMHG | HEIGHT: 59 IN | HEART RATE: 66 BPM

## 2021-07-04 PROBLEM — I16.9 HYPERTENSIVE CRISIS: Status: RESOLVED | Noted: 2021-07-02 | Resolved: 2021-07-04

## 2021-07-04 PROBLEM — N17.9 AKI (ACUTE KIDNEY INJURY): Status: RESOLVED | Noted: 2021-07-02 | Resolved: 2021-07-04

## 2021-07-04 LAB
ALBUMIN SERPL BCP-MCNC: 3.3 G/DL (ref 3.5–5.2)
ALP SERPL-CCNC: 63 U/L (ref 55–135)
ALT SERPL W/O P-5'-P-CCNC: 23 U/L (ref 10–44)
ANION GAP SERPL CALC-SCNC: 12 MMOL/L (ref 8–16)
AST SERPL-CCNC: 29 U/L (ref 10–40)
BACTERIA UR CULT: NO GROWTH
BASOPHILS # BLD AUTO: 0.01 K/UL (ref 0–0.2)
BASOPHILS NFR BLD: 0.3 % (ref 0–1.9)
BILIRUB SERPL-MCNC: 0.8 MG/DL (ref 0.1–1)
BUN SERPL-MCNC: 34 MG/DL (ref 8–23)
CALCIUM SERPL-MCNC: 8.9 MG/DL (ref 8.7–10.5)
CHLORIDE SERPL-SCNC: 102 MMOL/L (ref 95–110)
CO2 SERPL-SCNC: 21 MMOL/L (ref 23–29)
CREAT SERPL-MCNC: 1.3 MG/DL (ref 0.5–1.4)
CRP SERPL-MCNC: 0.34 MG/DL
DIFFERENTIAL METHOD: ABNORMAL
EOSINOPHIL # BLD AUTO: 0 K/UL (ref 0–0.5)
EOSINOPHIL NFR BLD: 0 % (ref 0–8)
ERYTHROCYTE [DISTWIDTH] IN BLOOD BY AUTOMATED COUNT: 14.1 % (ref 11.5–14.5)
EST. GFR  (AFRICAN AMERICAN): 50.5 ML/MIN/1.73 M^2
EST. GFR  (NON AFRICAN AMERICAN): 43.8 ML/MIN/1.73 M^2
FERRITIN SERPL-MCNC: 760 NG/ML (ref 20–300)
GLUCOSE SERPL-MCNC: 181 MG/DL (ref 70–110)
GLUCOSE SERPL-MCNC: 195 MG/DL (ref 70–110)
HCT VFR BLD AUTO: 35.4 % (ref 37–48.5)
HGB BLD-MCNC: 12.6 G/DL (ref 12–16)
IMM GRANULOCYTES # BLD AUTO: 0.01 K/UL (ref 0–0.04)
IMM GRANULOCYTES NFR BLD AUTO: 0.3 % (ref 0–0.5)
LYMPHOCYTES # BLD AUTO: 1.9 K/UL (ref 1–4.8)
LYMPHOCYTES NFR BLD: 52.2 % (ref 18–48)
MAGNESIUM SERPL-MCNC: 2 MG/DL (ref 1.6–2.6)
MCH RBC QN AUTO: 27.8 PG (ref 27–31)
MCHC RBC AUTO-ENTMCNC: 35.6 G/DL (ref 32–36)
MCV RBC AUTO: 78 FL (ref 82–98)
MONOCYTES # BLD AUTO: 0.4 K/UL (ref 0.3–1)
MONOCYTES NFR BLD: 12.1 % (ref 4–15)
NEUTROPHILS # BLD AUTO: 1.3 K/UL (ref 1.8–7.7)
NEUTROPHILS NFR BLD: 35.1 % (ref 38–73)
NRBC BLD-RTO: 0 /100 WBC
PHOSPHATE SERPL-MCNC: 3.5 MG/DL (ref 2.7–4.5)
PLATELET # BLD AUTO: 311 K/UL (ref 150–450)
PMV BLD AUTO: 10.9 FL (ref 9.2–12.9)
POTASSIUM SERPL-SCNC: 3.7 MMOL/L (ref 3.5–5.1)
PROT SERPL-MCNC: 7 G/DL (ref 6–8.4)
RBC # BLD AUTO: 4.54 M/UL (ref 4–5.4)
SODIUM SERPL-SCNC: 135 MMOL/L (ref 136–145)
WBC # BLD AUTO: 3.56 K/UL (ref 3.9–12.7)

## 2021-07-04 PROCEDURE — 82728 ASSAY OF FERRITIN: CPT | Performed by: HOSPITALIST

## 2021-07-04 PROCEDURE — 99900031 HC PATIENT EDUCATION (STAT)

## 2021-07-04 PROCEDURE — 80053 COMPREHEN METABOLIC PANEL: CPT | Performed by: HOSPITALIST

## 2021-07-04 PROCEDURE — 94761 N-INVAS EAR/PLS OXIMETRY MLT: CPT

## 2021-07-04 PROCEDURE — 85025 COMPLETE CBC W/AUTO DIFF WBC: CPT | Performed by: HOSPITALIST

## 2021-07-04 PROCEDURE — 96372 THER/PROPH/DIAG INJ SC/IM: CPT | Mod: 59

## 2021-07-04 PROCEDURE — 86140 C-REACTIVE PROTEIN: CPT | Performed by: HOSPITALIST

## 2021-07-04 PROCEDURE — G0378 HOSPITAL OBSERVATION PER HR: HCPCS

## 2021-07-04 PROCEDURE — 36415 COLL VENOUS BLD VENIPUNCTURE: CPT | Performed by: HOSPITALIST

## 2021-07-04 PROCEDURE — 25000003 PHARM REV CODE 250: Performed by: HOSPITALIST

## 2021-07-04 PROCEDURE — 99900035 HC TECH TIME PER 15 MIN (STAT)

## 2021-07-04 PROCEDURE — 84100 ASSAY OF PHOSPHORUS: CPT | Performed by: HOSPITALIST

## 2021-07-04 PROCEDURE — 83735 ASSAY OF MAGNESIUM: CPT | Performed by: HOSPITALIST

## 2021-07-04 RX ORDER — METOPROLOL SUCCINATE 100 MG/1
100 TABLET, EXTENDED RELEASE ORAL DAILY
Qty: 90 TABLET | Refills: 0 | Status: SHIPPED | OUTPATIENT
Start: 2021-07-04 | End: 2021-10-01 | Stop reason: SDUPTHER

## 2021-07-04 RX ADMIN — INSULIN ASPART 2 UNITS: 100 INJECTION, SOLUTION INTRAVENOUS; SUBCUTANEOUS at 07:07

## 2021-07-04 RX ADMIN — METOPROLOL SUCCINATE 100 MG: 50 TABLET, FILM COATED, EXTENDED RELEASE ORAL at 08:07

## 2021-07-04 RX ADMIN — HYDRALAZINE HYDROCHLORIDE 100 MG: 25 TABLET, FILM COATED ORAL at 08:07

## 2021-07-04 RX ADMIN — SENNOSIDES AND DOCUSATE SODIUM 1 TABLET: 8.6; 5 TABLET ORAL at 08:07

## 2021-07-04 RX ADMIN — INSULIN ASPART 10 UNITS: 100 INJECTION, SOLUTION INTRAVENOUS; SUBCUTANEOUS at 07:07

## 2021-07-04 RX ADMIN — ASPIRIN 81 MG: 81 TABLET, CHEWABLE ORAL at 08:07

## 2021-07-04 RX ADMIN — POTASSIUM CHLORIDE 20 MEQ: 20 TABLET, EXTENDED RELEASE ORAL at 05:07

## 2021-07-04 RX ADMIN — POLYETHYLENE GLYCOL 3350 17 G: 17 POWDER, FOR SOLUTION ORAL at 08:07

## 2021-07-06 ENCOUNTER — PATIENT OUTREACH (OUTPATIENT)
Dept: INFECTIOUS DISEASES | Facility: HOSPITAL | Age: 61
End: 2021-07-06

## 2021-07-07 ENCOUNTER — PATIENT MESSAGE (OUTPATIENT)
Dept: ADMINISTRATIVE | Facility: HOSPITAL | Age: 61
End: 2021-07-07

## 2021-07-07 ENCOUNTER — PATIENT OUTREACH (OUTPATIENT)
Dept: INFECTIOUS DISEASES | Facility: HOSPITAL | Age: 61
End: 2021-07-07

## 2021-07-20 ENCOUNTER — PATIENT OUTREACH (OUTPATIENT)
Dept: ADMINISTRATIVE | Facility: OTHER | Age: 61
End: 2021-07-20

## 2021-07-21 DIAGNOSIS — E11.59 HYPERTENSION ASSOCIATED WITH DIABETES: ICD-10-CM

## 2021-07-21 DIAGNOSIS — I15.2 HYPERTENSION ASSOCIATED WITH DIABETES: ICD-10-CM

## 2021-07-27 ENCOUNTER — OFFICE VISIT (OUTPATIENT)
Dept: OPHTHALMOLOGY | Facility: CLINIC | Age: 61
End: 2021-07-27
Payer: MEDICARE

## 2021-07-27 DIAGNOSIS — H04.123 DRY EYE SYNDROME, BILATERAL: ICD-10-CM

## 2021-07-27 DIAGNOSIS — H25.13 AGE-RELATED NUCLEAR CATARACT OF BOTH EYES: ICD-10-CM

## 2021-07-27 DIAGNOSIS — H25.041 POSTERIOR SUBCAPSULAR AGE-RELATED CATARACT, RIGHT EYE: ICD-10-CM

## 2021-07-27 DIAGNOSIS — H40.053 OCULAR HYPERTENSION, BILATERAL: Primary | ICD-10-CM

## 2021-07-27 PROCEDURE — 1160F RVW MEDS BY RX/DR IN RCRD: CPT | Mod: CPTII,S$GLB,, | Performed by: OPHTHALMOLOGY

## 2021-07-27 PROCEDURE — 1159F PR MEDICATION LIST DOCUMENTED IN MEDICAL RECORD: ICD-10-PCS | Mod: CPTII,S$GLB,, | Performed by: OPHTHALMOLOGY

## 2021-07-27 PROCEDURE — 99999 PR PBB SHADOW E&M-EST. PATIENT-LVL III: ICD-10-PCS | Mod: PBBFAC,,, | Performed by: OPHTHALMOLOGY

## 2021-07-27 PROCEDURE — 92020 GONIOSCOPY: CPT | Mod: S$GLB,,, | Performed by: OPHTHALMOLOGY

## 2021-07-27 PROCEDURE — 99214 PR OFFICE/OUTPT VISIT, EST, LEVL IV, 30-39 MIN: ICD-10-PCS | Mod: S$GLB,,, | Performed by: OPHTHALMOLOGY

## 2021-07-27 PROCEDURE — 99999 PR PBB SHADOW E&M-EST. PATIENT-LVL III: CPT | Mod: PBBFAC,,, | Performed by: OPHTHALMOLOGY

## 2021-07-27 PROCEDURE — 1126F AMNT PAIN NOTED NONE PRSNT: CPT | Mod: CPTII,S$GLB,, | Performed by: OPHTHALMOLOGY

## 2021-07-27 PROCEDURE — 99499 RISK ADDL DX/OHS AUDIT: ICD-10-PCS | Mod: HCNC,S$GLB,, | Performed by: OPHTHALMOLOGY

## 2021-07-27 PROCEDURE — 1159F MED LIST DOCD IN RCRD: CPT | Mod: CPTII,S$GLB,, | Performed by: OPHTHALMOLOGY

## 2021-07-27 PROCEDURE — 1160F PR REVIEW ALL MEDS BY PRESCRIBER/CLIN PHARMACIST DOCUMENTED: ICD-10-PCS | Mod: CPTII,S$GLB,, | Performed by: OPHTHALMOLOGY

## 2021-07-27 PROCEDURE — 99499 UNLISTED E&M SERVICE: CPT | Mod: HCNC,S$GLB,, | Performed by: OPHTHALMOLOGY

## 2021-07-27 PROCEDURE — 92020 PR SPECIAL EYE EVAL,GONIOSCOPY: ICD-10-PCS | Mod: S$GLB,,, | Performed by: OPHTHALMOLOGY

## 2021-07-27 PROCEDURE — 1126F PR PAIN SEVERITY QUANTIFIED, NO PAIN PRESENT: ICD-10-PCS | Mod: CPTII,S$GLB,, | Performed by: OPHTHALMOLOGY

## 2021-07-27 PROCEDURE — 99214 OFFICE O/P EST MOD 30 MIN: CPT | Mod: S$GLB,,, | Performed by: OPHTHALMOLOGY

## 2021-07-27 PROCEDURE — 3046F HEMOGLOBIN A1C LEVEL >9.0%: CPT | Mod: CPTII,S$GLB,, | Performed by: OPHTHALMOLOGY

## 2021-07-27 PROCEDURE — 3046F PR MOST RECENT HEMOGLOBIN A1C LEVEL > 9.0%: ICD-10-PCS | Mod: CPTII,S$GLB,, | Performed by: OPHTHALMOLOGY

## 2021-08-26 ENCOUNTER — PATIENT OUTREACH (OUTPATIENT)
Dept: FAMILY MEDICINE | Facility: CLINIC | Age: 61
End: 2021-08-26

## 2021-08-30 ENCOUNTER — PATIENT OUTREACH (OUTPATIENT)
Dept: ADMINISTRATIVE | Facility: OTHER | Age: 61
End: 2021-08-30

## 2021-09-22 DIAGNOSIS — E11.9 TYPE 2 DIABETES MELLITUS WITHOUT COMPLICATION: ICD-10-CM

## 2021-10-01 ENCOUNTER — TELEPHONE (OUTPATIENT)
Dept: FAMILY MEDICINE | Facility: CLINIC | Age: 61
End: 2021-10-01

## 2021-10-01 DIAGNOSIS — E11.59 HYPERTENSION ASSOCIATED WITH DIABETES: ICD-10-CM

## 2021-10-01 DIAGNOSIS — E78.2 MIXED HYPERLIPIDEMIA: ICD-10-CM

## 2021-10-01 DIAGNOSIS — I15.2 HYPERTENSION ASSOCIATED WITH DIABETES: ICD-10-CM

## 2021-10-01 DIAGNOSIS — I10 HYPERTENSION, UNCONTROLLED: ICD-10-CM

## 2021-10-01 DIAGNOSIS — I10 UNCONTROLLED HYPERTENSION: ICD-10-CM

## 2021-10-01 DIAGNOSIS — Z79.4 TYPE 2 DIABETES MELLITUS WITH DIABETIC NEPHROPATHY, WITH LONG-TERM CURRENT USE OF INSULIN: Primary | ICD-10-CM

## 2021-10-01 DIAGNOSIS — E11.21 TYPE 2 DIABETES MELLITUS WITH DIABETIC NEPHROPATHY, WITH LONG-TERM CURRENT USE OF INSULIN: Primary | ICD-10-CM

## 2021-10-03 RX ORDER — AMLODIPINE AND VALSARTAN 10; 320 MG/1; MG/1
1 TABLET ORAL DAILY
Qty: 90 TABLET | Refills: 3 | Status: SHIPPED | OUTPATIENT
Start: 2021-10-03 | End: 2021-11-23

## 2021-10-03 RX ORDER — CHLORTHALIDONE 50 MG/1
50 TABLET ORAL DAILY
Qty: 90 TABLET | Refills: 3 | Status: SHIPPED | OUTPATIENT
Start: 2021-10-03 | End: 2021-10-06 | Stop reason: SDUPTHER

## 2021-10-03 RX ORDER — ATORVASTATIN CALCIUM 40 MG/1
40 TABLET, FILM COATED ORAL DAILY
Qty: 90 TABLET | Refills: 3 | Status: SHIPPED | OUTPATIENT
Start: 2021-10-03 | End: 2021-10-06 | Stop reason: SDUPTHER

## 2021-10-03 RX ORDER — INSULIN DEGLUDEC 100 U/ML
36 INJECTION, SOLUTION SUBCUTANEOUS DAILY
Qty: 11 PEN | Refills: 3 | Status: SHIPPED | OUTPATIENT
Start: 2021-10-03 | End: 2021-10-06 | Stop reason: SDUPTHER

## 2021-10-03 RX ORDER — PEN NEEDLE, DIABETIC 30 GX3/16"
NEEDLE, DISPOSABLE MISCELLANEOUS
Qty: 90 EACH | Refills: 3 | Status: SHIPPED | OUTPATIENT
Start: 2021-10-03 | End: 2021-10-06 | Stop reason: SDUPTHER

## 2021-10-03 RX ORDER — TIOTROPIUM BROMIDE 18 UG/1
18 CAPSULE ORAL; RESPIRATORY (INHALATION) DAILY
Qty: 90 CAPSULE | Refills: 3 | OUTPATIENT
Start: 2021-10-03 | End: 2022-10-03

## 2021-10-03 RX ORDER — METOPROLOL SUCCINATE 100 MG/1
100 TABLET, EXTENDED RELEASE ORAL DAILY
Qty: 90 TABLET | Refills: 3 | Status: SHIPPED | OUTPATIENT
Start: 2021-10-03 | End: 2022-02-01 | Stop reason: SDUPTHER

## 2021-10-03 RX ORDER — HYDRALAZINE HYDROCHLORIDE 100 MG/1
100 TABLET, FILM COATED ORAL 3 TIMES DAILY
Qty: 270 TABLET | Refills: 3 | Status: SHIPPED | OUTPATIENT
Start: 2021-10-03 | End: 2022-02-01 | Stop reason: SDUPTHER

## 2021-10-06 DIAGNOSIS — Z79.4 TYPE 2 DIABETES MELLITUS WITH DIABETIC NEPHROPATHY, WITH LONG-TERM CURRENT USE OF INSULIN: ICD-10-CM

## 2021-10-06 DIAGNOSIS — E11.21 TYPE 2 DIABETES MELLITUS WITH DIABETIC NEPHROPATHY, WITH LONG-TERM CURRENT USE OF INSULIN: ICD-10-CM

## 2021-10-06 DIAGNOSIS — E11.59 HYPERTENSION ASSOCIATED WITH DIABETES: ICD-10-CM

## 2021-10-06 DIAGNOSIS — E78.2 MIXED HYPERLIPIDEMIA: ICD-10-CM

## 2021-10-06 DIAGNOSIS — I15.2 HYPERTENSION ASSOCIATED WITH DIABETES: ICD-10-CM

## 2021-10-06 RX ORDER — INSULIN DEGLUDEC 100 U/ML
36 INJECTION, SOLUTION SUBCUTANEOUS DAILY
Qty: 11 PEN | Refills: 3 | Status: ON HOLD | OUTPATIENT
Start: 2021-10-06 | End: 2021-11-24 | Stop reason: SDUPTHER

## 2021-10-06 RX ORDER — INSULIN ASPART 100 [IU]/ML
12 INJECTION, SOLUTION INTRAVENOUS; SUBCUTANEOUS
Qty: 11 SYRINGE | Refills: 3 | Status: SHIPPED | OUTPATIENT
Start: 2021-10-06 | End: 2022-02-01 | Stop reason: SDUPTHER

## 2021-10-06 RX ORDER — ATORVASTATIN CALCIUM 40 MG/1
40 TABLET, FILM COATED ORAL DAILY
Qty: 90 TABLET | Refills: 3 | Status: SHIPPED | OUTPATIENT
Start: 2021-10-06 | End: 2022-02-01 | Stop reason: SDUPTHER

## 2021-10-06 RX ORDER — CHLORTHALIDONE 50 MG/1
50 TABLET ORAL DAILY
Qty: 90 TABLET | Refills: 3 | Status: SHIPPED | OUTPATIENT
Start: 2021-10-06 | End: 2022-02-01 | Stop reason: SDUPTHER

## 2021-10-06 RX ORDER — PEN NEEDLE, DIABETIC 30 GX3/16"
NEEDLE, DISPOSABLE MISCELLANEOUS
Qty: 90 EACH | Refills: 3 | Status: SHIPPED | OUTPATIENT
Start: 2021-10-06 | End: 2022-05-18

## 2021-10-07 ENCOUNTER — PATIENT MESSAGE (OUTPATIENT)
Dept: ADMINISTRATIVE | Facility: HOSPITAL | Age: 61
End: 2021-10-07

## 2021-10-12 ENCOUNTER — PATIENT OUTREACH (OUTPATIENT)
Dept: ADMINISTRATIVE | Facility: HOSPITAL | Age: 61
End: 2021-10-12

## 2021-10-20 RX ORDER — INSULIN PUMP SYRINGE, 3 ML
EACH MISCELLANEOUS
Qty: 1 EACH | Refills: 0 | Status: SHIPPED | OUTPATIENT
Start: 2021-10-20 | End: 2022-05-18

## 2021-10-22 DIAGNOSIS — E11.21 TYPE 2 DIABETES MELLITUS WITH DIABETIC NEPHROPATHY, WITH LONG-TERM CURRENT USE OF INSULIN: ICD-10-CM

## 2021-10-22 DIAGNOSIS — Z79.4 TYPE 2 DIABETES MELLITUS WITH DIABETIC NEPHROPATHY, WITH LONG-TERM CURRENT USE OF INSULIN: ICD-10-CM

## 2021-10-27 DIAGNOSIS — E11.21 TYPE 2 DIABETES MELLITUS WITH DIABETIC NEPHROPATHY, WITH LONG-TERM CURRENT USE OF INSULIN: ICD-10-CM

## 2021-10-27 DIAGNOSIS — Z79.4 TYPE 2 DIABETES MELLITUS WITH DIABETIC NEPHROPATHY, WITH LONG-TERM CURRENT USE OF INSULIN: ICD-10-CM

## 2021-11-09 ENCOUNTER — PATIENT MESSAGE (OUTPATIENT)
Dept: ADMINISTRATIVE | Facility: HOSPITAL | Age: 61
End: 2021-11-09
Payer: MEDICARE

## 2021-11-09 ENCOUNTER — PATIENT OUTREACH (OUTPATIENT)
Dept: ADMINISTRATIVE | Facility: HOSPITAL | Age: 61
End: 2021-11-09
Payer: MEDICARE

## 2021-11-23 ENCOUNTER — PES CALL (OUTPATIENT)
Dept: ADMINISTRATIVE | Facility: CLINIC | Age: 61
End: 2021-11-23
Payer: MEDICARE

## 2021-11-23 ENCOUNTER — HOSPITAL ENCOUNTER (INPATIENT)
Facility: HOSPITAL | Age: 61
LOS: 1 days | Discharge: HOME | DRG: 603 | End: 2021-11-24
Attending: EMERGENCY MEDICINE | Admitting: INTERNAL MEDICINE
Payer: MEDICARE

## 2021-11-23 ENCOUNTER — TELEPHONE (OUTPATIENT)
Dept: FAMILY MEDICINE | Facility: CLINIC | Age: 61
End: 2021-11-23
Payer: MEDICARE

## 2021-11-23 DIAGNOSIS — L03.012 FELON OF FINGER OF LEFT HAND: ICD-10-CM

## 2021-11-23 DIAGNOSIS — L03.012 PARONYCHIA OF FINGER, LEFT: ICD-10-CM

## 2021-11-23 DIAGNOSIS — R07.9 CHEST PAIN: ICD-10-CM

## 2021-11-23 DIAGNOSIS — E11.65 TYPE 2 DIABETES MELLITUS WITH HYPERGLYCEMIA, WITH LONG-TERM CURRENT USE OF INSULIN: ICD-10-CM

## 2021-11-23 DIAGNOSIS — E11.65 POORLY CONTROLLED DIABETES MELLITUS: Primary | ICD-10-CM

## 2021-11-23 DIAGNOSIS — L03.012 CELLULITIS OF LEFT MIDDLE FINGER: ICD-10-CM

## 2021-11-23 DIAGNOSIS — I16.0 HYPERTENSIVE URGENCY: ICD-10-CM

## 2021-11-23 DIAGNOSIS — Z79.4 TYPE 2 DIABETES MELLITUS WITH HYPERGLYCEMIA, WITH LONG-TERM CURRENT USE OF INSULIN: ICD-10-CM

## 2021-11-23 DIAGNOSIS — M86.9 OSTEOMYELITIS OF FINGER OF LEFT HAND: ICD-10-CM

## 2021-11-23 LAB
ALBUMIN SERPL BCP-MCNC: 3.7 G/DL (ref 3.5–5.2)
ALP SERPL-CCNC: 82 U/L (ref 55–135)
ALT SERPL W/O P-5'-P-CCNC: 11 U/L (ref 10–44)
ANION GAP SERPL CALC-SCNC: 10 MMOL/L (ref 8–16)
AST SERPL-CCNC: 14 U/L (ref 10–40)
BASOPHILS # BLD AUTO: 0.06 K/UL (ref 0–0.2)
BASOPHILS NFR BLD: 0.7 % (ref 0–1.9)
BILIRUB SERPL-MCNC: 0.8 MG/DL (ref 0.1–1)
BUN SERPL-MCNC: 14 MG/DL (ref 8–23)
CALCIUM SERPL-MCNC: 9.2 MG/DL (ref 8.7–10.5)
CHLORIDE SERPL-SCNC: 95 MMOL/L (ref 95–110)
CO2 SERPL-SCNC: 27 MMOL/L (ref 23–29)
CREAT SERPL-MCNC: 1.1 MG/DL (ref 0.5–1.4)
CRP SERPL-MCNC: 3.21 MG/DL
DIFFERENTIAL METHOD: ABNORMAL
EOSINOPHIL # BLD AUTO: 0.3 K/UL (ref 0–0.5)
EOSINOPHIL NFR BLD: 3.1 % (ref 0–8)
ERYTHROCYTE [DISTWIDTH] IN BLOOD BY AUTOMATED COUNT: 14.3 % (ref 11.5–14.5)
ERYTHROCYTE [SEDIMENTATION RATE] IN BLOOD BY WESTERGREN METHOD: 39 MM/HR (ref 0–20)
EST. GFR  (AFRICAN AMERICAN): >60 ML/MIN/1.73 M^2
EST. GFR  (NON AFRICAN AMERICAN): 53.6 ML/MIN/1.73 M^2
GLUCOSE SERPL-MCNC: 420 MG/DL (ref 70–110)
GLUCOSE SERPL-MCNC: 464 MG/DL (ref 70–110)
GLUCOSE SERPL-MCNC: 472 MG/DL (ref 70–110)
HCT VFR BLD AUTO: 35.4 % (ref 37–48.5)
HGB BLD-MCNC: 12.3 G/DL (ref 12–16)
IMM GRANULOCYTES # BLD AUTO: 0.04 K/UL (ref 0–0.04)
IMM GRANULOCYTES NFR BLD AUTO: 0.5 % (ref 0–0.5)
INR PPP: 1.1
LYMPHOCYTES # BLD AUTO: 2.4 K/UL (ref 1–4.8)
LYMPHOCYTES NFR BLD: 27.9 % (ref 18–48)
MCH RBC QN AUTO: 27.1 PG (ref 27–31)
MCHC RBC AUTO-ENTMCNC: 34.7 G/DL (ref 32–36)
MCV RBC AUTO: 78 FL (ref 82–98)
MONOCYTES # BLD AUTO: 0.8 K/UL (ref 0.3–1)
MONOCYTES NFR BLD: 9.4 % (ref 4–15)
NEUTROPHILS # BLD AUTO: 5.1 K/UL (ref 1.8–7.7)
NEUTROPHILS NFR BLD: 58.4 % (ref 38–73)
NRBC BLD-RTO: 0 /100 WBC
PLATELET # BLD AUTO: 471 K/UL (ref 150–450)
PMV BLD AUTO: 10.4 FL (ref 9.2–12.9)
POTASSIUM SERPL-SCNC: 4.4 MMOL/L (ref 3.5–5.1)
PROT SERPL-MCNC: 8.3 G/DL (ref 6–8.4)
PROTHROMBIN TIME: 13.2 SEC (ref 11.4–13.7)
RBC # BLD AUTO: 4.54 M/UL (ref 4–5.4)
SARS-COV-2 RDRP RESP QL NAA+PROBE: NEGATIVE
SODIUM SERPL-SCNC: 132 MMOL/L (ref 136–145)
WBC # BLD AUTO: 8.64 K/UL (ref 3.9–12.7)

## 2021-11-23 PROCEDURE — 86140 C-REACTIVE PROTEIN: CPT | Performed by: STUDENT IN AN ORGANIZED HEALTH CARE EDUCATION/TRAINING PROGRAM

## 2021-11-23 PROCEDURE — 36415 COLL VENOUS BLD VENIPUNCTURE: CPT | Performed by: STUDENT IN AN ORGANIZED HEALTH CARE EDUCATION/TRAINING PROGRAM

## 2021-11-23 PROCEDURE — 87186 SC STD MICRODIL/AGAR DIL: CPT | Performed by: EMERGENCY MEDICINE

## 2021-11-23 PROCEDURE — 87077 CULTURE AEROBIC IDENTIFY: CPT | Performed by: EMERGENCY MEDICINE

## 2021-11-23 PROCEDURE — 25000003 PHARM REV CODE 250: Performed by: STUDENT IN AN ORGANIZED HEALTH CARE EDUCATION/TRAINING PROGRAM

## 2021-11-23 PROCEDURE — 96375 TX/PRO/DX INJ NEW DRUG ADDON: CPT

## 2021-11-23 PROCEDURE — 85025 COMPLETE CBC W/AUTO DIFF WBC: CPT | Performed by: STUDENT IN AN ORGANIZED HEALTH CARE EDUCATION/TRAINING PROGRAM

## 2021-11-23 PROCEDURE — 85610 PROTHROMBIN TIME: CPT | Performed by: STUDENT IN AN ORGANIZED HEALTH CARE EDUCATION/TRAINING PROGRAM

## 2021-11-23 PROCEDURE — 87147 CULTURE TYPE IMMUNOLOGIC: CPT | Performed by: EMERGENCY MEDICINE

## 2021-11-23 PROCEDURE — 85651 RBC SED RATE NONAUTOMATED: CPT | Performed by: STUDENT IN AN ORGANIZED HEALTH CARE EDUCATION/TRAINING PROGRAM

## 2021-11-23 PROCEDURE — 12000002 HC ACUTE/MED SURGE SEMI-PRIVATE ROOM

## 2021-11-23 PROCEDURE — 25000003 PHARM REV CODE 250: Performed by: EMERGENCY MEDICINE

## 2021-11-23 PROCEDURE — 99285 EMERGENCY DEPT VISIT HI MDM: CPT | Mod: 25

## 2021-11-23 PROCEDURE — 87070 CULTURE OTHR SPECIMN AEROBIC: CPT | Performed by: EMERGENCY MEDICINE

## 2021-11-23 PROCEDURE — 26011 DRAINAGE OF FINGER ABSCESS: CPT

## 2021-11-23 PROCEDURE — 82962 GLUCOSE BLOOD TEST: CPT

## 2021-11-23 PROCEDURE — 96365 THER/PROPH/DIAG IV INF INIT: CPT

## 2021-11-23 PROCEDURE — 87040 BLOOD CULTURE FOR BACTERIA: CPT | Mod: 59 | Performed by: STUDENT IN AN ORGANIZED HEALTH CARE EDUCATION/TRAINING PROGRAM

## 2021-11-23 PROCEDURE — 63600175 PHARM REV CODE 636 W HCPCS: Performed by: STUDENT IN AN ORGANIZED HEALTH CARE EDUCATION/TRAINING PROGRAM

## 2021-11-23 PROCEDURE — 10060 I&D ABSCESS SIMPLE/SINGLE: CPT

## 2021-11-23 PROCEDURE — 80053 COMPREHEN METABOLIC PANEL: CPT | Performed by: STUDENT IN AN ORGANIZED HEALTH CARE EDUCATION/TRAINING PROGRAM

## 2021-11-23 PROCEDURE — U0002 COVID-19 LAB TEST NON-CDC: HCPCS | Performed by: EMERGENCY MEDICINE

## 2021-11-23 RX ORDER — VANCOMYCIN HCL IN 5 % DEXTROSE 1G/250ML
1000 PLASTIC BAG, INJECTION (ML) INTRAVENOUS
Status: DISCONTINUED | OUTPATIENT
Start: 2021-11-24 | End: 2021-11-24 | Stop reason: HOSPADM

## 2021-11-23 RX ORDER — HYDRALAZINE HYDROCHLORIDE 25 MG/1
100 TABLET, FILM COATED ORAL 3 TIMES DAILY
Status: DISCONTINUED | OUTPATIENT
Start: 2021-11-24 | End: 2021-11-23

## 2021-11-23 RX ORDER — MUPIROCIN 20 MG/G
OINTMENT TOPICAL DAILY
Status: DISCONTINUED | OUTPATIENT
Start: 2021-11-23 | End: 2021-11-24 | Stop reason: HOSPADM

## 2021-11-23 RX ORDER — HYDRALAZINE HYDROCHLORIDE 20 MG/ML
10 INJECTION INTRAMUSCULAR; INTRAVENOUS
Status: COMPLETED | OUTPATIENT
Start: 2021-11-23 | End: 2021-11-23

## 2021-11-23 RX ORDER — AMOXICILLIN 250 MG
1 CAPSULE ORAL 2 TIMES DAILY PRN
Status: DISCONTINUED | OUTPATIENT
Start: 2021-11-24 | End: 2021-11-24 | Stop reason: HOSPADM

## 2021-11-23 RX ORDER — LIDOCAINE HYDROCHLORIDE 10 MG/ML
10 INJECTION, SOLUTION EPIDURAL; INFILTRATION; INTRACAUDAL; PERINEURAL
Status: COMPLETED | OUTPATIENT
Start: 2021-11-23 | End: 2021-11-23

## 2021-11-23 RX ORDER — ONDANSETRON 4 MG/1
8 TABLET, ORALLY DISINTEGRATING ORAL EVERY 8 HOURS PRN
Status: DISCONTINUED | OUTPATIENT
Start: 2021-11-24 | End: 2021-11-24 | Stop reason: HOSPADM

## 2021-11-23 RX ORDER — GLUCAGON 1 MG
1 KIT INJECTION
Status: DISCONTINUED | OUTPATIENT
Start: 2021-11-24 | End: 2021-11-24 | Stop reason: HOSPADM

## 2021-11-23 RX ORDER — INSULIN ASPART 100 [IU]/ML
1-10 INJECTION, SOLUTION INTRAVENOUS; SUBCUTANEOUS
Status: DISCONTINUED | OUTPATIENT
Start: 2021-11-24 | End: 2021-11-24 | Stop reason: HOSPADM

## 2021-11-23 RX ORDER — IBUPROFEN 200 MG
24 TABLET ORAL
Status: DISCONTINUED | OUTPATIENT
Start: 2021-11-24 | End: 2021-11-24 | Stop reason: HOSPADM

## 2021-11-23 RX ORDER — CHLORTHALIDONE 25 MG/1
50 TABLET ORAL DAILY
Status: DISCONTINUED | OUTPATIENT
Start: 2021-11-24 | End: 2021-11-24 | Stop reason: HOSPADM

## 2021-11-23 RX ORDER — HYDROCODONE BITARTRATE AND ACETAMINOPHEN 10; 325 MG/1; MG/1
1 TABLET ORAL 2 TIMES DAILY PRN
Status: DISCONTINUED | OUTPATIENT
Start: 2021-11-24 | End: 2021-11-24 | Stop reason: HOSPADM

## 2021-11-23 RX ORDER — NAPROXEN SODIUM 220 MG/1
81 TABLET, FILM COATED ORAL DAILY
Status: DISCONTINUED | OUTPATIENT
Start: 2021-11-24 | End: 2021-11-24

## 2021-11-23 RX ORDER — IBUPROFEN 200 MG
16 TABLET ORAL
Status: DISCONTINUED | OUTPATIENT
Start: 2021-11-24 | End: 2021-11-24 | Stop reason: HOSPADM

## 2021-11-23 RX ORDER — METOPROLOL SUCCINATE 50 MG/1
100 TABLET, EXTENDED RELEASE ORAL DAILY
Status: DISCONTINUED | OUTPATIENT
Start: 2021-11-24 | End: 2021-11-24 | Stop reason: HOSPADM

## 2021-11-23 RX ORDER — SODIUM CHLORIDE 0.9 % (FLUSH) 0.9 %
10 SYRINGE (ML) INJECTION
Status: DISCONTINUED | OUTPATIENT
Start: 2021-11-24 | End: 2021-11-24 | Stop reason: HOSPADM

## 2021-11-23 RX ORDER — ATORVASTATIN CALCIUM 40 MG/1
40 TABLET, FILM COATED ORAL DAILY
Status: DISCONTINUED | OUTPATIENT
Start: 2021-11-24 | End: 2021-11-24 | Stop reason: HOSPADM

## 2021-11-23 RX ORDER — ACETAMINOPHEN 325 MG/1
650 TABLET ORAL EVERY 4 HOURS PRN
Status: DISCONTINUED | OUTPATIENT
Start: 2021-11-24 | End: 2021-11-24 | Stop reason: HOSPADM

## 2021-11-23 RX ORDER — HYDRALAZINE HYDROCHLORIDE 25 MG/1
100 TABLET, FILM COATED ORAL 3 TIMES DAILY
Status: DISCONTINUED | OUTPATIENT
Start: 2021-11-24 | End: 2021-11-24 | Stop reason: HOSPADM

## 2021-11-23 RX ORDER — HYDROCODONE BITARTRATE AND ACETAMINOPHEN 10; 325 MG/1; MG/1
1 TABLET ORAL 2 TIMES DAILY PRN
Status: ON HOLD | COMMUNITY
Start: 2021-11-05 | End: 2022-01-07 | Stop reason: HOSPADM

## 2021-11-23 RX ORDER — VANCOMYCIN HCL IN 5 % DEXTROSE 1G/250ML
1000 PLASTIC BAG, INJECTION (ML) INTRAVENOUS ONCE
Status: COMPLETED | OUTPATIENT
Start: 2021-11-23 | End: 2021-11-23

## 2021-11-23 RX ORDER — HYDRALAZINE HYDROCHLORIDE 25 MG/1
100 TABLET, FILM COATED ORAL
Status: COMPLETED | OUTPATIENT
Start: 2021-11-23 | End: 2021-11-23

## 2021-11-23 RX ORDER — NALOXONE HCL 0.4 MG/ML
0.02 VIAL (ML) INJECTION
Status: DISCONTINUED | OUTPATIENT
Start: 2021-11-24 | End: 2021-11-24 | Stop reason: HOSPADM

## 2021-11-23 RX ORDER — HYDRALAZINE HYDROCHLORIDE 25 MG/1
100 TABLET, FILM COATED ORAL
Status: DISCONTINUED | OUTPATIENT
Start: 2021-11-23 | End: 2021-11-23

## 2021-11-23 RX ADMIN — PIPERACILLIN AND TAZOBACTAM 4.5 G: 4; .5 INJECTION, POWDER, LYOPHILIZED, FOR SOLUTION INTRAVENOUS; PARENTERAL at 09:11

## 2021-11-23 RX ADMIN — HYDRALAZINE HYDROCHLORIDE 10 MG: 20 INJECTION, SOLUTION INTRAMUSCULAR; INTRAVENOUS at 07:11

## 2021-11-23 RX ADMIN — VANCOMYCIN HYDROCHLORIDE 1000 MG: 1 INJECTION, POWDER, LYOPHILIZED, FOR SOLUTION INTRAVENOUS at 10:11

## 2021-11-23 RX ADMIN — LIDOCAINE HYDROCHLORIDE 100 MG: 10 INJECTION, SOLUTION EPIDURAL; INFILTRATION; INTRACAUDAL; PERINEURAL at 07:11

## 2021-11-23 RX ADMIN — MUPIROCIN: 20 OINTMENT TOPICAL at 07:11

## 2021-11-23 RX ADMIN — HYDRALAZINE HYDROCHLORIDE 100 MG: 25 TABLET ORAL at 08:11

## 2021-11-24 VITALS
RESPIRATION RATE: 19 BRPM | WEIGHT: 130 LBS | HEIGHT: 59 IN | SYSTOLIC BLOOD PRESSURE: 158 MMHG | OXYGEN SATURATION: 96 % | DIASTOLIC BLOOD PRESSURE: 81 MMHG | BODY MASS INDEX: 26.21 KG/M2 | HEART RATE: 80 BPM | TEMPERATURE: 98 F

## 2021-11-24 LAB
ANION GAP SERPL CALC-SCNC: 11 MMOL/L (ref 8–16)
BASOPHILS # BLD AUTO: 0.06 K/UL (ref 0–0.2)
BASOPHILS NFR BLD: 0.6 % (ref 0–1.9)
BUN SERPL-MCNC: 12 MG/DL (ref 8–23)
CALCIUM SERPL-MCNC: 8.9 MG/DL (ref 8.7–10.5)
CHLORIDE SERPL-SCNC: 99 MMOL/L (ref 95–110)
CO2 SERPL-SCNC: 24 MMOL/L (ref 23–29)
CREAT SERPL-MCNC: 0.9 MG/DL (ref 0.5–1.4)
DIFFERENTIAL METHOD: ABNORMAL
EOSINOPHIL # BLD AUTO: 0.2 K/UL (ref 0–0.5)
EOSINOPHIL NFR BLD: 2 % (ref 0–8)
ERYTHROCYTE [DISTWIDTH] IN BLOOD BY AUTOMATED COUNT: 14 % (ref 11.5–14.5)
EST. GFR  (AFRICAN AMERICAN): >60 ML/MIN/1.73 M^2
EST. GFR  (NON AFRICAN AMERICAN): >60 ML/MIN/1.73 M^2
ESTIMATED AVG GLUCOSE: 378 MG/DL (ref 68–131)
GLUCOSE SERPL-MCNC: 170 MG/DL (ref 70–110)
GLUCOSE SERPL-MCNC: 234 MG/DL (ref 70–110)
GLUCOSE SERPL-MCNC: 241 MG/DL (ref 70–110)
GLUCOSE SERPL-MCNC: 290 MG/DL (ref 70–110)
GLUCOSE SERPL-MCNC: 481 MG/DL (ref 70–110)
HBA1C MFR BLD: 14.8 % (ref 4.5–6.2)
HCT VFR BLD AUTO: 33.2 % (ref 37–48.5)
HGB BLD-MCNC: 11.6 G/DL (ref 12–16)
IMM GRANULOCYTES # BLD AUTO: 0.06 K/UL (ref 0–0.04)
IMM GRANULOCYTES NFR BLD AUTO: 0.6 % (ref 0–0.5)
LYMPHOCYTES # BLD AUTO: 2.7 K/UL (ref 1–4.8)
LYMPHOCYTES NFR BLD: 24.9 % (ref 18–48)
MAGNESIUM SERPL-MCNC: 1.6 MG/DL (ref 1.6–2.6)
MCH RBC QN AUTO: 27.2 PG (ref 27–31)
MCHC RBC AUTO-ENTMCNC: 34.9 G/DL (ref 32–36)
MCV RBC AUTO: 78 FL (ref 82–98)
MONOCYTES # BLD AUTO: 1 K/UL (ref 0.3–1)
MONOCYTES NFR BLD: 9.6 % (ref 4–15)
NEUTROPHILS # BLD AUTO: 6.7 K/UL (ref 1.8–7.7)
NEUTROPHILS NFR BLD: 62.3 % (ref 38–73)
NRBC BLD-RTO: 0 /100 WBC
PLATELET # BLD AUTO: 479 K/UL (ref 150–450)
PMV BLD AUTO: 11 FL (ref 9.2–12.9)
POTASSIUM SERPL-SCNC: 3.2 MMOL/L (ref 3.5–5.1)
RBC # BLD AUTO: 4.26 M/UL (ref 4–5.4)
SODIUM SERPL-SCNC: 134 MMOL/L (ref 136–145)
WBC # BLD AUTO: 10.65 K/UL (ref 3.9–12.7)

## 2021-11-24 PROCEDURE — C9399 UNCLASSIFIED DRUGS OR BIOLOG: HCPCS | Performed by: NURSE PRACTITIONER

## 2021-11-24 PROCEDURE — 80048 BASIC METABOLIC PNL TOTAL CA: CPT | Performed by: NURSE PRACTITIONER

## 2021-11-24 PROCEDURE — 63600175 PHARM REV CODE 636 W HCPCS: Performed by: NURSE PRACTITIONER

## 2021-11-24 PROCEDURE — 83036 HEMOGLOBIN GLYCOSYLATED A1C: CPT | Performed by: NURSE PRACTITIONER

## 2021-11-24 PROCEDURE — 85025 COMPLETE CBC W/AUTO DIFF WBC: CPT | Performed by: NURSE PRACTITIONER

## 2021-11-24 PROCEDURE — 36415 COLL VENOUS BLD VENIPUNCTURE: CPT | Performed by: NURSE PRACTITIONER

## 2021-11-24 PROCEDURE — 63600175 PHARM REV CODE 636 W HCPCS: Performed by: INTERNAL MEDICINE

## 2021-11-24 PROCEDURE — 99223 PR INITIAL HOSPITAL CARE,LEVL III: ICD-10-PCS | Mod: ,,, | Performed by: ORTHOPAEDIC SURGERY

## 2021-11-24 PROCEDURE — 99900035 HC TECH TIME PER 15 MIN (STAT)

## 2021-11-24 PROCEDURE — 25500020 PHARM REV CODE 255: Performed by: INTERNAL MEDICINE

## 2021-11-24 PROCEDURE — 25000003 PHARM REV CODE 250: Performed by: INTERNAL MEDICINE

## 2021-11-24 PROCEDURE — 99222 PR INITIAL HOSPITAL CARE,LEVL II: ICD-10-PCS | Mod: ,,, | Performed by: INTERNAL MEDICINE

## 2021-11-24 PROCEDURE — A9585 GADOBUTROL INJECTION: HCPCS | Performed by: INTERNAL MEDICINE

## 2021-11-24 PROCEDURE — 99222 1ST HOSP IP/OBS MODERATE 55: CPT | Mod: ,,, | Performed by: INTERNAL MEDICINE

## 2021-11-24 PROCEDURE — 97116 GAIT TRAINING THERAPY: CPT

## 2021-11-24 PROCEDURE — C9399 UNCLASSIFIED DRUGS OR BIOLOG: HCPCS | Performed by: INTERNAL MEDICINE

## 2021-11-24 PROCEDURE — 25000003 PHARM REV CODE 250: Performed by: NURSE PRACTITIONER

## 2021-11-24 PROCEDURE — 83735 ASSAY OF MAGNESIUM: CPT | Performed by: NURSE PRACTITIONER

## 2021-11-24 PROCEDURE — 99223 1ST HOSP IP/OBS HIGH 75: CPT | Mod: ,,, | Performed by: ORTHOPAEDIC SURGERY

## 2021-11-24 PROCEDURE — 97161 PT EVAL LOW COMPLEX 20 MIN: CPT

## 2021-11-24 RX ORDER — DOXYCYCLINE 100 MG/1
100 CAPSULE ORAL 2 TIMES DAILY
Qty: 28 CAPSULE | Refills: 0 | Status: SHIPPED | OUTPATIENT
Start: 2021-11-24 | End: 2021-12-08

## 2021-11-24 RX ORDER — MAGNESIUM SULFATE 1 G/100ML
1 INJECTION INTRAVENOUS
Status: DISCONTINUED | OUTPATIENT
Start: 2021-11-24 | End: 2021-11-24 | Stop reason: HOSPADM

## 2021-11-24 RX ORDER — MAGNESIUM SULFATE HEPTAHYDRATE 40 MG/ML
2 INJECTION, SOLUTION INTRAVENOUS
Status: DISCONTINUED | OUTPATIENT
Start: 2021-11-24 | End: 2021-11-24 | Stop reason: HOSPADM

## 2021-11-24 RX ORDER — LANOLIN ALCOHOL/MO/W.PET/CERES
800 CREAM (GRAM) TOPICAL
Status: DISCONTINUED | OUTPATIENT
Start: 2021-11-24 | End: 2021-11-24 | Stop reason: HOSPADM

## 2021-11-24 RX ORDER — POTASSIUM CHLORIDE 20 MEQ/1
20 TABLET, EXTENDED RELEASE ORAL
Status: DISCONTINUED | OUTPATIENT
Start: 2021-11-24 | End: 2021-11-24 | Stop reason: HOSPADM

## 2021-11-24 RX ORDER — CLONIDINE 0.1 MG/24H
1 PATCH, EXTENDED RELEASE TRANSDERMAL
Status: DISCONTINUED | OUTPATIENT
Start: 2021-11-24 | End: 2021-11-24 | Stop reason: HOSPADM

## 2021-11-24 RX ORDER — POTASSIUM CHLORIDE 7.45 MG/ML
20 INJECTION INTRAVENOUS
Status: DISCONTINUED | OUTPATIENT
Start: 2021-11-24 | End: 2021-11-24 | Stop reason: HOSPADM

## 2021-11-24 RX ORDER — POTASSIUM CHLORIDE 20 MEQ/1
40 TABLET, EXTENDED RELEASE ORAL EVERY 4 HOURS
Status: DISCONTINUED | OUTPATIENT
Start: 2021-11-24 | End: 2021-11-24 | Stop reason: HOSPADM

## 2021-11-24 RX ORDER — POTASSIUM CHLORIDE 7.45 MG/ML
40 INJECTION INTRAVENOUS
Status: DISCONTINUED | OUTPATIENT
Start: 2021-11-24 | End: 2021-11-24 | Stop reason: HOSPADM

## 2021-11-24 RX ORDER — CLONIDINE 0.1 MG/24H
1 PATCH, EXTENDED RELEASE TRANSDERMAL
Qty: 4 PATCH | Refills: 11 | Status: SHIPPED | OUTPATIENT
Start: 2021-12-01 | End: 2022-02-01 | Stop reason: SDUPTHER

## 2021-11-24 RX ORDER — POTASSIUM CHLORIDE 20 MEQ/1
40 TABLET, EXTENDED RELEASE ORAL
Status: DISCONTINUED | OUTPATIENT
Start: 2021-11-24 | End: 2021-11-24 | Stop reason: HOSPADM

## 2021-11-24 RX ORDER — INSULIN DEGLUDEC 100 U/ML
40 INJECTION, SOLUTION SUBCUTANEOUS DAILY
Qty: 11 PEN | Refills: 3 | Status: SHIPPED | OUTPATIENT
Start: 2021-11-24 | End: 2022-02-01 | Stop reason: SDUPTHER

## 2021-11-24 RX ORDER — MAGNESIUM SULFATE 1 G/100ML
1 INJECTION INTRAVENOUS ONCE
Status: COMPLETED | OUTPATIENT
Start: 2021-11-24 | End: 2021-11-24

## 2021-11-24 RX ORDER — GADOBUTROL 604.72 MG/ML
5 INJECTION INTRAVENOUS
Status: COMPLETED | OUTPATIENT
Start: 2021-11-24 | End: 2021-11-24

## 2021-11-24 RX ORDER — MAGNESIUM SULFATE HEPTAHYDRATE 40 MG/ML
4 INJECTION, SOLUTION INTRAVENOUS
Status: DISCONTINUED | OUTPATIENT
Start: 2021-11-24 | End: 2021-11-24 | Stop reason: HOSPADM

## 2021-11-24 RX ADMIN — PIPERACILLIN AND TAZOBACTAM 3.38 G: 3; .375 INJECTION, POWDER, LYOPHILIZED, FOR SOLUTION INTRAVENOUS; PARENTERAL at 04:11

## 2021-11-24 RX ADMIN — HYDRALAZINE HYDROCHLORIDE 100 MG: 25 TABLET ORAL at 09:11

## 2021-11-24 RX ADMIN — MAGNESIUM SULFATE 1 G: 1 INJECTION INTRAVENOUS at 01:11

## 2021-11-24 RX ADMIN — HYDRALAZINE HYDROCHLORIDE 100 MG: 25 TABLET ORAL at 03:11

## 2021-11-24 RX ADMIN — CHLORTHALIDONE 50 MG: 25 TABLET ORAL at 09:11

## 2021-11-24 RX ADMIN — HUMAN INSULIN 6 UNITS: 100 INJECTION, SOLUTION SUBCUTANEOUS at 12:11

## 2021-11-24 RX ADMIN — POTASSIUM CHLORIDE 40 MEQ: 1500 TABLET, EXTENDED RELEASE ORAL at 01:11

## 2021-11-24 RX ADMIN — METOPROLOL SUCCINATE 100 MG: 50 TABLET, FILM COATED, EXTENDED RELEASE ORAL at 09:11

## 2021-11-24 RX ADMIN — CEFTRIAXONE 1 G: 1 INJECTION, SOLUTION INTRAVENOUS at 01:11

## 2021-11-24 RX ADMIN — INSULIN DETEMIR 18 UNITS: 100 INJECTION, SOLUTION SUBCUTANEOUS at 12:11

## 2021-11-24 RX ADMIN — CLONIDINE 1 PATCH: 0.1 PATCH TRANSDERMAL at 12:11

## 2021-11-24 RX ADMIN — SODIUM CHLORIDE 500 ML: 0.9 INJECTION, SOLUTION INTRAVENOUS at 12:11

## 2021-11-24 RX ADMIN — INSULIN ASPART 6 UNITS: 100 INJECTION, SOLUTION INTRAVENOUS; SUBCUTANEOUS at 12:11

## 2021-11-24 RX ADMIN — ACETAMINOPHEN 650 MG: 325 TABLET, FILM COATED ORAL at 12:11

## 2021-11-24 RX ADMIN — ATORVASTATIN CALCIUM 40 MG: 40 TABLET, FILM COATED ORAL at 09:11

## 2021-11-24 RX ADMIN — MUPIROCIN: 20 OINTMENT TOPICAL at 09:11

## 2021-11-24 RX ADMIN — GADOBUTROL 5 ML: 604.72 INJECTION INTRAVENOUS at 07:11

## 2021-11-24 RX ADMIN — INSULIN ASPART 4 UNITS: 100 INJECTION, SOLUTION INTRAVENOUS; SUBCUTANEOUS at 09:11

## 2021-11-24 RX ADMIN — HYDRALAZINE HYDROCHLORIDE 100 MG: 25 TABLET ORAL at 12:11

## 2021-11-24 NOTE — PLAN OF CARE
Chart and discharge orders reviewed.  contacted patients sister Gita Johnson [phone] for transportation from hospital. Patient discharged home with no further case management needs.       11/24/21 1545   Final Note   Assessment Type Final Discharge Note   Anticipated Discharge Disposition Home   What phone number can be called within the next 1-3 days to see how you are doing after discharge? [phone]   Post-Acute Status   Discharge Delays None known at this time

## 2021-11-24 NOTE — PT/OT/SLP EVAL
Physical Therapy Evaluation    Patient Name:  Steff Johnson   MRN:  [id number]    Recommendations:     Discharge Recommendations:  home health PT   Discharge Equipment Recommendations: none   Barriers to discharge: increase assist with mobility    Assessment:     Steff Johnson is a 61 y.o. female admitted with a medical diagnosis of Osteomyelitis of finger of left hand.  She presents with the following impairments/functional limitations:  weakness,impaired endurance,impaired self care skills,impaired functional mobilty,gait instability,impaired balance,decreased lower extremity function,decreased safety awareness,impaired cardiopulmonary response to activity.    Pt found in bed with significant other and extender at bedside. Pt agreeable to visit. Pt performed bed mobility with independent. Sit to stand with supervision. Pt ambulated 75 ft with RW and CGA.no loss of balance and moderate RW management.     Rehab Prognosis: Fair; patient would benefit from acute skilled PT services to address these deficits and reach maximum level of function.    Recent Surgery: * No surgery found *      Plan:     During this hospitalization, patient to be seen 5 x/week to address the identified rehab impairments via gait training,therapeutic activities,therapeutic exercises,neuromuscular re-education and progress toward the following goals:    · Plan of Care Expires:  12/24/21    Subjective     Chief Complaint: none  Patient/Family Comments/goals: return home  Pain/Comfort:  · Pain Rating 1: 0/10    Patients cultural, spiritual, Tenriism conflicts given the current situation: no    Living Environment:  Pt lives with her significant other in a trailer with with 5 KURT and BHR.   Prior to admission, patients level of function was MI SPC.  Equipment used at home: cane, straight,rollator,walker, rolling.  DME owned (not currently used): none.  Upon discharge, patient will have assistance from significant other.    Objective:      Communicated with RN prior to session.  Patient found HOB elevated with peripheral IV,telemetry  upon PT entry to room.    General Precautions: Standard, fall   Orthopedic Precautions:N/A   Braces: N/A  Respiratory Status:  · O2 Device (Oxygen Therapy): room air    Exams:  · Cognitive Exam:  Patient is oriented to Person, Place, Time and Situation  · RLE ROM: WFL  · RLE Strength: WFL  · LLE ROM: WFL  · LLE Strength: WFL    Functional Mobility:  · Bed Mobility:     · Supine to Sit: independence  · Transfers:     · Sit to Stand:  supervision with rolling walker  · Gait: 75 ft with RW and CGA    Therapeutic Activities and Exercises:  Pt educated on POC, discharge recommendation, importance of time OOB, need for assist with mobility, use of call bell to seek assistance as needed and fall prevention      AM-PAC 6 CLICK MOBILITY  Total Score:22     Patient left up in chair with all lines intact, call button in reach and chair alarm on.    GOALS:   Multidisciplinary Problems     Physical Therapy Goals        Problem: Physical Therapy Goal    Goal Priority Disciplines Outcome Goal Variances Interventions   Physical Therapy Goal     PT, PT/OT Ongoing, Progressing     Description: Goals to be met by: Discharge     Patient will increase functional independence with mobility by performin. Sit to stand transfer with Supervision  2. Bed to chair transfer with Supervision using least restrictive assistive device  3. Gait  x 150 feet with Supervision using least restrictive assistive device  4. Asc/Desc 5 steps with bilateral HR and supervision                           History:     Past Medical History:   Diagnosis Date    Arthritis     Complete tear of left rotator cuff 2017    COPD (chronic obstructive pulmonary disease)     COVID-19 infection 2021    Diabetes mellitus     H/o Cerebrovascular accident (CVA) of left pontine structure 2019    Hypertension     Stroke     Wears glasses        Past  Surgical History:   Procedure Laterality Date    COLONOSCOPY N/A 4/11/2017    Procedure: COLONOSCOPY;  Surgeon: Jared Antonio MD;  Location: [facility];  Service: Endoscopy;  Laterality: N/A;    HYSTERECTOMY      OOPHORECTOMY      SHOULDER SURGERY      R       Time Tracking:     PT Received On: 11/24/21  PT Start Time: 1111     PT Stop Time: 1124  PT Total Time (min): 13 min     Billable Minutes: Evaluation 5 and Gait Training 8      11/24/2021

## 2021-11-24 NOTE — PROGRESS NOTES
Pharmacokinetic Initial Assessment: IV Vancomycin    Assessment/Plan:    Initiate intravenous vancomycin with loading dose of 1000 mg once followed by a maintenance dose of vancomycin 1000 mg IV every 24 hours  Desired empiric serum trough concentration is 10 to 15 mcg/mL  Draw vancomycin trough level 60 min prior to fourth dose on 11/26 at approximately 21:00  Pharmacy will continue to follow and monitor vancomycin.      Please contact pharmacy at extension [phone] with any questions regarding this assessment.     Thank you for the consult,   Jayne Leija       Patient brief summary:  Steff Johnson is a 61 y.o. female initiated on antimicrobial therapy with IV Vancomycin for treatment of suspected skin & soft tissue infection    Drug Allergies:   Review of patient's allergies indicates:  No Known Allergies    Actual Body Weight:   59 kg    Renal Function:   Estimated Creatinine Clearance: 43.7 mL/min (based on SCr of 1.1 mg/dL).,     Dialysis Method (if applicable):  N/A    CBC (last 72 hours):  Recent Labs   Lab Result Units 11/23/21  1820   WBC K/uL 8.64   Hemoglobin g/dL 12.3   Hematocrit % 35.4*   Platelets K/uL 471*   Gran % % 58.4   Lymph % % 27.9   Mono % % 9.4   Eosinophil % % 3.1   Basophil % % 0.7   Differential Method  Automated       Metabolic Panel (last 72 hours):  Recent Labs   Lab Result Units 11/23/21  1820   Sodium mmol/L 132*   Potassium mmol/L 4.4   Chloride mmol/L 95   CO2 mmol/L 27   Glucose mg/dL 472*   BUN mg/dL 14   Creatinine mg/dL 1.1   Albumin g/dL 3.7   Total Bilirubin mg/dL 0.8   Alkaline Phosphatase U/L 82   AST U/L 14   ALT U/L 11       Drug levels (last 3 results):  No results for input(s): VANCOMYCINRA, VANCOMYCINPE, VANCOMYCINTR in the last 72 hours.    Microbiologic Results:  Microbiology Results (last 7 days)       Procedure Component Value Units Date/Time    Blood Culture #2 **CANNOT BE ORDERED STAT** [[id number]] Collected: 11/23/21 2118    Order Status: Sent Specimen: Blood from  Peripheral, Antecubital, Right Updated: 11/23/21 2133    Blood Culture #1 **CANNOT BE ORDERED STAT** [[id number]] Collected: 11/23/21 2103    Order Status: Sent Specimen: Blood from Peripheral, Antecubital, Right Updated: 11/23/21 2132    Aerobic culture [[id number]] Collected: 11/23/21 2025    Order Status: Sent Specimen: Wound from Finger, Left Hand Updated: 11/23/21 2056

## 2021-11-24 NOTE — CONSULTS
[facility]  Consult Note  11/24/2021      Past Medical History:   Diagnosis Date    Arthritis     Complete tear of left rotator cuff 11/9/2017    COPD (chronic obstructive pulmonary disease)     COVID-19 infection 7/2/2021    Diabetes mellitus     H/o Cerebrovascular accident (CVA) of left pontine structure 12/12/2019    Hypertension     Stroke     Wears glasses        Past Surgical History:   Procedure Laterality Date    COLONOSCOPY N/A 4/11/2017    Procedure: COLONOSCOPY;  Surgeon: Jared Antonio MD;  Location: [facility];  Service: Endoscopy;  Laterality: N/A;    HYSTERECTOMY      OOPHORECTOMY      SHOULDER SURGERY      R         Current Facility-Administered Medications:     acetaminophen tablet 650 mg, 650 mg, Oral, Q4H PRN, Keeley Lutz NP, 650 mg at 11/24/21 0006    atorvastatin tablet 40 mg, 40 mg, Oral, Daily, Keeley Lutz NP, 40 mg at 11/24/21 0915    calcium chloride 1 g in dextrose 5 % 100 mL IVPB, 1 g, Intravenous, PRN, Keeley Lutz NP    calcium chloride 1 g in dextrose 5 % 100 mL IVPB, 1 g, Intravenous, PRN, Keeley Lutz NP    calcium chloride 1 g in dextrose 5 % 100 mL IVPB, 1 g, Intravenous, PRN, Keeley Lutz NP    cefTRIAXone (ROCEPHIN) 1 g/50 mL D5W IVPB, 1 g, Intravenous, Q24H, Silvana Smith MD    chlorthalidone tablet 50 mg, 50 mg, Oral, Daily, Keeley Lutz NP, 50 mg at 11/24/21 0915    cloNIDine 0.1 mg/24 hr td ptwk 1 patch, 1 patch, Transdermal, Q7 Days, Messi Rice MD    dextrose 50% injection 12.5 g, 12.5 g, Intravenous, PRN, Keeley Lutz NP    dextrose 50% injection 25 g, 25 g, Intravenous, PRN, Keeley Lutz NP    glucagon (human recombinant) injection 1 mg, 1 mg, Intramuscular, PRN, Keeley Lutz NP    glucose chewable tablet 16 g, 16 g, Oral, PRN, Keeley Lutz NP    glucose chewable tablet 24 g, 24 g, Oral, PRN, Keeley Lutz NP    hydrALAZINE tablet 100 mg, 100 mg,  Oral, TID, Keeley Lutz NP, 100 mg at 11/24/21 0915    HYDROcodone-acetaminophen  mg per tablet 1 tablet, 1 tablet, Oral, BID PRN, Keeley Lutz NP    insulin aspart U-100 pen 1-10 Units, 1-10 Units, Subcutaneous, QID (AC + HS) PRN, Keeley Lutz NP, 4 Units at 11/24/21 0927    insulin detemir U-100 pen 18 Units, 18 Units, Subcutaneous, BID, Messi Rice MD    insulin regular 100 unit/mL injection, , , ,     magnesium oxide tablet 800 mg, 800 mg, Oral, PRN, Keeley Lutz NP    magnesium sulfate 2g in water 50mL IVPB (premix), 2 g, Intravenous, PRN, Keeley Lutz NP    magnesium sulfate 2g in water 50mL IVPB (premix), 4 g, Intravenous, PRN, Keeley Lutz NP    magnesium sulfate 2g in water 50mL IVPB (premix), 2 g, Intravenous, PRN, Keeley Lutz NP    magnesium sulfate in dextrose IVPB (premix) 1 g, 1 g, Intravenous, PRN, Keeley Lutz NP    magnesium sulfate in dextrose IVPB (premix) 1 g, 1 g, Intravenous, Once, Messi Rice MD    metoprolol succinate (TOPROL-XL) 24 hr tablet 100 mg, 100 mg, Oral, Daily, Keeley Lutz NP, 100 mg at 11/24/21 0915    mupirocin 2 % ointment, , Topical (Top), Daily, Gato Sepulveda MD, Given at 11/24/21 0923    naloxone 0.4 mg/mL injection 0.02 mg, 0.02 mg, Intravenous, PRN, Keeley Lutz NP    ondansetron disintegrating tablet 8 mg, 8 mg, Oral, Q8H PRN, Keeley Lutz NP    potassium chloride 10 mEq in 100 mL IVPB, 20 mEq, Intravenous, PRN, Keeley Lutz NP    potassium chloride 10 mEq in 100 mL IVPB, 40 mEq, Intravenous, PRN, Keeley Lutz NP    potassium chloride 10 mEq in 100 mL IVPB, 20 mEq, Intravenous, PRN, Keeley Lutz NP    potassium chloride 10 mEq in 100 mL IVPB, 40 mEq, Intravenous, PRN, Keeley Lutz, NP    potassium chloride SA CR tablet 20 mEq, 20 mEq, Oral, PRN, Keeley Lutz NP    potassium chloride SA CR tablet 20 mEq, 20 mEq, Oral, PRN, Keeley  SHWETA Lutz    potassium chloride SA CR tablet 40 mEq, 40 mEq, Oral, PRN, Keeley Lutz NP    potassium chloride SA CR tablet 40 mEq, 40 mEq, Oral, PRN, Keeley Lutz NP    potassium chloride SA CR tablet 40 mEq, 40 mEq, Oral, Q4H, Messi Rice MD    senna-docusate 8.6-50 mg per tablet 1 tablet, 1 tablet, Oral, BID PRN, Keeley Lutz NP    sodium chloride 0.9% flush 10 mL, 10 mL, Intravenous, PRN, Keeley Lutz NP    tiotropium bromide 2.5 mcg/actuation inhaler 2 puff, 2 puff, Inhalation, Daily, Keeley Lutz NP    Pharmacy to dose Vancomycin consult, , , Once **AND** vancomycin - pharmacy to dose, , Intravenous, pharmacy to manage frequency, Keeley Lutz NP    vancomycin in dextrose 5 % 1 gram/250 mL IVPB 1,000 mg, 1,000 mg, Intravenous, Q24H, Keeley Lutz NP    Allergies as of 11/23/2021    (No Known Allergies)       Family History   Problem Relation Age of Onset    Diabetes Mother     Asthma Mother     Hypertension Mother     Diabetes Father     Diabetes Brother     Hypertension Brother     Anemia Daughter     Glaucoma Neg Hx     Macular degeneration Neg Hx     Retinal detachment Neg Hx        Social History     Socioeconomic History    Marital status: Single   Occupational History     Comment: disabled due to shoulder problems   Tobacco Use    Smoking status: Never Smoker    Smokeless tobacco: Never Used   Substance and Sexual Activity    Alcohol use: No    Drug use: No    Sexual activity: Not Currently         HPI:  The patient is a 61-year-old female who was seen in emergency room yesterday with infected paronychia infection to the left long finger it was high and deed in the emergency room patient has a history of diabetes hypertension patient is presently on IV antibiotics .      Review of Systems   Constitution: Negative for chills and fever.   HENT: Negative for headaches and blurry vision.   Cardiovascular: Negative for chest pain,  irregular heartbeat, leg swelling and palpitations.   Respiratory: Negative for cough and shortness of breath.   Endocrine: Negative for polyuria.   Hematologic/Lymphatic: Negative for bleeding problem. Does not bruise/bleed easily.   Skin: Negative for poor wound healing and rash.   Musculoskeletal: Negative for joint pain. Negative for arthritis, joint swelling, muscle weakness and myalgias.   Gastrointestinal: Negative for abdominal pain, heartburn, melena, nausea and vomiting.   Genitourinary: Negative for bladder incontinence and dysuria.   Neurological: Negative for numbness.   Psychiatric/Behavioral: Negative for depression. The patient is not nervous/anxious.     PE:  Temp:  [97.9 °F (36.6 °C)-98.8 °F (37.1 °C)] 98.1 °F (36.7 °C)  Pulse:  [70-90] 80  Resp:  [16-20] 19  SpO2:  [95 %-100 %] 96 %  BP: (151-239)/() 158/81    A&Ox3, NAD  Neck-supple with no pain neurovascular intact in the upper extremities  RUE-no swelling or deformity  Pelvis-no pelvic pain  LUE-examination of the left long finger there is no gross pus present there is a previous I&D of this infected area patient denies any pain there is no drainage presently patient moves the finger well  Back-no back pain straight leg raise negative  RLE-no swelling or deformity  LLE-no swelling or deformity    Xrays:  X-ray of the left long finger shows a possible distal tuft fracture no evidence of any fracture or Rue near the paronychia area only some soft tissue swelling  Imaging Results          CT Maxillofacial Without Contrast (Final result)  Result time 11/23/21 19:26:54    Final result by Serg Brambila MD (11/23/21 19:26:54)                 Narrative:    CMS MANDATED QUALITY DATA - CT RADIATION 436    All CT scans at this facility utilize dose modulation, iterative reconstruction, and/or weight based dosing when appropriate to reduce radiation dose to as low as reasonably achievable.    CLINICAL HISTORY:  61 years (1960) Female fall w  facial trauma    TECHNIQUE:  CT MAXILLOFACIAL WITHOUT IV CONTRAST. 365 images obtained. Contiguous thin section axial images were obtained of the maxillofacial region were obtained. Sagittal and coronal reformatted images were generated.    COMPARISON:  CT most recently from July 2, 2021.    FINDINGS:  Soft tissue swelling over the right frontal bone/superior orbital rim with no acute skull fracture identified. There is an age-indeterminate left-sided nasal bone fracture (image 90), similar in appearance to the previous CT. The globes are within normal limits. There is no preseptal, post septal or intraconal abnormality. No findings to suggest retrobulbar hematoma. The mandible appears intact and the mandibular condyles are well seated within the temporal mandibular fossae. The visualized paranasal sinuses show scattered areas of mucosal thickening with no air-fluid level. The mastoid air cells are clear. There is periapical lucency along the left lateral incisor. The visualized skull base is unremarkable.    IMPRESSION:  Soft tissue swelling over the right frontal bone with no acute osseous abnormality identified as above.      Electronically signed by:  Serg Brambila MD  11/23/2021 7:26 PM CST Workstation: [id number]                             CT Head Without Contrast (Final result)  Result time 11/23/21 19:24:00    Final result by Serg Brambila MD (11/23/21 19:24:00)                 Narrative:    CMS MANDATED QUALITY DATA - CT RADIATION 436    All CT scans at this facility utilize dose modulation, iterative reconstruction, and/or weight based dosing when appropriate to reduce radiation dose to as low as reasonably achievable.    CLINICAL HISTORY:  61 years (1960) Female fall w facial trauma    TECHNIQUE:  CT HEAD WITHOUT IV CONTRAST. 102 images obtained. Axial CT of the brain without contrast using soft tissue and bone algorithm. .    COMPARISON:  None available.    FINDINGS:  Focal soft tissue  swelling over the right frontal bone with no acute intracranial hemorrhage, hydrocephalus, herniation or midline shift and the basal and suprasellar cisterns are patent. No acute skull fracture is identified.    Moderate periventricular deep cerebral white matter low attenuation, a nonspecific finding in this age group which can be seen in any diffuse white matter process but which is most commonly associated with chronic microvascular ischemic disease. Tiny rounded defects are seen in the basal ganglia compatible with prior lacunar infarcts (image 23). There are scattered atheromatous calcifications in the intracranial internal carotid arteries.    Orbital contents appear within normal limits. External auditory canals are unremarkable. The visualized paranasal sinuses and mastoid air cells are essentially clear.    IMPRESSION:  1. No acute intracranial process.  2. Chronic/involutional findings as noted above.                  .    Electronically signed by:  Serg Brambila MD  11/23/2021 7:24 PM CST Workstation: [id number]                             X-Ray Hand 3 view Left (Final result)  Result time 11/23/21 18:57:01    Final result by Serg Brambila MD (11/23/21 18:57:01)                 Narrative:    CLINICAL HISTORY:  61 years (1960) Female L finger injury with discoloration and pain, concern for fracture w infection L finger injury with discoloration and pain to distal end of 3rd digit, concern for fracture w infection    TECHNIQUE:  XR HAND 3 OR MORE VIEWS. 3 image(s) obtained.    COMPARISON:  None available.    FINDINGS:  No acute fracture or dislocation. The joints and interspaces are maintained. Focal soft tissue swelling over the tip of the third finger (middle finger) with no radiopaque foreign body identified. On the oblique radiograph, there is a tiny focal area of cortical discontinuity along the palmar aspect of the distal tuft (radial-side). No displaced fracture is identified, and the  joints and interspaces appear to be maintained, noting moderate osteoarthritic change in the distal interphalangeal joints of the third and fourth fingers.    IMPRESSION:  1. Marked focal soft tissue swelling over the tip of the middle finger.  2. Tiny focal area of cortical discontinuity along the distal tuft of the third finger, possibly representing a tiny chip fracture or osseous erosion from infection/osteomyelitis.                  .    Electronically signed by:  Serg Brambila MD  11/23/2021 6:57 PM CST Workstation: [id number]                              Labs:    Recent Results (from the past 24 hour(s))   POCT glucose    Collection Time: 11/23/21  5:51 PM   Result Value Ref Range    POC Glucose 464 (HH) 70 - 110   CBC auto differential    Collection Time: 11/23/21  6:20 PM   Result Value Ref Range    WBC 8.64 3.90 - 12.70 K/uL    RBC 4.54 4.00 - 5.40 M/uL    Hemoglobin 12.3 12.0 - 16.0 g/dL    Hematocrit 35.4 (L) 37.0 - 48.5 %    MCV 78 (L) 82 - 98 fL    MCH 27.1 27.0 - 31.0 pg    MCHC 34.7 32.0 - 36.0 g/dL    RDW 14.3 11.5 - 14.5 %    Platelets 471 (H) 150 - 450 K/uL    MPV 10.4 9.2 - 12.9 fL    Immature Granulocytes 0.5 0.0 - 0.5 %    Gran # (ANC) 5.1 1.8 - 7.7 K/uL    Immature Grans (Abs) 0.04 0.00 - 0.04 K/uL    Lymph # 2.4 1.0 - 4.8 K/uL    Mono # 0.8 0.3 - 1.0 K/uL    Eos # 0.3 0.0 - 0.5 K/uL    Baso # 0.06 0.00 - 0.20 K/uL    nRBC 0 0 /100 WBC    Gran % 58.4 38.0 - 73.0 %    Lymph % 27.9 18.0 - 48.0 %    Mono % 9.4 4.0 - 15.0 %    Eosinophil % 3.1 0.0 - 8.0 %    Basophil % 0.7 0.0 - 1.9 %    Differential Method Automated    Comprehensive metabolic panel    Collection Time: 11/23/21  6:20 PM   Result Value Ref Range    Sodium 132 (L) 136 - 145 mmol/L    Potassium 4.4 3.5 - 5.1 mmol/L    Chloride 95 95 - 110 mmol/L    CO2 27 23 - 29 mmol/L    Glucose 472 (HH) 70 - 110 mg/dL    BUN 14 8 - 23 mg/dL    Creatinine 1.1 0.5 - 1.4 mg/dL    Calcium 9.2 8.7 - 10.5 mg/dL    Total Protein 8.3 6.0 - 8.4 g/dL     Albumin 3.7 3.5 - 5.2 g/dL    Total Bilirubin 0.8 0.1 - 1.0 mg/dL    Alkaline Phosphatase 82 55 - 135 U/L    AST 14 10 - 40 U/L    ALT 11 10 - 44 U/L    Anion Gap 10 8 - 16 mmol/L    eGFR if African American >60.0 >60 mL/min/1.73 m^2    eGFR if non  53.6 (A) >60 mL/min/1.73 m^2   Protime-INR    Collection Time: 11/23/21  6:20 PM   Result Value Ref Range    PT 13.2 11.4 - 13.7 sec    INR 1.1    C-Reactive Protein    Collection Time: 11/23/21  6:20 PM   Result Value Ref Range    CRP 3.21 (H) <0.76 mg/dL   Sedimentation rate    Collection Time: 11/23/21  6:20 PM   Result Value Ref Range    Sed Rate 39 (H) 0 - 20 mm/Hr   Aerobic culture    Collection Time: 11/23/21  8:25 PM    Specimen: Finger, Left Hand; Wound   Result Value Ref Range    Aerobic Bacterial Culture No growth    Blood Culture #1 **CANNOT BE ORDERED STAT**    Collection Time: 11/23/21  9:03 PM    Specimen: Peripheral, Antecubital, Right; Blood   Result Value Ref Range    Blood Culture, Routine No Growth to date    Blood Culture #2 **CANNOT BE ORDERED STAT**    Collection Time: 11/23/21  9:18 PM    Specimen: Peripheral, Antecubital, Right; Blood   Result Value Ref Range    Blood Culture, Routine No Growth to date    COVID-19 Rapid Screening    Collection Time: 11/23/21  9:18 PM   Result Value Ref Range    SARS-CoV-2 RNA, Amplification, Qual Negative Negative   POCT glucose    Collection Time: 11/23/21 10:47 PM   Result Value Ref Range    POC Glucose 420 (H) 70 - 110   POCT glucose    Collection Time: 11/23/21 11:57 PM   Result Value Ref Range    POC Glucose 481 (HH) 70 - 110   POCT glucose    Collection Time: 11/24/21  1:56 AM   Result Value Ref Range    POC Glucose 234 (H) 70 - 110   Basic Metabolic Panel (BMP)    Collection Time: 11/24/21  5:59 AM   Result Value Ref Range    Sodium 134 (L) 136 - 145 mmol/L    Potassium 3.2 (L) 3.5 - 5.1 mmol/L    Chloride 99 95 - 110 mmol/L    CO2 24 23 - 29 mmol/L    Glucose 170 (H) 70 - 110 mg/dL    BUN  12 8 - 23 mg/dL    Creatinine 0.9 0.5 - 1.4 mg/dL    Calcium 8.9 8.7 - 10.5 mg/dL    Anion Gap 11 8 - 16 mmol/L    eGFR if African American >60.0 >60 mL/min/1.73 m^2    eGFR if non African American >60.0 >60 mL/min/1.73 m^2   Magnesium    Collection Time: 11/24/21  5:59 AM   Result Value Ref Range    Magnesium 1.6 1.6 - 2.6 mg/dL   Hemoglobin A1c    Collection Time: 11/24/21  5:59 AM   Result Value Ref Range    Hemoglobin A1C 14.8 (H) 4.5 - 6.2 %    Estimated Avg Glucose 378 (H) 68 - 131 mg/dL   CBC with Automated Differential    Collection Time: 11/24/21  5:59 AM   Result Value Ref Range    WBC 10.65 3.90 - 12.70 K/uL    RBC 4.26 4.00 - 5.40 M/uL    Hemoglobin 11.6 (L) 12.0 - 16.0 g/dL    Hematocrit 33.2 (L) 37.0 - 48.5 %    MCV 78 (L) 82 - 98 fL    MCH 27.2 27.0 - 31.0 pg    MCHC 34.9 32.0 - 36.0 g/dL    RDW 14.0 11.5 - 14.5 %    Platelets 479 (H) 150 - 450 K/uL    MPV 11.0 9.2 - 12.9 fL    Immature Granulocytes 0.6 (H) 0.0 - 0.5 %    Gran # (ANC) 6.7 1.8 - 7.7 K/uL    Immature Grans (Abs) 0.06 (H) 0.00 - 0.04 K/uL    Lymph # 2.7 1.0 - 4.8 K/uL    Mono # 1.0 0.3 - 1.0 K/uL    Eos # 0.2 0.0 - 0.5 K/uL    Baso # 0.06 0.00 - 0.20 K/uL    nRBC 0 0 /100 WBC    Gran % 62.3 38.0 - 73.0 %    Lymph % 24.9 18.0 - 48.0 %    Mono % 9.6 4.0 - 15.0 %    Eosinophil % 2.0 0.0 - 8.0 %    Basophil % 0.6 0.0 - 1.9 %    Differential Method Automated    POCT glucose    Collection Time: 11/24/21  9:27 AM   Result Value Ref Range    POC Glucose 241 (H) 70 - 110   POCT glucose    Collection Time: 11/24/21 11:12 AM   Result Value Ref Range    POC Glucose 290 (H) 70 - 110       Assessment/Plan:  Paronychia infection of the left long finger no evidence of any osteomyelitis possible mild distal tuft fracture.  recommend IV antibiotics followed by p.o. antibiotics by Infectious Disease.  the patient can follow up in our office in about 1 week

## 2021-11-24 NOTE — H&P
Critical access hospital Medicine History & Physical Examination   Patient Name: Steff Johnson  MRN: [id number]  Patient Class: Emergency   Admission Date: 11/23/2021  5:41 PM  Length of Stay: 0  Attending Physician: Nish Jean-Baptiste MD  Primary Care Provider: Cristina Ren MD  Face-to-Face encounter date: 11/23/2021  Code Status: full code  Chief Complaint: Fall (TRIP AND FALL OVER CAT, 2 DAYS AGO, NO LOC), Hand Injury (LEFT 3RD FINGER SWOLLEN), and Knee Injury (BOTH)          Patient information was obtained from patient, past medical records and ER records.   HISTORY OF PRESENT ILLNESS:   History was obtained from the patient and ER physician Sign-out.  Patient is a 61-year-old female with history of diabetes, hypertension, CKD, COPD, previous stroke who presents to the ED with a complaint of pain and edema to left middle finger that has been progressive for the past 2-3 days.  The patient reports she tripped and fell over there CAD about 4 days hitting her face on the door and she also jammed her left finger.  She states her finger became painful with swelling over the past 2 days.  She denies any drainage.  She denies loss of consciousness or visual changes.  The patient reports she has frequent falls with multiple scabs on bilateral lower extremities.    Patient denies change in vision, hearing, headache, fever, cough, congestion, runny nose, chest pain, shortness of breath, palpitations, abdominal pain, diarrhea, constipation, vomiting, dysuria, hematuria, numbness tingling or LOC..     In the ED patient is afebrile.  She is hypertensive 233/132 she was treated with hydralazine IV with improvement in her blood pressure.  CBC with normal white count.  Hematocrit 35.4 and MCV 78. Platelets 471  CMP with glucose 472, BUN 14, creatinine 1.1.  Inflammatory markers elevated  CT of head with no acute intracranial process.  CT of maxillofacial shows soft tissue swelling.  X-ray of left hand shows marked focal  soft tissue swelling over the tip of the middle finger.  Tiny focal area along the distal tuft of 3rd finger possibly representing chip fracture or osseous erosion from infection/osteomyelitis.  The patient is treated with IV antibiotics.  I&D of the left middle finger was done in the ED and culture sent.      REVIEW OF SYSTEMS:   10 Point Review of System was performed and was found to be negative except for that mentioned already in the HPI above.     PAST MEDICAL HISTORY:     Past Medical History:   Diagnosis Date    Arthritis     COPD (chronic obstructive pulmonary disease)     Diabetes mellitus     Hypertension     Stroke     Wears glasses        PAST SURGICAL HISTORY:     Past Surgical History:   Procedure Laterality Date    COLONOSCOPY N/A 4/11/2017    Procedure: COLONOSCOPY;  Surgeon: Jared Antonio MD;  Location: [facility];  Service: Endoscopy;  Laterality: N/A;    HYSTERECTOMY      OOPHORECTOMY      SHOULDER SURGERY      R       ALLERGIES:   Patient has no known allergies.    FAMILY HISTORY:     Family History   Problem Relation Age of Onset    Diabetes Mother     Asthma Mother     Hypertension Mother     Diabetes Father     Diabetes Brother     Hypertension Brother     Anemia Daughter     Glaucoma Neg Hx     Macular degeneration Neg Hx     Retinal detachment Neg Hx        SOCIAL HISTORY:     Social History     Tobacco Use    Smoking status: Never Smoker    Smokeless tobacco: Never Used   Substance Use Topics    Alcohol use: No        Social History     Substance and Sexual Activity   Sexual Activity Not Currently        HOME MEDICATIONS:     Prior to Admission medications    Medication Sig Start Date End Date Taking? Authorizing Provider   amlodipine-valsartan (EXFORGE)  mg per tablet Take 1 tablet by mouth once daily. 10/3/21   Cristina Ren MD   aspirin 81 MG Chew Take 1 tablet (81 mg total) by mouth once daily. 6/2/21 6/2/22  Cassidy Shaw, BRY, NP   atorvastatin  "(LIPITOR) 40 MG tablet Take 1 tablet (40 mg total) by mouth once daily. 10/6/21 10/6/22  Cristina Ren MD   blood glucose control, low Soln Use as directed 10/3/21   Cristina Ren MD   blood-glucose meter (TRUE METRIX GLUCOSE METER) kit Use as instructed 10/20/21 10/19/22  Gabriel Moran, NP   chlorthalidone (HYGROTEN) 50 MG Tab Take 1 tablet (50 mg total) by mouth once daily. 10/6/21 10/6/22  Cristina Ren MD   clobetasol 0.05% (TEMOVATE) 0.05 % Oint  2/24/20   Historical Provider   cloNIDine (CATAPRES) 0.1 MG tablet Take 1 tablet (0.1 mg total) by mouth every 8 (eight) hours as needed (For BP greater than 170/95 mm Hg). 3/31/21 6/2/21  Jeff Anguiano MD   ergocalciferol (ERGOCALCIFEROL) 50,000 unit Cap TK 1 C PO Q 7 DAYS  Patient taking differently: Take 50,000 Units by mouth every 7 days. TK 1 C PO Q 7 DAYS 2/11/21   Cristina Ren MD   fluocinonide 0.1 % Crea  2/24/20   Historical Provider   hydrALAZINE (APRESOLINE) 100 MG tablet Take 1 tablet (100 mg total) by mouth 3 (three) times daily. 10/3/21 10/3/22  Cristina Ren MD   HYDROcodone-acetaminophen (NORCO)  mg per tablet Take 1 tablet by mouth 2 (two) times daily as needed. 11/5/21   Historical Provider   insulin aspart U-100 (NOVOLOG FLEXPEN U-100 INSULIN) 100 unit/mL (3 mL) InPn pen Inject 12 Units into the skin 3 (three) times daily before meals. 12 u AC + correction MaxTDD 60u 10/6/21   Cristina Ren MD   insulin degludec (TRESIBA FLEXTOUCH U-100) 100 unit/mL (3 mL) insulin pen Inject 36 Units into the skin once daily. 10/6/21 10/6/22  Cristina Ren MD   lancets Misc To check BG 2 times daily, to use with insurance preferred meter 1/2/19   Cassidy Shaw DNP, NP   metoprolol succinate (TOPROL-XL) 100 MG 24 hr tablet Take 1 tablet (100 mg total) by mouth once daily. 10/3/21 1/1/22  Cristina Ren MD   pen needle, diabetic (BD MI 2ND GEN PEN NEEDLE) 32 gauge x 5/32" Ndle Uses 4 daily 6/2/21   Cassidy Shaw DNP, NP   pen " "needle, diabetic (BD MI 2ND GEN PEN NEEDLE) 32 gauge x 5/32" Ndle Inject daily 10/6/21   Cristina Ren MD   umeclidinium (INCRUSE ELLIPTA) 62.5 mcg/actuation inhalation capsule Inhale 1 capsule (63 mcg total) into the lungs once daily. Controller 2/11/21   Cristina Ren MD         PHYSICAL EXAM:   BP (!) 191/91   Pulse 73   Temp 98.8 °F (37.1 °C) (Oral)   Resp 20   Ht 4' 11" (1.499 m)   Wt 59 kg (130 lb)   SpO2 97%   BMI 26.26 kg/m²   Vitals Reviewed  General appearance: Well-developed, well-nourished, chronically ill-appearing  female   Skin:  Left middle digit with significant swelling and bruising.  Dressing clean dry and intact. Multiple small scabs to BLE  Neuro: AAOx3. Follows commands. Motor and sensory exams grossly intact. Good tone. Right sided weakness from previous stroke  HENT: Atraumatic head. Moist mucous membranes of oral cavity.  Eyes: Normal extraocular movements.  Right periorbital bruising.  No bony tenderness on exam.  Neck: Supple. No evidence of lymphadenopathy. No thyroidomegaly.  Lungs: Clear to auscultation bilaterally. No wheezing present.   Heart: Regular rate and rhythm. S1 and S2 present with no murmurs/gallop/rub. No pedal edema. No JVD present.   Abdomen: Soft, non-distended, non-tender. No rebound tenderness/guarding. No masses or organomegaly. Bowel sounds are normal. Bladder is not palpable.   Extremities: No cyanosis, clubbing.  Limited range of motion to left middle finger due to swelling and pain.  Peripheral pulses intact  Psych/mental status: Alert and oriented. Cooperative. Responds appropriately to questions.               EMERGENCY DEPARTMENT LABS AND IMAGING:     Labs Reviewed   CBC W/ AUTO DIFFERENTIAL - Abnormal; Notable for the following components:       Result Value    Hematocrit 35.4 (*)     MCV 78 (*)     Platelets 471 (*)     All other components within normal limits   COMPREHENSIVE METABOLIC PANEL - Abnormal; Notable for the following " components:    Sodium 132 (*)     Glucose 472 (*)     eGFR if non  53.6 (*)     All other components within normal limits    Narrative:     Glucose critical result(s) repeated. Called and verbal readback   obtained from Cindy Rene Rn/ER by ASSofia 11/23/2021 18:54   C-REACTIVE PROTEIN - Abnormal; Notable for the following components:    CRP 3.21 (*)     All other components within normal limits   SEDIMENTATION RATE - Abnormal; Notable for the following components:    Sed Rate 39 (*)     All other components within normal limits   POCT GLUCOSE - Abnormal; Notable for the following components:    POC Glucose 464 (*)     All other components within normal limits   CULTURE, AEROBIC  (SPECIFY SOURCE)   CULTURE, BLOOD   CULTURE, BLOOD   PROTIME-INR   C-REACTIVE PROTEIN   SEDIMENTATION RATE   SARS-COV-2 RNA AMPLIFICATION, QUAL       CT Maxillofacial Without Contrast   Final Result      CT Head Without Contrast   Final Result      X-Ray Hand 3 view Left   Final Result          ASSESSMENT & PLAN:   Active problems/reason for admission  Cellulitis of left middle finger with osteomyelitis  Hypertensive urgency  Hyperglycemia    Chronic problems  Hypertension  Diabetes  CVA  COPD  Arthritis        Plan  Admit to med surg  IV Zosyn/vanc  Blood cultures pending  Wound culture pending  Consult I&D  MRI of left middle finger  Wound care consult  Blood pressure improved with IV hydralazine  Restart home blood pressure meds  Give regular insulin 6 units IV now  Normal saline 500 cc bolus  Accu-Cheks with  Substitute long-acting insulin with detemir and decrease by 50% to 18 units nightly  Continue other home meds as ordered for chronic conditions  Will hold home aspirin and any further anticoagulants for now until patient has MRI and it is determined she does not need any further procedures.        Diet: Cardiac/Diabetic    DVT Prophylaxis:  Mechanical DVT prophylaxis. Encourage ambulation and OOB as tolerated.      Discharge Planning and Disposition:  Patient will be discharged in 2-3 days  ________________________________________________________________________________    This patient is high risk for life-threatening deterioration and death secondary to above comorbidities and need for IV treatment. This patient meets inpatient criteria.   Face-to-Face encounter date: 11/23/2021  Encounter included review of the medical records, interviewing and examining the patient face-to-face, discussion with other health care providers including emergency medicine physician, admission orders, interpreting lab/test results and formulating a plan of care.   Medical Decision Making during this encounter was  [_] Low Complexity  [_] Moderate Complexity  [x] High Complexity  _________________________________________________________________________________    INPATIENT LIST OF MEDICATIONS     Current Facility-Administered Medications:     mupirocin 2 % ointment, , Topical (Top), Daily, Gato Sepulveda MD, Given at 11/23/21 1935    piperacillin-tazobactam 4.5 g in dextrose 5 % 100 mL IVPB (ready to mix system), 4.5 g, Intravenous, ED 1 Time, Louann Broussard DO, Last Rate: 200 mL/hr at 11/23/21 2125, 4.5 g at 11/23/21 2125    Pharmacy to dose Vancomycin consult, , , Once **AND** vancomycin - pharmacy to dose, , Intravenous, pharmacy to manage frequency, Louann Broussard DO    vancomycin in dextrose 5 % 1 gram/250 mL IVPB 1,000 mg, 1,000 mg, Intravenous, Once, Louann Broussard DO    Current Outpatient Medications:     amlodipine-valsartan (EXFORGE)  mg per tablet, Take 1 tablet by mouth once daily., Disp: 90 tablet, Rfl: 3    aspirin 81 MG Chew, Take 1 tablet (81 mg total) by mouth once daily., Disp: 90 tablet, Rfl: 3    atorvastatin (LIPITOR) 40 MG tablet, Take 1 tablet (40 mg total) by mouth once daily., Disp: 90 tablet, Rfl: 3    blood glucose control, low Soln, Use as directed, Disp: 1 each, Rfl: 1    blood-glucose meter  "(TRUE METRIX GLUCOSE METER) kit, Use as instructed, Disp: 1 each, Rfl: 0    chlorthalidone (HYGROTEN) 50 MG Tab, Take 1 tablet (50 mg total) by mouth once daily., Disp: 90 tablet, Rfl: 3    clobetasol 0.05% (TEMOVATE) 0.05 % Oint, , Disp: , Rfl:     cloNIDine (CATAPRES) 0.1 MG tablet, Take 1 tablet (0.1 mg total) by mouth every 8 (eight) hours as needed (For BP greater than 170/95 mm Hg)., Disp: 90 tablet, Rfl: 0    ergocalciferol (ERGOCALCIFEROL) 50,000 unit Cap, TK 1 C PO Q 7 DAYS (Patient taking differently: Take 50,000 Units by mouth every 7 days. TK 1 C PO Q 7 DAYS), Disp: 12 capsule, Rfl: 4    fluocinonide 0.1 % Crea, , Disp: , Rfl:     hydrALAZINE (APRESOLINE) 100 MG tablet, Take 1 tablet (100 mg total) by mouth 3 (three) times daily., Disp: 270 tablet, Rfl: 3    HYDROcodone-acetaminophen (NORCO)  mg per tablet, Take 1 tablet by mouth 2 (two) times daily as needed., Disp: , Rfl:     insulin aspart U-100 (NOVOLOG FLEXPEN U-100 INSULIN) 100 unit/mL (3 mL) InPn pen, Inject 12 Units into the skin 3 (three) times daily before meals. 12 u AC + correction MaxTDD 60u, Disp: 11 Syringe, Rfl: 3    insulin degludec (TRESIBA FLEXTOUCH U-100) 100 unit/mL (3 mL) insulin pen, Inject 36 Units into the skin once daily., Disp: 11 pen, Rfl: 3    lancets Misc, To check BG 2 times daily, to use with insurance preferred meter, Disp: 200 each, Rfl: 3    metoprolol succinate (TOPROL-XL) 100 MG 24 hr tablet, Take 1 tablet (100 mg total) by mouth once daily., Disp: 90 tablet, Rfl: 3    pen needle, diabetic (BD MI 2ND GEN PEN NEEDLE) 32 gauge x 5/32" Ndle, Uses 4 daily, Disp: 400 each, Rfl: 4    pen needle, diabetic (BD MI 2ND GEN PEN NEEDLE) 32 gauge x 5/32" Ndle, Inject daily, Disp: 90 each, Rfl: 3    umeclidinium (INCRUSE ELLIPTA) 62.5 mcg/actuation inhalation capsule, Inhale 1 capsule (63 mcg total) into the lungs once daily. Controller, Disp: 90 each, Rfl: 3      Scheduled Meds:   mupirocin   Topical (Top) " Daily    piperacillin-tazobactam (ZOSYN) IVPB  4.5 g Intravenous ED 1 Time    vancomycin (VANCOCIN) IVPB  1,000 mg Intravenous Once     Continuous Infusions:  PRN Meds:.      Keeley Lutz  [facility] Hospitalist  11/23/2021

## 2021-11-24 NOTE — ED NOTES
Pt to exam 13 c/o trip and fall x 3 days ago, also c/o swelling to left middle finger. Pt has swelling and bruise around right eye. Pt denies LOC, denies taking blood thinners. Aaox3, resp even and unlabored.

## 2021-11-24 NOTE — PLAN OF CARE
Problem: Adult Inpatient Plan of Care  Goal: Optimal Comfort and Wellbeing  Outcome: Ongoing, Progressing     Problem: Adult Inpatient Plan of Care  Goal: Readiness for Transition of Care  Outcome: Ongoing, Progressing     Problem: Adult Inpatient Plan of Care  Goal: Rounds/Family Conference  Outcome: Ongoing, Progressing

## 2021-11-24 NOTE — CONSULTS
"Consult Note  Infectious Disease    Reason for Consult:   Osteomyelitis of the left middle finger    HPI: Steff Johnson is a 61 y.o. female  Presented to the emergency room on 11/23 with left middle finger swelling with pain of 2-3 days duration after a fall four days ago, injuring her finger and hitting her head ( CT imaging of brain and facial structures only showed frontal soft tissue swelling).  In the emergency room her blood pressure was 233/132, her blood sugar was 464 and she was found to have pus in the distal phalangeal soft tissue and surrounding the nail circumferentially.  An incision and drainage was performed but the emergency room provider.  An x-ray showed a "tiny focal area of cortical discontinuity along the distal tuft of the 3rd finger possibly representing a tiny chip fracture or osseous erosion from infection/osteomyelitis "  She was given vancomycin and Zosyn and admitted to Hospital Medicine and an MRI of the finger was requested.  So far the culture of the abscess of the finger is negative and blood cultures are negative as well. The MRI shows left long finger distal phalanx osteomyelitis with additional long finger flexor tendon mild tenosynovitis.     Hermost recent hemoglobin A1c on June 2nd was 10.7.  Blood sugars since admission ranged from 234- 481.  She endorses that her finger was completely normal until her fall 4-5 days ago.   states that her blood pressure is high due to pain in right shoulder and right arm. She had a rotator cuff repair (2018) but has very limited ROM. She also seems to have right sided cervical root pain.  reports that she has had what sounds like a cervical steroid injection(MRI June 2017 indicates C5/6 cervical disc disease and spinal stenosis, injection June 2017 also, in [facility] records) . Her blood sugar is not measured at home. She and her  frequent fast food very often (and there is Dustin Boyle in the room now). Current A1c is " 14.8%    Review of patient's allergies indicates:  No Known Allergies  Past Medical History:   Diagnosis Date    Arthritis     Complete tear of left rotator cuff 11/9/2017    COPD (chronic obstructive pulmonary disease)     COVID-19 infection 7/2/2021    Diabetes mellitus     H/o Cerebrovascular accident (CVA) of left pontine structure 12/12/2019    Hypertension     Stroke     Wears glasses     COVID-19 07/02/2021 ( EUA infusion was ordered but patient could not be contacted)    Cervical epidural steroid injection June 2017  Past Surgical History:   Procedure Laterality Date    COLONOSCOPY N/A 4/11/2017    Procedure: COLONOSCOPY;  Surgeon: Jared Antonio MD;  Location: [facility];  Service: Endoscopy;  Laterality: N/A;    HYSTERECTOMY      OOPHORECTOMY      SHOULDER SURGERY      R     Social History     Socioeconomic History    Marital status: Single   Occupational History     Comment: disabled due to shoulder problems   Tobacco Use    Smoking status: Never Smoker    Smokeless tobacco: Never Used   Substance and Sexual Activity    Alcohol use: No    Drug use: No    Sexual activity: Not Currently     Family History   Problem Relation Age of Onset    Diabetes Mother     Asthma Mother     Hypertension Mother     Diabetes Father     Diabetes Brother     Hypertension Brother     Anemia Daughter     Glaucoma Neg Hx     Macular degeneration Neg Hx     Retinal detachment Neg Hx          Review of Systems:   No chills, fever, sweats   No swelling of joints, redness of joints  or new focal pain prior to fall. Chronic right shoulder and cervical nerve root pain.  Diabetes with complications, uncontrolled     No recent/prior steroids       EXAM & DIAGNOSTICS REVIEWED:   Vitals:     Temp:  [98.2 °F (36.8 °C)-98.8 °F (37.1 °C)]   Temp: 98.2 °F (36.8 °C) (11/24/21 0500)  Pulse: 83 (11/24/21 0500)  Resp: 17 (11/24/21 0500)  BP: (!) 151/83 (11/24/21 0500)  SpO2: 98 % (11/24/21 0500)    Intake/Output  Summary (Last 24 hours) at 11/24/2021 0920  Last data filed at 11/23/2021 2201  Gross per 24 hour   Intake 120 ml   Output --   Net 120 ml       General:  In NAD. Alert and attentive, cooperative, comfortable  Eyes:  Anicteric,   EOMI, facial weakness  ENT:  No ulcers, exudates, thrush, nares patent,    Neck:  supple, but does have soreness right trapezius  Lungs: Clear, no consolidation, rales, wheezes, rub  Heart:  RRR, no gallop/murmur/rub noted  Abd:  Soft, NT, ND, normal BS, no masses or organomegaly appreciated.  :  Voids   Musc:  Joints without effusion, swelling, erythema, synovitis,  Limited ROM of right shoulder  Skin:  No rashes. No palmar or plantar lesions.    Neuro:             Alert, attentive, speech fluent, face symmetric, moves all extremities,   Right arm weakness.   Psych:  Calm, cooperative  Lymphatic:     No cervical, supraclavicular, axillary,  nodes  Extrem: No edema, erythema, phlebitis, cellulitis, warm and well perfused  VAD:       Isolation:    Wound: 11/23 11/24:    There is minimal cellulitis of the nail bed. There is no purulence remaining. ROM of the distal phalanx is back to normal. There are no penetrating lesions of the tuft. There is no clinical evidence for suppurative tenosynovitis.            General Labs reviewed:  Recent Labs   Lab 11/23/21 1820 11/24/21  0559   WBC 8.64 10.65   HGB 12.3 11.6*   HCT 35.4* 33.2*   * 479*       Recent Labs   Lab 11/23/21 1820 11/24/21  0559   * 134*   K 4.4 3.2*   CL 95 99   CO2 27 24   BUN 14 12   CREATININE 1.1 0.9   CALCIUM 9.2 8.9   PROT 8.3  --    BILITOT 0.8  --    ALKPHOS 82  --    ALT 11  --    AST 14  --      Recent Labs   Lab 11/23/21 1820   CRP 3.21*         Micro:  Microbiology Results (last 7 days)     Procedure Component Value Units Date/Time    Aerobic culture [[id number]] Collected: 11/23/21 2025    Order Status: Completed Specimen: Wound from Finger, Left Hand Updated: 11/24/21 0734     Aerobic Bacterial  Culture No growth    Narrative:      Left 3rd digit    Blood Culture #2 **CANNOT BE ORDERED STAT** [[id number]] Collected: 11/23/21 2118    Order Status: Completed Specimen: Blood from Peripheral, Antecubital, Right Updated: 11/24/21 0358     Blood Culture, Routine No Growth to date    Blood Culture #1 **CANNOT BE ORDERED STAT** [[id number]] Collected: 11/23/21 2103    Order Status: Completed Specimen: Blood from Peripheral, Antecubital, Right Updated: 11/24/21 0358     Blood Culture, Routine No Growth to date          Imaging Reviewed:   MRI finger  1. Left long finger distal phalanx osteomyelitis with additional long finger flexor tendon mild tenosynovitis.  2. Diffuse edema and enhancement involving left long finger soft tissues could be reactive in nature or reflect cellulitis    Cervical MRI 6/2017  Mild interval worsening degenerative disc disease predominantly at C5-C6 causing mild spinal canal stenosis and bilateral neural foraminal stenosis as outlined above.      Cardiology:    IMPRESSION & PLAN   1. Suppurative paronychia, drained in ED,  abnormal MRI without clinical findings to support osteomyelitis.  Culture negative so far   (discussed with radiology who concurs with possibility that trauma could cause the findings on MRI but cannot exclude osteomyelitis)   2. Uncontrolled diabetes, non compliant with monitoring and diet  3. Multiple complications of diabetes: retinopathy, nephropathy, CVA  4. Hypertension, uncontrolled.       Recommendations:  Continue vanc, de-escalate zosyn to rocephin  Will f/u on cultures in am and choose oral antibiotic  I do not anticipate treating for osteomyelitis, but will follow up in office and repeat plain film for evolution of findings compatible with osteomyelitis (12/3 appt)  Orthopedics consult ,   Consider outpatient evaluation for continued cervical nerve root impingement     Tight glucose control   Have requested dietician to come to educate again  Counseled on risk  of CVA with uncontrolled hypertension and diabetes, frequent BS monitoring, staying away from fast food, etc.   Needs f/u with primary MD as well  (if she insists on leaving today, would rx doxycycline for 14 days.)      Medical Decision Making during this encounter was  [_] Low Complexity  [_] Moderate Complexity  [  ] High Complexity

## 2021-11-24 NOTE — ED PROVIDER NOTES
Encounter Date: 11/23/2021       History     Chief Complaint   Patient presents with    Fall     TRIP AND FALL OVER CAT, 2 DAYS AGO, NO LOC    Hand Injury     LEFT 3RD FINGER SWOLLEN    Knee Injury     BOTH     61-year-old female history of diabetes, hypertension, COPD, prior stroke, presenting with left middle finger swelling with pain times 2-3 days after fall approximately 4 days ago.  Patient has a she tripped on the porch over their cat and hit her face on the door, she says she also fell or jammed her finger.  She denies LOC, vision changes, or other facial pain.  Was not evaluated immediately following the fall.  She has since noticed increased swelling and pain to her left 3rd finger, especially at the very tip of the finger.  Denies any known cuts, scrapes or wounds there.  Is able to bend her finger however range of motion limited due to swelling.        Review of patient's allergies indicates:  No Known Allergies  Past Medical History:   Diagnosis Date    Arthritis     COPD (chronic obstructive pulmonary disease)     Diabetes mellitus     Hypertension     Stroke     Wears glasses      Past Surgical History:   Procedure Laterality Date    COLONOSCOPY N/A 4/11/2017    Procedure: COLONOSCOPY;  Surgeon: Jared Antonio MD;  Location: [facility];  Service: Endoscopy;  Laterality: N/A;    HYSTERECTOMY      OOPHORECTOMY      SHOULDER SURGERY      R     Family History   Problem Relation Age of Onset    Diabetes Mother     Asthma Mother     Hypertension Mother     Diabetes Father     Diabetes Brother     Hypertension Brother     Anemia Daughter     Glaucoma Neg Hx     Macular degeneration Neg Hx     Retinal detachment Neg Hx      Social History     Tobacco Use    Smoking status: Never Smoker    Smokeless tobacco: Never Used   Substance Use Topics    Alcohol use: No    Drug use: No     Review of Systems   Constitutional: Negative for fever.   HENT: Negative for sore throat.     Respiratory: Negative for shortness of breath.    Cardiovascular: Negative for chest pain.   Gastrointestinal: Negative for nausea.   Genitourinary: Negative for dysuria.   Musculoskeletal: Positive for arthralgias (And finger swelling). Negative for back pain.   Skin: Positive for color change. Negative for rash and wound.   Neurological: Negative for weakness.   Hematological: Does not bruise/bleed easily.       Physical Exam     Initial Vitals [11/23/21 1746]   BP Pulse Resp Temp SpO2   (!) 233/132 80 20 98.8 °F (37.1 °C) 98 %      MAP       --         Physical Exam    Nursing note and vitals reviewed.  Constitutional: She appears well-developed and well-nourished. She is not diaphoretic. No distress.   HENT:   Head: Normocephalic.   Right Ear: External ear normal.   Left Ear: External ear normal.   Some right periorbital bruising, no bony tenderness on exam   Eyes: Conjunctivae and EOM are normal. Pupils are equal, round, and reactive to light.   Neck: Neck supple.   Cardiovascular: Normal rate, regular rhythm, normal heart sounds and intact distal pulses.   Pulmonary/Chest: Breath sounds normal. No respiratory distress. She has no wheezes. She has no rhonchi. She has no rales.   Abdominal: Abdomen is soft. She exhibits no distension. There is no abdominal tenderness. There is no rebound and no guarding.   Musculoskeletal:         General: Tenderness and edema present.      Cervical back: Neck supple.      Comments: Left 3rd digit with significant swelling to the distal phalange, appears to have pus in the pulp and surrounding the nail on both dorsal and palmar surfaces.  No flexor tendon tenderness, finger not held in flexion, able to extend fully, full flexion of the DIP limited by swelling.  Some purplish discoloration to the palmar surface of the 3rd digit overlying the PIP  Neurovascularly intact throughout the digit and hand      Neurological: She is alert and oriented to person, place, and time. GCS  score is 15. GCS eye subscore is 4. GCS verbal subscore is 5. GCS motor subscore is 6.   Skin: Skin is warm. Capillary refill takes less than 2 seconds.   Psychiatric: She has a normal mood and affect.         ED Course   I & D - Incision and Drainage    Date/Time: 11/23/2021 8:57 PM  Location procedure was performed: [facility] EMERGENCY DEPARTMENT  Performed by: Louann Broussard DO  Authorized by: Louann Broussard DO   Assisting provider: Janette Navarro  Pre-operative diagnosis: paronychia/felon1  Post-operative diagnosis: same  Consent Done: Yes  Consent: Verbal consent obtained.  Risks and benefits: risks, benefits and alternatives were discussed  Consent given by: patient  Patient understanding: patient states understanding of the procedure being performed  Imaging studies: imaging studies available  Patient identity confirmed: verbally with patient  Type: abscess  Body area: upper extremity  Location details: left hand  Anesthesia: digital block    Anesthesia:  Local Anesthetic: lidocaine 1% without epinephrine  Anesthetic total: 4 mL    Patient sedated: no  Description of findings: Used blunt dissection with blunt needle  to lift away cuticle   Complexity: simple  Drainage: pus  Drainage amount: copious  Wound treatment: deloculation and  expression of material  Packing material: none  Complications: No  Patient tolerance: Patient tolerated the procedure well with no immediate complications        Labs Reviewed   CBC W/ AUTO DIFFERENTIAL - Abnormal; Notable for the following components:       Result Value    Hematocrit 35.4 (*)     MCV 78 (*)     Platelets 471 (*)     All other components within normal limits   COMPREHENSIVE METABOLIC PANEL - Abnormal; Notable for the following components:    Sodium 132 (*)     Glucose 472 (*)     eGFR if non  53.6 (*)     All other components within normal limits    Narrative:     Glucose critical result(s) repeated. Called and verbal readback   obtained from  Cindy Rene Rn/ER by AS2 11/23/2021 18:54   C-REACTIVE PROTEIN - Abnormal; Notable for the following components:    CRP 3.21 (*)     All other components within normal limits   SEDIMENTATION RATE - Abnormal; Notable for the following components:    Sed Rate 39 (*)     All other components within normal limits   POCT GLUCOSE - Abnormal; Notable for the following components:    POC Glucose 464 (*)     All other components within normal limits   CULTURE, AEROBIC  (SPECIFY SOURCE)   CULTURE, BLOOD   CULTURE, BLOOD   PROTIME-INR   C-REACTIVE PROTEIN   SEDIMENTATION RATE   SARS-COV-2 RNA AMPLIFICATION, QUAL          Imaging Results          CT Maxillofacial Without Contrast (Final result)  Result time 11/23/21 19:26:54    Final result by Serg Brambila MD (11/23/21 19:26:54)                 Narrative:    CMS MANDATED QUALITY DATA - CT RADIATION 436    All CT scans at this facility utilize dose modulation, iterative reconstruction, and/or weight based dosing when appropriate to reduce radiation dose to as low as reasonably achievable.    CLINICAL HISTORY:  61 years (1960) Female fall w facial trauma    TECHNIQUE:  CT MAXILLOFACIAL WITHOUT IV CONTRAST. 365 images obtained. Contiguous thin section axial images were obtained of the maxillofacial region were obtained. Sagittal and coronal reformatted images were generated.    COMPARISON:  CT most recently from July 2, 2021.    FINDINGS:  Soft tissue swelling over the right frontal bone/superior orbital rim with no acute skull fracture identified. There is an age-indeterminate left-sided nasal bone fracture (image 90), similar in appearance to the previous CT. The globes are within normal limits. There is no preseptal, post septal or intraconal abnormality. No findings to suggest retrobulbar hematoma. The mandible appears intact and the mandibular condyles are well seated within the temporal mandibular fossae. The visualized paranasal sinuses show scattered areas of  mucosal thickening with no air-fluid level. The mastoid air cells are clear. There is periapical lucency along the left lateral incisor. The visualized skull base is unremarkable.    IMPRESSION:  Soft tissue swelling over the right frontal bone with no acute osseous abnormality identified as above.      Electronically signed by:  Serg Brambila MD  11/23/2021 7:26 PM CST Workstation: [id number]                             CT Head Without Contrast (Final result)  Result time 11/23/21 19:24:00    Final result by Serg Brambila MD (11/23/21 19:24:00)                 Narrative:    CMS MANDATED QUALITY DATA - CT RADIATION 436    All CT scans at this facility utilize dose modulation, iterative reconstruction, and/or weight based dosing when appropriate to reduce radiation dose to as low as reasonably achievable.    CLINICAL HISTORY:  61 years (1960) Female fall w facial trauma    TECHNIQUE:  CT HEAD WITHOUT IV CONTRAST. 102 images obtained. Axial CT of the brain without contrast using soft tissue and bone algorithm. .    COMPARISON:  None available.    FINDINGS:  Focal soft tissue swelling over the right frontal bone with no acute intracranial hemorrhage, hydrocephalus, herniation or midline shift and the basal and suprasellar cisterns are patent. No acute skull fracture is identified.    Moderate periventricular deep cerebral white matter low attenuation, a nonspecific finding in this age group which can be seen in any diffuse white matter process but which is most commonly associated with chronic microvascular ischemic disease. Tiny rounded defects are seen in the basal ganglia compatible with prior lacunar infarcts (image 23). There are scattered atheromatous calcifications in the intracranial internal carotid arteries.    Orbital contents appear within normal limits. External auditory canals are unremarkable. The visualized paranasal sinuses and mastoid air cells are essentially clear.    IMPRESSION:  1. No  acute intracranial process.  2. Chronic/involutional findings as noted above.                  .    Electronically signed by:  Serg Brambila MD  11/23/2021 7:24 PM CST Workstation: [id number]                             X-Ray Hand 3 view Left (Final result)  Result time 11/23/21 18:57:01    Final result by Serg Brambila MD (11/23/21 18:57:01)                 Narrative:    CLINICAL HISTORY:  61 years (1960) Female L finger injury with discoloration and pain, concern for fracture w infection L finger injury with discoloration and pain to distal end of 3rd digit, concern for fracture w infection    TECHNIQUE:  XR HAND 3 OR MORE VIEWS. 3 image(s) obtained.    COMPARISON:  None available.    FINDINGS:  No acute fracture or dislocation. The joints and interspaces are maintained. Focal soft tissue swelling over the tip of the third finger (middle finger) with no radiopaque foreign body identified. On the oblique radiograph, there is a tiny focal area of cortical discontinuity along the palmar aspect of the distal tuft (radial-side). No displaced fracture is identified, and the joints and interspaces appear to be maintained, noting moderate osteoarthritic change in the distal interphalangeal joints of the third and fourth fingers.    IMPRESSION:  1. Marked focal soft tissue swelling over the tip of the middle finger.  2. Tiny focal area of cortical discontinuity along the distal tuft of the third finger, possibly representing a tiny chip fracture or osseous erosion from infection/osteomyelitis.                  .    Electronically signed by:  Serg Brambila MD  11/23/2021 6:57 PM CST Workstation: [id number]                               Medications   mupirocin 2 % ointment ( Topical (Top) Given 11/23/21 1935)   vancomycin - pharmacy to dose (has no administration in time range)   vancomycin in dextrose 5 % 1 gram/250 mL IVPB 1,000 mg (1,000 mg Intravenous New Bag 11/23/21 2204)   hydrALAZINE injection 10 mg  (10 mg Intravenous Given 11/23/21 1920)   LIDOcaine (PF) 10 mg/ml (1%) injection 100 mg (100 mg Intradermal Given 11/23/21 1935)   hydrALAZINE tablet 100 mg (100 mg Oral Given 11/23/21 2048)   piperacillin-tazobactam 4.5 g in dextrose 5 % 100 mL IVPB (ready to mix system) (0 g Intravenous Stopped 11/23/21 2201)     Medical Decision Making:   History:   I obtained history from: someone other than patient.  Old Medical Records: I decided to obtain old medical records.  Old Records Summarized: records from clinic visits and records from previous admission(s).  Clinical Tests:   Lab Tests: Ordered and Reviewed  Radiological Study: Ordered and Reviewed  Medical Tests: Ordered and Reviewed  ED Management:  61-year-old female with diabetes and hypertension presenting with fall 4 days ago, subsequent pain and swelling to her left 3rd digit over the past 2-3 days.  Exam with significant swelling to the distal phalange of her left 3rd digit, also with some pus-discoloration of pulp and surrounding nailbed. Concern for felon, paronychia, underlying finger fracture, osteomyelitis, less likely flexor tenosynovitis as unable signs not present.  Obtaining x-ray of the finger, and labs given she is diabetic and plan care glucose very high.  She is also very hypertensive, may be related to discomfort vs poor control of underlying disease vs medicines due.  Given she is diabetic, she may need IV antibiotics to abscess with cellulitis is improving.  Plan to incise and drain feel, she is diabetic with tendency to her poor wound healing well dissect away with blunt instrument instead of making cuts to relieve abscess and pressure.  Anticipate admission for this patient.  Louann Broussard DO HOYENNY  [facility]               ED Course as of 11/23/21 2207 Tue Nov 23, 2021 1915 X-Ray Hand 3 view Left  Sig soft tissue swelling distal aspect 3rd digit, also w bony erosion v chip fracture (?osteo) [VS]   1948  Sed Rate(!): 39 [VS]   1948 CRP(!): 3.21 [VS]   2200 CT Maxillofacial Without Contrast  Soft tissue swelling over R frontal bone w no acute osseous abn [VS]   2201 CT Head Without Contrast  No acute process  [VS]   2206 I&D successful with copious purulence drainage, sent for culture.  Blood cultures also obtained, have now started broad-spectrum antibiotics given concern for resistant infections in this diabetic patient with saline and cellulitis, may also have underlying osteo.  Consulted and admitted to internal medicine for further workup and treatment. [VS]      ED Course User Index  [VS] Louann Broussard DO             Clinical Impression:   Final diagnoses:  [E11.65] Poorly controlled diabetes mellitus (Primary)  [I16.0] Hypertensive urgency  [L03.012] Cellulitis of left middle finger  [L03.012] Felon of finger of left hand          ED Disposition Condition    Admit               Louann Broussard DO  Resident  11/23/21 2207

## 2021-11-24 NOTE — CONSULTS
"[facility]  Adult Nutrition   Consult Note (Nutrition Education)     SUMMARY     Recommendations  Recommendation/Intervention: 1. RD provided therapeutic diet education.  Goals: 1. Patient to express understanding of diet recommendations and lifestyle changes. RD to answer diet related questions should they arise.  Nutrition Goal Status: new,goal met    Dietitian Rounds Brief  Consult for diabetic diet education. Request to educate  too, however  not in the room. RD notified patient of RD contact info and advised patient to have  call for review of diet instructions and if he has any questions. RD educated patient on meal planning with the plate method, HA1c values and goals, carbohydrate food sources and food list. Patient expressed understanding of diet recommendations.     Reason for Assessment  Reason For Assessment: consult  Diagnosis: diabetes diagnosis/complications    Nutrition Risk Screen  Nutrition Risk Screen: no indicators present       Wound 11/24/21 0117 Abrasion(s) Right anterior;lower Leg-Wound Image: Images linked       Wound Left posterior Finger, third-Wound Image: Images linked  MST Score: 0  Have you recently lost weight without trying?: No  Weight loss score: 0  Have you been eating poorly because of a decreased appetite?: No  Appetite score: 0       Anthropometrics  Temp: 97.9 °F (36.6 °C)  Height Method: Stated  Height: 4' 11" (149.9 cm)  Height (inches): 59 in  Weight Method: Standard Scale  Weight: 59 kg (130 lb)  Weight (lb): 130 lb  Ideal Body Weight (IBW), Female: 95 lb  % Ideal Body Weight, Female (lb): 136.84 %  BMI (Calculated): 26.2       Weight History:  Wt Readings from Last 10 Encounters:   11/23/21 59 kg (130 lb)   07/02/21 56.7 kg (125 lb)   06/30/21 59 kg (130 lb)   06/02/21 57.6 kg (126 lb 15.8 oz)   03/31/21 58.7 kg (129 lb 8.3 oz)   03/25/21 58.8 kg (129 lb 10.1 oz)   02/11/21 59.6 kg (131 lb 6.3 oz)   12/03/20 60.1 kg (132 lb 6.2 oz) "   09/02/20 61 kg (134 lb 5.9 oz)   06/11/20 61.3 kg (135 lb 0.5 oz)       Lab/Procedures/Meds: Pertinent Labs Reviewed    Clinical Chemistry:  Recent Labs   Lab 11/23/21 1820 11/23/21 1820 11/24/21  0559   *   < > 134*   K 4.4   < > 3.2*   CL 95   < > 99   CO2 27   < > 24   *   < > 170*   BUN 14   < > 12   CREATININE 1.1   < > 0.9   CALCIUM 9.2   < > 8.9   PROT 8.3  --   --    ALBUMIN 3.7  --   --    BILITOT 0.8  --   --    ALKPHOS 82  --   --    AST 14  --   --    ALT 11  --   --    ANIONGAP 10   < > 11   ESTGFRAFRICA >60.0   < > >60.0   EGFRNONAA 53.6*   < > >60.0   MG  --   --  1.6    < > = values in this interval not displayed.       CBC:   Recent Labs   Lab 11/24/21  0559   WBC 10.65   RBC 4.26   HGB 11.6*   HCT 33.2*   *   MCV 78*   MCH 27.2   MCHC 34.9       Inflammatory Labs:  Recent Labs   Lab 11/23/21 1820   CRP 3.21*       Diabetes:  Recent Labs   Lab 11/24/21  0559   HGBA1C 14.8*       Medications: Pertinent Medications reviewed    Scheduled Meds:   atorvastatin  40 mg Oral Daily    cefTRIAXone (ROCEPHIN) IVPB  1 g Intravenous Q24H    chlorthalidone  50 mg Oral Daily    cloNIDine 0.1 mg/24 hr td ptwk  1 patch Transdermal Q7 Days    hydrALAZINE  100 mg Oral TID    insulin detemir U-100  18 Units Subcutaneous BID    insulin regular        magnesium sulfate IVPB  1 g Intravenous Once    metoprolol succinate  100 mg Oral Daily    mupirocin   Topical (Top) Daily    potassium chloride  40 mEq Oral Q4H    tiotropium bromide  2 puff Inhalation Daily    vancomycin (VANCOCIN) IVPB  1,000 mg Intravenous Q24H       PRN Meds:.acetaminophen, calcium chloride IVPB, calcium chloride IVPB, calcium chloride IVPB, dextrose 50%, dextrose 50%, glucagon (human recombinant), glucose, glucose, HYDROcodone-acetaminophen, insulin aspart U-100, magnesium oxide, magnesium sulfate IVPB, magnesium sulfate IVPB, magnesium sulfate IVPB, magnesium sulfate IVPB, naloxone, ondansetron, potassium  chloride in water, potassium chloride in water, potassium chloride in water, potassium chloride in water, potassium chloride, potassium chloride, potassium chloride, potassium chloride, senna-docusate 8.6-50 mg, sodium chloride 0.9%, Pharmacy to dose Vancomycin consult **AND** vancomycin - pharmacy to dose    Nutrition Risk  Level of Risk/Frequency of Follow-up: moderate     Monitor and Evaluation  Knowledge/Beliefs/Attitudes: beliefs and attitudes,food and nutrition knowledge/skill     Nutrition Follow-Up  RD Follow-up?: No    Renetta Parekh RD 11/24/2021 11:59 AM

## 2021-11-24 NOTE — DISCHARGE SUMMARY
[facility]  Discharge Summary      Patient Name: Steff Johnson  MRN: [id number]  Patient Class: IP- Inpatient  Admission Date: 11/23/2021  Hospital Length of Stay: 1 days  Discharge Date and Time:  11/24/2021 2:53 PM  Attending Physician: Messi Rice MD   Discharging Provider: Messi Rice MD  Primary Care Provider: Cristina Ren MD    HPI:   History was obtained from the patient and ER physician Sign-out.  Patient is a 61-year-old female with history of diabetes, hypertension, CKD, COPD, previous stroke who presents to the ED with a complaint of pain and edema to left middle finger that has been progressive for the past 2-3 days.  The patient reports she tripped and fell over there CAD about 4 days hitting her face on the door and she also jammed her left finger.  She states her finger became painful with swelling over the past 2 days.  She denies any drainage.  She denies loss of consciousness or visual changes.  The patient reports she has frequent falls with multiple scabs on bilateral lower extremities.  Patient denies change in vision, hearing, headache, fever, cough, congestion, runny nose, chest pain, shortness of breath, palpitations, abdominal pain, diarrhea, constipation, vomiting, dysuria, hematuria, numbness tingling or LOC..   In the ED patient is afebrile.  She is hypertensive 233/132 she was treated with hydralazine IV with improvement in her blood pressure.  CBC with normal white count.  Hematocrit 35.4 and MCV 78. Platelets 471  CMP with glucose 472, BUN 14, creatinine 1.1.  Inflammatory markers elevated  CT of head with no acute intracranial process.  CT of maxillofacial shows soft tissue swelling.  X-ray of left hand shows marked focal soft tissue swelling over the tip of the middle finger.  Tiny focal area along the distal tuft of 3rd finger possibly representing chip fracture or osseous erosion from infection/osteomyelitis.  The patient is treated  "with IV antibiotics.  I&D of the left middle finger was done in the ED and culture sent.    Hospital Course:   The patient was admitted to the hospital for workup and treatment including empiric broad-spectrum IV antibiotics.  Consultation was obtained with Infectious Disease and Orthopedic surgery. MRI had some concern for osteomyelitis. Orthopedic surgery evaluated the patient and did not find clinical evidence of any osteomyelitis, but suspected possible mild distal tuft fracture.  Clinically the patient improved rapidly with antibiotics and strongly requested discharge home because of the Thanksgiving holiday.  Due to patient strong preference, the patient was transition to oral antibiotics and allowed discharge home this evening.  The patient will complete 14 day course of doxycycline.  Due to hypertension during hospital stay, the patient was started on clonidine patch and blood pressure improved. The patient will follow up with outpatient with Orthopedic surgery.  The patient is in understanding and agreement with the discharge plan today.    Consults:   Consults (From admission, onward)        Status Ordering Provider     Inpatient consult to Registered Dietitian/Nutritionist  Once        Provider:  (Not yet assigned)    Completed CONCEPCION SMITH     Inpatient consult to Orthopedic Surgery  Once        Provider:  Abhishek Del Angel MD    Acknowledged CONCEPCION SMITH     Pharmacy to dose Vancomycin consult  Once        Provider:  (Not yet assigned)   "And" Linked Group Details    Acknowledged CATRACHITO LUTZ     Inpatient consult to Infectious Diseases  Once        Provider:  Concepcion Smith MD    Completed CATRACHITO LUTZ     Inpatient consult to Hospitalist  Once        Provider:  Catrachito Lutz NP    Acknowledged COLT MOORE        Discharge diagnoses:  Cellulitis of left middle finger with Paronychia infection  Possible mild distal tuft fracture left middle " finger  Hypertensive urgency  Hyperglycemia due to poorly controlled IDDM2  Hypertension  CVA  COPD  Arthritis  Final Active Diagnoses:    Diagnosis Date Noted POA    PRINCIPAL PROBLEM:  Osteomyelitis of finger of left hand [M86.9] 11/23/2021 Yes    Paronychia of finger, left [L03.012]  Yes    Hypertensive urgency [I16.0] 03/31/2021 Yes    Hypertension [I10] 03/30/2021 Yes    Poorly controlled diabetes mellitus [E11.65] 06/05/2015 Yes      Problems Resolved During this Admission:     Discharge physical exam:  Comfortable appearing, nontoxic, no apparent distress, follows commands appropriately, in good spirits  Comfortable work breathing, lungs are clear bilaterally  2+ radial pulses  Left middle finger swollen/blister, improved some    Discharged Condition: stable    Disposition: Home or Self Care    Follow Up:   Follow-up Information     Cristina Ren MD In 2 weeks.    Specialty: Family Medicine  Contact information:  [address] [place] [address]  [phone]             Abhishek Del Angel MD In 1 week.    Specialties: Orthopedic Surgery, Surgery  Contact information:  [address] [place] [address] [address] [place] [address]  [phone]                       Patient Instructions:      Diet Cardiac     Diet diabetic     Notify your health care provider if you experience any of the following:  temperature >100.4     Notify your health care provider if you experience any of the following:  persistent nausea and vomiting or diarrhea     Notify your health care provider if you experience any of the following:  severe uncontrolled pain     Activity as tolerated       Pending Diagnostic Studies:     None         Medications:  Reconciled Home Medications:      Medication List      START taking these medications    cloNIDine 0.1 mg/24 hr td ptwk 0.1 mg/24 hr  Commonly known as: CATAPRES  Place 1 patch onto the skin every 7 days.  Start taking on: December 1, 2021     doxycycline 100 MG Cap  Commonly known as:  "VIBRAMYCIN  Take 1 capsule (100 mg total) by mouth 2 (two) times daily. for 14 days        CHANGE how you take these medications    ergocalciferol 50,000 unit Cap  Commonly known as: ERGOCALCIFEROL  TK 1 C PO Q 7 DAYS  What changed:   · how much to take  · how to take this  · when to take this  · additional instructions     TRESIBA FLEXTOUCH U-100 100 unit/mL (3 mL) insulin pen  Generic drug: insulin degludec  Inject 40 Units into the skin once daily.  What changed: how much to take        CONTINUE taking these medications    aspirin 81 MG Chew  Take 1 tablet (81 mg total) by mouth once daily.     atorvastatin 40 MG tablet  Commonly known as: LIPITOR  Take 1 tablet (40 mg total) by mouth once daily.     blood glucose control, low Soln  Use as directed     blood-glucose meter kit  Commonly known as: TRUE METRIX GLUCOSE METER  Use as instructed     chlorthalidone 50 MG Tab  Commonly known as: HYGROTEN  Take 1 tablet (50 mg total) by mouth once daily.     hydrALAZINE 100 MG tablet  Commonly known as: APRESOLINE  Take 1 tablet (100 mg total) by mouth 3 (three) times daily.     HYDROcodone-acetaminophen  mg per tablet  Commonly known as: NORCO  Take 1 tablet by mouth 2 (two) times daily as needed.     INCRUSE ELLIPTA 62.5 mcg/actuation inhalation capsule  Generic drug: umeclidinium  Inhale 1 capsule (63 mcg total) into the lungs once daily. Controller     insulin aspart U-100 100 unit/mL (3 mL) Inpn pen  Commonly known as: NovoLOG Flexpen U-100 Insulin  Inject 12 Units into the skin 3 (three) times daily before meals. 12 u AC + correction MaxTDD 60u     lancets Misc  To check BG 2 times daily, to use with insurance preferred meter     metoprolol succinate 100 MG 24 hr tablet  Commonly known as: TOPROL-XL  Take 1 tablet (100 mg total) by mouth once daily.     * pen needle, diabetic 32 gauge x 5/32" Ndle  Commonly known as: BD MI 2ND GEN PEN NEEDLE  Uses 4 daily     * pen needle, diabetic 32 gauge x 5/32" " Ndle  Commonly known as: BD MI 2ND GEN PEN NEEDLE  Inject daily         * This list has 2 medication(s) that are the same as other medications prescribed for you. Read the directions carefully, and ask your doctor or other care provider to review them with you.            STOP taking these medications    amlodipine-valsartan  mg per tablet  Commonly known as: EXFORGE     clobetasol 0.05% 0.05 % Oint  Commonly known as: TEMOVATE     cloNIDine 0.1 MG tablet  Commonly known as: CATAPRES     fluocinonide 0.1 % Crea            Indwelling Lines/Drains at time of discharge:   Lines/Drains/Airways     None                 Time spent on the discharge of patient: 36 minutes         Messi Rice MD  Department of Hospital Medicine  [facility]

## 2021-11-24 NOTE — PLAN OF CARE
[facility]  Initial Discharge Assessment       Primary Care Provider: Cristina Ren MD    Admission Diagnosis: Poorly controlled diabetes mellitus [E11.65]    Admission Date: 11/23/2021  Expected Discharge Date: 11/24/2021    Discharge Barriers Identified: None     Initial assessment completed at bedside with patient. Patient anticipates discharge home with family. No discharge needs identified.    Payor: [employer] MANAGED MEDICARE / Plan: [employer] MEDICARE HMO / Product Type: Capitation /     Extended Emergency Contact Information  Primary Emergency Contact: Gita Johnson  Address: [place] [address] [place]  Mobile Phone: [phone]  Relation: Sister  Preferred language: English   needed? No  Secondary Emergency Contact: Tee Johnson  Address: [place] [address] [place]  Mobile Phone: [phone]  Relation: Brother  Preferred language: English   needed? No    Discharge Plan A: Home  Discharge Plan B: Home      [id number] [address] & [address] [address]  Phone: [phone] Fax: [phone]    [facility] - [place] [id number] [facility]  [address] [facility] [address]  Phone: [phone] Fax: [phone]      Initial Assessment (most recent)     Adult Discharge Assessment - 11/24/21 1200        Discharge Assessment    Confirmed/corrected address, phone number and insurance Yes     Confirmed Demographics Correct on Facesheet     Source of Information patient     When was your last doctors appointment? 11/10/21     Reason For Admission Diabetes     Lives With significant other;sibling(s)     Facility Arrived From: Home     Do you expect to return to your current living situation? Yes     Do you have help at home or someone to help you manage your care at home? Yes     Who are your  caregiver(s) and their phone number(s)? Gita Johnson [phone]     Prior to hospitilization cognitive status: Alert/Oriented     Current cognitive status: Alert/Oriented     Walking or Climbing Stairs Difficulty ambulation difficulty, requires equipment     Mobility Management walker     Do you have any problems with: Needs other help     Specify other help Gita Johnson [phone]     Home Layout Able to live on 1st floor     Equipment Currently Used at Home rollator;walker, rolling     Readmission within 30 days? No     Patient currently being followed by outpatient case management? No     Do you currently have service(s) that help you manage your care at home? No     Do you take prescription medications? Yes     Do you have prescription coverage? Yes     Coverage [employer]     Do you have any problems affording any of your prescribed medications? No     Is the patient taking medications as prescribed? yes     Who is going to help you get home at discharge? Giat Johnson [phone]     How do you get to doctors appointments? family or friend will provide     Are you on dialysis? No     Do you take coumadin? No     Discharge Plan A Home     Discharge Plan B Home     DME Needed Upon Discharge  none     Discharge Plan discussed with: Patient     Discharge Barriers Identified None        Relationship/Environment    Name(s) of Who Lives With Patient Gita Johnson [phone]

## 2021-11-26 LAB — BACTERIA SPEC AEROBE CULT: ABNORMAL

## 2021-11-28 LAB
BACTERIA BLD CULT: NORMAL
BACTERIA BLD CULT: NORMAL

## 2021-11-29 ENCOUNTER — PATIENT OUTREACH (OUTPATIENT)
Dept: ADMINISTRATIVE | Facility: OTHER | Age: 61
End: 2021-11-29
Payer: MEDICARE

## 2021-12-03 ENCOUNTER — HOSPITAL ENCOUNTER (OUTPATIENT)
Dept: RADIOLOGY | Facility: HOSPITAL | Age: 61
Discharge: HOME | End: 2021-12-03
Attending: INTERNAL MEDICINE
Payer: MEDICARE

## 2021-12-03 ENCOUNTER — OFFICE VISIT (OUTPATIENT)
Dept: INFECTIOUS DISEASES | Facility: CLINIC | Age: 61
End: 2021-12-03
Payer: MEDICARE

## 2021-12-03 VITALS
HEART RATE: 67 BPM | HEIGHT: 59 IN | SYSTOLIC BLOOD PRESSURE: 162 MMHG | DIASTOLIC BLOOD PRESSURE: 98 MMHG | BODY MASS INDEX: 22.1 KG/M2 | OXYGEN SATURATION: 98 % | WEIGHT: 109.63 LBS | TEMPERATURE: 98 F

## 2021-12-03 DIAGNOSIS — R93.6 ABNORMAL X-RAY OF HAND: ICD-10-CM

## 2021-12-03 DIAGNOSIS — L03.019 PARONYCHIA OF FINGER, UNSPECIFIED LATERALITY: Primary | ICD-10-CM

## 2021-12-03 DIAGNOSIS — A49.02 MRSA (METHICILLIN RESISTANT STAPHYLOCOCCUS AUREUS): ICD-10-CM

## 2021-12-03 DIAGNOSIS — E11.65 POORLY CONTROLLED DIABETES MELLITUS: ICD-10-CM

## 2021-12-03 DIAGNOSIS — L03.019 PARONYCHIA OF FINGER, UNSPECIFIED LATERALITY: ICD-10-CM

## 2021-12-03 PROCEDURE — 4010F ACE/ARB THERAPY RXD/TAKEN: CPT | Mod: S$GLB,,, | Performed by: INTERNAL MEDICINE

## 2021-12-03 PROCEDURE — 99070 SPECIAL SUPPLIES PHYS/QHP: CPT | Mod: S$GLB,,, | Performed by: INTERNAL MEDICINE

## 2021-12-03 PROCEDURE — 99213 OFFICE O/P EST LOW 20 MIN: CPT | Mod: S$GLB,,, | Performed by: INTERNAL MEDICINE

## 2021-12-03 PROCEDURE — 99070 PR SPECIAL SUPPLIES: ICD-10-PCS | Mod: S$GLB,,, | Performed by: INTERNAL MEDICINE

## 2021-12-03 PROCEDURE — 4010F PR ACE/ARB THEARPY RXD/TAKEN: ICD-10-PCS | Mod: S$GLB,,, | Performed by: INTERNAL MEDICINE

## 2021-12-03 PROCEDURE — 99213 PR OFFICE/OUTPT VISIT, EST, LEVL III, 20-29 MIN: ICD-10-PCS | Mod: S$GLB,,, | Performed by: INTERNAL MEDICINE

## 2021-12-03 PROCEDURE — 73140 X-RAY EXAM OF FINGER(S): CPT | Mod: TC,LT

## 2021-12-07 ENCOUNTER — OFFICE VISIT (OUTPATIENT)
Dept: OPHTHALMOLOGY | Facility: CLINIC | Age: 61
End: 2021-12-07
Payer: MEDICARE

## 2021-12-07 DIAGNOSIS — Z79.4 TYPE 2 DIABETES MELLITUS WITH BOTH EYES AFFECTED BY MODERATE NONPROLIFERATIVE RETINOPATHY AND MACULAR EDEMA, WITH LONG-TERM CURRENT USE OF INSULIN: ICD-10-CM

## 2021-12-07 DIAGNOSIS — H25.813 COMBINED FORMS OF AGE-RELATED CATARACT, BILATERAL: ICD-10-CM

## 2021-12-07 DIAGNOSIS — H40.053 OCULAR HYPERTENSION, BILATERAL: Primary | ICD-10-CM

## 2021-12-07 DIAGNOSIS — E11.3313 TYPE 2 DIABETES MELLITUS WITH BOTH EYES AFFECTED BY MODERATE NONPROLIFERATIVE RETINOPATHY AND MACULAR EDEMA, WITH LONG-TERM CURRENT USE OF INSULIN: ICD-10-CM

## 2021-12-07 PROCEDURE — 99499 RISK ADDL DX/OHS AUDIT: ICD-10-PCS | Mod: S$GLB,,, | Performed by: OPHTHALMOLOGY

## 2021-12-07 PROCEDURE — 4010F PR ACE/ARB THEARPY RXD/TAKEN: ICD-10-PCS | Mod: HCNC,CPTII,S$GLB, | Performed by: OPHTHALMOLOGY

## 2021-12-07 PROCEDURE — 99214 OFFICE O/P EST MOD 30 MIN: CPT | Mod: HCNC,S$GLB,, | Performed by: OPHTHALMOLOGY

## 2021-12-07 PROCEDURE — 92133 POSTERIOR SEGMENT OCT OPTIC NERVE(OCULAR COHERENCE TOMOGRAPHY) - OU - BOTH EYES: ICD-10-PCS | Mod: HCNC,S$GLB,, | Performed by: OPHTHALMOLOGY

## 2021-12-07 PROCEDURE — 99499 UNLISTED E&M SERVICE: CPT | Mod: S$GLB,,, | Performed by: OPHTHALMOLOGY

## 2021-12-07 PROCEDURE — 92133 CPTRZD OPH DX IMG PST SGM ON: CPT | Mod: HCNC,S$GLB,, | Performed by: OPHTHALMOLOGY

## 2021-12-07 PROCEDURE — 99214 PR OFFICE/OUTPT VISIT, EST, LEVL IV, 30-39 MIN: ICD-10-PCS | Mod: HCNC,S$GLB,, | Performed by: OPHTHALMOLOGY

## 2021-12-07 PROCEDURE — 99999 PR PBB SHADOW E&M-EST. PATIENT-LVL III: CPT | Mod: PBBFAC,HCNC,, | Performed by: OPHTHALMOLOGY

## 2021-12-07 PROCEDURE — 4010F ACE/ARB THERAPY RXD/TAKEN: CPT | Mod: HCNC,CPTII,S$GLB, | Performed by: OPHTHALMOLOGY

## 2021-12-07 PROCEDURE — 99999 PR PBB SHADOW E&M-EST. PATIENT-LVL III: ICD-10-PCS | Mod: PBBFAC,HCNC,, | Performed by: OPHTHALMOLOGY

## 2021-12-08 ENCOUNTER — TELEPHONE (OUTPATIENT)
Dept: INFECTIOUS DISEASES | Facility: CLINIC | Age: 61
End: 2021-12-08
Payer: MEDICARE

## 2021-12-30 ENCOUNTER — PES CALL (OUTPATIENT)
Dept: ADMINISTRATIVE | Facility: CLINIC | Age: 61
End: 2021-12-30
Payer: MEDICARE

## 2022-01-05 ENCOUNTER — HOSPITAL ENCOUNTER (INPATIENT)
Facility: HOSPITAL | Age: 62
LOS: 2 days | Discharge: HOME HEALTH | DRG: 064 | End: 2022-01-07
Attending: EMERGENCY MEDICINE | Admitting: INTERNAL MEDICINE
Payer: MEDICARE

## 2022-01-05 DIAGNOSIS — I63.9 ACUTE CVA (CEREBROVASCULAR ACCIDENT): ICD-10-CM

## 2022-01-05 DIAGNOSIS — R29.6 FREQUENT FALLS: ICD-10-CM

## 2022-01-05 DIAGNOSIS — R53.1 RIGHT SIDED WEAKNESS: ICD-10-CM

## 2022-01-05 DIAGNOSIS — R73.9 HYPERGLYCEMIA: ICD-10-CM

## 2022-01-05 DIAGNOSIS — N17.9 ACUTE KIDNEY INJURY: ICD-10-CM

## 2022-01-05 DIAGNOSIS — I62.9 INTRACRANIAL HEMORRHAGE: Primary | ICD-10-CM

## 2022-01-05 DIAGNOSIS — I63.9 STROKE: ICD-10-CM

## 2022-01-05 LAB
ALBUMIN SERPL BCP-MCNC: 4 G/DL (ref 3.5–5.2)
ALP SERPL-CCNC: 73 U/L (ref 55–135)
ALT SERPL W/O P-5'-P-CCNC: 25 U/L (ref 10–44)
ANION GAP SERPL CALC-SCNC: 16 MMOL/L (ref 8–16)
APTT PPP: 27.6 SEC (ref 23.3–35.1)
AST SERPL-CCNC: 28 U/L (ref 10–40)
BASOPHILS # BLD AUTO: 0.07 K/UL (ref 0–0.2)
BASOPHILS NFR BLD: 0.6 % (ref 0–1.9)
BILIRUB SERPL-MCNC: 1.4 MG/DL (ref 0.1–1)
BNP SERPL-MCNC: 22 PG/ML (ref 0–99)
BUN SERPL-MCNC: 33 MG/DL (ref 8–23)
CALCIUM SERPL-MCNC: 9.6 MG/DL (ref 8.7–10.5)
CHLORIDE SERPL-SCNC: 98 MMOL/L (ref 95–110)
CHOLEST SERPL-MCNC: 206 MG/DL (ref 120–199)
CHOLEST/HDLC SERPL: 2.2 {RATIO} (ref 2–5)
CO2 SERPL-SCNC: 21 MMOL/L (ref 23–29)
CREAT SERPL-MCNC: 1.8 MG/DL (ref 0.5–1.4)
CREAT SERPL-MCNC: 1.9 MG/DL (ref 0.5–1.4)
DIFFERENTIAL METHOD: ABNORMAL
EOSINOPHIL # BLD AUTO: 0 K/UL (ref 0–0.5)
EOSINOPHIL NFR BLD: 0.1 % (ref 0–8)
ERYTHROCYTE [DISTWIDTH] IN BLOOD BY AUTOMATED COUNT: 15.1 % (ref 11.5–14.5)
EST. GFR  (AFRICAN AMERICAN): 31.9 ML/MIN/1.73 M^2
EST. GFR  (NON AFRICAN AMERICAN): 27.7 ML/MIN/1.73 M^2
GLUCOSE SERPL-MCNC: 155 MG/DL (ref 70–110)
GLUCOSE SERPL-MCNC: 302 MG/DL (ref 70–110)
GLUCOSE SERPL-MCNC: 379 MG/DL (ref 70–110)
GLUCOSE SERPL-MCNC: 426 MG/DL (ref 70–110)
GLUCOSE SERPL-MCNC: 427 MG/DL (ref 70–110)
HCT VFR BLD AUTO: 35.9 % (ref 37–48.5)
HDLC SERPL-MCNC: 93 MG/DL (ref 40–75)
HDLC SERPL: 45.1 % (ref 20–50)
HGB BLD-MCNC: 12.7 G/DL (ref 12–16)
IMM GRANULOCYTES # BLD AUTO: 0.06 K/UL (ref 0–0.04)
IMM GRANULOCYTES NFR BLD AUTO: 0.5 % (ref 0–0.5)
INR PPP: 1.2
LDLC SERPL CALC-MCNC: 100.2 MG/DL (ref 63–159)
LYMPHOCYTES # BLD AUTO: 2.8 K/UL (ref 1–4.8)
LYMPHOCYTES NFR BLD: 22.6 % (ref 18–48)
MCH RBC QN AUTO: 27.3 PG (ref 27–31)
MCHC RBC AUTO-ENTMCNC: 35.4 G/DL (ref 32–36)
MCV RBC AUTO: 77 FL (ref 82–98)
MONOCYTES # BLD AUTO: 1 K/UL (ref 0.3–1)
MONOCYTES NFR BLD: 8 % (ref 4–15)
NEUTROPHILS # BLD AUTO: 8.4 K/UL (ref 1.8–7.7)
NEUTROPHILS NFR BLD: 68.2 % (ref 38–73)
NONHDLC SERPL-MCNC: 113 MG/DL
NRBC BLD-RTO: 0 /100 WBC
PLATELET # BLD AUTO: 463 K/UL (ref 150–450)
PMV BLD AUTO: 10.2 FL (ref 9.2–12.9)
POTASSIUM SERPL-SCNC: 4 MMOL/L (ref 3.5–5.1)
PROT SERPL-MCNC: 8.2 G/DL (ref 6–8.4)
PROTHROMBIN TIME: 13.9 SEC (ref 11.4–13.7)
RBC # BLD AUTO: 4.66 M/UL (ref 4–5.4)
SAMPLE: ABNORMAL
SARS-COV-2 RDRP RESP QL NAA+PROBE: NEGATIVE
SODIUM SERPL-SCNC: 135 MMOL/L (ref 136–145)
TRIGL SERPL-MCNC: 64 MG/DL (ref 30–150)
TROPONIN I SERPL DL<=0.01 NG/ML-MCNC: 0.03 NG/ML
TSH SERPL DL<=0.005 MIU/L-ACNC: 0.81 UIU/ML (ref 0.34–5.6)
WBC # BLD AUTO: 12.33 K/UL (ref 3.9–12.7)

## 2022-01-05 PROCEDURE — 83036 HEMOGLOBIN GLYCOSYLATED A1C: CPT | Performed by: EMERGENCY MEDICINE

## 2022-01-05 PROCEDURE — 93010 EKG 12-LEAD: ICD-10-PCS | Mod: ,,, | Performed by: INTERNAL MEDICINE

## 2022-01-05 PROCEDURE — 96361 HYDRATE IV INFUSION ADD-ON: CPT

## 2022-01-05 PROCEDURE — 85610 PROTHROMBIN TIME: CPT | Performed by: EMERGENCY MEDICINE

## 2022-01-05 PROCEDURE — 63600175 PHARM REV CODE 636 W HCPCS: Performed by: EMERGENCY MEDICINE

## 2022-01-05 PROCEDURE — G0425 INPT/ED TELECONSULT30: HCPCS | Mod: 95,,, | Performed by: PSYCHIATRY & NEUROLOGY

## 2022-01-05 PROCEDURE — 83880 ASSAY OF NATRIURETIC PEPTIDE: CPT | Performed by: EMERGENCY MEDICINE

## 2022-01-05 PROCEDURE — 93005 ELECTROCARDIOGRAM TRACING: CPT | Performed by: INTERNAL MEDICINE

## 2022-01-05 PROCEDURE — 96374 THER/PROPH/DIAG INJ IV PUSH: CPT

## 2022-01-05 PROCEDURE — 99291 CRITICAL CARE FIRST HOUR: CPT

## 2022-01-05 PROCEDURE — 85730 THROMBOPLASTIN TIME PARTIAL: CPT | Performed by: EMERGENCY MEDICINE

## 2022-01-05 PROCEDURE — 93010 ELECTROCARDIOGRAM REPORT: CPT | Mod: ,,, | Performed by: INTERNAL MEDICINE

## 2022-01-05 PROCEDURE — 36415 COLL VENOUS BLD VENIPUNCTURE: CPT | Performed by: EMERGENCY MEDICINE

## 2022-01-05 PROCEDURE — 25000003 PHARM REV CODE 250: Performed by: INTERNAL MEDICINE

## 2022-01-05 PROCEDURE — G0425 PR INPT TELEHEALTH CONSULT 30M: ICD-10-PCS | Mod: 95,,, | Performed by: PSYCHIATRY & NEUROLOGY

## 2022-01-05 PROCEDURE — 80053 COMPREHEN METABOLIC PANEL: CPT | Performed by: EMERGENCY MEDICINE

## 2022-01-05 PROCEDURE — 84484 ASSAY OF TROPONIN QUANT: CPT | Performed by: EMERGENCY MEDICINE

## 2022-01-05 PROCEDURE — 63600175 PHARM REV CODE 636 W HCPCS: Performed by: INTERNAL MEDICINE

## 2022-01-05 PROCEDURE — U0002 COVID-19 LAB TEST NON-CDC: HCPCS | Performed by: EMERGENCY MEDICINE

## 2022-01-05 PROCEDURE — 85025 COMPLETE CBC W/AUTO DIFF WBC: CPT | Performed by: EMERGENCY MEDICINE

## 2022-01-05 PROCEDURE — 21400001 HC TELEMETRY ROOM

## 2022-01-05 PROCEDURE — 82962 GLUCOSE BLOOD TEST: CPT

## 2022-01-05 PROCEDURE — 25500020 PHARM REV CODE 255: Performed by: EMERGENCY MEDICINE

## 2022-01-05 PROCEDURE — 84443 ASSAY THYROID STIM HORMONE: CPT | Performed by: EMERGENCY MEDICINE

## 2022-01-05 PROCEDURE — 80061 LIPID PANEL: CPT | Performed by: EMERGENCY MEDICINE

## 2022-01-05 RX ORDER — POTASSIUM CHLORIDE 20 MEQ/1
40 TABLET, EXTENDED RELEASE ORAL
Status: DISCONTINUED | OUTPATIENT
Start: 2022-01-05 | End: 2022-01-07 | Stop reason: HOSPADM

## 2022-01-05 RX ORDER — MAGNESIUM SULFATE HEPTAHYDRATE 40 MG/ML
2 INJECTION, SOLUTION INTRAVENOUS
Status: DISCONTINUED | OUTPATIENT
Start: 2022-01-05 | End: 2022-01-07 | Stop reason: HOSPADM

## 2022-01-05 RX ORDER — HYDROMORPHONE HYDROCHLORIDE 1 MG/ML
0.5 INJECTION, SOLUTION INTRAMUSCULAR; INTRAVENOUS; SUBCUTANEOUS EVERY 6 HOURS PRN
Status: DISCONTINUED | OUTPATIENT
Start: 2022-01-05 | End: 2022-01-07 | Stop reason: HOSPADM

## 2022-01-05 RX ORDER — LORAZEPAM 2 MG/ML
0.5 INJECTION INTRAMUSCULAR
Status: COMPLETED | OUTPATIENT
Start: 2022-01-05 | End: 2022-01-05

## 2022-01-05 RX ORDER — CLONIDINE 0.1 MG/24H
1 PATCH, EXTENDED RELEASE TRANSDERMAL
Status: DISCONTINUED | OUTPATIENT
Start: 2022-01-05 | End: 2022-01-07 | Stop reason: HOSPADM

## 2022-01-05 RX ORDER — HYDROCODONE BITARTRATE AND ACETAMINOPHEN 10; 325 MG/1; MG/1
1 TABLET ORAL EVERY 6 HOURS PRN
Status: DISCONTINUED | OUTPATIENT
Start: 2022-01-05 | End: 2022-01-05

## 2022-01-05 RX ORDER — POTASSIUM CHLORIDE 7.45 MG/ML
40 INJECTION INTRAVENOUS
Status: DISCONTINUED | OUTPATIENT
Start: 2022-01-05 | End: 2022-01-07 | Stop reason: HOSPADM

## 2022-01-05 RX ORDER — MAGNESIUM SULFATE 1 G/100ML
1 INJECTION INTRAVENOUS
Status: DISCONTINUED | OUTPATIENT
Start: 2022-01-05 | End: 2022-01-07 | Stop reason: HOSPADM

## 2022-01-05 RX ORDER — ATORVASTATIN CALCIUM 20 MG/1
40 TABLET, FILM COATED ORAL DAILY
Status: DISCONTINUED | OUTPATIENT
Start: 2022-01-05 | End: 2022-01-05

## 2022-01-05 RX ORDER — POTASSIUM CHLORIDE 20 MEQ/1
20 TABLET, EXTENDED RELEASE ORAL
Status: DISCONTINUED | OUTPATIENT
Start: 2022-01-05 | End: 2022-01-07 | Stop reason: HOSPADM

## 2022-01-05 RX ORDER — ASPIRIN 81 MG/1
81 TABLET ORAL DAILY
Status: DISCONTINUED | OUTPATIENT
Start: 2022-01-05 | End: 2022-01-05

## 2022-01-05 RX ORDER — POTASSIUM CHLORIDE 7.45 MG/ML
20 INJECTION INTRAVENOUS
Status: DISCONTINUED | OUTPATIENT
Start: 2022-01-05 | End: 2022-01-07 | Stop reason: HOSPADM

## 2022-01-05 RX ORDER — GLUCAGON 1 MG
1 KIT INJECTION
Status: DISCONTINUED | OUTPATIENT
Start: 2022-01-05 | End: 2022-01-07 | Stop reason: HOSPADM

## 2022-01-05 RX ORDER — LABETALOL HYDROCHLORIDE 5 MG/ML
10 INJECTION, SOLUTION INTRAVENOUS EVERY 6 HOURS PRN
Status: DISCONTINUED | OUTPATIENT
Start: 2022-01-05 | End: 2022-01-07 | Stop reason: HOSPADM

## 2022-01-05 RX ORDER — INSULIN ASPART 100 [IU]/ML
1-10 INJECTION, SOLUTION INTRAVENOUS; SUBCUTANEOUS EVERY 6 HOURS PRN
Status: DISCONTINUED | OUTPATIENT
Start: 2022-01-05 | End: 2022-01-07 | Stop reason: HOSPADM

## 2022-01-05 RX ORDER — MAGNESIUM SULFATE HEPTAHYDRATE 40 MG/ML
4 INJECTION, SOLUTION INTRAVENOUS
Status: DISCONTINUED | OUTPATIENT
Start: 2022-01-05 | End: 2022-01-07 | Stop reason: HOSPADM

## 2022-01-05 RX ORDER — SODIUM CHLORIDE 0.9 % (FLUSH) 0.9 %
10 SYRINGE (ML) INJECTION
Status: DISCONTINUED | OUTPATIENT
Start: 2022-01-05 | End: 2022-01-07 | Stop reason: HOSPADM

## 2022-01-05 RX ORDER — ONDANSETRON 2 MG/ML
4 INJECTION INTRAMUSCULAR; INTRAVENOUS EVERY 6 HOURS PRN
Status: DISCONTINUED | OUTPATIENT
Start: 2022-01-05 | End: 2022-01-07 | Stop reason: HOSPADM

## 2022-01-05 RX ORDER — ATORVASTATIN CALCIUM 40 MG/1
40 TABLET, FILM COATED ORAL DAILY
Status: DISCONTINUED | OUTPATIENT
Start: 2022-01-05 | End: 2022-01-05

## 2022-01-05 RX ORDER — LANOLIN ALCOHOL/MO/W.PET/CERES
800 CREAM (GRAM) TOPICAL
Status: DISCONTINUED | OUTPATIENT
Start: 2022-01-05 | End: 2022-01-07 | Stop reason: HOSPADM

## 2022-01-05 RX ORDER — ATORVASTATIN CALCIUM 40 MG/1
80 TABLET, FILM COATED ORAL DAILY
Status: DISCONTINUED | OUTPATIENT
Start: 2022-01-06 | End: 2022-01-07 | Stop reason: HOSPADM

## 2022-01-05 RX ADMIN — HYDROMORPHONE HYDROCHLORIDE 0.5 MG: 1 INJECTION, SOLUTION INTRAMUSCULAR; INTRAVENOUS; SUBCUTANEOUS at 06:01

## 2022-01-05 RX ADMIN — CLONIDINE 1 PATCH: 0.1 PATCH TRANSDERMAL at 03:01

## 2022-01-05 RX ADMIN — IOHEXOL 100 ML: 350 INJECTION, SOLUTION INTRAVENOUS at 12:01

## 2022-01-05 RX ADMIN — LORAZEPAM 0.5 MG: 2 INJECTION INTRAMUSCULAR; INTRAVENOUS at 12:01

## 2022-01-05 RX ADMIN — SODIUM CHLORIDE, SODIUM LACTATE, POTASSIUM CHLORIDE, AND CALCIUM CHLORIDE 1000 ML: .6; .31; .03; .02 INJECTION, SOLUTION INTRAVENOUS at 12:01

## 2022-01-05 NOTE — SUBJECTIVE & OBJECTIVE
"  Woke up with symptoms?: yes    Recent bleeding noted: no  Does the patient take any Blood Thinners? no  Medications: Unknown      Past Medical History: hypertension, diabetes, stroke and COVID-19 History    Past Surgical History: no major surgeries within the last 2 weeks    Family History: no relevant history    Social History: unable to obtain    Allergies: No Known Allergies     Review of Systems   Unable to perform ROS: Mental status change     Objective:   Vitals: Blood pressure (!) 129/57, pulse 80, temperature 98.5 °F (36.9 °C), temperature source Oral, resp. rate 18, height 4' 11" (1.499 m), weight 49.9 kg (110 lb), SpO2 99 %.     CT READ: Yes  No hemmorhage. No mass effect. No early infarct signs.    MRI brain: motion degraded but no ostensible stroke seen.  MRA brain: motion degraded, no LVO    Physical Exam  Vitals and nursing note reviewed.   Constitutional:       General: She is not in acute distress.     Appearance: Normal appearance.   HENT:      Head: Normocephalic and atraumatic.      Nose: Nose normal.   Eyes:      Comments: L gaze   Cardiovascular:      Rate and Rhythm: Normal rate and regular rhythm.   Pulmonary:      Effort: No respiratory distress.   Abdominal:      General: There is no distension.   Genitourinary:     Comments: na  Musculoskeletal:      Cervical back: Normal range of motion.      Right lower leg: No edema.      Left lower leg: No edema.   Skin:     Findings: No erythema or rash.   Neurological:      Mental Status: She is alert. She is disoriented.      Cranial Nerves: Cranial nerve deficit present.      Motor: Weakness present.      Comments: Mildly aphasic, disoriented to year, L gaze preference, R sided weakness arm greater than leg, R neglect.   Psychiatric:      Comments: Unable to assess           "

## 2022-01-05 NOTE — ED NOTES
Spoke with transfer center - updated to time of patient going to MRI in attempt to coordinate with neurosurgeon

## 2022-01-05 NOTE — ED NOTES
Pt with weakness to RUE since this morning. Speech is slightly slurred. . Moving RLE, LLE, and LUE. Pt unable to follow all commands. Pt can lift BLEs when instructed, can not move RUE, Pt unable to follow command when asked to move only her eyes to test gaze. Upon arrival to ED, pt was with left side gaze. Telepsych physician interviewing patient

## 2022-01-05 NOTE — ED PROVIDER NOTES
Encounter Date: 1/5/2022       History     Chief Complaint   Patient presents with    Extremity Weakness     L sided weakness since 1000 yesterday      61-year-old female presented emergency department with right-sided weakness and slurred speech and looking to the left side and has decreased responsiveness for over 24 hours.  Patient's family notice that she was having difficulty speaking yesterday at 10:00 a.m..  And patient did not improve over the next 24 hours so family contacted EMS and patient was transported here.  Patient has history of diabetes and hypertension.  Patient has slurring of speech and has neglect and has difficulty answering questions.        Review of patient's allergies indicates:  No Known Allergies  Past Medical History:   Diagnosis Date    Arthritis     Complete tear of left rotator cuff 11/9/2017    COPD (chronic obstructive pulmonary disease)     COVID-19 infection 7/2/2021    Diabetes mellitus     H/o Cerebrovascular accident (CVA) of left pontine structure 12/12/2019    Hypertension     Stroke     Wears glasses      Past Surgical History:   Procedure Laterality Date    COLONOSCOPY N/A 4/11/2017    Procedure: COLONOSCOPY;  Surgeon: Jared Antonio MD;  Location: [facility];  Service: Endoscopy;  Laterality: N/A;    HYSTERECTOMY      OOPHORECTOMY      SHOULDER SURGERY      R     Family History   Problem Relation Age of Onset    Diabetes Mother     Asthma Mother     Hypertension Mother     Diabetes Father     Diabetes Brother     Hypertension Brother     Anemia Daughter     Glaucoma Neg Hx     Macular degeneration Neg Hx     Retinal detachment Neg Hx      Social History     Tobacco Use    Smoking status: Never Smoker    Smokeless tobacco: Never Used   Substance Use Topics    Alcohol use: No    Drug use: No     Review of Systems   Unable to perform ROS: Mental status change   Cardiovascular: Negative for chest pain.   Neurological: Positive for speech  difficulty and weakness.       Physical Exam     Initial Vitals [01/05/22 1030]   BP Pulse Resp Temp SpO2   (!) 129/57 80 18 98.5 °F (36.9 °C) 99 %      MAP       --         Physical Exam    Nursing note and vitals reviewed.  Constitutional: She appears well-developed and well-nourished.   HENT:   Head: Normocephalic and atraumatic.   Nose: Nose normal.   Mouth/Throat: Oropharynx is clear and moist.   Eyes: Conjunctivae and EOM are normal. Pupils are equal, round, and reactive to light.   Neck: Neck supple. No thyromegaly present. No tracheal deviation present. No JVD present.   Normal range of motion.  Cardiovascular: Normal rate, regular rhythm, normal heart sounds and intact distal pulses.   No murmur heard.  Pulmonary/Chest: Breath sounds normal. No stridor. No respiratory distress. She has no wheezes. She has no rales.   Abdominal: Abdomen is soft. Bowel sounds are normal. She exhibits no distension. There is no abdominal tenderness.   Musculoskeletal:         General: No tenderness or edema. Normal range of motion.      Cervical back: Normal range of motion and neck supple.     Neurological: She is alert. No sensory deficit.   Facial asymmetry noted and left-sided gaze noted which is fixed.  Patient also has hemiplegia with right-sided weakness noted.  Patient has neglect and slurred speech and can not tell her name and otherwise could not give was clear answers secondary to slurred speech and has expressive aphasia   Skin: Skin is warm. Capillary refill takes less than 2 seconds.   Psychiatric: She has a normal mood and affect. Thought content normal.         ED Course   Critical Care    Date/Time: 1/5/2022 10:31 AM  Performed by: Jatinder Parra MD  Authorized by: Jatinder Parra MD   Direct patient critical care time: 20 minutes  Ordering / reviewing critical care time: 5 minutes  Documentation critical care time: 5 minutes  Consulting other physicians critical care time: 5 minutes  Total critical care time  (exclusive of procedural time) : 35 minutes        Labs Reviewed   CBC W/ AUTO DIFFERENTIAL - Abnormal; Notable for the following components:       Result Value    Hematocrit 35.9 (*)     MCV 77 (*)     RDW 15.1 (*)     Platelets 463 (*)     Gran # (ANC) 8.4 (*)     Immature Grans (Abs) 0.06 (*)     All other components within normal limits   COMPREHENSIVE METABOLIC PANEL - Abnormal; Notable for the following components:    Sodium 135 (*)     CO2 21 (*)     Glucose 426 (*)     BUN 33 (*)     Creatinine 1.9 (*)     Total Bilirubin 1.4 (*)     eGFR if  31.9 (*)     eGFR if non  27.7 (*)     All other components within normal limits   LIPID PANEL - Abnormal; Notable for the following components:    Cholesterol 206 (*)     HDL 93 (*)     All other components within normal limits   PROTIME-INR - Abnormal; Notable for the following components:    PT 13.9 (*)     All other components within normal limits   ISTAT CREATININE - Abnormal; Notable for the following components:    POC Creatinine 1.8 (*)     All other components within normal limits   POCT GLUCOSE - Abnormal; Notable for the following components:    POC Glucose 427 (*)     All other components within normal limits   TSH   APTT   TROPONIN I   B-TYPE NATRIURETIC PEPTIDE   SARS-COV-2 RNA AMPLIFICATION, QUAL   POCT GLUCOSE MONITORING CONTINUOUS   POCT CREATININE     EKG Readings: (Independently Interpreted)   Initial Reading: No STEMI. Rhythm: Normal Sinus Rhythm. Ectopy: No Ectopy. Conduction: Normal.     ECG Results          ECG 12 lead (In process)  Result time 01/05/22 11:03:39    In process by Interface, [facility] (01/05/22 11:03:39)                 Narrative:    Test Reason : I63.9,    Vent. Rate : 079 BPM     Atrial Rate : 079 BPM     P-R Int : 146 ms          QRS Dur : 070 ms      QT Int : 460 ms       P-R-T Axes : 062 048 058 degrees     QTc Int : 527 ms    Normal sinus rhythm  Nonspecific ST and T wave  abnormality  Prolonged QT  Abnormal ECG  When compared with ECG of 02-JUL-2021 19:51,  Nonspecific T wave abnormality, improved in Inferior leads  T wave inversion no longer evident in Lateral leads  QT has lengthened    Referred By: [email or web address]   SELF           Confirmed By:                             Imaging Results          CTA Head and Neck (xpd) (Final result)  Result time 01/05/22 13:13:38    Final result by Donald Humphrey MD (01/05/22 13:13:38)                 Narrative:    CMS MANDATED QUALITY DATA - CT RADIATION - 436    All CT scans at this facility utilize dose modulation, iterative reconstruction, and/or weight based dosing when appropriate to reduce radiation dose to as low as reasonably achievable.    CMS MANDATED QUALITY DATA - CAROTID - 195    All measurements and percent stenosis described below were determined using NASCET criteria or criteria similar to NASCET, as defined by the Society of Radiologists in Ultrasound Consensus Conference, Radiology, 2003        Reason: Stroke/TIA, determine embolic source    Technique: CT angiography of brain and neck with 100 mL Omnipaque 350.  Maximum intensity projection coronal reformations were obtained at a separate workstation and stored in the patient's permanent medical record.    COMPARISON: None    CTA BRAIN:  VASCULAR FINDINGS:  Major intracranial arteries are patent with no intraluminal filling defect, stenosis, or dissection. Negative for aneurysm or evidence of vasculitis.    Visualized opacified dural venous sinuses are unremarkable.    NONVASCULAR FINDINGS:  No abnormal intra-axial or extra-axial enhancement. Sinuses and mastoids are clear. Lucency involving left anterior mandible is geographic measuring 9 mm, nonspecific.      CTA NECK:  VASCULAR FINDINGS:  Three branch vessel aortic arch anatomy is present.    Left common carotid artery is patent. Minimal calcified plaque affects origin of left internal carotid artery without narrowing. Tortuous  cervical left ICA is otherwise widely patent. Left ECA and branches are patent. Left vertebral artery is patent.    Right common carotid, internal carotid, external carotid, and vertebral arteries are widely patent.    Retropharyngeal course of distal common carotid and proximal internal carotid arteries occur bilaterally. Cervical ICAs are tortuous bilaterally.    NONVASCULAR FINDINGS:  Cervical soft tissues are unremarkable. Moderate degenerative spondylosis occurs at C5-C6. Visualized lung apices are clear.    IMPRESSION:    1. Focal minimal calcified plaque at origin of left cervical ICA, without narrowing.  2. Otherwise negative CTA brain and neck.    Electronically signed by:  Donald Humphrey MD  1/5/2022 1:13 PM CST Workstation: [id number]                             MRA Brain without contrast (Final result)  Result time 01/05/22 12:23:54    Final result by Serg Brambila MD (01/05/22 12:23:54)                 Narrative:    MRA OF THE BRAIN WITHOUT CONTRAST    CMS MANDATED QUALITY DATA - CAROTID - 195    All measurements and percent stenosis described below were determined using NASCET criteria or criteria similar to NASCET, as defined by the Society of Radiologists in Ultrasound Consensus Conference, Radiology, 2003    CLINICAL HISTORY:  61 years (1960) Female Neuro deficit, acute, stroke suspected ER room 2. Extremity weakness. Pt very confused/altered. Unable to follow instructions.; Multiple straps, pads, pillows used to try & keep pt still for scans.; CTA head canceled due to 1.8 creatine. MRA repeated due to pt motion    TECHNIQUE:  MR BRAIN ANGIOGRAPHY WITHOUT IV CONTRAST. 358 images obtained. Noncontrasted, time of flight images were obtained with MIP and 3-D reconstructions at a separate workstation to evaluate the intracranial arterial circulation.    COMPARISON:  None available.    FINDINGS:  Markedly limited, near nondiagnostic MRA of the brain secondary to motion artifact throughout the  exam.    That being said there appears to be some degree of patency of the intracranial internal carotid arteries bilaterally, and symmetric opacification of the [place], MCA and ECA arteries. The posterior communicating arteries are likely present. The vertebral arteries are poorly seen, and the basal artery is present.    IMPRESSION:  Markedly limited, essentially nondiagnostic MRA of the brain, but with at least some flow identified in the intracranial circulation.              .    Electronically signed by:  Serg Brambila MD  1/5/2022 12:23 PM [facility] Workstation: [id number]                             MRI Brain Without Contrast (Final result)  Result time 01/05/22 13:02:05    Final result by Sil Tomas IV, MD (01/05/22 13:02:05)                 Narrative:    MRI of the brain without contrast    HISTORY: Cerebrovascular accident.    This examination is severely degraded by patient motion despite repeated imaging. Repeat MRI is necessary when clinically able.    There is a suspicious area of longitudinally oriented restricted diffusion within the upper regina at the level of the superior cerebellar peduncles, potentially reflecting recent infarction. No additional areas of restricted diffusion identified.    There are apparent areas of remote small lacunar infarction within the left lentiform nucleus and bilateral thalami. There is an apparent small focus of decreased signal which corresponds with area of increased density on recent CT near the right caudate head. This could represent a small punctate focus of recent hemorrhage. Hemosiderin staining is observed within the left basal ganglia and medially within the right cerebellar hemisphere, likely related to remote microhemorrhage.    There are diffuse changes of white matter hyperintensity within the subcortical and periventricular regions of both hemispheres and within the regina, likely a reflection of chronic microvascular ischemia.    There is mild diffuse  prominence of the ventricular system and sulci compatible with involutional changes. There are no extra-axial collections.    Appropriate flow voids are demonstrated in the major blood vessels.    IMPRESSION:    This is examination is of significantly limited diagnostic quality, substantially degraded by patient motion despite repeated imaging. The examination should be repeated when clinically able.    Small area of presumed restricted diffusion within the upper regina suspicious for region of recent small vessel infarction.    Focal area of decreased signal within the right caudate head which corresponds to a punctate focus of increased density on recent CT. A small area of recent punctate hemorrhages not excluded. There are additional foci of hemosiderin staining observed within the leftward basal ganglia and medial right cerebellar hemisphere.    Changes compatible with chronic microvascular ischemia and mild involutional changes.    Electronically signed by:  Sil Tomas MD  1/5/2022 1:02 PM [facility] Workstation: [id number]                             CT Head Without Contrast (Final result)  Result time 01/05/22 11:15:23    Final result by Serg Brambila MD (01/05/22 11:15:23)                 Narrative:    CMS MANDATED QUALITY DATA - CT RADIATION 436    All CT scans at this facility utilize dose modulation, iterative reconstruction, and/or weight based dosing when appropriate to reduce radiation dose to as low as reasonably achievable.    CLINICAL HISTORY:  61 years (1960) Female Neuro deficit, acute, stroke suspected    TECHNIQUE:  CT HEAD WITHOUT IV CONTRAST. 100 images obtained. Axial CT of the brain without contrast using soft tissue and bone algorithm. Please note in the acute setting if there is a clinical concern for an acute stroke MRI would be more sensitive/specific for evaluation of ischemia.    COMPARISON:  Most recent CT from 11/23/2021    FINDINGS:  Rounded hyperattenuating focus in the right  caudate head measuring 3-4 mm (image 25), new from the previous exam and suspicious for a punctate petechial bleed.    Mild diffuse cerebral atrophy for age with moderate periventricular deep cerebral white matter low attenuation, a nonspecific finding in this age group which can be seen in any diffuse white matter process but which is most commonly associated with chronic microvascular ischemic disease. There are scattered atheromatous calcifications in the intracranial internal carotid arteries.    There are small rounded defects in the left basal ganglia (image 23) suggesting prior lacunar infarcts, unchanged from the previous exam.    There is left lateral gaze deviation, consider correlation with physical exam findings. External auditory canals are unremarkable. The visualized paranasal sinuses and mastoid air cells are essentially clear.    IMPRESSION:  1. New punctate hyperattenuating focus in the right basal ganglia suspicious for a small petechial bleed.  2. No hydrocephalus, herniation or midline shift.  3. Additional chronic/involutional findings as noted above.          RESULT NOTIFICATION: These observations were discussed by the dictating physician, by phone and acknowledged by the ordering provider SUSANNA KIRK at 1/5/2022 11:09 AM CST          .    Electronically signed by:  Serg Brambila MD  1/5/2022 11:15 AM CST Workstation: [id number]                               Medications   lactated ringers bolus 1,000 mL (1,000 mLs Intravenous New Bag 1/5/22 1215)   aspirin EC tablet 81 mg (has no administration in time range)   iohexoL (OMNIPAQUE 350) injection 100 mL (100 mLs Intravenous Given 1/5/22 1231)   lorazepam injection 0.5 mg (0.5 mg Intravenous Given 1/5/22 1225)     Medical Decision Making:   Differential Diagnosis:   61-year-old female with right-sided hemiplegia and expressive aphasia and left-sided gaze which is fixed.  Patient's presentation consistent with a acute stroke which is out of  window for tPA treatment given patient's timeline as EMS said patient has been symptomatic since yesterday morning at 10:00 a.m..  Patient case was reviewed with neurologist on-call who reviewed the CT and believes there is a MCA location hyperdensity based on the CT scan so even though creatinine is slightly elevated will hydrate patient and do CT angiogram for further evaluation and tele stroke neurologist consulted to see if patient would be a candidate for neuro intervention.  Right basal ganglia petechial hemorrhage noted on CT per Radiology and given this new finding CTA canceled as patient in MRI at this time.  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  TPA not given as patient not a candidate for tPA.  Neuro critical care recommended patient be admitted here has not a candidate for intervention at this time.  81 mg of aspirin recommended by her neurologist given it is only a petechial bleed however full-dose aspirin not given has not recommended by neurologist.  Hospital Medicine consulted for evaluation for admission and further management.  On repeat exam patient's fixed left-sided gaze improved and patient able to look to both sides and gaze deficits improved             ED Course as of 01/05/22 1318   Wed Jan 05, 2022   1220 MRI done however not clear per tele Stroke Neurology and tele stroke neurologist recommended doing CTA of the head and neck with understanding creatinine is elevated.  Patient will receive hydration.  Will do CTA as recommended by tele stroke neurologist [UM]      ED Course User Index  [UM] Jatinder Parra MD             Clinical Impression:   Final diagnoses:  [I63.9] Stroke  [I62.9] Intracranial hemorrhage (Primary)  [R73.9] Hyperglycemia  [N17.9] Acute kidney injury           ED Disposition Condition    Admit               Jatinder Parra MD  01/05/22 1308       Jatinder Parra MD  01/05/22 [id number]

## 2022-01-05 NOTE — CONSULTS
[facility]  Vascular Neurology  Comprehensive Stroke Center  TeleVascular Neurology Acute Consultation Note      Consults    Consulting Provider: SUSANNA KIRK  Current Providers  No providers found    Patient Location:  [facility] EMERGENCY DEPARTMENT Emergency Department  Spoke hospital nurse at bedside with patient assisting consultant.     Patient information was obtained from primary team.         Assessment/Plan:    60 y/o with HTN, DM, pontine infarct, COVID-19 infection, now with acute onset what appears to be a L MCA syndrome. Out of the window for treatment with iv alteplase.  NIHSS 10 CTH without definite acute abnormality.  MRI/MRA brain is severely degraded by motion but reportedly without acute ischemic changes.  Plan to obtain STAT CTA brain and neck searching for LVO and potential eligibility for EVT.  Will follow results of vascular imaging and make further decision accordingly.    Diagnoses: L MCA syndrome  No new Assessment & Plan notes have been filed under this hospital service since the last note was generated.  Service: Vascular Neurology      STROKE DOCUMENTATION     Acute Stroke Times:   Acute Stroke Times   Last Known Normal Date: 01/04/22  Last Known Normal Time: 1000  Symptom Onset Date: 01/04/22  Symptom Onset Time: 1000  Stroke Team Called Date: 01/05/22  Stroke Team Called Time: 1113  Stroke Team Arrival Date: 01/05/22  Stroke Team Arrival Time: 1118  CT Interpretation Time: 1118  CTA Interpretation Time:  (Pending)  Thrombectomy Recommended:  (Pendig CTA)  MRI Acute Stroke Protocol Interpretation Time: 1118  Decision to Treat Time for IR:  (Pending CTA results)    NIH Scale:  Interval: baseline  1a. Level of Consciousness: 0-->Alert, keenly responsive  1b. LOC Questions: 1-->Answers one question correctly  1c. LOC Commands: 0-->Performs both tasks correctly  2. Best Gaze: 2-->Forced deviation, or total gaze paresis not overcome by the oculocephalic  "maneuver  3. Visual: 0-->No visual loss  4. Facial Palsy: 0-->Normal symmetrical movements  5a. Motor Arm, Left: 0-->No drift, limb holds 90 (or 45) degrees for full 10 secs  5b. Motor Arm, Right: 3-->No effort against gravity, limb falls  6b. Motor Leg, Right: 1-->Drift, leg falls by the end of the 5-sec period but does not hit bed  7. Limb Ataxia: 0-->Absent  8. Sensory: 0-->Normal, no sensory loss  9. Best Language: 2-->Severe aphasia, all communication is through fragmentary expression, great need for inference, questioning, and guessing by the listener. Range of information that can be exchanged is limited, listener carries burden of. . . (see row details)  10. Dysarthria: 0-->Normal  11. Extinction and Inattention (formerly Neglect): 1-->Visual, tactile, auditory, spatial, or personal inattention or extinction to bilateral simultaneous stimulation in one of the sensory modalities   NIHSS 10  Modified [place] Score: 1  [place] Coma Scale:14   ABCD2 Score:    [id number] Score:   HAS -BLED Score:   ICH Score:   Hunt & Jovel Classification:       Blood pressure (!) 129/57, pulse 80, temperature 98.5 °F (36.9 °C), temperature source Oral, resp. rate 18, height 4' 11" (1.499 m), weight 49.9 kg (110 lb), SpO2 99 %.  Alteplase Eligible?: No  Alteplase Recommendation: Alteplase not recommended due to Outside of treatment window   Possible Interventional Revascularization Candidate? Awaiting CTA results from Spoke for determination     Disposition Recommendation: pending further studies    Subjective:     History of Present Illness: 60 y/o with HTN, DM, pontine infarct, COVID-19 infection, brought in due to new onset R sided weakness, decreased responsiveness, and L gaze preference. Noted to have R neglect by ED staff.  No improving.        No notes on file      Woke up with symptoms?: yes    Recent bleeding noted: no  Does the patient take any Blood Thinners? no  Medications: Unknown      Past Medical History: " "hypertension, diabetes, stroke and COVID-19 History    Past Surgical History: no major surgeries within the last 2 weeks    Family History: no relevant history    Social History: unable to obtain    Allergies: No Known Allergies     Review of Systems   Unable to perform ROS: Mental status change     Objective:   Vitals: Blood pressure (!) 129/57, pulse 80, temperature 98.5 °F (36.9 °C), temperature source Oral, resp. rate 18, height 4' 11" (1.499 m), weight 49.9 kg (110 lb), SpO2 99 %.     CT READ: Yes  No hemmorhage. No mass effect. No early infarct signs.    MRI brain: motion degraded but no ostensible stroke seen.  MRA brain: motion degraded, no LVO    Physical Exam  Vitals and nursing note reviewed.   Constitutional:       General: She is not in acute distress.     Appearance: Normal appearance.   HENT:      Head: Normocephalic and atraumatic.      Nose: Nose normal.   Eyes:      Comments: L gaze   Cardiovascular:      Rate and Rhythm: Normal rate and regular rhythm.   Pulmonary:      Effort: No respiratory distress.   Abdominal:      General: There is no distension.   Genitourinary:     Comments: na  Musculoskeletal:      Cervical back: Normal range of motion.      Right lower leg: No edema.      Left lower leg: No edema.   Skin:     Findings: No erythema or rash.   Neurological:      Mental Status: She is alert. She is disoriented.      Cranial Nerves: Cranial nerve deficit present.      Motor: Weakness present.      Comments: Mildly aphasic, disoriented to year, L gaze preference, R sided weakness arm greater than leg, R neglect.   Psychiatric:      Comments: Unable to assess               Recommended the emergency room physician to have a brief discussion with the patient and/or family if available regarding the risks and benefits of treatment, and to briefly document the occurrence of that discussion in his clinical encounter note.     The attending portion of this evaluation, treatment, and documentation " was performed per Aric Sexton MD via audiovisual.    Billing code:  (non-intervention mild to moderate stroke, TIA, some mimics)    · This patient has a critical neurological condition/illness, with some potential for high morbidity and mortality.  · There is a moderate probability for acute neurological change leading to clinical and possibly life-threatening deterioration requiring highest level of physician preparedness for urgent intervention.  · Care was coordinated with other physicians involved in the patient's care.  · Radiologic studies and laboratory data were reviewed and interpreted, and plan of care was re-assessed based on the results.  · Diagnosis, treatment options and prognosis may have been discussed with the patient and/or family members or caregiver.      In your opinion, this was a: Tier 2 Van Positive    Consult End Time: 1220 pm     Aric Sexton MD  Comprehensive Stroke Center  Vascular Neurology   [facility]

## 2022-01-06 LAB
ALBUMIN SERPL BCP-MCNC: 3.7 G/DL (ref 3.5–5.2)
ALP SERPL-CCNC: 68 U/L (ref 55–135)
ALT SERPL W/O P-5'-P-CCNC: 19 U/L (ref 10–44)
ANION GAP SERPL CALC-SCNC: 12 MMOL/L (ref 8–16)
APTT PPP: 31.3 SEC (ref 23.3–35.1)
AST SERPL-CCNC: 18 U/L (ref 10–40)
BACTERIA #/AREA URNS HPF: NEGATIVE /HPF
BASOPHILS # BLD AUTO: 0.06 K/UL (ref 0–0.2)
BASOPHILS NFR BLD: 0.4 % (ref 0–1.9)
BILIRUB SERPL-MCNC: 1 MG/DL (ref 0.1–1)
BILIRUB UR QL STRIP: NEGATIVE
BUN SERPL-MCNC: 38 MG/DL (ref 8–23)
CALCIUM SERPL-MCNC: 9.7 MG/DL (ref 8.7–10.5)
CHLORIDE SERPL-SCNC: 103 MMOL/L (ref 95–110)
CK MB SERPL-MCNC: 2.3 NG/ML (ref 0.1–6.5)
CLARITY UR: CLEAR
CO2 SERPL-SCNC: 23 MMOL/L (ref 23–29)
COLOR UR: YELLOW
CREAT SERPL-MCNC: 1.6 MG/DL (ref 0.5–1.4)
DIFFERENTIAL METHOD: ABNORMAL
EOSINOPHIL # BLD AUTO: 0 K/UL (ref 0–0.5)
EOSINOPHIL NFR BLD: 0.2 % (ref 0–8)
ERYTHROCYTE [DISTWIDTH] IN BLOOD BY AUTOMATED COUNT: 14.9 % (ref 11.5–14.5)
EST. GFR  (AFRICAN AMERICAN): 39.3 ML/MIN/1.73 M^2
EST. GFR  (NON AFRICAN AMERICAN): 34 ML/MIN/1.73 M^2
ESTIMATED AVG GLUCOSE: 318 MG/DL (ref 68–131)
GLUCOSE SERPL-MCNC: 200 MG/DL (ref 70–110)
GLUCOSE SERPL-MCNC: 227 MG/DL (ref 70–110)
GLUCOSE SERPL-MCNC: 241 MG/DL (ref 70–110)
GLUCOSE SERPL-MCNC: 250 MG/DL (ref 70–110)
GLUCOSE SERPL-MCNC: 293 MG/DL (ref 70–110)
GLUCOSE UR QL STRIP: ABNORMAL
HBA1C MFR BLD: 12.7 % (ref 4.5–6.2)
HCT VFR BLD AUTO: 34.8 % (ref 37–48.5)
HGB BLD-MCNC: 12.1 G/DL (ref 12–16)
HGB UR QL STRIP: NEGATIVE
HYALINE CASTS #/AREA URNS LPF: 2 /LPF
IMM GRANULOCYTES # BLD AUTO: 0.06 K/UL (ref 0–0.04)
IMM GRANULOCYTES NFR BLD AUTO: 0.4 % (ref 0–0.5)
INR PPP: 1.2
KETONES UR QL STRIP: NEGATIVE
LEUKOCYTE ESTERASE UR QL STRIP: NEGATIVE
LYMPHOCYTES # BLD AUTO: 2.3 K/UL (ref 1–4.8)
LYMPHOCYTES NFR BLD: 16.5 % (ref 18–48)
MAGNESIUM SERPL-MCNC: 2 MG/DL (ref 1.6–2.6)
MCH RBC QN AUTO: 27.4 PG (ref 27–31)
MCHC RBC AUTO-ENTMCNC: 34.8 G/DL (ref 32–36)
MCV RBC AUTO: 79 FL (ref 82–98)
MICROSCOPIC COMMENT: ABNORMAL
MONOCYTES # BLD AUTO: 1.3 K/UL (ref 0.3–1)
MONOCYTES NFR BLD: 9.7 % (ref 4–15)
NEUTROPHILS # BLD AUTO: 9.9 K/UL (ref 1.8–7.7)
NEUTROPHILS NFR BLD: 72.8 % (ref 38–73)
NITRITE UR QL STRIP: NEGATIVE
NRBC BLD-RTO: 0 /100 WBC
PH UR STRIP: 6 [PH] (ref 5–8)
PHOSPHATE SERPL-MCNC: 4 MG/DL (ref 2.7–4.5)
PLATELET # BLD AUTO: 487 K/UL (ref 150–450)
PMV BLD AUTO: 10.9 FL (ref 9.2–12.9)
POTASSIUM SERPL-SCNC: 4 MMOL/L (ref 3.5–5.1)
PROT SERPL-MCNC: 7.5 G/DL (ref 6–8.4)
PROT UR QL STRIP: ABNORMAL
PROTHROMBIN TIME: 14.2 SEC (ref 11.4–13.7)
RBC # BLD AUTO: 4.42 M/UL (ref 4–5.4)
RBC #/AREA URNS HPF: 28 /HPF (ref 0–4)
SODIUM SERPL-SCNC: 138 MMOL/L (ref 136–145)
SP GR UR STRIP: >1.03 (ref 1–1.03)
SQUAMOUS #/AREA URNS HPF: 2 /HPF
TROPONIN I SERPL DL<=0.01 NG/ML-MCNC: 0.04 NG/ML
URN SPEC COLLECT METH UR: ABNORMAL
UROBILINOGEN UR STRIP-ACNC: NEGATIVE EU/DL
WBC # BLD AUTO: 13.64 K/UL (ref 3.9–12.7)
WBC #/AREA URNS HPF: 2 /HPF (ref 0–5)
YEAST URNS QL MICRO: ABNORMAL

## 2022-01-06 PROCEDURE — 84100 ASSAY OF PHOSPHORUS: CPT | Performed by: INTERNAL MEDICINE

## 2022-01-06 PROCEDURE — 82553 CREATINE MB FRACTION: CPT | Performed by: INTERNAL MEDICINE

## 2022-01-06 PROCEDURE — 85730 THROMBOPLASTIN TIME PARTIAL: CPT | Performed by: INTERNAL MEDICINE

## 2022-01-06 PROCEDURE — 92610 EVALUATE SWALLOWING FUNCTION: CPT

## 2022-01-06 PROCEDURE — 36415 COLL VENOUS BLD VENIPUNCTURE: CPT | Performed by: INTERNAL MEDICINE

## 2022-01-06 PROCEDURE — 84484 ASSAY OF TROPONIN QUANT: CPT | Performed by: INTERNAL MEDICINE

## 2022-01-06 PROCEDURE — 63600175 PHARM REV CODE 636 W HCPCS: Performed by: INTERNAL MEDICINE

## 2022-01-06 PROCEDURE — 97165 OT EVAL LOW COMPLEX 30 MIN: CPT

## 2022-01-06 PROCEDURE — 85610 PROTHROMBIN TIME: CPT | Performed by: INTERNAL MEDICINE

## 2022-01-06 PROCEDURE — 80053 COMPREHEN METABOLIC PANEL: CPT | Performed by: INTERNAL MEDICINE

## 2022-01-06 PROCEDURE — 85025 COMPLETE CBC W/AUTO DIFF WBC: CPT | Performed by: INTERNAL MEDICINE

## 2022-01-06 PROCEDURE — 97535 SELF CARE MNGMENT TRAINING: CPT

## 2022-01-06 PROCEDURE — 81001 URINALYSIS AUTO W/SCOPE: CPT | Performed by: INTERNAL MEDICINE

## 2022-01-06 PROCEDURE — 21400001 HC TELEMETRY ROOM

## 2022-01-06 PROCEDURE — 25000003 PHARM REV CODE 250: Performed by: STUDENT IN AN ORGANIZED HEALTH CARE EDUCATION/TRAINING PROGRAM

## 2022-01-06 PROCEDURE — 97116 GAIT TRAINING THERAPY: CPT

## 2022-01-06 PROCEDURE — 83735 ASSAY OF MAGNESIUM: CPT | Performed by: INTERNAL MEDICINE

## 2022-01-06 PROCEDURE — 97161 PT EVAL LOW COMPLEX 20 MIN: CPT

## 2022-01-06 PROCEDURE — 25000003 PHARM REV CODE 250: Performed by: INTERNAL MEDICINE

## 2022-01-06 RX ORDER — NAPROXEN SODIUM 220 MG/1
81 TABLET, FILM COATED ORAL DAILY
Status: DISCONTINUED | OUTPATIENT
Start: 2022-01-06 | End: 2022-01-07 | Stop reason: HOSPADM

## 2022-01-06 RX ADMIN — INSULIN ASPART 6 UNITS: 100 INJECTION, SOLUTION INTRAVENOUS; SUBCUTANEOUS at 01:01

## 2022-01-06 RX ADMIN — INSULIN ASPART 2 UNITS: 100 INJECTION, SOLUTION INTRAVENOUS; SUBCUTANEOUS at 06:01

## 2022-01-06 RX ADMIN — ATORVASTATIN CALCIUM 80 MG: 40 TABLET, FILM COATED ORAL at 01:01

## 2022-01-06 RX ADMIN — ASPIRIN 81 MG 81 MG: 81 TABLET ORAL at 01:01

## 2022-01-06 NOTE — H&P
[facility] - Emergency Dept  Hospital Medicine  History & Physical    Patient Name: Steff Johnson  MRN: [id number]  Patient Class: IP- Inpatient  Admission Date: 1/5/2022  Attending Physician: Roger Berry MD   Primary Care Provider: Cristina Ren MD         Patient information was obtained from patient and ER records.     Subjective:     Principal Problem:Acute CVA (cerebrovascular accident)    Chief Complaint:   Chief Complaint   Patient presents with    Extremity Weakness     L sided weakness since 1000 yesterday         HPI: 61 year old patient getting admitted with acute CVA   Pt started having right-sided weakness and slurred speech and was having decreased responsiveness for over 24 hours.    Because patient did not improve over the next 24 hour ,family contacted EMS and patient was transported here.    Clinically and imaging wise pt had CVA and pt got admitted  Pt unable to provide further H/o      Past Medical History:   Diagnosis Date    Arthritis     Complete tear of left rotator cuff 11/9/2017    COPD (chronic obstructive pulmonary disease)     COVID-19 infection 7/2/2021    Diabetes mellitus     H/o Cerebrovascular accident (CVA) of left pontine structure 12/12/2019    Hypertension     Stroke     Wears glasses        Past Surgical History:   Procedure Laterality Date    COLONOSCOPY N/A 4/11/2017    Procedure: COLONOSCOPY;  Surgeon: Jared Antonio MD;  Location: [facility];  Service: Endoscopy;  Laterality: N/A;    HYSTERECTOMY      OOPHORECTOMY      SHOULDER SURGERY      R       Review of patient's allergies indicates:  No Known Allergies    No current facility-administered medications on file prior to encounter.     Current Outpatient Medications on File Prior to Encounter   Medication Sig    aspirin 81 MG Chew Take 1 tablet (81 mg total) by mouth once daily.    atorvastatin (LIPITOR) 40 MG tablet Take 1 tablet (40 mg total) by mouth once daily.    blood glucose control,  "low Soln Use as directed    blood-glucose meter (TRUE METRIX GLUCOSE METER) kit Use as instructed    chlorthalidone (HYGROTEN) 50 MG Tab Take 1 tablet (50 mg total) by mouth once daily.    cloNIDine 0.1 mg/24 hr td ptwk (CATAPRES) 0.1 mg/24 hr Place 1 patch onto the skin every 7 days.    ergocalciferol (ERGOCALCIFEROL) 50,000 unit Cap TK 1 C PO Q 7 DAYS (Patient taking differently: Take 50,000 Units by mouth every 7 days. TK 1 C PO Q 7 DAYS  WEDNESDAYS)    hydrALAZINE (APRESOLINE) 100 MG tablet Take 1 tablet (100 mg total) by mouth 3 (three) times daily.    HYDROcodone-acetaminophen (NORCO)  mg per tablet Take 1 tablet by mouth 2 (two) times daily as needed.    insulin aspart U-100 (NOVOLOG FLEXPEN U-100 INSULIN) 100 unit/mL (3 mL) InPn pen Inject 12 Units into the skin 3 (three) times daily before meals. 12 u AC + correction MaxTDD 60u    insulin degludec (TRESIBA FLEXTOUCH U-100) 100 unit/mL (3 mL) insulin pen Inject 40 Units into the skin once daily.    lancets Misc To check BG 2 times daily, to use with insurance preferred meter    metoprolol succinate (TOPROL-XL) 100 MG 24 hr tablet Take 1 tablet (100 mg total) by mouth once daily.    pen needle, diabetic (BD MI 2ND GEN PEN NEEDLE) 32 gauge x 5/32" Ndle Uses 4 daily    pen needle, diabetic (BD MI 2ND GEN PEN NEEDLE) 32 gauge x 5/32" Ndle Inject daily    umeclidinium (INCRUSE ELLIPTA) 62.5 mcg/actuation inhalation capsule Inhale 1 capsule (63 mcg total) into the lungs once daily. Controller     Family History     Problem Relation (Age of Onset)    Anemia Daughter    Asthma Mother    Diabetes Mother, Father, Brother    Hypertension Mother, Brother        Tobacco Use    Smoking status: Never Smoker    Smokeless tobacco: Never Used   Substance and Sexual Activity    Alcohol use: No    Drug use: No    Sexual activity: Not Currently     Review of Systems   Unable to perform ROS: Mental status change     Objective:     Vital Signs (Most " Recent):  Temp: 98.3 °F (36.8 °C) (01/05/22 1500)  Pulse: 101 (01/05/22 1718)  Resp: (!) 21 (01/05/22 1718)  BP: (!) 154/68 (01/05/22 1718)  SpO2: 99 % (01/05/22 1718) Vital Signs (24h Range):  Temp:  [98.3 °F (36.8 °C)-98.5 °F (36.9 °C)] 98.3 °F (36.8 °C)  Pulse:  [] 101  Resp:  [18-28] 21  SpO2:  [93 %-99 %] 99 %  BP: (129-175)/() 154/68     Weight: 49.9 kg (110 lb)  Body mass index is 22.22 kg/m².    Physical Exam  Vitals and nursing note reviewed.   Constitutional:       General: She is not in acute distress.  HENT:      Head: Atraumatic.      Right Ear: External ear normal.      Left Ear: External ear normal.      Nose: Nose normal.      Mouth/Throat:      Mouth: Mucous membranes are moist.   Eyes:      General: No scleral icterus.     Extraocular Movements: EOM normal.   Cardiovascular:      Rate and Rhythm: Normal rate.   Pulmonary:      Effort: Pulmonary effort is normal.   Abdominal:      General: There is no distension.   Musculoskeletal:         General: No deformity or edema.      Cervical back: Normal range of motion.   Skin:     General: Skin is warm.   Neurological:      Mental Status: She is alert.      Comments: R sided weakness   Psychiatric:         Mood and Affect: Mood and affect normal.           CRANIAL NERVES     CN III, IV, VI   Extraocular motions are normal.        Significant Labs:   All pertinent labs within the past 24 hours have been reviewed.  CBC:   Recent Labs   Lab 01/05/22  1034   WBC 12.33   HGB 12.7   HCT 35.9*   *     CMP:   Recent Labs   Lab 01/05/22  1034   *   K 4.0   CL 98   CO2 21*   *   BUN 33*   CREATININE 1.9*   CALCIUM 9.6   PROT 8.2   ALBUMIN 4.0   BILITOT 1.4*   ALKPHOS 73   AST 28   ALT 25   ANIONGAP 16   EGFRNONAA 27.7*       Significant Imaging: I have reviewed all pertinent imaging results/findings within the past 24 hours.    Assessment/Plan:     * Acute CVA (cerebrovascular accident)  Patient failed initial swallow study  Repeat  CT brain Tmrw AM in view with punctuate;Micro hgges reported in imaging   Neurology/PT/OT/ST consult        COURTNEY (acute kidney injury)  Monitor Renal panels closely       H/o Cerebrovascular accident (CVA) of left pontine structure  Per Chart review       Continuous opioid dependence  Per chart review       Poorly controlled diabetes mellitus  Poorly controlled DM on insulin regime      Hypertension associated with diabetes  On Clonidine Patch at home        VTE Risk Mitigation (From admission, onward)         Ordered     IP VTE HIGH RISK PATIENT  Once         01/05/22 1345     Place sequential compression device  Until discontinued         01/05/22 1345                   Roger Berry MD  Department of Hospital Medicine   [facility] - Emergency Dept

## 2022-01-06 NOTE — PLAN OF CARE
Chopped diet ordered, will continue to follow. SLP at next level recommended.     Problem: SLP Goal  Goal: SLP Goal  Description: 1. Assessment of cognitive communication  2. Consume soft solids and thin liquids w/out overt sign of aspiration or airway threat  3. Reduce oral residue through dry swallows and liquid wash given MIN A  Outcome: Ongoing, Progressing

## 2022-01-06 NOTE — PROGRESS NOTES
[facility]  Department of Neurology  Neurology Progress Note        PATIENT NAME: Steff Johnson  MRN: [id number]  CSN: [id number]      TODAY'S DATE: 01/06/2022  ADMIT DATE: 1/5/2022                            CONSULTING PROVIDER: Jennifer Tony MD  CONSULT REQUESTED BY: Roger Berry MD      Reason for consult: CVA       History obtained from chart review and the patient.    HPI: Steff Johnson is a 61 y.o. female with a history of HTN, stroke, presented with right-sided weakness and slurred speech and was having decreased responsiveness for over 24 hours.     Family has not seen any improvement in one day and they brought her to the ER. In the ER, she had R side weakness, dysarthria. CTH showed R BG hyper density.     On my exam, she was drowsy but wakes up to stimulus and was oriented x3. She had R Facial droop, mild R side weakness, dysarthria. CTA showed no LVO.     Tele stroke eval was done and she was not a candidate for tPA or KUMAR.      1/6/22:  Patient was seen and examined by me today.  Her right-sided weakness and dysarthria seemed to be mildly improved.  There were no acute events overnight, and patient denies any worsening of her symptoms.    CURRENT SCHEDULED MEDICATIONS:   atorvastatin  80 mg Per NG tube Daily    cloNIDine 0.1 mg/24 hr td ptwk  1 patch Transdermal Q7 Days     CURRENT INFUSIONS:    CURRENT PRN MEDICATIONS:  calcium chloride IVPB, calcium chloride IVPB, calcium chloride IVPB, dextrose 50%, glucagon (human recombinant), HYDROmorphone, insulin aspart U-100, labetalol, magnesium oxide, magnesium sulfate IVPB, magnesium sulfate IVPB, magnesium sulfate IVPB, magnesium sulfate IVPB, ondansetron, potassium chloride in water, potassium chloride in water, potassium chloride in water, potassium chloride in water, potassium chloride, potassium chloride, potassium chloride, potassium chloride, sodium chloride 0.9%       PHYSICAL EXAM:  Patient Vitals for the past 24 hrs:   BP Temp  "Temp src Pulse Resp SpO2 Height Weight   01/06/22 0715 (!) 156/76 98.5 °F (36.9 °C) Oral 88 18 97 % -- --   01/06/22 0300 (!) 158/85 98.6 °F (37 °C) Oral 94 18 98 % -- --   01/05/22 2300 136/66 98.9 °F (37.2 °C) Oral 93 16 95 % -- --   01/05/22 1951 136/65 99 °F (37.2 °C) Oral 88 18 95 % -- 47.3 kg (104 lb 4.4 oz)   01/05/22 1854 -- -- -- -- 15 -- -- --   01/05/22 1830 (!) 180/79 -- -- -- -- -- -- --   01/05/22 1810 (!) 161/67 -- -- 106 -- 98 % -- --   01/05/22 1720 (!) 154/68 -- -- 102 -- 99 % -- --   01/05/22 1718 (!) 154/68 -- -- 101 (!) 21 99 % -- --   01/05/22 1715 (!) 164/83 -- -- 101 -- 99 % -- --   01/05/22 1700 (!) 165/71 -- -- 100 -- 99 % -- --   01/05/22 1630 (!) 165/69 -- -- 102 -- 98 % -- --   01/05/22 1600 (!) 172/74 -- -- 100 (!) 22 99 % -- --   01/05/22 1530 (!) 172/74 -- -- 99 20 98 % -- --   01/05/22 1510 (!) 160/72 -- -- -- -- -- -- --   01/05/22 1500 (!) 160/72 98.3 °F (36.8 °C) Oral 98 -- 99 % -- --   01/05/22 1430 (!) 175/74 -- -- 91 -- 99 % -- --   01/05/22 1400 (!) 161/100 -- -- 97 -- 96 % -- --   01/05/22 1330 (!) 163/84 -- -- 92 -- 97 % -- --   01/05/22 1300 (!) 147/67 -- -- 93 (!) 28 97 % -- --   01/05/22 1240 (!) 142/68 -- -- 100 -- (!) 93 % -- --   01/05/22 1100 (!) 144/66 -- -- -- -- -- -- --   01/05/22 1030 (!) 129/57 98.5 °F (36.9 °C) Oral 80 18 99 % 4' 11" (1.499 m) 49.9 kg (110 lb)       GENERAL APPEARANCE: Drowsy, well-developed, well-nourished female in no acute distress.  HEENT: Normocephalic and atraumatic. PERRL. Oropharynx unremarkable.  PULM: Normal respiratory effort. No accessory muscle use.  CV: RRR.  ABDOMEN: Soft, nontender.  EXTREMITIES: No obvious signs of vascular compromise. Pulses present. No cyanosis, clubbing or edema.  SKIN: Clear; no rashes, lesions or skin breaks in exposed areas.    NEURO:  MENTAL STATUS: Awake and alert, oriented x3, speech is slurred.  Affect euthymic.    CRANIAL NERVES:  CN I: Not tested.  CN II: Fundoscopic exam deferred.  CN III, IV, VI: " Pupils equal, round and reactive to light.  Extraocular movements full and intact.  CN V: Facial sensation normal.  CN VII: Facial asymmetry noted for right facial droop.  CN VIII: Hearing grossly normal and equal bilaterally.  No skew deviation or pathologic nystagmus.  CN IX, X: Palate elevates symmetrically. Speech/articulation is dysarthric.  CN XI: Shoulder shrug and chin rotation equal with good strength.  CN XII: Tongue protrusion midline.    MOTOR:  Bulk normal. Tone normal and symmetric throughout.  Abnormal movements absent.  Tremor: none present.  Strength Right upper extremity 3/5, right lower extremity 4+/5.  Left upper and lower extremity 5/5..    REFLEXES:  DTRs 2+ throughout.  Plantar response equivocal bilaterally.  SENSATION: grossly intact throughout.  COORDINATION: normal finger-to-nose.  STATION: not tested.  GAIT: not tested.      NIHSS:  1a      Level of Consciousness (alert, drowsy, etc.):   0=alert; keenly responsive  1b.     Level of Consciousness Questions (month, age): 0= able to answer both questions  1c.     Level of Consciousness Commands (open, close eyes, make fist, let go):  0=Answers both tasks correctly  2.      Best Gaze (eyes open - patient follows examiner's finger or face):      0=normal  3.      Visual (introduce visual stimulus/threat to patient's visual field quadrants):  0=No visual loss  4.      Facial Palsy (show teeth, raise eyebrows and squeeze eyes shut):        0=Normal symmetric movement  5a.     Motor Arm - Left (elevate extremity 90 degrees and score drift/movement):       0=No drift, limb holds 90 (or 45) degrees for full 10 seconds  5b.     Motor Arm - Right (elevate extremity 90 degrees and score drift/movement):      2=Some effort against gravity, limb cannot get to or maintain (if cured) 90 (or 45) degrees, drifts down to bed, but has some effort against gravity  6a.     Motor Leg - Left (elevate extremity 30 degrees and score drift/movement):       0=No  drift, limb holds 90 (or 45) degrees for full 10 seconds  6b      Motor Leg - Right (elevate extremity 30 degrees and score drift/movement):      0=No drift, limb holds 90 (or 45) degrees for full 10 seconds immune there  7.      Limb Ataxia (finger-nose, heel down shin):      0=Absent  8.      Sensory (pin prick to face, arm, trunk, and leg - compare side to side):        0=Normal; no sensory loss  9.      Best Language (name items, describe a picture and read sentences):      0=No aphasia, normal  10.     Dysarthria (evaluate speech clarity by patient repeating listed words): 2=Severe; patient speech is so slurred as to be unintelligible in the absence of or our of proportion to any dysphagia, or is mute/anarthric  11.     Extinction and Inattention (use prior testing to identify neglect or double simultaneous stimuli testing):      0=No abnormality          NIH Stroke Scale Total:         4      Labs:  Recent Labs   Lab 01/05/22  1034 01/06/22  0449   * 138   K 4.0 4.0   CL 98 103   CO2 21* 23   BUN 33* 38*   CREATININE 1.9* 1.6*   * 227*   CALCIUM 9.6 9.7   PHOS  --  4.0   MG  --  2.0     Recent Labs   Lab 01/05/22  1034 01/06/22  0449   WBC 12.33 13.64*   HGB 12.7 12.1   HCT 35.9* 34.8*   * 487*     Recent Labs   Lab 01/05/22  1034 01/06/22  0449   ALBUMIN 4.0 3.7   PROT 8.2 7.5   BILITOT 1.4* 1.0   ALKPHOS 73 68   ALT 25 19   AST 28 18     Lab Results   Component Value Date    INR 1.2 01/06/2022     Lab Results   Component Value Date    TRIG 64 01/05/2022    HDL 93 (H) 01/05/2022    CHOLHDL 45.1 01/05/2022     Lab Results   Component Value Date    HGBA1C 14.8 (H) 11/24/2021     No results found for: PROTEINCSF, GLUCCSF, WBCCSF    Imaging:  I have reviewed and interpreted the pertinent imaging and lab results.      CT Head Without Contrast  CT head without contrast    CLINICAL DATA: A    CMS MANDATED QUALITY DATA - CT RADIATION  436    All CT scans at this facility utilize dose modulation,  iterative reconstruction, and/or weight based dosing when appropriate to reduce radiation dose to as low as reasonably achievable.    Findings: Thin section axial noncontrast images are compared to January 5, 2022, as well as a prior study from November 2021.    Tiny hyperdensity suspicious for petechial hemorrhage at the caudate nucleus on the right is unchanged. No new areas of hemorrhage are identified.    There is no intracranial mass or midline shift. Ventricles and sulci are  Prominent. There are no pathologic extra-axial fluid collections.    There is no CT evidence of acute cortical ischemic change. Changes of moderate chronic microvascular white matter disease are noted. The cerebellum and brainstem are unremarkable.    IMPRESSION:  1. Stable CT appearance of the brain compared to January 5. Specifically, tiny hyperdensity at the caudate nucleus on the right, presumably a small focus of petechial hemorrhage, is stable.    Electronically signed by:  Heri Stanford MD  1/6/2022 9:27 AM CST Workstation: [id number]         ASSESSMENT & PLAN:    Acute ischemic pontine stroke  Acute intracerebral hemorrhage  Acute lacunar infarct in the regina an acute hemorrhage in the caudate likely secondary to small-vessel disease from hypertension and uncontrolled diabetes.    Workup  · CTH:  Motion degraded study.  Hypodensity in the right basal ganglia likely small acute hemorrhage  · CTA head and neck:  No large vessel occlusion  · MRI brain:  Motion degraded study.  Acute infarcts in the regina.  Likely small acute hemorrhage in the right caudate, basal ganglia microhemorrhages consistent with small hypertensive hemorrhages  · ECHO:  pending   · LDL: 206  · HbA1c: 12.7       Plan  · Admitted for further stroke workup  · Secondary stroke prevention with aspirin 81 mg daily and atorvastatin 80 mg daily  · Keep BP between 140 and 160 systolic  · PT OT  · Speech therapy  · DVT prophylaxis with chemo/SCD  prophylaxis  · Extensively discussed lifestyle modifications as prophylactic measures for stroke prevention including smoking cessation, adequate blood pressure management, healthy diet and regular exercise.        Thank you kindly for including us in the care of this patient. Please do not hesitate to contact us with any questions.      50 minutes of care time has been spent evaluating with the patient. Time includes chart review not limited to diagnostic imaging, labs, and vitals, patient assessment, discussion with family and nursing, current order evaluations, and new order entries.       Jennifer Tony MD  Neurology/Vascular Neurology  Date of Service: 01/06/2022

## 2022-01-06 NOTE — ASSESSMENT & PLAN NOTE
Patient failed initial swallow study  Repeat CT brain Tmrw AM in view with punctuate;Micro hgges reported in imaging   Neurology/PT/OT/ST consult

## 2022-01-06 NOTE — PT/OT/SLP EVAL
Physical Therapy Evaluation    Patient Name:  Steff Johnson   MRN:  [id number]    Recommendations:     Discharge Recommendations:  home health PT   Discharge Equipment Recommendations: none   Barriers to discharge: increase assist with mobility    Assessment:     Steff Johnson is a 61 y.o. female admitted with a medical diagnosis of Acute CVA (cerebrovascular accident).  She presents with the following impairments/functional limitations:  weakness,impaired endurance,impaired self care skills,impaired functional mobilty,gait instability,impaired balance,decreased lower extremity function,decreased safety awareness,impaired cardiopulmonary response to activity.    Pt found in bed with HOB elevated.  at bedside. Pt agreeable to visit. Pt ambulated 40 ft with RW and min A greatest for RW management. VI for proximity to RW and upright stance/upward gaze.     Rehab Prognosis: Fair; patient would benefit from acute skilled PT services to address these deficits and reach maximum level of function.    Recent Surgery: * No surgery found *      Plan:     During this hospitalization, patient to be seen 6 x/week to address the identified rehab impairments via gait training,therapeutic activities,therapeutic exercises,neuromuscular re-education and progress toward the following goals:    · Plan of Care Expires:  02/06/22    Subjective     Chief Complaint: ready to go home  Patient/Family Comments/goals: ready to go home  Pain/Comfort:  · Pain Rating 1: 0/10    Patients cultural, spiritual, Yazidism conflicts given the current situation: no    Living Environment:  Pt lives in a trailer with her  and ramp access. SPC previously transitioning to RW and assist from   Prior to admission, patients level of function was ax1 RW.  Equipment used at home: walker, rolling.  DME owned (not currently used): single point cane, bedside commode, shower chair and wheelchair.  Upon discharge, patient will have assistance  from .    Objective:     Communicated with RN prior to session.  Patient found HOB elevated with peripheral IV,telemetry  upon PT entry to room.    General Precautions: Standard, fall   Orthopedic Precautions:N/A   Braces: N/A  Respiratory Status: Room air    Exams:  · RLE ROM: WFL  · RLE Strength: 4/5  · LLE ROM: WFL  · LLE Strength: 4+/5    Functional Mobility:  · Bed Mobility:     · Scooting: supervision  · Supine to Sit: supervision  · Sit to Supine: supervision  · Transfers:     · Sit to Stand:  contact guard assistance with rolling walker  · Gait: 40 ft with RW, min A, decrease foot clearance and decrease stride length VI for upright stance/upward gaze,     Therapeutic Activities and Exercises:   Pt educated on POC, discharge recommendation, importance of consistent RW use, RW management, optimal gait pattern, importance of time OOB, need for assist with mobility, use of call bell to seek assistance as needed and fall prevention      AM-PAC 6 CLICK MOBILITY  Total Score:15     Patient left HOB elevated with all lines intact, call button in reach, bed alarm on and  present.    GOALS:   Multidisciplinary Problems     Physical Therapy Goals        Problem: Physical Therapy Goal    Goal Priority Disciplines Outcome Goal Variances Interventions   Physical Therapy Goal     PT, PT/OT Ongoing, Progressing     Description: Goals to be met by: Discharge     Patient will increase functional independence with mobility by performin. Supine to sit with Independent  2. Sit to stand transfer with Supervision  3. Bed to chair transfer with Supervision using Rolling Walker  4. Gait  x 100 feet with Supervision using Rolling Walker.                          History:     Past Medical History:   Diagnosis Date    Arthritis     Complete tear of left rotator cuff 2017    COPD (chronic obstructive pulmonary disease)     COVID-19 infection 2021    Diabetes mellitus     H/o Cerebrovascular accident  (CVA) of left pontine structure 12/12/2019    Hypertension     Stroke     Wears glasses        Past Surgical History:   Procedure Laterality Date    COLONOSCOPY N/A 4/11/2017    Procedure: COLONOSCOPY;  Surgeon: Jared Antonio MD;  Location: [facility];  Service: Endoscopy;  Laterality: N/A;    HYSTERECTOMY      OOPHORECTOMY      SHOULDER SURGERY      R       Time Tracking:     PT Received On: 01/06/22  PT Start Time: 1036     PT Stop Time: 1049  PT Total Time (min): 13 min     Billable Minutes: Evaluation 5 and Gait Training 8      01/06/2022

## 2022-01-06 NOTE — SUBJECTIVE & OBJECTIVE
Past Medical History:   Diagnosis Date    Arthritis     Complete tear of left rotator cuff 11/9/2017    COPD (chronic obstructive pulmonary disease)     COVID-19 infection 7/2/2021    Diabetes mellitus     H/o Cerebrovascular accident (CVA) of left pontine structure 12/12/2019    Hypertension     Stroke     Wears glasses        Past Surgical History:   Procedure Laterality Date    COLONOSCOPY N/A 4/11/2017    Procedure: COLONOSCOPY;  Surgeon: Jared Antonio MD;  Location: [facility];  Service: Endoscopy;  Laterality: N/A;    HYSTERECTOMY      OOPHORECTOMY      SHOULDER SURGERY      R       Review of patient's allergies indicates:  No Known Allergies    No current facility-administered medications on file prior to encounter.     Current Outpatient Medications on File Prior to Encounter   Medication Sig    aspirin 81 MG Chew Take 1 tablet (81 mg total) by mouth once daily.    atorvastatin (LIPITOR) 40 MG tablet Take 1 tablet (40 mg total) by mouth once daily.    blood glucose control, low Soln Use as directed    blood-glucose meter (TRUE METRIX GLUCOSE METER) kit Use as instructed    chlorthalidone (HYGROTEN) 50 MG Tab Take 1 tablet (50 mg total) by mouth once daily.    cloNIDine 0.1 mg/24 hr td ptwk (CATAPRES) 0.1 mg/24 hr Place 1 patch onto the skin every 7 days.    ergocalciferol (ERGOCALCIFEROL) 50,000 unit Cap TK 1 C PO Q 7 DAYS (Patient taking differently: Take 50,000 Units by mouth every 7 days. TK 1 C PO Q 7 DAYS  WEDNESDAYS)    hydrALAZINE (APRESOLINE) 100 MG tablet Take 1 tablet (100 mg total) by mouth 3 (three) times daily.    HYDROcodone-acetaminophen (NORCO)  mg per tablet Take 1 tablet by mouth 2 (two) times daily as needed.    insulin aspart U-100 (NOVOLOG FLEXPEN U-100 INSULIN) 100 unit/mL (3 mL) InPn pen Inject 12 Units into the skin 3 (three) times daily before meals. 12 u AC + correction MaxTDD 60u    insulin degludec (TRESIBA FLEXTOUCH U-100) 100 unit/mL (3 mL)  "insulin pen Inject 40 Units into the skin once daily.    lancets Misc To check BG 2 times daily, to use with insurance preferred meter    metoprolol succinate (TOPROL-XL) 100 MG 24 hr tablet Take 1 tablet (100 mg total) by mouth once daily.    pen needle, diabetic (BD MI 2ND GEN PEN NEEDLE) 32 gauge x 5/32" Ndle Uses 4 daily    pen needle, diabetic (BD MI 2ND GEN PEN NEEDLE) 32 gauge x 5/32" Ndle Inject daily    umeclidinium (INCRUSE ELLIPTA) 62.5 mcg/actuation inhalation capsule Inhale 1 capsule (63 mcg total) into the lungs once daily. Controller     Family History     Problem Relation (Age of Onset)    Anemia Daughter    Asthma Mother    Diabetes Mother, Father, Brother    Hypertension Mother, Brother        Tobacco Use    Smoking status: Never Smoker    Smokeless tobacco: Never Used   Substance and Sexual Activity    Alcohol use: No    Drug use: No    Sexual activity: Not Currently     Review of Systems   Unable to perform ROS: Mental status change     Objective:     Vital Signs (Most Recent):  Temp: 98.3 °F (36.8 °C) (01/05/22 1500)  Pulse: 101 (01/05/22 1718)  Resp: (!) 21 (01/05/22 1718)  BP: (!) 154/68 (01/05/22 1718)  SpO2: 99 % (01/05/22 1718) Vital Signs (24h Range):  Temp:  [98.3 °F (36.8 °C)-98.5 °F (36.9 °C)] 98.3 °F (36.8 °C)  Pulse:  [] 101  Resp:  [18-28] 21  SpO2:  [93 %-99 %] 99 %  BP: (129-175)/() 154/68     Weight: 49.9 kg (110 lb)  Body mass index is 22.22 kg/m².    Physical Exam  Vitals and nursing note reviewed.   Constitutional:       General: She is not in acute distress.  HENT:      Head: Atraumatic.      Right Ear: External ear normal.      Left Ear: External ear normal.      Nose: Nose normal.      Mouth/Throat:      Mouth: Mucous membranes are moist.   Eyes:      General: No scleral icterus.     Extraocular Movements: EOM normal.   Cardiovascular:      Rate and Rhythm: Normal rate.   Pulmonary:      Effort: Pulmonary effort is normal.   Abdominal:      General: " There is no distension.   Musculoskeletal:         General: No deformity or edema.      Cervical back: Normal range of motion.   Skin:     General: Skin is warm.   Neurological:      Mental Status: She is alert.      Comments: R sided weakness   Psychiatric:         Mood and Affect: Mood and affect normal.           CRANIAL NERVES     CN III, IV, VI   Extraocular motions are normal.        Significant Labs:   All pertinent labs within the past 24 hours have been reviewed.  CBC:   Recent Labs   Lab 01/05/22  1034   WBC 12.33   HGB 12.7   HCT 35.9*   *     CMP:   Recent Labs   Lab 01/05/22  1034   *   K 4.0   CL 98   CO2 21*   *   BUN 33*   CREATININE 1.9*   CALCIUM 9.6   PROT 8.2   ALBUMIN 4.0   BILITOT 1.4*   ALKPHOS 73   AST 28   ALT 25   ANIONGAP 16   EGFRNONAA 27.7*       Significant Imaging: I have reviewed all pertinent imaging results/findings within the past 24 hours.

## 2022-01-06 NOTE — HPI
61 year old patient getting admitted with acute CVA   Pt started having right-sided weakness and slurred speech and was having decreased responsiveness for over 24 hours.    Because patient did not improve over the next 24 hour ,family contacted EMS and patient was transported here.    Clinically and imaging wise pt had CVA and pt got admitted  Pt unable to provide further H/o

## 2022-01-06 NOTE — PLAN OF CARE
[facility]  Initial Discharge Assessment       Primary Care Provider: Cristina Ren MD    Admission Diagnosis: Acute CVA (cerebrovascular accident) [I63.9]    Admission Date: 1/5/2022  Expected Discharge Date: 1/7/2022     Assessment completed with pt and fibrody Mock Figueroa at bedside. Pt appeared somewhat confused during assessment, as she told this  she will soon be turning 20 (she does have an upcoming birthday on [birth year] but will be turning 62). Pt lives with her fiance and brother. Pt has DME at home which she does not typically use. Pt did not have home health prior to this admission but her fiance believes she may need home health at discharge to help with medication management. Of note, pt reports she is not prescribed any medications and is unable to remember her last doctor's appointment. Her fiance complains of an issue with her shoulder for over 3 years, but it does not seem pt has gone to get this checked out.  educated pt and fiance on the importance of regular outpatient follow up. Pending PT/OT evaluation for further discharge planning.    Discharge Barriers Identified: None    Payor: [employer] MANAGED MEDICARE / Plan: [employer] MEDICARE HMO / Product Type: Capitation /     Extended Emergency Contact Information  Primary Emergency Contact: Gita Johnson  Address: [place] [address] [facility]  Mobile Phone: [phone]  Relation: Sister  Preferred language: English   needed? No  Secondary Emergency Contact: Tee Johnson  Address: [place] [address] [place]  Mobile Phone: [phone]  Relation: Brother  Preferred language: English   needed? No    Discharge Plan A: Home Health,Home with family  Discharge Plan B: Other (TBD)      [id number] - [facility] [address]  [address] & [facility]  [address] [facility] [address]  Phone: [phone]  Fax: [phone]    [facility] - [place], [place] - [address] [facility]  [address] [facility] [address]  Phone: [phone] Fax: [phone]      Initial Assessment (most recent)     Adult Discharge Assessment - 01/06/22 1109        Discharge Assessment    Assessment Type Discharge Planning Assessment     Confirmed/corrected address, phone number and insurance Yes     Source of Information patient;family;health record   nirali Marti    When was your last doctors appointment? --   uncertain    Communicated ALEX with patient/caregiver Date not available/Unable to determine     Reason For Admission acute CVA     Lives With sibling(s);significant other;grandchild(ana)     Facility Arrived From: home     Do you expect to return to your current living situation? Yes     Do you have help at home or someone to help you manage your care at home? Yes     Who are your caregiver(s) and their phone number(s)? Brother Tee Johnson, nirali Nish Marti     Prior to hospitilization cognitive status: Alert/Oriented     Current cognitive status: --   Pt oriented to place, unable to name month. Pt also reports she is turning 20 soon.    Walking or Climbing Stairs Difficulty none     Dressing/Bathing Difficulty none     Home Layout Able to live on 1st floor     Equipment Currently Used at Home bedside commode;walker, rolling;shower chair   Pt does not usually use any of this DME    Readmission within 30 days? No     Patient currently being followed by outpatient case management? No     Do you currently have service(s) that help you manage your care at home? No     Do you take prescription medications? No     Do you have prescription coverage? Yes     Coverage [employer] Medicare and Medicaid     Do you have any problems affording any of your prescribed medications? No     Is the patient taking medications as prescribed? --   Pt reports she does not have any medications prescribed at this time.    Who is  going to help you get home at discharge? Hodan Mock     How do you get to doctors appointments? family or friend will provide     Are you on dialysis? No     Do you take coumadin? No     Discharge Plan A Home Health;Home with family     Discharge Plan B Other   TBD    DME Needed Upon Discharge  other (see comments)   TBD    Discharge Plan discussed with: Patient;Spouse/sig other     Discharge Barriers Identified None

## 2022-01-06 NOTE — PT/OT/SLP EVAL
Speech Language Pathology Evaluation  Bedside Swallow    Patient Name:  Steff Johnson   MRN:  [id number]  Admitting Diagnosis: Acute CVA (cerebrovascular accident)    Recommendations:                 General Recommendations:    · Will continue to follow to assess acute changes related to speech, language, and swallow function   · Recommend SLP at next level of care     Diet recommendations:   (IDDSI 6), Thin     Aspiration Precautions:   1)  HOB elevated   2) Oral hygiene 2x/day   3) Frequent liquid wash and dry swallows to reduce/clear oral residue with solids    General Precautions: Standard,      History:       Chief Complaint   Patient presents with    Extremity Weakness       L sided weakness since 1000 yesterday          HPI: 61 year old patient getting admitted with acute CVA   Pt started having right-sided weakness and slurred speech and was having decreased responsiveness for over 24 hours.    Because patient did not improve over the next 24 hour ,family contacted EMS and patient was transported here.    Clinically and imaging wise pt had CVA and pt got admitted  Pt unable to provide further H/o      Past Medical History:   Diagnosis Date    Arthritis     Complete tear of left rotator cuff 11/9/2017    COPD (chronic obstructive pulmonary disease)     COVID-19 infection 7/2/2021    Diabetes mellitus     H/o Cerebrovascular accident (CVA) of left pontine structure 12/12/2019    Hypertension     Stroke     Wears glasses        Past Surgical History:   Procedure Laterality Date    COLONOSCOPY N/A 4/11/2017    Procedure: COLONOSCOPY;  Surgeon: Jared Antonio MD;  [facility]: [facility];  Service: Endoscopy;  Laterality: N/A;    HYSTERECTOMY      OOPHORECTOMY      SHOULDER SURGERY      R       Social History: Patient lives with spouse.    Prior Intubation HX:  None this admit    Modified Barium Swallow: none within EMR    MRI BRAIN:   IMPRESSION:     This is examination is of significantly limited  diagnostic quality, substantially degraded by patient motion despite repeated imaging. The examination should be repeated when clinically able.     Small area of presumed restricted diffusion within the upper regina suspicious for region of recent small vessel infarction.     Focal area of decreased signal within the right caudate head which corresponds to a punctate focus of increased density on recent CT. A small area of recent punctate hemorrhages not excluded. There are additional foci of hemosiderin staining observed within the leftward basal ganglia and medial right cerebellar hemisphere.     Changes compatible with chronic microvascular ischemia and mild involutional changes.    Chest X-Rays: not ordered/completed     Prior diet: promotes regular/thin.      Subjective     Pt alert, spouse at bedside  Spouse promotes no changes related to communication, swallow or speech. States dysarthria consistent with baseline   Patient goals: none stated      Pain/Comfort:  · Pain Rating 1: 0/10    Respiratory Status: Room air    Objective:     Cognitive Communication: Alert, attentive to clinician t/o encounter. Able to follow simple body part commands. Overall verbal attempts are decreased with decreased phrase length. Able to provide word to phrase level responses to answer basic questions and to meet wants and needs.     Motor speech: Imprecise with reduced volume. 100% intelligible.     Oral Musculature Evaluation  · Oral Musculature: gross asymmetry present (right sided facial droop)  · Dentition: teeth in poor condition,scattered dentition  · Secretion Management: adequate  · Mandibular Strength and Mobility: WFL  · Oral Labial Strength and Mobility: impaired retraction,impaired pursing  · Lingual Strength and Mobility:  (limited assessment as Pt unable to disassociate jaw and tongue; appears to deviate to right upon protrusion)  · Velar Elevation:  (asymmetrical elevation with uvula deviating to right)  · Buccal  Strength and Mobility: flaccid  · Volitional Cough: Able to produce however, weak with poor effort  · Volitional Swallow: Unable to produce when cued  · Voice Prior to PO Intake: clear in nature with reduced vocal intensity  · Oral Musculature Comments: Right sided facial droop at rest. Unable to complete sensory testing secondary to comprehension deficits/decreased understanding of task expectations    Bedside Swallow Eval:   Consistencies Assessed:  · Thin liquid: ice chip x2, teaspoon x3, 3oz via consecutive cup sips, self regulated cup and straw   · Solid: 100% of chuckie cracker     Oral Phase:   · Intact labial seal to cup edge and for spoon stripping   · Anterior spillage on right with cup edge  · Able to siphon straw, no spillage   · Adequate oral containment without anterior spillage across consistencies   · Prolonged mastication with visible rotary chew   · Difficulty transiting cohesive solid bolus, requiring multiple attempts with liquid wash     Pharyngeal Phase:   · No overt signs or symptoms of aspiration or airway threat  · Clear vocal quality maintained   · Completed uninterrupted drinking of 3 oz water with no overt s/s of aspiration (i.e., immediate coughing or choking), passing 3oz water challenge. Risk of aspiration not indicated although cannot be ruled out at bedside.     Compensatory Strategies  · Liquid wash to increase a-p transit and clear dry solid residue       Assessment:     Steff Johnson is a 61 y.o. female with an SLP diagnosis of Dysphagia, Dysarthria and Cognitive-Linguistic Impairment.      Goals:   Multidisciplinary Problems     SLP Goals        Problem: SLP Goal    Goal Priority Disciplines Outcome   SLP Goal     SLP Ongoing, Progressing   Description: 1. Assessment of cognitive communication  2. Consume soft solids and thin liquids w/out overt sign of aspiration or airway threat  3. Reduce oral residue through dry swallows and liquid wash given MIN A                   Plan:      · Patient to be seen:  3 x/week   · Plan of Care expires:     · Plan of Care reviewed with:  patient,spouse   · SLP Follow-Up:  Yes       Discharge recommendations:  home health speech therapy     Time Tracking:     SLP Treatment Date:   01/06/22  Speech Start Time:  1130  Speech Stop Time:  1143     Speech Total Time (min):  13 min    Billable Minutes: Eval Swallow and Oral Function 13    01/06/2022

## 2022-01-06 NOTE — PT/OT/SLP EVAL
"Occupational Therapy   Evaluation    Name: Steff Johnson  MRN: [id number]  Admitting Diagnosis:  Acute CVA (cerebrovascular accident)  Recent Surgery: * No surgery found *      Recommendations:     Discharge Recommendations: home health OT  Discharge Equipment Recommendations:  none  Barriers to discharge:  None    Assessment:     Steff Johnson is a 61 y.o. female with a medical diagnosis of Acute CVA (cerebrovascular accident). Performance deficits affecting function: weakness,impaired endurance,impaired self care skills,impaired functional mobilty,gait instability,impaired balance,impaired cognition,decreased upper extremity function,decreased safety awareness,impaired cardiopulmonary response to activity.        Rehab Prognosis: Fair; patient would benefit from acute skilled OT services to address these deficits and reach maximum level of function.       Plan:     Patient to be seen 5 x/week to address the above listed problems via self-care/home management,therapeutic activities,therapeutic exercises  · Plan of Care Expires: 02/06/22  · Plan of Care Reviewed with: patient    Subjective     Chief Complaint: "difficulty with R side"  Patient/Family Comments/goals: return home    Occupational Profile:  Living Environment: PT lives in a 1 story home with granddaughter and  with steps to enter.   Previous level of function: independent to mod ind with ADLs.  Roles and Routines: Caretaker, spouse  Equipment Used at Home:  walker, rolling  Assistance upon Discharge:  able to assist pt    Pain/Comfort:  · Pain Rating 1: 0/10  · Pain Rating Post-Intervention 1: 0/10    Patients cultural, spiritual, Quaker conflicts given the current situation: no    Objective:     Communicated with: nurse prior to session.  Patient found HOB elevated with peripheral IV,telemetry upon OT entry to room.    General Precautions: Standard, fall   Orthopedic Precautions:N/A   Braces: N/A  Respiratory Status: Room " air    Occupational Performance:    Bed Mobility:    · Patient completed Rolling/Turning to Left with  stand by assistance  · Patient completed Rolling/Turning to Right with stand by assistance  · Patient completed Scooting/Bridging with stand by assistance  · Patient completed Supine to Sit with stand by assistance  · Patient completed Sit to Supine with stand by assistance    Functional Mobility/Transfers:  · Patient completed Sit <> Stand Transfer with contact guard assistance  with  rolling walker   · Functional Mobility: Pt able to take small steps forward/back and side to side without loss of balance.     Activities of Daily Living:  · Feeding:  modified independence extra time  · Grooming: modified independence extra time  · Bathing: moderate assistance    · Upper Body Dressing: minimum assistance    · Lower Body Dressing: minimum assistance    · Toileting: moderate assistance      Cognitive/Visual Perceptual:  Cognitive/Psychosocial Skills:     -       Oriented to: Person, Place and Situation   -       Follows Commands/attention:Follows two-step commands  -       Communication: expressive aphasia  -       Memory: Poor immediate recall  -       Safety awareness/insight to disability: impaired   -       Mood/Affect/Coping skills/emotional control: Appropriate to situation, Cooperative and Pleasant    Physical Exam:  Balance:    -       Pt able to take small steps forward/back and side to side without loss of balance.   Dominant hand:    -       right  Upper Extremity Range of Motion:     -       Right Upper Extremity: WFL  -       Left Upper Extremity: WNL  Upper Extremity Strength:    -       Right Upper Extremity: Deficits: 3/5  -       Left Upper Extremity: Deficits: 4-/5   Strength:    -       Right Upper Extremity: poor  -       Left Upper Extremity: poor    AMPAC 6 Click ADL:  AMPAC Total Score: 16    Treatment & Education:  Role of OT, call bell use, safety awareness, importance of OOB.  Pt  completed bed mobility, sit<>stand, grooming while sitting EOB.  Education:    Patient left HOB elevated with all lines intact, call button in reach and bed alarm on    GOALS:   Multidisciplinary Problems     Occupational Therapy Goals        Problem: Occupational Therapy Goal    Goal Priority Disciplines Outcome Interventions   Occupational Therapy Goal     OT, PT/OT Ongoing, Progressing    Description: Goals to be met by: discharge     Patient will increase functional independence with ADLs by performing:    UE Dressing with Stand-by Assistance.  LE Dressing with Stand-by Assistance.  Grooming while standing at sink with Stand-by Assistance.  Toileting from toilet with Stand-by Assistance for hygiene and clothing management.   Toilet transfer to toilet with Stand-by Assistance.                     History:     Past Medical History:   Diagnosis Date    Arthritis     Complete tear of left rotator cuff 11/9/2017    COPD (chronic obstructive pulmonary disease)     COVID-19 infection 7/2/2021    Diabetes mellitus     H/o Cerebrovascular accident (CVA) of left pontine structure 12/12/2019    Hypertension     Stroke     Wears glasses        Past Surgical History:   Procedure Laterality Date    COLONOSCOPY N/A 4/11/2017    Procedure: COLONOSCOPY;  Surgeon: Jared Antonio MD;  Location: [facility];  Service: Endoscopy;  Laterality: N/A;    HYSTERECTOMY      OOPHORECTOMY      SHOULDER SURGERY      R       Time Tracking:     OT Date of Treatment: 01/06/22  OT Start Time: 0949  OT Stop Time: 1010  OT Total Time (min): 21 min    Billable Minutes:Evaluation 10  Self Care/Home Management 11    1/6/2022

## 2022-01-06 NOTE — CONSULTS
[facility]  Department of Neurology  Neurology Consultation Note        PATIENT NAME: Steff Johnson  MRN: [id number]  CSN: [id number]      TODAY'S DATE: 01/05/2022  ADMIT DATE: 1/5/2022                            CONSULTING PROVIDER: Geoffrey Roque MD  CONSULT REQUESTED BY: Roger Berry MD      Reason for consult: CVA       History obtained from chart review and the patient.    HPI: Steff Johnson is a 61 y.o. female with a history of HTN, stroke, presented with right-sided weakness and slurred speech and was having decreased responsiveness for over 24 hours.     Family has not seen any improvement in one day and they brought her to the ER. In the ER, she had R side weakness, dysarthria. CTH showed R BG hyper density.     On my exam, she was drowsy but wakes up to stimulus and was oriented x3. She had R Facial droop, mild R side weakness, dysarthria. CTA showed no LVO.     Tele stroke eval was done and she was not a candidate for tPA or KUMAR.          PREVIOUS MEDICAL HISTORY:  Past Medical History:   Diagnosis Date    Arthritis     Complete tear of left rotator cuff 11/9/2017    COPD (chronic obstructive pulmonary disease)     COVID-19 infection 7/2/2021    Diabetes mellitus     H/o Cerebrovascular accident (CVA) of left pontine structure 12/12/2019    Hypertension     Stroke     Wears glasses      PREVIOUS SURGICAL HISTORY:  Past Surgical History:   Procedure Laterality Date    COLONOSCOPY N/A 4/11/2017    Procedure: COLONOSCOPY;  Surgeon: Jared Antonio MD;  Location: [facility];  Service: Endoscopy;  Laterality: N/A;    HYSTERECTOMY      OOPHORECTOMY      SHOULDER SURGERY      R     FAMILY MEDICAL HISTORY:  Family History   Problem Relation Age of Onset    Diabetes Mother     Asthma Mother     Hypertension Mother     Diabetes Father     Diabetes Brother     Hypertension Brother     Anemia Daughter     Glaucoma Neg Hx     Macular degeneration Neg Hx     Retinal detachment  Neg Hx      SOCIAL HISTORY:  Social History     Tobacco Use    Smoking status: Never Smoker    Smokeless tobacco: Never Used   Substance Use Topics    Alcohol use: No    Drug use: No     ALLERGIES:  Review of patient's allergies indicates:  No Known Allergies  HOME MEDICATIONS:  Prior to Admission medications    Medication Sig Start Date End Date Taking? Authorizing Provider   aspirin 81 MG Chew Take 1 tablet (81 mg total) by mouth once daily. 6/2/21 6/2/22  Cassidy Shaw DNP, NP   atorvastatin (LIPITOR) 40 MG tablet Take 1 tablet (40 mg total) by mouth once daily. 10/6/21 10/6/22  Cristina Ren MD   blood glucose control, low Soln Use as directed 10/3/21   Cristina Ren MD   blood-glucose meter (TRUE METRIX GLUCOSE METER) kit Use as instructed 10/20/21 10/19/22  Gabriel Moran NP   chlorthalidone (HYGROTEN) 50 MG Tab Take 1 tablet (50 mg total) by mouth once daily. 10/6/21 10/6/22  Cristina Ren MD   cloNIDine 0.1 mg/24 hr td ptwk (CATAPRES) 0.1 mg/24 hr Place 1 patch onto the skin every 7 days. 12/1/21 12/1/22  Messi Rice MD   ergocalciferol (ERGOCALCIFEROL) 50,000 unit Cap TK 1 C PO Q 7 DAYS  Patient taking differently: Take 50,000 Units by mouth every 7 days. TK 1 C PO Q 7 DAYS  WEDNESDAYS 2/11/21   Cristina Ren MD   hydrALAZINE (APRESOLINE) 100 MG tablet Take 1 tablet (100 mg total) by mouth 3 (three) times daily. 10/3/21 10/3/22  Cristina Ren MD   HYDROcodone-acetaminophen (NORCO)  mg per tablet Take 1 tablet by mouth 2 (two) times daily as needed. 11/5/21   Historical Provider   insulin aspart U-100 (NOVOLOG FLEXPEN U-100 INSULIN) 100 unit/mL (3 mL) InPn pen Inject 12 Units into the skin 3 (three) times daily before meals. 12 u AC + correction MaxTDD 60u 10/6/21   Cristina Ren MD   insulin degludec (TRESIBA FLEXTOUCH U-100) 100 unit/mL (3 mL) insulin pen Inject 40 Units into the skin once daily. 11/24/21 11/24/22  Messi Rice MD   lancets [facility] To check BG 2  "times daily, to use with insurance preferred meter 1/2/19   Cassidy Shaw DNP, NP   metoprolol succinate (TOPROL-XL) 100 MG 24 hr tablet Take 1 tablet (100 mg total) by mouth once daily. 10/3/21 1/1/22  Cristina Ren MD   pen needle, diabetic (BD MI 2ND GEN PEN NEEDLE) 32 gauge x 5/32" Ndle Uses 4 daily 6/2/21   Cassidy Shaw DNP NP   pen needle, diabetic (BD MI 2ND GEN PEN NEEDLE) 32 gauge x 5/32" Ndle Inject daily 10/6/21   Cristina Ren MD   umeclidinium (INCRUSE ELLIPTA) 62.5 mcg/actuation inhalation capsule Inhale 1 capsule (63 mcg total) into the lungs once daily. Controller 2/11/21   Cristina Ren MD     CURRENT SCHEDULED MEDICATIONS:   aspirin  81 mg Oral Daily    atorvastatin  40 mg Per NG tube Daily    cloNIDine 0.1 mg/24 hr td ptwk  1 patch Transdermal Q7 Days     CURRENT INFUSIONS:    CURRENT PRN MEDICATIONS:  calcium chloride IVPB, calcium chloride IVPB, calcium chloride IVPB, dextrose 50%, glucagon (human recombinant), HYDROmorphone, insulin aspart U-100, labetalol, magnesium oxide, magnesium sulfate IVPB, magnesium sulfate IVPB, magnesium sulfate IVPB, magnesium sulfate IVPB, ondansetron, potassium chloride in water, potassium chloride in water, potassium chloride in water, potassium chloride in water, potassium chloride, potassium chloride, potassium chloride, potassium chloride, sodium chloride 0.9%    REVIEW OF SYSTEMS:  Please refer to the HPI for all pertinent positive and negative findings. A comprehensive review of all other systems was negative.       PHYSICAL EXAM:  Patient Vitals for the past 24 hrs:   BP Temp Temp src Pulse Resp SpO2 Height Weight   01/05/22 1951 136/65 99 °F (37.2 °C) Oral 88 18 95 % -- 47.3 kg (104 lb 4.4 oz)   01/05/22 1854 -- -- -- -- 15 -- -- --   01/05/22 1830 (!) 180/79 -- -- -- -- -- -- --   01/05/22 1810 (!) 161/67 -- -- 106 -- 98 % -- --   01/05/22 1720 (!) 154/68 -- -- 102 -- 99 % -- --   01/05/22 1718 (!) 154/68 -- -- 101 (!) 21 99 % -- -- " "  01/05/22 1715 (!) 164/83 -- -- 101 -- 99 % -- --   01/05/22 1700 (!) 165/71 -- -- 100 -- 99 % -- --   01/05/22 1630 (!) 165/69 -- -- 102 -- 98 % -- --   01/05/22 1600 (!) 172/74 -- -- 100 (!) 22 99 % -- --   01/05/22 1530 (!) 172/74 -- -- 99 20 98 % -- --   01/05/22 1510 (!) 160/72 -- -- -- -- -- -- --   01/05/22 1500 (!) 160/72 98.3 °F (36.8 °C) Oral 98 -- 99 % -- --   01/05/22 1430 (!) 175/74 -- -- 91 -- 99 % -- --   01/05/22 1400 (!) 161/100 -- -- 97 -- 96 % -- --   01/05/22 1330 (!) 163/84 -- -- 92 -- 97 % -- --   01/05/22 1300 (!) 147/67 -- -- 93 (!) 28 97 % -- --   01/05/22 1240 (!) 142/68 -- -- 100 -- (!) 93 % -- --   01/05/22 1100 (!) 144/66 -- -- -- -- -- -- --   01/05/22 1030 (!) 129/57 98.5 °F (36.9 °C) Oral 80 18 99 % 4' 11" (1.499 m) 49.9 kg (110 lb)       GENERAL APPEARANCE: Drowsy, well-developed, well-nourished female in no acute distress.  HEENT: Normocephalic and atraumatic. PERRL. Oropharynx unremarkable.  PULM: Normal respiratory effort. No accessory muscle use.  CV: RRR.  ABDOMEN: Soft, nontender.  EXTREMITIES: No obvious signs of vascular compromise. Pulses present. No cyanosis, clubbing or edema.  SKIN: Clear; no rashes, lesions or skin breaks in exposed areas.    NEURO:  MENTAL STATUS: Patient is drowsy, wakes up to stimulus and is oriented x3.  Has severe dysarthria..  Affect euthymic.    CRANIAL NERVES:  CN I: Not tested.  CN II: Fundoscopic exam deferred.  CN III, IV, VI: Pupils equal, round and reactive to light.  Extraocular movements full and intact.  CN V: Facial sensation normal.  CN VII: Facial asymmetry noted for right facial droop.  CN VIII: Hearing grossly normal and equal bilaterally.  No skew deviation or pathologic nystagmus.  CN IX, X: Palate elevates symmetrically. Speech/articulation is dysarthric.  CN XI: Shoulder shrug and chin rotation equal with good strength.  CN XII: Tongue protrusion midline.    MOTOR:  Bulk normal. Tone normal and symmetric throughout.  Abnormal " movements absent.  Tremor: none present.  Strength Right upper extremity 2/5, right lower extremity 4/5.  Left upper lower extremity.    REFLEXES:  DTRs 2+ throughout.  Plantar response equivocal bilaterally.  SENSATION: grossly intact throughout.  COORDINATION: normal finger-to-nose.  STATION: not tested.  GAIT: not tested.      NIHSS:  1a      Level of Consciousness (alert, drowsy, etc.):   1=not alert but arousable by minor stimulation to obey, answer or respond  1b.     Level of Consciousness Questions (month, age): 0= able to answer both questions  1c.     Level of Consciousness Commands (open, close eyes, make fist, let go):  0=Answers both tasks correctly  2.      Best Gaze (eyes open - patient follows examiner's finger or face):      0=normal  3.      Visual (introduce visual stimulus/threat to patient's visual field quadrants):  0=No visual loss  4.      Facial Palsy (show teeth, raise eyebrows and squeeze eyes shut):        0=Normal symmetric movement  5a.     Motor Arm - Left (elevate extremity 90 degrees and score drift/movement):       0=No drift, limb holds 90 (or 45) degrees for full 10 seconds  5b.     Motor Arm - Right (elevate extremity 90 degrees and score drift/movement):      3=No effort against gravity, limb falls  6a.     Motor Leg - Left (elevate extremity 30 degrees and score drift/movement):       0=No drift, limb holds 90 (or 45) degrees for full 10 seconds  6b      Motor Leg - Right (elevate extremity 30 degrees and score drift/movement):      1=Drift, limb holds 90 (or 45) degrees but drifts down before full 10 seconds: does not hit bed immune there  7.      Limb Ataxia (finger-nose, heel down shin):      0=Absent  8.      Sensory (pin prick to face, arm, trunk, and leg - compare side to side):        0=Normal; no sensory loss  9.      Best Language (name items, describe a picture and read sentences):      0=No aphasia, normal  10.     Dysarthria (evaluate speech clarity by patient  repeating listed words): 2=Severe; patient speech is so slurred as to be unintelligible in the absence of or our of proportion to any dysphagia, or is mute/anarthric  11.     Extinction and Inattention (use prior testing to identify neglect or double simultaneous stimuli testing):      0=No abnormality          NIH Stroke Scale Total:         7      Labs:  Recent Labs   Lab 01/05/22  1034   *   K 4.0   CL 98   CO2 21*   BUN 33*   CREATININE 1.9*   *   CALCIUM 9.6     Recent Labs   Lab 01/05/22  1034   WBC 12.33   HGB 12.7   HCT 35.9*   *     Recent Labs   Lab 01/05/22  1034   ALBUMIN 4.0   PROT 8.2   BILITOT 1.4*   ALKPHOS 73   ALT 25   AST 28     Lab Results   Component Value Date    INR 1.2 01/05/2022     Lab Results   Component Value Date    TRIG 64 01/05/2022    HDL 93 (H) 01/05/2022    CHOLHDL 45.1 01/05/2022     Lab Results   Component Value Date    HGBA1C 14.8 (H) 11/24/2021     No results found for: PROTEINCSF, GLUCCSF, WBCCSF    Imaging:  I have reviewed and interpreted the pertinent imaging and lab results.      CTA Head and Neck (xpd)  CMS MANDATED QUALITY DATA - CT RADIATION - 436    All CT scans at this facility utilize dose modulation, iterative reconstruction, and/or weight based dosing when appropriate to reduce radiation dose to as low as reasonably achievable.    CMS MANDATED QUALITY DATA - CAROTID - 195    All measurements and percent stenosis described below were determined using NASCET criteria or criteria similar to NASCET, as defined by the Society of Radiologists in Ultrasound Consensus Conference, Radiology, 2003    Reason: Stroke/TIA, determine embolic source    Technique: CT angiography of brain and neck with 100 mL Omnipaque 350.  Maximum intensity projection coronal reformations were obtained at a separate workstation and stored in the patient's permanent medical record.    COMPARISON: None    CTA BRAIN:  VASCULAR FINDINGS:  Major intracranial arteries are patent with  no intraluminal filling defect, stenosis, or dissection. Negative for aneurysm or evidence of vasculitis.    Visualized opacified dural venous sinuses are unremarkable.    NONVASCULAR FINDINGS:  No abnormal intra-axial or extra-axial enhancement. Sinuses and mastoids are clear. Lucency involving left anterior mandible is geographic measuring 9 mm, nonspecific.    CTA NECK:  VASCULAR FINDINGS:  Three branch vessel aortic arch anatomy is present.    Left common carotid artery is patent. Minimal calcified plaque affects origin of left internal carotid artery without narrowing. Tortuous cervical left ICA is otherwise widely patent. Left ECA and branches are patent. Left vertebral artery is patent.    Right common carotid, internal carotid, external carotid, and vertebral arteries are widely patent.    Retropharyngeal course of distal common carotid and proximal internal carotid arteries occur bilaterally. Cervical ICAs are tortuous bilaterally.    NONVASCULAR FINDINGS:  Cervical soft tissues are unremarkable. Moderate degenerative spondylosis occurs at C5-C6. Visualized lung apices are clear.    IMPRESSION:    1. Focal minimal calcified plaque at origin of left cervical ICA, without narrowing.  2. Otherwise negative CTA brain and neck.    Electronically signed by:  Donald Humphrey MD  1/5/2022 1:13 PM CST Workstation: [id number]  MRI Brain Without Contrast  MRI of the brain without contrast    HISTORY: Cerebrovascular accident.    This examination is severely degraded by patient motion despite repeated imaging. Repeat MRI is necessary when clinically able.    There is a suspicious area of longitudinally oriented restricted diffusion within the upper regina at the level of the superior cerebellar peduncles, potentially reflecting recent infarction. No additional areas of restricted diffusion identified.    There are apparent areas of remote small lacunar infarction within the left lentiform nucleus and bilateral thalami.  There is an apparent small focus of decreased signal which corresponds with area of increased density on recent CT near the right caudate head. This could represent a small punctate focus of recent hemorrhage. Hemosiderin staining is observed within the left basal ganglia and medially within the right cerebellar hemisphere, likely related to remote microhemorrhage.    There are diffuse changes of white matter hyperintensity within the subcortical and periventricular regions of both hemispheres and within the regina, likely a reflection of chronic microvascular ischemia.    There is mild diffuse prominence of the ventricular system and sulci compatible with involutional changes. There are no extra-axial collections.    Appropriate flow voids are demonstrated in the major blood vessels.    IMPRESSION:    This is examination is of significantly limited diagnostic quality, substantially degraded by patient motion despite repeated imaging. The examination should be repeated when clinically able.    Small area of presumed restricted diffusion within the upper regina suspicious for region of recent small vessel infarction.    Focal area of decreased signal within the right caudate head which corresponds to a punctate focus of increased density on recent CT. A small area of recent punctate hemorrhages not excluded. There are additional foci of hemosiderin staining observed within the leftward basal ganglia and medial right cerebellar hemisphere.    Changes compatible with chronic microvascular ischemia and mild involutional changes.    Electronically signed by:  Sil Tomas MD  1/5/2022 1:02 PM [facility] Workstation: [id number]  MRA Brain without contrast  MRA OF THE BRAIN WITHOUT CONTRAST    CMS MANDATED QUALITY DATA - CAROTID - 195    All measurements and percent stenosis described below were determined using NASCET criteria or criteria similar to NASCET, as defined by the Society of Radiologists in Ultrasound Consensus  Conference, Radiology, 2003    CLINICAL HISTORY:  61 years (1960) Female Neuro deficit, acute, stroke suspected ER room 2. Extremity weakness. Pt very confused/altered. Unable to follow instructions.; Multiple straps, pads, pillows used to try & keep pt still for scans.; CTA head canceled due to 1.8 creatine. MRA repeated due to pt motion    TECHNIQUE:  MR BRAIN ANGIOGRAPHY WITHOUT IV CONTRAST. 358 images obtained. Noncontrasted, time of flight images were obtained with MIP and 3-D reconstructions at a separate workstation to evaluate the intracranial arterial circulation.    COMPARISON:  None available.    FINDINGS:  Markedly limited, near nondiagnostic MRA of the brain secondary to motion artifact throughout the exam.    That being said there appears to be some degree of patency of the intracranial internal carotid arteries bilaterally, and symmetric opacification of the [place], MCA and ECA arteries. The posterior communicating arteries are likely present. The vertebral arteries are poorly seen, and the basal artery is present.    IMPRESSION:  Markedly limited, essentially nondiagnostic MRA of the brain, but with at least some flow identified in the intracranial circulation.    .    Electronically signed by:  Serg Brambila MD  1/5/2022 12:23 PM [facility] Workstation: [id number]  CT Head Without Contrast  CMS MANDATED QUALITY DATA - CT RADIATION 436    All CT scans at this facility utilize dose modulation, iterative reconstruction, and/or weight based dosing when appropriate to reduce radiation dose to as low as reasonably achievable.    CLINICAL HISTORY:  61 years (1960) Female Neuro deficit, acute, stroke suspected    TECHNIQUE:  CT HEAD WITHOUT IV CONTRAST. 100 images obtained. Axial CT of the brain without contrast using soft tissue and bone algorithm. Please note in the acute setting if there is a clinical concern for an acute stroke MRI would be more sensitive/specific for evaluation of  ischemia.    COMPARISON:  Most recent CT from 11/23/2021    FINDINGS:  Rounded hyperattenuating focus in the right caudate head measuring 3-4 mm (image 25), new from the previous exam and suspicious for a punctate petechial bleed.    Mild diffuse cerebral atrophy for age with moderate periventricular deep cerebral white matter low attenuation, a nonspecific finding in this age group which can be seen in any diffuse white matter process but which is most commonly associated with chronic microvascular ischemic disease. There are scattered atheromatous calcifications in the intracranial internal carotid arteries.    There are small rounded defects in the left basal ganglia (image 23) suggesting prior lacunar infarcts, unchanged from the previous exam.    There is left lateral gaze deviation, consider correlation with physical exam findings. External auditory canals are unremarkable. The visualized paranasal sinuses and mastoid air cells are essentially clear.    IMPRESSION:  1. New punctate hyperattenuating focus in the right basal ganglia suspicious for a small petechial bleed.  2. No hydrocephalus, herniation or midline shift.  3. Additional chronic/involutional findings as noted above.    RESULT NOTIFICATION: These observations were discussed by the dictating physician, by phone and acknowledged by the ordering provider SUSANNA KIRK at 1/5/2022 11:09 AM CST    .    Electronically signed by:  Serg Brambila MD  1/5/2022 11:15 AM CST Workstation: [id number]         ASSESSMENT & PLAN:    Acute ischemic pontine stroke  Acute intracerebral hemorrhage  Acute lacunar infarct in the regina an acute hemorrhage in the caudate likely secondary to small-vessel disease from hypertension.    Workup  · CTH:  Motion degraded study.  Hypodensity in the right basal ganglia likely small acute hemorrhage  · CTA head and neck:  No large vessel occlusion/  · MRI brain:  Motion degraded study.  Acute infarcts in the regina.  Likely small  acute hemorrhage in the right caudate.  · ECHO:  pending   · LDL: pending  · HbA1c: pending       Plan  · Admitted for further stroke workup  · Start with Lipitor 80mg.  Will hold aspirin for now and repeat CT head tomorrow to look for stability of the intracranial hemorrhage.  If no further worsening of the hemorrhage, start aspirin 81 mg.  · Keep BP between 140 and 160 systolic  · PT OT  · Speech therapy  · DVT prophylaxis with chemo/SCD prophylaxis  · Extensively discussed lifestyle modifications as prophylactic measures for stroke prevention including smoking cessation, adequate blood pressure management, healthy diet and regular exercise.              Thank you kindly for including us in the care of this patient. Please do not hesitate to contact us with any questions.      Critical Care:  56 minutes of critical care time has been spent evaluating with the patient. Time includes chart review not limited to diagnostic imaging, labs, and vitals, patient assessment, discussion with family and nursing, current order evaluations, and new order entries.       Geoffrey Roque MD  Neurology/vascular Neurology  Date of Service: 01/05/2022  9:52 PM    --------------------------------------------------------------------------------------------------------------------------------------------------------------------------------------------------------------------------------------------------------------  Please note: This note was transcribed using voice recognition software. Because of this technology there are often uinintended grammatical, spelling, and other transcription errors. Please disregard these errors.\

## 2022-01-06 NOTE — PLAN OF CARE
01/06/22 1603   Discharge Reassessment   Assessment Type Discharge Planning Reassessment   Discharge Plan discussed with: Patient   Discharge Plan A Home with family;Home Health   Discharge Plan B Home Health;Home with family   DME Needed Upon Discharge  none   Post-Acute Status   Post-Acute Authorization Home Health   Home Health Status   (list left at bedside)    attempted to discuss home health with pt, and found pt to be naked saying she was trying to use the bathroom.  alerted RN and returned later when pt was dressed and back in bed. PT/OT recommending home health. Pt states she has not had home health before, and that her granddaughter will be with her during the day while she is at home.  explained the benefit and purpose of home health, and asked what family member pt would like to have involved in her discharge plan. Pt states she has a daughter who is in [place] right now but will be coming back soon, with whom home health can be discussed. List was left at bedside for pt to review with family. Case management to follow up.

## 2022-01-07 VITALS
TEMPERATURE: 98 F | HEIGHT: 59 IN | BODY MASS INDEX: 21.02 KG/M2 | OXYGEN SATURATION: 98 % | SYSTOLIC BLOOD PRESSURE: 159 MMHG | WEIGHT: 104.25 LBS | DIASTOLIC BLOOD PRESSURE: 76 MMHG | HEART RATE: 78 BPM | RESPIRATION RATE: 16 BRPM

## 2022-01-07 PROBLEM — F11.20 CONTINUOUS OPIOID DEPENDENCE: Status: RESOLVED | Noted: 2019-03-26 | Resolved: 2022-01-07

## 2022-01-07 PROBLEM — I63.9 ACUTE CVA (CEREBROVASCULAR ACCIDENT): Status: RESOLVED | Noted: 2022-01-05 | Resolved: 2022-01-07

## 2022-01-07 PROBLEM — N17.9 AKI (ACUTE KIDNEY INJURY): Status: RESOLVED | Noted: 2022-01-05 | Resolved: 2022-01-07

## 2022-01-07 LAB
ALBUMIN SERPL BCP-MCNC: 3.5 G/DL (ref 3.5–5.2)
ALP SERPL-CCNC: 64 U/L (ref 55–135)
ALT SERPL W/O P-5'-P-CCNC: 20 U/L (ref 10–44)
ANION GAP SERPL CALC-SCNC: 12 MMOL/L (ref 8–16)
AST SERPL-CCNC: 24 U/L (ref 10–40)
BASOPHILS # BLD AUTO: 0.06 K/UL (ref 0–0.2)
BASOPHILS NFR BLD: 0.6 % (ref 0–1.9)
BILIRUB SERPL-MCNC: 1.3 MG/DL (ref 0.1–1)
BUN SERPL-MCNC: 35 MG/DL (ref 8–23)
CALCIUM SERPL-MCNC: 9.2 MG/DL (ref 8.7–10.5)
CHLORIDE SERPL-SCNC: 102 MMOL/L (ref 95–110)
CO2 SERPL-SCNC: 24 MMOL/L (ref 23–29)
CREAT SERPL-MCNC: 1.3 MG/DL (ref 0.5–1.4)
DIFFERENTIAL METHOD: ABNORMAL
EOSINOPHIL # BLD AUTO: 0.1 K/UL (ref 0–0.5)
EOSINOPHIL NFR BLD: 1 % (ref 0–8)
ERYTHROCYTE [DISTWIDTH] IN BLOOD BY AUTOMATED COUNT: 15.3 % (ref 11.5–14.5)
EST. GFR  (AFRICAN AMERICAN): 50.5 ML/MIN/1.73 M^2
EST. GFR  (NON AFRICAN AMERICAN): 43.8 ML/MIN/1.73 M^2
GLUCOSE SERPL-MCNC: 171 MG/DL (ref 70–110)
GLUCOSE SERPL-MCNC: 191 MG/DL (ref 70–110)
GLUCOSE SERPL-MCNC: 200 MG/DL (ref 70–110)
GLUCOSE SERPL-MCNC: 326 MG/DL (ref 70–110)
HCT VFR BLD AUTO: 35 % (ref 37–48.5)
HGB BLD-MCNC: 12.3 G/DL (ref 12–16)
IMM GRANULOCYTES # BLD AUTO: 0.05 K/UL (ref 0–0.04)
IMM GRANULOCYTES NFR BLD AUTO: 0.5 % (ref 0–0.5)
LYMPHOCYTES # BLD AUTO: 3.5 K/UL (ref 1–4.8)
LYMPHOCYTES NFR BLD: 32.6 % (ref 18–48)
MCH RBC QN AUTO: 27.2 PG (ref 27–31)
MCHC RBC AUTO-ENTMCNC: 35.1 G/DL (ref 32–36)
MCV RBC AUTO: 77 FL (ref 82–98)
MONOCYTES # BLD AUTO: 0.9 K/UL (ref 0.3–1)
MONOCYTES NFR BLD: 8.2 % (ref 4–15)
NEUTROPHILS # BLD AUTO: 6.1 K/UL (ref 1.8–7.7)
NEUTROPHILS NFR BLD: 57.1 % (ref 38–73)
NRBC BLD-RTO: 0 /100 WBC
PLATELET # BLD AUTO: 402 K/UL (ref 150–450)
PMV BLD AUTO: 11.2 FL (ref 9.2–12.9)
POTASSIUM SERPL-SCNC: 3.2 MMOL/L (ref 3.5–5.1)
PROT SERPL-MCNC: 6.9 G/DL (ref 6–8.4)
RBC # BLD AUTO: 4.52 M/UL (ref 4–5.4)
SODIUM SERPL-SCNC: 138 MMOL/L (ref 136–145)
WBC # BLD AUTO: 10.7 K/UL (ref 3.9–12.7)

## 2022-01-07 PROCEDURE — 25000003 PHARM REV CODE 250: Performed by: INTERNAL MEDICINE

## 2022-01-07 PROCEDURE — 80053 COMPREHEN METABOLIC PANEL: CPT | Performed by: INTERNAL MEDICINE

## 2022-01-07 PROCEDURE — 97535 SELF CARE MNGMENT TRAINING: CPT

## 2022-01-07 PROCEDURE — 97530 THERAPEUTIC ACTIVITIES: CPT | Mod: CQ

## 2022-01-07 PROCEDURE — 36415 COLL VENOUS BLD VENIPUNCTURE: CPT | Performed by: INTERNAL MEDICINE

## 2022-01-07 PROCEDURE — 97116 GAIT TRAINING THERAPY: CPT | Mod: CQ

## 2022-01-07 PROCEDURE — 25000003 PHARM REV CODE 250: Performed by: STUDENT IN AN ORGANIZED HEALTH CARE EDUCATION/TRAINING PROGRAM

## 2022-01-07 PROCEDURE — 85025 COMPLETE CBC W/AUTO DIFF WBC: CPT | Performed by: INTERNAL MEDICINE

## 2022-01-07 RX ADMIN — INSULIN ASPART 8 UNITS: 100 INJECTION, SOLUTION INTRAVENOUS; SUBCUTANEOUS at 12:01

## 2022-01-07 RX ADMIN — ATORVASTATIN CALCIUM 80 MG: 40 TABLET, FILM COATED ORAL at 11:01

## 2022-01-07 RX ADMIN — ASPIRIN 81 MG 81 MG: 81 TABLET ORAL at 11:01

## 2022-01-07 NOTE — CARE UPDATE
Care plan reviewed with patient, AAOx3, family at bedside, no respiratory distress noted, on room air. NSR per cardiac monitor, no red alarm noted. Denies any pain of discomfort, education provided on medication effect and side effect, including insulin administration for hyperglycemia, voices understanding. Call light within her reach, no apparent distress noted, bed in low position, bed alarm on, educated on the importance of calling as needed, voices understanding, stable at this time.    Awaiting on spouse (transport) for discharge.

## 2022-01-07 NOTE — PLAN OF CARE
Important Message from Medicare was sign, explained and given to patient/caregiver on 01/07/2022 at 9:40am     addressed any questions or concerns.    Important Message from Medicare document will be scanned into patient's medical record

## 2022-01-07 NOTE — PLAN OF CARE
01/07/22 1310   Final Note   Assessment Type Final Discharge Note   Anticipated Discharge Disposition Home-Health   What phone number can be called within the next 1-3 days to see how you are doing after discharge? [phone]   Hospital Resources/Appts/Education Provided Post-Acute resouces added to AVS   Post-Acute Status   Post-Acute Authorization Home Health   Home Health Status Set-up Complete/Auth obtained  ([facility])   Discharge Delays (!) Personal Transportation       Kaylee with [facility] [phone] reports that patient's SOC visit will be 1/8/22.

## 2022-01-07 NOTE — PT/OT/SLP PROGRESS
Occupational Therapy   Treatment    Name: Steff Johnson  MRN: [id number]  Admitting Diagnosis:  Acute CVA (cerebrovascular accident)       Recommendations:     Discharge Recommendations: home health OT  Discharge Equipment Recommendations:  none  Barriers to discharge:  None    Assessment:     Steff Johnson is a 61 y.o. female with a medical diagnosis of Acute CVA (cerebrovascular accident). Performance deficits affecting function are weakness,impaired endurance,impaired self care skills,impaired functional mobilty,gait instability,impaired balance,decreased safety awareness,impaired cardiopulmonary response to activity.     Rehab Prognosis:  Good; patient would benefit from acute skilled OT services to address these deficits and reach maximum level of function.       Plan:     Patient to be seen 5 x/week to address the above listed problems via self-care/home management,therapeutic activities,therapeutic exercises  · Plan of Care Expires: 02/06/22  · Plan of Care Reviewed with: patient    Subjective     Pain/Comfort:  · Pain Rating 1: 0/10  · Pain Rating Post-Intervention 1: 0/10    Objective:     Communicated with: nurse prior to session.  Patient found up in chair with peripheral IV,telemetry upon OT entry to room.    General Precautions: Standard, fall   Orthopedic Precautions:N/A   Braces: N/A  Respiratory Status: Room air     Occupational Performance:     Functional Mobility/Transfers:  · Patient completed Sit <> Stand Transfer with contact guard assistance  with  rolling walker     Activities of Daily Living:  · Grooming: Set-up assistance        [facility] 6 Click ADL:      Treatment & Education:  Role of OT, call bell use, safety awareness, importance of OOB activity.  Pt completed sit<>stand, oral hygiene while sitting in chair.    Patient left up in chair with all lines intact, call button in reach and chair alarm onEducation:      GOALS:   Multidisciplinary Problems     Occupational Therapy Goals         Problem: Occupational Therapy Goal    Goal Priority Disciplines Outcome Interventions   Occupational Therapy Goal     OT, PT/OT Ongoing, Progressing    Description: Goals to be met by: discharge     Patient will increase functional independence with ADLs by performing:    UE Dressing with Stand-by Assistance.  LE Dressing with Stand-by Assistance.  Grooming while standing at sink with Stand-by Assistance.  Toileting from toilet with Stand-by Assistance for hygiene and clothing management.   Toilet transfer to toilet with Stand-by Assistance.                     Time Tracking:     OT Date of Treatment: 01/07/22  OT Start Time: 1003  OT Stop Time: 1013  OT Total Time (min): 10 min    Billable Minutes:Self Care/Home Management 10    OT/LIYAH: OT          1/7/2022

## 2022-01-07 NOTE — PROGRESS NOTES
Critical access hospital Medicine  Progress Note    Patient Name: Steff Johnson  MRN: [id number]  Patient Class: IP- Inpatient   Admission Date: 1/5/2022  Length of Stay: 1 days  Attending Physician: Roger Berry MD  Primary Care Provider: Cristina Ren MD        Subjective:     Principal Problem:Acute CVA (cerebrovascular accident)        HPI:  61 year old patient getting admitted with acute CVA   Pt started having right-sided weakness and slurred speech and was having decreased responsiveness for over 24 hours.    Because patient did not improve over the next 24 hour ,family contacted EMS and patient was transported here.    Clinically and imaging wise pt had CVA and pt got admitted  Pt unable to provide further H/o      Overview/Hospital Course:  01/06  Repeat CT showed stable micro hgge  Pt was restarted back on aspirin        Interval History:     Review of Systems   Constitutional: Negative for activity change and appetite change.   HENT: Negative for congestion and dental problem.    Eyes: Negative for discharge and itching.   Respiratory: Negative for shortness of breath.    Cardiovascular: Negative for chest pain.   Gastrointestinal: Negative for abdominal distention and abdominal pain.   Endocrine: Negative for cold intolerance.   Genitourinary: Negative for difficulty urinating and dysuria.   Musculoskeletal: Negative for arthralgias and back pain.   Skin: Negative for color change.   Neurological: Positive for weakness. Negative for dizziness and facial asymmetry.   Hematological: Negative for adenopathy.   Psychiatric/Behavioral: Negative for agitation and behavioral problems.     Objective:     Vital Signs (Most Recent):  Temp: 98.6 °F (37 °C) (01/06/22 1952)  Pulse: 102 (01/06/22 1952)  Resp: 16 (01/06/22 1952)  BP: (!) 152/71 (01/06/22 1952)  SpO2: (!) 94 % (01/06/22 1952) Vital Signs (24h Range):  Temp:  [97.8 °F (36.6 °C)-98.9 °F (37.2 °C)] 98.6 °F (37 °C)  Pulse:  [] 102  Resp:   [16-18] 16  SpO2:  [94 %-98 %] 94 %  BP: (136-158)/(66-85) 152/71     Weight: 47.3 kg (104 lb 4.4 oz)  Body mass index is 21.06 kg/m².    Intake/Output Summary (Last 24 hours) at 1/6/2022 1958  Last data filed at 1/6/2022 0525  Gross per 24 hour   Intake --   Output 50 ml   Net -50 ml      Physical Exam  Vitals and nursing note reviewed.   Constitutional:       General: She is not in acute distress.  HENT:      Head: Atraumatic.      Right Ear: External ear normal.      Left Ear: External ear normal.      Nose: Nose normal.      Mouth/Throat:      Mouth: Mucous membranes are moist.   Eyes:      General: No scleral icterus.     Extraocular Movements: EOM normal.   Cardiovascular:      Rate and Rhythm: Normal rate.   Pulmonary:      Effort: Pulmonary effort is normal.   Abdominal:      General: There is no distension.   Musculoskeletal:         General: No edema. Normal range of motion.      Cervical back: Normal range of motion and neck supple.   Skin:     General: Skin is warm.   Neurological:      Mental Status: She is alert. Mental status is at baseline.   Psychiatric:         Mood and Affect: Mood and affect normal.         Behavior: Behavior normal.         Significant Labs:   All pertinent labs within the past 24 hours have been reviewed.  CBC:   Recent Labs   Lab 01/05/22  1034 01/06/22  0449   WBC 12.33 13.64*   HGB 12.7 12.1   HCT 35.9* 34.8*   * 487*     CMP:   Recent Labs   Lab 01/05/22  1034 01/06/22  0449   * 138   K 4.0 4.0   CL 98 103   CO2 21* 23   * 227*   BUN 33* 38*   CREATININE 1.9* 1.6*   CALCIUM 9.6 9.7   PROT 8.2 7.5   ALBUMIN 4.0 3.7   BILITOT 1.4* 1.0   ALKPHOS 73 68   AST 28 18   ALT 25 19   ANIONGAP 16 12   EGFRNONAA 27.7* 34.0*       Significant Imaging: I have reviewed all pertinent imaging results/findings within the past 24 hours.      Assessment/Plan:      * Acute CVA (cerebrovascular accident)  Continue aspirin/statins  BP control  Repeat CT brain on 1/6 is  stable  Pt can go home Tmrw if stable with HH         COURTNEY (acute kidney injury)  Monitor Renal panels closely         H/o Cerebrovascular accident (CVA) of left pontine structure  Per Chart review       Continuous opioid dependence  Per chart review       Poorly controlled diabetes mellitus  Poorly controlled DM on insulin regime      Hypertension associated with diabetes  On Clonidine Patch at home        VTE Risk Mitigation (From admission, onward)         Ordered     IP VTE HIGH RISK PATIENT  Once         01/05/22 1345     Place sequential compression device  Until discontinued         01/05/22 1345                Discharge Planning   ALEX: 1/7/2022     Code Status: Full Code   Is the patient medically ready for discharge?:     Reason for patient still in hospital (select all that apply): Treatment  Discharge Plan A: Home with family,Home Health                  Roger Berry MD  Department of Hospital Medicine   [facility]

## 2022-01-07 NOTE — HOSPITAL COURSE
Pt with H/o HTN/DM /CVA h/o ,admitted with acute CVA  Pt was evaluated by Neurology team , PT/OT/ST team  Later pt was discharged to home with HH

## 2022-01-07 NOTE — PLAN OF CARE
Patient AAOx3, discharged per MD order. Left AC #20 IV taken out, tip intact. Telemetry monitor discontinued and returned to monitor tech. Vitals stable. Pt ambulated in room assisted using her walker with no problems and dressed self. All discharge instructions given and pt verbalizes full understanding to all instructions, follow up appt scheduled and all questions answered. Pt was taken down to sister's private car via wheelchair with all belongings.

## 2022-01-07 NOTE — PLAN OF CARE
1153 Discharge orders entered with home health ordered. I called the patient's sister, Gita Johnson [phone] to ask if the patient has ever had home health and to inform her that the patient is being discharged and has home health ordered; I asked if they knew of a home health company that they would like to use because  they have a right to choose. The list was left at the bedside, yesterday , by my colleague; if they do not have a preference, I could assignone.  Gita said that she needed to call the person who will be taking care of the patient and will call me back.    [id number] Gita called back to informed me that they do not have a preference. Patient choice form completed based on this converstion.

## 2022-01-07 NOTE — SUBJECTIVE & OBJECTIVE
Interval History:     Review of Systems   Constitutional: Negative for activity change and appetite change.   HENT: Negative for congestion and dental problem.    Eyes: Negative for discharge and itching.   Respiratory: Negative for shortness of breath.    Cardiovascular: Negative for chest pain.   Gastrointestinal: Negative for abdominal distention and abdominal pain.   Endocrine: Negative for cold intolerance.   Genitourinary: Negative for difficulty urinating and dysuria.   Musculoskeletal: Negative for arthralgias and back pain.   Skin: Negative for color change.   Neurological: Positive for weakness. Negative for dizziness and facial asymmetry.   Hematological: Negative for adenopathy.   Psychiatric/Behavioral: Negative for agitation and behavioral problems.     Objective:     Vital Signs (Most Recent):  Temp: 98.6 °F (37 °C) (01/06/22 1952)  Pulse: 102 (01/06/22 1952)  Resp: 16 (01/06/22 1952)  BP: (!) 152/71 (01/06/22 1952)  SpO2: (!) 94 % (01/06/22 1952) Vital Signs (24h Range):  Temp:  [97.8 °F (36.6 °C)-98.9 °F (37.2 °C)] 98.6 °F (37 °C)  Pulse:  [] 102  Resp:  [16-18] 16  SpO2:  [94 %-98 %] 94 %  BP: (136-158)/(66-85) 152/71     Weight: 47.3 kg (104 lb 4.4 oz)  Body mass index is 21.06 kg/m².    Intake/Output Summary (Last 24 hours) at 1/6/2022 1958  Last data filed at 1/6/2022 0525  Gross per 24 hour   Intake --   Output 50 ml   Net -50 ml      Physical Exam  Vitals and nursing note reviewed.   Constitutional:       General: She is not in acute distress.  HENT:      Head: Atraumatic.      Right Ear: External ear normal.      Left Ear: External ear normal.      Nose: Nose normal.      Mouth/Throat:      Mouth: Mucous membranes are moist.   Eyes:      General: No scleral icterus.     Extraocular Movements: EOM normal.   Cardiovascular:      Rate and Rhythm: Normal rate.   Pulmonary:      Effort: Pulmonary effort is normal.   Abdominal:      General: There is no distension.   Musculoskeletal:          General: No edema. Normal range of motion.      Cervical back: Normal range of motion and neck supple.   Skin:     General: Skin is warm.   Neurological:      Mental Status: She is alert. Mental status is at baseline.   Psychiatric:         Mood and Affect: Mood and affect normal.         Behavior: Behavior normal.         Significant Labs:   All pertinent labs within the past 24 hours have been reviewed.  CBC:   Recent Labs   Lab 01/05/22  1034 01/06/22  0449   WBC 12.33 13.64*   HGB 12.7 12.1   HCT 35.9* 34.8*   * 487*     CMP:   Recent Labs   Lab 01/05/22  1034 01/06/22  0449   * 138   K 4.0 4.0   CL 98 103   CO2 21* 23   * 227*   BUN 33* 38*   CREATININE 1.9* 1.6*   CALCIUM 9.6 9.7   PROT 8.2 7.5   ALBUMIN 4.0 3.7   BILITOT 1.4* 1.0   ALKPHOS 73 68   AST 28 18   ALT 25 19   ANIONGAP 16 12   EGFRNONAA 27.7* 34.0*       Significant Imaging: I have reviewed all pertinent imaging results/findings within the past 24 hours.

## 2022-01-07 NOTE — ASSESSMENT & PLAN NOTE
Continue aspirin/statins  BP control  Repeat CT brain on 1/6 is stable  Pt can go home Tmrw if stable with HH

## 2022-01-07 NOTE — PROGRESS NOTES
[facility]  Department of Neurology  Neurology Progress Note        PATIENT NAME: Steff Johnson  MRN: [id number]  CSN: [id number]      TODAY'S DATE: 01/07/2022  ADMIT DATE: 1/5/2022                            CONSULTING PROVIDER: Jennifer Tony MD  CONSULT REQUESTED BY: Roger Berry MD      Reason for consult: CVA       History obtained from chart review and the patient.    HPI: Steff Johnson is a 61 y.o. female with a history of HTN, stroke, presented with right-sided weakness and slurred speech and was having decreased responsiveness for over 24 hours.     Family has not seen any improvement in one day and they brought her to the ER. In the ER, she had R side weakness, dysarthria. CTH showed R BG hyper density.     On my exam, she was drowsy but wakes up to stimulus and was oriented x3. She had R Facial droop, mild R side weakness, dysarthria. CTA showed no LVO.     Tele stroke eval was done and she was not a candidate for tPA or KUMAR.      1/6/22:  Patient was seen and examined by me today.  Her right-sided weakness and dysarthria seemed to be mildly improved.  There were no acute events overnight, and patient denies any worsening of her symptoms.    1/7/22:  Patient was seen and examined by me today.  Neuro exam has continued to improve, she has no complaints today.    CURRENT SCHEDULED MEDICATIONS:   aspirin  81 mg Oral Daily    atorvastatin  80 mg Per NG tube Daily    cloNIDine 0.1 mg/24 hr td ptwk  1 patch Transdermal Q7 Days     CURRENT INFUSIONS:    CURRENT PRN MEDICATIONS:  calcium chloride IVPB, calcium chloride IVPB, calcium chloride IVPB, dextrose 50%, glucagon (human recombinant), HYDROmorphone, insulin aspart U-100, labetalol, magnesium oxide, magnesium sulfate IVPB, magnesium sulfate IVPB, magnesium sulfate IVPB, magnesium sulfate IVPB, ondansetron, potassium chloride in water, potassium chloride in water, potassium chloride in water, potassium chloride in water, potassium  chloride, potassium chloride, potassium chloride, potassium chloride, sodium chloride 0.9%       PHYSICAL EXAM:  Patient Vitals for the past 24 hrs:   BP Temp Temp src Pulse Resp SpO2   01/07/22 0720 (!) 153/76 98.9 °F (37.2 °C) Oral 80 16 97 %   01/07/22 0300 (!) 150/72 97.9 °F (36.6 °C) Oral 69 18 98 %   01/06/22 2300 (!) 163/78 98.9 °F (37.2 °C) Oral 86 18 97 %   01/06/22 1952 (!) 152/71 98.6 °F (37 °C) Oral 102 16 (!) 94 %   01/06/22 1608 (!) 149/81 98 °F (36.7 °C) Oral 82 18 98 %   01/06/22 1147 (!) 151/74 97.8 °F (36.6 °C) Oral 83 18 97 %       GENERAL APPEARANCE: Alert, well-developed, well-nourished female in no acute distress.  HEENT: Normocephalic and atraumatic. PERRL. Oropharynx unremarkable.  PULM: Normal respiratory effort. No accessory muscle use.  CV: RRR.  ABDOMEN: Soft, nontender.  EXTREMITIES: No obvious signs of vascular compromise. Pulses present. No cyanosis, clubbing or edema.  SKIN: Clear; no rashes, lesions or skin breaks in exposed areas.    NEURO:  MENTAL STATUS: Awake and alert, oriented x3, speech is slurred.  Affect euthymic.    CRANIAL NERVES:  CN I: Not tested.  CN II: Fundoscopic exam deferred.  CN III, IV, VI: Pupils equal, round and reactive to light.  Extraocular movements full and intact.  CN V: Facial sensation normal.  CN VII: Facial asymmetry noted for right facial droop.  CN VIII: Hearing grossly normal and equal bilaterally.  No skew deviation or pathologic nystagmus.  CN IX, X: Palate elevates symmetrically. Speech/articulation is dysarthric.  CN XI: Shoulder shrug and chin rotation equal with good strength.  CN XII: Tongue protrusion midline.    MOTOR:  Bulk normal. Tone normal and symmetric throughout.  Abnormal movements absent.  Tremor: none present.  Strength Right upper extremity 4/5, right lower extremity 4+/5.  Left upper and lower extremity 5/5..    REFLEXES:  DTRs 2+ throughout.  Plantar response equivocal bilaterally.  SENSATION: grossly intact  throughout.  COORDINATION: normal finger-to-nose.  STATION: not tested.  GAIT: not tested.      NIHSS:  1a      Level of Consciousness (alert, drowsy, etc.):   0=alert; keenly responsive  1b.     Level of Consciousness Questions (month, age): 0= able to answer both questions  1c.     Level of Consciousness Commands (open, close eyes, make fist, let go):  0=Answers both tasks correctly  2.      Best Gaze (eyes open - patient follows examiner's finger or face):      0=normal  3.      Visual (introduce visual stimulus/threat to patient's visual field quadrants):  0=No visual loss  4.      Facial Palsy (show teeth, raise eyebrows and squeeze eyes shut):        0=Normal symmetric movement  5a.     Motor Arm - Left (elevate extremity 90 degrees and score drift/movement):       0=No drift, limb holds 90 (or 45) degrees for full 10 seconds  5b.     Motor Arm - Right (elevate extremity 90 degrees and score drift/movement):      1=Drift, limb holds 90 (or 45) degrees but drifts down before full 10 seconds: does not hit bed  6a.     Motor Leg - Left (elevate extremity 30 degrees and score drift/movement):       0=No drift, limb holds 90 (or 45) degrees for full 10 seconds  6b      Motor Leg - Right (elevate extremity 30 degrees and score drift/movement):      0=No drift, limb holds 90 (or 45) degrees for full 10 seconds immune there  7.      Limb Ataxia (finger-nose, heel down shin):      0=Absent  8.      Sensory (pin prick to face, arm, trunk, and leg - compare side to side):        0=Normal; no sensory loss  9.      Best Language (name items, describe a picture and read sentences):      0=No aphasia, normal  10.     Dysarthria (evaluate speech clarity by patient repeating listed words): 2=Severe; patient speech is so slurred as to be unintelligible in the absence of or our of proportion to any dysphagia, or is mute/anarthric  11.     Extinction and Inattention (use prior testing to identify neglect or double simultaneous  stimuli testing):      0=No abnormality          NIH Stroke Scale Total:         3      Labs:  Recent Labs   Lab 01/05/22  1034 01/06/22 0449 01/07/22  0418   * 138 138   K 4.0 4.0 3.2*   CL 98 103 102   CO2 21* 23 24   BUN 33* 38* 35*   CREATININE 1.9* 1.6* 1.3   * 227* 171*   CALCIUM 9.6 9.7 9.2   PHOS  --  4.0  --    MG  --  2.0  --      Recent Labs   Lab 01/05/22  1034 01/06/22 0449 01/07/22  0418   WBC 12.33 13.64* 10.70   HGB 12.7 12.1 12.3   HCT 35.9* 34.8* 35.0*   * 487* 402     Recent Labs   Lab 01/05/22  1034 01/06/22 0449 01/07/22 0418   ALBUMIN 4.0 3.7 3.5   PROT 8.2 7.5 6.9   BILITOT 1.4* 1.0 1.3*   ALKPHOS 73 68 64   ALT 25 19 20   AST 28 18 24     Lab Results   Component Value Date    INR 1.2 01/06/2022     Lab Results   Component Value Date    TRIG 64 01/05/2022    HDL 93 (H) 01/05/2022    CHOLHDL 45.1 01/05/2022     Lab Results   Component Value Date    HGBA1C 12.7 (H) 01/06/2022     No results found for: PROTEINCSF, GLUCCSF, WBCCSF    Imaging:  I have reviewed and interpreted the pertinent imaging and lab results.      CT Head Without Contrast  CT head without contrast    CLINICAL DATA: CVA    CMS MANDATED QUALITY DATA - CT RADIATION  436    All CT scans at this facility utilize dose modulation, iterative reconstruction, and/or weight based dosing when appropriate to reduce radiation dose to as low as reasonably achievable.    Findings: Thin section axial noncontrast images are compared to January 5, 2022, as well as a prior study from November 2021.    Tiny hyperdensity suspicious for petechial hemorrhage at the caudate nucleus on the right is unchanged. No new areas of hemorrhage are identified.    There is no intracranial mass or midline shift. Ventricles and sulci are  Prominent. There are no pathologic extra-axial fluid collections.    There is no CT evidence of acute cortical ischemic change. Changes of moderate chronic microvascular white matter disease are noted. The  cerebellum and brainstem are unremarkable.    IMPRESSION:  1. Stable CT appearance of the brain compared to January 5. Specifically, tiny hyperdensity at the caudate nucleus on the right, presumably a small focus of petechial hemorrhage, is stable.    Electronically signed by:  Heri Stanford MD  1/6/2022 9:27 AM CST Workstation: [id number]    MRI of the brain without contrast 1/5/22:     HISTORY: Cerebrovascular accident.     This examination is severely degraded by patient motion despite repeated imaging. Repeat MRI is necessary when clinically able.     There is a suspicious area of longitudinally oriented restricted diffusion within the upper regina at the level of the superior cerebellar peduncles, potentially reflecting recent infarction. No additional areas of restricted diffusion identified.     There are apparent areas of remote small lacunar infarction within the left lentiform nucleus and bilateral thalami. There is an apparent small focus of decreased signal which corresponds with area of increased density on recent CT near the right caudate head. This could represent a small punctate focus of recent hemorrhage. Hemosiderin staining is observed within the left basal ganglia and medially within the right cerebellar hemisphere, likely related to remote microhemorrhage.     There are diffuse changes of white matter hyperintensity within the subcortical and periventricular regions of both hemispheres and within the regina, likely a reflection of chronic microvascular ischemia.     There is mild diffuse prominence of the ventricular system and sulci compatible with involutional changes. There are no extra-axial collections.     Appropriate flow voids are demonstrated in the major blood vessels.    IMPRESSION:     This is examination is of significantly limited diagnostic quality, substantially degraded by patient motion despite repeated imaging. The examination should be repeated when clinically  able.     Small area of presumed restricted diffusion within the upper regina suspicious for region of recent small vessel infarction.     Focal area of decreased signal within the right caudate head which corresponds to a punctate focus of increased density on recent CT. A small area of recent punctate hemorrhages not excluded. There are additional foci of hemosiderin staining observed within the leftward basal ganglia and medial right cerebellar hemisphere.     Changes compatible with chronic microvascular ischemia and mild involutional changes.    MRA head 1/5/22:  IMPRESSION:  Markedly limited, essentially nondiagnostic MRA of the brain, but with at least some flow identified in the intracranial circulation.    ASSESSMENT & PLAN:    Acute ischemic pontine stroke  Acute intracerebral hemorrhage  Acute lacunar infarct in the regina an acute hemorrhage in the caudate likely secondary to small-vessel disease from hypertension and uncontrolled diabetes.    Workup  · CTH:  Motion degraded study.  Hypodensity in the right basal ganglia likely small acute hemorrhage  · CTA head and neck:  No large vessel occlusion  · MRI brain:  Motion degraded study.  Acute infarcts in the regina.  Likely small acute hemorrhage in the right caudate, basal ganglia microhemorrhages consistent with small hypertensive hemorrhages  · ECHO from 7/2/2021:  Normal left ventricular systolic function EF 53%, no bubble study was performed.  · LDL: 206  · HbA1c: 12.7       Plan  · Admitted for further stroke workup  · Secondary stroke prevention with aspirin 81 mg daily and atorvastatin 80 mg daily  · Keep BP between 140 and 160 systolic  · PT OT  · Speech therapy  · DVT prophylaxis with chemo/SCD prophylaxis  · Extensively discussed lifestyle modifications as prophylactic measures for stroke prevention including smoking cessation, adequate blood pressure management, healthy diet and regular exercise.        Thank you kindly for including us in the care  of this patient. Please do not hesitate to contact us with any questions.      45 minutes of care time has been spent evaluating with the patient. Time includes chart review not limited to diagnostic imaging, labs, and vitals, patient assessment, discussion with family and nursing, current order evaluations, and new order entries.       Jennifer Tony MD  Neurology/Vascular Neurology  Date of Service: 01/07/2022

## 2022-01-07 NOTE — PLAN OF CARE
01/07/22 1205   Post-Acute Status   Post-Acute Authorization Home Health   Home Health Status Referrals Sent  ([facility])   Patient choice form signed by patient/caregiver   (Family has no preference.)   Discharge Delays (!) Personal Transportation   Discharge Plan   Discharge Plan A Home with family;Home Health   Discharge Plan B Home with family;Home Health       1153 Discharge orders entered with home health ordered. I called the patient's sister, Gita Johnson [phone] to ask if the patient has ever had home health and to inform her that the patient is being discharged and has home health ordered; I asked if they knew of a home health company that they would like to use because  they have a right to choose. The list was left at the bedside, yesterday , by my colleague; if they do not have a preference, I could assignone.  Gita said that she needed to call the person who will be taking care of the patient and will call me back.    2640 Gita called back to informed me that they do not have a preference. Patient choice form completed based on this converstion.    1211 Referral sent to [facility]. CM will follow up to get SOC date, if accepted.

## 2022-01-08 NOTE — DISCHARGE SUMMARY
Critical access hospital Medicine  Discharge Summary      Patient Name: Steff Johnson  MRN: [id number]  Patient Class: IP- Inpatient  Admission Date: 1/5/2022  Hospital Length of Stay: 2 days  Discharge Date and Time:  01/07/2022 7:56 PM  Attending Physician: No att. providers found   Discharging Provider: Roger Berry MD  Primary Care Provider: Cristina Ren MD      HPI:   61 year old patient getting admitted with acute CVA   Pt started having right-sided weakness and slurred speech and was having decreased responsiveness for over 24 hours.    Because patient did not improve over the next 24 hour ,family contacted EMS and patient was transported here.    Clinically and imaging wise pt had CVA and pt got admitted  Pt unable to provide further H/o      * No surgery found *      Hospital Course:   Pt with H/o HTN/DM /CVA h/o ,admitted with acute CVA  Pt was evaluated by Neurology team , PT/OT/ST team  Later pt was discharged to home with HH          Goals of Care Treatment Preferences:  Code Status: Full Code      Consults:   Consults (From admission, onward)        Status Ordering Provider     Inpatient consult to Neurology  Once        Provider:  Geoffrey Roque MD    Completed ROGER BERRY     IP consult to case management/social work  Once        Provider:  (Not yet assigned)    Completed ROGER BERRY new Assessment & Plan notes have been filed under this hospital service since the last note was generated.  Service: Hospital Medicine    Final Active Diagnoses:    Diagnosis Date Noted POA    H/o Cerebrovascular accident (CVA) of left pontine structure [I63.9] 12/12/2019 Yes    Poorly controlled diabetes mellitus [E11.65] 06/05/2015 Yes    Hypertension associated with diabetes [E11.59, I15.2] 03/31/2012 Yes      Problems Resolved During this Admission:    Diagnosis Date Noted Date Resolved POA    PRINCIPAL PROBLEM:  Acute CVA (cerebrovascular accident) [I63.9] 01/05/2022 01/07/2022 Unknown     COURTNEY (acute kidney injury) [N17.9] 01/05/2022 01/07/2022 Unknown    Continuous opioid dependence [F11.20] 03/26/2019 01/07/2022 Yes       Discharged Condition: good    Disposition: Home-Health Care [facility]    Follow Up:   Contact information for follow-up providers     Jennifer Tony MD In 1 week.    Specialty: Neurology  Contact information:  [address]  [facility] [address]  [phone]                   Contact information for after-discharge care     Home Medical Care     [facility] .    Service: Home Health Services  Contact information:  [address] [facility], [address]  [facility] [id number] [phone]                           Patient Instructions:      Ambulatory referral/consult to Home Health   Standing Status: Future   Referral Priority: Routine Referral Type: Home Health Care   Referral Reason: Specialty Services Required   Requested Specialty: Home Health Services   Number of Visits Requested: 1       Significant Diagnostic Studies: Labs:   CMP   Recent Labs   Lab 01/06/22 0449 01/07/22 0418    138   K 4.0 3.2*    102   CO2 23 24   * 171*   BUN 38* 35*   CREATININE 1.6* 1.3   CALCIUM 9.7 9.2   PROT 7.5 6.9   ALBUMIN 3.7 3.5   BILITOT 1.0 1.3*   ALKPHOS 68 64   AST 18 24   ALT 19 20   ANIONGAP 12 12   ESTGFRAFRICA 39.3* 50.5*   EGFRNONAA 34.0* 43.8*    and CBC   Recent Labs   Lab 01/06/22 0449 01/06/22 0449 01/07/22 0418   WBC 13.64*  --  10.70   HGB 12.1  --  12.3   HCT 34.8*   < > 35.0*   *  --  402    < > = values in this interval not displayed.       Pending Diagnostic Studies:     None         Medications:  Reconciled Home Medications:      Medication List      CHANGE how you take these medications    ergocalciferol 50,000 unit Cap  Commonly known as: ERGOCALCIFEROL  TK 1 C PO Q 7 DAYS  What changed:   · how much to take  · how to take this  · when to take this  · additional instructions        CONTINUE taking these  "medications    aspirin 81 MG Chew  Take 1 tablet (81 mg total) by mouth once daily.     atorvastatin 40 MG tablet  Commonly known as: LIPITOR  Take 1 tablet (40 mg total) by mouth once daily.     blood glucose control, low Soln  Use as directed     blood-glucose meter kit  Commonly known as: TRUE METRIX GLUCOSE METER  Use as instructed     chlorthalidone 50 MG Tab  Commonly known as: HYGROTEN  Take 1 tablet (50 mg total) by mouth once daily.     cloNIDine 0.1 mg/24 hr td ptwk 0.1 mg/24 hr  Commonly known as: CATAPRES  Place 1 patch onto the skin every 7 days.     hydrALAZINE 100 MG tablet  Commonly known as: APRESOLINE  Take 1 tablet (100 mg total) by mouth 3 (three) times daily.     INCRUSE ELLIPTA 62.5 mcg/actuation inhalation capsule  Generic drug: umeclidinium  Inhale 1 capsule (63 mcg total) into the lungs once daily. Controller     insulin aspart U-100 100 unit/mL (3 mL) Inpn pen  Commonly known as: NovoLOG Flexpen U-100 Insulin  Inject 12 Units into the skin 3 (three) times daily before meals. 12 u AC + correction MaxTDD 60u     lancets Misc  To check BG 2 times daily, to use with insurance preferred meter     metoprolol succinate 100 MG 24 hr tablet  Commonly known as: TOPROL-XL  Take 1 tablet (100 mg total) by mouth once daily.     * pen needle, diabetic 32 gauge x 5/32" Ndle  Commonly known as: BD MI 2ND GEN PEN NEEDLE  Uses 4 daily     * pen needle, diabetic 32 gauge x 5/32" Ndle  Commonly known as: BD MI 2ND GEN PEN NEEDLE  Inject daily     TRESIBA FLEXTOUCH U-100 100 unit/mL (3 mL) insulin pen  Generic drug: insulin degludec  Inject 40 Units into the skin once daily.         * This list has 2 medication(s) that are the same as other medications prescribed for you. Read the directions carefully, and ask your doctor or other care provider to review them with you.            STOP taking these medications    HYDROcodone-acetaminophen  mg per tablet  Commonly known as: NORCO            Indwelling " Lines/Drains at time of discharge:   Lines/Drains/Airways     None               Physical Exam   Constitutional: She is oriented to person, place, and time.   Cardiovascular: Normal rate.   Neurological: She is alert and oriented to person, place, and time.     Time spent on the discharge of patient: 44  minutes         Roger Berry MD  Department of Hospital Medicine  [facility]

## 2022-01-10 PROCEDURE — G0180 MD CERTIFICATION HHA PATIENT: HCPCS | Mod: ,,, | Performed by: FAMILY MEDICINE

## 2022-01-10 PROCEDURE — G0180 PR HOME HEALTH MD CERTIFICATION: ICD-10-PCS | Mod: ,,, | Performed by: FAMILY MEDICINE

## 2022-01-11 ENCOUNTER — TELEPHONE (OUTPATIENT)
Dept: FAMILY MEDICINE | Facility: CLINIC | Age: 62
End: 2022-01-11
Payer: MEDICARE

## 2022-01-11 NOTE — TELEPHONE ENCOUNTER
Returned call and spoke to patient  regarding medication. Patient  stated that medication list has been updated.

## 2022-01-11 NOTE — TELEPHONE ENCOUNTER
----- Message from Alfredo Hewitt sent at 1/10/2022  4:20 PM CST -----  Contact: pt- Nish  Type: Needs Medical Advice    Who Called: pt/ Nish    Best Call Back Number: [phone]     Requesting a call back regarding - asking for a call please home health came over today . But he is not seeing a lot of the medications on the list please call.          Please Advise- Thank you

## 2022-01-12 ENCOUNTER — TELEPHONE (OUTPATIENT)
Dept: FAMILY MEDICINE | Facility: CLINIC | Age: 62
End: 2022-01-12
Payer: MEDICARE

## 2022-01-12 DIAGNOSIS — J44.9 CHRONIC OBSTRUCTIVE PULMONARY DISEASE, UNSPECIFIED COPD TYPE: Primary | ICD-10-CM

## 2022-01-12 NOTE — PT/OT/SLP DISCHARGE
Occupational Therapy Discharge Summary    Steff Johnson  MRN: [id number]   Principal Problem: Acute CVA (cerebrovascular accident)      Patient Discharged from acute Occupational Therapy on 1/7/22.  Please refer to prior OT note dated 1/7/22 for functional status.    Assessment:      Patient appropriate for care in another setting.    Objective:     GOALS:   Multidisciplinary Problems     Occupational Therapy Goals        Problem: Occupational Therapy Goal    Goal Priority Disciplines Outcome Interventions   Occupational Therapy Goal     OT, PT/OT Ongoing, Progressing    Description: Goals to be met by: discharge     Patient will increase functional independence with ADLs by performing:    UE Dressing with Stand-by Assistance.  LE Dressing with Stand-by Assistance.  Grooming while standing at sink with Stand-by Assistance.  Toileting from toilet with Stand-by Assistance for hygiene and clothing management.   Toilet transfer to toilet with Stand-by Assistance.                     Reasons for Discontinuation of Therapy Services  Transfer to alternate level of care.      Plan:     Patient Discharged to: Home with Home Health Service    1/12/2022

## 2022-01-17 NOTE — TELEPHONE ENCOUNTER
Has not been seen by me since 2/2021. Recently hospitalized with stroke. Needs hospital f/u asap with me or SHWETA Moran.  I called in trelegy as alternative to incruse

## 2022-01-18 ENCOUNTER — EXTERNAL HOME HEALTH (OUTPATIENT)
Dept: HOME HEALTH SERVICES | Facility: HOSPITAL | Age: 62
End: 2022-01-18
Payer: MEDICARE

## 2022-01-26 DIAGNOSIS — E11.65 POORLY CONTROLLED DIABETES MELLITUS: ICD-10-CM

## 2022-02-01 ENCOUNTER — OFFICE VISIT (OUTPATIENT)
Dept: FAMILY MEDICINE | Facility: CLINIC | Age: 62
End: 2022-02-01
Payer: MEDICARE

## 2022-02-01 VITALS
DIASTOLIC BLOOD PRESSURE: 98 MMHG | TEMPERATURE: 98 F | HEIGHT: 59 IN | BODY MASS INDEX: 22.89 KG/M2 | RESPIRATION RATE: 14 BRPM | HEART RATE: 67 BPM | WEIGHT: 113.56 LBS | OXYGEN SATURATION: 98 % | SYSTOLIC BLOOD PRESSURE: 212 MMHG

## 2022-02-01 DIAGNOSIS — E11.65 POORLY CONTROLLED DIABETES MELLITUS: ICD-10-CM

## 2022-02-01 DIAGNOSIS — N18.32 STAGE 3B CHRONIC KIDNEY DISEASE: ICD-10-CM

## 2022-02-01 DIAGNOSIS — I63.9 CEREBROVASCULAR ACCIDENT (CVA) OF LEFT PONTINE STRUCTURE: ICD-10-CM

## 2022-02-01 DIAGNOSIS — M25.511 CHRONIC RIGHT SHOULDER PAIN: ICD-10-CM

## 2022-02-01 DIAGNOSIS — J44.9 CHRONIC OBSTRUCTIVE PULMONARY DISEASE, UNSPECIFIED COPD TYPE: ICD-10-CM

## 2022-02-01 DIAGNOSIS — E11.59 HYPERTENSION ASSOCIATED WITH DIABETES: Primary | ICD-10-CM

## 2022-02-01 DIAGNOSIS — I10 HYPERTENSION, UNCONTROLLED: ICD-10-CM

## 2022-02-01 DIAGNOSIS — I61.9 HEMORRHAGIC STROKE: ICD-10-CM

## 2022-02-01 DIAGNOSIS — G89.29 CHRONIC RIGHT SHOULDER PAIN: ICD-10-CM

## 2022-02-01 DIAGNOSIS — E78.2 MIXED HYPERLIPIDEMIA: ICD-10-CM

## 2022-02-01 DIAGNOSIS — I15.2 HYPERTENSION ASSOCIATED WITH DIABETES: Primary | ICD-10-CM

## 2022-02-01 DIAGNOSIS — I10 UNCONTROLLED HYPERTENSION: ICD-10-CM

## 2022-02-01 DIAGNOSIS — Z79.4 TYPE 2 DIABETES MELLITUS WITH DIABETIC NEPHROPATHY, WITH LONG-TERM CURRENT USE OF INSULIN: ICD-10-CM

## 2022-02-01 DIAGNOSIS — E11.21 TYPE 2 DIABETES MELLITUS WITH DIABETIC NEPHROPATHY, WITH LONG-TERM CURRENT USE OF INSULIN: ICD-10-CM

## 2022-02-01 PROCEDURE — 3008F BODY MASS INDEX DOCD: CPT | Mod: HCNC,CPTII,S$GLB, | Performed by: FAMILY MEDICINE

## 2022-02-01 PROCEDURE — 99499 UNLISTED E&M SERVICE: CPT | Mod: HCNC,S$GLB,, | Performed by: FAMILY MEDICINE

## 2022-02-01 PROCEDURE — 1159F PR MEDICATION LIST DOCUMENTED IN MEDICAL RECORD: ICD-10-PCS | Mod: HCNC,CPTII,S$GLB, | Performed by: FAMILY MEDICINE

## 2022-02-01 PROCEDURE — 99214 OFFICE O/P EST MOD 30 MIN: CPT | Mod: HCNC,S$GLB,, | Performed by: FAMILY MEDICINE

## 2022-02-01 PROCEDURE — 3008F PR BODY MASS INDEX (BMI) DOCUMENTED: ICD-10-PCS | Mod: HCNC,CPTII,S$GLB, | Performed by: FAMILY MEDICINE

## 2022-02-01 PROCEDURE — 99999 PR PBB SHADOW E&M-EST. PATIENT-LVL V: CPT | Mod: PBBFAC,HCNC,, | Performed by: FAMILY MEDICINE

## 2022-02-01 PROCEDURE — 3080F DIAST BP >= 90 MM HG: CPT | Mod: HCNC,CPTII,S$GLB, | Performed by: FAMILY MEDICINE

## 2022-02-01 PROCEDURE — 99214 PR OFFICE/OUTPT VISIT, EST, LEVL IV, 30-39 MIN: ICD-10-PCS | Mod: HCNC,S$GLB,, | Performed by: FAMILY MEDICINE

## 2022-02-01 PROCEDURE — 3046F HEMOGLOBIN A1C LEVEL >9.0%: CPT | Mod: HCNC,CPTII,S$GLB, | Performed by: FAMILY MEDICINE

## 2022-02-01 PROCEDURE — 99999 PR PBB SHADOW E&M-EST. PATIENT-LVL V: ICD-10-PCS | Mod: PBBFAC,HCNC,, | Performed by: FAMILY MEDICINE

## 2022-02-01 PROCEDURE — 3077F SYST BP >= 140 MM HG: CPT | Mod: HCNC,CPTII,S$GLB, | Performed by: FAMILY MEDICINE

## 2022-02-01 PROCEDURE — 1111F DSCHRG MED/CURRENT MED MERGE: CPT | Mod: HCNC,CPTII,S$GLB, | Performed by: FAMILY MEDICINE

## 2022-02-01 PROCEDURE — 1111F PR DISCHARGE MEDS RECONCILED W/ CURRENT OUTPATIENT MED LIST: ICD-10-PCS | Mod: HCNC,CPTII,S$GLB, | Performed by: FAMILY MEDICINE

## 2022-02-01 PROCEDURE — 3046F PR MOST RECENT HEMOGLOBIN A1C LEVEL > 9.0%: ICD-10-PCS | Mod: HCNC,CPTII,S$GLB, | Performed by: FAMILY MEDICINE

## 2022-02-01 PROCEDURE — 1159F MED LIST DOCD IN RCRD: CPT | Mod: HCNC,CPTII,S$GLB, | Performed by: FAMILY MEDICINE

## 2022-02-01 PROCEDURE — 1160F PR REVIEW ALL MEDS BY PRESCRIBER/CLIN PHARMACIST DOCUMENTED: ICD-10-PCS | Mod: HCNC,CPTII,S$GLB, | Performed by: FAMILY MEDICINE

## 2022-02-01 PROCEDURE — 99499 RISK ADDL DX/OHS AUDIT: ICD-10-PCS | Mod: HCNC,S$GLB,, | Performed by: FAMILY MEDICINE

## 2022-02-01 PROCEDURE — 3077F PR MOST RECENT SYSTOLIC BLOOD PRESSURE >= 140 MM HG: ICD-10-PCS | Mod: HCNC,CPTII,S$GLB, | Performed by: FAMILY MEDICINE

## 2022-02-01 PROCEDURE — 1160F RVW MEDS BY RX/DR IN RCRD: CPT | Mod: HCNC,CPTII,S$GLB, | Performed by: FAMILY MEDICINE

## 2022-02-01 PROCEDURE — 3080F PR MOST RECENT DIASTOLIC BLOOD PRESSURE >= 90 MM HG: ICD-10-PCS | Mod: HCNC,CPTII,S$GLB, | Performed by: FAMILY MEDICINE

## 2022-02-01 RX ORDER — METOPROLOL SUCCINATE 100 MG/1
100 TABLET, EXTENDED RELEASE ORAL DAILY
Qty: 90 TABLET | Refills: 3 | Status: SHIPPED | OUTPATIENT
Start: 2022-02-01 | End: 2022-11-09 | Stop reason: SDUPTHER

## 2022-02-01 RX ORDER — CHLORTHALIDONE 50 MG/1
50 TABLET ORAL DAILY
Qty: 90 TABLET | Refills: 3 | Status: SHIPPED | OUTPATIENT
Start: 2022-02-01 | End: 2022-05-20

## 2022-02-01 RX ORDER — INSULIN ASPART 100 [IU]/ML
12 INJECTION, SOLUTION INTRAVENOUS; SUBCUTANEOUS
Qty: 45 ML | Refills: 3 | Status: SHIPPED | OUTPATIENT
Start: 2022-02-01 | End: 2022-08-02 | Stop reason: SDUPTHER

## 2022-02-01 RX ORDER — INSULIN DEGLUDEC 100 U/ML
40 INJECTION, SOLUTION SUBCUTANEOUS DAILY
Qty: 15 PEN | Refills: 3 | Status: SHIPPED | OUTPATIENT
Start: 2022-02-01 | End: 2022-08-01

## 2022-02-01 RX ORDER — ATORVASTATIN CALCIUM 40 MG/1
40 TABLET, FILM COATED ORAL DAILY
Qty: 90 TABLET | Refills: 3 | Status: SHIPPED | OUTPATIENT
Start: 2022-02-01 | End: 2022-11-09 | Stop reason: SDUPTHER

## 2022-02-01 RX ORDER — HYDRALAZINE HYDROCHLORIDE 100 MG/1
100 TABLET, FILM COATED ORAL 3 TIMES DAILY
Qty: 270 TABLET | Refills: 3 | Status: SHIPPED | OUTPATIENT
Start: 2022-02-01 | End: 2022-11-09 | Stop reason: SDUPTHER

## 2022-02-01 RX ORDER — CLONIDINE 0.1 MG/24H
1 PATCH, EXTENDED RELEASE TRANSDERMAL
Qty: 12 PATCH | Refills: 3 | Status: SHIPPED | OUTPATIENT
Start: 2022-02-01 | End: 2022-11-09 | Stop reason: SDUPTHER

## 2022-02-01 NOTE — PROGRESS NOTES
Subjective:       Patient ID: Steff Johnson is a 62 y.o. female.    Chief Complaint: Transitional Care    HPI  Review of Systems   Constitutional: Negative for fatigue and unexpected weight change.   Respiratory: Negative for chest tightness and shortness of breath.    Cardiovascular: Negative for chest pain, palpitations and leg swelling.   Gastrointestinal: Negative for abdominal pain.   Musculoskeletal: Negative for arthralgias.   Neurological: Negative for dizziness, syncope, light-headedness and headaches.       Patient Active Problem List   Diagnosis    Hypertension associated with diabetes    COPD (chronic obstructive pulmonary disease)    Poorly controlled diabetes mellitus    Proteinuria    Complete tear of left rotator cuff    Left shoulder pain    Shoulder stiffness, left    Shoulder weakness    Type 2 diabetes mellitus with diabetic nephropathy    Mixed hyperlipidemia    Type 2 diabetes mellitus with both eyes affected by moderate nonproliferative retinopathy without macular edema, with long-term current use of insulin    H/o Cerebrovascular accident (CVA) of left pontine structure    H/o Cranial nerve III palsy, left    Gait abnormality    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    Vitamin D deficiency    Hypertension    Hypertensive urgency    Right sided weakness    Frequent falls    COVID-19 infection    Osteomyelitis of finger of left hand    Paronychia of finger, left     Patient is here for a TCC follow up.    Last visit with me 2/2021. Was supposed to come in for monthly appts but   Only kept 1 appt 3/2021.   Has eye exam scheduled 2/9/2022 Dr. Barros  Presents today with /98. Did not take meds this morning.  Discharged on chlorthalidone 50mg, clonidine .1mg patch, hydralazine 100mg tid, toprol XL 100mg po qd. Does not know names of medications. Not taking any meds more than once a day. Clonidine patch fell off 2 days ago and has not replaced it.  Significant other  whom she lives with Nish Marti here today. He is also unfamiliar with her meds and dosing but willing to help assist.  IIC signed        Transitional Care Note    Family and/or Caretaker present at visit?  Yes.  Diagnostic tests reviewed/disposition: No diagnosic tests pending after this hospitalization.  Disease/illness education: yes  Home health/community services discussion/referrals: Patient does not have home health established from hospital visit.  They do need home health.  If needed, we will set up home health for the patient.   Establishment or re-establishment of referral orders for community resources: No other necessary community resources.   Discussion with other health care providers: No discussion with other health care providers necessary.     Patient has a recent hospitalization which is summarized above.  Communication (direct contact by telephone or electronic mail) with the patient and/or caregiver was documented within 2 business days of discharge.    Medical decision making was based on a face-to-face visit scheduled within the indicated time frame.  Medication reconciliation and management was completed at this visit.    Admitted [facility] 1/5/2022 for acute CVA-right sided weakness and slurred speech. CT head . New punctate hyperattenuating focus in the right basal ganglia suspicious for a small petechial bleed.  2. No hydrocephalus, herniation or midline shift.  3. Additional chronic/involutional findings as noted above.  MRI/MRA brain This is examination is of significantly limited diagnostic quality, substantially degraded by patient motion despite repeated imaging. The examination should be repeated when clinically able.   Small area of presumed restricted diffusion within the upper regina suspicious for region of recent small vessel infarction.   Focal area of decreased signal within the right caudate head which corresponds to a punctate focus of increased density on recent CT. A small area  of recent punctate hemorrhages not excluded. There are additional foci of hemosiderin staining observed within the leftward basal ganglia and medial right cerebellar hemisphere.   Changes compatible with chronic microvascular ischemia and mild involutional changes. MRA- Markedly limited, essentially nondiagnostic MRA of the brain, but with at least some flow identified in the intracranial circulation.     CTA head and neck 1. Focal minimal calcified plaque at origin of left cervical ICA, without narrowing.  2. Otherwise negative CTA brain and neck.   CT head repeated 1/6/2022 1. Stable CT appearance of the brain compared to January 5. Specifically, tiny hyperdensity at the caudate nucleus on the right, presumably a small focus of petechial hemorrhage, is stable.  Hospital Course:   Pt with H/o HTN/DM /CVA h/o ,admitted with acute CVA  Pt was evaluated by Neurology team , PT/OT/ST team  Later pt was discharged to home with HH     History:   Admitted 11/23/2021 [facility] for 1 day finger pain after fall. She is hypertensive 233/132 she was treated with hydralazine IV with improvement in her blood pressure. Found to have finger tip infection s/p I & D. Admitted for IV abx    Admitted 7/4/2021 for fall, difficulty with balance. She had come to emergency room couple days before and ER physician wanted to admit here however she decided to go home against medical advice.  She also admits that she is not taking her medications as prescribed and previous notes mentioned that she has a poor compliance with her antihypertensive regimen as well as insulin regimen.  In ED patient was found to have very elevated blood pressure, elevated troponin, and she was incidentally found to have COVID-19 infection.  Decision was made to admit her for right-sided weakness, frequent falls, elevated troponin. Hospital Course: She underwent extensive neurocardiac workup which showed micro hemorrhages.  She was cleared for discharge by Neurology on  aspirin and statin.  It appears that patient has very poorly controlled hypertension, already has residual right-sided weakness, poor vision    Admitted 3/31/2021 [facility] after running out of medsd in HTN urgency       HTN- h/o poorly controlled HTN even on 7 meds. Patient claims she is taking all meds as prescribed but is unable to name them.  Does not organize her meds in planner or have a checklist .    Admitted [facility] 12/12/19 for left pontine lacunar infarct basilar artery verified by MRI brain. Sx of being off balance and eye problem. Echo normal.  Started on asa and plavix x 21 d then asa.  Sent home with HH and PT/OT/ST. Neuro Mushtaq NiñoCheco.  Still having double vision and balance issues. Never got  Walker.      Endocrine Dr. Blanco-last visit 6/2021 -poorly controlled DM due to NC , vit d def.  A1c 12.7  Lipids at goal. On lipitor 40 ,  and asa today.  Taking tresiba 40 u at night and novolog 12u tid + correction per SS u with meals tid.  checking BS qid.      Ophth Dr. Rousseau/now Bernard/Arnol Complications from DM-prolif retinopathy and nephropathy, ocular htn     Nephro Dr. Echols CKD stage 3, malignant HTN . GFR 39 1/6/2022      Pain mgmt Dr. Costello treating chronic shoulder pain norco 10 tid. Dps checked no evidence of diversion        Mammo scheduled 3/20     Objective:      Physical Exam  Vitals and nursing note reviewed.   Constitutional:       Appearance: She is well-developed and well-nourished.   Cardiovascular:      Rate and Rhythm: Normal rate and regular rhythm.      Heart sounds: Normal heart sounds.   Pulmonary:      Effort: Pulmonary effort is normal.      Breath sounds: Normal breath sounds.   Musculoskeletal:         General: No edema.   Skin:     General: Skin is warm and dry.   Neurological:      Mental Status: She is alert and oriented to person, place, and time.   Psychiatric:         Mood and Affect: Mood and affect normal.         Assessment:       1. Hypertension associated with  diabetes    2. Poorly controlled diabetes mellitus    3. Mixed hyperlipidemia    4. H/o Cerebrovascular accident (CVA) of left pontine structure    5. Stage 3b chronic kidney disease    6. Hemorrhagic stroke    7. Chronic right shoulder pain    8. Chronic obstructive pulmonary disease, unspecified COPD type    9. Hypertension, uncontrolled    10. Type 2 diabetes mellitus with diabetic nephropathy, with long-term current use of insulin    11. Uncontrolled hypertension        Plan:       1. Hypertension associated with diabetes  Refer for   - Hypertension Digital Medicine (HDMP) Enrollment Order  - Ambulatory referral/consult to Outpatient Case Management  - Ambulatory referral/consult to Home Health; Future  Reviewed meds and discussed correct dosing. Printed list given for home consolidation. Recommended using [employer] to get patient assistance, home delivery and better coaching -patient agrees  - chlorthalidone (HYGROTEN) 50 MG Tab; Take 1 tablet (50 mg total) by mouth once daily.  Dispense: 90 tablet; Refill: 3    2. Poorly controlled diabetes mellitus  refer  - Diabetes Digital Medicine (DDMP) Enrollment Order  - Diabetes Digital Medicine (DDMP): Assign Onboarding Questionnaires  - Ambulatory referral/consult to Endocrinology; Future  - Ambulatory referral/consult to Outpatient Case Management  - Ambulatory referral/consult to Home Health; Future    3. Mixed hyperlipidemia  Controlled on current medications.  Continue current medications.    - atorvastatin (LIPITOR) 40 MG tablet; Take 1 tablet (40 mg total) by mouth once daily.  Dispense: 90 tablet; Refill: 3    4. H/o Cerebrovascular accident (CVA) of left pontine structure  Discussed need for better compliance with meds, appts, diet and care to prevent further strokes/complications    5. Stage 3b chronic kidney disease  Stable and chronic.  Will continue to monitor q3-6 months and control chronic conditions as optimally as possible to preserve  function.    - Ambulatory referral/consult to Nephrology; Future    6. Hemorrhagic stroke  Avoid restarting anticoag until neuro consult   - Ambulatory referral/consult to Neurology; Future  - Ambulatory referral/consult to Outpatient Case Management  - Ambulatory referral/consult to Home Health; Future    7. Chronic right shoulder pain  Refer for eval and treat  - Ambulatory referral/consult to Orthopedics; Future    8. Chronic obstructive pulmonary disease, unspecified COPD type  cont  - fluticasone-umeclidin-vilanter (TRELEGY ELLIPTA) 100-62.5-25 mcg DsDv; Inhale 1 puff into the lungs once daily.  Dispense: 180 each; Refill: 3    9. Hypertension, uncontrolled  Restart and monitior  - hydrALAZINE (APRESOLINE) 100 MG tablet; Take 1 tablet (100 mg total) by mouth 3 (three) times daily.  Dispense: 270 tablet; Refill: 3    10. Type 2 diabetes mellitus with diabetic nephropathy, with long-term current use of insulin  Continue and monitor  - insulin aspart U-100 (NOVOLOG FLEXPEN U-100 INSULIN) 100 unit/mL (3 mL) InPn pen; Inject 12 Units into the skin 3 (three) times daily before meals. 12 units before meals + correction scale. Max daily dose of 60 units.  Dispense: 45 mL; Refill: 3    11. Uncontrolled hypertension  See above  - metoprolol succinate (TOPROL-XL) 100 MG 24 hr tablet; Take 1 tablet (100 mg total) by mouth once daily.  Dispense: 90 tablet; Refill: 3          Time spent with patient: 20 minutes    Patient with be reevaluated in 4 weeks or sooner prn    Greater than 50% of this visit was spent counseling as described in above documentation:Yes

## 2022-02-02 ENCOUNTER — TELEPHONE (OUTPATIENT)
Dept: ORTHOPEDICS | Facility: CLINIC | Age: 62
End: 2022-02-02
Payer: MEDICARE

## 2022-02-07 ENCOUNTER — PATIENT OUTREACH (OUTPATIENT)
Dept: ADMINISTRATIVE | Facility: OTHER | Age: 62
End: 2022-02-07
Payer: MEDICARE

## 2022-02-07 DIAGNOSIS — Z12.31 ENCOUNTER FOR SCREENING MAMMOGRAM FOR MALIGNANT NEOPLASM OF BREAST: Primary | ICD-10-CM

## 2022-02-07 NOTE — PROGRESS NOTES
Chart was reviewed for overdue Proactive [facility] Encounters (DENEEN)  topics  Updates were requested from care everywhere  Health Maintenance has been updated  LINKS immunization registry triggered  Mammogram ordered and tasked

## 2022-02-09 ENCOUNTER — TELEPHONE (OUTPATIENT)
Dept: OPHTHALMOLOGY | Facility: CLINIC | Age: 62
End: 2022-02-09
Payer: MEDICARE

## 2022-02-09 PROBLEM — E11.3313 TYPE 2 DIABETES MELLITUS WITH BOTH EYES AFFECTED BY MODERATE NONPROLIFERATIVE RETINOPATHY AND MACULAR EDEMA, WITH LONG-TERM CURRENT USE OF INSULIN: Status: ACTIVE | Noted: 2019-08-07

## 2022-02-09 PROBLEM — H25.813 COMBINED FORMS OF AGE-RELATED CATARACT, BILATERAL: Status: ACTIVE | Noted: 2022-02-09

## 2022-02-09 PROBLEM — H40.053 OCULAR HYPERTENSION, BILATERAL: Status: ACTIVE | Noted: 2022-02-09

## 2022-02-09 NOTE — TELEPHONE ENCOUNTER
Pt missed appt on 2/9/22n I called to get pt r/s to open spot on 3/2/22 @ 1 o'clock but there was no answer and pt vm was not setup to leave a msg.

## 2022-02-10 ENCOUNTER — DOCUMENT SCAN (OUTPATIENT)
Dept: HOME HEALTH SERVICES | Facility: HOSPITAL | Age: 62
End: 2022-02-10
Payer: MEDICARE

## 2022-02-15 ENCOUNTER — TELEPHONE (OUTPATIENT)
Dept: FAMILY MEDICINE | Facility: CLINIC | Age: 62
End: 2022-02-15
Payer: MEDICARE

## 2022-02-16 ENCOUNTER — DOCUMENT SCAN (OUTPATIENT)
Dept: HOME HEALTH SERVICES | Facility: HOSPITAL | Age: 62
End: 2022-02-16
Payer: MEDICARE

## 2022-02-18 ENCOUNTER — OUTPATIENT CASE MANAGEMENT (OUTPATIENT)
Dept: ADMINISTRATIVE | Facility: OTHER | Age: 62
End: 2022-02-18
Payer: MEDICARE

## 2022-02-18 NOTE — PROGRESS NOTES
2/25/22  Attempt assessment with patient for outpatient case management. No answer, no VM available for leaving a message. RN OPCM 3rd assessment attempt. Letter has been sent via [employer]. Message sent to  notifying unable to reach patient for enrollment assessment. CASE CLOSURE.  Brooke Serra RN    2/24/22  1655 Attempt assessment with patient for outpatient case management. No answer, no VM available for leaving a message. RN OPCM 2nd assessment attempt. Letter sent via [employer].  Brooke Serra RN    1144 Attempt assessment with patient for outpatient case management. Spoke briefly with , who states that she is not available to speak about OPCM program at this time, but request that RN case  her back at 3PM this afternoon 2/24/22. RN   Brooke Serra RN    2/18/22  Attempt assessment with patient for outpatient case management. No answer, no VM available for leaving a message. RN OPCM 1st assessment attempt.  Brooke Serra RN

## 2022-02-18 NOTE — LETTER
February 24, 2022    Steff Johnson  [address] [facility] [place] [address]             [facility] [address] [place] [address] Dear Ms. Johnson:    I work with [facility]s outpatient case management department. We received a referral to call you to discuss your medical history. I attempted to reach you by telephone, but I was unsuccessful. Please call our department that we may go over some questions with you regarding your health. My phone number is [phone]. This is a FREE program from [facility].     The outpatient case management department can be reached at [phone] from 8:00 am to 4:30 PM on Monday thru Friday. [facility] also has a program where a nurse is available 24/7 to answer questions or provide medical advice their number is [phone].     If you have any questions or concerns, please don't hesitate to call.    Sincerely,        Oanh Serra RN

## 2022-03-16 ENCOUNTER — PATIENT OUTREACH (OUTPATIENT)
Dept: ADMINISTRATIVE | Facility: HOSPITAL | Age: 62
End: 2022-03-16
Payer: MEDICARE

## 2022-03-16 NOTE — PROGRESS NOTES
"Called patient. No answer. Unable to leave message as recording says "this call can not be completed because there are restrictions on this line".   Overdue  outreach via mail out.    Health Maintenance Due   Topic Date Due    TETANUS VACCINE  Never done    Shingles Vaccine (1 of 2) Never done    Diabetes Urine Screening  07/03/2020    Mammogram  03/09/2021    Influenza Vaccine (1) 09/01/2021    Foot Exam  12/03/2021    COVID-19 Vaccine (3 - Booster for Pfizer series) 01/24/2022    Eye Exam  03/29/2022    Colorectal Cancer Screening  04/11/2022       "

## 2022-03-16 NOTE — LETTER
March 16, 2022    Steff Johnson  [address] [place] [address]             [facility]  [address]  [facility] [address]  Phone: [phone] Hi,  I hope you are well. According to our records you are due for:    Health Maintenance Due   Topic Date Due    TETANUS VACCINE  Never done    Shingles Vaccine (1 of 2) Never done    Diabetes Urine Screening  07/03/2020    Mammogram  03/09/2021    Influenza Vaccine (1) 09/01/2021    Foot Exam  12/03/2021    COVID-19 Vaccine (3 - Booster for Pfizer series) 01/24/2022    Eye Exam  03/29/2022    Colorectal Cancer Screening  04/11/2022     If you have had this done elsewhere, will you please let us know so that we may request a copy of your report and update your record here at [facility]. If you have not, will you please give us a call at [phone] and we will be happy to help you get this scheduled.     Thanks and have a good day!    Manish Baker LPN Clinical Care Coordinator  [facility] [place] [address]  [place] [id number]  [phone] (phone)  [phone] (fax)

## 2022-03-31 RX ORDER — LANCETS 33 GAUGE
100 EACH MISCELLANEOUS 2 TIMES DAILY
Qty: 100 EACH | Refills: 3 | Status: SHIPPED | OUTPATIENT
Start: 2022-03-31 | End: 2022-06-29

## 2022-03-31 RX ORDER — ISOPROPYL ALCOHOL 70 ML/100ML
1 SWAB TOPICAL 2 TIMES DAILY
Qty: 180 EACH | Refills: 0 | Status: SHIPPED | OUTPATIENT
Start: 2022-03-31 | End: 2022-05-18

## 2022-03-31 RX ORDER — LANCING DEVICE
1 EACH MISCELLANEOUS 2 TIMES DAILY
Qty: 1 EACH | Refills: 3 | Status: SHIPPED | OUTPATIENT
Start: 2022-03-31 | End: 2022-05-18

## 2022-04-11 ENCOUNTER — TELEPHONE (OUTPATIENT)
Dept: FAMILY MEDICINE | Facility: CLINIC | Age: 62
End: 2022-04-11
Payer: MEDICARE

## 2022-04-13 ENCOUNTER — PES CALL (OUTPATIENT)
Dept: ADMINISTRATIVE | Facility: CLINIC | Age: 62
End: 2022-04-13
Payer: MEDICARE

## 2022-04-22 NOTE — PROGRESS NOTES
Past Medical History:   Diagnosis Date    Arthritis     COPD (chronic obstructive pulmonary disease)     Diabetes mellitus     Hypertension     Wears glasses        Past Surgical History:   Procedure Laterality Date    COLONOSCOPY N/A 4/11/2017    Procedure: COLONOSCOPY;  Surgeon: Jared Antonio MD;  Location: [facility];  Service: Endoscopy;  Laterality: N/A;    HYSTERECTOMY      OOPHORECTOMY      SHOULDER SURGERY      R       Current Outpatient Prescriptions   Medication Sig    amlodipine-valsartan (EXFORGE)  mg per tablet Take 1 tablet by mouth once daily.    blood sugar diagnostic Strp 1 strip four times daily before meals and bedtime. True Metrix or equivalent covered by insurance. Diagnosis: E11.65    blood-glucose meter (TRUE METRIX GLUCOSE METER) Misc 1 each by Misc.(Non-Drug; Combo Route) route As instructed (Diagnosis E11.65).    clotrimazole-betamethasone 1-0.05% (LOTRISONE) cream Apply topically 2 (two) times daily.    gabapentin (NEURONTIN) 300 MG capsule TK 1 C PO HS    hydrALAZINE (APRESOLINE) 50 MG tablet Take 2 tablets (100 mg total) by mouth every 12 (twelve) hours. (Patient taking differently: Take 100 mg by mouth every 12 (twelve) hours. TAKES AM ONLY)    insulin degludec 100 unit/mL (3 mL) InPn Inject 60 Units into the skin once daily.    lancets 33 gauge Misc 1 lancet by Misc.(Non-Drug; Combo Route) route 4 (four) times daily before meals and nightly. For True Metrix system. Diagnosis: E11.65    lidocaine-prilocaine (EMLA) cream as needed.     metFORMIN (GLUCOPHAGE-XR) 500 MG 24 hr tablet Take 1 tablet (500 mg total) by mouth 2 (two) times daily with meals. 2 TABLETS TWICE DAILY WITH MEALS- Pt taking 1 tablet twice a day    metoprolol succinate (TOPROL-XL) 200 MG 24 hr tablet Take 1 tablet (200 mg total) by mouth once daily.    oxyCODONE-acetaminophen (PERCOCET) 5-325 mg per tablet Take 1 tablet by mouth every 6 (six) hours as needed for Pain.    pen needle,  "diabetic (PEN NEEDLE) 30 gauge x 5/16" Ndle Inject 2 x /day    promethazine (PHENERGAN) 25 MG tablet Take 1 tablet (25 mg total) by mouth every 8 (eight) hours as needed for Nausea.    spironolactone (ALDACTONE) 25 MG tablet Take 1 tablet (25 mg total) by mouth every morning.    tiotropium (SPIRIVA WITH HANDIHALER) 18 mcg inhalation capsule Inhale 1 capsule (18 mcg total) into the lungs once daily.    tiZANidine (ZANAFLEX) 4 MG tablet TAKE 1 TABLET(4 MG) BY MOUTH EVERY 6 HOURS AS NEEDED FOR MUSCLE SPASM     No current facility-administered medications for this visit.        Review of patient's allergies indicates:  No Known Allergies    Family History   Problem Relation Age of Onset    Diabetes Mother     Asthma Mother     Hypertension Mother     Diabetes Father     Diabetes Brother     Hypertension Brother     Anemia Daughter        Social History     Social History    Marital status: Single     Spouse name: N/A    Number of children: N/A    Years of education: N/A     Occupational History          disabled due to shoulder problems     Social History Main Topics    Smoking status: Never Smoker    Smokeless tobacco: Never Used    Alcohol use No    Drug use: No    Sexual activity: Not Currently     Other Topics Concern    Not on file     Social History Narrative    No narrative on file       Chief Complaint:   Chief Complaint   Patient presents with    Post-op Evaluation     s/p left shoulder RC Repair 3/16/18        Date of surgery: March 16, 2018    History of present illness: 58-year-old female underwent left rotator cuff repair for a massive retracted rotator cuff tear.  Pain is an 8 out of 10.  Patient had a suture bridge technique performed.  We used suture tape.      Review of Systems:    Musculoskeletal:  See HPI        Physical Examination:    Vital Signs:    There were no vitals filed for this visit.    Body mass index is 27.87 kg/m².    This a well-developed, well nourished patient " in no acute distress.  They are alert and oriented and cooperative to examination.  Pt. walks without an antalgic gait.      Examination left shoulder shows healed surgical portals.  No erythema or drainage.  No bruising.  Fairly stiff and irritable.    X-rays: None     Assessment:: Status post suture bridge rotator cuff repair for massive tear    Plan: The patient is pretty stiff.  She needs to stop using the sling so much.  We will start some occupational therapy.  Follow-up in 6 weeks.      This note was created using Dragon voice recognition software that occasionally misinterpreted phrases or words.     B positive

## 2022-05-04 ENCOUNTER — PATIENT OUTREACH (OUTPATIENT)
Dept: ADMINISTRATIVE | Facility: HOSPITAL | Age: 62
End: 2022-05-04
Payer: MEDICARE

## 2022-05-04 ENCOUNTER — PATIENT MESSAGE (OUTPATIENT)
Dept: ADMINISTRATIVE | Facility: HOSPITAL | Age: 62
End: 2022-05-04
Payer: MEDICARE

## 2022-05-04 NOTE — PROGRESS NOTES
Called patient. No answer. Left message along with contact information to please call back regarding scheduling/ overdue  diabetic labs. Patient portal message sent regarding same.

## 2022-05-09 ENCOUNTER — PATIENT OUTREACH (OUTPATIENT)
Dept: ADMINISTRATIVE | Facility: OTHER | Age: 62
End: 2022-05-09
Payer: MEDICARE

## 2022-05-09 NOTE — PROGRESS NOTES
Chart was reviewed for overdue Proactive [facility] Encounters (DENEEN)  topics  Updates were requested from care everywhere  Health Maintenance has been updated  LINKS immunization registry triggered  A1C previously ordered

## 2022-05-12 ENCOUNTER — PATIENT OUTREACH (OUTPATIENT)
Dept: ADMINISTRATIVE | Facility: HOSPITAL | Age: 62
End: 2022-05-12
Payer: MEDICARE

## 2022-05-12 NOTE — PROGRESS NOTES
"Attempted to outreach patient regarding overdue/ due HM via "[employer]". Unable to reach by phone or patient portal. Now sending outreach via mail out letter.    "

## 2022-05-12 NOTE — LETTER
May 12, 2022    Steff TIERNEY Alex  [address]  [place] [address]             [facility]  [address]  [facility] [address]  Phone: [phone] Hi Ms. Artis,  I hope you are well. I was unable to reach you by phone. I am reaching out to ask you to please give us a call at [phone] so we may assist you in scheduling diabetic lab testing you are due for. This would include a Hgb A1C and Urine Microalbumin. Thanks in advance and I hope you have a good day!.     Manish Baker LPN Clinical Care Coordinator  [facility] [place] [address]  [place] [id number]  [phone] (phone)  [phone] (fax)

## 2022-05-18 ENCOUNTER — HOSPITAL ENCOUNTER (OUTPATIENT)
Facility: HOSPITAL | Age: 62
Discharge: HOME | End: 2022-05-20
Attending: EMERGENCY MEDICINE | Admitting: INTERNAL MEDICINE
Payer: MEDICARE

## 2022-05-18 DIAGNOSIS — H53.8 BLURRY VISION: ICD-10-CM

## 2022-05-18 DIAGNOSIS — I63.9 STROKE: ICD-10-CM

## 2022-05-18 DIAGNOSIS — I16.1 HYPERTENSIVE EMERGENCY: Primary | ICD-10-CM

## 2022-05-18 LAB
ALBUMIN SERPL BCP-MCNC: 3.8 G/DL (ref 3.5–5.2)
ALP SERPL-CCNC: 69 U/L (ref 55–135)
ALT SERPL W/O P-5'-P-CCNC: 30 U/L (ref 10–44)
ANION GAP SERPL CALC-SCNC: 10 MMOL/L (ref 8–16)
AST SERPL-CCNC: 24 U/L (ref 10–40)
BASOPHILS # BLD AUTO: 0.07 K/UL (ref 0–0.2)
BASOPHILS NFR BLD: 0.6 % (ref 0–1.9)
BILIRUB SERPL-MCNC: 0.6 MG/DL (ref 0.1–1)
BNP SERPL-MCNC: 32 PG/ML (ref 0–99)
BUN SERPL-MCNC: 23 MG/DL (ref 8–23)
CALCIUM SERPL-MCNC: 8.9 MG/DL (ref 8.7–10.5)
CHLORIDE SERPL-SCNC: 104 MMOL/L (ref 95–110)
CHOLEST SERPL-MCNC: 192 MG/DL (ref 120–199)
CHOLEST/HDLC SERPL: 2 {RATIO} (ref 2–5)
CO2 SERPL-SCNC: 22 MMOL/L (ref 23–29)
CREAT SERPL-MCNC: 0.9 MG/DL (ref 0.5–1.4)
CREAT SERPL-MCNC: 1 MG/DL (ref 0.5–1.4)
DIFFERENTIAL METHOD: ABNORMAL
EOSINOPHIL # BLD AUTO: 0.2 K/UL (ref 0–0.5)
EOSINOPHIL NFR BLD: 1.4 % (ref 0–8)
ERYTHROCYTE [DISTWIDTH] IN BLOOD BY AUTOMATED COUNT: 14.1 % (ref 11.5–14.5)
EST. GFR  (AFRICAN AMERICAN): >60 ML/MIN/1.73 M^2
EST. GFR  (NON AFRICAN AMERICAN): >60 ML/MIN/1.73 M^2
GLUCOSE SERPL-MCNC: 147 MG/DL (ref 70–110)
GLUCOSE SERPL-MCNC: 173 MG/DL (ref 70–110)
GLUCOSE SERPL-MCNC: 179 MG/DL (ref 70–110)
HCT VFR BLD AUTO: 38.2 % (ref 37–48.5)
HDLC SERPL-MCNC: 95 MG/DL (ref 40–75)
HDLC SERPL: 49.5 % (ref 20–50)
HGB BLD-MCNC: 13.2 G/DL (ref 12–16)
IMM GRANULOCYTES # BLD AUTO: 0.07 K/UL (ref 0–0.04)
IMM GRANULOCYTES NFR BLD AUTO: 0.6 % (ref 0–0.5)
INR PPP: 1.1
LDLC SERPL CALC-MCNC: 87.6 MG/DL (ref 63–159)
LYMPHOCYTES # BLD AUTO: 3.3 K/UL (ref 1–4.8)
LYMPHOCYTES NFR BLD: 29.8 % (ref 18–48)
MCH RBC QN AUTO: 27.8 PG (ref 27–31)
MCHC RBC AUTO-ENTMCNC: 34.6 G/DL (ref 32–36)
MCV RBC AUTO: 80 FL (ref 82–98)
MONOCYTES # BLD AUTO: 0.9 K/UL (ref 0.3–1)
MONOCYTES NFR BLD: 8 % (ref 4–15)
NEUTROPHILS # BLD AUTO: 6.6 K/UL (ref 1.8–7.7)
NEUTROPHILS NFR BLD: 59.6 % (ref 38–73)
NONHDLC SERPL-MCNC: 97 MG/DL
NRBC BLD-RTO: 0 /100 WBC
PLATELET # BLD AUTO: 426 K/UL (ref 150–450)
PMV BLD AUTO: 10.3 FL (ref 9.2–12.9)
POTASSIUM SERPL-SCNC: 4.2 MMOL/L (ref 3.5–5.1)
PROT SERPL-MCNC: 7.6 G/DL (ref 6–8.4)
PROTHROMBIN TIME: 13.4 SEC (ref 11.4–13.7)
RBC # BLD AUTO: 4.75 M/UL (ref 4–5.4)
SAMPLE: NORMAL
SARS-COV-2 RDRP RESP QL NAA+PROBE: NEGATIVE
SODIUM SERPL-SCNC: 136 MMOL/L (ref 136–145)
TRIGL SERPL-MCNC: 47 MG/DL (ref 30–150)
TROPONIN I SERPL DL<=0.01 NG/ML-MCNC: <0.03 NG/ML
TSH SERPL DL<=0.005 MIU/L-ACNC: 1.39 UIU/ML (ref 0.34–5.6)
WBC # BLD AUTO: 11.01 K/UL (ref 3.9–12.7)

## 2022-05-18 PROCEDURE — 96366 THER/PROPH/DIAG IV INF ADDON: CPT

## 2022-05-18 PROCEDURE — 99900035 HC TECH TIME PER 15 MIN (STAT)

## 2022-05-18 PROCEDURE — 85025 COMPLETE CBC W/AUTO DIFF WBC: CPT | Performed by: EMERGENCY MEDICINE

## 2022-05-18 PROCEDURE — 82962 GLUCOSE BLOOD TEST: CPT

## 2022-05-18 PROCEDURE — 99291 CRITICAL CARE FIRST HOUR: CPT

## 2022-05-18 PROCEDURE — 83880 ASSAY OF NATRIURETIC PEPTIDE: CPT | Performed by: EMERGENCY MEDICINE

## 2022-05-18 PROCEDURE — 36415 COLL VENOUS BLD VENIPUNCTURE: CPT | Performed by: NURSE PRACTITIONER

## 2022-05-18 PROCEDURE — 93005 ELECTROCARDIOGRAM TRACING: CPT | Performed by: SPECIALIST

## 2022-05-18 PROCEDURE — G0378 HOSPITAL OBSERVATION PER HR: HCPCS

## 2022-05-18 PROCEDURE — 63600175 PHARM REV CODE 636 W HCPCS: Performed by: NURSE PRACTITIONER

## 2022-05-18 PROCEDURE — 80061 LIPID PANEL: CPT | Performed by: EMERGENCY MEDICINE

## 2022-05-18 PROCEDURE — U0002 COVID-19 LAB TEST NON-CDC: HCPCS | Performed by: EMERGENCY MEDICINE

## 2022-05-18 PROCEDURE — 25500020 PHARM REV CODE 255: Performed by: EMERGENCY MEDICINE

## 2022-05-18 PROCEDURE — 84443 ASSAY THYROID STIM HORMONE: CPT | Performed by: EMERGENCY MEDICINE

## 2022-05-18 PROCEDURE — 25000003 PHARM REV CODE 250: Performed by: EMERGENCY MEDICINE

## 2022-05-18 PROCEDURE — 85610 PROTHROMBIN TIME: CPT | Performed by: EMERGENCY MEDICINE

## 2022-05-18 PROCEDURE — 83036 HEMOGLOBIN GLYCOSYLATED A1C: CPT | Performed by: NURSE PRACTITIONER

## 2022-05-18 PROCEDURE — 96365 THER/PROPH/DIAG IV INF INIT: CPT

## 2022-05-18 PROCEDURE — 94761 N-INVAS EAR/PLS OXIMETRY MLT: CPT

## 2022-05-18 PROCEDURE — 80053 COMPREHEN METABOLIC PANEL: CPT | Performed by: EMERGENCY MEDICINE

## 2022-05-18 PROCEDURE — 84484 ASSAY OF TROPONIN QUANT: CPT | Performed by: EMERGENCY MEDICINE

## 2022-05-18 PROCEDURE — 94799 UNLISTED PULMONARY SVC/PX: CPT

## 2022-05-18 PROCEDURE — 93010 ELECTROCARDIOGRAM REPORT: CPT | Mod: ,,, | Performed by: SPECIALIST

## 2022-05-18 PROCEDURE — 96372 THER/PROPH/DIAG INJ SC/IM: CPT | Mod: 59 | Performed by: NURSE PRACTITIONER

## 2022-05-18 PROCEDURE — 93010 EKG 12-LEAD: ICD-10-PCS | Mod: ,,, | Performed by: SPECIALIST

## 2022-05-18 RX ORDER — METOPROLOL SUCCINATE 50 MG/1
100 TABLET, EXTENDED RELEASE ORAL DAILY
Status: DISCONTINUED | OUTPATIENT
Start: 2022-05-19 | End: 2022-05-20 | Stop reason: HOSPADM

## 2022-05-18 RX ORDER — NICARDIPINE HYDROCHLORIDE 0.2 MG/ML
0-15 INJECTION INTRAVENOUS CONTINUOUS
Status: DISCONTINUED | OUTPATIENT
Start: 2022-05-18 | End: 2022-05-19

## 2022-05-18 RX ORDER — GLUCAGON 1 MG
1 KIT INJECTION
Status: DISCONTINUED | OUTPATIENT
Start: 2022-05-18 | End: 2022-05-20 | Stop reason: HOSPADM

## 2022-05-18 RX ORDER — NICARDIPINE HYDROCHLORIDE 0.2 MG/ML
0-15 INJECTION INTRAVENOUS CONTINUOUS
Status: DISCONTINUED | OUTPATIENT
Start: 2022-05-18 | End: 2022-05-18

## 2022-05-18 RX ORDER — ENOXAPARIN SODIUM 100 MG/ML
40 INJECTION SUBCUTANEOUS EVERY 24 HOURS
Status: DISCONTINUED | OUTPATIENT
Start: 2022-05-18 | End: 2022-05-20 | Stop reason: HOSPADM

## 2022-05-18 RX ORDER — SODIUM CHLORIDE 0.9 % (FLUSH) 0.9 %
10 SYRINGE (ML) INJECTION
Status: DISCONTINUED | OUTPATIENT
Start: 2022-05-18 | End: 2022-05-20 | Stop reason: HOSPADM

## 2022-05-18 RX ORDER — ERGOCALCIFEROL 1.25 MG/1
50000 CAPSULE ORAL
COMMUNITY
End: 2022-11-09 | Stop reason: SDUPTHER

## 2022-05-18 RX ORDER — POLYETHYLENE GLYCOL 3350 17 G/17G
17 POWDER, FOR SOLUTION ORAL DAILY
Status: DISCONTINUED | OUTPATIENT
Start: 2022-05-19 | End: 2022-05-20 | Stop reason: HOSPADM

## 2022-05-18 RX ORDER — IBUPROFEN 200 MG
24 TABLET ORAL
Status: DISCONTINUED | OUTPATIENT
Start: 2022-05-18 | End: 2022-05-20 | Stop reason: HOSPADM

## 2022-05-18 RX ORDER — IBUPROFEN 200 MG
16 TABLET ORAL
Status: DISCONTINUED | OUTPATIENT
Start: 2022-05-18 | End: 2022-05-20 | Stop reason: HOSPADM

## 2022-05-18 RX ORDER — NAPROXEN SODIUM 220 MG/1
81 TABLET, FILM COATED ORAL DAILY
Status: DISCONTINUED | OUTPATIENT
Start: 2022-05-19 | End: 2022-05-18

## 2022-05-18 RX ORDER — ATORVASTATIN CALCIUM 40 MG/1
40 TABLET, FILM COATED ORAL DAILY
Status: DISCONTINUED | OUTPATIENT
Start: 2022-05-19 | End: 2022-05-18

## 2022-05-18 RX ORDER — INSULIN ASPART 100 [IU]/ML
0-5 INJECTION, SOLUTION INTRAVENOUS; SUBCUTANEOUS
Status: DISCONTINUED | OUTPATIENT
Start: 2022-05-18 | End: 2022-05-20 | Stop reason: HOSPADM

## 2022-05-18 RX ORDER — ASPIRIN 81 MG/1
81 TABLET ORAL DAILY
Status: DISCONTINUED | OUTPATIENT
Start: 2022-05-19 | End: 2022-05-20 | Stop reason: HOSPADM

## 2022-05-18 RX ORDER — UMECLIDINIUM 62.5 UG/1
62.5 AEROSOL, POWDER ORAL DAILY
COMMUNITY
End: 2022-11-09 | Stop reason: SDUPTHER

## 2022-05-18 RX ORDER — ATORVASTATIN CALCIUM 40 MG/1
40 TABLET, FILM COATED ORAL DAILY
Status: DISCONTINUED | OUTPATIENT
Start: 2022-05-19 | End: 2022-05-20 | Stop reason: HOSPADM

## 2022-05-18 RX ORDER — ASPIRIN 325 MG
325 TABLET ORAL
Status: COMPLETED | OUTPATIENT
Start: 2022-05-18 | End: 2022-05-18

## 2022-05-18 RX ADMIN — ASPIRIN 325 MG ORAL TABLET 325 MG: 325 PILL ORAL at 07:05

## 2022-05-18 RX ADMIN — NICARDIPINE HYDROCHLORIDE 5 MG/HR: 0.2 INJECTION INTRAVENOUS at 05:05

## 2022-05-18 RX ADMIN — NICARDIPINE HYDROCHLORIDE 5 MG/HR: 0.2 INJECTION INTRAVENOUS at 10:05

## 2022-05-18 RX ADMIN — ENOXAPARIN SODIUM 40 MG: 40 INJECTION SUBCUTANEOUS at 10:05

## 2022-05-18 RX ADMIN — IOHEXOL 75 ML: 350 INJECTION, SOLUTION INTRAVENOUS at 05:05

## 2022-05-18 NOTE — ED PROVIDER NOTES
Encounter Date: 5/18/2022       History     Chief Complaint   Patient presents with    Blurred Vision     Patient reports blurry vision in R eye and R sided weakness since yesterday around 0900      62-year-old female presented emergency department with complaints of right-sided weakness and blurry vision.  Patient went to bed the night before and woke up yesterday morning with these symptoms.  Patient went to bed at 10:00 p.m. the night before.  Patient woke up at 8:00 a.m. yesterday morning and had double vision and blurry vision mostly in the right eye.  Patient has right upper and lower extremity weakness also noticed at the same time.  Patient denies fever or chills.  Patient has history of diabetes and hypertension.  Patient denies any drug use.  Patient noted to be extremely hypertensive upon arrival.  Patient denies chest pain or shortness of breath or dysuria or hematuria.        Review of patient's allergies indicates:  No Known Allergies  Past Medical History:   Diagnosis Date    Arthritis     Complete tear of left rotator cuff 11/09/2017    COPD (chronic obstructive pulmonary disease)     COVID-19 infection 07/02/2021    Diabetes mellitus     H/o Cerebrovascular accident (CVA) of left pontine structure 12/12/2019    Hypertension     Personal history of colonic polyps     Stroke     Wears glasses      Past Surgical History:   Procedure Laterality Date    COLONOSCOPY N/A 4/11/2017    Procedure: COLONOSCOPY;  Surgeon: Jared Antonio MD;  Location: [facility];  Service: Endoscopy;  Laterality: N/A;    HYSTERECTOMY      OOPHORECTOMY      SHOULDER SURGERY      R     Family History   Problem Relation Age of Onset    Diabetes Mother     Asthma Mother     Hypertension Mother     Diabetes Father     Diabetes Brother     Hypertension Brother     Anemia Daughter     Glaucoma Neg Hx     Macular degeneration Neg Hx     Retinal detachment Neg Hx      Social History     Tobacco Use    Smoking  status: Never Smoker    Smokeless tobacco: Never Used   Substance Use Topics    Alcohol use: No    Drug use: No     Review of Systems   Constitutional: Negative.    HENT: Negative.    Eyes: Positive for visual disturbance.        Double vision   Respiratory: Negative.    Cardiovascular: Negative for chest pain.   Gastrointestinal: Negative.    Endocrine: Negative.    Genitourinary: Negative.    Musculoskeletal: Negative.    Skin: Negative.    Allergic/Immunologic: Negative.    Neurological: Positive for weakness.        Right-sided weakness with upper and lower extremity weakness   Hematological: Negative.    Psychiatric/Behavioral: Negative.    All other systems reviewed and are negative.      Physical Exam     Initial Vitals [05/18/22 1703]   BP Pulse Resp Temp SpO2   (!) 245/112 76 18 97.9 °F (36.6 °C) 100 %      MAP       --         Physical Exam    Nursing note and vitals reviewed.  Constitutional: She appears well-developed and well-nourished.   HENT:   Head: Normocephalic and atraumatic.   Nose: Nose normal.   Mouth/Throat: Oropharynx is clear and moist.   Eyes: Conjunctivae are normal. Pupils are equal, round, and reactive to light.   Neck: Neck supple. No thyromegaly present. No tracheal deviation present. No JVD present.   Normal range of motion.  Cardiovascular: Normal rate, regular rhythm, normal heart sounds and intact distal pulses.   No murmur heard.  Pulmonary/Chest: Breath sounds normal. No stridor. No respiratory distress. She has no wheezes. She has no rales.   Abdominal: Abdomen is soft. Bowel sounds are normal. She exhibits no distension. There is no abdominal tenderness.   Musculoskeletal:         General: No edema. Normal range of motion.      Cervical back: Normal range of motion and neck supple.     Neurological: She is alert and oriented to person, place, and time. She displays normal reflexes. No sensory deficit. GCS score is 15. GCS eye subscore is 4. GCS verbal subscore is 5. GCS  motor subscore is 6.   Right upper and lower extremity weakness noted.  Pronator drift noted of right upper extremity.  Right leg has 4/5 strength.  Speech within normal limits   Skin: Skin is warm. Capillary refill takes less than 2 seconds.   Psychiatric: She has a normal mood and affect. Thought content normal.         ED Course   Critical Care    Date/Time: 5/18/2022 7:17 PM  Performed by: Jatinder Parra MD  Authorized by: Jatinder Parra MD   Direct patient critical care time: 25 minutes  Ordering / reviewing critical care time: 5 minutes  Documentation critical care time: 5 minutes  Consulting other physicians critical care time: 5 minutes  Total critical care time (exclusive of procedural time) : 40 minutes        Labs Reviewed   COMPREHENSIVE METABOLIC PANEL - Abnormal; Notable for the following components:       Result Value    CO2 22 (*)     Glucose 173 (*)     All other components within normal limits   LIPID PANEL - Abnormal; Notable for the following components:    HDL 95 (*)     All other components within normal limits   PROTIME-INR   TSH   SARS-COV-2 RNA AMPLIFICATION, QUAL   TROPONIN I   CBC W/ AUTO DIFFERENTIAL   B-TYPE NATRIURETIC PEPTIDE   DRUG SCREEN PANEL, URINE EMERGENCY   ISTAT CREATININE   POCT CREATININE   POCT GLUCOSE MONITORING CONTINUOUS     EKG Readings: (Independently Interpreted)   Initial Reading: No STEMI. Rhythm: Normal Sinus Rhythm. Ectopy: No Ectopy. Conduction: Normal.       Imaging Results          CTA Head and Neck (xpd) (Final result)  Result time 05/18/22 18:00:44    Final result by Serg Brambila MD (05/18/22 18:00:44)                 Narrative:    CMS MANDATED QUALITY DATA - CT RADIATION 436    All CT scans at this facility utilize dose modulation, iterative reconstruction, and/or weight based dosing when appropriate to reduce radiation dose to as low as reasonably achievable.    CMS MANDATED QUALITY DATA - CAROTID - 195    All measurements and percent stenosis described  below were determined using NASCET criteria or criteria similar to NASCET, as defined by the Society of Radiologists in Ultrasound Consensus Conference, Radiology, 2003    CLINICAL HISTORY:  62 years (1960) Female Stroke, hemorrhagic    TECHNIQUE:  CT HEAD NECK ANGIOGRAPHY WITH IV CONTRAST. 428 images obtained. CT angiography of the head and neck was performed using thin axial slices respectively and reviewed in multiple planes. Two and three dimensional multiplanar reformatted images were generated and submitted to PACS.    POST PROCESSING:  (Vitrea) 3D advanced post-processing was performed by the interpreting physician using an independent workstation utilizing a combination of MIP and MPR techniques to better evaluate anatomy and disease process.  3D rendering was performed with physician participation and supervision.    CONTRAST:  100  mL Omni 350 was injected intravenously.    COMPARISON:  None available.    FINDINGS:  CTA of the [place]: There is patency of the intracranial circulation including the bilateral internal carotid arteries, the carotid terminus, the A1 and M1 segments, the bilateral intracranial vertebral arteries, the basilar artery and its distal branches. The posterior communicating arteries are patent. No aneurysm is identified within the limits of the technique. Conventional angiography is more sensitive for the detection of small aneurysms.    CTA of the Neck:  There is a three-vessel aortic arch. CTA of the neck demonstrates that the origins of the great vessels are widely patent. No aneurysm is seen. The carotid arteries are tortuous and the distal common carotid, carotid bulb and proximal internal carotid arteries course behind the oropharynx.    RIGHT:  Common carotid artery origin: Patent.  Cervical segment: Patent.  External carotid origin characteristics: Patent.  Carotid bifurcation: Patent.  Internal carotid origin characteristics: No focal stenosis.  Vertebral artery:  within normal limits.    LEFT  Common carotid artery origin: Patent.  Cervical segment: Patent.  External carotid origin characteristics: Patent.  Carotid bifurcation: Patent.  Internal carotid origin characteristics: No focal stenosis.  Vertebral artery: within normal limits.    IMPRESSION:  No clinically significant stenosis, large vessel occlusion or aneurysm is identified as outlined above.                    .    Electronically signed by:  Serg Brambila MD  5/18/2022 6:00 PM CDT Workstation: [id number]                             CT Head Without Contrast (Final result)  Result time 05/18/22 17:57:26    Final result by Serg Brambila MD (05/18/22 17:57:26)                 Narrative:    CMS MANDATED QUALITY DATA - CT RADIATION 436    All CT scans at this facility utilize dose modulation, iterative reconstruction, and/or weight based dosing when appropriate to reduce radiation dose to as low as reasonably achievable.    CLINICAL HISTORY:  62 years (1960) Female Neuro deficit, acute, stroke suspected    TECHNIQUE:  CT HEAD WITHOUT IV CONTRAST. 106 images obtained. Axial CT of the brain without contrast using soft tissue and bone algorithm. Please note in the acute setting if there is a clinical concern for an acute stroke MRI would be more sensitive/specific for evaluation of ischemia.    COMPARISON:  Most recent CT from January 6, 2022.    FINDINGS:  No acute intracranial hemorrhage, hydrocephalus, herniation or midline shift and the basal and suprasellar cisterns are patent. No acute skull fracture is identified.    Mild diffuse cerebral atrophy for age with periventricular deep cerebral white matter low attenuation, a nonspecific finding in this age group which can be seen in any diffuse white matter process but which is most commonly associated with chronic microvascular ischemic disease. There are tiny rounded defects in the basal ganglia compatible with prior lacunar infarcts. There are scattered  atheromatous calcifications in the intracranial internal carotid arteries.    Orbital contents appear within normal limits. External auditory canals are unremarkable. The visualized paranasal sinuses and mastoid air cells are essentially clear.    IMPRESSION:  1. No acute intracranial process.  2. Chronic/involutional findings as noted above.                  .    Electronically signed by:  Serg Brambila MD  5/18/2022 5:57 PM CDT Workstation: [id number]                             X-Ray Chest AP Portable (Final result)  Result time 05/18/22 17:36:45    Final result by Serg Brambila MD (05/18/22 17:36:45)                 Narrative:    CLINICAL HISTORY:  62 years (1960) Female CHF chf    TECHNIQUE:  Portable AP radiograph the chest.    COMPARISON:  Radiograph from July 2, 2021    FINDINGS:  The lungs are clear. Costophrenic angles are seen without effusion. No pneumothorax is identified. The heart is normal in size. The mediastinum is within normal limits. Osseous structures show degenerative disc disease and degenerative changes in the shoulders. The visualized upper abdomen is unremarkable.    IMPRESSION:  No acute cardiac or pulmonary process.                  .            Electronically signed by:  Serg Brambila MD  5/18/2022 5:36 PM CDT Workstation: [id number]                               Medications   niCARdipine 40 mg/200 mL (0.2 mg/mL) infusion (10 mg/hr Intravenous Rate/Dose Change 5/18/22 1817)   iohexoL (OMNIPAQUE 350) injection 75 mL (75 mLs Intravenous Given 5/18/22 1743)   aspirin tablet 325 mg (325 mg Oral Given 5/18/22 1900)     Medical Decision Making:   Differential Diagnosis:   Patient presented emergency department with right-sided weakness and blurry vision.  Patient does have strabismus and right sided weakness.  Symptoms did improve after patient returned from CT scan and weakness slightly improved.  Patient's blood pressure improved.  Patient has no evidence of acute stroke on  the initial CT however there is a location that is suspicious to the neurologist so will further evaluate with MRI.  Patient out of window for tPA so tPA not given after discussion with neurologist.  Hospital Medicine consulted for evaluation for admission and further management.  Clinical Tests:   Lab Tests: Reviewed  Radiological Study: Reviewed  Medical Tests: Reviewed                      Clinical Impression:   Final diagnoses:  [I63.9] Stroke  [I16.1] Hypertensive emergency (Primary)  [H53.8] Blurry vision          ED Disposition Condition    Admit               Jatinder Parra MD  05/18/22 1919       Jatinder Parra MD  05/18/22 1943

## 2022-05-18 NOTE — ED NOTES
Bed: 02A Trauma  Expected date:   Expected time:   Means of arrival:   Comments:  EMS BLINDNESS/ LEG NUMBNESS

## 2022-05-19 ENCOUNTER — CLINICAL SUPPORT (OUTPATIENT)
Dept: CARDIOLOGY | Facility: HOSPITAL | Age: 62
End: 2022-05-19
Payer: MEDICARE

## 2022-05-19 LAB
ALBUMIN SERPL BCP-MCNC: 3.9 G/DL (ref 3.5–5.2)
ALP SERPL-CCNC: 66 U/L (ref 55–135)
ALT SERPL W/O P-5'-P-CCNC: 25 U/L (ref 10–44)
ANION GAP SERPL CALC-SCNC: 12 MMOL/L (ref 8–16)
AST SERPL-CCNC: 20 U/L (ref 10–40)
BASOPHILS # BLD AUTO: 0.06 K/UL (ref 0–0.2)
BASOPHILS NFR BLD: 0.5 % (ref 0–1.9)
BILIRUB SERPL-MCNC: 0.9 MG/DL (ref 0.1–1)
BUN SERPL-MCNC: 22 MG/DL (ref 8–23)
CALCIUM SERPL-MCNC: 9 MG/DL (ref 8.7–10.5)
CHLORIDE SERPL-SCNC: 105 MMOL/L (ref 95–110)
CO2 SERPL-SCNC: 22 MMOL/L (ref 23–29)
CREAT SERPL-MCNC: 0.9 MG/DL (ref 0.5–1.4)
DIFFERENTIAL METHOD: ABNORMAL
EOSINOPHIL # BLD AUTO: 0.2 K/UL (ref 0–0.5)
EOSINOPHIL NFR BLD: 1.3 % (ref 0–8)
ERYTHROCYTE [DISTWIDTH] IN BLOOD BY AUTOMATED COUNT: 14 % (ref 11.5–14.5)
EST. GFR  (AFRICAN AMERICAN): >60 ML/MIN/1.73 M^2
EST. GFR  (NON AFRICAN AMERICAN): >60 ML/MIN/1.73 M^2
ESTIMATED AVG GLUCOSE: 160 MG/DL (ref 68–131)
GLUCOSE SERPL-MCNC: 100 MG/DL (ref 70–110)
GLUCOSE SERPL-MCNC: 114 MG/DL (ref 70–110)
GLUCOSE SERPL-MCNC: 152 MG/DL (ref 70–110)
GLUCOSE SERPL-MCNC: 179 MG/DL (ref 70–110)
GLUCOSE SERPL-MCNC: 281 MG/DL (ref 70–110)
HBA1C MFR BLD: 7.2 % (ref 4.5–6.2)
HCT VFR BLD AUTO: 37.9 % (ref 37–48.5)
HGB BLD-MCNC: 13.1 G/DL (ref 12–16)
IMM GRANULOCYTES # BLD AUTO: 0.07 K/UL (ref 0–0.04)
IMM GRANULOCYTES NFR BLD AUTO: 0.6 % (ref 0–0.5)
LYMPHOCYTES # BLD AUTO: 4.2 K/UL (ref 1–4.8)
LYMPHOCYTES NFR BLD: 37.3 % (ref 18–48)
MAGNESIUM SERPL-MCNC: 2 MG/DL (ref 1.6–2.6)
MCH RBC QN AUTO: 27.6 PG (ref 27–31)
MCHC RBC AUTO-ENTMCNC: 34.6 G/DL (ref 32–36)
MCV RBC AUTO: 80 FL (ref 82–98)
MONOCYTES # BLD AUTO: 0.9 K/UL (ref 0.3–1)
MONOCYTES NFR BLD: 8 % (ref 4–15)
NEUTROPHILS # BLD AUTO: 5.9 K/UL (ref 1.8–7.7)
NEUTROPHILS NFR BLD: 52.3 % (ref 38–73)
NRBC BLD-RTO: 0 /100 WBC
PHOSPHATE SERPL-MCNC: 3 MG/DL (ref 2.7–4.5)
PLATELET # BLD AUTO: 444 K/UL (ref 150–450)
PMV BLD AUTO: 10.6 FL (ref 9.2–12.9)
POTASSIUM SERPL-SCNC: 3.8 MMOL/L (ref 3.5–5.1)
PROT SERPL-MCNC: 7.3 G/DL (ref 6–8.4)
RBC # BLD AUTO: 4.74 M/UL (ref 4–5.4)
SODIUM SERPL-SCNC: 139 MMOL/L (ref 136–145)
WBC # BLD AUTO: 11.35 K/UL (ref 3.9–12.7)

## 2022-05-19 PROCEDURE — 94640 AIRWAY INHALATION TREATMENT: CPT

## 2022-05-19 PROCEDURE — 93306 TTE W/DOPPLER COMPLETE: CPT | Mod: 26,,, | Performed by: INTERNAL MEDICINE

## 2022-05-19 PROCEDURE — 97166 OT EVAL MOD COMPLEX 45 MIN: CPT

## 2022-05-19 PROCEDURE — 96366 THER/PROPH/DIAG IV INF ADDON: CPT

## 2022-05-19 PROCEDURE — 97530 THERAPEUTIC ACTIVITIES: CPT

## 2022-05-19 PROCEDURE — 93306 TTE W/DOPPLER COMPLETE: CPT

## 2022-05-19 PROCEDURE — 99900035 HC TECH TIME PER 15 MIN (STAT)

## 2022-05-19 PROCEDURE — C9399 UNCLASSIFIED DRUGS OR BIOLOG: HCPCS | Performed by: NURSE PRACTITIONER

## 2022-05-19 PROCEDURE — 25000003 PHARM REV CODE 250: Performed by: NURSE PRACTITIONER

## 2022-05-19 PROCEDURE — 80053 COMPREHEN METABOLIC PANEL: CPT | Performed by: NURSE PRACTITIONER

## 2022-05-19 PROCEDURE — 97116 GAIT TRAINING THERAPY: CPT

## 2022-05-19 PROCEDURE — 93306 ECHO (CUPID ONLY): ICD-10-PCS | Mod: 26,,, | Performed by: INTERNAL MEDICINE

## 2022-05-19 PROCEDURE — 99900031 HC PATIENT EDUCATION (STAT)

## 2022-05-19 PROCEDURE — 85025 COMPLETE CBC W/AUTO DIFF WBC: CPT | Performed by: NURSE PRACTITIONER

## 2022-05-19 PROCEDURE — 96372 THER/PROPH/DIAG INJ SC/IM: CPT | Performed by: NURSE PRACTITIONER

## 2022-05-19 PROCEDURE — 63600175 PHARM REV CODE 636 W HCPCS: Performed by: NURSE PRACTITIONER

## 2022-05-19 PROCEDURE — 84100 ASSAY OF PHOSPHORUS: CPT | Performed by: NURSE PRACTITIONER

## 2022-05-19 PROCEDURE — 97161 PT EVAL LOW COMPLEX 20 MIN: CPT

## 2022-05-19 PROCEDURE — 94799 UNLISTED PULMONARY SVC/PX: CPT

## 2022-05-19 PROCEDURE — 92610 EVALUATE SWALLOWING FUNCTION: CPT

## 2022-05-19 PROCEDURE — 94761 N-INVAS EAR/PLS OXIMETRY MLT: CPT

## 2022-05-19 PROCEDURE — 83735 ASSAY OF MAGNESIUM: CPT | Performed by: NURSE PRACTITIONER

## 2022-05-19 PROCEDURE — 25000242 PHARM REV CODE 250 ALT 637 W/ HCPCS: Performed by: NURSE PRACTITIONER

## 2022-05-19 PROCEDURE — 36415 COLL VENOUS BLD VENIPUNCTURE: CPT | Performed by: NURSE PRACTITIONER

## 2022-05-19 PROCEDURE — 25000003 PHARM REV CODE 250: Performed by: EMERGENCY MEDICINE

## 2022-05-19 PROCEDURE — G0378 HOSPITAL OBSERVATION PER HR: HCPCS

## 2022-05-19 RX ORDER — NICARDIPINE HYDROCHLORIDE 0.2 MG/ML
0-15 INJECTION INTRAVENOUS CONTINUOUS
Status: DISCONTINUED | OUTPATIENT
Start: 2022-05-19 | End: 2022-05-20 | Stop reason: HOSPADM

## 2022-05-19 RX ADMIN — TIOTROPIUM BROMIDE INHALATION SPRAY 2 PUFF: 1.56 SPRAY, METERED RESPIRATORY (INHALATION) at 10:05

## 2022-05-19 RX ADMIN — NICARDIPINE HYDROCHLORIDE 7.5 MG/HR: 0.2 INJECTION INTRAVENOUS at 04:05

## 2022-05-19 RX ADMIN — INSULIN ASPART 3 UNITS: 100 INJECTION, SOLUTION INTRAVENOUS; SUBCUTANEOUS at 12:05

## 2022-05-19 RX ADMIN — ASPIRIN 81 MG: 81 TABLET, COATED ORAL at 09:05

## 2022-05-19 RX ADMIN — ENOXAPARIN SODIUM 40 MG: 40 INJECTION SUBCUTANEOUS at 04:05

## 2022-05-19 RX ADMIN — ATORVASTATIN CALCIUM 40 MG: 40 TABLET, FILM COATED ORAL at 09:05

## 2022-05-19 RX ADMIN — METOPROLOL SUCCINATE 100 MG: 50 TABLET, FILM COATED, EXTENDED RELEASE ORAL at 09:05

## 2022-05-19 RX ADMIN — INSULIN DETEMIR 40 UNITS: 100 INJECTION, SOLUTION SUBCUTANEOUS at 09:05

## 2022-05-19 RX ADMIN — POLYETHYLENE GLYCOL 3350 17 G: 17 POWDER, FOR SOLUTION ORAL at 09:05

## 2022-05-19 NOTE — H&P
[facility] - Emergency [facility] [place]  [facility] Medicine  History & Physical  Face to face encounter: 5/18/2022    Patient Name: Steff Johnson  MRN: [id number]  Patient Class: OP- Observation  Admission Date: 5/18/2022  Attending Physician: Juan Alberto Iqbal MD  Primary Care Provider: Cristina Ren MD         Patient information was obtained from patient, past medical records and ER records.     Subjective:     Principal Problem:Acute right-sided weakness    Chief Complaint:   Chief Complaint   Patient presents with    Blurred Vision     Patient reports blurry vision in R eye and R sided weakness since yesterday around 0900         HPI: 62-year-old, nonsmoker,  female with history of diabetes mellitus (hemoglobin A1c 12.7 on 01/06/2022), hypertension, stroke in 2019, COPD, arthritis  Presented to the emergency department with right-sided weakness and blurry vision  Reports woke up at 8:00 a.m. yesterday morning with right-sided weakness and blurry vision in the right eye  Denies fever, chills, shortness of breath, chest pain, abdominal pain, nausea, vomiting, diarrhea, urinary symptoms  Reports taking her medication as prescribed. States that her catapress patch fell off a few days ago.    Noted to be hypertensive with systolic blood pressure 162-245. Work up in the ED was unremarkable.  CTA head was negative. CTA head and neck showed no clinically significant stenosis, large vessel occlusion or aneurysm.  EKG showed sinus rhythm with nonspecific T-wave abnormality.        Past Medical History:   Diagnosis Date    Arthritis     Complete tear of left rotator cuff 11/09/2017    COPD (chronic obstructive pulmonary disease)     COVID-19 infection 07/02/2021    Diabetes mellitus     H/o Cerebrovascular accident (CVA) of left pontine structure 12/12/2019    Hypertension     Personal history of colonic polyps     Stroke     Wears glasses        Past Surgical History:   Procedure Laterality  Date    COLONOSCOPY N/A 4/11/2017    Procedure: COLONOSCOPY;  Surgeon: Jared Antonio MD;  Location: [facility];  Service: Endoscopy;  Laterality: N/A;    HYSTERECTOMY      OOPHORECTOMY      SHOULDER SURGERY      R       Review of patient's allergies indicates:  No Known Allergies    No current facility-administered medications on file prior to encounter.     Current Outpatient Medications on File Prior to Encounter   Medication Sig    aspirin 81 MG Chew Take 1 tablet (81 mg total) by mouth once daily.    atorvastatin (LIPITOR) 40 MG tablet Take 1 tablet (40 mg total) by mouth once daily.    blood glucose control, low Soln Use as directed (Patient taking differently: 1 each by Misc.(Non-Drug; Combo Route) route. Use as directed)    blood sugar diagnostic (TRUE METRIX GLUCOSE TEST STRIP) Strp Inject 100 strips into the skin 2 (two) times a day. (Patient taking differently: Inject 1 strip into the skin 2 (two) times a day.)    chlorthalidone (HYGROTEN) 50 MG Tab Take 1 tablet (50 mg total) by mouth once daily.    cloNIDine 0.1 mg/24 hr td ptwk (CATAPRES) 0.1 mg/24 hr Place 1 patch onto the skin every 7 days.    ergocalciferol (ERGOCALCIFEROL) 50,000 unit Cap Take 50,000 Units by mouth every 7 days.    hydrALAZINE (APRESOLINE) 100 MG tablet Take 1 tablet (100 mg total) by mouth 3 (three) times daily.    insulin aspart U-100 (NOVOLOG FLEXPEN U-100 INSULIN) 100 unit/mL (3 mL) InPn pen Inject 12 Units into the skin 3 (three) times daily before meals. 12 units before meals + correction scale. Max daily dose of 60 units.    insulin degludec (TRESIBA FLEXTOUCH U-100) 100 unit/mL (3 mL) insulin pen Inject 40 Units into the skin once daily.    lancets (TRUEPLUS LANCETS) 33 gauge Misc Inject 100 lancets into the skin 2 (two) times daily. (Patient taking differently: Inject 1 lancet into the skin 2 (two) times daily.)    lancets Misc To check BG 2 times daily, to use with insurance preferred meter (Patient  "taking differently: 1 lancet by Misc.(Non-Drug; Combo Route) route 2 (two) times a day. To check BG 2 times daily, to use with insurance preferred meter)    metoprolol succinate (TOPROL-XL) 100 MG 24 hr tablet Take 1 tablet (100 mg total) by mouth once daily.    pen needle, diabetic (BD MI 2ND GEN PEN NEEDLE) 32 gauge x 5/32" Ndle Uses 4 daily (Patient taking differently: 1 pen by Misc.(Non-Drug; Combo Route) route 4 (four) times daily. Uses 4 daily)    umeclidinium (INCRUSE ELLIPTA) 62.5 mcg/actuation inhalation capsule Inhale 62.5 mcg into the lungs once daily. Controller    [DISCONTINUED] alcohol swabs (BD ALCOHOL SWABS) PadM Apply 1 each topically 2 (two) times a day.    [DISCONTINUED] blood-glucose meter (TRUE METRIX GLUCOSE METER) kit Use as instructed    [DISCONTINUED] fluticasone-umeclidin-vilanter (TRELEGY ELLIPTA) 100-62.5-25 mcg DsDv Inhale 1 puff into the lungs once daily.    [DISCONTINUED] lancing device (TRUEDRAW LANCING DEVICE) Misc Inject 1 Device into the skin 2 (two) times daily.    [DISCONTINUED] pen needle, diabetic (BD MI 2ND GEN PEN NEEDLE) 32 gauge x 5/32" Ndle Inject daily     Family History       Problem Relation (Age of Onset)    Anemia Daughter    Asthma Mother    Diabetes Mother, Father, Brother    Hypertension Mother, Brother          Tobacco Use    Smoking status: Never Smoker    Smokeless tobacco: Never Used   Substance and Sexual Activity    Alcohol use: No    Drug use: No    Sexual activity: Not Currently     Review of Systems   All other systems reviewed and are negative.  Objective:     Vital Signs (Most Recent):  Temp: 97.9 °F (36.6 °C) (05/18/22 1703)  Pulse: 82 (05/18/22 2030)  Resp: (!) 22 (05/18/22 2031)  BP: (!) 177/73 (05/18/22 2030)  SpO2: 97 % (05/18/22 2030)   Vital Signs (24h Range):  Temp:  [97.9 °F (36.6 °C)] 97.9 °F (36.6 °C)  Pulse:  [76-91] 82  Resp:  [14-29] 22  SpO2:  [97 %-100 %] 97 %  BP: (158-245)/() 177/73     Weight: 51.7 kg (114 " lb)  Body mass index is 23.03 kg/m².    Physical Exam  Constitutional:       Appearance: She is not ill-appearing or diaphoretic.   HENT:      Head: Normocephalic and atraumatic.      Right Ear: External ear normal.      Left Ear: External ear normal.      Nose: Nose normal.      Mouth/Throat:      Mouth: Mucous membranes are moist.   Eyes:      Conjunctiva/sclera: Conjunctivae normal.   Cardiovascular:      Rate and Rhythm: Normal rate and regular rhythm.      Pulses: Normal pulses.      Heart sounds: Normal heart sounds.   Pulmonary:      Effort: Pulmonary effort is normal.      Breath sounds: Normal breath sounds.   Abdominal:      General: Bowel sounds are normal.      Palpations: Abdomen is soft.   Genitourinary:     Comments: No del rosario  Musculoskeletal:      Cervical back: Normal range of motion and neck supple.      Right lower leg: No edema.      Left lower leg: No edema.   Skin:     General: Skin is warm and dry.   Neurological:      General: No focal deficit present.      Mental Status: She is alert and oriented to person, place, and time.   Psychiatric:         Mood and Affect: Mood normal.         Behavior: Behavior normal.           Significant Labs: All pertinent labs within the past 24 hours have been reviewed.  CBC:   Recent Labs   Lab 05/18/22 2036   WBC 11.01   HGB 13.2   HCT 38.2        CMP:   Recent Labs   Lab 05/18/22  1716      K 4.2      CO2 22*   *   BUN 23   CREATININE 0.9   CALCIUM 8.9   PROT 7.6   ALBUMIN 3.8   BILITOT 0.6   ALKPHOS 69   AST 24   ALT 30   ANIONGAP 10   EGFRNONAA >60.0   Coagulation:   Recent Labs   Lab 05/18/22  1716   INR 1.1   Lipid Panel:   Recent Labs   Lab 05/18/22  1721   CHOL 192   HDL 95*   LDLCALC 87.6   TRIG 47   CHOLHDL 49.5       Troponin:   Recent Labs   Lab 05/18/22  1716   TROPONINI <0.030     TSH:   Recent Labs   Lab 05/18/22 1716   TSH 1.390       Significant Imaging: I have reviewed all pertinent imaging results/findings within  the past 24 hours.  CT Head Without Contrast    Result Date: 5/18/2022  CMS MANDATED QUALITY DATA - CT RADIATION 436 All CT scans at this facility utilize dose modulation, iterative reconstruction, and/or weight based dosing when appropriate to reduce radiation dose to as low as reasonably achievable. CLINICAL HISTORY: 62 years (1960) Female Neuro deficit, acute, stroke suspected TECHNIQUE: CT HEAD WITHOUT IV CONTRAST. 106 images obtained. Axial CT of the brain without contrast using soft tissue and bone algorithm. Please note in the acute setting if there is a clinical concern for an acute stroke MRI would be more sensitive/specific for evaluation of ischemia. COMPARISON: Most recent CT from January 6, 2022. FINDINGS: No acute intracranial hemorrhage, hydrocephalus, herniation or midline shift and the basal and suprasellar cisterns are patent. No acute skull fracture is identified. Mild diffuse cerebral atrophy for age with periventricular deep cerebral white matter low attenuation, a nonspecific finding in this age group which can be seen in any diffuse white matter process but which is most commonly associated with chronic microvascular ischemic disease. There are tiny rounded defects in the basal ganglia compatible with prior lacunar infarcts. There are scattered atheromatous calcifications in the intracranial internal carotid arteries. Orbital contents appear within normal limits. External auditory canals are unremarkable. The visualized paranasal sinuses and mastoid air cells are essentially clear. IMPRESSION: 1. No acute intracranial process. 2. Chronic/involutional findings as noted above. . Electronically signed by:  Serg Brambila MD  5/18/2022 5:57 PM CDT Workstation: [id number]    X-Ray Chest AP Portable    Result Date: 5/18/2022  CLINICAL HISTORY: 62 years (1960) Female CHF chf TECHNIQUE: Portable AP radiograph the chest. COMPARISON: Radiograph from July 2, 2021 FINDINGS: The lungs are clear.  Costophrenic angles are seen without effusion. No pneumothorax is identified. The heart is normal in size. The mediastinum is within normal limits. Osseous structures show degenerative disc disease and degenerative changes in the shoulders. The visualized upper abdomen is unremarkable. IMPRESSION: No acute cardiac or pulmonary process. . Electronically signed by:  Serg Brambila MD  5/18/2022 5:36 PM CDT Workstation: [id number]    CTA Head and Neck (xpd)    Result Date: 5/18/2022  CMS MANDATED QUALITY DATA - CT RADIATION 436 All CT scans at this facility utilize dose modulation, iterative reconstruction, and/or weight based dosing when appropriate to reduce radiation dose to as low as reasonably achievable. CMS MANDATED QUALITY DATA - CAROTID - 195 All measurements and percent stenosis described below were determined using NASCET criteria or criteria similar to NASCET, as defined by the Society of Radiologists in Ultrasound Consensus Conference, Radiology, 2003 CLINICAL HISTORY: 62 years (1960) Female Stroke, hemorrhagic TECHNIQUE: CT HEAD NECK ANGIOGRAPHY WITH IV CONTRAST. 428 images obtained. CT angiography of the head and neck was performed using thin axial slices respectively and reviewed in multiple planes. Two and three dimensional multiplanar reformatted images were generated and submitted to PACS. POST PROCESSING: (Vitrea) 3D advanced post-processing was performed by the interpreting physician using an independent workstation utilizing a combination of MIP and MPR techniques to better evaluate anatomy and disease process.  3D rendering was performed with physician participation and supervision. CONTRAST: 100  mL Omni 350 was injected intravenously. COMPARISON: None available. FINDINGS: CTA of the [place]: There is patency of the intracranial circulation including the bilateral internal carotid arteries, the carotid terminus, the A1 and M1 segments, the bilateral intracranial vertebral  arteries, the basilar artery and its distal branches. The posterior communicating arteries are patent. No aneurysm is identified within the limits of the technique. Conventional angiography is more sensitive for the detection of small aneurysms. CTA of the Neck: There is a three-vessel aortic arch. CTA of the neck demonstrates that the origins of the great vessels are widely patent. No aneurysm is seen. The carotid arteries are tortuous and the distal common carotid, carotid bulb and proximal internal carotid arteries course behind the oropharynx. RIGHT: Common carotid artery origin: Patent. Cervical segment: Patent. External carotid origin characteristics: Patent. Carotid bifurcation: Patent. Internal carotid origin characteristics: No focal stenosis. Vertebral artery: within normal limits. LEFT Common carotid artery origin: Patent. Cervical segment: Patent. External carotid origin characteristics: Patent. Carotid bifurcation: Patent. Internal carotid origin characteristics: No focal stenosis. Vertebral artery: within normal limits. IMPRESSION: No clinically significant stenosis, large vessel occlusion or aneurysm is identified as outlined above. . Electronically signed by:  Serg Brambila MD  5/18/2022 6:00 PM CDT Workstation: [id number]       Assessment/Plan:     * Acute right-sided weakness  With blurred vision right eye since waken up yesterday morning, improved/resolved  Concern for stroke  History of stroke in 2019  Consult neurology  Stroke protocol  Neuro checks q 4 hours  Pulse oximetry q.4 hours  Cardiac monitor for arrhythmia  Aspiration precautions  Fall precautions  NPO til passing nursing swallow assessment  Permissive hypertension, SBP goal 180  Continue aspirin and atorvastatin  Labs:  CMP, magnesium, CBC daily  Consult and treat:  PT, OT, ST, dietitian, case management  Imaging: MRI brain, echocardiogram without bubble study (no PFO per echo in 2019)            Hypertensive emergency  Reports  taking medication as prescribed but catapress patch fell off a few days ago  Nicardipine infusion in progress, continue, SBP goal 180 per Neurology recommendation  Hold BP medication for now        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  Stable  Monitor      Hyperglycemia due to type 2 diabetes mellitus  Hemoglobin A1c 12.7 on 01/06/2022  Hemoglobin A1c requested  Continue basal insulin  Monitor blood glucose  Low-dose sliding scale insulin        VTE Risk Mitigation (From admission, onward)    None             SANTA Pñea  Department of Hospital Medicine   [facility] - Emergency Dept

## 2022-05-19 NOTE — PT/OT/SLP EVAL
Occupational Therapy   Evaluation    Name: Steff Johnson  MRN: [id number]  Admitting Diagnosis:  Acute right-sided weakness  Recent Surgery: * No surgery found *      Recommendations:     Discharge Recommendations: outpatient OT  Discharge Equipment Recommendations:  none  Barriers to discharge:  None    Assessment:     Steff Johnson is a 62 y.o. female with a medical diagnosis of Acute right-sided weakness.   Performance deficits affecting function: impaired self care skills, impaired functional mobilty, gait instability, impaired balance, weakness, impaired endurance, impaired coordination, impaired fine motor, impaired cardiopulmonary response to activity.      Pt presents with baseline RUE weakness/coordination deficits from prior CVA; pt came to ED with increased R side weakness and blurry vision in the right eye; she reports the R side deficits have improved since admit but that she is not at baseline; she reports the blurry vision in her right eye is new since this admit and has not improved.      Rehab Prognosis: Good; patient would benefit from acute skilled OT services to address these deficits and reach maximum level of function.       Plan:     Patient to be seen 3 x/week to address the above listed problems via self-care/home management, therapeutic exercises, therapeutic activities, neuromuscular re-education, wheelchair management/training  · Plan of Care Expires: 06/18/22  · Plan of Care Reviewed with: patient    Subjective     Chief Complaint: blurry vision in right eye; weakness/coordination deficits RUE  Patient/Family Comments/goals: to get back to her baseline level of function     Occupational Profile:  Living Environment: Lives with nirali in a mobile home with ramp entry; walk-in shower  Previous level of function: Mod (I) with rollator or cane; she sometimes furniture surfs in the home; nirali provides supervision and assist with IADLs; owns but does not use shower chair (pt reports  standing to shower at baseline).  Roles and Routines: limited homemaker  Equipment Used at Home:  rollator, cane (pt states she owns but does not use w/c, RW, BSC, and shower chair)  Assistance upon Discharge: sister campoverde    Pain/Comfort:  · Pain Rating 1: 0/10  · Pain Rating Post-Intervention 1: 0/10    Objective:     Communicated with: nurse prior to session.  Patient found supine with telemetry, pulse ox (continuous), oxygen, peripheral IV, blood pressure cuff upon OT entry to room.    General Precautions: Standard, fall, aspiration   Orthopedic Precautions:N/A   Braces: N/A  Respiratory Status: Room air    Occupational Performance:    Bed Mobility:    · Patient completed Supine to Sit with minimum assistance  · Patient completed Sit to Supine with stand by assistance   · Side scoots to HOB SBA    Functional Mobility/Transfers:  · NT; BP elevated    Activities of Daily Living:  · Pt declined bed level ADLs; OOB activity limited 2/2 elevated BP    Cognitive/Visual Perceptual:  Cognitive/Psychosocial Skills:     -       Follows Commands/attention:Follows multistep  commands  -       Communication: dysarthria  -       Safety awareness/insight to disability: TBA  -       Mood/Affect/Coping skills/emotional control: Appropriate to situation  Visual/Perceptual:      -impaired acuity/ pt reports blurry vision in right eye    Clock Draw Assessment: Pt issued sheet of paper and asked to draw clock with all # located appropriately. Pt asked to model clock to report time as 10 past 11. Pt demo impaired executive functioning and impaired visual spatial skills for # organization on right side of clock.  Pt able to correct mistakes with verbal cues.      Physical Exam:  Sensation:    -       Impaired  light/touch RUE  Upper Extremity Range of Motion:     -       Right Upper Extremity: WFL although deficits at right shoulder with AROM against gravity  -       Left Upper Extremity: WFL  Upper Extremity Strength:    -        Right Upper Extremity: 3+/5  -       Left Upper Extremity: 5/5   Strength:    -       Right Upper Extremity: Fair  -       Left Upper Extremity: Good  Fine Motor Coordination:    -       Impaired  Right hand thumb/finger opposition skills  , Right hand, manipulation of objects   and Right hand, graphomotor skills    Gross motor coordination:   WFL    AMPAC 6 Click ADL:  AMPAC Total Score: 19    Treatment & Education:  Pt educated on OT d/c recommendation and acute POC  Education:    Patient left HOB elevated with all lines intact, call button in reach and bed alarm on; nurse present and aware of BP being elevated    GOALS:   Multidisciplinary Problems     Occupational Therapy Goals        Problem: Occupational Therapy    Goal Priority Disciplines Outcome Interventions   Occupational Therapy Goal     OT, PT/OT     Description: Goals to be met by: d/c     Patient will increase functional independence with ADLs by performing:    UE Dressing with Stand-by Assistance.  LE Dressing with Stand-by Assistance.  Grooming while standing at sink with Stand-by Assistance.  Toileting from toilet with Stand-by Assistance for hygiene and clothing management.   Toilet transfer to toilet with Stand-by Assistance.                     History:     Past Medical History:   Diagnosis Date    Arthritis     Complete tear of left rotator cuff 11/09/2017    COPD (chronic obstructive pulmonary disease)     COVID-19 infection 07/02/2021    Diabetes mellitus     H/o Cerebrovascular accident (CVA) of left pontine structure 12/12/2019    Hypertension     Personal history of colonic polyps     Stroke     Wears glasses        Past Surgical History:   Procedure Laterality Date    COLONOSCOPY N/A 4/11/2017    Procedure: COLONOSCOPY;  Surgeon: Jared Antonio MD;  Location: [facility];  Service: Endoscopy;  Laterality: N/A;    HYSTERECTOMY      OOPHORECTOMY      SHOULDER SURGERY      R       Time Tracking:     OT Date of Treatment:  05/19/22  OT Start Time: 1021  OT Stop Time: 1058  OT Total Time (min): 37 min    Billable Minutes:Evaluation 12  Therapeutic Activity 25    5/19/2022

## 2022-05-19 NOTE — ASSESSMENT & PLAN NOTE
Reports taking medication as prescribed but catapress patch fell off a few days ago  Nicardipine infusion in progress, continue, SBP goal 180 per Neurology recommendation  Hold BP medication for now

## 2022-05-19 NOTE — PROGRESS NOTES
[facility] Medicine  Progress Note    Patient Name: Steff Johnson  MRN: [id number]  Patient Class: OP- Observation   Admission Date: 5/18/2022  Length of Stay: 0 days  Attending Physician: Alf Bourne MD  Primary Care Provider: Cristina Ren MD        Subjective:     Principal Problem:Acute right-sided weakness        HPI:  62-year-old, nonsmoker,  female with history of diabetes mellitus (hemoglobin A1c 12.7 on 01/06/2022), hypertension, stroke in 2019, COPD, arthritis  Presented to the emergency department with right-sided weakness and blurry vision  Reports woke up at 8:00 a.m. yesterday morning with right-sided weakness and blurry vision in the right eye  Denies fever, chills, shortness of breath, chest pain, abdominal pain, nausea, vomiting, diarrhea, urinary symptoms  Reports taking her medication as prescribed. States that her catapress patch fell off a few days ago.    Noted to be hypertensive with systolic blood pressure 162-245. Work up in the ED was unremarkable.  CTA head was negative. CTA head and neck showed no clinically significant stenosis, large vessel occlusion or aneurysm.  EKG showed sinus rhythm with nonspecific T-wave abnormality.        Overview/Hospital Course:  Seen and examined   Doing echo  No facial asymmetry  Still c/o mild weakness at right         Interval History:     Review of Systems  As per subjective  Objective:     Vital Signs (Most Recent):  Temp: 97.6 °F (36.4 °C) (05/19/22 1059)  Pulse: 71 (05/19/22 1200)  Resp: 16 (05/19/22 1200)  BP: (!) 172/75 (05/19/22 1200)  SpO2: 98 % (05/19/22 1200)   Vital Signs (24h Range):  Temp:  [97.6 °F (36.4 °C)-98.2 °F (36.8 °C)] 97.6 °F (36.4 °C)  Pulse:  [] 71  Resp:  [9-29] 16  SpO2:  [94 %-100 %] 98 %  BP: (140-245)/() 172/75     Weight: 60 kg (132 lb 4.4 oz)  Body mass index is 26.72 kg/m².    Intake/Output Summary (Last 24 hours) at 5/19/2022 1547  Last data filed at 5/19/2022  0900  Gross per 24 hour   Intake 240 ml   Output 200 ml   Net 40 ml      Physical Exam  Vitals and nursing note reviewed.   HENT:      Head: Normocephalic and atraumatic.   Eyes:      Conjunctiva/sclera: Conjunctivae normal.   Neck:      Vascular: No JVD.   Cardiovascular:      Heart sounds: Normal heart sounds.   Pulmonary:      Effort: Pulmonary effort is normal.   Abdominal:      Palpations: Abdomen is soft.   Skin:     General: Skin is warm.   Neurological:      Mental Status: She is alert and oriented to person, place, and time.   Psychiatric:         Mood and Affect: Mood normal.       Significant Labs: All pertinent labs within the past 24 hours have been reviewed.  Cardiac Markers:   Recent Labs   Lab 05/18/22 2036   BNP 32     Coagulation:   Recent Labs   Lab 05/18/22  1716   INR 1.1     Lactic Acid: No results for input(s): LACTATE in the last 48 hours.    Significant Imaging: I have reviewed all pertinent imaging results/findings within the past 24 hours.      Assessment/Plan:      * Acute right-sided weakness  Neuro signs and symptoms resolved   CTA head neg  Follow up MRI brain  Given History of stroke in 2019  Follow  neurology  Stroke protocol  Neuro checks q 4 hours  Cardiac monitor for arrhythmia  Aspiration precautions  Fall precautions  Follow ECHO   Permissive hypertension, SBP goal 180  Continue aspirin and atorvastatin  PT recommend outpatient PT         Hypertensive emergency  Reports taking medication as prescribed but catapress patch fell off a few days ago  Off Nicardipine infusion  And BP better   Permissive HTN until after MRI brain resulted       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  Stable  Monitor      Hyperglycemia due to type 2 diabetes mellitus  Hemoglobin A1c 12.7 on 01/06/2022  Hemoglobin A1c requested  Continue basal insulin  Monitor blood glucose  Low-dose sliding scale insulin          VTE Risk Mitigation (From admission, onward)         Ordered     enoxaparin injection 40  mg  Daily         05/18/22 2202     IP VTE HIGH RISK PATIENT  Once         05/18/22 2202                Discharge Planning   ALEX:      Code Status: Full Code   Is the patient medically ready for discharge?:     Reason for patient still in hospital (select all that apply): Treatment                     Alf Bourne MD  Department of Hospital Medicine   [facility]

## 2022-05-19 NOTE — NURSING
Cardene gtt stopped per neuro recs for permissive HTN up to .  MRI notified of pt off gtt & could go when they were ready for her.

## 2022-05-19 NOTE — PLAN OF CARE
Problem: Adult Inpatient Plan of Care  Goal: Plan of Care Review  Outcome: Ongoing, Progressing  Goal: Patient-Specific Goal (Individualized)  Outcome: Ongoing, Progressing  Goal: Absence of Hospital-Acquired Illness or Injury  Outcome: Ongoing, Progressing  Goal: Optimal Comfort and Wellbeing  Outcome: Ongoing, Progressing  Goal: Readiness for Transition of Care  Outcome: Ongoing, Progressing     Problem: Adjustment to Illness (Stroke, Ischemic/Transient Ischemic Attack)  Goal: Optimal Coping  Outcome: Ongoing, Progressing     Problem: Bowel Elimination Impaired (Stroke, Ischemic/Transient Ischemic Attack)  Goal: Effective Bowel Elimination  Outcome: Ongoing, Progressing     Problem: Cerebral Tissue Perfusion (Stroke, Ischemic/Transient Ischemic Attack)  Goal: Optimal Cerebral Tissue Perfusion  Outcome: Ongoing, Progressing     Problem: Cognitive Impairment (Stroke, Ischemic/Transient Ischemic Attack)  Goal: Optimal Cognitive Function  Outcome: Ongoing, Progressing     Problem: Communication Impairment (Stroke, Ischemic/Transient Ischemic Attack)  Goal: Improved Communication Skills  Outcome: Ongoing, Progressing     Problem: Functional Ability Impaired (Stroke, Ischemic/Transient Ischemic Attack)  Goal: Optimal Functional Ability  Outcome: Ongoing, Progressing     Problem: Respiratory Compromise (Stroke, Ischemic/Transient Ischemic Attack)  Goal: Effective Oxygenation and Ventilation  Outcome: Ongoing, Progressing     Problem: Sensorimotor Impairment (Stroke, Ischemic/Transient Ischemic Attack)  Goal: Improved Sensorimotor Function  Outcome: Ongoing, Progressing     Problem: Swallowing Impairment (Stroke, Ischemic/Transient Ischemic Attack)  Goal: Optimal Eating and Swallowing without Aspiration  Outcome: Ongoing, Progressing     Problem: Urinary Elimination Impaired (Stroke, Ischemic/Transient Ischemic Attack)  Goal: Effective Urinary Elimination  Outcome: Ongoing, Progressing     Problem: Diabetes  Comorbidity  Goal: Blood Glucose Level Within Targeted Range  Outcome: Ongoing, Progressing     Problem: Infection  Goal: Absence of Infection Signs and Symptoms  Outcome: Ongoing, Progressing     Problem: Impaired Wound Healing  Goal: Optimal Wound Healing  Outcome: Ongoing, Progressing     Problem: Skin Injury Risk Increased  Goal: Skin Health and Integrity  Outcome: Ongoing, Progressing

## 2022-05-19 NOTE — ASSESSMENT & PLAN NOTE
Reports taking medication as prescribed but catapress patch fell off a few days ago  Off Nicardipine infusion  And BP better   Permissive HTN until after MRI brain resulted

## 2022-05-19 NOTE — PT/OT/SLP EVAL
Physical Therapy Evaluation    Patient Name:  Steff Johnson   MRN:  [id number]    Recommendations:     Discharge Recommendations:  outpatient PT   Discharge Equipment Recommendations: none   Barriers to discharge: None    Assessment:     Steff Johnson is a 62 y.o. female admitted with a medical diagnosis of Acute right-sided weakness.  She presents with the following impairments/functional limitations:  weakness, impaired endurance, impaired self care skills, impaired functional mobilty, gait instability, impaired balance, visual deficits, decreased coordination, decreased lower extremity function, decreased upper extremity function, decreased safety awareness, pain, impaired coordination, impaired cardiopulmonary response to activity, impaired fine motor.    Pt found HOB elevated and agreeable to working with PT. Pt A & O x  3 and has the following co-morbidities: DM, COPD, CKD, CVA, CN III Palsy.  Pt tolerated session fairly well and required minimal A for safe mobilization during session today. Pt would benefit from acute PT during hospitalization to increase strength, endurance and safety with mobility and would benefit from OP PT upon discharge home.      Rehab Prognosis: Fair; patient would benefit from acute skilled PT services to address these deficits and reach maximum level of function.    Recent Surgery: * No surgery found *      Plan:     During this hospitalization, patient to be seen 5 x/week to address the identified rehab impairments via gait training, therapeutic activities, therapeutic exercises, neuromuscular re-education and progress toward the following goals:    · Plan of Care Expires:  06/23/22    Subjective     Chief Complaint: double vision and increased R sided weakness (on top of residual R weakness from previous stroke).  Patient/Family Comments/goals: OP PT  Pain/Comfort:  · Pain Rating 1: 8/10  · Location - Side 1: Right  · Location - Orientation 1: upper  · Location 1: thigh  · Pain  Addressed 1: Reposition, Distraction  · Pain Rating Post-Intervention 1: 8/10    Patients cultural, spiritual, Rastafari conflicts given the current situation:      Living Environment:  Pt lives with her ernie in a mobile home with 4 steps/ramp to enter.  Prior to admission, patients level of function was CGA > SBA with cane, per pt & ernie.  Equipment used at home: rollator, shower chair, bedside commode, walker, rolling, wheelchair, cane, straight.  DME owned (not currently used): none.  Upon discharge, patient will have assistance from her ernie and her brother.    Objective:     Communicated with RADHA Lopez prior to session.  Patient found HOB elevated with bed alarm, blood pressure cuff, peripheral IV, pulse ox (continuous), telemetry, PureWick  upon PT entry to room.    General Precautions: Standard, diabetic, fall   Orthopedic Precautions:N/A   Braces: N/A  Respiratory Status: Room air    Exams:  · Cognitive Exam:  Patient is oriented to Person, Place and Situation  · RLE ROM: WFL except ankle to neutral only  · RLE Strength: grossly 4/5 due to increased tonal influence  · LLE ROM: WFL  · LLE Strength: grossly 4+/5    Functional Mobility:  · Bed Mobility:     · Scooting: minimum assistance and vc in supine  · Supine to Sit: minimum assistance  · Sit to Supine: minimum assistance  · Transfers:     · Sit to Stand:  minimum assistance and vc with rolling walker  · Gait: x 35 feet with rolling walker and minimal A for balance due to pt leaning toward her R side at times.    Therapeutic Activities and Exercises:   Pt was educated on the following: call light use, importance of OOB activity and functional mobility to negate the negative effects of prolonged bed rest during this hospitalization, safe transfers/ambulation and discharge planning recommendations/options.      AM-PAC 6 CLICK MOBILITY  Total Score:19     Patient left HOB elevated with all lines intact, call button in reach, bed alarm on, RN  notified and pt's fiance' present.    GOALS:   Multidisciplinary Problems     Physical Therapy Goals        Problem: Physical Therapy    Goal Priority Disciplines Outcome Goal Variances Interventions   Physical Therapy Goal     PT, PT/OT      Description: Goals to be met by: 22     Patient will increase functional independence with mobility by performin. Supine to sit with Supervision  2. Sit to stand transfer with Supervision  3. Bed to chair transfer with Supervision using Rolling Walker  4. Gait  x 150 feet with Supervision using Rolling Walker.                        History:     Past Medical History:   Diagnosis Date    Arthritis     Complete tear of left rotator cuff 2017    COPD (chronic obstructive pulmonary disease)     COVID-19 infection 2021    Diabetes mellitus     H/o Cerebrovascular accident (CVA) of left pontine structure 2019    Hypertension     Personal history of colonic polyps     Stroke     Wears glasses        Past Surgical History:   Procedure Laterality Date    COLONOSCOPY N/A 2017    Procedure: COLONOSCOPY;  Surgeon: Jared Antonio MD;  Location: [facility];  Service: Endoscopy;  Laterality: N/A;    HYSTERECTOMY      OOPHORECTOMY      SHOULDER SURGERY      R       Time Tracking:     PT Received On: 22  PT Start Time: 912     PT Stop Time: 934  PT Total Time (min): 22 min     Billable Minutes: Evaluation 10 and Gait Training 12      2022

## 2022-05-19 NOTE — ASSESSMENT & PLAN NOTE
Neuro signs and symptoms resolved   CTA head neg  Follow up MRI brain  Given History of stroke in 2019  Follow  neurology  Stroke protocol  Neuro checks q 4 hours  Cardiac monitor for arrhythmia  Aspiration precautions  Fall precautions  Follow ECHO   Permissive hypertension, SBP goal 180  Continue aspirin and atorvastatin

## 2022-05-19 NOTE — ASSESSMENT & PLAN NOTE
With blurred vision right eye since waken up yesterday morning, improved/resolved  Concern for stroke  History of stroke in 2019  Consult neurology  Stroke protocol  Neuro checks q 4 hours  Pulse oximetry q.4 hours  Cardiac monitor for arrhythmia  Aspiration precautions  Fall precautions  NPO til passing nursing swallow assessment  Permissive hypertension, SBP goal 180  Continue aspirin and atorvastatin  Labs:  CMP, magnesium, CBC daily  Consult and treat:  PT, OT, ST, dietitian, case management  Imaging: MRI brain, echocardiogram without bubble study (no PFO per echo in 2019)

## 2022-05-19 NOTE — HPI
62-year-old, nonsmoker,  female with history of diabetes mellitus (hemoglobin A1c 12.7 on 01/06/2022), hypertension, stroke in 2019, COPD, arthritis  Presented to the emergency department with right-sided weakness and blurry vision  Reports woke up at 8:00 a.m. yesterday morning with right-sided weakness and blurry vision in the right eye  Denies fever, chills, shortness of breath, chest pain, abdominal pain, nausea, vomiting, diarrhea, urinary symptoms  Reports taking her medication as prescribed. States that her catapress patch fell off a few days ago.    Noted to be hypertensive with systolic blood pressure 162-245. Work up in the ED was unremarkable.  CTA head was negative. CTA head and neck showed no clinically significant stenosis, large vessel occlusion or aneurysm.  EKG showed sinus rhythm with nonspecific T-wave abnormality.

## 2022-05-19 NOTE — CONSULTS
[facility]  Neurology Consult    Patient Name: Steff Johnson   MRN: [id number]  : 1960  TODAY'S DATE: 2022  ADMIT DATE: 2022  4:57 PM                                          CONSULTED PROVIDER: Jennifer Tony MD, Neurologist. Cellphone: [phone]  CONSULT REQUESTED BY: Alf Bourne MD     Chief Complaint   Patient presents with    Blurred Vision     Patient reports blurry vision in R eye and R sided weakness since yesterday around 0900         HPI per EMR:  62-year-old, nonsmoker,  female with history of diabetes mellitus (hemoglobin A1c 12.7 on 2022), hypertension, stroke in 2019, COPD, arthritis  Presented to the emergency department with right-sided weakness and blurry vision  Reports woke up at 8:00 a.m. yesterday morning with right-sided weakness and blurry vision in the right eye  Denies fever, chills, shortness of breath, chest pain, abdominal pain, nausea, vomiting, diarrhea, urinary symptoms  Reports taking her medication as prescribed. States that her catapress patch fell off a few days ago.     Noted to be hypertensive with systolic blood pressure 162-245. Work up in the ED was unremarkable.  CTA head was negative. CTA head and neck showed no clinically significant stenosis, large vessel occlusion or aneurysm.  EKG showed sinus rhythm with nonspecific T-wave abnormality.    Neurology Consult:  Patient was seen examined by me today.  She is a 62-year-old woman with history of uncontrolled diabetes, hypertension, stroke, and COPD who presented to the ED the with double vision, right-sided weakness and numbness.  She states that her symptoms began the morning prior to admission and she tried to rest, but symptoms persisted and she decided to present to the emergency room.  Her blood pressure was very elevated with systolics in the 200s.  CT brain showed no acute bleeding, CTA was negative for large vessel occlusion.  Patient was transferred to  [facility] for stroke workup and management.    Review of Systems:    A comprehensive ROS was performed and all systems were negative except as noted above in HPI.    Past Medical History:  has a past medical history of Arthritis, Complete tear of left rotator cuff (11/09/2017), COPD (chronic obstructive pulmonary disease), COVID-19 infection (07/02/2021), Diabetes mellitus, H/o Cerebrovascular accident (CVA) of left pontine structure (12/12/2019), Hypertension, Personal history of colonic polyps, Stroke, and Wears glasses.    Past Surgical History:  has a past surgical history that includes Shoulder surgery; Hysterectomy; Colonoscopy (N/A, 4/11/2017); and Oophorectomy.    Family Medical History: family history includes Anemia in her daughter; Asthma in her mother; Diabetes in her brother, father, and mother; Hypertension in her brother and mother.    Social History:  reports that she has never smoked. She has never used smokeless tobacco. She reports that she does not drink alcohol and does not use drugs.    PCP: Cristina Ren MD    Allergies: Review of patient's allergies indicates:  No Known Allergies    Medications:   No current facility-administered medications on file prior to encounter.     Current Outpatient Medications on File Prior to Encounter   Medication Sig Dispense Refill    aspirin 81 MG Chew Take 1 tablet (81 mg total) by mouth once daily. 90 tablet 3    atorvastatin (LIPITOR) 40 MG tablet Take 1 tablet (40 mg total) by mouth once daily. 90 tablet 3    blood glucose control, low Soln Use as directed (Patient taking differently: 1 each by Misc.(Non-Drug; Combo Route) route. Use as directed) 1 each 1    blood sugar diagnostic (TRUE METRIX GLUCOSE TEST STRIP) Strp Inject 100 strips into the skin 2 (two) times a day. (Patient taking differently: Inject 1 strip into the skin 2 (two) times a day.) 100 strip 2    chlorthalidone (HYGROTEN) 50 MG Tab Take 1 tablet (50 mg total) by mouth once  "daily. 90 tablet 3    cloNIDine 0.1 mg/24 hr td ptwk (CATAPRES) 0.1 mg/24 hr Place 1 patch onto the skin every 7 days. 12 patch 3    ergocalciferol (ERGOCALCIFEROL) 50,000 unit Cap Take 50,000 Units by mouth every 7 days.      hydrALAZINE (APRESOLINE) 100 MG tablet Take 1 tablet (100 mg total) by mouth 3 (three) times daily. 270 tablet 3    insulin aspart U-100 (NOVOLOG FLEXPEN U-100 INSULIN) 100 unit/mL (3 mL) InPn pen Inject 12 Units into the skin 3 (three) times daily before meals. 12 units before meals + correction scale. Max daily dose of 60 units. 45 mL 3    insulin degludec (TRESIBA FLEXTOUCH U-100) 100 unit/mL (3 mL) insulin pen Inject 40 Units into the skin once daily. 15 pen 3    lancets (TRUEPLUS LANCETS) 33 gauge Misc Inject 100 lancets into the skin 2 (two) times daily. (Patient taking differently: Inject 1 lancet into the skin 2 (two) times daily.) 100 each 3    lancets Misc To check BG 2 times daily, to use with insurance preferred meter (Patient taking differently: 1 lancet by Misc.(Non-Drug; Combo Route) route 2 (two) times a day. To check BG 2 times daily, to use with insurance preferred meter) 200 each 3    metoprolol succinate (TOPROL-XL) 100 MG 24 hr tablet Take 1 tablet (100 mg total) by mouth once daily. 90 tablet 3    pen needle, diabetic (BD MI 2ND GEN PEN NEEDLE) 32 gauge x 5/32" Ndle Uses 4 daily (Patient taking differently: 1 pen by Misc.(Non-Drug; Combo Route) route 4 (four) times daily. Uses 4 daily) 400 each 4    umeclidinium (INCRUSE ELLIPTA) 62.5 mcg/actuation inhalation capsule Inhale 62.5 mcg into the lungs once daily. Controller      [DISCONTINUED] blood-glucose meter (TRUE METRIX GLUCOSE METER) kit Use as instructed 1 each 0    [DISCONTINUED] lancing device (TRUEDRAW LANCING DEVICE) Misc Inject 1 Device into the skin 2 (two) times daily. 1 each 3     Scheduled Meds:   aspirin  81 mg Oral Daily    atorvastatin  40 mg Oral Daily    enoxaparin  40 mg Subcutaneous " Daily    insulin detemir U-100  40 Units Subcutaneous Daily    metoprolol succinate  100 mg Oral Daily    polyethylene glycol  17 g Oral Daily    tiotropium bromide  2 puff Inhalation Daily     Continuous Infusions:   niCARdipine       PRN Meds:.dextrose 10%, dextrose 10%, glucagon (human recombinant), glucose, glucose, insulin aspart U-100, sodium chloride 0.9%      Physical Exam  Current Vitals:  Vitals:    05/19/22 1007   BP:    Pulse: 78   Resp: 18   Temp:        Physical Exam:  General: AO x4  HEENT: PERRL, left 3rd cranial nerve palsy  CV: RRR  Lungs:  Decreased breath sounds  Abdomen: +BS    Neurological Exam    MENTAL STATUS EXAM:  Level of alertness: Alert  Level of attention: Attentive w/out deficit  Orientation/Awareness: intact to person, place, time, situation  Language: fluent but slurred. Comprehension/repetition/naming intact    CRANIAL NERVE EXAM:  II/III: fundoscopic exam deferred, PERRL; visual fields full to confrontation  III/IV/VI:  There is left-sided 3rd nerve palsy, horizontal nystagmus seen  V:  Right-sided numbness in face  VII: no facial asymmetry noted  VIII: no deficits in hearing bilaterally  IX/X: palate @ ML and raises symmetrically  XI: shoulder shrug 5/5 bilaterally; head turn 5/5 bilaterally  XII: tongue to midline w/out asymmetry    MOTOR EXAM:  Bulk and Tone: normal throughout  Strength is 5/5 proximally and distally in left upper and lower extremities without deficits.  Strength is 4+ out of 5 in right upper and lower extremity.  Right-sided pronator drift seen.    REFLEXES:  [place] (CN V) Not Tested  1+ in bilateral upper and lower extremities, no Evans's, no clonus. Downgoing plantars.      SENSORY EXAM  Mildly decreased sensation to light touch in right upper lower extremity.  Rest intact throughout.    COORDINATION/CEREBELLAR EXAM:  FTN: no dysmetria or other signs of appendicular ataxia  HTS: No evidence of appendicular ataxia    GAIT:  Deferred for safety.    [facility]  Stroke Scale      Date: 2022  Time: 1500  Person Administering Scale: Jennifer Tony MD    Administer stroke scale items in the order listed. Record performance in each category after each subscale exam. Do not go back and change scores. Follow directions provided for each exam technique. Scores should reflect what the patient does, not what the clinician thinks the patient can do. The clinician should record answers while administering the exam and work quickly. Except where indicated, the patient should not be coached (i.e., repeated requests to patient to make a special effort).      1a  Level of consciousness: 0=alert; keenly responsive   1b. LOC questions:  0=Answers both tasks correctly   1c. LOC commands: 0=Answers both tasks correctly   2.  Best Gaze: 1=partial gaze palsy   3.  Visual: 0=No visual loss   4. Facial Palsy: 1=Minor paralysis (flattened nasolabial fold, asymmetric on smiling)   5a.  Motor left arm: 0=No drift, limb holds 90 (or 45) degrees for full 10 seconds   5b.  Motor right arm: 1=Drift, limb holds 90 (or 45) degrees but drifts down before full 10 seconds: does not hit bed   6a. motor left le=No drift, limb holds 90 (or 45) degrees for full 10 seconds   6b  Motor right le=No drift, limb holds 90 (or 45) degrees for full 10 seconds   7. Limb Ataxia: 0=Absent   8.  Sensory: 1=Mild to moderate sensory loss; patient feels pinprick is less sharp or is dull on the affected side; there is a loss of superficial pain with pinprick but patient is aware She is being touched   9. Best Language:  0=No aphasia, normal   10. Dysarthria: 1=Mild to moderate, patient slurs at least some words and at worst, can be understood with some difficulty   11. Extinction and Inattention: 0=No abnormality    Total:   5       Laboratory Data & Studies    Recent Labs   Lab 22  0312   WBC 11.01 11.35   HGB 13.2 13.1    444   MCV 80* 80*       Recent Labs   Lab 22  1716  05/19/22  0312    139   K 4.2 3.8    105   CO2 22* 22*   BUN 23 22   * 152*   CALCIUM 8.9 9.0   MG  --  2.0   PHOS  --  3.0       Recent Labs   Lab 05/18/22 1716 05/19/22  0312   PROT 7.6 7.3   ALBUMIN 3.8 3.9   BILITOT 0.6 0.9   AST 24 20   ALT 30 25   ALKPHOS 69 66       Recent Labs   Lab 05/18/22 1716   LABPT 13.4   INR 1.1       Recent Labs   Lab 05/18/22 1716 05/18/22  1721 05/18/22  2230   HGBA1C  --   --  7.2*   CHOL  --  192  --    TRIG  --  47  --    LDLCALC  --  87.6  --    HDL  --  95*  --    TSH 1.390  --   --        Imaging:  CT Head Without Contrast    Result Date: 5/18/2022  CMS MANDATED QUALITY DATA - CT RADIATION 436 All CT scans at this facility utilize dose modulation, iterative reconstruction, and/or weight based dosing when appropriate to reduce radiation dose to as low as reasonably achievable. CLINICAL HISTORY: 62 years (1960) Female Neuro deficit, acute, stroke suspected TECHNIQUE: CT HEAD WITHOUT IV CONTRAST. 106 images obtained. Axial CT of the brain without contrast using soft tissue and bone algorithm. Please note in the acute setting if there is a clinical concern for an acute stroke MRI would be more sensitive/specific for evaluation of ischemia. COMPARISON: Most recent CT from January 6, 2022. FINDINGS: No acute intracranial hemorrhage, hydrocephalus, herniation or midline shift and the basal and suprasellar cisterns are patent. No acute skull fracture is identified. Mild diffuse cerebral atrophy for age with periventricular deep cerebral white matter low attenuation, a nonspecific finding in this age group which can be seen in any diffuse white matter process but which is most commonly associated with chronic microvascular ischemic disease. There are tiny rounded defects in the basal ganglia compatible with prior lacunar infarcts. There are scattered atheromatous calcifications in the intracranial internal carotid arteries. Orbital contents appear within  normal limits. External auditory canals are unremarkable. The visualized paranasal sinuses and mastoid air cells are essentially clear. IMPRESSION: 1. No acute intracranial process. 2. Chronic/involutional findings as noted above. . Electronically signed by:  Serg Brambila MD  5/18/2022 5:57 PM CDT Workstation: [id number]    X-Ray Chest AP Portable    Result Date: 5/18/2022  CLINICAL HISTORY: 62 years (1960) Female CHF chf TECHNIQUE: Portable AP radiograph the chest. COMPARISON: Radiograph from July 2, 2021 FINDINGS: The lungs are clear. Costophrenic angles are seen without effusion. No pneumothorax is identified. The heart is normal in size. The mediastinum is within normal limits. Osseous structures show degenerative disc disease and degenerative changes in the shoulders. The visualized upper abdomen is unremarkable. IMPRESSION: No acute cardiac or pulmonary process. . Electronically signed by:  Serg Brambila MD  5/18/2022 5:36 PM CDT Workstation: [id number]    CTA Head and Neck (xpd)    Result Date: 5/18/2022  CMS MANDATED QUALITY DATA - CT RADIATION 436 All CT scans at this facility utilize dose modulation, iterative reconstruction, and/or weight based dosing when appropriate to reduce radiation dose to as low as reasonably achievable. CMS MANDATED QUALITY DATA - CAROTID - 195 All measurements and percent stenosis described below were determined using NASCET criteria or criteria similar to NASCET, as defined by the Society of Radiologists in Ultrasound Consensus Conference, Radiology, 2003 CLINICAL HISTORY: 62 years (1960) Female Stroke, hemorrhagic TECHNIQUE: CT HEAD NECK ANGIOGRAPHY WITH IV CONTRAST. 428 images obtained. CT angiography of the head and neck was performed using thin axial slices respectively and reviewed in multiple planes. Two and three dimensional multiplanar reformatted images were generated and submitted to PACS. POST PROCESSING: (Vitrea) 3D advanced post-processing was  performed by the interpreting physician using an independent workstation utilizing a combination of MIP and MPR techniques to better evaluate anatomy and disease process.  3D rendering was performed with physician participation and supervision. CONTRAST: 100  mL Omni 350 was injected intravenously. COMPARISON: None available. FINDINGS: CTA of the [place]: There is patency of the intracranial circulation including the bilateral internal carotid arteries, the carotid terminus, the A1 and M1 segments, the bilateral intracranial vertebral arteries, the basilar artery and its distal branches. The posterior communicating arteries are patent. No aneurysm is identified within the limits of the technique. Conventional angiography is more sensitive for the detection of small aneurysms. CTA of the Neck: There is a three-vessel aortic arch. CTA of the neck demonstrates that the origins of the great vessels are widely patent. No aneurysm is seen. The carotid arteries are tortuous and the distal common carotid, carotid bulb and proximal internal carotid arteries course behind the oropharynx. RIGHT: Common carotid artery origin: Patent. Cervical segment: Patent. External carotid origin characteristics: Patent. Carotid bifurcation: Patent. Internal carotid origin characteristics: No focal stenosis. Vertebral artery: within normal limits. LEFT Common carotid artery origin: Patent. Cervical segment: Patent. External carotid origin characteristics: Patent. Carotid bifurcation: Patent. Internal carotid origin characteristics: No focal stenosis. Vertebral artery: within normal limits. IMPRESSION: No clinically significant stenosis, large vessel occlusion or aneurysm is identified as outlined above. . Electronically signed by:  Serg Brambila MD  5/18/2022 6:00 PM CDT Workstation: [id number]      Assessment:  62-year-old woman with history of uncontrolled diabetes, hypertension, stroke, and COPD who presented to the ED the  with double vision, right-sided weakness and numbness concerning for brainstem stroke.  She was outside of the window for tPA.  Blood pressure was very elevated with systolics in the 200s.  NIH stroke scale is 5. Admitted for stroke workup and management.    Stroke workup:  CTH:  No acute intracranial abnormality  CTA head and neck:  No large vessel occlusion  MRI brain:  Ordered and pending  Risk factors: A1C, TSH, LDL  Echocardiogram:  Ordered and pending    Plan:  - Admitted to hospital medicine with q4 hour neuro checks, on telemetry, continuous pulse oximetry  - Diet after eval per SLP.  - Secondary stroke prevention with plavix 75 mg daily, atorvastatin 80 mg daily. Should avoid anticoagulation if possible in the setting of cerebral microhemorrhages  - Risk factor stratification with HgbA1C, Fasting Lipid profile, TSH  - 2D echocardiogram as above  - Maintain Euthermia with Tylenol prn temp > 37.2 degrees C.  - Assessment for rehab with PT/OT/SLP evaluation and treatment.  - Permissive HTN systolic BP goal up to 220, diastolic up to 110 for 24H, then lower BP slowly to normotension  - Cardiac enzymes and EKG   - Will follow along    Patient was counseled in stroke signs, symptoms and risk factors. He/She was instructed to call 911 immediately if experiencing acute neurological deficits.  Patient counseled in healthy diet and physical activity. Importance of medication adherence and follow-up were emphasized.    · DVT prophylaxis with chemo/SCD prophylaxis  · Extensively discussed lifestyle modifications as prophylactic measures for stroke prevention including smoking cessation, adequate blood pressure management, healthy diet and regular exercise.    Patient to follow up with [facility] at [phone] within 3 days from discharge.     Stroke education was provided including stroke risk factors modification and any acute neurological changes including weakness, confusion, visual changes to come  straight to the ER.     All questions were answered.                              Thank you kindly for including us in the care of this patient. Please do not hesitate to contact us with any questions.    65 minutes of  care time has been spent evaluating with the patient. Time includes chart review not limited to diagnostic imaging, labs, and vitals, patient assessment, discussion with family and nursing, current order evaluations, and new order entries.      Jennifer Tony MD  Neurology  Cellphone: [phone]

## 2022-05-19 NOTE — CARE UPDATE
05/18/22 2227   Patient Assessment/Suction   Respiratory Effort Unlabored   Rhythm/Pattern, Respiratory pattern regular   PRE-TX-O2   O2 Device (Oxygen Therapy) room air   SpO2 97 %   Pulse Oximetry Type Continuous   $ Pulse Oximetry - Multiple Charge Pulse Oximetry - Multiple   Pulse 74   Resp 15   Respiratory Evaluation   $ Care Plan Tech Time 15 min   $ Eval/Re-eval Charges Evaluation   Evaluation For New Orders

## 2022-05-19 NOTE — ASSESSMENT & PLAN NOTE
Hemoglobin A1c 12.7 on 01/06/2022  Hemoglobin A1c requested  Continue basal insulin  Monitor blood glucose  Low-dose sliding scale insulin

## 2022-05-19 NOTE — PROGRESS NOTES
Nurse unable to come to mri to monitor pt on  blood pressure medicine at this time. Will try attempt mri when nurse is available.

## 2022-05-19 NOTE — SUBJECTIVE & OBJECTIVE
Interval History:     Review of Systems  As per subjective  Objective:     Vital Signs (Most Recent):  Temp: 97.6 °F (36.4 °C) (05/19/22 1059)  Pulse: 71 (05/19/22 1200)  Resp: 16 (05/19/22 1200)  BP: (!) 172/75 (05/19/22 1200)  SpO2: 98 % (05/19/22 1200)   Vital Signs (24h Range):  Temp:  [97.6 °F (36.4 °C)-98.2 °F (36.8 °C)] 97.6 °F (36.4 °C)  Pulse:  [] 71  Resp:  [9-29] 16  SpO2:  [94 %-100 %] 98 %  BP: (140-245)/() 172/75     Weight: 60 kg (132 lb 4.4 oz)  Body mass index is 26.72 kg/m².    Intake/Output Summary (Last 24 hours) at 5/19/2022 1547  Last data filed at 5/19/2022 0900  Gross per 24 hour   Intake 240 ml   Output 200 ml   Net 40 ml      Physical Exam  Vitals and nursing note reviewed.   HENT:      Head: Normocephalic and atraumatic.   Eyes:      Conjunctiva/sclera: Conjunctivae normal.   Neck:      Vascular: No JVD.   Cardiovascular:      Heart sounds: Normal heart sounds.   Pulmonary:      Effort: Pulmonary effort is normal.   Abdominal:      Palpations: Abdomen is soft.   Skin:     General: Skin is warm.   Neurological:      Mental Status: She is alert and oriented to person, place, and time.   Psychiatric:         Mood and Affect: Mood normal.       Significant Labs: All pertinent labs within the past 24 hours have been reviewed.  Cardiac Markers:   Recent Labs   Lab 05/18/22 2036   BNP 32     Coagulation:   Recent Labs   Lab 05/18/22  1716   INR 1.1     Lactic Acid: No results for input(s): LACTATE in the last 48 hours.    Significant Imaging: I have reviewed all pertinent imaging results/findings within the past 24 hours.

## 2022-05-19 NOTE — SUBJECTIVE & OBJECTIVE
Past Medical History:   Diagnosis Date    Arthritis     Complete tear of left rotator cuff 11/09/2017    COPD (chronic obstructive pulmonary disease)     COVID-19 infection 07/02/2021    Diabetes mellitus     H/o Cerebrovascular accident (CVA) of left pontine structure 12/12/2019    Hypertension     Personal history of colonic polyps     Stroke     Wears glasses        Past Surgical History:   Procedure Laterality Date    COLONOSCOPY N/A 4/11/2017    Procedure: COLONOSCOPY;  Surgeon: Jared Antonio MD;  Location: [facility];  Service: Endoscopy;  Laterality: N/A;    HYSTERECTOMY      OOPHORECTOMY      SHOULDER SURGERY      R       Review of patient's allergies indicates:  No Known Allergies    No current facility-administered medications on file prior to encounter.     Current Outpatient Medications on File Prior to Encounter   Medication Sig    aspirin 81 MG Chew Take 1 tablet (81 mg total) by mouth once daily.    atorvastatin (LIPITOR) 40 MG tablet Take 1 tablet (40 mg total) by mouth once daily.    blood glucose control, low Soln Use as directed (Patient taking differently: 1 each by Misc.(Non-Drug; Combo Route) route. Use as directed)    blood sugar diagnostic (TRUE METRIX GLUCOSE TEST STRIP) Strp Inject 100 strips into the skin 2 (two) times a day. (Patient taking differently: Inject 1 strip into the skin 2 (two) times a day.)    chlorthalidone (HYGROTEN) 50 MG Tab Take 1 tablet (50 mg total) by mouth once daily.    cloNIDine 0.1 mg/24 hr td ptwk (CATAPRES) 0.1 mg/24 hr Place 1 patch onto the skin every 7 days.    ergocalciferol (ERGOCALCIFEROL) 50,000 unit Cap Take 50,000 Units by mouth every 7 days.    hydrALAZINE (APRESOLINE) 100 MG tablet Take 1 tablet (100 mg total) by mouth 3 (three) times daily.    insulin aspart U-100 (NOVOLOG FLEXPEN U-100 INSULIN) 100 unit/mL (3 mL) InPn pen Inject 12 Units into the skin 3 (three) times daily before meals. 12 units before meals + correction scale. Max daily dose of  "60 units.    insulin degludec (TRESIBA FLEXTOUCH U-100) 100 unit/mL (3 mL) insulin pen Inject 40 Units into the skin once daily.    lancets (TRUEPLUS LANCETS) 33 gauge Misc Inject 100 lancets into the skin 2 (two) times daily. (Patient taking differently: Inject 1 lancet into the skin 2 (two) times daily.)    lancets [facility] To check BG 2 times daily, to use with insurance preferred meter (Patient taking differently: 1 lancet by Misc.(Non-Drug; Combo Route) route 2 (two) times a day. To check BG 2 times daily, to use with insurance preferred meter)    metoprolol succinate (TOPROL-XL) 100 MG 24 hr tablet Take 1 tablet (100 mg total) by mouth once daily.    pen needle, diabetic (BD MI 2ND GEN PEN NEEDLE) 32 gauge x 5/32" Ndle Uses 4 daily (Patient taking differently: 1 pen by Misc.(Non-Drug; Combo Route) route 4 (four) times daily. Uses 4 daily)    umeclidinium (INCRUSE ELLIPTA) 62.5 mcg/actuation inhalation capsule Inhale 62.5 mcg into the lungs once daily. Controller    [DISCONTINUED] alcohol swabs (BD ALCOHOL SWABS) PadM Apply 1 each topically 2 (two) times a day.    [DISCONTINUED] blood-glucose meter (TRUE METRIX GLUCOSE METER) kit Use as instructed    [DISCONTINUED] fluticasone-umeclidin-vilanter (TRELEGY ELLIPTA) 100-62.5-25 mcg DsDv Inhale 1 puff into the lungs once daily.    [DISCONTINUED] lancing device (TRUEDRAW LANCING DEVICE) Misc Inject 1 Device into the skin 2 (two) times daily.    [DISCONTINUED] pen needle, diabetic (BD MI 2ND GEN PEN NEEDLE) 32 gauge x 5/32" Ndle Inject daily     Family History       Problem Relation (Age of Onset)    Anemia Daughter    Asthma Mother    Diabetes Mother, Father, Brother    Hypertension Mother, Brother          Tobacco Use    Smoking status: Never Smoker    Smokeless tobacco: Never Used   Substance and Sexual Activity    Alcohol use: No    Drug use: No    Sexual activity: Not Currently     Review of Systems   All other systems reviewed and are negative.  Objective: "     Vital Signs (Most Recent):  Temp: 97.9 °F (36.6 °C) (05/18/22 1703)  Pulse: 82 (05/18/22 2030)  Resp: (!) 22 (05/18/22 2031)  BP: (!) 177/73 (05/18/22 2030)  SpO2: 97 % (05/18/22 2030)   Vital Signs (24h Range):  Temp:  [97.9 °F (36.6 °C)] 97.9 °F (36.6 °C)  Pulse:  [76-91] 82  Resp:  [14-29] 22  SpO2:  [97 %-100 %] 97 %  BP: (158-245)/() 177/73     Weight: 51.7 kg (114 lb)  Body mass index is 23.03 kg/m².    Physical Exam  Constitutional:       Appearance: She is not ill-appearing or diaphoretic.   HENT:      Head: Normocephalic and atraumatic.      Right Ear: External ear normal.      Left Ear: External ear normal.      Nose: Nose normal.      Mouth/Throat:      Mouth: Mucous membranes are moist.   Eyes:      Conjunctiva/sclera: Conjunctivae normal.   Cardiovascular:      Rate and Rhythm: Normal rate and regular rhythm.      Pulses: Normal pulses.      Heart sounds: Normal heart sounds.   Pulmonary:      Effort: Pulmonary effort is normal.      Breath sounds: Normal breath sounds.   Abdominal:      General: Bowel sounds are normal.      Palpations: Abdomen is soft.   Genitourinary:     Comments: No del rosario  Musculoskeletal:      Cervical back: Normal range of motion and neck supple.      Right lower leg: No edema.      Left lower leg: No edema.   Skin:     General: Skin is warm and dry.   Neurological:      General: No focal deficit present.      Mental Status: She is alert and oriented to person, place, and time.   Psychiatric:         Mood and Affect: Mood normal.         Behavior: Behavior normal.           Significant Labs: All pertinent labs within the past 24 hours have been reviewed.  CBC:   Recent Labs   Lab 05/18/22 2036   WBC 11.01   HGB 13.2   HCT 38.2        CMP:   Recent Labs   Lab 05/18/22  1716      K 4.2      CO2 22*   *   BUN 23   CREATININE 0.9   CALCIUM 8.9   PROT 7.6   ALBUMIN 3.8   BILITOT 0.6   ALKPHOS 69   AST 24   ALT 30   ANIONGAP 10   EGFRNONAA >60.0    Coagulation:   Recent Labs   Lab 05/18/22  1716   INR 1.1   Lipid Panel:   Recent Labs   Lab 05/18/22  1721   CHOL 192   HDL 95*   LDLCALC 87.6   TRIG 47   CHOLHDL 49.5       Troponin:   Recent Labs   Lab 05/18/22 1716   TROPONINI <0.030     TSH:   Recent Labs   Lab 05/18/22 1716   TSH 1.390       Significant Imaging: I have reviewed all pertinent imaging results/findings within the past 24 hours.  CT Head Without Contrast    Result Date: 5/18/2022  CMS MANDATED QUALITY DATA - CT RADIATION 436 All CT scans at this facility utilize dose modulation, iterative reconstruction, and/or weight based dosing when appropriate to reduce radiation dose to as low as reasonably achievable. CLINICAL HISTORY: 62 years (1960) Female Neuro deficit, acute, stroke suspected TECHNIQUE: CT HEAD WITHOUT IV CONTRAST. 106 images obtained. Axial CT of the brain without contrast using soft tissue and bone algorithm. Please note in the acute setting if there is a clinical concern for an acute stroke MRI would be more sensitive/specific for evaluation of ischemia. COMPARISON: Most recent CT from January 6, 2022. FINDINGS: No acute intracranial hemorrhage, hydrocephalus, herniation or midline shift and the basal and suprasellar cisterns are patent. No acute skull fracture is identified. Mild diffuse cerebral atrophy for age with periventricular deep cerebral white matter low attenuation, a nonspecific finding in this age group which can be seen in any diffuse white matter process but which is most commonly associated with chronic microvascular ischemic disease. There are tiny rounded defects in the basal ganglia compatible with prior lacunar infarcts. There are scattered atheromatous calcifications in the intracranial internal carotid arteries. Orbital contents appear within normal limits. External auditory canals are unremarkable. The visualized paranasal sinuses and mastoid air cells are essentially clear. IMPRESSION: 1. No acute  intracranial process. 2. Chronic/involutional findings as noted above. . Electronically signed by:  Serg Brambila MD  5/18/2022 5:57 PM CDT Workstation: [id number]    X-Ray Chest AP Portable    Result Date: 5/18/2022  CLINICAL HISTORY: 62 years (1960) Female CHF chf TECHNIQUE: Portable AP radiograph the chest. COMPARISON: Radiograph from July 2, 2021 FINDINGS: The lungs are clear. Costophrenic angles are seen without effusion. No pneumothorax is identified. The heart is normal in size. The mediastinum is within normal limits. Osseous structures show degenerative disc disease and degenerative changes in the shoulders. The visualized upper abdomen is unremarkable. IMPRESSION: No acute cardiac or pulmonary process. . Electronically signed by:  Serg Brambila MD  5/18/2022 5:36 PM CDT Workstation: [id number]    CTA Head and Neck (xpd)    Result Date: 5/18/2022  CMS MANDATED QUALITY DATA - CT RADIATION 436 All CT scans at this facility utilize dose modulation, iterative reconstruction, and/or weight based dosing when appropriate to reduce radiation dose to as low as reasonably achievable. CMS MANDATED QUALITY DATA - CAROTID - 195 All measurements and percent stenosis described below were determined using NASCET criteria or criteria similar to NASCET, as defined by the Society of Radiologists in Ultrasound Consensus Conference, Radiology, 2003 CLINICAL HISTORY: 62 years (1960) Female Stroke, hemorrhagic TECHNIQUE: CT HEAD NECK ANGIOGRAPHY WITH IV CONTRAST. 428 images obtained. CT angiography of the head and neck was performed using thin axial slices respectively and reviewed in multiple planes. Two and three dimensional multiplanar reformatted images were generated and submitted to PACS. POST PROCESSING: (Vitrea) 3D advanced post-processing was performed by the interpreting physician using an independent workstation utilizing a combination of MIP and MPR techniques to better evaluate anatomy and disease  process.  3D rendering was performed with physician participation and supervision. CONTRAST: 100  mL Omni 350 was injected intravenously. COMPARISON: None available. FINDINGS: CTA of the [place]: There is patency of the intracranial circulation including the bilateral internal carotid arteries, the carotid terminus, the A1 and M1 segments, the bilateral intracranial vertebral arteries, the basilar artery and its distal branches. The posterior communicating arteries are patent. No aneurysm is identified within the limits of the technique. Conventional angiography is more sensitive for the detection of small aneurysms. CTA of the Neck: There is a three-vessel aortic arch. CTA of the neck demonstrates that the origins of the great vessels are widely patent. No aneurysm is seen. The carotid arteries are tortuous and the distal common carotid, carotid bulb and proximal internal carotid arteries course behind the oropharynx. RIGHT: Common carotid artery origin: Patent. Cervical segment: Patent. External carotid origin characteristics: Patent. Carotid bifurcation: Patent. Internal carotid origin characteristics: No focal stenosis. Vertebral artery: within normal limits. LEFT Common carotid artery origin: Patent. Cervical segment: Patent. External carotid origin characteristics: Patent. Carotid bifurcation: Patent. Internal carotid origin characteristics: No focal stenosis. Vertebral artery: within normal limits. IMPRESSION: No clinically significant stenosis, large vessel occlusion or aneurysm is identified as outlined above. . Electronically signed by:  Serg Brambila MD  5/18/2022 6:00 PM CDT Workstation: [id number]

## 2022-05-19 NOTE — HOSPITAL COURSE
HPI: 62-year-old, nonsmoker,  female with history of diabetes mellitus (hemoglobin A1c 12.7 on 01/06/2022), hypertension, stroke in 2019, COPD, arthritis  Presented to the emergency department with right-sided weakness and blurry vision  Reports woke up at 8:00 a.m. yesterday morning with right-sided weakness and blurry vision in the right eye  Denies fever, chills, shortness of breath, chest pain, abdominal pain, nausea, vomiting, diarrhea, urinary symptoms  Reports taking her medication as prescribed. States that her catapress patch fell off a few days ago.     Noted to be hypertensive with systolic blood pressure 162-245. Work up in the ED was unremarkable.  CTA head was negative. CTA head and neck showed no clinically significant stenosis, large vessel occlusion or aneurysm.  EKG showed sinus rhythm with nonspecific T-wave abnormality.       Hospital course  Patient was admitted with severely elevated hypertensive emergency with possible TIA symptoms.  Her blood pressure was only minimally treated at admission because of suspicion for stroke.  Neurology reviewed.  Stroke workup was negative except her previous stroke finding in MRI.  Later her pressure was aggressively treated and blood pressure was acceptable and discharged in stable condition.  Echo was repeated and normal and no intracardiac shunt.  Symptoms appear to be from hypertensive emergency and later medication were adjusted.  She was on chlorthalidone and replaced with Lasix.  Restarted her clonidine patch and appointment given with Dr. Magana neurologist for follow-up.  She was advised to see primary care doctor as soon as possible for blood pressure check.  Her aspirin was replaced with Plavix.

## 2022-05-19 NOTE — PLAN OF CARE
Goals to be met by: 22     Patient will increase functional independence with mobility by performin. Supine to sit with Supervision  2. Sit to stand transfer with Supervision  3. Bed to chair transfer with Supervision using Rolling Walker  4. Gait  x 150 feet with Supervision using Rolling Walker.

## 2022-05-19 NOTE — PT/OT/SLP EVAL
Speech Language Pathology Evaluation  Bedside Swallow    Patient Name:  Steff Johnson   MRN:  [id number]  Admitting Diagnosis: Acute right-sided weakness    Recommendations:                 General Recommendations:    · Follow pending workup/MRI: no signs of acute deficits related to motor speech, cognitive communication or swallowing     Diet recommendations:  Regular, Thin   Aspiration Precautions: Standard aspiration precautions   General Precautions: Standard,    Communication strategies:  none    History:     Past Medical History:   Diagnosis Date    Arthritis     Complete tear of left rotator cuff 11/09/2017    COPD (chronic obstructive pulmonary disease)     COVID-19 infection 07/02/2021    Diabetes mellitus     H/o Cerebrovascular accident (CVA) of left pontine structure 12/12/2019    Hypertension     Personal history of colonic polyps     Stroke     Wears glasses        Past Surgical History:   Procedure Laterality Date    COLONOSCOPY N/A 4/11/2017    Procedure: COLONOSCOPY;  Surgeon: Jared Antonio MD;  Location: [facility];  Service: Endoscopy;  Laterality: N/A;    HYSTERECTOMY      OOPHORECTOMY      SHOULDER SURGERY      R       Principal Problem:Acute right-sided weakness     Chief Complaint:        Chief Complaint   Patient presents with    Blurred Vision       Patient reports blurry vision in R eye and R sided weakness since yesterday around 0900          HPI: 62-year-old, nonsmoker,  female with history of diabetes mellitus (hemoglobin A1c 12.7 on 01/06/2022), hypertension, stroke in 2019, COPD, arthritis  Presented to the emergency department with right-sided weakness and blurry vision  Reports woke up at 8:00 a.m. yesterday morning with right-sided weakness and blurry vision in the right eye  Denies fever, chills, shortness of breath, chest pain, abdominal pain, nausea, vomiting, diarrhea, urinary symptoms  Reports taking her medication as prescribed. States that her  reece patch fell off a few days ago.     Noted to be hypertensive with systolic blood pressure 162-245. Work up in the ED was unremarkable.  CTA head was negative. CTA head and neck showed no clinically significant stenosis, large vessel occlusion or aneurysm.  EKG showed sinus rhythm with nonspecific T-wave abnormality.    Imaging Results          CTA Head and Neck (xpd) (Final result)  Result time 05/18/22 18:00:44    Final result by Serg Brambila MD (05/18/22 18:00:44)                 Narrative:    CMS MANDATED QUALITY DATA - CT RADIATION 436    All CT scans at this facility utilize dose modulation, iterative reconstruction, and/or weight based dosing when appropriate to reduce radiation dose to as low as reasonably achievable.    CMS MANDATED QUALITY DATA - CAROTID - 195    All measurements and percent stenosis described below were determined using NASCET criteria or criteria similar to NASCET, as defined by the Society of Radiologists in Ultrasound Consensus Conference, Radiology, 2003    CLINICAL HISTORY:  62 years (1960) Female Stroke, hemorrhagic    TECHNIQUE:  CT HEAD NECK ANGIOGRAPHY WITH IV CONTRAST. 428 images obtained. CT angiography of the head and neck was performed using thin axial slices respectively and reviewed in multiple planes. Two and three dimensional multiplanar reformatted images were generated and submitted to PACS.    POST PROCESSING:  (Vitrea) 3D advanced post-processing was performed by the interpreting physician using an independent workstation utilizing a combination of MIP and MPR techniques to better evaluate anatomy and disease process.  3D rendering was performed with physician participation and supervision.    CONTRAST:  100  mL Omni 350 was injected intravenously.    COMPARISON:  None available.    FINDINGS:  CTA of the [place] of [place]: There is patency of the intracranial circulation including the bilateral internal carotid arteries, the carotid terminus, the A1  and M1 segments, the bilateral intracranial vertebral arteries, the basilar artery and its distal branches. The posterior communicating arteries are patent. No aneurysm is identified within the limits of the technique. Conventional angiography is more sensitive for the detection of small aneurysms.    CTA of the Neck:  There is a three-vessel aortic arch. CTA of the neck demonstrates that the origins of the great vessels are widely patent. No aneurysm is seen. The carotid arteries are tortuous and the distal common carotid, carotid bulb and proximal internal carotid arteries course behind the oropharynx.    RIGHT:  Common carotid artery origin: Patent.  Cervical segment: Patent.  External carotid origin characteristics: Patent.  Carotid bifurcation: Patent.  Internal carotid origin characteristics: No focal stenosis.  Vertebral artery: within normal limits.    LEFT  Common carotid artery origin: Patent.  Cervical segment: Patent.  External carotid origin characteristics: Patent.  Carotid bifurcation: Patent.  Internal carotid origin characteristics: No focal stenosis.  Vertebral artery: within normal limits.    IMPRESSION:  No clinically significant stenosis, large vessel occlusion or aneurysm is identified as outlined above.                    .    Electronically signed by:  Serg Brambila MD  5/18/2022 6:00 PM CDT Workstation: [id number]                             CT Head Without Contrast (Final result)  Result time 05/18/22 17:57:26    Final result by Serg Brambila MD (05/18/22 17:57:26)                 Narrative:    CMS MANDATED QUALITY DATA - CT RADIATION 436    All CT scans at this facility utilize dose modulation, iterative reconstruction, and/or weight based dosing when appropriate to reduce radiation dose to as low as reasonably achievable.    CLINICAL HISTORY:  62 years (1960) Female Neuro deficit, acute, stroke suspected    TECHNIQUE:  CT HEAD WITHOUT IV CONTRAST. 106 images obtained. Axial  CT of the brain without contrast using soft tissue and bone algorithm. Please note in the acute setting if there is a clinical concern for an acute stroke MRI would be more sensitive/specific for evaluation of ischemia.    COMPARISON:  Most recent CT from January 6, 2022.    FINDINGS:  No acute intracranial hemorrhage, hydrocephalus, herniation or midline shift and the basal and suprasellar cisterns are patent. No acute skull fracture is identified.    Mild diffuse cerebral atrophy for age with periventricular deep cerebral white matter low attenuation, a nonspecific finding in this age group which can be seen in any diffuse white matter process but which is most commonly associated with chronic microvascular ischemic disease. There are tiny rounded defects in the basal ganglia compatible with prior lacunar infarcts. There are scattered atheromatous calcifications in the intracranial internal carotid arteries.    Orbital contents appear within normal limits. External auditory canals are unremarkable. The visualized paranasal sinuses and mastoid air cells are essentially clear.    IMPRESSION:  1. No acute intracranial process.  2. Chronic/involutional findings as noted above.                  .    Electronically signed by:  Serg Brambila MD  5/18/2022 5:57 PM CDT Workstation: [id number]                             X-Ray Chest AP Portable (Final result)  Result time 05/18/22 17:36:45    Final result by Serg Brambila MD (05/18/22 17:36:45)                 Narrative:    CLINICAL HISTORY:  62 years (1960) Female CHF chf    TECHNIQUE:  Portable AP radiograph the chest.    COMPARISON:  Radiograph from July 2, 2021    FINDINGS:  The lungs are clear. Costophrenic angles are seen without effusion. No pneumothorax is identified. The heart is normal in size. The mediastinum is within normal limits. Osseous structures show degenerative disc disease and degenerative changes in the shoulders. The visualized upper  abdomen is unremarkable.    IMPRESSION:  No acute cardiac or pulmonary process.                  .            Electronically signed by:  Serg Brambila MD  5/18/2022 5:36 PM CDT Workstation: [id number]                              Seen by SLP 1/2022: Dysphagia, Dysarthria and Cognitive-Linguistic Impairment.  SLP recommended.          Subjective     Pt alert, denies impairments related to communication or swallowing   Patient goals: none stated      Pain/Comfort:  Pain Rating 1: 0/10    Respiratory Status: Room air    Objective:       Cognitive Communication:   · Alert, attentive to clinician t/o encounter.   · Oriented to month, year, place and situation   · Able to follow simple body part commands.   · Responds appropriately to open ended questions   · Verbal expression overall fluent with appropriate sentence structure and length  · Automatic speech: 100% acc  · Repetition: 100% acc  · Able to generate 15 and 9 items w/ in semantic category given 1 min time constraint.      Motor speech: Imprecise with reduced volume. Intermittent mild degree of hypernasality and/or air escape to nasopharynx. 100% intelligible.      Oral Musculature Evaluation  · Oral Musculature: gross asymmetry present (right sided facial droop) (chronic)   · Dentition: teeth in poor condition,scattered dentition  · Secretion Management: adequate  · Mandibular Strength and Mobility: closed mouth resting posture, able to open midline against resistance and able to lateralize   · Oral Labial Strength and Mobility: impaired retraction,impaired pursing on right   · Lingual Strength and Mobility:  deviation to right on protrusion (chronic)   · Velar Elevation:  (asymmetrical elevation with uvula deviating to right) (chronic)   · Buccal Strength and Mobility: flaccid on right (chronic)   · Volitional Cough: present with good effort   · Volitional Swallow: hyolaryngeal lift present to digital palpation   · Voice Prior to PO Intake: clear in nature  with reduced vocal intensity  · Oral Musculature Comments: Right sided facial droop at rest. Bilateral and equal sensation to forehead, cheek & jaw      Bedside Swallow Eval:   Consistencies Assessed:  · Thin liquid: ice chip x2,3oz via consecutive cup sips, self regulated cup and straw   · Solid: 1/2 chuckie cracker      Oral Phase:   · Intact labial seal to retrieve from cup edge and spoon stripping; able to siphon straw   · Sputtering on right once once bolus held intraorally with 1/3 straw     · Timely bolus formation, prompt transit  · Mild retention along tongue surface following dry solid, cleared w/ liquid wash      Pharyngeal Phase:   · No overt signs or symptoms of aspiration or airway threat  · Clear vocal quality maintained   · Completed uninterrupted drinking of 3 oz water with no overt s/s of aspiration (i.e., immediate coughing or choking), passing 3oz water challenge. Risk of aspiration not indicated although cannot be ruled out at bedside.      Compensatory Strategies  · Liquid wash to clear dry solid residue       Assessment:     Steff Johnson is a 62 y.o. female with an SLP diagnosis of chronic dysarthria, oral dysphagia, signs of cognitive communication impairment. Assessment this date consistent with findings in 2019. No needs apparent at this time. Will follow should MRI reveal acute stroke.     Plan:     · Plan of Care reviewed with:  patient, family   · SLP Follow-Up:   (pending MRI; no signs of acute deficits)       Discharge recommendations: pending; no needs suspected (chronic deficits, 2019)     Time Tracking:     SLP Treatment Date:   05/19/22  Speech Start Time:  1111  Speech Stop Time:  1128     Speech Total Time (min):  17 min    Billable Minutes: Eval Swallow and Oral Function 17    05/19/2022

## 2022-05-19 NOTE — PLAN OF CARE
05/19/22 1007   Patient Assessment/Suction   Level of Consciousness (AVPU) alert   Respiratory Effort Normal;Unlabored   All Lung Fields Breath Sounds clear   Rhythm/Pattern, Respiratory pattern regular;depth regular;unlabored   PRE-TX-O2   O2 Device (Oxygen Therapy) room air   SpO2 97 %   Pulse Oximetry Type Continuous   $ Pulse Oximetry - Multiple Charge Pulse Oximetry - Multiple   Pulse 78   Resp 18   Inhaler   $ Inhaler Charges MDI (Metered Dose Inahler) Treatment   Daily Review of Necessity (Inhaler) completed   Respiratory Treatment Status (Inhaler) given;mouth rinsed post treatment   Treatment Route (Inhaler) mouthpiece   Patient Position (Inhaler) HOB elevated   Post Treatment Assessment (Inhaler) breath sounds improved   Signs of Intolerance (Inhaler) none   Education   $ Education Bronchodilator;15 min   Respiratory Evaluation   $ Care Plan Tech Time 15 min

## 2022-05-19 NOTE — CARE UPDATE
1115: SW made attempt to complete assessment and POTTER, therapy in room at the time. SW will try again at a later time.    1251: SW made second attempt to complete assessment and POTTER, however, patient eating lunch and asked SW to return at a later time.    1454: SW made third attempt to complete assessment and POTTER, however, MD in room with patient.

## 2022-05-20 VITALS
OXYGEN SATURATION: 99 % | HEIGHT: 59 IN | SYSTOLIC BLOOD PRESSURE: 175 MMHG | RESPIRATION RATE: 15 BRPM | WEIGHT: 132.25 LBS | DIASTOLIC BLOOD PRESSURE: 81 MMHG | TEMPERATURE: 98 F | BODY MASS INDEX: 26.66 KG/M2 | HEART RATE: 67 BPM

## 2022-05-20 LAB
ALBUMIN SERPL BCP-MCNC: 3.9 G/DL (ref 3.5–5.2)
ALP SERPL-CCNC: 75 U/L (ref 55–135)
ALT SERPL W/O P-5'-P-CCNC: 57 U/L (ref 10–44)
ANION GAP SERPL CALC-SCNC: 10 MMOL/L (ref 8–16)
AST SERPL-CCNC: 61 U/L (ref 10–40)
AV INDEX (PROSTH): 0.98
AV MEAN GRADIENT: 4 MMHG
AV VALVE AREA: 2.81 CM2
BASOPHILS # BLD AUTO: 0.05 K/UL (ref 0–0.2)
BASOPHILS NFR BLD: 0.5 % (ref 0–1.9)
BILIRUB SERPL-MCNC: 0.8 MG/DL (ref 0.1–1)
BSA FOR ECHO PROCEDURE: 1.47 M2
BUN SERPL-MCNC: 25 MG/DL (ref 8–23)
CALCIUM SERPL-MCNC: 9.1 MG/DL (ref 8.7–10.5)
CHLORIDE SERPL-SCNC: 102 MMOL/L (ref 95–110)
CO2 SERPL-SCNC: 24 MMOL/L (ref 23–29)
CREAT SERPL-MCNC: 1 MG/DL (ref 0.5–1.4)
DIFFERENTIAL METHOD: ABNORMAL
DOP CALC AO VTI: 26.07 CM
DOP CALC LVOT AREA: 2.9 CM2
DOP CALC LVOT DIAMETER: 1.91 CM
DOP CALC LVOT PEAK VEL: 117.94 M/S
DOP CALC LVOT STROKE VOLUME: 73.37 CM3
DOP CALCLVOT PEAK VEL VTI: 25.62 CM
E WAVE DECELERATION TIME: 232.06 MSEC
E/A RATIO: 0.89
E/E' RATIO: 15.27 M/S
EJECTION FRACTION: 65 %
EOSINOPHIL # BLD AUTO: 0.2 K/UL (ref 0–0.5)
EOSINOPHIL NFR BLD: 1.6 % (ref 0–8)
ERYTHROCYTE [DISTWIDTH] IN BLOOD BY AUTOMATED COUNT: 14 % (ref 11.5–14.5)
EST. GFR  (AFRICAN AMERICAN): >60 ML/MIN/1.73 M^2
EST. GFR  (NON AFRICAN AMERICAN): 59.7 ML/MIN/1.73 M^2
FRACTIONAL SHORTENING: 29 % (ref 28–44)
GLUCOSE SERPL-MCNC: 118 MG/DL (ref 70–110)
GLUCOSE SERPL-MCNC: 127 MG/DL (ref 70–110)
GLUCOSE SERPL-MCNC: 238 MG/DL (ref 70–110)
HCT VFR BLD AUTO: 39.3 % (ref 37–48.5)
HGB BLD-MCNC: 13.6 G/DL (ref 12–16)
IMM GRANULOCYTES # BLD AUTO: 0.04 K/UL (ref 0–0.04)
IMM GRANULOCYTES NFR BLD AUTO: 0.4 % (ref 0–0.5)
LEFT ATRIUM SIZE: 3.73 CM
LEFT INTERNAL DIMENSION IN SYSTOLE: 2.76 CM (ref 2.1–4)
LEFT VENTRICULAR INTERNAL DIMENSION IN DIASTOLE: 3.91 CM (ref 3.5–6)
LV LATERAL E/E' RATIO: 16.8 M/S
LV SEPTAL E/E' RATIO: 14 M/S
LYMPHOCYTES # BLD AUTO: 2.7 K/UL (ref 1–4.8)
LYMPHOCYTES NFR BLD: 28.6 % (ref 18–48)
MCH RBC QN AUTO: 27.7 PG (ref 27–31)
MCHC RBC AUTO-ENTMCNC: 34.6 G/DL (ref 32–36)
MCV RBC AUTO: 80 FL (ref 82–98)
MONOCYTES # BLD AUTO: 1 K/UL (ref 0.3–1)
MONOCYTES NFR BLD: 10.2 % (ref 4–15)
MV PEAK A VEL: 0.94 M/S
MV PEAK E VEL: 0.84 M/S
NEUTROPHILS # BLD AUTO: 5.6 K/UL (ref 1.8–7.7)
NEUTROPHILS NFR BLD: 58.7 % (ref 38–73)
NRBC BLD-RTO: 0 /100 WBC
PLATELET # BLD AUTO: 453 K/UL (ref 150–450)
PMV BLD AUTO: 10.1 FL (ref 9.2–12.9)
POTASSIUM SERPL-SCNC: 4 MMOL/L (ref 3.5–5.1)
PROT SERPL-MCNC: 7.7 G/DL (ref 6–8.4)
RBC # BLD AUTO: 4.91 M/UL (ref 4–5.4)
SODIUM SERPL-SCNC: 136 MMOL/L (ref 136–145)
TDI LATERAL: 0.05 M/S
TDI SEPTAL: 0.06 M/S
TDI: 0.06 M/S
TRICUSPID ANNULAR PLANE SYSTOLIC EXCURSION: 264 CM
WBC # BLD AUTO: 9.5 K/UL (ref 3.9–12.7)

## 2022-05-20 PROCEDURE — 80053 COMPREHEN METABOLIC PANEL: CPT | Performed by: NURSE PRACTITIONER

## 2022-05-20 PROCEDURE — 94761 N-INVAS EAR/PLS OXIMETRY MLT: CPT

## 2022-05-20 PROCEDURE — 97530 THERAPEUTIC ACTIVITIES: CPT

## 2022-05-20 PROCEDURE — 36415 COLL VENOUS BLD VENIPUNCTURE: CPT | Performed by: NURSE PRACTITIONER

## 2022-05-20 PROCEDURE — 85025 COMPLETE CBC W/AUTO DIFF WBC: CPT | Performed by: NURSE PRACTITIONER

## 2022-05-20 PROCEDURE — 25000003 PHARM REV CODE 250: Performed by: INTERNAL MEDICINE

## 2022-05-20 PROCEDURE — G0378 HOSPITAL OBSERVATION PER HR: HCPCS

## 2022-05-20 PROCEDURE — 25000003 PHARM REV CODE 250: Performed by: NURSE PRACTITIONER

## 2022-05-20 PROCEDURE — 94640 AIRWAY INHALATION TREATMENT: CPT

## 2022-05-20 RX ORDER — FUROSEMIDE 40 MG/1
20 TABLET ORAL DAILY
Qty: 90 TABLET | Refills: 0 | Status: SHIPPED | OUTPATIENT
Start: 2022-05-20 | End: 2022-08-02

## 2022-05-20 RX ORDER — CLOPIDOGREL BISULFATE 75 MG/1
75 TABLET ORAL DAILY
Qty: 120 TABLET | Refills: 0 | Status: SHIPPED | OUTPATIENT
Start: 2022-05-20 | End: 2022-11-09 | Stop reason: SDUPTHER

## 2022-05-20 RX ORDER — CLONIDINE 0.1 MG/24H
1 PATCH, EXTENDED RELEASE TRANSDERMAL
Status: DISCONTINUED | OUTPATIENT
Start: 2022-05-20 | End: 2022-05-20 | Stop reason: HOSPADM

## 2022-05-20 RX ORDER — FUROSEMIDE 20 MG/1
20 TABLET ORAL DAILY
Status: DISCONTINUED | OUTPATIENT
Start: 2022-05-20 | End: 2022-05-20 | Stop reason: HOSPADM

## 2022-05-20 RX ORDER — HYDRALAZINE HYDROCHLORIDE 25 MG/1
75 TABLET, FILM COATED ORAL EVERY 8 HOURS
Status: DISCONTINUED | OUTPATIENT
Start: 2022-05-20 | End: 2022-05-20 | Stop reason: HOSPADM

## 2022-05-20 RX ADMIN — CLONIDINE 1 PATCH: 0.1 PATCH TRANSDERMAL at 01:05

## 2022-05-20 RX ADMIN — METOPROLOL SUCCINATE 100 MG: 50 TABLET, FILM COATED, EXTENDED RELEASE ORAL at 08:05

## 2022-05-20 RX ADMIN — INSULIN DETEMIR 40 UNITS: 100 INJECTION, SOLUTION SUBCUTANEOUS at 08:05

## 2022-05-20 RX ADMIN — FUROSEMIDE 20 MG: 20 TABLET ORAL at 10:05

## 2022-05-20 RX ADMIN — HYDRALAZINE HYDROCHLORIDE 75 MG: 25 TABLET ORAL at 10:05

## 2022-05-20 RX ADMIN — ASPIRIN 81 MG: 81 TABLET, COATED ORAL at 08:05

## 2022-05-20 RX ADMIN — INSULIN ASPART 2 UNITS: 100 INJECTION, SOLUTION INTRAVENOUS; SUBCUTANEOUS at 11:05

## 2022-05-20 RX ADMIN — TIOTROPIUM BROMIDE INHALATION SPRAY 2 PUFF: 1.56 SPRAY, METERED RESPIRATORY (INHALATION) at 07:05

## 2022-05-20 RX ADMIN — ATORVASTATIN CALCIUM 40 MG: 40 TABLET, FILM COATED ORAL at 08:05

## 2022-05-20 NOTE — PT/OT/SLP PROGRESS
Occupational Therapy   Treatment    Name: Steff Johnson  MRN: [id number]  Admitting Diagnosis:  Acute right-sided weakness       Recommendations:     Discharge Recommendations: outpatient OT  Discharge Equipment Recommendations:  none  Barriers to discharge:  None    Assessment:     Steff Johnson is a 62 y.o. female with a medical diagnosis of Acute right-sided weakness.   Performance deficits affecting function are weakness, impaired endurance, impaired self care skills, impaired functional mobilty, gait instability, impaired balance, decreased coordination, decreased upper extremity function, decreased lower extremity function, decreased safety awareness, impaired coordination, impaired fine motor, impaired cardiopulmonary response to activity.     Rehab Prognosis:  Good; patient would benefit from acute skilled OT services to address these deficits and reach maximum level of function.       Plan:     Patient to be seen 3 x/week to address the above listed problems via self-care/home management, therapeutic activities, therapeutic exercises, neuromuscular re-education, wheelchair management/training  · Plan of Care Expires: 06/18/22  · Plan of Care Reviewed with: patient, sibling    Subjective     Pain/Comfort:  · Pain Rating 1: 0/10  · Pain Rating Post-Intervention 1: 0/10    Objective:     Communicated with: nurse prior to session.  Patient found supine with telemetry upon OT entry to room.    General Precautions: Standard, fall, aspiration (permissive BP at time of session up to 220 systolic and 110 diastolic)   Orthopedic Precautions:N/A   Respiratory Status: Room air   BP prior to session 195/91; after session BP was 177/85     Occupational Performance:     Bed Mobility:    · Patient completed Supine to Sit with stand by assistance     Functional Mobility/Transfers:  · Patient completed Sit <> Stand Transfer with contact guard assistance  with  rolling walker   · Functional Mobility: ~40ft in hospital room  CGA with RW    Treatment & Education:  Pt declined ADL participation today; education focused session on ways to increase safety/decrease fall risk with new visual impairment in right eye; pt and sister v/u     Patient left supine with all lines intact, call button in reach and sister presentEducation:  ; bed alarm declined    GOALS:   Multidisciplinary Problems     Occupational Therapy Goals        Problem: Occupational Therapy    Goal Priority Disciplines Outcome Interventions   Occupational Therapy Goal     OT, PT/OT     Description: Goals to be met by: d/c     Patient will increase functional independence with ADLs by performing:    UE Dressing with Stand-by Assistance.  LE Dressing with Stand-by Assistance.  Grooming while standing at sink with Stand-by Assistance.  Toileting from toilet with Stand-by Assistance for hygiene and clothing management.   Toilet transfer to toilet with Stand-by Assistance.                     Time Tracking:     OT Date of Treatment: 05/20/22  OT Start Time: 1135  OT Stop Time: 1200  OT Total Time (min): 25 min    Billable Minutes:Therapeutic Activity 25    OT/LIYAH: OT          5/20/2022

## 2022-05-20 NOTE — PLAN OF CARE
05/20/22 1151   POTTER Message   Medicare Outpatient and Observation Notification regarding financial responsibility Given to patient/caregiver;Explained to patient/caregiver;Signed/date by patient/caregiver   Date POTTER was signed 05/20/22   Time POTTER was signed 1142   Form reviewed with pt and sister Gita at bedside.

## 2022-05-20 NOTE — PLAN OF CARE
Problem: Adult Inpatient Plan of Care  Goal: Plan of Care Review  5/20/2022 1514 by Jessica Hewitt RN  Outcome: Met  5/20/2022 0936 by Jessica Hewitt RN  Outcome: Ongoing, Progressing  Goal: Patient-Specific Goal (Individualized)  5/20/2022 1514 by Jessica Hewitt RN  Outcome: Met  5/20/2022 0936 by Jessica Hewitt RN  Outcome: Ongoing, Progressing  Goal: Absence of Hospital-Acquired Illness or Injury  5/20/2022 1514 by Jessica Hewitt RN  Outcome: Met  5/20/2022 0936 by Jessica Hewitt RN  Outcome: Ongoing, Progressing  Goal: Optimal Comfort and Wellbeing  5/20/2022 1514 by Jessica Hewitt RN  Outcome: Met  5/20/2022 0936 by Jessica Hewitt RN  Outcome: Ongoing, Progressing  Goal: Readiness for Transition of Care  5/20/2022 1514 by Jessica Hewitt RN  Outcome: Met  5/20/2022 0936 by Jessica Hewitt RN  Outcome: Ongoing, Progressing     Problem: Adjustment to Illness (Stroke, Ischemic/Transient Ischemic Attack)  Goal: Optimal Coping  5/20/2022 1514 by Jessica Hewitt RN  Outcome: Met  5/20/2022 0936 by Jessica Hewitt RN  Outcome: Ongoing, Progressing     Problem: Bowel Elimination Impaired (Stroke, Ischemic/Transient Ischemic Attack)  Goal: Effective Bowel Elimination  5/20/2022 1514 by Jessica Hewitt RN  Outcome: Met  5/20/2022 0936 by Jessica Hewitt RN  Outcome: Ongoing, Progressing     Problem: Cerebral Tissue Perfusion (Stroke, Ischemic/Transient Ischemic Attack)  Goal: Optimal Cerebral Tissue Perfusion  5/20/2022 1514 by Jessica Hewitt RN  Outcome: Met  5/20/2022 0936 by Jessica Hewitt RN  Outcome: Ongoing, Progressing     Problem: Cognitive Impairment (Stroke, Ischemic/Transient Ischemic Attack)  Goal: Optimal Cognitive Function  5/20/2022 1514 by Jessica Hewitt RN  Outcome: Met  5/20/2022 0936 by Jessica Hewitt RN  Outcome: Ongoing, Progressing     Problem: Communication Impairment (Stroke, Ischemic/Transient Ischemic Attack)  Goal: Improved Communication Skills  5/20/2022 1514 by Jessica Hewitt RN  Outcome: Met  5/20/2022 0936 by Jessica Hewitt  RN  Outcome: Ongoing, Progressing     Problem: Functional Ability Impaired (Stroke, Ischemic/Transient Ischemic Attack)  Goal: Optimal Functional Ability  5/20/2022 1514 by Jessica Hewitt RN  Outcome: Met  5/20/2022 0936 by Jessica Hewitt RN  Outcome: Ongoing, Progressing     Problem: Respiratory Compromise (Stroke, Ischemic/Transient Ischemic Attack)  Goal: Effective Oxygenation and Ventilation  5/20/2022 1514 by Jessica Hewitt RN  Outcome: Met  5/20/2022 0936 by Jessica Hewitt RN  Outcome: Ongoing, Progressing     Problem: Sensorimotor Impairment (Stroke, Ischemic/Transient Ischemic Attack)  Goal: Improved Sensorimotor Function  5/20/2022 1514 by Jessica Hewitt RN  Outcome: Met  5/20/2022 0936 by Jessica Hewitt RN  Outcome: Ongoing, Progressing     Problem: Swallowing Impairment (Stroke, Ischemic/Transient Ischemic Attack)  Goal: Optimal Eating and Swallowing without Aspiration  5/20/2022 1514 by Jessica Hewitt RN  Outcome: Met  5/20/2022 0936 by Jessica Hewitt RN  Outcome: Ongoing, Progressing     Problem: Urinary Elimination Impaired (Stroke, Ischemic/Transient Ischemic Attack)  Goal: Effective Urinary Elimination  5/20/2022 1514 by Jessica Hewitt RN  Outcome: Met  5/20/2022 0936 by Jessica Hewitt RN  Outcome: Ongoing, Progressing     Problem: Diabetes Comorbidity  Goal: Blood Glucose Level Within Targeted Range  5/20/2022 1514 by Jessica Hewitt RN  Outcome: Met  5/20/2022 0936 by Jessica Hewitt RN  Outcome: Ongoing, Progressing     Problem: Infection  Goal: Absence of Infection Signs and Symptoms  Outcome: Met     Problem: Impaired Wound Healing  Goal: Optimal Wound Healing  Outcome: Met     Problem: Skin Injury Risk Increased  Goal: Skin Health and Integrity  5/20/2022 1514 by Jessica Hewitt RN  Outcome: Met  5/20/2022 0936 by Jessica Hewitt RN  Outcome: Ongoing, Progressing

## 2022-05-20 NOTE — PLAN OF CARE
[facility]  Initial Discharge Assessment       Primary Care Provider: Cristina Ren MD    Admission Diagnosis: Hypertensive emergency [I16.1]    Admission Date: 5/18/2022  Expected Discharge Date:     Assessment completed with pt and sister Gita at bedside. Pt lives with her fibrody Mock and brother Tee, and plans to return home at discharge. She had home health in the past, none at present. Therapy is recommending OP PT/OT at this time - pt in agreement. No needs voiced at this time.    Discharge Barriers Identified: (P) None    Payor: [employer] MANAGED MEDICARE / Plan: Monitor SNP (SPECIAL NEEDS PLAN) / Product Type: Medicare Advantage /     Extended Emergency Contact Information  Primary Emergency Contact: Gita Johnson  Address: [place] [address] [place]  Mobile Phone: [phone]  Relation: Sister  Preferred language: English   needed? No  Secondary Emergency Contact: Tee Johnson  Address: [place] [address] [place]  Mobile Phone: [phone]  Relation: Brother  Preferred language: English   needed? No    Discharge Plan A: (P) Home with family  Discharge Plan B: (P) Home with family      [facility] - [place], [place] - [address]  [address] [facility] [address]  Phone: [phone] Fax: [phone]    [facility] [address]  [facility] [address]  Phone: [phone] Fax: [phone]    [id number] - [place], [place] - [address] [facility]  [address] [facility] [address] [facility] [address]  Phone: [phone] Fax: [phone]      Initial Assessment (most recent)     Adult Discharge Assessment - 05/20/22 1135        Discharge Assessment    Assessment Type Discharge Planning Assessment     Confirmed/corrected address, phone number and insurance Yes     Confirmed Demographics Correct on Facesheet     Source  of Information patient;family     When was your last doctors appointment? -- (P)    within the last few months    Communicated ALEX with patient/caregiver Date not available/Unable to determine (P)      Reason For Admission hypertensive emergency (P)      Lives With sibling(s);significant other (P)    Vaishnavi Mock and brother Tee    Do you expect to return to your current living situation? Yes (P)      Do you have help at home or someone to help you manage your care at home? Yes (P)      Who are your caregiver(s) and their phone number(s)? Brother Tee Green [phone] or nirali Mock Figueroa (P)      Prior to hospitilization cognitive status: Alert/Oriented (P)      Current cognitive status: Alert/Oriented (P)      Walking or Climbing Stairs Difficulty ambulation difficulty, requires equipment (P)      Mobility Management walker (P)      Dressing/Bathing Difficulty none (P)      Equipment Currently Used at Home rollator;shower chair;bedside commode;wheelchair (P)      Patient currently being followed by outpatient case management? No (P)      Do you currently have service(s) that help you manage your care at home? No (P)    Had in the past    Do you take prescription medications? Yes (P)      Do you have prescription coverage? Yes (P)      Coverage [employer] (P)      Do you have any problems affording any of your prescribed medications? No (P)      Is the patient taking medications as prescribed? yes (P)      Who is going to help you get home at discharge? sister or fiance (P)      How do you get to doctors appointments? family or friend will provide (P)      Are you on dialysis? No (P)      Do you take coumadin? No (P)      Discharge Plan A Home with family (P)      Discharge Plan B Home with family (P)      DME Needed Upon Discharge  none (P)      Discharge Plan discussed with: Patient;Sibling (P)      Discharge Barriers Identified None (P)

## 2022-05-20 NOTE — PLAN OF CARE
05/20/22 0728   Patient Assessment/Suction   Level of Consciousness (AVPU) alert   Respiratory Effort Unlabored;Normal   All Lung Fields Breath Sounds clear;equal bilaterally   Rhythm/Pattern, Respiratory pattern regular;depth regular   PRE-TX-O2   O2 Device (Oxygen Therapy) room air   SpO2 99 %   Pulse Oximetry Type Continuous   $ Pulse Oximetry - Multiple Charge Pulse Oximetry - Multiple   Pulse 61   Resp 16   Inhaler   $ Inhaler Charges MDI (Metered Dose Inahler) Treatment   Daily Review of Necessity (Inhaler) completed   Respiratory Treatment Status (Inhaler) given   Treatment Route (Inhaler) mouthpiece   Patient Position (Inhaler) sitting on edge of bed   Post Treatment Assessment (Inhaler) breath sounds unchanged   Signs of Intolerance (Inhaler) none

## 2022-05-20 NOTE — PLAN OF CARE
Ambulatory referral for outpatient PT is in [facility]. Outpatient PT will see referral due to it being an internal referral. Discharge orders and chart reviewed with no further post-acute discharge needs identified at this time.  At this time, patient is cleared for discharge from Case Management standpoint.       05/20/22 1512   Final Note   Assessment Type Final Discharge Note   Anticipated Discharge Disposition Home   What phone number can be called within the next 1-3 days to see how you are doing after discharge? [phone]   Post-Acute Status   Discharge Delays None known at this time

## 2022-05-20 NOTE — PLAN OF CARE
Problem: Adult Inpatient Plan of Care  Goal: Plan of Care Review  Outcome: Ongoing, Progressing  Goal: Patient-Specific Goal (Individualized)  Outcome: Ongoing, Progressing  Goal: Absence of Hospital-Acquired Illness or Injury  Outcome: Ongoing, Progressing  Goal: Optimal Comfort and Wellbeing  Outcome: Ongoing, Progressing  Goal: Readiness for Transition of Care  Outcome: Ongoing, Progressing     Problem: Adjustment to Illness (Stroke, Ischemic/Transient Ischemic Attack)  Goal: Optimal Coping  Outcome: Ongoing, Progressing     Problem: Bowel Elimination Impaired (Stroke, Ischemic/Transient Ischemic Attack)  Goal: Effective Bowel Elimination  Outcome: Ongoing, Progressing     Problem: Cerebral Tissue Perfusion (Stroke, Ischemic/Transient Ischemic Attack)  Goal: Optimal Cerebral Tissue Perfusion  Outcome: Ongoing, Progressing     Problem: Cognitive Impairment (Stroke, Ischemic/Transient Ischemic Attack)  Goal: Optimal Cognitive Function  Outcome: Ongoing, Progressing     Problem: Communication Impairment (Stroke, Ischemic/Transient Ischemic Attack)  Goal: Improved Communication Skills  Outcome: Ongoing, Progressing     Problem: Functional Ability Impaired (Stroke, Ischemic/Transient Ischemic Attack)  Goal: Optimal Functional Ability  Outcome: Ongoing, Progressing     Problem: Respiratory Compromise (Stroke, Ischemic/Transient Ischemic Attack)  Goal: Effective Oxygenation and Ventilation  Outcome: Ongoing, Progressing     Problem: Sensorimotor Impairment (Stroke, Ischemic/Transient Ischemic Attack)  Goal: Improved Sensorimotor Function  Outcome: Ongoing, Progressing     Problem: Swallowing Impairment (Stroke, Ischemic/Transient Ischemic Attack)  Goal: Optimal Eating and Swallowing without Aspiration  Outcome: Ongoing, Progressing     Problem: Urinary Elimination Impaired (Stroke, Ischemic/Transient Ischemic Attack)  Goal: Effective Urinary Elimination  Outcome: Ongoing, Progressing     Problem: Diabetes  Comorbidity  Goal: Blood Glucose Level Within Targeted Range  Outcome: Ongoing, Progressing     Problem: Infection  Goal: Absence of Infection Signs and Symptoms  Outcome: Met     Problem: Impaired Wound Healing  Goal: Optimal Wound Healing  Outcome: Met     Problem: Skin Injury Risk Increased  Goal: Skin Health and Integrity  Outcome: Ongoing, Progressing

## 2022-05-20 NOTE — PT/OT/SLP PROGRESS
Physical Therapy      Patient Name:  Steff Johnson   MRN:  [id number]    Patient not seen today secondary to Other (Comment) (pt w/ OT in am and PT unavailable in pm.). Will follow-up 5/23/22.

## 2022-05-20 NOTE — PROGRESS NOTES
[facility]  Neurology Progress Note    Patient Name: Steff Johnson   MRN: [id number]  : 1960  TODAY'S DATE: 2022  ADMIT DATE: 2022  4:57 PM                                          CONSULTED PROVIDER: Jennifer Tony MD, Neurologist. Cellphone: [phone]  CONSULT REQUESTED BY: Alf Bourne MD     Chief Complaint   Patient presents with    Blurred Vision     Patient reports blurry vision in R eye and R sided weakness since yesterday around 0900         HPI per EMR:  62-year-old, nonsmoker,  female with history of diabetes mellitus (hemoglobin A1c 12.7 on 2022), hypertension, stroke in 2019, COPD, arthritis  Presented to the emergency department with right-sided weakness and blurry vision  Reports woke up at 8:00 a.m. yesterday morning with right-sided weakness and blurry vision in the right eye  Denies fever, chills, shortness of breath, chest pain, abdominal pain, nausea, vomiting, diarrhea, urinary symptoms  Reports taking her medication as prescribed. States that her catapress patch fell off a few days ago.     Noted to be hypertensive with systolic blood pressure 162-245. Work up in the ED was unremarkable.  CTA head was negative. CTA head and neck showed no clinically significant stenosis, large vessel occlusion or aneurysm.  EKG showed sinus rhythm with nonspecific T-wave abnormality.    Neurology Consult:  Patient was seen examined by me today.  She is a 62-year-old woman with history of uncontrolled diabetes, hypertension, stroke, and COPD who presented to the ED the with double vision, right-sided weakness and numbness.  She states that her symptoms began the morning prior to admission and she tried to rest, but symptoms persisted and she decided to present to the emergency room.  Her blood pressure was very elevated with systolics in the 200s.  CT brain showed no acute bleeding, CTA was negative for large vessel occlusion.  Patient was  transferred to [facility] for stroke workup and management.    5/20/22:  Patient was seen and examined by me today.  She reports that her right-sided weakness is improving as well as her speech.  Her vision is unchanged.  There were no acute events overnight.      Scheduled Meds:   aspirin  81 mg Oral Daily    atorvastatin  40 mg Oral Daily    enoxaparin  40 mg Subcutaneous Daily    insulin detemir U-100  40 Units Subcutaneous Daily    metoprolol succinate  100 mg Oral Daily    polyethylene glycol  17 g Oral Daily    tiotropium bromide  2 puff Inhalation Daily     Continuous Infusions:   niCARdipine       PRN Meds:.dextrose 10%, dextrose 10%, glucagon (human recombinant), glucose, glucose, insulin aspart U-100, sodium chloride 0.9%      Physical Exam  Current Vitals:  Vitals:    05/20/22 0728   BP:    Pulse: 61   Resp: 16   Temp:        Physical Exam:  General: AO x4  HEENT: PERRL, left 3rd cranial nerve palsy  CV: RRR  Lungs:  Decreased breath sounds  Abdomen: +BS    Neurological Exam    MENTAL STATUS EXAM:  Level of alertness: Alert  Level of attention: Attentive w/out deficit  Orientation/Awareness: intact to person, place, time, situation  Language: fluent but slurred. Comprehension/repetition/naming intact    CRANIAL NERVE EXAM:  II/III: fundoscopic exam deferred, PERRL; visual fields full to confrontation  III/IV/VI:  There is left-sided 3rd nerve palsy, horizontal nystagmus seen  V:  Right-sided numbness in face  VII: no facial asymmetry noted  VIII: no deficits in hearing bilaterally  IX/X: palate @ ML and raises symmetrically  XI: shoulder shrug 5/5 bilaterally; head turn 5/5 bilaterally  XII: tongue to midline w/out asymmetry    MOTOR EXAM:  Bulk and Tone: normal throughout  Strength is 5/5 proximally and distally in left upper and lower extremities without deficits.  Strength is 5 out of 5 in right upper and lower extremity.    REFLEXES:  [place] (CN V) Not Tested  1+ in bilateral upper and  lower extremities, no Evans's, no clonus. Downgoing plantars.      SENSORY EXAM  Mildly decreased sensation to light touch in right upper lower extremity.  Rest intact throughout.    COORDINATION/CEREBELLAR EXAM:  FTN: no dysmetria or other signs of appendicular ataxia  HTS: No evidence of appendicular ataxia    GAIT:  Deferred for safety.    NIH Stroke Scale      Date: 2022  Time: 1500  Person Administering Scale: Jennifer Tony MD    Administer stroke scale items in the order listed. Record performance in each category after each subscale exam. Do not go back and change scores. Follow directions provided for each exam technique. Scores should reflect what the patient does, not what the clinician thinks the patient can do. The clinician should record answers while administering the exam and work quickly. Except where indicated, the patient should not be coached (i.e., repeated requests to patient to make a special effort).      1a  Level of consciousness: 0=alert; keenly responsive   1b. LOC questions:  0=Answers both tasks correctly   1c. LOC commands: 0=Answers both tasks correctly   2.  Best Gaze: 1=partial gaze palsy   3.  Visual: 0=No visual loss   4. Facial Palsy: 1=Minor paralysis (flattened nasolabial fold, asymmetric on smiling)   5a.  Motor left arm: 0=No drift, limb holds 90 (or 45) degrees for full 10 seconds   5b.  Motor right arm: 0=No drift, limb holds 90 (or 45) degrees for full 10 seconds   6a. motor left le=No drift, limb holds 90 (or 45) degrees for full 10 seconds   6b  Motor right le=No drift, limb holds 90 (or 45) degrees for full 10 seconds   7. Limb Ataxia: 0=Absent   8.  Sensory: 1=Mild to moderate sensory loss; patient feels pinprick is less sharp or is dull on the affected side; there is a loss of superficial pain with pinprick but patient is aware She is being touched   9. Best Language:  0=No aphasia, normal   10. Dysarthria: 1=Mild to moderate, patient slurs at  least some words and at worst, can be understood with some difficulty   11. Extinction and Inattention: 0=No abnormality    Total:   4       Laboratory Data & Studies    Recent Labs   Lab 05/18/22 2036 05/19/22 0312 05/20/22 0451   WBC 11.01 11.35 9.50   HGB 13.2 13.1 13.6    444 453*   MCV 80* 80* 80*       Recent Labs   Lab 05/18/22 1716 05/19/22 0312 05/20/22 0451    139 136   K 4.2 3.8 4.0    105 102   CO2 22* 22* 24   BUN 23 22 25*   * 152* 127*   CALCIUM 8.9 9.0 9.1   MG  --  2.0  --    PHOS  --  3.0  --        Recent Labs   Lab 05/18/22 1716 05/19/22 0312 05/20/22 0451   PROT 7.6 7.3 7.7   ALBUMIN 3.8 3.9 3.9   BILITOT 0.6 0.9 0.8   AST 24 20 61*   ALT 30 25 57*   ALKPHOS 69 66 75       Recent Labs   Lab 05/18/22 1716   LABPT 13.4   INR 1.1       Recent Labs   Lab 05/18/22 1716 05/18/22 1721 05/18/22  2230   HGBA1C  --   --  7.2*   CHOL  --  192  --    TRIG  --  47  --    LDLCALC  --  87.6  --    HDL  --  95*  --    TSH 1.390  --   --        Imaging:  CT Head Without Contrast    Result Date: 5/18/2022  CMS MANDATED QUALITY DATA - CT RADIATION 436 All CT scans at this facility utilize dose modulation, iterative reconstruction, and/or weight based dosing when appropriate to reduce radiation dose to as low as reasonably achievable. CLINICAL HISTORY: 62 years (1960) Female Neuro deficit, acute, stroke suspected TECHNIQUE: CT HEAD WITHOUT IV CONTRAST. 106 images obtained. Axial CT of the brain without contrast using soft tissue and bone algorithm. Please note in the acute setting if there is a clinical concern for an acute stroke MRI would be more sensitive/specific for evaluation of ischemia. COMPARISON: Most recent CT from January 6, 2022. FINDINGS: No acute intracranial hemorrhage, hydrocephalus, herniation or midline shift and the basal and suprasellar cisterns are patent. No acute skull fracture is identified. Mild diffuse cerebral atrophy for age with periventricular  deep cerebral white matter low attenuation, a nonspecific finding in this age group which can be seen in any diffuse white matter process but which is most commonly associated with chronic microvascular ischemic disease. There are tiny rounded defects in the basal ganglia compatible with prior lacunar infarcts. There are scattered atheromatous calcifications in the intracranial internal carotid arteries. Orbital contents appear within normal limits. External auditory canals are unremarkable. The visualized paranasal sinuses and mastoid air cells are essentially clear. IMPRESSION: 1. No acute intracranial process. 2. Chronic/involutional findings as noted above. . Electronically signed by:  Serg Brambila MD  5/18/2022 5:57 PM CDT Workstation: [id number]    X-Ray Chest AP Portable    Result Date: 5/18/2022  CLINICAL HISTORY: 62 years (1960) Female CHF chf TECHNIQUE: Portable AP radiograph the chest. COMPARISON: Radiograph from July 2, 2021 FINDINGS: The lungs are clear. Costophrenic angles are seen without effusion. No pneumothorax is identified. The heart is normal in size. The mediastinum is within normal limits. Osseous structures show degenerative disc disease and degenerative changes in the shoulders. The visualized upper abdomen is unremarkable. IMPRESSION: No acute cardiac or pulmonary process. . Electronically signed by:  Serg Brambila MD  5/18/2022 5:36 PM CDT Workstation: [id number]    CTA Head and Neck (xpd)    Result Date: 5/18/2022  CMS MANDATED QUALITY DATA - CT RADIATION 436 All CT scans at this facility utilize dose modulation, iterative reconstruction, and/or weight based dosing when appropriate to reduce radiation dose to as low as reasonably achievable. CMS MANDATED QUALITY DATA - CAROTID - 195 All measurements and percent stenosis described below were determined using NASCET criteria or criteria similar to NASCET, as defined by the Society of Radiologists in Ultrasound Consensus  Conference, Radiology, 2003 CLINICAL HISTORY: 62 years (1960) Female Stroke, hemorrhagic TECHNIQUE: CT HEAD NECK ANGIOGRAPHY WITH IV CONTRAST. 428 images obtained. CT angiography of the head and neck was performed using thin axial slices respectively and reviewed in multiple planes. Two and three dimensional multiplanar reformatted images were generated and submitted to PACS. POST PROCESSING: (Vitrea) 3D advanced post-processing was performed by the interpreting physician using an independent workstation utilizing a combination of MIP and MPR techniques to better evaluate anatomy and disease process.  3D rendering was performed with physician participation and supervision. CONTRAST: 100  mL Omni 350 was injected intravenously. COMPARISON: None available. FINDINGS: CTA of the [place]: There is patency of the intracranial circulation including the bilateral internal carotid arteries, the carotid terminus, the A1 and M1 segments, the bilateral intracranial vertebral arteries, the basilar artery and its distal branches. The posterior communicating arteries are patent. No aneurysm is identified within the limits of the technique. Conventional angiography is more sensitive for the detection of small aneurysms. CTA of the Neck: There is a three-vessel aortic arch. CTA of the neck demonstrates that the origins of the great vessels are widely patent. No aneurysm is seen. The carotid arteries are tortuous and the distal common carotid, carotid bulb and proximal internal carotid arteries course behind the oropharynx. RIGHT: Common carotid artery origin: Patent. Cervical segment: Patent. External carotid origin characteristics: Patent. Carotid bifurcation: Patent. Internal carotid origin characteristics: No focal stenosis. Vertebral artery: within normal limits. LEFT Common carotid artery origin: Patent. Cervical segment: Patent. External carotid origin characteristics: Patent. Carotid bifurcation: Patent. Internal  carotid origin characteristics: No focal stenosis. Vertebral artery: within normal limits. IMPRESSION: No clinically significant stenosis, large vessel occlusion or aneurysm is identified as outlined above. . Electronically signed by:  Serg Brambila MD  5/18/2022 6:00 PM CDT Workstation: [id number]      Assessment:  62-year-old woman with history of uncontrolled diabetes, hypertension, stroke, and COPD who presented to the ED the with double vision, right-sided weakness and numbness concerning for brainstem stroke.  She was outside of the window for tPA.  Blood pressure was very elevated with systolics in the 200s.  NIH stroke scale was 5. Admitted for stroke workup and management.    Stroke workup:  CTH:  No acute intracranial abnormality  CTA head and neck:  No large vessel occlusion  MRI brain:  No acute stroke seen.  Moderate to severe microvascular disease is seen.  Scattered chronic microhemorrhages unchanged from prior MRI suggestive of hypertensive etiology  Risk factors: A1C, TSH, LDL  Echocardiogram:  Normal left ventricular systolic function, EF 65%, no PFO    Plan:  - Admitted to hospital medicine with q4 hour neuro checks, on telemetry, continuous pulse oximetry  - Diet after eval per SLP.  - Secondary stroke prevention with ASA 81 mg daily, plavix 75 mg daily, atorvastatin 80 mg daily. Continue DAPT with both ASA and plavix for 3 weeks, then stop plavix and continue ASA monotherapy and statin  - Risk factor stratification with HgbA1C, Fasting Lipid profile, TSH  - 2D echocardiogram as above  - Maintain Euthermia with Tylenol prn temp > 37.2 degrees C.  - Assessment for rehab with PT/OT/SLP evaluation and treatment.  - Permissive HTN systolic BP goal up to 220, diastolic up to 110 for 24H, then lower BP slowly to normotension  - Cardiac enzymes and EKG   No further workup as inpatient is needed this time.  Please call with questions    Patient was counseled in stroke signs, symptoms and risk factors.  He/She was instructed to call [phone] immediately if experiencing acute neurological deficits.  Patient counseled in healthy diet and physical activity. Importance of medication adherence and follow-up were emphasized.    · DVT prophylaxis with chemo/SCD prophylaxis  · Extensively discussed lifestyle modifications as prophylactic measures for stroke prevention including smoking cessation, adequate blood pressure management, healthy diet and regular exercise.    [phone] within 3 days from discharge.     Stroke education was provided including stroke risk factors modification and any acute neurological changes including weakness, confusion, visual changes to come straight to the ER.     All questions were answered.                              Thank you kindly for including us in the care of this patient. Please do not hesitate to contact us with any questions.    65 minutes of  care time has been spent evaluating with the patient. Time includes chart review not limited to diagnostic imaging, labs, and vitals, patient assessment, discussion with family and nursing, current order evaluations, and new order entries.      Jennifer Tony MD  Neurology  Cellphone: [phone]

## 2022-05-20 NOTE — CARE UPDATE
05/19/22 2148   Patient Assessment/Suction   Respiratory Effort Unlabored   Rhythm/Pattern, Respiratory pattern regular   PRE-TX-O2   O2 Device (Oxygen Therapy) room air   SpO2 95 %   Pulse Oximetry Type Continuous   Pulse 69   Resp 16   Respiratory Evaluation   $ Care Plan Tech Time 15 min   $ Eval/Re-eval Charges Re-evaluation   Evaluation For   (CARE PLAN)

## 2022-05-21 NOTE — DISCHARGE SUMMARY
[facility] Medicine  Discharge Summary      Patient Name: Steff Johnson  MRN: [id number]  Patient Class: OP- Observation  Admission Date: 5/18/2022  Hospital Length of Stay: 0 days  Discharge Date and Time:  05/20/2022 7:50 PM  Attending Physician: No att. providers found   Discharging Provider: Alf Bourne MD  Primary Care Provider: Cristina Ren MD      HPI:   62-year-old, nonsmoker,  female with history of diabetes mellitus (hemoglobin A1c 12.7 on 01/06/2022), hypertension, stroke in 2019, COPD, arthritis  Presented to the emergency department with right-sided weakness and blurry vision  Reports woke up at 8:00 a.m. yesterday morning with right-sided weakness and blurry vision in the right eye  Denies fever, chills, shortness of breath, chest pain, abdominal pain, nausea, vomiting, diarrhea, urinary symptoms  Reports taking her medication as prescribed. States that her catapress patch fell off a few days ago.    Noted to be hypertensive with systolic blood pressure 162-245. Work up in the ED was unremarkable.  CTA head was negative. CTA head and neck showed no clinically significant stenosis, large vessel occlusion or aneurysm.  EKG showed sinus rhythm with nonspecific T-wave abnormality.        * No surgery found *      Hospital Course:      HPI: 62-year-old, nonsmoker,  female with history of diabetes mellitus (hemoglobin A1c 12.7 on 01/06/2022), hypertension, stroke in 2019, COPD, arthritis  Presented to the emergency department with right-sided weakness and blurry vision  Reports woke up at 8:00 a.m. yesterday morning with right-sided weakness and blurry vision in the right eye  Denies fever, chills, shortness of breath, chest pain, abdominal pain, nausea, vomiting, diarrhea, urinary symptoms  Reports taking her medication as prescribed. States that her catapress patch fell off a few days ago.     Noted to be hypertensive with systolic blood pressure  162-245. Work up in the ED was unremarkable.  CTA head was negative. CTA head and neck showed no clinically significant stenosis, large vessel occlusion or aneurysm.  EKG showed sinus rhythm with nonspecific T-wave abnormality.       Hospital course  Patient was admitted with severely elevated hypertensive emergency with possible TIA symptoms.  Her blood pressure was only minimally treated at admission because of suspicion for stroke.  Neurology reviewed.  Stroke workup was negative except her previous stroke finding in MRI.  Later her pressure was aggressively treated and blood pressure was acceptable and discharged in stable condition.  Echo was repeated and normal and no intracardiac shunt.  Symptoms appear to be from hypertensive emergency and later medication were adjusted.  She was on chlorthalidone and replaced with Lasix.  Restarted her clonidine patch and appointment given with Dr. Magana neurologist for follow-up.  She was advised to see primary care doctor as soon as possible for blood pressure check.  Her aspirin was replaced with Plavix.       Goals of Care Treatment Preferences:  Code Status: Full Code      Consults:   Consults (From admission, onward)        Status Ordering Provider     IP consult to case management/social work  Once        Provider:  (Not yet assigned)    RUTH Qureshi     Inpatient consult to Neurology  Once        Provider:  Jennifer Meyer MD    Completed SUSANNA KIRK          No new Assessment & Plan notes have been filed under this hospital service since the last note was generated.  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    Specialty: Family Medicine  Contact information:  [address] [facility] [address]  [phone]             Jennifer Tony MD Follow up in 2 week(s).    Specialty: Neurology  Why: possible OP EEG  Contact information:  [address] [facility]  [facility] [id number] [phone]                       Patient Instructions:      Ambulatory referral/consult to Physical/Occupational Therapy   Standing Status: Future   Referral Priority: Routine Referral Type: Physical Medicine   Referral Reason: Specialty Services Required   Number of Visits Requested: 1     Diet Cardiac     Activity as tolerated       Significant Diagnostic Studies: Labs:   BMP:   Recent Labs   Lab 05/19/22 0312 05/20/22  0451   * 127*    136   K 3.8 4.0    102   CO2 22* 24   BUN 22 25*   CREATININE 0.9 1.0   CALCIUM 9.0 9.1   MG 2.0  --     and CMP   Recent Labs   Lab 05/19/22 0312 05/20/22  0451    136   K 3.8 4.0    102   CO2 22* 24   * 127*   BUN 22 25*   CREATININE 0.9 1.0   CALCIUM 9.0 9.1   PROT 7.3 7.7   ALBUMIN 3.9 3.9   BILITOT 0.9 0.8   ALKPHOS 66 75   AST 20 61*   ALT 25 57*   ANIONGAP 12 10   ESTGFRAFRICA >60.0 >60.0   EGFRNONAA >60.0 59.7*       Pending Diagnostic Studies:     None         Medications:  Reconciled Home Medications:      Medication List      START taking these medications    clopidogreL 75 mg tablet  Commonly known as: PLAVIX  Take 1 tablet (75 mg total) by mouth once daily.     furosemide 40 MG tablet  Commonly known as: LASIX  Take 0.5 tablets (20 mg total) by mouth once daily.        CHANGE how you take these medications    blood glucose control, low Soln  Use as directed  What changed:   · how much to take  · how to take this     blood sugar diagnostic Strp  Commonly known as: TRUE METRIX  "GLUCOSE TEST STRIP  Inject 100 strips into the skin 2 (two) times a day.  What changed: how much to take     * lancets Misc  To check BG 2 times daily, to use with insurance preferred meter  What changed:   · how much to take  · how to take this  · when to take this     * lancets 33 gauge Misc  Commonly known as: TRUEPLUS LANCETS  Inject 100 lancets into the skin 2 (two) times daily.  What changed: how much to take     pen needle, diabetic 32 gauge x 5/32" Ndle  Commonly known as: BD MI 2ND GEN PEN NEEDLE  Uses 4 daily  What changed:   · how much to take  · how to take this  · when to take this  · Another medication with the same name was removed. Continue taking this medication, and follow the directions you see here.         * This list has 2 medication(s) that are the same as other medications prescribed for you. Read the directions carefully, and ask your doctor or other care provider to review them with you.            CONTINUE taking these medications    atorvastatin 40 MG tablet  Commonly known as: LIPITOR  Take 1 tablet (40 mg total) by mouth once daily.     cloNIDine 0.1 mg/24 hr td ptwk 0.1 mg/24 hr  Commonly known as: CATAPRES  Place 1 patch onto the skin every 7 days.     ergocalciferol 50,000 unit Cap  Commonly known as: ERGOCALCIFEROL  Take 50,000 Units by mouth every 7 days.     hydrALAZINE 100 MG tablet  Commonly known as: APRESOLINE  Take 1 tablet (100 mg total) by mouth 3 (three) times daily.     INCRUSE ELLIPTA 62.5 mcg/actuation inhalation capsule  Generic drug: umeclidinium  Inhale 62.5 mcg into the lungs once daily. Controller     insulin aspart U-100 100 unit/mL (3 mL) Inpn pen  Commonly known as: NovoLOG Flexpen U-100 Insulin  Inject 12 Units into the skin 3 (three) times daily before meals. 12 units before meals + correction scale. Max daily dose of 60 units.     metoprolol succinate 100 MG 24 hr tablet  Commonly known as: TOPROL-XL  Take 1 tablet (100 mg total) by mouth once daily.   "   TRESIBA FLEXTOUCH U-100 100 unit/mL (3 mL) insulin pen  Generic drug: insulin degludec  Inject 40 Units into the skin once daily.        STOP taking these medications    alcohol swabs Padm  Commonly known as: BD ALCOHOL SWABS     aspirin 81 MG Chew     blood-glucose meter kit  Commonly known as: TRUE METRIX GLUCOSE METER     chlorthalidone 50 MG Tab  Commonly known as: HYGROTEN     fluticasone-umeclidin-vilanter 100-62.5-25 mcg Dsdv  Commonly known as: TRELEGY ELLIPTA     lancing device Misc  Commonly known as: TRUEDRAW LANCING DEVICE            Indwelling Lines/Drains at time of discharge:   Lines/Drains/Airways     None               General: Patient resting comfortably in no acute distress. Appears as stated age. Calm  Eyes: EOM intact. No conjunctivae injection. No scleral icterus.  ENT: Hearing grossly intact. No discharge from ears. No nasal discharge.   CVS: RRR. No LE edema BL.  Lungs: CTA BL, no wheezing or crackles. Good breath sounds. No accessory muscle use. No acute respiratory distress  Neuro: non focal , Follows commands. Responds appropriately  Time spent on the discharge of patient: 34 minutes         Alf Bourne MD  Department of Hospital Medicine  [facility]

## 2022-05-23 ENCOUNTER — PATIENT OUTREACH (OUTPATIENT)
Dept: ADMINISTRATIVE | Facility: HOSPITAL | Age: 62
End: 2022-05-23
Payer: MEDICARE

## 2022-05-23 NOTE — PROGRESS NOTES
Called patient regarding  BP gap report review. No answer. Unable to leave message as recording said call could not be completed as dialed as there were restrictions on that line.  Patient already have appointment scheduled 6/7/22 with Family Practice and a note which says BP Assessment.

## 2022-05-27 NOTE — PT/OT/SLP DISCHARGE
Occupational Therapy Discharge Summary    Steff Johnson  MRN: [id number]   Principal Problem: Acute right-sided weakness      Patient Discharged from acute Occupational Therapy on 5/20/2022.  Please refer to prior OT note dated 5/20/2022 for functional status.    Assessment:      Goals partially met.    Objective:     GOALS:   Multidisciplinary Problems     Occupational Therapy Goals     Not on file          Multidisciplinary Problems (Resolved)        Problem: Occupational Therapy    Goal Priority Disciplines Outcome Interventions   Occupational Therapy Goal   (Resolved)     OT, PT/OT Met    Description: Goals to be met by: d/c     Patient will increase functional independence with ADLs by performing:    UE Dressing with Stand-by Assistance.  LE Dressing with Stand-by Assistance.  Grooming while standing at sink with Stand-by Assistance.  Toileting from toilet with Stand-by Assistance for hygiene and clothing management.   Toilet transfer to toilet with Stand-by Assistance.                     Reasons for Discontinuation of Therapy Services  Transfer to alternate level of care.      Plan:     Patient Discharged to: Outpatient Therapy Services    5/27/2022

## 2022-05-31 ENCOUNTER — PATIENT MESSAGE (OUTPATIENT)
Dept: ADMINISTRATIVE | Facility: HOSPITAL | Age: 62
End: 2022-05-31
Payer: MEDICARE

## 2022-06-16 ENCOUNTER — PES CALL (OUTPATIENT)
Dept: ADMINISTRATIVE | Facility: CLINIC | Age: 62
End: 2022-06-16
Payer: MEDICARE

## 2022-07-12 ENCOUNTER — PATIENT MESSAGE (OUTPATIENT)
Dept: FAMILY MEDICINE | Facility: CLINIC | Age: 62
End: 2022-07-12
Payer: MEDICARE

## 2022-08-01 DIAGNOSIS — M25.511 ACUTE PAIN OF RIGHT SHOULDER: Primary | ICD-10-CM

## 2022-08-02 ENCOUNTER — OFFICE VISIT (OUTPATIENT)
Dept: FAMILY MEDICINE | Facility: CLINIC | Age: 62
End: 2022-08-02
Payer: MEDICARE

## 2022-08-02 ENCOUNTER — TELEPHONE (OUTPATIENT)
Dept: FAMILY MEDICINE | Facility: CLINIC | Age: 62
End: 2022-08-02
Payer: MEDICARE

## 2022-08-02 VITALS
DIASTOLIC BLOOD PRESSURE: 100 MMHG | HEART RATE: 76 BPM | SYSTOLIC BLOOD PRESSURE: 192 MMHG | BODY MASS INDEX: 26.45 KG/M2 | HEIGHT: 59 IN | TEMPERATURE: 99 F | WEIGHT: 131.19 LBS | OXYGEN SATURATION: 95 % | RESPIRATION RATE: 14 BRPM

## 2022-08-02 DIAGNOSIS — I16.0 HYPERTENSIVE URGENCY: Primary | ICD-10-CM

## 2022-08-02 DIAGNOSIS — Z79.4 TYPE 2 DIABETES MELLITUS WITH DIABETIC NEPHROPATHY, WITH LONG-TERM CURRENT USE OF INSULIN: ICD-10-CM

## 2022-08-02 DIAGNOSIS — I63.9 CEREBROVASCULAR ACCIDENT (CVA) OF LEFT PONTINE STRUCTURE: ICD-10-CM

## 2022-08-02 DIAGNOSIS — E11.21 TYPE 2 DIABETES MELLITUS WITH DIABETIC NEPHROPATHY, WITH LONG-TERM CURRENT USE OF INSULIN: ICD-10-CM

## 2022-08-02 PROCEDURE — 99999 PR PBB SHADOW E&M-EST. PATIENT-LVL V: ICD-10-PCS | Mod: PBBFAC,,,

## 2022-08-02 PROCEDURE — 99999 PR PBB SHADOW E&M-EST. PATIENT-LVL V: CPT | Mod: PBBFAC,,,

## 2022-08-02 PROCEDURE — 1160F RVW MEDS BY RX/DR IN RCRD: CPT | Mod: CPTII,S$GLB,,

## 2022-08-02 PROCEDURE — 99215 PR OFFICE/OUTPT VISIT, EST, LEVL V, 40-54 MIN: ICD-10-PCS | Mod: S$GLB,,,

## 2022-08-02 PROCEDURE — 3008F PR BODY MASS INDEX (BMI) DOCUMENTED: ICD-10-PCS | Mod: CPTII,S$GLB,,

## 2022-08-02 PROCEDURE — 3008F BODY MASS INDEX DOCD: CPT | Mod: CPTII,S$GLB,,

## 2022-08-02 PROCEDURE — 3051F PR MOST RECENT HEMOGLOBIN A1C LEVEL 7.0 - < 8.0%: ICD-10-PCS | Mod: CPTII,S$GLB,,

## 2022-08-02 PROCEDURE — 4010F PR ACE/ARB THEARPY RXD/TAKEN: ICD-10-PCS | Mod: CPTII,S$GLB,,

## 2022-08-02 PROCEDURE — 1159F PR MEDICATION LIST DOCUMENTED IN MEDICAL RECORD: ICD-10-PCS | Mod: CPTII,S$GLB,,

## 2022-08-02 PROCEDURE — 3077F SYST BP >= 140 MM HG: CPT | Mod: CPTII,S$GLB,,

## 2022-08-02 PROCEDURE — 1159F MED LIST DOCD IN RCRD: CPT | Mod: CPTII,S$GLB,,

## 2022-08-02 PROCEDURE — 3080F PR MOST RECENT DIASTOLIC BLOOD PRESSURE >= 90 MM HG: ICD-10-PCS | Mod: CPTII,S$GLB,,

## 2022-08-02 PROCEDURE — 3051F HG A1C>EQUAL 7.0%<8.0%: CPT | Mod: CPTII,S$GLB,,

## 2022-08-02 PROCEDURE — 99215 OFFICE O/P EST HI 40 MIN: CPT | Mod: S$GLB,,,

## 2022-08-02 PROCEDURE — 3077F PR MOST RECENT SYSTOLIC BLOOD PRESSURE >= 140 MM HG: ICD-10-PCS | Mod: CPTII,S$GLB,,

## 2022-08-02 PROCEDURE — 1160F PR REVIEW ALL MEDS BY PRESCRIBER/CLIN PHARMACIST DOCUMENTED: ICD-10-PCS | Mod: CPTII,S$GLB,,

## 2022-08-02 PROCEDURE — 4010F ACE/ARB THERAPY RXD/TAKEN: CPT | Mod: CPTII,S$GLB,,

## 2022-08-02 PROCEDURE — 3080F DIAST BP >= 90 MM HG: CPT | Mod: CPTII,S$GLB,,

## 2022-08-02 RX ORDER — INSULIN ASPART 100 [IU]/ML
12 INJECTION, SOLUTION INTRAVENOUS; SUBCUTANEOUS
Qty: 45 ML | Refills: 3 | Status: SHIPPED | OUTPATIENT
Start: 2022-08-02 | End: 2022-11-09 | Stop reason: SDUPTHER

## 2022-08-02 RX ORDER — HYDROCODONE BITARTRATE AND ACETAMINOPHEN 10; 325 MG/1; MG/1
TABLET ORAL
Status: ON HOLD | COMMUNITY
Start: 2022-06-08 | End: 2023-04-28 | Stop reason: HOSPADM

## 2022-08-02 RX ORDER — LISINOPRIL 10 MG/1
10 TABLET ORAL DAILY
COMMUNITY
Start: 2022-02-25 | End: 2022-08-02

## 2022-08-02 RX ORDER — VALSARTAN AND HYDROCHLOROTHIAZIDE 160; 25 MG/1; MG/1
1 TABLET ORAL DAILY
Qty: 90 TABLET | Refills: 3 | Status: SHIPPED | OUTPATIENT
Start: 2022-08-02 | End: 2022-11-09 | Stop reason: SDUPTHER

## 2022-08-02 RX ORDER — INSULIN DEGLUDEC 100 U/ML
INJECTION, SOLUTION SUBCUTANEOUS
Qty: 4 PEN | Refills: 0 | Status: SHIPPED | OUTPATIENT
Start: 2022-08-02 | End: 2022-09-12

## 2022-08-02 NOTE — TELEPHONE ENCOUNTER
----- Message from Arianna Alanis sent at 8/2/2022  8:45 AM CDT -----  Type:  Sooner Appointment Request    Caller is requesting a sooner appointment.  Caller declined first available appointment listed below.  Caller will not accept being placed on the waitlist and is requesting a message be sent to doctor.    Name of Caller:  pt  When is the first available appointment?  9/8  Symptoms:  memory loss  Best Call Back Number:  [phone]    Additional Information:  please advise--thank you

## 2022-08-02 NOTE — PROGRESS NOTES
Subjective:       Patient ID: Steff Johnson is a 62 y.o. female.    Chief Complaint: Hypertension    Steff Johnson is a 63 yo F pt w/ Mhx listed below that presents to the clinic for follow up of chronic medical conditions. Presents with concerns for elevated blood pressures. Has readings over 190 systolic at presentation today. Family member admits it is this high regularly. Patient and family member are unsure of what medication she is taking for blood pressure. A lot of confusion in regards to what medication the patient is taking and for what. Previously had home health but patient's family members dog bit the home health nurse. No longer has this dog as it was put to sleep. Patient's family member expressed that it is safe for home health nurse to enter home.     Patient has memory issues since having her stroke. Family member states that she does not have a neurologist that she follows with routinely.     Requests refills of diabetes medication at this time.         Past Medical History:   Diagnosis Date    Arthritis     Complete tear of left rotator cuff 11/09/2017    COPD (chronic obstructive pulmonary disease)     COVID-19 infection 07/02/2021    Diabetes mellitus     H/o Cerebrovascular accident (CVA) of left pontine structure 12/12/2019    Hypertension     Personal history of colonic polyps     Stroke     Wears glasses        Review of patient's allergies indicates:  No Known Allergies      Current Outpatient Medications:     blood glucose control, low Soln, Use as directed (Patient taking differently: 1 each by Misc.(Non-Drug; Combo Route) route. Use as directed), Disp: 1 each, Rfl: 1    blood sugar diagnostic (TRUE METRIX GLUCOSE TEST STRIP) Strp, Inject 100 strips into the skin 2 (two) times a day. (Patient taking differently: Inject 1 strip into the skin 2 (two) times a day.), Disp: 100 strip, Rfl: 2    cloNIDine 0.1 mg/24 hr td ptwk (CATAPRES) 0.1 mg/24 hr, Place 1 patch onto the skin  "every 7 days., Disp: 12 patch, Rfl: 3    clopidogreL (PLAVIX) 75 mg tablet, Take 1 tablet (75 mg total) by mouth once daily., Disp: 120 tablet, Rfl: 0    hydrALAZINE (APRESOLINE) 100 MG tablet, Take 1 tablet (100 mg total) by mouth 3 (three) times daily., Disp: 270 tablet, Rfl: 3    HYDROcodone-acetaminophen (NORCO)  mg per tablet, , Disp: , Rfl:     lancets Misc, To check BG 2 times daily, to use with insurance preferred meter (Patient taking differently: 1 lancet by Misc.(Non-Drug; Combo Route) route 2 (two) times a day. To check BG 2 times daily, to use with insurance preferred meter), Disp: 200 each, Rfl: 3    pen needle, diabetic (BD MI 2ND GEN PEN NEEDLE) 32 gauge x 5/32" Ndle, Uses 4 daily (Patient taking differently: 1 pen by Misc.(Non-Drug; Combo Route) route 4 (four) times daily. Uses 4 daily), Disp: 400 each, Rfl: 4    umeclidinium (INCRUSE ELLIPTA) 62.5 mcg/actuation inhalation capsule, Inhale 62.5 mcg into the lungs once daily. Controller, Disp: , Rfl:     atorvastatin (LIPITOR) 40 MG tablet, Take 1 tablet (40 mg total) by mouth once daily. (Patient not taking: Reported on 8/2/2022), Disp: 90 tablet, Rfl: 3    ergocalciferol (ERGOCALCIFEROL) 50,000 unit Cap, Take 50,000 Units by mouth every 7 days., Disp: , Rfl:     insulin aspart U-100 (NOVOLOG FLEXPEN U-100 INSULIN) 100 unit/mL (3 mL) InPn pen, Inject 12 Units into the skin 3 (three) times daily before meals. 12 units before meals + correction scale. Max daily dose of 60 units., Disp: 45 mL, Rfl: 3    insulin degludec (TRESIBA FLEXTOUCH U-100) 100 unit/mL (3 mL) insulin pen, ADMINISTER 40 UNITS UNDER THE SKIN EVERY EVENING, Disp: 4 pen, Rfl: 0    metoprolol succinate (TOPROL-XL) 100 MG 24 hr tablet, Take 1 tablet (100 mg total) by mouth once daily., Disp: 90 tablet, Rfl: 3    valsartan-hydrochlorothiazide (DIOVAN-HCT) 160-25 mg per tablet, Take 1 tablet by mouth once daily., Disp: 90 tablet, Rfl: 3    Review of Systems " "  Constitutional: Negative for activity change, appetite change, chills, diaphoresis, fatigue, fever and unexpected weight change.   HENT: Negative for congestion, ear pain, postnasal drip, rhinorrhea, sinus pressure, sneezing, sore throat and trouble swallowing.    Eyes: Positive for visual disturbance. Negative for pain and itching.   Respiratory: Negative for cough, chest tightness, shortness of breath and wheezing.    Cardiovascular: Negative for chest pain, palpitations and leg swelling.   Gastrointestinal: Negative for abdominal distention, abdominal pain, blood in stool, constipation, diarrhea, nausea and vomiting.   Endocrine: Negative for cold intolerance and heat intolerance.   Genitourinary: Negative for difficulty urinating, dysuria, frequency, hematuria and urgency.   Musculoskeletal: Negative for arthralgias, back pain, myalgias and neck pain.   Skin: Negative for color change, pallor, rash and wound.   Neurological: Positive for weakness. Negative for dizziness, syncope, numbness and headaches.   Hematological: Negative for adenopathy.   Psychiatric/Behavioral: Positive for confusion. Negative for behavioral problems. The patient is not nervous/anxious.        Objective:      BP (!) 192/100 (BP Location: Left arm, Patient Position: Sitting, BP Method: Medium (Manual))   Pulse 76   Temp 98.6 °F (37 °C) (Oral)   Resp 14   Ht 4' 11" (1.499 m)   Wt 59.5 kg (131 lb 2.8 oz)   SpO2 95%   BMI 26.49 kg/m²   Physical Exam  Vitals reviewed.   Constitutional:       General: She is not in acute distress.     Appearance: Normal appearance. She is normal weight. She is not ill-appearing, toxic-appearing or diaphoretic.   HENT:      Head: Normocephalic.      Right Ear: External ear normal.      Left Ear: External ear normal.      Nose: Nose normal. No congestion or rhinorrhea.      Mouth/Throat:      Mouth: Mucous membranes are moist.      Pharynx: Oropharynx is clear.   Eyes:      General: No scleral icterus. "        Right eye: No discharge.         Left eye: No discharge.      Extraocular Movements: Extraocular movements intact.      Conjunctiva/sclera: Conjunctivae normal.   Cardiovascular:      Rate and Rhythm: Normal rate and regular rhythm.      Pulses: Normal pulses.      Heart sounds: Normal heart sounds. No murmur heard.    No friction rub. No gallop.   Pulmonary:      Effort: Pulmonary effort is normal. No respiratory distress.      Breath sounds: Normal breath sounds. No wheezing, rhonchi or rales.   Chest:      Chest wall: No tenderness.   Abdominal:      General: There is no distension.      Palpations: Abdomen is soft. There is no mass.      Tenderness: There is no abdominal tenderness. There is no guarding.   Musculoskeletal:         General: No swelling, tenderness or deformity. Normal range of motion.      Cervical back: Normal range of motion.      Right lower leg: No edema.      Left lower leg: No edema.   Skin:     General: Skin is warm and dry.      Capillary Refill: Capillary refill takes less than 2 seconds.      Coloration: Skin is not jaundiced.      Findings: No bruising, erythema, lesion or rash.   Neurological:      Mental Status: She is alert and oriented to person, place, and time.      Motor: Weakness (R side) present.      Gait: Gait abnormal.         Assessment:       1. Hypertensive urgency    2. Type 2 diabetes mellitus with diabetic nephropathy, with long-term current use of insulin    3. H/o Cerebrovascular accident (CVA) of left pontine structure        Plan:       Hypertensive urgency  -     valsartan-hydrochlorothiazide (DIOVAN-HCT) 160-25 mg per tablet; Take 1 tablet by mouth once daily.  Dispense: 90 tablet; Refill: 3  -     SUBSEQUENT HOME HEALTH ORDERS  -     Ambulatory referral/consult to Cardiology; Future; Expected date: 08/09/2022  -     Ambulatory referral/consult to Optometry; Future; Expected date: 08/09/2022          Type 2 diabetes mellitus with diabetic nephropathy,  with long-term current use of insulin  -     insulin aspart U-100 (NOVOLOG FLEXPEN U-100 INSULIN) 100 unit/mL (3 mL) InPn pen; Inject 12 Units into the skin 3 (three) times daily before meals. 12 units before meals + correction scale. Max daily dose of 60 units.  Dispense: 45 mL; Refill: 3  -     insulin degludec (TRESIBA FLEXTOUCH U-100) 100 unit/mL (3 mL) insulin pen; ADMINISTER 40 UNITS UNDER THE SKIN EVERY EVENING  Dispense: 4 pen; Refill: 0  -     Ambulatory referral/consult to Optometry; Future; Expected date: 08/09/2022    H/o Cerebrovascular accident (CVA) of left pontine structure  -     Ambulatory referral/consult to Neurology; Future; Expected date: 08/09/2022        Instructed pt to bring all medications in a bag to every appointment she has just in case meds need to be reviewed. Two week follow up with me (40 minutes) to assess blood pressure and reconcile medications. Provided BP log sheet and instructed to check pressures twice daily. Instructed to contact clinic if she experiences symptoms on new medication or has persistently high or low blood pressures. Patient and family member verbalized understanding of this.     3 months with Dr. Mikal Wolfe PA-C  Family Medicine Physician Assistant       I spent a total of 40 minutes on the day of the visit.This includes face to face time and non-face to face time preparing to see the patient (eg, review of tests), obtaining and/or reviewing separately obtained history, documenting clinical information in the electronic or other health record, independently interpreting results and communicating results to the patient/family/caregiver, or care coordinator.      We have addressed [4] Moderate: 1 or more chronic illnesses with exacerbation, progression, or side effects of treatment / 2 or more stable chronic illnesses / 1 undiagnosed new problem with uncertain prognosis / 1 acute illness with systemic symptoms / 1 acute complicated injury  The  complexity of the data reviewed and analyzed for this visit was [2] Minimal or None  The risk of complications and/or morbidity or mortality are [4] Moderate risk (I.e. prescription drug management / decision regarding minor surgery with identified pt or procedure risk factors / decision regarding elective major surgery without identified pt or procedure risk factors / diagnosis or treatment significantly limited by social determinants of health)   The level of Medical Decision Making for this visit is [4] Moderate

## 2022-08-02 NOTE — PATIENT INSTRUCTIONS
Measure blood pressures twice daily at least one hour after taking blood pressure medications.   You should be taking hydralazine, diovan HCT, metoprolol, clonidine for your blood pressure.       Hi Steff,     If you are due for any health screening(s) below please notify me so we can arrange them to be ordered and scheduled to maintain your health. Most healthy patients complete it. Don't lose out on improving your health.     Tests to Keep You Healthy    Mammogram: ORDERED BUT NOT SCHEDULED  Eye Exam: SCHEDULED  Colon Cancer Screening: DUE  Blood Pressure <= 139/89: NO  HbA1c < 8: Yes     Breast Cancer Screening    Breast cancer is the second most common cancer in women after skin cancer, and the second leading cause of death from cancer after lung cancer. Mammograms can detect breast cancer early, which significantly increases the chances of curing the cancer.      A screening mammogram is an x-ray image of the breasts used for early breast cancer detection. It can help reduce the number of deaths from breast cancer among women. To get a clear image, the breast is placed between two plastic plates to make it flat. How often a mammogram is needed depends on your age and your breast cancer risk.    Although you are still overdue for this important screening, due to the COVID-19 pandemic, we recommend scheduling it in the near future.  Colon Cancer Screening    Of cancers affecting both men and women, colorectal cancer is the third leading cancer killer in the [place]. But it doesnt have to be. Screening can prevent colorectal cancer or find it at an early stage when treatment often leads to a cure.    A colonoscopy is the preferred test for detecting colon cancer. It is needed only once every 10 years if results are negative. While sedated, a flexible, lighted tube with a tiny camera is inserted into the rectum and advanced through the colon to look for cancers. An alternative screening test that is used  "at home and returned to the lab may also be used. It detects hidden blood in bowel movements which could indicate cancer in the colon. If results are positive, you will need a colonoscopy to determine if the blood is a sign of cancer. This type of follow up (diagnostic) colonoscopy usually requires additional copays as required by your insurance provider. Please contact your PCP if you have any questions.    Although you are still overdue for this important screening, due to the COVID-19 pandemic, we recommend scheduling it in the near future.       Diabetic Retinal Eye Exam    Diabetes is the #1 cause of blindness in the [place] - early detection before signs or symptoms develop can prevent debilitating blindness.    Once-a-year screening is recommended for all diabetic patients. This exam can prevent and treat diabetes complications in the eye before developing symptoms. This can be done with a special camera is used to take photographs of the back of your eye without having to dilate them, or you can see an eye doctor for a full dilated exam.    Although you are still overdue for this important screening, due to the COVID-19 pandemic, we recommend scheduling it in the near future.        Patient Education       Checking Your Blood Pressure at Home   The Basics   Written by the doctors and editors at [facility]   How is blood pressure measured? -- Blood pressure is usually measured with a device that goes around your upper arm. This is often done in a doctor's office. But some people also check their blood pressure themselves, at home or at work.  Blood pressure is explained with 2 numbers. For instance, your blood pressure might be "140 over 90." The first (top) number is the pressure inside your arteries when your heart is mercy. The second (bottom) number is the pressure inside your arteries when your heart is relaxed. The table shows how doctors and nurses define high and normal blood pressure (table 1).  If " "your blood pressure gets too high, it puts you at risk for heart attack, stroke, and kidney disease. High blood pressure does not usually cause symptoms. But it can be serious.  What is a home blood pressure meter? -- A home blood pressure meter (or "monitor") is a device you can use to check your blood pressure yourself. It has a cuff that goes around your upper arm (figure 1). Some devices have a cuff that goes around your wrist instead. But doctors aren't sure if these work as well. The meter also has a small screen, or dial, that shows your blood pressure numbers.  There are also special meters you can wear for a day or 2. These are different because they automatically check your blood pressure throughout the day and night, even while you are sleeping. If your doctor thinks you should use one of these devices, they will talk to you about how to wear it.  Why do I need to check my blood pressure at home? -- If your doctor knows or suspects that you have high blood pressure, they might want you to check it at home. There are a few reasons for this. Your doctor might want to look at:  Whether your blood pressure measures the same at home as it did in the doctor's office  How well your blood pressure medicines are working  Changes in your blood pressure, for example, if it goes up and down  People who check their own blood pressure at home usually do better at keeping it low.  How do I choose a home blood pressure meter? -- When choosing a home blood pressure meter, you will probably want to think about:  Cost - Some devices cost more than others. You should also check to see if your insurance will help pay for your device.  Size - It's important to make sure the cuff fits your arm comfortably. Your doctor or nurse can help you with this.  How easy it is to use - You should make sure you understand how to use the device. You also need to be able to read the numbers on the screen.  You do not need a prescription to " buy a home blood pressure meter. You can buy them at most pharmacies or over the internet. Your doctor or nurse can help you choose the right device for you.  How do I check my blood pressure at home? -- Once you have a home blood pressure meter, your doctor or nurse should check it to make sure it fits you and works correctly.  When it's time to check your blood pressure:  Go to the bathroom and empty your bladder first. Having a full bladder can temporarily increase your blood pressure, making the results inaccurate.  Sit in a chair with your feet flat on the ground.  Try to breathe normally and stay calm.  Attach the cuff to your arm. Place the cuff directly on your skin, not over your clothing. The cuff should be tight enough to not slip down, but not uncomfortably tight.  Sit and relax for about 3 to 5 minutes with the cuff on.  Follow the directions that came with your device to start measuring your blood pressure. This might involve squeezing the bulb at the end of the tube to inflate the cuff (fill it with air). With some monitors, you just need to press a button to inflate the cuff. When the cuff fills with air, it feels like someone is squeezing your arm, but it should not hurt. Then you will slowly deflate the cuff (let the air out of it), or it will deflate by itself. The screen or dial will show your blood pressure numbers.  Stay seated and relax for 1 minute, then measure your blood pressure again.  How often should I check my blood pressure? -- It depends. Different people need to follow different schedules. Your doctor or nurse will tell you how often to check your blood pressure, and when. Some people need to check their blood pressure twice a day, in the morning and evening.  Your doctor or nurse will probably tell you to keep track of your blood pressure for at least a few days (table 2). Then they will look at the numbers. The reason for this is that it's normal for your blood pressure to change  "a bit from day to day. For example, the numbers might change depending on whether you recently had caffeine, just exercised, or feel stressed. Checking your blood pressure over several days - or longer - will give your doctor or nurse a better idea of what is average for you.  How should I keep track of my blood pressure? -- Some blood pressure meters will record your numbers for you, or send them to your computer or smartphone. If yours does not do this, you will need to write them down. Your doctor or nurse can help you figure out the best way to keep track of the numbers.  What if my blood pressure is high? -- Your doctor or nurse will tell you what to do if your blood pressure is high when you check it at home. If you get a number that is higher than normal, measure it again to see if it is still high. If it is very high (above a certain number, which your doctor or nurse will tell you to watch out for), you should call your doctor right away.  If your blood pressure is only a little high, your doctor or nurse might tell you to keep checking it for a few more days or weeks, and then call if it does not go back down. Then they can help you decide what to do next.  All topics are updated as new evidence becomes available and our peer review process is complete.  This topic retrieved from [employer] on: Sep 21, 2021.  Topic 805913 Version 4.0  Release: 29.4.2 - C29.263  © 2021 UpToDate, Inc. and/or its affiliates. All rights reserved.  table 1: Definition of normal and high blood pressure  Level  Top number  Bottom number    High 130 or above 80 or above   Elevated 120 to 129 79 or below   Normal 119 or below 79 or below   These definitions are from the American College of Cardiology/American Heart Association. Other expert groups might use slightly different definitions.  "Elevated blood pressure" is a term doctor or nurses use as a warning. It means you do not yet have high blood pressure, but your blood pressure " is not as low as it should be for good health.  Graphic 45323 Version 6.0  figure 1: Using a home blood pressure meter     This is an example of a person using a home blood pressure meter.  Graphic 700064 Version 1.0    table 2: 7-day diary for checking blood pressure at home  Day 1  Day 2  Day 3  Day 4  Day 5  Day 6  Day 7    Morning  1st read Morning  1st read Morning  1st read Morning  1st read Morning  1st read Morning  1st read Morning  1st read   Systolic: __________ Systolic: __________ Systolic: __________ Systolic: __________ Systolic: __________ Systolic: __________ Systolic: __________   Diastolic: __________ Diastolic: __________ Diastolic: __________ Diastolic: __________ Diastolic: __________ Diastolic: __________ Diastolic: __________   Pulse: __________ Pulse: __________ Pulse: __________ Pulse: __________ Pulse: __________ Pulse: __________ Pulse: __________   Morning  2nd read Morning  2nd read Morning  2nd read Morning  2nd read Morning  2nd read Morning  2nd read Morning  2nd read   Systolic: __________ Systolic: __________ Systolic: __________ Systolic: __________ Systolic: __________ Systolic: __________ Systolic: __________   Diastolic: __________ Diastolic: __________ Diastolic: __________ Diastolic: __________ Diastolic: __________ Diastolic: __________ Diastolic: __________   Pulse: __________ Pulse: __________ Pulse: __________ Pulse: __________ Pulse: __________ Pulse: __________ Pulse: __________   Evening  1st read Evening  1st read Evening  1st read Evening  1st read Evening  1st read Evening  1st read Evening  1st read   Systolic: __________ Systolic: __________ Systolic: __________ Systolic: __________ Systolic: __________ Systolic: __________ Systolic: __________   Diastolic: __________ Diastolic: __________ Diastolic: __________ Diastolic: __________ Diastolic: __________ Diastolic: __________ Diastolic: __________   Pulse: __________ Pulse: __________ Pulse: __________ Pulse:  __________ Pulse: __________ Pulse: __________ Pulse: __________   Evening  2nd read Evening  2nd read Evening  2nd read Evening  2nd read Evening  2nd read Evening  2nd read Evening  2nd read   Systolic: __________ Systolic: __________ Systolic: __________ Systolic: __________ Systolic: __________ Systolic: __________ Systolic: __________   Diastolic: __________ Diastolic: __________ Diastolic: __________ Diastolic: __________ Diastolic: __________ Diastolic: __________ Diastolic: __________   Pulse: __________ Pulse: __________ Pulse: __________ Pulse: __________ Pulse: __________ Pulse: __________ Pulse: __________   Notes    Notes    Notes    Notes    Notes    Notes    Notes      ____________________ ____________________ ____________________ ____________________ ____________________ ____________________ ____________________   ____________________ ____________________ ____________________ ____________________ ____________________ ____________________ ____________________   ____________________ ____________________ ____________________ ____________________ ____________________ ____________________ ____________________   Patient name: ______________________________     Patient ID: ________________________________    Primary care provider: _______________________    Average BP: _______________________________    [facility] 215840 Version 1.0  Consumer Information Use and Disclaimer   This information is not specific medical advice and does not replace information you receive from your health care provider. This is only a brief summary of general information. It does NOT include all information about conditions, illnesses, injuries, tests, procedures, treatments, therapies, discharge instructions or life-style choices that may apply to you. You must talk with your health care provider for complete information about your health and treatment options. This information should not be used to decide whether or not to accept  your health care provider's advice, instructions or recommendations. Only your health care provider has the knowledge and training to provide advice that is right for you. The use of this information is governed by the [employer] End User License Agreement, available at [email or web address].The use of [employer] content is governed by the [employer] Terms of Use. ©2021 UpToDate, Inc. All rights reserved.  Copyright   © 2021 UpToDate, Inc. and/or its affiliates. All rights reserved.

## 2022-08-03 ENCOUNTER — OFFICE VISIT (OUTPATIENT)
Dept: ORTHOPEDICS | Facility: CLINIC | Age: 62
End: 2022-08-03
Payer: MEDICARE

## 2022-08-03 ENCOUNTER — HOSPITAL ENCOUNTER (OUTPATIENT)
Dept: RADIOLOGY | Facility: HOSPITAL | Age: 62
Discharge: HOME | End: 2022-08-03
Attending: ORTHOPAEDIC SURGERY
Payer: MEDICARE

## 2022-08-03 VITALS — HEIGHT: 59 IN | BODY MASS INDEX: 26.45 KG/M2 | WEIGHT: 131.19 LBS | RESPIRATION RATE: 18 BRPM

## 2022-08-03 DIAGNOSIS — M25.511 ACUTE PAIN OF RIGHT SHOULDER: ICD-10-CM

## 2022-08-03 DIAGNOSIS — M25.511 ACUTE PAIN OF RIGHT SHOULDER: Primary | ICD-10-CM

## 2022-08-03 PROCEDURE — 1159F MED LIST DOCD IN RCRD: CPT | Mod: CPTII,S$GLB,, | Performed by: ORTHOPAEDIC SURGERY

## 2022-08-03 PROCEDURE — 3008F BODY MASS INDEX DOCD: CPT | Mod: CPTII,S$GLB,, | Performed by: ORTHOPAEDIC SURGERY

## 2022-08-03 PROCEDURE — 3008F PR BODY MASS INDEX (BMI) DOCUMENTED: ICD-10-PCS | Mod: CPTII,S$GLB,, | Performed by: ORTHOPAEDIC SURGERY

## 2022-08-03 PROCEDURE — 73030 X-RAY EXAM OF SHOULDER: CPT | Mod: 26,RT,, | Performed by: RADIOLOGY

## 2022-08-03 PROCEDURE — 4010F PR ACE/ARB THEARPY RXD/TAKEN: ICD-10-PCS | Mod: CPTII,S$GLB,, | Performed by: ORTHOPAEDIC SURGERY

## 2022-08-03 PROCEDURE — 99999 PR PBB SHADOW E&M-EST. PATIENT-LVL IV: CPT | Mod: PBBFAC,,, | Performed by: ORTHOPAEDIC SURGERY

## 2022-08-03 PROCEDURE — 99213 OFFICE O/P EST LOW 20 MIN: CPT | Mod: S$GLB,,, | Performed by: ORTHOPAEDIC SURGERY

## 2022-08-03 PROCEDURE — 4010F ACE/ARB THERAPY RXD/TAKEN: CPT | Mod: CPTII,S$GLB,, | Performed by: ORTHOPAEDIC SURGERY

## 2022-08-03 PROCEDURE — 3051F HG A1C>EQUAL 7.0%<8.0%: CPT | Mod: CPTII,S$GLB,, | Performed by: ORTHOPAEDIC SURGERY

## 2022-08-03 PROCEDURE — 3051F PR MOST RECENT HEMOGLOBIN A1C LEVEL 7.0 - < 8.0%: ICD-10-PCS | Mod: CPTII,S$GLB,, | Performed by: ORTHOPAEDIC SURGERY

## 2022-08-03 PROCEDURE — 1159F PR MEDICATION LIST DOCUMENTED IN MEDICAL RECORD: ICD-10-PCS | Mod: CPTII,S$GLB,, | Performed by: ORTHOPAEDIC SURGERY

## 2022-08-03 PROCEDURE — 99213 PR OFFICE/OUTPT VISIT, EST, LEVL III, 20-29 MIN: ICD-10-PCS | Mod: S$GLB,,, | Performed by: ORTHOPAEDIC SURGERY

## 2022-08-03 PROCEDURE — 73030 X-RAY EXAM OF SHOULDER: CPT | Mod: TC,PN,RT

## 2022-08-03 PROCEDURE — 73030 XR SHOULDER COMPLETE 2 OR MORE VIEWS RIGHT: ICD-10-PCS | Mod: 26,RT,, | Performed by: RADIOLOGY

## 2022-08-03 PROCEDURE — 99999 PR PBB SHADOW E&M-EST. PATIENT-LVL IV: ICD-10-PCS | Mod: PBBFAC,,, | Performed by: ORTHOPAEDIC SURGERY

## 2022-08-03 NOTE — PROGRESS NOTES
8/3/2022      Past Medical History:   Diagnosis Date    Arthritis     Complete tear of left rotator cuff 11/09/2017    COPD (chronic obstructive pulmonary disease)     COVID-19 infection 07/02/2021    Diabetes mellitus     H/o Cerebrovascular accident (CVA) of left pontine structure 12/12/2019    Hypertension     Personal history of colonic polyps     Stroke     Wears glasses        Past Surgical History:   Procedure Laterality Date    COLONOSCOPY N/A 4/11/2017    Procedure: COLONOSCOPY;  Surgeon: Jared Antonio MD;  Location: [facility];  Service: Endoscopy;  Laterality: N/A;    HYSTERECTOMY      OOPHORECTOMY      SHOULDER SURGERY      R         Current Outpatient Medications:     atorvastatin (LIPITOR) 40 MG tablet, Take 1 tablet (40 mg total) by mouth once daily., Disp: 90 tablet, Rfl: 3    blood glucose control, low Soln, Use as directed (Patient taking differently: 1 each by Misc.(Non-Drug; Combo Route) route. Use as directed), Disp: 1 each, Rfl: 1    blood sugar diagnostic (TRUE METRIX GLUCOSE TEST STRIP) Strp, Inject 100 strips into the skin 2 (two) times a day. (Patient taking differently: Inject 1 strip into the skin 2 (two) times a day.), Disp: 100 strip, Rfl: 2    cloNIDine 0.1 mg/24 hr td ptwk (CATAPRES) 0.1 mg/24 hr, Place 1 patch onto the skin every 7 days., Disp: 12 patch, Rfl: 3    clopidogreL (PLAVIX) 75 mg tablet, Take 1 tablet (75 mg total) by mouth once daily., Disp: 120 tablet, Rfl: 0    ergocalciferol (ERGOCALCIFEROL) 50,000 unit Cap, Take 50,000 Units by mouth every 7 days., Disp: , Rfl:     hydrALAZINE (APRESOLINE) 100 MG tablet, Take 1 tablet (100 mg total) by mouth 3 (three) times daily., Disp: 270 tablet, Rfl: 3    HYDROcodone-acetaminophen (NORCO)  mg per tablet, , Disp: , Rfl:     insulin aspart U-100 (NOVOLOG FLEXPEN U-100 INSULIN) 100 unit/mL (3 mL) InPn pen, Inject 12 Units into the skin 3 (three) times daily before meals. 12 units before meals +  "correction scale. Max daily dose of 60 units., Disp: 45 mL, Rfl: 3    insulin degludec (TRESIBA FLEXTOUCH U-100) 100 unit/mL (3 mL) insulin pen, ADMINISTER 40 UNITS UNDER THE SKIN EVERY EVENING, Disp: 4 pen, Rfl: 0    lancets Misc, To check BG 2 times daily, to use with insurance preferred meter (Patient taking differently: 1 lancet by Misc.(Non-Drug; Combo Route) route 2 (two) times a day. To check BG 2 times daily, to use with insurance preferred meter), Disp: 200 each, Rfl: 3    pen needle, diabetic (BD MI 2ND GEN PEN NEEDLE) 32 gauge x 5/32" Ndle, Uses 4 daily (Patient taking differently: 1 pen by Misc.(Non-Drug; Combo Route) route 4 (four) times daily. Uses 4 daily), Disp: 400 each, Rfl: 4    umeclidinium (INCRUSE ELLIPTA) 62.5 mcg/actuation inhalation capsule, Inhale 62.5 mcg into the lungs once daily. Controller, Disp: , Rfl:     valsartan-hydrochlorothiazide (DIOVAN-HCT) 160-25 mg per tablet, Take 1 tablet by mouth once daily., Disp: 90 tablet, Rfl: 3    metoprolol succinate (TOPROL-XL) 100 MG 24 hr tablet, Take 1 tablet (100 mg total) by mouth once daily., Disp: 90 tablet, Rfl: 3    Allergies as of 08/03/2022    (No Known Allergies)       Family History   Problem Relation Age of Onset    Diabetes Mother     Asthma Mother     Hypertension Mother     Diabetes Father     Diabetes Brother     Hypertension Brother     Anemia Daughter     Glaucoma Neg Hx     Macular degeneration Neg Hx     Retinal detachment Neg Hx        Social History     Socioeconomic History    Marital status: Single   Occupational History     Comment: disabled due to shoulder problems   Tobacco Use    Smoking status: Never Smoker    Smokeless tobacco: Never Used   Substance and Sexual Activity    Alcohol use: No    Drug use: No    Sexual activity: Not Currently             HPI:  Patient is being seen initially today with right shoulder pain the pain is been present for the last 3 years but has gotten worse recently " complains of pain at night inability to move the arm has a history of rotator cuff disease in the left shoulder in the past patient had rotator cuff repair of the left shoulder in 2018 by Dr. Carlson.  Patient also had history of surgery done around 2006 at [facility].  Patient has severe diabetes also has a history of CVAs in the past affecting her right side    Review of Systems   Constitution: Negative for chills and fever.   HENT: Negative for headaches and blurry vision.   Cardiovascular: Negative for chest pain, irregular heartbeat, leg swelling and palpitations.   Respiratory: Negative for cough and shortness of breath.   Endocrine: Negative for polyuria.   Hematologic/Lymphatic: Negative for bleeding problem. Does not bruise/bleed easily.   Skin: Negative for poor wound healing and rash.   Musculoskeletal: Negative for joint pain. Negative for arthritis, joint swelling, muscle weakness and myalgias.   Gastrointestinal: Negative for abdominal pain, heartburn, melena, nausea and vomiting.   Genitourinary: Negative for bladder incontinence and dysuria.   Neurological: Negative for numbness.   Psychiatric/Behavioral: Negative for depression. The patient is not nervous/anxious.     PE:  [unfilled]    A&Ox3, NAD  Neck-supple with no pain neurovascularly intact both upper extremities  RUE examination of the right shoulder patient does have some restricted range of motion she does appear to have much in way of pain today she has some crepitance on range of motion weakness in abduction strength patient has a healed scar over the clavicle laterally and along the lateral deltoid LUE no swelling deformity of pain  Pelvis no pelvic pain hip pain  Back no back pain straight leg raise negative  RLE no swelling or deformity  LLE no swelling deformity    Xrays:  X-ray of her right shoulder shows proximal migration of the humeral head changes in the acromioclavicular joint changes in the glenohumeral joint all  these appear to be consistent with chronic rotator cuff disease        Labs:    No results found for this or any previous visit (from the past 24 hour(s)).    Assessment/Plan:   Patient has a history of severe rotator cuff disease she has some stiffness in her left shoulder she also has a history of a CVA.  Patient states that her blood glucose can get out of control with cortisone injection.  Going to go ahead and send her to physical therapy for some active assisted range of motion of the right shoulder will see her back in 6 weeks

## 2022-08-09 ENCOUNTER — PES CALL (OUTPATIENT)
Dept: ADMINISTRATIVE | Facility: CLINIC | Age: 62
End: 2022-08-09
Payer: MEDICARE

## 2022-08-11 ENCOUNTER — TELEPHONE (OUTPATIENT)
Dept: FAMILY MEDICINE | Facility: CLINIC | Age: 62
End: 2022-08-11
Payer: MEDICARE

## 2022-08-11 NOTE — TELEPHONE ENCOUNTER
Unable to reach patient by phone so I sent a email informing her appointment time was change for 8/16 (appointment was taking Hospital follow up slot) patient was moved to the 11:20 am time slot

## 2022-08-24 ENCOUNTER — PATIENT MESSAGE (OUTPATIENT)
Dept: ADMINISTRATIVE | Facility: HOSPITAL | Age: 62
End: 2022-08-24
Payer: MEDICARE

## 2022-10-06 ENCOUNTER — PATIENT OUTREACH (OUTPATIENT)
Dept: ADMINISTRATIVE | Facility: HOSPITAL | Age: 62
End: 2022-10-06
Payer: MEDICARE

## 2022-10-11 ENCOUNTER — PATIENT MESSAGE (OUTPATIENT)
Dept: ADMINISTRATIVE | Facility: HOSPITAL | Age: 62
End: 2022-10-11
Payer: MEDICARE

## 2022-10-12 ENCOUNTER — PATIENT OUTREACH (OUTPATIENT)
Dept: ADMINISTRATIVE | Facility: HOSPITAL | Age: 62
End: 2022-10-12
Payer: MEDICARE

## 2022-10-17 NOTE — PT/OT/SLP PROGRESS
Physical Therapy Treatment    Patient Name:  Steff Johnson   MRN:  [id number]    Recommendations:     Discharge Recommendations:  home health PT   Discharge Equipment Recommendations: none   Barriers to discharge: None    Assessment:     Steff Johnson is a 61 y.o. female admitted with a medical diagnosis of Acute CVA (cerebrovascular accident).  She presents with the following impairments/functional limitations:  weakness,impaired endurance,impaired sensation,impaired self care skills,impaired functional mobilty,gait instability,impaired balance       Pt agree to tx.Bed mobility sba Amb with one lob that therapist had to correct. Pt req min /mod assist with amb. Has a slight lean to left that causes her to be unsteady..    Rehab Prognosis: Good; patient would benefit from acute skilled PT services to address these deficits and reach maximum level of function.    Recent Surgery: * No surgery found *      Plan:     During this hospitalization, patient to be seen 6 x/week to address the identified rehab impairments via gait training,therapeutic activities,therapeutic exercises and progress toward the following goals:    · Plan of Care Expires:  02/06/22    Subjective     Chief Complaint: none  Patient/Family Comments/goals: none  Pain/Comfort:  ·        Objective:     Communicated with nsg prior to session.  Patient found supine with   upon PT entry to room.     General Precautions: Standard, fall   Orthopedic Precautions:N/A   Braces:    Respiratory Status: Room air     Functional Mobility:  · Gait: Pt amb 100 ft r/w min/mod assit .Pt not safe to amb without assist at this time.      AM-PAC 6 CLICK MOBILITY          Therapeutic Activities and Exercises:   Pt performed bed mobility supine to sit sba. Pt sit eob for midline orientation and trunk control supervision.    Patient left up in chair with call button in reach..    GOALS:   Multidisciplinary Problems     Physical Therapy Goals        Problem: Physical Therapy  Goal    Goal Priority Disciplines Outcome Goal Variances Interventions   Physical Therapy Goal     PT, PT/OT Ongoing, Progressing     Description: Goals to be met by: Discharge     Patient will increase functional independence with mobility by performin. Supine to sit with Independent  2. Sit to stand transfer with Supervision  3. Bed to chair transfer with Supervision using Rolling Walker  4. Gait  x 100 feet with Supervision using Rolling Walker.                          Time Tracking:     PT Received On: 22  PT Start Time: 931     PT Stop Time: 954  PT Total Time (min): 23 min     Billable Minutes: Gait Training 13 and Therapeutic Activity 10             PTA Visit Number: 0     2022   Griseofulvin Pregnancy And Lactation Text: This medication is Pregnancy Category X and is known to cause serious birth defects. It is unknown if this medication is excreted in breast milk but breast feeding should be avoided.

## 2022-11-08 ENCOUNTER — TELEPHONE (OUTPATIENT)
Dept: FAMILY MEDICINE | Facility: CLINIC | Age: 62
End: 2022-11-08
Payer: MEDICARE

## 2022-11-08 DIAGNOSIS — Z79.4 TYPE 2 DIABETES MELLITUS WITH DIABETIC NEPHROPATHY, WITH LONG-TERM CURRENT USE OF INSULIN: ICD-10-CM

## 2022-11-08 DIAGNOSIS — E11.21 TYPE 2 DIABETES MELLITUS WITH DIABETIC NEPHROPATHY, WITH LONG-TERM CURRENT USE OF INSULIN: ICD-10-CM

## 2022-11-08 RX ORDER — INSULIN DEGLUDEC 100 U/ML
INJECTION, SOLUTION SUBCUTANEOUS
Qty: 4 PEN | Refills: 0 | Status: CANCELLED | OUTPATIENT
Start: 2022-11-08

## 2022-11-08 NOTE — TELEPHONE ENCOUNTER
Called pt regarding below message. Attempted to speak with patient, then the phone call was disconnected. Called back with no answer. Unable to leave voicemail

## 2022-11-08 NOTE — TELEPHONE ENCOUNTER
----- Message from Marta Stephen sent at 11/8/2022 10:18 AM CST -----  Contact: Patient  Type:  RX Refill Request    Who Called: Patient  Refill or New Rx: refill  RX Name and Strength: insulin degludec (TRESIBA FLEXTOUCH U-100) 100 unit/mL (3 mL) insulin pen  How is the patient currently taking it? (ex. 1XDay):    Is this a 30 day or 90 day RX: Inject 12 Units into the skin 3 (three) times daily before meals. 12 units before meals + correction scale. Max daily dose of 60 units., Starting Tue 8/2/2022, Normal  Dispense: 45 mL      [id number] [address] & [address] [address]  Phone: [phone] Fax: [phone]     Best Call Back Number: [phone]

## 2022-11-09 ENCOUNTER — OFFICE VISIT (OUTPATIENT)
Dept: FAMILY MEDICINE | Facility: CLINIC | Age: 62
End: 2022-11-09
Payer: MEDICARE

## 2022-11-09 VITALS
HEART RATE: 68 BPM | RESPIRATION RATE: 16 BRPM | DIASTOLIC BLOOD PRESSURE: 100 MMHG | BODY MASS INDEX: 26.89 KG/M2 | WEIGHT: 133.38 LBS | HEIGHT: 59 IN | TEMPERATURE: 98 F | OXYGEN SATURATION: 97 % | SYSTOLIC BLOOD PRESSURE: 180 MMHG

## 2022-11-09 DIAGNOSIS — J44.9 CHRONIC OBSTRUCTIVE PULMONARY DISEASE, UNSPECIFIED COPD TYPE: Primary | ICD-10-CM

## 2022-11-09 DIAGNOSIS — E55.9 VITAMIN D DEFICIENCY: ICD-10-CM

## 2022-11-09 DIAGNOSIS — I16.0 HYPERTENSIVE URGENCY: ICD-10-CM

## 2022-11-09 DIAGNOSIS — E11.21 TYPE 2 DIABETES MELLITUS WITH DIABETIC NEPHROPATHY, WITH LONG-TERM CURRENT USE OF INSULIN: ICD-10-CM

## 2022-11-09 DIAGNOSIS — E78.2 MIXED HYPERLIPIDEMIA: ICD-10-CM

## 2022-11-09 DIAGNOSIS — I10 UNCONTROLLED HYPERTENSION: ICD-10-CM

## 2022-11-09 DIAGNOSIS — Z79.4 TYPE 2 DIABETES MELLITUS WITH DIABETIC NEPHROPATHY, WITH LONG-TERM CURRENT USE OF INSULIN: ICD-10-CM

## 2022-11-09 DIAGNOSIS — I10 HYPERTENSION, UNCONTROLLED: ICD-10-CM

## 2022-11-09 DIAGNOSIS — Z23 FLU VACCINE NEED: ICD-10-CM

## 2022-11-09 DIAGNOSIS — N18.32 STAGE 3B CHRONIC KIDNEY DISEASE: ICD-10-CM

## 2022-11-09 DIAGNOSIS — I63.9 CEREBROVASCULAR ACCIDENT (CVA) OF LEFT PONTINE STRUCTURE: ICD-10-CM

## 2022-11-09 PROCEDURE — 3080F DIAST BP >= 90 MM HG: CPT | Mod: CPTII,S$GLB,, | Performed by: FAMILY MEDICINE

## 2022-11-09 PROCEDURE — 3077F PR MOST RECENT SYSTOLIC BLOOD PRESSURE >= 140 MM HG: ICD-10-PCS | Mod: CPTII,S$GLB,, | Performed by: FAMILY MEDICINE

## 2022-11-09 PROCEDURE — 3008F PR BODY MASS INDEX (BMI) DOCUMENTED: ICD-10-PCS | Mod: CPTII,S$GLB,, | Performed by: FAMILY MEDICINE

## 2022-11-09 PROCEDURE — 99999 PR PBB SHADOW E&M-EST. PATIENT-LVL III: CPT | Mod: PBBFAC,,, | Performed by: FAMILY MEDICINE

## 2022-11-09 PROCEDURE — 3077F SYST BP >= 140 MM HG: CPT | Mod: CPTII,S$GLB,, | Performed by: FAMILY MEDICINE

## 2022-11-09 PROCEDURE — G0008 ADMIN INFLUENZA VIRUS VAC: HCPCS | Mod: S$GLB,,, | Performed by: FAMILY MEDICINE

## 2022-11-09 PROCEDURE — 1160F PR REVIEW ALL MEDS BY PRESCRIBER/CLIN PHARMACIST DOCUMENTED: ICD-10-PCS | Mod: CPTII,S$GLB,, | Performed by: FAMILY MEDICINE

## 2022-11-09 PROCEDURE — 99214 PR OFFICE/OUTPT VISIT, EST, LEVL IV, 30-39 MIN: ICD-10-PCS | Mod: 25,S$GLB,, | Performed by: FAMILY MEDICINE

## 2022-11-09 PROCEDURE — 90686 FLU VACCINE (QUAD) GREATER THAN OR EQUAL TO 3YO PRESERVATIVE FREE IM: ICD-10-PCS | Mod: S$GLB,,, | Performed by: FAMILY MEDICINE

## 2022-11-09 PROCEDURE — 3008F BODY MASS INDEX DOCD: CPT | Mod: CPTII,S$GLB,, | Performed by: FAMILY MEDICINE

## 2022-11-09 PROCEDURE — 1159F MED LIST DOCD IN RCRD: CPT | Mod: CPTII,S$GLB,, | Performed by: FAMILY MEDICINE

## 2022-11-09 PROCEDURE — 4010F ACE/ARB THERAPY RXD/TAKEN: CPT | Mod: CPTII,S$GLB,, | Performed by: FAMILY MEDICINE

## 2022-11-09 PROCEDURE — 99214 OFFICE O/P EST MOD 30 MIN: CPT | Mod: 25,S$GLB,, | Performed by: FAMILY MEDICINE

## 2022-11-09 PROCEDURE — 1159F PR MEDICATION LIST DOCUMENTED IN MEDICAL RECORD: ICD-10-PCS | Mod: CPTII,S$GLB,, | Performed by: FAMILY MEDICINE

## 2022-11-09 PROCEDURE — 90686 IIV4 VACC NO PRSV 0.5 ML IM: CPT | Mod: S$GLB,,, | Performed by: FAMILY MEDICINE

## 2022-11-09 PROCEDURE — 4010F PR ACE/ARB THEARPY RXD/TAKEN: ICD-10-PCS | Mod: CPTII,S$GLB,, | Performed by: FAMILY MEDICINE

## 2022-11-09 PROCEDURE — G0008 FLU VACCINE (QUAD) GREATER THAN OR EQUAL TO 3YO PRESERVATIVE FREE IM: ICD-10-PCS | Mod: S$GLB,,, | Performed by: FAMILY MEDICINE

## 2022-11-09 PROCEDURE — 3051F HG A1C>EQUAL 7.0%<8.0%: CPT | Mod: CPTII,S$GLB,, | Performed by: FAMILY MEDICINE

## 2022-11-09 PROCEDURE — 99999 PR PBB SHADOW E&M-EST. PATIENT-LVL III: ICD-10-PCS | Mod: PBBFAC,,, | Performed by: FAMILY MEDICINE

## 2022-11-09 PROCEDURE — 3080F PR MOST RECENT DIASTOLIC BLOOD PRESSURE >= 90 MM HG: ICD-10-PCS | Mod: CPTII,S$GLB,, | Performed by: FAMILY MEDICINE

## 2022-11-09 PROCEDURE — 1160F RVW MEDS BY RX/DR IN RCRD: CPT | Mod: CPTII,S$GLB,, | Performed by: FAMILY MEDICINE

## 2022-11-09 PROCEDURE — 3051F PR MOST RECENT HEMOGLOBIN A1C LEVEL 7.0 - < 8.0%: ICD-10-PCS | Mod: CPTII,S$GLB,, | Performed by: FAMILY MEDICINE

## 2022-11-09 RX ORDER — INSULIN ASPART 100 [IU]/ML
14 INJECTION, SOLUTION INTRAVENOUS; SUBCUTANEOUS
Qty: 45 ML | Refills: 3 | Status: SHIPPED | OUTPATIENT
Start: 2022-11-09 | End: 2023-02-16 | Stop reason: SDUPTHER

## 2022-11-09 RX ORDER — LANCETS
1 EACH MISCELLANEOUS 2 TIMES DAILY
Qty: 200 EACH | Refills: 3 | OUTPATIENT
Start: 2022-11-09 | End: 2023-02-16 | Stop reason: SDUPTHER

## 2022-11-09 RX ORDER — CLONIDINE 0.1 MG/24H
1 PATCH, EXTENDED RELEASE TRANSDERMAL
Qty: 12 PATCH | Refills: 1 | Status: SHIPPED | OUTPATIENT
Start: 2022-11-09 | End: 2023-02-16 | Stop reason: SDUPTHER

## 2022-11-09 RX ORDER — UMECLIDINIUM 62.5 UG/1
62.5 AEROSOL, POWDER ORAL DAILY
Qty: 90 EACH | Refills: 1 | Status: SHIPPED | OUTPATIENT
Start: 2022-11-09 | End: 2022-11-09

## 2022-11-09 RX ORDER — TIOTROPIUM BROMIDE 18 UG/1
18 CAPSULE ORAL; RESPIRATORY (INHALATION) DAILY
Qty: 90 CAPSULE | Refills: 3 | Status: SHIPPED | OUTPATIENT
Start: 2022-11-09 | End: 2023-02-16 | Stop reason: SDUPTHER

## 2022-11-09 RX ORDER — VALSARTAN AND HYDROCHLOROTHIAZIDE 160; 25 MG/1; MG/1
1 TABLET ORAL DAILY
Qty: 90 TABLET | Refills: 1 | Status: SHIPPED | OUTPATIENT
Start: 2022-11-09 | End: 2023-02-16

## 2022-11-09 RX ORDER — ERGOCALCIFEROL 1.25 MG/1
50000 CAPSULE ORAL
Qty: 12 CAPSULE | Refills: 1 | Status: SHIPPED | OUTPATIENT
Start: 2022-11-09 | End: 2023-02-16 | Stop reason: SDUPTHER

## 2022-11-09 RX ORDER — PEN NEEDLE, DIABETIC 30 GX3/16"
1 NEEDLE, DISPOSABLE MISCELLANEOUS 4 TIMES DAILY
Qty: 300 EACH | Refills: 5 | Status: SHIPPED | OUTPATIENT
Start: 2022-11-09 | End: 2023-02-16 | Stop reason: SDUPTHER

## 2022-11-09 RX ORDER — METOPROLOL SUCCINATE 100 MG/1
100 TABLET, EXTENDED RELEASE ORAL DAILY
Qty: 90 TABLET | Refills: 1 | Status: SHIPPED | OUTPATIENT
Start: 2022-11-09 | End: 2023-02-16 | Stop reason: SDUPTHER

## 2022-11-09 RX ORDER — HYDRALAZINE HYDROCHLORIDE 100 MG/1
100 TABLET, FILM COATED ORAL 3 TIMES DAILY
Qty: 270 TABLET | Refills: 1 | Status: SHIPPED | OUTPATIENT
Start: 2022-11-09 | End: 2023-02-16 | Stop reason: SDUPTHER

## 2022-11-09 RX ORDER — ATORVASTATIN CALCIUM 40 MG/1
40 TABLET, FILM COATED ORAL DAILY
Qty: 90 TABLET | Refills: 1 | Status: SHIPPED | OUTPATIENT
Start: 2022-11-09 | End: 2023-02-16 | Stop reason: SDUPTHER

## 2022-11-09 RX ORDER — INSULIN DEGLUDEC 100 U/ML
INJECTION, SOLUTION SUBCUTANEOUS
Qty: 12 PEN | Refills: 1 | Status: SHIPPED | OUTPATIENT
Start: 2022-11-09 | End: 2023-02-16 | Stop reason: SDUPTHER

## 2022-11-09 RX ORDER — CLOPIDOGREL BISULFATE 75 MG/1
75 TABLET ORAL DAILY
Qty: 90 TABLET | Refills: 1 | Status: SHIPPED | OUTPATIENT
Start: 2022-11-09 | End: 2023-02-16 | Stop reason: SDUPTHER

## 2022-11-09 NOTE — PROGRESS NOTES
Subjective:       Patient ID: Steff Johnson is a 62 y.o. female.    Chief Complaint: Follow-up (3mth f/u)    HPI  Review of Systems   Constitutional:  Negative for fatigue and unexpected weight change.   Respiratory:  Negative for chest tightness and shortness of breath.    Cardiovascular:  Negative for chest pain, palpitations and leg swelling.   Gastrointestinal:  Negative for abdominal pain.   Musculoskeletal:  Positive for arthralgias.   Neurological:  Negative for dizziness, syncope, light-headedness and headaches.     Patient Active Problem List   Diagnosis    Hypertension associated with diabetes    COPD (chronic obstructive pulmonary disease)    Hyperglycemia due to type 2 diabetes mellitus    Proteinuria    Complete tear of left rotator cuff    Left shoulder pain    Shoulder stiffness, left    Shoulder weakness    Type 2 diabetes mellitus with diabetic nephropathy    Mixed hyperlipidemia    Type 2 diabetes mellitus with both eyes affected by moderate nonproliferative retinopathy and macular edema, with long-term current use of insulin    H/o Cerebrovascular accident (CVA) of left pontine structure    H/o Cranial nerve III palsy, left    Gait abnormality    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    Last visit with me 2/2022. Was supposed to come in for monthly appts but did not  Has eye exam scheduled 2/9/2022 Dr. Barros      Presents today  for check up. Significant other whom she lives with Nish Marti here today. He is also unfamiliar with her meds and dosing but willing to help assist.  IIC signed 2/2022.  Patient and sig other state  they have only been getting diovan hct and insulin from [facility]. No other meds. Is supposed to be on 4 HTN meds-catapres, hydralazine, diovan hct and toprol. States she has been out of pain meds since 8/22. Was referred from [facility] to ortho Dr. Del Angel who wanted to give her a shot.  States she is crying at night from shoulder pain . Recommended she go back to [facility] for pain meds. Checking BS and running 130-140.  A1c 5/22 7.2      Admitted 5/2022 [facility] for right sided weakness and blurry vision. Catapres patch had fallen off few days ago. Had htn urgency 162-245 systolic.   CTA head was negative. CTA head and neck showed no clinically significant stenosis, large vessel occlusion or aneurysm.  EKG showed sinus rhythm with nonspecific T-wave abnormality  Hospital course  Patient was admitted with severely elevated hypertensive emergency with possible TIA symptoms.  Her blood pressure was only minimally treated at admission because of suspicion for stroke.  Neurology reviewed.  Stroke workup was negative except her previous stroke finding in MRI.  Later her pressure was aggressively treated and blood pressure was acceptable and discharged in stable condition.  Echo was repeated and normal and no intracardiac shunt.  Symptoms appear to be from hypertensive emergency and later medication were adjusted.  She was on chlorthalidone and replaced with Lasix.  Restarted her clonidine patch and appointment given with Dr. Magana neurologist for follow-up.  She was advised to see primary care doctor as soon as possible for blood pressure check.  Her aspirin was replaced with Plavix.    Neuro Dr. Magana CVA -last appt 2/2022      Admitted [facility] 1/5/2022 for acute CVA-right sided weakness and slurred speech. CT head . New punctate hyperattenuating focus in the right basal ganglia suspicious for a small petechial bleed.  2. No hydrocephalus, herniation or midline shift.  3. Additional chronic/involutional findings as noted  above.  MRI/MRA brain This is examination is of significantly limited diagnostic quality, substantially degraded by patient motion despite repeated imaging. The examination should be repeated when clinically able.   Small area of presumed restricted diffusion within the upper regina suspicious for region of recent small vessel infarction.   Focal area of decreased signal within the right caudate head which corresponds to a punctate focus of increased density on recent CT. A small area of recent punctate hemorrhages not excluded. There are additional foci of hemosiderin staining observed within the leftward basal ganglia and medial right cerebellar hemisphere.   Changes compatible with chronic microvascular ischemia and mild involutional changes. MRA- Markedly limited, essentially nondiagnostic MRA of the brain, but with at least some flow identified in the intracranial circulation.     CTA head and neck 1. Focal minimal calcified plaque at origin of left cervical ICA, without narrowing.  2. Otherwise negative CTA brain and neck.   CT head repeated 1/6/2022 1. Stable CT appearance of the brain compared to January 5. Specifically, tiny hyperdensity at the caudate nucleus on the right, presumably a small focus of petechial hemorrhage, is stable.  Hospital Course:   Pt with H/o HTN/DM /CVA h/o ,admitted with acute CVA  Pt was evaluated by Neurology team , PT/OT/ST team  Later pt was discharged to home with HH      History:   Admitted 11/23/2021 [facility] for 1 day finger pain after fall. She is hypertensive 233/132 she was treated with hydralazine IV with improvement in her blood pressure. Found to have finger tip infection s/p I & D. Admitted for IV abx     Admitted 7/4/2021 for fall, difficulty with balance. She had come to emergency room couple days before and ER physician wanted to admit here however she decided to go home against medical advice.  She also admits that she is not taking her medications as prescribed and  previous notes mentioned that she has a poor compliance with her antihypertensive regimen as well as insulin regimen.  In ED patient was found to have very elevated blood pressure, elevated troponin, and she was incidentally found to have COVID-19 infection.  Decision was made to admit her for right-sided weakness, frequent falls, elevated troponin. Hospital Course: She underwent extensive neurocardiac workup which showed micro hemorrhages.  She was cleared for discharge by Neurology on aspirin and statin.  It appears that patient has very poorly controlled hypertension, already has residual right-sided weakness, poor vision     Admitted 3/31/2021 [facility] after running out of medsd in HTN urgency     Admitted [facility] 12/12/19 for left pontine lacunar infarct basilar artery verified by MRI brain. Sx of being off balance and eye problem. Echo normal.  Started on asa and plavix x 21 d then asa.  Sent home with HH and PT/OT/ST. Neuro Mushtaq NiñoCheco.  Still having double vision and balance issues. Never got  Walker.      Endocrine Dr. Blanco-last visit 6/2021 -poorly controlled DM due to NC , vit d def.  Lipids at goal. taking tresiba 40 u at night and novolog 14u tid + correction per SS u with meals tid.  checking BS qid.      Ophth Dr. Rousseau/now Bernard/Arnol Complications from DM-prolif retinopathy and nephropathy, ocular htn. Has appt Dr. Barros 12/14/22     Nephro Dr. Echols/Mary CKD stage 3, malignant HTN . GFR 39 1/6/2022      Pain mgmt Dr. Costello treating chronic shoulder pain norco 10 tid. Dps checked no evidence of diversion        Mammo scheduled 3/20   Objective:      Physical Exam  Vitals and nursing note reviewed.   Constitutional:       Appearance: She is well-developed.      Comments: In manual wheelchair   Cardiovascular:      Rate and Rhythm: Normal rate and regular rhythm.      Heart sounds: Normal heart sounds.   Pulmonary:      Effort: Pulmonary effort is normal.      Breath sounds: Normal breath  "sounds.   Skin:     General: Skin is warm and dry.   Neurological:      Mental Status: She is alert and oriented to person, place, and time.       Assessment:       1. Chronic obstructive pulmonary disease, unspecified COPD type    2. Mixed hyperlipidemia    3. Hypertension, uncontrolled    4. Type 2 diabetes mellitus with diabetic nephropathy, with long-term current use of insulin    5. Uncontrolled hypertension    6. Hypertensive urgency    7. Stage 3b chronic kidney disease    8. Vitamin D deficiency    9. H/o Cerebrovascular accident (CVA) of left pontine structure    10. Flu vaccine need        Plan:         1. Mixed hyperlipidemia  restart  - atorvastatin (LIPITOR) 40 MG tablet; Take 1 tablet (40 mg total) by mouth once daily.  Dispense: 90 tablet; Refill: 1  - Lipid Panel; Future    2. Hypertension, uncontrolled  Restart 4 HTN meds-reviewed with patient and caregiver.  Catapres, hydralazine, valsartan HCT, toprol  - hydrALAZINE (APRESOLINE) 100 MG tablet; Take 1 tablet (100 mg total) by mouth 3 (three) times daily.  Dispense: 270 tablet; Refill: 1    3. Type 2 diabetes mellitus with diabetic nephropathy, with long-term current use of insulin  Stable condition.  Continue current medications.  Will adjust based on lab findings or if condition changes.    - insulin aspart U-100 (NOVOLOG FLEXPEN U-100 INSULIN) 100 unit/mL (3 mL) InPn pen; Inject 14 Units subcutaneously into the skin 3 (three) times daily before meals.  Dispense: 45 mL; Refill: 3  - insulin degludec (TRESIBA FLEXTOUCH U-100) 100 unit/mL (3 mL) insulin pen; ADMINISTER 40 UNITS UNDER THE SKIN EVERY EVENING Strength: 100 unit/mL (3 mL)  Dispense: 12 pen; Refill: 1  - pen needle, diabetic (BD MI 2ND GEN PEN NEEDLE) 32 gauge x 5/32" Ndle; use as directed four times daily  Dispense: 400 each; Refill: 3  - lancets Misc; 1 lancet by Misc.(Non-Drug; Combo Route) route 2 (two) times a day. To check BG 2 times daily, to use with insurance preferred meter  " Dispense: 200 each; Refill: 3  - blood sugar diagnostic (TRUE METRIX GLUCOSE TEST STRIP) Strp; Test blood sugar twice daily  Dispense: 200 strip; Refill: 3  - CBC Auto Differential; Future  - Comprehensive Metabolic Panel; Future  - Hemoglobin A1C; Future  - Microalbumin/Creatinine Ratio, Urine; Future    4. Uncontrolled hypertension  See above  - cloNIDine 0.1 mg/24 hr td ptwk (CATAPRES) 0.1 mg/24 hr; Place 1 patch onto the skin every 7 days.  Dispense: 12 patch; Refill: 1  - metoprolol succinate (TOPROL-XL) 100 MG 24 hr tablet; Take 1 tablet (100 mg total) by mouth once daily.  Dispense: 90 tablet; Refill: 1    5. Hypertensive urgency  See above  - valsartan-hydrochlorothiazide (DIOVAN-HCT) 160-25 mg per tablet; Take 1 tablet by mouth once daily.  Dispense: 90 tablet; Refill: 1    6. Chronic obstructive pulmonary disease, unspecified COPD type  Change incruse to (due to formulary)  - tiotropium (SPIRIVA) 18 mcg inhalation capsule; Inhale 1 capsule (18 mcg total) into the lungs once daily. Controller  Dispense: 90 capsule; Refill: 3  Cont albuterol prn    7. Stage 3b chronic kidney disease  Stable and chronic.  Will continue to monitor q3-6 months and control chronic conditions as optimally as possible to preserve function.      8. Vitamin D deficiency  Continue supplement and monitor  - ergocalciferol (ERGOCALCIFEROL) 50,000 unit Cap; Take 1 capsule (50,000 Units total) by mouth every 7 days.  Dispense: 12 capsule; Refill: 1  - Vitamin D; Future    9. H/o Cerebrovascular accident (CVA) of left pontine structure  Cont   - clopidogreL (PLAVIX) 75 mg tablet; Take 1 tablet (75 mg total) by mouth once daily.  Dispense: 90 tablet; Refill: 1    10. Flu vaccine need  Immunize today.  Counseled patient on risks, benefits and side effects.  Patient elected to proceed with vaccination.    - Influenza - Quadrivalent *Preferred* (6 months+) (PF)      Time spent with patient: 20 minutes    Patient with be reevaluated in 4 weeks  or sooner prn    Greater than 50% of this visit was spent counseling as described in above documentation:Yes

## 2022-11-11 ENCOUNTER — PES CALL (OUTPATIENT)
Dept: ADMINISTRATIVE | Facility: CLINIC | Age: 62
End: 2022-11-11
Payer: MEDICARE

## 2022-12-01 ENCOUNTER — HOSPITAL ENCOUNTER (EMERGENCY)
Facility: HOSPITAL | Age: 62
Discharge: HOME | End: 2022-12-01
Attending: EMERGENCY MEDICINE
Payer: MEDICARE

## 2022-12-01 VITALS
OXYGEN SATURATION: 97 % | SYSTOLIC BLOOD PRESSURE: 169 MMHG | DIASTOLIC BLOOD PRESSURE: 79 MMHG | HEART RATE: 77 BPM | HEIGHT: 59 IN | TEMPERATURE: 98 F | WEIGHT: 130 LBS | RESPIRATION RATE: 18 BRPM | BODY MASS INDEX: 26.21 KG/M2

## 2022-12-01 DIAGNOSIS — T30.0 BURN: Primary | ICD-10-CM

## 2022-12-01 PROCEDURE — 99284 EMERGENCY DEPT VISIT MOD MDM: CPT | Mod: 25

## 2022-12-01 PROCEDURE — 90471 IMMUNIZATION ADMIN: CPT | Performed by: NURSE PRACTITIONER

## 2022-12-01 PROCEDURE — 90714 TD VACC NO PRESV 7 YRS+ IM: CPT | Performed by: NURSE PRACTITIONER

## 2022-12-01 PROCEDURE — 63600175 PHARM REV CODE 636 W HCPCS: Performed by: NURSE PRACTITIONER

## 2022-12-01 PROCEDURE — 25000003 PHARM REV CODE 250: Performed by: EMERGENCY MEDICINE

## 2022-12-01 RX ORDER — SILVER SULFADIAZINE 10 G/1000G
1 CREAM TOPICAL
Status: COMPLETED | OUTPATIENT
Start: 2022-12-01 | End: 2022-12-01

## 2022-12-01 RX ORDER — SILVER SULFADIAZINE 10 G/1000G
CREAM TOPICAL 2 TIMES DAILY
Qty: 50 G | Refills: 0 | Status: SHIPPED | OUTPATIENT
Start: 2022-12-01 | End: 2022-12-11

## 2022-12-01 RX ADMIN — SILVER SULFADIAZINE 1 TUBE: 10 CREAM TOPICAL at 12:12

## 2022-12-01 RX ADMIN — TETANUS AND DIPHTHERIA TOXOIDS ADSORBED 0.5 ML: 2; 2 INJECTION INTRAMUSCULAR at 12:12
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
## 2022-12-02 ENCOUNTER — PATIENT MESSAGE (OUTPATIENT)
Dept: ADMINISTRATIVE | Facility: HOSPITAL | Age: 62
End: 2022-12-02
Payer: MEDICARE

## 2022-12-02 ENCOUNTER — TELEPHONE (OUTPATIENT)
Dept: ADMINISTRATIVE | Facility: HOSPITAL | Age: 62
End: 2022-12-02
Payer: MEDICARE

## 2022-12-02 ENCOUNTER — PATIENT OUTREACH (OUTPATIENT)
Dept: ADMINISTRATIVE | Facility: HOSPITAL | Age: 62
End: 2022-12-02
Payer: MEDICARE

## 2022-12-02 NOTE — PROGRESS NOTES
Chart review completed 2022.  Care Everywhere updates requested and reviewed.  Immunizations reconciled. Media reports reviewed.  Duplicate HM overrides and  orders removed if applies.  Overdue HM topic chart audit.  Overdue lab testing linked to upcoming lab appointments if applies.    Lab [facility] and [facility] reviewed   no HM test results found.  DIS reviewed with no Mammogram or DEXA found.      Patient portal message sent regarding overdue HM       Health Maintenance Due   Topic Date Due    Shingles Vaccine (1 of 2) Never done    Diabetes Urine Screening  2020    Mammogram  2021    COVID-19 Vaccine (3 - Booster) 10/19/2021    Foot Exam  2021    Colorectal Cancer Screening  2022    Hemoglobin A1c  2022    Eye Exam  2022

## 2022-12-06 ENCOUNTER — OFFICE VISIT (OUTPATIENT)
Dept: WOUND CARE | Facility: HOSPITAL | Age: 62
End: 2022-12-06
Attending: PODIATRIST
Payer: MEDICARE

## 2022-12-06 VITALS
RESPIRATION RATE: 18 BRPM | HEIGHT: 59 IN | TEMPERATURE: 98 F | HEART RATE: 64 BPM | DIASTOLIC BLOOD PRESSURE: 90 MMHG | WEIGHT: 130 LBS | BODY MASS INDEX: 26.21 KG/M2 | SYSTOLIC BLOOD PRESSURE: 185 MMHG

## 2022-12-06 DIAGNOSIS — L03.119 CELLULITIS AND ABSCESS OF LEG: ICD-10-CM

## 2022-12-06 DIAGNOSIS — L02.419 CELLULITIS AND ABSCESS OF LEG: ICD-10-CM

## 2022-12-06 DIAGNOSIS — Z79.4 TYPE 2 DIABETES MELLITUS WITH DIABETIC NEPHROPATHY, WITH LONG-TERM CURRENT USE OF INSULIN: ICD-10-CM

## 2022-12-06 DIAGNOSIS — E11.65 TYPE 2 DIABETES MELLITUS WITH HYPERGLYCEMIA, UNSPECIFIED WHETHER LONG TERM INSULIN USE: ICD-10-CM

## 2022-12-06 DIAGNOSIS — E11.3313 TYPE 2 DIABETES MELLITUS WITH BOTH EYES AFFECTED BY MODERATE NONPROLIFERATIVE RETINOPATHY AND MACULAR EDEMA, WITH LONG-TERM CURRENT USE OF INSULIN: ICD-10-CM

## 2022-12-06 DIAGNOSIS — L97.812 VENOUS STASIS ULCER OF OTHER PART OF RIGHT LOWER LEG WITH FAT LAYER EXPOSED, UNSPECIFIED WHETHER VARICOSE VEINS PRESENT: ICD-10-CM

## 2022-12-06 DIAGNOSIS — L97.913 NON-PRESSURE CHRONIC ULCER OF RIGHT LOWER LEG WITH NECROSIS OF MUSCLE: Primary | ICD-10-CM

## 2022-12-06 DIAGNOSIS — R60.0 BILATERAL LOWER EXTREMITY EDEMA: ICD-10-CM

## 2022-12-06 DIAGNOSIS — I83.018 VENOUS STASIS ULCER OF OTHER PART OF RIGHT LOWER LEG WITH NECROSIS OF MUSCLE, UNSPECIFIED WHETHER VARICOSE VEINS PRESENT: ICD-10-CM

## 2022-12-06 DIAGNOSIS — I83.018 VENOUS STASIS ULCER OF OTHER PART OF RIGHT LOWER LEG WITH FAT LAYER EXPOSED, UNSPECIFIED WHETHER VARICOSE VEINS PRESENT: ICD-10-CM

## 2022-12-06 DIAGNOSIS — E11.21 TYPE 2 DIABETES MELLITUS WITH DIABETIC NEPHROPATHY, WITH LONG-TERM CURRENT USE OF INSULIN: ICD-10-CM

## 2022-12-06 DIAGNOSIS — Z79.4 TYPE 2 DIABETES MELLITUS WITH BOTH EYES AFFECTED BY MODERATE NONPROLIFERATIVE RETINOPATHY AND MACULAR EDEMA, WITH LONG-TERM CURRENT USE OF INSULIN: ICD-10-CM

## 2022-12-06 DIAGNOSIS — L97.813 VENOUS STASIS ULCER OF OTHER PART OF RIGHT LOWER LEG WITH NECROSIS OF MUSCLE, UNSPECIFIED WHETHER VARICOSE VEINS PRESENT: ICD-10-CM

## 2022-12-06 DIAGNOSIS — Z91.89 AT HIGH RISK FOR INADEQUATE NUTRITIONAL INTAKE: ICD-10-CM

## 2022-12-06 DIAGNOSIS — L97.912 NON-PRESSURE CHRONIC ULCER OF RIGHT LOWER LEG WITH FAT LAYER EXPOSED: ICD-10-CM

## 2022-12-06 PROCEDURE — 87070 CULTURE OTHR SPECIMN AEROBIC: CPT | Performed by: PODIATRIST

## 2022-12-06 PROCEDURE — 99499 UNLISTED E&M SERVICE: CPT | Mod: HCNC,,, | Performed by: PODIATRIST

## 2022-12-06 PROCEDURE — 3080F PR MOST RECENT DIASTOLIC BLOOD PRESSURE >= 90 MM HG: ICD-10-PCS | Mod: CPTII,,, | Performed by: PODIATRIST

## 2022-12-06 PROCEDURE — 3051F HG A1C>EQUAL 7.0%<8.0%: CPT | Mod: CPTII,,, | Performed by: PODIATRIST

## 2022-12-06 PROCEDURE — 3077F SYST BP >= 140 MM HG: CPT | Mod: CPTII,,, | Performed by: PODIATRIST

## 2022-12-06 PROCEDURE — 99204 OFFICE O/P NEW MOD 45 MIN: CPT | Mod: 25,,, | Performed by: PODIATRIST

## 2022-12-06 PROCEDURE — 1160F PR REVIEW ALL MEDS BY PRESCRIBER/CLIN PHARMACIST DOCUMENTED: ICD-10-PCS | Mod: CPTII,,, | Performed by: PODIATRIST

## 2022-12-06 PROCEDURE — 1160F RVW MEDS BY RX/DR IN RCRD: CPT | Mod: CPTII,,, | Performed by: PODIATRIST

## 2022-12-06 PROCEDURE — 4010F PR ACE/ARB THEARPY RXD/TAKEN: ICD-10-PCS | Mod: CPTII,,, | Performed by: PODIATRIST

## 2022-12-06 PROCEDURE — 11043 DBRDMT MUSC&/FSCA 1ST 20/<: CPT | Mod: ,,, | Performed by: PODIATRIST

## 2022-12-06 PROCEDURE — 99213 OFFICE O/P EST LOW 20 MIN: CPT | Mod: 25 | Performed by: PODIATRIST

## 2022-12-06 PROCEDURE — 99499 RISK ADDL DX/OHS AUDIT: ICD-10-PCS | Mod: HCNC,,, | Performed by: PODIATRIST

## 2022-12-06 PROCEDURE — 11046 DBRDMT MUSC&/FSCA EA ADDL: CPT | Mod: ,,, | Performed by: PODIATRIST

## 2022-12-06 PROCEDURE — 3051F PR MOST RECENT HEMOGLOBIN A1C LEVEL 7.0 - < 8.0%: ICD-10-PCS | Mod: CPTII,,, | Performed by: PODIATRIST

## 2022-12-06 PROCEDURE — 3008F PR BODY MASS INDEX (BMI) DOCUMENTED: ICD-10-PCS | Mod: CPTII,,, | Performed by: PODIATRIST

## 2022-12-06 PROCEDURE — 87075 CULTR BACTERIA EXCEPT BLOOD: CPT | Performed by: PODIATRIST

## 2022-12-06 PROCEDURE — 99204 PR OFFICE/OUTPT VISIT, NEW, LEVL IV, 45-59 MIN: ICD-10-PCS | Mod: 25,,, | Performed by: PODIATRIST

## 2022-12-06 PROCEDURE — 11046 PR DEB MUSC/FASCIA ADD-ON: ICD-10-PCS | Mod: ,,, | Performed by: PODIATRIST

## 2022-12-06 PROCEDURE — 3080F DIAST BP >= 90 MM HG: CPT | Mod: CPTII,,, | Performed by: PODIATRIST

## 2022-12-06 PROCEDURE — 3008F BODY MASS INDEX DOCD: CPT | Mod: CPTII,,, | Performed by: PODIATRIST

## 2022-12-06 PROCEDURE — 11046 DBRDMT MUSC&/FSCA EA ADDL: CPT | Performed by: PODIATRIST

## 2022-12-06 PROCEDURE — 1159F MED LIST DOCD IN RCRD: CPT | Mod: CPTII,,, | Performed by: PODIATRIST

## 2022-12-06 PROCEDURE — 11043 DBRDMT MUSC&/FSCA 1ST 20/<: CPT | Mod: GZ | Performed by: PODIATRIST

## 2022-12-06 PROCEDURE — 11043 PR DEBRIDEMENT, SKIN, SUB-Q TISSUE,MUSCLE,=<20 SQ CM: ICD-10-PCS | Mod: ,,, | Performed by: PODIATRIST

## 2022-12-06 PROCEDURE — 29580 STRAPPING UNNA BOOT: CPT | Performed by: PODIATRIST

## 2022-12-06 PROCEDURE — 4010F ACE/ARB THERAPY RXD/TAKEN: CPT | Mod: CPTII,,, | Performed by: PODIATRIST

## 2022-12-06 PROCEDURE — 1159F PR MEDICATION LIST DOCUMENTED IN MEDICAL RECORD: ICD-10-PCS | Mod: CPTII,,, | Performed by: PODIATRIST

## 2022-12-06 PROCEDURE — 3077F PR MOST RECENT SYSTOLIC BLOOD PRESSURE >= 140 MM HG: ICD-10-PCS | Mod: CPTII,,, | Performed by: PODIATRIST

## 2022-12-06 RX ORDER — DOXYCYCLINE 100 MG/1
100 CAPSULE ORAL 2 TIMES DAILY
Qty: 28 CAPSULE | Refills: 0 | Status: SHIPPED | OUTPATIENT
Start: 2022-12-06 | End: 2022-12-20

## 2022-12-06 NOTE — PROGRESS NOTES
[address]  [place] [address]  Phone: [phone]   [phone]    Patient's PCP:Cristina Ren MD  Referring Provider: Aaareferral Self    Subjective:      Chief Complaint:: Venous Stasis, Venous Ulcer, Wound Care, Non-healing Wound, Wound Check, Leg Swelling, Wound Infection, Diabetes, Pressure Ulcer, and Wound Consult    HPI    Steff Johnson is a 62 y.o. female who presents today with a complaint of right lower leg venous ulcers lasting for approximately 1 week.  See Wound docs for full assessment evaluation all right lower leg venous ulcers present.    1. Debridement See Wound Docs for assessment of wounds and procedure notes  2. Continue taking all medications as prescribed  3. Continue home health dressing changes  4. RTC one week   5. Counseled patient on increasing protein intake, not getting wound wet, keeping dressing clean dry and intact, following a healthy diet, elevating legs when able, removing pressure from wound    Total time spent for E&M 35  Total time for debridement 35 minutes       Culture taken of wound.  I will adjust antibiotics accordingly once the results of the culture return    Counseling/Education:  I provided patient education verbally regarding:   The aspects of diabetes and how it pertains to the feet. I explained the importance of proper diabetic foot care and how it is essential for the health of their feet.    I discussed the importance of knowing their Hemoglobin A1c and that the level needs to be as close to 6 as possible. I discussed the increase complications of high blood sugar including stroke, blindness, heart attack, kidney failure and loss of limb secondary to neuropathy and PVD.     With neuropathy, beware of any breaks in the skin or redness. These areas are not recognized early due to the numbness.    I discussed Diabetes, lower back issues, metabolic disorders, systemic causes, chemotherapy, vitamin deficiency, heavy metal exposure, as  some of the causes. I also explained that as much as 40% of the time we can not find a cause. I discussed different treatments available to control the symptoms but which may not cure the problem.       Counseled patient on the aspects of diabetes and how it pertains to the feet.  I explained the importance of proper diabetic foot care and how it is essential for the health of their feet.      Shoe inspection. Patient instructed on proper foot hygeine. We discussed wearing proper shoe gear, daily foot inspections, never walking without protective shoe gear, never putting sharp instruments to feet, routine podiatric nail visits every 2-3 months.        I counseled the patient on their conditions, their implications and medical management.     >50% of this > 60 minute visit was spent face to face educating/counseling the patient    I spent a total of 60 minutes on the day of the visit.This includes face to face time and non-face to face time preparing to see the patient (eg, review of tests), obtaining and/or reviewing separately obtained history, documenting clinical information in the electronic or other health record, independently interpreting results and communicating results to the patient/family/caregiver, or care coordinator.       Patient should call the office immediately if any signs of infection, such as fever, chills, sweats, increased redness or pain.    Patient was instructed to call the clinic or go to the emergency department if their symptoms do not improve, worsens, or if new symptoms develop.  Patient was advised that if any increased swelling, pain, or numbness arise to go immediately to the ED. Patient knows to call any time if an emergency arises. Shared decision making occurred and patient verbalized understanding in agreement with this plan.     Much of the documentation for this visit was completed in the [facility] system.  Please see the attached documentation for further details about the  "patient's care. Scanned under the Media tab.        Dorys Crespo DPM       Vitals:    12/06/22 1234   BP: (!) 185/90   Pulse: 64   Resp: 18   Temp: 98.2 °F (36.8 °C)   TempSrc: Temporal   Weight: 59 kg (130 lb)   Height: 4' 11" (1.499 m)   PainSc: 0-No pain   PainLoc: Leg      Shoe Size:     Past Surgical History:   Procedure Laterality Date    COLONOSCOPY N/A 4/11/2017    Procedure: COLONOSCOPY;  Surgeon: Jared Antonio MD;  Location: [facility];  Service: Endoscopy;  Laterality: N/A;    HYSTERECTOMY      OOPHORECTOMY      SHOULDER SURGERY      R     Past Medical History:   Diagnosis Date    Arthritis     Complete tear of left rotator cuff 11/09/2017    COPD (chronic obstructive pulmonary disease)     COVID-19 infection 07/02/2021    Diabetes mellitus     H/o Cerebrovascular accident (CVA) of left pontine structure 12/12/2019    Hypertension     Personal history of colonic polyps     Stroke     Wears glasses      Family History   Problem Relation Age of Onset    Diabetes Mother     Asthma Mother     Hypertension Mother     Diabetes Father     Diabetes Brother     Hypertension Brother     Anemia Daughter     Glaucoma Neg Hx     Macular degeneration Neg Hx     Retinal detachment Neg Hx         Social History:   Marital Status: Single  Alcohol History:  reports no history of alcohol use.  Tobacco History:  reports that she has never smoked. She has never used smokeless tobacco.  Drug History:  reports no history of drug use.    Review of patient's allergies indicates:  No Known Allergies    Current Outpatient Medications   Medication Sig Dispense Refill    atorvastatin (LIPITOR) 40 MG tablet Take 1 tablet (40 mg total) by mouth once daily. 90 tablet 1    blood glucose control, low Soln Use as directed (Patient taking differently: 1 each by Misc.(Non-Drug; Combo Route) route. Use as directed) 1 each 1    blood sugar diagnostic (TRUE METRIX GLUCOSE TEST STRIP) Strp Test blood sugar twice daily 200 strip 3    " "cloNIDine 0.1 mg/24 hr td ptwk (CATAPRES) 0.1 mg/24 hr Place 1 patch onto the skin every 7 days. 12 patch 1    clopidogreL (PLAVIX) 75 mg tablet Take 1 tablet (75 mg total) by mouth once daily. 90 tablet 1    doxycycline (MONODOX) 100 MG capsule Take 1 capsule (100 mg total) by mouth 2 (two) times daily. for 14 days 28 capsule 0    ergocalciferol (ERGOCALCIFEROL) 50,000 unit Cap Take 1 capsule (50,000 Units total) by mouth every 7 days. 12 capsule 1    hydrALAZINE (APRESOLINE) 100 MG tablet Take 1 tablet (100 mg total) by mouth 3 (three) times daily. 270 tablet 1    HYDROcodone-acetaminophen (NORCO)  mg per tablet       insulin aspart U-100 (NOVOLOG FLEXPEN U-100 INSULIN) 100 unit/mL (3 mL) InPn pen Inject 14 Units subcutaneously into the skin 3 (three) times daily before meals. 45 mL 3    insulin degludec (TRESIBA FLEXTOUCH U-100) 100 unit/mL (3 mL) insulin pen ADMINISTER 40 UNITS UNDER THE SKIN EVERY EVENING 12 pen 1    lancets Misc 1 lancet by Misc.(Non-Drug; Combo Route) route 2 (two) times a day. To check BG 2 times daily, to use with insurance preferred meter 200 each 3    metoprolol succinate (TOPROL-XL) 100 MG 24 hr tablet Take 1 tablet (100 mg total) by mouth once daily. 90 tablet 1    pen needle, diabetic (BD ULTRA-FINE SHORT PEN NEEDLE) 31 gauge x 5/16" Ndle 1 pen by Misc.(Non-Drug; Combo Route) route 4 (four) times daily. 300 each 5    silver sulfADIAZINE 1% (SILVADENE) 1 % cream Apply topically 2 (two) times daily. for 10 days 50 g 0    tiotropium (SPIRIVA) 18 mcg inhalation capsule Inhale 1 capsule (18 mcg total) into the lungs once daily. Controller 90 capsule 3    valsartan-hydrochlorothiazide (DIOVAN-HCT) 160-25 mg per tablet Take 1 tablet by mouth once daily. 90 tablet 1     No current facility-administered medications for this visit.       Review of Systems   Constitutional:  Negative for chills, fatigue, fever and unexpected weight change.   HENT:  Negative for hearing loss and trouble " swallowing.    Eyes:  Negative for photophobia and visual disturbance.   Respiratory:  Negative for cough, shortness of breath and wheezing.    Cardiovascular:  Positive for leg swelling. Negative for chest pain and palpitations.   Gastrointestinal:  Negative for abdominal pain and nausea.   Genitourinary:  Negative for dysuria and frequency.   Musculoskeletal:  Negative for arthralgias, back pain, gait problem, joint swelling and myalgias.   Skin:  Positive for wound. Negative for rash.   Neurological:  Negative for tremors, seizures, weakness, numbness and headaches.   Hematological:  Does not bruise/bleed easily.       Objective:        Physical Exam:   Foot Exam  Physical Exam  Ortho/SPM Exam     Imaging:            Assessment:       1. 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 Cellulitis and abscess of leg    3. At high risk for inadequate nutritional intake    4. Bilateral lower extremity edema    5. Venous stasis ulcer of other part of right lower leg with necrosis of muscle, unspecified whether varicose veins present    6. Venous stasis ulcer of other part of right lower leg with fat layer exposed, unspecified whether varicose veins present    7. Non-pressure chronic ulcer of right lower leg with fat layer exposed    8. Type 2 diabetes mellitus with hyperglycemia, unspecified whether long term insulin use    9. Type 2 diabetes mellitus with diabetic nephropathy, with long-term current use of insulin    10. Type 2 diabetes mellitus with both eyes affected by moderate nonproliferative retinopathy and macular edema, with long-term current use of insulin      Plan:   Non-pressure chronic ulcer of right lower leg with necrosis of muscle  -     Culture, Anaerobic  -     CULTURE, AEROBIC  (SPECIFY SOURCE)    Cellulitis and abscess of leg  -     Culture, Anaerobic  -     CULTURE, AEROBIC  (SPECIFY SOURCE)    At high risk for inadequate nutritional intake    Bilateral lower extremity  edema    Venous stasis ulcer of other part of right lower leg with necrosis of muscle, unspecified whether varicose veins present    Venous stasis ulcer of other part of right lower leg with fat layer exposed, unspecified whether varicose veins present    Non-pressure chronic ulcer of right lower leg with fat layer exposed    Type 2 diabetes mellitus with hyperglycemia, unspecified whether long term insulin use    Type 2 diabetes mellitus with diabetic nephropathy, with long-term current use of insulin    Type 2 diabetes mellitus with both eyes affected by moderate nonproliferative retinopathy and macular edema, with long-term current use of insulin    Other orders  -     doxycycline (MONODOX) 100 MG capsule; Take 1 capsule (100 mg total) by mouth 2 (two) times daily. for 14 days  Dispense: 28 capsule; Refill: 0    Follow up in about 1 week (around 12/13/2022).    Procedures          Counseling:     I provided patient education verbally regarding:   Patient diagnosis, treatment options, as well as alternatives, risks, and benefits.     This note was created using Dragon voice recognition software that occasionally misinterpreted phrases or words.

## 2022-12-07 ENCOUNTER — TELEPHONE (OUTPATIENT)
Dept: FAMILY MEDICINE | Facility: CLINIC | Age: 62
End: 2022-12-07

## 2022-12-07 ENCOUNTER — OFFICE VISIT (OUTPATIENT)
Dept: FAMILY MEDICINE | Facility: CLINIC | Age: 62
End: 2022-12-07
Payer: MEDICARE

## 2022-12-07 VITALS
OXYGEN SATURATION: 98 % | BODY MASS INDEX: 26.98 KG/M2 | HEIGHT: 59 IN | TEMPERATURE: 98 F | SYSTOLIC BLOOD PRESSURE: 150 MMHG | DIASTOLIC BLOOD PRESSURE: 90 MMHG | WEIGHT: 133.81 LBS | HEART RATE: 67 BPM

## 2022-12-07 DIAGNOSIS — E78.2 MIXED HYPERLIPIDEMIA: ICD-10-CM

## 2022-12-07 DIAGNOSIS — M25.511 ACUTE PAIN OF RIGHT SHOULDER: ICD-10-CM

## 2022-12-07 DIAGNOSIS — I15.2 HYPERTENSION ASSOCIATED WITH DIABETES: Primary | ICD-10-CM

## 2022-12-07 DIAGNOSIS — Z79.4 TYPE 2 DIABETES MELLITUS WITH DIABETIC NEPHROPATHY, WITH LONG-TERM CURRENT USE OF INSULIN: ICD-10-CM

## 2022-12-07 DIAGNOSIS — E66.3 OVERWEIGHT (BMI 25.0-29.9): ICD-10-CM

## 2022-12-07 DIAGNOSIS — I63.9 CEREBROVASCULAR ACCIDENT (CVA) OF LEFT PONTINE STRUCTURE: ICD-10-CM

## 2022-12-07 DIAGNOSIS — E11.21 TYPE 2 DIABETES MELLITUS WITH DIABETIC NEPHROPATHY, WITH LONG-TERM CURRENT USE OF INSULIN: ICD-10-CM

## 2022-12-07 DIAGNOSIS — E11.59 HYPERTENSION ASSOCIATED WITH DIABETES: Primary | ICD-10-CM

## 2022-12-07 DIAGNOSIS — J44.9 CHRONIC OBSTRUCTIVE PULMONARY DISEASE, UNSPECIFIED COPD TYPE: ICD-10-CM

## 2022-12-07 PROCEDURE — 99999 PR PBB SHADOW E&M-EST. PATIENT-LVL V: ICD-10-PCS | Mod: PBBFAC,,, | Performed by: NURSE PRACTITIONER

## 2022-12-07 PROCEDURE — 3008F PR BODY MASS INDEX (BMI) DOCUMENTED: ICD-10-PCS | Mod: CPTII,S$GLB,, | Performed by: NURSE PRACTITIONER

## 2022-12-07 PROCEDURE — 1160F PR REVIEW ALL MEDS BY PRESCRIBER/CLIN PHARMACIST DOCUMENTED: ICD-10-PCS | Mod: CPTII,S$GLB,, | Performed by: NURSE PRACTITIONER

## 2022-12-07 PROCEDURE — 99214 PR OFFICE/OUTPT VISIT, EST, LEVL IV, 30-39 MIN: ICD-10-PCS | Mod: S$GLB,,, | Performed by: NURSE PRACTITIONER

## 2022-12-07 PROCEDURE — 3080F PR MOST RECENT DIASTOLIC BLOOD PRESSURE >= 90 MM HG: ICD-10-PCS | Mod: CPTII,S$GLB,, | Performed by: NURSE PRACTITIONER

## 2022-12-07 PROCEDURE — 1159F PR MEDICATION LIST DOCUMENTED IN MEDICAL RECORD: ICD-10-PCS | Mod: CPTII,S$GLB,, | Performed by: NURSE PRACTITIONER

## 2022-12-07 PROCEDURE — 3077F SYST BP >= 140 MM HG: CPT | Mod: CPTII,S$GLB,, | Performed by: NURSE PRACTITIONER

## 2022-12-07 PROCEDURE — 99214 OFFICE O/P EST MOD 30 MIN: CPT | Mod: S$GLB,,, | Performed by: NURSE PRACTITIONER

## 2022-12-07 PROCEDURE — 3080F DIAST BP >= 90 MM HG: CPT | Mod: CPTII,S$GLB,, | Performed by: NURSE PRACTITIONER

## 2022-12-07 PROCEDURE — 3051F HG A1C>EQUAL 7.0%<8.0%: CPT | Mod: CPTII,S$GLB,, | Performed by: NURSE PRACTITIONER

## 2022-12-07 PROCEDURE — 1159F MED LIST DOCD IN RCRD: CPT | Mod: CPTII,S$GLB,, | Performed by: NURSE PRACTITIONER

## 2022-12-07 PROCEDURE — 1160F RVW MEDS BY RX/DR IN RCRD: CPT | Mod: CPTII,S$GLB,, | Performed by: NURSE PRACTITIONER

## 2022-12-07 PROCEDURE — 4010F PR ACE/ARB THEARPY RXD/TAKEN: ICD-10-PCS | Mod: CPTII,S$GLB,, | Performed by: NURSE PRACTITIONER

## 2022-12-07 PROCEDURE — 4010F ACE/ARB THERAPY RXD/TAKEN: CPT | Mod: CPTII,S$GLB,, | Performed by: NURSE PRACTITIONER

## 2022-12-07 PROCEDURE — 99999 PR PBB SHADOW E&M-EST. PATIENT-LVL V: CPT | Mod: PBBFAC,,, | Performed by: NURSE PRACTITIONER

## 2022-12-07 PROCEDURE — 3008F BODY MASS INDEX DOCD: CPT | Mod: CPTII,S$GLB,, | Performed by: NURSE PRACTITIONER

## 2022-12-07 PROCEDURE — 3051F PR MOST RECENT HEMOGLOBIN A1C LEVEL 7.0 - < 8.0%: ICD-10-PCS | Mod: CPTII,S$GLB,, | Performed by: NURSE PRACTITIONER

## 2022-12-07 PROCEDURE — 3077F PR MOST RECENT SYSTOLIC BLOOD PRESSURE >= 140 MM HG: ICD-10-PCS | Mod: CPTII,S$GLB,, | Performed by: NURSE PRACTITIONER

## 2022-12-07 RX ORDER — HYDROCODONE BITARTRATE AND ACETAMINOPHEN 5; 325 MG/1; MG/1
1 TABLET ORAL EVERY 6 HOURS PRN
Qty: 28 TABLET | Refills: 0 | Status: SHIPPED | OUTPATIENT
Start: 2022-12-07 | End: 2022-12-14

## 2022-12-07 NOTE — PATIENT INSTRUCTIONS
Kishan Artis,     If you are due for any health screening(s) below please notify me so we can arrange them to be ordered and scheduled to maintain your health. Most healthy patients complete it. Don't lose out on improving your health.     Tests to Keep You Healthy    Mammogram: ORDERED BUT NOT SCHEDULED  Eye Exam: SCHEDULED  Colon Cancer Screening: DUE  Last Blood Pressure <= 139/89 (12/7/2022): NO  Last HbA1c < 8 (05/18/2022): Yes      Breast Cancer Screening    Breast cancer is the second most common cancer in women after skin cancer, and the second leading cause of death from cancer after lung cancer. Mammograms can detect breast cancer early, which significantly increases the chances of curing the cancer.      A screening mammogram is an x-ray image of the breasts used for early breast cancer detection. It can help reduce the number of deaths from breast cancer among women. To get a clear image, the breast is placed between two plastic plates to make it flat. How often a mammogram is needed depends on your age and your breast cancer risk.    Although you are still overdue for this important screening, due to the COVID-19 pandemic, we recommend scheduling it in the near future.  Colon Cancer Screening    Of cancers affecting both men and women, colorectal cancer is the third leading cancer killer in the [place]. But it doesnt have to be. Screening can prevent colorectal cancer or find it at an early stage when treatment often leads to a cure.    A colonoscopy is the preferred test for detecting colon cancer. It is needed only once every 10 years if results are negative. While sedated, a flexible, lighted tube with a tiny camera is inserted into the rectum and advanced through the colon to look for cancers. An alternative screening test that is used at home and returned to the lab may also be used. It detects hidden blood in bowel movements which could indicate cancer in the colon. If results are positive,  you will need a colonoscopy to determine if the blood is a sign of cancer. This type of follow up (diagnostic) colonoscopy usually requires additional copays as required by your insurance provider. Please contact your PCP if you have any questions.    Although you are still overdue for this important screening, due to the COVID-19 pandemic, we recommend scheduling it in the near future.       Diabetic Retinal Eye Exam    Diabetes is the #1 cause of blindness in the [place] - early detection before signs or symptoms develop can prevent debilitating blindness.    Once-a-year screening is recommended for all diabetic patients. This exam can prevent and treat diabetes complications in the eye before developing symptoms. This can be done with a special camera is used to take photographs of the back of your eye without having to dilate them, or you can see an eye doctor for a full dilated exam.    Although you are still overdue for this important screening, due to the COVID-19 pandemic, we recommend scheduling it in the near future.  Diabetic A1c Testing    Your chart identifies you as having diabetes. It is recommended that all diabetes patients have an A1c test done at least once a year, but [facility] recommends twice a year for most patients. This helps your doctor to better help you manage your diabetes. This is a non-fasting lab test which can be completed at any time. Your result will be sent to your Primary Care Provider for review and that office will contact you with the results.      Patient Education       Checking Your Blood Pressure at Home   The Basics   Written by the doctors and editors at [facility]   How is blood pressure measured? -- Blood pressure is usually measured with a device that goes around your upper arm. This is often done in a doctor's office. But some people also check their blood pressure themselves, at home or at work.  Blood pressure is explained with 2 numbers. For instance, your  "blood pressure might be "140 over 90." The first (top) number is the pressure inside your arteries when your heart is mercy. The second (bottom) number is the pressure inside your arteries when your heart is relaxed. The table shows how doctors and nurses define high and normal blood pressure (table 1).  If your blood pressure gets too high, it puts you at risk for heart attack, stroke, and kidney disease. High blood pressure does not usually cause symptoms. But it can be serious.  What is a home blood pressure meter? -- A home blood pressure meter (or "monitor") is a device you can use to check your blood pressure yourself. It has a cuff that goes around your upper arm (figure 1). Some devices have a cuff that goes around your wrist instead. But doctors aren't sure if these work as well. The meter also has a small screen, or dial, that shows your blood pressure numbers.  There are also special meters you can wear for a day or 2. These are different because they automatically check your blood pressure throughout the day and night, even while you are sleeping. If your doctor thinks you should use one of these devices, they will talk to you about how to wear it.  Why do I need to check my blood pressure at home? -- If your doctor knows or suspects that you have high blood pressure, they might want you to check it at home. There are a few reasons for this. Your doctor might want to look at:  Whether your blood pressure measures the same at home as it did in the doctor's office  How well your blood pressure medicines are working  Changes in your blood pressure, for example, if it goes up and down  People who check their own blood pressure at home usually do better at keeping it low.  How do I choose a home blood pressure meter? -- When choosing a home blood pressure meter, you will probably want to think about:  Cost - Some devices cost more than others. You should also check to see if your insurance will help pay " for your device.  Size - It's important to make sure the cuff fits your arm comfortably. Your doctor or nurse can help you with this.  How easy it is to use - You should make sure you understand how to use the device. You also need to be able to read the numbers on the screen.  You do not need a prescription to buy a home blood pressure meter. You can buy them at most pharmacies or over the internet. Your doctor or nurse can help you choose the right device for you.  How do I check my blood pressure at home? -- Once you have a home blood pressure meter, your doctor or nurse should check it to make sure it fits you and works correctly.  When it's time to check your blood pressure:  Go to the bathroom and empty your bladder first. Having a full bladder can temporarily increase your blood pressure, making the results inaccurate.  Sit in a chair with your feet flat on the ground.  Try to breathe normally and stay calm.  Attach the cuff to your arm. Place the cuff directly on your skin, not over your clothing. The cuff should be tight enough to not slip down, but not uncomfortably tight.  Sit and relax for about 3 to 5 minutes with the cuff on.  Follow the directions that came with your device to start measuring your blood pressure. This might involve squeezing the bulb at the end of the tube to inflate the cuff (fill it with air). With some monitors, you just need to press a button to inflate the cuff. When the cuff fills with air, it feels like someone is squeezing your arm, but it should not hurt. Then you will slowly deflate the cuff (let the air out of it), or it will deflate by itself. The screen or dial will show your blood pressure numbers.  Stay seated and relax for 1 minute, then measure your blood pressure again.  How often should I check my blood pressure? -- It depends. Different people need to follow different schedules. Your doctor or nurse will tell you how often to check your blood pressure, and when.  Some people need to check their blood pressure twice a day, in the morning and evening.  Your doctor or nurse will probably tell you to keep track of your blood pressure for at least a few days (table 2). Then they will look at the numbers. The reason for this is that it's normal for your blood pressure to change a bit from day to day. For example, the numbers might change depending on whether you recently had caffeine, just exercised, or feel stressed. Checking your blood pressure over several days - or longer - will give your doctor or nurse a better idea of what is average for you.  How should I keep track of my blood pressure? -- Some blood pressure meters will record your numbers for you, or send them to your computer or smartphone. If yours does not do this, you will need to write them down. Your doctor or nurse can help you figure out the best way to keep track of the numbers.  What if my blood pressure is high? -- Your doctor or nurse will tell you what to do if your blood pressure is high when you check it at home. If you get a number that is higher than normal, measure it again to see if it is still high. If it is very high (above a certain number, which your doctor or nurse will tell you to watch out for), you should call your doctor right away.  If your blood pressure is only a little high, your doctor or nurse might tell you to keep checking it for a few more days or weeks, and then call if it does not go back down. Then they can help you decide what to do next.  All topics are updated as new evidence becomes available and our peer review process is complete.  This topic retrieved from [employer] on: Sep 21, 2021.  Topic 774352 Version 4.0  Release: 29.4.2 - C29.263  © 2021 UpToDate, Inc. and/or its affiliates. All rights reserved.  table 1: Definition of normal and high blood pressure  Level  Top number  Bottom number    High 130 or above 80 or above   Elevated 120 to 129 79 or below   Normal 119 or  "below 79 or below   These definitions are from the American College of Cardiology/American Heart Association. Other expert groups might use slightly different definitions.  "Elevated blood pressure" is a term doctor or nurses use as a warning. It means you do not yet have high blood pressure, but your blood pressure is not as low as it should be for good health.  Graphic 02342 Version 6.0  figure 1: Using a home blood pressure meter     This is an example of a person using a home blood pressure meter.  Graphic 231480 Version 1.0    table 2: 7-day diary for checking blood pressure at home  Day 1  Day 2  Day 3  Day 4  Day 5  Day 6  Day 7    Morning  1st read Morning  1st read Morning  1st read Morning  1st read Morning  1st read Morning  1st read Morning  1st read   Systolic: __________ Systolic: __________ Systolic: __________ Systolic: __________ Systolic: __________ Systolic: __________ Systolic: __________   Diastolic: __________ Diastolic: __________ Diastolic: __________ Diastolic: __________ Diastolic: __________ Diastolic: __________ Diastolic: __________   Pulse: __________ Pulse: __________ Pulse: __________ Pulse: __________ Pulse: __________ Pulse: __________ Pulse: __________   Morning  2nd read Morning  2nd read Morning  2nd read Morning  2nd read Morning  2nd read Morning  2nd read Morning  2nd read   Systolic: __________ Systolic: __________ Systolic: __________ Systolic: __________ Systolic: __________ Systolic: __________ Systolic: __________   Diastolic: __________ Diastolic: __________ Diastolic: __________ Diastolic: __________ Diastolic: __________ Diastolic: __________ Diastolic: __________   Pulse: __________ Pulse: __________ Pulse: __________ Pulse: __________ Pulse: __________ Pulse: __________ Pulse: __________   Evening  1st read Evening  1st read Evening  1st read Evening  1st read Evening  1st read Evening  1st read Evening  1st read   Systolic: __________ Systolic: __________ Systolic: " __________ Systolic: __________ Systolic: __________ Systolic: __________ Systolic: __________   Diastolic: __________ Diastolic: __________ Diastolic: __________ Diastolic: __________ Diastolic: __________ Diastolic: __________ Diastolic: __________   Pulse: __________ Pulse: __________ Pulse: __________ Pulse: __________ Pulse: __________ Pulse: __________ Pulse: __________   Evening  2nd read Evening  2nd read Evening  2nd read Evening  2nd read Evening  2nd read Evening  2nd read Evening  2nd read   Systolic: __________ Systolic: __________ Systolic: __________ Systolic: __________ Systolic: __________ Systolic: __________ Systolic: __________   Diastolic: __________ Diastolic: __________ Diastolic: __________ Diastolic: __________ Diastolic: __________ Diastolic: __________ Diastolic: __________   Pulse: __________ Pulse: __________ Pulse: __________ Pulse: __________ Pulse: __________ Pulse: __________ Pulse: __________   Notes    Notes    Notes    Notes    Notes    Notes    Notes      ____________________ ____________________ ____________________ ____________________ ____________________ ____________________ ____________________   ____________________ ____________________ ____________________ ____________________ ____________________ ____________________ ____________________   ____________________ ____________________ ____________________ ____________________ ____________________ ____________________ ____________________   Patient name: ______________________________     Patient ID: ________________________________    Primary care provider: _______________________    Average BP: _______________________________    Graphic [id number] Version 1.0  Consumer Information Use and Disclaimer   This information is not specific medical advice and does not replace information you receive from your health care provider. This is only a brief summary of general information. It does NOT include all information about conditions,  illnesses, injuries, tests, procedures, treatments, therapies, discharge instructions or life-style choices that may apply to you. You must talk with your health care provider for complete information about your health and treatment options. This information should not be used to decide whether or not to accept your health care provider's advice, instructions or recommendations. Only your health care provider has the knowledge and training to provide advice that is right for you. The use of this information is governed by the [employer] End User License Agreement, available at [email or web address]://[email or web address].[place]/en/[email or web address]/[email or web address].The use of [employer] content is governed by the [employer] Terms of Use. ©2021 [employer] All rights reserved.  Copyright   © 2021 UpToDate, Inc. and/or its affiliates. All rights reserved.

## 2022-12-07 NOTE — PROGRESS NOTES
Subjective:       Patient ID: Steff Johnson is a 62 y.o. female.    Chief Complaint: Follow-up    HPI    Patient presents today with  for chronic conditions follow up. Last visit with  on 11/9/22. Labs reviewed A1c from 5/22 A1c 7.2 from 12.7. Will have eye exam later this month.    12/6/22 Wound-Dr. Barby Crespo: non-healing right lower leg venous ulcers-Debridement    8/3/22 Orthopedics-[facility]: Acute pain of right should-PT/OT    3/24/22 [place]: acute kidney failure    2/25/22 Neurology-: CVA    1/5/22 Hospital stay for Intracranial hemorrhage-stroke -on plavix    Pain Medicine-Pranav Costello in [place]-Norco-last dose 7/9/22  Past Medical History:   Diagnosis Date    Arthritis     Complete tear of left rotator cuff 11/09/2017    COPD (chronic obstructive pulmonary disease)     COVID-19 infection 07/02/2021    Diabetes mellitus     H/o Cerebrovascular accident (CVA) of left pontine structure 12/12/2019    Hypertension     Personal history of colonic polyps     Stroke     Wears glasses        Review of patient's allergies indicates:  No Known Allergies      Current Outpatient Medications:     atorvastatin (LIPITOR) 40 MG tablet, Take 1 tablet (40 mg total) by mouth once daily., Disp: 90 tablet, Rfl: 1    blood glucose control, low Soln, Use as directed (Patient taking differently: 1 each by Misc.(Non-Drug; Combo Route) route. Use as directed), Disp: 1 each, Rfl: 1    blood sugar diagnostic (TRUE METRIX GLUCOSE TEST STRIP) Strp, Test blood sugar twice daily, Disp: 200 strip, Rfl: 3    cloNIDine 0.1 mg/24 hr td ptwk (CATAPRES) 0.1 mg/24 hr, Place 1 patch onto the skin every 7 days., Disp: 12 patch, Rfl: 1    clopidogreL (PLAVIX) 75 mg tablet, Take 1 tablet (75 mg total) by mouth once daily., Disp: 90 tablet, Rfl: 1    doxycycline (MONODOX) 100 MG capsule, Take 1 capsule (100 mg total) by mouth 2 (two) times daily. for 14 days, Disp: 28 capsule, Rfl: 0    ergocalciferol (ERGOCALCIFEROL)  "50,000 unit Cap, Take 1 capsule (50,000 Units total) by mouth every 7 days., Disp: 12 capsule, Rfl: 1    hydrALAZINE (APRESOLINE) 100 MG tablet, Take 1 tablet (100 mg total) by mouth 3 (three) times daily., Disp: 270 tablet, Rfl: 1    HYDROcodone-acetaminophen (NORCO)  mg per tablet, , Disp: , Rfl:     insulin aspart U-100 (NOVOLOG FLEXPEN U-100 INSULIN) 100 unit/mL (3 mL) InPn pen, Inject 14 Units subcutaneously into the skin 3 (three) times daily before meals., Disp: 45 mL, Rfl: 3    insulin degludec (TRESIBA FLEXTOUCH U-100) 100 unit/mL (3 mL) insulin pen, ADMINISTER 40 UNITS UNDER THE SKIN EVERY EVENING, Disp: 12 pen, Rfl: 1    lancets Misc, 1 lancet by Misc.(Non-Drug; Combo Route) route 2 (two) times a day. To check BG 2 times daily, to use with insurance preferred meter, Disp: 200 each, Rfl: 3    metoprolol succinate (TOPROL-XL) 100 MG 24 hr tablet, Take 1 tablet (100 mg total) by mouth once daily., Disp: 90 tablet, Rfl: 1    pen needle, diabetic (BD ULTRA-FINE SHORT PEN NEEDLE) 31 gauge x 5/16" Ndle, 1 pen by Misc.(Non-Drug; Combo Route) route 4 (four) times daily., Disp: 300 each, Rfl: 5    silver sulfADIAZINE 1% (SILVADENE) 1 % cream, Apply topically 2 (two) times daily. for 10 days, Disp: 50 g, Rfl: 0    tiotropium (SPIRIVA) 18 mcg inhalation capsule, Inhale 1 capsule (18 mcg total) into the lungs once daily. Controller, Disp: 90 capsule, Rfl: 3    valsartan-hydrochlorothiazide (DIOVAN-HCT) 160-25 mg per tablet, Take 1 tablet by mouth once daily., Disp: 90 tablet, Rfl: 1    HYDROcodone-acetaminophen (NORCO) 5-325 mg per tablet, Take 1 tablet by mouth every 6 (six) hours as needed for Pain., Disp: 28 tablet, Rfl: 0    Review of Systems   Constitutional:  Negative for unexpected weight change.   HENT:  Negative for trouble swallowing.    Eyes:  Negative for visual disturbance.   Respiratory:  Negative for shortness of breath.    Cardiovascular:  Negative for chest pain, palpitations and leg swelling. " "  Gastrointestinal:  Negative for blood in stool.   Genitourinary:  Negative for hematuria.   Skin:  Negative for rash.   Allergic/Immunologic: Negative for immunocompromised state.   Neurological:  Negative for headaches.   Hematological:  Does not bruise/bleed easily.   Psychiatric/Behavioral:  Negative for agitation. The patient is not nervous/anxious.      Objective:      BP (!) 150/90   Pulse 67   Temp 97.9 °F (36.6 °C) (Oral)   Ht 4' 11" (1.499 m)   Wt 60.7 kg (133 lb 13.1 oz)   SpO2 98%   BMI 27.03 kg/m²   Physical Exam  Constitutional:       Appearance: She is well-developed.   HENT:      Head: Normocephalic.   Cardiovascular:      Rate and Rhythm: Normal rate and regular rhythm.      Heart sounds: Normal heart sounds.   Pulmonary:      Effort: Pulmonary effort is normal.   Musculoskeletal:         General: Normal range of motion.      Comments: In  wheelchair   Skin:     General: Skin is warm and dry.      Comments: Right lower leg dressing intact   Neurological:      Mental Status: She is alert and oriented to person, place, and time.   Psychiatric:         Behavior: Behavior normal.         Thought Content: Thought content normal.         Judgment: Judgment normal.       Assessment:       1. Hypertension associated with diabetes    2. Type 2 diabetes mellitus with diabetic nephropathy, with long-term current use of insulin    3. Mixed hyperlipidemia    4. H/o Cerebrovascular accident (CVA) of left pontine structure    5. Acute pain of right shoulder    6. Chronic obstructive pulmonary disease, unspecified COPD type    7. Overweight (BMI 25.0-29.9)        Plan:       Hypertension associated with diabetes  2 weeks nurse visit for BP check  Low sodium diet   BP Readings from Last 3 Encounters:   12/07/22 (!) 150/90   12/06/22 (!) 185/90   12/01/22 (!) 169/79      Type 2 diabetes mellitus with diabetic nephropathy, with long-term current use of insulin  Stable, continue medication  Follow the ADA " diet  Hemoglobin A1C   Date Value Ref Range Status   05/18/2022 7.2 (H) 4.5 - 6.2 % Final     Comment:     According to ADA guidelines, hemoglobin A1C <7.0% represents  optimal control in non-pregnant diabetic patients.  Different  metrics may apply to specific populations.   Standards of Medical Care in Diabetes - 2016.    For the purpose of screening for the presence of diabetes:  <5.7%     Consistent with the absence of diabetes  5.7-6.4%  Consistent with increasing risk for diabetes   (prediabetes)  >or=6.5%  Consistent with diabetes    Currently no consensus exists for use of hemoglobin A1C  for diagnosis of diabetes for children.     01/06/2022 12.7 (H) 4.5 - 6.2 % Final     Comment:     According to ADA guidelines, hemoglobin A1C <7.0% represents  optimal control in non-pregnant diabetic patients.  Different  metrics may apply to specific populations.   Standards of Medical Care in Diabetes - 2016.    For the purpose of screening for the presence of diabetes:  <5.7%     Consistent with the absence of diabetes  5.7-6.4%  Consistent with increasing risk for diabetes   (prediabetes)  >or=6.5%  Consistent with diabetes    Currently no consensus exists for use of hemoglobin A1C  for diagnosis of diabetes for children.     11/24/2021 14.8 (H) 4.5 - 6.2 % Final     Comment:     According to ADA guidelines, hemoglobin A1C <7.0% represents  optimal control in non-pregnant diabetic patients.  Different  metrics may apply to specific populations.   Standards of Medical Care in Diabetes - 2016.    For the purpose of screening for the presence of diabetes:  <5.7%     Consistent with the absence of diabetes  5.7-6.4%  Consistent with increasing risk for diabetes   (prediabetes)  >or=6.5%  Consistent with diabetes    Currently no consensus exists for use of hemoglobin A1C  for diagnosis of diabetes for children.        Mixed hyperlipidemia  Stable, on Lipitor  H/o Cerebrovascular accident (CVA) of left pontine  "structure  Stable, on Plavix  Acute pain of right shoulder  -     HYDROcodone-acetaminophen (NORCO) 5-325 mg per tablet; Take 1 tablet by mouth every 6 (six) hours as needed for Pain.  Dispense: 28 tablet; Refill: 0    Chronic obstructive pulmonary disease, unspecified COPD type  Stable, continue management  Overweight (BMI 25.0-29.9)    Counseled patient on his ideal body weight, health consequences of being obese and current recommendations including weekly exercise and a heart healthy diet.  Current BMI is:Estimated body mass index is 27.03 kg/m² as calculated from the following:    Height as of this encounter: 4' 11" (1.499 m).    Weight as of this encounter: 60.7 kg (133 lb 13.1 oz)..  Patient is aware that ideal BMI < 25 or Weight in (lb) to have BMI = 25: 123.5.       Patient readiness: acceptance and barriers:none    During the course of the visit the patient was educated and counseled about the following:     Diabetes:  Discussed general issues about diabetes pathophysiology and management.  Addressed ADA diet.  Discussed foot care.  Hypertension:   Dietary sodium restriction.  Regular aerobic exercise.    Goals: Hypertension: Reduce Blood Pressure    Did patient meet goals/outcomes: No    The following self management tools provided: blood pressure log    Patient Instructions (the written plan) was given to the patient/family.     Time spent with patient: 15 minutes    Barriers to medications present (no )    Adverse reactions to current medications (no)    Over the counter medications reviewed (Yes)            "

## 2022-12-09 LAB — BACTERIA SPEC AEROBE CULT: ABNORMAL

## 2022-12-10 ENCOUNTER — LAB VISIT (OUTPATIENT)
Dept: LAB | Facility: HOSPITAL | Age: 62
End: 2022-12-10
Attending: FAMILY MEDICINE
Payer: MEDICARE

## 2022-12-10 DIAGNOSIS — E11.21 TYPE 2 DIABETES MELLITUS WITH DIABETIC NEPHROPATHY, WITH LONG-TERM CURRENT USE OF INSULIN: ICD-10-CM

## 2022-12-10 DIAGNOSIS — Z79.4 TYPE 2 DIABETES MELLITUS WITH DIABETIC NEPHROPATHY, WITH LONG-TERM CURRENT USE OF INSULIN: ICD-10-CM

## 2022-12-10 DIAGNOSIS — E78.2 MIXED HYPERLIPIDEMIA: ICD-10-CM

## 2022-12-10 DIAGNOSIS — E55.9 VITAMIN D DEFICIENCY: ICD-10-CM

## 2022-12-10 LAB
25(OH)D3+25(OH)D2 SERPL-MCNC: 18 NG/ML (ref 30–96)
ALBUMIN SERPL BCP-MCNC: 3.4 G/DL (ref 3.5–5.2)
ALP SERPL-CCNC: 89 U/L (ref 55–135)
ALT SERPL W/O P-5'-P-CCNC: 22 U/L (ref 10–44)
ANION GAP SERPL CALC-SCNC: 8 MMOL/L (ref 8–16)
AST SERPL-CCNC: 16 U/L (ref 10–40)
BASOPHILS # BLD AUTO: 0.05 K/UL (ref 0–0.2)
BASOPHILS NFR BLD: 0.4 % (ref 0–1.9)
BILIRUB SERPL-MCNC: 0.4 MG/DL (ref 0.1–1)
BUN SERPL-MCNC: 21 MG/DL (ref 8–23)
CALCIUM SERPL-MCNC: 9.3 MG/DL (ref 8.7–10.5)
CHLORIDE SERPL-SCNC: 109 MMOL/L (ref 95–110)
CHOLEST SERPL-MCNC: 173 MG/DL (ref 120–199)
CHOLEST/HDLC SERPL: 2.6 {RATIO} (ref 2–5)
CO2 SERPL-SCNC: 24 MMOL/L (ref 23–29)
CREAT SERPL-MCNC: 1.1 MG/DL (ref 0.5–1.4)
DIFFERENTIAL METHOD: ABNORMAL
EOSINOPHIL # BLD AUTO: 0.2 K/UL (ref 0–0.5)
EOSINOPHIL NFR BLD: 1.9 % (ref 0–8)
ERYTHROCYTE [DISTWIDTH] IN BLOOD BY AUTOMATED COUNT: 14.3 % (ref 11.5–14.5)
EST. GFR  (NO RACE VARIABLE): 56 ML/MIN/1.73 M^2
ESTIMATED AVG GLUCOSE: 200 MG/DL (ref 68–131)
GLUCOSE SERPL-MCNC: 178 MG/DL (ref 70–110)
HBA1C MFR BLD: 8.6 % (ref 4–5.6)
HCT VFR BLD AUTO: 34.1 % (ref 37–48.5)
HDLC SERPL-MCNC: 66 MG/DL (ref 40–75)
HDLC SERPL: 38.2 % (ref 20–50)
HGB BLD-MCNC: 11.8 G/DL (ref 12–16)
IMM GRANULOCYTES # BLD AUTO: 0.05 K/UL (ref 0–0.04)
IMM GRANULOCYTES NFR BLD AUTO: 0.4 % (ref 0–0.5)
LDLC SERPL CALC-MCNC: 98.8 MG/DL (ref 63–159)
LYMPHOCYTES # BLD AUTO: 3.7 K/UL (ref 1–4.8)
LYMPHOCYTES NFR BLD: 33 % (ref 18–48)
MCH RBC QN AUTO: 28.2 PG (ref 27–31)
MCHC RBC AUTO-ENTMCNC: 34.6 G/DL (ref 32–36)
MCV RBC AUTO: 81 FL (ref 82–98)
MONOCYTES # BLD AUTO: 1 K/UL (ref 0.3–1)
MONOCYTES NFR BLD: 8.8 % (ref 4–15)
NEUTROPHILS # BLD AUTO: 6.2 K/UL (ref 1.8–7.7)
NEUTROPHILS NFR BLD: 55.5 % (ref 38–73)
NONHDLC SERPL-MCNC: 107 MG/DL
NRBC BLD-RTO: 0 /100 WBC
PLATELET # BLD AUTO: 358 K/UL (ref 150–450)
PMV BLD AUTO: 11.2 FL (ref 9.2–12.9)
POTASSIUM SERPL-SCNC: 4.3 MMOL/L (ref 3.5–5.1)
PROT SERPL-MCNC: 7.6 G/DL (ref 6–8.4)
RBC # BLD AUTO: 4.19 M/UL (ref 4–5.4)
SODIUM SERPL-SCNC: 141 MMOL/L (ref 136–145)
TRIGL SERPL-MCNC: 41 MG/DL (ref 30–150)
WBC # BLD AUTO: 11.23 K/UL (ref 3.9–12.7)

## 2022-12-10 PROCEDURE — 82306 VITAMIN D 25 HYDROXY: CPT | Performed by: FAMILY MEDICINE

## 2022-12-10 PROCEDURE — 80053 COMPREHEN METABOLIC PANEL: CPT | Performed by: FAMILY MEDICINE

## 2022-12-10 PROCEDURE — 80061 LIPID PANEL: CPT | Performed by: FAMILY MEDICINE

## 2022-12-10 PROCEDURE — 83036 HEMOGLOBIN GLYCOSYLATED A1C: CPT | Performed by: FAMILY MEDICINE

## 2022-12-10 PROCEDURE — 85025 COMPLETE CBC W/AUTO DIFF WBC: CPT | Performed by: FAMILY MEDICINE

## 2022-12-10 PROCEDURE — 36415 COLL VENOUS BLD VENIPUNCTURE: CPT | Performed by: FAMILY MEDICINE

## 2022-12-12 ENCOUNTER — PATIENT MESSAGE (OUTPATIENT)
Dept: ADMINISTRATIVE | Facility: HOSPITAL | Age: 62
End: 2022-12-12
Payer: MEDICARE

## 2022-12-12 ENCOUNTER — PATIENT MESSAGE (OUTPATIENT)
Dept: FAMILY MEDICINE | Facility: CLINIC | Age: 62
End: 2022-12-12
Payer: MEDICARE

## 2022-12-12 ENCOUNTER — PATIENT OUTREACH (OUTPATIENT)
Dept: ADMINISTRATIVE | Facility: HOSPITAL | Age: 62
End: 2022-12-12
Payer: MEDICARE

## 2022-12-12 LAB — BACTERIA SPEC ANAEROBE CULT: NORMAL

## 2022-12-12 NOTE — LETTER
December 12, 2022    Steff Johnson  [address]  [place] [address]             [facility]  [address]  [facility] [address]  Phone: [phone] Dear Abigail Price , [facility] is committed to your overall health.  To help you get the most out of each of your visits, we will review your information to make sure you are up to date on all of your recommended tests and/or procedures.       Your [facility] primary care team has found that your chart shows you may be due for          Health Maintenance Due   Topic    Shingles Vaccine (1 of 2)    Diabetes Urine Screening     Mammogram     COVID-19 Vaccine (3 - Booster)    Foot Exam     Colorectal Cancer Screening     Hemoglobin A1c     Eye Exam          If you have had any of the above done at another facility, please bring the records or information with you so that your record at [facility] will be complete.  If you would like to schedule any of these, please contact me.     If you are currently taking medication, please bring it with you to your appointment for review.     Also, if you have any type of Advanced Directives, please bring them with you to your office visit so we may scan them into your chart.     Thank you,     Cristina Ren MD and your [facility] Primary [facility] Team D

## 2022-12-12 NOTE — PROGRESS NOTES
"Attempted to outreach patient regarding overdue/ due HM via "[employer] App". No response after a week. Now sending outreach via mail out letter.  Health Maintenance Due   Topic Date Due    Shingles Vaccine (1 of 2) Never done    Diabetes Urine Screening  07/03/2020    Mammogram  03/09/2021    COVID-19 Vaccine (3 - Booster) 10/19/2021    Colorectal Cancer Screening  04/11/2022    Eye Exam  12/07/2022       "

## 2022-12-13 ENCOUNTER — OFFICE VISIT (OUTPATIENT)
Dept: WOUND CARE | Facility: HOSPITAL | Age: 62
End: 2022-12-13
Attending: PODIATRIST
Payer: MEDICARE

## 2022-12-13 VITALS
SYSTOLIC BLOOD PRESSURE: 210 MMHG | DIASTOLIC BLOOD PRESSURE: 103 MMHG | TEMPERATURE: 99 F | RESPIRATION RATE: 17 BRPM | HEART RATE: 69 BPM

## 2022-12-13 DIAGNOSIS — I83.018 VENOUS STASIS ULCER OF OTHER PART OF RIGHT LOWER LEG WITH FAT LAYER EXPOSED, UNSPECIFIED WHETHER VARICOSE VEINS PRESENT: ICD-10-CM

## 2022-12-13 DIAGNOSIS — R60.0 BILATERAL LOWER EXTREMITY EDEMA: ICD-10-CM

## 2022-12-13 DIAGNOSIS — E11.65 TYPE 2 DIABETES MELLITUS WITH HYPERGLYCEMIA, UNSPECIFIED WHETHER LONG TERM INSULIN USE: ICD-10-CM

## 2022-12-13 DIAGNOSIS — L97.912 NON-PRESSURE CHRONIC ULCER OF RIGHT LOWER LEG WITH FAT LAYER EXPOSED: Primary | ICD-10-CM

## 2022-12-13 DIAGNOSIS — L97.813 VENOUS STASIS ULCER OF OTHER PART OF RIGHT LOWER LEG WITH NECROSIS OF MUSCLE, UNSPECIFIED WHETHER VARICOSE VEINS PRESENT: ICD-10-CM

## 2022-12-13 DIAGNOSIS — E11.21 TYPE 2 DIABETES MELLITUS WITH DIABETIC NEPHROPATHY, WITH LONG-TERM CURRENT USE OF INSULIN: ICD-10-CM

## 2022-12-13 DIAGNOSIS — Z91.89 AT HIGH RISK FOR INADEQUATE NUTRITIONAL INTAKE: ICD-10-CM

## 2022-12-13 DIAGNOSIS — L97.812 VENOUS STASIS ULCER OF OTHER PART OF RIGHT LOWER LEG WITH FAT LAYER EXPOSED, UNSPECIFIED WHETHER VARICOSE VEINS PRESENT: ICD-10-CM

## 2022-12-13 DIAGNOSIS — L03.119 CELLULITIS AND ABSCESS OF LEG: ICD-10-CM

## 2022-12-13 DIAGNOSIS — I83.018 VENOUS STASIS ULCER OF OTHER PART OF RIGHT LOWER LEG WITH NECROSIS OF MUSCLE, UNSPECIFIED WHETHER VARICOSE VEINS PRESENT: ICD-10-CM

## 2022-12-13 DIAGNOSIS — E11.3313 TYPE 2 DIABETES MELLITUS WITH BOTH EYES AFFECTED BY MODERATE NONPROLIFERATIVE RETINOPATHY AND MACULAR EDEMA, WITH LONG-TERM CURRENT USE OF INSULIN: ICD-10-CM

## 2022-12-13 DIAGNOSIS — Z79.4 TYPE 2 DIABETES MELLITUS WITH BOTH EYES AFFECTED BY MODERATE NONPROLIFERATIVE RETINOPATHY AND MACULAR EDEMA, WITH LONG-TERM CURRENT USE OF INSULIN: ICD-10-CM

## 2022-12-13 DIAGNOSIS — L02.419 CELLULITIS AND ABSCESS OF LEG: ICD-10-CM

## 2022-12-13 DIAGNOSIS — L97.913 NON-PRESSURE CHRONIC ULCER OF RIGHT LOWER LEG WITH NECROSIS OF MUSCLE: ICD-10-CM

## 2022-12-13 DIAGNOSIS — Z79.4 TYPE 2 DIABETES MELLITUS WITH DIABETIC NEPHROPATHY, WITH LONG-TERM CURRENT USE OF INSULIN: ICD-10-CM

## 2022-12-13 PROCEDURE — 3077F SYST BP >= 140 MM HG: CPT | Mod: CPTII,,, | Performed by: PODIATRIST

## 2022-12-13 PROCEDURE — 1160F RVW MEDS BY RX/DR IN RCRD: CPT | Mod: CPTII,,, | Performed by: PODIATRIST

## 2022-12-13 PROCEDURE — 99214 OFFICE O/P EST MOD 30 MIN: CPT | Mod: 25,,, | Performed by: PODIATRIST

## 2022-12-13 PROCEDURE — 3080F PR MOST RECENT DIASTOLIC BLOOD PRESSURE >= 90 MM HG: ICD-10-PCS | Mod: CPTII,,, | Performed by: PODIATRIST

## 2022-12-13 PROCEDURE — 99214 PR OFFICE/OUTPT VISIT, EST, LEVL IV, 30-39 MIN: ICD-10-PCS | Mod: 25,,, | Performed by: PODIATRIST

## 2022-12-13 PROCEDURE — 1160F PR REVIEW ALL MEDS BY PRESCRIBER/CLIN PHARMACIST DOCUMENTED: ICD-10-PCS | Mod: CPTII,,, | Performed by: PODIATRIST

## 2022-12-13 PROCEDURE — 3080F DIAST BP >= 90 MM HG: CPT | Mod: CPTII,,, | Performed by: PODIATRIST

## 2022-12-13 PROCEDURE — 4010F ACE/ARB THERAPY RXD/TAKEN: CPT | Mod: CPTII,,, | Performed by: PODIATRIST

## 2022-12-13 PROCEDURE — 11042 PR DEBRIDEMENT, SKIN, SUB-Q TISSUE,=<20 SQ CM: ICD-10-PCS | Mod: ,,, | Performed by: PODIATRIST

## 2022-12-13 PROCEDURE — 3052F HG A1C>EQUAL 8.0%<EQUAL 9.0%: CPT | Mod: CPTII,,, | Performed by: PODIATRIST

## 2022-12-13 PROCEDURE — 4010F PR ACE/ARB THEARPY RXD/TAKEN: ICD-10-PCS | Mod: CPTII,,, | Performed by: PODIATRIST

## 2022-12-13 PROCEDURE — 3077F PR MOST RECENT SYSTOLIC BLOOD PRESSURE >= 140 MM HG: ICD-10-PCS | Mod: CPTII,,, | Performed by: PODIATRIST

## 2022-12-13 PROCEDURE — 11042 DBRDMT SUBQ TIS 1ST 20SQCM/<: CPT | Mod: ,,, | Performed by: PODIATRIST

## 2022-12-13 PROCEDURE — 11042 DBRDMT SUBQ TIS 1ST 20SQCM/<: CPT | Performed by: PODIATRIST

## 2022-12-13 PROCEDURE — 1159F MED LIST DOCD IN RCRD: CPT | Mod: CPTII,,, | Performed by: PODIATRIST

## 2022-12-13 PROCEDURE — 3052F PR MOST RECENT HEMOGLOBIN A1C LEVEL 8.0 - < 9.0%: ICD-10-PCS | Mod: CPTII,,, | Performed by: PODIATRIST

## 2022-12-13 PROCEDURE — 1159F PR MEDICATION LIST DOCUMENTED IN MEDICAL RECORD: ICD-10-PCS | Mod: CPTII,,, | Performed by: PODIATRIST

## 2022-12-13 PROCEDURE — 99499 UNLISTED E&M SERVICE: CPT | Mod: HCNC,,, | Performed by: PODIATRIST

## 2022-12-13 PROCEDURE — 99499 RISK ADDL DX/OHS AUDIT: ICD-10-PCS | Mod: HCNC,,, | Performed by: PODIATRIST

## 2022-12-13 NOTE — PROGRESS NOTES
[address]  [place] [address]  Phone: [phone]   [phone]    Patient's PCP:Cristina Ren MD  Referring Provider: Aaareferral Self    Subjective:      Chief Complaint:: Wound Care, Non-healing Wound, Venous Ulcer, Venous Stasis, Diabetes, Wound Check, Leg Swelling, and Wound Infection    HPI      Culture results reviewed.  Patient to continue taking doxycycline, which covers results of culture    Steff Johnson is a 62 y.o. female who presents today with a complaint of right lower leg venous ulcers lasting for approximately 1 week.  See Wound docs for full assessment and evaluation all right lower leg venous ulcers present.     1. Debridement of right anterior inferior lower leg wound.  Assessment and evaluation of remaining wounds located on right lower leg. See Wound Docs for assessment of wounds and procedure notes  2. Continue taking all medications as prescribed  3. Continue home health dressing changes  4. RTC one week   5. Counseled patient on increasing protein intake, not getting wound wet, keeping dressing clean dry and intact, following a healthy diet, elevating legs when able, removing pressure from wound     Total time spent for E&M 35  Total time for debridement 35 minutes         Culture taken of wound.  I will adjust antibiotics accordingly once the results of the culture return     Counseling/Education:  I provided patient education verbally regarding:   The aspects of diabetes and how it pertains to the feet. I explained the importance of proper diabetic foot care and how it is essential for the health of their feet.     I discussed the importance of knowing their Hemoglobin A1c and that the level needs to be as close to 6 as possible. I discussed the increase complications of high blood sugar including stroke, blindness, heart attack, kidney failure and loss of limb secondary to neuropathy and PVD.      With neuropathy, beware of any breaks in the skin or redness.  These areas are not recognized early due to the numbness.     I discussed Diabetes, lower back issues, metabolic disorders, systemic causes, chemotherapy, vitamin deficiency, heavy metal exposure, as some of the causes. I also explained that as much as 40% of the time we can not find a cause. I discussed different treatments available to control the symptoms but which may not cure the problem.         Counseled patient on the aspects of diabetes and how it pertains to the feet.  I explained the importance of proper diabetic foot care and how it is essential for the health of their feet.        Shoe inspection. Patient instructed on proper foot hygeine. We discussed wearing proper shoe gear, daily foot inspections, never walking without protective shoe gear, never putting sharp instruments to feet, routine podiatric nail visits every 2-3 months.          I counseled the patient on their conditions, their implications and medical management.     >50% of this > 60 minute visit was spent face to face educating/counseling the patient     I spent a total of 60 minutes on the day of the visit.This includes face to face time and non-face to face time preparing to see the patient (eg, review of tests), obtaining and/or reviewing separately obtained history, documenting clinical information in the electronic or other health record, independently interpreting results and communicating results to the patient/family/caregiver, or care coordinator.        Patient should call the office immediately if any signs of infection, such as fever, chills, sweats, increased redness or pain.     Patient was instructed to call the clinic or go to the emergency department if their symptoms do not improve, worsens, or if new symptoms develop.  Patient was advised that if any increased swelling, pain, or numbness arise to go immediately to the ED. Patient knows to call any time if an emergency arises. Shared decision making occurred and patient  verbalized understanding in agreement with this plan.      Much of the documentation for this visit was completed in the Wound Docs system.  Please see the attached documentation for further details about the patient's care. Scanned under the Media tab.         Dorys Crespo DPM        Vitals:    12/13/22 1215   BP: (!) 210/103   Pulse: 69   Resp: 17   Temp: 98.5 °F (36.9 °C)   PainSc: 0-No pain      Shoe Size:     Past Surgical History:   Procedure Laterality Date    COLONOSCOPY N/A 4/11/2017    Procedure: COLONOSCOPY;  Surgeon: Jared Antonio MD;  Location: [facility];  Service: Endoscopy;  Laterality: N/A;    HYSTERECTOMY      OOPHORECTOMY      SHOULDER SURGERY      R     Past Medical History:   Diagnosis Date    Arthritis     Complete tear of left rotator cuff 11/09/2017    COPD (chronic obstructive pulmonary disease)     COVID-19 infection 07/02/2021    Diabetes mellitus     H/o Cerebrovascular accident (CVA) of left pontine structure 12/12/2019    Hypertension     Personal history of colonic polyps     Stroke     Wears glasses      Family History   Problem Relation Age of Onset    Diabetes Mother     Asthma Mother     Hypertension Mother     Diabetes Father     Diabetes Brother     Hypertension Brother     Anemia Daughter     Glaucoma Neg Hx     Macular degeneration Neg Hx     Retinal detachment Neg Hx         Social History:   Marital Status: Single  Alcohol History:  reports no history of alcohol use.  Tobacco History:  reports that she has never smoked. She has never used smokeless tobacco.  Drug History:  reports no history of drug use.    Review of patient's allergies indicates:  No Known Allergies    Current Outpatient Medications   Medication Sig Dispense Refill    atorvastatin (LIPITOR) 40 MG tablet Take 1 tablet (40 mg total) by mouth once daily. 90 tablet 1    blood glucose control, low Soln Use as directed (Patient taking differently: 1 each by Misc.(Non-Drug; Combo Route) route. Use as directed)  "1 each 1    blood sugar diagnostic (TRUE METRIX GLUCOSE TEST STRIP) Strp Test blood sugar twice daily 200 strip 3    cloNIDine 0.1 mg/24 hr td ptwk (CATAPRES) 0.1 mg/24 hr Place 1 patch onto the skin every 7 days. 12 patch 1    clopidogreL (PLAVIX) 75 mg tablet Take 1 tablet (75 mg total) by mouth once daily. 90 tablet 1    doxycycline (MONODOX) 100 MG capsule Take 1 capsule (100 mg total) by mouth 2 (two) times daily. for 14 days 28 capsule 0    ergocalciferol (ERGOCALCIFEROL) 50,000 unit Cap Take 1 capsule (50,000 Units total) by mouth every 7 days. 12 capsule 1    hydrALAZINE (APRESOLINE) 100 MG tablet Take 1 tablet (100 mg total) by mouth 3 (three) times daily. 270 tablet 1    HYDROcodone-acetaminophen (NORCO)  mg per tablet       HYDROcodone-acetaminophen (NORCO) 5-325 mg per tablet Take 1 tablet by mouth every 6 (six) hours as needed for Pain. 28 tablet 0    insulin aspart U-100 (NOVOLOG FLEXPEN U-100 INSULIN) 100 unit/mL (3 mL) InPn pen Inject 14 Units subcutaneously into the skin 3 (three) times daily before meals. 45 mL 3    insulin degludec (TRESIBA FLEXTOUCH U-100) 100 unit/mL (3 mL) insulin pen ADMINISTER 40 UNITS UNDER THE SKIN EVERY EVENING 12 pen 1    lancets Misc 1 lancet by Misc.(Non-Drug; Combo Route) route 2 (two) times a day. To check BG 2 times daily, to use with insurance preferred meter 200 each 3    metoprolol succinate (TOPROL-XL) 100 MG 24 hr tablet Take 1 tablet (100 mg total) by mouth once daily. 90 tablet 1    pen needle, diabetic (BD ULTRA-FINE SHORT PEN NEEDLE) 31 gauge x 5/16" Ndle 1 pen by Misc.(Non-Drug; Combo Route) route 4 (four) times daily. 300 each 5    tiotropium (SPIRIVA) 18 mcg inhalation capsule Inhale 1 capsule (18 mcg total) into the lungs once daily. Controller 90 capsule 3    valsartan-hydrochlorothiazide (DIOVAN-HCT) 160-25 mg per tablet Take 1 tablet by mouth once daily. 90 tablet 1     No current facility-administered medications for this visit.       Review of " Systems   Constitutional:  Negative for chills, fatigue, fever and unexpected weight change.   HENT:  Negative for hearing loss and trouble swallowing.    Eyes:  Negative for photophobia and visual disturbance.   Respiratory:  Negative for cough, shortness of breath and wheezing.    Cardiovascular:  Positive for leg swelling. Negative for chest pain and palpitations.   Gastrointestinal:  Negative for abdominal pain and nausea.   Genitourinary:  Negative for dysuria and frequency.   Musculoskeletal:  Negative for arthralgias, back pain, gait problem, joint swelling and myalgias.   Skin:  Positive for wound. Negative for rash.   Neurological:  Negative for tremors, seizures, weakness, numbness and headaches.   Hematological:  Does not bruise/bleed easily.       Objective:        Physical Exam:   Foot Exam  Physical Exam  Ortho/SPM Exam     Imaging:            Assessment:       1. Non-pressure chronic ulcer of right lower leg with fat layer exposed    2. At high risk for inadequate nutritional intake    3. Cellulitis and abscess of leg    4. 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 Bilateral lower extremity edema    6. Venous stasis ulcer of other part of right lower leg with necrosis of muscle, unspecified whether varicose veins present    7. Venous stasis ulcer of other part of right lower leg with fat layer exposed, unspecified whether varicose veins present    8. Type 2 diabetes mellitus with both eyes affected by moderate nonproliferative retinopathy and macular edema, with long-term current use of insulin    9. Type 2 diabetes mellitus with hyperglycemia, unspecified whether long term insulin use    10. Type 2 diabetes mellitus with diabetic nephropathy, with long-term current use of insulin      Plan:   Non-pressure chronic ulcer of right lower leg with fat layer exposed    At high risk for inadequate nutritional intake    Cellulitis and abscess of leg    Non-pressure chronic  ulcer of right lower leg with necrosis of muscle    Bilateral lower extremity edema    Venous stasis ulcer of other part of right lower leg with necrosis of muscle, unspecified whether varicose veins present    Venous stasis ulcer of other part of right lower leg with fat layer exposed, unspecified whether varicose veins present    Type 2 diabetes mellitus with both eyes affected by moderate nonproliferative retinopathy and macular edema, with long-term current use of insulin    Type 2 diabetes mellitus with hyperglycemia, unspecified whether long term insulin use    Type 2 diabetes mellitus with diabetic nephropathy, with long-term current use of insulin      Follow up in about 1 week (around 12/20/2022).    Procedures          Counseling:     I provided patient education verbally regarding:   Patient diagnosis, treatment options, as well as alternatives, risks, and benefits.     This note was created using Dragon voice recognition software that occasionally misinterpreted phrases or words.

## 2022-12-14 ENCOUNTER — TELEPHONE (OUTPATIENT)
Dept: OPHTHALMOLOGY | Facility: CLINIC | Age: 62
End: 2022-12-14
Payer: MEDICARE

## 2022-12-14 ENCOUNTER — PATIENT MESSAGE (OUTPATIENT)
Dept: OPHTHALMOLOGY | Facility: CLINIC | Age: 62
End: 2022-12-14

## 2022-12-14 DIAGNOSIS — E11.9 TYPE 2 DIABETES MELLITUS WITHOUT COMPLICATION: ICD-10-CM

## 2022-12-14 NOTE — TELEPHONE ENCOUNTER
Unable to contact patient to reschedule, also sent message via [employer]            Unable to contact pt to reschedule this afternoons appt     -TD

## 2022-12-19 ENCOUNTER — OFFICE VISIT (OUTPATIENT)
Dept: WOUND CARE | Facility: HOSPITAL | Age: 62
End: 2022-12-19
Attending: PODIATRIST
Payer: MEDICARE

## 2022-12-19 ENCOUNTER — PATIENT MESSAGE (OUTPATIENT)
Dept: ADMINISTRATIVE | Facility: HOSPITAL | Age: 62
End: 2022-12-19
Payer: MEDICARE

## 2022-12-19 VITALS
RESPIRATION RATE: 17 BRPM | TEMPERATURE: 98 F | SYSTOLIC BLOOD PRESSURE: 213 MMHG | HEART RATE: 68 BPM | DIASTOLIC BLOOD PRESSURE: 107 MMHG

## 2022-12-19 DIAGNOSIS — L03.119 CELLULITIS AND ABSCESS OF LEG: ICD-10-CM

## 2022-12-19 DIAGNOSIS — L97.812 VENOUS STASIS ULCER OF OTHER PART OF RIGHT LOWER LEG WITH FAT LAYER EXPOSED, UNSPECIFIED WHETHER VARICOSE VEINS PRESENT: ICD-10-CM

## 2022-12-19 DIAGNOSIS — Z79.4 TYPE 2 DIABETES MELLITUS WITH DIABETIC NEPHROPATHY, WITH LONG-TERM CURRENT USE OF INSULIN: ICD-10-CM

## 2022-12-19 DIAGNOSIS — R60.0 BILATERAL LOWER EXTREMITY EDEMA: ICD-10-CM

## 2022-12-19 DIAGNOSIS — L02.419 CELLULITIS AND ABSCESS OF LEG: ICD-10-CM

## 2022-12-19 DIAGNOSIS — Z79.4 TYPE 2 DIABETES MELLITUS WITH BOTH EYES AFFECTED BY MODERATE NONPROLIFERATIVE RETINOPATHY AND MACULAR EDEMA, WITH LONG-TERM CURRENT USE OF INSULIN: ICD-10-CM

## 2022-12-19 DIAGNOSIS — Z91.89 AT HIGH RISK FOR INADEQUATE NUTRITIONAL INTAKE: ICD-10-CM

## 2022-12-19 DIAGNOSIS — L97.813 VENOUS STASIS ULCER OF OTHER PART OF RIGHT LOWER LEG WITH NECROSIS OF MUSCLE, UNSPECIFIED WHETHER VARICOSE VEINS PRESENT: ICD-10-CM

## 2022-12-19 DIAGNOSIS — E11.65 TYPE 2 DIABETES MELLITUS WITH HYPERGLYCEMIA, UNSPECIFIED WHETHER LONG TERM INSULIN USE: ICD-10-CM

## 2022-12-19 DIAGNOSIS — I83.018 VENOUS STASIS ULCER OF OTHER PART OF RIGHT LOWER LEG WITH FAT LAYER EXPOSED, UNSPECIFIED WHETHER VARICOSE VEINS PRESENT: ICD-10-CM

## 2022-12-19 DIAGNOSIS — I83.018 VENOUS STASIS ULCER OF OTHER PART OF RIGHT LOWER LEG WITH NECROSIS OF MUSCLE, UNSPECIFIED WHETHER VARICOSE VEINS PRESENT: ICD-10-CM

## 2022-12-19 DIAGNOSIS — E11.21 TYPE 2 DIABETES MELLITUS WITH DIABETIC NEPHROPATHY, WITH LONG-TERM CURRENT USE OF INSULIN: ICD-10-CM

## 2022-12-19 DIAGNOSIS — L97.912 NON-PRESSURE CHRONIC ULCER OF RIGHT LOWER LEG WITH FAT LAYER EXPOSED: Primary | ICD-10-CM

## 2022-12-19 DIAGNOSIS — L97.913 NON-PRESSURE CHRONIC ULCER OF RIGHT LOWER LEG WITH NECROSIS OF MUSCLE: ICD-10-CM

## 2022-12-19 DIAGNOSIS — E11.3313 TYPE 2 DIABETES MELLITUS WITH BOTH EYES AFFECTED BY MODERATE NONPROLIFERATIVE RETINOPATHY AND MACULAR EDEMA, WITH LONG-TERM CURRENT USE OF INSULIN: ICD-10-CM

## 2022-12-19 PROCEDURE — 3080F DIAST BP >= 90 MM HG: CPT | Mod: CPTII,,, | Performed by: PODIATRIST

## 2022-12-19 PROCEDURE — 99214 OFFICE O/P EST MOD 30 MIN: CPT | Mod: 25,,, | Performed by: PODIATRIST

## 2022-12-19 PROCEDURE — 11042 DBRDMT SUBQ TIS 1ST 20SQCM/<: CPT | Mod: ,,, | Performed by: PODIATRIST

## 2022-12-19 PROCEDURE — 99499 UNLISTED E&M SERVICE: CPT | Mod: HCNC,,, | Performed by: PODIATRIST

## 2022-12-19 PROCEDURE — 1159F MED LIST DOCD IN RCRD: CPT | Mod: CPTII,,, | Performed by: PODIATRIST

## 2022-12-19 PROCEDURE — 1160F RVW MEDS BY RX/DR IN RCRD: CPT | Mod: CPTII,,, | Performed by: PODIATRIST

## 2022-12-19 PROCEDURE — 11042 DBRDMT SUBQ TIS 1ST 20SQCM/<: CPT | Performed by: PODIATRIST

## 2022-12-19 PROCEDURE — 1160F PR REVIEW ALL MEDS BY PRESCRIBER/CLIN PHARMACIST DOCUMENTED: ICD-10-PCS | Mod: CPTII,,, | Performed by: PODIATRIST

## 2022-12-19 PROCEDURE — 3077F PR MOST RECENT SYSTOLIC BLOOD PRESSURE >= 140 MM HG: ICD-10-PCS | Mod: CPTII,,, | Performed by: PODIATRIST

## 2022-12-19 PROCEDURE — 3052F PR MOST RECENT HEMOGLOBIN A1C LEVEL 8.0 - < 9.0%: ICD-10-PCS | Mod: CPTII,,, | Performed by: PODIATRIST

## 2022-12-19 PROCEDURE — 99214 PR OFFICE/OUTPT VISIT, EST, LEVL IV, 30-39 MIN: ICD-10-PCS | Mod: 25,,, | Performed by: PODIATRIST

## 2022-12-19 PROCEDURE — 3052F HG A1C>EQUAL 8.0%<EQUAL 9.0%: CPT | Mod: CPTII,,, | Performed by: PODIATRIST

## 2022-12-19 PROCEDURE — 4010F PR ACE/ARB THEARPY RXD/TAKEN: ICD-10-PCS | Mod: CPTII,,, | Performed by: PODIATRIST

## 2022-12-19 PROCEDURE — 99499 RISK ADDL DX/OHS AUDIT: ICD-10-PCS | Mod: HCNC,,, | Performed by: PODIATRIST

## 2022-12-19 PROCEDURE — 3080F PR MOST RECENT DIASTOLIC BLOOD PRESSURE >= 90 MM HG: ICD-10-PCS | Mod: CPTII,,, | Performed by: PODIATRIST

## 2022-12-19 PROCEDURE — 3077F SYST BP >= 140 MM HG: CPT | Mod: CPTII,,, | Performed by: PODIATRIST

## 2022-12-19 PROCEDURE — 4010F ACE/ARB THERAPY RXD/TAKEN: CPT | Mod: CPTII,,, | Performed by: PODIATRIST

## 2022-12-19 PROCEDURE — 11042 PR DEBRIDEMENT, SKIN, SUB-Q TISSUE,=<20 SQ CM: ICD-10-PCS | Mod: ,,, | Performed by: PODIATRIST

## 2022-12-19 PROCEDURE — 1159F PR MEDICATION LIST DOCUMENTED IN MEDICAL RECORD: ICD-10-PCS | Mod: CPTII,,, | Performed by: PODIATRIST

## 2022-12-19 NOTE — PROGRESS NOTES
[address]  [place] [address]  Phone: [phone]   [phone]    Patient's PCP:Cristina Ren MD  Referring Provider: Aaareferral Self    Subjective:      Chief Complaint:: Wound Care, Non-healing Wound, Venous Stasis, Venous Ulcer, Diabetes, Wound Check, and Leg Swelling    HPI     Patient presents for follow-up of right anterior superior lower leg wound, right lateral superior lower leg wound, and right anterior inferior lower leg wound.    Steff Johnson is a 62 y.o. female who presents today with a complaint of right lower leg venous ulcers lasting for approximately 1 week.  See Wound docs for full assessment and evaluation all right lower leg venous ulcers present.     1. Debridement of right anterior inferior lower leg wound.  Assessment and evaluation of right anterior superior lower leg wound and right lateral superior lower leg wound. See Wound Docs for assessment of wounds and procedure notes  2. Continue taking all medications as prescribed  3. Continue home health dressing changes  4. RTC one week   5. Counseled patient on increasing protein intake, not getting wound wet, keeping dressing clean dry and intact, following a healthy diet, elevating legs when able, removing pressure from wound     Total time spent for E&M 35  Total time for debridement 35 minutes         Culture taken of wound.  I will adjust antibiotics accordingly once the results of the culture return     Counseling/Education:  I provided patient education verbally regarding:   The aspects of diabetes and how it pertains to the feet. I explained the importance of proper diabetic foot care and how it is essential for the health of their feet.     I discussed the importance of knowing their Hemoglobin A1c and that the level needs to be as close to 6 as possible. I discussed the increase complications of high blood sugar including stroke, blindness, heart attack, kidney failure and loss of limb secondary to  neuropathy and PVD.      With neuropathy, beware of any breaks in the skin or redness. These areas are not recognized early due to the numbness.     I discussed Diabetes, lower back issues, metabolic disorders, systemic causes, chemotherapy, vitamin deficiency, heavy metal exposure, as some of the causes. I also explained that as much as 40% of the time we can not find a cause. I discussed different treatments available to control the symptoms but which may not cure the problem.         Counseled patient on the aspects of diabetes and how it pertains to the feet.  I explained the importance of proper diabetic foot care and how it is essential for the health of their feet.        Shoe inspection. Patient instructed on proper foot hygeine. We discussed wearing proper shoe gear, daily foot inspections, never walking without protective shoe gear, never putting sharp instruments to feet, routine podiatric nail visits every 2-3 months.          I counseled the patient on their conditions, their implications and medical management.     >50% of this > 60 minute visit was spent face to face educating/counseling the patient     I spent a total of 60 minutes on the day of the visit.This includes face to face time and non-face to face time preparing to see the patient (eg, review of tests), obtaining and/or reviewing separately obtained history, documenting clinical information in the electronic or other health record, independently interpreting results and communicating results to the patient/family/caregiver, or care coordinator.        Patient should call the office immediately if any signs of infection, such as fever, chills, sweats, increased redness or pain.     Patient was instructed to call the clinic or go to the emergency department if their symptoms do not improve, worsens, or if new symptoms develop.  Patient was advised that if any increased swelling, pain, or numbness arise to go immediately to the ED. Patient  knows to call any time if an emergency arises. Shared decision making occurred and patient verbalized understanding in agreement with this plan.      Much of the documentation for this visit was completed in the Wound Docs system.  Please see the attached documentation for further details about the patient's care. Scanned under the Media tab.         Dorys Crespo DPM           Vitals:    12/19/22 1506   BP: (!) 213/107   Pulse: 68   Resp: 17   Temp: 98.3 °F (36.8 °C)   PainSc: 0-No pain      Shoe Size:     Past Surgical History:   Procedure Laterality Date    COLONOSCOPY N/A 4/11/2017    Procedure: COLONOSCOPY;  Surgeon: Jared Antonio MD;  Location: [facility];  Service: Endoscopy;  Laterality: N/A;    HYSTERECTOMY      OOPHORECTOMY      SHOULDER SURGERY      R     Past Medical History:   Diagnosis Date    Arthritis     Complete tear of left rotator cuff 11/09/2017    COPD (chronic obstructive pulmonary disease)     COVID-19 infection 07/02/2021    Diabetes mellitus     H/o Cerebrovascular accident (CVA) of left pontine structure 12/12/2019    Hypertension     Personal history of colonic polyps     Stroke     Wears glasses      Family History   Problem Relation Age of Onset    Diabetes Mother     Asthma Mother     Hypertension Mother     Diabetes Father     Diabetes Brother     Hypertension Brother     Anemia Daughter     Glaucoma Neg Hx     Macular degeneration Neg Hx     Retinal detachment Neg Hx         Social History:   Marital Status: Single  Alcohol History:  reports no history of alcohol use.  Tobacco History:  reports that she has never smoked. She has never used smokeless tobacco.  Drug History:  reports no history of drug use.    Review of patient's allergies indicates:  No Known Allergies    Current Outpatient Medications   Medication Sig Dispense Refill    atorvastatin (LIPITOR) 40 MG tablet Take 1 tablet (40 mg total) by mouth once daily. 90 tablet 1    blood glucose control, low Soln Use as  "directed (Patient taking differently: 1 each by Misc.(Non-Drug; Combo Route) route. Use as directed) 1 each 1    blood sugar diagnostic (TRUE METRIX GLUCOSE TEST STRIP) Strp Test blood sugar twice daily 200 strip 3    cloNIDine 0.1 mg/24 hr td ptwk (CATAPRES) 0.1 mg/24 hr Place 1 patch onto the skin every 7 days. 12 patch 1    clopidogreL (PLAVIX) 75 mg tablet Take 1 tablet (75 mg total) by mouth once daily. 90 tablet 1    doxycycline (MONODOX) 100 MG capsule Take 1 capsule (100 mg total) by mouth 2 (two) times daily. for 14 days 28 capsule 0    ergocalciferol (ERGOCALCIFEROL) 50,000 unit Cap Take 1 capsule (50,000 Units total) by mouth every 7 days. 12 capsule 1    hydrALAZINE (APRESOLINE) 100 MG tablet Take 1 tablet (100 mg total) by mouth 3 (three) times daily. 270 tablet 1    HYDROcodone-acetaminophen (NORCO)  mg per tablet       insulin aspart U-100 (NOVOLOG FLEXPEN U-100 INSULIN) 100 unit/mL (3 mL) InPn pen Inject 14 Units subcutaneously into the skin 3 (three) times daily before meals. 45 mL 3    insulin degludec (TRESIBA FLEXTOUCH U-100) 100 unit/mL (3 mL) insulin pen ADMINISTER 40 UNITS UNDER THE SKIN EVERY EVENING 12 pen 1    lancets Misc 1 lancet by Misc.(Non-Drug; Combo Route) route 2 (two) times a day. To check BG 2 times daily, to use with insurance preferred meter 200 each 3    metoprolol succinate (TOPROL-XL) 100 MG 24 hr tablet Take 1 tablet (100 mg total) by mouth once daily. 90 tablet 1    pen needle, diabetic (BD ULTRA-FINE SHORT PEN NEEDLE) 31 gauge x 5/16" Ndle 1 pen by Misc.(Non-Drug; Combo Route) route 4 (four) times daily. 300 each 5    tiotropium (SPIRIVA) 18 mcg inhalation capsule Inhale 1 capsule (18 mcg total) into the lungs once daily. Controller 90 capsule 3    valsartan-hydrochlorothiazide (DIOVAN-HCT) 160-25 mg per tablet Take 1 tablet by mouth once daily. 90 tablet 1     No current facility-administered medications for this visit.       Review of Systems   Constitutional:  " Negative for chills, fatigue, fever and unexpected weight change.   HENT:  Negative for hearing loss and trouble swallowing.    Eyes:  Negative for photophobia and visual disturbance.   Respiratory:  Negative for cough, shortness of breath and wheezing.    Cardiovascular:  Positive for leg swelling. Negative for chest pain and palpitations.   Gastrointestinal:  Negative for abdominal pain and nausea.   Genitourinary:  Negative for dysuria and frequency.   Musculoskeletal:  Negative for arthralgias, back pain, gait problem, joint swelling and myalgias.   Skin:  Positive for wound. Negative for rash.   Neurological:  Negative for tremors, seizures, weakness, numbness and headaches.   Hematological:  Does not bruise/bleed easily.       Objective:        Physical Exam:   Foot Exam  Physical Exam  Ortho/SPM Exam     Imaging:            Assessment:       1. Non-pressure chronic ulcer of right lower leg with fat layer exposed    2. At high risk for inadequate nutritional intake    3. Cellulitis and abscess of leg    4. 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 Bilateral lower extremity edema    6. Venous stasis ulcer of other part of right lower leg with necrosis of muscle, unspecified whether varicose veins present    7. Venous stasis ulcer of other part of right lower leg with fat layer exposed, unspecified whether varicose veins present    8. Type 2 diabetes mellitus with both eyes affected by moderate nonproliferative retinopathy and macular edema, with long-term current use of insulin    9. Type 2 diabetes mellitus with hyperglycemia, unspecified whether long term insulin use    10. Type 2 diabetes mellitus with diabetic nephropathy, with long-term current use of insulin      Plan:   Non-pressure chronic ulcer of right lower leg with fat layer exposed    At high risk for inadequate nutritional intake    Cellulitis and abscess of leg    Non-pressure chronic ulcer of right lower leg  with necrosis of muscle    Bilateral lower extremity edema    Venous stasis ulcer of other part of right lower leg with necrosis of muscle, unspecified whether varicose veins present    Venous stasis ulcer of other part of right lower leg with fat layer exposed, unspecified whether varicose veins present    Type 2 diabetes mellitus with both eyes affected by moderate nonproliferative retinopathy and macular edema, with long-term current use of insulin    Type 2 diabetes mellitus with hyperglycemia, unspecified whether long term insulin use    Type 2 diabetes mellitus with diabetic nephropathy, with long-term current use of insulin      Follow up in about 3 weeks (around 1/9/2023).    Procedures          Counseling:     I provided patient education verbally regarding:   Patient diagnosis, treatment options, as well as alternatives, risks, and benefits.     This note was created using Dragon voice recognition software that occasionally misinterpreted phrases or words.

## 2022-12-21 ENCOUNTER — OFFICE VISIT (OUTPATIENT)
Dept: OPHTHALMOLOGY | Facility: CLINIC | Age: 62
End: 2022-12-21
Payer: MEDICARE

## 2022-12-21 DIAGNOSIS — H25.813 COMBINED FORMS OF AGE-RELATED CATARACT, BILATERAL: ICD-10-CM

## 2022-12-21 DIAGNOSIS — H40.053 OCULAR HYPERTENSION, BILATERAL: ICD-10-CM

## 2022-12-21 DIAGNOSIS — E11.3313 TYPE 2 DIABETES MELLITUS WITH BOTH EYES AFFECTED BY MODERATE NONPROLIFERATIVE RETINOPATHY AND MACULAR EDEMA, WITH LONG-TERM CURRENT USE OF INSULIN: Primary | ICD-10-CM

## 2022-12-21 DIAGNOSIS — Z79.4 TYPE 2 DIABETES MELLITUS WITH BOTH EYES AFFECTED BY MODERATE NONPROLIFERATIVE RETINOPATHY AND MACULAR EDEMA, WITH LONG-TERM CURRENT USE OF INSULIN: Primary | ICD-10-CM

## 2022-12-21 PROCEDURE — 92134 OCT, RETINA - OU - BOTH EYES: ICD-10-PCS | Mod: S$GLB,,, | Performed by: OPHTHALMOLOGY

## 2022-12-21 PROCEDURE — 4010F ACE/ARB THERAPY RXD/TAKEN: CPT | Mod: CPTII,S$GLB,, | Performed by: OPHTHALMOLOGY

## 2022-12-21 PROCEDURE — 1160F PR REVIEW ALL MEDS BY PRESCRIBER/CLIN PHARMACIST DOCUMENTED: ICD-10-PCS | Mod: CPTII,S$GLB,, | Performed by: OPHTHALMOLOGY

## 2022-12-21 PROCEDURE — 99999 PR PBB SHADOW E&M-EST. PATIENT-LVL III: CPT | Mod: PBBFAC,,, | Performed by: OPHTHALMOLOGY

## 2022-12-21 PROCEDURE — 99999 PR PBB SHADOW E&M-EST. PATIENT-LVL III: ICD-10-PCS | Mod: PBBFAC,,, | Performed by: OPHTHALMOLOGY

## 2022-12-21 PROCEDURE — 1160F RVW MEDS BY RX/DR IN RCRD: CPT | Mod: CPTII,S$GLB,, | Performed by: OPHTHALMOLOGY

## 2022-12-21 PROCEDURE — 99214 OFFICE O/P EST MOD 30 MIN: CPT | Mod: S$GLB,,, | Performed by: OPHTHALMOLOGY

## 2022-12-21 PROCEDURE — 1159F MED LIST DOCD IN RCRD: CPT | Mod: CPTII,S$GLB,, | Performed by: OPHTHALMOLOGY

## 2022-12-21 PROCEDURE — 4010F PR ACE/ARB THEARPY RXD/TAKEN: ICD-10-PCS | Mod: CPTII,S$GLB,, | Performed by: OPHTHALMOLOGY

## 2022-12-21 PROCEDURE — 1159F PR MEDICATION LIST DOCUMENTED IN MEDICAL RECORD: ICD-10-PCS | Mod: CPTII,S$GLB,, | Performed by: OPHTHALMOLOGY

## 2022-12-21 PROCEDURE — 92134 CPTRZ OPH DX IMG PST SGM RTA: CPT | Mod: S$GLB,,, | Performed by: OPHTHALMOLOGY

## 2022-12-21 PROCEDURE — 99214 PR OFFICE/OUTPT VISIT, EST, LEVL IV, 30-39 MIN: ICD-10-PCS | Mod: S$GLB,,, | Performed by: OPHTHALMOLOGY

## 2022-12-21 PROCEDURE — 3052F PR MOST RECENT HEMOGLOBIN A1C LEVEL 8.0 - < 9.0%: ICD-10-PCS | Mod: CPTII,S$GLB,, | Performed by: OPHTHALMOLOGY

## 2022-12-21 PROCEDURE — 3052F HG A1C>EQUAL 8.0%<EQUAL 9.0%: CPT | Mod: CPTII,S$GLB,, | Performed by: OPHTHALMOLOGY

## 2022-12-21 RX ORDER — BRIMONIDINE TARTRATE 2 MG/ML
1 SOLUTION/ DROPS OPHTHALMIC 3 TIMES DAILY
Qty: 15 ML | Refills: 3 | Status: SHIPPED | OUTPATIENT
Start: 2022-12-21 | End: 2023-02-24 | Stop reason: SDUPTHER

## 2022-12-21 NOTE — PROGRESS NOTES
HPI    Pt presents for retina eval, per Dr Ambrose    Complains of blurred vision OU     States no ocular meds       Hemoglobin A1C       Date                     Value               Ref Range             Status                12/10/2022               8.6 (H)             4.0 - 5.6 %           Final                     05/18/2022               7.2 (H)             4.5 - 6.2 %           Final                      01/06/2022               12.7 (H)            4.5 - 6.2 %           Final                  Last edited by Louann Hoover on 12/21/2022  3:08 PM.         A/P    ICD-10-CM ICD-9-CM   1. Type 2 diabetes mellitus with both eyes affected by moderate nonproliferative retinopathy and macular edema, with long-term current use of insulin  E11.3313 250.50    Z79.4 362.05     362.07     V58.67   2. Ocular hypertension, bilateral  H40.053 365.04   3. Combined forms of age-related cataract, bilateral  H25.813 366.19       1. Type 2 diabetes mellitus with both eyes affected by moderate nonproliferative retinopathy and macular edema, with long-term current use of insulin  Referral by Dr. Ambrose for DR/DME check  PCP Cristina Ren MD  12/10/2022  8.6  A1C     Pt had recent strokes this month and last month, in wheelchair    OD: no injections or laser  VA 20/30, mod foveal IRF, no NV  Plan: needs treatment for DME but given recent CVA will hold off on antiVEGF, will place auth for Ozurdex for better control of DME in context of recent stroke    OS: no injections or laser  VA 20/30, mild JF IRF good foveal contour  Plan: Observation closely    Recommend good blood pressure control, tight blood glucose control, and good cholesterol control     2. Ocular hypertension, bilateral  IOP 27/35, thick corneas   Last saw Dr Ambrose 12/2021  Will reschedule f/u and start Brim BID OU    3. Combined forms of age-related cataract, bilateral  Mod NS, borderline VS  Plan: Observation, update Mrx recent ones are old    RTC Fiorella 1 mo DFE/OCTm  OU, Ozurdex OD get prior auth   RTC Arnol next avail for IOP check  RTC Ninh next avail Mrx     I saw and examined the patient and reviewed in detail the findings documented. The final examination findings, image interpretations, and plan as documented in the record represent my personal judgment and conclusions.    Fito Barros MD  Vitreoretinal Surgery   [facility]

## 2022-12-29 DIAGNOSIS — E55.9 VITAMIN D DEFICIENCY: ICD-10-CM

## 2022-12-29 DIAGNOSIS — N18.32 STAGE 3B CHRONIC KIDNEY DISEASE: ICD-10-CM

## 2022-12-29 DIAGNOSIS — E78.2 MIXED HYPERLIPIDEMIA: ICD-10-CM

## 2022-12-29 DIAGNOSIS — E11.21 TYPE 2 DIABETES MELLITUS WITH DIABETIC NEPHROPATHY, WITH LONG-TERM CURRENT USE OF INSULIN: Primary | ICD-10-CM

## 2022-12-29 DIAGNOSIS — Z79.4 TYPE 2 DIABETES MELLITUS WITH DIABETIC NEPHROPATHY, WITH LONG-TERM CURRENT USE OF INSULIN: Primary | ICD-10-CM

## 2023-01-03 ENCOUNTER — OFFICE VISIT (OUTPATIENT)
Dept: WOUND CARE | Facility: HOSPITAL | Age: 63
End: 2023-01-03
Attending: PODIATRIST
Payer: MEDICARE

## 2023-01-03 VITALS
RESPIRATION RATE: 20 BRPM | TEMPERATURE: 98 F | HEART RATE: 92 BPM | SYSTOLIC BLOOD PRESSURE: 153 MMHG | DIASTOLIC BLOOD PRESSURE: 83 MMHG

## 2023-01-03 DIAGNOSIS — Z79.4 TYPE 2 DIABETES MELLITUS WITH BOTH EYES AFFECTED BY MODERATE NONPROLIFERATIVE RETINOPATHY AND MACULAR EDEMA, WITH LONG-TERM CURRENT USE OF INSULIN: ICD-10-CM

## 2023-01-03 DIAGNOSIS — Z79.4 TYPE 2 DIABETES MELLITUS WITH DIABETIC NEPHROPATHY, WITH LONG-TERM CURRENT USE OF INSULIN: ICD-10-CM

## 2023-01-03 DIAGNOSIS — L03.119 CELLULITIS AND ABSCESS OF LEG: ICD-10-CM

## 2023-01-03 DIAGNOSIS — E11.21 TYPE 2 DIABETES MELLITUS WITH DIABETIC NEPHROPATHY, WITH LONG-TERM CURRENT USE OF INSULIN: ICD-10-CM

## 2023-01-03 DIAGNOSIS — Z91.89 AT HIGH RISK FOR INADEQUATE NUTRITIONAL INTAKE: ICD-10-CM

## 2023-01-03 DIAGNOSIS — E11.65 TYPE 2 DIABETES MELLITUS WITH HYPERGLYCEMIA, UNSPECIFIED WHETHER LONG TERM INSULIN USE: ICD-10-CM

## 2023-01-03 DIAGNOSIS — E11.3313 TYPE 2 DIABETES MELLITUS WITH BOTH EYES AFFECTED BY MODERATE NONPROLIFERATIVE RETINOPATHY AND MACULAR EDEMA, WITH LONG-TERM CURRENT USE OF INSULIN: ICD-10-CM

## 2023-01-03 DIAGNOSIS — L97.912 NON-PRESSURE CHRONIC ULCER OF RIGHT LOWER LEG WITH FAT LAYER EXPOSED: Primary | ICD-10-CM

## 2023-01-03 DIAGNOSIS — R60.0 BILATERAL LOWER EXTREMITY EDEMA: ICD-10-CM

## 2023-01-03 DIAGNOSIS — I83.018 VENOUS STASIS ULCER OF OTHER PART OF RIGHT LOWER LEG WITH NECROSIS OF MUSCLE, UNSPECIFIED WHETHER VARICOSE VEINS PRESENT: ICD-10-CM

## 2023-01-03 DIAGNOSIS — I83.018 VENOUS STASIS ULCER OF OTHER PART OF RIGHT LOWER LEG WITH FAT LAYER EXPOSED, UNSPECIFIED WHETHER VARICOSE VEINS PRESENT: ICD-10-CM

## 2023-01-03 DIAGNOSIS — L02.419 CELLULITIS AND ABSCESS OF LEG: ICD-10-CM

## 2023-01-03 DIAGNOSIS — L97.913 NON-PRESSURE CHRONIC ULCER OF RIGHT LOWER LEG WITH NECROSIS OF MUSCLE: ICD-10-CM

## 2023-01-03 DIAGNOSIS — L97.812 VENOUS STASIS ULCER OF OTHER PART OF RIGHT LOWER LEG WITH FAT LAYER EXPOSED, UNSPECIFIED WHETHER VARICOSE VEINS PRESENT: ICD-10-CM

## 2023-01-03 DIAGNOSIS — L97.813 VENOUS STASIS ULCER OF OTHER PART OF RIGHT LOWER LEG WITH NECROSIS OF MUSCLE, UNSPECIFIED WHETHER VARICOSE VEINS PRESENT: ICD-10-CM

## 2023-01-03 PROCEDURE — 1159F PR MEDICATION LIST DOCUMENTED IN MEDICAL RECORD: ICD-10-PCS | Mod: HCNC,CPTII,, | Performed by: PODIATRIST

## 2023-01-03 PROCEDURE — 11042 PR DEBRIDEMENT, SKIN, SUB-Q TISSUE,=<20 SQ CM: ICD-10-PCS | Mod: HCNC,,, | Performed by: PODIATRIST

## 2023-01-03 PROCEDURE — 99499 RISK ADDL DX/OHS AUDIT: ICD-10-PCS | Mod: ,,, | Performed by: PODIATRIST

## 2023-01-03 PROCEDURE — 3077F SYST BP >= 140 MM HG: CPT | Mod: HCNC,CPTII,, | Performed by: PODIATRIST

## 2023-01-03 PROCEDURE — 99214 PR OFFICE/OUTPT VISIT, EST, LEVL IV, 30-39 MIN: ICD-10-PCS | Mod: 25,HCNC,, | Performed by: PODIATRIST

## 2023-01-03 PROCEDURE — 1159F MED LIST DOCD IN RCRD: CPT | Mod: HCNC,CPTII,, | Performed by: PODIATRIST

## 2023-01-03 PROCEDURE — 11042 DBRDMT SUBQ TIS 1ST 20SQCM/<: CPT | Mod: HCNC,,, | Performed by: PODIATRIST

## 2023-01-03 PROCEDURE — 99499 UNLISTED E&M SERVICE: CPT | Mod: ,,, | Performed by: PODIATRIST

## 2023-01-03 PROCEDURE — 3079F PR MOST RECENT DIASTOLIC BLOOD PRESSURE 80-89 MM HG: ICD-10-PCS | Mod: HCNC,CPTII,, | Performed by: PODIATRIST

## 2023-01-03 PROCEDURE — 99214 OFFICE O/P EST MOD 30 MIN: CPT | Mod: 25,HCNC,, | Performed by: PODIATRIST

## 2023-01-03 PROCEDURE — 3077F PR MOST RECENT SYSTOLIC BLOOD PRESSURE >= 140 MM HG: ICD-10-PCS | Mod: HCNC,CPTII,, | Performed by: PODIATRIST

## 2023-01-03 PROCEDURE — 11042 DBRDMT SUBQ TIS 1ST 20SQCM/<: CPT | Mod: HCNC | Performed by: PODIATRIST

## 2023-01-03 PROCEDURE — 3079F DIAST BP 80-89 MM HG: CPT | Mod: HCNC,CPTII,, | Performed by: PODIATRIST

## 2023-01-03 NOTE — PROGRESS NOTES
[address]  [place] [address]  Phone: [phone]   [phone]    Patient's PCP:Cristina Ren MD  Referring Provider: Aaareferral Self    Subjective:      Chief Complaint:: Wound Care, Non-healing Wound, Venous Ulcer, Venous Stasis, Diabetes, Leg Swelling, and Wound Check    HPI    Patient presents for follow-up of right anterior superior lower leg wound and right anterior inferior lower leg wound.     Steff oJhnson is a 62 y.o. female who presents today with a complaint of right lower leg venous ulcers lasting for approximately 1 week.  See Wound docs for full assessment and evaluation all right lower leg venous ulcers present.     1. Debridement of right anterior superior lower leg wound.  Assessment and evaluation of right anterior inferior lower leg wound. See Wound Docs for assessment of wounds and procedure notes  2. Continue taking all medications as prescribed  3. Continue home health dressing changes  4. RTC one week   5. Counseled patient on increasing protein intake, not getting wound wet, keeping dressing clean dry and intact, following a healthy diet, elevating legs when able, removing pressure from wound     Total time spent for E&M 35  Total time for debridement 35 minutes            Counseling/Education:  I provided patient education verbally regarding:   The aspects of diabetes and how it pertains to the feet. I explained the importance of proper diabetic foot care and how it is essential for the health of their feet.     I discussed the importance of knowing their Hemoglobin A1c and that the level needs to be as close to 6 as possible. I discussed the increase complications of high blood sugar including stroke, blindness, heart attack, kidney failure and loss of limb secondary to neuropathy and PVD.      With neuropathy, beware of any breaks in the skin or redness. These areas are not recognized early due to the numbness.     I discussed Diabetes, lower back issues,  metabolic disorders, systemic causes, chemotherapy, vitamin deficiency, heavy metal exposure, as some of the causes. I also explained that as much as 40% of the time we can not find a cause. I discussed different treatments available to control the symptoms but which may not cure the problem.         Counseled patient on the aspects of diabetes and how it pertains to the feet.  I explained the importance of proper diabetic foot care and how it is essential for the health of their feet.        Shoe inspection. Patient instructed on proper foot hygeine. We discussed wearing proper shoe gear, daily foot inspections, never walking without protective shoe gear, never putting sharp instruments to feet, routine podiatric nail visits every 2-3 months.          I counseled the patient on their conditions, their implications and medical management.     >50% of this > 60 minute visit was spent face to face educating/counseling the patient     I spent a total of 60 minutes on the day of the visit.This includes face to face time and non-face to face time preparing to see the patient (eg, review of tests), obtaining and/or reviewing separately obtained history, documenting clinical information in the electronic or other health record, independently interpreting results and communicating results to the patient/family/caregiver, or care coordinator.        Patient should call the office immediately if any signs of infection, such as fever, chills, sweats, increased redness or pain.     Patient was instructed to call the clinic or go to the emergency department if their symptoms do not improve, worsens, or if new symptoms develop.  Patient was advised that if any increased swelling, pain, or numbness arise to go immediately to the ED. Patient knows to call any time if an emergency arises. Shared decision making occurred and patient verbalized understanding in agreement with this plan.      Much of the documentation for this visit  was completed in the Wound Docs system.  Please see the attached documentation for further details about the patient's care. Scanned under the Media tab.         Dorys Crespo DPM           Vitals:    01/03/23 1628   BP: (!) 153/83   Pulse: 92   Resp: 20   Temp: 98.2 °F (36.8 °C)   PainSc: 0-No pain      Shoe Size:     Past Surgical History:   Procedure Laterality Date    COLONOSCOPY N/A 4/11/2017    Procedure: COLONOSCOPY;  Surgeon: Jared Antonio MD;  Location: [facility];  Service: Endoscopy;  Laterality: N/A;    HYSTERECTOMY      OOPHORECTOMY      SHOULDER SURGERY      R     Past Medical History:   Diagnosis Date    Arthritis     Complete tear of left rotator cuff 11/09/2017    COPD (chronic obstructive pulmonary disease)     COVID-19 infection 07/02/2021    Diabetes mellitus     H/o Cerebrovascular accident (CVA) of left pontine structure 12/12/2019    Hypertension     Personal history of colonic polyps     Stroke     Wears glasses      Family History   Problem Relation Age of Onset    Diabetes Mother     Asthma Mother     Hypertension Mother     Diabetes Father     Diabetes Brother     Hypertension Brother     Anemia Daughter     Glaucoma Neg Hx     Macular degeneration Neg Hx     Retinal detachment Neg Hx         Social History:   Marital Status: Single  Alcohol History:  reports no history of alcohol use.  Tobacco History:  reports that she has never smoked. She has never used smokeless tobacco.  Drug History:  reports no history of drug use.    Review of patient's allergies indicates:  No Known Allergies    Current Outpatient Medications   Medication Sig Dispense Refill    atorvastatin (LIPITOR) 40 MG tablet Take 1 tablet (40 mg total) by mouth once daily. 90 tablet 1    blood glucose control, low Soln Use as directed (Patient taking differently: 1 each by Misc.(Non-Drug; Combo Route) route. Use as directed) 1 each 1    blood sugar diagnostic (TRUE METRIX GLUCOSE TEST STRIP) Strp Test blood sugar twice  "daily 200 strip 3    brimonidine 0.2% (ALPHAGAN) 0.2 % Drop Place 1 drop into both eyes 3 (three) times daily. 15 mL 3    cloNIDine 0.1 mg/24 hr td ptwk (CATAPRES) 0.1 mg/24 hr Place 1 patch onto the skin every 7 days. 12 patch 1    clopidogreL (PLAVIX) 75 mg tablet Take 1 tablet (75 mg total) by mouth once daily. 90 tablet 1    ergocalciferol (ERGOCALCIFEROL) 50,000 unit Cap Take 1 capsule (50,000 Units total) by mouth every 7 days. 12 capsule 1    hydrALAZINE (APRESOLINE) 100 MG tablet Take 1 tablet (100 mg total) by mouth 3 (three) times daily. 270 tablet 1    HYDROcodone-acetaminophen (NORCO)  mg per tablet       insulin aspart U-100 (NOVOLOG FLEXPEN U-100 INSULIN) 100 unit/mL (3 mL) InPn pen Inject 14 Units subcutaneously into the skin 3 (three) times daily before meals. 45 mL 3    insulin degludec (TRESIBA FLEXTOUCH U-100) 100 unit/mL (3 mL) insulin pen ADMINISTER 40 UNITS UNDER THE SKIN EVERY EVENING 12 pen 1    lancets Misc 1 lancet by Misc.(Non-Drug; Combo Route) route 2 (two) times a day. To check BG 2 times daily, to use with insurance preferred meter 200 each 3    metoprolol succinate (TOPROL-XL) 100 MG 24 hr tablet Take 1 tablet (100 mg total) by mouth once daily. 90 tablet 1    pen needle, diabetic (BD ULTRA-FINE SHORT PEN NEEDLE) 31 gauge x 5/16" Ndle 1 pen by Misc.(Non-Drug; Combo Route) route 4 (four) times daily. 300 each 5    tiotropium (SPIRIVA) 18 mcg inhalation capsule Inhale 1 capsule (18 mcg total) into the lungs once daily. Controller 90 capsule 3    valsartan-hydrochlorothiazide (DIOVAN-HCT) 160-25 mg per tablet Take 1 tablet by mouth once daily. 90 tablet 1     No current facility-administered medications for this visit.       Review of Systems   Constitutional:  Negative for chills, fatigue, fever and unexpected weight change.   HENT:  Negative for hearing loss and trouble swallowing.    Eyes:  Negative for photophobia and visual disturbance.   Respiratory:  Negative for cough, " shortness of breath and wheezing.    Cardiovascular:  Positive for leg swelling. Negative for chest pain and palpitations.   Gastrointestinal:  Negative for abdominal pain and nausea.   Genitourinary:  Negative for dysuria and frequency.   Musculoskeletal:  Negative for arthralgias, back pain, gait problem, joint swelling and myalgias.   Skin:  Positive for wound. Negative for rash.   Neurological:  Negative for tremors, seizures, weakness, numbness and headaches.   Hematological:  Does not bruise/bleed easily.       Objective:        Physical Exam:   Foot Exam  Physical Exam  Ortho/SPM Exam     Imaging:            Assessment:       1. Non-pressure chronic ulcer of right lower leg with fat layer exposed    2. At high risk for inadequate nutritional intake    3. Cellulitis and abscess of leg    4. Bilateral lower extremity edema    5. Venous stasis ulcer of other part of right lower leg with necrosis of muscle, unspecified whether varicose veins present    6. Venous stasis ulcer of other part of right lower leg with fat layer exposed, unspecified whether varicose veins present    7. Type 2 diabetes mellitus with both eyes affected by moderate nonproliferative retinopathy and macular edema, with long-term current use of insulin    8. Type 2 diabetes mellitus with diabetic nephropathy, with long-term current use of insulin    9. Type 2 diabetes mellitus with hyperglycemia, unspecified whether long term insulin use    10. Non-pressure chronic ulcer of right lower leg with necrosis of muscle      Plan:   Non-pressure chronic ulcer of right lower leg with fat layer exposed    At high risk for inadequate nutritional intake    Cellulitis and abscess of leg    Bilateral lower extremity edema    Venous stasis ulcer of other part of right lower leg with necrosis of muscle, unspecified whether varicose veins present    Venous stasis ulcer of other part of right lower leg with fat layer exposed, unspecified whether varicose  veins present    Type 2 diabetes mellitus with both eyes affected by moderate nonproliferative retinopathy and macular edema, with long-term current use of insulin    Type 2 diabetes mellitus with diabetic nephropathy, with long-term current use of insulin    Type 2 diabetes mellitus with hyperglycemia, unspecified whether long term insulin use    Non-pressure chronic ulcer of right lower leg with necrosis of muscle      Follow up in about 1 week (around 1/10/2023).    Procedures          Counseling:     I provided patient education verbally regarding:   Patient diagnosis, treatment options, as well as alternatives, risks, and benefits.     This note was created using Dragon voice recognition software that occasionally misinterpreted phrases or words.

## 2023-01-10 ENCOUNTER — TELEPHONE (OUTPATIENT)
Dept: ADMINISTRATIVE | Facility: CLINIC | Age: 63
End: 2023-01-10
Payer: MEDICARE

## 2023-01-10 ENCOUNTER — OFFICE VISIT (OUTPATIENT)
Dept: WOUND CARE | Facility: HOSPITAL | Age: 63
End: 2023-01-10
Attending: PODIATRIST
Payer: MEDICARE

## 2023-01-10 VITALS — WEIGHT: 130 LBS | BODY MASS INDEX: 26.21 KG/M2 | HEIGHT: 59 IN

## 2023-01-10 DIAGNOSIS — I83.018 VENOUS STASIS ULCER OF OTHER PART OF RIGHT LOWER LEG WITH NECROSIS OF MUSCLE, UNSPECIFIED WHETHER VARICOSE VEINS PRESENT: ICD-10-CM

## 2023-01-10 DIAGNOSIS — L97.912 NON-PRESSURE CHRONIC ULCER OF RIGHT LOWER LEG WITH FAT LAYER EXPOSED: Primary | ICD-10-CM

## 2023-01-10 DIAGNOSIS — Z91.89 AT HIGH RISK FOR INADEQUATE NUTRITIONAL INTAKE: ICD-10-CM

## 2023-01-10 DIAGNOSIS — E11.65 TYPE 2 DIABETES MELLITUS WITH HYPERGLYCEMIA, UNSPECIFIED WHETHER LONG TERM INSULIN USE: ICD-10-CM

## 2023-01-10 DIAGNOSIS — I83.018 VENOUS STASIS ULCER OF OTHER PART OF RIGHT LOWER LEG WITH FAT LAYER EXPOSED, UNSPECIFIED WHETHER VARICOSE VEINS PRESENT: ICD-10-CM

## 2023-01-10 DIAGNOSIS — R60.0 BILATERAL LOWER EXTREMITY EDEMA: ICD-10-CM

## 2023-01-10 DIAGNOSIS — L97.813 VENOUS STASIS ULCER OF OTHER PART OF RIGHT LOWER LEG WITH NECROSIS OF MUSCLE, UNSPECIFIED WHETHER VARICOSE VEINS PRESENT: ICD-10-CM

## 2023-01-10 DIAGNOSIS — E11.3313 TYPE 2 DIABETES MELLITUS WITH BOTH EYES AFFECTED BY MODERATE NONPROLIFERATIVE RETINOPATHY AND MACULAR EDEMA, WITH LONG-TERM CURRENT USE OF INSULIN: ICD-10-CM

## 2023-01-10 DIAGNOSIS — L97.812 VENOUS STASIS ULCER OF OTHER PART OF RIGHT LOWER LEG WITH FAT LAYER EXPOSED, UNSPECIFIED WHETHER VARICOSE VEINS PRESENT: ICD-10-CM

## 2023-01-10 DIAGNOSIS — L02.419 CELLULITIS AND ABSCESS OF LEG: ICD-10-CM

## 2023-01-10 DIAGNOSIS — L03.119 CELLULITIS AND ABSCESS OF LEG: ICD-10-CM

## 2023-01-10 DIAGNOSIS — Z79.4 TYPE 2 DIABETES MELLITUS WITH DIABETIC NEPHROPATHY, WITH LONG-TERM CURRENT USE OF INSULIN: ICD-10-CM

## 2023-01-10 DIAGNOSIS — E11.21 TYPE 2 DIABETES MELLITUS WITH DIABETIC NEPHROPATHY, WITH LONG-TERM CURRENT USE OF INSULIN: ICD-10-CM

## 2023-01-10 DIAGNOSIS — Z79.4 TYPE 2 DIABETES MELLITUS WITH BOTH EYES AFFECTED BY MODERATE NONPROLIFERATIVE RETINOPATHY AND MACULAR EDEMA, WITH LONG-TERM CURRENT USE OF INSULIN: ICD-10-CM

## 2023-01-10 DIAGNOSIS — L97.913 NON-PRESSURE CHRONIC ULCER OF RIGHT LOWER LEG WITH NECROSIS OF MUSCLE: ICD-10-CM

## 2023-01-10 PROCEDURE — 99214 PR OFFICE/OUTPT VISIT, EST, LEVL IV, 30-39 MIN: ICD-10-PCS | Mod: 25,HCNC,, | Performed by: PODIATRIST

## 2023-01-10 PROCEDURE — 11042 DBRDMT SUBQ TIS 1ST 20SQCM/<: CPT | Mod: HCNC | Performed by: PODIATRIST

## 2023-01-10 PROCEDURE — 1160F RVW MEDS BY RX/DR IN RCRD: CPT | Mod: HCNC,CPTII,, | Performed by: PODIATRIST

## 2023-01-10 PROCEDURE — 1159F PR MEDICATION LIST DOCUMENTED IN MEDICAL RECORD: ICD-10-PCS | Mod: HCNC,CPTII,, | Performed by: PODIATRIST

## 2023-01-10 PROCEDURE — 99214 OFFICE O/P EST MOD 30 MIN: CPT | Mod: 25,HCNC,, | Performed by: PODIATRIST

## 2023-01-10 PROCEDURE — 11042 PR DEBRIDEMENT, SKIN, SUB-Q TISSUE,=<20 SQ CM: ICD-10-PCS | Mod: HCNC,,, | Performed by: PODIATRIST

## 2023-01-10 PROCEDURE — 1160F PR REVIEW ALL MEDS BY PRESCRIBER/CLIN PHARMACIST DOCUMENTED: ICD-10-PCS | Mod: HCNC,CPTII,, | Performed by: PODIATRIST

## 2023-01-10 PROCEDURE — 3008F PR BODY MASS INDEX (BMI) DOCUMENTED: ICD-10-PCS | Mod: HCNC,CPTII,, | Performed by: PODIATRIST

## 2023-01-10 PROCEDURE — 99499 UNLISTED E&M SERVICE: CPT | Mod: ,,, | Performed by: PODIATRIST

## 2023-01-10 PROCEDURE — 99499 RISK ADDL DX/OHS AUDIT: ICD-10-PCS | Mod: ,,, | Performed by: PODIATRIST

## 2023-01-10 PROCEDURE — 11042 DBRDMT SUBQ TIS 1ST 20SQCM/<: CPT | Mod: HCNC,,, | Performed by: PODIATRIST

## 2023-01-10 PROCEDURE — 3008F BODY MASS INDEX DOCD: CPT | Mod: HCNC,CPTII,, | Performed by: PODIATRIST

## 2023-01-10 PROCEDURE — 1159F MED LIST DOCD IN RCRD: CPT | Mod: HCNC,CPTII,, | Performed by: PODIATRIST

## 2023-01-10 NOTE — PROGRESS NOTES
[address]  [place] [address]  Phone: [phone]   [phone]    Patient's PCP:Cristina Ren MD  Referring Provider: Aaareferral Self    Subjective:      Chief Complaint:: Wound Care, Diabetes, Non-healing Wound, Venous Stasis, Venous Ulcer (ight anterior superior lower leg wound and right anterior inferior lower leg wound./), Wound Check, and Leg Swelling    HPI    Patient presents for follow-up of right anterior superior lower leg wound and right anterior inferior lower leg wound.     Steff Johnson is a 62 y.o. female who presents today with a complaint of right lower leg venous ulcers lasting for approximately 1 week.  See Wound docs for full assessment and evaluation all right lower leg venous ulcers present.     1. Debridement of right anterior superior lower leg wound.  Assessment and evaluation of right anterior inferior lower leg wound- now healed See Wound Docs for assessment of wounds and procedure notes  2. Continue taking all medications as prescribed  3. Continue home health dressing changes  4. RTC one week   5. Counseled patient on increasing protein intake, not getting wound wet, keeping dressing clean dry and intact, following a healthy diet, elevating legs when able, removing pressure from wound     Total time spent for E&M 35  Total time for debridement 35 minutes            Counseling/Education:  I provided patient education verbally regarding:   The aspects of diabetes and how it pertains to the feet. I explained the importance of proper diabetic foot care and how it is essential for the health of their feet.     I discussed the importance of knowing their Hemoglobin A1c and that the level needs to be as close to 6 as possible. I discussed the increase complications of high blood sugar including stroke, blindness, heart attack, kidney failure and loss of limb secondary to neuropathy and PVD.      With neuropathy, beware of any breaks in the skin or redness. These  areas are not recognized early due to the numbness.     I discussed Diabetes, lower back issues, metabolic disorders, systemic causes, chemotherapy, vitamin deficiency, heavy metal exposure, as some of the causes. I also explained that as much as 40% of the time we can not find a cause. I discussed different treatments available to control the symptoms but which may not cure the problem.         Counseled patient on the aspects of diabetes and how it pertains to the feet.  I explained the importance of proper diabetic foot care and how it is essential for the health of their feet.        Shoe inspection. Patient instructed on proper foot hygeine. We discussed wearing proper shoe gear, daily foot inspections, never walking without protective shoe gear, never putting sharp instruments to feet, routine podiatric nail visits every 2-3 months.          I counseled the patient on their conditions, their implications and medical management.     >50% of this > 60 minute visit was spent face to face educating/counseling the patient     I spent a total of 60 minutes on the day of the visit.This includes face to face time and non-face to face time preparing to see the patient (eg, review of tests), obtaining and/or reviewing separately obtained history, documenting clinical information in the electronic or other health record, independently interpreting results and communicating results to the patient/family/caregiver, or care coordinator.        Patient should call the office immediately if any signs of infection, such as fever, chills, sweats, increased redness or pain.     Patient was instructed to call the clinic or go to the emergency department if their symptoms do not improve, worsens, or if new symptoms develop.  Patient was advised that if any increased swelling, pain, or numbness arise to go immediately to the ED. Patient knows to call any time if an emergency arises. Shared decision making occurred and patient  "verbalized understanding in agreement with this plan.      Much of the documentation for this visit was completed in the Wound Docs system.  Please see the attached documentation for further details about the patient's care. Scanned under the Media tab.         Dorys Crespo DPM        Vitals:    01/10/23 1348   Weight: 59 kg (130 lb)   Height: 4' 11" (1.499 m)      Shoe Size:     Past Surgical History:   Procedure Laterality Date    COLONOSCOPY N/A 4/11/2017    Procedure: COLONOSCOPY;  Surgeon: Jared Antonio MD;  Location: [facility];  Service: Endoscopy;  Laterality: N/A;    HYSTERECTOMY      OOPHORECTOMY      SHOULDER SURGERY      R     Past Medical History:   Diagnosis Date    Arthritis     Complete tear of left rotator cuff 11/09/2017    COPD (chronic obstructive pulmonary disease)     COVID-19 infection 07/02/2021    Diabetes mellitus     H/o Cerebrovascular accident (CVA) of left pontine structure 12/12/2019    Hypertension     Personal history of colonic polyps     Stroke     Wears glasses      Family History   Problem Relation Age of Onset    Diabetes Mother     Asthma Mother     Hypertension Mother     Diabetes Father     Diabetes Brother     Hypertension Brother     Anemia Daughter     Glaucoma Neg Hx     Macular degeneration Neg Hx     Retinal detachment Neg Hx         Social History:   Marital Status: Single  Alcohol History:  reports no history of alcohol use.  Tobacco History:  reports that she has never smoked. She has never used smokeless tobacco.  Drug History:  reports no history of drug use.    Review of patient's allergies indicates:  No Known Allergies    Current Outpatient Medications   Medication Sig Dispense Refill    atorvastatin (LIPITOR) 40 MG tablet Take 1 tablet (40 mg total) by mouth once daily. 90 tablet 1    blood glucose control, low Soln Use as directed (Patient taking differently: 1 each by Misc.(Non-Drug; Combo Route) route. Use as directed) 1 each 1    blood sugar diagnostic " "(TRUE METRIX GLUCOSE TEST STRIP) Strp Test blood sugar twice daily 200 strip 3    brimonidine 0.2% (ALPHAGAN) 0.2 % Drop Place 1 drop into both eyes 3 (three) times daily. 15 mL 3    cloNIDine 0.1 mg/24 hr td ptwk (CATAPRES) 0.1 mg/24 hr Place 1 patch onto the skin every 7 days. 12 patch 1    clopidogreL (PLAVIX) 75 mg tablet Take 1 tablet (75 mg total) by mouth once daily. 90 tablet 1    ergocalciferol (ERGOCALCIFEROL) 50,000 unit Cap Take 1 capsule (50,000 Units total) by mouth every 7 days. 12 capsule 1    hydrALAZINE (APRESOLINE) 100 MG tablet Take 1 tablet (100 mg total) by mouth 3 (three) times daily. 270 tablet 1    HYDROcodone-acetaminophen (NORCO)  mg per tablet       insulin aspart U-100 (NOVOLOG FLEXPEN U-100 INSULIN) 100 unit/mL (3 mL) InPn pen Inject 14 Units subcutaneously into the skin 3 (three) times daily before meals. 45 mL 3    insulin degludec (TRESIBA FLEXTOUCH U-100) 100 unit/mL (3 mL) insulin pen ADMINISTER 40 UNITS UNDER THE SKIN EVERY EVENING 12 pen 1    lancets Misc 1 lancet by Misc.(Non-Drug; Combo Route) route 2 (two) times a day. To check BG 2 times daily, to use with insurance preferred meter 200 each 3    metoprolol succinate (TOPROL-XL) 100 MG 24 hr tablet Take 1 tablet (100 mg total) by mouth once daily. 90 tablet 1    pen needle, diabetic (BD ULTRA-FINE SHORT PEN NEEDLE) 31 gauge x 5/16" Ndle 1 pen by Misc.(Non-Drug; Combo Route) route 4 (four) times daily. 300 each 5    tiotropium (SPIRIVA) 18 mcg inhalation capsule Inhale 1 capsule (18 mcg total) into the lungs once daily. Controller 90 capsule 3    valsartan-hydrochlorothiazide (DIOVAN-HCT) 160-25 mg per tablet Take 1 tablet by mouth once daily. 90 tablet 1     No current facility-administered medications for this visit.       Review of Systems   Constitutional:  Negative for chills, fatigue, fever and unexpected weight change.   HENT:  Negative for hearing loss and trouble swallowing.    Eyes:  Negative for photophobia and " visual disturbance.   Respiratory:  Negative for cough, shortness of breath and wheezing.    Cardiovascular:  Positive for leg swelling. Negative for chest pain and palpitations.   Gastrointestinal:  Negative for abdominal pain and nausea.   Genitourinary:  Negative for dysuria and frequency.   Musculoskeletal:  Negative for arthralgias, back pain, gait problem, joint swelling and myalgias.   Skin:  Positive for wound. Negative for rash.   Neurological:  Negative for tremors, seizures, weakness, numbness and headaches.   Hematological:  Does not bruise/bleed easily.       Objective:        Physical Exam:   Foot Exam  Physical Exam  Ortho/SPM Exam     Imaging:            Assessment:       1. Non-pressure chronic ulcer of right lower leg with fat layer exposed    2. 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 At high risk for inadequate nutritional intake    4. Cellulitis and abscess of leg    5. Venous stasis ulcer of other part of right lower leg with necrosis of muscle, unspecified whether varicose veins present    6. Bilateral lower extremity edema    7. Type 2 diabetes mellitus with diabetic nephropathy, with long-term current use of insulin    8. Type 2 diabetes mellitus with hyperglycemia, unspecified whether long term insulin use    9. Type 2 diabetes mellitus with both eyes affected by moderate nonproliferative retinopathy and macular edema, with long-term current use of insulin    10. Venous stasis ulcer of other part of right lower leg with fat layer exposed, unspecified whether varicose veins present      Plan:   Non-pressure chronic ulcer of right lower leg with fat layer exposed    Non-pressure chronic ulcer of right lower leg with necrosis of muscle    At high risk for inadequate nutritional intake    Cellulitis and abscess of leg    Venous stasis ulcer of other part of right lower leg with necrosis of muscle, unspecified whether varicose veins present    Bilateral lower  extremity edema    Type 2 diabetes mellitus with diabetic nephropathy, with long-term current use of insulin    Type 2 diabetes mellitus with hyperglycemia, unspecified whether long term insulin use    Type 2 diabetes mellitus with both eyes affected by moderate nonproliferative retinopathy and macular edema, with long-term current use of insulin    Venous stasis ulcer of other part of right lower leg with fat layer exposed, unspecified whether varicose veins present      Follow up in about 1 week (around 1/17/2023).    Procedures          Counseling:     I provided patient education verbally regarding:   Patient diagnosis, treatment options, as well as alternatives, risks, and benefits.     This note was created using Dragon voice recognition software that occasionally misinterpreted phrases or words.

## 2023-01-12 ENCOUNTER — OFFICE VISIT (OUTPATIENT)
Dept: FAMILY MEDICINE | Facility: CLINIC | Age: 63
End: 2023-01-12
Payer: MEDICARE

## 2023-01-12 VITALS
BODY MASS INDEX: 28.58 KG/M2 | SYSTOLIC BLOOD PRESSURE: 144 MMHG | HEART RATE: 70 BPM | OXYGEN SATURATION: 97 % | DIASTOLIC BLOOD PRESSURE: 82 MMHG | HEIGHT: 59 IN | TEMPERATURE: 98 F | WEIGHT: 141.75 LBS

## 2023-01-12 DIAGNOSIS — J44.9 CHRONIC OBSTRUCTIVE PULMONARY DISEASE, UNSPECIFIED COPD TYPE: ICD-10-CM

## 2023-01-12 DIAGNOSIS — E78.5 HYPERLIPIDEMIA ASSOCIATED WITH TYPE 2 DIABETES MELLITUS: ICD-10-CM

## 2023-01-12 DIAGNOSIS — R41.3 MEMORY DIFFICULTIES: ICD-10-CM

## 2023-01-12 DIAGNOSIS — E11.69 HYPERLIPIDEMIA ASSOCIATED WITH TYPE 2 DIABETES MELLITUS: ICD-10-CM

## 2023-01-12 DIAGNOSIS — Z79.4 TYPE 2 DIABETES MELLITUS WITH BOTH EYES AFFECTED BY MODERATE NONPROLIFERATIVE RETINOPATHY AND MACULAR EDEMA, WITH LONG-TERM CURRENT USE OF INSULIN: ICD-10-CM

## 2023-01-12 DIAGNOSIS — Z00.00 ENCOUNTER FOR PREVENTIVE HEALTH EXAMINATION: ICD-10-CM

## 2023-01-12 DIAGNOSIS — I77.1 TORTUOUS AORTA: ICD-10-CM

## 2023-01-12 DIAGNOSIS — Z78.0 ASYMPTOMATIC POSTMENOPAUSAL STATE: ICD-10-CM

## 2023-01-12 DIAGNOSIS — Z23 NEED FOR VACCINATION: Primary | ICD-10-CM

## 2023-01-12 DIAGNOSIS — E11.3313 TYPE 2 DIABETES MELLITUS WITH BOTH EYES AFFECTED BY MODERATE NONPROLIFERATIVE RETINOPATHY AND MACULAR EDEMA, WITH LONG-TERM CURRENT USE OF INSULIN: ICD-10-CM

## 2023-01-12 DIAGNOSIS — N18.32 STAGE 3B CHRONIC KIDNEY DISEASE: ICD-10-CM

## 2023-01-12 DIAGNOSIS — Z12.11 SCREENING FOR MALIGNANT NEOPLASM OF COLON: ICD-10-CM

## 2023-01-12 DIAGNOSIS — E11.59 HYPERTENSION ASSOCIATED WITH DIABETES: ICD-10-CM

## 2023-01-12 DIAGNOSIS — I15.2 HYPERTENSION ASSOCIATED WITH DIABETES: ICD-10-CM

## 2023-01-12 PROCEDURE — 3008F BODY MASS INDEX DOCD: CPT | Mod: HCNC,CPTII,S$GLB, | Performed by: NURSE PRACTITIONER

## 2023-01-12 PROCEDURE — 1159F PR MEDICATION LIST DOCUMENTED IN MEDICAL RECORD: ICD-10-PCS | Mod: HCNC,CPTII,S$GLB, | Performed by: NURSE PRACTITIONER

## 2023-01-12 PROCEDURE — 1101F PR PT FALLS ASSESS DOC 0-1 FALLS W/OUT INJ PAST YR: ICD-10-PCS | Mod: HCNC,CPTII,S$GLB, | Performed by: NURSE PRACTITIONER

## 2023-01-12 PROCEDURE — 1101F PT FALLS ASSESS-DOCD LE1/YR: CPT | Mod: HCNC,CPTII,S$GLB, | Performed by: NURSE PRACTITIONER

## 2023-01-12 PROCEDURE — 3079F PR MOST RECENT DIASTOLIC BLOOD PRESSURE 80-89 MM HG: ICD-10-PCS | Mod: HCNC,CPTII,S$GLB, | Performed by: NURSE PRACTITIONER

## 2023-01-12 PROCEDURE — 3079F DIAST BP 80-89 MM HG: CPT | Mod: HCNC,CPTII,S$GLB, | Performed by: NURSE PRACTITIONER

## 2023-01-12 PROCEDURE — 3288F FALL RISK ASSESSMENT DOCD: CPT | Mod: HCNC,CPTII,S$GLB, | Performed by: NURSE PRACTITIONER

## 2023-01-12 PROCEDURE — G0402 INITIAL PREVENTIVE EXAM: HCPCS | Mod: HCNC,S$GLB,, | Performed by: NURSE PRACTITIONER

## 2023-01-12 PROCEDURE — G0009 ADMIN PNEUMOCOCCAL VACCINE: HCPCS | Mod: HCNC,S$GLB,, | Performed by: NURSE PRACTITIONER

## 2023-01-12 PROCEDURE — 1160F PR REVIEW ALL MEDS BY PRESCRIBER/CLIN PHARMACIST DOCUMENTED: ICD-10-PCS | Mod: HCNC,CPTII,S$GLB, | Performed by: NURSE PRACTITIONER

## 2023-01-12 PROCEDURE — G9919 PR SCREENING AND POSITIVE: ICD-10-PCS | Mod: HCNC,CPTII,S$GLB, | Performed by: NURSE PRACTITIONER

## 2023-01-12 PROCEDURE — 99999 PR PBB SHADOW E&M-EST. PATIENT-LVL V: CPT | Mod: PBBFAC,HCNC,, | Performed by: NURSE PRACTITIONER

## 2023-01-12 PROCEDURE — G0402 PR WELCOME MEDICARE PREVENTIVE VISIT NEW ENROLLEE: ICD-10-PCS | Mod: HCNC,S$GLB,, | Performed by: NURSE PRACTITIONER

## 2023-01-12 PROCEDURE — G0009 PNEUMOCOCCAL CONJUGATE VACCINE 20-VALENT: ICD-10-PCS | Mod: HCNC,S$GLB,, | Performed by: NURSE PRACTITIONER

## 2023-01-12 PROCEDURE — 1160F RVW MEDS BY RX/DR IN RCRD: CPT | Mod: HCNC,CPTII,S$GLB, | Performed by: NURSE PRACTITIONER

## 2023-01-12 PROCEDURE — G9919 SCRN ND POS ND PROV OF REC: HCPCS | Mod: HCNC,CPTII,S$GLB, | Performed by: NURSE PRACTITIONER

## 2023-01-12 PROCEDURE — 90677 PNEUMOCOCCAL CONJUGATE VACCINE 20-VALENT: ICD-10-PCS | Mod: HCNC,S$GLB,, | Performed by: NURSE PRACTITIONER

## 2023-01-12 PROCEDURE — 3077F PR MOST RECENT SYSTOLIC BLOOD PRESSURE >= 140 MM HG: ICD-10-PCS | Mod: HCNC,CPTII,S$GLB, | Performed by: NURSE PRACTITIONER

## 2023-01-12 PROCEDURE — 99999 PR PBB SHADOW E&M-EST. PATIENT-LVL V: ICD-10-PCS | Mod: PBBFAC,HCNC,, | Performed by: NURSE PRACTITIONER

## 2023-01-12 PROCEDURE — 3288F PR FALLS RISK ASSESSMENT DOCUMENTED: ICD-10-PCS | Mod: HCNC,CPTII,S$GLB, | Performed by: NURSE PRACTITIONER

## 2023-01-12 PROCEDURE — 3008F PR BODY MASS INDEX (BMI) DOCUMENTED: ICD-10-PCS | Mod: HCNC,CPTII,S$GLB, | Performed by: NURSE PRACTITIONER

## 2023-01-12 PROCEDURE — 1159F MED LIST DOCD IN RCRD: CPT | Mod: HCNC,CPTII,S$GLB, | Performed by: NURSE PRACTITIONER

## 2023-01-12 PROCEDURE — 90677 PCV20 VACCINE IM: CPT | Mod: HCNC,S$GLB,, | Performed by: NURSE PRACTITIONER

## 2023-01-12 PROCEDURE — 3077F SYST BP >= 140 MM HG: CPT | Mod: HCNC,CPTII,S$GLB, | Performed by: NURSE PRACTITIONER

## 2023-01-12 NOTE — PATIENT INSTRUCTIONS
Counseling and Referral of Other Preventative  (Italic type indicates deductible and co-insurance are waived)    Patient Name: Steff Johnson  Today's Date: 1/12/2023    Health Maintenance       Date Due Completion Date    DEXA Scan Never done ---    Shingles Vaccine (1 of 2) Never done ---    Pneumococcal Vaccines (Age 65+) (2 - PCV) 01/27/2017 1/27/2016    Diabetes Urine Screening 07/03/2020 7/3/2019    Mammogram 03/09/2021 3/9/2020    COVID-19 Vaccine (5 - Booster) 10/19/2021 8/24/2021    Colorectal Cancer Screening 04/11/2022 4/11/2017    Eye Exam 12/07/2022 12/7/2021    Override on 6/1/2015: Done ([facility] sent to scanning)    Hemoglobin A1c 03/10/2023 12/10/2022    Foot Exam 12/07/2023 12/7/2022 (Done)    Override on 12/7/2022: Done    Override on 12/3/2020: Done (CARON MOLINA, BRY, NP)    Override on 11/6/2019: Done    Lipid Panel 12/10/2023 12/10/2022    High Dose Statin 12/21/2023 12/21/2022    TETANUS VACCINE 12/01/2032 12/1/2022        Orders Placed This Encounter   Procedures    DXA Bone Density Spine And Hip    (In Office Administered) Pneumococcal Conjugate Vaccine (20 Valent) (IM)     The following information is provided to all patients.  This information is to help you find resources for any of the problems found today that may be affecting your health:                Living healthy guide: www.h.[place].gov      Understanding Diabetes: www.diabetes.org      Eating healthy: www.cdc.gov/healthyweight      CDC home safety checklist: www.cdc.gov/steadi/patient.html      Agency on Aging: www.goea.[place].gov      Alcoholics anonymous (AA): www.aa.org      Physical Activity: www.jani.nih.gov/ve1bfdr      Tobacco use: www.quitwithusla.org

## 2023-01-12 NOTE — PROGRESS NOTES
"Steff Johnson presented for a  Medicare AWV and comprehensive Health Risk Assessment today. The following components were reviewed and updated:    Medical history  Family History  Social history  Allergies and Current Medications  Health Risk Assessment  Health Maintenance  Care Team     Patient screened moderate and/or high risk for one or more social determinants of health (SDOH). Patient connected to community resources through the ED Navigator.      ** See Completed Assessments for Annual Wellness Visit within the encounter summary.**         The following assessments were completed:  Living Situation  CAGE  Depression Screening  Timed Get Up and Go  Whisper Test  Cognitive Function Screening  Nutrition Screening  ADL Screening  PAQ Screening          Vitals:    01/12/23 1252 01/12/23 1341   BP: (!) 162/94 (!) 144/82   Pulse: 70    Temp: 98 °F (36.7 °C)    TempSrc: Oral    SpO2: 97%    Weight: 64.3 kg (141 lb 12.1 oz)    Height: 4' 11" (1.499 m)      Body mass index is 28.63 kg/m².  Physical Exam  Constitutional:       Appearance: She is well-developed.   HENT:      Head: Normocephalic and atraumatic.      Nose: Nose normal.   Eyes:      General: Lids are normal.      Conjunctiva/sclera: Conjunctivae normal.      Pupils: Pupils are equal, round, and reactive to light.   Cardiovascular:      Rate and Rhythm: Normal rate.   Pulmonary:      Effort: Pulmonary effort is normal.   Musculoskeletal:      Cervical back: Full passive range of motion without pain.   Skin:     General: Skin is warm and dry.   Neurological:      Mental Status: She is alert and oriented to person, place, and time.   Psychiatric:         Speech: Speech normal.         Behavior: Behavior normal.             Diagnoses and health risks identified today and associated recommendations/orders:    1. Encounter for preventive health examination  Discussed health maintenance guidelines appropriate for age.  Review for Opioid Screening: Patient does " have rx for Opioids- short term, for arm/shoulder pain.  Risk score 0    Review for Substance Use Disorders: Patient does not use substance.      2. Need for vaccination    - (In Office Administered) Pneumococcal Conjugate Vaccine (20 Valent) (IM)    3. Asymptomatic postmenopausal state    - DXA Bone Density Spine And Hip; Future    4. Hypertension associated with diabetes  Uncontrolled  Patient's friend at with her reports elevated blood pressures most likely related to the pain that she is constantly in due to her shoulder problems  It did come down a good bit on repeat   She also has not taken her medication yet today  Reinforced low-sodium diet  Has close follow-up scheduled with PCP team on January 20     5. Hyperlipidemia associated with type 2 diabetes mellitus  Controlled, continue current medication regimen  Patient taking statin  Followed by pcp      6. Tortuous aorta  Stable, continue to monitor    7. Chronic obstructive pulmonary disease, unspecified COPD type  Stable, continue current medication  No current acute symptoms    8. Stage 3b chronic kidney disease  Stable, continue to monitor    9. Type 2 diabetes mellitus with both eyes affected by moderate nonproliferative retinopathy and macular edema, with long-term current use of insulin  , uncontrolled last hemoglobin A1c 8.6  Continue current medication, followed by PCP  ADA diet    10.Memory difficulties  -Mini cog score 3/5  Patient would like further testing, refer to neurology        Provided Steff with a 5-10 year written screening schedule and personal prevention plan. Recommendations were developed using the USPSTF age appropriate recommendations. Education, counseling, and referrals were provided as needed. After Visit Summary printed and given to patient which includes a list of additional screenings\tests needed.    Follow up for One year for Annual Wellness Visit.    Michelle Escudero NP  I offered to discuss advanced care planning,  including how to pick a person who would make decisions for you if you were unable to make them for yourself, called a health care power of , and what kind of decisions you might make such as use of life sustaining treatments such as ventilators and tube feeding when faced with a life limiting illness recorded on a living will that they will need to know. (How you want to be cared for as you near the end of your natural life)     X Patient is interested in learning more about how to make advanced directives.  I provided them paperwork and offered to discuss this with them.

## 2023-01-12 NOTE — PROGRESS NOTES
Identified pts name and , Pneumococcal Conjugate Vaccine (20 Valent) vaccine administered IM, pt tolerated well. Aseptic technique maintained. Pain scale 0 out of 10 on pain scale. Pt instructed to wait in clinic for 15 minutes after injection was given.

## 2023-01-13 ENCOUNTER — PATIENT MESSAGE (OUTPATIENT)
Dept: GASTROENTEROLOGY | Facility: CLINIC | Age: 63
End: 2023-01-13
Payer: MEDICARE

## 2023-01-13 ENCOUNTER — TELEPHONE (OUTPATIENT)
Dept: GASTROENTEROLOGY | Facility: CLINIC | Age: 63
End: 2023-01-13
Payer: MEDICARE

## 2023-01-17 ENCOUNTER — PATIENT MESSAGE (OUTPATIENT)
Dept: ADMINISTRATIVE | Facility: HOSPITAL | Age: 63
End: 2023-01-17
Payer: MEDICARE

## 2023-01-17 ENCOUNTER — OFFICE VISIT (OUTPATIENT)
Dept: WOUND CARE | Facility: HOSPITAL | Age: 63
End: 2023-01-17
Attending: PODIATRIST
Payer: MEDICARE

## 2023-01-17 VITALS
SYSTOLIC BLOOD PRESSURE: 165 MMHG | HEART RATE: 69 BPM | DIASTOLIC BLOOD PRESSURE: 83 MMHG | RESPIRATION RATE: 18 BRPM | TEMPERATURE: 98 F

## 2023-01-17 DIAGNOSIS — Z79.4 TYPE 2 DIABETES MELLITUS WITH BOTH EYES AFFECTED BY MODERATE NONPROLIFERATIVE RETINOPATHY AND MACULAR EDEMA, WITH LONG-TERM CURRENT USE OF INSULIN: ICD-10-CM

## 2023-01-17 DIAGNOSIS — R60.0 BILATERAL LOWER EXTREMITY EDEMA: ICD-10-CM

## 2023-01-17 DIAGNOSIS — Z79.4 TYPE 2 DIABETES MELLITUS WITH DIABETIC NEPHROPATHY, WITH LONG-TERM CURRENT USE OF INSULIN: ICD-10-CM

## 2023-01-17 DIAGNOSIS — E11.9 ENCOUNTER FOR DIABETIC FOOT EXAM: ICD-10-CM

## 2023-01-17 DIAGNOSIS — I83.018 VENOUS STASIS ULCER OF OTHER PART OF RIGHT LOWER LEG WITH NECROSIS OF MUSCLE, UNSPECIFIED WHETHER VARICOSE VEINS PRESENT: ICD-10-CM

## 2023-01-17 DIAGNOSIS — E11.21 TYPE 2 DIABETES MELLITUS WITH DIABETIC NEPHROPATHY, WITH LONG-TERM CURRENT USE OF INSULIN: ICD-10-CM

## 2023-01-17 DIAGNOSIS — Z91.89 AT HIGH RISK FOR INADEQUATE NUTRITIONAL INTAKE: ICD-10-CM

## 2023-01-17 DIAGNOSIS — L97.912 NON-PRESSURE CHRONIC ULCER OF RIGHT LOWER LEG WITH FAT LAYER EXPOSED: Primary | ICD-10-CM

## 2023-01-17 DIAGNOSIS — L97.913 NON-PRESSURE CHRONIC ULCER OF RIGHT LOWER LEG WITH NECROSIS OF MUSCLE: ICD-10-CM

## 2023-01-17 DIAGNOSIS — E11.65 TYPE 2 DIABETES MELLITUS WITH HYPERGLYCEMIA, UNSPECIFIED WHETHER LONG TERM INSULIN USE: ICD-10-CM

## 2023-01-17 DIAGNOSIS — L97.813 VENOUS STASIS ULCER OF OTHER PART OF RIGHT LOWER LEG WITH NECROSIS OF MUSCLE, UNSPECIFIED WHETHER VARICOSE VEINS PRESENT: ICD-10-CM

## 2023-01-17 DIAGNOSIS — L97.812 VENOUS STASIS ULCER OF OTHER PART OF RIGHT LOWER LEG WITH FAT LAYER EXPOSED, UNSPECIFIED WHETHER VARICOSE VEINS PRESENT: ICD-10-CM

## 2023-01-17 DIAGNOSIS — E11.3313 TYPE 2 DIABETES MELLITUS WITH BOTH EYES AFFECTED BY MODERATE NONPROLIFERATIVE RETINOPATHY AND MACULAR EDEMA, WITH LONG-TERM CURRENT USE OF INSULIN: ICD-10-CM

## 2023-01-17 DIAGNOSIS — E11.9 COMPREHENSIVE DIABETIC FOOT EXAMINATION, TYPE 2 DM, ENCOUNTER FOR: ICD-10-CM

## 2023-01-17 DIAGNOSIS — I83.018 VENOUS STASIS ULCER OF OTHER PART OF RIGHT LOWER LEG WITH FAT LAYER EXPOSED, UNSPECIFIED WHETHER VARICOSE VEINS PRESENT: ICD-10-CM

## 2023-01-17 PROCEDURE — 3077F PR MOST RECENT SYSTOLIC BLOOD PRESSURE >= 140 MM HG: ICD-10-PCS | Mod: HCNC,CPTII,, | Performed by: PODIATRIST

## 2023-01-17 PROCEDURE — 99499 RISK ADDL DX/OHS AUDIT: ICD-10-PCS | Mod: ,,, | Performed by: PODIATRIST

## 2023-01-17 PROCEDURE — 3079F DIAST BP 80-89 MM HG: CPT | Mod: HCNC,CPTII,, | Performed by: PODIATRIST

## 2023-01-17 PROCEDURE — 1160F RVW MEDS BY RX/DR IN RCRD: CPT | Mod: HCNC,CPTII,, | Performed by: PODIATRIST

## 2023-01-17 PROCEDURE — 99214 PR OFFICE/OUTPT VISIT, EST, LEVL IV, 30-39 MIN: ICD-10-PCS | Mod: 25,HCNC,, | Performed by: PODIATRIST

## 2023-01-17 PROCEDURE — 3077F SYST BP >= 140 MM HG: CPT | Mod: HCNC,CPTII,, | Performed by: PODIATRIST

## 2023-01-17 PROCEDURE — 4010F ACE/ARB THERAPY RXD/TAKEN: CPT | Mod: HCNC,CPTII,, | Performed by: PODIATRIST

## 2023-01-17 PROCEDURE — 3079F PR MOST RECENT DIASTOLIC BLOOD PRESSURE 80-89 MM HG: ICD-10-PCS | Mod: HCNC,CPTII,, | Performed by: PODIATRIST

## 2023-01-17 PROCEDURE — 11042 PR DEBRIDEMENT, SKIN, SUB-Q TISSUE,=<20 SQ CM: ICD-10-PCS | Mod: HCNC,,, | Performed by: PODIATRIST

## 2023-01-17 PROCEDURE — 99499 UNLISTED E&M SERVICE: CPT | Mod: ,,, | Performed by: PODIATRIST

## 2023-01-17 PROCEDURE — 99214 OFFICE O/P EST MOD 30 MIN: CPT | Mod: 25,HCNC,, | Performed by: PODIATRIST

## 2023-01-17 PROCEDURE — 1159F MED LIST DOCD IN RCRD: CPT | Mod: HCNC,CPTII,, | Performed by: PODIATRIST

## 2023-01-17 PROCEDURE — 4010F PR ACE/ARB THEARPY RXD/TAKEN: ICD-10-PCS | Mod: HCNC,CPTII,, | Performed by: PODIATRIST

## 2023-01-17 PROCEDURE — 11042 DBRDMT SUBQ TIS 1ST 20SQCM/<: CPT | Mod: HCNC,,, | Performed by: PODIATRIST

## 2023-01-17 PROCEDURE — 1159F PR MEDICATION LIST DOCUMENTED IN MEDICAL RECORD: ICD-10-PCS | Mod: HCNC,CPTII,, | Performed by: PODIATRIST

## 2023-01-17 PROCEDURE — 1160F PR REVIEW ALL MEDS BY PRESCRIBER/CLIN PHARMACIST DOCUMENTED: ICD-10-PCS | Mod: HCNC,CPTII,, | Performed by: PODIATRIST

## 2023-01-17 PROCEDURE — 11042 DBRDMT SUBQ TIS 1ST 20SQCM/<: CPT | Mod: HCNC | Performed by: PODIATRIST

## 2023-01-18 ENCOUNTER — OFFICE VISIT (OUTPATIENT)
Dept: OPHTHALMOLOGY | Facility: CLINIC | Age: 63
End: 2023-01-18
Payer: MEDICARE

## 2023-01-18 DIAGNOSIS — H25.813 COMBINED FORMS OF AGE-RELATED CATARACT, BILATERAL: ICD-10-CM

## 2023-01-18 DIAGNOSIS — H40.053 OCULAR HYPERTENSION, BILATERAL: ICD-10-CM

## 2023-01-18 DIAGNOSIS — E11.3313 TYPE 2 DIABETES MELLITUS WITH BOTH EYES AFFECTED BY MODERATE NONPROLIFERATIVE RETINOPATHY AND MACULAR EDEMA, WITH LONG-TERM CURRENT USE OF INSULIN: Primary | ICD-10-CM

## 2023-01-18 DIAGNOSIS — Z79.4 TYPE 2 DIABETES MELLITUS WITH BOTH EYES AFFECTED BY MODERATE NONPROLIFERATIVE RETINOPATHY AND MACULAR EDEMA, WITH LONG-TERM CURRENT USE OF INSULIN: Primary | ICD-10-CM

## 2023-01-18 PROCEDURE — 1160F PR REVIEW ALL MEDS BY PRESCRIBER/CLIN PHARMACIST DOCUMENTED: ICD-10-PCS | Mod: HCNC,CPTII,S$GLB, | Performed by: OPHTHALMOLOGY

## 2023-01-18 PROCEDURE — 1160F RVW MEDS BY RX/DR IN RCRD: CPT | Mod: HCNC,CPTII,S$GLB, | Performed by: OPHTHALMOLOGY

## 2023-01-18 PROCEDURE — 1159F MED LIST DOCD IN RCRD: CPT | Mod: HCNC,CPTII,S$GLB, | Performed by: OPHTHALMOLOGY

## 2023-01-18 PROCEDURE — 99214 OFFICE O/P EST MOD 30 MIN: CPT | Mod: HCNC,S$GLB,, | Performed by: OPHTHALMOLOGY

## 2023-01-18 PROCEDURE — 92134 OCT, RETINA - OU - BOTH EYES: ICD-10-PCS | Mod: HCNC,S$GLB,, | Performed by: OPHTHALMOLOGY

## 2023-01-18 PROCEDURE — 99999 PR PBB SHADOW E&M-EST. PATIENT-LVL III: CPT | Mod: PBBFAC,HCNC,, | Performed by: OPHTHALMOLOGY

## 2023-01-18 PROCEDURE — 4010F PR ACE/ARB THEARPY RXD/TAKEN: ICD-10-PCS | Mod: HCNC,CPTII,S$GLB, | Performed by: OPHTHALMOLOGY

## 2023-01-18 PROCEDURE — 4010F ACE/ARB THERAPY RXD/TAKEN: CPT | Mod: HCNC,CPTII,S$GLB, | Performed by: OPHTHALMOLOGY

## 2023-01-18 PROCEDURE — 3288F FALL RISK ASSESSMENT DOCD: CPT | Mod: HCNC,CPTII,S$GLB, | Performed by: OPHTHALMOLOGY

## 2023-01-18 PROCEDURE — 99214 PR OFFICE/OUTPT VISIT, EST, LEVL IV, 30-39 MIN: ICD-10-PCS | Mod: HCNC,S$GLB,, | Performed by: OPHTHALMOLOGY

## 2023-01-18 PROCEDURE — 99999 PR PBB SHADOW E&M-EST. PATIENT-LVL III: ICD-10-PCS | Mod: PBBFAC,HCNC,, | Performed by: OPHTHALMOLOGY

## 2023-01-18 PROCEDURE — 92134 CPTRZ OPH DX IMG PST SGM RTA: CPT | Mod: HCNC,S$GLB,, | Performed by: OPHTHALMOLOGY

## 2023-01-18 PROCEDURE — 1101F PT FALLS ASSESS-DOCD LE1/YR: CPT | Mod: HCNC,CPTII,S$GLB, | Performed by: OPHTHALMOLOGY

## 2023-01-18 PROCEDURE — 1159F PR MEDICATION LIST DOCUMENTED IN MEDICAL RECORD: ICD-10-PCS | Mod: HCNC,CPTII,S$GLB, | Performed by: OPHTHALMOLOGY

## 2023-01-18 PROCEDURE — 1101F PR PT FALLS ASSESS DOC 0-1 FALLS W/OUT INJ PAST YR: ICD-10-PCS | Mod: HCNC,CPTII,S$GLB, | Performed by: OPHTHALMOLOGY

## 2023-01-18 PROCEDURE — 3288F PR FALLS RISK ASSESSMENT DOCUMENTED: ICD-10-PCS | Mod: HCNC,CPTII,S$GLB, | Performed by: OPHTHALMOLOGY

## 2023-01-18 RX ORDER — LOSARTAN POTASSIUM 50 MG/1
50 TABLET ORAL
COMMUNITY
Start: 2022-12-20 | End: 2023-02-16 | Stop reason: SDUPTHER

## 2023-01-18 RX ORDER — LISINOPRIL 10 MG/1
1 TABLET ORAL EVERY MORNING
COMMUNITY
Start: 2022-02-25 | End: 2023-02-16

## 2023-01-18 NOTE — PROGRESS NOTES
HPI    DLS: 12/21/2022- possible IVO OD     Pt states no new complaints.     Brim OU BID- uses last Friday. States forgets to use it.      Last edited by Shannon Blank on 1/18/2023  2:50 PM.          A/P    ICD-10-CM ICD-9-CM   1. Type 2 diabetes mellitus with both eyes affected by moderate nonproliferative retinopathy and macular edema, with long-term current use of insulin  E11.3313 250.50    Z79.4 362.05     362.07     V58.67   2. Ocular hypertension, bilateral  H40.053 365.04   3. Combined forms of age-related cataract, bilateral  H25.813 366.19       1. Type 2 diabetes mellitus with both eyes affected by moderate nonproliferative retinopathy and macular edema, with long-term current use of insulin  PCP Cristina Ren MD  12/10/2022  8.6  A1C     Pt had recent strokes this month and last month, in wheelchair    OD: no injections or laser  VA 20/30 stable, stable foveal IRF, no NV  Plan: needs treatment for DME but given recent CVA will hold off on antiVEGF, recommend ODX but given elevated IOP will hold off on now and restart Brim but at TID freq (pt stopped using ?), will bring back in 2 weeks for ODX, stressed Brim TID compliance    OS: no injections or laser  VA 20/30 stable, better JF IRF good foveal contour  Plan: Observation closely    Recommend good blood pressure control, tight blood glucose control, and good cholesterol control     2. Ocular hypertension, bilateral  IOP 37/36, stopped using Brim BID  Last saw Dr Ambrose 12/2021, has appt in Feb  Restart Brim OU at TID  Stressed compliance    3. Combined forms of age-related cataract, bilateral  Mod NS, borderline VS  Plan: Observation, update Mrx     RTC Fiorella 2 weeks DFE/OCTm OU, ODX OD   RTC Arnol next avail for IOP check  RTC Ninh next avail Mrx     I saw and examined the patient and reviewed in detail the findings documented. The final examination findings, image interpretations, and plan as documented in the record represent my personal judgment and  conclusions.    Fito Barros MD  Vitreoretinal Surgery   [facility]

## 2023-01-19 NOTE — PROGRESS NOTES
[address]  [place] [address]  Phone: [phone]   [phone]    Patient's PCP:Cristina Ren MD  Referring Provider: Aaareferral Self    Subjective:      Chief Complaint:: Wound Care, Venous Ulcer, Venous Stasis, Diabetes, Wound Check, Leg Swelling, Non-healing Wound, and Diabetic Foot Exam    HPI      Patient presents for follow-up of right anterior superior lower leg wound    Additionally, patient presents for a diabetic foot exam.     Steff Johnson is a 62 y.o. female who presents today with a complaint of right lower leg venous ulcers lasting for approximately 1 week.  See Wound docs for full assessment and evaluation all right lower leg venous ulcers present.     1. Debridement of right anterior superior lower leg wound. See Wound Docs for assessment of wounds and procedure notes  2. Continue taking all medications as prescribed  3. Continue home health dressing changes  4. RTC one week   5. Counseled patient on increasing protein intake, not getting wound wet, keeping dressing clean dry and intact, following a healthy diet, elevating legs when able, removing pressure from wound     Total time spent for E&M 35  Total time for debridement 35 minutes            Counseling/Education:  I provided patient education verbally regarding:   The aspects of diabetes and how it pertains to the feet. I explained the importance of proper diabetic foot care and how it is essential for the health of their feet.     I discussed the importance of knowing their Hemoglobin A1c and that the level needs to be as close to 6 as possible. I discussed the increase complications of high blood sugar including stroke, blindness, heart attack, kidney failure and loss of limb secondary to neuropathy and PVD.      With neuropathy, beware of any breaks in the skin or redness. These areas are not recognized early due to the numbness.     I discussed Diabetes, lower back issues, metabolic disorders, systemic  causes, chemotherapy, vitamin deficiency, heavy metal exposure, as some of the causes. I also explained that as much as 40% of the time we can not find a cause. I discussed different treatments available to control the symptoms but which may not cure the problem.         Counseled patient on the aspects of diabetes and how it pertains to the feet.  I explained the importance of proper diabetic foot care and how it is essential for the health of their feet.        Shoe inspection. Patient instructed on proper foot hygeine. We discussed wearing proper shoe gear, daily foot inspections, never walking without protective shoe gear, never putting sharp instruments to feet, routine podiatric nail visits every 2-3 months.          I counseled the patient on their conditions, their implications and medical management.     >50% of this > 60 minute visit was spent face to face educating/counseling the patient     I spent a total of 60 minutes on the day of the visit.This includes face to face time and non-face to face time preparing to see the patient (eg, review of tests), obtaining and/or reviewing separately obtained history, documenting clinical information in the electronic or other health record, independently interpreting results and communicating results to the patient/family/caregiver, or care coordinator.        Patient should call the office immediately if any signs of infection, such as fever, chills, sweats, increased redness or pain.     Patient was instructed to call the clinic or go to the emergency department if their symptoms do not improve, worsens, or if new symptoms develop.  Patient was advised that if any increased swelling, pain, or numbness arise to go immediately to the ED. Patient knows to call any time if an emergency arises. Shared decision making occurred and patient verbalized understanding in agreement with this plan.      Much of the documentation for this visit was completed in the Wound Docs  system.  Please see the attached documentation for further details about the patient's care. Scanned under the Media tab.         Dorys Crespo DPM     Vitals:    01/17/23 1428   BP: (!) 165/83   Pulse: 69   Resp: 18   Temp: 98.3 °F (36.8 °C)   TempSrc: Temporal   PainSc: 0-No pain   PainLoc: Leg      Shoe Size:     Past Surgical History:   Procedure Laterality Date    COLONOSCOPY N/A 4/11/2017    Procedure: COLONOSCOPY;  Surgeon: Jared Antonio MD;  Location: [facility];  Service: Endoscopy;  Laterality: N/A;    HYSTERECTOMY      OOPHORECTOMY      SHOULDER SURGERY      R     Past Medical History:   Diagnosis Date    Arthritis     Complete tear of left rotator cuff 11/09/2017    COPD (chronic obstructive pulmonary disease)     COVID-19 infection 07/02/2021    Diabetes mellitus     H/o Cerebrovascular accident (CVA) of left pontine structure 12/12/2019    Hypertension     Personal history of colonic polyps     Stroke     Wears glasses      Family History   Problem Relation Age of Onset    Diabetes Mother     Asthma Mother     Hypertension Mother     Diabetes Father     Diabetes Brother     Hypertension Brother     Anemia Daughter     Glaucoma Neg Hx     Macular degeneration Neg Hx     Retinal detachment Neg Hx         Social History:   Marital Status: Single  Alcohol History:  reports no history of alcohol use.  Tobacco History:  reports that she has never smoked. She has never used smokeless tobacco.  Drug History:  reports no history of drug use.    Review of patient's allergies indicates:  No Known Allergies    Current Outpatient Medications   Medication Sig Dispense Refill    atorvastatin (LIPITOR) 40 MG tablet Take 1 tablet (40 mg total) by mouth once daily. 90 tablet 1    blood glucose control, low Soln Use as directed (Patient taking differently: 1 each by Misc.(Non-Drug; Combo Route) route. Use as directed) 1 each 1    blood sugar diagnostic (TRUE METRIX GLUCOSE TEST STRIP) Strp Test blood sugar twice  "daily 200 strip 3    brimonidine 0.2% (ALPHAGAN) 0.2 % Drop Place 1 drop into both eyes 3 (three) times daily. 15 mL 3    cloNIDine 0.1 mg/24 hr td ptwk (CATAPRES) 0.1 mg/24 hr Place 1 patch onto the skin every 7 days. 12 patch 1    clopidogreL (PLAVIX) 75 mg tablet Take 1 tablet (75 mg total) by mouth once daily. 90 tablet 1    ergocalciferol (ERGOCALCIFEROL) 50,000 unit Cap Take 1 capsule (50,000 Units total) by mouth every 7 days. 12 capsule 1    hydrALAZINE (APRESOLINE) 100 MG tablet Take 1 tablet (100 mg total) by mouth 3 (three) times daily. 270 tablet 1    HYDROcodone-acetaminophen (NORCO)  mg per tablet       insulin aspart U-100 (NOVOLOG FLEXPEN U-100 INSULIN) 100 unit/mL (3 mL) InPn pen Inject 14 Units subcutaneously into the skin 3 (three) times daily before meals. 45 mL 3    insulin degludec (TRESIBA FLEXTOUCH U-100) 100 unit/mL (3 mL) insulin pen ADMINISTER 40 UNITS UNDER THE SKIN EVERY EVENING 12 pen 1    lancets Misc 1 lancet by Misc.(Non-Drug; Combo Route) route 2 (two) times a day. To check BG 2 times daily, to use with insurance preferred meter 200 each 3    lisinopriL 10 MG tablet 1 tablet once daily.      losartan (COZAAR) 50 MG tablet Take 50 mg by mouth.      metoprolol succinate (TOPROL-XL) 100 MG 24 hr tablet Take 1 tablet (100 mg total) by mouth once daily. 90 tablet 1    pen needle, diabetic (BD ULTRA-FINE SHORT PEN NEEDLE) 31 gauge x 5/16" Ndle 1 pen by Misc.(Non-Drug; Combo Route) route 4 (four) times daily. 300 each 5    tiotropium (SPIRIVA) 18 mcg inhalation capsule Inhale 1 capsule (18 mcg total) into the lungs once daily. Controller 90 capsule 3    valsartan-hydrochlorothiazide (DIOVAN-HCT) 160-25 mg per tablet Take 1 tablet by mouth once daily. 90 tablet 1     No current facility-administered medications for this visit.       Review of Systems   Constitutional:  Negative for chills, fatigue, fever and unexpected weight change.   HENT:  Negative for hearing loss and trouble " swallowing.    Eyes:  Negative for photophobia and visual disturbance.   Respiratory:  Negative for cough, shortness of breath and wheezing.    Cardiovascular:  Positive for leg swelling. Negative for chest pain and palpitations.   Gastrointestinal:  Negative for abdominal pain and nausea.   Genitourinary:  Negative for dysuria and frequency.   Musculoskeletal:  Negative for arthralgias, back pain, gait problem, joint swelling and myalgias.   Skin:  Positive for wound. Negative for rash.   Neurological:  Negative for tremors, seizures, weakness, numbness and headaches.   Hematological:  Does not bruise/bleed easily.       Objective:        Physical Exam:   Foot Exam  Physical Exam  Ortho/SPM Exam     Imaging:            Assessment:       1. Non-pressure chronic ulcer of right lower leg with fat layer exposed    2. 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 At high risk for inadequate nutritional intake    4. Bilateral lower extremity edema    5. Venous stasis ulcer of other part of right lower leg with necrosis of muscle, unspecified whether varicose veins present    6. Type 2 diabetes mellitus with diabetic nephropathy, with long-term current use of insulin    7. Type 2 diabetes mellitus with hyperglycemia, unspecified whether long term insulin use    8. Type 2 diabetes mellitus with both eyes affected by moderate nonproliferative retinopathy and macular edema, with long-term current use of insulin    9. Venous stasis ulcer of other part of right lower leg with fat layer exposed, unspecified whether varicose veins present    10. Comprehensive diabetic foot examination, type 2 DM, encounter for    11. 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    At high risk for inadequate nutritional intake    Bilateral lower extremity edema    Venous  stasis ulcer of other part of right lower leg with necrosis of muscle, unspecified whether varicose veins present    Type 2 diabetes mellitus with diabetic nephropathy, with long-term current use of insulin    Type 2 diabetes mellitus with hyperglycemia, unspecified whether long term insulin use  -      DIABETES FOOT EXAM    Type 2 diabetes mellitus with both eyes affected by moderate nonproliferative retinopathy and macular edema, with long-term current use of insulin  -      DIABETES FOOT EXAM    Venous stasis ulcer of other part of right lower leg with fat layer exposed, unspecified whether varicose veins present    Comprehensive diabetic foot examination, type 2 DM, encounter for  -      DIABETES FOOT EXAM    Encounter for diabetic foot exam  -      DIABETES FOOT EXAM      Follow up in about 1 week (around 1/24/2023).    Procedures          Counseling:     I provided patient education verbally regarding:   Patient diagnosis, treatment options, as well as alternatives, risks, and benefits.     This note was created using Dragon voice recognition software that occasionally misinterpreted phrases or words.

## 2023-01-24 ENCOUNTER — OFFICE VISIT (OUTPATIENT)
Dept: WOUND CARE | Facility: HOSPITAL | Age: 63
End: 2023-01-24
Attending: PODIATRIST
Payer: MEDICARE

## 2023-01-24 VITALS
RESPIRATION RATE: 20 BRPM | HEART RATE: 84 BPM | SYSTOLIC BLOOD PRESSURE: 132 MMHG | DIASTOLIC BLOOD PRESSURE: 78 MMHG | TEMPERATURE: 98 F

## 2023-01-24 DIAGNOSIS — Z79.4 TYPE 2 DIABETES MELLITUS WITH BOTH EYES AFFECTED BY MODERATE NONPROLIFERATIVE RETINOPATHY AND MACULAR EDEMA, WITH LONG-TERM CURRENT USE OF INSULIN: ICD-10-CM

## 2023-01-24 DIAGNOSIS — Z91.89 AT HIGH RISK FOR INADEQUATE NUTRITIONAL INTAKE: ICD-10-CM

## 2023-01-24 DIAGNOSIS — E11.65 TYPE 2 DIABETES MELLITUS WITH HYPERGLYCEMIA, UNSPECIFIED WHETHER LONG TERM INSULIN USE: ICD-10-CM

## 2023-01-24 DIAGNOSIS — R60.0 BILATERAL LOWER EXTREMITY EDEMA: ICD-10-CM

## 2023-01-24 DIAGNOSIS — I83.018 VENOUS STASIS ULCER OF OTHER PART OF RIGHT LOWER LEG WITH NECROSIS OF MUSCLE, UNSPECIFIED WHETHER VARICOSE VEINS PRESENT: ICD-10-CM

## 2023-01-24 DIAGNOSIS — L97.813 VENOUS STASIS ULCER OF OTHER PART OF RIGHT LOWER LEG WITH NECROSIS OF MUSCLE, UNSPECIFIED WHETHER VARICOSE VEINS PRESENT: ICD-10-CM

## 2023-01-24 DIAGNOSIS — Z79.4 TYPE 2 DIABETES MELLITUS WITH DIABETIC NEPHROPATHY, WITH LONG-TERM CURRENT USE OF INSULIN: ICD-10-CM

## 2023-01-24 DIAGNOSIS — E11.21 TYPE 2 DIABETES MELLITUS WITH DIABETIC NEPHROPATHY, WITH LONG-TERM CURRENT USE OF INSULIN: ICD-10-CM

## 2023-01-24 DIAGNOSIS — I83.018 VENOUS STASIS ULCER OF OTHER PART OF RIGHT LOWER LEG WITH FAT LAYER EXPOSED, UNSPECIFIED WHETHER VARICOSE VEINS PRESENT: ICD-10-CM

## 2023-01-24 DIAGNOSIS — E11.3313 TYPE 2 DIABETES MELLITUS WITH BOTH EYES AFFECTED BY MODERATE NONPROLIFERATIVE RETINOPATHY AND MACULAR EDEMA, WITH LONG-TERM CURRENT USE OF INSULIN: ICD-10-CM

## 2023-01-24 DIAGNOSIS — L97.912 NON-PRESSURE CHRONIC ULCER OF RIGHT LOWER LEG WITH FAT LAYER EXPOSED: Primary | ICD-10-CM

## 2023-01-24 DIAGNOSIS — L97.812 VENOUS STASIS ULCER OF OTHER PART OF RIGHT LOWER LEG WITH FAT LAYER EXPOSED, UNSPECIFIED WHETHER VARICOSE VEINS PRESENT: ICD-10-CM

## 2023-01-24 DIAGNOSIS — L97.913 NON-PRESSURE CHRONIC ULCER OF RIGHT LOWER LEG WITH NECROSIS OF MUSCLE: ICD-10-CM

## 2023-01-24 PROCEDURE — 99214 PR OFFICE/OUTPT VISIT, EST, LEVL IV, 30-39 MIN: ICD-10-PCS | Mod: HCNC,,, | Performed by: PODIATRIST

## 2023-01-24 PROCEDURE — 3075F PR MOST RECENT SYSTOLIC BLOOD PRESS GE 130-139MM HG: ICD-10-PCS | Mod: HCNC,CPTII,, | Performed by: PODIATRIST

## 2023-01-24 PROCEDURE — 1159F PR MEDICATION LIST DOCUMENTED IN MEDICAL RECORD: ICD-10-PCS | Mod: HCNC,CPTII,, | Performed by: PODIATRIST

## 2023-01-24 PROCEDURE — 1160F RVW MEDS BY RX/DR IN RCRD: CPT | Mod: HCNC,CPTII,, | Performed by: PODIATRIST

## 2023-01-24 PROCEDURE — 3075F SYST BP GE 130 - 139MM HG: CPT | Mod: HCNC,CPTII,, | Performed by: PODIATRIST

## 2023-01-24 PROCEDURE — 3078F PR MOST RECENT DIASTOLIC BLOOD PRESSURE < 80 MM HG: ICD-10-PCS | Mod: HCNC,CPTII,, | Performed by: PODIATRIST

## 2023-01-24 PROCEDURE — 99213 OFFICE O/P EST LOW 20 MIN: CPT | Mod: HCNC | Performed by: PODIATRIST

## 2023-01-24 PROCEDURE — 4010F ACE/ARB THERAPY RXD/TAKEN: CPT | Mod: HCNC,CPTII,, | Performed by: PODIATRIST

## 2023-01-24 PROCEDURE — 3078F DIAST BP <80 MM HG: CPT | Mod: HCNC,CPTII,, | Performed by: PODIATRIST

## 2023-01-24 PROCEDURE — 1159F MED LIST DOCD IN RCRD: CPT | Mod: HCNC,CPTII,, | Performed by: PODIATRIST

## 2023-01-24 PROCEDURE — 99214 OFFICE O/P EST MOD 30 MIN: CPT | Mod: HCNC,,, | Performed by: PODIATRIST

## 2023-01-24 PROCEDURE — 99499 RISK ADDL DX/OHS AUDIT: ICD-10-PCS | Mod: ,,, | Performed by: PODIATRIST

## 2023-01-24 PROCEDURE — 1160F PR REVIEW ALL MEDS BY PRESCRIBER/CLIN PHARMACIST DOCUMENTED: ICD-10-PCS | Mod: HCNC,CPTII,, | Performed by: PODIATRIST

## 2023-01-24 PROCEDURE — 4010F PR ACE/ARB THEARPY RXD/TAKEN: ICD-10-PCS | Mod: HCNC,CPTII,, | Performed by: PODIATRIST

## 2023-01-24 PROCEDURE — 99499 UNLISTED E&M SERVICE: CPT | Mod: ,,, | Performed by: PODIATRIST

## 2023-01-26 ENCOUNTER — HOSPITAL ENCOUNTER (OUTPATIENT)
Dept: RADIOLOGY | Facility: CLINIC | Age: 63
Discharge: HOME | End: 2023-01-26
Attending: NURSE PRACTITIONER
Payer: MEDICARE

## 2023-01-26 DIAGNOSIS — Z78.0 ASYMPTOMATIC POSTMENOPAUSAL STATE: ICD-10-CM

## 2023-01-26 PROCEDURE — 77080 DXA BONE DENSITY AXIAL: CPT | Mod: 26,HCNC,, | Performed by: RADIOLOGY

## 2023-01-26 PROCEDURE — 77080 DXA BONE DENSITY AXIAL: CPT | Mod: TC,HCNC,PO

## 2023-01-26 PROCEDURE — 77080 DEXA BONE DENSITY SPINE HIP: ICD-10-PCS | Mod: 26,HCNC,, | Performed by: RADIOLOGY

## 2023-01-26 NOTE — PROGRESS NOTES
[address] [facility] [address]  [place] [address]  Phone: [phone]   [phone]    Patient's PCP:Cristina Ren MD  Referring Provider: Aaareferral Self    Subjective:      Chief Complaint:: Wound Care, Diabetes, Non-healing Wound, Wound Check, Venous Stasis, Venous Ulcer, Diabetic Foot Exam, and Leg Swelling    HPI      Patient presents for follow-up of right anterior superior lower leg wound     Steff Johnson is a 62 y.o. female who presents today with a complaint of right lower leg venous ulcers lasting for approximately 1 week.  See Wound docs for full assessment and evaluation all right lower leg venous ulcers present.     1. See Wound Docs for assessment of wounds and procedure notes  2. Continue taking all medications as prescribed  3. Continue home health dressing changes  4. RTC one week   5. Counseled patient on increasing protein intake, not getting wound wet, keeping dressing clean dry and intact, following a healthy diet, elevating legs when able, removing pressure from wound     Total time spent for E&M 35  Total time for debridement 35 minutes            Counseling/Education:  I provided patient education verbally regarding:   The aspects of diabetes and how it pertains to the feet. I explained the importance of proper diabetic foot care and how it is essential for the health of their feet.     I discussed the importance of knowing their Hemoglobin A1c and that the level needs to be as close to 6 as possible. I discussed the increase complications of high blood sugar including stroke, blindness, heart attack, kidney failure and loss of limb secondary to neuropathy and PVD.      With neuropathy, beware of any breaks in the skin or redness. These areas are not recognized early due to the numbness.     I discussed Diabetes, lower back issues, metabolic disorders, systemic causes, chemotherapy, vitamin deficiency, heavy metal exposure, as some of the causes. I also explained that as much as  40% of the time we can not find a cause. I discussed different treatments available to control the symptoms but which may not cure the problem.         Counseled patient on the aspects of diabetes and how it pertains to the feet.  I explained the importance of proper diabetic foot care and how it is essential for the health of their feet.        Shoe inspection. Patient instructed on proper foot hygeine. We discussed wearing proper shoe gear, daily foot inspections, never walking without protective shoe gear, never putting sharp instruments to feet, routine podiatric nail visits every 2-3 months.          I counseled the patient on their conditions, their implications and medical management.     >50% of this > 60 minute visit was spent face to face educating/counseling the patient     I spent a total of 60 minutes on the day of the visit.This includes face to face time and non-face to face time preparing to see the patient (eg, review of tests), obtaining and/or reviewing separately obtained history, documenting clinical information in the electronic or other health record, independently interpreting results and communicating results to the patient/family/caregiver, or care coordinator.        Patient should call the office immediately if any signs of infection, such as fever, chills, sweats, increased redness or pain.     Patient was instructed to call the clinic or go to the emergency department if their symptoms do not improve, worsens, or if new symptoms develop.  Patient was advised that if any increased swelling, pain, or numbness arise to go immediately to the ED. Patient knows to call any time if an emergency arises. Shared decision making occurred and patient verbalized understanding in agreement with this plan.      Much of the documentation for this visit was completed in the Wound Docs system.  Please see the attached documentation for further details about the patient's care. Scanned under the Media  km.         Dorys Crespo DPM     Vitals:    01/24/23 1336   BP: 132/78   Pulse: 84   Resp: 20   Temp: 98.1 °F (36.7 °C)   PainSc: 0-No pain      Shoe Size:     Past Surgical History:   Procedure Laterality Date    COLONOSCOPY N/A 4/11/2017    Procedure: COLONOSCOPY;  Surgeon: Jared Antonio MD;  Location: [facility];  Service: Endoscopy;  Laterality: N/A;    HYSTERECTOMY      OOPHORECTOMY      SHOULDER SURGERY      R     Past Medical History:   Diagnosis Date    Arthritis     Complete tear of left rotator cuff 11/09/2017    COPD (chronic obstructive pulmonary disease)     COVID-19 infection 07/02/2021    Diabetes mellitus     H/o Cerebrovascular accident (CVA) of left pontine structure 12/12/2019    Hypertension     Personal history of colonic polyps     Stroke     Wears glasses      Family History   Problem Relation Age of Onset    Diabetes Mother     Asthma Mother     Hypertension Mother     Diabetes Father     Diabetes Brother     Hypertension Brother     Anemia Daughter     Glaucoma Neg Hx     Macular degeneration Neg Hx     Retinal detachment Neg Hx         Social History:   Marital Status: Single  Alcohol History:  reports no history of alcohol use.  Tobacco History:  reports that she has never smoked. She has never used smokeless tobacco.  Drug History:  reports no history of drug use.    Review of patient's allergies indicates:  No Known Allergies    Current Outpatient Medications   Medication Sig Dispense Refill    atorvastatin (LIPITOR) 40 MG tablet Take 1 tablet (40 mg total) by mouth once daily. 90 tablet 1    blood glucose control, low Soln Use as directed (Patient taking differently: 1 each by Misc.(Non-Drug; Combo Route) route. Use as directed) 1 each 1    blood sugar diagnostic (TRUE METRIX GLUCOSE TEST STRIP) Strp Test blood sugar twice daily 200 strip 3    brimonidine 0.2% (ALPHAGAN) 0.2 % Drop Place 1 drop into both eyes 3 (three) times daily. 15 mL 3    cloNIDine 0.1 mg/24 hr td ptwk  "(CATAPRES) 0.1 mg/24 hr Place 1 patch onto the skin every 7 days. 12 patch 1    clopidogreL (PLAVIX) 75 mg tablet Take 1 tablet (75 mg total) by mouth once daily. 90 tablet 1    ergocalciferol (ERGOCALCIFEROL) 50,000 unit Cap Take 1 capsule (50,000 Units total) by mouth every 7 days. 12 capsule 1    hydrALAZINE (APRESOLINE) 100 MG tablet Take 1 tablet (100 mg total) by mouth 3 (three) times daily. 270 tablet 1    HYDROcodone-acetaminophen (NORCO)  mg per tablet       insulin aspart U-100 (NOVOLOG FLEXPEN U-100 INSULIN) 100 unit/mL (3 mL) InPn pen Inject 14 Units subcutaneously into the skin 3 (three) times daily before meals. 45 mL 3    insulin degludec (TRESIBA FLEXTOUCH U-100) 100 unit/mL (3 mL) insulin pen ADMINISTER 40 UNITS UNDER THE SKIN EVERY EVENING 12 pen 1    lancets Misc 1 lancet by Misc.(Non-Drug; Combo Route) route 2 (two) times a day. To check BG 2 times daily, to use with insurance preferred meter 200 each 3    lisinopriL 10 MG tablet 1 tablet once daily.      losartan (COZAAR) 50 MG tablet Take 50 mg by mouth.      metoprolol succinate (TOPROL-XL) 100 MG 24 hr tablet Take 1 tablet (100 mg total) by mouth once daily. 90 tablet 1    pen needle, diabetic (BD ULTRA-FINE SHORT PEN NEEDLE) 31 gauge x 5/16" Ndle 1 pen by Misc.(Non-Drug; Combo Route) route 4 (four) times daily. 300 each 5    tiotropium (SPIRIVA) 18 mcg inhalation capsule Inhale 1 capsule (18 mcg total) into the lungs once daily. Controller 90 capsule 3    valsartan-hydrochlorothiazide (DIOVAN-HCT) 160-25 mg per tablet Take 1 tablet by mouth once daily. 90 tablet 1     No current facility-administered medications for this visit.       Review of Systems   Constitutional:  Negative for chills, fatigue, fever and unexpected weight change.   HENT:  Negative for hearing loss and trouble swallowing.    Eyes:  Negative for photophobia and visual disturbance.   Respiratory:  Negative for cough, shortness of breath and wheezing.  "   Cardiovascular:  Positive for leg swelling. Negative for chest pain and palpitations.   Gastrointestinal:  Negative for abdominal pain and nausea.   Genitourinary:  Negative for dysuria and frequency.   Musculoskeletal:  Negative for arthralgias, back pain, gait problem, joint swelling and myalgias.   Skin:  Positive for wound. Negative for rash.   Neurological:  Negative for tremors, seizures, weakness, numbness and headaches.   Hematological:  Does not bruise/bleed easily.       Objective:        Physical Exam:   Foot Exam  Physical Exam  Ortho/SPM Exam     Imaging:            Assessment:       1. Non-pressure chronic ulcer of right lower leg with fat layer exposed    2. 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 At high risk for inadequate nutritional intake    4. Bilateral lower extremity edema    5. Type 2 diabetes mellitus with both eyes affected by moderate nonproliferative retinopathy and macular edema, with long-term current use of insulin    6. Type 2 diabetes mellitus with diabetic nephropathy, with long-term current use of insulin    7. Type 2 diabetes mellitus with hyperglycemia, unspecified whether long term insulin use    8. Venous stasis ulcer of other part of right lower leg with necrosis of muscle, unspecified whether varicose veins present    9. Venous stasis ulcer of other part of right lower leg with fat layer exposed, unspecified whether varicose veins present      Plan:   Non-pressure chronic ulcer of right lower leg with fat layer exposed    Non-pressure chronic ulcer of right lower leg with necrosis of muscle    At high risk for inadequate nutritional intake    Bilateral lower extremity edema    Type 2 diabetes mellitus with both eyes affected by moderate nonproliferative retinopathy and macular edema, with long-term current use of insulin    Type 2 diabetes mellitus with diabetic nephropathy, with long-term current use of insulin    Type 2 diabetes  mellitus with hyperglycemia, unspecified whether long term insulin use    Venous stasis ulcer of other part of right lower leg with necrosis of muscle, unspecified whether varicose veins present    Venous stasis ulcer of other part of right lower leg with fat layer exposed, unspecified whether varicose veins present      Follow up in about 1 week (around 1/31/2023).    Procedures          Counseling:     I provided patient education verbally regarding:   Patient diagnosis, treatment options, as well as alternatives, risks, and benefits.     This note was created using Dragon voice recognition software that occasionally misinterpreted phrases or words.

## 2023-01-31 ENCOUNTER — OFFICE VISIT (OUTPATIENT)
Dept: WOUND CARE | Facility: HOSPITAL | Age: 63
End: 2023-01-31
Attending: PODIATRIST
Payer: MEDICARE

## 2023-01-31 VITALS
DIASTOLIC BLOOD PRESSURE: 77 MMHG | TEMPERATURE: 98 F | HEART RATE: 65 BPM | RESPIRATION RATE: 17 BRPM | SYSTOLIC BLOOD PRESSURE: 135 MMHG

## 2023-01-31 DIAGNOSIS — Z87.2 HISTORY OF ULCER OF LOWER EXTREMITY: ICD-10-CM

## 2023-01-31 DIAGNOSIS — L97.913 NON-PRESSURE CHRONIC ULCER OF RIGHT LOWER LEG WITH NECROSIS OF MUSCLE: ICD-10-CM

## 2023-01-31 DIAGNOSIS — Z79.4 TYPE 2 DIABETES MELLITUS WITH BOTH EYES AFFECTED BY MODERATE NONPROLIFERATIVE RETINOPATHY AND MACULAR EDEMA, WITH LONG-TERM CURRENT USE OF INSULIN: ICD-10-CM

## 2023-01-31 DIAGNOSIS — I83.018 VENOUS STASIS ULCER OF OTHER PART OF RIGHT LOWER LEG WITH NECROSIS OF MUSCLE, UNSPECIFIED WHETHER VARICOSE VEINS PRESENT: ICD-10-CM

## 2023-01-31 DIAGNOSIS — B35.3 TINEA PEDIS OF BOTH FEET: ICD-10-CM

## 2023-01-31 DIAGNOSIS — R60.0 BILATERAL LOWER EXTREMITY EDEMA: ICD-10-CM

## 2023-01-31 DIAGNOSIS — E11.3313 TYPE 2 DIABETES MELLITUS WITH BOTH EYES AFFECTED BY MODERATE NONPROLIFERATIVE RETINOPATHY AND MACULAR EDEMA, WITH LONG-TERM CURRENT USE OF INSULIN: ICD-10-CM

## 2023-01-31 DIAGNOSIS — L97.813 VENOUS STASIS ULCER OF OTHER PART OF RIGHT LOWER LEG WITH NECROSIS OF MUSCLE, UNSPECIFIED WHETHER VARICOSE VEINS PRESENT: ICD-10-CM

## 2023-01-31 DIAGNOSIS — E11.65 TYPE 2 DIABETES MELLITUS WITH HYPERGLYCEMIA, UNSPECIFIED WHETHER LONG TERM INSULIN USE: ICD-10-CM

## 2023-01-31 DIAGNOSIS — L97.812 VENOUS STASIS ULCER OF OTHER PART OF RIGHT LOWER LEG WITH FAT LAYER EXPOSED, UNSPECIFIED WHETHER VARICOSE VEINS PRESENT: ICD-10-CM

## 2023-01-31 DIAGNOSIS — Z79.4 TYPE 2 DIABETES MELLITUS WITH DIABETIC NEPHROPATHY, WITH LONG-TERM CURRENT USE OF INSULIN: ICD-10-CM

## 2023-01-31 DIAGNOSIS — I83.018 VENOUS STASIS ULCER OF OTHER PART OF RIGHT LOWER LEG WITH FAT LAYER EXPOSED, UNSPECIFIED WHETHER VARICOSE VEINS PRESENT: ICD-10-CM

## 2023-01-31 DIAGNOSIS — Z91.89 AT HIGH RISK FOR INADEQUATE NUTRITIONAL INTAKE: ICD-10-CM

## 2023-01-31 DIAGNOSIS — L97.912 NON-PRESSURE CHRONIC ULCER OF RIGHT LOWER LEG WITH FAT LAYER EXPOSED: Primary | ICD-10-CM

## 2023-01-31 DIAGNOSIS — R23.4 FISSURE IN SKIN OF BOTH FEET: ICD-10-CM

## 2023-01-31 DIAGNOSIS — E11.21 TYPE 2 DIABETES MELLITUS WITH DIABETIC NEPHROPATHY, WITH LONG-TERM CURRENT USE OF INSULIN: ICD-10-CM

## 2023-01-31 PROCEDURE — 3075F SYST BP GE 130 - 139MM HG: CPT | Mod: HCNC,CPTII,, | Performed by: PODIATRIST

## 2023-01-31 PROCEDURE — 1159F PR MEDICATION LIST DOCUMENTED IN MEDICAL RECORD: ICD-10-PCS | Mod: HCNC,CPTII,, | Performed by: PODIATRIST

## 2023-01-31 PROCEDURE — 4010F ACE/ARB THERAPY RXD/TAKEN: CPT | Mod: HCNC,CPTII,, | Performed by: PODIATRIST

## 2023-01-31 PROCEDURE — 1159F MED LIST DOCD IN RCRD: CPT | Mod: HCNC,CPTII,, | Performed by: PODIATRIST

## 2023-01-31 PROCEDURE — 3078F DIAST BP <80 MM HG: CPT | Mod: HCNC,CPTII,, | Performed by: PODIATRIST

## 2023-01-31 PROCEDURE — 11042 PR DEBRIDEMENT, SKIN, SUB-Q TISSUE,=<20 SQ CM: ICD-10-PCS | Mod: HCNC,,, | Performed by: PODIATRIST

## 2023-01-31 PROCEDURE — 1160F RVW MEDS BY RX/DR IN RCRD: CPT | Mod: HCNC,CPTII,, | Performed by: PODIATRIST

## 2023-01-31 PROCEDURE — 99499 UNLISTED E&M SERVICE: CPT | Mod: ,,, | Performed by: PODIATRIST

## 2023-01-31 PROCEDURE — 99499 RISK ADDL DX/OHS AUDIT: ICD-10-PCS | Mod: ,,, | Performed by: PODIATRIST

## 2023-01-31 PROCEDURE — 3078F PR MOST RECENT DIASTOLIC BLOOD PRESSURE < 80 MM HG: ICD-10-PCS | Mod: HCNC,CPTII,, | Performed by: PODIATRIST

## 2023-01-31 PROCEDURE — 11042 DBRDMT SUBQ TIS 1ST 20SQCM/<: CPT | Mod: HCNC,,, | Performed by: PODIATRIST

## 2023-01-31 PROCEDURE — 99214 PR OFFICE/OUTPT VISIT, EST, LEVL IV, 30-39 MIN: ICD-10-PCS | Mod: 25,HCNC,, | Performed by: PODIATRIST

## 2023-01-31 PROCEDURE — 99214 OFFICE O/P EST MOD 30 MIN: CPT | Mod: 25,HCNC,, | Performed by: PODIATRIST

## 2023-01-31 PROCEDURE — 11042 DBRDMT SUBQ TIS 1ST 20SQCM/<: CPT | Mod: HCNC | Performed by: PODIATRIST

## 2023-01-31 PROCEDURE — 4010F PR ACE/ARB THEARPY RXD/TAKEN: ICD-10-PCS | Mod: HCNC,CPTII,, | Performed by: PODIATRIST

## 2023-01-31 PROCEDURE — 3075F PR MOST RECENT SYSTOLIC BLOOD PRESS GE 130-139MM HG: ICD-10-PCS | Mod: HCNC,CPTII,, | Performed by: PODIATRIST

## 2023-01-31 PROCEDURE — 1160F PR REVIEW ALL MEDS BY PRESCRIBER/CLIN PHARMACIST DOCUMENTED: ICD-10-PCS | Mod: HCNC,CPTII,, | Performed by: PODIATRIST

## 2023-01-31 NOTE — PROGRESS NOTES
[address] [facility] [address]  [place] [address]  Phone: [phone]   [phone]    Patient's PCP:Cristina Ren MD  Referring Provider: No ref. provider found    Subjective:      Chief Complaint:: Wound Care, Diabetes, Wound Check, Leg Swelling, Tinea Pedis, Non-healing Wound, Venous Stasis, and Venous Ulcer    HPI    Patient presents for follow-up of right anterior superior lower leg wound.    Additionally, patient presents with dry skin and heel fissuring bilateral feet.     Steff Johnson is a 62 y.o. female who presents today with a complaint of right lower leg venous ulcers lasting for approximately 1 week.  See Wound docs for full assessment and evaluation all right lower leg venous ulcers present.     1. Debridement See Wound Docs for assessment of wounds and procedure notes  2. Continue taking all medications as prescribed  3. Continue home health dressing changes  4. RTC one week   5. Counseled patient on increasing protein intake, not getting wound wet, keeping dressing clean dry and intact, following a healthy diet, elevating legs when able, removing pressure from wound     Total time spent for E&M 35  Total time for debridement 35 minutes       Athlete's foot counseling: Counseled patient that it is important to keep the feet dry. Use absorbent cotton socks and change them if they become sweaty. Or wear an open-toe shoe or sandal. Wash the feet at least once a day with soap and water. Apply the antifungal cream as prescribed. Recommend spray antiperspirant to the feet. Some antifungal creams are available without a prescription. It may take a week before the rash starts to improve. It can take about 3 to 4 weeks to completely clear. Continue the medicine until the rash is all gone. Use over-the-counter antifungal powders or sprays on your feet after exposure to high-risk environments, such as public showers, gyms, and locker rooms. This can help prevent future infections. Wearing appropriate  shoes in these situations can help.      Fungal infection of skin explained. Treatment options including no treatment, topical medications, oral medications were discussed, as well as success rates and risks of recurrence. We agreed on topical medication   Lotrisone prescribed     Counseling/Education:  I provided patient education verbally regarding:   The aspects of diabetes and how it pertains to the feet. I explained the importance of proper diabetic foot care and how it is essential for the health of their feet.     I discussed the importance of knowing their Hemoglobin A1c and that the level needs to be as close to 6 as possible. I discussed the increase complications of high blood sugar including stroke, blindness, heart attack, kidney failure and loss of limb secondary to neuropathy and PVD.      With neuropathy, beware of any breaks in the skin or redness. These areas are not recognized early due to the numbness.     I discussed Diabetes, lower back issues, metabolic disorders, systemic causes, chemotherapy, vitamin deficiency, heavy metal exposure, as some of the causes. I also explained that as much as 40% of the time we can not find a cause. I discussed different treatments available to control the symptoms but which may not cure the problem.         Counseled patient on the aspects of diabetes and how it pertains to the feet.  I explained the importance of proper diabetic foot care and how it is essential for the health of their feet.        Shoe inspection. Patient instructed on proper foot hygeine. We discussed wearing proper shoe gear, daily foot inspections, never walking without protective shoe gear, never putting sharp instruments to feet, routine podiatric nail visits every 2-3 months.          I counseled the patient on their conditions, their implications and medical management.     >50% of this > 60 minute visit was spent face to face educating/counseling the patient     I spent a total of 60  minutes on the day of the visit.This includes face to face time and non-face to face time preparing to see the patient (eg, review of tests), obtaining and/or reviewing separately obtained history, documenting clinical information in the electronic or other health record, independently interpreting results and communicating results to the patient/family/caregiver, or care coordinator.        Patient should call the office immediately if any signs of infection, such as fever, chills, sweats, increased redness or pain.     Patient was instructed to call the clinic or go to the emergency department if their symptoms do not improve, worsens, or if new symptoms develop.  Patient was advised that if any increased swelling, pain, or numbness arise to go immediately to the ED. Patient knows to call any time if an emergency arises. Shared decision making occurred and patient verbalized understanding in agreement with this plan.      Much of the documentation for this visit was completed in the Wound Docs system.  Please see the attached documentation for further details about the patient's care. Scanned under the Media tab.         Dorys Manchris, DPNIALL     Vitals:    01/31/23 1452   BP: 135/77   Pulse: 65   Resp: 17   Temp: 98.2 °F (36.8 °C)   PainSc: 0-No pain      Shoe Size:     Past Surgical History:   Procedure Laterality Date    COLONOSCOPY N/A 4/11/2017    Procedure: COLONOSCOPY;  Surgeon: Jared Antonio MD;  Location: [facility];  Service: Endoscopy;  Laterality: N/A;    HYSTERECTOMY      OOPHORECTOMY      SHOULDER SURGERY      R     Past Medical History:   Diagnosis Date    Arthritis     Complete tear of left rotator cuff 11/09/2017    COPD (chronic obstructive pulmonary disease)     COVID-19 infection 07/02/2021    Diabetes mellitus     H/o Cerebrovascular accident (CVA) of left pontine structure 12/12/2019    Hypertension     Personal history of colonic polyps     Stroke     Wears glasses      Family History    Problem Relation Age of Onset    Diabetes Mother     Asthma Mother     Hypertension Mother     Diabetes Father     Diabetes Brother     Hypertension Brother     Anemia Daughter     Glaucoma Neg Hx     Macular degeneration Neg Hx     Retinal detachment Neg Hx         Social History:   Marital Status: Single  Alcohol History:  reports no history of alcohol use.  Tobacco History:  reports that she has never smoked. She has never used smokeless tobacco.  Drug History:  reports no history of drug use.    Review of patient's allergies indicates:  No Known Allergies    Current Outpatient Medications   Medication Sig Dispense Refill    atorvastatin (LIPITOR) 40 MG tablet Take 1 tablet (40 mg total) by mouth once daily. 90 tablet 1    blood glucose control, low Soln Use as directed (Patient taking differently: 1 each by Misc.(Non-Drug; Combo Route) route. Use as directed) 1 each 1    blood sugar diagnostic (TRUE METRIX GLUCOSE TEST STRIP) Strp Test blood sugar twice daily 200 strip 3    brimonidine 0.2% (ALPHAGAN) 0.2 % Drop Place 1 drop into both eyes 3 (three) times daily. 15 mL 3    cloNIDine 0.1 mg/24 hr td ptwk (CATAPRES) 0.1 mg/24 hr Place 1 patch onto the skin every 7 days. 12 patch 1    clopidogreL (PLAVIX) 75 mg tablet Take 1 tablet (75 mg total) by mouth once daily. 90 tablet 1    ergocalciferol (ERGOCALCIFEROL) 50,000 unit Cap Take 1 capsule (50,000 Units total) by mouth every 7 days. 12 capsule 1    hydrALAZINE (APRESOLINE) 100 MG tablet Take 1 tablet (100 mg total) by mouth 3 (three) times daily. 270 tablet 1    HYDROcodone-acetaminophen (NORCO)  mg per tablet       insulin aspart U-100 (NOVOLOG FLEXPEN U-100 INSULIN) 100 unit/mL (3 mL) InPn pen Inject 14 Units subcutaneously into the skin 3 (three) times daily before meals. 45 mL 3    insulin degludec (TRESIBA FLEXTOUCH U-100) 100 unit/mL (3 mL) insulin pen ADMINISTER 40 UNITS UNDER THE SKIN EVERY EVENING 12 pen 1    lancets Misc 1 lancet by  "Misc.(Non-Drug; Combo Route) route 2 (two) times a day. To check BG 2 times daily, to use with insurance preferred meter 200 each 3    lisinopriL 10 MG tablet 1 tablet once daily.      losartan (COZAAR) 50 MG tablet Take 50 mg by mouth.      metoprolol succinate (TOPROL-XL) 100 MG 24 hr tablet Take 1 tablet (100 mg total) by mouth once daily. 90 tablet 1    pen needle, diabetic (BD ULTRA-FINE SHORT PEN NEEDLE) 31 gauge x 5/16" Ndle 1 pen by Misc.(Non-Drug; Combo Route) route 4 (four) times daily. 300 each 5    tiotropium (SPIRIVA) 18 mcg inhalation capsule Inhale 1 capsule (18 mcg total) into the lungs once daily. Controller 90 capsule 3    valsartan-hydrochlorothiazide (DIOVAN-HCT) 160-25 mg per tablet Take 1 tablet by mouth once daily. 90 tablet 1     No current facility-administered medications for this visit.       Review of Systems   Constitutional:  Negative for chills, fatigue, fever and unexpected weight change.   HENT:  Negative for hearing loss and trouble swallowing.    Eyes:  Negative for photophobia and visual disturbance.   Respiratory:  Negative for cough, shortness of breath and wheezing.    Cardiovascular:  Positive for leg swelling. Negative for chest pain and palpitations.   Gastrointestinal:  Negative for abdominal pain and nausea.   Genitourinary:  Negative for dysuria and frequency.   Musculoskeletal:  Negative for arthralgias, back pain, gait problem, joint swelling and myalgias.   Skin:  Positive for wound. Negative for rash.   Neurological:  Negative for tremors, seizures, weakness, numbness and headaches.   Hematological:  Does not bruise/bleed easily.       Objective:        Physical Exam:   Foot Exam  Physical Exam  Ortho/SPM Exam     Dry scale with superficial flakes over an erythematous base bilateral feet in moccasin distribution without ulceration, drainage, pus, tracking, fluctuance, malodor, or cardinal signs infection.    Toenails 1st, 2nd, 3rd, 4th, 5th  bilateral are " hypertrophic, dystrophic, discolored tanish brown with tan, gray crumbly subungual debris.  Tender to distal nail plate pressure, without periungual skin abnormality of each.    Imaging:            Assessment:       1. Non-pressure chronic ulcer of right lower leg with fat layer exposed    2. Type 2 diabetes mellitus with diabetic nephropathy, with long-term current use of insulin    3. Bilateral lower extremity edema    4. Type 2 diabetes mellitus with both eyes affected by moderate nonproliferative retinopathy and macular edema, with long-term current use of insulin    5. At high risk for inadequate nutritional intake    6. Venous stasis ulcer of other part of right lower leg with necrosis of muscle, unspecified whether varicose veins present    7. 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 Type 2 diabetes mellitus with hyperglycemia, unspecified whether long term insulin use    9. Venous stasis ulcer of other part of right lower leg with fat layer exposed, unspecified whether varicose veins present    10. Tinea pedis of both feet    11. History of ulcer of lower extremity    12. Fissure in skin of both feet      Plan:   Non-pressure chronic ulcer of right lower leg with fat layer exposed    Type 2 diabetes mellitus with diabetic nephropathy, with long-term current use of insulin    Bilateral lower extremity edema    Type 2 diabetes mellitus with both eyes affected by moderate nonproliferative retinopathy and macular edema, with long-term current use of insulin    At high risk for inadequate nutritional intake    Venous stasis ulcer of other part of right lower leg with necrosis of muscle, unspecified whether varicose veins present    Non-pressure chronic ulcer of right lower leg with necrosis of muscle    Type 2 diabetes mellitus with hyperglycemia, unspecified whether long term insulin use    Venous stasis ulcer of other part of right lower leg with fat layer exposed, unspecified  whether varicose veins present    Tinea pedis of both feet    History of ulcer of lower extremity    Fissure in skin of both feet      Follow up in about 1 week (around 2/7/2023).    Procedures          Counseling:     I provided patient education verbally regarding:   Patient diagnosis, treatment options, as well as alternatives, risks, and benefits.     This note was created using Dragon voice recognition software that occasionally misinterpreted phrases or words.

## 2023-02-01 ENCOUNTER — TELEPHONE (OUTPATIENT)
Dept: NEUROLOGY | Facility: CLINIC | Age: 63
End: 2023-02-01
Payer: MEDICARE

## 2023-02-01 ENCOUNTER — TELEPHONE (OUTPATIENT)
Dept: OPHTHALMOLOGY | Facility: CLINIC | Age: 63
End: 2023-02-01
Payer: MEDICARE

## 2023-02-01 NOTE — TELEPHONE ENCOUNTER
Spoke with the pt's friend, isabella scheduled with Elvira Solares on 2/13/23 at [id number] for memory issues.  Date, time and location discussed.

## 2023-02-07 ENCOUNTER — OFFICE VISIT (OUTPATIENT)
Dept: WOUND CARE | Facility: HOSPITAL | Age: 63
End: 2023-02-07
Attending: PODIATRIST
Payer: MEDICARE

## 2023-02-07 VITALS
RESPIRATION RATE: 17 BRPM | TEMPERATURE: 98 F | DIASTOLIC BLOOD PRESSURE: 75 MMHG | SYSTOLIC BLOOD PRESSURE: 130 MMHG | HEART RATE: 67 BPM

## 2023-02-07 DIAGNOSIS — Z79.4 TYPE 2 DIABETES MELLITUS WITH DIABETIC NEPHROPATHY, WITH LONG-TERM CURRENT USE OF INSULIN: ICD-10-CM

## 2023-02-07 DIAGNOSIS — L97.812 VENOUS STASIS ULCER OF OTHER PART OF RIGHT LOWER LEG WITH FAT LAYER EXPOSED, UNSPECIFIED WHETHER VARICOSE VEINS PRESENT: ICD-10-CM

## 2023-02-07 DIAGNOSIS — Z87.2 HISTORY OF ULCER OF LOWER EXTREMITY: ICD-10-CM

## 2023-02-07 DIAGNOSIS — E11.3313 TYPE 2 DIABETES MELLITUS WITH BOTH EYES AFFECTED BY MODERATE NONPROLIFERATIVE RETINOPATHY AND MACULAR EDEMA, WITH LONG-TERM CURRENT USE OF INSULIN: ICD-10-CM

## 2023-02-07 DIAGNOSIS — Z79.4 TYPE 2 DIABETES MELLITUS WITH BOTH EYES AFFECTED BY MODERATE NONPROLIFERATIVE RETINOPATHY AND MACULAR EDEMA, WITH LONG-TERM CURRENT USE OF INSULIN: ICD-10-CM

## 2023-02-07 DIAGNOSIS — Z91.89 AT HIGH RISK FOR INADEQUATE NUTRITIONAL INTAKE: ICD-10-CM

## 2023-02-07 DIAGNOSIS — L97.912 NON-PRESSURE CHRONIC ULCER OF RIGHT LOWER LEG WITH FAT LAYER EXPOSED: Primary | ICD-10-CM

## 2023-02-07 DIAGNOSIS — L97.913 NON-PRESSURE CHRONIC ULCER OF RIGHT LOWER LEG WITH NECROSIS OF MUSCLE: ICD-10-CM

## 2023-02-07 DIAGNOSIS — E11.65 TYPE 2 DIABETES MELLITUS WITH HYPERGLYCEMIA, UNSPECIFIED WHETHER LONG TERM INSULIN USE: ICD-10-CM

## 2023-02-07 DIAGNOSIS — E11.21 TYPE 2 DIABETES MELLITUS WITH DIABETIC NEPHROPATHY, WITH LONG-TERM CURRENT USE OF INSULIN: ICD-10-CM

## 2023-02-07 DIAGNOSIS — I83.018 VENOUS STASIS ULCER OF OTHER PART OF RIGHT LOWER LEG WITH NECROSIS OF MUSCLE, UNSPECIFIED WHETHER VARICOSE VEINS PRESENT: ICD-10-CM

## 2023-02-07 DIAGNOSIS — R60.0 BILATERAL LOWER EXTREMITY EDEMA: ICD-10-CM

## 2023-02-07 DIAGNOSIS — I83.018 VENOUS STASIS ULCER OF OTHER PART OF RIGHT LOWER LEG WITH FAT LAYER EXPOSED, UNSPECIFIED WHETHER VARICOSE VEINS PRESENT: ICD-10-CM

## 2023-02-07 DIAGNOSIS — Z00.00 ENCOUNTER FOR MEDICARE ANNUAL WELLNESS EXAM: ICD-10-CM

## 2023-02-07 DIAGNOSIS — B35.3 TINEA PEDIS OF BOTH FEET: ICD-10-CM

## 2023-02-07 DIAGNOSIS — L97.813 VENOUS STASIS ULCER OF OTHER PART OF RIGHT LOWER LEG WITH NECROSIS OF MUSCLE, UNSPECIFIED WHETHER VARICOSE VEINS PRESENT: ICD-10-CM

## 2023-02-07 PROCEDURE — 1160F PR REVIEW ALL MEDS BY PRESCRIBER/CLIN PHARMACIST DOCUMENTED: ICD-10-PCS | Mod: HCNC,CPTII,, | Performed by: PODIATRIST

## 2023-02-07 PROCEDURE — 99499 RISK ADDL DX/OHS AUDIT: ICD-10-PCS | Mod: HCNC,,, | Performed by: PODIATRIST

## 2023-02-07 PROCEDURE — 3078F PR MOST RECENT DIASTOLIC BLOOD PRESSURE < 80 MM HG: ICD-10-PCS | Mod: HCNC,CPTII,, | Performed by: PODIATRIST

## 2023-02-07 PROCEDURE — 99499 UNLISTED E&M SERVICE: CPT | Mod: HCNC,,, | Performed by: PODIATRIST

## 2023-02-07 PROCEDURE — 99214 OFFICE O/P EST MOD 30 MIN: CPT | Mod: HCNC,,, | Performed by: PODIATRIST

## 2023-02-07 PROCEDURE — 3075F PR MOST RECENT SYSTOLIC BLOOD PRESS GE 130-139MM HG: ICD-10-PCS | Mod: HCNC,CPTII,, | Performed by: PODIATRIST

## 2023-02-07 PROCEDURE — 4010F PR ACE/ARB THEARPY RXD/TAKEN: ICD-10-PCS | Mod: HCNC,CPTII,, | Performed by: PODIATRIST

## 2023-02-07 PROCEDURE — 1159F PR MEDICATION LIST DOCUMENTED IN MEDICAL RECORD: ICD-10-PCS | Mod: HCNC,CPTII,, | Performed by: PODIATRIST

## 2023-02-07 PROCEDURE — 4010F ACE/ARB THERAPY RXD/TAKEN: CPT | Mod: HCNC,CPTII,, | Performed by: PODIATRIST

## 2023-02-07 PROCEDURE — 3078F DIAST BP <80 MM HG: CPT | Mod: HCNC,CPTII,, | Performed by: PODIATRIST

## 2023-02-07 PROCEDURE — 99214 OFFICE O/P EST MOD 30 MIN: CPT | Mod: HCNC | Performed by: PODIATRIST

## 2023-02-07 PROCEDURE — 99214 PR OFFICE/OUTPT VISIT, EST, LEVL IV, 30-39 MIN: ICD-10-PCS | Mod: HCNC,,, | Performed by: PODIATRIST

## 2023-02-07 PROCEDURE — 1160F RVW MEDS BY RX/DR IN RCRD: CPT | Mod: HCNC,CPTII,, | Performed by: PODIATRIST

## 2023-02-07 PROCEDURE — 1159F MED LIST DOCD IN RCRD: CPT | Mod: HCNC,CPTII,, | Performed by: PODIATRIST

## 2023-02-07 PROCEDURE — 3075F SYST BP GE 130 - 139MM HG: CPT | Mod: HCNC,CPTII,, | Performed by: PODIATRIST

## 2023-02-09 DIAGNOSIS — Z00.00 ENCOUNTER FOR MEDICARE ANNUAL WELLNESS EXAM: ICD-10-CM

## 2023-02-09 NOTE — PATIENT INSTRUCTIONS
Simply saline nasal spray   Change hydralazine to 50 mg two times a day. Monitor blood pressure at home. Please record your blood pressure on your log that was provided. The goal blood pressure is less than 140/90. Take all medications as prescribed.     Viral Upper Respiratory Illness (Adult)  You have a viral upper respiratory illness (URI), which is another term for the common cold. This illness is contagious during the first few days. It is spread through the air by coughing and sneezing. It may also be spread by direct contact (touching the sick person and then touching your own eyes, nose, or mouth). Frequent handwashing will decrease risk of spread. Most viral illnesses go away within 7 to 10 days with rest and simple home remedies. Sometimes the illness may last for several weeks. Antibiotics will not kill a virus, and they are generally not prescribed for this condition.    Home care  · If symptoms are severe, rest at home for the first 2 to 3 days. When you resume activity, don't let yourself get too tired.  · Avoid being exposed to cigarette smoke (yours or others).  · You may use acetaminophen or ibuprofen to control pain and fever, unless another medicine was prescribed. (Note: If you have chronic liver or kidney disease, have ever had a stomach ulcer or gastrointestinal bleeding, or are taking blood-thinning medicines, talk with your healthcare provider before using these medicines.) Aspirin should never be given to anyone under 18 years of age who is ill with a viral infection or fever. It may cause severe liver or brain damage.  · Your appetite may be poor, so a light diet is fine. Avoid dehydration by drinking 6 to 8 glasses of fluids per day (water, soft drinks, juices, tea, or soup). Extra fluids will help loosen secretions in the nose and lungs.  · Over-the-counter cold medicines will not shorten the length of time youre sick, but they may be helpful for the following symptoms: cough, sore  throat, and nasal and sinus congestion. (Note: Do not use decongestants if you have high blood pressure.)  Follow-up care  Follow up with your healthcare provider, or as advised.  When to seek medical advice  Call your healthcare provider right away if any of these occur:  · Cough with lots of colored sputum (mucus)  · Severe headache; face, neck, or ear pain  · Difficulty swallowing due to throat pain  · Fever of 100.4°F (38°C)  Call 911, or get immediate medical care  Call emergency services right away if any of these occur:  · Chest pain, shortness of breath, wheezing, or difficulty breathing  · Coughing up blood  · Inability to swallow due to throat pain  Date Last Reviewed: 9/13/2015  © 5991-7592 [employer]. [address] [place] [address], [place] [address]. All rights reserved. This information is not intended as a substitute for professional medical care. Always follow your healthcare professional's instructions.         JUAN CARLOS

## 2023-02-10 NOTE — PROGRESS NOTES
[address] [facility] [address]  [place] [address]  Phone: [phone]   [phone]    Patient's PCP:Cristina Ren MD  Referring Provider: No ref. provider found    Subjective:      Chief Complaint:: Wound Care, Diabetes, Venous Stasis, Venous Ulcer, Non-healing Wound, Leg Swelling, and Wound Check    HPI    Patient presents for follow-up of right anterior superior lower leg wound.     Additionally, patient presents with dry skin and heel fissuring bilateral feet.     Steff Johnson is a 62 y.o. female who presents today with a complaint of right lower leg venous ulcers lasting for approximately 1 week.  See Wound docs for full assessment and evaluation all right lower leg venous ulcers present.     1. See Wound Docs for assessment of wounds and procedure notes  2. Continue taking all medications as prescribed  3. Continue home health dressing changes  4. RTC one week   5. Counseled patient on increasing protein intake, not getting wound wet, keeping dressing clean dry and intact, following a healthy diet, elevating legs when able, removing pressure from wound     Total time spent for E&M 35  Total time for debridement 35 minutes       Athlete's foot counseling: Counseled patient that it is important to keep the feet dry. Use absorbent cotton socks and change them if they become sweaty. Or wear an open-toe shoe or sandal. Wash the feet at least once a day with soap and water. Apply the antifungal cream as prescribed. Recommend spray antiperspirant to the feet. Some antifungal creams are available without a prescription. It may take a week before the rash starts to improve. It can take about 3 to 4 weeks to completely clear. Continue the medicine until the rash is all gone. Use over-the-counter antifungal powders or sprays on your feet after exposure to high-risk environments, such as public showers, gyms, and locker rooms. This can help prevent future infections. Wearing appropriate shoes in these  situations can help.        Fungal infection of skin explained. Treatment options including no treatment, topical medications, oral medications were discussed, as well as success rates and risks of recurrence. We agreed on topical medication   Lotrisone prescribed     Counseling/Education:  I provided patient education verbally regarding:   The aspects of diabetes and how it pertains to the feet. I explained the importance of proper diabetic foot care and how it is essential for the health of their feet.     I discussed the importance of knowing their Hemoglobin A1c and that the level needs to be as close to 6 as possible. I discussed the increase complications of high blood sugar including stroke, blindness, heart attack, kidney failure and loss of limb secondary to neuropathy and PVD.      With neuropathy, beware of any breaks in the skin or redness. These areas are not recognized early due to the numbness.     I discussed Diabetes, lower back issues, metabolic disorders, systemic causes, chemotherapy, vitamin deficiency, heavy metal exposure, as some of the causes. I also explained that as much as 40% of the time we can not find a cause. I discussed different treatments available to control the symptoms but which may not cure the problem.         Counseled patient on the aspects of diabetes and how it pertains to the feet.  I explained the importance of proper diabetic foot care and how it is essential for the health of their feet.        Shoe inspection. Patient instructed on proper foot hygeine. We discussed wearing proper shoe gear, daily foot inspections, never walking without protective shoe gear, never putting sharp instruments to feet, routine podiatric nail visits every 2-3 months.          I counseled the patient on their conditions, their implications and medical management.     >50% of this > 60 minute visit was spent face to face educating/counseling the patient     I spent a total of 60 minutes on  the day of the visit.This includes face to face time and non-face to face time preparing to see the patient (eg, review of tests), obtaining and/or reviewing separately obtained history, documenting clinical information in the electronic or other health record, independently interpreting results and communicating results to the patient/family/caregiver, or care coordinator.        Patient should call the office immediately if any signs of infection, such as fever, chills, sweats, increased redness or pain.     Patient was instructed to call the clinic or go to the emergency department if their symptoms do not improve, worsens, or if new symptoms develop.  Patient was advised that if any increased swelling, pain, or numbness arise to go immediately to the ED. Patient knows to call any time if an emergency arises. Shared decision making occurred and patient verbalized understanding in agreement with this plan.      Much of the documentation for this visit was completed in the Wound Docs system.  Please see the attached documentation for further details about the patient's care. Scanned under the Media tab.         Dorys Crespo, DPNIALL     Vitals:    02/07/23 1435   BP: 130/75   Pulse: 67   Resp: 17   Temp: 98.3 °F (36.8 °C)   PainSc: 0-No pain      Shoe Size:     Past Surgical History:   Procedure Laterality Date    COLONOSCOPY N/A 4/11/2017    Procedure: COLONOSCOPY;  Surgeon: Jared Antonio MD;  Location: [facility];  Service: Endoscopy;  Laterality: N/A;    HYSTERECTOMY      OOPHORECTOMY      SHOULDER SURGERY      R     Past Medical History:   Diagnosis Date    Arthritis     Complete tear of left rotator cuff 11/09/2017    COPD (chronic obstructive pulmonary disease)     COVID-19 infection 07/02/2021    Diabetes mellitus     H/o Cerebrovascular accident (CVA) of left pontine structure 12/12/2019    Hypertension     Personal history of colonic polyps     Stroke     Wears glasses      Family History   Problem  Relation Age of Onset    Diabetes Mother     Asthma Mother     Hypertension Mother     Diabetes Father     Diabetes Brother     Hypertension Brother     Anemia Daughter     Glaucoma Neg Hx     Macular degeneration Neg Hx     Retinal detachment Neg Hx         Social History:   Marital Status: Single  Alcohol History:  reports no history of alcohol use.  Tobacco History:  reports that she has never smoked. She has never used smokeless tobacco.  Drug History:  reports no history of drug use.    Review of patient's allergies indicates:  No Known Allergies    Current Outpatient Medications   Medication Sig Dispense Refill    atorvastatin (LIPITOR) 40 MG tablet Take 1 tablet (40 mg total) by mouth once daily. 90 tablet 1    blood glucose control, low Soln Use as directed (Patient taking differently: 1 each by Misc.(Non-Drug; Combo Route) route. Use as directed) 1 each 1    blood sugar diagnostic (TRUE METRIX GLUCOSE TEST STRIP) Strp Test blood sugar twice daily 200 strip 3    brimonidine 0.2% (ALPHAGAN) 0.2 % Drop Place 1 drop into both eyes 3 (three) times daily. 15 mL 3    cloNIDine 0.1 mg/24 hr td ptwk (CATAPRES) 0.1 mg/24 hr Place 1 patch onto the skin every 7 days. 12 patch 1    clopidogreL (PLAVIX) 75 mg tablet Take 1 tablet (75 mg total) by mouth once daily. 90 tablet 1    ergocalciferol (ERGOCALCIFEROL) 50,000 unit Cap Take 1 capsule (50,000 Units total) by mouth every 7 days. 12 capsule 1    hydrALAZINE (APRESOLINE) 100 MG tablet Take 1 tablet (100 mg total) by mouth 3 (three) times daily. 270 tablet 1    HYDROcodone-acetaminophen (NORCO)  mg per tablet       insulin aspart U-100 (NOVOLOG FLEXPEN U-100 INSULIN) 100 unit/mL (3 mL) InPn pen Inject 14 Units subcutaneously into the skin 3 (three) times daily before meals. 45 mL 3    insulin degludec (TRESIBA FLEXTOUCH U-100) 100 unit/mL (3 mL) insulin pen ADMINISTER 40 UNITS UNDER THE SKIN EVERY EVENING 12 pen 1    lancets Misc 1 lancet by Misc.(Non-Drug; Combo  "Route) route 2 (two) times a day. To check BG 2 times daily, to use with insurance preferred meter 200 each 3    lisinopriL 10 MG tablet 1 tablet once daily.      losartan (COZAAR) 50 MG tablet Take 50 mg by mouth.      metoprolol succinate (TOPROL-XL) 100 MG 24 hr tablet Take 1 tablet (100 mg total) by mouth once daily. 90 tablet 1    pen needle, diabetic (BD ULTRA-FINE SHORT PEN NEEDLE) 31 gauge x 5/16" Ndle 1 pen by Misc.(Non-Drug; Combo Route) route 4 (four) times daily. 300 each 5    tiotropium (SPIRIVA) 18 mcg inhalation capsule Inhale 1 capsule (18 mcg total) into the lungs once daily. Controller 90 capsule 3    valsartan-hydrochlorothiazide (DIOVAN-HCT) 160-25 mg per tablet Take 1 tablet by mouth once daily. 90 tablet 1     No current facility-administered medications for this visit.       Review of Systems   Constitutional:  Negative for chills, fatigue, fever and unexpected weight change.   HENT:  Negative for hearing loss and trouble swallowing.    Eyes:  Negative for photophobia and visual disturbance.   Respiratory:  Negative for cough, shortness of breath and wheezing.    Cardiovascular:  Positive for leg swelling. Negative for chest pain and palpitations.   Gastrointestinal:  Negative for abdominal pain and nausea.   Genitourinary:  Negative for dysuria and frequency.   Musculoskeletal:  Negative for arthralgias, back pain, gait problem, joint swelling and myalgias.   Skin:  Positive for wound. Negative for rash.   Neurological:  Negative for tremors, seizures, weakness, numbness and headaches.   Hematological:  Does not bruise/bleed easily.       Objective:        Physical Exam:   Foot Exam  Physical Exam  Ortho/SPM Exam     Imaging:            Assessment:       1. Non-pressure chronic ulcer of right lower leg with fat layer exposed    2. Type 2 diabetes mellitus with diabetic nephropathy, with long-term current use of insulin    3. Bilateral lower extremity edema    4. Type 2 diabetes mellitus with " both eyes affected by moderate nonproliferative retinopathy and macular edema, with long-term current use of insulin    5. History of ulcer of lower extremity    6. 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 Type 2 diabetes mellitus with hyperglycemia, unspecified whether long term insulin use    8. Venous stasis ulcer of other part of right lower leg with necrosis of muscle, unspecified whether varicose veins present    9. Tinea pedis of both feet    10. Venous stasis ulcer of other part of right lower leg with fat layer exposed, unspecified whether varicose veins present    11. 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    Procedures          Counseling:     I provided patient education verbally regarding:   Patient diagnosis, treatment options, as well as alternatives, risks, and benefits.     This note was created using Dragon voice recognition software that occasionally misinterpreted phrases or words.

## 2023-02-13 ENCOUNTER — OFFICE VISIT (OUTPATIENT)
Dept: WOUND CARE | Facility: HOSPITAL | Age: 63
End: 2023-02-13
Attending: PODIATRIST
Payer: MEDICARE

## 2023-02-13 VITALS
DIASTOLIC BLOOD PRESSURE: 98 MMHG | RESPIRATION RATE: 18 BRPM | HEART RATE: 89 BPM | TEMPERATURE: 98 F | SYSTOLIC BLOOD PRESSURE: 140 MMHG

## 2023-02-13 DIAGNOSIS — B35.3 TINEA PEDIS OF BOTH FEET: ICD-10-CM

## 2023-02-13 DIAGNOSIS — E11.9 COMPREHENSIVE DIABETIC FOOT EXAMINATION, TYPE 2 DM, ENCOUNTER FOR: ICD-10-CM

## 2023-02-13 DIAGNOSIS — Z91.89 AT HIGH RISK FOR INADEQUATE NUTRITIONAL INTAKE: ICD-10-CM

## 2023-02-13 DIAGNOSIS — Z79.4 TYPE 2 DIABETES MELLITUS WITH BOTH EYES AFFECTED BY MODERATE NONPROLIFERATIVE RETINOPATHY AND MACULAR EDEMA, WITH LONG-TERM CURRENT USE OF INSULIN: ICD-10-CM

## 2023-02-13 DIAGNOSIS — L97.912 NON-PRESSURE CHRONIC ULCER OF RIGHT LOWER LEG WITH FAT LAYER EXPOSED: Primary | ICD-10-CM

## 2023-02-13 DIAGNOSIS — Z87.2 HISTORY OF ULCER OF LOWER EXTREMITY: ICD-10-CM

## 2023-02-13 DIAGNOSIS — L97.812 VENOUS STASIS ULCER OF OTHER PART OF RIGHT LOWER LEG WITH FAT LAYER EXPOSED, UNSPECIFIED WHETHER VARICOSE VEINS PRESENT: ICD-10-CM

## 2023-02-13 DIAGNOSIS — R60.0 BILATERAL LOWER EXTREMITY EDEMA: ICD-10-CM

## 2023-02-13 DIAGNOSIS — R23.4 FISSURE IN SKIN OF BOTH FEET: ICD-10-CM

## 2023-02-13 DIAGNOSIS — L97.813 VENOUS STASIS ULCER OF OTHER PART OF RIGHT LOWER LEG WITH NECROSIS OF MUSCLE, UNSPECIFIED WHETHER VARICOSE VEINS PRESENT: ICD-10-CM

## 2023-02-13 DIAGNOSIS — Z79.4 TYPE 2 DIABETES MELLITUS WITH DIABETIC NEPHROPATHY, WITH LONG-TERM CURRENT USE OF INSULIN: ICD-10-CM

## 2023-02-13 DIAGNOSIS — I83.018 VENOUS STASIS ULCER OF OTHER PART OF RIGHT LOWER LEG WITH NECROSIS OF MUSCLE, UNSPECIFIED WHETHER VARICOSE VEINS PRESENT: ICD-10-CM

## 2023-02-13 DIAGNOSIS — E11.3313 TYPE 2 DIABETES MELLITUS WITH BOTH EYES AFFECTED BY MODERATE NONPROLIFERATIVE RETINOPATHY AND MACULAR EDEMA, WITH LONG-TERM CURRENT USE OF INSULIN: ICD-10-CM

## 2023-02-13 DIAGNOSIS — I83.018 VENOUS STASIS ULCER OF OTHER PART OF RIGHT LOWER LEG WITH FAT LAYER EXPOSED, UNSPECIFIED WHETHER VARICOSE VEINS PRESENT: ICD-10-CM

## 2023-02-13 DIAGNOSIS — E11.65 TYPE 2 DIABETES MELLITUS WITH HYPERGLYCEMIA, UNSPECIFIED WHETHER LONG TERM INSULIN USE: ICD-10-CM

## 2023-02-13 DIAGNOSIS — E11.21 TYPE 2 DIABETES MELLITUS WITH DIABETIC NEPHROPATHY, WITH LONG-TERM CURRENT USE OF INSULIN: ICD-10-CM

## 2023-02-13 DIAGNOSIS — L97.913 NON-PRESSURE CHRONIC ULCER OF RIGHT LOWER LEG WITH NECROSIS OF MUSCLE: ICD-10-CM

## 2023-02-13 PROCEDURE — 1159F PR MEDICATION LIST DOCUMENTED IN MEDICAL RECORD: ICD-10-PCS | Mod: HCNC,CPTII,, | Performed by: PODIATRIST

## 2023-02-13 PROCEDURE — 1160F RVW MEDS BY RX/DR IN RCRD: CPT | Mod: HCNC,CPTII,, | Performed by: PODIATRIST

## 2023-02-13 PROCEDURE — 1159F MED LIST DOCD IN RCRD: CPT | Mod: HCNC,CPTII,, | Performed by: PODIATRIST

## 2023-02-13 PROCEDURE — 99499 UNLISTED E&M SERVICE: CPT | Mod: HCNC,,, | Performed by: PODIATRIST

## 2023-02-13 PROCEDURE — 4010F PR ACE/ARB THEARPY RXD/TAKEN: ICD-10-PCS | Mod: HCNC,CPTII,, | Performed by: PODIATRIST

## 2023-02-13 PROCEDURE — 3077F SYST BP >= 140 MM HG: CPT | Mod: HCNC,CPTII,, | Performed by: PODIATRIST

## 2023-02-13 PROCEDURE — 3077F PR MOST RECENT SYSTOLIC BLOOD PRESSURE >= 140 MM HG: ICD-10-PCS | Mod: HCNC,CPTII,, | Performed by: PODIATRIST

## 2023-02-13 PROCEDURE — 4010F ACE/ARB THERAPY RXD/TAKEN: CPT | Mod: HCNC,CPTII,, | Performed by: PODIATRIST

## 2023-02-13 PROCEDURE — 3080F PR MOST RECENT DIASTOLIC BLOOD PRESSURE >= 90 MM HG: ICD-10-PCS | Mod: HCNC,CPTII,, | Performed by: PODIATRIST

## 2023-02-13 PROCEDURE — 1160F PR REVIEW ALL MEDS BY PRESCRIBER/CLIN PHARMACIST DOCUMENTED: ICD-10-PCS | Mod: HCNC,CPTII,, | Performed by: PODIATRIST

## 2023-02-13 PROCEDURE — 99214 OFFICE O/P EST MOD 30 MIN: CPT | Mod: HCNC,,, | Performed by: PODIATRIST

## 2023-02-13 PROCEDURE — 99499 RISK ADDL DX/OHS AUDIT: ICD-10-PCS | Mod: HCNC,,, | Performed by: PODIATRIST

## 2023-02-13 PROCEDURE — 97602 WOUND(S) CARE NON-SELECTIVE: CPT | Mod: HCNC | Performed by: PODIATRIST

## 2023-02-13 PROCEDURE — 99214 PR OFFICE/OUTPT VISIT, EST, LEVL IV, 30-39 MIN: ICD-10-PCS | Mod: HCNC,,, | Performed by: PODIATRIST

## 2023-02-13 PROCEDURE — 3080F DIAST BP >= 90 MM HG: CPT | Mod: HCNC,CPTII,, | Performed by: PODIATRIST

## 2023-02-14 NOTE — PROGRESS NOTES
[address] [facility] [address]  [place] [address]  Phone: [phone]   [phone]    Patient's PCP:Cristina Ren MD  Referring Provider: No ref. provider found    Subjective:      Chief Complaint:: Non-healing Wound (leg), Venous Stasis, Venous Ulcer, Diabetes, Leg Swelling, Wound Check, and Wound Care    HPI     Patient presents for follow-up of right anterior superior lower leg wound.     Additionally, patient presents with dry skin and heel fissuring bilateral feet.     Steff Johnson is a 62 y.o. female who presents today with a complaint of right lower leg venous ulcers lasting for approximately 1 week.  See Wound docs for full assessment and evaluation all right lower leg venous ulcers present.     1. See Wound Docs for assessment of wounds and procedure notes  2. Continue taking all medications as prescribed  3. Continue home health dressing changes  4. RTC one week   5. Counseled patient on increasing protein intake, not getting wound wet, keeping dressing clean dry and intact, following a healthy diet, elevating legs when able, removing pressure from wound     Total time spent for E&M 35  Total time for debridement 35 minutes       Athlete's foot counseling: Counseled patient that it is important to keep the feet dry. Use absorbent cotton socks and change them if they become sweaty. Or wear an open-toe shoe or sandal. Wash the feet at least once a day with soap and water. Apply the antifungal cream as prescribed. Recommend spray antiperspirant to the feet. Some antifungal creams are available without a prescription. It may take a week before the rash starts to improve. It can take about 3 to 4 weeks to completely clear. Continue the medicine until the rash is all gone. Use over-the-counter antifungal powders or sprays on your feet after exposure to high-risk environments, such as public showers, gyms, and locker rooms. This can help prevent future infections. Wearing appropriate shoes in these  situations can help.        Fungal infection of skin explained. Treatment options including no treatment, topical medications, oral medications were discussed, as well as success rates and risks of recurrence. We agreed on topical medication   Lotrisone prescribed     Counseling/Education:  I provided patient education verbally regarding:   The aspects of diabetes and how it pertains to the feet. I explained the importance of proper diabetic foot care and how it is essential for the health of their feet.     I discussed the importance of knowing their Hemoglobin A1c and that the level needs to be as close to 6 as possible. I discussed the increase complications of high blood sugar including stroke, blindness, heart attack, kidney failure and loss of limb secondary to neuropathy and PVD.      With neuropathy, beware of any breaks in the skin or redness. These areas are not recognized early due to the numbness.     I discussed Diabetes, lower back issues, metabolic disorders, systemic causes, chemotherapy, vitamin deficiency, heavy metal exposure, as some of the causes. I also explained that as much as 40% of the time we can not find a cause. I discussed different treatments available to control the symptoms but which may not cure the problem.         Counseled patient on the aspects of diabetes and how it pertains to the feet.  I explained the importance of proper diabetic foot care and how it is essential for the health of their feet.        Shoe inspection. Patient instructed on proper foot hygeine. We discussed wearing proper shoe gear, daily foot inspections, never walking without protective shoe gear, never putting sharp instruments to feet, routine podiatric nail visits every 2-3 months.          I counseled the patient on their conditions, their implications and medical management.     >50% of this > 60 minute visit was spent face to face educating/counseling the patient     I spent a total of 60 minutes on  the day of the visit.This includes face to face time and non-face to face time preparing to see the patient (eg, review of tests), obtaining and/or reviewing separately obtained history, documenting clinical information in the electronic or other health record, independently interpreting results and communicating results to the patient/family/caregiver, or care coordinator.        Patient should call the office immediately if any signs of infection, such as fever, chills, sweats, increased redness or pain.     Patient was instructed to call the clinic or go to the emergency department if their symptoms do not improve, worsens, or if new symptoms develop.  Patient was advised that if any increased swelling, pain, or numbness arise to go immediately to the ED. Patient knows to call any time if an emergency arises. Shared decision making occurred and patient verbalized understanding in agreement with this plan.      Much of the documentation for this visit was completed in the Wound Docs system.  Please see the attached documentation for further details about the patient's care. Scanned under the Media tab.         Dorys Crespo DPM     Vitals:    02/13/23 1400   BP: (!) 140/98   Pulse: 89   Resp: 18   Temp: 98.3 °F (36.8 °C)   PainSc: 0-No pain      Shoe Size:     Past Surgical History:   Procedure Laterality Date    COLONOSCOPY N/A 4/11/2017    Procedure: COLONOSCOPY;  Surgeon: Jared Antonio MD;  Location: [facility];  Service: Endoscopy;  Laterality: N/A;    HYSTERECTOMY      OOPHORECTOMY      SHOULDER SURGERY      R     Past Medical History:   Diagnosis Date    Arthritis     Complete tear of left rotator cuff 11/09/2017    COPD (chronic obstructive pulmonary disease)     COVID-19 infection 07/02/2021    Diabetes mellitus     H/o Cerebrovascular accident (CVA) of left pontine structure 12/12/2019    Hypertension     Personal history of colonic polyps     Stroke     Wears glasses      Family History   Problem  Relation Age of Onset    Diabetes Mother     Asthma Mother     Hypertension Mother     Diabetes Father     Diabetes Brother     Hypertension Brother     Anemia Daughter     Glaucoma Neg Hx     Macular degeneration Neg Hx     Retinal detachment Neg Hx         Social History:   Marital Status: Single  Alcohol History:  reports no history of alcohol use.  Tobacco History:  reports that she has never smoked. She has never used smokeless tobacco.  Drug History:  reports no history of drug use.    Review of patient's allergies indicates:  No Known Allergies    Current Outpatient Medications   Medication Sig Dispense Refill    atorvastatin (LIPITOR) 40 MG tablet Take 1 tablet (40 mg total) by mouth once daily. 90 tablet 1    blood glucose control, low Soln Use as directed (Patient taking differently: 1 each by Misc.(Non-Drug; Combo Route) route. Use as directed) 1 each 1    blood sugar diagnostic (TRUE METRIX GLUCOSE TEST STRIP) Strp Test blood sugar twice daily 200 strip 3    brimonidine 0.2% (ALPHAGAN) 0.2 % Drop Place 1 drop into both eyes 3 (three) times daily. 15 mL 3    cloNIDine 0.1 mg/24 hr td ptwk (CATAPRES) 0.1 mg/24 hr Place 1 patch onto the skin every 7 days. 12 patch 1    clopidogreL (PLAVIX) 75 mg tablet Take 1 tablet (75 mg total) by mouth once daily. 90 tablet 1    ergocalciferol (ERGOCALCIFEROL) 50,000 unit Cap Take 1 capsule (50,000 Units total) by mouth every 7 days. 12 capsule 1    hydrALAZINE (APRESOLINE) 100 MG tablet Take 1 tablet (100 mg total) by mouth 3 (three) times daily. 270 tablet 1    HYDROcodone-acetaminophen (NORCO)  mg per tablet       insulin aspart U-100 (NOVOLOG FLEXPEN U-100 INSULIN) 100 unit/mL (3 mL) InPn pen Inject 14 Units subcutaneously into the skin 3 (three) times daily before meals. 45 mL 3    insulin degludec (TRESIBA FLEXTOUCH U-100) 100 unit/mL (3 mL) insulin pen ADMINISTER 40 UNITS UNDER THE SKIN EVERY EVENING 12 pen 1    lancets Misc 1 lancet by Misc.(Non-Drug; Combo  "Route) route 2 (two) times a day. To check BG 2 times daily, to use with insurance preferred meter 200 each 3    lisinopriL 10 MG tablet 1 tablet once daily.      losartan (COZAAR) 50 MG tablet Take 50 mg by mouth.      metoprolol succinate (TOPROL-XL) 100 MG 24 hr tablet Take 1 tablet (100 mg total) by mouth once daily. 90 tablet 1    pen needle, diabetic (BD ULTRA-FINE SHORT PEN NEEDLE) 31 gauge x 5/16" Ndle 1 pen by Misc.(Non-Drug; Combo Route) route 4 (four) times daily. 300 each 5    tiotropium (SPIRIVA) 18 mcg inhalation capsule Inhale 1 capsule (18 mcg total) into the lungs once daily. Controller 90 capsule 3    valsartan-hydrochlorothiazide (DIOVAN-HCT) 160-25 mg per tablet Take 1 tablet by mouth once daily. 90 tablet 1     No current facility-administered medications for this visit.       Review of Systems   Constitutional:  Negative for chills, fatigue, fever and unexpected weight change.   HENT:  Negative for hearing loss and trouble swallowing.    Eyes:  Negative for photophobia and visual disturbance.   Respiratory:  Negative for cough, shortness of breath and wheezing.    Cardiovascular:  Positive for leg swelling. Negative for chest pain and palpitations.   Gastrointestinal:  Negative for abdominal pain and nausea.   Genitourinary:  Negative for dysuria and frequency.   Musculoskeletal:  Negative for arthralgias, back pain, gait problem, joint swelling and myalgias.   Skin:  Positive for wound. Negative for rash.   Neurological:  Negative for tremors, seizures, weakness, numbness and headaches.   Hematological:  Does not bruise/bleed easily.       Objective:        Physical Exam:   Foot Exam  Physical Exam  Ortho/SPM Exam     Imaging:            Assessment:       1. Non-pressure chronic ulcer of right lower leg with fat layer exposed    2. Type 2 diabetes mellitus with diabetic nephropathy, with long-term current use of insulin    3. 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 Bilateral lower extremity edema    5. Venous stasis ulcer of other part of right lower leg with necrosis of muscle, unspecified whether varicose veins present    6. 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 History of ulcer of lower extremity    8. Type 2 diabetes mellitus with hyperglycemia, unspecified whether long term insulin use    9. At high risk for inadequate nutritional intake    10. Venous stasis ulcer of other part of right lower leg with fat layer exposed, unspecified whether varicose veins present    11. Tinea pedis of both feet    12. Fissure in skin of both feet    13. 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 moderate nonproliferative retinopathy and macular edema, with long-term current use of insulin    Bilateral lower extremity edema    Venous stasis ulcer of other part of right lower leg with necrosis of muscle, unspecified whether varicose veins present    Non-pressure chronic ulcer of right lower leg with necrosis of muscle    History of ulcer of lower extremity    Type 2 diabetes mellitus with hyperglycemia, unspecified whether long term insulin use    At high risk for inadequate nutritional intake    Venous stasis ulcer of other part of right lower leg with fat layer exposed, unspecified whether varicose veins present    Tinea pedis of both feet    Fissure in skin of both feet    Comprehensive diabetic foot examination, type 2 DM, encounter for      Follow up in about 2 weeks (around 2/27/2023).    Procedures          Counseling:     I provided patient education verbally regarding:   Patient diagnosis, treatment  options, as well as alternatives, risks, and benefits.     This note was created using Dragon voice recognition software that occasionally misinterpreted phrases or words.

## 2023-02-16 ENCOUNTER — OFFICE VISIT (OUTPATIENT)
Dept: FAMILY MEDICINE | Facility: CLINIC | Age: 63
End: 2023-02-16
Payer: MEDICARE

## 2023-02-16 VITALS
HEIGHT: 59 IN | HEART RATE: 63 BPM | TEMPERATURE: 99 F | BODY MASS INDEX: 27.82 KG/M2 | SYSTOLIC BLOOD PRESSURE: 160 MMHG | WEIGHT: 138 LBS | DIASTOLIC BLOOD PRESSURE: 100 MMHG

## 2023-02-16 DIAGNOSIS — I63.9 CEREBROVASCULAR ACCIDENT (CVA) OF LEFT PONTINE STRUCTURE: ICD-10-CM

## 2023-02-16 DIAGNOSIS — E78.2 MIXED HYPERLIPIDEMIA: ICD-10-CM

## 2023-02-16 DIAGNOSIS — E55.9 VITAMIN D DEFICIENCY: ICD-10-CM

## 2023-02-16 DIAGNOSIS — I10 HYPERTENSION, UNCONTROLLED: ICD-10-CM

## 2023-02-16 DIAGNOSIS — Z79.4 TYPE 2 DIABETES MELLITUS WITH DIABETIC NEPHROPATHY, WITH LONG-TERM CURRENT USE OF INSULIN: ICD-10-CM

## 2023-02-16 DIAGNOSIS — J44.9 CHRONIC OBSTRUCTIVE PULMONARY DISEASE, UNSPECIFIED COPD TYPE: ICD-10-CM

## 2023-02-16 DIAGNOSIS — E11.21 TYPE 2 DIABETES MELLITUS WITH DIABETIC NEPHROPATHY, WITH LONG-TERM CURRENT USE OF INSULIN: ICD-10-CM

## 2023-02-16 DIAGNOSIS — I10 UNCONTROLLED HYPERTENSION: ICD-10-CM

## 2023-02-16 PROCEDURE — 3060F PR POS MICROALBUMINURIA RESULT DOCUMENTED/REVIEW: ICD-10-PCS | Mod: HCNC,CPTII,S$GLB, | Performed by: FAMILY MEDICINE

## 2023-02-16 PROCEDURE — 3060F POS MICROALBUMINURIA REV: CPT | Mod: HCNC,CPTII,S$GLB, | Performed by: FAMILY MEDICINE

## 2023-02-16 PROCEDURE — 1101F PR PT FALLS ASSESS DOC 0-1 FALLS W/OUT INJ PAST YR: ICD-10-PCS | Mod: HCNC,CPTII,S$GLB, | Performed by: FAMILY MEDICINE

## 2023-02-16 PROCEDURE — 99214 PR OFFICE/OUTPT VISIT, EST, LEVL IV, 30-39 MIN: ICD-10-PCS | Mod: HCNC,S$GLB,, | Performed by: FAMILY MEDICINE

## 2023-02-16 PROCEDURE — 99999 PR PBB SHADOW E&M-EST. PATIENT-LVL III: CPT | Mod: PBBFAC,HCNC,, | Performed by: FAMILY MEDICINE

## 2023-02-16 PROCEDURE — 3066F NEPHROPATHY DOC TX: CPT | Mod: HCNC,CPTII,S$GLB, | Performed by: FAMILY MEDICINE

## 2023-02-16 PROCEDURE — 3080F DIAST BP >= 90 MM HG: CPT | Mod: HCNC,CPTII,S$GLB, | Performed by: FAMILY MEDICINE

## 2023-02-16 PROCEDURE — 3077F SYST BP >= 140 MM HG: CPT | Mod: HCNC,CPTII,S$GLB, | Performed by: FAMILY MEDICINE

## 2023-02-16 PROCEDURE — 3052F PR MOST RECENT HEMOGLOBIN A1C LEVEL 8.0 - < 9.0%: ICD-10-PCS | Mod: HCNC,CPTII,S$GLB, | Performed by: FAMILY MEDICINE

## 2023-02-16 PROCEDURE — 3077F PR MOST RECENT SYSTOLIC BLOOD PRESSURE >= 140 MM HG: ICD-10-PCS | Mod: HCNC,CPTII,S$GLB, | Performed by: FAMILY MEDICINE

## 2023-02-16 PROCEDURE — 3288F FALL RISK ASSESSMENT DOCD: CPT | Mod: HCNC,CPTII,S$GLB, | Performed by: FAMILY MEDICINE

## 2023-02-16 PROCEDURE — 99214 OFFICE O/P EST MOD 30 MIN: CPT | Mod: HCNC,S$GLB,, | Performed by: FAMILY MEDICINE

## 2023-02-16 PROCEDURE — 1160F RVW MEDS BY RX/DR IN RCRD: CPT | Mod: HCNC,CPTII,S$GLB, | Performed by: FAMILY MEDICINE

## 2023-02-16 PROCEDURE — 99999 PR PBB SHADOW E&M-EST. PATIENT-LVL III: ICD-10-PCS | Mod: PBBFAC,HCNC,, | Performed by: FAMILY MEDICINE

## 2023-02-16 PROCEDURE — 1160F PR REVIEW ALL MEDS BY PRESCRIBER/CLIN PHARMACIST DOCUMENTED: ICD-10-PCS | Mod: HCNC,CPTII,S$GLB, | Performed by: FAMILY MEDICINE

## 2023-02-16 PROCEDURE — 4010F ACE/ARB THERAPY RXD/TAKEN: CPT | Mod: HCNC,CPTII,S$GLB, | Performed by: FAMILY MEDICINE

## 2023-02-16 PROCEDURE — 3008F PR BODY MASS INDEX (BMI) DOCUMENTED: ICD-10-PCS | Mod: HCNC,CPTII,S$GLB, | Performed by: FAMILY MEDICINE

## 2023-02-16 PROCEDURE — 3052F HG A1C>EQUAL 8.0%<EQUAL 9.0%: CPT | Mod: HCNC,CPTII,S$GLB, | Performed by: FAMILY MEDICINE

## 2023-02-16 PROCEDURE — 3008F BODY MASS INDEX DOCD: CPT | Mod: HCNC,CPTII,S$GLB, | Performed by: FAMILY MEDICINE

## 2023-02-16 PROCEDURE — 3288F PR FALLS RISK ASSESSMENT DOCUMENTED: ICD-10-PCS | Mod: HCNC,CPTII,S$GLB, | Performed by: FAMILY MEDICINE

## 2023-02-16 PROCEDURE — 1159F MED LIST DOCD IN RCRD: CPT | Mod: HCNC,CPTII,S$GLB, | Performed by: FAMILY MEDICINE

## 2023-02-16 PROCEDURE — 1101F PT FALLS ASSESS-DOCD LE1/YR: CPT | Mod: HCNC,CPTII,S$GLB, | Performed by: FAMILY MEDICINE

## 2023-02-16 PROCEDURE — 4010F PR ACE/ARB THEARPY RXD/TAKEN: ICD-10-PCS | Mod: HCNC,CPTII,S$GLB, | Performed by: FAMILY MEDICINE

## 2023-02-16 PROCEDURE — 1159F PR MEDICATION LIST DOCUMENTED IN MEDICAL RECORD: ICD-10-PCS | Mod: HCNC,CPTII,S$GLB, | Performed by: FAMILY MEDICINE

## 2023-02-16 PROCEDURE — 3080F PR MOST RECENT DIASTOLIC BLOOD PRESSURE >= 90 MM HG: ICD-10-PCS | Mod: HCNC,CPTII,S$GLB, | Performed by: FAMILY MEDICINE

## 2023-02-16 PROCEDURE — 3066F PR DOCUMENTATION OF TREATMENT FOR NEPHROPATHY: ICD-10-PCS | Mod: HCNC,CPTII,S$GLB, | Performed by: FAMILY MEDICINE

## 2023-02-16 RX ORDER — TIOTROPIUM BROMIDE 18 UG/1
18 CAPSULE ORAL; RESPIRATORY (INHALATION) DAILY
Qty: 90 CAPSULE | Refills: 3 | Status: ON HOLD | OUTPATIENT
Start: 2023-02-16 | End: 2023-08-16 | Stop reason: SDUPTHER

## 2023-02-16 RX ORDER — METOPROLOL SUCCINATE 100 MG/1
100 TABLET, EXTENDED RELEASE ORAL DAILY
Qty: 90 TABLET | Refills: 1 | Status: ON HOLD | OUTPATIENT
Start: 2023-02-16 | End: 2023-08-16 | Stop reason: HOSPADM

## 2023-02-16 RX ORDER — CLOPIDOGREL BISULFATE 75 MG/1
75 TABLET ORAL DAILY
Qty: 90 TABLET | Refills: 1 | Status: ON HOLD | OUTPATIENT
Start: 2023-02-16 | End: 2023-05-05 | Stop reason: HOSPADM

## 2023-02-16 RX ORDER — CLONIDINE 0.1 MG/24H
1 PATCH, EXTENDED RELEASE TRANSDERMAL
Qty: 12 PATCH | Refills: 1 | Status: SHIPPED | OUTPATIENT
Start: 2023-02-16 | End: 2023-05-12

## 2023-02-16 RX ORDER — INSULIN DEGLUDEC 100 U/ML
INJECTION, SOLUTION SUBCUTANEOUS
Qty: 12 PEN | Refills: 1 | Status: SHIPPED | OUTPATIENT
Start: 2023-02-16 | End: 2023-05-12 | Stop reason: SDUPTHER

## 2023-02-16 RX ORDER — ERGOCALCIFEROL 1.25 MG/1
50000 CAPSULE ORAL
Qty: 12 CAPSULE | Refills: 1 | Status: SHIPPED | OUTPATIENT
Start: 2023-02-16 | End: 2023-04-27

## 2023-02-16 RX ORDER — HYDRALAZINE HYDROCHLORIDE 100 MG/1
100 TABLET, FILM COATED ORAL 3 TIMES DAILY
Qty: 270 TABLET | Refills: 1 | Status: ON HOLD | OUTPATIENT
Start: 2023-02-16 | End: 2023-08-11 | Stop reason: HOSPADM

## 2023-02-16 RX ORDER — LANCETS
1 EACH MISCELLANEOUS 2 TIMES DAILY
Qty: 200 EACH | Refills: 3 | Status: ON HOLD | OUTPATIENT
Start: 2023-02-16 | End: 2023-04-28 | Stop reason: HOSPADM

## 2023-02-16 RX ORDER — PEN NEEDLE, DIABETIC 30 GX3/16"
1 NEEDLE, DISPOSABLE MISCELLANEOUS 4 TIMES DAILY
Qty: 300 EACH | Refills: 5 | Status: ON HOLD | OUTPATIENT
Start: 2023-02-16 | End: 2023-07-05 | Stop reason: HOSPADM

## 2023-02-16 RX ORDER — INSULIN ASPART 100 [IU]/ML
14 INJECTION, SOLUTION INTRAVENOUS; SUBCUTANEOUS
Qty: 45 ML | Refills: 3 | Status: ON HOLD | OUTPATIENT
Start: 2023-02-16 | End: 2023-04-28 | Stop reason: HOSPADM

## 2023-02-16 RX ORDER — LOSARTAN POTASSIUM 50 MG/1
50 TABLET ORAL DAILY
Qty: 90 TABLET | Refills: 1 | Status: ON HOLD | OUTPATIENT
Start: 2023-02-16 | End: 2023-05-05 | Stop reason: SDUPTHER

## 2023-02-16 RX ORDER — ATORVASTATIN CALCIUM 40 MG/1
40 TABLET, FILM COATED ORAL DAILY
Qty: 90 TABLET | Refills: 1 | Status: ON HOLD | OUTPATIENT
Start: 2023-02-16 | End: 2023-05-05 | Stop reason: SDUPTHER

## 2023-02-16 NOTE — PROGRESS NOTES
Subjective:       Patient ID: Steff Johnson is a 63 y.o. female.    Chief Complaint: Follow-up (2mth f/u)    HPI  Review of Systems   Constitutional:  Negative for fatigue and unexpected weight change.   Respiratory:  Negative for chest tightness and shortness of breath.    Cardiovascular:  Negative for chest pain, palpitations and leg swelling.   Gastrointestinal:  Negative for abdominal pain.   Musculoskeletal:  Negative for arthralgias.   Neurological:  Negative for dizziness, syncope, light-headedness and headaches.     Patient Active Problem List   Diagnosis    Hypertension associated with diabetes    COPD (chronic obstructive pulmonary disease)    Hyperglycemia due to type 2 diabetes mellitus    Proteinuria    Complete tear of left rotator cuff    Left shoulder pain    Shoulder stiffness, left    Shoulder weakness    Type 2 diabetes mellitus with diabetic nephropathy    Mixed hyperlipidemia    Type 2 diabetes mellitus with both eyes affected by moderate nonproliferative retinopathy and macular edema, with long-term current use of insulin    H/o Cerebrovascular accident (CVA) of left pontine structure    H/o Cranial nerve III palsy, left    Gait abnormality    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    Reviewed labs 12/2022-low vit D  Dexa 1/2023 osteopenia  Last visit with me 2/2022. Was supposed to come in for monthly appts but did not keep it. She is alone today  Has eye exam scheduled 2/9/2022 Dr. Barros     Wound Dr. Crespo following for right lower leg chronic venous stasis ulcer     Presents today  for check up. Significant other whom she  lives with Nish Marti sometimes comes with her. He is also unfamiliar with her meds and dosing but willing to help assist.  IIC signed 2/2022.   Was referred from pain mgmt to ortho Dr. Del Angel who wanted to give her a shot.        Admitted 5/2022 [facility] for right sided weakness and blurry vision. Catapres patch had fallen off few days ago. Had htn urgency 162-245 systolic.   CTA head was negative. CTA head and neck showed no clinically significant stenosis, large vessel occlusion or aneurysm.       Neuro Dr. Magana CVA -last appt 2/2022        Admitted [facility] 1/5/2022 for acute CVA -right sided weakness and slurred speech. Head imaging- New punctate hyperattenuating focus in the right basal ganglia suspicious for a small petechial bleed.       History:   Admitted 11/23/2021 [facility] for 1 day finger pain after fall. She is hypertensive 233/132 she was treated with hydralazine IV with improvement in her blood pressure. Found to have finger tip infection s/p I & D. Admitted for IV abx     Admitted 7/4/2021 for fall, difficulty with balance. She had come to emergency room couple days before and ER physician wanted to admit here however she decided to go home against medical advice.  She also admits that she is not taking her medications as prescribed and previous notes mentioned that she has a poor compliance with her antihypertensive regimen as well as insulin regimen.  In ED patient was found to have very elevated blood pressure, elevated troponin, and she was incidentally found to have COVID-19 infection.  Decision was made to admit her for right-sided weakness, frequent falls, elevated troponin. Hospital Course: She underwent extensive neurocardiac workup which showed micro hemorrhages.  She was cleared for discharge by Neurology on aspirin and statin.  It appears that patient has very poorly controlled hypertension, already has residual right-sided weakness, poor vision     Admitted 3/31/2021 [facility] after running out of medsd  in HTN urgency     Admitted [facility] 12/12/19 for left pontine lacunar infarct basilar artery verified by MRI brain. Sx of being off balance and eye problem. Echo normal.  Started on asa and plavix x 21 d then asa.  Sent home with HH and PT/OT/ST. Neuro Mushtaq Pattony.  Still having double vision and balance issues. Never got  Walker.      Endocrine Dr. Blanco A1c 8.9 -poorly controlled DM due to NC , vit d def.  Lipids at goal. taking tresiba 40 u at night and novolog 14u tid + correction per SS u with meals tid.  checking BS qid.      Ophth Dr. Rousseau/alden Wagner/Arnol Complications from DM-prolif retinopathy and nephropathy, ocular htn. Has established Dr. Barros      Nepheli Echols/Mary CKD stage 3, malignant HTN , low vit D.       Pain mgmt Dr. Costello treating chronic shoulder pain norco 10 tid. Dps checked no evidence of diversion        Objective:      Physical Exam  Vitals and nursing note reviewed.   Constitutional:       Appearance: She is well-developed.   Cardiovascular:      Rate and Rhythm: Normal rate and regular rhythm.      Heart sounds: Normal heart sounds.   Pulmonary:      Effort: Pulmonary effort is normal.      Breath sounds: Normal breath sounds.   Skin:     General: Skin is warm and dry.   Neurological:      Mental Status: She is alert and oriented to person, place, and time.       Assessment:       1. Uncontrolled hypertension    2. Hypertension, uncontrolled    3. H/o Cerebrovascular accident (CVA) of left pontine structure    4. Mixed hyperlipidemia    5. Type 2 diabetes mellitus with diabetic nephropathy, with long-term current use of insulin    6. Vitamin D deficiency    7. Chronic obstructive pulmonary disease, unspecified COPD type        Plan:         1. Uncontrolled hypertension  Increase to  - metoprolol succinate (TOPROL-XL) 100 MG 24 hr tablet; Take 1 tablet (100 mg total) by mouth once daily.  Dispense: 90 tablet; Refill: 1  continue  - cloNIDine 0.1 mg/24 hr td ptwk  "(CATAPRES) 0.1 mg/24 hr; Place 1 patch onto the skin every 7 days.  Dispense: 12 patch; Refill: 1  - Ambulatory referral/consult to Home Health; Future    2. Hypertension, uncontrolled  treat  - hydrALAZINE (APRESOLINE) 100 MG tablet; Take 1 tablet (100 mg total) by mouth 3 (three) times daily.  Dispense: 270 tablet; Refill: 1    3. H/o Cerebrovascular accident (CVA) of left pontine structure  continue  - clopidogreL (PLAVIX) 75 mg tablet; Take 1 tablet (75 mg total) by mouth once daily.  Dispense: 90 tablet; Refill: 1  - Ambulatory referral/consult to Home Health; Future    4. Mixed hyperlipidemia  Stable condition.  Continue current medications.  Will adjust based on lab findings or if condition changes.    - atorvastatin (LIPITOR) 40 MG tablet; Take 1 tablet (40 mg total) by mouth once daily.  Dispense: 90 tablet; Refill: 1  - CBC Auto Differential; Future  - Comprehensive Metabolic Panel; Future  - Lipid Panel; Future    5. Type 2 diabetes mellitus with diabetic nephropathy, with long-term current use of insulin  Stable condition.  Continue current medications.  Will adjust based on lab findings or if condition changes.    - blood sugar diagnostic (TRUE METRIX GLUCOSE TEST STRIP) Strp; Test blood sugar twice daily  Dispense: 200 strip; Refill: 3  - insulin degludec (TRESIBA FLEXTOUCH U-100) 100 unit/mL (3 mL) insulin pen; ADMINISTER 40 UNITS UNDER THE SKIN EVERY EVENING  Dispense: 12 pen; Refill: 1  - insulin aspart U-100 (NOVOLOG FLEXPEN U-100 INSULIN) 100 unit/mL (3 mL) InPn pen; Inject 14 Units subcutaneously into the skin 3 (three) times daily before meals.  Dispense: 45 mL; Refill: 3  - pen needle, diabetic (BD ULTRA-FINE SHORT PEN NEEDLE) 31 gauge x 5/16" Ndle; 1 pen by Misc.(Non-Drug; Combo Route) route 4 (four) times daily.  Dispense: 300 each; Refill: 5  - lancets Misc; 1 lancet by Misc.(Non-Drug; Combo Route) route 2 (two) times a day. To check BG 2 times daily, to use with insurance preferred meter  " Dispense: 200 each; Refill: 3  - Ambulatory referral/consult to Home Health; Future  - Hemoglobin A1C; Future  - Microalbumin/Creatinine Ratio, Urine; Future    6. Vitamin D deficiency  Screen and treat as indicated:    - ergocalciferol (ERGOCALCIFEROL) 50,000 unit Cap; Take 1 capsule (50,000 Units total) by mouth every 7 days.  Dispense: 12 capsule; Refill: 1    7. Chronic obstructive pulmonary disease, unspecified COPD type  treat  - tiotropium (SPIRIVA) 18 mcg inhalation capsule; Inhale 1 capsule (18 mcg total) into the lungs once daily. Controller  Dispense: 90 capsule; Refill: 3      Time spent with patient: 20 minutes    Patient with be reevaluated in 3 months or sooner prn    Greater than 50% of this visit was spent counseling as described in above documentation:Yes

## 2023-02-22 ENCOUNTER — LAB VISIT (OUTPATIENT)
Dept: LAB | Facility: HOSPITAL | Age: 63
End: 2023-02-22
Attending: FAMILY MEDICINE
Payer: MEDICARE

## 2023-02-22 DIAGNOSIS — E11.21 DIABETIC GLOMERULOPATHY: Primary | ICD-10-CM

## 2023-02-22 DIAGNOSIS — Z79.4 ENCOUNTER FOR LONG-TERM (CURRENT) USE OF INSULIN: ICD-10-CM

## 2023-02-22 LAB
ALBUMIN SERPL BCP-MCNC: 3.4 G/DL (ref 3.5–5.2)
ALBUMIN/CREAT UR: 195.8 UG/MG (ref 0–30)
ALP SERPL-CCNC: 77 U/L (ref 55–135)
ALT SERPL W/O P-5'-P-CCNC: 35 U/L (ref 10–44)
ANION GAP SERPL CALC-SCNC: 8 MMOL/L (ref 8–16)
AST SERPL-CCNC: 24 U/L (ref 10–40)
BASOPHILS # BLD AUTO: 0.05 K/UL (ref 0–0.2)
BASOPHILS NFR BLD: 0.6 % (ref 0–1.9)
BILIRUB SERPL-MCNC: 0.6 MG/DL (ref 0.1–1)
BUN SERPL-MCNC: 19 MG/DL (ref 8–23)
CALCIUM SERPL-MCNC: 9 MG/DL (ref 8.7–10.5)
CHLORIDE SERPL-SCNC: 104 MMOL/L (ref 95–110)
CHOLEST SERPL-MCNC: 156 MG/DL (ref 120–199)
CHOLEST/HDLC SERPL: 2.8 {RATIO} (ref 2–5)
CO2 SERPL-SCNC: 24 MMOL/L (ref 23–29)
CREAT SERPL-MCNC: 1.3 MG/DL (ref 0.5–1.4)
CREAT UR-MCNC: 286 MG/DL (ref 15–325)
DIFFERENTIAL METHOD: ABNORMAL
EOSINOPHIL # BLD AUTO: 0.3 K/UL (ref 0–0.5)
EOSINOPHIL NFR BLD: 4.1 % (ref 0–8)
ERYTHROCYTE [DISTWIDTH] IN BLOOD BY AUTOMATED COUNT: 14.6 % (ref 11.5–14.5)
EST. GFR  (NO RACE VARIABLE): 46 ML/MIN/1.73 M^2
ESTIMATED AVG GLUCOSE: 209 MG/DL (ref 68–131)
GLUCOSE SERPL-MCNC: 245 MG/DL (ref 70–110)
HBA1C MFR BLD: 8.9 % (ref 4–5.6)
HCT VFR BLD AUTO: 34.4 % (ref 37–48.5)
HDLC SERPL-MCNC: 56 MG/DL (ref 40–75)
HDLC SERPL: 35.9 % (ref 20–50)
HGB BLD-MCNC: 12.3 G/DL (ref 12–16)
IMM GRANULOCYTES # BLD AUTO: 0.09 K/UL (ref 0–0.04)
IMM GRANULOCYTES NFR BLD AUTO: 1.1 % (ref 0–0.5)
LDLC SERPL CALC-MCNC: 85.6 MG/DL (ref 63–159)
LYMPHOCYTES # BLD AUTO: 2.4 K/UL (ref 1–4.8)
LYMPHOCYTES NFR BLD: 29 % (ref 18–48)
MCH RBC QN AUTO: 28.1 PG (ref 27–31)
MCHC RBC AUTO-ENTMCNC: 35.8 G/DL (ref 32–36)
MCV RBC AUTO: 79 FL (ref 82–98)
MICROALBUMIN UR DL<=1MG/L-MCNC: 560 UG/ML
MONOCYTES # BLD AUTO: 1.1 K/UL (ref 0.3–1)
MONOCYTES NFR BLD: 13.5 % (ref 4–15)
NEUTROPHILS # BLD AUTO: 4.3 K/UL (ref 1.8–7.7)
NEUTROPHILS NFR BLD: 51.7 % (ref 38–73)
NONHDLC SERPL-MCNC: 100 MG/DL
NRBC BLD-RTO: 0 /100 WBC
PLATELET # BLD AUTO: 433 K/UL (ref 150–450)
PMV BLD AUTO: 10.4 FL (ref 9.2–12.9)
POTASSIUM SERPL-SCNC: 4.6 MMOL/L (ref 3.5–5.1)
PROT SERPL-MCNC: 7.5 G/DL (ref 6–8.4)
RBC # BLD AUTO: 4.38 M/UL (ref 4–5.4)
SODIUM SERPL-SCNC: 136 MMOL/L (ref 136–145)
TRIGL SERPL-MCNC: 72 MG/DL (ref 30–150)
WBC # BLD AUTO: 8.27 K/UL (ref 3.9–12.7)

## 2023-02-22 PROCEDURE — 80053 COMPREHEN METABOLIC PANEL: CPT | Mod: HCNC | Performed by: FAMILY MEDICINE

## 2023-02-22 PROCEDURE — 80061 LIPID PANEL: CPT | Mod: HCNC | Performed by: FAMILY MEDICINE

## 2023-02-22 PROCEDURE — 83036 HEMOGLOBIN GLYCOSYLATED A1C: CPT | Mod: HCNC | Performed by: FAMILY MEDICINE

## 2023-02-22 PROCEDURE — 82570 ASSAY OF URINE CREATININE: CPT | Mod: HCNC | Performed by: FAMILY MEDICINE

## 2023-02-22 PROCEDURE — 85025 COMPLETE CBC W/AUTO DIFF WBC: CPT | Mod: HCNC | Performed by: FAMILY MEDICINE

## 2023-02-23 ENCOUNTER — TELEPHONE (OUTPATIENT)
Dept: FAMILY MEDICINE | Facility: CLINIC | Age: 63
End: 2023-02-23

## 2023-02-24 ENCOUNTER — OFFICE VISIT (OUTPATIENT)
Dept: OPHTHALMOLOGY | Facility: CLINIC | Age: 63
End: 2023-02-24
Payer: MEDICARE

## 2023-02-24 DIAGNOSIS — Z79.4 TYPE 2 DIABETES MELLITUS WITH BOTH EYES AFFECTED BY MODERATE NONPROLIFERATIVE RETINOPATHY AND MACULAR EDEMA, WITH LONG-TERM CURRENT USE OF INSULIN: ICD-10-CM

## 2023-02-24 DIAGNOSIS — E11.3313 TYPE 2 DIABETES MELLITUS WITH BOTH EYES AFFECTED BY MODERATE NONPROLIFERATIVE RETINOPATHY AND MACULAR EDEMA, WITH LONG-TERM CURRENT USE OF INSULIN: ICD-10-CM

## 2023-02-24 DIAGNOSIS — H40.053 OCULAR HYPERTENSION, BILATERAL: Primary | ICD-10-CM

## 2023-02-24 DIAGNOSIS — H25.813 COMBINED FORMS OF AGE-RELATED CATARACT, BILATERAL: ICD-10-CM

## 2023-02-24 PROCEDURE — 1101F PR PT FALLS ASSESS DOC 0-1 FALLS W/OUT INJ PAST YR: ICD-10-PCS | Mod: HCNC,CPTII,S$GLB, | Performed by: OPHTHALMOLOGY

## 2023-02-24 PROCEDURE — 92133 CPTRZD OPH DX IMG PST SGM ON: CPT | Mod: HCNC,S$GLB,, | Performed by: OPHTHALMOLOGY

## 2023-02-24 PROCEDURE — 3060F PR POS MICROALBUMINURIA RESULT DOCUMENTED/REVIEW: ICD-10-PCS | Mod: HCNC,CPTII,S$GLB, | Performed by: OPHTHALMOLOGY

## 2023-02-24 PROCEDURE — 1159F PR MEDICATION LIST DOCUMENTED IN MEDICAL RECORD: ICD-10-PCS | Mod: HCNC,CPTII,S$GLB, | Performed by: OPHTHALMOLOGY

## 2023-02-24 PROCEDURE — 99999 PR PBB SHADOW E&M-EST. PATIENT-LVL III: CPT | Mod: PBBFAC,HCNC,, | Performed by: OPHTHALMOLOGY

## 2023-02-24 PROCEDURE — 92133 POSTERIOR SEGMENT OCT OPTIC NERVE(OCULAR COHERENCE TOMOGRAPHY) - OU - BOTH EYES: ICD-10-PCS | Mod: HCNC,S$GLB,, | Performed by: OPHTHALMOLOGY

## 2023-02-24 PROCEDURE — 4010F ACE/ARB THERAPY RXD/TAKEN: CPT | Mod: HCNC,CPTII,S$GLB, | Performed by: OPHTHALMOLOGY

## 2023-02-24 PROCEDURE — 3052F HG A1C>EQUAL 8.0%<EQUAL 9.0%: CPT | Mod: HCNC,CPTII,S$GLB, | Performed by: OPHTHALMOLOGY

## 2023-02-24 PROCEDURE — 99213 PR OFFICE/OUTPT VISIT, EST, LEVL III, 20-29 MIN: ICD-10-PCS | Mod: HCNC,S$GLB,, | Performed by: OPHTHALMOLOGY

## 2023-02-24 PROCEDURE — 3288F PR FALLS RISK ASSESSMENT DOCUMENTED: ICD-10-PCS | Mod: HCNC,CPTII,S$GLB, | Performed by: OPHTHALMOLOGY

## 2023-02-24 PROCEDURE — 1160F RVW MEDS BY RX/DR IN RCRD: CPT | Mod: HCNC,CPTII,S$GLB, | Performed by: OPHTHALMOLOGY

## 2023-02-24 PROCEDURE — 3066F NEPHROPATHY DOC TX: CPT | Mod: HCNC,CPTII,S$GLB, | Performed by: OPHTHALMOLOGY

## 2023-02-24 PROCEDURE — 99213 OFFICE O/P EST LOW 20 MIN: CPT | Mod: HCNC,S$GLB,, | Performed by: OPHTHALMOLOGY

## 2023-02-24 PROCEDURE — 1160F PR REVIEW ALL MEDS BY PRESCRIBER/CLIN PHARMACIST DOCUMENTED: ICD-10-PCS | Mod: HCNC,CPTII,S$GLB, | Performed by: OPHTHALMOLOGY

## 2023-02-24 PROCEDURE — 99999 PR PBB SHADOW E&M-EST. PATIENT-LVL III: ICD-10-PCS | Mod: PBBFAC,HCNC,, | Performed by: OPHTHALMOLOGY

## 2023-02-24 PROCEDURE — 3060F POS MICROALBUMINURIA REV: CPT | Mod: HCNC,CPTII,S$GLB, | Performed by: OPHTHALMOLOGY

## 2023-02-24 PROCEDURE — 4010F PR ACE/ARB THEARPY RXD/TAKEN: ICD-10-PCS | Mod: HCNC,CPTII,S$GLB, | Performed by: OPHTHALMOLOGY

## 2023-02-24 PROCEDURE — 3288F FALL RISK ASSESSMENT DOCD: CPT | Mod: HCNC,CPTII,S$GLB, | Performed by: OPHTHALMOLOGY

## 2023-02-24 PROCEDURE — 3052F PR MOST RECENT HEMOGLOBIN A1C LEVEL 8.0 - < 9.0%: ICD-10-PCS | Mod: HCNC,CPTII,S$GLB, | Performed by: OPHTHALMOLOGY

## 2023-02-24 PROCEDURE — 1159F MED LIST DOCD IN RCRD: CPT | Mod: HCNC,CPTII,S$GLB, | Performed by: OPHTHALMOLOGY

## 2023-02-24 PROCEDURE — 3066F PR DOCUMENTATION OF TREATMENT FOR NEPHROPATHY: ICD-10-PCS | Mod: HCNC,CPTII,S$GLB, | Performed by: OPHTHALMOLOGY

## 2023-02-24 PROCEDURE — 1101F PT FALLS ASSESS-DOCD LE1/YR: CPT | Mod: HCNC,CPTII,S$GLB, | Performed by: OPHTHALMOLOGY

## 2023-02-24 RX ORDER — BRIMONIDINE TARTRATE 2 MG/ML
1 SOLUTION/ DROPS OPHTHALMIC EVERY 12 HOURS
Qty: 15 ML | Refills: 11 | Status: ON HOLD | OUTPATIENT
Start: 2023-02-24 | End: 2023-04-28 | Stop reason: HOSPADM

## 2023-02-24 NOTE — PROGRESS NOTES
HPI     Follow-up     Additional comments: Overdue IOP check. Denies eye pain today states   vision is doing ok. Using Brimonidine OU TID but does not always get 3   drops in some times just once a day. States she did not use them this   morning          Last edited by Celeste Cheung on 2/24/2023 12:52 PM.            Assessment /Plan     For exam results, see Encounter Report.    Ocular hypertension, bilateral  -     brimonidine 0.2% (ALPHAGAN) 0.2 % Drop; Place 1 drop into both eyes every 12 (twelve) hours.  Dispense: 15 mL; Refill: 11    Type 2 diabetes mellitus with both eyes affected by moderate nonproliferative retinopathy and macular edema, with long-term current use of insulin    Combined forms of age-related cataract, bilateral      1. Ocular hypertension, bilateral  Thick pachy  +famhx     ONHs look healthy  OCT NFL concerning for mild progression OS  HVF was essentially normal  gonio open with light pigment  Tmax 37/36 with tonopen    IOP much better today with brimonidine  Target IOP low 20s    Continue brim bid OU    F/u 4 months, IOP check, DFE, BAT    2. 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 Barros    3. Combined forms of age-related cataract, bilateral  Consider extraction, consider migs

## 2023-02-27 ENCOUNTER — OFFICE VISIT (OUTPATIENT)
Dept: WOUND CARE | Facility: HOSPITAL | Age: 63
End: 2023-02-27
Attending: PODIATRIST
Payer: MEDICARE

## 2023-02-27 VITALS
SYSTOLIC BLOOD PRESSURE: 160 MMHG | HEART RATE: 65 BPM | TEMPERATURE: 98 F | RESPIRATION RATE: 17 BRPM | DIASTOLIC BLOOD PRESSURE: 98 MMHG

## 2023-02-27 DIAGNOSIS — E11.3313 TYPE 2 DIABETES MELLITUS WITH BOTH EYES AFFECTED BY MODERATE NONPROLIFERATIVE RETINOPATHY AND MACULAR EDEMA, WITH LONG-TERM CURRENT USE OF INSULIN: ICD-10-CM

## 2023-02-27 DIAGNOSIS — L97.812 VENOUS STASIS ULCER OF OTHER PART OF RIGHT LOWER LEG WITH FAT LAYER EXPOSED, UNSPECIFIED WHETHER VARICOSE VEINS PRESENT: ICD-10-CM

## 2023-02-27 DIAGNOSIS — L97.912 NON-PRESSURE CHRONIC ULCER OF RIGHT LOWER LEG WITH FAT LAYER EXPOSED: Primary | ICD-10-CM

## 2023-02-27 DIAGNOSIS — Z87.2 HISTORY OF ULCER OF LOWER EXTREMITY: ICD-10-CM

## 2023-02-27 DIAGNOSIS — L97.813 VENOUS STASIS ULCER OF OTHER PART OF RIGHT LOWER LEG WITH NECROSIS OF MUSCLE, UNSPECIFIED WHETHER VARICOSE VEINS PRESENT: ICD-10-CM

## 2023-02-27 DIAGNOSIS — E11.65 TYPE 2 DIABETES MELLITUS WITH HYPERGLYCEMIA, UNSPECIFIED WHETHER LONG TERM INSULIN USE: ICD-10-CM

## 2023-02-27 DIAGNOSIS — Z79.4 TYPE 2 DIABETES MELLITUS WITH BOTH EYES AFFECTED BY MODERATE NONPROLIFERATIVE RETINOPATHY AND MACULAR EDEMA, WITH LONG-TERM CURRENT USE OF INSULIN: ICD-10-CM

## 2023-02-27 DIAGNOSIS — I83.018 VENOUS STASIS ULCER OF OTHER PART OF RIGHT LOWER LEG WITH FAT LAYER EXPOSED, UNSPECIFIED WHETHER VARICOSE VEINS PRESENT: ICD-10-CM

## 2023-02-27 DIAGNOSIS — Z79.4 TYPE 2 DIABETES MELLITUS WITH DIABETIC NEPHROPATHY, WITH LONG-TERM CURRENT USE OF INSULIN: ICD-10-CM

## 2023-02-27 DIAGNOSIS — E11.21 TYPE 2 DIABETES MELLITUS WITH DIABETIC NEPHROPATHY, WITH LONG-TERM CURRENT USE OF INSULIN: ICD-10-CM

## 2023-02-27 DIAGNOSIS — L97.913 NON-PRESSURE CHRONIC ULCER OF RIGHT LOWER LEG WITH NECROSIS OF MUSCLE: ICD-10-CM

## 2023-02-27 DIAGNOSIS — Z91.89 AT HIGH RISK FOR INADEQUATE NUTRITIONAL INTAKE: ICD-10-CM

## 2023-02-27 DIAGNOSIS — R60.0 BILATERAL LOWER EXTREMITY EDEMA: ICD-10-CM

## 2023-02-27 DIAGNOSIS — I83.018 VENOUS STASIS ULCER OF OTHER PART OF RIGHT LOWER LEG WITH NECROSIS OF MUSCLE, UNSPECIFIED WHETHER VARICOSE VEINS PRESENT: ICD-10-CM

## 2023-02-27 PROCEDURE — 3077F SYST BP >= 140 MM HG: CPT | Mod: HCNC,CPTII,, | Performed by: PODIATRIST

## 2023-02-27 PROCEDURE — 99214 PR OFFICE/OUTPT VISIT, EST, LEVL IV, 30-39 MIN: ICD-10-PCS | Mod: HCNC,,, | Performed by: PODIATRIST

## 2023-02-27 PROCEDURE — 1160F RVW MEDS BY RX/DR IN RCRD: CPT | Mod: HCNC,CPTII,, | Performed by: PODIATRIST

## 2023-02-27 PROCEDURE — 3080F PR MOST RECENT DIASTOLIC BLOOD PRESSURE >= 90 MM HG: ICD-10-PCS | Mod: HCNC,CPTII,, | Performed by: PODIATRIST

## 2023-02-27 PROCEDURE — 3052F HG A1C>EQUAL 8.0%<EQUAL 9.0%: CPT | Mod: HCNC,CPTII,, | Performed by: PODIATRIST

## 2023-02-27 PROCEDURE — 99499 RISK ADDL DX/OHS AUDIT: ICD-10-PCS | Mod: HCNC,,, | Performed by: PODIATRIST

## 2023-02-27 PROCEDURE — 4010F ACE/ARB THERAPY RXD/TAKEN: CPT | Mod: HCNC,CPTII,, | Performed by: PODIATRIST

## 2023-02-27 PROCEDURE — 1159F PR MEDICATION LIST DOCUMENTED IN MEDICAL RECORD: ICD-10-PCS | Mod: HCNC,CPTII,, | Performed by: PODIATRIST

## 2023-02-27 PROCEDURE — 99214 OFFICE O/P EST MOD 30 MIN: CPT | Mod: HCNC | Performed by: PODIATRIST

## 2023-02-27 PROCEDURE — 3077F PR MOST RECENT SYSTOLIC BLOOD PRESSURE >= 140 MM HG: ICD-10-PCS | Mod: HCNC,CPTII,, | Performed by: PODIATRIST

## 2023-02-27 PROCEDURE — 99499 UNLISTED E&M SERVICE: CPT | Mod: HCNC,,, | Performed by: PODIATRIST

## 2023-02-27 PROCEDURE — 3066F PR DOCUMENTATION OF TREATMENT FOR NEPHROPATHY: ICD-10-PCS | Mod: HCNC,CPTII,, | Performed by: PODIATRIST

## 2023-02-27 PROCEDURE — 3066F NEPHROPATHY DOC TX: CPT | Mod: HCNC,CPTII,, | Performed by: PODIATRIST

## 2023-02-27 PROCEDURE — 3052F PR MOST RECENT HEMOGLOBIN A1C LEVEL 8.0 - < 9.0%: ICD-10-PCS | Mod: HCNC,CPTII,, | Performed by: PODIATRIST

## 2023-02-27 PROCEDURE — 99214 OFFICE O/P EST MOD 30 MIN: CPT | Mod: HCNC,,, | Performed by: PODIATRIST

## 2023-02-27 PROCEDURE — 4010F PR ACE/ARB THEARPY RXD/TAKEN: ICD-10-PCS | Mod: HCNC,CPTII,, | Performed by: PODIATRIST

## 2023-02-27 PROCEDURE — 3080F DIAST BP >= 90 MM HG: CPT | Mod: HCNC,CPTII,, | Performed by: PODIATRIST

## 2023-02-27 PROCEDURE — 3060F POS MICROALBUMINURIA REV: CPT | Mod: HCNC,CPTII,, | Performed by: PODIATRIST

## 2023-02-27 PROCEDURE — 3060F PR POS MICROALBUMINURIA RESULT DOCUMENTED/REVIEW: ICD-10-PCS | Mod: HCNC,CPTII,, | Performed by: PODIATRIST

## 2023-02-27 PROCEDURE — 1160F PR REVIEW ALL MEDS BY PRESCRIBER/CLIN PHARMACIST DOCUMENTED: ICD-10-PCS | Mod: HCNC,CPTII,, | Performed by: PODIATRIST

## 2023-02-27 PROCEDURE — 1159F MED LIST DOCD IN RCRD: CPT | Mod: HCNC,CPTII,, | Performed by: PODIATRIST

## 2023-02-28 NOTE — PROGRESS NOTES
[address] [facility] [address]  [place] [address]  Phone: [phone]   [phone]    Patient's PCP:Cristina Ren MD  Referring Provider: Aaarefalina Collins    Subjective:      Chief Complaint:: Wound Care, Diabetes, Venous Stasis, Venous Ulcer, Wound Check, Leg Swelling, and Non-healing Wound    HPI    Patient presents for follow-up of right anterior superior lower leg wound.     Additionally, patient presents with dry skin and heel fissuring bilateral feet.     Steff Johnson is a 62 y.o. female who presents today with a complaint of right lower leg venous ulcers lasting for approximately 1 week.  See Wound docs for full assessment and evaluation all right lower leg venous ulcers present.     1. See Wound Docs for assessment of wounds and procedure notes  2. Continue taking all medications as prescribed  3. Continue home health dressing changes  4. RTC one week   5. Counseled patient on increasing protein intake, not getting wound wet, keeping dressing clean dry and intact, following a healthy diet, elevating legs when able, removing pressure from wound     Total time spent for E&M 35  Total time for debridement 35 minutes       Athlete's foot counseling: Counseled patient that it is important to keep the feet dry. Use absorbent cotton socks and change them if they become sweaty. Or wear an open-toe shoe or sandal. Wash the feet at least once a day with soap and water. Apply the antifungal cream as prescribed. Recommend spray antiperspirant to the feet. Some antifungal creams are available without a prescription. It may take a week before the rash starts to improve. It can take about 3 to 4 weeks to completely clear. Continue the medicine until the rash is all gone. Use over-the-counter antifungal powders or sprays on your feet after exposure to high-risk environments, such as public showers, gyms, and locker rooms. This can help prevent future infections. Wearing appropriate shoes in these situations can  help.        Fungal infection of skin explained. Treatment options including no treatment, topical medications, oral medications were discussed, as well as success rates and risks of recurrence. We agreed on topical medication   Lotrisone prescribed     Counseling/Education:  I provided patient education verbally regarding:   The aspects of diabetes and how it pertains to the feet. I explained the importance of proper diabetic foot care and how it is essential for the health of their feet.     I discussed the importance of knowing their Hemoglobin A1c and that the level needs to be as close to 6 as possible. I discussed the increase complications of high blood sugar including stroke, blindness, heart attack, kidney failure and loss of limb secondary to neuropathy and PVD.      With neuropathy, beware of any breaks in the skin or redness. These areas are not recognized early due to the numbness.     I discussed Diabetes, lower back issues, metabolic disorders, systemic causes, chemotherapy, vitamin deficiency, heavy metal exposure, as some of the causes. I also explained that as much as 40% of the time we can not find a cause. I discussed different treatments available to control the symptoms but which may not cure the problem.         Counseled patient on the aspects of diabetes and how it pertains to the feet.  I explained the importance of proper diabetic foot care and how it is essential for the health of their feet.        Shoe inspection. Patient instructed on proper foot hygeine. We discussed wearing proper shoe gear, daily foot inspections, never walking without protective shoe gear, never putting sharp instruments to feet, routine podiatric nail visits every 2-3 months.          I counseled the patient on their conditions, their implications and medical management.     >50% of this > 60 minute visit was spent face to face educating/counseling the patient     I spent a total of 60 minutes on the day of the  visit.This includes face to face time and non-face to face time preparing to see the patient (eg, review of tests), obtaining and/or reviewing separately obtained history, documenting clinical information in the electronic or other health record, independently interpreting results and communicating results to the patient/family/caregiver, or care coordinator.        Patient should call the office immediately if any signs of infection, such as fever, chills, sweats, increased redness or pain.     Patient was instructed to call the clinic or go to the emergency department if their symptoms do not improve, worsens, or if new symptoms develop.  Patient was advised that if any increased swelling, pain, or numbness arise to go immediately to the ED. Patient knows to call any time if an emergency arises. Shared decision making occurred and patient verbalized understanding in agreement with this plan.      Much of the documentation for this visit was completed in the Wound Docs system.  Please see the attached documentation for further details about the patient's care. Scanned under the Media tab.         Dorys Mancil, DPM        Vitals:    02/27/23 1112   BP: (!) 160/98   Pulse: 65   Resp: 17   Temp: 98.2 °F (36.8 °C)   PainSc: 0-No pain      Shoe Size:     Past Surgical History:   Procedure Laterality Date    COLONOSCOPY N/A 4/11/2017    Procedure: COLONOSCOPY;  Surgeon: Jared Antonio MD;  Location: [facility];  Service: Endoscopy;  Laterality: N/A;    HYSTERECTOMY      OOPHORECTOMY      SHOULDER SURGERY      R     Past Medical History:   Diagnosis Date    Arthritis     Complete tear of left rotator cuff 11/09/2017    COPD (chronic obstructive pulmonary disease)     COVID-19 infection 07/02/2021    Diabetes mellitus     H/o Cerebrovascular accident (CVA) of left pontine structure 12/12/2019    Hypertension     Personal history of colonic polyps     Stroke     Wears glasses      Family History   Problem Relation Age of  Onset    Diabetes Mother     Asthma Mother     Hypertension Mother     Diabetes Father     Diabetes Brother     Hypertension Brother     Anemia Daughter     Glaucoma Neg Hx     Macular degeneration Neg Hx     Retinal detachment Neg Hx         Social History:   Marital Status: Single  Alcohol History:  reports no history of alcohol use.  Tobacco History:  reports that she has never smoked. She has never used smokeless tobacco.  Drug History:  reports no history of drug use.    Review of patient's allergies indicates:  No Known Allergies    Current Outpatient Medications   Medication Sig Dispense Refill    atorvastatin (LIPITOR) 40 MG tablet Take 1 tablet (40 mg total) by mouth once daily. 90 tablet 1    blood glucose control, low Soln Use as directed (Patient taking differently: 1 each by Misc.(Non-Drug; Combo Route) route. Use as directed) 1 each 1    blood sugar diagnostic (TRUE METRIX GLUCOSE TEST STRIP) Strp Test blood sugar twice daily 200 strip 3    brimonidine 0.2% (ALPHAGAN) 0.2 % Drop Place 1 drop into both eyes every 12 (twelve) hours. 15 mL 11    cloNIDine 0.1 mg/24 hr td ptwk (CATAPRES) 0.1 mg/24 hr Place 1 patch onto the skin every 7 days. 12 patch 1    clopidogreL (PLAVIX) 75 mg tablet Take 1 tablet (75 mg total) by mouth once daily. 90 tablet 1    ergocalciferol (ERGOCALCIFEROL) 50,000 unit Cap Take 1 capsule (50,000 Units total) by mouth every 7 days. 12 capsule 1    hydrALAZINE (APRESOLINE) 100 MG tablet Take 1 tablet (100 mg total) by mouth 3 (three) times daily. 270 tablet 1    HYDROcodone-acetaminophen (NORCO)  mg per tablet       insulin aspart U-100 (NOVOLOG FLEXPEN U-100 INSULIN) 100 unit/mL (3 mL) InPn pen Inject 14 Units subcutaneously into the skin 3 (three) times daily before meals. 45 mL 3    insulin degludec (TRESIBA FLEXTOUCH U-100) 100 unit/mL (3 mL) insulin pen ADMINISTER 40 UNITS UNDER THE SKIN EVERY EVENING 12 pen 1    lancets Misc 1 lancet by Misc.(Non-Drug; Combo Route) route  "2 (two) times a day. To check BG 2 times daily, to use with insurance preferred meter 200 each 3    losartan (COZAAR) 50 MG tablet Take 1 tablet (50 mg total) by mouth once daily. 90 tablet 1    metoprolol succinate (TOPROL-XL) 100 MG 24 hr tablet Take 1 tablet (100 mg total) by mouth once daily. 90 tablet 1    pen needle, diabetic (BD ULTRA-FINE SHORT PEN NEEDLE) 31 gauge x 5/16" Ndle 1 pen by Misc.(Non-Drug; Combo Route) route 4 (four) times daily. 300 each 5    tiotropium (SPIRIVA) 18 mcg inhalation capsule Inhale 1 capsule (18 mcg total) into the lungs once daily. Controller 90 capsule 3     No current facility-administered medications for this visit.       Review of Systems   Constitutional:  Negative for chills, fatigue, fever and unexpected weight change.   HENT:  Negative for hearing loss and trouble swallowing.    Eyes:  Negative for photophobia and visual disturbance.   Respiratory:  Negative for cough, shortness of breath and wheezing.    Cardiovascular:  Positive for leg swelling. Negative for chest pain and palpitations.   Gastrointestinal:  Negative for abdominal pain and nausea.   Genitourinary:  Negative for dysuria and frequency.   Musculoskeletal:  Negative for arthralgias, back pain, gait problem, joint swelling and myalgias.   Skin:  Positive for wound. Negative for rash.   Neurological:  Negative for tremors, seizures, weakness, numbness and headaches.   Hematological:  Does not bruise/bleed easily.       Objective:        Physical Exam:   Foot Exam  Physical Exam  Ortho/SPM Exam     Imaging:            Assessment:       1. Non-pressure chronic ulcer of right lower leg with fat layer exposed    2. Type 2 diabetes mellitus with diabetic nephropathy, with long-term current use of insulin    3. Type 2 diabetes mellitus with both eyes affected by moderate nonproliferative retinopathy and macular edema, with long-term current use of insulin    4. Bilateral lower extremity edema    5. Type 2 diabetes " mellitus with hyperglycemia, unspecified whether long term insulin use    6. Venous stasis ulcer of other part of right lower leg with fat layer exposed, unspecified whether varicose veins present    7. 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 History of ulcer of lower extremity    9. Venous stasis ulcer of other part of right lower leg with necrosis of muscle, unspecified whether varicose veins present    10. At high risk for inadequate nutritional intake      Plan:   Non-pressure chronic ulcer of right lower leg with fat layer exposed    Type 2 diabetes mellitus with diabetic nephropathy, with long-term current use of insulin    Type 2 diabetes mellitus with both eyes affected by moderate nonproliferative retinopathy and macular edema, with long-term current use of insulin    Bilateral lower extremity edema    Type 2 diabetes mellitus with hyperglycemia, unspecified whether long term insulin use    Venous stasis ulcer of other part of right lower leg with fat layer exposed, unspecified whether varicose veins present    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    Procedures          Counseling:     I provided patient education verbally regarding:   Patient diagnosis, treatment options, as well as alternatives, risks, and benefits.     This note was created using Dragon voice recognition software that occasionally misinterpreted phrases or words.

## 2023-03-06 ENCOUNTER — OFFICE VISIT (OUTPATIENT)
Dept: WOUND CARE | Facility: HOSPITAL | Age: 63
End: 2023-03-06
Attending: PODIATRIST
Payer: MEDICARE

## 2023-03-06 VITALS
RESPIRATION RATE: 18 BRPM | TEMPERATURE: 98 F | SYSTOLIC BLOOD PRESSURE: 182 MMHG | HEART RATE: 68 BPM | DIASTOLIC BLOOD PRESSURE: 80 MMHG

## 2023-03-06 DIAGNOSIS — Z79.4 TYPE 2 DIABETES MELLITUS WITH DIABETIC NEPHROPATHY, WITH LONG-TERM CURRENT USE OF INSULIN: ICD-10-CM

## 2023-03-06 DIAGNOSIS — E11.21 TYPE 2 DIABETES MELLITUS WITH DIABETIC NEPHROPATHY, WITH LONG-TERM CURRENT USE OF INSULIN: ICD-10-CM

## 2023-03-06 DIAGNOSIS — E11.65 TYPE 2 DIABETES MELLITUS WITH HYPERGLYCEMIA, UNSPECIFIED WHETHER LONG TERM INSULIN USE: ICD-10-CM

## 2023-03-06 DIAGNOSIS — E11.3313 TYPE 2 DIABETES MELLITUS WITH BOTH EYES AFFECTED BY MODERATE NONPROLIFERATIVE RETINOPATHY AND MACULAR EDEMA, WITH LONG-TERM CURRENT USE OF INSULIN: ICD-10-CM

## 2023-03-06 DIAGNOSIS — L97.813 VENOUS STASIS ULCER OF OTHER PART OF RIGHT LOWER LEG WITH NECROSIS OF MUSCLE, UNSPECIFIED WHETHER VARICOSE VEINS PRESENT: ICD-10-CM

## 2023-03-06 DIAGNOSIS — L97.913 NON-PRESSURE CHRONIC ULCER OF RIGHT LOWER LEG WITH NECROSIS OF MUSCLE: ICD-10-CM

## 2023-03-06 DIAGNOSIS — Z79.4 TYPE 2 DIABETES MELLITUS WITH BOTH EYES AFFECTED BY MODERATE NONPROLIFERATIVE RETINOPATHY AND MACULAR EDEMA, WITH LONG-TERM CURRENT USE OF INSULIN: ICD-10-CM

## 2023-03-06 DIAGNOSIS — L97.912 NON-PRESSURE CHRONIC ULCER OF RIGHT LOWER LEG WITH FAT LAYER EXPOSED: ICD-10-CM

## 2023-03-06 DIAGNOSIS — I83.018 VENOUS STASIS ULCER OF OTHER PART OF RIGHT LOWER LEG WITH NECROSIS OF MUSCLE, UNSPECIFIED WHETHER VARICOSE VEINS PRESENT: ICD-10-CM

## 2023-03-06 DIAGNOSIS — I83.018 VENOUS STASIS ULCER OF OTHER PART OF RIGHT LOWER LEG WITH FAT LAYER EXPOSED, UNSPECIFIED WHETHER VARICOSE VEINS PRESENT: Primary | ICD-10-CM

## 2023-03-06 DIAGNOSIS — Z91.89 AT HIGH RISK FOR INADEQUATE NUTRITIONAL INTAKE: ICD-10-CM

## 2023-03-06 DIAGNOSIS — R60.0 BILATERAL LOWER EXTREMITY EDEMA: ICD-10-CM

## 2023-03-06 DIAGNOSIS — L97.812 VENOUS STASIS ULCER OF OTHER PART OF RIGHT LOWER LEG WITH FAT LAYER EXPOSED, UNSPECIFIED WHETHER VARICOSE VEINS PRESENT: Primary | ICD-10-CM

## 2023-03-06 DIAGNOSIS — Z87.2 HISTORY OF ULCER OF LOWER EXTREMITY: ICD-10-CM

## 2023-03-06 PROCEDURE — 1160F RVW MEDS BY RX/DR IN RCRD: CPT | Mod: HCNC,CPTII,, | Performed by: PODIATRIST

## 2023-03-06 PROCEDURE — 4010F PR ACE/ARB THEARPY RXD/TAKEN: ICD-10-PCS | Mod: HCNC,CPTII,, | Performed by: PODIATRIST

## 2023-03-06 PROCEDURE — 1160F PR REVIEW ALL MEDS BY PRESCRIBER/CLIN PHARMACIST DOCUMENTED: ICD-10-PCS | Mod: HCNC,CPTII,, | Performed by: PODIATRIST

## 2023-03-06 PROCEDURE — 3066F NEPHROPATHY DOC TX: CPT | Mod: HCNC,CPTII,, | Performed by: PODIATRIST

## 2023-03-06 PROCEDURE — 3077F SYST BP >= 140 MM HG: CPT | Mod: HCNC,CPTII,, | Performed by: PODIATRIST

## 2023-03-06 PROCEDURE — 99499 UNLISTED E&M SERVICE: CPT | Mod: HCNC,,, | Performed by: PODIATRIST

## 2023-03-06 PROCEDURE — 1159F PR MEDICATION LIST DOCUMENTED IN MEDICAL RECORD: ICD-10-PCS | Mod: HCNC,CPTII,, | Performed by: PODIATRIST

## 2023-03-06 PROCEDURE — 3066F PR DOCUMENTATION OF TREATMENT FOR NEPHROPATHY: ICD-10-PCS | Mod: HCNC,CPTII,, | Performed by: PODIATRIST

## 2023-03-06 PROCEDURE — 3060F PR POS MICROALBUMINURIA RESULT DOCUMENTED/REVIEW: ICD-10-PCS | Mod: HCNC,CPTII,, | Performed by: PODIATRIST

## 2023-03-06 PROCEDURE — 99214 PR OFFICE/OUTPT VISIT, EST, LEVL IV, 30-39 MIN: ICD-10-PCS | Mod: HCNC,,, | Performed by: PODIATRIST

## 2023-03-06 PROCEDURE — 3052F HG A1C>EQUAL 8.0%<EQUAL 9.0%: CPT | Mod: HCNC,CPTII,, | Performed by: PODIATRIST

## 2023-03-06 PROCEDURE — 3077F PR MOST RECENT SYSTOLIC BLOOD PRESSURE >= 140 MM HG: ICD-10-PCS | Mod: HCNC,CPTII,, | Performed by: PODIATRIST

## 2023-03-06 PROCEDURE — 4010F ACE/ARB THERAPY RXD/TAKEN: CPT | Mod: HCNC,CPTII,, | Performed by: PODIATRIST

## 2023-03-06 PROCEDURE — 3052F PR MOST RECENT HEMOGLOBIN A1C LEVEL 8.0 - < 9.0%: ICD-10-PCS | Mod: HCNC,CPTII,, | Performed by: PODIATRIST

## 2023-03-06 PROCEDURE — 99499 RISK ADDL DX/OHS AUDIT: ICD-10-PCS | Mod: HCNC,,, | Performed by: PODIATRIST

## 2023-03-06 PROCEDURE — 3079F DIAST BP 80-89 MM HG: CPT | Mod: HCNC,CPTII,, | Performed by: PODIATRIST

## 2023-03-06 PROCEDURE — 3079F PR MOST RECENT DIASTOLIC BLOOD PRESSURE 80-89 MM HG: ICD-10-PCS | Mod: HCNC,CPTII,, | Performed by: PODIATRIST

## 2023-03-06 PROCEDURE — 1159F MED LIST DOCD IN RCRD: CPT | Mod: HCNC,CPTII,, | Performed by: PODIATRIST

## 2023-03-06 PROCEDURE — 3060F POS MICROALBUMINURIA REV: CPT | Mod: HCNC,CPTII,, | Performed by: PODIATRIST

## 2023-03-06 PROCEDURE — 99213 OFFICE O/P EST LOW 20 MIN: CPT | Mod: HCNC | Performed by: PODIATRIST

## 2023-03-06 PROCEDURE — 99214 OFFICE O/P EST MOD 30 MIN: CPT | Mod: HCNC,,, | Performed by: PODIATRIST

## 2023-03-07 ENCOUNTER — PATIENT MESSAGE (OUTPATIENT)
Dept: ADMINISTRATIVE | Facility: HOSPITAL | Age: 63
End: 2023-03-07
Payer: MEDICARE

## 2023-03-07 NOTE — PROGRESS NOTES
[address] [facility] [address]  [place] [address]  Phone: [phone]   [phone]    Patient's PCP:Cristina Ren MD  Referring Provider: No ref. provider found    Subjective:      Chief Complaint:: Wound Care, Non-healing Wound, Venous Stasis, Venous Ulcer, Leg Swelling, Diabetes, and Wound Check    HPI     Patient presents for follow-up of right anterior superior lower leg wound.     Additionally, patient presents with dry skin and heel fissuring bilateral feet.     Steff Johnson is a 62 y.o. female who presents today with a complaint of right lower leg venous ulcers lasting for approximately 1 week.  See Wound docs for full assessment and evaluation all right lower leg venous ulcers present.     1.Patient is healed See Wound Docs for assessment of wounds and procedure notes  2. Continue taking all medications as prescribed  3. Continue home compression   4. RTC prn  5. Counseled patient on increasing protein intake, following a healthy diet, elevating legs when able, removing pressure from wound      Athlete's foot counseling: Counseled patient that it is important to keep the feet dry. Use absorbent cotton socks and change them if they become sweaty. Or wear an open-toe shoe or sandal. Wash the feet at least once a day with soap and water. Apply the antifungal cream as prescribed. Recommend spray antiperspirant to the feet. Some antifungal creams are available without a prescription. It may take a week before the rash starts to improve. It can take about 3 to 4 weeks to completely clear. Continue the medicine until the rash is all gone. Use over-the-counter antifungal powders or sprays on your feet after exposure to high-risk environments, such as public showers, gyms, and locker rooms. This can help prevent future infections. Wearing appropriate shoes in these situations can help.        Fungal infection of skin explained. Treatment options including no treatment, topical medications, oral  medications were discussed, as well as success rates and risks of recurrence. We agreed on topical medication   Lotrisone prescribed     Counseling/Education:  I provided patient education verbally regarding:   The aspects of diabetes and how it pertains to the feet. I explained the importance of proper diabetic foot care and how it is essential for the health of their feet.     I discussed the importance of knowing their Hemoglobin A1c and that the level needs to be as close to 6 as possible. I discussed the increase complications of high blood sugar including stroke, blindness, heart attack, kidney failure and loss of limb secondary to neuropathy and PVD.      With neuropathy, beware of any breaks in the skin or redness. These areas are not recognized early due to the numbness.     I discussed Diabetes, lower back issues, metabolic disorders, systemic causes, chemotherapy, vitamin deficiency, heavy metal exposure, as some of the causes. I also explained that as much as 40% of the time we can not find a cause. I discussed different treatments available to control the symptoms but which may not cure the problem.         Counseled patient on the aspects of diabetes and how it pertains to the feet.  I explained the importance of proper diabetic foot care and how it is essential for the health of their feet.        Shoe inspection. Patient instructed on proper foot hygeine. We discussed wearing proper shoe gear, daily foot inspections, never walking without protective shoe gear, never putting sharp instruments to feet, routine podiatric nail visits every 2-3 months.          I counseled the patient on their conditions, their implications and medical management.     >50% of this > 60 minute visit was spent face to face educating/counseling the patient     I spent a total of 60 minutes on the day of the visit.This includes face to face time and non-face to face time preparing to see the patient (eg, review of tests),  obtaining and/or reviewing separately obtained history, documenting clinical information in the electronic or other health record, independently interpreting results and communicating results to the patient/family/caregiver, or care coordinator.        Patient should call the office immediately if any signs of infection, such as fever, chills, sweats, increased redness or pain.     Patient was instructed to call the clinic or go to the emergency department if their symptoms do not improve, worsens, or if new symptoms develop.  Patient was advised that if any increased swelling, pain, or numbness arise to go immediately to the ED. Patient knows to call any time if an emergency arises. Shared decision making occurred and patient verbalized understanding in agreement with this plan.      Much of the documentation for this visit was completed in the Wound Docs system.  Please see the attached documentation for further details about the patient's care. Scanned under the Media tab.         Dorys Crespo DPM        Vitals:    03/06/23 1132   BP: (!) 182/80   Pulse: 68   Resp: 18   Temp: 98 °F (36.7 °C)   TempSrc: Skin   PainSc: 0-No pain      Shoe Size:     Past Surgical History:   Procedure Laterality Date    COLONOSCOPY N/A 4/11/2017    Procedure: COLONOSCOPY;  Surgeon: Jared Antonio MD;  Location: [facility];  Service: Endoscopy;  Laterality: N/A;    HYSTERECTOMY      OOPHORECTOMY      SHOULDER SURGERY      R     Past Medical History:   Diagnosis Date    Arthritis     Complete tear of left rotator cuff 11/09/2017    COPD (chronic obstructive pulmonary disease)     COVID-19 infection 07/02/2021    Diabetes mellitus     H/o Cerebrovascular accident (CVA) of left pontine structure 12/12/2019    Hypertension     Personal history of colonic polyps     Stroke     Wears glasses      Family History   Problem Relation Age of Onset    Diabetes Mother     Asthma Mother     Hypertension Mother     Diabetes Father     Diabetes  Brother     Hypertension Brother     Anemia Daughter     Glaucoma Neg Hx     Macular degeneration Neg Hx     Retinal detachment Neg Hx         Social History:   Marital Status: Single  Alcohol History:  reports no history of alcohol use.  Tobacco History:  reports that she has never smoked. She has never used smokeless tobacco.  Drug History:  reports no history of drug use.    Review of patient's allergies indicates:  No Known Allergies    Current Outpatient Medications   Medication Sig Dispense Refill    atorvastatin (LIPITOR) 40 MG tablet Take 1 tablet (40 mg total) by mouth once daily. 90 tablet 1    blood glucose control, low Soln Use as directed (Patient taking differently: 1 each by Misc.(Non-Drug; Combo Route) route. Use as directed) 1 each 1    blood sugar diagnostic (TRUE METRIX GLUCOSE TEST STRIP) Strp Test blood sugar twice daily 200 strip 3    brimonidine 0.2% (ALPHAGAN) 0.2 % Drop Place 1 drop into both eyes every 12 (twelve) hours. 15 mL 11    cloNIDine 0.1 mg/24 hr td ptwk (CATAPRES) 0.1 mg/24 hr Place 1 patch onto the skin every 7 days. 12 patch 1    clopidogreL (PLAVIX) 75 mg tablet Take 1 tablet (75 mg total) by mouth once daily. 90 tablet 1    ergocalciferol (ERGOCALCIFEROL) 50,000 unit Cap Take 1 capsule (50,000 Units total) by mouth every 7 days. 12 capsule 1    hydrALAZINE (APRESOLINE) 100 MG tablet Take 1 tablet (100 mg total) by mouth 3 (three) times daily. 270 tablet 1    HYDROcodone-acetaminophen (NORCO)  mg per tablet       insulin aspart U-100 (NOVOLOG FLEXPEN U-100 INSULIN) 100 unit/mL (3 mL) InPn pen Inject 14 Units subcutaneously into the skin 3 (three) times daily before meals. 45 mL 3    insulin degludec (TRESIBA FLEXTOUCH U-100) 100 unit/mL (3 mL) insulin pen ADMINISTER 40 UNITS UNDER THE SKIN EVERY EVENING 12 pen 1    lancets Misc 1 lancet by Misc.(Non-Drug; Combo Route) route 2 (two) times a day. To check BG 2 times daily, to use with insurance preferred meter 200 each 3     "losartan (COZAAR) 50 MG tablet Take 1 tablet (50 mg total) by mouth once daily. 90 tablet 1    metoprolol succinate (TOPROL-XL) 100 MG 24 hr tablet Take 1 tablet (100 mg total) by mouth once daily. 90 tablet 1    pen needle, diabetic (BD ULTRA-FINE SHORT PEN NEEDLE) 31 gauge x 5/16" Ndle 1 pen by Misc.(Non-Drug; Combo Route) route 4 (four) times daily. 300 each 5    tiotropium (SPIRIVA) 18 mcg inhalation capsule Inhale 1 capsule (18 mcg total) into the lungs once daily. Controller 90 capsule 3     No current facility-administered medications for this visit.       Review of Systems   Constitutional:  Negative for chills, fatigue, fever and unexpected weight change.   HENT:  Negative for hearing loss and trouble swallowing.    Eyes:  Negative for photophobia and visual disturbance.   Respiratory:  Negative for cough, shortness of breath and wheezing.    Cardiovascular:  Positive for leg swelling. Negative for chest pain and palpitations.   Gastrointestinal:  Negative for abdominal pain and nausea.   Genitourinary:  Negative for dysuria and frequency.   Musculoskeletal:  Negative for arthralgias, back pain, gait problem, joint swelling and myalgias.   Skin:  Negative for rash and wound.   Neurological:  Negative for tremors, seizures, weakness, numbness and headaches.   Hematological:  Does not bruise/bleed easily.       Objective:        Physical Exam:   Foot Exam  Physical Exam  Ortho/SPM Exam     Imaging:            Assessment:       1. Venous stasis ulcer of other part of right lower leg with fat layer exposed, unspecified whether varicose veins present    2. 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 Non-pressure chronic ulcer of right lower leg with fat layer exposed    4. Type 2 diabetes mellitus with both eyes affected by moderate nonproliferative retinopathy and macular edema, with long-term current use of insulin    5. History of ulcer of lower extremity    6. Bilateral lower " extremity edema    7. Type 2 diabetes mellitus with diabetic nephropathy, with long-term current use of insulin    8. Type 2 diabetes mellitus with hyperglycemia, unspecified whether long term insulin use    9. At high risk for inadequate nutritional intake    10. 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    Non-pressure chronic ulcer of right lower leg with fat layer exposed    Type 2 diabetes mellitus with both eyes affected by moderate nonproliferative retinopathy and macular edema, with long-term current use of insulin    History of ulcer of lower extremity    Bilateral lower extremity edema    Type 2 diabetes mellitus with diabetic nephropathy, with long-term current use of insulin    Type 2 diabetes mellitus with hyperglycemia, unspecified whether long term insulin use    At high risk for inadequate nutritional intake    Venous stasis ulcer of other part of right lower leg with necrosis of muscle, unspecified whether varicose veins present      Follow up if symptoms worsen or fail to improve.    Procedures          Counseling:     I provided patient education verbally regarding:   Patient diagnosis, treatment options, as well as alternatives, risks, and benefits.     This note was created using Dragon voice recognition software that occasionally misinterpreted phrases or words.

## 2023-03-08 ENCOUNTER — OFFICE VISIT (OUTPATIENT)
Dept: OPHTHALMOLOGY | Facility: CLINIC | Age: 63
End: 2023-03-08
Payer: MEDICARE

## 2023-03-08 DIAGNOSIS — H40.053 OCULAR HYPERTENSION, BILATERAL: ICD-10-CM

## 2023-03-08 DIAGNOSIS — Z79.4 TYPE 2 DIABETES MELLITUS WITH BOTH EYES AFFECTED BY MODERATE NONPROLIFERATIVE RETINOPATHY AND MACULAR EDEMA, WITH LONG-TERM CURRENT USE OF INSULIN: Primary | ICD-10-CM

## 2023-03-08 DIAGNOSIS — H25.813 COMBINED FORMS OF AGE-RELATED CATARACT, BILATERAL: ICD-10-CM

## 2023-03-08 DIAGNOSIS — E11.3313 TYPE 2 DIABETES MELLITUS WITH BOTH EYES AFFECTED BY MODERATE NONPROLIFERATIVE RETINOPATHY AND MACULAR EDEMA, WITH LONG-TERM CURRENT USE OF INSULIN: Primary | ICD-10-CM

## 2023-03-08 PROCEDURE — 3060F POS MICROALBUMINURIA REV: CPT | Mod: HCNC,CPTII,S$GLB, | Performed by: OPHTHALMOLOGY

## 2023-03-08 PROCEDURE — 1101F PR PT FALLS ASSESS DOC 0-1 FALLS W/OUT INJ PAST YR: ICD-10-PCS | Mod: HCNC,CPTII,S$GLB, | Performed by: OPHTHALMOLOGY

## 2023-03-08 PROCEDURE — 3060F PR POS MICROALBUMINURIA RESULT DOCUMENTED/REVIEW: ICD-10-PCS | Mod: HCNC,CPTII,S$GLB, | Performed by: OPHTHALMOLOGY

## 2023-03-08 PROCEDURE — 1160F RVW MEDS BY RX/DR IN RCRD: CPT | Mod: HCNC,CPTII,S$GLB, | Performed by: OPHTHALMOLOGY

## 2023-03-08 PROCEDURE — 1101F PT FALLS ASSESS-DOCD LE1/YR: CPT | Mod: HCNC,CPTII,S$GLB, | Performed by: OPHTHALMOLOGY

## 2023-03-08 PROCEDURE — 3052F HG A1C>EQUAL 8.0%<EQUAL 9.0%: CPT | Mod: HCNC,CPTII,S$GLB, | Performed by: OPHTHALMOLOGY

## 2023-03-08 PROCEDURE — 99214 PR OFFICE/OUTPT VISIT, EST, LEVL IV, 30-39 MIN: ICD-10-PCS | Mod: 25,HCNC,S$GLB, | Performed by: OPHTHALMOLOGY

## 2023-03-08 PROCEDURE — 92134 CPTRZ OPH DX IMG PST SGM RTA: CPT | Mod: HCNC,S$GLB,, | Performed by: OPHTHALMOLOGY

## 2023-03-08 PROCEDURE — 99999 PR PBB SHADOW E&M-EST. PATIENT-LVL III: CPT | Mod: PBBFAC,HCNC,, | Performed by: OPHTHALMOLOGY

## 2023-03-08 PROCEDURE — 1160F PR REVIEW ALL MEDS BY PRESCRIBER/CLIN PHARMACIST DOCUMENTED: ICD-10-PCS | Mod: HCNC,CPTII,S$GLB, | Performed by: OPHTHALMOLOGY

## 2023-03-08 PROCEDURE — 99999 PR PBB SHADOW E&M-EST. PATIENT-LVL III: ICD-10-PCS | Mod: PBBFAC,HCNC,, | Performed by: OPHTHALMOLOGY

## 2023-03-08 PROCEDURE — 1159F PR MEDICATION LIST DOCUMENTED IN MEDICAL RECORD: ICD-10-PCS | Mod: HCNC,CPTII,S$GLB, | Performed by: OPHTHALMOLOGY

## 2023-03-08 PROCEDURE — 4010F ACE/ARB THERAPY RXD/TAKEN: CPT | Mod: HCNC,CPTII,S$GLB, | Performed by: OPHTHALMOLOGY

## 2023-03-08 PROCEDURE — 1159F MED LIST DOCD IN RCRD: CPT | Mod: HCNC,CPTII,S$GLB, | Performed by: OPHTHALMOLOGY

## 2023-03-08 PROCEDURE — 3288F PR FALLS RISK ASSESSMENT DOCUMENTED: ICD-10-PCS | Mod: HCNC,CPTII,S$GLB, | Performed by: OPHTHALMOLOGY

## 2023-03-08 PROCEDURE — 3066F PR DOCUMENTATION OF TREATMENT FOR NEPHROPATHY: ICD-10-PCS | Mod: HCNC,CPTII,S$GLB, | Performed by: OPHTHALMOLOGY

## 2023-03-08 PROCEDURE — 67028 PR INJECT INTRAVITREAL PHARMCOLOGIC: ICD-10-PCS | Mod: 50,HCNC,S$GLB, | Performed by: OPHTHALMOLOGY

## 2023-03-08 PROCEDURE — 4010F PR ACE/ARB THEARPY RXD/TAKEN: ICD-10-PCS | Mod: HCNC,CPTII,S$GLB, | Performed by: OPHTHALMOLOGY

## 2023-03-08 PROCEDURE — 67028 INJECTION EYE DRUG: CPT | Mod: 50,HCNC,S$GLB, | Performed by: OPHTHALMOLOGY

## 2023-03-08 PROCEDURE — 92134 OCT, RETINA - OU - BOTH EYES: ICD-10-PCS | Mod: HCNC,S$GLB,, | Performed by: OPHTHALMOLOGY

## 2023-03-08 PROCEDURE — 3066F NEPHROPATHY DOC TX: CPT | Mod: HCNC,CPTII,S$GLB, | Performed by: OPHTHALMOLOGY

## 2023-03-08 PROCEDURE — 3052F PR MOST RECENT HEMOGLOBIN A1C LEVEL 8.0 - < 9.0%: ICD-10-PCS | Mod: HCNC,CPTII,S$GLB, | Performed by: OPHTHALMOLOGY

## 2023-03-08 PROCEDURE — 3288F FALL RISK ASSESSMENT DOCD: CPT | Mod: HCNC,CPTII,S$GLB, | Performed by: OPHTHALMOLOGY

## 2023-03-08 PROCEDURE — 99214 OFFICE O/P EST MOD 30 MIN: CPT | Mod: 25,HCNC,S$GLB, | Performed by: OPHTHALMOLOGY

## 2023-03-08 NOTE — PROGRESS NOTES
HPI    DLS: 1/18/2023- Possible IVO OD     Pt states she has not been having any trouble. Denies pain/ FOL/ floaters.     Brim OD BID     Last edited by Shannon Blank on 3/8/2023  2:20 PM.             A/P    ICD-10-CM ICD-9-CM   1. Type 2 diabetes mellitus with both eyes affected by moderate nonproliferative retinopathy and macular edema, with long-term current use of insulin  E11.3313 250.50    Z79.4 362.05     362.07     V58.67   2. Ocular hypertension, bilateral  H40.053 365.04   3. Combined forms of age-related cataract, bilateral  H25.813 366.19       1. Type 2 diabetes mellitus with both eyes affected by moderate nonproliferative retinopathy and macular edema, with long-term current use of insulin  PCP Cristina Ren MD  02/22/2023  8.9  A1C     Pt had recent strokes recently, in wheelchair    OD: no injections or laser  VA 20/40 (was 20/30) , stable foveal IRF, no NV  Plan: recommend ODX for significant DME  Based on todays exam, diagnostic studies, and review of records, the determination was made for treatment today.  Schedule Ozurdex Injection Right Eye today Patient chooses to proceed with injection R/B/A discussed including but not limited to: glaucoma, infection, retinal detachment and cataract    Patient identified.  Timeout performed.    Risks, benefits, and alternatives to treatment were discussed in detail with the patient, including bleeding/infection (endophthalmitis)/glaucoma/cataract, etc.  The patient voiced understanding and wished to proceed with the procedure.  See separate consent form.    Ozurdex Injection Procedure Note:  Diagnosis: DME Right Eye  Right  Topical Proparacaine drop placed then topical 5% Betadine.  Subconjunctival injection of 2% lidocaine placed without complication.  Sterile gloves used, and sterile lid speculum placed.  5% Betadine at injection site again prior to injection.  Ozurdex 0.7 mg implant injected at  inferotemporally 3.5-4mm posterior to the  limbus.  Complications: None  Va at least CF at 5 feet post injection.  Retina, ONH, IOP normal after injection.    Followup as below.  Patient should return immediately PRN.  Retinal Detachment and Endophthalmitis precautions given.       OS: no injections or laser  VA 20/40 (was 20/30) , worse IRF   Plan: recommend ODX OS for worse IRF  Based on todays exam, diagnostic studies, and review of records, the determination was made for treatment today.  Schedule Ozurdex Injection Left Eye today Patient chooses to proceed with injection R/B/A discussed including but not limited to: glaucoma, infection, retinal detachment and cataract    Patient identified.  Timeout performed.    Risks, benefits, and alternatives to treatment were discussed in detail with the patient, including bleeding/infection (endophthalmitis)/glaucoma/cataract, etc.  The patient voiced understanding and wished to proceed with the procedure.  See separate consent form.    Ozurdex Injection Procedure Note:  Diagnosis: DME Left Eye    Topical Proparacaine drop placed then topical 5% Betadine.  Subconjunctival injection of 2% lidocaine placed without complication.  Sterile gloves used, and sterile lid speculum placed.  5% Betadine at injection site again prior to injection.  Ozurdex 0.7 mg implant injected at  inferotemporally 3.5-4mm posterior to the limbus.  Complications: None  Va at least CF at 5 feet post injection.  Retina, ONH, IOP normal after injection.    Followup as below.  Patient should return immediately PRN.  Retinal Detachment and Endophthalmitis precautions given.       Recommend good blood pressure control, tight blood glucose control, and good cholesterol control     2. Ocular hypertension, bilateral  IOP 22/24 on Brim   F/b Dr. Ambrose  Stressed compliance    3. Combined forms of age-related cataract, bilateral  Mod NS, borderline VS  Plan: Observation, update Mrx     RTC Fi[place]lla 6 weeks DFE/OCTm OU   RTC Arnol as scheduled     I saw  and examined the patient and reviewed in detail the findings documented. The final examination findings, image interpretations, and plan as documented in the record represent my personal judgment and conclusions.    Fito Barros MD  Vitreoretinal Surgery   [facility]

## 2023-03-09 NOTE — PATIENT INSTRUCTIONS
.Please rinse the eye/eyes with artificial tears as needed during the first 1-2 days for any irritation, burning, itching, or grittiness you may be experiencing. It is normal to see a small dashed line or sausage-like figure in your upper vision area which you may see for a few days or up to a week. Please call our office immediately before your next scheduled appointment if you are experiencing any pain, pressure, or vision decrease. The phone number to call can be found on your appointment slip.

## 2023-03-24 ENCOUNTER — PATIENT OUTREACH (OUTPATIENT)
Dept: ADMINISTRATIVE | Facility: HOSPITAL | Age: 63
End: 2023-03-24
Payer: MEDICARE

## 2023-04-11 ENCOUNTER — OFFICE VISIT (OUTPATIENT)
Dept: WOUND CARE | Facility: HOSPITAL | Age: 63
End: 2023-04-11
Attending: PODIATRIST
Payer: MEDICARE

## 2023-04-11 VITALS
RESPIRATION RATE: 18 BRPM | TEMPERATURE: 98 F | SYSTOLIC BLOOD PRESSURE: 185 MMHG | DIASTOLIC BLOOD PRESSURE: 97 MMHG | HEART RATE: 66 BPM

## 2023-04-11 DIAGNOSIS — Z87.2 HISTORY OF ULCER OF LOWER EXTREMITY: ICD-10-CM

## 2023-04-11 DIAGNOSIS — E11.3313 TYPE 2 DIABETES MELLITUS WITH BOTH EYES AFFECTED BY MODERATE NONPROLIFERATIVE RETINOPATHY AND MACULAR EDEMA, WITH LONG-TERM CURRENT USE OF INSULIN: ICD-10-CM

## 2023-04-11 DIAGNOSIS — Z79.4 TYPE 2 DIABETES MELLITUS WITH BOTH EYES AFFECTED BY MODERATE NONPROLIFERATIVE RETINOPATHY AND MACULAR EDEMA, WITH LONG-TERM CURRENT USE OF INSULIN: ICD-10-CM

## 2023-04-11 DIAGNOSIS — L97.913 NON-PRESSURE CHRONIC ULCER OF RIGHT LOWER LEG WITH NECROSIS OF MUSCLE: ICD-10-CM

## 2023-04-11 DIAGNOSIS — R60.0 BILATERAL LOWER EXTREMITY EDEMA: ICD-10-CM

## 2023-04-11 DIAGNOSIS — Z79.4 TYPE 2 DIABETES MELLITUS WITH DIABETIC NEPHROPATHY, WITH LONG-TERM CURRENT USE OF INSULIN: ICD-10-CM

## 2023-04-11 DIAGNOSIS — Z91.89 AT HIGH RISK FOR INADEQUATE NUTRITIONAL INTAKE: ICD-10-CM

## 2023-04-11 DIAGNOSIS — I83.018 VENOUS STASIS ULCER OF OTHER PART OF RIGHT LOWER LEG WITH FAT LAYER EXPOSED, UNSPECIFIED WHETHER VARICOSE VEINS PRESENT: Primary | ICD-10-CM

## 2023-04-11 DIAGNOSIS — L97.812 VENOUS STASIS ULCER OF OTHER PART OF RIGHT LOWER LEG WITH FAT LAYER EXPOSED, UNSPECIFIED WHETHER VARICOSE VEINS PRESENT: Primary | ICD-10-CM

## 2023-04-11 DIAGNOSIS — L97.912 NON-PRESSURE CHRONIC ULCER OF RIGHT LOWER LEG WITH FAT LAYER EXPOSED: ICD-10-CM

## 2023-04-11 DIAGNOSIS — E11.65 TYPE 2 DIABETES MELLITUS WITH HYPERGLYCEMIA, UNSPECIFIED WHETHER LONG TERM INSULIN USE: ICD-10-CM

## 2023-04-11 DIAGNOSIS — E11.40 TYPE 2 DIABETES MELLITUS WITH DIABETIC NEUROPATHY, UNSPECIFIED WHETHER LONG TERM INSULIN USE: ICD-10-CM

## 2023-04-11 DIAGNOSIS — E11.21 TYPE 2 DIABETES MELLITUS WITH DIABETIC NEPHROPATHY, WITH LONG-TERM CURRENT USE OF INSULIN: ICD-10-CM

## 2023-04-11 PROCEDURE — 3066F PR DOCUMENTATION OF TREATMENT FOR NEPHROPATHY: ICD-10-PCS | Mod: CPTII,,, | Performed by: PODIATRIST

## 2023-04-11 PROCEDURE — 3066F NEPHROPATHY DOC TX: CPT | Mod: CPTII,,, | Performed by: PODIATRIST

## 2023-04-11 PROCEDURE — 99214 OFFICE O/P EST MOD 30 MIN: CPT | Mod: 25 | Performed by: PODIATRIST

## 2023-04-11 PROCEDURE — 3077F SYST BP >= 140 MM HG: CPT | Mod: CPTII,,, | Performed by: PODIATRIST

## 2023-04-11 PROCEDURE — 1160F RVW MEDS BY RX/DR IN RCRD: CPT | Mod: CPTII,,, | Performed by: PODIATRIST

## 2023-04-11 PROCEDURE — 4010F ACE/ARB THERAPY RXD/TAKEN: CPT | Mod: CPTII,,, | Performed by: PODIATRIST

## 2023-04-11 PROCEDURE — 3060F PR POS MICROALBUMINURIA RESULT DOCUMENTED/REVIEW: ICD-10-PCS | Mod: CPTII,,, | Performed by: PODIATRIST

## 2023-04-11 PROCEDURE — 99499 RISK ADDL DX/OHS AUDIT: ICD-10-PCS | Mod: HCNC,,, | Performed by: PODIATRIST

## 2023-04-11 PROCEDURE — 4010F PR ACE/ARB THEARPY RXD/TAKEN: ICD-10-PCS | Mod: CPTII,,, | Performed by: PODIATRIST

## 2023-04-11 PROCEDURE — 3060F POS MICROALBUMINURIA REV: CPT | Mod: CPTII,,, | Performed by: PODIATRIST

## 2023-04-11 PROCEDURE — 99499 UNLISTED E&M SERVICE: CPT | Mod: HCNC,,, | Performed by: PODIATRIST

## 2023-04-11 PROCEDURE — 3077F PR MOST RECENT SYSTOLIC BLOOD PRESSURE >= 140 MM HG: ICD-10-PCS | Mod: CPTII,,, | Performed by: PODIATRIST

## 2023-04-11 PROCEDURE — 3052F PR MOST RECENT HEMOGLOBIN A1C LEVEL 8.0 - < 9.0%: ICD-10-PCS | Mod: CPTII,,, | Performed by: PODIATRIST

## 2023-04-11 PROCEDURE — 99214 PR OFFICE/OUTPT VISIT, EST, LEVL IV, 30-39 MIN: ICD-10-PCS | Mod: ,,, | Performed by: PODIATRIST

## 2023-04-11 PROCEDURE — 1160F PR REVIEW ALL MEDS BY PRESCRIBER/CLIN PHARMACIST DOCUMENTED: ICD-10-PCS | Mod: CPTII,,, | Performed by: PODIATRIST

## 2023-04-11 PROCEDURE — 1159F PR MEDICATION LIST DOCUMENTED IN MEDICAL RECORD: ICD-10-PCS | Mod: CPTII,,, | Performed by: PODIATRIST

## 2023-04-11 PROCEDURE — 3080F DIAST BP >= 90 MM HG: CPT | Mod: CPTII,,, | Performed by: PODIATRIST

## 2023-04-11 PROCEDURE — 1159F MED LIST DOCD IN RCRD: CPT | Mod: CPTII,,, | Performed by: PODIATRIST

## 2023-04-11 PROCEDURE — 3052F HG A1C>EQUAL 8.0%<EQUAL 9.0%: CPT | Mod: CPTII,,, | Performed by: PODIATRIST

## 2023-04-11 PROCEDURE — 29580 STRAPPING UNNA BOOT: CPT | Mod: RT | Performed by: PODIATRIST

## 2023-04-11 PROCEDURE — 3080F PR MOST RECENT DIASTOLIC BLOOD PRESSURE >= 90 MM HG: ICD-10-PCS | Mod: CPTII,,, | Performed by: PODIATRIST

## 2023-04-11 PROCEDURE — 99214 OFFICE O/P EST MOD 30 MIN: CPT | Mod: ,,, | Performed by: PODIATRIST

## 2023-04-11 NOTE — PROGRESS NOTES
[address] [facility] [address]  [place] [address]  Phone: [phone]   [phone]    Patient's PCP:Cristina Ren MD  Referring Provider: No ref. provider found    Subjective:      Chief Complaint:: Venous Stasis, Venous Ulcer, Wound Care, Wound Consult, Wound Check, Leg Swelling, and Diabetes    HPI    Steff Johnson is a 63 y.o. female who presents today with a complaint of a right lower leg venous ulcer   See wound docs documentation for full assessment and evaluation of wound.       1. See Wound Docs for assessment of wounds and procedure notes  2. Continue taking all medications as prescribed  3. Continue home health dressing changes  4. RTC one week   5. Counseled patient on increasing protein intake, not getting wound wet, keeping dressing clean dry and intact, following a healthy diet, elevating legs when able, removing pressure from wound    Counseling/Education:  I provided patient education verbally regarding:   The aspects of diabetes and how it pertains to the feet. I explained the importance of proper diabetic foot care and how it is essential for the health of their feet.    I discussed the importance of knowing their Hemoglobin A1c and that the level needs to be as close to 6 as possible. I discussed the increase complications of high blood sugar including stroke, blindness, heart attack, kidney failure and loss of limb secondary to neuropathy and PVD.     With neuropathy, beware of any breaks in the skin or redness. These areas are not recognized early due to the numbness.    I discussed Diabetes, lower back issues, metabolic disorders, systemic causes, chemotherapy, vitamin deficiency, heavy metal exposure, as some of the causes. I also explained that as much as 40% of the time we can not find a cause. I discussed different treatments available to control the symptoms but which may not cure the problem.       Counseled patient on the aspects of diabetes and how it pertains to the feet.  I  explained the importance of proper diabetic foot care and how it is essential for the health of their feet.      Shoe inspection. Patient instructed on proper foot hygeine. We discussed wearing proper shoe gear, daily foot inspections, never walking without protective shoe gear, never putting sharp instruments to feet, routine podiatric nail visits every 2-3 months.      Patient should call the office immediately if any signs of infection, such as fever, chills, sweats, increased redness or pain.    Patient was instructed to call the clinic or go to the emergency department if their symptoms do not improve, worsens, or if new symptoms develop.  Patient was advised that if any increased swelling, pain, or numbness arise to go immediately to the ED. Patient knows to call any time if an emergency arises. Shared decision making occurred and patient verbalized understanding in agreement with this plan.     I counseled the patient on their conditions, their implications and medical management.     >50% of this > 60 minute visit was spent face to face educating/counseling the patient    I spent a total of 60 minutes on the day of the visit.This includes face to face time and non-face to face time preparing to see the patient (eg, review of tests), obtaining and/or reviewing separately obtained history, documenting clinical information in the electronic or other health record, independently interpreting results and communicating results to the patient/family/caregiver, or care coordinator.       Much of the documentation for this visit was completed in the Wound Docs system.  Please see the attached documentation for further details about the patient's care. Scanned under the Media tab.        Dorys Crespo DPM       Vitals:    04/11/23 0955   BP: (!) 185/97   Pulse: 66   Resp: 18   Temp: 98.2 °F (36.8 °C)   TempSrc: Temporal   PainSc: 0-No pain   PainLoc: Leg      Shoe Size:     Past Surgical History:   Procedure  Laterality Date    COLONOSCOPY N/A 4/11/2017    Procedure: COLONOSCOPY;  Surgeon: Jared Antonio MD;  Location: [facility];  Service: Endoscopy;  Laterality: N/A;    HYSTERECTOMY      OOPHORECTOMY      SHOULDER SURGERY      R     Past Medical History:   Diagnosis Date    Arthritis     Complete tear of left rotator cuff 11/09/2017    COPD (chronic obstructive pulmonary disease)     COVID-19 infection 07/02/2021    Diabetes mellitus     H/o Cerebrovascular accident (CVA) of left pontine structure 12/12/2019    Hypertension     Personal history of colonic polyps     Stroke     Wears glasses      Family History   Problem Relation Age of Onset    Diabetes Mother     Asthma Mother     Hypertension Mother     Diabetes Father     Diabetes Brother     Hypertension Brother     Anemia Daughter     Glaucoma Neg Hx     Macular degeneration Neg Hx     Retinal detachment Neg Hx         Social History:   Marital Status: Single  Alcohol History:  reports no history of alcohol use.  Tobacco History:  reports that she has never smoked. She has never used smokeless tobacco.  Drug History:  reports no history of drug use.    Review of patient's allergies indicates:  No Known Allergies    Current Outpatient Medications   Medication Sig Dispense Refill    atorvastatin (LIPITOR) 40 MG tablet Take 1 tablet (40 mg total) by mouth once daily. 90 tablet 1    blood glucose control, low Soln Use as directed (Patient taking differently: 1 each by Misc.(Non-Drug; Combo Route) route. Use as directed) 1 each 1    blood sugar diagnostic (TRUE METRIX GLUCOSE TEST STRIP) Strp Test blood sugar twice daily 200 strip 3    brimonidine 0.2% (ALPHAGAN) 0.2 % Drop Place 1 drop into both eyes every 12 (twelve) hours. 15 mL 11    cloNIDine 0.1 mg/24 hr td ptwk (CATAPRES) 0.1 mg/24 hr Place 1 patch onto the skin every 7 days. 12 patch 1    clopidogreL (PLAVIX) 75 mg tablet Take 1 tablet (75 mg total) by mouth once daily. 90 tablet 1    ergocalciferol  "(ERGOCALCIFEROL) 50,000 unit Cap Take 1 capsule (50,000 Units total) by mouth every 7 days. 12 capsule 1    hydrALAZINE (APRESOLINE) 100 MG tablet Take 1 tablet (100 mg total) by mouth 3 (three) times daily. 270 tablet 1    HYDROcodone-acetaminophen (NORCO)  mg per tablet       insulin aspart U-100 (NOVOLOG FLEXPEN U-100 INSULIN) 100 unit/mL (3 mL) InPn pen Inject 14 Units subcutaneously into the skin 3 (three) times daily before meals. 45 mL 3    insulin degludec (TRESIBA FLEXTOUCH U-100) 100 unit/mL (3 mL) insulin pen ADMINISTER 40 UNITS UNDER THE SKIN EVERY EVENING 12 pen 1    lancets Misc 1 lancet by Misc.(Non-Drug; Combo Route) route 2 (two) times a day. To check BG 2 times daily, to use with insurance preferred meter 200 each 3    losartan (COZAAR) 50 MG tablet Take 1 tablet (50 mg total) by mouth once daily. 90 tablet 1    metoprolol succinate (TOPROL-XL) 100 MG 24 hr tablet Take 1 tablet (100 mg total) by mouth once daily. 90 tablet 1    pen needle, diabetic (BD ULTRA-FINE SHORT PEN NEEDLE) 31 gauge x 5/16" Ndle 1 pen by Misc.(Non-Drug; Combo Route) route 4 (four) times daily. 300 each 5    tiotropium (SPIRIVA) 18 mcg inhalation capsule Inhale 1 capsule (18 mcg total) into the lungs once daily. Controller 90 capsule 3     No current facility-administered medications for this visit.       Review of Systems   Constitutional:  Negative for chills, fatigue, fever and unexpected weight change.   HENT:  Negative for hearing loss and trouble swallowing.    Eyes:  Negative for photophobia and visual disturbance.   Respiratory:  Negative for cough, shortness of breath and wheezing.    Cardiovascular:  Positive for leg swelling. Negative for chest pain and palpitations.   Gastrointestinal:  Negative for abdominal pain and nausea.   Genitourinary:  Negative for dysuria and frequency.   Musculoskeletal:  Negative for arthralgias, back pain, gait problem, joint swelling and myalgias.   Skin:  Positive for wound. " Negative for rash.   Neurological:  Negative for tremors, seizures, weakness, numbness and headaches.   Hematological:  Does not bruise/bleed easily.       Objective:        Physical Exam:   Foot Exam  Physical Exam  Ortho/SPM Exam     Imaging:            Assessment:       1. Venous stasis ulcer of other part of right lower leg with fat layer exposed, unspecified whether varicose veins present    2. Non-pressure chronic ulcer of right lower leg with fat layer exposed    3. Bilateral lower extremity edema    4. At high risk for inadequate nutritional intake    5. Type 2 diabetes mellitus with hyperglycemia, unspecified whether long term insulin use    6. Type 2 diabetes mellitus with diabetic nephropathy, with long-term current use of insulin    7. Type 2 diabetes mellitus with both eyes affected by moderate nonproliferative retinopathy and macular edema, with long-term current use of insulin    8. History of ulcer of lower extremity    9. 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 Type 2 diabetes mellitus with diabetic neuropathy, unspecified whether long term insulin use      Plan:   Venous stasis ulcer of other part of right lower leg with fat layer exposed, unspecified whether varicose veins present    Non-pressure chronic ulcer of right lower leg with fat layer exposed    Bilateral lower extremity edema    At high risk for inadequate nutritional intake    Type 2 diabetes mellitus with hyperglycemia, unspecified whether long term insulin use    Type 2 diabetes mellitus with diabetic nephropathy, with long-term current use of insulin    Type 2 diabetes mellitus with both eyes affected by moderate nonproliferative retinopathy and macular edema, with long-term current use of insulin    History of ulcer of lower extremity    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    Procedures          Counseling:     I provided patient education verbally regarding:   Patient diagnosis, treatment options, as well as alternatives, risks, and benefits.     This note was created using Dragon voice recognition software that occasionally misinterpreted phrases or words.

## 2023-04-16 NOTE — PROGRESS NOTES
[address] [facility] [address]  [place] [address]  Phone: [phone]   [phone]    Patient's PCP:Cristina Ren MD  Referring Provider: No ref. provider found    Subjective:      Chief Complaint:: Wound Care, Venous Ulcer, Venous Stasis, Leg Swelling, Non-healing Wound, Wound Check, Difficulty Walking, Diabetes, and Diabetic Foot Exam    HPI   Steff Johnson is a 63 y.o. female who presents today with a complaint of a right lower leg venous ulcer   See wound docs documentation for full assessment and evaluation of wound.      Additionally, patient presents for a diabetic foot exam.     1.Debridement See Wound Docs for assessment of wounds and procedure notes  2. Continue taking all medications as prescribed  3. Continue home health dressing changes  4. RTC one week   5. Counseled patient on increasing protein intake, not getting wound wet, keeping dressing clean dry and intact, following a healthy diet, elevating legs when able, removing pressure from wound     Counseling/Education:  I provided patient education verbally regarding:   The aspects of diabetes and how it pertains to the feet. I explained the importance of proper diabetic foot care and how it is essential for the health of their feet.     I discussed the importance of knowing their Hemoglobin A1c and that the level needs to be as close to 6 as possible. I discussed the increase complications of high blood sugar including stroke, blindness, heart attack, kidney failure and loss of limb secondary to neuropathy and PVD.      With neuropathy, beware of any breaks in the skin or redness. These areas are not recognized early due to the numbness.     I discussed Diabetes, lower back issues, metabolic disorders, systemic causes, chemotherapy, vitamin deficiency, heavy metal exposure, as some of the causes. I also explained that as much as 40% of the time we can not find a cause. I discussed different treatments available to control the symptoms but  which may not cure the problem.         Counseled patient on the aspects of diabetes and how it pertains to the feet.  I explained the importance of proper diabetic foot care and how it is essential for the health of their feet.        Shoe inspection. Patient instructed on proper foot hygeine. We discussed wearing proper shoe gear, daily foot inspections, never walking without protective shoe gear, never putting sharp instruments to feet, routine podiatric nail visits every 2-3 months.       Patient should call the office immediately if any signs of infection, such as fever, chills, sweats, increased redness or pain.     Patient was instructed to call the clinic or go to the emergency department if their symptoms do not improve, worsens, or if new symptoms develop.  Patient was advised that if any increased swelling, pain, or numbness arise to go immediately to the ED. Patient knows to call any time if an emergency arises. Shared decision making occurred and patient verbalized understanding in agreement with this plan.      I counseled the patient on their conditions, their implications and medical management.     >50% of this > 60 minute visit was spent face to face educating/counseling the patient     I spent a total of 60 minutes on the day of the visit.This includes face to face time and non-face to face time preparing to see the patient (eg, review of tests), obtaining and/or reviewing separately obtained history, documenting clinical information in the electronic or other health record, independently interpreting results and communicating results to the patient/family/caregiver, or care coordinator.        Much of the documentation for this visit was completed in the Wound Docs system.  Please see the attached documentation for further details about the patient's care. Scanned under the Media tab.         Dorys Crespo DPM     Vitals:    04/18/23 0950   BP: (!) 208/106   Resp: 18   Temp: 98.2 °F (36.8 °C)  "  Weight: 62.6 kg (138 lb)   Height: 4' 11" (1.499 m)   PainSc: 0-No pain      Shoe Size:     Past Surgical History:   Procedure Laterality Date    COLONOSCOPY N/A 4/11/2017    Procedure: COLONOSCOPY;  Surgeon: Jared Antonio MD;  Location: [facility];  Service: Endoscopy;  Laterality: N/A;    HYSTERECTOMY      OOPHORECTOMY      SHOULDER SURGERY      R     Past Medical History:   Diagnosis Date    Arthritis     Complete tear of left rotator cuff 11/09/2017    COPD (chronic obstructive pulmonary disease)     COVID-19 infection 07/02/2021    Diabetes mellitus     H/o Cerebrovascular accident (CVA) of left pontine structure 12/12/2019    Hypertension     Personal history of colonic polyps     Stroke     Wears glasses      Family History   Problem Relation Age of Onset    Diabetes Mother     Asthma Mother     Hypertension Mother     Diabetes Father     Diabetes Brother     Hypertension Brother     Anemia Daughter     Glaucoma Neg Hx     Macular degeneration Neg Hx     Retinal detachment Neg Hx         Social History:   Marital Status: Single  Alcohol History:  reports no history of alcohol use.  Tobacco History:  reports that she has never smoked. She has never used smokeless tobacco.  Drug History:  reports no history of drug use.    Review of patient's allergies indicates:  No Known Allergies    Current Outpatient Medications   Medication Sig Dispense Refill    atorvastatin (LIPITOR) 40 MG tablet Take 1 tablet (40 mg total) by mouth once daily. 90 tablet 1    blood glucose control, low Soln Use as directed (Patient taking differently: 1 each by Misc.(Non-Drug; Combo Route) route. Use as directed) 1 each 1    blood sugar diagnostic (TRUE METRIX GLUCOSE TEST STRIP) Strp Test blood sugar twice daily 200 strip 3    brimonidine 0.2% (ALPHAGAN) 0.2 % Drop Place 1 drop into both eyes every 12 (twelve) hours. 15 mL 11    cloNIDine 0.1 mg/24 hr td ptwk (CATAPRES) 0.1 mg/24 hr Place 1 patch onto the skin every 7 days. 12 " "patch 1    clopidogreL (PLAVIX) 75 mg tablet Take 1 tablet (75 mg total) by mouth once daily. 90 tablet 1    ergocalciferol (ERGOCALCIFEROL) 50,000 unit Cap Take 1 capsule (50,000 Units total) by mouth every 7 days. 12 capsule 1    hydrALAZINE (APRESOLINE) 100 MG tablet Take 1 tablet (100 mg total) by mouth 3 (three) times daily. 270 tablet 1    HYDROcodone-acetaminophen (NORCO)  mg per tablet       insulin aspart U-100 (NOVOLOG FLEXPEN U-100 INSULIN) 100 unit/mL (3 mL) InPn pen Inject 14 Units subcutaneously into the skin 3 (three) times daily before meals. 45 mL 3    insulin degludec (TRESIBA FLEXTOUCH U-100) 100 unit/mL (3 mL) insulin pen ADMINISTER 40 UNITS UNDER THE SKIN EVERY EVENING 12 pen 1    lancets Misc 1 lancet by Misc.(Non-Drug; Combo Route) route 2 (two) times a day. To check BG 2 times daily, to use with insurance preferred meter 200 each 3    losartan (COZAAR) 50 MG tablet Take 1 tablet (50 mg total) by mouth once daily. 90 tablet 1    metoprolol succinate (TOPROL-XL) 100 MG 24 hr tablet Take 1 tablet (100 mg total) by mouth once daily. 90 tablet 1    pen needle, diabetic (BD ULTRA-FINE SHORT PEN NEEDLE) 31 gauge x 5/16" Ndle 1 pen by Misc.(Non-Drug; Combo Route) route 4 (four) times daily. 300 each 5    tiotropium (SPIRIVA) 18 mcg inhalation capsule Inhale 1 capsule (18 mcg total) into the lungs once daily. Controller 90 capsule 3     No current facility-administered medications for this visit.       Review of Systems   Constitutional:  Negative for chills, fatigue, fever and unexpected weight change.   HENT:  Negative for hearing loss and trouble swallowing.    Eyes:  Negative for photophobia and visual disturbance.   Respiratory:  Negative for cough, shortness of breath and wheezing.    Cardiovascular:  Positive for leg swelling. Negative for chest pain and palpitations.   Gastrointestinal:  Negative for abdominal pain and nausea.   Genitourinary:  Negative for dysuria and frequency. "   Musculoskeletal:  Negative for arthralgias, back pain, gait problem, joint swelling and myalgias.   Skin:  Positive for wound. Negative for rash.   Neurological:  Negative for tremors, seizures, weakness, numbness and headaches.   Hematological:  Does not bruise/bleed easily.       Objective:        Physical Exam:   Foot Exam  Physical Exam  Ortho/SPM Exam     Imaging:            Assessment:       1. Venous stasis ulcer of other part of right lower leg with fat layer exposed, unspecified whether varicose veins present    2. Non-pressure chronic ulcer of right lower leg with fat layer exposed    3. Bilateral lower extremity edema    4. At high risk for inadequate nutritional intake    5. History of ulcer of lower extremity    6. Tinea pedis of both feet    7. Venous stasis ulcer of other part of right lower leg with necrosis of muscle, unspecified whether varicose veins present    8. Type 2 diabetes mellitus with both eyes affected by moderate nonproliferative retinopathy and macular edema, with long-term current use of insulin    9. Type 2 diabetes mellitus with diabetic nephropathy, with long-term current use of insulin    10. Type 2 diabetes mellitus with diabetic neuropathy, unspecified whether long term insulin use    11. 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 Type 2 diabetes mellitus with hyperglycemia, unspecified whether long term insulin use    13. Fissure in skin of both feet    14. Encounter for diabetic foot exam    15. Comprehensive diabetic foot examination, type 2 DM, encounter for      Plan:   Venous stasis ulcer of other part of right lower leg with fat layer exposed, unspecified whether varicose veins present    Non-pressure chronic ulcer of right lower leg with fat layer exposed    Bilateral lower extremity edema    At high risk for inadequate nutritional intake    History of ulcer of lower extremity    Tinea pedis of both feet    Venous stasis ulcer of other  part of right lower leg with necrosis of muscle, unspecified whether varicose veins present    Type 2 diabetes mellitus with both eyes affected by moderate nonproliferative retinopathy and macular edema, with long-term current use of insulin    Type 2 diabetes mellitus with diabetic nephropathy, with long-term current use of insulin    Type 2 diabetes mellitus with diabetic neuropathy, unspecified whether long term insulin use  -      DIABETES FOOT EXAM    Non-pressure chronic ulcer of right lower leg with necrosis of muscle    Type 2 diabetes mellitus with hyperglycemia, unspecified whether long term insulin use    Fissure in skin of both feet    Encounter for diabetic foot exam  -      DIABETES FOOT EXAM    Comprehensive diabetic foot examination, type 2 DM, encounter for  -      DIABETES FOOT EXAM      Follow up in about 1 week (around 4/25/2023).    Procedures          Counseling:     I provided patient education verbally regarding:   Patient diagnosis, treatment options, as well as alternatives, risks, and benefits.     This note was created using Dragon voice recognition software that occasionally misinterpreted phrases or words.

## 2023-04-18 ENCOUNTER — OFFICE VISIT (OUTPATIENT)
Dept: WOUND CARE | Facility: HOSPITAL | Age: 63
End: 2023-04-18
Attending: PODIATRIST
Payer: MEDICARE

## 2023-04-18 VITALS
HEIGHT: 59 IN | WEIGHT: 138 LBS | DIASTOLIC BLOOD PRESSURE: 106 MMHG | SYSTOLIC BLOOD PRESSURE: 208 MMHG | RESPIRATION RATE: 18 BRPM | BODY MASS INDEX: 27.82 KG/M2 | TEMPERATURE: 98 F

## 2023-04-18 DIAGNOSIS — E11.21 TYPE 2 DIABETES MELLITUS WITH DIABETIC NEPHROPATHY, WITH LONG-TERM CURRENT USE OF INSULIN: ICD-10-CM

## 2023-04-18 DIAGNOSIS — L97.913 NON-PRESSURE CHRONIC ULCER OF RIGHT LOWER LEG WITH NECROSIS OF MUSCLE: ICD-10-CM

## 2023-04-18 DIAGNOSIS — R23.4 FISSURE IN SKIN OF BOTH FEET: ICD-10-CM

## 2023-04-18 DIAGNOSIS — I83.018 VENOUS STASIS ULCER OF OTHER PART OF RIGHT LOWER LEG WITH FAT LAYER EXPOSED, UNSPECIFIED WHETHER VARICOSE VEINS PRESENT: Primary | ICD-10-CM

## 2023-04-18 DIAGNOSIS — L97.812 VENOUS STASIS ULCER OF OTHER PART OF RIGHT LOWER LEG WITH FAT LAYER EXPOSED, UNSPECIFIED WHETHER VARICOSE VEINS PRESENT: Primary | ICD-10-CM

## 2023-04-18 DIAGNOSIS — E11.65 TYPE 2 DIABETES MELLITUS WITH HYPERGLYCEMIA, UNSPECIFIED WHETHER LONG TERM INSULIN USE: ICD-10-CM

## 2023-04-18 DIAGNOSIS — L97.813 VENOUS STASIS ULCER OF OTHER PART OF RIGHT LOWER LEG WITH NECROSIS OF MUSCLE, UNSPECIFIED WHETHER VARICOSE VEINS PRESENT: ICD-10-CM

## 2023-04-18 DIAGNOSIS — Z79.4 TYPE 2 DIABETES MELLITUS WITH DIABETIC NEPHROPATHY, WITH LONG-TERM CURRENT USE OF INSULIN: ICD-10-CM

## 2023-04-18 DIAGNOSIS — I83.018 VENOUS STASIS ULCER OF OTHER PART OF RIGHT LOWER LEG WITH NECROSIS OF MUSCLE, UNSPECIFIED WHETHER VARICOSE VEINS PRESENT: ICD-10-CM

## 2023-04-18 DIAGNOSIS — Z91.89 AT HIGH RISK FOR INADEQUATE NUTRITIONAL INTAKE: ICD-10-CM

## 2023-04-18 DIAGNOSIS — L97.912 NON-PRESSURE CHRONIC ULCER OF RIGHT LOWER LEG WITH FAT LAYER EXPOSED: ICD-10-CM

## 2023-04-18 DIAGNOSIS — R60.0 BILATERAL LOWER EXTREMITY EDEMA: ICD-10-CM

## 2023-04-18 DIAGNOSIS — E11.9 COMPREHENSIVE DIABETIC FOOT EXAMINATION, TYPE 2 DM, ENCOUNTER FOR: ICD-10-CM

## 2023-04-18 DIAGNOSIS — B35.3 TINEA PEDIS OF BOTH FEET: ICD-10-CM

## 2023-04-18 DIAGNOSIS — Z79.4 TYPE 2 DIABETES MELLITUS WITH BOTH EYES AFFECTED BY MODERATE NONPROLIFERATIVE RETINOPATHY AND MACULAR EDEMA, WITH LONG-TERM CURRENT USE OF INSULIN: ICD-10-CM

## 2023-04-18 DIAGNOSIS — Z87.2 HISTORY OF ULCER OF LOWER EXTREMITY: ICD-10-CM

## 2023-04-18 DIAGNOSIS — E11.9 ENCOUNTER FOR DIABETIC FOOT EXAM: ICD-10-CM

## 2023-04-18 DIAGNOSIS — E11.3313 TYPE 2 DIABETES MELLITUS WITH BOTH EYES AFFECTED BY MODERATE NONPROLIFERATIVE RETINOPATHY AND MACULAR EDEMA, WITH LONG-TERM CURRENT USE OF INSULIN: ICD-10-CM

## 2023-04-18 DIAGNOSIS — E11.40 TYPE 2 DIABETES MELLITUS WITH DIABETIC NEUROPATHY, UNSPECIFIED WHETHER LONG TERM INSULIN USE: ICD-10-CM

## 2023-04-18 PROCEDURE — 3060F POS MICROALBUMINURIA REV: CPT | Mod: CPTII,,, | Performed by: PODIATRIST

## 2023-04-18 PROCEDURE — 3060F PR POS MICROALBUMINURIA RESULT DOCUMENTED/REVIEW: ICD-10-PCS | Mod: CPTII,,, | Performed by: PODIATRIST

## 2023-04-18 PROCEDURE — 3008F BODY MASS INDEX DOCD: CPT | Mod: CPTII,,, | Performed by: PODIATRIST

## 2023-04-18 PROCEDURE — 3080F DIAST BP >= 90 MM HG: CPT | Mod: CPTII,,, | Performed by: PODIATRIST

## 2023-04-18 PROCEDURE — 1101F PR PT FALLS ASSESS DOC 0-1 FALLS W/OUT INJ PAST YR: ICD-10-PCS | Mod: CPTII,,, | Performed by: PODIATRIST

## 2023-04-18 PROCEDURE — 3077F SYST BP >= 140 MM HG: CPT | Mod: CPTII,,, | Performed by: PODIATRIST

## 2023-04-18 PROCEDURE — 1160F PR REVIEW ALL MEDS BY PRESCRIBER/CLIN PHARMACIST DOCUMENTED: ICD-10-PCS | Mod: CPTII,,, | Performed by: PODIATRIST

## 2023-04-18 PROCEDURE — 3066F PR DOCUMENTATION OF TREATMENT FOR NEPHROPATHY: ICD-10-PCS | Mod: CPTII,,, | Performed by: PODIATRIST

## 2023-04-18 PROCEDURE — 3066F NEPHROPATHY DOC TX: CPT | Mod: CPTII,,, | Performed by: PODIATRIST

## 2023-04-18 PROCEDURE — 3080F PR MOST RECENT DIASTOLIC BLOOD PRESSURE >= 90 MM HG: ICD-10-PCS | Mod: CPTII,,, | Performed by: PODIATRIST

## 2023-04-18 PROCEDURE — 3052F HG A1C>EQUAL 8.0%<EQUAL 9.0%: CPT | Mod: CPTII,,, | Performed by: PODIATRIST

## 2023-04-18 PROCEDURE — 4010F ACE/ARB THERAPY RXD/TAKEN: CPT | Mod: CPTII,,, | Performed by: PODIATRIST

## 2023-04-18 PROCEDURE — 1159F PR MEDICATION LIST DOCUMENTED IN MEDICAL RECORD: ICD-10-PCS | Mod: CPTII,,, | Performed by: PODIATRIST

## 2023-04-18 PROCEDURE — 11042 DBRDMT SUBQ TIS 1ST 20SQCM/<: CPT | Mod: ,,, | Performed by: PODIATRIST

## 2023-04-18 PROCEDURE — 3008F PR BODY MASS INDEX (BMI) DOCUMENTED: ICD-10-PCS | Mod: CPTII,,, | Performed by: PODIATRIST

## 2023-04-18 PROCEDURE — 11042 DBRDMT SUBQ TIS 1ST 20SQCM/<: CPT | Performed by: PODIATRIST

## 2023-04-18 PROCEDURE — 1101F PT FALLS ASSESS-DOCD LE1/YR: CPT | Mod: CPTII,,, | Performed by: PODIATRIST

## 2023-04-18 PROCEDURE — 1160F RVW MEDS BY RX/DR IN RCRD: CPT | Mod: CPTII,,, | Performed by: PODIATRIST

## 2023-04-18 PROCEDURE — 99499 RISK ADDL DX/OHS AUDIT: ICD-10-PCS | Mod: HCNC,,, | Performed by: PODIATRIST

## 2023-04-18 PROCEDURE — 3288F PR FALLS RISK ASSESSMENT DOCUMENTED: ICD-10-PCS | Mod: CPTII,,, | Performed by: PODIATRIST

## 2023-04-18 PROCEDURE — 99499 UNLISTED E&M SERVICE: CPT | Mod: HCNC,,, | Performed by: PODIATRIST

## 2023-04-18 PROCEDURE — 1159F MED LIST DOCD IN RCRD: CPT | Mod: CPTII,,, | Performed by: PODIATRIST

## 2023-04-18 PROCEDURE — 99214 PR OFFICE/OUTPT VISIT, EST, LEVL IV, 30-39 MIN: ICD-10-PCS | Mod: 25,,, | Performed by: PODIATRIST

## 2023-04-18 PROCEDURE — 11042 PR DEBRIDEMENT, SKIN, SUB-Q TISSUE,=<20 SQ CM: ICD-10-PCS | Mod: ,,, | Performed by: PODIATRIST

## 2023-04-18 PROCEDURE — 3288F FALL RISK ASSESSMENT DOCD: CPT | Mod: CPTII,,, | Performed by: PODIATRIST

## 2023-04-18 PROCEDURE — 99214 OFFICE O/P EST MOD 30 MIN: CPT | Mod: 25,,, | Performed by: PODIATRIST

## 2023-04-18 PROCEDURE — 3077F PR MOST RECENT SYSTOLIC BLOOD PRESSURE >= 140 MM HG: ICD-10-PCS | Mod: CPTII,,, | Performed by: PODIATRIST

## 2023-04-18 PROCEDURE — 3052F PR MOST RECENT HEMOGLOBIN A1C LEVEL 8.0 - < 9.0%: ICD-10-PCS | Mod: CPTII,,, | Performed by: PODIATRIST

## 2023-04-18 PROCEDURE — 4010F PR ACE/ARB THEARPY RXD/TAKEN: ICD-10-PCS | Mod: CPTII,,, | Performed by: PODIATRIST

## 2023-04-19 ENCOUNTER — OFFICE VISIT (OUTPATIENT)
Dept: OPHTHALMOLOGY | Facility: CLINIC | Age: 63
End: 2023-04-19
Payer: MEDICARE

## 2023-04-19 DIAGNOSIS — Z79.4 TYPE 2 DIABETES MELLITUS WITH BOTH EYES AFFECTED BY MODERATE NONPROLIFERATIVE RETINOPATHY AND MACULAR EDEMA, WITH LONG-TERM CURRENT USE OF INSULIN: Primary | ICD-10-CM

## 2023-04-19 DIAGNOSIS — E11.3313 TYPE 2 DIABETES MELLITUS WITH BOTH EYES AFFECTED BY MODERATE NONPROLIFERATIVE RETINOPATHY AND MACULAR EDEMA, WITH LONG-TERM CURRENT USE OF INSULIN: Primary | ICD-10-CM

## 2023-04-19 DIAGNOSIS — H25.813 COMBINED FORMS OF AGE-RELATED CATARACT, BILATERAL: ICD-10-CM

## 2023-04-19 DIAGNOSIS — H40.053 OCULAR HYPERTENSION, BILATERAL: ICD-10-CM

## 2023-04-19 PROCEDURE — 3060F POS MICROALBUMINURIA REV: CPT | Mod: CPTII,S$GLB,, | Performed by: OPHTHALMOLOGY

## 2023-04-19 PROCEDURE — 99999 PR PBB SHADOW E&M-EST. PATIENT-LVL III: CPT | Mod: PBBFAC,,, | Performed by: OPHTHALMOLOGY

## 2023-04-19 PROCEDURE — 99214 PR OFFICE/OUTPT VISIT, EST, LEVL IV, 30-39 MIN: ICD-10-PCS | Mod: S$GLB,,, | Performed by: OPHTHALMOLOGY

## 2023-04-19 PROCEDURE — 1159F PR MEDICATION LIST DOCUMENTED IN MEDICAL RECORD: ICD-10-PCS | Mod: CPTII,S$GLB,, | Performed by: OPHTHALMOLOGY

## 2023-04-19 PROCEDURE — 3066F PR DOCUMENTATION OF TREATMENT FOR NEPHROPATHY: ICD-10-PCS | Mod: CPTII,S$GLB,, | Performed by: OPHTHALMOLOGY

## 2023-04-19 PROCEDURE — 4010F ACE/ARB THERAPY RXD/TAKEN: CPT | Mod: CPTII,S$GLB,, | Performed by: OPHTHALMOLOGY

## 2023-04-19 PROCEDURE — 3288F PR FALLS RISK ASSESSMENT DOCUMENTED: ICD-10-PCS | Mod: CPTII,S$GLB,, | Performed by: OPHTHALMOLOGY

## 2023-04-19 PROCEDURE — 4010F PR ACE/ARB THEARPY RXD/TAKEN: ICD-10-PCS | Mod: CPTII,S$GLB,, | Performed by: OPHTHALMOLOGY

## 2023-04-19 PROCEDURE — 1159F MED LIST DOCD IN RCRD: CPT | Mod: CPTII,S$GLB,, | Performed by: OPHTHALMOLOGY

## 2023-04-19 PROCEDURE — 3052F HG A1C>EQUAL 8.0%<EQUAL 9.0%: CPT | Mod: CPTII,S$GLB,, | Performed by: OPHTHALMOLOGY

## 2023-04-19 PROCEDURE — 3060F PR POS MICROALBUMINURIA RESULT DOCUMENTED/REVIEW: ICD-10-PCS | Mod: CPTII,S$GLB,, | Performed by: OPHTHALMOLOGY

## 2023-04-19 PROCEDURE — 92134 OCT, RETINA - OU - BOTH EYES: ICD-10-PCS | Mod: S$GLB,,, | Performed by: OPHTHALMOLOGY

## 2023-04-19 PROCEDURE — 1101F PR PT FALLS ASSESS DOC 0-1 FALLS W/OUT INJ PAST YR: ICD-10-PCS | Mod: CPTII,S$GLB,, | Performed by: OPHTHALMOLOGY

## 2023-04-19 PROCEDURE — 99999 PR PBB SHADOW E&M-EST. PATIENT-LVL III: ICD-10-PCS | Mod: PBBFAC,,, | Performed by: OPHTHALMOLOGY

## 2023-04-19 PROCEDURE — 1160F PR REVIEW ALL MEDS BY PRESCRIBER/CLIN PHARMACIST DOCUMENTED: ICD-10-PCS | Mod: CPTII,S$GLB,, | Performed by: OPHTHALMOLOGY

## 2023-04-19 PROCEDURE — 1160F RVW MEDS BY RX/DR IN RCRD: CPT | Mod: CPTII,S$GLB,, | Performed by: OPHTHALMOLOGY

## 2023-04-19 PROCEDURE — 3066F NEPHROPATHY DOC TX: CPT | Mod: CPTII,S$GLB,, | Performed by: OPHTHALMOLOGY

## 2023-04-19 PROCEDURE — 3288F FALL RISK ASSESSMENT DOCD: CPT | Mod: CPTII,S$GLB,, | Performed by: OPHTHALMOLOGY

## 2023-04-19 PROCEDURE — 92134 CPTRZ OPH DX IMG PST SGM RTA: CPT | Mod: S$GLB,,, | Performed by: OPHTHALMOLOGY

## 2023-04-19 PROCEDURE — 3052F PR MOST RECENT HEMOGLOBIN A1C LEVEL 8.0 - < 9.0%: ICD-10-PCS | Mod: CPTII,S$GLB,, | Performed by: OPHTHALMOLOGY

## 2023-04-19 PROCEDURE — 99214 OFFICE O/P EST MOD 30 MIN: CPT | Mod: S$GLB,,, | Performed by: OPHTHALMOLOGY

## 2023-04-19 PROCEDURE — 1101F PT FALLS ASSESS-DOCD LE1/YR: CPT | Mod: CPTII,S$GLB,, | Performed by: OPHTHALMOLOGY

## 2023-04-19 NOTE — PROGRESS NOTES
HPI    Pt presents for 6 week dfe/oct     States no current ocular complaints.     No current ocular meds         Last edited by Louann Hoover on 4/19/2023  2:05 PM.             A/P    ICD-10-CM ICD-9-CM   1. Type 2 diabetes mellitus with both eyes affected by moderate nonproliferative retinopathy and macular edema, with long-term current use of insulin  E11.3313 250.50    Z79.4 362.05     362.07     V58.67   2. Ocular hypertension, bilateral  H40.053 365.04   3. Combined forms of age-related cataract, bilateral  H25.813 366.19         1. Type 2 diabetes mellitus with both eyes affected by moderate nonproliferative retinopathy and macular edema, with long-term current use of insulin  PCP Cristina Ren MD  02/22/2023  8.9  A1C     Pt had recent strokes recently, in wheelchair    OD: s/p ODX 3/8/23  [facility] 20/30 (was 20/40) , improved IRF after ODX  Plan: observe today        OS: s/p ODX 3/8/23  [facility] 20/30 (was 20/40) , improved  IRF after ODX  Plan: observe today, will likely need ODX next visit if improving    Recommend good blood pressure control, tight blood glucose control, and good cholesterol control     2. Ocular hypertension, bilateral  IOP 27/28 on Brim  BID  F/b Dr. Ambrose  Stressed compliance  Plan: incr brim to TID, thick corneas     3. Combined forms of age-related cataract, bilateral  Mod NS, borderline VS  Plan: Observation, update Mrx     RTC Fi[place]lla 6 weeks DFE/OCTm OU , possible Ozurdex OU   RTC Arnol as scheduled     I saw and examined the patient and reviewed in detail the findings documented. The final examination findings, image interpretations, and plan as documented in the record represent my personal judgment and conclusions.    Fito Barros MD  Vitreoretinal Surgery   [facility]

## 2023-04-20 ENCOUNTER — TELEPHONE (OUTPATIENT)
Dept: ENDOCRINOLOGY | Facility: CLINIC | Age: 63
End: 2023-04-20
Payer: MEDICARE

## 2023-04-20 NOTE — TELEPHONE ENCOUNTER
----- Message from Sara Fletcher sent at 4/20/2023  8:20 AM CDT -----  Regarding: appt access  Contact: patient at [phone]  Who Called:  patient    Best Call Back Number: [phone]    Additional Information: Patient had a tire blow out today and won't make the appointment. They would like to reschedule for another date. No appointment came up in the system. Please call and advise. Thank you

## 2023-04-25 ENCOUNTER — OFFICE VISIT (OUTPATIENT)
Dept: WOUND CARE | Facility: HOSPITAL | Age: 63
End: 2023-04-25
Attending: PODIATRIST
Payer: MEDICARE

## 2023-04-25 VITALS
HEIGHT: 59 IN | RESPIRATION RATE: 18 BRPM | WEIGHT: 138 LBS | HEART RATE: 88 BPM | TEMPERATURE: 98 F | BODY MASS INDEX: 27.82 KG/M2

## 2023-04-25 DIAGNOSIS — R23.4 FISSURE IN SKIN OF BOTH FEET: ICD-10-CM

## 2023-04-25 DIAGNOSIS — I83.018 VENOUS STASIS ULCER OF OTHER PART OF RIGHT LOWER LEG WITH FAT LAYER EXPOSED, UNSPECIFIED WHETHER VARICOSE VEINS PRESENT: Primary | ICD-10-CM

## 2023-04-25 DIAGNOSIS — L97.813 VENOUS STASIS ULCER OF OTHER PART OF RIGHT LOWER LEG WITH NECROSIS OF MUSCLE, UNSPECIFIED WHETHER VARICOSE VEINS PRESENT: ICD-10-CM

## 2023-04-25 DIAGNOSIS — R60.0 BILATERAL LOWER EXTREMITY EDEMA: ICD-10-CM

## 2023-04-25 DIAGNOSIS — L97.812 VENOUS STASIS ULCER OF OTHER PART OF RIGHT LOWER LEG WITH FAT LAYER EXPOSED, UNSPECIFIED WHETHER VARICOSE VEINS PRESENT: Primary | ICD-10-CM

## 2023-04-25 DIAGNOSIS — L97.913 NON-PRESSURE CHRONIC ULCER OF RIGHT LOWER LEG WITH NECROSIS OF MUSCLE: ICD-10-CM

## 2023-04-25 DIAGNOSIS — E11.3313 TYPE 2 DIABETES MELLITUS WITH BOTH EYES AFFECTED BY MODERATE NONPROLIFERATIVE RETINOPATHY AND MACULAR EDEMA, WITH LONG-TERM CURRENT USE OF INSULIN: ICD-10-CM

## 2023-04-25 DIAGNOSIS — I83.018 VENOUS STASIS ULCER OF OTHER PART OF RIGHT LOWER LEG WITH NECROSIS OF MUSCLE, UNSPECIFIED WHETHER VARICOSE VEINS PRESENT: ICD-10-CM

## 2023-04-25 DIAGNOSIS — Z91.89 AT HIGH RISK FOR INADEQUATE NUTRITIONAL INTAKE: ICD-10-CM

## 2023-04-25 DIAGNOSIS — Z79.4 TYPE 2 DIABETES MELLITUS WITH BOTH EYES AFFECTED BY MODERATE NONPROLIFERATIVE RETINOPATHY AND MACULAR EDEMA, WITH LONG-TERM CURRENT USE OF INSULIN: ICD-10-CM

## 2023-04-25 DIAGNOSIS — L97.912 NON-PRESSURE CHRONIC ULCER OF RIGHT LOWER LEG WITH FAT LAYER EXPOSED: ICD-10-CM

## 2023-04-25 DIAGNOSIS — E11.21 TYPE 2 DIABETES MELLITUS WITH DIABETIC NEPHROPATHY, WITH LONG-TERM CURRENT USE OF INSULIN: ICD-10-CM

## 2023-04-25 DIAGNOSIS — B35.3 TINEA PEDIS OF BOTH FEET: ICD-10-CM

## 2023-04-25 DIAGNOSIS — E11.40 TYPE 2 DIABETES MELLITUS WITH DIABETIC NEUROPATHY, UNSPECIFIED WHETHER LONG TERM INSULIN USE: ICD-10-CM

## 2023-04-25 DIAGNOSIS — Z87.2 HISTORY OF ULCER OF LOWER EXTREMITY: ICD-10-CM

## 2023-04-25 DIAGNOSIS — Z79.4 TYPE 2 DIABETES MELLITUS WITH DIABETIC NEPHROPATHY, WITH LONG-TERM CURRENT USE OF INSULIN: ICD-10-CM

## 2023-04-25 DIAGNOSIS — E11.65 TYPE 2 DIABETES MELLITUS WITH HYPERGLYCEMIA, UNSPECIFIED WHETHER LONG TERM INSULIN USE: ICD-10-CM

## 2023-04-25 PROCEDURE — 1101F PT FALLS ASSESS-DOCD LE1/YR: CPT | Mod: CPTII,,, | Performed by: PODIATRIST

## 2023-04-25 PROCEDURE — 4010F PR ACE/ARB THEARPY RXD/TAKEN: ICD-10-PCS | Mod: CPTII,,, | Performed by: PODIATRIST

## 2023-04-25 PROCEDURE — 99214 OFFICE O/P EST MOD 30 MIN: CPT | Mod: ,,, | Performed by: PODIATRIST

## 2023-04-25 PROCEDURE — 3066F NEPHROPATHY DOC TX: CPT | Mod: CPTII,,, | Performed by: PODIATRIST

## 2023-04-25 PROCEDURE — 3052F HG A1C>EQUAL 8.0%<EQUAL 9.0%: CPT | Mod: CPTII,,, | Performed by: PODIATRIST

## 2023-04-25 PROCEDURE — 3060F PR POS MICROALBUMINURIA RESULT DOCUMENTED/REVIEW: ICD-10-PCS | Mod: CPTII,,, | Performed by: PODIATRIST

## 2023-04-25 PROCEDURE — 99499 RISK ADDL DX/OHS AUDIT: ICD-10-PCS | Mod: HCNC,,, | Performed by: PODIATRIST

## 2023-04-25 PROCEDURE — 4010F ACE/ARB THERAPY RXD/TAKEN: CPT | Mod: CPTII,,, | Performed by: PODIATRIST

## 2023-04-25 PROCEDURE — 99499 UNLISTED E&M SERVICE: CPT | Mod: HCNC,,, | Performed by: PODIATRIST

## 2023-04-25 PROCEDURE — 3060F POS MICROALBUMINURIA REV: CPT | Mod: CPTII,,, | Performed by: PODIATRIST

## 2023-04-25 PROCEDURE — 3066F PR DOCUMENTATION OF TREATMENT FOR NEPHROPATHY: ICD-10-PCS | Mod: CPTII,,, | Performed by: PODIATRIST

## 2023-04-25 PROCEDURE — 1159F PR MEDICATION LIST DOCUMENTED IN MEDICAL RECORD: ICD-10-PCS | Mod: CPTII,,, | Performed by: PODIATRIST

## 2023-04-25 PROCEDURE — 99214 PR OFFICE/OUTPT VISIT, EST, LEVL IV, 30-39 MIN: ICD-10-PCS | Mod: ,,, | Performed by: PODIATRIST

## 2023-04-25 PROCEDURE — 3008F PR BODY MASS INDEX (BMI) DOCUMENTED: ICD-10-PCS | Mod: CPTII,,, | Performed by: PODIATRIST

## 2023-04-25 PROCEDURE — 1160F RVW MEDS BY RX/DR IN RCRD: CPT | Mod: CPTII,,, | Performed by: PODIATRIST

## 2023-04-25 PROCEDURE — 1101F PR PT FALLS ASSESS DOC 0-1 FALLS W/OUT INJ PAST YR: ICD-10-PCS | Mod: CPTII,,, | Performed by: PODIATRIST

## 2023-04-25 PROCEDURE — 99213 OFFICE O/P EST LOW 20 MIN: CPT | Performed by: PODIATRIST

## 2023-04-25 PROCEDURE — 3288F FALL RISK ASSESSMENT DOCD: CPT | Mod: CPTII,,, | Performed by: PODIATRIST

## 2023-04-25 PROCEDURE — 3008F BODY MASS INDEX DOCD: CPT | Mod: CPTII,,, | Performed by: PODIATRIST

## 2023-04-25 PROCEDURE — 1160F PR REVIEW ALL MEDS BY PRESCRIBER/CLIN PHARMACIST DOCUMENTED: ICD-10-PCS | Mod: CPTII,,, | Performed by: PODIATRIST

## 2023-04-25 PROCEDURE — 3052F PR MOST RECENT HEMOGLOBIN A1C LEVEL 8.0 - < 9.0%: ICD-10-PCS | Mod: CPTII,,, | Performed by: PODIATRIST

## 2023-04-25 PROCEDURE — 1159F MED LIST DOCD IN RCRD: CPT | Mod: CPTII,,, | Performed by: PODIATRIST

## 2023-04-25 PROCEDURE — 3288F PR FALLS RISK ASSESSMENT DOCUMENTED: ICD-10-PCS | Mod: CPTII,,, | Performed by: PODIATRIST

## 2023-04-25 NOTE — PROGRESS NOTES
[address] [facility] [address]  [place] [address]  Phone: [phone]   [phone]    Patient's PCP:Cristina Ren MD  Referring Provider: No ref. provider found    Subjective:      Chief Complaint:: Wound Care, Diabetes, Wound Check, Venous Stasis, Venous Ulcer, Non-healing Wound, Leg Swelling, and Difficulty Walking    HPI  Steff Johnson is a 63 y.o. female who presents today with a complaint of a right lower leg venous ulcer   See wound docs documentation for full assessment and evaluation of wound.         1.See Wound Docs for assessment of wounds and procedure notes  2. Continue taking all medications as prescribed  3. Continue home health dressing changes  4. RTC one week   5. Counseled patient on increasing protein intake, not getting wound wet, keeping dressing clean dry and intact, following a healthy diet, elevating legs when able, removing pressure from wound     Counseling/Education:  I provided patient education verbally regarding:   The aspects of diabetes and how it pertains to the feet. I explained the importance of proper diabetic foot care and how it is essential for the health of their feet.     I discussed the importance of knowing their Hemoglobin A1c and that the level needs to be as close to 6 as possible. I discussed the increase complications of high blood sugar including stroke, blindness, heart attack, kidney failure and loss of limb secondary to neuropathy and PVD.      With neuropathy, beware of any breaks in the skin or redness. These areas are not recognized early due to the numbness.     I discussed Diabetes, lower back issues, metabolic disorders, systemic causes, chemotherapy, vitamin deficiency, heavy metal exposure, as some of the causes. I also explained that as much as 40% of the time we can not find a cause. I discussed different treatments available to control the symptoms but which may not cure the problem.         Counseled patient on the aspects of diabetes and  "how it pertains to the feet.  I explained the importance of proper diabetic foot care and how it is essential for the health of their feet.        Shoe inspection. Patient instructed on proper foot hygeine. We discussed wearing proper shoe gear, daily foot inspections, never walking without protective shoe gear, never putting sharp instruments to feet, routine podiatric nail visits every 2-3 months.       Patient should call the office immediately if any signs of infection, such as fever, chills, sweats, increased redness or pain.     Patient was instructed to call the clinic or go to the emergency department if their symptoms do not improve, worsens, or if new symptoms develop.  Patient was advised that if any increased swelling, pain, or numbness arise to go immediately to the ED. Patient knows to call any time if an emergency arises. Shared decision making occurred and patient verbalized understanding in agreement with this plan.      I counseled the patient on their conditions, their implications and medical management.     >50% of this > 60 minute visit was spent face to face educating/counseling the patient     I spent a total of 60 minutes on the day of the visit.This includes face to face time and non-face to face time preparing to see the patient (eg, review of tests), obtaining and/or reviewing separately obtained history, documenting clinical information in the electronic or other health record, independently interpreting results and communicating results to the patient/family/caregiver, or care coordinator.        Much of the documentation for this visit was completed in the Wound Docs system.  Please see the attached documentation for further details about the patient's care. Scanned under the Media tab.         Dorys Crespo DPM     Vitals:    04/25/23 1041   Pulse: 88   Resp: 18   Temp: 98.3 °F (36.8 °C)   Weight: 62.6 kg (138 lb)   Height: 4' 11" (1.499 m)   PainSc: 0-No pain      Shoe Size: "     Past Surgical History:   Procedure Laterality Date    COLONOSCOPY N/A 4/11/2017    Procedure: COLONOSCOPY;  Surgeon: Jared Antonio MD;  Location: [facility];  Service: Endoscopy;  Laterality: N/A;    HYSTERECTOMY      OOPHORECTOMY      SHOULDER SURGERY      R     Past Medical History:   Diagnosis Date    Arthritis     Complete tear of left rotator cuff 11/09/2017    COPD (chronic obstructive pulmonary disease)     COVID-19 infection 07/02/2021    Diabetes mellitus     H/o Cerebrovascular accident (CVA) of left pontine structure 12/12/2019    Hypertension     Personal history of colonic polyps     Stroke     Wears glasses      Family History   Problem Relation Age of Onset    Diabetes Mother     Asthma Mother     Hypertension Mother     Diabetes Father     Diabetes Brother     Hypertension Brother     Anemia Daughter     Glaucoma Neg Hx     Macular degeneration Neg Hx     Retinal detachment Neg Hx         Social History:   Marital Status: Single  Alcohol History:  reports no history of alcohol use.  Tobacco History:  reports that she has never smoked. She has never used smokeless tobacco.  Drug History:  reports no history of drug use.    Review of patient's allergies indicates:  No Known Allergies    Current Outpatient Medications   Medication Sig Dispense Refill    atorvastatin (LIPITOR) 40 MG tablet Take 1 tablet (40 mg total) by mouth once daily. 90 tablet 1    blood glucose control, low Soln Use as directed (Patient taking differently: 1 each by Misc.(Non-Drug; Combo Route) route. Use as directed) 1 each 1    blood sugar diagnostic (TRUE METRIX GLUCOSE TEST STRIP) Strp Test blood sugar twice daily 200 strip 3    brimonidine 0.2% (ALPHAGAN) 0.2 % Drop Place 1 drop into both eyes every 12 (twelve) hours. 15 mL 11    cloNIDine 0.1 mg/24 hr td ptwk (CATAPRES) 0.1 mg/24 hr Place 1 patch onto the skin every 7 days. 12 patch 1    clopidogreL (PLAVIX) 75 mg tablet Take 1 tablet (75 mg total) by mouth once daily.  "90 tablet 1    ergocalciferol (ERGOCALCIFEROL) 50,000 unit Cap Take 1 capsule (50,000 Units total) by mouth every 7 days. 12 capsule 1    hydrALAZINE (APRESOLINE) 100 MG tablet Take 1 tablet (100 mg total) by mouth 3 (three) times daily. 270 tablet 1    HYDROcodone-acetaminophen (NORCO)  mg per tablet       insulin aspart U-100 (NOVOLOG FLEXPEN U-100 INSULIN) 100 unit/mL (3 mL) InPn pen Inject 14 Units subcutaneously into the skin 3 (three) times daily before meals. 45 mL 3    insulin degludec (TRESIBA FLEXTOUCH U-100) 100 unit/mL (3 mL) insulin pen ADMINISTER 40 UNITS UNDER THE SKIN EVERY EVENING 12 pen 1    lancets Misc 1 lancet by Misc.(Non-Drug; Combo Route) route 2 (two) times a day. To check BG 2 times daily, to use with insurance preferred meter 200 each 3    losartan (COZAAR) 50 MG tablet Take 1 tablet (50 mg total) by mouth once daily. 90 tablet 1    metoprolol succinate (TOPROL-XL) 100 MG 24 hr tablet Take 1 tablet (100 mg total) by mouth once daily. 90 tablet 1    pen needle, diabetic (BD ULTRA-FINE SHORT PEN NEEDLE) 31 gauge x 5/16" Ndle 1 pen by Misc.(Non-Drug; Combo Route) route 4 (four) times daily. 300 each 5    tiotropium (SPIRIVA) 18 mcg inhalation capsule Inhale 1 capsule (18 mcg total) into the lungs once daily. Controller 90 capsule 3     No current facility-administered medications for this visit.       Review of Systems   Constitutional:  Negative for chills, fatigue, fever and unexpected weight change.   HENT:  Negative for hearing loss and trouble swallowing.    Eyes:  Negative for photophobia and visual disturbance.   Respiratory:  Negative for cough, shortness of breath and wheezing.    Cardiovascular:  Positive for leg swelling. Negative for chest pain and palpitations.   Gastrointestinal:  Negative for abdominal pain and nausea.   Genitourinary:  Negative for dysuria and frequency.   Musculoskeletal:  Negative for arthralgias, back pain, gait problem, joint swelling and myalgias. "   Skin:  Positive for wound. Negative for rash.   Neurological:  Negative for tremors, seizures, weakness, numbness and headaches.   Hematological:  Does not bruise/bleed easily.       Objective:        Physical Exam:   Foot Exam  Physical Exam  Ortho/SPM Exam     Imaging:            Assessment:       1. Venous stasis ulcer of other part of right lower leg with fat layer exposed, unspecified whether varicose veins present    2. Non-pressure chronic ulcer of right lower leg with fat layer exposed    3. Bilateral lower extremity edema    4. At high risk for inadequate nutritional intake    5. Type 2 diabetes mellitus with both eyes affected by moderate nonproliferative retinopathy and macular edema, with long-term current use of insulin    6. Venous stasis ulcer of other part of right lower leg with necrosis of muscle, unspecified whether varicose veins present    7. History of ulcer of lower extremity    8. Tinea pedis of both feet    9. Fissure in skin of both feet    10. Type 2 diabetes mellitus with diabetic nephropathy, with long-term current use of insulin    11. Type 2 diabetes mellitus with diabetic neuropathy, unspecified whether long term insulin use    12. Non-pressure chronic ulcer of right lower leg with necrosis of muscle    13. Type 2 diabetes mellitus with hyperglycemia, unspecified whether long term insulin use      Plan:   Venous stasis ulcer of other part of right lower leg with fat layer exposed, unspecified whether varicose veins present    Non-pressure chronic ulcer of right lower leg with fat layer exposed    Bilateral lower extremity edema    At high risk for inadequate nutritional intake    Type 2 diabetes mellitus with both eyes affected by moderate nonproliferative retinopathy and macular edema, with long-term current use of insulin    Venous stasis ulcer of other part of right lower leg with necrosis of muscle, unspecified whether varicose veins present    History of ulcer of lower  extremity    Tinea pedis of both feet    Fissure in skin of both feet    Type 2 diabetes mellitus with diabetic nephropathy, with long-term current use of insulin    Type 2 diabetes mellitus with diabetic neuropathy, unspecified whether long term insulin use    Non-pressure chronic ulcer of right lower leg with necrosis of muscle    Type 2 diabetes mellitus with hyperglycemia, unspecified whether long term insulin use      Follow up in about 1 week (around 5/2/2023).    Procedures          Counseling:     I provided patient education verbally regarding:   Patient diagnosis, treatment options, as well as alternatives, risks, and benefits.     This note was created using Dragon voice recognition software that occasionally misinterpreted phrases or words.

## 2023-04-26 ENCOUNTER — HOSPITAL ENCOUNTER (INPATIENT)
Facility: HOSPITAL | Age: 63
LOS: 1 days | Discharge: HOME HEALTH | DRG: 065 | End: 2023-04-28
Attending: EMERGENCY MEDICINE | Admitting: HOSPITALIST
Payer: MEDICARE

## 2023-04-26 DIAGNOSIS — I63.9 CEREBROVASCULAR ACCIDENT (CVA) OF LEFT PONTINE STRUCTURE: ICD-10-CM

## 2023-04-26 DIAGNOSIS — I63.9 STROKE: ICD-10-CM

## 2023-04-26 DIAGNOSIS — I16.1 HYPERTENSIVE EMERGENCY: Primary | ICD-10-CM

## 2023-04-26 DIAGNOSIS — H53.9 VISUAL DISTURBANCE: ICD-10-CM

## 2023-04-26 DIAGNOSIS — I16.9 HYPERTENSIVE CRISIS: ICD-10-CM

## 2023-04-26 LAB
ALBUMIN SERPL BCP-MCNC: 3.6 G/DL (ref 3.5–5.2)
ALP SERPL-CCNC: 81 U/L (ref 55–135)
ALT SERPL W/O P-5'-P-CCNC: 19 U/L (ref 10–44)
ANION GAP SERPL CALC-SCNC: 8 MMOL/L (ref 8–16)
AST SERPL-CCNC: 19 U/L (ref 10–40)
BASOPHILS # BLD AUTO: 0.06 K/UL (ref 0–0.2)
BASOPHILS NFR BLD: 0.7 % (ref 0–1.9)
BILIRUB SERPL-MCNC: 0.5 MG/DL (ref 0.1–1)
BNP SERPL-MCNC: 25 PG/ML (ref 0–99)
BUN SERPL-MCNC: 19 MG/DL (ref 8–23)
CALCIUM SERPL-MCNC: 9.1 MG/DL (ref 8.7–10.5)
CHLORIDE SERPL-SCNC: 105 MMOL/L (ref 95–110)
CO2 SERPL-SCNC: 26 MMOL/L (ref 23–29)
CREAT SERPL-MCNC: 1.1 MG/DL (ref 0.5–1.4)
DIFFERENTIAL METHOD: ABNORMAL
EOSINOPHIL # BLD AUTO: 0.2 K/UL (ref 0–0.5)
EOSINOPHIL NFR BLD: 2 % (ref 0–8)
ERYTHROCYTE [DISTWIDTH] IN BLOOD BY AUTOMATED COUNT: 14.8 % (ref 11.5–14.5)
EST. GFR  (NO RACE VARIABLE): 55.8 ML/MIN/1.73 M^2
GLUCOSE SERPL-MCNC: 203 MG/DL (ref 70–110)
HCT VFR BLD AUTO: 35.1 % (ref 37–48.5)
HGB BLD-MCNC: 12.4 G/DL (ref 12–16)
IMM GRANULOCYTES # BLD AUTO: 0.05 K/UL (ref 0–0.04)
IMM GRANULOCYTES NFR BLD AUTO: 0.5 % (ref 0–0.5)
LYMPHOCYTES # BLD AUTO: 3.2 K/UL (ref 1–4.8)
LYMPHOCYTES NFR BLD: 34.8 % (ref 18–48)
MAGNESIUM SERPL-MCNC: 1.8 MG/DL (ref 1.6–2.6)
MCH RBC QN AUTO: 28.1 PG (ref 27–31)
MCHC RBC AUTO-ENTMCNC: 35.3 G/DL (ref 32–36)
MCV RBC AUTO: 79 FL (ref 82–98)
MONOCYTES # BLD AUTO: 1 K/UL (ref 0.3–1)
MONOCYTES NFR BLD: 10.6 % (ref 4–15)
NEUTROPHILS # BLD AUTO: 4.7 K/UL (ref 1.8–7.7)
NEUTROPHILS NFR BLD: 51.4 % (ref 38–73)
NRBC BLD-RTO: 0 /100 WBC
PLATELET # BLD AUTO: 373 K/UL (ref 150–450)
PMV BLD AUTO: 11.1 FL (ref 9.2–12.9)
POTASSIUM SERPL-SCNC: 4 MMOL/L (ref 3.5–5.1)
PROT SERPL-MCNC: 7.3 G/DL (ref 6–8.4)
RBC # BLD AUTO: 4.42 M/UL (ref 4–5.4)
SODIUM SERPL-SCNC: 139 MMOL/L (ref 136–145)
TROPONIN I SERPL HS-MCNC: 4 PG/ML (ref 0–14.9)
WBC # BLD AUTO: 9.16 K/UL (ref 3.9–12.7)

## 2023-04-26 PROCEDURE — 93010 EKG 12-LEAD: ICD-10-PCS | Mod: ,,, | Performed by: INTERNAL MEDICINE

## 2023-04-26 PROCEDURE — 85025 COMPLETE CBC W/AUTO DIFF WBC: CPT | Performed by: EMERGENCY MEDICINE

## 2023-04-26 PROCEDURE — 84484 ASSAY OF TROPONIN QUANT: CPT | Performed by: EMERGENCY MEDICINE

## 2023-04-26 PROCEDURE — 63600175 PHARM REV CODE 636 W HCPCS: Performed by: EMERGENCY MEDICINE

## 2023-04-26 PROCEDURE — 93005 ELECTROCARDIOGRAM TRACING: CPT | Performed by: INTERNAL MEDICINE

## 2023-04-26 PROCEDURE — 80053 COMPREHEN METABOLIC PANEL: CPT | Performed by: EMERGENCY MEDICINE

## 2023-04-26 PROCEDURE — 93010 ELECTROCARDIOGRAM REPORT: CPT | Mod: ,,, | Performed by: INTERNAL MEDICINE

## 2023-04-26 PROCEDURE — 83880 ASSAY OF NATRIURETIC PEPTIDE: CPT | Performed by: EMERGENCY MEDICINE

## 2023-04-26 PROCEDURE — 83735 ASSAY OF MAGNESIUM: CPT | Performed by: EMERGENCY MEDICINE

## 2023-04-26 PROCEDURE — 96375 TX/PRO/DX INJ NEW DRUG ADDON: CPT

## 2023-04-26 PROCEDURE — 25000003 PHARM REV CODE 250: Performed by: EMERGENCY MEDICINE

## 2023-04-26 PROCEDURE — 99291 CRITICAL CARE FIRST HOUR: CPT

## 2023-04-26 RX ORDER — CLONIDINE 0.1 MG/24H
1 PATCH, EXTENDED RELEASE TRANSDERMAL
Status: DISCONTINUED | OUTPATIENT
Start: 2023-04-27 | End: 2023-04-26

## 2023-04-26 RX ORDER — METOPROLOL SUCCINATE 25 MG/1
100 TABLET, EXTENDED RELEASE ORAL
Status: COMPLETED | OUTPATIENT
Start: 2023-04-26 | End: 2023-04-26

## 2023-04-26 RX ORDER — CLONIDINE 0.1 MG/24H
1 PATCH, EXTENDED RELEASE TRANSDERMAL
Status: DISCONTINUED | OUTPATIENT
Start: 2023-04-26 | End: 2023-04-28 | Stop reason: HOSPADM

## 2023-04-26 RX ORDER — HYDRALAZINE HYDROCHLORIDE 20 MG/ML
10 INJECTION INTRAMUSCULAR; INTRAVENOUS
Status: COMPLETED | OUTPATIENT
Start: 2023-04-26 | End: 2023-04-26

## 2023-04-26 RX ADMIN — HYDRALAZINE HYDROCHLORIDE 10 MG: 20 INJECTION INTRAMUSCULAR; INTRAVENOUS at 10:04

## 2023-04-26 RX ADMIN — METOPROLOL SUCCINATE 100 MG: 25 TABLET, FILM COATED, EXTENDED RELEASE ORAL at 10:04

## 2023-04-26 NOTE — Clinical Note
Diagnosis: Hypertensive emergency [119279]   Admitting Provider:: JOSÉ MIGUEL BARRERA [[id number]]   Future Attending Provider: JOSÉ MIGUEL BARRERA [[id number]]   Reason for IP Medical Treatment  (Clinical interventions that can only be accomplished in the IP setting? ) :: drip   I certify that Inpatient services for greater than or equal to 2 midnights are medically necessary:: Yes   Plans for Post-Acute care--if anticipated (pick the single best option):: A. No post acute care anticipated at this time

## 2023-04-27 PROBLEM — I16.9 HYPERTENSIVE CRISIS: Status: ACTIVE | Noted: 2023-04-27

## 2023-04-27 LAB
ANION GAP SERPL CALC-SCNC: 10 MMOL/L (ref 8–16)
BUN SERPL-MCNC: 26 MG/DL (ref 8–23)
CALCIUM SERPL-MCNC: 9.1 MG/DL (ref 8.7–10.5)
CHLORIDE SERPL-SCNC: 104 MMOL/L (ref 95–110)
CO2 SERPL-SCNC: 23 MMOL/L (ref 23–29)
CREAT SERPL-MCNC: 1.2 MG/DL (ref 0.5–1.4)
EST. GFR  (NO RACE VARIABLE): 50.2 ML/MIN/1.73 M^2
GLUCOSE SERPL-MCNC: 225 MG/DL (ref 70–110)
GLUCOSE SERPL-MCNC: 235 MG/DL (ref 70–110)
GLUCOSE SERPL-MCNC: 250 MG/DL (ref 70–110)
GLUCOSE SERPL-MCNC: 256 MG/DL (ref 70–110)
GLUCOSE SERPL-MCNC: 276 MG/DL (ref 70–110)
GLUCOSE SERPL-MCNC: 320 MG/DL (ref 70–110)
POTASSIUM SERPL-SCNC: 3.8 MMOL/L (ref 3.5–5.1)
SODIUM SERPL-SCNC: 137 MMOL/L (ref 136–145)
TROPONIN I SERPL HS-MCNC: 4.4 PG/ML (ref 0–14.9)

## 2023-04-27 PROCEDURE — 25000003 PHARM REV CODE 250: Performed by: INTERNAL MEDICINE

## 2023-04-27 PROCEDURE — 25500020 PHARM REV CODE 255: Performed by: INTERNAL MEDICINE

## 2023-04-27 PROCEDURE — 63600175 PHARM REV CODE 636 W HCPCS: Performed by: INTERNAL MEDICINE

## 2023-04-27 PROCEDURE — 82962 GLUCOSE BLOOD TEST: CPT

## 2023-04-27 PROCEDURE — A9585 GADOBUTROL INJECTION: HCPCS | Performed by: INTERNAL MEDICINE

## 2023-04-27 PROCEDURE — 21400001 HC TELEMETRY ROOM

## 2023-04-27 PROCEDURE — 25000003 PHARM REV CODE 250: Performed by: HOSPITALIST

## 2023-04-27 PROCEDURE — 25000003 PHARM REV CODE 250: Performed by: EMERGENCY MEDICINE

## 2023-04-27 PROCEDURE — 96366 THER/PROPH/DIAG IV INF ADDON: CPT

## 2023-04-27 PROCEDURE — 96365 THER/PROPH/DIAG IV INF INIT: CPT

## 2023-04-27 PROCEDURE — 84484 ASSAY OF TROPONIN QUANT: CPT | Performed by: EMERGENCY MEDICINE

## 2023-04-27 PROCEDURE — 80048 BASIC METABOLIC PNL TOTAL CA: CPT | Performed by: HOSPITALIST

## 2023-04-27 PROCEDURE — 25500020 PHARM REV CODE 255: Performed by: HOSPITALIST

## 2023-04-27 PROCEDURE — 63600175 PHARM REV CODE 636 W HCPCS: Performed by: HOSPITALIST

## 2023-04-27 RX ORDER — BISACODYL 10 MG
10 SUPPOSITORY, RECTAL RECTAL DAILY PRN
Status: DISCONTINUED | OUTPATIENT
Start: 2023-04-27 | End: 2023-04-28 | Stop reason: HOSPADM

## 2023-04-27 RX ORDER — ACETAMINOPHEN 325 MG/1
650 TABLET ORAL EVERY 8 HOURS PRN
Status: DISCONTINUED | OUTPATIENT
Start: 2023-04-27 | End: 2023-04-28 | Stop reason: HOSPADM

## 2023-04-27 RX ORDER — HYDRALAZINE HYDROCHLORIDE 20 MG/ML
10 INJECTION INTRAMUSCULAR; INTRAVENOUS EVERY 6 HOURS PRN
Status: DISCONTINUED | OUTPATIENT
Start: 2023-04-27 | End: 2023-04-28 | Stop reason: HOSPADM

## 2023-04-27 RX ORDER — CLOPIDOGREL BISULFATE 75 MG/1
75 TABLET ORAL DAILY
Status: DISCONTINUED | OUTPATIENT
Start: 2023-04-27 | End: 2023-04-28 | Stop reason: HOSPADM

## 2023-04-27 RX ORDER — DEXAMETHASONE 0.7 MG/1
0.7 IMPLANT INTRAVITREAL ONCE
COMMUNITY
Start: 2023-03-08 | End: 2023-05-02

## 2023-04-27 RX ORDER — IBUPROFEN 200 MG
24 TABLET ORAL
Status: DISCONTINUED | OUTPATIENT
Start: 2023-04-27 | End: 2023-04-28 | Stop reason: HOSPADM

## 2023-04-27 RX ORDER — HYDRALAZINE HYDROCHLORIDE 20 MG/ML
10 INJECTION INTRAMUSCULAR; INTRAVENOUS EVERY 6 HOURS PRN
Status: DISCONTINUED | OUTPATIENT
Start: 2023-04-27 | End: 2023-04-27

## 2023-04-27 RX ORDER — BRIMONIDINE TARTRATE 1.5 MG/ML
1 SOLUTION/ DROPS OPHTHALMIC EVERY 12 HOURS
Status: DISCONTINUED | OUTPATIENT
Start: 2023-04-27 | End: 2023-04-28 | Stop reason: HOSPADM

## 2023-04-27 RX ORDER — IBUPROFEN 200 MG
16 TABLET ORAL
Status: DISCONTINUED | OUTPATIENT
Start: 2023-04-27 | End: 2023-04-28 | Stop reason: HOSPADM

## 2023-04-27 RX ORDER — NICARDIPINE HYDROCHLORIDE 0.2 MG/ML
0-15 INJECTION INTRAVENOUS CONTINUOUS
Status: DISCONTINUED | OUTPATIENT
Start: 2023-04-27 | End: 2023-04-27

## 2023-04-27 RX ORDER — INSULIN ASPART 100 [IU]/ML
0-5 INJECTION, SOLUTION INTRAVENOUS; SUBCUTANEOUS
Status: DISCONTINUED | OUTPATIENT
Start: 2023-04-27 | End: 2023-04-28 | Stop reason: HOSPADM

## 2023-04-27 RX ORDER — AMLODIPINE BESYLATE 5 MG/1
10 TABLET ORAL DAILY
Status: DISCONTINUED | OUTPATIENT
Start: 2023-04-27 | End: 2023-04-28 | Stop reason: HOSPADM

## 2023-04-27 RX ORDER — TALC
6 POWDER (GRAM) TOPICAL NIGHTLY PRN
Status: DISCONTINUED | OUTPATIENT
Start: 2023-04-27 | End: 2023-04-28 | Stop reason: HOSPADM

## 2023-04-27 RX ORDER — ATORVASTATIN CALCIUM 40 MG/1
40 TABLET, FILM COATED ORAL DAILY
Status: DISCONTINUED | OUTPATIENT
Start: 2023-04-27 | End: 2023-04-28 | Stop reason: HOSPADM

## 2023-04-27 RX ORDER — SODIUM CHLORIDE 0.9 % (FLUSH) 0.9 %
10 SYRINGE (ML) INJECTION
Status: DISCONTINUED | OUTPATIENT
Start: 2023-04-27 | End: 2023-04-28 | Stop reason: HOSPADM

## 2023-04-27 RX ORDER — LOSARTAN POTASSIUM 50 MG/1
50 TABLET ORAL DAILY
Status: DISCONTINUED | OUTPATIENT
Start: 2023-04-27 | End: 2023-04-28

## 2023-04-27 RX ORDER — GADOBUTROL 604.72 MG/ML
6 INJECTION INTRAVENOUS
Status: COMPLETED | OUTPATIENT
Start: 2023-04-27 | End: 2023-04-27

## 2023-04-27 RX ORDER — GLUCAGON 1 MG
1 KIT INJECTION
Status: DISCONTINUED | OUTPATIENT
Start: 2023-04-27 | End: 2023-04-28 | Stop reason: HOSPADM

## 2023-04-27 RX ORDER — ONDANSETRON 2 MG/ML
4 INJECTION INTRAMUSCULAR; INTRAVENOUS EVERY 8 HOURS PRN
Status: DISCONTINUED | OUTPATIENT
Start: 2023-04-27 | End: 2023-04-28 | Stop reason: HOSPADM

## 2023-04-27 RX ORDER — NAPROXEN SODIUM 220 MG/1
81 TABLET, FILM COATED ORAL DAILY
Status: DISCONTINUED | OUTPATIENT
Start: 2023-04-27 | End: 2023-04-28 | Stop reason: HOSPADM

## 2023-04-27 RX ORDER — ENOXAPARIN SODIUM 100 MG/ML
40 INJECTION SUBCUTANEOUS EVERY 24 HOURS
Status: DISCONTINUED | OUTPATIENT
Start: 2023-04-27 | End: 2023-04-28 | Stop reason: HOSPADM

## 2023-04-27 RX ADMIN — INSULIN ASPART 2 UNITS: 100 INJECTION, SOLUTION INTRAVENOUS; SUBCUTANEOUS at 04:04

## 2023-04-27 RX ADMIN — ASPIRIN 81 MG CHEWABLE TABLET 81 MG: 81 TABLET CHEWABLE at 08:04

## 2023-04-27 RX ADMIN — CLONIDINE 1 PATCH: 0.1 PATCH TRANSDERMAL at 12:04

## 2023-04-27 RX ADMIN — AMLODIPINE BESYLATE 10 MG: 5 TABLET ORAL at 05:04

## 2023-04-27 RX ADMIN — INSULIN ASPART 2 UNITS: 100 INJECTION, SOLUTION INTRAVENOUS; SUBCUTANEOUS at 09:04

## 2023-04-27 RX ADMIN — INSULIN DETEMIR 5 UNITS: 100 INJECTION, SOLUTION SUBCUTANEOUS at 10:04

## 2023-04-27 RX ADMIN — GADOBUTROL 6 ML: 604.72 INJECTION INTRAVENOUS at 10:04

## 2023-04-27 RX ADMIN — ENOXAPARIN SODIUM 40 MG: 100 INJECTION SUBCUTANEOUS at 05:04

## 2023-04-27 RX ADMIN — HYDRALAZINE HYDROCHLORIDE 10 MG: 20 INJECTION INTRAMUSCULAR; INTRAVENOUS at 12:04

## 2023-04-27 RX ADMIN — ATORVASTATIN CALCIUM 40 MG: 40 TABLET, FILM COATED ORAL at 08:04

## 2023-04-27 RX ADMIN — INSULIN ASPART 3 UNITS: 100 INJECTION, SOLUTION INTRAVENOUS; SUBCUTANEOUS at 12:04

## 2023-04-27 RX ADMIN — BRIMONIDINE TARTRATE 1 DROP: 1.5 SOLUTION/ DROPS OPHTHALMIC at 09:04

## 2023-04-27 RX ADMIN — LOSARTAN POTASSIUM 50 MG: 50 TABLET, FILM COATED ORAL at 08:04

## 2023-04-27 RX ADMIN — NICARDIPINE HYDROCHLORIDE 5 MG/HR: 0.2 INJECTION, SOLUTION INTRAVENOUS at 02:04

## 2023-04-27 RX ADMIN — NICARDIPINE HYDROCHLORIDE 2.5 MG/HR: 0.2 INJECTION, SOLUTION INTRAVENOUS at 03:04

## 2023-04-27 RX ADMIN — BRIMONIDINE TARTRATE 1 DROP: 1.5 SOLUTION/ DROPS OPHTHALMIC at 08:04

## 2023-04-27 RX ADMIN — INSULIN ASPART 3 UNITS: 100 INJECTION, SOLUTION INTRAVENOUS; SUBCUTANEOUS at 08:04

## 2023-04-27 RX ADMIN — IOHEXOL 100 ML: 350 INJECTION, SOLUTION INTRAVENOUS at 05:04

## 2023-04-27 RX ADMIN — CLOPIDOGREL BISULFATE 75 MG: 75 TABLET, FILM COATED ORAL at 08:04

## 2023-04-27 NOTE — ASSESSMENT & PLAN NOTE
Blood pressure uncontrolled at 236 systolic with bilateral blurred vision, required IV hydralazine and clonidine and is now on nicardipine drip.  Related to medication noncompliance and likely worsened by rebound hypertension from clonidine.  Has history of CVA.  CT head without hemorrhage.  Admit to step-down unit.  Monitor telemetry.  Check MRI brain with and without contrast to rule out CVA given her vision changes.  Wean nicardipine drip.  Resume home medications with clonidine patch, losartan, and metoprolol succinate, but will hold hydralazine as 3 times daily dosing is not feasible.  I counseled her extensively on compliance

## 2023-04-27 NOTE — HPI
63-year-old woman with hypertension, diabetes, hyperlipidemia, history of CVA, noncompliance, presenting with bilateral vision changes.  Patient reports that 10:00 p.m. on Tuesday 04/25/2023 she noticed blurriness in her vision in both eyes.  It remained constant over the next 24 hours until she presented to the ED. in the ED, her blood pressure was elevated at 236 systolic.  She is noncompliant with medications, she did not take blood pressure medications today and does not take them on a regular basis.  She is supposed to use a clonidine patch but she took it off yesterday and did not replace it.  She states her blood pressure is always high.  She is otherwise asymptomatic, no headache, chest pain, shortness at breath, palpitations, dizziness, syncope, numbness or tingling.  Denies alcohol, tobacco, or drug use.  In the ED, she was given antihypertensives but her blood pressure remained in the 200 systolic so she was started on a nicardipine drip.  Her blood pressure is improving and she states her vision is still poor.

## 2023-04-27 NOTE — PHARMACY MED REC
"    Admission Medication History     The home medication history was taken by Eboni Vera.    You may go to "Admission" then "Reconcile Home Medications" tabs to review and/or act upon these items.     The home medication list has been updated by the Pharmacy department.   Please read ALL comments highlighted in yellow.   Please address this information as you see fit.    Feel free to contact us if you have any questions or require assistance.        Medications listed below were obtained from: Patient/family  Prior to Admission medications    Medication Sig Start Date End Date Taking? Authorizing Provider   atorvastatin (LIPITOR) 40 MG tablet Take 1 tablet (40 mg total) by mouth once daily. 2/16/23 2/16/24 Yes Cristina Ren MD   brimonidine 0.2% (ALPHAGAN) 0.2 % Drop Place 1 drop into both eyes every 12 (twelve) hours. 2/24/23  Yes Heraclio Ambrose MD   cloNIDine 0.1 mg/24 hr td ptwk (CATAPRES) 0.1 mg/24 hr Place 1 patch onto the skin every 7 days.  Patient taking differently: Place 1 patch onto the skin every 7 days. WEDNESDAYS 2/16/23 2/16/24 Yes Cristina Ren MD   hydrALAZINE (APRESOLINE) 100 MG tablet Take 1 tablet (100 mg total) by mouth 3 (three) times daily. 2/16/23 2/16/24 Yes Cristina Ren MD   insulin aspart U-100 (NOVOLOG FLEXPEN U-100 INSULIN) 100 unit/mL (3 mL) InPn pen Inject 14 Units subcutaneously into the skin 3 (three) times daily before meals. 2/16/23  Yes Cristina Ren MD   insulin degludec (TRESIBA FLEXTOUCH U-100) 100 unit/mL (3 mL) insulin pen ADMINISTER 40 UNITS UNDER THE SKIN EVERY EVENING  Patient taking differently: Inject 40 Units into the skin every evening. ADMINISTER 40 UNITS UNDER THE SKIN EVERY EVENING 2/16/23  Yes Cristina Ren MD   losartan (COZAAR) 50 MG tablet Take 1 tablet (50 mg total) by mouth once daily. 2/16/23  Yes Cristina Ren MD   blood glucose control, low Soln Use as directed  Patient taking differently: 1 each by Misc.(Non-Drug; Combo Route) route. " "Use as directed 10/3/21   Cristina Ren MD   blood sugar diagnostic (TRUE METRIX GLUCOSE TEST STRIP) Strp Test blood sugar twice daily 2/16/23   Cristina Ren MD           HYDROcodone-acetaminophen (NORCO)  mg per tablet  6/8/22   Historical Provider   lancets Misc 1 lancet by Misc.(Non-Drug; Combo Route) route 2 (two) times a day. To check BG 2 times daily, to use with insurance preferred meter 2/16/23   Cristina Ren MD           OZURDEX 0.7 mg Impl intravitreal implant 0.7 mg by Intravitreal route once. 3/8/23   Historical Provider   pen needle, diabetic (BD ULTRA-FINE SHORT PEN NEEDLE) 31 gauge x 5/16" Ndle 1 pen by Misc.(Non-Drug; Combo Route) route 4 (four) times daily. 2/16/23   Cristina Ren MD           aspirin 81 MG Chew Take 1 tablet (81 mg total) by mouth once daily. 6/2/21 5/20/22  Cassidy Shaw, BRY, NP   blood-glucose meter (TRUE METRIX GLUCOSE METER) kit Use as instructed 10/20/21 5/18/22  Gabriel Moran, NP   chlorthalidone (HYGROTEN) 50 MG Tab Take 1 tablet (50 mg total) by mouth once daily. 2/1/22 5/20/22  Cristina Ren MD   ergocalciferol (ERGOCALCIFEROL) 50,000 unit Cap Take 1 capsule (50,000 Units total) by mouth every 7 days. 2/16/23 4/27/23  Cristina Ren MD   lancing device (TRUEDRAW LANCING DEVICE) Misc Inject 1 Device into the skin 2 (two) times daily. 3/31/22 5/18/22  Gabriel Moran NP       Potential issues to be addressed PRIOR TO DISCHARGE  Patient reported not taking the following medications: (Metoprolol, Spiriva Inhaler & Plavix). These medications remain on the home medication list. Please address accordingly.     Eboni Vera  EXT [id number]             .        "

## 2023-04-27 NOTE — PROGRESS NOTES
Reviewed the H&P  Evaluated the patient  Stopped the GTT  Added norvasc 10mg daily  Neurology consulted for further workup  Anticipate discharge in 24 hours
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
## 2023-04-27 NOTE — ED PROVIDER NOTES
Encounter Date: 4/26/2023       History     Chief Complaint   Patient presents with    Loss of Vision     C/o blurred vision x this morning approx 6am.      Emergent evaluation of a 63-year-old female with history of uncontrolled hypertension, prior CVA of the left pontine in December 2019, diabetes, COPD presents to the ER for evaluation of blurred vision that began around 10:00 p.m. yesterday.  Patient reports blurry vision in both eyes.  She reports she is been wearing her glasses and last had a prescription change 1 year ago.  She reports it is equal blurry vision.  No diplopia or loss of vision.  She denies any speech changes her significant other reports no slurring or confusion, no facial droop.  She denies headache dizziness lightheadedness weakness numbness or tingling and no ataxia.  Significant other gives her her home medications and monitors her blood pressure he reports her blood pressures always over 200/100 and is very poorly controlled.  He reports that this is been ongoing since 2019.  Her primary is Dr. Ren.  He reports that she is multiple blood pressure medications including metoprolol 100 mg daily, hydralazine 100 mg t.i.d., clonidine 0.1 mg per day as a patch but speech placed every 7 days.  He reports she was not given any of her medications today.  And that she sometimes misses her medicines.  Reports that it does not mother wonders there she takes her medicines are not her blood pressure is high and he is concerned that with all the medication she is supposed to take it could be injuring her kidneys.  He says he sometimes gives her garlic to see if this will lower her blood pressure.    Review of patient's allergies indicates:  No Known Allergies  Past Medical History:   Diagnosis Date    Arthritis     Complete tear of left rotator cuff 11/09/2017    COPD (chronic obstructive pulmonary disease)     COVID-19 infection 07/02/2021    Diabetes mellitus     H/o Cerebrovascular accident (CVA)  of left pontine structure 12/12/2019    Hypertension     Personal history of colonic polyps     Stroke     Wears glasses      Past Surgical History:   Procedure Laterality Date    COLONOSCOPY N/A 4/11/2017    Procedure: COLONOSCOPY;  Surgeon: Jared Antonio MD;  Location: [facility];  Service: Endoscopy;  Laterality: N/A;    HYSTERECTOMY      OOPHORECTOMY      SHOULDER SURGERY      R     Family History   Problem Relation Age of Onset    Diabetes Mother     Asthma Mother     Hypertension Mother     Diabetes Father     Diabetes Brother     Hypertension Brother     Anemia Daughter     Glaucoma Neg Hx     Macular degeneration Neg Hx     Retinal detachment Neg Hx      Social History     Tobacco Use    Smoking status: Never    Smokeless tobacco: Never   Substance Use Topics    Alcohol use: No    Drug use: No     Review of Systems   Constitutional:  Negative for activity change, appetite change, chills, diaphoresis, fatigue and fever.   HENT:  Negative for congestion, postnasal drip, rhinorrhea and sore throat.    Eyes:  Positive for pain and visual disturbance.   Respiratory:  Negative for cough, chest tightness, shortness of breath, wheezing and stridor.    Cardiovascular:  Negative for chest pain and palpitations.   Gastrointestinal:  Negative for abdominal distention, abdominal pain, blood in stool, constipation, diarrhea, nausea and vomiting.   Genitourinary:  Negative for flank pain.   Musculoskeletal:  Negative for arthralgias, back pain, myalgias, neck pain and neck stiffness.   Skin:  Negative for rash.   Neurological:  Negative for dizziness, seizures, syncope, speech difficulty, weakness, light-headedness, numbness and headaches.   Hematological:  Does not bruise/bleed easily.   Psychiatric/Behavioral:  Negative for agitation, behavioral problems and confusion. The patient is not nervous/anxious.    All other systems reviewed and are negative.    Physical Exam     Initial Vitals [04/26/23 2122]   BP Pulse Resp  Temp SpO2   (!) 238/123 75 18 98.5 °F (36.9 °C) 97 %      MAP       --         Physical Exam    Nursing note and vitals reviewed.  Constitutional: She appears well-developed and well-nourished. She is not diaphoretic. No distress.   HENT:   Head: Normocephalic and atraumatic.   Right Ear: External ear normal.   Left Ear: External ear normal.   Nose: Nose normal.   Mouth/Throat: Oropharynx is clear and moist.   Eyes: Conjunctivae and EOM are normal. Pupils are equal, round, and reactive to light.   Visual acuity bilaterally is 20/200  L 20/100  R 20/100  No scleral injection, normal extraocular movements.  Pupils equal round and briskly reactive  No conjunctivitis no proptosis no periorbital erythema or injection   Neck: Neck supple. No tracheal deviation present.   Normal range of motion.  Cardiovascular:  Normal rate, regular rhythm, normal heart sounds and intact distal pulses.     Exam reveals no gallop and no friction rub.       No murmur heard.  Blood pressure is 236/112 pulse of 77   Pulmonary/Chest: Breath sounds normal. No stridor. No respiratory distress. She has no wheezes. She has no rhonchi. She has no rales. She exhibits no tenderness.   Abdominal: Abdomen is soft. Bowel sounds are normal. She exhibits no distension and no mass. There is no abdominal tenderness. There is no rebound and no guarding.   Musculoskeletal:         General: No edema. Normal range of motion.      Cervical back: Normal range of motion and neck supple.     Neurological: She is alert and oriented to person, place, and time. She has normal strength. No cranial nerve deficit or sensory deficit. GCS score is 15. GCS eye subscore is 4. GCS verbal subscore is 5. GCS motor subscore is 6.   Pt is neurovascularly intact with GCS: 15,  CN 2-12 grossly intact. No facial asymmetry. Normal speech without slurring.  5/5 strength in bilateral lower extremities including hip flexion,  dorsal and plantar flexion, EHL and FLH, and bilateral upper  extremities including biceps, triceps, wrist flexion and extension, and RUM nerves.  Normal sensation to light touch in all 4 extremities. No pronator drift.  2+ DTR's. Normal Gait. Normal cerebellar signs including finger nose testing, heel shin, and dysdiadokinesis.      Skin: Skin is warm and dry. No rash noted. No erythema. No pallor.   Psychiatric: She has a normal mood and affect. Her behavior is normal. Judgment and thought content normal.       ED Course   Procedures  Labs Reviewed   CBC W/ AUTO DIFFERENTIAL - Abnormal; Notable for the following components:       Result Value    Hematocrit 35.1 (*)     MCV 79 (*)     RDW 14.8 (*)     Immature Grans (Abs) 0.05 (*)     All other components within normal limits   COMPREHENSIVE METABOLIC PANEL - Abnormal; Notable for the following components:    Glucose 203 (*)     eGFR 55.8 (*)     All other components within normal limits   MAGNESIUM   TROPONIN I HIGH SENSITIVITY   B-TYPE NATRIURETIC PEPTIDE   TROPONIN I HIGH SENSITIVITY   POCT GLUCOSE MONITORING CONTINUOUS     EKG Readings: (Independently Interpreted)   Initial Reading: No STEMI. Rhythm: Normal Sinus Rhythm. Heart Rate: 74. Ectopy: No Ectopy. Conduction: Normal.   Inverted T-waves V5 V6     Imaging Results              CT Head Without Contrast (Final result)  Result time 04/26/23 22:22:27      Final result by Nicanor Del Valle MD (04/26/23 22:22:27)                   Narrative:      HISTORY:  severe htn blurred vision    TECHNIQUE:  Serial axial noncontrast images of the brain were obtained with sagittal and coronal reconstructions.    All CT scans at this facility use dose modulation, iterative reconstruction, and/or weightbase dosing when appropriate to reduce radiation dose to as low as reasonably achievable.    COMPARISON:  MRI of the brain dated May 19, 2022.    FINDINGS:  There is no evidence of acute intracranial hemorrhage or definite cortical infarction.    Mild diffuse parenchymal volume loss is  noted.No hydrocephalus.  No midline shift.  No extra-axial fluid collection.    Nonspecific periventricular and subcortical hypodensities again noted similar to prior exam.    The proximal M1 segments of the MCA's show no definite asymmetric hyperdensity.    Basilar cisterns are patent.    No skull fracture.    The visualized paranasal sinuses and mastoid air cells are clear.    IMPRESSION:    No acute intracranial finding or significant interval change.    Electronically signed by:  Nicanor Del Valle MD  4/26/2023 10:22 PM CDT Workstation: [id number]                                     X-Ray Chest AP Portable (In process)                   X-Rays:   Independently Interpreted Readings:   Chest X-Ray: Normal heart size.  No infiltrates.  No acute abnormalities.   Medications   cloNIDine 0.1 mg/24 hr td ptwk 1 patch (1 patch Transdermal Patch Applied 4/27/23 0011)   niCARdipine 40 mg/200 mL (0.2 mg/mL) infusion (5 mg/hr Intravenous New Bag 4/27/23 0250)   hydrALAZINE injection 10 mg (10 mg Intravenous Given 4/26/23 2247)   metoprolol succinate (TOPROL-XL) 24 hr tablet 100 mg (100 mg Oral Given 4/26/23 2247)     Medical Decision Making:   History:   I obtained history from: someone other than patient.       <> Summary of History: Significant other  Independently Interpreted Test(s):   I have ordered and independently interpreted X-rays - see prior notes.  I have ordered and independently interpreted EKG Reading(s) - see prior notes  Clinical Tests:   Lab Tests: Reviewed and Ordered  The following lab test(s) were unremarkable: BNP and CBC       <> Summary of Lab: Magnesium 1.8  Glucose 203  Troponin at 9:49 p.m. is 4  Radiological Study: Reviewed and Ordered  Medical Tests: Reviewed and Ordered  ED Management:  Emergent evaluation of a 63-year-old female with history of uncontrolled hypertension, prior CVA of the left pontine in December 2019, diabetes, COPD presents to the ER for evaluation of blurred vision that began  around 10:00 p.m. yesterday.  Patient reports blurry vision in both eyes.  She reports she is been wearing her glasses and last had a prescription change 1 year ago.  She reports it is equal blurry vision.  No diplopia or loss of vision.  She denies any speech changes her significant other reports no slurring or confusion, no facial droop.  She denies headache dizziness lightheadedness weakness numbness or tingling and no ataxia.  Significant other gives her her home medications and monitors her blood pressure he reports her blood pressures always over 200/100 and is very poorly controlled.  He reports that this is been ongoing since 2019.  Her primary is Dr. Ren.  He reports that she is multiple blood pressure medications including metoprolol 100 mg daily, hydralazine 100 mg t.i.d., clonidine 0.1 mg per day as a patch but speech placed every 7 days.  He reports she was not given any of her medications today.  And that she sometimes misses her medicines.  Reports that it does not mother wonders there she takes her medicines are not her blood pressure is high and he is concerned that with all the medication she is supposed to take it could be injuring her kidneys.  He says he sometimes gives her garlic to see if this will lower her blood pressure.  On physical exam blood pressure 236/112 pulse 67 temp 98.5° sats 97% on room air respirations 16 patient is awake alert oriented.  She has blurry vision with a visual acuity bilaterally of 2200, in the left eye she is 2100 and in the right eye she is 2100.  She is no cranial nerve deficits, and a nonfocal neurologic exam.  Normal cardiac and lung exam soft nontender abdomen.  MDM    Patient presents for emergent evaluation of acute blurred vision since 10:00 p.m. last night, elevated blood pressure that poses a possible threat to life and/or bodily function.    Differential diagnosis includes but is not limited to CVA, ACS, and no hemorrhages, glaucoma, migraine  headache, intracranial bleeding.  In the ED patient found to have acute hypertensive emergency with acute vision changes in the past 24 hours  I ordered labs and personally reviewed them.  Labs significant for see above.    I ordered X-rays and personally reviewed them and reviewed the radiologist interpretation.  Xray significant for see above.    I ordered EKG and personally reviewed it.  EKG significant for see above.    I ordered CT scan and personally reviewed it and reviewed the radiologist interpretation.  CT significant for There is no evidence of acute intracranial hemorrhage or definite cortical infarction.     Mild diffuse parenchymal volume loss is noted.No hydrocephalus.  No midline shift.  No extra-axial fluid collection.     Nonspecific periventricular and subcortical hypodensities again noted similar to prior exam.     The proximal M1 segments of the MCA's show no definite asymmetric hyperdensity.     Basilar cisterns are patent.     No skull fracture.     The visualized paranasal sinuses and mastoid air cells are clear.     Admission MDM  I discussed the patient presentation labs, ekg, X-rays, CT findings with the Hospitalist   Patient was managed in the ED with IV hydralazine 10 mg, Toprol- mg, this is a home medication, and her 0.1 mg clonidine patch.  Will see if her blood pressure begins to improve.  Her CT of the head revealed no acute abnormality.  Patient's visual symptoms are subacute.  She was not a stroke activation  The response to treatment was fair blood pressure only improved to 215/110 with the above medications due to ongoing very elevated blood pressures in context of acute vision change in the past 24 hours she was started on a Cardene drip.  Blood pressure goals to decrease map by 25% within the 1st 24 hours with a map goal of 110.  Patient's blood pressure has improved 156/75 med is currently 108 on a drip of 5 milligrams/hour.  She reports vision remains blurry no new  changes   Patient required emergent consultation to hospitalist for admission.  Tonia Goldman M.D.     Other:   I have discussed this case with another health care provider.          Attending Attestation:         Attending Critical Care:   Critical Care Times:   Direct Patient Care (initial evaluation, reassessments, and time considering the case)................................................................10 minutes.   Additional History from reviewing old medical records or taking additional history from the family, EMS, PCP, etc.......................5 minutes.   Ordering, Reviewing, and Interpreting Diagnostic Studies...............................................................................................................5 minutes.   Documentation..................................................................................................................................................................................10 minutes.   Consultation with other Physicians. .................................................................................................................................................5 minutes.   Consultation with the patient's family directly relating to the patient's condition, care, and DNR status (when patient unable)......5 minutes.   ==============================================================  Total Critical Care Time - exclusive of procedural time: 40 minutes.  ==============================================================  Critical care was necessary to treat or prevent imminent or life-threatening deterioration of the following conditions: hypertensive urgency.   Critical care was time spent personally by me on the following activities: obtaining history from patient or relative, examination of patient, review of old charts, ordering lab, x-rays, and/or EKG, ordering and performing treatments and interventions, development of treatment plan with patient or  relative, evaluation of patient's response to treatment, discussion with consultants, interpretation of cardiac measurements and re-evaluation of patient's conition.   Critical Care Condition: life-threatening                      Clinical Impression:   Final diagnoses:  [H53.9] Visual disturbance  [I16.1] Hypertensive emergency (Primary)        ED Disposition Condition    Admit Stable                Tonia Goldman MD  04/27/23 9625

## 2023-04-27 NOTE — ED NOTES
Patient cleaned of incontinent brief. New bed linens applied. Patient not in any acute distress upon discharge
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
## 2023-04-27 NOTE — PLAN OF CARE
[facility] - Emergency Dept  Initial Discharge Assessment       Primary Care Provider: Cristina Ren MD    Admission Diagnosis: Hypertensive emergency [I16.1]    Admission Date: 4/26/2023  Expected Discharge Date: 4/28/2023    Discharge Barriers Identified: None    Initial assessment completed at bedside with patient. Patient lives with significant other, Nish. Care giver assists p[atient with transportation, meal preparation and adls. Patient anticipates discharge home with significant other when medically clear.    Payor: [employer] MEDICARE / Plan: [employer] HMO PPO SPECIAL NEEDS / Product Type: Medicare Advantage /     Extended Emergency Contact Information  Primary Emergency Contact: Nish Marti  Home Phone: [phone]  Mobile Phone: [phone]  Relation: Friend   needed? No  Secondary Emergency Contact: Gita Johnson  Address: [address] [place], [place] [address] [place] [address]  Home Phone: [phone]  Mobile Phone: [phone]  Relation: Sister  Preferred language: English   needed? No    Discharge Plan A: Home  Discharge Plan B: Home      [facility] - [place], [place] - [address]  [address] [facility] [address]  Phone: [phone] Fax: [phone]    [facility] [address]  [facility] [address]  Phone: [phone] Fax: [phone]    [id number] - [place] [address] [address]  [address] [facility]  [address] [facility] [address]  Phone: [phone] Fax: [phone]      Initial Assessment (most recent)       Adult Discharge Assessment - 04/27/23 1321          Discharge Assessment    Assessment Type Discharge Planning Assessment     Confirmed/corrected address, phone number and insurance Yes     Confirmed Demographics Correct on Facesheet     Source of Information patient     Reason For Admission Hypertension crisis      People in Home significant other;grandchild(ana)     Facility Arrived From: home     Do you expect to return to your current living situation? Yes     Do you have help at home or someone to help you manage your care at home? Yes     Who are your caregiver(s) and their phone number(s)? Nish Marti [phone]     Walking or Climbing Stairs ambulation difficulty, requires equipment   rolling walker    Mobility Management walker     Dressing/Bathing bathing difficulty, assistance 1 person   none    Dressing/Bathing Management Nish Marti [phone]     Home Layout Able to live on 1st floor     Equipment Currently Used at Home walker, rolling;bedside commode;wheelchair     Readmission within 30 days? No     Patient currently being followed by outpatient case management? No     Do you currently have service(s) that help you manage your care at home? No     Do you take prescription medications? Yes     Do you have prescription coverage? Yes     Coverage [employer]     Do you have any problems affording any of your prescribed medications? No     Is the patient taking medications as prescribed? yes     Who is going to help you get home at discharge? Nish Marti [phone]     How do you get to doctors appointments? family or friend will provide     Are you on dialysis? No     Do you take coumadin? No     Discharge Plan A Home     Discharge Plan B Home     DME Needed Upon Discharge  none     Discharge Plan discussed with: Patient     Discharge Barriers Identified None        Physical Activity    On average, how many days per week do you engage in moderate to strenuous exercise (like a brisk walk)? 0 days     On average, how many minutes do you engage in exercise at this level? 0 min        Financial Resource Strain    How hard is it for you to pay for the very basics like food, housing, medical care, and heating? Not very hard        Housing Stability    In the last 12 months, was there a time when you were not able  to pay the mortgage or rent on time? No     In the last 12 months, how many places have you lived? 1     In the last 12 months, was there a time when you did not have a steady place to sleep or slept in a shelter (including now)? No        Transportation Needs    In the past 12 months, has lack of transportation kept you from medical appointments or from getting medications? No     In the past 12 months, has lack of transportation kept you from meetings, work, or from getting things needed for daily living? No        Food Insecurity    Within the past 12 months, you worried that your food would run out before you got the money to buy more. Never true     Within the past 12 months, the food you bought just didn't last and you didn't have money to get more. Never true        Stress    Do you feel stress - tense, restless, nervous, or anxious, or unable to sleep at night because your mind is troubled all the time - these days? Not at all        Social Connections    In a typical week, how many times do you talk on the phone with family, friends, or neighbors? More than three times a week     How often do you get together with friends or relatives? More than three times a week     How often do you attend Advent or Oriental orthodox services? More than 4 times per year     Do you belong to any clubs or organizations such as Advent groups, unions, fraternal or athletic groups, or school groups? No     How often do you attend meetings of the clubs or organizations you belong to? Never     Are you , , , , never , or living with a partner? Living with partner        Alcohol Use    Q1: How often do you have a drink containing alcohol? Never     Q2: How many drinks containing alcohol do you have on a typical day when you are drinking? Patient does not drink     Q3: How often do you have six or more drinks on one occasion? Never        OTHER    Name(s) of People in Home Nish Marti [phone]

## 2023-04-27 NOTE — CONSULTS
[facility]  Neurology Consult    Patient Name: Steff Johnson   MRN: [id number]  : 1960  TODAY'S DATE: 2023  ADMIT DATE: 2023  9:16 PM                                          CONSULTED PROVIDER: Jennifer Tony MD, Neurologist. On-call Phone: [phone]  CONSULT REQUESTED BY: Juan Alberto Iqbal MD     Chief Complaint   Patient presents with    Loss of Vision     C/o blurred vision x this morning approx 6am.         HPI per EMR:  63-year-old woman with hypertension, diabetes, hyperlipidemia, history of CVA, noncompliance, presenting with bilateral vision changes.  Patient reports that 10:00 p.m. on 2023 she noticed blurriness in her vision in both eyes.  It remained constant over the next 24 hours until she presented to the ED. in the ED, her blood pressure was elevated at 236 systolic.  She is noncompliant with medications, she did not take blood pressure medications today and does not take them on a regular basis.  She is supposed to use a clonidine patch but she took it off yesterday and did not replace it.  She states her blood pressure is always high.  She is otherwise asymptomatic, no headache, chest pain, shortness at breath, palpitations, dizziness, syncope, numbness or tingling.  Denies alcohol, tobacco, or drug use.  In the ED, she was given antihypertensives but her blood pressure remained in the 200 systolic so she was started on a nicardipine drip.  Her blood pressure is improving and she states her vision is still poor.    Neurology Consult:  Patient was seen and examined by me today.  She is a 63-year-old woman with history of stroke, medication noncompliance, uncontrolled hypertension, diabetes, hyperlipidemia, who presented to the emergency room with blurred vision that started a few days prior.  She states that her vision did not improve and she also noticed her speech was slurred, so she decided to present to the emergency room for further  evaluation.  Upon arrival to the ED, her blood pressure systolic was at 236, so she was started on a Cardene drip.  MRI brain was obtained and showed punctate stroke in the right midbrain and another punctate stroke in the left cerebellum, so stroke workup was ordered.  Admitted for management of hypertensive emergency and stroke.    Review of Systems:    A comprehensive ROS was performed and all systems were negative except as noted above in HPI.    Past Medical History:  has a past medical history of Arthritis, Complete tear of left rotator cuff (11/09/2017), COPD (chronic obstructive pulmonary disease), COVID-19 infection (07/02/2021), Diabetes mellitus, H/o Cerebrovascular accident (CVA) of left pontine structure (12/12/2019), Hypertension, Personal history of colonic polyps, Stroke, and Wears glasses.    Past Surgical History:  has a past surgical history that includes Shoulder surgery; Hysterectomy; Colonoscopy (N/A, 4/11/2017); and Oophorectomy.    Family Medical History: family history includes Anemia in her daughter; Asthma in her mother; Diabetes in her brother, father, and mother; Hypertension in her brother and mother.    Social History:  reports that she has never smoked. She has never used smokeless tobacco. She reports that she does not drink alcohol and does not use drugs.    PCP: Cristina Ren MD    Allergies: Review of patient's allergies indicates:  No Known Allergies    Medications:   No current facility-administered medications on file prior to encounter.     Current Outpatient Medications on File Prior to Encounter   Medication Sig Dispense Refill    atorvastatin (LIPITOR) 40 MG tablet Take 1 tablet (40 mg total) by mouth once daily. 90 tablet 1    brimonidine 0.2% (ALPHAGAN) 0.2 % Drop Place 1 drop into both eyes every 12 (twelve) hours. 15 mL 11    cloNIDine 0.1 mg/24 hr td ptwk (CATAPRES) 0.1 mg/24 hr Place 1 patch onto the skin every 7 days. (Patient taking differently: Place 1 patch onto  "the skin every 7 days. WEDNESDAYS) 12 patch 1    hydrALAZINE (APRESOLINE) 100 MG tablet Take 1 tablet (100 mg total) by mouth 3 (three) times daily. 270 tablet 1    insulin aspart U-100 (NOVOLOG FLEXPEN U-100 INSULIN) 100 unit/mL (3 mL) InPn pen Inject 14 Units subcutaneously into the skin 3 (three) times daily before meals. 45 mL 3    insulin degludec (TRESIBA FLEXTOUCH U-100) 100 unit/mL (3 mL) insulin pen ADMINISTER 40 UNITS UNDER THE SKIN EVERY EVENING (Patient taking differently: Inject 40 Units into the skin every evening. ADMINISTER 40 UNITS UNDER THE SKIN EVERY EVENING) 12 pen 1    losartan (COZAAR) 50 MG tablet Take 1 tablet (50 mg total) by mouth once daily. 90 tablet 1    blood glucose control, low Soln Use as directed (Patient taking differently: 1 each by Misc.(Non-Drug; Combo Route) route. Use as directed) 1 each 1    blood sugar diagnostic (TRUE METRIX GLUCOSE TEST STRIP) Strp Test blood sugar twice daily 200 strip 3    clopidogreL (PLAVIX) 75 mg tablet Take 1 tablet (75 mg total) by mouth once daily. (Patient not taking: Reported on 4/27/2023) 90 tablet 1    HYDROcodone-acetaminophen (NORCO)  mg per tablet       lancets Misc 1 lancet by Misc.(Non-Drug; Combo Route) route 2 (two) times a day. To check BG 2 times daily, to use with insurance preferred meter 200 each 3    metoprolol succinate (TOPROL-XL) 100 MG 24 hr tablet Take 1 tablet (100 mg total) by mouth once daily. (Patient not taking: Reported on 4/27/2023) 90 tablet 1    OZURDEX 0.7 mg Impl intravitreal implant 0.7 mg by Intravitreal route once.      pen needle, diabetic (BD ULTRA-FINE SHORT PEN NEEDLE) 31 gauge x 5/16" Ndle 1 pen by Misc.(Non-Drug; Combo Route) route 4 (four) times daily. 300 each 5    tiotropium (SPIRIVA) 18 mcg inhalation capsule Inhale 1 capsule (18 mcg total) into the lungs once daily. Controller (Patient not taking: Reported on 4/27/2023) 90 capsule 3    [DISCONTINUED] aspirin 81 MG Chew Take 1 tablet (81 mg total) " by mouth once daily. 90 tablet 3    [DISCONTINUED] blood-glucose meter (TRUE METRIX GLUCOSE METER) kit Use as instructed 1 each 0    [DISCONTINUED] chlorthalidone (HYGROTEN) 50 MG Tab Take 1 tablet (50 mg total) by mouth once daily. 90 tablet 3    [DISCONTINUED] ergocalciferol (ERGOCALCIFEROL) 50,000 unit Cap Take 1 capsule (50,000 Units total) by mouth every 7 days. 12 capsule 1    [DISCONTINUED] lancing device (TRUEDRAW LANCING DEVICE) Misc Inject 1 Device into the skin 2 (two) times daily. 1 each 3     Scheduled Meds:   amLODIPine  10 mg Oral Daily    aspirin  81 mg Oral Daily    atorvastatin  40 mg Oral Daily    brimonidine 0.15 % OPTH DROP  1 drop Both Eyes Q12H    cloNIDine 0.1 mg/24 hr td ptwk  1 patch Transdermal Q7 Days    clopidogreL  75 mg Oral Daily    enoxaparin  40 mg Subcutaneous Daily    insulin detemir U-100  5 Units Subcutaneous QHS    losartan  50 mg Oral Daily     Continuous Infusions:   niCARdipine Stopped (04/27/23 0744)     PRN Meds:.acetaminophen, bisacodyL, dextrose 50%, dextrose 50%, glucagon (human recombinant), glucose, glucose, hydrALAZINE, insulin aspart U-100, iohexoL, melatonin, ondansetron, sodium chloride 0.9%      Physical Exam  Current Vitals:  Vitals:    04/27/23 0947   BP: (!) 162/76   Pulse: 66   Resp:    Temp:        Physical Exam:  General:  Awake, alert, oriented to self and place, not time  HEENT: PERRL, EOMI  CV: RRR  Lungs: no respiratory distress  Abdomen: soft, non tender    Neurological Exam    MENTAL STATUS EXAM:  Patient is awake, alert, oriented to self and place, not time.  Also oriented to situation.  Her speech is slurred but intelligible, naming, repetition and comprehension were intact.    CRANIAL NERVE EXAM:  II/III: fundoscopic exam deferred, PERRL; visual fields full to threat  III/IV/VI: EOMI  V:  Decreased light touch on right side  VII:  Mild right facial droop  VIII: no deficits in hearing bilaterally  IX/X: palate @ ML and raises symmetrically  XI:  shoulder shrug 5/5 bilaterally  XII: tongue to midline w/out asymmetry    MOTOR EXAM:  Bulk and Tone: normal throughout  Strength is 5/5 proximally and distally in upper and lower extremities without deficits.    REFLEXES:  3+ in bilateral upper and lower extremities.    SENSORY EXAM  Light touch is mildly decreased on right upper and lower extremity when compared to left side, rest intact throughout.    COORDINATION/CEREBELLAR EXAM:  FTN: no dysmetria or other signs of appendicular ataxia  HTS:  Unable to assess    GAIT:  Deferred for safety.    NIH Stroke Scale      Date: 2023  Time: 1300  Person Administering Scale: Jennifer Tony MD    Administer stroke scale items in the order listed. Record performance in each category after each subscale exam. Do not go back and change scores. Follow directions provided for each exam technique. Scores should reflect what the patient does, not what the clinician thinks the patient can do. The clinician should record answers while administering the exam and work quickly. Except where indicated, the patient should not be coached (i.e., repeated requests to patient to make a special effort).      1a  Level of consciousness: 0=alert; keenly responsive   1b. LOC questions:  0=Answers both tasks correctly   1c. LOC commands: 0=Answers both tasks correctly   2.  Best Gaze: 0=normal   3.  Visual: 0=No visual loss   4. Facial Palsy: 1=Minor paralysis (flattened nasolabial fold, asymmetric on smiling)   5a.  Motor left arm: 0=No drift, limb holds 90 (or 45) degrees for full 10 seconds   5b.  Motor right arm: 0=No drift, limb holds 90 (or 45) degrees for full 10 seconds   6a. motor left le=No drift, limb holds 90 (or 45) degrees for full 10 seconds   6b  Motor right le=No drift, limb holds 90 (or 45) degrees for full 10 seconds   7. Limb Ataxia: 0=Absent   8.  Sensory: 1=Mild to moderate sensory loss; patient feels pinprick is less sharp or is dull on the affected  side; there is a loss of superficial pain with pinprick but patient is aware She is being touched   9. Best Language:  0=No aphasia, normal   10. Dysarthria: 1=Mild to moderate, patient slurs at least some words and at worst, can be understood with some difficulty   11. Extinction and Inattention: 0=No abnormality    Total:   3       Laboratory Data & Studies    Recent Labs   Lab 04/26/23 2149   WBC 9.16   HGB 12.4      MCV 79*       Recent Labs   Lab 04/26/23 2149      K 4.0      CO2 26   BUN 19   *   CALCIUM 9.1   MG 1.8       Recent Labs   Lab 04/26/23 2149   PROT 7.3   ALBUMIN 3.6   BILITOT 0.5   AST 19   ALT 19   ALKPHOS 81       No results for input(s): LABPT, INR, APTT in the last 168 hours.    No results for input(s): HGBA1C, CHOL, TRIG, LDLCALC, HDL, TSH in the last 168 hours.    Microbiology:  Microbiology Results (last 7 days)       ** No results found for the last 168 hours. **            Imaging:  CT Head Without Contrast    Result Date: 4/26/2023  HISTORY: severe htn blurred vision TECHNIQUE: Serial axial noncontrast images of the brain were obtained with sagittal and coronal reconstructions. All CT scans at this facility use dose modulation, iterative reconstruction, and/or weightbase dosing when appropriate to reduce radiation dose to as low as reasonably achievable. COMPARISON: MRI of the brain dated May 19, 2022. FINDINGS: There is no evidence of acute intracranial hemorrhage or definite cortical infarction. Mild diffuse parenchymal volume loss is noted.No hydrocephalus.  No midline shift.  No extra-axial fluid collection. Nonspecific periventricular and subcortical hypodensities again noted similar to prior exam. The proximal M1 segments of the MCA's show no definite asymmetric hyperdensity. Basilar cisterns are patent. No skull fracture. The visualized paranasal sinuses and mastoid air cells are clear. IMPRESSION: No acute intracranial finding or significant interval  change. Electronically signed by:  Nicanor Del Valle MD  4/26/2023 10:22 PM CDT Workstation: [id number]    X-Ray Chest AP Portable    Result Date: 4/27/2023  CLINICAL HISTORY: 63 years (1960) Female Chest Pain Loss of Vision (C/o blurred vision x this morning approx 6am. ) chest pain TECHNIQUE: Portable AP radiograph the chest. COMPARISON: Radiograph from the 18th 22 FINDINGS: The lungs are clear. Costophrenic angles are seen without effusion. No pneumothorax is identified. The heart is normal in size. The mediastinum is within normal limits. There are degenerative changes in the shoulders. The visualized upper abdomen is unremarkable. IMPRESSION: No acute cardiac or pulmonary process. . Electronically signed by:  Serg Brambila MD  4/27/2023 7:06 AM CDT Workstation: [id number]    OCT, Retina - OU - Both Eyes    Result Date: 4/19/2023  Right Eye Quality was good. Scan locations included macula. Progression has improved. Findings include abnormal foveal contour, intraretinal fluid, cystoid macular edema. Left Eye Quality was good. Scan locations included macula. Progression has improved. Findings include cystoid macular edema, intraretinal fluid, abnormal foveal contour.       Assessment:    Hypertensive Emergency - target organ damage (brain)  Acute ischemic stroke  63-year-old woman with history of stroke, medication noncompliance, uncontrolled hypertension, diabetes, hyperlipidemia, who presented to the emergency room with blurred vision that started a few days prior, as well as slurred speech. Upon arrival to the ED, her blood pressure systolic was at 236, and MRI brain was obtained and showed punctate stroke in the right midbrain and another punctate stroke in the left cerebellum. Admitted for management of hypertensive emergency and stroke.    Stroke workup:  CTH:  No acute intracranial abnormality  CTA head and neck:  Ordered and pending  MRI brain:  Punctate strokes in right mid brain and left  cerebellum, innumerable microhemorrhages likely hypertensive etiology  Risk factors: A1C, TSH, LDL  Echocardiogram:  Ordered and pending    Plan:  - Admitted to hospital medicine with q4 hour neuro checks, on telemetry, continuous pulse oximetry  - Diet after eval per SLP.  - Secondary stroke prevention with ASA 81 mg daily, plavix 75 mg daily, atorvastatin 40 mg daily.  We will further discuss with patient if she is being compliant with dual antiplatelets, since she does have innumerable microhemorrhages which could be at risk of blooming into ICH  - Risk factor stratification with HgbA1C, Fasting Lipid profile, TSH  - 2D echocardiogram as above  - Maintain Euthermia with Tylenol prn temp > 37.2 degrees C.  - Assessment for rehab with PT/OT/SLP evaluation and treatment.  - Permissive HTN systolic BP goal up to 220, diastolic up to 110 for 24H, then lower BP slowly to normotension  - Cardiac enzymes and EKG   - Will follow along        DVT prophylaxis with chemo/SCD prophylaxis      [phone] 3 days from discharge.     Stroke education was provided including stroke risk factors modification and any acute neurological changes including weakness, confusion, visual changes to come straight to the ER.     All questions were answered.                              Thank you kindly for including us in the care of this patient. Please do not hesitate to contact us with any questions.    Critical Care:  62 minutes of critical care time has been spent evaluating with the patient. Time includes chart review not limited to diagnostic imaging, labs, and vitals, patient assessment, discussion with family and nursing, current order evaluations, and new order entries.      Jennifer Tony MD  Neurology

## 2023-04-27 NOTE — ED NOTES
Dr tidwell at bedside, md ordered hydralizine 10mg q 6hr prn as needed for sbp greater than 180, keep cardizem off at this time

## 2023-04-28 ENCOUNTER — CLINICAL SUPPORT (OUTPATIENT)
Dept: CARDIOLOGY | Facility: HOSPITAL | Age: 63
DRG: 065 | End: 2023-04-28
Attending: STUDENT IN AN ORGANIZED HEALTH CARE EDUCATION/TRAINING PROGRAM
Payer: MEDICARE

## 2023-04-28 VITALS — WEIGHT: 133 LBS | BODY MASS INDEX: 26.81 KG/M2 | HEIGHT: 59 IN

## 2023-04-28 VITALS
DIASTOLIC BLOOD PRESSURE: 66 MMHG | BODY MASS INDEX: 26.98 KG/M2 | RESPIRATION RATE: 18 BRPM | WEIGHT: 133.81 LBS | SYSTOLIC BLOOD PRESSURE: 165 MMHG | TEMPERATURE: 98 F | HEART RATE: 71 BPM | OXYGEN SATURATION: 96 % | HEIGHT: 59 IN

## 2023-04-28 LAB
ANION GAP SERPL CALC-SCNC: 9 MMOL/L (ref 8–16)
AORTIC ROOT ANNULUS: 2.6 CM
AORTIC VALVE CUSP SEPERATION: 1.9 CM
ASCENDING AORTA: 3 CM
AV INDEX (PROSTH): 0.83
AV MEAN GRADIENT: 2 MMHG
AV PEAK GRADIENT: 4 MMHG
AV VALVE AREA: 2.35 CM2
AV VELOCITY RATIO: 0.85
BSA FOR ECHO PROCEDURE: 1.58 M2
BUN SERPL-MCNC: 31 MG/DL (ref 8–23)
CALCIUM SERPL-MCNC: 9 MG/DL (ref 8.7–10.5)
CHLORIDE SERPL-SCNC: 106 MMOL/L (ref 95–110)
CO2 SERPL-SCNC: 21 MMOL/L (ref 23–29)
CREAT SERPL-MCNC: 1.4 MG/DL (ref 0.5–1.4)
CV ECHO LV RWT: 0.71 CM
DOP CALC AO PEAK VEL: 0.99 M/S
DOP CALC AO VTI: 22.3 CM
DOP CALC LVOT AREA: 2.8 CM2
DOP CALC LVOT DIAMETER: 1.9 CM
DOP CALC LVOT PEAK VEL: 0.84 M/S
DOP CALC LVOT STROKE VOLUME: 52.43 CM3
DOP CALC MV VTI: 27.5 CM
DOP CALCLVOT PEAK VEL VTI: 18.5 CM
E WAVE DECELERATION TIME: 241 MSEC
E/A RATIO: 0.68
E/E' RATIO: 20.29 M/S
ECHO LV POSTERIOR WALL: 1.21 CM (ref 0.6–1.1)
EJECTION FRACTION: 62 %
ERYTHROCYTE [DISTWIDTH] IN BLOOD BY AUTOMATED COUNT: 14.8 % (ref 11.5–14.5)
EST. GFR  (NO RACE VARIABLE): 41.8 ML/MIN/1.73 M^2
FRACTIONAL SHORTENING: 30 % (ref 28–44)
GLUCOSE SERPL-MCNC: 289 MG/DL (ref 70–110)
GLUCOSE SERPL-MCNC: 324 MG/DL (ref 70–110)
GLUCOSE SERPL-MCNC: 353 MG/DL (ref 70–110)
GLUCOSE SERPL-MCNC: 438 MG/DL (ref 70–110)
HCT VFR BLD AUTO: 34.4 % (ref 37–48.5)
HGB BLD-MCNC: 12.2 G/DL (ref 12–16)
INTERVENTRICULAR SEPTUM: 1.12 CM (ref 0.6–1.1)
IVC DIAMETER: 1.01 CM
LEFT ATRIUM VOLUME INDEX MOD: 22.5 ML/M2
LEFT ATRIUM VOLUME MOD: 34.9 CM3
LEFT INTERNAL DIMENSION IN SYSTOLE: 2.39 CM (ref 2.1–4)
LEFT VENTRICLE DIASTOLIC VOLUME INDEX: 31.03 ML/M2
LEFT VENTRICLE DIASTOLIC VOLUME: 48.1 ML
LEFT VENTRICLE MASS INDEX: 81 G/M2
LEFT VENTRICLE SYSTOLIC VOLUME INDEX: 12.9 ML/M2
LEFT VENTRICLE SYSTOLIC VOLUME: 20 ML
LEFT VENTRICULAR INTERNAL DIMENSION IN DIASTOLE: 3.42 CM (ref 3.5–6)
LEFT VENTRICULAR MASS: 125.49 G
LV LATERAL E/E' RATIO: 23.67 M/S
LV SEPTAL E/E' RATIO: 17.75 M/S
LVOT MG: 1 MMHG
LVOT MV: 0.54 CM/S
MCH RBC QN AUTO: 28.2 PG (ref 27–31)
MCHC RBC AUTO-ENTMCNC: 35.5 G/DL (ref 32–36)
MCV RBC AUTO: 79 FL (ref 82–98)
MV MEAN GRADIENT: 2 MMHG
MV PEAK A VEL: 1.04 M/S
MV PEAK E VEL: 0.71 M/S
MV PEAK GRADIENT: 5 MMHG
MV STENOSIS PRESSURE HALF TIME: 97 MS
MV VALVE AREA BY CONTINUITY EQUATION: 1.91 CM2
MV VALVE AREA P 1/2 METHOD: 2.27 CM2
OHS LV EJECTION FRACTION SIMPSONS BIPLANE MOD: 6 %
PLATELET # BLD AUTO: 412 K/UL (ref 150–450)
PMV BLD AUTO: 10.4 FL (ref 9.2–12.9)
POTASSIUM SERPL-SCNC: 3.8 MMOL/L (ref 3.5–5.1)
PV MV: 0.71 M/S
PV PEAK VELOCITY: 1.03 CM/S
RA PRESSURE: 3 MMHG
RBC # BLD AUTO: 4.33 M/UL (ref 4–5.4)
RV TISSUE DOPPLER FREE WALL SYSTOLIC VELOCITY 1 (APICAL 4 CHAMBER VIEW): 0.01 CM/S
SINUS: 2.57 CM
SODIUM SERPL-SCNC: 136 MMOL/L (ref 136–145)
STJ: 2.35 CM
TDI LATERAL: 0.03 M/S
TDI SEPTAL: 0.04 M/S
TDI: 0.04 M/S
TRICUSPID ANNULAR PLANE SYSTOLIC EXCURSION: 2.17 CM
WBC # BLD AUTO: 9.98 K/UL (ref 3.9–12.7)

## 2023-04-28 PROCEDURE — 97165 OT EVAL LOW COMPLEX 30 MIN: CPT

## 2023-04-28 PROCEDURE — 92523 SPEECH SOUND LANG COMPREHEN: CPT

## 2023-04-28 PROCEDURE — 25000003 PHARM REV CODE 250: Performed by: INTERNAL MEDICINE

## 2023-04-28 PROCEDURE — 80048 BASIC METABOLIC PNL TOTAL CA: CPT | Performed by: HOSPITALIST

## 2023-04-28 PROCEDURE — 97161 PT EVAL LOW COMPLEX 20 MIN: CPT

## 2023-04-28 PROCEDURE — 93306 ECHO (CUPID ONLY): ICD-10-PCS | Mod: 26,,, | Performed by: INTERNAL MEDICINE

## 2023-04-28 PROCEDURE — 36415 COLL VENOUS BLD VENIPUNCTURE: CPT | Performed by: HOSPITALIST

## 2023-04-28 PROCEDURE — 85027 COMPLETE CBC AUTOMATED: CPT | Performed by: HOSPITALIST

## 2023-04-28 PROCEDURE — 92610 EVALUATE SWALLOWING FUNCTION: CPT

## 2023-04-28 PROCEDURE — 97535 SELF CARE MNGMENT TRAINING: CPT

## 2023-04-28 PROCEDURE — 93306 TTE W/DOPPLER COMPLETE: CPT

## 2023-04-28 PROCEDURE — 93306 TTE W/DOPPLER COMPLETE: CPT | Mod: 26,,, | Performed by: INTERNAL MEDICINE

## 2023-04-28 PROCEDURE — 25000003 PHARM REV CODE 250: Performed by: HOSPITALIST

## 2023-04-28 RX ORDER — LOSARTAN POTASSIUM 50 MG/1
50 TABLET ORAL ONCE
Status: COMPLETED | OUTPATIENT
Start: 2023-04-28 | End: 2023-04-28

## 2023-04-28 RX ORDER — LOSARTAN POTASSIUM 50 MG/1
100 TABLET ORAL DAILY
Status: DISCONTINUED | OUTPATIENT
Start: 2023-04-28 | End: 2023-04-28

## 2023-04-28 RX ORDER — LOSARTAN POTASSIUM 50 MG/1
100 TABLET ORAL DAILY
Status: DISCONTINUED | OUTPATIENT
Start: 2023-04-29 | End: 2023-04-28 | Stop reason: HOSPADM

## 2023-04-28 RX ORDER — HYDRALAZINE HYDROCHLORIDE 25 MG/1
25 TABLET, FILM COATED ORAL EVERY 8 HOURS
Status: DISCONTINUED | OUTPATIENT
Start: 2023-04-28 | End: 2023-04-28 | Stop reason: HOSPADM

## 2023-04-28 RX ORDER — AMLODIPINE BESYLATE 10 MG/1
10 TABLET ORAL DAILY
Qty: 30 TABLET | Refills: 0 | Status: SHIPPED | OUTPATIENT
Start: 2023-04-29 | End: 2023-06-06 | Stop reason: SDUPTHER

## 2023-04-28 RX ORDER — NAPROXEN SODIUM 220 MG/1
81 TABLET, FILM COATED ORAL DAILY
Qty: 30 TABLET | Refills: 0 | Status: SHIPPED | OUTPATIENT
Start: 2023-04-28 | End: 2023-05-31 | Stop reason: SDUPTHER

## 2023-04-28 RX ORDER — INSULIN ASPART 100 [IU]/ML
5 INJECTION, SOLUTION INTRAVENOUS; SUBCUTANEOUS
Status: DISCONTINUED | OUTPATIENT
Start: 2023-04-28 | End: 2023-04-28 | Stop reason: HOSPADM

## 2023-04-28 RX ADMIN — HYDRALAZINE HYDROCHLORIDE 25 MG: 25 TABLET, FILM COATED ORAL at 02:04

## 2023-04-28 RX ADMIN — AMLODIPINE BESYLATE 10 MG: 5 TABLET ORAL at 08:04

## 2023-04-28 RX ADMIN — LOSARTAN POTASSIUM 50 MG: 50 TABLET, FILM COATED ORAL at 08:04

## 2023-04-28 RX ADMIN — BRIMONIDINE TARTRATE 1 DROP: 1.5 SOLUTION/ DROPS OPHTHALMIC at 08:04

## 2023-04-28 RX ADMIN — ATORVASTATIN CALCIUM 40 MG: 40 TABLET, FILM COATED ORAL at 08:04

## 2023-04-28 RX ADMIN — ASPIRIN 81 MG CHEWABLE TABLET 81 MG: 81 TABLET CHEWABLE at 08:04

## 2023-04-28 RX ADMIN — LOSARTAN POTASSIUM 50 MG: 50 TABLET, FILM COATED ORAL at 11:04

## 2023-04-28 RX ADMIN — CLOPIDOGREL BISULFATE 75 MG: 75 TABLET, FILM COATED ORAL at 08:04

## 2023-04-28 RX ADMIN — HYDRALAZINE HYDROCHLORIDE 25 MG: 25 TABLET, FILM COATED ORAL at 09:04

## 2023-04-28 RX ADMIN — INSULIN ASPART 4 UNITS: 100 INJECTION, SOLUTION INTRAVENOUS; SUBCUTANEOUS at 06:04

## 2023-04-28 NOTE — PT/OT/SLP EVAL
Speech Language Pathology Evaluation  Cognitive/Bedside Swallow    Patient Name:  Steff Johnson   MRN:  [id number]  Admitting Diagnosis: Hypertensive crisis    Recommendations:                  General Recommendations:  Speech/language therapy and Cognitive-linguistic therapy  Diet recommendations:  Easy to Chew Diet - IDDSI Level 7, Thin   Aspiration Precautions: Standard aspiration precautions   General Precautions: Standard, aspiration, fall, vision impaired    History:   Per HPI: 63-year-old woman with hypertension, diabetes, hyperlipidemia, history of CVA, noncompliance, presenting with bilateral vision changes.  Patient reports that 10:00 p.m. on Tuesday 04/25/2023 she noticed blurriness in her vision in both eyes.  It remained constant over the next 24 hours until she presented to the ED. in the ED, her blood pressure was elevated at 236 systolic.  She is noncompliant with medications, she did not take blood pressure medications today and does not take them on a regular basis.  She is supposed to use a clonidine patch but she took it off yesterday and did not replace it.  She states her blood pressure is always high.  She is otherwise asymptomatic, no headache, chest pain, shortness at breath, palpitations, dizziness, syncope, numbness or tingling.  Denies alcohol, tobacco, or drug use.  In the ED, she was given antihypertensives but her blood pressure remained in the 200 systolic so she was started on a nicardipine drip.  Her blood pressure is improving and she states her vision is still poor.    Past Medical History:   Diagnosis Date    Arthritis     Complete tear of left rotator cuff 11/09/2017    COPD (chronic obstructive pulmonary disease)     COVID-19 infection 07/02/2021    Diabetes mellitus     H/o Cerebrovascular accident (CVA) of left pontine structure 12/12/2019    Hypertension     Personal history of colonic polyps     Stroke     Wears glasses        Past Surgical History:   Procedure Laterality  Date    COLONOSCOPY N/A 4/11/2017    Procedure: COLONOSCOPY;  Surgeon: Jared Antonio MD;  [facility];  Service: Endoscopy;  Laterality: N/A;    HYSTERECTOMY      OOPHORECTOMY      SHOULDER SURGERY      R       Social History: Patient lives with SO and brother.    Prior Intubation HX:  none this admit    Modified Barium Swallow: None in Epic    Imaging:  Imaging Results              CTA Head and Neck (xpd) (Final result)  Result time 04/27/23 19:06:53      Final result by Otilio Frausto MD (04/27/23 19:06:53)                   Narrative:      EXAM: CTA HEAD AND NECK (XPD)    HISTORY: Neuro deficit, acute, stroke suspected    COMPARISON: CT scan of the head without contrast dated 4/26/2023. MRI of the brain from earlier today.    TECHNIQUE: Spiral CT images were obtained through the neck and head during rapid IV injection of contrast and were reconstructed in 1 mm thick axial slices. Multiple coronal and sagittal MIP reconstructions were produced.    This exam was performed according to our departmental dose-optimization program, which includes automated exposure control, adjustment of the mA and/or kV according to patient size and/or use of iterative reconstruction technique.    Unless otherwise stated, incidental findings do not require dedicated follow-up imaging.    FINDINGS: There is normal branching of the great vessels from the aortic arch that are all widely patent without NASCET significant stenosis. No stenoses are identified in either, common or external or internal carotid artery in the neck. Incidentally noted is that the carotid bifurcations are unusually medial in location within the retropharyngeal soft tissues. The carotid bifurcations are otherwise unremarkable. The middle cerebral arteries and anterior cerebral arteries and their first order branches appear within normal limits. There are no focal occlusions or significant intracranial stenoses. The vertebral arteries are codominant  and are both widely patent throughout their course in the neck and head. They both supply the basilar artery, which is widely patent as well. No definite aneurysms are identified. The dural venous sinuses are patent. There are no obvious enhancing lesions within the brain parenchyma.    IMPRESSION:   Unremarkable CT angiography imaging of the head and neck. No vascular stenoses or occlusions are identified.    Electronically signed by:  Haresh Frausto MD  4/27/2023 7:06 PM CDT Workstation: [id number]                                     MRI Brain W WO Contrast (Final result)  Result time 04/27/23 15:14:43      Final result by Serg Carty Jr., MD (04/27/23 15:14:43)                   Narrative:    Examination: MRI brain with and without contrast    CLINICAL HISTORY: Neurological deficit. Stroke suspected.    COMPARISON: Correlation is made with patient's previous MR of the brain without contrast 7/25/2022. Correlation is also made with the patient's more remote study of 1/5/2022.    TECHNIQUE: Standard MR evaluation of the brain was employed utilizing a combination T1, T2, and gradient echo sequences in all 3 orthogonal planes under a high-field system.    FINDINGS:    Generalized central and cortical involutional changes are established that are age concordant in keeping with the patient's chronologic age of 63 years of veins.    Referenced foci of altered signal intensity changes manifested on the diffusion-weighted images are no longer seen and upper regina and cerebral peduncles with return to normal parameters without evidence of any recurrent diffusion base on the current examination.    There are multiple foci of hemosiderin deposition again established on the susceptibility weighted images at both the supratentorial and infratentorial level. Largest single index focus localizing patient's anterior horn of the left basal ganglia measuring upwards of 7 mm in size. These are equal distribution as compared to  previous.    There are superimposed chronic white matter ischemic changes with confluent hyperintense signal intensity changes about the posterior horns of the lateral ventricles with evidence of organized areas of hyperintense signal intensity distributed throughout the subcortical and periventricular white matter due to ongoing deep white matter ischemia. Old lacunar infarct involving the penetrating vessels of the MCA territory at the level of the external capsule.    There is no evidence of ventriculomegaly. No evidence of masses, mass effect, or midline shift. The major intracranial flow is well-established. The orbits and retro-orbital compartments are well-maintained.    IMPRESSION:  1. Multiple foci of decreased signal intensity on the susceptibility images throughout the brain parenchyma both the supratentorial and infratentorial level due to old chronic microhemorrhages that appear unchanged. Amyloid deposition may give a similar findings should be addressed based the patient's clinical grounds.  2. Resolution of the areas of diffusion restriction based on the lowermost study of 1/15/2022 return to normal limits.  3. No evidence of new or recurrent area of altered signal intensity changes suggestive of a new acute vascular episode.    Electronically signed by:  Serg Carty MD  4/27/2023 3:14 PM CDT Workstation: [id number]                                     CT Head Without Contrast (Final result)  Result time 04/26/23 22:22:27      Final result by Nicanor Del Valle MD (04/26/23 22:22:27)                   Narrative:      HISTORY:  severe htn blurred vision    TECHNIQUE:  Serial axial noncontrast images of the brain were obtained with sagittal and coronal reconstructions.    All CT scans at this facility use dose modulation, iterative reconstruction, and/or weightbase dosing when appropriate to reduce radiation dose to as low as reasonably achievable.    COMPARISON:  MRI of the brain dated May 19,  2022.    FINDINGS:  There is no evidence of acute intracranial hemorrhage or definite cortical infarction.    Mild diffuse parenchymal volume loss is noted.No hydrocephalus.  No midline shift.  No extra-axial fluid collection.    Nonspecific periventricular and subcortical hypodensities again noted similar to prior exam.    The proximal M1 segments of the MCA's show no definite asymmetric hyperdensity.    Basilar cisterns are patent.    No skull fracture.    The visualized paranasal sinuses and mastoid air cells are clear.    IMPRESSION:    No acute intracranial finding or significant interval change.    Electronically signed by:  Nicanor Del Valle MD  4/26/2023 10:22 PM CDT Workstation: [id number]                                     X-Ray Chest AP Portable (Final result)  Result time 04/27/23 07:06:21      Final result by Serg Brambila MD (04/27/23 07:06:21)                   Narrative:    CLINICAL HISTORY:  63 years (1960) Female Chest Pain Loss of Vision (C/o blurred vision x this morning approx 6am. ) chest pain    TECHNIQUE:  Portable AP radiograph the chest.    COMPARISON:  Radiograph from the 18th 22    FINDINGS:  The lungs are clear. Costophrenic angles are seen without effusion. No pneumothorax is identified. The heart is normal in size. The mediastinum is within normal limits. There are degenerative changes in the shoulders. The visualized upper abdomen is unremarkable.    IMPRESSION:  No acute cardiac or pulmonary process.                  .            Electronically signed by:  Serg Brambila MD  4/27/2023 7:06 AM CDT Workstation: [id number]                                           Prior diet: Regular/thin.    Occupation/hobbies/homemaking: Pt reports PLOF as assistance with ADLs, ambulates with walker. Does not drive. Level of education is 9th grade.    Subjective   Requesting to get back in bed    Pain/Comfort:  Pain Rating 1: 0/10    Respiratory Status: Room air    Objective:   Pt seen for  clinical swallow and cognitive communication evaluations. She is AAOx3, pleasant and cooperative. Sitting up in chair. Pt is a fair historian with no family at bedside to supplement/verify history. She reports worsening dysarthria. Denies dysphagia.     Cognitive Status:  MoCA (Version 8.2) administered with pt achieving a score of 9/30 suggesting severe cognitive-linguistic impairment. Pt earned 1 of 5 points on visuospatial/executive functions (poor vision possibly adversely affecting performance), 2 of 3 points on naming, 1 of 6 points for attention, 2 of 3 points for language, 0 of 2 points for abstraction, 0 of 5 points for delayed recall and 3 of 6 points for orientation.  2019--SLUMS 15/30  Memory Immediate Recall --Fair, Delayed--Recalled 0/5 unrelated words following 5 minute delay. Able to accurately recall lunch items and details of phone conversation with SO, and recall general information --impaired. Unable to state current preside/governor. Poor recall of personal information including age and address.  Problem Solving Solutions --Fair  Simple calculation --impaired across 3 of 3 attempts       Receptive Language:   Comprehension:      Questions Simple yes/no --100% accuracy  Complex yes/no --88% accuracy  Commands  One step --WFL  two step basic commands --WFL  multistep basic commands --50% accuracy  complex/abstract commands --impaired  Conversation --WFL for basic needs/wants. Increased difficulty with increased complexity. Difficulty responding to open ended questions      Expressive Language:  Verbal:    Automatic Speech  Days of the week --WNL, Months of the year --WNL, and Sentence completion --WFL  Repetition Sentences --WFL  Naming Confrontation --WFL real objects, 83% accuracy pics, Divergent responsive --poor, and Single word responsive naming --WFL  Conversational speech -- mild word finding deficits at conversational level      Motor Speech:  Dysarthria --moderate  Intelligibility  --reduced- ~50-75% intelligible. Improves during structured tasks  Articulation --imprecise vs irregular articulatory breakdown  +incoordination  +hesitations  ?? Mild hypernasality. Will continue to assess motor speech.    Visual-Spatial: Will continue to assess. Poor vision    Reading: TBA    Written Expression: TBA      Oral Musculature Evaluation  Oral Musculature: WFL (very mild R weakness)  Dentition: present and adequate (few missing)  Secretion Management: adequate  Mucosal Quality: adequate  Mandibular Strength and Mobility: WFL  Oral Labial Strength and Mobility: WFL (mildly reduced on R)  Lingual Strength and Mobility: impaired strength  Velar Elevation: WFL  Buccal Strength and Mobility: impaired  Volitional Cough: fair  Volitional Swallow: able to palpate laryngela rise    Bedside Swallow Eval:   Consistencies Assessed:  Thin liquids --via cup and straw  Puree --applesauce  Mixed consistencies --diced peaches  Solids --chuckie cracker      Oral Phase:   Prolonged, slow oral prep with diced peaches    Pharyngeal Phase:   no overt clinical signs/symptoms of aspiration    Compensatory Strategies  None    Treatment: Pt/family educated re: results/recs of evaluation, SLP role and POC. Receptive to information provided.     Assessment:   Clinical swallow and cognitive communication evaluations completed. Pt presents with acute on chronic dysarthria and severe cognitive linguistic deficits. Oropharyngeal swallow judged to be WFL. REC Regular--easy to chew-- textures (IDDSI 7a) with thin liquids. Initiate skilled ST to address deficits.    Goals:   Multidisciplinary Problems       SLP Goals          Problem: SLP    Goal Priority Disciplines Outcome   SLP Goal     SLP Ongoing, Progressing   Description: 1. Demonstrate use of CSS at sentences level with MIN cues and 80% intelligibility  2. Focused attention task x1 minute with 90% accuracy  3. Recall personal information with MIN A (age, , address, etc)  4.  Follow multistep commands with MIN A                        Plan:     Patient to be seen:  5 x/week   Plan of Care expires:  05/12/23  Plan of Care reviewed with:  patient   SLP Follow-Up:  Yes       Discharge recommendations:  Discharge Facility/Level of Care Needs: home health speech therapy (close supervision, assistance with medication management)   Barriers to Discharge:  Level of Skilled Assistance Needed ., Safety Awareness ,, and requires assistance with medication management    Time Tracking:     SLP Treatment Date:   04/28/23  Speech Start Time:  1323  Speech Stop Time:  1408     Speech Total Time (min):  45 min    Billable Minutes: Eval 30 , Eval Swallow and Oral Function 15, and Total Time 45    04/28/2023

## 2023-04-28 NOTE — NURSING
Nurses Note -- 4 Eyes      4/27/2023   10:30 PM      Skin assessed during: Admit      [x] No Altered Skin Integrity Present    [x]Prevention Measures Documented      [] Yes- Altered Skin Integrity Present or Discovered   [] LDA Added if Not in Epic (Describe Wound)   [] New Altered Skin Integrity was Present on Admit and Documented in LDA   [] Wound Image Taken    Wound Care Consulted? No    Attending Nurse:  Fern Vaca LPN     Second RN/Staff Member:  Stephanie GARCIA

## 2023-04-28 NOTE — DISCHARGE SUMMARY
[facility]  Discharge Summary  Patient Name: Steff Johnson MRN: [id number]   Patient Class: IP- Inpatient  Length of Stay: 1   Admission Date: 4/26/2023  9:16 PM Attending Physician: Juan Alberto Iqbal MD   Primary Care Provider: Cristina Ren MD Face-to-Face encounter date: 04/28/2023   Chief Complaint: Loss of Vision (C/o blurred vision x this morning approx 6am. )    Date of Discharge: 4/28/2023  Discharge Disposition:Home-Health Care [facility]    Condition: Stable       Reason for Hospitalization     Active Hospital Problems    Diagnosis    *Hypertensive crisis    punctate stroke in the right midbrain   punctate stroke in the left cerebellum    H/o Cerebrovascular accident (CVA) of left pontine structure    Hyperglycemia due to type 2 diabetes mellitus         Brief History of Present Illness    Steff Johnson is a 63 y.o.  female who  has a past medical history of Arthritis, Complete tear of left rotator cuff (11/09/2017), COPD (chronic obstructive pulmonary disease), COVID-19 infection (07/02/2021), Diabetes mellitus, H/o Cerebrovascular accident (CVA) of left pontine structure (12/12/2019), Hypertension, Personal history of colonic polyps, Stroke, and Wears glasses.. The patient presented to [facility] on 4/26/2023 with a primary complaint of Loss of Vision (C/o blurred vision x this morning approx 6am. )  .     For the full HPI please refer to the History & Physical from this admission.    Hospital Course By Problem with Pertinent Findings     Admitted for blurry vision and speech change secondary to hypertensive emergency and CVA. Neurology consulted. Patient underwent MRI that confirmed punctate stroke in the right midbrain and another punctate stroke in the left cerebellum. Blood pressure controlled with nicardipine GTT that was later changed to PO medications. Blood pressure have improved. Clinically patient improving. PT and OT recommended home health that has been arranged.  "Patient will be discharged home in stable condition.     Patient was seen and examined on the date of discharge and determined to be suitable for discharge.    Physical Exam  BP (!) 165/66 (BP Location: Right arm, Patient Position: Lying)   Pulse 71   Temp 97.6 °F (36.4 °C) (Oral)   Resp 18   Ht 4' 11" (1.499 m)   Wt 60.7 kg (133 lb 12.8 oz)   SpO2 96%   BMI 27.02 kg/m²   Vitals reviewed.    Constitutional: No distress.   HENT: Atraumatic.   Cardiovascular: Normal rate, regular rhythm and normal heart sounds.   Pulmonary/Chest: Effort normal. Clear to auscultation bilaterally. No wheezes.   Abdominal: Soft. Bowel sounds are normal. Exhibits no distension and no mass. No tenderness  Neurological: Alert.   Skin: Skin is warm and dry.     Following labs were Reviewed   Recent Labs   Lab 04/28/23  0324   WBC 9.98   HGB 12.2   HCT 34.4*      CALCIUM 9.0      K 3.8   CO2 21*      BUN 31*   CREATININE 1.4     No results found for: POCTGLUCOSE     All labs within the past 24 hours have been reviewed    Microbiology Results (last 7 days)       ** No results found for the last 168 hours. **          CTA Head and Neck (xpd)   Final Result      MRI Brain W WO Contrast   Final Result      CT Head Without Contrast   Final Result      X-Ray Chest AP Portable   Final Result          No results found. However, due to the size of the patient record, not all encounters were searched. Please check Results Review for a complete set of results.      Consultants and Procedures   Consultants:  Consults (From admission, onward)          Status Ordering Provider     Inpatient consult to Neurology  Once        Provider:  Jennifer Meyer MD    Completed ESTHER VELAZCO            Procedures:   * No surgery found *     Discharge Information:   Diet:  Resume cardiac diet    Physical Activity:  Activity as tolerated    Instructions:  1. Take all medications as prescribed  2. Keep all follow-up appointments  3. " Return to the hospital or call your primary care physicians if any worsening symptoms such as chest pain, shortness of breath, new weakness, numbness occur.    Follow-Up Appointments:  Please call your primary care physician to schedule an appointment in 1 week time.    Pending laboratory work/Tests to be performed/followed by the Primary care Physician: None    The patient was discharged in the care of her parents//wife/family/caregiver, with discharge instructions were reviewed in written and verbal form. All pertinent questions were discussed and prescriptions were provided. The importance of making follow up appointments and compliance of medications has been stressed repeatedly. The patient will follow up in 1 week or sooner as needed with the PCP, and the patient is on board with the plan. Upon discharge, patient needs to be on following medications.    Discharge Medications:     Medication List        START taking these medications      amLODIPine 10 MG tablet  Commonly known as: NORVASC  Take 1 tablet (10 mg total) by mouth once daily.  Start taking on: April 29, 2023     clopidogreL 75 mg tablet  Commonly known as: PLAVIX  Take 1 tablet (75 mg total) by mouth once daily.     metoprolol succinate 100 MG 24 hr tablet  Commonly known as: TOPROL-XL  Take 1 tablet (100 mg total) by mouth once daily.            CHANGE how you take these medications      cloNIDine 0.1 mg/24 hr td ptwk 0.1 mg/24 hr  Commonly known as: CATAPRES  Place 1 patch onto the skin every 7 days.  What changed: additional instructions     insulin degludec 100 unit/mL (3 mL) insulin pen  Commonly known as: TRESIBA FLEXTOUCH U-100  ADMINISTER 40 UNITS UNDER THE SKIN EVERY EVENING  What changed:   how much to take  how to take this  when to take this            CONTINUE taking these medications      aspirin 81 MG Chew  Take 1 tablet (81 mg total) by mouth once daily.     atorvastatin 40 MG tablet  Commonly known as: LIPITOR  Take 1 tablet  "(40 mg total) by mouth once daily.     hydrALAZINE 100 MG tablet  Commonly known as: APRESOLINE  Take 1 tablet (100 mg total) by mouth 3 (three) times daily.     losartan 50 MG tablet  Commonly known as: COZAAR  Take 1 tablet (50 mg total) by mouth once daily.     OZURDEX 0.7 mg Impl intravitreal implant  Generic drug: dexAMETHasone     pen needle, diabetic 31 gauge x 5/16" Ndle  Commonly known as: BD ULTRA-FINE SHORT PEN NEEDLE  1 pen by Misc.(Non-Drug; Combo Route) route 4 (four) times daily.            STOP taking these medications      blood glucose control, low Soln     blood sugar diagnostic Strp  Commonly known as: TRUE METRIX GLUCOSE TEST STRIP     brimonidine 0.2% 0.2 % Drop  Commonly known as: ALPHAGAN     HYDROcodone-acetaminophen  mg per tablet  Commonly known as: NORCO     insulin aspart U-100 100 unit/mL (3 mL) Inpn pen  Commonly known as: NovoLOG FlexPen U-100 Insulin     lancets Misc            ASK your doctor about these medications      tiotropium 18 mcg inhalation capsule  Commonly known as: SPIRIVA  Inhale 1 capsule (18 mcg total) into the lungs once daily. Controller               Where to Get Your Medications        These medications were sent to [facility]  [address], [place] [address]      Hours: Mon-Fri, 8a-5:30p Phone: [phone]   amLODIPine 10 MG tablet  aspirin 81 MG Chew           I spent 40 minutes preparing the discharge including reviewing records from previous encounters, preparation of discharge summary, assessing and final examination of the patient, discharge medicine reconciliation, discussing plan of care, follow up and education and prescriptions.       Juan Alberto Iqbal  [facility] Hospitalist  04/28/2023          "

## 2023-04-28 NOTE — PLAN OF CARE
Problem: Adult Inpatient Plan of Care  Goal: Plan of Care Review  Outcome: Ongoing, Progressing  Goal: Patient-Specific Goal (Individualized)  Outcome: Ongoing, Progressing     Problem: Fall Injury Risk  Goal: Absence of Fall and Fall-Related Injury  Outcome: Ongoing, Progressing     Problem: Diabetes Comorbidity  Goal: Blood Glucose Level Within Targeted Range  Outcome: Ongoing, Progressing

## 2023-04-28 NOTE — NURSING
Patient admitted to room 2503 in stable condition accompanied by her significant other with her personal belongings. Patient assisted to transfer from stretcher to bed. Care assumed

## 2023-04-28 NOTE — PT/OT/SLP EVAL
Occupational Therapy   Evaluation    Name: Steff Johnson  MRN: [id number]  Admitting Diagnosis: Hypertensive crisis  Recent Surgery: * No surgery found *      Recommendations:     Discharge Recommendations: home health OT  Discharge Equipment Recommendations:  none  Barriers to discharge:   (increased assist with ADLs and mobility)    Assessment:     Steff Johnson is a 63 y.o. female with a medical diagnosis of Hypertensive crisis.  Pt agreeable to OT evaluation this AM. Performance deficits affecting function: weakness, impaired endurance, gait instability, impaired functional mobility, impaired self care skills, impaired balance, decreased upper extremity function, decreased lower extremity function, decreased safety awareness, impaired cardiopulmonary response to activity.      Rehab Prognosis: Fair; patient would benefit from acute skilled OT services to address these deficits and reach maximum level of function.       Plan:     Patient to be seen 5 x/week to address the above listed problems via self-care/home management, therapeutic activities, therapeutic exercises  Plan of Care Expires: 05/28/23  Plan of Care Reviewed with: patient    Subjective     Chief Complaint: none stated  Patient/Family Comments/goals: none stated    Occupational Profile:  Living Environment: Pt lives with friend in a mobile home with ~ 4 steps to enter with a railing. Pt has a tub/shower combo with a shower chair and standard height toilets.   Previous level of function: Mod I with ADLs and mobility; Pt's friend cooks, cleans, and provides transportation. Pt reports depending on the day and how she feels, she either uses her rollator for mobility or propels herself in her w/c.  Roles and Routines: limited homemaker  Equipment Used at Home: wheelchair, rollator, shower chair  Assistance upon Discharge: yes, from friend/family    Pain/Comfort:  Pain Rating 1: 0/10    Patients cultural, spiritual, Yazdanism conflicts given the current  situation:      Objective:     Communicated with: nursing prior to session.  Patient found HOB elevated with bed alarm, telemetry, PureWick upon OT entry to room.    General Precautions: Standard, fall  Orthopedic Precautions: N/A  Braces: N/A  Respiratory Status: Room air    Occupational Performance:    Bed Mobility:    Patient completed Supine to Sit with contact guard assistance  Patient completed Sit to Supine with contact guard assistance    Functional Mobility/Transfers:  Patient completed Sit <> Stand Transfer with minimum assistance  with  rolling walker   Functional Mobility: pt amb short distance with RW with minimum assistance with cues needed for RW management and sequencing of transfer back to EOB    Activities of Daily Living:  Feeding:  independence per pt report  Grooming: stand by assistance seated EOB to simulate washing face (pt declined at this time)  Lower Body Dressing: stand by assistance seated EOB to don/doff socks  Toileting: purewick      Cognitive/Visual Perceptual:  Cognitive/Psychosocial Skills:     -       Oriented to: Person, Place, and Time   -       Follows Commands/attention:Follows one-step commands  -       Communication: clear/fluent  -       Memory: deficits  -       Safety awareness/insight to disability: impaired   -       Mood/Affect/Coping skills/emotional control: Appropriate to situation, Cooperative, and Pleasant    Physical Exam:  Balance:    -       SBA seated balance; Min A standing balance  Upper Extremity Range of Motion:     -       Right Upper Extremity: WFL  -       Left Upper Extremity: WFL  Upper Extremity Strength:    -       Right Upper Extremity: 4+/5 grossly  -       Left Upper Extremity: WFL   Strength:    -       Right Upper Extremity: fair   -       Left Upper Extremity: WFL  Fine Motor Coordination:    -       Intact  Gross motor coordination:   WFL    AMPAC 6 Click ADL:  AMPAC Total Score: 19    Treatment & Education:  Pt educated on role of  OT/POC, importance of OOB/EOB activity, use of call bell, and safety during ADLs, transfers, and functional mobility.    Patient left HOB elevated with all lines intact, call button in reach, and bed alarm on    GOALS:   Multidisciplinary Problems       Occupational Therapy Goals          Problem: Occupational Therapy    Goal Priority Disciplines Outcome Interventions   Occupational Therapy Goal     OT, PT/OT     Description: Goals to be met by: 5/28/23     Patient will increase functional independence with ADLs by performing:    UE Dressing with Supervision.  LE Dressing with Supervision.  Grooming while seated with Supervision.  Toileting from toilet with Supervision for hygiene and clothing management.   Toilet transfer to toilet with Supervision.                         History:     Past Medical History:   Diagnosis Date    Arthritis     Complete tear of left rotator cuff 11/09/2017    COPD (chronic obstructive pulmonary disease)     COVID-19 infection 07/02/2021    Diabetes mellitus     H/o Cerebrovascular accident (CVA) of left pontine structure 12/12/2019    Hypertension     Personal history of colonic polyps     Stroke     Wears glasses          Past Surgical History:   Procedure Laterality Date    COLONOSCOPY N/A 4/11/2017    Procedure: COLONOSCOPY;  Surgeon: Jared Antonio MD;  Location: [facility];  Service: Endoscopy;  Laterality: N/A;    HYSTERECTOMY      OOPHORECTOMY      SHOULDER SURGERY      R       Time Tracking:     OT Date of Treatment: 04/28/23  OT Start Time: 1038  OT Stop Time: 1052  OT Total Time (min): 14 min    Billable Minutes:Evaluation 6  Self Care/Home Management 8    4/28/2023

## 2023-04-28 NOTE — PT/OT/SLP EVAL
Physical Therapy Evaluation    Patient Name:  Steff Johnson   MRN:  [id number]    Recommendations:     Discharge Recommendations: home health PT   Discharge Equipment Recommendations: none   Barriers to discharge: None    Assessment:     Steff Johnson is a 63 y.o. female admitted with a medical diagnosis of Hypertensive crisis.  She presents with the following impairments/functional limitations: weakness, impaired endurance, impaired functional mobility, gait instability, impaired balance, decreased upper extremity function, decreased lower extremity function, decreased safety awareness, visual deficits. Patient is agreeable to participation with PT evaluation. She denies pain at rest. She states she lives with significant other and brother and uses a rollator for ambulation at baseline. She requires Lam for supine <> sit <> stand with RW. She ambulated 15' in room with RW and CGA-Lam with complaints of impaired vision. She is agreeable to sit up in chair with chair alarm on and RN notified.     Rehab Prognosis: Good; patient would benefit from acute skilled PT services to address these deficits and reach maximum level of function.    Recent Surgery: * No surgery found *      Plan:     During this hospitalization, patient to be seen 6 x/week to address the identified rehab impairments via gait training, therapeutic activities, therapeutic exercises, neuromuscular re-education and progress toward the following goals:    Plan of Care Expires:  05/28/23    Subjective     Chief Complaint: cannot find phone - PT found it in her bag and handed it to her prior to leaving  Patient/Family Comments/goals: agrees to sit up in chair for lunch  Pain/Comfort:  Pain Rating 1: 0/10    Patients cultural, spiritual, Jehovah's witness conflicts given the current situation:      Living Environment:  Patient lives with significant other and brother in a mobile home with ramp to enter   Prior to admission, patients level of function was  Mikhail with rollator. She denies any recent falls or PT.She has a history of CVA last year with R yolanda. She does not drive.  Equipment used at home: wheelchair, rollator, bedside commode, shower chair, cane, straight.  DME owned (not currently used): none.  Upon discharge, patient will have assistance from HHPT and family.    Objective:     Communicated with RN [place] prior to session.  Patient found HOB elevated with bed alarm, PureWick, telemetry  upon PT entry to room.    General Precautions: Standard, fall  Orthopedic Precautions:N/A   Braces: N/A  Respiratory Status: Room air    Exams:  RLE Strength: 3+/5  LLE Strength: 4/5    Functional Mobility:  Bed Mobility:     Supine to Sit: minimum assistance  Transfers:     Sit to Stand:  minimum assistance with rolling walker  Gait: 15' in room with RW and CGA-Lam with complaints of impaired vision      AM-PAC 6 CLICK MOBILITY  Total Score:17       Treatment & Education:  Patient was educated on the importance of OOB activity and functional mobility to negate negative effects of prolonged bed rest during hospitalization, safe transfers and ambulation, and D/C planning     Patient left up in chair with all lines intact, call button in reach, chair alarm on, and RN notified.    GOALS:   Multidisciplinary Problems       Physical Therapy Goals          Problem: Physical Therapy    Goal Priority Disciplines Outcome Goal Variances Interventions   Physical Therapy Goal     PT, PT/OT      Description: Goals to be met by: 23    Patient will increase functional independence with mobility by performin. Supine to sit with Supervision  2. Sit to stand transfer with Supervision  3. Bed to chair transfer with Supervision using Rolling Walker  4. Gait  x 250 feet with Supervision using Rolling Walker.   5. Lower extremity exercise program x20 reps per handout, with supervision                       History:     Past Medical History:   Diagnosis Date    Arthritis     Complete  tear of left rotator cuff 11/09/2017    COPD (chronic obstructive pulmonary disease)     COVID-19 infection 07/02/2021    Diabetes mellitus     H/o Cerebrovascular accident (CVA) of left pontine structure 12/12/2019    Hypertension     Personal history of colonic polyps     Stroke     Wears glasses        Past Surgical History:   Procedure Laterality Date    COLONOSCOPY N/A 4/11/2017    Procedure: COLONOSCOPY;  Surgeon: Jared Antonio MD;  Location: [facility];  Service: Endoscopy;  Laterality: N/A;    HYSTERECTOMY      OOPHORECTOMY      SHOULDER SURGERY      R       Time Tracking:     PT Received On: 04/28/23  PT Start Time: 1202     PT Stop Time: 1212  PT Total Time (min): 10 min     Billable Minutes: Evaluation 10      04/28/2023

## 2023-04-28 NOTE — PLAN OF CARE
Problem: SLP  Goal: SLP Goal  Description: 1. Demonstrate use of CSS at sentences level with MIN cues and 80% intelligibility  2. Focused attention task x1 minute with 90% accuracy  3. Recall personal information with MIN A (age, , address, etc)  4. Follow multi step commands with MIN A   Outcome: Ongoing, Progressing       Clinical swallow and cognitive communication evaluations completed. Pt presents with dysarthria and severe cognitive linguistic deficits. Oropharyngeal swallow judged to be WFL. REC Regular--easy to chew-- textures (IDDSI 7a) with thin liquids. Initiate skilled ST to address deficits.   RX sent electronically to patient's preferred pharmacy.

## 2023-04-28 NOTE — PLAN OF CARE
Problem: Adult Inpatient Plan of Care  Goal: Plan of Care Review  Outcome: Ongoing, Progressing  Goal: Patient-Specific Goal (Individualized)  Outcome: Ongoing, Progressing  Goal: Absence of Hospital-Acquired Illness or Injury  Outcome: Ongoing, Progressing  Goal: Optimal Comfort and Wellbeing  Outcome: Ongoing, Progressing  Goal: Readiness for Transition of Care  Outcome: Ongoing, Progressing     Problem: Fall Injury Risk  Goal: Absence of Fall and Fall-Related Injury  Outcome: Ongoing, Progressing     Problem: Fatigue  Goal: Improved Activity Tolerance  Outcome: Ongoing, Progressing     Problem: Diabetes Comorbidity  Goal: Blood Glucose Level Within Targeted Range  Outcome: Ongoing, Progressing

## 2023-04-28 NOTE — PLAN OF CARE
Patient cleared for discharge from case management standpoint.    Follow up appointments scheduled and added to AVS.    CM received orders for outpatient PT/OT . Ambulatory referral placed and pt will receive a call from scheduling with appointment details.    Chart and discharge orders reviewed.  Patient discharged home with no further case management needs.       04/28/23 1607   Final Note   Assessment Type Final Discharge Note   Anticipated Discharge Disposition Home   Hospital Resources/Appts/Education Provided Provided patient/caregiver with written discharge plan information;Appointments scheduled and added to AVS   Post-Acute Status   Discharge Delays None known at this time

## 2023-04-28 NOTE — DISCHARGE INSTRUCTIONS
Diet:  Resume cardiac diet    Physical Activity:  Activity as tolerated    Instructions:  1. Take all medications as prescribed  2. Keep all follow-up appointments  3. Return to the hospital or call your primary care physicians if any worsening symptoms such as chest pain, shortness of breath, new weakness, numbness occur.    Follow-Up Appointments:  Please call your primary care physician to schedule an appointment in 1 week time.    Pending laboratory work/Tests to be performed/followed by the Primary care Physician: None

## 2023-04-29 NOTE — NURSING
Dc instructions given verbally and copy sent with pt w/verbal understanding noted. Vss nad pt transported via wc

## 2023-05-01 ENCOUNTER — NURSE TRIAGE (OUTPATIENT)
Dept: ADMINISTRATIVE | Facility: CLINIC | Age: 63
End: 2023-05-01
Payer: MEDICARE

## 2023-05-01 ENCOUNTER — PATIENT OUTREACH (OUTPATIENT)
Dept: ADMINISTRATIVE | Facility: OTHER | Age: 63
End: 2023-05-01
Payer: MEDICARE

## 2023-05-01 NOTE — TELEPHONE ENCOUNTER
Pts friend calling with pt, states that pt received a call and they are returning that call. Advised I would get her info to correct team and to be looking for a call back from them. verbalized understanding. Denies any further questions or concerns at this time, advised to call back if they have any that come up. Advised pt to call back with any other concerns or worsening symptoms. Verbalized understanding and will route message to provider.     Reason for Disposition   Information only question and nurse able to answer    Protocols used: Information Only Call - No Triage-A-[place]

## 2023-05-02 ENCOUNTER — HOSPITAL ENCOUNTER (INPATIENT)
Facility: HOSPITAL | Age: 63
LOS: 3 days | Discharge: HOME | DRG: 065 | End: 2023-05-05
Attending: STUDENT IN AN ORGANIZED HEALTH CARE EDUCATION/TRAINING PROGRAM | Admitting: INTERNAL MEDICINE
Payer: MEDICARE

## 2023-05-02 DIAGNOSIS — I63.9 EMBOLIC STROKE: ICD-10-CM

## 2023-05-02 DIAGNOSIS — Z01.810 PREOP CARDIOVASCULAR EXAM: ICD-10-CM

## 2023-05-02 DIAGNOSIS — R53.1 RIGHT SIDED WEAKNESS: ICD-10-CM

## 2023-05-02 DIAGNOSIS — I63.9 STROKE: ICD-10-CM

## 2023-05-02 DIAGNOSIS — E78.2 MIXED HYPERLIPIDEMIA: ICD-10-CM

## 2023-05-02 DIAGNOSIS — I63.9 ISCHEMIC STROKE: Primary | ICD-10-CM

## 2023-05-02 DIAGNOSIS — I63.9 CVA (CEREBRAL VASCULAR ACCIDENT): ICD-10-CM

## 2023-05-02 DIAGNOSIS — I63.9 CEREBROVASCULAR ACCIDENT (CVA), UNSPECIFIED MECHANISM: ICD-10-CM

## 2023-05-02 PROBLEM — E11.21 TYPE 2 DIABETES MELLITUS WITH DIABETIC NEPHROPATHY: Chronic | Status: ACTIVE | Noted: 2018-07-19

## 2023-05-02 PROBLEM — Z86.73 HISTORY OF STROKE: Chronic | Status: ACTIVE | Noted: 2023-05-02

## 2023-05-02 PROBLEM — N18.30 CKD (CHRONIC KIDNEY DISEASE), STAGE III: Chronic | Status: ACTIVE | Noted: 2023-05-02

## 2023-05-02 LAB
ALBUMIN SERPL BCP-MCNC: 3.7 G/DL (ref 3.5–5.2)
ALP SERPL-CCNC: 71 U/L (ref 55–135)
ALT SERPL W/O P-5'-P-CCNC: 24 U/L (ref 10–44)
ANION GAP SERPL CALC-SCNC: 8 MMOL/L (ref 8–16)
AST SERPL-CCNC: 24 U/L (ref 10–40)
BASOPHILS # BLD AUTO: 0.05 K/UL (ref 0–0.2)
BASOPHILS NFR BLD: 0.5 % (ref 0–1.9)
BILIRUB SERPL-MCNC: 0.7 MG/DL (ref 0.1–1)
BUN SERPL-MCNC: 27 MG/DL (ref 8–23)
CALCIUM SERPL-MCNC: 9.2 MG/DL (ref 8.7–10.5)
CHLORIDE SERPL-SCNC: 108 MMOL/L (ref 95–110)
CHOLEST SERPL-MCNC: 157 MG/DL (ref 120–199)
CHOLEST/HDLC SERPL: 2.2 {RATIO} (ref 2–5)
CO2 SERPL-SCNC: 20 MMOL/L (ref 23–29)
CREAT SERPL-MCNC: 1.2 MG/DL (ref 0.5–1.4)
CREAT SERPL-MCNC: 1.2 MG/DL (ref 0.5–1.4)
DIFFERENTIAL METHOD: ABNORMAL
EOSINOPHIL # BLD AUTO: 0.1 K/UL (ref 0–0.5)
EOSINOPHIL NFR BLD: 1.2 % (ref 0–8)
ERYTHROCYTE [DISTWIDTH] IN BLOOD BY AUTOMATED COUNT: 15.2 % (ref 11.5–14.5)
EST. GFR  (NO RACE VARIABLE): 50.2 ML/MIN/1.73 M^2
GLUCOSE SERPL-MCNC: 121 MG/DL (ref 70–110)
GLUCOSE SERPL-MCNC: 131 MG/DL (ref 70–110)
GLUCOSE SERPL-MCNC: 95 MG/DL (ref 70–110)
HCT VFR BLD AUTO: 35.9 % (ref 37–48.5)
HDLC SERPL-MCNC: 73 MG/DL (ref 40–75)
HDLC SERPL: 46.5 % (ref 20–50)
HGB BLD-MCNC: 12.6 G/DL (ref 12–16)
IMM GRANULOCYTES # BLD AUTO: 0.04 K/UL (ref 0–0.04)
IMM GRANULOCYTES NFR BLD AUTO: 0.4 % (ref 0–0.5)
INR PPP: 1 (ref 0.8–1.2)
LDLC SERPL CALC-MCNC: 65.4 MG/DL (ref 63–159)
LYMPHOCYTES # BLD AUTO: 2.7 K/UL (ref 1–4.8)
LYMPHOCYTES NFR BLD: 25.6 % (ref 18–48)
MCH RBC QN AUTO: 27.8 PG (ref 27–31)
MCHC RBC AUTO-ENTMCNC: 35.1 G/DL (ref 32–36)
MCV RBC AUTO: 79 FL (ref 82–98)
MONOCYTES # BLD AUTO: 1 K/UL (ref 0.3–1)
MONOCYTES NFR BLD: 9.8 % (ref 4–15)
NEUTROPHILS # BLD AUTO: 6.5 K/UL (ref 1.8–7.7)
NEUTROPHILS NFR BLD: 62.5 % (ref 38–73)
NONHDLC SERPL-MCNC: 84 MG/DL
NRBC BLD-RTO: 0 /100 WBC
PLATELET # BLD AUTO: 433 K/UL (ref 150–450)
PMV BLD AUTO: 10.3 FL (ref 9.2–12.9)
POTASSIUM SERPL-SCNC: 4.6 MMOL/L (ref 3.5–5.1)
PROT SERPL-MCNC: 7.6 G/DL (ref 6–8.4)
PROTHROMBIN TIME: 10.4 SEC (ref 9–12.5)
RBC # BLD AUTO: 4.54 M/UL (ref 4–5.4)
SAMPLE: NORMAL
SODIUM SERPL-SCNC: 136 MMOL/L (ref 136–145)
TRIGL SERPL-MCNC: 93 MG/DL (ref 30–150)
TROPONIN I SERPL HS-MCNC: 2.6 PG/ML (ref 0–14.9)
TSH SERPL DL<=0.005 MIU/L-ACNC: 4.18 UIU/ML (ref 0.34–5.6)
WBC # BLD AUTO: 10.47 K/UL (ref 3.9–12.7)

## 2023-05-02 PROCEDURE — 99285 EMERGENCY DEPT VISIT HI MDM: CPT | Mod: 25

## 2023-05-02 PROCEDURE — 36415 COLL VENOUS BLD VENIPUNCTURE: CPT | Performed by: INTERNAL MEDICINE

## 2023-05-02 PROCEDURE — 84484 ASSAY OF TROPONIN QUANT: CPT | Performed by: STUDENT IN AN ORGANIZED HEALTH CARE EDUCATION/TRAINING PROGRAM

## 2023-05-02 PROCEDURE — 84443 ASSAY THYROID STIM HORMONE: CPT | Performed by: STUDENT IN AN ORGANIZED HEALTH CARE EDUCATION/TRAINING PROGRAM

## 2023-05-02 PROCEDURE — 80061 LIPID PANEL: CPT | Performed by: STUDENT IN AN ORGANIZED HEALTH CARE EDUCATION/TRAINING PROGRAM

## 2023-05-02 PROCEDURE — 21400001 HC TELEMETRY ROOM

## 2023-05-02 PROCEDURE — 93010 ELECTROCARDIOGRAM REPORT: CPT | Mod: ,,, | Performed by: INTERNAL MEDICINE

## 2023-05-02 PROCEDURE — 80053 COMPREHEN METABOLIC PANEL: CPT | Performed by: STUDENT IN AN ORGANIZED HEALTH CARE EDUCATION/TRAINING PROGRAM

## 2023-05-02 PROCEDURE — 25000003 PHARM REV CODE 250: Performed by: INTERNAL MEDICINE

## 2023-05-02 PROCEDURE — 85025 COMPLETE CBC W/AUTO DIFF WBC: CPT | Performed by: INTERNAL MEDICINE

## 2023-05-02 PROCEDURE — 93005 ELECTROCARDIOGRAM TRACING: CPT | Performed by: INTERNAL MEDICINE

## 2023-05-02 PROCEDURE — 99447 PR INTERPROF, PHONE/INTERNET/EHR, CONSULT, 11-20 MINS: ICD-10-PCS | Mod: ,,, | Performed by: PSYCHIATRY & NEUROLOGY

## 2023-05-02 PROCEDURE — 93010 EKG 12-LEAD: ICD-10-PCS | Mod: ,,, | Performed by: INTERNAL MEDICINE

## 2023-05-02 PROCEDURE — 83880 ASSAY OF NATRIURETIC PEPTIDE: CPT | Performed by: STUDENT IN AN ORGANIZED HEALTH CARE EDUCATION/TRAINING PROGRAM

## 2023-05-02 PROCEDURE — 99447 NTRPROF PH1/NTRNET/EHR 11-20: CPT | Mod: ,,, | Performed by: PSYCHIATRY & NEUROLOGY

## 2023-05-02 PROCEDURE — 85610 PROTHROMBIN TIME: CPT | Performed by: STUDENT IN AN ORGANIZED HEALTH CARE EDUCATION/TRAINING PROGRAM

## 2023-05-02 PROCEDURE — 25500020 PHARM REV CODE 255: Performed by: STUDENT IN AN ORGANIZED HEALTH CARE EDUCATION/TRAINING PROGRAM

## 2023-05-02 RX ORDER — DEXAMETHASONE 0.7 MG/1
0.7 IMPLANT INTRAVITREAL ONCE
Status: ON HOLD | COMMUNITY
End: 2023-07-05 | Stop reason: HOSPADM

## 2023-05-02 RX ORDER — CLONIDINE 0.1 MG/24H
1 PATCH, EXTENDED RELEASE TRANSDERMAL
Status: DISCONTINUED | OUTPATIENT
Start: 2023-05-02 | End: 2023-05-05 | Stop reason: HOSPADM

## 2023-05-02 RX ORDER — INSULIN ASPART 100 [IU]/ML
INJECTION, SOLUTION INTRAVENOUS; SUBCUTANEOUS
COMMUNITY
End: 2023-05-12 | Stop reason: SDUPTHER

## 2023-05-02 RX ORDER — AMOXICILLIN 250 MG
2 CAPSULE ORAL 2 TIMES DAILY PRN
Status: DISCONTINUED | OUTPATIENT
Start: 2023-05-02 | End: 2023-05-05 | Stop reason: HOSPADM

## 2023-05-02 RX ORDER — ENOXAPARIN SODIUM 100 MG/ML
40 INJECTION SUBCUTANEOUS EVERY 24 HOURS
Status: DISCONTINUED | OUTPATIENT
Start: 2023-05-03 | End: 2023-05-05 | Stop reason: HOSPADM

## 2023-05-02 RX ORDER — SODIUM CHLORIDE 0.9 % (FLUSH) 0.9 %
10 SYRINGE (ML) INJECTION
Status: DISCONTINUED | OUTPATIENT
Start: 2023-05-02 | End: 2023-05-05 | Stop reason: HOSPADM

## 2023-05-02 RX ORDER — LABETALOL HYDROCHLORIDE 5 MG/ML
10 INJECTION, SOLUTION INTRAVENOUS
Status: DISCONTINUED | OUTPATIENT
Start: 2023-05-02 | End: 2023-05-05 | Stop reason: HOSPADM

## 2023-05-02 RX ORDER — CLOPIDOGREL BISULFATE 75 MG/1
75 TABLET ORAL DAILY
Status: DISCONTINUED | OUTPATIENT
Start: 2023-05-03 | End: 2023-05-03

## 2023-05-02 RX ORDER — METOPROLOL SUCCINATE 50 MG/1
100 TABLET, EXTENDED RELEASE ORAL DAILY
Status: DISCONTINUED | OUTPATIENT
Start: 2023-05-03 | End: 2023-05-05 | Stop reason: HOSPADM

## 2023-05-02 RX ORDER — IPRATROPIUM BROMIDE 0.5 MG/2.5ML
0.5 SOLUTION RESPIRATORY (INHALATION) EVERY 6 HOURS
Status: DISCONTINUED | OUTPATIENT
Start: 2023-05-03 | End: 2023-05-05 | Stop reason: HOSPADM

## 2023-05-02 RX ORDER — ACETAMINOPHEN 325 MG/1
650 TABLET ORAL EVERY 6 HOURS PRN
Status: DISCONTINUED | OUTPATIENT
Start: 2023-05-02 | End: 2023-05-05 | Stop reason: HOSPADM

## 2023-05-02 RX ORDER — INSULIN ASPART 100 [IU]/ML
1-10 INJECTION, SOLUTION INTRAVENOUS; SUBCUTANEOUS EVERY 6 HOURS PRN
Status: DISCONTINUED | OUTPATIENT
Start: 2023-05-02 | End: 2023-05-05 | Stop reason: HOSPADM

## 2023-05-02 RX ORDER — NAPROXEN SODIUM 220 MG/1
81 TABLET, FILM COATED ORAL DAILY
Status: DISCONTINUED | OUTPATIENT
Start: 2023-05-03 | End: 2023-05-05 | Stop reason: HOSPADM

## 2023-05-02 RX ORDER — GLUCAGON 1 MG
1 KIT INJECTION
Status: DISCONTINUED | OUTPATIENT
Start: 2023-05-02 | End: 2023-05-05 | Stop reason: HOSPADM

## 2023-05-02 RX ORDER — CLONIDINE 0.1 MG/24H
1 PATCH, EXTENDED RELEASE TRANSDERMAL
Status: DISCONTINUED | OUTPATIENT
Start: 2023-05-03 | End: 2023-05-02

## 2023-05-02 RX ORDER — BRIMONIDINE TARTRATE 2 MG/ML
1 SOLUTION/ DROPS OPHTHALMIC EVERY 8 HOURS
COMMUNITY
End: 2023-05-02 | Stop reason: CLARIF

## 2023-05-02 RX ORDER — ONDANSETRON 2 MG/ML
4 INJECTION INTRAMUSCULAR; INTRAVENOUS EVERY 8 HOURS PRN
Status: DISCONTINUED | OUTPATIENT
Start: 2023-05-02 | End: 2023-05-05 | Stop reason: HOSPADM

## 2023-05-02 RX ORDER — ATORVASTATIN CALCIUM 40 MG/1
40 TABLET, FILM COATED ORAL NIGHTLY
Status: DISCONTINUED | OUTPATIENT
Start: 2023-05-02 | End: 2023-05-05 | Stop reason: HOSPADM

## 2023-05-02 RX ADMIN — CLONIDINE 1 PATCH: 0.1 PATCH TRANSDERMAL at 11:05

## 2023-05-02 RX ADMIN — IOHEXOL 100 ML: 350 INJECTION, SOLUTION INTRAVENOUS at 05:05

## 2023-05-02 NOTE — Clinical Note
Diagnosis: Cerebrovascular accident (CVA), unspecified mechanism [[id number]]   Future Attending Provider: TORRES HERNANDEZ [[id number]]   Admitting Provider:: TORRES HERNANDEZ [[id number]]

## 2023-05-02 NOTE — SUBJECTIVE & OBJECTIVE
"  Woke up with symptoms?: no    Recent bleeding noted: no  Does the patient take any Blood Thinners? no  Medications: Antiplatelets:  aspirin and clopidogrel/Plavix      Past Medical History:   Past Medical History:   Diagnosis Date    Arthritis     Complete tear of left rotator cuff 11/09/2017    COPD (chronic obstructive pulmonary disease)     COVID-19 infection 07/02/2021    Diabetes mellitus     H/o Cerebrovascular accident (CVA) of left pontine structure 12/12/2019    Hypertension     Personal history of colonic polyps     Stroke     Wears glasses        Past Surgical History: no major surgeries within the last 2 weeks    Family History: no relevant history    Social History: no smoking, no drinking, no drugs    Allergies: No Known Allergies No relevant allergies    Review of Systems   Constitutional: Negative for appetite change.   HENT: Negative for congestion.    Eyes: Negative for discharge.   Respiratory: Negative for shortness of breath.    Cardiovascular: Negative for chest pain.   Gastrointestinal: Negative for abdominal pain.   Endocrine: Negative for cold intolerance.   Genitourinary: Negative for difficulty urinating.   Musculoskeletal: Negative for joint swelling.   Skin: Negative for color change.     Objective:   Vitals: Blood pressure (!) 201/96, pulse 65, temperature 98.2 °F (36.8 °C), temperature source Oral, resp. rate 16, height 4' 11" (1.499 m), weight 59 kg (130 lb), SpO2 98 %.     CT READ: {TELESTROKE CT READ:74205}    Physical Exam  Constitutional:       General: She is not in acute distress.  HENT:      Head: Normocephalic.      Right Ear: External ear normal.      Left Ear: External ear normal.   Eyes:      Conjunctiva/sclera: Conjunctivae normal.   Pulmonary:      Effort: Pulmonary effort is normal.      Breath sounds: No stridor.   Abdominal:      Tenderness: There is no abdominal tenderness.   Musculoskeletal:      Cervical back: Normal range of motion.   Skin:     General: " Skin is dry.      Findings: No rash.

## 2023-05-02 NOTE — ED PROVIDER NOTES
Encounter Date: 5/2/2023       History     Chief Complaint   Patient presents with    Extremity Weakness     Right side weakness starting yesterday 1130,  states it went away and came back today after lunch.       HPI    62 yo woman hx of DM, recent CVA (admitted 4/26/23), HTN presents for evaluation of right sided weakness that initially occurred yesterday, resolved and then recurred today, last known normal 1230 today. Denies chest pain, SOB. History limited by patient condition. Taken emergently to CT.    Review of patient's allergies indicates:  No Known Allergies  Past Medical History:   Diagnosis Date    Arthritis     Complete tear of left rotator cuff 11/09/2017    COPD (chronic obstructive pulmonary disease)     COVID-19 infection 07/02/2021    Diabetes mellitus     H/o Cerebrovascular accident (CVA) of left pontine structure 12/12/2019    Hypertension     Personal history of colonic polyps     Stroke     Wears glasses      Past Surgical History:   Procedure Laterality Date    COLONOSCOPY N/A 4/11/2017    Procedure: COLONOSCOPY;  Surgeon: Jared Antonio MD;  Location: [facility];  Service: Endoscopy;  Laterality: N/A;    HYSTERECTOMY      OOPHORECTOMY      SHOULDER SURGERY      R     Family History   Problem Relation Age of Onset    Diabetes Mother     Asthma Mother     Hypertension Mother     Diabetes Father     Diabetes Brother     Hypertension Brother     Anemia Daughter     Glaucoma Neg Hx     Macular degeneration Neg Hx     Retinal detachment Neg Hx      Social History     Tobacco Use    Smoking status: Never    Smokeless tobacco: Never   Substance Use Topics    Alcohol use: No    Drug use: No     Review of Systems   Unable to perform ROS: Acuity of condition   Cardiovascular:  Negative for chest pain.   Neurological:  Positive for weakness.     Physical Exam     Initial Vitals [05/02/23 1700]   BP Pulse Resp Temp SpO2   (!) 201/96 65 16 98.2 °F (36.8 °C) 98 %      MAP       --         Physical  Exam    Nursing note and vitals reviewed.  Constitutional: She is not diaphoretic.   HENT:   Head: Normocephalic and atraumatic.   Eyes: EOM are normal. Pupils are equal, round, and reactive to light. Right eye exhibits no discharge. Left eye exhibits no discharge.   Neck: No tracheal deviation present.   Cardiovascular:  Normal rate and regular rhythm.           Pulmonary/Chest: Breath sounds normal. No stridor. No respiratory distress.   Abdominal: Abdomen is soft. There is no abdominal tenderness.   Musculoskeletal:         General: No tenderness.     Neurological: She is alert.   Left sided facial droop, slurred speech present, Right arm drift present   Skin: Skin is warm and dry.       ED Course   Procedures  Labs Reviewed   COMPREHENSIVE METABOLIC PANEL - Abnormal; Notable for the following components:       Result Value    CO2 20 (*)     Glucose 121 (*)     BUN 27 (*)     eGFR 50.2 (*)     All other components within normal limits   POCT GLUCOSE - Abnormal; Notable for the following components:    POC Glucose 131 (*)     All other components within normal limits   PROTIME-INR   LIPID PANEL   TROPONIN I HIGH SENSITIVITY   CBC W/ AUTO DIFFERENTIAL   TSH   B-TYPE NATRIURETIC PEPTIDE   URINALYSIS, REFLEX TO URINE CULTURE   ISTAT CREATININE   POCT GLUCOSE MONITORING CONTINUOUS        ECG Results              ECG 12 lead (Preliminary result)  Result time 05/02/23 20:07:57      Wet Read by Jennifer Kohil MD (05/02/23 20:07:57, [facility] - Emergency Dept, Emergency Medicine)    EKG normal sinus rhythm at 65 beats per minute, does have some T-wave flattening in leads 1 2 aVL 3 and AVF unchanged from prior.                                  Imaging Results              MRI Brain Without Contrast (Final result)  Result time 05/02/23 22:59:18      Final result by Elieser Preciado MD (05/02/23 22:59:18)                   Narrative:    MR BRAIN WITHOUT IV CONTRAST    Comparisons: Brain MRI with and  without contrast dated April 27, 2023    Additional pertinent history: Stroke    TECHNIQUE:  Multiplanar, multisequence brain MRI was performed without gadolinium contrast.    FINDINGS:    Sagittal midline structures and craniocervical junction: Negative.    Midline shift: None.    Ventricles: Negative.    Brain parenchyma:  Diffusion weighted imaging: Tiny region of restricted diffusion involving the posterior aspects of the right midbrain as well as a linear region of restricted diffusion within the mid left temporal lobe.  Gradient sequence: Continued findings of multiple foci of magnetic susceptibility within the brain parenchyma in a distribution similar to the previous exam with differential considerations including chronic small microvascular hemorrhages related to hypertensive episodes versus amyloid angiopathy.  T2 weighted FLAIR images: Patchy hypoattenuation within the periventricular and subcortical white matter, nonspecific but likely representing small vessel ischemic change on a chronic basis.    Extra-axial spaces: Mild cerebral atrophy.    Dural venous sinuses and major arterial flow voids: Negative.    Mastoid air cells and paranasal sinuses: Negative.    Surrounding soft tissues and orbits: Negative.    Impression:  1. Superimposed upon chronic age-related changes and multiple regions of either chronic microhemorrhage or amyloid angiopathy are findings of 2 small regions of acute infarct within the right posterior midbrain in the mid left temporal lobe.  2. Other age related changes as described above.    Electronically signed by:  Elieser Preciado MD  5/2/2023 10:59 PM CDT Workstation: [id number]                                     X-Ray Chest AP Portable (Final result)  Result time 05/02/23 19:05:01      Final result by Otilio Frausto MD (05/02/23 19:05:01)                   Narrative:      EXAM: XR CHEST AP PORTABLE    HISTORY: Stroke. Possible aspiration.    COMPARISON:Chest x-ray dated  4/26/2023    FINDINGS: The lungs are somewhat poorly inflated. There is mild linear atelectasis in the left midlung zone. No acute focal infiltrates are identified. There are no pleural effusions. The cardiomediastinal silhouette is unremarkable.    IMPRESSION:   Poor lung expansion. No evidence of acute disease within the chest.    Electronically signed by:  Haresh Frausto MD  5/2/2023 7:05 PM CDT Workstation: [id number]                                     CTA Head and Neck (xpd) (Final result)  Result time 05/02/23 18:11:30      Final result by Otilio Frausto MD (05/02/23 18:11:30)                   Narrative:      EXAM: CTA HEAD AND NECK (XPD)    HISTORY: Stroke/TIA, determine embolic source    COMPARISON: CTA of the head and neck dated 4/27/2023. MRI of the brain dated 4/27/2023.    TECHNIQUE: Multiple axial 1 mm thick images were obtained through the neck and head during rapid IV injection of contrast. Coronal and sagittal reconstructions were produced.    This exam was performed according to our departmental dose-optimization program, which includes automated exposure control, adjustment of the mA and/or kV according to patient size and/or use of iterative reconstruction technique.    Unless otherwise stated, incidental findings do not require dedicated follow-up imaging.    FINDINGS: There is normal branching of the great vessels from the aortic arch that all appear widely patent. No stenoses are evident within either common or external or internal carotid artery within the neck. The carotid bifurcations are normal. The vertebral arteries are codominant and are both widely patent throughout their course neck and head. No atherosclerotic disease is evident within the intracranial arterial vasculature. There are no significant stenoses or focal intracranial occlusions. No intentional embolic source is demonstrated. Note that review of the patient's MRI shows evidence of extensive small vessel chronic  ischemic change and also extensive amyloid angiopathy which likely accounts for the patient's infarcts. The overall appearance is not typical of embolic infarcts. That study showed a punctate acute ischemic infarct in the superior aspect of the midbrain to the right of midline that is quite likely not be embolic in etiology. There are no aneurysms. Normal enhancement is observed in the dural venous sinuses. There is no abnormal enhancement within the brain parenchyma or brainstem.    IMPRESSION:   Unremarkable CT angiography imaging of the neck and head. There is essentially no evidence of atherosclerotic disease. There are no significant stenoses or focal occlusions within the arterial vasculature of the neck or head. See above discussion.    Electronically signed by:  Haresh Frausto MD  5/2/2023 6:11 PM CDT Workstation: [id number]                                     CT HEAD FOR STROKE (Final result)  Result time 05/02/23 17:58:00   Procedure changed from CT Head Without Contrast     Final result by Otilio Frausto MD (05/02/23 17:58:00)                   Narrative:      EXAM: CT HEAD FOR STROKE    HISTORY: Neuro deficit, acute, stroke suspected    COMPARISON: CT scan of the head dated 4/26/2023    TECHNIQUE: Multiple axial 3.0 mm thick images were acquired from the base of the skull to the vertex without IV contrast.    This exam was performed according to our departmental dose-optimization program, which includes automated exposure control, adjustment of the mA and/or kV according to patient size and/or use of iterative reconstruction technique.    Unless otherwise stated, incidental findings do not require dedicated follow-up imaging.    FINDINGS: There is patchy ill-defined abnormal diminished attenuation throughout the white matter of both cerebral hemispheres that appears similar on the previous CT scan and is consistent with sequela of extensive small vessel chronic ischemic change. No definite acute  infarcts are identified. There is no acute intracranial hemorrhage. There is a small chronic infarct in the medial aspect of the left frontal lobe in the vascular territory of the left anterior cerebral artery that is unchanged. There is also small chronic infarct in the posterior aspect of the regina that is unchanged. This was better shown on the MRI study dated 4/27/2023 which also showed a punctate acute lacunar infarct in the far superior aspect of the midbrain on the right. There are no discrete intracranial masses. The paranasal sinuses and mastoid air cells and middle ear cavities are well aerated.    IMPRESSION:   Evidence of extensive small vessel chronic ischemic change as described. Small chronic lacunar infarct in the posterior aspect of the regina. No acute intracranial abnormality is identified.    Electronically signed by:  Haresh Frausto MD  5/2/2023 5:58 PM CDT Workstation: [id number]                                     Medications   aspirin chewable tablet 81 mg (has no administration in time range)   atorvastatin tablet 40 mg (has no administration in time range)   clopidogreL tablet 75 mg (has no administration in time range)   insulin detemir U-100 (Levemir) pen 40 Units (has no administration in time range)   metoprolol succinate (TOPROL-XL) 24 hr tablet 100 mg (has no administration in time range)   cloNIDine 0.1 mg/24 hr td ptwk 1 patch (has no administration in time range)   sodium chloride 0.9% flush 10 mL (has no administration in time range)   labetaloL injection 10 mg (has no administration in time range)   enoxaparin injection 40 mg (has no administration in time range)   senna-docusate 8.6-50 mg per tablet 2 tablet (has no administration in time range)   ondansetron injection 4 mg (has no administration in time range)   dextrose 50% injection 12.5 g (has no administration in time range)   glucagon (human recombinant) injection 1 mg (has no administration in time range)   insulin aspart  U-100 pen 1-10 Units (has no administration in time range)   acetaminophen tablet 650 mg (has no administration in time range)   ipratropium 0.02 % nebulizer solution 0.5 mg (has no administration in time range)   iohexoL (OMNIPAQUE 350) injection 100 mL (100 mLs Intravenous Given 5/2/23 1746)     Medical Decision Making:   History:   I obtained history from: someone other than patient.       <> Summary of History:  at bedside provides history  Old Medical Records: I decided to obtain old medical records.  Old Records Summarized: records from previous admission(s).       <> Summary of Records: Stroke admit end of April  Initial Assessment:   Weakness, facial droop, HTN, right arm drift, facial droop present, outside window for TPA, also with recent stroke (End of April) absolute contraindication to tpa  Differential Diagnosis:   CVA, HTN emergency, acs, chf, metabolic derangement, endocrine derangement  Independently Interpreted Test(s):   I have ordered and independently interpreted X-rays - see prior notes.  I have ordered and independently interpreted EKG Reading(s) - see prior notes  Clinical Tests:   Lab Tests: Ordered and Reviewed  Radiological Study: Ordered and Reviewed  Medical Tests: Ordered and Reviewed  ED Management:  Stroke code, see ed course for neuro discussion, will permissive htn, plan to admit           ED Course as of 05/02/23 2317   Tue May 02, 2023   1745 POC Glucose(!): 131 [CH]   1754 Per telestroke, CTH and CTA H&N with no LVO or acute bleed.  Recommend continuing DAPT started last admission. Agree not TPA candidate.  Would admit for repeat MRI, permissive hypertension.  [CH]   1852 INR 1.0 [CH]      ED Course User Index  [CH] Jennifer Kohli MD                   Clinical Impression:   Final diagnoses:  [I63.9] Cerebrovascular accident (CVA), unspecified mechanism (Primary)  [R53.1] Right sided weakness        ED Disposition Condition    Admit                 Mert Cunningham  MD [facility]  Resident  05/02/23 [id number]

## 2023-05-02 NOTE — CONSULTS
Telestroke Consultation converted to phone consult due to Technical difficulties experienced with spoke cart either offline or multiple failed attempts at connection . Discussed w/ Dr. Kohli @ [facility].   Total time spent on encounter including chart review, discussing of imaging, triage & evaluation and planning for diagnostics and management ~ 10-15 minutes, >50% of this time was spent communicating with consulting provider.    63F presented to ED today, had some facial weakness and R UE weakness that started yesterday, got better, then started again today around 1230. Currently of window.    Chart checked, recent admit with R medial midbrain infarct.   Currently on DAPT.  CTA reviewed, negative for LVO.  Will be admitted for MRI brain w/o to evaluate for new infarct given the new symptoms different from prior making recrudescence less likely.  Permissive HTN < 220/120 mmHg.  Continue DAPT.

## 2023-05-03 ENCOUNTER — ANESTHESIA EVENT (OUTPATIENT)
Dept: CARDIOLOGY | Facility: HOSPITAL | Age: 63
DRG: 065 | End: 2023-05-03
Payer: MEDICARE

## 2023-05-03 LAB
ANION GAP SERPL CALC-SCNC: 7 MMOL/L (ref 8–16)
BUN SERPL-MCNC: 23 MG/DL (ref 8–23)
CALCIUM SERPL-MCNC: 9.1 MG/DL (ref 8.7–10.5)
CHLORIDE SERPL-SCNC: 111 MMOL/L (ref 95–110)
CO2 SERPL-SCNC: 21 MMOL/L (ref 23–29)
CREAT SERPL-MCNC: 1 MG/DL (ref 0.5–1.4)
ERYTHROCYTE [DISTWIDTH] IN BLOOD BY AUTOMATED COUNT: 15.2 % (ref 11.5–14.5)
EST. GFR  (NO RACE VARIABLE): >60 ML/MIN/1.73 M^2
ESTIMATED AVG GLUCOSE: 246 MG/DL (ref 68–131)
GLUCOSE SERPL-MCNC: 101 MG/DL (ref 70–110)
GLUCOSE SERPL-MCNC: 148 MG/DL (ref 70–110)
GLUCOSE SERPL-MCNC: 251 MG/DL (ref 70–110)
GLUCOSE SERPL-MCNC: 323 MG/DL (ref 70–110)
GLUCOSE SERPL-MCNC: 91 MG/DL (ref 70–110)
HBA1C MFR BLD: 10.2 % (ref 4.5–6.2)
HCT VFR BLD AUTO: 35.9 % (ref 37–48.5)
HGB BLD-MCNC: 12.6 G/DL (ref 12–16)
MAGNESIUM SERPL-MCNC: 2 MG/DL (ref 1.6–2.6)
MCH RBC QN AUTO: 28 PG (ref 27–31)
MCHC RBC AUTO-ENTMCNC: 35.1 G/DL (ref 32–36)
MCV RBC AUTO: 80 FL (ref 82–98)
PHOSPHATE SERPL-MCNC: 3.7 MG/DL (ref 2.7–4.5)
PLATELET # BLD AUTO: 452 K/UL (ref 150–450)
PMV BLD AUTO: 10.8 FL (ref 9.2–12.9)
POTASSIUM SERPL-SCNC: 4 MMOL/L (ref 3.5–5.1)
RBC # BLD AUTO: 4.5 M/UL (ref 4–5.4)
SODIUM SERPL-SCNC: 139 MMOL/L (ref 136–145)
WBC # BLD AUTO: 10.38 K/UL (ref 3.9–12.7)

## 2023-05-03 PROCEDURE — 83036 HEMOGLOBIN GLYCOSYLATED A1C: CPT | Performed by: INTERNAL MEDICINE

## 2023-05-03 PROCEDURE — 97530 THERAPEUTIC ACTIVITIES: CPT

## 2023-05-03 PROCEDURE — 99223 PR INITIAL HOSPITAL CARE,LEVL III: ICD-10-PCS | Mod: ,,, | Performed by: GENERAL PRACTICE

## 2023-05-03 PROCEDURE — 97116 GAIT TRAINING THERAPY: CPT

## 2023-05-03 PROCEDURE — 92523 SPEECH SOUND LANG COMPREHEN: CPT

## 2023-05-03 PROCEDURE — 97535 SELF CARE MNGMENT TRAINING: CPT

## 2023-05-03 PROCEDURE — 94761 N-INVAS EAR/PLS OXIMETRY MLT: CPT

## 2023-05-03 PROCEDURE — 63600175 PHARM REV CODE 636 W HCPCS: Performed by: INTERNAL MEDICINE

## 2023-05-03 PROCEDURE — 92610 EVALUATE SWALLOWING FUNCTION: CPT

## 2023-05-03 PROCEDURE — 97162 PT EVAL MOD COMPLEX 30 MIN: CPT

## 2023-05-03 PROCEDURE — 85027 COMPLETE CBC AUTOMATED: CPT | Performed by: INTERNAL MEDICINE

## 2023-05-03 PROCEDURE — 94640 AIRWAY INHALATION TREATMENT: CPT

## 2023-05-03 PROCEDURE — 99223 1ST HOSP IP/OBS HIGH 75: CPT | Mod: ,,, | Performed by: GENERAL PRACTICE

## 2023-05-03 PROCEDURE — 80048 BASIC METABOLIC PNL TOTAL CA: CPT | Performed by: INTERNAL MEDICINE

## 2023-05-03 PROCEDURE — 21400001 HC TELEMETRY ROOM

## 2023-05-03 PROCEDURE — 25000003 PHARM REV CODE 250: Performed by: INTERNAL MEDICINE

## 2023-05-03 PROCEDURE — 83735 ASSAY OF MAGNESIUM: CPT | Performed by: INTERNAL MEDICINE

## 2023-05-03 PROCEDURE — 97166 OT EVAL MOD COMPLEX 45 MIN: CPT

## 2023-05-03 PROCEDURE — 25000242 PHARM REV CODE 250 ALT 637 W/ HCPCS: Performed by: INTERNAL MEDICINE

## 2023-05-03 PROCEDURE — 99900031 HC PATIENT EDUCATION (STAT)

## 2023-05-03 PROCEDURE — 84100 ASSAY OF PHOSPHORUS: CPT | Performed by: INTERNAL MEDICINE

## 2023-05-03 PROCEDURE — 36415 COLL VENOUS BLD VENIPUNCTURE: CPT | Performed by: INTERNAL MEDICINE

## 2023-05-03 PROCEDURE — 99900035 HC TECH TIME PER 15 MIN (STAT)

## 2023-05-03 RX ADMIN — INSULIN DETEMIR 40 UNITS: 100 INJECTION, SOLUTION SUBCUTANEOUS at 08:05

## 2023-05-03 RX ADMIN — INSULIN ASPART 6 UNITS: 100 INJECTION, SOLUTION INTRAVENOUS; SUBCUTANEOUS at 12:05

## 2023-05-03 RX ADMIN — IPRATROPIUM BROMIDE 0.5 MG: 0.5 SOLUTION RESPIRATORY (INHALATION) at 02:05

## 2023-05-03 RX ADMIN — ASPIRIN 81 MG 81 MG: 81 TABLET ORAL at 10:05

## 2023-05-03 RX ADMIN — IPRATROPIUM BROMIDE 0.5 MG: 0.5 SOLUTION RESPIRATORY (INHALATION) at 08:05

## 2023-05-03 RX ADMIN — INSULIN ASPART 4 UNITS: 100 INJECTION, SOLUTION INTRAVENOUS; SUBCUTANEOUS at 08:05

## 2023-05-03 RX ADMIN — ENOXAPARIN SODIUM 40 MG: 100 INJECTION SUBCUTANEOUS at 10:05

## 2023-05-03 RX ADMIN — METOPROLOL SUCCINATE 100 MG: 50 TABLET, FILM COATED, EXTENDED RELEASE ORAL at 10:05

## 2023-05-03 RX ADMIN — CLOPIDOGREL BISULFATE 75 MG: 75 TABLET, FILM COATED ORAL at 10:05

## 2023-05-03 RX ADMIN — IPRATROPIUM BROMIDE 0.5 MG: 0.5 SOLUTION RESPIRATORY (INHALATION) at 07:05

## 2023-05-03 RX ADMIN — ACETAMINOPHEN 650 MG: 325 TABLET ORAL at 10:05

## 2023-05-03 RX ADMIN — ATORVASTATIN CALCIUM 40 MG: 40 TABLET, FILM COATED ORAL at 08:05

## 2023-05-03 NOTE — PT/OT/SLP EVAL
Speech Language Pathology Evaluation  Cognitive/Bedside Swallow    Patient Name:  Steff Johnson   MRN:  [id number]  Admitting Diagnosis: Hypertensive emergency    Recommendations:                  General Recommendations:  Cognitive-linguistic therapy  Diet recommendations:  Regular Diet - IDDSI Level 7, Thin liquids - IDDSI Level 0   Aspiration Precautions: 1 bite/sip at a time, Assistance with meals, Feed only when awake/alert, and HOB to 90 degrees   General Precautions: Standard,    Communication strategies:  provide increased time to answer and go to room if call light pushed    History:   Per H&P:    HPI: Ms. Johnson is a 63-year-old right-handed female who was brought to the ED due to neurological symptoms.  Patient is a poor historian, only verbalized few words during my encounter, collateral from family present at bedside as well as ED provider.  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  As per history, noted to have left facial droop with right upper extremity weakness, slurred speech with word-finding difficulties, reported initial episode started yesterday but somewhat improved, reoccurred around lunchtime (12:30 pm) today which prompted ED presentation.  Patient denies headache, swallowing difficulties, does reports vision abnormalities but unclear if new or chronic, does report weakness right upper extremity, she lives with her father at home who assist her, reports did take all medications this morning. Family is concerned about high salt diet. Patient is a non smoker.  BP on presentation 201/96.  CBC not available, discussed with lab, needs to be recollected, communicated with nursing.  Sodium 136, BUN/creatinine 27/1.2, INR 1, LDL 65, troponin 2.6, glucose 131.  Chest x-ray poor expansion  with no acute abnormalities, CT head no acute hemorrhage, CTA no large vessel occlusion.  She was evaluated by tele stroke, Dr Goodman, out of window for tPA, MRI brain without contrast, continuing dual antiplatelet therapy, allowing for permissive hypertension up to 210/120.  Case discussed with ED provider who requested admission.  Plan of care was discussed with family members present at bedside.  Past Medical History:   Diagnosis Date    Arthritis     Complete tear of left rotator cuff 11/09/2017    COPD (chronic obstructive pulmonary disease)     COVID-19 infection 07/02/2021    Diabetes mellitus     H/o Cerebrovascular accident (CVA) of left pontine structure 12/12/2019    Hypertension     Personal history of colonic polyps     Stroke     Wears glasses        Past Surgical History:   Procedure Laterality Date    COLONOSCOPY N/A 4/11/2017    Procedure: COLONOSCOPY;  Surgeon: Jared Antonio MD;  Location: [facility];  Service: Endoscopy;  Laterality: N/A;    HYSTERECTOMY      OOPHORECTOMY      SHOULDER SURGERY      R       Social History: Patient lives with her fiance'.    Prior Intubation HX:  none this admit    Modified Barium Swallow: none found in epic or reported by pt      Chest X-Rays:   Imaging Results              MRI Brain Without Contrast (Final result)  Result time 05/02/23 22:59:18      Final result by Eliesre Preciado MD (05/02/23 22:59:18)                   Narrative:    MR BRAIN WITHOUT IV CONTRAST    Comparisons: Brain MRI with and without contrast dated April 27, 2023    Additional pertinent history: Stroke    TECHNIQUE:  Multiplanar, multisequence brain MRI was performed without gadolinium contrast.    FINDINGS:    Sagittal midline structures and craniocervical junction: Negative.    Midline shift: None.    Ventricles: Negative.    Brain parenchyma:  Diffusion weighted imaging: Tiny region of restricted diffusion involving the posterior aspects of the right midbrain as well as a linear region  of restricted diffusion within the mid left temporal lobe.  Gradient sequence: Continued findings of multiple foci of magnetic susceptibility within the brain parenchyma in a distribution similar to the previous exam with differential considerations including chronic small microvascular hemorrhages related to hypertensive episodes versus amyloid angiopathy.  T2 weighted FLAIR images: Patchy hypoattenuation within the periventricular and subcortical white matter, nonspecific but likely representing small vessel ischemic change on a chronic basis.    Extra-axial spaces: Mild cerebral atrophy.    Dural venous sinuses and major arterial flow voids: Negative.    Mastoid air cells and paranasal sinuses: Negative.    Surrounding soft tissues and orbits: Negative.    Impression:  1. Superimposed upon chronic age-related changes and multiple regions of either chronic microhemorrhage or amyloid angiopathy are findings of 2 small regions of acute infarct within the right posterior midbrain in the mid left temporal lobe.  2. Other age related changes as described above.    Electronically signed by:  Elieser Preciado MD  5/2/2023 10:59 PM CDT Workstation: [id number]                                     X-Ray Chest AP Portable (Final result)  Result time 05/02/23 19:05:01      Final result by Otilio Frausto MD (05/02/23 19:05:01)                   Narrative:      EXAM: XR CHEST AP PORTABLE    HISTORY: Stroke. Possible aspiration.    COMPARISON:Chest x-ray dated 4/26/2023    FINDINGS: The lungs are somewhat poorly inflated. There is mild linear atelectasis in the left midlung zone. No acute focal infiltrates are identified. There are no pleural effusions. The cardiomediastinal silhouette is unremarkable.    IMPRESSION:   Poor lung expansion. No evidence of acute disease within the chest.    Electronically signed by:  Haresh Frausto MD  5/2/2023 7:05 PM CDT Workstation: [id number]                                     CTA Head and  Neck (xpd) (Final result)  Result time 05/02/23 18:11:30      Final result by Otilio Frasuto MD (05/02/23 18:11:30)                   Narrative:      EXAM: CTA HEAD AND NECK (XPD)    HISTORY: Stroke/TIA, determine embolic source    COMPARISON: CTA of the head and neck dated 4/27/2023. MRI of the brain dated 4/27/2023.    TECHNIQUE: Multiple axial 1 mm thick images were obtained through the neck and head during rapid IV injection of contrast. Coronal and sagittal reconstructions were produced.    This exam was performed according to our departmental dose-optimization program, which includes automated exposure control, adjustment of the mA and/or kV according to patient size and/or use of iterative reconstruction technique.    Unless otherwise stated, incidental findings do not require dedicated follow-up imaging.    FINDINGS: There is normal branching of the great vessels from the aortic arch that all appear widely patent. No stenoses are evident within either common or external or internal carotid artery within the neck. The carotid bifurcations are normal. The vertebral arteries are codominant and are both widely patent throughout their course neck and head. No atherosclerotic disease is evident within the intracranial arterial vasculature. There are no significant stenoses or focal intracranial occlusions. No intentional embolic source is demonstrated. Note that review of the patient's MRI shows evidence of extensive small vessel chronic ischemic change and also extensive amyloid angiopathy which likely accounts for the patient's infarcts. The overall appearance is not typical of embolic infarcts. That study showed a punctate acute ischemic infarct in the superior aspect of the midbrain to the right of midline that is quite likely not be embolic in etiology. There are no aneurysms. Normal enhancement is observed in the dural venous sinuses. There is no abnormal enhancement within the brain parenchyma or  brainstem.    IMPRESSION:   Unremarkable CT angiography imaging of the neck and head. There is essentially no evidence of atherosclerotic disease. There are no significant stenoses or focal occlusions within the arterial vasculature of the neck or head. See above discussion.    Electronically signed by:  Haresh Frausto MD  5/2/2023 6:11 PM CDT Workstation: [id number]                                     CT HEAD FOR STROKE (Final result)  Result time 05/02/23 17:58:00   Procedure changed from CT Head Without Contrast     Final result by Otilio Frausto MD (05/02/23 17:58:00)                   Narrative:      EXAM: CT HEAD FOR STROKE    HISTORY: Neuro deficit, acute, stroke suspected    COMPARISON: CT scan of the head dated 4/26/2023    TECHNIQUE: Multiple axial 3.0 mm thick images were acquired from the base of the skull to the vertex without IV contrast.    This exam was performed according to our departmental dose-optimization program, which includes automated exposure control, adjustment of the mA and/or kV according to patient size and/or use of iterative reconstruction technique.    Unless otherwise stated, incidental findings do not require dedicated follow-up imaging.    FINDINGS: There is patchy ill-defined abnormal diminished attenuation throughout the white matter of both cerebral hemispheres that appears similar on the previous CT scan and is consistent with sequela of extensive small vessel chronic ischemic change. No definite acute infarcts are identified. There is no acute intracranial hemorrhage. There is a small chronic infarct in the medial aspect of the left frontal lobe in the vascular territory of the left anterior cerebral artery that is unchanged. There is also small chronic infarct in the posterior aspect of the regina that is unchanged. This was better shown on the MRI study dated 4/27/2023 which also showed a punctate acute lacunar infarct in the far superior aspect of the midbrain on the  right. There are no discrete intracranial masses. The paranasal sinuses and mastoid air cells and middle ear cavities are well aerated.    IMPRESSION:   Evidence of extensive small vessel chronic ischemic change as described. Small chronic lacunar infarct in the posterior aspect of the regina. No acute intracranial abnormality is identified.    Electronically signed by:  Haresh Frausto MD  5/2/2023 5:58 PM CDT Workstation: [id number]                                      Prior diet: regular diet, thin liquids at home; NPO until evaluated by ST since admit.    Occupation/hobbies/homemaking: none reported by pt.    Subjective     Pt awake in bed. Agreeable to ST eval. Pt's nirali' arrived shortly after beginning eval. He was able to report on pt's hx.  Patient goals: none stated     Pain/Comfort:       Respiratory Status: Room air    Objective:   Pt's nirali' reports pt did not receive ST after her past CVAs, though he feels she probably should have. Results of last admit's evaluations indicate pt w/ deficits that should have warranted for ST at discharge.     Cognitive Status:      MoCA (Version 8.1) administered with pt achieving a score of 12/30 suggesting severe cognitive-linguistic impairment. Pt earned 2 of 5 points on visuospatial/executive functions, 3 of 3 points on naming, 2 of 6 points for attention, 0 of 3 points for language, 0 of 2 points for abstraction, 0 of 5 points for delayed recall and 4 of 6 points for orientation. Pt received 1 extra point for less than 12 years of education. Pt demonstrated deficits in visuospatial and executive function skills, attention, language, abstraction, memory, and orientation. Last admit (4/28/23), pt received a score of 9/30; pt's score improved; however, pt continues w/ similar deficits.     Receptive Language:   Comprehension:      Questions Simple yes/no WFL  Open ended questions mostly appropriate  Commands  One step needed visual cue intermittently  two step basic  commands needed visual cue intermittently  Conversation WFL    Pragmatics:    appropriate    Expressive Language:  Verbal:    Repetition Words WFL and Sentences WFL; though impacted by memory  Naming Confrontation WFL  Conversational speech no word finding deficits noted; observed to be WFL        Motor Speech:  Dysarthria : slow, imprecise articulation; hypernasality    Voice:     Adequate for age, sex, size.      Visual-Spatial:  Appeared to be WFL during MoCA    Reading:   Not assessed      Written Expression:   Difficulty observed; imprecise handwriting due to unsteadiness; however, pt was able to write items when prompted    Oral Musculature Evaluation  Oral Musculature: other (see comments), facial asymmetry present (slowed movement, R. side dcr'd ROM/asymmetry)  Dentition: present and adequate  Secretion Management: adequate  Mucosal Quality: good  Mandibular Strength and Mobility: WFL  Oral Labial Strength and Mobility: impaired retraction, impaired pursing, functional seal, functional coordination, other (see comments) (dcr'd R. sided movement)  Lingual Strength and Mobility: impaired strength, functional protrusion, functional anterior elevation, functional lateral movement, other (see comments) (slowed lingual movement)  Velar Elevation: impaired, other (see comments) (dcr'd elevation)  Buccal Strength and Mobility: other (see comments), flaccid (R. side)  Volitional Cough: weak  Volitional Swallow: palpated; adequate laryngeal elevation/excursion  Voice Prior to PO Intake: clear    Bedside Swallow Eval:   Consistencies Assessed:  Thin liquids water via cup edge and straw  Puree tsp bites pudding  Mixed consistencies tsp bites diced peaches in thin juice  Solids dry cracker      Oral Phase:   WFL    Pharyngeal Phase:   no overt clinical signs/symptoms of aspiration  no overt clinical signs/symptoms of pharyngeal dysphagia    Compensatory Strategies  None    Treatment: Pt and family educated re purpose of  evaluation, role of SLP, results of evaluation, diet recs, and cognitive-linguistic/speech recs. SLP explained need for ST during admit and at next level of care. Pt and family expressed understanding of recs.      Assessment:     Steff Johnson is a 63 y.o. female with an SLP diagnosis of mild Dysarthria and severe Cognitive-Linguistic Impairment.  She presents with imprecise articulation and hypernasality; deficits in executive functioning, memory, attention, language, and orientation noted upon completion of MoCA assessment. No overt s/s aspiration or oropharyngeal dysphagia noted upon completion of BSE. Rec pt receive skilled ST services during admit and at next level of care to address dysarthria and cognitive-linguistic deficits. Rec regular diet and thin liquids.    Goals:   Multidisciplinary Problems       SLP Goals          Problem: SLP    Goal Priority Disciplines Outcome   SLP Goal     SLP Ongoing, Progressing   Description: 1. Pt will complete oral motor exercises and speech drills to improve articulation and velar movement for adequate nasality during speech production   2. Pt will complete abstract naming tasks w/ 70% accuracy given mod cues  3. Pt will correctly answer orientation questions w/ 80% accuracy given min cues  4. Pt will complete memory tasks w/ 70% accuracy given mod  cues                       Plan:     Patient to be seen:  3 x/week   Plan of Care expires:     Plan of Care reviewed with:  patient, significant other, other (see comments) (nursing)   SLP Follow-Up:  Yes       Discharge recommendations:  Discharge Facility/Level of Care Needs: rehabilitation facility, outpatient speech therapy (outpatient if she has someone to bring her)   Barriers to Discharge:  None    Time Tracking:     SLP Treatment Date:   23  Speech Start Time:  856  Speech Stop Time:  932     Speech Total Time (min):  36 min    Billable Minutes: Eval cognitive-linguistic/speech: 20 min; Swallowin min      05/03/2023

## 2023-05-03 NOTE — HPI
Ms. Johnson is a 63-year-old right-handed female who was brought to the ED due to neurological symptoms.  Patient is a poor historian, only verbalized few words during my encounter, collateral from family present at bedside as well as ED provider.  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  As per history, noted to have left facial droop with right upper extremity weakness, slurred speech with word-finding difficulties, reported initial episode started yesterday but somewhat improved, reoccurred around lunchtime (12:30 pm) today which prompted ED presentation.  Patient denies headache, swallowing difficulties, does reports vision abnormalities but unclear if new or chronic, does report weakness right upper extremity, she lives with her father at home who assist her, reports did take all medications this morning. Family is concerned about high salt diet. Patient is a non smoker.  BP on presentation 201/96.  CBC not available, discussed with lab, needs to be recollected, communicated with nursing.  Sodium 136, BUN/creatinine 27/1.2, INR 1, LDL 65, troponin 2.6, glucose 131.  Chest x-ray poor expansion with no acute abnormalities, CT head no acute hemorrhage, CTA no large vessel occlusion.  She was evaluated by tele stroke, Dr Goodman, out of window for tPA, MRI brain without contrast, continuing dual antiplatelet therapy, allowing for permissive hypertension up to 210/120.  Case discussed with ED provider who requested admission.  Plan of care was discussed with family members present at bedside.

## 2023-05-03 NOTE — PT/OT/SLP EVAL
Physical Therapy Evaluation    Patient Name:  Steff Johnson   MRN:  [id number]    Recommendations:     Discharge Recommendations: outpatient PT   Discharge Equipment Recommendations: none   Barriers to discharge: None    Assessment:     Steff Johnson is a 63 y.o. female admitted with a medical diagnosis of Hypertensive emergency.  She presents with the following impairments/functional limitations: weakness, impaired endurance, impaired self care skills, impaired functional mobility, gait instability, impaired balance, impaired cognition, decreased upper extremity function, decreased lower extremity function, pain, abnormal tone, impaired cardiopulmonary response to activity .    Rehab Prognosis: Fair; patient would benefit from acute skilled PT services to address these deficits and reach maximum level of function.    Recent Surgery: Procedure(s) (LRB):  Transesophageal echo (GT) intra-procedure log documentation (N/A)      Plan:     During this hospitalization, patient to be seen 6 x/week to address the identified rehab impairments via gait training, therapeutic activities, therapeutic exercises and progress toward the following goals:    Plan of Care Expires:       Subjective     Chief Complaint: tired  Patient/Family Comments/goals: go home  Pain/Comfort:  Pain Rating 1: other (see comments) (not rated)  Location - Side 1: Right  Location 1: shoulder  Pain Addressed 1: Reposition, Distraction, Cessation of Activity, Nurse notified    Patients cultural, spiritual, Quaker conflicts given the current situation:      Living Environment:  Pt lives mobile home with significant other   Prior to admission, patients level of function was needed assist from significant other.  Equipment used at home: rollator, shower chair, cane, straight, bedside commode, walker, rolling.  DME owned (not currently used): none.  Upon discharge, patient will have assistance from significant other.    Objective:     Communicated with  RN prior to session.  Patient found HOB elevated with PureWick, bed alarm, peripheral IV  upon PT entry to room.    General Precautions: Standard, fall  Orthopedic Precautions:    Braces:    Respiratory Status: Room air    Exams:  Cognitive Exam:  Patient is oriented to Person and Place  RLE ROM: WFL  RLE Strength: 4-/5  LLE ROM: WFL  LLE Strength: 4/5    Functional Mobility:  Bed Mobility:     Supine to Sit: stand by assistance  Sit to Supine: stand by assistance  Transfers:     Sit to Stand:  contact guard assistance with rolling walker  Bed to Chair: contact guard assistance with  rolling walker  using  Step Transfer  Gait: x10' w/RW and Delores for safety due to LOB.      AM-PAC 6 CLICK MOBILITY  Total Score:17       Treatment & Education:  Pt was educated on the following: call light use, importance of OOB activity and functional mobility to negate the negative effects of prolonged bed rest during this hospitalization, safe transfers/ambulation and discharge planning recommendations/options.     Patient left up in chair with all lines intact, call button in reach, chair alarm on, and RN notified.    GOALS:   Multidisciplinary Problems       Physical Therapy Goals          Problem: Physical Therapy    Goal Priority Disciplines Outcome Goal Variances Interventions   Physical Therapy Goal     PT, PT/OT      Description: All PT goals to be met by discharge:    1. Supine to sit independent  2. Sit to stand transfer with Stand-by Assistance  3.. Bed to chair transfer with Supervision using Rolling Walker  4. Gait  x 100 feet with Minimal Assistance using Rolling Walker.                          History:     Past Medical History:   Diagnosis Date    Arthritis     Complete tear of left rotator cuff 11/09/2017    COPD (chronic obstructive pulmonary disease)     COVID-19 infection 07/02/2021    Diabetes mellitus     H/o Cerebrovascular accident (CVA) of left pontine structure 12/12/2019    Hypertension     Personal history  of colonic polyps     Stroke     Wears glasses        Past Surgical History:   Procedure Laterality Date    COLONOSCOPY N/A 4/11/2017    Procedure: COLONOSCOPY;  Surgeon: Jared Antonio MD;  Location: [facility];  Service: Endoscopy;  Laterality: N/A;    HYSTERECTOMY      OOPHORECTOMY      SHOULDER SURGERY      R       Time Tracking:     PT Received On: 05/03/23  PT Start Time: 1040     PT Stop Time: 1111  PT Total Time (min): 31 min     Billable Minutes: Evaluation 8, Gait Training 12, and Therapeutic Activity 11      05/03/2023

## 2023-05-03 NOTE — SUBJECTIVE & OBJECTIVE
Past Medical History:   Diagnosis Date    Arthritis     Complete tear of left rotator cuff 11/09/2017    COPD (chronic obstructive pulmonary disease)     COVID-19 infection 07/02/2021    Diabetes mellitus     H/o Cerebrovascular accident (CVA) of left pontine structure 12/12/2019    Hypertension     Personal history of colonic polyps     Stroke     Wears glasses        Past Surgical History:   Procedure Laterality Date    COLONOSCOPY N/A 4/11/2017    Procedure: COLONOSCOPY;  Surgeon: Jared Antonio MD;  Location: [facility];  Service: Endoscopy;  Laterality: N/A;    HYSTERECTOMY      OOPHORECTOMY      SHOULDER SURGERY      R       Review of patient's allergies indicates:  No Known Allergies    No current facility-administered medications on file prior to encounter.     Current Outpatient Medications on File Prior to Encounter   Medication Sig    amLODIPine (NORVASC) 10 MG tablet Take 1 tablet (10 mg total) by mouth once daily.    aspirin 81 MG Chew Take 1 tablet (81 mg total) by mouth once daily.    atorvastatin (LIPITOR) 40 MG tablet Take 1 tablet (40 mg total) by mouth once daily.    cloNIDine 0.1 mg/24 hr td ptwk (CATAPRES) 0.1 mg/24 hr Place 1 patch onto the skin every 7 days.    clopidogreL (PLAVIX) 75 mg tablet Take 1 tablet (75 mg total) by mouth once daily.    hydrALAZINE (APRESOLINE) 100 MG tablet Take 1 tablet (100 mg total) by mouth 3 (three) times daily.    insulin aspart U-100 (NOVOLOG) 100 unit/mL injection Inject into the skin 3 (three) times daily before meals.    insulin degludec (TRESIBA FLEXTOUCH U-100) 100 unit/mL (3 mL) insulin pen ADMINISTER 40 UNITS UNDER THE SKIN EVERY EVENING (Patient taking differently: Inject 40 Units into the skin every evening. ADMINISTER 40 UNITS UNDER THE SKIN EVERY EVENING)    losartan (COZAAR) 50 MG tablet Take 1 tablet (50 mg total) by mouth once daily.    metoprolol succinate (TOPROL-XL) 100 MG 24 hr tablet Take 1 tablet (100 mg total) by mouth once daily.     "tiotropium (SPIRIVA) 18 mcg inhalation capsule Inhale 1 capsule (18 mcg total) into the lungs once daily. Controller    pen needle, diabetic (BD ULTRA-FINE SHORT PEN NEEDLE) 31 gauge x 5/16" Ndle 1 pen by Misc.(Non-Drug; Combo Route) route 4 (four) times daily.    [DISCONTINUED] blood-glucose meter (TRUE METRIX GLUCOSE METER) kit Use as instructed    [DISCONTINUED] brimonidine 0.2% (ALPHAGAN) 0.2 % Drop 1 drop every 8 (eight) hours.    [DISCONTINUED] chlorthalidone (HYGROTEN) 50 MG Tab Take 1 tablet (50 mg total) by mouth once daily.    [DISCONTINUED] lancing device (TRUEDRAW LANCING DEVICE) Misc Inject 1 Device into the skin 2 (two) times daily.    [DISCONTINUED] OZURDEX 0.7 mg Impl intravitreal implant 0.7 mg by Intravitreal route once.     Family History       Problem Relation (Age of Onset)    Anemia Daughter    Asthma Mother    Diabetes Mother, Father, Brother    Hypertension Mother, Brother          Tobacco Use    Smoking status: Never    Smokeless tobacco: Never   Substance and Sexual Activity    Alcohol use: No    Drug use: No    Sexual activity: Not Currently     Review of Systems   Constitutional:  Negative for fever.   HENT:  Negative for trouble swallowing.    Eyes:  Positive for visual disturbance.   Respiratory:  Negative for shortness of breath.    Cardiovascular:  Negative for chest pain.   Gastrointestinal:  Negative for vomiting.   Neurological:  Positive for facial asymmetry and speech difficulty. Negative for headaches.   Objective:     Vital Signs (Most Recent):  Temp: 98.2 °F (36.8 °C) (05/02/23 1700)  Pulse: 62 (05/02/23 2000)  Resp: 15 (05/02/23 2000)  BP: (!) 193/88 (05/02/23 2000)  SpO2: 98 % (05/02/23 2000)   Vital Signs (24h Range):  Temp:  [98.2 °F (36.8 °C)] 98.2 °F (36.8 °C)  Pulse:  [62-65] 62  Resp:  [14-20] 15  SpO2:  [98 %-99 %] 98 %  BP: (184-203)/(82-96) 193/88     Weight: 59 kg (130 lb)  Body mass index is 26.26 kg/m².    Physical Exam  Vitals and nursing note reviewed. "   Constitutional:       Appearance: She is not toxic-appearing or diaphoretic.      Comments: Chronically ill appearing, lying in stretcher   HENT:      Head: Normocephalic and atraumatic.      Mouth/Throat:      Mouth: Mucous membranes are moist.      Comments: No oral thrush  Eyes:      General:         Right eye: No discharge.         Left eye: No discharge.      Extraocular Movements: Extraocular movements intact.      Conjunctiva/sclera: Conjunctivae normal.      Pupils: Pupils are equal, round, and reactive to light.   Cardiovascular:      Rate and Rhythm: Normal rate and regular rhythm.   Pulmonary:      Effort: Pulmonary effort is normal. No respiratory distress.      Breath sounds: Normal breath sounds. No stridor. No wheezing or rhonchi.      Comments: On RA  Abdominal:      General: Bowel sounds are normal. There is no distension.      Palpations: Abdomen is soft.      Tenderness: There is no abdominal tenderness. There is no guarding.   Genitourinary:     Comments: Wearing diaper, pure wick catheter in place  Musculoskeletal:      Cervical back: Neck supple.   Skin:     Comments: Superficial abrasion right anterior leg    Neurological:      Mental Status: She is alert.      Comments: Alert, only verbalized few words, slow to respond, no dysarthria appreciated, following simple commands all four extremities, lifting bilateral LE against gravity about 20 degrees, power 4+/5, RUE power 4/5, + drift present, LUE power 5/5, left facial droop present, decreased sensation extremities throughout subjectively   Psychiatric:      Comments: calm         CRANIAL NERVES     CN III, IV, VI   Pupils are equal, round, and reactive to light.     Significant Labs: BMP:   Recent Labs   Lab 05/02/23  1750   *      K 4.6      CO2 20*   BUN 27*   CREATININE 1.2   CALCIUM 9.2     CBC: No results for input(s): WBC, HGB, HCT, PLT in the last 48 hours.  CMP:   Recent Labs   Lab 05/02/23  1750      K 4.6       CO2 20*   *   BUN 27*   CREATININE 1.2   CALCIUM 9.2   PROT 7.6   ALBUMIN 3.7   BILITOT 0.7   ALKPHOS 71   AST 24   ALT 24   ANIONGAP 8     Cardiac Markers: No results for input(s): CKMB, MYOGLOBIN, BNP, TROPISTAT in the last 48 hours.  Lactic Acid: No results for input(s): LACTATE in the last 48 hours.  Magnesium: No results for input(s): MG in the last 48 hours.  POCT Glucose: No results for input(s): POCTGLUCOSE in the last 48 hours.  Troponin:   Recent Labs   Lab 05/02/23  1750   TROPONINIHS 2.6     TSH: No results for input(s): TSH in the last 4320 hours.  Urine Culture: No results for input(s): LABURIN in the last 48 hours.  Urine Studies: No results for input(s): COLORU, APPEARANCEUA, PHUR, SPECGRAV, PROTEINUA, GLUCUA, KETONESU, BILIRUBINUA, OCCULTUA, NITRITE, UROBILINOGEN, LEUKOCYTESUR, RBCUA, WBCUA, BACTERIA, SQUAMEPITHEL, HYALINECASTS in the last 48 hours.    Invalid input(s): WRIGHTSUR    Significant Imaging: I have reviewed all pertinent imaging results/findings within the past 24 hours.      CT HEAD FOR STROKE    Result Date: 5/2/2023  EXAM: CT HEAD FOR STROKE HISTORY: Neuro deficit, acute, stroke suspected COMPARISON: CT scan of the head dated 4/26/2023 TECHNIQUE: Multiple axial 3.0 mm thick images were acquired from the base of the skull to the vertex without IV contrast. This exam was performed according to our departmental dose-optimization program, which includes automated exposure control, adjustment of the mA and/or kV according to patient size and/or use of iterative reconstruction technique. Unless otherwise stated, incidental findings do not require dedicated follow-up imaging. FINDINGS: There is patchy ill-defined abnormal diminished attenuation throughout the white matter of both cerebral hemispheres that appears similar on the previous CT scan and is consistent with sequela of extensive small vessel chronic ischemic change. No definite acute infarcts are identified. There  is no acute intracranial hemorrhage. There is a small chronic infarct in the medial aspect of the left frontal lobe in the vascular territory of the left anterior cerebral artery that is unchanged. There is also small chronic infarct in the posterior aspect of the regina that is unchanged. This was better shown on the MRI study dated 4/27/2023 which also showed a punctate acute lacunar infarct in the far superior aspect of the midbrain on the right. There are no discrete intracranial masses. The paranasal sinuses and mastoid air cells and middle ear cavities are well aerated. IMPRESSION:   Evidence of extensive small vessel chronic ischemic change as described. Small chronic lacunar infarct in the posterior aspect of the regina. No acute intracranial abnormality is identified. Electronically signed by:  Haresh Frausto MD  5/2/2023 5:58 PM CDT Workstation: [id number]    Echo Saline Bubble? No    Result Date: 4/28/2023  · The left ventricle is normal in size with normal systolic function. · Indeterminate left ventricular diastolic function. · The estimated ejection fraction is 62%. · Normal right ventricular size     CTA Head and Neck (xpd)    Result Date: 5/2/2023  EXAM: CTA HEAD AND NECK (XPD) HISTORY: Stroke/TIA, determine embolic source COMPARISON: CTA of the head and neck dated 4/27/2023. MRI of the brain dated 4/27/2023. TECHNIQUE: Multiple axial 1 mm thick images were obtained through the neck and head during rapid IV injection of contrast. Coronal and sagittal reconstructions were produced. This exam was performed according to our departmental dose-optimization program, which includes automated exposure control, adjustment of the mA and/or kV according to patient size and/or use of iterative reconstruction technique. Unless otherwise stated, incidental findings do not require dedicated follow-up imaging. FINDINGS: There is normal branching of the great vessels from the aortic arch that all appear widely patent. No  stenoses are evident within either common or external or internal carotid artery within the neck. The carotid bifurcations are normal. The vertebral arteries are codominant and are both widely patent throughout their course neck and head. No atherosclerotic disease is evident within the intracranial arterial vasculature. There are no significant stenoses or focal intracranial occlusions. No intentional embolic source is demonstrated. Note that review of the patient's MRI shows evidence of extensive small vessel chronic ischemic change and also extensive amyloid angiopathy which likely accounts for the patient's infarcts. The overall appearance is not typical of embolic infarcts. That study showed a punctate acute ischemic infarct in the superior aspect of the midbrain to the right of midline that is quite likely not be embolic in etiology. There are no aneurysms. Normal enhancement is observed in the dural venous sinuses. There is no abnormal enhancement within the brain parenchyma or brainstem. IMPRESSION:   Unremarkable CT angiography imaging of the neck and head. There is essentially no evidence of atherosclerotic disease. There are no significant stenoses or focal occlusions within the arterial vasculature of the neck or head. See above discussion. Electronically signed by:  Haresh Frausto MD  5/2/2023 6:11 PM CDT Workstation: [id number]

## 2023-05-03 NOTE — ANESTHESIA PREPROCEDURE EVALUATION
05/03/2023  Steff Johnson is a 63 y.o., female.      Patient Active Problem List   Diagnosis    Hypertension associated with diabetes    COPD (chronic obstructive pulmonary disease)    Hyperglycemia due to type 2 diabetes mellitus    Proteinuria    Complete tear of left rotator cuff    Left shoulder pain    Shoulder stiffness, left    Shoulder weakness    Type 2 diabetes mellitus with diabetic nephropathy, HbA1c 8.9%    Mixed hyperlipidemia    Type 2 diabetes mellitus with both eyes affected by moderate nonproliferative retinopathy and macular edema, with long-term current use of insulin    H/o Cerebrovascular accident (CVA) of left pontine structure    H/o Cranial nerve III palsy, left    Gait abnormality    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    Vitamin D deficiency    Hypertension    Hypertensive urgency    Acute right-sided weakness    Frequent falls    COVID-19 infection    Hypertensive emergency    Osteomyelitis of finger of left hand    Paronychia of finger, left    Ocular hypertension, bilateral    Combined forms of age-related cataract, bilateral    Hypertensive crisis    Acute ischemic stroke     CKD (chronic kidney disease), stage III    History of stroke       Past Surgical History:   Procedure Laterality Date    COLONOSCOPY N/A 4/11/2017    Procedure: COLONOSCOPY;  Surgeon: Jared Antonio MD;  Location: [facility];  Service: Endoscopy;  Laterality: N/A;    HYSTERECTOMY      OOPHORECTOMY      SHOULDER SURGERY      R        Tobacco Use:  The patient  reports that she has never smoked. She has never used smokeless tobacco.     Results for orders placed or performed during the hospital encounter of 04/26/23   EKG 12-lead    Collection Time: 04/26/23  9:30 PM    Narrative    Test Reason : R07.9,    Vent. Rate : 071 BPM     Atrial Rate : 071 BPM     P-R Int :  136 ms          QRS Dur : 076 ms      QT Int : 434 ms       P-R-T Axes : 065 038 115 degrees     QTc Int : 471 ms    Normal sinus rhythm  Nonspecific T wave abnormality  Abnormal ECG  When compared with ECG of 18-MAY-2022 16:58,  No significant change was found    Referred By: REJI   SELF           Confirmed By:         Imaging Results          MRI Brain Without Contrast (Final result)  Result time 05/02/23 22:59:18    Final result by Elieser Preciado MD (05/02/23 22:59:18)                 Narrative:    MR BRAIN WITHOUT IV CONTRAST    Comparisons: Brain MRI with and without contrast dated April 27, 2023    Additional pertinent history: Stroke    TECHNIQUE:  Multiplanar, multisequence brain MRI was performed without gadolinium contrast.    FINDINGS:    Sagittal midline structures and craniocervical junction: Negative.    Midline shift: None.    Ventricles: Negative.    Brain parenchyma:  Diffusion weighted imaging: Tiny region of restricted diffusion involving the posterior aspects of the right midbrain as well as a linear region of restricted diffusion within the mid left temporal lobe.  Gradient sequence: Continued findings of multiple foci of magnetic susceptibility within the brain parenchyma in a distribution similar to the previous exam with differential considerations including chronic small microvascular hemorrhages related to hypertensive episodes versus amyloid angiopathy.  T2 weighted FLAIR images: Patchy hypoattenuation within the periventricular and subcortical white matter, nonspecific but likely representing small vessel ischemic change on a chronic basis.    Extra-axial spaces: Mild cerebral atrophy.    Dural venous sinuses and major arterial flow voids: Negative.    Mastoid air cells and paranasal sinuses: Negative.    Surrounding soft tissues and orbits: Negative.    Impression:  1. Superimposed upon chronic age-related changes and multiple regions of either chronic microhemorrhage or amyloid  angiopathy are findings of 2 small regions of acute infarct within the right posterior midbrain in the mid left temporal lobe.  2. Other age related changes as described above.    Electronically signed by:  Elieser Preciado MD  5/2/2023 10:59 PM CDT Workstation: [id number]                             X-Ray Chest AP Portable (Final result)  Result time 05/02/23 19:05:01    Final result by Otilio Frausto MD (05/02/23 19:05:01)                 Narrative:      EXAM: XR CHEST AP PORTABLE    HISTORY: Stroke. Possible aspiration.    COMPARISON:Chest x-ray dated 4/26/2023    FINDINGS: The lungs are somewhat poorly inflated. There is mild linear atelectasis in the left midlung zone. No acute focal infiltrates are identified. There are no pleural effusions. The cardiomediastinal silhouette is unremarkable.    IMPRESSION:   Poor lung expansion. No evidence of acute disease within the chest.    Electronically signed by:  Haresh Frausto MD  5/2/2023 7:05 PM CDT Workstation: [id number]                             CTA Head and Neck (xpd) (Final result)  Result time 05/02/23 18:11:30    Final result by Otilio Frausto MD (05/02/23 18:11:30)                 Narrative:      EXAM: CTA HEAD AND NECK (XPD)    HISTORY: Stroke/TIA, determine embolic source    COMPARISON: CTA of the head and neck dated 4/27/2023. MRI of the brain dated 4/27/2023.    TECHNIQUE: Multiple axial 1 mm thick images were obtained through the neck and head during rapid IV injection of contrast. Coronal and sagittal reconstructions were produced.    This exam was performed according to our departmental dose-optimization program, which includes automated exposure control, adjustment of the mA and/or kV according to patient size and/or use of iterative reconstruction technique.    Unless otherwise stated, incidental findings do not require dedicated follow-up imaging.    FINDINGS: There is normal branching of the great vessels from the aortic arch that all appear  widely patent. No stenoses are evident within either common or external or internal carotid artery within the neck. The carotid bifurcations are normal. The vertebral arteries are codominant and are both widely patent throughout their course neck and head. No atherosclerotic disease is evident within the intracranial arterial vasculature. There are no significant stenoses or focal intracranial occlusions. No intentional embolic source is demonstrated. Note that review of the patient's MRI shows evidence of extensive small vessel chronic ischemic change and also extensive amyloid angiopathy which likely accounts for the patient's infarcts. The overall appearance is not typical of embolic infarcts. That study showed a punctate acute ischemic infarct in the superior aspect of the midbrain to the right of midline that is quite likely not be embolic in etiology. There are no aneurysms. Normal enhancement is observed in the dural venous sinuses. There is no abnormal enhancement within the brain parenchyma or brainstem.    IMPRESSION:   Unremarkable CT angiography imaging of the neck and head. There is essentially no evidence of atherosclerotic disease. There are no significant stenoses or focal occlusions within the arterial vasculature of the neck or head. See above discussion.    Electronically signed by:  Haresh Frausto MD  5/2/2023 6:11 PM CDT Workstation: [id number]                             CT HEAD FOR STROKE (Final result)  Result time 05/02/23 17:58:00   Procedure changed from CT Head Without Contrast     Final result by Otilio Frausto MD (05/02/23 17:58:00)                 Narrative:      EXAM: CT HEAD FOR STROKE    HISTORY: Neuro deficit, acute, stroke suspected    COMPARISON: CT scan of the head dated 4/26/2023    TECHNIQUE: Multiple axial 3.0 mm thick images were acquired from the base of the skull to the vertex without IV contrast.    This exam was performed according to our departmental  dose-optimization program, which includes automated exposure control, adjustment of the mA and/or kV according to patient size and/or use of iterative reconstruction technique.    Unless otherwise stated, incidental findings do not require dedicated follow-up imaging.    FINDINGS: There is patchy ill-defined abnormal diminished attenuation throughout the white matter of both cerebral hemispheres that appears similar on the previous CT scan and is consistent with sequela of extensive small vessel chronic ischemic change. No definite acute infarcts are identified. There is no acute intracranial hemorrhage. There is a small chronic infarct in the medial aspect of the left frontal lobe in the vascular territory of the left anterior cerebral artery that is unchanged. There is also small chronic infarct in the posterior aspect of the regina that is unchanged. This was better shown on the MRI study dated 4/27/2023 which also showed a punctate acute lacunar infarct in the far superior aspect of the midbrain on the right. There are no discrete intracranial masses. The paranasal sinuses and mastoid air cells and middle ear cavities are well aerated.    IMPRESSION:   Evidence of extensive small vessel chronic ischemic change as described. Small chronic lacunar infarct in the posterior aspect of the regina. No acute intracranial abnormality is identified.    Electronically signed by:  Haresh Frausto MD  5/2/2023 5:58 PM CDT Workstation: [id number]                               Lab Results   Component Value Date    WBC 10.38 05/03/2023    HGB 12.6 05/03/2023    HCT 35.9 (L) 05/03/2023    MCV 80 (L) 05/03/2023     (H) 05/03/2023     BMP  Lab Results   Component Value Date     05/03/2023    K 4.0 05/03/2023     (H) 05/03/2023    CO2 21 (L) 05/03/2023    BUN 23 05/03/2023    CREATININE 1.0 05/03/2023    CALCIUM 9.1 05/03/2023    ANIONGAP 7 (L) 05/03/2023    GLU 91 05/03/2023     (H) 05/02/2023     (H)  04/28/2023       Results for orders placed during the hospital encounter of 04/26/23    Echo Saline Bubble? No    Interpretation Summary  · The left ventricle is normal in size with normal systolic function.  · Indeterminate left ventricular diastolic function.  · The estimated ejection fraction is 62%.  · Normal right ventricular size with normal right ventricular systolic function.  · Normal central venous pressure (3 mmHg).        Pre-op Assessment    I have reviewed the Patient Summary Reports.     I have reviewed the Nursing Notes. I have reviewed the NPO Status.   I have reviewed the Medications.     Review of Systems  Anesthesia Hx:  No problems with previous Anesthesia  Denies Family Hx of Anesthesia complications.   Denies Personal Hx of Anesthesia complications.   Social:  Non-Smoker, No Alcohol Use    Hematology/Oncology:     Oncology Normal     EENT/Dental:EENT/Dental Normal   Cardiovascular:   Hypertension, poorly controlled hyperlipidemia ECG has been reviewed. Patient admitted with hypertensive emergency   Pulmonary:   COPD    Renal/:   Chronic Renal Disease, CKD  Kidney Function/Disease, Chronic Kidney Disease (CKD) , CKD Stage III (GFR 30-59)    Hepatic/GI:  Hepatic/GI Normal    Musculoskeletal:   Arthritis     Neurological:   CVA, residual symptoms Neuromuscular Disease, Slurred speech  H/o Cerebrovascular accident (CVA) of left pontine structure  H/o Cranial nerve III palsy, left.    Patient has residual right-sided weakness   CVA - Cerebrovasular Accident  Neuromuscular Disease   Endocrine:   Diabetes, poorly controlled, type 2  Obesity / BMI > 30  Dermatological:  Skin Normal    Psych:  Psychiatric Normal           Physical Exam  General: Well nourished, Cooperative, Alert and Oriented    Airway:  Mallampati: III   Mouth Opening: Normal  TM Distance: > 6 cm  Tongue: Normal  Neck ROM: Normal ROM    Dental:  Periodontal disease    Chest/Lungs:  Clear to auscultation, Normal Respiratory  Rate    Heart:  Rate: Normal  Rhythm: Regular Rhythm        Anesthesia Plan  Type of Anesthesia, risks & benefits discussed:    Anesthesia Type: Gen Natural Airway  Intra-op Monitoring Plan: Standard ASA Monitors  Induction:  IV  ASA Score: 4  Anesthesia Plan Notes:     POM     Ready For Surgery From Anesthesia Perspective.     .

## 2023-05-03 NOTE — CONSULTS
[facility]  Department of Neurology  Neurology Consultation Note        PATIENT NAME: Steff Johnson  MRN: [id number]  CSN: [id number]      TODAY'S DATE: 05/03/2023  ADMIT DATE: 5/2/2023                            CONSULTING PROVIDER: Geoffrey Roque MD  CONSULT REQUESTED BY: Vilma Rodriguez MD      Reason for consult: R side weakness        History obtained from chart review and the patient.    HPI per EMR: Steff Johnson is a 63 y.o. female  who was brought to the ED due to neurological symptoms.  Patient is a poor historian, only verbalized few words during my encounter, collateral from family present at bedside as well as ED provider.  Patient with recent [facility] admission, 4/26 to 4/28, presented with vision changes with slurred speech, treated for hypertensive emergency, MRI with acute right medial midbrain infarct, seen by Neurology, dual antiplatelet therapy, PT/OT recommended home health.  As per history, noted to have left facial droop with right upper extremity weakness, slurred speech with word-finding difficulties, reported initial episode started yesterday but somewhat improved, reoccurred around lunchtime (12:30 pm) today which prompted ED presentation.  Patient denies headache, swallowing difficulties, does reports vision abnormalities but unclear if new or chronic, does report weakness right upper extremity, she lives with her father at home who assist her, reports did take all medications this morning. Family is concerned about high salt diet. Patient is a non smoker.  BP on presentation 201/96.  CBC not available, discussed with lab, needs to be recollected, communicated with nursing.  Sodium 136, BUN/creatinine 27/1.2, INR 1, LDL 65, troponin 2.6, glucose 131.  Chest x-ray poor expansion with no acute abnormalities, CT head no acute hemorrhage, CTA no large vessel occlusion.  She was evaluated by tele stroke, Dr Goodman, out of window for tPA, MRI brain without contrast, continuing  dual antiplatelet therapy, allowing for permissive hypertension up to 210/120.  Case discussed with ED provider who requested admission.  Plan of care was discussed with family members present at bedside    Neurology consult:  Patient was seen and examined by me.  Patient is a poor historian however states that she presented with worsening right-sided weakness.    Per chart review, she presented with right-sided weakness, slurred speech and word-finding difficulties which however improved and reoccurred around noon for which she was brought to the ED.    On exam, she has a mild right-sided facial droop, mild right upper extremity weakness and slurred speech.  She is admitted for further workup.    PREVIOUS MEDICAL HISTORY:  Past Medical History:   Diagnosis Date    Arthritis     Complete tear of left rotator cuff 11/09/2017    COPD (chronic obstructive pulmonary disease)     COVID-19 infection 07/02/2021    Diabetes mellitus     H/o Cerebrovascular accident (CVA) of left pontine structure 12/12/2019    Hypertension     Personal history of colonic polyps     Stroke     Wears glasses      PREVIOUS SURGICAL HISTORY:  Past Surgical History:   Procedure Laterality Date    COLONOSCOPY N/A 4/11/2017    Procedure: COLONOSCOPY;  Surgeon: Jared Antonio MD;  Location: [facility];  Service: Endoscopy;  Laterality: N/A;    HYSTERECTOMY      OOPHORECTOMY      SHOULDER SURGERY      R     FAMILY MEDICAL HISTORY:  Family History   Problem Relation Age of Onset    Diabetes Mother     Asthma Mother     Hypertension Mother     Diabetes Father     Diabetes Brother     Hypertension Brother     Anemia Daughter     Glaucoma Neg Hx     Macular degeneration Neg Hx     Retinal detachment Neg Hx      SOCIAL HISTORY:  Social History     Tobacco Use    Smoking status: Never    Smokeless tobacco: Never   Substance Use Topics    Alcohol use: No    Drug use: No     ALLERGIES:  Review of patient's allergies indicates:  No Known Allergies  HOME  "MEDICATIONS:  Prior to Admission medications    Medication Sig Start Date End Date Taking? Authorizing Provider   amLODIPine (NORVASC) 10 MG tablet Take 1 tablet (10 mg total) by mouth once daily. 4/29/23 5/29/23 Yes Juan Alberto Iqbal MD   aspirin 81 MG Chew Take 1 tablet (81 mg total) by mouth once daily. 4/28/23 5/28/23 Yes Juan Alberto Iqbal MD   atorvastatin (LIPITOR) 40 MG tablet Take 1 tablet (40 mg total) by mouth once daily. 2/16/23 2/16/24 Yes Cristina Ren MD   cloNIDine 0.1 mg/24 hr td ptwk (CATAPRES) 0.1 mg/24 hr Place 1 patch onto the skin every 7 days. 2/16/23 2/16/24 Yes Cristina Ren MD   clopidogreL (PLAVIX) 75 mg tablet Take 1 tablet (75 mg total) by mouth once daily. 2/16/23 6/16/23 Yes Cristina Ren MD   dexAMETHasone (OZURDEX) 0.7 mg Impl intravitreal implant 0.7 mg by Intravitreal route once.   Yes Historical Provider   hydrALAZINE (APRESOLINE) 100 MG tablet Take 1 tablet (100 mg total) by mouth 3 (three) times daily. 2/16/23 2/16/24 Yes Cristina Ren MD   insulin aspart U-100 (NOVOLOG) 100 unit/mL injection Inject into the skin 3 (three) times daily before meals.   Yes Historical Provider   insulin degludec (TRESIBA FLEXTOUCH U-100) 100 unit/mL (3 mL) insulin pen ADMINISTER 40 UNITS UNDER THE SKIN EVERY EVENING  Patient taking differently: Inject 40 Units into the skin every evening. ADMINISTER 40 UNITS UNDER THE SKIN EVERY EVENING 2/16/23  Yes Cristina Ren MD   losartan (COZAAR) 50 MG tablet Take 1 tablet (50 mg total) by mouth once daily. 2/16/23  Yes Cristina Ren MD   metoprolol succinate (TOPROL-XL) 100 MG 24 hr tablet Take 1 tablet (100 mg total) by mouth once daily. 2/16/23 5/17/23 Yes Cristina Ren MD   tiotropium (SPIRIVA) 18 mcg inhalation capsule Inhale 1 capsule (18 mcg total) into the lungs once daily. Controller 2/16/23 2/16/24 Yes Cristina Ren MD   pen needle, diabetic (BD ULTRA-FINE SHORT PEN NEEDLE) 31 gauge x 5/16" Ndle 1 pen by Misc.(Non-Drug; Combo " "Route) route 4 (four) times daily. 2/16/23   Cristina Ren MD   blood-glucose meter (TRUE METRIX GLUCOSE METER) kit Use as instructed 10/20/21 5/18/22  Gabriel Moran NP   chlorthalidone (HYGROTEN) 50 MG Tab Take 1 tablet (50 mg total) by mouth once daily. 2/1/22 5/20/22  Cristina Ren MD   lancing device (TRUEDRAW LANCING DEVICE) Misc Inject 1 Device into the skin 2 (two) times daily. 3/31/22 5/18/22  Gabriel Moran NP     CURRENT SCHEDULED MEDICATIONS:   aspirin  81 mg Oral Daily    atorvastatin  40 mg Oral QHS    cloNIDine 0.1 mg/24 hr td ptwk  1 patch Transdermal Q7 Days    clopidogreL  75 mg Oral Daily    enoxaparin  40 mg Subcutaneous Daily    insulin detemir U-100 (Levemir)  40 Units Subcutaneous QHS    ipratropium  0.5 mg Nebulization Q6H    metoprolol succinate  100 mg Oral Daily     CURRENT INFUSIONS:    CURRENT PRN MEDICATIONS:  acetaminophen, dextrose 50%, glucagon (human recombinant), insulin aspart U-100, labetalol, ondansetron, senna-docusate 8.6-50 mg, sodium chloride 0.9%    REVIEW OF SYSTEMS:  Please refer to the HPI for all pertinent positive and negative findings. A comprehensive review of all other systems was negative.       PHYSICAL EXAM:  Patient Vitals for the past 24 hrs:   BP Temp Temp src Pulse Resp SpO2 Height Weight   05/03/23 0809 (!) 193/88 97.8 °F (36.6 °C) Oral 61 17 95 % -- --   05/03/23 0746 -- -- -- 63 18 98 % -- --   05/03/23 0332 (!) 215/93 97.7 °F (36.5 °C) Oral 60 15 (!) 93 % -- --   05/02/23 2230 (!) 205/99 97.9 °F (36.6 °C) Oral 65 17 97 % 4' 11" (1.499 m) 61.1 kg (134 lb 11.2 oz)   05/02/23 2000 (!) 193/88 -- -- 62 15 98 % -- --   05/02/23 1930 (!) 193/87 -- -- 62 16 99 % -- --   05/02/23 1900 (!) 203/85 -- -- 63 20 98 % -- --   05/02/23 1830 (!) 184/82 -- -- 64 14 99 % -- --   05/02/23 1801 (!) 185/83 -- -- 64 16 99 % -- --   05/02/23 1700 (!) 201/96 98.2 °F (36.8 °C) Oral 65 16 98 % 4' 11" (1.499 m) 59 kg (130 lb)       GENERAL APPEARANCE: Alert, " well-developed, well-nourished female in no acute distress.  HEENT: Normocephalic and atraumatic. PERRL. Oropharynx unremarkable.  PULM: Normal respiratory effort. No accessory muscle use.  CV: RRR.  ABDOMEN: Soft, nontender.  EXTREMITIES: No obvious signs of vascular compromise. Pulses present. No cyanosis, clubbing or edema.  SKIN: Clear; no rashes, lesions or skin breaks in exposed areas.    NEURO:  MENTAL STATUS: Patient awake and oriented to time, place, and person, recent/remote memory normal, and attention span/concentration normal.  Able to follow commands  Affect euthymic.    CRANIAL NERVES:  CN I: Not tested.  CN II: Fundoscopic exam deferred.  CN III, IV, VI: Pupils equal, round and reactive to light.  Extraocular movements full and intact.  CN V: Facial sensation normal.  CN VII: Facial asymmetry noted for right facial droop.  CN VIII: Hearing grossly normal and equal bilaterally.  No skew deviation or pathologic nystagmus.  CN IX, X: Palate elevates symmetrically. Speech/articulation is  mildly dysarthric .  CN XI: Shoulder shrug and chin rotation equal with good strength.  CN XII: Tongue protrusion midline.    MOTOR:  Bulk normal. Tone normal and symmetric throughout.  Abnormal movements absent.  Tremor: none present.  Strength  right upper extremity 4+/5, right lower extremity 4+/5 in left upper and lower extremities 5/5. .    REFLEXES:  DTRs 1+ throughout.  Plantar response equivocal bilaterally.  SENSATION: grossly intact throughout.  COORDINATION: normal finger-to-nose.  STATION: not tested.  GAIT: not tested.      NIHSS:  1a      Level of Consciousness (alert, drowsy, etc.):   0=alert; keenly responsive  1b.     Level of Consciousness Questions (month, age): 0= able to answer both questions  1c.     Level of Consciousness Commands (open, close eyes, make fist, let go):  0=Answers both tasks correctly  2.      Best Gaze (eyes open - patient follows examiner's finger or face):      0=normal  3.       Visual (introduce visual stimulus/threat to patient's visual field quadrants):  0=No visual loss  4.      Facial Palsy (show teeth, raise eyebrows and squeeze eyes shut):        1=Minor paralysis (flattened nasolabial fold, asymmetric on smiling)  5a.     Motor Arm - Left (elevate extremity 90 degrees and score drift/movement):       0=No drift, limb holds 90 (or 45) degrees for full 10 seconds  5b.     Motor Arm - Right (elevate extremity 90 degrees and score drift/movement):      0=No drift, limb holds 90 (or 45) degrees for full 10 seconds  6a.     Motor Leg - Left (elevate extremity 30 degrees and score drift/movement):       0=No drift, limb holds 90 (or 45) degrees for full 10 seconds  6b      Motor Leg - Right (elevate extremity 30 degrees and score drift/movement):      0=No drift, limb holds 90 (or 45) degrees for full 10 seconds  7.      Limb Ataxia (finger-nose, heel down shin):      0=Absent  8.      Sensory (pin prick to face, arm, trunk, and leg - compare side to side):        0=Normal; no sensory loss  9.      Best Language (name items, describe a picture and read sentences):      0=No aphasia, normal  10.     Dysarthria (evaluate speech clarity by patient repeating listed words): 1=Mild to moderate, patient slurs at least some words and at worst, can be understood with some difficulty  11.     Extinction and Inattention (use prior testing to identify neglect or double simultaneous stimuli testing):      0=No abnormality          NIH Stroke Scale Total:         2      Labs:  Recent Labs   Lab 04/26/23  2149 04/27/23  1628 04/28/23  0324 05/02/23  1750 05/03/23  0343      < > 136 136 139   K 4.0   < > 3.8 4.6 4.0      < > 106 108 111*   CO2 26   < > 21* 20* 21*   BUN 19   < > 31* 27* 23   CREATININE 1.1   < > 1.4 1.2 1.0   *   < > 289* 121* 91   CALCIUM 9.1   < > 9.0 9.2 9.1   PHOS  --   --   --   --  3.7   MG 1.8  --   --   --  2.0    < > = values in this interval not displayed.      Recent Labs   Lab 04/28/23  0324 05/02/23  2254 05/03/23  0343   WBC 9.98 10.47 10.38   HGB 12.2 12.6 12.6   HCT 34.4* 35.9* 35.9*    433 452*     Recent Labs   Lab 04/26/23  2149 05/02/23  1750   ALBUMIN 3.6 3.7   PROT 7.3 7.6   BILITOT 0.5 0.7   ALKPHOS 81 71   ALT 19 24   AST 19 24     Lab Results   Component Value Date    INR 1.0 05/02/2023     Lab Results   Component Value Date    TRIG 93 05/02/2023    HDL 73 05/02/2023    CHOLHDL 46.5 05/02/2023     Lab Results   Component Value Date    HGBA1C 10.2 (H) 05/03/2023     No results found for: PROTEINCSF, GLUCCSF, WBCCSF    Imaging:  I have reviewed and interpreted the pertinent imaging and lab results.      MRI Brain Without Contrast  MR BRAIN WITHOUT IV CONTRAST    Comparisons: Brain MRI with and without contrast dated April 27, 2023    Additional pertinent history: Stroke    TECHNIQUE:  Multiplanar, multisequence brain MRI was performed without gadolinium contrast.    FINDINGS:    Sagittal midline structures and craniocervical junction: Negative.    Midline shift: None.    Ventricles: Negative.    Brain parenchyma:  Diffusion weighted imaging: Tiny region of restricted diffusion involving the posterior aspects of the right midbrain as well as a linear region of restricted diffusion within the mid left temporal lobe.  Gradient sequence: Continued findings of multiple foci of magnetic susceptibility within the brain parenchyma in a distribution similar to the previous exam with differential considerations including chronic small microvascular hemorrhages related to hypertensive episodes versus amyloid angiopathy.  T2 weighted FLAIR images: Patchy hypoattenuation within the periventricular and subcortical white matter, nonspecific but likely representing small vessel ischemic change on a chronic basis.    Extra-axial spaces: Mild cerebral atrophy.    Dural venous sinuses and major arterial flow voids: Negative.    Mastoid air cells and paranasal sinuses:  Negative.    Surrounding soft tissues and orbits: Negative.    Impression:  1. Superimposed upon chronic age-related changes and multiple regions of either chronic microhemorrhage or amyloid angiopathy are findings of 2 small regions of acute infarct within the right posterior midbrain in the mid left temporal lobe.  2. Other age related changes as described above.    Electronically signed by:  Elieser Preciado MD  5/2/2023 10:59 PM CDT Workstation: [id number]  X-Ray Chest AP Portable  EXAM: XR CHEST AP PORTABLE    HISTORY: Stroke. Possible aspiration.    COMPARISON:Chest x-ray dated 4/26/2023    FINDINGS: The lungs are somewhat poorly inflated. There is mild linear atelectasis in the left midlung zone. No acute focal infiltrates are identified. There are no pleural effusions. The cardiomediastinal silhouette is unremarkable.    IMPRESSION:   Poor lung expansion. No evidence of acute disease within the chest.    Electronically signed by:  Haresh Frausto MD  5/2/2023 7:05 PM CDT Workstation: [id number]  CTA Head and Neck (xpd)  EXAM: CTA HEAD AND NECK (XPD)    HISTORY: Stroke/TIA, determine embolic source    COMPARISON: CTA of the head and neck dated 4/27/2023. MRI of the brain dated 4/27/2023.    TECHNIQUE: Multiple axial 1 mm thick images were obtained through the neck and head during rapid IV injection of contrast. Coronal and sagittal reconstructions were produced.    This exam was performed according to our departmental dose-optimization program, which includes automated exposure control, adjustment of the mA and/or kV according to patient size and/or use of iterative reconstruction technique.    Unless otherwise stated, incidental findings do not require dedicated follow-up imaging.    FINDINGS: There is normal branching of the great vessels from the aortic arch that all appear widely patent. No stenoses are evident within either common or external or internal carotid artery within the neck. The carotid  bifurcations are normal. The vertebral arteries are codominant and are both widely patent throughout their course neck and head. No atherosclerotic disease is evident within the intracranial arterial vasculature. There are no significant stenoses or focal intracranial occlusions. No intentional embolic source is demonstrated. Note that review of the patient's MRI shows evidence of extensive small vessel chronic ischemic change and also extensive amyloid angiopathy which likely accounts for the patient's infarcts. The overall appearance is not typical of embolic infarcts. That study showed a punctate acute ischemic infarct in the superior aspect of the midbrain to the right of midline that is quite likely not be embolic in etiology. There are no aneurysms. Normal enhancement is observed in the dural venous sinuses. There is no abnormal enhancement within the brain parenchyma or brainstem.    IMPRESSION:   Unremarkable CT angiography imaging of the neck and head. There is essentially no evidence of atherosclerotic disease. There are no significant stenoses or focal occlusions within the arterial vasculature of the neck or head. See above discussion.    Electronically signed by:  Haresh Frausto MD  5/2/2023 6:11 PM CDT Workstation: [id number]  CT HEAD FOR STROKE  EXAM: CT HEAD FOR STROKE    HISTORY: Neuro deficit, acute, stroke suspected    COMPARISON: CT scan of the head dated 4/26/2023    TECHNIQUE: Multiple axial 3.0 mm thick images were acquired from the base of the skull to the vertex without IV contrast.    This exam was performed according to our departmental dose-optimization program, which includes automated exposure control, adjustment of the mA and/or kV according to patient size and/or use of iterative reconstruction technique.    Unless otherwise stated, incidental findings do not require dedicated follow-up imaging.    FINDINGS: There is patchy ill-defined abnormal diminished attenuation throughout the  white matter of both cerebral hemispheres that appears similar on the previous CT scan and is consistent with sequela of extensive small vessel chronic ischemic change. No definite acute infarcts are identified. There is no acute intracranial hemorrhage. There is a small chronic infarct in the medial aspect of the left frontal lobe in the vascular territory of the left anterior cerebral artery that is unchanged. There is also small chronic infarct in the posterior aspect of the regina that is unchanged. This was better shown on the MRI study dated 4/27/2023 which also showed a punctate acute lacunar infarct in the far superior aspect of the midbrain on the right. There are no discrete intracranial masses. The paranasal sinuses and mastoid air cells and middle ear cavities are well aerated.    IMPRESSION:   Evidence of extensive small vessel chronic ischemic change as described. Small chronic lacunar infarct in the posterior aspect of the regina. No acute intracranial abnormality is identified.    Electronically signed by:  Haresh Frausto MD  5/2/2023 5:58 PM CDT Workstation: [id number]         ASSESSMENT & PLAN:    Acute ischemic stroke  Intracranial Microhemorrhages      Workup  CTH: No acute intracranial abnormality   CTA head and neck:  No acute large vessel occlusion or high-grade stenosis of intra or extracranial arteries  MRI brain:  Multiple areas of restricted diffusion involving the right posterior midbrain, left temporal lobe, left coronary radiata indicating acute infarcts.  GT:  pending       Plan  Admitted for further stroke workup  Start with Aspirin 81 mg and Lipitor 80mg.  Hold Plavix in the setting of multiple intracranial microhemorrhages with increased risk of intracranial hemorrhage.  Etiology of stroke is unknown however could be cardioembolic.  GT ordered and pending  If GT is negative, will get outpatient holter monitor for 4 weeks  Normalize BP  PT OT  Speech therapy  DVT prophylaxis with  chemo/SCD prophylaxis  Discussed lifestyle modifications as prophylactic measures for stroke prevention including adequate blood pressure management, healthy diet and regular exercise.          Thank you kindly for including us in the care of this patient. Please do not hesitate to contact us with any questions.        Geoffrey Roque MD  Neurology/vascular Neurology  Date of Service: 05/03/2023  9:56 AM    --------------------------------------------------------------------------------------------------------------------------------------------------------------------------------------------------------------------------------------------------------------  Please note: This note was transcribed using voice recognition software. Because of this technology there are often uinintended grammatical, spelling, and other transcription errors. Please disregard these errors.

## 2023-05-03 NOTE — H&P
[facility] Emergency Dept  Hospital Medicine  History & Physical    Patient Name: Steff Johnson  MRN: [id number]  Patient Class: IP- Inpatient  Admission Date: 5/2/2023  Attending Physician: Lorene Sharp MD   Primary Care Provider: Cristina Ren MD         Patient information was obtained from patient, relative(s), past medical records and ER records.     Subjective:     Principal Problem:Hypertensive emergency    Chief Complaint:   Chief Complaint   Patient presents with    Extremity Weakness     Right side weakness starting yesterday 1130,  states it went away and came back today after lunch.          HPI: Ms. Johnson is a 63-year-old right-handed female who was brought to the ED due to neurological symptoms.  Patient is a poor historian, only verbalized few words during my encounter, collateral from family present at bedside as well as ED provider.  Patient with recent [facility] admission, 4/26 to 4/28, presented with vision changes with slurred speech, treated for hypertensive emergency, MRI with acute right medial midbrain infarct, seen by Neurology, dual antiplatelet therapy, PT/OT recommended home health.  As per history, noted to have left facial droop with right upper extremity weakness, slurred speech with word-finding difficulties, reported initial episode started yesterday but somewhat improved, reoccurred around lunchtime (12:30 pm) today which prompted ED presentation.  Patient denies headache, swallowing difficulties, does reports vision abnormalities but unclear if new or chronic, does report weakness right upper extremity, she lives with her father at home who assist her, reports did take all medications this morning. Family is concerned about high salt diet. Patient is a non smoker.  BP on presentation 201/96.  CBC not available, discussed with lab, needs to be recollected, communicated with nursing.  Sodium 136, BUN/creatinine 27/1.2, INR 1, LDL 65, troponin 2.6, glucose  131.  Chest x-ray poor expansion with no acute abnormalities, CT head no acute hemorrhage, CTA no large vessel occlusion.  She was evaluated by tele stroke, Dr Goodman, out of window for tPA, MRI brain without contrast, continuing dual antiplatelet therapy, allowing for permissive hypertension up to 210/120.  Case discussed with ED provider who requested admission.  Plan of care was discussed with family members present at bedside.      Past Medical History:   Diagnosis Date    Arthritis     Complete tear of left rotator cuff 11/09/2017    COPD (chronic obstructive pulmonary disease)     COVID-19 infection 07/02/2021    Diabetes mellitus     H/o Cerebrovascular accident (CVA) of left pontine structure 12/12/2019    Hypertension     Personal history of colonic polyps     Stroke     Wears glasses        Past Surgical History:   Procedure Laterality Date    COLONOSCOPY N/A 4/11/2017    Procedure: COLONOSCOPY;  Surgeon: Jared Antonio MD;  Location: [facility];  Service: Endoscopy;  Laterality: N/A;    HYSTERECTOMY      OOPHORECTOMY      SHOULDER SURGERY      R       Review of patient's allergies indicates:  No Known Allergies    No current facility-administered medications on file prior to encounter.     Current Outpatient Medications on File Prior to Encounter   Medication Sig    amLODIPine (NORVASC) 10 MG tablet Take 1 tablet (10 mg total) by mouth once daily.    aspirin 81 MG Chew Take 1 tablet (81 mg total) by mouth once daily.    atorvastatin (LIPITOR) 40 MG tablet Take 1 tablet (40 mg total) by mouth once daily.    cloNIDine 0.1 mg/24 hr td ptwk (CATAPRES) 0.1 mg/24 hr Place 1 patch onto the skin every 7 days.    clopidogreL (PLAVIX) 75 mg tablet Take 1 tablet (75 mg total) by mouth once daily.    hydrALAZINE (APRESOLINE) 100 MG tablet Take 1 tablet (100 mg total) by mouth 3 (three) times daily.    insulin aspart U-100 (NOVOLOG) 100 unit/mL injection Inject into the skin 3 (three) times  "daily before meals.    insulin degludec (TRESIBA FLEXTOUCH U-100) 100 unit/mL (3 mL) insulin pen ADMINISTER 40 UNITS UNDER THE SKIN EVERY EVENING (Patient taking differently: Inject 40 Units into the skin every evening. ADMINISTER 40 UNITS UNDER THE SKIN EVERY EVENING)    losartan (COZAAR) 50 MG tablet Take 1 tablet (50 mg total) by mouth once daily.    metoprolol succinate (TOPROL-XL) 100 MG 24 hr tablet Take 1 tablet (100 mg total) by mouth once daily.    tiotropium (SPIRIVA) 18 mcg inhalation capsule Inhale 1 capsule (18 mcg total) into the lungs once daily. Controller    pen needle, diabetic (BD ULTRA-FINE SHORT PEN NEEDLE) 31 gauge x 5/16" Ndle 1 pen by Misc.(Non-Drug; Combo Route) route 4 (four) times daily.    [DISCONTINUED] blood-glucose meter (TRUE METRIX GLUCOSE METER) kit Use as instructed    [DISCONTINUED] brimonidine 0.2% (ALPHAGAN) 0.2 % Drop 1 drop every 8 (eight) hours.    [DISCONTINUED] chlorthalidone (HYGROTEN) 50 MG Tab Take 1 tablet (50 mg total) by mouth once daily.    [DISCONTINUED] lancing device (TRUEDRAW LANCING DEVICE) Misc Inject 1 Device into the skin 2 (two) times daily.    [DISCONTINUED] OZURDEX 0.7 mg Impl intravitreal implant 0.7 mg by Intravitreal route once.     Family History       Problem Relation (Age of Onset)    Anemia Daughter    Asthma Mother    Diabetes Mother, Father, Brother    Hypertension Mother, Brother          Tobacco Use    Smoking status: Never    Smokeless tobacco: Never   Substance and Sexual Activity    Alcohol use: No    Drug use: No    Sexual activity: Not Currently     Review of Systems   Constitutional:  Negative for fever.   HENT:  Negative for trouble swallowing.    Eyes:  Positive for visual disturbance.   Respiratory:  Negative for shortness of breath.    Cardiovascular:  Negative for chest pain.   Gastrointestinal:  Negative for vomiting.   Neurological:  Positive for facial asymmetry and speech difficulty. Negative for headaches. "   Objective:     Vital Signs (Most Recent):  Temp: 98.2 °F (36.8 °C) (05/02/23 1700)  Pulse: 62 (05/02/23 2000)  Resp: 15 (05/02/23 2000)  BP: (!) 193/88 (05/02/23 2000)  SpO2: 98 % (05/02/23 2000)   Vital Signs (24h Range):  Temp:  [98.2 °F (36.8 °C)] 98.2 °F (36.8 °C)  Pulse:  [62-65] 62  Resp:  [14-20] 15  SpO2:  [98 %-99 %] 98 %  BP: (184-203)/(82-96) 193/88     Weight: 59 kg (130 lb)  Body mass index is 26.26 kg/m².    Physical Exam  Vitals and nursing note reviewed.   Constitutional:       Appearance: She is not toxic-appearing or diaphoretic.      Comments: Chronically ill appearing, lying in stretcher   HENT:      Head: Normocephalic and atraumatic.      Mouth/Throat:      Mouth: Mucous membranes are moist.      Comments: No oral thrush  Eyes:      General:         Right eye: No discharge.         Left eye: No discharge.      Extraocular Movements: Extraocular movements intact.      Conjunctiva/sclera: Conjunctivae normal.      Pupils: Pupils are equal, round, and reactive to light.   Cardiovascular:      Rate and Rhythm: Normal rate and regular rhythm.   Pulmonary:      Effort: Pulmonary effort is normal. No respiratory distress.      Breath sounds: Normal breath sounds. No stridor. No wheezing or rhonchi.      Comments: On RA  Abdominal:      General: Bowel sounds are normal. There is no distension.      Palpations: Abdomen is soft.      Tenderness: There is no abdominal tenderness. There is no guarding.   Genitourinary:     Comments: Wearing diaper, pure wick catheter in place  Musculoskeletal:      Cervical back: Neck supple.   Skin:     Comments: Superficial abrasion right anterior leg    Neurological:      Mental Status: She is alert.      Comments: Alert, only verbalized few words, slow to respond, no dysarthria appreciated, following simple commands all four extremities, lifting bilateral LE against gravity about 20 degrees, power 4+/5, RUE power 4/5, + drift present, LUE power 5/5, left facial droop  present, decreased sensation extremities throughout subjectively   Psychiatric:      Comments: calm         CRANIAL NERVES     CN III, IV, VI   Pupils are equal, round, and reactive to light.     Significant Labs: BMP:   Recent Labs   Lab 05/02/23  1750   *      K 4.6      CO2 20*   BUN 27*   CREATININE 1.2   CALCIUM 9.2     CBC: No results for input(s): WBC, HGB, HCT, PLT in the last 48 hours.  CMP:   Recent Labs   Lab 05/02/23  1750      K 4.6      CO2 20*   *   BUN 27*   CREATININE 1.2   CALCIUM 9.2   PROT 7.6   ALBUMIN 3.7   BILITOT 0.7   ALKPHOS 71   AST 24   ALT 24   ANIONGAP 8     Cardiac Markers: No results for input(s): CKMB, MYOGLOBIN, BNP, TROPISTAT in the last 48 hours.  Lactic Acid: No results for input(s): LACTATE in the last 48 hours.  Magnesium: No results for input(s): MG in the last 48 hours.  POCT Glucose: No results for input(s): POCTGLUCOSE in the last 48 hours.  Troponin:   Recent Labs   Lab 05/02/23  1750   TROPONINIHS 2.6     TSH: No results for input(s): TSH in the last 4320 hours.  Urine Culture: No results for input(s): LABURIN in the last 48 hours.  Urine Studies: No results for input(s): COLORU, APPEARANCEUA, PHUR, SPECGRAV, PROTEINUA, GLUCUA, KETONESU, BILIRUBINUA, OCCULTUA, NITRITE, UROBILINOGEN, LEUKOCYTESUR, RBCUA, WBCUA, BACTERIA, SQUAMEPITHEL, HYALINECASTS in the last 48 hours.    Invalid input(s): WRIGHTSUR    Significant Imaging: I have reviewed all pertinent imaging results/findings within the past 24 hours.      CT HEAD FOR STROKE    Result Date: 5/2/2023  EXAM: CT HEAD FOR STROKE HISTORY: Neuro deficit, acute, stroke suspected COMPARISON: CT scan of the head dated 4/26/2023 TECHNIQUE: Multiple axial 3.0 mm thick images were acquired from the base of the skull to the vertex without IV contrast. This exam was performed according to our departmental dose-optimization program, which includes automated exposure control, adjustment of the mA  and/or kV according to patient size and/or use of iterative reconstruction technique. Unless otherwise stated, incidental findings do not require dedicated follow-up imaging. FINDINGS: There is patchy ill-defined abnormal diminished attenuation throughout the white matter of both cerebral hemispheres that appears similar on the previous CT scan and is consistent with sequela of extensive small vessel chronic ischemic change. No definite acute infarcts are identified. There is no acute intracranial hemorrhage. There is a small chronic infarct in the medial aspect of the left frontal lobe in the vascular territory of the left anterior cerebral artery that is unchanged. There is also small chronic infarct in the posterior aspect of the regina that is unchanged. This was better shown on the MRI study dated 4/27/2023 which also showed a punctate acute lacunar infarct in the far superior aspect of the midbrain on the right. There are no discrete intracranial masses. The paranasal sinuses and mastoid air cells and middle ear cavities are well aerated. IMPRESSION:   Evidence of extensive small vessel chronic ischemic change as described. Small chronic lacunar infarct in the posterior aspect of the regina. No acute intracranial abnormality is identified. Electronically signed by:  Haresh Frausto MD  5/2/2023 5:58 PM CDT Workstation: [id number]    Echo Saline Bubble? No    Result Date: 4/28/2023  · The left ventricle is normal in size with normal systolic function. · Indeterminate left ventricular diastolic function. · The estimated ejection fraction is 62%. · Normal right ventricular size     CTA Head and Neck (xpd)    Result Date: 5/2/2023  EXAM: CTA HEAD AND NECK (XPD) HISTORY: Stroke/TIA, determine embolic source COMPARISON: CTA of the head and neck dated 4/27/2023. MRI of the brain dated 4/27/2023. TECHNIQUE: Multiple axial 1 mm thick images were obtained through the neck and head during rapid IV injection of contrast.  Coronal and sagittal reconstructions were produced. This exam was performed according to our departmental dose-optimization program, which includes automated exposure control, adjustment of the mA and/or kV according to patient size and/or use of iterative reconstruction technique. Unless otherwise stated, incidental findings do not require dedicated follow-up imaging. FINDINGS: There is normal branching of the great vessels from the aortic arch that all appear widely patent. No stenoses are evident within either common or external or internal carotid artery within the neck. The carotid bifurcations are normal. The vertebral arteries are codominant and are both widely patent throughout their course neck and head. No atherosclerotic disease is evident within the intracranial arterial vasculature. There are no significant stenoses or focal intracranial occlusions. No intentional embolic source is demonstrated. Note that review of the patient's MRI shows evidence of extensive small vessel chronic ischemic change and also extensive amyloid angiopathy which likely accounts for the patient's infarcts. The overall appearance is not typical of embolic infarcts. That study showed a punctate acute ischemic infarct in the superior aspect of the midbrain to the right of midline that is quite likely not be embolic in etiology. There are no aneurysms. Normal enhancement is observed in the dural venous sinuses. There is no abnormal enhancement within the brain parenchyma or brainstem. IMPRESSION:   Unremarkable CT angiography imaging of the neck and head. There is essentially no evidence of atherosclerotic disease. There are no significant stenoses or focal occlusions within the arterial vasculature of the neck or head. See above discussion. Electronically signed by:  Haresh Frausto MD  5/2/2023 6:11 PM CDT Workstation: [id number]        Assessment/Plan:     Active Hospital Problems    Diagnosis    *Hypertensive emergency     Acute ischemic stroke     CKD (chronic kidney disease), stage III    History of stroke    Frequent falls    Type 2 diabetes mellitus with both eyes affected by moderate nonproliferative retinopathy and macular edema, with long-term current use of insulin    Type 2 diabetes mellitus with diabetic nephropathy, HbA1c 8.9%    Mixed hyperlipidemia    Hypertension associated with diabetes     Plan:  Admit inpatient, telemetry floor, continuous cardiac monitoring  Neurochecks q.4 hours   MRI brain without contrast, rule out ischemic CVA   Recent transthoracic echocardiogram EF of 62%, echo 5/2022 no evidence of shunting   CT does report possible changes of amyloid angiopathy, seen by Neurology last admission, recommends continuing dual antiplatelet therapy, monitoring closely  Allow for permissive hypertension up to 220/120, hold home losartan, hydralazine, amlodipine.  Continue clonidine patch to prevent rebound   LDL 65, check HbA1c (previous 8.9%)   Basal bolus insulin with Accu-Cheks, as needed hypoglycemic measures   Fall and delirium precautions  Renally dosing all medications and avoiding nephrotoxin drugs   PT/OT/ST evaluation as per stroke protocol   A.m. labs ordered  Neurology consulted   Further plan as per hospital course    VTE Risk Mitigation (From admission, onward)         Ordered     enoxaparin injection 40 mg  Daily         05/02/23 2031     IP VTE HIGH RISK PATIENT  Once         05/02/23 2031     Place sequential compression device  Until discontinued         05/02/23 2031                           Lorene Sharp MD  Department of Hospital Medicine  [facility] - Emergency Dept

## 2023-05-03 NOTE — CONSULTS
[facility]  Department of Cardiology  Consult Note      PATIENT NAME: Steff Johnson    MRN: [id number]  TODAY'S DATE: 05/03/2023  ADMIT DATE: 5/2/2023                          CONSULT REQUESTED BY: Vilma Rodriguez MD    SUBJECTIVE     PRINCIPAL PROBLEM: Hypertensive emergency      REASON FOR CONSULT: GT requested    From H&P: Ms. Johnson is a 63-year-old right-handed female who was brought to the ED due to neurological symptoms.  Patient is a poor historian, only verbalized few words during my encounter, collateral from family present at bedside as well as ED provider.  Patient with recent [facility] admission, 4/26 to 4/28, presented with vision changes with slurred speech, treated for hypertensive emergency, MRI with acute right medial midbrain infarct, seen by Neurology, dual antiplatelet therapy, PT/OT recommended home health.  As per history, noted to have left facial droop with right upper extremity weakness, slurred speech with word-finding difficulties, reported initial episode started yesterday but somewhat improved, reoccurred around lunchtime (12:30 pm) today which prompted ED presentation.  Patient denies headache, swallowing difficulties, does reports vision abnormalities but unclear if new or chronic, does report weakness right upper extremity, she lives with her father at home who assist her, reports did take all medications this morning. Family is concerned about high salt diet. Patient is a non smoker.  BP on presentation 201/96.  CBC not available, discussed with lab, needs to be recollected, communicated with nursing.  Sodium 136, BUN/creatinine 27/1.2, INR 1, LDL 65, troponin 2.6, glucose 131.  Chest x-ray poor expansion with no acute abnormalities, CT head no acute hemorrhage, CTA no large vessel occlusion.  She was evaluated by tele stroke, Dr Goodman, out of window for tPA, MRI brain without contrast, continuing dual antiplatelet therapy, allowing for permissive hypertension up to  210/120.  Case discussed with ED provider who requested admission.  Plan of care was discussed with family members present at bedside.         HPI:    Patient is a 63 year old female who presented to the Er with complaints of visual changes. Patient noted to have significantly elevated Bp on arrival and also was noted to have acute ischemic infarct. Cardiology was consulted for GT. She was seen sitting up in chair and able to discuss procedure with us.       Review of patient's allergies indicates:  No Known Allergies    Past Medical History:   Diagnosis Date    Arthritis     Complete tear of left rotator cuff 11/09/2017    COPD (chronic obstructive pulmonary disease)     COVID-19 infection 07/02/2021    Diabetes mellitus     H/o Cerebrovascular accident (CVA) of left pontine structure 12/12/2019    Hypertension     Personal history of colonic polyps     Stroke     Wears glasses      Past Surgical History:   Procedure Laterality Date    COLONOSCOPY N/A 4/11/2017    Procedure: COLONOSCOPY;  Surgeon: Jared Antonio MD;  Location: [facility];  Service: Endoscopy;  Laterality: N/A;    HYSTERECTOMY      OOPHORECTOMY      SHOULDER SURGERY      R     Social History     Tobacco Use    Smoking status: Never    Smokeless tobacco: Never   Substance Use Topics    Alcohol use: No    Drug use: No        REVIEW OF SYSTEMS  CONSTITUTIONAL: Negative for chills, fatigue and fever.   NEURO: No headaches, No dizziness; + right hand weakness   RESPIRATORY: Negative for cough, shortness of breath and wheezing.    CARDIOVASCULAR: Negative for chest pain. Negative for palpitations and leg swelling.   GI: Negative for abdominal pain, No melena, diarrhea, nausea and vomiting.   : Negative for dysuria and frequency, Negative for hematuria  SKIN: Negative for bruising, Negative for edema or discoloration noted.   PSYCHIATRIC: Negative for depression, Negative for anxiety, Negative for memory loss  MUSCULOSKELETAL: Negative for neck pain,  Negative for muscle weakness, Negative for back pain     OBJECTIVE     VITAL SIGNS (Most Recent)  Temp: 97.8 °F (36.6 °C) (05/03/23 0809)  Pulse: 61 (05/03/23 0809)  Resp: 17 (05/03/23 0809)  BP: (!) 193/88 (nurse jewel notified) (05/03/23 0809)  SpO2: 95 % (05/03/23 0809)    VENTILATION STATUS  Resp: 17 (05/03/23 0809)  SpO2: 95 % (05/03/23 0809)           I & O (Last 24H):  Intake/Output Summary (Last 24 hours) at 5/3/2023 1121  Last data filed at 5/3/2023 1029  Gross per 24 hour   Intake 400 ml   Output 300 ml   Net 100 ml       WEIGHTS  Wt Readings from Last 1 Encounters:   05/02/23 2230 61.1 kg (134 lb 11.2 oz)   05/02/23 1700 59 kg (130 lb)       PHYSICAL EXAM  CONSTITUTIONAL: No fever, no chills  HEENT: Normocephalic, atraumatic,pupils reactive to light                 NECK:  No JVD no carotid bruit  CVS: S1S2+, RRR  LUNGS: Clear  ABDOMEN: Soft, NT, BS+  EXTREMITIES: No cyanosis, edema  : No del rosario catheter  NEURO: AAO X 3  PSY: Normal affect      HOME MEDICATIONS:  No current facility-administered medications on file prior to encounter.     Current Outpatient Medications on File Prior to Encounter   Medication Sig Dispense Refill    amLODIPine (NORVASC) 10 MG tablet Take 1 tablet (10 mg total) by mouth once daily. 30 tablet 0    aspirin 81 MG Chew Take 1 tablet (81 mg total) by mouth once daily. 30 tablet 0    atorvastatin (LIPITOR) 40 MG tablet Take 1 tablet (40 mg total) by mouth once daily. 90 tablet 1    cloNIDine 0.1 mg/24 hr td ptwk (CATAPRES) 0.1 mg/24 hr Place 1 patch onto the skin every 7 days. 12 patch 1    clopidogreL (PLAVIX) 75 mg tablet Take 1 tablet (75 mg total) by mouth once daily. 90 tablet 1    dexAMETHasone (OZURDEX) 0.7 mg Impl intravitreal implant 0.7 mg by Intravitreal route once.      hydrALAZINE (APRESOLINE) 100 MG tablet Take 1 tablet (100 mg total) by mouth 3 (three) times daily. 270 tablet 1    insulin aspart U-100 (NOVOLOG) 100 unit/mL injection Inject into the skin 3 (three)  "times daily before meals.      insulin degludec (TRESIBA FLEXTOUCH U-100) 100 unit/mL (3 mL) insulin pen ADMINISTER 40 UNITS UNDER THE SKIN EVERY EVENING (Patient taking differently: Inject 40 Units into the skin every evening. ADMINISTER 40 UNITS UNDER THE SKIN EVERY EVENING) 12 pen 1    losartan (COZAAR) 50 MG tablet Take 1 tablet (50 mg total) by mouth once daily. 90 tablet 1    metoprolol succinate (TOPROL-XL) 100 MG 24 hr tablet Take 1 tablet (100 mg total) by mouth once daily. 90 tablet 1    tiotropium (SPIRIVA) 18 mcg inhalation capsule Inhale 1 capsule (18 mcg total) into the lungs once daily. Controller 90 capsule 3    pen needle, diabetic (BD ULTRA-FINE SHORT PEN NEEDLE) 31 gauge x 5/16" Ndle 1 pen by Misc.(Non-Drug; Combo Route) route 4 (four) times daily. 300 each 5    [DISCONTINUED] blood-glucose meter (TRUE METRIX GLUCOSE METER) kit Use as instructed 1 each 0    [DISCONTINUED] chlorthalidone (HYGROTEN) 50 MG Tab Take 1 tablet (50 mg total) by mouth once daily. 90 tablet 3    [DISCONTINUED] lancing device (TRUEDRAW LANCING DEVICE) Misc Inject 1 Device into the skin 2 (two) times daily. 1 each 3       SCHEDULED MEDS:   aspirin  81 mg Oral Daily    atorvastatin  40 mg Oral QHS    cloNIDine 0.1 mg/24 hr td ptwk  1 patch Transdermal Q7 Days    enoxaparin  40 mg Subcutaneous Daily    insulin detemir U-100 (Levemir)  40 Units Subcutaneous QHS    ipratropium  0.5 mg Nebulization Q6H    metoprolol succinate  100 mg Oral Daily       CONTINUOUS INFUSIONS:    PRN MEDS:acetaminophen, dextrose 50%, glucagon (human recombinant), insulin aspart U-100, labetalol, ondansetron, senna-docusate 8.6-50 mg, sodium chloride 0.9%    LABS AND DIAGNOSTICS     CBC LAST 3 DAYS  Recent Labs   Lab 04/26/23  2149 04/28/23  0324 05/02/23  2254 05/03/23  0343   WBC 9.16 9.98 10.47 10.38   RBC 4.42 4.33 4.54 4.50   HGB 12.4 12.2 12.6 12.6   HCT 35.1* 34.4* 35.9* 35.9*   MCV 79* 79* 79* 80*   MCH 28.1 28.2 27.8 28.0   MCHC 35.3 35.5 35.1 " 35.1   RDW 14.8* 14.8* 15.2* 15.2*    412 433 452*   MPV 11.1 10.4 10.3 10.8   GRAN 51.4  4.7  --  62.5  6.5  --    LYMPH 34.8  3.2  --  25.6  2.7  --    MONO 10.6  1.0  --  9.8  1.0  --    BASO 0.06  --  0.05  --    NRBC 0  --  0  --        COAGULATION LAST 3 DAYS  Recent Labs   Lab 05/02/23 1750   INR 1.0       CHEMISTRY LAST 3 DAYS  Recent Labs   Lab 04/26/23 2149 04/27/23  1628 04/28/23  0324 05/02/23  1750 05/03/23  0343      < > 136 136 139   K 4.0   < > 3.8 4.6 4.0      < > 106 108 111*   CO2 26   < > 21* 20* 21*   ANIONGAP 8   < > 9 8 7*   BUN 19   < > 31* 27* 23   CREATININE 1.1   < > 1.4 1.2 1.0   *   < > 289* 121* 91   CALCIUM 9.1   < > 9.0 9.2 9.1   MG 1.8  --   --   --  2.0   ALBUMIN 3.6  --   --  3.7  --    PROT 7.3  --   --  7.6  --    ALKPHOS 81  --   --  71  --    ALT 19  --   --  24  --    AST 19  --   --  24  --    BILITOT 0.5  --   --  0.7  --     < > = values in this interval not displayed.       CARDIAC PROFILE LAST 3 DAYS  Recent Labs   Lab 04/26/23 2149 04/27/23  0117 05/02/23 1750   BNP 25  --   --    TROPONINIHS 4.0 4.4 2.6       ENDOCRINE LAST 3 DAYS  Recent Labs   Lab 05/02/23  1750   TSH 4.180       LAST ARTERIAL BLOOD GAS  ABG  No results for input(s): PH, PO2, PCO2, HCO3, BE in the last 168 hours.    LAST 7 DAYS MICROBIOLOGY   Microbiology Results (last 7 days)       ** No results found for the last 168 hours. **            MOST RECENT IMAGING  MRI Brain Without Contrast  MR BRAIN WITHOUT IV CONTRAST    Comparisons: Brain MRI with and without contrast dated April 27, 2023    Additional pertinent history: Stroke    TECHNIQUE:  Multiplanar, multisequence brain MRI was performed without gadolinium contrast.    FINDINGS:    Sagittal midline structures and craniocervical junction: Negative.    Midline shift: None.    Ventricles: Negative.    Brain parenchyma:  Diffusion weighted imaging: Tiny region of restricted diffusion involving the posterior aspects  of the right midbrain as well as a linear region of restricted diffusion within the mid left temporal lobe.  Gradient sequence: Continued findings of multiple foci of magnetic susceptibility within the brain parenchyma in a distribution similar to the previous exam with differential considerations including chronic small microvascular hemorrhages related to hypertensive episodes versus amyloid angiopathy.  T2 weighted FLAIR images: Patchy hypoattenuation within the periventricular and subcortical white matter, nonspecific but likely representing small vessel ischemic change on a chronic basis.    Extra-axial spaces: Mild cerebral atrophy.    Dural venous sinuses and major arterial flow voids: Negative.    Mastoid air cells and paranasal sinuses: Negative.    Surrounding soft tissues and orbits: Negative.    Impression:  1. Superimposed upon chronic age-related changes and multiple regions of either chronic microhemorrhage or amyloid angiopathy are findings of 2 small regions of acute infarct within the right posterior midbrain in the mid left temporal lobe.  2. Other age related changes as described above.    Electronically signed by:  Elieser Preciado MD  5/2/2023 10:59 PM CDT Workstation: [id number]  X-Ray Chest AP Portable  EXAM: XR CHEST AP PORTABLE    HISTORY: Stroke. Possible aspiration.    COMPARISON:Chest x-ray dated 4/26/2023    FINDINGS: The lungs are somewhat poorly inflated. There is mild linear atelectasis in the left midlung zone. No acute focal infiltrates are identified. There are no pleural effusions. The cardiomediastinal silhouette is unremarkable.    IMPRESSION:   Poor lung expansion. No evidence of acute disease within the chest.    Electronically signed by:  Haresh Frausto MD  5/2/2023 7:05 PM CDT Workstation: [id number]  CTA Head and Neck (xpd)  EXAM: CTA HEAD AND NECK (XPD)    HISTORY: Stroke/TIA, determine embolic source    COMPARISON: CTA of the head and neck dated 4/27/2023. MRI of the brain  dated 4/27/2023.    TECHNIQUE: Multiple axial 1 mm thick images were obtained through the neck and head during rapid IV injection of contrast. Coronal and sagittal reconstructions were produced.    This exam was performed according to our departmental dose-optimization program, which includes automated exposure control, adjustment of the mA and/or kV according to patient size and/or use of iterative reconstruction technique.    Unless otherwise stated, incidental findings do not require dedicated follow-up imaging.    FINDINGS: There is normal branching of the great vessels from the aortic arch that all appear widely patent. No stenoses are evident within either common or external or internal carotid artery within the neck. The carotid bifurcations are normal. The vertebral arteries are codominant and are both widely patent throughout their course neck and head. No atherosclerotic disease is evident within the intracranial arterial vasculature. There are no significant stenoses or focal intracranial occlusions. No intentional embolic source is demonstrated. Note that review of the patient's MRI shows evidence of extensive small vessel chronic ischemic change and also extensive amyloid angiopathy which likely accounts for the patient's infarcts. The overall appearance is not typical of embolic infarcts. That study showed a punctate acute ischemic infarct in the superior aspect of the midbrain to the right of midline that is quite likely not be embolic in etiology. There are no aneurysms. Normal enhancement is observed in the dural venous sinuses. There is no abnormal enhancement within the brain parenchyma or brainstem.    IMPRESSION:   Unremarkable CT angiography imaging of the neck and head. There is essentially no evidence of atherosclerotic disease. There are no significant stenoses or focal occlusions within the arterial vasculature of the neck or head. See above discussion.    Electronically signed by:  Haresh  Lisbet PALOMO  5/2/2023 6:11 PM CDT Workstation: [id number]  CT HEAD FOR STROKE  EXAM: CT HEAD FOR STROKE    HISTORY: Neuro deficit, acute, stroke suspected    COMPARISON: CT scan of the head dated 4/26/2023    TECHNIQUE: Multiple axial 3.0 mm thick images were acquired from the base of the skull to the vertex without IV contrast.    This exam was performed according to our departmental dose-optimization program, which includes automated exposure control, adjustment of the mA and/or kV according to patient size and/or use of iterative reconstruction technique.    Unless otherwise stated, incidental findings do not require dedicated follow-up imaging.    FINDINGS: There is patchy ill-defined abnormal diminished attenuation throughout the white matter of both cerebral hemispheres that appears similar on the previous CT scan and is consistent with sequela of extensive small vessel chronic ischemic change. No definite acute infarcts are identified. There is no acute intracranial hemorrhage. There is a small chronic infarct in the medial aspect of the left frontal lobe in the vascular territory of the left anterior cerebral artery that is unchanged. There is also small chronic infarct in the posterior aspect of the regina that is unchanged. This was better shown on the MRI study dated 4/27/2023 which also showed a punctate acute lacunar infarct in the far superior aspect of the midbrain on the right. There are no discrete intracranial masses. The paranasal sinuses and mastoid air cells and middle ear cavities are well aerated.    IMPRESSION:   Evidence of extensive small vessel chronic ischemic change as described. Small chronic lacunar infarct in the posterior aspect of the regina. No acute intracranial abnormality is identified.    Electronically signed by:  Haresh Frausto MD  5/2/2023 5:58 PM CDT Workstation: [id number]      ECHOCARDIOGRAM RESULTS (last 5)  Results for orders placed during the hospital encounter of  04/26/23    Echo Saline Bubble? No    Interpretation Summary  · The left ventricle is normal in size with normal systolic function.  · Indeterminate left ventricular diastolic function.  · The estimated ejection fraction is 62%.  · Normal right ventricular size with normal right ventricular systolic function.  · Normal central venous pressure (3 mmHg).      Results for orders placed during the hospital encounter of 05/18/22    Echo Saline Bubble? No    Interpretation Summary  · There is no evidence of intracardiac shunting.  · The left ventricle is normal in size with normal systolic function.  · The estimated ejection fraction is 65%.  · Indeterminate left ventricular diastolic function.  · Normal right ventricular size with normal right ventricular systolic function.  · No interatrial septal defect present.  · Mild aortic regurgitation.      Results for orders placed during the hospital encounter of 07/02/21    Echo    Interpretation Summary  · Normal central venous pressure (3 mmHg).  · The left ventricle is normal in size with mild concentric hypertrophy and normal systolic function.  · The estimated ejection fraction is 53%.  · Grade I left ventricular diastolic dysfunction.  · Normal right ventricular size with normal right ventricular systolic function.      Results for orders placed during the hospital encounter of 12/12/19    Echo Color Flow Doppler? Yes; Bubble Contrast? Yes    Interpretation Summary  · Concentric left ventricular remodeling.  · Normal left ventricular systolic function. The estimated ejection fraction is 54%  · Grade I (mild) left ventricular diastolic dysfunction consistent with impaired relaxation.  · Normal right ventricular systolic function.  · No interatrial septal defect present.  · Normal central venous pressure (3 mm Hg).  · No PFO on color flow and saline bubble study      CURRENT/PREVIOUS VISIT EKG  Results for orders placed or performed during the hospital encounter of  04/26/23   EKG 12-lead    Collection Time: 04/26/23  9:30 PM    Narrative    Test Reason : R07.9,    Vent. Rate : 071 BPM     Atrial Rate : 071 BPM     P-R Int : 136 ms          QRS Dur : 076 ms      QT Int : 434 ms       P-R-T Axes : 065 038 115 degrees     QTc Int : 471 ms    Normal sinus rhythm  Nonspecific T wave abnormality  Abnormal ECG  When compared with ECG of 18-MAY-2022 16:58,  No significant change was found    Referred By: [email or web address]   SELF           Confirmed By:            ASSESSMENT/PLAN:     Active Hospital Problems    Diagnosis    *Hypertensive emergency    Acute ischemic stroke     CKD (chronic kidney disease), stage III    History of stroke    Frequent falls    Type 2 diabetes mellitus with both eyes affected by moderate nonproliferative retinopathy and macular edema, with long-term current use of insulin    Type 2 diabetes mellitus with diabetic nephropathy, HbA1c 8.9%    Mixed hyperlipidemia    Hypertension associated with diabetes       ASSESSMENT & PLAN:     Acute ischemic CVA  HTN emergency  CKD stage III  Type II DM  Falls  Hx of CVA      RECOMMENDATIONS:    Risks of GT were discussed with patient the risks include but not limited to oropharyngeal trauma, dental trauma, trauma to the esophagus, possible aspiration, and reaction to anesthesia. She is agreeable to proceed.  Keep NPO after midnight.   Bp improving but was still very high overnight. Managed by primary team.       Tonia Tobar NP  Date of Service: 05/03/2023  11:21 AM     I have personally interviewed and examined the patient, I have reviewed the Nurse Practitioner's history and physical, assessment, and plan. I have personally evaluated the patient at bedside and agree with the findings and made appropriate changes as necessary in recommendations.    PATIENT WITH RECURRENT STROKES POSSIBLY EMBOLIC.  DISCUSSED GT PROCEDURE AND RISKS INCLUDING DISRUPTION OF TEETH ESOPHAGEAL TEAR BLEEDING OR PERFORATION.  SHE AGREES TO  PROCEED    Blade Phillip MD  Department of Cardiology  [facility]  05/03/2023 11:22 AM

## 2023-05-03 NOTE — PLAN OF CARE
05/03/23 0746   Patient Assessment/Suction   Level of Consciousness (AVPU) alert   Respiratory Effort Normal;Unlabored   All Lung Fields Breath Sounds clear   PRE-TX-O2   Device (Oxygen Therapy) room air   SpO2 98 %   Pulse Oximetry Type Intermittent   $ Pulse Oximetry - Multiple Charge Pulse Oximetry - Multiple   Pulse 63   Resp 18   Aerosol Therapy   $ Aerosol Therapy Charges Aerosol Treatment   Daily Review of Necessity (SVN) completed   Respiratory Treatment Status (SVN) given   Treatment Route (SVN) mask;oxygen   Patient Position (SVN) Berman's;HOB elevated   Post Treatment Assessment (SVN) increased aeration   Signs of Intolerance (SVN) none   Education   $ Education Bronchodilator;15 min   Respiratory Evaluation   $ Care Plan Tech Time 15 min

## 2023-05-03 NOTE — PLAN OF CARE
[facility]  Initial Discharge Assessment       Primary Care Provider: Cristina Ren MD    Admission Diagnosis: Cerebrovascular accident (CVA), unspecified mechanism [I63.9]    Admission Date: 5/2/2023  Expected Discharge Date:     Discharge Barriers Identified: None    Assessment completed at bedside with patient and significant other, Nish Marti [phone].  Patient intends to discharge home and would prefer outpt PT/OT if needed.  Cm to follow for needs.    Payor: [employer] MEDICARE / Plan: [employer] H[employer] PPO SPECIAL NEEDS / Product Type: Medicare Advantage /     Extended Emergency Contact Information  Primary Emergency Contact: Nish Marti  Home Phone: [phone]  Mobile Phone: [phone]  Relation: Friend   needed? No  Secondary Emergency Contact: Gita Johnson  Address: [address] [place] [address] [facility]  Home Phone: [phone]  Mobile Phone: [phone]  Relation: Sister  Preferred language: English   needed? No    Discharge Plan A: Home  Discharge Plan B: Home with [facility] - [facility] [address] [facility]  [address] [facility] [address]  Phone: [phone] Fax: [phone]    [facility] [address] [address]  Phone: [phone] Fax: [phone]    [id number] [place] [address] [address]  AT [facility] & [address] [address]  Phone: [phone] Fax: [phone]      Initial Assessment (most recent)       Adult Discharge Assessment - 05/03/23 1059          Discharge Assessment    Assessment Type Discharge Planning Assessment     Confirmed/corrected address, phone number and insurance Yes     Confirmed Demographics Correct on Facesheet     Source of Information patient;other (see comments)   Nish Marti [phone]    When was your last doctors appointment? --    April    Communicated ALEX with patient/caregiver Date not available/Unable to determine     Reason For Admission CVA     People in Home significant other     Facility Arrived From: home     Do you expect to return to your current living situation? Yes     Do you have help at home or someone to help you manage your care at home? Yes     Who are your caregiver(s) and their phone number(s)? Nish Marti [phone]     Prior to hospitilization cognitive status: Alert/Oriented     Current cognitive status: Alert/Oriented     Walking or Climbing Stairs ambulation difficulty, requires equipment     Mobility Management walker     Dressing/Bathing bathing difficulty, requires equipment     Dressing/Bathing Management Nish Marti [phone]     Equipment Currently Used at Home bedside commode;shower chair;rollator;wheelchair;cane, straight     Readmission within 30 days? Yes     Patient currently being followed by outpatient case management? No     Do you currently have service(s) that help you manage your care at home? No     Do you take prescription medications? Yes     Do you have prescription coverage? Yes     Coverage [employer]     Do you have any problems affording any of your prescribed medications? No     Is the patient taking medications as prescribed? yes     Who is going to help you get home at discharge? Nish Marti [phone]     How do you get to doctors appointments? family or friend will provide     Are you on dialysis? No     Do you take coumadin? No     Discharge Plan A Home     Discharge Plan B Home with family     DME Needed Upon Discharge  none     Discharge Plan discussed with: Spouse/sig other;Patient     Name(s) and Number(s) Nish Marti [phone]     Discharge Barriers Identified None

## 2023-05-03 NOTE — PT/OT/SLP EVAL
Occupational Therapy   Evaluation    Name: Steff Johnson  MRN: [id number]  Admitting Diagnosis: Hypertensive emergency  Recent Surgery: * No surgery found *      Recommendations:     Discharge Recommendations: outpatient OT (needs 24/7 SPV at home due to high fall risk and cognitive deficits)  Discharge Equipment Recommendations:  wheelchair, rollator, shower chair  Barriers to discharge:  Other (Comment) (needs assist with ADLs/functional mobility)    Assessment:     Steff Johnson is a 63 y.o. female with a medical diagnosis of Hypertensive emergency.  Performance deficits affecting function: weakness, impaired endurance, impaired self care skills, impaired functional mobility, gait instability, impaired balance, visual deficits, impaired cognition, decreased coordination, decreased upper extremity function, decreased safety awareness, impaired coordination, impaired fine motor, impaired cardiopulmonary response to activity.      Pt presents with increased RUE weakness and c/o visual changes in her right eye.      Rehab Prognosis: Good; patient would benefit from acute skilled OT services to address these deficits and reach maximum level of function.       Plan:     Patient to be seen 5 x/week to address the above listed problems via self-care/home management, therapeutic activities, therapeutic exercises, neuromuscular re-education, cognitive retraining, wheelchair management/training  Plan of Care Expires: 06/02/23  Plan of Care Reviewed with: patient    Subjective     Patient/Family Comments/goals: to return home    Occupational Profile:  Living Environment: Lives with her friend in a mobile home with ~4STE with a railing.  Pt has a tub/shower combination with a shower chair and standard height toilets.   Previous level of function: Mod I with ADLs/functional mobility; uses either rollator or w/c for mobility depending how she feels; friend completes IADLs and provides transportation.  Roles and Routines:  "limited homemaker  Equipment Used at Home:  wheelchair, rollator, shower chair  Assistance upon Discharge: friend     Pain/Comfort:  Pain Rating 1: 0/10  Pain Rating Post-Intervention 1: 0/10    Objective:     Communicated with: nurse prior to session.  Patient found up in chair with telemetry upon OT entry to room.    General Precautions: Standard, fall, aspiration    Occupational Performance:    Activities of Daily Living:  Feeding:  set-up assistance due to impaired FM coordination in right hand    Cognitive/Visual Perceptual:  Cognitive/Psychosocial Skills:     -       Oriented to: Person, Place, Time, and Situation   -       Follows Commands/attention:Follows two-step commands  -       Communication: dysarthria  -       Safety awareness/insight to disability: impaired   Visual/Perceptual:      -Impaired  pt reports impaired vision in right eye that "comes and goes"     Physical Exam:  Sensation:    -       Intact  Dominant hand:    -       Right  Upper Extremity Range of Motion:     -       Right Upper Extremity: WFL passively but limited AROM at shoulder actively; WFL distally  -       Left Upper Extremity: WFL  Upper Extremity Strength:    -       Right Upper Extremity:  2+ at shoulder; 3+ distally  -       Left Upper Extremity:  5/5   Strength:    -       Right Upper Extremity:  fair  -       Left Upper Extremity:  good  Fine Motor Coordination:    -       Impaired  Right hand, manipulation of objects    Gross motor coordination:   WFL    AMPAC 6 Click ADL:  AMPAC Total Score: 20    Treatment & Education:  Instructed patient to sit in bedside chair as tolerated daily to increase OOB/activity tolerance.   Instructed patient to use call light to have nursing staff assist with needs/transfers.   Discussed OT POC and answered all questions within OT scope of practice.    Patient left up in chair with all lines intact, call button in reach, and chair alarm on    GOALS:   Multidisciplinary Problems       " Occupational Therapy Goals          Problem: Occupational Therapy    Goal Priority Disciplines Outcome Interventions   Occupational Therapy Goal     OT, PT/OT     Description: Goals to be met by: 6/2/23     Patient will increase functional independence with ADLs by performing:    UE Dressing with Supervision.  LE Dressing with Supervision.  Grooming while seated with Supervision.  Toileting from toilet with Supervision for hygiene and clothing management.   Toilet transfer to toilet with Supervision.                         History:     Past Medical History:   Diagnosis Date    Arthritis     Complete tear of left rotator cuff 11/09/2017    COPD (chronic obstructive pulmonary disease)     COVID-19 infection 07/02/2021    Diabetes mellitus     H/o Cerebrovascular accident (CVA) of left pontine structure 12/12/2019    Hypertension     Personal history of colonic polyps     Stroke     Wears glasses          Past Surgical History:   Procedure Laterality Date    COLONOSCOPY N/A 4/11/2017    Procedure: COLONOSCOPY;  Surgeon: Jared Antonio MD;  Location: [facility];  Service: Endoscopy;  Laterality: N/A;    HYSTERECTOMY      OOPHORECTOMY      SHOULDER SURGERY      R       Time Tracking:     OT Date of Treatment: 05/03/23  OT Start Time: 1230  OT Stop Time: 1245  OT Total Time (min): 15 min    Billable Minutes:Evaluation 07  Self Care/Home Management 08    5/3/2023

## 2023-05-03 NOTE — PROGRESS NOTES
[facility] Medicine  Progress Note    Patient name: Steff Johnson  MRN: [id number]  Admit Date: 5/2/2023   LOS: 1 day     SUBJECTIVE:     Principal problem: Hypertensive emergency    Interval History:  Patient was seen and examined bedside, denies any new symptoms, sitting in the chair enjoying her lunch.  She claims compliance with her medications.  No other acute events overnight as per nursing staff.     Scheduled Meds:   aspirin  81 mg Oral Daily    atorvastatin  40 mg Oral QHS    cloNIDine 0.1 mg/24 hr td ptwk  1 patch Transdermal Q7 Days    enoxaparin  40 mg Subcutaneous Daily    insulin detemir U-100 (Levemir)  40 Units Subcutaneous QHS    ipratropium  0.5 mg Nebulization Q6H    metoprolol succinate  100 mg Oral Daily     Continuous Infusions:  PRN Meds:acetaminophen, dextrose 50%, glucagon (human recombinant), insulin aspart U-100, labetalol, ondansetron, senna-docusate 8.6-50 mg, sodium chloride 0.9%    Review of patient's allergies indicates:  No Known Allergies    Review of Systems: As per interval history    OBJECTIVE:     Vital Signs (Most Recent)  Temp: 99.2 °F (37.3 °C) (05/03/23 1205)  Pulse: 69 (05/03/23 1205)  Resp: 20 (05/03/23 1205)  BP: (!) 149/81 (05/03/23 1205)  SpO2: 96 % (05/03/23 1205)    Vital Signs Range (Last 24H):  Temp:  [97.7 °F (36.5 °C)-99.2 °F (37.3 °C)]   Pulse:  [60-69]   Resp:  [14-20]   BP: (149-215)/(81-99)   SpO2:  [93 %-99 %]     I & O (Last 24H):  Intake/Output Summary (Last 24 hours) at 5/3/2023 1212  Last data filed at 5/3/2023 1029  Gross per 24 hour   Intake 400 ml   Output 300 ml   Net 100 ml       Physical Exam:  General: Patient resting comfortably in no acute distress. Appears as stated age. Calm  Eyes: No conjunctival injection. No scleral icterus.  ENT: Hearing grossly intact. No discharge from ears. No nasal discharge.   Neck: Supple, trachea midline. No JVD  CVS: RRR. No LE edema BL  Lungs:  No tachypnea or accessory muscle use.  Clear to  auscultation bilaterally  Abdomen:  Soft, nontender and nondistended.  No organomegaly  Neuro:  Chronic right-sided hemiparesis noted, AOx3. Moves all extremities. Follows commands. Responds appropriately       Laboratory:  I have reviewed all pertinent lab results within the past 24 hours.    Diagnostic Results:  Reviewed all imaging    ASSESSMENT/PLAN:     63-year-old lady who is in and out of hospital who was just discharged from our service 5 days ago came with multiple neurological symptoms found to have tiny infarct in mid left temporal lobe    Active Hospital Problems    Diagnosis  POA    *Hypertensive emergency [I16.1]  Yes    Acute ischemic stroke  [I63.9]  Yes    CKD (chronic kidney disease), stage III [N18.30]  Yes     Chronic    History of stroke [Z86.73]  Not Applicable     Chronic    Frequent falls [R29.6]  Not Applicable    Type 2 diabetes mellitus with both eyes affected by moderate nonproliferative retinopathy and macular edema, with long-term current use of insulin [E11.3313, Z79.4]  Not Applicable    Type 2 diabetes mellitus with diabetic nephropathy, HbA1c 8.9% [E11.21]  Yes     Chronic    Mixed hyperlipidemia [E78.2]  Yes    Hypertension associated with diabetes [E11.59, I15.2]  Yes      Resolved Hospital Problems   No resolved problems to display.         Plan:   MRI confirmed stroke.  This patient has multiple admissions for hypertensive crisis, stroke  Neurology consulted-noted plan for GT  Continue aspirin, Plavix, statin  Permissive hypertension  PT, OT, SLP  Frequent neuro exams  Basal bolus insulin regimen, Accu-Cheks, hypoglycemia measures  NPO after midnight  Further management as per clinical course    VTE Risk Mitigation (From admission, onward)           Ordered     enoxaparin injection 40 mg  Daily         05/02/23 2031     IP VTE HIGH RISK PATIENT  Once         05/02/23 2031     Place sequential compression device  Until discontinued         05/02/23 2031                         Department Hospital Medicine  [facility]  Vilma Rodriguez MD  Date of service: 05/03/2023

## 2023-05-03 NOTE — H&P (VIEW-ONLY)
Formerly [facility], [facility]  Department of Cardiology  Consult Note      PATIENT NAME: Steff Johnson    MRN: [id number]  TODAY'S DATE: 05/03/2023  ADMIT DATE: 5/2/2023                          CONSULT REQUESTED BY: Vilma Rodriguez MD    SUBJECTIVE     PRINCIPAL PROBLEM: Hypertensive emergency      REASON FOR CONSULT: GT requested    From H&P: Ms. Johnson is a 63-year-old right-handed female who was brought to the ED due to neurological symptoms.  Patient is a poor historian, only verbalized few words during my encounter, collateral from family present at bedside as well as ED provider.  Patient with recent [facility] admission, 4/26 to 4/28, presented with vision changes with slurred speech, treated for hypertensive emergency, MRI with acute right medial midbrain infarct, seen by Neurology, dual antiplatelet therapy, PT/OT recommended home health.  As per history, noted to have left facial droop with right upper extremity weakness, slurred speech with word-finding difficulties, reported initial episode started yesterday but somewhat improved, reoccurred around lunchtime (12:30 pm) today which prompted ED presentation.  Patient denies headache, swallowing difficulties, does reports vision abnormalities but unclear if new or chronic, does report weakness right upper extremity, she lives with her father at home who assist her, reports did take all medications this morning. Family is concerned about high salt diet. Patient is a non smoker.  BP on presentation 201/96.  CBC not available, discussed with lab, needs to be recollected, communicated with nursing.  Sodium 136, BUN/creatinine 27/1.2, INR 1, LDL 65, troponin 2.6, glucose 131.  Chest x-ray poor expansion with no acute abnormalities, CT head no acute hemorrhage, CTA no large vessel occlusion.  She was evaluated by tele stroke, Dr Goodman, out of window for tPA, MRI brain without contrast, continuing dual antiplatelet therapy, allowing for permissive hypertension up to  210/120.  Case discussed with ED provider who requested admission.  Plan of care was discussed with family members present at bedside.         HPI:    Patient is a 63 year old female who presented to the Er with complaints of visual changes. Patient noted to have significantly elevated Bp on arrival and also was noted to have acute ischemic infarct. Cardiology was consulted for GT. She was seen sitting up in chair and able to discuss procedure with us.       Review of patient's allergies indicates:  No Known Allergies    Past Medical History:   Diagnosis Date    Arthritis     Complete tear of left rotator cuff 11/09/2017    COPD (chronic obstructive pulmonary disease)     COVID-19 infection 07/02/2021    Diabetes mellitus     H/o Cerebrovascular accident (CVA) of left pontine structure 12/12/2019    Hypertension     Personal history of colonic polyps     Stroke     Wears glasses      Past Surgical History:   Procedure Laterality Date    COLONOSCOPY N/A 4/11/2017    Procedure: COLONOSCOPY;  Surgeon: Jared Antonio MD;  Location: [facility];  Service: Endoscopy;  Laterality: N/A;    HYSTERECTOMY      OOPHORECTOMY      SHOULDER SURGERY      R     Social History     Tobacco Use    Smoking status: Never    Smokeless tobacco: Never   Substance Use Topics    Alcohol use: No    Drug use: No        REVIEW OF SYSTEMS  CONSTITUTIONAL: Negative for chills, fatigue and fever.   NEURO: No headaches, No dizziness; + right hand weakness   RESPIRATORY: Negative for cough, shortness of breath and wheezing.    CARDIOVASCULAR: Negative for chest pain. Negative for palpitations and leg swelling.   GI: Negative for abdominal pain, No melena, diarrhea, nausea and vomiting.   : Negative for dysuria and frequency, Negative for hematuria  SKIN: Negative for bruising, Negative for edema or discoloration noted.   PSYCHIATRIC: Negative for depression, Negative for anxiety, Negative for memory loss  MUSCULOSKELETAL: Negative for neck pain,  Negative for muscle weakness, Negative for back pain     OBJECTIVE     VITAL SIGNS (Most Recent)  Temp: 97.8 °F (36.6 °C) (05/03/23 0809)  Pulse: 61 (05/03/23 0809)  Resp: 17 (05/03/23 0809)  BP: (!) 193/88 (nurse jewel notified) (05/03/23 0809)  SpO2: 95 % (05/03/23 0809)    VENTILATION STATUS  Resp: 17 (05/03/23 0809)  SpO2: 95 % (05/03/23 0809)           I & O (Last 24H):  Intake/Output Summary (Last 24 hours) at 5/3/2023 1121  Last data filed at 5/3/2023 1029  Gross per 24 hour   Intake 400 ml   Output 300 ml   Net 100 ml       WEIGHTS  Wt Readings from Last 1 Encounters:   05/02/23 2230 61.1 kg (134 lb 11.2 oz)   05/02/23 1700 59 kg (130 lb)       PHYSICAL EXAM  CONSTITUTIONAL: No fever, no chills  HEENT: Normocephalic, atraumatic,pupils reactive to light                 NECK:  No JVD no carotid bruit  CVS: S1S2+, RRR  LUNGS: Clear  ABDOMEN: Soft, NT, BS+  EXTREMITIES: No cyanosis, edema  : No del rosario catheter  NEURO: AAO X 3  PSY: Normal affect      HOME MEDICATIONS:  No current facility-administered medications on file prior to encounter.     Current Outpatient Medications on File Prior to Encounter   Medication Sig Dispense Refill    amLODIPine (NORVASC) 10 MG tablet Take 1 tablet (10 mg total) by mouth once daily. 30 tablet 0    aspirin 81 MG Chew Take 1 tablet (81 mg total) by mouth once daily. 30 tablet 0    atorvastatin (LIPITOR) 40 MG tablet Take 1 tablet (40 mg total) by mouth once daily. 90 tablet 1    cloNIDine 0.1 mg/24 hr td ptwk (CATAPRES) 0.1 mg/24 hr Place 1 patch onto the skin every 7 days. 12 patch 1    clopidogreL (PLAVIX) 75 mg tablet Take 1 tablet (75 mg total) by mouth once daily. 90 tablet 1    dexAMETHasone (OZURDEX) 0.7 mg Impl intravitreal implant 0.7 mg by Intravitreal route once.      hydrALAZINE (APRESOLINE) 100 MG tablet Take 1 tablet (100 mg total) by mouth 3 (three) times daily. 270 tablet 1    insulin aspart U-100 (NOVOLOG) 100 unit/mL injection Inject into the skin 3 (three)  "times daily before meals.      insulin degludec (TRESIBA FLEXTOUCH U-100) 100 unit/mL (3 mL) insulin pen ADMINISTER 40 UNITS UNDER THE SKIN EVERY EVENING (Patient taking differently: Inject 40 Units into the skin every evening. ADMINISTER 40 UNITS UNDER THE SKIN EVERY EVENING) 12 pen 1    losartan (COZAAR) 50 MG tablet Take 1 tablet (50 mg total) by mouth once daily. 90 tablet 1    metoprolol succinate (TOPROL-XL) 100 MG 24 hr tablet Take 1 tablet (100 mg total) by mouth once daily. 90 tablet 1    tiotropium (SPIRIVA) 18 mcg inhalation capsule Inhale 1 capsule (18 mcg total) into the lungs once daily. Controller 90 capsule 3    pen needle, diabetic (BD ULTRA-FINE SHORT PEN NEEDLE) 31 gauge x 5/16" Ndle 1 pen by Misc.(Non-Drug; Combo Route) route 4 (four) times daily. 300 each 5    [DISCONTINUED] blood-glucose meter (TRUE METRIX GLUCOSE METER) kit Use as instructed 1 each 0    [DISCONTINUED] chlorthalidone (HYGROTEN) 50 MG Tab Take 1 tablet (50 mg total) by mouth once daily. 90 tablet 3    [DISCONTINUED] lancing device (TRUEDRAW LANCING DEVICE) Misc Inject 1 Device into the skin 2 (two) times daily. 1 each 3       SCHEDULED MEDS:   aspirin  81 mg Oral Daily    atorvastatin  40 mg Oral QHS    cloNIDine 0.1 mg/24 hr td ptwk  1 patch Transdermal Q7 Days    enoxaparin  40 mg Subcutaneous Daily    insulin detemir U-100 (Levemir)  40 Units Subcutaneous QHS    ipratropium  0.5 mg Nebulization Q6H    metoprolol succinate  100 mg Oral Daily       CONTINUOUS INFUSIONS:    PRN MEDS:acetaminophen, dextrose 50%, glucagon (human recombinant), insulin aspart U-100, labetalol, ondansetron, senna-docusate 8.6-50 mg, sodium chloride 0.9%    LABS AND DIAGNOSTICS     CBC LAST 3 DAYS  Recent Labs   Lab 04/26/23  2149 04/28/23  0324 05/02/23  2254 05/03/23  0343   WBC 9.16 9.98 10.47 10.38   RBC 4.42 4.33 4.54 4.50   HGB 12.4 12.2 12.6 12.6   HCT 35.1* 34.4* 35.9* 35.9*   MCV 79* 79* 79* 80*   MCH 28.1 28.2 27.8 28.0   MCHC 35.3 35.5 35.1 " 35.1   RDW 14.8* 14.8* 15.2* 15.2*    412 433 452*   MPV 11.1 10.4 10.3 10.8   GRAN 51.4  4.7  --  62.5  6.5  --    LYMPH 34.8  3.2  --  25.6  2.7  --    MONO 10.6  1.0  --  9.8  1.0  --    BASO 0.06  --  0.05  --    NRBC 0  --  0  --        COAGULATION LAST 3 DAYS  Recent Labs   Lab 05/02/23 1750   INR 1.0       CHEMISTRY LAST 3 DAYS  Recent Labs   Lab 04/26/23 2149 04/27/23  1628 04/28/23  0324 05/02/23  1750 05/03/23  0343      < > 136 136 139   K 4.0   < > 3.8 4.6 4.0      < > 106 108 111*   CO2 26   < > 21* 20* 21*   ANIONGAP 8   < > 9 8 7*   BUN 19   < > 31* 27* 23   CREATININE 1.1   < > 1.4 1.2 1.0   *   < > 289* 121* 91   CALCIUM 9.1   < > 9.0 9.2 9.1   MG 1.8  --   --   --  2.0   ALBUMIN 3.6  --   --  3.7  --    PROT 7.3  --   --  7.6  --    ALKPHOS 81  --   --  71  --    ALT 19  --   --  24  --    AST 19  --   --  24  --    BILITOT 0.5  --   --  0.7  --     < > = values in this interval not displayed.       CARDIAC PROFILE LAST 3 DAYS  Recent Labs   Lab 04/26/23 2149 04/27/23  0117 05/02/23 1750   BNP 25  --   --    TROPONINIHS 4.0 4.4 2.6       ENDOCRINE LAST 3 DAYS  Recent Labs   Lab 05/02/23  1750   TSH 4.180       LAST ARTERIAL BLOOD GAS  ABG  No results for input(s): PH, PO2, PCO2, HCO3, BE in the last 168 hours.    LAST 7 DAYS MICROBIOLOGY   Microbiology Results (last 7 days)       ** No results found for the last 168 hours. **            MOST RECENT IMAGING  MRI Brain Without Contrast  MR BRAIN WITHOUT IV CONTRAST    Comparisons: Brain MRI with and without contrast dated April 27, 2023    Additional pertinent history: Stroke    TECHNIQUE:  Multiplanar, multisequence brain MRI was performed without gadolinium contrast.    FINDINGS:    Sagittal midline structures and craniocervical junction: Negative.    Midline shift: None.    Ventricles: Negative.    Brain parenchyma:  Diffusion weighted imaging: Tiny region of restricted diffusion involving the posterior aspects  of the right midbrain as well as a linear region of restricted diffusion within the mid left temporal lobe.  Gradient sequence: Continued findings of multiple foci of magnetic susceptibility within the brain parenchyma in a distribution similar to the previous exam with differential considerations including chronic small microvascular hemorrhages related to hypertensive episodes versus amyloid angiopathy.  T2 weighted FLAIR images: Patchy hypoattenuation within the periventricular and subcortical white matter, nonspecific but likely representing small vessel ischemic change on a chronic basis.    Extra-axial spaces: Mild cerebral atrophy.    Dural venous sinuses and major arterial flow voids: Negative.    Mastoid air cells and paranasal sinuses: Negative.    Surrounding soft tissues and orbits: Negative.    Impression:  1. Superimposed upon chronic age-related changes and multiple regions of either chronic microhemorrhage or amyloid angiopathy are findings of 2 small regions of acute infarct within the right posterior midbrain in the mid left temporal lobe.  2. Other age related changes as described above.    Electronically signed by:  Elieser Preciado MD  5/2/2023 10:59 PM CDT Workstation: [id number]  X-Ray Chest AP Portable  EXAM: XR CHEST AP PORTABLE    HISTORY: Stroke. Possible aspiration.    COMPARISON:Chest x-ray dated 4/26/2023    FINDINGS: The lungs are somewhat poorly inflated. There is mild linear atelectasis in the left midlung zone. No acute focal infiltrates are identified. There are no pleural effusions. The cardiomediastinal silhouette is unremarkable.    IMPRESSION:   Poor lung expansion. No evidence of acute disease within the chest.    Electronically signed by:  Haresh Frausto MD  5/2/2023 7:05 PM CDT Workstation: [id number]  CTA Head and Neck (xpd)  EXAM: CTA HEAD AND NECK (XPD)    HISTORY: Stroke/TIA, determine embolic source    COMPARISON: CTA of the head and neck dated 4/27/2023. MRI of the brain  dated 4/27/2023.    TECHNIQUE: Multiple axial 1 mm thick images were obtained through the neck and head during rapid IV injection of contrast. Coronal and sagittal reconstructions were produced.    This exam was performed according to our departmental dose-optimization program, which includes automated exposure control, adjustment of the mA and/or kV according to patient size and/or use of iterative reconstruction technique.    Unless otherwise stated, incidental findings do not require dedicated follow-up imaging.    FINDINGS: There is normal branching of the great vessels from the aortic arch that all appear widely patent. No stenoses are evident within either common or external or internal carotid artery within the neck. The carotid bifurcations are normal. The vertebral arteries are codominant and are both widely patent throughout their course neck and head. No atherosclerotic disease is evident within the intracranial arterial vasculature. There are no significant stenoses or focal intracranial occlusions. No intentional embolic source is demonstrated. Note that review of the patient's MRI shows evidence of extensive small vessel chronic ischemic change and also extensive amyloid angiopathy which likely accounts for the patient's infarcts. The overall appearance is not typical of embolic infarcts. That study showed a punctate acute ischemic infarct in the superior aspect of the midbrain to the right of midline that is quite likely not be embolic in etiology. There are no aneurysms. Normal enhancement is observed in the dural venous sinuses. There is no abnormal enhancement within the brain parenchyma or brainstem.    IMPRESSION:   Unremarkable CT angiography imaging of the neck and head. There is essentially no evidence of atherosclerotic disease. There are no significant stenoses or focal occlusions within the arterial vasculature of the neck or head. See above discussion.    Electronically signed by:  Haresh  Lisbet PALOMO  5/2/2023 6:11 PM CDT Workstation: [id number]  CT HEAD FOR STROKE  EXAM: CT HEAD FOR STROKE    HISTORY: Neuro deficit, acute, stroke suspected    COMPARISON: CT scan of the head dated 4/26/2023    TECHNIQUE: Multiple axial 3.0 mm thick images were acquired from the base of the skull to the vertex without IV contrast.    This exam was performed according to our departmental dose-optimization program, which includes automated exposure control, adjustment of the mA and/or kV according to patient size and/or use of iterative reconstruction technique.    Unless otherwise stated, incidental findings do not require dedicated follow-up imaging.    FINDINGS: There is patchy ill-defined abnormal diminished attenuation throughout the white matter of both cerebral hemispheres that appears similar on the previous CT scan and is consistent with sequela of extensive small vessel chronic ischemic change. No definite acute infarcts are identified. There is no acute intracranial hemorrhage. There is a small chronic infarct in the medial aspect of the left frontal lobe in the vascular territory of the left anterior cerebral artery that is unchanged. There is also small chronic infarct in the posterior aspect of the regina that is unchanged. This was better shown on the MRI study dated 4/27/2023 which also showed a punctate acute lacunar infarct in the far superior aspect of the midbrain on the right. There are no discrete intracranial masses. The paranasal sinuses and mastoid air cells and middle ear cavities are well aerated.    IMPRESSION:   Evidence of extensive small vessel chronic ischemic change as described. Small chronic lacunar infarct in the posterior aspect of the regina. No acute intracranial abnormality is identified.    Electronically signed by:  Haresh Frausto MD  5/2/2023 5:58 PM CDT Workstation: [id number]      ECHOCARDIOGRAM RESULTS (last 5)  Results for orders placed during the hospital encounter of  04/26/23    Echo Saline Bubble? No    Interpretation Summary  · The left ventricle is normal in size with normal systolic function.  · Indeterminate left ventricular diastolic function.  · The estimated ejection fraction is 62%.  · Normal right ventricular size with normal right ventricular systolic function.  · Normal central venous pressure (3 mmHg).      Results for orders placed during the hospital encounter of 05/18/22    Echo Saline Bubble? No    Interpretation Summary  · There is no evidence of intracardiac shunting.  · The left ventricle is normal in size with normal systolic function.  · The estimated ejection fraction is 65%.  · Indeterminate left ventricular diastolic function.  · Normal right ventricular size with normal right ventricular systolic function.  · No interatrial septal defect present.  · Mild aortic regurgitation.      Results for orders placed during the hospital encounter of 07/02/21    Echo    Interpretation Summary  · Normal central venous pressure (3 mmHg).  · The left ventricle is normal in size with mild concentric hypertrophy and normal systolic function.  · The estimated ejection fraction is 53%.  · Grade I left ventricular diastolic dysfunction.  · Normal right ventricular size with normal right ventricular systolic function.      Results for orders placed during the hospital encounter of 12/12/19    Echo Color Flow Doppler? Yes; Bubble Contrast? Yes    Interpretation Summary  · Concentric left ventricular remodeling.  · Normal left ventricular systolic function. The estimated ejection fraction is 54%  · Grade I (mild) left ventricular diastolic dysfunction consistent with impaired relaxation.  · Normal right ventricular systolic function.  · No interatrial septal defect present.  · Normal central venous pressure (3 mm Hg).  · No PFO on color flow and saline bubble study      CURRENT/PREVIOUS VISIT EKG  Results for orders placed or performed during the hospital encounter of  04/26/23   EKG 12-lead    Collection Time: 04/26/23  9:30 PM    Narrative    Test Reason : R07.9,    Vent. Rate : 071 BPM     Atrial Rate : 071 BPM     P-R Int : 136 ms          QRS Dur : 076 ms      QT Int : 434 ms       P-R-T Axes : 065 038 115 degrees     QTc Int : 471 ms    Normal sinus rhythm  Nonspecific T wave abnormality  Abnormal ECG  When compared with ECG of 18-MAY-2022 16:58,  No significant change was found    Referred By: [email or web address]   SELF           Confirmed By:            ASSESSMENT/PLAN:     Active Hospital Problems    Diagnosis    *Hypertensive emergency    Acute ischemic stroke     CKD (chronic kidney disease), stage III    History of stroke    Frequent falls    Type 2 diabetes mellitus with both eyes affected by moderate nonproliferative retinopathy and macular edema, with long-term current use of insulin    Type 2 diabetes mellitus with diabetic nephropathy, HbA1c 8.9%    Mixed hyperlipidemia    Hypertension associated with diabetes       ASSESSMENT & PLAN:     Acute ischemic CVA  HTN emergency  CKD stage III  Type II DM  Falls  Hx of CVA      RECOMMENDATIONS:    Risks of GT were discussed with patient the risks include but not limited to oropharyngeal trauma, dental trauma, trauma to the esophagus, possible aspiration, and reaction to anesthesia. She is agreeable to proceed.  Keep NPO after midnight.   Bp improving but was still very high overnight. Managed by primary team.       Tonia Tobar NP  Date of Service: 05/03/2023  11:21 AM     I have personally interviewed and examined the patient, I have reviewed the Nurse Practitioner's history and physical, assessment, and plan. I have personally evaluated the patient at bedside and agree with the findings and made appropriate changes as necessary in recommendations.    PATIENT WITH RECURRENT STROKES POSSIBLY EMBOLIC.  DISCUSSED GT PROCEDURE AND RISKS INCLUDING DISRUPTION OF TEETH ESOPHAGEAL TEAR BLEEDING OR PERFORATION.  SHE AGREES TO  PROCEED    Blade Phillip MD  Department of Cardiology  Formerly [facility], later [facility]  05/03/2023 11:22 AM

## 2023-05-03 NOTE — PLAN OF CARE
Problem: SLP  Goal: SLP Goal  Description: 1. Pt will complete oral motor exercises and speech drills to improve articulation and velar movement for adequate nasality during speech production   2. Pt will complete abstract naming tasks w/ 70% accuracy given mod cues  3. Pt will correctly answer orientation questions w/ 80% accuracy given min cues  4. Pt will complete memory tasks w/ 70% accuracy given mod  cues  Outcome: Ongoing, Progressing     Pt evaluated for stroke workup. Pt presents w/ mild dysarthria and severe cognitive-linguistic deficits. No swallowing deficits noted. Rec regular diet, thin liquids. Rec Skilled ST services during admit and at next level of care.

## 2023-05-03 NOTE — PHARMACY MED REC
"              .      Admission Medication History     The home medication history was taken by Eboni Vera.    You may go to "Admission" then "Reconcile Home Medications" tabs to review and/or act upon these items.     The home medication list has been updated by the Pharmacy department.   Please read ALL comments highlighted in yellow.   Please address this information as you see fit.    Feel free to contact us if you have any questions or require assistance.        Medications listed below were obtained from: Patient/family  Prior to Admission medications    Medication Sig Start Date End Date Taking? Authorizing Provider   amLODIPine (NORVASC) 10 MG tablet Take 1 tablet (10 mg total) by mouth once daily. 4/29/23 5/29/23 Yes Juan Alberto Iqbal MD   aspirin 81 MG Chew Take 1 tablet (81 mg total) by mouth once daily. 4/28/23 5/28/23 Yes Juan Alberto Iqbal MD   atorvastatin (LIPITOR) 40 MG tablet Take 1 tablet (40 mg total) by mouth once daily. 2/16/23 2/16/24 Yes Cristina Ren MD   cloNIDine 0.1 mg/24 hr td ptwk (CATAPRES) 0.1 mg/24 hr Place 1 patch onto the skin every 7 days. 2/16/23 2/16/24 Yes Cristina Ren MD   clopidogreL (PLAVIX) 75 mg tablet Take 1 tablet (75 mg total) by mouth once daily. 2/16/23 6/16/23 Yes Cristina Ren MD   hydrALAZINE (APRESOLINE) 100 MG tablet Take 1 tablet (100 mg total) by mouth 3 (three) times daily. 2/16/23 2/16/24 Yes Cristina Ren MD   insulin aspart U-100 (NOVOLOG) 100 unit/mL injection Inject into the skin 3 (three) times daily before meals.   Yes Historical Provider   insulin degludec (TRESIBA FLEXTOUCH U-100) 100 unit/mL (3 mL) insulin pen ADMINISTER 40 UNITS UNDER THE SKIN EVERY EVENING  Patient taking differently: Inject 40 Units into the skin every evening. ADMINISTER 40 UNITS UNDER THE SKIN EVERY EVENING 2/16/23  Yes Cristina Ren MD   losartan (COZAAR) 50 MG tablet Take 1 tablet (50 mg total) by mouth once daily. 2/16/23  Yes Cristina Ren MD " "  metoprolol succinate (TOPROL-XL) 100 MG 24 hr tablet Take 1 tablet (100 mg total) by mouth once daily. 2/16/23 5/17/23 Yes Cristina Ren MD   tiotropium (SPIRIVA) 18 mcg inhalation capsule Inhale 1 capsule (18 mcg total) into the lungs once daily. Controller 2/16/23 2/16/24 Yes Cristina Ren MD   pen needle, diabetic (BD ULTRA-FINE SHORT PEN NEEDLE) 31 gauge x 5/16" Ndle 1 pen by Misc.(Non-Drug; Combo Route) route 4 (four) times daily. 2/16/23   Cristina Ren MD   blood-glucose meter (TRUE METRIX GLUCOSE METER) kit Use as instructed 10/20/21 5/18/22  Gabriel Moran, SHWETA           chlorthalidone (HYGROTEN) 50 MG Tab Take 1 tablet (50 mg total) by mouth once daily. 2/1/22 5/20/22  Cristina Ren MD   lancing device (TRUEDRAW LANCING DEVICE) Misc Inject 1 Device into the skin 2 (two) times daily. 3/31/22 5/18/22  Gabriel Moran NP   OZURDEX 0.7 mg Impl intravitreal implant 0.7 mg by Intravitreal route once. 3/8/23 5/2/23  Historical Provider           Eboni Vera  EXT 1926      "

## 2023-05-04 ENCOUNTER — ANESTHESIA (OUTPATIENT)
Dept: CARDIOLOGY | Facility: HOSPITAL | Age: 63
DRG: 065 | End: 2023-05-04
Payer: MEDICARE

## 2023-05-04 ENCOUNTER — CLINICAL SUPPORT (OUTPATIENT)
Dept: CARDIOLOGY | Facility: HOSPITAL | Age: 63
DRG: 065 | End: 2023-05-04
Attending: HOSPITALIST
Payer: MEDICARE

## 2023-05-04 LAB
BSA FOR ECHO PROCEDURE: 1.59 M2
EJECTION FRACTION: 60 %
GLUCOSE SERPL-MCNC: 168 MG/DL (ref 70–110)
GLUCOSE SERPL-MCNC: 298 MG/DL (ref 70–110)
GLUCOSE SERPL-MCNC: 63 MG/DL (ref 70–110)
GLUCOSE SERPL-MCNC: 73 MG/DL (ref 70–110)

## 2023-05-04 PROCEDURE — 63600175 PHARM REV CODE 636 W HCPCS: Performed by: INTERNAL MEDICINE

## 2023-05-04 PROCEDURE — 37000009 HC ANESTHESIA EA ADD 15 MINS: Performed by: GENERAL PRACTICE

## 2023-05-04 PROCEDURE — 99233 SBSQ HOSP IP/OBS HIGH 50: CPT | Mod: ,,, | Performed by: GENERAL PRACTICE

## 2023-05-04 PROCEDURE — D9220A PRA ANESTHESIA: ICD-10-PCS | Mod: ANES,,, | Performed by: ANESTHESIOLOGY

## 2023-05-04 PROCEDURE — 25000003 PHARM REV CODE 250: Performed by: INTERNAL MEDICINE

## 2023-05-04 PROCEDURE — 93005 ELECTROCARDIOGRAM TRACING: CPT | Performed by: INTERNAL MEDICINE

## 2023-05-04 PROCEDURE — 63600175 PHARM REV CODE 636 W HCPCS: Performed by: NURSE ANESTHETIST, CERTIFIED REGISTERED

## 2023-05-04 PROCEDURE — 93325 DOPPLER ECHO COLOR FLOW MAPG: CPT

## 2023-05-04 PROCEDURE — 93325 TRANSESOPHAGEAL ECHO (TEE) W/ POSSIBLE CARDIOVERSION: ICD-10-PCS | Mod: 26,,, | Performed by: GENERAL PRACTICE

## 2023-05-04 PROCEDURE — D9220A PRA ANESTHESIA: Mod: ANES,,, | Performed by: ANESTHESIOLOGY

## 2023-05-04 PROCEDURE — D9220A PRA ANESTHESIA: Mod: CRNA,,, | Performed by: NURSE ANESTHETIST, CERTIFIED REGISTERED

## 2023-05-04 PROCEDURE — 93321 DOPPLER ECHO F-UP/LMTD STD: CPT | Mod: 26,,, | Performed by: GENERAL PRACTICE

## 2023-05-04 PROCEDURE — 99900035 HC TECH TIME PER 15 MIN (STAT)

## 2023-05-04 PROCEDURE — 99233 PR SUBSEQUENT HOSPITAL CARE,LEVL III: ICD-10-PCS | Mod: ,,, | Performed by: GENERAL PRACTICE

## 2023-05-04 PROCEDURE — 94640 AIRWAY INHALATION TREATMENT: CPT

## 2023-05-04 PROCEDURE — 99900031 HC PATIENT EDUCATION (STAT)

## 2023-05-04 PROCEDURE — 82962 GLUCOSE BLOOD TEST: CPT | Performed by: GENERAL PRACTICE

## 2023-05-04 PROCEDURE — 93312 TRANSESOPHAGEAL ECHO (TEE) W/ POSSIBLE CARDIOVERSION: ICD-10-PCS | Mod: 26,,, | Performed by: GENERAL PRACTICE

## 2023-05-04 PROCEDURE — 21400001 HC TELEMETRY ROOM

## 2023-05-04 PROCEDURE — 37000008 HC ANESTHESIA 1ST 15 MINUTES: Performed by: GENERAL PRACTICE

## 2023-05-04 PROCEDURE — 25000242 PHARM REV CODE 250 ALT 637 W/ HCPCS: Performed by: INTERNAL MEDICINE

## 2023-05-04 PROCEDURE — 93325 DOPPLER ECHO COLOR FLOW MAPG: CPT | Mod: 26,,, | Performed by: GENERAL PRACTICE

## 2023-05-04 PROCEDURE — 25000003 PHARM REV CODE 250: Performed by: HOSPITALIST

## 2023-05-04 PROCEDURE — 93312 ECHO TRANSESOPHAGEAL: CPT | Mod: 26,,, | Performed by: GENERAL PRACTICE

## 2023-05-04 PROCEDURE — D9220A PRA ANESTHESIA: ICD-10-PCS | Mod: CRNA,,, | Performed by: NURSE ANESTHETIST, CERTIFIED REGISTERED

## 2023-05-04 PROCEDURE — 93010 ELECTROCARDIOGRAM REPORT: CPT | Mod: ,,, | Performed by: INTERNAL MEDICINE

## 2023-05-04 PROCEDURE — 93321 TRANSESOPHAGEAL ECHO (TEE) W/ POSSIBLE CARDIOVERSION: ICD-10-PCS | Mod: 26,,, | Performed by: GENERAL PRACTICE

## 2023-05-04 PROCEDURE — 94761 N-INVAS EAR/PLS OXIMETRY MLT: CPT

## 2023-05-04 PROCEDURE — 93010 EKG 12-LEAD: ICD-10-PCS | Mod: ,,, | Performed by: INTERNAL MEDICINE

## 2023-05-04 RX ORDER — HYDRALAZINE HYDROCHLORIDE 25 MG/1
50 TABLET, FILM COATED ORAL EVERY 8 HOURS
Status: DISCONTINUED | OUTPATIENT
Start: 2023-05-04 | End: 2023-05-05 | Stop reason: HOSPADM

## 2023-05-04 RX ORDER — LOSARTAN POTASSIUM 50 MG/1
50 TABLET ORAL DAILY
Status: DISCONTINUED | OUTPATIENT
Start: 2023-05-04 | End: 2023-05-05 | Stop reason: HOSPADM

## 2023-05-04 RX ORDER — PROPOFOL 10 MG/ML
VIAL (ML) INTRAVENOUS
Status: DISCONTINUED | OUTPATIENT
Start: 2023-05-04 | End: 2023-05-04

## 2023-05-04 RX ADMIN — HYDRALAZINE HYDROCHLORIDE 50 MG: 25 TABLET, FILM COATED ORAL at 02:05

## 2023-05-04 RX ADMIN — PROPOFOL 50 MG: 10 INJECTION, EMULSION INTRAVENOUS at 12:05

## 2023-05-04 RX ADMIN — ENOXAPARIN SODIUM 40 MG: 100 INJECTION SUBCUTANEOUS at 05:05

## 2023-05-04 RX ADMIN — INSULIN ASPART 3 UNITS: 100 INJECTION, SOLUTION INTRAVENOUS; SUBCUTANEOUS at 09:05

## 2023-05-04 RX ADMIN — IPRATROPIUM BROMIDE 0.5 MG: 0.5 SOLUTION RESPIRATORY (INHALATION) at 01:05

## 2023-05-04 RX ADMIN — ATORVASTATIN CALCIUM 40 MG: 40 TABLET, FILM COATED ORAL at 09:05

## 2023-05-04 RX ADMIN — IPRATROPIUM BROMIDE 0.5 MG: 0.5 SOLUTION RESPIRATORY (INHALATION) at 07:05

## 2023-05-04 RX ADMIN — LOSARTAN POTASSIUM 50 MG: 50 TABLET, FILM COATED ORAL at 02:05

## 2023-05-04 RX ADMIN — INSULIN ASPART 2 UNITS: 100 INJECTION, SOLUTION INTRAVENOUS; SUBCUTANEOUS at 05:05

## 2023-05-04 RX ADMIN — HYDRALAZINE HYDROCHLORIDE 50 MG: 25 TABLET, FILM COATED ORAL at 09:05

## 2023-05-04 RX ADMIN — INSULIN DETEMIR 40 UNITS: 100 INJECTION, SOLUTION SUBCUTANEOUS at 09:05

## 2023-05-04 NOTE — PT/OT/SLP PROGRESS
Physical Therapy      Patient Name:  Steff Johnson   MRN:  [id number]    Patient not seen today secondary to Off the floor for procedure/surgery, Other (Comment) (pt off floor for GT x2 attempts (am and pm)). Will follow-up 5/5/23.

## 2023-05-04 NOTE — PT/OT/SLP PROGRESS
Occupational Therapy      Patient Name:  Steff Johnson   MRN:  [id number]    Patient not seen today secondary to Off the floor for procedure (GT).      5/4/2023

## 2023-05-04 NOTE — PROGRESS NOTES
[facility]  Progress Note    Patient name: Steff Johnson  MRN: [id number]  Admit Date: 5/2/2023   LOS: 2 days     SUBJECTIVE:     Principal problem: Hypertensive emergency    Interval History:  Patient was seen and examined bedside in cath lab recovery, she just underwent GT which did not show any intracardiac shunting.  Very labile blood pressure as well as blood sugars.   No other acute events overnight as per nursing staff.     Scheduled Meds:   aspirin  81 mg Oral Daily    atorvastatin  40 mg Oral QHS    cloNIDine 0.1 mg/24 hr td ptwk  1 patch Transdermal Q7 Days    enoxaparin  40 mg Subcutaneous Daily    insulin detemir U-100 (Levemir)  40 Units Subcutaneous QHS    ipratropium  0.5 mg Nebulization Q6H    metoprolol succinate  100 mg Oral Daily     Continuous Infusions:  PRN Meds:acetaminophen, dextrose 50%, glucagon (human recombinant), insulin aspart U-100, labetalol, ondansetron, senna-docusate 8.6-50 mg, sodium chloride 0.9%    Review of patient's allergies indicates:  No Known Allergies    Review of Systems: As per interval history    OBJECTIVE:     Vital Signs (Most Recent)  Temp: 98.6 °F (37 °C) (notified nurse Rita of high b/p) (05/04/23 0749)  Pulse: 62 (05/04/23 0749)  Resp: 18 (05/04/23 0749)  BP: (!) 178/84 (05/04/23 0749)  SpO2: 99 % (05/04/23 0749)    Vital Signs Range (Last 24H):  Temp:  [97.8 °F (36.6 °C)-98.6 °F (37 °C)]   Pulse:  [52-75]   Resp:  [16-20]   BP: (159-196)/(82-98)   SpO2:  [96 %-99 %]     I & O (Last 24H):  Intake/Output Summary (Last 24 hours) at 5/4/2023 1249  Last data filed at 5/3/2023 1549  Gross per 24 hour   Intake 240 ml   Output 350 ml   Net -110 ml         Physical Exam:  General: Patient resting comfortably in no acute distress. Appears as stated age. Calm  Eyes: No conjunctival injection. No scleral icterus.  ENT: Hearing grossly intact. No discharge from ears. No nasal discharge.   Neck: Supple, trachea midline. No JVD  CVS: RRR. No  LE edema BL  Lungs:  No tachypnea or accessory muscle use.  Clear to auscultation bilaterally  Abdomen:  Soft, nontender and nondistended.  No organomegaly  Neuro:  Chronic right-sided hemiparesis noted, AOx3. Moves all extremities. Follows commands. Responds appropriately       Laboratory:  I have reviewed all pertinent lab results within the past 24 hours.    Diagnostic Results:  Reviewed all imaging    ASSESSMENT/PLAN:     63-year-old lady who is in and out of hospital who was just discharged from our service 5 days ago came with multiple neurological symptoms found to have tiny infarct in mid left temporal lobe    Active Hospital Problems    Diagnosis  POA    *Hypertensive emergency [I16.1]  Yes    Acute ischemic stroke  [I63.9]  Yes    CKD (chronic kidney disease), stage III [N18.30]  Yes     Chronic    History of stroke [Z86.73]  Not Applicable     Chronic    Frequent falls [R29.6]  Not Applicable    Type 2 diabetes mellitus with both eyes affected by moderate nonproliferative retinopathy and macular edema, with long-term current use of insulin [E11.3313, Z79.4]  Not Applicable    Type 2 diabetes mellitus with diabetic nephropathy, HbA1c 8.9% [E11.21]  Yes     Chronic    Mixed hyperlipidemia [E78.2]  Yes    Hypertension associated with diabetes [E11.59, I15.2]  Yes      Resolved Hospital Problems   No resolved problems to display.         Plan:   MRI confirmed stroke. This patient has multiple admissions for hypertensive crisis, stroke  Neurology consulted-she just underwent GT which did not show any intracardiac shunting  Continue aspirin, Plavix, statin  Resumed home antihypertensive regimen.  Blood pressure still remains very elevated  Borderline hyperglycemia noted  PT, OT, SLP  Frequent neuro exams  Basal bolus insulin regimen, Accu-Cheks, hypoglycemia measures  Further management as per clinical course  Will observe her overnight for better blood pressure and glycemic control    VTE Risk Mitigation  (From admission, onward)           Ordered     enoxaparin injection 40 mg  Daily         05/02/23 2031     IP VTE HIGH RISK PATIENT  Once         05/02/23 2031     Place sequential compression device  Until discontinued         05/02/23 2031                        Department Hospital Medicine  [facility]  Vilma Rodriguez MD  Date of service: 05/04/2023

## 2023-05-04 NOTE — PT/OT/SLP PROGRESS
Speech Language Pathology      Steff NIALL Johnson  MRN: [id number]    Patient not seen today secondary to Off the floor for procedure/surgery (GT). Will follow-up 5/5.

## 2023-05-04 NOTE — PROGRESS NOTES
[facility]  Department of Cardiology  Progress Note      PATIENT NAME: Steff Johnson    MRN: [id number]  TODAY'S DATE: 05/04/2023  ADMIT DATE: 5/2/2023                          CONSULT REQUESTED BY: Vilma Rodriguez MD    SUBJECTIVE     PRINCIPAL PROBLEM: Hypertensive emergency    5/4/23:  Patient NPO for GT today.       REASON FOR CONSULT: GT requested    From H&P: Ms. Johnson is a 63-year-old right-handed female who was brought to the ED due to neurological symptoms.  Patient is a poor historian, only verbalized few words during my encounter, collateral from family present at bedside as well as ED provider.  Patient with recent [facility] admission, 4/26 to 4/28, presented with vision changes with slurred speech, treated for hypertensive emergency, MRI with acute right medial midbrain infarct, seen by Neurology, dual antiplatelet therapy, PT/OT recommended home health.  As per history, noted to have left facial droop with right upper extremity weakness, slurred speech with word-finding difficulties, reported initial episode started yesterday but somewhat improved, reoccurred around lunchtime (12:30 pm) today which prompted ED presentation.  Patient denies headache, swallowing difficulties, does reports vision abnormalities but unclear if new or chronic, does report weakness right upper extremity, she lives with her father at home who assist her, reports did take all medications this morning. Family is concerned about high salt diet. Patient is a non smoker.  BP on presentation 201/96.  CBC not available, discussed with lab, needs to be recollected, communicated with nursing.  Sodium 136, BUN/creatinine 27/1.2, INR 1, LDL 65, troponin 2.6, glucose 131.  Chest x-ray poor expansion with no acute abnormalities, CT head no acute hemorrhage, CTA no large vessel occlusion.  She was evaluated by tele stroke, Dr Goodman, out of window for tPA, MRI brain without contrast, continuing dual antiplatelet therapy,  allowing for permissive hypertension up to 210/120.  Case discussed with ED provider who requested admission.  Plan of care was discussed with family members present at bedside.         HPI:    Patient is a 63 year old female who presented to the Er with complaints of visual changes. Patient noted to have significantly elevated Bp on arrival and also was noted to have acute ischemic infarct. Cardiology was consulted for GT. She was seen sitting up in chair and able to discuss procedure with us.       Review of patient's allergies indicates:  No Known Allergies    Past Medical History:   Diagnosis Date    Arthritis     Complete tear of left rotator cuff 11/09/2017    COPD (chronic obstructive pulmonary disease)     COVID-19 infection 07/02/2021    Diabetes mellitus     H/o Cerebrovascular accident (CVA) of left pontine structure 12/12/2019    Hypertension     Personal history of colonic polyps     Stroke     Wears glasses      Past Surgical History:   Procedure Laterality Date    COLONOSCOPY N/A 4/11/2017    Procedure: COLONOSCOPY;  Surgeon: Jared Antonio MD;  Location: [facility];  Service: Endoscopy;  Laterality: N/A;    HYSTERECTOMY      OOPHORECTOMY      SHOULDER SURGERY      R     Social History     Tobacco Use    Smoking status: Never    Smokeless tobacco: Never   Substance Use Topics    Alcohol use: No    Drug use: No        REVIEW OF SYSTEMS  CONSTITUTIONAL: Negative for chills, fatigue and fever.   NEURO: No headaches, No dizziness; + right hand weakness   RESPIRATORY: Negative for cough, shortness of breath and wheezing.    CARDIOVASCULAR: Negative for chest pain. Negative for palpitations and leg swelling.   GI: Negative for abdominal pain, No melena, diarrhea, nausea and vomiting.   : Negative for dysuria and frequency, Negative for hematuria  SKIN: Negative for bruising, Negative for edema or discoloration noted.   PSYCHIATRIC: Negative for depression, Negative for anxiety, Negative for memory  loss  MUSCULOSKELETAL: Negative for neck pain, Negative for muscle weakness, Negative for back pain     OBJECTIVE     VITAL SIGNS (Most Recent)  Temp: 98.6 °F (37 °C) (notified nurse Rita of high b/p) (05/04/23 0749)  Pulse: (!) 54 (05/04/23 1330)  Resp: 15 (05/04/23 1330)  BP: (!) 175/81 (05/04/23 1330)  SpO2: 99 % (05/04/23 1330)    VENTILATION STATUS  Resp: 15 (05/04/23 1330)  SpO2: 99 % (05/04/23 1330)           I & O (Last 24H):  Intake/Output Summary (Last 24 hours) at 5/4/2023 1404  Last data filed at 5/3/2023 1549  Gross per 24 hour   Intake 240 ml   Output 350 ml   Net -110 ml         WEIGHTS  Wt Readings from Last 1 Encounters:   05/04/23 0315 62.2 kg (137 lb 2 oz)   05/02/23 2230 61.1 kg (134 lb 11.2 oz)   05/02/23 1700 59 kg (130 lb)       PHYSICAL EXAM  CONSTITUTIONAL: No fever, no chills  HEENT: Normocephalic, atraumatic,pupils reactive to light                 NECK:  No JVD no carotid bruit  CVS: S1S2+, RRR  LUNGS: Clear  ABDOMEN: Soft, NT, BS+  EXTREMITIES: No cyanosis, edema  : No del rosario catheter  NEURO: AAO X 3  PSY: Normal affect      HOME MEDICATIONS:  No current facility-administered medications on file prior to encounter.     Current Outpatient Medications on File Prior to Encounter   Medication Sig Dispense Refill    amLODIPine (NORVASC) 10 MG tablet Take 1 tablet (10 mg total) by mouth once daily. 30 tablet 0    aspirin 81 MG Chew Take 1 tablet (81 mg total) by mouth once daily. 30 tablet 0    atorvastatin (LIPITOR) 40 MG tablet Take 1 tablet (40 mg total) by mouth once daily. 90 tablet 1    cloNIDine 0.1 mg/24 hr td ptwk (CATAPRES) 0.1 mg/24 hr Place 1 patch onto the skin every 7 days. 12 patch 1    clopidogreL (PLAVIX) 75 mg tablet Take 1 tablet (75 mg total) by mouth once daily. 90 tablet 1    dexAMETHasone (OZURDEX) 0.7 mg Impl intravitreal implant 0.7 mg by Intravitreal route once.      hydrALAZINE (APRESOLINE) 100 MG tablet Take 1 tablet (100 mg total) by mouth 3 (three) times daily.  "270 tablet 1    insulin aspart U-100 (NOVOLOG) 100 unit/mL injection Inject into the skin 3 (three) times daily before meals.      insulin degludec (TRESIBA FLEXTOUCH U-100) 100 unit/mL (3 mL) insulin pen ADMINISTER 40 UNITS UNDER THE SKIN EVERY EVENING (Patient taking differently: Inject 40 Units into the skin every evening. ADMINISTER 40 UNITS UNDER THE SKIN EVERY EVENING) 12 pen 1    losartan (COZAAR) 50 MG tablet Take 1 tablet (50 mg total) by mouth once daily. 90 tablet 1    metoprolol succinate (TOPROL-XL) 100 MG 24 hr tablet Take 1 tablet (100 mg total) by mouth once daily. 90 tablet 1    tiotropium (SPIRIVA) 18 mcg inhalation capsule Inhale 1 capsule (18 mcg total) into the lungs once daily. Controller 90 capsule 3    pen needle, diabetic (BD ULTRA-FINE SHORT PEN NEEDLE) 31 gauge x 5/16" Ndle 1 pen by Misc.(Non-Drug; Combo Route) route 4 (four) times daily. 300 each 5    [DISCONTINUED] blood-glucose meter (TRUE METRIX GLUCOSE METER) kit Use as instructed 1 each 0    [DISCONTINUED] chlorthalidone (HYGROTEN) 50 MG Tab Take 1 tablet (50 mg total) by mouth once daily. 90 tablet 3    [DISCONTINUED] lancing device (TRUEDRAW LANCING DEVICE) Misc Inject 1 Device into the skin 2 (two) times daily. 1 each 3       SCHEDULED MEDS:   aspirin  81 mg Oral Daily    atorvastatin  40 mg Oral QHS    cloNIDine 0.1 mg/24 hr td ptwk  1 patch Transdermal Q7 Days    enoxaparin  40 mg Subcutaneous Daily    hydrALAZINE  50 mg Oral Q8H    insulin detemir U-100 (Levemir)  40 Units Subcutaneous QHS    ipratropium  0.5 mg Nebulization Q6H    losartan  50 mg Oral Daily    metoprolol succinate  100 mg Oral Daily       CONTINUOUS INFUSIONS:    PRN MEDS:acetaminophen, dextrose 50%, glucagon (human recombinant), insulin aspart U-100, labetalol, ondansetron, senna-docusate 8.6-50 mg, sodium chloride 0.9%    LABS AND DIAGNOSTICS     CBC LAST 3 DAYS  Recent Labs   Lab 04/28/23  0324 05/02/23  2254 05/03/23  0343   WBC 9.98 10.47 10.38   RBC 4.33 " 4.54 4.50   HGB 12.2 12.6 12.6   HCT 34.4* 35.9* 35.9*   MCV 79* 79* 80*   MCH 28.2 27.8 28.0   MCHC 35.5 35.1 35.1   RDW 14.8* 15.2* 15.2*    433 452*   MPV 10.4 10.3 10.8   GRAN  --  62.5  6.5  --    LYMPH  --  25.6  2.7  --    MONO  --  9.8  1.0  --    BASO  --  0.05  --    NRBC  --  0  --          COAGULATION LAST 3 DAYS  Recent Labs   Lab 05/02/23  1750   INR 1.0         CHEMISTRY LAST 3 DAYS  Recent Labs   Lab 04/28/23  0324 05/02/23  1750 05/03/23  0343    136 139   K 3.8 4.6 4.0    108 111*   CO2 21* 20* 21*   ANIONGAP 9 8 7*   BUN 31* 27* 23   CREATININE 1.4 1.2 1.0   * 121* 91   CALCIUM 9.0 9.2 9.1   MG  --   --  2.0   ALBUMIN  --  3.7  --    PROT  --  7.6  --    ALKPHOS  --  71  --    ALT  --  24  --    AST  --  24  --    BILITOT  --  0.7  --          CARDIAC PROFILE LAST 3 DAYS  Recent Labs   Lab 05/02/23  1750   TROPONINIHS 2.6         ENDOCRINE LAST 3 DAYS  Recent Labs   Lab 05/02/23  1750   TSH 4.180         LAST ARTERIAL BLOOD GAS  ABG  No results for input(s): PH, PO2, PCO2, HCO3, BE in the last 168 hours.    LAST 7 DAYS MICROBIOLOGY   Microbiology Results (last 7 days)       ** No results found for the last 168 hours. **            MOST RECENT IMAGING  MRI Brain Without Contrast  MR BRAIN WITHOUT IV CONTRAST    Comparisons: Brain MRI with and without contrast dated April 27, 2023    Additional pertinent history: Stroke    TECHNIQUE:  Multiplanar, multisequence brain MRI was performed without gadolinium contrast.    FINDINGS:    Sagittal midline structures and craniocervical junction: Negative.    Midline shift: None.    Ventricles: Negative.    Brain parenchyma:  Diffusion weighted imaging: Tiny region of restricted diffusion involving the posterior aspects of the right midbrain as well as a linear region of restricted diffusion within the mid left temporal lobe.  Gradient sequence: Continued findings of multiple foci of magnetic susceptibility within the brain  parenchyma in a distribution similar to the previous exam with differential considerations including chronic small microvascular hemorrhages related to hypertensive episodes versus amyloid angiopathy.  T2 weighted FLAIR images: Patchy hypoattenuation within the periventricular and subcortical white matter, nonspecific but likely representing small vessel ischemic change on a chronic basis.    Extra-axial spaces: Mild cerebral atrophy.    Dural venous sinuses and major arterial flow voids: Negative.    Mastoid air cells and paranasal sinuses: Negative.    Surrounding soft tissues and orbits: Negative.    Impression:  1. Superimposed upon chronic age-related changes and multiple regions of either chronic microhemorrhage or amyloid angiopathy are findings of 2 small regions of acute infarct within the right posterior midbrain in the mid left temporal lobe.  2. Other age related changes as described above.    Electronically signed by:  Elieser Preciado MD  5/2/2023 10:59 PM CDT Workstation: [id number]  X-Ray Chest AP Portable  EXAM: XR CHEST AP PORTABLE    HISTORY: Stroke. Possible aspiration.    COMPARISON:Chest x-ray dated 4/26/2023    FINDINGS: The lungs are somewhat poorly inflated. There is mild linear atelectasis in the left midlung zone. No acute focal infiltrates are identified. There are no pleural effusions. The cardiomediastinal silhouette is unremarkable.    IMPRESSION:   Poor lung expansion. No evidence of acute disease within the chest.    Electronically signed by:  Haresh Frausto MD  5/2/2023 7:05 PM CDT Workstation: [id number]  CTA Head and Neck (xpd)  EXAM: CTA HEAD AND NECK (XPD)    HISTORY: Stroke/TIA, determine embolic source    COMPARISON: CTA of the head and neck dated 4/27/2023. MRI of the brain dated 4/27/2023.    TECHNIQUE: Multiple axial 1 mm thick images were obtained through the neck and head during rapid IV injection of contrast. Coronal and sagittal reconstructions were produced.    This exam  was performed according to our departmental dose-optimization program, which includes automated exposure control, adjustment of the mA and/or kV according to patient size and/or use of iterative reconstruction technique.    Unless otherwise stated, incidental findings do not require dedicated follow-up imaging.    FINDINGS: There is normal branching of the great vessels from the aortic arch that all appear widely patent. No stenoses are evident within either common or external or internal carotid artery within the neck. The carotid bifurcations are normal. The vertebral arteries are codominant and are both widely patent throughout their course neck and head. No atherosclerotic disease is evident within the intracranial arterial vasculature. There are no significant stenoses or focal intracranial occlusions. No intentional embolic source is demonstrated. Note that review of the patient's MRI shows evidence of extensive small vessel chronic ischemic change and also extensive amyloid angiopathy which likely accounts for the patient's infarcts. The overall appearance is not typical of embolic infarcts. That study showed a punctate acute ischemic infarct in the superior aspect of the midbrain to the right of midline that is quite likely not be embolic in etiology. There are no aneurysms. Normal enhancement is observed in the dural venous sinuses. There is no abnormal enhancement within the brain parenchyma or brainstem.    IMPRESSION:   Unremarkable CT angiography imaging of the neck and head. There is essentially no evidence of atherosclerotic disease. There are no significant stenoses or focal occlusions within the arterial vasculature of the neck or head. See above discussion.    Electronically signed by:  Haresh Frausto MD  5/2/2023 6:11 PM CDT Workstation: [id number]  CT HEAD FOR STROKE  EXAM: CT HEAD FOR STROKE    HISTORY: Neuro deficit, acute, stroke suspected    COMPARISON: CT scan of the head dated  4/26/2023    TECHNIQUE: Multiple axial 3.0 mm thick images were acquired from the base of the skull to the vertex without IV contrast.    This exam was performed according to our departmental dose-optimization program, which includes automated exposure control, adjustment of the mA and/or kV according to patient size and/or use of iterative reconstruction technique.    Unless otherwise stated, incidental findings do not require dedicated follow-up imaging.    FINDINGS: There is patchy ill-defined abnormal diminished attenuation throughout the white matter of both cerebral hemispheres that appears similar on the previous CT scan and is consistent with sequela of extensive small vessel chronic ischemic change. No definite acute infarcts are identified. There is no acute intracranial hemorrhage. There is a small chronic infarct in the medial aspect of the left frontal lobe in the vascular territory of the left anterior cerebral artery that is unchanged. There is also small chronic infarct in the posterior aspect of the regina that is unchanged. This was better shown on the MRI study dated 4/27/2023 which also showed a punctate acute lacunar infarct in the far superior aspect of the midbrain on the right. There are no discrete intracranial masses. The paranasal sinuses and mastoid air cells and middle ear cavities are well aerated.    IMPRESSION:   Evidence of extensive small vessel chronic ischemic change as described. Small chronic lacunar infarct in the posterior aspect of the regina. No acute intracranial abnormality is identified.    Electronically signed by:  Haresh Frausto MD  5/2/2023 5:58 PM CDT Workstation: [id number]      ECHOCARDIOGRAM RESULTS (last 5)  Results for orders placed during the hospital encounter of 04/26/23    Echo Saline Bubble? No    Interpretation Summary  · The left ventricle is normal in size with normal systolic function.  · Indeterminate left ventricular diastolic function.  · The estimated  ejection fraction is 62%.  · Normal right ventricular size with normal right ventricular systolic function.  · Normal central venous pressure (3 mmHg).      Results for orders placed during the hospital encounter of 05/18/22    Echo Saline Bubble? No    Interpretation Summary  · There is no evidence of intracardiac shunting.  · The left ventricle is normal in size with normal systolic function.  · The estimated ejection fraction is 65%.  · Indeterminate left ventricular diastolic function.  · Normal right ventricular size with normal right ventricular systolic function.  · No interatrial septal defect present.  · Mild aortic regurgitation.      Results for orders placed during the hospital encounter of 07/02/21    Echo    Interpretation Summary  · Normal central venous pressure (3 mmHg).  · The left ventricle is normal in size with mild concentric hypertrophy and normal systolic function.  · The estimated ejection fraction is 53%.  · Grade I left ventricular diastolic dysfunction.  · Normal right ventricular size with normal right ventricular systolic function.      Results for orders placed during the hospital encounter of 12/12/19    Echo Color Flow Doppler? Yes; Bubble Contrast? Yes    Interpretation Summary  · Concentric left ventricular remodeling.  · Normal left ventricular systolic function. The estimated ejection fraction is 54%  · Grade I (mild) left ventricular diastolic dysfunction consistent with impaired relaxation.  · Normal right ventricular systolic function.  · No interatrial septal defect present.  · Normal central venous pressure (3 mm Hg).  · No PFO on color flow and saline bubble study      CURRENT/PREVIOUS VISIT EKG  Results for orders placed or performed during the hospital encounter of 05/02/23   EKG 12-lead    Collection Time: 05/04/23 11:28 AM    Narrative    Test Reason : Z01.810,    Vent. Rate : 055 BPM     Atrial Rate : 055 BPM     P-R Int : 164 ms          QRS Dur : 070 ms      QT Int :  450 ms       P-R-T Axes : 052 -02 096 degrees     QTc Int : 430 ms    Sinus bradycardia  Nonspecific T wave abnormality  Abnormal ECG  When compared with ECG of 02-MAY-2023 18:21,  No significant change was found    Referred By: REJI   SELF           Confirmed By:            ASSESSMENT/PLAN:     Active Hospital Problems    Diagnosis    *Hypertensive emergency    Acute ischemic stroke     CKD (chronic kidney disease), stage III    History of stroke    Frequent falls    Type 2 diabetes mellitus with both eyes affected by moderate nonproliferative retinopathy and macular edema, with long-term current use of insulin    Type 2 diabetes mellitus with diabetic nephropathy, HbA1c 8.9%    Mixed hyperlipidemia    Hypertension associated with diabetes       ASSESSMENT & PLAN:     Acute ischemic CVA  HTN emergency  CKD stage III  Type II DM  Falls  Hx of CVA      RECOMMENDATIONS:    GT today.     Tonia Tobar NP  Date of Service: 05/04/2023  11:21 AM     I have personally interviewed and examined the patient, I have reviewed the Nurse Practitioner's history and physical, assessment, and plan. I have personally evaluated the patient at bedside and agree with the findings and made appropriate changes as necessary in recommendations.      Blade Phillip MD  Department of Cardiology  [facility]  05/04/2023 11:22 AM

## 2023-05-04 NOTE — NURSING TRANSFER
Nursing Transfer Note      5/4/2023     Reason patient is being transferred: s/p GT     Transfer To: 2504 from 1401    Transfer via wheelchair    Transfer with cardiac monitoring

## 2023-05-04 NOTE — INTERVAL H&P NOTE
The patient has been examined and the H&P has been reviewed:    I concur with the findings and no changes have occurred since H&P was written.    Procedure risks, benefits and alternative options discussed and understood by patient/family.          Active Hospital Problems    Diagnosis  POA    *Hypertensive emergency [I16.1]  Yes    Acute ischemic stroke  [I63.9]  Yes    CKD (chronic kidney disease), stage III [N18.30]  Yes     Chronic    History of stroke [Z86.73]  Not Applicable     Chronic    Frequent falls [R29.6]  Not Applicable    Type 2 diabetes mellitus with both eyes affected by moderate nonproliferative retinopathy and macular edema, with long-term current use of insulin [E11.3313, Z79.4]  Not Applicable    Type 2 diabetes mellitus with diabetic nephropathy, HbA1c 8.9% [E11.21]  Yes     Chronic    Mixed hyperlipidemia [E78.2]  Yes    Hypertension associated with diabetes [E11.59, I15.2]  Yes      Resolved Hospital Problems   No resolved problems to display.

## 2023-05-04 NOTE — PROGRESS NOTES
[facility]  Department of Neurology  Progress Note  Date: 2023 2:53 PM          Patient Name: Steff Johnson   MRN: [id number]   : 1960    AGE: 63 y.o.    LOS: 2 days Hospital Day: 3  Admit date: 2023  4:57 PM        HPI per EMR: Steff Johnson is a 63 y.o. female  who was brought to the ED due to neurological symptoms.  Patient is a poor historian, only verbalized few words during my encounter, collateral from family present at bedside as well as ED provider.  Patient with recent [facility] admission,  to , presented with vision changes with slurred speech, treated for hypertensive emergency, MRI with acute right medial midbrain infarct, seen by Neurology, dual antiplatelet therapy, PT/OT recommended home health.  As per history, noted to have left facial droop with right upper extremity weakness, slurred speech with word-finding difficulties, reported initial episode started yesterday but somewhat improved, reoccurred around lunchtime (12:30 pm) today which prompted ED presentation.  Patient denies headache, swallowing difficulties, does reports vision abnormalities but unclear if new or chronic, does report weakness right upper extremity, she lives with her father at home who assist her, reports did take all medications this morning. Family is concerned about high salt diet. Patient is a non smoker.  BP on presentation 201/96.  CBC not available, discussed with lab, needs to be recollected, communicated with nursing.  Sodium 136, BUN/creatinine 27/1.2, INR 1, LDL 65, troponin 2.6, glucose 131.  Chest x-ray poor expansion with no acute abnormalities, CT head no acute hemorrhage, CTA no large vessel occlusion.  She was evaluated by tele stroke, Dr Goodman, out of window for tPA, MRI brain without contrast, continuing dual antiplatelet therapy, allowing for permissive hypertension up to 210/120.  Case discussed with ED provider who requested admission.  Plan of care was discussed with  family members present at bedside     Neurology consult:  Patient was seen and examined by me.  Patient is a poor historian however states that she presented with worsening right-sided weakness.     Per chart review, she presented with right-sided weakness, slurred speech and word-finding difficulties which however improved and reoccurred around noon for which she was brought to the ED.     On exam, she has a mild right-sided facial droop, mild right upper extremity weakness and slurred speech.  She is admitted for further workup.    05/04/2023: No acute events overnight. Patient was seen and examined by me this morning. Neuro exam unchanged from yesterday.  She denies any new complaints.       Vitals:  Patient Vitals for the past 24 hrs:   BP Temp Temp src Pulse Resp SpO2 Weight   05/04/23 1441 (!) 193/99 97.5 °F (36.4 °C) Oral (!) 56 16 98 % --   05/04/23 1330 (!) 175/81 -- -- (!) 54 15 99 % --   05/04/23 1315 (!) 158/80 -- -- (!) 56 (!) 21 99 % --   05/04/23 1300 130/67 -- -- (!) 50 18 100 % --   05/04/23 1100 (!) 194/91 -- -- (!) 57 12 99 % --   05/04/23 0749 (!) 178/84 98.6 °F (37 °C) Oral 62 18 99 % --   05/04/23 0315 (!) 162/85 97.8 °F (36.6 °C) Axillary (!) 52 16 98 % 62.2 kg (137 lb 2 oz)   05/04/23 0131 -- -- -- 75 16 97 % --   05/03/23 2300 (!) 159/82 97.9 °F (36.6 °C) Oral (!) 57 19 97 % --   05/03/23 2047 -- -- -- 63 19 96 % --   05/03/23 1926 (!) 196/98 98 °F (36.7 °C) Oral 63 20 96 % --   05/03/23 1546 (!) 183/86 98 °F (36.7 °C) Oral 61 20 97 % --     PHYSICAL EXAM:     GENERAL APPEARANCE: Well-developed, well-nourished female in no acute distress.  HEENT: Normocephalic and atraumatic. PERRL. Oropharynx unremarkable.  PULM: Comfortable on room air.  CV: RRR.  ABDOMEN: Soft, nontender.  EXTREMITIES: No signs of vascular compromise. Pulses present. No cyanosis, clubbing or edema.  SKIN: Clear; no rashes, lesions or skin breaks in exposed areas.      NEURO:   MENTAL STATUS: Patient awake and oriented to  time, place, and person. Affect normal.  CRANIAL NERVES II-XII: Pupils equal, round and reactive to light. Extraocular movements full and intact.  Right facial asymmetry.  Speech is dysarthric  MOTOR: Normal bulk. Tone normal and symmetrical throughout.  No abnormal movements. No tremor.   Strength 4+/5 in right upper and lower extremities, 5/5 on left upper and lower extremities.  REFLEXES: DTRs 1+; normal and symmetric throughout.   SENSATION: Sensation grossly intact to fine touch.  COORDINATION: Finger-to-nose normal for age and symmetric.  STATION: Romberg deferred.  GAIT: Deferred.    CURRENT SCHEDULED MEDICATIONS:   aspirin  81 mg Oral Daily    atorvastatin  40 mg Oral QHS    cloNIDine 0.1 mg/24 hr td ptwk  1 patch Transdermal Q7 Days    enoxaparin  40 mg Subcutaneous Daily    hydrALAZINE  50 mg Oral Q8H    insulin detemir U-100 (Levemir)  40 Units Subcutaneous QHS    ipratropium  0.5 mg Nebulization Q6H    losartan  50 mg Oral Daily    metoprolol succinate  100 mg Oral Daily     CURRENT INFUSIONS:    DATA:  Recent Labs   Lab 04/27/23  1628 04/28/23 0324 05/02/23  1750 05/03/23  0343    136 136 139   K 3.8 3.8 4.6 4.0    106 108 111*   CO2 23 21* 20* 21*   BUN 26* 31* 27* 23   CREATININE 1.2 1.4 1.2 1.0   * 289* 121* 91   CALCIUM 9.1 9.0 9.2 9.1   PHOS  --   --   --  3.7   MG  --   --   --  2.0   AST  --   --  24  --    ALT  --   --  24  --      Recent Labs   Lab 04/28/23  0324 05/02/23 2254 05/03/23  0343   WBC 9.98 10.47 10.38   HGB 12.2 12.6 12.6   HCT 34.4* 35.9* 35.9*    433 452*     No results found for: PROTEINCSF, GLUCCSF, WBCCSF, RBCCSF, PMNCSF  Hemoglobin A1C   Date Value Ref Range Status   05/03/2023 10.2 (H) 4.5 - 6.2 % Final     Comment:     According to ADA guidelines, hemoglobin A1C <7.0% represents  optimal control in non-pregnant diabetic patients.  Different  metrics may apply to specific populations.   Standards of Medical Care in Diabetes - 2016.    For the  purpose of screening for the presence of diabetes:  <5.7%     Consistent with the absence of diabetes  5.7-6.4%  Consistent with increasing risk for diabetes   (prediabetes)  >or=6.5%  Consistent with diabetes    Currently no consensus exists for use of hemoglobin A1C  for diagnosis of diabetes for children.     02/22/2023 8.9 (H) 4.0 - 5.6 % Final     Comment:     ADA Screening Guidelines:  5.7-6.4%  Consistent with prediabetes  >or=6.5%  Consistent with diabetes    High levels of fetal hemoglobin interfere with the HbA1C  assay. Heterozygous hemoglobin variants (HbS, HgC, etc)do  not significantly interfere with this assay.   However, presence of multiple variants may affect accuracy.     12/10/2022 8.6 (H) 4.0 - 5.6 % Final     Comment:     ADA Screening Guidelines:  5.7-6.4%  Consistent with prediabetes  >or=6.5%  Consistent with diabetes    High levels of fetal hemoglobin interfere with the HbA1C  assay. Heterozygous hemoglobin variants (HbS, HgC, etc)do  not significantly interfere with this assay.   However, presence of multiple variants may affect accuracy.              I have personally reviewed and interpreted the pertinent imaging and lab results.  Imaging Results              MRI Brain Without Contrast (Final result)  Result time 05/02/23 22:59:18      Final result by Elieser Preciado MD (05/02/23 22:59:18)                   Narrative:    MR BRAIN WITHOUT IV CONTRAST    Comparisons: Brain MRI with and without contrast dated April 27, 2023    Additional pertinent history: Stroke    TECHNIQUE:  Multiplanar, multisequence brain MRI was performed without gadolinium contrast.    FINDINGS:    Sagittal midline structures and craniocervical junction: Negative.    Midline shift: None.    Ventricles: Negative.    Brain parenchyma:  Diffusion weighted imaging: Tiny region of restricted diffusion involving the posterior aspects of the right midbrain as well as a linear region of restricted diffusion within the mid  left temporal lobe.  Gradient sequence: Continued findings of multiple foci of magnetic susceptibility within the brain parenchyma in a distribution similar to the previous exam with differential considerations including chronic small microvascular hemorrhages related to hypertensive episodes versus amyloid angiopathy.  T2 weighted FLAIR images: Patchy hypoattenuation within the periventricular and subcortical white matter, nonspecific but likely representing small vessel ischemic change on a chronic basis.    Extra-axial spaces: Mild cerebral atrophy.    Dural venous sinuses and major arterial flow voids: Negative.    Mastoid air cells and paranasal sinuses: Negative.    Surrounding soft tissues and orbits: Negative.    Impression:  1. Superimposed upon chronic age-related changes and multiple regions of either chronic microhemorrhage or amyloid angiopathy are findings of 2 small regions of acute infarct within the right posterior midbrain in the mid left temporal lobe.  2. Other age related changes as described above.    Electronically signed by:  Elieser Preciado MD  5/2/2023 10:59 PM CDT Workstation: [id number]                                     X-Ray Chest AP Portable (Final result)  Result time 05/02/23 19:05:01      Final result by Otilio Frausto MD (05/02/23 19:05:01)                   Narrative:      EXAM: XR CHEST AP PORTABLE    HISTORY: Stroke. Possible aspiration.    COMPARISON:Chest x-ray dated 4/26/2023    FINDINGS: The lungs are somewhat poorly inflated. There is mild linear atelectasis in the left midlung zone. No acute focal infiltrates are identified. There are no pleural effusions. The cardiomediastinal silhouette is unremarkable.    IMPRESSION:   Poor lung expansion. No evidence of acute disease within the chest.    Electronically signed by:  Haresh Frausto MD  5/2/2023 7:05 PM CDT Workstation: [id number]                                     CTA Head and Neck (xpd) (Final result)  Result time  05/02/23 18:11:30      Final result by Otilio Frausto MD (05/02/23 18:11:30)                   Narrative:      EXAM: CTA HEAD AND NECK (XPD)    HISTORY: Stroke/TIA, determine embolic source    COMPARISON: CTA of the head and neck dated 4/27/2023. MRI of the brain dated 4/27/2023.    TECHNIQUE: Multiple axial 1 mm thick images were obtained through the neck and head during rapid IV injection of contrast. Coronal and sagittal reconstructions were produced.    This exam was performed according to our departmental dose-optimization program, which includes automated exposure control, adjustment of the mA and/or kV according to patient size and/or use of iterative reconstruction technique.    Unless otherwise stated, incidental findings do not require dedicated follow-up imaging.    FINDINGS: There is normal branching of the great vessels from the aortic arch that all appear widely patent. No stenoses are evident within either common or external or internal carotid artery within the neck. The carotid bifurcations are normal. The vertebral arteries are codominant and are both widely patent throughout their course neck and head. No atherosclerotic disease is evident within the intracranial arterial vasculature. There are no significant stenoses or focal intracranial occlusions. No intentional embolic source is demonstrated. Note that review of the patient's MRI shows evidence of extensive small vessel chronic ischemic change and also extensive amyloid angiopathy which likely accounts for the patient's infarcts. The overall appearance is not typical of embolic infarcts. That study showed a punctate acute ischemic infarct in the superior aspect of the midbrain to the right of midline that is quite likely not be embolic in etiology. There are no aneurysms. Normal enhancement is observed in the dural venous sinuses. There is no abnormal enhancement within the brain parenchyma or brainstem.    IMPRESSION:   Unremarkable CT  angiography imaging of the neck and head. There is essentially no evidence of atherosclerotic disease. There are no significant stenoses or focal occlusions within the arterial vasculature of the neck or head. See above discussion.    Electronically signed by:  Haresh Frausto MD  5/2/2023 6:11 PM CDT Workstation: [id number]                                     CT HEAD FOR STROKE (Final result)  Result time 05/02/23 17:58:00   Procedure changed from CT Head Without Contrast     Final result by Otilio Frausto MD (05/02/23 17:58:00)                   Narrative:      EXAM: CT HEAD FOR STROKE    HISTORY: Neuro deficit, acute, stroke suspected    COMPARISON: CT scan of the head dated 4/26/2023    TECHNIQUE: Multiple axial 3.0 mm thick images were acquired from the base of the skull to the vertex without IV contrast.    This exam was performed according to our departmental dose-optimization program, which includes automated exposure control, adjustment of the mA and/or kV according to patient size and/or use of iterative reconstruction technique.    Unless otherwise stated, incidental findings do not require dedicated follow-up imaging.    FINDINGS: There is patchy ill-defined abnormal diminished attenuation throughout the white matter of both cerebral hemispheres that appears similar on the previous CT scan and is consistent with sequela of extensive small vessel chronic ischemic change. No definite acute infarcts are identified. There is no acute intracranial hemorrhage. There is a small chronic infarct in the medial aspect of the left frontal lobe in the vascular territory of the left anterior cerebral artery that is unchanged. There is also small chronic infarct in the posterior aspect of the regina that is unchanged. This was better shown on the MRI study dated 4/27/2023 which also showed a punctate acute lacunar infarct in the far superior aspect of the midbrain on the right. There are no discrete intracranial  masses. The paranasal sinuses and mastoid air cells and middle ear cavities are well aerated.    IMPRESSION:   Evidence of extensive small vessel chronic ischemic change as described. Small chronic lacunar infarct in the posterior aspect of the regina. No acute intracranial abnormality is identified.    Electronically signed by:  Haresh Frausto MD  5/2/2023 5:58 PM CDT Workstation: [id number]                                            ASSESSMENT AND PLAN:       Acute ischemic stroke  Intracranial Microhemorrhages        Workup  CTH: No acute intracranial abnormality   CTA head and neck:  No acute large vessel occlusion or high-grade stenosis of intra or extracranial arteries  MRI brain:  Multiple areas of restricted diffusion involving the right posterior midbrain, left temporal lobe, left coronary radiata indicating acute infarcts.  GT:  Normal appearing left atrial appendage. No thrombus is present in the appendage. EF 60%, no PFO        Plan  Admitted for further stroke workup  Start with Aspirin 81 mg and Lipitor 80mg.  Hold Plavix in the setting of multiple intracranial microhemorrhages with increased risk of intracranial hemorrhage.  Etiology of stroke is unknown however could be cardioembolic.  outpatient holter monitor for 4 weeks to look for afib  Normalize BP  PT OT  Speech therapy  DVT prophylaxis with chemo/SCD prophylaxis  Discussed lifestyle modifications as prophylactic measures for stroke prevention including adequate blood pressure management, healthy diet and regular exercise.        [phone] within 7 days from discharge.     Stroke education was provided including stroke risk factors modification and any acute neurological changes including weakness, confusion, visual changes to come straight to the ER.     All questions were answered.                               Geoffrey Roque MD  Neurology/vascular Neurology  Date of Service: 05/04/2023   2:53 PM    Please note: This note was transcribed using voice recognition software. Because of this technology there are often uinintended grammatical, spelling, and other transcription errors. Please disregard these errors.

## 2023-05-04 NOTE — ANESTHESIA POSTPROCEDURE EVALUATION
Anesthesia Post Evaluation    Patient: Steff Johnson    Procedure(s) Performed: Procedure(s) (LRB):  Transesophageal echo (GT) intra-procedure log documentation (N/A)    Final Anesthesia Type: general      Patient location: [facility].  Patient participation: Yes- Able to Participate  Level of consciousness: awake and alert  Post-procedure vital signs: reviewed and stable  Pain management: adequate  Airway patency: patent    PONV status at discharge: No PONV  Anesthetic complications: no      Cardiovascular status: stable  Respiratory status: unassisted  Hydration status: euvolemic  Follow-up not needed.          Vitals Value Taken Time   /85 05/04/23 1345   Temp 37 °C (98.6 °F) 05/04/23 0749   Pulse 53 05/04/23 1345   Resp 16 05/04/23 1345   SpO2 100 % 05/04/23 1345   Vitals shown include unvalidated device data.      No case tracking events are documented in the log.      Pain/Estefania Score: Pain Rating Prior to Med Admin: 6 (right shoulder) (5/3/2023 10:44 AM)  Pain Rating Post Med Admin: 2 (5/3/2023 11:44 AM)  Estefania Score: 10 (5/4/2023  1:00 PM)

## 2023-05-04 NOTE — CARE UPDATE
05/03/23 2047   Patient Assessment/Suction   Level of Consciousness (AVPU) alert   Respiratory Effort Normal   Expansion/Accessory Muscles/Retractions no use of accessory muscles   All Lung Fields Breath Sounds clear   Rhythm/Pattern, Respiratory unlabored   PRE-TX-O2   Device (Oxygen Therapy) room air   SpO2 96 %   Pulse Oximetry Type Intermittent   $ Pulse Oximetry - Multiple Charge Pulse Oximetry - Multiple   Pulse 63   Resp 19   Aerosol Therapy   $ Aerosol Therapy Charges Aerosol Treatment   Daily Review of Necessity (SVN) completed   Respiratory Treatment Status (SVN) given   Treatment Route (SVN) mask   Patient Position (SVN) semi-Berman's   Post Treatment Assessment (SVN) breath sounds unchanged;vital signs unchanged   Signs of Intolerance (SVN) none   Education   $ Education Bronchodilator;15 min   Respiratory Evaluation   $ Care Plan Tech Time 15 min

## 2023-05-04 NOTE — PLAN OF CARE
05/04/23 0749   Patient Assessment/Suction   Level of Consciousness (AVPU) alert   Respiratory Effort Normal;Unlabored   All Lung Fields Breath Sounds clear   Rhythm/Pattern, Respiratory pattern regular;unlabored;depth regular   PRE-TX-O2   Device (Oxygen Therapy) room air   SpO2 99 %   Pulse Oximetry Type Intermittent   $ Pulse Oximetry - Multiple Charge Pulse Oximetry - Multiple   Pulse 62   Resp 18   Aerosol Therapy   $ Aerosol Therapy Charges Aerosol Treatment   Daily Review of Necessity (SVN) completed   Respiratory Treatment Status (SVN) given   Treatment Route (SVN) mask;oxygen   Patient Position (SVN) Berman's;HOB elevated   Post Treatment Assessment (SVN) increased aeration   Signs of Intolerance (SVN) none   Education   $ Education Bronchodilator;15 min   Respiratory Evaluation   $ Care Plan Tech Time 15 min

## 2023-05-04 NOTE — TRANSFER OF CARE
"Anesthesia Transfer of Care Note    Patient: Steff Johnson    Procedure(s) Performed: Procedure(s) (LRB):  Transesophageal echo (GT) intra-procedure log documentation (N/A)    Patient location: Telemetry/Step Down Unit    Anesthesia Type: general    Transport from OR: Transported from OR on room air with adequate spontaneous ventilation    Post pain: adequate analgesia    Post assessment: no apparent anesthetic complications    Post vital signs: stable    Level of consciousness: awake and alert    Nausea/Vomiting: no nausea/vomiting    Complications: none    Transfer of care protocol was followed      Last vitals:   Visit Vitals  BP (!) 178/84 (BP Location: Left arm, Patient Position: Lying)   Pulse 62   Temp 37 °C (98.6 °F) (Oral)   Resp 18   Ht 4' 11" (1.499 m)   Wt 62.2 kg (137 lb 2 oz)   SpO2 99%   BMI 27.70 kg/m²     "

## 2023-05-05 ENCOUNTER — CLINICAL SUPPORT (OUTPATIENT)
Dept: CARDIOLOGY | Facility: HOSPITAL | Age: 63
DRG: 065 | End: 2023-05-05
Attending: HOSPITALIST
Payer: MEDICARE

## 2023-05-05 VITALS
HEART RATE: 66 BPM | TEMPERATURE: 98 F | OXYGEN SATURATION: 97 % | WEIGHT: 137.13 LBS | HEIGHT: 59 IN | RESPIRATION RATE: 17 BRPM | DIASTOLIC BLOOD PRESSURE: 85 MMHG | SYSTOLIC BLOOD PRESSURE: 165 MMHG | BODY MASS INDEX: 27.64 KG/M2

## 2023-05-05 PROBLEM — I16.1 HYPERTENSIVE EMERGENCY: Status: RESOLVED | Noted: 2021-07-02 | Resolved: 2023-05-05

## 2023-05-05 LAB
GLUCOSE SERPL-MCNC: 127 MG/DL (ref 70–110)
GLUCOSE SERPL-MCNC: 206 MG/DL (ref 70–110)
GLUCOSE SERPL-MCNC: 79 MG/DL (ref 70–110)

## 2023-05-05 PROCEDURE — 25000003 PHARM REV CODE 250: Performed by: HOSPITALIST

## 2023-05-05 PROCEDURE — 25000242 PHARM REV CODE 250 ALT 637 W/ HCPCS: Performed by: INTERNAL MEDICINE

## 2023-05-05 PROCEDURE — 94640 AIRWAY INHALATION TREATMENT: CPT

## 2023-05-05 PROCEDURE — 93227 HOLTER MONITOR - 48 HOUR (CUPID ONLY): ICD-10-PCS | Mod: ,,, | Performed by: SPECIALIST

## 2023-05-05 PROCEDURE — 99900035 HC TECH TIME PER 15 MIN (STAT)

## 2023-05-05 PROCEDURE — 94760 N-INVAS EAR/PLS OXIMETRY 1: CPT

## 2023-05-05 PROCEDURE — 97110 THERAPEUTIC EXERCISES: CPT

## 2023-05-05 PROCEDURE — 93225 XTRNL ECG REC<48 HRS REC: CPT

## 2023-05-05 PROCEDURE — 93227 XTRNL ECG REC<48 HR R&I: CPT | Mod: ,,, | Performed by: SPECIALIST

## 2023-05-05 PROCEDURE — 97530 THERAPEUTIC ACTIVITIES: CPT | Mod: CQ

## 2023-05-05 PROCEDURE — 25000003 PHARM REV CODE 250: Performed by: INTERNAL MEDICINE

## 2023-05-05 RX ORDER — ATORVASTATIN CALCIUM 80 MG/1
80 TABLET, FILM COATED ORAL NIGHTLY
Qty: 30 TABLET | Refills: 0 | Status: ON HOLD | OUTPATIENT
Start: 2023-05-05 | End: 2024-02-29

## 2023-05-05 RX ORDER — LOSARTAN POTASSIUM 100 MG/1
100 TABLET ORAL DAILY
Qty: 30 TABLET | Refills: 0 | Status: SHIPPED | OUTPATIENT
Start: 2023-05-05 | End: 2023-06-06 | Stop reason: SDUPTHER

## 2023-05-05 RX ADMIN — LOSARTAN POTASSIUM 50 MG: 50 TABLET, FILM COATED ORAL at 08:05

## 2023-05-05 RX ADMIN — ASPIRIN 81 MG 81 MG: 81 TABLET ORAL at 08:05

## 2023-05-05 RX ADMIN — HYDRALAZINE HYDROCHLORIDE 50 MG: 25 TABLET, FILM COATED ORAL at 05:05

## 2023-05-05 RX ADMIN — IPRATROPIUM BROMIDE 0.5 MG: 0.5 SOLUTION RESPIRATORY (INHALATION) at 01:05

## 2023-05-05 RX ADMIN — METOPROLOL SUCCINATE 100 MG: 50 TABLET, FILM COATED, EXTENDED RELEASE ORAL at 08:05

## 2023-05-05 RX ADMIN — IPRATROPIUM BROMIDE 0.5 MG: 0.5 SOLUTION RESPIRATORY (INHALATION) at 07:05

## 2023-05-05 RX ADMIN — INSULIN ASPART 4 UNITS: 100 INJECTION, SOLUTION INTRAVENOUS; SUBCUTANEOUS at 12:05

## 2023-05-05 NOTE — CARE UPDATE
05/04/23 1939   Patient Assessment/Suction   Level of Consciousness (AVPU) alert   Respiratory Effort Unlabored   Expansion/Accessory Muscles/Retractions no use of accessory muscles   All Lung Fields Breath Sounds clear   Rhythm/Pattern, Respiratory unlabored   Cough Frequency infrequent   Cough Type nonproductive   PRE-TX-O2   Device (Oxygen Therapy) room air   SpO2 97 %   Pulse Oximetry Type Intermittent   $ Pulse Oximetry - Multiple Charge Pulse Oximetry - Multiple   Pulse 71   Resp 18   Aerosol Therapy   $ Aerosol Therapy Charges Aerosol Treatment   Daily Review of Necessity (SVN) completed   Respiratory Treatment Status (SVN) given   Treatment Route (SVN) mask;oxygen   Patient Position (SVN) HOB elevated   Post Treatment Assessment (SVN) breath sounds unchanged   Breath Sounds Post-Respiratory Treatment   Throughout All Fields Post-Treatment All Fields   Throughout All Fields Post-Treatment no change   Post-treatment Heart Rate (beats/min) 75   Post-treatment Resp Rate (breaths/min) 18

## 2023-05-05 NOTE — PLAN OF CARE
05/05/23 1109   Final Note   Assessment Type Final Discharge Note   Anticipated Discharge Disposition Home   What phone number can be called within the next 1-3 days to see how you are doing after discharge? [phone]   Hospital Resources/Appts/Education Provided Appointments scheduled and added to AVS   Post-Acute Status   Discharge Delays None known at this time     Patient cleared for discharge from case management standpoint. Ambulatory referral to PT provided.    Follow up appointments scheduled and added to AVS.    Chart and discharge orders reviewed.  Patient discharged home with no further case management needs.

## 2023-05-05 NOTE — HOSPITAL COURSE
63-year-old lady who is in and out of hospital who was just discharged from our service 5 days ago came with multiple neurological symptoms found to have tiny infarct in mid left temporal lobe.  She was evaluated by Neurology who recommended GT.  GT did not show any intracardiac shunting.  She appears to have very resistant hypertension however she was not taking her antihypertensives as prescribed.  I enforced education on her antihypertensives again, sent some prescriptions to [facility] pharmacy to ascertain delivery before discharge.  We also arranged Holter monitor before discharge.  She was advised to follow-up with her PCP.  I spent considerable time educating her on her antihypertensive regimen as well as diabetic regimen.  Neurology recommended to discontinue Plavix.     I have seen the patient on the day of discharge and reviewed the discharge instructions as outlined below. Patient verbalized understanding and is aware to contact primary care physician or return to ED if new or worsening symptoms.

## 2023-05-05 NOTE — PT/OT/SLP PROGRESS
Occupational Therapy   Treatment    Name: Steff Johnson  MRN: [id number]  Admitting Diagnosis:  Hypertensive emergency  1 Day Post-Op    Recommendations:     Discharge Recommendations: outpatient OT (needs 24/7 SPV at home due to high fall risk and cognitive deficits)  Discharge Equipment Recommendations:  wheelchair, rollator, shower chair  Barriers to discharge:  Other (Comment) (needs assist with ADLs/functional mobility)    Assessment:     Steff Johnson is a 63 y.o. female with a medical diagnosis of Hypertensive emergency.   Performance deficits affecting function are weakness, impaired endurance, impaired self care skills, impaired functional mobility, gait instability, impaired balance, decreased safety awareness, decreased upper extremity function, decreased lower extremity function, decreased coordination, abnormal tone, decreased ROM, impaired coordination, impaired fine motor, impaired cardiopulmonary response to activity.     Rehab Prognosis:  Fair; patient would benefit from acute skilled OT services to address these deficits and reach maximum level of function.       Plan:     Patient to be seen 5 x/week to address the above listed problems via self-care/home management, therapeutic activities, therapeutic exercises, neuromuscular re-education, wheelchair management/training  Plan of Care Expires: 06/02/23  Plan of Care Reviewed with: patient    Subjective     Pain/Comfort:  Pain Rating 1: 0/10  Pain Rating Post-Intervention 1: 0/10    Objective:     Communicated with: nurse prior to session.  Patient found supine with telemetry upon OT entry to room.    General Precautions: Standard, fall, aspiration      Treatment & Education:  Pt agreed to bed level therex only; OT completed PROM to RUE; noted tone with shoulder flexion and ER requiring extended stretch and tone inhibition; pt then completed ten repetitions of A/AAROM exercises with RUE in all major joints/planes.      Patient left HOB elevated  with all lines intact, call button in reach, and bed alarm on    GOALS:   Multidisciplinary Problems       Occupational Therapy Goals          Problem: Occupational Therapy    Goal Priority Disciplines Outcome Interventions   Occupational Therapy Goal     OT, PT/OT     Description: Goals to be met by: 6/2/23     Patient will increase functional independence with ADLs by performing:    UE Dressing with Supervision.  LE Dressing with Supervision.  Grooming while seated with Supervision.  Toileting from toilet with Supervision for hygiene and clothing management.   Toilet transfer to toilet with Supervision.                         Time Tracking:     OT Date of Treatment: 05/05/23  OT Start Time: 1037  OT Stop Time: 1047  OT Total Time (min): 10 min    Billable Minutes:Therapeutic Exercise 10    OT/LIYAH: OT          5/5/2023

## 2023-05-05 NOTE — NURSING
Discharge paperwork reviewed and discussed with patient. Pharmacy meds delivered to patient. PIV removed, intact. Tele removed. VSS. No issues/complaints. Pt transferred via wheelchair to personal ride home.

## 2023-05-05 NOTE — CARE UPDATE
05/05/23 0726   Patient Assessment/Suction   Level of Consciousness (AVPU) alert   Respiratory Effort Normal;Unlabored   Expansion/Accessory Muscles/Retractions no use of accessory muscles   All Lung Fields Breath Sounds clear;diminished   Rhythm/Pattern, Respiratory no shortness of breath reported   Cough Frequency infrequent   Cough Type no productive sputum   PRE-TX-O2   Device (Oxygen Therapy) room air   SpO2 96 %   Pulse Oximetry Type Intermittent   $ Pulse Oximetry - Single Charge Pulse Oximetry - Single   Pulse 66   Resp 16   Aerosol Therapy   $ Aerosol Therapy Charges Aerosol Treatment   Daily Review of Necessity (SVN) completed   Respiratory Treatment Status (SVN) given   Treatment Route (SVN) mask;oxygen   Patient Position (SVN) HOB elevated   Post Treatment Assessment (SVN) increased aeration   Signs of Intolerance (SVN) none   Respiratory Evaluation   $ Care Plan Tech Time 15 min

## 2023-05-05 NOTE — DISCHARGE SUMMARY
[facility] Medicine  Discharge Summary      Patient Name: Steff Johnson  MRN: [id number]  [facility]: [id number]  Patient Class: IP- Inpatient  Admission Date: 5/2/2023  Hospital Length of Stay: 3 days  Discharge Date and Time: 5/5/2023  1:17 PM  Attending Physician: No att. providers found   Discharging Provider: Vilma Rodriguez MD  Primary Care Provider: Cristina Ren MD    Primary Care Team: Networked reference to record PCT     HPI:   Ms. Johnson is a 63-year-old right-handed female who was brought to the ED due to neurological symptoms.  Patient is a poor historian, only verbalized few words during my encounter, collateral from family present at bedside as well as ED provider.  Patient with recent [facility] admission, 4/26 to 4/28, presented with vision changes with slurred speech, treated for hypertensive emergency, MRI with acute right medial midbrain infarct, seen by Neurology, dual antiplatelet therapy, PT/OT recommended home health.  As per history, noted to have left facial droop with right upper extremity weakness, slurred speech with word-finding difficulties, reported initial episode started yesterday but somewhat improved, reoccurred around lunchtime (12:30 pm) today which prompted ED presentation.  Patient denies headache, swallowing difficulties, does reports vision abnormalities but unclear if new or chronic, does report weakness right upper extremity, she lives with her father at home who assist her, reports did take all medications this morning. Family is concerned about high salt diet. Patient is a non smoker.  BP on presentation 201/96.  CBC not available, discussed with lab, needs to be recollected, communicated with nursing.  Sodium 136, BUN/creatinine 27/1.2, INR 1, LDL 65, troponin 2.6, glucose 131.  Chest x-ray poor expansion with no acute abnormalities, CT head no acute hemorrhage, CTA no large vessel occlusion.  She was evaluated by tele stroke, Dr Goodman, out of window for  tPA, MRI brain without contrast, continuing dual antiplatelet therapy, allowing for permissive hypertension up to 210/120.  Case discussed with ED provider who requested admission.  Plan of care was discussed with family members present at bedside.      Procedure(s) (LRB):  Transesophageal echo (GT) intra-procedure log documentation (N/A)      Hospital Course:   63-year-old lady who is in and out of hospital who was just discharged from our service 5 days ago came with multiple neurological symptoms found to have tiny infarct in mid left temporal lobe.  She was evaluated by Neurology who recommended GT.  GT did not show any intracardiac shunting.  She appears to have very resistant hypertension however she was not taking her antihypertensives as prescribed.  I enforced education on her antihypertensives again, sent some prescriptions to downstairs pharmacy to ascertain delivery before discharge.  We also arranged Holter monitor before discharge.  She was advised to follow-up with her PCP.  I spent considerable time educating her on her antihypertensive regimen as well as diabetic regimen.  Neurology recommended to discontinue Plavix.     I have seen the patient on the day of discharge and reviewed the discharge instructions as outlined below. Patient verbalized understanding and is aware to contact primary care physician or return to ED if new or worsening symptoms.        Goals of Care Treatment Preferences:  Code Status: Full Code      Consults:   Consults (From admission, onward)        Status Ordering Provider     Inpatient consult to Cardiology  Once        Provider:  Blade Phillip MD    Completed SHEY ROQUE consult to case management/social work  Once        Provider:  (Not yet assigned)    Completed TORRES HERNANDEZ     Inpatient consult to Neurology  Once        Provider:  Shey Roque MD    Completed TORRES HERNANDEZ     Consult to Telemedicine - Acute Stroke  Once         Provider:  (Not yet assigned)    KANNAN Pinto          No new Assessment & Plan notes have been filed under this hospital service since the last note was generated.  Service: Hospital Medicine    Final Active Diagnoses:    Diagnosis Date Noted POA    Acute ischemic stroke  [I63.9] 05/02/2023 Yes    History of stroke [Z86.73] 05/02/2023 Not Applicable     Chronic    Frequent falls [R29.6] 07/02/2021 Not Applicable    Type 2 diabetes mellitus with both eyes affected by moderate nonproliferative retinopathy and macular edema, with long-term current use of insulin [E11.3313, Z79.4] 08/07/2019 Not Applicable    Type 2 diabetes mellitus with diabetic nephropathy, HbA1c 8.9% [E11.21] 07/19/2018 Yes     Chronic    Mixed hyperlipidemia [E78.2] 07/19/2018 Yes    Hypertension associated with diabetes [E11.59, I15.2] 03/31/2012 Yes      Problems Resolved During this Admission:    Diagnosis Date Noted Date Resolved POA    PRINCIPAL PROBLEM:  Hypertensive emergency [I16.1] 07/02/2021 05/05/2023 Yes       Discharged Condition: good    Disposition: Home or Self Care    Follow Up:   Follow-up Information     Cristina Ren MD Follow up on 5/12/2023.    Specialty: Family Medicine  Why: 09:20  Contact information:  [address] [address] [phone]             Geoffrey Roque MD Follow up on 6/6/2023.    Specialty: Neurology  Why: 10am with SHWETA Lange  Contact information:  [address]   [place] [id number] [phone]                       Patient Instructions:      Ambulatory referral/consult to Physical/Occupational Therapy   Standing Status: Future   Referral Priority: Routine Referral Type: Physical Medicine   Referral Reason: Specialty Services Required   Number of Visits Requested: 1     Diet diabetic     Notify your health care provider if you experience any of the following:  temperature >100.4     Notify your health care provider if you experience any of the following:   "persistent dizziness, light-headedness, or visual disturbances     Notify your health care provider if you experience any of the following:  increased confusion or weakness     Activity as tolerated       Significant Diagnostic Studies: N/A    Medications:  Reconciled Home Medications:      Medication List      CHANGE how you take these medications    atorvastatin 80 MG tablet  Commonly known as: LIPITOR  Take 1 tablet (80 mg total) by mouth every evening.  What changed:   · medication strength  · how much to take  · when to take this     insulin degludec 100 unit/mL (3 mL) insulin pen  Commonly known as: TRESIBA FLEXTOUCH U-100  ADMINISTER 40 UNITS UNDER THE SKIN EVERY EVENING  What changed:   · how much to take  · how to take this  · when to take this     losartan 100 MG tablet  Commonly known as: COZAAR  Take 1 tablet (100 mg total) by mouth once daily.  What changed:   · medication strength  · how much to take        CONTINUE taking these medications    amLODIPine 10 MG tablet  Commonly known as: NORVASC  Take 1 tablet (10 mg total) by mouth once daily.     aspirin 81 MG Chew  Take 1 tablet (81 mg total) by mouth once daily.     cloNIDine 0.1 mg/24 hr td ptwk 0.1 mg/24 hr  Commonly known as: CATAPRES  Place 1 patch onto the skin every 7 days.     hydrALAZINE 100 MG tablet  Commonly known as: APRESOLINE  Take 1 tablet (100 mg total) by mouth 3 (three) times daily.     insulin aspart U-100 100 unit/mL injection  Commonly known as: NovoLOG  Inject into the skin 3 (three) times daily before meals.     metoprolol succinate 100 MG 24 hr tablet  Commonly known as: TOPROL-XL  Take 1 tablet (100 mg total) by mouth once daily.     OZURDEX 0.7 mg Impl intravitreal implant  Generic drug: dexAMETHasone  0.7 mg by Intravitreal route once.     pen needle, diabetic 31 gauge x 5/16" Ndle  Commonly known as: BD ULTRA-FINE SHORT PEN NEEDLE  1 pen by Misc.(Non-Drug; Combo Route) route 4 (four) times daily.     tiotropium 18 mcg " inhalation capsule  Commonly known as: SPIRIVA  Inhale 1 capsule (18 mcg total) into the lungs once daily. Controller        STOP taking these medications    clopidogreL 75 mg tablet  Commonly known as: PLAVIX            Indwelling Lines/Drains at time of discharge:   Lines/Drains/Airways     None                 Time spent on the discharge of patient: 40 minutes         Vilma Rodriguez MD  Department of Hospital Medicine  [facility]

## 2023-05-05 NOTE — PT/OT/SLP PROGRESS
Physical Therapy Treatment    Patient Name:  Steff Johnson   MRN:  [id number]    Recommendations:     Discharge Recommendations: outpatient PT  Discharge Equipment Recommendations: none  Barriers to discharge:  balance deficits; requires assistive device and assist x1 for safe ambulation    Assessment:     Steff Johnson is a 63 y.o. female admitted with a medical diagnosis of Hypertensive emergency.  She presents with the following impairments/functional limitations: weakness, impaired endurance, impaired self care skills, impaired functional mobility, gait instability, impaired balance, visual deficits, impaired cognition, decreased upper extremity function, decreased lower extremity function, decreased safety awareness, impaired coordination, impaired cardiopulmonary response to activity.    HOB elevated: /84 (120 MAP) and HR 65 bpm, with nurse clearing pt for participation    Supine>sit>stand transfers, CGA. Required Delores for safe ambulation 12' x 2 with RW due to balance and hemiparetic deficits. Remained up in chair with chair alarm, needs at hand, and calling family member for ride home post discharge.     Rehab Prognosis: Good; patient would benefit from acute skilled PT services to address these deficits and reach maximum level of function.    Recent Surgery: Procedure(s) (LRB):  Transesophageal echo (GT) intra-procedure log documentation (N/A) 1 Day Post-Op    Plan:     During this hospitalization, patient to be seen 6 x/week to address the identified rehab impairments via gait training, therapeutic activities, therapeutic exercises and progress toward the following goals:    Plan of Care Expires:       Subjective     Chief Complaint: none  Patient/Family Comments/goals: eager to discharge today  Pain/Comfort:  Pain Rating 1: 0/10  Pain Rating Post-Intervention 1: 0/10      Objective:     Communicated with nurse prior to session.  Patient found HOB elevated with telemetry, bed alarm upon PT entry to  room.     General Precautions: Standard, fall, aspiration  Orthopedic Precautions:    Braces: N/A  Respiratory Status: Room air     Functional Mobility:  Bed Mobility:     Supine to Sit: contact guard assistance  Transfers:     Sit to Stand:  contact guard assistance with rolling walker  Bed to Chair: minimum assistance with  rolling walker  using  Step Transfer  Gait: 12' x 2 with RW, Delores for balance and RW management on turn      AM-PAC 6 CLICK MOBILITY          Treatment & Education:  -Pt educ: transfer technique, fall prevention, call light, chair alarm    Patient left up in chair with all lines intact, call button in reach, chair alarm on, and nurse notified..    GOALS:   Multidisciplinary Problems       Physical Therapy Goals          Problem: Physical Therapy    Goal Priority Disciplines Outcome Goal Variances Interventions   Physical Therapy Goal     PT, PT/OT      Description: All PT goals to be met by discharge:    1. Supine to sit independent  2. Sit to stand transfer with Stand-by Assistance  3.. Bed to chair transfer with Supervision using Rolling Walker  4. Gait  x 100 feet with Minimal Assistance using Rolling Walker.                          Time Tracking:     PT Received On: 05/05/23  PT Start Time: 1051     PT Stop Time: 1104  PT Total Time (min): 13 min     Billable Minutes: Therapeutic Activity 13    Treatment Type: Treatment  PT/PTA: PTA     Number of PTA visits since last PT visit: 1     05/05/2023

## 2023-05-06 ENCOUNTER — NURSE TRIAGE (OUTPATIENT)
Dept: ADMINISTRATIVE | Facility: CLINIC | Age: 63
End: 2023-05-06
Payer: MEDICARE

## 2023-05-07 NOTE — TELEPHONE ENCOUNTER
Pt's friend calls on behalf of pt. He asks how her 40 units of insulin are to be given. He states that her sugar is currently 165 and that she is asymptomatic. NT goes to pt's eMAR and reads the following prescription instructions to pt's friend:    insulin degludec (TRESIBA FLEXTOUCH U-100) 100 unit/mL (3 mL) insulin pen 12 pen 1 2/16/2023  No   Sig: ADMINISTER 40 UNITS UNDER THE SKIN EVERY EVENING   Patient taking differently: Inject 40 Units into the skin every evening. ADMINISTER 40 UNITS UNDER THE SKIN EVERY EVENING        Sent to pharmacy as: insulin degludec (TRESIBA FLEXTOUCH U-100) 100 unit/mL (3 mL) insulin pen   Class: Normal   Order: [id number]   Date/Time Signed: 2/16/2023 15:37       E-Prescribing Status: Receipt confirmed by pharmacy (2/16/2023  3:37 PM CST)     Pt's friend verbalizes understanding. He declines triage at this time. He is instructed to call back if pt develops any new/worsening sxs, or if they have any additional questions or concerns. He verbalizes understanding.     Reason for Disposition   Health Information question, no triage required and triager able to answer question    Protocols used: Information Only Call - No Triage-A-

## 2023-05-08 ENCOUNTER — NURSE TRIAGE (OUTPATIENT)
Dept: ADMINISTRATIVE | Facility: CLINIC | Age: 63
End: 2023-05-08
Payer: MEDICARE

## 2023-05-08 NOTE — TELEPHONE ENCOUNTER
Pt's brother calls back after receiving a missed call from [facility] RAMBO Eubanks LPN.  Pt's Brother is transferred to [facility] for further assistance.     Reason for Disposition   Nursing judgment    Protocols used: Information Only Call - No Triage-A-[place]

## 2023-05-08 NOTE — TELEPHONE ENCOUNTER
Spoke with brother He states patient is feeling good.   He was verify her Insulin dosage.   Denies any problems   Reminded of follow up appointment on 5/12/2023 with Dr Ren.

## 2023-05-11 ENCOUNTER — PATIENT MESSAGE (OUTPATIENT)
Dept: ADMINISTRATIVE | Facility: OTHER | Age: 63
End: 2023-05-11
Payer: MEDICARE

## 2023-05-11 ENCOUNTER — OUTPATIENT CASE MANAGEMENT (OUTPATIENT)
Dept: ADMINISTRATIVE | Facility: OTHER | Age: 63
End: 2023-05-11
Payer: MEDICARE

## 2023-05-11 NOTE — LETTER
Steff Johnson  [address] [facility] [address]      Dear Ms. Johnson,     I work with [facility]'s Outpatient Care Management Department. We received a referral to call you to discuss your medical history. These services are free of charge and are offered to [facility] patients who have recently been discharged from any of our facilities or who have medical conditions that may require the skill of a nurse to assist with management.     I am a Registered Nurse who specializes in connecting patients with available medical and financial resources as well as addressing any educational needs that may be indicated.    I attempted to reach you by telephone, but I was unsuccessful. Please call our department so that we can go over some questions with you, regarding your health.    The Outpatient Care Management Department can be reached at [phone], from 8:00AM to 4:30 PM, on Monday thru Friday.     Additionally, [facility] also has a program where a nurse is available 24/7 to answer questions or provide medical advice, their number is [phone].      Thanks,        Oanh Serra RN  Outpatient Care Management  Phone [phone]

## 2023-05-12 ENCOUNTER — OFFICE VISIT (OUTPATIENT)
Dept: FAMILY MEDICINE | Facility: CLINIC | Age: 63
End: 2023-05-12
Payer: MEDICARE

## 2023-05-12 VITALS
HEIGHT: 59 IN | SYSTOLIC BLOOD PRESSURE: 120 MMHG | DIASTOLIC BLOOD PRESSURE: 60 MMHG | OXYGEN SATURATION: 97 % | HEART RATE: 73 BPM | TEMPERATURE: 98 F | RESPIRATION RATE: 14 BRPM | BODY MASS INDEX: 27.7 KG/M2

## 2023-05-12 DIAGNOSIS — E11.21 TYPE 2 DIABETES MELLITUS WITH DIABETIC NEPHROPATHY, WITH LONG-TERM CURRENT USE OF INSULIN: ICD-10-CM

## 2023-05-12 DIAGNOSIS — E11.59 HYPERTENSION ASSOCIATED WITH DIABETES: ICD-10-CM

## 2023-05-12 DIAGNOSIS — J44.9 CHRONIC OBSTRUCTIVE PULMONARY DISEASE, UNSPECIFIED COPD TYPE: ICD-10-CM

## 2023-05-12 DIAGNOSIS — Z79.4 TYPE 2 DIABETES MELLITUS WITH DIABETIC NEPHROPATHY, WITH LONG-TERM CURRENT USE OF INSULIN: ICD-10-CM

## 2023-05-12 DIAGNOSIS — I15.2 HYPERTENSION ASSOCIATED WITH DIABETES: ICD-10-CM

## 2023-05-12 DIAGNOSIS — N18.30 STAGE 3 CHRONIC KIDNEY DISEASE, UNSPECIFIED WHETHER STAGE 3A OR 3B CKD: ICD-10-CM

## 2023-05-12 DIAGNOSIS — I63.9 ISCHEMIC STROKE: Primary | ICD-10-CM

## 2023-05-12 DIAGNOSIS — E78.2 MIXED HYPERLIPIDEMIA: ICD-10-CM

## 2023-05-12 PROBLEM — I10 HYPERTENSION: Status: RESOLVED | Noted: 2021-03-30 | Resolved: 2023-05-12

## 2023-05-12 PROCEDURE — 4010F PR ACE/ARB THEARPY RXD/TAKEN: ICD-10-PCS | Mod: CPTII,S$GLB,, | Performed by: FAMILY MEDICINE

## 2023-05-12 PROCEDURE — 3074F SYST BP LT 130 MM HG: CPT | Mod: CPTII,S$GLB,, | Performed by: FAMILY MEDICINE

## 2023-05-12 PROCEDURE — 99214 OFFICE O/P EST MOD 30 MIN: CPT | Mod: S$GLB,,, | Performed by: FAMILY MEDICINE

## 2023-05-12 PROCEDURE — 1160F RVW MEDS BY RX/DR IN RCRD: CPT | Mod: CPTII,S$GLB,, | Performed by: FAMILY MEDICINE

## 2023-05-12 PROCEDURE — 1101F PR PT FALLS ASSESS DOC 0-1 FALLS W/OUT INJ PAST YR: ICD-10-PCS | Mod: CPTII,S$GLB,, | Performed by: FAMILY MEDICINE

## 2023-05-12 PROCEDURE — 99214 PR OFFICE/OUTPT VISIT, EST, LEVL IV, 30-39 MIN: ICD-10-PCS | Mod: S$GLB,,, | Performed by: FAMILY MEDICINE

## 2023-05-12 PROCEDURE — 1159F MED LIST DOCD IN RCRD: CPT | Mod: CPTII,S$GLB,, | Performed by: FAMILY MEDICINE

## 2023-05-12 PROCEDURE — 3078F DIAST BP <80 MM HG: CPT | Mod: CPTII,S$GLB,, | Performed by: FAMILY MEDICINE

## 2023-05-12 PROCEDURE — 3060F POS MICROALBUMINURIA REV: CPT | Mod: CPTII,S$GLB,, | Performed by: FAMILY MEDICINE

## 2023-05-12 PROCEDURE — 3008F PR BODY MASS INDEX (BMI) DOCUMENTED: ICD-10-PCS | Mod: CPTII,S$GLB,, | Performed by: FAMILY MEDICINE

## 2023-05-12 PROCEDURE — 1111F PR DISCHARGE MEDS RECONCILED W/ CURRENT OUTPATIENT MED LIST: ICD-10-PCS | Mod: CPTII,S$GLB,, | Performed by: FAMILY MEDICINE

## 2023-05-12 PROCEDURE — 3288F PR FALLS RISK ASSESSMENT DOCUMENTED: ICD-10-PCS | Mod: CPTII,S$GLB,, | Performed by: FAMILY MEDICINE

## 2023-05-12 PROCEDURE — 99999 PR PBB SHADOW E&M-EST. PATIENT-LVL IV: CPT | Mod: PBBFAC,,, | Performed by: FAMILY MEDICINE

## 2023-05-12 PROCEDURE — 3008F BODY MASS INDEX DOCD: CPT | Mod: CPTII,S$GLB,, | Performed by: FAMILY MEDICINE

## 2023-05-12 PROCEDURE — 3046F PR MOST RECENT HEMOGLOBIN A1C LEVEL > 9.0%: ICD-10-PCS | Mod: CPTII,S$GLB,, | Performed by: FAMILY MEDICINE

## 2023-05-12 PROCEDURE — 1160F PR REVIEW ALL MEDS BY PRESCRIBER/CLIN PHARMACIST DOCUMENTED: ICD-10-PCS | Mod: CPTII,S$GLB,, | Performed by: FAMILY MEDICINE

## 2023-05-12 PROCEDURE — 3066F NEPHROPATHY DOC TX: CPT | Mod: CPTII,S$GLB,, | Performed by: FAMILY MEDICINE

## 2023-05-12 PROCEDURE — 3074F PR MOST RECENT SYSTOLIC BLOOD PRESSURE < 130 MM HG: ICD-10-PCS | Mod: CPTII,S$GLB,, | Performed by: FAMILY MEDICINE

## 2023-05-12 PROCEDURE — 3060F PR POS MICROALBUMINURIA RESULT DOCUMENTED/REVIEW: ICD-10-PCS | Mod: CPTII,S$GLB,, | Performed by: FAMILY MEDICINE

## 2023-05-12 PROCEDURE — 4010F ACE/ARB THERAPY RXD/TAKEN: CPT | Mod: CPTII,S$GLB,, | Performed by: FAMILY MEDICINE

## 2023-05-12 PROCEDURE — 1101F PT FALLS ASSESS-DOCD LE1/YR: CPT | Mod: CPTII,S$GLB,, | Performed by: FAMILY MEDICINE

## 2023-05-12 PROCEDURE — 3046F HEMOGLOBIN A1C LEVEL >9.0%: CPT | Mod: CPTII,S$GLB,, | Performed by: FAMILY MEDICINE

## 2023-05-12 PROCEDURE — 3078F PR MOST RECENT DIASTOLIC BLOOD PRESSURE < 80 MM HG: ICD-10-PCS | Mod: CPTII,S$GLB,, | Performed by: FAMILY MEDICINE

## 2023-05-12 PROCEDURE — 3066F PR DOCUMENTATION OF TREATMENT FOR NEPHROPATHY: ICD-10-PCS | Mod: CPTII,S$GLB,, | Performed by: FAMILY MEDICINE

## 2023-05-12 PROCEDURE — 99999 PR PBB SHADOW E&M-EST. PATIENT-LVL IV: ICD-10-PCS | Mod: PBBFAC,,, | Performed by: FAMILY MEDICINE

## 2023-05-12 PROCEDURE — 1111F DSCHRG MED/CURRENT MED MERGE: CPT | Mod: CPTII,S$GLB,, | Performed by: FAMILY MEDICINE

## 2023-05-12 PROCEDURE — 3288F FALL RISK ASSESSMENT DOCD: CPT | Mod: CPTII,S$GLB,, | Performed by: FAMILY MEDICINE

## 2023-05-12 PROCEDURE — 1159F PR MEDICATION LIST DOCUMENTED IN MEDICAL RECORD: ICD-10-PCS | Mod: CPTII,S$GLB,, | Performed by: FAMILY MEDICINE

## 2023-05-12 RX ORDER — INSULIN ASPART 100 [IU]/ML
12 INJECTION, SOLUTION INTRAVENOUS; SUBCUTANEOUS
Qty: 10.8 ML | Refills: 11 | Status: ON HOLD | OUTPATIENT
Start: 2023-05-12 | End: 2023-07-06 | Stop reason: HOSPADM

## 2023-05-12 RX ORDER — CHLORTHALIDONE 50 MG/1
50 TABLET ORAL DAILY
Qty: 90 TABLET | Refills: 3 | Status: ON HOLD | OUTPATIENT
Start: 2023-05-12 | End: 2023-06-16 | Stop reason: HOSPADM

## 2023-05-12 RX ORDER — INSULIN DEGLUDEC 100 U/ML
INJECTION, SOLUTION SUBCUTANEOUS
Qty: 12 PEN | Refills: 1 | Status: SHIPPED | OUTPATIENT
Start: 2023-05-12 | End: 2023-06-27 | Stop reason: SDUPTHER

## 2023-05-12 NOTE — PROGRESS NOTES
Subjective:       Patient ID: Steff Johnson is a 63 y.o. female.    Chief Complaint: Transitional Care    HPI  Review of Systems   Constitutional:  Negative for fatigue and unexpected weight change.   Respiratory:  Negative for chest tightness and shortness of breath.    Cardiovascular:  Negative for chest pain, palpitations and leg swelling.   Gastrointestinal:  Negative for abdominal pain.   Musculoskeletal:  Negative for arthralgias.   Neurological:  Negative for dizziness, syncope, light-headedness and headaches.     Patient Active Problem List   Diagnosis    Hypertension associated with diabetes    COPD (chronic obstructive pulmonary disease)    Hyperglycemia due to type 2 diabetes mellitus    Proteinuria    Complete tear of left rotator cuff    Left shoulder pain    Shoulder stiffness, left    Shoulder weakness    Type 2 diabetes mellitus with diabetic nephropathy, HbA1c 8.9%    Mixed hyperlipidemia    Type 2 diabetes mellitus with both eyes affected by moderate nonproliferative retinopathy and macular edema, with long-term current use of insulin    H/o Cerebrovascular accident (CVA) of left pontine structure    H/o Cranial nerve III palsy, left    Gait abnormality    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   Admitted 5/5/23 [facility] noted to have left facial droop with right upper extremity weakness, slurred speech with word-finding difficulties, reported initial episode started yesterday but somewhat improved, reoccurred around lunchtime (12:30 pm) today which prompted ED presentation.  Patient denies headache,  swallowing difficulties, does reports vision abnormalities but unclear if new or chronic, does report weakness right upper extremity, she lives with her father at home who assist her, reports did take all medications this morning.CT head no acute hemorrhage, CTA no large vessel occlusion.  She was evaluated by tele stroke, Dr Goodman, out of window for tPA, MRI brain without contrast, continuing dual antiplatelet therapy, allowing for permissive hypertension up to 210/120. Hospital Course:   63-year-old lady who is in and out of hospital who was just discharged from our service 5 days ago came with multiple neurological symptoms found to have tiny infarct in mid left temporal lobe.  She was evaluated by Neurology who recommended GT.  GT did not show any intracardiac shunting.  She appears to have very resistant hypertension however she was not taking her antihypertensives as prescribed.      Admitted 4/28/23 [facility] due to loss of vision, blurry since that AM. Admitted for HTN crisis and CVA . Neuro consulted. Patient underwent MRI that confirmed punctate stroke in the right midbrain and another punctate stroke in the left cerebellum. Blood pressure controlled with nicardipine GTT that was later changed to PO medications. Blood pressure have improved. Clinically patient improving. PT and OT recommended home health that has been arranged. Patient will be discharged home in stable condition.     Since being home she has persistent mild right sided weakness and left facial droop.  Has already signed up for outpatient PT and ST.  Here with significant other Nish cespedes.He organizes and gives her her medications.  Denies missing any medications but when asked about clonidine patch which is placed once a week he says it wasn't put on consistently but he said the sx of stroke may have started when he placed one.  I asked if she was wearing one today and he said he put it on but it came off last night.      Transitional Care  Note    Family and/or Caretaker present at visit?  Yes.  Diagnostic tests reviewed/disposition: No diagnosic tests pending after this hospitalization.  Disease/illness education: yes  Home health/community services discussion/referrals: Patient does not have home health established from hospital visit.  They do not need home health.  If needed, we will set up home health for the patient.   Establishment or re-establishment of referral orders for community resources: No other necessary community resources.   Discussion with other health care providers: No discussion with other health care providers necessary.     Patient has a recent hospitalization which is summarized above.  Communication (direct contact by telephone or electronic mail) with the patient and/or caregiver was documented within 2 business days of discharge.    Medical decision making was based on a face-to-face visit scheduled within the indicated time frame.  Medication reconciliation and management was completed at this visit.    History:  Dexa 1/2023 osteopenia      Wound Dr. Crespo following for right lower leg chronic venous stasis ulcer     Presents today  for check up. Significant other whom she lives with Nish Marti sometimes comes with her. He is also unfamiliar with her meds and dosing but willing to help assist.  [facility] signed 2/2022.   Was referred from pain mgmt to ortho Dr. Del Angel who wanted to give her a shot.        Admitted 5/2022 [facility] for right sided weakness and blurry vision. Catapres patch had fallen off few days ago. Had htn urgency 162-245 systolic.   CTA head was negative. CTA head and neck showed no clinically significant stenosis, large vessel occlusion or aneurysm.       Neuro Dr. Magana CVA -last appt 2/2022        Admitted [facility] 1/5/2022 for acute CVA -right sided weakness and slurred speech. Head imaging- New punctate hyperattenuating focus in the right basal ganglia suspicious for a small petechial bleed.        Admitted  11/23/2021 [facility] for 1 day finger pain after fall. She is hypertensive 233/132 she was treated with hydralazine IV with improvement in her blood pressure. Found to have finger tip infection s/p I & D. Admitted for IV abx     Admitted 7/4/2021 for fall, difficulty with balance. She had come to emergency room couple days before and ER physician wanted to admit here however she decided to go home against medical advice.  She also admits that she is not taking her medications as prescribed and previous notes mentioned that she has a poor compliance with her antihypertensive regimen as well as insulin regimen.  In ED patient was found to have very elevated blood pressure, elevated troponin, and she was incidentally found to have COVID-19 infection.  Decision was made to admit her for right-sided weakness, frequent falls, elevated troponin. Hospital Course: She underwent extensive neurocardiac workup which showed micro hemorrhages.  She was cleared for discharge by Neurology on aspirin and statin.  It appears that patient has very poorly controlled hypertension, already has residual right-sided weakness, poor vision     Admitted 3/31/2021 [facility] after running out of medsd in HTN urgency     Admitted [facility] 12/12/19 for left pontine lacunar infarct basilar artery verified by MRI brain. Sx of being off balance and eye problem. Echo normal.  Started on asa and plavix x 21 d then asa.  Sent home with HH and PT/OT/ST. [facility].  Still having double vision and balance issues. Never got  Walker.      Endocrine Dr. Blanco A1c 10.2-poorly controlled DM due to NC , vit d def.  Lipids at goal. taking tresiba 40 u at night and novolog 14u tid + correction per SS u with meals tid.  checking BS qid. Next appt 6/27/23     Ophth Dr. Rousseau/now Bernard/Arnol Complications from DM-prolif retinopathy and nephropathy, ocular htn. Has established Dr. Fiorella Echols/Mary CKD stage 3, malignant HTN , low vit D.       Pain  mgmt Dr. Costello treating chronic shoulder pain norco 10 tid. Dps checked no evidence of diversion     Objective:      Physical Exam  Vitals and nursing note reviewed.   Constitutional:       Appearance: She is well-developed.   Cardiovascular:      Rate and Rhythm: Normal rate and regular rhythm.      Heart sounds: Normal heart sounds.   Pulmonary:      Effort: Pulmonary effort is normal.      Breath sounds: Normal breath sounds.   Skin:     General: Skin is warm and dry.   Neurological:      Mental Status: She is alert and oriented to person, place, and time.       Assessment:       1. Acute ischemic stroke     2. Chronic obstructive pulmonary disease, unspecified COPD type    3. Hypertension associated with diabetes    4. Mixed hyperlipidemia    5. Stage 3 chronic kidney disease, unspecified whether stage 3a or 3b CKD    6. Type 2 diabetes mellitus with diabetic nephropathy, with long-term current use of insulin        Plan:       1. Acute ischemic stroke   Cont asa.  Outpatient pt and ST pending    2. Chronic obstructive pulmonary disease, unspecified COPD type  Cont current mgmt    3. Hypertension associated with diabetes  Poor control due to inconsistent medication administration. Recommended avoidance of clonidine patch since use is possibly associated with onset of CVA.  Continue current meds including hydralazine 100mg tid, norvasc 10mg, losartan 100mg , toprol XL 100mg and add  - chlorthalidone (HYGROTEN) 50 MG Tab; Take 1 tablet (50 mg total) by mouth once daily.  Dispense: 90 tablet; Refill: 3    4. Mixed hyperlipidemia  Stable condition.  Continue current medications.  Will adjust based on lab findings or if condition changes.      5. Stage 3 chronic kidney disease, unspecified whether stage 3a or 3b CKD  Stable and chronic.  Will continue to monitor q3-6 months and control chronic conditions as optimally as possible to preserve function.      6. Type 2 diabetes mellitus with diabetic nephropathy, with  long-term current use of insulin  Stable condition.  Continue current medications.  Will adjust based on lab findings or if condition changes.  F/u endocrine  - insulin degludec (TRESIBA FLEXTOUCH U-100) 100 unit/mL (3 mL) insulin pen; ADMINISTER 40 UNITS UNDER THE SKIN EVERY EVENING  Dispense: 12 pen; Refill: 1  - insulin aspart U-100 (NOVOLOG) 100 unit/mL injection; Inject 12 Units into the skin 3 (three) times daily before meals.  Dispense: 10.8 mL; Refill: 11        Time spent with patient: 20 minutes    Patient with be reevaluated in 3 months or sooner prn    Greater than 50% of this visit was spent counseling as described in above documentation:Yes

## 2023-05-15 ENCOUNTER — NURSE TRIAGE (OUTPATIENT)
Dept: ADMINISTRATIVE | Facility: CLINIC | Age: 63
End: 2023-05-15
Payer: MEDICARE

## 2023-05-15 DIAGNOSIS — R26.9 GAIT ABNORMALITY: Primary | ICD-10-CM

## 2023-05-15 DIAGNOSIS — I63.9 ISCHEMIC STROKE: ICD-10-CM

## 2023-05-15 DIAGNOSIS — R47.1 DYSARTHRIA: ICD-10-CM

## 2023-05-15 NOTE — TELEPHONE ENCOUNTER
Spoke with  Nish who was calling in for update regarding Home health referral. States he can be contacted at [phone]  Reason for Disposition   Information only question and nurse able to answer    Protocols used: Information Only Call - No Triage-A-OH

## 2023-05-15 NOTE — TELEPHONE ENCOUNTER
Called [facility]   They are unable to take her through Home Health.    The patient's dog bit the nurse.   She would need a different referral.   Please advise

## 2023-05-17 LAB
OHS CV EVENT MONITOR DAY: 2
OHS CV HOLTER LENGTH DECIMAL HOURS: 96
OHS CV HOLTER LENGTH HOURS: 48
OHS CV HOLTER LENGTH MINUTES: 0
OHS CV HOLTER SINUS AVERAGE HR: 62
OHS CV HOLTER SINUS MAX HR: 102
OHS CV HOLTER SINUS MIN HR: 50

## 2023-05-18 ENCOUNTER — OFFICE VISIT (OUTPATIENT)
Dept: FAMILY MEDICINE | Facility: CLINIC | Age: 63
End: 2023-05-18
Payer: MEDICARE

## 2023-05-18 VITALS
DIASTOLIC BLOOD PRESSURE: 60 MMHG | WEIGHT: 141.56 LBS | BODY MASS INDEX: 28.54 KG/M2 | HEIGHT: 59 IN | OXYGEN SATURATION: 95 % | SYSTOLIC BLOOD PRESSURE: 130 MMHG | TEMPERATURE: 99 F | HEART RATE: 76 BPM

## 2023-05-18 DIAGNOSIS — E11.21 TYPE 2 DIABETES MELLITUS WITH DIABETIC NEPHROPATHY, WITH LONG-TERM CURRENT USE OF INSULIN: ICD-10-CM

## 2023-05-18 DIAGNOSIS — E66.3 OVERWEIGHT (BMI 25.0-29.9): ICD-10-CM

## 2023-05-18 DIAGNOSIS — I77.1 TORTUOUS AORTA: ICD-10-CM

## 2023-05-18 DIAGNOSIS — I63.9 ISCHEMIC STROKE: ICD-10-CM

## 2023-05-18 DIAGNOSIS — E78.5 HYPERLIPIDEMIA ASSOCIATED WITH TYPE 2 DIABETES MELLITUS: ICD-10-CM

## 2023-05-18 DIAGNOSIS — E11.69 HYPERLIPIDEMIA ASSOCIATED WITH TYPE 2 DIABETES MELLITUS: ICD-10-CM

## 2023-05-18 DIAGNOSIS — I15.2 HYPERTENSION ASSOCIATED WITH DIABETES: ICD-10-CM

## 2023-05-18 DIAGNOSIS — J44.9 CHRONIC OBSTRUCTIVE PULMONARY DISEASE, UNSPECIFIED COPD TYPE: ICD-10-CM

## 2023-05-18 DIAGNOSIS — E11.65 TYPE 2 DIABETES MELLITUS WITH HYPERGLYCEMIA, WITHOUT LONG-TERM CURRENT USE OF INSULIN: Primary | ICD-10-CM

## 2023-05-18 DIAGNOSIS — N18.30 STAGE 3 CHRONIC KIDNEY DISEASE, UNSPECIFIED WHETHER STAGE 3A OR 3B CKD: ICD-10-CM

## 2023-05-18 DIAGNOSIS — E11.59 HYPERTENSION ASSOCIATED WITH DIABETES: ICD-10-CM

## 2023-05-18 DIAGNOSIS — Z79.4 TYPE 2 DIABETES MELLITUS WITH DIABETIC NEPHROPATHY, WITH LONG-TERM CURRENT USE OF INSULIN: ICD-10-CM

## 2023-05-18 PROCEDURE — 4010F ACE/ARB THERAPY RXD/TAKEN: CPT | Mod: CPTII,S$GLB,, | Performed by: NURSE PRACTITIONER

## 2023-05-18 PROCEDURE — 1101F PT FALLS ASSESS-DOCD LE1/YR: CPT | Mod: CPTII,S$GLB,, | Performed by: NURSE PRACTITIONER

## 2023-05-18 PROCEDURE — 4010F PR ACE/ARB THEARPY RXD/TAKEN: ICD-10-PCS | Mod: CPTII,S$GLB,, | Performed by: NURSE PRACTITIONER

## 2023-05-18 PROCEDURE — 3046F HEMOGLOBIN A1C LEVEL >9.0%: CPT | Mod: CPTII,S$GLB,, | Performed by: NURSE PRACTITIONER

## 2023-05-18 PROCEDURE — 3075F SYST BP GE 130 - 139MM HG: CPT | Mod: CPTII,S$GLB,, | Performed by: NURSE PRACTITIONER

## 2023-05-18 PROCEDURE — 1159F MED LIST DOCD IN RCRD: CPT | Mod: CPTII,S$GLB,, | Performed by: NURSE PRACTITIONER

## 2023-05-18 PROCEDURE — 3008F PR BODY MASS INDEX (BMI) DOCUMENTED: ICD-10-PCS | Mod: CPTII,S$GLB,, | Performed by: NURSE PRACTITIONER

## 2023-05-18 PROCEDURE — 99214 PR OFFICE/OUTPT VISIT, EST, LEVL IV, 30-39 MIN: ICD-10-PCS | Mod: S$GLB,,, | Performed by: NURSE PRACTITIONER

## 2023-05-18 PROCEDURE — 1160F PR REVIEW ALL MEDS BY PRESCRIBER/CLIN PHARMACIST DOCUMENTED: ICD-10-PCS | Mod: CPTII,S$GLB,, | Performed by: NURSE PRACTITIONER

## 2023-05-18 PROCEDURE — 1101F PR PT FALLS ASSESS DOC 0-1 FALLS W/OUT INJ PAST YR: ICD-10-PCS | Mod: CPTII,S$GLB,, | Performed by: NURSE PRACTITIONER

## 2023-05-18 PROCEDURE — 99214 OFFICE O/P EST MOD 30 MIN: CPT | Mod: S$GLB,,, | Performed by: NURSE PRACTITIONER

## 2023-05-18 PROCEDURE — 1111F PR DISCHARGE MEDS RECONCILED W/ CURRENT OUTPATIENT MED LIST: ICD-10-PCS | Mod: CPTII,S$GLB,, | Performed by: NURSE PRACTITIONER

## 2023-05-18 PROCEDURE — 1160F RVW MEDS BY RX/DR IN RCRD: CPT | Mod: CPTII,S$GLB,, | Performed by: NURSE PRACTITIONER

## 2023-05-18 PROCEDURE — 3078F PR MOST RECENT DIASTOLIC BLOOD PRESSURE < 80 MM HG: ICD-10-PCS | Mod: CPTII,S$GLB,, | Performed by: NURSE PRACTITIONER

## 2023-05-18 PROCEDURE — 3060F POS MICROALBUMINURIA REV: CPT | Mod: CPTII,S$GLB,, | Performed by: NURSE PRACTITIONER

## 2023-05-18 PROCEDURE — 3066F PR DOCUMENTATION OF TREATMENT FOR NEPHROPATHY: ICD-10-PCS | Mod: CPTII,S$GLB,, | Performed by: NURSE PRACTITIONER

## 2023-05-18 PROCEDURE — 3046F PR MOST RECENT HEMOGLOBIN A1C LEVEL > 9.0%: ICD-10-PCS | Mod: CPTII,S$GLB,, | Performed by: NURSE PRACTITIONER

## 2023-05-18 PROCEDURE — 3288F PR FALLS RISK ASSESSMENT DOCUMENTED: ICD-10-PCS | Mod: CPTII,S$GLB,, | Performed by: NURSE PRACTITIONER

## 2023-05-18 PROCEDURE — 3066F NEPHROPATHY DOC TX: CPT | Mod: CPTII,S$GLB,, | Performed by: NURSE PRACTITIONER

## 2023-05-18 PROCEDURE — 3288F FALL RISK ASSESSMENT DOCD: CPT | Mod: CPTII,S$GLB,, | Performed by: NURSE PRACTITIONER

## 2023-05-18 PROCEDURE — 3075F PR MOST RECENT SYSTOLIC BLOOD PRESS GE 130-139MM HG: ICD-10-PCS | Mod: CPTII,S$GLB,, | Performed by: NURSE PRACTITIONER

## 2023-05-18 PROCEDURE — 3060F PR POS MICROALBUMINURIA RESULT DOCUMENTED/REVIEW: ICD-10-PCS | Mod: CPTII,S$GLB,, | Performed by: NURSE PRACTITIONER

## 2023-05-18 PROCEDURE — 1159F PR MEDICATION LIST DOCUMENTED IN MEDICAL RECORD: ICD-10-PCS | Mod: CPTII,S$GLB,, | Performed by: NURSE PRACTITIONER

## 2023-05-18 PROCEDURE — 3008F BODY MASS INDEX DOCD: CPT | Mod: CPTII,S$GLB,, | Performed by: NURSE PRACTITIONER

## 2023-05-18 PROCEDURE — 1111F DSCHRG MED/CURRENT MED MERGE: CPT | Mod: CPTII,S$GLB,, | Performed by: NURSE PRACTITIONER

## 2023-05-18 PROCEDURE — 3078F DIAST BP <80 MM HG: CPT | Mod: CPTII,S$GLB,, | Performed by: NURSE PRACTITIONER

## 2023-05-18 PROCEDURE — 99999 PR PBB SHADOW E&M-EST. PATIENT-LVL V: CPT | Mod: PBBFAC,,, | Performed by: NURSE PRACTITIONER

## 2023-05-18 PROCEDURE — 99999 PR PBB SHADOW E&M-EST. PATIENT-LVL V: ICD-10-PCS | Mod: PBBFAC,,, | Performed by: NURSE PRACTITIONER

## 2023-05-18 NOTE — PATIENT INSTRUCTIONS
Kishan Artis,     If you are due for any health screening(s) below please notify me so we can arrange them to be ordered and scheduled to maintain your health. Most healthy patients complete it. Don't lose out on improving your health.     Tests to Keep You Healthy    Mammogram: DUE  Eye Exam: SCHEDULED  Colon Cancer Screening: DUE  Last Blood Pressure <= 139/89 (5/18/2023): Yes  Last HbA1c < 8 (05/03/2023): NO      Breast Cancer Screening    Breast cancer is the second most common cancer in women after skin cancer, and the second leading cause of death from cancer after lung cancer. Mammograms can detect breast cancer early, which significantly increases the chances of curing the cancer.      A screening mammogram is an x-ray image of the breasts used for early breast cancer detection. It can help reduce the number of deaths from breast cancer among women. To get a clear image, the breast is placed between two plastic plates to make it flat. How often a mammogram is needed depends on your age and your breast cancer risk.    Colon Cancer Screening    Of cancers affecting both men and women, colorectal cancer is the third leading cancer killer in the [place]. But it doesnt have to be. Screening can prevent colorectal cancer or find it at an early stage when treatment often leads to a cure.    A colonoscopy is the preferred test for detecting colon cancer. It is needed only once every 10 years if results are negative. While sedated, a flexible, lighted tube with a tiny camera is inserted into the rectum and advanced through the colon to look for cancers. An alternative screening test that is used at home and returned to the lab may also be used. It detects hidden blood in bowel movements which could indicate cancer in the colon. If results are positive, you will need a colonoscopy to determine if the blood is a sign of cancer. This type of follow up (diagnostic) colonoscopy usually requires additional copays as  required by your insurance provider. Please contact your PCP if you have any questions.         Diabetic Retinal Eye Exam    Diabetes is the #1 cause of blindness in the [place] - early detection before signs or symptoms develop can prevent debilitating blindness.    Once-a-year screening is recommended for all diabetic patients. This exam can prevent and treat diabetes complications in the eye before developing symptoms. This can be done with a special camera is used to take photographs of the back of your eye without having to dilate them, or you can see an eye doctor for a full dilated exam.      A1c every 3-6 months, lipid panel every year, microalbumin (urine test) once per year, comprehensive foot exam once per year, dilated eye exam at least once per year, dental exam every 6 months, flu vaccination every year, and pneumonia vaccination(s) as recommended

## 2023-05-18 NOTE — PROGRESS NOTES
Subjective:       Patient ID: Steff Johnson is a 63 y.o. female.    Chief Complaint: hospital follow up    HPI    Patient presents today for chronic conditions follow up. Last visit PCP- 5/12/23. Home BS reading 150,80,167    5/2/23 hospital visit for acute ischemic stroke, hypertensive emergency-PO/OT-vision changes with slurred speech, treated for hypertensive emergency, MRI with acute right medial midbrain infarct, seen by Neurology, dual antiplatelet therapy, PT/OT recommended home health.  As per history, noted to have left facial droop with right upper extremity weakness, slurred speech with word-finding  GT did not show any intracardiac shunting.  She appears to have very resistant hypertension however she was not taking her antihypertensives as prescribed.  I enforced education on her antihypertensives again, [facility] to ascertain delivery before discharge.  We also arranged Holter monitor before discharge       Patient with recent [facility] admission, 4/26 to 4/28    Out patient PT/OT  Past Medical History:   Diagnosis Date    Arthritis     Complete tear of left rotator cuff 11/09/2017    COPD (chronic obstructive pulmonary disease)     COVID-19 infection 07/02/2021    Diabetes mellitus     H/o Cerebrovascular accident (CVA) of left pontine structure 12/12/2019    Hypertension     Personal history of colonic polyps     Stroke     Wears glasses        Review of patient's allergies indicates:  No Known Allergies      Current Outpatient Medications:     amLODIPine (NORVASC) 10 MG tablet, Take 1 tablet (10 mg total) by mouth once daily., Disp: 30 tablet, Rfl: 0    aspirin 81 MG Chew, Take 1 tablet (81 mg total) by mouth once daily., Disp: 30 tablet, Rfl: 0    atorvastatin (LIPITOR) 80 MG tablet, Take 1 tablet (80 mg total) by mouth every evening., Disp: 30 tablet, Rfl: 0    chlorthalidone (HYGROTEN) 50 MG Tab, Take 1 tablet (50 mg total) by mouth once daily., Disp:  "90 tablet, Rfl: 3    dexAMETHasone (OZURDEX) 0.7 mg Impl intravitreal implant, 0.7 mg by Intravitreal route once., Disp: , Rfl:     hydrALAZINE (APRESOLINE) 100 MG tablet, Take 1 tablet (100 mg total) by mouth 3 (three) times daily., Disp: 270 tablet, Rfl: 1    insulin aspart U-100 (NOVOLOG) 100 unit/mL injection, Inject 12 Units into the skin 3 (three) times daily before meals., Disp: 10.8 mL, Rfl: 11    insulin degludec (TRESIBA FLEXTOUCH U-100) 100 unit/mL (3 mL) insulin pen, ADMINISTER 40 UNITS UNDER THE SKIN EVERY EVENING, Disp: 12 pen, Rfl: 1    losartan (COZAAR) 100 MG tablet, Take 1 tablet (100 mg total) by mouth once daily., Disp: 30 tablet, Rfl: 0    pen needle, diabetic (BD ULTRA-FINE SHORT PEN NEEDLE) 31 gauge x 5/16" Ndle, 1 pen by Misc.(Non-Drug; Combo Route) route 4 (four) times daily., Disp: 300 each, Rfl: 5    tiotropium (SPIRIVA) 18 mcg inhalation capsule, Inhale 1 capsule (18 mcg total) into the lungs once daily. Controller, Disp: 90 capsule, Rfl: 3    metoprolol succinate (TOPROL-XL) 100 MG 24 hr tablet, Take 1 tablet (100 mg total) by mouth once daily., Disp: 90 tablet, Rfl: 1    Review of Systems   Constitutional:  Negative for unexpected weight change.   HENT:  Negative for trouble swallowing.    Eyes:  Negative for visual disturbance.   Respiratory:  Negative for shortness of breath.    Cardiovascular:  Negative for chest pain, palpitations and leg swelling.   Gastrointestinal:  Negative for blood in stool.   Genitourinary:  Negative for hematuria.   Skin:  Negative for rash.   Allergic/Immunologic: Negative for immunocompromised state.   Neurological:  Negative for headaches.   Hematological:  Does not bruise/bleed easily.   Psychiatric/Behavioral:  Negative for agitation. The patient is not nervous/anxious.      Objective:      /60 (BP Location: Left arm, Patient Position: Sitting, BP Method: Medium (Manual))   Pulse 76   Temp 98.6 °F (37 °C) (Oral)   Ht 4' 11" (1.499 m)   Wt 64.2 " kg (141 lb 8.6 oz)   SpO2 95%   BMI 28.59 kg/m²   Physical Exam  Constitutional:       Appearance: She is well-developed.   HENT:      Head: Normocephalic.   Cardiovascular:      Rate and Rhythm: Normal rate and regular rhythm.      Heart sounds: Normal heart sounds.   Pulmonary:      Effort: Pulmonary effort is normal.   Musculoskeletal:         General: Normal range of motion.   Skin:     General: Skin is warm and dry.   Neurological:      Mental Status: She is alert and oriented to person, place, and time.   Psychiatric:         Behavior: Behavior normal.         Thought Content: Thought content normal.         Judgment: Judgment normal.       Assessment:       1. Type 2 diabetes mellitus with hyperglycemia, without long-term current use of insulin    2. Hypertension associated with diabetes    3. Hyperlipidemia associated with type 2 diabetes mellitus    4. Type 2 diabetes mellitus with diabetic nephropathy, with long-term current use of insulin    5. Chronic obstructive pulmonary disease, unspecified COPD type    6. Stage 3 chronic kidney disease, unspecified whether stage 3a or 3b CKD    7. Ischemic stroke    8. Tortuous aorta    9. Overweight (BMI 25.0-29.9)        Plan:       Type 2 diabetes mellitus with hyperglycemia, without long-term current use of insulin  A1c not at goal, was addressed by PCP on 5/12/23 office visit   - insulin degludec (TRESIBA FLEXTOUCH U-100) 100 unit/mL (3 mL) insulin pen; ADMINISTER 40 UNITS UNDER THE SKIN EVERY EVENING  Dispense: 12 pen; Refill: 1  - insulin aspart U-100 (NOVOLOG) 100 unit/mL injection; Inject 12 Units into the skin 3 (three) times daily before meals.  Dispense: 10.8 mL; Refill  Follow the ADA diet  Hemoglobin A1C   Date Value Ref Range Status   05/03/2023 10.2 (H) 4.5 - 6.2 % Final     Comment:     According to ADA guidelines, hemoglobin A1C <7.0% represents  optimal control in non-pregnant diabetic patients.  Different  metrics may apply to specific  populations.   Standards of Medical Care in Diabetes - 2016.    For the purpose of screening for the presence of diabetes:  <5.7%     Consistent with the absence of diabetes  5.7-6.4%  Consistent with increasing risk for diabetes   (prediabetes)  >or=6.5%  Consistent with diabetes    Currently no consensus exists for use of hemoglobin A1C  for diagnosis of diabetes for children.     02/22/2023 8.9 (H) 4.0 - 5.6 % Final     Comment:     ADA Screening Guidelines:  5.7-6.4%  Consistent with prediabetes  >or=6.5%  Consistent with diabetes    High levels of fetal hemoglobin interfere with the HbA1C  assay. Heterozygous hemoglobin variants (HbS, HgC, etc)do  not significantly interfere with this assay.   However, presence of multiple variants may affect accuracy.     12/10/2022 8.6 (H) 4.0 - 5.6 % Final     Comment:     ADA Screening Guidelines:  5.7-6.4%  Consistent with prediabetes  >or=6.5%  Consistent with diabetes    High levels of fetal hemoglobin interfere with the HbA1C  assay. Heterozygous hemoglobin variants (HbS, HgC, etc)do  not significantly interfere with this assay.   However, presence of multiple variants may affect accuracy.        Hypertension associated with diabetes  Stable, continue medication  Low sodium diet  BP Readings from Last 3 Encounters:   05/18/23 130/60   05/12/23 120/60   05/05/23 (!) 165/85      Hyperlipidemia associated with type 2 diabetes mellitus  -     Lipid Panel; Future; Expected date: 05/18/2023  Stable, on Lipitor  Type 2 diabetes mellitus with diabetic nephropathy, with long-term current use of insulin  -     Hemoglobin A1C; Future; Expected date: 05/18/2023  -     CBC W/ AUTO DIFFERENTIAL; Future; Expected date: 05/18/2023  -     COMPREHENSIVE METABOLIC PANEL; Future; Expected date: 05/18/2023    Chronic obstructive pulmonary disease, unspecified COPD type  Stable, continue resp inhaler  Stage 3 chronic kidney disease, unspecified whether stage 3a or 3b CKD  Stable and chronic.  " Will continue to monitor q3-6 months and control chronic conditions as optimally as possible to preserve function.   Ischemic stroke  Stable on aspirin and Lipitor  Hold Plavix in the setting of multiple intracranial microhemorrhages with increased risk of intracranial hemorrhage  Speech therapy  Tortuous aorta  Stable, continue management  Overweight (BMI 25.0-29.9)  Counseled patient on his ideal body weight, health consequences of being obese and current recommendations including weekly exercise and a heart healthy diet.  Current BMI is:Estimated body mass index is 28.59 kg/m² as calculated from the following:    Height as of this encounter: 4' 11" (1.499 m).    Weight as of this encounter: 64.2 kg (141 lb 8.6 oz)..  Patient is aware that ideal BMI < 25 or Weight in (lb) to have BMI = 25: 123.5.           Patient readiness: acceptance and barriers:none    During the course of the visit the patient was educated and counseled about the following:     Diabetes:  Addressed ADA diet.  Suggested low cholesterol diet.  Encouraged aerobic exercise.  Hypertension:   Dietary sodium restriction.  Regular aerobic exercise.  Obesity:   General weight loss/lifestyle modification strategies discussed (elicit support from others; identify saboteurs; non-food rewards, etc).  Regular aerobic exercise program discussed.    Goals: Diabetes: Maintain Hemoglobin A1C below 7, Hypertension: Reduce Blood Pressure, and Obesity: Reduce calorie intake and BMI    Did patient meet goals/outcomes: No    The following self management tools provided: blood glucose log    Patient Instructions (the written plan) was given to the patient/family.     Time spent with patient: 15 minutes    Barriers to medications present (no )    Adverse reactions to current medications (no)    Over the counter medications reviewed (Yes)          "

## 2023-05-31 ENCOUNTER — OFFICE VISIT (OUTPATIENT)
Dept: OPHTHALMOLOGY | Facility: CLINIC | Age: 63
End: 2023-05-31
Payer: MEDICARE

## 2023-05-31 ENCOUNTER — CLINICAL SUPPORT (OUTPATIENT)
Dept: REHABILITATION | Facility: HOSPITAL | Age: 63
End: 2023-05-31
Attending: FAMILY MEDICINE
Payer: MEDICARE

## 2023-05-31 ENCOUNTER — CLINICAL SUPPORT (OUTPATIENT)
Dept: REHABILITATION | Facility: HOSPITAL | Age: 63
End: 2023-05-31
Payer: MEDICARE

## 2023-05-31 DIAGNOSIS — R47.01 APHASIA: ICD-10-CM

## 2023-05-31 DIAGNOSIS — R53.1 RIGHT SIDED WEAKNESS: ICD-10-CM

## 2023-05-31 DIAGNOSIS — Z74.09 IMPAIRED FUNCTIONAL MOBILITY, BALANCE, GAIT, AND ENDURANCE: ICD-10-CM

## 2023-05-31 DIAGNOSIS — E11.3313 TYPE 2 DIABETES MELLITUS WITH BOTH EYES AFFECTED BY MODERATE NONPROLIFERATIVE RETINOPATHY AND MACULAR EDEMA, WITH LONG-TERM CURRENT USE OF INSULIN: Primary | ICD-10-CM

## 2023-05-31 DIAGNOSIS — R47.1 DYSARTHRIA: ICD-10-CM

## 2023-05-31 DIAGNOSIS — H25.813 COMBINED FORMS OF AGE-RELATED CATARACT, BILATERAL: ICD-10-CM

## 2023-05-31 DIAGNOSIS — R41.841 COGNITIVE COMMUNICATION DEFICIT: ICD-10-CM

## 2023-05-31 DIAGNOSIS — Z91.81 AT RISK FOR FALLING: ICD-10-CM

## 2023-05-31 DIAGNOSIS — H40.053 OCULAR HYPERTENSION, BILATERAL: ICD-10-CM

## 2023-05-31 DIAGNOSIS — I63.9 ISCHEMIC STROKE: ICD-10-CM

## 2023-05-31 DIAGNOSIS — Z79.4 TYPE 2 DIABETES MELLITUS WITH BOTH EYES AFFECTED BY MODERATE NONPROLIFERATIVE RETINOPATHY AND MACULAR EDEMA, WITH LONG-TERM CURRENT USE OF INSULIN: Primary | ICD-10-CM

## 2023-05-31 DIAGNOSIS — R26.9 GAIT ABNORMALITY: ICD-10-CM

## 2023-05-31 DIAGNOSIS — I63.9 CEREBROVASCULAR ACCIDENT (CVA) OF LEFT PONTINE STRUCTURE: ICD-10-CM

## 2023-05-31 PROCEDURE — 3060F PR POS MICROALBUMINURIA RESULT DOCUMENTED/REVIEW: ICD-10-PCS | Mod: CPTII,S$GLB,, | Performed by: OPHTHALMOLOGY

## 2023-05-31 PROCEDURE — 3046F HEMOGLOBIN A1C LEVEL >9.0%: CPT | Mod: CPTII,S$GLB,, | Performed by: OPHTHALMOLOGY

## 2023-05-31 PROCEDURE — 99999 PR PBB SHADOW E&M-EST. PATIENT-LVL III: ICD-10-PCS | Mod: PBBFAC,,, | Performed by: OPHTHALMOLOGY

## 2023-05-31 PROCEDURE — 3066F PR DOCUMENTATION OF TREATMENT FOR NEPHROPATHY: ICD-10-PCS | Mod: CPTII,S$GLB,, | Performed by: OPHTHALMOLOGY

## 2023-05-31 PROCEDURE — 1111F DSCHRG MED/CURRENT MED MERGE: CPT | Mod: CPTII,S$GLB,, | Performed by: OPHTHALMOLOGY

## 2023-05-31 PROCEDURE — 99214 PR OFFICE/OUTPT VISIT, EST, LEVL IV, 30-39 MIN: ICD-10-PCS | Mod: S$GLB,,, | Performed by: OPHTHALMOLOGY

## 2023-05-31 PROCEDURE — 1111F PR DISCHARGE MEDS RECONCILED W/ CURRENT OUTPATIENT MED LIST: ICD-10-PCS | Mod: CPTII,S$GLB,, | Performed by: OPHTHALMOLOGY

## 2023-05-31 PROCEDURE — 92134 CPTRZ OPH DX IMG PST SGM RTA: CPT | Mod: S$GLB,,, | Performed by: OPHTHALMOLOGY

## 2023-05-31 PROCEDURE — 3066F NEPHROPATHY DOC TX: CPT | Mod: CPTII,S$GLB,, | Performed by: OPHTHALMOLOGY

## 2023-05-31 PROCEDURE — 4010F ACE/ARB THERAPY RXD/TAKEN: CPT | Mod: CPTII,S$GLB,, | Performed by: OPHTHALMOLOGY

## 2023-05-31 PROCEDURE — 99214 OFFICE O/P EST MOD 30 MIN: CPT | Mod: S$GLB,,, | Performed by: OPHTHALMOLOGY

## 2023-05-31 PROCEDURE — 97162 PT EVAL MOD COMPLEX 30 MIN: CPT | Mod: PN

## 2023-05-31 PROCEDURE — 92134 OCT, RETINA - OU - BOTH EYES: ICD-10-PCS | Mod: S$GLB,,, | Performed by: OPHTHALMOLOGY

## 2023-05-31 PROCEDURE — 1160F RVW MEDS BY RX/DR IN RCRD: CPT | Mod: CPTII,S$GLB,, | Performed by: OPHTHALMOLOGY

## 2023-05-31 PROCEDURE — 1159F PR MEDICATION LIST DOCUMENTED IN MEDICAL RECORD: ICD-10-PCS | Mod: CPTII,S$GLB,, | Performed by: OPHTHALMOLOGY

## 2023-05-31 PROCEDURE — 3046F PR MOST RECENT HEMOGLOBIN A1C LEVEL > 9.0%: ICD-10-PCS | Mod: CPTII,S$GLB,, | Performed by: OPHTHALMOLOGY

## 2023-05-31 PROCEDURE — 3060F POS MICROALBUMINURIA REV: CPT | Mod: CPTII,S$GLB,, | Performed by: OPHTHALMOLOGY

## 2023-05-31 PROCEDURE — 4010F PR ACE/ARB THEARPY RXD/TAKEN: ICD-10-PCS | Mod: CPTII,S$GLB,, | Performed by: OPHTHALMOLOGY

## 2023-05-31 PROCEDURE — 96125 COGNITIVE TEST BY HC PRO: CPT | Mod: PN

## 2023-05-31 PROCEDURE — 99999 PR PBB SHADOW E&M-EST. PATIENT-LVL III: CPT | Mod: PBBFAC,,, | Performed by: OPHTHALMOLOGY

## 2023-05-31 PROCEDURE — 1159F MED LIST DOCD IN RCRD: CPT | Mod: CPTII,S$GLB,, | Performed by: OPHTHALMOLOGY

## 2023-05-31 PROCEDURE — 1160F PR REVIEW ALL MEDS BY PRESCRIBER/CLIN PHARMACIST DOCUMENTED: ICD-10-PCS | Mod: CPTII,S$GLB,, | Performed by: OPHTHALMOLOGY

## 2023-05-31 NOTE — PLAN OF CARE
"[facility]  Speech Therapy Evaluation -Neurological Rehabilitation    Date: 5/31/2023     Name: Steff Johnson   MRN: [id number]    Therapy Diagnosis:   Encounter Diagnoses   Name Primary?    Cognitive communication deficit     Aphasia      Physician: Cristina Ren MD  Physician Orders: Ambulatory Referral to Speech Therapy   Medical Diagnosis:   I63.9 (ICD-10-CM) - Ischemic stroke   R47.1 (ICD-10-CM) - Dysarthria     Visit # / Visits Authorized:  1 / 1   Date of Evaluation:  5/31/2023   Insurance Authorization Period: 5/31/2023 to 6/30/2023  Plan of Care Certification:    5/31/2023 to 8/23/2023      Time In: 8:45 AM   Time Out: 9:30 AM   Total time: 45 minutes     Procedure   Cognitive Communication Evaluation including scoring and interpretation (Total time: 95 minutes)     Precautions: Standard, Fall, Diabetes , Cognition, and Communication  Subjective   Date of Onset: 5/2/2023  History of Current Condition:  Steff Johnson is a 63 y.o. female who presents to [facility] Outpatient Speech Therapy for evaluation secondary to ischemic stroke and dysarthria. Patient was referred to therapy by Cristina Ren MD , which is the patient's primary care provider.   Patient attended evaluation by herself.     5/2/23 doctor note - "hospital visit for acute ischemic stroke, hypertensive emergency-PO/OT-vision changes with slurred speech, treated for hypertensive emergency, MRI with acute right medial midbrain infarct, seen by Neurology, dual antiplatelet therapy. As per history, noted to have left facial droop with right upper extremity weakness, slurred speech with word-finding deficits."     5/3/2023 SLP note - "Pt evaluated for stroke workup. Pt presents w/ mild dysarthria and severe cognitive-linguistic deficits. No swallowing deficits noted. Rec regular diet, thin liquids."    Patient reports difficulties with speech, language, memory.     Past Medical History: Steff Johnson  has " a past medical history of Arthritis, Complete tear of left rotator cuff (11/09/2017), COPD (chronic obstructive pulmonary disease), COVID-19 infection (07/02/2021), Diabetes mellitus, H/o Cerebrovascular accident (CVA) of left pontine structure (12/12/2019), Hypertension, Personal history of colonic polyps, Stroke, and Wears glasses.  Steff Johnson  has a past surgical history that includes Shoulder surgery; Hysterectomy; Colonoscopy (N/A, 4/11/2017); Oophorectomy; and transesophageal echocardiogram with possible cardioversion (justyna w/ poss cardioversion) (N/A, 5/4/2023).  Medical Hx and Allergies: Steff has a current medication list which includes the following prescription(s): amlodipine, aspirin, atorvastatin, chlorthalidone, ozurdex, hydralazine, insulin aspart u-100, insulin degludec, losartan, metoprolol succinate, pen needle, diabetic, tiotropium, [DISCONTINUED] blood-glucose meter, and [DISCONTINUED] lancing device. Review of patient's allergies indicates:  No Known Allergies  Prior Therapy: Few sessions in hospital. No therapy since discharge from hospital.   Social History: Patient lives with your  in the home, Patient is not currently driving.  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    Prior Level of Function: Normal speech, language, cognition.    Current Level of Function: Speech, language, cognition deficits  Pain Scale: 0/10 on a Visual Analog Scale currently.  Pain Location: n/a  Patient's Therapy Goals:  Get back to normal.   Objective   Formal Assessment:  Cognitive Linguistic Quick Test (CLQT), Aphasia Administration was administered to quickly assess the patient's overall cognitive-linguistic function and to determine cognitive strengths and weaknesses.           Cognitive Domain Score     Severity Rating      Attention    55 / 215 moderate  Memory    104 / 185 moderate   Executive Functions   3 / 40  severe  Language    23 / 37  moderate  Visuospatial  Skills    22 / 105 severe  Clock Drawing    10 / 13  mild    Composite Severity Rating  1.6 / 4  moderate    Task Score Ages 18-69 Cut Score Below?   Personal Facts  6  8 below   Symbol Cancellation 4  11 below   Confrontational naming  10  10 WNL   Clock drawing  10  12 below   Story Retelling 5  6 below   Symbol Trails 1  9 below   Generative Naming 2  5 below   Design Memory 3  5 below   Mazes 0  7 below   Design Generation  0  6 below        Description: Patient presents with moderate to moderately severe cognitive deficits characterized by deficits with attention, memory, reasoning/problem solving/executive functioning, and visuospatial skills.     Western Aphasia Battery - Revised (WAB-R) was administered to evaluate the patient's receptive and expressive language function.The purpose stated in the manual for the WAB-R is to determine the presence, severity, and type of aphasia; measure the patient's level of performance to provide a baseline for detecting change over time; provide a comprehensive assessment of the patients language strengths and deficits in order to guide treatment and management; infer the location and etiology of the lesion causing the aphasia.  The following results were revealed:     Spontaneous Speech Score: 15   Auditory Comprehension Score: 17.8 / 10  Repetition Score:   9.6 /10  Naming and Word Finding Score: 8.5/ 10  Aphasia Quotient (AQ):   84 / 100  Aphasia Classification: Anomic     Subtest Results:   Information Content: 8 / 10  Fluency, Grammatical Competence, and Paraphasias: 7 /10  Yes / No Questions: 54 / 60  Auditory Word Recognition: 60 / 60  Sequential Commands: 64 / 80  Repetition: 96 /100  Object Namin /60  Word Fluency:  / 20  Sentence Completion: 10 /10  Responsive Speech: 10 / 10    Description: Patient presents with anomic aphasia characterized by short sentences with grammatical errors, at least moderate word finding difficulties, paraphasias, and semantic  jargon. Patient noted to exhibit slight difficulties with answering complex yes/no questions and multi-step commands. Significant difficulties with divergent naming.     Language Skills: Impaired  Auditory Comprehension: See above  Verbal Expression: See above  Reading Comprehension: Recommend monitoring. Patient reports she did not read often before the stroke so this is not an area of priority.   Written Expression: Recommend monitoring.    Motor Speech Skills: Patient presents with dysarthria and nasality impairments. Recommend assessing at next session.     Hearing / Vision: Patient reports no hearing deficits. Patient exhibited good ability to hear directions during assessment as evidenced by completing tasks without needing a repetition of directions.  Patient with history of cranial nerve III palsy, bilateral ocular hypertension, bilateral cataracts.     Treatment   Total Treatment Time Separate from Evaluation: not applicable   No treatment performed secondary to time to complete evaluation.     Education provided:   -role of Speech Therapy, goals/plan of care, scheduling/cancellations, insurance limitations with patient  -Additional Education provided:     Patient verbalized understanding.     Home Program: Not yet established   Assessment     Steff presents to [facility] status post medical diagnosis of ischemic stroke and dysarthria.     Interpretation of objective assessment:   She presents with:  Moderate to moderately severe cognitive deficits as characterized by deficits with orientation, attention, memory, reasoning/problem solving/executive functioning, and visuospatial skills.  Anomic aphasia as characterized by short sentences with grammatical errors, at least moderate word finding difficulties, paraphasias, and semantic jargon.  Dysarthria - recommend formal assessment at next session.     Demonstrates impairments including limitations as described in the problem list.      Positive prognostic factors: Patient's motivation, compliance with tasks.   Negative prognostic factors: Complicated medical history may impact recovery (diabetes, chronic kidney disease)  Barriers to therapy: Cranial nerve III palsy and cataracts may impact vision and thus ability to complete visual tasks.     Patient's spiritual, cultural, and educational needs considered and patient agreeable to plan of care and goals.    Patient will benefit from skilled therapy.    Rehab Potential: good    Short Term Goals: (4 weeks) Current Progress:   1. Patient will answer complex yes/no questions with 80% accuracy to target auditory comprehension.    Progressing/ Not Met 5/31/2023   Established this date   2. Patient will follow 2-3 step directions with 80% accuracy across three sessions.      Progressing/ Not Met 5/31/2023   Established this date   3. Patient will identify at least 4 word finding strategies across three sessions.     Progressing/ Not Met 5/31/2023   Established this date    4. Patient will identify x10 items within a concrete divergent naming task within 1-2 minutes.      Progressing/ Not Met 5/31/2023   Established this date    5. Patient will participate in Verb Network Strength Training for 2-4 verbs per session with min A to improve word fluency.      Progressing/ Not Met 5/31/2023   Established this date    6. Patient will describe a picture using at least 5/6 characteristics given a Semantic Feature Analysis visual in 3/3 trials per session to address word finding strategies.     Progressing/ Not Met 5/31/2023   Established this date    7. Patient will answer orientation questions: month, year, day of the week, place, etiology with 100% accuracy across three sessions.     Progressing/ Not Met 5/31/2023   Established this date    8. Patient will complete low level functional problem solving tasks with 80% accuracy given minimal verbal and visual cues.    Progressing/Not Met 5/31/2023 Established  this date   9. Complete motor speech evaluation to evaluate dysarthria.    Progressing/Not Met 5/31/2023 Established this date         Long Term Goals: (12 weeks) Current Progress:   1. Patient will develop functional expressive language skills to communicate wants,needs, and emotions effectively to variety of communication partners in medical and home environments Established this date     2. Pt will improve cognitive skills to effectively utilize compensatory strategies to communicate wants and needs to different conversational partners, maintain safety, and participate socially in functional living environment.    Established this date     3. Patient will demonstrate independent use of clear speech strategies and environmental modifications to facilitate communication.   Established this date       Plan     Recommended Treatment Plan:  Patient will participate in the [facility] program for speech therapy 2 times per week for 12 weeks to address her Communication, Cognition, and Motor Speech deficits, to educate patient and their family, and to participate in a home exercise program.    Other Recommendations:   N/a    Therapist's Name:   Nancy Dodson CCC-SLP   5/31/2023

## 2023-05-31 NOTE — PLAN OF CARE
[facility]  Physical Therapy Neurological Rehabilitation Initial Evaluation    Name: Steff Johnson  Clinic Number: [id number]    Therapy Diagnosis:   Encounter Diagnoses   Name Primary?    Gait abnormality     Acute ischemic stroke      Right sided weakness     At risk for falling     Impaired functional mobility, balance, gait, and endurance        Physician: Cristina Ren MD    Physician Orders: PT Eval and Treat   Medical Diagnosis from Referral:   R26.9 (ICD-10-CM) - Gait abnormality   I63.9 (ICD-10-CM) - Ischemic stroke     Evaluation Date: 5/31/2023  Authorization Period Expiration: 6/30/23  Plan of Care Expiration: 8/5/2023  Visit # / Visits authorized: 1/ 1    Time In: 9:35 am  Time Out: 10:20 am  Total Billable Time: 45 minutes    Precautions: Standard, Fall, and COPD, DM, check vitals    Subjective   Date of onset: 5/2/23  History of current condition - Steff reports: Arrived to ED on 5/2/23 displaying facial droop, weakness of R side, and slurred speech. History of inconsistency with taking medications as prescribed per chart review with multiple CVA's since 2019. She is right handed. Requires Min A for bed mobility, transfers, and gait. She is dependent for long distances with use of Rollator as wheelchair(discussed safety issues with this method of transportation). Limited mobility in RUE post stroke and needs assistance with ADL's. Impaired speech post stroke effecting history.      Medical History:   Past Medical History:   Diagnosis Date    Arthritis     Complete tear of left rotator cuff 11/09/2017    COPD (chronic obstructive pulmonary disease)     COVID-19 infection 07/02/2021    Diabetes mellitus     H/o Cerebrovascular accident (CVA) of left pontine structure 12/12/2019    Hypertension     Personal history of colonic polyps     Stroke     Wears glasses        Surgical History:   Steff Johnson  has a past surgical history that includes Shoulder surgery;  Hysterectomy; Colonoscopy (N/A, 4/11/2017); Oophorectomy; and transesophageal echocardiogram with possible cardioversion (justyna w/ poss cardioversion) (N/A, 5/4/2023).    Medications:   Steff has a current medication list which includes the following prescription(s): amlodipine, aspirin, atorvastatin, chlorthalidone, ozurdex, hydralazine, insulin aspart u-100, insulin degludec, losartan, metoprolol succinate, pen needle, diabetic, tiotropium, [DISCONTINUED] blood-glucose meter, and [DISCONTINUED] lancing device.    Allergies:   Review of patient's allergies indicates:  No Known Allergies     Imaging, MRI studies: 5/2/23  Impression:  1. Superimposed upon chronic age-related changes and multiple regions of either chronic microhemorrhage or amyloid angiopathy are findings of 2 small regions of acute infarct within the right posterior midbrain in the mid left temporal lobe.  2. Other age related changes as described above.    Prior Therapy: yes for previous stroke  Social History: lives with their spouse and her brother, lives in an apartment, states she lives on first floor but has stairs to get in. Describes a flight of stairs but it's unclear  Falls: none since the stroke. Reports 1 fall in the last year   DME: Straight cane, Rollator, Shower chair, and grab bars by toilet area    Home Environment: apartment living with carpet   Exercise Routine / History: walking every day prior to stroke for 10-15 minutes   Family Present at time of Eval: pt's  drove her but is not present during evaluation.    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     Pts goals: to become as independent as she was prior to stroke    Objective     BP: 160/79 mmHg  HR: 66 bpm    - Follows commands: yes   - Speech: see detailed  speech evaluation. Pt has difficulty with word finding and is difficult to understand.     Mental status: alert and oriented to person and place, but not year. Pt states year as 2008.   Appearance: Casually dressed  Behavior:  calm and cooperative  Attention Span and Concentration:  Normal    Dominant hand:  right     Posture Alignment :slouched posture    Skin integrity:  Intact    Sensation:  Light Touch: Intact           Proprioception:   Impaired:     Visual/Auditory: denies changes   Tracking:Intact  Saccades: NT  Acuity:NT  R/L discrimination: Intact    Coordination:   - fine motor: NT, see OT evaluation   - UE coordination: finger to nose: impaired, overshooting                       Pronation/ supination: NT  - LE coordination:  alternating toe taps: intact                                Heel to shin: equal bilaterally    ROM:   UPPER EXTREMITY--AROM/PROM  (R) UE: See OT evaluation. RUE grossly limited in flexion and abduction to 110 degrees  (L) UE: See OT evaluation           RANGE OF MOTION--LOWER EXTREMITIES  (R) LE Hip: normal   Knee: full   Ankle: normal    (L) LE: Hip: normal   Knee: full   Ankle: normal    Strength: manual muscle test grades below   Lower Extremity Strength   RLE LLE   Hip Flexion: 4+/5 4+/5   Hip Extension:  4-/5 4/5   Hip Abduction: 4-/5 4+/5   Hip Adduction: NT NT   Knee Extension: 5/5 5/5   Knee Flexion: 4/5 4+/5   Ankle Dorsiflexion: 4+/5 5/5   Ankle Plantarflexion: 4/5 5/5     Abdominal Strength: 4/5       Evaluation   Single Limb Stance R LE unable  (<10 sec = HIGH FALL RISK)   Single Limb Stance L LE unable  (<10 sec = HIGH FALL RISK)   5 times sit-stand 44 seconds  >12 sec= fall risk for general elderly  >16 sec= fall risk for Parkinson's disease  >10 sec= balance/vestibular dysfunction (<59 y/o)  >14.2 sec= balance/vestibular dysfunction (>59 y/o)  >12 sec= fall risk for CVA   Boyce/ FGA/ Tinetti NT     Standing standing, wide base of support: 5 seconds    Gait Assessment:    - AD used: Rollator  - Assistance: CGA-Min A with frequent VC for safe distance from Rollator  - Distance: 50 ft      GAIT DEVIATIONS:  Gait component performance:   Poor weight shift to RLE with poor foot clearance bilaterally(worse on R) with R  hip ER.    Ambulates with use of Rollator and CGA-Min A from PT. Frequent cues for safe distances from rollator during gait and reminds for locking Rollator prior to sitting.   * Above impairments indicate decreased gait safety and increased fall risk.      Impairments contributing to deviations: impaired motor control, weakness, balance,    Endurance Deficit: poor     Functional Mobility (Bed mobility, transfers)  Bed mobility: Min A  Supine to sit: Min A  Sit to supine: Min A  Rolling: Min A  Transfers to bed: Min A-CGA  Transfers to toilet: NT  Sit to stand:  Lam-CGA  Stand pivot:  Min A-CGA  Car transfers: NT  Wheelchair mobility: pt currently using rollator as wheelchair and is dependent for propelling.   Floor transfers: NT       CMS Impairment/Limitation/Restriction for FOTO  Survey    Therapist reviewed FOTO scores for Steff Johnson on 5/31/2023.   FOTO documents entered into [employer] - see Media section.    Limitation Score: %  Category:          TREATMENT   Initial evaluation only. No treatment due to time constraints.     Home Exercises and Patient Education Provided    Education provided:   - safety with Rollator: working on locking and unlocking breaks, close proximity during gait, not using Rollator as wheelchair    Written Home Exercises Provided: Not given today.     Assessment   Steff is a 63 y.o. female referred to outpatient Physical Therapy with a medical diagnosis of gait abnormality and ischemic stroke. Pt presents with s/p stroke on 5/2/23. She has a history of strokes over the last 1-2 years. She displays right sided weakness with range of motion deficits noted in RUE, decreased RLE strength, impaired transfers, gait, and balance. Pt was educated  on safety with use of ambulating with Rollator and transporting with Rollator. She is at risk for falls due to impaired balance and gait abnormality. She would benefit from physical therapy at this time to address limitations and return to prior level of function.     Pt prognosis is Good.   Pt will benefit from skilled outpatient Physical Therapy to address the deficits stated above and in the chart below, provide pt/family education, and to maximize pt's level of independence.     Plan of care discussed with patient: Yes  Pt's spiritual, cultural and educational needs considered and patient is agreeable to the plan of care and goals as stated below:     Anticipated Barriers for therapy: poor compliance with medication, history of multiple strokes    Medical Necessity is demonstrated by the following  History  Co-morbidities and personal factors that may impact the plan of care Co-morbidities:   COPD/asthma, diabetes, high BMI, history of CVA, and HTN    Personal Factors:   Multiple CVAs due to poor medication compliance     moderate   Examination  Body Structures and Functions, activity limitations and participation restrictions that may impact the plan of care Body Regions:   head  lower extremities  trunk    Body Systems:    gross symmetry  ROM  strength  balance  gait  transfers    Participation Restrictions:   Unable to drive, fall risk,     Activity limitations:   Learning and applying knowledge  solving problems    General Tasks and Commands  undertaking multiple tasks    Communication  communicating with/receiving spoken language    Mobility  walking  moving around using equipment (WC)  driving (bike, car, motorcycle)  Balance, stairs, transfers    Self care  washing oneself (bathing, drying, washing hands)  dressing  looking after one's health    Domestic Life  shopping  cooking  doing house work (cleaning house, washing dishes, laundry)    Interactions/Relationships  complex interpersonal  interactions    Life Areas  employment    Community and Social Life  community life  recreation and leisure         moderate   Clinical Presentation evolving clinical presentation with changing clinical characteristics moderate   Decision Making/ Complexity Score: moderate     Goals:  Short Term Goals: 6 weeks   1. Pt will report compliance with HEP >/= 3x/week to improve carry over.   2. Pt will perform 5x sit<> </=30 seconds to improve functional strength and endurance.   3. Pt will transition supine to sit with independence to improve bed mobility at home.   4. Pt will demonstrate improved endurance by ability to ride [employer] NuStep for 15 minutes at level 3 without rest breaks.   5. Pt will improve safety with Rollator by demonstrating appropriate locking and unlocking of breaks during gait and transfers.   6. Pt will ambulate with Rollator for 800' with SBA and displaying appropriate gait patterns to reduce risk of falling.    Long Term Goals: 12 weeks   1. Pt will report compliance with HEP >/= 3x/week to improve carry over.   2. Pt will perform 5x sit<> </=20 seconds to improve functional strength and endurance.   3. Pt will perform sit to stand transfer without use of hands for 7/10 trials to demonstrate improved LE strength.  4. Pt will improve safety with Rollator by demonstrating appropriate locking and unlocking of breaks during gait and transfers.   5. Pt will ambulate with SPC or no AD on variable surfaces to demonstrate improved independence for community.   6. Pt will balance in Full [place] for 2 minutes without LOB to improve independence for ADL's.   7. Pt will balance on AirEx in full [place] for 30 seconds eyes open to reduce fall risk.    Plan   Plan of care Certification: 5/31/2023 to 8/5/2023.    Outpatient Physical Therapy 2 times weekly for 12 weeks to include the following interventions: Gait Training, Manual Therapy, Moist Heat/ Ice, Neuromuscular Re-ed, Orthotic  Management and Training, Patient Education, Therapeutic Activities, and Therapeutic Exercise.     Alisa Lopez, PT, DPT

## 2023-05-31 NOTE — PROGRESS NOTES
HPI    DLS: 4/19/2023 DME     Pt states no new complaints. Denies pain/ FOL/ floaters.     Stroke 5/2. States has been having trouble seeing out of OD  Last edited by Shannon Blank on 5/31/2023  2:12 PM.             A/P    ICD-10-CM ICD-9-CM   1. Type 2 diabetes mellitus with both eyes affected by moderate nonproliferative retinopathy and macular edema, with long-term current use of insulin  E11.3313 250.50    Z79.4 362.05     362.07     V58.67   2. Ocular hypertension, bilateral  H40.053 365.04   3. Combined forms of age-related cataract, bilateral  H25.813 366.19       1. Type 2 diabetes mellitus with both eyes affected by moderate nonproliferative retinopathy and macular edema, with long-term current use of insulin  PCP Cristina Ren MD  05/03/2023  10.2  A1C     Pt had recent stroke 5/2/23    OD: s/p ODX 3/8/23  [facility] 20/40 (was 20/30) , improved IRF after ODX  Plan: needs Ozurdex but pt just had stroke few weeks ago and wants to wait on injection    OS: s/p ODX 3/8/23  VA 20/40 (was 20/30) , improved  IRF after ODX  Plan: needs Ozurdex but pt just had stroke few weeks ago and wants to wait on injection for a few weeks    Recommend good blood pressure control, tight blood glucose control, and good cholesterol control     2. Ocular hypertension, bilateral  IOP 27/25 on Brim  BID  F/b Dr. Ambrose  Stressed compliance  Plan: incr brim to TID, thick corneas     3. Combined forms of age-related cataract, bilateral  Mod NS, borderline VS  Plan: Observation, update Mrx     RTC Fiorella 3-4 weeks OCTm OU , Ozurdex OU   C Arnol as scheduled     I saw and examined the patient and reviewed in detail the findings documented. The final examination findings, image interpretations, and plan as documented in the record represent my personal judgment and conclusions.    Fito Barros MD  Vitreoretinal Surgery   [facility]

## 2023-06-05 ENCOUNTER — TELEPHONE (OUTPATIENT)
Dept: FAMILY MEDICINE | Facility: CLINIC | Age: 63
End: 2023-06-05
Payer: MEDICARE

## 2023-06-05 NOTE — TELEPHONE ENCOUNTER
----- Message from Susan Vera sent at 6/5/2023  1:47 PM CDT -----  Regarding: Medication Refill      Good afternoon,          A representative of Mrs Johnson came in and said that she would like her prescriptions refilled and sent over the [facility] on [address]. Please give her a call at [phone]. Thank you,

## 2023-06-05 NOTE — TELEPHONE ENCOUNTER
Try calling patient back on numbers listed, no answer could not leave voice message, line busy. Do not know the name of the medication patient requesting to be refill.

## 2023-06-06 RX ORDER — LOSARTAN POTASSIUM 100 MG/1
100 TABLET ORAL DAILY
Qty: 90 TABLET | Refills: 3 | Status: ON HOLD | OUTPATIENT
Start: 2023-06-06 | End: 2024-02-29

## 2023-06-06 RX ORDER — AMLODIPINE BESYLATE 10 MG/1
10 TABLET ORAL DAILY
Qty: 90 TABLET | Refills: 3 | Status: ON HOLD | OUTPATIENT
Start: 2023-06-06 | End: 2024-02-29

## 2023-06-06 NOTE — TELEPHONE ENCOUNTER
No care due was identified.  [facility] Embedded Care Due Messages. Reference number: [id number].   6/06/2023 10:22:36 AM CDT

## 2023-06-06 NOTE — TELEPHONE ENCOUNTER
----- Message from Leida Alves sent at 6/6/2023  9:25 AM CDT -----  Regarding: med refill  Can the clinic reply in [facility]:       Please refill the medication(s) listed below.       Please call the patient when the prescription(s) is ready for  at this phone number:         Medication #1 amLODIPine (NORVASC) 10 MG tablet    Medication #2 losartan (COZAAR) 100 MG tablet    Preferred Pharmacy:     [id number] [address] [address]  Phone: [phone] Fax: [phone]

## 2023-06-08 ENCOUNTER — CLINICAL SUPPORT (OUTPATIENT)
Dept: REHABILITATION | Facility: HOSPITAL | Age: 63
End: 2023-06-08
Attending: HOSPITALIST
Payer: MEDICARE

## 2023-06-08 ENCOUNTER — OUTPATIENT CASE MANAGEMENT (OUTPATIENT)
Dept: ADMINISTRATIVE | Facility: OTHER | Age: 63
End: 2023-06-08
Payer: MEDICARE

## 2023-06-08 DIAGNOSIS — R53.1 DECREASED STRENGTH: ICD-10-CM

## 2023-06-08 DIAGNOSIS — M25.60 DECREASED RANGE OF MOTION: ICD-10-CM

## 2023-06-08 DIAGNOSIS — Z74.09 IMPAIRED FUNCTIONAL MOBILITY, BALANCE, AND ENDURANCE: ICD-10-CM

## 2023-06-08 DIAGNOSIS — Z78.9 ALTERATION IN INSTRUMENTAL ACTIVITIES OF DAILY LIVING (IADL): ICD-10-CM

## 2023-06-08 DIAGNOSIS — I63.9 ISCHEMIC STROKE: ICD-10-CM

## 2023-06-08 DIAGNOSIS — Z78.9 DECREASED INDEPENDENCE WITH ACTIVITIES OF DAILY LIVING: ICD-10-CM

## 2023-06-08 PROCEDURE — 97166 OT EVAL MOD COMPLEX 45 MIN: CPT | Mod: PN

## 2023-06-08 NOTE — PROGRESS NOTES
Occupational Therapy   Evaluation    Steff Johnson   MRN: [id number]     Occupational therapy evaluation complete. Please see attached plan of care for details. Thank you for consult!    Michelle Vera MS, OTR/L   6/8/2023

## 2023-06-09 PROBLEM — Z74.09 IMPAIRED FUNCTIONAL MOBILITY, BALANCE, AND ENDURANCE: Status: ACTIVE | Noted: 2023-06-09

## 2023-06-09 PROBLEM — Z78.9 DECREASED INDEPENDENCE WITH ACTIVITIES OF DAILY LIVING: Status: ACTIVE | Noted: 2023-06-09

## 2023-06-09 PROBLEM — R53.1 DECREASED STRENGTH: Status: ACTIVE | Noted: 2023-06-09

## 2023-06-09 PROBLEM — Z78.9 ALTERATION IN INSTRUMENTAL ACTIVITIES OF DAILY LIVING (IADL): Status: ACTIVE | Noted: 2023-06-09

## 2023-06-09 PROBLEM — M25.60 DECREASED RANGE OF MOTION: Status: ACTIVE | Noted: 2023-06-09

## 2023-06-09 NOTE — PLAN OF CARE
"[facility]   Occupational Therapy Initial Neurological Evaluation     Name: Steff Johnson  Clinic Number: [id number]    Therapy Diagnosis:   Encounter Diagnoses   Name Primary?    Acute ischemic stroke      Decreased strength     Decreased range of motion     Decreased independence with activities of daily living     Impaired functional mobility, balance, and endurance     Alteration in instrumental activities of daily living (IADL)      Physician: Vilma Rodriguez MD    Physician Orders: Eval and Treat  Medical Diagnosis: I63.9 (ICD-10-CM) - Ischemic stroke  Evaluation Date: 6/8/2023  Insurance Authorization Period Expiration: 7/6/2023  Plan of Care Certification Period: 9/1/2023  Date of Return to MD: to be determined   Visit # / Visits authorized: 1 / 1  FOTO: to be assessed    Precautions:  Standard, Fall, COPM, Diabetes    Time In:8:45 AM  Time Out: 9:30 AM  Total Appointment Time (timed & untimed codes): 45 minutes    Subjective     Date of Onset: 5/2/2023    History of Current Condition: "I had a stroke. It was a while ago. I'm not sure when it was. I was feeling bad, so I went to the hospital. They told me I had a stroke."     It is very difficult for the patient to describe what happened. She pauses frequently to think. After asking many questions, she was able to verbalize that her right side was affected and she was discharged home after 5 days in the hospital.    Dysarthria that the patient is experiencing post stroke is effecting thorough history of condition. Pt's friend brought her to therapy, but did not come back for the evaluation.     Involved Side: Right   Dominant Side: Left    Surgical Procedure: No   Imaging: MRI studies: 5/2/23  Impression:  1. Superimposed upon chronic age-related changes and multiple regions of either chronic microhemorrhage or amyloid angiopathy are findings of 2 small regions of acute infarct within the right posterior midbrain in the mid " "left temporal lobe.  2. Other age related changes as described above.    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     Functional Limitations/Social History:    Prior Level of Function: Independent with all ADLs.   Current Level of Function:  "I have trouble talking. I have a friend who lives with me and helps me pretty much do everything."     Current Functional Status   Home/Living environment: lives with a friend    - lives in trailer home, 3 steps to enter    - DME: wheelchair, walker, shower chair, bedside commode, grab bars       Limitation of Functional Status as follows: pt observed with difficulty specifically stating how she was performing at home.    ADLs/IADLs:     - Bed mobility: Min A    - Transfers: Sit <> supine: Min A / Sit <> stand: min A     - Feeding: I can do that. I'm left handed.     - Bathing: I need help, my friends helps me sometimes (mod A)     - Dressing/Grooming: My friend helps me sometimes (mod A)     - Toileting: "I do that myself."  - Hygiene: "My friend helps. Teamwork" (mod A)    - Home Management: not completing since stroke - Cooking, cleaning, laundry.    - Driving: no      Leisure: watch tv     Patient's Goals for Therapy: "All of it. Take care of myself."     Past Medical History/Physical Systems Review:     Past Medical History:  Steff Johnson  has a past medical history of Arthritis, Complete tear of left rotator cuff, COPD (chronic obstructive pulmonary disease), COVID-19 infection, Diabetes mellitus, H/o Cerebrovascular accident (CVA) of left pontine structure, Hypertension, Personal history of colonic polyps, Stroke, and Wears " "glasses.    Past Surgical History:  Steff Johnson  has a past surgical history that includes Shoulder surgery; Hysterectomy; Colonoscopy (N/A, 4/11/2017); Oophorectomy; and transesophageal echocardiogram with possible cardioversion (justyna w/ poss cardioversion) (N/A, 5/4/2023).    Current Medications:  Steff has a current medication list which includes the following prescription(s): amlodipine, aspirin, atorvastatin, chlorthalidone, ozurdex, hydralazine, insulin aspart u-100, insulin degludec, losartan, metoprolol succinate, pen needle, diabetic, tiotropium, [DISCONTINUED] blood-glucose meter, and [DISCONTINUED] lancing device.    Allergies:  Review of patient's allergies indicates:  No Known Allergies     Objective     Cognitive Exam:  Oriented: Person, Place   Behaviors: normal, cooperative  Follows Commands/attention: Follows one-step commands  Communication: dysarthria  Memory: Impaired STM as determined by 3 word recall after 1 minute and 3 minutes  Safety awareness/insight to disability: aware of diagnosis, treatment, and prognosis  Coping skills/emotional control: Appropriate to situation    Visual/Perceptual: "it changed." Pt with difficulty describing the changes "sometimes it closes." She stated, "yes" when asked if her vision was blurry. Pt pointing to right eye.       Physical Exam:  Postural examination/scapula alignment: Rounded shoulder, Head forward, and Slouched posture  Joint integrity: no concerns   Skin integrity: visible skin intact  Edema: none observed      Joint Evaluation  AROM  6/8/2023 AROM  6/8/2023    Right  Left    Shoulder flex 0-180 108 attempts to lift higher by going into abduction  WFL   Shoulder Abd 0-180 120 - min leaning to left side  WFL   Shoulder ER 0-90 Mod decrs  WFL   Shoulder IR 0-90 WFL  WFL   Shoulder Extension 0-80 43 WFL   Elbow flex/ext 0-150 WFL WFL   Wrist flex 0-80 55 WFL   Wrist ext 0-70 58 WFL   Supination 0-80 Min decrs WFL   Pronation 0-80 WFL  WFL  "     Fist: normal      Strength 6/8/2023 6/8/2023   **within available ROM** Right  Left    Shoulder flex 3-/5 4/5   Shoulder abd 3-/5 4/5   Shoulder ER 4-/5 4/5   Shoulder IR 3-/5 4/5   Shoulder Extension 3-/5 4/5   Elbow flex 4-/5 4/5   Elbow ext 4-/5 4/5   Wrist flex 4-/5 4/5   Wrist ext 3-/5 4/5     Hand Strength (ART Dynamometer, Setting II)   (lbs) Left  6/8/2023  Right  6/8/2023    1 33 22   2 36 21   3 30 20   avg 33 21   [norms for women aged 65-69: R=49.6 +/-9.7; L=41.0 +/-8.2 (Mathiowetz et al, 1985)]    Pinch Left  6/8/2023  Right  6/8/2023    Lateral 13 11   Tip (2 point) 6 5   3 jaw ismael 7 4      Gross motor coordination:   MYRIAM (Rapid Alternating Movements): right side impaired  Finger to Nose (5 times): impaired, keeps arms close to body instead of extended  Finger Flicks (coordination moving from digit flexion to digit extension): impaired right     Box and Block Assessment Left Right   6/8/2023 18 10   [norms for women aged 65-69: R=72.0 +/-6.2; L=71.3 +/-7.7 (Mathiowetz et al, 1985)]    Fine motor coordination:   -   Thumb to Finger Opposition: intact      9 Hole Peg Test (9HPT) Left Right   6/8/2023 58s 1.36   [norms for women aged 61-65: R=18.99s +/-2.18s; L=20.33 +/-2.76s (Anastacio et al, 2003)]     Tone:  Modified Silvano Scale:   0 - No increase in muscle tone    Sensation:  Steff  reports no numbness or tingling.   Light touch: bilateral intact    Balance:   Static Sit - GOOD: Takes MODERATE challenges from all directions  Dynamic sit- GOOD-: Takes MODERATE challenges from all directions but inconsistently  Static Stand - see PT eval   Dynamic stand - see PT eval     Endurance Deficit: moderate                    Treatment     Total Treatment time separate from Evaluation time:0 minutes (time did not allow for separate treatment)     Home Exercises and Patient Education Provided     Education provided:   -role of OT, goals for OT, scheduling/cancellations, insurance limitations with  patient.    Written Home Exercises Provided: not given today     Assessment     Steff Johnson is a 63 y.o. female referred to outpatient occupational therapy and presents with a medical diagnosis of ischemic stroke.      Following medical record review it is determined that pt will benefit from occupational therapy services in order to maximize pain free and/or functional use of right upper extremity during ADLs, IADLs, and leisure tasks. The following goals were discussed with the patient and patient is in agreement with them as to be addressed in the treatment plan. The patient's rehab potential is Good.     Anticipated barriers to occupational therapy: communication     Plan of care discussed with patient: Yes  Patient's spiritual, cultural and educational needs considered and patient is agreeable to the plan of care and goals as stated below:     Medical Necessity is demonstrated by the following  Occupational Profile/History  Co-morbidities and personal factors that may impact the plan of care [x] LOW: Brief chart review  [] MODERATE: Expanded chart review   [] HIGH: Extensive chart review    Moderate / High Support Documentation:      Examination  Performance deficits relating to physical, cognitive or psychosocial skills that result in activity limitations and/or participation restrictions  [] LOW: addressing 1-3 Performance deficits  [] MODERATE: 3-5 Performance deficits  [x] HIGH: 5+ Performance deficits (please support below)    Moderate / High Support Documentation:    Physical:  Muscle Power/Strength  Muscle Endurance  Control of Voluntary Movement   Strength  Pinch Strength  Gross Motor Coordination  Fine Motor Coordination  Postural Control    Cognitive:  Attention  Communication  Memory    Psychosocial:    Social Interaction  Habits  Routines  Rituals  Group Participation     Treatment Options [] LOW: Limited options  [] MODERATE: Several options  [x] HIGH: Multiple options      Decision Making/  Complexity Score: moderate       The following goals were discussed with the patient and patient is in agreement with them as to be addressed in the treatment plan.     Goals:    Short Term Goals (6 weeks) Status When Last Assessed  Progressing/ Met   1) Pt will complete initial HEP with Min A  TBA  progressing 6/8/2023    2) PT will increase shoulder flexion and abduction range of motion by 8 degrees for increased ability for overhead reaching.  Shoulder flex 0-180 108 attempts to lift higher by going into abduction    Shoulder Abd 0-180 120 - min leaning to left side     progressing 6/8/2023    3) Bed mobility / Mod I  Min A  progressing 6/8/2023    4) Left and right  strength to improve by 5 lbs to increase independence with IADL L=33  R=21 progressing 6/8/2023    5) Upper + Lower body dressing / Mod I  Mod A  progressing 6/8/2023    6) Pt will complete bathing, seated, with supervision  Mod A  progressing 6/8/2023        LongTerm Goals (12 weeks) Status When Last Assessed  Progressing/ Met   1) Pt will increase FMC as evidenced by a 10 second time decrease with bilateral upper extremities during 9 Hole peg test  L= 58s  R=1.36s progressing 6/8/2023    2) Pt will improve right shoulder flexion by 20 degrees for increased independence with overhead reaching.  Shoulder flex 0-180 108 attempts to lift higher by going into abduction    Shoulder Abd 0-180 120 - min leaning to left side     progressing 6/8/2023    3) Left and right  strength to improve by 8 lbs to increase independence with IADL L=33  R=21 progressing 6/8/2023    4) Right shoulder strength to improve to 4/5 to increase independence with IADL participation 3-/5 progressing 6/8/2023    5) left and right upper extremity gross motor coordination (GMC) to improve as evidenced by 10 block increase bilaterally during based on a Box & Block to increase independence with ADL. L=18  R=10 progressing 6/8/2023    6) Will self report completion of IADL of  choice Pt reported she wanted to cook, clean, or do laundry progressing 6/8/2023        Plan   Certification Period/Plan of care expiration: 6/8/2023 to 9/1/2023.    Outpatient Occupational Therapy 2 times weekly for 12 weeks to include the following interventions: Manual Therapy, Neuromuscular Re-ed, Patient Education, Self Care, Therapeutic Activities, and Therapeutic Exercise.    Michelle Vera MS, OTR/L

## 2023-06-13 ENCOUNTER — CLINICAL SUPPORT (OUTPATIENT)
Dept: REHABILITATION | Facility: HOSPITAL | Age: 63
End: 2023-06-13
Payer: MEDICARE

## 2023-06-13 DIAGNOSIS — Z78.9 DECREASED INDEPENDENCE WITH ACTIVITIES OF DAILY LIVING: ICD-10-CM

## 2023-06-13 DIAGNOSIS — Z74.09 IMPAIRED FUNCTIONAL MOBILITY, BALANCE, AND ENDURANCE: ICD-10-CM

## 2023-06-13 DIAGNOSIS — R53.1 DECREASED STRENGTH: Primary | ICD-10-CM

## 2023-06-13 DIAGNOSIS — Z78.9 ALTERATION IN INSTRUMENTAL ACTIVITIES OF DAILY LIVING (IADL): ICD-10-CM

## 2023-06-13 DIAGNOSIS — M25.60 DECREASED RANGE OF MOTION: ICD-10-CM

## 2023-06-13 PROCEDURE — 97530 THERAPEUTIC ACTIVITIES: CPT | Mod: PN

## 2023-06-13 NOTE — PROGRESS NOTES
5/13/23 0913  Attempt assessment with patient for outpatient case management. No answer. No voice mailbox available to leave message. RN OPCM 3rd assessment attempt. In basket message sent to Dr. Ren informing her that OPCM RN case manager has been unsuccessful x3 attempts at reaching  for enrollment and that her case has been closed. OPCM program brochure with RN case  information and [facility] brochure mailed to . CASE COSED.  Oanh Serra RN

## 2023-06-13 NOTE — PROGRESS NOTES
[facility]  Occupational Therapy Treatment Note     Date: 6/13/2023  Name: Steff Johnson  Clinic Number: [id number]    Therapy Diagnosis:   Encounter Diagnoses   Name Primary?    Decreased strength Yes    Decreased range of motion     Decreased independence with activities of daily living     Impaired functional mobility, balance, and endurance     Alteration in instrumental activities of daily living (IADL)      Physician: Vilma Rodriguez MD    Physician Orders: Eval and Treat  Medical Diagnosis: I63.9 (ICD-10-CM) - Ischemic stroke  Evaluation Date: 6/8/2023  Insurance Authorization Period Expiration: 7/6/2023  Plan of Care Certification Period: 9/1/2023  Date of Return to MD: to be determined   Visit # / Visits authorized: 1 / 1  FOTO: to be assessed     Precautions:  Standard, Fall, COPM, Diabetes    Time In: 2:01 PM   Time Out: 2:30 PM   Total Billable Time: 29  minutes (pt arrived late)     Subjective     Pt reports: No complaints today   She was not compliant with home exercise program given last session. (Not given)  Response to previous treatment:1st treatment   Functional change: no changes per pt report since evaluation     Pain: 0/10  Location:    Objective     Objective Measures updated at progress report unless specified.    Treatment     Steff received the treatments listed below:      therapeutic exercises to develop strength, endurance, and ROM for 0 minutes including: NA     neuromuscular re-education activities to improve: Balance and Coordination for 0 minutes. The following activities were included:  NA    therapeutic activities to improve functional performance for 29 minutes, including:    Max verbal cues and min A to get ready for transfer (lock breaks, scoot to edge of chair, lean forward, place hands on arm rests to push up)    Sit <> stand x3 / Min A     Stand pivot transfer / Min to mod A. Decreased ability to  her feet to pivot. Did not reach back  to the mat or wheelchair to sit.     6 hand grippers of varying resistances for increased  strength. 10x each gripper, each hand.     Serial opposition of digits (right hand) 3x each digit     Tip pinch / 3 point pinch with right hand and  oval stones transferring them to table to their container. Max verbal cues to lift arm up after placing stones in container as her hand would catch the side of the container. Mod verbal cues for posture during this activity     Patient Education and Home Exercises     Education provided:   - Progress towards goals     Written Home Exercises Provided: not given today      Assessment     Pt would continue to benefit from skilled OT.  She presented with difficulty setting up for transfer. Decreased ability to  feet to pivot during stand pivot. Completed hand strengthening with difficulty fully closing last two grippers given. Difficulty lifting right arm high enough to not hit the stones container without verbal cues. Mod verbal cues to maintain upright posture during coordination activity.     Steff is progressing well towards her goals and there are no updates to goals at this time. Pt prognosis is Good.     Pt will continue to benefit from skilled outpatient occupational therapy to address the deficits listed in the problem list on initial evaluation provide pt/family education and to maximize pt's level of independence in the home and community environment.     Pt's spiritual, cultural and educational needs considered and pt agreeable to plan of care and goals.    Anticipated barriers to occupational therapy: communication; does not drive     Goals:     Short Term Goals (6 weeks) Status When Last Assessed  Progressing/ Met   1) Pt will complete initial HEP with Min A  TBA  (progressing 6/13/2023)   2) PT will increase shoulder flexion and abduction range of motion by 8 degrees for increased ability for overhead reaching.  Shoulder flex 0-180 108 attempts to lift  higher by going into abduction    Shoulder Abd 0-180 120 - min leaning to left side     (progressing 6/13/2023)   3) Bed mobility / Mod I  Min A  progressing 6/8/2023    4) Left and right  strength to improve by 5 lbs to increase independence with IADL L=33  R=21 (progressing 6/13/2023)   5) Upper + Lower body dressing / Mod I  Mod A  (progressing 6/13/2023)   6) Pt will complete bathing, seated, with supervision  Mod A  (progressing 6/13/2023)         LongTerm Goals (12 weeks) Status When Last Assessed  Progressing/ Met   1) Pt will increase FMC as evidenced by a 10 second time decrease with bilateral upper extremities during 9 Hole peg test  L= 58s  R=1.36s (progressing 6/13/2023)   2) Pt will improve right shoulder flexion by 20 degrees for increased independence with overhead reaching.  Shoulder flex 0-180 108 attempts to lift higher by going into abduction    Shoulder Abd 0-180 120 - min leaning to left side     (progressing 6/13/2023)   3) Left and right  strength to improve by 8 lbs to increase independence with IADL L=33  R=21 (progressing 6/13/2023)   4) Right shoulder strength to improve to 4/5 to increase independence with IADL participation 3-/5 (progressing 6/13/2023)   5) left and right upper extremity gross motor coordination (GMC) to improve as evidenced by 10 block increase bilaterally during based on a Box & Block to increase independence with ADL. L=18  R=10 (progressing 6/13/2023)    6) Will self report completion of IADL of choice with Min A  Pt reported she wanted to cook, clean, or do laundry (progressing 6/13/2023)       Additional functional goals to be added as pt progresses and per her priorities.     Plan   Certification Period/Plan of care expiration: 6/8/2023 to 9/1/2023.     Outpatient Occupational Therapy 2 times weekly for 12 weeks to include the following interventions: Manual Therapy, Neuromuscular Re-ed, Patient Education, Self Care, Therapeutic Activities, and Therapeutic  Exercise.    Updates/Grading for next session: coordination, transfers    Michelle Vera, OT

## 2023-06-15 ENCOUNTER — LAB VISIT (OUTPATIENT)
Dept: LAB | Facility: HOSPITAL | Age: 63
End: 2023-06-15
Attending: INTERNAL MEDICINE
Payer: MEDICARE

## 2023-06-15 ENCOUNTER — HOSPITAL ENCOUNTER (OUTPATIENT)
Facility: HOSPITAL | Age: 63
Discharge: HOME | End: 2023-06-16
Attending: EMERGENCY MEDICINE | Admitting: INTERNAL MEDICINE
Payer: MEDICARE

## 2023-06-15 DIAGNOSIS — N17.9 AKI (ACUTE KIDNEY INJURY): Primary | ICD-10-CM

## 2023-06-15 DIAGNOSIS — Z74.09 IMPAIRED FUNCTIONAL MOBILITY, BALANCE, AND ENDURANCE: ICD-10-CM

## 2023-06-15 DIAGNOSIS — R07.9 CHEST PAIN: ICD-10-CM

## 2023-06-15 DIAGNOSIS — N17.9 ACUTE KIDNEY FAILURE, UNSPECIFIED: Primary | ICD-10-CM

## 2023-06-15 DIAGNOSIS — E16.2 HYPOGLYCEMIA: ICD-10-CM

## 2023-06-15 PROBLEM — E86.0 DEHYDRATION: Status: ACTIVE | Noted: 2023-06-15

## 2023-06-15 LAB
ALBUMIN SERPL BCP-MCNC: 3.5 G/DL (ref 3.5–5.2)
ALBUMIN SERPL BCP-MCNC: 4.2 G/DL (ref 3.5–5.2)
ALP SERPL-CCNC: 72 U/L (ref 55–135)
ALT SERPL W/O P-5'-P-CCNC: 28 U/L (ref 10–44)
ANION GAP SERPL CALC-SCNC: 11 MMOL/L (ref 8–16)
ANION GAP SERPL CALC-SCNC: 11 MMOL/L (ref 8–16)
AST SERPL-CCNC: 25 U/L (ref 10–40)
BASOPHILS # BLD AUTO: 0.06 K/UL (ref 0–0.2)
BASOPHILS # BLD AUTO: 0.06 K/UL (ref 0–0.2)
BASOPHILS NFR BLD: 0.5 % (ref 0–1.9)
BASOPHILS NFR BLD: 0.7 % (ref 0–1.9)
BILIRUB SERPL-MCNC: 0.8 MG/DL (ref 0.1–1)
BILIRUB UR QL STRIP: NEGATIVE
BNP SERPL-MCNC: 9 PG/ML (ref 0–99)
BUN SERPL-MCNC: 26 MG/DL (ref 8–23)
BUN SERPL-MCNC: 31 MG/DL (ref 8–23)
CALCIUM SERPL-MCNC: 9.2 MG/DL (ref 8.7–10.5)
CALCIUM SERPL-MCNC: 9.3 MG/DL (ref 8.7–10.5)
CHLORIDE SERPL-SCNC: 109 MMOL/L (ref 95–110)
CHLORIDE SERPL-SCNC: 109 MMOL/L (ref 95–110)
CK SERPL-CCNC: 114 U/L (ref 20–180)
CLARITY UR: CLEAR
CO2 SERPL-SCNC: 21 MMOL/L (ref 23–29)
CO2 SERPL-SCNC: 21 MMOL/L (ref 23–29)
COLOR UR: YELLOW
CREAT SERPL-MCNC: 1.4 MG/DL (ref 0.5–1.4)
CREAT SERPL-MCNC: 1.7 MG/DL (ref 0.5–1.4)
CRP SERPL-MCNC: 0.65 MG/DL
DIFFERENTIAL METHOD: ABNORMAL
DIFFERENTIAL METHOD: ABNORMAL
EOSINOPHIL # BLD AUTO: 0.1 K/UL (ref 0–0.5)
EOSINOPHIL # BLD AUTO: 0.1 K/UL (ref 0–0.5)
EOSINOPHIL NFR BLD: 0.7 % (ref 0–8)
EOSINOPHIL NFR BLD: 0.8 % (ref 0–8)
ERYTHROCYTE [DISTWIDTH] IN BLOOD BY AUTOMATED COUNT: 14.7 % (ref 11.5–14.5)
ERYTHROCYTE [DISTWIDTH] IN BLOOD BY AUTOMATED COUNT: 15.6 % (ref 11.5–14.5)
EST. GFR  (NO RACE VARIABLE): 33.1 ML/MIN/1.73 M^2
EST. GFR  (NO RACE VARIABLE): 42 ML/MIN/1.73 M^2
GLUCOSE SERPL-MCNC: 100 MG/DL (ref 70–110)
GLUCOSE SERPL-MCNC: 192 MG/DL (ref 70–110)
GLUCOSE SERPL-MCNC: 33 MG/DL (ref 70–110)
GLUCOSE SERPL-MCNC: 37 MG/DL (ref 70–110)
GLUCOSE SERPL-MCNC: 85 MG/DL (ref 70–110)
GLUCOSE SERPL-MCNC: 96 MG/DL (ref 70–110)
GLUCOSE UR QL STRIP: NEGATIVE
HCT VFR BLD AUTO: 31.8 % (ref 37–48.5)
HCT VFR BLD AUTO: 33.1 % (ref 37–48.5)
HGB BLD-MCNC: 11 G/DL (ref 12–16)
HGB BLD-MCNC: 11.8 G/DL (ref 12–16)
HGB UR QL STRIP: NEGATIVE
IMM GRANULOCYTES # BLD AUTO: 0.03 K/UL (ref 0–0.04)
IMM GRANULOCYTES # BLD AUTO: 0.05 K/UL (ref 0–0.04)
IMM GRANULOCYTES NFR BLD AUTO: 0.3 % (ref 0–0.5)
IMM GRANULOCYTES NFR BLD AUTO: 0.4 % (ref 0–0.5)
KETONES UR QL STRIP: NEGATIVE
LACTATE SERPL-SCNC: 1.4 MMOL/L (ref 0.5–1.9)
LEUKOCYTE ESTERASE UR QL STRIP: NEGATIVE
LYMPHOCYTES # BLD AUTO: 1.8 K/UL (ref 1–4.8)
LYMPHOCYTES # BLD AUTO: 2.1 K/UL (ref 1–4.8)
LYMPHOCYTES NFR BLD: 18.4 % (ref 18–48)
LYMPHOCYTES NFR BLD: 20.4 % (ref 18–48)
MAGNESIUM SERPL-MCNC: 2.2 MG/DL (ref 1.6–2.6)
MCH RBC QN AUTO: 27.6 PG (ref 27–31)
MCH RBC QN AUTO: 28.6 PG (ref 27–31)
MCHC RBC AUTO-ENTMCNC: 34.6 G/DL (ref 32–36)
MCHC RBC AUTO-ENTMCNC: 35.6 G/DL (ref 32–36)
MCV RBC AUTO: 80 FL (ref 82–98)
MCV RBC AUTO: 80 FL (ref 82–98)
MONOCYTES # BLD AUTO: 0.6 K/UL (ref 0.3–1)
MONOCYTES # BLD AUTO: 1.4 K/UL (ref 0.3–1)
MONOCYTES NFR BLD: 12.7 % (ref 4–15)
MONOCYTES NFR BLD: 6.3 % (ref 4–15)
NEUTROPHILS # BLD AUTO: 6.3 K/UL (ref 1.8–7.7)
NEUTROPHILS # BLD AUTO: 7.5 K/UL (ref 1.8–7.7)
NEUTROPHILS NFR BLD: 67.2 % (ref 38–73)
NEUTROPHILS NFR BLD: 71.6 % (ref 38–73)
NITRITE UR QL STRIP: NEGATIVE
NRBC BLD-RTO: 0 /100 WBC
NRBC BLD-RTO: 0 /100 WBC
PH UR STRIP: 6 [PH] (ref 5–8)
PHOSPHATE SERPL-MCNC: 4.6 MG/DL (ref 2.7–4.5)
PLATELET # BLD AUTO: 455 K/UL (ref 150–450)
PLATELET # BLD AUTO: 474 K/UL (ref 150–450)
PMV BLD AUTO: 10.5 FL (ref 9.2–12.9)
PMV BLD AUTO: 10.8 FL (ref 9.2–12.9)
POTASSIUM SERPL-SCNC: 3.4 MMOL/L (ref 3.5–5.1)
POTASSIUM SERPL-SCNC: 3.8 MMOL/L (ref 3.5–5.1)
PROT SERPL-MCNC: 8.3 G/DL (ref 6–8.4)
PROT UR QL STRIP: ABNORMAL
RBC # BLD AUTO: 3.98 M/UL (ref 4–5.4)
RBC # BLD AUTO: 4.13 M/UL (ref 4–5.4)
SODIUM SERPL-SCNC: 141 MMOL/L (ref 136–145)
SODIUM SERPL-SCNC: 141 MMOL/L (ref 136–145)
SP GR UR STRIP: 1.01 (ref 1–1.03)
TROPONIN I SERPL HS-MCNC: 2.7 PG/ML (ref 0–14.9)
TROPONIN I SERPL HS-MCNC: 3.4 PG/ML (ref 0–14.9)
URN SPEC COLLECT METH UR: ABNORMAL
UROBILINOGEN UR STRIP-ACNC: NEGATIVE EU/DL
WBC # BLD AUTO: 11.22 K/UL (ref 3.9–12.7)
WBC # BLD AUTO: 8.82 K/UL (ref 3.9–12.7)

## 2023-06-15 PROCEDURE — 81003 URINALYSIS AUTO W/O SCOPE: CPT | Performed by: EMERGENCY MEDICINE

## 2023-06-15 PROCEDURE — 96361 HYDRATE IV INFUSION ADD-ON: CPT

## 2023-06-15 PROCEDURE — 86140 C-REACTIVE PROTEIN: CPT | Performed by: EMERGENCY MEDICINE

## 2023-06-15 PROCEDURE — 83735 ASSAY OF MAGNESIUM: CPT | Performed by: NURSE PRACTITIONER

## 2023-06-15 PROCEDURE — 80069 RENAL FUNCTION PANEL: CPT | Performed by: INTERNAL MEDICINE

## 2023-06-15 PROCEDURE — 83880 ASSAY OF NATRIURETIC PEPTIDE: CPT | Performed by: STUDENT IN AN ORGANIZED HEALTH CARE EDUCATION/TRAINING PROGRAM

## 2023-06-15 PROCEDURE — 83605 ASSAY OF LACTIC ACID: CPT | Performed by: STUDENT IN AN ORGANIZED HEALTH CARE EDUCATION/TRAINING PROGRAM

## 2023-06-15 PROCEDURE — 80053 COMPREHEN METABOLIC PANEL: CPT | Performed by: NURSE PRACTITIONER

## 2023-06-15 PROCEDURE — 82962 GLUCOSE BLOOD TEST: CPT | Mod: 91

## 2023-06-15 PROCEDURE — 93005 ELECTROCARDIOGRAM TRACING: CPT | Performed by: SPECIALIST

## 2023-06-15 PROCEDURE — 25000003 PHARM REV CODE 250: Performed by: STUDENT IN AN ORGANIZED HEALTH CARE EDUCATION/TRAINING PROGRAM

## 2023-06-15 PROCEDURE — 85025 COMPLETE CBC W/AUTO DIFF WBC: CPT | Performed by: INTERNAL MEDICINE

## 2023-06-15 PROCEDURE — 93010 EKG 12-LEAD: ICD-10-PCS | Mod: ,,, | Performed by: SPECIALIST

## 2023-06-15 PROCEDURE — 84484 ASSAY OF TROPONIN QUANT: CPT | Performed by: EMERGENCY MEDICINE

## 2023-06-15 PROCEDURE — 82550 ASSAY OF CK (CPK): CPT | Performed by: EMERGENCY MEDICINE

## 2023-06-15 PROCEDURE — 96360 HYDRATION IV INFUSION INIT: CPT

## 2023-06-15 PROCEDURE — G0378 HOSPITAL OBSERVATION PER HR: HCPCS

## 2023-06-15 PROCEDURE — 36415 COLL VENOUS BLD VENIPUNCTURE: CPT | Performed by: EMERGENCY MEDICINE

## 2023-06-15 PROCEDURE — 99285 EMERGENCY DEPT VISIT HI MDM: CPT | Mod: 25

## 2023-06-15 PROCEDURE — 25000003 PHARM REV CODE 250: Performed by: NURSE PRACTITIONER

## 2023-06-15 PROCEDURE — 36415 COLL VENOUS BLD VENIPUNCTURE: CPT | Performed by: INTERNAL MEDICINE

## 2023-06-15 PROCEDURE — 93010 ELECTROCARDIOGRAM REPORT: CPT | Mod: ,,, | Performed by: SPECIALIST

## 2023-06-15 PROCEDURE — 63600175 PHARM REV CODE 636 W HCPCS: Performed by: STUDENT IN AN ORGANIZED HEALTH CARE EDUCATION/TRAINING PROGRAM

## 2023-06-15 PROCEDURE — 85025 COMPLETE CBC W/AUTO DIFF WBC: CPT | Mod: 91 | Performed by: NURSE PRACTITIONER

## 2023-06-15 RX ORDER — HYDRALAZINE HYDROCHLORIDE 25 MG/1
100 TABLET, FILM COATED ORAL ONCE
Status: COMPLETED | OUTPATIENT
Start: 2023-06-15 | End: 2023-06-15

## 2023-06-15 RX ORDER — AMLODIPINE BESYLATE 5 MG/1
10 TABLET ORAL
Status: COMPLETED | OUTPATIENT
Start: 2023-06-15 | End: 2023-06-15

## 2023-06-15 RX ORDER — LOSARTAN POTASSIUM 25 MG/1
50 TABLET ORAL ONCE
Status: DISCONTINUED | OUTPATIENT
Start: 2023-06-15 | End: 2023-06-15

## 2023-06-15 RX ADMIN — SODIUM CHLORIDE, SODIUM LACTATE, POTASSIUM CHLORIDE, AND CALCIUM CHLORIDE 1000 ML: .6; .31; .03; .02 INJECTION, SOLUTION INTRAVENOUS at 05:06

## 2023-06-15 RX ADMIN — AMLODIPINE BESYLATE 10 MG: 5 TABLET ORAL at 11:06

## 2023-06-15 RX ADMIN — DEXTROSE MONOHYDRATE 25 G: 25 INJECTION, SOLUTION INTRAVENOUS at 05:06

## 2023-06-15 RX ADMIN — HYDRALAZINE HYDROCHLORIDE 100 MG: 25 TABLET ORAL at 09:06

## 2023-06-15 NOTE — ED NOTES
Pt has preexisting right sided weakness d/t previous stroke. Pt family member says the weakness has not increased.

## 2023-06-15 NOTE — PHARMACY MED REC
"        Admission Medication History     The home medication history was taken by Yana Allison.    You may go to "Admission" then "Reconcile Home Medications" tabs to review and/or act upon these items.     The home medication list has been updated by the Pharmacy department.   Please read ALL comments highlighted in yellow.   Please address this information as you see fit.    Feel free to contact us if you have any questions or require assistance.        Medications listed below were obtained from: Patient/family and Analytic software- [employer]  No current facility-administered medications on file prior to encounter.     Current Outpatient Medications on File Prior to Encounter   Medication Sig Dispense Refill    amLODIPine (NORVASC) 10 MG tablet Take 1 tablet (10 mg total) by mouth once daily. 90 tablet 3    aspirin 81 MG Chew Take 1 tablet (81 mg total) by mouth once daily. 30 tablet 0    atorvastatin (LIPITOR) 80 MG tablet Take 1 tablet (80 mg total) by mouth every evening. 30 tablet 0    chlorthalidone (HYGROTEN) 50 MG Tab Take 1 tablet (50 mg total) by mouth once daily. 90 tablet 3    hydrALAZINE (APRESOLINE) 100 MG tablet Take 1 tablet (100 mg total) by mouth 3 (three) times daily. 270 tablet 1    insulin aspart U-100 (NOVOLOG) 100 unit/mL injection Inject 12 Units into the skin 3 (three) times daily before meals. 10.8 mL 11    insulin degludec (TRESIBA FLEXTOUCH U-100) 100 unit/mL (3 mL) insulin pen ADMINISTER 40 UNITS UNDER THE SKIN EVERY EVENING (Patient taking differently: Inject 40 Units into the skin every evening. ADMINISTER 40 UNITS UNDER THE SKIN EVERY EVENING) 12 pen 1    losartan (COZAAR) 100 MG tablet Take 1 tablet (100 mg total) by mouth once daily. 90 tablet 3    metoprolol succinate (TOPROL-XL) 100 MG 24 hr tablet Take 1 tablet (100 mg total) by mouth once daily. 90 tablet 1    dexAMETHasone (OZURDEX) 0.7 mg Impl intravitreal implant 0.7 mg by Intravitreal route once.      pen needle, diabetic " "(BD ULTRA-FINE SHORT PEN NEEDLE) 31 gauge x 5/16" Ndle 1 pen by Misc.(Non-Drug; Combo Route) route 4 (four) times daily. 300 each 5    tiotropium (SPIRIVA) 18 mcg inhalation capsule Inhale 1 capsule (18 mcg total) into the lungs once daily. Controller 90 capsule 3    [DISCONTINUED] blood-glucose meter (TRUE METRIX GLUCOSE METER) kit Use as instructed 1 each 0    [DISCONTINUED] lancing device (TRUEDRAW LANCING DEVICE) Misc Inject 1 Device into the skin 2 (two) times daily. 1 each 3         Yana Allison  [id number]          .        "

## 2023-06-15 NOTE — ED PROVIDER NOTES
Encounter Date: 6/15/2023       History     Chief Complaint   Patient presents with    Hypoglycemia     Hypoglycemia today, 45 at home     Patient is a 63 year old woman with a history of stroke x3, hypertension, COPD, diabetes presenting via EMS for evaluation of hypoglycemia.  This history obtained mostly via EMS and through discussions with patient's , who is her primary caretaker.  Patient's  states that she has been well for the last few days, with the exception of some mildly decreased p.o. intake.  No recent vomiting.   states that he noticed that her blood glucose was a little bit low this morning.  He states that her blood glucose continued to drop from 75 and then to less than 60.  At that time she was altered and so he called EMS.   states that with administration of D50 here, the patient's mental status is back to baseline.    Patient slurs her speech at baseline ever since her strokes.   confirms this.  Patient is able to answer yes and no questions and follow commands.  She also is able to converse normally with her .  She denies headache, fever, chest pain, shortness of breath, nausea vomiting, diarrhea.      Review of patient's allergies indicates:  No Known Allergies  Past Medical History:   Diagnosis Date    Arthritis     Complete tear of left rotator cuff 11/09/2017    COPD (chronic obstructive pulmonary disease)     COVID-19 infection 07/02/2021    Diabetes mellitus     H/o Cerebrovascular accident (CVA) of left pontine structure 12/12/2019    Hypertension     Personal history of colonic polyps     Stroke     Wears glasses      Past Surgical History:   Procedure Laterality Date    COLONOSCOPY N/A 4/11/2017    Procedure: COLONOSCOPY;  Surgeon: Jared Antonio MD;  Location: [facility];  Service: Endoscopy;  Laterality: N/A;    HYSTERECTOMY      OOPHORECTOMY      SHOULDER SURGERY      R    TRANSESOPHAGEAL ECHOCARDIOGRAM WITH POSSIBLE CARDIOVERSION (GT W/  POSS CARDIOVERSION) N/A 5/4/2023    Procedure: Transesophageal echo (GT) intra-procedure log documentation;  Surgeon: Blade Phillip MD;  Location: [facility] CATH/EP LAB;  Service: Cardiology;  Laterality: N/A;     Family History   Problem Relation Age of Onset    Diabetes Mother     Asthma Mother     Hypertension Mother     Diabetes Father     Diabetes Brother     Hypertension Brother     Anemia Daughter     Glaucoma Neg Hx     Macular degeneration Neg Hx     Retinal detachment Neg Hx      Social History     Tobacco Use    Smoking status: Never    Smokeless tobacco: Never   Substance Use Topics    Alcohol use: No    Drug use: No     Review of Systems   Constitutional:  Positive for appetite change. Negative for fever.   HENT:  Negative for sore throat.    Eyes:  Negative for visual disturbance.   Respiratory:  Negative for shortness of breath.    Cardiovascular:  Negative for chest pain.   Gastrointestinal:  Negative for abdominal pain, diarrhea, nausea and vomiting.   Genitourinary:  Negative for difficulty urinating and dysuria.   Musculoskeletal:  Negative for back pain and gait problem.   Skin:  Negative for rash.   Neurological:  Negative for headaches.        Baseline weakness   Hematological:  Does not bruise/bleed easily.     Physical Exam     Initial Vitals [06/15/23 1647]   BP Pulse Resp Temp SpO2   (!) 154/71 74 16 97.8 °F (36.6 °C) 97 %      MAP       --         Physical Exam    Constitutional: She appears well-developed and well-nourished. She is not diaphoretic. No distress.   HENT:   Head: Normocephalic and atraumatic.   Eyes: Conjunctivae and EOM are normal. Pupils are equal, round, and reactive to light.   Neck: Neck supple. No JVD present.   Normal range of motion.  Cardiovascular:  Normal rate and regular rhythm.           Pulmonary/Chest: Breath sounds normal. No respiratory distress.   Abdominal: Abdomen is soft. She exhibits no distension. There is no abdominal tenderness. There is no rebound  and no guarding.   Musculoskeletal:         General: No tenderness or edema. Normal range of motion.      Cervical back: Normal range of motion and neck supple.     Neurological: She is alert and oriented to person, place, and time. A cranial nerve deficit is present. GCS score is 15. GCS eye subscore is 4. GCS verbal subscore is 5. GCS motor subscore is 6.   Right facial droop per baseline  Dysarthria per baseline  RUE and RLE weakness per baseline   Skin: Skin is warm and dry.   Psychiatric: She has a normal mood and affect.       ED Course   Procedures  Labs Reviewed   CBC W/ AUTO DIFFERENTIAL - Abnormal; Notable for the following components:       Result Value    Hemoglobin 11.8 (*)     Hematocrit 33.1 (*)     MCV 80 (*)     RDW 15.6 (*)     Platelets 455 (*)     Immature Grans (Abs) 0.05 (*)     Mono # 1.4 (*)     All other components within normal limits   COMPREHENSIVE METABOLIC PANEL - Abnormal; Notable for the following components:    Potassium 3.4 (*)     CO2 21 (*)     Glucose 33 (*)     BUN 31 (*)     Creatinine 1.7 (*)     eGFR 33.1 (*)     All other components within normal limits    Narrative:     Glu critical result(s) repeated. Called and verbal readback obtained   from Rita Kirkland RN/ed by JOE 06/15/2023 18:01   URINALYSIS, REFLEX TO URINE CULTURE - Abnormal; Notable for the following components:    Protein, UA Trace (*)     All other components within normal limits    Narrative:     Specimen Source->Urine   POCT GLUCOSE - Abnormal; Notable for the following components:    POC Glucose 37 (*)     All other components within normal limits   POCT GLUCOSE - Abnormal; Notable for the following components:    POC Glucose 192 (*)     All other components within normal limits   MAGNESIUM   MAGNESIUM   LACTIC ACID, PLASMA   TROPONIN I HIGH SENSITIVITY   CK   TROPONIN I HIGH SENSITIVITY   C-REACTIVE PROTEIN   B-TYPE NATRIURETIC PEPTIDE   TROPONIN I HIGH SENSITIVITY   CK   POCT GLUCOSE   POCT GLUCOSE         ECG Results              EKG 12-lead (In process)  Result time 06/16/23 05:23:43      In process by Interface, Lab In [facility] (06/16/23 05:23:43)                   Narrative:    Test Reason : E16.2,    Vent. Rate : 066 BPM     Atrial Rate : 066 BPM     P-R Int : 144 ms          QRS Dur : 076 ms      QT Int : 434 ms       P-R-T Axes : 058 025 074 degrees     QTc Int : 454 ms    Normal sinus rhythm  Nonspecific T wave abnormality  Abnormal ECG  When compared with ECG of 04-MAY-2023 11:28,  No significant change was found    Referred By: [email or web address]   SELF           Confirmed By:                                   Imaging Results              X-Ray Chest AP Portable (Final result)  Result time 06/16/23 06:51:21      Final result by Davis Cristina Jr., MD (06/16/23 06:51:21)                   Narrative:    EXAMINATION:  XR CHEST AP PORTABLE    CLINICAL INDICATION:  Female, 63 years old. Hypoglycemia    COMPARISON:  May 2, 2023    FINDINGS:  An antegrade vessels appear normal. There is no evidence of mediastinal or hilar mass. Trachea is midline.    Hypoaeration of the lungs again noted. Otherwise the lungs are clear. No pleural effusion can be seen. No pneumothorax can be seen.    IMPRESSION:  Hypoaeration of the lungs.    There is no x-ray evidence of active or acute disease and no change as compared to May 2, 2023    Electronically signed by:  Davis Cristina Jr., MD  6/16/2023 6:51 AM CDT Workstation: [id number]                                     Medications   dextrose 50% injection 25 g (25 g Intravenous Given 6/15/23 1701)   lactated ringers bolus 1,000 mL (0 mLs Intravenous Stopped 6/15/23 2058)   hydrALAZINE tablet 100 mg ( Oral Canceled Entry 6/15/23 2215)   amLODIPine tablet 10 mg (10 mg Oral Given 6/15/23 2319)   lactated ringers bolus 1,000 mL (0 mLs Intravenous Stopped 6/16/23 0154)     Medical Decision Making:   History:   I obtained history from: EMS provider.  Old Medical Records: I decided to obtain  old medical records.  Old Records Summarized: records from clinic visits and records from previous admission(s).  Initial Assessment:   63-year-old woman with a history of stroke, diabetes, hypertension presenting for hypoglycemia.  Blood glucose 37 on arrival, improved to 190 with 2 amps of D50 (1 amp was given via EMS, 1 amp was given here).  Vital signs are within acceptable limits for this patient with high blood pressure.  Physical exam with baseline right-sided weakness.  Note that patient's , who is her caretaker, states that she has had decreased p.o. intake in the last day or so and has been using her normal amount of insulin.  Differential Diagnosis:   Hypoglycemia, COURTNEY, electrolyte abnormality, sepsis/infection, urinary tract infection, ACS  Independently Interpreted Test(s):   I have ordered and independently interpreted X-rays - see summary below.       <> Summary of X-Ray Reading(s): Volume overload.  Clinical Tests:   Lab Tests: Ordered and Reviewed  The following lab test(s) were unremarkable: CBC, BNP, Urinalysis, CMP and Lactate  Radiological Study: Ordered and Reviewed  Medical Tests: Ordered and Reviewed  ED Management:  With 1 L of LR and 2 amps of D50, patient has sustained a blood glucose greater than 100 over the last 4 hours.  Labs show an COURTNEY with a creatinine 1.7, which is 0.4 above her baseline of 1.3.  Present time patient has been in the emergency department, her blood pressure kacie from 150 systolic to 220.  This is likely secondary to the volume that we gave her and not taking her home meds.  Gave her a dose of hydralazine 100 and her blood pressure reduced to systolic 200.     Consulted Medicine for admission.  Other:   I have discussed this case with another health care provider.          Attending Attestation:   Physician Attestation Statement for Resident:  As the supervising MD   Physician Attestation Statement: I have personally seen and examined this patient.   I agree  with the above history.  -:   As the supervising MD I agree with the above PE.     As the supervising MD I agree with the above treatment, course, plan, and disposition.    I have reviewed and agree with the residents interpretation of the following: lab data, CT scans, x-rays and EKG.               ED Course as of 06/17/23 1600   u Marco Antonio 15, 2023   1802 Creatinine(!): 1.7  Baseline 1.3 [EL]   1802 Potassium(!): 3.4 [EL]   1802 POC Glucose(!): 192  Initial was 37 [EL]   1803 Magnesium: 2.2 [EL]   1914 POC Glucose: 100 [EL]   1934 Lactate, Saqib: 1.4 [EL]   2023 Urinalysis, Reflex to Urine Culture Urine, Clean Catch(!)  Normal [EL]   2103 POC Glucose: 96 [EL]   2124 Troponin I High Sensitivity: 3.4 [EL]   2148 BNP: 9 [EL]      ED Course User Index  [EL] Teetee España MD                   Clinical Impression:   Final diagnoses:  [E16.2] Hypoglycemia  [N17.9] COURTNEY (acute kidney injury) (Primary)        ED Disposition Condition    Observation                 Teetee España MD  Resident  06/15/23 2236       Toyin Early MD  06/17/23 1600

## 2023-06-16 ENCOUNTER — TELEPHONE (OUTPATIENT)
Dept: FAMILY MEDICINE | Facility: CLINIC | Age: 63
End: 2023-06-16
Payer: MEDICARE

## 2023-06-16 VITALS
HEIGHT: 59 IN | RESPIRATION RATE: 16 BRPM | BODY MASS INDEX: 26.62 KG/M2 | HEART RATE: 72 BPM | OXYGEN SATURATION: 98 % | WEIGHT: 132.06 LBS | DIASTOLIC BLOOD PRESSURE: 62 MMHG | SYSTOLIC BLOOD PRESSURE: 147 MMHG | TEMPERATURE: 98 F

## 2023-06-16 PROBLEM — E86.0 DEHYDRATION: Status: RESOLVED | Noted: 2023-06-15 | Resolved: 2023-06-16

## 2023-06-16 PROBLEM — E16.2 HYPOGLYCEMIA: Status: RESOLVED | Noted: 2023-06-15 | Resolved: 2023-06-16

## 2023-06-16 PROBLEM — R47.01 APHASIA: Status: RESOLVED | Noted: 2023-05-31 | Resolved: 2023-06-16

## 2023-06-16 PROBLEM — I16.0 HYPERTENSIVE URGENCY: Status: RESOLVED | Noted: 2021-03-31 | Resolved: 2023-06-16

## 2023-06-16 PROBLEM — N17.9 AKI (ACUTE KIDNEY INJURY): Status: RESOLVED | Noted: 2022-01-05 | Resolved: 2023-06-16

## 2023-06-16 LAB
ANION GAP SERPL CALC-SCNC: 7 MMOL/L (ref 8–16)
BASOPHILS # BLD AUTO: 0.05 K/UL (ref 0–0.2)
BASOPHILS NFR BLD: 0.5 % (ref 0–1.9)
BUN SERPL-MCNC: 21 MG/DL (ref 8–23)
CALCIUM SERPL-MCNC: 8.8 MG/DL (ref 8.7–10.5)
CHLORIDE SERPL-SCNC: 109 MMOL/L (ref 95–110)
CO2 SERPL-SCNC: 22 MMOL/L (ref 23–29)
CREAT SERPL-MCNC: 1.2 MG/DL (ref 0.5–1.4)
DIFFERENTIAL METHOD: ABNORMAL
EOSINOPHIL # BLD AUTO: 0.1 K/UL (ref 0–0.5)
EOSINOPHIL NFR BLD: 0.8 % (ref 0–8)
ERYTHROCYTE [DISTWIDTH] IN BLOOD BY AUTOMATED COUNT: 14.9 % (ref 11.5–14.5)
EST. GFR  (NO RACE VARIABLE): 50.2 ML/MIN/1.73 M^2
GLUCOSE SERPL-MCNC: 158 MG/DL (ref 70–110)
GLUCOSE SERPL-MCNC: 70 MG/DL (ref 70–110)
GLUCOSE SERPL-MCNC: 81 MG/DL (ref 70–110)
HCT VFR BLD AUTO: 32.3 % (ref 37–48.5)
HGB BLD-MCNC: 11.5 G/DL (ref 12–16)
IMM GRANULOCYTES # BLD AUTO: 0.06 K/UL (ref 0–0.04)
IMM GRANULOCYTES NFR BLD AUTO: 0.6 % (ref 0–0.5)
LYMPHOCYTES # BLD AUTO: 1.7 K/UL (ref 1–4.8)
LYMPHOCYTES NFR BLD: 17.5 % (ref 18–48)
MAGNESIUM SERPL-MCNC: 1.8 MG/DL (ref 1.6–2.6)
MCH RBC QN AUTO: 28.2 PG (ref 27–31)
MCHC RBC AUTO-ENTMCNC: 35.6 G/DL (ref 32–36)
MCV RBC AUTO: 79 FL (ref 82–98)
MONOCYTES # BLD AUTO: 0.8 K/UL (ref 0.3–1)
MONOCYTES NFR BLD: 8 % (ref 4–15)
NEUTROPHILS # BLD AUTO: 7.2 K/UL (ref 1.8–7.7)
NEUTROPHILS NFR BLD: 72.6 % (ref 38–73)
NRBC BLD-RTO: 0 /100 WBC
PLATELET # BLD AUTO: 444 K/UL (ref 150–450)
PMV BLD AUTO: 10 FL (ref 9.2–12.9)
POTASSIUM SERPL-SCNC: 4.1 MMOL/L (ref 3.5–5.1)
RBC # BLD AUTO: 4.08 M/UL (ref 4–5.4)
SODIUM SERPL-SCNC: 138 MMOL/L (ref 136–145)
WBC # BLD AUTO: 9.9 K/UL (ref 3.9–12.7)

## 2023-06-16 PROCEDURE — G0378 HOSPITAL OBSERVATION PER HR: HCPCS

## 2023-06-16 PROCEDURE — 36415 COLL VENOUS BLD VENIPUNCTURE: CPT | Performed by: INTERNAL MEDICINE

## 2023-06-16 PROCEDURE — 83735 ASSAY OF MAGNESIUM: CPT | Performed by: INTERNAL MEDICINE

## 2023-06-16 PROCEDURE — 80048 BASIC METABOLIC PNL TOTAL CA: CPT | Performed by: INTERNAL MEDICINE

## 2023-06-16 PROCEDURE — 25000003 PHARM REV CODE 250: Performed by: INTERNAL MEDICINE

## 2023-06-16 PROCEDURE — 63600175 PHARM REV CODE 636 W HCPCS: Performed by: INTERNAL MEDICINE

## 2023-06-16 PROCEDURE — 94760 N-INVAS EAR/PLS OXIMETRY 1: CPT

## 2023-06-16 PROCEDURE — 96361 HYDRATE IV INFUSION ADD-ON: CPT

## 2023-06-16 PROCEDURE — 85025 COMPLETE CBC W/AUTO DIFF WBC: CPT | Performed by: INTERNAL MEDICINE

## 2023-06-16 RX ORDER — AMLODIPINE BESYLATE 5 MG/1
10 TABLET ORAL DAILY
Status: DISCONTINUED | OUTPATIENT
Start: 2023-06-16 | End: 2023-06-16 | Stop reason: HOSPADM

## 2023-06-16 RX ORDER — TALC
9 POWDER (GRAM) TOPICAL NIGHTLY PRN
Status: DISCONTINUED | OUTPATIENT
Start: 2023-06-16 | End: 2023-06-16 | Stop reason: HOSPADM

## 2023-06-16 RX ORDER — ENOXAPARIN SODIUM 100 MG/ML
30 INJECTION SUBCUTANEOUS EVERY 24 HOURS
Status: DISCONTINUED | OUTPATIENT
Start: 2023-06-16 | End: 2023-06-16 | Stop reason: HOSPADM

## 2023-06-16 RX ORDER — ACETAMINOPHEN 325 MG/1
650 TABLET ORAL EVERY 4 HOURS PRN
Status: DISCONTINUED | OUTPATIENT
Start: 2023-06-16 | End: 2023-06-16 | Stop reason: HOSPADM

## 2023-06-16 RX ORDER — PROCHLORPERAZINE EDISYLATE 5 MG/ML
5 INJECTION INTRAMUSCULAR; INTRAVENOUS EVERY 6 HOURS PRN
Status: DISCONTINUED | OUTPATIENT
Start: 2023-06-16 | End: 2023-06-16 | Stop reason: HOSPADM

## 2023-06-16 RX ORDER — MAG HYDROX/ALUMINUM HYD/SIMETH 200-200-20
30 SUSPENSION, ORAL (FINAL DOSE FORM) ORAL 4 TIMES DAILY PRN
Status: DISCONTINUED | OUTPATIENT
Start: 2023-06-16 | End: 2023-06-16 | Stop reason: HOSPADM

## 2023-06-16 RX ORDER — SIMETHICONE 80 MG
1 TABLET,CHEWABLE ORAL 4 TIMES DAILY PRN
Status: DISCONTINUED | OUTPATIENT
Start: 2023-06-16 | End: 2023-06-16 | Stop reason: HOSPADM

## 2023-06-16 RX ORDER — SODIUM CHLORIDE 0.9 % (FLUSH) 0.9 %
10 SYRINGE (ML) INJECTION EVERY 6 HOURS PRN
Status: DISCONTINUED | OUTPATIENT
Start: 2023-06-16 | End: 2023-06-16 | Stop reason: HOSPADM

## 2023-06-16 RX ORDER — NAPROXEN SODIUM 220 MG/1
81 TABLET, FILM COATED ORAL DAILY
Status: DISCONTINUED | OUTPATIENT
Start: 2023-06-16 | End: 2023-06-16 | Stop reason: HOSPADM

## 2023-06-16 RX ORDER — ACETAMINOPHEN 500 MG
1000 TABLET ORAL EVERY 8 HOURS PRN
Status: DISCONTINUED | OUTPATIENT
Start: 2023-06-16 | End: 2023-06-16 | Stop reason: HOSPADM

## 2023-06-16 RX ORDER — ATORVASTATIN CALCIUM 40 MG/1
80 TABLET, FILM COATED ORAL NIGHTLY
Status: DISCONTINUED | OUTPATIENT
Start: 2023-06-16 | End: 2023-06-16 | Stop reason: HOSPADM

## 2023-06-16 RX ORDER — IBUPROFEN 200 MG
16 TABLET ORAL
Status: DISCONTINUED | OUTPATIENT
Start: 2023-06-16 | End: 2023-06-16 | Stop reason: HOSPADM

## 2023-06-16 RX ORDER — ONDANSETRON 2 MG/ML
4 INJECTION INTRAMUSCULAR; INTRAVENOUS EVERY 6 HOURS PRN
Status: DISCONTINUED | OUTPATIENT
Start: 2023-06-16 | End: 2023-06-16 | Stop reason: HOSPADM

## 2023-06-16 RX ORDER — MORPHINE SULFATE 4 MG/ML
4 INJECTION, SOLUTION INTRAMUSCULAR; INTRAVENOUS EVERY 4 HOURS PRN
Status: DISCONTINUED | OUTPATIENT
Start: 2023-06-16 | End: 2023-06-16 | Stop reason: HOSPADM

## 2023-06-16 RX ORDER — NALOXONE HCL 0.4 MG/ML
0.02 VIAL (ML) INJECTION
Status: DISCONTINUED | OUTPATIENT
Start: 2023-06-16 | End: 2023-06-16 | Stop reason: HOSPADM

## 2023-06-16 RX ORDER — GLUCAGON 1 MG
1 KIT INJECTION
Status: DISCONTINUED | OUTPATIENT
Start: 2023-06-16 | End: 2023-06-16 | Stop reason: HOSPADM

## 2023-06-16 RX ORDER — HYDROCODONE BITARTRATE AND ACETAMINOPHEN 5; 325 MG/1; MG/1
1 TABLET ORAL EVERY 6 HOURS PRN
Status: DISCONTINUED | OUTPATIENT
Start: 2023-06-16 | End: 2023-06-16 | Stop reason: HOSPADM

## 2023-06-16 RX ORDER — HYDRALAZINE HYDROCHLORIDE 25 MG/1
100 TABLET, FILM COATED ORAL 3 TIMES DAILY
Status: DISCONTINUED | OUTPATIENT
Start: 2023-06-16 | End: 2023-06-16 | Stop reason: HOSPADM

## 2023-06-16 RX ORDER — IBUPROFEN 200 MG
24 TABLET ORAL
Status: DISCONTINUED | OUTPATIENT
Start: 2023-06-16 | End: 2023-06-16 | Stop reason: HOSPADM

## 2023-06-16 RX ORDER — METOPROLOL SUCCINATE 50 MG/1
100 TABLET, EXTENDED RELEASE ORAL DAILY
Status: DISCONTINUED | OUTPATIENT
Start: 2023-06-16 | End: 2023-06-16 | Stop reason: HOSPADM

## 2023-06-16 RX ADMIN — ASPIRIN 81 MG 81 MG: 81 TABLET ORAL at 10:06

## 2023-06-16 RX ADMIN — SODIUM CHLORIDE, SODIUM LACTATE, POTASSIUM CHLORIDE, AND CALCIUM CHLORIDE 1000 ML: .6; .31; .03; .02 INJECTION, SOLUTION INTRAVENOUS at 12:06

## 2023-06-16 RX ADMIN — METOPROLOL SUCCINATE 100 MG: 50 TABLET, FILM COATED, EXTENDED RELEASE ORAL at 10:06

## 2023-06-16 RX ADMIN — HYDRALAZINE HYDROCHLORIDE 100 MG: 25 TABLET, FILM COATED ORAL at 10:06

## 2023-06-16 RX ADMIN — AMLODIPINE BESYLATE 10 MG: 5 TABLET ORAL at 10:06

## 2023-06-16 NOTE — PLAN OF CARE
06/16/23 0839   Post-Acute Status   Post-Acute Authorization Other   Other Status Awaiting f/u Appts     Per Dr. Anguiano note, anticipating discharge on today- labs and vital signs stable. InBasket message sent to PCP office to notify of observation at [facility] and requesting hospital follow up appointment    Patient already followed by OP Case Management. [facility] placed as patient lives in [place].

## 2023-06-16 NOTE — PLAN OF CARE
06/16/23 1239   Final Note   Assessment Type Final Discharge Note   Anticipated Discharge Disposition Home   What phone number can be called within the next 1-3 days to see how you are doing after discharge? [phone]   Hospital Resources/Appts/Education Provided Appointments scheduled and added to AVS   Post-Acute Status   Discharge Delays None known at this time     Discharge orders and chart reviewed. No other discharge needs noted at this time. Pt is clear for discharge from case management. Pt is discharging to home.    Pcp appt scheduled and added to avs

## 2023-06-16 NOTE — H&P
[facility] Medicine History & Physical Examination   Patient Name: Steff Johnson  MRN: [id number]  Patient Class: Emergency   Admission Date: 6/15/2023  4:46 PM  Length of Stay: 0  Attending Physician: Juan Alberto Iqbal MD  Primary Care Provider: Cristina Ren MD  Face-to-Face encounter date: 06/15/2023  Code Status: Full Code  Chief Complaint: Hypoglycemia (Hypoglycemia today, 45 at home)        HISTORY OF PRESENT ILLNESS:   Steff Johnson is a 63 y.o. Black or , right-handed female with a history of stroke x3, essential hypertension, COPD, and diabetes mellitus insulin dependent, was brought to the ED due to hypoglycemia and altered mental status. According to her significant other and primary caretaker, she has been generally well till today when he checked patient's blood glucose dropped from 75 to less than 50 this morning with associated change in mental status. He also reported mildly decreased oral intake especially fluids, with no recent episodes of vomiting. He tried to give her peppermint but she started choking so he decided to bring her to ER for evaluation. In the ER, after administration of D50, the patient's mental status returned to baseline. Notably, the patient's speech is usually slurred due to her stroke history, but she can answer yes/no questions, follow commands, and converse normally with her family. She denies experiencing headache, fever, chest pain, shortness of breath, or gastrointestinal symptoms. Her labs in the ER consistent with COURTNEY. Also her blood pressures ranging from 180 to >200. Hospital medicine consulted for management of hypoglycemia, high blood pressure and her renal function.     REVIEW OF SYSTEMS:   10 Point Review of System was performed and was found to be negative except for that mentioned already in the HPI above.     PAST MEDICAL HISTORY:     Past Medical History:   Diagnosis Date    Arthritis     Complete tear of left  rotator cuff 11/09/2017    COPD (chronic obstructive pulmonary disease)     COVID-19 infection 07/02/2021    Diabetes mellitus     H/o Cerebrovascular accident (CVA) of left pontine structure 12/12/2019    Hypertension     Personal history of colonic polyps     Stroke     Wears glasses        PAST SURGICAL HISTORY:     Past Surgical History:   Procedure Laterality Date    COLONOSCOPY N/A 4/11/2017    Procedure: COLONOSCOPY;  Surgeon: Jared Antonio MD;  Location: [facility] ENDO;  Service: Endoscopy;  Laterality: N/A;    HYSTERECTOMY      OOPHORECTOMY      SHOULDER SURGERY      R    TRANSESOPHAGEAL ECHOCARDIOGRAM WITH POSSIBLE CARDIOVERSION (GT W/ POSS CARDIOVERSION) N/A 5/4/2023    Procedure: Transesophageal echo (GT) intra-procedure log documentation;  Surgeon: Blade Phillip MD;  Location: [facility] CATH/EP LAB;  Service: Cardiology;  Laterality: N/A;       ALLERGIES:   Patient has no known allergies.    FAMILY HISTORY:     Family History   Problem Relation Age of Onset    Diabetes Mother     Asthma Mother     Hypertension Mother     Diabetes Father     Diabetes Brother     Hypertension Brother     Anemia Daughter     Glaucoma Neg Hx     Macular degeneration Neg Hx     Retinal detachment Neg Hx        SOCIAL HISTORY:     Social History     Tobacco Use    Smoking status: Never    Smokeless tobacco: Never   Substance Use Topics    Alcohol use: No        Social History     Substance and Sexual Activity   Sexual Activity Not Currently        HOME MEDICATIONS:     Prior to Admission medications    Medication Sig Start Date End Date Taking? Authorizing Provider   amLODIPine (NORVASC) 10 MG tablet Take 1 tablet (10 mg total) by mouth once daily. 6/6/23 6/5/24 Yes Cristina Ren MD   aspirin 81 MG Chew Take 1 tablet (81 mg total) by mouth once daily. 4/28/23 6/15/23 Yes Juan Alberto Iqbal MD   atorvastatin (LIPITOR) 80 MG tablet Take 1 tablet (80 mg total) by mouth every evening. 5/5/23 6/15/23 Yes Vilma Rodriguez MD  "  chlorthalidone (HYGROTEN) 50 MG Tab Take 1 tablet (50 mg total) by mouth once daily. 5/12/23 5/11/24 Yes Cristina Ren MD   hydrALAZINE (APRESOLINE) 100 MG tablet Take 1 tablet (100 mg total) by mouth 3 (three) times daily. 2/16/23 2/16/24 Yes Cristina Ren MD   insulin aspart U-100 (NOVOLOG) 100 unit/mL injection Inject 12 Units into the skin 3 (three) times daily before meals. 5/12/23 5/6/24 Yes Cristina Ren MD   insulin degludec (TRESIBA FLEXTOUCH U-100) 100 unit/mL (3 mL) insulin pen ADMINISTER 40 UNITS UNDER THE SKIN EVERY EVENING  Patient taking differently: Inject 40 Units into the skin every evening. ADMINISTER 40 UNITS UNDER THE SKIN EVERY EVENING 5/12/23  Yes Cristina Ren MD   losartan (COZAAR) 100 MG tablet Take 1 tablet (100 mg total) by mouth once daily. 6/6/23 6/5/24 Yes Cristina Ren MD   metoprolol succinate (TOPROL-XL) 100 MG 24 hr tablet Take 1 tablet (100 mg total) by mouth once daily. 2/16/23 6/15/23 Yes Cristina Ren MD   dexAMETHasone (OZURDEX) 0.7 mg Impl intravitreal implant 0.7 mg by Intravitreal route once.    Historical Provider   pen needle, diabetic (BD ULTRA-FINE SHORT PEN NEEDLE) 31 gauge x 5/16" Ndle 1 pen by Misc.(Non-Drug; Combo Route) route 4 (four) times daily. 2/16/23   Cristina Ren MD   tiotropium (SPIRIVA) 18 mcg inhalation capsule Inhale 1 capsule (18 mcg total) into the lungs once daily. Controller 2/16/23 2/16/24  Cristina Ren MD   blood-glucose meter (TRUE METRIX GLUCOSE METER) kit Use as instructed 10/20/21 5/18/22  Gabriel Moran NP   lancing device (TRUEDRAW LANCING DEVICE) Misc Inject 1 Device into the skin 2 (two) times daily. 3/31/22 5/18/22  Gabriel Moran NP         PHYSICAL EXAM:   BP (!) 181/82   Pulse 69   Temp 97.8 °F (36.6 °C) (Oral)   Resp 15   Ht 4' 11" (1.499 m)   Wt 64 kg (141 lb)   SpO2 97%   BMI 28.48 kg/m²   Vitals Reviewed  General appearance: non-toxic and not acutely ill-appearingBlack or  " female in no apparent distress.  Skin: No Rash.   Neuro: Motor and sensory exams grossly intact. Good tone. Power in all 4 extremities 5/5.   HENT: Atraumatic head. Moist mucous membranes of oral cavity.  Eyes: Normal extraocular movements.   Neck: Supple. No evidence of lymphadenopathy. No thyroidomegaly.  Lungs: Clear to auscultation bilaterally. No wheezing present.   Heart: Regular rate and rhythm. S1 and S2 present with no murmurs/gallop/rub. No pedal edema. No JVD present.   Abdomen: Soft, non-distended, non-tender. No rebound tenderness/guarding. No masses or organomegaly. Bowel sounds are normal. Bladder is not palpable.   Extremities: No cyanosis, clubbing.  Psych/mental status: Alert and oriented. Cooperative. Responds appropriately to questions.   EMERGENCY DEPARTMENT LABS AND IMAGING:     Labs Reviewed   CBC W/ AUTO DIFFERENTIAL - Abnormal; Notable for the following components:       Result Value    Hemoglobin 11.8 (*)     Hematocrit 33.1 (*)     MCV 80 (*)     RDW 15.6 (*)     Platelets 455 (*)     Immature Grans (Abs) 0.05 (*)     Mono # 1.4 (*)     All other components within normal limits   COMPREHENSIVE METABOLIC PANEL - Abnormal; Notable for the following components:    Potassium 3.4 (*)     CO2 21 (*)     Glucose 33 (*)     BUN 31 (*)     Creatinine 1.7 (*)     eGFR 33.1 (*)     All other components within normal limits    Narrative:     Glu critical result(s) repeated. Called and verbal readback obtained   from Rita Kirkland RN/ed by JOE 06/15/2023 18:01   URINALYSIS, REFLEX TO URINE CULTURE - Abnormal; Notable for the following components:    Protein, UA Trace (*)     All other components within normal limits    Narrative:     Specimen Source->Urine   POCT GLUCOSE - Abnormal; Notable for the following components:    POC Glucose 37 (*)     All other components within normal limits   POCT GLUCOSE - Abnormal; Notable for the following components:    POC Glucose 192 (*)     All other components  within normal limits   MAGNESIUM   MAGNESIUM   LACTIC ACID, PLASMA   TROPONIN I HIGH SENSITIVITY   CK   TROPONIN I HIGH SENSITIVITY   C-REACTIVE PROTEIN   B-TYPE NATRIURETIC PEPTIDE   TROPONIN I HIGH SENSITIVITY   CK   POCT GLUCOSE   POCT GLUCOSE   POCT GLUCOSE MONITORING CONTINUOUS       X-Ray Chest AP Portable    (Results Pending)       ASSESSMENT & PLAN:   Steff Johnson is a 63 y.o. female admitted for    Active Hospital Problems    Diagnosis    *Dehydration    Hypoglycemia    Aphasia    History of stroke    ALEX (acute kidney injury)    Hypertensive urgency          Plan  Admit to [facility] surg   Cardiac monitoring  IV fluids   Monitor renal function  Holding Insulin, monitor glucose levels  Restarting home medications  Holding nephrotoxic medications  If renal function back to baseline with no hypoglycemia and uncontrolled HTN, she can be discharged home    Diet: Cardiac    DVT Prophylaxis: low molecular weight heparin and Encourage ambulation. OOB as tolerated.     Discharge Planning and Disposition:  Home with assistance from family    Expected LOS: 2-3 days     ________________________________________________________________________________    Face-to-Face encounter date: 06/15/2023  Encounter included review of the medical records, interviewing and examining the patient face-to-face, discussion with family and other health care providers including emergency medicine physician, admission orders, interpreting lab/test results and formulating a plan of care.   Medical Decision Making during this encounter was High Complexity due to Alex and high blood pressure  ________________________________________________________________________________    INPATIENT LIST OF MEDICATIONS     Current Facility-Administered Medications:     [START ON 6/16/2023] lactated ringers bolus 1,000 mL, 1,000 mL, Intravenous, Once, Juan Alberto Iqbal MD    Current Outpatient Medications:     amLODIPine (NORVASC) 10 MG tablet, Take 1 tablet  "(10 mg total) by mouth once daily., Disp: 90 tablet, Rfl: 3    aspirin 81 MG Chew, Take 1 tablet (81 mg total) by mouth once daily., Disp: 30 tablet, Rfl: 0    atorvastatin (LIPITOR) 80 MG tablet, Take 1 tablet (80 mg total) by mouth every evening., Disp: 30 tablet, Rfl: 0    chlorthalidone (HYGROTEN) 50 MG Tab, Take 1 tablet (50 mg total) by mouth once daily., Disp: 90 tablet, Rfl: 3    hydrALAZINE (APRESOLINE) 100 MG tablet, Take 1 tablet (100 mg total) by mouth 3 (three) times daily., Disp: 270 tablet, Rfl: 1    insulin aspart U-100 (NOVOLOG) 100 unit/mL injection, Inject 12 Units into the skin 3 (three) times daily before meals., Disp: 10.8 mL, Rfl: 11    insulin degludec (TRESIBA FLEXTOUCH U-100) 100 unit/mL (3 mL) insulin pen, ADMINISTER 40 UNITS UNDER THE SKIN EVERY EVENING (Patient taking differently: Inject 40 Units into the skin every evening. ADMINISTER 40 UNITS UNDER THE SKIN EVERY EVENING), Disp: 12 pen, Rfl: 1    losartan (COZAAR) 100 MG tablet, Take 1 tablet (100 mg total) by mouth once daily., Disp: 90 tablet, Rfl: 3    metoprolol succinate (TOPROL-XL) 100 MG 24 hr tablet, Take 1 tablet (100 mg total) by mouth once daily., Disp: 90 tablet, Rfl: 1    dexAMETHasone (OZURDEX) 0.7 mg Impl intravitreal implant, 0.7 mg by Intravitreal route once., Disp: , Rfl:     pen needle, diabetic (BD ULTRA-FINE SHORT PEN NEEDLE) 31 gauge x 5/16" Ndle, 1 pen by Misc.(Non-Drug; Combo Route) route 4 (four) times daily., Disp: 300 each, Rfl: 5    tiotropium (SPIRIVA) 18 mcg inhalation capsule, Inhale 1 capsule (18 mcg total) into the lungs once daily. Controller, Disp: 90 capsule, Rfl: 3      Scheduled Meds:   [START ON 6/16/2023] lactated ringers  1,000 mL Intravenous Once     Continuous Infusions:  PRN Meds:.      Juan Alberto Iqbal  [facility] Hospitalist  06/15/2023    "

## 2023-06-16 NOTE — TELEPHONE ENCOUNTER
----- Message from Flor Bourgeois RN sent at 6/16/2023  8:37 AM CDT -----  Good Morning    Patient presented to [facility] with hypoglycemia and AMS. She was observed overnight and anticipated to discharge home today. Please schedule hospital follow up appt within 7 days and contact patient with appt information.     Thank you  Flor bourgeois RN CM

## 2023-06-16 NOTE — TELEPHONE ENCOUNTER
Called patient to advise hospital follow up scheduled. Scheduled to soonest and placed on wait list as urgent. No answer, left voicemail to return call.

## 2023-06-16 NOTE — PLAN OF CARE
06/16/23 1231   POTTER Message   Medicare Outpatient and Observation Notification regarding financial responsibility Given to patient/caregiver;Explained to patient/caregiver;Signed/date by patient/caregiver   Date POTTER was signed 06/16/23   Time POTTER was signed 1215     POTTER explained to patient- pt v/u and signed form. POTTER uploaded into media manager.

## 2023-06-16 NOTE — PROGRESS NOTES
Discharge educations and instructions provided, questions answered and understanding voiced; PIVs Removed; Tele removed; safety intact with assessment ongoing; pt escorted off unit with all belongings and safety intact; Discharge instructions and education reviewed with ride in vehicle, understand voiced;

## 2023-06-16 NOTE — DISCHARGE INSTRUCTIONS
Please hold Tresiba one more night and then can restart tomorrow night.  Please stop Chlorthalidone as I worry it may be making you dehydrated.  Please follow up with your Primary Care Doctor within 1-2 weeks for repeat blood pressure and blood sugar check.  Please keep a log of your blood sugars to bring to the appointment to show your doctor- please write down the times, dates and blood sugar numbers.    - Keep all follow up appointment   - Take all medications as directed   - Follow all discharge instructions

## 2023-06-17 NOTE — HOSPITAL COURSE
Patient admitted with hypoglycemia (presenting complaint), COURTNEY, uncontrolled HTN. I spoke to her significant other who reports no changes in her diabetic medication recently. She eats well. She does not drink enough water.  Review of records reveal recent addition of diuretic since last hospitalization.  I think this contributed to COURTNEY/volume depletion in addition to poor water intake which led to more lasting effects of insulin in her system and caused the hypoglycemia.  IVFs given and COURTNEY resolved. No further hypoglycemia.  She is eating well and would like to go home. I have recommended holding her Tresiba one more day and then restarting tomorrow as blood sugar 70s to 150s presently while off tresiba.  Last HgA1c was elevated, however, and thus I do believe she still requires this degree of insulin.  I have recommended stopping the chlorthalidone as BP has been okay controlled without it here and I think she doesn't drink enough water and this leads to dehydration. She will keep a log of her sugars and follow up with PCP for further BP and blood sugar eval. Examined on day of discharge and alert, NAD, comfortable respirations on room air. I discussed plan with her significant other by phone.

## 2023-06-17 NOTE — DISCHARGE SUMMARY
[facility] Medicine  Discharge Summary      Patient Name: Steff Johnson  MRN: [id number]  JOYA: [id number]  Patient Class: OP- Observation  Admission Date: 6/15/2023  Hospital Length of Stay: 0 days  Discharge Date and Time: 6/16/2023  2:51 PM  Attending Physician: No att. providers found   Discharging Provider: Eva Romano MD  Primary Care Provider: Cristina Ren MD    Primary Care Team: Networked reference to record PCT     HPI:   No notes on file    * No surgery found *      Hospital Course:   Patient admitted with hypoglycemia (presenting complaint), COURTNEY, uncontrolled HTN. I spoke to her significant other who reports no changes in her diabetic medication recently. She eats well. She does not drink enough water.  Review of records reveal recent addition of diuretic since last hospitalization.  I think this contributed to COURTNEY/volume depletion in addition to poor water intake which led to more lasting effects of insulin in her system and caused the hypoglycemia.  IVFs given and COURTNEY resolved. No further hypoglycemia.  She is eating well and would like to go home. I have recommended holding her Tresiba one more day and then restarting tomorrow as blood sugar 70s to 150s presently while off tresiba.  Last HgA1c was elevated, however, and thus I do believe she still requires this degree of insulin.  I have recommended stopping the chlorthalidone as BP has been okay controlled without it here and I think she doesn't drink enough water and this leads to dehydration. She will keep a log of her sugars and follow up with PCP for further BP and blood sugar eval. Examined on day of discharge and alert, NAD, comfortable respirations on room air. I discussed plan with her significant other by phone.        Goals of Care Treatment Preferences:  Code Status: Full Code      Consults:   Consults (From admission, onward)        Status Ordering Provider     IP consult to case management  Once         Provider:  (Not yet assigned)    ESTHER Kang          No new Assessment & Plan notes have been filed under this hospital service since the last note was generated.  Service: Hospital Medicine    Final Active Diagnoses:    Diagnosis Date Noted POA    History of stroke [Z86.73] 05/02/2023 Not Applicable     Chronic      Problems Resolved During this Admission:    Diagnosis Date Noted Date Resolved POA    PRINCIPAL PROBLEM:  Dehydration [E86.0] 06/15/2023 06/16/2023 Yes    Hypoglycemia [E16.2] 06/15/2023 06/16/2023 Yes    Aphasia [R47.01] 05/31/2023 06/16/2023 Yes    COURTNEY (acute kidney injury) [N17.9] 01/05/2022 06/16/2023 Yes    Hypertensive urgency [I16.0] 03/31/2021 06/16/2023 Yes       Discharged Condition: good    Disposition: Home or Self Care    Follow Up:   Follow-up Information     Cristina Ren MD. Go on 6/27/2023.    Specialty: Family Medicine  Why: hospital follow up appt scheduled 6/27 at 3PM  Contact information:  [address] [facility] [place] [address]  [phone]                       Patient Instructions:      Ambulatory referral/consult to [facility]   Standing Status: Future   Referral Priority: Routine Referral Type: Consultation   Referral Reason: Specialty Services Required   Number of Visits Requested: 1     Diet Cardiac     Diet diabetic     Notify your health care provider if you experience any of the following:  increased confusion or weakness     Activity as tolerated       Significant Diagnostic Studies: Labs:   CMP   Recent Labs   Lab 06/15/23  1147 06/15/23  1709 06/16/23  0522    141 138   K 3.8 3.4* 4.1    109 109   CO2 21* 21* 22*   GLU 85 33* 70   BUN 26* 31* 21   CREATININE 1.4 1.7* 1.2   CALCIUM 9.2 9.3 8.8   PROT  --  8.3  --    ALBUMIN 3.5 4.2  --    BILITOT  --  0.8  --    ALKPHOS  --  72  --    AST  --  25  --    ALT  --  28  --    ANIONGAP 11 11 7*    and CBC   Recent Labs   Lab 06/15/23  1147 06/15/23  1709 06/16/23  0523   WBC  "8.82 11.22 9.90   HGB 11.0* 11.8* 11.5*   HCT 31.8* 33.1* 32.3*   * 455* 444       Pending Diagnostic Studies:     None         Medications:  Reconciled Home Medications:      Medication List      CHANGE how you take these medications    insulin degludec 100 unit/mL (3 mL) insulin pen  Commonly known as: TRESIBA FLEXTOUCH U-100  ADMINISTER 40 UNITS UNDER THE SKIN EVERY EVENING  What changed:   · how much to take  · how to take this  · when to take this        CONTINUE taking these medications    amLODIPine 10 MG tablet  Commonly known as: NORVASC  Take 1 tablet (10 mg total) by mouth once daily.     aspirin 81 MG Chew  Take 1 tablet (81 mg total) by mouth once daily.     atorvastatin 80 MG tablet  Commonly known as: LIPITOR  Take 1 tablet (80 mg total) by mouth every evening.     hydrALAZINE 100 MG tablet  Commonly known as: APRESOLINE  Take 1 tablet (100 mg total) by mouth 3 (three) times daily.     insulin aspart U-100 100 unit/mL injection  Commonly known as: NovoLOG  Inject 12 Units into the skin 3 (three) times daily before meals.     losartan 100 MG tablet  Commonly known as: COZAAR  Take 1 tablet (100 mg total) by mouth once daily.     metoprolol succinate 100 MG 24 hr tablet  Commonly known as: TOPROL-XL  Take 1 tablet (100 mg total) by mouth once daily.     OZURDEX 0.7 mg Impl intravitreal implant  Generic drug: dexAMETHasone  0.7 mg by Intravitreal route once.     pen needle, diabetic 31 gauge x 5/16" Ndle  Commonly known as: BD ULTRA-FINE SHORT PEN NEEDLE  1 pen by Misc.(Non-Drug; Combo Route) route 4 (four) times daily.     tiotropium 18 mcg inhalation capsule  Commonly known as: SPIRIVA  Inhale 1 capsule (18 mcg total) into the lungs once daily. Controller        STOP taking these medications    chlorthalidone 50 MG Tab  Commonly known as: HYGROTEN            Indwelling Lines/Drains at time of discharge:   Lines/Drains/Airways     None                 Time spent on the discharge of patient: 35 " minutes         Eva Romano MD  Department of Hospital Medicine  [facility]

## 2023-06-21 ENCOUNTER — CLINICAL SUPPORT (OUTPATIENT)
Dept: REHABILITATION | Facility: HOSPITAL | Age: 63
End: 2023-06-21
Payer: MEDICARE

## 2023-06-21 DIAGNOSIS — R47.01 APHASIA: ICD-10-CM

## 2023-06-21 DIAGNOSIS — R41.841 COGNITIVE COMMUNICATION DEFICIT: Primary | ICD-10-CM

## 2023-06-21 DIAGNOSIS — R47.1 DYSARTHRIA: ICD-10-CM

## 2023-06-21 DIAGNOSIS — R49.0 DYSPHONIA: ICD-10-CM

## 2023-06-21 PROCEDURE — 92522 EVALUATE SPEECH PRODUCTION: CPT | Mod: HCNC,PN

## 2023-06-21 NOTE — PROGRESS NOTES
[facility]  Speech Therapy Treatment Note- Neurological Rehabilitation  Date: 6/21/2023     Name: Steff Johnson   MRN: [id number]   Therapy Diagnosis:   Encounter Diagnoses   Name Primary?    Dysarthria     Dysphonia     Cognitive communication deficit Yes    Aphasia      Physician: Cristina Ren MD  Physician Orders: Ambulatory Referral to Speech Therapy   Medical Diagnosis:   I63.9 (ICD-10-CM) - Ischemic stroke   R47.1 (ICD-10-CM) - Dysarthria     Visit #/ Visits Authorized: 1/20  Date of Evaluation:  5/31/2023  Insurance Authorization Period: 6/16/2023 - 12/31/2023  Plan of Care Expiration Date: 8/23/2023  Extended Plan of Care:  n/a   Progress Note: 7/12/2023     Time In:  4:20 PM  Time Out:  5:05 PM  Total Billable Time: 45 minutes     Precautions: Standard, Fall, Diabetes , Cognition, and Communication  Subjective:   Patient reports:   Patient's friend, Lowell, brought her to therapy.     She was compliant to home exercise program.   Response to previous treatment: good  Pain Scale: 0/10 on a Visual Analog Scale currently.  Pain Location: 0   Patient with no pain behaviors.   Objective:   TIMED  Procedure Min.   Cognitive Therapeutic Interventions, first 15 minutes CPT 63388  -   Cognitive Therapeutic Interventions, each additional 15 minutes CPT 58518  -         UNTIMED  Procedure   Speech- Language- Voice Therapy                                      -   Speech sound evaluation                                               45 minutes       Short Term Goals: (4 weeks) Current Progress:   1. Patient will answer complex yes/no questions with 80% accuracy to target auditory comprehension.    Progressing/ Not Met 6/21/2023   Not addressed on this date due to focus on dysarthria and voice assessments.     2. Patient will follow 2-3 step directions with 80% accuracy across three sessions.       Progressing/ Not Met 6/21/2023   Not addressed on this date due to focus on dysarthria and voice  assessments.      3. Patient will identify at least 4 word finding strategies across three sessions.      Progressing/ Not Met 6/21/2023   Not addressed on this date due to focus on dysarthria and voice assessments.      4. Patient will identify x10 items within a concrete divergent naming task within 1-2 minutes.       Progressing/ Not Met 6/21/2023   Not addressed on this date due to focus on dysarthria and voice assessments.      5. Patient will participate in Verb Network Strength Training for 2-4 verbs per session with min A to improve word fluency.     Progressing/ Not Met 6/21/2023   Not addressed on this date due to focus on dysarthria and voice assessments.      6. Patient will describe a picture using at least 5/6 characteristics given a Semantic Feature Analysis visual in 3/3 trials per session to address word finding strategies.      Progressing/ Not Met 6/21/2023   Not addressed on this date due to focus on dysarthria and voice assessments.      7. Patient will answer orientation questions: month, year, day of the week, place, etiology with 100% accuracy across three sessions.      Progressing/ Not Met 6/21/2023   Not addressed on this date due to focus on dysarthria and voice assessments.      8. Patient will complete low level functional problem solving tasks with 80% accuracy given minimal verbal and visual cues.    Progressing/Not Met 6/21/2023 Not addressed on this date due to focus on dysarthria and voice assessments.     9. Complete motor speech evaluation to evaluate dysarthria.    Met 6/21/2023 See results below   10. Patient will utilize diaphragmatic breathing to produce a 6 second exhalation with moderate facilitation by Speech Language Pathologist.     Progressing/Not Met 6/21/2023 NEW GOAL   11. Patient will recall 3/4 clear speech strategies given minimal cues.     Progressing/Not Met 6/21/2023 NEW GOAL         MOTOR SPEECH/DYSARTHRIA EVALUATION    History  Diagnosis:   Localization:    Associated Deficits: Cognitive/Linguistic, motor weakness/coordination     Evaluation of Speech Mechanisms  Respiration:  Posture: ABN  Breath Support: Reduced  Breath Rate: Slow  Respiratory Features: Abnormal: Clavicular    Oral Mechanism at Rest   Labial Structures  Structure Atrophied   Symmetry Right   Tone reduced   Ability to control secretions No drooling/secretions during assessment        Lingual Structure (at rest in mouth)   Structure WNL   Symmetry WNL   Tone reduced   Irregular Movement WNL       Mandible  Symmetry WNL   Posture at Rest WNL=closed   Dentition WNL     Face  Symmetry Right Facial Droop - especially right lip   Expression Flat affect   Irregular Movement WNL     Soft Palate  Symmetry WNL   Structure WNL     Hard Palate  Structure WNL     Oral Mechanism during Sustained Postures   Labial Retraction   Symmetry Right Reduced   Range Reduced   Tone Reduced   Strength Reduced     Labial Protrusion  Range Reduced   Tone Reduced   Strength Reduced     Labial Compression  Strength (Upper R) Reduced   Strength (Upper L) WNL   Strength (Lower R) Reduced   Strength (Lower L) WNL     Lingual Protrusion  Symmetry WNL   Range WNL   Tone WNL   Strength WNL     Lingual Elevation to Alveolar Ridge   Range WNL   Symmetry WNL     Mandible Depression  Symmetry WNL   Range WNL   Jaw Clonus absent   Strength WNL     Mandible Elevation  Symmetry WNL   Range WNL   Strength WNL     Face: Raising Eyebrows  Symmetry WNL   Range WNL   Forehead Wrinkle WNL     Velopharyngeal Function: Cheek Puffing   Symmetry Right   Range Reduced-Cannot maintain air in mouth   Tone Reduced   Strength Reduced       Oral Mechanism during Movement   Labial Protrusion and Lateralization   Rate Slow   Rhythm Irregular     Lingual Lateralization (External)   Rate Slow   Rhythm Irregular   Range Reduced     Circular Range of Motion (External)   Rate Slow   Rhythm Irregular   Range Reduced     Velopharyngeal Function: Sustained /a/  Velum  Range Reduced   Pharyngeal Wall Range Reduced     Gross Sensation  Sensation  Face: Upper L WNL    Upper R WNL    Lower L WNL    Lower R Reduced   Lips: Upper L WNL    Upper R Reduced    Lower L WNL    Lower R Reduced         Alternating Motion Rates (AMRs) and Sequential Motion Rates (SMRs)  SMRs /pu/  Rate Slow   Rhythm Irregular   Accuracy Blurred   Norm 5.0-7.1 Dora/Sec Below norm     SMRs /tu/  Rate Slow   Rhythm Irregular   Accuracy Blurred   Norm 4.8-7.1 Dora/Sec Below norm     SMRs /ku/  Rate Slow   Rhythm Irregular   Accuracy Blurred   Norm 4.4-7.5 Dora/Sec Below norm     AMRs /putuku/  Rate Slow   Rhythm Irregular   Accuracy Blurred and inaccurate   Norm 3.6-7.5 Dora/Sec Below norm     Perceptual Ratings  Respiratory/Phonatory Features: Reduced loudness  Phonatory Quality: harsh   Resonatory Features: Hypernasality and nasal emission  Articulatory Features: Imprecise consonants  Prosodic Features: Short phrases    Diagnosis   OVERALL IMPRESSION:   Patient presents with Moderate-severe Mixed flaccid dysarthria characterized by slow rate of speech, imprecise consonants, short phrases, hypernasality as well as ataxic characteristics including irregular articulatory movement and harsh vocal quality.      VOICE EVALUATION:    Swallowing: Patient and caregivers report within normal limits.    Breathing: SOB      Perceptual assessment:    -Quality: rough and strained  -Volume: decreased projection  -Pitch: appropriate for age and gender  -Flexibility: diminished    Habitual respiratory pattern: chest/clavicular.  CAPE-V Overall Score: 61  MPT (ah > 18s WFL): 3 seconds  S/Z ratio (ratio of 1.4+ may indicate a degree of vocal fold dysfunction): 3 seconds/0 seconds     VHI-10 (completed to assess self-perceived handicap associated with dysphonia; >11 considered abnormal): 89  Indicating severe deficit     Patient Education/Response:   Patient educated regarding the followin. Verbalized review of speech and voice  assessments.    Home program established: Patient instructed to continue prior program  Patient verbalized understanding to all above education provided.     See Electronic Medical Record under Patient Instructions for exercises provided throughout therapy.  Assessment:   Session focused on speech and voice evaluation. Patient presents with a moderate to severe mixed flaccid dysarthria with characteristics of ataxia of speech and dysphonia as characterized by limited vocal intensity, harsh and rough vocal quality, limited pitch flexibility, short phrases, slow rate of speech, imprecise consonant, hypernasality, and irregular articulatory movement.     Steff is progressing well towards her goals. Current goals remain appropriate. Goals to be updated as necessary.     Patient prognosis is Good. Patient will continue to benefit from skilled outpatient speech and language therapy to address the deficits listed in the problem list on initial evaluation, provide patient/family education and to maximize patient's level of independence in the home and community environment.   Medical necessity is demonstrated by the following IMPAIRMENTS:  Aphasia: decreased content words and significant word finding difficulty in all situations severely limiting functional communication with both familiar and unfamiliar communication partners to relay medically and safety relevant information in a timely manner in a state of emergency.    Cognition: Deficits in executive functioning, attention, and memory prevent the pt from relaying medically and safety relevant information in a timely manner in a state of emergency.   Motor speech: Decreased speech intelligibility results in decreased efficiency communicating medical and social wants and needs in the case of emergency.    Voice: Patient with decreased vocal intensity resulting in severely decreased speech intelligibility. Reportedly, this worsens over the phone negatively impacting his  ability to effectively and efficiently explain an emergency situation to emergency operators via phone or in person    Barriers to Therapy: severity of deficits  Patient's spiritual, cultural and educational needs considered and patient agreeable to plan of care and goals.  Plan:   Continue Plan of Care with focus on rehabilitation and compensation for language, cognition, speech, and voice.    Recommend a referral to ENT.       Nancy Dodson CCC-SLP   6/21/2023

## 2023-06-23 ENCOUNTER — OFFICE VISIT (OUTPATIENT)
Dept: OPHTHALMOLOGY | Facility: CLINIC | Age: 63
End: 2023-06-23
Payer: MEDICARE

## 2023-06-23 ENCOUNTER — PATIENT OUTREACH (OUTPATIENT)
Dept: ADMINISTRATIVE | Facility: HOSPITAL | Age: 63
End: 2023-06-23
Payer: MEDICARE

## 2023-06-23 DIAGNOSIS — H25.813 COMBINED FORMS OF AGE-RELATED CATARACT, BILATERAL: ICD-10-CM

## 2023-06-23 DIAGNOSIS — H40.053 OCULAR HYPERTENSION, BILATERAL: Primary | ICD-10-CM

## 2023-06-23 PROCEDURE — 1159F PR MEDICATION LIST DOCUMENTED IN MEDICAL RECORD: ICD-10-PCS | Mod: HCNC,CPTII,S$GLB, | Performed by: OPHTHALMOLOGY

## 2023-06-23 PROCEDURE — 3066F NEPHROPATHY DOC TX: CPT | Mod: HCNC,CPTII,S$GLB, | Performed by: OPHTHALMOLOGY

## 2023-06-23 PROCEDURE — 3066F PR DOCUMENTATION OF TREATMENT FOR NEPHROPATHY: ICD-10-PCS | Mod: HCNC,CPTII,S$GLB, | Performed by: OPHTHALMOLOGY

## 2023-06-23 PROCEDURE — 99999 PR PBB SHADOW E&M-EST. PATIENT-LVL III: CPT | Mod: PBBFAC,HCNC,, | Performed by: OPHTHALMOLOGY

## 2023-06-23 PROCEDURE — 4010F PR ACE/ARB THEARPY RXD/TAKEN: ICD-10-PCS | Mod: HCNC,CPTII,S$GLB, | Performed by: OPHTHALMOLOGY

## 2023-06-23 PROCEDURE — 3046F PR MOST RECENT HEMOGLOBIN A1C LEVEL > 9.0%: ICD-10-PCS | Mod: HCNC,CPTII,S$GLB, | Performed by: OPHTHALMOLOGY

## 2023-06-23 PROCEDURE — 4010F ACE/ARB THERAPY RXD/TAKEN: CPT | Mod: HCNC,CPTII,S$GLB, | Performed by: OPHTHALMOLOGY

## 2023-06-23 PROCEDURE — 1101F PT FALLS ASSESS-DOCD LE1/YR: CPT | Mod: HCNC,CPTII,S$GLB, | Performed by: OPHTHALMOLOGY

## 2023-06-23 PROCEDURE — 3288F PR FALLS RISK ASSESSMENT DOCUMENTED: ICD-10-PCS | Mod: HCNC,CPTII,S$GLB, | Performed by: OPHTHALMOLOGY

## 2023-06-23 PROCEDURE — 1101F PR PT FALLS ASSESS DOC 0-1 FALLS W/OUT INJ PAST YR: ICD-10-PCS | Mod: HCNC,CPTII,S$GLB, | Performed by: OPHTHALMOLOGY

## 2023-06-23 PROCEDURE — 1159F MED LIST DOCD IN RCRD: CPT | Mod: HCNC,CPTII,S$GLB, | Performed by: OPHTHALMOLOGY

## 2023-06-23 PROCEDURE — 3060F PR POS MICROALBUMINURIA RESULT DOCUMENTED/REVIEW: ICD-10-PCS | Mod: HCNC,CPTII,S$GLB, | Performed by: OPHTHALMOLOGY

## 2023-06-23 PROCEDURE — 99999 PR PBB SHADOW E&M-EST. PATIENT-LVL III: ICD-10-PCS | Mod: PBBFAC,HCNC,, | Performed by: OPHTHALMOLOGY

## 2023-06-23 PROCEDURE — 3288F FALL RISK ASSESSMENT DOCD: CPT | Mod: HCNC,CPTII,S$GLB, | Performed by: OPHTHALMOLOGY

## 2023-06-23 PROCEDURE — 99214 OFFICE O/P EST MOD 30 MIN: CPT | Mod: HCNC,S$GLB,, | Performed by: OPHTHALMOLOGY

## 2023-06-23 PROCEDURE — 3046F HEMOGLOBIN A1C LEVEL >9.0%: CPT | Mod: HCNC,CPTII,S$GLB, | Performed by: OPHTHALMOLOGY

## 2023-06-23 PROCEDURE — 3060F POS MICROALBUMINURIA REV: CPT | Mod: HCNC,CPTII,S$GLB, | Performed by: OPHTHALMOLOGY

## 2023-06-23 PROCEDURE — 99214 PR OFFICE/OUTPT VISIT, EST, LEVL IV, 30-39 MIN: ICD-10-PCS | Mod: HCNC,S$GLB,, | Performed by: OPHTHALMOLOGY

## 2023-06-23 RX ORDER — LATANOPROST 50 UG/ML
1 SOLUTION/ DROPS OPHTHALMIC DAILY
Qty: 7.5 ML | Refills: 6 | Status: SHIPPED | OUTPATIENT
Start: 2023-06-23

## 2023-06-23 NOTE — PROGRESS NOTES
HPI    Pt presents for 4 mo Dfe/Bat     States she sometimes has trouble opening OD due to crusting        Brim BID OU  Last edited by Louann Hoover on 6/23/2023 12:56 PM.            Assessment /Plan     For exam results, see Encounter Report.    Ocular hypertension, bilateral    Combined forms of age-related cataract, bilateral      1. Ocular hypertension, bilateral  Thick pachy  +famhx     ONHs look healthy  OCT NFL concerning for mild progression OS  HVF was essentially normal  gonio open with light pigment  Tmax 37/36 with tonopen    S/p Ozurdex OU 3/2023    Target IOP low 20s    IOP above target  To simplify regimen, switch brim to latan qhs OU    Stop brimonidine  Start  Latan qhs OU    Consider SLT vs phaco/migs in the future    F/u 3 months, IOP check    2. Combined forms of age-related cataract, bilateral  OD>OS  Observe for now, consider phaco/MIGs

## 2023-06-23 NOTE — PROGRESS NOTES
Population Health Chart Review & Patient Outreach Details:     Reason for Outreach Encounter:     []  Non-Compliant Report   [x]  Payor Report ([employer], PHN, BCBS, MSSP, MCIP, UHC, etc.)   []  Pre-Visit Chart Review     Updates Requested / Reviewed:     []  Care Everywhere    []     []  External Sources ([facility], [facility], DIS, etc.)   []  Care Team Updated    Patient Outreach Method:    []  Telephone Outreach Completed   [] Successful   [] Left Voicemail   [] Unable to Contact (wrong number, no voicemail)  []  [facility] Portal Outreach Sent  []  Letter Outreach Mailed  []  Fax Sent for External Records  []  External Records Upload    Health Maintenance Topics Addressed and Outreach Outcomes / Actions Taken:        []      Breast Cancer Screening []  Mammo Scheduled      []  External Records Requested     []  Added Reminder to Complete to Upcoming Primary Care Appt Notes     []  Patient Declined     []  Patient Will Call Back to Schedule     []  Patient Will Schedule with External Provider / Order Routed if Applicable             []       Cervical Cancer Screening []  Pap Scheduled      []  External Records Requested     []  Added Reminder to Complete to Upcoming Primary Care Appt Notes     []  Patient Declined     []  Patient Will Call Back to Schedule     []  Patient Will Schedule with External Provider               []          Colorectal Cancer Screening []  Colonoscopy Case Request or Referral Placed     []  External Records Requested     []  Added Reminder to Complete to Upcoming Primary Care Appt Notes     []  Patient Declined     []  Patient Will Call Back to Schedule     []  Patient Will Schedule with External Provider     []  Fit Kit Mailed (add the SmartPhrase under additional notes)     []  Reminded Patient to Complete Home Test             []      Diabetic Eye Exam []  Eye Camera Scheduled or Optometry Referral Placed     []  External Records Requested     []  Added Reminder to Complete to  Upcoming Primary Care Appt Notes     []  Patient Declined     []  Patient Will Call Back to Schedule     []  Patient Will Schedule with External Provider             []      Blood Pressure Control []  Primary Care Follow Up Visit Scheduled     []  Remote Blood Pressure Reading Captured     []  Added Reminder to Complete to Upcoming Primary Care Appt Notes     []  Patient Declined     []  Patient Will Call Back / Patient Will Send Portal Message with Reading     []  Patient Will Call Back to Schedule Provider Visit             [x]       HbA1c & Other Labs [x]  Lab Appt Scheduled for Due Labs     []  Primary Care Follow Up Visit Scheduled      []  Reminded Patient to Complete Home Test     []  Added Reminder to Complete to Upcoming Primary Care Appt Notes     []  Patient Declined     []  Patient Will Call Back to Schedule     []  Patient Will Schedule with External Provider / Order Routed if Applicable           []    Schedule Primary Care Appt []  Primary Care Appt Scheduled     []  Patient Declined     []  Patient Will Call Back to Schedule     []  Pt Established with External Provider & Updated Care Team             []      Medication Adherence []  Primary Care Appointment Scheduled     []  Added Reminder to Upcoming Primary Care Appt Notes     []  Patient Reminded to  Prescription     []  Patient Declined, Provider Notified if Needed     []  Sent Provider Message to Review and/or Add Exclusion to Problem List             []      Osteoporosis Screening []  DXA Appointment Scheduled     []  External Records Requested     []  Added Reminder to Complete to Upcoming Primary Care Appt Notes     []  Patient Declined     []  Patient Will Call Back to Schedule     []  Patient Will Schedule with External Provider / Order Routed if Applicable     Additional Care Coordinator Notes:         Further Action Needed If Patient Returns Outreach:

## 2023-06-26 ENCOUNTER — PES CALL (OUTPATIENT)
Dept: ADMINISTRATIVE | Facility: CLINIC | Age: 63
End: 2023-06-26
Payer: MEDICARE

## 2023-06-27 ENCOUNTER — TELEPHONE (OUTPATIENT)
Dept: REHABILITATION | Facility: HOSPITAL | Age: 63
End: 2023-06-27
Payer: MEDICARE

## 2023-06-27 ENCOUNTER — CLINICAL SUPPORT (OUTPATIENT)
Dept: REHABILITATION | Facility: HOSPITAL | Age: 63
End: 2023-06-27
Payer: MEDICARE

## 2023-06-27 ENCOUNTER — OFFICE VISIT (OUTPATIENT)
Dept: ENDOCRINOLOGY | Facility: CLINIC | Age: 63
End: 2023-06-27
Payer: MEDICARE

## 2023-06-27 VITALS
OXYGEN SATURATION: 98 % | TEMPERATURE: 98 F | DIASTOLIC BLOOD PRESSURE: 70 MMHG | WEIGHT: 111 LBS | HEART RATE: 68 BPM | HEIGHT: 59 IN | BODY MASS INDEX: 22.38 KG/M2 | SYSTOLIC BLOOD PRESSURE: 128 MMHG

## 2023-06-27 DIAGNOSIS — Z78.9 ALTERATION IN INSTRUMENTAL ACTIVITIES OF DAILY LIVING (IADL): ICD-10-CM

## 2023-06-27 DIAGNOSIS — E78.5 HYPERLIPIDEMIA, UNSPECIFIED HYPERLIPIDEMIA TYPE: ICD-10-CM

## 2023-06-27 DIAGNOSIS — Z74.09 IMPAIRED FUNCTIONAL MOBILITY, BALANCE, AND ENDURANCE: ICD-10-CM

## 2023-06-27 DIAGNOSIS — E11.3313 TYPE 2 DIABETES MELLITUS WITH BOTH EYES AFFECTED BY MODERATE NONPROLIFERATIVE RETINOPATHY AND MACULAR EDEMA, WITH LONG-TERM CURRENT USE OF INSULIN: ICD-10-CM

## 2023-06-27 DIAGNOSIS — Z79.4 TYPE 2 DIABETES MELLITUS WITH BOTH EYES AFFECTED BY MODERATE NONPROLIFERATIVE RETINOPATHY AND MACULAR EDEMA, WITH LONG-TERM CURRENT USE OF INSULIN: ICD-10-CM

## 2023-06-27 DIAGNOSIS — Z78.9 DECREASED INDEPENDENCE WITH ACTIVITIES OF DAILY LIVING: ICD-10-CM

## 2023-06-27 DIAGNOSIS — Z79.4 TYPE 2 DIABETES MELLITUS WITH HYPERGLYCEMIA, WITH LONG-TERM CURRENT USE OF INSULIN: Primary | ICD-10-CM

## 2023-06-27 DIAGNOSIS — Z79.4 TYPE 2 DIABETES MELLITUS WITH DIABETIC NEPHROPATHY, WITH LONG-TERM CURRENT USE OF INSULIN: Chronic | ICD-10-CM

## 2023-06-27 DIAGNOSIS — M25.60 DECREASED RANGE OF MOTION: ICD-10-CM

## 2023-06-27 DIAGNOSIS — E11.65 TYPE 2 DIABETES MELLITUS WITH HYPERGLYCEMIA, WITH LONG-TERM CURRENT USE OF INSULIN: Primary | ICD-10-CM

## 2023-06-27 DIAGNOSIS — E11.59 HYPERTENSION ASSOCIATED WITH DIABETES: ICD-10-CM

## 2023-06-27 DIAGNOSIS — E11.21 TYPE 2 DIABETES MELLITUS WITH DIABETIC NEPHROPATHY, WITH LONG-TERM CURRENT USE OF INSULIN: Chronic | ICD-10-CM

## 2023-06-27 DIAGNOSIS — I15.2 HYPERTENSION ASSOCIATED WITH DIABETES: ICD-10-CM

## 2023-06-27 DIAGNOSIS — R53.1 DECREASED STRENGTH: Primary | ICD-10-CM

## 2023-06-27 LAB — GLUCOSE SERPL-MCNC: 178 MG/DL (ref 70–110)

## 2023-06-27 PROCEDURE — 3060F PR POS MICROALBUMINURIA RESULT DOCUMENTED/REVIEW: ICD-10-PCS | Mod: HCNC,CPTII,S$GLB, | Performed by: PHYSICIAN ASSISTANT

## 2023-06-27 PROCEDURE — 3060F POS MICROALBUMINURIA REV: CPT | Mod: HCNC,CPTII,S$GLB, | Performed by: PHYSICIAN ASSISTANT

## 2023-06-27 PROCEDURE — 1101F PT FALLS ASSESS-DOCD LE1/YR: CPT | Mod: HCNC,CPTII,S$GLB, | Performed by: PHYSICIAN ASSISTANT

## 2023-06-27 PROCEDURE — 3288F PR FALLS RISK ASSESSMENT DOCUMENTED: ICD-10-PCS | Mod: HCNC,CPTII,S$GLB, | Performed by: PHYSICIAN ASSISTANT

## 2023-06-27 PROCEDURE — 1160F RVW MEDS BY RX/DR IN RCRD: CPT | Mod: HCNC,CPTII,S$GLB, | Performed by: PHYSICIAN ASSISTANT

## 2023-06-27 PROCEDURE — 3008F BODY MASS INDEX DOCD: CPT | Mod: HCNC,CPTII,S$GLB, | Performed by: PHYSICIAN ASSISTANT

## 2023-06-27 PROCEDURE — 99214 OFFICE O/P EST MOD 30 MIN: CPT | Mod: HCNC,S$GLB,, | Performed by: PHYSICIAN ASSISTANT

## 2023-06-27 PROCEDURE — 3078F DIAST BP <80 MM HG: CPT | Mod: HCNC,CPTII,S$GLB, | Performed by: PHYSICIAN ASSISTANT

## 2023-06-27 PROCEDURE — 3046F HEMOGLOBIN A1C LEVEL >9.0%: CPT | Mod: HCNC,CPTII,S$GLB, | Performed by: PHYSICIAN ASSISTANT

## 2023-06-27 PROCEDURE — 99999 PR PBB SHADOW E&M-EST. PATIENT-LVL V: CPT | Mod: PBBFAC,HCNC,, | Performed by: PHYSICIAN ASSISTANT

## 2023-06-27 PROCEDURE — 3078F PR MOST RECENT DIASTOLIC BLOOD PRESSURE < 80 MM HG: ICD-10-PCS | Mod: HCNC,CPTII,S$GLB, | Performed by: PHYSICIAN ASSISTANT

## 2023-06-27 PROCEDURE — 4010F PR ACE/ARB THEARPY RXD/TAKEN: ICD-10-PCS | Mod: HCNC,CPTII,S$GLB, | Performed by: PHYSICIAN ASSISTANT

## 2023-06-27 PROCEDURE — 99999 PR PBB SHADOW E&M-EST. PATIENT-LVL V: ICD-10-PCS | Mod: PBBFAC,HCNC,, | Performed by: PHYSICIAN ASSISTANT

## 2023-06-27 PROCEDURE — 3066F NEPHROPATHY DOC TX: CPT | Mod: HCNC,CPTII,S$GLB, | Performed by: PHYSICIAN ASSISTANT

## 2023-06-27 PROCEDURE — 3066F PR DOCUMENTATION OF TREATMENT FOR NEPHROPATHY: ICD-10-PCS | Mod: HCNC,CPTII,S$GLB, | Performed by: PHYSICIAN ASSISTANT

## 2023-06-27 PROCEDURE — 82962 POCT GLUCOSE, HAND-HELD DEVICE: ICD-10-PCS | Mod: HCNC,S$GLB,, | Performed by: PHYSICIAN ASSISTANT

## 2023-06-27 PROCEDURE — 4010F ACE/ARB THERAPY RXD/TAKEN: CPT | Mod: HCNC,CPTII,S$GLB, | Performed by: PHYSICIAN ASSISTANT

## 2023-06-27 PROCEDURE — 1101F PR PT FALLS ASSESS DOC 0-1 FALLS W/OUT INJ PAST YR: ICD-10-PCS | Mod: HCNC,CPTII,S$GLB, | Performed by: PHYSICIAN ASSISTANT

## 2023-06-27 PROCEDURE — 97530 THERAPEUTIC ACTIVITIES: CPT | Mod: HCNC,PN

## 2023-06-27 PROCEDURE — 3008F PR BODY MASS INDEX (BMI) DOCUMENTED: ICD-10-PCS | Mod: HCNC,CPTII,S$GLB, | Performed by: PHYSICIAN ASSISTANT

## 2023-06-27 PROCEDURE — 1159F MED LIST DOCD IN RCRD: CPT | Mod: HCNC,CPTII,S$GLB, | Performed by: PHYSICIAN ASSISTANT

## 2023-06-27 PROCEDURE — 99214 PR OFFICE/OUTPT VISIT, EST, LEVL IV, 30-39 MIN: ICD-10-PCS | Mod: HCNC,S$GLB,, | Performed by: PHYSICIAN ASSISTANT

## 2023-06-27 PROCEDURE — 1159F PR MEDICATION LIST DOCUMENTED IN MEDICAL RECORD: ICD-10-PCS | Mod: HCNC,CPTII,S$GLB, | Performed by: PHYSICIAN ASSISTANT

## 2023-06-27 PROCEDURE — 3074F SYST BP LT 130 MM HG: CPT | Mod: HCNC,CPTII,S$GLB, | Performed by: PHYSICIAN ASSISTANT

## 2023-06-27 PROCEDURE — 3074F PR MOST RECENT SYSTOLIC BLOOD PRESSURE < 130 MM HG: ICD-10-PCS | Mod: HCNC,CPTII,S$GLB, | Performed by: PHYSICIAN ASSISTANT

## 2023-06-27 PROCEDURE — 3046F PR MOST RECENT HEMOGLOBIN A1C LEVEL > 9.0%: ICD-10-PCS | Mod: HCNC,CPTII,S$GLB, | Performed by: PHYSICIAN ASSISTANT

## 2023-06-27 PROCEDURE — 1160F PR REVIEW ALL MEDS BY PRESCRIBER/CLIN PHARMACIST DOCUMENTED: ICD-10-PCS | Mod: HCNC,CPTII,S$GLB, | Performed by: PHYSICIAN ASSISTANT

## 2023-06-27 PROCEDURE — 3288F FALL RISK ASSESSMENT DOCD: CPT | Mod: HCNC,CPTII,S$GLB, | Performed by: PHYSICIAN ASSISTANT

## 2023-06-27 PROCEDURE — 82962 GLUCOSE BLOOD TEST: CPT | Mod: HCNC,S$GLB,, | Performed by: PHYSICIAN ASSISTANT

## 2023-06-27 RX ORDER — INSULIN DEGLUDEC 100 U/ML
INJECTION, SOLUTION SUBCUTANEOUS
Qty: 12 PEN | Refills: 1 | Status: ON HOLD
Start: 2023-06-27 | End: 2023-07-06 | Stop reason: HOSPADM

## 2023-06-27 NOTE — PROGRESS NOTES
CC: This 63 y.o. female presents for management of diabetes  and chronic conditions pending review including HTN, HLP, nephropathy, CVA, COPD, vitamin d deficiency, stroke    HPI: She was diagnosed with T2DM in 2014. Has never been hospitalized r/t DM.  Family hx of DM: mom, dad, brothers, and sister      Arrives with friend today who is her primary caretaker. Pt has a CVA in 5/23.  Last seen by MICHELE Shaw DNP. In 6/21.  Previously had dexcom but did not like wearing.  No meter, no logs today  Reports checking bg 2times a day  Fasting 150-180    Diet: Eats 3 meals a day, snacks-after dinner chuckie crackers  Exercise:  none. Starting PT soon.  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, Dr Ambrose  Podiatry exam:     Retired from [employer]- [employer] de[employer] (worked for them for 20 yrs)       Wt Readings from Last 10 Encounters:   06/27/23 50.4 kg (111 lb)   06/16/23 59.9 kg (132 lb 0.9 oz)   05/18/23 64.2 kg (141 lb 8.6 oz)   05/04/23 62.2 kg (137 lb 2 oz)   04/27/23 60.7 kg (133 lb 12.8 oz)   04/28/23 60.3 kg (133 lb)   04/25/23 62.6 kg (138 lb)   04/18/23 62.6 kg (138 lb)   02/16/23 62.6 kg (138 lb)   01/12/23 64.3 kg (141 lb 12.1 oz)      ROS:   Gen: Appetite good,  weight loss 21 lbs  Eyes: +visual disturbances- wears glasses   Resp: no SOB or SEGUNDO, no cough  Cardiac: No palpitations, chest pain, no edema   GI: No nausea or vomiting, diarrhea, constipation, or abdominal pain.  /GYN: 1-2+ nocturia, no burning or pain.   MS/Neuro: right hand numbness; Gait unsteady, speech clear  Psych: Denies drug/ETOH abuse, no hx of depression.  Other systems: negative.    PE:  GENERAL: Well developed, well nourished.  PSYCH: AAOx3, appropriate mood and affect, pleasant expression, conversant, appears relaxed, well groomed.   EYES:  Conjunctiva, corneas clear  NECK: Supple, trachea midline  NEURO: Gait unsteady  SKIN:   no acanthosis nigracans.  FOOT EXAMINATION: 6/23  No foot deformity, corns or callus formation,  nails in good condition and well trimmed, no interspace maceration or ulceration noted.  Decreased hair growth present over toes/feet. Positive vibratory response to 128 Hz tuning fork bilaterally.  Protective sensation intact with 10 gram monofilament.  +2 dorsalis pedis and posterior pulses noted.    Lab Results   Component Value Date    MICALBCREAT 195.8 (H) 02/22/2023       Hemoglobin A1C   Date Value Ref Range Status   05/03/2023 10.2 (H) 4.5 - 6.2 % Final     Comment:     According to ADA guidelines, hemoglobin A1C <7.0% represents  optimal control in non-pregnant diabetic patients.  Different  metrics may apply to specific populations.   Standards of Medical Care in Diabetes - 2016.    For the purpose of screening for the presence of diabetes:  <5.7%     Consistent with the absence of diabetes  5.7-6.4%  Consistent with increasing risk for diabetes   (prediabetes)  >or=6.5%  Consistent with diabetes    Currently no consensus exists for use of hemoglobin A1C  for diagnosis of diabetes for children.     02/22/2023 8.9 (H) 4.0 - 5.6 % Final     Comment:     ADA Screening Guidelines:  5.7-6.4%  Consistent with prediabetes  >or=6.5%  Consistent with diabetes    High levels of fetal hemoglobin interfere with the HbA1C  assay. Heterozygous hemoglobin variants (HbS, HgC, etc)do  not significantly interfere with this assay.   However, presence of multiple variants may affect accuracy.     12/10/2022 8.6 (H) 4.0 - 5.6 % Final     Comment:     ADA Screening Guidelines:  5.7-6.4%  Consistent with prediabetes  >or=6.5%  Consistent with diabetes    High levels of fetal hemoglobin interfere with the HbA1C  assay. Heterozygous hemoglobin variants (HbS, HgC, etc)do  not significantly interfere with this assay.   However, presence of multiple  variants may affect accuracy.          ASSESSMENT and PLAN:    1. T2DM with hyperglycemia, DM nephropathy, Dm retinopathy- A1c tis elevated.  Increase Tresiba to 50 units nightly. Continue Novolog 12 units with correction before meals. Referral to [facility].  Continue to declines personal sensor-  would rather stick her fingers   Check bg 4 times a day    Discussed A1c and BG goals.   - takes  Arb, statin    2. HTN - controlled, continue meds as previously prescribed and monitor. Has not taken her meds yet this am    3. HLP - on statin therapy, LFTs WNL    4. COPD- No current steroids    5.  Vitamin d deficiency-Continue ergocalciferol 11782 IU weekly, check lab w RTC    7. CVA- optimize bg    Referral to de  Follow-up: in 3 months with lab prior

## 2023-06-27 NOTE — PROGRESS NOTES
[facility]  Occupational Therapy Treatment Note     Date: 6/27/2023  Name: Steff Johnson  Clinic Number: [id number]    Therapy Diagnosis:   Encounter Diagnoses   Name Primary?    Decreased strength Yes    Decreased range of motion     Decreased independence with activities of daily living     Impaired functional mobility, balance, and endurance     Alteration in instrumental activities of daily living (IADL)      Physician: Vilma Rodriguez MD    Physician Orders: Eval and Treat  Medical Diagnosis: I63.9 (ICD-10-CM) - Ischemic stroke  Evaluation Date: 6/8/2023  Insurance Authorization Period Expiration: 7/6/2023  Progress Notes Due: 7/7/23, 8/4/23  Plan of Care Certification Period: 9/1/2023  Date of Return to MD: to be determined   Visit # / Visits authorized: 2/20 (+eval)  FOTO: to be assessed     Precautions:  Standard, Fall, COPM, Diabetes    Time In: 1620  Time Out: 1705   Total Billable Time: 45    Subjective     Pt reports: Steff says she feels like she usually does, but then she also reports feeling not quite herself.    At end of session, caregiver agreed that she wasn't as good today as on last visit, but also noted that she has good & bad days and today doesn't look markedly worse than her typical bad day.     She was compliant with home exercise program given last session. (Not given)  Response to previous treatment:1st treatment   Functional change: no changes per pt report since evaluation     Pain: 0/10  Location:    Objective     Objective Measures updated at progress report unless specified.    At 1633, HW=321/82, HR=77, SpO2=97%.     Treatment     Steff received the treatments listed below:      therapeutic exercises to develop strength, endurance, and ROM for 0 minutes including: NA     neuromuscular re-education activities to improve: Balance and Coordination for 0 minutes. The following activities were included:  NA    therapeutic activities to improve  "functional performance for 45 minutes, including:  Total A to prep for transfer. Attempted sit<>stand x4 throughout session. She was unable on any attempt, even with max A.  Max-total A for scoot-pivot transfer.  Max-total A for forward/ backward scooting.  Max A for pelvic tilting in all directions with difficulty following instructions.   Seated edge of mat, she demonstrated right shoulder flexion ~60*.  Speech was slurred, but dysarthria is noted during previous visits.   Increased time required for all activities as patient slow to respond to instructions.   At end of session, OT discussed concerns with caregiver who reported as above.     Patient Education and Home Exercises     Education provided:   - Progress towards goals   - OT reviewed signs or symptoms of CVA with caregiver and encouraged him to seek medical care if she demonstrated these 'worse' than they currently are, or if her vitals are worse. He v/u.  Written Home Exercises Provided: not given today      Assessment     Pt would continue to benefit from skilled OT. Steff presented very different today from previous session, requiring significantly more assistance. She also had difficulty communicating, but her dysarthria is documented in previous sessions. Medical attention was not sought as her vitals were good and her caregiver noted no significant difference today compared to a typical "bad day" for patient. OT did review signs or symptoms of CVA with caregiver and he verbalized understanding of when to seek medical attention for patient.     Steff is progressing slowly towards her goals and there are no updates to goals at this time. Pt prognosis is Fair to Good.     Pt will continue to benefit from skilled outpatient occupational therapy to address the deficits listed in the problem list on initial evaluation provide pt/family education and to maximize pt's level of independence in the home and community environment.     Pt's spiritual, " cultural and educational needs considered and pt agreeable to plan of care and goals.    Anticipated barriers to occupational therapy: communication; does not drive     Goals:     Short Term Goals (6 weeks) Status When Last Assessed  Progressing/ Met   1) Pt will complete initial HEP with Min A  TBA  (progressing 6/27/2023)   2) PT will increase shoulder flexion and abduction range of motion by 8 degrees for increased ability for overhead reaching.  Shoulder flex 0-180 108 attempts to lift higher by going into abduction    Shoulder Abd 0-180 120 - min leaning to left side     (progressing 6/27/2023)   3) Bed mobility / Mod I  Min A  progressing 6/8/2023    4) Left and right  strength to improve by 5 lbs to increase independence with IADL L=33  R=21 (progressing 6/27/2023)   5) Upper + Lower body dressing / Mod I  Mod A  (progressing 6/27/2023)   6) Pt will complete bathing, seated, with supervision  Mod A  (progressing 6/27/2023)         LongTerm Goals (12 weeks) Status When Last Assessed  Progressing/ Met   1) Pt will increase FMC as evidenced by a 10 second time decrease with bilateral upper extremities during 9 Hole peg test  L= 58s  R=1.36s (progressing 6/27/2023)   2) Pt will improve right shoulder flexion by 20 degrees for increased independence with overhead reaching.  Shoulder flex 0-180 108 attempts to lift higher by going into abduction    Shoulder Abd 0-180 120 - min leaning to left side     (progressing 6/27/2023)   3) Left and right  strength to improve by 8 lbs to increase independence with IADL L=33  R=21 (progressing 6/27/2023)   4) Right shoulder strength to improve to 4/5 to increase independence with IADL participation 3-/5 (progressing 6/27/2023)   5) left and right upper extremity gross motor coordination (GMC) to improve as evidenced by 10 block increase bilaterally during based on a Box & Block to increase independence with ADL. L=18  R=10 (progressing 6/27/2023)    6) Will self report  completion of IADL of choice with Min A  Pt reported she wanted to cook, clean, or do laundry (progressing 6/27/2023)       Additional functional goals to be added as pt progresses and per her priorities.     Plan   Certification Period/Plan of care expiration: 6/8/2023 to 9/1/2023.     Outpatient Occupational Therapy 2 times weekly for 12 weeks to include the following interventions: Manual Therapy, Neuromuscular Re-ed, Patient Education, Self Care, Therapeutic Activities, and Therapeutic Exercise.    Updates/Grading for next session: coordination, transfers    Teetee Sequeira, OT

## 2023-06-30 ENCOUNTER — CLINICAL SUPPORT (OUTPATIENT)
Dept: REHABILITATION | Facility: HOSPITAL | Age: 63
End: 2023-06-30
Payer: MEDICARE

## 2023-06-30 DIAGNOSIS — R49.0 DYSPHONIA: ICD-10-CM

## 2023-06-30 DIAGNOSIS — R47.01 APHASIA: ICD-10-CM

## 2023-06-30 DIAGNOSIS — R41.841 COGNITIVE COMMUNICATION DEFICIT: ICD-10-CM

## 2023-06-30 DIAGNOSIS — R47.1 DYSARTHRIA: Primary | ICD-10-CM

## 2023-06-30 PROCEDURE — 92507 TX SP LANG VOICE COMM INDIV: CPT | Mod: HCNC,PN

## 2023-06-30 NOTE — PROGRESS NOTES
[facility]  Speech Therapy Treatment Note- Neurological Rehabilitation  Date: 6/30/2023     Name: Steff Johnson   MRN: [id number]   Therapy Diagnosis:   Encounter Diagnoses   Name Primary?    Dysarthria Yes    Cognitive communication deficit     Aphasia     Dysphonia      Physician: Cristina Ren MD  Physician Orders: Ambulatory Referral to Speech Therapy   Medical Diagnosis:   I63.9 (ICD-10-CM) - Ischemic stroke   R47.1 (ICD-10-CM) - Dysarthria     Visit #/ Visits Authorized: 2/20  Date of Evaluation:  5/31/2023  Insurance Authorization Period: 6/16/2023 - 12/31/2023  Plan of Care Expiration Date: 8/23/2023  Extended Plan of Care:  n/a   Progress Note: 7/12/2023     Time In:  2:35 PM  Time Out:  3:15 PM  Total Billable Time: 40 minutes     Precautions: Standard, Fall, Diabetes , Cognition, and Communication  Subjective:   Patient reports:   Patient's friend, Lowell, brought her to therapy.     Following session Lowell agreed there are times it looks like the patient knows what she needs to do, but her body is not doing what she wants. Recommend monitoring for apraxia.     She was compliant to home exercise program.   Response to previous treatment: good  Pain Scale: 0/10 on a Visual Analog Scale currently.  Pain Location: 0   Patient with no pain behaviors.   Objective:   TIMED  Procedure Min.   Cognitive Therapeutic Interventions, first 15 minutes CPT 73110  -   Cognitive Therapeutic Interventions, each additional 15 minutes CPT 96427  -         UNTIMED  Procedure   Speech- Language- Voice Therapy                                      40       Short Term Goals: (4 weeks) Current Progress:   1. Patient will answer complex yes/no questions with 80% accuracy to target auditory comprehension.    Progressing/ Not Met 6/30/2023   Comparative yes/no questions = 65% accuracy.    2. Patient will follow 2-3 step directions with 80% accuracy across three sessions.       Progressing/ Not Met 6/30/2023    2-step motor directions = 70% accuracy independently, 100% accuracy given a model.    3. Patient will identify at least 4 word finding strategies across three sessions.      Progressing/ Not Met 2023   -Not addressed this session.      4. Patient will identify x10 items within a concrete divergent naming task within 1-2 minutes.       Progressing/ Not Met 2023   Fruit = x3 in 2 minutes. Additional items added given cues.    5. Patient will participate in Verb Network Strength Training for 2-4 verbs per session with min A to improve word fluency.     Progressing/ Not Met 2023   -Not addressed this session.      6. Patient will describe a picture using at least 5/6 characteristics given a Semantic Feature Analysis visual in 3/3 trials per session to address word finding strategies.      Progressing/ Not Met 2023   -Not addressed this session.      7. Patient will answer orientation questions: month, year, day of the week, place, etiology with 100% accuracy across three sessions.      Progressing/ Not Met 2023   -Not addressed this session.      8. Patient will complete low level functional problem solving tasks with 80% accuracy given minimal verbal and visual cues.    Progressing/Not Met 2023 -Not addressed this session.     9. Complete motor speech evaluation to evaluate dysarthria.    Met 2023 Met    10. Patient will utilize diaphragmatic breathing to produce a 6 second exhalation with moderate facilitation by Speech Language Pathologist.     Progressing/Not Met 2023 Limited ability to inhale for more than 1-2 seconds.    11. Patient will recall 3/4 clear speech strategies given minimal cues.     Progressing/Not Met 2023 -Not addressed this session.         Patient Education/Response:   Patient educated regarding the followin. Patient asking for other's to model a direction when having difficulties following a motor direction.  2. Importance of categories and  sub-categories for word finding.       Home program established: yes-Provided comparative and sequential yes/no questions.  Patient and Lowell verbalized understanding to all above education provided.     See Electronic Medical Record under Patient Instructions for exercises provided throughout therapy.  Assessment:   Session focused on speech and voice evaluation. Patient presented with several characteristics apraxia of speech during evaluation at the last session. Patient was noted to exhibit several instances during action direction following where she appeared to understand the direction but was unable to perform the direction unless given a model. Recommend monitoring for apraxia. Patient with fair ability to answer complex comparative yes/no questions. Very limited divergent naming. Patient noted to be fatigued as evidenced by yawning and closing her eyes. Limited self-initiation of language.     Steff is progressing well towards her goals. Current goals remain appropriate. Goals to be updated as necessary.     Patient prognosis is Good. Patient will continue to benefit from skilled outpatient speech and language therapy to address the deficits listed in the problem list on initial evaluation, provide patient/family education and to maximize patient's level of independence in the home and community environment.   Medical necessity is demonstrated by the following IMPAIRMENTS:  Aphasia: decreased content words and significant word finding difficulty in all situations severely limiting functional communication with both familiar and unfamiliar communication partners to relay medically and safety relevant information in a timely manner in a state of emergency.    Cognition: Deficits in executive functioning, attention, and memory prevent the pt from relaying medically and safety relevant information in a timely manner in a state of emergency.   Motor speech: Decreased speech intelligibility results in decreased  efficiency communicating medical and social wants and needs in the case of emergency.    Voice: Patient with decreased vocal intensity resulting in severely decreased speech intelligibility. Reportedly, this worsens over the phone negatively impacting his ability to effectively and efficiently explain an emergency situation to emergency operators via phone or in person    Barriers to Therapy: severity of deficits  Patient's spiritual, cultural and educational needs considered and patient agreeable to plan of care and goals.  Plan:   Continue Plan of Care with focus on rehabilitation and compensation for language, cognition, speech, and voice.    Consider recommendation for ENT.       Nancy Dodson CCC-SLP   6/30/2023

## 2023-07-03 ENCOUNTER — CLINICAL SUPPORT (OUTPATIENT)
Dept: REHABILITATION | Facility: HOSPITAL | Age: 63
End: 2023-07-03
Payer: MEDICARE

## 2023-07-03 ENCOUNTER — DOCUMENTATION ONLY (OUTPATIENT)
Dept: REHABILITATION | Facility: HOSPITAL | Age: 63
End: 2023-07-03
Payer: MEDICARE

## 2023-07-03 ENCOUNTER — HOSPITAL ENCOUNTER (INPATIENT)
Facility: HOSPITAL | Age: 63
LOS: 3 days | Discharge: NURSING, REHAB OR LONG-TERM CARE | DRG: 065 | End: 2023-07-06
Attending: EMERGENCY MEDICINE | Admitting: HOSPITALIST
Payer: MEDICARE

## 2023-07-03 DIAGNOSIS — Z74.09 IMPAIRED FUNCTIONAL MOBILITY, BALANCE, AND ENDURANCE: ICD-10-CM

## 2023-07-03 DIAGNOSIS — R53.1 DECREASED STRENGTH: Primary | ICD-10-CM

## 2023-07-03 DIAGNOSIS — I63.9 CEREBROVASCULAR ACCIDENT (CVA), UNSPECIFIED MECHANISM: Primary | ICD-10-CM

## 2023-07-03 DIAGNOSIS — Z78.9 DECREASED INDEPENDENCE WITH ACTIVITIES OF DAILY LIVING: ICD-10-CM

## 2023-07-03 DIAGNOSIS — M25.60 DECREASED RANGE OF MOTION: ICD-10-CM

## 2023-07-03 DIAGNOSIS — Z78.9 ALTERATION IN INSTRUMENTAL ACTIVITIES OF DAILY LIVING (IADL): ICD-10-CM

## 2023-07-03 LAB
ALBUMIN SERPL BCP-MCNC: 3.9 G/DL (ref 3.5–5.2)
ALP SERPL-CCNC: 79 U/L (ref 55–135)
ALT SERPL W/O P-5'-P-CCNC: 34 U/L (ref 10–44)
ANION GAP SERPL CALC-SCNC: 12 MMOL/L (ref 8–16)
AST SERPL-CCNC: 25 U/L (ref 10–40)
BASOPHILS # BLD AUTO: 0.07 K/UL (ref 0–0.2)
BASOPHILS NFR BLD: 0.7 % (ref 0–1.9)
BILIRUB SERPL-MCNC: 0.4 MG/DL (ref 0.1–1)
BILIRUB UR QL STRIP: NEGATIVE
BUN SERPL-MCNC: 27 MG/DL (ref 8–23)
CALCIUM SERPL-MCNC: 9.5 MG/DL (ref 8.7–10.5)
CHLORIDE SERPL-SCNC: 114 MMOL/L (ref 95–110)
CLARITY UR: CLEAR
CO2 SERPL-SCNC: 18 MMOL/L (ref 23–29)
COLOR UR: YELLOW
CREAT SERPL-MCNC: 1.4 MG/DL (ref 0.5–1.4)
DIFFERENTIAL METHOD: ABNORMAL
EOSINOPHIL # BLD AUTO: 0.2 K/UL (ref 0–0.5)
EOSINOPHIL NFR BLD: 1.7 % (ref 0–8)
ERYTHROCYTE [DISTWIDTH] IN BLOOD BY AUTOMATED COUNT: 14.9 % (ref 11.5–14.5)
EST. GFR  (NO RACE VARIABLE): 42 ML/MIN/1.73 M^2
GLUCOSE SERPL-MCNC: 123 MG/DL (ref 70–110)
GLUCOSE UR QL STRIP: NEGATIVE
HCT VFR BLD AUTO: 34.4 % (ref 37–48.5)
HGB BLD-MCNC: 11.9 G/DL (ref 12–16)
HGB UR QL STRIP: NEGATIVE
IMM GRANULOCYTES # BLD AUTO: 0.03 K/UL (ref 0–0.04)
IMM GRANULOCYTES NFR BLD AUTO: 0.3 % (ref 0–0.5)
KETONES UR QL STRIP: NEGATIVE
LEUKOCYTE ESTERASE UR QL STRIP: NEGATIVE
LYMPHOCYTES # BLD AUTO: 2.4 K/UL (ref 1–4.8)
LYMPHOCYTES NFR BLD: 25.2 % (ref 18–48)
MCH RBC QN AUTO: 28 PG (ref 27–31)
MCHC RBC AUTO-ENTMCNC: 34.6 G/DL (ref 32–36)
MCV RBC AUTO: 81 FL (ref 82–98)
MONOCYTES # BLD AUTO: 0.8 K/UL (ref 0.3–1)
MONOCYTES NFR BLD: 8.1 % (ref 4–15)
NEUTROPHILS # BLD AUTO: 6.1 K/UL (ref 1.8–7.7)
NEUTROPHILS NFR BLD: 64 % (ref 38–73)
NITRITE UR QL STRIP: NEGATIVE
NRBC BLD-RTO: 0 /100 WBC
PH UR STRIP: 6 [PH] (ref 5–8)
PLATELET # BLD AUTO: 396 K/UL (ref 150–450)
PMV BLD AUTO: 10.5 FL (ref 9.2–12.9)
POCT GLUCOSE: 131 MG/DL (ref 70–110)
POTASSIUM SERPL-SCNC: 4.8 MMOL/L (ref 3.5–5.1)
PROT SERPL-MCNC: 8.3 G/DL (ref 6–8.4)
PROT UR QL STRIP: ABNORMAL
RBC # BLD AUTO: 4.25 M/UL (ref 4–5.4)
SODIUM SERPL-SCNC: 144 MMOL/L (ref 136–145)
SP GR UR STRIP: 1.02 (ref 1–1.03)
URN SPEC COLLECT METH UR: ABNORMAL
UROBILINOGEN UR STRIP-ACNC: NEGATIVE EU/DL
WBC # BLD AUTO: 9.46 K/UL (ref 3.9–12.7)

## 2023-07-03 PROCEDURE — 36415 COLL VENOUS BLD VENIPUNCTURE: CPT | Performed by: EMERGENCY MEDICINE

## 2023-07-03 PROCEDURE — 63600175 PHARM REV CODE 636 W HCPCS: Performed by: EMERGENCY MEDICINE

## 2023-07-03 PROCEDURE — 85025 COMPLETE CBC W/AUTO DIFF WBC: CPT | Performed by: EMERGENCY MEDICINE

## 2023-07-03 PROCEDURE — 97530 THERAPEUTIC ACTIVITIES: CPT | Mod: HCNC,PN

## 2023-07-03 PROCEDURE — 96360 HYDRATION IV INFUSION INIT: CPT

## 2023-07-03 PROCEDURE — 99291 CRITICAL CARE FIRST HOUR: CPT

## 2023-07-03 PROCEDURE — 11000001 HC ACUTE MED/SURG PRIVATE ROOM

## 2023-07-03 PROCEDURE — 81003 URINALYSIS AUTO W/O SCOPE: CPT | Performed by: EMERGENCY MEDICINE

## 2023-07-03 PROCEDURE — 80053 COMPREHEN METABOLIC PANEL: CPT | Performed by: EMERGENCY MEDICINE

## 2023-07-03 PROCEDURE — 25000003 PHARM REV CODE 250: Performed by: EMERGENCY MEDICINE

## 2023-07-03 PROCEDURE — 63600175 PHARM REV CODE 636 W HCPCS: Performed by: NURSE PRACTITIONER

## 2023-07-03 RX ORDER — METOPROLOL SUCCINATE 50 MG/1
100 TABLET, EXTENDED RELEASE ORAL DAILY
Status: DISCONTINUED | OUTPATIENT
Start: 2023-07-04 | End: 2023-07-06 | Stop reason: HOSPADM

## 2023-07-03 RX ORDER — POLYETHYLENE GLYCOL 3350 17 G/17G
17 POWDER, FOR SOLUTION ORAL 2 TIMES DAILY PRN
Status: DISCONTINUED | OUTPATIENT
Start: 2023-07-04 | End: 2023-07-06 | Stop reason: HOSPADM

## 2023-07-03 RX ORDER — CLOPIDOGREL BISULFATE 75 MG/1
75 TABLET ORAL
Status: COMPLETED | OUTPATIENT
Start: 2023-07-03 | End: 2023-07-03

## 2023-07-03 RX ORDER — HYDRALAZINE HYDROCHLORIDE 25 MG/1
100 TABLET, FILM COATED ORAL 3 TIMES DAILY
Status: DISCONTINUED | OUTPATIENT
Start: 2023-07-03 | End: 2023-07-06 | Stop reason: HOSPADM

## 2023-07-03 RX ORDER — LABETALOL HYDROCHLORIDE 5 MG/ML
10 INJECTION, SOLUTION INTRAVENOUS
Status: DISCONTINUED | OUTPATIENT
Start: 2023-07-04 | End: 2023-07-06 | Stop reason: HOSPADM

## 2023-07-03 RX ORDER — AMLODIPINE BESYLATE 5 MG/1
10 TABLET ORAL DAILY
Status: DISCONTINUED | OUTPATIENT
Start: 2023-07-04 | End: 2023-07-06 | Stop reason: HOSPADM

## 2023-07-03 RX ORDER — HYDRALAZINE HYDROCHLORIDE 20 MG/ML
10 INJECTION INTRAMUSCULAR; INTRAVENOUS ONCE
Status: COMPLETED | OUTPATIENT
Start: 2023-07-03 | End: 2023-07-03

## 2023-07-03 RX ORDER — CLONIDINE HYDROCHLORIDE 0.1 MG/1
0.1 TABLET ORAL DAILY
Status: ON HOLD | COMMUNITY
End: 2023-07-05 | Stop reason: HOSPADM

## 2023-07-03 RX ORDER — LOSARTAN POTASSIUM 100 MG/1
100 TABLET ORAL DAILY
Status: DISCONTINUED | OUTPATIENT
Start: 2023-07-04 | End: 2023-07-06 | Stop reason: HOSPADM

## 2023-07-03 RX ORDER — NAPROXEN SODIUM 220 MG/1
81 TABLET, FILM COATED ORAL DAILY
Status: DISCONTINUED | OUTPATIENT
Start: 2023-07-04 | End: 2023-07-06 | Stop reason: HOSPADM

## 2023-07-03 RX ORDER — ONDANSETRON 2 MG/ML
4 INJECTION INTRAMUSCULAR; INTRAVENOUS EVERY 12 HOURS PRN
Status: DISCONTINUED | OUTPATIENT
Start: 2023-07-04 | End: 2023-07-06 | Stop reason: HOSPADM

## 2023-07-03 RX ORDER — SODIUM CHLORIDE 0.9 % (FLUSH) 0.9 %
10 SYRINGE (ML) INJECTION
Status: DISCONTINUED | OUTPATIENT
Start: 2023-07-04 | End: 2023-07-06 | Stop reason: HOSPADM

## 2023-07-03 RX ORDER — LATANOPROST 50 UG/ML
1 SOLUTION/ DROPS OPHTHALMIC DAILY
Status: DISCONTINUED | OUTPATIENT
Start: 2023-07-04 | End: 2023-07-06 | Stop reason: HOSPADM

## 2023-07-03 RX ORDER — ATORVASTATIN CALCIUM 40 MG/1
80 TABLET, FILM COATED ORAL NIGHTLY
Status: DISCONTINUED | OUTPATIENT
Start: 2023-07-04 | End: 2023-07-06 | Stop reason: HOSPADM

## 2023-07-03 RX ORDER — CLONIDINE HYDROCHLORIDE 0.1 MG/1
0.1 TABLET ORAL 3 TIMES DAILY
Status: DISCONTINUED | OUTPATIENT
Start: 2023-07-03 | End: 2023-07-06 | Stop reason: HOSPADM

## 2023-07-03 RX ORDER — AMOXICILLIN 250 MG
1 CAPSULE ORAL 2 TIMES DAILY
Status: DISCONTINUED | OUTPATIENT
Start: 2023-07-04 | End: 2023-07-06 | Stop reason: HOSPADM

## 2023-07-03 RX ADMIN — CLOPIDOGREL BISULFATE 75 MG: 75 TABLET ORAL at 08:07

## 2023-07-03 RX ADMIN — SODIUM CHLORIDE, POTASSIUM CHLORIDE, SODIUM LACTATE AND CALCIUM CHLORIDE 500 ML: 600; 310; 30; 20 INJECTION, SOLUTION INTRAVENOUS at 05:07

## 2023-07-03 RX ADMIN — HYDRALAZINE HYDROCHLORIDE 10 MG: 20 INJECTION INTRAMUSCULAR; INTRAVENOUS at 10:07

## 2023-07-03 NOTE — PROGRESS NOTES
[facility]  Occupational Therapy Treatment Note     Date: 7/3/2023  Name: Steff Johnson  Clinic Number: [id number]    Therapy Diagnosis:   Encounter Diagnoses   Name Primary?    Decreased strength Yes    Decreased range of motion     Decreased independence with activities of daily living     Impaired functional mobility, balance, and endurance     Alteration in instrumental activities of daily living (IADL)      Physician: Vilma Rodriguez MD    Physician Orders: Eval and Treat  Medical Diagnosis: I63.9 (ICD-10-CM) - Ischemic stroke  Evaluation Date: 6/8/2023  Insurance Authorization Period Expiration: 7/6/2023  Progress Notes Due: 7/7/23, 8/4/23  Plan of Care Certification Period: 9/1/2023  Date of Return to MD: to be determined   Visit # / Visits authorized: 3/20 (+eval)  FOTO: to be assessed     Precautions:  Standard, Fall, COPM, Diabetes    Time In: 1:50 PM   Time Out: 2:35 PM   Total Billable Time: 45 minutes     Subjective     Pt reports: Steff states that she feels somewhat okay today. She reports that her speech is slurring more than usual. No headache or nausea reported.     She was compliant with home exercise program given last session. (Not given)  Response to previous treatment:1st treatment   Functional change: no changes per pt report since evaluation     Pain: 0/10  Location:    Objective     Objective Measures updated at progress report unless specified.    At 1:55 PM, QH=110/73, HR=82, SpO2=97%.     Treatment     Steff received the treatments listed below:      therapeutic exercises to develop strength, endurance, and ROM for 0 minutes including: NA     neuromuscular re-education activities to improve: Balance and Coordination for 0 minutes. The following activities were included:  NA    therapeutic activities to improve functional performance for 45 minutes, including:    Total A to prep for transfer.     Sit <> stand x2 / Max A     Standing transfer / Max A      Completed [facility] closed chain table slides (forward/backward - side to side - circles)    -difficulty sitting upright sitting unsupported, backward lean noted     Sit <> supine / Max A     Gentle mobilization and stretching of soft-tissues throughout the right shoulder, including levator, serratus anterior, subscapularis, anterior complex, pec major, lats and combined internal rotators to allow for increased PROM.     Gentle mobilization of joints involving the right UE phalanges, metacarpals, carpals, radius and ulna to facilitate increases in passive movement. Gentle stretching of the soft tissues of the right wrist and hand to decrease edema and improve PROM, potentially allowing for increases in active movement.      Place and hold of right upper extremity - unable to hold     Attempted discussion with pt about performance 1-2 weeks ago versus today. OT noticing vast differences in performance. Pt with mod difficulty expressing concerns due to dysarthria (which has also seemingly worsened). OT called PT and ST into the room to discuss her performance with them them. Due to the discrepancy in performance, she was advised to go to the emergency room to get checked. Supervisor, Kamryn Werner DPT, was able to take her across the street to the hospital due to this change in status.     Patient Education and Home Exercises     Education provided:   - Progress towards goals   - OT reviewed signs or symptoms of CVA with caregiver and encouraged him to seek medical care if she demonstrated these 'worse' than they currently are, or if her vitals are worse. He v/u.  Written Home Exercises Provided: not given today      Assessment     Pt would continue to benefit from skilled OT. Steff presented very different today from previous session, requiring significantly more assistance. She also had difficulty communicating due to dysarthria, which has seemingly worsened as well. Vitals taken and documented in objective. Due to  change in status,she was advised to go to the emergency room. This change in status includes going from Min A transfers to Max A along with decreased movement of right upper extremity and more difficulty communicating. Supervisor of [facility] was able to take her across the street (parking lot) to the emergency department to be checked. Her caregiver joined her when he arrived.     Steff is progressing slowly towards her goals and there are no updates to goals at this time. Pt prognosis is Fair to Good.     Pt will continue to benefit from skilled outpatient occupational therapy to address the deficits listed in the problem list on initial evaluation provide pt/family education and to maximize pt's level of independence in the home and community environment.     Pt's spiritual, cultural and educational needs considered and pt agreeable to plan of care and goals.    Anticipated barriers to occupational therapy: communication; does not drive     Goals:     Short Term Goals (6 weeks) Status When Last Assessed  Progressing/ Met   1) Pt will complete initial HEP with Min A  TBA  (progressing 7/3/2023)   2) PT will increase shoulder flexion and abduction range of motion by 8 degrees for increased ability for overhead reaching.  Shoulder flex 0-180 108 attempts to lift higher by going into abduction    Shoulder Abd 0-180 120 - min leaning to left side     (progressing 7/3/2023)   3) Bed mobility / Mod I  Min A  progressing 6/8/2023    4) Left and right  strength to improve by 5 lbs to increase independence with IADL L=33  R=21 (progressing 7/3/2023)   5) Upper + Lower body dressing / Mod I  Mod A  (progressing 7/3/2023)   6) Pt will complete bathing, seated, with supervision  Mod A  (progressing 7/3/2023)         LongTerm Goals (12 weeks) Status When Last Assessed  Progressing/ Met   1) Pt will increase FMC as evidenced by a 10 second time decrease with bilateral upper extremities during 9 Hole peg test  L=  58s  R=1.36s (progressing 7/3/2023)   2) Pt will improve right shoulder flexion by 20 degrees for increased independence with overhead reaching.  Shoulder flex 0-180 108 attempts to lift higher by going into abduction    Shoulder Abd 0-180 120 - min leaning to left side     (progressing 7/3/2023)   3) Left and right  strength to improve by 8 lbs to increase independence with IADL L=33  R=21 (progressing 7/3/2023)   4) Right shoulder strength to improve to 4/5 to increase independence with IADL participation 3-/5 (progressing 7/3/2023)   5) left and right upper extremity gross motor coordination (GMC) to improve as evidenced by 10 block increase bilaterally during based on a Box & Block to increase independence with ADL. L=18  R=10 (progressing 7/3/2023)    6) Will self report completion of IADL of choice with Min A  Pt reported she wanted to cook, clean, or do laundry (progressing 7/3/2023)       Additional functional goals to be added as pt progresses and per her priorities.     Plan   Certification Period/Plan of care expiration: 6/8/2023 to 9/1/2023.     Outpatient Occupational Therapy 2 times weekly for 12 weeks to include the following interventions: Manual Therapy, Neuromuscular Re-ed, Patient Education, Self Care, Therapeutic Activities, and Therapeutic Exercise.    Updates/Grading for next session: coordination, transfers    Michelle Vera OT

## 2023-07-03 NOTE — ED NOTES
Pt has hx of CVAS. Pt has increased weakness and slurred speech over the weekend. Pt has equal sensations on both sides. Pt is able to lift and hold LUE, LLE, and RLE. Pt is contracted on right arm due to residual of previous stroke. Pt is alert awake and oriented x4.

## 2023-07-03 NOTE — PROGRESS NOTES
Pt arrived for OT, Speech, and PT today with OT noticing a decline in function with  testing and transfers worsened from Min A last week with OT and at evaluation with PT to Max A today and inability to walk with AD. She needed to use clinic wheelchair today to get into therapy vs Rollator. She was wheeled to Emergency Department by supervisor for assessment due to change in status.     Patient's significant other arrived to clinic after  called him to return. Discussed changes in transfers and gait. He believes that is due to not having physical therapy in the last month. Pt has not yet had physical therapy due to no shows to appointments or being 30+ minutes late for 45 minute appointments.     Alisa Lopez, PT, DPT

## 2023-07-03 NOTE — ED PROVIDER NOTES
Encounter Date: 7/3/2023       History     Chief Complaint   Patient presents with    Weakness     Multiple cva's / slurred speech and weakness started over weekend      63-year-old female with a past medical history of a CVA, hypertension, COPD, and diabetes mellitus presents for evaluation of weakness.  The patient is recovering from a recent stroke in May and was at physical therapy today, when the therapist noticed that the patient's right-sided weakness was worse than her last therapy session done on June 27th.  The patient reports that she does not feel like her weakness is worse but that her slurred speech is worse since that time.  She reports that it has been like this for several days.  The patient denies any headache, nausea/vomiting, chest pain, shortness of breath, fever/chills, dysuria, or hematuria.  There are no alleviating or aggravating factors.  The patient's significant other reports that he does not notice any worsening weakness with the patient.    Review of patient's allergies indicates:  No Known Allergies  Past Medical History:   Diagnosis Date    Arthritis     Complete tear of left rotator cuff 11/09/2017    COPD (chronic obstructive pulmonary disease)     COVID-19 infection 07/02/2021    Diabetes mellitus     H/o Cerebrovascular accident (CVA) of left pontine structure 12/12/2019    Hypertension     Personal history of colonic polyps     Stroke     Wears glasses      Past Surgical History:   Procedure Laterality Date    COLONOSCOPY N/A 4/11/2017    Procedure: COLONOSCOPY;  Surgeon: Jared Antonio MD;  Location: [facility];  Service: Endoscopy;  Laterality: N/A;    HYSTERECTOMY      OOPHORECTOMY      SHOULDER SURGERY      R    TRANSESOPHAGEAL ECHOCARDIOGRAM WITH POSSIBLE CARDIOVERSION (GT W/ POSS CARDIOVERSION) N/A 5/4/2023    Procedure: Transesophageal echo (GT) intra-procedure log documentation;  Surgeon: Blade Phillip MD;  Location: [facility] CATH/EP LAB;  Service: Cardiology;   Laterality: N/A;     Family History   Problem Relation Age of Onset    Diabetes Mother     Asthma Mother     Hypertension Mother     Diabetes Father     Diabetes Brother     Hypertension Brother     Anemia Daughter     Glaucoma Neg Hx     Macular degeneration Neg Hx     Retinal detachment Neg Hx      Social History     Tobacco Use    Smoking status: Never    Smokeless tobacco: Never   Substance Use Topics    Alcohol use: No    Drug use: No     Review of Systems   Constitutional:  Negative for chills and fever.   HENT:  Negative for congestion.    Respiratory:  Negative for cough and shortness of breath.    Cardiovascular:  Negative for chest pain.   Gastrointestinal:  Negative for abdominal pain, nausea and vomiting.   Genitourinary:  Negative for dysuria.   Musculoskeletal:  Negative for gait problem.   Skin:  Negative for color change.   Neurological:  Positive for speech difficulty and weakness. Negative for dizziness and numbness.   Psychiatric/Behavioral:  Negative for agitation.      Physical Exam     Initial Vitals [07/03/23 1456]   BP Pulse Resp Temp SpO2   (!) 165/74 80 18 98.1 °F (36.7 °C) 97 %      MAP       --         Physical Exam    Nursing note and vitals reviewed.  Constitutional: She appears well-developed and well-nourished.   HENT:   Head: Atraumatic.   Eyes: EOM are normal. Pupils are equal, round, and reactive to light.   Neck:   Normal range of motion.  Cardiovascular:  Normal rate and regular rhythm.           Pulmonary/Chest: Breath sounds normal.   Abdominal: Abdomen is soft. Bowel sounds are normal. She exhibits no distension. There is no abdominal tenderness. There is no rebound and no guarding.   Musculoskeletal:         General: Normal range of motion.      Right shoulder: Normal.      Left shoulder: Normal.      Cervical back: Normal range of motion.     Neurological: She is alert and oriented to person, place, and time. GCS score is 15. GCS eye subscore is 4. GCS verbal subscore is 5.  GCS motor subscore is 6.   Right-sided facial droop noted.  Weakness noted to the right arm and leg.   Skin: Skin is warm and dry.   Psychiatric: She has a normal mood and affect.       ED Course   Critical Care    Date/Time: 7/3/2023 7:52 PM  Performed by: Javier Ferreira MD  Authorized by: Javier Ferreira MD   Direct patient critical care time: 17 minutes  Additional history critical care time: 8 minutes  Ordering / reviewing critical care time: 11 minutes  Documentation critical care time: 10 minutes  Consulting other physicians critical care time: 10 minutes  Total critical care time (exclusive of procedural time) : 56 minutes  Critical care was time spent personally by me on the following activities: development of treatment plan with patient or surrogate, evaluation of patient's response to treatment, ordering and performing treatments and interventions, ordering and review of laboratory studies, ordering and review of radiographic studies, re-evaluation of patient's condition and obtaining history from patient or surrogate.      Labs Reviewed   COMPREHENSIVE METABOLIC PANEL - Abnormal; Notable for the following components:       Result Value    Chloride 114 (*)     CO2 18 (*)     Glucose 123 (*)     BUN 27 (*)     eGFR 42 (*)     All other components within normal limits    Narrative:     Collection has been rescheduled by [facility] at 07/03/2023 15:32 Reason:   Trying to get IV in pt  Collection has been rescheduled by ACD at 07/03/2023 15:52 Reason:   Patient unavailable   CBC W/ AUTO DIFFERENTIAL - Abnormal; Notable for the following components:    Hemoglobin 11.9 (*)     Hematocrit 34.4 (*)     MCV 81 (*)     RDW 14.9 (*)     All other components within normal limits    Narrative:     Collection has been rescheduled by [facility] at 07/03/2023 15:32 Reason:   Trying to get IV in pt  Collection has been rescheduled by ACD at 07/03/2023 15:52 Reason:   Patient unavailable   URINALYSIS, REFLEX TO URINE CULTURE -  Abnormal; Notable for the following components:    Protein, UA Trace (*)     All other components within normal limits    Narrative:     Specimen Source->Urine   POCT GLUCOSE - Abnormal; Notable for the following components:    POCT Glucose 131 (*)     All other components within normal limits   POCT GLUCOSE MONITORING CONTINUOUS          Imaging Results               MRI Brain Without Contrast (Final result)  Result time 07/03/23 19:45:10      Final result by Tariq Wallace DO (07/03/23 19:45:10)                   Impression:      1. Single small acute infarction in the right frontal white matter.  2. Numerous remote lacunar infarctions as above.  3. Advanced chronic microvascular ischemic changes.  4. Numerous parenchymal microhemorrhages as above which may be related to hypertensive encephalopathy or amyloidosis.  This report was flagged in Epic as abnormal.      Electronically signed by: Tariq Wallace  Date:    07/03/2023  Time:    19:45               Narrative:    EXAMINATION:  MRI BRAIN WITHOUT CONTRAST    CLINICAL HISTORY:  Stroke, follow up;    TECHNIQUE:  Multiplanar multisequence MR imaging of the brain was performed without intravenous contrast.    COMPARISON:  MRI brain from 05/02/2023.  CT head from 07/03/2023 taken at 15:52.    FINDINGS:  There is a single small focus of restricted diffusion in the right frontal lobe, just anterior to the frontal horn of the lateral ventricle.  No additional foci of restricted diffusion are seen.  There are multiple remote lacunar infarctions involving the regina, the bilateral basal ganglia and thalami, and the bilateral cerebellar hemispheres.  There is confluent T2/FLAIR hyperintense signal in the supratentorial white matter compatible with chronic microvascular ischemic changes.  There are numerous foci of susceptibility/parenchymal microhemorrhages in the bilateral cerebral hemispheres, the brainstem, and the right cerebellar hemisphere.  Ventricles are normal in  size for age without evidence of hydrocephalus. No mass, hemorrhage, or recent or remote major vascular distribution infarct.  No extra-axial blood or fluid collections. Normal vascular flow voids.    Bone marrow signal intensity is normal. Paranasal sinuses and mastoid air cells are clear.                                       CT Head Without Contrast (Final result)  Result time 07/03/23 16:33:18      Final result by Rebeca Schmidt MD (07/03/23 16:33:18)                   Impression:      No acute intracranial findings.  Mild involutional change and findings suggesting microvascular ischemic change and old lacunar infarcts.      Electronically signed by: Rebeca Schmidt MD  Date:    07/03/2023  Time:    16:33               Narrative:    EXAMINATION:  CT HEAD WITHOUT CONTRAST    CLINICAL HISTORY:  Neuro deficit, acute, stroke suspected;    TECHNIQUE:  Low dose axial images were obtained through the head.  Coronal and sagittal reformations were also performed. Contrast was not administered.    COMPARISON:  05/02/2023    FINDINGS:  there is mild dilatation of ventricles, sulci, fissures. There is decreased density in white matter suggesting microvascular ischemic change appearing advanced and with old lacunar infarcts in the basal ganglia bilaterally and left thalamus and the regina. No acute intracranial hemorrhage. No intracranial mass effect. No acute major vascular territory infarct. Note is made that MRI is typically more sensitive than CT particular for detection of early or small nonhemorrhagic infarct. The calvarium appears intact, mastoids well pneumatized, visualized paranasal sinuses essentially clear.                                       Medications   lactated ringers bolus 500 mL (0 mLs Intravenous Stopped 7/3/23 1812)   clopidogreL tablet 75 mg (75 mg Oral Given 7/3/23 2018)     Medical Decision Making:   Initial Assessment:   63-year-old female presented for evaluation of worsening weakness and  slurred speech.  Differential Diagnosis:   Initial differential diagnosis included but not limited to recurrent CVA, TIA, dehydration, and UTI.  Clinical Tests:   Lab Tests: Ordered and Reviewed  Radiological Study: Ordered and Reviewed  ED Management:  The patient was emergently evaluated in the emergency department, her evaluation was significant for an older female with slurred speech and right-sided weakness.  The patient's CT scan of her head showed no acute abnormalities per Radiology.  The patient's labs were significant for mild dehydration without infection noted in her urine.  She was treated with IV fluids here without improvement in her symptoms.  The case was discussed with the neurologist on-call, she recommended an MRI for further evaluation.  The patient's MRI does show a new acute infarct.  The case was again discussed with Neurology on-call, who recommended admission and to start the patient on p.o. Plavix and for better blood pressure control.  The case was discussed with the APC on call for the hospitalist service.  She has accepted the patient for admission.           ED Course as of 07/03/23 2119 Mon Jul 03, 2023 1808 The patient the patient's labs showed mild dehydration, and the patient was treated with IV fluids here in the emergency department.  The patient reports her speech is still off despite this.  The case was discussed with the neurologist on-call, Dr. Chino Santoro.  She is recommending an MRI of her brain right now. [PE]      ED Course User Index  [PE] Javier Ferreira MD                 Clinical Impression:   Final diagnoses:  [I63.9] Cerebrovascular accident (CVA), unspecified mechanism (Primary)        ED Disposition Condition    Observation Stable                Javier Ferreira MD  07/03/23 2119

## 2023-07-03 NOTE — LETTER
Patient: Steff Johnson  YOB: 1960  Date: 7/3/2023 Time: 8:35 PM  Location: [facility]    Leaving the Hospital Against Medical Advice    Chart #:[id number]    This will certify that I, the undersigned,    ______________________________________________________________________    A patient in the above named medical center, having requested discharge and removal from the medical center against the advice of my attending physician(s), hereby release [facility], its physicians, officers and employees, severally and individually, from any and all liability of any nature whatsoever for any injury or harm or complication of any kind that may result directly or indirectly, by reason of my terminating my stay as a patient at [facility] and my departure from [facility], and hereby waive any and all rights of action I may now have or later acquire as a result of my voluntary departure from [facility] and the termination of my stay as a patient therein.    This release is made with the full knowledge of the danger that may result from the action which I am taking.      Date:_______________________                         ___________________________                                                                                    Patient/Legal Representative    Witness:        ____________________________                          ___________________________  Nurse                                                                        Physician

## 2023-07-03 NOTE — PROGRESS NOTES
7/3/2023    Steff was schedule to receive speech therapy on 7/3/2023. However appointment cancelled.    Patient presenting with significant decline in physical functioning during occupational therapy session. Occupational and physical therapist concern with significant change. Patient was able to answer basic questions, however limited self-initiation of any speech.     Team determined patient should go to the hospital for scans due to concerns for decline in functioning.     Kamryn Werner, rehab supervisor, walked patient to the hospital.        Nancy Dodson M.S. CCC-SLP

## 2023-07-04 LAB
ALBUMIN SERPL BCP-MCNC: 3.2 G/DL (ref 3.5–5.2)
ALP SERPL-CCNC: 73 U/L (ref 55–135)
ALT SERPL W/O P-5'-P-CCNC: 26 U/L (ref 10–44)
ANION GAP SERPL CALC-SCNC: 8 MMOL/L (ref 8–16)
APTT PPP: 29.6 SEC (ref 21–32)
AST SERPL-CCNC: 17 U/L (ref 10–40)
BASOPHILS # BLD AUTO: 0.06 K/UL (ref 0–0.2)
BASOPHILS NFR BLD: 0.6 % (ref 0–1.9)
BILIRUB SERPL-MCNC: 0.4 MG/DL (ref 0.1–1)
BUN SERPL-MCNC: 24 MG/DL (ref 8–23)
CALCIUM SERPL-MCNC: 8.8 MG/DL (ref 8.7–10.5)
CHLORIDE SERPL-SCNC: 114 MMOL/L (ref 95–110)
CO2 SERPL-SCNC: 20 MMOL/L (ref 23–29)
CREAT SERPL-MCNC: 1.1 MG/DL (ref 0.5–1.4)
DIFFERENTIAL METHOD: ABNORMAL
EOSINOPHIL # BLD AUTO: 0.2 K/UL (ref 0–0.5)
EOSINOPHIL NFR BLD: 2.3 % (ref 0–8)
ERYTHROCYTE [DISTWIDTH] IN BLOOD BY AUTOMATED COUNT: 14.6 % (ref 11.5–14.5)
EST. GFR  (NO RACE VARIABLE): 56 ML/MIN/1.73 M^2
GLUCOSE SERPL-MCNC: 110 MG/DL (ref 70–110)
HCT VFR BLD AUTO: 29.6 % (ref 37–48.5)
HGB BLD-MCNC: 10.1 G/DL (ref 12–16)
IMM GRANULOCYTES # BLD AUTO: 0.03 K/UL (ref 0–0.04)
IMM GRANULOCYTES NFR BLD AUTO: 0.3 % (ref 0–0.5)
INR PPP: 1 (ref 0.8–1.2)
LYMPHOCYTES # BLD AUTO: 2.3 K/UL (ref 1–4.8)
LYMPHOCYTES NFR BLD: 23.8 % (ref 18–48)
MAGNESIUM SERPL-MCNC: 2 MG/DL (ref 1.6–2.6)
MCH RBC QN AUTO: 27.4 PG (ref 27–31)
MCHC RBC AUTO-ENTMCNC: 34.1 G/DL (ref 32–36)
MCV RBC AUTO: 80 FL (ref 82–98)
MONOCYTES # BLD AUTO: 0.9 K/UL (ref 0.3–1)
MONOCYTES NFR BLD: 9.5 % (ref 4–15)
NEUTROPHILS # BLD AUTO: 6.2 K/UL (ref 1.8–7.7)
NEUTROPHILS NFR BLD: 63.5 % (ref 38–73)
NRBC BLD-RTO: 0 /100 WBC
PHOSPHATE SERPL-MCNC: 3.4 MG/DL (ref 2.7–4.5)
PLATELET # BLD AUTO: 378 K/UL (ref 150–450)
PMV BLD AUTO: 10.5 FL (ref 9.2–12.9)
POCT GLUCOSE: 116 MG/DL (ref 70–110)
POCT GLUCOSE: 128 MG/DL (ref 70–110)
POCT GLUCOSE: 168 MG/DL (ref 70–110)
POTASSIUM SERPL-SCNC: 3.9 MMOL/L (ref 3.5–5.1)
PROT SERPL-MCNC: 6.8 G/DL (ref 6–8.4)
PROTHROMBIN TIME: 11.3 SEC (ref 9–12.5)
RBC # BLD AUTO: 3.69 M/UL (ref 4–5.4)
SODIUM SERPL-SCNC: 142 MMOL/L (ref 136–145)
WBC # BLD AUTO: 9.73 K/UL (ref 3.9–12.7)

## 2023-07-04 PROCEDURE — 94761 N-INVAS EAR/PLS OXIMETRY MLT: CPT

## 2023-07-04 PROCEDURE — 25000242 PHARM REV CODE 250 ALT 637 W/ HCPCS: Performed by: HOSPITALIST

## 2023-07-04 PROCEDURE — 99900035 HC TECH TIME PER 15 MIN (STAT)

## 2023-07-04 PROCEDURE — 97535 SELF CARE MNGMENT TRAINING: CPT

## 2023-07-04 PROCEDURE — 25000003 PHARM REV CODE 250: Performed by: NURSE PRACTITIONER

## 2023-07-04 PROCEDURE — 25500020 PHARM REV CODE 255

## 2023-07-04 PROCEDURE — 86147 CARDIOLIPIN ANTIBODY EA IG: CPT | Mod: 59 | Performed by: STUDENT IN AN ORGANIZED HEALTH CARE EDUCATION/TRAINING PROGRAM

## 2023-07-04 PROCEDURE — 85025 COMPLETE CBC W/AUTO DIFF WBC: CPT | Performed by: NURSE PRACTITIONER

## 2023-07-04 PROCEDURE — 97162 PT EVAL MOD COMPLEX 30 MIN: CPT

## 2023-07-04 PROCEDURE — 83090 ASSAY OF HOMOCYSTEINE: CPT | Performed by: STUDENT IN AN ORGANIZED HEALTH CARE EDUCATION/TRAINING PROGRAM

## 2023-07-04 PROCEDURE — 36415 COLL VENOUS BLD VENIPUNCTURE: CPT | Performed by: STUDENT IN AN ORGANIZED HEALTH CARE EDUCATION/TRAINING PROGRAM

## 2023-07-04 PROCEDURE — 85300 ANTITHROMBIN III ACTIVITY: CPT | Performed by: STUDENT IN AN ORGANIZED HEALTH CARE EDUCATION/TRAINING PROGRAM

## 2023-07-04 PROCEDURE — 81240 F2 GENE: CPT | Performed by: STUDENT IN AN ORGANIZED HEALTH CARE EDUCATION/TRAINING PROGRAM

## 2023-07-04 PROCEDURE — 97530 THERAPEUTIC ACTIVITIES: CPT

## 2023-07-04 PROCEDURE — 36415 COLL VENOUS BLD VENIPUNCTURE: CPT | Performed by: NURSE PRACTITIONER

## 2023-07-04 PROCEDURE — 83735 ASSAY OF MAGNESIUM: CPT | Performed by: NURSE PRACTITIONER

## 2023-07-04 PROCEDURE — 63600175 PHARM REV CODE 636 W HCPCS: Performed by: HOSPITALIST

## 2023-07-04 PROCEDURE — 94640 AIRWAY INHALATION TREATMENT: CPT

## 2023-07-04 PROCEDURE — 85730 THROMBOPLASTIN TIME PARTIAL: CPT | Mod: 91 | Performed by: STUDENT IN AN ORGANIZED HEALTH CARE EDUCATION/TRAINING PROGRAM

## 2023-07-04 PROCEDURE — 84100 ASSAY OF PHOSPHORUS: CPT | Performed by: NURSE PRACTITIONER

## 2023-07-04 PROCEDURE — 81241 F5 GENE: CPT | Performed by: STUDENT IN AN ORGANIZED HEALTH CARE EDUCATION/TRAINING PROGRAM

## 2023-07-04 PROCEDURE — 97166 OT EVAL MOD COMPLEX 45 MIN: CPT

## 2023-07-04 PROCEDURE — 85730 THROMBOPLASTIN TIME PARTIAL: CPT | Performed by: NURSE PRACTITIONER

## 2023-07-04 PROCEDURE — 80053 COMPREHEN METABOLIC PANEL: CPT | Performed by: NURSE PRACTITIONER

## 2023-07-04 PROCEDURE — 85610 PROTHROMBIN TIME: CPT | Performed by: NURSE PRACTITIONER

## 2023-07-04 PROCEDURE — 83695 ASSAY OF LIPOPROTEIN(A): CPT | Performed by: STUDENT IN AN ORGANIZED HEALTH CARE EDUCATION/TRAINING PROGRAM

## 2023-07-04 PROCEDURE — 25000003 PHARM REV CODE 250: Performed by: STUDENT IN AN ORGANIZED HEALTH CARE EDUCATION/TRAINING PROGRAM

## 2023-07-04 PROCEDURE — 11000001 HC ACUTE MED/SURG PRIVATE ROOM

## 2023-07-04 RX ORDER — CLOPIDOGREL BISULFATE 75 MG/1
75 TABLET ORAL DAILY
Status: DISCONTINUED | OUTPATIENT
Start: 2023-07-04 | End: 2023-07-06 | Stop reason: HOSPADM

## 2023-07-04 RX ORDER — IBUPROFEN 200 MG
16 TABLET ORAL
Status: DISCONTINUED | OUTPATIENT
Start: 2023-07-04 | End: 2023-07-06 | Stop reason: HOSPADM

## 2023-07-04 RX ORDER — INSULIN ASPART 100 [IU]/ML
1-10 INJECTION, SOLUTION INTRAVENOUS; SUBCUTANEOUS
Status: DISCONTINUED | OUTPATIENT
Start: 2023-07-04 | End: 2023-07-06 | Stop reason: HOSPADM

## 2023-07-04 RX ORDER — GLUCAGON 1 MG
1 KIT INJECTION
Status: DISCONTINUED | OUTPATIENT
Start: 2023-07-04 | End: 2023-07-06 | Stop reason: HOSPADM

## 2023-07-04 RX ORDER — IBUPROFEN 200 MG
24 TABLET ORAL
Status: DISCONTINUED | OUTPATIENT
Start: 2023-07-04 | End: 2023-07-06 | Stop reason: HOSPADM

## 2023-07-04 RX ADMIN — IOHEXOL 75 ML: 350 INJECTION, SOLUTION INTRAVENOUS at 09:07

## 2023-07-04 RX ADMIN — HYDRALAZINE HYDROCHLORIDE 100 MG: 25 TABLET, FILM COATED ORAL at 10:07

## 2023-07-04 RX ADMIN — LATANOPROST 1 DROP: 50 SOLUTION OPHTHALMIC at 10:07

## 2023-07-04 RX ADMIN — CLONIDINE HYDROCHLORIDE 0.1 MG: 0.1 TABLET ORAL at 02:07

## 2023-07-04 RX ADMIN — HYDRALAZINE HYDROCHLORIDE 100 MG: 25 TABLET, FILM COATED ORAL at 09:07

## 2023-07-04 RX ADMIN — CLONIDINE HYDROCHLORIDE 0.1 MG: 0.1 TABLET ORAL at 12:07

## 2023-07-04 RX ADMIN — HYDRALAZINE HYDROCHLORIDE 100 MG: 25 TABLET, FILM COATED ORAL at 12:07

## 2023-07-04 RX ADMIN — METOPROLOL SUCCINATE 100 MG: 50 TABLET, EXTENDED RELEASE ORAL at 10:07

## 2023-07-04 RX ADMIN — TIOTROPIUM BROMIDE INHALATION SPRAY 2 PUFF: 3.12 SPRAY, METERED RESPIRATORY (INHALATION) at 07:07

## 2023-07-04 RX ADMIN — LOSARTAN POTASSIUM 100 MG: 100 TABLET, FILM COATED ORAL at 10:07

## 2023-07-04 RX ADMIN — ASPIRIN 81 MG CHEWABLE TABLET 81 MG: 81 TABLET CHEWABLE at 10:07

## 2023-07-04 RX ADMIN — ATORVASTATIN CALCIUM 80 MG: 40 TABLET, FILM COATED ORAL at 09:07

## 2023-07-04 RX ADMIN — AMLODIPINE BESYLATE 10 MG: 5 TABLET ORAL at 10:07

## 2023-07-04 RX ADMIN — DOCUSATE SODIUM AND SENNOSIDES 1 TABLET: 8.6; 5 TABLET, FILM COATED ORAL at 09:07

## 2023-07-04 RX ADMIN — HYDRALAZINE HYDROCHLORIDE 100 MG: 25 TABLET, FILM COATED ORAL at 02:07

## 2023-07-04 RX ADMIN — DOCUSATE SODIUM AND SENNOSIDES 1 TABLET: 8.6; 5 TABLET, FILM COATED ORAL at 10:07

## 2023-07-04 RX ADMIN — CLOPIDOGREL BISULFATE 75 MG: 75 TABLET, FILM COATED ORAL at 10:07

## 2023-07-04 RX ADMIN — CLONIDINE HYDROCHLORIDE 0.1 MG: 0.1 TABLET ORAL at 10:07

## 2023-07-04 RX ADMIN — CLONIDINE HYDROCHLORIDE 0.1 MG: 0.1 TABLET ORAL at 09:07

## 2023-07-04 RX ADMIN — INSULIN ASPART 2 UNITS: 100 INJECTION, SOLUTION INTRAVENOUS; SUBCUTANEOUS at 05:07

## 2023-07-04 NOTE — PLAN OF CARE
Problem: Adult Inpatient Plan of Care  Goal: Plan of Care Review  Outcome: Ongoing, Progressing  Goal: Patient-Specific Goal (Individualized)  Outcome: Ongoing, Progressing     Problem: Adjustment to Illness (Stroke, Ischemic/Transient Ischemic Attack)  Goal: Optimal Coping  Outcome: Ongoing, Progressing     Problem: Cerebral Tissue Perfusion (Stroke, Ischemic/Transient Ischemic Attack)  Goal: Optimal Cerebral Tissue Perfusion  Outcome: Ongoing, Progressing     Problem: Communication Impairment (Stroke, Ischemic/Transient Ischemic Attack)  Goal: Improved Communication Skills  Outcome: Ongoing, Progressing

## 2023-07-04 NOTE — SUBJECTIVE & OBJECTIVE
Interval History:  Patient seen and examined.  No acute events.  Remains aphasic.  Plan of care reviewed with the patient and the nurse at the bedside in detail.      Objective:     Vital Signs (Most Recent):  Temp: 97.6 °F (36.4 °C) (07/04/23 1340)  Pulse: (!) 51 (07/04/23 1340)  Resp: 20 (07/04/23 1340)  BP: (!) 149/67 (07/04/23 1340)  SpO2: 99 % (07/04/23 1340) Vital Signs (24h Range):  Temp:  [97.6 °F (36.4 °C)-98.4 °F (36.9 °C)] 97.6 °F (36.4 °C)  Pulse:  [51-72] 51  Resp:  [16-20] 20  SpO2:  [96 %-100 %] 99 %  BP: (149-232)/() 149/67     Weight: 59 kg (130 lb)  Body mass index is 26.26 kg/m².    Intake/Output Summary (Last 24 hours) at 7/4/2023 1520  Last data filed at 7/3/2023 1812  Gross per 24 hour   Intake 500 ml   Output --   Net 500 ml         Physical Exam  Vitals and nursing note reviewed.   Constitutional:       General: She is not in acute distress.  Eyes:      Pupils: Pupils are equal, round, and reactive to light.   Cardiovascular:      Rate and Rhythm: Normal rate and regular rhythm.      Heart sounds: No murmur heard.  Pulmonary:      Effort: Pulmonary effort is normal.      Breath sounds: Normal breath sounds.   Abdominal:      General: There is no distension.      Palpations: Abdomen is soft.      Tenderness: There is no abdominal tenderness.   Skin:     Coloration: Skin is not pale.      Findings: No rash.   Neurological:      Mental Status: She is alert and oriented to person, place, and time.      Motor: Weakness present.      Coordination: Coordination abnormal.      Gait: Gait abnormal.      Deep Tendon Reflexes: Reflexes abnormal.      Comments: Aphasia noted           Significant Labs: All pertinent labs within the past 24 hours have been reviewed.    Significant Imaging: I have reviewed all pertinent imaging results/findings within the past 24 hours.

## 2023-07-04 NOTE — PLAN OF CARE
Goals to be met by: 7/25/2023     Patient will increase functional independence with ADLs by performing:    Feeding with Stand-by Assistance.  UE Dressing with Minimal Assistance.  LE Dressing with Moderate Assistance.  Grooming while seated in chair/EOB with Contact Guard Assistance.  Toileting from bedside commode with Moderate Assistance for hygiene and clothing management.   Toilet transfer to bedside commode with Minimal Assistance.    OT POC initiated and established.

## 2023-07-04 NOTE — PLAN OF CARE
Problem: Physical Therapy  Goal: Physical Therapy Goal  Description: Goals to be met by: 23     Patient will increase functional independence with mobility by performin. Supine to sit with Contact Guard Assistance  2. Sit to stand transfer with Contact Guard Assistance  3. Bed to chair transfer with Contact Guard Assistance using Rolling Walker  4. Gait  x 150 feet with Contact Guard Assistance using Rolling Walker.   5. Increased functional strength to 4/5 for use in mobility and amb.    Outcome: Ongoing, Progressing  PT for functional mob training and/ or ex; pt educated on safety precautions

## 2023-07-04 NOTE — PLAN OF CARE
Problem: Adult Inpatient Plan of Care  Goal: Patient-Specific Goal (Individualized)  Outcome: Ongoing, Progressing  Flowsheets (Taken 7/4/2023 1821)  Anxieties, Fears or Concerns: I'm alright  Individualized Care Needs: Safety, Contracture prevention, Neuro Checks  Goal: Absence of Hospital-Acquired Illness or Injury  Outcome: Ongoing, Progressing  Goal: Optimal Comfort and Wellbeing  Outcome: Ongoing, Progressing  Goal: Readiness for Transition of Care  Outcome: Ongoing, Progressing     Problem: Adjustment to Illness (Stroke, Ischemic/Transient Ischemic Attack)  Goal: Optimal Coping  Outcome: Ongoing, Progressing     Problem: Bowel Elimination Impaired (Stroke, Ischemic/Transient Ischemic Attack)  Goal: Effective Bowel Elimination  Outcome: Ongoing, Progressing     Problem: Cerebral Tissue Perfusion (Stroke, Ischemic/Transient Ischemic Attack)  Goal: Optimal Cerebral Tissue Perfusion  Outcome: Ongoing, Progressing     Problem: Cognitive Impairment (Stroke, Ischemic/Transient Ischemic Attack)  Goal: Optimal Cognitive Function  Outcome: Ongoing, Progressing     Problem: Communication Impairment (Stroke, Ischemic/Transient Ischemic Attack)  Goal: Improved Communication Skills  Outcome: Ongoing, Progressing     Problem: Functional Ability Impaired (Stroke, Ischemic/Transient Ischemic Attack)  Goal: Optimal Functional Ability  Outcome: Ongoing, Progressing     Problem: Respiratory Compromise (Stroke, Ischemic/Transient Ischemic Attack)  Goal: Effective Oxygenation and Ventilation  Outcome: Ongoing, Progressing

## 2023-07-04 NOTE — ASSESSMENT & PLAN NOTE
Patient has a current diagnosis of Hypertensive emergency with end organ damage evidenced by acute ischemic stroke which is controlled.  Latest blood pressure and vitals reviewed-   Temp:  [97.9 °F (36.6 °C)-98.1 °F (36.7 °C)]   Pulse:  [61-80]   Resp:  [18]   BP: (165-232)/()   SpO2:  [97 %-100 %] .   Patient currently on IV antihypertensives.   Home meds for hypertension were reviewed and noted below.   Hypertension Medications             amLODIPine (NORVASC) 10 MG tablet Take 1 tablet (10 mg total) by mouth once daily.    6/6/23 2111 - Original Entry by Cristina Ren MD  Authorizing Provider: Cristina Ren MD             Name:  amLODIPine (NORVASC) 10 MG tablet Start date:  6/6/23 End date:  6/5/24 2359    Medication:  AMLODIPINE 10 MG ORAL TAB [[id number]]    Dose:  10 mg Route:  Oral Frequency:  Daily    Sig: Take 1 tablet (10 mg total) by mouth once daily.    Instructions:  --           Changes to the order are displayed in red.  Note that there may be changes made to the order that are not shown in this report, such as changes to details about ingredients.     cloNIDine (CATAPRES) 0.1 MG tablet Take 0.1 mg by mouth once daily.    hydrALAZINE (APRESOLINE) 100 MG tablet Take 1 tablet (100 mg total) by mouth 3 (three) times daily.    2/16/23 1537 - Original Entry by Cristina Ren MD  Authorizing Provider: Cristina Ren MD             Name:  hydrALAZINE (APRESOLINE) 100 MG tablet Start date:  2/16/23 End date:  2/16/24 2359    Medication:  HYDRALAZINE 100 MG ORAL TAB [[id number]]    Dose:  100 mg Route:  Oral Frequency:  3 times daily    Sig: Take 1 tablet (100 mg total) by mouth 3 (three) times daily.    Instructions:  --           Changes to the order are displayed in red.  Note that there may be changes made to the order that are not shown in this report, such as changes to details about ingredients.     losartan (COZAAR) 100 MG tablet Take 1 tablet (100 mg total) by mouth once daily.    6/6/23 2111 -  Original Entry by Cristina Ren MD  Authorizing Provider: Cristina Ren MD             Name:  losartan (COZAAR) 100 MG tablet Start date:  6/6/23 End date:  6/5/24 2359    Medication:  LOSARTAN 100 MG ORAL TAB [[id number]]    Dose:  100 mg Route:  Oral Frequency:  Daily    Sig: Take 1 tablet (100 mg total) by mouth once daily.    Instructions:  --           Changes to the order are displayed in red.  Note that there may be changes made to the order that are not shown in this report, such as changes to details about ingredients.     metoprolol succinate (TOPROL-XL) 100 MG 24 hr tablet Take 1 tablet (100 mg total) by mouth once daily.    6/15/23 1857 - Modified by Yana Allison  Authorizing Provider: Cristina Ren MD             Name:  metoprolol succinate (TOPROL-XL) 100 MG 24 hr tablet Start date:  2/16/23 End date:  6/15/23 2359    Medication:  METOPROLOL SUCCINATE 100 MG ORAL TB24 [[id number]]    Dose:  100 mg Route:  Oral Frequency:  Daily    Sig: Take 1 tablet (100 mg total) by mouth once daily.    Instructions:  --           2/16/23 1537 - Original Entry by Cristina Ren MD  Authorizing Provider: Cristina Ren MD             Name:  metoprolol succinate (TOPROL-XL) 100 MG 24 hr tablet Start date:  2/16/23 End date:  5/17/23 2359    Medication:  METOPROLOL SUCCINATE 100 MG ORAL TB24 [[id number]]    Dose:  100 mg Route:  Oral Frequency:  Daily    Sig: Take 1 tablet (100 mg total) by mouth once daily.    Instructions:  --           Changes to the order are displayed in red.  Note that there may be changes made to the order that are not shown in this report, such as changes to details about ingredients.               Medication adjustment for hospital antihypertensives is as follows- IV labetolol PRN increased clonidine to TID, consider increasing strength. cardene gtt if resistant     Will aim for controlled BP reduction by medications noted above. Monitor and mitigate end organ damage as indicated.

## 2023-07-04 NOTE — PT/OT/SLP EVAL
Occupational Therapy Evaluation and Treatment    Name: Steff Johnson  MRN: [id number]  Admitting Diagnosis: CVA (cerebral vascular accident)  Recent Surgery: * No surgery found *      Recommendations:     Discharge Recommendations: rehabilitation facility, outpatient OT, other (see comments) (IRF versus resumption of outpatient OT)  Level of Assistance Recommended: 24 hours significant assistance and 24 hours supervision  Discharge Equipment Recommendations: none  Barriers to discharge: None    Assessment:     Steff Johnson is a 63 y.o. female with a medical diagnosis of CVA (cerebral vascular accident). Past medical history of CVA with right UE hemiparesis and expressive aphasia, hypertension, HLD, IDDM2. Per chart review, patient attending outpatient therapy (for residual R hemiparesis due to stroke in May) when therapist noting significantly increased weakness compared to previous session on 6/27/2023; therapist going immediately to ED.      Patient with residual aphasia from previous CVA in May 2023. Patient able to answer simple Y/N questions and one-word answers for open ended question(s). Patient agreeable to participate in OT evaluation/treatment session. Patient agreeable to mobilize to EOB. Mod/Max (A) for supine<>sit with step by step verbal instruction/[place] as needed, tolerated sitting EOB > 25 minutes with CGA to maintain midline with single episode of posterior loss of balance to perform feeding using non-dominant L UE with overall CGA/close SBA and Set-up of items. She presents with performance deficits affecting function including weakness, impaired endurance, impaired self care skills, impaired functional mobility, gait instability, impaired balance, impaired cognition, decreased upper extremity function, decreased lower extremity function, decreased safety awareness, abnormal tone, decreased ROM, impaired coordination, impaired fine motor. R UE grossly 2-/5 strength; hypertonic and flexor  synergistic pattern; OTR instructing on importance of correct positioning at rest for flexor tone inhibition - patient verbalizes/demonstrates understanding and in agreement. Patient participating in AAROM therapeutic exercises and weight bearing through open palm.    Recommend IRF versus home with 24/7 Supervision/assistance and resumption of outpatient OT services.    Rehab Prognosis: Good; patient would benefit from acute OT services to address these deficits and reach maximum level of function.    Plan:     Patient to be seen 5 x/week to address the above listed problems via self-care/home management, therapeutic activities, therapeutic exercises, neuromuscular re-education  Plan of Care Expires: 07/25/23  Plan of Care Reviewed with: patient    Subjective     Chief Complaint: None  Patient Comments/Goals: None stated   Pain/Comfort:  Pain Rating 1: 0/10    Patients cultural, spiritual, Holiness conflicts given the current situation: no    Social History:  Living Environment: Patient  lives with spouse and brother    Prior Level of Function: Prior to admission, patient  requires (A) to bathe/dress and toilet; wheelchair-level mobility only; receiving outpatient OT services  Roles and Routines: Patient was  not driving/not working  prior to admission.  Equipment Used at Home: bedside commode, cane, straight, rollator, shower chair, walker, rolling, wheelchair  DME owned (not currently used): straight cane, rolling walker, and rollator; patient verbalizes baseline use of wheelchair and bedside commode (since stroke in May 2023)  Assistance Upon Discharge:  Spouse versus facility staff    Objective:     Communicated with Nurse, Sara, prior to session. Patient found HOB elevated with telemetry, peripheral IV (SCD pumps donned but not conned to machine) upon OT entry to room.    General Precautions: Standard, aphasia, aspiration, fall   Orthopedic Precautions: N/A   Braces: N/A    Respiratory Status: Room  air    Occupational Performance    Bed Mobility:   Rolling/Turning to Left with maximal assistance  Rolling/Turning to Right with moderate assistance  Scooting to HOB in supine: (A) of 2 persons using draw-sheet  Supine to sit from right side of bed with maximal assistance with step by step verbal instruction and [place] as needed for technique  Sit to Supine with moderate assistance on right side of bed    Functional Mobility/Transfers:  Functional Mobility: From EOB, anterior/posterior scooting with Max (A) and verbal/tactile cues as needed for facilitation of R/L pelvic tilting     Activities of Daily Living:  Feeding: From EOB, patient performing feeding tasks with Set-up of items and close Stand by assistance; CGA to maintain midline while seated EOB with single episode of posterior near loss of balance  Grooming: stand by assistance  Upper Body Dressing: maximal assistance  Lower Body Dressing: maximal assistance and total assistance  Toileting: maximal assistance    Cognitive/Visual Perceptual:  Cognitive/Psychosocial Skills:    -     Oriented to: Person and Situation  -     Follows Commands/attention: Follows one-step commands approximately 50-75% of the time due to possible apraxia  -     Communication: Residual expressive aphasia post CVA in May 2023  -     Safety awareness/insight to disability: impaired  -     Mood/Affect/Coping skills/emotional control: Appropriate to situation and Cooperative  Visual/Perceptual:    -     Mild R limb inattention     Physical Exam:  Balance:    -     Sitting: assistance ranging from contact guard assistance to moderate assistance while seated EOB to perform ADL with single episode of posterior loss of balance requiring Mod (A) to return to midline  Postural examination/scapula alignment:    -       Rounded shoulders  -       Posterior pelvic tilt  Motor Planning: Impaired: R UE   Dominant hand: Right  Upper Extremity Range of Motion:     -       Right Upper Extremity: PROM  "WFL; hypertonic; flexor synergist pattern; patient demonstrates shoulder shrug and minimal AAROM elbow/wrist/digit extension to return extremity to correct resting position in bed  -       Left Upper Extremity: WFL  Upper Extremity Strength:    -       Right Upper Extremity: 1+/5 to 2/5   -       Left Upper Extremity: WFL   Strength:    -       Right Upper Extremity: 1+/5  -       Left Upper Extremity: WFL    AMPAC 6 Click ADL:  AMPA Total Score: 14    Treatment & Education:  Patient educated on role of OT, POC, and goals for therapy  Patient educated on importance of OOB activities with staff member assistance and sitting OOB majority of the day; reinforcing use of call button and use of bed alarm - patient verbalizing understanding via "yes"  OTR providing education/instruction regarding importance of R UE attention and positioning at rest for flexor tone inhibition - shoulder in 45* or more abduction, elbow in neutral, wrist/digits in extension - OTR positioning at end of session and educating nurse/PCT to reinforce positioning - both nurseSara, and PCTEmily, in agreement  R UE PROM and gentle stretching; instructed on and performing 2 sets x 5 of shoulder shrug, and weight bearing through extended wrist/digits with elbow in extension x 5 with 2 second hold end range; patient also demonstrates ability to correctly re-position R UE independently post flexor tone - OTR reinforcing patient to attend to R UE positioning frequently - patient verbalizes understanding and in agreement  OTR initiating discharge planning     Patient left HOB elevated with all lines intact, call button in reach, bed alarm on, and NurseSara, present; purewick connected and SCD connected    GOALS:   Multidisciplinary Problems       Occupational Therapy Goals          Problem: Occupational Therapy    Goal Priority Disciplines Outcome Interventions   Occupational Therapy Goal     OT, PT/OT     Description: Goals to be met by: " 7/25/2023     Patient will increase functional independence with ADLs by performing:    Feeding with Stand-by Assistance.  UE Dressing with Minimal Assistance.  LE Dressing with Moderate Assistance.  Grooming while seated in chair/EOB with Contact Guard Assistance.  Toileting from bedside commode with Moderate Assistance for hygiene and clothing management.   Toilet transfer to bedside commode with Minimal Assistance.                         History:     Past Medical History:   Diagnosis Date    Arthritis     Complete tear of left rotator cuff 11/09/2017    COPD (chronic obstructive pulmonary disease)     COVID-19 infection 07/02/2021    Diabetes mellitus     H/o Cerebrovascular accident (CVA) of left pontine structure 12/12/2019    Hypertension     Personal history of colonic polyps     Stroke     Wears glasses          Past Surgical History:   Procedure Laterality Date    COLONOSCOPY N/A 4/11/2017    Procedure: COLONOSCOPY;  Surgeon: Jared Antonio MD;  Location: [facility];  Service: Endoscopy;  Laterality: N/A;    HYSTERECTOMY      OOPHORECTOMY      SHOULDER SURGERY      R    TRANSESOPHAGEAL ECHOCARDIOGRAM WITH POSSIBLE CARDIOVERSION (GT W/ POSS CARDIOVERSION) N/A 5/4/2023    Procedure: Transesophageal echo (GT) intra-procedure log documentation;  Surgeon: Blade Phillip MD;  Location: [facility] CATH/EP LAB;  Service: Cardiology;  Laterality: N/A;       Time Tracking:     OT Date of Treatment: 07/04/23  OT Start Time: 1050  OT Stop Time: 1138  OT Total Time (min): 48 min    Billable Minutes: Evaluation 10, Self Care/Home Management 28, and Therapeutic Exercise 10    7/4/2023

## 2023-07-04 NOTE — ASSESSMENT & PLAN NOTE
Patient is chronically on statin.will continue for now. Monitor clinically. Last LDL was   Lab Results   Component Value Date    LDLCALC 65.4 05/02/2023

## 2023-07-04 NOTE — ASSESSMENT & PLAN NOTE
Admit to PCU  Consult neurology, given microhemorrhages, recommend SBP below 180, prn labetolol ordered, increased clonidine to TID    Antithrombotics for secondary stroke prevention: Antiplatelets: Aspirin: 81 mg daily  Clopidogrel: 75 mg daily    Statins for secondary stroke prevention and hyperlipidemia, if present:   Statins: Atorvastatin- 80 mg daily    Aggressive risk factor modification: HTN, DM, HLD     Rehab efforts: The patient has been evaluated by a stroke team provider and the therapy needs have been fully considered based off the presenting complaints and exam findings. The following therapy evaluations are needed: PT evaluate and treat, OT evaluate and treat    Diagnostics ordered/pending: completed    VTE prophylaxis: None: Reason for No Pharmacological VTE Prophylaxis: microhemorrhages    BP parameters: Infarct: keep SBP <180

## 2023-07-04 NOTE — HPI
"Ms. 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 It is moderate. It is associated with slurred speech and increased fatigue. She denies CP or SOB. She is a poor historian and  is at bedside who supplements and denies facial asymmetry. He states symptoms began 2-3 days ago. In the ED, CT head was negative for acute abnormality, but MRI revealed: "1. Single small acute infarction in the right frontal white matter. 2. Numerous remote lacunar infarctions as above. 3. Advanced chronic microvascular ischemic changes. 4. Numerous parenchymal microhemorrhages as above which may be related to hypertensive encephalopathy or amyloidosis." This was personally discussed with Dr. Chino Melvin who recommends SBP goal of 180 and DAPT and admission. Hospital medicine is consulted for admission.   "

## 2023-07-04 NOTE — ASSESSMENT & PLAN NOTE
Patient has a current diagnosis of Hypertensive emergency with end organ damage evidenced by acute ischemic stroke which is controlled.  Latest blood pressure and vitals reviewed-   Temp:  [97.6 °F (36.4 °C)-98.4 °F (36.9 °C)]   Pulse:  [51-72]   Resp:  [16-20]   BP: (149-232)/()   SpO2:  [96 %-100 %] .   Patient currently on IV antihypertensives.   Home meds for hypertension were reviewed.    Medication adjustment for hospital antihypertensives is as follows- IV labetolol PRN increased clonidine to TID, consider increasing strength. cardene gtt if resistant     Will aim for controlled BP reduction by medications noted above. Monitor and mitigate end organ damage as indicated.

## 2023-07-04 NOTE — ASSESSMENT & PLAN NOTE
Patient's FSGs are controlled on current medication regimen.  Last A1c reviewed-   Lab Results   Component Value Date    HGBA1C 10.2 (H) 05/03/2023     Most recent fingerstick glucose reviewed-   Recent Labs   Lab 07/03/23  1533   POCTGLUCOSE 131*     Current correctional scale  Medium  Maintain anti-hyperglycemic dose as follows-   Antihyperglycemics (From admission, onward)    Start     Stop Route Frequency Ordered    07/04/23 2100  insulin detemir U-100 (Levemir) pen 40 Units         -- SubQ Nightly 07/03/23 2321        Hold Oral hypoglycemics while patient is in the hospital.

## 2023-07-04 NOTE — H&P
"Critical access hospital Medicine  History & Physical    Patient Name: Steff Johnson  MRN: [id number]  Patient Class: IP- Inpatient  Admission Date: 7/3/2023  Attending Physician: Dr. Riggs  Primary Care Provider: Cristina Ren MD         Patient information was obtained from patient, spouse/SO, past medical records and ER records.     Subjective:     Principal Problem:CVA (cerebral vascular accident)    Chief Complaint:   Chief Complaint   Patient presents with    Weakness     Multiple cva's / slurred speech and weakness started over weekend         HPI: Ms. 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 It is moderate. It is associated with slurred speech and increased fatigue. She denies CP or SOB. She is a poor historian and  is at bedside who supplements and denies facial asymmetry. He states symptoms began 2-3 days ago. In the ED, CT head was negative for acute abnormality, but MRI revealed: "1. Single small acute infarction in the right frontal white matter. 2. Numerous remote lacunar infarctions as above. 3. Advanced chronic microvascular ischemic changes. 4. Numerous parenchymal microhemorrhages as above which may be related to hypertensive encephalopathy or amyloidosis." This was personally discussed with Dr. Chino Melvin who recommends SBP goal of 180 and DAPT and admission. Hospital medicine is consulted for admission.       Past Medical History:   Diagnosis Date    Arthritis     Complete tear of left rotator cuff 11/09/2017    COPD (chronic obstructive pulmonary disease)     COVID-19 infection 07/02/2021    Diabetes mellitus     H/o Cerebrovascular accident (CVA) of left pontine structure 12/12/2019    Hypertension     Personal history of colonic polyps     Stroke     Wears glasses        Past Surgical History:   Procedure Laterality Date " "   COLONOSCOPY N/A 4/11/2017    Procedure: COLONOSCOPY;  Surgeon: Jared Antonio MD;  Location: [facility];  Service: Endoscopy;  Laterality: N/A;    HYSTERECTOMY      OOPHORECTOMY      SHOULDER SURGERY      R    TRANSESOPHAGEAL ECHOCARDIOGRAM WITH POSSIBLE CARDIOVERSION (GT W/ POSS CARDIOVERSION) N/A 5/4/2023    Procedure: Transesophageal echo (GT) intra-procedure log documentation;  Surgeon: Blade Phillip MD;  Location: [facility] CATH/EP LAB;  Service: Cardiology;  Laterality: N/A;       Review of patient's allergies indicates:  No Known Allergies    No current facility-administered medications on file prior to encounter.     Current Outpatient Medications on File Prior to Encounter   Medication Sig    amLODIPine (NORVASC) 10 MG tablet Take 1 tablet (10 mg total) by mouth once daily.    aspirin 81 MG Chew Take 1 tablet (81 mg total) by mouth once daily.    atorvastatin (LIPITOR) 80 MG tablet Take 1 tablet (80 mg total) by mouth every evening.    cloNIDine (CATAPRES) 0.1 MG tablet Take 0.1 mg by mouth once daily.    dexAMETHasone (OZURDEX) 0.7 mg Impl intravitreal implant 0.7 mg by Intravitreal route once.    hydrALAZINE (APRESOLINE) 100 MG tablet Take 1 tablet (100 mg total) by mouth 3 (three) times daily.    insulin aspart U-100 (NOVOLOG) 100 unit/mL injection Inject 12 Units into the skin 3 (three) times daily before meals.    insulin degludec (TRESIBA FLEXTOUCH U-100) 100 unit/mL (3 mL) insulin pen ADMINISTER 50 UNITS UNDER THE SKIN EVERY EVENING    latanoprost 0.005 % ophthalmic solution Place 1 drop into both eyes once daily.    losartan (COZAAR) 100 MG tablet Take 1 tablet (100 mg total) by mouth once daily.    metoprolol succinate (TOPROL-XL) 100 MG 24 hr tablet Take 1 tablet (100 mg total) by mouth once daily.    pen needle, diabetic (BD ULTRA-FINE SHORT PEN NEEDLE) 31 gauge x 5/16" Ndle 1 pen by Misc.(Non-Drug; Combo Route) route 4 (four) times daily.    tiotropium (SPIRIVA) 18 mcg " inhalation capsule Inhale 1 capsule (18 mcg total) into the lungs once daily. Controller    [DISCONTINUED] blood-glucose meter (TRUE METRIX GLUCOSE METER) kit Use as instructed    [DISCONTINUED] lancing device (TRUEDRAW LANCING DEVICE) Misc Inject 1 Device into the skin 2 (two) times daily.     Family History       Problem Relation (Age of Onset)    Anemia Daughter    Asthma Mother    Diabetes Mother, Father, Brother    Hypertension Mother, Brother          Tobacco Use    Smoking status: Never    Smokeless tobacco: Never   Substance and Sexual Activity    Alcohol use: No    Drug use: No    Sexual activity: Not Currently     Review of Systems   Unable to perform ROS: Dementia   Respiratory:  Negative for shortness of breath.    Cardiovascular:  Negative for chest pain.   Neurological:  Positive for speech difficulty and weakness.   Objective:     Vital Signs (Most Recent):  Temp: 98.1 °F (36.7 °C) (07/03/23 1456)  Pulse: 66 (07/04/23 0051)  Resp: 18 (07/03/23 1456)  BP: (!) 176/79 (07/04/23 0051)  SpO2: 98 % (07/04/23 0051) Vital Signs (24h Range):  Temp:  [98.1 °F (36.7 °C)] 98.1 °F (36.7 °C)  Pulse:  [61-80] 66  Resp:  [18] 18  SpO2:  [97 %-100 %] 98 %  BP: (165-232)/() 176/79     Weight: 59 kg (130 lb)  Body mass index is 26.26 kg/m².     Physical Exam  Vitals and nursing note reviewed.   Constitutional:       Appearance: Normal appearance.   HENT:      Head: Normocephalic and atraumatic.      Nose: Nose normal.      Mouth/Throat:      Mouth: Mucous membranes are moist.      Pharynx: Oropharynx is clear.   Eyes:      Extraocular Movements: Extraocular movements intact.      Pupils: Pupils are equal, round, and reactive to light.   Cardiovascular:      Rate and Rhythm: Normal rate and regular rhythm.      Pulses: Normal pulses.      Heart sounds: Normal heart sounds.   Pulmonary:      Effort: Pulmonary effort is normal.      Breath sounds: Normal breath sounds.   Abdominal:      General: Bowel sounds  are normal.      Palpations: Abdomen is soft.   Musculoskeletal:         General: Normal range of motion.      Cervical back: Normal range of motion and neck supple.   Skin:     General: Skin is warm and dry.      Capillary Refill: Capillary refill takes less than 2 seconds.   Neurological:      Mental Status: She is alert.      Comments: Does not answer many questions. Right arm is contracted, no grasp, RLE 3/5 strength. RLE and RUE 5/5   Psychiatric:         Behavior: Behavior normal.            CRANIAL NERVES     CN III, IV, VI   Pupils are equal, round, and reactive to light.     Significant Labs: All pertinent labs within the past 24 hours have been reviewed.  CBC:   Recent Labs   Lab 07/03/23  1611   WBC 9.46   HGB 11.9*   HCT 34.4*        CMP:   Recent Labs   Lab 07/03/23  1611      K 4.8   *   CO2 18*   *   BUN 27*   CREATININE 1.4   CALCIUM 9.5   PROT 8.3   ALBUMIN 3.9   BILITOT 0.4   ALKPHOS 79   AST 25   ALT 34   ANIONGAP 12       Significant Imaging: I have reviewed all pertinent imaging results/findings within the past 24 hours.    CT Head Without Contrast    Result Date: 7/3/2023  EXAMINATION: CT HEAD WITHOUT CONTRAST CLINICAL HISTORY: Neuro deficit, acute, stroke suspected; TECHNIQUE: Low dose axial images were obtained through the head.  Coronal and sagittal reformations were also performed. Contrast was not administered. COMPARISON: 05/02/2023 FINDINGS: there is mild dilatation of ventricles, sulci, fissures. There is decreased density in white matter suggesting microvascular ischemic change appearing advanced and with old lacunar infarcts in the basal ganglia bilaterally and left thalamus and the regina. No acute intracranial hemorrhage. No intracranial mass effect. No acute major vascular territory infarct. Note is made that MRI is typically more sensitive than CT particular for detection of early or small nonhemorrhagic infarct. The calvarium appears intact, mastoids well  pneumatized, visualized paranasal sinuses essentially clear.     No acute intracranial findings.  Mild involutional change and findings suggesting microvascular ischemic change and old lacunar infarcts. Electronically signed by: Rebeca Schmidt MD Date:    07/03/2023 Time:    16:33    MRI Brain Without Contrast    Result Date: 7/3/2023  EXAMINATION: MRI BRAIN WITHOUT CONTRAST CLINICAL HISTORY: Stroke, follow up; TECHNIQUE: Multiplanar multisequence MR imaging of the brain was performed without intravenous contrast. COMPARISON: MRI brain from 05/02/2023.  CT head from 07/03/2023 taken at 15:52. FINDINGS: There is a single small focus of restricted diffusion in the right frontal lobe, just anterior to the frontal horn of the lateral ventricle.  No additional foci of restricted diffusion are seen.  There are multiple remote lacunar infarctions involving the regina, the bilateral basal ganglia and thalami, and the bilateral cerebellar hemispheres.  There is confluent T2/FLAIR hyperintense signal in the supratentorial white matter compatible with chronic microvascular ischemic changes.  There are numerous foci of susceptibility/parenchymal microhemorrhages in the bilateral cerebral hemispheres, the brainstem, and the right cerebellar hemisphere.  Ventricles are normal in size for age without evidence of hydrocephalus. No mass, hemorrhage, or recent or remote major vascular distribution infarct.  No extra-axial blood or fluid collections. Normal vascular flow voids. Bone marrow signal intensity is normal. Paranasal sinuses and mastoid air cells are clear.     1. Single small acute infarction in the right frontal white matter. 2. Numerous remote lacunar infarctions as above. 3. Advanced chronic microvascular ischemic changes. 4. Numerous parenchymal microhemorrhages as above which may be related to hypertensive encephalopathy or amyloidosis. This report was flagged in Epic as abnormal. Electronically signed by: Tariq Wallace  Date:    07/03/2023 Time:    19:45      Assessment/Plan:     * CVA (cerebral vascular accident)  Admit to PCU  Consult neurology, given microhemorrhages, recommend SBP below 180, prn labetolol ordered, increased clonidine to TID    Antithrombotics for secondary stroke prevention: Antiplatelets: Aspirin: 81 mg daily  Clopidogrel: 75 mg daily    Statins for secondary stroke prevention and hyperlipidemia, if present:   Statins: Atorvastatin- 80 mg daily    Aggressive risk factor modification: HTN, DM, HLD     Rehab efforts: The patient has been evaluated by a stroke team provider and the therapy needs have been fully considered based off the presenting complaints and exam findings. The following therapy evaluations are needed: PT evaluate and treat, OT evaluate and treat    Diagnostics ordered/pending: completed    VTE prophylaxis: None: Reason for No Pharmacological VTE Prophylaxis: microhemorrhages    BP parameters: Infarct: keep SBP <180        Hypertensive emergency  Patient has a current diagnosis of Hypertensive emergency with end organ damage evidenced by acute ischemic stroke which is controlled.  Latest blood pressure and vitals reviewed-   Temp:  [97.9 °F (36.6 °C)-98.1 °F (36.7 °C)]   Pulse:  [61-80]   Resp:  [18]   BP: (165-232)/()   SpO2:  [97 %-100 %] .   Patient currently on IV antihypertensives.   Home meds for hypertension were reviewed and noted below.   Hypertension Medications             amLODIPine (NORVASC) 10 MG tablet Take 1 tablet (10 mg total) by mouth once daily.    6/6/23 2111 - Original Entry by Cristina Ren MD  Authorizing Provider: Cristina Ren MD             Name:  amLODIPine (NORVASC) 10 MG tablet Start date:  6/6/23 End date:  6/5/24 9996    Medication:  AMLODIPINE 10 MG ORAL TAB [[id number]]    Dose:  10 mg Route:  Oral Frequency:  Daily    Sig: Take 1 tablet (10 mg total) by mouth once daily.    Instructions:  --           Changes to the order are displayed in red.  Note that  there may be changes made to the order that are not shown in this report, such as changes to details about ingredients.     cloNIDine (CATAPRES) 0.1 MG tablet Take 0.1 mg by mouth once daily.    hydrALAZINE (APRESOLINE) 100 MG tablet Take 1 tablet (100 mg total) by mouth 3 (three) times daily.    2/16/23 1537 - Original Entry by Cristina Ren MD  Authorizing Provider: Cristina Ren MD             Name:  hydrALAZINE (APRESOLINE) 100 MG tablet Start date:  2/16/23 End date:  2/16/24 2359    Medication:  HYDRALAZINE 100 MG ORAL TAB [[id number]]    Dose:  100 mg Route:  Oral Frequency:  3 times daily    Sig: Take 1 tablet (100 mg total) by mouth 3 (three) times daily.    Instructions:  --           Changes to the order are displayed in red.  Note that there may be changes made to the order that are not shown in this report, such as changes to details about ingredients.     losartan (COZAAR) 100 MG tablet Take 1 tablet (100 mg total) by mouth once daily.    6/6/23 2111 - Original Entry by Cristina Ren MD  Authorizing Provider: Cristina Ren MD             Name:  losartan (COZAAR) 100 MG tablet Start date:  6/6/23 End date:  6/5/24 2359    Medication:  LOSARTAN 100 MG ORAL TAB [[id number]]    Dose:  100 mg Route:  Oral Frequency:  Daily    Sig: Take 1 tablet (100 mg total) by mouth once daily.    Instructions:  --           Changes to the order are displayed in red.  Note that there may be changes made to the order that are not shown in this report, such as changes to details about ingredients.     metoprolol succinate (TOPROL-XL) 100 MG 24 hr tablet Take 1 tablet (100 mg total) by mouth once daily.    6/15/23 2137 - Modified by Yana Allison  Authorizing Provider: Cristina Ren MD             Name:  metoprolol succinate (TOPROL-XL) 100 MG 24 hr tablet Start date:  2/16/23 End date:  6/15/23 2359    Medication:  METOPROLOL SUCCINATE 100 MG ORAL TB24 [[id number]]    Dose:  100 mg Route:  Oral Frequency:  Daily    Sig: Take 1  tablet (100 mg total) by mouth once daily.    Instructions:  --           2/16/23 1537 - Original Entry by Cristina Ren MD  Authorizing Provider: Cristina Ren MD             Name:  metoprolol succinate (TOPROL-XL) 100 MG 24 hr tablet Start date:  2/16/23 End date:  5/17/23 0542    Medication:  METOPROLOL SUCCINATE 100 MG ORAL TB24 [[id number]]    Dose:  100 mg Route:  Oral Frequency:  Daily    Sig: Take 1 tablet (100 mg total) by mouth once daily.    Instructions:  --           Changes to the order are displayed in red.  Note that there may be changes made to the order that are not shown in this report, such as changes to details about ingredients.               Medication adjustment for hospital antihypertensives is as follows- IV labetolol PRN increased clonidine to TID, consider increasing strength. cardene gtt if resistant     Will aim for controlled BP reduction by medications noted above. Monitor and mitigate end organ damage as indicated.    Type 2 diabetes mellitus with both eyes affected by moderate nonproliferative retinopathy and macular edema, with long-term current use of insulin  Patient's FSGs are controlled on current medication regimen.  Last A1c reviewed-   Lab Results   Component Value Date    HGBA1C 10.2 (H) 05/03/2023     Most recent fingerstick glucose reviewed-   Recent Labs   Lab 07/03/23  1533   POCTGLUCOSE 131*     Current correctional scale  Medium  Maintain anti-hyperglycemic dose as follows-   Antihyperglycemics (From admission, onward)    Start     Stop Route Frequency Ordered    07/04/23 2100  insulin detemir U-100 (Levemir) pen 40 Units         -- SubQ Nightly 07/03/23 2321        Hold Oral hypoglycemics while patient is in the hospital.    Mixed hyperlipidemia  Continue statin        VTE Risk Mitigation (From admission, onward)         Ordered     IP VTE HIGH RISK PATIENT  Once         07/03/23 2321     Place sequential compression device  Until discontinued         07/03/23 2321                            Rita Espinal NP  Department of Hospital Medicine  [facility]/Surg

## 2023-07-04 NOTE — SUBJECTIVE & OBJECTIVE
Past Medical History:   Diagnosis Date    Arthritis     Complete tear of left rotator cuff 11/09/2017    COPD (chronic obstructive pulmonary disease)     COVID-19 infection 07/02/2021    Diabetes mellitus     H/o Cerebrovascular accident (CVA) of left pontine structure 12/12/2019    Hypertension     Personal history of colonic polyps     Stroke     Wears glasses        Past Surgical History:   Procedure Laterality Date    COLONOSCOPY N/A 4/11/2017    Procedure: COLONOSCOPY;  Surgeon: Jared Antonio MD;  Location: [facility];  Service: Endoscopy;  Laterality: N/A;    HYSTERECTOMY      OOPHORECTOMY      SHOULDER SURGERY      R    TRANSESOPHAGEAL ECHOCARDIOGRAM WITH POSSIBLE CARDIOVERSION (GT W/ POSS CARDIOVERSION) N/A 5/4/2023    Procedure: Transesophageal echo (GT) intra-procedure log documentation;  Surgeon: Blade Phillip MD;  Location: [facility] CATH/EP LAB;  Service: Cardiology;  Laterality: N/A;       Review of patient's allergies indicates:  No Known Allergies    No current facility-administered medications on file prior to encounter.     Current Outpatient Medications on File Prior to Encounter   Medication Sig    amLODIPine (NORVASC) 10 MG tablet Take 1 tablet (10 mg total) by mouth once daily.    aspirin 81 MG Chew Take 1 tablet (81 mg total) by mouth once daily.    atorvastatin (LIPITOR) 80 MG tablet Take 1 tablet (80 mg total) by mouth every evening.    cloNIDine (CATAPRES) 0.1 MG tablet Take 0.1 mg by mouth once daily.    dexAMETHasone (OZURDEX) 0.7 mg Impl intravitreal implant 0.7 mg by Intravitreal route once.    hydrALAZINE (APRESOLINE) 100 MG tablet Take 1 tablet (100 mg total) by mouth 3 (three) times daily.    insulin aspart U-100 (NOVOLOG) 100 unit/mL injection Inject 12 Units into the skin 3 (three) times daily before meals.    insulin degludec (TRESIBA FLEXTOUCH U-100) 100 unit/mL (3 mL) insulin pen ADMINISTER 50 UNITS UNDER THE SKIN EVERY EVENING    latanoprost 0.005 % ophthalmic solution  "Place 1 drop into both eyes once daily.    losartan (COZAAR) 100 MG tablet Take 1 tablet (100 mg total) by mouth once daily.    metoprolol succinate (TOPROL-XL) 100 MG 24 hr tablet Take 1 tablet (100 mg total) by mouth once daily.    pen needle, diabetic (BD ULTRA-FINE SHORT PEN NEEDLE) 31 gauge x 5/16" Ndle 1 pen by Misc.(Non-Drug; Combo Route) route 4 (four) times daily.    tiotropium (SPIRIVA) 18 mcg inhalation capsule Inhale 1 capsule (18 mcg total) into the lungs once daily. Controller    [DISCONTINUED] blood-glucose meter (TRUE METRIX GLUCOSE METER) kit Use as instructed    [DISCONTINUED] lancing device (TRUEDRAW LANCING DEVICE) Misc Inject 1 Device into the skin 2 (two) times daily.     Family History       Problem Relation (Age of Onset)    Anemia Daughter    Asthma Mother    Diabetes Mother, Father, Brother    Hypertension Mother, Brother          Tobacco Use    Smoking status: Never    Smokeless tobacco: Never   Substance and Sexual Activity    Alcohol use: No    Drug use: No    Sexual activity: Not Currently     Review of Systems   Unable to perform ROS: Dementia   Respiratory:  Negative for shortness of breath.    Cardiovascular:  Negative for chest pain.   Neurological:  Positive for speech difficulty and weakness.   Objective:     Vital Signs (Most Recent):  Temp: 98.1 °F (36.7 °C) (07/03/23 1456)  Pulse: 66 (07/04/23 0051)  Resp: 18 (07/03/23 1456)  BP: (!) 176/79 (07/04/23 0051)  SpO2: 98 % (07/04/23 0051) Vital Signs (24h Range):  Temp:  [98.1 °F (36.7 °C)] 98.1 °F (36.7 °C)  Pulse:  [61-80] 66  Resp:  [18] 18  SpO2:  [97 %-100 %] 98 %  BP: (165-232)/() 176/79     Weight: 59 kg (130 lb)  Body mass index is 26.26 kg/m².     Physical Exam  Vitals and nursing note reviewed.   Constitutional:       Appearance: Normal appearance.   HENT:      Head: Normocephalic and atraumatic.      Nose: Nose normal.      Mouth/Throat:      Mouth: Mucous membranes are moist.      Pharynx: Oropharynx is clear. "   Eyes:      Extraocular Movements: Extraocular movements intact.      Pupils: Pupils are equal, round, and reactive to light.   Cardiovascular:      Rate and Rhythm: Normal rate and regular rhythm.      Pulses: Normal pulses.      Heart sounds: Normal heart sounds.   Pulmonary:      Effort: Pulmonary effort is normal.      Breath sounds: Normal breath sounds.   Abdominal:      General: Bowel sounds are normal.      Palpations: Abdomen is soft.   Musculoskeletal:         General: Normal range of motion.      Cervical back: Normal range of motion and neck supple.   Skin:     General: Skin is warm and dry.      Capillary Refill: Capillary refill takes less than 2 seconds.   Neurological:      Mental Status: She is alert.      Comments: Does not answer many questions. Right arm is contracted, no grasp, RLE 3/5 strength. RLE and RUE 5/5   Psychiatric:         Behavior: Behavior normal.            CRANIAL NERVES     CN III, IV, VI   Pupils are equal, round, and reactive to light.     Significant Labs: All pertinent labs within the past 24 hours have been reviewed.  CBC:   Recent Labs   Lab 07/03/23  1611   WBC 9.46   HGB 11.9*   HCT 34.4*        CMP:   Recent Labs   Lab 07/03/23  1611      K 4.8   *   CO2 18*   *   BUN 27*   CREATININE 1.4   CALCIUM 9.5   PROT 8.3   ALBUMIN 3.9   BILITOT 0.4   ALKPHOS 79   AST 25   ALT 34   ANIONGAP 12       Significant Imaging: I have reviewed all pertinent imaging results/findings within the past 24 hours.    CT Head Without Contrast    Result Date: 7/3/2023  EXAMINATION: CT HEAD WITHOUT CONTRAST CLINICAL HISTORY: Neuro deficit, acute, stroke suspected; TECHNIQUE: Low dose axial images were obtained through the head.  Coronal and sagittal reformations were also performed. Contrast was not administered. COMPARISON: 05/02/2023 FINDINGS: there is mild dilatation of ventricles, sulci, fissures. There is decreased density in white matter suggesting microvascular  ischemic change appearing advanced and with old lacunar infarcts in the basal ganglia bilaterally and left thalamus and the regina. No acute intracranial hemorrhage. No intracranial mass effect. No acute major vascular territory infarct. Note is made that MRI is typically more sensitive than CT particular for detection of early or small nonhemorrhagic infarct. The calvarium appears intact, mastoids well pneumatized, visualized paranasal sinuses essentially clear.     No acute intracranial findings.  Mild involutional change and findings suggesting microvascular ischemic change and old lacunar infarcts. Electronically signed by: Rebeca Schmidt MD Date:    07/03/2023 Time:    16:33    MRI Brain Without Contrast    Result Date: 7/3/2023  EXAMINATION: MRI BRAIN WITHOUT CONTRAST CLINICAL HISTORY: Stroke, follow up; TECHNIQUE: Multiplanar multisequence MR imaging of the brain was performed without intravenous contrast. COMPARISON: MRI brain from 05/02/2023.  CT head from 07/03/2023 taken at 15:52. FINDINGS: There is a single small focus of restricted diffusion in the right frontal lobe, just anterior to the frontal horn of the lateral ventricle.  No additional foci of restricted diffusion are seen.  There are multiple remote lacunar infarctions involving the regina, the bilateral basal ganglia and thalami, and the bilateral cerebellar hemispheres.  There is confluent T2/FLAIR hyperintense signal in the supratentorial white matter compatible with chronic microvascular ischemic changes.  There are numerous foci of susceptibility/parenchymal microhemorrhages in the bilateral cerebral hemispheres, the brainstem, and the right cerebellar hemisphere.  Ventricles are normal in size for age without evidence of hydrocephalus. No mass, hemorrhage, or recent or remote major vascular distribution infarct.  No extra-axial blood or fluid collections. Normal vascular flow voids. Bone marrow signal intensity is normal. Paranasal sinuses and  mastoid air cells are clear.     1. Single small acute infarction in the right frontal white matter. 2. Numerous remote lacunar infarctions as above. 3. Advanced chronic microvascular ischemic changes. 4. Numerous parenchymal microhemorrhages as above which may be related to hypertensive encephalopathy or amyloidosis. This report was flagged in Epic as abnormal. Electronically signed by: Tariq Wallace Date:    07/03/2023 Time:    19:45

## 2023-07-04 NOTE — CONSULTS
"[facility]  Neurology Consult    Patient Name: Steff Johnson   MRN: [id number]  : 1960  TODAY'S DATE: 2023  ADMIT DATE: 7/3/2023  2:59 PM                                          CONSULTED PROVIDER: Jennifer Tony MD, Neurologist. On-call Phone: [phone]  CONSULT REQUESTED BY: Darryl Riggs MD     Chief Complaint   Patient presents with    Weakness     Multiple cva's / slurred speech and weakness started over weekend         HPI per EMR:  Ms. 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 It is moderate. It is associated with slurred speech and increased fatigue. She denies CP or SOB. She is a poor historian and  is at bedside who supplements and denies facial asymmetry. He states symptoms began 2-3 days ago. In the ED, CT head was negative for acute abnormality, but MRI revealed: "1. Single small acute infarction in the right frontal white matter. 2. Numerous remote lacunar infarctions as above. 3. Advanced chronic microvascular ischemic changes. 4. Numerous parenchymal microhemorrhages as above which may be related to hypertensive encephalopathy or amyloidosis." This was personally discussed with Dr. Chino Melvin who recommends SBP goal of 180 and DAPT and admission. Hospital medicine is consulted for admission.     Neurology Consult:  Patient was seen and examined by me today. She is a 63-year-old woman with prior history of multiple strokes with residual right sided weakness who presented from physical therapy with worsening generalized weakness and dysarthria. She had been working with the therapist who noticed patient was worse than prior days, so they were counseled to go to the ED for evaluation. MRI brain was obtained and showed a small right frontal acute ischemic stroke and re demonstrated multiple hypertensive microhemorrhages, " so she was admitted for further workup and management.     Review of Systems:    Unable to obtain, patient minimally verbal today.    Past Medical History:  has a past medical history of Arthritis, Complete tear of left rotator cuff (11/09/2017), COPD (chronic obstructive pulmonary disease), COVID-19 infection (07/02/2021), Diabetes mellitus, H/o Cerebrovascular accident (CVA) of left pontine structure (12/12/2019), Hypertension, Personal history of colonic polyps, Stroke, and Wears glasses.    Past Surgical History:  has a past surgical history that includes Shoulder surgery; Hysterectomy; Colonoscopy (N/A, 4/11/2017); Oophorectomy; and transesophageal echocardiogram with possible cardioversion (justyna w/ poss cardioversion) (N/A, 5/4/2023).    Family Medical History: family history includes Anemia in her daughter; Asthma in her mother; Diabetes in her brother, father, and mother; Hypertension in her brother and mother.    Social History:  reports that she has never smoked. She has never used smokeless tobacco. She reports that she does not drink alcohol and does not use drugs.    PCP: Cristina Ren MD    Allergies: Review of patient's allergies indicates:  No Known Allergies    Medications:   No current facility-administered medications on file prior to encounter.     Current Outpatient Medications on File Prior to Encounter   Medication Sig Dispense Refill    amLODIPine (NORVASC) 10 MG tablet Take 1 tablet (10 mg total) by mouth once daily. 90 tablet 3    aspirin 81 MG Chew Take 1 tablet (81 mg total) by mouth once daily. 30 tablet 0    atorvastatin (LIPITOR) 80 MG tablet Take 1 tablet (80 mg total) by mouth every evening. 30 tablet 0    cloNIDine (CATAPRES) 0.1 MG tablet Take 0.1 mg by mouth once daily.      dexAMETHasone (OZURDEX) 0.7 mg Impl intravitreal implant 0.7 mg by Intravitreal route once.      hydrALAZINE (APRESOLINE) 100 MG tablet Take 1 tablet (100 mg total) by mouth 3 (three) times daily. 270 tablet 1  "   insulin aspart U-100 (NOVOLOG) 100 unit/mL injection Inject 12 Units into the skin 3 (three) times daily before meals. 10.8 mL 11    insulin degludec (TRESIBA FLEXTOUCH U-100) 100 unit/mL (3 mL) insulin pen ADMINISTER 50 UNITS UNDER THE SKIN EVERY EVENING 12 pen 1    latanoprost 0.005 % ophthalmic solution Place 1 drop into both eyes once daily. 7.5 mL 6    losartan (COZAAR) 100 MG tablet Take 1 tablet (100 mg total) by mouth once daily. 90 tablet 3    metoprolol succinate (TOPROL-XL) 100 MG 24 hr tablet Take 1 tablet (100 mg total) by mouth once daily. 90 tablet 1    pen needle, diabetic (BD ULTRA-FINE SHORT PEN NEEDLE) 31 gauge x 5/16" Ndle 1 pen by Misc.(Non-Drug; Combo Route) route 4 (four) times daily. 300 each 5    tiotropium (SPIRIVA) 18 mcg inhalation capsule Inhale 1 capsule (18 mcg total) into the lungs once daily. Controller 90 capsule 3    [DISCONTINUED] blood-glucose meter (TRUE METRIX GLUCOSE METER) kit Use as instructed 1 each 0    [DISCONTINUED] lancing device (TRUEDRAW LANCING DEVICE) Misc Inject 1 Device into the skin 2 (two) times daily. 1 each 3     Scheduled Meds:   amLODIPine  10 mg Oral Daily    aspirin  81 mg Oral Daily    atorvastatin  80 mg Oral QHS    cloNIDine  0.1 mg Oral TID    clopidogreL  75 mg Oral Daily    hydrALAZINE  100 mg Oral TID    insulin detemir U-100 (Levemir)  40 Units Subcutaneous QHS    latanoprost  1 drop Both Eyes Daily    losartan  100 mg Oral Daily    metoprolol succinate  100 mg Oral Daily    senna-docusate 8.6-50 mg  1 tablet Oral BID    tiotropium bromide  2 puff Inhalation Daily     Continuous Infusions:   sodium chloride 0.9%       PRN Meds:.dextrose 10%, dextrose 10%, glucagon (human recombinant), glucose, glucose, insulin aspart U-100, labetaloL, ondansetron, polyethylene glycol, sodium chloride 0.9%, sodium chloride 0.9%      Physical Exam  Current Vitals:  Vitals:    07/04/23 0740   BP: (!) 171/74   Pulse: (!) 58   Resp: 18   Temp: 97.7 °F (36.5 °C) "       Physical Exam:  General: AO x4  HEENT: PERRL, EOMI  CV: RRR  Lungs: no respiratory distress  Abdomen: soft, non tender    Neurological Exam  MENTAL STATUS: Patient awake and oriented to place, and person. Affect normal.  CRANIAL NERVES II-XII: Pupils equal, round and reactive to light. Extraocular movements full and intact.  Right facial asymmetry.  Speech is dysarthric, patient only verbalizing a few words at a time  MOTOR: Normal bulk. Tone normal and symmetrical throughout.  No abnormal movements. No tremor.   Strength 4/5 in right upper and lower extremities, 5/5 on left upper and 4+/5 left lower extremities.  REFLEXES: DTRs 1+; normal and symmetric throughout.   SENSATION: Sensation grossly intact to fine touch.  COORDINATION: Finger-to-nose normal for age and symmetric.  STATION: Romberg deferred.  GAIT: Deferred.    NIH Stroke Scale      Date: 07/04/2023  Time: 1530  Person Administering Scale: Jennifer Tony MD    Administer stroke scale items in the order listed. Record performance in each category after each subscale exam. Do not go back and change scores. Follow directions provided for each exam technique. Scores should reflect what the patient does, not what the clinician thinks the patient can do. The clinician should record answers while administering the exam and work quickly. Except where indicated, the patient should not be coached (i.e., repeated requests to patient to make a special effort).      1a  Level of consciousness: 0=alert; keenly responsive   1b. LOC questions:  1=Answers one task correctly   1c. LOC commands: 0=Answers both tasks correctly   2.  Best Gaze: 0=normal   3.  Visual: 0=No visual loss   4. Facial Palsy: 1=Minor paralysis (flattened nasolabial fold, asymmetric on smiling)   5a.  Motor left arm: 0=No drift, limb holds 90 (or 45) degrees for full 10 seconds   5b.  Motor right arm: 1=Drift, limb holds 90 (or 45) degrees but drifts down before full 10 seconds: does not  hit bed   6a. motor left le=No drift, limb holds 90 (or 45) degrees for full 10 seconds   6b  Motor right le=Drift, limb holds 90 (or 45) degrees but drifts down before full 10 seconds: does not hit bed   7. Limb Ataxia: 0=Absent   8.  Sensory: 0=Normal; no sensory loss   9. Best Language:  1=Mild to moderate aphasia; some obvious loss of fluency or facility of comprehension without significant limitation on ideas expressed or form of expression.   10. Dysarthria: 1=Mild to moderate, patient slurs at least some words and at worst, can be understood with some difficulty   11. Extinction and Inattention: 0=No abnormality    Total:   6       Laboratory Data & Studies    Recent Labs   Lab 23  16123  0435   WBC 9.46 9.73   HGB 11.9* 10.1*    378   MCV 81* 80*       Recent Labs   Lab 23  16123  0435    142   K 4.8 3.9   * 114*   CO2 18* 20*   BUN 27* 24*   * 110   CALCIUM 9.5 8.8   MG  --  2.0   PHOS  --  3.4       Recent Labs   Lab 23  16123  0435   PROT 8.3 6.8   ALBUMIN 3.9 3.2*   BILITOT 0.4 0.4   AST 25 17   ALT 34 26   ALKPHOS 79 73       Recent Labs   Lab 23  0435   INR 1.0   APTT 29.6       No results for input(s): HGBA1C, CHOL, TRIG, LDLCALC, HDL, TSH in the last 168 hours.    Microbiology:  Microbiology Results (last 7 days)       ** No results found for the last 168 hours. **            Imaging:  CT Head Without Contrast    Result Date: 7/3/2023  EXAMINATION: CT HEAD WITHOUT CONTRAST CLINICAL HISTORY: Neuro deficit, acute, stroke suspected; TECHNIQUE: Low dose axial images were obtained through the head.  Coronal and sagittal reformations were also performed. Contrast was not administered. COMPARISON: 2023 FINDINGS: there is mild dilatation of ventricles, sulci, fissures. There is decreased density in white matter suggesting microvascular ischemic change appearing advanced and with old lacunar infarcts in the basal ganglia  bilaterally and left thalamus and the regina. No acute intracranial hemorrhage. No intracranial mass effect. No acute major vascular territory infarct. Note is made that MRI is typically more sensitive than CT particular for detection of early or small nonhemorrhagic infarct. The calvarium appears intact, mastoids well pneumatized, visualized paranasal sinuses essentially clear.     No acute intracranial findings.  Mild involutional change and findings suggesting microvascular ischemic change and old lacunar infarcts. Electronically signed by: Rebeca Schmidt MD Date:    07/03/2023 Time:    16:33    MRI Brain Without Contrast    Result Date: 7/3/2023  EXAMINATION: MRI BRAIN WITHOUT CONTRAST CLINICAL HISTORY: Stroke, follow up; TECHNIQUE: Multiplanar multisequence MR imaging of the brain was performed without intravenous contrast. COMPARISON: MRI brain from 05/02/2023.  CT head from 07/03/2023 taken at 15:52. FINDINGS: There is a single small focus of restricted diffusion in the right frontal lobe, just anterior to the frontal horn of the lateral ventricle.  No additional foci of restricted diffusion are seen.  There are multiple remote lacunar infarctions involving the regina, the bilateral basal ganglia and thalami, and the bilateral cerebellar hemispheres.  There is confluent T2/FLAIR hyperintense signal in the supratentorial white matter compatible with chronic microvascular ischemic changes.  There are numerous foci of susceptibility/parenchymal microhemorrhages in the bilateral cerebral hemispheres, the brainstem, and the right cerebellar hemisphere.  Ventricles are normal in size for age without evidence of hydrocephalus. No mass, hemorrhage, or recent or remote major vascular distribution infarct.  No extra-axial blood or fluid collections. Normal vascular flow voids. Bone marrow signal intensity is normal. Paranasal sinuses and mastoid air cells are clear.     1. Single small acute infarction in the right frontal  white matter. 2. Numerous remote lacunar infarctions as above. 3. Advanced chronic microvascular ischemic changes. 4. Numerous parenchymal microhemorrhages as above which may be related to hypertensive encephalopathy or amyloidosis. This report was flagged in Epic as abnormal. Electronically signed by: Tariq Wallace Date:    07/03/2023 Time:    19:45    X-Ray Chest AP Portable    Result Date: 6/16/2023  EXAMINATION: XR CHEST AP PORTABLE CLINICAL INDICATION: Female, 63 years old. Hypoglycemia COMPARISON: May 2, 2023 FINDINGS: An antegrade vessels appear normal. There is no evidence of mediastinal or hilar mass. Trachea is midline. Hypoaeration of the lungs again noted. Otherwise the lungs are clear. No pleural effusion can be seen. No pneumothorax can be seen. IMPRESSION: Hypoaeration of the lungs. There is no x-ray evidence of active or acute disease and no change as compared to May 2, 2023 Electronically signed by:  Davis Cristina Jr., MD  6/16/2023 6:51 AM CDT Workstation: [id number]      Assessment:    Acute ischemic stroke in right frontal lobe - etiology ongoing  63-year-old woman with prior history of multiple strokes with residual right sided weakness who presented from physical therapy with worsening generalized weakness and dysarthria. MRI brain was obtained and showed a small right frontal acute ischemic stroke and re demonstrated multiple hypertensive microhemorrhages, so she was admitted for further workup and management. Of note, prior admissions for stroke she has had workup which has been unrevealing. CTA head and neck showed no vessel disease, she has had prior GT that showed no ZENAIDA thrombus and holter monitor which revealed no arrhythmia. She had been taking ASA 81 mg daily only in the setting of microhemorrhages, but is now presenting with another ischemic event, so starting DAPT after discussion of risks vs benefits.    Stroke workup:  CTH: no acute intracranial abnormality  CTA head and neck:  no LVO or significant stenosis  MRI brain: small right frontal acute ischemic stroke  Risk factors: A1C, TSH, LDL  Transesophageal echo 5/3/23: EF 60%, normal ZENAIDA with no thrombus and normal velocities, grade 1 plaque in transverse and descending aorta, no PFO, no left atrial dilation  Hypercoagulable workup: ordered and pending    Plan:  - Admitted to hospital medicine with q4 hour neuro checks, on telemetry, continuous pulse oximetry  - Diet after eval per SLP.  - Secondary stroke prevention with ASA 81 mg daily, plavix 75 mg daily, atorvastatin 80 mg daily. Will continue DAPT for now  - Ordered hypercoagulable panel to rule out other causes for recurrent stroke  - Risk factor stratification with HgbA1C, Fasting Lipid profile, TSH  - TE echocardiogram as above  - Maintain Euthermia with Tylenol prn temp > 37.2 degrees C.  - Assessment for rehab with PT/OT/SLP evaluation and treatment.  - BP Goal <180/105  - Cardiac enzymes and EKG   - Will follow along      DVT prophylaxis with chemo/SCD prophylaxis      [phone] 7 days from discharge.     Stroke education was provided including stroke risk factors modification and any acute neurological changes including weakness, confusion, visual changes to come straight to the ER.     All questions were answered.                              Thank you kindly for including us in the care of this patient. Please do not hesitate to contact us with any questions.      65 minutes of care time has been spent evaluating with the patient. Time includes chart review not limited to diagnostic imaging, labs, and vitals, patient assessment, discussion with family and nursing, current order evaluations, and new order entries.      Jennifer Tony MD  Neurology

## 2023-07-04 NOTE — ASSESSMENT & PLAN NOTE
Admit to PCU  Consult neurology, given microhemorrhages, recommend SBP below 180, prn labetolol ordered, increased clonidine to TID    Antithrombotics for secondary stroke prevention: Antiplatelets: Aspirin: 81 mg daily  Clopidogrel: 75 mg daily    Statins for secondary stroke prevention and hyperlipidemia, if present:   Statins: Atorvastatin- 80 mg daily    Aggressive risk factor modification: HTN, DM, HLD     Rehab efforts: The patient has been evaluated by a stroke team provider and the therapy needs have been fully considered based off the presenting complaints and exam findings. The following therapy evaluations are needed: PT evaluate and treat, OT evaluate and treat, SLP evaluate and treat    Diagnostics ordered/pending: completed    VTE prophylaxis: None: Reason for No Pharmacological VTE Prophylaxis: microhemorrhages    BP parameters: Infarct: keep SBP <180

## 2023-07-04 NOTE — NURSING
Nurses Note -- 4 Eyes      7/4/2023   4:49 AM      Skin assessed during: Admit      [x] No Altered Skin Integrity Present    []Prevention Measures Documented      [] Yes- Altered Skin Integrity Present or Discovered   [] LDA Added if Not in Epic (Describe Wound)   [] New Altered Skin Integrity was Present on Admit and Documented in LDA   [] Wound Image Taken    Wound Care Consulted? No    Attending Nurse:  Trice Lazo LPN     Second RN/Staff Member:  Samuel Limon RN

## 2023-07-04 NOTE — CARE UPDATE
07/04/23 0716   Patient Assessment/Suction   Level of Consciousness (AVPU) alert   Respiratory Effort Normal;Unlabored   Expansion/Accessory Muscles/Retractions no use of accessory muscles;no retractions;expansion symmetric   All Lung Fields Breath Sounds Anterior:;Lateral:;diminished   Rhythm/Pattern, Respiratory unlabored;pattern regular;depth regular;no shortness of breath reported   Cough Frequency no cough   PRE-TX-O2   Device (Oxygen Therapy) room air   SpO2 100 %   Pulse Oximetry Type Intermittent   $ Pulse Oximetry - Multiple Charge Pulse Oximetry - Multiple   Pulse 61   Resp 16   Inhaler   $ Inhaler Charges MDI (Metered Dose Inahler) Treatment   Daily Review of Necessity (Inhaler) completed   Respiratory Treatment Status (Inhaler) given;mouth rinsed post treatment   Treatment Route (Inhaler) breath hold;mouthpiece   Patient Position (Inhaler) HOB elevated   Post Treatment Assessment (Inhaler) breath sounds unchanged   Signs of Intolerance (Inhaler) none   Breath Sounds Post-Respiratory Treatment   Throughout All Fields Post-Treatment All Fields   Throughout All Fields Post-Treatment no change   Post-treatment Heart Rate (beats/min) 61   Post-treatment Resp Rate (breaths/min) 16

## 2023-07-04 NOTE — ASSESSMENT & PLAN NOTE
Patient's FSGs are controlled on current medication regimen.  Last A1c reviewed-   Lab Results   Component Value Date    HGBA1C 10.2 (H) 05/03/2023     Most recent fingerstick glucose reviewed-   Recent Labs   Lab 07/03/23  1533 07/04/23  1413   POCTGLUCOSE 131* 116*     Current correctional scale  Medium  Maintain anti-hyperglycemic dose as follows-   Antihyperglycemics (From admission, onward)    Start     Stop Route Frequency Ordered    07/04/23 2100  insulin detemir U-100 (Levemir) pen 40 Units         -- SubQ Nightly 07/03/23 2321        Hold Oral hypoglycemics while patient is in the hospital.

## 2023-07-04 NOTE — PT/OT/SLP EVAL
Physical Therapy Evaluation    Patient Name:  Steff Johnson   MRN:  [id number]    Recommendations:     Discharge Recommendations: rehabilitation facility, outpatient PT   Discharge Equipment Recommendations: none   Barriers to discharge: Decreased caregiver support    Assessment:     Steff Johnson is a 63 y.o. female admitted with a medical diagnosis of CVA (cerebral vascular accident).  She presents with the following impairments/functional limitations: weakness, impaired endurance, impaired self care skills, impaired functional mobility, gait instability, impaired balance, decreased safety awareness, decreased lower extremity function, impaired cognition, decreased coordination, impaired cardiopulmonary response to activity; pt has min slurred speech, knows her  but not her actual age; she is aware that she is in a hosp and that she lives with her ; bed mob & transfers with Mod/Max A; stood 2x with PT, able to take 4 steps fwd and backwards with max A x 2 person A, with a RW; pt tends to assume a kyphotic fwd head posture in standing, corrected with tactile & verbal instructions; pt had difficulty mercy glutes to pull her COG  centrally and move upper body/ trunk backwards to assume a more erect posture; pt will benefit from skilled acute PT services to improve current level of functional mob and improve overall capacity to perform activities.      Rehab Prognosis: Good; patient would benefit from acute skilled PT services to address these deficits and reach maximum level of function.    Recent Surgery: * No surgery found *      Plan:     During this hospitalization, patient to be seen daily to address the identified rehab impairments via therapeutic activities, gait training, therapeutic exercises, neuromuscular re-education and progress toward the following goals:    Plan of Care Expires:  23    Subjective     Chief Complaint: wants to get better   Patient/Family Comments/goals: pt knows  her  but states she is 53 y/o (instead of 64 y/o)  Pain/Comfort:  Pain Rating 1: 0/10  Pain Rating Post-Intervention 1: 0/10    Patients cultural, spiritual, Yarsanism conflicts given the current situation: no    Living Environment:  Lives with , pt is a poor historian, RADHA Ruiz confirmed that pt lives with her   Prior to admission, patients level of function was not known but accdg to PT notes prior to Admission, pt requires lesser assistance than max A.  Equipment used at home: bedside commode, cane, straight, rollator, shower chair, walker, rolling, wheelchair.  DME owned (not currently used): none.  Upon discharge, patient will have assistance from family.    Objective:     Communicated with RADHA Ruiz prior to session.  Patient found HOB elevated with telemetry, peripheral IV, SCD  upon PT entry to room.    General Precautions: Standard, aphasia, aspiration, fall  Orthopedic Precautions:N/A   Braces: N/A  Respiratory Status: Room air    Exams:  Cognitive Exam:  Patient is oriented to place, has min slurred speech, knows her  but not her actual age; she is aware that she is in a hosp and that she lives with her   Fine Motor Coordination:    -       Impaired   see OT dignaal, R UE seem to be weaker than the L, had difficulty raising RUE overhead  Gross Motor Coordination:  BLE 50% less coordinated, slow to move BLE nik RLE  Postural Exam:  Patient presented with the following abnormalities:    -       Rounded shoulders  -       Forward head  -       Kyphosis  Sensation:  unable to assess accurately due to pt's cognition, though pt denies any numbness or decreased sensation to all fours  Skin Integrity/Edema:      -       Skin integrity: Visible skin intact  LUE ROM: WFL  LUE Strength: WFL  RLE ROM: WFL  RLE Strength: graded 2+/5 to 3/5  LLE ROM: WFL  LLE Strength: graded 3-/5 to 3+/5    Functional Mobility:  Bed Mobility:     Rolling Left:  moderate assistance  Rolling Right: moderate  assistance  Supine to Sit: maximal assistance  Sit to Supine: maximal assistance  Transfers:     Sit to Stand:  maximal assistance with rolling walker  Gait: stood 2x with PT, able to take 4 steps fwd and backwards with max A x 2 person A, with a RW; pt tends to assume a kyphotic fwd head posture in standing  Balance: Sitting: G/F; Standing: F/P      AM-PAC 6 CLICK MOBILITY  Total Score:12       Treatment & Education:  Functional mobility training as above; pt educated on safety precautions and mobility techniques, as well as the role and benefits of PT and mobilization.    Patient left HOB elevated with all lines intact, call button in reach, bed alarm on, and RN notified.    GOALS:   Multidisciplinary Problems       Physical Therapy Goals          Problem: Physical Therapy    Goal Priority Disciplines Outcome Goal Variances Interventions   Physical Therapy Goal     PT, PT/OT Ongoing, Progressing     Description: Goals to be met by: 23     Patient will increase functional independence with mobility by performin. Supine to sit with Contact Guard Assistance  2. Sit to stand transfer with Contact Guard Assistance  3. Bed to chair transfer with Contact Guard Assistance using Rolling Walker  4. Gait  x 150 feet with Contact Guard Assistance using Rolling Walker.   5. Increased functional strength to 4/5 for use in mobility and amb.                         History:     Past Medical History:   Diagnosis Date    Arthritis     Complete tear of left rotator cuff 2017    COPD (chronic obstructive pulmonary disease)     COVID-19 infection 2021    Diabetes mellitus     H/o Cerebrovascular accident (CVA) of left pontine structure 2019    Hypertension     Personal history of colonic polyps     Stroke     Wears glasses        Past Surgical History:   Procedure Laterality Date    COLONOSCOPY N/A 2017    Procedure: COLONOSCOPY;  Surgeon: Jared Antonio MD;  Location: [facility];  Service:  Endoscopy;  Laterality: N/A;    HYSTERECTOMY      OOPHORECTOMY      SHOULDER SURGERY      R    TRANSESOPHAGEAL ECHOCARDIOGRAM WITH POSSIBLE CARDIOVERSION (GT W/ POSS CARDIOVERSION) N/A 5/4/2023    Procedure: Transesophageal echo (GT) intra-procedure log documentation;  Surgeon: Blade Phillip MD;  Location: [facility] CATH/EP LAB;  Service: Cardiology;  Laterality: N/A;       Time Tracking:     PT Received On: 07/04/23  PT Start Time: 1226     PT Stop Time: 1301  PT Total Time (min): 35 min     Billable Minutes: Evaluation 15 and Therapeutic Activity 16      07/04/2023

## 2023-07-04 NOTE — CARE UPDATE
07/04/23 0139   Patient Assessment/Suction   Level of Consciousness (AVPU) alert   Respiratory Effort Normal;Unlabored   Expansion/Accessory Muscles/Retractions no retractions;expansion symmetric   All Lung Fields Breath Sounds diminished   Rhythm/Pattern, Respiratory no shortness of breath reported;depth regular;pattern regular   Cough Frequency no cough   PRE-TX-O2   Device (Oxygen Therapy) room air   SpO2 97 %   Pulse Oximetry Type Intermittent   Respiratory Evaluation   $ Care Plan Tech Time 15 min   Evaluation For New Orders   Admitting Diagnosis CVA   Home Oxygen   Has Home Oxygen? No   Home Aerosol, MDI, DPI, and Other Treatments/Therapies   Home Respiratory Therapy Per Patient/Review of Chart Yes   DPI Home Meds/Freq Spiriva- Q day   Oxygen Care Plan   Rationale No rational found   Bronchodilator Care Plan   DPI Meds w/ frequency Spiriva 18 mcg 1 Puff - Q DAY   Rationale Maintain home respiratory medicine   Atelectasis Care Plan   Rationale No Rational Found   Airway Clearance Care Plan   Rationale No rationale found

## 2023-07-04 NOTE — CONSULTS
Attempted nutrition education consult but pt going to CT this morning. Will follow up tomorrow. Left nutrition education materials in discharge packet.

## 2023-07-04 NOTE — PROGRESS NOTES
Near the end of the CTA patient bent her arm to scratch her nose. When she did this the existing IV blew. Contrast media had already been administered but the power injector was still pushing saline. It is my estimation that very little if any contrast is in the resulting infiltrate. It is possible but unlikely that approximately 10ml may be present. I pulled the IV and noticed a raised area around the IV site that corresponds to an infiltration. This information was communicated to her nurse, Sara, by phone. Patient does not appear to be in any pain.

## 2023-07-05 LAB
ALBUMIN SERPL BCP-MCNC: 3.2 G/DL (ref 3.5–5.2)
ALP SERPL-CCNC: 75 U/L (ref 55–135)
ALT SERPL W/O P-5'-P-CCNC: 26 U/L (ref 10–44)
ANION GAP SERPL CALC-SCNC: 8 MMOL/L (ref 8–16)
AST SERPL-CCNC: 17 U/L (ref 10–40)
BASOPHILS # BLD AUTO: 0.05 K/UL (ref 0–0.2)
BASOPHILS NFR BLD: 0.7 % (ref 0–1.9)
BILIRUB SERPL-MCNC: 0.5 MG/DL (ref 0.1–1)
BUN SERPL-MCNC: 24 MG/DL (ref 8–23)
CALCIUM SERPL-MCNC: 8.8 MG/DL (ref 8.7–10.5)
CHLORIDE SERPL-SCNC: 111 MMOL/L (ref 95–110)
CO2 SERPL-SCNC: 22 MMOL/L (ref 23–29)
CREAT SERPL-MCNC: 1.1 MG/DL (ref 0.5–1.4)
DIFFERENTIAL METHOD: ABNORMAL
EOSINOPHIL # BLD AUTO: 0.3 K/UL (ref 0–0.5)
EOSINOPHIL NFR BLD: 3.4 % (ref 0–8)
ERYTHROCYTE [DISTWIDTH] IN BLOOD BY AUTOMATED COUNT: 14.5 % (ref 11.5–14.5)
EST. GFR  (NO RACE VARIABLE): 56 ML/MIN/1.73 M^2
GLUCOSE SERPL-MCNC: 129 MG/DL (ref 70–110)
HCT VFR BLD AUTO: 29 % (ref 37–48.5)
HGB BLD-MCNC: 10.1 G/DL (ref 12–16)
IMM GRANULOCYTES # BLD AUTO: 0.04 K/UL (ref 0–0.04)
IMM GRANULOCYTES NFR BLD AUTO: 0.5 % (ref 0–0.5)
LYMPHOCYTES # BLD AUTO: 2 K/UL (ref 1–4.8)
LYMPHOCYTES NFR BLD: 26.3 % (ref 18–48)
MCH RBC QN AUTO: 28 PG (ref 27–31)
MCHC RBC AUTO-ENTMCNC: 34.8 G/DL (ref 32–36)
MCV RBC AUTO: 80 FL (ref 82–98)
MONOCYTES # BLD AUTO: 0.7 K/UL (ref 0.3–1)
MONOCYTES NFR BLD: 9.2 % (ref 4–15)
NEUTROPHILS # BLD AUTO: 4.6 K/UL (ref 1.8–7.7)
NEUTROPHILS NFR BLD: 59.9 % (ref 38–73)
NRBC BLD-RTO: 0 /100 WBC
PLATELET # BLD AUTO: 388 K/UL (ref 150–450)
PMV BLD AUTO: 10.3 FL (ref 9.2–12.9)
POCT GLUCOSE: 108 MG/DL (ref 70–110)
POCT GLUCOSE: 115 MG/DL (ref 70–110)
POCT GLUCOSE: 135 MG/DL (ref 70–110)
POCT GLUCOSE: 142 MG/DL (ref 70–110)
POTASSIUM SERPL-SCNC: 4.2 MMOL/L (ref 3.5–5.1)
PROT SERPL-MCNC: 6.8 G/DL (ref 6–8.4)
RBC # BLD AUTO: 3.61 M/UL (ref 4–5.4)
SODIUM SERPL-SCNC: 141 MMOL/L (ref 136–145)
WBC # BLD AUTO: 7.63 K/UL (ref 3.9–12.7)

## 2023-07-05 PROCEDURE — 92610 EVALUATE SWALLOWING FUNCTION: CPT

## 2023-07-05 PROCEDURE — 36415 COLL VENOUS BLD VENIPUNCTURE: CPT | Performed by: NURSE PRACTITIONER

## 2023-07-05 PROCEDURE — 85025 COMPLETE CBC W/AUTO DIFF WBC: CPT | Performed by: NURSE PRACTITIONER

## 2023-07-05 PROCEDURE — 25000003 PHARM REV CODE 250: Performed by: NURSE PRACTITIONER

## 2023-07-05 PROCEDURE — 80053 COMPREHEN METABOLIC PANEL: CPT | Performed by: NURSE PRACTITIONER

## 2023-07-05 PROCEDURE — 97530 THERAPEUTIC ACTIVITIES: CPT

## 2023-07-05 PROCEDURE — 97110 THERAPEUTIC EXERCISES: CPT

## 2023-07-05 PROCEDURE — 25000003 PHARM REV CODE 250: Performed by: STUDENT IN AN ORGANIZED HEALTH CARE EDUCATION/TRAINING PROGRAM

## 2023-07-05 PROCEDURE — 94761 N-INVAS EAR/PLS OXIMETRY MLT: CPT

## 2023-07-05 PROCEDURE — 11000001 HC ACUTE MED/SURG PRIVATE ROOM

## 2023-07-05 PROCEDURE — 92523 SPEECH SOUND LANG COMPREHEN: CPT

## 2023-07-05 RX ORDER — CLOPIDOGREL BISULFATE 75 MG/1
75 TABLET ORAL DAILY
Qty: 30 TABLET | Refills: 11 | Status: ON HOLD | OUTPATIENT
Start: 2023-07-06 | End: 2023-08-11 | Stop reason: HOSPADM

## 2023-07-05 RX ORDER — CLONIDINE HYDROCHLORIDE 0.1 MG/1
0.1 TABLET ORAL 3 TIMES DAILY
Qty: 90 TABLET | Refills: 11 | Status: ON HOLD | OUTPATIENT
Start: 2023-07-05 | End: 2024-02-29

## 2023-07-05 RX ADMIN — HYDRALAZINE HYDROCHLORIDE 100 MG: 25 TABLET, FILM COATED ORAL at 11:07

## 2023-07-05 RX ADMIN — LATANOPROST 1 DROP: 50 SOLUTION OPHTHALMIC at 09:07

## 2023-07-05 RX ADMIN — DOCUSATE SODIUM AND SENNOSIDES 1 TABLET: 8.6; 5 TABLET, FILM COATED ORAL at 11:07

## 2023-07-05 RX ADMIN — ATORVASTATIN CALCIUM 80 MG: 40 TABLET, FILM COATED ORAL at 10:07

## 2023-07-05 RX ADMIN — HYDRALAZINE HYDROCHLORIDE 100 MG: 25 TABLET, FILM COATED ORAL at 05:07

## 2023-07-05 RX ADMIN — ASPIRIN 81 MG CHEWABLE TABLET 81 MG: 81 TABLET CHEWABLE at 11:07

## 2023-07-05 RX ADMIN — CLOPIDOGREL BISULFATE 75 MG: 75 TABLET, FILM COATED ORAL at 11:07

## 2023-07-05 RX ADMIN — DOCUSATE SODIUM AND SENNOSIDES 1 TABLET: 8.6; 5 TABLET, FILM COATED ORAL at 10:07

## 2023-07-05 RX ADMIN — HYDRALAZINE HYDROCHLORIDE 100 MG: 25 TABLET, FILM COATED ORAL at 10:07

## 2023-07-05 RX ADMIN — LOSARTAN POTASSIUM 100 MG: 100 TABLET, FILM COATED ORAL at 11:07

## 2023-07-05 RX ADMIN — INSULIN DETEMIR 40 UNITS: 100 INJECTION, SOLUTION SUBCUTANEOUS at 10:07

## 2023-07-05 RX ADMIN — TIOTROPIUM BROMIDE INHALATION SPRAY 2 PUFF: 3.12 SPRAY, METERED RESPIRATORY (INHALATION) at 07:07

## 2023-07-05 NOTE — PROGRESS NOTES
"[facility] Medicine  Progress Note    Patient Name: Steff Johnson  MRN: [id number]  Patient Class: IP- Inpatient   Admission Date: 7/3/2023  Length of Stay: 2 days  Attending Physician: Darryl Riggs MD  Primary Care Provider: Cristina Ren MD        Subjective:     Principal Problem:CVA (cerebral vascular accident)        HPI:  Ms. 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 It is moderate. It is associated with slurred speech and increased fatigue. She denies CP or SOB. She is a poor historian and  is at bedside who supplements and denies facial asymmetry. He states symptoms began 2-3 days ago. In the ED, CT head was negative for acute abnormality, but MRI revealed: "1. Single small acute infarction in the right frontal white matter. 2. Numerous remote lacunar infarctions as above. 3. Advanced chronic microvascular ischemic changes. 4. Numerous parenchymal microhemorrhages as above which may be related to hypertensive encephalopathy or amyloidosis." This was personally discussed with Dr. Chino Melvin who recommends SBP goal of 180 and DAPT and admission. Hospital medicine is consulted for admission.       Overview/Hospital Course:  Patient was admitted to the hospital with new onset CVA.  He was started on standard stroke protocol.  MRI revealed possible microhemorrhage, the patient was monitored closely in the hospital for any extension of such.  She was assessed by PT, OT, and speech language pathology.  Patient was also assessed by Neurology, and started on anti-platelet and high-dose intensity statin.  Patient's blood pressure was kept well controlled less than 180/110 given her history of microhemorrhages.        Interval History:  Patient seen and examined.  No acute events.  Remains aphasic.  Plan of care reviewed with the patient and " the nurse at the bedside in detail.      Objective:     Vital Signs (Most Recent):  Temp: 97.1 °F (36.2 °C) (07/05/23 1342)  Pulse: (!) 52 (07/05/23 1342)  Resp: 16 (07/05/23 1342)  BP: (!) 152/70 (07/05/23 1342)  SpO2: 99 % (07/05/23 1342) Vital Signs (24h Range):  Temp:  [97.1 °F (36.2 °C)-97.7 °F (36.5 °C)] 97.1 °F (36.2 °C)  Pulse:  [49-88] 52  Resp:  [12-20] 16  SpO2:  [98 %-100 %] 99 %  BP: (132-160)/(60-73) 152/70     Weight: 59 kg (130 lb)  Body mass index is 26.26 kg/m².    Intake/Output Summary (Last 24 hours) at 7/5/2023 1531  Last data filed at 7/5/2023 0640  Gross per 24 hour   Intake 118 ml   Output 150 ml   Net -32 ml           Physical Exam  Vitals and nursing note reviewed.   Constitutional:       General: She is not in acute distress.  Eyes:      Pupils: Pupils are equal, round, and reactive to light.   Cardiovascular:      Rate and Rhythm: Normal rate and regular rhythm.      Heart sounds: No murmur heard.  Pulmonary:      Effort: Pulmonary effort is normal.      Breath sounds: Normal breath sounds.   Abdominal:      General: There is no distension.      Palpations: Abdomen is soft.      Tenderness: There is no abdominal tenderness.   Skin:     Coloration: Skin is not pale.      Findings: No rash.   Neurological:      Mental Status: She is alert and oriented to person, place, and time.      Motor: Weakness present.      Coordination: Coordination abnormal.      Gait: Gait abnormal.      Deep Tendon Reflexes: Reflexes abnormal.      Comments: Aphasia noted           Significant Labs: All pertinent labs within the past 24 hours have been reviewed.    Significant Imaging: I have reviewed all pertinent imaging results/findings within the past 24 hours.        Assessment/Plan:      * CVA (cerebral vascular accident)  Admit to PCU  Consult neurology, given microhemorrhages, recommend SBP below 180, prn labetolol ordered, increased clonidine to TID    Antithrombotics for secondary stroke prevention:  Antiplatelets: Aspirin: 81 mg daily  Clopidogrel: 75 mg daily    Statins for secondary stroke prevention and hyperlipidemia, if present:   Statins: Atorvastatin- 80 mg daily    Aggressive risk factor modification: HTN, DM, HLD     Rehab efforts: The patient has been evaluated by a stroke team provider and the therapy needs have been fully considered based off the presenting complaints and exam findings. The following therapy evaluations are needed: PT evaluate and treat, OT evaluate and treat, SLP evaluate and treat    Diagnostics ordered/pending: completed    VTE prophylaxis: None: Reason for No Pharmacological VTE Prophylaxis: microhemorrhages    BP parameters: Infarct: keep SBP <180        Aphasia  Noted, speech language pathology consulted.      Hypertensive emergency  Patient has a current diagnosis of Hypertensive emergency with end organ damage evidenced by acute ischemic stroke which is controlled.  Latest blood pressure and vitals reviewed-   Temp:  [97.1 °F (36.2 °C)-97.7 °F (36.5 °C)]   Pulse:  [49-88]   Resp:  [12-20]   BP: (132-160)/(60-73)   SpO2:  [98 %-100 %] .   Patient currently on IV antihypertensives.   Home meds for hypertension were reviewed.    Medication adjustment for hospital antihypertensives is as follows- IV labetolol PRN increased clonidine to TID, consider increasing strength. cardene gtt if resistant     Will aim for controlled BP reduction by medications noted above. Monitor and mitigate end organ damage as indicated.    Type 2 diabetes mellitus with both eyes affected by moderate nonproliferative retinopathy and macular edema, with long-term current use of insulin  Patient's FSGs are controlled on current medication regimen.  Last A1c reviewed-   Lab Results   Component Value Date    HGBA1C 10.2 (H) 05/03/2023     Most recent fingerstick glucose reviewed-   Recent Labs   Lab 07/04/23  1711 07/04/23  2154 07/05/23  0744 07/05/23  1202   POCTGLUCOSE 168* 128* 135* 142*     Current  correctional scale  Medium  Maintain anti-hyperglycemic dose as follows-   Antihyperglycemics (From admission, onward)    Start     Stop Route Frequency Ordered    07/04/23 2100  insulin detemir U-100 (Levemir) pen 40 Units         -- SubQ Nightly 07/03/23 2321    07/04/23 1817  insulin aspart U-100 pen 1-10 Units         -- SubQ Before meals & nightly PRN 07/04/23 1717        Hold Oral hypoglycemics while patient is in the hospital.    Mixed hyperlipidemia   Patient is chronically on statin.will continue for now. Monitor clinically. Last LDL was   Lab Results   Component Value Date    LDLCALC 65.4 05/02/2023              VTE Risk Mitigation (From admission, onward)         Ordered     IP VTE HIGH RISK PATIENT  Once         07/03/23 2321     Place sequential compression device  Until discontinued         07/03/23 2321                Discharge Planning   ALEX: 7/6/2023     Code Status: Full Code   Is the patient medically ready for discharge?:     Reason for patient still in hospital (select all that apply): Treatment  Discharge Plan A: Rehab                  Darryl Riggs MD  Department of Hospital Medicine   [facility]/Surg

## 2023-07-05 NOTE — ASSESSMENT & PLAN NOTE
Patient has a current diagnosis of Hypertensive emergency with end organ damage evidenced by acute ischemic stroke which is controlled.  Latest blood pressure and vitals reviewed-   Temp:  [97.1 °F (36.2 °C)-97.7 °F (36.5 °C)]   Pulse:  [49-88]   Resp:  [12-20]   BP: (132-160)/(60-73)   SpO2:  [98 %-100 %] .   Patient currently on IV antihypertensives.   Home meds for hypertension were reviewed.    Medication adjustment for hospital antihypertensives is as follows- IV labetolol PRN increased clonidine to TID, consider increasing strength. cardene gtt if resistant     Will aim for controlled BP reduction by medications noted above. Monitor and mitigate end organ damage as indicated.

## 2023-07-05 NOTE — PLAN OF CARE
Problem: SLP  Goal: SLP Goal  Description: 1. Pt will tolerate LRD (IDDSI 7, thin) w/ adequate oral prep, clearance, and no overt s/s aspiration during >95% of PO intake  2. Pt will follow simple 1 step directives given mod cuing w/ 75% accuracy  3. Pt will utilize clear speech strategies during speech drills w/ functional words and phrases w/ 75% accuracy given mod cuing  Outcome: Ongoing, Progressing     CSE and cognitive-communication evaluation completed. Pt presents w/ worsened dysarthria and cognitive-linguistic status. Moderate oral dysphagia noted. Rec IDDSI 5- minced and moist diet w/ thin liquids and use of swallowing precautions. Rec skilled ST during admit and upon discharge.

## 2023-07-05 NOTE — PT/OT/SLP PROGRESS
Physical Therapy Treatment    Patient Name:  Steff Johnson   MRN:  [id number]    Recommendations:     Discharge Recommendations: rehabilitation facility  Discharge Equipment Recommendations: none  Barriers to discharge: None    Assessment:     Steff Johnson is a 63 y.o. female admitted with a medical diagnosis of CVA (cerebral vascular accident).  She presents with the following impairments/functional limitations: weakness, impaired endurance, impaired self care skills, impaired functional mobility, gait instability, impaired balance, decreased safety awareness, decreased lower extremity function, impaired cognition, decreased coordination, impaired cardiopulmonary response to activity .    Pt seen supine in bed, alert and able to express needs. Pt agreeable to get OOB. Pt swinging LLE off bed in an effort to assist with mobility. Pt sat EOB mod assist. Completed thera ex with babs LOBATO. Presents with R paresis but assisting with exercises. Stood with min/mod assist x2 for safety with extra time for midline. Assist maintaining R hand on RW. Pt able to take 3 steps to chair with extra time and cueing for posture. OOB to chair and repositioned.  Pt will benefit from rehab.    Rehab Prognosis: Fair; patient would benefit from acute skilled PT services to address these deficits and reach maximum level of function.    Recent Surgery: * No surgery found *      Plan:     During this hospitalization, patient to be seen daily to address the identified rehab impairments via therapeutic activities, gait training, therapeutic exercises, neuromuscular re-education and progress toward the following goals:    Plan of Care Expires:  07/31/23    Subjective   Spouse at bedside encouraging pt  Chief Complaint: weakness R side  Patient/Family Comments/goals: get well  Pain/Comfort:  Pain Rating 1: 0/10      Objective:     Communicated with nurse Whitfield prior to session.  Patient found HOB elevated with telemetryAngela upon PT  entry to room.     General Precautions: Standard, fall, aphasia, aspiration  Orthopedic Precautions: N/A  Braces: N/A  Respiratory Status: Room air     Functional Mobility:  Bed Mobility:     Rolling Left:  minimum assistance  Scooting: moderate assistance  Supine to Sit: moderate assistance  Transfers:     Sit to Stand:  minimum assistance, moderate assistance, and of 2 persons with rolling walker  Bed to Chair: minimum assistance, moderate assistance, and of 2 persons with  rolling walker  using  Stand Pivot  Gait: 3steps with RW with extra time and cueing for posture and leg advancement. Pt is able to advance leg      AM-PAC 6 CLICK MOBILITY  Turning over in bed (including adjusting bedclothes, sheets and blankets)?: 3  Sitting down on and standing up from a chair with arms (e.g., wheelchair, bedside commode, etc.): 2  Moving from lying on back to sitting on the side of the bed?: 2  Moving to and from a bed to a chair (including a wheelchair)?: 2  Need to walk in hospital room?: 2  Climbing 3-5 steps with a railing?: 1  Basic Mobility Total Score: 12       Treatment & Education:  Patient was educated on the importance of OOB activity and functional mobility to negate negative effects of prolonged bed rest during hospitalization, safe transfers and ambulation, and D/C planning   Thera ex and OOB to chair  Pt alert/interactive with good participation  Rehab appropriate    Patient left up in chair with all lines intact, call button in reach, and chair alarm on..    GOALS:   Multidisciplinary Problems       Physical Therapy Goals          Problem: Physical Therapy    Goal Priority Disciplines Outcome Goal Variances Interventions   Physical Therapy Goal     PT, PT/OT Ongoing, Progressing     Description: Goals to be met by: 23     Patient will increase functional independence with mobility by performin. Supine to sit with Contact Guard Assistance  2. Sit to stand transfer with Contact Guard Assistance  3.  Bed to chair transfer with Contact Guard Assistance using Rolling Walker  4. Gait  x 150 feet with Contact Guard Assistance using Rolling Walker.   5. Increased functional strength to 4/5 for use in mobility and amb.                         Time Tracking:     PT Received On: 07/05/23  PT Start Time: 0956     PT Stop Time: 1015  PT Total Time (min): 19 min     Billable Minutes: Therapeutic Activity 19    Treatment Type: Treatment  PT/PTA: PT     Number of PTA visits since last PT visit: 0     07/05/2023

## 2023-07-05 NOTE — PLAN OF CARE
Cecelia with [facility] meeting at bedside with pt, will be submitting for auth today.     07/05/23 1055   Post-Acute Status   Post-Acute Authorization Placement   Post-Acute Placement Status Pending payor review/awaiting authorization (if required)

## 2023-07-05 NOTE — SUBJECTIVE & OBJECTIVE
Interval History:  Patient seen and examined.  No acute events.  Remains aphasic.  Plan of care reviewed with the patient and the nurse at the bedside in detail.      Objective:     Vital Signs (Most Recent):  Temp: 97.1 °F (36.2 °C) (07/05/23 1342)  Pulse: (!) 52 (07/05/23 1342)  Resp: 16 (07/05/23 1342)  BP: (!) 152/70 (07/05/23 1342)  SpO2: 99 % (07/05/23 1342) Vital Signs (24h Range):  Temp:  [97.1 °F (36.2 °C)-97.7 °F (36.5 °C)] 97.1 °F (36.2 °C)  Pulse:  [49-88] 52  Resp:  [12-20] 16  SpO2:  [98 %-100 %] 99 %  BP: (132-160)/(60-73) 152/70     Weight: 59 kg (130 lb)  Body mass index is 26.26 kg/m².    Intake/Output Summary (Last 24 hours) at 7/5/2023 1531  Last data filed at 7/5/2023 0640  Gross per 24 hour   Intake 118 ml   Output 150 ml   Net -32 ml           Physical Exam  Vitals and nursing note reviewed.   Constitutional:       General: She is not in acute distress.  Eyes:      Pupils: Pupils are equal, round, and reactive to light.   Cardiovascular:      Rate and Rhythm: Normal rate and regular rhythm.      Heart sounds: No murmur heard.  Pulmonary:      Effort: Pulmonary effort is normal.      Breath sounds: Normal breath sounds.   Abdominal:      General: There is no distension.      Palpations: Abdomen is soft.      Tenderness: There is no abdominal tenderness.   Skin:     Coloration: Skin is not pale.      Findings: No rash.   Neurological:      Mental Status: She is alert and oriented to person, place, and time.      Motor: Weakness present.      Coordination: Coordination abnormal.      Gait: Gait abnormal.      Deep Tendon Reflexes: Reflexes abnormal.      Comments: Aphasia noted           Significant Labs: All pertinent labs within the past 24 hours have been reviewed.    Significant Imaging: I have reviewed all pertinent imaging results/findings within the past 24 hours.

## 2023-07-05 NOTE — HOSPITAL COURSE
Patient was admitted to the hospital with new onset CVA.  He was started on standard stroke protocol.  MRI revealed possible microhemorrhage, the patient was monitored closely in the hospital for any extension of such.  She was assessed by PT, OT, and speech language pathology.  Patient was also assessed by Neurology, and started on anti-platelet and high-dose intensity statin.  Patient's blood pressure was kept well controlled less than 180/110 given her history of microhemorrhages.  Patient's insulin was also adjusted secondary to hyperglycemia.  She was set up for discharge to inpatient rehab for further ongoing therapy.

## 2023-07-05 NOTE — PLAN OF CARE
Problem: Physical Therapy  Goal: Physical Therapy Goal  Description: Goals to be met by: 23     Patient will increase functional independence with mobility by performin. Supine to sit with Contact Guard Assistance  2. Sit to stand transfer with Contact Guard Assistance  3. Bed to chair transfer with Contact Guard Assistance using Rolling Walker  4. Gait  x 150 feet with Contact Guard Assistance using Rolling Walker.   5. Increased functional strength to 4/5 for use in mobility and amb.    Outcome: Ongoing, Progressing   Pt alert and motivated. Pt with R paresis. Pt requiring mod assist to sit EOB. Pt able to take 3 steps with RW and OOB chair. rehab

## 2023-07-05 NOTE — PROGRESS NOTES
"[facility]  Neurology Progress Note    Patient Name: Steff Johnson   MRN: [id number]  : 1960  TODAY'S DATE: 2023  ADMIT DATE: 7/3/2023  2:59 PM                                          CONSULTED PROVIDER: Jennifer Tony MD, Neurologist. On-call Phone: [phone]  CONSULT REQUESTED BY: Darryl Riggs MD     Chief Complaint   Patient presents with    Weakness     Multiple cva's / slurred speech and weakness started over weekend         HPI per EMR:  Ms. 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 It is moderate. It is associated with slurred speech and increased fatigue. She denies CP or SOB. She is a poor historian and  is at bedside who supplements and denies facial asymmetry. He states symptoms began 2-3 days ago. In the ED, CT head was negative for acute abnormality, but MRI revealed: "1. Single small acute infarction in the right frontal white matter. 2. Numerous remote lacunar infarctions as above. 3. Advanced chronic microvascular ischemic changes. 4. Numerous parenchymal microhemorrhages as above which may be related to hypertensive encephalopathy or amyloidosis." This was personally discussed with Dr. Chino Melvin who recommends SBP goal of 180 and DAPT and admission. Hospital medicine is consulted for admission.     Neurology Consult:  Patient was seen and examined by me today. She is a 63-year-old woman with prior history of multiple strokes with residual right sided weakness who presented from physical therapy with worsening generalized weakness and dysarthria. She had been working with the therapist who noticed patient was worse than prior days, so they were counseled to go to the ED for evaluation. MRI brain was obtained and showed a small right frontal acute ischemic stroke and re demonstrated multiple hypertensive " microhemorrhages, so she was admitted for further workup and management.     7/5/23: patient was seen and examined by me today. She denies any new neuro deficits. No acute events overnight were reported by nursing.       Scheduled Meds:   amLODIPine  10 mg Oral Daily    aspirin  81 mg Oral Daily    atorvastatin  80 mg Oral QHS    cloNIDine  0.1 mg Oral TID    clopidogreL  75 mg Oral Daily    hydrALAZINE  100 mg Oral TID    insulin detemir U-100 (Levemir)  40 Units Subcutaneous QHS    latanoprost  1 drop Both Eyes Daily    losartan  100 mg Oral Daily    metoprolol succinate  100 mg Oral Daily    senna-docusate 8.6-50 mg  1 tablet Oral BID    tiotropium bromide  2 puff Inhalation Daily     Continuous Infusions:   sodium chloride 0.9%       PRN Meds:.dextrose 10%, dextrose 10%, glucagon (human recombinant), glucose, glucose, insulin aspart U-100, labetaloL, ondansetron, polyethylene glycol, sodium chloride 0.9%, sodium chloride 0.9%      Physical Exam  Current Vitals:  Vitals:    07/05/23 0721   BP: (!) 155/70   Pulse: (!) 49   Resp: 14   Temp: 97.7 °F (36.5 °C)       Physical Exam:  General: AO x4  HEENT: PERRL, EOMI  CV: RRR  Lungs: no respiratory distress  Abdomen: soft, non tender    Neurological Exam  MENTAL STATUS: Patient awake and oriented to place, and person. Affect normal.  CRANIAL NERVES II-XII: Pupils equal, round and reactive to light. Extraocular movements full and intact.  Right facial asymmetry.  Speech is dysarthric, patient only verbalizing a few words at a time  MOTOR: Normal bulk. Tone normal and symmetrical throughout.  No abnormal movements. No tremor.   Strength 4/5 in right upper and lower extremities, 5/5 on left upper and 4+/5 left lower extremities.  REFLEXES: DTRs 1+; normal and symmetric throughout.   SENSATION: Sensation grossly intact to fine touch.  COORDINATION: Finger-to-nose normal for age and symmetric.  STATION: Romberg deferred.  GAIT: Deferred.    NIH Stroke Scale      Date:  2023  Time: 1530  Person Administering Scale: Jennifer Tony MD    Administer stroke scale items in the order listed. Record performance in each category after each subscale exam. Do not go back and change scores. Follow directions provided for each exam technique. Scores should reflect what the patient does, not what the clinician thinks the patient can do. The clinician should record answers while administering the exam and work quickly. Except where indicated, the patient should not be coached (i.e., repeated requests to patient to make a special effort).      1a  Level of consciousness: 0=alert; keenly responsive   1b. LOC questions:  1=Answers one task correctly   1c. LOC commands: 0=Answers both tasks correctly   2.  Best Gaze: 0=normal   3.  Visual: 0=No visual loss   4. Facial Palsy: 1=Minor paralysis (flattened nasolabial fold, asymmetric on smiling)   5a.  Motor left arm: 0=No drift, limb holds 90 (or 45) degrees for full 10 seconds   5b.  Motor right arm: 1=Drift, limb holds 90 (or 45) degrees but drifts down before full 10 seconds: does not hit bed   6a. motor left le=No drift, limb holds 90 (or 45) degrees for full 10 seconds   6b  Motor right le=Drift, limb holds 90 (or 45) degrees but drifts down before full 10 seconds: does not hit bed   7. Limb Ataxia: 0=Absent   8.  Sensory: 0=Normal; no sensory loss   9. Best Language:  1=Mild to moderate aphasia; some obvious loss of fluency or facility of comprehension without significant limitation on ideas expressed or form of expression.   10. Dysarthria: 1=Mild to moderate, patient slurs at least some words and at worst, can be understood with some difficulty   11. Extinction and Inattention: 0=No abnormality    Total:   6       Laboratory Data & Studies    Recent Labs   Lab 23  1611 23  0435 23  0434   WBC 9.46 9.73 7.63   HGB 11.9* 10.1* 10.1*    378 388   MCV 81* 80* 80*         Recent Labs   Lab 23  1611  07/04/23  0435 07/05/23 0434    142 141   K 4.8 3.9 4.2   * 114* 111*   CO2 18* 20* 22*   BUN 27* 24* 24*   * 110 129*   CALCIUM 9.5 8.8 8.8   MG  --  2.0  --    PHOS  --  3.4  --          Recent Labs   Lab 07/03/23  1611 07/04/23 0435 07/05/23 0434   PROT 8.3 6.8 6.8   ALBUMIN 3.9 3.2* 3.2*   BILITOT 0.4 0.4 0.5   AST 25 17 17   ALT 34 26 26   ALKPHOS 79 73 75         Recent Labs   Lab 07/04/23 0435   INR 1.0   APTT 29.6         No results for input(s): HGBA1C, CHOL, TRIG, LDLCALC, HDL, TSH in the last 168 hours.    Microbiology:  Microbiology Results (last 7 days)       ** No results found for the last 168 hours. **            Imaging:  CT Head Without Contrast    Result Date: 7/3/2023  EXAMINATION: CT HEAD WITHOUT CONTRAST CLINICAL HISTORY: Neuro deficit, acute, stroke suspected; TECHNIQUE: Low dose axial images were obtained through the head.  Coronal and sagittal reformations were also performed. Contrast was not administered. COMPARISON: 05/02/2023 FINDINGS: there is mild dilatation of ventricles, sulci, fissures. There is decreased density in white matter suggesting microvascular ischemic change appearing advanced and with old lacunar infarcts in the basal ganglia bilaterally and left thalamus and the regina. No acute intracranial hemorrhage. No intracranial mass effect. No acute major vascular territory infarct. Note is made that MRI is typically more sensitive than CT particular for detection of early or small nonhemorrhagic infarct. The calvarium appears intact, mastoids well pneumatized, visualized paranasal sinuses essentially clear.     No acute intracranial findings.  Mild involutional change and findings suggesting microvascular ischemic change and old lacunar infarcts. Electronically signed by: Rebeca Schmidt MD Date:    07/03/2023 Time:    16:33    MRI Brain Without Contrast    Result Date: 7/3/2023  EXAMINATION: MRI BRAIN WITHOUT CONTRAST CLINICAL HISTORY: Stroke, follow up;  TECHNIQUE: Multiplanar multisequence MR imaging of the brain was performed without intravenous contrast. COMPARISON: MRI brain from 05/02/2023.  CT head from 07/03/2023 taken at 15:52. FINDINGS: There is a single small focus of restricted diffusion in the right frontal lobe, just anterior to the frontal horn of the lateral ventricle.  No additional foci of restricted diffusion are seen.  There are multiple remote lacunar infarctions involving the regina, the bilateral basal ganglia and thalami, and the bilateral cerebellar hemispheres.  There is confluent T2/FLAIR hyperintense signal in the supratentorial white matter compatible with chronic microvascular ischemic changes.  There are numerous foci of susceptibility/parenchymal microhemorrhages in the bilateral cerebral hemispheres, the brainstem, and the right cerebellar hemisphere.  Ventricles are normal in size for age without evidence of hydrocephalus. No mass, hemorrhage, or recent or remote major vascular distribution infarct.  No extra-axial blood or fluid collections. Normal vascular flow voids. Bone marrow signal intensity is normal. Paranasal sinuses and mastoid air cells are clear.     1. Single small acute infarction in the right frontal white matter. 2. Numerous remote lacunar infarctions as above. 3. Advanced chronic microvascular ischemic changes. 4. Numerous parenchymal microhemorrhages as above which may be related to hypertensive encephalopathy or amyloidosis. This report was flagged in Epic as abnormal. Electronically signed by: Tariq Wallace Date:    07/03/2023 Time:    19:45    X-Ray Chest AP Portable    Result Date: 6/16/2023  EXAMINATION: XR CHEST AP PORTABLE CLINICAL INDICATION: Female, 63 years old. Hypoglycemia COMPARISON: May 2, 2023 FINDINGS: An antegrade vessels appear normal. There is no evidence of mediastinal or hilar mass. Trachea is midline. Hypoaeration of the lungs again noted. Otherwise the lungs are clear. No pleural effusion can  be seen. No pneumothorax can be seen. IMPRESSION: Hypoaeration of the lungs. There is no x-ray evidence of active or acute disease and no change as compared to May 2, 2023 Electronically signed by:  Davis Cristina Jr., MD  6/16/2023 6:51 AM CDT Workstation: [id number]      Assessment:    Acute ischemic stroke in right frontal lobe - etiology ongoing  63-year-old woman with prior history of multiple strokes with residual right sided weakness who presented from physical therapy with worsening generalized weakness and dysarthria. MRI brain was obtained and showed a small right frontal acute ischemic stroke and re demonstrated multiple hypertensive microhemorrhages, so she was admitted for further workup and management. Of note, prior admissions for stroke she has had workup which has been unrevealing. CTA head and neck showed no vessel disease, she has had prior GT that showed no ZENAIDA thrombus and holter monitor which revealed no arrhythmia. She had been taking ASA 81 mg daily only in the setting of microhemorrhages, but is now presenting with another ischemic event, so starting DAPT after discussion of risks vs benefits.    Stroke workup:  CTH: no acute intracranial abnormality  CTA head and neck: no LVO or significant stenosis  MRI brain: small right frontal acute ischemic stroke  Risk factors: A1C, TSH, LDL  Transesophageal echo 5/3/23: EF 60%, normal ZENAIDA with no thrombus and normal velocities, grade 1 plaque in transverse and descending aorta, no PFO, no left atrial dilation  Hypercoagulable workup: ordered and pending    Plan:  - Admitted to hospital medicine with q4 hour neuro checks, on telemetry, continuous pulse oximetry  - Diet after eval per SLP.  - Secondary stroke prevention with ASA 81 mg daily, plavix 75 mg daily, atorvastatin 80 mg daily. Continue DAPT for 21 days and then discontinue plavix and continue with ASA and atorvastatin  - Ordered hypercoagulable panel to rule out other causes for recurrent  stroke, will follow results in clinic since these will take more than a few days  - Risk factor stratification with HgbA1C, Fasting Lipid profile, TSH  - TE echocardiogram as above  - Maintain Euthermia with Tylenol prn temp > 37.2 degrees C.  - Assessment for rehab with PT/OT/SLP evaluation and treatment.  - BP Goal <180/105  - Cardiac enzymes and EKG   - Recommend home health for medication management to make sure patient is taking her medications as prescribed  - Will follow peripherally. Please call with questions, concerns or neurological decline.      DVT prophylaxis with chemo/SCD prophylaxis      Patient to follow up with [facility] at [phone] within 7 days from discharge.     Stroke education was provided including stroke risk factors modification and any acute neurological changes including weakness, confusion, visual changes to come straight to the ER.     All questions were answered.                              Thank you kindly for including us in the care of this patient. Please do not hesitate to contact us with any questions.      38 minutes of care time has been spent evaluating with the patient. Time includes chart review not limited to diagnostic imaging, labs, and vitals, patient assessment, discussion with family and nursing, current order evaluations, and new order entries.      Jennifer Tony MD  Neurology

## 2023-07-05 NOTE — CONSULTS
Food & Nutrition                                                           Education     Diet Education: Cardiac, diabetic diet  Time Spent: 15 minutes  Learners: Patient and         Nutrition Education provided with handouts: yes        Comments: Patient admitted with possible stroke. PMH HTN, HLD, DM2, COVID-19, CVA, hemiparesis, aphasia. Pt getting cleaned by PCT, able to speak with her . He does the cooking and shopping at home and tried to cook a healthy diet : ex: bakes or broils chicken often and gives her multiple servings of fruits and vegetables. He does not cook with salt. Sometimes he will order take out like Chinese, but limit her portions of rice. I educated pt on reading labels for fat and sodium and tips for eating out/choosing take out. Discussed continuing not to cabrera foods and using oils for cooking and other salt free seasonings.     Pt also with elevated A1C 10.9, though he tells me he gives her both basal and bolus insulin and tries to limit her portions of carbs. Delving further they are not always carb counting specifically, and he skips meal time insulin boluses when her pre-meal blood sugar is 150 mg/dl or below. I encouraged her  to give her the meal time insulin to cover the carbs she is about to eat even when the pre-meal blood sugar value is WDL and to continue to give the basal dose everyday as he has been. He is agreeable and we discussed carb counting and the plate method. Teachback method utilized successfully.      Assessment: NFPE-visual 7/4/23 WDL. Good appetite PTA per .  All questions and concerns answered. Dietitian's contact information provided.         Follow-Up: yes     Please Re-consult as needed           Thanks!

## 2023-07-05 NOTE — PLAN OF CARE
[facility] - Med/Surg  Initial Discharge Assessment       Primary Care Provider: Cristina Ren MD    Admission Diagnosis: Cerebrovascular accident (CVA), unspecified mechanism [I63.9]    Admission Date: 7/3/2023  Expected Discharge Date: 7/6/2023    Transition of Care Barriers: None    Payor: HUMA[employer] MANAGED MEDICARE / Plan: HU[employer] SNP HMO PPO SPECIAL NEEDS / Product Type: Medicare Advantage /     Extended Emergency Contact Information  Primary Emergency Contact: Nish Marti  Home Phone: [phone]  Mobile Phone: [phone]  Relation: Friend   needed? No  Secondary Emergency Contact: Gita Johnson  Address: [address] [place], [place] [id number] [place] [address]  Home Phone: [phone]  Mobile Phone: [phone]  Relation: Sister  Preferred language: English   needed? No    Discharge Plan A: Rehab  Discharge Plan B: Skilled Nursing Facility      [facility] - [facility] [address]  [address] [facility] [address]  Phone: [phone] Fax: [phone]    [facility] [address]  [facility] [address]  Phone: [phone] Fax: [phone]    [id number] - [place], [place] - [address]  [address] [facility]  [address] [facility] [facility] [address]  Phone: [phone] Fax: [phone]      Initial Assessment (most recent)       Adult Discharge Assessment - 07/05/23 1055          Discharge Assessment    Assessment Type Discharge Planning Assessment     Confirmed/corrected address, phone number and insurance Yes     Confirmed Demographics Correct on Facesheet     Source of Information patient;family     Communicated ALEX with patient/caregiver Yes     People in Home significant other     Facility Arrived From: home     Do you expect to return to your current living situation? Other (see comments)   rehab    Do you have help at home or someone to help you manage  your care at home? No     Prior to hospitilization cognitive status: Unable to Assess     Current cognitive status: Alert/Oriented     Equipment Currently Used at Home bedside commode;cane, straight;rollator;walker, rolling;wheelchair     Readmission within 30 days? Yes     Patient currently being followed by outpatient case management? No     Do you currently have service(s) that help you manage your care at home? No     Do you take prescription medications? Yes     Do you have prescription coverage? Yes     Do you have any problems affording any of your prescribed medications? No     Is the patient taking medications as prescribed? yes     How do you get to doctors appointments? family or friend will provide     Are you on dialysis? No     Do you take coumadin? No     Discharge Plan A Rehab     Discharge Plan B Skilled Nursing Facility     DME Needed Upon Discharge  none     Discharge Plan discussed with: Patient     Transition of Care Barriers None

## 2023-07-05 NOTE — PT/OT/SLP EVAL
Speech Language Pathology Evaluation  Cognitive/Bedside Swallow    Patient Name:  Steff Johnson   MRN:  [id number]  Admitting Diagnosis: CVA (cerebral vascular accident)    Recommendations:                  General Recommendations:  Dysphagia therapy, Speech/language therapy, and Cognitive-linguistic therapy  Diet recommendations:  Minced & Moist Diet - IDDSI Level 5, Thin liquids - IDDSI Level 0   Aspiration Precautions: 1 bite/sip at a time, Alternating bites/sips, Assistance with meals, Check for pocketing/oral residue, Eliminate distractions, Feed only when awake/alert, HOB to 90 degrees, Meds crushed in puree, and Small bites/sips   General Precautions: Standard, aphasia, aspiration  Communication strategies:  provide increased time to answer and go to room if call light pushed    Assessment:     Steff Johnson is a 63 y.o. female with an SLP diagnosis of Dysphagia, Dysarthria, Cognitive-Linguistic Impairment, and Dysphonia.  She presents with impaired oral prep and transport, imprecise articulation and impaired resonance, poor working memory and attention, receptive deficits, and inconsistent periods of aphonia. Pt was previously diagnosed w/ moderate-severe cognitive-linguistic deficits, anomic aphasia, dysphonia, and dysarthria. Changes in severity of dysarthria and new onset of dysphagia and receptive language deficits noted upon completion of today's evaluation. Rec pt initiate IDDSI 5- minced/moist diet w/ strict use of swallowing precautions listed above. Rec speech-language and cognitive tx during admit and at discharge.    History:     Past Medical History:   Diagnosis Date    Arthritis     Complete tear of left rotator cuff 11/09/2017    COPD (chronic obstructive pulmonary disease)     COVID-19 infection 07/02/2021    Diabetes mellitus     H/o Cerebrovascular accident (CVA) of left pontine structure 12/12/2019    Hypertension     Personal history of colonic polyps     Stroke     Wears glasses         Past Surgical History:   Procedure Laterality Date    COLONOSCOPY N/A 4/11/2017    Procedure: COLONOSCOPY;  Surgeon: Jared Antonio MD;  Location: [facility];  Service: Endoscopy;  Laterality: N/A;    HYSTERECTOMY      OOPHORECTOMY      SHOULDER SURGERY      R    TRANSESOPHAGEAL ECHOCARDIOGRAM WITH POSSIBLE CARDIOVERSION (GT W/ POSS CARDIOVERSION) N/A 5/4/2023    Procedure: Transesophageal echo (GT) intra-procedure log documentation;  Surgeon: Blade Phillip MD;  Location: [facility] CATH/EP LAB;  Service: Cardiology;  Laterality: N/A;       Social History: Patient lives with her  at home.    Prior Intubation HX:  none this admit    Modified Barium Swallow: none found in epic or reported by pt      Chest X-Rays:   Imaging Results               MRI Brain Without Contrast (Final result)  Result time 07/03/23 19:45:10      Final result by Tariq Wallace DO (07/03/23 19:45:10)                   Impression:      1. Single small acute infarction in the right frontal white matter.  2. Numerous remote lacunar infarctions as above.  3. Advanced chronic microvascular ischemic changes.  4. Numerous parenchymal microhemorrhages as above which may be related to hypertensive encephalopathy or amyloidosis.  This report was flagged in Epic as abnormal.      Electronically signed by: Tariq Wallace  Date:    07/03/2023  Time:    19:45               Narrative:    EXAMINATION:  MRI BRAIN WITHOUT CONTRAST    CLINICAL HISTORY:  Stroke, follow up;    TECHNIQUE:  Multiplanar multisequence MR imaging of the brain was performed without intravenous contrast.    COMPARISON:  MRI brain from 05/02/2023.  CT head from 07/03/2023 taken at 15:52.    FINDINGS:  There is a single small focus of restricted diffusion in the right frontal lobe, just anterior to the frontal horn of the lateral ventricle.  No additional foci of restricted diffusion are seen.  There are multiple remote lacunar infarctions involving the regina, the bilateral  basal ganglia and thalami, and the bilateral cerebellar hemispheres.  There is confluent T2/FLAIR hyperintense signal in the supratentorial white matter compatible with chronic microvascular ischemic changes.  There are numerous foci of susceptibility/parenchymal microhemorrhages in the bilateral cerebral hemispheres, the brainstem, and the right cerebellar hemisphere.  Ventricles are normal in size for age without evidence of hydrocephalus. No mass, hemorrhage, or recent or remote major vascular distribution infarct.  No extra-axial blood or fluid collections. Normal vascular flow voids.    Bone marrow signal intensity is normal. Paranasal sinuses and mastoid air cells are clear.                                       CT Head Without Contrast (Final result)  Result time 07/03/23 16:33:18      Final result by Rebeca Schmidt MD (07/03/23 16:33:18)                   Impression:      No acute intracranial findings.  Mild involutional change and findings suggesting microvascular ischemic change and old lacunar infarcts.      Electronically signed by: Rebeca Schmidt MD  Date:    07/03/2023  Time:    16:33               Narrative:    EXAMINATION:  CT HEAD WITHOUT CONTRAST    CLINICAL HISTORY:  Neuro deficit, acute, stroke suspected;    TECHNIQUE:  Low dose axial images were obtained through the head.  Coronal and sagittal reformations were also performed. Contrast was not administered.    COMPARISON:  05/02/2023    FINDINGS:  there is mild dilatation of ventricles, sulci, fissures. There is decreased density in white matter suggesting microvascular ischemic change appearing advanced and with old lacunar infarcts in the basal ganglia bilaterally and left thalamus and the regina. No acute intracranial hemorrhage. No intracranial mass effect. No acute major vascular territory infarct. Note is made that MRI is typically more sensitive than CT particular for detection of early or small nonhemorrhagic infarct. The calvarium  appears intact, mastoids well pneumatized, visualized paranasal sinuses essentially clear.                                        Prior diet: regular, thin.    Occupation/hobbies/homemaking: none stated.    Subjective     Pt awake in bed w/ eyes closed and spouse at bedside. Pt agreeable to ST eval.  Patient goals: none stated     Pain/Comfort:       Respiratory Status: Room air    Objective:     Cognitive Status:      MoCA (Version 8.1) administered with pt achieving a score of 10/30 suggesting severe cognitive-linguistic impairment. Pt earned 0 of 5 points on visuospatial/executive functions (hx of poor vision), 3 of 3 points on naming, 1 of 6 points for attention, 2 of 3 points for language, 0 of 2 points for abstraction, 1 of 5 points for delayed recall and 3 of 6 points for orientation. 1 point added to total score for education level. Previous admit (May 2023), pt received a score of 12/30; prior to that, pt received a score of 9/30 (April 2023). Pt's score reflecting old baseline after CVA in April. Scores overall reflecting minimal changes in cognition.        Receptive Language:   Comprehension:      Questions Simple yes/no limited assessment; observed to be WFL  Open ended questions impaired  Commands  One step WFL  two step basic commands impaired  complex/abstract commands impaired    Pragmatics:    Flat affect    Expressive Language:  Verbal:    Initiation pt did not initiate conversation  Repetition Words WFL and Sentences WFL  Naming Confrontation WFL, Convergent impaired, and Divergent responsive impaired        Motor Speech:  Dysarthria imprecise articulation, inadequate breath support for extended utterances, hypernasality (minimal velar movement), low volume when speaking  Apraxia suspected due to occasional groping and decreased lingual coordination; however, further assessment indicated    Voice:   Dysphonia; periods of aphonia noted when speaking    Visual-Spatial:  Impaired; pt appeared to be  neglecting L side visual field    Reading:   Unable to assess        Written Expression:   Unable to assess due to decreased attention to L visual field when presented w/ pen, decreased motor movement to reach for and hold pen for writing, and decreased understanding of prompted task    Oral Musculature Evaluation  Oral Musculature: general weakness, facial asymmetry present (R side)  Dentition: present and adequate  Secretion Management: adequate  Mucosal Quality: adequate  Mandibular Strength and Mobility: other (see comments) (slowed movement)  Oral Labial Strength and Mobility: impaired retraction, impaired pursing, impaired seal  Lingual Strength and Mobility: impaired strength, impaired left lateral movement, other (see comments) (deviation to L; impaired coordination)  Velar Elevation: impaired (decreased/intermittent elevation)  Buccal Strength and Mobility: other (see comments) (decreased tone/movement R side)  Volitional Cough: weak  Volitional Swallow: delayed initation; adequate laryngeal elevation/excursion noted via palpation  Voice Prior to PO Intake: clear, hypernasal, periods of aphonia    Bedside Swallow Eval:   Consistencies Assessed:  Thin liquids water via cup edge and straw  Puree tsp bites applesauce  Mixed consistencies tsp bites diced peaches in thin juice  Solids dry cracker      Oral Phase:   Anterior loss (tongue thrust w/ large volume water sips via straw)  Excess chewing w/ mixed and solids  Prolonged mastication w/ mixed and solids  Lingual residue (entire bolus or mod residue)  Slow oral transit time w/ mixed and solids    Pharyngeal Phase:   no overt clinical signs/symptoms of aspiration  no overt clinical signs/symptoms of pharyngeal dysphagia    Compensatory Strategies  Liquid wash- successful in clearing lingual residue    Treatment: Pt and family educated re purpose of evaluation, role of SLP, results of evaluation, and recs. Pt expressing understanding of recs; however,  unreliable due to level of impairment. Pt's spouse asleep upon completion of evaluation; recs not shared. Recs shared w/ nursing.      Goals:   Multidisciplinary Problems       SLP Goals          Problem: SLP    Goal Priority Disciplines Outcome   SLP Goal     SLP Ongoing, Progressing   Description: 1. Pt will tolerate LRD (IDDSI 7, thin) w/ adequate oral prep, clearance, and no overt s/s aspiration during >95% of PO intake  2. Pt will follow simple 1 step directives given mod cuing w/ 75% accuracy  3. Pt will utilize clear speech strategies during speech drills w/ functional words and phrases w/ 75% accuracy given mod cuing                       Plan:     Patient to be seen:  3 x/week, 4 x/week   Plan of Care expires:     Plan of Care reviewed with:  patient, spouse, other (see comments) (nursing)   SLP Follow-Up:  Yes       Discharge recommendations:  Discharge Facility/Level of Care Needs: nursing facility, skilled, rehabilitation facility   Barriers to Discharge:  None    Time Tracking:     SLP Treatment Date:   07/05/23  Speech Start Time:  0922  Speech Stop Time:  0957     Speech Total Time (min):  35 min    Billable Minutes: Eval speech, language, cognition: 20 min  and Eval Swallow and Oral Function 15 min    07/05/2023

## 2023-07-05 NOTE — ASSESSMENT & PLAN NOTE
Patient's FSGs are controlled on current medication regimen.  Last A1c reviewed-   Lab Results   Component Value Date    HGBA1C 10.2 (H) 05/03/2023     Most recent fingerstick glucose reviewed-   Recent Labs   Lab 07/04/23  1711 07/04/23  2154 07/05/23  0744 07/05/23  1202   POCTGLUCOSE 168* 128* 135* 142*     Current correctional scale  Medium  Maintain anti-hyperglycemic dose as follows-   Antihyperglycemics (From admission, onward)    Start     Stop Route Frequency Ordered    07/04/23 2100  insulin detemir U-100 (Levemir) pen 40 Units         -- SubQ Nightly 07/03/23 2321    07/04/23 1817  insulin aspart U-100 pen 1-10 Units         -- SubQ Before meals & nightly PRN 07/04/23 1717        Hold Oral hypoglycemics while patient is in the hospital.

## 2023-07-05 NOTE — PLAN OF CARE
Problem: Adult Inpatient Plan of Care  Goal: Plan of Care Review  Outcome: Ongoing, Progressing  Goal: Readiness for Transition of Care  Outcome: Ongoing, Progressing     Problem: Communication Impairment (Stroke, Ischemic/Transient Ischemic Attack)  Goal: Improved Communication Skills  Outcome: Ongoing, Progressing     Problem: Functional Ability Impaired (Stroke, Ischemic/Transient Ischemic Attack)  Goal: Optimal Functional Ability  Outcome: Ongoing, Progressing     Problem: Swallowing Impairment (Stroke, Ischemic/Transient Ischemic Attack)  Goal: Optimal Eating and Swallowing without Aspiration  Outcome: Ongoing, Progressing     Problem: Fall Injury Risk  Goal: Absence of Fall and Fall-Related Injury  Outcome: Ongoing, Progressing     Problem: Infection  Goal: Absence of Infection Signs and Symptoms  Outcome: Ongoing, Progressing     Problem: Diabetes Comorbidity  Goal: Blood Glucose Level Within Targeted Range  Outcome: Ongoing, Progressing     Problem: Skin Injury Risk Increased  Goal: Skin Health and Integrity  Outcome: Ongoing, Progressing

## 2023-07-05 NOTE — PT/OT/SLP PROGRESS
Occupational Therapy   Treatment    Name: Steff Johnson  MRN: [id number]  Admitting Diagnosis:  CVA (cerebral vascular accident)      Recommendations:     Discharge Recommendations: rehabilitation facility, outpatient OT, (IRF versus resumption of outpatient OT)  Discharge Equipment Recommendations:  none  Barriers to discharge:      Assessment:     Steff Johnson is a 63 y.o. female with a medical diagnosis of CVA (cerebral vascular accident).  She presents with performance deficits affecting function are weakness, impaired endurance, impaired self care skills, impaired functional mobility, gait instability, impaired balance, impaired cognition, decreased upper extremity function, decreased lower extremity function, decreased safety awareness, abnormal tone, decreased ROM, impaired coordination and impaired fine motor.     Rehab Prognosis:  Good; patient would benefit from acute skilled OT services to address these deficits and reach maximum level of function.       Plan:     Patient to be seen 5 x/week to address the above listed problems via self-care/home management, therapeutic activities, therapeutic exercises, neuromuscular re-education  Plan of Care Expires: 07/25/23  Plan of Care Reviewed with: patient    Subjective     Chief Complaint: None stated  Patient/Family Comments/goals: Patient agreed to participate in UE ROM exercises.  Pain/Comfort:  Pain Rating 1: 0/10    Objective:     Communicated with: nurse prior to session.  Patient found supine upon OT entry to room.    General Precautions: Standard, aphasia, aspiration, fall    Orthopedic Precautions: N/A  Braces: N/A  Respiratory Status: Room air       Treatment & Education:  Patient was given instruction and demonstration of R UE SROM exercises to improve her ROM. Patient performed 2x10 shoulder flexion, elbow extension/flexion and wrist extension/flexion with verbal cues for increased ROM. Patient tolerated ROM exercises well.     Patient left supine  with all lines intact, call button in reach and bed alarm on.    GOALS:   Multidisciplinary Problems       Occupational Therapy Goals          Problem: Occupational Therapy    Goal Priority Disciplines Outcome Interventions   Occupational Therapy Goal     OT, PT/OT     Description: Goals to be met by: 7/25/2023     Patient will increase functional independence with ADLs by performing:    Feeding with Stand-by Assistance.  UE Dressing with Minimal Assistance.  LE Dressing with Moderate Assistance.  Grooming while seated in chair/EOB with Contact Guard Assistance.  Toileting from bedside commode with Moderate Assistance for hygiene and clothing management.   Toilet transfer to bedside commode with Minimal Assistance.                         Time Tracking:     OT Date of Treatment: 07/05/23  OT Start Time: 1500  OT Stop Time: 1511  OT Total Time (min): 11 min    Billable Minutes:Therapeutic Exercise 11          Number of LIYAH visits since last OT visit: 0    7/5/2023

## 2023-07-06 VITALS
HEART RATE: 64 BPM | OXYGEN SATURATION: 97 % | BODY MASS INDEX: 26.21 KG/M2 | WEIGHT: 130 LBS | TEMPERATURE: 96 F | RESPIRATION RATE: 15 BRPM | HEIGHT: 59 IN | DIASTOLIC BLOOD PRESSURE: 67 MMHG | SYSTOLIC BLOOD PRESSURE: 149 MMHG

## 2023-07-06 LAB
APTT PPP: 32 SEC (ref 25–37)
AT III ACT/NOR PPP CHRO: 97 % (ref 80–130)
GLUCOSE SERPL-MCNC: 188 MG/DL (ref 70–110)
GLUCOSE SERPL-MCNC: 51 MG/DL (ref 70–110)
INR PPP: 1.1 (ref 0.9–1.1)
LA 3 SCREEN W REFLEX-IMP: NORMAL
POCT GLUCOSE: 138 MG/DL (ref 70–110)
POCT GLUCOSE: 174 MG/DL (ref 70–110)
POCT GLUCOSE: 195 MG/DL (ref 70–110)
POCT GLUCOSE: 35 MG/DL (ref 70–110)
POCT GLUCOSE: 36 MG/DL (ref 70–110)
POCT GLUCOSE: 54 MG/DL (ref 70–110)
POCT GLUCOSE: 63 MG/DL (ref 70–110)
POCT GLUCOSE: 72 MG/DL (ref 70–110)
PROTHROMBIN TIME: 12.4 SEC (ref 9.4–12.5)
SCREEN DRVVT/NORMAL: 1.13 RATIO

## 2023-07-06 PROCEDURE — 36415 COLL VENOUS BLD VENIPUNCTURE: CPT | Performed by: HOSPITALIST

## 2023-07-06 PROCEDURE — 25000003 PHARM REV CODE 250: Performed by: STUDENT IN AN ORGANIZED HEALTH CARE EDUCATION/TRAINING PROGRAM

## 2023-07-06 PROCEDURE — 94761 N-INVAS EAR/PLS OXIMETRY MLT: CPT

## 2023-07-06 PROCEDURE — 97530 THERAPEUTIC ACTIVITIES: CPT

## 2023-07-06 PROCEDURE — 25000003 PHARM REV CODE 250: Performed by: NURSE PRACTITIONER

## 2023-07-06 PROCEDURE — 94640 AIRWAY INHALATION TREATMENT: CPT

## 2023-07-06 PROCEDURE — 92526 ORAL FUNCTION THERAPY: CPT

## 2023-07-06 PROCEDURE — 25000003 PHARM REV CODE 250: Performed by: HOSPITALIST

## 2023-07-06 PROCEDURE — 82947 ASSAY GLUCOSE BLOOD QUANT: CPT | Mod: 91 | Performed by: HOSPITALIST

## 2023-07-06 PROCEDURE — 92507 TX SP LANG VOICE COMM INDIV: CPT

## 2023-07-06 PROCEDURE — 63600175 PHARM REV CODE 636 W HCPCS: Mod: JZ,JG | Performed by: HOSPITALIST

## 2023-07-06 PROCEDURE — 97110 THERAPEUTIC EXERCISES: CPT

## 2023-07-06 RX ORDER — INSULIN ASPART 100 [IU]/ML
5 INJECTION, SOLUTION INTRAVENOUS; SUBCUTANEOUS
Status: DISCONTINUED | OUTPATIENT
Start: 2023-07-06 | End: 2023-07-06 | Stop reason: HOSPADM

## 2023-07-06 RX ORDER — INSULIN ASPART 100 [IU]/ML
5 INJECTION, SOLUTION INTRAVENOUS; SUBCUTANEOUS 3 TIMES DAILY
Refills: 0 | Status: ON HOLD
Start: 2023-07-06 | End: 2023-08-16 | Stop reason: HOSPADM

## 2023-07-06 RX ORDER — MUPIROCIN 20 MG/G
OINTMENT TOPICAL 2 TIMES DAILY
Status: DISCONTINUED | OUTPATIENT
Start: 2023-07-06 | End: 2023-07-06 | Stop reason: HOSPADM

## 2023-07-06 RX ADMIN — CLONIDINE HYDROCHLORIDE 0.1 MG: 0.1 TABLET ORAL at 03:07

## 2023-07-06 RX ADMIN — AMLODIPINE BESYLATE 10 MG: 5 TABLET ORAL at 08:07

## 2023-07-06 RX ADMIN — HYDRALAZINE HYDROCHLORIDE 100 MG: 25 TABLET, FILM COATED ORAL at 03:07

## 2023-07-06 RX ADMIN — DOCUSATE SODIUM AND SENNOSIDES 1 TABLET: 8.6; 5 TABLET, FILM COATED ORAL at 08:07

## 2023-07-06 RX ADMIN — HYDRALAZINE HYDROCHLORIDE 100 MG: 25 TABLET, FILM COATED ORAL at 08:07

## 2023-07-06 RX ADMIN — GLUCAGON 1 MG: 1 INJECTION, POWDER, LYOPHILIZED, FOR SOLUTION INTRAMUSCULAR; INTRAVENOUS at 05:07

## 2023-07-06 RX ADMIN — DEXTROSE MONOHYDRATE 250 ML: 100 INJECTION, SOLUTION INTRAVENOUS at 05:07

## 2023-07-06 RX ADMIN — LATANOPROST 1 DROP: 50 SOLUTION OPHTHALMIC at 08:07

## 2023-07-06 RX ADMIN — CLOPIDOGREL BISULFATE 75 MG: 75 TABLET, FILM COATED ORAL at 08:07

## 2023-07-06 RX ADMIN — LOSARTAN POTASSIUM 100 MG: 100 TABLET, FILM COATED ORAL at 08:07

## 2023-07-06 RX ADMIN — TIOTROPIUM BROMIDE INHALATION SPRAY 2 PUFF: 3.12 SPRAY, METERED RESPIRATORY (INHALATION) at 07:07

## 2023-07-06 RX ADMIN — ASPIRIN 81 MG CHEWABLE TABLET 81 MG: 81 TABLET CHEWABLE at 08:07

## 2023-07-06 NOTE — CARE UPDATE
07/05/23 2024   Patient Assessment/Suction   Level of Consciousness (AVPU) alert   Respiratory Effort Normal;Unlabored   Rhythm/Pattern, Respiratory pattern regular   Cough Frequency no cough   PRE-TX-O2   Device (Oxygen Therapy) room air   SpO2 97 %   Pulse Oximetry Type Intermittent   $ Pulse Oximetry - Multiple Charge Pulse Oximetry - Multiple   Pulse (!) 58   Resp 16

## 2023-07-06 NOTE — PLAN OF CARE
Problem: Physical Therapy  Goal: Physical Therapy Goal  Description: Goals to be met by: 23     Patient will increase functional independence with mobility by performin. Supine to sit with Contact Guard Assistance  2. Sit to stand transfer with Contact Guard Assistance  3. Bed to chair transfer with Contact Guard Assistance using Rolling Walker  4. Gait  x 150 feet with Contact Guard Assistance using Rolling Walker.   5. Increased functional strength to 4/5 for use in mobility and amb.    Outcome: Ongoing, Progressing   Pt sleepy today. Pt requiring increased assist level for OOB to chair. Pt transferred to commode with good BM. Rehab appropriate

## 2023-07-06 NOTE — CARE UPDATE
07/06/23 0729   Patient Assessment/Suction   Level of Consciousness (AVPU) alert   Respiratory Effort Normal;Unlabored   Expansion/Accessory Muscles/Retractions no use of accessory muscles;no retractions;expansion symmetric   All Lung Fields Breath Sounds Anterior:;Lateral:;diminished   Rhythm/Pattern, Respiratory unlabored;pattern regular;depth regular;no shortness of breath reported   PRE-TX-O2   Device (Oxygen Therapy) room air   SpO2 98 %   Pulse Oximetry Type Intermittent   $ Pulse Oximetry - Multiple Charge Pulse Oximetry - Multiple   Pulse 60   Resp 16   Inhaler   $ Inhaler Charges MDI (Metered Dose Inahler) Treatment;Mouth rinsed post treatment   Respiratory Treatment Status (Inhaler) given;mouth rinsed post treatment   Treatment Route (Inhaler) mouthpiece;breath hold   Patient Position (Inhaler) HOB elevated   Post Treatment Assessment (Inhaler) breath sounds unchanged   Signs of Intolerance (Inhaler) none   Breath Sounds Post-Respiratory Treatment   Throughout All Fields Post-Treatment All Fields   Throughout All Fields Post-Treatment no change   Post-treatment Heart Rate (beats/min) 60   Post-treatment Resp Rate (breaths/min) 16

## 2023-07-06 NOTE — PT/OT/SLP PROGRESS
Physical Therapy Treatment    Patient Name:  Steff Johnson   MRN:  [id number]    Recommendations:     Discharge Recommendations: rehabilitation facility  Discharge Equipment Recommendations: none  Barriers to discharge: Decreased caregiver support    Assessment:     Steff Johnson is a 63 y.o. female admitted with a medical diagnosis of CVA (cerebral vascular accident).  She presents with the following impairments/functional limitations: weakness, impaired endurance, impaired self care skills, impaired functional mobility, gait instability, impaired balance, decreased safety awareness, decreased lower extremity function, impaired cognition, decreased coordination, impaired cardiopulmonary response to activity .    Pt seen supine in bed, sleepy today. Pt requiring mod assist to sit EOB and completed LE's thera ex. Sit to stand mod/max assist x2 to chair. Pt had difficulty maintaining standing today. Pt had an urge to have BM and transferred to bedside commode with good BM. Total assist for hygiene care and donning of diaper.   Pt to benefit from rehab.    Rehab Prognosis: Fair; patient would benefit from acute skilled PT services to address these deficits and reach maximum level of function.    Recent Surgery: * No surgery found *      Plan:     During this hospitalization, patient to be seen daily to address the identified rehab impairments via therapeutic activities, gait training, therapeutic exercises, neuromuscular re-education and progress toward the following goals:    Plan of Care Expires:  07/31/23    Subjective   Spouse at bedside and supportive  Chief Complaint: sleepy- did not sleep well. Pt with hypoglycemic episode through the nite  Patient/Family Comments/goals: none stated  Pain/Comfort:         Objective:     Communicated with nurse Ann prior to session.  Patient found HOB elevated with telemetry, PureWick upon PT entry to room.     General Precautions: Standard, fall, aphasia,  aspiration  Orthopedic Precautions: N/A  Braces: N/A  Respiratory Status: Room air     Functional Mobility:  Bed Mobility:     Rolling Left:  minimum assistance  Scooting: moderate assistance  Supine to Sit: moderate assistance  Transfers:     Sit to Stand:  moderate assistance, maximal assistance, and of 2 persons with no AD  Bed to Chair: moderate assistance, maximal assistance, and of 2 persons with  no AD  using  Squat Pivot  Toilet Transfer: maximal assistance and of 2 persons with  no AD  using  Squat Pivot      AM-PAC 6 CLICK MOBILITY          Treatment & Education:  Patient was educated on the importance of OOB activity and functional mobility to negate negative effects of prolonged bed rest during hospitalization, safe transfers and ambulation, and D/C planning   Pt completed LAQ/babs ZAVALA spastic  Pt requiring increased assist level today due to being sleepy    Patient left HOB elevated with all lines intact, call button in reach, and nurse Seth present..    GOALS:   Multidisciplinary Problems       Physical Therapy Goals          Problem: Physical Therapy    Goal Priority Disciplines Outcome Goal Variances Interventions   Physical Therapy Goal     PT, PT/OT Ongoing, Progressing     Description: Goals to be met by: 23     Patient will increase functional independence with mobility by performin. Supine to sit with Contact Guard Assistance  2. Sit to stand transfer with Contact Guard Assistance  3. Bed to chair transfer with Contact Guard Assistance using Rolling Walker  4. Gait  x 150 feet with Contact Guard Assistance using Rolling Walker.   5. Increased functional strength to 4/5 for use in mobility and amb.                         Time Tracking:     PT Received On: 23  PT Start Time: 1018     PT Stop Time: 1106  PT Total Time (min): 48 min     Billable Minutes: Therapeutic Activity 30 and Therapeutic Exercise 18    Treatment Type: Treatment  PT/PTA: PT     Number of PTA visits since  last PT visit: 0     07/06/2023

## 2023-07-06 NOTE — PLAN OF CARE
Per Litzy with [facility], report can be called to [phone]. [facility] transportation to pick pt up at 1545.     07/06/23 1345   Post-Acute Status   Post-Acute Authorization Placement   Post-Acute Placement Status Set-up Complete/Auth obtained

## 2023-07-06 NOTE — PLAN OF CARE
Received auth from [facility] for rehab, DC orders sent to [facility] for review via [email or web address].      07/06/23 1232   Post-Acute Status   Post-Acute Authorization Placement   Post-Acute Placement Status Pending post-acute provider review/more information requested

## 2023-07-06 NOTE — PLAN OF CARE
Plan of care reviewed with patient and spouse, verbalized understanding. Swallow eval today. Tolerating Dysphagia 5 diet and meds crushed in pudding. Assistance with feedings. Worked with PT. Cardiac monitoring Sinus bradycardia throughout day. Neuro checks. Bed in lowest position and call light within reach.

## 2023-07-06 NOTE — NURSING
0445 Pt's blood sugar 35 in both hands. Pt asymptomatic. Snack given. Stat glucose random ordered resulted 51. 0502 Attempted to give D10 bolus but iv leaked at beginning of infusion. 0520 Glucagon IM given while attempting to get new iv. Second snack given. 0540 Blood sugar finger stick 63. NP notified. Will recheck glucose random and finger stick in one hour per Np order

## 2023-07-06 NOTE — PLAN OF CARE
Pt clear for DC from CM standpoint. Discharging to [facility].     07/06/23 1346   Final Note   Assessment Type Final Discharge Note   Anticipated Discharge Disposition Rehab

## 2023-07-06 NOTE — PT/OT/SLP PROGRESS
Speech Language Pathology Treatment    Patient Name:  Steff Johnson   MRN:  [id number]  Admitting Diagnosis: CVA (cerebral vascular accident)    Recommendations:                 General Recommendations:  Dysphagia therapy, Speech/language therapy, and Cognitive-linguistic therapy  Diet recommendations:  Minced & Moist Diet - IDDSI Level 5, Liquid Diet Level: Thin liquids - IDDSI Level 0   Aspiration Precautions:  1 bite/sip at a time, Alternating bites/sips, Assistance with meals, Check for pocketing/oral residue, Eliminate distractions, Feed only when awake/alert, HOB to 90 degrees, Meds crushed in puree, and Small bites/sips     General Precautions: Standard, aspiration  Communication strategies:  provide increased time to answer and go to room if call light pushed    Assessment:     Steff Johnson is a 63 y.o. female with an SLP diagnosis of Dysphagia, Dysarthria, Cognitive-Linguistic Impairment, and Dysphonia.  She presents with mildly improved lingual movement and oral clearance; however, continued slow mastication and lingual residue. Clear speech strategies and swallowing precautions reviewed. Mildly improved receptive language today. Continue POC if pt not discharged today; continue recs for IPR w/ Speech at discharge.    Subjective     Pt awake in bed w/  at bedside. Agreeable to ST.  Patient goals: none stated     Pain/Comfort:       Respiratory Status: Room air    Objective:     Has the patient been evaluated by SLP for swallowing?   Yes  Keep patient NPO? No   Current Respiratory Status:        Pt observed during PO trials of mixed consistency and thin liquids. Mild lingual residue noted; coughing after large sip thin liquid via straw x1 of 5 trials. Small sips eliminated coughing w/ liquids. Pt required cues for 1 bite at a time; liquid wash required to clear lingual residue. Pt w/ improved lingual lateralization and use for clearing buccal cavity. Swallowing precautions reviewed; pt required MAX  cues for recall.    Diaphragmatic breathing introduced and completed w/ modeling x5. Clear speech strategies introduced and practiced. Pt required MAX cues for use of loud and over-articulated speech during name reciting.    Pt followed simple 1-2 step directives w/ 70% accuracy given repetition and mod assistance for mobilizing and executing directives.    Goals:   Multidisciplinary Problems       SLP Goals          Problem: SLP    Goal Priority Disciplines Outcome   SLP Goal     SLP Ongoing, Progressing   Description: 1. Pt will tolerate LRD (IDDSI 7, thin) w/ adequate oral prep, clearance, and no overt s/s aspiration during >95% of PO intake  2. Pt will follow simple 1 step directives given mod cuing w/ 75% accuracy  3. Pt will utilize clear speech strategies during speech drills w/ functional words and phrases w/ 75% accuracy given mod cuing                       Plan:     Patient to be seen:  3 x/week, 4 x/week   Plan of Care expires:     Plan of Care reviewed with:  patient   SLP Follow-Up:  Yes       Discharge recommendations:  rehabilitation facility (needs ST)   Barriers to Discharge:  None    Time Tracking:     SLP Treatment Date:   07/06/23  Speech Start Time:  1405  Speech Stop Time:  1428     Speech Total Time (min):  23 min    Billable Minutes: Speech Therapy Individual 13 min and Treatment Swallowing Dysfunction 10 min    07/06/2023

## 2023-07-06 NOTE — PLAN OF CARE
Problem: Adult Inpatient Plan of Care  Goal: Plan of Care Review  Outcome: Ongoing, Progressing  Goal: Readiness for Transition of Care  Outcome: Ongoing, Progressing     Problem: Communication Impairment (Stroke, Ischemic/Transient Ischemic Attack)  Goal: Improved Communication Skills  Outcome: Ongoing, Progressing     Problem: Functional Ability Impaired (Stroke, Ischemic/Transient Ischemic Attack)  Goal: Optimal Functional Ability  Outcome: Ongoing, Progressing     Problem: Swallowing Impairment (Stroke, Ischemic/Transient Ischemic Attack)  Goal: Optimal Eating and Swallowing without Aspiration  Outcome: Ongoing, Progressing     Problem: Fall Injury Risk  Goal: Absence of Fall and Fall-Related Injury  Outcome: Ongoing, Progressing     Problem: Infection  Goal: Absence of Infection Signs and Symptoms  Outcome: Ongoing, Progressing     Problem: Skin Injury Risk Increased  Goal: Skin Health and Integrity  Outcome: Ongoing, Progressing

## 2023-07-06 NOTE — NURSING
Finger stick blood sugar obtained with day shift nurse. Blood sugar 195. Pt awake and alert. No complaints. Plan of care taken over by Seth

## 2023-07-06 NOTE — NURSING
D/c education provided, pt and family verbalized understanding. PIV and tele removed, belongings in hand, pt left floor safely via wheelchair.

## 2023-07-07 ENCOUNTER — HOSPITAL ENCOUNTER (OUTPATIENT)
Dept: RADIOLOGY | Facility: HOSPITAL | Age: 63
Discharge: HOME | End: 2023-07-07
Attending: PHYSICAL MEDICINE & REHABILITATION
Payer: MEDICARE

## 2023-07-07 LAB
CARDIOLIPIN IGG SER IA-ACNC: <9.4 GPL (ref 0–14.99)
CARDIOLIPIN IGM SER IA-ACNC: <9.4 MPL (ref 0–12.49)
F2 C.20210G>A GENO BLD/T: NEGATIVE
F5 P.R506Q BLD/T QL: NEGATIVE
LPA SERPL-MCNC: 40 MG/DL (ref 0–30)

## 2023-07-07 PROCEDURE — 70450 CT HEAD/BRAIN W/O DYE: CPT | Mod: 26,HCNC,, | Performed by: RADIOLOGY

## 2023-07-07 PROCEDURE — 70450 CT HEAD WITHOUT CONTRAST: ICD-10-PCS | Mod: 26,HCNC,, | Performed by: RADIOLOGY

## 2023-07-07 NOTE — DISCHARGE SUMMARY
"[facility]  Discharge Summary      Patient Name: Steff Johnson  MRN: [id number]  JOYA: [id number]  Patient Class: IP- Inpatient  Admission Date: 7/3/2023  Hospital Length of Stay: 3 days  Discharge Date and Time: 7/6/2023  4:28 PM  Attending Physician: No att. providers found   Discharging Provider: Darryl Riggs MD  Primary Care Provider: Cristina Ren MD    Primary Care Team: Networked reference to record PCT     HPI:   Ms. 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 It is moderate. It is associated with slurred speech and increased fatigue. She denies CP or SOB. She is a poor historian and  is at bedside who supplements and denies facial asymmetry. He states symptoms began 2-3 days ago. In the ED, CT head was negative for acute abnormality, but MRI revealed: "1. Single small acute infarction in the right frontal white matter. 2. Numerous remote lacunar infarctions as above. 3. Advanced chronic microvascular ischemic changes. 4. Numerous parenchymal microhemorrhages as above which may be related to hypertensive encephalopathy or amyloidosis." This was personally discussed with Dr. Chino Melvin who recommends SBP goal of 180 and DAPT and admission. Hospital medicine is consulted for admission.       * No surgery found *      Hospital Course:   Patient was admitted to the hospital with new onset CVA.  He was started on standard stroke protocol.  MRI revealed possible microhemorrhage, the patient was monitored closely in the hospital for any extension of such.  She was assessed by PT, OT, and speech language pathology.  Patient was also assessed by Neurology, and started on anti-platelet and high-dose intensity statin.  Patient's blood pressure was kept well controlled less than 180/110 given her history of microhemorrhages.  Patient's " insulin was also adjusted secondary to hyperglycemia.  She was set up for discharge to inpatient rehab for further ongoing therapy.         Goals of Care Treatment Preferences:  Code Status: Full Code      Consults:   Consults (From admission, onward)        Status Ordering Provider     Inpatient consult to Registered Dietitian/Nutritionist  Once        Provider:  (Not yet assigned)    Completed WALKER NGUYEN     IP consult to case management/social work  Once        Provider:  (Not yet assigned)    Completed WALKER NGUYEN     Inpatient consult to neurology  Once        Provider:  Jennifer Meyer MD    Completed WALKER NGUYEN          No new Assessment & Plan notes have been filed under this hospital service since the last note was generated.  Service: Hospital Medicine    Final Active Diagnoses:    Diagnosis Date Noted POA    PRINCIPAL PROBLEM:  CVA (cerebral vascular accident) [I63.9] 12/12/2019 Yes    Aphasia [R47.01] 05/31/2023 Yes    Hypertensive emergency [I16.1] 07/02/2021 Yes    Type 2 diabetes mellitus with both eyes affected by moderate nonproliferative retinopathy and macular edema, with long-term current use of insulin [E11.3313, Z79.4] 08/07/2019 Not Applicable    Mixed hyperlipidemia [E78.2] 07/19/2018 Yes      Problems Resolved During this Admission:       Discharged Condition: stable    Disposition: Rehab Facility    Follow Up:    Patient Instructions:      Diet diabetic     Call 911 for any of the following:   Order Comments: Call 911  right away if any of the following warning signs come on suddenly, even if the symptoms only last for a few minutes. With stroke, timing is very important.   - Warning Signs of Stroke:  - Weakness: You may feel a sudden weakness, tingling or loss of feeling on one side of your face or body.  - Vision Problems: You may have sudden double vision or trouble seeing in one or both eyes.  - Speech Problems: You may have sudden trouble talking, slured  speech, or problems understanding others.  - Headache: You may have sudden, severe headache.  - Movement Problems: You may experience dizziness, a feeling of spinning, a loss of balance, a feeling of falling or blackouts.     Activity as tolerated       Significant Diagnostic Studies: N/A    Pending Diagnostic Studies:     Procedure Component Value Units Date/Time    Cardiolipin antibody [[id number]] Collected: 07/04/23 1605    Order Status: Sent Lab Status: In process Updated: 07/04/23 1614    Specimen: Blood     Factor 5 leiden [[id number]] Collected: 07/04/23 1605    Order Status: Sent Lab Status: In process Updated: 07/04/23 1614    Specimen: Blood     Homocysteine, serum [[id number]] Collected: 07/04/23 1605    Order Status: Sent Lab Status: In process Updated: 07/04/23 1614    Specimen: Blood     Lipoprotein A (LPA) [[id number]] Collected: 07/04/23 1605    Order Status: Sent Lab Status: In process Updated: 07/04/23 1614    Specimen: Blood     Prothrombin gene mutation [[id number]] Collected: 07/04/23 1605    Order Status: Sent Lab Status: In process Updated: 07/04/23 1614    Specimen: Blood          Medications:  Reconciled Home Medications:      Medication List      START taking these medications    clopidogreL 75 mg tablet  Commonly known as: PLAVIX  Take 1 tablet (75 mg total) by mouth once daily.     insulin aspart U-100 100 unit/mL (3 mL) Inpn pen  Commonly known as: NovoLOG  Inject 5 Units into the skin 3 (three) times daily.  Replaces: insulin aspart U-100 100 unit/mL injection     insulin detemir U-100 (Levemir) 100 unit/mL (3 mL) Inpn pen  Inject 20 Units into the skin every evening.  Replaces: insulin degludec 100 unit/mL (3 mL) insulin pen        CHANGE how you take these medications    cloNIDine 0.1 MG tablet  Commonly known as: CATAPRES  Take 1 tablet (0.1 mg total) by mouth 3 (three) times daily.  What changed: when to take this        CONTINUE taking these medications    amLODIPine 10 MG  "tablet  Commonly known as: NORVASC  Take 1 tablet (10 mg total) by mouth once daily.     aspirin 81 MG Chew  Take 1 tablet (81 mg total) by mouth once daily.     atorvastatin 80 MG tablet  Commonly known as: LIPITOR  Take 1 tablet (80 mg total) by mouth every evening.     hydrALAZINE 100 MG tablet  Commonly known as: APRESOLINE  Take 1 tablet (100 mg total) by mouth 3 (three) times daily.     latanoprost 0.005 % ophthalmic solution  Place 1 drop into both eyes once daily.     losartan 100 MG tablet  Commonly known as: COZAAR  Take 1 tablet (100 mg total) by mouth once daily.     metoprolol succinate 100 MG 24 hr tablet  Commonly known as: TOPROL-XL  Take 1 tablet (100 mg total) by mouth once daily.     tiotropium 18 mcg inhalation capsule  Commonly known as: SPIRIVA  Inhale 1 capsule (18 mcg total) into the lungs once daily. Controller        STOP taking these medications    insulin aspart U-100 100 unit/mL injection  Commonly known as: NovoLOG  Replaced by: insulin aspart U-100 100 unit/mL (3 mL) Inpn pen     insulin degludec 100 unit/mL (3 mL) insulin pen  Commonly known as: TRESIBA FLEXTOUCH U-100  Replaced by: insulin detemir U-100 (Levemir) 100 unit/mL (3 mL) Inpn pen     OZURDEX 0.7 mg Impl intravitreal implant  Generic drug: dexAMETHasone     pen needle, diabetic 31 gauge x 5/16" Ndle  Commonly known as: BD ULTRA-FINE SHORT PEN NEEDLE            Indwelling Lines/Drains at time of discharge:   Lines/Drains/Airways     None                 Time spent on the discharge of patient: 37 minutes         Darryl Riggs MD  Department of Hospital Medicine  [facility]/Surg  "

## 2023-07-08 RX ORDER — NAPROXEN SODIUM 220 MG/1
81 TABLET, FILM COATED ORAL DAILY
Qty: 30 TABLET | Refills: 0 | Status: ON HOLD | OUTPATIENT
Start: 2023-07-08 | End: 2024-02-29

## 2023-07-10 LAB — HCYS SERPL-SCNC: 11.3 NMOL/ML (ref 5.6–15.6)

## 2023-07-14 PROCEDURE — 97162 PT EVAL MOD COMPLEX 30 MIN: CPT

## 2023-07-14 PROCEDURE — 97530 THERAPEUTIC ACTIVITIES: CPT

## 2023-07-29 ENCOUNTER — HOSPITAL ENCOUNTER (OUTPATIENT)
Dept: RADIOLOGY | Facility: HOSPITAL | Age: 63
Discharge: HOME | End: 2023-07-29
Attending: PHYSICAL MEDICINE & REHABILITATION
Payer: MEDICARE

## 2023-07-29 PROCEDURE — 71045 X-RAY EXAM CHEST 1 VIEW: CPT | Mod: 26,HCNC,, | Performed by: RADIOLOGY

## 2023-07-29 PROCEDURE — 71045 XR CHEST 1 VIEW: ICD-10-PCS | Mod: 26,HCNC,, | Performed by: RADIOLOGY

## 2023-08-01 ENCOUNTER — DOCUMENTATION ONLY (OUTPATIENT)
Dept: REHABILITATION | Facility: HOSPITAL | Age: 63
End: 2023-08-01
Payer: MEDICARE

## 2023-08-01 NOTE — PROGRESS NOTES
Outpatient Therapy Discharge Summary   Discharge Date: 8/1/2023   Name: Steff Johnson  Clinic Number: [id number]  Therapy Diagnosis: Dysarthria, cognitive communication deficit, aphasia, dysphonia  Physician: Cristina Ren MD  Physician Orders: Ambulatory referral to speech therapy  Medical Diagnosis:   I63.9 (ICD-10-CM) - Ischemic stroke   R47.1 (ICD-10-CM) - Dysarthria   Evaluation Date: 5/31/2023    Date of Last visit: 6/30/2023  Total Visits Received: 2  Cancelled Visits: 0  No Show Visits: 0    Assessment    Assessment of Current Status:   6/30/2023 - Patient presented with several characteristics apraxia of speech during evaluation at the last session. Patient was noted to exhibit several instances during action direction following where she appeared to understand the direction but was unable to perform the direction unless given a model. Recommend monitoring for apraxia. Patient with fair ability to answer complex comparative yes/no questions. Very limited divergent naming. Patient noted to be fatigued as evidenced by yawning and closing her eyes. Limited self-initiation of language.     7/3/2023 - Patient presenting with significant decline in physical functioning during occupational therapy session. Occupational and physical therapist concern with significant change. Team determined patient should be admitted. Patient was worked up for a new stroke in the hospital and received inpatient therapies.    Goals:   1. Patient will answer complex yes/no questions with 80% accuracy to target auditory comprehension. - progressing/not met  2. Patient will follow 2-3 step directions with 80% accuracy across three sessions. - progressing/not met   3. Patient will identify at least 4 word finding strategies across three sessions. - progressing/not met  4. Patient will identify x10 items within a concrete divergent naming task within 1-2 minutes. - progressing/not met  5. Patient will participate in Verb Network  Strength Training for 2-4 verbs per session with min A to improve word fluency. - progressing/not met  6. Patient will describe a picture using at least 5/6 characteristics given a Semantic Feature Analysis visual in 3/3 trials per session to address word finding strategies.  - progressing/not met  7. Patient will answer orientation questions: month, year, day of the week, place, etiology with 100% accuracy across three sessions. - progressing/not met  8. Patient will complete low level functional problem solving tasks with 80% accuracy given minimal verbal and visual cues. - progressing/not met  9. Complete motor speech evaluation to evaluate dysarthria. - MET  10. Patient will utilize diaphragmatic breathing to produce a 6 second exhalation with moderate facilitation by Speech Language Pathologist. - progressing/not met  11. Patient will recall 3/4 clear speech strategies given minimal cues. - progressing/not met    Long Term Goals:  1. Patient will develop functional expressive language skills to communicate wants,needs, and emotions effectively to variety of communication partners in medical and home environments - progressing/not met   2. Pt will improve cognitive skills to effectively utilize compensatory strategies to communicate wants and needs to different conversational partners, maintain safety, and participate socially in functional living environment. - progressing/not met  3. Patient will demonstrate independent use of clear speech strategies and environmental modifications to facilitate communication. - progressing/not met      Discharge reason: Patient admitted to the hospital for a new stroke and received inpatient therapies. Patient will need a new referral to begin outpatient speech therapy services again.     Plan   This patient is discharged from Speech Therapy    Nancy Dodson CCC-SLP   8/1/2023

## 2023-08-04 ENCOUNTER — HOSPITAL ENCOUNTER (INPATIENT)
Facility: HOSPITAL | Age: 63
LOS: 7 days | Discharge: HOME HEALTH | DRG: 070 | End: 2023-08-11
Attending: EMERGENCY MEDICINE | Admitting: INTERNAL MEDICINE
Payer: MEDICARE

## 2023-08-04 DIAGNOSIS — U07.1 COVID-19 VIRUS DETECTED: ICD-10-CM

## 2023-08-04 DIAGNOSIS — G93.40 ACUTE ENCEPHALOPATHY: Primary | ICD-10-CM

## 2023-08-04 DIAGNOSIS — I63.9 STROKE: ICD-10-CM

## 2023-08-04 DIAGNOSIS — R47.1 DYSARTHRIA: ICD-10-CM

## 2023-08-04 DIAGNOSIS — R41.82 AMS (ALTERED MENTAL STATUS): ICD-10-CM

## 2023-08-04 LAB
ALBUMIN SERPL BCP-MCNC: 3.6 G/DL (ref 3.5–5.2)
ALP SERPL-CCNC: 115 U/L (ref 55–135)
ALT SERPL W/O P-5'-P-CCNC: 44 U/L (ref 10–44)
ANION GAP SERPL CALC-SCNC: 5 MMOL/L (ref 8–16)
AST SERPL-CCNC: 34 U/L (ref 10–40)
BACTERIA #/AREA URNS HPF: ABNORMAL /HPF
BASOPHILS # BLD AUTO: 0.05 K/UL (ref 0–0.2)
BASOPHILS NFR BLD: 0.3 % (ref 0–1.9)
BILIRUB SERPL-MCNC: 0.3 MG/DL (ref 0.1–1)
BILIRUB UR QL STRIP: NEGATIVE
BUN SERPL-MCNC: 37 MG/DL (ref 8–23)
CALCIUM SERPL-MCNC: 8.9 MG/DL (ref 8.7–10.5)
CHLORIDE SERPL-SCNC: 115 MMOL/L (ref 95–110)
CHOLEST SERPL-MCNC: 92 MG/DL (ref 120–199)
CHOLEST/HDLC SERPL: 2.4 {RATIO} (ref 2–5)
CLARITY UR: CLEAR
CO2 SERPL-SCNC: 22 MMOL/L (ref 23–29)
COLOR UR: YELLOW
CREAT SERPL-MCNC: 1.6 MG/DL (ref 0.5–1.4)
CREAT SERPL-MCNC: 1.6 MG/DL (ref 0.5–1.4)
DIFFERENTIAL METHOD: ABNORMAL
EOSINOPHIL # BLD AUTO: 0 K/UL (ref 0–0.5)
EOSINOPHIL NFR BLD: 0.2 % (ref 0–8)
ERYTHROCYTE [DISTWIDTH] IN BLOOD BY AUTOMATED COUNT: 15.2 % (ref 11.5–14.5)
EST. GFR  (NO RACE VARIABLE): 35.6 ML/MIN/1.73 M^2
GLUCOSE SERPL-MCNC: 132 MG/DL (ref 70–110)
GLUCOSE SERPL-MCNC: 141 MG/DL (ref 70–110)
GLUCOSE SERPL-MCNC: 160 MG/DL (ref 70–110)
GLUCOSE UR QL STRIP: NEGATIVE
HCT VFR BLD AUTO: 30.7 % (ref 37–48.5)
HDLC SERPL-MCNC: 38 MG/DL (ref 40–75)
HDLC SERPL: 41.3 % (ref 20–50)
HGB BLD-MCNC: 10.7 G/DL (ref 12–16)
HGB UR QL STRIP: NEGATIVE
HYALINE CASTS #/AREA URNS LPF: 13 /LPF
IMM GRANULOCYTES # BLD AUTO: 0.13 K/UL (ref 0–0.04)
IMM GRANULOCYTES NFR BLD AUTO: 0.8 % (ref 0–0.5)
INR PPP: 1 (ref 0.8–1.2)
KETONES UR QL STRIP: ABNORMAL
LACTATE SERPL-SCNC: 1.7 MMOL/L (ref 0.5–1.9)
LDLC SERPL CALC-MCNC: 41.6 MG/DL (ref 63–159)
LEUKOCYTE ESTERASE UR QL STRIP: NEGATIVE
LYMPHOCYTES # BLD AUTO: 1.3 K/UL (ref 1–4.8)
LYMPHOCYTES NFR BLD: 8.1 % (ref 18–48)
MCH RBC QN AUTO: 28.4 PG (ref 27–31)
MCHC RBC AUTO-ENTMCNC: 34.9 G/DL (ref 32–36)
MCV RBC AUTO: 81 FL (ref 82–98)
MICROSCOPIC COMMENT: ABNORMAL
MONOCYTES # BLD AUTO: 1.1 K/UL (ref 0.3–1)
MONOCYTES NFR BLD: 6.5 % (ref 4–15)
NEUTROPHILS # BLD AUTO: 13.7 K/UL (ref 1.8–7.7)
NEUTROPHILS NFR BLD: 84.1 % (ref 38–73)
NITRITE UR QL STRIP: NEGATIVE
NONHDLC SERPL-MCNC: 54 MG/DL
NRBC BLD-RTO: 0 /100 WBC
PH UR STRIP: 6 [PH] (ref 5–8)
PLATELET # BLD AUTO: 400 K/UL (ref 150–450)
PMV BLD AUTO: 10.5 FL (ref 9.2–12.9)
POTASSIUM SERPL-SCNC: 5 MMOL/L (ref 3.5–5.1)
PROT SERPL-MCNC: 7.8 G/DL (ref 6–8.4)
PROT UR QL STRIP: ABNORMAL
PROTHROMBIN TIME: 11 SEC (ref 9–12.5)
RBC # BLD AUTO: 3.77 M/UL (ref 4–5.4)
RBC #/AREA URNS HPF: 1 /HPF (ref 0–4)
SAMPLE: ABNORMAL
SARS-COV-2 RDRP RESP QL NAA+PROBE: POSITIVE
SODIUM SERPL-SCNC: 142 MMOL/L (ref 136–145)
SP GR UR STRIP: >1.03 (ref 1–1.03)
SQUAMOUS #/AREA URNS HPF: 3 /HPF
TRIGL SERPL-MCNC: 62 MG/DL (ref 30–150)
TROPONIN I SERPL HS-MCNC: 3.3 PG/ML (ref 0–14.9)
TSH SERPL DL<=0.005 MIU/L-ACNC: 0.77 UIU/ML (ref 0.34–5.6)
URN SPEC COLLECT METH UR: ABNORMAL
UROBILINOGEN UR STRIP-ACNC: NEGATIVE EU/DL
WBC # BLD AUTO: 16.23 K/UL (ref 3.9–12.7)
WBC #/AREA URNS HPF: 4 /HPF (ref 0–5)

## 2023-08-04 PROCEDURE — 84443 ASSAY THYROID STIM HORMONE: CPT | Performed by: STUDENT IN AN ORGANIZED HEALTH CARE EDUCATION/TRAINING PROGRAM

## 2023-08-04 PROCEDURE — 82565 ASSAY OF CREATININE: CPT

## 2023-08-04 PROCEDURE — 93005 ELECTROCARDIOGRAM TRACING: CPT | Performed by: INTERNAL MEDICINE

## 2023-08-04 PROCEDURE — 93010 EKG 12-LEAD: ICD-10-PCS | Mod: ,,, | Performed by: INTERNAL MEDICINE

## 2023-08-04 PROCEDURE — 85025 COMPLETE CBC W/AUTO DIFF WBC: CPT | Performed by: STUDENT IN AN ORGANIZED HEALTH CARE EDUCATION/TRAINING PROGRAM

## 2023-08-04 PROCEDURE — 96365 THER/PROPH/DIAG IV INF INIT: CPT

## 2023-08-04 PROCEDURE — 85610 PROTHROMBIN TIME: CPT | Performed by: STUDENT IN AN ORGANIZED HEALTH CARE EDUCATION/TRAINING PROGRAM

## 2023-08-04 PROCEDURE — 93010 ELECTROCARDIOGRAM REPORT: CPT | Mod: ,,, | Performed by: INTERNAL MEDICINE

## 2023-08-04 PROCEDURE — 82962 GLUCOSE BLOOD TEST: CPT

## 2023-08-04 PROCEDURE — 63600175 PHARM REV CODE 636 W HCPCS: Performed by: STUDENT IN AN ORGANIZED HEALTH CARE EDUCATION/TRAINING PROGRAM

## 2023-08-04 PROCEDURE — 96361 HYDRATE IV INFUSION ADD-ON: CPT

## 2023-08-04 PROCEDURE — 80053 COMPREHEN METABOLIC PANEL: CPT | Performed by: STUDENT IN AN ORGANIZED HEALTH CARE EDUCATION/TRAINING PROGRAM

## 2023-08-04 PROCEDURE — 21000000 HC CCU ICU ROOM CHARGE

## 2023-08-04 PROCEDURE — U0002 COVID-19 LAB TEST NON-CDC: HCPCS | Performed by: STUDENT IN AN ORGANIZED HEALTH CARE EDUCATION/TRAINING PROGRAM

## 2023-08-04 PROCEDURE — 80061 LIPID PANEL: CPT | Performed by: STUDENT IN AN ORGANIZED HEALTH CARE EDUCATION/TRAINING PROGRAM

## 2023-08-04 PROCEDURE — 81001 URINALYSIS AUTO W/SCOPE: CPT | Performed by: STUDENT IN AN ORGANIZED HEALTH CARE EDUCATION/TRAINING PROGRAM

## 2023-08-04 PROCEDURE — 25000003 PHARM REV CODE 250: Performed by: INTERNAL MEDICINE

## 2023-08-04 PROCEDURE — 25500020 PHARM REV CODE 255: Performed by: EMERGENCY MEDICINE

## 2023-08-04 PROCEDURE — 95819 EEG AWAKE AND ASLEEP: CPT

## 2023-08-04 PROCEDURE — 84484 ASSAY OF TROPONIN QUANT: CPT | Performed by: STUDENT IN AN ORGANIZED HEALTH CARE EDUCATION/TRAINING PROGRAM

## 2023-08-04 PROCEDURE — 99285 EMERGENCY DEPT VISIT HI MDM: CPT | Mod: 25

## 2023-08-04 PROCEDURE — 83605 ASSAY OF LACTIC ACID: CPT | Performed by: STUDENT IN AN ORGANIZED HEALTH CARE EDUCATION/TRAINING PROGRAM

## 2023-08-04 PROCEDURE — 63600175 PHARM REV CODE 636 W HCPCS: Performed by: INTERNAL MEDICINE

## 2023-08-04 RX ORDER — METOPROLOL SUCCINATE 50 MG/1
100 TABLET, EXTENDED RELEASE ORAL DAILY
Status: DISCONTINUED | OUTPATIENT
Start: 2023-08-05 | End: 2023-08-08

## 2023-08-04 RX ORDER — DANTROLENE SODIUM 50 MG/1
50 CAPSULE ORAL 3 TIMES DAILY
Status: ON HOLD | COMMUNITY
Start: 2023-08-01 | End: 2023-08-16 | Stop reason: HOSPADM

## 2023-08-04 RX ORDER — MINOXIDIL 10 MG/1
10 TABLET ORAL 2 TIMES DAILY
Status: ON HOLD | COMMUNITY
Start: 2023-08-04 | End: 2023-12-15

## 2023-08-04 RX ORDER — INSULIN LISPRO 100 [IU]/ML
5 INJECTION, SOLUTION INTRAVENOUS; SUBCUTANEOUS 3 TIMES DAILY
Status: ON HOLD | COMMUNITY
Start: 2023-07-28 | End: 2023-10-27 | Stop reason: HOSPADM

## 2023-08-04 RX ORDER — LEVETIRACETAM 10 MG/ML
2000 INJECTION INTRAVASCULAR
Status: DISCONTINUED | OUTPATIENT
Start: 2023-08-04 | End: 2023-08-04

## 2023-08-04 RX ORDER — MUPIROCIN 20 MG/G
OINTMENT TOPICAL 2 TIMES DAILY
Status: DISPENSED | OUTPATIENT
Start: 2023-08-04 | End: 2023-08-09

## 2023-08-04 RX ORDER — ATORVASTATIN CALCIUM 40 MG/1
80 TABLET, FILM COATED ORAL NIGHTLY
Status: DISCONTINUED | OUTPATIENT
Start: 2023-08-04 | End: 2023-08-11 | Stop reason: HOSPADM

## 2023-08-04 RX ORDER — IPRATROPIUM BROMIDE 0.5 MG/2.5ML
0.5 SOLUTION RESPIRATORY (INHALATION) EVERY 6 HOURS
Status: DISCONTINUED | OUTPATIENT
Start: 2023-08-05 | End: 2023-08-05

## 2023-08-04 RX ORDER — INSULIN ASPART 100 [IU]/ML
0-5 INJECTION, SOLUTION INTRAVENOUS; SUBCUTANEOUS EVERY 6 HOURS PRN
Status: DISCONTINUED | OUTPATIENT
Start: 2023-08-04 | End: 2023-08-11 | Stop reason: HOSPADM

## 2023-08-04 RX ORDER — ESCITALOPRAM OXALATE 10 MG/1
10 TABLET ORAL NIGHTLY
Status: DISCONTINUED | OUTPATIENT
Start: 2023-08-04 | End: 2023-08-11 | Stop reason: HOSPADM

## 2023-08-04 RX ORDER — SODIUM CHLORIDE, SODIUM LACTATE, POTASSIUM CHLORIDE, CALCIUM CHLORIDE 600; 310; 30; 20 MG/100ML; MG/100ML; MG/100ML; MG/100ML
1000 INJECTION, SOLUTION INTRAVENOUS
Status: DISCONTINUED | OUTPATIENT
Start: 2023-08-04 | End: 2023-08-04

## 2023-08-04 RX ORDER — LEVETIRACETAM 10 MG/ML
1000 INJECTION INTRAVASCULAR
Status: COMPLETED | OUTPATIENT
Start: 2023-08-04 | End: 2023-08-04

## 2023-08-04 RX ORDER — TALC
6 POWDER (GRAM) TOPICAL NIGHTLY PRN
Status: DISCONTINUED | OUTPATIENT
Start: 2023-08-04 | End: 2023-08-11 | Stop reason: HOSPADM

## 2023-08-04 RX ORDER — AMLODIPINE BESYLATE 5 MG/1
10 TABLET ORAL DAILY
Status: DISCONTINUED | OUTPATIENT
Start: 2023-08-05 | End: 2023-08-11 | Stop reason: HOSPADM

## 2023-08-04 RX ORDER — CLOPIDOGREL BISULFATE 75 MG/1
75 TABLET ORAL DAILY
Status: DISCONTINUED | OUTPATIENT
Start: 2023-08-05 | End: 2023-08-05

## 2023-08-04 RX ORDER — MODAFINIL 100 MG/1
200 TABLET ORAL DAILY
Status: DISCONTINUED | OUTPATIENT
Start: 2023-08-05 | End: 2023-08-11 | Stop reason: HOSPADM

## 2023-08-04 RX ORDER — LEVETIRACETAM 500 MG/5ML
750 INJECTION, SOLUTION, CONCENTRATE INTRAVENOUS EVERY 12 HOURS
Status: DISCONTINUED | OUTPATIENT
Start: 2023-08-05 | End: 2023-08-04

## 2023-08-04 RX ORDER — INSULIN ASPART 100 [IU]/ML
5 INJECTION, SOLUTION INTRAVENOUS; SUBCUTANEOUS 3 TIMES DAILY
Status: CANCELLED | OUTPATIENT
Start: 2023-08-04

## 2023-08-04 RX ORDER — SODIUM CHLORIDE 0.9 % (FLUSH) 0.9 %
10 SYRINGE (ML) INJECTION
Status: DISCONTINUED | OUTPATIENT
Start: 2023-08-04 | End: 2023-08-11 | Stop reason: HOSPADM

## 2023-08-04 RX ORDER — NAPROXEN SODIUM 220 MG/1
81 TABLET, FILM COATED ORAL DAILY
Status: DISCONTINUED | OUTPATIENT
Start: 2023-08-05 | End: 2023-08-11 | Stop reason: HOSPADM

## 2023-08-04 RX ORDER — GLUCAGON 1 MG
1 KIT INJECTION
Status: DISCONTINUED | OUTPATIENT
Start: 2023-08-04 | End: 2023-08-11 | Stop reason: HOSPADM

## 2023-08-04 RX ORDER — INSULIN GLARGINE 100 [IU]/ML
13 INJECTION, SOLUTION SUBCUTANEOUS NIGHTLY
COMMUNITY
Start: 2023-07-28 | End: 2023-09-26

## 2023-08-04 RX ORDER — DANTROLENE SODIUM 25 MG/1
50 CAPSULE ORAL 3 TIMES DAILY
Status: DISCONTINUED | OUTPATIENT
Start: 2023-08-05 | End: 2023-08-11 | Stop reason: HOSPADM

## 2023-08-04 RX ORDER — ESCITALOPRAM OXALATE 10 MG/1
10 TABLET ORAL NIGHTLY
Status: ON HOLD | COMMUNITY
Start: 2023-07-28 | End: 2023-10-27 | Stop reason: HOSPADM

## 2023-08-04 RX ORDER — HYDRALAZINE HYDROCHLORIDE 20 MG/ML
10 INJECTION INTRAMUSCULAR; INTRAVENOUS
Status: COMPLETED | OUTPATIENT
Start: 2023-08-04 | End: 2023-08-04

## 2023-08-04 RX ORDER — SODIUM CHLORIDE 9 MG/ML
INJECTION, SOLUTION INTRAVENOUS CONTINUOUS
Status: DISCONTINUED | OUTPATIENT
Start: 2023-08-04 | End: 2023-08-05

## 2023-08-04 RX ORDER — MODAFINIL 200 MG/1
200 TABLET ORAL DAILY
COMMUNITY
Start: 2023-07-28 | End: 2023-08-27

## 2023-08-04 RX ADMIN — SODIUM CHLORIDE: 0.9 INJECTION, SOLUTION INTRAVENOUS at 10:08

## 2023-08-04 RX ADMIN — SODIUM CHLORIDE, SODIUM LACTATE, POTASSIUM CHLORIDE, AND CALCIUM CHLORIDE 1000 ML: .6; .31; .03; .02 INJECTION, SOLUTION INTRAVENOUS at 07:08

## 2023-08-04 RX ADMIN — HYDRALAZINE HYDROCHLORIDE 10 MG: 20 INJECTION INTRAMUSCULAR; INTRAVENOUS at 11:08

## 2023-08-04 RX ADMIN — IOHEXOL 100 ML: 350 INJECTION, SOLUTION INTRAVENOUS at 06:08

## 2023-08-04 RX ADMIN — LEVETIRACETAM 1000 MG: 10 INJECTION INTRAVENOUS at 08:08

## 2023-08-05 DIAGNOSIS — I63.9 CRYPTOGENIC STROKE: Primary | ICD-10-CM

## 2023-08-05 LAB
ANION GAP SERPL CALC-SCNC: 8 MMOL/L (ref 8–16)
BASOPHILS # BLD AUTO: 0.04 K/UL (ref 0–0.2)
BASOPHILS NFR BLD: 0.3 % (ref 0–1.9)
BUN SERPL-MCNC: 31 MG/DL (ref 8–23)
CALCIUM SERPL-MCNC: 8.6 MG/DL (ref 8.7–10.5)
CHLORIDE SERPL-SCNC: 116 MMOL/L (ref 95–110)
CO2 SERPL-SCNC: 20 MMOL/L (ref 23–29)
CREAT SERPL-MCNC: 1.2 MG/DL (ref 0.5–1.4)
DIFFERENTIAL METHOD: ABNORMAL
EOSINOPHIL # BLD AUTO: 0.1 K/UL (ref 0–0.5)
EOSINOPHIL NFR BLD: 0.4 % (ref 0–8)
ERYTHROCYTE [DISTWIDTH] IN BLOOD BY AUTOMATED COUNT: 15.2 % (ref 11.5–14.5)
EST. GFR  (NO RACE VARIABLE): 50.2 ML/MIN/1.73 M^2
ESTIMATED AVG GLUCOSE: 137 MG/DL (ref 68–131)
GLUCOSE SERPL-MCNC: 106 MG/DL (ref 70–110)
GLUCOSE SERPL-MCNC: 119 MG/DL (ref 70–110)
HBA1C MFR BLD: 6.4 % (ref 4.5–6.2)
HCT VFR BLD AUTO: 28.6 % (ref 37–48.5)
HGB BLD-MCNC: 10 G/DL (ref 12–16)
IMM GRANULOCYTES # BLD AUTO: 0.09 K/UL (ref 0–0.04)
IMM GRANULOCYTES NFR BLD AUTO: 0.7 % (ref 0–0.5)
LYMPHOCYTES # BLD AUTO: 2.7 K/UL (ref 1–4.8)
LYMPHOCYTES NFR BLD: 20.8 % (ref 18–48)
MCH RBC QN AUTO: 28.6 PG (ref 27–31)
MCHC RBC AUTO-ENTMCNC: 35 G/DL (ref 32–36)
MCV RBC AUTO: 82 FL (ref 82–98)
MONOCYTES # BLD AUTO: 1.3 K/UL (ref 0.3–1)
MONOCYTES NFR BLD: 10.5 % (ref 4–15)
NEUTROPHILS # BLD AUTO: 8.6 K/UL (ref 1.8–7.7)
NEUTROPHILS NFR BLD: 67.3 % (ref 38–73)
NRBC BLD-RTO: 0 /100 WBC
PLATELET # BLD AUTO: 315 K/UL (ref 150–450)
PMV BLD AUTO: 9.9 FL (ref 9.2–12.9)
POTASSIUM SERPL-SCNC: 4.3 MMOL/L (ref 3.5–5.1)
RBC # BLD AUTO: 3.5 M/UL (ref 4–5.4)
SODIUM SERPL-SCNC: 144 MMOL/L (ref 136–145)
WBC # BLD AUTO: 12.72 K/UL (ref 3.9–12.7)

## 2023-08-05 PROCEDURE — 94761 N-INVAS EAR/PLS OXIMETRY MLT: CPT

## 2023-08-05 PROCEDURE — 99900035 HC TECH TIME PER 15 MIN (STAT)

## 2023-08-05 PROCEDURE — 21000000 HC CCU ICU ROOM CHARGE

## 2023-08-05 PROCEDURE — 25000003 PHARM REV CODE 250: Performed by: INTERNAL MEDICINE

## 2023-08-05 PROCEDURE — 99900031 HC PATIENT EDUCATION (STAT)

## 2023-08-05 PROCEDURE — 80048 BASIC METABOLIC PNL TOTAL CA: CPT | Performed by: INTERNAL MEDICINE

## 2023-08-05 PROCEDURE — 63600175 PHARM REV CODE 636 W HCPCS: Performed by: INTERNAL MEDICINE

## 2023-08-05 PROCEDURE — 92523 SPEECH SOUND LANG COMPREHEN: CPT

## 2023-08-05 PROCEDURE — 83036 HEMOGLOBIN GLYCOSYLATED A1C: CPT | Performed by: INTERNAL MEDICINE

## 2023-08-05 PROCEDURE — 36415 COLL VENOUS BLD VENIPUNCTURE: CPT | Performed by: INTERNAL MEDICINE

## 2023-08-05 PROCEDURE — 92610 EVALUATE SWALLOWING FUNCTION: CPT

## 2023-08-05 PROCEDURE — 85025 COMPLETE CBC W/AUTO DIFF WBC: CPT | Performed by: INTERNAL MEDICINE

## 2023-08-05 RX ORDER — ENOXAPARIN SODIUM 100 MG/ML
40 INJECTION SUBCUTANEOUS EVERY 24 HOURS
Status: DISCONTINUED | OUTPATIENT
Start: 2023-08-06 | End: 2023-08-11 | Stop reason: HOSPADM

## 2023-08-05 RX ORDER — LEVETIRACETAM 5 MG/ML
500 INJECTION INTRAVASCULAR EVERY 12 HOURS
Status: DISCONTINUED | OUTPATIENT
Start: 2023-08-05 | End: 2023-08-06

## 2023-08-05 RX ORDER — HYDRALAZINE HYDROCHLORIDE 20 MG/ML
10 INJECTION INTRAMUSCULAR; INTRAVENOUS EVERY 8 HOURS PRN
Status: DISCONTINUED | OUTPATIENT
Start: 2023-08-05 | End: 2023-08-11 | Stop reason: HOSPADM

## 2023-08-05 RX ORDER — DEXAMETHASONE SODIUM PHOSPHATE 4 MG/ML
4 INJECTION, SOLUTION INTRA-ARTICULAR; INTRALESIONAL; INTRAMUSCULAR; INTRAVENOUS; SOFT TISSUE EVERY 12 HOURS
Status: DISCONTINUED | OUTPATIENT
Start: 2023-08-05 | End: 2023-08-10

## 2023-08-05 RX ADMIN — HYDRALAZINE HYDROCHLORIDE 10 MG: 20 INJECTION, SOLUTION INTRAMUSCULAR; INTRAVENOUS at 08:08

## 2023-08-05 RX ADMIN — HYDRALAZINE HYDROCHLORIDE 10 MG: 20 INJECTION, SOLUTION INTRAMUSCULAR; INTRAVENOUS at 11:08

## 2023-08-05 RX ADMIN — LEVETIRACETAM 500 MG: 5 INJECTION INTRAVENOUS at 09:08

## 2023-08-05 RX ADMIN — DEXAMETHASONE SODIUM PHOSPHATE 4 MG: 4 INJECTION, SOLUTION INTRA-ARTICULAR; INTRALESIONAL; INTRAMUSCULAR; INTRAVENOUS; SOFT TISSUE at 08:08

## 2023-08-05 RX ADMIN — LEUCINE, PHENYLALANINE, LYSINE, METHIONINE, ISOLEUCINE, VALINE, HISTIDINE, THREONINE, TRYPTOPHAN, ALANINE, GLYCINE, ARGININE, PROLINE, SERINE, TYROSINE, DEXTROSE 2000 ML: 311; 238; 247; 170; 255; 247; 204; 179; 77; 880; 438; 489; 289; 213; 17; 5 INJECTION INTRAVENOUS at 05:08

## 2023-08-05 RX ADMIN — LEVETIRACETAM 750 MG: 100 INJECTION, SOLUTION INTRAVENOUS at 09:08

## 2023-08-05 NOTE — SUBJECTIVE & OBJECTIVE
Interval History: persisting encephlopathy    Review of Systems   Unable to perform ROS: Mental status change     Objective:     Vital Signs (Most Recent):  Temp: 97.9 °F (36.6 °C) (08/05/23 1110)  Pulse: 64 (08/05/23 1110)  Resp: 13 (08/05/23 1110)  BP: (!) 181/66 (08/05/23 1110)  SpO2: 98 % (08/05/23 1110) Vital Signs (24h Range):  Temp:  [97.8 °F (36.6 °C)-98.3 °F (36.8 °C)] 97.9 °F (36.6 °C)  Pulse:  [58-68] 64  Resp:  [10-21] 13  SpO2:  [95 %-99 %] 98 %  BP: (141-216)/(65-97) 181/66     Weight: 55 kg (121 lb 4.1 oz)  Body mass index is 24.49 kg/m².    Intake/Output Summary (Last 24 hours) at 8/5/2023 1301  Last data filed at 8/5/2023 0750  Gross per 24 hour   Intake 556.25 ml   Output 225 ml   Net 331.25 ml         Physical Exam  Vitals and nursing note reviewed.   Constitutional:       Appearance: She is well-developed.   HENT:      Head: Normocephalic and atraumatic.      Right Ear: External ear normal.      Left Ear: External ear normal.      Nose: Nose normal.   Eyes:      Conjunctiva/sclera: Conjunctivae normal.      Pupils: Pupils are equal, round, and reactive to light.   Cardiovascular:      Rate and Rhythm: Normal rate and regular rhythm.      Heart sounds: Normal heart sounds.   Pulmonary:      Effort: Pulmonary effort is normal.      Breath sounds: Normal breath sounds.   Abdominal:      General: Bowel sounds are normal.      Palpations: Abdomen is soft.   Musculoskeletal:         General: Normal range of motion.      Cervical back: Normal range of motion and neck supple.   Skin:     General: Skin is warm and dry.      Capillary Refill: Capillary refill takes less than 2 seconds.   Neurological:      Comments: Encephalopathic: briefly opens eyes to sternal rub, but no other response   Psychiatric:      Comments: Unable to assess             Significant Labs: All pertinent labs within the past 24 hours have been reviewed.  BMP:   Recent Labs   Lab 08/05/23  0408         K 4.3   *    CO2 20*   BUN 31*   CREATININE 1.2   CALCIUM 8.6*     CBC:   Recent Labs   Lab 08/04/23  1831 08/05/23  0408   WBC 16.23* 12.72*   HGB 10.7* 10.0*   HCT 30.7* 28.6*    315       Significant Imaging: I have reviewed all pertinent imaging results/findings within the past 24 hours.

## 2023-08-05 NOTE — NURSING
Nurses Note -- 4 Eyes      8/5/2023   5:29 AM      Skin assessed during: Admit      [x] No Altered Skin Integrity Present    []Prevention Measures Documented      [] Yes- Altered Skin Integrity Present or Discovered   [] LDA Added if Not in Epic (Describe Wound)   [] New Altered Skin Integrity was Present on Admit and Documented in LDA   [] Wound Image Taken    Wound Care Consulted? No    Attending Nurse:  Miriam Sutherland RN    Second RN/Staff Member:   Christopher Nguyen RN

## 2023-08-05 NOTE — ED PROVIDER NOTES
Encounter Date: 8/4/2023       History     Chief Complaint   Patient presents with    Altered Mental Status     HPI    Steff Johnson is a 63-year-old female with a past medical history of CVA, right upper extremity hemiparesis, expressive aphasia, hypertension, hyperlipidemia, type 2 diabetes mellitus presenting to the ED with a chief concern of altered mental status.  Per her daughter, she was diagnosed with COVID last Friday.  Patient was picked up from physical rehab by her daughter today at approximately 1:00 p.m. and notice that she was not speaking as much as she normally does at home.  At baseline, per her daughter, the patient is able to speak in short phrases.  She does not complete sentences.  Patient arrives in the ED was brought back to the [facility] where she was noted to have a fixed left gaze and not be as interactive as she normally is.      Review of patient's allergies indicates:  No Known Allergies  Past Medical History:   Diagnosis Date    Arthritis     Complete tear of left rotator cuff 11/09/2017    COPD (chronic obstructive pulmonary disease)     COVID-19 infection 07/02/2021    Diabetes mellitus     H/o Cerebrovascular accident (CVA) of left pontine structure 12/12/2019    Hypertension     Personal history of colonic polyps     Stroke     Wears glasses      Past Surgical History:   Procedure Laterality Date    COLONOSCOPY N/A 4/11/2017    Procedure: COLONOSCOPY;  Surgeon: Jared Antonio MD;  Location: [facility];  Service: Endoscopy;  Laterality: N/A;    HYSTERECTOMY      OOPHORECTOMY      SHOULDER SURGERY      R    TRANSESOPHAGEAL ECHOCARDIOGRAM WITH POSSIBLE CARDIOVERSION (GT W/ POSS CARDIOVERSION) N/A 5/4/2023    Procedure: Transesophageal echo (GT) intra-procedure log documentation;  Surgeon: Blade Phillip MD;  Location: [facility] CATH/EP LAB;  Service: Cardiology;  Laterality: N/A;     Family History   Problem Relation Age of Onset    Diabetes Mother     Asthma Mother      Hypertension Mother     Diabetes Father     Diabetes Brother     Hypertension Brother     Anemia Daughter     Glaucoma Neg Hx     Macular degeneration Neg Hx     Retinal detachment Neg Hx      Social History     Tobacco Use    Smoking status: Never    Smokeless tobacco: Never   Substance Use Topics    Alcohol use: No    Drug use: No     Review of Systems   Unable to perform ROS: Mental status change       Physical Exam     Initial Vitals [08/04/23 1815]   BP Pulse Resp Temp SpO2   (!) 154/70 64 14 98.3 °F (36.8 °C) 95 %      MAP       --         Physical Exam    Constitutional:   Ill-appearing, not interactive.   HENT:   Head: Normocephalic and atraumatic.   Eyes: Conjunctivae are normal.   Neck: Neck supple.   Cardiovascular:  Normal rate and regular rhythm.           Pulmonary/Chest: Breath sounds normal.   Abdominal: Abdomen is soft.   Musculoskeletal:      Cervical back: Neck supple.      Comments: Contracted extremities especially on right upper extremity     Neurological:   Pt withdraws from pain when IV stuck. More motion on the left than right. Fixed gaze to the left. Nonverbal.   Skin: Skin is warm and dry.       ED Course   Procedures  Labs Reviewed   CBC W/ AUTO DIFFERENTIAL - Abnormal; Notable for the following components:       Result Value    WBC 16.23 (*)     RBC 3.77 (*)     Hemoglobin 10.7 (*)     Hematocrit 30.7 (*)     MCV 81 (*)     RDW 15.2 (*)     Immature Granulocytes 0.8 (*)     Gran # (ANC) 13.7 (*)     Immature Grans (Abs) 0.13 (*)     Mono # 1.1 (*)     Gran % 84.1 (*)     Lymph % 8.1 (*)     All other components within normal limits   COMPREHENSIVE METABOLIC PANEL - Abnormal; Notable for the following components:    Chloride 115 (*)     CO2 22 (*)     Glucose 141 (*)     BUN 37 (*)     Creatinine 1.6 (*)     eGFR 35.6 (*)     Anion Gap 5 (*)     All other components within normal limits   LIPID PANEL - Abnormal; Notable for the following components:    Cholesterol 92 (*)     HDL 38 (*)      LDL Cholesterol 41.6 (*)     All other components within normal limits   URINALYSIS - Abnormal; Notable for the following components:    Specific Gravity, UA >1.030 (*)     Protein, UA 1+ (*)     Ketones, UA Trace (*)     All other components within normal limits    Narrative:     Collection Type->Urine, Clean Catch   SARS-COV-2 RNA AMPLIFICATION, QUAL - Abnormal; Notable for the following components:    SARS-CoV-2 RNA, Amplification, Qual Positive (*)     All other components within normal limits   URINALYSIS MICROSCOPIC - Abnormal; Notable for the following components:    Hyaline Casts, UA 13 (*)     All other components within normal limits    Narrative:     Collection Type->Urine, Clean Catch   POCT GLUCOSE - Abnormal; Notable for the following components:    POC Glucose 160 (*)     All other components within normal limits   ISTAT CREATININE - Abnormal; Notable for the following components:    POC Creatinine 1.6 (*)     All other components within normal limits   PROTIME-INR   TSH   TROPONIN I HIGH SENSITIVITY   LACTIC ACID, PLASMA   POCT GLUCOSE MONITORING CONTINUOUS   POCT CREATININE          Imaging Results              US Carotid Bilateral (In process)                      X-Ray Chest AP Portable (Final result)  Result time 08/04/23 19:09:18      Final result by Rj Kellogg MD (08/04/23 19:09:18)                   Narrative:    History: Stroke.    FINDINGS: AP view of the chest is compared to previous study dated July 29, 2023. No focal infiltrate or pleural effusion is seen. Cardiac silhouette is enlarged. Bony thorax appears intact.    IMPRESSION:  1. No acute cardiopulmonary change seen.    Electronically signed by:  Rj Kellogg MD  8/4/2023 7:09 PM CDT Workstation: [id number]                                     CTA Head and Neck (xpd) (Final result)  Result time 08/04/23 18:55:02      Final result by Rj Kellogg MD (08/04/23 18:55:02)                   Narrative:    CMS MANDATED  QUALITY DATA - CT RADIATION - 436    All CT scans at this facility use dose modulation, iterative-reconstruction, and/or weight-based dosing when appropriate to reduce radiation dose to as low as reasonably achievable.    CMS MANDATED QUALITY [id number]    NASCET CRITERIA WAS UTILIZED FOR ESTIMATION OF STENOSIS SEVERITY WITH THE NARROWEST SEGMENT BEING COMPARED TO THE DISTAL LUMINAL DIAMETER.        HISTORY:Stroke/TIA. Determined embolic source.    TECHNIQUE: Serial axial images were obtained from aortic arch through the vertex with 100 cc of Omnipaque 350 intravenous contrast utilizing a CT angiography protocol. Coronal and sagittal MIP CT angiography reconstructions were performed. Patient received approximately 158 mGy-cm of radiation exposure from this exam.    COMPARISON: Comparison to previous study dated July 4, 2023.    FINDINGS:Aortic arch is unremarkable. Great vessel origins appear widely patent. Ectatic patent cervical carotid arteries are visualized. No evidence of significant cervical carotid artery stenosis is seen. Minimal atherosclerotic changes are present at the left cervical carotid bifurcation. Codominant patent vertebral arteries are visualized.    Internal carotid arteries appear widely patent at the skull base. Codominant vertebral arteries are present at the skull base with a widely patent basilar artery. Bilateral anterior, middle, and posterior cerebral arteries appear patent without evidence of significant stenosis. Patent bilateral posterior communicating arteries are identified.    IMPRESSION:  1. No evidence of cervical carotid/vertebral artery occlusion or significant stenosis.  2. No evidence of large vessel intracranial arterial occlusion or significant stenosis is seen.          Electronically signed by:  Rj Kellogg MD  8/4/2023 6:55 PM CDT Workstation: [id number]                                     CT Head Without Contrast (Edited Result - FINAL)  Result time 08/04/23  19:36:33      Edited Result - FINAL by Fozia Le DO (08/04/23 19:36:33)                   Narrative:         ADDENDUM #1       This report contains critical findings that may be critical to patient care.    At 6:47 PM CST on 8/4/2023 , the report findings were confirmed with Dr. Torrez has received exam report, is aware of the finding(s), and indicated that a call was not necessary to discuss exam findings.        Electronically signed by:  Fozia Le DO  8/4/2023 7:36 PM CDT Workstation: [id number]     ORIGINAL REPORT       CT of the head: 8/4/2023 6:40 PM CDT    HISTORY: 63 years  old Female with Neuro deficit, acute, stroke suspected. Patient found unresponsive, altered mental status    COMPARISON: 7/3/2023    TECHNIQUE: Multiple axial contiguous images were obtained through the head without intravenous contrast administered. This exam was performed according to our departmental dose-optimization program, which includes automated exposure control, adjustment of the mA and/or KV according to the patient's size and/or use of iterative reconstruction technique.    FINDINGS: The ventricles and cerebral sulci demonstrate mild prominence, consistent with cerebral atrophy. There are moderate periventricular hypodensities, suggestive of periventricular white matter changes.    There are similar hypodensities at the left frontal lobe and left basal ganglia.    The gray-white matter differentiation is within normal limits.    Both globes appear symmetric.    The mastoid air cells appear clear.    The visualized paranasal sinuses appear clear.    The calvarium is intact.    No extra-axial fluid collection is seen.    No midline shift or mass effect is apparent.    There are no findings to suggest acute intracranial hemorrhage.    IMPRESSION: 1. No acute intracranial hemorrhage is seen.  2. There is mild to moderate cerebral atrophy and periventricular white matter changes are seen.    Electronically signed by:   Fozia Le DO  8/4/2023 6:41 PM CDT Workstation: [id number]                      Final result by Fozia Le DO (08/04/23 18:41:20)                   Narrative:    CT of the head: 8/4/2023 6:40 PM CDT    HISTORY: 63 years  old Female with Neuro deficit, acute, stroke suspected. Patient found unresponsive, altered mental status    COMPARISON: 7/3/2023    TECHNIQUE: Multiple axial contiguous images were obtained through the head without intravenous contrast administered. This exam was performed according to our departmental dose-optimization program, which includes automated exposure control, adjustment of the mA and/or KV according to the patient's size and/or use of iterative reconstruction technique.    FINDINGS: The ventricles and cerebral sulci demonstrate mild prominence, consistent with cerebral atrophy. There are moderate periventricular hypodensities, suggestive of periventricular white matter changes.    There are similar hypodensities at the left frontal lobe and left basal ganglia.    The gray-white matter differentiation is within normal limits.    Both globes appear symmetric.    The mastoid air cells appear clear.    The visualized paranasal sinuses appear clear.    The calvarium is intact.    No extra-axial fluid collection is seen.    No midline shift or mass effect is apparent.    There are no findings to suggest acute intracranial hemorrhage.    IMPRESSION: 1. No acute intracranial hemorrhage is seen.  2. There is mild to moderate cerebral atrophy and periventricular white matter changes are seen.    Electronically signed by:  Fozia Le DO  8/4/2023 6:41 PM CDT Workstation: [id number]                                     Medications   lactated ringers bolus 1,000 mL (1,000 mLs Intravenous New Bag 8/4/23 1906)   levETIRAcetam in NaCl (iso-os) IVPB 1,000 mg (has no administration in time range)     Followed by   levETIRAcetam in NaCl (iso-os) IVPB 1,000 mg (has no administration in time  range)   iohexoL (OMNIPAQUE 350) injection 100 mL (100 mLs Intravenous Given 8/4/23 1352)     Medical Decision Making:   Initial Assessment:   This is a 63-year-old female with extensive past medical history presenting to the ED with a chief concern of altered mental status.  Vital stable.  On arrival, the patient has right upper extremity residual deficits.  She has a fixed gaze to the left.  She withdraws from some pain mostly moving the left upper and lower extremity.  Differential Diagnosis:   Seizure, TIA, intracranial hemorrhage, UTI, hypertension causing the re-expression of previous symptoms, hepatic versus renal encephalopathy, toxidrome  Independently Interpreted Test(s):   I have ordered and independently interpreted X-rays - see summary below.       <> Summary of X-Ray Reading(s): No acute cardiopulmonary change  Clinical Tests:   Lab Tests: Ordered and Reviewed  The following lab test(s) were unremarkable: CBC, Urinalysis and CMP       <> Summary of Lab: CBC significant [place] [address], CMP unremarkable.  Lactate ordered per Neurology.  Radiological Study: Ordered and Reviewed  ED Management:  Given her previous symptoms, and high risk for stroke, CT head wo and CT head and neck angio ordered. They show no acute results, however there is mild-to-moderate cerebral atrophy and periventricular white matter changes.  Given her fixed gaze to the left, there was concern of a new stroke versus seizure.  Neurology consulted and recommend spot EEG and Keppra loading.  Per the physician on-call, this is likely a TIA but she should receive inpatient workup as well as MRI.  Internal medicine consulted for further care.  Patient was admitted to the service.    Jeanne Torrez DO  [facility] PGY-3               ED Course as of 08/04/23 2009   Fri Aug 04, 2023   1846 CT Head Without Contrast  1. No acute intracranial hemorrhage is seen.  2. There is mild to moderate cerebral atrophy and  periventricular white matter changes are seen. [KB]   1900 CTA Head and Neck (xpd)  1. No evidence of cervical carotid/vertebral artery occlusion or significant stenosis.  2. No evidence of large vessel intracranial arterial occlusion or significant stenosis is seen.    [KB]   1909 WBC(!): 16.23 [KB]   1950 SARS-CoV-2 RNA, Amplification, Qual(!!): Positive  Pt was diagnosed on Friday [KB]   1957 Neurology consulted believe that is likely a TIA, less likely a seizure but will obtain EEG and Keppra load per their recommendations. Pt to be admitted for MRI. [KB]   1959 Creatinine(!): 1.6  COURTNEY compared to previous, pt s/p 1L LR [KB]   2003 Pt consulted to internal medicine for stroke vs seizure workup. [KB]      ED Course User Index  [KB] Jeanne Torrez DO                 Clinical Impression:   Final diagnoses:  [I63.9] Stroke  [R41.82] AMS (altered mental status)        ED Disposition Condition    Admit Stable                Jeanne Torrez DO  Resident  08/04/23 3436

## 2023-08-05 NOTE — PROGRESS NOTES
Critical access hospital Medicine  Progress Note    Patient Name: Steff Johnson  MRN: [id number]  Patient Class: IP- Inpatient   Admission Date: 8/4/2023  Length of Stay: 1 days  Attending Physician: Nish Abarca MD  Primary Care Provider: Cristina Ren MD        Subjective:     Principal Problem:AMS (altered mental status)        HPI:  Steff Johnson is a 63 y.o. Black or  female   With PMH of stroke with expressive aphasia, right upper extremity paralysis, HTN, DM, COPD.    who presents with worsening AMS.      Patient was diagnosed with COVID a week ago. Patient currently not answering any questions, does not follow commands. Per discussion with ED provider and chart review.      Per her daughter, she  her upfrom physical rehab by her daughter today at approximately 1:00 p.m. and notice that she was not speaking as much as she normally does at home.  At baseline, per her daughter, the patient is able to speak in short phrases.  She does not complete sentences.  Patient arrives in the ED was brought back to the [facility] where she was noted to have a fixed left gaze and not be as interactive as she normally is.       In the ED, patient was hemodynamically stable, CT head and CTA head and neck was negative. Patient was loaded with Keppra. Neuro was consulted from the ED. Recommended admission for EEG and MRI brain.          Overview/Hospital Course:  08/05  Assumed care.Chart reviewed. Consultant's attendance/procedure noted and appreciated. SLP attendance noted/appreciated.  Labs reviewed and noted fully below: leukocytosis improving with stable normocytic anemia; normal cations with improving stage 3a renal dysfunction. Telemetry reviewed: sinus.  (boyfriend X 20 years) at bedside. Patient remains encephalopathic. Receiving levetiracetam, no further seizure like activity noted.  Plan: Continue current regimen/course; Neurology consultation pending; will start low  dose  dexamethasone, and Clinamix; AM labs for review.      Interval History: persisting encephlopathy    Review of Systems   Unable to perform ROS: Mental status change     Objective:     Vital Signs (Most Recent):  Temp: 97.9 °F (36.6 °C) (08/05/23 1110)  Pulse: 64 (08/05/23 1110)  Resp: 13 (08/05/23 1110)  BP: (!) 181/66 (08/05/23 1110)  SpO2: 98 % (08/05/23 1110) Vital Signs (24h Range):  Temp:  [97.8 °F (36.6 °C)-98.3 °F (36.8 °C)] 97.9 °F (36.6 °C)  Pulse:  [58-68] 64  Resp:  [10-21] 13  SpO2:  [95 %-99 %] 98 %  BP: (141-216)/(65-97) 181/66     Weight: 55 kg (121 lb 4.1 oz)  Body mass index is 24.49 kg/m².    Intake/Output Summary (Last 24 hours) at 8/5/2023 1301  Last data filed at 8/5/2023 0750  Gross per 24 hour   Intake 556.25 ml   Output 225 ml   Net 331.25 ml         Physical Exam  Vitals and nursing note reviewed.   Constitutional:       Appearance: She is well-developed.   HENT:      Head: Normocephalic and atraumatic.      Right Ear: External ear normal.      Left Ear: External ear normal.      Nose: Nose normal.   Eyes:      Conjunctiva/sclera: Conjunctivae normal.      Pupils: Pupils are equal, round, and reactive to light.   Cardiovascular:      Rate and Rhythm: Normal rate and regular rhythm.      Heart sounds: Normal heart sounds.   Pulmonary:      Effort: Pulmonary effort is normal.      Breath sounds: Normal breath sounds.   Abdominal:      General: Bowel sounds are normal.      Palpations: Abdomen is soft.   Musculoskeletal:         General: Normal range of motion.      Cervical back: Normal range of motion and neck supple.   Skin:     General: Skin is warm and dry.      Capillary Refill: Capillary refill takes less than 2 seconds.   Neurological:      Comments: Encephalopathic: briefly opens eyes to sternal rub, but no other response   Psychiatric:      Comments: Unable to assess             Significant Labs: All pertinent labs within the past 24 hours have been reviewed.  BMP:   Recent Labs    Lab 08/05/23  0408         K 4.3   *   CO2 20*   BUN 31*   CREATININE 1.2   CALCIUM 8.6*     CBC:   Recent Labs   Lab 08/04/23  1831 08/05/23  0408   WBC 16.23* 12.72*   HGB 10.7* 10.0*   HCT 30.7* 28.6*    315       Significant Imaging: I have reviewed all pertinent imaging results/findings within the past 24 hours.      Assessment/Plan:      No notes have been filed under this hospital service.  Service: Hospital Medicine    VTE Risk Mitigation (From admission, onward)         Ordered     IP VTE HIGH RISK PATIENT  Once         08/04/23 2233     Place sequential compression device  Until discontinued         08/04/23 2233                Discharge Planning   ALEX:      Code Status: Full Code   Is the patient medically ready for discharge?:     Reason for patient still in hospital (select all that apply): Patient trending condition, Laboratory test and Treatment                     Nish Abarca MD  Department of Hospital Medicine   [facility]

## 2023-08-05 NOTE — HOSPITAL COURSE
Pt got admitted with acute encephalopathy of unknown etiology  Pt was evaluated by Neurology team  MRI this time showed :Tiny subcortical white matter hyperintensity in the left parietal lobe near the vertex on diffusion-weighted imaging, suggesting small acute or subacute ischemic insult  Pt has history of  stroke with expressive aphasia, right upper extremity paralysis,and has cognitive defects  Pt was started on keppra for seizure disorder  Pts family opted for HH rather than SNF  Pt was discharged to home after 1 week of hospital stay  Ideally pt should be under hospice care

## 2023-08-05 NOTE — RESPIRATORY THERAPY
08/05/23 0715   Patient Assessment/Suction   Level of Consciousness (AVPU) alert   Respiratory Effort Normal;Unlabored   Expansion/Accessory Muscles/Retractions no retractions;no use of accessory muscles   Rhythm/Pattern, Respiratory depth regular;no shortness of breath reported;pattern regular;unlabored   PRE-TX-O2   Device (Oxygen Therapy) room air   SpO2 96 %   Pulse Oximetry Type Continuous   $ Pulse Oximetry - Multiple Charge Pulse Oximetry - Multiple   Pulse 61   Resp 13

## 2023-08-05 NOTE — HPI
Steff Johnson is a 63 y.o. Black or  female   With PMH of stroke with expressive aphasia, right upper extremity paralysis, HTN, DM, COPD.    who presents with worsening AMS.      Patient was diagnosed with COVID a week ago. Patient currently not answering any questions, does not follow commands. Per discussion with ED provider and chart review.      Per her daughter, she  her upfrom physical rehab by her daughter today at approximately 1:00 p.m. and notice that she was not speaking as much as she normally does at home.  At baseline, per her daughter, the patient is able to speak in short phrases.  She does not complete sentences.  Patient arrives in the ED was brought back to the [facility] where she was noted to have a fixed left gaze and not be as interactive as she normally is.       In the ED, patient was hemodynamically stable, CT head and CTA head and neck was negative. Patient was loaded with Keppra. Neuro was consulted from the ED. Recommended admission for EEG and MRI brain.

## 2023-08-05 NOTE — PLAN OF CARE
[facility]  Initial Discharge Assessment       Primary Care Provider: Cristina Ren MD    Admission Diagnosis: Stroke [I63.9]    Admission Date: 8/4/2023  Expected Discharge Date:     Pt is a 63-year-old female who arrived from home with AMS (altered mental status). The friend Mr. Nish Marti had to gloria the Pt back to the hospital after release.  The assessment was completed via telephone with Nish Marti and chart reviewed. The information on the face sheet has been verified as correct. Pt does not have a living will or advance directive. CM could not determine when PCP was last seen. Pt's family drives her to and from medical appointments. Pt is not on dialysis, or Coumadin, and she requires assistance with ADLs. Pt uses walker, Rollator, wheelchair, bedside commode. Pt takes medication as prescribed daily.  PT was hospitalized in the last 30 days. Leonie Johnson [phone] will assist the Pt home once discharged. CM will continue to monitor.    Transition of Care Barriers: None    Payor: [employer] MEDICARE / Plan: [employer] [facility] [place] SPECIAL NEEDS / Product Type: Medicare Advantage /     Extended Emergency Contact Information  Primary Emergency Contact: Leonie Johnson  Mobile Phone: [phone]  Relation: Daughter  Preferred language: English   needed? No  Secondary Emergency Contact: Nish Marti  [place] Phone: [phone]  Mobile Phone: [phone]  Relation: Friend   needed? No    Discharge Plan A: Home with family  Discharge Plan B: [facility] - [facility] [address] [facility] [facility]  [address] [facility] [address]  Phone: [phone] Fax: [phone]    [facility]  [address]  [facility] [address]  Phone: [phone] Fax: [phone]    [id number] - [place], [place] - [address] & [facility]  [address] [facility] [address]  Phone: [phone]  Fax: [phone]      Initial Assessment (most recent)       Adult Discharge Assessment - 08/05/23 1456          Discharge Assessment    Assessment Type Discharge Planning Assessment     Confirmed/corrected address, phone number and insurance Yes     Source of Information health record;family   Nish Marti/friend [phone]    Communicated ALEX with patient/caregiver Date not available/Unable to determine     Reason For Admission AMS (altered mental status)     People in Home friend(s)     Facility Arrived From: Home/ Rehab     Do you expect to return to your current living situation? Yes     Do you have help at home or someone to help you manage your care at home? Yes     Who are your caregiver(s) and their phone number(s)? Leonie Johnson/daughter [phone]     Prior to hospitilization cognitive status: Unable to Assess     Current cognitive status: Unable to Assess     Walking or Climbing Stairs ambulation difficulty, requires equipment     Home Accessibility wheelchair accessible     Equipment Currently Used at Home commode;cane, straight;rollator;walker, rolling;wheelchair     Readmission within 30 days? Yes     Patient currently being followed by outpatient case management? No     Do you currently have service(s) that help you manage your care at home? No     Do you take prescription medications? Yes     Do you have prescription coverage? Yes     Coverage Payor:  [employer] MANAGED MEDICARE - [facility] SPECIAL [facility]S     Do you have any problems affording any of your prescribed medications? No     Is the patient taking medications as prescribed? yes     Who is going to help you get home at discharge? Leonie Johnson/daughter [phone]     How do you get to doctors appointments? family or friend will provide     Are you on dialysis? No     Do you take coumadin? No     Discharge Plan A Home with family     Discharge Plan B Home Health     DME Needed Upon Discharge  none     Discharge Plan discussed with:  Spouse/sig other     Name(s) and Number(s) Nish Marti/Friend [phone]     Transition of Care Barriers None                              [facility]  Initial Discharge Assessment       Primary Care Provider: Cristina Ren MD    Admission Diagnosis: Stroke [I63.9]    Admission Date: 8/4/2023  Expected Discharge Date:     Transition of Care Barriers: None    Payor: HUMANA MANAGED MEDICARE / Plan: [employer] HMO PPO SPECIAL NEEDS / Product Type: Medicare Advantage /     Extended Emergency Contact Information  Primary Emergency Contact: Leonie Johnson  Mobile Phone: [phone]  Relation: Daughter  Preferred language: English   needed? No  Secondary Emergency Contact: Nish Marti  Home Phone: [phone]  Mobile Phone: [phone]  Relation: Friend   needed? No    Discharge Plan A: Home with family  Discharge Plan B: [facility] - [facility] [address] [facility]  [address] [facility] [address]  Phone: [phone] Fax: [phone]    [facility] [address] [address]  Phone: [phone] Fax: [phone]    [id number] [place], [place] - [address]  AT [facility] & [facility]  [address] [facility] [address]  Phone: [phone] Fax: [phone]      Initial Assessment (most recent)       Adult Discharge Assessment - 08/05/23 1456          Discharge Assessment    Assessment Type Discharge Planning Assessment     Confirmed/corrected address, phone number and insurance Yes     Source of Information health record;family   Nish Marti/darin [phone]    Communicated ALEX with patient/caregiver Date not available/Unable to determine     Reason For Admission AMS (altered mental status)     People in Home friend(s)     Facility Arrived From: Home/ Rehab     Do you expect to return to your current living situation? Yes     Do you have help at home or someone  to help you manage your care at home? Yes     Who are your caregiver(s) and their phone number(s)? Leonie Johnson/daughter [phone]     Prior to hospitilization cognitive status: Unable to Assess     Current cognitive status: Unable to Assess     Walking or Climbing Stairs ambulation difficulty, requires equipment     Home Accessibility wheelchair accessible     Equipment Currently Used at Home commode;cane, straight;rollator;walker, rolling;wheelchair     Readmission within 30 days? Yes     Patient currently being followed by outpatient case management? No     Do you currently have service(s) that help you manage your care at home? No     Do you take prescription medications? Yes     Do you have prescription coverage? Yes     Coverage Payor:  [employer] MANAGED MEDICARE - [facility]     Do you have any problems affording any of your prescribed medications? No     Is the patient taking medications as prescribed? yes     Who is going to help you get home at discharge? Leonie Jenkinsdaughter [phone]     How do you get to doctors appointments? family or friend will provide     Are you on dialysis? No     Do you take coumadin? No     Discharge Plan A Home with family     Discharge Plan B Home Health     DME Needed Upon Discharge  none     Discharge Plan discussed with: Spouse/sig other     Name(s) and Number(s) Nish Bridges/Friend [phone]     Transition of Care Barriers None

## 2023-08-05 NOTE — PLAN OF CARE
Problem: Infection  Goal: Absence of Infection Signs and Symptoms  Outcome: Ongoing, Not Progressing     Problem: Adult Inpatient Plan of Care  Goal: Plan of Care Review  Outcome: Ongoing, Not Progressing  Goal: Patient-Specific Goal (Individualized)  Outcome: Ongoing, Not Progressing  Goal: Absence of Hospital-Acquired Illness or Injury  Outcome: Ongoing, Not Progressing  Goal: Optimal Comfort and Wellbeing  Outcome: Ongoing, Not Progressing  Goal: Readiness for Transition of Care  Outcome: Ongoing, Not Progressing     Problem: Diabetes Comorbidity  Goal: Blood Glucose Level Within Targeted Range  Outcome: Ongoing, Not Progressing     Problem: Skin Injury Risk Increased  Goal: Skin Health and Integrity  Outcome: Ongoing, Not Progressing

## 2023-08-05 NOTE — CONSULTS
Former[facility]  Department of Neurology  Neurology Consultation Note        PATIENT NAME: Steff Johnson  MRN: [id number]  CSN: [id number]      TODAY'S DATE: 08/05/2023  ADMIT DATE: 8/4/2023                            CONSULTING PROVIDER: Geoffrey Roque MD  CONSULT REQUESTED BY: Nish Abarca MD      Reason for consult:  Altered mental status, seizure-like activity.       History obtained from chart review and  at bedside.    HPI per EMR: Steff Johnson is a 63 y.o. female with a history of  stroke with expressive aphasia, right upper extremity paralysis, HTN, DM, COPD.    who presents with worsening AMS.      Patient was diagnosed with COVID a week ago. Patient currently not answering any questions, does not follow commands. Per discussion with ED provider and chart review.      Per her daughter, she  her upfrom physical rehab by her daughter today at approximately 1:00 p.m. and notice that she was not speaking as much as she normally does at home.  At baseline, per her daughter, the patient is able to speak in short phrases.  She does not complete sentences.  Patient arrives in the ED was brought back to the [facility] where she was noted to have a fixed left gaze and not be as interactive as she normally is.       In the ED, patient was hemodynamically stable, CT head and CTA head and neck was negative. Patient was loaded with Keppra in ER with concern for seizures.  Admitted to the hospital for further workup.    Neurology consult:  Patient was seen examined by me.  She tracks however does not answer to questions or follow commands.  Patient's  is at bedside states that he has been like this since her recent admission to the hospital and only would make few words which has also been decreased in the last few days for which he brought to the hospital.  He states that mental status has been worse in the last few days and he noticed that yesterday she was not speaking at  all.    In the ER, she was noticed to have fixed left gaze and not as interactive for which a stat EEG was done showed no seizures.  CT head was done showed no acute intracranial pathology and CT angiogram head and neck showed no large vessel occlusion/high-grade stenosis.    She was recently admitted to the hospital in July 2023 for generalized weakness, dysarthria which is worsened from baseline.  She had an MRI brain at that time showed a small right frontal acute ischemic stroke.  She was discharged home on dual antiplatelet therapy per Neurology recommendations at that time.  Her mental status at that time was awake and oriented x4 with speech being dysarthric and making only few words at a time.  Her examined now seems to be significantly different compared to her prior neurology encounter.     PREVIOUS MEDICAL HISTORY:  Past Medical History:   Diagnosis Date    Arthritis     Complete tear of left rotator cuff 11/09/2017    COPD (chronic obstructive pulmonary disease)     COVID-19 infection 07/02/2021    Diabetes mellitus     H/o Cerebrovascular accident (CVA) of left pontine structure 12/12/2019    Hypertension     Personal history of colonic polyps     Stroke     Wears glasses      PREVIOUS SURGICAL HISTORY:  Past Surgical History:   Procedure Laterality Date    COLONOSCOPY N/A 4/11/2017    Procedure: COLONOSCOPY;  Surgeon: Jared Antonio MD;  Location: [facility];  Service: Endoscopy;  Laterality: N/A;    HYSTERECTOMY      OOPHORECTOMY      SHOULDER SURGERY      R    TRANSESOPHAGEAL ECHOCARDIOGRAM WITH POSSIBLE CARDIOVERSION (GT W/ POSS CARDIOVERSION) N/A 5/4/2023    Procedure: Transesophageal echo (GT) intra-procedure log documentation;  Surgeon: Blade Phillip MD;  Location: [facility] CATH/EP LAB;  Service: Cardiology;  Laterality: N/A;     FAMILY MEDICAL HISTORY:  Family History   Problem Relation Age of Onset    Diabetes Mother     Asthma Mother     Hypertension Mother     Diabetes Father     Diabetes  Brother     Hypertension Brother     Anemia Daughter     Glaucoma Neg Hx     Macular degeneration Neg Hx     Retinal detachment Neg Hx      SOCIAL HISTORY:  Social History     Tobacco Use    Smoking status: Never    Smokeless tobacco: Never   Substance Use Topics    Alcohol use: No    Drug use: No     ALLERGIES:  Review of patient's allergies indicates:  No Known Allergies  HOME MEDICATIONS:  Prior to Admission medications    Medication Sig Start Date End Date Taking? Authorizing Provider   amLODIPine (NORVASC) 10 MG tablet Take 1 tablet (10 mg total) by mouth once daily. 6/6/23 6/5/24 Yes Cristina Ren MD   aspirin 81 MG Chew Take 1 tablet (81 mg total) by mouth once daily. 7/8/23 8/7/23 Yes Juan Alberto Iqbal MD   atorvastatin (LIPITOR) 80 MG tablet Take 1 tablet (80 mg total) by mouth every evening. 5/5/23 8/4/23 Yes Vilma Rodriguez MD   dantrolene (DANTRIUM) 50 MG Cap Take 50 mg by mouth 3 (three) times daily. 8/1/23  Yes Provider, Historical   EScitalopram oxalate (LEXAPRO) 10 MG tablet Take 10 mg by mouth every evening. 7/28/23 10/26/23 Yes Provider, Historical   insulin glargine (LANTUS) 100 unit/mL injection Inject 13 Units into the skin every evening. 7/28/23 9/26/23 Yes Provider, Historical   insulin lispro 100 unit/mL injection Inject 5 Units into the skin 3 (three) times daily. 7/28/23  Yes Provider, Historical   latanoprost 0.005 % ophthalmic solution Place 1 drop into both eyes once daily. 6/23/23  Yes Heraclio Ambrose MD   losartan (COZAAR) 100 MG tablet Take 1 tablet (100 mg total) by mouth once daily. 6/6/23 6/5/24 Yes Cristina Ren MD   metoprolol succinate (TOPROL-XL) 100 MG 24 hr tablet Take 1 tablet (100 mg total) by mouth once daily.  Patient taking differently: Take 50 mg by mouth once daily. 2/16/23 8/4/23 Yes Cristina Ren MD   minoxidiL (LONITEN) 10 MG Tab Take 10 mg by mouth 2 (two) times daily. 8/4/23  Yes Provider, Historical   modafiniL (PROVIGIL) 200 MG Tab Take 200 mg by  mouth once daily. 7/28/23 8/27/23 Yes Provider, Historical   umeclidinium (INCRUSE ELLIPTA) 62.5 mcg/actuation inhalation capsule Inhale 62.5 mcg into the lungs once daily. 7/29/23 10/27/23 Yes Provider, Historical   cloNIDine (CATAPRES) 0.1 MG tablet Take 1 tablet (0.1 mg total) by mouth 3 (three) times daily. 7/5/23 7/4/24  Darryl Riggs MD   clopidogreL (PLAVIX) 75 mg tablet Take 1 tablet (75 mg total) by mouth once daily. 7/6/23 7/5/24  Darryl Riggs MD   hydrALAZINE (APRESOLINE) 100 MG tablet Take 1 tablet (100 mg total) by mouth 3 (three) times daily. 2/16/23 2/16/24  Cristina Ren MD   insulin aspart U-100 (NOVOLOG) 100 unit/mL (3 mL) InPn pen Inject 5 Units into the skin 3 (three) times daily. 7/6/23 7/5/24  Darryl Riggs MD   insulin detemir U-100, Levemir, 100 unit/mL (3 mL) SubQ InPn pen Inject 20 Units into the skin every evening. 7/6/23 7/5/24  Darryl Riggs MD   tiotropium (SPIRIVA) 18 mcg inhalation capsule Inhale 1 capsule (18 mcg total) into the lungs once daily. Controller 2/16/23 2/16/24  Cristina Ren MD   blood-glucose meter (TRUE METRIX GLUCOSE METER) kit Use as instructed 10/20/21 5/18/22  Gabriel Moran, NP   lancing device (TRUEDRAW LANCING DEVICE) Misc Inject 1 Device into the skin 2 (two) times daily. 3/31/22 5/18/22  Gabriel Moran, SHWETA     CURRENT SCHEDULED MEDICATIONS:   amLODIPine  10 mg Oral Daily    aspirin  81 mg Oral Daily    atorvastatin  80 mg Oral QHS    clopidogreL  75 mg Oral Daily    dantrolene  50 mg Oral TID    EScitalopram oxalate  10 mg Oral QHS    insulin detemir U-100 (Levemir)  20 Units Subcutaneous QHS    levETIRAcetam (Keppra) IV (PEDS and ADULTS)  750 mg Intravenous Q12H    metoprolol succinate  100 mg Oral Daily    modafiniL  200 mg Oral Daily    mupirocin   Nasal BID     CURRENT INFUSIONS:   sodium chloride 0.9% 75 mL/hr at 08/04/23 1857     CURRENT PRN MEDICATIONS:  dextrose 50%, glucagon (human recombinant), insulin aspart U-100,  "melatonin, sodium chloride 0.9%    REVIEW OF SYSTEMS:  A review of systems cannot be obtained as the patient is unable to respond to questions appropriately.       PHYSICAL EXAM:  Patient Vitals for the past 24 hrs:   BP Temp Temp src Pulse Resp SpO2 Height Weight   08/05/23 0715 -- -- -- 61 13 96 % -- --   08/05/23 0701 (!) 177/83 98 °F (36.7 °C) -- 61 10 97 % -- --   08/05/23 0604 (!) 167/73 -- -- 60 17 98 % -- --   08/05/23 0500 (!) 176/80 -- -- 60 12 96 % -- --   08/05/23 0400 (!) 141/65 -- -- (!) 58 13 97 % -- --   08/05/23 0300 (!) 177/78 97.8 °F (36.6 °C) Axillary 61 11 97 % 4' 11" (1.499 m) 55 kg (121 lb 4.1 oz)   08/05/23 0115 -- 97.8 °F (36.6 °C) Axillary -- -- -- -- --   08/05/23 0030 (!) 162/72 -- -- 62 -- 97 % -- --   08/05/23 0024 (!) 167/70 -- -- -- -- -- -- --   08/05/23 0001 (!) 196/88 -- -- 67 -- 99 % -- --   08/04/23 2335 (!) 198/66 -- -- (!) 58 (!) 21 96 % -- --   08/04/23 2330 (!) 216/97 -- -- (!) 59 13 98 % -- --   08/04/23 2300 (!) 180/86 -- -- (!) 59 12 98 % -- --   08/04/23 2230 (!) 156/81 -- -- 60 14 97 % -- --   08/04/23 2200 (!) 214/97 -- -- 64 14 98 % -- --   08/04/23 2130 (!) 207/93 -- -- 63 14 96 % -- --   08/04/23 2100 (!) 181/84 -- -- 65 12 96 % -- --   08/04/23 2050 (!) 173/81 -- -- 65 13 96 % -- --   08/04/23 2045 (!) 196/96 -- -- 68 15 96 % -- --   08/04/23 2000 (!) 177/95 -- -- 68 18 97 % -- --   08/04/23 1930 (!) 186/84 -- -- 66 14 96 % -- --   08/04/23 1925 (!) 178/84 -- -- 67 15 96 % -- --   08/04/23 1815 (!) 154/70 98.3 °F (36.8 °C) Oral 64 14 95 % 4' 11" (1.499 m) 59 kg (130 lb)       GENERAL APPEARANCE: Alert, well-developed, well-nourished female in no acute distress.  HEENT: Normocephalic and atraumatic. PERRL. Oropharynx unremarkable.  PULM: Normal respiratory effort. No accessory muscle use.  CV: RRR.  ABDOMEN: Soft, nontender.  EXTREMITIES: No obvious signs of vascular compromise. Pulses present. No cyanosis, clubbing or edema.  SKIN: Clear; no rashes, lesions or skin " "breaks in exposed areas.    NEURO:  MENTAL STATUS:  She is awake, able to track however does not talk or follow commands.    CRANIAL NERVES:  Pupils equal round and reactive to light.  Extraocular movements are intact, face is with mild right facial droop    MOTOR:  Bulk normal. Tone normal and symmetric throughout.  Abnormal movements absent.  Tremor: none present.  Strength could not be tested.  Moves all extremities to stimulus left greater than right    REFLEXES:  DTRs 1+ throughout.  Plantar response downgoing bilaterally.  SENSATION: not tested.  COORDINATION:  Could not be tested .  STATION: not tested.  GAIT: not tested.      Labs:  Recent Labs   Lab 08/04/23 1831 08/05/23  0408    144   K 5.0 4.3   * 116*   CO2 22* 20*   BUN 37* 31*   CREATININE 1.6* 1.2   * 106   CALCIUM 8.9 8.6*     Recent Labs   Lab 08/04/23 1831 08/05/23  0408   WBC 16.23* 12.72*   HGB 10.7* 10.0*   HCT 30.7* 28.6*    315     Recent Labs   Lab 08/04/23 1831   ALBUMIN 3.6   PROT 7.8   BILITOT 0.3   ALKPHOS 115   ALT 44   AST 34     Lab Results   Component Value Date    INR 1.0 08/04/2023     Lab Results   Component Value Date    TRIG 62 08/04/2023    HDL 38 (L) 08/04/2023    CHOLHDL 41.3 08/04/2023     Lab Results   Component Value Date    HGBA1C 6.4 (H) 08/05/2023     No results found for: "PROTEINCSF", "GLUCCSF", "WBCCSF"    Imaging:  I have reviewed and interpreted the pertinent imaging and lab results.      US Carotid Bilateral  PROCEDURE:    US CAROTID DOPPLER BILATERAL  dated  8/4/2023 7:16 PM CDT    CLINICAL HISTORY:   Female 63 years of age.    TECHNIQUE:  Multiple sagittal and transverse images of the carotid arterial system of the neck were obtained.  2-D vascular structure, Doppler spectral analysis and color flow Doppler imaging was performed.  All measurements and percent stenoses described below were determined using NASCET criteria or criteria similar to NASCET, as defined by the Society of " Radiologist in Ultrasound Consensus Conference Radiology 2003.    PREVIOUS STUDIES:  None Available    FINDINGS:    Right carotid system:  There is no plaque. The peak systolic velocity in the right CCA is 96 cm/sec.  The peak systolic velocity within the internal carotid artery is 81 cm/sec.  The ICA/CCA ratio is calculated as 0.8.  The external carotid artery is patent with antegrade flow. Antegrade flow is present in the vertebral artery.    Left carotid system:  There is no plaque.  The peak systolic velocity in the left CCA is 110 cm/sec.  The peak systolic velocity within the internal carotid artery is 76 cm/sec.  The ICA/CCA ratio is calculated as 0.7.  The external carotid artery is patent with antegrade flow. Antegrade flow is present in the vertebral artery.    IMPRESSION:    Normal bilateral carotid ultrasound.    Electronically signed by:  Brooke Jade MD  8/4/2023 8:42 PM CDT Workstation: [id number]  CT Head Without Contrast   ADDENDUM #1     This report contains critical findings that may be critical to patient care.    At 6:47 PM CST on 8/4/2023 , the report findings were confirmed with Dr. Torrez has received exam report, is aware of the finding(s), and indicated that a call was not necessary to discuss exam findings.    Electronically signed by:  Fozia Le DO  8/4/2023 7:36 PM CDT Workstation: [id number]     ORIGINAL REPORT     CT of the head: 8/4/2023 6:40 PM CDT    HISTORY: 63 years  old Female with Neuro deficit, acute, stroke suspected. Patient found unresponsive, altered mental status    COMPARISON: 7/3/2023    TECHNIQUE: Multiple axial contiguous images were obtained through the head without intravenous contrast administered. This exam was performed according to our departmental dose-optimization program, which includes automated exposure control, adjustment of the mA and/or KV according to the patient's size and/or use of iterative reconstruction technique.    FINDINGS: The ventricles  and cerebral sulci demonstrate mild prominence, consistent with cerebral atrophy. There are moderate periventricular hypodensities, suggestive of periventricular white matter changes.    There are similar hypodensities at the left frontal lobe and left basal ganglia.    The gray-white matter differentiation is within normal limits.    Both globes appear symmetric.    The mastoid air cells appear clear.    The visualized paranasal sinuses appear clear.    The calvarium is intact.    No extra-axial fluid collection is seen.    No midline shift or mass effect is apparent.    There are no findings to suggest acute intracranial hemorrhage.    IMPRESSION: 1. No acute intracranial hemorrhage is seen.  2. There is mild to moderate cerebral atrophy and periventricular white matter changes are seen.    Electronically signed by:  Fozia Le DO  8/4/2023 6:41 PM CDT Workstation: [id number]  X-Ray Chest AP Portable  History: Stroke.    FINDINGS: AP view of the chest is compared to previous study dated July 29, 2023. No focal infiltrate or pleural effusion is seen. Cardiac silhouette is enlarged. Bony thorax appears intact.    IMPRESSION:  1. No acute cardiopulmonary change seen.    Electronically signed by:  Rj Kellogg MD  8/4/2023 7:09 PM CDT Workstation: [id number]  CTA Head and Neck (xpd)  CMS MANDATED QUALITY DATA - CT RADIATION - 436    All CT scans at this facility use dose modulation, iterative-reconstruction, and/or weight-based dosing when appropriate to reduce radiation dose to as low as reasonably achievable.    CMS MANDATED QUALITY [id number]    NASCET CRITERIA WAS UTILIZED FOR ESTIMATION OF STENOSIS SEVERITY WITH THE NARROWEST SEGMENT BEING COMPARED TO THE DISTAL LUMINAL DIAMETER.    HISTORY:Stroke/TIA. Determined embolic source.    TECHNIQUE: Serial axial images were obtained from aortic arch through the vertex with 100 cc of Omnipaque 350 intravenous contrast utilizing a CT angiography protocol.  Coronal and sagittal MIP CT angiography reconstructions were performed. Patient received approximately 158 mGy-cm of radiation exposure from this exam.    COMPARISON: Comparison to previous study dated July 4, 2023.    FINDINGS:Aortic arch is unremarkable. Great vessel origins appear widely patent. Ectatic patent cervical carotid arteries are visualized. No evidence of significant cervical carotid artery stenosis is seen. Minimal atherosclerotic changes are present at the left cervical carotid bifurcation. Codominant patent vertebral arteries are visualized.    Internal carotid arteries appear widely patent at the skull base. Codominant vertebral arteries are present at the skull base with a widely patent basilar artery. Bilateral anterior, middle, and posterior cerebral arteries appear patent without evidence of significant stenosis. Patent bilateral posterior communicating arteries are identified.    IMPRESSION:  1. No evidence of cervical carotid/vertebral artery occlusion or significant stenosis.  2. No evidence of large vessel intracranial arterial occlusion or significant stenosis is seen.    Electronically signed by:  Rj Kellogg MD  8/4/2023 6:55 PM CDT Workstation: [id number]         ASSESSMENT & PLAN:      Encephalopathy  Prior stroke  R/o Seizure  COVID infection    Plan:   Admitted to the hospital with encephalopathy and unable to talk for the past few days with worsening in the last 1 day.  Etiology is unknown however need to rule out acute CVA versus seizure with postictal state versus metabolic/infectious causes of encephalopathy-COVID.  CT head and CT angiogram head and neck done no significant acute abnormalities.  MRI brain ordered and pending.   Stat EEG was done showed no seizures or  Epileptiform activity.  She was started on Keppra in the ER with concern for seizures as a cause of her mental status.  At this time would continue Keppra 500 mg b.i.d. until further workup is done.  Aspirin,  Lipitor for stroke prevention.  Will hold off on Plavix as patient does microhemorrhages and dual antiplatelet therapy increases risk of intracranial hemorrhage.  Physical and occupational therapy evaluate and treat.  Neuro checks per unit protocol.  Workup/management for metabolic/infectious causes of encephalopathy per primary team.  Will follow              Thank you kindly for including us in the care of this patient. Please do not hesitate to contact us with any questions.        Geoffrey Roque MD  Neurology/vascular Neurology  Date of Service: 08/05/2023  10:38 AM    --------------------------------------------------------------------------------------------------------------------------------------------------------------------------------------------------------------------------------------------------------------  Please note: This note was transcribed using voice recognition software. Because of this technology there are often uinintended grammatical, spelling, and other transcription errors. Please disregard these errors.

## 2023-08-05 NOTE — PLAN OF CARE
Problem: SLP  Goal: SLP Goal  Description:   1. Pt will participate in ongoing assessment of communication and swallow.  2. Pt will tolerate a small ice chip in her mouth without adverse response/turning away (allowing placement of ice chip).  3.  Pt will produce voice with model x 20% of trials.  4.  Pt will respond to yes/no questions with 60% accuracy via any modality.    Outcome: Ongoing, Progressing

## 2023-08-05 NOTE — PT/OT/SLP EVAL
Speech Language Pathology Evaluation  Cognitive/Bedside Swallow    Patient Name:  Steff Johnson   MRN:  [id number]  Admitting Diagnosis: AMS (altered mental status)    Recommendations:                  General Recommendations:  ongoing assessment of swallow and communication; temporary alternative nutrition/hydration method  Diet recommendations:  NPO, NPO ; consider temporary alternative method of hydration and nutrition  Aspiration Precautions:  nothing by mouth at this time; cautious, frequent oral care with nursing using swabs ; may benefit from intermittent oral suction if pt will allow   General Precautions: Standard, aspiration, NPO, fall, droplet, contact, aphasia, airborne, other (see comments), respiratory (Covid 19)  Communication strategies:  pt occasionally responds via head movement to yes/no questions; response not reliable at this time; no speech produced during this visit    Assessment:     Steff Johnson is a 63 y.o. female with an SLP diagnosis of Aphasia and Dysphagia; can't rule out oral and verbal apraxia and dysarthria.  She was awake/alert but not able to produce speech during this visit. She was not able to move oral musculature functionally or produce voice.   Current level of communication impairment is judged severe (expressive and receptive).  Pt is not safe for oral intake of any type at this time; recommend consideration of temporary alternative nutrition/hydration. Speech pathology to follow for communication/cognition/swallow. Discussed with nursing.    History:     Hx including stroke with right side weakness; chart review reveals history of dysarthria, cognitive impairment, dysphonia, aphasia.  Pt was recently receiving outpatient therapies.  Per report of  and chart review pt was able to speak (short phrases per H&P).  Pt brought to ED by family due to change in status including decreased speech.      Past Medical History:   Diagnosis Date    Arthritis     Complete tear of  left rotator cuff 11/09/2017    COPD (chronic obstructive pulmonary disease)     COVID-19 infection 07/02/2021    Diabetes mellitus     H/o Cerebrovascular accident (CVA) of left pontine structure 12/12/2019    Hypertension     Personal history of colonic polyps     Stroke     Wears glasses        Past Surgical History:   Procedure Laterality Date    COLONOSCOPY N/A 4/11/2017    Procedure: COLONOSCOPY;  Surgeon: Jared Antonio MD;  Location: [facility] ENDO;  Service: Endoscopy;  Laterality: N/A;    HYSTERECTOMY      OOPHORECTOMY      SHOULDER SURGERY      R    TRANSESOPHAGEAL ECHOCARDIOGRAM WITH POSSIBLE CARDIOVERSION (GT W/ POSS CARDIOVERSION) N/A 5/4/2023    Procedure: Transesophageal echo (GT) intra-procedure log documentation;  Surgeon: Blade Phillip MD;  Location: [facility] CATH/EP LAB;  Service: Cardiology;  Laterality: N/A;       Social History: Patient lives with family.    Prior Intubation HX:  not associated with this admission    Modified Barium Swallow: none located in recent [facility] records; pt not appropriate for MBSS (modified barium swallow study) today    Chest X-Rays: reviewed    Prior diet: per  pt was drinking thin liquid and eating vegetables.    Occupation/hobbies/homemaking: deferred due to communication impairment.    Subjective     No speech; eyes open; not tracking but initially made eye contact with clinician (pt lying on her right side, looking forward (appeared to be looking at clinician).  Patient goals: not able to state goals     Pain/Comfort:  Pain Rating 1: 0/10    Respiratory Status: Room air    Objective:     Cognitive Status:  Impaired -- severity judgement deferred due to expressive communication deficits  No speech; alert.  Not able to state name;  Demonstrated yes/no response (head shake or nod) in 50% of opportunities (accuracy of response 50%);  Not demonstrating understanding of body commands.       Receptive Language: Impaired -- severity unclear due to expressive  deficits  Demonstrated yes/no response via gesture (head movement) in 50% trials; frequently non-responsive;  Not able to imitate sounds or gestures or words.  Not following one-unit commands.    Pragmatics:  Impaired (severe)  No speech; inconsistent eye contact; flat affect.      Expressive Language: Impaired (severe)  Not producing speech for any purpose.  Not answering questions, imitating or naming objects.  Not imitating gesture with left  hand.      Motor Speech:  Impaired--severe -- apraxia vs dysarthria      Voice: voice noted x 1 very faintly (multiple opportunities/cues to voice)      Visual-Spatial:not tracking; not pointing to locate objects in room      Reading: deferred      Written Expression: deferred      Oral Musculature Evaluation  Oral Musculature: other (see comments) (weakness vs apraxia)  Dentition: other (see comments) (unable to perform full intra oral exam due to pt adversion/keeping mouth shut; some dentition visible)  Secretion Management: problems swallowing secretions, other (see comments) (no swallow demonstrated, spontaneous or volitional during visit)  Mucosal Quality: other (see comments) (lips dry, anterior oral cavity visible to clinician appears moist)  Mandibular Strength and Mobility: impaired  Lingual Strength and Mobility: other (see comments) (not following instructions for evaluation)  Velar Elevation: other (see comments) (velum not visualized)  Buccal Strength and Mobility: impaired, other (see comments) (apraxia vs weakness/cognitive impairment; not following instructions)  Volitional Cough: not demonstrated  Volitional Swallow: not demonstrated  Voice Prior to PO Intake: no functional voice produced -- no oral intake -- not appropriate    Bedside Swallow Eval:   Consistencies Assessed:    No direct assessment due to risk and aversion responses.    Turning away from ice pressed to lower lip; not opening mouth for ice chip, spoon; pt does not demonstrate oral motor  control    Oral Phase:   Not opening mouth for spoon/straw -- no food items placed in mouth    Pharyngeal Phase:   Pt cued to demonstrate dry swallow and volitional cough -- not demonstrated  No swallow demonstrated, volitional or spontaneous, during this visit    Compensatory Strategies  Not applicable at this time    Treatment: clinician provided education for pt/spouse regarding results of evaluation; pt showed no sign of understanding    Goals:   Multidisciplinary Problems       SLP Goals          Problem: SLP    Goal Priority Disciplines Outcome   SLP Goal     SLP Ongoing, Progressing   Description: 1. Pt will participate in ongoing assessment of communication and swallow.  2. Pt will tolerate a small ice chip in her mouth without adverse response/turning away (allowing placement of ice chip).  3.  Pt will produce voice with model x 20% of trials.  4.  Pt will respond to yes/no questions with 60% accuracy via any modality.                         Plan:     Patient to be seen:  5 x/week   Plan of Care expires:  08/26/23  Plan of Care reviewed with:  patient, spouse, other (see comments) (nurse)   SLP Follow-Up:  Yes       Discharge recommendations:  Discharge Facility/Level of Care Needs: other (see comments) (to be determined)   Barriers to Discharge:   nutrition    Time Tracking:     SLP Treatment Date:   08/05/23  Speech Start Time:  1110  Speech Stop Time:  1130     Speech Total Time (min):  20 min    Billable Minutes: Eval 12  and Eval Swallow and Oral Function 8    08/05/2023

## 2023-08-05 NOTE — H&P
Pending sale to [facility]   History & Physical   Patient Name: Steff Johnson  MRN: [id number]  Admission Date: 8/4/2023  6:11 PM  Attending Physician: Juan Connor MD  Primary Care Provider: Cristina Ren MD  Face-to-Face encounter date: 08/04/2023    Patient information was obtained from patient, past medical records, ER physician, and ER records.     HISTORY OF PRESENT ILLNESS:     Steff Johnson is a 63 y.o. Black or  female   With PMH of stroke with expressive aphasia, right upper extremity paralysis, HTN, DM, COPD.    who presents with worsening AMS.     Patient was diagnosed with COVID a week ago. Patient currently not answering any questions, does not follow commands. Per discussion with ED provider and chart review.     Per her daughter, she  her upfrom physical rehab by her daughter today at approximately 1:00 p.m. and notice that she was not speaking as much as she normally does at home.  At baseline, per her daughter, the patient is able to speak in short phrases.  She does not complete sentences.  Patient arrives in the ED was brought back to the [facility] where she was noted to have a fixed left gaze and not be as interactive as she normally is.      In the ED, patient was hemodynamically stable, CT head and CTA head and neck was negative. Patient was loaded with Keppra. Neuro was consulted from the ED. Recommended admission for EEG and MRI brain.     REVIEW OF SYSTEMS:     All systems reviewed and are negative except as noted per above.    PAST MEDICAL HISTORY:     Past Medical History:   Diagnosis Date    Arthritis     Complete tear of left rotator cuff 11/09/2017    COPD (chronic obstructive pulmonary disease)     COVID-19 infection 07/02/2021    Diabetes mellitus     H/o Cerebrovascular accident (CVA) of left pontine structure 12/12/2019    Hypertension     Personal history of colonic polyps     Stroke     Wears glasses        PAST SURGICAL HISTORY:      Past Surgical History:   Procedure Laterality Date    COLONOSCOPY N/A 4/11/2017    Procedure: COLONOSCOPY;  Surgeon: Jared Antonio MD;  Location: [facility];  Service: Endoscopy;  Laterality: N/A;    HYSTERECTOMY      OOPHORECTOMY      SHOULDER SURGERY      R    TRANSESOPHAGEAL ECHOCARDIOGRAM WITH POSSIBLE CARDIOVERSION (GT W/ POSS CARDIOVERSION) N/A 5/4/2023    Procedure: Transesophageal echo (GT) intra-procedure log documentation;  Surgeon: Blade Phillip MD;  Location: [facility] CATH/EP LAB;  Service: Cardiology;  Laterality: N/A;       ALLERGIES:   Patient has no known allergies.    FAMILY HISTORY:     Family History   Problem Relation Age of Onset    Diabetes Mother     Asthma Mother     Hypertension Mother     Diabetes Father     Diabetes Brother     Hypertension Brother     Anemia Daughter     Glaucoma Neg Hx     Macular degeneration Neg Hx     Retinal detachment Neg Hx        SOCIAL HISTORY:     Social History     Tobacco Use    Smoking status: Never    Smokeless tobacco: Never   Substance Use Topics    Alcohol use: No        Social History     Substance and Sexual Activity   Sexual Activity Not Currently        HOME MEDICATIONS:     Prior to Admission medications    Medication Sig Start Date End Date Taking? Authorizing Provider   EScitalopram oxalate (LEXAPRO) 10 MG tablet Take 10 mg by mouth every evening. 7/28/23 10/26/23 Yes Provider, Historical   insulin glargine (LANTUS) 100 unit/mL injection Inject 13 Units into the skin every evening. 7/28/23 9/26/23 Yes Provider, Historical   insulin lispro 100 unit/mL injection Inject 5 Units into the skin 3 (three) times daily. 7/28/23  Yes Provider, Historical   modafiniL (PROVIGIL) 200 MG Tab Take 200 mg by mouth once daily. 7/28/23 8/27/23 Yes Provider, Historical   umeclidinium (INCRUSE ELLIPTA) 62.5 mcg/actuation inhalation capsule Inhale 62.5 mcg into the lungs once daily. 7/29/23 10/27/23 Yes Provider, Historical   amLODIPine (NORVASC) 10 MG tablet  Take 1 tablet (10 mg total) by mouth once daily. 6/6/23 6/5/24  Cristina Ren MD   aspirin 81 MG Chew Take 1 tablet (81 mg total) by mouth once daily. 7/8/23 8/7/23  Juan Alberto Iqbal MD   atorvastatin (LIPITOR) 80 MG tablet Take 1 tablet (80 mg total) by mouth every evening. 5/5/23 6/15/23  Vilma Rodriguez MD   cloNIDine (CATAPRES) 0.1 MG tablet Take 1 tablet (0.1 mg total) by mouth 3 (three) times daily. 7/5/23 7/4/24  Darryl Riggs MD   clopidogreL (PLAVIX) 75 mg tablet Take 1 tablet (75 mg total) by mouth once daily. 7/6/23 7/5/24  Darryl Riggs MD   dantrolene (DANTRIUM) 50 MG Cap Take 50 mg by mouth 3 (three) times daily. 8/1/23   Leeanne Jiménez   hydrALAZINE (APRESOLINE) 100 MG tablet Take 1 tablet (100 mg total) by mouth 3 (three) times daily. 2/16/23 2/16/24  Cristina Ren MD   insulin aspart U-100 (NOVOLOG) 100 unit/mL (3 mL) InPn pen Inject 5 Units into the skin 3 (three) times daily. 7/6/23 7/5/24  Darryl Riggs MD   insulin detemir U-100, Levemir, 100 unit/mL (3 mL) SubQ InPn pen Inject 20 Units into the skin every evening. 7/6/23 7/5/24  Darryl Riggs MD   latanoprost 0.005 % ophthalmic solution Place 1 drop into both eyes once daily. 6/23/23   Heraclio Ambrose MD   losartan (COZAAR) 100 MG tablet Take 1 tablet (100 mg total) by mouth once daily. 6/6/23 6/5/24  Cristina Ren MD   metoprolol succinate (TOPROL-XL) 100 MG 24 hr tablet Take 1 tablet (100 mg total) by mouth once daily. 2/16/23 6/15/23  Cristina Ren MD   minoxidiL (LONITEN) 10 MG Tab Take 10 mg by mouth 2 (two) times daily. 8/4/23   Provider, Historical   tiotropium (SPIRIVA) 18 mcg inhalation capsule Inhale 1 capsule (18 mcg total) into the lungs once daily. Controller 2/16/23 2/16/24  Cristina Ren MD   blood-glucose meter (TRUE METRIX GLUCOSE METER) kit Use as instructed 10/20/21 5/18/22  Gabriel Moran, NP   lancing device (TRUEDRAW LANCING DEVICE) Misc Inject 1 Device into the skin 2 (two)  "times daily. 3/31/22 5/18/22  Gabriel Moran, NP       PHYSICAL EXAM:     BP (!) 196/96   Pulse 68   Temp 98.3 °F (36.8 °C) (Oral)   Resp 15   Ht 4' 11" (1.499 m)   Wt 59 kg (130 lb)   SpO2 96%   BMI 26.26 kg/m²     Gen: sleeping, wakes up for touch, nonverbal   HEENT: Eyes with no icterus, not injected.  External ears with no lesions  Nares patent  Mouth moist mucous membranes  CV: Regular rate and rhythm, no murmurs, no edema.  Capillary refill < 2 seconds.  Lungs:  Clear to auscultation bilaterally, no tachypnea or increased work of breathing.  Abdomen:  Soft with active bowel sounds, no tenderness to palpation, no distention.  Musculoskeletal:  Moves all extremities with good strength.  No clubbing.  Skin:  Warm and dry, no obvious wounds or rashes.    Neuro:  right upper extremity contracted. Patient open eyes to touch, does not follow any commands. Withdraw from pain. Fixed gaze to left. Non verbal   Psych:  Calm and cooperative, awake alert and oriented.    LABS AND IMAGING:     Labs Reviewed   CBC W/ AUTO DIFFERENTIAL - Abnormal; Notable for the following components:       Result Value    WBC 16.23 (*)     RBC 3.77 (*)     Hemoglobin 10.7 (*)     Hematocrit 30.7 (*)     MCV 81 (*)     RDW 15.2 (*)     Immature Granulocytes 0.8 (*)     Gran # (ANC) 13.7 (*)     Immature Grans (Abs) 0.13 (*)     Mono # 1.1 (*)     Gran % 84.1 (*)     Lymph % 8.1 (*)     All other components within normal limits   COMPREHENSIVE METABOLIC PANEL - Abnormal; Notable for the following components:    Chloride 115 (*)     CO2 22 (*)     Glucose 141 (*)     BUN 37 (*)     Creatinine 1.6 (*)     eGFR 35.6 (*)     Anion Gap 5 (*)     All other components within normal limits   LIPID PANEL - Abnormal; Notable for the following components:    Cholesterol 92 (*)     HDL 38 (*)     LDL Cholesterol 41.6 (*)     All other components within normal limits   URINALYSIS - Abnormal; Notable for the following components:    Specific " Gravity, UA >1.030 (*)     Protein, UA 1+ (*)     Ketones, UA Trace (*)     All other components within normal limits    Narrative:     Collection Type->Urine, Clean Catch   SARS-COV-2 RNA AMPLIFICATION, QUAL - Abnormal; Notable for the following components:    SARS-CoV-2 RNA, Amplification, Qual Positive (*)     All other components within normal limits   URINALYSIS MICROSCOPIC - Abnormal; Notable for the following components:    Hyaline Casts, UA 13 (*)     All other components within normal limits    Narrative:     Collection Type->Urine, Clean Catch   POCT GLUCOSE - Abnormal; Notable for the following components:    POC Glucose 160 (*)     All other components within normal limits   ISTAT CREATININE - Abnormal; Notable for the following components:    POC Creatinine 1.6 (*)     All other components within normal limits   PROTIME-INR   TSH   TROPONIN I HIGH SENSITIVITY   LACTIC ACID, PLASMA   POCT GLUCOSE MONITORING CONTINUOUS   POCT CREATININE       US Carotid Bilateral   Final Result      X-Ray Chest AP Portable   Final Result      CTA Head and Neck (xpd)   Final Result      CT Head Without Contrast   Final Result          ASSESSMENT & PLAN:     Acute metabolic encephalopathy  Possible seizures vs TIA   Hx of stroke with right upper extremity residual weakness, and expressive aphasia   CT head, CTA head and neck negative   - Follow US carotids   - MRI brain   - EEG AM   - Follow further recommendations from neurology   - Patient was loaded with Keppra in the ED >> Cont 750 mg BID IV for now  - NPO until speech eval, aspiration precautions.   - Cont Aspirin, Plavix and Lipitor   - allow permissive hypertension     Mild COURTNEY: likely pre-renal   Baseline Cr 1.2, Cr 1.6 today   - Monitor strict I/O  - start NS 75 cc/hr   - rpt BMP AM     COVID 19: asymptomatic   - Cont isolation     Leukocytosis. Likely reactive   No signs of infection  - Monitor off antibiotics     Hx of COPD: not in exacerbation   HTN  Insulin  dependent type II DM   - Holding Clonidine, Metoprolol, Minoxidil and Losartan for now, allowing permissive hypertension, resume as indicated. PRN hydralazine IV for tonight  - Resume Norvasc   - Holding Humalog 5 units TID as the patient is NPO  - Sliding scale and Levemir to continue     Chetan Connor MD   [facility] Hospitalist  08/04/2023  8:56 PM

## 2023-08-05 NOTE — PROCEDURES
EEG REPORT    NAME: Steff Johnson  : 1960  MRN: [id number]    DATE of EE2023    CLINICAL INDICATION: This is a 63 y.o. female with history of stroke with residual RSW and aphasia being evaluated for altered mental status and seizure-like activity.    MEDICATIONS:  Scheduled Meds:   amLODIPine  10 mg Oral Daily    aspirin  81 mg Oral Daily    atorvastatin  80 mg Oral QHS    clopidogreL  75 mg Oral Daily    dantrolene  50 mg Oral TID    EScitalopram oxalate  10 mg Oral QHS    insulin detemir U-100 (Levemir)  20 Units Subcutaneous QHS    ipratropium  0.5 mg Nebulization Q6H    levETIRAcetam (Keppra) IV (PEDS and ADULTS)  750 mg Intravenous Q12H    metoprolol succinate  100 mg Oral Daily    modafiniL  200 mg Oral Daily    mupirocin   Nasal BID     Continuous Infusions:   sodium chloride 0.9% 75 mL/hr at 23 2239     PRN Meds:.dextrose 50%, glucagon (human recombinant), insulin aspart U-100, melatonin, sodium chloride 0.9%      EEG DESCRIPTION:  A 16-channel EEG was performed with electrode placement in 10/20 [employer] System. Longitudinal bipolar and referential montages were utilized in analysis. Total duration of this study was 25 minutes.    This is a technically adequate study with minor muscle and motion artifacts.    The background consists of a mixture of theta and delta rhythms, with no posterior dominant rhythm seen. Stage II sleep structures are not seen.    Photic stimulation and Hyperventilation were not performed.     No epileptiform discharges or seizures are captured.    Impression:  This is an abnormal EEG due to diffuse slowing of the background activity consistent with a moderate to severe encephalopathy. No epileptiform discharges or seizures are captured.  Findings of this study indicate a generalized encephalopathic process that is nonspecific in type. Toxic, metabolic, or structural abnormalities may be considered.  Further clinical correlation is advised.      Jennifer Magana  MD [place]  Neurology

## 2023-08-06 LAB
ANION GAP SERPL CALC-SCNC: 7 MMOL/L (ref 8–16)
BUN SERPL-MCNC: 39 MG/DL (ref 8–23)
CALCIUM SERPL-MCNC: 8.6 MG/DL (ref 8.7–10.5)
CHLORIDE SERPL-SCNC: 115 MMOL/L (ref 95–110)
CO2 SERPL-SCNC: 20 MMOL/L (ref 23–29)
CREAT SERPL-MCNC: 1.1 MG/DL (ref 0.5–1.4)
ERYTHROCYTE [DISTWIDTH] IN BLOOD BY AUTOMATED COUNT: 14.6 % (ref 11.5–14.5)
EST. GFR  (NO RACE VARIABLE): 55.8 ML/MIN/1.73 M^2
GLUCOSE SERPL-MCNC: 276 MG/DL (ref 70–110)
GLUCOSE SERPL-MCNC: 278 MG/DL (ref 70–110)
GLUCOSE SERPL-MCNC: 291 MG/DL (ref 70–110)
HCT VFR BLD AUTO: 25.5 % (ref 37–48.5)
HGB BLD-MCNC: 9.1 G/DL (ref 12–16)
MCH RBC QN AUTO: 28.6 PG (ref 27–31)
MCHC RBC AUTO-ENTMCNC: 35.7 G/DL (ref 32–36)
MCV RBC AUTO: 80 FL (ref 82–98)
PLATELET # BLD AUTO: 359 K/UL (ref 150–450)
PMV BLD AUTO: 10.4 FL (ref 9.2–12.9)
POTASSIUM SERPL-SCNC: 4.4 MMOL/L (ref 3.5–5.1)
RBC # BLD AUTO: 3.18 M/UL (ref 4–5.4)
SODIUM SERPL-SCNC: 142 MMOL/L (ref 136–145)
WBC # BLD AUTO: 12.02 K/UL (ref 3.9–12.7)

## 2023-08-06 PROCEDURE — 95813 EEG EXTND MNTR 61-119 MIN: CPT

## 2023-08-06 PROCEDURE — 95700 EEG CONT REC W/VID EEG TECH: CPT

## 2023-08-06 PROCEDURE — 95819 EEG AWAKE AND ASLEEP: CPT

## 2023-08-06 PROCEDURE — 25000003 PHARM REV CODE 250: Performed by: INTERNAL MEDICINE

## 2023-08-06 PROCEDURE — 99900035 HC TECH TIME PER 15 MIN (STAT)

## 2023-08-06 PROCEDURE — 63600175 PHARM REV CODE 636 W HCPCS: Performed by: INTERNAL MEDICINE

## 2023-08-06 PROCEDURE — 95812 EEG 41-60 MINUTES: CPT

## 2023-08-06 PROCEDURE — 97163 PT EVAL HIGH COMPLEX 45 MIN: CPT

## 2023-08-06 PROCEDURE — 36415 COLL VENOUS BLD VENIPUNCTURE: CPT | Performed by: INTERNAL MEDICINE

## 2023-08-06 PROCEDURE — 92526 ORAL FUNCTION THERAPY: CPT

## 2023-08-06 PROCEDURE — C9113 INJ PANTOPRAZOLE SODIUM, VIA: HCPCS | Performed by: INTERNAL MEDICINE

## 2023-08-06 PROCEDURE — 85027 COMPLETE CBC AUTOMATED: CPT | Performed by: INTERNAL MEDICINE

## 2023-08-06 PROCEDURE — 97530 THERAPEUTIC ACTIVITIES: CPT

## 2023-08-06 PROCEDURE — 80048 BASIC METABOLIC PNL TOTAL CA: CPT | Performed by: INTERNAL MEDICINE

## 2023-08-06 PROCEDURE — 95714 VEEG EA 12-26 HR UNMNTR: CPT

## 2023-08-06 PROCEDURE — 94761 N-INVAS EAR/PLS OXIMETRY MLT: CPT

## 2023-08-06 PROCEDURE — 21000000 HC CCU ICU ROOM CHARGE

## 2023-08-06 PROCEDURE — 99900031 HC PATIENT EDUCATION (STAT)

## 2023-08-06 PROCEDURE — 95711 VEEG 2-12 HR UNMONITORED: CPT

## 2023-08-06 PROCEDURE — 63600175 PHARM REV CODE 636 W HCPCS

## 2023-08-06 RX ORDER — PANTOPRAZOLE SODIUM 40 MG/10ML
40 INJECTION, POWDER, LYOPHILIZED, FOR SOLUTION INTRAVENOUS DAILY
Status: DISCONTINUED | OUTPATIENT
Start: 2023-08-06 | End: 2023-08-10

## 2023-08-06 RX ORDER — LEVETIRACETAM 10 MG/ML
1000 INJECTION INTRAVASCULAR EVERY 12 HOURS
Status: DISCONTINUED | OUTPATIENT
Start: 2023-08-06 | End: 2023-08-11 | Stop reason: HOSPADM

## 2023-08-06 RX ORDER — LORAZEPAM 2 MG/ML
2 INJECTION INTRAMUSCULAR ONCE
Status: COMPLETED | OUTPATIENT
Start: 2023-08-06 | End: 2023-08-06

## 2023-08-06 RX ORDER — LORAZEPAM 2 MG/ML
INJECTION INTRAMUSCULAR
Status: COMPLETED
Start: 2023-08-06 | End: 2023-08-06

## 2023-08-06 RX ORDER — ACETAMINOPHEN 650 MG/1
650 SUPPOSITORY RECTAL EVERY 6 HOURS PRN
Status: DISCONTINUED | OUTPATIENT
Start: 2023-08-06 | End: 2023-08-11 | Stop reason: HOSPADM

## 2023-08-06 RX ADMIN — LEUCINE, PHENYLALANINE, LYSINE, METHIONINE, ISOLEUCINE, VALINE, HISTIDINE, THREONINE, TRYPTOPHAN, ALANINE, GLYCINE, ARGININE, PROLINE, SERINE, TYROSINE, DEXTROSE 2000 ML: 311; 238; 247; 170; 255; 247; 204; 179; 77; 880; 438; 489; 289; 213; 17; 5 INJECTION INTRAVENOUS at 05:08

## 2023-08-06 RX ADMIN — INSULIN DETEMIR 20 UNITS: 100 INJECTION, SOLUTION SUBCUTANEOUS at 10:08

## 2023-08-06 RX ADMIN — AMLODIPINE BESYLATE 10 MG: 5 TABLET ORAL at 09:08

## 2023-08-06 RX ADMIN — LEVETIRACETAM 1000 MG: 10 INJECTION INTRAVENOUS at 09:08

## 2023-08-06 RX ADMIN — HYDRALAZINE HYDROCHLORIDE 10 MG: 20 INJECTION, SOLUTION INTRAMUSCULAR; INTRAVENOUS at 08:08

## 2023-08-06 RX ADMIN — LEUCINE, PHENYLALANINE, LYSINE, METHIONINE, ISOLEUCINE, VALINE, HISTIDINE, THREONINE, TRYPTOPHAN, ALANINE, GLYCINE, ARGININE, PROLINE, SERINE, TYROSINE, DEXTROSE 2000 ML: 311; 238; 247; 170; 255; 247; 204; 179; 77; 880; 438; 489; 289; 213; 17; 5 INJECTION INTRAVENOUS at 04:08

## 2023-08-06 RX ADMIN — LORAZEPAM 2 MG: 2 INJECTION INTRAMUSCULAR at 02:08

## 2023-08-06 RX ADMIN — DEXAMETHASONE SODIUM PHOSPHATE 4 MG: 4 INJECTION, SOLUTION INTRA-ARTICULAR; INTRALESIONAL; INTRAMUSCULAR; INTRAVENOUS; SOFT TISSUE at 09:08

## 2023-08-06 RX ADMIN — LEVETIRACETAM 2000 MG: 100 INJECTION, SOLUTION INTRAVENOUS at 03:08

## 2023-08-06 RX ADMIN — METOPROLOL SUCCINATE 100 MG: 50 TABLET, EXTENDED RELEASE ORAL at 09:08

## 2023-08-06 RX ADMIN — ENOXAPARIN SODIUM 40 MG: 40 INJECTION SUBCUTANEOUS at 05:08

## 2023-08-06 RX ADMIN — LEVETIRACETAM 500 MG: 5 INJECTION INTRAVENOUS at 08:08

## 2023-08-06 RX ADMIN — DEXAMETHASONE SODIUM PHOSPHATE 4 MG: 4 INJECTION, SOLUTION INTRA-ARTICULAR; INTRALESIONAL; INTRAMUSCULAR; INTRAVENOUS; SOFT TISSUE at 08:08

## 2023-08-06 RX ADMIN — HYDRALAZINE HYDROCHLORIDE 10 MG: 20 INJECTION, SOLUTION INTRAMUSCULAR; INTRAVENOUS at 10:08

## 2023-08-06 RX ADMIN — PANTOPRAZOLE SODIUM 40 MG: 40 INJECTION, POWDER, FOR SOLUTION INTRAVENOUS at 02:08

## 2023-08-06 NOTE — PROGRESS NOTES
[facility] Medicine  Progress Note    Patient Name: Stfef Johnson  MRN: [id number]  Patient Class: IP- Inpatient   Admission Date: 8/4/2023  Length of Stay: 2 days  Attending Physician: Nish Abarca MD  Primary Care Provider: Cristina Ren MD        Subjective:     Principal Problem:AMS (altered mental status)        HPI:  Steff Johnson is a 63 y.o. Black or  female   With PMH of stroke with expressive aphasia, right upper extremity paralysis, HTN, DM, COPD.    who presents with worsening AMS.      Patient was diagnosed with COVID a week ago. Patient currently not answering any questions, does not follow commands. Per discussion with ED provider and chart review.      Per her daughter, she  her upfrom physical rehab by her daughter today at approximately 1:00 p.m. and notice that she was not speaking as much as she normally does at home.  At baseline, per her daughter, the patient is able to speak in short phrases.  She does not complete sentences.  Patient arrives in the ED was brought back to the [facility] where she was noted to have a fixed left gaze and not be as interactive as she normally is.       In the ED, patient was hemodynamically stable, CT head and CTA head and neck was negative. Patient was loaded with Keppra. Neuro was consulted from the ED. Recommended admission for EEG and MRI brain.          Overview/Hospital Course:  08/05  Assumed care.Chart reviewed. Consultant's attendance/procedure noted and appreciated. SLP attendance noted/appreciated.  Labs reviewed and noted fully below: leukocytosis improving with stable normocytic anemia; normal cations with improving stage 3a renal dysfunction. Telemetry reviewed: sinus.  (boyfriend X 20 years) at bedside. Patient remains encephalopathic. Receiving levetiracetam, no further seizure like activity noted.  Plan: Continue current regimen/course; Neurology consultation pending; will start low  "dose  dexamethasone, and Clinamix; AM labs for review.    08/06  Consultant's attendance noted and appreciated. Labs reviewed and noted fully below: no leukocytosis with mild microcytic anemia; normal electrolytes with stage 3a renal dysfunction (normal TSH noted). Telemetry reviewed: sinus. Patient is awake with eyes open. However she does not respond. RN states she tracked with eyes to left for Neuro, and took a "Teaspoon of water" for SLP but no purposeful response since then. MR Brain ordered, yet pending. Receiving EEG currently (started just after exam).  Plan: Continue current course/regimen--adding PPI because she is receiving dexamethasone; for MRI; AM labs for review      Interval History: unresponsive    Review of Systems   Unable to perform ROS: Mental status change     Objective:     Vital Signs (Most Recent):  Temp: 99 °F (37.2 °C) (08/06/23 1111)  Pulse: 74 (08/06/23 1111)  Resp: 18 (08/06/23 1111)  BP: (!) 160/70 (08/06/23 1226)  SpO2: 95 % (08/06/23 1226) Vital Signs (24h Range):  Temp:  [98.5 °F (36.9 °C)-99.4 °F (37.4 °C)] 99 °F (37.2 °C)  Pulse:  [67-79] 74  Resp:  [13-19] 18  SpO2:  [95 %-99 %] 95 %  BP: (160-206)/() 160/70     Weight: 55.7 kg (122 lb 12.7 oz)  Body mass index is 24.8 kg/m².    Intake/Output Summary (Last 24 hours) at 8/6/2023 1326  Last data filed at 8/6/2023 1225  Gross per 24 hour   Intake 0 ml   Output 1300 ml   Net -1300 ml         Physical Exam  Vitals and nursing note reviewed.   Constitutional:       Appearance: She is well-developed.   HENT:      Head: Normocephalic and atraumatic.      Right Ear: External ear normal.      Left Ear: External ear normal.      Nose: Nose normal.   Eyes:      Conjunctiva/sclera: Conjunctivae normal.      Pupils: Pupils are equal, round, and reactive to light.   Cardiovascular:      Rate and Rhythm: Normal rate and regular rhythm.      Heart sounds: Normal heart sounds.   Pulmonary:      Effort: Pulmonary effort is normal.      Breath " sounds: Normal breath sounds.   Abdominal:      General: Bowel sounds are normal.      Palpations: Abdomen is soft.   Musculoskeletal:         General: Normal range of motion.      Cervical back: Neck supple.   Skin:     General: Skin is warm and dry.      Capillary Refill: Capillary refill takes less than 2 seconds.   Neurological:      Mental Status: She is alert.      Comments: Eyes open, does not fixate.  CN grossly intact  Does not follow any command   Psychiatric:      Comments: Unable to assess             Significant Labs: All pertinent labs within the past 24 hours have been reviewed.  BMP:   Recent Labs   Lab 08/06/23  0410   *      K 4.4   *   CO2 20*   BUN 39*   CREATININE 1.1   CALCIUM 8.6*     CBC:   Recent Labs   Lab 08/04/23  1831 08/05/23  0408 08/06/23  0410   WBC 16.23* 12.72* 12.02   HGB 10.7* 10.0* 9.1*   HCT 30.7* 28.6* 25.5*    315 359       Significant Imaging: I have reviewed all pertinent imaging results/findings within the past 24 hours.      Assessment/Plan:      No notes have been filed under this hospital service.  Service: Hospital Medicine    VTE Risk Mitigation (From admission, onward)         Ordered     enoxaparin injection 40 mg  Every 24 hours         08/05/23 1838     IP VTE HIGH RISK PATIENT  Once         08/04/23 2233     Place sequential compression device  Until discontinued         08/04/23 2233                Discharge Planning   ALEX:      Code Status: Full Code   Is the patient medically ready for discharge?:     Reason for patient still in hospital (select all that apply): Patient trending condition, Treatment and Imaging  Discharge Plan A: Home with family                  Nish Abarca MD  Department of Hospital Medicine   [facility]

## 2023-08-06 NOTE — PROGRESS NOTES
[facility]  Department of Neurology  Progress Note  Date: 2023 2:55 PM          Patient Name: Steff Johnson   MRN: [id number]   : 1960    AGE: 63 y.o.    LOS: 2 days Hospital Day: 3  Admit date: 2023  6:11 PM        HPI per EMR: Steff Johnson is a 63 y.o. female with a history of  stroke with expressive aphasia, right upper extremity paralysis, HTN, DM, COPD.    who presents with worsening AMS.      Patient was diagnosed with COVID a week ago. Patient currently not answering any questions, does not follow commands. Per discussion with ED provider and chart review.      Per her daughter, she  her upfrom physical rehab by her daughter today at approximately 1:00 p.m. and notice that she was not speaking as much as she normally does at home.  At baseline, per her daughter, the patient is able to speak in short phrases.  She does not complete sentences.  Patient arrives in the ED was brought back to the [facility] where she was noted to have a fixed left gaze and not be as interactive as she normally is.       In the ED, patient was hemodynamically stable, CT head and CTA head and neck was negative. Patient was loaded with Keppra in ER with concern for seizures.  Admitted to the hospital for further workup.     Neurology consult:  Patient was seen examined by me.  She tracks however does not answer to questions or follow commands.  Patient's  is at bedside states that he has been like this since her recent admission to the hospital and only would make few words which has also been decreased in the last few days for which he brought to the hospital.  He states that mental status has been worse in the last few days and he noticed that yesterday she was not speaking at all.     In the ER, she was noticed to have fixed left gaze and not as interactive for which a stat EEG was done showed no seizures.  CT head was done showed no acute intracranial pathology and CT angiogram head  and neck showed no large vessel occlusion/high-grade stenosis.     She was recently admitted to the hospital in July 2023 for generalized weakness, dysarthria which is worsened from baseline.  She had an MRI brain at that time showed a small right frontal acute ischemic stroke.  She was discharged home on dual antiplatelet therapy per Neurology recommendations at that time.  Her mental status at that time was awake and oriented x4 with speech being dysarthric and making only few words at a time.  Her examined now seems to be significantly different compared to her prior neurology encounter.     08/06/2023: No acute events overnight. Patient was seen and examined by me this morning. Neuro exam is unchanged. She has L gaze preference and tracks but does not follow or answer to questions       Vitals:  Patient Vitals for the past 24 hrs:   BP Temp Temp src Pulse Resp SpO2 Weight   08/06/23 1226 (!) 160/70 -- -- -- -- 95 % --   08/06/23 1111 (!) 179/74 99 °F (37.2 °C) -- 74 18 96 % --   08/06/23 1101 (!) 193/81 99 °F (37.2 °C) -- 78 18 96 % --   08/06/23 0901 (!) 163/70 -- -- 78 18 99 % --   08/06/23 0830 -- -- -- 69 16 96 % --   08/06/23 0709 -- 98.8 °F (37.1 °C) -- 71 19 97 % --   08/06/23 0701 (!) 177/74 -- -- 70 19 98 % --   08/06/23 0644 -- -- -- -- -- 96 % --   08/06/23 0501 -- -- -- 75 15 96 % --   08/06/23 0500 (!) 164/72 -- -- 74 16 95 % --   08/06/23 0400 (!) 170/72 -- -- 79 18 96 % --   08/06/23 0357 -- -- -- -- -- -- 55.7 kg (122 lb 12.7 oz)   08/06/23 0301 (!) 166/71 99 °F (37.2 °C) Axillary 77 16 97 % --   08/06/23 0000 (!) 176/77 -- -- 72 16 96 % --   08/05/23 2301 -- 99.4 °F (37.4 °C) Axillary -- -- -- --   08/05/23 2300 (!) 197/114 -- -- 72 15 96 % --   08/05/23 2200 (!) 183/86 -- -- 73 16 97 % --   08/05/23 2020 (!) 198/93 -- -- -- -- -- --   08/05/23 2003 (!) 206/99 -- -- 69 15 96 % --   08/05/23 1901 (!) 176/81 98.5 °F (36.9 °C) Axillary 69 15 96 % --   08/05/23 1701 (!) 180/80 -- -- 67 13 98 % --    08/05/23 1501 -- -- -- 67 14 98 % --   08/05/23 1500 (!) 177/78 -- -- 68 16 97 % --     PHYSICAL EXAM:      GENERAL APPEARANCE: Alert, well-developed, female in no acute distress.  HEENT: Normocephalic and atraumatic. PERRL. Oropharynx unremarkable.  PULM: Normal respiratory effort. No accessory muscle use.  CV: RRR.  ABDOMEN: Soft, nontender.  EXTREMITIES: No obvious signs of vascular compromise. Pulses present. No cyanosis, clubbing or edema.  SKIN: Clear; no rashes, lesions or skin breaks in exposed areas.     NEURO:  MENTAL STATUS:  She is awake, L gaze preference,able to track however does not talk or follow commands.     CRANIAL NERVES:  Pupils equal round and reactive to light.  Extraocular movements are intact, face is with mild right facial droop     MOTOR:  Bulk normal. Tone normal and symmetric throughout.  Abnormal movements absent.  Tremor: none present.  Strength could not be tested.  Moves all extremities to stimulus left greater than right     REFLEXES:  DTRs 1+ throughout.  Plantar response downgoing bilaterally.  SENSATION: not tested.  COORDINATION:  Could not be tested .  STATION: not tested.  GAIT: not tested.       CURRENT SCHEDULED MEDICATIONS:   amLODIPine  10 mg Oral Daily    aspirin  81 mg Oral Daily    atorvastatin  80 mg Oral QHS    dantrolene  50 mg Oral TID    dexAMETHasone  4 mg Intravenous Q12H    enoxparin  40 mg Subcutaneous Q24H (prophylaxis, 1700)    EScitalopram oxalate  10 mg Oral QHS    insulin detemir U-100 (Levemir)  20 Units Subcutaneous QHS    levETIRAcetam (Keppra) 2,000 mg in dextrose 5 % (D5W) 100 mL IVPB  2,000 mg Intravenous Once    levETIRAcetam (Keppra) IV (PEDS and ADULTS)  1,000 mg Intravenous Q12H    metoprolol succinate  100 mg Oral Daily    modafiniL  200 mg Oral Daily    mupirocin   Nasal BID    pantoprazole  40 mg Intravenous Daily     CURRENT INFUSIONS:   amino acid 4.25 % in D5W 2,000 mL (08/06/23 0540)     DATA:  Recent Labs   Lab 08/04/23  1170  "08/05/23  0408 08/06/23  0410    144 142   K 5.0 4.3 4.4   * 116* 115*   CO2 22* 20* 20*   BUN 37* 31* 39*   CREATININE 1.6* 1.2 1.1   * 106 276*   CALCIUM 8.9 8.6* 8.6*   AST 34  --   --    ALT 44  --   --      Recent Labs   Lab 08/04/23  1831 08/05/23 0408 08/06/23 0410   WBC 16.23* 12.72* 12.02   HGB 10.7* 10.0* 9.1*   HCT 30.7* 28.6* 25.5*    315 359     No results found for: "PROTEINCSF", "GLUCCSF", "WBCCSF", "RBCCSF", "PMNCSF"  Hemoglobin A1C   Date Value Ref Range Status   08/05/2023 6.4 (H) 4.5 - 6.2 % Final     Comment:     According to ADA guidelines, hemoglobin A1C <7.0% represents  optimal control in non-pregnant diabetic patients.  Different  metrics may apply to specific populations.   Standards of Medical Care in Diabetes - 2016.    For the purpose of screening for the presence of diabetes:  <5.7%     Consistent with the absence of diabetes  5.7-6.4%  Consistent with increasing risk for diabetes   (prediabetes)  >or=6.5%  Consistent with diabetes    Currently no consensus exists for use of hemoglobin A1C  for diagnosis of diabetes for children.     05/03/2023 10.2 (H) 4.5 - 6.2 % Final     Comment:     According to ADA guidelines, hemoglobin A1C <7.0% represents  optimal control in non-pregnant diabetic patients.  Different  metrics may apply to specific populations.   Standards of Medical Care in Diabetes - 2016.    For the purpose of screening for the presence of diabetes:  <5.7%     Consistent with the absence of diabetes  5.7-6.4%  Consistent with increasing risk for diabetes   (prediabetes)  >or=6.5%  Consistent with diabetes    Currently no consensus exists for use of hemoglobin A1C  for diagnosis of diabetes for children.     02/22/2023 8.9 (H) 4.0 - 5.6 % Final     Comment:     ADA Screening Guidelines:  5.7-6.4%  Consistent with prediabetes  >or=6.5%  Consistent with diabetes    High levels of fetal hemoglobin interfere with the HbA1C  assay. Heterozygous " hemoglobin variants (HbS, HgC, etc)do  not significantly interfere with this assay.   However, presence of multiple variants may affect accuracy.              I have personally reviewed and interpreted the pertinent imaging and lab results.  Imaging Results              US Carotid Bilateral (Final result)  Result time 08/04/23 20:42:04      Final result by Brooke Cottrell MD (08/04/23 20:42:04)                   Narrative:    PROCEDURE:    US CAROTID DOPPLER BILATERAL  dated  8/4/2023 7:16 PM CDT    CLINICAL HISTORY:   Female 63 years of age.    TECHNIQUE:  Multiple sagittal and transverse images of the carotid arterial system of the neck were obtained.  2-D vascular structure, Doppler spectral analysis and color flow Doppler imaging was performed.  All measurements and percent stenoses described below were determined using NASCET criteria or criteria similar to NASCET, as defined by the Society of Radiologist in Ultrasound Consensus Conference Radiology 2003.    PREVIOUS STUDIES:  None Available    FINDINGS:    Right carotid system:  There is no plaque. The peak systolic velocity in the right CCA is 96 cm/sec.  The peak systolic velocity within the internal carotid artery is 81 cm/sec.  The ICA/CCA ratio is calculated as 0.8.  The external carotid artery is patent with antegrade flow. Antegrade flow is present in the vertebral artery.    Left carotid system:  There is no plaque.  The peak systolic velocity in the left CCA is 110 cm/sec.  The peak systolic velocity within the internal carotid artery is 76 cm/sec.  The ICA/CCA ratio is calculated as 0.7.  The external carotid artery is patent with antegrade flow. Antegrade flow is present in the vertebral artery.    IMPRESSION:    Normal bilateral carotid ultrasound.    Electronically signed by:  Brooke Jade MD  8/4/2023 8:42 PM CDT Workstation: [id number]                                     X-Ray Chest AP Portable (Final result)  Result time 08/04/23 19:09:18       Final result by Rj Kellogg MD (08/04/23 19:09:18)                   Narrative:    History: Stroke.    FINDINGS: AP view of the chest is compared to previous study dated July 29, 2023. No focal infiltrate or pleural effusion is seen. Cardiac silhouette is enlarged. Bony thorax appears intact.    IMPRESSION:  1. No acute cardiopulmonary change seen.    Electronically signed by:  Rj Kellogg MD  8/4/2023 7:09 PM CDT Workstation: [id number]                                     CTA Head and Neck (xpd) (Final result)  Result time 08/04/23 18:55:02      Final result by Rj Kellogg MD (08/04/23 18:55:02)                   Narrative:    CMS MANDATED QUALITY DATA - CT RADIATION - 436    All CT scans at this facility use dose modulation, iterative-reconstruction, and/or weight-based dosing when appropriate to reduce radiation dose to as low as reasonably achievable.    CMS MANDATED QUALITY [id number]    NASCET CRITERIA WAS UTILIZED FOR ESTIMATION OF STENOSIS SEVERITY WITH THE NARROWEST SEGMENT BEING COMPARED TO THE DISTAL LUMINAL DIAMETER.        HISTORY:Stroke/TIA. Determined embolic source.    TECHNIQUE: Serial axial images were obtained from aortic arch through the vertex with 100 cc of Omnipaque 350 intravenous contrast utilizing a CT angiography protocol. Coronal and sagittal MIP CT angiography reconstructions were performed. Patient received approximately 158 mGy-cm of radiation exposure from this exam.    COMPARISON: Comparison to previous study dated July 4, 2023.    FINDINGS:Aortic arch is unremarkable. Great vessel origins appear widely patent. Ectatic patent cervical carotid arteries are visualized. No evidence of significant cervical carotid artery stenosis is seen. Minimal atherosclerotic changes are present at the left cervical carotid bifurcation. Codominant patent vertebral arteries are visualized.    Internal carotid arteries appear widely patent at the skull base. Codominant  vertebral arteries are present at the skull base with a widely patent basilar artery. Bilateral anterior, middle, and posterior cerebral arteries appear patent without evidence of significant stenosis. Patent bilateral posterior communicating arteries are identified.    IMPRESSION:  1. No evidence of cervical carotid/vertebral artery occlusion or significant stenosis.  2. No evidence of large vessel intracranial arterial occlusion or significant stenosis is seen.          Electronically signed by:  Rj Kellogg MD  8/4/2023 6:55 PM CDT Workstation: [id number]                                     CT Head Without Contrast (Edited Result - FINAL)  Result time 08/04/23 19:36:33      Edited Result - FINAL by Fozia Le DO (08/04/23 19:36:33)                   Narrative:         ADDENDUM #1       This report contains critical findings that may be critical to patient care.    At 6:47 PM CST on 8/4/2023 , the report findings were confirmed with Dr. Torrez has received exam report, is aware of the finding(s), and indicated that a call was not necessary to discuss exam findings.        Electronically signed by:  Fozia Le DO  8/4/2023 7:36 PM CDT Workstation: [id number]     ORIGINAL REPORT       CT of the head: 8/4/2023 6:40 PM CDT    HISTORY: 63 years  old Female with Neuro deficit, acute, stroke suspected. Patient found unresponsive, altered mental status    COMPARISON: 7/3/2023    TECHNIQUE: Multiple axial contiguous images were obtained through the head without intravenous contrast administered. This exam was performed according to our departmental dose-optimization program, which includes automated exposure control, adjustment of the mA and/or KV according to the patient's size and/or use of iterative reconstruction technique.    FINDINGS: The ventricles and cerebral sulci demonstrate mild prominence, consistent with cerebral atrophy. There are moderate periventricular hypodensities, suggestive of  periventricular white matter changes.    There are similar hypodensities at the left frontal lobe and left basal ganglia.    The gray-white matter differentiation is within normal limits.    Both globes appear symmetric.    The mastoid air cells appear clear.    The visualized paranasal sinuses appear clear.    The calvarium is intact.    No extra-axial fluid collection is seen.    No midline shift or mass effect is apparent.    There are no findings to suggest acute intracranial hemorrhage.    IMPRESSION: 1. No acute intracranial hemorrhage is seen.  2. There is mild to moderate cerebral atrophy and periventricular white matter changes are seen.    Electronically signed by:  Fozia Le DO  8/4/2023 6:41 PM CDT Workstation: [id number]                      Final result by Fozia Le DO (08/04/23 18:41:20)                   Narrative:    CT of the head: 8/4/2023 6:40 PM CDT    HISTORY: 63 years  old Female with Neuro deficit, acute, stroke suspected. Patient found unresponsive, altered mental status    COMPARISON: 7/3/2023    TECHNIQUE: Multiple axial contiguous images were obtained through the head without intravenous contrast administered. This exam was performed according to our departmental dose-optimization program, which includes automated exposure control, adjustment of the mA and/or KV according to the patient's size and/or use of iterative reconstruction technique.    FINDINGS: The ventricles and cerebral sulci demonstrate mild prominence, consistent with cerebral atrophy. There are moderate periventricular hypodensities, suggestive of periventricular white matter changes.    There are similar hypodensities at the left frontal lobe and left basal ganglia.    The gray-white matter differentiation is within normal limits.    Both globes appear symmetric.    The mastoid air cells appear clear.    The visualized paranasal sinuses appear clear.    The calvarium is intact.    No extra-axial fluid  collection is seen.    No midline shift or mass effect is apparent.    There are no findings to suggest acute intracranial hemorrhage.    IMPRESSION: 1. No acute intracranial hemorrhage is seen.  2. There is mild to moderate cerebral atrophy and periventricular white matter changes are seen.    Electronically signed by:  Fozia Rey ROMERO  8/4/2023 6:41 PM CDT Workstation: [id number]                                            ASSESSMENT AND PLAN:          Encephalopathy  Prior stroke  Seizure like activity  COVID infection     Plan:   Admitted to the hospital with encephalopathy and unable to talk for the past few days with worsening in the last 1 day.  Etiology is unknown however need to rule out acute CVA versus seizure with postictal state versus metabolic/infectious causes of encephalopathy-COVID.  CT head and CT angiogram head and neck done no significant acute abnormalities.  MRI brain ordered and pending.   Stat EEG was done showed no seizures or  Epileptiform activity.  Repeat EEG was done on 08/06 showed generalized periodic discharges with sharp waveform with seizure potential however no seizures were recorded.  2 mg Ativan was given during the study which cleared the background.  Additional loading dose of Keppra 2 g was given and maintenance dose of Keppra was increased to 1000 mg b.i.d..  Started on continuous long-term EEG monitoring.  Aspirin, Lipitor for stroke prevention.  Will hold off on Plavix as patient does microhemorrhages and dual antiplatelet therapy increases risk of intracranial hemorrhage.  Physical and occupational therapy evaluate and treat.  Neuro checks per unit protocol.  Workup/management for metabolic/infectious causes of encephalopathy per primary team.  Will follow         Geoffrey Roque MD  Neurology/vascular Neurology  Date of Service: 08/06/2023  2:55 PM    Please note: This note was transcribed using voice recognition software. Because of this technology there are often  uinintended grammatical, spelling, and other transcription errors. Please disregard these errors.

## 2023-08-06 NOTE — PROCEDURES
EEG REPORT    NAME: Steff Johnson  : 1960  MRN: [id number]    DATE of EE2023    CLINICAL INDICATION: This is a 63 y.o. female with history of stroke, residual weakness and aphasia, being evaluated for altered mental status and seizure-like activity.      MEDICATIONS:    amLODIPine  10 mg Oral Daily    aspirin  81 mg Oral Daily    atorvastatin  80 mg Oral QHS    dantrolene  50 mg Oral TID    dexAMETHasone  4 mg Intravenous Q12H    enoxparin  40 mg Subcutaneous Q24H (prophylaxis, 1700)    EScitalopram oxalate  10 mg Oral QHS    insulin detemir U-100 (Levemir)  20 Units Subcutaneous QHS    levETIRAcetam (Keppra) 1,995 mg in sodium chloride 0.9% 133 mL IV Syringe  1,995 mg Intravenous Once    levETIRAcetam (Keppra) IV (PEDS and ADULTS)  1,000 mg Intravenous Q12H    metoprolol succinate  100 mg Oral Daily    modafiniL  200 mg Oral Daily    mupirocin   Nasal BID    pantoprazole  40 mg Intravenous Daily         EEG DESCRIPTION:  A 16-channel EEG was performed with electrode placement in 10/20 International System. Longitudinal bipolar and referential montages were utilized in analysis.    This is a technically adequate study with minor muscle and motion artifacts.    Initial portions of the tracing showed an admixtured background rhythm of delta and theta frequency waveforms. During the initial portions of the study, there is frequent generalized periodic discharges which intermittently have sharp morphology with a frequency of 2-3hz however no evolution is noted and also the periodic discharges do not continue to last for more than 10 seconds.  During this study, 2 mg Ativan was given after which the background of generalized periodic discharges has been disrupted and showed an admixture of alpha and beta frequency waveforms. The later portion this study showed admixture of delta and beta frequency waveforms.    The record was reactive to eye opening and closure.  Stage 2 sleep structures were not seen.  Photic stimulation and hyperventilation was not performed    No epileptiform discharges were recorded. No electrographic or electroclinical seizures were recorded.    IMPRESSION:  This is an abnormal EEG showing frequent periods of generalized periodic discharges(GPDs) with sharp morphology indicating low seizure threshold.  The GPDs do not evolve into seizures during this study.  There is disruption of background noted after Ativan administration and background showing delta and beta activity indicating generalized encephalopathy.  No electrographic electro clinical seizures were recorded        Geoffrey Roque MD  Neurology

## 2023-08-06 NOTE — SUBJECTIVE & OBJECTIVE
Interval History: unresponsive    Review of Systems   Unable to perform ROS: Mental status change     Objective:     Vital Signs (Most Recent):  Temp: 99 °F (37.2 °C) (08/06/23 1111)  Pulse: 74 (08/06/23 1111)  Resp: 18 (08/06/23 1111)  BP: (!) 160/70 (08/06/23 1226)  SpO2: 95 % (08/06/23 1226) Vital Signs (24h Range):  Temp:  [98.5 °F (36.9 °C)-99.4 °F (37.4 °C)] 99 °F (37.2 °C)  Pulse:  [67-79] 74  Resp:  [13-19] 18  SpO2:  [95 %-99 %] 95 %  BP: (160-206)/() 160/70     Weight: 55.7 kg (122 lb 12.7 oz)  Body mass index is 24.8 kg/m².    Intake/Output Summary (Last 24 hours) at 8/6/2023 1326  Last data filed at 8/6/2023 1225  Gross per 24 hour   Intake 0 ml   Output 1300 ml   Net -1300 ml         Physical Exam  Vitals and nursing note reviewed.   Constitutional:       Appearance: She is well-developed.   HENT:      Head: Normocephalic and atraumatic.      Right Ear: External ear normal.      Left Ear: External ear normal.      Nose: Nose normal.   Eyes:      Conjunctiva/sclera: Conjunctivae normal.      Pupils: Pupils are equal, round, and reactive to light.   Cardiovascular:      Rate and Rhythm: Normal rate and regular rhythm.      Heart sounds: Normal heart sounds.   Pulmonary:      Effort: Pulmonary effort is normal.      Breath sounds: Normal breath sounds.   Abdominal:      General: Bowel sounds are normal.      Palpations: Abdomen is soft.   Musculoskeletal:         General: Normal range of motion.      Cervical back: Neck supple.   Skin:     General: Skin is warm and dry.      Capillary Refill: Capillary refill takes less than 2 seconds.   Neurological:      Mental Status: She is alert.      Comments: Eyes open, does not fixate.  CN grossly intact  Does not follow any command   Psychiatric:      Comments: Unable to assess             Significant Labs: All pertinent labs within the past 24 hours have been reviewed.  BMP:   Recent Labs   Lab 08/06/23  0410   *      K 4.4   *   CO2 20*    BUN 39*   CREATININE 1.1   CALCIUM 8.6*     CBC:   Recent Labs   Lab 08/04/23  1831 08/05/23  0408 08/06/23  0410   WBC 16.23* 12.72* 12.02   HGB 10.7* 10.0* 9.1*   HCT 30.7* 28.6* 25.5*    315 359       Significant Imaging: I have reviewed all pertinent imaging results/findings within the past 24 hours.

## 2023-08-06 NOTE — PT/OT/SLP PROGRESS
Speech Language Pathology Treatment    Patient Name:  Steff Johnson   MRN:  [id number]  Admitting Diagnosis: AMS (altered mental status)    Recommendations:                 General Recommendations:  Dysphagia therapy and Cognitive-linguistic evaluation  Diet recommendations:  NPO, Pleasure Feeding (puree and tsp sips water pleasure feeds), Liquid Diet Level: NPO   Aspiration Precautions:  Pleasure feeds w/ RN if pt fully awake/alert and accepting of PO, Frequent oral care, and HOB to 90 degrees   General Precautions: Standard, aspiration, NPO  Communication strategies:  none    Assessment:     Steff Johnson is a 63 y.o. female with an SLP diagnosis of Aphasia and Dysphagia.  She presents with improved oral mobility and acceptance, though pt continues w/ deficits re oral movement. Pt continues to present w/o expression or demonstration of understanding; no verbal communication during today's session. Few facial expressions noted. Rec pt continue NPO w/ pleasure feeds of tsp sips water and puree if demonstrating desire for nutrition. ST to continue POC.    Subjective     Pt laying in bed w/ eyes open looking to Left; eyes moving up and down and increasingly left as SLP completed session. Pt pleasant during tx.  Patient goals: unable to state     Pain/Comfort:       Respiratory Status: Room air    Objective:     Has the patient been evaluated by SLP for swallowing?   Yes  Keep patient NPO? Yes   Current Respiratory Status:        Pt was asked orientation questions w/o responses. No indication of yes/no when asked questions; no initiation observed during prompting for directives for oral [facility] exam.     Oral care was provided; pt opening lips to moistened toothette swab. Gradual oral opening as oral care was provided. Dentition observed to be mostly intact and adequate, mucosa observed to be adequate and moist, and lingual and labial movement noted to be functional.     Ice chip to lips resulting in slowed oral opening  and acceptance of chip. Trial resulted in pharyngeal swallow; pt appeared to be holding ice in oral cavity, though unable to determine if entire bolus was cleared from oral cavity due to pt not opening oral cavity after trial. Suction utilized w/ mild return. Pt was observed to manipulate ice chip; mastication noted to be appropriate. Tsp sips water x4 w/ decreased oral opening/acceptance of spoon and closure around utensil; however, adequate labial seal and pharyngeal swallow noted w/ trials. Tsp bite applesauce resulting in adequate bolus manipulation facial grimacing (presumably to tartness), and timely pharyngeal swallow initiation. Unable to visualize oral cavity to determine oral clearance; however, anterior portion of oral cavity and lingual surface observed to be clear. Swallow palpated across trials and determined to be timely, reagan, and w/ adequate laryngeal elevation/excursion; no drops in O2 saturation, immediate coughing/throat clearing, or overt s/s distress or aspiration noted. Delayed cough noted at conclusion of trials; however, cough perceived to be congested and not characteristic of aspiration/wet cough.    Goals:   Multidisciplinary Problems       SLP Goals          Problem: SLP    Goal Priority Disciplines Outcome   SLP Goal     SLP Ongoing, Progressing   Description: 1. Pt will participate in ongoing assessment of communication and swallow.  2. Pt will tolerate a small ice chip in her mouth without adverse response/turning away (allowing placement of ice chip).  3.  Pt will produce voice with model x 20% of trials.  4.  Pt will respond to yes/no questions with 60% accuracy via any modality.                         Plan:     Patient to be seen:  6 x/week   Plan of Care expires:  08/26/23  Plan of Care reviewed with:  patient, other (see comments) (nursing)   SLP Follow-Up:  Yes       Discharge recommendations:  rehabilitation facility   Barriers to Discharge:   nutritional status    Time  Tracking:     SLP Treatment Date:   08/06/23  Speech Start Time:  1144  Speech Stop Time:  1203     Speech Total Time (min):  19 min    Billable Minutes: Treatment Swallowing Dysfunction 19 min    08/06/2023

## 2023-08-06 NOTE — PLAN OF CARE
08/05/23 2003   Patient Assessment/Suction   Level of Consciousness (AVPU) alert   Respiratory Effort Normal;Unlabored   Rhythm/Pattern, Respiratory no shortness of breath reported   PRE-TX-O2   Device (Oxygen Therapy) room air   SpO2 96 %   Pulse Oximetry Type Continuous   $ Pulse Oximetry - Multiple Charge Pulse Oximetry - Multiple   Pulse 69   Resp 15   BP (!) 206/99   Education   $ Education 15 min   Respiratory Evaluation   $ Care Plan Tech Time 15 min   Home Oxygen   Has Home Oxygen? No

## 2023-08-06 NOTE — PT/OT/SLP EVAL
Physical Therapy Evaluation    Patient Name:  Steff Johnson   MRN:  [id number]    Recommendations:     Discharge Recommendations: rehabilitation facility, other (see comments) (return to rehab)   Discharge Equipment Recommendations: to be determined by next level of care   Barriers to discharge:  increased level of assist needed    Assessment:     Steff Johnson is a 63 y.o. female admitted with a medical diagnosis of AMS (altered mental status).  She presents with the following impairments/functional limitations: weakness, impaired endurance, impaired sensation, impaired functional mobility, impaired balance, impaired cognition, decreased coordination, decreased upper extremity function, decreased lower extremity function, abnormal tone, impaired cardiopulmonary response to activity.    Pt found with HOB elevated and just finished working with SLP for swallow eval.  Pt is non-verbal during assessment today and does not follow commands or exhibit any active movement of extremities.  She holds left side gaze with eyes as well as cervical rotation to left.  Body is in windswept posture to the right with head to the left, increased tone/resistance, but PT able to perform PROM cervical rotation to neutral. Fair-poor sitting balance.  Requires min/modA to maintain sitting balance, falls to R slowly when assist is withdrawn, no righting reactions observed.  Continue to assess for progress or change in mental status.     Rehab Prognosis: Fair; patient would benefit from acute skilled PT services to address these deficits and reach maximum level of function.    Recent Surgery: * No surgery found *      Plan:     During this hospitalization, patient to be seen daily to address the identified rehab impairments via therapeutic activities, therapeutic exercises, neuromuscular re-education and progress toward the following goals:    Plan of Care Expires:       Subjective     Chief Complaint: none, pt is non-verbal and no family  present  Patient/Family Comments/goals: none stated  Pain/Comfort:  Pain Rating 1: other (see comments) (unable to rate)    Patients cultural, spiritual, [employer] conflicts given the current situation:      Living Environment:  Pt previously lived in mobile home with significant other, immediately prior to this admission pt was in rehab s/p CVA  Prior to admission, patients level of function was unable to determine, per chart review last history states requires some assist for mobility.  Equipment used at home: cane, straight, bedside commode, rollator, walker, rolling.  DME owned (not currently used): none.  Upon discharge, patient will have assistance from family.    Objective:     Communicated with Jared GARCIA prior to session.  Patient found HOB elevated with telemetry, blood pressure cuff, pulse ox (continuous), del rosario catheter, bed alarm  upon PT entry to room.    General Precautions: Standard, aspiration, NPO, fall, droplet, contact, airborne  Orthopedic Precautions:    Braces:    Respiratory Status: Room air    Exams:  Cognitive Exam:  Patient is oriented to unable to determine due to pt is non-verbal during evaluation  RLE ROM: WFL  RLE Strength: no active movement noted  LLE ROM: WFL  LLE Strength: no active movement noted    Functional Mobility:  Bed Mobility:     Rolling Left:  total assistance  Rolling Right: total assistance  Scooting: maximal assistance and of 2 persons  Supine to Sit: maximal assistance and of 2 persons  Sit to Supine: maximal assistance and of 2 persons      AM-PAC 6 CLICK MOBILITY  Total Score:8       Treatment & Education:  Pt was educated on the following: call light use, importance of OOB activity and functional mobility to negate the negative effects of prolonged bed rest during this hospitalization, safe transfers/ambulation and discharge planning recommendations/options.     Patient left HOB elevated with all lines intact, call button in reach, bed alarm on, and RN  notified.    GOALS:   Multidisciplinary Problems       Physical Therapy Goals          Problem: Physical Therapy    Goal Priority Disciplines Outcome Goal Variances Interventions   Physical Therapy Goal     PT, PT/OT      Description: 1. Supine to sit with min Assistance  2. Sit to stand transfer with min Assistance  3.. Bed to chair transfer with assist of 1 using Rolling Walker as needed  4.  Pt will perform bilat LE therex for HEP x5 each w/Vcs.                            History:     Past Medical History:   Diagnosis Date    Arthritis     Complete tear of left rotator cuff 11/09/2017    COPD (chronic obstructive pulmonary disease)     COVID-19 infection 07/02/2021    Diabetes mellitus     H/o Cerebrovascular accident (CVA) of left pontine structure 12/12/2019    Hypertension     Personal history of colonic polyps     Stroke     Wears glasses        Past Surgical History:   Procedure Laterality Date    COLONOSCOPY N/A 4/11/2017    Procedure: COLONOSCOPY;  Surgeon: Jared Antonio MD;  Location: [facility];  Service: Endoscopy;  Laterality: N/A;    HYSTERECTOMY      OOPHORECTOMY      SHOULDER SURGERY      R    TRANSESOPHAGEAL ECHOCARDIOGRAM WITH POSSIBLE CARDIOVERSION (GT W/ POSS CARDIOVERSION) N/A 5/4/2023    Procedure: Transesophageal echo (GT) intra-procedure log documentation;  Surgeon: Blade Phillip MD;  Location: [facility] CATH/EP LAB;  Service: Cardiology;  Laterality: N/A;       Time Tracking:     PT Received On: 08/06/23  PT Start Time: 1158     PT Stop Time: 1215  PT Total Time (min): 17 min     Billable Minutes: Evaluation 7 and Therapeutic Activity 10      08/06/2023

## 2023-08-06 NOTE — NURSING
Patient is under 24 hour EEG monitor with camera trained on her the equipment can not be moved so it is impossible to scan patient and med at bedside because computer is blocked

## 2023-08-07 PROBLEM — I63.9 ISCHEMIC STROKE: Status: RESOLVED | Noted: 2023-05-02 | Resolved: 2023-08-07

## 2023-08-07 LAB
ANION GAP SERPL CALC-SCNC: 3 MMOL/L (ref 8–16)
BUN SERPL-MCNC: 40 MG/DL (ref 8–23)
CALCIUM SERPL-MCNC: 8.5 MG/DL (ref 8.7–10.5)
CHLORIDE SERPL-SCNC: 114 MMOL/L (ref 95–110)
CO2 SERPL-SCNC: 18 MMOL/L (ref 23–29)
CREAT SERPL-MCNC: 0.9 MG/DL (ref 0.5–1.4)
ERYTHROCYTE [DISTWIDTH] IN BLOOD BY AUTOMATED COUNT: 14.8 % (ref 11.5–14.5)
EST. GFR  (NO RACE VARIABLE): >60 ML/MIN/1.73 M^2
GLUCOSE SERPL-MCNC: 287 MG/DL (ref 70–110)
GLUCOSE SERPL-MCNC: 290 MG/DL (ref 70–110)
GLUCOSE SERPL-MCNC: 324 MG/DL (ref 70–110)
HCT VFR BLD AUTO: 26.6 % (ref 37–48.5)
HGB BLD-MCNC: 9.6 G/DL (ref 12–16)
MCH RBC QN AUTO: 28.8 PG (ref 27–31)
MCHC RBC AUTO-ENTMCNC: 36.1 G/DL (ref 32–36)
MCV RBC AUTO: 80 FL (ref 82–98)
PLATELET # BLD AUTO: 324 K/UL (ref 150–450)
PMV BLD AUTO: 11.1 FL (ref 9.2–12.9)
POTASSIUM SERPL-SCNC: 4.5 MMOL/L (ref 3.5–5.1)
RBC # BLD AUTO: 3.33 M/UL (ref 4–5.4)
SODIUM SERPL-SCNC: 135 MMOL/L (ref 136–145)
WBC # BLD AUTO: 16.8 K/UL (ref 3.9–12.7)

## 2023-08-07 PROCEDURE — 85027 COMPLETE CBC AUTOMATED: CPT | Performed by: INTERNAL MEDICINE

## 2023-08-07 PROCEDURE — 25000003 PHARM REV CODE 250: Performed by: INTERNAL MEDICINE

## 2023-08-07 PROCEDURE — 94761 N-INVAS EAR/PLS OXIMETRY MLT: CPT

## 2023-08-07 PROCEDURE — 63600175 PHARM REV CODE 636 W HCPCS: Performed by: INTERNAL MEDICINE

## 2023-08-07 PROCEDURE — 99900031 HC PATIENT EDUCATION (STAT)

## 2023-08-07 PROCEDURE — 36415 COLL VENOUS BLD VENIPUNCTURE: CPT | Performed by: INTERNAL MEDICINE

## 2023-08-07 PROCEDURE — 92526 ORAL FUNCTION THERAPY: CPT

## 2023-08-07 PROCEDURE — 80048 BASIC METABOLIC PNL TOTAL CA: CPT | Performed by: INTERNAL MEDICINE

## 2023-08-07 PROCEDURE — 21000000 HC CCU ICU ROOM CHARGE

## 2023-08-07 PROCEDURE — 99900035 HC TECH TIME PER 15 MIN (STAT)

## 2023-08-07 PROCEDURE — 94760 N-INVAS EAR/PLS OXIMETRY 1: CPT

## 2023-08-07 PROCEDURE — C9113 INJ PANTOPRAZOLE SODIUM, VIA: HCPCS | Performed by: INTERNAL MEDICINE

## 2023-08-07 RX ADMIN — DEXAMETHASONE SODIUM PHOSPHATE 4 MG: 4 INJECTION, SOLUTION INTRA-ARTICULAR; INTRALESIONAL; INTRAMUSCULAR; INTRAVENOUS; SOFT TISSUE at 09:08

## 2023-08-07 RX ADMIN — LEVETIRACETAM 1000 MG: 10 INJECTION INTRAVENOUS at 09:08

## 2023-08-07 RX ADMIN — HYDRALAZINE HYDROCHLORIDE 10 MG: 20 INJECTION, SOLUTION INTRAMUSCULAR; INTRAVENOUS at 07:08

## 2023-08-07 RX ADMIN — METOPROLOL SUCCINATE 100 MG: 50 TABLET, EXTENDED RELEASE ORAL at 09:08

## 2023-08-07 RX ADMIN — PANTOPRAZOLE SODIUM 40 MG: 40 INJECTION, POWDER, FOR SOLUTION INTRAVENOUS at 09:08

## 2023-08-07 RX ADMIN — LEUCINE, PHENYLALANINE, LYSINE, METHIONINE, ISOLEUCINE, VALINE, HISTIDINE, THREONINE, TRYPTOPHAN, ALANINE, GLYCINE, ARGININE, PROLINE, SERINE, TYROSINE, DEXTROSE 2000 ML: 311; 238; 247; 170; 255; 247; 204; 179; 77; 880; 438; 489; 289; 213; 17; 5 INJECTION INTRAVENOUS at 03:08

## 2023-08-07 RX ADMIN — AMLODIPINE BESYLATE 10 MG: 5 TABLET ORAL at 09:08

## 2023-08-07 RX ADMIN — INSULIN DETEMIR 20 UNITS: 100 INJECTION, SOLUTION SUBCUTANEOUS at 09:08

## 2023-08-07 RX ADMIN — ENOXAPARIN SODIUM 40 MG: 40 INJECTION SUBCUTANEOUS at 05:08

## 2023-08-07 RX ADMIN — LEUCINE, PHENYLALANINE, LYSINE, METHIONINE, ISOLEUCINE, VALINE, HISTIDINE, THREONINE, TRYPTOPHAN, ALANINE, GLYCINE, ARGININE, PROLINE, SERINE, TYROSINE, DEXTROSE 2000 ML: 311; 238; 247; 170; 255; 247; 204; 179; 77; 880; 438; 489; 289; 213; 17; 5 INJECTION INTRAVENOUS at 02:08

## 2023-08-07 NOTE — PT/OT/SLP PROGRESS
Occupational Therapy      Patient Name:  Steff Johnson   MRN:  [id number]    Patient not seen today secondary to  (Hold per RN.). Will follow-up tomorrow.    8/7/2023

## 2023-08-07 NOTE — PROGRESS NOTES
Spoke with nurse Paul. 24 hour exam was not begun until afternoon yesterday, so mri can not be done at this time. Paul said he will call when patient is done with her test, will get patient for MRI at that time.

## 2023-08-07 NOTE — PT/OT/SLP PROGRESS
Physical Therapy      Patient Name:  Steff Johnson   MRN:  [id number]    Patient not seen today secondary to Nurse/ SAIMA hold. Will follow-up 8/8/23.

## 2023-08-07 NOTE — PT/OT/SLP PROGRESS
Speech Language Pathology Treatment    Patient Name:  Steff Johnson   MRN:  [id number]  Admitting Diagnosis: AMS (altered mental status)    Recommendations:                 General Recommendations:  Dysphagia therapy and Cognitive-linguistic therapy  Diet recommendations:  NPO, Pleasure Feeding (puree and tsp sips water pleasure feeds), Liquid Diet Level: NPO   Aspiration Precautions:  Pleasure feeds w/ RN if pt fully awake/alert and accepting of PO, Frequent oral care, and HOB to 90 degrees     General Precautions: Standard, NPO  Communication strategies:  yes/no questions only and provide increased time to answer    Assessment:     Steff Johnson is a 63 y.o. female with an SLP diagnosis of Aphasia and Dysphagia.  She presents with improved verbal communication, though expression and understanding remain significantly impaired. Improved tolerance of thin liquids and purees today, though pt continues to demonstrate moderate oral deficits impacting swallowing safety. Suspected oral apraxia today given pt's delayed oral movements and difficulty completing oral commands. Rec continue NPO w/ pleasure feeds from nursing only if pt awake and alert.    Subjective     Pt awake laying in bed w/ EEG monitoring in place. Pt verbalized agreement to ST.  Patient goals: none stated     Pain/Comfort:       Respiratory Status: Room air    Objective:     Has the patient been evaluated by SLP for swallowing?   Yes  Keep patient NPO? Yes   Current Respiratory Status:        Pt verbally responded to greeting and intermittently responded to yes/no questions re wants/needs. Improved ability to mobilize oral structures and follow directives for [facility]. Findings below:    Oral Musculature Evaluation   Oral Musculature facial asymmetry present  (R side residual from past CVA)   Dentition present and adequate   Secretion Management adequate   Mucosal Quality adequate   Mandibular Strength and Mobility other (see  "comments)  (percevied difficulty coordinating mandibular depression)   Oral Labial Strength and Mobility impaired retraction;impaired pursing;impaired coordination;functional seal   Lingual Strength and Mobility other (see comments)  (unable to assess strength)   Velar Elevation other (see comments)  (unable to visualize; decreased mandibular depression)   Buccal Strength and Mobility WFL   Volitional Cough weak   Volitional Swallow unable to obtain   Voice Prior to PO Intake clear, hypernasal     Pt demonstrated difficulty understanding and executing a majority of commands, however. Expression limited as well. Suspected apraxia of speech and/or oral apraxia due to performance during oral mech and PO trials, as pt unable to complete movements on command even w/ visual model. Receptive deficits may also be impacting performance on oral mech exam, however; further evaluation of receptive deficits v. Apraxia warranted.    Pt observed during trials of thin liquids and purees. Adequate tolerance of ice chip, tsp sips water and straw used as pipette. Pt noted to suck straw in 1/3 attempts for typical straw presentation. She continues w/ decreased labial closure around utensils and sweeping of bolus from spoon. Oral holding during pureed trials; cues to swallow resulted in pt saying "swallow" and "ah" rather than swallowing. Eventual oral transport/pharyngeal swallow initiation. No overt s/s aspiration noted across trials; adequate laryngeal elevation/excursion noted via palpation across trials.    Goals:   Multidisciplinary Problems       SLP Goals          Problem: SLP    Goal Priority Disciplines Outcome   SLP Goal     SLP Ongoing, Progressing   Description: 1. Pt will participate in ongoing assessment of communication and swallow.  2. Pt will tolerate a small ice chip in her mouth without adverse response/turning away (allowing placement of ice chip).  3.  Pt will produce voice with model x 20% of trials.  4.  Pt will " respond to yes/no questions with 60% accuracy via any modality.                         Plan:     Patient to be seen:  6 x/week   Plan of Care expires:  08/26/23  Plan of Care reviewed with:  patient, other (see comments) (nursing)   SLP Follow-Up:  Yes       Discharge recommendations:  rehabilitation facility   Barriers to Discharge:   Nutritional Status    Time Tracking:     SLP Treatment Date:   08/07/23  Speech Start Time:  1009  Speech Stop Time:  1032     Speech Total Time (min):  23 min    Billable Minutes: Treatment Swallowing Dysfunction 23 min    08/07/2023

## 2023-08-07 NOTE — PLAN OF CARE
08/06/23 2048   Patient Assessment/Suction   Level of Consciousness (AVPU) alert   Respiratory Effort Normal;Unlabored   PRE-TX-O2   Device (Oxygen Therapy) room air   SpO2 97 %   Pulse Oximetry Type Continuous   $ Pulse Oximetry - Multiple Charge Pulse Oximetry - Multiple   Pulse 61   Resp 16   Education   $ Education 15 min   Respiratory Evaluation   $ Care Plan Tech Time 15 min

## 2023-08-07 NOTE — PROCEDURES
CONTINUOUS VIDEO EEG MONITORING REPORT    NAME: Steff Johnson  : 1960  MRN: [id number]    DATE of EE2023 2:40 P.M. TO 2023 1:40 P.M.  Duration OF EE hours    CLINICAL INDICATION: This is a 63 y.o. female with history of stroke, residual weakness and aphasia, being evaluated for altered mental status and seizure-like activity.     MEDICATIONS:    EEG DESCRIPTION:  This is continues EEG monitoring for 23 hours.  A 16-channel EEG was performed with electrode placement in 10/20 International System. Longitudinal bipolar and referential montages were utilized in analysis.    This is a technically adequate study with minor muscle and motion artifacts.    The dominant posterior background rhythm was a theta frequency rhythm.  Intermittently throughout the record, there were generalized periodic discharges with triphasic morphology.  At times generalized periodic discharges had sharp and spike/wave morphology. The record was reactive to eye opening and closure. Anterior derivations showed the expected admixture of low-voltage beta and theta frequencies as well as intermittent eye blink artifact. Drowsiness was characterized by voltage attenuation and lateral eye movements.  Sleep architecture was symmetric and consisted of sleep spindles and K complexes.    At times the generalized periodic discharges had epileptiform potential in the form of sharp waves and spike and waves however there was no evolution into seizures. No electrographic or electroclinical seizures were recorded.    Impression: This is an abnormal long-term video EEG due to presence of  Moderate generalized slowing indicating moderate encephalopathy  Generalized periodic discharges with triphasic morphology and at times with sharp/spike wave morphology indicating seizure potential.  No electrographic/electroclinical seizures were recorded      Geoffrey Roque MD  Neurology

## 2023-08-07 NOTE — SUBJECTIVE & OBJECTIVE
Interval History: very glacial improvement    Review of Systems   Unable to perform ROS: Patient nonverbal     Objective:     Vital Signs (Most Recent):  Temp: 97.9 °F (36.6 °C) (08/07/23 1501)  Pulse: (!) 52 (08/07/23 1101)  Resp: (!) 22 (08/07/23 1101)  BP: (!) 170/72 (08/07/23 1501)  SpO2: 98 % (08/07/23 1700) Vital Signs (24h Range):  Temp:  [97.2 °F (36.2 °C)-100.9 °F (38.3 °C)] 97.9 °F (36.6 °C)  Pulse:  [48-66] 52  Resp:  [10-22] 22  SpO2:  [96 %-100 %] 98 %  BP: (170-202)/() 170/72     Weight: 55.7 kg (122 lb 12.7 oz)  Body mass index is 24.8 kg/m².    Intake/Output Summary (Last 24 hours) at 8/7/2023 1811  Last data filed at 8/7/2023 1707  Gross per 24 hour   Intake --   Output 2200 ml   Net -2200 ml         Physical Exam  Vitals and nursing note reviewed.   Constitutional:       Appearance: She is well-developed.   HENT:      Head: Normocephalic and atraumatic.      Right Ear: External ear normal.      Left Ear: External ear normal.      Nose: Nose normal.   Eyes:      Conjunctiva/sclera: Conjunctivae normal.      Pupils: Pupils are equal, round, and reactive to light.   Cardiovascular:      Rate and Rhythm: Normal rate and regular rhythm.      Heart sounds: Normal heart sounds.   Pulmonary:      Effort: Pulmonary effort is normal.      Breath sounds: Normal breath sounds.   Abdominal:      General: Bowel sounds are normal.      Palpations: Abdomen is soft.   Musculoskeletal:         General: Normal range of motion.      Cervical back: Normal range of motion and neck supple.   Skin:     General: Skin is warm and dry.      Capillary Refill: Capillary refill takes less than 2 seconds.   Neurological:      Mental Status: She is alert.      Comments: CN grossly intact; no extremity movements; no response to verbal stimuli    Psychiatric:      Comments: Unable to assess             Significant Labs: All pertinent labs within the past 24 hours have been reviewed.  CBC:   Recent Labs   Lab 08/06/23  1549  08/07/23 0448   WBC 12.02 16.80*   HGB 9.1* 9.6*   HCT 25.5* 26.6*    324     CMP:   Recent Labs   Lab 08/06/23  0410 08/07/23 0448    135*   K 4.4 4.5   * 114*   CO2 20* 18*   * 324*   BUN 39* 40*   CREATININE 1.1 0.9   CALCIUM 8.6* 8.5*   ANIONGAP 7* 3*       Significant Imaging: I have reviewed all pertinent imaging results/findings within the past 24 hours.

## 2023-08-07 NOTE — PROGRESS NOTES
[facility]  Progress Note    Patient Name: Steff Johnson  MRN: [id number]  Patient Class: IP- Inpatient   Admission Date: 8/4/2023  Length of Stay: 3 days  Attending Physician: Nish Abarca MD  Primary Care Provider: Cristina Ren MD        Subjective:     Principal Problem:AMS (altered mental status)        HPI:  Steff Johnson is a 63 y.o. Black or  female   With PMH of stroke with expressive aphasia, right upper extremity paralysis, HTN, DM, COPD.    who presents with worsening AMS.      Patient was diagnosed with COVID a week ago. Patient currently not answering any questions, does not follow commands. Per discussion with ED provider and chart review.      Per her daughter, she  her upfrom physical rehab by her daughter today at approximately 1:00 p.m. and notice that she was not speaking as much as she normally does at home.  At baseline, per her daughter, the patient is able to speak in short phrases.  She does not complete sentences.  Patient arrives in the ED was brought back to the [facility] where she was noted to have a fixed left gaze and not be as interactive as she normally is.       In the ED, patient was hemodynamically stable, CT head and CTA head and neck was negative. Patient was loaded with Keppra. Neuro was consulted from the ED. Recommended admission for EEG and MRI brain.          Overview/Hospital Course:  08/05  Assumed care.Chart reviewed. Consultant's attendance/procedure noted and appreciated. SLP attendance noted/appreciated.  Labs reviewed and noted fully below: leukocytosis improving with stable normocytic anemia; normal cations with improving stage 3a renal dysfunction. Telemetry reviewed: sinus.  (boyfriend X 20 years) at bedside. Patient remains encephalopathic. Receiving levetiracetam, no further seizure like activity noted.  Plan: Continue current regimen/course; Neurology consultation pending; will start low  "dose  dexamethasone, and Clinamix; AM labs for review.    08/06  Consultant's attendance noted and appreciated. Labs reviewed and noted fully below: no leukocytosis with mild microcytic anemia; normal electrolytes with stage 3a renal dysfunction (normal TSH noted). Telemetry reviewed: sinus. Patient is awake with eyes open. However she does not respond. RN states she tracked with eyes to left for Neuro, and took a "Teaspoon of water" for SLP but no purposeful response since then. MR Brain ordered, yet pending. Receiving EEG currently (started just after exam).  Plan: Continue current course/regimen--adding PPI because she is receiving dexamethasone; for MRI; AM labs for review    08/07  Discussed patient with Neurology: not true status, but epileptiform seen on EEG. Reloaded with levetiracetam, and given lorazepam yesterday. Labs reviewed and noted below: mild leukocytosis with stable mild microcytic anemia; trivial hyponatremia with normal renal function and moderate prerenal azotemia. MRI Brain ordered, yet pending. Telemetry reviewed: sinus. Patient remains encephalopathic and non-communicative. Plan: Continue current course; for MRI this evening; AM labs for review; will need long term placement      Interval History: very glacial improvement    Review of Systems   Unable to perform ROS: Patient nonverbal     Objective:     Vital Signs (Most Recent):  Temp: 97.9 °F (36.6 °C) (08/07/23 1501)  Pulse: (!) 52 (08/07/23 1101)  Resp: (!) 22 (08/07/23 1101)  BP: (!) 170/72 (08/07/23 1501)  SpO2: 98 % (08/07/23 1700) Vital Signs (24h Range):  Temp:  [97.2 °F (36.2 °C)-100.9 °F (38.3 °C)] 97.9 °F (36.6 °C)  Pulse:  [48-66] 52  Resp:  [10-22] 22  SpO2:  [96 %-100 %] 98 %  BP: (170-202)/() 170/72     Weight: 55.7 kg (122 lb 12.7 oz)  Body mass index is 24.8 kg/m².    Intake/Output Summary (Last 24 hours) at 8/7/2023 1811  Last data filed at 8/7/2023 1707  Gross per 24 hour   Intake --   Output 2200 ml   Net -2200 ml "         Physical Exam  Vitals and nursing note reviewed.   Constitutional:       Appearance: She is well-developed.   HENT:      Head: Normocephalic and atraumatic.      Right Ear: External ear normal.      Left Ear: External ear normal.      Nose: Nose normal.   Eyes:      Conjunctiva/sclera: Conjunctivae normal.      Pupils: Pupils are equal, round, and reactive to light.   Cardiovascular:      Rate and Rhythm: Normal rate and regular rhythm.      Heart sounds: Normal heart sounds.   Pulmonary:      Effort: Pulmonary effort is normal.      Breath sounds: Normal breath sounds.   Abdominal:      General: Bowel sounds are normal.      Palpations: Abdomen is soft.   Musculoskeletal:         General: Normal range of motion.      Cervical back: Normal range of motion and neck supple.   Skin:     General: Skin is warm and dry.      Capillary Refill: Capillary refill takes less than 2 seconds.   Neurological:      Mental Status: She is alert.      Comments: CN grossly intact; no extremity movements; no response to verbal stimuli    Psychiatric:      Comments: Unable to assess             Significant Labs: All pertinent labs within the past 24 hours have been reviewed.  CBC:   Recent Labs   Lab 08/06/23  0410 08/07/23  0448   WBC 12.02 16.80*   HGB 9.1* 9.6*   HCT 25.5* 26.6*    324     CMP:   Recent Labs   Lab 08/06/23  0410 08/07/23  0448    135*   K 4.4 4.5   * 114*   CO2 20* 18*   * 324*   BUN 39* 40*   CREATININE 1.1 0.9   CALCIUM 8.6* 8.5*   ANIONGAP 7* 3*       Significant Imaging: I have reviewed all pertinent imaging results/findings within the past 24 hours.      Assessment/Plan:      No notes have been filed under this hospital service.  Service: Hospital Medicine    VTE Risk Mitigation (From admission, onward)         Ordered     enoxaparin injection 40 mg  Every 24 hours         08/05/23 1838     IP VTE HIGH RISK PATIENT  Once         08/04/23 2233     Place sequential compression  device  Until discontinued         08/04/23 2233                Discharge Planning   ALEX: 8/10/2023     Code Status: Full Code   Is the patient medically ready for discharge?:     Reason for patient still in hospital (select all that apply): Patient trending condition, Laboratory test and Treatment  Discharge Plan A: Home with family                  Nish Abarca MD  Department of Hospital Medicine   [facility] [place]   English

## 2023-08-07 NOTE — PROGRESS NOTES
[facility]  Department of Neurology  Progress Note  Date: 2023 2:55 PM          Patient Name: Steff Johnson   MRN: [id number]   : 1960    AGE: 63 y.o.    LOS: 3 days Hospital Day: 4  Admit date: 2023  6:11 PM        HPI per EMR: Steff Johnson is a 63 y.o. female with a history of  stroke with expressive aphasia, right upper extremity paralysis, HTN, DM, COPD.    who presents with worsening AMS.      Patient was diagnosed with COVID a week ago. Patient currently not answering any questions, does not follow commands. Per discussion with ED provider and chart review.      Per her daughter, she  her upfrom physical rehab by her daughter today at approximately 1:00 p.m. and notice that she was not speaking as much as she normally does at home.  At baseline, per her daughter, the patient is able to speak in short phrases.  She does not complete sentences.  Patient arrives in the ED was brought back to the [facility] [place] where she was noted to have a fixed left gaze and not be as interactive as she normally is.       In the ED, patient was hemodynamically stable, CT head and CTA head and neck was negative. Patient was loaded with Keppra in ER with concern for seizures.  Admitted to the hospital for further workup.     Neurology consult:  Patient was seen examined by me.  She tracks however does not answer to questions or follow commands.  Patient's  is at bedside states that he has been like this since her recent admission to the hospital and only would make few words which has also been decreased in the last few days for which he brought to the hospital.  He states that mental status has been worse in the last few days and he noticed that yesterday she was not speaking at all.     In the ER, she was noticed to have fixed left gaze and not as interactive for which a stat EEG was done showed no seizures.  CT head was done showed no acute intracranial pathology and CT angiogram head  and neck showed no large vessel occlusion/high-grade stenosis.     She was recently admitted to the hospital in July 2023 for generalized weakness, dysarthria which is worsened from baseline.  She had an MRI brain at that time showed a small right frontal acute ischemic stroke.  She was discharged home on dual antiplatelet therapy per Neurology recommendations at that time.  Her mental status at that time was awake and oriented x4 with speech being dysarthric and making only few words at a time.  Her examined now seems to be significantly different compared to her prior neurology encounter.     08/06/2023: No acute events overnight. Patient was seen and examined by me this morning. Neuro exam is unchanged. She has L gaze preference and tracks but does not follow or answer to questions    08/07/2023:  Patient was seen examined by me.  Mental status is improved today and she is awake, able to tell me her name and follow commands.  Right upper extremity has increased tone and weak compared to the left upper.       Vitals:  Patient Vitals for the past 24 hrs:   BP Temp Temp src Pulse Resp SpO2   08/07/23 0900 -- -- -- -- -- 100 %   08/07/23 0720 -- -- -- (!) 48 10 100 %   08/07/23 0701 (!) 193/85 97.9 °F (36.6 °C) -- (!) 48 11 100 %   08/07/23 0700 -- -- -- -- -- 100 %   08/07/23 0348 -- 97.2 °F (36.2 °C) Axillary -- -- --   08/07/23 0315 (!) 189/87 -- -- 60 12 99 %   08/07/23 0200 (!) 174/81 -- -- (!) 53 12 98 %   08/07/23 0100 (!) 190/77 -- -- (!) 56 14 99 %   08/07/23 0000 (!) 202/86 -- -- (!) 58 14 97 %   08/06/23 2319 -- 99.4 °F (37.4 °C) Axillary -- -- --   08/06/23 2300 (!) 175/93 99.4 °F (37.4 °C) Axillary (!) 58 17 96 %   08/06/23 2244 (!) 175/93 -- -- -- -- --   08/06/23 2214 (!) 189/112 -- -- -- -- --   08/06/23 2100 (!) 174/72 -- -- (!) 58 18 96 %   08/06/23 2048 -- -- -- 61 16 97 %   08/06/23 2000 (!) 184/78 -- -- 62 17 96 %   08/06/23 1933 -- (!) 100.9 °F (38.3 °C) Axillary -- -- --   08/06/23 1900 (!)  181/77 -- -- 66 17 96 %   08/06/23 1701 (!) 166/70 99 °F (37.2 °C) -- 62 16 97 %   08/06/23 1700 (!) 166/70 -- -- 63 18 95 %   08/06/23 1501 (!) 154/68 -- -- 66 16 95 %   08/06/23 1500 -- -- -- -- -- 96 %   08/06/23 1226 (!) 160/70 -- -- -- -- 95 %   08/06/23 1111 (!) 179/74 99 °F (37.2 °C) -- 74 18 96 %   08/06/23 1101 (!) 193/81 99 °F (37.2 °C) -- 78 18 96 %     PHYSICAL EXAM:      GENERAL APPEARANCE: Alert, well-developed, female in no acute distress.  HEENT: Normocephalic and atraumatic. PERRL. Oropharynx unremarkable.  PULM: Normal respiratory effort. No accessory muscle use.  CV: RRR.  ABDOMEN: Soft, nontender.  EXTREMITIES: No obvious signs of vascular compromise. Pulses present. No cyanosis, clubbing or edema.  SKIN: Clear; no rashes, lesions or skin breaks in exposed areas.     NEURO:  MENTAL STATUS:  She is awake, tracks and able to tell me her name.  She follows some commands.  She responds however slow  CRANIAL NERVES:  Pupils equal round and reactive to light.  Extraocular movements are intact, face is with mild right facial droop     MOTOR:  Bulk normal. Tone increase in right upper extremity  Abnormal movements absent.  Tremor: none present.  Strength:  Antigravity in left upper extremity and left lower extremity.  Exact strength can not be assessed as patient was slow.  Increased tone in the right upper extremity was noted     REFLEXES:  DTRs 1+ throughout.  Plantar response downgoing bilaterally.  SENSATION: not tested.  COORDINATION:  Could not be tested .  STATION: not tested.  GAIT: not tested.       CURRENT SCHEDULED MEDICATIONS:   amLODIPine  10 mg Oral Daily    aspirin  81 mg Oral Daily    atorvastatin  80 mg Oral QHS    dantrolene  50 mg Oral TID    dexAMETHasone  4 mg Intravenous Q12H    enoxparin  40 mg Subcutaneous Q24H (prophylaxis, 1700)    EScitalopram oxalate  10 mg Oral QHS    insulin detemir U-100 (Levemir)  20 Units Subcutaneous QHS    levETIRAcetam (Keppra) IV (PEDS and ADULTS)   "1,000 mg Intravenous Q12H    metoprolol succinate  100 mg Oral Daily    modafiniL  200 mg Oral Daily    mupirocin   Nasal BID    pantoprazole  40 mg Intravenous Daily     CURRENT INFUSIONS:   amino acid 4.25 % in D5W 2,000 mL (08/07/23 0317)     DATA:  Recent Labs   Lab 08/04/23 1831 08/05/23 0408 08/06/23 0410 08/07/23 0448    144 142 135*   K 5.0 4.3 4.4 4.5   * 116* 115* 114*   CO2 22* 20* 20* 18*   BUN 37* 31* 39* 40*   CREATININE 1.6* 1.2 1.1 0.9   * 106 276* 324*   CALCIUM 8.9 8.6* 8.6* 8.5*   AST 34  --   --   --    ALT 44  --   --   --      Recent Labs   Lab 08/04/23 1831 08/05/23 0408 08/06/23 0410 08/07/23 0448   WBC 16.23* 12.72* 12.02 16.80*   HGB 10.7* 10.0* 9.1* 9.6*   HCT 30.7* 28.6* 25.5* 26.6*    315 359 324     No results found for: "PROTEINCSF", "GLUCCSF", "WBCCSF", "RBCCSF", "PMNCSF"  Hemoglobin A1C   Date Value Ref Range Status   08/05/2023 6.4 (H) 4.5 - 6.2 % Final     Comment:     According to ADA guidelines, hemoglobin A1C <7.0% represents  optimal control in non-pregnant diabetic patients.  Different  metrics may apply to specific populations.   Standards of Medical Care in Diabetes - 2016.    For the purpose of screening for the presence of diabetes:  <5.7%     Consistent with the absence of diabetes  5.7-6.4%  Consistent with increasing risk for diabetes   (prediabetes)  >or=6.5%  Consistent with diabetes    Currently no consensus exists for use of hemoglobin A1C  for diagnosis of diabetes for children.     05/03/2023 10.2 (H) 4.5 - 6.2 % Final     Comment:     According to ADA guidelines, hemoglobin A1C <7.0% represents  optimal control in non-pregnant diabetic patients.  Different  metrics may apply to specific populations.   Standards of Medical Care in Diabetes - 2016.    For the purpose of screening for the presence of diabetes:  <5.7%     Consistent with the absence of diabetes  5.7-6.4%  Consistent with increasing risk for diabetes "   (prediabetes)  >or=6.5%  Consistent with diabetes    Currently no consensus exists for use of hemoglobin A1C  for diagnosis of diabetes for children.     02/22/2023 8.9 (H) 4.0 - 5.6 % Final     Comment:     ADA Screening Guidelines:  5.7-6.4%  Consistent with prediabetes  >or=6.5%  Consistent with diabetes    High levels of fetal hemoglobin interfere with the HbA1C  assay. Heterozygous hemoglobin variants (HbS, HgC, etc)do  not significantly interfere with this assay.   However, presence of multiple variants may affect accuracy.              I have personally reviewed and interpreted the pertinent imaging and lab results.  Imaging Results              US Carotid Bilateral (Final result)  Result time 08/04/23 20:42:04      Final result by Brooke Cottrell MD (08/04/23 20:42:04)                   Narrative:    PROCEDURE:    US CAROTID DOPPLER BILATERAL  dated  8/4/2023 7:16 PM CDT    CLINICAL HISTORY:   Female 63 years of age.    TECHNIQUE:  Multiple sagittal and transverse images of the carotid arterial system of the neck were obtained.  2-D vascular structure, Doppler spectral analysis and color flow Doppler imaging was performed.  All measurements and percent stenoses described below were determined using NASCET criteria or criteria similar to NASCET, as defined by the Society of Radiologist in Ultrasound Consensus Conference Radiology 2003.    PREVIOUS STUDIES:  None Available    FINDINGS:    Right carotid system:  There is no plaque. The peak systolic velocity in the right CCA is 96 cm/sec.  The peak systolic velocity within the internal carotid artery is 81 cm/sec.  The ICA/CCA ratio is calculated as 0.8.  The external carotid artery is patent with antegrade flow. Antegrade flow is present in the vertebral artery.    Left carotid system:  There is no plaque.  The peak systolic velocity in the left CCA is 110 cm/sec.  The peak systolic velocity within the internal carotid artery is 76 cm/sec.  The ICA/CCA  ratio is calculated as 0.7.  The external carotid artery is patent with antegrade flow. Antegrade flow is present in the vertebral artery.    IMPRESSION:    Normal bilateral carotid ultrasound.    Electronically signed by:  Brooke Jade MD  8/4/2023 8:42 PM CDT Workstation: [id number]                                     X-Ray Chest AP Portable (Final result)  Result time 08/04/23 19:09:18      Final result by Rj Kellogg MD (08/04/23 19:09:18)                   Narrative:    History: Stroke.    FINDINGS: AP view of the chest is compared to previous study dated July 29, 2023. No focal infiltrate or pleural effusion is seen. Cardiac silhouette is enlarged. Bony thorax appears intact.    IMPRESSION:  1. No acute cardiopulmonary change seen.    Electronically signed by:  Rj Kellogg MD  8/4/2023 7:09 PM CDT Workstation: [id number]                                     CTA Head and Neck (xpd) (Final result)  Result time 08/04/23 18:55:02      Final result by Rj Kellogg MD (08/04/23 18:55:02)                   Narrative:    CMS MANDATED QUALITY DATA - CT RADIATION - 436    All CT scans at this facility use dose modulation, iterative-reconstruction, and/or weight-based dosing when appropriate to reduce radiation dose to as low as reasonably achievable.    CMS MANDATED QUALITY [id number]    NASCET CRITERIA WAS UTILIZED FOR ESTIMATION OF STENOSIS SEVERITY WITH THE NARROWEST SEGMENT BEING COMPARED TO THE DISTAL LUMINAL DIAMETER.        HISTORY:Stroke/TIA. Determined embolic source.    TECHNIQUE: Serial axial images were obtained from aortic arch through the vertex with 100 cc of Omnipaque 350 intravenous contrast utilizing a CT angiography protocol. Coronal and sagittal MIP CT angiography reconstructions were performed. Patient received approximately 158 mGy-cm of radiation exposure from this exam.    COMPARISON: Comparison to previous study dated July 4, 2023.    FINDINGS:Aortic arch is  unremarkable. Great vessel origins appear widely patent. Ectatic patent cervical carotid arteries are visualized. No evidence of significant cervical carotid artery stenosis is seen. Minimal atherosclerotic changes are present at the left cervical carotid bifurcation. Codominant patent vertebral arteries are visualized.    Internal carotid arteries appear widely patent at the skull base. Codominant vertebral arteries are present at the skull base with a widely patent basilar artery. Bilateral anterior, middle, and posterior cerebral arteries appear patent without evidence of significant stenosis. Patent bilateral posterior communicating arteries are identified.    IMPRESSION:  1. No evidence of cervical carotid/vertebral artery occlusion or significant stenosis.  2. No evidence of large vessel intracranial arterial occlusion or significant stenosis is seen.          Electronically signed by:  Rj Kellogg MD  8/4/2023 6:55 PM CDT Workstation: [id number]                                     CT Head Without Contrast (Edited Result - FINAL)  Result time 08/04/23 19:36:33      Edited Result - FINAL by Fozia Le DO (08/04/23 19:36:33)                   Narrative:         ADDENDUM #1       This report contains critical findings that may be critical to patient care.    At 6:47 PM CST on 8/4/2023 , the report findings were confirmed with Dr. Torrez has received exam report, is aware of the finding(s), and indicated that a call was not necessary to discuss exam findings.        Electronically signed by:  Fozia Le DO  8/4/2023 7:36 PM CDT Workstation: [id number]     ORIGINAL REPORT       CT of the head: 8/4/2023 6:40 PM CDT    HISTORY: 63 years  old Female with Neuro deficit, acute, stroke suspected. Patient found unresponsive, altered mental status    COMPARISON: 7/3/2023    TECHNIQUE: Multiple axial contiguous images were obtained through the head without intravenous contrast administered. This exam was  performed according to our departmental dose-optimization program, which includes automated exposure control, adjustment of the mA and/or KV according to the patient's size and/or use of iterative reconstruction technique.    FINDINGS: The ventricles and cerebral sulci demonstrate mild prominence, consistent with cerebral atrophy. There are moderate periventricular hypodensities, suggestive of periventricular white matter changes.    There are similar hypodensities at the left frontal lobe and left basal ganglia.    The gray-white matter differentiation is within normal limits.    Both globes appear symmetric.    The mastoid air cells appear clear.    The visualized paranasal sinuses appear clear.    The calvarium is intact.    No extra-axial fluid collection is seen.    No midline shift or mass effect is apparent.    There are no findings to suggest acute intracranial hemorrhage.    IMPRESSION: 1. No acute intracranial hemorrhage is seen.  2. There is mild to moderate cerebral atrophy and periventricular white matter changes are seen.    Electronically signed by:  Fozia Le DO  8/4/2023 6:41 PM CDT Workstation: [id number]                      Final result by Fozia Le DO (08/04/23 18:41:20)                   Narrative:    CT of the head: 8/4/2023 6:40 PM CDT    HISTORY: 63 years  old Female with Neuro deficit, acute, stroke suspected. Patient found unresponsive, altered mental status    COMPARISON: 7/3/2023    TECHNIQUE: Multiple axial contiguous images were obtained through the head without intravenous contrast administered. This exam was performed according to our departmental dose-optimization program, which includes automated exposure control, adjustment of the mA and/or KV according to the patient's size and/or use of iterative reconstruction technique.    FINDINGS: The ventricles and cerebral sulci demonstrate mild prominence, consistent with cerebral atrophy. There are moderate periventricular  hypodensities, suggestive of periventricular white matter changes.    There are similar hypodensities at the left frontal lobe and left basal ganglia.    The gray-white matter differentiation is within normal limits.    Both globes appear symmetric.    The mastoid air cells appear clear.    The visualized paranasal sinuses appear clear.    The calvarium is intact.    No extra-axial fluid collection is seen.    No midline shift or mass effect is apparent.    There are no findings to suggest acute intracranial hemorrhage.    IMPRESSION: 1. No acute intracranial hemorrhage is seen.  2. There is mild to moderate cerebral atrophy and periventricular white matter changes are seen.    Electronically signed by:  Fozia Le DO  8/4/2023 6:41 PM CDT Workstation: [id number]                                            ASSESSMENT AND PLAN:          Encephalopathy  Prior stroke  Seizure like activity  COVID infection     Plan:   Admitted to the hospital with encephalopathy and unable to talk for the past few days with worsening in the last 1 day prior to presentation.  Etiology is unknown however could be seizure with postictal state versus metabolic/infectious causes versus intracranial pathology with acute CVA  CT head and CT angiogram head and neck done no significant acute abnormalities.  MRI brain ordered and pending.   Stat EEG was done showed no seizures or  Epileptiform activity.  Repeat EEG was done on 08/06 showed generalized periodic discharges with sharp waveform with seizure potential however no seizures were recorded.  2 mg Ativan was given during the study which cleared the background.  Additional loading dose of Keppra 2 g was given and maintenance dose of Keppra was increased to 1000 mg b.i.d..  Continuous long-term EEG monitoring for 23 hours showed no seizures however continues to show intermittent generalized periodic discharges and occasional epileptiform activities.  Will discontinue continuous EEG and  monitor patient clinically as mental status has been improving.  Will continue with same dose of Keppra at this time.  Aspirin, Lipitor for stroke prevention.  Will hold off on Plavix as patient does microhemorrhages and dual antiplatelet therapy increases risk of intracranial hemorrhage.  Physical and occupational therapy evaluate and treat.  Neuro checks per unit protocol.  Workup/management for metabolic/infectious causes of encephalopathy per primary team.  Will follow         Geoffrey Roque MD  Neurology/vascular Neurology  Date of Service: 08/07/2023  2:55 PM    Please note: This note was transcribed using voice recognition software. Because of this technology there are often uinintended grammatical, spelling, and other transcription errors. Please disregard these errors.

## 2023-08-08 LAB
ANION GAP SERPL CALC-SCNC: 7 MMOL/L (ref 8–16)
BUN SERPL-MCNC: 40 MG/DL (ref 8–23)
CALCIUM SERPL-MCNC: 8.7 MG/DL (ref 8.7–10.5)
CHLORIDE SERPL-SCNC: 107 MMOL/L (ref 95–110)
CO2 SERPL-SCNC: 17 MMOL/L (ref 23–29)
CREAT SERPL-MCNC: 0.8 MG/DL (ref 0.5–1.4)
ERYTHROCYTE [DISTWIDTH] IN BLOOD BY AUTOMATED COUNT: 14.3 % (ref 11.5–14.5)
EST. GFR  (NO RACE VARIABLE): >60 ML/MIN/1.73 M^2
GLUCOSE SERPL-MCNC: 183 MG/DL (ref 70–110)
GLUCOSE SERPL-MCNC: 185 MG/DL (ref 70–110)
GLUCOSE SERPL-MCNC: 290 MG/DL (ref 70–110)
GLUCOSE SERPL-MCNC: 295 MG/DL (ref 70–110)
HCT VFR BLD AUTO: 26.8 % (ref 37–48.5)
HGB BLD-MCNC: 9.6 G/DL (ref 12–16)
MCH RBC QN AUTO: 28 PG (ref 27–31)
MCHC RBC AUTO-ENTMCNC: 35.8 G/DL (ref 32–36)
MCV RBC AUTO: 78 FL (ref 82–98)
PLATELET # BLD AUTO: 369 K/UL (ref 150–450)
PMV BLD AUTO: 10.8 FL (ref 9.2–12.9)
POTASSIUM SERPL-SCNC: 4.2 MMOL/L (ref 3.5–5.1)
RBC # BLD AUTO: 3.43 M/UL (ref 4–5.4)
SODIUM SERPL-SCNC: 131 MMOL/L (ref 136–145)
WBC # BLD AUTO: 13.98 K/UL (ref 3.9–12.7)

## 2023-08-08 PROCEDURE — 99900031 HC PATIENT EDUCATION (STAT)

## 2023-08-08 PROCEDURE — 80048 BASIC METABOLIC PNL TOTAL CA: CPT | Performed by: INTERNAL MEDICINE

## 2023-08-08 PROCEDURE — 97535 SELF CARE MNGMENT TRAINING: CPT

## 2023-08-08 PROCEDURE — 25000003 PHARM REV CODE 250: Performed by: INTERNAL MEDICINE

## 2023-08-08 PROCEDURE — 92526 ORAL FUNCTION THERAPY: CPT

## 2023-08-08 PROCEDURE — 36415 COLL VENOUS BLD VENIPUNCTURE: CPT | Performed by: INTERNAL MEDICINE

## 2023-08-08 PROCEDURE — C9113 INJ PANTOPRAZOLE SODIUM, VIA: HCPCS | Performed by: INTERNAL MEDICINE

## 2023-08-08 PROCEDURE — 97112 NEUROMUSCULAR REEDUCATION: CPT

## 2023-08-08 PROCEDURE — 97167 OT EVAL HIGH COMPLEX 60 MIN: CPT

## 2023-08-08 PROCEDURE — 94760 N-INVAS EAR/PLS OXIMETRY 1: CPT

## 2023-08-08 PROCEDURE — 85027 COMPLETE CBC AUTOMATED: CPT | Performed by: INTERNAL MEDICINE

## 2023-08-08 PROCEDURE — 92507 TX SP LANG VOICE COMM INDIV: CPT

## 2023-08-08 PROCEDURE — 21000000 HC CCU ICU ROOM CHARGE

## 2023-08-08 PROCEDURE — 63600175 PHARM REV CODE 636 W HCPCS: Performed by: INTERNAL MEDICINE

## 2023-08-08 RX ORDER — METOPROLOL TARTRATE 25 MG/1
100 TABLET, FILM COATED ORAL 2 TIMES DAILY
Status: DISCONTINUED | OUTPATIENT
Start: 2023-08-08 | End: 2023-08-11 | Stop reason: HOSPADM

## 2023-08-08 RX ADMIN — INSULIN DETEMIR 20 UNITS: 100 INJECTION, SOLUTION SUBCUTANEOUS at 09:08

## 2023-08-08 RX ADMIN — AMLODIPINE BESYLATE 10 MG: 5 TABLET ORAL at 09:08

## 2023-08-08 RX ADMIN — DEXAMETHASONE SODIUM PHOSPHATE 4 MG: 4 INJECTION, SOLUTION INTRA-ARTICULAR; INTRALESIONAL; INTRAMUSCULAR; INTRAVENOUS; SOFT TISSUE at 09:08

## 2023-08-08 RX ADMIN — LEVETIRACETAM 1000 MG: 10 INJECTION INTRAVENOUS at 11:08

## 2023-08-08 RX ADMIN — MUPIROCIN 1 G: 20 OINTMENT TOPICAL at 09:08

## 2023-08-08 RX ADMIN — HYDRALAZINE HYDROCHLORIDE 10 MG: 20 INJECTION, SOLUTION INTRAMUSCULAR; INTRAVENOUS at 01:08

## 2023-08-08 RX ADMIN — PANTOPRAZOLE SODIUM 40 MG: 40 INJECTION, POWDER, FOR SOLUTION INTRAVENOUS at 09:08

## 2023-08-08 RX ADMIN — LEVETIRACETAM 1000 MG: 10 INJECTION INTRAVENOUS at 09:08

## 2023-08-08 RX ADMIN — ESCITALOPRAM OXALATE 10 MG: 10 TABLET ORAL at 09:08

## 2023-08-08 RX ADMIN — DANTROLENE SODIUM 50 MG: 25 CAPSULE ORAL at 11:08

## 2023-08-08 RX ADMIN — ASPIRIN 81 MG CHEWABLE TABLET 81 MG: 81 TABLET CHEWABLE at 09:08

## 2023-08-08 RX ADMIN — ATORVASTATIN CALCIUM 80 MG: 40 TABLET, FILM COATED ORAL at 09:08

## 2023-08-08 RX ADMIN — ENOXAPARIN SODIUM 40 MG: 40 INJECTION SUBCUTANEOUS at 05:08

## 2023-08-08 RX ADMIN — DANTROLENE SODIUM 50 MG: 25 CAPSULE ORAL at 09:08

## 2023-08-08 RX ADMIN — DANTROLENE SODIUM 50 MG: 25 CAPSULE ORAL at 03:08

## 2023-08-08 RX ADMIN — CEFTRIAXONE SODIUM 1 G: 1 INJECTION, POWDER, FOR SOLUTION INTRAMUSCULAR; INTRAVENOUS at 05:08

## 2023-08-08 NOTE — PROGRESS NOTES
[facility]  Department of Neurology  Progress Note  Date: 2023 2:55 PM          Patient Name: Steff Johnson   MRN: [id number]   : 1960    AGE: 63 y.o.    LOS: 4 days Hospital Day: 5  Admit date: 2023  6:11 PM        HPI per EMR: Steff Johnson is a 63 y.o. female with a history of  stroke with expressive aphasia, right upper extremity paralysis, HTN, DM, COPD.    who presents with worsening AMS.      Patient was diagnosed with COVID a week ago. Patient currently not answering any questions, does not follow commands. Per discussion with ED provider and chart review.      Per her daughter, she  her upfrom physical rehab by her daughter today at approximately 1:00 p.m. and notice that she was not speaking as much as she normally does at home.  At baseline, per her daughter, the patient is able to speak in short phrases.  She does not complete sentences.  Patient arrives in the ED was brought back to the [facility] where she was noted to have a fixed left gaze and not be as interactive as she normally is.       In the ED, patient was hemodynamically stable, CT head and CTA head and neck was negative. Patient was loaded with Keppra in ER with concern for seizures.  Admitted to the hospital for further workup.     Neurology consult:  Patient was seen examined by me.  She tracks however does not answer to questions or follow commands.  Patient's  is at bedside states that he has been like this since her recent admission to the hospital and only would make few words which has also been decreased in the last few days for which he brought to the hospital.  He states that mental status has been worse in the last few days and he noticed that yesterday she was not speaking at all.     In the ER, she was noticed to have fixed left gaze and not as interactive for which a stat EEG was done showed no seizures.  CT head was done showed no acute intracranial pathology and CT angiogram head  and neck showed no large vessel occlusion/high-grade stenosis.     She was recently admitted to the hospital in July 2023 for generalized weakness, dysarthria which is worsened from baseline.  She had an MRI brain at that time showed a small right frontal acute ischemic stroke.  She was discharged home on dual antiplatelet therapy per Neurology recommendations at that time.  Her mental status at that time was awake and oriented x4 with speech being dysarthric and making only few words at a time.  Her examined now seems to be significantly different compared to her prior neurology encounter.     08/06/2023: No acute events overnight. Patient was seen and examined by me this morning. Neuro exam is unchanged. She has L gaze preference and tracks but does not follow or answer to questions    08/07/2023:  Patient was seen examined by me.  Mental status is improved today and she is awake, able to tell me her name and follow commands.  Right upper extremity has increased tone and weak compared to the left upper.    8/8:  Patient was seen and examined.  Exam is almost similar to yesterday however slightly slower to respond compared to yesterday.       Vitals:  Patient Vitals for the past 24 hrs:   BP Temp Temp src Pulse Resp SpO2 Weight   08/08/23 1200 (!) 175/79 -- -- (!) 54 10 99 % --   08/08/23 1120 (!) 180/78 98 °F (36.7 °C) Axillary (!) 54 13 98 % --   08/08/23 1101 (!) 182/78 -- -- (!) 53 11 99 % --   08/08/23 1000 (!) 181/81 -- -- (!) 52 11 99 % --   08/08/23 0915 (!) 176/77 -- -- (!) 58 15 98 % --   08/08/23 0747 -- -- -- 60 12 99 % --   08/08/23 0701 (!) 184/77 -- -- 63 17 98 % --   08/08/23 0523 -- -- -- -- -- -- 55.7 kg (122 lb 12.7 oz)   08/08/23 0400 (!) 153/74 -- -- 60 16 97 % --   08/08/23 0341 -- 97.5 °F (36.4 °C) Axillary -- -- -- --   08/08/23 0300 (!) 183/81 -- -- 63 19 96 % --   08/08/23 0200 (!) 162/104 -- -- 64 16 97 % --   08/08/23 0126 (!) 212/87 -- -- -- -- -- --   08/08/23 0100 (!) 209/91 -- -- (!)  51 14 96 % --   08/08/23 0000 (!) 193/81 -- -- (!) 53 12 (!) 94 % --   08/07/23 2345 -- 98.9 °F (37.2 °C) Axillary -- -- -- --   08/07/23 2200 (!) 180/104 -- -- (!) 58 13 98 % --   08/07/23 2115 (!) 190/76 -- -- (!) 59 18 99 % --   08/07/23 1953 -- 97.3 °F (36.3 °C) Axillary -- -- -- --   08/07/23 1945 -- -- -- (!) 57 (!) 28 99 % --   08/07/23 1700 -- -- -- -- -- 98 % --   08/07/23 1501 (!) 170/72 97.9 °F (36.6 °C) -- -- -- -- --   08/07/23 1454 -- -- -- -- -- 99 % --     PHYSICAL EXAM:      GENERAL APPEARANCE: Alert, well-developed, female in no acute distress.  HEENT: Normocephalic and atraumatic. PERRL. Oropharynx unremarkable.  PULM: Normal respiratory effort. No accessory muscle use.  CV: RRR.  ABDOMEN: Soft, nontender.  EXTREMITIES: No obvious signs of vascular compromise. Pulses present. No cyanosis, clubbing or edema.  SKIN: Clear; no rashes, lesions or skin breaks in exposed areas.     NEURO:  MENTAL STATUS:  She is awake, tracks and able to tell me her name.  She follows some commands.  She responds however slow  CRANIAL NERVES:  Pupils equal round and reactive to light.  Extraocular movements are intact, face is with mild right facial droop     MOTOR:  Bulk normal. Tone increase in right upper extremity  Abnormal movements absent.  Tremor: none present.  Strength:  Antigravity in left upper extremity and left lower extremity.  Exact strength can not be assessed as patient was slow.  Increased tone in the right upper extremity was noted     REFLEXES:  DTRs 1+ throughout.  Plantar response downgoing bilaterally.  SENSATION: not tested.  COORDINATION:  Could not be tested .  STATION: not tested.  GAIT: not tested.       CURRENT SCHEDULED MEDICATIONS:   amLODIPine  10 mg Oral Daily    aspirin  81 mg Oral Daily    atorvastatin  80 mg Oral QHS    dantrolene  50 mg Oral TID    dexAMETHasone  4 mg Intravenous Q12H    enoxparin  40 mg Subcutaneous Q24H (prophylaxis, 1700)    EScitalopram oxalate  10 mg Oral QHS     "insulin detemir U-100 (Levemir)  20 Units Subcutaneous QHS    levETIRAcetam (Keppra) IV (PEDS and ADULTS)  1,000 mg Intravenous Q12H    metoprolol succinate  100 mg Oral Daily    modafiniL  200 mg Oral Daily    mupirocin   Nasal BID    pantoprazole  40 mg Intravenous Daily     CURRENT INFUSIONS:      DATA:  Recent Labs   Lab 08/04/23  1831 08/05/23 0408 08/06/23 0410 08/07/23 0448 08/08/23  0459    144 142 135* 131*   K 5.0 4.3 4.4 4.5 4.2   * 116* 115* 114* 107   CO2 22* 20* 20* 18* 17*   BUN 37* 31* 39* 40* 40*   CREATININE 1.6* 1.2 1.1 0.9 0.8   * 106 276* 324* 290*   CALCIUM 8.9 8.6* 8.6* 8.5* 8.7   AST 34  --   --   --   --    ALT 44  --   --   --   --      Recent Labs   Lab 08/04/23  1831 08/05/23 0408 08/06/23 0410 08/07/23 0448 08/08/23  0459   WBC 16.23* 12.72* 12.02 16.80* 13.98*   HGB 10.7* 10.0* 9.1* 9.6* 9.6*   HCT 30.7* 28.6* 25.5* 26.6* 26.8*    315 359 324 369     No results found for: "PROTEINCSF", "GLUCCSF", "WBCCSF", "RBCCSF", "PMNCSF"  Hemoglobin A1C   Date Value Ref Range Status   08/05/2023 6.4 (H) 4.5 - 6.2 % Final     Comment:     According to ADA guidelines, hemoglobin A1C <7.0% represents  optimal control in non-pregnant diabetic patients.  Different  metrics may apply to specific populations.   Standards of Medical Care in Diabetes - 2016.    For the purpose of screening for the presence of diabetes:  <5.7%     Consistent with the absence of diabetes  5.7-6.4%  Consistent with increasing risk for diabetes   (prediabetes)  >or=6.5%  Consistent with diabetes    Currently no consensus exists for use of hemoglobin A1C  for diagnosis of diabetes for children.     05/03/2023 10.2 (H) 4.5 - 6.2 % Final     Comment:     According to ADA guidelines, hemoglobin A1C <7.0% represents  optimal control in non-pregnant diabetic patients.  Different  metrics may apply to specific populations.   Standards of Medical Care in Diabetes - 2016.    For the purpose of screening " for the presence of diabetes:  <5.7%     Consistent with the absence of diabetes  5.7-6.4%  Consistent with increasing risk for diabetes   (prediabetes)  >or=6.5%  Consistent with diabetes    Currently no consensus exists for use of hemoglobin A1C  for diagnosis of diabetes for children.     02/22/2023 8.9 (H) 4.0 - 5.6 % Final     Comment:     ADA Screening Guidelines:  5.7-6.4%  Consistent with prediabetes  >or=6.5%  Consistent with diabetes    High levels of fetal hemoglobin interfere with the HbA1C  assay. Heterozygous hemoglobin variants (HbS, HgC, etc)do  not significantly interfere with this assay.   However, presence of multiple variants may affect accuracy.              I have personally reviewed and interpreted the pertinent imaging and lab results.  Imaging Results              US Carotid Bilateral (Final result)  Result time 08/04/23 20:42:04      Final result by Brooke Cottrell MD (08/04/23 20:42:04)                   Narrative:    PROCEDURE:    US CAROTID DOPPLER BILATERAL  dated  8/4/2023 7:16 PM CDT    CLINICAL HISTORY:   Female 63 years of age.    TECHNIQUE:  Multiple sagittal and transverse images of the carotid arterial system of the neck were obtained.  2-D vascular structure, Doppler spectral analysis and color flow Doppler imaging was performed.  All measurements and percent stenoses described below were determined using NASCET criteria or criteria similar to NASCET, as defined by the Society of Radiologist in Ultrasound Consensus Conference Radiology 2003.    PREVIOUS STUDIES:  None Available    FINDINGS:    Right carotid system:  There is no plaque. The peak systolic velocity in the right CCA is 96 cm/sec.  The peak systolic velocity within the internal carotid artery is 81 cm/sec.  The ICA/CCA ratio is calculated as 0.8.  The external carotid artery is patent with antegrade flow. Antegrade flow is present in the vertebral artery.    Left carotid system:  There is no plaque.  The peak  systolic velocity in the left CCA is 110 cm/sec.  The peak systolic velocity within the internal carotid artery is 76 cm/sec.  The ICA/CCA ratio is calculated as 0.7.  The external carotid artery is patent with antegrade flow. Antegrade flow is present in the vertebral artery.    IMPRESSION:    Normal bilateral carotid ultrasound.    Electronically signed by:  Brooke Jade MD  8/4/2023 8:42 PM CDT Workstation: [id number]                                     X-Ray Chest AP Portable (Final result)  Result time 08/04/23 19:09:18      Final result by Rj Kellogg MD (08/04/23 19:09:18)                   Narrative:    History: Stroke.    FINDINGS: AP view of the chest is compared to previous study dated July 29, 2023. No focal infiltrate or pleural effusion is seen. Cardiac silhouette is enlarged. Bony thorax appears intact.    IMPRESSION:  1. No acute cardiopulmonary change seen.    Electronically signed by:  Rj Kellogg MD  8/4/2023 7:09 PM CDT Workstation: [id number]                                     CTA Head and Neck (xpd) (Final result)  Result time 08/04/23 18:55:02      Final result by Rj Kellogg MD (08/04/23 18:55:02)                   Narrative:    CMS MANDATED QUALITY DATA - CT RADIATION - 436    All CT scans at this facility use dose modulation, iterative-reconstruction, and/or weight-based dosing when appropriate to reduce radiation dose to as low as reasonably achievable.    CMS MANDATED QUALITY RENA-[id number]    NASCET CRITERIA WAS UTILIZED FOR ESTIMATION OF STENOSIS SEVERITY WITH THE NARROWEST SEGMENT BEING COMPARED TO THE DISTAL LUMINAL DIAMETER.        HISTORY:Stroke/TIA. Determined embolic source.    TECHNIQUE: Serial axial images were obtained from aortic arch through the vertex with 100 cc of Omnipaque 350 intravenous contrast utilizing a CT angiography protocol. Coronal and sagittal MIP CT angiography reconstructions were performed. Patient received approximately 158 mGy-cm of  radiation exposure from this exam.    COMPARISON: Comparison to previous study dated July 4, 2023.    FINDINGS:Aortic arch is unremarkable. Great vessel origins appear widely patent. Ectatic patent cervical carotid arteries are visualized. No evidence of significant cervical carotid artery stenosis is seen. Minimal atherosclerotic changes are present at the left cervical carotid bifurcation. Codominant patent vertebral arteries are visualized.    Internal carotid arteries appear widely patent at the skull base. Codominant vertebral arteries are present at the skull base with a widely patent basilar artery. Bilateral anterior, middle, and posterior cerebral arteries appear patent without evidence of significant stenosis. Patent bilateral posterior communicating arteries are identified.    IMPRESSION:  1. No evidence of cervical carotid/vertebral artery occlusion or significant stenosis.  2. No evidence of large vessel intracranial arterial occlusion or significant stenosis is seen.          Electronically signed by:  Rj Kellogg MD  8/4/2023 6:55 PM CDT Workstation: [id number]                                     CT Head Without Contrast (Edited Result - FINAL)  Result time 08/04/23 19:36:33      Edited Result - FINAL by Fozia Le DO (08/04/23 19:36:33)                   Narrative:         ADDENDUM #1       This report contains critical findings that may be critical to patient care.    At 6:47 PM CST on 8/4/2023 , the report findings were confirmed with Dr. Torrez has received exam report, is aware of the finding(s), and indicated that a call was not necessary to discuss exam findings.        Electronically signed by:  Fozia Le DO  8/4/2023 7:36 PM CDT Workstation: [id number]     ORIGINAL REPORT       CT of the head: 8/4/2023 6:40 PM CDT    HISTORY: 63 years  old Female with Neuro deficit, acute, stroke suspected. Patient found unresponsive, altered mental status    COMPARISON: 7/3/2023    TECHNIQUE:  Multiple axial contiguous images were obtained through the head without intravenous contrast administered. This exam was performed according to our departmental dose-optimization program, which includes automated exposure control, adjustment of the mA and/or KV according to the patient's size and/or use of iterative reconstruction technique.    FINDINGS: The ventricles and cerebral sulci demonstrate mild prominence, consistent with cerebral atrophy. There are moderate periventricular hypodensities, suggestive of periventricular white matter changes.    There are similar hypodensities at the left frontal lobe and left basal ganglia.    The gray-white matter differentiation is within normal limits.    Both globes appear symmetric.    The mastoid air cells appear clear.    The visualized paranasal sinuses appear clear.    The calvarium is intact.    No extra-axial fluid collection is seen.    No midline shift or mass effect is apparent.    There are no findings to suggest acute intracranial hemorrhage.    IMPRESSION: 1. No acute intracranial hemorrhage is seen.  2. There is mild to moderate cerebral atrophy and periventricular white matter changes are seen.    Electronically signed by:  Fozia Le DO  8/4/2023 6:41 PM CDT Workstation: [id number]                      Final result by Fozia Le DO (08/04/23 18:41:20)                   Narrative:    CT of the head: 8/4/2023 6:40 PM CDT    HISTORY: 63 years  old Female with Neuro deficit, acute, stroke suspected. Patient found unresponsive, altered mental status    COMPARISON: 7/3/2023    TECHNIQUE: Multiple axial contiguous images were obtained through the head without intravenous contrast administered. This exam was performed according to our departmental dose-optimization program, which includes automated exposure control, adjustment of the mA and/or KV according to the patient's size and/or use of iterative reconstruction technique.    FINDINGS: The  ventricles and cerebral sulci demonstrate mild prominence, consistent with cerebral atrophy. There are moderate periventricular hypodensities, suggestive of periventricular white matter changes.    There are similar hypodensities at the left frontal lobe and left basal ganglia.    The gray-white matter differentiation is within normal limits.    Both globes appear symmetric.    The mastoid air cells appear clear.    The visualized paranasal sinuses appear clear.    The calvarium is intact.    No extra-axial fluid collection is seen.    No midline shift or mass effect is apparent.    There are no findings to suggest acute intracranial hemorrhage.    IMPRESSION: 1. No acute intracranial hemorrhage is seen.  2. There is mild to moderate cerebral atrophy and periventricular white matter changes are seen.    Electronically signed by:  Fozia Le DO  8/4/2023 6:41 PM CDT Workstation: [id number]                                            ASSESSMENT AND PLAN:          Encephalopathy  Prior stroke  Seizure like activity  COVID infection     Plan:   Admitted to the hospital with encephalopathy and unable to talk for the past few days with worsening in the last 1 day prior to presentation.  Etiology is unknown however could be seizure with postictal state versus metabolic/infectious causes versus intracranial pathology with acute CVA  CT head and CT angiogram head and neck done no significant acute abnormalities.  MRI brain ordered and pending.   Stat EEG was done showed no seizures or  Epileptiform activity.  Repeat EEG was done on 08/06 showed generalized periodic discharges with sharp waveform with seizure potential however no seizures were recorded.  2 mg Ativan was given during the study which cleared the background.  Additional loading dose of Keppra 2 g was given and maintenance dose of Keppra was increased to 1000 mg b.i.d..  Continuous long-term EEG monitoring for 23 hours showed no seizures however continues to  show intermittent generalized periodic discharges and occasional epileptiform activities.  Aspirin, Lipitor for stroke prevention.  Will hold off on Plavix as patient does microhemorrhages and dual antiplatelet therapy increases risk of intracranial hemorrhage.  Physical and occupational therapy evaluate and treat.  Neuro checks per unit protocol.  Workup/management for metabolic/infectious causes of encephalopathy per primary team.  If there is no continued improvement in mental status, will repeat EEG tomorrow  Will follow         Geoffrey Roque MD  Neurology/vascular Neurology  Date of Service: 08/08/2023  2:55 PM    Please note: This note was transcribed using voice recognition software. Because of this technology there are often uinintended grammatical, spelling, and other transcription errors. Please disregard these errors.

## 2023-08-08 NOTE — PROGRESS NOTES
"[facility]  Adult Nutrition   Consult Note (Nutrition Support Management)    SUMMARY     Recommendations  Recommendation/Intervention:   1. Recommend Glucerna 1.2 @ goal rate of 60ml/hr to provide 1728 kcals, 86g pro, 1159ml free H20. Start feeding at 10ml/hr and increase by 10ml every 6 hours as tolerated until goal rate reached. Recommend 40ml FWF every 4 hours.   2. Recommend starting sliding scale insulin.  2. RD to monitor NS, weight and labs.  Goals:   1. Pt to tolerate TF @ 60 ml/hr within the next 48 hours.  Nutrition Goal Status: progressing towards goal    Dietitian Rounds Brief  Consult for new TF recommendations. Pt admitted for AMS and was recently dx with COVID. Per SLP, dx with dysphagia and cognitive-linguistic impairment with a severe communication deficit and unsafe for po intake at this time. NG tube placed. Pt has elevated BS levels, starting TF may continue to increase levels, consider sliding scale insulin to manage BG levels. TF recommendations made, will continue to monitor.     Diet order:   Current Diet Order: NPO                 Evaluation of Received Nutrient/Fluid Intake  Energy Calories Required: not meeting needs  Protein Required: not meeting needs  Fluid Required: not meeting needs  Comments: NPO     % Intake of Estimated Energy Needs: Other NPO  % Meal Intake: NPO      Intake/Output Summary (Last 24 hours) at 8/8/2023 1605  Last data filed at 8/8/2023 1300  Gross per 24 hour   Intake 0 ml   Output 2425 ml   Net -2425 ml        Anthropometrics  Temp: 98 °F (36.7 °C)  Height Method: Stated  Height: 4' 11" (149.9 cm)  Height (inches): 59 in  Weight Method: Bed Scale  Weight: 55.7 kg (122 lb 12.7 oz)  Weight (lb): 122.8 lb  Ideal Body Weight (IBW), Female: 95 lb  % Ideal Body Weight, Female (lb): 127.63 %  BMI (Calculated): 24.8  BMI Grade: 18.5-24.9 - normal       Estimated/Assessed Needs  Weight Used For Calorie Calculations: 55.7 kg (122 lb 12.7 oz)  Energy Calorie " Requirements (kcal): 3596-6981 kcals (30-35 kcal/kg) (Normal BMI, acute care)  Energy Need Method: Kcal/kg  Protein Requirements: 67-84g (1.2-1.5g/kg)  Weight Used For Protein Calculations: 55.7 kg (122 lb 12.7 oz)     Estimated Fluid Requirement Method: RDA Method  RDA Method (mL): 1671       Reason for Assessment  Reason For Assessment: new tube feeding  Diagnosis: other (see comments) (AMS, COVID-19)  Relevant Medical History: Diabetes mellitus    Hypertension (I10)    COPD (chronic obstructive pulmonary disease) (J44.9)    Arthritis (M19.90)    Wears glasses (Z97.3)    Stroke (I63.9)    Complete tear of left rotator cuff (M75.122) 11/09/2017   H/o Cerebrovascular accident (CVA) of left pontine structure (I63.9) 12/12/2019   COVID-19 infection (U07.1) 07/02/2021   Personal history of colonic polyps (Z86.010)  Interdisciplinary Rounds: did not attend    Nutrition/Diet History  Spiritual, Cultural Beliefs, Adventist Practices, Values that Affect Care: no    Nutrition Risk Screen  Nutrition Risk Screen: other (see comments) (AMS; unfit for oral intake per SLP)     MST Score: 0  Have you recently lost weight without trying?: No  Weight loss score: 0  Have you been eating poorly because of a decreased appetite?: No  Appetite score: 0       Weight History:  Wt Readings from Last 10 Encounters:   08/08/23 55.7 kg (122 lb 12.7 oz)   07/03/23 59 kg (130 lb)   06/27/23 50.4 kg (111 lb)   06/16/23 59.9 kg (132 lb 0.9 oz)   05/18/23 64.2 kg (141 lb 8.6 oz)   05/04/23 62.2 kg (137 lb 2 oz)   04/27/23 60.7 kg (133 lb 12.8 oz)   04/28/23 60.3 kg (133 lb)   04/25/23 62.6 kg (138 lb)   04/18/23 62.6 kg (138 lb)        Lab/Procedures/Meds: Pertinent Labs/Meds Reviewed    Medications:Pertinent Medications Reviewed  Scheduled Meds:   amLODIPine  10 mg Oral Daily    aspirin  81 mg Oral Daily    atorvastatin  80 mg Oral QHS    cefTRIAXone (ROCEPHIN) IVPB  1 g Intravenous Q24H    dantrolene  50 mg Oral TID    dexAMETHasone  4 mg  Intravenous Q12H    enoxparin  40 mg Subcutaneous Q24H (prophylaxis, 1700)    EScitalopram oxalate  10 mg Oral QHS    insulin detemir U-100 (Levemir)  20 Units Subcutaneous QHS    levETIRAcetam (Keppra) IV (PEDS and ADULTS)  1,000 mg Intravenous Q12H    metoprolol tartrate  100 mg Per G Tube BID    modafiniL  200 mg Oral Daily    mupirocin   Nasal BID    pantoprazole  40 mg Intravenous Daily     Continuous Infusions:  PRN Meds:.acetaminophen, dextrose 50%, glucagon (human recombinant), hydrALAZINE, insulin aspart U-100, melatonin, sodium chloride 0.9%    Labs: Pertinent Labs Reviewed  Clinical Chemistry:  Recent Labs   Lab 08/04/23 1831 08/05/23 0408 08/08/23 0459      < > 131*   K 5.0   < > 4.2   *   < > 107   CO2 22*   < > 17*   *   < > 290*   BUN 37*   < > 40*   CREATININE 1.6*   < > 0.8   CALCIUM 8.9   < > 8.7   PROT 7.8  --   --    ALBUMIN 3.6  --   --    BILITOT 0.3  --   --    ALKPHOS 115  --   --    AST 34  --   --    ALT 44  --   --    ANIONGAP 5*   < > 7*    < > = values in this interval not displayed.     CBC:   Recent Labs   Lab 08/08/23 0459   WBC 13.98*   RBC 3.43*   HGB 9.6*   HCT 26.8*      MCV 78*   MCH 28.0   MCHC 35.8     Lipid Panel:  Recent Labs   Lab 08/04/23 1831   CHOL 92*   HDL 38*   LDLCALC 41.6*   TRIG 62   CHOLHDL 41.3     Diabetes:  Recent Labs   Lab 08/05/23 0408   HGBA1C 6.4*     Thyroid & Parathyroid:  Recent Labs   Lab 08/04/23  1831   TSH 0.770       Monitor and Evaluation  Food and Nutrient Intake: enteral nutrition intake  Food and Nutrient Adminstration: enteral and parenteral nutrition administration  Knowledge/Beliefs/Attitudes: food and nutrition knowledge/skill  Physical Activity and Function: nutrition-related ADLs and IADLs  Anthropometric Measurements: weight, weight change, body mass index  Biochemical Data, Medical Tests and Procedures: electrolyte and renal panel, gastrointestinal profile, glucose/endocrine profile, inflammatory profile,  lipid profile  Nutrition-Focused Physical Findings: overall appearance     Nutrition Risk  Level of Risk/Frequency of Follow-up: high     Nutrition Follow-Up  RD Follow-up?: Yes      Yael Rodriguez, Student Dietitian 08/08/2023 4:05 PM

## 2023-08-08 NOTE — PT/OT/SLP PROGRESS
Speech Language Pathology Treatment    Patient Name:  Steff Johnson   MRN:  [id number]  Admitting Diagnosis: AMS (altered mental status)    Recommendations:                 General Recommendations:  Dysphagia therapy, Speech/language therapy, and Cognitive-linguistic therapy; dietitian consult as to temporary alternative source of  nutrition  Diet recommendations:  NPO, Liquid Diet Level: NPO   Aspiration Precautions:  based on visit today, nothing by mouth; cautious oral care; maintain head of bed elevation at least 30 degrees    General Precautions: Standard, airborne, aspiration, NPO, fall, droplet, other (see comments) (COVID 19)  Communication strategies:   currently not demonstrating yes/no response    Assessment:     Steff Johnson is a 63 y.o. female with an SLP diagnosis of Dysphagia and Cognitive-Linguistic Impairment.  She presents with severe communication deficits; she is not safe for oral intake at this time.   Recommend temporary alternative source of nutrition.eyes open.    Subjective     No speech during this visit.  Patient goals: not able to state goals     Pain/Comfort:  Pain Rating 1: 0/10 (no outward sign of pain)    Respiratory Status: Room air    Objective:     Has the patient been evaluated by SLP for swallowing?   Yes  Keep patient NPO? Yes   Current Respiratory Status:    room air    Pt seen for ongoing assessment after checking with nursing.  She did not demonstrate comprehension of spoken instructions in 5 trials.  She did not demonstrate voice; she did not imitate words or participate in counting activity.  Nursing reports one episode of automatic speech response this morning.     Pt demonstrated minimal lip movement in response to ice pressed to lips; she did not protrude tongue to reach ice or attempt to suck ice.  She did not open mouth for moist cool spoon.  Oral cavity was suctioned gently -- small amount of saliva removed.  No audible wet breath sounds; no drooling during this  visit; nursing has witnessed some drooling.  Suspect pt may be demonstrating some spontaneous swallows throughout day but none were noted during this visit.    Recommend continue npo with temporary alternative nutrition/hydration.  Spoke with dietitian and nursing.    Goals:   Multidisciplinary Problems       SLP Goals          Problem: SLP    Goal Priority Disciplines Outcome   SLP Goal     SLP Ongoing, Progressing   Description: 1. Pt will participate in ongoing assessment of communication and swallow.  2. Pt will tolerate a small ice chip in her mouth without adverse response/turning away (allowing placement of ice chip).  3.  Pt will produce voice with model x 20% of trials.  4.  Pt will respond to yes/no questions with 60% accuracy via any modality.                         Plan:     Patient to be seen:  6 x/week   Plan of Care expires:  08/26/23  Plan of Care reviewed with:  patient, daughter, other (see comments) (nursing)   SLP Follow-Up:  Yes       Discharge recommendations:  other (see comments) (to be determined)   Barriers to Discharge:   nutrition    Time Tracking:     SLP Treatment Date:   08/08/23  Speech Start Time:  1420  Speech Stop Time:  1440     Speech Total Time (min):  20 min    Billable Minutes: Speech Therapy Individual 12 and Treatment Swallowing Dysfunction 8    08/08/2023

## 2023-08-08 NOTE — PT/OT/SLP EVAL
Occupational Therapy   Evaluation    Name: Steff Johnson  MRN: [id number]  Admitting Diagnosis: AMS (altered mental status)  Recent Surgery: * No surgery found *      Recommendations:     Discharge Recommendations: nursing facility, skilled  Discharge Equipment Recommendations:   (TBD)  Barriers to discharge:  Decreased caregiver support    Assessment:     Steff Johnson is a 63 y.o. female with a medical diagnosis of AMS (altered mental status).  She presents with BUE hypertonicity and aphasia. Performance deficits affecting function: weakness, impaired endurance, impaired self care skills, impaired sensation, impaired functional mobility, gait instability, impaired balance, impaired cognition, abnormal tone.      Rehab Prognosis: Fair; patient would benefit from acute skilled OT services to address these deficits and reach maximum level of function.       Plan:     Patient to be seen 3 x/week to address the above listed problems via self-care/home management, therapeutic activities, therapeutic exercises  Plan of Care Expires: 09/08/23  Plan of Care Reviewed with: patient    Subjective     Chief Complaint: BUE hypertonicity and aphasia  Patient/Family Comments/goals: improved functional mobility and ADLs.    Occupational Profile:  Living Environment: lives with spouse and brother. Was receiving therapy at an inpatient rehab facility PTA.  Previous level of function: Wheelchair level mobility and received assistance with all ADLs.  Roles and Routines: limited homemaker  Equipment Used at Home: bedside commode, cane, straight, rollator, shower chair, walker, rolling, wheelchair  Assistance upon Discharge: Spouse and brother    Pain/Comfort:  Pain Rating 1: 0/10  Pain Rating Post-Intervention 1: 0/10    Patients cultural, spiritual, Anabaptist conflicts given the current situation: no    Objective:     Communicated with: nurse prior to session.  Patient found HOB elevated with telemetry, peripheral IV upon OT entry  to room.    General Precautions: Standard, fall, airborne, contact, droplet, aphasia  Orthopedic Precautions: N/A  Braces: N/A  Respiratory Status: Room air    Occupational Performance:    Activities of Daily Living:  Grooming: total assistance to wash face bed level with HOB elevated 45 degrees.    Cognitive/Visual Perceptual:  Cognitive/Psychosocial Skills:     -       Oriented to: Person   -       Follows Commands/attention:Follows one-step commands  -       Communication: expressive aphasia and able to make noises, intermittently, but not intelligible  -       Memory: Impaired STM and Poor immediate recall  -       Safety awareness/insight to disability: impaired   -       Mood/Affect/Coping skills/emotional control: Flat affect  Visual/Perceptual:      -Impaired  tracking, R/L discrimination, visual field, motor planning praxis, and able to generalize left and midline      Physical Exam:  Upper Extremity Range of Motion:     -       Right Upper Extremity: unable to fully extend elbow, flex/extend wrist and shoulder due to hypertonicity  -       Left Upper Extremity: unable to fully extend elbow, flex/extend wrist and shoulder due to hypertonicity  Upper Extremity Strength: unable to assess secondary to patient confusion and hypertonicity   Strength:    -       Right Upper Extremity:  Poor  -       Left Upper Extremity:  Poor  Fine Motor Coordination:    -       Impaired   Right and left due to hypertonicity    AMPAC 6 Click ADL:  AMPAC Total Score: 6    Treatment & Education:  Patient educated on the purpose of Occupational Therapy and the importance of getting OOB.    Patient left HOB elevated with all lines intact, call button in reach, and bed alarm on    GOALS:   Multidisciplinary Problems       Occupational Therapy Goals          Problem: Occupational Therapy    Goal Priority Disciplines Outcome Interventions   Occupational Therapy Goal     OT, PT/OT     Description: Goals to be met by: 9/8/2023      Patient will increase functional independence with ADLs by performing:    Grooming while seated with Minimal Assistance.  Sitting at edge of bed x10 minutes with Contact Guard Assistance.  Rolling to Bilateral with Minimal Assistance.   Supine to sit with Minimal Assistance.  Upper extremity exercise program x10 reps per handout, with assistance as needed.  Attend to ADL task for 5 minutes with minimal verbal/tactile cues.  Localize left/right and across midline during therapy session with minimal verbal/tactile cues.                         History:     Past Medical History:   Diagnosis Date    Arthritis     Complete tear of left rotator cuff 11/09/2017    COPD (chronic obstructive pulmonary disease)     COVID-19 infection 07/02/2021    Diabetes mellitus     H/o Cerebrovascular accident (CVA) of left pontine structure 12/12/2019    Hypertension     Personal history of colonic polyps     Stroke     Wears glasses          Past Surgical History:   Procedure Laterality Date    COLONOSCOPY N/A 4/11/2017    Procedure: COLONOSCOPY;  Surgeon: Jared Antonio MD;  Location: [facility] ENDO;  Service: Endoscopy;  Laterality: N/A;    HYSTERECTOMY      OOPHORECTOMY      SHOULDER SURGERY      R    TRANSESOPHAGEAL ECHOCARDIOGRAM WITH POSSIBLE CARDIOVERSION (GT W/ POSS CARDIOVERSION) N/A 5/4/2023    Procedure: Transesophageal echo (GT) intra-procedure log documentation;  Surgeon: Blade Phillip MD;  Location: [facility] CATH/EP LAB;  Service: Cardiology;  Laterality: N/A;       Time Tracking:     OT Date of Treatment: 08/08/23  OT Start Time: 1010  OT Stop Time: 1029  OT Total Time (min): 19 min    Billable Minutes:Evaluation 4  Self Care/Home Management 15    8/8/2023

## 2023-08-08 NOTE — PROGRESS NOTES
[facility] Medicine  Progress Note    Patient Name: Steff Johnson  MRN: [id number]  Patient Class: IP- Inpatient   Admission Date: 8/4/2023  Length of Stay: 4 days  Attending Physician: Nish Abarca MD  Primary Care Provider: Cristina Ren MD        Subjective:     Principal Problem:AMS (altered mental status)        HPI:  Steff Johnson is a 63 y.o. Black or  female   With PMH of stroke with expressive aphasia, right upper extremity paralysis, HTN, DM, COPD.    who presents with worsening AMS.      Patient was diagnosed with COVID a week ago. Patient currently not answering any questions, does not follow commands. Per discussion with ED provider and chart review.      Per her daughter, she  her upfrom physical rehab by her daughter today at approximately 1:00 p.m. and notice that she was not speaking as much as she normally does at home.  At baseline, per her daughter, the patient is able to speak in short phrases.  She does not complete sentences.  Patient arrives in the ED was brought back to the [facility] where she was noted to have a fixed left gaze and not be as interactive as she normally is.       In the ED, patient was hemodynamically stable, CT head and CTA head and neck was negative. Patient was loaded with Keppra. Neuro was consulted from the ED. Recommended admission for EEG and MRI brain.          Overview/Hospital Course:  08/05  Assumed care.Chart reviewed. Consultant's attendance/procedure noted and appreciated. SLP attendance noted/appreciated.  Labs reviewed and noted fully below: leukocytosis improving with stable normocytic anemia; normal cations with improving stage 3a renal dysfunction. Telemetry reviewed: sinus.  (boyfriend X 20 years) at bedside. Patient remains encephalopathic. Receiving levetiracetam, no further seizure like activity noted.  Plan: Continue current regimen/course; Neurology consultation pending; will start low  "dose  dexamethasone, and Clinamix; AM labs for review.    08/06  Consultant's attendance noted and appreciated. Labs reviewed and noted fully below: no leukocytosis with mild microcytic anemia; normal electrolytes with stage 3a renal dysfunction (normal TSH noted). Telemetry reviewed: sinus. Patient is awake with eyes open. However she does not respond. RN states she tracked with eyes to left for Neuro, and took a "Teaspoon of water" for SLP but no purposeful response since then. MR Brain ordered, yet pending. Receiving EEG currently (started just after exam).  Plan: Continue current course/regimen--adding PPI because she is receiving dexamethasone; for MRI; AM labs for review    08/07  Discussed patient with Neurology: not true status, but epileptiform seen on EEG. Reloaded with levetiracetam, and given lorazepam yesterday. Labs reviewed and noted below: mild leukocytosis with stable mild microcytic anemia; trivial hyponatremia with normal renal function and moderate prerenal azotemia. MRI Brain ordered, yet pending. Telemetry reviewed: sinus. Patient remains encephalopathic and non-communicative. Plan: Continue current course; for MRI this evening; AM labs for review; will need long term placement.    08/08  Discussed with Neurology: for MRI. Overall remains the same neurologically. Discussed with RN: patient unable to swallow: will need NGT (placed), tube feeds and Toprol XL change to the tartrate formulation to be able to crush the medication. Plan: NGT, continue current course except for changing metoprolol to be able to crush; for MRI; will empirically give dose ceftriaxone; if MRI non-contributory as to etiology will discuss LP with Neuro      Interval History: persisting encephalopathy    Review of Systems   Unable to perform ROS: Patient unresponsive     Objective:     Vital Signs (Most Recent):  Temp: 98 °F (36.7 °C) (08/08/23 1120)  Pulse: (!) 56 (08/08/23 1400)  Resp: 13 (08/08/23 1400)  BP: 134/63 " (08/08/23 1400)  SpO2: 98 % (08/08/23 1400) Vital Signs (24h Range):  Temp:  [97.3 °F (36.3 °C)-98.9 °F (37.2 °C)] 98 °F (36.7 °C)  Pulse:  [51-64] 56  Resp:  [10-28] 13  SpO2:  [94 %-99 %] 98 %  BP: (134-212)/() 134/63     Weight: 55.7 kg (122 lb 12.7 oz)  Body mass index is 24.8 kg/m².    Intake/Output Summary (Last 24 hours) at 8/8/2023 1535  Last data filed at 8/8/2023 1300  Gross per 24 hour   Intake 0 ml   Output 2425 ml   Net -2425 ml         Physical Exam  Vitals and nursing note reviewed.   Constitutional:       Appearance: She is well-developed.   HENT:      Head: Normocephalic and atraumatic.      Right Ear: External ear normal.      Left Ear: External ear normal.      Nose: Nose normal.   Eyes:      Conjunctiva/sclera: Conjunctivae normal.      Pupils: Pupils are equal, round, and reactive to light.   Cardiovascular:      Rate and Rhythm: Normal rate and regular rhythm.      Heart sounds: Normal heart sounds.   Pulmonary:      Effort: Pulmonary effort is normal.      Breath sounds: Normal breath sounds.      Comments: Decreased entry bases without adventitious sounds  Abdominal:      General: Bowel sounds are normal.      Palpations: Abdomen is soft.   Musculoskeletal:         General: Normal range of motion.      Cervical back: Normal range of motion and neck supple.   Skin:     General: Skin is warm and dry.      Capillary Refill: Capillary refill takes less than 2 seconds.   Neurological:      Mental Status: She is alert and oriented to person, place, and time.      Comments: CN 2-12 grossly intact-questionable right facial  Unable to fully assess motor  Did fixate briefly, but did not track/follow. No verbal response to any question at this point in time   Psychiatric:         Behavior: Behavior normal.         Thought Content: Thought content normal.         Judgment: Judgment normal.             Significant Labs: All pertinent labs within the past 24 hours have been reviewed.  BMP:   Recent  Labs   Lab 08/08/23 0459   *   *   K 4.2      CO2 17*   BUN 40*   CREATININE 0.8   CALCIUM 8.7     CBC:   Recent Labs   Lab 08/07/23 0448 08/08/23 0459   WBC 16.80* 13.98*   HGB 9.6* 9.6*   HCT 26.6* 26.8*    369       Significant Imaging: I have reviewed all pertinent imaging results/findings within the past 24 hours.      Assessment/Plan:      No notes have been filed under this hospital service.  Service: Hospital Medicine    VTE Risk Mitigation (From admission, onward)         Ordered     enoxaparin injection 40 mg  Every 24 hours         08/05/23 1838     IP VTE HIGH RISK PATIENT  Once         08/04/23 2233     Place sequential compression device  Until discontinued         08/04/23 2233                Discharge Planning   ALEX: 8/11/2023     Code Status: Full Code   Is the patient medically ready for discharge?:     Reason for patient still in hospital (select all that apply): Patient trending condition, Laboratory test and Treatment  Discharge Plan A: Skilled Nursing Facility   Discharge Delays: None known at this time              Nish Abarca MD  Department of Hospital Medicine   [facility]

## 2023-08-08 NOTE — PLAN OF CARE
MANNY spoke with Pt's daughter, Leonie, via phone to discuss skilled nursing vs. home health. Pt's daughter would like to talk to her family first and will call back with their decision. Pt's daughter stated she does not want Pt to return to [facility] acute hospital. MANNY sent SNF list to daughter's email per her request. MANNY/MARYCARMEN will continue to follow.      08/08/23 2278   Post-Acute Status   Post-Acute Authorization Placement;Home Health   Discharge Delays None known at this time   Discharge Plan   Discharge Plan A Skilled Nursing Facility   Discharge Plan B Home Health

## 2023-08-08 NOTE — CARE UPDATE
08/07/23 1945   Patient Assessment/Suction   Level of Consciousness (AVPU) alert   Respiratory Effort Normal   PRE-TX-O2   Device (Oxygen Therapy) room air   SpO2 99 %   Pulse Oximetry Type Continuous   $ Pulse Oximetry - Multiple Charge Pulse Oximetry - Multiple   Pulse (!) 57   Resp (!) 28   Education   $ Education 15 min   Respiratory Evaluation   $ Care Plan Tech Time 15 min

## 2023-08-08 NOTE — CARE UPDATE
08/08/23 0747   Patient Assessment/Suction   Level of Consciousness (AVPU) alert   Respiratory Effort Unlabored   Expansion/Accessory Muscles/Retractions no use of accessory muscles   All Lung Fields Breath Sounds clear;diminished   Rhythm/Pattern, Respiratory unlabored   Cough Frequency no cough   PRE-TX-O2   Device (Oxygen Therapy) room air   SpO2 99 %   Pulse Oximetry Type Continuous   $ Pulse Oximetry - Single Charge Pulse Oximetry - Single   Pulse 60   Resp 12   Education   $ Education Other (see comment);15 min  (sats)

## 2023-08-08 NOTE — SUBJECTIVE & OBJECTIVE
Interval History: persisting encephalopathy    Review of Systems   Unable to perform ROS: Patient unresponsive     Objective:     Vital Signs (Most Recent):  Temp: 98 °F (36.7 °C) (08/08/23 1120)  Pulse: (!) 56 (08/08/23 1400)  Resp: 13 (08/08/23 1400)  BP: 134/63 (08/08/23 1400)  SpO2: 98 % (08/08/23 1400) Vital Signs (24h Range):  Temp:  [97.3 °F (36.3 °C)-98.9 °F (37.2 °C)] 98 °F (36.7 °C)  Pulse:  [51-64] 56  Resp:  [10-28] 13  SpO2:  [94 %-99 %] 98 %  BP: (134-212)/() 134/63     Weight: 55.7 kg (122 lb 12.7 oz)  Body mass index is 24.8 kg/m².    Intake/Output Summary (Last 24 hours) at 8/8/2023 1535  Last data filed at 8/8/2023 1300  Gross per 24 hour   Intake 0 ml   Output 2425 ml   Net -2425 ml         Physical Exam  Vitals and nursing note reviewed.   Constitutional:       Appearance: She is well-developed.   HENT:      Head: Normocephalic and atraumatic.      Right Ear: External ear normal.      Left Ear: External ear normal.      Nose: Nose normal.   Eyes:      Conjunctiva/sclera: Conjunctivae normal.      Pupils: Pupils are equal, round, and reactive to light.   Cardiovascular:      Rate and Rhythm: Normal rate and regular rhythm.      Heart sounds: Normal heart sounds.   Pulmonary:      Effort: Pulmonary effort is normal.      Breath sounds: Normal breath sounds.      Comments: Decreased entry bases without adventitious sounds  Abdominal:      General: Bowel sounds are normal.      Palpations: Abdomen is soft.   Musculoskeletal:         General: Normal range of motion.      Cervical back: Normal range of motion and neck supple.   Skin:     General: Skin is warm and dry.      Capillary Refill: Capillary refill takes less than 2 seconds.   Neurological:      Mental Status: She is alert and oriented to person, place, and time.      Comments: CN 2-12 grossly intact-questionable right facial  Unable to fully assess motor  Did fixate briefly, but did not track/follow. No verbal response to any question  at this point in time   Psychiatric:         Behavior: Behavior normal.         Thought Content: Thought content normal.         Judgment: Judgment normal.             Significant Labs: All pertinent labs within the past 24 hours have been reviewed.  BMP:   Recent Labs   Lab 08/08/23 0459   *   *   K 4.2      CO2 17*   BUN 40*   CREATININE 0.8   CALCIUM 8.7     CBC:   Recent Labs   Lab 08/07/23 0448 08/08/23 0459   WBC 16.80* 13.98*   HGB 9.6* 9.6*   HCT 26.6* 26.8*    369       Significant Imaging: I have reviewed all pertinent imaging results/findings within the past 24 hours.

## 2023-08-08 NOTE — PT/OT/SLP PROGRESS
Physical Therapy Treatment    Patient Name:  Steff Johnson   MRN:  [id number]    Recommendations:     Discharge Recommendations: nursing facility, skilled  Discharge Equipment Recommendations:  (TBD)  Barriers to discharge: Decreased caregiver support    Assessment:     Steff Johnson is a 63 y.o. female admitted with a medical diagnosis of AMS (altered mental status).  She presents with the following impairments/functional limitations: weakness, impaired endurance, impaired self care skills, impaired functional mobility, gait instability, impaired balance, impaired cognition, decreased safety awareness, pain, decreased upper extremity function, decreased lower extremity function, impaired coordination, decreased ROM, abnormal tone, impaired cardiopulmonary response to activity.    Pt found HOB elevated with head turned toward R side. Pt alert and eyes attended to PT and pt attempted to follow commands when able to move(head and attempted movement with hands). Pt did exhibit increased tone in B LEs  during attempts at BM and to transition to sitting EOB. Pt required maximal A of 2 persons to attempt to sit EOB, but had difficulty with hip and knee flexion. PT is unclear of pt's prior level of function, but at the current time would benefit from PT in the acute session to increase ROM/strength and improve attention/cognitive function for follow commands during functional activities.     Rehab Prognosis: Fair; patient would benefit from acute skilled PT services to address these deficits and reach maximum level of function.    Recent Surgery: * No surgery found *      Plan:     During this hospitalization, patient to be seen daily to address the identified rehab impairments via therapeutic activities, therapeutic exercises, neuromuscular re-education and progress toward the following goals:    Plan of Care Expires:  09/06/23    Subjective     Chief Complaint: Pt grimaced in pain with movement of R UE  Patient/Family  Comments/goals: SNF  Pain/Comfort:  Pain Rating 1:  (Pt grimaced in pain with movement of R UE)  Location - Side 1: Right  Location 1: arm  Pain Addressed 1: Reposition, Distraction, Cessation of Activity      Objective:     Communicated with RADHA Jaramillo prior to session.  Patient found HOB elevated with bed alarm, blood pressure cuff, del rosario catheter, NG tube, peripheral IV, pulse ox (continuous), telemetry upon PT entry to room.     General Precautions: Standard, fall, NPO  Orthopedic Precautions: N/A  Braces: N/A  Respiratory Status: Room air     Functional Mobility:  Bed Mobility:     Supine to Sit: maximal assistance and of 2 persons  Sit to Supine: maximal assistance and of 2 persons      AM-PAC 6 CLICK MOBILITY  Turning over in bed (including adjusting bedclothes, sheets and blankets)?: 2  Sitting down on and standing up from a chair with arms (e.g., wheelchair, bedside commode, etc.): 1  Moving from lying on back to sitting on the side of the bed?: 2  Moving to and from a bed to a chair (including a wheelchair)?: 2  Need to walk in hospital room?: 1  Climbing 3-5 steps with a railing?: 1  Basic Mobility Total Score: 9       Treatment & Education:  Pt was educated on the following: call light use, importance of OOB activity and functional mobility to negate the negative effects of prolonged bed rest during this hospitalization, safe transfers/ambulation and discharge planning recommendations/options.      Patient left HOB elevated with all lines intact, call button in reach, bed alarm on, and RN notified..    GOALS:   Multidisciplinary Problems       Physical Therapy Goals          Problem: Physical Therapy    Goal Priority Disciplines Outcome Goal Variances Interventions   Physical Therapy Goal     PT, PT/OT      Description: 1. Supine to sit with min Assistance  2. Sit to stand transfer with min Assistance  3.. Bed to chair transfer with assist of 1 using Rolling Walker as needed  4.  Pt will perform bilat  COLLETTE therex for HEP x5 each w/Vcs.                            Time Tracking:     PT Received On: 08/08/23  PT Start Time: 1101     PT Stop Time: 1117  PT Total Time (min): 16 min     Billable Minutes: Neuromuscular Re-education 16    Treatment Type: Treatment  PT/PTA: PT           08/08/2023

## 2023-08-09 LAB
GLUCOSE SERPL-MCNC: 211 MG/DL (ref 70–110)
GLUCOSE SERPL-MCNC: 211 MG/DL (ref 70–110)
GLUCOSE SERPL-MCNC: 231 MG/DL (ref 70–110)
GLUCOSE SERPL-MCNC: 252 MG/DL (ref 70–110)

## 2023-08-09 PROCEDURE — 94761 N-INVAS EAR/PLS OXIMETRY MLT: CPT

## 2023-08-09 PROCEDURE — 97530 THERAPEUTIC ACTIVITIES: CPT | Mod: CQ

## 2023-08-09 PROCEDURE — 21000000 HC CCU ICU ROOM CHARGE

## 2023-08-09 PROCEDURE — 63600175 PHARM REV CODE 636 W HCPCS: Performed by: INTERNAL MEDICINE

## 2023-08-09 PROCEDURE — C9113 INJ PANTOPRAZOLE SODIUM, VIA: HCPCS | Performed by: INTERNAL MEDICINE

## 2023-08-09 PROCEDURE — 97110 THERAPEUTIC EXERCISES: CPT

## 2023-08-09 PROCEDURE — 25000003 PHARM REV CODE 250: Performed by: INTERNAL MEDICINE

## 2023-08-09 PROCEDURE — 95819 EEG AWAKE AND ASLEEP: CPT

## 2023-08-09 PROCEDURE — 92526 ORAL FUNCTION THERAPY: CPT

## 2023-08-09 PROCEDURE — 97530 THERAPEUTIC ACTIVITIES: CPT

## 2023-08-09 PROCEDURE — 92507 TX SP LANG VOICE COMM INDIV: CPT

## 2023-08-09 RX ADMIN — HYDRALAZINE HYDROCHLORIDE 10 MG: 20 INJECTION, SOLUTION INTRAMUSCULAR; INTRAVENOUS at 08:08

## 2023-08-09 RX ADMIN — INSULIN ASPART 2 UNITS: 100 INJECTION, SOLUTION INTRAVENOUS; SUBCUTANEOUS at 05:08

## 2023-08-09 RX ADMIN — MODAFINIL 200 MG: 100 TABLET ORAL at 09:08

## 2023-08-09 RX ADMIN — HYDRALAZINE HYDROCHLORIDE 10 MG: 20 INJECTION, SOLUTION INTRAMUSCULAR; INTRAVENOUS at 12:08

## 2023-08-09 RX ADMIN — METOPROLOL TARTRATE 100 MG: 25 TABLET, FILM COATED ORAL at 11:08

## 2023-08-09 RX ADMIN — DEXAMETHASONE SODIUM PHOSPHATE 4 MG: 4 INJECTION, SOLUTION INTRA-ARTICULAR; INTRALESIONAL; INTRAMUSCULAR; INTRAVENOUS; SOFT TISSUE at 08:08

## 2023-08-09 RX ADMIN — CEFTRIAXONE SODIUM 1 G: 1 INJECTION, POWDER, FOR SOLUTION INTRAMUSCULAR; INTRAVENOUS at 05:08

## 2023-08-09 RX ADMIN — LEVETIRACETAM 1000 MG: 10 INJECTION INTRAVENOUS at 08:08

## 2023-08-09 RX ADMIN — AMLODIPINE BESYLATE 10 MG: 5 TABLET ORAL at 09:08

## 2023-08-09 RX ADMIN — DANTROLENE SODIUM 50 MG: 25 CAPSULE ORAL at 08:08

## 2023-08-09 RX ADMIN — ASPIRIN 81 MG CHEWABLE TABLET 81 MG: 81 TABLET CHEWABLE at 09:08

## 2023-08-09 RX ADMIN — ESCITALOPRAM OXALATE 10 MG: 10 TABLET ORAL at 08:08

## 2023-08-09 RX ADMIN — MUPIROCIN 1 G: 20 OINTMENT TOPICAL at 09:08

## 2023-08-09 RX ADMIN — PANTOPRAZOLE SODIUM 40 MG: 40 INJECTION, POWDER, FOR SOLUTION INTRAVENOUS at 09:08

## 2023-08-09 RX ADMIN — INSULIN DETEMIR 20 UNITS: 100 INJECTION, SOLUTION SUBCUTANEOUS at 08:08

## 2023-08-09 RX ADMIN — DEXAMETHASONE SODIUM PHOSPHATE 4 MG: 4 INJECTION, SOLUTION INTRA-ARTICULAR; INTRALESIONAL; INTRAMUSCULAR; INTRAVENOUS; SOFT TISSUE at 09:08

## 2023-08-09 RX ADMIN — ENOXAPARIN SODIUM 40 MG: 40 INJECTION SUBCUTANEOUS at 05:08

## 2023-08-09 RX ADMIN — ATORVASTATIN CALCIUM 80 MG: 40 TABLET, FILM COATED ORAL at 08:08

## 2023-08-09 RX ADMIN — LEVETIRACETAM 1000 MG: 10 INJECTION INTRAVENOUS at 09:08

## 2023-08-09 RX ADMIN — INSULIN ASPART 2 UNITS: 100 INJECTION, SOLUTION INTRAVENOUS; SUBCUTANEOUS at 11:08

## 2023-08-09 NOTE — SUBJECTIVE & OBJECTIVE
Interval History: not worsening    Review of Systems   Unable to perform ROS: Patient nonverbal     Objective:     Vital Signs (Most Recent):  Temp: 97.6 °F (36.4 °C) (08/09/23 0701)  Pulse: 61 (08/09/23 1600)  Resp: 13 (08/09/23 1600)  BP: (!) 174/81 (08/09/23 1600)  SpO2: 97 % (08/09/23 1600) Vital Signs (24h Range):  Temp:  [97.6 °F (36.4 °C)-99.5 °F (37.5 °C)] 97.6 °F (36.4 °C)  Pulse:  [53-65] 61  Resp:  [11-23] 13  SpO2:  [95 %-99 %] 97 %  BP: (133-187)/() 174/81     Weight: 58 kg (127 lb 13.9 oz)  Body mass index is 25.83 kg/m².    Intake/Output Summary (Last 24 hours) at 8/9/2023 1823  Last data filed at 8/9/2023 1731  Gross per 24 hour   Intake 610 ml   Output 1850 ml   Net -1240 ml         Physical Exam  Vitals and nursing note reviewed.   Constitutional:       Appearance: She is well-developed.   HENT:      Head: Normocephalic and atraumatic.      Right Ear: External ear normal.      Left Ear: External ear normal.      Nose: Nose normal.   Eyes:      Conjunctiva/sclera: Conjunctivae normal.      Pupils: Pupils are equal, round, and reactive to light.   Cardiovascular:      Rate and Rhythm: Normal rate and regular rhythm.      Heart sounds: Normal heart sounds.   Pulmonary:      Effort: Pulmonary effort is normal.      Breath sounds: Normal breath sounds.      Comments: Decreased entry without adventitious sounds  Abdominal:      General: Bowel sounds are normal.      Palpations: Abdomen is soft.      Comments: NGT feeds   Genitourinary:     Comments: Mathur to gravity  Musculoskeletal:      Cervical back: Normal range of motion and neck supple.   Skin:     General: Skin is warm and dry.      Capillary Refill: Capillary refill takes less than 2 seconds.   Neurological:      Mental Status: She is alert.      Comments: Eyes open appears to be intermittently aware of surroundings    Psychiatric:      Comments: Unable to asses             Significant Labs: All pertinent labs within the past 24 hours have  been reviewed.  BMP:   Recent Labs   Lab 08/08/23 0459   *   *   K 4.2      CO2 17*   BUN 40*   CREATININE 0.8   CALCIUM 8.7     CBC:   Recent Labs   Lab 08/08/23 0459   WBC 13.98*   HGB 9.6*   HCT 26.8*          Significant Imaging: I have reviewed all pertinent imaging results/findings within the past 24 hours.

## 2023-08-09 NOTE — PLAN OF CARE
SW called in [facility] and faxed [facility] to the office of aging. SW awaiting 142 and will continue to follow.

## 2023-08-09 NOTE — PT/OT/SLP PROGRESS
Occupational Therapy   Treatment    Name: Steff Johnson  MRN: [id number]  Admitting Diagnosis:  AMS (altered mental status)       Recommendations:     Discharge Recommendations: nursing facility, skilled  Discharge Equipment Recommendations:   (TBD)  Barriers to discharge:  Decreased caregiver support    Assessment:     Steff Johnson is a 63 y.o. female with a medical diagnosis of AMS (altered mental status).  She presents with Left visual gaze and BUE hypertonicity. Patient participated in bed mobility, unsupported sitting EOB and BUE/BLE therex bed level. Performance deficits affecting function are weakness, impaired endurance, impaired sensation, impaired self care skills, impaired functional mobility, gait instability, impaired balance, impaired cognition, decreased upper extremity function, decreased lower extremity function, decreased safety awareness, impaired cardiopulmonary response to activity.     Rehab Prognosis:  Fair; patient would benefit from acute skilled OT services to address these deficits and reach maximum level of function.       Plan:     Patient to be seen 3 x/week to address the above listed problems via self-care/home management, therapeutic activities, therapeutic exercises  Plan of Care Expires: 09/08/23  Plan of Care Reviewed with: patient, son    Subjective     Chief Complaint: Aphasia, left side eye gaze and BUE hypertonicity  Patient/Family Comments/goals: improved cognition, functional mobility and ADLs.  Pain/Comfort:  Pain Rating 1: 0/10  Pain Rating Post-Intervention 1: 0/10    Objective:     Communicated with: nurse prior to session.  Patient found HOB elevated with telemetry, peripheral IV, del rosario catheter, NG tube upon OT entry to room.    General Precautions: Standard, airborne, contact, droplet, aspiration, aphasia    Orthopedic Precautions:N/A  Braces: N/A  Respiratory Status: Room air     Occupational Performance:     Bed Mobility:    Patient completed Scooting/Bridging  with total assistance  Patient completed Supine to Sit with total assistance  Patient completed Sit to Supine with total assistance   Performed unsupported sitting EOB with minimal assistance with short periods of contact guard assistance.    BUE/BLE AAROM:  Performed RUE/LUE AAROM x5 reps with maximal assistance bed level  Performed RLE AAROM x5 reps with moderate assistance bed level  Performed LLE AAROM x5 reps with moderate assistance bed level.  Functional Mobility:     [facility] 6 Click ADL:      Treatment & Education:  Patient presented with hypertonicity to Right and Left upper extremities during session. Leaned to the left while sitting EOB.    Patient left HOB elevated with all lines intact, call button in reach, and son present    GOALS:   Multidisciplinary Problems       Occupational Therapy Goals          Problem: Occupational Therapy    Goal Priority Disciplines Outcome Interventions   Occupational Therapy Goal     OT, PT/OT     Description: Goals to be met by: 9/8/2023     Patient will increase functional independence with ADLs by performing:    Grooming while seated with Minimal Assistance.  Sitting at edge of bed x10 minutes with Contact Guard Assistance.  Rolling to Bilateral with Minimal Assistance.   Supine to sit with Minimal Assistance.  Upper extremity exercise program x10 reps per handout, with assistance as needed.  Attend to ADL task for 5 minutes with minimal verbal/tactile cues.  Localize left/right and across midline during therapy session with minimal verbal/tactile cues.                         Time Tracking:     OT Date of Treatment: 08/09/23  OT Start Time: 0947  OT Stop Time: 1011  OT Total Time (min): 24 min    Billable Minutes:Therapeutic Activity 12  Therapeutic Exercise 12    OT/LIYAH: OT          8/9/2023

## 2023-08-09 NOTE — PT/OT/SLP PROGRESS
Physical Therapy Treatment    Patient Name:  Steff Johnson   MRN:  [id number]    Recommendations:     Discharge Recommendations: nursing facility, skilled  Discharge Equipment Recommendations:  (TBD)  Barriers to discharge:  assist of 2 persons for mobility, decreased activity tolerance, poor command follow    Assessment:     Steff Johnson is a 63 y.o. female admitted with a medical diagnosis of AMS (altered mental status).  She presents with the following impairments/functional limitations: weakness, impaired endurance, impaired self care skills, impaired functional mobility, gait instability, impaired balance, impaired cognition, decreased safety awareness, pain, decreased upper extremity function, decreased lower extremity function, impaired coordination, decreased ROM, abnormal tone, impaired cardiopulmonary response to activity.    Pt found with HOB elevated and head turned toward L side. Pt eyes followed therapist but pt did not respond to questions or commands. Pt required max assist x 2 for transfer to EOB. Pt required initial max assist for sitting balance EOB but progressed to min assist with time EOB. MD entered and pt responded to him asking her name and following some commands. MD exited. Pt able to follow commands for LAQ in sitting. Pt returned to supine with max assist x 2.    Rehab Prognosis: Fair; patient would benefit from acute skilled PT services to address these deficits and reach maximum level of function.    Recent Surgery: * No surgery found *      Plan:     During this hospitalization, patient to be seen daily to address the identified rehab impairments via therapeutic activities, therapeutic exercises, neuromuscular re-education and progress toward the following goals:    Plan of Care Expires:  09/06/23    Subjective     Chief Complaint: none verbalized and no visual signs of discomfort  Patient/Family Comments/goals: none verbalized  Pain/Comfort:  Pain Rating 1: 0/10 (no visible  signs)      Objective:     Communicated with RN prior to session.  Patient found HOB elevated with bed alarm, blood pressure cuff, del rosario catheter, NG tube, peripheral IV, pulse ox (continuous), telemetry upon PT entry to room.     General Precautions: Standard, fall, NPO  Orthopedic Precautions: N/A  Braces: N/A  Respiratory Status: Room air     Functional Mobility:  Bed Mobility:     Supine to Sit: maximal assistance and of 2 persons  Sit to Supine: maximal assistance and of 2 persons  Balance: sitting balance EOB maxA > Delores with brief moments of close SBA until pt begins to slowly lean towards left side      AM-PAC 6 CLICK MOBILITY          Treatment & Education:  Pt educated on importance of time OOB, importance of intermittent mobility, safe techniques for transfers/ambulation, discharge recommendations/options, and use of call light for assistance and fall prevention.      Patient left HOB elevated with all lines intact, call button in reach, and bed alarm on..    GOALS:   Multidisciplinary Problems       Physical Therapy Goals          Problem: Physical Therapy    Goal Priority Disciplines Outcome Goal Variances Interventions   Physical Therapy Goal     PT, PT/OT      Description: 1. Supine to sit with min Assistance  2. Sit to stand transfer with min Assistance  3.. Bed to chair transfer with assist of 1 using Rolling Walker as needed  4.  Pt will perform bilat LE therex for HEP x5 each w/Vcs.                            Time Tracking:     PT Received On: 08/09/23  PT Start Time: 1149     PT Stop Time: 1209  PT Total Time (min): 20 min     Billable Minutes: Therapeutic Activity 20    Treatment Type: Treatment  PT/PTA: PTA     Number of PTA visits since last PT visit: 1     08/09/2023

## 2023-08-09 NOTE — PLAN OF CARE
SW called Pts daughter, Leonie, to discuss SNF vs. LTAC. SW left message and call back number on voicemail. SW/CM will continue to follow.

## 2023-08-10 PROBLEM — G40.909 SEIZURE DISORDER: Status: ACTIVE | Noted: 2023-08-10

## 2023-08-10 PROBLEM — I63.9 ACUTE CVA (CEREBROVASCULAR ACCIDENT): Status: ACTIVE | Noted: 2023-08-10

## 2023-08-10 PROBLEM — G93.40 ACUTE ENCEPHALOPATHY: Status: ACTIVE | Noted: 2023-08-10

## 2023-08-10 LAB
ANION GAP SERPL CALC-SCNC: 11 MMOL/L (ref 8–16)
BUN SERPL-MCNC: 42 MG/DL (ref 8–23)
CALCIUM SERPL-MCNC: 8.6 MG/DL (ref 8.7–10.5)
CHLORIDE SERPL-SCNC: 105 MMOL/L (ref 95–110)
CO2 SERPL-SCNC: 18 MMOL/L (ref 23–29)
CREAT SERPL-MCNC: 1.1 MG/DL (ref 0.5–1.4)
ERYTHROCYTE [DISTWIDTH] IN BLOOD BY AUTOMATED COUNT: 14.6 % (ref 11.5–14.5)
EST. GFR  (NO RACE VARIABLE): 55.8 ML/MIN/1.73 M^2
GLUCOSE SERPL-MCNC: 232 MG/DL (ref 70–110)
GLUCOSE SERPL-MCNC: 239 MG/DL (ref 70–110)
GLUCOSE SERPL-MCNC: 259 MG/DL (ref 70–110)
GLUCOSE SERPL-MCNC: 284 MG/DL (ref 70–110)
GLUCOSE SERPL-MCNC: 295 MG/DL (ref 70–110)
GLUCOSE SERPL-MCNC: 314 MG/DL (ref 70–110)
GLUCOSE SERPL-MCNC: 343 MG/DL (ref 70–110)
HCT VFR BLD AUTO: 27.2 % (ref 37–48.5)
HGB BLD-MCNC: 9.7 G/DL (ref 12–16)
MCH RBC QN AUTO: 28.2 PG (ref 27–31)
MCHC RBC AUTO-ENTMCNC: 35.7 G/DL (ref 32–36)
MCV RBC AUTO: 79 FL (ref 82–98)
PLATELET # BLD AUTO: 419 K/UL (ref 150–450)
PMV BLD AUTO: 10.9 FL (ref 9.2–12.9)
POTASSIUM SERPL-SCNC: 4.3 MMOL/L (ref 3.5–5.1)
RBC # BLD AUTO: 3.44 M/UL (ref 4–5.4)
SODIUM SERPL-SCNC: 134 MMOL/L (ref 136–145)
WBC # BLD AUTO: 17.67 K/UL (ref 3.9–12.7)

## 2023-08-10 PROCEDURE — 63600175 PHARM REV CODE 636 W HCPCS: Performed by: INTERNAL MEDICINE

## 2023-08-10 PROCEDURE — 25000003 PHARM REV CODE 250: Performed by: INTERNAL MEDICINE

## 2023-08-10 PROCEDURE — 92526 ORAL FUNCTION THERAPY: CPT

## 2023-08-10 PROCEDURE — 85027 COMPLETE CBC AUTOMATED: CPT | Performed by: INTERNAL MEDICINE

## 2023-08-10 PROCEDURE — 36415 COLL VENOUS BLD VENIPUNCTURE: CPT | Performed by: INTERNAL MEDICINE

## 2023-08-10 PROCEDURE — 21000000 HC CCU ICU ROOM CHARGE

## 2023-08-10 PROCEDURE — C9113 INJ PANTOPRAZOLE SODIUM, VIA: HCPCS | Performed by: INTERNAL MEDICINE

## 2023-08-10 PROCEDURE — 97110 THERAPEUTIC EXERCISES: CPT

## 2023-08-10 PROCEDURE — 97530 THERAPEUTIC ACTIVITIES: CPT

## 2023-08-10 PROCEDURE — 82962 GLUCOSE BLOOD TEST: CPT

## 2023-08-10 PROCEDURE — 92507 TX SP LANG VOICE COMM INDIV: CPT

## 2023-08-10 PROCEDURE — 94761 N-INVAS EAR/PLS OXIMETRY MLT: CPT

## 2023-08-10 PROCEDURE — 80048 BASIC METABOLIC PNL TOTAL CA: CPT | Performed by: INTERNAL MEDICINE

## 2023-08-10 RX ORDER — FAMOTIDINE 20 MG/1
20 TABLET, FILM COATED ORAL DAILY
Status: DISCONTINUED | OUTPATIENT
Start: 2023-08-10 | End: 2023-08-11 | Stop reason: HOSPADM

## 2023-08-10 RX ADMIN — ATORVASTATIN CALCIUM 80 MG: 40 TABLET, FILM COATED ORAL at 09:08

## 2023-08-10 RX ADMIN — LEVETIRACETAM 1000 MG: 10 INJECTION INTRAVENOUS at 09:08

## 2023-08-10 RX ADMIN — PANTOPRAZOLE SODIUM 40 MG: 40 INJECTION, POWDER, FOR SOLUTION INTRAVENOUS at 08:08

## 2023-08-10 RX ADMIN — AMLODIPINE BESYLATE 10 MG: 5 TABLET ORAL at 08:08

## 2023-08-10 RX ADMIN — INSULIN ASPART 4 UNITS: 100 INJECTION, SOLUTION INTRAVENOUS; SUBCUTANEOUS at 05:08

## 2023-08-10 RX ADMIN — METOPROLOL TARTRATE 100 MG: 25 TABLET, FILM COATED ORAL at 09:08

## 2023-08-10 RX ADMIN — DEXAMETHASONE SODIUM PHOSPHATE 4 MG: 4 INJECTION, SOLUTION INTRA-ARTICULAR; INTRALESIONAL; INTRAMUSCULAR; INTRAVENOUS; SOFT TISSUE at 08:08

## 2023-08-10 RX ADMIN — LEVETIRACETAM 1000 MG: 10 INJECTION INTRAVENOUS at 08:08

## 2023-08-10 RX ADMIN — INSULIN ASPART 1 UNITS: 100 INJECTION, SOLUTION INTRAVENOUS; SUBCUTANEOUS at 12:08

## 2023-08-10 RX ADMIN — ASPIRIN 81 MG CHEWABLE TABLET 81 MG: 81 TABLET CHEWABLE at 08:08

## 2023-08-10 RX ADMIN — MODAFINIL 200 MG: 100 TABLET ORAL at 08:08

## 2023-08-10 RX ADMIN — INSULIN DETEMIR 20 UNITS: 100 INJECTION, SOLUTION SUBCUTANEOUS at 09:08

## 2023-08-10 RX ADMIN — HYDRALAZINE HYDROCHLORIDE 10 MG: 20 INJECTION, SOLUTION INTRAMUSCULAR; INTRAVENOUS at 11:08

## 2023-08-10 RX ADMIN — ESCITALOPRAM OXALATE 10 MG: 10 TABLET ORAL at 09:08

## 2023-08-10 RX ADMIN — INSULIN ASPART 3 UNITS: 100 INJECTION, SOLUTION INTRAVENOUS; SUBCUTANEOUS at 12:08

## 2023-08-10 RX ADMIN — HYDRALAZINE HYDROCHLORIDE 10 MG: 20 INJECTION, SOLUTION INTRAMUSCULAR; INTRAVENOUS at 03:08

## 2023-08-10 RX ADMIN — DANTROLENE SODIUM 50 MG: 25 CAPSULE ORAL at 09:08

## 2023-08-10 RX ADMIN — ENOXAPARIN SODIUM 40 MG: 40 INJECTION SUBCUTANEOUS at 04:08

## 2023-08-10 NOTE — PROGRESS NOTES
[facility]  Department of Neurology  Progress Note  Date: 08/10/2023 2:55 PM          Patient Name: Steff Johnson   MRN: [id number]   : 1960    AGE: 63 y.o.    LOS: 6 days Hospital Day: 7  Admit date: 2023  6:11 PM        HPI per EMR: Steff Johnson is a 63 y.o. female with a history of  stroke with expressive aphasia, right upper extremity paralysis, HTN, DM, COPD.    who presents with worsening AMS.      Patient was diagnosed with COVID a week ago. Patient currently not answering any questions, does not follow commands. Per discussion with ED provider and chart review.      Per her daughter, she  her upfrom physical rehab by her daughter today at approximately 1:00 p.m. and notice that she was not speaking as much as she normally does at home.  At baseline, per her daughter, the patient is able to speak in short phrases.  She does not complete sentences.  Patient arrives in the ED was brought back to the [facility] where she was noted to have a fixed left gaze and not be as interactive as she normally is.       In the ED, patient was hemodynamically stable, CT head and CTA head and neck was negative. Patient was loaded with Keppra in ER with concern for seizures.  Admitted to the hospital for further workup.     Neurology consult:  Patient was seen examined by me.  She tracks however does not answer to questions or follow commands.  Patient's  is at bedside states that he has been like this since her recent admission to the hospital and only would make few words which has also been decreased in the last few days for which he brought to the hospital.  He states that mental status has been worse in the last few days and he noticed that yesterday she was not speaking at all.     In the ER, she was noticed to have fixed left gaze and not as interactive for which a stat EEG was done showed no seizures.  CT head was done showed no acute intracranial pathology and CT angiogram head  and neck showed no large vessel occlusion/high-grade stenosis.     She was recently admitted to the hospital in July 2023 for generalized weakness, dysarthria which is worsened from baseline.  She had an MRI brain at that time showed a small right frontal acute ischemic stroke.  She was discharged home on dual antiplatelet therapy per Neurology recommendations at that time.  Her mental status at that time was awake and oriented x4 with speech being dysarthric and making only few words at a time.  Her examined now seems to be significantly different compared to her prior neurology encounter.     08/06/2023: No acute events overnight. Patient was seen and examined by me this morning. Neuro exam is unchanged. She has L gaze preference and tracks but does not follow or answer to questions    08/07/2023:  Patient was seen examined by me.  Mental status is improved today and she is awake, able to tell me her name and follow commands.  Right upper extremity has increased tone and weak compared to the left upper.    8/8:  Patient was seen and examined.  Exam is almost similar to yesterday however slightly slower to respond compared to yesterday.    8/9:  Patient was seen examined by me.  She was being evaluated by PT and the same time and she was sitting at the side of the bed.  She would tell me her name on repeated questioning however speech was dysarthric and she was able to follow some commands.    8/10:  Patient was seen and examined by me.  Patient's daughter is at bedside.  Her mental status is slightly better today, she is awake, able to tell me her name and daughter's name.  She is not oriented to place or time.  She is able to follow commands.  EEG was repeated yesterday showed no seizures.       Vitals:  Patient Vitals for the past 24 hrs:   BP Temp Temp src Pulse Resp SpO2 Weight   08/10/23 0809 137/62 -- -- 63 15 99 % --   08/10/23 0801 -- -- -- (!) 56 11 99 % --   08/10/23 0800 137/62 -- -- (!) 56 11 99 % --    08/10/23 0701 (!) 158/71 97.5 °F (36.4 °C) -- 60 19 99 % --   08/10/23 0600 (!) 171/77 -- -- 63 17 100 % --   08/10/23 0500 (!) 148/67 -- -- (!) 56 11 96 % 57 kg (125 lb 10.6 oz)   08/10/23 0400 (!) 166/69 -- -- (!) 59 13 96 % --   08/10/23 0303 (!) 176/71 -- -- -- -- -- --   08/10/23 0300 (!) 176/71 98.1 °F (36.7 °C) Axillary 63 12 96 % --   08/10/23 0200 (!) 169/74 -- -- 60 12 96 % --   08/10/23 0100 (!) 158/70 -- -- (!) 54 12 96 % --   08/10/23 0000 139/65 -- -- (!) 55 12 98 % --   08/09/23 2300 (!) 148/70 98.1 °F (36.7 °C) Axillary (!) 57 12 95 % --   08/09/23 2200 (!) 156/69 -- -- 62 13 96 % --   08/09/23 2100 (!) 164/75 -- -- 61 14 98 % --   08/09/23 2033 (!) 168/75 -- -- -- -- -- --   08/09/23 2000 (!) 189/81 -- -- 60 13 99 % --   08/09/23 1901 -- 97.9 °F (36.6 °C) Axillary 60 12 98 % --   08/09/23 1900 (!) 180/86 -- -- 60 15 96 % --   08/09/23 1600 (!) 174/81 -- -- 61 13 97 % --   08/09/23 1500 (!) 141/66 -- -- (!) 58 13 95 % --   08/09/23 1400 (!) 154/72 -- -- (!) 57 13 96 % --   08/09/23 1332 (!) 156/72 -- -- (!) 57 11 99 % --   08/09/23 1300 (!) 161/74 -- -- (!) 57 12 98 % --   08/09/23 1239 (!) 187/91 -- -- -- -- -- --   08/09/23 1200 (!) 171/88 -- -- (!) 53 16 99 % --   08/09/23 1122 (!) 177/84 -- -- 65 -- -- --   08/09/23 1100 (!) 177/84 -- -- (!) 58 11 98 % --     PHYSICAL EXAM:      GENERAL APPEARANCE: Alert, well-developed, female in no acute distress.  HEENT: Normocephalic and atraumatic. PERRL. Oropharynx unremarkable.  PULM: Normal respiratory effort. No accessory muscle use.  CV: RRR.  ABDOMEN: Soft, nontender.  EXTREMITIES: No obvious signs of vascular compromise. Pulses present. No cyanosis, clubbing or edema.  SKIN: Clear; no rashes, lesions or skin breaks in exposed areas.     NEURO:  MENTAL STATUS:  She is awake, tracks and able to tell me her name and her daughter's name.  She follows some commands.  She responds however slow. Speech is dysarthric  CRANIAL NERVES:  Pupils equal round and  reactive to light.  Extraocular movements are intact, face is with L facial droop     MOTOR:  Bulk normal. Tone increase in right upper extremity  Abnormal movements absent.  Tremor: none present.  Strength:  Antigravity in left upper extremity and left lower extremity.  Exact strength can not be assessed as patient was slow.  Increased tone in the right upper extremity was noted     REFLEXES:  DTRs 1+ throughout.  Plantar response downgoing bilaterally.  SENSATION: not tested.  COORDINATION:  Could not be tested .  STATION: not tested.  GAIT: not tested.       CURRENT SCHEDULED MEDICATIONS:   amLODIPine  10 mg Oral Daily    aspirin  81 mg Oral Daily    atorvastatin  80 mg Oral QHS    cefTRIAXone (ROCEPHIN) IVPB  1 g Intravenous Q24H    dantrolene  50 mg Oral TID    dexAMETHasone  4 mg Intravenous Q12H    enoxparin  40 mg Subcutaneous Q24H (prophylaxis, 1700)    EScitalopram oxalate  10 mg Oral QHS    insulin detemir U-100 (Levemir)  20 Units Subcutaneous QHS    levETIRAcetam (Keppra) IV (PEDS and ADULTS)  1,000 mg Intravenous Q12H    metoprolol tartrate  100 mg Per G Tube BID    modafiniL  200 mg Oral Daily    pantoprazole  40 mg Intravenous Daily     CURRENT INFUSIONS:      DATA:  Recent Labs   Lab 08/04/23  1831 08/05/23 0408 08/06/23 0410 08/07/23  0448 08/08/23  0459 08/10/23  0550    144 142 135* 131* 134*   K 5.0 4.3 4.4 4.5 4.2 4.3   * 116* 115* 114* 107 105   CO2 22* 20* 20* 18* 17* 18*   BUN 37* 31* 39* 40* 40* 42*   CREATININE 1.6* 1.2 1.1 0.9 0.8 1.1   * 106 276* 324* 290* 284*   CALCIUM 8.9 8.6* 8.6* 8.5* 8.7 8.6*   AST 34  --   --   --   --   --    ALT 44  --   --   --   --   --      Recent Labs   Lab 08/04/23  1831 08/05/23  0408 08/06/23  0410 08/07/23 0448 08/08/23  0459 08/10/23  0551   WBC 16.23* 12.72* 12.02 16.80* 13.98* 17.67*   HGB 10.7* 10.0* 9.1* 9.6* 9.6* 9.7*   HCT 30.7* 28.6* 25.5* 26.6* 26.8* 27.2*    315 359 324 369 419     No results found for:  ""PROTEINCSF", "GLUCCSF", "WBCCSF", "RBCCSF", "PMNCSF"  Hemoglobin A1C   Date Value Ref Range Status   08/05/2023 6.4 (H) 4.5 - 6.2 % Final     Comment:     According to ADA guidelines, hemoglobin A1C <7.0% represents  optimal control in non-pregnant diabetic patients.  Different  metrics may apply to specific populations.   Standards of Medical Care in Diabetes - 2016.    For the purpose of screening for the presence of diabetes:  <5.7%     Consistent with the absence of diabetes  5.7-6.4%  Consistent with increasing risk for diabetes   (prediabetes)  >or=6.5%  Consistent with diabetes    Currently no consensus exists for use of hemoglobin A1C  for diagnosis of diabetes for children.     05/03/2023 10.2 (H) 4.5 - 6.2 % Final     Comment:     According to ADA guidelines, hemoglobin A1C <7.0% represents  optimal control in non-pregnant diabetic patients.  Different  metrics may apply to specific populations.   Standards of Medical Care in Diabetes - 2016.    For the purpose of screening for the presence of diabetes:  <5.7%     Consistent with the absence of diabetes  5.7-6.4%  Consistent with increasing risk for diabetes   (prediabetes)  >or=6.5%  Consistent with diabetes    Currently no consensus exists for use of hemoglobin A1C  for diagnosis of diabetes for children.     02/22/2023 8.9 (H) 4.0 - 5.6 % Final     Comment:     ADA Screening Guidelines:  5.7-6.4%  Consistent with prediabetes  >or=6.5%  Consistent with diabetes    High levels of fetal hemoglobin interfere with the HbA1C  assay. Heterozygous hemoglobin variants (HbS, HgC, etc)do  not significantly interfere with this assay.   However, presence of multiple variants may affect accuracy.              I have personally reviewed and interpreted the pertinent imaging and lab results.  Imaging Results              US Carotid Bilateral (Final result)  Result time 08/04/23 20:42:04      Final result by Brooke Cottrell MD (08/04/23 20:42:04)                   " Narrative:    PROCEDURE:    US CAROTID DOPPLER BILATERAL  dated  8/4/2023 7:16 PM CDT    CLINICAL HISTORY:   Female 63 years of age.    TECHNIQUE:  Multiple sagittal and transverse images of the carotid arterial system of the neck were obtained.  2-D vascular structure, Doppler spectral analysis and color flow Doppler imaging was performed.  All measurements and percent stenoses described below were determined using NASCET criteria or criteria similar to NASCET, as defined by the Society of Radiologist in Ultrasound Consensus Conference Radiology 2003.    PREVIOUS STUDIES:  None Available    FINDINGS:    Right carotid system:  There is no plaque. The peak systolic velocity in the right CCA is 96 cm/sec.  The peak systolic velocity within the internal carotid artery is 81 cm/sec.  The ICA/CCA ratio is calculated as 0.8.  The external carotid artery is patent with antegrade flow. Antegrade flow is present in the vertebral artery.    Left carotid system:  There is no plaque.  The peak systolic velocity in the left CCA is 110 cm/sec.  The peak systolic velocity within the internal carotid artery is 76 cm/sec.  The ICA/CCA ratio is calculated as 0.7.  The external carotid artery is patent with antegrade flow. Antegrade flow is present in the vertebral artery.    IMPRESSION:    Normal bilateral carotid ultrasound.    Electronically signed by:  Brooke Jade MD  8/4/2023 8:42 PM CDT Workstation: [id number]                                     X-Ray Chest AP Portable (Final result)  Result time 08/04/23 19:09:18      Final result by Rj Kellogg MD (08/04/23 19:09:18)                   Narrative:    History: Stroke.    FINDINGS: AP view of the chest is compared to previous study dated July 29, 2023. No focal infiltrate or pleural effusion is seen. Cardiac silhouette is enlarged. Bony thorax appears intact.    IMPRESSION:  1. No acute cardiopulmonary change seen.    Electronically signed by:  Rj Kellogg MD   8/4/2023 7:09 PM CDT Workstation: [id number]                                     CTA Head and Neck (xpd) (Final result)  Result time 08/04/23 18:55:02      Final result by Rj Kellogg MD (08/04/23 18:55:02)                   Narrative:    CMS MANDATED QUALITY DATA - CT RADIATION - 436    All CT scans at this facility use dose modulation, iterative-reconstruction, and/or weight-based dosing when appropriate to reduce radiation dose to as low as reasonably achievable.    CMS MANDATED QUALITY [id number]    NASCET CRITERIA WAS UTILIZED FOR ESTIMATION OF STENOSIS SEVERITY WITH THE NARROWEST SEGMENT BEING COMPARED TO THE DISTAL LUMINAL DIAMETER.        HISTORY:Stroke/TIA. Determined embolic source.    TECHNIQUE: Serial axial images were obtained from aortic arch through the vertex with 100 cc of Omnipaque 350 intravenous contrast utilizing a CT angiography protocol. Coronal and sagittal MIP CT angiography reconstructions were performed. Patient received approximately 158 mGy-cm of radiation exposure from this exam.    COMPARISON: Comparison to previous study dated July 4, 2023.    FINDINGS:Aortic arch is unremarkable. Great vessel origins appear widely patent. Ectatic patent cervical carotid arteries are visualized. No evidence of significant cervical carotid artery stenosis is seen. Minimal atherosclerotic changes are present at the left cervical carotid bifurcation. Codominant patent vertebral arteries are visualized.    Internal carotid arteries appear widely patent at the skull base. Codominant vertebral arteries are present at the skull base with a widely patent basilar artery. Bilateral anterior, middle, and posterior cerebral arteries appear patent without evidence of significant stenosis. Patent bilateral posterior communicating arteries are identified.    IMPRESSION:  1. No evidence of cervical carotid/vertebral artery occlusion or significant stenosis.  2. No evidence of large vessel intracranial  arterial occlusion or significant stenosis is seen.          Electronically signed by:  Rj Kellogg MD  8/4/2023 6:55 PM CDT Workstation: [id number]                                     CT Head Without Contrast (Edited Result - FINAL)  Result time 08/04/23 19:36:33      Edited Result - FINAL by Fozia Le DO (08/04/23 19:36:33)                   Narrative:         ADDENDUM #1       This report contains critical findings that may be critical to patient care.    At 6:47 PM CST on 8/4/2023 , the report findings were confirmed with Dr. Torrez has received exam report, is aware of the finding(s), and indicated that a call was not necessary to discuss exam findings.        Electronically signed by:  Fozia Le DO  8/4/2023 7:36 PM CDT Workstation: [id number]     ORIGINAL REPORT       CT of the head: 8/4/2023 6:40 PM CDT    HISTORY: 63 years  old Female with Neuro deficit, acute, stroke suspected. Patient found unresponsive, altered mental status    COMPARISON: 7/3/2023    TECHNIQUE: Multiple axial contiguous images were obtained through the head without intravenous contrast administered. This exam was performed according to our departmental dose-optimization program, which includes automated exposure control, adjustment of the mA and/or KV according to the patient's size and/or use of iterative reconstruction technique.    FINDINGS: The ventricles and cerebral sulci demonstrate mild prominence, consistent with cerebral atrophy. There are moderate periventricular hypodensities, suggestive of periventricular white matter changes.    There are similar hypodensities at the left frontal lobe and left basal ganglia.    The gray-white matter differentiation is within normal limits.    Both globes appear symmetric.    The mastoid air cells appear clear.    The visualized paranasal sinuses appear clear.    The calvarium is intact.    No extra-axial fluid collection is seen.    No midline shift or mass effect is  apparent.    There are no findings to suggest acute intracranial hemorrhage.    IMPRESSION: 1. No acute intracranial hemorrhage is seen.  2. There is mild to moderate cerebral atrophy and periventricular white matter changes are seen.    Electronically signed by:  Fozia Le DO  8/4/2023 6:41 PM CDT Workstation: [id number]                      Final result by Fozia Le DO (08/04/23 18:41:20)                   Narrative:    CT of the head: 8/4/2023 6:40 PM CDT    HISTORY: 63 years  old Female with Neuro deficit, acute, stroke suspected. Patient found unresponsive, altered mental status    COMPARISON: 7/3/2023    TECHNIQUE: Multiple axial contiguous images were obtained through the head without intravenous contrast administered. This exam was performed according to our departmental dose-optimization program, which includes automated exposure control, adjustment of the mA and/or KV according to the patient's size and/or use of iterative reconstruction technique.    FINDINGS: The ventricles and cerebral sulci demonstrate mild prominence, consistent with cerebral atrophy. There are moderate periventricular hypodensities, suggestive of periventricular white matter changes.    There are similar hypodensities at the left frontal lobe and left basal ganglia.    The gray-white matter differentiation is within normal limits.    Both globes appear symmetric.    The mastoid air cells appear clear.    The visualized paranasal sinuses appear clear.    The calvarium is intact.    No extra-axial fluid collection is seen.    No midline shift or mass effect is apparent.    There are no findings to suggest acute intracranial hemorrhage.    IMPRESSION: 1. No acute intracranial hemorrhage is seen.  2. There is mild to moderate cerebral atrophy and periventricular white matter changes are seen.    Electronically signed by:  Fozia Le DO  8/4/2023 6:41 PM CDT Workstation: [id number]                                             ASSESSMENT AND PLAN:          Encephalopathy  Prior stroke  Seizures  COVID infection     Plan:   Admitted to the hospital with encephalopathy and unable to talk for the past few days with worsening in the last 1 day prior to presentation.  Etiology is unknown however could be seizure with postictal state versus metabolic/infectious causes  CT head and CT angiogram head and neck done no significant acute abnormalities.  MRI brain showed tiny left parietal lobe infarct.  Punctate infarct in the right regina was also noted.  The small infarcts do not explain patient's mental status.  Stat EEG was done on admission showed no seizures or  Epileptiform activity.  Repeat EEG was done on 08/06 showed generalized periodic discharges with sharp waveform with seizure potential however no seizures were recorded.  2 mg Ativan was given during the study which cleared the background.  Additional loading dose of Keppra 2 g was given and maintenance dose of Keppra was increased to 1000 mg b.i.d..  Continuous long-term EEG monitoring for 23 hours showed no seizures however continues to show intermittent generalized periodic discharges and occasional epileptiform activities.  Aspirin, Lipitor for stroke prevention.  Will hold off on Plavix as patient does microhemorrhages and dual antiplatelet therapy increases risk of intracranial hemorrhage.  Physical and occupational therapy evaluate and treat.  Neuro checks per unit protocol.  Workup/management for metabolic/infectious causes of encephalopathy per primary team.  Repeat EEG done on 08/09 showed no seizures or epileptiform activity.  Mental status improving.  Will follow         Geoffrey Roque MD  Neurology/vascular Neurology  Date of Service: 08/10/2023  2:55 PM    Please note: This note was transcribed using voice recognition software. Because of this technology there are often uinintended grammatical, spelling, and other transcription errors. Please disregard these  errors.

## 2023-08-10 NOTE — PT/OT/SLP PROGRESS
Speech Language Pathology Treatment    Patient Name:  Steff Johnson   MRN:  [id number]  Admitting Diagnosis: AMS (altered mental status)    Recommendations:                 General Recommendations:  Dysphagia therapy and Speech/language therapy  Diet recommendations:  Puree, Liquid Diet Level: Thin liquids - IDDSI Level 0   Aspiration Precautions: 1 bite/sip at a time, Eliminate distractions, Feed only when awake/alert, HOB to 90 degrees, and Meds crushed in puree   General Precautions: Standard, aspiration, NPO  Communication strategies:  yes/no questions only and provide increased time to answer    Assessment:     Steff Johnson is a 63 y.o. female with an SLP diagnosis of Dysphagia and Cognitive-Linguistic Impairment.  She presents with improved communication and tolerance of pureed textures and thin liquids; however, pt observed to have decreased motivation, as she demonstrated inconsistent participation throughout today's session. Rec initiation of pureed diet w/ thin liquids; however, NGT may continue to be necessary as pt may not consume enough nutrition due to observed lack of desire to have oral intake. Rec communication via yes/no questions, as pt is able to respond when questions repeated and redirection to speaker is provided. Continue POC.    Subjective     Pt awake laying in bed. Verbalized agreement to session. Participated well for majority of session, though as session progressed, pt participation decreased. W/ encouragement, pt continued to respond to questions and completed PO trials.  Patient goals: none stated     Pain/Comfort:       Respiratory Status: Room air    Objective:     Has the patient been evaluated by SLP for swallowing?   Yes  Keep patient NPO? No   Current Respiratory Status:        Pt observed during SLP regulated sips of water via tsp, straw, and pipette straw method. Tsp and pipette sips most successful; improved labial closure around utensils and timely a-p transfer of bolus.  Straw sips resulted in coughing x1 and spitting out x1 over 3 total trials; pt observed to have difficulty coordinating straw sips. Tsp bites of applesauce tolerated well; however, last 2 trials resulted in pt holding bolus for extended period of time. Pt required encouragment and prompts to open oral cavity to intiaite oral transport phase. Pt does not seem to have a desire to eat/drink, but does not refuse food or water when it is presented.     Yes/no questions answered w/ head nods/shakes and verbally w/ 80% accuracy given min-mod encouragement to respond. Pt voicing in 7/8 prompted trials. Pt w/ improved ability to follow directives and move eye gaze/turn head to Right side. Pt does seem to understand; however, she does not seem to have the motivation and/or desire to communicate.    Goals:   Multidisciplinary Problems       SLP Goals          Problem: SLP    Goal Priority Disciplines Outcome   SLP Goal     SLP Ongoing, Progressing   Description: 1. Pt will participate in ongoing assessment of communication and swallow.  2. Pt will tolerate a small ice chip in her mouth without adverse response/turning away (allowing placement of ice chip).  3.  Pt will produce voice with model x 20% of trials.  4.  Pt will respond to yes/no questions with 60% accuracy via any modality.                         Plan:     Patient to be seen:  6 x/week   Plan of Care expires:  08/26/23  Plan of Care reviewed with:  patient, other (see comments) (nursing)   SLP Follow-Up:  Yes       Discharge recommendations:  rehabilitation facility, nursing facility, skilled (w/ ST)   Barriers to Discharge:   oral intake/nutritional status    Time Tracking:     SLP Treatment Date:   08/10/23  Speech Start Time:  1436  Speech Stop Time:  1505     Speech Total Time (min):  29 min    Billable Minutes: Speech Therapy Individual 13 min and Treatment Swallowing Dysfunction 16 min    08/10/2023

## 2023-08-10 NOTE — PROGRESS NOTES
[facility]  Department of Neurology  Progress Note  Date: 2023 2:55 PM          Patient Name: Steff Johnson   MRN: [id number]   : 1960    AGE: 63 y.o.    LOS: 5 days Hospital Day: 6  Admit date: 2023  6:11 PM        HPI per EMR: Steff Johnson is a 63 y.o. female with a history of  stroke with expressive aphasia, right upper extremity paralysis, HTN, DM, COPD.    who presents with worsening AMS.      Patient was diagnosed with COVID a week ago. Patient currently not answering any questions, does not follow commands. Per discussion with ED provider and chart review.      Per her daughter, she  her upfrom physical rehab by her daughter today at approximately 1:00 p.m. and notice that she was not speaking as much as she normally does at home.  At baseline, per her daughter, the patient is able to speak in short phrases.  She does not complete sentences.  Patient arrives in the ED was brought back to the [facility] where she was noted to have a fixed left gaze and not be as interactive as she normally is.       In the ED, patient was hemodynamically stable, CT head and CTA head and neck was negative. Patient was loaded with Keppra in ER with concern for seizures.  Admitted to the hospital for further workup.     Neurology consult:  Patient was seen examined by me.  She tracks however does not answer to questions or follow commands.  Patient's  is at bedside states that he has been like this since her recent admission to the hospital and only would make few words which has also been decreased in the last few days for which he brought to the hospital.  He states that mental status has been worse in the last few days and he noticed that yesterday she was not speaking at all.     In the ER, she was noticed to have fixed left gaze and not as interactive for which a stat EEG was done showed no seizures.  CT head was done showed no acute intracranial pathology and CT angiogram head  and neck showed no large vessel occlusion/high-grade stenosis.     She was recently admitted to the hospital in July 2023 for generalized weakness, dysarthria which is worsened from baseline.  She had an MRI brain at that time showed a small right frontal acute ischemic stroke.  She was discharged home on dual antiplatelet therapy per Neurology recommendations at that time.  Her mental status at that time was awake and oriented x4 with speech being dysarthric and making only few words at a time.  Her examined now seems to be significantly different compared to her prior neurology encounter.     08/06/2023: No acute events overnight. Patient was seen and examined by me this morning. Neuro exam is unchanged. She has L gaze preference and tracks but does not follow or answer to questions    08/07/2023:  Patient was seen examined by me.  Mental status is improved today and she is awake, able to tell me her name and follow commands.  Right upper extremity has increased tone and weak compared to the left upper.    8/8:  Patient was seen and examined.  Exam is almost similar to yesterday however slightly slower to respond compared to yesterday.    8/9:  Patient was seen examined by me.  She was being evaluated by PT and the same time and she was sitting at the side of the bed.  She would tell me her name on repeated questioning however speech was dysarthric and she was able to follow some commands.       Vitals:  Patient Vitals for the past 24 hrs:   BP Temp Temp src Pulse Resp SpO2 Weight   08/09/23 1600 (!) 174/81 -- -- 61 13 97 % --   08/09/23 1500 (!) 141/66 -- -- (!) 58 13 95 % --   08/09/23 1400 (!) 154/72 -- -- (!) 57 13 96 % --   08/09/23 1332 (!) 156/72 -- -- (!) 57 11 99 % --   08/09/23 1300 (!) 161/74 -- -- (!) 57 12 98 % --   08/09/23 1239 (!) 187/91 -- -- -- -- -- --   08/09/23 1200 (!) 171/88 -- -- (!) 53 16 99 % --   08/09/23 1122 (!) 177/84 -- -- 65 -- -- --   08/09/23 1100 (!) 177/84 -- -- (!) 58 11 98 %  --   08/09/23 0905 (!) 149/69 -- -- (!) 55 -- -- --   08/09/23 0900 (!) 149/69 -- -- (!) 57 13 96 % --   08/09/23 0800 (!) 167/78 -- -- (!) 54 (!) 23 98 % --   08/09/23 0701 (!) 166/76 97.6 °F (36.4 °C) Axillary (!) 56 18 97 % --   08/09/23 0700 (!) 166/76 -- -- (!) 56 (!) 21 97 % --   08/09/23 0600 (!) 157/72 -- -- (!) 59 13 98 % --   08/09/23 0553 -- -- -- -- -- -- 58 kg (127 lb 13.9 oz)   08/09/23 0500 (!) 149/112 -- -- (!) 58 13 96 % --   08/09/23 0400 (!) 161/74 -- -- (!) 57 12 97 % --   08/09/23 0332 -- 99.5 °F (37.5 °C) Axillary -- -- -- --   08/09/23 0300 (!) 156/75 -- -- (!) 57 12 95 % --   08/09/23 0200 136/64 -- -- (!) 56 14 96 % --   08/09/23 0100 (!) 157/74 -- -- (!) 55 11 96 % --   08/09/23 0000 (!) 153/71 -- -- (!) 53 12 96 % --   08/08/23 2321 -- 99 °F (37.2 °C) Axillary -- -- -- --   08/08/23 2300 (!) 160/77 -- -- (!) 53 12 96 % --   08/08/23 2200 (!) 160/130 -- -- (!) 57 11 97 % --   08/08/23 2100 (!) 163/78 -- -- (!) 58 12 97 % --   08/08/23 2000 (!) 159/73 -- -- (!) 55 12 98 % --   08/08/23 1936 -- 98.1 °F (36.7 °C) Axillary -- -- -- --     PHYSICAL EXAM:      GENERAL APPEARANCE: Alert, well-developed, female in no acute distress.  HEENT: Normocephalic and atraumatic. PERRL. Oropharynx unremarkable.  PULM: Normal respiratory effort. No accessory muscle use.  CV: RRR.  ABDOMEN: Soft, nontender.  EXTREMITIES: No obvious signs of vascular compromise. Pulses present. No cyanosis, clubbing or edema.  SKIN: Clear; no rashes, lesions or skin breaks in exposed areas.     NEURO:  MENTAL STATUS:  She is awake, tracks and able to tell me her name.  She follows some commands.  She responds however slow. Speech is dysarthric  CRANIAL NERVES:  Pupils equal round and reactive to light.  Extraocular movements are intact, face is with mild right facial droop     MOTOR:  Bulk normal. Tone increase in right upper extremity  Abnormal movements absent.  Tremor: none present.  Strength:  Antigravity in left upper  "extremity and left lower extremity.  Exact strength can not be assessed as patient was slow.  Increased tone in the right upper extremity was noted     REFLEXES:  DTRs 1+ throughout.  Plantar response downgoing bilaterally.  SENSATION: not tested.  COORDINATION:  Could not be tested .  STATION: not tested.  GAIT: not tested.       CURRENT SCHEDULED MEDICATIONS:   amLODIPine  10 mg Oral Daily    aspirin  81 mg Oral Daily    atorvastatin  80 mg Oral QHS    cefTRIAXone (ROCEPHIN) IVPB  1 g Intravenous Q24H    dantrolene  50 mg Oral TID    dexAMETHasone  4 mg Intravenous Q12H    enoxparin  40 mg Subcutaneous Q24H (prophylaxis, 1700)    EScitalopram oxalate  10 mg Oral QHS    insulin detemir U-100 (Levemir)  20 Units Subcutaneous QHS    levETIRAcetam (Keppra) IV (PEDS and ADULTS)  1,000 mg Intravenous Q12H    metoprolol tartrate  100 mg Per G Tube BID    modafiniL  200 mg Oral Daily    mupirocin   Nasal BID    pantoprazole  40 mg Intravenous Daily     CURRENT INFUSIONS:      DATA:  Recent Labs   Lab 08/04/23  1831 08/05/23 0408 08/06/23 0410 08/07/23 0448 08/08/23  0459    144 142 135* 131*   K 5.0 4.3 4.4 4.5 4.2   * 116* 115* 114* 107   CO2 22* 20* 20* 18* 17*   BUN 37* 31* 39* 40* 40*   CREATININE 1.6* 1.2 1.1 0.9 0.8   * 106 276* 324* 290*   CALCIUM 8.9 8.6* 8.6* 8.5* 8.7   AST 34  --   --   --   --    ALT 44  --   --   --   --      Recent Labs   Lab 08/04/23  1831 08/05/23 0408 08/06/23 0410 08/07/23 0448 08/08/23  0459   WBC 16.23* 12.72* 12.02 16.80* 13.98*   HGB 10.7* 10.0* 9.1* 9.6* 9.6*   HCT 30.7* 28.6* 25.5* 26.6* 26.8*    315 359 324 369     No results found for: "PROTEINCSF", "GLUCCSF", "WBCCSF", "RBCCSF", "PMNCSF"  Hemoglobin A1C   Date Value Ref Range Status   08/05/2023 6.4 (H) 4.5 - 6.2 % Final     Comment:     According to ADA guidelines, hemoglobin A1C <7.0% represents  optimal control in non-pregnant diabetic patients.  Different  metrics may apply to specific " populations.   Standards of Medical Care in Diabetes - 2016.    For the purpose of screening for the presence of diabetes:  <5.7%     Consistent with the absence of diabetes  5.7-6.4%  Consistent with increasing risk for diabetes   (prediabetes)  >or=6.5%  Consistent with diabetes    Currently no consensus exists for use of hemoglobin A1C  for diagnosis of diabetes for children.     05/03/2023 10.2 (H) 4.5 - 6.2 % Final     Comment:     According to ADA guidelines, hemoglobin A1C <7.0% represents  optimal control in non-pregnant diabetic patients.  Different  metrics may apply to specific populations.   Standards of Medical Care in Diabetes - 2016.    For the purpose of screening for the presence of diabetes:  <5.7%     Consistent with the absence of diabetes  5.7-6.4%  Consistent with increasing risk for diabetes   (prediabetes)  >or=6.5%  Consistent with diabetes    Currently no consensus exists for use of hemoglobin A1C  for diagnosis of diabetes for children.     02/22/2023 8.9 (H) 4.0 - 5.6 % Final     Comment:     ADA Screening Guidelines:  5.7-6.4%  Consistent with prediabetes  >or=6.5%  Consistent with diabetes    High levels of fetal hemoglobin interfere with the HbA1C  assay. Heterozygous hemoglobin variants (HbS, HgC, etc)do  not significantly interfere with this assay.   However, presence of multiple variants may affect accuracy.              I have personally reviewed and interpreted the pertinent imaging and lab results.  Imaging Results              US Carotid Bilateral (Final result)  Result time 08/04/23 20:42:04      Final result by Brooke Cottrell MD (08/04/23 20:42:04)                   Narrative:    PROCEDURE:    US CAROTID DOPPLER BILATERAL  dated  8/4/2023 7:16 PM CDT    CLINICAL HISTORY:   Female 63 years of age.    TECHNIQUE:  Multiple sagittal and transverse images of the carotid arterial system of the neck were obtained.  2-D vascular structure, Doppler spectral analysis and color flow  Doppler imaging was performed.  All measurements and percent stenoses described below were determined using NASCET criteria or criteria similar to NASCET, as defined by the Society of Radiologist in Ultrasound Consensus Conference Radiology 2003.    PREVIOUS STUDIES:  None Available    FINDINGS:    Right carotid system:  There is no plaque. The peak systolic velocity in the right CCA is 96 cm/sec.  The peak systolic velocity within the internal carotid artery is 81 cm/sec.  The ICA/CCA ratio is calculated as 0.8.  The external carotid artery is patent with antegrade flow. Antegrade flow is present in the vertebral artery.    Left carotid system:  There is no plaque.  The peak systolic velocity in the left CCA is 110 cm/sec.  The peak systolic velocity within the internal carotid artery is 76 cm/sec.  The ICA/CCA ratio is calculated as 0.7.  The external carotid artery is patent with antegrade flow. Antegrade flow is present in the vertebral artery.    IMPRESSION:    Normal bilateral carotid ultrasound.    Electronically signed by:  Brooke Jade MD  8/4/2023 8:42 PM CDT Workstation: [id number]                                     X-Ray Chest AP Portable (Final result)  Result time 08/04/23 19:09:18      Final result by Rj Kellogg MD (08/04/23 19:09:18)                   Narrative:    History: Stroke.    FINDINGS: AP view of the chest is compared to previous study dated July 29, 2023. No focal infiltrate or pleural effusion is seen. Cardiac silhouette is enlarged. Bony thorax appears intact.    IMPRESSION:  1. No acute cardiopulmonary change seen.    Electronically signed by:  Rj Kellogg MD  8/4/2023 7:09 PM CDT Workstation: [id number]                                     CTA Head and Neck (xpd) (Final result)  Result time 08/04/23 18:55:02      Final result by Rj Kellogg MD (08/04/23 18:55:02)                   Narrative:    CMS MANDATED QUALITY DATA - CT RADIATION - 436    All CT scans at this  facility use dose modulation, iterative-reconstruction, and/or weight-based dosing when appropriate to reduce radiation dose to as low as reasonably achievable.    CMS MANDATED QUALITY [id number]    NASCET CRITERIA WAS UTILIZED FOR ESTIMATION OF STENOSIS SEVERITY WITH THE NARROWEST SEGMENT BEING COMPARED TO THE DISTAL LUMINAL DIAMETER.        HISTORY:Stroke/TIA. Determined embolic source.    TECHNIQUE: Serial axial images were obtained from aortic arch through the vertex with 100 cc of Omnipaque 350 intravenous contrast utilizing a CT angiography protocol. Coronal and sagittal MIP CT angiography reconstructions were performed. Patient received approximately 158 mGy-cm of radiation exposure from this exam.    COMPARISON: Comparison to previous study dated July 4, 2023.    FINDINGS:Aortic arch is unremarkable. Great vessel origins appear widely patent. Ectatic patent cervical carotid arteries are visualized. No evidence of significant cervical carotid artery stenosis is seen. Minimal atherosclerotic changes are present at the left cervical carotid bifurcation. Codominant patent vertebral arteries are visualized.    Internal carotid arteries appear widely patent at the skull base. Codominant vertebral arteries are present at the skull base with a widely patent basilar artery. Bilateral anterior, middle, and posterior cerebral arteries appear patent without evidence of significant stenosis. Patent bilateral posterior communicating arteries are identified.    IMPRESSION:  1. No evidence of cervical carotid/vertebral artery occlusion or significant stenosis.  2. No evidence of large vessel intracranial arterial occlusion or significant stenosis is seen.          Electronically signed by:  Rj Kellogg MD  8/4/2023 6:55 PM CDT Workstation: [id number]                                     CT Head Without Contrast (Edited Result - FINAL)  Result time 08/04/23 19:36:33      Edited Result - FINAL by Fozia Le DO  (08/04/23 19:36:33)                   Narrative:         ADDENDUM #1       This report contains critical findings that may be critical to patient care.    At 6:47 PM CST on 8/4/2023 , the report findings were confirmed with Dr. Torrez has received exam report, is aware of the finding(s), and indicated that a call was not necessary to discuss exam findings.        Electronically signed by:  Fozia Le DO  8/4/2023 7:36 PM CDT Workstation: [id number]     ORIGINAL REPORT       CT of the head: 8/4/2023 6:40 PM CDT    HISTORY: 63 years  old Female with Neuro deficit, acute, stroke suspected. Patient found unresponsive, altered mental status    COMPARISON: 7/3/2023    TECHNIQUE: Multiple axial contiguous images were obtained through the head without intravenous contrast administered. This exam was performed according to our departmental dose-optimization program, which includes automated exposure control, adjustment of the mA and/or KV according to the patient's size and/or use of iterative reconstruction technique.    FINDINGS: The ventricles and cerebral sulci demonstrate mild prominence, consistent with cerebral atrophy. There are moderate periventricular hypodensities, suggestive of periventricular white matter changes.    There are similar hypodensities at the left frontal lobe and left basal ganglia.    The gray-white matter differentiation is within normal limits.    Both globes appear symmetric.    The mastoid air cells appear clear.    The visualized paranasal sinuses appear clear.    The calvarium is intact.    No extra-axial fluid collection is seen.    No midline shift or mass effect is apparent.    There are no findings to suggest acute intracranial hemorrhage.    IMPRESSION: 1. No acute intracranial hemorrhage is seen.  2. There is mild to moderate cerebral atrophy and periventricular white matter changes are seen.    Electronically signed by:  Fozia Le DO  8/4/2023 6:41 PM CDT Workstation: [id number]                       Final result by Fozia Le DO (08/04/23 18:41:20)                   Narrative:    CT of the head: 8/4/2023 6:40 PM CDT    HISTORY: 63 years  old Female with Neuro deficit, acute, stroke suspected. Patient found unresponsive, altered mental status    COMPARISON: 7/3/2023    TECHNIQUE: Multiple axial contiguous images were obtained through the head without intravenous contrast administered. This exam was performed according to our departmental dose-optimization program, which includes automated exposure control, adjustment of the mA and/or KV according to the patient's size and/or use of iterative reconstruction technique.    FINDINGS: The ventricles and cerebral sulci demonstrate mild prominence, consistent with cerebral atrophy. There are moderate periventricular hypodensities, suggestive of periventricular white matter changes.    There are similar hypodensities at the left frontal lobe and left basal ganglia.    The gray-white matter differentiation is within normal limits.    Both globes appear symmetric.    The mastoid air cells appear clear.    The visualized paranasal sinuses appear clear.    The calvarium is intact.    No extra-axial fluid collection is seen.    No midline shift or mass effect is apparent.    There are no findings to suggest acute intracranial hemorrhage.    IMPRESSION: 1. No acute intracranial hemorrhage is seen.  2. There is mild to moderate cerebral atrophy and periventricular white matter changes are seen.    Electronically signed by:  Fozia Le DO  8/4/2023 6:41 PM CDT Workstation: [id number]                                            ASSESSMENT AND PLAN:          Encephalopathy  Prior stroke  Seizure like activity  COVID infection     Plan:   Admitted to the hospital with encephalopathy and unable to talk for the past few days with worsening in the last 1 day prior to presentation.  Etiology is unknown however could be seizure with postictal state versus  metabolic/infectious causes  CT head and CT angiogram head and neck done no significant acute abnormalities.  MRI brain showed tiny left parietal lobe infarct.  Punctate infarct in the right regina was also noted.  The small infarcts do not explain patient's mental status.  Stat EEG was done on admission showed no seizures or  Epileptiform activity.  Repeat EEG was done on 08/06 showed generalized periodic discharges with sharp waveform with seizure potential however no seizures were recorded.  2 mg Ativan was given during the study which cleared the background.  Additional loading dose of Keppra 2 g was given and maintenance dose of Keppra was increased to 1000 mg b.i.d..  Continuous long-term EEG monitoring for 23 hours showed no seizures however continues to show intermittent generalized periodic discharges and occasional epileptiform activities.  Aspirin, Lipitor for stroke prevention.  Will hold off on Plavix as patient does microhemorrhages and dual antiplatelet therapy increases risk of intracranial hemorrhage.  Physical and occupational therapy evaluate and treat.  Neuro checks per unit protocol.  Workup/management for metabolic/infectious causes of encephalopathy per primary team.  There was no significant improvement in mental status, repeat EEG was ordered for today  Will follow         Geoffrey Roque MD  Neurology/vascular Neurology  Date of Service: 08/09/2023  2:55 PM    Please note: This note was transcribed using voice recognition software. Because of this technology there are often uinintended grammatical, spelling, and other transcription errors. Please disregard these errors.

## 2023-08-10 NOTE — PT/OT/SLP PROGRESS
Physical Therapy Treatment    Patient Name:  Steff Johnson   MRN:  [id number]    Recommendations:     Discharge Recommendations: nursing facility, skilled  Discharge Equipment Recommendations: to be determined by next level of care  Barriers to discharge:  increased level of assist needed for mobility    Assessment:     Steff Johnson is a 63 y.o. female admitted with a medical diagnosis of AMS (altered mental status).  She presents with the following impairments/functional limitations: weakness, impaired endurance, impaired self care skills, impaired functional mobility, gait instability, impaired balance, impaired cognition, decreased lower extremity function, decreased upper extremity function, abnormal tone, edema, impaired cardiopulmonary response to activity.  Arrived to pt's room and daughter at bedside and states she has come from out of town.  Pt more alert today and turns head to right to look for PT when speaking to pt.  She is answering yes/no questions, using gestures and work approximations to communicate wants and needs.  Pt improved sitting balance today, initially max A but responds well to tactile and visual cues and able to weight shift left and right to find midline on command and hold stationary position.  Pt requires maxA x2 for STS but unable to transfer to chair due to pt being soiled and RN needs BTB for hygiene.     Rehab Prognosis: Fair; patient would benefit from acute skilled PT services to address these deficits and reach maximum level of function.    Recent Surgery: * No surgery found *      Plan:     During this hospitalization, patient to be seen 6 x/week to address the identified rehab impairments via gait training, therapeutic activities, therapeutic exercises, neuromuscular re-education and progress toward the following goals:    Plan of Care Expires:  09/06/23    Subjective     Chief Complaint: weakness  Patient/Family Comments/goals: get better  Pain/Comfort:  Pain Rating 1:  0/10      Objective:     Communicated with [facility] prior to session.  Patient found HOB elevated with bed alarm, blood pressure cuff, del rosario catheter, NG tube, peripheral IV, pulse ox (continuous), telemetry upon PT entry to room.     General Precautions: Standard, aspiration, NPO, fall, aphasia, airborne, contact, droplet  Orthopedic Precautions: N/A  Braces: N/A  Respiratory Status: Room air     Functional Mobility:  Bed Mobility:     Rolling Left:  maximal assistance and of 2 persons  Rolling Right: maximal assistance and of 1 persons  Supine to Sit: maximal assistance and of 2 persons  Sit to Supine: maximal assistance and of 2 persons  Transfers:     Sit to Stand:  maximal assistance and of 2 persons with hand-held assist      AM-PAC 6 CLICK MOBILITY  Turning over in bed (including adjusting bedclothes, sheets and blankets)?: 2  Sitting down on and standing up from a chair with arms (e.g., wheelchair, bedside commode, etc.): 2  Moving from lying on back to sitting on the side of the bed?: 2  Moving to and from a bed to a chair (including a wheelchair)?: 2  Need to walk in hospital room?: 1  Climbing 3-5 steps with a railing?: 1  Basic Mobility Total Score: 10       Treatment & Education:  Pt was educated on the following: call light use, importance of OOB activity and functional mobility to negate the negative effects of prolonged bed rest during this hospitalization, safe transfers/ambulation and discharge planning recommendations/options. Pt performed L LE there ex sitting x 10 reps with vc for proper execution and joint protection: ap, laq, marching, hip abd/add, gs/qs.     Patient left HOB elevated with all lines intact, call button in reach, bed alarm on, RN notified, and RN present..    GOALS:   Multidisciplinary Problems       Physical Therapy Goals          Problem: Physical Therapy    Goal Priority Disciplines Outcome Goal Variances Interventions   Physical Therapy Goal     PT, PT/OT Ongoing,  Progressing     Description: 1. Supine to sit with min Assistance  2. Sit to stand transfer with min Assistance  3.. Bed to chair transfer with assist of 1 using Rolling Walker as needed  4.  Pt will perform bilat LE therex for HEP x5 each w/Vcs.                            Time Tracking:     PT Received On: 08/10/23  PT Start Time: 1034     PT Stop Time: 1057  PT Total Time (min): 23 min     Billable Minutes: Therapeutic Activity 13, and Therapeutic Exercise 10    Treatment Type: Treatment  PT/PTA: PT     Number of PTA visits since last PT visit: 0     08/10/2023

## 2023-08-10 NOTE — ASSESSMENT & PLAN NOTE
MRI this time showed :Tiny subcortical white matter hyperintensity in the left parietal lobe near the vertex on diffusion-weighted imaging, suggesting small acute or subacute ischemic insult  Pt has history of  stroke with expressive aphasia, right upper extremity paralysis,

## 2023-08-10 NOTE — PLAN OF CARE
Problem: Physical Therapy  Goal: Physical Therapy Goal  Description: 1. Supine to sit with min Assistance  2. Sit to stand transfer with min Assistance  3.. Bed to chair transfer with assist of 1 using Rolling Walker as needed  4.  Pt will perform bilat LE therex for HEP x5 each w/Vcs.       Outcome: Ongoing, Progressing

## 2023-08-10 NOTE — SUBJECTIVE & OBJECTIVE
Interval History:       Review of Systems   Unable to perform ROS: Mental status change     Objective:     Vital Signs (Most Recent):  Temp: 97.5 °F (36.4 °C) (08/10/23 0701)  Pulse: 69 (08/10/23 1600)  Resp: 13 (08/10/23 1600)  BP: (!) 151/71 (08/10/23 1600)  SpO2: 100 % (08/10/23 1600) Vital Signs (24h Range):  Temp:  [97.5 °F (36.4 °C)-98.1 °F (36.7 °C)] 97.5 °F (36.4 °C)  Pulse:  [54-73] 69  Resp:  [10-19] 13  SpO2:  [95 %-100 %] 100 %  BP: (137-189)/(62-86) 151/71     Weight: 57 kg (125 lb 10.6 oz)  Body mass index is 25.38 kg/m².    Intake/Output Summary (Last 24 hours) at 8/10/2023 1648  Last data filed at 8/10/2023 1640  Gross per 24 hour   Intake 2044 ml   Output 1575 ml   Net 469 ml         Physical Exam  Vitals and nursing note reviewed.   Constitutional:       General: She is not in acute distress.     Appearance: She is ill-appearing.   HENT:      Head: Atraumatic.      Right Ear: External ear normal.      Left Ear: External ear normal.      Nose: Nose normal.      Mouth/Throat:      Mouth: Mucous membranes are dry.   Cardiovascular:      Rate and Rhythm: Normal rate.   Pulmonary:      Effort: Pulmonary effort is normal.   Abdominal:      Palpations: Abdomen is soft.   Musculoskeletal:         General: Normal range of motion.      Cervical back: Normal range of motion.   Skin:     General: Skin is warm.   Neurological:      Comments: aphasic             Significant Labs: All pertinent labs within the past 24 hours have been reviewed.  CBC:   Recent Labs   Lab 08/10/23  0551   WBC 17.67*   HGB 9.7*   HCT 27.2*        CMP:   Recent Labs   Lab 08/10/23  0550   *   K 4.3      CO2 18*   *   BUN 42*   CREATININE 1.1   CALCIUM 8.6*   ANIONGAP 11       Significant Imaging: I have reviewed all pertinent imaging results/findings within the past 24 hours.

## 2023-08-10 NOTE — PROCEDURES
EEG REPORT    NAME: Steff Johnson  : 1960  MRN: [id number]    DATE of EE2023    CLINICAL INDICATION: This is a 63 y.o. female with history of stroke and seizures being evaluated for AMS.    MEDICATIONS:    amLODIPine  10 mg Oral Daily    aspirin  81 mg Oral Daily    atorvastatin  80 mg Oral QHS    cefTRIAXone (ROCEPHIN) IVPB  1 g Intravenous Q24H    dantrolene  50 mg Oral TID    dexAMETHasone  4 mg Intravenous Q12H    enoxparin  40 mg Subcutaneous Q24H (prophylaxis, 1700)    EScitalopram oxalate  10 mg Oral QHS    insulin detemir U-100 (Levemir)  20 Units Subcutaneous QHS    levETIRAcetam (Keppra) IV (PEDS and ADULTS)  1,000 mg Intravenous Q12H    metoprolol tartrate  100 mg Per G Tube BID    modafiniL  200 mg Oral Daily    pantoprazole  40 mg Intravenous Daily         EEG DESCRIPTION:  A 16-channel EEG was performed with electrode placement in 10/20 International System. Longitudinal bipolar and referential montages were utilized in analysis.    This is a technically adequate study with minor muscle and motion artifacts.    The dominant posterior background rhythm was theta frequency rhythm with periods of left hemisphere delta slowing. There is periods of generalized delta slowing with frontally predominant triphasic waves. The record was reactive to eye opening and closure. Stage II sleep structures were not seen     Photic stimulation and Hyperventilation  was not performed.    No epileptiform discharges were recorded. No electrographic or electroclinical seizures were recorded.    DIAGNOSIS: This is a abnormal EEG due to the presence of moderate generalised slowing with periods of excess slowing on the left hemisphere. No epileptiform features are noted. No electrographic or electroclinical seizures are recorded.    CLINICAL INTERPRETATION:  This is an abnormal EEG indicating moderate encephalopathy with excess slowing in the left hemisphere. A left hemispheric structural lesion needs to be ruled  out.       Geoffrey Roque MD  Neurology

## 2023-08-10 NOTE — PT/OT/SLP PROGRESS
Occupational Therapy      Patient Name:  Steff Johnson   MRN:  [id number]    Patient not seen today secondary to Patient fatigue; lethargic and non-participatory at time of attempt.      8/10/2023

## 2023-08-10 NOTE — PLAN OF CARE
08/09/23 2000   Patient Assessment/Suction   Respiratory Effort Unlabored   PRE-TX-O2   Device (Oxygen Therapy) room air   SpO2 99 %   Pulse Oximetry Type Continuous   $ Pulse Oximetry - Multiple Charge Pulse Oximetry - Multiple   Pulse 60   Resp 13

## 2023-08-10 NOTE — ASSESSMENT & PLAN NOTE
Condition improving   Started on puree diet  Overall prognosis is very poor  Medically stable to go to SNF

## 2023-08-10 NOTE — PROGRESS NOTES
[facility] Medicine  Progress Note    Patient Name: Steff Johnson  MRN: [id number]  Patient Class: IP- Inpatient   Admission Date: 8/4/2023  Length of Stay: 6 days  Attending Physician: Roger Berry MD  Primary Care Provider: Cristina Ren MD        Subjective:     Principal Problem:Acute encephalopathy        HPI:  Steff Johnson is a 63 y.o. Black or  female   With PMH of stroke with expressive aphasia, right upper extremity paralysis, HTN, DM, COPD.    who presents with worsening AMS.      Patient was diagnosed with COVID a week ago. Patient currently not answering any questions, does not follow commands. Per discussion with ED provider and chart review.      Per her daughter, she  her upfrom physical rehab by her daughter today at approximately 1:00 p.m. and notice that she was not speaking as much as she normally does at home.  At baseline, per her daughter, the patient is able to speak in short phrases.  She does not complete sentences.  Patient arrives in the ED was brought back to the [facility] where she was noted to have a fixed left gaze and not be as interactive as she normally is.       In the ED, patient was hemodynamically stable, CT head and CTA head and neck was negative. Patient was loaded with Keppra. Neuro was consulted from the ED. Recommended admission for EEG and MRI brain.          Overview/Hospital Course:  08/05  Assumed care.Chart reviewed. Consultant's attendance/procedure noted and appreciated. SLP attendance noted/appreciated.  Labs reviewed and noted fully below: leukocytosis improving with stable normocytic anemia; normal cations with improving stage 3a renal dysfunction. Telemetry reviewed: sinus.  (boyfriend X 20 years) at bedside. Patient remains encephalopathic. Receiving levetiracetam, no further seizure like activity noted.  Plan: Continue current regimen/course; Neurology consultation pending; will start low dose   "dexamethasone, and Clinamix; AM labs for review.    08/06  Consultant's attendance noted and appreciated. Labs reviewed and noted fully below: no leukocytosis with mild microcytic anemia; normal electrolytes with stage 3a renal dysfunction (normal TSH noted). Telemetry reviewed: sinus. Patient is awake with eyes open. However she does not respond. RN states she tracked with eyes to left for Neuro, and took a "Teaspoon of water" for SLP but no purposeful response since then. MR Brain ordered, yet pending. Receiving EEG currently (started just after exam).  Plan: Continue current course/regimen--adding PPI because she is receiving dexamethasone; for MRI; AM labs for review    08/07  Discussed patient with Neurology: not true status, but epileptiform seen on EEG. Reloaded with levetiracetam, and given lorazepam yesterday. Labs reviewed and noted below: mild leukocytosis with stable mild microcytic anemia; trivial hyponatremia with normal renal function and moderate prerenal azotemia. MRI Brain ordered, yet pending. Telemetry reviewed: sinus. Patient remains encephalopathic and non-communicative. Plan: Continue current course; for MRI this evening; AM labs for review; will need long term placement.    08/08  Discussed with Neurology: for MRI. Overall remains the same neurologically. Discussed with RN: patient unable to swallow: will need NGT (placed), tube feeds and Toprol XL change to the tartrate formulation to be able to crush the medication. Plan: NGT, continue current course except for changing metoprolol to be able to crush; for MRI; will empirically give dose ceftriaxone; if MRI non-contributory as to etiology will discuss LP with Neuro    08/09  Dx: Seizure Disorder; Altered  Discussed with Neuro: no clear cut etiology of alteration yet to be determined. He will again perform 23 hour EEG. Patient is more alert today. She did briefly fixate during exam and tracked, but drifted off after approximately 15-20 " seconds. Neuro reported that she made sounds, and followed simple command, but this was not apparent during this exam. She does however appear more alert than previous exams.  Telemetry: sinus. Lbs mild hyperglycemia.  Plan: Long term EEG; Continue current regimen/course; AM labs for review.    08/10  Started on puree diet  Transferred out of cardio A  Medically table to go to SNF       Interval History:       Review of Systems   Unable to perform ROS: Mental status change     Objective:     Vital Signs (Most Recent):  Temp: 97.5 °F (36.4 °C) (08/10/23 0701)  Pulse: 69 (08/10/23 1600)  Resp: 13 (08/10/23 1600)  BP: (!) 151/71 (08/10/23 1600)  SpO2: 100 % (08/10/23 1600) Vital Signs (24h Range):  Temp:  [97.5 °F (36.4 °C)-98.1 °F (36.7 °C)] 97.5 °F (36.4 °C)  Pulse:  [54-73] 69  Resp:  [10-19] 13  SpO2:  [95 %-100 %] 100 %  BP: (137-189)/(62-86) 151/71     Weight: 57 kg (125 lb 10.6 oz)  Body mass index is 25.38 kg/m².    Intake/Output Summary (Last 24 hours) at 8/10/2023 1648  Last data filed at 8/10/2023 1640  Gross per 24 hour   Intake 2044 ml   Output 1575 ml   Net 469 ml         Physical Exam  Vitals and nursing note reviewed.   Constitutional:       General: She is not in acute distress.     Appearance: She is ill-appearing.   HENT:      Head: Atraumatic.      Right Ear: External ear normal.      Left Ear: External ear normal.      Nose: Nose normal.      Mouth/Throat:      Mouth: Mucous membranes are dry.   Cardiovascular:      Rate and Rhythm: Normal rate.   Pulmonary:      Effort: Pulmonary effort is normal.   Abdominal:      Palpations: Abdomen is soft.   Musculoskeletal:         General: Normal range of motion.      Cervical back: Normal range of motion.   Skin:     General: Skin is warm.   Neurological:      Comments: aphasic             Significant Labs: All pertinent labs within the past 24 hours have been reviewed.  CBC:   Recent Labs   Lab 08/10/23  0551   WBC 17.67*   HGB 9.7*   HCT 27.2*         CMP:   Recent Labs   Lab 08/10/23  0550   *   K 4.3      CO2 18*   *   BUN 42*   CREATININE 1.1   CALCIUM 8.6*   ANIONGAP 11       Significant Imaging: I have reviewed all pertinent imaging results/findings within the past 24 hours.      Assessment/Plan:      * Acute encephalopathy  Condition improving   Started on puree diet  Overall prognosis is very poor  Medically stable to go to SNF       CVA (cerebral vascular accident)  MRI this time showed :Tiny subcortical white matter hyperintensity in the left parietal lobe near the vertex on diffusion-weighted imaging, suggesting small acute or subacute ischemic insult  Pt has history of  stroke with expressive aphasia, right upper extremity paralysis,    Seizure disorder  Maintain keppra      Aphasia  Per chart review       Cognitive communication deficit  Per chart review       Type 2 diabetes mellitus with both eyes affected by moderate nonproliferative retinopathy and macular edema, with long-term current use of insulin  Maintain insulin regime      VTE Risk Mitigation (From admission, onward)         Ordered     enoxaparin injection 40 mg  Every 24 hours         08/05/23 1838     IP VTE HIGH RISK PATIENT  Once         08/04/23 2233     Place sequential compression device  Until discontinued         08/04/23 2233                Discharge Planning   ALEX: 8/11/2023     Code Status: Full Code   Is the patient medically ready for discharge?:     Reason for patient still in hospital (select all that apply): Pending disposition  Discharge Plan A: Skilled Nursing Facility   Discharge Delays: None known at this time              Roger Berry MD  Department of Hospital Medicine   [facility]

## 2023-08-11 ENCOUNTER — HOSPITAL ENCOUNTER (OUTPATIENT)
Facility: HOSPITAL | Age: 63
Discharge: NURSING, REHAB OR LONG-TERM CARE | End: 2023-08-16
Attending: STUDENT IN AN ORGANIZED HEALTH CARE EDUCATION/TRAINING PROGRAM | Admitting: INTERNAL MEDICINE
Payer: MEDICARE

## 2023-08-11 VITALS
WEIGHT: 127.88 LBS | HEART RATE: 58 BPM | SYSTOLIC BLOOD PRESSURE: 155 MMHG | BODY MASS INDEX: 25.78 KG/M2 | RESPIRATION RATE: 19 BRPM | HEIGHT: 59 IN | DIASTOLIC BLOOD PRESSURE: 68 MMHG | TEMPERATURE: 98 F | OXYGEN SATURATION: 100 %

## 2023-08-11 DIAGNOSIS — G93.40 ENCEPHALOPATHY: ICD-10-CM

## 2023-08-11 DIAGNOSIS — J44.9 CHRONIC OBSTRUCTIVE PULMONARY DISEASE, UNSPECIFIED COPD TYPE: ICD-10-CM

## 2023-08-11 DIAGNOSIS — N39.0 URINARY TRACT INFECTION WITHOUT HEMATURIA, SITE UNSPECIFIED: ICD-10-CM

## 2023-08-11 DIAGNOSIS — I10 HYPERTENSION: ICD-10-CM

## 2023-08-11 DIAGNOSIS — R07.9 CHEST PAIN: ICD-10-CM

## 2023-08-11 DIAGNOSIS — R53.81 PHYSICAL DECONDITIONING: Primary | ICD-10-CM

## 2023-08-11 PROBLEM — G40.909 SEIZURE DISORDER: Status: RESOLVED | Noted: 2023-08-10 | Resolved: 2023-08-11

## 2023-08-11 LAB
ANION GAP SERPL CALC-SCNC: 8 MMOL/L (ref 8–16)
BUN SERPL-MCNC: 32 MG/DL (ref 8–23)
CALCIUM SERPL-MCNC: 8.8 MG/DL (ref 8.7–10.5)
CHLORIDE SERPL-SCNC: 109 MMOL/L (ref 95–110)
CO2 SERPL-SCNC: 21 MMOL/L (ref 23–29)
CREAT SERPL-MCNC: 0.9 MG/DL (ref 0.5–1.4)
ERYTHROCYTE [DISTWIDTH] IN BLOOD BY AUTOMATED COUNT: 14.6 % (ref 11.5–14.5)
EST. GFR  (NO RACE VARIABLE): >60 ML/MIN/1.73 M^2
GLUCOSE SERPL-MCNC: 80 MG/DL (ref 70–110)
GLUCOSE SERPL-MCNC: 84 MG/DL (ref 70–110)
GLUCOSE SERPL-MCNC: 89 MG/DL (ref 70–110)
HCT VFR BLD AUTO: 26.7 % (ref 37–48.5)
HGB BLD-MCNC: 9.5 G/DL (ref 12–16)
MCH RBC QN AUTO: 27.9 PG (ref 27–31)
MCHC RBC AUTO-ENTMCNC: 35.6 G/DL (ref 32–36)
MCV RBC AUTO: 79 FL (ref 82–98)
PLATELET # BLD AUTO: 429 K/UL (ref 150–450)
PMV BLD AUTO: 10.5 FL (ref 9.2–12.9)
POTASSIUM SERPL-SCNC: 4 MMOL/L (ref 3.5–5.1)
RBC # BLD AUTO: 3.4 M/UL (ref 4–5.4)
SODIUM SERPL-SCNC: 138 MMOL/L (ref 136–145)
WBC # BLD AUTO: 17.41 K/UL (ref 3.9–12.7)

## 2023-08-11 PROCEDURE — 97110 THERAPEUTIC EXERCISES: CPT

## 2023-08-11 PROCEDURE — 36415 COLL VENOUS BLD VENIPUNCTURE: CPT | Performed by: INTERNAL MEDICINE

## 2023-08-11 PROCEDURE — 85027 COMPLETE CBC AUTOMATED: CPT | Performed by: INTERNAL MEDICINE

## 2023-08-11 PROCEDURE — 80048 BASIC METABOLIC PNL TOTAL CA: CPT | Performed by: INTERNAL MEDICINE

## 2023-08-11 PROCEDURE — 94761 N-INVAS EAR/PLS OXIMETRY MLT: CPT

## 2023-08-11 PROCEDURE — 99285 EMERGENCY DEPT VISIT HI MDM: CPT

## 2023-08-11 PROCEDURE — 97530 THERAPEUTIC ACTIVITIES: CPT

## 2023-08-11 PROCEDURE — 25000003 PHARM REV CODE 250: Performed by: INTERNAL MEDICINE

## 2023-08-11 PROCEDURE — 63600175 PHARM REV CODE 636 W HCPCS: Performed by: INTERNAL MEDICINE

## 2023-08-11 PROCEDURE — 92526 ORAL FUNCTION THERAPY: CPT

## 2023-08-11 RX ORDER — LEVETIRACETAM 1000 MG/1
1000 TABLET ORAL 2 TIMES DAILY
Qty: 60 TABLET | Refills: 0 | Status: SHIPPED | OUTPATIENT
Start: 2023-08-11 | End: 2023-10-27

## 2023-08-11 RX ADMIN — ASPIRIN 81 MG CHEWABLE TABLET 81 MG: 81 TABLET CHEWABLE at 08:08

## 2023-08-11 RX ADMIN — AMLODIPINE BESYLATE 10 MG: 5 TABLET ORAL at 08:08

## 2023-08-11 RX ADMIN — MODAFINIL 200 MG: 100 TABLET ORAL at 08:08

## 2023-08-11 RX ADMIN — HYDRALAZINE HYDROCHLORIDE 10 MG: 20 INJECTION, SOLUTION INTRAMUSCULAR; INTRAVENOUS at 03:08

## 2023-08-11 RX ADMIN — LEVETIRACETAM 1000 MG: 10 INJECTION INTRAVENOUS at 08:08

## 2023-08-11 RX ADMIN — FAMOTIDINE 20 MG: 20 TABLET ORAL at 08:08

## 2023-08-11 NOTE — PLAN OF CARE
Problem: Oral Intake Inadequate  Goal: Improved Oral Intake  Outcome: Ongoing, Progressing  Intervention: Promote and Optimize Oral Intake  Flowsheets (Taken 8/11/2023 1249)  Oral Nutrition Promotion: calorie-dense liquids provided

## 2023-08-11 NOTE — NURSING
IV d/c. NG d/c tolerated well. DC instructions follow up apps and meds reviewed with pt. Tried calling SO and daughter, didn't answer. Pt being transported home with all belongings, NAD.

## 2023-08-11 NOTE — CARE UPDATE
08/11/23 0733   Patient Assessment/Suction   Level of Consciousness (AVPU) alert   PRE-TX-O2   Device (Oxygen Therapy) room air   Pulse Oximetry Type Continuous   $ Pulse Oximetry - Multiple Charge Pulse Oximetry - Multiple

## 2023-08-11 NOTE — PT/OT/SLP PROGRESS
Speech Language Pathology Treatment    Patient Name:  Steff Johnson   MRN:  [id number]  Admitting Diagnosis: Acute encephalopathy    Recommendations:                 General Recommendations:  Dysphagia therapy and Cognitive-linguistic therapy  Diet recommendations:  Puree Diet - IDDSI Level 4, Liquid Diet Level: Thin liquids - IDDSI Level 0; may continue to require additional non-oral meals of nutrition as pt not eating enough by mouth at this time  Aspiration Precautions:  1 bite/sip at a time, Eliminate distractions, Feed only when awake/alert, HOB to 90 degrees, and Meds crushed in puree     General Precautions: Standard, aspiration  Communication strategies:  yes/no questions only and provide increased time to answer    Assessment:     Steff Johnson is a 63 y.o. female with an SLP diagnosis of Dysphagia and Cognitive-Linguistic Impairment.  She presents with decreased communication and participation during today's session. High levels of encouragement to consume lunch tray w/ help of RN and SLP. Pt holding food, though she was observed to have timely oral transport/pharyngeal swallow initiation. Rec continue pureed diet w/ thin liquids via tsp sips and additional non-oral means of nutrition, as pt not consuming enough PO at this time. Continue recs for SNF.    Subjective     Pt awake being fed lunch tray by RN. Pt w/ decreased participation once SLP entered room, per RN report.  Patient goals: none stated     Pain/Comfort:       Respiratory Status: Room air    Objective:     Has the patient been evaluated by SLP for swallowing?   Yes  Keep patient NPO? No   Current Respiratory Status:        Pt observed during tsp bites pureed lunch tray w/ tsp sips thin liquid. Initially, pt w/ timely oral transport and pharyngeal swallow initiation; however, as trials continued, pt w/ increase in oral holding. High levels of verbal encouragement to initiate oral transport and pharyngeal swallow; pt continued w/ prolonged oral  holding w/ eventual swallow initiation. Mild-moderate lingual residue cleared w/ liquid wash x3. Pt closing eyes, refusing to open oral cavity for more intake or for SLP to check for oral clearance. Suction was inserted to clear any remaining residue from lunch; pt resisting suction. Oral cavity was observed to be clear prior to leaving room. No overt s/s aspiration noted across trials; adequate laryngeal movement across trials. Pt did not initiate or respond to any questions during session; no verbalization or head nods/shakes in response to communication attempts.    Goals:   Multidisciplinary Problems       SLP Goals          Problem: SLP    Goal Priority Disciplines Outcome   SLP Goal     SLP Ongoing, Progressing   Description: 1. Pt will participate in ongoing assessment of communication and swallow.  2. Pt will tolerate a small ice chip in her mouth without adverse response/turning away (allowing placement of ice chip).  3.  Pt will produce voice with model x 20% of trials.  4.  Pt will respond to yes/no questions with 60% accuracy via any modality.                         Plan:     Patient to be seen:  6 x/week   Plan of Care expires:  08/26/23  Plan of Care reviewed with:  patient, other (see comments) (nursing)   SLP Follow-Up:  Yes       Discharge recommendations:  nursing facility, skilled   Barriers to Discharge:   nutritional status    Time Tracking:     SLP Treatment Date:   08/11/23  Speech Start Time:  1235  Speech Stop Time:  1251     Speech Total Time (min):  16 min    Billable Minutes: Treatment Swallowing Dysfunction 16 min    08/11/2023

## 2023-08-11 NOTE — PLAN OF CARE
Patient to transition to home health services with [facility], verified start of care date is 8/13/23 per Rajendra at [facility] [phone].  ADT30 order placed for ambulance transportation set up for travel to home.   Discharge orders and chart reviewed with no further post-acute discharge needs identified at this time.  At this time, patient is cleared for discharge from Case Management standpoint.        08/11/23 1446   Final Note   Assessment Type Final Discharge Note   Anticipated Discharge Disposition Home-[facility]   Hospital Resources/Appts/Education Provided Appointments scheduled and added to AVS   Post-Acute Status   Post-Acute Authorization Home Health   Home Health Status Set-up Complete/Auth obtained   Coverage [employer] [facility] HMO PPO SPECIAL NEEDS   Patient choice form signed by patient/caregiver List with quality metrics by geographic area provided;List from CMS Compare;List from System Post-Acute Care   Discharge Delays None known at this time

## 2023-08-11 NOTE — PT/OT/SLP PROGRESS
Physical Therapy Treatment    Patient Name:  Steff Johnson   MRN:  [id number]    Recommendations:     Discharge Recommendations: rehabilitation facility, nursing facility, skilled  Discharge Equipment Recommendations: to be determined by next level of care  Barriers to discharge:  high level of assist needed for all mobility and care    Assessment:     Steff Johnson is a 63 y.o. female admitted with a medical diagnosis of Acute encephalopathy.  She presents with the following impairments/functional limitations: weakness, impaired endurance, impaired self care skills, impaired functional mobility, gait instability, impaired balance, impaired cognition, decreased coordination, decreased upper extremity function, decreased lower extremity function, abnormal tone, decreased ROM, impaired skin, edema, impaired cardiopulmonary response to activity.  Pt lying in bed with HOB elevated today, no family present at bedside.  She is alert but not following commands.  Responds to PT voice but does not make eye contact. Not able to state name today.  Intermittently attempts to follow commands 25% of instructions. Pt with limited mobility due to profound weakness, requires max Assist x2 caregivers.  Sit to stand attempts pt able to accept minimal weight on bilat Les and stand 45 seconds, 2nd attempt unable to accept weight and knees buckling. Pt would benefit from skilled nursing facility to facilitate improvement in strength and function in order to facilitate safe discharge home at later date.     Rehab Prognosis: Fair; patient would benefit from acute skilled PT services to address these deficits and reach maximum level of function.    Recent Surgery: * No surgery found *      Plan:     During this hospitalization, patient to be seen 6 x/week to address the identified rehab impairments via gait training, therapeutic activities, therapeutic exercises, neuromuscular re-education and progress toward the following goals:    Plan  of Care Expires:  09/06/23    Subjective     Chief Complaint: aphasic, non-verbal today and minimal eye contact. Occasionally answers questions by squeezing therapist hand for yes/no questions. No family at bedside during session today  Patient/Family Comments/goals: go home  Pain/Comfort:  Pain Rating 1: 0/10      Objective:     Communicated with RADHA Hart prior to session.  Patient found HOB elevated with telemetry, NG tube, PureWick, blood pressure cuff, pulse ox (continuous), peripheral IV upon PT entry to room.     General Precautions: Standard, airborne, contact, droplet, fall  Orthopedic Precautions: N/A  Braces: N/A  Respiratory Status: Room air     Functional Mobility:  Bed Mobility:     Rolling Left:  maximal assistance  Rolling Right: maximal assistance  Supine to Sit: maximal assistance and of 2 persons  Sit to Supine: maximal assistance and of 2 persons  Transfers:     Sit to Stand:  maximal assistance and of 2 persons with hand-held assist      AM-PAC 6 CLICK MOBILITY  Turning over in bed (including adjusting bedclothes, sheets and blankets)?: 2  Sitting down on and standing up from a chair with arms (e.g., wheelchair, bedside commode, etc.): 2  Moving from lying on back to sitting on the side of the bed?: 2  Moving to and from a bed to a chair (including a wheelchair)?: 2  Need to walk in hospital room?: 1  Climbing 3-5 steps with a railing?: 1  Basic Mobility Total Score: 10       Treatment & Education:  Pt was educated on the following: call light use, importance of OOB activity and functional mobility to negate the negative effects of prolonged bed rest during this hospitalization, safe transfers/ambulation and discharge planning recommendations/options.     Patient left HOB elevated with all lines intact, call button in reach, bed alarm on, RN notified, and RN present..    GOALS:   Multidisciplinary Problems       Physical Therapy Goals          Problem: Physical Therapy    Goal Priority  Disciplines Outcome Goal Variances Interventions   Physical Therapy Goal     PT, PT/OT Ongoing, Progressing     Description: 1. Supine to sit with min Assistance  2. Sit to stand transfer with min Assistance  3.. Bed to chair transfer with assist of 1 using Rolling Walker as needed  4.  Pt will perform bilat LE therex for HEP x5 each w/Vcs.                            Time Tracking:     PT Received On: 08/11/23  PT Start Time: 1209     PT Stop Time: 1233  PT Total Time (min): 24 min     Billable Minutes: Therapeutic Activity 24    Treatment Type: Treatment  PT/PTA: PT     Number of PTA visits since last PT visit: 0     08/11/2023

## 2023-08-11 NOTE — PLAN OF CARE
[facility] accepted patient, verified SOC date 8/13/23 with Rajendra at [phone].  ADT30 order placed for ambulance transportation home.

## 2023-08-11 NOTE — PLAN OF CARE
Met with patient and Nish Mrati (significant other) [phone] at bedside to discuss SNF/LTAC recommendation. Patient unable to respond or make medical decisions due to clinical issues. Nish declines SNF or LTAC placement and wishes to take patient home with home health services. Above information relayed to Dr. Berry who is in agreement with home health disposition.

## 2023-08-11 NOTE — PROGRESS NOTES
Critical access hospital  Department of Neurology  Progress Note  Date: 2023 2:55 PM          Patient Name: Steff Johnson   MRN: [id number]   : 1960    AGE: 63 y.o.    LOS: 7 days Hospital Day: 8  Admit date: 2023  6:11 PM        HPI per EMR: Steff Johnson is a 63 y.o. female with a history of  stroke with expressive aphasia, right upper extremity paralysis, HTN, DM, COPD.    who presents with worsening AMS.      Patient was diagnosed with COVID a week ago. Patient currently not answering any questions, does not follow commands. Per discussion with ED provider and chart review.      Per her daughter, she  her upfrom physical rehab by her daughter today at approximately 1:00 p.m. and notice that she was not speaking as much as she normally does at home.  At baseline, per her daughter, the patient is able to speak in short phrases.  She does not complete sentences.  Patient arrives in the ED was brought back to the [facility] where she was noted to have a fixed left gaze and not be as interactive as she normally is.       In the ED, patient was hemodynamically stable, CT head and CTA head and neck was negative. Patient was loaded with Keppra in ER with concern for seizures.  Admitted to the hospital for further workup.     Neurology consult:  Patient was seen examined by me.  She tracks however does not answer to questions or follow commands.  Patient's  is at bedside states that he has been like this since her recent admission to the hospital and only would make few words which has also been decreased in the last few days for which he brought to the hospital.  He states that mental status has been worse in the last few days and he noticed that yesterday she was not speaking at all.     In the ER, she was noticed to have fixed left gaze and not as interactive for which a stat EEG was done showed no seizures.  CT head was done showed no acute intracranial pathology and CT angiogram head  and neck showed no large vessel occlusion/high-grade stenosis.     She was recently admitted to the hospital in July 2023 for generalized weakness, dysarthria which is worsened from baseline.  She had an MRI brain at that time showed a small right frontal acute ischemic stroke.  She was discharged home on dual antiplatelet therapy per Neurology recommendations at that time.  Her mental status at that time was awake and oriented x4 with speech being dysarthric and making only few words at a time.  Her examined now seems to be significantly different compared to her prior neurology encounter.     08/06/2023: No acute events overnight. Patient was seen and examined by me this morning. Neuro exam is unchanged. She has L gaze preference and tracks but does not follow or answer to questions    08/07/2023:  Patient was seen examined by me.  Mental status is improved today and she is awake, able to tell me her name and follow commands.  Right upper extremity has increased tone and weak compared to the left upper.    8/8:  Patient was seen and examined.  Exam is almost similar to yesterday however slightly slower to respond compared to yesterday.    8/9:  Patient was seen examined by me.  She was being evaluated by PT and the same time and she was sitting at the side of the bed.  She would tell me her name on repeated questioning however speech was dysarthric and she was able to follow some commands.    8/10:  Patient was seen and examined by me.  Patient's daughter is at bedside.  Her mental status is slightly better today, she is awake, able to tell me her name and daughter's name.  She is not oriented to place or time.  She is able to follow commands.  EEG was repeated yesterday showed no seizures.    8/11:  Patient was seen examined by me.  Mental status is similar to yesterday.  No acute events overnight.       Vitals:  Patient Vitals for the past 24 hrs:   BP Temp Temp src Pulse Resp SpO2 Weight   08/11/23 1100 (!)  160/75 -- -- (!) 56 15 100 % --   08/11/23 1000 (!) 147/71 -- -- (!) 52 11 100 % --   08/11/23 0900 (!) 151/70 -- -- (!) 55 13 100 % --   08/11/23 0806 (!) 148/72 -- -- -- -- -- --   08/11/23 0800 (!) 148/72 -- -- (!) 56 10 100 % --   08/11/23 0733 -- -- -- (!) 53 12 100 % --   08/11/23 0701 (!) 145/68 98.2 °F (36.8 °C) Axillary (!) 59 10 100 % --   08/11/23 0600 (!) 166/77 -- -- (!) 54 16 100 % 58 kg (127 lb 13.9 oz)   08/11/23 0500 (!) 161/75 -- -- (!) 55 12 100 % --   08/11/23 0400 (!) 159/74 -- -- (!) 54 12 100 % --   08/11/23 0327 (!) 179/87 98.2 °F (36.8 °C) Axillary -- -- -- --   08/11/23 0300 (!) 171/79 -- -- (!) 51 15 100 % --   08/11/23 0200 (!) 184/86 -- -- (!) 53 14 100 % --   08/11/23 0100 (!) 168/80 -- -- (!) 52 11 100 % --   08/11/23 0000 (!) 184/82 -- -- (!) 52 12 100 % --   08/10/23 2300 (!) 188/81 98.4 °F (36.9 °C) Axillary (!) 53 12 100 % --   08/10/23 2200 (!) 165/76 -- -- (!) 56 14 100 % --   08/10/23 2107 (!) 178/83 -- -- 69 -- -- --   08/10/23 2100 (!) 174/83 -- -- 63 12 100 % --   08/10/23 2030 -- -- -- 65 14 100 % --   08/10/23 2000 (!) 162/77 -- -- 63 11 100 % --   08/10/23 1901 -- -- -- 67 14 100 % --   08/10/23 1900 (!) 166/77 -- -- 69 14 100 % --   08/10/23 1856 -- 98.6 °F (37 °C) Oral -- -- -- --   08/10/23 1800 (!) 162/76 -- -- 69 13 100 % --   08/10/23 1715 (!) 161/76 -- -- 70 11 100 % --   08/10/23 1600 (!) 151/71 -- -- 69 13 100 % --   08/10/23 1500 (!) 166/78 -- -- 73 14 100 % --   08/10/23 1400 (!) 156/71 -- -- 70 17 100 % --   08/10/23 1300 (!) 160/74 -- -- 72 10 100 % --     PHYSICAL EXAM:      GENERAL APPEARANCE: Alert, well-developed, female in no acute distress.  HEENT: Normocephalic and atraumatic. PERRL. Oropharynx unremarkable.  PULM: Normal respiratory effort. No accessory muscle use.  CV: RRR.  ABDOMEN: Soft, nontender.  EXTREMITIES: No obvious signs of vascular compromise. Pulses present. No cyanosis, clubbing or edema.  SKIN: Clear; no rashes, lesions or skin breaks in  exposed areas.     NEURO:  MENTAL STATUS:  She is awake, tracks and able to tell me her name and her daughter's name.  She follows some commands.  She responds however slow. Speech is dysarthric  CRANIAL NERVES:  Pupils equal round and reactive to light.  Extraocular movements are intact, face is with L facial droop     MOTOR:  Bulk normal. Tone increase in right upper extremity  Abnormal movements absent.  Tremor: none present.  Strength:  Antigravity in left upper extremity and left lower extremity.  Exact strength can not be assessed as patient was slow.  Increased tone in the right upper extremity was noted     REFLEXES:  DTRs 1+ throughout.  Plantar response downgoing bilaterally.  SENSATION: not tested.  COORDINATION:  Could not be tested .  STATION: not tested.  GAIT: not tested.       CURRENT SCHEDULED MEDICATIONS:   amLODIPine  10 mg Oral Daily    aspirin  81 mg Oral Daily    atorvastatin  80 mg Oral QHS    dantrolene  50 mg Oral TID    enoxparin  40 mg Subcutaneous Q24H (prophylaxis, 1700)    EScitalopram oxalate  10 mg Oral QHS    famotidine  20 mg Per NG tube Daily    insulin detemir U-100 (Levemir)  20 Units Subcutaneous QHS    levETIRAcetam (Keppra) IV (PEDS and ADULTS)  1,000 mg Intravenous Q12H    metoprolol tartrate  100 mg Per G Tube BID    modafiniL  200 mg Oral Daily     CURRENT INFUSIONS:      DATA:  Recent Labs   Lab 08/04/23  1831 08/05/23  0408 08/06/23  0410 08/07/23  0448 08/08/23  0459 08/10/23  0550 08/11/23  0613    144 142 135* 131* 134* 138   K 5.0 4.3 4.4 4.5 4.2 4.3 4.0   * 116* 115* 114* 107 105 109   CO2 22* 20* 20* 18* 17* 18* 21*   BUN 37* 31* 39* 40* 40* 42* 32*   CREATININE 1.6* 1.2 1.1 0.9 0.8 1.1 0.9   * 106 276* 324* 290* 284* 80   CALCIUM 8.9 8.6* 8.6* 8.5* 8.7 8.6* 8.8   AST 34  --   --   --   --   --   --    ALT 44  --   --   --   --   --   --      Recent Labs   Lab 08/04/23  1831 08/05/23  0408 08/06/23  0410 08/07/23  0448 08/08/23  0459  "08/10/23  0551 08/11/23  0613   WBC 16.23* 12.72* 12.02 16.80* 13.98* 17.67* 17.41*   HGB 10.7* 10.0* 9.1* 9.6* 9.6* 9.7* 9.5*   HCT 30.7* 28.6* 25.5* 26.6* 26.8* 27.2* 26.7*    315 359 324 369 419 429     No results found for: "PROTEINCSF", "GLUCCSF", "WBCCSF", "RBCCSF", "PMNCSF"  Hemoglobin A1C   Date Value Ref Range Status   08/05/2023 6.4 (H) 4.5 - 6.2 % Final     Comment:     According to ADA guidelines, hemoglobin A1C <7.0% represents  optimal control in non-pregnant diabetic patients.  Different  metrics may apply to specific populations.   Standards of Medical Care in Diabetes - 2016.    For the purpose of screening for the presence of diabetes:  <5.7%     Consistent with the absence of diabetes  5.7-6.4%  Consistent with increasing risk for diabetes   (prediabetes)  >or=6.5%  Consistent with diabetes    Currently no consensus exists for use of hemoglobin A1C  for diagnosis of diabetes for children.     05/03/2023 10.2 (H) 4.5 - 6.2 % Final     Comment:     According to ADA guidelines, hemoglobin A1C <7.0% represents  optimal control in non-pregnant diabetic patients.  Different  metrics may apply to specific populations.   Standards of Medical Care in Diabetes - 2016.    For the purpose of screening for the presence of diabetes:  <5.7%     Consistent with the absence of diabetes  5.7-6.4%  Consistent with increasing risk for diabetes   (prediabetes)  >or=6.5%  Consistent with diabetes    Currently no consensus exists for use of hemoglobin A1C  for diagnosis of diabetes for children.     02/22/2023 8.9 (H) 4.0 - 5.6 % Final     Comment:     ADA Screening Guidelines:  5.7-6.4%  Consistent with prediabetes  >or=6.5%  Consistent with diabetes    High levels of fetal hemoglobin interfere with the HbA1C  assay. Heterozygous hemoglobin variants (HbS, HgC, etc)do  not significantly interfere with this assay.   However, presence of multiple variants may affect accuracy.              I have personally reviewed " and interpreted the pertinent imaging and lab results.  Imaging Results              US Carotid Bilateral (Final result)  Result time 08/04/23 20:42:04      Final result by Brooke Cottrell MD (08/04/23 20:42:04)                   Narrative:    PROCEDURE:    US CAROTID DOPPLER BILATERAL  dated  8/4/2023 7:16 PM CDT    CLINICAL HISTORY:   Female 63 years of age.    TECHNIQUE:  Multiple sagittal and transverse images of the carotid arterial system of the neck were obtained.  2-D vascular structure, Doppler spectral analysis and color flow Doppler imaging was performed.  All measurements and percent stenoses described below were determined using NASCET criteria or criteria similar to NASCET, as defined by the Society of Radiologist in Ultrasound Consensus Conference Radiology 2003.    PREVIOUS STUDIES:  None Available    FINDINGS:    Right carotid system:  There is no plaque. The peak systolic velocity in the right CCA is 96 cm/sec.  The peak systolic velocity within the internal carotid artery is 81 cm/sec.  The ICA/CCA ratio is calculated as 0.8.  The external carotid artery is patent with antegrade flow. Antegrade flow is present in the vertebral artery.    Left carotid system:  There is no plaque.  The peak systolic velocity in the left CCA is 110 cm/sec.  The peak systolic velocity within the internal carotid artery is 76 cm/sec.  The ICA/CCA ratio is calculated as 0.7.  The external carotid artery is patent with antegrade flow. Antegrade flow is present in the vertebral artery.    IMPRESSION:    Normal bilateral carotid ultrasound.    Electronically signed by:  Brooke Jade MD  8/4/2023 8:42 PM CDT Workstation: [id number]                                     X-Ray Chest AP Portable (Final result)  Result time 08/04/23 19:09:18      Final result by Rj Kellogg MD (08/04/23 19:09:18)                   Narrative:    History: Stroke.    FINDINGS: AP view of the chest is compared to previous study dated  July 29, 2023. No focal infiltrate or pleural effusion is seen. Cardiac silhouette is enlarged. Bony thorax appears intact.    IMPRESSION:  1. No acute cardiopulmonary change seen.    Electronically signed by:  Rj Kellogg MD  8/4/2023 7:09 PM CDT Workstation: [id number]                                     CTA Head and Neck (xpd) (Final result)  Result time 08/04/23 18:55:02      Final result by Rj Kellogg MD (08/04/23 18:55:02)                   Narrative:    CMS MANDATED QUALITY DATA - CT RADIATION - 436    All CT scans at this facility use dose modulation, iterative-reconstruction, and/or weight-based dosing when appropriate to reduce radiation dose to as low as reasonably achievable.    CMS MANDATED QUALITY [id number]    NASCET CRITERIA WAS UTILIZED FOR ESTIMATION OF STENOSIS SEVERITY WITH THE NARROWEST SEGMENT BEING COMPARED TO THE DISTAL LUMINAL DIAMETER.        HISTORY:Stroke/TIA. Determined embolic source.    TECHNIQUE: Serial axial images were obtained from aortic arch through the vertex with 100 cc of Omnipaque 350 intravenous contrast utilizing a CT angiography protocol. Coronal and sagittal MIP CT angiography reconstructions were performed. Patient received approximately 158 mGy-cm of radiation exposure from this exam.    COMPARISON: Comparison to previous study dated July 4, 2023.    FINDINGS:Aortic arch is unremarkable. Great vessel origins appear widely patent. Ectatic patent cervical carotid arteries are visualized. No evidence of significant cervical carotid artery stenosis is seen. Minimal atherosclerotic changes are present at the left cervical carotid bifurcation. Codominant patent vertebral arteries are visualized.    Internal carotid arteries appear widely patent at the skull base. Codominant vertebral arteries are present at the skull base with a widely patent basilar artery. Bilateral anterior, middle, and posterior cerebral arteries appear patent without evidence of  significant stenosis. Patent bilateral posterior communicating arteries are identified.    IMPRESSION:  1. No evidence of cervical carotid/vertebral artery occlusion or significant stenosis.  2. No evidence of large vessel intracranial arterial occlusion or significant stenosis is seen.          Electronically signed by:  Rj Kellogg MD  8/4/2023 6:55 PM CDT Workstation: [id number]                                     CT Head Without Contrast (Edited Result - FINAL)  Result time 08/04/23 19:36:33      Edited Result - FINAL by Fozia Le DO (08/04/23 19:36:33)                   Narrative:         ADDENDUM #1       This report contains critical findings that may be critical to patient care.    At 6:47 PM CST on 8/4/2023 , the report findings were confirmed with Dr. Torrez has received exam report, is aware of the finding(s), and indicated that a call was not necessary to discuss exam findings.        Electronically signed by:  Fozia Le DO  8/4/2023 7:36 PM CDT Workstation: [id number]     ORIGINAL REPORT       CT of the head: 8/4/2023 6:40 PM CDT    HISTORY: 63 years  old Female with Neuro deficit, acute, stroke suspected. Patient found unresponsive, altered mental status    COMPARISON: 7/3/2023    TECHNIQUE: Multiple axial contiguous images were obtained through the head without intravenous contrast administered. This exam was performed according to our departmental dose-optimization program, which includes automated exposure control, adjustment of the mA and/or KV according to the patient's size and/or use of iterative reconstruction technique.    FINDINGS: The ventricles and cerebral sulci demonstrate mild prominence, consistent with cerebral atrophy. There are moderate periventricular hypodensities, suggestive of periventricular white matter changes.    There are similar hypodensities at the left frontal lobe and left basal ganglia.    The gray-white matter differentiation is within normal  limits.    Both globes appear symmetric.    The mastoid air cells appear clear.    The visualized paranasal sinuses appear clear.    The calvarium is intact.    No extra-axial fluid collection is seen.    No midline shift or mass effect is apparent.    There are no findings to suggest acute intracranial hemorrhage.    IMPRESSION: 1. No acute intracranial hemorrhage is seen.  2. There is mild to moderate cerebral atrophy and periventricular white matter changes are seen.    Electronically signed by:  Fozia Le DO  8/4/2023 6:41 PM CDT Workstation: [id number]                      Final result by Fozia Le DO (08/04/23 18:41:20)                   Narrative:    CT of the head: 8/4/2023 6:40 PM CDT    HISTORY: 63 years  old Female with Neuro deficit, acute, stroke suspected. Patient found unresponsive, altered mental status    COMPARISON: 7/3/2023    TECHNIQUE: Multiple axial contiguous images were obtained through the head without intravenous contrast administered. This exam was performed according to our departmental dose-optimization program, which includes automated exposure control, adjustment of the mA and/or KV according to the patient's size and/or use of iterative reconstruction technique.    FINDINGS: The ventricles and cerebral sulci demonstrate mild prominence, consistent with cerebral atrophy. There are moderate periventricular hypodensities, suggestive of periventricular white matter changes.    There are similar hypodensities at the left frontal lobe and left basal ganglia.    The gray-white matter differentiation is within normal limits.    Both globes appear symmetric.    The mastoid air cells appear clear.    The visualized paranasal sinuses appear clear.    The calvarium is intact.    No extra-axial fluid collection is seen.    No midline shift or mass effect is apparent.    There are no findings to suggest acute intracranial hemorrhage.    IMPRESSION: 1. No acute intracranial hemorrhage is  seen.  2. There is mild to moderate cerebral atrophy and periventricular white matter changes are seen.    Electronically signed by:  Fozia Le   8/4/2023 6:41 PM CDT Workstation: [id number]                                            ASSESSMENT AND PLAN:          Encephalopathy  Prior stroke  Seizures  COVID infection     Plan:   Admitted to the hospital with encephalopathy and unable to talk for the past few days with worsening in the last 1 day prior to presentation.  Etiology is unknown however could be seizure with postictal state versus metabolic/infectious causes  CT head and CT angiogram head and neck done no significant acute abnormalities.  MRI brain showed tiny left parietal lobe infarct.  Punctate infarct in the right regina was also noted.  The small infarcts do not explain patient's mental status.  Stat EEG was done on admission showed no seizures or  Epileptiform activity.  Repeat EEG was done on 08/06 showed generalized periodic discharges with sharp waveform with seizure potential however no seizures were recorded.  2 mg Ativan was given during the study which cleared the background.  Additional loading dose of Keppra 2 g was given and maintenance dose of Keppra was increased to 1000 mg b.i.d..  Continuous long-term EEG monitoring for 23 hours showed no seizures however continues to show intermittent generalized periodic discharges and occasional epileptiform activities.  Aspirin, Lipitor for stroke prevention.  Will hold off on Plavix as patient does have microhemorrhages and dual antiplatelet therapy increases risk of intracranial hemorrhage.  Physical and occupational therapy evaluate and treat.  Neuro checks per unit protocol.  Workup/management for metabolic/infectious causes of encephalopathy per primary team.  Repeat EEG done on 08/09 showed no seizures or epileptiform activity.  Mental status improving.  Will follow         Geoffrey Roque MD  Neurology/vascular Neurology  Date of  Service: 08/11/2023  2:55 PM    Please note: This note was transcribed using voice recognition software. Because of this technology there are often uinintended grammatical, spelling, and other transcription errors. Please disregard these errors.

## 2023-08-11 NOTE — PT/OT/SLP PROGRESS
Occupational Therapy   Treatment    Name: Steff Johnson  MRN: [id number]  Admitting Diagnosis:  Acute encephalopathy       Recommendations:     Discharge Recommendations: nursing facility, skilled  Discharge Equipment Recommendations:   (TBD)  Barriers to discharge:  Decreased caregiver support    Assessment:     Steff Johnson is a 63 y.o. female with a medical diagnosis of Acute encephalopathy.  She presents with visual and mobility deficits. Patient participated in BUE/BLE therex, grooming bed level, bed mobility and unsupported sitting EOB. Performance deficits affecting function are weakness, impaired endurance, impaired self care skills, impaired sensation, impaired functional mobility, gait instability, impaired balance, decreased upper extremity function, decreased lower extremity function, decreased safety awareness.     Rehab Prognosis:  Fair; patient would benefit from acute skilled OT services to address these deficits and reach maximum level of function.       Plan:     Patient to be seen 3 x/week to address the above listed problems via self-care/home management, therapeutic activities, therapeutic exercises  Plan of Care Expires: 09/08/23  Plan of Care Reviewed with: patient    Subjective     Chief Complaint: None  Patient/Family Comments/goals: improved attention, visual tracking and functional mobility  Pain/Comfort:  Pain Rating 1: 0/10  Pain Rating Post-Intervention 1: 0/10    Objective:     Communicated with: nurse prior to session.  Patient found HOB elevated with telemetry, NG tube, PureWick upon OT entry to room.    General Precautions: Standard, airborne, contact, droplet, fall    Orthopedic Precautions:N/A  Braces: N/A  Respiratory Status: Room air     Occupational Performance:     Bed Mobility:    Patient completed Scooting/Bridging with total assistance  Patient completed Supine to Sit with total assistance  Patient completed Sit to Supine with total assistance   Performed unsupported  sitting EOB with minimal assistance.    BUE/BLE Therex:  Performed LUE therex x5 reps with moderate assistance bed level.  Performed LLE therex x5 reps with moderate assistance bed level.  Performed RUE therex x5 reps with maximal assistance bed level.  Performed RLE therex x5 reps with maximal assistance bed level.     Activities of Daily Living:  Grooming: maximal assistance to wash face bed level with HOB elevate 45 degrees.      [facility] 6 Click ADL: 8    Treatment & Education:  Patient able to follow 1 step commands and attend to ADL tasks for 5-20 seconds with minimal verbal/tactile cues.     Patient left HOB elevated with all lines intact, call button in reach, and bed alarm on    GOALS:   Multidisciplinary Problems       Occupational Therapy Goals          Problem: Occupational Therapy    Goal Priority Disciplines Outcome Interventions   Occupational Therapy Goal     OT, PT/OT     Description: Goals to be met by: 9/8/2023     Patient will increase functional independence with ADLs by performing:    Grooming while seated with Minimal Assistance.  Sitting at edge of bed x10 minutes with Contact Guard Assistance.  Rolling to Bilateral with Minimal Assistance.   Supine to sit with Minimal Assistance.  Upper extremity exercise program x10 reps per handout, with assistance as needed.  Attend to ADL task for 5 minutes with minimal verbal/tactile cues.  Localize left/right and across midline during therapy session with minimal verbal/tactile cues.                         Time Tracking:     OT Date of Treatment: 08/11/23  OT Start Time: 1057  OT Stop Time: 1120  OT Total Time (min): 23 min    Billable Minutes:Therapeutic Activity 11  Therapeutic Exercise 12    OT/LIYAH: OT          8/11/2023

## 2023-08-11 NOTE — PLAN OF CARE
Noted order for home health. Referral sent via [facility] to [facility], [facility], [facility], [facility], [facility], [facility], and [facility]. Awaiting response on acceptance. Will continue to follow and update as appropriate.        08/11/23 1131   Post-Acute Status   Post-Acute Authorization Home Health   Home Health Status Referrals Sent   Coverage [employer] [facility]   Discharge Delays (!) Post-Acute Set-up   Discharge Plan   Discharge Plan A Home Health   Discharge Plan B Home Health

## 2023-08-11 NOTE — PLAN OF CARE
Problem: Infection  Goal: Absence of Infection Signs and Symptoms  Outcome: Met     Problem: Adult Inpatient Plan of Care  Goal: Plan of Care Review  Outcome: Met  Goal: Patient-Specific Goal (Individualized)  Outcome: Met  Goal: Absence of Hospital-Acquired Illness or Injury  Outcome: Met  Goal: Optimal Comfort and Wellbeing  Outcome: Met  Goal: Readiness for Transition of Care  Outcome: Met     Problem: Diabetes Comorbidity  Goal: Blood Glucose Level Within Targeted Range  Outcome: Met     Problem: Skin Injury Risk Increased  Goal: Skin Health and Integrity  Outcome: Met     Problem: Fall Injury Risk  Goal: Absence of Fall and Fall-Related Injury  Outcome: Met     Problem: Oral Intake Inadequate  Goal: Improved Oral Intake  Outcome: Met

## 2023-08-11 NOTE — PROGRESS NOTES
[facility]  Adult Nutrition   Progress Note (Follow-Up)    SUMMARY     Recommendations  Recommendation/Intervention: 1. RD added Ensure Plus HP Milkshake TID (to provide 1770 kcal/day and 72 g/day protein). 2. Continue diet per SLP recommendations.  Goals: 1. Patient to tolerate diet. 2. Patient to meet at least 75% of estimated energy and protein needs.  Nutrition Goal Status: new  Communication of RD Recs: reviewed with physician (reviewed with RN)    Dietitian Rounds Brief  RD unable interview pt d/t in isolation, COVID-19. RD spoke with RN and MD. Pt started on diet per SLP recommendations. Mostly pleasure feeds. The most patient has has is a few bites of mashed potatoes and gravy since diet started. NG tube in place, but tube feeding has been discontinued. Plans for patient to discharge home today with home health. RD added ONS to better meet needs. If oral intake fails to improve, RD believes patient should continue temporary NG tube feeding, vs PEG placement.     Reason for Assessment  Reason For Assessment: RD follow-up  Diagnosis: other (see comments) (AMS, COVID-19)  Relevant Medical History: Diabetes mellitus    Hypertension (I10)    COPD (chronic obstructive pulmonary disease) (J44.9)    Arthritis (M19.90)    Wears glasses (Z97.3)    Stroke (I63.9)    Complete tear of left rotator cuff (M75.122) 11/09/2017   H/o Cerebrovascular accident (CVA) of left pontine structure (I63.9) 12/12/2019   COVID-19 infection (U07.1) 07/02/2021   Personal history of colonic polyps (Z86.010)  Interdisciplinary Rounds: did not attend  Nutrition Discharge Planning: Dysphaga Pureed Diet (IDDSI level 4); Oral nutrition supplement (Ensure Plus High Protein TID) to better meet needs    Nutrition Risk Screen  Nutrition Risk Screen: no indicators present     MST Score: 0  Have you recently lost weight without trying?: No  Weight loss score: 0  Have you been eating poorly because of a decreased appetite?: No  Appetite  "score: 0       Nutrition/Diet History  Spiritual, Cultural Beliefs, Faith Practices, Values that Affect Care: no  Factors Affecting Nutritional Intake: decreased appetite, difficulty/impaired swallowing    Anthropometrics  Temp: 98.2 °F (36.8 °C)  Height Method: Stated  Height: 4' 11" (149.9 cm)  Height (inches): 59 in  Weight Method: Bed Scale  Weight: 58 kg (127 lb 13.9 oz)  Weight (lb): 127.87 lb  Ideal Body Weight (IBW), Female: 95 lb  % Ideal Body Weight, Female (lb): 127.63 %  BMI (Calculated): 25.8  BMI Grade: 18.5-24.9 - normal       Weight History:  Wt Readings from Last 10 Encounters:   08/11/23 58 kg (127 lb 13.9 oz)   07/03/23 59 kg (130 lb)   06/27/23 50.4 kg (111 lb)   06/16/23 59.9 kg (132 lb 0.9 oz)   05/18/23 64.2 kg (141 lb 8.6 oz)   05/04/23 62.2 kg (137 lb 2 oz)   04/27/23 60.7 kg (133 lb 12.8 oz)   04/28/23 60.3 kg (133 lb)   04/25/23 62.6 kg (138 lb)   04/18/23 62.6 kg (138 lb)       Lab/Procedures/Meds: Pertinent Labs Reviewed    Clinical Chemistry:  Recent Labs   Lab 08/04/23  1831 08/05/23  0408 08/11/23  0613      < > 138   K 5.0   < > 4.0   *   < > 109   CO2 22*   < > 21*   *   < > 80   BUN 37*   < > 32*   CREATININE 1.6*   < > 0.9   CALCIUM 8.9   < > 8.8   PROT 7.8  --   --    ALBUMIN 3.6  --   --    BILITOT 0.3  --   --    ALKPHOS 115  --   --    AST 34  --   --    ALT 44  --   --    ANIONGAP 5*   < > 8    < > = values in this interval not displayed.       CBC:   Recent Labs   Lab 08/11/23  0613   WBC 17.41*   RBC 3.40*   HGB 9.5*   HCT 26.7*      MCV 79*   MCH 27.9   MCHC 35.6       Lipid Panel:  Recent Labs   Lab 08/04/23  1831   CHOL 92*   HDL 38*   LDLCALC 41.6*   TRIG 62   CHOLHDL 41.3       Diabetes:  Recent Labs   Lab 08/05/23  0408   HGBA1C 6.4*       Thyroid & Parathyroid:  Recent Labs   Lab 08/04/23  1831   TSH 0.770       Medications: Pertinent Medications reviewed    Scheduled Meds:   amLODIPine  10 mg Oral Daily    aspirin  81 mg Oral Daily    " atorvastatin  80 mg Oral QHS    dantrolene  50 mg Oral TID    enoxparin  40 mg Subcutaneous Q24H (prophylaxis, 1700)    EScitalopram oxalate  10 mg Oral QHS    famotidine  20 mg Per NG tube Daily    insulin detemir U-100 (Levemir)  20 Units Subcutaneous QHS    levETIRAcetam (Keppra) IV (PEDS and ADULTS)  1,000 mg Intravenous Q12H    metoprolol tartrate  100 mg Per G Tube BID    modafiniL  200 mg Oral Daily       PRN Meds:.acetaminophen, dextrose 50%, glucagon (human recombinant), hydrALAZINE, insulin aspart U-100, melatonin, sodium chloride 0.9%    Estimated/Assessed Needs  Weight Used For Calorie Calculations: 55.7 kg (122 lb 12.7 oz)  Energy Calorie Requirements (kcal): 0050-0590 kcals (30-35 kcal/kg) (Normal BMI, acute care)  Energy Need Method: Kcal/kg  Protein Requirements: 67-84g (1.2-1.5g/kg)  Weight Used For Protein Calculations: 55.7 kg (122 lb 12.7 oz)     Estimated Fluid Requirement Method: RDA Method  RDA Method (mL): 1671       Nutrition Prescription Ordered  Current Diet Order: Dysphagia Pureed (IDDSI Level 4)    Evaluation of Received Nutrient/Fluid Intake  Energy Calories Required: not meeting needs  Protein Required: not meeting needs  Fluid Required: not meeting needs  Comments: -  Tolerance: tolerating     Intake/Output Summary (Last 24 hours) at 8/11/2023 1248  Last data filed at 8/11/2023 0837  Gross per 24 hour   Intake 480 ml   Output 900 ml   Net -420 ml      % Intake of Estimated Energy Needs: 0 - 25 %  % Meal Intake: 0 - 25 %    Nutrition Risk  Level of Risk/Frequency of Follow-up: high     Monitor and Evaluation  Food and Nutrient Intake: energy intake, food and beverage intake  Food and Nutrient Adminstration: diet order  Knowledge/Beliefs/Attitudes: food and nutrition knowledge/skill  Physical Activity and Function: nutrition-related ADLs and IADLs, factors affecting access to physical activity  Anthropometric Measurements: weight, weight change, body mass index  Biochemical Data,  Medical Tests and Procedures: electrolyte and renal panel, inflammatory profile, gastrointestinal profile, lipid profile, glucose/endocrine profile  Nutrition-Focused Physical Findings: overall appearance     Nutrition Follow-Up  RD Follow-up?: Yes    Renetta Sequeira RD 08/11/2023 12:47 PM

## 2023-08-12 PROBLEM — N39.0 UTI (URINARY TRACT INFECTION): Status: ACTIVE | Noted: 2023-08-12

## 2023-08-12 LAB
ALBUMIN SERPL BCP-MCNC: 3.1 G/DL (ref 3.5–5.2)
ALP SERPL-CCNC: 82 U/L (ref 55–135)
ALT SERPL W/O P-5'-P-CCNC: 31 U/L (ref 10–44)
ANION GAP SERPL CALC-SCNC: 7 MMOL/L (ref 8–16)
AST SERPL-CCNC: 15 U/L (ref 10–40)
BACTERIA #/AREA URNS HPF: NEGATIVE /HPF
BASOPHILS # BLD AUTO: 0.05 K/UL (ref 0–0.2)
BASOPHILS NFR BLD: 0.4 % (ref 0–1.9)
BILIRUB SERPL-MCNC: 0.6 MG/DL (ref 0.1–1)
BILIRUB UR QL STRIP: NEGATIVE
BNP SERPL-MCNC: 17 PG/ML (ref 0–99)
BUN SERPL-MCNC: 23 MG/DL (ref 8–23)
CALCIUM SERPL-MCNC: 8.6 MG/DL (ref 8.7–10.5)
CHLORIDE SERPL-SCNC: 106 MMOL/L (ref 95–110)
CLARITY UR: CLEAR
CO2 SERPL-SCNC: 23 MMOL/L (ref 23–29)
COLOR UR: YELLOW
CREAT SERPL-MCNC: 0.8 MG/DL (ref 0.5–1.4)
DIFFERENTIAL METHOD: ABNORMAL
EOSINOPHIL # BLD AUTO: 0.1 K/UL (ref 0–0.5)
EOSINOPHIL NFR BLD: 1.1 % (ref 0–8)
ERYTHROCYTE [DISTWIDTH] IN BLOOD BY AUTOMATED COUNT: 15 % (ref 11.5–14.5)
EST. GFR  (NO RACE VARIABLE): >60 ML/MIN/1.73 M^2
GLUCOSE SERPL-MCNC: 100 MG/DL (ref 70–110)
GLUCOSE SERPL-MCNC: 200 MG/DL (ref 70–110)
GLUCOSE UR QL STRIP: NEGATIVE
HCT VFR BLD AUTO: 27 % (ref 37–48.5)
HGB BLD-MCNC: 9.7 G/DL (ref 12–16)
HGB UR QL STRIP: NEGATIVE
HYALINE CASTS #/AREA URNS LPF: 6 /LPF
IMM GRANULOCYTES # BLD AUTO: 0.29 K/UL (ref 0–0.04)
IMM GRANULOCYTES NFR BLD AUTO: 2.4 % (ref 0–0.5)
KETONES UR QL STRIP: NEGATIVE
LEUKOCYTE ESTERASE UR QL STRIP: ABNORMAL
LYMPHOCYTES # BLD AUTO: 2.9 K/UL (ref 1–4.8)
LYMPHOCYTES NFR BLD: 24 % (ref 18–48)
MCH RBC QN AUTO: 28.4 PG (ref 27–31)
MCHC RBC AUTO-ENTMCNC: 35.9 G/DL (ref 32–36)
MCV RBC AUTO: 79 FL (ref 82–98)
MICROSCOPIC COMMENT: ABNORMAL
MONOCYTES # BLD AUTO: 1.7 K/UL (ref 0.3–1)
MONOCYTES NFR BLD: 13.7 % (ref 4–15)
NEUTROPHILS # BLD AUTO: 7.1 K/UL (ref 1.8–7.7)
NEUTROPHILS NFR BLD: 58.4 % (ref 38–73)
NITRITE UR QL STRIP: NEGATIVE
NRBC BLD-RTO: 0 /100 WBC
PH UR STRIP: 6 [PH] (ref 5–8)
PLATELET # BLD AUTO: 386 K/UL (ref 150–450)
PMV BLD AUTO: 10.5 FL (ref 9.2–12.9)
POTASSIUM SERPL-SCNC: 3.7 MMOL/L (ref 3.5–5.1)
PROT SERPL-MCNC: 6.5 G/DL (ref 6–8.4)
PROT UR QL STRIP: ABNORMAL
RBC # BLD AUTO: 3.41 M/UL (ref 4–5.4)
RBC #/AREA URNS HPF: 2 /HPF (ref 0–4)
SODIUM SERPL-SCNC: 136 MMOL/L (ref 136–145)
SP GR UR STRIP: 1.01 (ref 1–1.03)
SQUAMOUS #/AREA URNS HPF: 3 /HPF
URN SPEC COLLECT METH UR: ABNORMAL
UROBILINOGEN UR STRIP-ACNC: NEGATIVE EU/DL
WBC # BLD AUTO: 12.23 K/UL (ref 3.9–12.7)
WBC #/AREA URNS HPF: 9 /HPF (ref 0–5)

## 2023-08-12 PROCEDURE — 25000003 PHARM REV CODE 250: Performed by: INTERNAL MEDICINE

## 2023-08-12 PROCEDURE — 83880 ASSAY OF NATRIURETIC PEPTIDE: CPT | Performed by: STUDENT IN AN ORGANIZED HEALTH CARE EDUCATION/TRAINING PROGRAM

## 2023-08-12 PROCEDURE — 80053 COMPREHEN METABOLIC PANEL: CPT | Performed by: STUDENT IN AN ORGANIZED HEALTH CARE EDUCATION/TRAINING PROGRAM

## 2023-08-12 PROCEDURE — 81001 URINALYSIS AUTO W/SCOPE: CPT | Performed by: STUDENT IN AN ORGANIZED HEALTH CARE EDUCATION/TRAINING PROGRAM

## 2023-08-12 PROCEDURE — 51701 INSERT BLADDER CATHETER: CPT | Mod: XB

## 2023-08-12 PROCEDURE — 93005 ELECTROCARDIOGRAM TRACING: CPT | Mod: 59 | Performed by: INTERNAL MEDICINE

## 2023-08-12 PROCEDURE — 99900035 HC TECH TIME PER 15 MIN (STAT)

## 2023-08-12 PROCEDURE — 94640 AIRWAY INHALATION TREATMENT: CPT

## 2023-08-12 PROCEDURE — 63600175 PHARM REV CODE 636 W HCPCS: Performed by: INTERNAL MEDICINE

## 2023-08-12 PROCEDURE — 99900031 HC PATIENT EDUCATION (STAT)

## 2023-08-12 PROCEDURE — G0378 HOSPITAL OBSERVATION PER HR: HCPCS

## 2023-08-12 PROCEDURE — 25000003 PHARM REV CODE 250: Performed by: STUDENT IN AN ORGANIZED HEALTH CARE EDUCATION/TRAINING PROGRAM

## 2023-08-12 PROCEDURE — 96375 TX/PRO/DX INJ NEW DRUG ADDON: CPT | Mod: 59

## 2023-08-12 PROCEDURE — 93010 EKG 12-LEAD: ICD-10-PCS | Mod: ,,, | Performed by: INTERNAL MEDICINE

## 2023-08-12 PROCEDURE — 51702 INSERT TEMP BLADDER CATH: CPT

## 2023-08-12 PROCEDURE — 94761 N-INVAS EAR/PLS OXIMETRY MLT: CPT

## 2023-08-12 PROCEDURE — 93010 ELECTROCARDIOGRAM REPORT: CPT | Mod: ,,, | Performed by: INTERNAL MEDICINE

## 2023-08-12 PROCEDURE — 63600175 PHARM REV CODE 636 W HCPCS: Performed by: STUDENT IN AN ORGANIZED HEALTH CARE EDUCATION/TRAINING PROGRAM

## 2023-08-12 PROCEDURE — 96372 THER/PROPH/DIAG INJ SC/IM: CPT | Mod: 59 | Performed by: INTERNAL MEDICINE

## 2023-08-12 PROCEDURE — 85025 COMPLETE CBC W/AUTO DIFF WBC: CPT | Performed by: STUDENT IN AN ORGANIZED HEALTH CARE EDUCATION/TRAINING PROGRAM

## 2023-08-12 PROCEDURE — 25000242 PHARM REV CODE 250 ALT 637 W/ HCPCS: Performed by: INTERNAL MEDICINE

## 2023-08-12 RX ORDER — LATANOPROST 50 UG/ML
1 SOLUTION/ DROPS OPHTHALMIC DAILY
Status: DISCONTINUED | OUTPATIENT
Start: 2023-08-12 | End: 2023-08-16 | Stop reason: HOSPADM

## 2023-08-12 RX ORDER — ENOXAPARIN SODIUM 100 MG/ML
30 INJECTION SUBCUTANEOUS EVERY 24 HOURS
Status: DISCONTINUED | OUTPATIENT
Start: 2023-08-12 | End: 2023-08-12

## 2023-08-12 RX ORDER — METOPROLOL SUCCINATE 50 MG/1
50 TABLET, EXTENDED RELEASE ORAL DAILY
Status: DISCONTINUED | OUTPATIENT
Start: 2023-08-12 | End: 2023-08-16 | Stop reason: HOSPADM

## 2023-08-12 RX ORDER — IBUPROFEN 200 MG
16 TABLET ORAL
Status: DISCONTINUED | OUTPATIENT
Start: 2023-08-12 | End: 2023-08-16 | Stop reason: HOSPADM

## 2023-08-12 RX ORDER — IPRATROPIUM BROMIDE 0.5 MG/2.5ML
0.5 SOLUTION RESPIRATORY (INHALATION) EVERY 6 HOURS
Status: DISCONTINUED | OUTPATIENT
Start: 2023-08-12 | End: 2023-08-12

## 2023-08-12 RX ORDER — ACETAMINOPHEN 325 MG/1
650 TABLET ORAL EVERY 6 HOURS PRN
Status: DISCONTINUED | OUTPATIENT
Start: 2023-08-12 | End: 2023-08-16 | Stop reason: HOSPADM

## 2023-08-12 RX ORDER — LEVETIRACETAM 500 MG/1
1000 TABLET ORAL 2 TIMES DAILY
Status: DISCONTINUED | OUTPATIENT
Start: 2023-08-12 | End: 2023-08-13

## 2023-08-12 RX ORDER — AMLODIPINE BESYLATE 5 MG/1
10 TABLET ORAL DAILY
Status: DISCONTINUED | OUTPATIENT
Start: 2023-08-12 | End: 2023-08-16 | Stop reason: HOSPADM

## 2023-08-12 RX ORDER — IPRATROPIUM BROMIDE 0.5 MG/2.5ML
0.5 SOLUTION RESPIRATORY (INHALATION) EVERY 6 HOURS PRN
Status: DISCONTINUED | OUTPATIENT
Start: 2023-08-12 | End: 2023-08-16

## 2023-08-12 RX ORDER — ENOXAPARIN SODIUM 100 MG/ML
40 INJECTION SUBCUTANEOUS EVERY 24 HOURS
Status: DISCONTINUED | OUTPATIENT
Start: 2023-08-12 | End: 2023-08-16 | Stop reason: HOSPADM

## 2023-08-12 RX ORDER — ONDANSETRON 2 MG/ML
4 INJECTION INTRAMUSCULAR; INTRAVENOUS EVERY 6 HOURS PRN
Status: DISCONTINUED | OUTPATIENT
Start: 2023-08-12 | End: 2023-08-16 | Stop reason: HOSPADM

## 2023-08-12 RX ORDER — NALOXONE HCL 0.4 MG/ML
0.02 VIAL (ML) INJECTION
Status: DISCONTINUED | OUTPATIENT
Start: 2023-08-12 | End: 2023-08-16 | Stop reason: HOSPADM

## 2023-08-12 RX ORDER — IBUPROFEN 200 MG
24 TABLET ORAL
Status: DISCONTINUED | OUTPATIENT
Start: 2023-08-12 | End: 2023-08-16 | Stop reason: HOSPADM

## 2023-08-12 RX ORDER — ATORVASTATIN CALCIUM 40 MG/1
80 TABLET, FILM COATED ORAL NIGHTLY
Status: DISCONTINUED | OUTPATIENT
Start: 2023-08-12 | End: 2023-08-16 | Stop reason: HOSPADM

## 2023-08-12 RX ORDER — SODIUM CHLORIDE 0.9 % (FLUSH) 0.9 %
10 SYRINGE (ML) INJECTION EVERY 12 HOURS PRN
Status: DISCONTINUED | OUTPATIENT
Start: 2023-08-12 | End: 2023-08-16 | Stop reason: HOSPADM

## 2023-08-12 RX ORDER — NAPROXEN SODIUM 220 MG/1
81 TABLET, FILM COATED ORAL DAILY
Status: DISCONTINUED | OUTPATIENT
Start: 2023-08-12 | End: 2023-08-16 | Stop reason: HOSPADM

## 2023-08-12 RX ORDER — ENALAPRILAT 1.25 MG/ML
1.25 INJECTION INTRAVENOUS
Status: COMPLETED | OUTPATIENT
Start: 2023-08-12 | End: 2023-08-12

## 2023-08-12 RX ORDER — DANTROLENE SODIUM 25 MG/1
50 CAPSULE ORAL 3 TIMES DAILY
Status: DISCONTINUED | OUTPATIENT
Start: 2023-08-12 | End: 2023-08-15

## 2023-08-12 RX ORDER — CEFUROXIME AXETIL 250 MG/1
500 TABLET ORAL EVERY 12 HOURS
Status: DISCONTINUED | OUTPATIENT
Start: 2023-08-12 | End: 2023-08-13

## 2023-08-12 RX ORDER — ESCITALOPRAM OXALATE 10 MG/1
10 TABLET ORAL NIGHTLY
Status: DISCONTINUED | OUTPATIENT
Start: 2023-08-12 | End: 2023-08-16 | Stop reason: HOSPADM

## 2023-08-12 RX ORDER — CLONIDINE HYDROCHLORIDE 0.1 MG/1
0.1 TABLET ORAL 3 TIMES DAILY
Status: DISCONTINUED | OUTPATIENT
Start: 2023-08-12 | End: 2023-08-16 | Stop reason: HOSPADM

## 2023-08-12 RX ORDER — HYDRALAZINE HYDROCHLORIDE 20 MG/ML
10 INJECTION INTRAMUSCULAR; INTRAVENOUS
Status: COMPLETED | OUTPATIENT
Start: 2023-08-12 | End: 2023-08-12

## 2023-08-12 RX ORDER — LOSARTAN POTASSIUM 50 MG/1
100 TABLET ORAL DAILY
Status: DISCONTINUED | OUTPATIENT
Start: 2023-08-12 | End: 2023-08-16 | Stop reason: HOSPADM

## 2023-08-12 RX ORDER — GLUCAGON 1 MG
1 KIT INJECTION
Status: DISCONTINUED | OUTPATIENT
Start: 2023-08-12 | End: 2023-08-16 | Stop reason: HOSPADM

## 2023-08-12 RX ADMIN — CLONIDINE HYDROCHLORIDE 0.1 MG: 0.1 TABLET ORAL at 09:08

## 2023-08-12 RX ADMIN — METOPROLOL SUCCINATE 50 MG: 50 TABLET, FILM COATED, EXTENDED RELEASE ORAL at 10:08

## 2023-08-12 RX ADMIN — CEFUROXIME AXETIL 500 MG: 250 TABLET, FILM COATED ORAL at 09:08

## 2023-08-12 RX ADMIN — IPRATROPIUM BROMIDE 0.5 MG: 0.5 SOLUTION RESPIRATORY (INHALATION) at 07:08

## 2023-08-12 RX ADMIN — ESCITALOPRAM OXALATE 10 MG: 10 TABLET ORAL at 09:08

## 2023-08-12 RX ADMIN — CLONIDINE HYDROCHLORIDE 0.1 MG: 0.1 TABLET ORAL at 03:08

## 2023-08-12 RX ADMIN — LATANOPROST 1 DROP: 50 SOLUTION OPHTHALMIC at 10:08

## 2023-08-12 RX ADMIN — DANTROLENE SODIUM 50 MG: 25 CAPSULE ORAL at 09:08

## 2023-08-12 RX ADMIN — INSULIN DETEMIR 13 UNITS: 100 INJECTION, SOLUTION SUBCUTANEOUS at 09:08

## 2023-08-12 RX ADMIN — ENALAPRILAT 1.25 MG: 1.25 INJECTION INTRAVENOUS at 01:08

## 2023-08-12 RX ADMIN — CLONIDINE HYDROCHLORIDE 0.1 MG: 0.1 TABLET ORAL at 10:08

## 2023-08-12 RX ADMIN — CEFUROXIME AXETIL 500 MG: 250 TABLET, FILM COATED ORAL at 10:08

## 2023-08-12 RX ADMIN — AMLODIPINE BESYLATE 10 MG: 5 TABLET ORAL at 10:08

## 2023-08-12 RX ADMIN — DANTROLENE SODIUM 50 MG: 25 CAPSULE ORAL at 10:08

## 2023-08-12 RX ADMIN — LEVETIRACETAM 1000 MG: 500 TABLET, FILM COATED ORAL at 10:08

## 2023-08-12 RX ADMIN — ACETAMINOPHEN 650 MG: 325 TABLET ORAL at 10:08

## 2023-08-12 RX ADMIN — ATORVASTATIN CALCIUM 80 MG: 40 TABLET, FILM COATED ORAL at 09:08

## 2023-08-12 RX ADMIN — ASPIRIN 81 MG 81 MG: 81 TABLET ORAL at 10:08

## 2023-08-12 RX ADMIN — LEVETIRACETAM 1000 MG: 500 TABLET, FILM COATED ORAL at 09:08

## 2023-08-12 RX ADMIN — DANTROLENE SODIUM 50 MG: 25 CAPSULE ORAL at 03:08

## 2023-08-12 RX ADMIN — LOSARTAN POTASSIUM 100 MG: 50 TABLET, FILM COATED ORAL at 10:08

## 2023-08-12 RX ADMIN — ENOXAPARIN SODIUM 40 MG: 40 INJECTION SUBCUTANEOUS at 05:08

## 2023-08-12 RX ADMIN — HYDRALAZINE HYDROCHLORIDE 10 MG: 20 INJECTION INTRAMUSCULAR; INTRAVENOUS at 01:08
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
## 2023-08-12 NOTE — PROGRESS NOTES
Automatic Inhaler to Nebulizer Interchange    Tiotropium (Spiriva Respimat) 5 mcg changed to Ipratropium 0.5 mg every 6 hours per [facility] Automatic Therapeutic Substitutions Protocol.    Please contact pharmacy at extension [phone] with any questions.     Thank you,   Polly Quintero

## 2023-08-12 NOTE — PLAN OF CARE
08/12/23 1242   POTTER Message   Medicare Outpatient and Observation Notification regarding financial responsibility Given to patient/caregiver;Explained to patient/caregiver;Signed/date by patient/caregiver   Date POTTER was signed 08/12/23   Time POTTER was signed 105

## 2023-08-12 NOTE — PROGRESS NOTES
Pharmacist Renal Dose Adjustment Note    Steff Johnson is a 63 y.o. female being treated with Enoxaparin.    Patient Data:    Vital Signs (Most Recent):  Temp: 98.3 °F (36.8 °C) (08/11/23 2336)  Pulse: 61 (08/12/23 0400)  Resp: 14 (08/12/23 0400)  BP: (!) 160/74 (08/12/23 0400)  SpO2: 96 % (08/12/23 0400) Vital Signs (72h Range):  Temp:  [97.5 °F (36.4 °C)-98.6 °F (37 °C)]   Pulse:  [51-73]   Resp:  [10-23]   BP: (130-214)/()   SpO2:  [95 %-100 %]      Recent Labs   Lab 08/10/23  0550 08/11/23  0613 08/12/23  0126   CREATININE 1.1 0.9 0.8     Serum creatinine: 0.8 mg/dL 08/12/23 0126  Estimated creatinine clearance: 54 mL/min  Body mass index is 25.65 kg/m².    Enoxaparin 30 mg once daily will be changed to Enoxaparin 40 mg once daily per pharmacy protocol.    Pharmacist's Name: Polly Quintero  Pharmacist's Extension: [phone]

## 2023-08-12 NOTE — CARE UPDATE
08/12/23 0737   Patient Assessment/Suction   Level of Consciousness (AVPU) alert   Respiratory Effort Normal;Unlabored   Expansion/Accessory Muscles/Retractions no use of accessory muscles;no retractions;expansion symmetric   All Lung Fields Breath Sounds clear;diminished   Rhythm/Pattern, Respiratory unlabored;pattern regular;depth regular;chest wiggle adequate;no shortness of breath reported   Cough Frequency no cough   PRE-TX-O2   Device (Oxygen Therapy) room air   SpO2 97 %   Pulse Oximetry Type Intermittent   $ Pulse Oximetry - Multiple Charge Pulse Oximetry - Multiple   Pulse 70   Resp 18   Aerosol Therapy   $ Aerosol Therapy Charges Aerosol Treatment   Daily Review of Necessity (SVN) completed   Respiratory Treatment Status (SVN) given   Treatment Route (SVN) oxygen;mask   Patient Position (SVN) HOB elevated   Post Treatment Assessment (SVN) increased aeration   Signs of Intolerance (SVN) none   Breath Sounds Post-Respiratory Treatment   Throughout All Fields Post-Treatment All Fields   Throughout All Fields Post-Treatment aeration increased   Post-treatment Heart Rate (beats/min) 71   Post-treatment Resp Rate (breaths/min) 18   Education   $ Education Bronchodilator;15 min   Respiratory Evaluation   $ Care Plan Tech Time 15 min

## 2023-08-12 NOTE — SUBJECTIVE & OBJECTIVE
Past Medical History:   Diagnosis Date    Arthritis     Complete tear of left rotator cuff 11/09/2017    COPD (chronic obstructive pulmonary disease)     COVID-19 infection 07/02/2021    Diabetes mellitus     H/o Cerebrovascular accident (CVA) of left pontine structure 12/12/2019    Hypertension     Personal history of colonic polyps     Stroke     Wears glasses        Past Surgical History:   Procedure Laterality Date    COLONOSCOPY N/A 4/11/2017    Procedure: COLONOSCOPY;  Surgeon: Jared Antonio MD;  Location: [facility];  Service: Endoscopy;  Laterality: N/A;    HYSTERECTOMY      OOPHORECTOMY      SHOULDER SURGERY      R    TRANSESOPHAGEAL ECHOCARDIOGRAM WITH POSSIBLE CARDIOVERSION (GT W/ POSS CARDIOVERSION) N/A 5/4/2023    Procedure: Transesophageal echo (GT) intra-procedure log documentation;  Surgeon: Blade Phillip MD;  Location: [facility] CATH/EP LAB;  Service: Cardiology;  Laterality: N/A;       Review of patient's allergies indicates:  No Known Allergies    Current Facility-Administered Medications on File Prior to Encounter   Medication    [DISCONTINUED] acetaminophen suppository 650 mg    [DISCONTINUED] amLODIPine tablet 10 mg    [DISCONTINUED] aspirin chewable tablet 81 mg    [DISCONTINUED] atorvastatin tablet 80 mg    [DISCONTINUED] dantrolene capsule 50 mg    [DISCONTINUED] dextrose 50% injection 12.5 g    [DISCONTINUED] enoxaparin injection 40 mg    [DISCONTINUED] EScitalopram oxalate tablet 10 mg    [DISCONTINUED] famotidine tablet 20 mg    [DISCONTINUED] GENERIC EXTERNAL MEDICATION    [DISCONTINUED] GENERIC EXTERNAL MEDICATION    [DISCONTINUED] glucagon (human recombinant) injection 1 mg    [DISCONTINUED] hydrALAZINE injection 10 mg    [DISCONTINUED] insulin aspart U-100 pen 0-5 Units    [DISCONTINUED] insulin detemir U-100 (Levemir) pen 20 Units    [DISCONTINUED] levETIRAcetam in NaCl (iso-os) IVPB 1,000 mg    [DISCONTINUED] melatonin tablet 6 mg    [DISCONTINUED] metoprolol tartrate  (LOPRESSOR) tablet 100 mg    [DISCONTINUED] modafiniL tablet 200 mg    [DISCONTINUED] sodium chloride 0.9% flush 10 mL     Current Outpatient Medications on File Prior to Encounter   Medication Sig    amLODIPine (NORVASC) 10 MG tablet Take 1 tablet (10 mg total) by mouth once daily.    aspirin 81 MG Chew Take 1 tablet (81 mg total) by mouth once daily.    atorvastatin (LIPITOR) 80 MG tablet Take 1 tablet (80 mg total) by mouth every evening.    cloNIDine (CATAPRES) 0.1 MG tablet Take 1 tablet (0.1 mg total) by mouth 3 (three) times daily.    dantrolene (DANTRIUM) 50 MG Cap Take 50 mg by mouth 3 (three) times daily.    EScitalopram oxalate (LEXAPRO) 10 MG tablet Take 10 mg by mouth every evening.    insulin aspart U-100 (NOVOLOG) 100 unit/mL (3 mL) InPn pen Inject 5 Units into the skin 3 (three) times daily.    insulin detemir U-100, Levemir, 100 unit/mL (3 mL) SubQ InPn pen Inject 20 Units into the skin every evening.    insulin glargine (LANTUS) 100 unit/mL injection Inject 13 Units into the skin every evening.    insulin lispro 100 unit/mL injection Inject 5 Units into the skin 3 (three) times daily.    latanoprost 0.005 % ophthalmic solution Place 1 drop into both eyes once daily.    levETIRAcetam (KEPPRA) 1000 MG tablet Take 1 tablet (1,000 mg total) by mouth 2 (two) times daily.    losartan (COZAAR) 100 MG tablet Take 1 tablet (100 mg total) by mouth once daily.    metoprolol succinate (TOPROL-XL) 100 MG 24 hr tablet Take 1 tablet (100 mg total) by mouth once daily. (Patient taking differently: Take 50 mg by mouth once daily.)    minoxidiL (LONITEN) 10 MG Tab Take 10 mg by mouth 2 (two) times daily.    modafiniL (PROVIGIL) 200 MG Tab Take 200 mg by mouth once daily.    tiotropium (SPIRIVA) 18 mcg inhalation capsule Inhale 1 capsule (18 mcg total) into the lungs once daily. Controller    umeclidinium (INCRUSE ELLIPTA) 62.5 mcg/actuation inhalation capsule Inhale 62.5 mcg into the lungs once daily.     [DISCONTINUED] blood-glucose meter (TRUE METRIX GLUCOSE METER) kit Use as instructed    [DISCONTINUED] lancing device (TRUEDRAW LANCING DEVICE) Misc Inject 1 Device into the skin 2 (two) times daily.     Family History       Problem Relation (Age of Onset)    Anemia Daughter    Asthma Mother    Diabetes Mother, Father, Brother    Hypertension Mother, Brother          Tobacco Use    Smoking status: Never    Smokeless tobacco: Never   Substance and Sexual Activity    Alcohol use: No    Drug use: No    Sexual activity: Not Currently     Review of Systems   Unable to perform ROS: Acuity of condition     Objective:     Vital Signs (Most Recent):  Temp: 98.3 °F (36.8 °C) (08/11/23 2336)  Pulse: 61 (08/12/23 0400)  Resp: 14 (08/12/23 0400)  BP: (!) 160/74 (08/12/23 0400)  SpO2: 96 % (08/12/23 0400) Vital Signs (24h Range):  Temp:  [98.2 °F (36.8 °C)-98.3 °F (36.8 °C)] 98.3 °F (36.8 °C)  Pulse:  [52-61] 61  Resp:  [10-19] 14  SpO2:  [95 %-100 %] 96 %  BP: (130-214)/(65-90) 160/74     Weight: 57.6 kg (127 lb)  Body mass index is 25.65 kg/m².     Physical Exam  Vitals and nursing note reviewed.   HENT:      Head: Normocephalic and atraumatic.   Eyes:      Conjunctiva/sclera: Conjunctivae normal.   Neck:      Vascular: No JVD.   Cardiovascular:      Heart sounds: Normal heart sounds.   Pulmonary:      Effort: Pulmonary effort is normal.      Breath sounds: Normal breath sounds.   Abdominal:      Palpations: Abdomen is soft.   Skin:     General: Skin is warm.   Neurological:      Mental Status: She is alert. She is disoriented.                Significant Labs: All pertinent labs within the past 24 hours have been reviewed.  BMP:   Recent Labs   Lab 08/12/23  0126         K 3.7      CO2 23   BUN 23   CREATININE 0.8   CALCIUM 8.6*     CBC:   Recent Labs   Lab 08/10/23  0551 08/11/23  0613 08/12/23  0126   WBC 17.67* 17.41* 12.23   HGB 9.7* 9.5* 9.7*   HCT 27.2* 26.7* 27.0*    429 [id number]       Significant  Imaging: I have reviewed all pertinent imaging results/findings within the past 24 hours.

## 2023-08-12 NOTE — NURSING
Nurses Note -- 4 Eyes      8/12/2023   5:46 AM      Skin assessed during: Admit      [x] No Altered Skin Integrity Present    []Prevention Measures Documented      [] Yes- Altered Skin Integrity Present or Discovered   [] LDA Added if Not in Epic (Describe Wound)   [] New Altered Skin Integrity was Present on Admit and Documented in LDA   [] Wound Image Taken    Wound Care Consulted? No    Attending Nurse:  Sonny Webb RN/Staff Member:   rony

## 2023-08-12 NOTE — H&P
[facility] - Emergency Dept  Hospital Medicine  History & Physical    Patient Name: Steff Johnson  MRN: [id number]  Patient Class: OP- Observation  Admission Date: 8/11/2023  Attending Physician: Alf Bourne MD   Primary Care Provider: Cristina Ren MD         Patient information was obtained from patient and ER records.     Subjective:     Principal Problem:<principal problem not specified>    Chief Complaint:   Chief Complaint   Patient presents with    Other     Patient discharged from here today with home health, daughter states she does not know how to feed her mother. On the discharge instructions, there is instructions for a pureed diet in the chart due to dysphagia        HPI:    63 y.o.  female With PMH of stroke with expressive aphasia, right upper extremity paralysis, HTN, DM, COPD.    who presented with worsening AMS and work up was done and found new small stroke and later DC to home with HH as per significant other's wish who is living with patient for many years.    Today daughter from [place] brought back since she feels like her mother is not safe at home and feels like significant other of patient's mother is not giving adequate care and wanted to be placed .    Found WBC in the urine and admitted with possible UTI . Case Mx consulted.  Daughter is also interested in hospice placement too. Last time she was started on keppra for seizure prevention post stroke .    On exam , no CP , no SOB , non communicative  and no family member around .               Past Medical History:   Diagnosis Date    Arthritis     Complete tear of left rotator cuff 11/09/2017    COPD (chronic obstructive pulmonary disease)     COVID-19 infection 07/02/2021    Diabetes mellitus     H/o Cerebrovascular accident (CVA) of left pontine structure 12/12/2019    Hypertension     Personal history of colonic polyps     Stroke     Wears glasses        Past Surgical History:   Procedure Laterality Date     COLONOSCOPY N/A 4/11/2017    Procedure: COLONOSCOPY;  Surgeon: Jared Antonio MD;  Location: [facility];  Service: Endoscopy;  Laterality: N/A;    HYSTERECTOMY      OOPHORECTOMY      SHOULDER SURGERY      R    TRANSESOPHAGEAL ECHOCARDIOGRAM WITH POSSIBLE CARDIOVERSION (GT W/ POSS CARDIOVERSION) N/A 5/4/2023    Procedure: Transesophageal echo (GT) intra-procedure log documentation;  Surgeon: Blade Phillip MD;  Location: [facility] CATH/EP LAB;  Service: Cardiology;  Laterality: N/A;       Review of patient's allergies indicates:  No Known Allergies    Current Facility-Administered Medications on File Prior to Encounter   Medication    [DISCONTINUED] acetaminophen suppository 650 mg    [DISCONTINUED] amLODIPine tablet 10 mg    [DISCONTINUED] aspirin chewable tablet 81 mg    [DISCONTINUED] atorvastatin tablet 80 mg    [DISCONTINUED] dantrolene capsule 50 mg    [DISCONTINUED] dextrose 50% injection 12.5 g    [DISCONTINUED] enoxaparin injection 40 mg    [DISCONTINUED] EScitalopram oxalate tablet 10 mg    [DISCONTINUED] famotidine tablet 20 mg    [DISCONTINUED] GENERIC EXTERNAL MEDICATION    [DISCONTINUED] GENERIC EXTERNAL MEDICATION    [DISCONTINUED] glucagon (human recombinant) injection 1 mg    [DISCONTINUED] hydrALAZINE injection 10 mg    [DISCONTINUED] insulin aspart U-100 pen 0-5 Units    [DISCONTINUED] insulin detemir U-100 (Levemir) pen 20 Units    [DISCONTINUED] levETIRAcetam in NaCl (iso-os) IVPB 1,000 mg    [DISCONTINUED] melatonin tablet 6 mg    [DISCONTINUED] metoprolol tartrate (LOPRESSOR) tablet 100 mg    [DISCONTINUED] modafiniL tablet 200 mg    [DISCONTINUED] sodium chloride 0.9% flush 10 mL     Current Outpatient Medications on File Prior to Encounter   Medication Sig    amLODIPine (NORVASC) 10 MG tablet Take 1 tablet (10 mg total) by mouth once daily.    aspirin 81 MG Chew Take 1 tablet (81 mg total) by mouth once daily.    atorvastatin (LIPITOR) 80 MG tablet Take 1  tablet (80 mg total) by mouth every evening.    cloNIDine (CATAPRES) 0.1 MG tablet Take 1 tablet (0.1 mg total) by mouth 3 (three) times daily.    dantrolene (DANTRIUM) 50 MG Cap Take 50 mg by mouth 3 (three) times daily.    EScitalopram oxalate (LEXAPRO) 10 MG tablet Take 10 mg by mouth every evening.    insulin aspart U-100 (NOVOLOG) 100 unit/mL (3 mL) InPn pen Inject 5 Units into the skin 3 (three) times daily.    insulin detemir U-100, Levemir, 100 unit/mL (3 mL) SubQ InPn pen Inject 20 Units into the skin every evening.    insulin glargine (LANTUS) 100 unit/mL injection Inject 13 Units into the skin every evening.    insulin lispro 100 unit/mL injection Inject 5 Units into the skin 3 (three) times daily.    latanoprost 0.005 % ophthalmic solution Place 1 drop into both eyes once daily.    levETIRAcetam (KEPPRA) 1000 MG tablet Take 1 tablet (1,000 mg total) by mouth 2 (two) times daily.    losartan (COZAAR) 100 MG tablet Take 1 tablet (100 mg total) by mouth once daily.    metoprolol succinate (TOPROL-XL) 100 MG 24 hr tablet Take 1 tablet (100 mg total) by mouth once daily. (Patient taking differently: Take 50 mg by mouth once daily.)    minoxidiL (LONITEN) 10 MG Tab Take 10 mg by mouth 2 (two) times daily.    modafiniL (PROVIGIL) 200 MG Tab Take 200 mg by mouth once daily.    tiotropium (SPIRIVA) 18 mcg inhalation capsule Inhale 1 capsule (18 mcg total) into the lungs once daily. Controller    umeclidinium (INCRUSE ELLIPTA) 62.5 mcg/actuation inhalation capsule Inhale 62.5 mcg into the lungs once daily.    [DISCONTINUED] blood-glucose meter (TRUE METRIX GLUCOSE METER) kit Use as instructed    [DISCONTINUED] lancing device (TRUEDRAW LANCING DEVICE) Misc Inject 1 Device into the skin 2 (two) times daily.     Family History       Problem Relation (Age of Onset)    Anemia Daughter    Asthma Mother    Diabetes Mother, Father, Brother    Hypertension Mother, Brother          Tobacco Use    Smoking  status: Never    Smokeless tobacco: Never   Substance and Sexual Activity    Alcohol use: No    Drug use: No    Sexual activity: Not Currently     Review of Systems   Unable to perform ROS: Acuity of condition     Objective:     Vital Signs (Most Recent):  Temp: 98.3 °F (36.8 °C) (08/11/23 2336)  Pulse: 61 (08/12/23 0400)  Resp: 14 (08/12/23 0400)  BP: (!) 160/74 (08/12/23 0400)  SpO2: 96 % (08/12/23 0400) Vital Signs (24h Range):  Temp:  [98.2 °F (36.8 °C)-98.3 °F (36.8 °C)] 98.3 °F (36.8 °C)  Pulse:  [52-61] 61  Resp:  [10-19] 14  SpO2:  [95 %-100 %] 96 %  BP: (130-214)/(65-90) 160/74     Weight: 57.6 kg (127 lb)  Body mass index is 25.65 kg/m².     Physical Exam  Vitals and nursing note reviewed.   HENT:      Head: Normocephalic and atraumatic.   Eyes:      Conjunctiva/sclera: Conjunctivae normal.   Neck:      Vascular: No JVD.   Cardiovascular:      Heart sounds: Normal heart sounds.   Pulmonary:      Effort: Pulmonary effort is normal.      Breath sounds: Normal breath sounds.   Abdominal:      Palpations: Abdomen is soft.   Skin:     General: Skin is warm.   Neurological:      Mental Status: She is alert. She is disoriented.                Significant Labs: All pertinent labs within the past 24 hours have been reviewed.  BMP:   Recent Labs   Lab 08/12/23  0126         K 3.7      CO2 23   BUN 23   CREATININE 0.8   CALCIUM 8.6*     CBC:   Recent Labs   Lab 08/10/23  0551 08/11/23  0613 08/12/23  0126   WBC 17.67* 17.41* 12.23   HGB 9.7* 9.5* 9.7*   HCT 27.2* 26.7* 27.0*    [phone]       Significant Imaging: I have reviewed all pertinent imaging results/findings within the past 24 hours.    Assessment/Plan:     Active Hospital Problems    Diagnosis    UTI (urinary tract infection)    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    CVA (cerebral vascular accident)    Type 2 diabetes mellitus with diabetic nephropathy, HbA1c 8.9%    Hypertension associated with diabetes    COPD  (chronic obstructive pulmonary disease)     PLAN   ceftin   Consult Case Mx for placement and for possible hospice care if daughter/NOK  interested .  Resume home blood pressure meds   May need hospital  consultation who will make the decision for patient .  Follow urine culture       VTE Risk Mitigation (From admission, onward)         Ordered     enoxaparin injection 40 mg  Daily         08/12/23 0413     IP VTE HIGH RISK PATIENT  Once         08/12/23 0358     Place sequential compression device  Until discontinued         08/12/23 0358                   On 08/12/2023, patient should be placed in hospital observation services under my care.        Alf Bourne MD  Department of Hospital Medicine  [facility] Emergency Dept

## 2023-08-12 NOTE — ED PROVIDER NOTES
Encounter Date: 8/11/2023       History     Chief Complaint   Patient presents with    Other     Patient discharged from here today with home health, daughter states she does not know how to feed her mother. On the discharge instructions, there is instructions for a pureed diet in the chart due to dysphagia     63-year-old female presents for re-evaluation after discharge earlier today.  She is in poor health.  She has had a two month history of steady decline after multiple strokes and recent hospitalization for encephalopathy.  Yesterday, she was discharged to the care of her significant other with home health, although the daughters had wanted the patient placed in a SNF.  She was discharge with a pureed diet because she is nearly aphasic with difficulty tolerating p.o. at this time. She needs significant daily care. The patient's next of kin are her 2 daughters who live in [place].  They report that they were trying to get the patient placed in a SNF, but yesterday, the patient's significant other expressed desire to take the patient home with him.  The significant other did not want the patient's funds to be used on a skilled nursing facility. The daughter's wants the patient's money, including her disability and FDC, to be used for the patient's care and not to support the significant other.  The daughter's only learned of the discharge plan to the significant other's house in the afternoon, and they drove back from [place] to [place] to find the patient at her significant other's house which was in disarray.  The significant other was high at the time.  He was not taking care of his significant other.  The daughters expressed that they did not want the patient discharged to his care and wanted the patient to be placed in a SNF.       Review of patient's allergies indicates:  No Known Allergies  Past Medical History:   Diagnosis Date    Arthritis     Complete tear of left rotator cuff  11/09/2017    COPD (chronic obstructive pulmonary disease)     COVID-19 infection 07/02/2021    Diabetes mellitus     H/o Cerebrovascular accident (CVA) of left pontine structure 12/12/2019    Hypertension     Personal history of colonic polyps     Stroke     Wears glasses      Past Surgical History:   Procedure Laterality Date    COLONOSCOPY N/A 4/11/2017    Procedure: COLONOSCOPY;  Surgeon: Jared Antonio MD;  Location: [facility];  Service: Endoscopy;  Laterality: N/A;    HYSTERECTOMY      OOPHORECTOMY      SHOULDER SURGERY      R    TRANSESOPHAGEAL ECHOCARDIOGRAM WITH POSSIBLE CARDIOVERSION (GT W/ POSS CARDIOVERSION) N/A 5/4/2023    Procedure: Transesophageal echo (GT) intra-procedure log documentation;  Surgeon: Blade Phillip MD;  [facility]: [facility] CATH[facility]EP LAB;  Service: Cardiology;  Laterality: N/A;     Family History   Problem Relation Age of Onset    Diabetes Mother     Asthma Mother     Hypertension Mother     Diabetes Father     Diabetes Brother     Hypertension Brother     Anemia Daughter     Glaucoma Neg Hx     Macular degeneration Neg Hx     Retinal detachment Neg Hx      Social History     Tobacco Use    Smoking status: Never    Smokeless tobacco: Never   Substance Use Topics    Alcohol use: No    Drug use: No     Review of Systems   Unable to perform ROS: Patient nonverbal       Physical Exam     Initial Vitals [08/11/23 2336]   BP Pulse Resp Temp SpO2   (!) 179/85 (!) 57 18 98.3 °F (36.8 °C) 97 %      MAP       --         Physical Exam    Constitutional: She is not diaphoretic. No distress.   HENT:   Head: Normocephalic and atraumatic.   Eyes: Pupils are equal, round, and reactive to light.   Neck:   Normal range of motion.  Cardiovascular:  Normal rate and regular rhythm.           Pulmonary/Chest: No respiratory distress. She has no wheezes.   Abdominal: Abdomen is soft. She exhibits no distension. There is no abdominal tenderness.   Musculoskeletal:         General: No tenderness or edema.       Cervical back: Normal range of motion.     Neurological:   Patient is awake and alert, responding to verbal stimuli by looking around.  She is unable to communicate clearly.Right upper extremity and right upper extremity contractions.  She was able to squeeze my left hand when asked.           ED Course   Procedures  Labs Reviewed   CBC W/ AUTO DIFFERENTIAL - Abnormal; Notable for the following components:       Result Value    RBC 3.41 (*)     Hemoglobin 9.7 (*)     Hematocrit 27.0 (*)     MCV 79 (*)     RDW 15.0 (*)     Immature Granulocytes 2.4 (*)     Immature Grans (Abs) 0.29 (*)     Mono # 1.7 (*)     All other components within normal limits   COMPREHENSIVE METABOLIC PANEL - Abnormal; Notable for the following components:    Calcium 8.6 (*)     Albumin 3.1 (*)     Anion Gap 7 (*)     All other components within normal limits   URINALYSIS - Abnormal; Notable for the following components:    Protein, UA Trace (*)     Leukocytes, UA Trace (*)     All other components within normal limits    Narrative:     Collection Type->Urine, Clean Catch   URINALYSIS MICROSCOPIC - Abnormal; Notable for the following components:    WBC, UA 9 (*)     Hyaline Casts, UA 6 (*)     All other components within normal limits    Narrative:     Collection Type->Urine, Clean Catch   B-TYPE NATRIURETIC PEPTIDE        ECG Results              EKG 12-lead (In process)  Result time 08/12/23 05:13:12      In process by Interface, Lab In [facility] (08/12/23 05:13:12)                   Narrative:    Test Reason : I10,    Vent. Rate : 058 BPM     Atrial Rate : 058 BPM     P-R Int : 148 ms          QRS Dur : 072 ms      QT Int : 464 ms       P-R-T Axes : 028 008 026 degrees     QTc Int : 455 ms    Sinus bradycardia  Inferior infarct (cited on or before 04-AUG-2023)  Abnormal ECG  When compared with ECG of 04-AUG-2023 19:09,  No significant change was found    Referred By: [email or web address]   SELF           Confirmed By:                                    Imaging Results    None          Medications   amLODIPine tablet 10 mg (has no administration in time range)   aspirin chewable tablet 81 mg (has no administration in time range)   atorvastatin tablet 80 mg (has no administration in time range)   cloNIDine tablet 0.1 mg (has no administration in time range)   dantrolene capsule 50 mg (has no administration in time range)   EScitalopram oxalate tablet 10 mg (has no administration in time range)   insulin detemir U-100 (Levemir) pen 13 Units (has no administration in time range)   latanoprost 0.005 % ophthalmic solution 1 drop (has no administration in time range)   levETIRAcetam tablet 1,000 mg (has no administration in time range)   losartan tablet 100 mg (has no administration in time range)   metoprolol succinate (TOPROL-XL) 24 hr tablet 50 mg (has no administration in time range)   sodium chloride 0.9% flush 10 mL (has no administration in time range)   naloxone 0.4 mg/mL injection 0.02 mg (has no administration in time range)   glucose chewable tablet 16 g (has no administration in time range)   glucose chewable tablet 24 g (has no administration in time range)   dextrose 50% injection 12.5 g (has no administration in time range)   dextrose 50% injection 25 g (has no administration in time range)   glucagon (human recombinant) injection 1 mg (has no administration in time range)   enoxaparin injection 40 mg (has no administration in time range)   cefUROXime tablet 500 mg (has no administration in time range)   ipratropium 0.02 % nebulizer solution 0.5 mg (has no administration in time range)   enalaprilat injection 1.25 mg (1.25 mg Intravenous Given 8/12/23 0136)   hydrALAZINE injection 10 mg (10 mg Intravenous Given 8/12/23 0159)                            63-year-old female with history of multiple recent stroke, recent hospitalization for encephalopathy, presents now after failed discharge yesterday.  She was discharged yesterday afternoon with [facility]  diet and home health to the care of her significant other, with whom she has lived for many years, but who is unfit to care for the patient.  The patient's daughters wanted the patient placed in a SNF, and had been working to do so, but the patient's significant other agreed to take the patient home yesterday morning.  After discharge, the daughter's found the patient in the significant other's home which was in disarray.  The significant other was high. He told them he did not want the patient's correction and disability checks going to a SNF.  They brought the patient back for further evaluation.  Vitals notable for hypertension, treated with anti-hypertensives.  Patient unable to speak for herself.  CBC and CMP within normal limits.  UA concerning for UTI.  Will place patient in hospital for deconditioning, potential urinary tract infection and attempt to find placement.  Daughter, Leonie, was with the patient throughout the night and is available via phone number listed.      Clinical Impression:   Final diagnoses:  [I10] Hypertension  [G93.40] Encephalopathy  [N39.0] Urinary tract infection without hematuria, site unspecified  [R53.81] Physical deconditioning (Primary)        ED Disposition Condition    Observation                 Vladislav Javed MD  08/12/23 0602

## 2023-08-13 PROBLEM — Z75.8 PROBLEM RELATED TO DISCHARGE PLANNING: Status: ACTIVE | Noted: 2023-08-13

## 2023-08-13 PROBLEM — Z02.9 PROBLEM RELATED TO DISCHARGE PLANNING: Status: ACTIVE | Noted: 2023-08-13

## 2023-08-13 LAB — GLUCOSE SERPL-MCNC: 116 MG/DL (ref 70–110)

## 2023-08-13 PROCEDURE — 63600175 PHARM REV CODE 636 W HCPCS: Performed by: INTERNAL MEDICINE

## 2023-08-13 PROCEDURE — 25000003 PHARM REV CODE 250: Performed by: INTERNAL MEDICINE

## 2023-08-13 PROCEDURE — G0378 HOSPITAL OBSERVATION PER HR: HCPCS

## 2023-08-13 PROCEDURE — 99900035 HC TECH TIME PER 15 MIN (STAT)

## 2023-08-13 PROCEDURE — 96372 THER/PROPH/DIAG INJ SC/IM: CPT | Performed by: INTERNAL MEDICINE

## 2023-08-13 PROCEDURE — 94761 N-INVAS EAR/PLS OXIMETRY MLT: CPT

## 2023-08-13 RX ORDER — LEVETIRACETAM 10 MG/ML
1000 INJECTION INTRAVASCULAR EVERY 12 HOURS
Status: DISCONTINUED | OUTPATIENT
Start: 2023-08-14 | End: 2023-08-16 | Stop reason: HOSPADM

## 2023-08-13 RX ADMIN — ESCITALOPRAM OXALATE 10 MG: 10 TABLET ORAL at 10:08

## 2023-08-13 RX ADMIN — LATANOPROST 1 DROP: 50 SOLUTION OPHTHALMIC at 11:08

## 2023-08-13 RX ADMIN — CLONIDINE HYDROCHLORIDE 0.1 MG: 0.1 TABLET ORAL at 10:08

## 2023-08-13 RX ADMIN — DANTROLENE SODIUM 50 MG: 25 CAPSULE ORAL at 11:08

## 2023-08-13 RX ADMIN — AMLODIPINE BESYLATE 10 MG: 5 TABLET ORAL at 11:08

## 2023-08-13 RX ADMIN — DANTROLENE SODIUM 50 MG: 25 CAPSULE ORAL at 10:08

## 2023-08-13 RX ADMIN — LOSARTAN POTASSIUM 100 MG: 50 TABLET, FILM COATED ORAL at 11:08

## 2023-08-13 RX ADMIN — INSULIN DETEMIR 13 UNITS: 100 INJECTION, SOLUTION SUBCUTANEOUS at 10:08

## 2023-08-13 RX ADMIN — DANTROLENE SODIUM 50 MG: 25 CAPSULE ORAL at 04:08

## 2023-08-13 RX ADMIN — ATORVASTATIN CALCIUM 80 MG: 40 TABLET, FILM COATED ORAL at 10:08

## 2023-08-13 RX ADMIN — ENOXAPARIN SODIUM 40 MG: 40 INJECTION SUBCUTANEOUS at 04:08

## 2023-08-13 RX ADMIN — METOPROLOL SUCCINATE 50 MG: 50 TABLET, FILM COATED, EXTENDED RELEASE ORAL at 11:08

## 2023-08-13 RX ADMIN — CLONIDINE HYDROCHLORIDE 0.1 MG: 0.1 TABLET ORAL at 04:08

## 2023-08-13 RX ADMIN — ASPIRIN 81 MG 81 MG: 81 TABLET ORAL at 11:08

## 2023-08-13 RX ADMIN — LEVETIRACETAM 1000 MG: 500 TABLET, FILM COATED ORAL at 11:08

## 2023-08-13 RX ADMIN — CLONIDINE HYDROCHLORIDE 0.1 MG: 0.1 TABLET ORAL at 11:08

## 2023-08-13 RX ADMIN — CEFUROXIME AXETIL 500 MG: 250 TABLET, FILM COATED ORAL at 11:08

## 2023-08-13 NOTE — CARE UPDATE
Maintain current Rx  Pt medically stable and awaiting placement  Palliative care team consulted  Pt ideally should be under hospice care

## 2023-08-13 NOTE — PROGRESS NOTES
[facility]  Progress Note    Patient Name: Steff Johnson  MRN: [id number]  Patient Class: OP- Observation   Admission Date: 8/11/2023  Length of Stay: 0 days  Attending Physician: Roger Berry MD  Primary Care Provider: Cristina Ren MD        Subjective:     Principal Problem:Problem related to discharge planning        HPI:     63 y.o.  female With PMH of stroke with expressive aphasia, right upper extremity paralysis, HTN, DM, COPD.    who presented with worsening AMS and work up was done and found new small stroke and later DC to home with HH as per significant other's wish who is living with patient for many years.    Today daughter from [place] brought back since she feels like her mother is not safe at home and feels like significant other of patient's mother is not giving adequate care and wanted to be placed .    Found WBC in the urine and admitted with possible UTI . Case Mx consulted.  Daughter is also interested in hospice placement too. Last time she was started on keppra for seizure prevention post stroke .    On exam , no CP , no SOB , non communicative  and no family member around .               Overview/Hospital Course:  08/13  Medically stable  Awaiting SNF placement      Interval History:       Review of Systems   Unable to perform ROS: Patient nonverbal     Objective:     Vital Signs (Most Recent):  Temp: 98.3 °F (36.8 °C) (08/13/23 1104)  Pulse: (!) 53 (08/13/23 1100)  Resp: 18 (08/13/23 1100)  BP: (!) 143/75 (08/13/23 1104)  SpO2: 98 % (08/13/23 1100) Vital Signs (24h Range):  Temp:  [97.8 °F (36.6 °C)-99.4 °F (37.4 °C)] 98.3 °F (36.8 °C)  Pulse:  [51-79] 53  Resp:  [16-19] 18  SpO2:  [96 %-100 %] 98 %  BP: (115-143)/(65-76) 143/75     Weight: 55.8 kg (123 lb 0.3 oz)  Body mass index is 24.85 kg/m².    Intake/Output Summary (Last 24 hours) at 8/13/2023 1530  Last data filed at 8/13/2023 0440  Gross per 24 hour   Intake 120 ml   Output 350 ml   Net -230 ml          Physical Exam  Vitals and nursing note reviewed.   Constitutional:       General: She is not in acute distress.     Appearance: Normal appearance.   HENT:      Head: Atraumatic.      Right Ear: External ear normal.      Left Ear: External ear normal.      Nose: Nose normal.      Mouth/Throat:      Mouth: Mucous membranes are moist.   Cardiovascular:      Rate and Rhythm: Normal rate.   Pulmonary:      Effort: Pulmonary effort is normal.   Abdominal:      Palpations: Abdomen is soft.   Musculoskeletal:         General: Normal range of motion.      Cervical back: Normal range of motion and neck supple.   Skin:     General: Skin is dry.   Neurological:      Mental Status: Mental status is at baseline.             Significant Labs: All pertinent labs within the past 24 hours have been reviewed.  CBC:   Recent Labs   Lab 08/12/23  0126   WBC 12.23   HGB 9.7*   HCT 27.0*        CMP:   Recent Labs   Lab 08/12/23  0126      K 3.7      CO2 23      BUN 23   CREATININE 0.8   CALCIUM 8.6*   PROT 6.5   ALBUMIN 3.1*   BILITOT 0.6   ALKPHOS 82   AST 15   ALT 31   ANIONGAP 7*       Significant Imaging: I have reviewed all pertinent imaging results/findings within the past 24 hours.      Assessment/Plan:      * Problem related to discharge planning  Pt was hospitalized recently  Plan was to discharge pt to SNF  Pts significant other opted for discharge to home with HH and pt was discharged  Pts daughters brought back pt to hospital within 24 hrsl and wants SNF placement !  They are ok with Palliative care consultation too   Pt medically stable and awaiting SNF placement   Ideally should be under hospice care       CVA (cerebral vascular accident)  Recent CVA  Tiny subcortical white matter hyperintensity in the left parietal lobe near the vertex on diffusion-weighted imaging, suggesting small acute or subacute ischemic insult,Per MRI Done recently  Pt has history of  stroke with expressive aphasia, right  upper extremity paralysis,and has cognitive defecits      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  Stable       Type 2 diabetes mellitus with diabetic nephropathy, HbA1c 8.9%  Maintain present regime    COPD (chronic obstructive pulmonary disease)  Stable       Hypertension associated with diabetes  Maintain present Rx      VTE Risk Mitigation (From admission, onward)         Ordered     enoxaparin injection 40 mg  Daily         08/12/23 0413     IP VTE HIGH RISK PATIENT  Once         08/12/23 0358     Place sequential compression device  Until discontinued         08/12/23 0358                Discharge Planning   ALEX:      Code Status: Full Code   Is the patient medically ready for discharge?:     Reason for patient still in hospital (select all that apply): Pending disposition  Discharge Plan A: Long-term acute care facility (LTAC)                  Roger Berry MD  Department of Hospital Medicine   [facility]

## 2023-08-13 NOTE — HOSPITAL COURSE
This Pt was hospitalized recently with CVA/seizure disorder  Plan was to discharge pt to SNF  Pts significant other opted for discharge to home with HH and pt was discharged  Pts daughters brought back pt to hospital within 24 hrsl and demanded SNF placement !  Pt was started on abx after she developed fever this time  CXR and all cultures were normal  Pt was evaluated by palliative care team and daughter agreed with DNR status  Eventually pt was discharged to SNF area in [facility] where daughters reside  Her long term prognosis is very poor and ideally should be under hospice care

## 2023-08-13 NOTE — CARE UPDATE
08/12/23 2300   Patient Assessment/Suction   Level of Consciousness (AVPU) alert   Respiratory Effort Normal;Unlabored   PRE-TX-O2   Device (Oxygen Therapy) room air   Pulse Oximetry Type Intermittent   $ Pulse Oximetry - Multiple Charge Pulse Oximetry - Multiple   Pulse (!) 59   Resp 18   Positioning HOB elevated 30 degrees   Positioning   Head of Bed (HOB) Positioning HOB at 20-30 degrees   Positioning/Transfer Devices pillows;wedge   Aerosol Therapy   $ Aerosol Therapy Charges PRN treatment not required   Breath Sounds Post-Respiratory Treatment   Throughout All Fields Post-Treatment All Fields   Throughout All Fields Post-Treatment aeration increased   Post-treatment Heart Rate (beats/min) 59   Post-treatment Resp Rate (breaths/min) 18

## 2023-08-13 NOTE — CARE UPDATE
08/13/23 0804   Patient Assessment/Suction   Level of Consciousness (AVPU) responds to voice   Respiratory Effort Normal;Unlabored   Expansion/Accessory Muscles/Retractions no use of accessory muscles;no retractions;expansion symmetric   All Lung Fields Breath Sounds clear   Rhythm/Pattern, Respiratory unlabored;pattern regular;depth regular;chest wiggle adequate;no shortness of breath reported   Cough Frequency no cough   PRE-TX-O2   Device (Oxygen Therapy) room air   SpO2 96 %   Pulse Oximetry Type Intermittent   $ Pulse Oximetry - Multiple Charge Pulse Oximetry - Multiple   Pulse 79   Resp 18   Aerosol Therapy   $ Aerosol Therapy Charges PRN treatment not required   Respiratory Evaluation   $ Care Plan Tech Time 15 min

## 2023-08-13 NOTE — ASSESSMENT & PLAN NOTE
Recent CVA  Tiny subcortical white matter hyperintensity in the left parietal lobe near the vertex on diffusion-weighted imaging, suggesting small acute or subacute ischemic insult,Per MRI Done recently  Pt has history of  stroke with expressive aphasia, right upper extremity paralysis,and has cognitive defecits

## 2023-08-13 NOTE — SUBJECTIVE & OBJECTIVE
Interval History:       Review of Systems   Unable to perform ROS: Patient nonverbal     Objective:     Vital Signs (Most Recent):  Temp: 98.3 °F (36.8 °C) (08/13/23 1104)  Pulse: (!) 53 (08/13/23 1100)  Resp: 18 (08/13/23 1100)  BP: (!) 143/75 (08/13/23 1104)  SpO2: 98 % (08/13/23 1100) Vital Signs (24h Range):  Temp:  [97.8 °F (36.6 °C)-99.4 °F (37.4 °C)] 98.3 °F (36.8 °C)  Pulse:  [51-79] 53  Resp:  [16-19] 18  SpO2:  [96 %-100 %] 98 %  BP: (115-143)/(65-76) 143/75     Weight: 55.8 kg (123 lb 0.3 oz)  Body mass index is 24.85 kg/m².    Intake/Output Summary (Last 24 hours) at 8/13/2023 1538  Last data filed at 8/13/2023 0440  Gross per 24 hour   Intake 120 ml   Output 350 ml   Net -230 ml         Physical Exam  Vitals and nursing note reviewed.   Constitutional:       General: She is not in acute distress.     Appearance: Normal appearance.   HENT:      Head: Atraumatic.      Right Ear: External ear normal.      Left Ear: External ear normal.      Nose: Nose normal.      Mouth/Throat:      Mouth: Mucous membranes are moist.   Cardiovascular:      Rate and Rhythm: Normal rate.   Pulmonary:      Effort: Pulmonary effort is normal.   Abdominal:      Palpations: Abdomen is soft.   Musculoskeletal:         General: Normal range of motion.      Cervical back: Normal range of motion and neck supple.   Skin:     General: Skin is dry.   Neurological:      Mental Status: Mental status is at baseline.             Significant Labs: All pertinent labs within the past 24 hours have been reviewed.  CBC:   Recent Labs   Lab 08/12/23  0126   WBC 12.23   HGB 9.7*   HCT 27.0*        CMP:   Recent Labs   Lab 08/12/23  0126      K 3.7      CO2 23      BUN 23   CREATININE 0.8   CALCIUM 8.6*   PROT 6.5   ALBUMIN 3.1*   BILITOT 0.6   ALKPHOS 82   AST 15   ALT 31   ANIONGAP 7*       Significant Imaging: I have reviewed all pertinent imaging results/findings within the past 24 hours.

## 2023-08-13 NOTE — ASSESSMENT & PLAN NOTE
Pt was hospitalized recently  Plan was to discharge pt to SNF  Pts significant other opted for discharge to home with HH and pt was discharged  Pts daughters brought back pt to hospital within 24 hrsl and wants SNF placement !  They are ok with Palliative care consultation too   Pt medically stable and awaiting SNF placement   Ideally should be under hospice care

## 2023-08-13 NOTE — PLAN OF CARE
Problem: Infection  Goal: Absence of Infection Signs and Symptoms  Outcome: Ongoing, Progressing     Problem: Adult Inpatient Plan of Care  Goal: Plan of Care Review  Outcome: Ongoing, Progressing  Goal: Patient-Specific Goal (Individualized)  Outcome: Ongoing, Progressing  Goal: Absence of Hospital-Acquired Illness or Injury  Outcome: Ongoing, Progressing  Goal: Optimal Comfort and Wellbeing  Outcome: Ongoing, Progressing  Goal: Readiness for Transition of Care  Outcome: Ongoing, Progressing     Problem: Diabetes Comorbidity  Goal: Blood Glucose Level Within Targeted Range  Outcome: Ongoing, Progressing     Problem: Fall Injury Risk  Goal: Absence of Fall and Fall-Related Injury  Outcome: Ongoing, Progressing     Problem: Skin Injury Risk Increased  Goal: Skin Health and Integrity  Outcome: Ongoing, Progressing     Problem: Coping Ineffective  Goal: Effective Coping  Outcome: Ongoing, Progressing

## 2023-08-14 ENCOUNTER — PATIENT OUTREACH (OUTPATIENT)
Dept: ADMINISTRATIVE | Facility: CLINIC | Age: 63
End: 2023-08-14
Payer: MEDICARE

## 2023-08-14 LAB
GLUCOSE SERPL-MCNC: 114 MG/DL (ref 70–110)
GLUCOSE SERPL-MCNC: 127 MG/DL (ref 70–110)
GLUCOSE SERPL-MCNC: 131 MG/DL (ref 70–110)
GLUCOSE SERPL-MCNC: 145 MG/DL (ref 70–110)

## 2023-08-14 PROCEDURE — 25000003 PHARM REV CODE 250: Performed by: INTERNAL MEDICINE

## 2023-08-14 PROCEDURE — 96372 THER/PROPH/DIAG INJ SC/IM: CPT | Mod: 59 | Performed by: INTERNAL MEDICINE

## 2023-08-14 PROCEDURE — G0378 HOSPITAL OBSERVATION PER HR: HCPCS

## 2023-08-14 PROCEDURE — 99900035 HC TECH TIME PER 15 MIN (STAT)

## 2023-08-14 PROCEDURE — 63600175 PHARM REV CODE 636 W HCPCS: Performed by: INTERNAL MEDICINE

## 2023-08-14 PROCEDURE — 94760 N-INVAS EAR/PLS OXIMETRY 1: CPT

## 2023-08-14 RX ADMIN — CLONIDINE HYDROCHLORIDE 0.1 MG: 0.1 TABLET ORAL at 10:08

## 2023-08-14 RX ADMIN — ASPIRIN 81 MG 81 MG: 81 TABLET ORAL at 10:08

## 2023-08-14 RX ADMIN — ATORVASTATIN CALCIUM 80 MG: 40 TABLET, FILM COATED ORAL at 09:08

## 2023-08-14 RX ADMIN — ESCITALOPRAM OXALATE 10 MG: 10 TABLET ORAL at 09:08

## 2023-08-14 RX ADMIN — LEVETIRACETAM 1000 MG: 10 INJECTION INTRAVENOUS at 09:08

## 2023-08-14 RX ADMIN — LEVETIRACETAM 1000 MG: 10 INJECTION INTRAVENOUS at 12:08

## 2023-08-14 RX ADMIN — LOSARTAN POTASSIUM 100 MG: 50 TABLET, FILM COATED ORAL at 10:08

## 2023-08-14 RX ADMIN — ACETAMINOPHEN 650 MG: 325 TABLET ORAL at 09:08

## 2023-08-14 RX ADMIN — ENOXAPARIN SODIUM 40 MG: 40 INJECTION SUBCUTANEOUS at 05:08

## 2023-08-14 RX ADMIN — DANTROLENE SODIUM 50 MG: 25 CAPSULE ORAL at 09:08

## 2023-08-14 RX ADMIN — CLONIDINE HYDROCHLORIDE 0.1 MG: 0.1 TABLET ORAL at 09:08

## 2023-08-14 RX ADMIN — INSULIN DETEMIR 13 UNITS: 100 INJECTION, SOLUTION SUBCUTANEOUS at 09:08

## 2023-08-14 RX ADMIN — CLONIDINE HYDROCHLORIDE 0.1 MG: 0.1 TABLET ORAL at 02:08

## 2023-08-14 RX ADMIN — DANTROLENE SODIUM 50 MG: 25 CAPSULE ORAL at 02:08

## 2023-08-14 RX ADMIN — AMLODIPINE BESYLATE 10 MG: 5 TABLET ORAL at 10:08

## 2023-08-14 RX ADMIN — LATANOPROST 1 DROP: 50 SOLUTION OPHTHALMIC at 10:08

## 2023-08-14 RX ADMIN — METOPROLOL SUCCINATE 50 MG: 50 TABLET, FILM COATED, EXTENDED RELEASE ORAL at 10:08

## 2023-08-14 NOTE — PLAN OF CARE
Referrals sent to multiple facilities in [place], [place] per Leonie Johnson (Daughter) [phone] (Mobile) request.        08/14/23 1250   Post-Acute Status   Post-Acute Authorization Placement   Post-Acute Placement Status Referrals Sent   Coverage Human ASHLEY   Patient choice form signed by patient/caregiver List with quality metrics by geographic area provided;List from CMS Compare;List from System Post-Acute Care   Discharge Delays (!) Post-Acute Set-up   Discharge Plan   Discharge Plan A Skilled Nursing Facility   Discharge Plan B Skilled Nursing Facility

## 2023-08-14 NOTE — PLAN OF CARE
Met with Leonie Johnson (Daughter) [phone] (Mobile) at bedside to discuss SNF placement and get choices form signed. Form obtained and scanned into . Daughter states she wants patient to be placed in a SNF in the [place], [place] area since she and her sister live in [place]. Referrals sent via [facility] to [facility], [facility] skilled, [facility], [facility] nursing, [place] [facility], [facility], [facility], [facility], [facility], [facility] [place], [facility], [facility], [facility], [facility] nursing, [facility] nursing [facility] rehab, [facility], [facility], [facility], [facility], [facility], [facility], [facility], and [facility]. Will continue to follow and update as appropriate.

## 2023-08-14 NOTE — PLAN OF CARE
Brief Palliative Note:    Consult received. Chart reviewed. Patient lacks capacity. I have set up a family meeting for tomorrow at 9:00am. Recs to follow.    Lenadro Renee MD

## 2023-08-14 NOTE — PLAN OF CARE
At this time, have received SNF to [facility] acceptance from [facility], [facility] nursing [facility] rehab, [facility], and the [facility]. Spoke with Leonie Johnson (Daughter)   [phone] (Mobile) on phone to inform of accepting facilities and she requests to meet in the morning to review them and make a final decision on what facility to pursue. Will continue to follow and update as appropriate.

## 2023-08-14 NOTE — HPI
63-year-old female with multiple medical problems significant for history of CVA with expressive aphasia and right upper extremity paresis, hypertension, chronic kidney disease, diabetes, and COPD.  She experienced a recent admission where she was diagnosed with new small strokes in the left parietal lobe.  She was treated with supportive measures and discharged home.  Within the same day she was brought back to the emergency room by her daughter as she believes is unsafe for her mother to be home.  Workup did reveal a UTI for which she was admitted.  Given her debilitated state she carries a guarded prognosis.  I have been asked to assist with goals of care.

## 2023-08-14 NOTE — CARE UPDATE
08/13/23 2104   Patient Assessment/Suction   Level of Consciousness (AVPU) responds to voice   Respiratory Effort Normal   Expansion/Accessory Muscles/Retractions no use of accessory muscles   PRE-TX-O2   Device (Oxygen Therapy) room air   SpO2 98 %   Pulse Oximetry Type Intermittent   $ Pulse Oximetry - Multiple Charge Pulse Oximetry - Multiple   Pulse (!) 56   Resp 16   Positioning HOB elevated 30 degrees   Positioning   Head of Bed (HOB) Positioning HOB at 20-30 degrees   Positioning/Transfer Devices pillows;in use   Aerosol Therapy   $ Aerosol Therapy Charges PRN treatment not required

## 2023-08-15 PROBLEM — R50.9 FEVER: Status: ACTIVE | Noted: 2023-08-15

## 2023-08-15 LAB
GLUCOSE SERPL-MCNC: 120 MG/DL (ref 70–110)
GLUCOSE SERPL-MCNC: 129 MG/DL (ref 70–110)
GLUCOSE SERPL-MCNC: 78 MG/DL (ref 70–110)
GLUCOSE SERPL-MCNC: 95 MG/DL (ref 70–110)

## 2023-08-15 PROCEDURE — 99223 1ST HOSP IP/OBS HIGH 75: CPT | Mod: 95,,, | Performed by: INTERNAL MEDICINE

## 2023-08-15 PROCEDURE — 96367 TX/PROPH/DG ADDL SEQ IV INF: CPT

## 2023-08-15 PROCEDURE — 63600175 PHARM REV CODE 636 W HCPCS: Performed by: INTERNAL MEDICINE

## 2023-08-15 PROCEDURE — 25000003 PHARM REV CODE 250: Performed by: INTERNAL MEDICINE

## 2023-08-15 PROCEDURE — 99900035 HC TECH TIME PER 15 MIN (STAT)

## 2023-08-15 PROCEDURE — 94761 N-INVAS EAR/PLS OXIMETRY MLT: CPT

## 2023-08-15 PROCEDURE — 99223 PR INITIAL HOSPITAL CARE,LEVL III: ICD-10-PCS | Mod: 95,,, | Performed by: INTERNAL MEDICINE

## 2023-08-15 PROCEDURE — 36415 COLL VENOUS BLD VENIPUNCTURE: CPT | Performed by: INTERNAL MEDICINE

## 2023-08-15 PROCEDURE — 87040 BLOOD CULTURE FOR BACTERIA: CPT | Performed by: INTERNAL MEDICINE

## 2023-08-15 PROCEDURE — 96365 THER/PROPH/DIAG IV INF INIT: CPT

## 2023-08-15 PROCEDURE — G0378 HOSPITAL OBSERVATION PER HR: HCPCS

## 2023-08-15 PROCEDURE — 96372 THER/PROPH/DIAG INJ SC/IM: CPT | Mod: 59 | Performed by: INTERNAL MEDICINE

## 2023-08-15 PROCEDURE — 87086 URINE CULTURE/COLONY COUNT: CPT | Performed by: INTERNAL MEDICINE

## 2023-08-15 PROCEDURE — 99900031 HC PATIENT EDUCATION (STAT)

## 2023-08-15 RX ORDER — LEVOFLOXACIN 5 MG/ML
500 INJECTION, SOLUTION INTRAVENOUS
Status: DISCONTINUED | OUTPATIENT
Start: 2023-08-15 | End: 2023-08-16 | Stop reason: HOSPADM

## 2023-08-15 RX ORDER — BISACODYL 10 MG
10 SUPPOSITORY, RECTAL RECTAL DAILY PRN
Status: DISCONTINUED | OUTPATIENT
Start: 2023-08-15 | End: 2023-08-16 | Stop reason: HOSPADM

## 2023-08-15 RX ADMIN — BISACODYL 10 MG: 10 SUPPOSITORY RECTAL at 03:08

## 2023-08-15 RX ADMIN — ASPIRIN 81 MG 81 MG: 81 TABLET ORAL at 09:08

## 2023-08-15 RX ADMIN — CLONIDINE HYDROCHLORIDE 0.1 MG: 0.1 TABLET ORAL at 02:08

## 2023-08-15 RX ADMIN — DANTROLENE SODIUM 50 MG: 25 CAPSULE ORAL at 09:08

## 2023-08-15 RX ADMIN — LATANOPROST 1 DROP: 50 SOLUTION OPHTHALMIC at 09:08

## 2023-08-15 RX ADMIN — CLONIDINE HYDROCHLORIDE 0.1 MG: 0.1 TABLET ORAL at 09:08

## 2023-08-15 RX ADMIN — ACETAMINOPHEN 650 MG: 325 TABLET ORAL at 07:08

## 2023-08-15 RX ADMIN — DANTROLENE SODIUM 50 MG: 25 CAPSULE ORAL at 02:08

## 2023-08-15 RX ADMIN — LEVOFLOXACIN 500 MG: 5 INJECTION, SOLUTION INTRAVENOUS at 10:08

## 2023-08-15 RX ADMIN — CLONIDINE HYDROCHLORIDE 0.1 MG: 0.1 TABLET ORAL at 08:08

## 2023-08-15 RX ADMIN — LEVETIRACETAM 1000 MG: 10 INJECTION INTRAVENOUS at 09:08

## 2023-08-15 RX ADMIN — INSULIN DETEMIR 13 UNITS: 100 INJECTION, SOLUTION SUBCUTANEOUS at 08:08

## 2023-08-15 RX ADMIN — LOSARTAN POTASSIUM 100 MG: 50 TABLET, FILM COATED ORAL at 09:08

## 2023-08-15 RX ADMIN — ATORVASTATIN CALCIUM 80 MG: 40 TABLET, FILM COATED ORAL at 08:08

## 2023-08-15 RX ADMIN — LEVETIRACETAM 1000 MG: 10 INJECTION INTRAVENOUS at 08:08

## 2023-08-15 RX ADMIN — ESCITALOPRAM OXALATE 10 MG: 10 TABLET ORAL at 08:08

## 2023-08-15 RX ADMIN — AMLODIPINE BESYLATE 10 MG: 5 TABLET ORAL at 09:08

## 2023-08-15 RX ADMIN — ENOXAPARIN SODIUM 40 MG: 40 INJECTION SUBCUTANEOUS at 04:08

## 2023-08-15 NOTE — PLAN OF CARE
Spoke with Leonie Johnson (Daughter) [phone] (Mobile) on phone, obtained choice of SNF which is [facility] in . Spoke with Patricia Mcgill [phone],  at [place] who states they will request auth from [facility] and expect to get a response tomorrow 8/16/23. Patricia requests recent therapy notes. PT, OT and ST evals ordered as no therapy contacts since 8/11/23. Will continue to follow and update as appropriate.

## 2023-08-15 NOTE — PROGRESS NOTES
[facility] Medicine  Progress Note    Patient Name: Steff Johnson  MRN: [id number]  Patient Class: OP- Observation   Admission Date: 8/11/2023  Length of Stay: 0 days  Attending Physician: Roger Berry MD  Primary Care Provider: Cristina Ren MD        Subjective:     Principal Problem:Problem related to discharge planning        HPI:     63 y.o.  female With PMH of stroke with expressive aphasia, right upper extremity paralysis, HTN, DM, COPD.    who presented with worsening AMS and work up was done and found new small stroke and later DC to home with HH as per significant other's wish who is living with patient for many years.    Today daughter from [place] brought back since she feels like her mother is not safe at home and feels like significant other of patient's mother is not giving adequate care and wanted to be placed .    Found WBC in the urine and admitted with possible UTI . Case Mx consulted.  Daughter is also interested in hospice placement too. Last time she was started on keppra for seizure prevention post stroke .    On exam , no CP , no SOB , non communicative  and no family member around .               Overview/Hospital Course:  08/13  Medically stable  Awaiting SNF placement    08/14  Medically stable  Awaiting SNF placement      Interval History:     Review of Systems   Unable to perform ROS: Patient nonverbal     Objective:     Vital Signs (Most Recent):  Temp: 99.2 °F (37.3 °C) (08/14/23 1700)  Pulse: (!) 57 (08/14/23 1700)  Resp: 18 (08/14/23 1700)  BP: (!) 149/70 (08/14/23 1700)  SpO2: 97 % (08/14/23 1700) Vital Signs (24h Range):  Temp:  [98.5 °F (36.9 °C)-99.2 °F (37.3 °C)] 99.2 °F (37.3 °C)  Pulse:  [52-57] 57  Resp:  [16-18] 18  SpO2:  [97 %-99 %] 97 %  BP: (135-163)/(70-86) 149/70     Weight: 55.8 kg (123 lb 0.3 oz)  Body mass index is 24.85 kg/m².    Intake/Output Summary (Last 24 hours) at 8/14/2023 1932  Last data filed at 8/14/2023 1719  Gross per 24 hour    Intake 240 ml   Output 500 ml   Net -260 ml         Physical Exam  Vitals and nursing note reviewed.   Constitutional:       General: She is not in acute distress.  HENT:      Head: Atraumatic.      Right Ear: External ear normal.      Left Ear: External ear normal.      Nose: Nose normal.      Mouth/Throat:      Mouth: Mucous membranes are moist.   Cardiovascular:      Rate and Rhythm: Normal rate.   Pulmonary:      Effort: Pulmonary effort is normal.   Abdominal:      Palpations: Abdomen is soft.   Musculoskeletal:         General: Normal range of motion.      Cervical back: Normal range of motion.   Skin:     General: Skin is dry.   Neurological:      Mental Status: Mental status is at baseline.                 Assessment/Plan:      * Problem related to discharge planning  Pt was hospitalized recently  Plan was to discharge pt to SNF  Pts significant other opted for discharge to home with HH and pt was discharged  Pts daughters brought back pt to hospital within 24 hrsl and wants SNF placement !  They are ok with Palliative care consultation too   Pt medically stable and awaiting SNF placement   Ideally should be under hospice care       CVA (cerebral vascular accident)  Recent CVA  Tiny subcortical white matter hyperintensity in the left parietal lobe near the vertex on diffusion-weighted imaging, suggesting small acute or subacute ischemic insult,Per MRI Done recently  Pt has history of  stroke with expressive aphasia, right upper extremity paralysis,and has cognitive defecits      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  Stable       Type 2 diabetes mellitus with diabetic nephropathy, HbA1c 8.9%  Maintain present regime    COPD (chronic obstructive pulmonary disease)  Stable       Hypertension associated with diabetes  Maintain present Rx      VTE Risk Mitigation (From admission, onward)         Ordered     enoxaparin injection 40 mg  Daily         08/12/23 0413     IP VTE HIGH RISK PATIENT  Once          08/12/23 0358     Place sequential compression device  Until discontinued         08/12/23 0358                Discharge Planning   ALEX: 8/17/2023     Code Status: Full Code   Is the patient medically ready for discharge?:     Reason for patient still in hospital (select all that apply): Pending disposition  Discharge Plan A: Skilled Nursing Facility   Discharge Delays: (!) Post-Acute Set-up              Roger Berry MD  Department of Hospital Medicine   [facility]

## 2023-08-15 NOTE — SUBJECTIVE & OBJECTIVE
Interval History:     Review of Systems   Unable to perform ROS: Patient nonverbal     Objective:     Vital Signs (Most Recent):  Temp: 99.2 °F (37.3 °C) (08/14/23 1700)  Pulse: (!) 57 (08/14/23 1700)  Resp: 18 (08/14/23 1700)  BP: (!) 149/70 (08/14/23 1700)  SpO2: 97 % (08/14/23 1700) Vital Signs (24h Range):  Temp:  [98.5 °F (36.9 °C)-99.2 °F (37.3 °C)] 99.2 °F (37.3 °C)  Pulse:  [52-57] 57  Resp:  [16-18] 18  SpO2:  [97 %-99 %] 97 %  BP: (135-163)/(70-86) 149/70     Weight: 55.8 kg (123 lb 0.3 oz)  Body mass index is 24.85 kg/m².    Intake/Output Summary (Last 24 hours) at 8/14/2023 1932  Last data filed at 8/14/2023 1719  Gross per 24 hour   Intake 240 ml   Output 500 ml   Net -260 ml         Physical Exam  Vitals and nursing note reviewed.   Constitutional:       General: She is not in acute distress.  HENT:      Head: Atraumatic.      Right Ear: External ear normal.      Left Ear: External ear normal.      Nose: Nose normal.      Mouth/Throat:      Mouth: Mucous membranes are moist.   Cardiovascular:      Rate and Rhythm: Normal rate.   Pulmonary:      Effort: Pulmonary effort is normal.   Abdominal:      Palpations: Abdomen is soft.   Musculoskeletal:         General: Normal range of motion.      Cervical back: Normal range of motion.   Skin:     General: Skin is dry.   Neurological:      Mental Status: Mental status is at baseline.

## 2023-08-15 NOTE — PLAN OF CARE
Problem: Adult Inpatient Plan of Care  Goal: Plan of Care Review  Outcome: Ongoing, Progressing     Problem: Diabetes Comorbidity  Goal: Blood Glucose Level Within Targeted Range  Outcome: Ongoing, Progressing     Problem: Fall Injury Risk  Goal: Absence of Fall and Fall-Related Injury  Outcome: Ongoing, Progressing     Problem: Skin Injury Risk Increased  Goal: Skin Health and Integrity  Outcome: Ongoing, Progressing

## 2023-08-16 VITALS
SYSTOLIC BLOOD PRESSURE: 175 MMHG | WEIGHT: 123 LBS | DIASTOLIC BLOOD PRESSURE: 71 MMHG | RESPIRATION RATE: 16 BRPM | BODY MASS INDEX: 24.8 KG/M2 | HEART RATE: 54 BPM | OXYGEN SATURATION: 99 % | HEIGHT: 59 IN | TEMPERATURE: 98 F

## 2023-08-16 PROBLEM — Z75.8 PROBLEM RELATED TO DISCHARGE PLANNING: Status: RESOLVED | Noted: 2023-08-13 | Resolved: 2023-08-16

## 2023-08-16 PROBLEM — Z71.89 GOALS OF CARE, COUNSELING/DISCUSSION: Status: ACTIVE | Noted: 2023-08-16

## 2023-08-16 PROBLEM — Z02.9 PROBLEM RELATED TO DISCHARGE PLANNING: Status: RESOLVED | Noted: 2023-08-13 | Resolved: 2023-08-16

## 2023-08-16 PROBLEM — R50.9 FEVER: Status: RESOLVED | Noted: 2023-08-15 | Resolved: 2023-08-16

## 2023-08-16 PROBLEM — Z71.89 GOALS OF CARE, COUNSELING/DISCUSSION: Status: RESOLVED | Noted: 2023-08-16 | Resolved: 2023-08-16

## 2023-08-16 LAB
GLUCOSE SERPL-MCNC: 122 MG/DL (ref 70–110)
GLUCOSE SERPL-MCNC: 80 MG/DL (ref 70–110)

## 2023-08-16 PROCEDURE — 63600175 PHARM REV CODE 636 W HCPCS: Performed by: INTERNAL MEDICINE

## 2023-08-16 PROCEDURE — 94760 N-INVAS EAR/PLS OXIMETRY 1: CPT

## 2023-08-16 PROCEDURE — 97165 OT EVAL LOW COMPLEX 30 MIN: CPT

## 2023-08-16 PROCEDURE — 92523 SPEECH SOUND LANG COMPREHEN: CPT

## 2023-08-16 PROCEDURE — G0378 HOSPITAL OBSERVATION PER HR: HCPCS

## 2023-08-16 PROCEDURE — 97535 SELF CARE MNGMENT TRAINING: CPT

## 2023-08-16 PROCEDURE — 97166 OT EVAL MOD COMPLEX 45 MIN: CPT

## 2023-08-16 PROCEDURE — 92610 EVALUATE SWALLOWING FUNCTION: CPT

## 2023-08-16 PROCEDURE — 99900035 HC TECH TIME PER 15 MIN (STAT)

## 2023-08-16 PROCEDURE — 97163 PT EVAL HIGH COMPLEX 45 MIN: CPT

## 2023-08-16 PROCEDURE — 97530 THERAPEUTIC ACTIVITIES: CPT

## 2023-08-16 PROCEDURE — 96366 THER/PROPH/DIAG IV INF ADDON: CPT

## 2023-08-16 PROCEDURE — 99900031 HC PATIENT EDUCATION (STAT)

## 2023-08-16 PROCEDURE — 25000003 PHARM REV CODE 250: Performed by: INTERNAL MEDICINE

## 2023-08-16 RX ORDER — TIOTROPIUM BROMIDE 18 UG/1
18 CAPSULE ORAL; RESPIRATORY (INHALATION) DAILY
Qty: 30 CAPSULE | Refills: 0 | Status: SHIPPED | OUTPATIENT
Start: 2023-08-16 | End: 2023-09-15

## 2023-08-16 RX ORDER — LEVOFLOXACIN 500 MG/1
500 TABLET, FILM COATED ORAL DAILY
Qty: 5 TABLET | Refills: 0 | Status: SHIPPED | OUTPATIENT
Start: 2023-08-16 | End: 2023-08-21

## 2023-08-16 RX ORDER — METOPROLOL SUCCINATE 50 MG/1
50 TABLET, EXTENDED RELEASE ORAL DAILY
Qty: 30 TABLET | Refills: 0 | Status: ON HOLD | OUTPATIENT
Start: 2023-08-16 | End: 2023-12-19 | Stop reason: HOSPADM

## 2023-08-16 RX ADMIN — LEVETIRACETAM 1000 MG: 10 INJECTION INTRAVENOUS at 10:08

## 2023-08-16 RX ADMIN — AMLODIPINE BESYLATE 10 MG: 5 TABLET ORAL at 10:08

## 2023-08-16 RX ADMIN — LOSARTAN POTASSIUM 100 MG: 50 TABLET, FILM COATED ORAL at 10:08

## 2023-08-16 RX ADMIN — ASPIRIN 81 MG 81 MG: 81 TABLET ORAL at 10:08

## 2023-08-16 RX ADMIN — CLONIDINE HYDROCHLORIDE 0.1 MG: 0.1 TABLET ORAL at 02:08

## 2023-08-16 RX ADMIN — LEVOFLOXACIN 500 MG: 5 INJECTION, SOLUTION INTRAVENOUS at 10:08

## 2023-08-16 RX ADMIN — CLONIDINE HYDROCHLORIDE 0.1 MG: 0.1 TABLET ORAL at 10:08

## 2023-08-16 RX ADMIN — LATANOPROST 1 DROP: 50 SOLUTION OPHTHALMIC at 10:08

## 2023-08-16 NOTE — PLAN OF CARE
Received call from Patricia Mcgill at [facility] in  [phone] stating they obtained auth from [facility] and can accept the patient today if she is medically cleared. Patricia requests a list of inhalers patient will continue on so patient's daughter Kenia can have them filled before she moves to . Patricia also requests all follow up appointments scheduled in the [place] area be deleted from the discharge and orders to obtain providers for follow up in the [place] are be added to the discharge because patient cannot be transported back and forth from [place] to [place]. Above information relayed to Dr. Berry via secure chat.  Will continue to follow and update as appropriate.

## 2023-08-16 NOTE — CARE UPDATE
08/16/23 0856   Patient Assessment/Suction   Level of Consciousness (AVPU) responds to voice   Respiratory Effort Normal   Expansion/Accessory Muscles/Retractions no use of accessory muscles   All Lung Fields Breath Sounds diminished   Rhythm/Pattern, Respiratory unlabored   PRE-TX-O2   Device (Oxygen Therapy) room air   SpO2 99 %   Pulse Oximetry Type Intermittent   $ Pulse Oximetry - Single Charge Pulse Oximetry - Single   Pulse (!) 53   Resp 16   Aerosol Therapy   $ Aerosol Therapy Charges PRN treatment not required   Education   $ Education Bronchodilator;15 min

## 2023-08-16 NOTE — PROGRESS NOTES
[facility] Medicine  Progress Note    Patient Name: Steff Johnson  MRN: [id number]  Patient Class: OP- Observation   Admission Date: 8/11/2023  Length of Stay: 0 days  Attending Physician: Roger Berry MD  Primary Care Provider: Cristina Ren MD        Subjective:     Principal Problem:Problem related to discharge planning        HPI:     63 y.o.  female With PMH of stroke with expressive aphasia, right upper extremity paralysis, HTN, DM, COPD.    who presented with worsening AMS and work up was done and found new small stroke and later DC to home with HH as per significant other's wish who is living with patient for many years.    Today daughter from [place] brought back since she feels like her mother is not safe at home and feels like significant other of patient's mother is not giving adequate care and wanted to be placed .    Found WBC in the urine and admitted with possible UTI . Case Mx consulted.  Daughter is also interested in hospice placement too. Last time she was started on keppra for seizure prevention post stroke .    On exam , no CP , no SOB , non communicative  and no family member around .               Overview/Hospital Course:  08/13  Medically stable  Awaiting SNF placement    08/14  Medically stable  Awaiting SNF placement    08/15  Developed fever overnight  CXR WNL  Awaiting culture results      Interval History:       Review of Systems   Unable to perform ROS: Patient nonverbal     Objective:     Vital Signs (Most Recent):  Temp: 99.3 °F (37.4 °C) (08/15/23 1629)  Pulse: (!) 52 (08/15/23 1629)  Resp: 18 (08/15/23 1629)  BP: (!) 149/67 (08/15/23 1629)  SpO2: 96 % (08/15/23 1629) Vital Signs (24h Range):  Temp:  [98.5 °F (36.9 °C)-101 °F (38.3 °C)] 99.3 °F (37.4 °C)  Pulse:  [52-72] 52  Resp:  [16-18] 18  SpO2:  [94 %-99 %] 96 %  BP: (141-173)/(67-91) 149/67     Weight: 55.8 kg (123 lb 0.3 oz)  Body mass index is 24.85 kg/m².    Intake/Output Summary (Last 24 hours) at  8/15/2023 1932  Last data filed at 8/15/2023 1115  Gross per 24 hour   Intake 0 ml   Output 740 ml   Net -740 ml         Physical Exam  Vitals and nursing note reviewed.   Constitutional:       General: She is not in acute distress.     Appearance: Normal appearance.   HENT:      Head: Atraumatic.      Right Ear: External ear normal.      Left Ear: External ear normal.      Nose: Nose normal.      Mouth/Throat:      Mouth: Mucous membranes are moist.   Cardiovascular:      Rate and Rhythm: Normal rate.   Pulmonary:      Effort: Pulmonary effort is normal.   Musculoskeletal:         General: Normal range of motion.      Cervical back: Normal range of motion.   Skin:     General: Skin is warm.   Neurological:      Mental Status: Mental status is at baseline.               Significant Imaging: I have reviewed all pertinent imaging results/findings within the past 24 hours.      Assessment/Plan:      * Problem related to discharge planning  Pt was hospitalized recently  Plan was to discharge pt to SNF  Pts significant other opted for discharge to home with HH and pt was discharged  Pts daughters brought back pt to hospital within 24 hrsl and wants SNF placement !  They are ok with Palliative care consultation too   Pt medically stable and awaiting SNF placement   Ideally should be under hospice care       CVA (cerebral vascular accident)  Recent CVA  Tiny subcortical white matter hyperintensity in the left parietal lobe near the vertex on diffusion-weighted imaging, suggesting small acute or subacute ischemic insult,Per MRI Done recently  Pt has history of  stroke with expressive aphasia, right upper extremity paralysis,and has cognitive defecits      Fever  Developed fever on 08/14 night  Culture results pending  CXR WNL  Maintain levofloxacin      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  Stable       Type 2 diabetes mellitus with diabetic nephropathy, HbA1c 8.9%  Maintain present regime    COPD (chronic obstructive  pulmonary disease)  Stable       Hypertension associated with diabetes  Maintain present Rx      VTE Risk Mitigation (From admission, onward)         Ordered     enoxaparin injection 40 mg  Daily         08/12/23 0413     IP VTE HIGH RISK PATIENT  Once         08/12/23 0358     Place sequential compression device  Until discontinued         08/12/23 0358                Discharge Planning   ALEX: 8/17/2023     Code Status: DNR   Is the patient medically ready for discharge?:     Reason for patient still in hospital (select all that apply): Pending disposition  Discharge Plan A: Skilled Nursing Facility   Discharge Delays: (!) Post-Acute Set-up              Roger Berry MD  Department of Hospital Medicine   [facility]

## 2023-08-16 NOTE — PT/OT/SLP EVAL
Physical Therapy Evaluation    Patient Name:  Steff Johnson   MRN:  [id number]    Recommendations:     Discharge Recommendations:   SNF with possible transition to residential care, Palliative care physician has spoken to pt's family for other options  Discharge Equipment Recommendations: to be determined by next level of care   Barriers to discharge: Decreased caregiver support, increased caregiver buren of care    Assessment:     Steff Johnson is a 63 y.o. female admitted with a medical diagnosis of Problem related to discharge planning.  She presents with the following impairments/functional limitations: weakness, impaired endurance, impaired self care skills, impaired functional mobility, gait instability, impaired balance, impaired cognition, decreased coordination, decreased upper extremity function, decreased lower extremity function, decreased safety awareness, abnormal tone, decreased ROM .    Pt presented with HOB slightly elevated. She was nonverbal but intermittently followed cueing for closing her eyes, opening her mouth, and nodding in the negative and affirmative ,requiring extra time to respond. Pt's R side appeared weaker due  to previous CVA. She has  increased tone to extremities.    Rehab Prognosis:  guarded ; patient would benefit from acute skilled PT services to address these deficits and reach maximum level of function.    Recent Surgery: * No surgery found *      Plan:     During this hospitalization, patient to be seen 3 x/week to address the identified rehab impairments via therapeutic activities, therapeutic exercises, neuromuscular re-education and progress toward the following goals:    Plan of Care Expires:  09/16/23    Subjective     Chief Complaint: None   Patient/Family Comments/goals: Unable to express  Pain/Comfort:       Patients cultural, spiritual, Bahai conflicts given the current situation:      Living Environment:  Pt lives in a mobile home with her significant   other.  Prior to admission, patients level of function  was most probably Max A /Max X2.  Equipment used at home:   Upon discharge, patient will have assistance from facility.    Objective:     Communicated with nurse prior to session.  Patient found HOB elevated with telemetry, PureWick, peripheral IV  upon PT entry to room.    General Precautions: Standard, aspiration  Orthopedic Precautions:    Braces:    Respiratory Status: Room air    Exams:  RLE ROM: WFL for PROM  RLE Strength: 2/5  LLE ROM: WFL  LLE Strength: 2+/5    Functional Mobility:  Bed Mobility:     Supine to Sit: maximal assistance and of 2 persons  Sit to Supine: maximal assistance and of 2 persons  Balance: Max A for sitting EOB      AM-PAC 6 CLICK MOBILITY  Total Score:6       Treatment & Education:  PT eval  completed. Pt was shown how to use call light .    Patient left HOB elevated with all lines intact, call button in reach, and bed alarm on.    GOALS:   Multidisciplinary Problems       Physical Therapy Goals          Problem: Physical Therapy    Goal Priority Disciplines Outcome Goal Variances Interventions   Physical Therapy Goal     PT, PT/OT      Description: Goals to be met by: 2023     Patient will increase functional independence with mobility by performin. Supine to sit with Maximum Assistance  2. Rolling to Left  and right with Maximum Assistance.  3. Sitting at edge of bed x10  minutes with Minimal Assistance                         History:     Past Medical History:   Diagnosis Date    Arthritis     Complete tear of left rotator cuff 2017    COPD (chronic obstructive pulmonary disease)     COVID-19 infection 2021    Diabetes mellitus     H/o Cerebrovascular accident (CVA) of left pontine structure 2019    Hypertension     Personal history of colonic polyps     Stroke     Wears glasses        Past Surgical History:   Procedure Laterality Date    COLONOSCOPY N/A 2017    Procedure: COLONOSCOPY;   Surgeon: Jared Antonio MD;  Location: [facility];  Service: Endoscopy;  Laterality: N/A;    HYSTERECTOMY      OOPHORECTOMY      SHOULDER SURGERY      R    TRANSESOPHAGEAL ECHOCARDIOGRAM WITH POSSIBLE CARDIOVERSION (GT W/ POSS CARDIOVERSION) N/A 5/4/2023    Procedure: Transesophageal echo (GT) intra-procedure log documentation;  Surgeon: Blade Phillip MD;  Location: [facility] CATH/EP LAB;  Service: Cardiology;  Laterality: N/A;       Time Tracking:     PT Received On: 08/16/23  PT Start Time: 1055     PT Stop Time: 1108  PT Total Time (min): 13 min     Billable Minutes: Evaluation 5 minutes and Therapeutic Activity 8 minutes      08/16/2023

## 2023-08-16 NOTE — CARE UPDATE
08/15/23 2014   Patient Assessment/Suction   Level of Consciousness (AVPU) alert   Respiratory Effort Normal   PRE-TX-O2   Device (Oxygen Therapy) room air   SpO2 99 %   Pulse Oximetry Type Intermittent   $ Pulse Oximetry - Multiple Charge Pulse Oximetry - Multiple   Pulse 60   Resp 19   Aerosol Therapy   $ Aerosol Therapy Charges PRN treatment not required   Education   $ Education 15 min   Respiratory Evaluation   $ Care Plan Tech Time 15 min

## 2023-08-16 NOTE — PT/OT/SLP EVAL
"Speech Language Pathology Evaluation  Cognitive/Bedside Swallow    Patient Name:  Steff Johnson   MRN:  [id number]  Admitting Diagnosis: Problem related to discharge planning    Recommendations:                  General Recommendations:  Follow-up not indicated and dietician consult for decreased PO intake ; may benefit from psych consult for possible depression  Diet recommendations:  Puree Diet - IDDSI Level 4, Thin liquids - IDDSI Level 0   Aspiration Precautions: 1 bite/sip at a time, Assistance with meals, Check for pocketing/oral residue, HOB to 90 degrees, and Meds crushed in puree   General Precautions: Standard,    Communication strategies:  yes/no questions only    Assessment:     Steff Johnson is a 63 y.o. female with an SLP diagnosis of Cognitive-Linguistic Impairment.  She presents with continued decreased participation, limited expressive communication, and oral holding of food. Pt w/o overt s/s oropharyngeal dysphagia or aspiration; however, pt is demonstrating slowed oral prep. Rec continue pureed diet and thin liquids w/ recs for dietician consult due to decreased intake. Cognitive-linguistic status unchanged from previous admit; pt demonstrating understanding of basic information and intermittently, personal preferences.    History:   Per H&P:  "HPI:    63 y.o.  female With PMH of stroke with expressive aphasia, right upper extremity paralysis, HTN, DM, COPD.    who presented with worsening AMS and work up was done and found new small stroke and later DC to home with HH as per significant other's wish who is living with patient for many years.     Today daughter from [place] brought back since she feels like her mother is not safe at home and feels like significant other of patient's mother is not giving adequate care and wanted to be placed .     Found WBC in the urine and admitted with possible UTI . Case Mx consulted.  Daughter is also interested in hospice placement too. Last time she was " "started on keppra for seizure prevention post stroke .     On exam , no CP , no SOB , non communicative  and no family member around ."  Past Medical History:   Diagnosis Date    Arthritis     Complete tear of left rotator cuff 11/09/2017    COPD (chronic obstructive pulmonary disease)     COVID-19 infection 07/02/2021    Diabetes mellitus     H/o Cerebrovascular accident (CVA) of left pontine structure 12/12/2019    Hypertension     Personal history of colonic polyps     Stroke     Wears glasses        Past Surgical History:   Procedure Laterality Date    COLONOSCOPY N/A 4/11/2017    Procedure: COLONOSCOPY;  Surgeon: Jared Antonio MD;  Location: [facility] ENDO;  Service: Endoscopy;  Laterality: N/A;    HYSTERECTOMY      OOPHORECTOMY      SHOULDER SURGERY      R    TRANSESOPHAGEAL ECHOCARDIOGRAM WITH POSSIBLE CARDIOVERSION (GT W/ POSS CARDIOVERSION) N/A 5/4/2023    Procedure: Transesophageal echo (GT) intra-procedure log documentation;  Surgeon: Blade Phillip MD;  Location: [facility] CATH/EP LAB;  Service: Cardiology;  Laterality: N/A;       Social History: Patient lives with significant other; however, daughters not wanting her to live there after last admit.    Prior Intubation HX:  none this admit    Modified Barium Swallow: none found in epic or reported by pt    Chest X-Rays:   Imaging Results    None       Prior diet: Puree, thin.    Occupation/hobbies/homemaking: none reported.    Subjective     Pt awake, alert, smiling, and agreeable to ST eval. Nurse at bedside.  Patient goals: none stated     Pain/Comfort:       Respiratory Status: Room air    Objective:     Cognitive Status:    Arousal/Alertness Appropriate response to stimuli  Attention No obvious deficits observed    Orientation Person and Place      Receptive Language:   Comprehension:      Questions Simple yes/no WFL  Open ended questions 40% accuracy and 40% answered; remaining questions unanswered  Commands  One step WFL  two step basic commands WFL " when given a visual model   Commands intermittently un-followed; pt was able to demonstrate ability to complete prompted directives when she was willing to  Understanding of simple concepts demonstrated to be WFL; however, pt appears to be unmotivated and/or unwilling to participate selectively, as participation was noted to decrease 8 minutes into session    Pragmatics:    Initially pt w/ increased facial expression; however, as session progressed, pt w/ retracted disposition. When asked if she was sad/unhappy and if she needed to talk to someone, pt looked down and facial expression changed to slight frown    Expressive Language:  Verbal:    Initiation : pt was able to respond to greeting verbally and 2/5 open-ended questions w/ 1 word responses  Head nods and shaking observed when asked yes/no questions  Nonverbal:   Facial Expression : initially observed to be characterized by increased expression; however, as evaluation progressed, decreased expression noted      Motor Speech:  Unable to assess due to decreased verbal output; pt continues w/ hyponasality and decreased vocal volume.   No groping noted  Light articulatory contact    Voice:   Low volume, hyponasal    Visual-Spatial:  Limited evaluation; however, pt scanning both L and R side of visual field    Reading:   Unable to assess due to cognitive status and decreased participation      Written Expression:   Unable to assess due to cognitive status and decreased participation    Oral Musculature Evaluation  Oral Musculature: facial asymmetry present (Left side)  Dentition: present and adequate  Secretion Management: adequate  Mucosal Quality: adequate  Mandibular Strength and Mobility: WFL  Oral Labial Strength and Mobility: impaired retraction, impaired pursing, functional seal  Lingual Strength and Mobility: other (see comments), functional lateral movement (unable to assess strength due to decreased protrusion)  Velar Elevation: other (see comments)  (unable to visualize)  Buccal Strength and Mobility: WFL  Volitional Cough: unable to elicit  Volitional Swallow: unable to elicit  Voice Prior to PO Intake: hyponasal; clear    Bedside Swallow Eval:   Pt was noted to accept food and liquid when presented; improved ability to suck from straw from last admit. Liquid trials resulting in timely oral transport and pharyngeal swallow initiation; however, puree and solid trials resulting in oral holding. Pt unwilling to open mouth for SLP to observe oral clearance; pt clenching jaw in response to prompts. Pt was observed to be refusing PO intake. Pt's nurse reporting pt ate when daughter was present and took meds crushed in puree earlier today w/o problem.  Consistencies Assessed:  Thin liquids ice chip, water via cup edge and straw sip  Puree tsp bites applesauce  Solids bites of cracker      Oral Phase:   Decreased closure around utensil- spoon  Prolonged/slowed mastication  Lingual residue w/ cracker  Poor oral acceptance- spoon  Slow oral transit time    Pharyngeal Phase:   no overt clinical signs/symptoms of aspiration  no overt clinical signs/symptoms of pharyngeal dysphagia    Compensatory Strategies  Liquid wash- cleared majority of cracker    Treatment: Pt educated re purpose of evaluation, role of SLP, results of evaluation, and recs. Pt expressed understanding of recs via head nod yes. Recs shared w/ nursing.      Goals:   Multidisciplinary Problems       SLP Goals       Not on file                    Plan:     Patient to be seen:      Plan of Care expires:     Plan of Care reviewed with:  patient, other (see comments) (nursing)   SLP Follow-Up:          Discharge recommendations:  Discharge Facility/Level of Care Needs: nursing facility, skilled   Barriers to Discharge:  None    Time Tracking:     SLP Treatment Date:   08/16/23  Speech Start Time:  1027  Speech Stop Time:  1054     Speech Total Time (min):  27 min    Billable Minutes: Eval  Cognitive-communication: 18 min  and Eval Swallow and Oral Function 19 min    08/16/2023

## 2023-08-16 NOTE — NURSING
1513 Pt lying in bed, removed IV for discharge, catheter intact, Pt tolerated well, no signs of discomfort, Pt information transferred with Pt, safety checks performed.

## 2023-08-16 NOTE — SUBJECTIVE & OBJECTIVE
Interval History: See HPI    Past Medical History:   Diagnosis Date    Arthritis     Complete tear of left rotator cuff 11/09/2017    COPD (chronic obstructive pulmonary disease)     COVID-19 infection 07/02/2021    Diabetes mellitus     H/o Cerebrovascular accident (CVA) of left pontine structure 12/12/2019    Hypertension     Personal history of colonic polyps     Stroke     Wears glasses        Past Surgical History:   Procedure Laterality Date    COLONOSCOPY N/A 4/11/2017    Procedure: COLONOSCOPY;  Surgeon: Jared Antonio MD;  Location: [facility];  Service: Endoscopy;  Laterality: N/A;    HYSTERECTOMY      OOPHORECTOMY      SHOULDER SURGERY      R    TRANSESOPHAGEAL ECHOCARDIOGRAM WITH POSSIBLE CARDIOVERSION (GT W/ POSS CARDIOVERSION) N/A 5/4/2023    Procedure: Transesophageal echo (GT) intra-procedure log documentation;  Surgeon: Blade Phillip MD;  Location: [facility] CATH/EP LAB;  Service: Cardiology;  Laterality: N/A;       Review of patient's allergies indicates:  No Known Allergies    Medications:  Continuous Infusions:  Scheduled Meds:   amLODIPine  10 mg Oral Daily    aspirin  81 mg Oral Daily    atorvastatin  80 mg Oral QHS    cloNIDine  0.1 mg Oral TID    enoxparin  40 mg Subcutaneous Daily    EScitalopram oxalate  10 mg Oral QHS    insulin detemir U-100  13 Units Subcutaneous QHS    latanoprost  1 drop Both Eyes Daily    levETIRAcetam (Keppra) IV (PEDS and ADULTS)  1,000 mg Intravenous Q12H    levoFLOXacin  500 mg Intravenous Q24H    losartan  100 mg Oral Daily    metoprolol succinate  50 mg Oral Daily     PRN Meds:acetaminophen, bisacodyL, dextrose 50%, dextrose 50%, glucagon (human recombinant), glucose, glucose, ipratropium, naloxone, ondansetron, sodium chloride 0.9%    Family History       Problem Relation (Age of Onset)    Anemia Daughter    Asthma Mother    Diabetes Mother, Father, Brother    Hypertension Mother, Brother          Tobacco Use    Smoking status: Never    Smokeless tobacco:  Never   Substance and Sexual Activity    Alcohol use: No    Drug use: No    Sexual activity: Not Currently       Review of Systems   Unable to perform ROS: Patient nonverbal     Objective:     Vital Signs (Most Recent):  Temp: 97.8 °F (36.6 °C) (08/16/23 0725)  Pulse: (!) 53 (08/16/23 0725)  Resp: 17 (08/16/23 0725)  BP: (!) 156/76 (08/16/23 0725)  SpO2: 97 % (08/16/23 0725) Vital Signs (24h Range):  Temp:  [97.8 °F (36.6 °C)-99.3 °F (37.4 °C)] 97.8 °F (36.6 °C)  Pulse:  [52-66] 53  Resp:  [16-19] 17  SpO2:  [95 %-99 %] 97 %  BP: (122-168)/(67-80) 156/76     Weight: 55.8 kg (123 lb 0.3 oz)  Body mass index is 24.85 kg/m².       Physical Exam  Vitals reviewed.   Constitutional:       General: She is not in acute distress.     Appearance: She is ill-appearing. She is not toxic-appearing.   HENT:      Head: Normocephalic and atraumatic.      Right Ear: External ear normal.      Left Ear: External ear normal.      Nose: Nose normal. No congestion.      Mouth/Throat:      Mouth: Mucous membranes are moist.      Pharynx: No oropharyngeal exudate.   Eyes:      General:         Right eye: No discharge.         Left eye: No discharge.   Cardiovascular:      Rate and Rhythm: Normal rate and regular rhythm.      Pulses: Normal pulses.   Pulmonary:      Effort: Pulmonary effort is normal. No respiratory distress.   Abdominal:      General: There is no distension.      Palpations: Abdomen is soft.      Tenderness: There is no abdominal tenderness.   Skin:     General: Skin is warm.   Neurological:      Comments: Minimally responsive.  Opens eyes to voice.   Psychiatric:      Comments: Non verbal            Review of Symptoms      Symptom Assessment (ESAS 0-10 Scale)  Unable to complete assessment due to Mental status change         Pain Assessment in Advanced Demential Scale (PAINAD)   Breathing - Independent of vocalization:  0  Negative vocalization:  0  Facial expression:  0  Body language:  0  Consolability:  0  Total:   "0    Performance Status:  20    Living Arrangements:  Lives with family and Lives in home    Psychosocial/Cultural:   See Palliative Psychosocial Note: No  **Primary  to Follow**  Palliative Care  Consult: No        Advance Care Planning   Advance Directives:   Do Not Resuscitate Status: Yes      Decision Making:  Family answered questions and Patient unable to communicate due to disease severity/cognitive impairment  Goals of Care: The family endorses that what is most important right now is to focus on avoiding the hospital, remaining as independent as possible, symptom/pain control, and improvement in condition but with limits to invasive therapies    Accordingly, we have decided that the best plan to meet the patient's goals includes continuing with treatment and enrolling in hospice if her condition does not improve with rehab.         Significant Labs: BMP: No results for input(s): "GLU", "NA", "K", "CL", "CO2", "BUN", "CREATININE", "CALCIUM", "MG" in the last 48 hours.  CBC: No results for input(s): "WBC", "HGB", "HCT", "PLT" in the last 48 hours.  CBC:   Recent Labs   Lab 08/12/23  0126   WBC 12.23   HGB 9.7*   HCT 27.0*   MCV 79*        BMP:  No results for input(s): "GLU", "NA", "K", "CL", "CO2", "BUN", "CREATININE", "CALCIUM", "MG" in the last 24 hours.  LFT:  Lab Results   Component Value Date    AST 15 08/12/2023    ALKPHOS 82 08/12/2023    BILITOT 0.6 08/12/2023     Albumin:   Albumin   Date Value Ref Range Status   08/12/2023 3.1 (L) 3.5 - 5.2 g/dL Final     Protein:   Total Protein   Date Value Ref Range Status   08/12/2023 6.5 6.0 - 8.4 g/dL Final     Lactic acid:   Lab Results   Component Value Date    LACTATE 1.7 08/04/2023    LACTATE 1.4 06/15/2023       Significant Imaging: I have reviewed all pertinent imaging results/findings within the past 24 hours.    "

## 2023-08-16 NOTE — PLAN OF CARE
Problem: Infection  Goal: Absence of Infection Signs and Symptoms  Outcome: Adequate for Care Transition     Problem: Adult Inpatient Plan of Care  Goal: Plan of Care Review  Outcome: Adequate for Care Transition  Goal: Patient-Specific Goal (Individualized)  Outcome: Adequate for Care Transition  Goal: Absence of Hospital-Acquired Illness or Injury  Outcome: Adequate for Care Transition  Goal: Optimal Comfort and Wellbeing  Outcome: Adequate for Care Transition  Goal: Readiness for Transition of Care  Outcome: Adequate for Care Transition     Problem: Diabetes Comorbidity  Goal: Blood Glucose Level Within Targeted Range  Outcome: Adequate for Care Transition     Problem: Fall Injury Risk  Goal: Absence of Fall and Fall-Related Injury  Outcome: Adequate for Care Transition     Problem: Skin Injury Risk Increased  Goal: Skin Health and Integrity  Outcome: Adequate for Care Transition     Problem: Coping Ineffective  Goal: Effective Coping  Outcome: Adequate for Care Transition

## 2023-08-16 NOTE — PT/OT/SLP EVAL
Occupational Therapy   Evaluation    Name: Steff Johnson  MRN: [id number]  Admitting Diagnosis: Problem related to discharge planning  Recent Surgery: * No surgery found *      Recommendations:     Discharge Recommendations: nursing facility, skilled  Discharge Equipment Recommendations:  (TBD at next level of care)  Barriers to discharge:   increased assistance needed for care    Assessment:     Steff Johnson is a 63 y.o. female with a medical diagnosis of Problem related to discharge planning.  She presents with BUR hypertonicity and aphasia. Performance deficits affecting function: weakness, impaired endurance, impaired self care skills, impaired functional mobility, gait instability, decreased lower extremity function, decreased upper extremity function, impaired skin, decreased ROM, impaired cardiopulmonary response to activity, impaired coordination.      Rehab Prognosis: Fair; patient would benefit from acute skilled OT services to address these deficits and reach maximum level of function.       Plan:     Patient to be seen 3 x/week to address the above listed problems via self-care/home management, therapeutic activities, therapeutic exercises  Plan of Care Expires: 09/16/23  Plan of Care Reviewed with: patient    Subjective     Chief Complaint: did not indicate  Patient/Family Comments/goals: not known    Occupational Profile:  Living Environment: recent return to home; caregiver will be unable to provide care needed   Previous level of function: received assistance with ADLs and utilizing wheelchair for mobility  Equipment Used at Home: walker, rolling  Assistance upon Discharge: family is available for assistance.     Pain/Comfort:  Pain Rating 1:  (did not rate)  Location - Side 1: Right  Location 1: arm    Patients cultural, spiritual, Hindu conflicts given the current situation: no    Objective:     Communicated with: nurse prior to session.  Patient found HOB elevated with telemetry, Angela  blood pressure cuff, peripheral IV, pulse ox (continuous) upon OT entry to room.    General Precautions: Standard, aspiration  Orthopedic Precautions: N/A  Braces: N/A  Respiratory Status: Room air    Occupational Performance:    Bed Mobility:    Patient completed Rolling/Turning to Left with  maximal assistance  Patient completed Rolling/Turning to Right with maximal assistance      Activities of Daily Living:  Grooming: moderate assistance for washing face and attempted swabbing of mouth    Cognitive/Visual Perceptual:  Cognitive/Psychosocial Skills:     -       Oriented to: Person   -       Follows Commands/attention:Follows one-step commands  -       Communication: expressive aphasia  -       Memory: Impaired STM and Impaired LTM  -       Safety awareness/insight to disability: impaired   -       Mood/Affect/Coping skills/emotional control: Flat affect    Physical Exam:  Upper Extremity Range of Motion:     -       Right Upper Extremity: unable to obtain full range of motion secondary to hypertonicity  -       Left Upper Extremity: Increased tone for shoulder flexion and abduction   Upper Extremity Strength:    -       Right Upper Extremity: 1/5 per MMT  -       Left Upper Extremity: 2-/5 per MMT   Strength:    -       Right Upper Extremity: poor  -       Left Upper Extremity: poor  Fine Motor Coordination:    -       Impaired  bilaterally     AMPAC 6 Click ADL:  AMPAC Total Score: 6    Treatment & Education:  Patient was educated on role of OT/POC, importance of activity and getting out of bed.    Patient left HOB elevated with all lines intact, call button in reach, and bed alarm on    GOALS:   Multidisciplinary Problems       Occupational Therapy Goals          Problem: Occupational Therapy    Goal Priority Disciplines Outcome Interventions   Occupational Therapy Goal     OT, PT/OT     Description: Goals to be met by: 9/16/2023     Patient will increase functional independence with ADLs by  performing:    UE Dressing with Moderate Assistance.  LE Dressing with Moderate Assistance.  Grooming while EOB with Minimal Assistance.  Toileting from toilet with Moderate Assistance for hygiene and clothing management.                          History:     Past Medical History:   Diagnosis Date    Arthritis     Complete tear of left rotator cuff 11/09/2017    COPD (chronic obstructive pulmonary disease)     COVID-19 infection 07/02/2021    Diabetes mellitus     H/o Cerebrovascular accident (CVA) of left pontine structure 12/12/2019    Hypertension     Personal history of colonic polyps     Stroke     Wears glasses          Past Surgical History:   Procedure Laterality Date    COLONOSCOPY N/A 4/11/2017    Procedure: COLONOSCOPY;  Surgeon: Jared Antonio MD;  Location: [facility];  Service: Endoscopy;  Laterality: N/A;    HYSTERECTOMY      OOPHORECTOMY      SHOULDER SURGERY      R    TRANSESOPHAGEAL ECHOCARDIOGRAM WITH POSSIBLE CARDIOVERSION (GT W/ POSS CARDIOVERSION) N/A 5/4/2023    Procedure: Transesophageal echo (GT) intra-procedure log documentation;  Surgeon: Blade Phillip MD;  Location: [facility] CATH/EP LAB;  Service: Cardiology;  Laterality: N/A;       Time Tracking:     OT Date of Treatment: 08/16/23  OT Start Time: 0924  OT Stop Time: 0944  OT Total Time (min): 20 min    Billable Minutes:Evaluation 5  ADL/self care: 15    8/16/2023

## 2023-08-16 NOTE — PLAN OF CARE
Clinical info including d/c order and AVS rightfaxed to Patricia at [facility] [phone] per her request. After reviewing info Patricia will contact us with phone number to call report to.     Spoke with patient's daughter Leonie who states she will come to [facility] and  patient's medications from [facility] outpatient pharmacy.     [phone] received call from Patricia asking for report to be called to [phone], states they have received all requested info and are ready to accept patient.  Above info relayed to RADHA Tovar via secure chat.

## 2023-08-16 NOTE — PROGRESS NOTES
"[facility]  Adult Nutrition   Progress Note (Initial Assessment)     SUMMARY     Recommendations  Recommendation/Intervention: 1. Continue diet per SLP. 2. Recommend Ensure +HP milkshakes TID. 3. RD to monitor intake, tolerance of diet and change in status prn.  Goals: 1. Intake to be >/= 75% EEN / EPN. 2. Labs trend to target range.  Nutrition Goal Status: new  Communication of RD Recs: reviewed with RN    Dietitian Rounds Brief  RD screen for LOS. Patient dc'd 4 pm 8/11; re-admit just before MN. H&P time 4am 8/12. Was assessed and followed ; last note 8/11--added Ensure +HP milkshake TID. Last BM 8/15.  Pt made hospice per palliative care MD; has dc order per RN.   Intake poor. Consider an appetite stimulant?    Diet order:   Current Diet Order: dysphagia pureed IDDSI level 4                 Evaluation of Received Nutrient/Fluid Intake  Energy Calories Required: not meeting needs  Protein Required: not meeting needs  Fluid Required: not meeting needs  Tolerance: tolerating     % Intake of Estimated Energy Needs: 0 - 25 %  % Meal Intake: 0 - 25 %      Intake/Output Summary (Last 24 hours) at 8/16/2023 1522  Last data filed at 8/16/2023 0954  Gross per 24 hour   Intake 100 ml   Output 700 ml   Net -600 ml        Anthropometrics  Temp: 97.6 °F (36.4 °C)  Height Method: Stated  Height: 4' 11" (149.9 cm)  Height (inches): 59 in  Weight Method: Bed Scale  Weight: 55.8 kg (123 lb 0.3 oz)  Weight (lb): 123.02 lb  Ideal Body Weight (IBW), Female: 95 lb  % Ideal Body Weight, Female (lb): 133.68 %  BMI (Calculated): 24.8  BMI Grade: 18.5-24.9 - normal       Estimated/Assessed Needs  Weight Used For Calorie Calculations: 55.8 kg (123 lb 0.3 oz)  Energy Calorie Requirements (kcal): 3896-3641 kcal/day(30-35 kcal/kg)  Energy Need Method: Kcal/kg  Protein Requirements: 67-84 gm/day (1.2-1.5 gm/kg)  Weight Used For Protein Calculations: 55.8 kg (123 lb 0.3 oz)     Estimated Fluid Requirement Method: RDA Method  RDA " Method (mL): 0732       Reason for Assessment  Reason For Assessment: length of stay  Diagnosis: cardiac disease  Relevant Medical History: dysphagia, HTN, stroke, COPD  Interdisciplinary Rounds: did not attend    Nutrition/Diet History  Spiritual, Cultural Beliefs, Gnosticism Practices, Values that Affect Care: no  Food Allergies: NKFA  Factors Affecting Nutritional Intake: impaired cognitive status/motor control, chewing difficulties/inability to chew food, difficulty/impaired swallowing    Nutrition Risk Screen  Nutrition Risk Screen: difficulty chewing/swallowing     MST Score: 0  Have you recently lost weight without trying?: No  Weight loss score: 0  Have you been eating poorly because of a decreased appetite?: No  Appetite score: 0       Weight History:  Wt Readings from Last 5 Encounters:   08/12/23 55.8 kg (123 lb 0.3 oz)   08/11/23 58 kg (127 lb 13.9 oz)   07/03/23 59 kg (130 lb)   06/27/23 50.4 kg (111 lb)   06/16/23 59.9 kg (132 lb 0.9 oz)        Lab/Procedures/Meds: Pertinent Labs/Meds Reviewed    Medications:Pertinent Medications Reviewed  Scheduled Meds:   amLODIPine  10 mg Oral Daily    aspirin  81 mg Oral Daily    atorvastatin  80 mg Oral QHS    cloNIDine  0.1 mg Oral TID    enoxparin  40 mg Subcutaneous Daily    EScitalopram oxalate  10 mg Oral QHS    insulin detemir U-100  13 Units Subcutaneous QHS    latanoprost  1 drop Both Eyes Daily    levETIRAcetam (Keppra) IV (PEDS and ADULTS)  1,000 mg Intravenous Q12H    levoFLOXacin  500 mg Intravenous Q24H    losartan  100 mg Oral Daily    metoprolol succinate  50 mg Oral Daily     Continuous Infusions:  PRN Meds:.acetaminophen, bisacodyL, dextrose 50%, dextrose 50%, glucagon (human recombinant), glucose, glucose, naloxone, ondansetron, sodium chloride 0.9%    Labs: Pertinent Labs Reviewed  Clinical Chemistry:  Recent Labs   Lab 08/12/23  0126      K 3.7      CO2 23      BUN 23   CREATININE 0.8   CALCIUM 8.6*   PROT 6.5   ALBUMIN 3.1*  "  BILITOT 0.6   ALKPHOS 82   AST 15   ALT 31   ANIONGAP 7*     CBC:   Recent Labs   Lab 08/12/23  0126   WBC 12.23   RBC 3.41*   HGB 9.7*   HCT 27.0*      MCV 79*   MCH 28.4   MCHC 35.9     Lipid Panel:  No results for input(s): "CHOL", "HDL", "LDLCALC", "TRIG", "CHOLHDL" in the last 168 hours.  Cardiac Profile:  Recent Labs   Lab 08/12/23  0126   BNP 17     Inflammatory Labs:  No results for input(s): "CRP" in the last 168 hours.  Diabetes:  No results for input(s): "HGBA1C", "POCTGLUCOSE" in the last 168 hours.  Thyroid & Parathyroid:  No results for input(s): "TSH", "FREET4", "W3RFOQC", "A4PTVDX", "THYROIDAB" in the last 168 hours.    Monitor and Evaluation  Food and Nutrient Intake: energy intake, food and beverage intake  Food and Nutrient Adminstration: diet order  Knowledge/Beliefs/Attitudes: food and nutrition knowledge/skill  Physical Activity and Function: nutrition-related ADLs and IADLs  Anthropometric Measurements: weight change, body mass index, weight  Biochemical Data, Medical Tests and Procedures: electrolyte and renal panel, gastrointestinal profile, glucose/endocrine profile, inflammatory profile, lipid profile  Nutrition-Focused Physical Findings: overall appearance     Nutrition Risk  Level of Risk/Frequency of Follow-up: moderate - high     Nutrition Follow-Up  RD Follow-up?: Yes      Jaelyn Mireles RD, SHARMILA 08/16/2023 3:13 PM     "

## 2023-08-16 NOTE — PLAN OF CARE
placed ambulance transport request (ADT 30). Pick-up scheduled for 1300.       08/16/23 1238   Post-Acute Status   Post-Acute Authorization Placement   Post-Acute Placement Status Set-up Complete/Auth obtained   Discharge Delays None known at this time   Discharge Plan   Discharge Plan A Skilled Nursing Facility   Discharge Plan B Skilled Nursing Facility

## 2023-08-16 NOTE — CONSULTS
[facility]  Palliative Medicine  Consult Note    Patient Name: Steff Johnson  MRN: [id number]  Admission Date: 8/11/2023  Hospital Length of Stay: 0 days  Code Status: DNR   Attending Provider: Roger Berry MD  Consulting Provider: Leandro Renee MD  Primary Care Physician: Cristina Ren MD  Principal Problem:Problem related to discharge planning    Patient information was obtained from patient, relative(s), past medical records and primary team.      Inpatient consult to Palliative Care  Consult performed by: Leandro Renee MD  Consult ordered by: Roger Berry MD        Assessment/Plan:     Palliative Care  Goals of care, counseling/discussion  I reviewed her chart and discussed her case with her team.    I examined Ms. Johnson at bedside.  She lacks capacity for complex medical decision-making.  I spoke with her daughter, Leonie, at bedside.    I introduced myself and my role as palliative care physician.  She was agreeable to speaking.      We discussed her medical illness, prognosis, and values in detail.      Briefly, she understands that she was very debilitated due to her multiple comorbidities including multiple strokes.  She understands that she is currently admitted and being treated for urinary tract infection.      We discussed her prognosis.  I explained that her prognosis will be largely driven by her functional status.  If she can regain her function and participate in her care she could live many months or even years.  I explained if she does not improve from her current condition I worry she will have a life-threatening complication in the coming weeks or months result in her death.  She understood and was not surprised by this news.    We discussed her values.  At this point she worries that she has given up.  However she also has days where she is motivated to eat and participate in her care.  At this point, she does think her mom would be willing to undergo some physical  therapy and rehab in hopes of improving her condition.  She was hopeful to spend more time with her family.  She has 2 grandchildren that she adores.  She does not mention any worries or fears.  She does explain if her current condition does not improve she would not be able to find quality in life.    We spoke back and forth.  I answered many questions.  She specifically asked about power of .  I explained that we can help with healthcare power of ; however since Ms. Johnson lacks capacity we can not complete a healthcare power of  right now.  She understood.    Ultimately, did make a recommendation that we continue with current plan of care for placement at a skilled nursing facility.  If her mother makes progress towards recovery, then certainly continue with current plan of care.      I explained if her mother's condition does not improve or worsens then hospice would be very appropriate.  I spoke about hospice and the philosophy of hospice care in detail.  She understands in his open to hospice, but her goals are not hospice oriented today.      I did take a moment to discuss code status and clinical realities of code and her situation.  I made a recommendation of a DNR code status.  She understood and agreed.  She understands that I will place this order in chart.    Updated her case management team.      I appreciate being involved.  I hope I have been helpful.        Thank you for your consult. I will follow-up with patient. Please contact us if you have any additional questions.    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  She experienced a recent admission where she was diagnosed with new small strokes in the left parietal lobe.  She was treated with supportive measures and discharged home.  Within the same  day she was brought back to the emergency room by her daughter as she believes is unsafe for her mother to be home.  Workup did reveal a UTI for which she was admitted.  Given her debilitated state she carries a guarded prognosis.  I have been asked to assist with goals of care.      Hospital Course:  No notes on file    Interval History: See HPI    Past Medical History:   Diagnosis Date    Arthritis     Complete tear of left rotator cuff 11/09/2017    COPD (chronic obstructive pulmonary disease)     COVID-19 infection 07/02/2021    Diabetes mellitus     H/o Cerebrovascular accident (CVA) of left pontine structure 12/12/2019    Hypertension     Personal history of colonic polyps     Stroke     Wears glasses        Past Surgical History:   Procedure Laterality Date    COLONOSCOPY N/A 4/11/2017    Procedure: COLONOSCOPY;  Surgeon: Jared Antonio MD;  Location: [facility];  Service: Endoscopy;  Laterality: N/A;    HYSTERECTOMY      OOPHORECTOMY      SHOULDER SURGERY      R    TRANSESOPHAGEAL ECHOCARDIOGRAM WITH POSSIBLE CARDIOVERSION (GT W/ POSS CARDIOVERSION) N/A 5/4/2023    Procedure: Transesophageal echo (GT) intra-procedure log documentation;  Surgeon: Blade Phillip MD;  Location: [facility] CATH/EP LAB;  Service: Cardiology;  Laterality: N/A;       Review of patient's allergies indicates:  No Known Allergies    Medications:  Continuous Infusions:  Scheduled Meds:   amLODIPine  10 mg Oral Daily    aspirin  81 mg Oral Daily    atorvastatin  80 mg Oral QHS    cloNIDine  0.1 mg Oral TID    enoxparin  40 mg Subcutaneous Daily    EScitalopram oxalate  10 mg Oral QHS    insulin detemir U-100  13 Units Subcutaneous QHS    latanoprost  1 drop Both Eyes Daily    levETIRAcetam (Keppra) IV (PEDS and ADULTS)  1,000 mg Intravenous Q12H    levoFLOXacin  500 mg Intravenous Q24H    losartan  100 mg Oral Daily    metoprolol succinate  50 mg Oral Daily     PRN Meds:acetaminophen, bisacodyL, dextrose 50%,  dextrose 50%, glucagon (human recombinant), glucose, glucose, ipratropium, naloxone, ondansetron, sodium chloride 0.9%    Family History       Problem Relation (Age of Onset)    Anemia Daughter    Asthma Mother    Diabetes Mother, Father, Brother    Hypertension Mother, Brother          Tobacco Use    Smoking status: Never    Smokeless tobacco: Never   Substance and Sexual Activity    Alcohol use: No    Drug use: No    Sexual activity: Not Currently       Review of Systems   Unable to perform ROS: Patient nonverbal     Objective:     Vital Signs (Most Recent):  Temp: 97.8 °F (36.6 °C) (08/16/23 0725)  Pulse: (!) 53 (08/16/23 0725)  Resp: 17 (08/16/23 0725)  BP: (!) 156/76 (08/16/23 0725)  SpO2: 97 % (08/16/23 0725) Vital Signs (24h Range):  Temp:  [97.8 °F (36.6 °C)-99.3 °F (37.4 °C)] 97.8 °F (36.6 °C)  Pulse:  [52-66] 53  Resp:  [16-19] 17  SpO2:  [95 %-99 %] 97 %  BP: (122-168)/(67-80) 156/76     Weight: 55.8 kg (123 lb 0.3 oz)  Body mass index is 24.85 kg/m².       Physical Exam  Vitals reviewed.   Constitutional:       General: She is not in acute distress.     Appearance: She is ill-appearing. She is not toxic-appearing.   HENT:      Head: Normocephalic and atraumatic.      Right Ear: External ear normal.      Left Ear: External ear normal.      Nose: Nose normal. No congestion.      Mouth/Throat:      Mouth: Mucous membranes are moist.      Pharynx: No oropharyngeal exudate.   Eyes:      General:         Right eye: No discharge.         Left eye: No discharge.   Cardiovascular:      Rate and Rhythm: Normal rate and regular rhythm.      Pulses: Normal pulses.   Pulmonary:      Effort: Pulmonary effort is normal. No respiratory distress.   Abdominal:      General: There is no distension.      Palpations: Abdomen is soft.      Tenderness: There is no abdominal tenderness.   Skin:     General: Skin is warm.   Neurological:      Comments: Minimally responsive.  Opens eyes to voice.   Psychiatric:       "Comments: Non verbal            Review of Symptoms      Symptom Assessment (ESAS 0-10 Scale)  Unable to complete assessment due to Mental status change         Pain Assessment in Advanced Demential Scale (PAINAD)   Breathing - Independent of vocalization:  0  Negative vocalization:  0  Facial expression:  0  Body language:  0  Consolability:  0  Total:  0    Performance Status:  20    Living Arrangements:  Lives with family and Lives in home    Psychosocial/Cultural:   See Palliative Psychosocial Note: No  **Primary  to Follow**  Palliative Care  Consult: No        Advance Care Planning  Advance Directives:   Do Not Resuscitate Status: Yes      Decision Making:  Family answered questions and Patient unable to communicate due to disease severity/cognitive impairment  Goals of Care: The family endorses that what is most important right now is to focus on avoiding the hospital, remaining as independent as possible, symptom/pain control, and improvement in condition but with limits to invasive therapies    Accordingly, we have decided that the best plan to meet the patient's goals includes continuing with treatment and enrolling in hospice if her condition does not improve with rehab.         Significant Labs: BMP: No results for input(s): "GLU", "NA", "K", "CL", "CO2", "BUN", "CREATININE", "CALCIUM", "MG" in the last 48 hours.  CBC: No results for input(s): "WBC", "HGB", "HCT", "PLT" in the last 48 hours.  CBC:   Recent Labs   Lab 08/12/23  0126   WBC 12.23   HGB 9.7*   HCT 27.0*   MCV 79*        BMP:  No results for input(s): "GLU", "NA", "K", "CL", "CO2", "BUN", "CREATININE", "CALCIUM", "MG" in the last 24 hours.  LFT:  Lab Results   Component Value Date    AST 15 08/12/2023    ALKPHOS 82 08/12/2023    BILITOT 0.6 08/12/2023     Albumin:   Albumin   Date Value Ref Range Status   08/12/2023 3.1 (L) 3.5 - 5.2 g/dL Final     Protein:   Total Protein   Date Value Ref Range Status "   08/12/2023 6.5 6.0 - 8.4 g/dL Final     Lactic acid:   Lab Results   Component Value Date    LACTATE 1.7 08/04/2023    LACTATE 1.4 06/15/2023       Significant Imaging: I have reviewed all pertinent imaging results/findings within the past 24 hours.        I spent a total of 85 minutes on the day of the visit. This includes face to face time in discussion of goals of care, symptom assessment, coordination of care and emotional support.  This also includes non-face to face time preparing to see the patient (eg, review of tests/imaging), obtaining and/or reviewing separately obtained history, documenting clinical information in the electronic or other health record, independently interpreting results and communicating results to the patient/family/caregiver, or care coordinator.    Leandro Renee MD  Palliative Medicine  [facility]

## 2023-08-16 NOTE — SUBJECTIVE & OBJECTIVE
Interval History:       Review of Systems   Unable to perform ROS: Patient nonverbal     Objective:     Vital Signs (Most Recent):  Temp: 99.3 °F (37.4 °C) (08/15/23 1629)  Pulse: (!) 52 (08/15/23 1629)  Resp: 18 (08/15/23 1629)  BP: (!) 149/67 (08/15/23 1629)  SpO2: 96 % (08/15/23 1629) Vital Signs (24h Range):  Temp:  [98.5 °F (36.9 °C)-101 °F (38.3 °C)] 99.3 °F (37.4 °C)  Pulse:  [52-72] 52  Resp:  [16-18] 18  SpO2:  [94 %-99 %] 96 %  BP: (141-173)/(67-91) 149/67     Weight: 55.8 kg (123 lb 0.3 oz)  Body mass index is 24.85 kg/m².    Intake/Output Summary (Last 24 hours) at 8/15/2023 1932  Last data filed at 8/15/2023 1115  Gross per 24 hour   Intake 0 ml   Output 740 ml   Net -740 ml         Physical Exam  Vitals and nursing note reviewed.   Constitutional:       General: She is not in acute distress.     Appearance: Normal appearance.   HENT:      Head: Atraumatic.      Right Ear: External ear normal.      Left Ear: External ear normal.      Nose: Nose normal.      Mouth/Throat:      Mouth: Mucous membranes are moist.   Cardiovascular:      Rate and Rhythm: Normal rate.   Pulmonary:      Effort: Pulmonary effort is normal.   Musculoskeletal:         General: Normal range of motion.      Cervical back: Normal range of motion.   Skin:     General: Skin is warm.   Neurological:      Mental Status: Mental status is at baseline.               Significant Imaging: I have reviewed all pertinent imaging results/findings within the past 24 hours.

## 2023-08-16 NOTE — ASSESSMENT & PLAN NOTE
I reviewed her chart and discussed her case with her team.    I examined Ms. Johnson at bedside.  She lacks capacity for complex medical decision-making.  I spoke with her daughter, Leonie, at bedside.    I introduced myself and my role as palliative care physician.  She was agreeable to speaking.      We discussed her medical illness, prognosis, and values in detail.      Briefly, she understands that she was very debilitated due to her multiple comorbidities including multiple strokes.  She understands that she is currently admitted and being treated for urinary tract infection.      We discussed her prognosis.  I explained that her prognosis will be largely driven by her functional status.  If she can regain her function and participate in her care she could live many months or even years.  I explained if she does not improve from her current condition I worry she will have a life-threatening complication in the coming weeks or months result in her death.  She understood and was not surprised by this news.    We discussed her values.  At this point she worries that she has given up.  However she also has days where she is motivated to eat and participate in her care.  At this point, she does think her mom would be willing to undergo some physical therapy and rehab in hopes of improving her condition.  She was hopeful to spend more time with her family.  She has 2 grandchildren that she adores.  She does not mention any worries or fears.  She does explain if her current condition does not improve she would not be able to find quality in life.    We spoke back and forth.  I answered many questions.  She specifically asked about power of .  I explained that we can help with healthcare power of ; however since Ms. Johnson lacks capacity we can not complete a healthcare power of  right now.  She understood.    Ultimately, did make a recommendation that we continue with current plan of care for  placement at a skilled nursing facility.  If her mother makes progress towards recovery, then certainly continue with current plan of care.      I explained if her mother's condition does not improve or worsens then hospice would be very appropriate.  I spoke about hospice and the philosophy of hospice care in detail.  She understands in his open to hospice, but her goals are not hospice oriented today.      I did take a moment to discuss code status and clinical realities of code and her situation.  I made a recommendation of a DNR code status.  She understood and agreed.  She understands that I will place this order in chart.    Updated her case management team.      I appreciate being involved.  I hope I have been helpful.

## 2023-08-16 NOTE — PLAN OF CARE
Patient to transfer to [facility] in [place] ([address] [place] [address], phone [phone]) via ambulance transportation set up via ADT30 order. Patient's daughter Leonie [phone] to  patient's prescriptions that were sent to the [facility] outpatient pharmacy and get them to patient to take with her.    Discharge orders and chart reviewed with no further post-acute discharge needs identified at this time.  At this time, patient is cleared for discharge from Case Management standpoint.        08/16/23 1451   Final Note   Assessment Type Final Discharge Note   Anticipated Discharge Disposition SNF   Hospital Resources/Appts/Education Provided   (follow up appointments to be made by patient's family in [place].)   Post-Acute Status   Post-Acute Authorization Placement   Post-Acute Placement Status Set-up Complete/Auth obtained   Coverage [employer]   Discharge Delays None known at this time

## 2023-08-17 NOTE — DISCHARGE SUMMARY
[facility] Medicine  Discharge Summary      Patient Name: Steff Johnson  MRN: [id number]  JOYA: [id number]  Patient Class: OP- Observation  Admission Date: 8/11/2023  Hospital Length of Stay: 0 days  Discharge Date and Time:  08/16/2023 9:00 PM  Attending Physician: No att. providers found   Discharging Provider: Roger Berry MD  Primary Care Provider: Cristina Ren MD    Primary Care Team: Networked reference to record PCT     HPI:      63 y.o.  female With PMH of stroke with expressive aphasia, right upper extremity paralysis, HTN, DM, COPD.    who presented with worsening AMS and work up was done and found new small stroke and later DC to home with HH as per significant other's wish who is living with patient for many years.    Today daughter from [place] brought back since she feels like her mother is not safe at home and feels like significant other of patient's mother is not giving adequate care and wanted to be placed .    Found WBC in the urine and admitted with possible UTI . Case Mx consulted.  Daughter is also interested in hospice placement too. Last time she was started on keppra for seizure prevention post stroke .    On exam , no CP , no SOB , non communicative  and no family member around .               * No surgery found *      Hospital Course:   This Pt was hospitalized recently with CVA/seizure disorder  Plan was to discharge pt to SNF  Pts significant other opted for discharge to home with HH and pt was discharged  Pts daughters brought back pt to hospital within 24 hrsl and demanded SNF placement !  Pt was started on abx after she developed fever this time  CXR and all cultures were normal  Pt was evaluated by palliative care team and daughter agreed with DNR status  Eventually pt was discharged to SNF area in [facility] where daughters reside  Her long term prognosis is very poor and ideally should be under hospice care        Goals of Care Treatment Preferences:  Code  Status: DNR          What is most important right now is to focus on avoiding the hospital, remaining as independent as possible, symptom/pain control, improvement in condition but with limits to invasive therapies.  Accordingly, we have decided that the best plan to meet the patient's goals includes continuing with treatment.      Consults:   Consults (From admission, onward)        Status Ordering Provider     Inpatient consult to Palliative Care  Once        Provider:  Leandro Renee MD    Completed REBECA LAMAS     Inpatient consult to   Once        Provider:  (Not yet assigned)    PATRICIA Woods          No new Assessment & Plan notes have been filed under this hospital service since the last note was generated.  Service: Hospital Medicine    Final Active Diagnoses:    Diagnosis Date Noted POA    CVA (cerebral vascular accident) [I63.9] 12/12/2019 Yes    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 [N18.30] 09/26/2020 Yes    Type 2 diabetes mellitus with diabetic nephropathy, HbA1c 8.9% [E11.21] 07/19/2018 Yes     Chronic    Hypertension associated with diabetes [E11.59, I15.2] 03/31/2012 Yes    COPD (chronic obstructive pulmonary disease) [J44.9] 03/31/2012 Yes      Problems Resolved During this Admission:    Diagnosis Date Noted Date Resolved POA    PRINCIPAL PROBLEM:  Problem related to discharge planning [Z02.9] 08/13/2023 08/16/2023 Not Applicable    Fever [R50.9] 08/15/2023 08/16/2023 Unknown    Goals of care, counseling/discussion [Z71.89] 08/16/2023 08/16/2023 Not Applicable       Discharged Condition: fair    Disposition: Skilled Nursing Facility    Follow Up:    Patient Instructions:   No discharge procedures on file.        Pending Diagnostic Studies:     None         Medications:  Reconciled Home Medications:      Medication List      START taking these medications    levoFLOXacin 500 MG tablet  Commonly known as: LEVAQUIN  Take 1 tablet (500 mg total) by mouth once  daily. for 5 days        CHANGE how you take these medications    metoprolol succinate 50 MG 24 hr tablet  Commonly known as: TOPROL-XL  Take 1 tablet (50 mg total) by mouth once daily.  What changed:   · medication strength  · how much to take        CONTINUE taking these medications    amLODIPine 10 MG tablet  Commonly known as: NORVASC  Take 1 tablet (10 mg total) by mouth once daily.     aspirin 81 MG Chew  Take 1 tablet (81 mg total) by mouth once daily.     atorvastatin 80 MG tablet  Commonly known as: LIPITOR  Take 1 tablet (80 mg total) by mouth every evening.     cloNIDine 0.1 MG tablet  Commonly known as: CATAPRES  Take 1 tablet (0.1 mg total) by mouth 3 (three) times daily.     EScitalopram oxalate 10 MG tablet  Commonly known as: LEXAPRO  Take 10 mg by mouth every evening.     insulin glargine 100 unit/mL injection  Commonly known as: Lantus  Inject 13 Units into the skin every evening.     insulin lispro 100 unit/mL injection  Inject 5 Units into the skin 3 (three) times daily.     latanoprost 0.005 % ophthalmic solution  Place 1 drop into both eyes once daily.     levETIRAcetam 1000 MG tablet  Commonly known as: KEPPRA  Take 1 tablet (1,000 mg total) by mouth 2 (two) times daily.     losartan 100 MG tablet  Commonly known as: COZAAR  Take 1 tablet (100 mg total) by mouth once daily.     minoxidiL 10 MG Tab  Commonly known as: LONITEN  Take 10 mg by mouth 2 (two) times daily.     modafiniL 200 MG Tab  Commonly known as: PROVIGIL  Take 200 mg by mouth once daily.     SPIRIVA WITH HANDIHALER 18 mcg inhalation capsule  Generic drug: tiotropium  Inhale 1 capsule (18 mcg total) into the lungs once daily. Controller     umeclidinium 62.5 mcg/actuation inhalation capsule  Commonly known as: INCRUSE ELLIPTA  Inhale 62.5 mcg into the lungs once daily.        STOP taking these medications    dantrolene 50 MG Cap  Commonly known as: DANTRIUM     insulin aspart U-100 100 unit/mL (3 mL) Inpn pen  Commonly known as:  NovoLOG     insulin detemir U-100 (Levemir) 100 unit/mL (3 mL) Inpn pen            Indwelling Lines/Drains at time of discharge:   Lines/Drains/Airways     Drain  Duration                Urethral Catheter 08/12/23 0134 Straight-tip 16 Fr. 4 days              Physical Exam   Constitutional: She is oriented to person, place, and time.   Cardiovascular: Normal rate.   Neurological: She is alert and oriented to person, place, and time.     Time spent on the discharge of patient: 23 minutes         Roger Berry MD  Department of Hospital Medicine  [facility]

## 2023-08-18 LAB — BACTERIA UR CULT: NO GROWTH

## 2023-08-20 LAB — BACTERIA BLD CULT: NORMAL

## 2023-08-28 ENCOUNTER — PATIENT MESSAGE (OUTPATIENT)
Dept: ADMINISTRATIVE | Facility: HOSPITAL | Age: 63
End: 2023-08-28
Payer: MEDICARE

## 2023-10-18 ENCOUNTER — PATIENT MESSAGE (OUTPATIENT)
Dept: GASTROENTEROLOGY | Facility: CLINIC | Age: 63
End: 2023-10-18
Payer: MEDICARE

## 2023-10-23 DIAGNOSIS — N73.9 FEMALE PELVIC INFLAMMATION: Primary | ICD-10-CM

## 2023-10-24 ENCOUNTER — HOSPITAL ENCOUNTER (INPATIENT)
Facility: HOSPITAL | Age: 63
LOS: 3 days | Discharge: NURSING, REHAB OR LONG-TERM CARE | DRG: 698 | End: 2023-10-27
Attending: EMERGENCY MEDICINE | Admitting: INTERNAL MEDICINE
Payer: MEDICARE

## 2023-10-24 DIAGNOSIS — D75.839 THROMBOCYTOSIS: Primary | ICD-10-CM

## 2023-10-24 DIAGNOSIS — D50.9 MICROCYTIC ANEMIA: ICD-10-CM

## 2023-10-24 DIAGNOSIS — R07.9 CHEST PAIN: ICD-10-CM

## 2023-10-24 DIAGNOSIS — L89.150 PRESSURE INJURY OF SACRAL REGION, UNSTAGEABLE: ICD-10-CM

## 2023-10-24 DIAGNOSIS — A41.9 SEPSIS, DUE TO UNSPECIFIED ORGANISM, UNSPECIFIED WHETHER ACUTE ORGAN DYSFUNCTION PRESENT: ICD-10-CM

## 2023-10-24 DIAGNOSIS — D72.829 LEUKOCYTOSIS, UNSPECIFIED TYPE: ICD-10-CM

## 2023-10-24 DIAGNOSIS — R41.82 AMS (ALTERED MENTAL STATUS): ICD-10-CM

## 2023-10-24 DIAGNOSIS — N18.31 STAGE 3A CHRONIC KIDNEY DISEASE: ICD-10-CM

## 2023-10-24 LAB
ALBUMIN SERPL BCP-MCNC: 3.1 G/DL (ref 3.5–5.2)
ALP SERPL-CCNC: 128 U/L (ref 55–135)
ALT SERPL W/O P-5'-P-CCNC: 17 U/L (ref 10–44)
AMMONIA PLAS-SCNC: 36 UMOL/L (ref 10–50)
AMPHET+METHAMPHET UR QL: NEGATIVE
ANION GAP SERPL CALC-SCNC: 10 MMOL/L (ref 8–16)
APTT PPP: 30.8 SEC (ref 21–32)
AST SERPL-CCNC: 18 U/L (ref 10–40)
BACTERIA #/AREA URNS HPF: ABNORMAL /HPF
BARBITURATES UR QL SCN>200 NG/ML: NEGATIVE
BASOPHILS # BLD AUTO: 0.07 K/UL (ref 0–0.2)
BASOPHILS NFR BLD: 0.4 % (ref 0–1.9)
BENZODIAZ UR QL SCN>200 NG/ML: NEGATIVE
BILIRUB SERPL-MCNC: 0.4 MG/DL (ref 0.1–1)
BILIRUB UR QL STRIP: NEGATIVE
BUN SERPL-MCNC: 29 MG/DL (ref 8–23)
BZE UR QL SCN: NEGATIVE
CALCIUM SERPL-MCNC: 9.2 MG/DL (ref 8.7–10.5)
CANNABINOIDS UR QL SCN: NEGATIVE
CHLORIDE SERPL-SCNC: 101 MMOL/L (ref 95–110)
CLARITY UR: CLEAR
CO2 SERPL-SCNC: 25 MMOL/L (ref 23–29)
COLOR UR: YELLOW
CREAT SERPL-MCNC: 0.6 MG/DL (ref 0.5–1.4)
CREAT UR-MCNC: 47.3 MG/DL (ref 15–325)
DIFFERENTIAL METHOD BLD: ABNORMAL
EOSINOPHIL # BLD AUTO: 0.1 K/UL (ref 0–0.5)
EOSINOPHIL NFR BLD: 0.7 % (ref 0–8)
ERYTHROCYTE [DISTWIDTH] IN BLOOD BY AUTOMATED COUNT: 17.7 % (ref 11.5–14.5)
EST. GFR  (NO RACE VARIABLE): >60 ML/MIN/1.73 M^2
GLUCOSE SERPL-MCNC: 88 MG/DL (ref 70–110)
GLUCOSE UR QL STRIP: NEGATIVE
HCT VFR BLD AUTO: 25.9 % (ref 37–48.5)
HGB BLD-MCNC: 8.8 G/DL (ref 12–16)
HGB UR QL STRIP: NEGATIVE
HYALINE CASTS #/AREA URNS LPF: 2 /LPF
IMM GRANULOCYTES # BLD AUTO: 0.46 K/UL (ref 0–0.04)
IMM GRANULOCYTES NFR BLD AUTO: 2.6 % (ref 0–0.5)
INR PPP: 1 (ref 0.8–1.2)
KETONES UR QL STRIP: NEGATIVE
LACTATE SERPL-SCNC: 2.8 MMOL/L (ref 0.5–1.9)
LEUKOCYTE ESTERASE UR QL STRIP: ABNORMAL
LIPASE SERPL-CCNC: 61 U/L (ref 4–60)
LYMPHOCYTES # BLD AUTO: 2.2 K/UL (ref 1–4.8)
LYMPHOCYTES NFR BLD: 12.5 % (ref 18–48)
MCH RBC QN AUTO: 25.8 PG (ref 27–31)
MCHC RBC AUTO-ENTMCNC: 34 G/DL (ref 32–36)
MCV RBC AUTO: 76 FL (ref 82–98)
MICROSCOPIC COMMENT: ABNORMAL
MONOCYTES # BLD AUTO: 1.2 K/UL (ref 0.3–1)
MONOCYTES NFR BLD: 6.4 % (ref 4–15)
NEUTROPHILS # BLD AUTO: 14 K/UL (ref 1.8–7.7)
NEUTROPHILS NFR BLD: 77.4 % (ref 38–73)
NITRITE UR QL STRIP: NEGATIVE
NRBC BLD-RTO: 0 /100 WBC
OPIATES UR QL SCN: NEGATIVE
PCP UR QL SCN>25 NG/ML: NEGATIVE
PH UR STRIP: >8 [PH] (ref 5–8)
PLATELET # BLD AUTO: 1147 K/UL (ref 150–450)
PLATELET BLD QL SMEAR: ABNORMAL
PMV BLD AUTO: 9.3 FL (ref 9.2–12.9)
POTASSIUM SERPL-SCNC: 4.4 MMOL/L (ref 3.5–5.1)
PROT SERPL-MCNC: 7.6 G/DL (ref 6–8.4)
PROT UR QL STRIP: ABNORMAL
PROTHROMBIN TIME: 11.6 SEC (ref 9–12.5)
RBC # BLD AUTO: 3.41 M/UL (ref 4–5.4)
SODIUM SERPL-SCNC: 136 MMOL/L (ref 136–145)
SP GR UR STRIP: 1.02 (ref 1–1.03)
SQUAMOUS #/AREA URNS HPF: 5 /HPF
TOXICOLOGY INFORMATION: NORMAL
TSH SERPL DL<=0.005 MIU/L-ACNC: 1.91 UIU/ML (ref 0.34–5.6)
URN SPEC COLLECT METH UR: ABNORMAL
UROBILINOGEN UR STRIP-ACNC: ABNORMAL EU/DL
WBC # BLD AUTO: 17.99 K/UL (ref 3.9–12.7)
WBC #/AREA URNS HPF: 10 /HPF (ref 0–5)

## 2023-10-24 PROCEDURE — 85730 THROMBOPLASTIN TIME PARTIAL: CPT | Performed by: STUDENT IN AN ORGANIZED HEALTH CARE EDUCATION/TRAINING PROGRAM

## 2023-10-24 PROCEDURE — 80307 DRUG TEST PRSMV CHEM ANLYZR: CPT | Performed by: STUDENT IN AN ORGANIZED HEALTH CARE EDUCATION/TRAINING PROGRAM

## 2023-10-24 PROCEDURE — 87077 CULTURE AEROBIC IDENTIFY: CPT | Performed by: STUDENT IN AN ORGANIZED HEALTH CARE EDUCATION/TRAINING PROGRAM

## 2023-10-24 PROCEDURE — 83605 ASSAY OF LACTIC ACID: CPT | Performed by: STUDENT IN AN ORGANIZED HEALTH CARE EDUCATION/TRAINING PROGRAM

## 2023-10-24 PROCEDURE — 25500020 PHARM REV CODE 255: Performed by: EMERGENCY MEDICINE

## 2023-10-24 PROCEDURE — 82140 ASSAY OF AMMONIA: CPT | Performed by: STUDENT IN AN ORGANIZED HEALTH CARE EDUCATION/TRAINING PROGRAM

## 2023-10-24 PROCEDURE — 87040 BLOOD CULTURE FOR BACTERIA: CPT | Mod: 59 | Performed by: STUDENT IN AN ORGANIZED HEALTH CARE EDUCATION/TRAINING PROGRAM

## 2023-10-24 PROCEDURE — 96365 THER/PROPH/DIAG IV INF INIT: CPT

## 2023-10-24 PROCEDURE — 85610 PROTHROMBIN TIME: CPT | Performed by: STUDENT IN AN ORGANIZED HEALTH CARE EDUCATION/TRAINING PROGRAM

## 2023-10-24 PROCEDURE — 93005 ELECTROCARDIOGRAM TRACING: CPT | Performed by: INTERNAL MEDICINE

## 2023-10-24 PROCEDURE — 12000002 HC ACUTE/MED SURGE SEMI-PRIVATE ROOM

## 2023-10-24 PROCEDURE — 80053 COMPREHEN METABOLIC PANEL: CPT | Performed by: STUDENT IN AN ORGANIZED HEALTH CARE EDUCATION/TRAINING PROGRAM

## 2023-10-24 PROCEDURE — 83690 ASSAY OF LIPASE: CPT | Performed by: STUDENT IN AN ORGANIZED HEALTH CARE EDUCATION/TRAINING PROGRAM

## 2023-10-24 PROCEDURE — 81001 URINALYSIS AUTO W/SCOPE: CPT | Performed by: STUDENT IN AN ORGANIZED HEALTH CARE EDUCATION/TRAINING PROGRAM

## 2023-10-24 PROCEDURE — 87186 SC STD MICRODIL/AGAR DIL: CPT | Performed by: STUDENT IN AN ORGANIZED HEALTH CARE EDUCATION/TRAINING PROGRAM

## 2023-10-24 PROCEDURE — 63600175 PHARM REV CODE 636 W HCPCS: Performed by: STUDENT IN AN ORGANIZED HEALTH CARE EDUCATION/TRAINING PROGRAM

## 2023-10-24 PROCEDURE — 93010 ELECTROCARDIOGRAM REPORT: CPT | Mod: ,,, | Performed by: INTERNAL MEDICINE

## 2023-10-24 PROCEDURE — 96367 TX/PROPH/DG ADDL SEQ IV INF: CPT

## 2023-10-24 PROCEDURE — 99285 EMERGENCY DEPT VISIT HI MDM: CPT | Mod: 25

## 2023-10-24 PROCEDURE — 96361 HYDRATE IV INFUSION ADD-ON: CPT

## 2023-10-24 PROCEDURE — 25000003 PHARM REV CODE 250: Performed by: INTERNAL MEDICINE

## 2023-10-24 PROCEDURE — 87086 URINE CULTURE/COLONY COUNT: CPT | Performed by: STUDENT IN AN ORGANIZED HEALTH CARE EDUCATION/TRAINING PROGRAM

## 2023-10-24 PROCEDURE — 36415 COLL VENOUS BLD VENIPUNCTURE: CPT | Performed by: STUDENT IN AN ORGANIZED HEALTH CARE EDUCATION/TRAINING PROGRAM

## 2023-10-24 PROCEDURE — 25000003 PHARM REV CODE 250: Performed by: STUDENT IN AN ORGANIZED HEALTH CARE EDUCATION/TRAINING PROGRAM

## 2023-10-24 PROCEDURE — 84443 ASSAY THYROID STIM HORMONE: CPT | Performed by: STUDENT IN AN ORGANIZED HEALTH CARE EDUCATION/TRAINING PROGRAM

## 2023-10-24 PROCEDURE — 85025 COMPLETE CBC W/AUTO DIFF WBC: CPT | Performed by: STUDENT IN AN ORGANIZED HEALTH CARE EDUCATION/TRAINING PROGRAM

## 2023-10-24 RX ORDER — THIAMINE HCL 100 MG
100 TABLET ORAL DAILY
Status: DISCONTINUED | OUTPATIENT
Start: 2023-10-25 | End: 2023-10-27 | Stop reason: HOSPADM

## 2023-10-24 RX ORDER — MINOXIDIL 2.5 MG/1
10 TABLET ORAL 2 TIMES DAILY
Status: DISCONTINUED | OUTPATIENT
Start: 2023-10-24 | End: 2023-10-25

## 2023-10-24 RX ORDER — MULTIVITAMIN
1 TABLET ORAL DAILY
COMMUNITY

## 2023-10-24 RX ORDER — IBUPROFEN 200 MG
24 TABLET ORAL
Status: DISCONTINUED | OUTPATIENT
Start: 2023-10-24 | End: 2023-10-25

## 2023-10-24 RX ORDER — IPRATROPIUM BROMIDE 0.5 MG/2.5ML
0.5 SOLUTION RESPIRATORY (INHALATION) EVERY 6 HOURS
Status: DISCONTINUED | OUTPATIENT
Start: 2023-10-25 | End: 2023-10-25

## 2023-10-24 RX ORDER — METOPROLOL TARTRATE 25 MG/1
25 TABLET, FILM COATED ORAL 2 TIMES DAILY
COMMUNITY

## 2023-10-24 RX ORDER — SODIUM CHLORIDE 0.9 % (FLUSH) 0.9 %
10 SYRINGE (ML) INJECTION EVERY 12 HOURS PRN
Status: DISCONTINUED | OUTPATIENT
Start: 2023-10-24 | End: 2023-10-27 | Stop reason: HOSPADM

## 2023-10-24 RX ORDER — LEVOFLOXACIN 750 MG/1
750 TABLET ORAL DAILY
Status: ON HOLD | COMMUNITY
End: 2023-10-27 | Stop reason: HOSPADM

## 2023-10-24 RX ORDER — INSULIN GLARGINE 100 [IU]/ML
35 INJECTION, SOLUTION SUBCUTANEOUS DAILY
COMMUNITY

## 2023-10-24 RX ORDER — AMLODIPINE BESYLATE 5 MG/1
10 TABLET ORAL DAILY
Status: DISCONTINUED | OUTPATIENT
Start: 2023-10-25 | End: 2023-10-27 | Stop reason: HOSPADM

## 2023-10-24 RX ORDER — MEROPENEM AND SODIUM CHLORIDE 1 G/50ML
1 INJECTION, SOLUTION INTRAVENOUS
Status: DISCONTINUED | OUTPATIENT
Start: 2023-10-25 | End: 2023-10-24

## 2023-10-24 RX ORDER — POLYETHYLENE GLYCOL 3350 17 G/17G
17 POWDER, FOR SOLUTION ORAL DAILY
COMMUNITY
End: 2024-05-08 | Stop reason: HOSPADM

## 2023-10-24 RX ORDER — LANOLIN ALCOHOL/MO/W.PET/CERES
100 CREAM (GRAM) TOPICAL DAILY
COMMUNITY

## 2023-10-24 RX ORDER — IBUPROFEN 200 MG
16 TABLET ORAL
Status: DISCONTINUED | OUTPATIENT
Start: 2023-10-24 | End: 2023-10-25

## 2023-10-24 RX ORDER — ESCITALOPRAM OXALATE 10 MG/1
10 TABLET ORAL NIGHTLY
Status: DISCONTINUED | OUTPATIENT
Start: 2023-10-24 | End: 2023-10-27 | Stop reason: HOSPADM

## 2023-10-24 RX ORDER — LOSARTAN POTASSIUM 25 MG/1
100 TABLET ORAL DAILY
Status: DISCONTINUED | OUTPATIENT
Start: 2023-10-25 | End: 2023-10-25

## 2023-10-24 RX ORDER — GLUCAGON 1 MG
1 KIT INJECTION
Status: DISCONTINUED | OUTPATIENT
Start: 2023-10-24 | End: 2023-10-25

## 2023-10-24 RX ORDER — FOLIC ACID 1 MG/1
1000 TABLET ORAL EVERY MORNING
Status: DISCONTINUED | OUTPATIENT
Start: 2023-10-25 | End: 2023-10-27 | Stop reason: HOSPADM

## 2023-10-24 RX ORDER — METFORMIN HYDROCHLORIDE 500 MG/1
1000 TABLET ORAL 2 TIMES DAILY WITH MEALS
COMMUNITY
End: 2024-04-25 | Stop reason: DRUGHIGH

## 2023-10-24 RX ORDER — BACLOFEN 5 MG/1
5 TABLET ORAL EVERY 8 HOURS PRN
COMMUNITY

## 2023-10-24 RX ORDER — LEVETIRACETAM 100 MG/ML
500 SOLUTION ORAL 2 TIMES DAILY
COMMUNITY

## 2023-10-24 RX ORDER — ATORVASTATIN CALCIUM 40 MG/1
80 TABLET, FILM COATED ORAL NIGHTLY
Status: DISCONTINUED | OUTPATIENT
Start: 2023-10-24 | End: 2023-10-27 | Stop reason: HOSPADM

## 2023-10-24 RX ORDER — LEVETIRACETAM 100 MG/ML
500 SOLUTION ORAL 2 TIMES DAILY
Status: DISCONTINUED | OUTPATIENT
Start: 2023-10-24 | End: 2023-10-27 | Stop reason: HOSPADM

## 2023-10-24 RX ORDER — NALOXONE HCL 0.4 MG/ML
0.02 VIAL (ML) INJECTION
Status: DISCONTINUED | OUTPATIENT
Start: 2023-10-24 | End: 2023-10-27 | Stop reason: HOSPADM

## 2023-10-24 RX ORDER — MUPIROCIN 20 MG/G
OINTMENT TOPICAL 2 TIMES DAILY
Status: DISCONTINUED | OUTPATIENT
Start: 2023-10-24 | End: 2023-10-27 | Stop reason: HOSPADM

## 2023-10-24 RX ORDER — FOLIC ACID 1 MG/1
1 TABLET ORAL EVERY MORNING
COMMUNITY
Start: 2023-09-02 | End: 2024-09-01

## 2023-10-24 RX ORDER — CLONIDINE HYDROCHLORIDE 0.1 MG/1
0.1 TABLET ORAL 3 TIMES DAILY
Status: DISCONTINUED | OUTPATIENT
Start: 2023-10-25 | End: 2023-10-27 | Stop reason: HOSPADM

## 2023-10-24 RX ORDER — LANOLIN ALCOHOL/MO/W.PET/CERES
400 CREAM (GRAM) TOPICAL DAILY
COMMUNITY

## 2023-10-24 RX ORDER — LATANOPROST 50 UG/ML
1 SOLUTION/ DROPS OPHTHALMIC NIGHTLY
Status: DISCONTINUED | OUTPATIENT
Start: 2023-10-24 | End: 2023-10-27 | Stop reason: HOSPADM

## 2023-10-24 RX ORDER — METOPROLOL TARTRATE 25 MG/1
25 TABLET, FILM COATED ORAL 2 TIMES DAILY
Status: DISCONTINUED | OUTPATIENT
Start: 2023-10-24 | End: 2023-10-27 | Stop reason: HOSPADM

## 2023-10-24 RX ORDER — LEVETIRACETAM 500 MG/1
1000 TABLET ORAL 2 TIMES DAILY
Status: DISCONTINUED | OUTPATIENT
Start: 2023-10-24 | End: 2023-10-24

## 2023-10-24 RX ORDER — POLYETHYLENE GLYCOL 3350 17 G/17G
17 POWDER, FOR SOLUTION ORAL DAILY
Status: DISCONTINUED | OUTPATIENT
Start: 2023-10-25 | End: 2023-10-27 | Stop reason: HOSPADM

## 2023-10-24 RX ORDER — INSULIN ASPART 100 [IU]/ML
4 INJECTION, SOLUTION INTRAVENOUS; SUBCUTANEOUS SEE ADMIN INSTRUCTIONS
COMMUNITY
End: 2024-02-23 | Stop reason: ALTCHOICE

## 2023-10-24 RX ORDER — NAPROXEN SODIUM 220 MG/1
81 TABLET, FILM COATED ORAL DAILY
Status: DISCONTINUED | OUTPATIENT
Start: 2023-10-25 | End: 2023-10-27 | Stop reason: HOSPADM

## 2023-10-24 RX ADMIN — CEFEPIME HYDROCHLORIDE 2 G: 2 INJECTION, POWDER, FOR SOLUTION INTRAVENOUS at 03:10

## 2023-10-24 RX ADMIN — METOPROLOL TARTRATE 25 MG: 25 TABLET, FILM COATED ORAL at 11:10

## 2023-10-24 RX ADMIN — APIXABAN 2.5 MG: 2.5 TABLET, FILM COATED ORAL at 11:10

## 2023-10-24 RX ADMIN — ATORVASTATIN CALCIUM 80 MG: 40 TABLET, FILM COATED ORAL at 11:10

## 2023-10-24 RX ADMIN — ESCITALOPRAM OXALATE 10 MG: 10 TABLET ORAL at 11:10

## 2023-10-24 RX ADMIN — MINOXIDIL 10 MG: 2.5 TABLET ORAL at 11:10

## 2023-10-24 RX ADMIN — MUPIROCIN 1 G: 20 OINTMENT TOPICAL at 11:10

## 2023-10-24 RX ADMIN — MEROPENEM 1 G: 1 INJECTION, POWDER, FOR SOLUTION INTRAVENOUS at 06:10

## 2023-10-24 RX ADMIN — IOHEXOL 100 ML: 350 INJECTION, SOLUTION INTRAVENOUS at 05:10

## 2023-10-24 RX ADMIN — LEVETIRACETAM 500 MG: 100 SOLUTION ORAL at 11:10

## 2023-10-24 RX ADMIN — SODIUM CHLORIDE, SODIUM LACTATE, POTASSIUM CHLORIDE, AND CALCIUM CHLORIDE 500 ML: .6; .31; .03; .02 INJECTION, SOLUTION INTRAVENOUS at 03:10

## 2023-10-24 RX ADMIN — VANCOMYCIN HYDROCHLORIDE 1250 MG: 1.25 INJECTION, POWDER, LYOPHILIZED, FOR SOLUTION INTRAVENOUS at 04:10

## 2023-10-24 RX ADMIN — SODIUM CHLORIDE, SODIUM LACTATE, POTASSIUM CHLORIDE, AND CALCIUM CHLORIDE 1000 ML: .6; .31; .03; .02 INJECTION, SOLUTION INTRAVENOUS at 05:10

## 2023-10-24 NOTE — Clinical Note
Diagnosis: Thrombocytosis [870219]   Admitting Provider:: CACHORRO SPICER [[id number]]   Admit to which facility:: [facility] [[id number]]   Future Attending Provider: CACHORRO SPICER [[id number]]   Reason for IP Medical Treatment  (Clinical interventions that can only be accomplished in the IP setting? ) :: iv abx   I certify that Inpatient services for greater than or equal to 2 midnights are medically necessary:: Yes   Plans for Post-Acute care--if anticipated (pick the single best option):: A. No post acute care anticipated at this time

## 2023-10-24 NOTE — ED PROVIDER NOTES
Encounter Date: 10/24/2023       History     Chief Complaint   Patient presents with    Abnormal Labs     63-year-old female with a history of a CVA in 2020 in the left pontine with associated aphasia and dysphagia, COPD, diabetes, hypertension who presents from [facility] with concern for decreased movement, worsening aphasia, and worsening labs?  Unable to clear history from the nursing home however they mentioned that there is a possible pelvic fluid collection that she need to be seen for?  Patient is able to say her name but is unable to maintain further conversation due to her aphasia.  She is able to look around.  She shakes her head when I asked her about pain anywhere including her chest and abdomen.  Nursing home did send reports of urine infection with concern for possible Candida auris.  She was maintained on Levaquin at the facility after she was discharged.        Review of patient's allergies indicates:  No Known Allergies  Past Medical History:   Diagnosis Date    Arthritis     Complete tear of left rotator cuff 11/09/2017    COPD (chronic obstructive pulmonary disease)     COVID-19 infection 07/02/2021    Diabetes mellitus     H/o Cerebrovascular accident (CVA) of left pontine structure 12/12/2019    Hypertension     Personal history of colonic polyps     Stroke     Wears glasses      Past Surgical History:   Procedure Laterality Date    COLONOSCOPY N/A 4/11/2017    Procedure: COLONOSCOPY;  Surgeon: Jared Antonio MD;  Location: [facility];  Service: Endoscopy;  Laterality: N/A;    HYSTERECTOMY      OOPHORECTOMY      SHOULDER SURGERY      R    TRANSESOPHAGEAL ECHOCARDIOGRAM WITH POSSIBLE CARDIOVERSION (GT W/ POSS CARDIOVERSION) N/A 5/4/2023    Procedure: Transesophageal echo (GT) intra-procedure log documentation;  Surgeon: Blade Phillip MD;  Location: [facility] CATH/EP LAB;  Service: Cardiology;  Laterality: N/A;     Family History   Problem Relation Age of Onset    Diabetes  Mother     Asthma Mother     Hypertension Mother     Diabetes Father     Diabetes Brother     Hypertension Brother     Anemia Daughter     Glaucoma Neg Hx     Macular degeneration Neg Hx     Retinal detachment Neg Hx      Social History     Tobacco Use    Smoking status: Never    Smokeless tobacco: Never   Substance Use Topics    Alcohol use: No    Drug use: No     Review of Systems   Unable to perform ROS: Patient nonverbal       Physical Exam     Initial Vitals   BP Pulse Resp Temp SpO2   10/24/23 1415 10/24/23 1415 10/24/23 1415 10/24/23 1436 10/24/23 1415   (!) 166/77 101 20 98.5 °F (36.9 °C) 99 %      MAP       --                Physical Exam    Nursing note and vitals reviewed.  Constitutional: She appears well-developed and well-nourished.   Elderly female lying in bed, breathing appropriately, minimal speech due to aphasia, able to look around the room.  Chronic Mathur catheter in place.   HENT:   Head: Normocephalic and atraumatic.   Eyes: EOM are normal. Pupils are equal, round, and reactive to light.   Neck:   Normal range of motion.  Cardiovascular:  Normal rate and regular rhythm.     Exam reveals no friction rub.       No murmur heard.  Pulmonary/Chest: Breath sounds normal. No respiratory distress. She has no wheezes. She has no rales.   Abdominal: Abdomen is soft. Bowel sounds are normal. She exhibits no distension. There is abdominal tenderness.   Mild tenderness and guarding noted for lower abdomen There is guarding.   Musculoskeletal:         General: Normal range of motion.      Cervical back: Normal range of motion.     Neurological: She is alert.   Per nursing home, this is away from her baseline.   Skin: Skin is warm and dry.         ED Course   Procedures  Labs Reviewed   CBC W/ AUTO DIFFERENTIAL - Abnormal; Notable for the following components:       Result Value    WBC 17.99 (*)     RBC 3.41 (*)     Hemoglobin 8.8 (*)     Hematocrit 25.9 (*)     MCV 76 (*)     MCH 25.8 (*)     RDW 17.7 (*)      Platelets 1,147 (*)     Immature Granulocytes 2.6 (*)     Gran # (ANC) 14.0 (*)     Immature Grans (Abs) 0.46 (*)     Mono # 1.2 (*)     Gran % 77.4 (*)     Lymph % 12.5 (*)     Platelet Estimate Increased (*)     All other components within normal limits    Narrative:     plt critical result(s) repeated. Called and verbal readback obtained   from James Ham RN by LS1 10/24/2023 15:37   COMPREHENSIVE METABOLIC PANEL - Abnormal; Notable for the following components:    BUN 29 (*)     Albumin 3.1 (*)     All other components within normal limits   LIPASE - Abnormal; Notable for the following components:    Lipase 61 (*)     All other components within normal limits   URINALYSIS - Abnormal; Notable for the following components:    pH, UA >8.0 (*)     Protein, UA Trace (*)     Urobilinogen, UA 2.0-3.0 (*)     Leukocytes, UA Trace (*)     All other components within normal limits    Narrative:     Collection Type->Urine, Clean Catch  Specimen Source->Urine   LACTIC ACID, PLASMA - Abnormal; Notable for the following components:    Lactate (Lactic Acid) 2.8 (*)     All other components within normal limits   URINALYSIS MICROSCOPIC - Abnormal; Notable for the following components:    WBC, UA 10 (*)     Hyaline Casts, UA 2 (*)     All other components within normal limits    Narrative:     Collection Type->Urine, Clean Catch  Specimen Source->Urine   CULTURE, BLOOD   CULTURE, BLOOD   CULTURE, URINE   DRUG SCREEN PANEL, URINE EMERGENCY    Narrative:     Collection Type->Urine, Clean Catch  Specimen Source->Urine   TSH   PROTIME-INR   APTT   AMMONIA        ECG Results              EKG 12-lead (In process)  Result time 10/24/23 15:09:54      In process by Interface, Lab In [facility] (10/24/23 15:09:54)                   Narrative:    Test Reason : R41.82,    Vent. Rate : 101 BPM     Atrial Rate : 101 BPM     P-R Int : 128 ms          QRS Dur : 056 ms      QT Int : 356 ms       P-R-T Axes : 034 020 064 degrees     QTc Int :  461 ms    Sinus tachycardia  Cannot rule out Anterior infarct ,age undetermined  Abnormal ECG  When compared with ECG of 12-AUG-2023 01:24,  Vent. rate has increased BY  43 BPM    Referred By: [email or web address]   SELF           Confirmed By:                                   Imaging Results              CT Abdomen Pelvis With Contrast (Final result)  Result time 10/24/23 17:42:05      Final result by Fozia Le DO (10/24/23 17:42:05)                   Narrative:    CT ABDOMEN AND PELVIS WITH INTRAVENOUS CONTRAST: 10/24/2023 5:40 PM CDT    HISTORY: 63 years  old Female with Abdominal abscess/infection suspected.    COMPARISON: None available    TECHNIQUE: Axial contiguous images were obtained from the lung bases to the proximal femurs with intravenous intravenous contrast administered. Sagittal and coronal reconstructions were also obtained and reviewed. This exam was performed according to our departmental dose-optimization program, which includes automated exposure control, adjustment of the mA and/or KV according to the patient's size and/or use of iterative reconstruction technique.    FINDINGS:    LUNG BASES: No focal consolidative airspace opacities are seen.  No discrete pleural effusions are seen.  The cardiac silhouette is prominent in size.    LIVER: The visualized hepatic parenchyma demonstrates no focal abnormality.  The main portal vein is well opacified with contrast.    GALLBLADDER: The punctate calcification within the gallbladder lumen. No significant gallbladder wall thickening or inflammatory stranding is seen.    SPLEEN: The spleen is normal in size.    PANCREAS: The pancreas is unremarkable.    ADRENAL GLANDS: The bilateral adrenal glands are unremarkable.    KIDNEYS: There is mild right hydroureteronephrosis. No gross renal or ureteral calculi are seen. No hydronephrosis is seen on the left side. There is a calcified phlebolith seen near the distal right ureter, definitely outside of the  ureter.    PELVIS: The urinary bladder is decompressed by Mathur catheter.    STOMACH: The stomach is not well distended. A gastrostomy tube tip is seen at the stomach.    BOWEL: The small bowel loops appear unremarkable. No pericolonic inflammatory stranding is seen.    APPENDIX: The appendix is unremarkable.    PERITONEUM: There is no evidence of pneumoperitoneum or free fluid.    VESSELS: The IVC is unremarkable. The abdominal aorta is within normal limits of size.    LYMPH NODES: No significantly enlarged lymph nodes are seen in the abdomen or pelvis.    BONES AND SOFT TISSUES: No acute osseous abnormality is seen.    IMPRESSION: There is mild right hydronephrosis. The urinary bladder is decompressed by Mathur catheter. No gross renal or ureteral calculi are seen. This will need subsequent follow-up.    No obvious fluid collection is apparent.    Electronically signed by:  Fozia Le DO  10/24/2023 05:42 PM CDT Workstation: [id number]                                     CT Head Without Contrast (Final result)  Result time 10/24/23 17:29:33      Final result by Rj Kellogg MD (10/24/23 17:29:33)                   Narrative:    CMS MANDATED QUALITY DATA - CT RADIATION - 436    All CT scans at this facility use dose modulation, iterative-reconstruction, and/or weight-based dosing when appropriate to reduce radiation dose to as low as reasonably achievable.        HISTORY:  Mental status change.    TECHNIQUE:  CT images were obtained from the skull base to the vertex without the administration of intravenous contrast. Coronal and sagittal reconstructions were performed. The patient received approximate 970 mGy-cm of radiation exposure from this exam.    COMPARISON: Comparison to previous study dated August 4, 2023.    FINDINGS:  Diffuse atrophy is present. Severe chronic-appearing periventricular white matter ischemic changes are present.  The basal cisterns are patent.  No mass effect or midline shift is  seen.  No evidence of acute intracranial hemorrhage, mass, or acute infarct is seen.  The gray/white matter differentiation is preserved.  There are no intra- or extra-axial fluid collections identified.  Paranasal sinuses and mastoid air cells are clear.  Visualized portions of the orbits and osseous structures are unremarkable.    IMPRESSION:    1. Atrophy and severe chronic white matter ischemic changes.  2. No acute intracranial abnormality seen.          Electronically signed by:  Rj Kellogg MD  10/24/2023 05:29 PM CDT Workstation: [id number]                                     X-Ray Chest AP Portable (Final result)  Result time 10/24/23 14:33:26      Final result by Tammy Duncan MD (10/24/23 14:33:26)                   Narrative:    XR CHEST 1 VIEW    CLINICAL HISTORY:  63 years Female altered mental status, elevated white cell count    COMPARISON: 8/15/2023    FINDINGS: Cardiomediastinal silhouette is within normal limits. Lungs are normally expanded with no airspace consolidation. No pleural effusion or pneumothorax. No acute osseous abnormality.    IMPRESSION: No acute pulmonary process.    Electronically signed by:  Tammy Duncan MD  10/24/2023 02:33 PM CDT Workstation: [id number]                                     Medications   vancomycin - pharmacy to dose (has no administration in time range)   meropenem 1 g in sodium chloride 0.9 % 100 mL IVPB (ready to mix system) (1 g Intravenous New Bag 10/24/23 1841)   lactated ringers bolus 500 mL (0 mLs Intravenous Stopped 10/24/23 1630)   cefepime 2 g in dextrose 5% 100 mL IVPB (ready to mix) (0 g Intravenous Stopped 10/24/23 1555)   vancomycin 1.25 g in dextrose 5% 250 mL IVPB (ready to mix) (0 mg Intravenous Stopped 10/24/23 1802)   lactated ringers bolus 1,000 mL (0 mLs Intravenous Stopped 10/24/23 1828)   iohexoL (OMNIPAQUE 350) injection 100 mL (100 mLs Intravenous Given 10/24/23 1722)     Medical Decision Making  This is a 63-year-old  female who presented here with altered mental status and concerns for sepsis. Vitals stable, afebrile, no concern for septic shock at this time.. Differential includes sepsis, UTI, pyelonephritis, abdominal abscess, bacteremia.  [place] of [address] noted, patient given broad-spectrum antibiotics with vanc and cefepime initially, however after reviewing the paperwork, it was noted that the 2nd bottle of blood cultures were resistant to cefepime and hence meropenem was added for additional coverage.  Blood cultures are currently pending.  UA does not show strong evidence of UTI at this time.  CT abdomen and pelvis and CT head are unremarkable besides mild hydronephrosis.  A sacral decubitus ulcer present but does not look erythematous or draining pus at this time.  30 cc/kilos of fluid given.    This patient does have evidence of infective focus  My overall impression is sepsis.  Source: Unknown  Antibiotics given- Antibiotics (72h ago, onward)    Start     Stop Route Frequency Ordered    10/24/23 1730  meropenem 1 g in sodium chloride 0.9 % 100 mL IVPB (ready   to mix system)         -- IV Once 10/24/23 1622    10/24/23 1601  vancomycin - pharmacy to dose  (vancomycin IVPB (PEDS and   ADULTS))        See Hyperspace for full Linked Orders Report.    -- IV pharmacy to manage frequency 10/24/23 1501      Latest lactate reviewed-  Lab             10/24/23                       1459          LACTATE      2.8*          Organ dysfunction indicated by thrombocytosis    Fluid challenge Ideal Body Weight- The patient's ideal body weight is Ideal body weight: 48.8 kg (107 lb 8.4 oz) which will be used to calculate fluid bolus of 30 ml/kg for treatment of septic shock.      Post- resuscitation assessment Yes Perfusion exam was performed within 6 hours of septic shock presentation after bolus shows Adequate tissue perfusion assessed by non-invasive monitoring       Will Not start Pressors- Levophed for MAP of 65  Source control  achieved by:  Vancomycin, cefepime, meropenem      Patient will be admitted to hospitalist team for further management and workup.       Amount and/or Complexity of Data Reviewed  Labs: ordered.  Radiology: ordered.    Risk  Prescription drug management.  Decision regarding hospitalization.              Attending Attestation:             Attending ED Notes:   I have seen and evaluated the patient at bedside and I mostly agree with the resident's history, physical examination, clinical impression, and disposition.  Please see my addendum below for further details and explanation.  I was present for the key portions of the separately billed procedures, including bedside ultrasounds.    63-year-old female with significant past medical history presents emergency above.  Patient has significant thrombocytosis, elevated white blood cell count and elevated lactic.  Patient has positive blood cultures on cultures performed at the outpatient facility.  Patient may have ESBL type organism.  She is been covered with meropenem vancomycin.  Patient has no obvious clear source at this time although she does have some bacteria in her urine.  Could be UTI.  Mathur catheter was changed out.  No obvious skin lesions on exam.  No obvious pneumonia.  CT of the abdomen and pelvis and not show any stones/infected stone.  Patient will be admitted to the hospital for further care and evaluation of symptoms.                 Medical Decision Making:   Clinical Tests:   Sepsis Perfusion Assessment: "I attest a sepsis perfusion exam was performed within 6 hours of sepsis, severe sepsis, or septic shock presentation, following fluid resuscitation."      Clinical Impression:   Final diagnoses:  [R41.82] AMS (altered mental status)  [D75.839] Thrombocytosis (Primary)  [A41.9] Sepsis, due to unspecified organism, unspecified whether acute organ dysfunction present        ED Disposition Condition    Admit Stable                Cintia Mills  MD  Resident  10/24/23 180       Lauri Causey,   10/24/23 1932

## 2023-10-24 NOTE — PHARMACY MED REC
"Admission Medication History     The home medication history was taken by Sterling Damon.    You may go to "Admission" then "Reconcile Home Medications" tabs to review and/or act upon these items.     The home medication list has been updated by the Pharmacy department.   Please read ALL comments highlighted in yellow.   Please address this information as you see fit.    Feel free to contact us if you have any questions or require assistance.      Medications listed below were obtained from: Patient/family and Analytic software- [employer]  No current facility-administered medications on file prior to encounter.     Current Outpatient Medications on File Prior to Encounter   Medication Sig Dispense Refill    amLODIPine (NORVASC) 10 MG tablet Take 1 tablet (10 mg total) by mouth once daily. 90 tablet 3    apixaban (ELIQUIS) 2.5 mg Tab 2.5 mg by Per G Tube route 2 (two) times daily.      aspirin 81 MG Chew Take 1 tablet (81 mg total) by mouth once daily. 30 tablet 0    atorvastatin (LIPITOR) 80 MG tablet Take 1 tablet (80 mg total) by mouth every evening. (Patient taking differently: 80 mg by Per G Tube route every evening.) 30 tablet 0    baclofen (LIORESAL) 5 mg Tab tablet 5 mg by Per G Tube route every 8 (eight) hours as needed (muscle spasms).      folic acid (FOLVITE) 1 MG tablet Take 1 tablet by mouth every morning.      insulin (LANTUS SOLOSTAR U-100 INSULIN) glargine 100 units/mL SubQ pen Inject 25 Units into the skin once daily.      insulin aspart U-100 (NOVOLOG) 100 unit/mL injection Inject 2 Units into the skin 3 (three) times daily before meals.      latanoprost 0.005 % ophthalmic solution Place 1 drop into both eyes once daily. (Patient taking differently: Place 1 drop into both eyes every evening.) 7.5 mL 6    levETIRAcetam (KEPPRA) 100 mg/mL Soln 500 mg by Per G Tube route 2 (two) times daily.      levoFLOXacin (LEVAQUIN) 750 MG tablet 750 mg by Per G Tube route once daily.      losartan (COZAAR) 100 MG " tablet Take 1 tablet (100 mg total) by mouth once daily. (Patient taking differently: 100 mg by Per G Tube route once daily.) 90 tablet 3    magnesium oxide (MAG-OX) 400 mg (241.3 mg magnesium) tablet Take 400 mg by mouth once daily.      metFORMIN (GLUCOPHAGE) 500 MG tablet 1,000 mg by Per G Tube route 2 (two) times daily with meals.      metoprolol tartrate (LOPRESSOR) 25 MG tablet 25 mg by Per G Tube route 2 (two) times daily.      multivitamin (THERAGRAN) per tablet 1 tablet by Per G Tube route once daily.      polyethylene glycol (GLYCOLAX) 17 gram PwPk 17 g by Per G Tube route once daily.      thiamine (VITAMIN B-1) 100 MG tablet 100 mg by Per G Tube route once daily.      cloNIDine (CATAPRES) 0.1 MG tablet Take 1 tablet (0.1 mg total) by mouth 3 (three) times daily. 90 tablet 11    EScitalopram oxalate (LEXAPRO) 10 MG tablet Take 10 mg by mouth every evening.      insulin lispro 100 unit/mL injection Inject 5 Units into the skin 3 (three) times daily.      metoprolol succinate (TOPROL-XL) 50 MG 24 hr tablet Take 1 tablet (50 mg total) by mouth once daily. 30 tablet 0    minoxidiL (LONITEN) 10 MG Tab Take 10 mg by mouth 2 (two) times daily.      tiotropium (SPIRIVA) 18 mcg inhalation capsule Inhale 1 capsule (18 mcg total) into the lungs once daily. Controller 30 capsule 0    [DISCONTINUED] blood-glucose meter (TRUE METRIX GLUCOSE METER) kit Use as instructed 1 each 0    [DISCONTINUED] lancing device (TRUEDRAW LANCING DEVICE) Misc Inject 1 Device into the skin 2 (two) times daily. 1 each 3         Sterling Damon  EXT 1926                 .

## 2023-10-25 PROBLEM — D75.839 THROMBOCYTOSIS: Status: ACTIVE | Noted: 2023-10-25

## 2023-10-25 PROBLEM — A41.9 SEVERE SEPSIS: Status: ACTIVE | Noted: 2023-10-25

## 2023-10-25 PROBLEM — D72.829 LEUKOCYTOSIS: Status: ACTIVE | Noted: 2023-10-25

## 2023-10-25 PROBLEM — D50.9 MICROCYTIC ANEMIA: Status: ACTIVE | Noted: 2023-10-25

## 2023-10-25 PROBLEM — A41.9 SEPSIS: Status: ACTIVE | Noted: 2023-10-25

## 2023-10-25 PROBLEM — L89.150 PRESSURE INJURY OF SACRAL REGION, UNSTAGEABLE: Status: ACTIVE | Noted: 2023-10-25

## 2023-10-25 PROBLEM — R65.20 SEVERE SEPSIS: Status: ACTIVE | Noted: 2023-10-25

## 2023-10-25 LAB
ALBUMIN SERPL BCP-MCNC: 2.7 G/DL (ref 3.5–5.2)
ALP SERPL-CCNC: 114 U/L (ref 55–135)
ALT SERPL W/O P-5'-P-CCNC: 14 U/L (ref 10–44)
ANION GAP SERPL CALC-SCNC: 8 MMOL/L (ref 8–16)
AST SERPL-CCNC: 17 U/L (ref 10–40)
BASOPHILS # BLD AUTO: 0.08 K/UL (ref 0–0.2)
BASOPHILS NFR BLD: 0.5 % (ref 0–1.9)
BILIRUB SERPL-MCNC: 0.4 MG/DL (ref 0.1–1)
BUN SERPL-MCNC: 20 MG/DL (ref 8–23)
CALCIUM SERPL-MCNC: 9 MG/DL (ref 8.7–10.5)
CHLORIDE SERPL-SCNC: 102 MMOL/L (ref 95–110)
CO2 SERPL-SCNC: 25 MMOL/L (ref 23–29)
CREAT SERPL-MCNC: 0.6 MG/DL (ref 0.5–1.4)
CRP SERPL-MCNC: 92.6 MG/L (ref 0–8.2)
DIFFERENTIAL METHOD BLD: ABNORMAL
EOSINOPHIL # BLD AUTO: 0.1 K/UL (ref 0–0.5)
EOSINOPHIL NFR BLD: 0.6 % (ref 0–8)
ERYTHROCYTE [DISTWIDTH] IN BLOOD BY AUTOMATED COUNT: 18.1 % (ref 11.5–14.5)
ERYTHROCYTE [SEDIMENTATION RATE] IN BLOOD BY WESTERGREN METHOD: 64 MM/HR (ref 0–20)
EST. GFR  (NO RACE VARIABLE): >60 ML/MIN/1.73 M^2
GLUCOSE SERPL-MCNC: 191 MG/DL (ref 70–110)
GLUCOSE SERPL-MCNC: 89 MG/DL (ref 70–110)
HCT VFR BLD AUTO: 22.7 % (ref 37–48.5)
HGB BLD-MCNC: 7.6 G/DL (ref 12–16)
IMM GRANULOCYTES # BLD AUTO: 0.46 K/UL (ref 0–0.04)
IMM GRANULOCYTES NFR BLD AUTO: 2.7 % (ref 0–0.5)
LACTATE SERPL-SCNC: 1 MMOL/L (ref 0.5–1.9)
LIPASE SERPL-CCNC: 63 U/L (ref 4–60)
LYMPHOCYTES # BLD AUTO: 1.7 K/UL (ref 1–4.8)
LYMPHOCYTES NFR BLD: 10 % (ref 18–48)
MAGNESIUM SERPL-MCNC: 1.8 MG/DL (ref 1.6–2.6)
MCH RBC QN AUTO: 25.5 PG (ref 27–31)
MCHC RBC AUTO-ENTMCNC: 33.5 G/DL (ref 32–36)
MCV RBC AUTO: 76 FL (ref 82–98)
MONOCYTES # BLD AUTO: 1.1 K/UL (ref 0.3–1)
MONOCYTES NFR BLD: 6.2 % (ref 4–15)
NEUTROPHILS # BLD AUTO: 13.6 K/UL (ref 1.8–7.7)
NEUTROPHILS NFR BLD: 80 % (ref 38–73)
NRBC BLD-RTO: 0 /100 WBC
PATH REV BLD -IMP: NORMAL
PLATELET # BLD AUTO: 874 K/UL (ref 150–450)
PMV BLD AUTO: 10.4 FL (ref 9.2–12.9)
POTASSIUM SERPL-SCNC: 3.8 MMOL/L (ref 3.5–5.1)
PROCALCITONIN SERPL IA-MCNC: 0.23 NG/ML (ref 0–0.5)
PROT SERPL-MCNC: 6.7 G/DL (ref 6–8.4)
RBC # BLD AUTO: 2.98 M/UL (ref 4–5.4)
SODIUM SERPL-SCNC: 135 MMOL/L (ref 136–145)
WBC # BLD AUTO: 16.96 K/UL (ref 3.9–12.7)

## 2023-10-25 PROCEDURE — 85025 COMPLETE CBC W/AUTO DIFF WBC: CPT | Performed by: INTERNAL MEDICINE

## 2023-10-25 PROCEDURE — 83605 ASSAY OF LACTIC ACID: CPT | Performed by: INTERNAL MEDICINE

## 2023-10-25 PROCEDURE — 84145 PROCALCITONIN (PCT): CPT | Performed by: INTERNAL MEDICINE

## 2023-10-25 PROCEDURE — 86140 C-REACTIVE PROTEIN: CPT | Performed by: INTERNAL MEDICINE

## 2023-10-25 PROCEDURE — 25000003 PHARM REV CODE 250: Performed by: INTERNAL MEDICINE

## 2023-10-25 PROCEDURE — 99223 1ST HOSP IP/OBS HIGH 75: CPT | Mod: ,,, | Performed by: INTERNAL MEDICINE

## 2023-10-25 PROCEDURE — 63600175 PHARM REV CODE 636 W HCPCS: Performed by: INTERNAL MEDICINE

## 2023-10-25 PROCEDURE — 83735 ASSAY OF MAGNESIUM: CPT | Performed by: INTERNAL MEDICINE

## 2023-10-25 PROCEDURE — 99221 1ST HOSP IP/OBS SF/LOW 40: CPT | Mod: ,,, | Performed by: FAMILY MEDICINE

## 2023-10-25 PROCEDURE — 12000002 HC ACUTE/MED SURGE SEMI-PRIVATE ROOM

## 2023-10-25 PROCEDURE — 80053 COMPREHEN METABOLIC PANEL: CPT | Performed by: INTERNAL MEDICINE

## 2023-10-25 PROCEDURE — 94761 N-INVAS EAR/PLS OXIMETRY MLT: CPT

## 2023-10-25 PROCEDURE — 85651 RBC SED RATE NONAUTOMATED: CPT | Performed by: INTERNAL MEDICINE

## 2023-10-25 PROCEDURE — 25500020 PHARM REV CODE 255: Performed by: INTERNAL MEDICINE

## 2023-10-25 PROCEDURE — A9585 GADOBUTROL INJECTION: HCPCS | Performed by: INTERNAL MEDICINE

## 2023-10-25 PROCEDURE — 36415 COLL VENOUS BLD VENIPUNCTURE: CPT | Performed by: INTERNAL MEDICINE

## 2023-10-25 PROCEDURE — 25000003 PHARM REV CODE 250: Performed by: STUDENT IN AN ORGANIZED HEALTH CARE EDUCATION/TRAINING PROGRAM

## 2023-10-25 PROCEDURE — 99223 1ST HOSP IP/OBS HIGH 75: CPT | Mod: ,,, | Performed by: STUDENT IN AN ORGANIZED HEALTH CARE EDUCATION/TRAINING PROGRAM

## 2023-10-25 PROCEDURE — 63600175 PHARM REV CODE 636 W HCPCS: Performed by: STUDENT IN AN ORGANIZED HEALTH CARE EDUCATION/TRAINING PROGRAM

## 2023-10-25 PROCEDURE — 99900035 HC TECH TIME PER 15 MIN (STAT)

## 2023-10-25 PROCEDURE — 83690 ASSAY OF LIPASE: CPT | Performed by: INTERNAL MEDICINE

## 2023-10-25 RX ORDER — GLUCAGON 1 MG
1 KIT INJECTION
Status: DISCONTINUED | OUTPATIENT
Start: 2023-10-25 | End: 2023-10-27 | Stop reason: HOSPADM

## 2023-10-25 RX ORDER — IBUPROFEN 200 MG
16 TABLET ORAL
Status: DISCONTINUED | OUTPATIENT
Start: 2023-10-25 | End: 2023-10-27 | Stop reason: HOSPADM

## 2023-10-25 RX ORDER — INSULIN ASPART 100 [IU]/ML
0-10 INJECTION, SOLUTION INTRAVENOUS; SUBCUTANEOUS
Status: DISCONTINUED | OUTPATIENT
Start: 2023-10-25 | End: 2023-10-27 | Stop reason: HOSPADM

## 2023-10-25 RX ORDER — IPRATROPIUM BROMIDE 0.5 MG/2.5ML
0.5 SOLUTION RESPIRATORY (INHALATION) EVERY 6 HOURS PRN
Status: DISCONTINUED | OUTPATIENT
Start: 2023-10-25 | End: 2023-10-27 | Stop reason: HOSPADM

## 2023-10-25 RX ORDER — SODIUM CHLORIDE, SODIUM LACTATE, POTASSIUM CHLORIDE, CALCIUM CHLORIDE 600; 310; 30; 20 MG/100ML; MG/100ML; MG/100ML; MG/100ML
INJECTION, SOLUTION INTRAVENOUS CONTINUOUS
Status: DISCONTINUED | OUTPATIENT
Start: 2023-10-25 | End: 2023-10-25

## 2023-10-25 RX ORDER — IPRATROPIUM BROMIDE 0.5 MG/2.5ML
0.5 SOLUTION RESPIRATORY (INHALATION) EVERY 6 HOURS
Status: DISCONTINUED | OUTPATIENT
Start: 2023-10-25 | End: 2023-10-25

## 2023-10-25 RX ORDER — GADOBUTROL 604.72 MG/ML
5 INJECTION INTRAVENOUS
Status: COMPLETED | OUTPATIENT
Start: 2023-10-25 | End: 2023-10-25

## 2023-10-25 RX ORDER — IBUPROFEN 200 MG
24 TABLET ORAL
Status: DISCONTINUED | OUTPATIENT
Start: 2023-10-25 | End: 2023-10-27 | Stop reason: HOSPADM

## 2023-10-25 RX ADMIN — INSULIN ASPART 1 UNITS: 100 INJECTION, SOLUTION INTRAVENOUS; SUBCUTANEOUS at 09:10

## 2023-10-25 RX ADMIN — POLYETHYLENE GLYCOL 3350 17 G: 17 POWDER, FOR SOLUTION ORAL at 09:10

## 2023-10-25 RX ADMIN — CLONIDINE HYDROCHLORIDE 0.1 MG: 0.1 TABLET ORAL at 10:10

## 2023-10-25 RX ADMIN — FOLIC ACID 1000 MCG: 1 TABLET ORAL at 06:10

## 2023-10-25 RX ADMIN — MINOXIDIL 10 MG: 2.5 TABLET ORAL at 09:10

## 2023-10-25 RX ADMIN — MEROPENEM 1 G: 1 INJECTION, POWDER, FOR SOLUTION INTRAVENOUS at 06:10

## 2023-10-25 RX ADMIN — THERA TABS 1 TABLET: TAB at 09:10

## 2023-10-25 RX ADMIN — CEFTRIAXONE SODIUM 2 G: 2 INJECTION, POWDER, FOR SOLUTION INTRAMUSCULAR; INTRAVENOUS at 01:10

## 2023-10-25 RX ADMIN — ASPIRIN 81 MG 81 MG: 81 TABLET ORAL at 09:10

## 2023-10-25 RX ADMIN — CLONIDINE HYDROCHLORIDE 0.1 MG: 0.1 TABLET ORAL at 04:10

## 2023-10-25 RX ADMIN — LEVETIRACETAM 500 MG: 100 SOLUTION ORAL at 09:10

## 2023-10-25 RX ADMIN — METOPROLOL TARTRATE 25 MG: 25 TABLET, FILM COATED ORAL at 09:10

## 2023-10-25 RX ADMIN — MUPIROCIN 1 G: 20 OINTMENT TOPICAL at 09:10

## 2023-10-25 RX ADMIN — THIAMINE HCL TAB 100 MG 100 MG: 100 TAB at 09:10

## 2023-10-25 RX ADMIN — ATORVASTATIN CALCIUM 80 MG: 40 TABLET, FILM COATED ORAL at 10:10

## 2023-10-25 RX ADMIN — METOPROLOL TARTRATE 25 MG: 25 TABLET, FILM COATED ORAL at 10:10

## 2023-10-25 RX ADMIN — CLONIDINE HYDROCHLORIDE 0.1 MG: 0.1 TABLET ORAL at 09:10

## 2023-10-25 RX ADMIN — SODIUM CHLORIDE, SODIUM LACTATE, POTASSIUM CHLORIDE, AND CALCIUM CHLORIDE: .6; .31; .03; .02 INJECTION, SOLUTION INTRAVENOUS at 02:10

## 2023-10-25 RX ADMIN — ESCITALOPRAM OXALATE 10 MG: 10 TABLET ORAL at 10:10

## 2023-10-25 RX ADMIN — APIXABAN 2.5 MG: 2.5 TABLET, FILM COATED ORAL at 10:10

## 2023-10-25 RX ADMIN — VANCOMYCIN HYDROCHLORIDE 750 MG: 750 INJECTION, POWDER, LYOPHILIZED, FOR SOLUTION INTRAVENOUS at 04:10

## 2023-10-25 RX ADMIN — AMLODIPINE BESYLATE 10 MG: 5 TABLET ORAL at 09:10

## 2023-10-25 RX ADMIN — APIXABAN 2.5 MG: 2.5 TABLET, FILM COATED ORAL at 09:10

## 2023-10-25 RX ADMIN — LATANOPROST 1 DROP: 50 SOLUTION OPHTHALMIC at 10:10

## 2023-10-25 RX ADMIN — GADOBUTROL 5 ML: 604.72 INJECTION INTRAVENOUS at 04:10

## 2023-10-25 RX ADMIN — MUPIROCIN 1 G: 20 OINTMENT TOPICAL at 10:10

## 2023-10-25 NOTE — PLAN OF CARE
[facility]  Initial Discharge Assessment       Primary Care Provider: Cristina Ren MD    Admission Diagnosis: Thrombocytosis [D75.839]    Admission Date: 10/24/2023  Expected Discharge Date: 10/27/2023    DC assessment completed. Pt is a resident at [facility] and the plan is to return once medically clear. CM to follow for additional dc needs    Transition of Care Barriers: None    Payor: MEDICARE / Plan: MEDICARE PART A & B / Product Type: Government /     Extended Emergency Contact Information  Primary Emergency Contact: Leonie Johnson  Mobile Phone: [phone]  Relation: Daughter  Preferred language: English   needed? No  Secondary Emergency Contact: FigueroaNish  [place] Phone: [phone]  Mobile Phone: [phone]  Relation: Friend   needed? No    Discharge Plan A: Return to nursing home  Discharge Plan B: Return to Nursing Home      [facility] - [facility] [address] [facility]  [address] [facility] [address]  Phone: [phone] Fax: [phone]    [facility] [address] [address]  Phone: [phone] Fax: [phone]    [id number] [address]  AT [facility] & [address] [address]  Phone: [phone] Fax: [phone]      Initial Assessment (most recent)       Adult Discharge Assessment - 10/25/23 1030          Discharge Assessment    Assessment Type Discharge Planning Assessment     Confirmed/corrected address, phone number and insurance Yes     Confirmed Demographics Correct on Facesheet     Source of Information patient;family;health record     People in Home facility resident     Do you expect to return to your current living situation? Yes     Prior to hospitilization cognitive status: Unable to Assess     Current cognitive status: Not Oriented to Time     Walking or Climbing Stairs transferring difficulty,  dependent     Dressing/Bathing dressing difficulty, dependent     Readmission within 30 days? No     Patient currently being followed by outpatient case management? No     Do you take prescription medications? Yes     Do you have prescription coverage? Yes     Do you have any problems affording any of your prescribed medications? No     Is the patient taking medications as prescribed? yes     How do you get to doctors appointments? health plan transportation     Are you on dialysis? No     Do you take coumadin? No     DME Needed Upon Discharge  none     Discharge Plan discussed with: Patient     Transition of Care Barriers None     Discharge Plan A Return to nursing home     Discharge Plan B Return to Nursing Home

## 2023-10-25 NOTE — CONSULTS
Right upper thigh/knee pink wound bed 1.5x2x0.1cm stage 2  Sacral unstageable 80% yellow slough covering wound bed 20% pink wound bed,  with pink periwound.  Cleaned and dried and applied Medihoney, covered with non stick dressing.  Pink Scarring to right lower leg. Peg site without redness or drainage. Notified Dr. Sanders of above.

## 2023-10-25 NOTE — CONSULTS
[facility]   Department of Infectious Disease  Consult Note        PATIENT NAME: Steff Johnson  MRN: [id number]  TODAY'S DATE: 10/25/2023  ADMIT DATE: 10/24/2023    CHIEF COMPLAINT: Abnormal Labs    PRINCIPLE PROBLEM: Severe sepsis    REASON FOR CONSULT: Bacteremia     ASSESSMENT and PLAN     Sepsis, secondary to polymicrobial bacteremia; Enterococcus spp, Proteus, and Klebsiella spp, s/p Levaquin x 1 likely from small unstageable sacral wound vs complicated UTI in the setting of chronic Mathur and R mild hydronephrosis   ESR 64, CRP 92.6  Blood cultures x2 sets no growth to date, pending final  UA pH >8, WBC 10, pending culture    Thombocytosis    PMHx:  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, dysphagia, residual left weakness, and plegia upper and lower extremities, COPD, diabetes, hypertension, and ongoing sacral wound     Recommendations:    Cultures from facility to be faxed to the office   Urology for right hydronephrosis in the setting of polymicrobial bacteremia, no evidence of stones on CT scan  Wound care for sacral decubitus, please the right and send tissue for Gram stain and cultures   MRI sacrum with and without contrast to evaluate for osteomyelitis  Stop meropenem IV   Continue vancomycin IV, keep level 15-20, will cover GPCs including Staph aureus and Enterococcus spp   Rocephin 2 g IV daily  Follow blood and urine cultures  Aspiration precautions  Enhanced contact precautions/resident of [facility]    Thank you for this consult. Please send [employer] secure chat with any questions.    HPI      Steff Johnson is a 63 y.o. 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  [facility] contacted, discussed with nurse practitioner who was taking care of the patient at facility.  He reports the patient was just transferred there 2-3 weeks ago, he reports during regular blood work they noticed leukocytosis and persistent thrombocytosis about a 1000.  They performed chest x-ray, UA, sputum and all of this were unrevealing.  Blood cultures grew Enterococcus and Klebsiella species, sensitivities unknown, results to be faxed to the office.  They also performed debridement of her sacral ulcer and cultures are pending, x-ray as per NP was negative for acute process.  She was empirically treated with Levaquin x1 dose, she was referred to the hospital for PICC line access for her to continue IV antibiotics.    Unable to obtain further history from the patient, she is nonverbal at baseline   In the ER, hypertensive 166/94, T-max 99°   Labs in the ER, leukocytosis 17.9, left shift 77.4%, H&H 8.8/25.9, MCV 76, platelet count a 1147, thrombocytosis   ESR 64, CRP 92.6, lipase 61 repeat 63   Lactic acid 2.8   Procalcitonin 0.2, negative   U tox negative  UA pH greater than 8, trace leukocytes, pyuria 10, rare bacteria, micro lab contacted for culture   Chest x-ray clear   CT head negative for acute process, atrophy and severe chronic white matter ischemic changes.  CT abdomen pelvis with mild right hydronephrosis.  The urinary bladder is decompressed by Mathur catheter.  No gross renal or ureteral calculi are seen.  No obvious fluid collection is apparent.  CT chest no acute intrathoracic abnormality   Abdominal ultrasound marked hepatic heterogeneity which may due to variation in fatty content.  Mild right hydronephrosis.      Empirically started on vancomycin and meropenem IV.    Outdoor activities:  Resident of [facility], bed-bound, nonverbal at baseline.  Travel:  None  Implants:  None  Antibiotic history:  Levofloxacin x1 at facility.    Past Medical History:    Diagnosis Date    Arthritis     Complete tear of left rotator cuff 11/09/2017    COPD (chronic obstructive pulmonary disease)     COVID-19 infection 07/02/2021    Diabetes mellitus     H/o Cerebrovascular accident (CVA) of left pontine structure 12/12/2019    Hypertension     Personal history of colonic polyps     Stroke     Wears glasses        Past Surgical History:   Procedure Laterality Date    COLONOSCOPY N/A 4/11/2017    Procedure: COLONOSCOPY;  Surgeon: Jared Antonio MD;  Location: [facility] ENDO;  Service: Endoscopy;  Laterality: N/A;    HYSTERECTOMY      OOPHORECTOMY      SHOULDER SURGERY      R    TRANSESOPHAGEAL ECHOCARDIOGRAM WITH POSSIBLE CARDIOVERSION (GT W/ POSS CARDIOVERSION) N/A 5/4/2023    Procedure: Transesophageal echo (GT) intra-procedure log documentation;  Surgeon: Blade Phillip MD;  Location: [facility] CATH/EP LAB;  Service: Cardiology;  Laterality: N/A;       Family History   Problem Relation Age of Onset    Diabetes Mother     Asthma Mother     Hypertension Mother     Diabetes Father     Diabetes Brother     Hypertension Brother     Anemia Daughter     Glaucoma Neg Hx     Macular degeneration Neg Hx     Retinal detachment Neg Hx        Social History     Socioeconomic History    Marital status: Single   Occupational History     Comment: disabled due to shoulder problems   Tobacco Use    Smoking status: Never    Smokeless tobacco: Never   Substance and Sexual Activity    Alcohol use: No    Drug use: No    Sexual activity: Not Currently     Social Determinants of Health     Financial Resource Strain: Low Risk  (4/27/2023)    Overall Financial Resource Strain (CARDIA)     Difficulty of Paying Living Expenses: Not very hard   Food Insecurity: No Food Insecurity (4/27/2023)    Hunger Vital Sign     Worried About Running Out of Food in the Last Year: Never true     Ran Out of Food in the Last Year: Never true   Transportation Needs: No Transportation Needs (4/27/2023)    PRAPARE -  Transportation     Lack of Transportation (Medical): No     Lack of Transportation (Non-Medical): No   Physical Activity: Inactive (4/27/2023)    Exercise Vital Sign     Days of Exercise per Week: 0 days     Minutes of Exercise per Session: 0 min   Stress: No Stress Concern Present (4/27/2023)    [employer] [place] [employer] - Occupational Stress Questionnaire     Feeling of Stress : Not at all   Social Connections: Moderately Integrated (4/27/2023)    Social Connection and Isolation Panel [NHANES]     Frequency of Communication with Friends and Family: More than three times a week     Frequency of Social Gatherings with Friends and Family: More than three times a week     Attends [employer] Services: More than 4 times per year     Active Member of Clubs or Organizations: No     Attends Club or Organization Meetings: Never     Marital Status: Living with partner   Housing Stability: Low Risk  (4/27/2023)    Housing Stability Vital Sign     Unable to Pay for Housing in the Last Year: No     Number of Places Lived in the Last Year: 1     Unstable Housing in the Last Year: No       Review of patient's allergies indicates:  No Known Allergies    Current Outpatient Medications   Medication Instructions    amLODIPine (NORVASC) 10 mg, Oral, Daily    apixaban (ELIQUIS) 2.5 mg, Per G Tube, 2 times daily    aspirin 81 mg, Oral, Daily    atorvastatin (LIPITOR) 80 mg, Oral, Nightly    baclofen (LIORESAL) 5 mg, Per G Tube, Every 8 hours PRN    cloNIDine (CATAPRES) 0.1 mg, Oral, 3 times daily    EScitalopram oxalate (LEXAPRO) 10 mg, Oral, Nightly    folic acid (FOLVITE) 1 MG tablet 1 tablet, Oral, Every morning    insulin (LANTUS SOLOSTAR U-100 INSULIN) 25 Units, Subcutaneous, Daily    insulin aspart U-100 (NOVOLOG) 2 Units, Subcutaneous, 3 times daily before meals    insulin lispro 5 Units, Subcutaneous, 3 times daily    latanoprost 0.005 % ophthalmic solution 1 drop, Both Eyes, Daily    levETIRAcetam (KEPPRA) 500 mg,  Per G Tube, 2 times daily    levoFLOXacin (LEVAQUIN) 750 mg, Per G Tube, Daily    losartan (COZAAR) 100 mg, Oral, Daily    magnesium oxide (MAG-OX) 400 mg, Oral, Daily    metFORMIN (GLUCOPHAGE) 1,000 mg, Per G Tube, 2 times daily with meals    metoprolol succinate (TOPROL-XL) 50 mg, Oral, Daily    metoprolol tartrate (LOPRESSOR) 25 mg, Per G Tube, 2 times daily    minoxidiL (LONITEN) 10 mg, Oral, 2 times daily    multivitamin (THERAGRAN) per tablet 1 tablet, Per G Tube, Daily    polyethylene glycol (GLYCOLAX) 17 g, Per G Tube, Daily    SPIRIVA WITH HANDIHALER 18 mcg, Inhalation, Daily, Controller    thiamine (VITAMIN B-1) 100 mg, Per G Tube, Daily         Review of Systems    Unable to obtain, patient is nonverbal at baseline       OBJECTIVE     Temp:  [97 °F (36.1 °C)-99 °F (37.2 °C)] 97.8 °F (36.6 °C)  Pulse:  [] 67  Resp:  [18-24] 20  SpO2:  [89 %-100 %] 100 %  BP: (109-178)/(59-94) 128/66         Physical Exam    General:  Chronically ill-appearing,  female, with generalized muscle wasting, staring in the room  Eyes: Eyes with no icterus or injection. Vision grossly normal  Ears: Hearing grossly normal.  Nose: Nares patent  Mouth:  Limited, unable to fully open mouth  Neck: Supple, no tenderness to palpation.  Cardiovascular: Regular rate and rhythm, no murmurs, no edema.    Respiratory:  Clear to auscultation bilaterally, no tachypnea or increased work of breathing.  Gastrointestinal:  Soft with active bowel sounds, no tenderness to palpation, no distention.  Peg tube in place, ostomy looks clean.  Genitourinary:  Mathur catheter, yellow urine, some sediment noted  Musculoskeletal:  Generalized muscle wasting, left plegia, weakness   Skin:  Warm and dry, no obvious rashes.  Small stage IV sacral ulcer, with fibrinous slough, as per pictures, see below  Neuro:   Nonverbal at baseline, staring in the room, not following commands  Psych:  Calm    Wounds:   10/25:      VAD:  Peripheral  "IV  ISOLATION: Enhanced contact/Janie resident    CBC LAST 7  Recent Labs   Lab 10/24/23  1459 10/25/23  0522   WBC 17.99* 16.96*   RBC 3.41* 2.98*   HGB 8.8* 7.6*   HCT 25.9* 22.7*   MCV 76* 76*   MCH 25.8* 25.5*   MCHC 34.0 33.5   RDW 17.7* 18.1*   PLT 1,147* 874*   MPV 9.3 10.4   GRAN 77.4*  14.0* 80.0*  13.6*   LYMPH 12.5*  2.2 10.0*  1.7   MONO 6.4  1.2* 6.2  1.1*   BASO 0.07 0.08   NRBC 0 0       CHEMISTRY LAST 7  Recent Labs   Lab 10/24/23  1459 10/25/23  0522    135*   K 4.4 3.8    102   CO2 25 25   ANIONGAP 10 8   BUN 29* 20   CREATININE 0.6 0.6   GLU 88 89   CALCIUM 9.2 9.0   MG  --  1.8   ALBUMIN 3.1* 2.7*   PROT 7.6 6.7   ALKPHOS 128 114   ALT 17 14   AST 18 17   BILITOT 0.4 0.4       Estimated Creatinine Clearance: 72 mL/min (based on SCr of 0.6 mg/dL).    INFLAMMATORY/PROCAL  LAST 7  Recent Labs   Lab 10/25/23  0522   PROCAL 0.226   CRP 92.6*     No results found for: "ESR"  CRP   Date Value Ref Range Status   10/25/2023 92.6 (H) 0.0 - 8.2 mg/L Final   06/15/2023 0.65 <0.76 mg/dL Final   11/23/2021 3.21 (H) <0.76 mg/dL Final   07/04/2021 0.34 <0.76 mg/dL Final   07/03/2021 0.87 (H) <0.76 mg/dL Final   07/02/2021 1.97 (H) <0.76 mg/dL Final       PRIOR  MICROBIOLOGY:    Susceptibility data from last 90 days.  Collected Specimen Info Organism   08/15/23 Blood No growth after 5 days.     Wound cultures right leg from December 2022 MRSA    LAST 7 DAYS MICROBIOLOGY   Microbiology Results (last 7 days)       Procedure Component Value Units Date/Time    Urine culture [[id number]] Collected: 10/24/23 1430    Order Status: No result Specimen: Urine from Clean Catch Updated: 10/25/23 0922    Urine Culture High Risk [[id number]]     Order Status: Completed Specimen: Urine, Catheterized     Blood culture #1 **CANNOT BE ORDERED STAT** [[id number]] Collected: 10/24/23 1419    Order Status: Completed Specimen: Blood from Antecubital, Right Updated: 10/24/23 2317     Blood Culture, Routine No Growth " to date    Blood culture #2 **CANNOT BE ORDERED STAT** [[id number]] Collected: 10/24/23 1418    Order Status: Completed Specimen: Blood from Antecubital, Left Updated: 10/24/23 2317     Blood Culture, Routine No Growth to date    Urine culture High Risk **CANNOT BE ORDERED STAT** [[id number]]     Order Status: No result Specimen: Urine, Catheterized               CURRENT/PREVIOUS VISIT EKG  Results for orders placed or performed during the hospital encounter of 10/24/23   EKG 12-lead    Collection Time: 10/24/23  2:57 PM    Narrative    Test Reason : R41.82,    Vent. Rate : 101 BPM     Atrial Rate : 101 BPM     P-R Int : 128 ms          QRS Dur : 056 ms      QT Int : 356 ms       P-R-T Axes : 034 020 064 degrees     QTc Int : 461 ms    Sinus tachycardia  Cannot rule out Anterior infarct ,age undetermined  Abnormal ECG  When compared with ECG of 12-AUG-2023 01:24,  Vent. rate has increased BY  43 BPM    Referred By: [email or web address]   SELF           Confirmed By:        Significant Labs: All pertinent labs within the past 24 hours have been reviewed.     Significant Imaging: I have reviewed all relevant and available imaging results/findings within the past 24 hours.  CXR: clear     CT head: negative for acute process, atrophy and severe chronic white matter ischemic changes.    CT chest: no acute intrathoracic abnormality     CT abdomen/pelvis: with mild right hydronephrosis.  The urinary bladder is decompressed by Mathur catheter.  No gross renal or ureteral calculi are seen.  No obvious fluid collection is apparent.    Abdominal US: Marked hepatic heterogeneity which may be due to variations in fatty content. Mild right hydronephrosis.     Rae Patel MD  Date of Service: 10/25/2023  12:46 PM

## 2023-10-25 NOTE — HOSPITAL COURSE
Pt got admitted with sepsis after blood culture done in facility where she stays grew Enterococcus spp, Proteus, and Klebsiella  Repeat BC were normal  Pt was evaluated by ID MD/Wound care MD/Hematologist  and Urologist  MRI Sacrum/Coccyx were normal  Pt had acute on chronic anemia with no evidence of bleed and received one unit Prbc   Later pt was discharged back to facility with short term course of PO amoxicillin

## 2023-10-25 NOTE — SUBJECTIVE & OBJECTIVE
Interval History:       Review of Systems   Unable to perform ROS: Patient nonverbal     Objective:     Vital Signs (Most Recent):  Temp: 97.8 °F (36.6 °C) (10/25/23 1200)  Pulse: 67 (10/25/23 1200)  Resp: 20 (10/25/23 1200)  BP: 128/66 (10/25/23 1200)  SpO2: 100 % (10/25/23 1200) Vital Signs (24h Range):  Temp:  [97 °F (36.1 °C)-99 °F (37.2 °C)] 97.8 °F (36.6 °C)  Pulse:  [] 67  Resp:  [18-24] 20  SpO2:  [89 %-100 %] 100 %  BP: (109-178)/(59-94) 128/66     Weight: 56.5 kg (124 lb 9 oz)  Body mass index is 25.16 kg/m².    Intake/Output Summary (Last 24 hours) at 10/25/2023 1408  Last data filed at 10/25/2023 0900  Gross per 24 hour   Intake 0 ml   Output 300 ml   Net -300 ml         Physical Exam  Vitals and nursing note reviewed.   Constitutional:       General: She is not in acute distress.     Appearance: Normal appearance.   HENT:      Head: Atraumatic.      Right Ear: External ear normal.      Left Ear: External ear normal.      Nose: Nose normal.      Mouth/Throat:      Mouth: Mucous membranes are dry.   Eyes:      Extraocular Movements: Extraocular movements intact.   Cardiovascular:      Rate and Rhythm: Normal rate.   Pulmonary:      Effort: Pulmonary effort is normal.   Musculoskeletal:      Cervical back: Normal range of motion.      Right lower leg: No edema.      Left lower leg: No edema.   Skin:     General: Skin is dry.   Neurological:      Mental Status: Mental status is at baseline.             Significant Labs: All pertinent labs within the past 24 hours have been reviewed.  CBC:   Recent Labs   Lab 10/24/23  1459 10/25/23  0522   WBC 17.99* 16.96*   HGB 8.8* 7.6*   HCT 25.9* 22.7*   PLT 1,147* 874*     CMP:   Recent Labs   Lab 10/24/23  1459 10/25/23  0522    135*   K 4.4 3.8    102   CO2 25 25   GLU 88 89   BUN 29* 20   CREATININE 0.6 0.6   CALCIUM 9.2 9.0   PROT 7.6 6.7   ALBUMIN 3.1* 2.7*   BILITOT 0.4 0.4   ALKPHOS 128 114   AST 18 17   ALT 17 14   ANIONGAP 10 8        Significant Imaging: I have reviewed all pertinent imaging results/findings within the past 24 hours.

## 2023-10-25 NOTE — CONSULTS
Interprofessional Telephone CONSULT Note  Date of Service: 10/25/2023  Patient's location: [facility] or    Specialty Consulted: Urology  Staff Consulted: Lauren Viera MD  Reason for Consult: R hydro     Time spent reviewing records, making plan and formulating plan: 31 min. More than half the time was devoted to medical consultative verbal/internet discussion with nursing and consulting physician. The purpose of this note was to treat and evaluate patient and not to arrange a transfer of care or other face to face service.   .    This service was not originating from a related E/M service provided within the previous 7 days nor will  to an E/M service or procedure within the next 24 hours or my soonest available appointment.      Both a written plan and a verbal/internet discussion were discussed with the following physician consultant: MD Katerin  99050 5-10 min  13402 11 to 20 min  28905 21 to 30 min  [id number] 31 min            [facility] Consult Note - [facility]  Staff: MD Maximiliano  Group:Maximiliano/Dennise/Bhanu/Guille/Hussain  Referring provider and please cc:   PCP: Cristina Ren MD    Subjective:      Initial consult by me in hospital on 10/25/23:  Steff Johnson is a 63 y.o. female [place] nursing home patient who is nonverbal at baseline.   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  She was sent from [facility] for positive blood cultures found when she had leukocytosis and thrombocytosis.  Blood cultures grew Enterococcus and Klebsiella.  On adamant in her voided urine only showed trace leukocytes, 10 WBC and rare bacteria.  Culture still pending from the urine.  CT abdomen pelvis with contrast showed mild right hydro to the distal ureter.  Urology was consulted for this  Independent review of the CT shows that she  has a dilated ureter down to the bladder more on the right than on the left  Creatinine normal 0.6, white count 17        Ctap with 10/24/23      LABS REVIEW:  Urine history:         Current REVIEW OF SYSTEMS:  Negative for the remaining 12 points of ROS except for as stated above       PMHx:  Past Medical History:   Diagnosis Date    Arthritis     Complete tear of left rotator cuff 11/09/2017    COPD (chronic obstructive pulmonary disease)     COVID-19 infection 07/02/2021    Diabetes mellitus     H/o Cerebrovascular accident (CVA) of left pontine structure 12/12/2019    Hypertension     Personal history of colonic polyps     Stroke     Wears glasses          PSHx:  Past Surgical History:   Procedure Laterality Date    COLONOSCOPY N/A 4/11/2017    Procedure: COLONOSCOPY;  Surgeon: Jared Antonio MD;  Location: [facility];  Service: Endoscopy;  Laterality: N/A;    HYSTERECTOMY      OOPHORECTOMY      SHOULDER SURGERY      R    TRANSESOPHAGEAL ECHOCARDIOGRAM WITH POSSIBLE CARDIOVERSION (GT W/ POSS CARDIOVERSION) N/A 5/4/2023    Procedure: Transesophageal echo (GT) intra-procedure log documentation;  Surgeon: Blade Phillip MD;  Location: [facility] CATH/EP LAB;  Service: Cardiology;  Laterality: N/A;       Fam Hx:  Reviewed- pertinent family hx as above    Soc Hx:  Social History     Tobacco Use    Smoking status: Never    Smokeless tobacco: Never   Substance Use Topics    Alcohol use: No    Drug use: No       Allergies:  Patient has no known allergies.    Anticoagulation/Aspirin:     Objective:     Vitals:    10/25/23 1200   BP: 128/66   Pulse: 67   Resp: 20   Temp: 97.8 °F (36.6 °C)         Pertinent urologic PATHOLOGY AND REVIEW:  See hpi for recent      Assessment:     Steff Johnson is a 63 y.o. female with   1. Thrombocytosis    2. AMS (altered mental status)    3. Sepsis, due to unspecified organism, unspecified whether acute organ dysfunction present    4. Chest pain        urology consulted for:  Mild R  hydro     Her mild right hydroureteronephrosis could be due to neurogenic bladder and reflux versus obstruction from poorly compliant bladder the can result from stroke over the years.  With negative urinalysis and normal kidney function will  Continue to monitor.  With a history of stroke to help prevent poor compliance of her bladder would start her on oxybutynin or another type of overactive bladder medication and stay on this indefinitely.    Plan:     No urologic procedure indicated currently  Repeat a kidney ultrasound in 3 months follow-up after  If patient is admitted for another reason should have an ultrasound at least in 3 months      Lauren Viera MD

## 2023-10-25 NOTE — PROGRESS NOTES
[facility] Medicine  Progress Note    Patient Name: Steff Johnson  MRN: [id number]  Patient Class: IP- Inpatient   Admission Date: 10/24/2023  Length of Stay: 1 days  Attending Physician: Roger Berry MD  Primary Care Provider: Cristina Ren MD        Subjective:     Principal Problem:Sepsis        HPI:  No notes on file    Overview/Hospital Course:  10/25  Pt stable  Not in distress  On baseline pt is aphasic       Interval History:       Review of Systems   Unable to perform ROS: Patient nonverbal     Objective:     Vital Signs (Most Recent):  Temp: 97.8 °F (36.6 °C) (10/25/23 1200)  Pulse: 67 (10/25/23 1200)  Resp: 20 (10/25/23 1200)  BP: 128/66 (10/25/23 1200)  SpO2: 100 % (10/25/23 1200) Vital Signs (24h Range):  Temp:  [97 °F (36.1 °C)-99 °F (37.2 °C)] 97.8 °F (36.6 °C)  Pulse:  [] 67  Resp:  [18-24] 20  SpO2:  [89 %-100 %] 100 %  BP: (109-178)/(59-94) 128/66     Weight: 56.5 kg (124 lb 9 oz)  Body mass index is 25.16 kg/m².    Intake/Output Summary (Last 24 hours) at 10/25/2023 1408  Last data filed at 10/25/2023 0900  Gross per 24 hour   Intake 0 ml   Output 300 ml   Net -300 ml         Physical Exam  Vitals and nursing note reviewed.   Constitutional:       General: She is not in acute distress.     Appearance: Normal appearance.   HENT:      Head: Atraumatic.      Right Ear: External ear normal.      Left Ear: External ear normal.      Nose: Nose normal.      Mouth/Throat:      Mouth: Mucous membranes are dry.   Eyes:      Extraocular Movements: Extraocular movements intact.   Cardiovascular:      Rate and Rhythm: Normal rate.   Pulmonary:      Effort: Pulmonary effort is normal.   Musculoskeletal:      Cervical back: Normal range of motion.      Right lower leg: No edema.      Left lower leg: No edema.   Skin:     General: Skin is dry.   Neurological:      Mental Status: Mental status is at baseline.             Significant Labs: All pertinent labs within the past 24 hours  have been reviewed.  CBC:   Recent Labs   Lab 10/24/23  1459 10/25/23  0522   WBC 17.99* 16.96*   HGB 8.8* 7.6*   HCT 25.9* 22.7*   PLT 1,147* 874*     CMP:   Recent Labs   Lab 10/24/23  1459 10/25/23  0522    135*   K 4.4 3.8    102   CO2 25 25   GLU 88 89   BUN 29* 20   CREATININE 0.6 0.6   CALCIUM 9.2 9.0   PROT 7.6 6.7   ALBUMIN 3.1* 2.7*   BILITOT 0.4 0.4   ALKPHOS 128 114   AST 18 17   ALT 17 14   ANIONGAP 10 8       Significant Imaging: I have reviewed all pertinent imaging results/findings within the past 24 hours.      Assessment/Plan:      * Sepsis  Sepsis, secondary to polymicrobial bacteremia;   Enterococcus spp, Proteus, and Klebsiella   Maintain iv abx  ID on Board  Follow up MRI Sacrum results    CVA (cerebral vascular accident)  H/o CVA  Pt has history of  stroke with expressive aphasia, right upper extremity paralysis,      Thrombocytosis  Mostly  from infection  Hematology Consulted       Aphasia  Chronic issue       Cognitive communication deficit  Chronic issue       Type 2 diabetes mellitus with both eyes affected by moderate nonproliferative retinopathy and macular edema, with long-term current use of insulin  Maintain SSI    Hypertension associated with diabetes  Aware         VTE Risk Mitigation (From admission, onward)         Ordered     apixaban tablet 2.5 mg  2 times daily         10/24/23 2131     IP VTE HIGH RISK PATIENT  Once         10/24/23 2053     Place sequential compression device  Until discontinued         10/24/23 2053                Discharge Planning   ALEX: 10/27/2023     Code Status: Full Code   Is the patient medically ready for discharge?:     Reason for patient still in hospital (select all that apply): Treatment  Discharge Plan A: Return to nursing home                  Roger Berry MD  Department of Hospital Medicine   [facility]

## 2023-10-25 NOTE — NURSING
Nurses Note -- 4 Eyes      10/25/2023   12:40 AM      Skin assessed during: Admit      [] No Altered Skin Integrity Present    []Prevention Measures Documented      [x] Yes- Altered Skin Integrity Present or Discovered   [x] LDA Added if Not in Epic (Describe Wound)   [x] New Altered Skin Integrity was Present on Admit and Documented in LDA   [x] Wound Image Taken    Wound Care Consulted? Yes    Attending Nurse:  Amy BAUTISTA RN    Second RN/Staff Member:   [email or web address]

## 2023-10-25 NOTE — NURSING
Arrived to floor. Transferred to bed x 3 asist. Oriented to room and call light. Side rails raised x2. All needs met at this time.

## 2023-10-25 NOTE — CARE UPDATE
10/25/23 0722   Patient Assessment/Suction   Level of Consciousness (AVPU) alert   Respiratory Effort Normal;Unlabored   Expansion/Accessory Muscles/Retractions no use of accessory muscles;no retractions;expansion symmetric   All Lung Fields Breath Sounds clear   Rhythm/Pattern, Respiratory unlabored;pattern regular;depth regular;chest wiggle adequate;no shortness of breath reported   Cough Frequency no cough   PRE-TX-O2   Device (Oxygen Therapy) room air   SpO2 100 %   Pulse Oximetry Type Intermittent   $ Pulse Oximetry - Multiple Charge Pulse Oximetry - Multiple   Pulse 80   Resp 19   Aerosol Therapy   $ Aerosol Therapy Charges PRN treatment not required   Respiratory Evaluation   $ Care Plan Tech Time 15 min

## 2023-10-25 NOTE — CONSULTS
"[facility]  Adult Nutrition   Consult Note (Nutrition Support Management)    SUMMARY     Recommendations  Recommendation/Intervention: 1. Glucerna 1.5 @ 40 mL/hr continuous would meet needs (1440 kcal. 79 gm protein, 729 mL FW). Suggest 30 mL every 4 hours free water flush. 2. Should bolus be desired: Glucerna 1.5, 240 mL 4 times/day with 60 mL free water flush ( 30 mL before and after each bolus). 3. Suggest additional free water 175 mL 3 times/day to meet needs.  Goals: 1. Patient to receive >/= 75% EEN / EPN. 2. Lab values trend to target range.  Nutrition Goal Status: new    Communication of RD Recs: reviewed with physician    Dietitian Rounds Brief  Consult for TF. Patient with history of CVA, dysphagia, and aphasia. Patient unable to tell me what feeding she was on prior to admit. Recommendations to MD and RN  per secure chat. Glucerna 1.5 @ 40 mL/hr or 4 x/day bolus will meet EEN / EPN for healing.  Wound care consult pending. Continue MVI and thiamine ordered.    Malnutrition Assessment   No visual s/s of malnutrition. NFPE not performed; family not present at visit.       Diet order:   Current Diet Order: NPO         Evaluation of Received Nutrient/Fluid Intake  Energy Calories Required: not meeting needs  Protein Required: not meeting needs  Fluid Required: not meeting needs  Tolerance: tolerating     % Intake of Estimated Energy Needs: 0%  % Meal Intake: NPO      Intake/Output Summary (Last 24 hours) at 10/25/2023 1006  Last data filed at 10/25/2023 0900  Gross per 24 hour   Intake 0 ml   Output 300 ml   Net -300 ml        Anthropometrics  Temp: 97.5 °F (36.4 °C)  Height Method: Estimated (from previous record)  Height: 4' 11" (149.9 cm)  Height (inches): 59 in  Weight Method: Bed Scale  Weight: 56.5 kg (124 lb 9 oz)  Weight (lb): 124.56 lb  Ideal Body Weight (IBW), Female: 95 lb  % Ideal Body Weight, Female (lb): 131.12 %  BMI (Calculated): 25.1  BMI Grade: 25 - 29.9 - overweight   "     Estimated/Assessed Needs  Weight Used For Calorie Calculations: 56.5 kg (124 lb 9 oz)  Energy Calorie Requirements (kcal): 3831-9007 kcal/day (25-30 kcal/kg)  Energy Need Method: Kcal/kg  Protein Requirements: 67-84/day (1.2-1.5 gm/kg)  Weight Used For Protein Calculations: 56.5 kg (124 lb 9 oz)     Estimated Fluid Requirement Method: RDA Method  RDA Method (mL): 1412       Reason for Assessment  Reason For Assessment: consult, new tube feeding  Relevant Medical History: CVA with associated aphasia and dysphagia, COPD, diabetes, sacral wound, hypertension  Interdisciplinary Rounds: did not attend    Nutrition/Diet History  Factors Affecting Nutritional Intake: NPO, difficulty/impaired swallowing  Nutrition Support Formula Prior to Admit: Other (see commments) (unknown)    Nutrition Risk Screen  Nutrition Risk Screen: tube feeding or parenteral nutrition       Altered Skin Integrity 10/24/23 2309 Right anterior Knee Partial thickness tissue loss. Shallow open ulcer with a red or pink wound bed, without slough. Intact or Open/Ruptured Serum-filled blister.-Wound Image: Images linked       Altered Skin Integrity 10/24/23 2310 Right anterior;lower Shin Partial thickness tissue loss. Shallow open ulcer with a red or pink wound bed, without slough. Intact or Open/Ruptured Serum-filled blister.-Wound Image: Images linked       Altered Skin Integrity 10/24/23 2311 medial Coccyx Ulceration Full thickness tissue loss. Subcutaneous fat may be visible but bone, tendon or muscle are not exposed-Wound Image: Images linked  MST Score: 2  Have you recently lost weight without trying?: Unsure  Weight loss score: 2  Have you been eating poorly because of a decreased appetite?: No  Appetite score: 0       Weight History:  Wt Readings from Last 5 Encounters:   10/24/23 56.5 kg (124 lb 9 oz)   08/12/23 55.8 kg (123 lb 0.3 oz)   08/11/23 58 kg (127 lb 13.9 oz)   07/03/23 59 kg (130 lb)   06/27/23 50.4 kg (111 lb)     "    Lab/Procedures/Meds: Pertinent Labs/Meds Reviewed    Medications:Pertinent Medications Reviewed  Scheduled Meds:   amLODIPine  10 mg Per G Tube Daily    apixaban  2.5 mg Per G Tube BID    aspirin  81 mg Per G Tube Daily    atorvastatin  80 mg Per G Tube QHS    cloNIDine  0.1 mg Per G Tube TID    EScitalopram oxalate  10 mg Per G Tube QHS    folic acid  1,000 mcg Per G Tube QAM    latanoprost  1 drop Both Eyes QHS    levetiracetam  500 mg Per G Tube BID    meropenem (MERREM) IVPB  1 g Intravenous Q8H    metoprolol tartrate  25 mg Per G Tube BID    minoxidiL  10 mg Per G Tube BID    multivitamin  1 tablet Per G Tube Daily    mupirocin   Nasal BID    polyethylene glycol  17 g Per G Tube Daily    thiamine  100 mg Per G Tube Daily    vancomycin (VANCOCIN) IV (PEDS and ADULTS)  750 mg Intravenous Q12H     Continuous Infusions:  PRN Meds:.dextrose 50%, dextrose 50%, glucagon (human recombinant), glucose, glucose, ipratropium, naloxone, potassium bicarbonate, potassium bicarbonate, potassium bicarbonate, sodium chloride 0.9%, Pharmacy to dose Vancomycin consult **AND** vancomycin - pharmacy to dose    Labs: Pertinent Labs Reviewed  Clinical Chemistry:  Recent Labs   Lab 10/24/23  1459 10/25/23  0522    135*   K 4.4 3.8    102   CO2 25 25   GLU 88 89   BUN 29* 20   CREATININE 0.6 0.6   CALCIUM 9.2 9.0   PROT 7.6 6.7   ALBUMIN 3.1* 2.7*   BILITOT 0.4 0.4   ALKPHOS 128 114   AST 18 17   ALT 17 14   ANIONGAP 10 8   MG  --  1.8   LIPASE 61* 63*     CBC:   Recent Labs   Lab 10/25/23  0522   WBC 16.96*   RBC 2.98*   HGB 7.6*   HCT 22.7*   *   MCV 76*   MCH 25.5*   MCHC 33.5     Lipid Panel:  No results for input(s): "CHOL", "HDL", "LDLCALC", "TRIG", "CHOLHDL" in the last 168 hours.  Cardiac Profile:  No results for input(s): "BNP", "CPK", "CPKMB", "TROPONINI", "CKTOTAL" in the last 168 hours.  Inflammatory Labs:  No results for input(s): "CRP" in the last 168 hours.  Diabetes:  No results for input(s): " ""HGBA1C", "POCTGLUCOSE" in the last 168 hours.  Thyroid & Parathyroid:  Recent Labs   Lab 10/24/23  1459   TSH 1.908       Monitor and Evaluation  Food and Nutrient Intake: energy intake, food and beverage intake  Food and Nutrient Adminstration: diet order  Knowledge/Beliefs/Attitudes: food and nutrition knowledge/skill  Physical Activity and Function: nutrition-related ADLs and IADLs  Biochemical Data, Medical Tests and Procedures: electrolyte and renal panel, gastrointestinal profile, glucose/endocrine profile, inflammatory profile, lipid profile  Nutrition-Focused Physical Findings: overall appearance     Nutrition Risk  Level of Risk/Frequency of Follow-up: high     Nutrition Follow-Up  RD Follow-up?: Yes      Jaelyn Mireles RD, LDN 10/25/2023 10:06 AM     "

## 2023-10-25 NOTE — PROGRESS NOTES
"Pharmacokinetic Initial Assessment: IV Vancomycin    Assessment/Plan:    Initiate intravenous vancomycin with loading dose of 1250  mg once followed by a maintenance dose of vancomycin 750 mg IV every 12 hours  Desired empiric serum trough concentration is 15 to 20 mcg/mL  Draw vancomycin trough level 30 min prior to fourth dose on 10/26 at approximately 04:00.  Pharmacy will continue to follow and monitor vancomycin.      Please contact pharmacy at extension [phone] with any questions regarding this assessment.     Thank you for the consult,   Cira Garcia       Patient brief summary:  Steff Johnson is a 63 y.o. female initiated on antimicrobial therapy with IV Vancomycin for treatment of suspected bacteremia    Drug Allergies:   Review of patient's allergies indicates:  No Known Allergies    Actual Body Weight:   54.4 kg    Renal Function:   Estimated Creatinine Clearance: 72 mL/min (based on SCr of 0.6 mg/dL).,     Dialysis Method (if applicable):  N/A    CBC (last 72 hours):  Recent Labs   Lab Result Units 10/24/23  1459   WBC K/uL 17.99*   Hemoglobin g/dL 8.8*   Hematocrit % 25.9*   Platelets K/uL 1,147*   Gran % % 77.4*   Lymph % % 12.5*   Mono % % 6.4   Eosinophil % % 0.7   Basophil % % 0.4   Differential Method  Automated       Metabolic Panel (last 72 hours):  Recent Labs   Lab Result Units 10/24/23  1430 10/24/23  1459   Sodium mmol/L  --  136   Potassium mmol/L  --  4.4   Chloride mmol/L  --  101   CO2 mmol/L  --  25   Glucose mg/dL  --  88   Glucose, UA  Negative  --    BUN mg/dL  --  29*   Creatinine mg/dL  --  0.6   Creatinine, Urine mg/dL 47.3  --    Albumin g/dL  --  3.1*   Total Bilirubin mg/dL  --  0.4   Alkaline Phosphatase U/L  --  128   AST U/L  --  18   ALT U/L  --  17       Drug levels (last 3 results):  No results for input(s): "VANCOMYCINRA", "VANCORANDOM", "VANCOMYCINPE", "VANCOPEAK", "VANCOMYCINTR", "VANCOTROUGH" in the last 72 hours.    Microbiologic Results:  Microbiology Results " (last 7 days)       Procedure Component Value Units Date/Time    Blood culture #2 **CANNOT BE ORDERED STAT** [[id number]] Collected: 10/24/23 1418    Order Status: Sent Specimen: Blood from Antecubital, Left Updated: 10/24/23 1450    Blood culture #1 **CANNOT BE ORDERED STAT** [[id number]] Collected: 10/24/23 1419    Order Status: Sent Specimen: Blood from Antecubital, Right Updated: 10/24/23 1449    Urine culture High Risk **CANNOT BE ORDERED STAT** [[id number]]     Order Status: No result Specimen: Urine, Catheterized

## 2023-10-25 NOTE — H&P
Formerly [facility], later [facility]    History & Physical      Patient Name: Steff Johnson  MRN: [id number]  Admission Date: 10/24/2023  Attending Physician: Luciano Sr MD   Primary Care Provider: Cristina Ren MD         Patient information was obtained from patient, past medical records, and ER records.     Subjective:     Principal Problem:Severe sepsis    Chief Complaint:   Chief Complaint   Patient presents with    Abnormal Labs        HPI: 63-year-old female with a history of a CVA in 2020 in the left pontine with associated aphasia and dysphagia, COPD, diabetes, sacral wound, hypertension who presents from [facility] with worsening labs and recent history of positive blood cultures?  Unable to clear history from the nursing home however they mentioned that there is a possible pelvic fluid collection that she need to be seen for?  Patient is able to say her name but is unable to maintain further conversation due to her aphasia.  Patient was noncommunicative at baseline.  The ER nurse taking care versus this is her baseline according to his experienced.    As part for outpatient workup she had [facility]  Patient is transferred with paperwork including cultures drawn on October 19th which grew Proteus, Klebsiella, Enterococcus.  The source of the cultures as not specified however getting this information has been unsuccessful thus far.       Past Medical History:   Diagnosis Date    Arthritis     Complete tear of left rotator cuff 11/09/2017    COPD (chronic obstructive pulmonary disease)     COVID-19 infection 07/02/2021    Diabetes mellitus     H/o Cerebrovascular accident (CVA) of left pontine structure 12/12/2019    Hypertension     Personal history of colonic polyps     Stroke     Wears glasses        Past Surgical History:   Procedure Laterality Date    COLONOSCOPY N/A 4/11/2017    Procedure: COLONOSCOPY;  Surgeon: Jared Antonio MD;   Location: [facility];  Service: Endoscopy;  Laterality: N/A;    HYSTERECTOMY      OOPHORECTOMY      SHOULDER SURGERY      R    TRANSESOPHAGEAL ECHOCARDIOGRAM WITH POSSIBLE CARDIOVERSION (GT W/ POSS CARDIOVERSION) N/A 5/4/2023    Procedure: Transesophageal echo (GT) intra-procedure log documentation;  Surgeon: Blade Phillip MD;  Location: [facility] CATH[facility]EP LAB;  Service: Cardiology;  Laterality: N/A;       Review of patient's allergies indicates:  No Known Allergies    No current facility-administered medications on file prior to encounter.     Current Outpatient Medications on File Prior to Encounter   Medication Sig    amLODIPine (NORVASC) 10 MG tablet Take 1 tablet (10 mg total) by mouth once daily.    apixaban (ELIQUIS) 2.5 mg Tab 2.5 mg by Per G Tube route 2 (two) times daily.    aspirin 81 MG Chew Take 1 tablet (81 mg total) by mouth once daily.    atorvastatin (LIPITOR) 80 MG tablet Take 1 tablet (80 mg total) by mouth every evening. (Patient taking differently: 80 mg by Per G Tube route every evening.)    baclofen (LIORESAL) 5 mg Tab tablet 5 mg by Per G Tube route every 8 (eight) hours as needed (muscle spasms).    folic acid (FOLVITE) 1 MG tablet Take 1 tablet by mouth every morning.    insulin (LANTUS SOLOSTAR U-100 INSULIN) glargine 100 units/mL SubQ pen Inject 25 Units into the skin once daily.    insulin aspart U-100 (NOVOLOG) 100 unit/mL injection Inject 2 Units into the skin 3 (three) times daily before meals.    latanoprost 0.005 % ophthalmic solution Place 1 drop into both eyes once daily. (Patient taking differently: Place 1 drop into both eyes every evening.)    levETIRAcetam (KEPPRA) 100 mg/mL Soln 500 mg by Per G Tube route 2 (two) times daily.    levoFLOXacin (LEVAQUIN) 750 MG tablet 750 mg by Per G Tube route once daily.    losartan (COZAAR) 100 MG tablet Take 1 tablet (100 mg total) by mouth once daily. (Patient taking differently: 100 mg by Per G Tube route once daily.)    magnesium oxide  (MAG-OX) 400 mg (241.3 mg magnesium) tablet Take 400 mg by mouth once daily.    metFORMIN (GLUCOPHAGE) 500 MG tablet 1,000 mg by Per G Tube route 2 (two) times daily with meals.    metoprolol tartrate (LOPRESSOR) 25 MG tablet 25 mg by Per G Tube route 2 (two) times daily.    multivitamin (THERAGRAN) per tablet 1 tablet by Per G Tube route once daily.    polyethylene glycol (GLYCOLAX) 17 gram PwPk 17 g by Per G Tube route once daily.    thiamine (VITAMIN B-1) 100 MG tablet 100 mg by Per G Tube route once daily.    cloNIDine (CATAPRES) 0.1 MG tablet Take 1 tablet (0.1 mg total) by mouth 3 (three) times daily.    EScitalopram oxalate (LEXAPRO) 10 MG tablet Take 10 mg by mouth every evening.    insulin lispro 100 unit/mL injection Inject 5 Units into the skin 3 (three) times daily.    metoprolol succinate (TOPROL-XL) 50 MG 24 hr tablet Take 1 tablet (50 mg total) by mouth once daily.    minoxidiL (LONITEN) 10 MG Tab Take 10 mg by mouth 2 (two) times daily.    tiotropium (SPIRIVA) 18 mcg inhalation capsule Inhale 1 capsule (18 mcg total) into the lungs once daily. Controller    [DISCONTINUED] blood-glucose meter (TRUE METRIX GLUCOSE METER) kit Use as instructed    [DISCONTINUED] lancing device (TRUEDRAW LANCING DEVICE) Misc Inject 1 Device into the skin 2 (two) times daily.     Family History       Problem Relation (Age of Onset)    Anemia Daughter    Asthma Mother    Diabetes Mother, Father, Brother    Hypertension Mother, Brother          Tobacco Use    Smoking status: Never    Smokeless tobacco: Never   Substance and Sexual Activity    Alcohol use: No    Drug use: No    Sexual activity: Not Currently     Review of Systems  Objective:     Vital Signs (Most Recent):  Temp: 99 °F (37.2 °C) (10/24/23 2328)  Pulse: 101 (10/24/23 2328)  Resp: 18 (10/24/23 2328)  BP: (!) 166/94 (real RN notified) (10/24/23 2328)  SpO2: 96 % (10/24/23 2328) Vital Signs (24h Range):  Temp:  [97 °F (36.1 °C)-99 °F (37.2 °C)] 99 °F (37.2  °C)  Pulse:  [] 101  Resp:  [18-24] 18  SpO2:  [95 %-100 %] 96 %  BP: (152-178)/(65-94) 166/94     Weight: 56.5 kg (124 lb 9 oz)  Body mass index is 25.16 kg/m².    Physical Exam   Physical Exam     Nursing note and vitals reviewed.  Constitutional: She appears well-developed and well-nourished.   Elderly female lying in bed, breathing appropriately, minimal speech due to aphasia, able to look around the room.  Chronic Mathur catheter in place.   HENT:   Head: Normocephalic and atraumatic.   Eyes: EOM are normal. Pupils are equal, round, and reactive to light.   Neck:   Normal range of motion.  Cardiovascular:  Normal rate and regular rhythm.     Exam reveals no friction rub.       No murmur heard.  Pulmonary/Chest: Breath sounds normal. No respiratory distress. She has no wheezes. She has no rales.   Abdominal: Abdomen is soft. Bowel sounds are normal. She exhibits no distension. There is abdominal tenderness.  PEG tube  Mild tenderness and guarding noted for lower abdomen There is guarding.  Mathur replaced last week  Musculoskeletal:         General: Normal range of motion.      Cervical back: Normal range of motion.      Neurological: She is alert.   Per nursing home, this is away from her baseline.   Skin: Skin is warm and dry.                   Significant Labs: All pertinent labs within the past 24 hours have been reviewed.    Significant Imaging: I have reviewed all pertinent imaging results/findings within the past 24 hours.    Assessment/Plan:     Active Diagnoses:    Diagnosis Date Noted POA    PRINCIPAL PROBLEM:  Severe sepsis [A41.9, R65.20] 10/25/2023 Unknown      Problems Resolved During this Admission:     VTE Risk Mitigation (From admission, onward)           Ordered     apixaban tablet 2.5 mg  2 times daily         10/24/23 2131     IP VTE HIGH RISK PATIENT  Once         10/24/23 2053     Place sequential compression device  Until discontinued         10/24/23 2053                   #Sepsis  #Reactive thrombocytosis  # sacral wound-POA  #PMHx: expressive aphasia, right upper extremity paralysis, HTN, DM, COPD    Start vancomycin, meropenem based on sensitivities from outside cultures (placed in physical chart)  Check Blood cultures  Infectious Disease, Hematology consult  Pan CT head chest abdomen pelvis unrevealing  We will get pathologist review the smear  Infectious Disease, Hematology consult  Wound care consult, nutrition consult for PEG tube feedings  Consider tagged white blood cell scan  PTA Eliquis will serve as VTE prophylaxis        Luciano Sr MD  Department of Hospital Medicine   [facility]

## 2023-10-25 NOTE — ASSESSMENT & PLAN NOTE
Sepsis, secondary to polymicrobial bacteremia;   Enterococcus spp, Proteus, and Klebsiella   Maintain iv abx  ID on Board  Follow up MRI Sacrum results

## 2023-10-25 NOTE — PLAN OF CARE
Pt AAOx 1. Nonverbal. Slept well this shift. No signs, symptoms, or complaints of distress at this time. Care ongoing.     Problem: Infection  Goal: Absence of Infection Signs and Symptoms  Outcome: Ongoing, Progressing     Problem: Adult Inpatient Plan of Care  Goal: Plan of Care Review  Outcome: Ongoing, Progressing  Goal: Patient-Specific Goal (Individualized)  Outcome: Ongoing, Progressing  Goal: Absence of Hospital-Acquired Illness or Injury  Outcome: Ongoing, Progressing  Goal: Optimal Comfort and Wellbeing  Outcome: Ongoing, Progressing  Goal: Readiness for Transition of Care  Outcome: Ongoing, Progressing     Problem: Impaired Wound Healing  Goal: Optimal Wound Healing  Outcome: Ongoing, Progressing     Problem: Skin Injury Risk Increased  Goal: Skin Health and Integrity  Outcome: Ongoing, Progressing     Problem: Fall Injury Risk  Goal: Absence of Fall and Fall-Related Injury  Outcome: Ongoing, Progressing     Problem: Adjustment to Illness (Sepsis/Septic Shock)  Goal: Optimal Coping  Outcome: Ongoing, Progressing     Problem: Bleeding (Sepsis/Septic Shock)  Goal: Absence of Bleeding  Outcome: Ongoing, Progressing     Problem: Glycemic Control Impaired (Sepsis/Septic Shock)  Goal: Blood Glucose Level Within Desired Range  Outcome: Ongoing, Progressing     Problem: Infection Progression (Sepsis/Septic Shock)  Goal: Absence of Infection Signs and Symptoms  Outcome: Ongoing, Progressing     Problem: Nutrition Impaired (Sepsis/Septic Shock)  Goal: Optimal Nutrition Intake  Outcome: Ongoing, Progressing

## 2023-10-25 NOTE — PROGRESS NOTES
Automatic Inhaler to Nebulizer Interchange    Tiotropium (Spiriva Respimat) 5 mcg changed to Ipratropium 0.5 mg every 6 hours per [facility] Automatic Therapeutic Substitutions Protocol.    Please contact pharmacy at extension [phone] with any questions.     Thank you,   Anna Pimentel

## 2023-10-26 LAB
ABO + RH BLD: NORMAL
ALBUMIN SERPL BCP-MCNC: 2.5 G/DL (ref 3.5–5.2)
ALP SERPL-CCNC: 104 U/L (ref 55–135)
ALT SERPL W/O P-5'-P-CCNC: 12 U/L (ref 10–44)
ANION GAP SERPL CALC-SCNC: 8 MMOL/L (ref 8–16)
AST SERPL-CCNC: 13 U/L (ref 10–40)
BASOPHILS # BLD AUTO: 0.05 K/UL (ref 0–0.2)
BASOPHILS NFR BLD: 0.4 % (ref 0–1.9)
BILIRUB SERPL-MCNC: 0.2 MG/DL (ref 0.1–1)
BLD GP AB SCN CELLS X3 SERPL QL: NORMAL
BLD PROD TYP BPU: NORMAL
BLOOD UNIT EXPIRATION DATE: NORMAL
BLOOD UNIT TYPE CODE: 9500
BLOOD UNIT TYPE: NORMAL
BUN SERPL-MCNC: 25 MG/DL (ref 8–23)
CALCIUM SERPL-MCNC: 8.3 MG/DL (ref 8.7–10.5)
CHLORIDE SERPL-SCNC: 101 MMOL/L (ref 95–110)
CO2 SERPL-SCNC: 24 MMOL/L (ref 23–29)
CODING SYSTEM: NORMAL
CREAT SERPL-MCNC: 0.6 MG/DL (ref 0.5–1.4)
CROSSMATCH INTERPRETATION: NORMAL
DIFFERENTIAL METHOD BLD: ABNORMAL
DISPENSE STATUS: NORMAL
EOSINOPHIL # BLD AUTO: 0.1 K/UL (ref 0–0.5)
EOSINOPHIL NFR BLD: 0.6 % (ref 0–8)
ERYTHROCYTE [DISTWIDTH] IN BLOOD BY AUTOMATED COUNT: 17.5 % (ref 11.5–14.5)
EST. GFR  (NO RACE VARIABLE): >60 ML/MIN/1.73 M^2
FERRITIN SERPL-MCNC: 746.6 NG/ML (ref 20–300)
FOLATE SERPL-MCNC: 20.3 NG/ML (ref 4–24)
GLUCOSE SERPL-MCNC: 241 MG/DL (ref 70–110)
GLUCOSE SERPL-MCNC: 256 MG/DL (ref 70–110)
GLUCOSE SERPL-MCNC: 301 MG/DL (ref 70–110)
HCT VFR BLD AUTO: 19.6 % (ref 37–48.5)
HGB BLD-MCNC: 6.8 G/DL (ref 12–16)
IMM GRANULOCYTES # BLD AUTO: 0.3 K/UL (ref 0–0.04)
IMM GRANULOCYTES NFR BLD AUTO: 2.1 % (ref 0–0.5)
IRON SERPL-MCNC: 61 UG/DL (ref 30–160)
LYMPHOCYTES # BLD AUTO: 1.5 K/UL (ref 1–4.8)
LYMPHOCYTES NFR BLD: 10.5 % (ref 18–48)
MAGNESIUM SERPL-MCNC: 1.9 MG/DL (ref 1.6–2.6)
MCH RBC QN AUTO: 26.1 PG (ref 27–31)
MCHC RBC AUTO-ENTMCNC: 34.7 G/DL (ref 32–36)
MCV RBC AUTO: 75 FL (ref 82–98)
MONOCYTES # BLD AUTO: 1.2 K/UL (ref 0.3–1)
MONOCYTES NFR BLD: 8.1 % (ref 4–15)
NEUTROPHILS # BLD AUTO: 11.1 K/UL (ref 1.8–7.7)
NEUTROPHILS NFR BLD: 78.3 % (ref 38–73)
NRBC BLD-RTO: 0 /100 WBC
NUM UNITS TRANS PACKED RBC: NORMAL
PLATELET # BLD AUTO: 904 K/UL (ref 150–450)
PMV BLD AUTO: 9.1 FL (ref 9.2–12.9)
POTASSIUM SERPL-SCNC: 4.3 MMOL/L (ref 3.5–5.1)
PROT SERPL-MCNC: 5.9 G/DL (ref 6–8.4)
RBC # BLD AUTO: 2.61 M/UL (ref 4–5.4)
RETICS/RBC NFR AUTO: 2 % (ref 0.5–2.5)
SATURATED IRON: 36 % (ref 20–50)
SODIUM SERPL-SCNC: 133 MMOL/L (ref 136–145)
SPECIMEN OUTDATE: NORMAL
TOTAL IRON BINDING CAPACITY: 168 UG/DL (ref 250–450)
TRANSFERRIN SERPL-MCNC: 120 MG/DL (ref 200–375)
VANCOMYCIN TROUGH SERPL-MCNC: 18.3 UG/ML
VIT B12 SERPL-MCNC: 449 PG/ML (ref 210–950)
WBC # BLD AUTO: 14.13 K/UL (ref 3.9–12.7)

## 2023-10-26 PROCEDURE — 25000003 PHARM REV CODE 250: Performed by: STUDENT IN AN ORGANIZED HEALTH CARE EDUCATION/TRAINING PROGRAM

## 2023-10-26 PROCEDURE — 12000002 HC ACUTE/MED SURGE SEMI-PRIVATE ROOM

## 2023-10-26 PROCEDURE — 86850 RBC ANTIBODY SCREEN: CPT | Performed by: INTERNAL MEDICINE

## 2023-10-26 PROCEDURE — 83735 ASSAY OF MAGNESIUM: CPT | Performed by: INTERNAL MEDICINE

## 2023-10-26 PROCEDURE — 83540 ASSAY OF IRON: CPT | Performed by: INTERNAL MEDICINE

## 2023-10-26 PROCEDURE — 80202 ASSAY OF VANCOMYCIN: CPT | Performed by: INTERNAL MEDICINE

## 2023-10-26 PROCEDURE — 99233 SBSQ HOSP IP/OBS HIGH 50: CPT | Mod: ,,, | Performed by: INTERNAL MEDICINE

## 2023-10-26 PROCEDURE — 86920 COMPATIBILITY TEST SPIN: CPT | Performed by: INTERNAL MEDICINE

## 2023-10-26 PROCEDURE — 80053 COMPREHEN METABOLIC PANEL: CPT | Performed by: INTERNAL MEDICINE

## 2023-10-26 PROCEDURE — 63600175 PHARM REV CODE 636 W HCPCS: Performed by: INTERNAL MEDICINE

## 2023-10-26 PROCEDURE — 82746 ASSAY OF FOLIC ACID SERUM: CPT | Performed by: INTERNAL MEDICINE

## 2023-10-26 PROCEDURE — 25000003 PHARM REV CODE 250: Performed by: INTERNAL MEDICINE

## 2023-10-26 PROCEDURE — P9040 RBC LEUKOREDUCED IRRADIATED: HCPCS | Performed by: INTERNAL MEDICINE

## 2023-10-26 PROCEDURE — 99900035 HC TECH TIME PER 15 MIN (STAT)

## 2023-10-26 PROCEDURE — 82607 VITAMIN B-12: CPT | Performed by: INTERNAL MEDICINE

## 2023-10-26 PROCEDURE — 85025 COMPLETE CBC W/AUTO DIFF WBC: CPT | Performed by: INTERNAL MEDICINE

## 2023-10-26 PROCEDURE — 36430 TRANSFUSION BLD/BLD COMPNT: CPT

## 2023-10-26 PROCEDURE — 94761 N-INVAS EAR/PLS OXIMETRY MLT: CPT

## 2023-10-26 PROCEDURE — 63600175 PHARM REV CODE 636 W HCPCS: Performed by: STUDENT IN AN ORGANIZED HEALTH CARE EDUCATION/TRAINING PROGRAM

## 2023-10-26 PROCEDURE — 85045 AUTOMATED RETICULOCYTE COUNT: CPT | Performed by: INTERNAL MEDICINE

## 2023-10-26 PROCEDURE — 36415 COLL VENOUS BLD VENIPUNCTURE: CPT | Performed by: INTERNAL MEDICINE

## 2023-10-26 PROCEDURE — 82668 ASSAY OF ERYTHROPOIETIN: CPT | Performed by: INTERNAL MEDICINE

## 2023-10-26 PROCEDURE — 82728 ASSAY OF FERRITIN: CPT | Performed by: INTERNAL MEDICINE

## 2023-10-26 RX ORDER — HYDROCODONE BITARTRATE AND ACETAMINOPHEN 500; 5 MG/1; MG/1
TABLET ORAL
Status: DISCONTINUED | OUTPATIENT
Start: 2023-10-26 | End: 2023-10-27 | Stop reason: HOSPADM

## 2023-10-26 RX ADMIN — LATANOPROST 1 DROP: 50 SOLUTION OPHTHALMIC at 08:10

## 2023-10-26 RX ADMIN — ASPIRIN 81 MG 81 MG: 81 TABLET ORAL at 10:10

## 2023-10-26 RX ADMIN — ESCITALOPRAM OXALATE 10 MG: 10 TABLET ORAL at 08:10

## 2023-10-26 RX ADMIN — THERA TABS 1 TABLET: TAB at 10:10

## 2023-10-26 RX ADMIN — METOPROLOL TARTRATE 25 MG: 25 TABLET, FILM COATED ORAL at 10:10

## 2023-10-26 RX ADMIN — VANCOMYCIN HYDROCHLORIDE 750 MG: 750 INJECTION, POWDER, LYOPHILIZED, FOR SOLUTION INTRAVENOUS at 06:10

## 2023-10-26 RX ADMIN — FOLIC ACID 1000 MCG: 1 TABLET ORAL at 06:10

## 2023-10-26 RX ADMIN — MUPIROCIN 1 G: 20 OINTMENT TOPICAL at 08:10

## 2023-10-26 RX ADMIN — APIXABAN 2.5 MG: 2.5 TABLET, FILM COATED ORAL at 10:10

## 2023-10-26 RX ADMIN — ATORVASTATIN CALCIUM 80 MG: 40 TABLET, FILM COATED ORAL at 08:10

## 2023-10-26 RX ADMIN — APIXABAN 2.5 MG: 2.5 TABLET, FILM COATED ORAL at 08:10

## 2023-10-26 RX ADMIN — CLONIDINE HYDROCHLORIDE 0.1 MG: 0.1 TABLET ORAL at 10:10

## 2023-10-26 RX ADMIN — LEVETIRACETAM 500 MG: 100 SOLUTION ORAL at 10:10

## 2023-10-26 RX ADMIN — MUPIROCIN 1 G: 20 OINTMENT TOPICAL at 09:10

## 2023-10-26 RX ADMIN — THIAMINE HCL TAB 100 MG 100 MG: 100 TAB at 10:10

## 2023-10-26 RX ADMIN — INSULIN ASPART 6 UNITS: 100 INJECTION, SOLUTION INTRAVENOUS; SUBCUTANEOUS at 06:10

## 2023-10-26 RX ADMIN — CLONIDINE HYDROCHLORIDE 0.1 MG: 0.1 TABLET ORAL at 08:10

## 2023-10-26 RX ADMIN — INSULIN ASPART 4 UNITS: 100 INJECTION, SOLUTION INTRAVENOUS; SUBCUTANEOUS at 09:10

## 2023-10-26 RX ADMIN — CLONIDINE HYDROCHLORIDE 0.1 MG: 0.1 TABLET ORAL at 03:10

## 2023-10-26 RX ADMIN — LEVETIRACETAM 500 MG: 100 SOLUTION ORAL at 08:10

## 2023-10-26 RX ADMIN — AMLODIPINE BESYLATE 10 MG: 5 TABLET ORAL at 10:10

## 2023-10-26 RX ADMIN — CEFTRIAXONE SODIUM 2 G: 2 INJECTION, POWDER, FOR SOLUTION INTRAMUSCULAR; INTRAVENOUS at 03:10

## 2023-10-26 RX ADMIN — METOPROLOL TARTRATE 25 MG: 25 TABLET, FILM COATED ORAL at 08:10

## 2023-10-26 NOTE — CONSULTS
Hematology/Oncology  Inpatient Consult Note  10/25/23  Patient Name: Steff Johnson  MRN: [id number]  Admission Date: 10/24/2023  Hospital Length of Stay: 1 days  Code Status: Full Code   Attending Provider: Roger Berry MD  Referring Provider: Dr. Sr  Consulting Provider: PARUL Crawley MD  Primary Care Physician: Cristina Ren MD  Principal Problem:Sepsis    Inpatient consult to Hematology/Oncology  Consult performed by: PARUL Crawley MD  Consult ordered by: Luciano Sr MD        Subjective:     Chief Complaint: Abnormal Labs        History Present Illness:  63 y.o. female with positive blood C/S,  Now on antibiotics.  Hx CVA, COPD, DM, HTN, sacral decubitus.    Noted moderate microcytic anemia, moderate leukocytosis and thrombocytosis.      Past Medical/Surgical History:  Past Medical History:   Diagnosis Date    Arthritis     Complete tear of left rotator cuff 11/09/2017    COPD (chronic obstructive pulmonary disease)     COVID-19 infection 07/02/2021    Diabetes mellitus     H/o Cerebrovascular accident (CVA) of left pontine structure 12/12/2019    Hypertension     Personal history of colonic polyps     Stroke     Wears glasses      Past Surgical History:   Procedure Laterality Date    COLONOSCOPY N/A 4/11/2017    Procedure: COLONOSCOPY;  Surgeon: Jared Antonio MD;  Location: [facility];  Service: Endoscopy;  Laterality: N/A;    HYSTERECTOMY      OOPHORECTOMY      SHOULDER SURGERY      R    TRANSESOPHAGEAL ECHOCARDIOGRAM WITH POSSIBLE CARDIOVERSION (GT W/ POSS CARDIOVERSION) N/A 5/4/2023    Procedure: Transesophageal echo (GT) intra-procedure log documentation;  Surgeon: Blade Phillip MD;  Location: [facility] CATH/EP LAB;  Service: Cardiology;  Laterality: N/A;       Allergies:  Review of patient's allergies indicates:  No Known Allergies    Social/Family History:  Social History     Socioeconomic History    Marital status: Single   Occupational History     Comment: disabled due to  shoulder problems   Tobacco Use    Smoking status: Never    Smokeless tobacco: Never   Substance and Sexual Activity    Alcohol use: No    Drug use: No    Sexual activity: Not Currently     Social Determinants of Health     Financial Resource Strain: Low Risk  (4/27/2023)    Overall Financial Resource Strain (CARDIA)     Difficulty of Paying Living Expenses: Not very hard   Food Insecurity: No Food Insecurity (4/27/2023)    Hunger Vital Sign     Worried About Running Out of Food in the Last Year: Never true     Ran Out of Food in the Last Year: Never true   Transportation Needs: No Transportation Needs (4/27/2023)    PRAPARE - Transportation     Lack of Transportation (Medical): No     Lack of Transportation (Non-Medical): No   Physical Activity: Inactive (4/27/2023)    Exercise Vital Sign     Days of Exercise per Week: 0 days     Minutes of Exercise per Session: 0 min   Stress: No Stress Concern Present (4/27/2023)    [employer] [place] [employer] - Occupational Stress Questionnaire     Feeling of Stress : Not at all   Social Connections: Moderately Integrated (4/27/2023)    Social Connection and Isolation Panel [NHANES]     Frequency of Communication with Friends and Family: More than three times a week     Frequency of Social Gatherings with Friends and Family: More than three times a week     Attends Jain Services: More than 4 times per year     Active Member of Clubs or Organizations: No     Attends Club or Organization Meetings: Never     Marital Status: Living with partner   Housing Stability: Low Risk  (4/27/2023)    Housing Stability Vital Sign     Unable to Pay for Housing in the Last Year: No     Number of Places Lived in the Last Year: 1     Unstable Housing in the Last Year: No     Family History   Problem Relation Age of Onset    Diabetes Mother     Asthma Mother     Hypertension Mother     Diabetes Father     Diabetes Brother     Hypertension Brother     Anemia Daughter     Glaucoma  "Neg Hx     Macular degeneration Neg Hx     Retinal detachment Neg Hx        ROS:    GEN: normal without any fever, night sweats or weight loss  HEENT: See HPI  CV: normal with no CP, SOB, PND, SEGUNDO or orthopnea  PULM: See HP{I  GI: normal with no abdominal pain, nausea, vomiting, constipation, diarrhea, melanotic stools, BRBPR, or hematemesis  : normal with no hematuria, dysuria  BREAST: normal with no mass, discharge, pain  SKIN: See HPI        Medications:  Continuous Infusions:  Scheduled Meds:   amLODIPine  10 mg Per G Tube Daily    apixaban  2.5 mg Per G Tube BID    aspirin  81 mg Per G Tube Daily    atorvastatin  80 mg Per G Tube QHS    cefTRIAXone (ROCEPHIN) IVPB  2 g Intravenous Q24H    cloNIDine  0.1 mg Per G Tube TID    EScitalopram oxalate  10 mg Per G Tube QHS    folic acid  1,000 mcg Per G Tube QAM    latanoprost  1 drop Both Eyes QHS    levetiracetam  500 mg Per G Tube BID    metoprolol tartrate  25 mg Per G Tube BID    multivitamin  1 tablet Per G Tube Daily    mupirocin   Nasal BID    polyethylene glycol  17 g Per G Tube Daily    thiamine  100 mg Per G Tube Daily    vancomycin (VANCOCIN) IV (PEDS and ADULTS)  750 mg Intravenous Q12H     PRN Meds:dextrose 50%, dextrose 50%, glucagon (human recombinant), glucose, glucose, insulin aspart U-100, ipratropium, naloxone, potassium bicarbonate, potassium bicarbonate, potassium bicarbonate, sodium chloride 0.9%, Pharmacy to dose Vancomycin consult **AND** vancomycin - pharmacy to dose     Objective:     Vitals:  Blood pressure 122/60, pulse 95, temperature 98 °F (36.7 °C), temperature source Axillary, resp. rate 14, height 4' 11" (1.499 m), weight 56.5 kg (124 lb 9 oz), SpO2 99 %, not currently breastfeeding.    Physical Exam:  GEN: no apparent distress, comfortable  HEAD: atraumatic and normocephalic  EYES: no pallor, no icterus  ENT: external ears WNL; no nasal discharge  NECK: no masses, thyroid normal, trachea midline, no LAD/LN's, supple  CV: RRR with " no murmur; normal pulse; normal S1 and S2  CHEST: Normal respiratory effort; CTAB; normal breath sounds; no wheeze or crackles  ABDOM: nontender and nondistended; soft; no rebound/guarding, L/S NP  MUSC/Skeletal: no deformities or arthropathy  EXTREM: no clubbing, cyanosis, inflammation   SKIN: no rashes, lesions, ulcers, petechiae or subcutaneous nodules  : no del rosario  NEURO: Non verbal, she appears to follow my conversation  LYMPH: normal cervical, supraclavicular,  LN's    Hgb 7.6, WBC 17,000, plt cnt  874,000.  LFT's NL, creat. 0.6,     CXR, CT, MRI, U/S reports reviewed.    Assessment/Plan:     (1) 63 y.o. female with positive blood c/s,  Suspected sepsis, decubitus of skin.    (2)Moderate anemia, diff dx includes Fe deficiency, nutritional deficiency, renal insufficiency, chronic disease,   And hemoglobinopathy.    (3)Leukocytosis, diff dx includes 2nd to vitamin or Fe deficiency, leukemoid reaction 2nd sepsis, positive blood C/S.    (4)Thrombocytosis, 2nd vitamin or Fe deficiency, and sepsis with positive blood c/s.    (5)Myeloproliferative Syndrome is in differential.     Plan  Supplement with Fe infusion and vitamin supplement if deficient    Retic and erythropoitin,  EPO trial if deficient?    Await lab.  Follow up 10/26.      Active Diagnoses:    Diagnosis Date Noted POA    PRINCIPAL PROBLEM:  Sepsis [A41.9] 10/25/2023 Unknown    Thrombocytosis [D75.839] 10/25/2023 Unknown    Cognitive communication deficit [R41.841] 05/31/2023 Yes    Aphasia [R47.01] 05/31/2023 Yes    CVA (cerebral vascular accident) [I63.9] 12/12/2019 Yes    Type 2 diabetes mellitus with both eyes affected by moderate nonproliferative retinopathy and macular edema, with long-term current use of insulin [E11.3313, Z79.4] 08/07/2019 Not Applicable    Hypertension associated with diabetes [E11.59, I15.2] 03/31/2012 Yes      Problems Resolved During this Admission:          PARUL Crawley MD  Hematology/Oncology  [facility]    10/25/23

## 2023-10-26 NOTE — PROGRESS NOTES
Pharmacokinetic Assessment Follow Up: IV Vancomycin    Vancomycin serum concentration assessment(s):    The trough level was drawn correctly and can be used to guide therapy at this time. The measurement is within the desired definitive target range of 15 to 20 mcg/mL.    Vancomycin Regimen Plan:    Continue regimen to Vancomycin 750 mg IV every 12 hours with next serum trough concentration measured at 1700 prior to 1800 dose on 10/27/2023.    Drug levels (last 3 results):  Recent Labs   Lab Result Units 10/26/23  0519   Vancomycin-Trough ug/mL 18.3       Pharmacy will continue to follow and monitor vancomycin.    Please contact pharmacy at extension [phone] for questions regarding this assessment.    Thank you for the consult,   Benja Villegas       Patient brief summary:  Steff Johnson is a 63 y.o. female initiated on antimicrobial therapy with IV Vancomycin for treatment of bacteremia    The patient's current regimen is Vancomycin 750 mg IVPB every 12 hours.    Drug Allergies:   Review of patient's allergies indicates:  No Known Allergies    Actual Body Weight:   56.5 kg    Renal Function:   Estimated Creatinine Clearance: 72 mL/min (based on SCr of 0.6 mg/dL).,     Dialysis Method (if applicable):  N/A    CBC (last 72 hours):  Recent Labs   Lab Result Units 10/24/23  1459 10/25/23  0522 10/26/23  0519   WBC K/uL 17.99* 16.96* 14.13*   Hemoglobin g/dL 8.8* 7.6* 6.8*   Hematocrit % 25.9* 22.7* 19.6*   Platelets K/uL 1,147* 874* 904*   Gran % % 77.4* 80.0* 78.3*   Lymph % % 12.5* 10.0* 10.5*   Mono % % 6.4 6.2 8.1   Eosinophil % % 0.7 0.6 0.6   Basophil % % 0.4 0.5 0.4   Differential Method  Automated Automated Automated       Metabolic Panel (last 72 hours):  Recent Labs   Lab Result Units 10/24/23  1430 10/24/23  1459 10/25/23  0522 10/26/23  0519   Sodium mmol/L  --  136 135* 133*   Potassium mmol/L  --  4.4 3.8 4.3   Chloride mmol/L  --  101 102 101   CO2 mmol/L  --  25 25 24   Glucose mg/dL  --  88 89 241*    Glucose, UA  Negative  --   --   --    BUN mg/dL  --  29* 20 25*   Creatinine mg/dL  --  0.6 0.6 0.6   Creatinine, Urine mg/dL 47.3  --   --   --    Albumin g/dL  --  3.1* 2.7* 2.5*   Total Bilirubin mg/dL  --  0.4 0.4 0.2   Alkaline Phosphatase U/L  --  128 114 104   AST U/L  --  18 17 13   ALT U/L  --  17 14 12   Magnesium mg/dL  --   --  1.8 1.9       Vancomycin Administrations:  vancomycin given in the last 96 hours                     vancomycin 750 mg in dextrose 5 % 250 mL IVPB (ready to mix) (mg) 750 mg New Bag 10/26/23 0615     750 mg New Bag 10/25/23 1645     750 mg New Bag  0453    vancomycin 1.25 g in dextrose 5% 250 mL IVPB (ready to mix) (mg) 1,250 mg New Bag 10/24/23 1632                    Microbiologic Results:  Microbiology Results (last 7 days)       Procedure Component Value Units Date/Time    Blood culture #1 **CANNOT BE ORDERED STAT** [[id number]] Collected: 10/24/23 1419    Order Status: Completed Specimen: Blood from Antecubital, Right Updated: 10/25/23 1632     Blood Culture, Routine No Growth to date      No Growth to date    Blood culture #2 **CANNOT BE ORDERED STAT** [[id number]] Collected: 10/24/23 1418    Order Status: Completed Specimen: Blood from Antecubital, Left Updated: 10/25/23 1632     Blood Culture, Routine No Growth to date      No Growth to date    Urine culture [[id number]] Collected: 10/24/23 1430    Order Status: No result Specimen: Urine from Clean Catch Updated: 10/25/23 0922    Urine Culture High Risk [[id number]]     Order Status: Completed Specimen: Urine, Catheterized     Urine culture High Risk **CANNOT BE ORDERED STAT** [[id number]]     Order Status: No result Specimen: Urine, Catheterized

## 2023-10-26 NOTE — CARE UPDATE
10/26/23 0758   Patient Assessment/Suction   Level of Consciousness (AVPU) alert   Respiratory Effort Normal;Unlabored   Expansion/Accessory Muscles/Retractions no use of accessory muscles;no retractions;expansion symmetric   All Lung Fields Breath Sounds clear   Rhythm/Pattern, Respiratory unlabored;pattern regular;depth regular;chest wiggle adequate;no shortness of breath reported   Cough Frequency no cough   PRE-TX-O2   Device (Oxygen Therapy) room air   SpO2 96 %   Pulse Oximetry Type Intermittent   $ Pulse Oximetry - Multiple Charge Pulse Oximetry - Multiple   Pulse 79   Resp 18   Aerosol Therapy   $ Aerosol Therapy Charges PRN treatment not required   Respiratory Evaluation   $ Care Plan Tech Time 15 min

## 2023-10-26 NOTE — CONSULTS
Chief complaint:  Abnormal Labs      HPI:  Steff Johnson is a 63 y.o. female presenting with an open wound of the right LE and an unstagaable pressure ulcer of the sacrum. Both wounds are POA. Pt is non verbal and cannot give a history.     PMH:  As per HPI and below:  Past Medical History:   Diagnosis Date    Arthritis     Complete tear of left rotator cuff 11/09/2017    COPD (chronic obstructive pulmonary disease)     COVID-19 infection 07/02/2021    Diabetes mellitus     H/o Cerebrovascular accident (CVA) of left pontine structure 12/12/2019    Hypertension     Personal history of colonic polyps     Stroke     Wears glasses        Social History     Socioeconomic History    Marital status: Single   Occupational History     Comment: disabled due to shoulder problems   Tobacco Use    Smoking status: Never    Smokeless tobacco: Never   Substance and Sexual Activity    Alcohol use: No    Drug use: No    Sexual activity: Not Currently     Social Determinants of Health     Financial Resource Strain: Low Risk  (4/27/2023)    Overall Financial Resource Strain (CARDIA)     Difficulty of Paying Living Expenses: Not very hard   Food Insecurity: No Food Insecurity (4/27/2023)    Hunger Vital Sign     Worried About Running Out of Food in the Last Year: Never true     Ran Out of Food in the Last Year: Never true   Transportation Needs: No Transportation Needs (4/27/2023)    PRAPARE - Transportation     Lack of Transportation (Medical): No     Lack of Transportation (Non-Medical): No   Physical Activity: Inactive (4/27/2023)    Exercise Vital Sign     Days of Exercise per Week: 0 days     Minutes of Exercise per Session: 0 min   Stress: No Stress Concern Present (4/27/2023)    [employer] [place] [employer] - Occupational Stress Questionnaire     Feeling of Stress : Not at all   Social Connections: Moderately Integrated (4/27/2023)    Social Connection and Isolation Panel [NHANES]     Frequency of Communication with  Friends and Family: More than three times a week     Frequency of Social Gatherings with Friends and Family: More than three times a week     Attends [employer] Services: More than 4 times per year     Active Member of Clubs or Organizations: No     Attends Club or Organization Meetings: Never     Marital Status: Living with partner   Housing Stability: Low Risk  (4/27/2023)    Housing Stability Vital Sign     Unable to Pay for Housing in the Last Year: No     Number of Places Lived in the Last Year: 1     Unstable Housing in the Last Year: No       Past Surgical History:   Procedure Laterality Date    COLONOSCOPY N/A 4/11/2017    Procedure: COLONOSCOPY;  Surgeon: Jared Antonio MD;  Location: [facility] ENDO;  Service: Endoscopy;  Laterality: N/A;    HYSTERECTOMY      OOPHORECTOMY      SHOULDER SURGERY      R    TRANSESOPHAGEAL ECHOCARDIOGRAM WITH POSSIBLE CARDIOVERSION (GT W/ POSS CARDIOVERSION) N/A 5/4/2023    Procedure: Transesophageal echo (GT) intra-procedure log documentation;  Surgeon: Blade Phillip MD;  Location: [facility] CATH/EP LAB;  Service: Cardiology;  Laterality: N/A;       Family History   Problem Relation Age of Onset    Diabetes Mother     Asthma Mother     Hypertension Mother     Diabetes Father     Diabetes Brother     Hypertension Brother     Anemia Daughter     Glaucoma Neg Hx     Macular degeneration Neg Hx     Retinal detachment Neg Hx        Review of patient's allergies indicates:  No Known Allergies    No current facility-administered medications on file prior to encounter.     Current Outpatient Medications on File Prior to Encounter   Medication Sig Dispense Refill    amLODIPine (NORVASC) 10 MG tablet Take 1 tablet (10 mg total) by mouth once daily. 90 tablet 3    apixaban (ELIQUIS) 2.5 mg Tab 2.5 mg by Per G Tube route 2 (two) times daily.      aspirin 81 MG Chew Take 1 tablet (81 mg total) by mouth once daily. 30 tablet 0    atorvastatin (LIPITOR) 80 MG tablet Take 1 tablet (80 mg total)  by mouth every evening. (Patient taking differently: 80 mg by Per G Tube route every evening.) 30 tablet 0    baclofen (LIORESAL) 5 mg Tab tablet 5 mg by Per G Tube route every 8 (eight) hours as needed (muscle spasms).      folic acid (FOLVITE) 1 MG tablet Take 1 tablet by mouth every morning.      insulin (LANTUS SOLOSTAR U-100 INSULIN) glargine 100 units/mL SubQ pen Inject 25 Units into the skin once daily.      insulin aspart U-100 (NOVOLOG) 100 unit/mL injection Inject 2 Units into the skin 3 (three) times daily before meals.      latanoprost 0.005 % ophthalmic solution Place 1 drop into both eyes once daily. (Patient taking differently: Place 1 drop into both eyes every evening.) 7.5 mL 6    levETIRAcetam (KEPPRA) 100 mg/mL Soln 500 mg by Per G Tube route 2 (two) times daily.      levoFLOXacin (LEVAQUIN) 750 MG tablet 750 mg by Per G Tube route once daily.      losartan (COZAAR) 100 MG tablet Take 1 tablet (100 mg total) by mouth once daily. (Patient taking differently: 100 mg by Per G Tube route once daily.) 90 tablet 3    magnesium oxide (MAG-OX) 400 mg (241.3 mg magnesium) tablet Take 400 mg by mouth once daily.      metFORMIN (GLUCOPHAGE) 500 MG tablet 1,000 mg by Per G Tube route 2 (two) times daily with meals.      metoprolol tartrate (LOPRESSOR) 25 MG tablet 25 mg by Per G Tube route 2 (two) times daily.      multivitamin (THERAGRAN) per tablet 1 tablet by Per G Tube route once daily.      polyethylene glycol (GLYCOLAX) 17 gram PwPk 17 g by Per G Tube route once daily.      thiamine (VITAMIN B-1) 100 MG tablet 100 mg by Per G Tube route once daily.      cloNIDine (CATAPRES) 0.1 MG tablet Take 1 tablet (0.1 mg total) by mouth 3 (three) times daily. 90 tablet 11    EScitalopram oxalate (LEXAPRO) 10 MG tablet Take 10 mg by mouth every evening.      insulin lispro 100 unit/mL injection Inject 5 Units into the skin 3 (three) times daily.      metoprolol succinate (TOPROL-XL) 50 MG 24 hr tablet Take 1 tablet  "(50 mg total) by mouth once daily. 30 tablet 0    minoxidiL (LONITEN) 10 MG Tab Take 10 mg by mouth 2 (two) times daily.      tiotropium (SPIRIVA) 18 mcg inhalation capsule Inhale 1 capsule (18 mcg total) into the lungs once daily. Controller 30 capsule 0    [DISCONTINUED] blood-glucose meter (TRUE METRIX GLUCOSE METER) kit Use as instructed 1 each 0    [DISCONTINUED] lancing device (TRUEDRAW LANCING DEVICE) Misc Inject 1 Device into the skin 2 (two) times daily. 1 each 3       ROS: As per HPI and below:  Review of systems not obtained due to patient factors.      Physical Exam:     Vitals:    10/25/23 0739 10/25/23 1200 10/25/23 1642 10/25/23 1917   BP: 110/63 128/66 126/60 122/60   BP Location:    Left arm   Patient Position:    Lying   Pulse: 88 67 80 95   Resp: 18 20 20 14   Temp: 97.5 °F (36.4 °C) 97.8 °F (36.6 °C) 98 °F (36.7 °C) 98 °F (36.7 °C)   TempSrc:   Oral Axillary   SpO2: 100% 100% 98% 99%   Weight:       Height:           BP  Min: 109/59  Max: 178/81  Temp  Av.1 °F (36.7 °C)  Min: 97 °F (36.1 °C)  Max: 99 °F (37.2 °C)  Pulse  Av.1  Min: 67  Max: 112  Resp  Av.9  Min: 14  Max: 24  SpO2  Av.3 %  Min: 89 %  Max: 100 %  Height  Av' 11" (149.9 cm)  Min: 4' 11" (149.9 cm)  Max: 4' 11" (149.9 cm)  Weight  Av.5 kg (122 lb 4.5 oz)  Min: 54.4 kg (120 lb)  Max: 56.5 kg (124 lb 9 oz)    Body mass index is 25.16 kg/m².          General:             Well developed, well nourished, no apparent distress  HEENT:              NCAT, no JVD, mucous membranes moist, EOM intact  Cardiovascular:  Regular rate and rhythm, normal S1, normal S2, No murmurs, rubs, or gallops  Respiratory:        Normal breath sounds, no wheezes, no rales, no rhonchi  Abdomen:           Bowel sounds present, non tender, non distended, no masses, no hepatojugular reflux  Extremities:        No clubbing, no cyanosis, no edema  Neurological:      No focal deficits  Skin:                   No obvious rashes or erythema, " right leg open wound, unstagable sacral pressure ulcer              Lab Results   Component Value Date    WBC 16.96 (H) 10/25/2023    HGB 7.6 (L) 10/25/2023    HCT 22.7 (L) 10/25/2023    MCV 76 (L) 10/25/2023     (H) 10/25/2023     Lab Results   Component Value Date    CHOL 92 (L) 08/04/2023    CHOL 157 05/02/2023    CHOL 156 02/22/2023     Lab Results   Component Value Date    HDL 38 (L) 08/04/2023    HDL 73 05/02/2023    HDL 56 02/22/2023     Lab Results   Component Value Date    LDLCALC 41.6 (L) 08/04/2023    LDLCALC 65.4 05/02/2023    LDLCALC 85.6 02/22/2023     Lab Results   Component Value Date    TRIG 62 08/04/2023    TRIG 93 05/02/2023    TRIG 72 02/22/2023     Lab Results   Component Value Date    CHOLHDL 41.3 08/04/2023    CHOLHDL 46.5 05/02/2023    CHOLHDL 35.9 02/22/2023     CMP  Recent Labs   Lab 10/25/23  0522   GLU 89   CALCIUM 9.0   ALBUMIN 2.7*   PROT 6.7   *   K 3.8   CO2 25      BUN 20   CREATININE 0.6   ALKPHOS 114   ALT 14   AST 17   BILITOT 0.4      Lab Results   Component Value Date    TSH 1.908 10/24/2023         Assessment and Recommendations       Diagnoses:    1. Unstagable sacral pressure ulcer  2. Right knee/thigh open wound  Wounds were POA    Plan:  1. Medihoney + mepilex daily to all wounds    Complexity:    Moderate

## 2023-10-27 VITALS
TEMPERATURE: 98 F | HEART RATE: 73 BPM | RESPIRATION RATE: 18 BRPM | OXYGEN SATURATION: 98 % | WEIGHT: 124.56 LBS | DIASTOLIC BLOOD PRESSURE: 70 MMHG | HEIGHT: 59 IN | SYSTOLIC BLOOD PRESSURE: 133 MMHG | BODY MASS INDEX: 25.11 KG/M2

## 2023-10-27 PROBLEM — A41.9 SEPSIS: Status: RESOLVED | Noted: 2023-10-25 | Resolved: 2023-10-27

## 2023-10-27 PROBLEM — D72.829 LEUKOCYTOSIS: Status: RESOLVED | Noted: 2023-10-25 | Resolved: 2023-10-27

## 2023-10-27 LAB
ALBUMIN SERPL BCP-MCNC: 2.5 G/DL (ref 3.5–5.2)
ALP SERPL-CCNC: 108 U/L (ref 55–135)
ALT SERPL W/O P-5'-P-CCNC: 12 U/L (ref 10–44)
ANION GAP SERPL CALC-SCNC: 4 MMOL/L (ref 8–16)
AST SERPL-CCNC: 16 U/L (ref 10–40)
BACTERIA UR CULT: ABNORMAL
BASOPHILS # BLD AUTO: 0.08 K/UL (ref 0–0.2)
BASOPHILS NFR BLD: 0.6 % (ref 0–1.9)
BILIRUB SERPL-MCNC: 0.3 MG/DL (ref 0.1–1)
BUN SERPL-MCNC: 26 MG/DL (ref 8–23)
CALCIUM SERPL-MCNC: 8.3 MG/DL (ref 8.7–10.5)
CHLORIDE SERPL-SCNC: 103 MMOL/L (ref 95–110)
CO2 SERPL-SCNC: 28 MMOL/L (ref 23–29)
CREAT SERPL-MCNC: 0.6 MG/DL (ref 0.5–1.4)
DIFFERENTIAL METHOD BLD: ABNORMAL
EOSINOPHIL # BLD AUTO: 0.1 K/UL (ref 0–0.5)
EOSINOPHIL NFR BLD: 0.9 % (ref 0–8)
ERYTHROCYTE [DISTWIDTH] IN BLOOD BY AUTOMATED COUNT: 17.1 % (ref 11.5–14.5)
EST. GFR  (NO RACE VARIABLE): >60 ML/MIN/1.73 M^2
GLUCOSE SERPL-MCNC: 244 MG/DL (ref 70–110)
GLUCOSE SERPL-MCNC: 244 MG/DL (ref 70–110)
GLUCOSE SERPL-MCNC: 267 MG/DL (ref 70–110)
GLUCOSE SERPL-MCNC: 294 MG/DL (ref 70–110)
GLUCOSE SERPL-MCNC: 302 MG/DL (ref 70–110)
HCT VFR BLD AUTO: 25 % (ref 37–48.5)
HGB BLD-MCNC: 8.6 G/DL (ref 12–16)
IMM GRANULOCYTES # BLD AUTO: 0.37 K/UL (ref 0–0.04)
IMM GRANULOCYTES NFR BLD AUTO: 2.9 % (ref 0–0.5)
LYMPHOCYTES # BLD AUTO: 1.7 K/UL (ref 1–4.8)
LYMPHOCYTES NFR BLD: 13.2 % (ref 18–48)
MAGNESIUM SERPL-MCNC: 1.9 MG/DL (ref 1.6–2.6)
MCH RBC QN AUTO: 26.4 PG (ref 27–31)
MCHC RBC AUTO-ENTMCNC: 34.4 G/DL (ref 32–36)
MCV RBC AUTO: 77 FL (ref 82–98)
MONOCYTES # BLD AUTO: 1.3 K/UL (ref 0.3–1)
MONOCYTES NFR BLD: 10.2 % (ref 4–15)
NEUTROPHILS # BLD AUTO: 9.4 K/UL (ref 1.8–7.7)
NEUTROPHILS NFR BLD: 72.2 % (ref 38–73)
NRBC BLD-RTO: 0 /100 WBC
PLATELET # BLD AUTO: 742 K/UL (ref 150–450)
PMV BLD AUTO: 9.8 FL (ref 9.2–12.9)
POTASSIUM SERPL-SCNC: 4.6 MMOL/L (ref 3.5–5.1)
PROT SERPL-MCNC: 5.9 G/DL (ref 6–8.4)
RBC # BLD AUTO: 3.26 M/UL (ref 4–5.4)
SODIUM SERPL-SCNC: 135 MMOL/L (ref 136–145)
WBC # BLD AUTO: 12.97 K/UL (ref 3.9–12.7)
WBC #/AREA STL HPF: NORMAL /[HPF]

## 2023-10-27 PROCEDURE — 25000003 PHARM REV CODE 250: Performed by: INTERNAL MEDICINE

## 2023-10-27 PROCEDURE — 83020 HEMOGLOBIN ELECTROPHORESIS: CPT

## 2023-10-27 PROCEDURE — 99900035 HC TECH TIME PER 15 MIN (STAT)

## 2023-10-27 PROCEDURE — 89055 LEUKOCYTE ASSESSMENT FECAL: CPT | Performed by: INTERNAL MEDICINE

## 2023-10-27 PROCEDURE — 85025 COMPLETE CBC W/AUTO DIFF WBC: CPT | Performed by: INTERNAL MEDICINE

## 2023-10-27 PROCEDURE — 30000890 HC MISC. SEND OUT TEST

## 2023-10-27 PROCEDURE — 30000890 LABCORP MISCELLANEOUS TEST: Performed by: INTERNAL MEDICINE

## 2023-10-27 PROCEDURE — 94761 N-INVAS EAR/PLS OXIMETRY MLT: CPT

## 2023-10-27 PROCEDURE — 80053 COMPREHEN METABOLIC PANEL: CPT | Performed by: INTERNAL MEDICINE

## 2023-10-27 PROCEDURE — 83735 ASSAY OF MAGNESIUM: CPT | Performed by: INTERNAL MEDICINE

## 2023-10-27 PROCEDURE — 94799 UNLISTED PULMONARY SVC/PX: CPT

## 2023-10-27 PROCEDURE — 63600175 PHARM REV CODE 636 W HCPCS: Performed by: INTERNAL MEDICINE

## 2023-10-27 PROCEDURE — 36415 COLL VENOUS BLD VENIPUNCTURE: CPT | Performed by: INTERNAL MEDICINE

## 2023-10-27 PROCEDURE — 83020 HEMOGLOBIN ELECTROPHORESIS: CPT | Performed by: INTERNAL MEDICINE

## 2023-10-27 RX ORDER — AMOXICILLIN 250 MG/5ML
500 POWDER, FOR SUSPENSION ORAL EVERY 8 HOURS
Status: DISCONTINUED | OUTPATIENT
Start: 2023-10-27 | End: 2023-10-27

## 2023-10-27 RX ORDER — AMOXICILLIN 250 MG/5ML
500 POWDER, FOR SUSPENSION ORAL EVERY 8 HOURS
Status: DISCONTINUED | OUTPATIENT
Start: 2023-10-27 | End: 2023-10-27 | Stop reason: HOSPADM

## 2023-10-27 RX ORDER — AMOXICILLIN 250 MG/5ML
500 POWDER, FOR SUSPENSION ORAL EVERY 8 HOURS
Refills: 0
Start: 2023-10-27 | End: 2023-10-31

## 2023-10-27 RX ADMIN — AMOXICILLIN 500 MG: 250 POWDER, FOR SUSPENSION ORAL at 03:10

## 2023-10-27 RX ADMIN — LEVETIRACETAM 500 MG: 100 SOLUTION ORAL at 08:10

## 2023-10-27 RX ADMIN — METOPROLOL TARTRATE 25 MG: 25 TABLET, FILM COATED ORAL at 08:10

## 2023-10-27 RX ADMIN — FOLIC ACID 1000 MCG: 1 TABLET ORAL at 06:10

## 2023-10-27 RX ADMIN — INSULIN ASPART 6 UNITS: 100 INJECTION, SOLUTION INTRAVENOUS; SUBCUTANEOUS at 06:10

## 2023-10-27 RX ADMIN — CLONIDINE HYDROCHLORIDE 0.1 MG: 0.1 TABLET ORAL at 08:10

## 2023-10-27 RX ADMIN — APIXABAN 2.5 MG: 2.5 TABLET, FILM COATED ORAL at 08:10

## 2023-10-27 RX ADMIN — ASPIRIN 81 MG 81 MG: 81 TABLET ORAL at 08:10

## 2023-10-27 RX ADMIN — CLONIDINE HYDROCHLORIDE 0.1 MG: 0.1 TABLET ORAL at 03:10

## 2023-10-27 RX ADMIN — MUPIROCIN 1 G: 20 OINTMENT TOPICAL at 08:10

## 2023-10-27 RX ADMIN — THIAMINE HCL TAB 100 MG 100 MG: 100 TAB at 08:10

## 2023-10-27 RX ADMIN — AMLODIPINE BESYLATE 10 MG: 5 TABLET ORAL at 08:10

## 2023-10-27 RX ADMIN — VANCOMYCIN HYDROCHLORIDE 750 MG: 750 INJECTION, POWDER, LYOPHILIZED, FOR SOLUTION INTRAVENOUS at 06:10

## 2023-10-27 RX ADMIN — THERA TABS 1 TABLET: TAB at 08:10

## 2023-10-27 NOTE — PROGRESS NOTES
"[facility]  Adult Nutrition   Progress Note (Follow-Up)    SUMMARY     Recommendations  Recommendation/Intervention: 1. Continue Glucerna 1.5 @ 40 mL/hr(1440 kcal. 79 gm protein, 729 mL FW). Continue 30 mL every 4 hours free water flush.with additional free water 175 mL 3 times/day to meet needs.  Goals: 1. Patient to receive >/= 75% EEN / EPN. 2. Lab values trend to target range.  Nutrition Goal Status: goal met  Communication of RD Recs: reviewed with physician    Dietitian Rounds Brief  Patient sleeping with tube feeding infusing at goal rate. Patient with no abdominal distension, n/v, or residual per nursing note. Last BM 10/27/23. Plan is for patient to dc soon.      Diet order:   Current Diet Order: NPO                 Evaluation of Received Nutrient/Fluid Intake  Enteral Calories (kcal): 1440  Enteral Protein (gm): 79  Enteral (Free Water) Fluid (mL): 729  Free Water Flush Fluid (mL): 175  Energy Calories Required: meeting needs  Protein Required: meeting needs  Fluid Required: meeting needs  Tolerance: tolerating     % Intake of Estimated Energy Needs: 75 - 100 %  % Meal Intake: NPO      Intake/Output Summary (Last 24 hours) at 10/27/2023 1410  Last data filed at 10/27/2023 0852  Gross per 24 hour   Intake 2145 ml   Output 1700 ml   Net 445 ml        Anthropometrics  Temp: 98.2 °F (36.8 °C)  Height Method: Estimated (from previous record)  Height: 4' 11" (149.9 cm)  Height (inches): 59 in  Weight Method: Bed Scale  Weight: 56.5 kg (124 lb 9 oz)  Weight (lb): 124.56 lb  Ideal Body Weight (IBW), Female: 95 lb  % Ideal Body Weight, Female (lb): 131.12 %  BMI (Calculated): 25.1  BMI Grade: 25 - 29.9 - overweight       Estimated/Assessed Needs  Weight Used For Calorie Calculations: 56.5 kg (124 lb 9 oz)  Energy Calorie Requirements (kcal): 8627-2562 kcal/day (25-30 kcal/kg)  Energy Need Method: Kcal/kg  Protein Requirements: 67-84/day (1.2-1.5 gm/kg)  Weight Used For Protein Calculations: 56.5 kg " (124 lb 9 oz)     Estimated Fluid Requirement Method: RDA Method  RDA Method (mL): 1412       Reason for Assessment  Reason For Assessment: RD follow-up  Relevant Medical History: CVA with associated aphasia and dysphagia, COPD, diabetes, sacral wound, hypertension  Interdisciplinary Rounds: did not attend    Nutrition/Diet History  Factors Affecting Nutritional Intake: NPO, difficulty/impaired swallowing  Nutrition Support Formula Prior to Admit: Other (see commments) (unknown)    Nutrition Risk Screen  Nutrition Risk Screen: tube feeding or parenteral nutrition       Altered Skin Integrity 10/24/23 2309 Right anterior Knee Partial thickness tissue loss. Shallow open ulcer with a red or pink wound bed, without slough. Intact or Open/Ruptured Serum-filled blister.-Wound Image: Images linked       Altered Skin Integrity 10/24/23 2310 Right anterior;lower Shin Partial thickness tissue loss. Shallow open ulcer with a red or pink wound bed, without slough. Intact or Open/Ruptured Serum-filled blister.-Wound Image: Images linked       Altered Skin Integrity 10/24/23 2311 medial Coccyx Ulceration Full thickness tissue loss. Subcutaneous fat may be visible but bone, tendon or muscle are not exposed-Wound Image: Images linked  MST Score: 2  Have you recently lost weight without trying?: Unsure  Weight loss score: 2  Have you been eating poorly because of a decreased appetite?: No  Appetite score: 0       Weight History:  Wt Readings from Last 5 Encounters:   10/24/23 56.5 kg (124 lb 9 oz)   08/12/23 55.8 kg (123 lb 0.3 oz)   08/11/23 58 kg (127 lb 13.9 oz)   07/03/23 59 kg (130 lb)   06/27/23 50.4 kg (111 lb)        Lab/Procedures/Meds: Pertinent Labs/Meds Reviewed    Medications:Pertinent Medications Reviewed  Scheduled Meds:   amLODIPine  10 mg Per G Tube Daily    amoxicillin  500 mg Per G Tube Q8H    apixaban  2.5 mg Per G Tube BID    aspirin  81 mg Per G Tube Daily    atorvastatin  80 mg Per G Tube QHS    cloNIDine  0.1  "mg Per G Tube TID    EScitalopram oxalate  10 mg Per G Tube QHS    folic acid  1,000 mcg Per G Tube QAM    latanoprost  1 drop Both Eyes QHS    levetiracetam  500 mg Per G Tube BID    metoprolol tartrate  25 mg Per G Tube BID    multivitamin  1 tablet Per G Tube Daily    mupirocin   Nasal BID    polyethylene glycol  17 g Per G Tube Daily    thiamine  100 mg Per G Tube Daily     Continuous Infusions:  PRN Meds:.0.9%  NaCl infusion (for blood administration), dextrose 50%, dextrose 50%, glucagon (human recombinant), glucose, glucose, insulin aspart U-100, ipratropium, naloxone, potassium bicarbonate, potassium bicarbonate, potassium bicarbonate, sodium chloride 0.9%    Labs: Pertinent Labs Reviewed  Clinical Chemistry:  Recent Labs   Lab 10/24/23  1459 10/24/23  1459 10/25/23  0522 10/26/23  0519 10/27/23  0543     --  135* 133* 135*   K 4.4  --  3.8 4.3 4.6     --  102 101 103   CO2 25  --  25 24 28   GLU 88  --  89 241* 244*   BUN 29*  --  20 25* 26*   CREATININE 0.6  --  0.6 0.6 0.6   CALCIUM 9.2  --  9.0 8.3* 8.3*   PROT 7.6  --  6.7 5.9* 5.9*   ALBUMIN 3.1*  --  2.7* 2.5* 2.5*   BILITOT 0.4  --  0.4 0.2 0.3   ALKPHOS 128  --  114 104 108   AST 18  --  17 13 16   ALT 17  --  14 12 12   ANIONGAP 10  --  8 8 4*   MG  --    < > 1.8 1.9 1.9   LIPASE 61*  --  63*  --   --     < > = values in this interval not displayed.     CBC:   Recent Labs   Lab 10/27/23  0543   WBC 12.97*   RBC 3.26*   HGB 8.6*   HCT 25.0*   *   MCV 77*   MCH 26.4*   MCHC 34.4     Lipid Panel:  No results for input(s): "CHOL", "HDL", "LDLCALC", "TRIG", "CHOLHDL" in the last 168 hours.  Cardiac Profile:  No results for input(s): "BNP", "CPK", "CPKMB", "TROPONINI", "CKTOTAL" in the last 168 hours.  Inflammatory Labs:  Recent Labs   Lab 10/25/23  0522   CRP 92.6*     Diabetes:  No results for input(s): "HGBA1C", "POCTGLUCOSE" in the last 168 hours.  Thyroid & Parathyroid:  Recent Labs   Lab 10/24/23  1459   TSH 1.908       Monitor " and Evaluation  Food and Nutrient Intake: energy intake, food and beverage intake  Food and Nutrient Adminstration: diet order  Knowledge/Beliefs/Attitudes: food and nutrition knowledge/skill  Physical Activity and Function: nutrition-related ADLs and IADLs  Biochemical Data, Medical Tests and Procedures: electrolyte and renal panel, gastrointestinal profile, glucose/endocrine profile, inflammatory profile, lipid profile  Nutrition-Focused Physical Findings: overall appearance     Nutrition Risk  Level of Risk/Frequency of Follow-up: moderate     Nutrition Follow-Up  RD Follow-up?: Yes      Jaelyn Mireles RD, LDN 10/27/2023 2:10 PM

## 2023-10-27 NOTE — PLAN OF CARE
AVS and clinical packet faxed to Caridad at [facility] for review.         10/27/23 2397   Post-Acute Status   Post-Acute Authorization Placement   Post-Acute Placement Status Pending post-acute provider review/more information requested

## 2023-10-27 NOTE — NURSING
Report called to Laureen. Patient discharged in stable condition without any complications at baseline and taken via sheila back to [facility]. All patients personal belongings taken with sheila.

## 2023-10-27 NOTE — PROGRESS NOTES
Pharmacy Consult Discontinuation: Vancomycin    Steff Johnson [id number] is a 63 y.o. female was consulted for vancomycin pharmacotherapy management by pharmacy.    Pharmacy consult for vancomycin dosing is no longer required.  Vancomycin was discontinued 10/27/2023 @ 0824 by Dr. Cochran    Thank you for allowing us to participate in this patient's care. Should you have any questions or concerns please feel free to contact the pharmacy department at [phone].    Benja Villegas RPH

## 2023-10-27 NOTE — PLAN OF CARE
Urine culture with Enterococcus faecalis sensitive to ampicillin.       INFECTIOUS DISEASE DISCHARGE RECOMMENDATIONS  When patient is ready for discharge, infectious disease recommends the following:    Continue amoxicillin 500 mg oral solution 3 times a day per PEG tube for 4 more days through October 30th  Change urinary catheter every 3 weeks   Chlorhexidine baths daily while at facility  No need to follow-up with Infectious Disease    Roberta HELTON  [facility] Infectious Disease  10/27/2023  9:28 AM

## 2023-10-27 NOTE — PLAN OF CARE
Per Rolly at [facility], report can be called to Laureen @ [phone]  CM to secure [facility].   Pt is clear for dc from CM standpoint. Returning to [facility]    Adt30 placed for transport       10/27/23 1427   Final Note   Assessment Type Final Discharge Note   Anticipated Discharge Disposition Int Care Fac   Post-Acute Status   Post-Acute Authorization Placement   Post-Acute Placement Status Set-up Complete/Auth obtained   Discharge Delays (!) PFC Arranged Transportation

## 2023-10-27 NOTE — PLAN OF CARE
Problem: Infection  Goal: Absence of Infection Signs and Symptoms  Outcome: Met     Problem: Adult Inpatient Plan of Care  Goal: Plan of Care Review  Outcome: Met  Goal: Patient-Specific Goal (Individualized)  Outcome: Met  Goal: Absence of Hospital-Acquired Illness or Injury  Outcome: Met  Goal: Optimal Comfort and Wellbeing  Outcome: Met  Goal: Readiness for Transition of Care  Outcome: Met     Problem: Diabetes Comorbidity  Goal: Blood Glucose Level Within Targeted Range  Outcome: Met     Problem: Impaired Wound Healing  Goal: Optimal Wound Healing  Outcome: Met     Problem: Skin Injury Risk Increased  Goal: Skin Health and Integrity  Outcome: Met     Problem: Fall Injury Risk  Goal: Absence of Fall and Fall-Related Injury  Outcome: Met     Problem: Adjustment to Illness (Sepsis/Septic Shock)  Goal: Optimal Coping  Outcome: Met     Problem: Bleeding (Sepsis/Septic Shock)  Goal: Absence of Bleeding  Outcome: Met     Problem: Glycemic Control Impaired (Sepsis/Septic Shock)  Goal: Blood Glucose Level Within Desired Range  Outcome: Met     Problem: Infection Progression (Sepsis/Septic Shock)  Goal: Absence of Infection Signs and Symptoms  Outcome: Met     Problem: Nutrition Impaired (Sepsis/Septic Shock)  Goal: Optimal Nutrition Intake  Outcome: Met

## 2023-10-27 NOTE — SUBJECTIVE & OBJECTIVE
Interval History:       Review of Systems   Unable to perform ROS: Patient nonverbal     Objective:     Vital Signs (Most Recent):  Temp: 98.9 °F (37.2 °C) (10/26/23 2054)  Pulse: 81 (10/26/23 2054)  Resp: 20 (10/26/23 2054)  BP: 135/74 (10/26/23 2054)  SpO2: 95 % (10/26/23 2054) Vital Signs (24h Range):  Temp:  [97.6 °F (36.4 °C)-98.9 °F (37.2 °C)] 98.9 °F (37.2 °C)  Pulse:  [70-97] 81  Resp:  [14-20] 20  SpO2:  [95 %-99 %] 95 %  BP: (106-141)/(47-74) 135/74     Weight: 56.5 kg (124 lb 9 oz)  Body mass index is 25.16 kg/m².    Intake/Output Summary (Last 24 hours) at 10/26/2023 2157  Last data filed at 10/26/2023 1545  Gross per 24 hour   Intake 1235 ml   Output 400 ml   Net 835 ml         Physical Exam  Vitals and nursing note reviewed.   Constitutional:       General: She is not in acute distress.     Appearance: Normal appearance.   HENT:      Head: Atraumatic.      Right Ear: External ear normal.      Left Ear: External ear normal.      Nose: Nose normal.      Mouth/Throat:      Mouth: Mucous membranes are dry.   Cardiovascular:      Rate and Rhythm: Normal rate.   Pulmonary:      Effort: Pulmonary effort is normal.   Abdominal:      Palpations: Abdomen is soft.   Musculoskeletal:         General: Normal range of motion.      Cervical back: Normal range of motion.   Skin:     General: Skin is warm.   Neurological:      Mental Status: Mental status is at baseline.             Significant Labs: All pertinent labs within the past 24 hours have been reviewed.  CBC:   Recent Labs   Lab 10/25/23  0522 10/26/23  0519   WBC 16.96* 14.13*   HGB 7.6* 6.8*   HCT 22.7* 19.6*   * 904*     CMP:   Recent Labs   Lab 10/25/23  0522 10/26/23  0519   * 133*   K 3.8 4.3    101   CO2 25 24   GLU 89 241*   BUN 20 25*   CREATININE 0.6 0.6   CALCIUM 9.0 8.3*   PROT 6.7 5.9*   ALBUMIN 2.7* 2.5*   BILITOT 0.4 0.2   ALKPHOS 114 104   AST 17 13   ALT 14 12   ANIONGAP 8 8       Significant Imaging: I have reviewed all  pertinent imaging results/findings within the past 24 hours.

## 2023-10-27 NOTE — PROGRESS NOTES
[facility] Medicine  Progress Note    Patient Name: Steff Johnson  MRN: [id number]  Patient Class: IP- Inpatient   Admission Date: 10/24/2023  Length of Stay: 2 days  Attending Physician: Roger Berry MD  Primary Care Provider: Cristina Ren MD        Subjective:     Principal Problem:Sepsis        HPI:  No notes on file    Overview/Hospital Course:  10/25  Pt stable  Not in distress  On baseline pt is aphasic     10/26  MRI sacrum is normal  No new issues  Hb levels low and pt had pRBC       Interval History:       Review of Systems   Unable to perform ROS: Patient nonverbal     Objective:     Vital Signs (Most Recent):  Temp: 98.9 °F (37.2 °C) (10/26/23 2054)  Pulse: 81 (10/26/23 2054)  Resp: 20 (10/26/23 2054)  BP: 135/74 (10/26/23 2054)  SpO2: 95 % (10/26/23 2054) Vital Signs (24h Range):  Temp:  [97.6 °F (36.4 °C)-98.9 °F (37.2 °C)] 98.9 °F (37.2 °C)  Pulse:  [70-97] 81  Resp:  [14-20] 20  SpO2:  [95 %-99 %] 95 %  BP: (106-141)/(47-74) 135/74     Weight: 56.5 kg (124 lb 9 oz)  Body mass index is 25.16 kg/m².    Intake/Output Summary (Last 24 hours) at 10/26/2023 2157  Last data filed at 10/26/2023 1545  Gross per 24 hour   Intake 1235 ml   Output 400 ml   Net 835 ml         Physical Exam  Vitals and nursing note reviewed.   Constitutional:       General: She is not in acute distress.     Appearance: Normal appearance.   HENT:      Head: Atraumatic.      Right Ear: External ear normal.      Left Ear: External ear normal.      Nose: Nose normal.      Mouth/Throat:      Mouth: Mucous membranes are dry.   Cardiovascular:      Rate and Rhythm: Normal rate.   Pulmonary:      Effort: Pulmonary effort is normal.   Abdominal:      Palpations: Abdomen is soft.   Musculoskeletal:         General: Normal range of motion.      Cervical back: Normal range of motion.   Skin:     General: Skin is warm.   Neurological:      Mental Status: Mental status is at baseline.             Significant Labs: All  pertinent labs within the past 24 hours have been reviewed.  CBC:   Recent Labs   Lab 10/25/23  0522 10/26/23  0519   WBC 16.96* 14.13*   HGB 7.6* 6.8*   HCT 22.7* 19.6*   * 904*     CMP:   Recent Labs   Lab 10/25/23  0522 10/26/23  0519   * 133*   K 3.8 4.3    101   CO2 25 24   GLU 89 241*   BUN 20 25*   CREATININE 0.6 0.6   CALCIUM 9.0 8.3*   PROT 6.7 5.9*   ALBUMIN 2.7* 2.5*   BILITOT 0.4 0.2   ALKPHOS 114 104   AST 17 13   ALT 14 12   ANIONGAP 8 8       Significant Imaging: I have reviewed all pertinent imaging results/findings within the past 24 hours.      Assessment/Plan:      * Sepsis  Sepsis, secondary to polymicrobial bacteremia;   Enterococcus spp, Proteus, and Klebsiella   Maintain iv abx  ID on Board      CVA (cerebral vascular accident)  H/o CVA  Pt has history of  stroke with expressive aphasia, right upper extremity paralysis,      Pressure injury of sacral region, unstageable  This is a chronic ongoing issue       Leukocytosis  Aware       Microcytic anemia  S/p pRBC on 10/26      Thrombocytosis  Mostly  from infection  Hematology Consulted       Aphasia  Chronic issue       Cognitive communication deficit  Chronic issue       Type 2 diabetes mellitus with both eyes affected by moderate nonproliferative retinopathy and macular edema, with long-term current use of insulin  Maintain SSI    Hypertension associated with diabetes  Aware         VTE Risk Mitigation (From admission, onward)         Ordered     apixaban tablet 2.5 mg  2 times daily         10/24/23 2131     IP VTE HIGH RISK PATIENT  Once         10/24/23 2053     Place sequential compression device  Until discontinued         10/24/23 2053                Discharge Planning   ALEX: 10/28/2023     Code Status: Full Code   Is the patient medically ready for discharge?:     Reason for patient still in hospital (select all that apply): Treatment  Discharge Plan A: Return to nursing home                  Roger Berry  MD  Department of Hospital Medicine   [facility]

## 2023-10-27 NOTE — DISCHARGE SUMMARY
[facility] Medicine  Discharge Summary      Patient Name: Steff Johnson  MRN: [id number]  [facility]: [id number]  Patient Class: IP- Inpatient  Admission Date: 10/24/2023  Hospital Length of Stay: 3 days  Discharge Date and Time:  10/27/2023 1:20 PM  Attending Physician: Roger Berry MD   Discharging Provider: Roger Berry MD  Primary Care Provider: Cristina Ren MD    Primary Care Team: Networked reference to record PCT     HPI:   No notes on file    * No surgery found *      Hospital Course:   Pt got admitted with sepsis after blood culture done in facility where she stays grew Enterococcus spp, Proteus, and Klebsiella  Repeat BC were normal  Pt was evaluated by ID MD/Wound care MD/Hematologist  and Urologist  MRI Sacrum/Coccyx were normal  Pt had acute on chronic anemia with no evidence of bleed and received one unit Prbc   Later pt was discharged back to facility with short term course of PO amoxicillin        Goals of Care Treatment Preferences:  Code Status: Full Code          What is most important right now is to focus on avoiding the hospital, remaining as independent as possible, symptom/pain control, improvement in condition but with limits to invasive therapies.  Accordingly, we have decided that the best plan to meet the patient's goals includes continuing with treatment.      Consults:   Consults (From admission, onward)        Status Ordering Provider     Inpatient consult to Social Work/Case Management  Once        Provider:  (Not yet assigned)    MITCH Ceballos     Inpatient consult to Urology  Once        Provider:  Lauren Viera MD    Completed CECILIA BOLAND     Inpatient consult to Registered Dietitian/Nutritionist  Once        Provider:  (Not yet assigned)    Completed CACHORRO SPICER     Inpatient consult to Hematology/Oncology  Once        Provider:  PARUL Crawley MD    Completed CACHORRO SPICER     Inpatient consult to  Registered Dietitian/Nutritionist  Once        Provider:  (Not yet assigned)    Completed CACHORRO SPICER     Inpatient consult to Infectious Diseases  Once        Provider:  Rae Ramirez MD    Completed CACHORRO SPICER     Inpatient consult to Hematology Oncology  Once        Provider:  PARUL Crawley MD    Acknowledged CACHORRO SPICER          No new Assessment & Plan notes have been filed under this hospital service since the last note was generated.  Service: Hospital Medicine    Final Active Diagnoses:    Diagnosis Date Noted POA    CVA (cerebral vascular accident) [I63.9] 12/12/2019 Yes    Thrombocytosis [D75.839] 10/25/2023 Unknown    Microcytic anemia [D50.9] 10/25/2023 Unknown    Pressure injury of sacral region, unstageable [L89.150] 10/25/2023 Unknown    Cognitive communication deficit [R41.841] 05/31/2023 Yes    Aphasia [R47.01] 05/31/2023 Yes    Type 2 diabetes mellitus with both eyes affected by moderate nonproliferative retinopathy and macular edema, with long-term current use of insulin [E11.3313, Z79.4] 08/07/2019 Not Applicable    Hypertension associated with diabetes [E11.59, I15.2] 03/31/2012 Yes      Problems Resolved During this Admission:    Diagnosis Date Noted Date Resolved POA    PRINCIPAL PROBLEM:  Sepsis [A41.9] 10/25/2023 10/27/2023 Unknown    Leukocytosis [D72.829] 10/25/2023 10/27/2023 Unknown       Discharged Condition: good    Disposition: Intermediate Care Facili*    Follow Up:    Patient Instructions:   No discharge procedures on file.    Significant Diagnostic Studies: Labs:   CMP   Recent Labs   Lab 10/26/23  0519 10/27/23  0543   * 135*   K 4.3 4.6    103   CO2 24 28   * 244*   BUN 25* 26*   CREATININE 0.6 0.6   CALCIUM 8.3* 8.3*   PROT 5.9* 5.9*   ALBUMIN 2.5* 2.5*   BILITOT 0.2 0.3   ALKPHOS 104 108   AST 13 16   ALT 12 12   ANIONGAP 8 4*    and CBC   Recent Labs   Lab 10/26/23  0519 10/27/23  0543   WBC 14.13* 12.97*    HGB 6.8* 8.6*   HCT 19.6* 25.0*   * 742*       Pending Diagnostic Studies:     Procedure Component Value Units Date/Time    Erythropoietin [[id number]] Collected: 10/26/23 0519    Order Status: Sent Lab Status: In process Updated: 10/26/23 0529    Specimen: Blood     Hemoglobin Electrophoresis,Hgb A2 Fahad. [[id number]]     Order Status: Sent Lab Status: No result     Specimen: Blood          Medications:  Reconciled Home Medications:      Medication List      START taking these medications    amoxicillin 250 mg/5 mL suspension  Commonly known as: AMOXIL  10 mLs (500 mg total) by Per G Tube route every 8 (eight) hours. for 4 days        CHANGE how you take these medications    atorvastatin 80 MG tablet  Commonly known as: LIPITOR  Take 1 tablet (80 mg total) by mouth every evening.  What changed: how to take this     latanoprost 0.005 % ophthalmic solution  Place 1 drop into both eyes once daily.  What changed: when to take this     levETIRAcetam 100 mg/mL Soln  Commonly known as: KEPPRA  500 mg by Per G Tube route 2 (two) times daily.  What changed: Another medication with the same name was removed. Continue taking this medication, and follow the directions you see here.     losartan 100 MG tablet  Commonly known as: COZAAR  Take 1 tablet (100 mg total) by mouth once daily.  What changed: how to take this        CONTINUE taking these medications    amLODIPine 10 MG tablet  Commonly known as: NORVASC  Take 1 tablet (10 mg total) by mouth once daily.     aspirin 81 MG Chew  Take 1 tablet (81 mg total) by mouth once daily.     baclofen 5 mg Tab tablet  Commonly known as: LIORESAL  5 mg by Per G Tube route every 8 (eight) hours as needed (muscle spasms).     cloNIDine 0.1 MG tablet  Commonly known as: CATAPRES  Take 1 tablet (0.1 mg total) by mouth 3 (three) times daily.     ELIQUIS 2.5 mg Tab  Generic drug: apixaban  2.5 mg by Per G Tube route 2 (two) times daily.     folic acid 1 MG tablet  Commonly known as:  FOLVITE  Take 1 tablet by mouth every morning.     insulin aspart U-100 100 unit/mL injection  Commonly known as: NovoLOG  Inject 2 Units into the skin 3 (three) times daily before meals.     LANTUS SOLOSTAR U-100 INSULIN glargine 100 units/mL SubQ pen  Generic drug: insulin  Inject 25 Units into the skin once daily.     magnesium oxide 400 mg (241.3 mg magnesium) tablet  Commonly known as: MAG-OX  Take 400 mg by mouth once daily.     metFORMIN 500 MG tablet  Commonly known as: GLUCOPHAGE  1,000 mg by Per G Tube route 2 (two) times daily with meals.     metoprolol succinate 50 MG 24 hr tablet  Commonly known as: TOPROL-XL  Take 1 tablet (50 mg total) by mouth once daily.     metoprolol tartrate 25 MG tablet  Commonly known as: LOPRESSOR  25 mg by Per G Tube route 2 (two) times daily.     minoxidiL 10 MG Tab  Commonly known as: LONITEN  Take 10 mg by mouth 2 (two) times daily.     multivitamin per tablet  Commonly known as: THERAGRAN  1 tablet by Per G Tube route once daily.     polyethylene glycol 17 gram Pwpk  Commonly known as: GLYCOLAX  17 g by Per G Tube route once daily.     SPIRIVA WITH HANDIHALER 18 mcg inhalation capsule  Generic drug: tiotropium  Inhale 1 capsule (18 mcg total) into the lungs once daily. Controller     VITAMIN B-1 100 MG tablet  Generic drug: thiamine  100 mg by Per G Tube route once daily.        STOP taking these medications    EScitalopram oxalate 10 MG tablet  Commonly known as: LEXAPRO     insulin lispro 100 unit/mL injection     levoFLOXacin 750 MG tablet  Commonly known as: LEVAQUIN            Indwelling Lines/Drains at time of discharge:   Lines/Drains/Airways     Drain  Duration                Gastrostomy/Enterostomy 10/25/23 1900 midline 1 day         Urethral Catheter 10/26/23 1900 <1 day              Physical Exam  Pulmonary:      Effort: No respiratory distress.   Neurological:      Mental Status: She is alert. Mental status is at baseline.       Time spent on the discharge of  patient: 45 minutes         Roger Berry MD  Department of Hospital Medicine  [facility]

## 2023-10-27 NOTE — ASSESSMENT & PLAN NOTE
Sepsis, secondary to polymicrobial bacteremia;   Enterococcus spp, Proteus, and Klebsiella   Maintain iv abx  ID on Board

## 2023-10-27 NOTE — DISCHARGE INSTRUCTIONS
[facility]  Facility Transfer Orders        Admit to: [facility]    Diagnoses:   Active Hospital Problems    Diagnosis  POA    Thrombocytosis [D75.839]  Unknown    Microcytic anemia [D50.9]  Unknown    Pressure injury of sacral region, unstageable [L89.150]  Unknown    Cognitive communication deficit [R41.841]  Yes    Aphasia [R47.01]  Yes    CVA (cerebral vascular accident) [I63.9]  Yes    Type 2 diabetes mellitus with both eyes affected by moderate nonproliferative retinopathy and macular edema, with long-term current use of insulin [E11.3313, Z79.4]  Not Applicable    Hypertension associated with diabetes [E11.59, I15.2]  Yes      Resolved Hospital Problems    Diagnosis Date Resolved POA    *Sepsis [A41.9] 10/27/2023 Unknown    Leukocytosis [D72.829] 10/27/2023 Unknown     Allergies: Review of patient's allergies indicates:  No Known Allergies    Code Status: full       Vitals: Routine       Diet: Glucerna 1.5 @ 40 mL/hr continuous would meet needs (1440 kcal. 79 gm protein, 729 mL FW). Suggest 30 mL every 4 hours free water flush.   Additional free water 175 mL 3 times/day to meet needs.     Should bolus be desired: Glucerna 1.5, 240 mL 4 times/day with 60 mL free water flush ( 30 mL before and after each bolus).    Pending Diagnostic Studies:       Procedure Component Value Units Date/Time    Erythropoietin [[id number]] Collected: 10/26/23 0519    Order Status: Sent Lab Status: In process Updated: 10/26/23 0529    Specimen: Blood     Hemoglobin Electrophoresis,Hgb A2 Fahad. [[id number]]     Order Status: Sent Lab Status: No result     Specimen: Blood             IV Access: no     Medications: Discontinue all previous medication orders, if any. See new list below.  Current Discharge Medication List        START taking these medications    Details   amoxicillin (AMOXIL) 250 mg/5 mL suspension 10 mLs (500 mg total) by Per G Tube route every 8 (eight) hours. for 4 days  Refills: 0            CONTINUE these medications which have NOT CHANGED    Details   amLODIPine (NORVASC) 10 MG tablet Take 1 tablet (10 mg total) by mouth once daily.  Qty: 90 tablet, Refills: 3    Comments: .      apixaban (ELIQUIS) 2.5 mg Tab 2.5 mg by Per G Tube route 2 (two) times daily.      aspirin 81 MG Chew Take 1 tablet (81 mg total) by mouth once daily.  Qty: 30 tablet, Refills: 0    Associated Diagnoses: Cerebrovascular accident (CVA) of left pontine structure      atorvastatin (LIPITOR) 80 MG tablet Take 1 tablet (80 mg total) by mouth every evening.  Qty: 30 tablet, Refills: 0    Associated Diagnoses: Mixed hyperlipidemia      baclofen (LIORESAL) 5 mg Tab tablet 5 mg by Per G Tube route every 8 (eight) hours as needed (muscle spasms).      folic acid (FOLVITE) 1 MG tablet Take 1 tablet by mouth every morning.      insulin (LANTUS SOLOSTAR U-100 INSULIN) glargine 100 units/mL SubQ pen Inject 25 Units into the skin once daily.      insulin aspart U-100 (NOVOLOG) 100 unit/mL injection Inject 2 Units into the skin 3 (three) times daily before meals.      latanoprost 0.005 % ophthalmic solution Place 1 drop into both eyes once daily.  Qty: 7.5 mL, Refills: 6    Associated Diagnoses: Ocular hypertension, bilateral      levETIRAcetam (KEPPRA) 100 mg/mL Soln 500 mg by Per G Tube route 2 (two) times daily.      losartan (COZAAR) 100 MG tablet Take 1 tablet (100 mg total) by mouth once daily.  Qty: 90 tablet, Refills: 3    Comments: .      magnesium oxide (MAG-OX) 400 mg (241.3 mg magnesium) tablet Take 400 mg by mouth once daily.      metFORMIN (GLUCOPHAGE) 500 MG tablet 1,000 mg by Per G Tube route 2 (two) times daily with meals.      metoprolol tartrate (LOPRESSOR) 25 MG tablet 25 mg by Per G Tube route 2 (two) times daily.      multivitamin (THERAGRAN) per tablet 1 tablet by Per G Tube route once daily.      polyethylene glycol (GLYCOLAX) 17 gram PwPk 17 g by Per G Tube route once daily.      thiamine (VITAMIN B-1) 100 MG  tablet 100 mg by Per G Tube route once daily.      cloNIDine (CATAPRES) 0.1 MG tablet Take 1 tablet (0.1 mg total) by mouth 3 (three) times daily.  Qty: 90 tablet, Refills: 11    Comments: .      metoprolol succinate (TOPROL-XL) 50 MG 24 hr tablet Take 1 tablet (50 mg total) by mouth once daily.  Qty: 30 tablet, Refills: 0    Comments: .      minoxidiL (LONITEN) 10 MG Tab Take 10 mg by mouth 2 (two) times daily.      tiotropium (SPIRIVA) 18 mcg inhalation capsule Inhale 1 capsule (18 mcg total) into the lungs once daily. Controller  Qty: 30 capsule, Refills: 0    Associated Diagnoses: Chronic obstructive pulmonary disease, unspecified COPD type           STOP taking these medications       levoFLOXacin (LEVAQUIN) 750 MG tablet Comments:   Reason for Stopping:         EScitalopram oxalate (LEXAPRO) 10 MG tablet Comments:   Reason for Stopping:         insulin lispro 100 unit/mL injection Comments:   Reason for Stopping:         levETIRAcetam (KEPPRA) 1000 MG tablet Comments:   Reason for Stopping:             Follow up:       Immunizations Administered as of 10/27/2023       Name Date Dose VIS Date Route Exp Date    COVID-19, MRNA, LN-S, PF (Moderna) 8/24/2021 -- -- -- --    Lot: [id number]     External: Auto Reconciled From Outside Source     COVID-19, MRNA, LN-S, PF (Moderna) 7/28/2021 -- -- -- --    Lot: [id number]     External: Auto Reconciled From Outside Source     COVID-19, MRNA, LN-S, PF (Pfizer) (Purple Cap) 8/24/2021 0.3 mL 5/10/2021 Intramuscular --    Site: Right deltoid     : [employer]     Lot: [id number]     Comment: Adminis     COVID-19, MRNA, LN-S, PF (Pfizer) (Purple Cap) 8/24/2021 0.3 mL -- Intramuscular --    Site: Right deltoid     : [employer]     Lot: [id number]     COVID-19, MRNA, LN-S, PF (Pfizer) (Purple Cap) 7/28/2021 0.3 mL 5/10/2021 Intramuscular --    Site: Left deltoid     : [employer]     Lot: [id number]     Comment: Adminis     COVID-19, MRNA, LN-S, PF (Pfizer) (Purple  Cap) 7/28/2021 0.3 mL -- Intramuscular --    Site: Left deltoid     : [employer]     Lot: [id number]

## 2023-10-27 NOTE — PROGRESS NOTES
Hematology/Oncology  Inpatient Progress Note  10/26/23  Patient Name: Steff Johnson  MRN: [id number]  Admission Date: 10/24/2023  Hospital Length of Stay: 2 days  Code Status: Full Code   Attending Provider: Roger Berry MD  Referring Provider: Dr. Sr  Consulting Provider: PARUL Crawley MD  Primary Care Physician: Cristina Ren MD  Principal Problem:Sepsis    Consults  Subjective:     Chief Complaint: Abnormal Labs        History Present Illness:  63 y.o. female with positive blood C/S,  Now on antibiotics.  Hx CVA, COPD, DM, HTN, sacral decubitus.    Noted moderate microcytic anemia, moderate leukocytosis and thrombocytosis.    Hgb 6.8 today, pRBC ordered.  Check Stool for occult blood.  Ferritin 746 is more consistent with anemia of chronic DX or hemoglobinopathy,  Goes against Iron deficiency.    Lab eval pending.        Past Medical/Surgical History:  Past Medical History:   Diagnosis Date    Arthritis     Complete tear of left rotator cuff 11/09/2017    COPD (chronic obstructive pulmonary disease)     COVID-19 infection 07/02/2021    Diabetes mellitus     H/o Cerebrovascular accident (CVA) of left pontine structure 12/12/2019    Hypertension     Personal history of colonic polyps     Stroke     Wears glasses      Past Surgical History:   Procedure Laterality Date    COLONOSCOPY N/A 4/11/2017    Procedure: COLONOSCOPY;  Surgeon: Jared Antonio MD;  Location: [facility];  Service: Endoscopy;  Laterality: N/A;    HYSTERECTOMY      OOPHORECTOMY      SHOULDER SURGERY      R    TRANSESOPHAGEAL ECHOCARDIOGRAM WITH POSSIBLE CARDIOVERSION (GT W/ POSS CARDIOVERSION) N/A 5/4/2023    Procedure: Transesophageal echo (GT) intra-procedure log documentation;  Surgeon: Blade Phillip MD;  Location: [facility] CATH/EP LAB;  Service: Cardiology;  Laterality: N/A;       Allergies:  Review of patient's allergies indicates:  No Known Allergies    Social/Family History:  Social History     Socioeconomic History    Marital  status: Single   Occupational History     Comment: disabled due to shoulder problems   Tobacco Use    Smoking status: Never    Smokeless tobacco: Never   Substance and Sexual Activity    Alcohol use: No    Drug use: No    Sexual activity: Not Currently     Social Determinants of Health     Financial Resource Strain: Low Risk  (4/27/2023)    Overall Financial Resource Strain (CARDIA)     Difficulty of Paying Living Expenses: Not very hard   Food Insecurity: No Food Insecurity (4/27/2023)    Hunger Vital Sign     Worried About Running Out of Food in the Last Year: Never true     Ran Out of Food in the Last Year: Never true   Transportation Needs: No Transportation Needs (4/27/2023)    PRAPARE - Transportation     Lack of Transportation (Medical): No     Lack of Transportation (Non-Medical): No   Physical Activity: Inactive (4/27/2023)    Exercise Vital Sign     Days of Exercise per Week: 0 days     Minutes of Exercise per Session: 0 min   Stress: No Stress Concern Present (4/27/2023)    [employer] [place] [employer] - Occupational Stress Questionnaire     Feeling of Stress : Not at all   Social Connections: Moderately Integrated (4/27/2023)    Social Connection and Isolation Panel [NHANES]     Frequency of Communication with Friends and Family: More than three times a week     Frequency of Social Gatherings with Friends and Family: More than three times a week     Attends Congregation Services: More than 4 times per year     Active Member of Clubs or Organizations: No     Attends Club or Organization Meetings: Never     Marital Status: Living with partner   Housing Stability: Low Risk  (4/27/2023)    Housing Stability Vital Sign     Unable to Pay for Housing in the Last Year: No     Number of Places Lived in the Last Year: 1     Unstable Housing in the Last Year: No     Family History   Problem Relation Age of Onset    Diabetes Mother     Asthma Mother     Hypertension Mother     Diabetes Father     Diabetes  "Brother     Hypertension Brother     Anemia Daughter     Glaucoma Neg Hx     Macular degeneration Neg Hx     Retinal detachment Neg Hx        ROS:    GEN: normal without any fever, night sweats or weight loss  HEENT: See HPI  CV: normal with no CP, SOB, PND, SEGUNDO or orthopnea  PULM: See HP{I  GI: normal with no abdominal pain, nausea, vomiting, constipation, diarrhea, melanotic stools, BRBPR, or hematemesis  : normal with no hematuria, dysuria  BREAST: normal with no mass, discharge, pain  SKIN: See HPI        Medications:  Continuous Infusions:  Scheduled Meds:   amLODIPine  10 mg Per G Tube Daily    apixaban  2.5 mg Per G Tube BID    aspirin  81 mg Per G Tube Daily    atorvastatin  80 mg Per G Tube QHS    cefTRIAXone (ROCEPHIN) IVPB  2 g Intravenous Q24H    cloNIDine  0.1 mg Per G Tube TID    EScitalopram oxalate  10 mg Per G Tube QHS    folic acid  1,000 mcg Per G Tube QAM    latanoprost  1 drop Both Eyes QHS    levetiracetam  500 mg Per G Tube BID    metoprolol tartrate  25 mg Per G Tube BID    multivitamin  1 tablet Per G Tube Daily    mupirocin   Nasal BID    polyethylene glycol  17 g Per G Tube Daily    thiamine  100 mg Per G Tube Daily    vancomycin (VANCOCIN) IV (PEDS and ADULTS)  750 mg Intravenous Q12H     PRN Meds:0.9%  NaCl infusion (for blood administration), dextrose 50%, dextrose 50%, glucagon (human recombinant), glucose, glucose, insulin aspart U-100, ipratropium, naloxone, potassium bicarbonate, potassium bicarbonate, potassium bicarbonate, sodium chloride 0.9%, Pharmacy to dose Vancomycin consult **AND** vancomycin - pharmacy to dose     Objective:     Vitals:  Blood pressure (!) 141/66, pulse 80, temperature 98.3 °F (36.8 °C), resp. rate 16, height 4' 11" (1.499 m), weight 56.5 kg (124 lb 9 oz), SpO2 96 %, not currently breastfeeding.    Physical Exam:  GEN: no apparent distress, comfortable  HEAD: atraumatic and normocephalic  EYES: no pallor, no icterus  ENT: external ears WNL; no nasal " discharge  NECK: no masses, thyroid normal, trachea midline, no LAD/LN's, supple  CV: RRR with no murmur; normal pulse; normal S1 and S2  CHEST: Normal respiratory effort; CTAB; normal breath sounds; no wheeze or crackles  ABDOM: nontender and nondistended; soft; no rebound/guarding, L/S NP  MUSC/Skeletal: no deformities or arthropathy  EXTREM: no clubbing, cyanosis, inflammation   SKIN: no rashes, lesions, ulcers, petechiae or subcutaneous nodules  : no del rosario  NEURO: Non verbal, she appears to follow my conversation  LYMPH: normal cervical, supraclavicular,  LN's    Hgb 7.6, WBC 17,000, plt cnt  874,000.  LFT's NL, creat. 0.6,     Today HGb     CXR, CT, MRI, U/S reports reviewed.    Today Hgb 6.8, MCV 76, Ferritin 746.    Assessment/Plan:     (1) 63 y.o. female with positive blood c/s,  Suspected sepsis, decubitus of skin.    (2)Moderate anemia, diff dx includes Fe deficiency, nutritional deficiency, renal insufficiency, chronic disease,   And hemoglobinopathy.    (3)Leukocytosis, diff dx includes 2nd to vitamin or Fe deficiency, leukemoid reaction 2nd sepsis, positive blood C/S.    (4)Thrombocytosis, 2nd vitamin or Fe deficiency, and sepsis with positive blood c/s.    (5)Myeloproliferative Syndrome is in differential.     Plan  Supplement with Fe infusion and vitamin supplement if deficient    Retic and erythropoitin,  EPO trial if deficient?    Working DX is anemia chronic dx, hemoglobinopathy or GI bleed.  Does not appear to be nutritional deficiency or iron deficiency per lab thus far.    pRBC being given, check CBC tomorrow,  Check stools for heme.      Active Diagnoses:    Diagnosis Date Noted POA    PRINCIPAL PROBLEM:  Sepsis [A41.9] 10/25/2023 Unknown    Thrombocytosis [D75.839] 10/25/2023 Unknown    Microcytic anemia [D50.9] 10/25/2023 Unknown    Leukocytosis [D72.829] 10/25/2023 Unknown    Pressure injury of sacral region, unstageable [L89.150] 10/25/2023 Unknown    Cognitive communication deficit  [R41.841] 05/31/2023 Yes    Aphasia [R47.01] 05/31/2023 Yes    CVA (cerebral vascular accident) [I63.9] 12/12/2019 Yes    Type 2 diabetes mellitus with both eyes affected by moderate nonproliferative retinopathy and macular edema, with long-term current use of insulin [E11.3313, Z79.4] 08/07/2019 Not Applicable    Hypertension associated with diabetes [E11.59, I15.2] 03/31/2012 Yes      Problems Resolved During this Admission:          PARUL Crawley MD  Hematology/Oncology  [facility]   10/26/23

## 2023-10-28 LAB — EPO SERPL-ACNC: 83.2 MIU/ML (ref 2.6–18.5)

## 2023-10-29 LAB
BACTERIA BLD CULT: NORMAL
BACTERIA BLD CULT: NORMAL

## 2023-10-30 LAB
LABCORP MISC TEST CODE: NORMAL
LABCORP MISC TEST NAME: NORMAL
LABCORP MISCELLANEOUS TEST: NORMAL

## 2023-11-14 NOTE — PHYSICIAN QUERY
PT Name: Steff Johnson  MR #: [id number]     Documentation Clarification      CDS/: Mayelin Perez               Contact information:[phone] or through epic messenger    This form is a permanent document in the medical record.     Query Date: November 13, 2023    By submitting this query, we are merely seeking further clarification of documentation. Please utilize your independent clinical judgment when addressing the question(s) below.    The Medical Record reflects the following:    Supporting Clinical Findings Location in Medical Record   Lactic acid 2.8 on 10/24  1.0 on 10/25 EPIC labs    Patient has significant thrombocytosis, elevated white blood cell count and elevated lactic.  ER Prov Note                                                                            Provider, please provide diagnosis or diagnoses associated with above clinical findings.    [  X ]    [   ] Acute Lactic acidosis    Chronic Lactic acidosis   [   ] Insignificant finding

## 2023-11-14 NOTE — PHYSICIAN QUERY
PT Name: Steff Johnson  MR #: [id number]    DOCUMENTATION CLARIFICATION     CDS/: Mayelin Perez               Contact information:[phone] or through epic messenger  This form is a permanent document in the medical record.    Query Date: November 13, 2023      By submitting this query, we are merely seeking further clarification of documentation.  Please utilize your independent clinical judgment when addressing the question(s) below.    The Medical Record reflects the following:    Clinical Information Location in Medical Record   Sepsis, secondary to polymicrobial bacteremia; Enterococcus spp, Proteus, and Klebsiella spp, s/p Levaquin x 1 likely from small unstageable sacral wound vs complicated  UTI in the setting of chronic Del Rosario and R mild hydronephrosis     Pt got admitted with sepsis after blood culture done in facility where she stays grew Enterococcus spp, Proteus, and Klebsiella     Urine culture-enterococcus faecalis on 10/24 ID consult 10/25        DC summary           Please clarify/confirm the Consultants diagnosis of sepsis due to complicated UTI in the setting of chronic del rosario and rt mild hydronephrosis:     [ X ] Diagnosis ruled in   [  ] Diagnosis ruled out   [  ] Other diagnosis (please specify): Sepsis due to....

## 2023-11-14 NOTE — PHYSICIAN QUERY
PT Name: Steff Johnson  MR #: [id number]    DOCUMENTATION CLARIFICATION      CDS/: Mayelin Perez               Contact information:[phone] or through epic messenger    This form is a permanent document in the medical record.      Query Date: November 13, 2023    By submitting this query, we are merely seeking further clarification of documentation. Please utilize your independent clinical judgment when addressing the question(s) below.    The Medical Record contains the following:   Indicators  Supporting Clinical Findings Location in Medical Record    Anemia documented Hgb 6.8 today, pRBC ordered.  Check Stool for occult blood.  Ferritin 746 is more consistent with anemia of chronic DX or hemoglobinopathy,  Goes against Iron deficiency.    Working DX is anemia chronic dx, hemoglobinopathy or GI bleed.  Does not appear to be nutritional deficiency or iron deficiency per lab thus far.     pRBC being given, check CBC tomorrow,  Check stools for heme.      Heme/Onc progress note 10/26    Other   Microcytic anemia [D50.9]      Pt had acute on chronic anemia with no evidence of bleed and received one unit Prbc  10/25/2023    DC summary     Provider, please specify diagnosis or diagnoses associated with above clinical findings.   [   ] Iron deficiency anemia    [   ] Anemia due to chronic disease (please specify chronic disease):        [ X   ] Anemia, unspecified and is a chronic ongoing issue   [   ] Other Hematological Diagnosis (please specify):

## 2023-11-21 ENCOUNTER — HOSPITAL ENCOUNTER (EMERGENCY)
Facility: HOSPITAL | Age: 63
Discharge: OTHER INSTITUTION | End: 2023-11-21
Attending: EMERGENCY MEDICINE
Payer: MEDICARE

## 2023-11-21 VITALS
OXYGEN SATURATION: 99 % | HEART RATE: 83 BPM | BODY MASS INDEX: 25 KG/M2 | DIASTOLIC BLOOD PRESSURE: 74 MMHG | HEIGHT: 59 IN | RESPIRATION RATE: 20 BRPM | TEMPERATURE: 98 F | SYSTOLIC BLOOD PRESSURE: 156 MMHG | WEIGHT: 124 LBS

## 2023-11-21 DIAGNOSIS — K94.20 PROBLEM WITH GASTROSTOMY TUBE: Primary | ICD-10-CM

## 2023-11-21 PROCEDURE — 25000003 PHARM REV CODE 250: Performed by: EMERGENCY MEDICINE

## 2023-11-21 PROCEDURE — 99283 EMERGENCY DEPT VISIT LOW MDM: CPT

## 2023-11-21 RX ORDER — LIDOCAINE HYDROCHLORIDE 20 MG/ML
10 JELLY TOPICAL
Status: COMPLETED | OUTPATIENT
Start: 2023-11-21 | End: 2023-11-21

## 2023-11-21 RX ADMIN — LIDOCAINE HYDROCHLORIDE 10 ML: 20 JELLY TOPICAL at 06:11

## 2023-11-21 NOTE — ED PROVIDER NOTES
Encounter Date: 11/21/2023       History     Chief Complaint   Patient presents with    peg tube     Peg tube not working     Patient essentially in the persistent vegetative state.  History of CVAs.  Patient with feeding tube.  Reportedly feeding tube clogged.  No other problems reported.  Patient unable to give history.      Review of patient's allergies indicates:  No Known Allergies  Past Medical History:   Diagnosis Date    Arthritis     Complete tear of left rotator cuff 11/09/2017    COPD (chronic obstructive pulmonary disease)     COVID-19 infection 07/02/2021    Diabetes mellitus     H/o Cerebrovascular accident (CVA) of left pontine structure 12/12/2019    Hypertension     Personal history of colonic polyps     Stroke     Wears glasses      Past Surgical History:   Procedure Laterality Date    COLONOSCOPY N/A 4/11/2017    Procedure: COLONOSCOPY;  Surgeon: Jared Antonio MD;  Location: [facility] ENDO;  Service: Endoscopy;  Laterality: N/A;    HYSTERECTOMY      OOPHORECTOMY      SHOULDER SURGERY      R    TRANSESOPHAGEAL ECHOCARDIOGRAM WITH POSSIBLE CARDIOVERSION (GT W/ POSS CARDIOVERSION) N/A 5/4/2023    Procedure: Transesophageal echo (GT) intra-procedure log documentation;  Surgeon: Blade Phillip MD;  Location: [facility] CATH/EP LAB;  Service: Cardiology;  Laterality: N/A;     Family History   Problem Relation Age of Onset    Diabetes Mother     Asthma Mother     Hypertension Mother     Diabetes Father     Diabetes Brother     Hypertension Brother     Anemia Daughter     Glaucoma Neg Hx     Macular degeneration Neg Hx     Retinal detachment Neg Hx      Social History     Tobacco Use    Smoking status: Never    Smokeless tobacco: Never   Substance Use Topics    Alcohol use: No    Drug use: No     Review of Systems   Unable to perform ROS: Other       Physical Exam     Initial Vitals [11/21/23 0638]   BP Pulse Resp Temp SpO2   (!) 156/80 84 20 98.2 °F (36.8 °C) 99 %      MAP       --         Physical  Exam    Nursing note and vitals reviewed.  Constitutional: She is not diaphoretic. No distress.   HENT:   Head: Normocephalic and atraumatic.   Eyes: Conjunctivae are normal.   Cardiovascular:  Normal rate.           Pulmonary/Chest: Breath sounds normal.   Abdominal: Abdomen is soft. Bowel sounds are normal. There is no abdominal tenderness.   Feeding tube in place.   Musculoskeletal:         General: Normal range of motion.     Neurological:   Patient with contractures.  Multiple decubitus    Skin: No rash noted.   Psychiatric:   Nonverbal         ED Course   Procedures  Labs Reviewed - No data to display       Imaging Results    None          Medications   LIDOcaine HCl 2% urojet 10 mL (10 mLs Topical (Top) Given 11/21/23 0630)     Medical Decision Making  Attempted to flush feeding tube.  Unable to flush.  Dye cook used to flush and aspirate feeding tube.  Was able to unclog feeding tube.  Tube is flushing easily with no problems now.  Otherwise no other acute problems identified.    Risk  Prescription drug management.                                   Clinical Impression:  Final diagnoses:  [K94.20] Problem with gastrostomy tube (Primary)          ED Disposition Condition    Discharge Stable          ED Prescriptions    None       Follow-up Information       Follow up With Specialties Details Why Contact Info Additional Information    [facility] - Emergency Dept Emergency Medicine  If symptoms worsen [address] [facility] [id number] [phone] 1st floor    Julien Escobar III, MD Gastroenterology Schedule an appointment as soon as possible for a visit  Schedule outpatient tube replacement [address] [facility] [address]  [phone]                Julien Khan MD  11/21/23 2885

## 2023-12-05 ENCOUNTER — HOSPITAL ENCOUNTER (INPATIENT)
Facility: HOSPITAL | Age: 63
LOS: 14 days | Discharge: NURSING, REHAB OR LONG-TERM CARE | DRG: 698 | End: 2023-12-19
Attending: EMERGENCY MEDICINE | Admitting: STUDENT IN AN ORGANIZED HEALTH CARE EDUCATION/TRAINING PROGRAM
Payer: MEDICARE

## 2023-12-05 DIAGNOSIS — R23.8 BLISTERS OF MULTIPLE SITES: ICD-10-CM

## 2023-12-05 DIAGNOSIS — R09.02 HYPOXIA: ICD-10-CM

## 2023-12-05 DIAGNOSIS — I48.92 ATRIAL FIBRILLATION AND FLUTTER: ICD-10-CM

## 2023-12-05 DIAGNOSIS — L89.153 PRESSURE INJURY OF SACRAL REGION, STAGE 3: ICD-10-CM

## 2023-12-05 DIAGNOSIS — T83.511A URINARY TRACT INFECTION ASSOCIATED WITH INDWELLING URETHRAL CATHETER, INITIAL ENCOUNTER: Primary | ICD-10-CM

## 2023-12-05 DIAGNOSIS — R65.20 SEVERE SEPSIS: ICD-10-CM

## 2023-12-05 DIAGNOSIS — A49.9 ESBL (EXTENDED SPECTRUM BETA-LACTAMASE) PRODUCING BACTERIA INFECTION: ICD-10-CM

## 2023-12-05 DIAGNOSIS — A41.9 SEVERE SEPSIS: ICD-10-CM

## 2023-12-05 DIAGNOSIS — L89.150 PRESSURE INJURY OF SACRAL REGION, UNSTAGEABLE: ICD-10-CM

## 2023-12-05 DIAGNOSIS — N13.30 HYDRONEPHROSIS, UNSPECIFIED HYDRONEPHROSIS TYPE: ICD-10-CM

## 2023-12-05 DIAGNOSIS — B49 FUNGEMIA: ICD-10-CM

## 2023-12-05 DIAGNOSIS — Z97.8 CHRONIC INDWELLING FOLEY CATHETER: ICD-10-CM

## 2023-12-05 DIAGNOSIS — J96.01 ACUTE RESPIRATORY FAILURE WITH HYPOXIA: ICD-10-CM

## 2023-12-05 DIAGNOSIS — D72.829 LEUKOCYTOSIS, UNSPECIFIED TYPE: ICD-10-CM

## 2023-12-05 DIAGNOSIS — R41.841 COGNITIVE COMMUNICATION DEFICIT: ICD-10-CM

## 2023-12-05 DIAGNOSIS — J69.0 ASPIRATION PNEUMONIA OF RIGHT LUNG DUE TO VOMIT, UNSPECIFIED PART OF LUNG: ICD-10-CM

## 2023-12-05 DIAGNOSIS — B96.4 BACTEREMIA DUE TO PROTEUS SPECIES: ICD-10-CM

## 2023-12-05 DIAGNOSIS — R78.81 BACTEREMIA DUE TO PROTEUS SPECIES: ICD-10-CM

## 2023-12-05 DIAGNOSIS — R47.01 APHASIA: ICD-10-CM

## 2023-12-05 DIAGNOSIS — N39.0 URINARY TRACT INFECTION ASSOCIATED WITH INDWELLING URETHRAL CATHETER, INITIAL ENCOUNTER: Primary | ICD-10-CM

## 2023-12-05 DIAGNOSIS — I48.91 ATRIAL FIBRILLATION AND FLUTTER: ICD-10-CM

## 2023-12-05 DIAGNOSIS — N17.9 AKI (ACUTE KIDNEY INJURY): ICD-10-CM

## 2023-12-05 DIAGNOSIS — R00.0 TACHYCARDIA: ICD-10-CM

## 2023-12-05 DIAGNOSIS — D75.839 THROMBOCYTOSIS: ICD-10-CM

## 2023-12-05 DIAGNOSIS — D64.9 ANEMIA, UNSPECIFIED TYPE: ICD-10-CM

## 2023-12-05 DIAGNOSIS — R11.10 VOMITING, UNSPECIFIED VOMITING TYPE, UNSPECIFIED WHETHER NAUSEA PRESENT: ICD-10-CM

## 2023-12-05 DIAGNOSIS — Z74.01 BEDRIDDEN: ICD-10-CM

## 2023-12-05 DIAGNOSIS — Z16.12 ESBL (EXTENDED SPECTRUM BETA-LACTAMASE) PRODUCING BACTERIA INFECTION: ICD-10-CM

## 2023-12-05 DIAGNOSIS — I63.9 CEREBROVASCULAR ACCIDENT (CVA), UNSPECIFIED MECHANISM: ICD-10-CM

## 2023-12-05 PROBLEM — E87.5 HYPERKALEMIA: Status: ACTIVE | Noted: 2023-12-05

## 2023-12-05 LAB
ALBUMIN SERPL BCP-MCNC: 3.3 G/DL (ref 3.5–5.2)
ALLENS TEST: ABNORMAL
ALP SERPL-CCNC: 98 U/L (ref 55–135)
ALT SERPL W/O P-5'-P-CCNC: 30 U/L (ref 10–44)
ANION GAP SERPL CALC-SCNC: 13 MMOL/L (ref 8–16)
APTT PPP: 29.9 SEC (ref 21–32)
AST SERPL-CCNC: 35 U/L (ref 10–40)
BACTERIA #/AREA URNS HPF: ABNORMAL /HPF
BILIRUB SERPL-MCNC: 0.5 MG/DL (ref 0.1–1)
BILIRUB UR QL STRIP: NEGATIVE
BNP SERPL-MCNC: 101 PG/ML (ref 0–99)
BUN SERPL-MCNC: 67 MG/DL (ref 8–23)
CALCIUM SERPL-MCNC: 9.6 MG/DL (ref 8.7–10.5)
CHLORIDE SERPL-SCNC: 94 MMOL/L (ref 95–110)
CLARITY UR: ABNORMAL
CO2 SERPL-SCNC: 28 MMOL/L (ref 23–29)
COLOR UR: ABNORMAL
CREAT SERPL-MCNC: 1.7 MG/DL (ref 0.5–1.4)
DELSYS: ABNORMAL
EST. GFR  (NO RACE VARIABLE): 33.1 ML/MIN/1.73 M^2
FLOW: 15
GLUCOSE SERPL-MCNC: 221 MG/DL (ref 70–110)
GLUCOSE SERPL-MCNC: 239 MG/DL (ref 70–110)
GLUCOSE UR QL STRIP: NEGATIVE
HCO3 UR-SCNC: 29.3 MMOL/L (ref 24–28)
HCT VFR BLD CALC: 27 %PCV (ref 36–54)
HGB UR QL STRIP: ABNORMAL
HYALINE CASTS #/AREA URNS LPF: 28 /LPF
INFLUENZA A, MOLECULAR: NEGATIVE
INFLUENZA B, MOLECULAR: NEGATIVE
INR PPP: 1.1 (ref 0.8–1.2)
KETONES UR QL STRIP: NEGATIVE
LACTATE SERPL-SCNC: 3.8 MMOL/L (ref 0.5–1.9)
LEUKOCYTE ESTERASE UR QL STRIP: ABNORMAL
MICROSCOPIC COMMENT: ABNORMAL
MODE: ABNORMAL
NITRITE UR QL STRIP: NEGATIVE
PCO2 BLDA: 34.3 MMHG (ref 35–45)
PH SMN: 7.54 [PH] (ref 7.35–7.45)
PH UR STRIP: >8 [PH] (ref 5–8)
PO2 BLDA: 248 MMHG (ref 80–100)
POC BE: 7 MMOL/L
POC IONIZED CALCIUM: 1.18 MMOL/L (ref 1.06–1.42)
POC SATURATED O2: 100 % (ref 95–100)
POC TCO2: 30 MMOL/L (ref 23–27)
POTASSIUM BLD-SCNC: 5.1 MMOL/L (ref 3.5–5.1)
POTASSIUM SERPL-SCNC: 5.2 MMOL/L (ref 3.5–5.1)
PROCALCITONIN SERPL IA-MCNC: 133.67 NG/ML (ref 0–0.5)
PROT SERPL-MCNC: 7.4 G/DL (ref 6–8.4)
PROT UR QL STRIP: ABNORMAL
PROTHROMBIN TIME: 12 SEC (ref 9–12.5)
RBC #/AREA URNS HPF: >100 /HPF (ref 0–4)
SAMPLE: ABNORMAL
SARS-COV-2 RDRP RESP QL NAA+PROBE: NEGATIVE
SITE: ABNORMAL
SODIUM BLD-SCNC: 133 MMOL/L (ref 136–145)
SODIUM SERPL-SCNC: 135 MMOL/L (ref 136–145)
SP GR UR STRIP: 1.01 (ref 1–1.03)
SPECIMEN SOURCE: NORMAL
SQUAMOUS #/AREA URNS HPF: 0 /HPF
TROPONIN I SERPL HS-MCNC: 6.2 PG/ML (ref 0–14.9)
URN SPEC COLLECT METH UR: ABNORMAL
UROBILINOGEN UR STRIP-ACNC: NEGATIVE EU/DL
WBC #/AREA URNS HPF: >100 /HPF (ref 0–5)

## 2023-12-05 PROCEDURE — 80053 COMPREHEN METABOLIC PANEL: CPT | Performed by: EMERGENCY MEDICINE

## 2023-12-05 PROCEDURE — 25000003 PHARM REV CODE 250: Performed by: EMERGENCY MEDICINE

## 2023-12-05 PROCEDURE — 85007 BL SMEAR W/DIFF WBC COUNT: CPT | Performed by: EMERGENCY MEDICINE

## 2023-12-05 PROCEDURE — 87186 SC STD MICRODIL/AGAR DIL: CPT | Performed by: EMERGENCY MEDICINE

## 2023-12-05 PROCEDURE — 87086 URINE CULTURE/COLONY COUNT: CPT | Performed by: EMERGENCY MEDICINE

## 2023-12-05 PROCEDURE — 83605 ASSAY OF LACTIC ACID: CPT | Performed by: EMERGENCY MEDICINE

## 2023-12-05 PROCEDURE — U0002 COVID-19 LAB TEST NON-CDC: HCPCS | Performed by: EMERGENCY MEDICINE

## 2023-12-05 PROCEDURE — 83880 ASSAY OF NATRIURETIC PEPTIDE: CPT | Performed by: EMERGENCY MEDICINE

## 2023-12-05 PROCEDURE — 99285 EMERGENCY DEPT VISIT HI MDM: CPT | Mod: 25

## 2023-12-05 PROCEDURE — 63600175 PHARM REV CODE 636 W HCPCS: Performed by: EMERGENCY MEDICINE

## 2023-12-05 PROCEDURE — 25000242 PHARM REV CODE 250 ALT 637 W/ HCPCS: Performed by: EMERGENCY MEDICINE

## 2023-12-05 PROCEDURE — 94761 N-INVAS EAR/PLS OXIMETRY MLT: CPT | Mod: XB

## 2023-12-05 PROCEDURE — 85610 PROTHROMBIN TIME: CPT | Performed by: EMERGENCY MEDICINE

## 2023-12-05 PROCEDURE — 84295 ASSAY OF SERUM SODIUM: CPT

## 2023-12-05 PROCEDURE — 36600 WITHDRAWAL OF ARTERIAL BLOOD: CPT

## 2023-12-05 PROCEDURE — 36415 COLL VENOUS BLD VENIPUNCTURE: CPT | Performed by: EMERGENCY MEDICINE

## 2023-12-05 PROCEDURE — 27000221 HC OXYGEN, UP TO 24 HOURS

## 2023-12-05 PROCEDURE — 87040 BLOOD CULTURE FOR BACTERIA: CPT | Mod: 59 | Performed by: EMERGENCY MEDICINE

## 2023-12-05 PROCEDURE — 94640 AIRWAY INHALATION TREATMENT: CPT

## 2023-12-05 PROCEDURE — 99900031 HC PATIENT EDUCATION (STAT)

## 2023-12-05 PROCEDURE — 87502 INFLUENZA DNA AMP PROBE: CPT | Performed by: EMERGENCY MEDICINE

## 2023-12-05 PROCEDURE — 82803 BLOOD GASES ANY COMBINATION: CPT

## 2023-12-05 PROCEDURE — 87077 CULTURE AEROBIC IDENTIFY: CPT | Mod: 59 | Performed by: EMERGENCY MEDICINE

## 2023-12-05 PROCEDURE — 93005 ELECTROCARDIOGRAM TRACING: CPT | Performed by: GENERAL PRACTICE

## 2023-12-05 PROCEDURE — 84132 ASSAY OF SERUM POTASSIUM: CPT

## 2023-12-05 PROCEDURE — 82330 ASSAY OF CALCIUM: CPT

## 2023-12-05 PROCEDURE — 93010 ELECTROCARDIOGRAM REPORT: CPT | Mod: ,,, | Performed by: GENERAL PRACTICE

## 2023-12-05 PROCEDURE — 81001 URINALYSIS AUTO W/SCOPE: CPT | Performed by: EMERGENCY MEDICINE

## 2023-12-05 PROCEDURE — 84484 ASSAY OF TROPONIN QUANT: CPT | Performed by: EMERGENCY MEDICINE

## 2023-12-05 PROCEDURE — 96365 THER/PROPH/DIAG IV INF INIT: CPT

## 2023-12-05 PROCEDURE — 85027 COMPLETE CBC AUTOMATED: CPT | Performed by: EMERGENCY MEDICINE

## 2023-12-05 PROCEDURE — 96375 TX/PRO/DX INJ NEW DRUG ADDON: CPT

## 2023-12-05 PROCEDURE — 82962 GLUCOSE BLOOD TEST: CPT

## 2023-12-05 PROCEDURE — 87154 CUL TYP ID BLD PTHGN 6+ TRGT: CPT | Performed by: EMERGENCY MEDICINE

## 2023-12-05 PROCEDURE — 85730 THROMBOPLASTIN TIME PARTIAL: CPT | Performed by: EMERGENCY MEDICINE

## 2023-12-05 PROCEDURE — 99900035 HC TECH TIME PER 15 MIN (STAT)

## 2023-12-05 PROCEDURE — 85014 HEMATOCRIT: CPT

## 2023-12-05 PROCEDURE — 12000002 HC ACUTE/MED SURGE SEMI-PRIVATE ROOM

## 2023-12-05 PROCEDURE — 84145 PROCALCITONIN (PCT): CPT | Performed by: EMERGENCY MEDICINE

## 2023-12-05 PROCEDURE — 83036 HEMOGLOBIN GLYCOSYLATED A1C: CPT | Performed by: EMERGENCY MEDICINE

## 2023-12-05 RX ORDER — ACETAMINOPHEN 650 MG/20.3ML
650 LIQUID ORAL EVERY 6 HOURS PRN
Status: DISCONTINUED | OUTPATIENT
Start: 2023-12-05 | End: 2023-12-19 | Stop reason: HOSPADM

## 2023-12-05 RX ORDER — POLYETHYLENE GLYCOL 3350 17 G/17G
17 POWDER, FOR SOLUTION ORAL DAILY
Status: DISCONTINUED | OUTPATIENT
Start: 2023-12-06 | End: 2023-12-11

## 2023-12-05 RX ORDER — SODIUM,POTASSIUM PHOSPHATES 280-250MG
2 POWDER IN PACKET (EA) ORAL
Status: DISCONTINUED | OUTPATIENT
Start: 2023-12-06 | End: 2023-12-19 | Stop reason: HOSPADM

## 2023-12-05 RX ORDER — METOPROLOL TARTRATE 25 MG/1
25 TABLET, FILM COATED ORAL 2 TIMES DAILY
Status: DISCONTINUED | OUTPATIENT
Start: 2023-12-06 | End: 2023-12-19 | Stop reason: HOSPADM

## 2023-12-05 RX ORDER — LOSARTAN POTASSIUM 25 MG/1
100 TABLET ORAL DAILY
Status: DISCONTINUED | OUTPATIENT
Start: 2023-12-06 | End: 2023-12-06

## 2023-12-05 RX ORDER — LEVETIRACETAM 500 MG/1
500 TABLET ORAL 2 TIMES DAILY
Status: DISCONTINUED | OUTPATIENT
Start: 2023-12-05 | End: 2023-12-08 | Stop reason: SDUPTHER

## 2023-12-05 RX ORDER — ATORVASTATIN CALCIUM 40 MG/1
80 TABLET, FILM COATED ORAL NIGHTLY
Status: DISCONTINUED | OUTPATIENT
Start: 2023-12-06 | End: 2023-12-15

## 2023-12-05 RX ORDER — SODIUM CHLORIDE 0.9 % (FLUSH) 0.9 %
10 SYRINGE (ML) INJECTION
Status: DISCONTINUED | OUTPATIENT
Start: 2023-12-05 | End: 2023-12-19 | Stop reason: HOSPADM

## 2023-12-05 RX ORDER — IPRATROPIUM BROMIDE AND ALBUTEROL SULFATE 2.5; .5 MG/3ML; MG/3ML
3 SOLUTION RESPIRATORY (INHALATION)
Status: COMPLETED | OUTPATIENT
Start: 2023-12-05 | End: 2023-12-05

## 2023-12-05 RX ORDER — SODIUM CHLORIDE, SODIUM LACTATE, POTASSIUM CHLORIDE, CALCIUM CHLORIDE 600; 310; 30; 20 MG/100ML; MG/100ML; MG/100ML; MG/100ML
INJECTION, SOLUTION INTRAVENOUS CONTINUOUS
Status: DISCONTINUED | OUTPATIENT
Start: 2023-12-06 | End: 2023-12-12

## 2023-12-05 RX ORDER — INSULIN ASPART 100 [IU]/ML
0-10 INJECTION, SOLUTION INTRAVENOUS; SUBCUTANEOUS
Status: DISCONTINUED | OUTPATIENT
Start: 2023-12-06 | End: 2023-12-19 | Stop reason: HOSPADM

## 2023-12-05 RX ORDER — IPRATROPIUM BROMIDE 0.5 MG/2.5ML
0.5 SOLUTION RESPIRATORY (INHALATION) EVERY 6 HOURS
Status: DISCONTINUED | OUTPATIENT
Start: 2023-12-06 | End: 2023-12-13

## 2023-12-05 RX ORDER — FUROSEMIDE 40 MG/1
40 TABLET ORAL DAILY
COMMUNITY

## 2023-12-05 RX ORDER — IPRATROPIUM BROMIDE AND ALBUTEROL SULFATE 2.5; .5 MG/3ML; MG/3ML
3 SOLUTION RESPIRATORY (INHALATION) EVERY 4 HOURS PRN
Status: DISCONTINUED | OUTPATIENT
Start: 2023-12-06 | End: 2023-12-19 | Stop reason: HOSPADM

## 2023-12-05 RX ORDER — ACETAMINOPHEN 650 MG/1
650 SUPPOSITORY RECTAL
Status: COMPLETED | OUTPATIENT
Start: 2023-12-05 | End: 2023-12-05

## 2023-12-05 RX ORDER — MUPIROCIN 20 MG/G
OINTMENT TOPICAL 2 TIMES DAILY
Status: DISPENSED | OUTPATIENT
Start: 2023-12-06 | End: 2023-12-11

## 2023-12-05 RX ORDER — AMLODIPINE BESYLATE 5 MG/1
10 TABLET ORAL DAILY
Status: DISCONTINUED | OUTPATIENT
Start: 2023-12-06 | End: 2023-12-19 | Stop reason: HOSPADM

## 2023-12-05 RX ORDER — LANOLIN ALCOHOL/MO/W.PET/CERES
800 CREAM (GRAM) TOPICAL
Status: DISCONTINUED | OUTPATIENT
Start: 2023-12-06 | End: 2023-12-19 | Stop reason: HOSPADM

## 2023-12-05 RX ORDER — IBUPROFEN 200 MG
16 TABLET ORAL
Status: DISCONTINUED | OUTPATIENT
Start: 2023-12-06 | End: 2023-12-19 | Stop reason: HOSPADM

## 2023-12-05 RX ORDER — IBUPROFEN 200 MG
24 TABLET ORAL
Status: DISCONTINUED | OUTPATIENT
Start: 2023-12-06 | End: 2023-12-19 | Stop reason: HOSPADM

## 2023-12-05 RX ORDER — ONDANSETRON HYDROCHLORIDE 2 MG/ML
4 INJECTION, SOLUTION INTRAVENOUS EVERY 8 HOURS PRN
Status: DISCONTINUED | OUTPATIENT
Start: 2023-12-05 | End: 2023-12-19 | Stop reason: HOSPADM

## 2023-12-05 RX ORDER — GLUCAGON 1 MG
1 KIT INJECTION
Status: DISCONTINUED | OUTPATIENT
Start: 2023-12-06 | End: 2023-12-19 | Stop reason: HOSPADM

## 2023-12-05 RX ORDER — LEVETIRACETAM 500 MG/1
500 TABLET ORAL 2 TIMES DAILY
Status: DISCONTINUED | OUTPATIENT
Start: 2023-12-06 | End: 2023-12-05

## 2023-12-05 RX ORDER — NAPROXEN SODIUM 220 MG/1
81 TABLET, FILM COATED ORAL DAILY
Status: DISCONTINUED | OUTPATIENT
Start: 2023-12-06 | End: 2023-12-19 | Stop reason: HOSPADM

## 2023-12-05 RX ORDER — ONDANSETRON HYDROCHLORIDE 2 MG/ML
4 INJECTION, SOLUTION INTRAVENOUS
Status: COMPLETED | OUTPATIENT
Start: 2023-12-05 | End: 2023-12-05

## 2023-12-05 RX ADMIN — ACETAMINOPHEN 650 MG: 650 SUPPOSITORY RECTAL at 08:12

## 2023-12-05 RX ADMIN — IPRATROPIUM BROMIDE AND ALBUTEROL SULFATE 3 ML: 2.5; .5 SOLUTION RESPIRATORY (INHALATION) at 08:12

## 2023-12-05 RX ADMIN — SODIUM CHLORIDE, SODIUM LACTATE, POTASSIUM CHLORIDE, AND CALCIUM CHLORIDE 1686 ML: .6; .31; .03; .02 INJECTION, SOLUTION INTRAVENOUS at 10:12

## 2023-12-05 RX ADMIN — ONDANSETRON 4 MG: 2 INJECTION INTRAMUSCULAR; INTRAVENOUS at 10:12

## 2023-12-05 RX ADMIN — PIPERACILLIN SODIUM,TAZOBACTAM SODIUM 3.38 G: 3; .375 INJECTION, POWDER, FOR SOLUTION INTRAVENOUS at 10:12

## 2023-12-06 PROBLEM — D72.829 LEUCOCYTOSIS: Status: ACTIVE | Noted: 2023-12-06

## 2023-12-06 LAB
ACINETOBACTER CALCOACETICUS/BAUMANNII COMPLEX: NOT DETECTED
ADENOVIRUS: NOT DETECTED
ANION GAP SERPL CALC-SCNC: 8 MMOL/L (ref 8–16)
ANISOCYTOSIS BLD QL SMEAR: ABNORMAL
ANISOCYTOSIS BLD QL SMEAR: SLIGHT
BACTEROIDES FRAGILIS: NOT DETECTED
BASOPHILS NFR BLD: 0 % (ref 0–1.9)
BASOPHILS NFR BLD: 0 % (ref 0–1.9)
BORDETELLA PARAPERTUSSIS (IS1001): NOT DETECTED
BORDETELLA PERTUSSIS (PTXP): NOT DETECTED
BUN SERPL-MCNC: 61 MG/DL (ref 8–23)
CALCIUM SERPL-MCNC: 8.4 MG/DL (ref 8.7–10.5)
CANDIDA ALBICANS: NOT DETECTED
CANDIDA AURIS: NOT DETECTED
CANDIDA GLABRATA: NOT DETECTED
CANDIDA KRUSEI: NOT DETECTED
CANDIDA PARAPSILOSIS: NOT DETECTED
CANDIDA TROPICALIS: NOT DETECTED
CHLAMYDIA PNEUMONIAE: NOT DETECTED
CHLORIDE SERPL-SCNC: 99 MMOL/L (ref 95–110)
CO2 SERPL-SCNC: 28 MMOL/L (ref 23–29)
CORONAVIRUS 229E, COMMON COLD VIRUS: NOT DETECTED
CORONAVIRUS HKU1, COMMON COLD VIRUS: NOT DETECTED
CORONAVIRUS NL63, COMMON COLD VIRUS: NOT DETECTED
CORONAVIRUS OC43, COMMON COLD VIRUS: NOT DETECTED
CREAT SERPL-MCNC: 1.5 MG/DL (ref 0.5–1.4)
CRP SERPL-MCNC: 293 MG/L (ref 0–8.2)
CRYPTOCOCCUS NEOFORMANS/GATTII: NOT DETECTED
CTX-M GENE (ESBL PRODUCER): DETECTED
DIFFERENTIAL METHOD BLD: ABNORMAL
DIFFERENTIAL METHOD BLD: ABNORMAL
ENTEROBACTER CLOACAE COMPLEX: NOT DETECTED
ENTEROBACTERALES: ABNORMAL
ENTEROCOCCUS FAECALIS: NOT DETECTED
ENTEROCOCCUS FAECIUM: NOT DETECTED
EOSINOPHIL NFR BLD: 0 % (ref 0–8)
EOSINOPHIL NFR BLD: 0 % (ref 0–8)
ERYTHROCYTE [DISTWIDTH] IN BLOOD BY AUTOMATED COUNT: 21.4 % (ref 11.5–14.5)
ERYTHROCYTE [DISTWIDTH] IN BLOOD BY AUTOMATED COUNT: 21.8 % (ref 11.5–14.5)
ESCHERICHIA COLI: NOT DETECTED
EST. GFR  (NO RACE VARIABLE): 38.4 ML/MIN/1.73 M^2
ESTIMATED AVG GLUCOSE: 177 MG/DL (ref 68–131)
FLUBV RNA NPH QL NAA+NON-PROBE: NOT DETECTED
GLUCOSE SERPL-MCNC: 131 MG/DL (ref 70–110)
GLUCOSE SERPL-MCNC: 144 MG/DL (ref 70–110)
GLUCOSE SERPL-MCNC: 188 MG/DL (ref 70–110)
GLUCOSE SERPL-MCNC: 216 MG/DL (ref 70–110)
GLUCOSE SERPL-MCNC: 216 MG/DL (ref 70–110)
HAEMOPHILUS INFLUENZAE: NOT DETECTED
HBA1C MFR BLD: 7.8 % (ref 4.5–6.2)
HCT VFR BLD AUTO: 23.4 % (ref 37–48.5)
HCT VFR BLD AUTO: 28.7 % (ref 37–48.5)
HGB BLD-MCNC: 7.9 G/DL (ref 12–16)
HGB BLD-MCNC: 9.6 G/DL (ref 12–16)
HPIV1 RNA NPH QL NAA+NON-PROBE: NOT DETECTED
HPIV2 RNA NPH QL NAA+NON-PROBE: NOT DETECTED
HPIV3 RNA NPH QL NAA+NON-PROBE: NOT DETECTED
HPIV4 RNA NPH QL NAA+NON-PROBE: NOT DETECTED
HUMAN METAPNEUMOVIRUS: NOT DETECTED
IMM GRANULOCYTES # BLD AUTO: ABNORMAL K/UL (ref 0–0.04)
IMM GRANULOCYTES # BLD AUTO: ABNORMAL K/UL (ref 0–0.04)
IMM GRANULOCYTES NFR BLD AUTO: ABNORMAL % (ref 0–0.5)
IMM GRANULOCYTES NFR BLD AUTO: ABNORMAL % (ref 0–0.5)
IMP GENE (CARBAPENEM RESISTANT): NOT DETECTED
INFLUENZA A (SUBTYPES H1,H1-2009,H3): NOT DETECTED
KLEBSIELLA AEROGENES: NOT DETECTED
KLEBSIELLA OXYTOCA: NOT DETECTED
KLEBSIELLA PNEUMONIAE GROUP: NOT DETECTED
KPC RESISTANCE GENE (CARBAPENEM): NOT DETECTED
LACTATE SERPL-SCNC: 1.2 MMOL/L (ref 0.5–1.9)
LACTATE SERPL-SCNC: 3 MMOL/L (ref 0.5–1.9)
LACTATE SERPL-SCNC: 3.3 MMOL/L (ref 0.5–1.9)
LISTERIA MONOCYTOGENES: NOT DETECTED
LYMPHOCYTES NFR BLD: 5 % (ref 18–48)
LYMPHOCYTES NFR BLD: 5 % (ref 18–48)
MAGNESIUM SERPL-MCNC: 2.2 MG/DL (ref 1.6–2.6)
MCH RBC QN AUTO: 25.6 PG (ref 27–31)
MCH RBC QN AUTO: 25.7 PG (ref 27–31)
MCHC RBC AUTO-ENTMCNC: 33.4 G/DL (ref 32–36)
MCHC RBC AUTO-ENTMCNC: 33.8 G/DL (ref 32–36)
MCR-1: ABNORMAL
MCV RBC AUTO: 76 FL (ref 82–98)
MCV RBC AUTO: 77 FL (ref 82–98)
MEC A/C AND MREJ (MRSA): ABNORMAL
MEC A/C: ABNORMAL
METAMYELOCYTES NFR BLD MANUAL: 2 %
METAMYELOCYTES NFR BLD MANUAL: 3 %
MONOCYTES NFR BLD: 5 % (ref 4–15)
MONOCYTES NFR BLD: 5 % (ref 4–15)
MRSA SCREEN BY PCR: NEGATIVE
MYCOPLASMA PNEUMONIAE: NOT DETECTED
MYELOCYTES NFR BLD MANUAL: 1 %
NDM GENE (CARBAPENEM RESISTANT): NOT DETECTED
NEISSERIA MENINGITIDIS: NOT DETECTED
NEUTROPHILS NFR BLD: 49 % (ref 38–73)
NEUTROPHILS NFR BLD: 69 % (ref 38–73)
NEUTS BAND NFR BLD MANUAL: 19 %
NEUTS BAND NFR BLD MANUAL: 37 %
NRBC BLD-RTO: 0 /100 WBC
NRBC BLD-RTO: 0 /100 WBC
OXA-48-LIKE (CARBAPENEM RESISTANT): NOT DETECTED
PLATELET # BLD AUTO: 492 K/UL (ref 150–450)
PLATELET # BLD AUTO: 577 K/UL (ref 150–450)
PLATELET BLD QL SMEAR: ABNORMAL
PLATELET BLD QL SMEAR: ABNORMAL
PMV BLD AUTO: 10.4 FL (ref 9.2–12.9)
PMV BLD AUTO: 10.7 FL (ref 9.2–12.9)
POIKILOCYTOSIS BLD QL SMEAR: SLIGHT
POIKILOCYTOSIS BLD QL SMEAR: SLIGHT
POLYCHROMASIA BLD QL SMEAR: ABNORMAL
POTASSIUM SERPL-SCNC: 4.4 MMOL/L (ref 3.5–5.1)
PROTEUS SPECIES: DETECTED
PSEUDOMONAS AERUGINOSA: NOT DETECTED
RBC # BLD AUTO: 3.08 M/UL (ref 4–5.4)
RBC # BLD AUTO: 3.74 M/UL (ref 4–5.4)
RESPIRATORY INFECTION PANEL SOURCE: NORMAL
RSV RNA NPH QL NAA+NON-PROBE: NOT DETECTED
RV+EV RNA NPH QL NAA+NON-PROBE: NOT DETECTED
SALMONELLA SP: NOT DETECTED
SARS-COV-2 RNA RESP QL NAA+PROBE: NOT DETECTED
SERRATIA MARCESCENS: NOT DETECTED
SODIUM SERPL-SCNC: 135 MMOL/L (ref 136–145)
STAPHYLOCOCCUS AUREUS: NOT DETECTED
STAPHYLOCOCCUS EPIDERMIDIS: NOT DETECTED
STAPHYLOCOCCUS LUGDUNESIS: NOT DETECTED
STAPHYLOCOCCUS SPECIES: NOT DETECTED
STENOTROPHOMONAS MALTOPHILIA: NOT DETECTED
STREPTOCOCCUS AGALACTIAE: NOT DETECTED
STREPTOCOCCUS PNEUMONIAE: NOT DETECTED
STREPTOCOCCUS PYOGENES: NOT DETECTED
STREPTOCOCCUS SPECIES: NOT DETECTED
VAN A/B (VRE GENE): ABNORMAL
VIM GENE (CARBAPENEM RESISTANT): NOT DETECTED
WBC # BLD AUTO: 39.98 K/UL (ref 3.9–12.7)
WBC # BLD AUTO: 40.96 K/UL (ref 3.9–12.7)

## 2023-12-06 PROCEDURE — 27000221 HC OXYGEN, UP TO 24 HOURS

## 2023-12-06 PROCEDURE — 94640 AIRWAY INHALATION TREATMENT: CPT

## 2023-12-06 PROCEDURE — 99900035 HC TECH TIME PER 15 MIN (STAT)

## 2023-12-06 PROCEDURE — 99900031 HC PATIENT EDUCATION (STAT)

## 2023-12-06 PROCEDURE — 25000003 PHARM REV CODE 250: Performed by: STUDENT IN AN ORGANIZED HEALTH CARE EDUCATION/TRAINING PROGRAM

## 2023-12-06 PROCEDURE — 63600175 PHARM REV CODE 636 W HCPCS: Performed by: INTERNAL MEDICINE

## 2023-12-06 PROCEDURE — 87641 MR-STAPH DNA AMP PROBE: CPT | Performed by: NURSE PRACTITIONER

## 2023-12-06 PROCEDURE — 36415 COLL VENOUS BLD VENIPUNCTURE: CPT | Performed by: STUDENT IN AN ORGANIZED HEALTH CARE EDUCATION/TRAINING PROGRAM

## 2023-12-06 PROCEDURE — 94761 N-INVAS EAR/PLS OXIMETRY MLT: CPT

## 2023-12-06 PROCEDURE — 87040 BLOOD CULTURE FOR BACTERIA: CPT | Mod: 59 | Performed by: STUDENT IN AN ORGANIZED HEALTH CARE EDUCATION/TRAINING PROGRAM

## 2023-12-06 PROCEDURE — 82962 GLUCOSE BLOOD TEST: CPT

## 2023-12-06 PROCEDURE — 25000242 PHARM REV CODE 250 ALT 637 W/ HCPCS: Performed by: STUDENT IN AN ORGANIZED HEALTH CARE EDUCATION/TRAINING PROGRAM

## 2023-12-06 PROCEDURE — 63600175 PHARM REV CODE 636 W HCPCS: Performed by: STUDENT IN AN ORGANIZED HEALTH CARE EDUCATION/TRAINING PROGRAM

## 2023-12-06 PROCEDURE — 86140 C-REACTIVE PROTEIN: CPT | Performed by: STUDENT IN AN ORGANIZED HEALTH CARE EDUCATION/TRAINING PROGRAM

## 2023-12-06 PROCEDURE — 83605 ASSAY OF LACTIC ACID: CPT | Mod: 91 | Performed by: STUDENT IN AN ORGANIZED HEALTH CARE EDUCATION/TRAINING PROGRAM

## 2023-12-06 PROCEDURE — 80048 BASIC METABOLIC PNL TOTAL CA: CPT | Performed by: STUDENT IN AN ORGANIZED HEALTH CARE EDUCATION/TRAINING PROGRAM

## 2023-12-06 PROCEDURE — 99223 1ST HOSP IP/OBS HIGH 75: CPT | Mod: FS,,, | Performed by: NURSE PRACTITIONER

## 2023-12-06 PROCEDURE — 83735 ASSAY OF MAGNESIUM: CPT | Performed by: STUDENT IN AN ORGANIZED HEALTH CARE EDUCATION/TRAINING PROGRAM

## 2023-12-06 PROCEDURE — 85027 COMPLETE CBC AUTOMATED: CPT | Performed by: STUDENT IN AN ORGANIZED HEALTH CARE EDUCATION/TRAINING PROGRAM

## 2023-12-06 PROCEDURE — 85007 BL SMEAR W/DIFF WBC COUNT: CPT | Performed by: STUDENT IN AN ORGANIZED HEALTH CARE EDUCATION/TRAINING PROGRAM

## 2023-12-06 PROCEDURE — 87798 DETECT AGENT NOS DNA AMP: CPT | Performed by: NURSE PRACTITIONER

## 2023-12-06 PROCEDURE — 99221 1ST HOSP IP/OBS SF/LOW 40: CPT | Mod: ,,, | Performed by: FAMILY MEDICINE

## 2023-12-06 PROCEDURE — 25000003 PHARM REV CODE 250: Performed by: INTERNAL MEDICINE

## 2023-12-06 PROCEDURE — 12000002 HC ACUTE/MED SURGE SEMI-PRIVATE ROOM

## 2023-12-06 RX ORDER — FAMOTIDINE 20 MG/1
20 TABLET, FILM COATED ORAL DAILY
Status: DISCONTINUED | OUTPATIENT
Start: 2023-12-06 | End: 2023-12-19 | Stop reason: HOSPADM

## 2023-12-06 RX ADMIN — MUPIROCIN 1 G: 20 OINTMENT TOPICAL at 09:12

## 2023-12-06 RX ADMIN — MUPIROCIN 1 G: 20 OINTMENT TOPICAL at 08:12

## 2023-12-06 RX ADMIN — IPRATROPIUM BROMIDE AND ALBUTEROL SULFATE 3 ML: 2.5; .5 SOLUTION RESPIRATORY (INHALATION) at 12:12

## 2023-12-06 RX ADMIN — MEROPENEM 1 G: 1 INJECTION, POWDER, FOR SOLUTION INTRAVENOUS at 03:12

## 2023-12-06 RX ADMIN — PIPERACILLIN SODIUM AND TAZOBACTAM SODIUM 3.38 G: 3; .375 INJECTION, POWDER, LYOPHILIZED, FOR SOLUTION INTRAVENOUS at 05:12

## 2023-12-06 RX ADMIN — SODIUM CHLORIDE, SODIUM LACTATE, POTASSIUM CHLORIDE, AND CALCIUM CHLORIDE: .6; .31; .03; .02 INJECTION, SOLUTION INTRAVENOUS at 12:12

## 2023-12-06 RX ADMIN — IPRATROPIUM BROMIDE 0.5 MG: 0.5 SOLUTION RESPIRATORY (INHALATION) at 12:12

## 2023-12-06 RX ADMIN — APIXABAN 2.5 MG: 2.5 TABLET, FILM COATED ORAL at 08:12

## 2023-12-06 RX ADMIN — LEVETIRACETAM 500 MG: 500 TABLET, FILM COATED ORAL at 08:12

## 2023-12-06 RX ADMIN — POLYETHYLENE GLYCOL 3350 17 G: 17 POWDER, FOR SOLUTION ORAL at 09:12

## 2023-12-06 RX ADMIN — ATORVASTATIN CALCIUM 80 MG: 40 TABLET, FILM COATED ORAL at 08:12

## 2023-12-06 RX ADMIN — IPRATROPIUM BROMIDE 0.5 MG: 0.5 SOLUTION RESPIRATORY (INHALATION) at 08:12

## 2023-12-06 RX ADMIN — SODIUM CHLORIDE, SODIUM LACTATE, POTASSIUM CHLORIDE, AND CALCIUM CHLORIDE: .6; .31; .03; .02 INJECTION, SOLUTION INTRAVENOUS at 08:12

## 2023-12-06 RX ADMIN — LEVETIRACETAM 500 MG: 500 TABLET, FILM COATED ORAL at 12:12

## 2023-12-06 RX ADMIN — LEVETIRACETAM 500 MG: 500 TABLET, FILM COATED ORAL at 09:12

## 2023-12-06 RX ADMIN — METOPROLOL TARTRATE 25 MG: 25 TABLET, FILM COATED ORAL at 08:12

## 2023-12-06 RX ADMIN — VANCOMYCIN HYDROCHLORIDE 1500 MG: 1.5 INJECTION, POWDER, LYOPHILIZED, FOR SOLUTION INTRAVENOUS at 02:12

## 2023-12-06 RX ADMIN — ASPIRIN 81 MG 81 MG: 81 TABLET ORAL at 09:12

## 2023-12-06 RX ADMIN — FAMOTIDINE 20 MG: 20 TABLET ORAL at 05:12

## 2023-12-06 RX ADMIN — METOPROLOL TARTRATE 25 MG: 25 TABLET, FILM COATED ORAL at 09:12

## 2023-12-06 RX ADMIN — APIXABAN 2.5 MG: 2.5 TABLET, FILM COATED ORAL at 09:12

## 2023-12-06 RX ADMIN — AMLODIPINE BESYLATE 10 MG: 5 TABLET ORAL at 09:12

## 2023-12-06 RX ADMIN — LOSARTAN POTASSIUM 100 MG: 25 TABLET, FILM COATED ORAL at 09:12

## 2023-12-06 RX ADMIN — IPRATROPIUM BROMIDE 0.5 MG: 0.5 SOLUTION RESPIRATORY (INHALATION) at 07:12

## 2023-12-06 NOTE — SUBJECTIVE & OBJECTIVE
Past Medical History:   Diagnosis Date    Arthritis     Complete tear of left rotator cuff 11/09/2017    COPD (chronic obstructive pulmonary disease)     COVID-19 infection 07/02/2021    Diabetes mellitus     H/o Cerebrovascular accident (CVA) of left pontine structure 12/12/2019    Hypertension     Personal history of colonic polyps     Stroke     Wears glasses        Past Surgical History:   Procedure Laterality Date    COLONOSCOPY N/A 4/11/2017    Procedure: COLONOSCOPY;  Surgeon: Jared Antonio MD;  Location: [facility];  Service: Endoscopy;  Laterality: N/A;    HYSTERECTOMY      OOPHORECTOMY      SHOULDER SURGERY      R    TRANSESOPHAGEAL ECHOCARDIOGRAM WITH POSSIBLE CARDIOVERSION (GT W/ POSS CARDIOVERSION) N/A 5/4/2023    Procedure: Transesophageal echo (GT) intra-procedure log documentation;  Surgeon: Blade Phillip MD;  Location: [facility] CATH/EP LAB;  Service: Cardiology;  Laterality: N/A;       Review of patient's allergies indicates:  No Known Allergies    No current facility-administered medications on file prior to encounter.     Current Outpatient Medications on File Prior to Encounter   Medication Sig    amLODIPine (NORVASC) 10 MG tablet Take 1 tablet (10 mg total) by mouth once daily. (Patient taking differently: 10 mg by Per G Tube route once daily.)    apixaban (ELIQUIS) 2.5 mg Tab 2.5 mg by Per G Tube route 2 (two) times daily.    aspirin 81 MG Chew Take 1 tablet (81 mg total) by mouth once daily. (Patient taking differently: 81 mg by Per G Tube route once daily.)    atorvastatin (LIPITOR) 80 MG tablet Take 1 tablet (80 mg total) by mouth every evening. (Patient taking differently: 80 mg by Per G Tube route every evening.)    folic acid (FOLVITE) 1 MG tablet 1 tablet by Per G Tube route every morning.    furosemide (LASIX) 40 MG tablet 40 mg by Per G Tube route once daily.    insulin (LANTUS SOLOSTAR U-100 INSULIN) glargine 100 units/mL SubQ pen Inject 35 Units into the skin once daily.     insulin aspart U-100 (NOVOLOG) 100 unit/mL injection Inject 4 Units into the skin As instructed for High Blood Sugar (Four times daily per sliding scale). 0 -150 = 0 units  151 - 200 = 4 units  201 - 250 = 8 units  251 - 300 = 10 units  301 - 350 = 12 units  351 - 400 = 16 units  401 - 1000 = 20 units call MD    latanoprost 0.005 % ophthalmic solution Place 1 drop into both eyes once daily. (Patient taking differently: Place 1 drop into both eyes every evening.)    levETIRAcetam (KEPPRA) 100 mg/mL Soln 500 mg by Per G Tube route 2 (two) times daily.    losartan (COZAAR) 100 MG tablet Take 1 tablet (100 mg total) by mouth once daily. (Patient taking differently: 100 mg by Per G Tube route once daily.)    magnesium oxide (MAG-OX) 400 mg (241.3 mg magnesium) tablet 400 mg by Per G Tube route once daily.    metFORMIN (GLUCOPHAGE) 500 MG tablet 1,000 mg by Per G Tube route 2 (two) times daily with meals.    metoprolol tartrate (LOPRESSOR) 25 MG tablet 25 mg by Per G Tube route 2 (two) times daily.    multivitamin (THERAGRAN) per tablet 1 tablet by Per G Tube route once daily.    polyethylene glycol (GLYCOLAX) 17 gram PwPk 17 g by Per G Tube route once daily.    thiamine (VITAMIN B-1) 100 MG tablet 100 mg by Per G Tube route once daily.    baclofen (LIORESAL) 5 mg Tab tablet 5 mg by Per G Tube route every 8 (eight) hours as needed (muscle spasms).    cloNIDine (CATAPRES) 0.1 MG tablet Take 1 tablet (0.1 mg total) by mouth 3 (three) times daily. (Patient taking differently: Take 0.1 mg by mouth 4 (four) times daily as needed (For systolic over 160).)    metoprolol succinate (TOPROL-XL) 50 MG 24 hr tablet Take 1 tablet (50 mg total) by mouth once daily.    minoxidiL (LONITEN) 10 MG Tab Take 10 mg by mouth 2 (two) times daily.    tiotropium (SPIRIVA) 18 mcg inhalation capsule Inhale 1 capsule (18 mcg total) into the lungs once daily. Controller    [DISCONTINUED] blood-glucose meter (TRUE METRIX GLUCOSE METER) kit Use as  instructed    [DISCONTINUED] lancing device (TRUEDRAW LANCING DEVICE) Misc Inject 1 Device into the skin 2 (two) times daily.     Family History       Problem Relation (Age of Onset)    Anemia Daughter    Asthma Mother    Diabetes Mother, Father, Brother    Hypertension Mother, Brother          Tobacco Use    Smoking status: Never    Smokeless tobacco: Never   Substance and Sexual Activity    Alcohol use: No    Drug use: No    Sexual activity: Not Currently     Review of Systems   Unable to perform ROS: Patient nonverbal     Objective:     Vital Signs (Most Recent):  Temp: (!) 100.4 °F (38 °C) (12/05/23 2015)  Pulse: (!) 112 (12/05/23 2028)  Resp: (!) 27 (12/05/23 2028)  BP: 138/69 (12/05/23 2028)  SpO2: 100 % (12/05/23 2028) Vital Signs (24h Range):  Temp:  [100.4 °F (38 °C)] 100.4 °F (38 °C)  Pulse:  [111-112] 112  Resp:  [27-30] 27  SpO2:  [89 %-100 %] 100 %  BP: (122-138)/(63-69) 138/69     Weight: 56.2 kg (123 lb 14.4 oz)  Body mass index is 25.02 kg/m².     Physical Exam  Vitals reviewed.   Constitutional:       Appearance: She is ill-appearing and toxic-appearing.   HENT:      Head: Normocephalic and atraumatic.      Right Ear: External ear normal.      Left Ear: External ear normal.      Nose: Nose normal.      Mouth/Throat:      Mouth: Mucous membranes are dry.   Cardiovascular:      Rate and Rhythm: Regular rhythm. Tachycardia present.   Pulmonary:      Effort: No respiratory distress.      Breath sounds: No wheezing.   Abdominal:      General: There is no distension.      Palpations: Abdomen is soft.   Musculoskeletal:      Comments: Lower extremities rigid, decreased range of motion   Skin:     General: Skin is dry.      Comments: Sacral wound   Neurological:      Mental Status: Mental status is at baseline.                Significant Labs: All pertinent labs within the past 24 hours have been reviewed.  CBC:   Recent Labs   Lab 12/05/23 2028 12/05/23 2134   WBC  --  40.96*   HGB  --  9.6*   HCT 27*  28.7*   PLT  --  577*     CMP:   Recent Labs   Lab 12/05/23  2134   *   K 5.2*   CL 94*   CO2 28   *   BUN 67*   CREATININE 1.7*   CALCIUM 9.6   PROT 7.4   ALBUMIN 3.3*   BILITOT 0.5   ALKPHOS 98   AST 35   ALT 30   ANIONGAP 13       Significant Imaging: I have reviewed all pertinent imaging results/findings within the past 24 hours.

## 2023-12-06 NOTE — SUBJECTIVE & OBJECTIVE
Interval History:         Review of Systems   Unable to perform ROS: Patient nonverbal     Objective:     Vital Signs (Most Recent):  Temp: (!) 100.4 °F (38 °C) (12/05/23 2015)  Pulse: 79 (12/06/23 1202)  Resp: 20 (12/06/23 1202)  BP: 139/66 (12/06/23 1202)  SpO2: 100 % (12/06/23 1202) Vital Signs (24h Range):  Temp:  [100.4 °F (38 °C)] 100.4 °F (38 °C)  Pulse:  [] 79  Resp:  [15-30] 20  SpO2:  [89 %-100 %] 100 %  BP: (101-157)/(55-77) 139/66     Weight: 56.2 kg (123 lb 14.4 oz)  Body mass index is 25.02 kg/m².    Intake/Output Summary (Last 24 hours) at 12/6/2023 1327  Last data filed at 12/6/2023 0012  Gross per 24 hour   Intake 1786 ml   Output --   Net 1786 ml         Physical Exam  Vitals and nursing note reviewed.   Constitutional:       General: She is not in acute distress.     Appearance: Normal appearance.   HENT:      Head: Atraumatic.      Right Ear: External ear normal.      Left Ear: External ear normal.      Nose: Nose normal.      Mouth/Throat:      Mouth: Mucous membranes are dry.   Eyes:      Extraocular Movements: Extraocular movements intact.   Cardiovascular:      Rate and Rhythm: Normal rate.   Pulmonary:      Effort: Pulmonary effort is normal.   Abdominal:      Palpations: Abdomen is soft.   Musculoskeletal:      Right lower leg: No edema.      Left lower leg: No edema.   Skin:     General: Skin is warm.   Neurological:      Mental Status: Mental status is at baseline.             Significant Labs: All pertinent labs within the past 24 hours have been reviewed.  CBC:   Recent Labs   Lab 12/05/23 2028 12/05/23 2134 12/06/23 0526   WBC  --  40.96* 39.98*   HGB  --  9.6* 7.9*   HCT 27* 28.7* 23.4*   PLT  --  577* 492*     CMP:   Recent Labs   Lab 12/05/23 2134 12/06/23 0526   * 135*   K 5.2* 4.4   CL 94* 99   CO2 28 28   * 216*   BUN 67* 61*   CREATININE 1.7* 1.5*   CALCIUM 9.6 8.4*   PROT 7.4  --    ALBUMIN 3.3*  --    BILITOT 0.5  --    ALKPHOS 98  --    AST 35  --     ALT 30  --    ANIONGAP 13 8       Significant Imaging: I have reviewed all pertinent imaging results/findings within the past 24 hours.

## 2023-12-06 NOTE — ASSESSMENT & PLAN NOTE
History of CVA, nonverbal and bed-bound at baseline  Has PEG tube, nutrition consulted for tube feedings.    Has chronic indwelling catheter, replaced in the emergency room.

## 2023-12-06 NOTE — PLAN OF CARE
[facility] - Emergency Dept  Initial Discharge Assessment       Primary Care Provider: Cristina Ren MD    Admission Diagnosis: Urinary tract infection associated with indwelling urethral catheter, initial encounter [T83.511A, N39.0]    Admission Date: 12/5/2023  Expected Discharge Date:     Transition of Care Barriers: None    Initial assessment attempted at bedside but pt was unable to participate. CM called emergency contact Leonie but no answer of available voicemail noted. CM contacted Nick at [facility]. Pt is currently at facility for SNF. [place] will reaccept pt when discharged.    Payor: MEDICARE / Plan: MEDICARE PART A & B / Product Type: Government /     Extended Emergency Contact Information  Primary Emergency Contact: Leonie Johnson  Mobile Phone: [phone]  Relation: Daughter  Preferred language: English   needed? No  Secondary Emergency Contact: Nish Marti  [place] Phone: [phone]  Mobile Phone: [phone]  Relation: Friend   needed? No    Discharge Plan A: Return to nursing home, Skilled Nursing Facility  Discharge Plan B: Return to Nursing Home, Skilled Nursing Facility      [facility] - [facility] [address]  [address] [address]  Phone: [phone] Fax: [phone]    [facility] [address] [address]  Phone: [phone] Fax: [phone]    [id number] - [facility] [address]  AT [facility]  [address] [address]  Phone: [phone] Fax: [phone]      Initial Assessment (most recent)       Adult Discharge Assessment - 12/06/23 1305          Discharge Assessment    Assessment Type Discharge Planning Assessment     Confirmed/corrected address, phone number and insurance Yes     Confirmed Demographics Correct on Facesheet     Source of Information patient     Reason For Admission Severe  sepsis     People in Home facility resident     [facility]     Do you expect to return to your current living situation? Yes     Do you have help at home or someone to help you manage your care at home? Yes     Who are your caregiver(s) and their phone number(s)? facility     Walking or Climbing Stairs ambulation difficulty, dependent     Mobility Management wheelchair and hospital bed     Dressing/Bathing bathing difficulty, dependent     Dressing/Bathing Management Willmar Anshu     Do you have any problems with: Needs other help     Specify other help Willmar Anshu     Home Accessibility wheelchair accessible     Home Layout Able to live on 1st floor     Equipment Currently Used at Home wheelchair     Readmission within 30 days? No     Patient currently being followed by outpatient case management? Yes     If yes, name of outpatient case management following: other (comments)     Do you currently have service(s) that help you manage your care at home? No     Do you take prescription medications? Yes     Do you have prescription coverage? Yes     Coverage Medicaid     Do you have any problems affording any of your prescribed medications? No     Is the patient taking medications as prescribed? yes     Who is going to help you get home at discharge? Tristen Brasher     How do you get to doctors appointments? other (see comments)     Are you on dialysis? No     Do you take coumadin? No     DME Needed Upon Discharge  none     Discharge Plan discussed with: Caregiver     Name(s) and Number(s) Nick at [facility]     Transition of Care Barriers None     Discharge Plan A Return to nursing home;Skilled Nursing Facility     Discharge Plan B Return to Nursing Home;Skilled Nursing Facility        Physical Activity    On average, how many days per week do you engage in moderate to strenuous exercise (like a brisk walk)? 5 days     On average, how many minutes do you engage in exercise at  this level? 150+ min        Financial Resource Strain    How hard is it for you to pay for the very basics like food, housing, medical care, and heating? Not hard at all        Housing Stability    In the last 12 months, was there a time when you were not able to pay the mortgage or rent on time? No     In the last 12 months, how many places have you lived? 2     In the last 12 months, was there a time when you did not have a steady place to sleep or slept in a shelter (including now)? No        Transportation Needs    In the past 12 months, has lack of transportation kept you from medical appointments or from getting medications? No     In the past 12 months, has lack of transportation kept you from meetings, work, or from getting things needed for daily living? No        Food Insecurity    Within the past 12 months, you worried that your food would run out before you got the money to buy more. Never true     Within the past 12 months, the food you bought just didn't last and you didn't have money to get more. Never true        Stress    Do you feel stress - tense, restless, nervous, or anxious, or unable to sleep at night because your mind is troubled all the time - these days? Only a little        Social Connections    In a typical week, how many times do you talk on the phone with family, friends, or neighbors? Once a week     How often do you get together with friends or relatives? Once a week     How often do you attend Scientology or Pentecostalism services? More than 4 times per year     Do you belong to any clubs or organizations such as Scientology groups, unions, fraternal or athletic groups, or school groups? No     How often do you attend meetings of the clubs or organizations you belong to? Never        Alcohol Use    Q1: How often do you have a drink containing alcohol? Never     Q2: How many drinks containing alcohol do you have on a typical day when you are drinking? Patient does not drink     Q3: How often do you  have six or more drinks on one occasion? Never        OTHER    Name(s) of People in Home [facility]

## 2023-12-06 NOTE — ASSESSMENT & PLAN NOTE
Patient with acute kidney injury/acute renal failure likely due to pre-renal azotemia due to dehydration COURTNEY is currently stable. Baseline creatinine  0.6  - Labs reviewed- Renal function/electrolytes with Estimated Creatinine Clearance: 25.4 mL/min (A) (based on SCr of 1.7 mg/dL (H)). according to latest data. Monitor urine output and serial BMP and adjust therapy as needed. Avoid nephrotoxins and renally dose meds for GFR listed above.    Continue IV fluids

## 2023-12-06 NOTE — HPI
Patient is a 63-year-old female with history of severe CVA with aphasia, upper and lower extremity weakness, bed-bound, cognitive impairment, hypertension, hyperlipidemia, type 2 diabetes, COPD presents to the emergency room for concerns of hematuria and hypoxia.  Patient nonverbal at baseline, history provided by ED physician.  Tried to contact daughter, was unsuccessful.  Per ED, patient presented from [facility], staff noticed bloody urine output from her chronic indwelling Mathur catheter as well as hypoxia.  Non-rebreather was placed on patient and this improved her oxygenation saturation.  Brought to ED by EMS.  Per ED physician patient did have episode of emesis, likely aspirating.    On admission temp 100.4, heart rate 111, O2 89%, WBC 40.96, hemoglobin 9.6, potassium 5.2, creatinine 1.7, lactic 3.8, procalcitonin 133.671.  Review of CXR did not show any obvious consolidations.  UA consistent with UTI.

## 2023-12-06 NOTE — ASSESSMENT & PLAN NOTE
C/c issue   Latest Reference Range & Units 10/24/23 14:59 10/25/23 05:22 10/26/23 05:19 10/27/23 05:43 12/05/23 21:34 12/06/23 05:26   WBC 3.90 - 12.70 K/uL 17.99 (H) 16.96 (H) 14.13 (H) 12.97 (H) 40.96 (HH) 39.98 (HH)

## 2023-12-06 NOTE — H&P
[facility] - Emergency Dept  Hospital Medicine  History & Physical    Patient Name: Steff Johnson  MRN: [id number]  Patient Class: IP- Inpatient  Admission Date: 12/5/2023  Attending Physician: Amber Fernández MD   Primary Care Provider: Cristina Ren MD         Patient information was obtained from ER records.     Subjective:     Principal Problem:Severe sepsis    Chief Complaint:   Chief Complaint   Patient presents with    Hematuria     From [facility], for blood in catheter. When ems arrived, pt on NRB, and vomited in mask. Pt is nonverbal    Emesis    Aspiration        HPI: Patient is a 63-year-old female with history of severe CVA with aphasia, upper and lower extremity weakness, bed-bound, cognitive impairment, hypertension, hyperlipidemia, type 2 diabetes, COPD presents to the emergency room for concerns of hematuria and hypoxia.  Patient nonverbal at baseline, history provided by ED physician.  Tried to contact daughter, was unsuccessful.  Per ED, patient presented from [facility], staff noticed bloody urine output from her chronic indwelling Mathur catheter as well as hypoxia.  Non-rebreather was placed on patient and this improved her oxygenation saturation.  Brought to ED by EMS.  Per ED physician patient did have episode of emesis, likely aspirating.    On admission temp 100.4, heart rate 111, O2 89%, WBC 40.96, hemoglobin 9.6, potassium 5.2, creatinine 1.7, lactic 3.8, procalcitonin 133.671.  Review of CXR did not show any obvious consolidations.  UA consistent with UTI.    Past Medical History:   Diagnosis Date    Arthritis     Complete tear of left rotator cuff 11/09/2017    COPD (chronic obstructive pulmonary disease)     COVID-19 infection 07/02/2021    Diabetes mellitus     H/o Cerebrovascular accident (CVA) of left pontine structure 12/12/2019    Hypertension     Personal history of colonic polyps     Stroke     Wears glasses        Past Surgical History:   Procedure  Laterality Date    COLONOSCOPY N/A 4/11/2017    Procedure: COLONOSCOPY;  Surgeon: Jared Antonio MD;  Location: [facility];  Service: Endoscopy;  Laterality: N/A;    HYSTERECTOMY      OOPHORECTOMY      SHOULDER SURGERY      R    TRANSESOPHAGEAL ECHOCARDIOGRAM WITH POSSIBLE CARDIOVERSION (GT W/ POSS CARDIOVERSION) N/A 5/4/2023    Procedure: Transesophageal echo (GT) intra-procedure log documentation;  Surgeon: Blade Phillip MD;  Location: [facility] CATH/EP LAB;  Service: Cardiology;  Laterality: N/A;       Review of patient's allergies indicates:  No Known Allergies    No current facility-administered medications on file prior to encounter.     Current Outpatient Medications on File Prior to Encounter   Medication Sig    amLODIPine (NORVASC) 10 MG tablet Take 1 tablet (10 mg total) by mouth once daily. (Patient taking differently: 10 mg by Per G Tube route once daily.)    apixaban (ELIQUIS) 2.5 mg Tab 2.5 mg by Per G Tube route 2 (two) times daily.    aspirin 81 MG Chew Take 1 tablet (81 mg total) by mouth once daily. (Patient taking differently: 81 mg by Per G Tube route once daily.)    atorvastatin (LIPITOR) 80 MG tablet Take 1 tablet (80 mg total) by mouth every evening. (Patient taking differently: 80 mg by Per G Tube route every evening.)    folic acid (FOLVITE) 1 MG tablet 1 tablet by Per G Tube route every morning.    furosemide (LASIX) 40 MG tablet 40 mg by Per G Tube route once daily.    insulin (LANTUS SOLOSTAR U-100 INSULIN) glargine 100 units/mL SubQ pen Inject 35 Units into the skin once daily.    insulin aspart U-100 (NOVOLOG) 100 unit/mL injection Inject 4 Units into the skin As instructed for High Blood Sugar (Four times daily per sliding scale). 0 -150 = 0 units  151 - 200 = 4 units  201 - 250 = 8 units  251 - 300 = 10 units  301 - 350 = 12 units  351 - 400 = 16 units  401 - 1000 = 20 units call MD    latanoprost 0.005 % ophthalmic solution Place 1 drop into both eyes once daily. (Patient taking  differently: Place 1 drop into both eyes every evening.)    levETIRAcetam (KEPPRA) 100 mg/mL Soln 500 mg by Per G Tube route 2 (two) times daily.    losartan (COZAAR) 100 MG tablet Take 1 tablet (100 mg total) by mouth once daily. (Patient taking differently: 100 mg by Per G Tube route once daily.)    magnesium oxide (MAG-OX) 400 mg (241.3 mg magnesium) tablet 400 mg by Per G Tube route once daily.    metFORMIN (GLUCOPHAGE) 500 MG tablet 1,000 mg by Per G Tube route 2 (two) times daily with meals.    metoprolol tartrate (LOPRESSOR) 25 MG tablet 25 mg by Per G Tube route 2 (two) times daily.    multivitamin (THERAGRAN) per tablet 1 tablet by Per G Tube route once daily.    polyethylene glycol (GLYCOLAX) 17 gram PwPk 17 g by Per G Tube route once daily.    thiamine (VITAMIN B-1) 100 MG tablet 100 mg by Per G Tube route once daily.    baclofen (LIORESAL) 5 mg Tab tablet 5 mg by Per G Tube route every 8 (eight) hours as needed (muscle spasms).    cloNIDine (CATAPRES) 0.1 MG tablet Take 1 tablet (0.1 mg total) by mouth 3 (three) times daily. (Patient taking differently: Take 0.1 mg by mouth 4 (four) times daily as needed (For systolic over 160).)    metoprolol succinate (TOPROL-XL) 50 MG 24 hr tablet Take 1 tablet (50 mg total) by mouth once daily.    minoxidiL (LONITEN) 10 MG Tab Take 10 mg by mouth 2 (two) times daily.    tiotropium (SPIRIVA) 18 mcg inhalation capsule Inhale 1 capsule (18 mcg total) into the lungs once daily. Controller    [DISCONTINUED] blood-glucose meter (TRUE METRIX GLUCOSE METER) kit Use as instructed    [DISCONTINUED] lancing device (TRUEDRAW LANCING DEVICE) Misc Inject 1 Device into the skin 2 (two) times daily.     Family History       Problem Relation (Age of Onset)    Anemia Daughter    Asthma Mother    Diabetes Mother, Father, Brother    Hypertension Mother, Brother          Tobacco Use    Smoking status: Never    Smokeless tobacco: Never   Substance and Sexual Activity    Alcohol use: No     Drug use: No    Sexual activity: Not Currently     Review of Systems   Unable to perform ROS: Patient nonverbal     Objective:     Vital Signs (Most Recent):  Temp: (!) 100.4 °F (38 °C) (12/05/23 2015)  Pulse: (!) 112 (12/05/23 2028)  Resp: (!) 27 (12/05/23 2028)  BP: 138/69 (12/05/23 2028)  SpO2: 100 % (12/05/23 2028) Vital Signs (24h Range):  Temp:  [100.4 °F (38 °C)] 100.4 °F (38 °C)  Pulse:  [111-112] 112  Resp:  [27-30] 27  SpO2:  [89 %-100 %] 100 %  BP: (122-138)/(63-69) 138/69     Weight: 56.2 kg (123 lb 14.4 oz)  Body mass index is 25.02 kg/m².     Physical Exam  Vitals reviewed.   Constitutional:       Appearance: She is ill-appearing and toxic-appearing.   HENT:      Head: Normocephalic and atraumatic.      Right Ear: External ear normal.      Left Ear: External ear normal.      Nose: Nose normal.      Mouth/Throat:      Mouth: Mucous membranes are dry.   Cardiovascular:      Rate and Rhythm: Regular rhythm. Tachycardia present.   Pulmonary:      Effort: No respiratory distress.      Breath sounds: No wheezing.   Abdominal:      General: There is no distension.      Palpations: Abdomen is soft.   Musculoskeletal:      Comments: Lower extremities rigid, decreased range of motion   Skin:     General: Skin is dry.      Comments: Sacral wound   Neurological:      Mental Status: Mental status is at baseline.                Significant Labs: All pertinent labs within the past 24 hours have been reviewed.  CBC:   Recent Labs   Lab 12/05/23 2028 12/05/23 2134   WBC  --  40.96*   HGB  --  9.6*   HCT 27* 28.7*   PLT  --  577*     CMP:   Recent Labs   Lab 12/05/23 2134   *   K 5.2*   CL 94*   CO2 28   *   BUN 67*   CREATININE 1.7*   CALCIUM 9.6   PROT 7.4   ALBUMIN 3.3*   BILITOT 0.5   ALKPHOS 98   AST 35   ALT 30   ANIONGAP 13       Significant Imaging: I have reviewed all pertinent imaging results/findings within the past 24 hours.  Assessment/Plan:     * Severe sepsis  This patient does have  "evidence of infective focus  My overall impression is sepsis.  Source: Respiratory and Urinary Tract  Antibiotics given-   Antibiotics (72h ago, onward)      Start     Stop Route Frequency Ordered    12/06/23 0900  mupirocin 2 % ointment         12/11/23 0859 Nasl 2 times daily 12/05/23 2308    12/06/23 0600  piperacillin-tazobactam 3.375 g in dextrose 5 % 100 mL IVPB (ready to mix)         -- IV Every 8 hours (non-standard times) 12/05/23 2302 12/06/23 0200  vancomycin 1.5 g in dextrose 5 % 250 mL IVPB (ready to mix)        Note to Pharmacy: Ht: 4' 11" (1.499 m)  Wt: 56.2 kg (123 lb 14.4 oz)  Estimated Creatinine Clearance: 25.4 mL/min (A) (based on SCr of 1.7 mg/dL (H)).   Body mass index is 25.02 kg/m².    -- IV Once 12/05/23 2242    12/05/23 2254  vancomycin - pharmacy to dose  (vancomycin IVPB (PEDS and ADULTS))        See Hyperspace for full Linked Orders Report.    -- IV pharmacy to manage frequency 12/05/23 2155 12/05/23 2200  piperacillin-tazobactam 3.375 g in dextrose 5 % 100 mL IVPB (ready to mix)         12/06/23 0959 IV ED 1 Time 12/05/23 1953          Latest lactate reviewed-  Recent Labs   Lab 12/05/23  2134   LACTATE 3.8*     Organ dysfunction indicated by Acute kidney injury and Acute respiratory failure    Fluid challenge Ideal Body Weight- The patient's ideal body weight is Ideal body weight: 48.8 kg (107 lb 8.4 oz) which will be used to calculate fluid bolus of 30 ml/kg for treatment of septic shock.      Post- resuscitation assessment Yes Perfusion exam was performed within 6 hours of septic shock presentation after bolus shows Adequate tissue perfusion assessed by non-invasive monitoring       Will Start Pressors- Levophed for MAP of 65  Source control achieved by:  Continue patient on empiric antibiotics, continue IV fluids, follow up blood and urine cultures.  Repeat lactic.    Hyperkalemia  This patient has hyperkalemia which is uncontrolled. We will monitor for arrhythmias with EKG or " continuous telemetry. We will treat the hyperkalemia with IV fluids, follow up repeat BMP. The likely etiology of the hyperkalemia is COURTNEY.  The patients latest potassium has been reviewed and the results are listed below  Recent Labs   Lab 12/05/23 2134   K 5.2*             UTI (urinary tract infection)  Follow up urine cultures   Continue with empiric IV antibiotics   Follow up CBC and CMP      Pressure injury of sacral region, unstageable  Wound care consulted      Aphasia  Noted      COURTNEY (acute kidney injury)  Patient with acute kidney injury/acute renal failure likely due to pre-renal azotemia due to dehydration COURTNEY is currently stable. Baseline creatinine  0.6  - Labs reviewed- Renal function/electrolytes with Estimated Creatinine Clearance: 25.4 mL/min (A) (based on SCr of 1.7 mg/dL (H)). according to latest data. Monitor urine output and serial BMP and adjust therapy as needed. Avoid nephrotoxins and renally dose meds for GFR listed above.  Continue IV fluids  Follow up renal ultrasound    CVA (cerebral vascular accident)  History of CVA, nonverbal and bed-bound at baseline  Has PEG tube, nutrition consulted for tube feedings.  We will hold tube feedings for now given aspiration.  Has chronic indwelling catheter, replaced in the emergency room.      Mixed hyperlipidemia  Continue outpatient statin      Hyperglycemia due to type 2 diabetes mellitus  Continue Levemir, started patient on sliding scale      Hypertension associated with diabetes  Continue outpatient antihypertensives, monitor blood pressure        VTE Risk Mitigation (From admission, onward)           Ordered     apixaban tablet 2.5 mg  2 times daily         12/05/23 2300     IP VTE HIGH RISK PATIENT  Once         12/05/23 2300     Place sequential compression device  Until discontinued         12/05/23 2300                               Automatic Inhaler to Nebulizer Interchange    Tiotropium (Spiriva Respimat) 5 mcg changed to Ipratropium 0.5  mg every 6 hours per [facility] Automatic Therapeutic Substitutions Protocol.    Please contact pharmacy at extension [phone] with any questions.     Thank you,   Polly Fernández MD  Department of Hospital Medicine  [facility] - Emergency Dept

## 2023-12-06 NOTE — ASSESSMENT & PLAN NOTE
This patient does have evidence of infective focus  My overall impression is sepsis.  Source: Respiratory and Urinary Tract  Antibiotics given-   Antibiotics (72h ago, onward)      Start     Stop Route Frequency Ordered    12/06/23 1300  meropenem 1 g in sodium chloride 0.9 % 100 mL IVPB (ready to mix system)        Note to Pharmacy: Original order: meropenem 1 g Q 8 hours    -- IV Every 12 hours (non-standard times) 12/06/23 1214    12/06/23 0900  mupirocin 2 % ointment         12/11/23 0859 Nasl 2 times daily 12/05/23 2308    12/05/23 2254  vancomycin - pharmacy to dose  (vancomycin IVPB (PEDS and ADULTS))        See Hyperspace for full Linked Orders Report.    -- IV pharmacy to manage frequency 12/05/23 2155          Latest lactate reviewed-  Recent Labs   Lab 12/05/23  2134 12/06/23  0243 12/06/23  0526   LACTATE 3.8* 3.3* 3.0*     Organ dysfunction indicated by Acute kidney injury and Acute respiratory failure    Fluid challenge Ideal Body Weight- The patient's ideal body weight is Ideal body weight: 48.8 kg (107 lb 8.4 oz) which will be used to calculate fluid bolus of 30 ml/kg for treatment of septic shock.      Post- resuscitation assessment Yes Perfusion exam was performed within 6 hours of septic shock presentation after bolus shows Adequate tissue perfusion assessed by non-invasive monitoring       Will not Start Pressors- Levophed for MAP of 65  Source control achieved by:  Continue patient on empiric antibiotics, continue IV fluids, follow up blood and urine cultures.  Repeat lactic.  Need CT abdomen/Pelvis once COURTNEY has been resolved

## 2023-12-06 NOTE — ED PROVIDER NOTES
Encounter Date: 12/5/2023       History     Chief Complaint   Patient presents with    Hematuria     From [facility], for blood in catheter. When ems arrived, pt on NRB, and vomited in mask. Pt is nonverbal    Emesis    Aspiration     This is a 63-year-old female presenting via EMS from [facility] for evaluation.  Patient had a previous stroke, is nonverbal and minimally responsive but at her baseline per EMS.  They said nursing staff noticed bloody urine output from her Mathur catheter this evening, as well as hypoxia.  She was not usually on supplemental oxygen however was on a non-rebreather to keep her pulse ox up.  EMS says pulse ox has been 99% on the non-rebreather EN route.  She did vomit into her non-rebreather just prior to arrival to the ED, likely aspirating.     The history is provided by the EMS personnel.     Review of patient's allergies indicates:  No Known Allergies  Past Medical History:   Diagnosis Date    Arthritis     Complete tear of left rotator cuff 11/09/2017    COPD (chronic obstructive pulmonary disease)     COVID-19 infection 07/02/2021    Diabetes mellitus     H/o Cerebrovascular accident (CVA) of left pontine structure 12/12/2019    Hypertension     Personal history of colonic polyps     Stroke     Wears glasses      Past Surgical History:   Procedure Laterality Date    COLONOSCOPY N/A 4/11/2017    Procedure: COLONOSCOPY;  Surgeon: Jared Antonio MD;  Location: [facility];  Service: Endoscopy;  Laterality: N/A;    HYSTERECTOMY      OOPHORECTOMY      SHOULDER SURGERY      R    TRANSESOPHAGEAL ECHOCARDIOGRAM WITH POSSIBLE CARDIOVERSION (GT W/ POSS CARDIOVERSION) N/A 5/4/2023    Procedure: Transesophageal echo (GT) intra-procedure log documentation;  Surgeon: Blade Phillip MD;  Location: [facility] CATH/EP LAB;  Service: Cardiology;  Laterality: N/A;     Family History   Problem Relation Age of Onset    Diabetes Mother     Asthma Mother     Hypertension Mother     Diabetes  Father     Diabetes Brother     Hypertension Brother     Anemia Daughter     Glaucoma Neg Hx     Macular degeneration Neg Hx     Retinal detachment Neg Hx      Social History     Tobacco Use    Smoking status: Never    Smokeless tobacco: Never   Substance Use Topics    Alcohol use: No    Drug use: No     Review of Systems   Unable to perform ROS: Patient nonverbal       Physical Exam     Initial Vitals [12/05/23 1910]   BP Pulse Resp Temp SpO2   122/63 (!) 111 (!) 30 (!) 100.4 °F (38 °C) (!) 89 %      MAP       --         Physical Exam    Nursing note and vitals reviewed.  Constitutional: She appears well-developed. She is not diaphoretic.   HENT:   Head: Atraumatic.   Eyes: Conjunctivae are normal.   Neck: No tracheal deviation present.   Cardiovascular:            Tachycardia   Pulmonary/Chest:   Tachypnea, right-greater-than-left coarse breath sounds   Abdominal: She exhibits no distension.   Musculoskeletal:         General: No edema.     Neurological:   Awake, blank stare, does not respond to painful stimuli   Skin: Skin is warm. Capillary refill takes 2 to 3 seconds. No erythema.         ED Course   Procedures  Labs Reviewed   CBC W/ AUTO DIFFERENTIAL - Abnormal; Notable for the following components:       Result Value    WBC 40.96 (*)     RBC 3.74 (*)     Hemoglobin 9.6 (*)     Hematocrit 28.7 (*)     MCV 77 (*)     MCH 25.7 (*)     RDW 21.8 (*)     Platelets 577 (*)     All other components within normal limits    Narrative:     WBC critical result(s) called and verbal readback obtained from Roger Wilhelm RN (ER) by SW1 12/05/2023 22:22   COMPREHENSIVE METABOLIC PANEL - Abnormal; Notable for the following components:    Sodium 135 (*)     Potassium 5.2 (*)     Chloride 94 (*)     Glucose 221 (*)     BUN 67 (*)     Creatinine 1.7 (*)     Albumin 3.3 (*)     eGFR 33.1 (*)     All other components within normal limits   LACTIC ACID, PLASMA - Abnormal; Notable for the following components:    Lactate (Lactic  Acid) 3.8 (*)     All other components within normal limits   URINALYSIS, REFLEX TO URINE CULTURE - Abnormal; Notable for the following components:    Color, UA Orange (*)     Appearance, UA Cloudy (*)     pH, UA >8.0 (*)     Protein, UA 3+ (*)     Occult Blood UA 3+ (*)     Leukocytes, UA 3+ (*)     All other components within normal limits    Narrative:     Specimen Source->Urine   B-TYPE NATRIURETIC PEPTIDE - Abnormal; Notable for the following components:     (*)     All other components within normal limits   URINALYSIS MICROSCOPIC - Abnormal; Notable for the following components:    RBC, UA >100 (*)     WBC, UA >100 (*)     Hyaline Casts, UA 28 (*)     All other components within normal limits    Narrative:     Specimen Source->Urine   ISTAT PROCEDURE - Abnormal; Notable for the following components:    POC PH 7.540 (*)     POC PCO2 34.3 (*)     POC PO2 248 (*)     POC HCO3 29.3 (*)     POC BE 7 (*)     POC Glucose 239 (*)     POC Sodium 133 (*)     POC TCO2 30 (*)     POC Hematocrit 27 (*)     All other components within normal limits   CULTURE, BLOOD    Narrative:     Collection has been rescheduled by [facility] at 12/05/2023 19:29 Reason:   Meline,ER tech said to come back in a minute. Patient needs to be   cleaned.  Collection has been rescheduled by [facility] at 12/05/2023 20:20 Reason:   Done  Collection has been rescheduled by [facility] at 12/05/2023 19:29 Reason:   Meline,ER tech said to come back in a minute. Patient needs to be   cleaned.  Collection has been rescheduled by [facility] at 12/05/2023 20:20 Reason:   Done   CULTURE, BLOOD    Narrative:     Collection has been rescheduled by [facility] at 12/05/2023 19:29 Reason:   Meline,ER tech said to come back in a minute. Patient needs to be   cleaned.  Collection has been rescheduled by [facility] at 12/05/2023 20:20 Reason:   Done  Collection has been rescheduled by [facility] at 12/05/2023 19:29 Reason:   Meline,ER tech said to come back in a minute. Patient needs to be    cleaned.  Collection has been rescheduled by [facility] at 12/05/2023 20:20 Reason:   Done   CULTURE, URINE   INFLUENZA A AND B ANTIGEN    Narrative:     Specimen Source->Nasopharyngeal Swab   PROTIME-INR   APTT   TROPONIN I HIGH SENSITIVITY   SARS-COV-2 RNA AMPLIFICATION, QUAL   PROCALCITONIN   LACTIC ACID, PLASMA     EKG Readings: (Independently Interpreted)   Initial Reading: No STEMI. Rhythm: Sinus Tachycardia. Heart Rate: 112. Ectopy: No Ectopy. Conduction: Normal.       Imaging Results              X-Ray Chest AP Portable (In process)                      Medications   lactated ringers bolus 1,686 mL (1,686 mLs Intravenous New Bag 12/5/23 2217)   piperacillin-tazobactam 3.375 g in dextrose 5 % 100 mL IVPB (ready to mix) (3.375 g Intravenous New Bag 12/5/23 2217)   vancomycin - pharmacy to dose (has no administration in time range)   vancomycin 1.5 g in dextrose 5 % 250 mL IVPB (ready to mix) (has no administration in time range)   ondansetron injection 4 mg (4 mg Intravenous Given 12/5/23 2216)   albuterol-ipratropium 2.5 mg-0.5 mg/3 mL nebulizer solution 3 mL (3 mLs Nebulization Given 12/5/23 2028)   acetaminophen suppository 650 mg (650 mg Rectal Given 12/5/23 2015)     Medical Decision Making  63-year-old with hematuria, hypoxia.  She is tachycardic and febrile.  Rectal temp is 100.4°.  She was normotensive.  Also possibly aspirated.  Sepsis workup was initiated.  CXR shows left basilar infiltrate.  Non-rebreather is keeping patient's pulse ox in the upper 90s.  ABG on a non-rebreather shows mild alkalosis and hypoxia and the patient was not require intubation at this time.  IV antibiotics and IV fluid bolus was delayed while IV access was obtained.  Workup is still pending and care will be turned over to the oncoming physician.    Amount and/or Complexity of Data Reviewed  Labs: ordered. Decision-making details documented in ED Course.  Radiology: ordered and independent interpretation performed.  ECG/medicine  tests: ordered and independent interpretation performed.    Risk  OTC drugs.  Prescription drug management.  Decision regarding hospitalization.              Attending Attestation:         Attending Critical Care:   Critical Care Times:   Direct Patient Care (initial evaluation, reassessments, and time considering the case)................................................................10 minutes.   Additional History from reviewing old medical records or taking additional history from the family, EMS, PCP, etc.......................5 minutes.   Ordering, Reviewing, and Interpreting Diagnostic Studies...............................................................................................................5 minutes.   Documentation..................................................................................................................................................................................10 minutes.   Consultation with other Physicians. .................................................................................................................................................5 minutes.   Consultation with the patient's family directly relating to the patient's condition, care, and DNR status (when patient unable)......5 minutes.   ==============================================================  Total Critical Care Time - exclusive of procedural time: 40 minutes.  ==============================================================  Critical care was necessary to treat or prevent imminent or life-threatening deterioration of the following conditions: renal failure, sepsis and respiratory failure.   The following critical care procedures were done by me (see procedure notes): airway management.   Critical care was time spent personally by me on the following activities: obtaining history from patient or relative, examination of patient, review of old charts, ordering lab, x-rays, and/or EKG,  development of treatment plan with patient or relative, ordering and performing treatments and interventions, evaluation of patient's response to treatment, discussion with consultants, interpretation of cardiac measurements and re-evaluation of patient's conition.   Critical Care Condition: life-threatening           ED Course as of 12/05/23 2244   Tue Dec 05, 2023   2215 I took over care of this patient from Dr. Tran at the end of his shift  Tonia Goldman M.D.  10:16 PM 12/5/2023   [RM]   2225 CBC has just returned with a white count of 40.96, H&H 9.6 and 28.7, platelets 570 70,000, lactate of 3.8  Troponin 6.2    Sodium 135 potassium 5.2 chloride 94 glucose 221 BUN 67 creatinine 1.7, albumin 3.1    IV fluids already in process patient is receiving lactated Ringer's 1686 mL and Zosyn 3.375 g sorry infusing.  Patient will also give vancomycin this was written by previous physician.  This will treat her for her severe sepsis possibly from left lower lobe pneumonia seen on chest x-ray, and catheter associated UTI patient's urinalysis showed more than 100 red blood cells 100 white blood cells occasional bacteria 28 hyaline cast was orange cloudy increased pH 3+ protein 3+ blood 3+ leuks  COVID in flu were negative  Tonia Goldman M.D.  10:26 PM 12/5/2023   []      ED Course User Index  [RM] Tonia Goldman MD          [facility]    Patient presents for emergent evaluation of acute hematuria and catheter, hypoxia 96-99% on non-rebreather at [facility] vomiting on arrival here that poses a possible threat to life and/or bodily function.    Differential diagnosis includes but is not limited to sepsis, aspiration, pneumonia, COVID, flu, respiratory failure, UTI, anemia, acute kidney injury.  In the ED patient found to have acute severe sepsis from catheter associated UTI, aspiration with hypoxia possible left lower lobe pneumonia, fever, acute kidney injury.   I ordered labs and personally reviewed  "them.  Labs significant for see above.    I ordered X-rays and personally reviewed them and reviewed the radiologist interpretation.  Xray significant for see above.    I ordered EKG and personally reviewed it.  EKG significant for see above.      Admission MDM  I discussed the patient presentation labs, ekg, X-rays, CT findings with the Hospitalist  Patient was managed in the ED with IV Zosyn 3.375 g and vancomycin, lactated Ringer's at 30 milliliters/kilogram IV bolus.    The response to treatment was good.  Blood pressure 112/57 heart rate 102 improved from 01/12, sats 97% on 10 L simple face mask    Patient required emergent consultation to hospitalist for admission.  Tonia Goldman M.D.     This patient does have evidence of infective focus  My overall impression is sepsis.  Source: Urinary Tract  Antibiotics given-   Antibiotics (72h ago, onward)      Start     Stop Route Frequency Ordered    12/06/23 0200  vancomycin 1.5 g in dextrose 5 % 250 mL IVPB (ready to mix)        Note to Pharmacy: Ht: 4' 11" (1.499 m)  Wt: 56.2 kg (123 lb 14.4 oz)  Estimated Creatinine Clearance: 25.4 mL/min (A) (based on SCr of 1.7 mg/dL (H)).   Body mass index is 25.02 kg/m².    -- IV Once 12/05/23 2242    12/05/23 2254  vancomycin - pharmacy to dose  (vancomycin IVPB (PEDS and ADULTS))        See Hyperspace for full Linked Orders Report.    -- IV pharmacy to manage frequency 12/05/23 2155    12/05/23 2200  piperacillin-tazobactam 3.375 g in dextrose 5 % 100 mL IVPB (ready to mix)         12/06/23 0959 IV ED 1 Time 12/05/23 1953          Latest lactate reviewed-  Recent Labs   Lab 12/05/23  2134   LACTATE 3.8*     Organ dysfunction indicated by Acute kidney injury acute respiratory failure    Fluid challenge Ideal Body Weight- The patient's ideal body weight is Ideal body weight: 48.8 kg (107 lb 8.4 oz) which will be used to calculate fluid bolus of 30 ml/kg for treatment of septic shock.      Post- resuscitation assessment Yes " Perfusion exam was performed within 6 hours of septic shock presentation after bolus shows Adequate tissue perfusion assessed by non-invasive monitoring       Will Not start Pressors- Levophed for MAP of 65  Source control achieved by: iv abx                  Clinical Impression:  Final diagnoses:  [R00.0] Tachycardia  [T83.511A, N39.0] Urinary tract infection associated with indwelling urethral catheter, initial encounter (Primary)  [R09.02] Hypoxia  [R11.10] Vomiting, unspecified vomiting type, unspecified whether nausea present          ED Disposition Condition    Admit Stable                Tonia Goldman MD  12/05/23 9036

## 2023-12-06 NOTE — PLAN OF CARE
Problem: Feeding Intolerance (Enteral Nutrition)  Goal: Feeding Tolerance  Outcome: Ongoing, Progressing  Intervention: Prevent and Manage Feeding Intolerance  Flowsheets (Taken 12/6/2023 1602)  Nutrition Support Management: tube feeding initiated

## 2023-12-06 NOTE — CARE UPDATE
12/06/23 0740   Patient Assessment/Suction   Level of Consciousness (AVPU) responds to pain   Respiratory Effort Unlabored   Expansion/Accessory Muscles/Retractions no use of accessory muscles   All Lung Fields Breath Sounds diminished   Rhythm/Pattern, Respiratory unlabored   Cough Frequency no cough   PRE-TX-O2   Device (Oxygen Therapy) Oxymask   $ Is the patient on Low Flow Oxygen? Yes   Flow (L/min) 6  (decreased from 8l)   SpO2 97 %   Pulse Oximetry Type Continuous   Pulse 86   Resp 15   Aerosol Therapy   $ Aerosol Therapy Charges Aerosol Treatment   Daily Review of Necessity (SVN) completed   Respiratory Treatment Status (SVN) given   Treatment Route (SVN) mask   Patient Position (SVN) HOB elevated   Post Treatment Assessment (SVN) breath sounds unchanged   Signs of Intolerance (SVN) none   Breath Sounds Post-Respiratory Treatment   Throughout All Fields Post-Treatment All Fields   Throughout All Fields Post-Treatment no change   Post-treatment Heart Rate (beats/min) 85   Post-treatment Resp Rate (breaths/min) 16   Education   $ Education Bronchodilator;15 min

## 2023-12-06 NOTE — SUBJECTIVE & OBJECTIVE
Past Medical History:   Diagnosis Date    Arthritis     Complete tear of left rotator cuff 11/09/2017    COPD (chronic obstructive pulmonary disease)     COVID-19 infection 07/02/2021    Diabetes mellitus     H/o Cerebrovascular accident (CVA) of left pontine structure 12/12/2019    Hypertension     Personal history of colonic polyps     Stroke     Wears glasses        Past Surgical History:   Procedure Laterality Date    COLONOSCOPY N/A 4/11/2017    Procedure: COLONOSCOPY;  Surgeon: Jared Antonio MD;  Location: [facility];  Service: Endoscopy;  Laterality: N/A;    HYSTERECTOMY      OOPHORECTOMY      SHOULDER SURGERY      R    TRANSESOPHAGEAL ECHOCARDIOGRAM WITH POSSIBLE CARDIOVERSION (GT W/ POSS CARDIOVERSION) N/A 5/4/2023    Procedure: Transesophageal echo (GT) intra-procedure log documentation;  Surgeon: Blade Phillip MD;  Location: [facility] CATH/EP LAB;  Service: Cardiology;  Laterality: N/A;       Review of patient's allergies indicates:  No Known Allergies    No current facility-administered medications on file prior to encounter.     Current Outpatient Medications on File Prior to Encounter   Medication Sig    amLODIPine (NORVASC) 10 MG tablet Take 1 tablet (10 mg total) by mouth once daily. (Patient taking differently: 10 mg by Per G Tube route once daily.)    apixaban (ELIQUIS) 2.5 mg Tab 2.5 mg by Per G Tube route 2 (two) times daily.    aspirin 81 MG Chew Take 1 tablet (81 mg total) by mouth once daily. (Patient taking differently: 81 mg by Per G Tube route once daily.)    atorvastatin (LIPITOR) 80 MG tablet Take 1 tablet (80 mg total) by mouth every evening. (Patient taking differently: 80 mg by Per G Tube route every evening.)    folic acid (FOLVITE) 1 MG tablet 1 tablet by Per G Tube route every morning.    furosemide (LASIX) 40 MG tablet 40 mg by Per G Tube route once daily.    insulin (LANTUS SOLOSTAR U-100 INSULIN) glargine 100 units/mL SubQ pen Inject 35 Units into the skin once daily.     insulin aspart U-100 (NOVOLOG) 100 unit/mL injection Inject 4 Units into the skin As instructed for High Blood Sugar (Four times daily per sliding scale). 0 -150 = 0 units  151 - 200 = 4 units  201 - 250 = 8 units  251 - 300 = 10 units  301 - 350 = 12 units  351 - 400 = 16 units  401 - 1000 = 20 units call MD    latanoprost 0.005 % ophthalmic solution Place 1 drop into both eyes once daily. (Patient taking differently: Place 1 drop into both eyes every evening.)    levETIRAcetam (KEPPRA) 100 mg/mL Soln 500 mg by Per G Tube route 2 (two) times daily.    losartan (COZAAR) 100 MG tablet Take 1 tablet (100 mg total) by mouth once daily. (Patient taking differently: 100 mg by Per G Tube route once daily.)    magnesium oxide (MAG-OX) 400 mg (241.3 mg magnesium) tablet 400 mg by Per G Tube route once daily.    metFORMIN (GLUCOPHAGE) 500 MG tablet 1,000 mg by Per G Tube route 2 (two) times daily with meals.    metoprolol tartrate (LOPRESSOR) 25 MG tablet 25 mg by Per G Tube route 2 (two) times daily.    multivitamin (THERAGRAN) per tablet 1 tablet by Per G Tube route once daily.    polyethylene glycol (GLYCOLAX) 17 gram PwPk 17 g by Per G Tube route once daily.    thiamine (VITAMIN B-1) 100 MG tablet 100 mg by Per G Tube route once daily.    baclofen (LIORESAL) 5 mg Tab tablet 5 mg by Per G Tube route every 8 (eight) hours as needed (muscle spasms).    cloNIDine (CATAPRES) 0.1 MG tablet Take 1 tablet (0.1 mg total) by mouth 3 (three) times daily. (Patient taking differently: Take 0.1 mg by mouth 4 (four) times daily as needed (For systolic over 160).)    metoprolol succinate (TOPROL-XL) 50 MG 24 hr tablet Take 1 tablet (50 mg total) by mouth once daily.    minoxidiL (LONITEN) 10 MG Tab Take 10 mg by mouth 2 (two) times daily.    tiotropium (SPIRIVA) 18 mcg inhalation capsule Inhale 1 capsule (18 mcg total) into the lungs once daily. Controller    [DISCONTINUED] blood-glucose meter (TRUE METRIX GLUCOSE METER) kit Use as  instructed    [DISCONTINUED] lancing device (TRUEDRAW LANCING DEVICE) Misc Inject 1 Device into the skin 2 (two) times daily.     Family History       Problem Relation (Age of Onset)    Anemia Daughter    Asthma Mother    Diabetes Mother, Father, Brother    Hypertension Mother, Brother          Tobacco Use    Smoking status: Never    Smokeless tobacco: Never   Substance and Sexual Activity    Alcohol use: No    Drug use: No    Sexual activity: Not Currently     Review of Systems   Unable to perform ROS: Patient nonverbal     Objective:     Vital Signs (Most Recent):  Temp: (!) 100.4 °F (38 °C) (12/05/23 2015)  Pulse: (!) 112 (12/05/23 2028)  Resp: (!) 27 (12/05/23 2028)  BP: 138/69 (12/05/23 2028)  SpO2: 100 % (12/05/23 2028) Vital Signs (24h Range):  Temp:  [100.4 °F (38 °C)] 100.4 °F (38 °C)  Pulse:  [111-112] 112  Resp:  [27-30] 27  SpO2:  [89 %-100 %] 100 %  BP: (122-138)/(63-69) 138/69     Weight: 56.2 kg (123 lb 14.4 oz)  Body mass index is 25.02 kg/m².     Physical Exam  Vitals reviewed.   Constitutional:       Appearance: She is ill-appearing and toxic-appearing.   HENT:      Head: Normocephalic and atraumatic.      Right Ear: External ear normal.      Left Ear: External ear normal.      Nose: Nose normal.      Mouth/Throat:      Mouth: Mucous membranes are dry.   Cardiovascular:      Rate and Rhythm: Regular rhythm. Tachycardia present.   Pulmonary:      Effort: No respiratory distress.      Breath sounds: No wheezing.   Abdominal:      General: There is no distension.      Palpations: Abdomen is soft.   Musculoskeletal:      Comments: Lower extremities rigid, decreased range of motion   Skin:     General: Skin is dry.      Comments: Sacral wound   Neurological:      Mental Status: Mental status is at baseline.                Significant Labs: All pertinent labs within the past 24 hours have been reviewed.  CBC:   Recent Labs   Lab 12/05/23 2028 12/05/23 2134   WBC  --  40.96*   HGB  --  9.6*   HCT 27*  28.7*   PLT  --  577*     CMP:   Recent Labs   Lab 12/05/23  2134   *   K 5.2*   CL 94*   CO2 28   *   BUN 67*   CREATININE 1.7*   CALCIUM 9.6   PROT 7.4   ALBUMIN 3.3*   BILITOT 0.5   ALKPHOS 98   AST 35   ALT 30   ANIONGAP 13       Significant Imaging: I have reviewed all pertinent imaging results/findings within the past 24 hours.

## 2023-12-06 NOTE — HOSPITAL COURSE
Patient admitted for hematuria and hypoxia.  Was started on empiric antibiotics.  Blood cultures grew Proteus ESBL, Enterococcus, Candida glabrata.  Infectious Disease consulted.  Wound care consulted for chronic sacral wound.  Patient started on vancomycin, meropenem, micafungin.  Given positive blood cultures justyna was done which was negative for endocarditis/vegetation.  CT scan showed fluid collection in the gallbladder fossa, interventional Radiology consulted recommended no intervention at this time since this is stable compared to prior imaging.  Urology was consulted given concern of hydronephrosis on CT scan although repeat retroperitoneal ultrasound showed no hydro nephrosis.  Cystoscopy done on 12/14/2023 showed no hydronephrosis, no intervention was done.  Repeat blood cultures on 12/10 have been negative so far.  Platelets noted to be elevated in the thousands, hematology consulted.  Patient had similar presentation during last episode of sepsis.  Likely secondary to infection, work up pending. Infectious Disease recommended IV antibiotics for 2 weeks.  Plan for discharge to LTAC once accepted.  Discussed with Hematology, okay to discharge follow up outpatient for workup of thrombocytosis, continue anticoagulation.  Patient medically stable for discharge to LTAC.    Physical Exam    HENT:      Head: Normocephalic and atraumatic.   Cardiovascular:      Rate and Rhythm: Normal rate.   Pulmonary:      Effort: No respiratory distress.      Breath sounds: No wheezing.   Abdominal:      General: There is no distension.      Tenderness: There is no abdominal tenderness.

## 2023-12-06 NOTE — CONSULTS
Right inner thigh fluid filled blister 3.5x1.6cm  applied triad and mepilex.      Left inner thigh erupted blister 2cm round, Triad applied with mepilex        1.4x1.1x2cm, 11-12cm undermining, red purple DTI left upper sacrum, bilateral sacrum scarring.  Cleaned and dried and applied Aquacel ag packed in sacral wound, triad to DTI and bilateral buttock, covered with Mepilex. Right lower leg pink scars, floated feet, pillow between knees, heel protectors and waffle mattress in use.

## 2023-12-06 NOTE — ASSESSMENT & PLAN NOTE
This patient has hyperkalemia which is uncontrolled. We will monitor for arrhythmias with EKG or continuous telemetry. We will treat the hyperkalemia with IV fluids, follow up repeat BMP. The likely etiology of the hyperkalemia is COURTNEY.  The patients latest potassium has been reviewed and the results are listed below  Recent Labs   Lab 12/05/23  2134   K 5.2*

## 2023-12-06 NOTE — PROGRESS NOTES
VANCOMYCIN PHARMACOKINETIC NOTE:  Vancomycin Day # 1    Objective/Assessment:    Diagnosis/Indication for Vancomycin: Severe Sepsis      63 y.o., female; Actual Body Weight = 56.2 kg (123 lb 14.4 oz).    The patient has the following labs:  12/6/2023 Estimated Creatinine Clearance: 25.4 mL/min (A) (based on SCr of 1.7 mg/dL (H)). Lab Results   Component Value Date    BUN 67 (H) 12/05/2023     Lab Results   Component Value Date    WBC 40.96 (HH) 12/05/2023          Plan:  Adjust vancomycin dose and/or frequency based on the patient's actual weight and renal function:  Initiate Vancomycin 1500 mg IV once with subsequent dose following random level. Orders have been entered into patient's chart.        Vancomycin random level has been ordered for 12/7 with AM labs.    Pharmacy will manage vancomycin therapy, monitor serum vancomycin levels, monitor renal function and adjust regimen as necessary.    Thank you for allowing us to participate in this patient's care.     Polly Quintero 12/6/2023   Department of Pharmacy  Ext [phone]

## 2023-12-06 NOTE — CONSULTS
[facility]   Department of Infectious Disease  Consult Note        PATIENT NAME: Steff Johnson  MRN: [id number]  TODAY'S DATE: 12/06/2023  ADMIT DATE: 12/5/2023  LENGTH OF STAY: 1 DAYS      CHIEF COMPLAINT: Hematuria (From [facility], for blood in catheter. When ems arrived, pt on NRB, and vomited in mask. Pt is nonverbal), Emesis, and Aspiration    PRINCIPLE PROBLEM: Severe sepsis    REASON FOR CONSULT: Gram negative sepsis    ASSESSMENT and PLAN     1. Sepsis with bacteremia with BioFire positive for Proteus species ESBL gene present   -12/5 blood cultures x2 sets with 4 of 4 bottles positive for Proteus species, ESBL gene +  -12/6 blood cultures x2 sets pending  -T-max 100.4°, WBC 40.96-->39.98  -Procalcitonin 133.671, CRP pending, serum lactate 3.8, decreased to 3.3 then 3.0  -given fluid bolus, started on Zosyn and vancomycin, change to meropenem after blood cultures positive, HR and BP stable    2. Impaired cognition and mobility s/p CVA living in SNF with chronic indwelling Del Rosario catheter and decubitus ulcer  -Urine likely source, del rosraio changed, decubitus appears clean, seen by wound care,     3. Acute respiratory failure with hypoxia with possible aspiration pneumonia, CXR no infiltrates?   -patient remains on oxygen, chest x-ray read as no acute infiltrates, scarring or atelectasis in the left mid lung    4. Acute kidney injury with creatinine clearance 28.8  -Creat 1.7, baseline 0.6, renal ultrasound with probable mild right hydronephrosis no stones; she was seen by Urology on last admission for mild right hydroureteronephrosis possibly due to neurogenic bladder and reflex versus obstruction from poorly compliant bladder - 10/24 urine culture with Enterococcus faecalis 50k-99k CFU/ml treated with amoxicillin    5. Chronic medical problems including diabetes mellitus, hypertension,  and COPD        RECOMMENDATIONS:  Stop Zosyn  Start meropenem 1 g IV every 12 hours   Continue vancomycin  with pharmacy, keep levels 15-20   Obtain CRP, MRSA screen, RVP, sputum culture-induced  Obtain CTRSS -  catheter was changed yesterday   Repeat blood cultures x2 today, repeat daily until clear  Trend WBC, inflammatory markers  Adjust antibiotic therapy based on culture results  Wound care to all affected areas  Obtain noncon CT chest  and CTRSS for better characterization of hydronephrosis and possible aspiration      D/W Dr Cochran, Wound Care, nursing, ordered labs and diagnostics, reviewed old records      Thank you for this consult. Please send Epic secure chat with any questions.    Antibiotics (From admission, onward)      Start     Stop Route Frequency Ordered    12/06/23 1300  meropenem 1 g in sodium chloride 0.9 % 100 mL IVPB (ready to mix system)        Note to Pharmacy: Original order: meropenem 1 g Q 8 hours    -- IV Every 12 hours (non-standard times) 12/06/23 1214    12/06/23 0900  mupirocin 2 % ointment         12/11/23 0859 Nasl 2 times daily 12/05/23 2308    12/05/23 2254  vancomycin - pharmacy to dose  (vancomycin IVPB (PEDS and ADULTS))        See Hyperspace for full Linked Orders Report.    -- IV pharmacy to manage frequency 12/05/23 2155            HPI      Steff Johnson is a 63 y.o. female  who is a resident of [facility] with a history of multiple strokes that has left her bed-bound and with significant cognitive impairment and aphasia with chronic medical problems including diabetes, hypertension, hyperlipidemia and COPD who was sent to the emergency room for evaluation of bloody urine output in chronic indwelling urinary catheter and hypoxia.  A non-rebreather mask was placed and had improvement of oxygen status but reportedly had an episode of emesis and there was concern for aspiration.  Patient is unable to give history and there is no one at bedside to give history so history obtained from the medical record.  On arrival to ED, her temperature was 100.4°, she was  tachycardic and hypoxic with O2 sat 89% on room air, leukocytosis with WBC 40.96, thrombocytosis with platelets 577, no left shift, H&H 9.6/28.7, elevated BUN/CR at 67/1.7 with estimated creatinine clearance 28.8 and estimated GFR 33.1, glucose elevated at 221, ALT/AST 30/35, initial lactic acid was 3.8, repeat lactic acid 3.0, hemoglobin A1c 7.8, procalcitonin elevated at 133.671, urinalysis grossly infected with pH greater than 8, hematuria, bacteriuria and pyuria.  Blood cultures x2 drawn with Proteus species and ESBL gene.    She was last hospitalized at the end of October with Enterococcus/Proteus/Klebsiella bacteremia with cultures drawn at [facility], repeat blood cultures were normal, seen by infectious Disease, Wound Care, Hematology and Urology - normal MRI of the sacrum and coccyx, chronic anemia with no evidence of bleed received a unit of PRBCs and discharged back to facility on oral amoxicillin.  She had several hospitalizations in July and August with new strokes and worsening baseline interaction.    Outdoor activities: Resident [facility]  Travel: none  Implants:  none  Antibiotic history:  see HPI    Past Medical History:   Diagnosis Date    Arthritis     Complete tear of left rotator cuff 11/09/2017    COPD (chronic obstructive pulmonary disease)     COVID-19 infection 07/02/2021    Diabetes mellitus     H/o Cerebrovascular accident (CVA) of left pontine structure 12/12/2019    Hypertension     Personal history of colonic polyps     Stroke     Wears glasses        Past Surgical History:   Procedure Laterality Date    COLONOSCOPY N/A 4/11/2017    Procedure: COLONOSCOPY;  Surgeon: Jared Antonio MD;  Location: [facility];  Service: Endoscopy;  Laterality: N/A;    HYSTERECTOMY      OOPHORECTOMY      SHOULDER SURGERY      R    TRANSESOPHAGEAL ECHOCARDIOGRAM WITH POSSIBLE CARDIOVERSION (GT W/ POSS CARDIOVERSION) N/A 5/4/2023    Procedure: Transesophageal echo (GT)  intra-procedure log documentation;  Surgeon: Blade Phillip MD;  Location: [facility] CATH/EP LAB;  Service: Cardiology;  Laterality: N/A;       Family History   Problem Relation Age of Onset    Diabetes Mother     Asthma Mother     Hypertension Mother     Diabetes Father     Diabetes Brother     Hypertension Brother     Anemia Daughter     Glaucoma Neg Hx     Macular degeneration Neg Hx     Retinal detachment Neg Hx        Social History     Socioeconomic History    Marital status: Single   Occupational History     Comment: disabled due to shoulder problems   Tobacco Use    Smoking status: Never    Smokeless tobacco: Never   Substance and Sexual Activity    Alcohol use: No    Drug use: No    Sexual activity: Not Currently     Social Determinants of Health     Financial Resource Strain: Low Risk  (12/6/2023)    Overall Financial Resource Strain (CARDIA)     Difficulty of Paying Living Expenses: Not hard at all   Food Insecurity: No Food Insecurity (12/6/2023)    Hunger Vital Sign     Worried About Running Out of Food in the Last Year: Never true     Ran Out of Food in the Last Year: Never true   Transportation Needs: No Transportation Needs (12/6/2023)    PRAPARE - Transportation     Lack of Transportation (Medical): No     Lack of Transportation (Non-Medical): No   Physical Activity: Sufficiently Active (12/6/2023)    Exercise Vital Sign     Days of Exercise per Week: 5 days     Minutes of Exercise per Session: 150+ min   Stress: No Stress Concern Present (12/6/2023)    [employer] [employer] - Occupational Stress Questionnaire     Feeling of Stress : Only a little   Social Connections: Moderately Isolated (12/6/2023)    Social Connection and Isolation Panel [NHANES]     Frequency of Communication with Friends and Family: Once a week     Frequency of Social Gatherings with Friends and Family: Once a week     Attends Hoahaoism Services: More than 4 times per year     Active Member of Clubs or  Organizations: No     Attends Club or Organization Meetings: Never     Marital Status: Living with partner   Housing Stability: Low Risk  (12/6/2023)    Housing Stability Vital Sign     Unable to Pay for Housing in the Last Year: No     Number of Places Lived in the Last Year: 2     Unstable Housing in the Last Year: No       Review of patient's allergies indicates:  No Known Allergies    Current Outpatient Medications   Medication Instructions    amLODIPine (NORVASC) 10 mg, Oral, Daily    apixaban (ELIQUIS) 2.5 mg, Per G Tube, 2 times daily    aspirin 81 mg, Oral, Daily    atorvastatin (LIPITOR) 80 mg, Oral, Nightly    baclofen (LIORESAL) 5 mg, Per G Tube, Every 8 hours PRN    cloNIDine (CATAPRES) 0.1 mg, Oral, 3 times daily    folic acid (FOLVITE) 1 MG tablet 1 tablet, Per G Tube, Every morning    furosemide (LASIX) 40 mg, Per G Tube, Daily    insulin (LANTUS SOLOSTAR U-100 INSULIN) 35 Units, Subcutaneous, Daily, <BR>    insulin aspart U-100 (NOVOLOG) 4 Units, Subcutaneous, See admin instructions, 0 -150 = 0 units<BR>151 - 200 = 4 units<BR>201 - 250 = 8 units<BR>251 - 300 = 10 units<BR>301 - 350 = 12 units<BR>351 - 400 = 16 units<BR>401 - 1000 = 20 units call MD    latanoprost 0.005 % ophthalmic solution 1 drop, Both Eyes, Daily    levETIRAcetam (KEPPRA) 500 mg, Per G Tube, 2 times daily    losartan (COZAAR) 100 mg, Oral, Daily    magnesium oxide (MAG-OX) 400 mg, Per G Tube, Daily    metFORMIN (GLUCOPHAGE) 1,000 mg, Per G Tube, 2 times daily with meals    metoprolol succinate (TOPROL-XL) 50 mg, Oral, Daily    metoprolol tartrate (LOPRESSOR) 25 mg, Per G Tube, 2 times daily    minoxidiL (LONITEN) 10 mg, Oral, 2 times daily    multivitamin (THERAGRAN) per tablet 1 tablet, Per G Tube, Daily    polyethylene glycol (GLYCOLAX) 17 g, Per G Tube, Daily    SPIRIVA WITH HANDIHALER 18 mcg, Inhalation, Daily, Controller    thiamine (VITAMIN B-1) 100 mg, Per G Tube, Daily       Review of Systems  Unable to obtain due to  patient's altered mental status from CVA, history obtained from medical record    OBJECTIVE     Temp:  [98.2 °F (36.8 °C)-100.4 °F (38 °C)] 98.2 °F (36.8 °C)  Pulse:  [] 79  Resp:  [15-30] 20  SpO2:  [89 %-100 %] 95 %  BP: (101-157)/(55-77) 120/60  Temp:  [98.2 °F (36.8 °C)-100.4 °F (38 °C)]   Temp: 98.2 °F (36.8 °C) (12/06/23 1300)  Pulse: 79 (12/06/23 1202)  Resp: 20 (12/06/23 1300)  BP: 120/60 (12/06/23 1300)  SpO2: 95 % (12/06/23 1300)    Intake/Output Summary (Last 24 hours) at 12/6/2023 1551  Last data filed at 12/6/2023 0012  Gross per 24 hour   Intake 1786 ml   Output --   Net 1786 ml        Physical Exam  General:  Chronically ill-appearing, older female, she opens eyes to voice, seems to track but does not follow commands or move any extremities spontaneously.  Eyes: Eyes with no icterus or injection.  No exudates.  Ears:  She responds to voice.  Nose: Nares patent  Mouth:  Mucous membranes appear moist, patient would not open her mouth.  Neck: No tenderness to palpation.  Cardiovascular: Regular rate and rhythm, no murmurs, right lower edema and right forearm edema noted.  Respiratory:  Clear to auscultation bilaterally, no tachypnea or increased work of breathing.  She is on nasal cannula O2.  No coughing noted.  Gastrointestinal:  Soft and obese with active bowel sounds, no tenderness to palpation, no distention.  Peg tube is clamped.  Site appears clean.  Genitourinary:  Urinary catheter with clear yellow urine, no sediment.  Musculoskeletal:  No spontaneous movement, positioned on right side propped up on pillows, heel protectors in place.  Skin:  Warm and dry, no obvious rashes.  Sacral wound is clean appearing with sterile packing material in place.  no surrounding erythema.  No odor drainage.  Bilateral thighs with blisters covered with Mepilex.  Neuro:  Occasional incomprehensible words, she turned her head to my voice saying her name but has poor tracking and does not follow commands, no  "spontaneous movement.  Eyes open to voice.  Psych:  No agitation.  VAD: PIV left upper extremity  Isolation: Contact for isolation, Special contact for resident of [facility]  12/6:                  Significant Labs: All pertinent labs within the past 24 hours have been reviewed.    CBC LAST 7 DAYS  Recent Labs   Lab 12/05/23 2028 12/05/23 2134 12/06/23 0526   WBC  --  40.96* 39.98*   RBC  --  3.74* 3.08*   HGB  --  9.6* 7.9*   HCT 27* 28.7* 23.4*   MCV  --  77* 76*   MCH  --  25.7* 25.6*   MCHC  --  33.4 33.8   RDW  --  21.8* 21.4*   PLT  --  577* 492*   MPV  --  10.4 10.7   GRAN  --  69.0 49.0   LYMPH  --  5.0* 5.0*   MONO  --  5.0 5.0   NRBC  --  0 0       CHEMISTRY LAST 7 DAYS  Recent Labs   Lab 12/05/23 2028 12/05/23 2134 12/06/23 0526   NA  --  135* 135*   K  --  5.2* 4.4   CL  --  94* 99   CO2  --  28 28   ANIONGAP  --  13 8   BUN  --  67* 61*   CREATININE  --  1.7* 1.5*   GLU  --  221* 216*   CALCIUM  --  9.6 8.4*   PH 7.540*  --   --    MG  --   --  2.2   ALBUMIN  --  3.3*  --    PROT  --  7.4  --    ALKPHOS  --  98  --    ALT  --  30  --    AST  --  35  --    BILITOT  --  0.5  --        Estimated Creatinine Clearance: 28.8 mL/min (A) (based on SCr of 1.5 mg/dL (H)).    INFLAMMATORY/PROCAL  LAST 7 DAYS  Recent Labs   Lab 12/05/23 2134   PROCAL 133.671*     No results found for: "ESR"  CRP   Date Value Ref Range Status   10/25/2023 92.6 (H) 0.0 - 8.2 mg/L Final   06/15/2023 0.65 <0.76 mg/dL Final   11/23/2021 3.21 (H) <0.76 mg/dL Final   07/04/2021 0.34 <0.76 mg/dL Final   07/03/2021 0.87 (H) <0.76 mg/dL Final   07/02/2021 1.97 (H) <0.76 mg/dL Final       PRIOR MICROBIOLOGY:  Susceptibility data from last 90 days.  Collected Specimen Info Organism Ampicillin Nitrofurantoin Tetracycline Vancomycin   10/24/23 Urine from Clean Catch Enterococcus faecalis  S  S  S  S   10/24/23 Blood from Antecubital, Right No growth after 5 days.       10/24/23 Blood from Antecubital, Left No growth after " 5 days.           LAST 7 DAYS MICROBIOLOGY   Microbiology Results (last 7 days)       Procedure Component Value Units Date/Time    Blood culture [[id number]] Collected: 12/06/23 1522    Order Status: Sent Specimen: Blood from Antecubital, Right Hand Updated: 12/06/23 1527    Narrative:      Collection has been rescheduled by [facility] at 12/06/2023 13:17 Reason:   Duplicate order waiting for dr lott to cancel. Spoke to rob RN.  Collection has been rescheduled by [facility] at 12/06/2023 13:17 Reason:   Duplicate order waiting for dr lott to cancel. Spoke to rob RN.    Blood culture [[id number]] Collected: 12/06/23 1521    Order Status: Sent Specimen: Blood from Peripheral, Right Arm Updated: 12/06/23 1527    Narrative:      Collection has been rescheduled by [facility] at 12/06/2023 13:17 Reason:   Duplicate order waiting for dr lott to cancel. Spoke to rob RN.  Collection has been rescheduled by [facility] at 12/06/2023 13:17 Reason:   Duplicate order waiting for dr lott to cancel. Spoke to rob RN.    Blood culture x two cultures. Draw prior to antibiotics. [[id number]] Collected: 12/05/23 2007    Order Status: Completed Specimen: Blood Updated: 12/06/23 1420     Blood Culture, Routine Gram stain lisa bottle: Gram negative rods      Gram stain aer bottle: Gram negative rods      Results called to and read back by:Rob Dugan RN  12/06/2023      10:39 JBM      Positive results previously called    Narrative:      Aerobic and anaerobic  Collection has been rescheduled by [facility] at 12/05/2023 19:29 Reason:   Meline,ER tech said to come back in a minute. Patient needs to be   cleaned.  Collection has been rescheduled by [facility] at 12/05/2023 20:20 Reason:   Done  Collection has been rescheduled by [facility] at 12/05/2023 19:29 Reason:   MelbonifacioER tech said to come back in a minute. Patient needs to be   cleaned.  Collection has been rescheduled by [facility] at 12/05/2023 20:20 Reason:   Done    Blood culture x two cultures. Draw prior to  antibiotics. [[id number]] Collected: 12/05/23 2017    Order Status: Completed Specimen: Blood Updated: 12/06/23 1420     Blood Culture, Routine Gram stain lisa bottle: Gram negative rods      Gram stain aer bottle: Gram negative rods      Results called to and read back by:Natalie Dugan RN  12/06/2023      10:39 JBM      Positive results previously called    Narrative:      Aerobic and anaerobic  Collection has been rescheduled by JS at 12/05/2023 19:29 Reason:   Meline,ER tech said to come back in a minute. Patient needs to be   cleaned.  Collection has been rescheduled by [email or web address] at 12/05/2023 20:20 Reason:   Done  Collection has been rescheduled by [facility] at 12/05/2023 19:29 Reason:   Collect.it,ER tech said to come back in a minute. Patient needs to be   cleaned.  Collection has been rescheduled by JS at 12/05/2023 20:20 Reason:   Done    Rapid Organism ID by PCR (from Blood culture) [[id number]]  (Abnormal) Collected: 12/05/23 2007    Order Status: Completed Updated: 12/06/23 1151     Enterococcus faecalis Not Detected     Enterococcus faecium Not Detected     Listeria monocytogenes Not Detected     Staphylococcus spp. Not Detected     Staphylococcus aureus Not Detected     Staphylococcus epidermidis Not Detected     Staphylococcus lugdunensis Not Detected     Streptococcus species Not Detected     Streptococcus agalactiae Not Detected     Streptococcus pneumoniae Not Detected     Streptococcus pyogenes Not Detected     Acinetobacter calcoaceticus/baumannii complex Not Detected     Bacteroides fragilis Not Detected     Enterobacterales See species for ID     Comment: critical result(s) called and verbal readback obtained from Meghann Al RN-ED by CHANDA 12/06/2023 11:51          Enterobacter cloacae complex Not Detected     Escherichia coli Not Detected     Klebsiella aerogenes Not Detected     Klebsiella oxytoca Not Detected     Klebsiella pneumoniae group Not Detected     Proteus Detected     Comment: critical  result(s) called and verbal readback obtained from Meghann Al RN-ED by CD3 12/06/2023 11:51          Salmonella sp Not Detected     Serratia marcescens Not Detected     Haemophilus influenzae Not Detected     Neisseria meningtidis Not Detected     Pseudomonas aeruginosa Not Detected     Stenotrophomonas maltophilia Not Detected     Candida albicans Not Detected     Candida auris Not Detected     Candida glabrata Not Detected     Candida krusei Not Detected     Candida parapsilosis Not Detected     Candida tropicalis Not Detected     Cryptococcus neoformans/gattii Not Detected     CTX-M (ESBL ) Detected     Comment: critical result(s) called and verbal readback obtained from Meghann Al RN-ED by CD3 12/06/2023 11:51          IMP (Carbapenem resistant) Not Detected     KPC resistance gene (Carbapenem resistant) Not Detected     mcr-1  Test not applicable     mec A/C  Test not applicable     mec A/C and MREJ (MRSA) gene Test not applicable     NDM (Carbapenem resistant) Not Detected     OXA-48-like (Carbapenem resistant) Not Detected     van A/B (VRE gene) Test not applicable     VIM (Carbapenem resistant) Not Detected    Narrative:      Aerobic and anaerobic     critical result(s) called and verbal readback obtained from Meghann Al RN-ED by CD3 12/06/2023 11:51    Urine culture [[id number]] Collected: 12/05/23 2003    Order Status: Completed Specimen: Urine Updated: 12/06/23 0803     Urine Culture, Routine No growth to date    Narrative:      Specimen Source->Urine              CURRENT/PREVIOUS VISIT EKG  Results for orders placed or performed during the hospital encounter of 12/05/23   EKG 12-lead    Collection Time: 12/05/23  7:35 PM    Narrative    Test Reason : R00.0,    Vent. Rate : 112 BPM     Atrial Rate : 112 BPM     P-R Int : 132 ms          QRS Dur : 066 ms      QT Int : 340 ms       P-R-T Axes : 066 051 066 degrees     QTc Int : 464 ms    Sinus tachycardia  Otherwise normal ECG  When  compared with ECG of 24-OCT-2023 14:57,  No significant change was found    Referred By: [email or web address]   SELF           Confirmed By:          Significant Imaging: I have reviewed all relevant and available imaging results/findings within the past 24 hours.    US Retroperitoneal Complete 12/06/23 0123   Limited evaluation. Probable mild right hydronephrosis.    X-Ray Chest AP Portable 12/05/23 1939   IMPRESSION: No acute pulmonary process      Roberta Samaniego NP  Date of Service: 12/06/2023  3:51 PM

## 2023-12-06 NOTE — PHARMACY MED REC
"Admission Medication History     The home medication history was taken by Sterling Damon.    You may go to "Admission" then "Reconcile Home Medications" tabs to review and/or act upon these items.     The home medication list has been updated by the Pharmacy department.   Please read ALL comments highlighted in yellow.   Please address this information as you see fit.    Feel free to contact us if you have any questions or require assistance.        Medications listed below were obtained from: Patient/family, [facility], and Nursing home  No current facility-administered medications on file prior to encounter.     Current Outpatient Medications on File Prior to Encounter   Medication Sig Dispense Refill    amLODIPine (NORVASC) 10 MG tablet Take 1 tablet (10 mg total) by mouth once daily. (Patient taking differently: 10 mg by Per G Tube route once daily.) 90 tablet 3    apixaban (ELIQUIS) 2.5 mg Tab 2.5 mg by Per G Tube route 2 (two) times daily.      aspirin 81 MG Chew Take 1 tablet (81 mg total) by mouth once daily. (Patient taking differently: 81 mg by Per G Tube route once daily.) 30 tablet 0    atorvastatin (LIPITOR) 80 MG tablet Take 1 tablet (80 mg total) by mouth every evening. (Patient taking differently: 80 mg by Per G Tube route every evening.) 30 tablet 0    folic acid (FOLVITE) 1 MG tablet 1 tablet by Per G Tube route every morning.      furosemide (LASIX) 40 MG tablet 40 mg by Per G Tube route once daily.      insulin (LANTUS SOLOSTAR U-100 INSULIN) glargine 100 units/mL SubQ pen Inject 35 Units into the skin once daily.      insulin aspart U-100 (NOVOLOG) 100 unit/mL injection Inject 4 Units into the skin As instructed for High Blood Sugar (Four times daily per sliding scale). 0 -150 = 0 units  151 - 200 = 4 units  201 - 250 = 8 units  251 - 300 = 10 units  301 - 350 = 12 units  351 - 400 = 16 units  401 - 1000 = 20 units call MD      latanoprost 0.005 % ophthalmic solution Place 1 drop " into both eyes once daily. (Patient taking differently: Place 1 drop into both eyes every evening.) 7.5 mL 6    levETIRAcetam (KEPPRA) 100 mg/mL Soln 500 mg by Per G Tube route 2 (two) times daily.      losartan (COZAAR) 100 MG tablet Take 1 tablet (100 mg total) by mouth once daily. (Patient taking differently: 100 mg by Per G Tube route once daily.) 90 tablet 3    magnesium oxide (MAG-OX) 400 mg (241.3 mg magnesium) tablet 400 mg by Per G Tube route once daily.      metFORMIN (GLUCOPHAGE) 500 MG tablet 1,000 mg by Per G Tube route 2 (two) times daily with meals.      metoprolol tartrate (LOPRESSOR) 25 MG tablet 25 mg by Per G Tube route 2 (two) times daily.      multivitamin (THERAGRAN) per tablet 1 tablet by Per G Tube route once daily.      polyethylene glycol (GLYCOLAX) 17 gram PwPk 17 g by Per G Tube route once daily.      thiamine (VITAMIN B-1) 100 MG tablet 100 mg by Per G Tube route once daily.      baclofen (LIORESAL) 5 mg Tab tablet 5 mg by Per G Tube route every 8 (eight) hours as needed (muscle spasms).      cloNIDine (CATAPRES) 0.1 MG tablet Take 1 tablet (0.1 mg total) by mouth 3 (three) times daily. (Patient taking differently: Take 0.1 mg by mouth 4 (four) times daily as needed (For systolic over 160).) 90 tablet 11    metoprolol succinate (TOPROL-XL) 50 MG 24 hr tablet Take 1 tablet (50 mg total) by mouth once daily. 30 tablet 0    minoxidiL (LONITEN) 10 MG Tab Take 10 mg by mouth 2 (two) times daily.      tiotropium (SPIRIVA) 18 mcg inhalation capsule Inhale 1 capsule (18 mcg total) into the lungs once daily. Controller 30 capsule 0    [DISCONTINUED] blood-glucose meter (TRUE METRIX GLUCOSE METER) kit Use as instructed 1 each 0    [DISCONTINUED] lancing device (TRUEDRAW LANCING DEVICE) Misc Inject 1 Device into the skin 2 (two) times daily. 1 each 3             Sterling Damon  EXT 1924                .

## 2023-12-06 NOTE — CONSULTS
"[facility]  Adult Nutrition   Consult Note (Initial Assessment)     SUMMARY     Recommendations  Recommendation/Intervention:   1. Initiate Glucerna 1.5 @ 15 mL/hr; advance as tolerated by 5 ml q4-6 hours until goal rate of 40 mL/hr is achieved.  mL q4. Provides 1140 kcal, 61g PRO, 1390 mL h20.     2. Will monitor TF tolerance, labs, meds and follow up 1x weekly.    Goals:  1. Pt to achieve >75% EEN.EPN  Nutrition Goal Status:  new  Communication of RD Recs: reviewed with RN    Dietitian Rounds Brief  Pt is a 62 y/o female admitted with severe sepsis, and pmhx including HTN, CVA, aphasia, PEG, pressure injury, UTI, COPD, CKD III, and DM. Pt is non-verbal, information obtained via Epic record and communication with RN.     Per Epic, pt had previously been receiving Glucerna 1.5 continuously with additional FWF to meet fluid requirements. Ms. Johnson's energy needs and PRO needs were assessed--recommendations for TF initiation and advancement above. Utilizing Glucerna 1.5 to minimize total volume while meeting needs in the context of DM and for optimal BG control.       Following 2x weekly and will monitor TF tolerance, labs, meds.         Diet order:   Current Diet Order: (P) NPO                 Evaluation of Received Nutrient/Fluid Intake  Energy Calories Required: (P) not meeting needs  Protein Required: (P) not meeting needs  Fluid Required: (P) not meeting needs  Tolerance: (P) other (see comments) (NPO)     % Intake of Estimated Energy Needs: 0%  % Meal Intake: NPO      Intake/Output Summary (Last 24 hours) at 12/6/2023 1539  Last data filed at 12/6/2023 0012  Gross per 24 hour   Intake 1786 ml   Output --   Net 1786 ml        Anthropometrics  Temp: 98.2 °F (36.8 °C)  Height Method: Estimated  Height: 4' 11" (149.9 cm)  Height (inches): 59 in  Weight Method: Estimated  Weight: 56.2 kg (123 lb 14.4 oz)  Weight (lb): 123.9 lb  Ideal Body Weight (IBW), Female: 95 lb  % Ideal Body Weight, Female " (lb): 130.42 %  BMI (Calculated): 25       Estimated/Assessed Needs  Weight Used For Calorie Calculations: (P) 56.2 kg (123 lb 14.4 oz)  Energy Calorie Requirements (kcal): (P) 1124 kcal - 1405 kcal (20-25 kcal/kg)  Energy Need Method: (P) Kcal/kg  Protein Requirements: (P) 43-51g/kg (1.2-1.5g/kg IBW)  Weight Used For Protein Calculations: (P) 43.1 kg (95 lb)  Fluid Requirements (mL): (P) 1405     RDA Method (mL): (P) 1124       Reason for Assessment  Reason For Assessment: (P) consult  Diagnosis: (P) infection/sepsis  Relevant Medical History: (P) CVA, Aphasia, HTN associated w/ diabetes, hyperglycemia d/t T2DM, pressure injury, UTI, hyperkalemia, COPD, CKD III    Nutrition/Diet History       Nutrition Risk Screen  Nutrition Risk Screen: tube feeding or parenteral nutrition             Weight History:  Wt Readings from Last 5 Encounters:   12/05/23 56.2 kg (123 lb 14.4 oz)   11/21/23 56.2 kg (124 lb)   10/24/23 56.5 kg (124 lb 9 oz)   08/12/23 55.8 kg (123 lb 0.3 oz)   08/11/23 58 kg (127 lb 13.9 oz)        Medications:Pertinent Medications Reviewed  Scheduled Meds:   amLODIPine  10 mg Oral Daily    apixaban  2.5 mg Per G Tube BID    aspirin  81 mg Per G Tube Daily    atorvastatin  80 mg Per G Tube QHS    famotidine  20 mg Oral Daily    insulin detemir U-100  35 Units Subcutaneous Daily    ipratropium  0.5 mg Nebulization Q6H    levETIRAcetam  500 mg Oral BID    meropenem (MERREM) IVPB  1 g Intravenous Q12H    metoprolol tartrate  25 mg Per G Tube BID    mupirocin   Nasal BID    polyethylene glycol  17 g Per G Tube Daily     Continuous Infusions:   lactated ringers 125 mL/hr at 12/06/23 0613     PRN Meds:.acetaminophen, albuterol-ipratropium, dextrose 50%, dextrose 50%, glucagon (human recombinant), glucose, glucose, insulin aspart U-100, magnesium oxide, magnesium oxide, ondansetron, potassium bicarbonate, potassium bicarbonate, potassium bicarbonate, potassium, sodium phosphates, potassium, sodium phosphates,  potassium, sodium phosphates, sodium chloride 0.9%, Pharmacy to dose Vancomycin consult **AND** vancomycin - pharmacy to dose    Labs: Pertinent Labs Reviewed  Clinical Chemistry:  Recent Labs   Lab 12/05/23 2134 12/06/23 0526   * 135*   K 5.2* 4.4   CL 94* 99   CO2 28 28   * 216*   BUN 67* 61*   CREATININE 1.7* 1.5*   CALCIUM 9.6 8.4*   PROT 7.4  --    ALBUMIN 3.3*  --    BILITOT 0.5  --    ALKPHOS 98  --    AST 35  --    ALT 30  --    ANIONGAP 13 8   MG  --  2.2     CBC:   Recent Labs   Lab 12/06/23 0526   WBC 39.98*   RBC 3.08*   HGB 7.9*   HCT 23.4*   *   MCV 76*   MCH 25.6*   MCHC 33.8       Monitor and Evaluation  Food and Nutrient Intake: (P) energy intake, food and beverage intake, enteral nutrition intake  Food and Nutrient Adminstration: (P) diet order, enteral and parenteral nutrition administration  Knowledge/Beliefs/Attitudes: (P) beliefs and attitudes  Physical Activity and Function: (P) nutrition-related ADLs and IADLs  Anthropometric Measurements: (P) weight, weight change, body mass index  Biochemical Data, Medical Tests and Procedures: (P) electrolyte and renal panel, gastrointestinal profile, glucose/endocrine profile, inflammatory profile, lipid profile     Nutrition Risk  Level of Risk/Frequency of Follow-up: (P) high (2x weekly)     Nutrition Follow-Up  RD Follow-up?: (P) Yes      Niko Pimentel, Provisional Licensed Dietitian 12/06/2023 3:39 PM

## 2023-12-06 NOTE — PROGRESS NOTES
Automatic Inhaler to Nebulizer Interchange    Tiotropium (Spiriva Respimat) 5 mcg changed to Ipratropium 0.5 mg every 6 hours per [facility] Automatic Therapeutic Substitutions Protocol.    Please contact pharmacy at extension [phone] with any questions.     Thank you,   Polly Quintero

## 2023-12-06 NOTE — PROGRESS NOTES
"Pharmacist Renal Dose Adjustment Note    Steff Johnson is a 63 y.o. female being treated with the medication Meropenem    Patient Data:    Vital Signs (Most Recent):  Temp: (!) 100.4 °F (38 °C) (12/05/23 2015)  Pulse: 79 (12/06/23 1202)  Resp: 20 (12/06/23 1202)  BP: 139/66 (12/06/23 1202)  SpO2: 100 % (12/06/23 1202) Vital Signs (72h Range):  Temp:  [100.4 °F (38 °C)]   Pulse:  []   Resp:  [15-30]   BP: (101-157)/(55-77)   SpO2:  [89 %-100 %]        Ht: 4' 11" (1.499 m)  Wt: 56.2 kg (123 lb 14.4 oz)  Estimated Creatinine Clearance: 28.8 mL/min (A) (based on SCr of 1.5 mg/dL (H)).  Body mass index is 25.02 kg/m².    Per [facility] renal dosing protocol:     Previous Order: Meropenem 1 g Q 8 hours    Will be changed to:     New Order: Meropenem 1 g Q 12 hours,    Due to: crcl 26-50 ml/min    Renal dose adjustments performed as noted above.    We will continue monitoring and adjusting as necessary.    Pharmacist: Irving Blum, PharmD  Ext: [phone]      "

## 2023-12-06 NOTE — H&P
[facility] - Emergency Dept  Hospital Medicine  History & Physical    Patient Name: Steff Johnson  MRN: [id number]  Patient Class: IP- Inpatient  Admission Date: 12/5/2023  Attending Physician: Amber Fernández MD   Primary Care Provider: Cristina Ren MD         Patient information was obtained from patient and ER records.     Subjective:     Principal Problem:Severe sepsis    Chief Complaint:   Chief Complaint   Patient presents with    Hematuria     From [facility], for blood in catheter. When ems arrived, pt on NRB, and vomited in mask. Pt is nonverbal    Emesis    Aspiration        HPI: Patient is a 63-year-old female with history of severe CVA with aphasia, upper and lower extremity weakness, bed-bound, cognitive impairment, hypertension, hyperlipidemia, type 2 diabetes, COPD presents to the emergency room for concerns of hematuria and hypoxia.  Patient nonverbal at baseline, history provided by ED physician.  Tried to contact daughter, was unsuccessful.  Per ED, patient presented from [facility], staff noticed bloody urine output from her chronic indwelling Mathur catheter as well as hypoxia.  Non-rebreather was placed on patient and this improved her oxygenation saturation.  Brought to ED by EMS.  Per ED physician patient did have episode of emesis, likely aspirating.    On admission temp 100.4, heart rate 111, O2 89%, WBC 40.96, hemoglobin 9.6, potassium 5.2, creatinine 1.7, lactic 3.8, procalcitonin 133.671.  Review of CXR did not show any obvious consolidations.  UA consistent with UTI.    No new subjective & objective note has been filed under this hospital service since the last note was generated.    Assessment/Plan:     * Severe sepsis  This patient does have evidence of infective focus  My overall impression is sepsis.  Source: Respiratory and Urinary Tract  Antibiotics given-   Antibiotics (72h ago, onward)      Start     Stop Route Frequency Ordered    12/06/23 0900   "mupirocin 2 % ointment         12/11/23 0859 Nasl 2 times daily 12/05/23 2308    12/06/23 0600  piperacillin-tazobactam 3.375 g in dextrose 5 % 100 mL IVPB (ready to mix)         -- IV Every 8 hours (non-standard times) 12/05/23 2302    12/06/23 0200  vancomycin 1.5 g in dextrose 5 % 250 mL IVPB (ready to mix)        Note to Pharmacy: Ht: 4' 11" (1.499 m)  Wt: 56.2 kg (123 lb 14.4 oz)  Estimated Creatinine Clearance: 25.4 mL/min (A) (based on SCr of 1.7 mg/dL (H)).   Body mass index is 25.02 kg/m².    -- IV Once 12/05/23 2242    12/05/23 2254  vancomycin - pharmacy to dose  (vancomycin IVPB (PEDS and ADULTS))        See Hyperspace for full Linked Orders Report.    -- IV pharmacy to manage frequency 12/05/23 2155 12/05/23 2200  piperacillin-tazobactam 3.375 g in dextrose 5 % 100 mL IVPB (ready to mix)         12/06/23 0959 IV ED 1 Time 12/05/23 1953          Latest lactate reviewed-  Recent Labs   Lab 12/05/23 2134   LACTATE 3.8*     Organ dysfunction indicated by Acute kidney injury and Acute respiratory failure    Fluid challenge Ideal Body Weight- The patient's ideal body weight is Ideal body weight: 48.8 kg (107 lb 8.4 oz) which will be used to calculate fluid bolus of 30 ml/kg for treatment of septic shock.      Post- resuscitation assessment Yes Perfusion exam was performed within 6 hours of septic shock presentation after bolus shows Adequate tissue perfusion assessed by non-invasive monitoring       Will Start Pressors- Levophed for MAP of 65  Source control achieved by:  Continue patient on empiric antibiotics, continue IV fluids, follow up blood and urine cultures.  Repeat lactic.    Hyperkalemia  This patient has hyperkalemia which is uncontrolled. We will monitor for arrhythmias with EKG or continuous telemetry. We will treat the hyperkalemia with IV fluids, follow up repeat BMP. The likely etiology of the hyperkalemia is COURTNEY.  The patients latest potassium has been reviewed and the results are listed " below  Recent Labs   Lab 12/05/23 2134   K 5.2*             UTI (urinary tract infection)  Follow up urine cultures   Continue with empiric IV antibiotics   Follow up CBC and CMP      Pressure injury of sacral region, unstageable  Wound care consulted      Aphasia  Noted      COURTNEY (acute kidney injury)  Patient with acute kidney injury/acute renal failure likely due to pre-renal azotemia due to dehydration COURTNEY is currently stable. Baseline creatinine  0.6  - Labs reviewed- Renal function/electrolytes with Estimated Creatinine Clearance: 25.4 mL/min (A) (based on SCr of 1.7 mg/dL (H)). according to latest data. Monitor urine output and serial BMP and adjust therapy as needed. Avoid nephrotoxins and renally dose meds for GFR listed above.  Continue IV fluids  Follow up renal ultrasound    CVA (cerebral vascular accident)  History of CVA, nonverbal and bed-bound at baseline  Has PEG tube, nutrition consulted for tube feedings.  We will hold tube feedings for now given aspiration.  Has chronic indwelling catheter, replaced in the emergency room.      Mixed hyperlipidemia  Continue outpatient statin      Hyperglycemia due to type 2 diabetes mellitus  Continue Levemir, started patient on sliding scale      Hypertension associated with diabetes  Continue outpatient antihypertensives, monitor blood pressure        VTE Risk Mitigation (From admission, onward)           Ordered     apixaban tablet 2.5 mg  2 times daily         12/05/23 2300     IP VTE HIGH RISK PATIENT  Once         12/05/23 2300     Place sequential compression device  Until discontinued         12/05/23 2300                               Automatic Inhaler to Nebulizer Interchange    Tiotropium (Spiriva Respimat) 5 mcg changed to Ipratropium 0.5 mg every 6 hours per [facility] Automatic Therapeutic Substitutions Protocol.    Please contact pharmacy at extension [phone] with any questions.     Thank you,   Yen Silvana Fernández MD  Department of Hospital  Medicine  [facility] - Emergency Dept

## 2023-12-06 NOTE — PROGRESS NOTES
[facility] - Emergency Dept  Hospital Medicine  Progress Note    Patient Name: Steff Johnson  MRN: [id number]  Patient Class: IP- Inpatient   Admission Date: 12/5/2023  Length of Stay: 1 days  Attending Physician: Roger Berry MD  Primary Care Provider: Cristina Ren MD        Subjective:     Principal Problem:Severe sepsis        HPI:  Patient is a 63-year-old female with history of severe CVA with aphasia, upper and lower extremity weakness, bed-bound, cognitive impairment, hypertension, hyperlipidemia, type 2 diabetes, COPD presents to the emergency room for concerns of hematuria and hypoxia.  Patient nonverbal at baseline, history provided by ED physician.  Tried to contact daughter, was unsuccessful.  Per ED, patient presented from [facility], staff noticed bloody urine output from her chronic indwelling Mathur catheter as well as hypoxia.  Non-rebreather was placed on patient and this improved her oxygenation saturation.  Brought to ED by EMS.  Per ED physician patient did have episode of emesis, likely aspirating.    On admission temp 100.4, heart rate 111, O2 89%, WBC 40.96, hemoglobin 9.6, potassium 5.2, creatinine 1.7, lactic 3.8, procalcitonin 133.671.  Review of CXR did not show any obvious consolidations.  UA consistent with UTI.    Overview/Hospital Course:  12/6  BC grew gram negative rods  Unable to do CT abdomen / pelvis W contrast die to COURTNEY    Interval History:         Review of Systems   Unable to perform ROS: Patient nonverbal     Objective:     Vital Signs (Most Recent):  Temp: (!) 100.4 °F (38 °C) (12/05/23 2015)  Pulse: 79 (12/06/23 1202)  Resp: 20 (12/06/23 1202)  BP: 139/66 (12/06/23 1202)  SpO2: 100 % (12/06/23 1202) Vital Signs (24h Range):  Temp:  [100.4 °F (38 °C)] 100.4 °F (38 °C)  Pulse:  [] 79  Resp:  [15-30] 20  SpO2:  [89 %-100 %] 100 %  BP: (101-157)/(55-77) 139/66     Weight: 56.2 kg (123 lb 14.4 oz)  Body mass index is 25.02 kg/m².    Intake/Output  Summary (Last 24 hours) at 12/6/2023 1327  Last data filed at 12/6/2023 0012  Gross per 24 hour   Intake 1786 ml   Output --   Net 1786 ml         Physical Exam  Vitals and nursing note reviewed.   Constitutional:       General: She is not in acute distress.     Appearance: Normal appearance.   HENT:      Head: Atraumatic.      Right Ear: External ear normal.      Left Ear: External ear normal.      Nose: Nose normal.      Mouth/Throat:      Mouth: Mucous membranes are dry.   Eyes:      Extraocular Movements: Extraocular movements intact.   Cardiovascular:      Rate and Rhythm: Normal rate.   Pulmonary:      Effort: Pulmonary effort is normal.   Abdominal:      Palpations: Abdomen is soft.   Musculoskeletal:      Right lower leg: No edema.      Left lower leg: No edema.   Skin:     General: Skin is warm.   Neurological:      Mental Status: Mental status is at baseline.             Significant Labs: All pertinent labs within the past 24 hours have been reviewed.  CBC:   Recent Labs   Lab 12/05/23 2028 12/05/23 2134 12/06/23  0526   WBC  --  40.96* 39.98*   HGB  --  9.6* 7.9*   HCT 27* 28.7* 23.4*   PLT  --  577* 492*     CMP:   Recent Labs   Lab 12/05/23 2134 12/06/23  0526   * 135*   K 5.2* 4.4   CL 94* 99   CO2 28 28   * 216*   BUN 67* 61*   CREATININE 1.7* 1.5*   CALCIUM 9.6 8.4*   PROT 7.4  --    ALBUMIN 3.3*  --    BILITOT 0.5  --    ALKPHOS 98  --    AST 35  --    ALT 30  --    ANIONGAP 13 8       Significant Imaging: I have reviewed all pertinent imaging results/findings within the past 24 hours.    Assessment/Plan:      * Severe sepsis  This patient does have evidence of infective focus  My overall impression is sepsis.  Source: Respiratory and Urinary Tract  Antibiotics given-   Antibiotics (72h ago, onward)      Start     Stop Route Frequency Ordered    12/06/23 1300  meropenem 1 g in sodium chloride 0.9 % 100 mL IVPB (ready to mix system)        Note to Pharmacy: Original order: meropenem 1  g Q 8 hours    -- IV Every 12 hours (non-standard times) 12/06/23 1214    12/06/23 0900  mupirocin 2 % ointment         12/11/23 0859 Nasl 2 times daily 12/05/23 2308    12/05/23 2254  vancomycin - pharmacy to dose  (vancomycin IVPB (PEDS and ADULTS))        See Hyperspace for full Linked Orders Report.    -- IV pharmacy to manage frequency 12/05/23 2155          Latest lactate reviewed-  Recent Labs   Lab 12/05/23  2134 12/06/23  0243 12/06/23  0526   LACTATE 3.8* 3.3* 3.0*     Organ dysfunction indicated by Acute kidney injury and Acute respiratory failure    Fluid challenge Ideal Body Weight- The patient's ideal body weight is Ideal body weight: 48.8 kg (107 lb 8.4 oz) which will be used to calculate fluid bolus of 30 ml/kg for treatment of septic shock.      Post- resuscitation assessment Yes Perfusion exam was performed within 6 hours of septic shock presentation after bolus shows Adequate tissue perfusion assessed by non-invasive monitoring       Will not Start Pressors- Levophed for MAP of 65  Source control achieved by:  Continue patient on empiric antibiotics, continue IV fluids, follow up blood and urine cultures.  Repeat lactic.  Need CT abdomen/Pelvis once COURTENY has been resolved     CVA (cerebral vascular accident)  History of CVA, nonverbal and bed-bound at baseline  Has PEG tube, nutrition consulted for tube feedings.    Has chronic indwelling catheter, replaced in the emergency room.      Leucocytosis  C/c issue   Latest Reference Range & Units 10/24/23 14:59 10/25/23 05:22 10/26/23 05:19 10/27/23 05:43 12/05/23 21:34 12/06/23 05:26   WBC 3.90 - 12.70 K/uL 17.99 (H) 16.96 (H) 14.13 (H) 12.97 (H) 40.96 (HH) 39.98 (HH)         Hyperkalemia  This patient has hyperkalemia which is uncontrolled. We will monitor for arrhythmias with EKG or continuous telemetry. We will treat the hyperkalemia with IV fluids, follow up repeat BMP. The likely etiology of the hyperkalemia is COURTNEY.  The patients latest potassium  has been reviewed and the results are listed below  Recent Labs   Lab 12/05/23 2134   K 5.2*             UTI (urinary tract infection)  Follow up urine cultures   Continue with empiric IV antibiotics   Follow up CBC and CMP      Pressure injury of sacral region, unstageable  Wound care consulted      Aphasia  Noted      Cognitive communication deficit  Chronic issue      Combined forms of age-related cataract, bilateral  Aware      COURTNEY (acute kidney injury)  Patient with acute kidney injury/acute renal failure likely due to pre-renal azotemia due to dehydration COURTNEY is currently stable. Baseline creatinine  0.6  - Labs reviewed- Renal function/electrolytes with Estimated Creatinine Clearance: 25.4 mL/min (A) (based on SCr of 1.7 mg/dL (H)). according to latest data. Monitor urine output and serial BMP and adjust therapy as needed. Avoid nephrotoxins and renally dose meds for GFR listed above.    Continue IV fluids      Mixed hyperlipidemia  Continue outpatient statin      Hyperglycemia due to type 2 diabetes mellitus  Continue Levemir, started patient on sliding scale      Hypertension associated with diabetes  Continue outpatient antihypertensives, monitor blood pressure        VTE Risk Mitigation (From admission, onward)           Ordered     apixaban tablet 2.5 mg  2 times daily         12/05/23 2300     IP VTE HIGH RISK PATIENT  Once         12/05/23 2300     Place sequential compression device  Until discontinued         12/05/23 2300                    Discharge Planning   ALEX:      Code Status: Full Code   Is the patient medically ready for discharge?:     Reason for patient still in hospital (select all that apply): Treatment  Discharge Plan A: Return to nursing home, Skilled Nursing Facility                  Roger Berry MD  Department of Hospital Medicine   [facility] - Emergency Dept

## 2023-12-07 PROBLEM — J96.01 ACUTE RESPIRATORY FAILURE WITH HYPOXIA: Status: ACTIVE | Noted: 2023-12-07

## 2023-12-07 PROBLEM — N13.30 HYDRONEPHROSIS: Status: ACTIVE | Noted: 2023-12-07

## 2023-12-07 PROBLEM — Z74.01 BEDRIDDEN: Status: ACTIVE | Noted: 2023-12-07

## 2023-12-07 PROBLEM — Z97.8 CHRONIC INDWELLING FOLEY CATHETER: Status: ACTIVE | Noted: 2023-12-07

## 2023-12-07 PROBLEM — R23.8 BLISTERS OF MULTIPLE SITES: Status: ACTIVE | Noted: 2023-12-07

## 2023-12-07 PROBLEM — R09.02 HYPOXIA: Status: ACTIVE | Noted: 2023-12-07

## 2023-12-07 PROBLEM — B96.4 BACTEREMIA DUE TO PROTEUS SPECIES: Status: ACTIVE | Noted: 2023-12-07

## 2023-12-07 PROBLEM — R41.89 COGNITIVE IMPAIRMENT: Status: ACTIVE | Noted: 2023-12-07

## 2023-12-07 PROBLEM — L89.153 PRESSURE INJURY OF SACRAL REGION, STAGE 3: Status: ACTIVE | Noted: 2023-12-07

## 2023-12-07 PROBLEM — R78.81 BACTEREMIA DUE TO PROTEUS SPECIES: Status: ACTIVE | Noted: 2023-12-07

## 2023-12-07 PROBLEM — J69.0 ASPIRATION PNEUMONIA OF RIGHT LUNG DUE TO VOMIT: Status: ACTIVE | Noted: 2023-12-07

## 2023-12-07 LAB
ANION GAP SERPL CALC-SCNC: 10 MMOL/L (ref 8–16)
BASOPHILS # BLD AUTO: 0.08 K/UL (ref 0–0.2)
BASOPHILS NFR BLD: 0.3 % (ref 0–1.9)
BUN SERPL-MCNC: 40 MG/DL (ref 8–23)
CALCIUM SERPL-MCNC: 9.2 MG/DL (ref 8.7–10.5)
CHLORIDE SERPL-SCNC: 104 MMOL/L (ref 95–110)
CO2 SERPL-SCNC: 25 MMOL/L (ref 23–29)
CREAT SERPL-MCNC: 0.9 MG/DL (ref 0.5–1.4)
DIFFERENTIAL METHOD BLD: ABNORMAL
EOSINOPHIL # BLD AUTO: 0.2 K/UL (ref 0–0.5)
EOSINOPHIL NFR BLD: 0.8 % (ref 0–8)
ERYTHROCYTE [DISTWIDTH] IN BLOOD BY AUTOMATED COUNT: 21.5 % (ref 11.5–14.5)
EST. GFR  (NO RACE VARIABLE): >60 ML/MIN/1.73 M^2
GLUCOSE SERPL-MCNC: 122 MG/DL (ref 70–110)
GLUCOSE SERPL-MCNC: 186 MG/DL (ref 70–110)
GLUCOSE SERPL-MCNC: 210 MG/DL (ref 70–110)
GLUCOSE SERPL-MCNC: 223 MG/DL (ref 70–110)
GLUCOSE SERPL-MCNC: 269 MG/DL (ref 70–110)
HCT VFR BLD AUTO: 23 % (ref 37–48.5)
HGB BLD-MCNC: 7.9 G/DL (ref 12–16)
IMM GRANULOCYTES # BLD AUTO: 0.33 K/UL (ref 0–0.04)
IMM GRANULOCYTES NFR BLD AUTO: 1.3 % (ref 0–0.5)
LYMPHOCYTES # BLD AUTO: 1.3 K/UL (ref 1–4.8)
LYMPHOCYTES NFR BLD: 5.3 % (ref 18–48)
MAGNESIUM SERPL-MCNC: 2 MG/DL (ref 1.6–2.6)
MCH RBC QN AUTO: 26 PG (ref 27–31)
MCHC RBC AUTO-ENTMCNC: 34.3 G/DL (ref 32–36)
MCV RBC AUTO: 76 FL (ref 82–98)
MONOCYTES # BLD AUTO: 1.1 K/UL (ref 0.3–1)
MONOCYTES NFR BLD: 4.6 % (ref 4–15)
NEUTROPHILS # BLD AUTO: 21.4 K/UL (ref 1.8–7.7)
NEUTROPHILS NFR BLD: 87.7 % (ref 38–73)
NRBC BLD-RTO: 0 /100 WBC
PHOSPHATE SERPL-MCNC: 2.5 MG/DL (ref 2.7–4.5)
PLATELET # BLD AUTO: 474 K/UL (ref 150–450)
PMV BLD AUTO: 10.3 FL (ref 9.2–12.9)
POTASSIUM SERPL-SCNC: 3.9 MMOL/L (ref 3.5–5.1)
RBC # BLD AUTO: 3.04 M/UL (ref 4–5.4)
SODIUM SERPL-SCNC: 139 MMOL/L (ref 136–145)
VANCOMYCIN SERPL-MCNC: 12 UG/ML
WBC # BLD AUTO: 24.48 K/UL (ref 3.9–12.7)

## 2023-12-07 PROCEDURE — 83735 ASSAY OF MAGNESIUM: CPT | Performed by: STUDENT IN AN ORGANIZED HEALTH CARE EDUCATION/TRAINING PROGRAM

## 2023-12-07 PROCEDURE — 25000242 PHARM REV CODE 250 ALT 637 W/ HCPCS: Performed by: STUDENT IN AN ORGANIZED HEALTH CARE EDUCATION/TRAINING PROGRAM

## 2023-12-07 PROCEDURE — 84100 ASSAY OF PHOSPHORUS: CPT | Performed by: INTERNAL MEDICINE

## 2023-12-07 PROCEDURE — 99900035 HC TECH TIME PER 15 MIN (STAT)

## 2023-12-07 PROCEDURE — 63600175 PHARM REV CODE 636 W HCPCS: Performed by: INTERNAL MEDICINE

## 2023-12-07 PROCEDURE — 94640 AIRWAY INHALATION TREATMENT: CPT

## 2023-12-07 PROCEDURE — 25000003 PHARM REV CODE 250: Performed by: INTERNAL MEDICINE

## 2023-12-07 PROCEDURE — 25000003 PHARM REV CODE 250: Performed by: STUDENT IN AN ORGANIZED HEALTH CARE EDUCATION/TRAINING PROGRAM

## 2023-12-07 PROCEDURE — 80048 BASIC METABOLIC PNL TOTAL CA: CPT | Performed by: STUDENT IN AN ORGANIZED HEALTH CARE EDUCATION/TRAINING PROGRAM

## 2023-12-07 PROCEDURE — 63600175 PHARM REV CODE 636 W HCPCS: Performed by: STUDENT IN AN ORGANIZED HEALTH CARE EDUCATION/TRAINING PROGRAM

## 2023-12-07 PROCEDURE — 99900031 HC PATIENT EDUCATION (STAT)

## 2023-12-07 PROCEDURE — 80202 ASSAY OF VANCOMYCIN: CPT | Performed by: STUDENT IN AN ORGANIZED HEALTH CARE EDUCATION/TRAINING PROGRAM

## 2023-12-07 PROCEDURE — 27000221 HC OXYGEN, UP TO 24 HOURS

## 2023-12-07 PROCEDURE — 99233 SBSQ HOSP IP/OBS HIGH 50: CPT | Mod: FS,,, | Performed by: NURSE PRACTITIONER

## 2023-12-07 PROCEDURE — 85025 COMPLETE CBC W/AUTO DIFF WBC: CPT | Performed by: STUDENT IN AN ORGANIZED HEALTH CARE EDUCATION/TRAINING PROGRAM

## 2023-12-07 PROCEDURE — 94761 N-INVAS EAR/PLS OXIMETRY MLT: CPT

## 2023-12-07 PROCEDURE — 12000002 HC ACUTE/MED SURGE SEMI-PRIVATE ROOM

## 2023-12-07 PROCEDURE — 36415 COLL VENOUS BLD VENIPUNCTURE: CPT | Performed by: STUDENT IN AN ORGANIZED HEALTH CARE EDUCATION/TRAINING PROGRAM

## 2023-12-07 RX ORDER — VANCOMYCIN HCL IN 5 % DEXTROSE 1G/250ML
1000 PLASTIC BAG, INJECTION (ML) INTRAVENOUS ONCE
Status: DISCONTINUED | OUTPATIENT
Start: 2023-12-07 | End: 2023-12-07

## 2023-12-07 RX ADMIN — APIXABAN 2.5 MG: 2.5 TABLET, FILM COATED ORAL at 08:12

## 2023-12-07 RX ADMIN — MUPIROCIN 1 G: 20 OINTMENT TOPICAL at 08:12

## 2023-12-07 RX ADMIN — METOPROLOL TARTRATE 25 MG: 25 TABLET, FILM COATED ORAL at 08:12

## 2023-12-07 RX ADMIN — SODIUM CHLORIDE, SODIUM LACTATE, POTASSIUM CHLORIDE, AND CALCIUM CHLORIDE: .6; .31; .03; .02 INJECTION, SOLUTION INTRAVENOUS at 08:12

## 2023-12-07 RX ADMIN — MUPIROCIN 1 G: 20 OINTMENT TOPICAL at 09:12

## 2023-12-07 RX ADMIN — IPRATROPIUM BROMIDE 0.5 MG: 0.5 SOLUTION RESPIRATORY (INHALATION) at 01:12

## 2023-12-07 RX ADMIN — ATORVASTATIN CALCIUM 80 MG: 40 TABLET, FILM COATED ORAL at 09:12

## 2023-12-07 RX ADMIN — METOPROLOL TARTRATE 25 MG: 25 TABLET, FILM COATED ORAL at 09:12

## 2023-12-07 RX ADMIN — MEROPENEM 1 G: 1 INJECTION, POWDER, FOR SOLUTION INTRAVENOUS at 12:12

## 2023-12-07 RX ADMIN — LEVETIRACETAM 500 MG: 500 TABLET, FILM COATED ORAL at 08:12

## 2023-12-07 RX ADMIN — FAMOTIDINE 20 MG: 20 TABLET ORAL at 08:12

## 2023-12-07 RX ADMIN — IPRATROPIUM BROMIDE 0.5 MG: 0.5 SOLUTION RESPIRATORY (INHALATION) at 07:12

## 2023-12-07 RX ADMIN — IPRATROPIUM BROMIDE 0.5 MG: 0.5 SOLUTION RESPIRATORY (INHALATION) at 08:12

## 2023-12-07 RX ADMIN — ACETAMINOPHEN 650 MG: 650 SOLUTION ORAL at 11:12

## 2023-12-07 RX ADMIN — ASPIRIN 81 MG 81 MG: 81 TABLET ORAL at 08:12

## 2023-12-07 RX ADMIN — INSULIN ASPART 6 UNITS: 100 INJECTION, SOLUTION INTRAVENOUS; SUBCUTANEOUS at 12:12

## 2023-12-07 RX ADMIN — SODIUM CHLORIDE, SODIUM LACTATE, POTASSIUM CHLORIDE, AND CALCIUM CHLORIDE: .6; .31; .03; .02 INJECTION, SOLUTION INTRAVENOUS at 10:12

## 2023-12-07 RX ADMIN — VANCOMYCIN HYDROCHLORIDE 1250 MG: 1.25 INJECTION, POWDER, LYOPHILIZED, FOR SOLUTION INTRAVENOUS at 08:12

## 2023-12-07 RX ADMIN — POLYETHYLENE GLYCOL 3350 17 G: 17 POWDER, FOR SOLUTION ORAL at 08:12

## 2023-12-07 RX ADMIN — INSULIN DETEMIR 35 UNITS: 100 INJECTION, SOLUTION SUBCUTANEOUS at 08:12

## 2023-12-07 RX ADMIN — APIXABAN 2.5 MG: 2.5 TABLET, FILM COATED ORAL at 09:12

## 2023-12-07 RX ADMIN — AMLODIPINE BESYLATE 10 MG: 5 TABLET ORAL at 08:12

## 2023-12-07 RX ADMIN — MEROPENEM 1 G: 1 INJECTION, POWDER, FOR SOLUTION INTRAVENOUS at 01:12

## 2023-12-07 RX ADMIN — INSULIN ASPART 4 UNITS: 100 INJECTION, SOLUTION INTRAVENOUS; SUBCUTANEOUS at 05:12

## 2023-12-07 RX ADMIN — LEVETIRACETAM 500 MG: 500 TABLET, FILM COATED ORAL at 09:12

## 2023-12-07 NOTE — CARE UPDATE
12/07/23 0813   Patient Assessment/Suction   Level of Consciousness (AVPU) alert   Respiratory Effort Normal;Unlabored   All Lung Fields Breath Sounds diminished;equal bilaterally   Rhythm/Pattern, Respiratory unlabored;pattern regular;depth regular   PRE-TX-O2   Device (Oxygen Therapy) nasal cannula   $ Is the patient on Low Flow Oxygen? Yes   Flow (L/min) 2   SpO2 97 %   Pulse Oximetry Type Intermittent   $ Pulse Oximetry - Multiple Charge Pulse Oximetry - Multiple   Pulse 100   Resp 16   Aerosol Therapy   $ Aerosol Therapy Charges Aerosol Treatment   Daily Review of Necessity (SVN) completed   Respiratory Treatment Status (SVN) given   Treatment Route (SVN) mask;oxygen   Patient Position (SVN) HOB elevated   Post Treatment Assessment (SVN) breath sounds unchanged   Signs of Intolerance (SVN) none   Respiratory Evaluation   $ Care Plan Tech Time 15 min

## 2023-12-07 NOTE — PROGRESS NOTES
Pharmacokinetic Assessment Follow Up: IV Vancomycin    Patient brief summary:  Steff Johnson is a 63 y.o. female initiated on antimicrobial therapy with IV Vancomycin for treatment of Severe Sepsis    The patient's current regimen is intermittent dosing.       Actual Body Weight = 61.8 kg (136 lb 4.3 oz).    Renal Function:   Estimated Creatinine Clearance: 53.1 mL/min (based on SCr of 0.9 mg/dL).      Vancomycin serum concentration assessment(s):    The random level was drawn correctly and can be used to guide therapy at this time. The measurement is below the desired definitive target range of 15 to 20 mcg/mL.    Vancomycin Regimen Plan:    Change regimen to Vancomycin 1250 mg IV every 24 hours with next serum trough concentration measured at 0700 prior to 4th dose on 12/9.    Drug levels (last 3 results):  Recent Labs   Lab Result Units 12/07/23  0508   Vancomycin, Random ug/mL 12.0       Pharmacy will continue to follow and monitor vancomycin.    Please contact pharmacy at extension [phone] for questions regarding this assessment.    Thank you for the consult,   Polly Quintero

## 2023-12-07 NOTE — RESPIRATORY THERAPY
12/06/23 2032   Patient Assessment/Suction   Level of Consciousness (AVPU) alert   Respiratory Effort Normal;Unlabored   Expansion/Accessory Muscles/Retractions no retractions;no use of accessory muscles   All Lung Fields Breath Sounds Anterior:;Posterior:;Lateral:;equal bilaterally;clear;diminished   Rhythm/Pattern, Respiratory depth regular;pattern regular;unlabored   Cough Frequency no cough   Skin Integrity   $ Wound Care Tech Time 15 min   Area Observed Right;Behind ear;Left;Cheek;Nares;Upper lip   Skin Appearance without discoloration   PRE-TX-O2   Device (Oxygen Therapy) nasal cannula   $ Is the patient on Low Flow Oxygen? Yes   Flow (L/min) 2   SpO2 96 %   Pulse Oximetry Type Intermittent   $ Pulse Oximetry - Multiple Charge Pulse Oximetry - Multiple   Pulse 97   Resp 16   Aerosol Therapy   $ Aerosol Therapy Charges Aerosol Treatment   Daily Review of Necessity (SVN) completed   Respiratory Treatment Status (SVN) given   Treatment Route (SVN) oxygen   Patient Position (SVN) HOB elevated   Post Treatment Assessment (SVN) breath sounds improved   Signs of Intolerance (SVN) none   Breath Sounds Post-Respiratory Treatment   Throughout All Fields Post-Treatment All Fields   Throughout All Fields Post-Treatment aeration increased   Post-treatment Heart Rate (beats/min) 96   Post-treatment Resp Rate (breaths/min) 18   Education   $ Education Bronchodilator;15 min

## 2023-12-07 NOTE — PLAN OF CARE
Problem: Adult Inpatient Plan of Care  Goal: Plan of Care Review  Outcome: Ongoing, Progressing  Goal: Patient-Specific Goal (Individualized)  Outcome: Ongoing, Progressing  Goal: Absence of Hospital-Acquired Illness or Injury  Outcome: Ongoing, Progressing  Goal: Optimal Comfort and Wellbeing  Outcome: Ongoing, Progressing  Goal: Readiness for Transition of Care  Outcome: Ongoing, Progressing     Problem: Diabetes Comorbidity  Goal: Blood Glucose Level Within Targeted Range  Outcome: Ongoing, Progressing     Problem: Adjustment to Illness (Sepsis/Septic Shock)  Goal: Optimal Coping  Outcome: Ongoing, Progressing     Problem: Bleeding (Sepsis/Septic Shock)  Goal: Absence of Bleeding  Outcome: Ongoing, Progressing     Problem: Glycemic Control Impaired (Sepsis/Septic Shock)  Goal: Blood Glucose Level Within Desired Range  Outcome: Ongoing, Progressing     Problem: Infection Progression (Sepsis/Septic Shock)  Goal: Absence of Infection Signs and Symptoms  Outcome: Ongoing, Progressing     Problem: Nutrition Impaired (Sepsis/Septic Shock)  Goal: Optimal Nutrition Intake  Outcome: Ongoing, Progressing     Problem: Fluid and Electrolyte Imbalance (Acute Kidney Injury/Impairment)  Goal: Fluid and Electrolyte Balance  Outcome: Ongoing, Progressing     Problem: Oral Intake Inadequate (Acute Kidney Injury/Impairment)  Goal: Optimal Nutrition Intake  Outcome: Ongoing, Progressing     Problem: Renal Function Impairment (Acute Kidney Injury/Impairment)  Goal: Effective Renal Function  Outcome: Ongoing, Progressing     Problem: Infection  Goal: Absence of Infection Signs and Symptoms  Outcome: Ongoing, Progressing     Problem: Impaired Wound Healing  Goal: Optimal Wound Healing  Outcome: Ongoing, Progressing     Problem: Skin Injury Risk Increased  Goal: Skin Health and Integrity  Outcome: Ongoing, Progressing     Problem: Aspiration (Enteral Nutrition)  Goal: Absence of Aspiration Signs and Symptoms  Outcome: Ongoing,  Progressing     Problem: Device-Related Complication Risk (Enteral Nutrition)  Goal: Safe, Effective Therapy Delivery  Outcome: Ongoing, Progressing     Problem: Feeding Intolerance (Enteral Nutrition)  Goal: Feeding Tolerance  Outcome: Ongoing, Progressing

## 2023-12-07 NOTE — CONSULTS
Chief complaint:  Hematuria (From [facility], for blood in catheter. When ems arrived, pt on NRB, and vomited in mask. Pt is nonverbal), Emesis, and Aspiration      HPI:  Steff Johnson is a 63 y.o. female presenting with BL medial thighs blistering, a stage 4 sacral pressure ulcer and DTI of the alla-sacral area. Pt is non verbal, and noo history was from her but rather from the chart.    PMH:  As per HPI and below:  Past Medical History:   Diagnosis Date    Arthritis     Complete tear of left rotator cuff 11/09/2017    COPD (chronic obstructive pulmonary disease)     COVID-19 infection 07/02/2021    Diabetes mellitus     H/o Cerebrovascular accident (CVA) of left pontine structure 12/12/2019    Hypertension     Personal history of colonic polyps     Stroke     Wears glasses        Social History     Socioeconomic History    Marital status: Single   Occupational History     Comment: disabled due to shoulder problems   Tobacco Use    Smoking status: Never    Smokeless tobacco: Never   Substance and Sexual Activity    Alcohol use: No    Drug use: No    Sexual activity: Not Currently     Social Determinants of Health     Financial Resource Strain: Low Risk  (12/6/2023)    Overall Financial Resource Strain (CARDIA)     Difficulty of Paying Living Expenses: Not hard at all   Food Insecurity: No Food Insecurity (12/6/2023)    Hunger Vital Sign     Worried About Running Out of Food in the Last Year: Never true     Ran Out of Food in the Last Year: Never true   Transportation Needs: No Transportation Needs (12/6/2023)    PRAPARE - Transportation     Lack of Transportation (Medical): No     Lack of Transportation (Non-Medical): No   Physical Activity: Sufficiently Active (12/6/2023)    Exercise Vital Sign     Days of Exercise per Week: 5 days     Minutes of Exercise per Session: 150+ min   Stress: No Stress Concern Present (12/6/2023)    [employer] - Occupational Stress Questionnaire      Feeling of Stress : Only a little   Social Connections: Moderately Isolated (12/6/2023)    Social Connection and Isolation Panel [NHANES]     Frequency of Communication with Friends and Family: Once a week     Frequency of Social Gatherings with Friends and Family: Once a week     Attends Rastafarian Services: More than 4 times per year     Active Member of Clubs or Organizations: No     Attends Club or Organization Meetings: Never     Marital Status: Living with partner   Housing Stability: Low Risk  (12/6/2023)    Housing Stability Vital Sign     Unable to Pay for Housing in the Last Year: No     Number of Places Lived in the Last Year: 2     Unstable Housing in the Last Year: No       Past Surgical History:   Procedure Laterality Date    COLONOSCOPY N/A 4/11/2017    Procedure: COLONOSCOPY;  Surgeon: Jared Antonio MD;  Location: [facility] ENDO;  Service: Endoscopy;  Laterality: N/A;    HYSTERECTOMY      OOPHORECTOMY      SHOULDER SURGERY      R    TRANSESOPHAGEAL ECHOCARDIOGRAM WITH POSSIBLE CARDIOVERSION (GT W/ POSS CARDIOVERSION) N/A 5/4/2023    Procedure: Transesophageal echo (GT) intra-procedure log documentation;  Surgeon: Blade Phillip MD;  Location[facility] CATH/EP LAB;  Service: Cardiology;  Laterality: N/A;       Family History   Problem Relation Age of Onset    Diabetes Mother     Asthma Mother     Hypertension Mother     Diabetes Father     Diabetes Brother     Hypertension Brother     Anemia Daughter     Glaucoma Neg Hx     Macular degeneration Neg Hx     Retinal detachment Neg Hx        Review of patient's allergies indicates:  No Known Allergies    No current facility-administered medications on file prior to encounter.     Current Outpatient Medications on File Prior to Encounter   Medication Sig Dispense Refill    amLODIPine (NORVASC) 10 MG tablet Take 1 tablet (10 mg total) by mouth once daily. (Patient taking differently: 10 mg by Per G Tube route once daily.) 90 tablet 3    apixaban (ELIQUIS)  2.5 mg Tab 2.5 mg by Per G Tube route 2 (two) times daily.      aspirin 81 MG Chew Take 1 tablet (81 mg total) by mouth once daily. (Patient taking differently: 81 mg by Per G Tube route once daily.) 30 tablet 0    atorvastatin (LIPITOR) 80 MG tablet Take 1 tablet (80 mg total) by mouth every evening. (Patient taking differently: 80 mg by Per G Tube route every evening.) 30 tablet 0    folic acid (FOLVITE) 1 MG tablet 1 tablet by Per G Tube route every morning.      furosemide (LASIX) 40 MG tablet 40 mg by Per G Tube route once daily.      insulin (LANTUS SOLOSTAR U-100 INSULIN) glargine 100 units/mL SubQ pen Inject 35 Units into the skin once daily.      insulin aspart U-100 (NOVOLOG) 100 unit/mL injection Inject 4 Units into the skin As instructed for High Blood Sugar (Four times daily per sliding scale). 0 -150 = 0 units  151 - 200 = 4 units  201 - 250 = 8 units  251 - 300 = 10 units  301 - 350 = 12 units  351 - 400 = 16 units  401 - 1000 = 20 units call MD      latanoprost 0.005 % ophthalmic solution Place 1 drop into both eyes once daily. (Patient taking differently: Place 1 drop into both eyes every evening.) 7.5 mL 6    levETIRAcetam (KEPPRA) 100 mg/mL Soln 500 mg by Per G Tube route 2 (two) times daily.      losartan (COZAAR) 100 MG tablet Take 1 tablet (100 mg total) by mouth once daily. (Patient taking differently: 100 mg by Per G Tube route once daily.) 90 tablet 3    magnesium oxide (MAG-OX) 400 mg (241.3 mg magnesium) tablet 400 mg by Per G Tube route once daily.      metFORMIN (GLUCOPHAGE) 500 MG tablet 1,000 mg by Per G Tube route 2 (two) times daily with meals.      metoprolol tartrate (LOPRESSOR) 25 MG tablet 25 mg by Per G Tube route 2 (two) times daily.      multivitamin (THERAGRAN) per tablet 1 tablet by Per G Tube route once daily.      polyethylene glycol (GLYCOLAX) 17 gram PwPk 17 g by Per G Tube route once daily.      thiamine (VITAMIN B-1) 100 MG tablet 100 mg by Per G Tube route once daily.  "     baclofen (LIORESAL) 5 mg Tab tablet 5 mg by Per G Tube route every 8 (eight) hours as needed (muscle spasms).      cloNIDine (CATAPRES) 0.1 MG tablet Take 1 tablet (0.1 mg total) by mouth 3 (three) times daily. (Patient taking differently: Take 0.1 mg by mouth 4 (four) times daily as needed (For systolic over 160).) 90 tablet 11    metoprolol succinate (TOPROL-XL) 50 MG 24 hr tablet Take 1 tablet (50 mg total) by mouth once daily. 30 tablet 0    minoxidiL (LONITEN) 10 MG Tab Take 10 mg by mouth 2 (two) times daily.      tiotropium (SPIRIVA) 18 mcg inhalation capsule Inhale 1 capsule (18 mcg total) into the lungs once daily. Controller 30 capsule 0    [DISCONTINUED] blood-glucose meter (TRUE METRIX GLUCOSE METER) kit Use as instructed 1 each 0    [DISCONTINUED] lancing device (TRUEDRAW LANCING DEVICE) Misc Inject 1 Device into the skin 2 (two) times daily. 1 each 3       ROS: As per HPI and below:  Review of systems not obtained due to patient factors.      Physical Exam:     Vitals:    23 1300 23 1500 23 1940 23   BP: 120/60  (!) 140/88    Pulse:   98 97   Resp: 20  16 16   Temp: 98.2 °F (36.8 °C)  (!) 100.4 °F (38 °C)    TempSrc: Axillary      SpO2: 95%  99% 96%   Weight:  61.8 kg (136 lb 4.3 oz)     Height:           BP  Min: 101/59  Max: 157/75  Temp  Av.9 °F (37.7 °C)  Min: 98.2 °F (36.8 °C)  Max: 100.4 °F (38 °C)  Pulse  Av.4  Min: 79  Max: 112  Resp  Av.5  Min: 15  Max: 30  SpO2  Av.6 %  Min: 89 %  Max: 100 %  Height  Av' 11" (149.9 cm)  Min: 4' 11" (149.9 cm)  Max: 4' 11" (149.9 cm)  Weight  Av kg (130 lb 1.3 oz)  Min: 56.2 kg (123 lb 14.4 oz)  Max: 61.8 kg (136 lb 4.3 oz)    Body mass index is 27.52 kg/m².          General:             Well developed, well nourished, no apparent distress  HEENT:              NCAT, no JVD, mucous membranes moist, EOM intact  Cardiovascular:  Regular rate and rhythm, normal S1, normal S2, No murmurs, rubs, or " gallops  Respiratory:        Normal breath sounds, no wheezes, no rales, no rhonchi  Abdomen:           Bowel sounds present, non tender, non distended, no masses, no hepatojugular reflux  Extremities:        No clubbing, no cyanosis, no edema  Vascular:            2+ b/l radial.  Peripheral pulses intact.  No carotid bruits.  Neurological:      No focal deficits  Skin:                   No obvious rashes or erythema, BL medial thighs blisters, stage 4 sacral pressure ulcer with alla-sacral DTI's                    Lab Results   Component Value Date    WBC 39.98 (HH) 12/06/2023    HGB 7.9 (L) 12/06/2023    HCT 23.4 (L) 12/06/2023    MCV 76 (L) 12/06/2023     (H) 12/06/2023     Lab Results   Component Value Date    CHOL 92 (L) 08/04/2023    CHOL 157 05/02/2023    CHOL 156 02/22/2023     Lab Results   Component Value Date    HDL 38 (L) 08/04/2023    HDL 73 05/02/2023    HDL 56 02/22/2023     Lab Results   Component Value Date    LDLCALC 41.6 (L) 08/04/2023    LDLCALC 65.4 05/02/2023    LDLCALC 85.6 02/22/2023     Lab Results   Component Value Date    TRIG 62 08/04/2023    TRIG 93 05/02/2023    TRIG 72 02/22/2023     Lab Results   Component Value Date    CHOLHDL 41.3 08/04/2023    CHOLHDL 46.5 05/02/2023    CHOLHDL 35.9 02/22/2023     CMP  Recent Labs   Lab 12/06/23  0526   *   CALCIUM 8.4*   *   K 4.4   CO2 28   CL 99   BUN 61*   CREATININE 1.5*      Lab Results   Component Value Date    TSH 1.908 10/24/2023           Assessment and Recommendations       Diagnoses:    1. BL medial thighs blisters  2. Stage 4 sacral pressure ulcer  3. DTI of the alla-sacral area    All wounds POA    Plan:  Triad + mepilex to the BL medial thighs  Pack sacral wound with silver alginate rope    Complexity:    moderate

## 2023-12-07 NOTE — NURSING
Nurses Note -- 4 Eyes      12/6/2023   6:34 PM      Skin assessed during: Admit      [] No Altered Skin Integrity Present    []Prevention Measures Documented      [] Yes- Altered Skin Integrity Present or Discovered   [] LDA Added if Not in Epic (Describe Wound)   [] New Altered Skin Integrity was Present on Admit and Documented in LDA   [x] Wound Image Taken    Wound Care Consulted? No    Attending Nurse:  Aniket Webb RN/Staff Member:  Silvana Bone

## 2023-12-07 NOTE — PROGRESS NOTES
Pharmacy Consult Discontinuation: Vancomycin    Steff Johnson [id number] is a 63 y.o. female was consulted for vancomycin pharmacotherapy management by pharmacy.    Pharmacy consult for vancomycin dosing is no longer required.  Vancomycin was discontinued 12/07/2023.    Thank you for allowing us to participate in this patient's care. Should you have any questions or concerns please feel free to contact the pharmacy department at [phone].    Santos Quintero, PharmD

## 2023-12-07 NOTE — PROGRESS NOTES
[facility] Medicine  Progress Note    Patient name: Steff Johnson  MRN: [id number]  Admit Date: 12/5/2023   LOS: 2 days     SUBJECTIVE:     Principal problem: Severe sepsis    HPI:  Patient is a 63-year-old female with history of severe CVA with aphasia, upper and lower extremity weakness, bed-bound, cognitive impairment, hypertension, hyperlipidemia, type 2 diabetes, COPD presents to the emergency room for concerns of hematuria and hypoxia.  Patient nonverbal at baseline, history provided by ED physician.  Tried to contact daughter, was unsuccessful.  Per ED, patient presented from [facility], staff noticed bloody urine output from her chronic indwelling Mathur catheter as well as hypoxia.  Non-rebreather was placed on patient and this improved her oxygenation saturation.  Brought to ED by EMS.  Per ED physician patient did have episode of emesis, likely aspirating.     On admission temp 100.4, heart rate 111, O2 89%, WBC 40.96, hemoglobin 9.6, potassium 5.2, creatinine 1.7, lactic 3.8, procalcitonin 133.671.  Review of CXR did not show any obvious consolidations.  UA consistent with UTI.      Interval History:      12/7- she is still running low grade temp overnight, wbc improving but still elevated at 24. Continue IV abx.   Wound and ID following    Scheduled Meds:   amLODIPine  10 mg Oral Daily    apixaban  2.5 mg Per G Tube BID    aspirin  81 mg Per G Tube Daily    atorvastatin  80 mg Per G Tube QHS    famotidine  20 mg Oral Daily    insulin detemir U-100  35 Units Subcutaneous Daily    ipratropium  0.5 mg Nebulization Q6H    levETIRAcetam  500 mg Oral BID    meropenem (MERREM) IVPB  1 g Intravenous Q12H    metoprolol tartrate  25 mg Per G Tube BID    mupirocin   Nasal BID    polyethylene glycol  17 g Per G Tube Daily    vancomycin (VANCOCIN) IV (PEDS and ADULTS)  1,250 mg Intravenous Q24H     Continuous Infusions:   lactated ringers 125 mL/hr at 12/06/23 2036     PRN  Meds:acetaminophen, albuterol-ipratropium, dextrose 50%, dextrose 50%, glucagon (human recombinant), glucose, glucose, insulin aspart U-100, magnesium oxide, magnesium oxide, ondansetron, potassium bicarbonate, potassium bicarbonate, potassium bicarbonate, potassium, sodium phosphates, potassium, sodium phosphates, potassium, sodium phosphates, sodium chloride 0.9%, Pharmacy to dose Vancomycin consult **AND** vancomycin - pharmacy to dose    Review of patient's allergies indicates:  No Known Allergies    Review of Systems: As per interval history    OBJECTIVE:     Vital Signs (Most Recent)  Temp: 97.7 °F (36.5 °C) (12/07/23 0724)  Pulse: 100 (12/07/23 0813)  Resp: 16 (12/07/23 0813)  BP: (!) 165/86 (notified nurse Aniket) (12/07/23 0724)  SpO2: 97 % (12/07/23 0813)    Vital Signs Range (Last 24H):  Temp:  [97.7 °F (36.5 °C)-100.4 °F (38 °C)]   Pulse:  []   Resp:  [16-21]   BP: (120-165)/(60-88)   SpO2:  [95 %-100 %]     I & O (Last 24H):  Intake/Output Summary (Last 24 hours) at 12/7/2023 0829  Last data filed at 12/7/2023 0447  Gross per 24 hour   Intake 1029.07 ml   Output 5200 ml   Net -4170.93 ml       Physical Exam:    Gen- non-verbal, min responsive.  Eyes open, doesn't track  CV- RRR  Pulm- even respirations, no tachypnea, accessory use.    GI- soft, non-tender.  PEG LLQ  - deferred  Skin- warm, dry.        Laboratory:  I have reviewed all pertinent lab results within the past 24 hours.      ASSESSMENT/PLAN:         Active Hospital Problems    Diagnosis  POA    *Severe sepsis [A41.9, R65.20]  Yes    Leucocytosis [D72.829]  Yes    UTI (urinary tract infection) [N39.0]  Yes    Hyperkalemia [E87.5]  Yes    Pressure injury of sacral region, unstageable [L89.150]  Yes    Aphasia [R47.01]  Yes    Cognitive communication deficit [R41.841]  Yes    Combined forms of age-related cataract, bilateral [H25.813]  Yes    COURTNEY (acute kidney injury) [N17.9]  Yes    CVA (cerebral vascular accident) [I63.9]  Yes    Mixed  hyperlipidemia [E78.2]  Yes    Hyperglycemia due to type 2 diabetes mellitus [E11.65]  Yes    Hypertension associated with diabetes [E11.59, I15.2]  Yes      Resolved Hospital Problems   No resolved problems to display.         Plan:     Severe sepsis, improved  Repeat lactic down to 1.2  Need CT abdomen/Pelvis once COURTNEY has been resolved   Continue empiric abx     CVA (cerebral vascular accident)  History of CVA, nonverbal and bed-bound at baseline  Has PEG tube, nutrition consulted for tube feedings.    Has chronic indwelling catheter, replaced in the emergency room.        Hyperkalemia  This patient has hyperkalemia which is uncontrolled. We will monitor for arrhythmias with EKG or continuous telemetry. We will treat the hyperkalemia with IV fluids, follow up repeat BMP. The likely etiology of the hyperkalemia is COURTNEY.  The patients latest potassium has been reviewed and the results are listed below      Recent Labs   Lab 12/05/23  2134   K 5.2*               UTI (urinary tract infection)  Follow up urine cultures   Continue with empiric IV antibiotics   Follow up CBC and CMP        Pressure injury of sacral region, unstageable  Wound care consulted        Aphasia  Noted        Cognitive communication deficit  Chronic issue        Combined forms of age-related cataract, bilateral  Aware        COURTNEY (acute kidney injury)  Patient with acute kidney injury/acute renal failure likely due to pre-renal azotemia due to dehydration COURTNEY is currently stable. Baseline creatinine  0.6  - Labs reviewed- Renal function/electrolytes with Estimated Creatinine Clearance: 25.4 mL/min (A) (based on SCr of 1.7 mg/dL (H)). according to latest data. Monitor urine output and serial BMP and adjust therapy as needed. Avoid nephrotoxins and renally dose meds for GFR listed above.     Continue IV fluids        Mixed hyperlipidemia  Continue outpatient statin        Hyperglycemia due to type 2 diabetes mellitus  Continue Levemir, started  patient on sliding scale        Hypertension associated with diabetes  Continue outpatient antihypertensives, monitor blood pressure    VTE Risk Mitigation (From admission, onward)           Ordered     apixaban tablet 2.5 mg  2 times daily         12/05/23 2300     IP VTE HIGH RISK PATIENT  Once         12/05/23 2300     Place sequential compression device  Until discontinued         12/05/23 2300                        Department Hospital Medicine  Formerly [facility], [facility]  Yazan Denise MD  Date of service: 12/07/2023

## 2023-12-07 NOTE — PROGRESS NOTES
[facility]  Department of Infectious Disease  Progress Note        PATIENT NAME: Steff Johnson  MRN: [id number]  TODAY'S DATE: 12/07/2023  ADMIT DATE: 12/5/2023  LENGTH OF STAY: 2 DAYS    PRINCIPLE PROBLEM: Severe sepsis    REASON FOR CONSULT:  Gram negative sepsis     INTERVAL HISTORY      12/7@1246 (Denette):  Patient seen and examined in her room.  She was lying in bed and opens eyes to voice and initially makes eye contact then turns away.  She does not follow commands or move any extremities spontaneously.  T-max 100.4° at 7:00 p.m. yesterday.  WBC decreased to 24.48 from 30/9 0.98, yesterday 37% bands, none today, + left shift, BUN/CR improving at 40/0.9.  , lactic acid decreased to 1.2, procalcitonin 133.  MRSA screen was negative, vanc level 12.0.  Respiratory virus panel negative, 12/6 blood cultures negative, sputum culture not obtain, 12/5 blood cultures with 4 4 bottles positive for Proteus species.  Urine culture with Gram-negative sheryl non lactose .  Urine  Antibiotics (From admission, onward)      Start     Stop Route Frequency Ordered    12/06/23 1300  meropenem 1 g in sodium chloride 0.9 % 100 mL IVPB (ready to mix system)        Note to Pharmacy: Original order: meropenem 1 g Q 8 hours    -- IV Every 12 hours (non-standard times) 12/06/23 1214    12/06/23 0900  mupirocin 2 % ointment         12/11/23 0859 Nasl 2 times daily 12/05/23 2308          ASSESSMENT and PLAN     1. Sepsis with bacteremia with BioFire positive for Proteus species ESBL gene present   -12/5 blood cultures x2 sets with 4 of 4 bottles positive for Proteus species, ESBL gene +  -12/6 blood cultures x2 sets ngtd, pending final  -T-max 100.4°, 37% bands, WBC 40.96-->39.98-->24.48  -Procalcitonin 133.671, , serum lactate 3.8-->3.0-->  -given fluid bolus, started on Zosyn and vancomycin, change to meropenem after blood cultures positive, HR and BP stable     2. Impaired cognition and mobility s/p  CVA living in SNF with chronic indwelling Del Rosario catheter and decubitus ulcer  -Urine likely source, del rosario changed, decubitus appears clean, seen by wound care,      3. Acute respiratory failure with hypoxia with possible aspiration pneumonia, CXR no infiltrates?              -new oxygen requirement, chest x-ray read as no acute infiltrates, scarring or atelectasis in the left mid lung  -CT chest with patch really nodular opacities of the right upper lobe possibly aspiration pneumonitis and right middle and lower lobe with pneumonia and/or atelectasis  -sat 99% on 1 L nasal cannula     4. Acute kidney injury with creatinine clearance 28.8   -Creat 1.7, baseline 0.6, renal ultrasound with probable mild right hydronephrosis no stones; she was seen by Urology on last admission for mild right hydroureteronephrosis possibly due to neurogenic bladder and reflex versus obstruction from poorly compliant bladder - 10/24 urine culture with Enterococcus faecalis 50k-99k CFU/ml treated with amoxicillin  -Much improved - CrCl 53.1  -CT abdomen and pelvis with shotty retroperitoneal lymph nodes, 1 mm calcification in distal left ureter, cholelithiasis  -5.2 L urine output documented from yesterday?     5. Chronic medical problems including diabetes mellitus, hypertension,  and COPD           RECOMMENDATIONS:  Stop vancomycin  Continue meropenem 1 g IV every 12 hours   Trend WBC, inflammatory markers  Adjust antibiotic therapy based on culture results  Wound care to all affected areas  Aspiration precautions   Monitor kidney function   Follow cultures      D/W Dr Cochran, Reviewed micro, labs and diagnostics, prescription drug management       Thank you for this consult. Please send Epic secure chat with any questions.    SUBJECTIVE    Review of Systems  Unable to obtain    OBJECTIVE   Temp:  [97.7 °F (36.5 °C)-100.4 °F (38 °C)] 98.6 °F (37 °C)  Pulse:  [] 85  Resp:  [16-18] 16  SpO2:  [96 %-99 %] 99 %  BP: (140-165)/(73-88)  145/81  Temp:  [97.7 °F (36.5 °C)-100.4 °F (38 °C)]   Temp: 98.6 °F (37 °C) (12/07/23 1200)  Pulse: 85 (12/07/23 1350)  Resp: 16 (12/07/23 1350)  BP: (!) 145/81 (12/07/23 1200)  SpO2: 99 % (12/07/23 1350)    Intake/Output Summary (Last 24 hours) at 12/7/2023 1404  Last data filed at 12/7/2023 1333  Gross per 24 hour   Intake 1029.07 ml   Output 5200 ml   Net -4170.93 ml        Physical Exam  General:  Opens eyes to voice, seems to track initially but does not follow commands or move any extremities spontaneously.  Eyes: Eyes with no icterus or injection.  No exudates.  Ears:  She responds to voice.  Nose: Nares patent  Mouth:  Mucous membranes appear dry with exudates on lips, patient would not open her mouth.  Neck: No tenderness to palpation.  Cardiovascular: Regular rate and rhythm, no murmurs, mild pedal edema.  Respiratory:  Clear to auscultation bilaterally, no tachypnea or increased work of breathing.  She is on nasal cannula O2.  No coughing noted.  Gastrointestinal:  Soft and obese with active bowel sounds, no tenderness to palpation, no distention.  Peg tube with tube feeding infusing.  Site with some crusting around bumper.  Genitourinary:  Urinary catheter with clear yellow urine, no sediment.  Musculoskeletal:  No spontaneous movement, positioned on right side propped up on pillows, heel protectors in place.  Skin: Warm and dry, no obvious rashes. No surrounding erythema. No odor drainage. Left thigh blister popped, rt thigh blister intact.   Neuro:  No sounds, no tracking, eyes open to voice, initially makes eye contact but won't hold gaze.   Psych:  No agitation.  VAD: PIV left upper extremity  Isolation: Contact for ESBL    WOUNDS  12/6:                 Significant Labs: All pertinent labs within the past 24 hours have been reviewed.    CBC LAST 7 DAYS  Recent Labs   Lab 12/05/23 2028 12/05/23  2134 12/06/23  0526 12/07/23  0508   WBC  --  40.96* 39.98* 24.48*   RBC  --  3.74* 3.08* 3.04*   HGB  --   "9.6* 7.9* 7.9*   HCT 27* 28.7* 23.4* 23.0*   MCV  --  77* 76* 76*   MCH  --  25.7* 25.6* 26.0*   MCHC  --  33.4 33.8 34.3   RDW  --  21.8* 21.4* 21.5*   PLT  --  577* 492* 474*   MPV  --  10.4 10.7 10.3   GRAN  --  69.0 49.0 87.7*  21.4*   LYMPH  --  5.0* 5.0* 5.3*  1.3   MONO  --  5.0 5.0 4.6  1.1*   BASO  --   --   --  0.08   NRBC  --  0 0 0       CHEMISTRY LAST 7 DAYS  Recent Labs   Lab 12/05/23 2028 12/05/23 2134 12/06/23 0526 12/07/23  0508   NA  --  135* 135* 139   K  --  5.2* 4.4 3.9   CL  --  94* 99 104   CO2  --  28 28 25   ANIONGAP  --  13 8 10   BUN  --  67* 61* 40*   CREATININE  --  1.7* 1.5* 0.9   GLU  --  221* 216* 186*   CALCIUM  --  9.6 8.4* 9.2   PH 7.540*  --   --   --    MG  --   --  2.2 2.0   ALBUMIN  --  3.3*  --   --    PROT  --  7.4  --   --    ALKPHOS  --  98  --   --    ALT  --  30  --   --    AST  --  35  --   --    BILITOT  --  0.5  --   --        Estimated Creatinine Clearance: 53.1 mL/min (based on SCr of 0.9 mg/dL).    INFLAMMATORY/PROCAL  LAST 7 DAYS  Recent Labs   Lab 12/05/23 2134 12/06/23 0526   PROCAL 133.671*  --    CRP  --  293.0*     No results found for: "ESR"  CRP   Date Value Ref Range Status   12/06/2023 293.0 (H) 0.0 - 8.2 mg/L Final   10/25/2023 92.6 (H) 0.0 - 8.2 mg/L Final   06/15/2023 0.65 <0.76 mg/dL Final   11/23/2021 3.21 (H) <0.76 mg/dL Final   07/04/2021 0.34 <0.76 mg/dL Final   07/03/2021 0.87 (H) <0.76 mg/dL Final   07/02/2021 1.97 (H) <0.76 mg/dL Final       PRIOR  MICROBIOLOGY:    Susceptibility data from last 90 days.  Collected Specimen Info Organism Ampicillin Nitrofurantoin Tetracycline Vancomycin   12/05/23 Blood Proteus species       12/05/23 Blood Proteus species       12/05/23 Urine Nonfermentative       10/24/23 Urine from Clean Catch Enterococcus faecalis  S  S  S  S   10/24/23 Blood from Antecubital, Right No growth after 5 days.       10/24/23 Blood from Antecubital, Left No growth after 5 days.             LAST 7 DAYS MICROBIOLOGY "   Microbiology Results (last 7 days)       Procedure Component Value Units Date/Time    Urine culture [[id number]]  (Abnormal) Collected: 12/05/23 2003    Order Status: Completed Specimen: Urine Updated: 12/07/23 0859     Urine Culture, Routine GRAM NEGATIVE MARLEEN, NON-LACTOSE   >100,000 cfu/ml  Identification and susceptibility pending      Narrative:      Specimen Source->Urine    Blood culture x two cultures. Draw prior to antibiotics. [[id number]]  (Abnormal) Collected: 12/05/23 2017    Order Status: Completed Specimen: Blood Updated: 12/07/23 0748     Blood Culture, Routine Gram stain lisa bottle: Gram negative rods      Gram stain aer bottle: Gram negative rods      Results called to and read back by:Natalie Dugan RN  12/06/2023      10:39 JBM      Positive results previously called      PROTEUS SPECIES  For susceptibility see order #[id number]      Narrative:      Aerobic and anaerobic  Collection has been rescheduled by [facility] at 12/05/2023 19:29 Reason:   MelFilesX,ER tech said to come back in a minute. Patient needs to be   cleaned.  Collection has been rescheduled by JS at 12/05/2023 20:20 Reason:   Done  Collection has been rescheduled by JS at 12/05/2023 19:29 Reason:   Meline,ER tech said to come back in a minute. Patient needs to be   cleaned.  Collection has been rescheduled by JS at 12/05/2023 20:20 Reason:   Done    Blood culture x two cultures. Draw prior to antibiotics. [[id number]]  (Abnormal) Collected: 12/05/23 2007    Order Status: Completed Specimen: Blood Updated: 12/07/23 0747     Blood Culture, Routine Gram stain lisa bottle: Gram negative rods      Gram stain aer bottle: Gram negative rods      Results called to and read back by:Natalie Dugan RN  12/06/2023      10:39 JBM      Positive results previously called      PROTEUS SPECIES  Identification and susceptibility pending      Narrative:      Aerobic and anaerobic  Collection has been rescheduled by JS at 12/05/2023  19:29 Reason:   KOLBY Connell said to come back in a minute. Patient needs to be   cleaned.  Collection has been rescheduled by JS at 12/05/2023 20:20 Reason:   Done  Collection has been rescheduled by JS at 12/05/2023 19:29 Reason:   Meline,ER tech said to come back in a minute. Patient needs to be   cleaned.  Collection has been rescheduled by JS at 12/05/2023 20:20 Reason:   Done    Blood culture [[id number]] Collected: 12/06/23 1522    Order Status: Completed Specimen: Blood from Antecubital, Right Hand Updated: 12/06/23 2317     Blood Culture, Routine No Growth to date    Narrative:      Collection has been rescheduled by CH10 at 12/06/2023 13:17 Reason:   Duplicate order waiting for dr lott to cancel. Spoke to rob RN.  Collection has been rescheduled by 10 at 12/06/2023 13:17 Reason:   Duplicate order waiting for dr lott to cancel. Spoke to rob RN.    Blood culture [[id number]] Collected: 12/06/23 1521    Order Status: Completed Specimen: Blood from Peripheral, Right Arm Updated: 12/06/23 2317     Blood Culture, Routine No Growth to date    Narrative:      Collection has been rescheduled by CH10 at 12/06/2023 13:17 Reason:   Duplicate order waiting for dr lott to cancel. Spoke to rob RN.  Collection has been rescheduled by 10 at 12/06/2023 13:17 Reason:   Duplicate order waiting for dr lott to cancel. Spoke to rob RN.    MRSA Screen by PCR [[id number]] Collected: 12/06/23 1645    Order Status: Completed Specimen: Nasopharyngeal Swab from Nasal Updated: 12/06/23 2239     MRSA SCREEN BY PCR Negative    Respiratory Infection Panel (PCR), Nasopharyngeal [[id number]] Collected: 12/06/23 1645    Order Status: Completed Specimen: Nasopharyngeal Swab Updated: 12/06/23 1905     Respiratory Infection Panel Source NP swab     Adenovirus Not Detected     Coronavirus 229E, Common Cold Virus Not Detected     Coronavirus HKU1, Common Cold Virus Not Detected     Coronavirus NL63, Common Cold Virus Not Detected      Coronavirus OC43, Common Cold Virus Not Detected     Comment: Coronavirus strains 229E, HKU1, NL63, and OC43 can cause the common   cold   and are not associated with the respiratory disease outbreak caused   by  the COVID-19 (SARS-CoV-2 novel Coronavirus) strain.           SARS-CoV2 (COVID-19) Qualitative PCR Not Detected     Human Metapneumovirus Not Detected     Human Rhinovirus/Enterovirus Not Detected     Influenza A (subtypes H1, H1-2009,H3) Not Detected     Influenza B Not Detected     Parainfluenza Virus 1 Not Detected     Parainfluenza Virus 2 Not Detected     Parainfluenza Virus 3 Not Detected     Parainfluenza Virus 4 Not Detected     Respiratory Syncytial Virus Not Detected     Bordetella Parapertussis (ON1823) Not Detected     Bordetella pertussis (ptxP) Not Detected     Chlamydia pneumoniae Not Detected     Mycoplasma pneumoniae Not Detected     Comment: Respiratory Infection Panel testing performed by Multiplex PCR.       Narrative:      Respiratory Infection Panel source->NP Swab    Culture, Respiratory with Gram Stain [[id number]]     Order Status: Sent Specimen: Sputum, Induced     Rapid Organism ID by PCR (from Blood culture) [[id number]]  (Abnormal) Collected: 12/05/23 2007    Order Status: Completed Updated: 12/06/23 1151     Enterococcus faecalis Not Detected     Enterococcus faecium Not Detected     Listeria monocytogenes Not Detected     Staphylococcus spp. Not Detected     Staphylococcus aureus Not Detected     Staphylococcus epidermidis Not Detected     Staphylococcus lugdunensis Not Detected     Streptococcus species Not Detected     Streptococcus agalactiae Not Detected     Streptococcus pneumoniae Not Detected     Streptococcus pyogenes Not Detected     Acinetobacter calcoaceticus/baumannii complex Not Detected     Bacteroides fragilis Not Detected     Enterobacterales See species for ID     Comment: critical result(s) called and verbal readback obtained from VERONICA AndersonED by  CD3 12/06/2023 11:51          Enterobacter cloacae complex Not Detected     Escherichia coli Not Detected     Klebsiella aerogenes Not Detected     Klebsiella oxytoca Not Detected     Klebsiella pneumoniae group Not Detected     Proteus Detected     Comment: critical result(s) called and verbal readback obtained from VERONICA AndersonED by CD3 12/06/2023 11:51          Salmonella sp Not Detected     Serratia marcescens Not Detected     Haemophilus influenzae Not Detected     Neisseria meningtidis Not Detected     Pseudomonas aeruginosa Not Detected     Stenotrophomonas maltophilia Not Detected     Candida albicans Not Detected     Candida auris Not Detected     Candida glabrata Not Detected     Candida krusei Not Detected     Candida parapsilosis Not Detected     Candida tropicalis Not Detected     Cryptococcus neoformans/gattii Not Detected     CTX-M (ESBL ) Detected     Comment: critical result(s) called and verbal readback obtained from VERONICA AndersonED by CD3 12/06/2023 11:51          IMP (Carbapenem resistant) Not Detected     KPC resistance gene (Carbapenem resistant) Not Detected     mcr-1  Test not applicable     mec A/C  Test not applicable     mec A/C and MREJ (MRSA) gene Test not applicable     NDM (Carbapenem resistant) Not Detected     OXA-48-like (Carbapenem resistant) Not Detected     van A/B (VRE gene) Test not applicable     VIM (Carbapenem resistant) Not Detected    Narrative:      Aerobic and anaerobic     critical result(s) called and verbal readback obtained from VERONICA AndersonED by CD3 12/06/2023 11:51              CURRENT/PREVIOUS VISIT EKG  Results for orders placed or performed during the hospital encounter of 12/05/23   EKG 12-lead    Collection Time: 12/05/23  7:35 PM    Narrative    Test Reason : R00.0,    Vent. Rate : 112 BPM     Atrial Rate : 112 BPM     P-R Int : 132 ms          QRS Dur : 066 ms      QT Int : 340 ms       P-R-T Axes : 066 051 066 degrees     QTc Int : 464  ms    Sinus tachycardia  Otherwise normal ECG  When compared with ECG of 24-OCT-2023 14:57,  No significant change was found    Referred By: [email or web address]   SELF           Confirmed By:          Significant Imaging: I have reviewed all relevant and available imaging results/findings within the past 24 hours.    US Retroperitoneal Complete 12/06/23 0123   Limited evaluation. Probable mild right hydronephrosis.     X-Ray Chest AP Portable 12/05/23 1939   IMPRESSION: No acute pulmonary process    CT Chest Without Contrast 12/06/23 1817   Patchy nodular opacities involving the right upper lobe with surrounding groundglass attenuation, concerning for infectious/inflammatory process including aspiration pneumonitis.   Airspace consolidation with volume loss right middle lobe and right lower lobe, likely representing a combination of pneumonia and atelectasis.    CT Renal Stone Study Abd Pelvis WO 12/06/23 1816   Negative for obstructive uropathy.   Probable 1 mm punctate calculus within the distal left ureter.   Cholelithiasis.   Additional incidental findings as described above      Roberta Samaniego NP  Date of Service: 12/07/2023  2:04 PM

## 2023-12-07 NOTE — RESPIRATORY THERAPY
02 saturation 96% on nc at 2lpm.  Patient receiving aerosol tx vi a mash with 0.5mg atrovent now and q6hr

## 2023-12-08 PROBLEM — Z16.12 ESBL (EXTENDED SPECTRUM BETA-LACTAMASE) PRODUCING BACTERIA INFECTION: Status: ACTIVE | Noted: 2023-12-08

## 2023-12-08 PROBLEM — A49.9 ESBL (EXTENDED SPECTRUM BETA-LACTAMASE) PRODUCING BACTERIA INFECTION: Status: ACTIVE | Noted: 2023-12-08

## 2023-12-08 LAB
ANION GAP SERPL CALC-SCNC: 7 MMOL/L (ref 8–16)
BACTERIA BLD CULT: ABNORMAL
BACTERIA UR CULT: ABNORMAL
BASOPHILS # BLD AUTO: 0.08 K/UL (ref 0–0.2)
BASOPHILS NFR BLD: 0.4 % (ref 0–1.9)
BUN SERPL-MCNC: 32 MG/DL (ref 8–23)
CALCIUM SERPL-MCNC: 9.1 MG/DL (ref 8.7–10.5)
CHLORIDE SERPL-SCNC: 108 MMOL/L (ref 95–110)
CO2 SERPL-SCNC: 27 MMOL/L (ref 23–29)
CREAT SERPL-MCNC: 0.7 MG/DL (ref 0.5–1.4)
DIFFERENTIAL METHOD BLD: ABNORMAL
EOSINOPHIL # BLD AUTO: 0.3 K/UL (ref 0–0.5)
EOSINOPHIL NFR BLD: 1.5 % (ref 0–8)
ERYTHROCYTE [DISTWIDTH] IN BLOOD BY AUTOMATED COUNT: 22 % (ref 11.5–14.5)
EST. GFR  (NO RACE VARIABLE): >60 ML/MIN/1.73 M^2
GLUCOSE SERPL-MCNC: 120 MG/DL (ref 70–110)
GLUCOSE SERPL-MCNC: 129 MG/DL (ref 70–110)
GLUCOSE SERPL-MCNC: 154 MG/DL (ref 70–110)
GLUCOSE SERPL-MCNC: 165 MG/DL (ref 70–110)
GLUCOSE SERPL-MCNC: 204 MG/DL (ref 70–110)
HCT VFR BLD AUTO: 21.8 % (ref 37–48.5)
HGB BLD-MCNC: 7.1 G/DL (ref 12–16)
IMM GRANULOCYTES # BLD AUTO: 0.5 K/UL (ref 0–0.04)
IMM GRANULOCYTES NFR BLD AUTO: 2.2 % (ref 0–0.5)
LYMPHOCYTES # BLD AUTO: 2.3 K/UL (ref 1–4.8)
LYMPHOCYTES NFR BLD: 10.2 % (ref 18–48)
MAGNESIUM SERPL-MCNC: 1.8 MG/DL (ref 1.6–2.6)
MCH RBC QN AUTO: 25.2 PG (ref 27–31)
MCHC RBC AUTO-ENTMCNC: 32.6 G/DL (ref 32–36)
MCV RBC AUTO: 77 FL (ref 82–98)
MONOCYTES # BLD AUTO: 1.9 K/UL (ref 0.3–1)
MONOCYTES NFR BLD: 8.2 % (ref 4–15)
NEUTROPHILS # BLD AUTO: 17.7 K/UL (ref 1.8–7.7)
NEUTROPHILS NFR BLD: 77.5 % (ref 38–73)
NRBC BLD-RTO: 0 /100 WBC
PHOSPHATE SERPL-MCNC: 2.2 MG/DL (ref 2.7–4.5)
PLATELET # BLD AUTO: 421 K/UL (ref 150–450)
PMV BLD AUTO: 10.9 FL (ref 9.2–12.9)
POTASSIUM SERPL-SCNC: 3.8 MMOL/L (ref 3.5–5.1)
RBC # BLD AUTO: 2.82 M/UL (ref 4–5.4)
SODIUM SERPL-SCNC: 142 MMOL/L (ref 136–145)
WBC # BLD AUTO: 22.83 K/UL (ref 3.9–12.7)

## 2023-12-08 PROCEDURE — 87040 BLOOD CULTURE FOR BACTERIA: CPT | Mod: 59 | Performed by: NURSE PRACTITIONER

## 2023-12-08 PROCEDURE — 87154 CUL TYP ID BLD PTHGN 6+ TRGT: CPT | Performed by: NURSE PRACTITIONER

## 2023-12-08 PROCEDURE — 36415 COLL VENOUS BLD VENIPUNCTURE: CPT | Performed by: NURSE PRACTITIONER

## 2023-12-08 PROCEDURE — 12000002 HC ACUTE/MED SURGE SEMI-PRIVATE ROOM

## 2023-12-08 PROCEDURE — 63600175 PHARM REV CODE 636 W HCPCS: Performed by: NURSE PRACTITIONER

## 2023-12-08 PROCEDURE — 99900035 HC TECH TIME PER 15 MIN (STAT)

## 2023-12-08 PROCEDURE — 25000003 PHARM REV CODE 250: Performed by: STUDENT IN AN ORGANIZED HEALTH CARE EDUCATION/TRAINING PROGRAM

## 2023-12-08 PROCEDURE — 94640 AIRWAY INHALATION TREATMENT: CPT

## 2023-12-08 PROCEDURE — 63600175 PHARM REV CODE 636 W HCPCS: Performed by: STUDENT IN AN ORGANIZED HEALTH CARE EDUCATION/TRAINING PROGRAM

## 2023-12-08 PROCEDURE — 83735 ASSAY OF MAGNESIUM: CPT | Performed by: STUDENT IN AN ORGANIZED HEALTH CARE EDUCATION/TRAINING PROGRAM

## 2023-12-08 PROCEDURE — 85025 COMPLETE CBC W/AUTO DIFF WBC: CPT | Performed by: STUDENT IN AN ORGANIZED HEALTH CARE EDUCATION/TRAINING PROGRAM

## 2023-12-08 PROCEDURE — 99900031 HC PATIENT EDUCATION (STAT)

## 2023-12-08 PROCEDURE — 25000003 PHARM REV CODE 250: Performed by: INTERNAL MEDICINE

## 2023-12-08 PROCEDURE — 87186 SC STD MICRODIL/AGAR DIL: CPT | Performed by: NURSE PRACTITIONER

## 2023-12-08 PROCEDURE — 36415 COLL VENOUS BLD VENIPUNCTURE: CPT | Performed by: STUDENT IN AN ORGANIZED HEALTH CARE EDUCATION/TRAINING PROGRAM

## 2023-12-08 PROCEDURE — 27000221 HC OXYGEN, UP TO 24 HOURS

## 2023-12-08 PROCEDURE — 84100 ASSAY OF PHOSPHORUS: CPT | Performed by: INTERNAL MEDICINE

## 2023-12-08 PROCEDURE — 80048 BASIC METABOLIC PNL TOTAL CA: CPT | Performed by: STUDENT IN AN ORGANIZED HEALTH CARE EDUCATION/TRAINING PROGRAM

## 2023-12-08 PROCEDURE — 94799 UNLISTED PULMONARY SVC/PX: CPT

## 2023-12-08 PROCEDURE — 63600175 PHARM REV CODE 636 W HCPCS: Performed by: INTERNAL MEDICINE

## 2023-12-08 PROCEDURE — 94761 N-INVAS EAR/PLS OXIMETRY MLT: CPT

## 2023-12-08 PROCEDURE — 99233 SBSQ HOSP IP/OBS HIGH 50: CPT | Mod: FS,,, | Performed by: NURSE PRACTITIONER

## 2023-12-08 PROCEDURE — 25000242 PHARM REV CODE 250 ALT 637 W/ HCPCS: Performed by: STUDENT IN AN ORGANIZED HEALTH CARE EDUCATION/TRAINING PROGRAM

## 2023-12-08 PROCEDURE — 87077 CULTURE AEROBIC IDENTIFY: CPT | Performed by: NURSE PRACTITIONER

## 2023-12-08 RX ORDER — LEVETIRACETAM 100 MG/ML
500 SOLUTION ORAL 2 TIMES DAILY
Status: DISCONTINUED | OUTPATIENT
Start: 2023-12-08 | End: 2023-12-19 | Stop reason: HOSPADM

## 2023-12-08 RX ADMIN — IPRATROPIUM BROMIDE 0.5 MG: 0.5 SOLUTION RESPIRATORY (INHALATION) at 12:12

## 2023-12-08 RX ADMIN — ASPIRIN 81 MG 81 MG: 81 TABLET ORAL at 09:12

## 2023-12-08 RX ADMIN — MEROPENEM 1 G: 1 INJECTION, POWDER, FOR SOLUTION INTRAVENOUS at 12:12

## 2023-12-08 RX ADMIN — AMLODIPINE BESYLATE 10 MG: 5 TABLET ORAL at 09:12

## 2023-12-08 RX ADMIN — INSULIN ASPART 2 UNITS: 100 INJECTION, SOLUTION INTRAVENOUS; SUBCUTANEOUS at 01:12

## 2023-12-08 RX ADMIN — MUPIROCIN 1 G: 20 OINTMENT TOPICAL at 08:12

## 2023-12-08 RX ADMIN — IPRATROPIUM BROMIDE 0.5 MG: 0.5 SOLUTION RESPIRATORY (INHALATION) at 01:12

## 2023-12-08 RX ADMIN — APIXABAN 2.5 MG: 2.5 TABLET, FILM COATED ORAL at 08:12

## 2023-12-08 RX ADMIN — INSULIN ASPART 2 UNITS: 100 INJECTION, SOLUTION INTRAVENOUS; SUBCUTANEOUS at 09:12

## 2023-12-08 RX ADMIN — MUPIROCIN 1 G: 20 OINTMENT TOPICAL at 09:12

## 2023-12-08 RX ADMIN — LEVETIRACETAM 500 MG: 100 SOLUTION ORAL at 11:12

## 2023-12-08 RX ADMIN — FAMOTIDINE 20 MG: 20 TABLET ORAL at 09:12

## 2023-12-08 RX ADMIN — SODIUM CHLORIDE, SODIUM LACTATE, POTASSIUM CHLORIDE, AND CALCIUM CHLORIDE: .6; .31; .03; .02 INJECTION, SOLUTION INTRAVENOUS at 12:12

## 2023-12-08 RX ADMIN — METOPROLOL TARTRATE 25 MG: 25 TABLET, FILM COATED ORAL at 08:12

## 2023-12-08 RX ADMIN — MEROPENEM 1 G: 1 INJECTION, POWDER, FOR SOLUTION INTRAVENOUS at 08:12

## 2023-12-08 RX ADMIN — IPRATROPIUM BROMIDE 0.5 MG: 0.5 SOLUTION RESPIRATORY (INHALATION) at 07:12

## 2023-12-08 RX ADMIN — METOPROLOL TARTRATE 25 MG: 25 TABLET, FILM COATED ORAL at 09:12

## 2023-12-08 RX ADMIN — INSULIN DETEMIR 35 UNITS: 100 INJECTION, SOLUTION SUBCUTANEOUS at 09:12

## 2023-12-08 RX ADMIN — MEROPENEM 1 G: 1 INJECTION, POWDER, FOR SOLUTION INTRAVENOUS at 01:12

## 2023-12-08 RX ADMIN — ATORVASTATIN CALCIUM 80 MG: 40 TABLET, FILM COATED ORAL at 08:12

## 2023-12-08 RX ADMIN — IPRATROPIUM BROMIDE 0.5 MG: 0.5 SOLUTION RESPIRATORY (INHALATION) at 08:12

## 2023-12-08 RX ADMIN — INSULIN ASPART 2 UNITS: 100 INJECTION, SOLUTION INTRAVENOUS; SUBCUTANEOUS at 05:12

## 2023-12-08 RX ADMIN — POLYETHYLENE GLYCOL 3350 17 G: 17 POWDER, FOR SOLUTION ORAL at 09:12

## 2023-12-08 RX ADMIN — LEVETIRACETAM 500 MG: 100 SOLUTION ORAL at 08:12

## 2023-12-08 RX ADMIN — APIXABAN 2.5 MG: 2.5 TABLET, FILM COATED ORAL at 09:12

## 2023-12-08 NOTE — PROGRESS NOTES
[facility]  Department of Infectious Disease  Progress Note        PATIENT NAME: Steff Johnson  MRN: [id number]  TODAY'S DATE: 12/08/2023  ADMIT DATE: 12/5/2023  LENGTH OF STAY: 3 DAYS    PRINCIPLE PROBLEM: Severe sepsis    REASON FOR CONSULT:  Gram negative sepsis     INTERVAL HISTORY      12/8 @4389 (Danette):  Patient seen and examined in her room.  She is unresponsive with her eyes open.  T-max 100.4° in the last 24 hours.  WBC 22.83 trending down, bands mild left shift, no bands.  H&H 7.1/29.8 and platelets were 21, BUN/CR 32/0.7.  Urine output 1.7 L yesterday.  12/6 blood cultures x2 sets with 1/4 bottles positive for Proteus mirabilis ESBL.  12/8 blood cultures pending.  She continues on meropenem.    12/7@8427 (Danette):  Patient seen and examined in her room.  She was lying in bed and opens eyes to voice and initially makes eye contact then turns away.  She does not follow commands or move any extremities spontaneously.  T-max 100.4° at 7:00 p.m. yesterday.  WBC decreased to 24.48 from 30/9 0.98, yesterday 37% bands, none today, + left shift, BUN/CR improving at 40/0.9.  , lactic acid decreased to 1.2, procalcitonin 133.  MRSA screen was negative, vanc level 12.0.  Respiratory virus panel negative, 12/6 blood cultures negative, sputum culture not obtain, 12/5 blood cultures with 4 4 bottles positive for Proteus species.  Urine culture with Gram-negative sheryl non lactose .  Urine  Antibiotics (From admission, onward)      Start     Stop Route Frequency Ordered    12/08/23 2115  meropenem 1 g in sodium chloride 0.9 % 100 mL IVPB (ready to mix system)        Note to Pharmacy: Original order: meropenem 1 g Q 8 hours    -- IV Every 8 hours (non-standard times) 12/08/23 1627    12/06/23 0900  mupirocin 2 % ointment         12/11/23 0859 Nasl 2 times daily 12/05/23 7733          ASSESSMENT and PLAN     1. Sepsis with bacteremia with BioFire positive for Proteus species ESBL gene  present   -12/5 blood cultures x2 sets with 4 of 4 bottles positive for Proteus mirabilis ESBL  -12/6 blood cultures x2 sets 1/4 bottles positive for Proteus mirabilis ESBL   -12/8 blood cultures x2 sets drawn this morning  -T-max 100.4°, 37% bands, WBC 40.96-->39.98-->24.48-->22.83  -Procalcitonin 133.671,   -serum lactate 3.8-->3.0-->1.2  - started on Zosyn and vancomycin, change to meropenem after blood cultures positive, HR and BP stable     2. Impaired cognition and mobility s/p CVA living in SNF with chronic indwelling Del Rosario catheter and decubitus ulcer  -Urine likely source, del rosario changed, decubitus appears clean, seen by wound care,      3. Acute respiratory failure with hypoxia with possible aspiration pneumonia, CXR no infiltrates?              -new oxygen requirement, chest x-ray read as no acute infiltrates, scarring or atelectasis in the left mid lung  -CT chest with patch really nodular opacities of the right upper lobe possibly aspiration pneumonitis and right middle and lower lobe with pneumonia and/or atelectasis  -sat 99% on 1 L nasal cannula - weaning     4. Acute kidney injury with creatinine clearance 28.8   -Creat 1.7, baseline 0.6, renal ultrasound with probable mild right hydronephrosis no stones; she was seen by Urology on last admission for mild right hydroureteronephrosis possibly due to neurogenic bladder and reflex versus obstruction from poorly compliant bladder - 10/24 urine culture with Enterococcus faecalis 50k-99k CFU/ml treated with amoxicillin  -Much improved - CrCl 68.3  -CT abdomen and pelvis with shotty retroperitoneal lymph nodes, 1 mm calcification in distal left ureter, cholelithiasis  -Good urine output, creatinine normalized  -urology has no Interventional recommendations     5. Chronic medical problems including diabetes mellitus, hypertension,  and COPD           RECOMMENDATIONS:  Continue meropenem but increased to 1 g IV every 8 hours for ESBL Proteus  mirabilis  Trend WBC, inflammatory markers - CRP and procalcitonin in a.m.  Wound care to all affected areas  Aspiration precautions   Monitor kidney function   Followed blood cultures, positive on 12/5 and 12/6      D/W Dr Cochran, Reviewed micro, labs and diagnostics, prescription drug management, ordered labs      Thank you for this consult. Please send Relive secure chat with any questions.    SUBJECTIVE    Review of Systems  Unable to obtain    OBJECTIVE   Temp:  [98.7 °F (37.1 °C)-100.4 °F (38 °C)] 99.5 °F (37.5 °C)  Pulse:  [] 86  Resp:  [16-20] 16  SpO2:  [95 %-98 %] 95 %  BP: (150-177)/(74-83) 177/83  Temp:  [98.7 °F (37.1 °C)-100.4 °F (38 °C)]   Temp: 99.5 °F (37.5 °C) (12/08/23 1100)  Pulse: 86 (12/08/23 1311)  Resp: 16 (12/08/23 1311)  BP: (!) 177/83 (notified nurse) (12/08/23 1100)  SpO2: 95 % (12/08/23 1352)    Intake/Output Summary (Last 24 hours) at 12/8/2023 1533  Last data filed at 12/8/2023 1320  Gross per 24 hour   Intake 1271.15 ml   Output 2950 ml   Net -1678.85 ml          Physical Exam  General:  Eyes are open, does not pay attention.  Eyes: Eyes with no icterus or injection.  No exudates.  No tracking.  Ears:  Turns had to her name  Nose: Nares patent  Mouth:  Unable to examine mouth.  Neck: No tenderness to palpation.  Cardiovascular: Regular rate and rhythm, no murmurs, mild pedal edema.  Respiratory:  Clear to auscultation bilaterally, no tachypnea or increased work of breathing.  She is on nasal cannula O2.    Gastrointestinal:  Soft and obese with active bowel sounds, no tenderness to palpation, no distention.  Peg tube with tube feeding infusing.  Site with some crusting around bumper.  Place drain sponges under bumper.  Genitourinary:  Urinary catheter with clear yellow urine, no sediment.  Musculoskeletal:  No spontaneous movement, , heel protectors in place.  Skin: Warm and dry, no obvious rashes.   Neuro:  No sounds, no tracking, eyes open spontaneously, initially makes eye  "contact but won't hold gaze.   Psych:  No agitation.  VAD: PIV left upper extremity  Isolation: Contact for ESBL    WOUNDS  12/6:                 Significant Labs: All pertinent labs within the past 24 hours have been reviewed.    CBC LAST 7 DAYS  Recent Labs   Lab 12/05/23 2028 12/05/23 2134 12/06/23 0526 12/07/23  0508 12/08/23  0544   WBC  --  40.96* 39.98* 24.48* 22.83*   RBC  --  3.74* 3.08* 3.04* 2.82*   HGB  --  9.6* 7.9* 7.9* 7.1*   HCT 27* 28.7* 23.4* 23.0* 21.8*   MCV  --  77* 76* 76* 77*   MCH  --  25.7* 25.6* 26.0* 25.2*   MCHC  --  33.4 33.8 34.3 32.6   RDW  --  21.8* 21.4* 21.5* 22.0*   PLT  --  577* 492* 474* 421   MPV  --  10.4 10.7 10.3 10.9   GRAN  --  69.0 49.0 87.7*  21.4* 77.5*  17.7*   LYMPH  --  5.0* 5.0* 5.3*  1.3 10.2*  2.3   MONO  --  5.0 5.0 4.6  1.1* 8.2  1.9*   BASO  --   --   --  0.08 0.08   NRBC  --  0 0 0 0         CHEMISTRY LAST 7 DAYS  Recent Labs   Lab 12/05/23 2028 12/05/23 2134 12/06/23  0526 12/07/23  0508 12/08/23  0544   NA  --  135* 135* 139 142   K  --  5.2* 4.4 3.9 3.8   CL  --  94* 99 104 108   CO2  --  28 28 25 27   ANIONGAP  --  13 8 10 7*   BUN  --  67* 61* 40* 32*   CREATININE  --  1.7* 1.5* 0.9 0.7   GLU  --  221* 216* 186* 120*   CALCIUM  --  9.6 8.4* 9.2 9.1   PH 7.540*  --   --   --   --    MG  --   --  2.2 2.0 1.8   ALBUMIN  --  3.3*  --   --   --    PROT  --  7.4  --   --   --    ALKPHOS  --  98  --   --   --    ALT  --  30  --   --   --    AST  --  35  --   --   --    BILITOT  --  0.5  --   --   --          Estimated Creatinine Clearance: 68.3 mL/min (based on SCr of 0.7 mg/dL).    INFLAMMATORY/PROCAL  LAST 7 DAYS  Recent Labs   Lab 12/05/23 2134 12/06/23  0526   PROCAL 133.671*  --    CRP  --  293.0*       No results found for: "ESR"  CRP   Date Value Ref Range Status   12/06/2023 293.0 (H) 0.0 - 8.2 mg/L Final   10/25/2023 92.6 (H) 0.0 - 8.2 mg/L Final   06/15/2023 0.65 <0.76 mg/dL Final   11/23/2021 3.21 (H) <0.76 mg/dL Final   07/04/2021 0.34 " <0.76 mg/dL Final   07/03/2021 0.87 (H) <0.76 mg/dL Final   07/02/2021 1.97 (H) <0.76 mg/dL Final       PRIOR  MICROBIOLOGY:    Susceptibility data from last 90 days.  Collected Specimen Info Organism Amp/Sulbactam Ampicillin Cefazolin Cefepime Ceftriaxone Ciprofloxacin Ertapenem Gentamicin Levofloxacin Meropenem Nitrofurantoin PIPERACILLIN/TAZO SUSCEPTIBILITY Tetracycline Tobramycin   12/06/23 Blood from Antecubital, Right Hand Proteus mirabilis ESBL                 12/05/23 Blood Proteus mirabilis ESBL                 12/05/23 Blood Proteus mirabilis ESBL  S  R  R  R  R  R  S  S  R  S   S  R  S   12/05/23 Urine Proteus mirabilis ESBL  S  R  R  R  R  R  S  S  R  S  R  S  R  S   10/24/23 Urine from Clean Catch Enterococcus faecalis   S          S   S    10/24/23 Blood from Antecubital, Right No growth after 5 days.                 10/24/23 Blood from Antecubital, Left No growth after 5 days.                   Collected Specimen Info Organism Trimeth/Sulfa Vancomycin   12/06/23 Blood from Antecubital, Right Hand Proteus mirabilis ESBL     12/05/23 Blood Proteus mirabilis ESBL     12/05/23 Blood Proteus mirabilis ESBL  R    12/05/23 Urine Proteus mirabilis ESBL  R    10/24/23 Urine from Clean Catch Enterococcus faecalis   S   10/24/23 Blood from Antecubital, Right No growth after 5 days.     10/24/23 Blood from Antecubital, Left No growth after 5 days.             LAST 7 DAYS MICROBIOLOGY   Microbiology Results (last 7 days)       Procedure Component Value Units Date/Time    Blood culture [[id number]] Collected: 12/08/23 1208    Order Status: Sent Specimen: Blood Updated: 12/08/23 1219    Blood culture [[id number]] Collected: 12/08/23 1208    Order Status: Sent Specimen: Blood Updated: 12/08/23 1219    Urine culture [[id number]]  (Abnormal)  (Susceptibility) Collected: 12/05/23 2003    Order Status: Completed Specimen: Urine Updated: 12/08/23 0757     Urine Culture, Routine PROTEUS MIRABILIS ESBL  >100,000  cfu/ml  Known ESBL patient      Narrative:      Specimen Source->Urine    Blood culture [[id number]]  (Abnormal) Collected: 12/06/23 1522    Order Status: Completed Specimen: Blood from Antecubital, Right Hand Updated: 12/08/23 0738     Blood Culture, Routine Gram stain aer bottle: Gram negative rods      Positive results previously called 12/07/2023  18:19 KS3      PROTEUS MIRABILIS ESBL  Known ESBL patient  For susceptibility see order #[id number]      Narrative:      Collection has been rescheduled by [facility] at 12/06/2023 13:17 Reason:   Duplicate order waiting for dr lott to cancel. Spoke to rob GARCIA.  Collection has been rescheduled by [facility] at 12/06/2023 13:17 Reason:   Duplicate order waiting for dr lott to cancel. Spoke to rob GARCIA.    Blood culture x two cultures. Draw prior to antibiotics. [[id number]]  (Abnormal) Collected: 12/05/23 2017    Order Status: Completed Specimen: Blood Updated: 12/08/23 0736     Blood Culture, Routine Gram stain lisa bottle: Gram negative rods      Gram stain aer bottle: Gram negative rods      Results called to and read back by:Rob Dugan RN  12/06/2023      10:39 JBM      Positive results previously called      PROTEUS MIRABILIS ESBL  Known ESBL patient  For susceptibility see order #[id number]      Narrative:      Aerobic and anaerobic  Collection has been rescheduled by [facility] at 12/05/2023 19:29 Reason:   Meline,ER tech said to come back in a minute. Patient needs to be   cleaned.  Collection has been rescheduled by JS at 12/05/2023 20:20 Reason:   Done  Collection has been rescheduled by [facility] at 12/05/2023 19:29 Reason:   Meline,ER tech said to come back in a minute. Patient needs to be   cleaned.  Collection has been rescheduled by [facility] at 12/05/2023 20:20 Reason:   Done    Blood culture x two cultures. Draw prior to antibiotics. [[id number]]  (Abnormal)  (Susceptibility) Collected: 12/05/23 2007    Order Status: Completed Specimen: Blood Updated: 12/08/23 0735      Blood Culture, Routine Gram stain lisa bottle: Gram negative rods      Gram stain aer bottle: Gram negative rods      Results called to and read back by:Rob Dugan RN  12/06/2023      10:39 JBM      Positive results previously called      PROTEUS MIRABILIS ESBL  Known ESBL patient      Narrative:      Aerobic and anaerobic  Collection has been rescheduled by [facility] at 12/05/2023 19:29 Reason:   [facility],ER tech said to come back in a minute. Patient needs to be   cleaned.  Collection has been rescheduled by [facility] at 12/05/2023 20:20 Reason:   Done  Collection has been rescheduled by [facility] at 12/05/2023 19:29 Reason:   Meline,ER tech said to come back in a minute. Patient needs to be   cleaned.  Collection has been rescheduled by [facility] at 12/05/2023 20:20 Reason:   Done    Blood culture [[id number]] Collected: 12/06/23 1521    Order Status: Completed Specimen: Blood from Peripheral, Right Arm Updated: 12/07/23 1832     Blood Culture, Routine No Growth to date      No Growth to date    Narrative:      Collection has been rescheduled by [facility] at 12/06/2023 13:17 Reason:   Duplicate order waiting for dr lott to cancel. Spoke to rob GARCIA.  Collection has been rescheduled by [facility] at 12/06/2023 13:17 Reason:   Duplicate order waiting for dr lott to cancel. Spoke to rob GARCIA.    MRSA Screen by PCR [[id number]] Collected: 12/06/23 1645    Order Status: Completed Specimen: Nasopharyngeal Swab from Nasal Updated: 12/06/23 2239     MRSA SCREEN BY PCR Negative    Respiratory Infection Panel (PCR), Nasopharyngeal [[id number]] Collected: 12/06/23 1645    Order Status: Completed Specimen: Nasopharyngeal Swab Updated: 12/06/23 1905     Respiratory Infection Panel Source NP swab     Adenovirus Not Detected     Coronavirus 229E, Common Cold Virus Not Detected     Coronavirus HKU1, Common Cold Virus Not Detected     Coronavirus NL63, Common Cold Virus Not Detected     Coronavirus OC43, Common Cold Virus Not Detected     Comment:  Coronavirus strains 229E, HKU1, NL63, and OC43 can cause the common   cold   and are not associated with the respiratory disease outbreak caused   by  the COVID-19 (SARS-CoV-2 novel Coronavirus) strain.           SARS-CoV2 (COVID-19) Qualitative PCR Not Detected     Human Metapneumovirus Not Detected     Human Rhinovirus/Enterovirus Not Detected     Influenza A (subtypes H1, H1-2009,H3) Not Detected     Influenza B Not Detected     Parainfluenza Virus 1 Not Detected     Parainfluenza Virus 2 Not Detected     Parainfluenza Virus 3 Not Detected     Parainfluenza Virus 4 Not Detected     Respiratory Syncytial Virus Not Detected     Bordetella Parapertussis (CV5865) Not Detected     Bordetella pertussis (ptxP) Not Detected     Chlamydia pneumoniae Not Detected     Mycoplasma pneumoniae Not Detected     Comment: Respiratory Infection Panel testing performed by Multiplex PCR.       Narrative:      Respiratory Infection Panel source->NP Swab    Culture, Respiratory with Gram Stain [[id number]]     Order Status: Sent Specimen: Sputum, Induced     Rapid Organism ID by PCR (from Blood culture) [[id number]]  (Abnormal) Collected: 12/05/23 2007    Order Status: Completed Updated: 12/06/23 1151     Enterococcus faecalis Not Detected     Enterococcus faecium Not Detected     Listeria monocytogenes Not Detected     Staphylococcus spp. Not Detected     Staphylococcus aureus Not Detected     Staphylococcus epidermidis Not Detected     Staphylococcus lugdunensis Not Detected     Streptococcus species Not Detected     Streptococcus agalactiae Not Detected     Streptococcus pneumoniae Not Detected     Streptococcus pyogenes Not Detected     Acinetobacter calcoaceticus/baumannii complex Not Detected     Bacteroides fragilis Not Detected     Enterobacterales See species for ID     Comment: critical result(s) called and verbal readback obtained from MARIBEL Anderson by CD3 12/06/2023 11:51          Enterobacter cloacae complex Not  Detected     Escherichia coli Not Detected     Klebsiella aerogenes Not Detected     Klebsiella oxytoca Not Detected     Klebsiella pneumoniae group Not Detected     Proteus Detected     Comment: critical result(s) called and verbal readback obtained from Meghann Al RN-ED by CD3 12/06/2023 11:51          Salmonella sp Not Detected     Serratia marcescens Not Detected     Haemophilus influenzae Not Detected     Neisseria meningtidis Not Detected     Pseudomonas aeruginosa Not Detected     Stenotrophomonas maltophilia Not Detected     Candida albicans Not Detected     Candida auris Not Detected     Candida glabrata Not Detected     Candida krusei Not Detected     Candida parapsilosis Not Detected     Candida tropicalis Not Detected     Cryptococcus neoformans/gattii Not Detected     CTX-M (ESBL ) Detected     Comment: critical result(s) called and verbal readback obtained from VERONICA AndersonED by CD3 12/06/2023 11:51          IMP (Carbapenem resistant) Not Detected     KPC resistance gene (Carbapenem resistant) Not Detected     mcr-1  Test not applicable     mec A/C  Test not applicable     mec A/C and MREJ (MRSA) gene Test not applicable     NDM (Carbapenem resistant) Not Detected     OXA-48-like (Carbapenem resistant) Not Detected     van A/B (VRE gene) Test not applicable     VIM (Carbapenem resistant) Not Detected    Narrative:      Aerobic and anaerobic     critical result(s) called and verbal readback obtained from Meghann Al RN-ED by CD3 12/06/2023 11:51              CURRENT/PREVIOUS VISIT EKG  Results for orders placed or performed during the hospital encounter of 12/05/23   EKG 12-lead    Collection Time: 12/05/23  7:35 PM    Narrative    Test Reason : R00.0,    Vent. Rate : 112 BPM     Atrial Rate : 112 BPM     P-R Int : 132 ms          QRS Dur : 066 ms      QT Int : 340 ms       P-R-T Axes : 066 051 066 degrees     QTc Int : 464 ms    Sinus tachycardia  Otherwise normal ECG  When compared  with ECG of 24-OCT-2023 14:57,  No significant change was found    Referred By: [email or web address]   SELF           Confirmed By:          Significant Imaging: I have reviewed all relevant and available imaging results/findings within the past 24 hours.    US Retroperitoneal Complete 12/06/23 0123   Limited evaluation. Probable mild right hydronephrosis.     X-Ray Chest AP Portable 12/05/23 1939   IMPRESSION: No acute pulmonary process    CT Chest Without Contrast 12/06/23 1817   Patchy nodular opacities involving the right upper lobe with surrounding groundglass attenuation, concerning for infectious/inflammatory process including aspiration pneumonitis.   Airspace consolidation with volume loss right middle lobe and right lower lobe, likely representing a combination of pneumonia and atelectasis.    CT Renal Stone Study Abd Pelvis WO 12/06/23 1816   Negative for obstructive uropathy.   Probable 1 mm punctate calculus within the distal left ureter.   Cholelithiasis.   Additional incidental findings as described above      Roberta Samaniego NP  Date of Service: 12/08/2023  2:04 PM

## 2023-12-08 NOTE — CONSULTS
63-year-old aphasic female with history of strokes  Patient has cognitive impairment and is bed-bound  Admission for complex urinary tract infection  Significant improvement since admission  Leukocytosis resolving  Renal function improving    CT scan indicates no hydronephrosis  She does have a 1 mm stone in the left ureter  No indication for surgical intervention at this time    We will continue to follow along with you

## 2023-12-08 NOTE — PLAN OF CARE
12/08/23 0739   Patient Assessment/Suction   Level of Consciousness (AVPU) alert   Respiratory Effort Normal;Unlabored   Expansion/Accessory Muscles/Retractions no retractions;no use of accessory muscles   All Lung Fields Breath Sounds diminished   PRE-TX-O2   Device (Oxygen Therapy) nasal cannula   $ Is the patient on Low Flow Oxygen? Yes   Flow (L/min) 1   SpO2 98 %   Pulse Oximetry Type Intermittent   $ Pulse Oximetry - Multiple Charge Pulse Oximetry - Multiple   Pulse 89   Resp 16   Aerosol Therapy   $ Aerosol Therapy Charges Aerosol Treatment   Daily Review of Necessity (SVN) completed   Respiratory Treatment Status (SVN) given   Treatment Route (SVN) mask;oxygen   Patient Position (SVN) HOB elevated   Post Treatment Assessment (SVN) breath sounds unchanged   Signs of Intolerance (SVN) none   Breath Sounds Post-Respiratory Treatment   Post-treatment Heart Rate (beats/min) 87   Post-treatment Resp Rate (breaths/min) 16   Education   $ Education Bronchodilator;15 min

## 2023-12-08 NOTE — CARE UPDATE
12/1960   Patient Assessment/Suction   Level of Consciousness (AVPU) alert   Respiratory Effort Normal;Unlabored   Expansion/Accessory Muscles/Retractions expansion symmetric   All Lung Fields Breath Sounds Anterior:;Lateral:   JONATHON Breath Sounds clear;diminished   Rhythm/Pattern, Respiratory depth regular;pattern regular   Cough Frequency no cough   PRE-TX-O2   Device (Oxygen Therapy) nasal cannula   Flow (L/min) 1   SpO2 97 %   Pulse Oximetry Type Intermittent   $ Pulse Oximetry - Multiple Charge Pulse Oximetry - Multiple   Pulse 102   Resp 20   Positioning HOB elevated 30 degrees   Aerosol Therapy   $ Aerosol Therapy Charges Aerosol Treatment   Respiratory Treatment Status (SVN) given   Treatment Route (SVN) mask;oxygen   Patient Position (SVN) HOB elevated   Post Treatment Assessment (SVN) breath sounds unchanged   Breath Sounds Post-Respiratory Treatment   Throughout All Fields Post-Treatment All Fields   Throughout All Fields Post-Treatment no change   Post-treatment Heart Rate (beats/min) 90   Post-treatment Resp Rate (breaths/min) 18

## 2023-12-08 NOTE — PROGRESS NOTES
[facility] Medicine  Progress Note    Patient name: Steff Johnson  MRN: [id number]  Admit Date: 12/5/2023   LOS: 3 days     SUBJECTIVE:     Principal problem: Severe sepsis    HPI:  Patient is a 63-year-old female with history of severe CVA with aphasia, upper and lower extremity weakness, bed-bound, cognitive impairment, hypertension, hyperlipidemia, type 2 diabetes, COPD presents to the emergency room for concerns of hematuria and hypoxia.  Patient nonverbal at baseline, history provided by ED physician.  Tried to contact daughter, was unsuccessful.  Per ED, patient presented from [facility], staff noticed bloody urine output from her chronic indwelling Mathur catheter as well as hypoxia.  Non-rebreather was placed on patient and this improved her oxygenation saturation.  Brought to ED by EMS.  Per ED physician patient did have episode of emesis, likely aspirating.     On admission temp 100.4, heart rate 111, O2 89%, WBC 40.96, hemoglobin 9.6, potassium 5.2, creatinine 1.7, lactic 3.8, procalcitonin 133.671.  Review of CXR did not show any obvious consolidations.  UA consistent with UTI.      Interval History:      12/8- axillary temp 99.5, wbc 22, blood cx sensitivities still pending.      12/7- she is still running low grade temp overnight, wbc improving but still elevated at 24. Continue IV abx.   Wound and ID following    Scheduled Meds:   amLODIPine  10 mg Oral Daily    apixaban  2.5 mg Per G Tube BID    aspirin  81 mg Per G Tube Daily    atorvastatin  80 mg Per G Tube QHS    famotidine  20 mg Oral Daily    insulin detemir U-100  35 Units Subcutaneous Daily    ipratropium  0.5 mg Nebulization Q6H    levetiracetam  500 mg Per G Tube BID    meropenem (MERREM) IVPB  1 g Intravenous Q12H    metoprolol tartrate  25 mg Per G Tube BID    mupirocin   Nasal BID    polyethylene glycol  17 g Per G Tube Daily     Continuous Infusions:   lactated ringers 125 mL/hr at 12/08/23 0535     PRN  Meds:acetaminophen, albuterol-ipratropium, dextrose 50%, dextrose 50%, glucagon (human recombinant), glucose, glucose, insulin aspart U-100, magnesium oxide, magnesium oxide, ondansetron, potassium bicarbonate, potassium bicarbonate, potassium bicarbonate, potassium, sodium phosphates, potassium, sodium phosphates, potassium, sodium phosphates, sodium chloride 0.9%    Review of patient's allergies indicates:  No Known Allergies    Review of Systems: As per interval history    OBJECTIVE:     Vital Signs (Most Recent)  Temp: 98.7 °F (37.1 °C) (12/08/23 0701)  Pulse: 89 (12/08/23 0739)  Resp: 16 (12/08/23 0739)  BP: (!) 159/74 (notified nurse) (12/08/23 0701)  SpO2: 98 % (12/08/23 0739)    Vital Signs Range (Last 24H):  Temp:  [97.3 °F (36.3 °C)-100.4 °F (38 °C)]   Pulse:  []   Resp:  [16-20]   BP: (143-172)/(74-87)   SpO2:  [96 %-99 %]     I & O (Last 24H):  Intake/Output Summary (Last 24 hours) at 12/8/2023 1051  Last data filed at 12/8/2023 0809  Gross per 24 hour   Intake 1031.15 ml   Output 2150 ml   Net -1118.85 ml         Physical Exam:    Gen- non-verbal, min responsive.  Eyes open, doesn't track  CV- RRR  Pulm- even respirations, no tachypnea, accessory use.    GI- soft, non-tender.  PEG LLQ  - deferred  Skin- warm, dry.        Laboratory:  I have reviewed all pertinent lab results within the past 24 hours.      ASSESSMENT/PLAN:         Active Hospital Problems    Diagnosis  POA    *Severe sepsis [A41.9, R65.20]  Yes    Acute respiratory failure with hypoxia [J96.01]  Unknown    Bacteremia due to Proteus species [R78.81, B96.4]  Unknown    Bedridden [Z74.01]  Not Applicable    Blisters of multiple sites [R23.8]  Unknown    Chronic indwelling Mathur catheter [Z97.8]  Not Applicable    Cognitive impairment [R41.89]  Unknown    Hydronephrosis [N13.30]  Unknown    Hypoxia [R09.02]  Unknown    Pressure injury of sacral region, stage 3 [L89.153]  Unknown    Aspiration pneumonia of right lung due to vomit  [J69.0]  Unknown    Leucocytosis [D72.829]  Yes    UTI (urinary tract infection) [N39.0]  Yes    Hyperkalemia [E87.5]  Yes    Pressure injury of sacral region, unstageable [L89.150]  Yes    Aphasia [R47.01]  Yes    Cognitive communication deficit [R41.841]  Yes    Combined forms of age-related cataract, bilateral [H25.813]  Yes    COURTNEY (acute kidney injury) [N17.9]  Yes    CVA (cerebral vascular accident) [I63.9]  Yes    Mixed hyperlipidemia [E78.2]  Yes    Hyperglycemia due to type 2 diabetes mellitus [E11.65]  Yes    Hypertension associated with diabetes [E11.59, I15.2]  Yes      Resolved Hospital Problems   No resolved problems to display.         Plan:     Severe sepsis, improved  Repeat lactic down to 1.2  Continue empiric abx  Follow up cx s/s     CVA (cerebral vascular accident)  History of CVA, nonverbal and bed-bound at baseline  Has PEG tube, nutrition consulted for tube feedings.    Has chronic indwelling catheter, replaced in the emergency room.        Hyperkalemia  This patient has hyperkalemia which is uncontrolled. We will monitor for arrhythmias with EKG or continuous telemetry. We will treat the hyperkalemia with IV fluids, follow up repeat BMP. The likely etiology of the hyperkalemia is COURTNEY.  The patients latest potassium has been reviewed and the results are listed below      Recent Labs   Lab 12/05/23  2134   K 5.2*               UTI (urinary tract infection)  Continue with empiric IV antibiotics   Follow up CBC and CMP        Pressure injury of sacral region, unstageable  Wound care consulted        Aphasia  Noted        Cognitive communication deficit  Chronic issue         COURTNEY (acute kidney injury)  Patient with acute kidney injury/acute renal failure likely due to pre-renal azotemia due to dehydration COURTNEY is currently stable. Baseline creatinine  0.6  - Labs reviewed- Renal function/electrolytes with Estimated Creatinine Clearance: 25.4 mL/min (A) (based on SCr of 1.7 mg/dL (H)). according to  latest data. Monitor urine output and serial BMP and adjust therapy as needed. Avoid nephrotoxins and renally dose meds for GFR listed above.     Continue IV fluids        Mixed hyperlipidemia  Continue outpatient statin        Hyperglycemia due to type 2 diabetes mellitus  Continue Levemir, started patient on sliding scale        Hypertension associated with diabetes  Continue outpatient antihypertensives, monitor blood pressure    VTE Risk Mitigation (From admission, onward)           Ordered     apixaban tablet 2.5 mg  2 times daily         12/05/23 2300     IP VTE HIGH RISK PATIENT  Once         12/05/23 2300     Place sequential compression device  Until discontinued         12/05/23 2300                        Department Hospital Medicine  Formerly [facility], [facility]–1983  Yazan Denise MD  Date of service: 12/08/2023

## 2023-12-09 LAB
ACINETOBACTER CALCOACETICUS/BAUMANNII COMPLEX: NOT DETECTED
ANION GAP SERPL CALC-SCNC: 6 MMOL/L (ref 8–16)
ANISOCYTOSIS BLD QL SMEAR: ABNORMAL
BACTEROIDES FRAGILIS: NOT DETECTED
BASOPHILS NFR BLD: 0 % (ref 0–1.9)
BUN SERPL-MCNC: 25 MG/DL (ref 8–23)
CALCIUM SERPL-MCNC: 8.9 MG/DL (ref 8.7–10.5)
CANDIDA ALBICANS: NOT DETECTED
CANDIDA AURIS: NOT DETECTED
CANDIDA GLABRATA: NOT DETECTED
CANDIDA KRUSEI: NOT DETECTED
CANDIDA PARAPSILOSIS: NOT DETECTED
CANDIDA TROPICALIS: NOT DETECTED
CHLORIDE SERPL-SCNC: 109 MMOL/L (ref 95–110)
CO2 SERPL-SCNC: 28 MMOL/L (ref 23–29)
CREAT SERPL-MCNC: 0.6 MG/DL (ref 0.5–1.4)
CRP SERPL-MCNC: 143.3 MG/L (ref 0–8.2)
CRYPTOCOCCUS NEOFORMANS/GATTII: NOT DETECTED
CTX-M GENE (ESBL PRODUCER): ABNORMAL
DIFFERENTIAL METHOD BLD: ABNORMAL
ENTEROBACTER CLOACAE COMPLEX: NOT DETECTED
ENTEROBACTERALES: NOT DETECTED
ENTEROCOCCUS FAECALIS: DETECTED
ENTEROCOCCUS FAECIUM: NOT DETECTED
EOSINOPHIL NFR BLD: 2 % (ref 0–8)
ERYTHROCYTE [DISTWIDTH] IN BLOOD BY AUTOMATED COUNT: 22.3 % (ref 11.5–14.5)
ESCHERICHIA COLI: NOT DETECTED
EST. GFR  (NO RACE VARIABLE): >60 ML/MIN/1.73 M^2
GLUCOSE SERPL-MCNC: 110 MG/DL (ref 70–110)
GLUCOSE SERPL-MCNC: 118 MG/DL (ref 70–110)
GLUCOSE SERPL-MCNC: 119 MG/DL (ref 70–110)
GLUCOSE SERPL-MCNC: 131 MG/DL (ref 70–110)
GLUCOSE SERPL-MCNC: 152 MG/DL (ref 70–110)
HAEMOPHILUS INFLUENZAE: NOT DETECTED
HCT VFR BLD AUTO: 21.9 % (ref 37–48.5)
HGB BLD-MCNC: 7.2 G/DL (ref 12–16)
HYPOCHROMIA BLD QL SMEAR: ABNORMAL
IMM GRANULOCYTES # BLD AUTO: ABNORMAL K/UL (ref 0–0.04)
IMM GRANULOCYTES NFR BLD AUTO: ABNORMAL % (ref 0–0.5)
IMP GENE (CARBAPENEM RESISTANT): ABNORMAL
KLEBSIELLA AEROGENES: NOT DETECTED
KLEBSIELLA OXYTOCA: NOT DETECTED
KLEBSIELLA PNEUMONIAE GROUP: NOT DETECTED
KPC RESISTANCE GENE (CARBAPENEM): ABNORMAL
LISTERIA MONOCYTOGENES: NOT DETECTED
LYMPHOCYTES NFR BLD: 12 % (ref 18–48)
MAGNESIUM SERPL-MCNC: 1.8 MG/DL (ref 1.6–2.6)
MCH RBC QN AUTO: 25 PG (ref 27–31)
MCHC RBC AUTO-ENTMCNC: 32.9 G/DL (ref 32–36)
MCR-1: ABNORMAL
MCV RBC AUTO: 76 FL (ref 82–98)
MEC A/C AND MREJ (MRSA): ABNORMAL
MEC A/C: ABNORMAL
METAMYELOCYTES NFR BLD MANUAL: 1 %
MONOCYTES NFR BLD: 10 % (ref 4–15)
NDM GENE (CARBAPENEM RESISTANT): ABNORMAL
NEISSERIA MENINGITIDIS: NOT DETECTED
NEUTROPHILS NFR BLD: 72 % (ref 38–73)
NEUTS BAND NFR BLD MANUAL: 3 %
NRBC BLD-RTO: 0 /100 WBC
OXA-48-LIKE (CARBAPENEM RESISTANT): ABNORMAL
PHOSPHATE SERPL-MCNC: 2.5 MG/DL (ref 2.7–4.5)
PLATELET # BLD AUTO: 495 K/UL (ref 150–450)
PLATELET BLD QL SMEAR: ABNORMAL
PMV BLD AUTO: 10.7 FL (ref 9.2–12.9)
POTASSIUM SERPL-SCNC: 4 MMOL/L (ref 3.5–5.1)
PROCALCITONIN SERPL IA-MCNC: 17.94 NG/ML (ref 0–0.5)
PROTEUS SPECIES: NOT DETECTED
PSEUDOMONAS AERUGINOSA: NOT DETECTED
RBC # BLD AUTO: 2.88 M/UL (ref 4–5.4)
SALMONELLA SP: NOT DETECTED
SERRATIA MARCESCENS: NOT DETECTED
SODIUM SERPL-SCNC: 143 MMOL/L (ref 136–145)
STAPHYLOCOCCUS AUREUS: NOT DETECTED
STAPHYLOCOCCUS EPIDERMIDIS: NOT DETECTED
STAPHYLOCOCCUS LUGDUNESIS: NOT DETECTED
STAPHYLOCOCCUS SPECIES: NOT DETECTED
STENOTROPHOMONAS MALTOPHILIA: NOT DETECTED
STREPTOCOCCUS AGALACTIAE: NOT DETECTED
STREPTOCOCCUS PNEUMONIAE: NOT DETECTED
STREPTOCOCCUS PYOGENES: NOT DETECTED
STREPTOCOCCUS SPECIES: NOT DETECTED
VAN A/B (VRE GENE): NOT DETECTED
VIM GENE (CARBAPENEM RESISTANT): ABNORMAL
WBC # BLD AUTO: 20.79 K/UL (ref 3.9–12.7)

## 2023-12-09 PROCEDURE — 25000003 PHARM REV CODE 250: Performed by: NURSE PRACTITIONER

## 2023-12-09 PROCEDURE — 25000242 PHARM REV CODE 250 ALT 637 W/ HCPCS: Performed by: STUDENT IN AN ORGANIZED HEALTH CARE EDUCATION/TRAINING PROGRAM

## 2023-12-09 PROCEDURE — 85007 BL SMEAR W/DIFF WBC COUNT: CPT | Performed by: STUDENT IN AN ORGANIZED HEALTH CARE EDUCATION/TRAINING PROGRAM

## 2023-12-09 PROCEDURE — 94799 UNLISTED PULMONARY SVC/PX: CPT

## 2023-12-09 PROCEDURE — 63600175 PHARM REV CODE 636 W HCPCS: Performed by: NURSE PRACTITIONER

## 2023-12-09 PROCEDURE — 25000003 PHARM REV CODE 250: Performed by: STUDENT IN AN ORGANIZED HEALTH CARE EDUCATION/TRAINING PROGRAM

## 2023-12-09 PROCEDURE — 86140 C-REACTIVE PROTEIN: CPT | Performed by: NURSE PRACTITIONER

## 2023-12-09 PROCEDURE — 25000003 PHARM REV CODE 250: Performed by: INTERNAL MEDICINE

## 2023-12-09 PROCEDURE — 99233 SBSQ HOSP IP/OBS HIGH 50: CPT | Mod: ,,, | Performed by: INTERNAL MEDICINE

## 2023-12-09 PROCEDURE — 84145 PROCALCITONIN (PCT): CPT | Performed by: NURSE PRACTITIONER

## 2023-12-09 PROCEDURE — 99900035 HC TECH TIME PER 15 MIN (STAT)

## 2023-12-09 PROCEDURE — 94640 AIRWAY INHALATION TREATMENT: CPT

## 2023-12-09 PROCEDURE — 36415 COLL VENOUS BLD VENIPUNCTURE: CPT | Performed by: NURSE PRACTITIONER

## 2023-12-09 PROCEDURE — 84100 ASSAY OF PHOSPHORUS: CPT | Performed by: INTERNAL MEDICINE

## 2023-12-09 PROCEDURE — 63600175 PHARM REV CODE 636 W HCPCS: Performed by: STUDENT IN AN ORGANIZED HEALTH CARE EDUCATION/TRAINING PROGRAM

## 2023-12-09 PROCEDURE — 12000002 HC ACUTE/MED SURGE SEMI-PRIVATE ROOM

## 2023-12-09 PROCEDURE — 80048 BASIC METABOLIC PNL TOTAL CA: CPT | Performed by: STUDENT IN AN ORGANIZED HEALTH CARE EDUCATION/TRAINING PROGRAM

## 2023-12-09 PROCEDURE — 94761 N-INVAS EAR/PLS OXIMETRY MLT: CPT

## 2023-12-09 PROCEDURE — 99900031 HC PATIENT EDUCATION (STAT)

## 2023-12-09 PROCEDURE — 85027 COMPLETE CBC AUTOMATED: CPT | Performed by: STUDENT IN AN ORGANIZED HEALTH CARE EDUCATION/TRAINING PROGRAM

## 2023-12-09 PROCEDURE — 83735 ASSAY OF MAGNESIUM: CPT | Performed by: STUDENT IN AN ORGANIZED HEALTH CARE EDUCATION/TRAINING PROGRAM

## 2023-12-09 RX ORDER — VANCOMYCIN HCL IN 5 % DEXTROSE 1G/250ML
1000 PLASTIC BAG, INJECTION (ML) INTRAVENOUS
Status: DISCONTINUED | OUTPATIENT
Start: 2023-12-09 | End: 2023-12-11

## 2023-12-09 RX ORDER — TAMSULOSIN HYDROCHLORIDE 0.4 MG/1
0.4 CAPSULE ORAL DAILY
Status: DISPENSED | OUTPATIENT
Start: 2023-12-09 | End: 2023-12-19

## 2023-12-09 RX ADMIN — POLYETHYLENE GLYCOL 3350 17 G: 17 POWDER, FOR SOLUTION ORAL at 08:12

## 2023-12-09 RX ADMIN — IPRATROPIUM BROMIDE 0.5 MG: 0.5 SOLUTION RESPIRATORY (INHALATION) at 12:12

## 2023-12-09 RX ADMIN — FAMOTIDINE 20 MG: 20 TABLET ORAL at 08:12

## 2023-12-09 RX ADMIN — APIXABAN 2.5 MG: 2.5 TABLET, FILM COATED ORAL at 08:12

## 2023-12-09 RX ADMIN — IPRATROPIUM BROMIDE 0.5 MG: 0.5 SOLUTION RESPIRATORY (INHALATION) at 01:12

## 2023-12-09 RX ADMIN — LEVETIRACETAM 500 MG: 100 SOLUTION ORAL at 08:12

## 2023-12-09 RX ADMIN — ASPIRIN 81 MG 81 MG: 81 TABLET ORAL at 08:12

## 2023-12-09 RX ADMIN — LEVETIRACETAM 500 MG: 100 SOLUTION ORAL at 11:12

## 2023-12-09 RX ADMIN — APIXABAN 2.5 MG: 2.5 TABLET, FILM COATED ORAL at 11:12

## 2023-12-09 RX ADMIN — IPRATROPIUM BROMIDE 0.5 MG: 0.5 SOLUTION RESPIRATORY (INHALATION) at 08:12

## 2023-12-09 RX ADMIN — INSULIN DETEMIR 35 UNITS: 100 INJECTION, SOLUTION SUBCUTANEOUS at 08:12

## 2023-12-09 RX ADMIN — METOPROLOL TARTRATE 25 MG: 25 TABLET, FILM COATED ORAL at 08:12

## 2023-12-09 RX ADMIN — ATORVASTATIN CALCIUM 80 MG: 40 TABLET, FILM COATED ORAL at 11:12

## 2023-12-09 RX ADMIN — SODIUM CHLORIDE, SODIUM LACTATE, POTASSIUM CHLORIDE, AND CALCIUM CHLORIDE: .6; .31; .03; .02 INJECTION, SOLUTION INTRAVENOUS at 02:12

## 2023-12-09 RX ADMIN — VANCOMYCIN HYDROCHLORIDE 1000 MG: 1 INJECTION, POWDER, LYOPHILIZED, FOR SOLUTION INTRAVENOUS at 04:12

## 2023-12-09 RX ADMIN — MEROPENEM 1 G: 1 INJECTION, POWDER, FOR SOLUTION INTRAVENOUS at 11:12

## 2023-12-09 RX ADMIN — MEROPENEM 1 G: 1 INJECTION, POWDER, FOR SOLUTION INTRAVENOUS at 02:12

## 2023-12-09 RX ADMIN — MEROPENEM 1 G: 1 INJECTION, POWDER, FOR SOLUTION INTRAVENOUS at 04:12

## 2023-12-09 RX ADMIN — AMLODIPINE BESYLATE 10 MG: 5 TABLET ORAL at 08:12

## 2023-12-09 RX ADMIN — IPRATROPIUM BROMIDE 0.5 MG: 0.5 SOLUTION RESPIRATORY (INHALATION) at 07:12

## 2023-12-09 RX ADMIN — MUPIROCIN 1 G: 20 OINTMENT TOPICAL at 08:12

## 2023-12-09 RX ADMIN — METOPROLOL TARTRATE 25 MG: 25 TABLET, FILM COATED ORAL at 11:12

## 2023-12-09 NOTE — PLAN OF CARE
Problem: Adult Inpatient Plan of Care  Goal: Plan of Care Review  Outcome: Ongoing, Progressing  Goal: Patient-Specific Goal (Individualized)  Outcome: Ongoing, Progressing  Goal: Absence of Hospital-Acquired Illness or Injury  Outcome: Ongoing, Progressing  Goal: Optimal Comfort and Wellbeing  Outcome: Ongoing, Progressing  Goal: Readiness for Transition of Care  Outcome: Ongoing, Progressing     Problem: Diabetes Comorbidity  Goal: Blood Glucose Level Within Targeted Range  Outcome: Ongoing, Progressing     Problem: Adjustment to Illness (Sepsis/Septic Shock)  Goal: Optimal Coping  Outcome: Ongoing, Progressing     Problem: Bleeding (Sepsis/Septic Shock)  Goal: Absence of Bleeding  Outcome: Ongoing, Progressing     Problem: Glycemic Control Impaired (Sepsis/Septic Shock)  Goal: Blood Glucose Level Within Desired Range  Outcome: Ongoing, Progressing     Problem: Infection Progression (Sepsis/Septic Shock)  Goal: Absence of Infection Signs and Symptoms  Outcome: Ongoing, Progressing     Problem: Nutrition Impaired (Sepsis/Septic Shock)  Goal: Optimal Nutrition Intake  Outcome: Ongoing, Progressing     Problem: Fluid and Electrolyte Imbalance (Acute Kidney Injury/Impairment)  Goal: Fluid and Electrolyte Balance  Outcome: Ongoing, Progressing     Problem: Oral Intake Inadequate (Acute Kidney Injury/Impairment)  Goal: Optimal Nutrition Intake  Outcome: Ongoing, Progressing     Problem: Renal Function Impairment (Acute Kidney Injury/Impairment)  Goal: Effective Renal Function  Outcome: Ongoing, Progressing     Problem: Infection  Goal: Absence of Infection Signs and Symptoms  Outcome: Ongoing, Progressing     Problem: Impaired Wound Healing  Goal: Optimal Wound Healing  Outcome: Ongoing, Progressing     Problem: Skin Injury Risk Increased  Goal: Skin Health and Integrity  Outcome: Ongoing, Progressing     Problem: Aspiration (Enteral Nutrition)  Goal: Absence of Aspiration Signs and Symptoms  Outcome: Ongoing,  Progressing     Problem: Device-Related Complication Risk (Enteral Nutrition)  Goal: Safe, Effective Therapy Delivery  Outcome: Ongoing, Progressing     Problem: Feeding Intolerance (Enteral Nutrition)  Goal: Feeding Tolerance  Outcome: Ongoing, Progressing     Problem: Fall Injury Risk  Goal: Absence of Fall and Fall-Related Injury  Outcome: Ongoing, Progressing

## 2023-12-09 NOTE — PLAN OF CARE
12/08/23 2024   Patient Assessment/Suction   Level of Consciousness (AVPU) alert   Respiratory Effort Normal;Unlabored   Expansion/Accessory Muscles/Retractions expansion symmetric;no retractions;no use of accessory muscles   All Lung Fields Breath Sounds Anterior:;Lateral:;diminished   Rhythm/Pattern, Respiratory depth regular;pattern regular;unlabored   PRE-TX-O2   Device (Oxygen Therapy) room air   SpO2 95 %   Pulse Oximetry Type Intermittent   $ Pulse Oximetry - Multiple Charge Pulse Oximetry - Multiple   Pulse 93   Resp 16   Aerosol Therapy   $ Aerosol Therapy Charges Aerosol Treatment   Daily Review of Necessity (SVN) completed   Respiratory Treatment Status (SVN) given   Treatment Route (SVN) mask;oxygen   Patient Position (SVN) HOB elevated   Post Treatment Assessment (SVN) breath sounds unchanged   Signs of Intolerance (SVN) none   Breath Sounds Post-Respiratory Treatment   Throughout All Fields Post-Treatment All Fields   Throughout All Fields Post-Treatment no change   Post-treatment Heart Rate (beats/min) 88   Post-treatment Resp Rate (breaths/min) 18   Education   $ Education Bronchodilator;15 min   Respiratory Evaluation   $ Care Plan Tech Time 15 min   $ Eval/Re-eval Charges Re-evaluation

## 2023-12-09 NOTE — PLAN OF CARE
Spoke to Kwabena Toledo Supervisor at [facility].  He will speak to admissions team and find out if patient can return this weekend.  Kwabena will call  with update.    Received call from Kwabena with [facility].  Patient unable to return this weekend.

## 2023-12-09 NOTE — PROGRESS NOTES
VANCOMYCIN PHARMACOKINETIC NOTE:  Vancomycin Day # 1    Objective/Assessment:    Diagnosis/Indication for Vancomycin:Bacteremia      63 y.o., female; Actual Body Weight = 61.8 kg (136 lb 4.3 oz).    The patient has the following labs:  12/9/2023 Estimated Creatinine Clearance: 79.7 mL/min (based on SCr of 0.6 mg/dL). Lab Results   Component Value Date    BUN 25 (H) 12/09/2023     Lab Results   Component Value Date    WBC 20.79 (H) 12/09/2023          Plan:  Adjust vancomycin dose and/or frequency based on the patient's actual weight and renal function:  Initiate Vancomycin 1000 mg IV every 12 hours.  Orders have been entered into patient's chart.        Vancomycin trough level has been ordered for 12/11 @0400    Pharmacy will manage vancomycin therapy, monitor serum vancomycin levels, monitor renal function and adjust regimen as necessary.    Thank you for allowing us to participate in this patient's care.     KANDACE MATHUR 12/9/2023   Department of Pharmacy  Ext [phone]

## 2023-12-09 NOTE — PROGRESS NOTES
[facility]  Department of Infectious Disease  Progress Note        PATIENT NAME: Steff Johnson  MRN: [id number]  TODAY'S DATE: 12/09/2023  ADMIT DATE: 12/5/2023  LENGTH OF STAY: 4 DAYS    PRINCIPLE PROBLEM: Severe sepsis    REASON FOR CONSULT:  Gram negative sepsis     INTERVAL HISTORY      12/7@1246 (Denette):  Patient seen and examined in her room.  She was lying in bed and opens eyes to voice and initially makes eye contact then turns away.  She does not follow commands or move any extremities spontaneously.  T-max 100.4° at 7:00 p.m. yesterday.  WBC decreased to 24.48 from 30/9 0.98, yesterday 37% bands, none today, + left shift, BUN/CR improving at 40/0.9.  , lactic acid decreased to 1.2, procalcitonin 133.  MRSA screen was negative, vanc level 12.0.  Respiratory virus panel negative, 12/6 blood cultures negative, sputum culture not obtain, 12/5 blood cultures with 4 4 bottles positive for Proteus species.  Urine culture with Gram-negative sheryl non lactose .  12/09/2023.  Dr. Calvo covering for the weekend.  Afebrile.  T-max 98.7° but she does feel warm on physical exam.  I hope she is not about to spike a fever.    Nice drop in WBC 40.9--39--24.4--22.8--20.79.  Nice drop in procalcitonin 133--17.9   Nice drop in CRP:  293--143  Repeat blood cultures of 12/08/2023 are negative to date.  Hemoglobin 9.6--7.9--7.9--7.1--7.2.  Platelets are still elevated 577--492--474--421--495.  Catheter was exchanged on the 6th.  Urine is almost clear.    Antibiotics (From admission, onward)      Start     Stop Route Frequency Ordered    12/08/23 2100  meropenem 1 g in sodium chloride 0.9 % 100 mL IVPB (ready to mix system)        Note to Pharmacy: Original order: meropenem 1 g Q 8 hours    -- IV Every 8 hours (non-standard times) 12/08/23 1627    12/06/23 0900  mupirocin 2 % ointment         12/11/23 0859 Nasl 2 times daily 12/05/23 1773          ASSESSMENT and PLAN     1.  Sepsis with bacteremia  with Bio[employer] positive for Proteus species ESBL gene present   -12/5 blood cultures x2 sets with 4 of 4 bottles positive for Proteus species, ESBL gene +  -12/6 blood cultures 2/4 x ESBL.  -blood cultures of 12/08/2023 are no growth to date.  Bacteremia has cleared.    Nice drop in WBC 40.9--39--24.4--22.8--20.79.    Nice drop in procalcitonin 133--17.9     Nice drop in CRP:  293--143    Hemoglobin 9.6--7.9--7.9--7.1--7.2.    Platelets are still elevated 577--492--474--421--495.     2.  Acute respiratory failure with hypoxia with possible right-sided aspiration pneumonia;              -on 1-2 L O2  -CT chest with patch really nodular opacities of the right upper lobe possibly aspiration pneumonitis and right middle and lower lobe with pneumonia and/or atelectasis     3.   Impaired cognition and mobility s/p CVA living in SNF with chronic indwelling Del Rosario catheter and decubitus ulcer  -Urine likely source, del rosario changed, decubitus appears clean, seen by wound care,      4. Acute kidney injury on admission, resolved.  -renal ultrasound with probable mild right hydronephrosis no stones; she was seen by Urology on last admission for mild right hydroureteronephrosis possibly due to neurogenic bladder and reflex versus obstruction from poorly compliant bladder - 10/24 urine culture with Enterococcus faecalis 50k-99k CFU/ml treated with amoxicillin  -CT abdomen and pelvis with shotty retroperitoneal lymph nodes, 1 mm calcification in distal left ureter, cholelithiasis     5.  Chronic medical problems including diabetes mellitus, hypertension,  bed-bound, and COPD       RECOMMENDATIONS:  Continue meropenem, adjusted for kidney function, 1 g IV every 8 hours   When ready to discharge may use ertapenem 1 g IV q.day x 14 days  Midline  Weekly labs x3 CMP, CBC, ESR, CRP,    Wound care to all affected areas  Aspiration precautions   Follow most recent blood cultures make sure they stay negative.    That 1 mm stone may be small,  but may still cause trouble.  Add Flomax and follow-up with urologist          SUBJECTIVE    Review of Systems  Unable to obtain    OBJECTIVE   Temp:  [98.3 °F (36.8 °C)-99.3 °F (37.4 °C)] 98.7 °F (37.1 °C)  Pulse:  [] 86  Resp:  [16-20] 18  SpO2:  [95 %-100 %] 100 %  BP: (144-182)/(35-87) 144/67  Temp:  [98.3 °F (36.8 °C)-99.3 °F (37.4 °C)]   Temp: 98.7 °F (37.1 °C) (12/09/23 1100)  Pulse: 86 (12/09/23 1241)  Resp: 18 (12/09/23 1241)  BP: (!) 144/67 (notified nurse) (12/09/23 1100)  SpO2: 100 % (12/09/23 1241)    Intake/Output Summary (Last 24 hours) at 12/9/2023 1420  Last data filed at 12/9/2023 1100  Gross per 24 hour   Intake 765 ml   Output 950 ml   Net -185 ml        Physical Exam  General:  Opens eyes to voice, seems to track initially but does not follow commands or move any extremities spontaneously.  Eyes: Eyes with no icterus or injection.  No exudates.  Ears:  She responds to voice.  Nose: Nares patent  Mouth: patient would not open her mouth.  Neck: No tenderness to palpation.  Cardiovascular: Regular rate and rhythm, no murmurs, mild pedal edema.  Respiratory:  Nasal cannula at 1-2 L a minute.  Diminished at bases because she does not comply with breathing deeply.  Question rhonchi right anterior lower  Gastrointestinal:  Soft and obese with active bowel sounds, no tenderness to palpation, no distention.  Peg tube with tube feeding infusing.  Site with some crusting around bumper.  Genitourinary:  Urinary catheter with almost clear yellow urine,   Musculoskeletal:  No spontaneous movement, positioned on left side propped up on pillows, heel protectors in place.  Nurse and myself examined the sacral wound and turned her to her right.  Skin: Warm and dry, no obvious rashes. No surrounding erythema. No odor drainage. Left thigh blister popped, rt thigh blister intact.   Neuro:  No sounds, questionable tracking, eyes open to voice, initially makes eye contact but won't hold gaze.   Psych:  No  "agitation.  VAD: PIV left upper extremity  Isolation: Contact for ESBL    WOUNDS    12/6:                 Significant Labs: All pertinent labs within the past 24 hours have been reviewed.    CBC LAST 7 DAYS  Recent Labs   Lab 12/05/23 2028 12/05/23 2134 12/06/23 0526 12/07/23 0508 12/08/23 0544 12/09/23  0547   WBC  --  40.96* 39.98* 24.48* 22.83* 20.79*   RBC  --  3.74* 3.08* 3.04* 2.82* 2.88*   HGB  --  9.6* 7.9* 7.9* 7.1* 7.2*   HCT 27* 28.7* 23.4* 23.0* 21.8* 21.9*   MCV  --  77* 76* 76* 77* 76*   MCH  --  25.7* 25.6* 26.0* 25.2* 25.0*   MCHC  --  33.4 33.8 34.3 32.6 32.9   RDW  --  21.8* 21.4* 21.5* 22.0* 22.3*   PLT  --  577* 492* 474* 421 495*   MPV  --  10.4 10.7 10.3 10.9 10.7   GRAN  --  69.0 49.0 87.7*  21.4* 77.5*  17.7* 72.0   LYMPH  --  5.0* 5.0* 5.3*  1.3 10.2*  2.3 12.0*   MONO  --  5.0 5.0 4.6  1.1* 8.2  1.9* 10.0   BASO  --   --   --  0.08 0.08  --    NRBC  --  0 0 0 0 0       CHEMISTRY LAST 7 DAYS  Recent Labs   Lab 12/05/23 2028 12/05/23 2134 12/06/23 0526 12/07/23 0508 12/08/23 0544 12/09/23  0547   NA  --  135* 135* 139 142 143   K  --  5.2* 4.4 3.9 3.8 4.0   CL  --  94* 99 104 108 109   CO2  --  28 28 25 27 28   ANIONGAP  --  13 8 10 7* 6*   BUN  --  67* 61* 40* 32* 25*   CREATININE  --  1.7* 1.5* 0.9 0.7 0.6   GLU  --  221* 216* 186* 120* 110   CALCIUM  --  9.6 8.4* 9.2 9.1 8.9   PH 7.540*  --   --   --   --   --    MG  --   --  2.2 2.0 1.8 1.8   ALBUMIN  --  3.3*  --   --   --   --    PROT  --  7.4  --   --   --   --    ALKPHOS  --  98  --   --   --   --    ALT  --  30  --   --   --   --    AST  --  35  --   --   --   --    BILITOT  --  0.5  --   --   --   --        Estimated Creatinine Clearance: 79.7 mL/min (based on SCr of 0.6 mg/dL).    INFLAMMATORY/PROCAL  LAST 7 DAYS  Recent Labs   Lab 12/05/23  2134 12/06/23  0526 12/09/23  0547   PROCAL 133.671*  --  17.936*   CRP  --  293.0* 143.3*     No results found for: "ESR"  CRP   Date Value Ref Range Status   12/09/2023 143.3 " (H) 0.0 - 8.2 mg/L Final   12/06/2023 293.0 (H) 0.0 - 8.2 mg/L Final   10/25/2023 92.6 (H) 0.0 - 8.2 mg/L Final   06/15/2023 0.65 <0.76 mg/dL Final   11/23/2021 3.21 (H) <0.76 mg/dL Final   07/04/2021 0.34 <0.76 mg/dL Final   07/03/2021 0.87 (H) <0.76 mg/dL Final       PRIOR  MICROBIOLOGY:    Susceptibility data from last 90 days.  Collected Specimen Info Organism Amp/Sulbactam Ampicillin Cefazolin Cefepime Ceftriaxone Ciprofloxacin Ertapenem Gentamicin Levofloxacin Meropenem Nitrofurantoin PIPERACILLIN/TAZO SUSCEPTIBILITY Tetracycline Tobramycin   12/06/23 Blood from Antecubital, Right Hand Proteus mirabilis ESBL                 12/05/23 Blood Proteus mirabilis ESBL                 12/05/23 Blood Proteus mirabilis ESBL  S  R  R  R  R  R  S  S  R  S   S  R  S   12/05/23 Urine Proteus mirabilis ESBL  S  R  R  R  R  R  S  S  R  S  R  S  R  S   10/24/23 Urine from Clean Catch Enterococcus faecalis   S          S   S    10/24/23 Blood from Antecubital, Right No growth after 5 days.                 10/24/23 Blood from Antecubital, Left No growth after 5 days.                   Collected Specimen Info Organism Trimeth/Sulfa Vancomycin   12/06/23 Blood from Antecubital, Right Hand Proteus mirabilis ESBL     12/05/23 Blood Proteus mirabilis ESBL     12/05/23 Blood Proteus mirabilis ESBL  R    12/05/23 Urine Proteus mirabilis ESBL  R    10/24/23 Urine from Clean Catch Enterococcus faecalis   S   10/24/23 Blood from Antecubital, Right No growth after 5 days.     10/24/23 Blood from Antecubital, Left No growth after 5 days.           LAST 7 DAYS MICROBIOLOGY   Microbiology Results (last 7 days)       Procedure Component Value Units Date/Time    Blood culture [[id number]] Collected: 12/06/23 1521    Order Status: Completed Specimen: Blood from Peripheral, Right Arm Updated: 12/09/23 0048     Blood Culture, Routine Gram stain aer bottle: Gram negative rods      Positive results previously called 12/09/2023  00:48 KS3     Narrative:      Collection has been rescheduled by [facility] at 12/06/2023 13:17 Reason:   Duplicate order waiting for dr lott to cancel. Spoke to rob RN.  Collection has been rescheduled by [facility] at 12/06/2023 13:17 Reason:   Duplicate order waiting for dr lott to cancel. Spoke to rob GARCIA.    Blood culture [[id number]] Collected: 12/08/23 1208    Order Status: Completed Specimen: Blood Updated: 12/08/23 2117     Blood Culture, Routine No Growth to date    Blood culture [[id number]] Collected: 12/08/23 1208    Order Status: Completed Specimen: Blood Updated: 12/08/23 2117     Blood Culture, Routine No Growth to date    Urine culture [[id number]]  (Abnormal)  (Susceptibility) Collected: 12/05/23 2003    Order Status: Completed Specimen: Urine Updated: 12/08/23 0757     Urine Culture, Routine PROTEUS MIRABILIS ESBL  >100,000 cfu/ml  Known ESBL patient      Narrative:      Specimen Source->Urine    Blood culture [[id number]]  (Abnormal) Collected: 12/06/23 1522    Order Status: Completed Specimen: Blood from Antecubital, Right Hand Updated: 12/08/23 0738     Blood Culture, Routine Gram stain aer bottle: Gram negative rods      Positive results previously called 12/07/2023  18:19 KS3      PROTEUS MIRABILIS ESBL  Known ESBL patient  For susceptibility see order #[id number]      Narrative:      Collection has been rescheduled by [facility] at 12/06/2023 13:17 Reason:   Duplicate order waiting for dr lott to cancel. Spoke to rob GARCIA.  Collection has been rescheduled by [facility] at 12/06/2023 13:17 Reason:   Duplicate order waiting for dr lott to cancel. Spoke to rob GARCIA.    Blood culture x two cultures. Draw prior to antibiotics. [[id number]]  (Abnormal) Collected: 12/05/23 2017    Order Status: Completed Specimen: Blood Updated: 12/08/23 0736     Blood Culture, Routine Gram stain lisa bottle: Gram negative rods      Gram stain aer bottle: Gram negative rods      Results called to and read back by:Rob Dugan RN  12/06/2023       10:39 JBM      Positive results previously called      PROTEUS MIRABILIS ESBL  Known ESBL patient  For susceptibility see order #[id number]      Narrative:      Aerobic and anaerobic  Collection has been rescheduled by JS at 12/05/2023 19:29 Reason:   Meline,ER tech said to come back in a minute. Patient needs to be   cleaned.  Collection has been rescheduled by [email or web address] at 12/05/2023 20:20 Reason:   Done  Collection has been rescheduled by [email or web address] at 12/05/2023 19:29 Reason:   Meline,ER tech said to come back in a minute. Patient needs to be   cleaned.  Collection has been rescheduled by [email or web address] at 12/05/2023 20:20 Reason:   Done    Blood culture x two cultures. Draw prior to antibiotics. [[id number]]  (Abnormal)  (Susceptibility) Collected: 12/05/23 2007    Order Status: Completed Specimen: Blood Updated: 12/08/23 0735     Blood Culture, Routine Gram stain lisa bottle: Gram negative rods      Gram stain aer bottle: Gram negative rods      Results called to and read back by:Natalie Dugan RN  12/06/2023      10:39 JBM      Positive results previously called      PROTEUS MIRABILIS ESBL  Known ESBL patient      Narrative:      Aerobic and anaerobic  Collection has been rescheduled by JS at 12/05/2023 19:29 Reason:   Meline,ER tech said to come back in a minute. Patient needs to be   cleaned.  Collection has been rescheduled by [email or web address] at 12/05/2023 20:20 Reason:   Done  Collection has been rescheduled by JS at 12/05/2023 19:29 Reason:   Meline,ER tech said to come back in a minute. Patient needs to be   cleaned.  Collection has been rescheduled by JS at 12/05/2023 20:20 Reason:   Done    MRSA Screen by PCR [[id number]] Collected: 12/06/23 1645    Order Status: Completed Specimen: Nasopharyngeal Swab from Nasal Updated: 12/06/23 2239     MRSA SCREEN BY PCR Negative    Respiratory Infection Panel (PCR), Nasopharyngeal [[id number]] Collected: 12/06/23 1645    Order Status: Completed Specimen: Nasopharyngeal Swab Updated:  12/06/23 1905     Respiratory Infection Panel Source NP swab     Adenovirus Not Detected     Coronavirus 229E, Common Cold Virus Not Detected     Coronavirus HKU1, Common Cold Virus Not Detected     Coronavirus NL63, Common Cold Virus Not Detected     Coronavirus OC43, Common Cold Virus Not Detected     Comment: Coronavirus strains 229E, HKU1, NL63, and OC43 can cause the common   cold   and are not associated with the respiratory disease outbreak caused   by  the COVID-19 (SARS-CoV-2 novel Coronavirus) strain.           SARS-CoV2 (COVID-19) Qualitative PCR Not Detected     Human Metapneumovirus Not Detected     Human Rhinovirus/Enterovirus Not Detected     Influenza A (subtypes H1, H1-2009,H3) Not Detected     Influenza B Not Detected     Parainfluenza Virus 1 Not Detected     Parainfluenza Virus 2 Not Detected     Parainfluenza Virus 3 Not Detected     Parainfluenza Virus 4 Not Detected     Respiratory Syncytial Virus Not Detected     Bordetella Parapertussis (YL2087) Not Detected     Bordetella pertussis (ptxP) Not Detected     Chlamydia pneumoniae Not Detected     Mycoplasma pneumoniae Not Detected     Comment: Respiratory Infection Panel testing performed by Multiplex PCR.       Narrative:      Respiratory Infection Panel source->NP Swab    Culture, Respiratory with Gram Stain [[id number]]     Order Status: Sent Specimen: Sputum, Induced     Rapid Organism ID by PCR (from Blood culture) [[id number]]  (Abnormal) Collected: 12/05/23 2007    Order Status: Completed Updated: 12/06/23 1151     Enterococcus faecalis Not Detected     Enterococcus faecium Not Detected     Listeria monocytogenes Not Detected     Staphylococcus spp. Not Detected     Staphylococcus aureus Not Detected     Staphylococcus epidermidis Not Detected     Staphylococcus lugdunensis Not Detected     Streptococcus species Not Detected     Streptococcus agalactiae Not Detected     Streptococcus pneumoniae Not Detected     Streptococcus pyogenes Not  Detected     Acinetobacter calcoaceticus/baumannii complex Not Detected     Bacteroides fragilis Not Detected     Enterobacterales See species for ID     Comment: critical result(s) called and verbal readback obtained from VERONICA AndersonED by CD3 12/06/2023 11:51          Enterobacter cloacae complex Not Detected     Escherichia coli Not Detected     Klebsiella aerogenes Not Detected     Klebsiella oxytoca Not Detected     Klebsiella pneumoniae group Not Detected     Proteus Detected     Comment: critical result(s) called and verbal readback obtained from VERONICA AndersonED by CD3 12/06/2023 11:51          Salmonella sp Not Detected     Serratia marcescens Not Detected     Haemophilus influenzae Not Detected     Neisseria meningtidis Not Detected     Pseudomonas aeruginosa Not Detected     Stenotrophomonas maltophilia Not Detected     Candida albicans Not Detected     Candida auris Not Detected     Candida glabrata Not Detected     Candida krusei Not Detected     Candida parapsilosis Not Detected     Candida tropicalis Not Detected     Cryptococcus neoformans/gattii Not Detected     CTX-M (ESBL ) Detected     Comment: critical result(s) called and verbal readback obtained from VERONICA AndersonED by CD3 12/06/2023 11:51          IMP (Carbapenem resistant) Not Detected     KPC resistance gene (Carbapenem resistant) Not Detected     mcr-1  Test not applicable     mec A/C  Test not applicable     mec A/C and MREJ (MRSA) gene Test not applicable     NDM (Carbapenem resistant) Not Detected     OXA-48-like (Carbapenem resistant) Not Detected     van A/B (VRE gene) Test not applicable     VIM (Carbapenem resistant) Not Detected    Narrative:      Aerobic and anaerobic     critical result(s) called and verbal readback obtained from VERONICA AndersonED by CD3 12/06/2023 11:51              CURRENT/PREVIOUS VISIT EKG  Results for orders placed or performed during the hospital encounter of 12/05/23   EKG 12-lead     Collection Time: 12/05/23  7:35 PM    Narrative    Test Reason : R00.0,    Vent. Rate : 112 BPM     Atrial Rate : 112 BPM     P-R Int : 132 ms          QRS Dur : 066 ms      QT Int : 340 ms       P-R-T Axes : 066 051 066 degrees     QTc Int : 464 ms    Sinus tachycardia  Otherwise normal ECG  When compared with ECG of 24-OCT-2023 14:57,  No significant change was found    Referred By: [email or web address]   SELF           Confirmed By:          Significant Imaging: I have reviewed all relevant and available imaging results/findings within the past 24 hours.    US Retroperitoneal Complete 12/06/23 0123   Limited evaluation. Probable mild right hydronephrosis.     X-Ray Chest AP Portable 12/05/23 1939   IMPRESSION: No acute pulmonary process    CT Chest Without Contrast 12/06/23 1817   Patchy nodular opacities involving the right upper lobe with surrounding groundglass attenuation, concerning for infectious/inflammatory process including aspiration pneumonitis.   Airspace consolidation with volume loss right middle lobe and right lower lobe, likely representing a combination of pneumonia and atelectasis.    CT Renal Stone Study Abd Pelvis WO 12/06/23 1816   Negative for obstructive uropathy.   Probable 1 mm punctate calculus within the distal left ureter.   Cholelithiasis.   Additional incidental findings as described above      Ariana Calvo MD  Date of Service: 12/09/2023  2:04 PM

## 2023-12-09 NOTE — PLAN OF CARE
Per report, Pt now with positive blood cultures.  MANNY contacted Kwabena at [facility] and advised him that discharge will not occur today, as he was looking to see if he could accommodate her need for IV ABX today.  CM to continue to follow.

## 2023-12-09 NOTE — CARE UPDATE
12/09/23 0815   Patient Assessment/Suction   Level of Consciousness (AVPU) responds to voice   Respiratory Effort Normal;Unlabored   Expansion/Accessory Muscles/Retractions no use of accessory muscles;no retractions;expansion symmetric   All Lung Fields Breath Sounds clear   Rhythm/Pattern, Respiratory unlabored   Cough Frequency no cough   PRE-TX-O2   Device (Oxygen Therapy) room air   SpO2 98 %   Pulse Oximetry Type Intermittent   $ Pulse Oximetry - Multiple Charge Pulse Oximetry - Multiple   Pulse 100   Resp 20   Aerosol Therapy   $ Aerosol Therapy Charges Aerosol Treatment   Daily Review of Necessity (SVN) completed   Respiratory Treatment Status (SVN) given   Treatment Route (SVN) mask;oxygen   Patient Position (SVN) HOB elevated   Post Treatment Assessment (SVN) breath sounds unchanged   Signs of Intolerance (SVN) none   Breath Sounds Post-Respiratory Treatment   Throughout All Fields Post-Treatment All Fields   Throughout All Fields Post-Treatment no change   Post-treatment Heart Rate (beats/min) 98   Post-treatment Resp Rate (breaths/min) 20   Education   $ Education Bronchodilator;15 min

## 2023-12-10 LAB
ACINETOBACTER CALCOACETICUS/BAUMANNII COMPLEX: NOT DETECTED
ANION GAP SERPL CALC-SCNC: 6 MMOL/L (ref 8–16)
ANISOCYTOSIS BLD QL SMEAR: SLIGHT
BACTERIA BLD CULT: ABNORMAL
BACTEROIDES FRAGILIS: NOT DETECTED
BASOPHILS # BLD AUTO: ABNORMAL K/UL (ref 0–0.2)
BASOPHILS NFR BLD: 0 % (ref 0–1.9)
BUN SERPL-MCNC: 24 MG/DL (ref 8–23)
CALCIUM SERPL-MCNC: 8.5 MG/DL (ref 8.7–10.5)
CANDIDA ALBICANS: NOT DETECTED
CANDIDA AURIS: NOT DETECTED
CANDIDA GLABRATA: DETECTED
CANDIDA KRUSEI: NOT DETECTED
CANDIDA PARAPSILOSIS: NOT DETECTED
CANDIDA TROPICALIS: NOT DETECTED
CHLORIDE SERPL-SCNC: 108 MMOL/L (ref 95–110)
CO2 SERPL-SCNC: 26 MMOL/L (ref 23–29)
CREAT SERPL-MCNC: 0.6 MG/DL (ref 0.5–1.4)
CRYPTOCOCCUS NEOFORMANS/GATTII: NOT DETECTED
CTX-M GENE (ESBL PRODUCER): ABNORMAL
DIFFERENTIAL METHOD BLD: ABNORMAL
ENTEROBACTER CLOACAE COMPLEX: NOT DETECTED
ENTEROBACTERALES: NOT DETECTED
ENTEROCOCCUS FAECALIS: NOT DETECTED
ENTEROCOCCUS FAECIUM: NOT DETECTED
EOSINOPHIL # BLD AUTO: ABNORMAL K/UL (ref 0–0.5)
EOSINOPHIL NFR BLD: 1 % (ref 0–8)
ERYTHROCYTE [DISTWIDTH] IN BLOOD BY AUTOMATED COUNT: 22.9 % (ref 11.5–14.5)
ESCHERICHIA COLI: NOT DETECTED
EST. GFR  (NO RACE VARIABLE): >60 ML/MIN/1.73 M^2
GLUCOSE SERPL-MCNC: 124 MG/DL (ref 70–110)
GLUCOSE SERPL-MCNC: 140 MG/DL (ref 70–110)
GLUCOSE SERPL-MCNC: 149 MG/DL (ref 70–110)
GLUCOSE SERPL-MCNC: 158 MG/DL (ref 70–110)
GLUCOSE SERPL-MCNC: 181 MG/DL (ref 70–110)
HAEMOPHILUS INFLUENZAE: NOT DETECTED
HCT VFR BLD AUTO: 21.9 % (ref 37–48.5)
HGB BLD-MCNC: 7.3 G/DL (ref 12–16)
HYPOCHROMIA BLD QL SMEAR: ABNORMAL
IMM GRANULOCYTES # BLD AUTO: ABNORMAL K/UL (ref 0–0.04)
IMM GRANULOCYTES NFR BLD AUTO: ABNORMAL % (ref 0–0.5)
IMP GENE (CARBAPENEM RESISTANT): ABNORMAL
KLEBSIELLA AEROGENES: NOT DETECTED
KLEBSIELLA OXYTOCA: NOT DETECTED
KLEBSIELLA PNEUMONIAE GROUP: NOT DETECTED
KPC RESISTANCE GENE (CARBAPENEM): ABNORMAL
LISTERIA MONOCYTOGENES: NOT DETECTED
LYMPHOCYTES # BLD AUTO: ABNORMAL K/UL (ref 1–4.8)
LYMPHOCYTES NFR BLD: 14 % (ref 18–48)
MAGNESIUM SERPL-MCNC: 2 MG/DL (ref 1.6–2.6)
MCH RBC QN AUTO: 25.6 PG (ref 27–31)
MCHC RBC AUTO-ENTMCNC: 33.3 G/DL (ref 32–36)
MCR-1: ABNORMAL
MCV RBC AUTO: 77 FL (ref 82–98)
MEC A/C AND MREJ (MRSA): ABNORMAL
MEC A/C: ABNORMAL
MONOCYTES # BLD AUTO: ABNORMAL K/UL (ref 0.3–1)
MONOCYTES NFR BLD: 5 % (ref 4–15)
MYELOCYTES NFR BLD MANUAL: 2 %
NDM GENE (CARBAPENEM RESISTANT): ABNORMAL
NEISSERIA MENINGITIDIS: NOT DETECTED
NEUTROPHILS NFR BLD: 76 % (ref 38–73)
NEUTS BAND NFR BLD MANUAL: 2 %
NRBC BLD-RTO: 0 /100 WBC
OXA-48-LIKE (CARBAPENEM RESISTANT): ABNORMAL
PHOSPHATE SERPL-MCNC: 2.6 MG/DL (ref 2.7–4.5)
PLATELET # BLD AUTO: 383 K/UL (ref 150–450)
PLATELET BLD QL SMEAR: ABNORMAL
PMV BLD AUTO: 11.5 FL (ref 9.2–12.9)
POIKILOCYTOSIS BLD QL SMEAR: SLIGHT
POLYCHROMASIA BLD QL SMEAR: ABNORMAL
POTASSIUM SERPL-SCNC: 4.3 MMOL/L (ref 3.5–5.1)
PROTEUS SPECIES: NOT DETECTED
PSEUDOMONAS AERUGINOSA: NOT DETECTED
RBC # BLD AUTO: 2.85 M/UL (ref 4–5.4)
SALMONELLA SP: NOT DETECTED
SCHISTOCYTES BLD QL SMEAR: PRESENT
SERRATIA MARCESCENS: NOT DETECTED
SODIUM SERPL-SCNC: 140 MMOL/L (ref 136–145)
STAPHYLOCOCCUS AUREUS: NOT DETECTED
STAPHYLOCOCCUS EPIDERMIDIS: NOT DETECTED
STAPHYLOCOCCUS LUGDUNESIS: NOT DETECTED
STAPHYLOCOCCUS SPECIES: NOT DETECTED
STENOTROPHOMONAS MALTOPHILIA: NOT DETECTED
STREPTOCOCCUS AGALACTIAE: NOT DETECTED
STREPTOCOCCUS PNEUMONIAE: NOT DETECTED
STREPTOCOCCUS PYOGENES: NOT DETECTED
STREPTOCOCCUS SPECIES: NOT DETECTED
VAN A/B (VRE GENE): ABNORMAL
VIM GENE (CARBAPENEM RESISTANT): ABNORMAL
WBC # BLD AUTO: 24.88 K/UL (ref 3.9–12.7)

## 2023-12-10 PROCEDURE — 94760 N-INVAS EAR/PLS OXIMETRY 1: CPT

## 2023-12-10 PROCEDURE — 63600175 PHARM REV CODE 636 W HCPCS: Performed by: NURSE PRACTITIONER

## 2023-12-10 PROCEDURE — 12000002 HC ACUTE/MED SURGE SEMI-PRIVATE ROOM

## 2023-12-10 PROCEDURE — 25000003 PHARM REV CODE 250: Performed by: STUDENT IN AN ORGANIZED HEALTH CARE EDUCATION/TRAINING PROGRAM

## 2023-12-10 PROCEDURE — 25000003 PHARM REV CODE 250: Performed by: NURSE PRACTITIONER

## 2023-12-10 PROCEDURE — 87040 BLOOD CULTURE FOR BACTERIA: CPT | Performed by: INTERNAL MEDICINE

## 2023-12-10 PROCEDURE — 25000003 PHARM REV CODE 250: Performed by: INTERNAL MEDICINE

## 2023-12-10 PROCEDURE — 80048 BASIC METABOLIC PNL TOTAL CA: CPT | Performed by: STUDENT IN AN ORGANIZED HEALTH CARE EDUCATION/TRAINING PROGRAM

## 2023-12-10 PROCEDURE — 63600175 PHARM REV CODE 636 W HCPCS: Performed by: STUDENT IN AN ORGANIZED HEALTH CARE EDUCATION/TRAINING PROGRAM

## 2023-12-10 PROCEDURE — 94761 N-INVAS EAR/PLS OXIMETRY MLT: CPT

## 2023-12-10 PROCEDURE — 99900035 HC TECH TIME PER 15 MIN (STAT)

## 2023-12-10 PROCEDURE — 99233 SBSQ HOSP IP/OBS HIGH 50: CPT | Mod: ,,, | Performed by: INTERNAL MEDICINE

## 2023-12-10 PROCEDURE — 94640 AIRWAY INHALATION TREATMENT: CPT

## 2023-12-10 PROCEDURE — 63600175 PHARM REV CODE 636 W HCPCS: Performed by: INTERNAL MEDICINE

## 2023-12-10 PROCEDURE — 83735 ASSAY OF MAGNESIUM: CPT | Performed by: STUDENT IN AN ORGANIZED HEALTH CARE EDUCATION/TRAINING PROGRAM

## 2023-12-10 PROCEDURE — 25000242 PHARM REV CODE 250 ALT 637 W/ HCPCS: Performed by: STUDENT IN AN ORGANIZED HEALTH CARE EDUCATION/TRAINING PROGRAM

## 2023-12-10 PROCEDURE — 84100 ASSAY OF PHOSPHORUS: CPT | Performed by: INTERNAL MEDICINE

## 2023-12-10 PROCEDURE — 99900031 HC PATIENT EDUCATION (STAT)

## 2023-12-10 PROCEDURE — 85027 COMPLETE CBC AUTOMATED: CPT | Performed by: STUDENT IN AN ORGANIZED HEALTH CARE EDUCATION/TRAINING PROGRAM

## 2023-12-10 PROCEDURE — 94799 UNLISTED PULMONARY SVC/PX: CPT

## 2023-12-10 PROCEDURE — 85007 BL SMEAR W/DIFF WBC COUNT: CPT | Performed by: STUDENT IN AN ORGANIZED HEALTH CARE EDUCATION/TRAINING PROGRAM

## 2023-12-10 RX ADMIN — METOPROLOL TARTRATE 25 MG: 25 TABLET, FILM COATED ORAL at 09:12

## 2023-12-10 RX ADMIN — POLYETHYLENE GLYCOL 3350 17 G: 17 POWDER, FOR SOLUTION ORAL at 09:12

## 2023-12-10 RX ADMIN — LEVETIRACETAM 500 MG: 100 SOLUTION ORAL at 09:12

## 2023-12-10 RX ADMIN — INSULIN DETEMIR 35 UNITS: 100 INJECTION, SOLUTION SUBCUTANEOUS at 09:12

## 2023-12-10 RX ADMIN — AMLODIPINE BESYLATE 10 MG: 5 TABLET ORAL at 09:12

## 2023-12-10 RX ADMIN — MUPIROCIN 1 G: 20 OINTMENT TOPICAL at 09:12

## 2023-12-10 RX ADMIN — VANCOMYCIN HYDROCHLORIDE 1000 MG: 1 INJECTION, POWDER, LYOPHILIZED, FOR SOLUTION INTRAVENOUS at 05:12

## 2023-12-10 RX ADMIN — IPRATROPIUM BROMIDE 0.5 MG: 0.5 SOLUTION RESPIRATORY (INHALATION) at 08:12

## 2023-12-10 RX ADMIN — FAMOTIDINE 20 MG: 20 TABLET ORAL at 09:12

## 2023-12-10 RX ADMIN — ATORVASTATIN CALCIUM 80 MG: 40 TABLET, FILM COATED ORAL at 09:12

## 2023-12-10 RX ADMIN — MEROPENEM 1 G: 1 INJECTION, POWDER, FOR SOLUTION INTRAVENOUS at 09:12

## 2023-12-10 RX ADMIN — APIXABAN 2.5 MG: 2.5 TABLET, FILM COATED ORAL at 09:12

## 2023-12-10 RX ADMIN — IPRATROPIUM BROMIDE 0.5 MG: 0.5 SOLUTION RESPIRATORY (INHALATION) at 12:12

## 2023-12-10 RX ADMIN — MICAFUNGIN SODIUM 100 MG: 100 INJECTION, POWDER, LYOPHILIZED, FOR SOLUTION INTRAVENOUS at 09:12

## 2023-12-10 RX ADMIN — ASPIRIN 81 MG 81 MG: 81 TABLET ORAL at 09:12

## 2023-12-10 RX ADMIN — MEROPENEM 1 G: 1 INJECTION, POWDER, FOR SOLUTION INTRAVENOUS at 01:12

## 2023-12-10 RX ADMIN — SODIUM CHLORIDE, SODIUM LACTATE, POTASSIUM CHLORIDE, AND CALCIUM CHLORIDE: .6; .31; .03; .02 INJECTION, SOLUTION INTRAVENOUS at 09:12

## 2023-12-10 RX ADMIN — TAMSULOSIN HYDROCHLORIDE 0.4 MG: 0.4 CAPSULE ORAL at 09:12

## 2023-12-10 NOTE — CARE UPDATE
12/10/23 0839   Patient Assessment/Suction   Level of Consciousness (AVPU) alert   Respiratory Effort Normal;Unlabored   Expansion/Accessory Muscles/Retractions no use of accessory muscles;no retractions;expansion symmetric   All Lung Fields Breath Sounds diminished   Rhythm/Pattern, Respiratory unlabored   Cough Frequency no cough   PRE-TX-O2   Device (Oxygen Therapy) room air   SpO2 100 %   Pulse Oximetry Type Intermittent   $ Pulse Oximetry - Multiple Charge Pulse Oximetry - Multiple   Pulse 88   Resp 17   Aerosol Therapy   $ Aerosol Therapy Charges Aerosol Treatment   Daily Review of Necessity (SVN) completed   Respiratory Treatment Status (SVN) given   Treatment Route (SVN) mask;oxygen   Patient Position (SVN) HOB elevated   Post Treatment Assessment (SVN) breath sounds unchanged   Signs of Intolerance (SVN) none   Breath Sounds Post-Respiratory Treatment   Throughout All Fields Post-Treatment All Fields   Throughout All Fields Post-Treatment no change   Post-treatment Heart Rate (beats/min) 8   Post-treatment Resp Rate (breaths/min) 17   Education   $ Education Bronchodilator;15 min

## 2023-12-10 NOTE — PROGRESS NOTES
[facility]  Department of Infectious Disease  Progress Note        PATIENT NAME: Steff Johnson  MRN: [id number]  TODAY'S DATE: 12/10/2023  ADMIT DATE: 12/5/2023  LENGTH OF STAY: 5 DAYS    PRINCIPLE PROBLEM: Severe sepsis    REASON FOR CONSULT:  Gram negative sepsis     INTERVAL HISTORY    12/10/2023 Dr. Calvo covering for the weekend.  Patient looks much more comfortable today, compared to yesterday.  She is nonverbal as usual.  T-max 99.8°.  WBC worsened 20--24.8.  Blood cultures from 12/ 8 grew Enterococcus vancomycin was started yesterday.  Tonight there are morbid news.  C glabrata from blood cultures.    12/09/2023.  Dr. Calvo covering for the weekend.  Afebrile.  T-max 98.7° but she does feel warm on physical exam.  I hope she is not about to spike a fever.    Nice drop in WBC 40.9--39--24.4--22.8--20.79.  Nice drop in procalcitonin 133--17.9   Nice drop in CRP:  293--143  Repeat blood cultures of 12/08/2023 are negative to date.  Hemoglobin 9.6--7.9--7.9--7.1--7.2.  Platelets are still elevated 577--492--474--421--495.  Catheter was exchanged on the 6th.  Urine is almost clear.  12/7@1246 (Denette):  Patient seen and examined in her room.  She was lying in bed and opens eyes to voice and initially makes eye contact then turns away.  She does not follow commands or move any extremities spontaneously.  T-max 100.4° at 7:00 p.m. yesterday.  WBC decreased to 24.48 from 30/9 0.98, yesterday 37% bands, none today, + left shift, BUN/CR improving at 40/0.9.  , lactic acid decreased to 1.2, procalcitonin 133.  MRSA screen was negative, vanc level 12.0.  Respiratory virus panel negative, 12/6 blood cultures negative, sputum culture not obtain, 12/5 blood cultures with 4 4 bottles positive for Proteus species.  Urine culture with Gram-negative hseryl non lactose .      Antibiotics (From admission, onward)      Start     Stop Route Frequency Ordered    12/09/23 1700  vancomycin in dextrose 5 %  1 gram/250 mL IVPB 1,000 mg         -- IV Every 12 hours (non-standard times) 12/09/23 1553    12/09/23 1607  vancomycin - pharmacy to dose  (vancomycin IVPB (PEDS and ADULTS))        See [facility] for full Linked Orders Report.    -- IV pharmacy to manage frequency 12/09/23 1507    12/08/23 2100  meropenem 1 g in sodium chloride 0.9 % 100 mL IVPB (ready to mix system)        Note to Pharmacy: Original order: meropenem 1 g Q 8 hours    -- IV Every 8 hours (non-standard times) 12/08/23 1627    12/06/23 0900  mupirocin 2 % ointment         12/11/23 0859 Nasl 2 times daily 12/05/23 2308          ASSESSMENT and PLAN     1.  Sepsis with polymicrobial bacteremia   Proteus mirabilis ESBL 12/05/23 12/06/23  Enterococcus species 12/08/2023.  C glabrata 12/08/2023   Urine likely source, del rosario changed, decubitus appears clean    12/05/2023 urine culture Proteus mirabilis ESBL  Urine culture on prior visit, 10/24/2023 had Enterococcus faecalis.   Leukocytosis   Elevated procalcitonin  Elevated CRP   Elevated platelets    2.  Acute respiratory failure with hypoxia with possible right-sided aspiration pneumonia;                -on 1-2 L O2  -CT chest with patch really nodular opacities of the right upper lobe possibly aspiration pneumonitis and right middle and lower lobe with pneumonia and/or atelectasis     3.   Impaired cognition and mobility s/p CVA living in SNF with chronic indwelling Del Rosario catheter and decubitus ulcer       4. Acute kidney injury on admission, resolved.  -renal ultrasound with probable mild right hydronephrosis no stones; she was seen by Urology on last admission for mild right hydroureteronephrosis possibly due to neurogenic bladder and reflex versus obstruction from poorly compliant bladder   -CT abdomen and pelvis with shotty retroperitoneal lymph nodes, 1 mm calcification in distal left ureter, cholelithiasis     5.  Chronic medical problems including diabetes mellitus, hypertension,  bed-bound, and  COPD         RECOMMENDATIONS:  Hold discharge due to the new information of Enterococcus bacteremia and C glabrata candidemia.      Continue meropenem  1 g IV every 8 hours   Continue vancomycin for Enterococcus species--day 2.    Add micafungin for C glabrata candidemia.    Repeat blood cultures till the above clears  Weekly labs x3 CMP, CBC, ESR, CRP,  Ct abdomen w contrast     Wound care to all affected areas  Aspiration precautions           SUBJECTIVE    Review of Systems  Unable to obtain    OBJECTIVE   Temp:  [98.4 °F (36.9 °C)-99.8 °F (37.7 °C)] 99.1 °F (37.3 °C)  Pulse:  [81-96] 89  Resp:  [16-18] 18  SpO2:  [93 %-100 %] 97 %  BP: (147-158)/(65-79) 147/70  Temp:  [98.4 °F (36.9 °C)-99.8 °F (37.7 °C)]   Temp: 99.1 °F (37.3 °C) (12/10/23 1948)  Pulse: 89 (12/10/23 1948)  Resp: 18 (12/10/23 1948)  BP: (!) 147/70 (12/10/23 1948)  SpO2: 97 % (12/10/23 1948)    Intake/Output Summary (Last 24 hours) at 12/10/2023 2050  Last data filed at 12/10/2023 1836  Gross per 24 hour   Intake 1730 ml   Output 2001 ml   Net -271 ml        Physical Exam  General:  Opens eyes to voice, seems to track initially but does not follow commands or move any extremities spontaneously.  Eyes: Eyes with no icterus or injection.  No exudates.  Ears:  She responds to voice.  Nose: Nares patent  Mouth: patient would not open her mouth.  Neck: No tenderness to palpation.  Cardiovascular: Regular rate and rhythm, no murmurs, mild pedal edema.  Respiratory:  Nasal cannula at 1-2 L a minute.  Diminished at bases because she does not comply with breathing deeply.  Question rhonchi right anterior lower  Gastrointestinal:  Soft and obese with active bowel sounds, no tenderness to palpation, no distention.  Peg tube with tube feeding   Site with some crusting around bumper.  Genitourinary:  Urinary catheter with almost clear yellow urine,   Musculoskeletal:  No spontaneous movement, positioned on left side propped up on pillows, heel protectors in  place.   Skin: see pics  Neuro:  No sounds, questionable tracking, eyes open to voice, initially makes eye contact but won't hold gaze.   Psych:  No agitation.  VAD: PIV left upper extremity  Isolation: Contact for ESBL    WOUNDS    12/6:                 Significant Labs: All pertinent labs within the past 24 hours have been reviewed.    CBC LAST 7 DAYS  Recent Labs   Lab 12/05/23 2028 12/05/23 2134 12/05/23  2134 12/06/23  0526 12/07/23  0508 12/08/23  0544 12/09/23  0547 12/10/23  0626   WBC  --  40.96*  --  39.98* 24.48* 22.83* 20.79* 24.88*   RBC  --  3.74*  --  3.08* 3.04* 2.82* 2.88* 2.85*   HGB  --  9.6*  --  7.9* 7.9* 7.1* 7.2* 7.3*   HCT 27* 28.7*  --  23.4* 23.0* 21.8* 21.9* 21.9*   MCV  --  77*  --  76* 76* 77* 76* 77*   MCH  --  25.7*  --  25.6* 26.0* 25.2* 25.0* 25.6*   MCHC  --  33.4  --  33.8 34.3 32.6 32.9 33.3   RDW  --  21.8*  --  21.4* 21.5* 22.0* 22.3* 22.9*   PLT  --  577*  --  492* 474* 421 495* 383   MPV  --  10.4  --  10.7 10.3 10.9 10.7 11.5   GRAN  --  69.0   < > 49.0 87.7*  21.4* 77.5*  17.7* 72.0 76.0*   LYMPH  --  5.0*   < > 5.0* 5.3*  1.3 10.2*  2.3 12.0* 14.0*  CANCELED   MONO  --  5.0   < > 5.0 4.6  1.1* 8.2  1.9* 10.0 5.0  CANCELED   BASO  --   --   --   --  0.08 0.08  --  CANCELED   NRBC  --  0  --  0 0 0 0 0    < > = values in this interval not displayed.       CHEMISTRY LAST 7 DAYS  Recent Labs   Lab 12/05/23 2028 12/05/23 2134 12/06/23  0526 12/07/23  0508 12/08/23  0544 12/09/23  0547 12/10/23  0626   NA  --  135* 135* 139 142 143 140   K  --  5.2* 4.4 3.9 3.8 4.0 4.3   CL  --  94* 99 104 108 109 108   CO2  --  28 28 25 27 28 26   ANIONGAP  --  13 8 10 7* 6* 6*   BUN  --  67* 61* 40* 32* 25* 24*   CREATININE  --  1.7* 1.5* 0.9 0.7 0.6 0.6   GLU  --  221* 216* 186* 120* 110 140*   CALCIUM  --  9.6 8.4* 9.2 9.1 8.9 8.5*   PH 7.540*  --   --   --   --   --   --    MG  --   --  2.2 2.0 1.8 1.8 2.0   ALBUMIN  --  3.3*  --   --   --   --   --    PROT  --  7.4  --   --   --  "  --   --    ALKPHOS  --  98  --   --   --   --   --    ALT  --  30  --   --   --   --   --    AST  --  35  --   --   --   --   --    BILITOT  --  0.5  --   --   --   --   --        Estimated Creatinine Clearance: 79.7 mL/min (based on SCr of 0.6 mg/dL).    INFLAMMATORY/PROCAL  LAST 7 DAYS  Recent Labs   Lab 12/05/23 2134 12/06/23 0526 12/09/23 0547   PROCAL 133.671*  --  17.936*   CRP  --  293.0* 143.3*     No results found for: "ESR"  CRP   Date Value Ref Range Status   12/09/2023 143.3 (H) 0.0 - 8.2 mg/L Final   12/06/2023 293.0 (H) 0.0 - 8.2 mg/L Final   10/25/2023 92.6 (H) 0.0 - 8.2 mg/L Final   06/15/2023 0.65 <0.76 mg/dL Final   11/23/2021 3.21 (H) <0.76 mg/dL Final   07/04/2021 0.34 <0.76 mg/dL Final   07/03/2021 0.87 (H) <0.76 mg/dL Final       PRIOR  MICROBIOLOGY:    Susceptibility data from last 90 days.  Collected Specimen Info Organism Amp/Sulbactam Ampicillin Cefazolin Cefepime Ceftriaxone Ciprofloxacin Ertapenem Gentamicin Levofloxacin Meropenem Nitrofurantoin PIPERACILLIN/TAZO SUSCEPTIBILITY Tetracycline Tobramycin   12/08/23 Blood Enterococcus species                 12/06/23 Blood from Antecubital, Right Hand Proteus mirabilis ESBL                 12/06/23 Blood from Peripheral, Right Arm Proteus mirabilis ESBL                 12/05/23 Blood Proteus mirabilis ESBL                 12/05/23 Blood Proteus mirabilis ESBL  S  R  R  R  R  R  S  S  R  S   S  R  S   12/05/23 Urine Proteus mirabilis ESBL  S  R  R  R  R  R  S  S  R  S  R  S  R  S   10/24/23 Urine from Clean Catch Enterococcus faecalis   S          S   S    10/24/23 Blood from Antecubital, Right No growth after 5 days.                 10/24/23 Blood from Antecubital, Left No growth after 5 days.                   Collected Specimen Info Organism Trimeth/Sulfa Vancomycin   12/08/23 Blood Enterococcus species     12/06/23 Blood from Antecubital, Right Hand Proteus mirabilis ESBL     12/06/23 Blood from Peripheral, Right Arm Proteus mirabilis " ESBL     12/05/23 Blood Proteus mirabilis ESBL     12/05/23 Blood Proteus mirabilis ESBL  R    12/05/23 Urine Proteus mirabilis ESBL  R    10/24/23 Urine from Clean Catch Enterococcus faecalis   S   10/24/23 Blood from Antecubital, Right No growth after 5 days.     10/24/23 Blood from Antecubital, Left No growth after 5 days.           LAST 7 DAYS MICROBIOLOGY   Microbiology Results (last 7 days)       Procedure Component Value Units Date/Time    Rapid Organism ID by PCR (from Blood culture) [[id number]]  (Abnormal) Collected: 12/08/23 1208    Order Status: Completed Updated: 12/10/23 7022     Enterococcus faecalis Not Detected     Enterococcus faecium Not Detected     Listeria monocytogenes Not Detected     Staphylococcus spp. Not Detected     Staphylococcus aureus Not Detected     Staphylococcus epidermidis Not Detected     Staphylococcus lugdunensis Not Detected     Streptococcus species Not Detected     Streptococcus agalactiae Not Detected     Streptococcus pneumoniae Not Detected     Streptococcus pyogenes Not Detected     Acinetobacter calcoaceticus/baumannii complex Not Detected     Bacteroides fragilis Not Detected     Enterobacterales Not Detected     Enterobacter cloacae complex Not Detected     Escherichia coli Not Detected     Klebsiella aerogenes Not Detected     Klebsiella oxytoca Not Detected     Klebsiella pneumoniae group Not Detected     Proteus Not Detected     Salmonella sp Not Detected     Serratia marcescens Not Detected     Haemophilus influenzae Not Detected     Neisseria meningtidis Not Detected     Pseudomonas aeruginosa Not Detected     Stenotrophomonas maltophilia Not Detected     Candida albicans Not Detected     Candida auris Not Detected     Juli glabrata Detected     Juli krusei Not Detected     Candida parapsilosis Not Detected     Candida tropicalis Not Detected     Cryptococcus neoformans/gattii Not Detected     CTX-M (ESBL ) Test not applicable     IMP (Carbapenem  resistant) Test not applicable     KPC resistance gene (Carbapenem resistant) Test not applicable     mcr-1  Test not applicable     mec A/C  Test not applicable     mec A/C and MREJ (MRSA) gene Test not applicable     NDM (Carbapenem resistant) Test not applicable     OXA-48-like (Carbapenem resistant) Test not applicable     van A/B (VRE gene) Test not applicable     VIM (Carbapenem resistant) Test not applicable    Blood culture [[id number]] Collected: 12/08/23 1208    Order Status: Completed Specimen: Blood Updated: 12/10/23 1729     Blood Culture, Routine Gram stain aer bottle: budding yeast      Results called to and read back by: Mary Braun LPN on [address] [place] [address],      12/10/2023 17:29    Blood culture [[id number]] Collected: 12/10/23 0626    Order Status: Completed Specimen: Blood Updated: 12/10/23 1358     Blood Culture, Routine No Growth to date    Blood culture [[id number]]  (Abnormal) Collected: 12/06/23 1521    Order Status: Completed Specimen: Blood from Peripheral, Right Arm Updated: 12/10/23 0745     Blood Culture, Routine Gram stain aer bottle: Gram negative rods      Positive results previously called 12/09/2023  00:48 KS3      PROTEUS MIRABILIS ESBL  For susceptibility see order #[id number]  Known ESBL patient      Narrative:      Collection has been rescheduled by [facility] at 12/06/2023 13:17 Reason:   Duplicate order waiting for dr lott to cancel. Spoke to rob GARCIA.  Collection has been rescheduled by [facility] at 12/06/2023 13:17 Reason:   Duplicate order waiting for dr lott to cancel. Spoke to rob GARCIA.    Blood culture [[id number]]  (Abnormal) Collected: 12/08/23 1208    Order Status: Completed Specimen: Blood Updated: 12/10/23 0742     Blood Culture, Routine Gram stain aer bottle: Gram positive cocci      Results called to and read back by: Priscilla Boyce RN on [address] [place] [address],      12/09/2023 14:53 vcb      ENTEROCOCCUS SPECIES  Identification and susceptibility pending      Rapid Organism ID by PCR (from Blood  culture) [[id number]]  (Abnormal) Collected: 12/08/23 1208    Order Status: Completed Updated: 12/09/23 1551     Enterococcus faecalis Detected     Enterococcus faecium Not Detected     Listeria monocytogenes Not Detected     Staphylococcus spp. Not Detected     Staphylococcus aureus Not Detected     Staphylococcus epidermidis Not Detected     Staphylococcus lugdunensis Not Detected     Streptococcus species Not Detected     Streptococcus agalactiae Not Detected     Streptococcus pneumoniae Not Detected     Streptococcus pyogenes Not Detected     Acinetobacter calcoaceticus/baumannii complex Not Detected     Bacteroides fragilis Not Detected     Enterobacterales Not Detected     Enterobacter cloacae complex Not Detected     Escherichia coli Not Detected     Klebsiella aerogenes Not Detected     Klebsiella oxytoca Not Detected     Klebsiella pneumoniae group Not Detected     Proteus Not Detected     Salmonella sp Not Detected     Serratia marcescens Not Detected     Haemophilus influenzae Not Detected     Neisseria meningtidis Not Detected     Pseudomonas aeruginosa Not Detected     Stenotrophomonas maltophilia Not Detected     Candida albicans Not Detected     Candida auris Not Detected     Candida glabrata Not Detected     Candida krusei Not Detected     Candida parapsilosis Not Detected     Candida tropicalis Not Detected     Cryptococcus neoformans/gattii Not Detected     CTX-M (ESBL ) Test not applicable     IMP (Carbapenem resistant) Test not applicable     KPC resistance gene (Carbapenem resistant) Test not applicable     mcr-1  Test not applicable     mec A/C  Test not applicable     mec A/C and MREJ (MRSA) gene Test not applicable     NDM (Carbapenem resistant) Test not applicable     OXA-48-like (Carbapenem resistant) Test not applicable     van A/B (VRE gene) Not Detected     VIM (Carbapenem resistant) Test not applicable    Urine culture [[id number]]  (Abnormal)  (Susceptibility) Collected:  12/05/23 2003    Order Status: Completed Specimen: Urine Updated: 12/08/23 0757     Urine Culture, Routine PROTEUS MIRABILIS ESBL  >100,000 cfu/ml  Known ESBL patient      Narrative:      Specimen Source->Urine    Blood culture [[id number]]  (Abnormal) Collected: 12/06/23 1522    Order Status: Completed Specimen: Blood from Antecubital, Right Hand Updated: 12/08/23 0738     Blood Culture, Routine Gram stain aer bottle: Gram negative rods      Positive results previously called 12/07/2023  18:19 KS3      PROTEUS MIRABILIS ESBL  Known ESBL patient  For susceptibility see order #[id number]      Narrative:      Collection has been rescheduled by [facility] at 12/06/2023 13:17 Reason:   Duplicate order waiting for dr lott to cancel. Spoke to rob GARCIA.  Collection has been rescheduled by [facility] at 12/06/2023 13:17 Reason:   Duplicate order waiting for dr lott to cancel. Spoke to rob GARCIA.    Blood culture x two cultures. Draw prior to antibiotics. [[id number]]  (Abnormal) Collected: 12/05/23 2017    Order Status: Completed Specimen: Blood Updated: 12/08/23 0736     Blood Culture, Routine Gram stain lisa bottle: Gram negative rods      Gram stain aer bottle: Gram negative rods      Results called to and read back by:Rob Dugan RN  12/06/2023      10:39 JBM      Positive results previously called      PROTEUS MIRABILIS ESBL  Known ESBL patient  For susceptibility see order #[id number]      Narrative:      Aerobic and anaerobic  Collection has been rescheduled by [facility] at 12/05/2023 19:29 Reason:   Meline,ER tech said to come back in a minute. Patient needs to be   cleaned.  Collection has been rescheduled by [facility] at 12/05/2023 20:20 Reason:   Done  Collection has been rescheduled by [facility] at 12/05/2023 19:29 Reason:   Meline,ER tech said to come back in a minute. Patient needs to be   cleaned.  Collection has been rescheduled by [facility] at 12/05/2023 20:20 Reason:   Done    Blood culture x two cultures. Draw prior to antibiotics.  [[id number]]  (Abnormal)  (Susceptibility) Collected: 12/05/23 2007    Order Status: Completed Specimen: Blood Updated: 12/08/23 0735     Blood Culture, Routine Gram stain lisa bottle: Gram negative rods      Gram stain aer bottle: Gram negative rods      Results called to and read back by:Natalie Dugan RN  12/06/2023      10:39 JBM      Positive results previously called      PROTEUS MIRABILIS ESBL  Known ESBL patient      Narrative:      Aerobic and anaerobic  Collection has been rescheduled by [facility] at 12/05/2023 19:29 Reason:   Meline,ER tech said to come back in a minute. Patient needs to be   cleaned.  Collection has been rescheduled by [facility] at 12/05/2023 20:20 Reason:   Done  Collection has been rescheduled by JS at 12/05/2023 19:29 Reason:   Meline,ER tech said to come back in a minute. Patient needs to be   cleaned.  Collection has been rescheduled by JS at 12/05/2023 20:20 Reason:   Done    MRSA Screen by PCR [[id number]] Collected: 12/06/23 1645    Order Status: Completed Specimen: Nasopharyngeal Swab from Nasal Updated: 12/06/23 2239     MRSA SCREEN BY PCR Negative    Respiratory Infection Panel (PCR), Nasopharyngeal [[id number]] Collected: 12/06/23 1645    Order Status: Completed Specimen: Nasopharyngeal Swab Updated: 12/06/23 1905     Respiratory Infection Panel Source NP swab     Adenovirus Not Detected     Coronavirus 229E, Common Cold Virus Not Detected     Coronavirus HKU1, Common Cold Virus Not Detected     Coronavirus NL63, Common Cold Virus Not Detected     Coronavirus OC43, Common Cold Virus Not Detected     Comment: Coronavirus strains 229E, HKU1, NL63, and OC43 can cause the common   cold   and are not associated with the respiratory disease outbreak caused   by  the COVID-19 (SARS-CoV-2 novel Coronavirus) strain.           SARS-CoV2 (COVID-19) Qualitative PCR Not Detected     Human Metapneumovirus Not Detected     Human Rhinovirus/Enterovirus Not Detected     Influenza A (subtypes H1,  H1-2009,H3) Not Detected     Influenza B Not Detected     Parainfluenza Virus 1 Not Detected     Parainfluenza Virus 2 Not Detected     Parainfluenza Virus 3 Not Detected     Parainfluenza Virus 4 Not Detected     Respiratory Syncytial Virus Not Detected     Bordetella Parapertussis ([id number]) Not Detected     Bordetella pertussis (ptxP) Not Detected     Chlamydia pneumoniae Not Detected     Mycoplasma pneumoniae Not Detected     Comment: Respiratory Infection Panel testing performed by Multiplex PCR.       Narrative:      Respiratory Infection Panel source->NP Swab    Culture, Respiratory with Gram Stain [[id number]]     Order Status: Sent Specimen: Sputum, Induced     Rapid Organism ID by PCR (from Blood culture) [[id number]]  (Abnormal) Collected: 12/05/23 2007    Order Status: Completed Updated: 12/06/23 1151     Enterococcus faecalis Not Detected     Enterococcus faecium Not Detected     Listeria monocytogenes Not Detected     Staphylococcus spp. Not Detected     Staphylococcus aureus Not Detected     Staphylococcus epidermidis Not Detected     Staphylococcus lugdunensis Not Detected     Streptococcus species Not Detected     Streptococcus agalactiae Not Detected     Streptococcus pneumoniae Not Detected     Streptococcus pyogenes Not Detected     Acinetobacter calcoaceticus/baumannii complex Not Detected     Bacteroides fragilis Not Detected     Enterobacterales See species for ID     Comment: critical result(s) called and verbal readback obtained from VERONICA AndersonED by CD3 12/06/2023 11:51          Enterobacter cloacae complex Not Detected     Escherichia coli Not Detected     Klebsiella aerogenes Not Detected     Klebsiella oxytoca Not Detected     Klebsiella pneumoniae group Not Detected     Proteus Detected     Comment: critical result(s) called and verbal readback obtained from MARIBEL Anderson by CD3 12/06/2023 11:51          Salmonella sp Not Detected     Serratia marcescens Not Detected      Haemophilus influenzae Not Detected     Neisseria meningtidis Not Detected     Pseudomonas aeruginosa Not Detected     Stenotrophomonas maltophilia Not Detected     Candida albicans Not Detected     Candida auris Not Detected     Candida glabrata Not Detected     Candida krusei Not Detected     Candida parapsilosis Not Detected     Candida tropicalis Not Detected     Cryptococcus neoformans/gattii Not Detected     CTX-M (ESBL ) Detected     Comment: critical result(s) called and verbal readback obtained from Meghann Al RN-ED by CD3 12/06/2023 11:51          IMP (Carbapenem resistant) Not Detected     KPC resistance gene (Carbapenem resistant) Not Detected     mcr-1  Test not applicable     mec A/C  Test not applicable     mec A/C and MREJ (MRSA) gene Test not applicable     NDM (Carbapenem resistant) Not Detected     OXA-48-like (Carbapenem resistant) Not Detected     van A/B (VRE gene) Test not applicable     VIM (Carbapenem resistant) Not Detected    Narrative:      Aerobic and anaerobic     critical result(s) called and verbal readback obtained from Meghann Al RN-ED by CD3 12/06/2023 11:51              CURRENT/PREVIOUS VISIT EKG  Results for orders placed or performed during the hospital encounter of 12/05/23   EKG 12-lead    Collection Time: 12/05/23  7:35 PM    Narrative    Test Reason : R00.0,    Vent. Rate : 112 BPM     Atrial Rate : 112 BPM     P-R Int : 132 ms          QRS Dur : 066 ms      QT Int : 340 ms       P-R-T Axes : 066 051 066 degrees     QTc Int : 464 ms    Sinus tachycardia  Otherwise normal ECG  When compared with ECG of 24-OCT-2023 14:57,  No significant change was found    Referred By: [email or web address]   SELF           Confirmed By:          Significant Imaging: I have reviewed all relevant and available imaging results/findings within the past 24 hours.    US Retroperitoneal Complete 12/06/23 0123   Limited evaluation. Probable mild right hydronephrosis.     X-Ray Chest AP Portable  12/05/23 1939   IMPRESSION: No acute pulmonary process    CT Chest Without Contrast 12/06/23 1817   Patchy nodular opacities involving the right upper lobe with surrounding groundglass attenuation, concerning for infectious/inflammatory process including aspiration pneumonitis.   Airspace consolidation with volume loss right middle lobe and right lower lobe, likely representing a combination of pneumonia and atelectasis.    CT Renal Stone Study Abd Pelvis WO 12/06/23 1816   Negative for obstructive uropathy.   Probable 1 mm punctate calculus within the distal left ureter.   Cholelithiasis.   Additional incidental findings as described above      Ariana Calvo MD  Date of Service: 12/10/2023  2:04 PM

## 2023-12-10 NOTE — PROGRESS NOTES
[facility]  Progress Note    Patient name: Steff Johnson  MRN: [id number]  Admit Date: 12/5/2023   LOS: 5 days     SUBJECTIVE:     Principal problem: Severe sepsis    HPI:  Patient is a 63-year-old female with history of severe CVA with aphasia, upper and lower extremity weakness, bed-bound, cognitive impairment, hypertension, hyperlipidemia, type 2 diabetes, COPD presents to the emergency room for concerns of hematuria and hypoxia.  Patient nonverbal at baseline, history provided by ED physician.  Tried to contact daughter, was unsuccessful.  Per ED, patient presented from [facility], staff noticed bloody urine output from her chronic indwelling Mathur catheter as well as hypoxia.  Non-rebreather was placed on patient and this improved her oxygenation saturation.  Brought to ED by EMS.  Per ED physician patient did have episode of emesis, likely aspirating.     On admission temp 100.4, heart rate 111, O2 89%, WBC 40.96, hemoglobin 9.6, potassium 5.2, creatinine 1.7, lactic 3.8, procalcitonin 133.671.  Review of CXR did not show any obvious consolidations.  UA consistent with UTI.      Interval History:      12/9- patient had blood cx + yesterday GPC.  Started on vanc.  Repeat blood cx.  May need to consider echo to eval for vegetation.     12/8- axillary temp 99.5, wbc 22, blood cx sensitivities still pending.      12/7- she is still running low grade temp overnight, wbc improving but still elevated at 24. Continue IV abx.   Wound and ID following    Scheduled Meds:   amLODIPine  10 mg Oral Daily    apixaban  2.5 mg Per G Tube BID    aspirin  81 mg Per G Tube Daily    atorvastatin  80 mg Per G Tube QHS    famotidine  20 mg Oral Daily    insulin detemir U-100  35 Units Subcutaneous Daily    ipratropium  0.5 mg Nebulization Q6H    levetiracetam  500 mg Per G Tube BID    meropenem (MERREM) IVPB  1 g Intravenous Q8H    metoprolol tartrate  25 mg Per G Tube BID    mupirocin   Nasal  BID    polyethylene glycol  17 g Per G Tube Daily    tamsulosin  0.4 mg Oral Daily    vancomycin (VANCOCIN) IV (PEDS and ADULTS)  1,000 mg Intravenous Q12H     Continuous Infusions:   lactated ringers 125 mL/hr at 12/10/23 0953     PRN Meds:acetaminophen, albuterol-ipratropium, dextrose 50%, dextrose 50%, glucagon (human recombinant), glucose, glucose, insulin aspart U-100, magnesium oxide, magnesium oxide, ondansetron, potassium bicarbonate, potassium bicarbonate, potassium bicarbonate, potassium, sodium phosphates, potassium, sodium phosphates, potassium, sodium phosphates, sodium chloride 0.9%, Pharmacy to dose Vancomycin consult **AND** vancomycin - pharmacy to dose    Review of patient's allergies indicates:  No Known Allergies    Review of Systems: As per interval history    OBJECTIVE:     Vital Signs (Most Recent)  Temp: 99.2 °F (37.3 °C) (12/10/23 0510)  Pulse: 88 (12/10/23 0839)  Resp: 17 (12/10/23 0839)  BP: (!) 148/68 (12/10/23 0510)  SpO2: 100 % (12/10/23 0839)    Vital Signs Range (Last 24H):  Temp:  [98.5 °F (36.9 °C)-99.9 °F (37.7 °C)]   Pulse:  [86-96]   Resp:  [16-18]   BP: (148-158)/(65-74)   SpO2:  [94 %-100 %]     I & O (Last 24H):  Intake/Output Summary (Last 24 hours) at 12/10/2023 1203  Last data filed at 12/10/2023 0629  Gross per 24 hour   Intake 2375 ml   Output 1600 ml   Net 775 ml         Physical Exam:    Gen- non-verbal, min responsive.  Eyes open, doesn't track  CV- RRR  Pulm- even respirations, no tachypnea, accessory use.    GI- soft, non-tender.  PEG LLQ  - deferred  Skin- warm, dry.        Laboratory:  I have reviewed all pertinent lab results within the past 24 hours.      ASSESSMENT/PLAN:         Active Hospital Problems    Diagnosis  POA    *Severe sepsis [A41.9, R65.20]  Yes    ESBL (extended spectrum beta-lactamase) producing bacteria infection [A49.9, Z16.12]  Unknown    Acute respiratory failure with hypoxia [J96.01]  Unknown    Bacteremia due to Proteus species [R78.81,  B96.4]  Unknown    Bedridden [Z74.01]  Not Applicable    Blisters of multiple sites [R23.8]  Unknown    Chronic indwelling Mathur catheter [Z97.8]  Not Applicable    Cognitive impairment [R41.89]  Unknown    Hydronephrosis [N13.30]  Unknown    Hypoxia [R09.02]  Unknown    Pressure injury of sacral region, stage 3 [L89.153]  Unknown    Aspiration pneumonia of right lung due to vomit [J69.0]  Unknown    Leucocytosis [D72.829]  Yes    UTI (urinary tract infection) [N39.0]  Yes    Hyperkalemia [E87.5]  Yes    Pressure injury of sacral region, unstageable [L89.150]  Yes    Aphasia [R47.01]  Yes    Cognitive communication deficit [R41.841]  Yes    Combined forms of age-related cataract, bilateral [H25.813]  Yes    COURTNEY (acute kidney injury) [N17.9]  Yes    CVA (cerebral vascular accident) [I63.9]  Yes    Mixed hyperlipidemia [E78.2]  Yes    Hyperglycemia due to type 2 diabetes mellitus [E11.65]  Yes    Hypertension associated with diabetes [E11.59, I15.2]  Yes      Resolved Hospital Problems   No resolved problems to display.         Plan:     Severe sepsis, improved  Repeat lactic down to 1.2  Continue empiric abx  Follow up cx s/s   12/8 bcx + gpc  Start vanc, repeat blood cx  May need echo/GT?   Follow up with ID    CVA (cerebral vascular accident)  History of CVA, nonverbal and bed-bound at baseline  Has PEG tube, nutrition consulted for tube feedings.    Has chronic indwelling catheter, replaced in the emergency room.        Hyperkalemia  This patient has hyperkalemia which is uncontrolled. We will monitor for arrhythmias with EKG or continuous telemetry. We will treat the hyperkalemia with IV fluids, follow up repeat BMP. The likely etiology of the hyperkalemia is COURNTEY.  The patients latest potassium has been reviewed and the results are listed below      Recent Labs   Lab 12/05/23  2134   K 5.2*               UTI (urinary tract infection)  Continue with empiric IV antibiotics   Follow up CBC and CMP        Pressure  injury of sacral region, unstageable  Wound care consulted        Aphasia  Noted        Cognitive communication deficit  Chronic issue         COURTNEY (acute kidney injury)  Patient with acute kidney injury/acute renal failure likely due to pre-renal azotemia due to dehydration COURTNEY is currently stable. Baseline creatinine  0.6  - Labs reviewed- Renal function/electrolytes with Estimated Creatinine Clearance: 25.4 mL/min (A) (based on SCr of 1.7 mg/dL (H)). according to latest data. Monitor urine output and serial BMP and adjust therapy as needed. Avoid nephrotoxins and renally dose meds for GFR listed above.     Continue IV fluids        Mixed hyperlipidemia  Continue outpatient statin        Hyperglycemia due to type 2 diabetes mellitus  Continue Levemir, started patient on sliding scale        Hypertension associated with diabetes  Continue outpatient antihypertensives, monitor blood pressure    VTE Risk Mitigation (From admission, onward)           Ordered     apixaban tablet 2.5 mg  2 times daily         12/05/23 2300     IP VTE HIGH RISK PATIENT  Once         12/05/23 2300     Place sequential compression device  Until discontinued         12/05/23 2300                        Department Hospital Medicine  [facility]  Yazan Denise MD  Date of service: 12/10/2023

## 2023-12-10 NOTE — PROGRESS NOTES
[facility]  Progress Note    Patient name: Steff Johnson  MRN: [id number]  Admit Date: 12/5/2023   LOS: 5 days     SUBJECTIVE:     Principal problem: Severe sepsis    HPI:  Patient is a 63-year-old female with history of severe CVA with aphasia, upper and lower extremity weakness, bed-bound, cognitive impairment, hypertension, hyperlipidemia, type 2 diabetes, COPD presents to the emergency room for concerns of hematuria and hypoxia.  Patient nonverbal at baseline, history provided by ED physician.  Tried to contact daughter, was unsuccessful.  Per ED, patient presented from [facility], staff noticed bloody urine output from her chronic indwelling Mathur catheter as well as hypoxia.  Non-rebreather was placed on patient and this improved her oxygenation saturation.  Brought to ED by EMS.  Per ED physician patient did have episode of emesis, likely aspirating.     On admission temp 100.4, heart rate 111, O2 89%, WBC 40.96, hemoglobin 9.6, potassium 5.2, creatinine 1.7, lactic 3.8, procalcitonin 133.671.  Review of CXR did not show any obvious consolidations.  UA consistent with UTI.      Interval History:      12/10-  tmax 99.8 overnight, feels warm on exam.  May need to get GT to r/o vegetation.     12/9- patient had blood cx + yesterday GPC.  Started on vanc.  Repeat blood cx.  May need to consider echo to eval for vegetation.     12/8- axillary temp 99.5, wbc 22, blood cx sensitivities still pending.      12/7- she is still running low grade temp overnight, wbc improving but still elevated at 24. Continue IV abx.   Wound and ID following    Scheduled Meds:   amLODIPine  10 mg Oral Daily    apixaban  2.5 mg Per G Tube BID    aspirin  81 mg Per G Tube Daily    atorvastatin  80 mg Per G Tube QHS    famotidine  20 mg Oral Daily    insulin detemir U-100  35 Units Subcutaneous Daily    ipratropium  0.5 mg Nebulization Q6H    levetiracetam  500 mg Per G Tube BID    meropenem (MERREM)  IVPB  1 g Intravenous Q8H    metoprolol tartrate  25 mg Per G Tube BID    mupirocin   Nasal BID    polyethylene glycol  17 g Per G Tube Daily    tamsulosin  0.4 mg Oral Daily    vancomycin (VANCOCIN) IV (PEDS and ADULTS)  1,000 mg Intravenous Q12H     Continuous Infusions:   lactated ringers 125 mL/hr at 12/10/23 0953     PRN Meds:acetaminophen, albuterol-ipratropium, dextrose 50%, dextrose 50%, glucagon (human recombinant), glucose, glucose, insulin aspart U-100, magnesium oxide, magnesium oxide, ondansetron, potassium bicarbonate, potassium bicarbonate, potassium bicarbonate, potassium, sodium phosphates, potassium, sodium phosphates, potassium, sodium phosphates, sodium chloride 0.9%, Pharmacy to dose Vancomycin consult **AND** vancomycin - pharmacy to dose    Review of patient's allergies indicates:  No Known Allergies    Review of Systems: As per interval history    OBJECTIVE:     Vital Signs (Most Recent)  Temp: 99.2 °F (37.3 °C) (12/10/23 0510)  Pulse: 88 (12/10/23 0839)  Resp: 17 (12/10/23 0839)  BP: (!) 148/68 (12/10/23 0510)  SpO2: 100 % (12/10/23 0839)    Vital Signs Range (Last 24H):  Temp:  [98.5 °F (36.9 °C)-99.9 °F (37.7 °C)]   Pulse:  [86-96]   Resp:  [16-18]   BP: (148-158)/(65-74)   SpO2:  [94 %-100 %]     I & O (Last 24H):  Intake/Output Summary (Last 24 hours) at 12/10/2023 1206  Last data filed at 12/10/2023 0629  Gross per 24 hour   Intake 2375 ml   Output 1600 ml   Net 775 ml         Physical Exam:    Gen- non-verbal, min responsive.  Eyes open, doesn't track  CV- RRR  Pulm- even respirations, no tachypnea, accessory use.    GI- soft, non-tender.  PEG LLQ  - deferred  Skin- warm, dry.        Laboratory:  I have reviewed all pertinent lab results within the past 24 hours.      ASSESSMENT/PLAN:         Active Hospital Problems    Diagnosis  POA    *Severe sepsis [A41.9, R65.20]  Yes    ESBL (extended spectrum beta-lactamase) producing bacteria infection [A49.9, Z16.12]  Unknown    Acute  respiratory failure with hypoxia [J96.01]  Unknown    Bacteremia due to Proteus species [R78.81, B96.4]  Unknown    Bedridden [Z74.01]  Not Applicable    Blisters of multiple sites [R23.8]  Unknown    Chronic indwelling Mathur catheter [Z97.8]  Not Applicable    Cognitive impairment [R41.89]  Unknown    Hydronephrosis [N13.30]  Unknown    Hypoxia [R09.02]  Unknown    Pressure injury of sacral region, stage 3 [L89.153]  Unknown    Aspiration pneumonia of right lung due to vomit [J69.0]  Unknown    Leucocytosis [D72.829]  Yes    UTI (urinary tract infection) [N39.0]  Yes    Hyperkalemia [E87.5]  Yes    Pressure injury of sacral region, unstageable [L89.150]  Yes    Aphasia [R47.01]  Yes    Cognitive communication deficit [R41.841]  Yes    Combined forms of age-related cataract, bilateral [H25.813]  Yes    COURTNEY (acute kidney injury) [N17.9]  Yes    CVA (cerebral vascular accident) [I63.9]  Yes    Mixed hyperlipidemia [E78.2]  Yes    Hyperglycemia due to type 2 diabetes mellitus [E11.65]  Yes    Hypertension associated with diabetes [E11.59, I15.2]  Yes      Resolved Hospital Problems   No resolved problems to display.         Plan:     Severe sepsis, improved  Repeat lactic down to 1.2  Continue empiric abx  Follow up cx s/s   12/8 bcx + gpc  Start vanc, repeat blood cx  May need echo/GT?   Follow up with ID    CVA (cerebral vascular accident)  History of CVA, nonverbal and bed-bound at baseline  Has PEG tube, nutrition consulted for tube feedings.    Has chronic indwelling catheter, replaced in the emergency room.        Hyperkalemia  This patient has hyperkalemia which is uncontrolled. We will monitor for arrhythmias with EKG or continuous telemetry. We will treat the hyperkalemia with IV fluids, follow up repeat BMP. The likely etiology of the hyperkalemia is COURTNEY.  The patients latest potassium has been reviewed and the results are listed below      Recent Labs   Lab 12/05/23  2134   K 5.2*               UTI (urinary  tract infection)  Continue with empiric IV antibiotics   Follow up CBC and CMP        Pressure injury of sacral region, unstageable  Wound care consulted        Aphasia  Noted        Cognitive communication deficit  Chronic issue         COURTNEY (acute kidney injury)  Patient with acute kidney injury/acute renal failure likely due to pre-renal azotemia due to dehydration COURTNEY is currently stable. Baseline creatinine  0.6  - Labs reviewed- Renal function/electrolytes with Estimated Creatinine Clearance: 25.4 mL/min (A) (based on SCr of 1.7 mg/dL (H)). according to latest data. Monitor urine output and serial BMP and adjust therapy as needed. Avoid nephrotoxins and renally dose meds for GFR listed above.     Continue IV fluids        Mixed hyperlipidemia  Continue outpatient statin        Hyperglycemia due to type 2 diabetes mellitus  Continue Levemir, started patient on sliding scale        Hypertension associated with diabetes  Continue outpatient antihypertensives, monitor blood pressure    VTE Risk Mitigation (From admission, onward)           Ordered     apixaban tablet 2.5 mg  2 times daily         12/05/23 2300     IP VTE HIGH RISK PATIENT  Once         12/05/23 2300     Place sequential compression device  Until discontinued         12/05/23 2300                        Department Hospital Medicine  [facility]  Yaazn Denise MD  Date of service: 12/10/2023

## 2023-12-11 ENCOUNTER — CLINICAL SUPPORT (OUTPATIENT)
Dept: CARDIOLOGY | Facility: HOSPITAL | Age: 63
DRG: 698 | End: 2023-12-11
Attending: STUDENT IN AN ORGANIZED HEALTH CARE EDUCATION/TRAINING PROGRAM
Payer: MEDICARE

## 2023-12-11 VITALS — BODY MASS INDEX: 27.47 KG/M2 | HEIGHT: 59 IN | WEIGHT: 136.25 LBS

## 2023-12-11 LAB
ANION GAP SERPL CALC-SCNC: 6 MMOL/L (ref 8–16)
ANISOCYTOSIS BLD QL SMEAR: SLIGHT
AORTIC ROOT ANNULUS: 2.9 CM
AORTIC VALVE CUSP SEPERATION: 1.8 CM
AV INDEX (PROSTH): 0.91
AV MEAN GRADIENT: 3 MMHG
AV PEAK GRADIENT: 6 MMHG
AV VALVE AREA BY VELOCITY RATIO: 2.12 CM²
AV VALVE AREA: 2.31 CM²
AV VELOCITY RATIO: 0.83
BACTERIA #/AREA URNS HPF: ABNORMAL /HPF
BACTERIA BLD CULT: ABNORMAL
BASOPHILS # BLD AUTO: 0.11 K/UL (ref 0–0.2)
BASOPHILS NFR BLD: 0.4 % (ref 0–1.9)
BILIRUB UR QL STRIP: NEGATIVE
BSA FOR ECHO PROCEDURE: 1.6 M2
BUN SERPL-MCNC: 21 MG/DL (ref 8–23)
CALCIUM SERPL-MCNC: 8.3 MG/DL (ref 8.7–10.5)
CHLORIDE SERPL-SCNC: 105 MMOL/L (ref 95–110)
CLARITY UR: ABNORMAL
CO2 SERPL-SCNC: 25 MMOL/L (ref 23–29)
COLOR UR: YELLOW
CREAT SERPL-MCNC: 0.6 MG/DL (ref 0.5–1.4)
CRP SERPL-MCNC: 55.1 MG/L (ref 0–8.2)
CV ECHO LV RWT: 0.39 CM
DIFFERENTIAL METHOD BLD: ABNORMAL
DOP CALC AO PEAK VEL: 1.25 M/S
DOP CALC AO VTI: 24.9 CM
DOP CALC LVOT AREA: 2.5 CM2
DOP CALC LVOT DIAMETER: 1.8 CM
DOP CALC LVOT PEAK VEL: 1.04 M/S
DOP CALC LVOT STROKE VOLUME: 57.48 CM3
DOP CALC MV VTI: 28 CM
DOP CALCLVOT PEAK VEL VTI: 22.6 CM
E WAVE DECELERATION TIME: 190 MSEC
E/A RATIO: 0.81
E/E' RATIO: 12.13 M/S
ECHO LV POSTERIOR WALL: 0.81 CM (ref 0.6–1.1)
EOSINOPHIL # BLD AUTO: 0.9 K/UL (ref 0–0.5)
EOSINOPHIL NFR BLD: 3.6 % (ref 0–8)
ERYTHROCYTE [DISTWIDTH] IN BLOOD BY AUTOMATED COUNT: 22.5 % (ref 11.5–14.5)
EST. GFR  (NO RACE VARIABLE): >60 ML/MIN/1.73 M^2
FRACTIONAL SHORTENING: 35 % (ref 28–44)
GLUCOSE SERPL-MCNC: 123 MG/DL (ref 70–110)
GLUCOSE SERPL-MCNC: 132 MG/DL (ref 70–110)
GLUCOSE SERPL-MCNC: 55 MG/DL (ref 70–110)
GLUCOSE SERPL-MCNC: 73 MG/DL (ref 70–110)
GLUCOSE SERPL-MCNC: 86 MG/DL (ref 70–110)
GLUCOSE SERPL-MCNC: 96 MG/DL (ref 70–110)
GLUCOSE UR QL STRIP: NEGATIVE
HCT VFR BLD AUTO: 20.5 % (ref 37–48.5)
HGB BLD-MCNC: 6.8 G/DL (ref 12–16)
HGB UR QL STRIP: ABNORMAL
HYALINE CASTS #/AREA URNS LPF: 36 /LPF
HYPOCHROMIA BLD QL SMEAR: ABNORMAL
IMM GRANULOCYTES # BLD AUTO: 0.93 K/UL (ref 0–0.04)
IMM GRANULOCYTES NFR BLD AUTO: 3.8 % (ref 0–0.5)
INTERVENTRICULAR SEPTUM: 0.78 CM (ref 0.6–1.1)
IVC DIAMETER: 1.21 CM
KETONES UR QL STRIP: ABNORMAL
LEFT INTERNAL DIMENSION IN SYSTOLE: 2.68 CM (ref 2.1–4)
LEFT VENTRICLE DIASTOLIC VOLUME INDEX: 47.58 ML/M2
LEFT VENTRICLE DIASTOLIC VOLUME: 74.7 ML
LEFT VENTRICLE MASS INDEX: 62 G/M2
LEFT VENTRICLE SYSTOLIC VOLUME INDEX: 16.9 ML/M2
LEFT VENTRICLE SYSTOLIC VOLUME: 26.5 ML
LEFT VENTRICULAR INTERNAL DIMENSION IN DIASTOLE: 4.11 CM (ref 3.5–6)
LEFT VENTRICULAR MASS: 96.92 G
LEUKOCYTE ESTERASE UR QL STRIP: ABNORMAL
LV LATERAL E/E' RATIO: 11.38 M/S
LV SEPTAL E/E' RATIO: 13 M/S
LVOT MG: 2 MMHG
LVOT MV: 0.72 CM/S
LYMPHOCYTES # BLD AUTO: 2.7 K/UL (ref 1–4.8)
LYMPHOCYTES NFR BLD: 11 % (ref 18–48)
MAGNESIUM SERPL-MCNC: 2 MG/DL (ref 1.6–2.6)
MCH RBC QN AUTO: 25.3 PG (ref 27–31)
MCHC RBC AUTO-ENTMCNC: 33.2 G/DL (ref 32–36)
MCV RBC AUTO: 76 FL (ref 82–98)
MICROSCOPIC COMMENT: ABNORMAL
MONOCYTES # BLD AUTO: 1.9 K/UL (ref 0.3–1)
MONOCYTES NFR BLD: 7.6 % (ref 4–15)
MV MEAN GRADIENT: 2 MMHG
MV PEAK A VEL: 1.13 M/S
MV PEAK E VEL: 0.91 M/S
MV PEAK GRADIENT: 5 MMHG
MV VALVE AREA BY CONTINUITY EQUATION: 2.05 CM2
NEUTROPHILS # BLD AUTO: 18.2 K/UL (ref 1.8–7.7)
NEUTROPHILS NFR BLD: 73.6 % (ref 38–73)
NITRITE UR QL STRIP: NEGATIVE
NRBC BLD-RTO: 0 /100 WBC
PH UR STRIP: 8 [PH] (ref 5–8)
PHOSPHATE SERPL-MCNC: 2.7 MG/DL (ref 2.7–4.5)
PISA TR MAX VEL: 1.38 M/S
PLATELET # BLD AUTO: 582 K/UL (ref 150–450)
PLATELET BLD QL SMEAR: ABNORMAL
PMV BLD AUTO: 10.9 FL (ref 9.2–12.9)
POIKILOCYTOSIS BLD QL SMEAR: SLIGHT
POLYCHROMASIA BLD QL SMEAR: ABNORMAL
POTASSIUM SERPL-SCNC: 4.2 MMOL/L (ref 3.5–5.1)
PROCALCITONIN SERPL IA-MCNC: 4.18 NG/ML (ref 0–0.5)
PROT UR QL STRIP: ABNORMAL
PV MV: 0.74 M/S
PV PEAK GRADIENT: 5 MMHG
PV PEAK VELOCITY: 1.07 M/S
RA PRESSURE ESTIMATED: 3 MMHG
RBC # BLD AUTO: 2.69 M/UL (ref 4–5.4)
RBC #/AREA URNS HPF: 61 /HPF (ref 0–4)
RV TB RVSP: 4 MMHG
RV TISSUE DOPPLER FREE WALL SYSTOLIC VELOCITY 1 (APICAL 4 CHAMBER VIEW): 13.9 CM/S
SODIUM SERPL-SCNC: 136 MMOL/L (ref 136–145)
SP GR UR STRIP: 1.01 (ref 1–1.03)
SQUAMOUS #/AREA URNS HPF: 0 /HPF
TDI LATERAL: 0.08 M/S
TDI SEPTAL: 0.07 M/S
TDI: 0.08 M/S
TR MAX PG: 8 MMHG
TRICUSPID ANNULAR PLANE SYSTOLIC EXCURSION: 2.17 CM
TV REST PULMONARY ARTERY PRESSURE: 11 MMHG
URN SPEC COLLECT METH UR: ABNORMAL
UROBILINOGEN UR STRIP-ACNC: NEGATIVE EU/DL
VANCOMYCIN SERPL-MCNC: 18.2 UG/ML
VANCOMYCIN TROUGH SERPL-MCNC: 26 UG/ML
WBC # BLD AUTO: 24.74 K/UL (ref 3.9–12.7)
WBC #/AREA URNS HPF: >100 /HPF (ref 0–5)
YEAST URNS QL MICRO: ABNORMAL
Z-SCORE OF LEFT VENTRICULAR DIMENSION IN END DIASTOLE: -0.93
Z-SCORE OF LEFT VENTRICULAR DIMENSION IN END SYSTOLE: -0.34

## 2023-12-11 PROCEDURE — 25000242 PHARM REV CODE 250 ALT 637 W/ HCPCS: Performed by: STUDENT IN AN ORGANIZED HEALTH CARE EDUCATION/TRAINING PROGRAM

## 2023-12-11 PROCEDURE — 81001 URINALYSIS AUTO W/SCOPE: CPT | Performed by: STUDENT IN AN ORGANIZED HEALTH CARE EDUCATION/TRAINING PROGRAM

## 2023-12-11 PROCEDURE — 25000003 PHARM REV CODE 250: Performed by: INTERNAL MEDICINE

## 2023-12-11 PROCEDURE — 99233 SBSQ HOSP IP/OBS HIGH 50: CPT | Mod: FS,,, | Performed by: NURSE PRACTITIONER

## 2023-12-11 PROCEDURE — 86140 C-REACTIVE PROTEIN: CPT | Performed by: STUDENT IN AN ORGANIZED HEALTH CARE EDUCATION/TRAINING PROGRAM

## 2023-12-11 PROCEDURE — 63600175 PHARM REV CODE 636 W HCPCS: Performed by: INTERNAL MEDICINE

## 2023-12-11 PROCEDURE — C1751 CATH, INF, PER/CENT/MIDLINE: HCPCS

## 2023-12-11 PROCEDURE — 25500020 PHARM REV CODE 255: Performed by: STUDENT IN AN ORGANIZED HEALTH CARE EDUCATION/TRAINING PROGRAM

## 2023-12-11 PROCEDURE — 93005 ELECTROCARDIOGRAM TRACING: CPT | Performed by: GENERAL PRACTICE

## 2023-12-11 PROCEDURE — 87040 BLOOD CULTURE FOR BACTERIA: CPT | Performed by: STUDENT IN AN ORGANIZED HEALTH CARE EDUCATION/TRAINING PROGRAM

## 2023-12-11 PROCEDURE — 84100 ASSAY OF PHOSPHORUS: CPT | Performed by: INTERNAL MEDICINE

## 2023-12-11 PROCEDURE — 25000003 PHARM REV CODE 250: Performed by: STUDENT IN AN ORGANIZED HEALTH CARE EDUCATION/TRAINING PROGRAM

## 2023-12-11 PROCEDURE — 63600175 PHARM REV CODE 636 W HCPCS: Performed by: STUDENT IN AN ORGANIZED HEALTH CARE EDUCATION/TRAINING PROGRAM

## 2023-12-11 PROCEDURE — 80202 ASSAY OF VANCOMYCIN: CPT | Mod: 91 | Performed by: STUDENT IN AN ORGANIZED HEALTH CARE EDUCATION/TRAINING PROGRAM

## 2023-12-11 PROCEDURE — 87086 URINE CULTURE/COLONY COUNT: CPT | Performed by: STUDENT IN AN ORGANIZED HEALTH CARE EDUCATION/TRAINING PROGRAM

## 2023-12-11 PROCEDURE — 25000003 PHARM REV CODE 250: Performed by: NURSE PRACTITIONER

## 2023-12-11 PROCEDURE — 94761 N-INVAS EAR/PLS OXIMETRY MLT: CPT

## 2023-12-11 PROCEDURE — 83735 ASSAY OF MAGNESIUM: CPT | Performed by: STUDENT IN AN ORGANIZED HEALTH CARE EDUCATION/TRAINING PROGRAM

## 2023-12-11 PROCEDURE — 99900035 HC TECH TIME PER 15 MIN (STAT)

## 2023-12-11 PROCEDURE — 93306 TTE W/DOPPLER COMPLETE: CPT | Mod: 26,,, | Performed by: INTERNAL MEDICINE

## 2023-12-11 PROCEDURE — 84145 PROCALCITONIN (PCT): CPT | Performed by: STUDENT IN AN ORGANIZED HEALTH CARE EDUCATION/TRAINING PROGRAM

## 2023-12-11 PROCEDURE — 93306 ECHO (CUPID ONLY): ICD-10-PCS | Mod: 26,,, | Performed by: INTERNAL MEDICINE

## 2023-12-11 PROCEDURE — 87040 BLOOD CULTURE FOR BACTERIA: CPT | Mod: 59 | Performed by: INTERNAL MEDICINE

## 2023-12-11 PROCEDURE — 80202 ASSAY OF VANCOMYCIN: CPT | Performed by: STUDENT IN AN ORGANIZED HEALTH CARE EDUCATION/TRAINING PROGRAM

## 2023-12-11 PROCEDURE — 85025 COMPLETE CBC W/AUTO DIFF WBC: CPT | Performed by: STUDENT IN AN ORGANIZED HEALTH CARE EDUCATION/TRAINING PROGRAM

## 2023-12-11 PROCEDURE — 93010 ELECTROCARDIOGRAM REPORT: CPT | Mod: ,,, | Performed by: GENERAL PRACTICE

## 2023-12-11 PROCEDURE — 12000002 HC ACUTE/MED SURGE SEMI-PRIVATE ROOM

## 2023-12-11 PROCEDURE — 80048 BASIC METABOLIC PNL TOTAL CA: CPT | Performed by: STUDENT IN AN ORGANIZED HEALTH CARE EDUCATION/TRAINING PROGRAM

## 2023-12-11 PROCEDURE — 94640 AIRWAY INHALATION TREATMENT: CPT

## 2023-12-11 PROCEDURE — 93306 TTE W/DOPPLER COMPLETE: CPT

## 2023-12-11 PROCEDURE — 94760 N-INVAS EAR/PLS OXIMETRY 1: CPT

## 2023-12-11 PROCEDURE — 63600175 PHARM REV CODE 636 W HCPCS: Performed by: NURSE PRACTITIONER

## 2023-12-11 PROCEDURE — 36415 COLL VENOUS BLD VENIPUNCTURE: CPT | Performed by: STUDENT IN AN ORGANIZED HEALTH CARE EDUCATION/TRAINING PROGRAM

## 2023-12-11 RX ORDER — POLYETHYLENE GLYCOL 3350 17 G/17G
17 POWDER, FOR SOLUTION ORAL 2 TIMES DAILY
Status: DISCONTINUED | OUTPATIENT
Start: 2023-12-11 | End: 2023-12-11

## 2023-12-11 RX ORDER — POLYETHYLENE GLYCOL 3350 17 G/17G
17 POWDER, FOR SOLUTION ORAL DAILY
Status: DISCONTINUED | OUTPATIENT
Start: 2023-12-12 | End: 2023-12-19 | Stop reason: HOSPADM

## 2023-12-11 RX ORDER — VANCOMYCIN HCL IN 5 % DEXTROSE 1G/250ML
1000 PLASTIC BAG, INJECTION (ML) INTRAVENOUS
Status: DISCONTINUED | OUTPATIENT
Start: 2023-12-11 | End: 2023-12-15

## 2023-12-11 RX ADMIN — ATORVASTATIN CALCIUM 80 MG: 40 TABLET, FILM COATED ORAL at 08:12

## 2023-12-11 RX ADMIN — TAMSULOSIN HYDROCHLORIDE 0.4 MG: 0.4 CAPSULE ORAL at 08:12

## 2023-12-11 RX ADMIN — MEROPENEM 1 G: 1 INJECTION, POWDER, FOR SOLUTION INTRAVENOUS at 08:12

## 2023-12-11 RX ADMIN — APIXABAN 2.5 MG: 2.5 TABLET, FILM COATED ORAL at 08:12

## 2023-12-11 RX ADMIN — MEROPENEM 1 G: 1 INJECTION, POWDER, FOR SOLUTION INTRAVENOUS at 05:12

## 2023-12-11 RX ADMIN — IPRATROPIUM BROMIDE 0.5 MG: 0.5 SOLUTION RESPIRATORY (INHALATION) at 01:12

## 2023-12-11 RX ADMIN — METOPROLOL TARTRATE 25 MG: 25 TABLET, FILM COATED ORAL at 08:12

## 2023-12-11 RX ADMIN — IOHEXOL 100 ML: 350 INJECTION, SOLUTION INTRAVENOUS at 12:12

## 2023-12-11 RX ADMIN — IPRATROPIUM BROMIDE 0.5 MG: 0.5 SOLUTION RESPIRATORY (INHALATION) at 08:12

## 2023-12-11 RX ADMIN — MICAFUNGIN SODIUM 100 MG: 100 INJECTION, POWDER, LYOPHILIZED, FOR SOLUTION INTRAVENOUS at 08:12

## 2023-12-11 RX ADMIN — AMLODIPINE BESYLATE 10 MG: 5 TABLET ORAL at 08:12

## 2023-12-11 RX ADMIN — POLYETHYLENE GLYCOL 3350 17 G: 17 POWDER, FOR SOLUTION ORAL at 08:12

## 2023-12-11 RX ADMIN — MEROPENEM 1 G: 1 INJECTION, POWDER, FOR SOLUTION INTRAVENOUS at 12:12

## 2023-12-11 RX ADMIN — INSULIN DETEMIR 35 UNITS: 100 INJECTION, SOLUTION SUBCUTANEOUS at 08:12

## 2023-12-11 RX ADMIN — DEXTROSE MONOHYDRATE 12.5 G: 25 INJECTION, SOLUTION INTRAVENOUS at 04:12

## 2023-12-11 RX ADMIN — ASPIRIN 81 MG 81 MG: 81 TABLET ORAL at 08:12

## 2023-12-11 RX ADMIN — LEVETIRACETAM 500 MG: 100 SOLUTION ORAL at 08:12

## 2023-12-11 RX ADMIN — IPRATROPIUM BROMIDE 0.5 MG: 0.5 SOLUTION RESPIRATORY (INHALATION) at 12:12

## 2023-12-11 RX ADMIN — VANCOMYCIN HYDROCHLORIDE 1000 MG: 1 INJECTION, POWDER, LYOPHILIZED, FOR SOLUTION INTRAVENOUS at 06:12

## 2023-12-11 RX ADMIN — FAMOTIDINE 20 MG: 20 TABLET ORAL at 08:12

## 2023-12-11 NOTE — CARE UPDATE
12/11/23 0803   Patient Assessment/Suction   Level of Consciousness (AVPU) alert   Respiratory Effort Normal;Unlabored   Expansion/Accessory Muscles/Retractions no use of accessory muscles;no retractions;expansion symmetric   All Lung Fields Breath Sounds clear;diminished   Rhythm/Pattern, Respiratory unlabored;pattern regular;depth regular;chest wiggle adequate;no shortness of breath reported   Cough Frequency no cough   PRE-TX-O2   Device (Oxygen Therapy) room air   SpO2 97 %   Pulse Oximetry Type Intermittent   $ Pulse Oximetry - Multiple Charge Pulse Oximetry - Multiple   Pulse 88   Resp 18   Aerosol Therapy   $ Aerosol Therapy Charges Aerosol Treatment   Daily Review of Necessity (SVN) completed   Respiratory Treatment Status (SVN) given   Treatment Route (SVN) oxygen;mask   Patient Position (SVN) HOB elevated   Post Treatment Assessment (SVN) increased aeration   Signs of Intolerance (SVN) none   Breath Sounds Post-Respiratory Treatment   Throughout All Fields Post-Treatment All Fields   Throughout All Fields Post-Treatment aeration increased   Post-treatment Heart Rate (beats/min) 90   Post-treatment Resp Rate (breaths/min) 18   Respiratory Evaluation   $ Care Plan Tech Time 15 min

## 2023-12-11 NOTE — CARE UPDATE
12/10/23 2058   Patient Assessment/Suction   Level of Consciousness (AVPU) alert   Respiratory Effort Normal;Unlabored   Expansion/Accessory Muscles/Retractions no use of accessory muscles;expansion symmetric;no retractions   All Lung Fields Breath Sounds clear;equal bilaterally;diminished   Rhythm/Pattern, Respiratory hiccoughs;unlabored;pattern regular;no shortness of breath reported   Cough Frequency no cough   PRE-TX-O2   Device (Oxygen Therapy) room air   SpO2 97 %   Pulse Oximetry Type Intermittent   $ Pulse Oximetry - Single Charge Pulse Oximetry - Single   Pulse 91   Resp 17   Positioning   Head of Bed (HOB) Positioning HOB at 30-45 degrees   Aerosol Therapy   $ Aerosol Therapy Charges Aerosol Treatment   Daily Review of Necessity (SVN) completed   Respiratory Treatment Status (SVN) given   Treatment Route (SVN) mask;oxygen;blow by   Patient Position (SVN) HOB elevated   Post Treatment Assessment (SVN) increased aeration   Signs of Intolerance (SVN) none   Breath Sounds Post-Respiratory Treatment   Throughout All Fields Post-Treatment All Fields   Throughout All Fields Post-Treatment aeration increased   Post-treatment Heart Rate (beats/min) 0   Post-treatment Resp Rate (breaths/min) 18   Education   $ Education Bronchodilator;15 min   Respiratory Evaluation   $ Care Plan Tech Time 15 min   $ Eval/Re-eval Charges Re-evaluation   Evaluation For New Orders

## 2023-12-11 NOTE — PROGRESS NOTES
[facility] Medicine  Progress Note    Patient name: Steff Johnson  MRN: [id number]  Admit Date: 12/5/2023   LOS: 6 days     SUBJECTIVE:     Principal problem: Severe sepsis    HPI:  Patient is a 63-year-old female with history of severe CVA with aphasia, upper and lower extremity weakness, bed-bound, cognitive impairment, hypertension, hyperlipidemia, type 2 diabetes, COPD presents to the emergency room for concerns of hematuria and hypoxia.  Patient nonverbal at baseline, history provided by ED physician.  Tried to contact daughter, was unsuccessful.  Per ED, patient presented from [facility], staff noticed bloody urine output from her chronic indwelling Mathur catheter as well as hypoxia.  Non-rebreather was placed on patient and this improved her oxygenation saturation.  Brought to ED by EMS.  Per ED physician patient did have episode of emesis, likely aspirating.     On admission temp 100.4, heart rate 111, O2 89%, WBC 40.96, hemoglobin 9.6, potassium 5.2, creatinine 1.7, lactic 3.8, procalcitonin 133.671.  Review of CXR did not show any obvious consolidations.  UA consistent with UTI.      Interval History:      12/11- c. Glabrata on blood pcr.  Started on micafungin.  TTE pending read, GT ordered, abd ct with hydro and fluid collection in GB fossa.      12/10-  tmax 99.8 overnight, feels warm on exam.  May need to get GT to r/o vegetation.     12/9- patient had blood cx + yesterday GPC.  Started on vanc.  Repeat blood cx.  May need to consider echo to eval for vegetation.     12/8- axillary temp 99.5, wbc 22, blood cx sensitivities still pending.      12/7- she is still running low grade temp overnight, wbc improving but still elevated at 24. Continue IV abx.   Wound and ID following    Scheduled Meds:   amLODIPine  10 mg Oral Daily    apixaban  2.5 mg Per G Tube BID    aspirin  81 mg Per G Tube Daily    atorvastatin  80 mg Per G Tube QHS    famotidine  20 mg Oral Daily     insulin detemir U-100  35 Units Subcutaneous Daily    ipratropium  0.5 mg Nebulization Q6H    levetiracetam  500 mg Per G Tube BID    meropenem (MERREM) IVPB  1 g Intravenous Q8H    metoprolol tartrate  25 mg Per G Tube BID    micafungin (MYCAMINE) IVPB  100 mg Intravenous Q24H    polyethylene glycol  17 g Per G Tube Daily    tamsulosin  0.4 mg Oral Daily     Continuous Infusions:   lactated ringers 125 mL/hr at 12/10/23 0953     PRN Meds:acetaminophen, albuterol-ipratropium, dextrose 50%, dextrose 50%, glucagon (human recombinant), glucose, glucose, insulin aspart U-100, magnesium oxide, magnesium oxide, ondansetron, potassium bicarbonate, potassium bicarbonate, potassium bicarbonate, potassium, sodium phosphates, potassium, sodium phosphates, potassium, sodium phosphates, sodium chloride 0.9%, Pharmacy to dose Vancomycin consult **AND** vancomycin - pharmacy to dose    Review of patient's allergies indicates:  No Known Allergies    Review of Systems: As per interval history    OBJECTIVE:     Vital Signs (Most Recent)  Temp: 97.7 °F (36.5 °C) (12/11/23 1236)  Pulse: 80 (12/11/23 1322)  Resp: 18 (12/11/23 1322)  BP: 136/61 (12/11/23 1236)  SpO2: 95 % (12/11/23 1322)    Vital Signs Range (Last 24H):  Temp:  [97.7 °F (36.5 °C)-99.2 °F (37.3 °C)]   Pulse:  [79-91]   Resp:  [16-18]   BP: (136-162)/(61-80)   SpO2:  [94 %-98 %]     I & O (Last 24H):  Intake/Output Summary (Last 24 hours) at 12/11/2023 1556  Last data filed at 12/11/2023 1237  Gross per 24 hour   Intake 1155 ml   Output 2701 ml   Net -1546 ml         Physical Exam:    Gen- non-verbal, min responsive.  Eyes open, doesn't track  CV- RRR  Pulm- even respirations, no tachypnea, accessory use.    GI- soft, non-tender.  PEG LLQ  - deferred  Skin- warm, dry.        Laboratory:  I have reviewed all pertinent lab results within the past 24 hours.      ASSESSMENT/PLAN:         Active Hospital Problems    Diagnosis  POA    *Severe sepsis [A41.9, R65.20]  Yes     ESBL (extended spectrum beta-lactamase) producing bacteria infection [A49.9, Z16.12]  Yes    Acute respiratory failure with hypoxia [J96.01]  Yes    Bacteremia due to Proteus species [R78.81, B96.4]  Yes    Bedridden [Z74.01]  Not Applicable    Blisters of multiple sites [R23.8]  Yes    Chronic indwelling Mathur catheter [Z97.8]  Not Applicable    Cognitive impairment [R41.89]  Yes    Hydronephrosis [N13.30]  Yes    Hypoxia [R09.02]  Yes    Pressure injury of sacral region, stage 3 [L89.153]  Yes    Aspiration pneumonia of right lung due to vomit [J69.0]  Yes    Leucocytosis [D72.829]  Yes    UTI (urinary tract infection) [N39.0]  Yes    Hyperkalemia [E87.5]  Yes    Pressure injury of sacral region, unstageable [L89.150]  Yes    Aphasia [R47.01]  Yes    Cognitive communication deficit [R41.841]  Yes    Combined forms of age-related cataract, bilateral [H25.813]  Yes    COURTNEY (acute kidney injury) [N17.9]  Yes    CVA (cerebral vascular accident) [I63.9]  Yes    Mixed hyperlipidemia [E78.2]  Yes    Hyperglycemia due to type 2 diabetes mellitus [E11.65]  Yes    Hypertension associated with diabetes [E11.59, I15.2]  Yes      Resolved Hospital Problems   No resolved problems to display.         Plan:     Severe sepsis, improved  Repeat lactic down to 1.2  Continue empiric abx  Follow up cx s/s   12/8 bcx + gpc  Start vanc, repeat blood cx  PCR with c. Glabrata  Micafungin started  echo/GT ordered  CT with gb fossa fluid, BL hydro  Consult IR and urology  Follow up with ID    CVA (cerebral vascular accident)  History of CVA, nonverbal and bed-bound at baseline  Has PEG tube, nutrition consulted for tube feedings.    Has chronic indwelling catheter, replaced in the emergency room.        Hyperkalemia  This patient has hyperkalemia which is uncontrolled. We will monitor for arrhythmias with EKG or continuous telemetry. We will treat the hyperkalemia with IV fluids, follow up repeat BMP. The likely etiology of the hyperkalemia  is COURTNEY.  The patients latest potassium has been reviewed and the results are listed below      Recent Labs   Lab 12/05/23 2134   K 5.2*               UTI (urinary tract infection)  Continue with empiric IV antibiotics   Follow up CBC and CMP        Pressure injury of sacral region, unstageable  Wound care consulted        Aphasia  Noted        Cognitive communication deficit  Chronic issue         COUTRNEY (acute kidney injury)  Patient with acute kidney injury/acute renal failure likely due to pre-renal azotemia due to dehydration COURTNEY is currently stable. Baseline creatinine  0.6  - Labs reviewed- Renal function/electrolytes with Estimated Creatinine Clearance: 25.4 mL/min (A) (based on SCr of 1.7 mg/dL (H)). according to latest data. Monitor urine output and serial BMP and adjust therapy as needed. Avoid nephrotoxins and renally dose meds for GFR listed above.     Continue IV fluids        Mixed hyperlipidemia  Continue outpatient statin        Hyperglycemia due to type 2 diabetes mellitus  Continue Levemir, started patient on sliding scale        Hypertension associated with diabetes  Continue outpatient antihypertensives, monitor blood pressure    VTE Risk Mitigation (From admission, onward)           Ordered     apixaban tablet 2.5 mg  2 times daily         12/05/23 2300     IP VTE HIGH RISK PATIENT  Once         12/05/23 2300     Place sequential compression device  Until discontinued         12/05/23 2300                        Department Hospital Medicine  [facility]  Yazan Denise MD  Date of service: 12/11/2023

## 2023-12-11 NOTE — CONSULTS
63-year-old female with history of strokes aphasia cognitive impairment COPD continues to have leukocytosis    CT scan now demonstrating bilateral hydronephrosis  Significant change from 4 days prior    Would consider cystoscopic evaluation to include retrograde pyelography and possible stent placement

## 2023-12-11 NOTE — PROGRESS NOTES
Pharmacokinetic Assessment Follow Up: IV Vancomycin    Patient brief summary:  Steff Johnson is a 63 y.o. female initiated on antimicrobial therapy with IV Vancomycin for treatment of Bacteremia    The patient's current regimen is 1000 mg IV Q 12 hours      Actual Body Weight = 61.8 kg (136 lb 4.3 oz).    Renal Function:   Estimated Creatinine Clearance: 79.7 mL/min (based on SCr of 0.6 mg/dL).      Vancomycin serum concentration assessment(s):    The trough level was drawn correctly and can be used to guide therapy at this time. The measurement is above the desired definitive target range of 15 to 20 mcg/mL.    Vancomycin Regimen Plan:    Re-dose when the random level is less than 20 mcg/mL, next level to be drawn at 1500 on 12/11    Drug levels (last 3 results):  Recent Labs   Lab Result Units 12/11/23  0629   Vancomycin-Trough ug/mL 26.0*          Pharmacy will continue to follow and monitor vancomycin.    Please contact pharmacy at extension [phone] for questions regarding this assessment.    Thank you for the consult,   Irving Blum

## 2023-12-11 NOTE — PROCEDURES
18 Gx 10cm PowerGlide Midline placed to pts RIGHT Cephalic vein with the use of ultrasound guidance.    Ultrasound guidance: yes  Vessel Caliber: large and patent, compressibility normal  Needle advanced into vessel with real time Ultrasound guidance.  Guidewire confirmed in vessel.  Sterile sheath used.  Sterile dressing applied  Dressing dated   Education provided to patient re: proper maintenance of line- pt verbalized understanding  Limb alert applied.

## 2023-12-11 NOTE — PROGRESS NOTES
Formerly [facility], later [facility]  Department of Infectious Disease  Progress Note        PATIENT NAME: Steff Johnson  MRN: [id number]  TODAY'S DATE: 12/11/2023  ADMIT DATE: 12/5/2023  LENGTH OF STAY: 6 DAYS    PRINCIPLE PROBLEM: Severe sepsis    REASON FOR CONSULT:  Gram negative sepsis     INTERVAL HISTORY     12/11@1237 (Denette):  Patient seen and examined alone in her room.  She was lying in bed and opens eyes to voice.  She makes eye contact and if you ask her if she was doing okay she shakes her head yes and then stops responding and goes back to sleep.  At this point she refuses to follow any commands.  She had a PICC line paced to right upper extremity.  T-max 99.2° in last 24 hours.  T-max since admission 100.4.  WBC slightly elevated at 24.74, H&H decreased to date 6.8/20.5, platelets increased at 582, neutrophils 73.6%, no bands, BUN/CR improved at 21/0.6 with creatinine clearance 79.7.  CRP decreased from 293 to 55, procalcitonin decreased from 133 to 4.  Last vanc level 18 0.2, trough 26, dose of vancomycin adjusted.  Echocardiogram done today with technically difficult study.  Repeat UA with hematuria, pyuria and yeast though urine is quite clear with no sediment and urinary catheter.  12-5 and 12/6 blood cultures with Proteus mirabilis ESBL, 12/8 blood culture with Enterococcus faecalis and Candida glabrata.  12/10 and 12/11 blood culture pending.  CT abdomen and pelvis with contrast with hydronephrosis and hydroureter with no obstruction, gallbladder fluid collection with no biliary dilatation.     12/10/2023 Dr. Calvo covering for the weekend.  Patient looks much more comfortable today, compared to yesterday.  She is nonverbal as usual.  T-max 99.8°.  WBC worsened 20--24.8.  Blood cultures from 12/ 8 grew Enterococcus vancomycin was started yesterday.  Tonight there are morbid news.  C glabrata from blood cultures.    12/09/2023.  Dr. Calvo covering for the weekend.  Afebrile.  T-max 98.7° but she does feel  warm on physical exam.  I hope she is not about to spike a fever.    Nice drop in WBC 40.9--39--24.4--22.8--20.79.  Nice drop in procalcitonin 133--17.9   Nice drop in CRP:  293--143  Repeat blood cultures of 12/08/2023 are negative to date.  Hemoglobin 9.6--7.9--7.9--7.1--7.2.  Platelets are still elevated 577--492--474--421--495.  Catheter was exchanged on the 6th.  Urine is almost clear.    12/7@1246 (Danette):  Patient seen and examined in her room.  She was lying in bed and opens eyes to voice and initially makes eye contact then turns away.  She does not follow commands or move any extremities spontaneously.  T-max 100.4° at 7:00 p.m. yesterday.  WBC decreased to 24.48 from 30/9 0.98, yesterday 37% bands, none today, + left shift, BUN/CR improving at 40/0.9.  , lactic acid decreased to 1.2, procalcitonin 133.  MRSA screen was negative, vanc level 12.0.  Respiratory virus panel negative, 12/6 blood cultures negative, sputum culture not obtain, 12/5 blood cultures with 4 4 bottles positive for Proteus species.  Urine culture with Gram-negative sheryl non lactose .      Antibiotics (From admission, onward)      Start     Stop Route Frequency Ordered    12/09/23 1607  vancomycin - pharmacy to dose  (vancomycin IVPB (PEDS and ADULTS))        See Hyperspace for full Linked Orders Report.    -- IV pharmacy to manage frequency 12/09/23 1507    12/08/23 2100  meropenem 1 g in sodium chloride 0.9 % 100 mL IVPB (ready to mix system)        Note to Pharmacy: Original order: meropenem 1 g Q 8 hours    -- IV Every 8 hours (non-standard times) 12/08/23 1627    12/06/23 0900  mupirocin 2 % ointment         12/11/23 0859 Nasl 2 times daily 12/05/23 2308          ASSESSMENT and PLAN     1.  Sepsis with polymicrobial bacteremia - Proteus mirabilis ESBL, Enterococcus faecalis and Candida glabrata  -12/5 blood cultures x2 sets with 4 of 4 bottles positive for Proteus mirabilis ESBL  -12/6 blood cultures x2 sets 1/4  bottles positive for Proteus mirabilis ESBL  -12/8 blood cultures x2 sets with 1 bottle of 1 set positive for Enterococcus faecalis and 1 bottle of 1 set positive for budding yeast with Candida glabrata on [employer]  -12/10 blood culture x1 set no growth to date   -12/11 blood cultures x2 sets pending  -T-max 100.4°, 37% bands, WBC 40.96-->39.98-->24.48-->22.83-->20.79-->24.88-->24.74  -Procalcitonin 133.671-->17.936-->4.179  --->143-->55  -serum lactate 3.8-->3.0-->1.2  -Urine source? del rosario changed, decubitus appears clean  12/05/2023 urine culture Proteus mirabilis ESBL; 12/11 repeat urine cx pending UA with yeast, pyuria and hematuria  -Urine culture on prior visit, 10/24/2023 had Enterococcus faecalis.   -CT chest with right-greater-than-left bilateral opacities some improvement in right lower lobe and slight progression and left lower lobe.  -CT abdomen and pelvis with contrast from today shows ill-defined fluid collection within gallbladder fossa with no biliary dilatation, mildly enlarged right hilar lymph node, progression and left lower lobe, improvement in right lower lobe, unchanged masslike lesion long the anterior margin of the spleen, bilateral hydronephrosis and hydroureter without obstructing etiology with nondistended urinary catheter and diffuse body wall edema.  Possible small hip joint effusions    2.  Acute respiratory failure with hypoxia with possible right-sided aspiration pneumonia;                -on 1-2 L O2   -CT chest with patch really nodular opacities of the right upper lobe possibly aspiration pneumonitis and right middle and lower lobe with pneumonia and/or atelectasis  -Improving, on room air     3.  Impaired cognition and mobility s/p CVA living in SNF with chronic indwelling Del Rosario catheter and decubitus ulcer    4. Acute kidney injury on admission, resolved.  -renal ultrasound with probable mild right hydronephrosis no stones; she was seen by Urology on last admission for  mild right hydroureteronephrosis possibly due to neurogenic bladder and reflex versus obstruction from poorly compliant bladder   -CT abdomen and pelvis with shotty retroperitoneal lymph nodes, 1 mm calcification in distal left ureter, cholelithiasis  -Repeat CT abdomen and pelvis with contrast with bilateral hydronephrosis and hydroureter with no obstruction     5.  Chronic medical problems including diabetes mellitus, hypertension,  bed-bound, and COPD, anemia   -HGB 6.8/20.5; baseline around 8-9 but was 6.8 in October with 1u PRBC  on 10/26         RECOMMENDATIONS:  Continue meropenem  1 g IV every 8 hours PM ESBL  Continue vancomycin with pharmacy to dose, for Enterococcus species  Continue micafungin for C glabrata candidemia.    Repeat blood cultures until blood cultures negative  Monitor inflammatory markers, WBC and diff - all improving  Remove left PIV, PICC placed on 12/11  Wound care to all affected areas  Aspiration precautions   Recommend GT  Recommend recall Urology for hydronephrosis and hydroureter   Recommend consult to IR for gallbladder fluid collection  Transfusion per Hospitalist      KWAKU Cochran, Clinical Pharmacist, Hospitalist    Reviewed and ordered labs, micro and diagnostics, prescription drug management    Thank you for this consult. Please send [employer] secure chat with any questions.       SUBJECTIVE    Review of Systems  Unable to obtain due to aphasia    OBJECTIVE   Temp:  [97.7 °F (36.5 °C)-99.2 °F (37.3 °C)] 97.7 °F (36.5 °C)  Pulse:  [79-91] 80  Resp:  [16-18] 18  SpO2:  [94 %-98 %] 95 %  BP: (136-162)/(61-80) 136/61  Temp:  [97.7 °F (36.5 °C)-99.2 °F (37.3 °C)]   Temp: 97.7 °F (36.5 °C) (12/11/23 1236)  Pulse: 80 (12/11/23 1322)  Resp: 18 (12/11/23 1322)  BP: 136/61 (12/11/23 1236)  SpO2: 95 % (12/11/23 1322)    Intake/Output Summary (Last 24 hours) at 12/11/2023 1338  Last data filed at 12/11/2023 1237  Gross per 24 hour   Intake 1155 ml   Output 2701 ml   Net -1546 ml         "  Physical Exam  General:  Opens eyes to voice, makes eye contact, shakes her head yes to question, "are you doing ok?" then closes eyes and does not respond.  Eyes: Eyes with no icterus or injection.  No exudates.  Ears:  Hearing seems grossly intact  Nose: Nares patent  Mouth: Refuses to open mouth.  Neck: No tenderness to palpation.  Cardiovascular: Regular rate and rhythm, no murmurs, Mild generalized edema  Respiratory:  Clear anteriorly, on RA, no tachypnea or increased work of breathing.   Gastrointestinal:  Soft and obese with active bowel sounds, no distention.  Peg tube clamped. Minimal redness, no drainage at PEG site.  Genitourinary:  Urinary catheter with clear urine.   Musculoskeletal:  No spontaneous movement, positioned on left side propped up on pillows, heel protectors in place.   Skin: Pale, sacral wound with no surrounding erythema.  Neuro: Poor attention but initially seemed responsive, then disinterested.  Psych:  No agitation.  VAD: PIV left upper extremity, PICC rt upper extremity  Isolation: Contact for ESBL    WOUNDS    12/6:                         Significant Labs: All pertinent labs within the past 24 hours have been reviewed.    CBC LAST 7 DAYS  Recent Labs   Lab 12/05/23  2134 12/06/23  0526 12/06/23  0526 12/07/23  0508 12/08/23  0544 12/09/23  0547 12/10/23  0626 12/11/23  0629   WBC 40.96* 39.98*  --  24.48* 22.83* 20.79* 24.88* 24.74*   RBC 3.74* 3.08*  --  3.04* 2.82* 2.88* 2.85* 2.69*   HGB 9.6* 7.9*  --  7.9* 7.1* 7.2* 7.3* 6.8*   HCT 28.7* 23.4*  --  23.0* 21.8* 21.9* 21.9* 20.5*   MCV 77* 76*  --  76* 77* 76* 77* 76*   MCH 25.7* 25.6*  --  26.0* 25.2* 25.0* 25.6* 25.3*   MCHC 33.4 33.8  --  34.3 32.6 32.9 33.3 33.2   RDW 21.8* 21.4*  --  21.5* 22.0* 22.3* 22.9* 22.5*   * 492*  --  474* 421 495* 383 582*   MPV 10.4 10.7  --  10.3 10.9 10.7 11.5 10.9   GRAN 69.0 49.0  --  87.7*  21.4* 77.5*  17.7* 72.0 76.0* 73.6*  18.2*   LYMPH 5.0* 5.0*   < > 5.3*  1.3 10.2*  2.3 " "12.0* 14.0*  CANCELED 11.0*  2.7   MONO 5.0 5.0   < > 4.6  1.1* 8.2  1.9* 10.0 5.0  CANCELED 7.6  1.9*   BASO  --   --   --  0.08 0.08  --  CANCELED 0.11   NRBC 0 0  --  0 0 0 0 0    < > = values in this interval not displayed.         CHEMISTRY LAST 7 DAYS  Recent Labs   Lab 12/05/23 2028 12/05/23 2134 12/06/23 0526 12/07/23  0508 12/08/23  0544 12/09/23  0547 12/10/23  0626 12/11/23  0629   NA  --  135* 135* 139 142 143 140 136   K  --  5.2* 4.4 3.9 3.8 4.0 4.3 4.2   CL  --  94* 99 104 108 109 108 105   CO2  --  28 28 25 27 28 26 25   ANIONGAP  --  13 8 10 7* 6* 6* 6*   BUN  --  67* 61* 40* 32* 25* 24* 21   CREATININE  --  1.7* 1.5* 0.9 0.7 0.6 0.6 0.6   GLU  --  221* 216* 186* 120* 110 140* 123*   CALCIUM  --  9.6 8.4* 9.2 9.1 8.9 8.5* 8.3*   PH 7.540*  --   --   --   --   --   --   --    MG  --   --  2.2 2.0 1.8 1.8 2.0 2.0   ALBUMIN  --  3.3*  --   --   --   --   --   --    PROT  --  7.4  --   --   --   --   --   --    ALKPHOS  --  98  --   --   --   --   --   --    ALT  --  30  --   --   --   --   --   --    AST  --  35  --   --   --   --   --   --    BILITOT  --  0.5  --   --   --   --   --   --          Estimated Creatinine Clearance: 79.7 mL/min (based on SCr of 0.6 mg/dL).    INFLAMMATORY/PROCAL  LAST 7 DAYS  Recent Labs   Lab 12/05/23 2134 12/06/23  0526 12/09/23  0547 12/11/23  0937   PROCAL 133.671*  --  17.936* 4.179*   CRP  --  293.0* 143.3*  --        No results found for: "ESR"  CRP   Date Value Ref Range Status   12/09/2023 143.3 (H) 0.0 - 8.2 mg/L Final   12/06/2023 293.0 (H) 0.0 - 8.2 mg/L Final   10/25/2023 92.6 (H) 0.0 - 8.2 mg/L Final   06/15/2023 0.65 <0.76 mg/dL Final   11/23/2021 3.21 (H) <0.76 mg/dL Final   07/04/2021 0.34 <0.76 mg/dL Final   07/03/2021 0.87 (H) <0.76 mg/dL Final       PRIOR  MICROBIOLOGY:    Susceptibility data from last 90 days.  Collected Specimen Info Organism Amp/Sulbactam Ampicillin Cefazolin Cefepime Ceftriaxone Ciprofloxacin Ertapenem Genatmicin Synergy " Screen Gentamicin Levofloxacin Meropenem Nitrofurantoin PIPERACILLIN/TAZO SUSCEPTIBILITY   12/08/23 Blood Enterococcus faecalis   S       R        12/06/23 Blood from Antecubital, Right Hand Proteus mirabilis ESBL                12/06/23 Blood from Peripheral, Right Arm Proteus mirabilis ESBL                12/05/23 Blood Proteus mirabilis ESBL                12/05/23 Blood Proteus mirabilis ESBL  S  R  R  R  R  R  S   S  R  S   S   12/05/23 Urine Proteus mirabilis ESBL  S  R  R  R  R  R  S   S  R  S  R  S   10/24/23 Urine from Clean Catch Enterococcus faecalis   S           S    10/24/23 Blood from Antecubital, Right No growth after 5 days.                10/24/23 Blood from Antecubital, Left No growth after 5 days.                  Collected Specimen Info Organism Tetracycline Tobramycin Trimeth/Sulfa Vancomycin   12/08/23 Blood Enterococcus faecalis     S   12/06/23 Blood from Antecubital, Right Hand Proteus mirabilis ESBL       12/06/23 Blood from Peripheral, Right Arm Proteus mirabilis ESBL       12/05/23 Blood Proteus mirabilis ESBL       12/05/23 Blood Proteus mirabilis ESBL  R  S  R    12/05/23 Urine Proteus mirabilis ESBL  R  S  R    10/24/23 Urine from Clean Catch Enterococcus faecalis  S    S   10/24/23 Blood from Antecubital, Right No growth after 5 days.       10/24/23 Blood from Antecubital, Left No growth after 5 days.               LAST 7 DAYS MICROBIOLOGY   Microbiology Results (last 7 days)       Procedure Component Value Units Date/Time    Urine culture [[id number]] Collected: 12/11/23 0938    Order Status: No result Specimen: Urine Updated: 12/11/23 1102    Blood culture [[id number]] Collected: 12/11/23 0937    Order Status: Sent Specimen: Blood Updated: 12/11/23 1034    Blood culture [[id number]] Collected: 12/10/23 0626    Order Status: Completed Specimen: Blood Updated: 12/11/23 0832     Blood Culture, Routine No Growth to date      No Growth to date    Blood culture [[id number]] Collected:  12/11/23 0628    Order Status: Sent Specimen: Blood Updated: 12/11/23 0702    Blood culture [[id number]]  (Abnormal)  (Susceptibility) Collected: 12/08/23 1208    Order Status: Completed Specimen: Blood Updated: 12/11/23 0639     Blood Culture, Routine Gram stain aer bottle: Gram positive cocci      Results called to and read back by: Priscilla Boyce RN on [address] [place] [address],      12/09/2023 14:53 vcb      ENTEROCOCCUS FAECALIS    Rapid Organism ID by PCR (from Blood culture) [[id number]]  (Abnormal) Collected: 12/08/23 1208    Order Status: Completed Updated: 12/10/23 1853     Enterococcus faecalis Not Detected     Enterococcus faecium Not Detected     Listeria monocytogenes Not Detected     Staphylococcus spp. Not Detected     Staphylococcus aureus Not Detected     Staphylococcus epidermidis Not Detected     Staphylococcus lugdunensis Not Detected     Streptococcus species Not Detected     Streptococcus agalactiae Not Detected     Streptococcus pneumoniae Not Detected     Streptococcus pyogenes Not Detected     Acinetobacter calcoaceticus/baumannii complex Not Detected     Bacteroides fragilis Not Detected     Enterobacterales Not Detected     Enterobacter cloacae complex Not Detected     Escherichia coli Not Detected     Klebsiella aerogenes Not Detected     Klebsiella oxytoca Not Detected     Klebsiella pneumoniae group Not Detected     Proteus Not Detected     Salmonella sp Not Detected     Serratia marcescens Not Detected     Haemophilus influenzae Not Detected     Neisseria meningtidis Not Detected     Pseudomonas aeruginosa Not Detected     Stenotrophomonas maltophilia Not Detected     Candida albicans Not Detected     Candida auris Not Detected     Juli glabrata Detected     Juli krusei Not Detected     Candida parapsilosis Not Detected     Candida tropicalis Not Detected     Cryptococcus neoformans/gattii Not Detected     CTX-M (ESBL ) Test not applicable     IMP (Carbapenem resistant) Test not applicable      KPC resistance gene (Carbapenem resistant) Test not applicable     mcr-1  Test not applicable     mec A/C  Test not applicable     mec A/C and MREJ (MRSA) gene Test not applicable     NDM (Carbapenem resistant) Test not applicable     OXA-48-like (Carbapenem resistant) Test not applicable     van A/B (VRE gene) Test not applicable     VIM (Carbapenem resistant) Test not applicable    Blood culture [[id number]] Collected: 12/08/23 1208    Order Status: Completed Specimen: Blood Updated: 12/10/23 1729     Blood Culture, Routine Gram stain aer bottle: budding yeast      Results called to and read back by: Mary Braun LPN on [id number],      12/10/2023 17:29    Blood culture [[id number]]  (Abnormal) Collected: 12/06/23 1521    Order Status: Completed Specimen: Blood from Peripheral, Right Arm Updated: 12/10/23 0745     Blood Culture, Routine Gram stain aer bottle: Gram negative rods      Positive results previously called 12/09/2023  00:48 KS3      PROTEUS MIRABILIS ESBL  For susceptibility see order #[id number]  Known ESBL patient      Narrative:      Collection has been rescheduled by [facility] at 12/06/2023 13:17 Reason:   Duplicate order waiting for dr lott to cancel. Spoke to rob GARCIA.  Collection has been rescheduled by [facility] at 12/06/2023 13:17 Reason:   Duplicate order waiting for dr lott to cancel. Spoke to rob GARCIA.    Rapid Organism ID by PCR (from Blood culture) [[id number]]  (Abnormal) Collected: 12/08/23 1208    Order Status: Completed Updated: 12/09/23 1551     Enterococcus faecalis Detected     Enterococcus faecium Not Detected     Listeria monocytogenes Not Detected     Staphylococcus spp. Not Detected     Staphylococcus aureus Not Detected     Staphylococcus epidermidis Not Detected     Staphylococcus lugdunensis Not Detected     Streptococcus species Not Detected     Streptococcus agalactiae Not Detected     Streptococcus pneumoniae Not Detected     Streptococcus pyogenes Not Detected     Acinetobacter  calcoaceticus/baumannii complex Not Detected     Bacteroides fragilis Not Detected     Enterobacterales Not Detected     Enterobacter cloacae complex Not Detected     Escherichia coli Not Detected     Klebsiella aerogenes Not Detected     Klebsiella oxytoca Not Detected     Klebsiella pneumoniae group Not Detected     Proteus Not Detected     Salmonella sp Not Detected     Serratia marcescens Not Detected     Haemophilus influenzae Not Detected     Neisseria meningtidis Not Detected     Pseudomonas aeruginosa Not Detected     Stenotrophomonas maltophilia Not Detected     Candida albicans Not Detected     Candida auris Not Detected     Candida glabrata Not Detected     Candida krusei Not Detected     Candida parapsilosis Not Detected     Candida tropicalis Not Detected     Cryptococcus neoformans/gattii Not Detected     CTX-M (ESBL ) Test not applicable     IMP (Carbapenem resistant) Test not applicable     KPC resistance gene (Carbapenem resistant) Test not applicable     mcr-1  Test not applicable     mec A/C  Test not applicable     mec A/C and MREJ (MRSA) gene Test not applicable     NDM (Carbapenem resistant) Test not applicable     OXA-48-like (Carbapenem resistant) Test not applicable     van A/B (VRE gene) Not Detected     VIM (Carbapenem resistant) Test not applicable    Urine culture [[id number]]  (Abnormal)  (Susceptibility) Collected: 12/05/23 2003    Order Status: Completed Specimen: Urine Updated: 12/08/23 0757     Urine Culture, Routine PROTEUS MIRABILIS ESBL  >100,000 cfu/ml  Known ESBL patient      Narrative:      Specimen Source->Urine    Blood culture [[id number]]  (Abnormal) Collected: 12/06/23 1522    Order Status: Completed Specimen: Blood from Antecubital, Right Hand Updated: 12/08/23 0738     Blood Culture, Routine Gram stain aer bottle: Gram negative rods      Positive results previously called 12/07/2023  18:19 KS3      PROTEUS MIRABILIS ESBL  Known ESBL patient  For susceptibility  see order #[id number]      Narrative:      Collection has been rescheduled by [facility] at 12/06/2023 13:17 Reason:   Duplicate order waiting for dr lott to cancel. Spoke to rob GARCIA.  Collection has been rescheduled by [facility] at 12/06/2023 13:17 Reason:   Duplicate order waiting for dr lott to cancel. Spoke to rob GARCIA.    Blood culture x two cultures. Draw prior to antibiotics. [[id number]]  (Abnormal) Collected: 12/05/23 2017    Order Status: Completed Specimen: Blood Updated: 12/08/23 0736     Blood Culture, Routine Gram stain lisa bottle: Gram negative rods      Gram stain aer bottle: Gram negative rods      Results called to and read back by:Rob Dugan RN  12/06/2023      10:39 JBM      Positive results previously called      PROTEUS MIRABILIS ESBL  Known ESBL patient  For susceptibility see order #[id number]      Narrative:      Aerobic and anaerobic  Collection has been rescheduled by [facility] at 12/05/2023 19:29 Reason:   Meline,ER tech said to come back in a minute. Patient needs to be   cleaned.  Collection has been rescheduled by [facility] at 12/05/2023 20:20 Reason:   Done  Collection has been rescheduled by [facility] at 12/05/2023 19:29 Reason:   Meline,ER tech said to come back in a minute. Patient needs to be   cleaned.  Collection has been rescheduled by [facility] at 12/05/2023 20:20 Reason:   Done    Blood culture x two cultures. Draw prior to antibiotics. [[id number]]  (Abnormal)  (Susceptibility) Collected: 12/05/23 2007    Order Status: Completed Specimen: Blood Updated: 12/08/23 0735     Blood Culture, Routine Gram stain lisa bottle: Gram negative rods      Gram stain aer bottle: Gram negative rods      Results called to and read back by:Rob Dugan RN  12/06/2023      10:39 JBM      Positive results previously called      PROTEUS MIRABILIS ESBL  Known ESBL patient      Narrative:      Aerobic and anaerobic  Collection has been rescheduled by [facility] at 12/05/2023 19:29 Reason:   Meline,ER tech said to come back  in a minute. Patient needs to be   cleaned.  Collection has been rescheduled by [email or web address] at 12/05/2023 20:20 Reason:   Done  Collection has been rescheduled by [email or web address] at 12/05/2023 19:29 Reason:   Meline,ER tech said to come back in a minute. Patient needs to be   cleaned.  Collection has been rescheduled by [email or web address] at 12/05/2023 20:20 Reason:   Done    MRSA Screen by PCR [[id number]] Collected: 12/06/23 1645    Order Status: Completed Specimen: Nasopharyngeal Swab from Nasal Updated: 12/06/23 2239     MRSA SCREEN BY PCR Negative    Respiratory Infection Panel (PCR), Nasopharyngeal [[id number]] Collected: 12/06/23 1645    Order Status: Completed Specimen: Nasopharyngeal Swab Updated: 12/06/23 1905     Respiratory Infection Panel Source NP swab     Adenovirus Not Detected     Coronavirus 229E, Common Cold Virus Not Detected     Coronavirus HKU1, Common Cold Virus Not Detected     Coronavirus NL63, Common Cold Virus Not Detected     Coronavirus OC43, Common Cold Virus Not Detected     Comment: Coronavirus strains 229E, HKU1, NL63, and OC43 can cause the common   cold   and are not associated with the respiratory disease outbreak caused   by  the COVID-19 (SARS-CoV-2 novel Coronavirus) strain.           SARS-CoV2 (COVID-19) Qualitative PCR Not Detected     Human Metapneumovirus Not Detected     Human Rhinovirus/Enterovirus Not Detected     Influenza A (subtypes H1, H1-2009,H3) Not Detected     Influenza B Not Detected     Parainfluenza Virus 1 Not Detected     Parainfluenza Virus 2 Not Detected     Parainfluenza Virus 3 Not Detected     Parainfluenza Virus 4 Not Detected     Respiratory Syncytial Virus Not Detected     Bordetella Parapertussis ([id number]) Not Detected     Bordetella pertussis (ptxP) Not Detected     Chlamydia pneumoniae Not Detected     Mycoplasma pneumoniae Not Detected     Comment: Respiratory Infection Panel testing performed by Multiplex PCR.       Narrative:      Respiratory Infection Panel source->NP Swab     Culture, Respiratory with Gram Stain [[id number]]     Order Status: Sent Specimen: Sputum, Induced     Rapid Organism ID by PCR (from Blood culture) [[id number]]  (Abnormal) Collected: 12/05/23 2007    Order Status: Completed Updated: 12/06/23 1151     Enterococcus faecalis Not Detected     Enterococcus faecium Not Detected     Listeria monocytogenes Not Detected     Staphylococcus spp. Not Detected     Staphylococcus aureus Not Detected     Staphylococcus epidermidis Not Detected     Staphylococcus lugdunensis Not Detected     Streptococcus species Not Detected     Streptococcus agalactiae Not Detected     Streptococcus pneumoniae Not Detected     Streptococcus pyogenes Not Detected     Acinetobacter calcoaceticus/baumannii complex Not Detected     Bacteroides fragilis Not Detected     Enterobacterales See species for ID     Comment: critical result(s) called and verbal readback obtained from VERONICA AndersonED by CD3 12/06/2023 11:51          Enterobacter cloacae complex Not Detected     Escherichia coli Not Detected     Klebsiella aerogenes Not Detected     Klebsiella oxytoca Not Detected     Klebsiella pneumoniae group Not Detected     Proteus Detected     Comment: critical result(s) called and verbal readback obtained from VERONICA AndersonED by CD3 12/06/2023 11:51          Salmonella sp Not Detected     Serratia marcescens Not Detected     Haemophilus influenzae Not Detected     Neisseria meningtidis Not Detected     Pseudomonas aeruginosa Not Detected     Stenotrophomonas maltophilia Not Detected     Candida albicans Not Detected     Candida auris Not Detected     Candida glabrata Not Detected     Candida krusei Not Detected     Candida parapsilosis Not Detected     Candida tropicalis Not Detected     Cryptococcus neoformans/gattii Not Detected     CTX-M (ESBL ) Detected     Comment: critical result(s) called and verbal readback obtained from MARIBEL Anderson by CD3 12/06/2023 11:51          IMP  (Carbapenem resistant) Not Detected     KPC resistance gene (Carbapenem resistant) Not Detected     mcr-1  Test not applicable     mec A/C  Test not applicable     mec A/C and MREJ (MRSA) gene Test not applicable     NDM (Carbapenem resistant) Not Detected     OXA-48-like (Carbapenem resistant) Not Detected     van A/B (VRE gene) Test not applicable     VIM (Carbapenem resistant) Not Detected    Narrative:      Aerobic and anaerobic     critical result(s) called and verbal readback obtained from Meghann lA RN-ED by CHANDA 12/06/2023 11:51              CURRENT/PREVIOUS VISIT EKG  Results for orders placed or performed during the hospital encounter of 12/05/23   EKG 12-lead    Collection Time: 12/05/23  7:35 PM    Narrative    Test Reason : R00.0,    Vent. Rate : 112 BPM     Atrial Rate : 112 BPM     P-R Int : 132 ms          QRS Dur : 066 ms      QT Int : 340 ms       P-R-T Axes : 066 051 066 degrees     QTc Int : 464 ms    Sinus tachycardia  Otherwise normal ECG  When compared with ECG of 24-OCT-2023 14:57,  No significant change was found    Referred By: [email or web address]   SELF           Confirmed By:          Significant Imaging: I have reviewed all relevant and available imaging results/findings within the past 24 hours.    US Retroperitoneal Complete 12/06/23 0123   Limited evaluation. Probable mild right hydronephrosis.     X-Ray Chest AP Portable 12/05/23 1939   IMPRESSION: No acute pulmonary process    CT Chest Without Contrast 12/06/23 1817   Patchy nodular opacities involving the right upper lobe with surrounding groundglass attenuation, concerning for infectious/inflammatory process including aspiration pneumonitis.   Airspace consolidation with volume loss right middle lobe and right lower lobe, likely representing a combination of pneumonia and atelectasis.    CT Renal Stone Study Abd Pelvis WO 12/06/23 1816   Negative for obstructive uropathy.   Probable 1 mm punctate calculus within the distal left ureter.    Cholelithiasis.   Additional incidental findings as described above    CT Chest Abdomen Pelvis With IV Contrast (XPD) Routine Oral Contrast 12/11/23 1142  Mild bilateral hydronephrosis and hydroureter to the level of the midpelvis without defined etiology. The urinary bladder contains a Mathur catheter and is nondistended.   Persistence of patchy pulmonary airspace opacities, greater on the right. Correlate with evidence of multifocal pneumonia.   Ill-defined fluid collection within the gallbladder fossa similar to prior studies.   Mildly prominent right hilar lymph node possibly reactive. CT Follow-up recommended.   Multiple additional observations as above.      Roberta Samaniego NP  Date of Service: 12/11/2023  2:04 PM

## 2023-12-12 ENCOUNTER — ANESTHESIA (OUTPATIENT)
Dept: CARDIOLOGY | Facility: HOSPITAL | Age: 63
DRG: 698 | End: 2023-12-12
Payer: MEDICARE

## 2023-12-12 ENCOUNTER — CLINICAL SUPPORT (OUTPATIENT)
Dept: CARDIOLOGY | Facility: HOSPITAL | Age: 63
DRG: 698 | End: 2023-12-12
Attending: EMERGENCY MEDICINE
Payer: MEDICARE

## 2023-12-12 ENCOUNTER — ANESTHESIA EVENT (OUTPATIENT)
Dept: CARDIOLOGY | Facility: HOSPITAL | Age: 63
DRG: 698 | End: 2023-12-12
Payer: MEDICARE

## 2023-12-12 VITALS — WEIGHT: 136 LBS | BODY MASS INDEX: 27.42 KG/M2 | HEIGHT: 59 IN

## 2023-12-12 VITALS
RESPIRATION RATE: 29 BRPM | SYSTOLIC BLOOD PRESSURE: 152 MMHG | OXYGEN SATURATION: 100 % | DIASTOLIC BLOOD PRESSURE: 73 MMHG | HEART RATE: 85 BPM

## 2023-12-12 LAB
ABO + RH BLD: NORMAL
ALBUMIN SERPL BCP-MCNC: 2.8 G/DL (ref 3.5–5.2)
ALP SERPL-CCNC: 201 U/L (ref 55–135)
ALT SERPL W/O P-5'-P-CCNC: 85 U/L (ref 10–44)
ANION GAP SERPL CALC-SCNC: 7 MMOL/L (ref 8–16)
ANISOCYTOSIS BLD QL SMEAR: SLIGHT
AST SERPL-CCNC: 46 U/L (ref 10–40)
BASOPHILS # BLD AUTO: 0.11 K/UL (ref 0–0.2)
BASOPHILS NFR BLD: 0.5 % (ref 0–1.9)
BILIRUB DIRECT SERPL-MCNC: 0.1 MG/DL (ref 0.1–0.3)
BILIRUB SERPL-MCNC: 0.6 MG/DL (ref 0.1–1)
BLD GP AB SCN CELLS X3 SERPL QL: NORMAL
BSA FOR ECHO PROCEDURE: 1.6 M2
BUN SERPL-MCNC: 15 MG/DL (ref 8–23)
CALCIUM SERPL-MCNC: 8.6 MG/DL (ref 8.7–10.5)
CHLORIDE SERPL-SCNC: 106 MMOL/L (ref 95–110)
CO2 SERPL-SCNC: 23 MMOL/L (ref 23–29)
CREAT SERPL-MCNC: 0.5 MG/DL (ref 0.5–1.4)
DIFFERENTIAL METHOD BLD: ABNORMAL
EOSINOPHIL # BLD AUTO: 0.4 K/UL (ref 0–0.5)
EOSINOPHIL NFR BLD: 1.6 % (ref 0–8)
ERYTHROCYTE [DISTWIDTH] IN BLOOD BY AUTOMATED COUNT: 22.5 % (ref 11.5–14.5)
EST. GFR  (NO RACE VARIABLE): >60 ML/MIN/1.73 M^2
GLUCOSE SERPL-MCNC: 120 MG/DL (ref 70–110)
GLUCOSE SERPL-MCNC: 120 MG/DL (ref 70–110)
GLUCOSE SERPL-MCNC: 149 MG/DL (ref 70–110)
GLUCOSE SERPL-MCNC: 53 MG/DL (ref 70–110)
GLUCOSE SERPL-MCNC: 93 MG/DL (ref 70–110)
GLUCOSE SERPL-MCNC: 98 MG/DL (ref 70–110)
GLUCOSE SERPL-MCNC: 99 MG/DL (ref 70–110)
HCT VFR BLD AUTO: 22.1 % (ref 37–48.5)
HGB BLD-MCNC: 7.4 G/DL (ref 12–16)
HYPOCHROMIA BLD QL SMEAR: ABNORMAL
IMM GRANULOCYTES # BLD AUTO: 0.98 K/UL (ref 0–0.04)
IMM GRANULOCYTES NFR BLD AUTO: 4.5 % (ref 0–0.5)
LYMPHOCYTES # BLD AUTO: 2.4 K/UL (ref 1–4.8)
LYMPHOCYTES NFR BLD: 11.1 % (ref 18–48)
MAGNESIUM SERPL-MCNC: 1.9 MG/DL (ref 1.6–2.6)
MCH RBC QN AUTO: 25.3 PG (ref 27–31)
MCHC RBC AUTO-ENTMCNC: 33.5 G/DL (ref 32–36)
MCV RBC AUTO: 75 FL (ref 82–98)
MONOCYTES # BLD AUTO: 1.5 K/UL (ref 0.3–1)
MONOCYTES NFR BLD: 6.9 % (ref 4–15)
NEUTROPHILS # BLD AUTO: 16.3 K/UL (ref 1.8–7.7)
NEUTROPHILS NFR BLD: 75.4 % (ref 38–73)
NRBC BLD-RTO: 0 /100 WBC
PLATELET # BLD AUTO: 766 K/UL (ref 150–450)
PLATELET BLD QL SMEAR: ABNORMAL
PMV BLD AUTO: 10.6 FL (ref 9.2–12.9)
POIKILOCYTOSIS BLD QL SMEAR: SLIGHT
POLYCHROMASIA BLD QL SMEAR: ABNORMAL
POTASSIUM SERPL-SCNC: 3.9 MMOL/L (ref 3.5–5.1)
PROT SERPL-MCNC: 6.8 G/DL (ref 6–8.4)
RBC # BLD AUTO: 2.93 M/UL (ref 4–5.4)
SODIUM SERPL-SCNC: 136 MMOL/L (ref 136–145)
SPECIMEN OUTDATE: NORMAL
TARGETS BLD QL SMEAR: ABNORMAL
WBC # BLD AUTO: 21.62 K/UL (ref 3.9–12.7)

## 2023-12-12 PROCEDURE — 94640 AIRWAY INHALATION TREATMENT: CPT

## 2023-12-12 PROCEDURE — 87040 BLOOD CULTURE FOR BACTERIA: CPT | Mod: 59 | Performed by: NURSE PRACTITIONER

## 2023-12-12 PROCEDURE — 93320 DOPPLER ECHO COMPLETE: CPT | Mod: 26,,, | Performed by: INTERNAL MEDICINE

## 2023-12-12 PROCEDURE — 99222 1ST HOSP IP/OBS MODERATE 55: CPT | Mod: ,,, | Performed by: INTERNAL MEDICINE

## 2023-12-12 PROCEDURE — 25000242 PHARM REV CODE 250 ALT 637 W/ HCPCS: Performed by: STUDENT IN AN ORGANIZED HEALTH CARE EDUCATION/TRAINING PROGRAM

## 2023-12-12 PROCEDURE — 93325 DOPPLER ECHO COLOR FLOW MAPG: CPT

## 2023-12-12 PROCEDURE — D9220A PRA ANESTHESIA: Mod: CRNA,,, | Performed by: NURSE ANESTHETIST, CERTIFIED REGISTERED

## 2023-12-12 PROCEDURE — 25000003 PHARM REV CODE 250: Performed by: STUDENT IN AN ORGANIZED HEALTH CARE EDUCATION/TRAINING PROGRAM

## 2023-12-12 PROCEDURE — 94761 N-INVAS EAR/PLS OXIMETRY MLT: CPT

## 2023-12-12 PROCEDURE — 93312 ECHO TRANSESOPHAGEAL: CPT | Mod: 26,,, | Performed by: INTERNAL MEDICINE

## 2023-12-12 PROCEDURE — 93325 DOPPLER ECHO COLOR FLOW MAPG: CPT | Mod: 26,,, | Performed by: INTERNAL MEDICINE

## 2023-12-12 PROCEDURE — 85025 COMPLETE CBC W/AUTO DIFF WBC: CPT | Performed by: STUDENT IN AN ORGANIZED HEALTH CARE EDUCATION/TRAINING PROGRAM

## 2023-12-12 PROCEDURE — 99900031 HC PATIENT EDUCATION (STAT)

## 2023-12-12 PROCEDURE — 80076 HEPATIC FUNCTION PANEL: CPT | Performed by: STUDENT IN AN ORGANIZED HEALTH CARE EDUCATION/TRAINING PROGRAM

## 2023-12-12 PROCEDURE — 25000003 PHARM REV CODE 250: Performed by: NURSE ANESTHETIST, CERTIFIED REGISTERED

## 2023-12-12 PROCEDURE — 99900035 HC TECH TIME PER 15 MIN (STAT)

## 2023-12-12 PROCEDURE — D9220A PRA ANESTHESIA: Mod: ANES,,, | Performed by: STUDENT IN AN ORGANIZED HEALTH CARE EDUCATION/TRAINING PROGRAM

## 2023-12-12 PROCEDURE — 93320 TRANSESOPHAGEAL ECHO (TEE) (CUPID ONLY): ICD-10-PCS | Mod: 26,,, | Performed by: INTERNAL MEDICINE

## 2023-12-12 PROCEDURE — 25000003 PHARM REV CODE 250: Performed by: NURSE PRACTITIONER

## 2023-12-12 PROCEDURE — D9220A PRA ANESTHESIA: ICD-10-PCS | Mod: ANES,,, | Performed by: STUDENT IN AN ORGANIZED HEALTH CARE EDUCATION/TRAINING PROGRAM

## 2023-12-12 PROCEDURE — 93325 TRANSESOPHAGEAL ECHO (TEE) (CUPID ONLY): ICD-10-PCS | Mod: 26,,, | Performed by: INTERNAL MEDICINE

## 2023-12-12 PROCEDURE — 87040 BLOOD CULTURE FOR BACTERIA: CPT | Performed by: INTERNAL MEDICINE

## 2023-12-12 PROCEDURE — 83735 ASSAY OF MAGNESIUM: CPT | Performed by: STUDENT IN AN ORGANIZED HEALTH CARE EDUCATION/TRAINING PROGRAM

## 2023-12-12 PROCEDURE — 63600175 PHARM REV CODE 636 W HCPCS: Performed by: INTERNAL MEDICINE

## 2023-12-12 PROCEDURE — B24BZZ4 ULTRASONOGRAPHY OF HEART WITH AORTA, TRANSESOPHAGEAL: ICD-10-PCS | Performed by: INTERNAL MEDICINE

## 2023-12-12 PROCEDURE — 86901 BLOOD TYPING SEROLOGIC RH(D): CPT | Performed by: STUDENT IN AN ORGANIZED HEALTH CARE EDUCATION/TRAINING PROGRAM

## 2023-12-12 PROCEDURE — 63600175 PHARM REV CODE 636 W HCPCS: Performed by: NURSE PRACTITIONER

## 2023-12-12 PROCEDURE — 36415 COLL VENOUS BLD VENIPUNCTURE: CPT | Performed by: STUDENT IN AN ORGANIZED HEALTH CARE EDUCATION/TRAINING PROGRAM

## 2023-12-12 PROCEDURE — 37000009 HC ANESTHESIA EA ADD 15 MINS: Performed by: INTERNAL MEDICINE

## 2023-12-12 PROCEDURE — 63600175 PHARM REV CODE 636 W HCPCS: Performed by: STUDENT IN AN ORGANIZED HEALTH CARE EDUCATION/TRAINING PROGRAM

## 2023-12-12 PROCEDURE — 93320 DOPPLER ECHO COMPLETE: CPT

## 2023-12-12 PROCEDURE — D9220A PRA ANESTHESIA: ICD-10-PCS | Mod: CRNA,,, | Performed by: NURSE ANESTHETIST, CERTIFIED REGISTERED

## 2023-12-12 PROCEDURE — 99233 SBSQ HOSP IP/OBS HIGH 50: CPT | Mod: FS,,, | Performed by: NURSE PRACTITIONER

## 2023-12-12 PROCEDURE — 37000008 HC ANESTHESIA 1ST 15 MINUTES: Performed by: INTERNAL MEDICINE

## 2023-12-12 PROCEDURE — 12000002 HC ACUTE/MED SURGE SEMI-PRIVATE ROOM

## 2023-12-12 PROCEDURE — 25000003 PHARM REV CODE 250: Performed by: INTERNAL MEDICINE

## 2023-12-12 PROCEDURE — 80048 BASIC METABOLIC PNL TOTAL CA: CPT | Performed by: STUDENT IN AN ORGANIZED HEALTH CARE EDUCATION/TRAINING PROGRAM

## 2023-12-12 PROCEDURE — 86920 COMPATIBILITY TEST SPIN: CPT | Performed by: STUDENT IN AN ORGANIZED HEALTH CARE EDUCATION/TRAINING PROGRAM

## 2023-12-12 PROCEDURE — 93312 TRANSESOPHAGEAL ECHO (TEE) (CUPID ONLY): ICD-10-PCS | Mod: 26,,, | Performed by: INTERNAL MEDICINE

## 2023-12-12 RX ORDER — HYDROCODONE BITARTRATE AND ACETAMINOPHEN 500; 5 MG/1; MG/1
TABLET ORAL
Status: DISCONTINUED | OUTPATIENT
Start: 2023-12-12 | End: 2023-12-19 | Stop reason: HOSPADM

## 2023-12-12 RX ORDER — PROPOFOL 10 MG/ML
VIAL (ML) INTRAVENOUS
Status: DISCONTINUED | OUTPATIENT
Start: 2023-12-12 | End: 2023-12-12

## 2023-12-12 RX ORDER — HYDRALAZINE HYDROCHLORIDE 20 MG/ML
10 INJECTION INTRAMUSCULAR; INTRAVENOUS EVERY 6 HOURS PRN
Status: DISCONTINUED | OUTPATIENT
Start: 2023-12-12 | End: 2023-12-19 | Stop reason: HOSPADM

## 2023-12-12 RX ADMIN — IPRATROPIUM BROMIDE 0.5 MG: 0.5 SOLUTION RESPIRATORY (INHALATION) at 08:12

## 2023-12-12 RX ADMIN — Medication 10 MG: at 01:12

## 2023-12-12 RX ADMIN — LEVETIRACETAM 500 MG: 100 SOLUTION ORAL at 09:12

## 2023-12-12 RX ADMIN — DEXTROSE MONOHYDRATE 12.5 G: 25 INJECTION, SOLUTION INTRAVENOUS at 08:12

## 2023-12-12 RX ADMIN — SODIUM CHLORIDE: 0.9 INJECTION, SOLUTION INTRAVENOUS at 01:12

## 2023-12-12 RX ADMIN — MEROPENEM 1 G: 1 INJECTION, POWDER, FOR SOLUTION INTRAVENOUS at 09:12

## 2023-12-12 RX ADMIN — METOPROLOL TARTRATE 25 MG: 25 TABLET, FILM COATED ORAL at 09:12

## 2023-12-12 RX ADMIN — MEROPENEM 1 G: 1 INJECTION, POWDER, FOR SOLUTION INTRAVENOUS at 01:12

## 2023-12-12 RX ADMIN — APIXABAN 2.5 MG: 2.5 TABLET, FILM COATED ORAL at 09:12

## 2023-12-12 RX ADMIN — HYDRALAZINE HYDROCHLORIDE 10 MG: 20 INJECTION INTRAMUSCULAR; INTRAVENOUS at 08:12

## 2023-12-12 RX ADMIN — MEROPENEM 1 G: 1 INJECTION, POWDER, FOR SOLUTION INTRAVENOUS at 06:12

## 2023-12-12 RX ADMIN — VANCOMYCIN HYDROCHLORIDE 1000 MG: 1 INJECTION, POWDER, LYOPHILIZED, FOR SOLUTION INTRAVENOUS at 05:12

## 2023-12-12 RX ADMIN — ATORVASTATIN CALCIUM 80 MG: 40 TABLET, FILM COATED ORAL at 09:12

## 2023-12-12 RX ADMIN — MICAFUNGIN SODIUM 100 MG: 100 INJECTION, POWDER, LYOPHILIZED, FOR SOLUTION INTRAVENOUS at 11:12

## 2023-12-12 RX ADMIN — IPRATROPIUM BROMIDE 0.5 MG: 0.5 SOLUTION RESPIRATORY (INHALATION) at 12:12

## 2023-12-12 RX ADMIN — Medication 30 MG: at 01:12

## 2023-12-12 NOTE — RESPIRATORY THERAPY
Patient receiving aerosol tx via 0.5mg atrovent now and q6hr.  Hr 83 and 02 saturation 98% on room air.

## 2023-12-12 NOTE — PROGRESS NOTES
[facility]  Department of Infectious Disease  Progress Note        PATIENT NAME: Steff Johnson  MRN: [id number]  TODAY'S DATE: 12/12/2023  ADMIT DATE: 12/5/2023  LENGTH OF STAY: 7 DAYS    PRINCIPLE PROBLEM: Severe sepsis    REASON FOR CONSULT:  Gram negative sepsis     INTERVAL HISTORY     12/12@1114 (Danette):  Patient seen and examined alone in her room.  Condition is unchanged.  She is supposed to have a GT today and a blood transfusion.  IR declined any kind of intervention with fluid collection around gallbladder because it has been stable since 2016 and they did not feel like it was an infectious fluid collection.  T-max 99° in the last 24 hours.  WBC 21.62, neutrophils 75.4%, platelets 766, no bands, H&H 7.4/22.1, BUN/CR 15/0.5, CRP and procalcitonin trending down with platelets trending up.  Hepatic function panel is pending.  12/12 and 12/11 blood cultures are pending.  12/10 blood cultures x1 set no growth to date.  12/8 blood cultures with 1 bottle of 1 set with Candida glabrata and 1 bottle of 1 set with Enterococcus faecalis.  12/6 blood cultures with 4 4 bottles positive for Proteus mirabilis ESBL.  12/11 urine culture is pending with no growth to date.  She continues on vancomycin, micafungin, and meropenem.      12/11@1234 (Danette):  Patient seen and examined alone in her room.  She was lying in bed and opens eyes to voice.  She makes eye contact and if you ask her if she was doing okay she shakes her head yes and then stops responding and goes back to sleep.  At this point she refuses to follow any commands.  She had a PICC line paced to right upper extremity.  T-max 99.2° in last 24 hours.  T-max since admission 100.4.  WBC slightly elevated at 24.74, H&H decreased to date 6.8/20.5, platelets increased at 582, neutrophils 73.6%, no bands, BUN/CR improved at 21/0.6 with creatinine clearance 79.7.  CRP decreased from 293 to 55, procalcitonin decreased from 133 to 4.  Last vanc level  18 0.2, trough 26, dose of vancomycin adjusted.  Echocardiogram done today with technically difficult study.  Repeat UA with hematuria, pyuria and yeast though urine is quite clear with no sediment and urinary catheter.  12-5 and 12/6 blood cultures with Proteus mirabilis ESBL, 12/8 blood culture with Enterococcus faecalis and Candida glabrata.  12/10 and 12/11 blood culture pending.  CT abdomen and pelvis with contrast with hydronephrosis and hydroureter with no obstruction, gallbladder fluid collection with no biliary dilatation.     12/10/2023 Dr. Calvo covering for the weekend.  Patient looks much more comfortable today, compared to yesterday.  She is nonverbal as usual.  T-max 99.8°.  WBC worsened 20--24.8.  Blood cultures from 12/ 8 grew Enterococcus vancomycin was started yesterday.  Tonight there are morbid news.  C glabrata from blood cultures.    12/09/2023.  Dr. Calvo covering for the weekend.  Afebrile.  T-max 98.7° but she does feel warm on physical exam.  I hope she is not about to spike a fever.    Nice drop in WBC 40.9--39--24.4--22.8--20.79.  Nice drop in procalcitonin 133--17.9   Nice drop in CRP:  293--143  Repeat blood cultures of 12/08/2023 are negative to date.  Hemoglobin 9.6--7.9--7.9--7.1--7.2.  Platelets are still elevated 577--492--474--421--495.  Catheter was exchanged on the 6th.  Urine is almost clear.    12/7@1246 (Denette):  Patient seen and examined in her room.  She was lying in bed and opens eyes to voice and initially makes eye contact then turns away.  She does not follow commands or move any extremities spontaneously.  T-max 100.4° at 7:00 p.m. yesterday.  WBC decreased to 24.48 from 30/9 0.98, yesterday 37% bands, none today, + left shift, BUN/CR improving at 40/0.9.  , lactic acid decreased to 1.2, procalcitonin 133.  MRSA screen was negative, vanc level 12.0.  Respiratory virus panel negative, 12/6 blood cultures negative, sputum culture not obtain, 12/5 blood cultures  with 4 4 bottles positive for Proteus species.  Urine culture with Gram-negative sheryl non lactose .      Antibiotics (From admission, onward)      Start     Stop Route Frequency Ordered    12/11/23 1800  vancomycin in dextrose 5 % 1 gram/250 mL IVPB 1,000 mg         -- IV Every 24 hours (non-standard times) 12/11/23 1743    12/09/23 1607  vancomycin - pharmacy to dose  (vancomycin IVPB (PEDS and ADULTS))        See [facility] for full Linked Orders Report.    -- IV pharmacy to manage frequency 12/09/23 1507    12/08/23 2100  meropenem 1 g in sodium chloride 0.9 % 100 mL IVPB (ready to mix system)        Note to Pharmacy: Original order: meropenem 1 g Q 8 hours    -- IV Every 8 hours (non-standard times) 12/08/23 1627    12/06/23 0900  mupirocin 2 % ointment         12/11/23 0859 Nasl 2 times daily 12/05/23 2308          ASSESSMENT and PLAN     1. Sepsis with polymicrobial bacteremia and fungemia - Proteus mirabilis ESBL, Enterococcus faecalis and Candida glabrata  -12/5 blood cultures x2 sets with 4 of 4 bottles positive for Proteus mirabilis ESBL  -12/6 blood cultures x2 sets 1/4 bottles positive for Proteus mirabilis ESBL  -12/8 blood cultures x2 sets with 1 bottle of 1 set positive for Enterococcus faecalis and 1 bottle of 1 set positive with Candida glabrata   -12/10 blood culture x1 set ngtd, pending final  -12/11 blood cultures x3 sets ngtd, pending final  -12/12 blood cultures x3 pending  -37% bands, WBC 40.96-->39.98-->24.48-->22.83-->20.79-->24.88-->24.74  -Procalcitonin 133.671-->17.936-->4.179  --->143-->55  -serum lactate 3.8-->3.0-->1.2  -Urine source? del rosario changed, decubitus appears clean  12/05/2023 urine culture Proteus mirabilis ESBL; 12/11 repeat urine cx pending UA with yeast, pyuria and hematuria  -12/11 Urine culture ngtd, pending final  -Urine culture on prior visit, 10/24/2023 had Enterococcus faecalis.   -echocardiogram negative for vegetation    2. Acute respiratory failure  with hypoxia with possible right-sided aspiration pneumonia and right hilar lymphadenopathy                -on 1-2 L O2   -CT chest with patch really nodular opacities of the right upper lobe possibly aspiration pneumonitis and right middle and lower lobe with pneumonia and/or atelectasis  -Improving, on room air   -CT chest with right-greater-than-left bilateral opacities some improvement in right lower lobe and slight progression and left lower lobe.  -CT abdomen and pelvis with contrast from today shows ill-defined fluid collection within gallbladder fossa with no biliary dilatation, mildly enlarged right hilar lymph node, progression and left lower lobe, improvement in right lower lobe, unchanged masslike lesion long the anterior margin of the spleen, bilateral hydronephrosis and hydroureter without obstructing etiology with nondistended urinary catheter and diffuse body wall edema.  Possible small hip joint effusions    3. Impaired cognition and mobility s/p CVA living in SNF with chronic indwelling Mathur catheter and decubitus ulcer    4. Acute kidney injury on admission, resolved with CT with bilateral hydronephrosis and hydroureter with no obstruction  -renal ultrasound with probable mild right hydronephrosis no stones; she was seen by Urology on last admission for mild right hydroureteronephrosis possibly due to neurogenic bladder and reflex versus obstruction from poorly compliant bladder   -CT abdomen and pelvis with shotty retroperitoneal lymph nodes, 1 mm calcification in distal left ureter, cholelithiasis  -Repeat CT abdomen and pelvis with contrast with bilateral hydronephrosis and hydroureter with no obstruction     5. Chronic medical problems including diabetes mellitus, hypertension,  bed-bound, and COPD, anemia   -HGB 6.8/20.5; baseline around 8-9 but was 6.8 in October with 1u PRBC  on 10/26        RECOMMENDATIONS:  Continue meropenem  1 g IV every 8 hours PM ESBL  Continue vancomycin with  pharmacy to dose, for Enterococcus species  Continue micafungin for C glabrata candidemia.    Monitor inflammatory markers, WBC and diff - all improving  PICC placed on 12/11  Wound care to all affected areas  Aspiration precautions   GT pending  Pending cystoscopy with stents per Urology  Recommend follow-up CT chest for resolution of right hilar lymphadenopathy    DW Dr Cochran, Hospitalist    Reviewed and ordered labs, micro and diagnostics, prescription drug management    Thank you for this consult. Please send [employer] secure chat with any questions.       SUBJECTIVE    Review of Systems  Unable to obtain due to aphasia    OBJECTIVE   Temp:  [97.7 °F (36.5 °C)-99 °F (37.2 °C)] 97.8 °F (36.6 °C)  Pulse:  [78-96] 96  Resp:  [18-20] 18  SpO2:  [94 %-98 %] 98 %  BP: (136-172)/(61-83) 154/72  Temp:  [97.7 °F (36.5 °C)-99 °F (37.2 °C)]   Temp: 97.8 °F (36.6 °C) (12/12/23 1226)  Pulse: 96 (12/12/23 1226)  Resp: 18 (12/12/23 1226)  BP: (!) 154/72 (12/12/23 1226)  SpO2: 98 % (12/12/23 1226)    Intake/Output Summary (Last 24 hours) at 12/12/2023 1231  Last data filed at 12/11/2023 1701  Gross per 24 hour   Intake --   Output 1900 ml   Net -1900 ml          Physical Exam  General:  Eyes open to voice, does not attend.   Eyes: Eyes with no icterus or injection.  No exudates.  Ears:  Hearing seems grossly intact  Nose: Nares patent  Mouth: Refuses to open mouth than a small bit, M/M moist  Neck: No tenderness to palpation.  Cardiovascular: Regular rate and rhythm, no murmurs, Mild generalized edema  Respiratory:  Clear anteriorly, on RA, no tachypnea or increased work of breathing.   Gastrointestinal:  Soft and obese with active bowel sounds, no distention.  Peg tube clamped. Minimal redness, no drainage at PEG site.  Genitourinary:  Urinary catheter with clear urine.   Musculoskeletal:  No spontaneous movement, heel protectors in place.   Skin: Pale, sacral wound with no surrounding erythema.  Neuro: Poor attention but  initially seemed responsive, does not follow commands, no spontaneous movement.  Psych:  No agitation.  VAD: PICC rt upper extremity  Isolation: Contact for ESBL    WOUNDS    12/6:                              Significant Labs: All pertinent labs within the past 24 hours have been reviewed.    CBC LAST 7 DAYS  Recent Labs   Lab 12/06/23  0526 12/06/23  0526 12/07/23  0508 12/08/23  0544 12/09/23  0547 12/10/23  0626 12/11/23  0629 12/12/23  0855   WBC 39.98*  --  24.48* 22.83* 20.79* 24.88* 24.74* 21.62*   RBC 3.08*  --  3.04* 2.82* 2.88* 2.85* 2.69* 2.93*   HGB 7.9*  --  7.9* 7.1* 7.2* 7.3* 6.8* 7.4*   HCT 23.4*  --  23.0* 21.8* 21.9* 21.9* 20.5* 22.1*   MCV 76*  --  76* 77* 76* 77* 76* 75*   MCH 25.6*  --  26.0* 25.2* 25.0* 25.6* 25.3* 25.3*   MCHC 33.8  --  34.3 32.6 32.9 33.3 33.2 33.5   RDW 21.4*  --  21.5* 22.0* 22.3* 22.9* 22.5* 22.5*   *  --  474* 421 495* 383 582* 766*   MPV 10.7  --  10.3 10.9 10.7 11.5 10.9 10.6   GRAN 49.0   < > 87.7*  21.4* 77.5*  17.7* 72.0 76.0* 73.6*  18.2* 75.4*  16.3*   LYMPH 5.0*   < > 5.3*  1.3 10.2*  2.3 12.0* 14.0*  CANCELED 11.0*  2.7 11.1*  2.4   MONO 5.0   < > 4.6  1.1* 8.2  1.9* 10.0 5.0  CANCELED 7.6  1.9* 6.9  1.5*   BASO  --   --  0.08 0.08  --  CANCELED 0.11 0.11   NRBC 0  --  0 0 0 0 0 0    < > = values in this interval not displayed.         CHEMISTRY LAST 7 DAYS  Recent Labs   Lab 12/05/23 2028 12/05/23 2134 12/05/23  2134 12/06/23  0526 12/07/23  0508 12/08/23  0544 12/09/23  0547 12/10/23  0626 12/11/23  0629 12/12/23  0855   NA  --  135*   < > 135* 139 142 143 140 136 136   K  --  5.2*   < > 4.4 3.9 3.8 4.0 4.3 4.2 3.9   CL  --  94*   < > 99 104 108 109 108 105 106   CO2  --  28   < > 28 25 27 28 26 25 23   ANIONGAP  --  13   < > 8 10 7* 6* 6* 6* 7*   BUN  --  67*   < > 61* 40* 32* 25* 24* 21 15   CREATININE  --  1.7*   < > 1.5* 0.9 0.7 0.6 0.6 0.6 0.5   GLU  --  221*   < > 216* 186* 120* 110 140* 123* 120*   CALCIUM  --  9.6   < > 8.4* 9.2  "9.1 8.9 8.5* 8.3* 8.6*   PH 7.540*  --   --   --   --   --   --   --   --   --    MG  --   --   --  2.2 2.0 1.8 1.8 2.0 2.0 1.9   ALBUMIN  --  3.3*  --   --   --   --   --   --   --   --    PROT  --  7.4  --   --   --   --   --   --   --   --    ALKPHOS  --  98  --   --   --   --   --   --   --   --    ALT  --  30  --   --   --   --   --   --   --   --    AST  --  35  --   --   --   --   --   --   --   --    BILITOT  --  0.5  --   --   --   --   --   --   --   --     < > = values in this interval not displayed.         Estimated Creatinine Clearance: 95.6 mL/min (based on SCr of 0.5 mg/dL).    INFLAMMATORY/PROCAL  LAST 7 DAYS  Recent Labs   Lab 12/05/23 2134 12/06/23  0526 12/09/23  0547 12/11/23  0937   PROCAL 133.671*  --  17.936* 4.179*   CRP  --  293.0* 143.3* 55.1*       No results found for: "ESR"  CRP   Date Value Ref Range Status   12/11/2023 55.1 (H) 0.0 - 8.2 mg/L Final   12/09/2023 143.3 (H) 0.0 - 8.2 mg/L Final   12/06/2023 293.0 (H) 0.0 - 8.2 mg/L Final   10/25/2023 92.6 (H) 0.0 - 8.2 mg/L Final   06/15/2023 0.65 <0.76 mg/dL Final   11/23/2021 3.21 (H) <0.76 mg/dL Final   07/04/2021 0.34 <0.76 mg/dL Final       PRIOR  MICROBIOLOGY:    Susceptibility data from last 90 days.  Collected Specimen Info Organism Amp/Sulbactam Ampicillin Cefazolin Cefepime Ceftriaxone Ciprofloxacin Ertapenem Genatmicin Synergy Screen Gentamicin Levofloxacin Meropenem Nitrofurantoin PIPERACILLIN/TAZO SUSCEPTIBILITY   12/08/23 Blood Candida glabrata                12/08/23 Blood Enterococcus faecalis   S       R        12/06/23 Blood from Antecubital, Right Hand Proteus mirabilis ESBL                12/06/23 Blood from Peripheral, Right Arm Proteus mirabilis ESBL                12/05/23 Blood Proteus mirabilis ESBL                12/05/23 Blood Proteus mirabilis ESBL  S  R  R  R  R  R  S   S  R  S   S   12/05/23 Urine Proteus mirabilis ESBL  S  R  R  R  R  R  S   S  R  S  R  S   10/24/23 Urine from Clean Catch Enterococcus " faecalis   S           S    10/24/23 Blood from Antecubital, Right No growth after 5 days.                10/24/23 Blood from Antecubital, Left No growth after 5 days.                  Collected Specimen Info Organism Tetracycline Tobramycin Trimeth/Sulfa Vancomycin   12/08/23 Blood Candida glabrata       12/08/23 Blood Enterococcus faecalis     S   12/06/23 Blood from Antecubital, Right Hand Proteus mirabilis ESBL       12/06/23 Blood from Peripheral, Right Arm Proteus mirabilis ESBL       12/05/23 Blood Proteus mirabilis ESBL       12/05/23 Blood Proteus mirabilis ESBL  R  S  R    12/05/23 Urine Proteus mirabilis ESBL  R  S  R    10/24/23 Urine from Clean Catch Enterococcus faecalis  S    S   10/24/23 Blood from Antecubital, Right No growth after 5 days.       10/24/23 Blood from Antecubital, Left No growth after 5 days.               LAST 7 DAYS MICROBIOLOGY   Microbiology Results (last 7 days)       Procedure Component Value Units Date/Time    Blood culture [[id number]] Collected: 12/11/23 0628    Order Status: Completed Specimen: Blood Updated: 12/12/23 1032     Blood Culture, Routine No Growth to date      No Growth to date    Blood culture [[id number]] Collected: 12/12/23 0853    Order Status: Sent Specimen: Blood Updated: 12/12/23 0920    Narrative:      Collection has been rescheduled by LND at 12/12/2023 07:02 Reason:   Unable to collect  Collection has been rescheduled by LND at 12/12/2023 07:02 Reason:   Unable to collect    Blood culture [[id number]] Collected: 12/12/23 0854    Order Status: Sent Specimen: Blood Updated: 12/12/23 0920    Narrative:      Collection has been rescheduled by LND at 12/12/2023 07:01 Reason:   Unable to collect  Collection has been rescheduled by LND at 12/12/2023 07:01 Reason:   Unable to collect    Blood culture [[id number]] Collected: 12/12/23 0853    Order Status: Sent Specimen: Blood Updated: 12/12/23 0920    Narrative:      Collection has been rescheduled by LND at  12/12/2023 07:02 Reason:   Unable to collect  Collection has been rescheduled by LND at 12/12/2023 07:02 Reason:   Unable to collect    Blood culture [[id number]] Collected: 12/10/23 0626    Order Status: Completed Specimen: Blood Updated: 12/12/23 0832     Blood Culture, Routine No Growth to date      No Growth to date      No Growth to date    Urine culture [[id number]] Collected: 12/11/23 0938    Order Status: Completed Specimen: Urine Updated: 12/12/23 0702     Urine Culture, Routine No growth to date    Narrative:      Specimen Source->Urine    Blood culture [[id number]]  (Abnormal) Collected: 12/08/23 1208    Order Status: Completed Specimen: Blood Updated: 12/12/23 0641     Blood Culture, Routine Gram stain aer bottle: budding yeast      Results called to and read back by: Mary Braun LPN on 12MEDSU,      12/10/2023 17:29      AUBREY GLABRATA    Blood culture [[id number]] Collected: 12/11/23 0937    Order Status: Completed Specimen: Blood Updated: 12/11/23 1717     Blood Culture, Routine No Growth to date    Blood culture [[id number]]  (Abnormal)  (Susceptibility) Collected: 12/08/23 1208    Order Status: Completed Specimen: Blood Updated: 12/11/23 0639     Blood Culture, Routine Gram stain aer bottle: Gram positive cocci      Results called to and read back by: Priscilla Boyce RN on 12MEDSU,      12/09/2023 14:53 vcb      ENTEROCOCCUS FAECALIS    Rapid Organism ID by PCR (from Blood culture) [[id number]]  (Abnormal) Collected: 12/08/23 1208    Order Status: Completed Updated: 12/10/23 1853     Enterococcus faecalis Not Detected     Enterococcus faecium Not Detected     Listeria monocytogenes Not Detected     Staphylococcus spp. Not Detected     Staphylococcus aureus Not Detected     Staphylococcus epidermidis Not Detected     Staphylococcus lugdunensis Not Detected     Streptococcus species Not Detected     Streptococcus agalactiae Not Detected     Streptococcus pneumoniae Not Detected     Streptococcus  pyogenes Not Detected     Acinetobacter calcoaceticus/baumannii complex Not Detected     Bacteroides fragilis Not Detected     Enterobacterales Not Detected     Enterobacter cloacae complex Not Detected     Escherichia coli Not Detected     Klebsiella aerogenes Not Detected     Klebsiella oxytoca Not Detected     Klebsiella pneumoniae group Not Detected     Proteus Not Detected     Salmonella sp Not Detected     Serratia marcescens Not Detected     Haemophilus influenzae Not Detected     Neisseria meningtidis Not Detected     Pseudomonas aeruginosa Not Detected     Stenotrophomonas maltophilia Not Detected     Candida albicans Not Detected     Candida auris Not Detected     Juli glabrata Detected     Juli krusei Not Detected     Candida parapsilosis Not Detected     Candida tropicalis Not Detected     Cryptococcus neoformans/gattii Not Detected     CTX-M (ESBL ) Test not applicable     IMP (Carbapenem resistant) Test not applicable     KPC resistance gene (Carbapenem resistant) Test not applicable     mcr-1  Test not applicable     mec A/C  Test not applicable     mec A/C and MREJ (MRSA) gene Test not applicable     NDM (Carbapenem resistant) Test not applicable     OXA-48-like (Carbapenem resistant) Test not applicable     van A/B (VRE gene) Test not applicable     VIM (Carbapenem resistant) Test not applicable    Blood culture [[id number]]  (Abnormal) Collected: 12/06/23 1521    Order Status: Completed Specimen: Blood from Peripheral, Right Arm Updated: 12/10/23 0745     Blood Culture, Routine Gram stain aer bottle: Gram negative rods      Positive results previously called 12/09/2023  00:48 KS3      PROTEUS MIRABILIS ESBL  For susceptibility see order #[id number]  Known ESBL patient      Narrative:      Collection has been rescheduled by [facility] at 12/06/2023 13:17 Reason:   Duplicate order waiting for dr lott to cancel. Spoke to rob GARCIA.  Collection has been rescheduled by [facility] at 12/06/2023 13:17  Reason:   Duplicate order waiting for dr lott to cancel. Spoke to rob GARCIA.    Rapid Organism ID by PCR (from Blood culture) [[id number]]  (Abnormal) Collected: 12/08/23 1208    Order Status: Completed Updated: 12/09/23 1551     Enterococcus faecalis Detected     Enterococcus faecium Not Detected     Listeria monocytogenes Not Detected     Staphylococcus spp. Not Detected     Staphylococcus aureus Not Detected     Staphylococcus epidermidis Not Detected     Staphylococcus lugdunensis Not Detected     Streptococcus species Not Detected     Streptococcus agalactiae Not Detected     Streptococcus pneumoniae Not Detected     Streptococcus pyogenes Not Detected     Acinetobacter calcoaceticus/baumannii complex Not Detected     Bacteroides fragilis Not Detected     Enterobacterales Not Detected     Enterobacter cloacae complex Not Detected     Escherichia coli Not Detected     Klebsiella aerogenes Not Detected     Klebsiella oxytoca Not Detected     Klebsiella pneumoniae group Not Detected     Proteus Not Detected     Salmonella sp Not Detected     Serratia marcescens Not Detected     Haemophilus influenzae Not Detected     Neisseria meningtidis Not Detected     Pseudomonas aeruginosa Not Detected     Stenotrophomonas maltophilia Not Detected     Candida albicans Not Detected     Candida auris Not Detected     Candida glabrata Not Detected     Candida krusei Not Detected     Candida parapsilosis Not Detected     Candida tropicalis Not Detected     Cryptococcus neoformans/gattii Not Detected     CTX-M (ESBL ) Test not applicable     IMP (Carbapenem resistant) Test not applicable     KPC resistance gene (Carbapenem resistant) Test not applicable     mcr-1  Test not applicable     mec A/C  Test not applicable     mec A/C and MREJ (MRSA) gene Test not applicable     NDM (Carbapenem resistant) Test not applicable     OXA-48-like (Carbapenem resistant) Test not applicable     van A/B (VRE gene) Not Detected     VIM  (Carbapenem resistant) Test not applicable    Urine culture [[id number]]  (Abnormal)  (Susceptibility) Collected: 12/05/23 2003    Order Status: Completed Specimen: Urine Updated: 12/08/23 0757     Urine Culture, Routine PROTEUS MIRABILIS ESBL  >100,000 cfu/ml  Known ESBL patient      Narrative:      Specimen Source->Urine    Blood culture [[id number]]  (Abnormal) Collected: 12/06/23 1522    Order Status: Completed Specimen: Blood from Antecubital, Right Hand Updated: 12/08/23 0738     Blood Culture, Routine Gram stain aer bottle: Gram negative rods      Positive results previously called 12/07/2023  18:19 KS3      PROTEUS MIRABILIS ESBL  Known ESBL patient  For susceptibility see order #[id number]      Narrative:      Collection has been rescheduled by [facility] at 12/06/2023 13:17 Reason:   Duplicate order waiting for dr lott to cancel. Spoke to rob GARCIA.  Collection has been rescheduled by [facility] at 12/06/2023 13:17 Reason:   Duplicate order waiting for dr lott to cancel. Spoke to rob GARCIA.    Blood culture x two cultures. Draw prior to antibiotics. [[id number]]  (Abnormal) Collected: 12/05/23 2017    Order Status: Completed Specimen: Blood Updated: 12/08/23 0736     Blood Culture, Routine Gram stain lisa bottle: Gram negative rods      Gram stain aer bottle: Gram negative rods      Results called to and read back by:Rob Dugan RN  12/06/2023      10:39 JBM      Positive results previously called      PROTEUS MIRABILIS ESBL  Known ESBL patient  For susceptibility see order #[id number]      Narrative:      Aerobic and anaerobic  Collection has been rescheduled by [facility] at 12/05/2023 19:29 Reason:   Meline,ER tech said to come back in a minute. Patient needs to be   cleaned.  Collection has been rescheduled by [facility] at 12/05/2023 20:20 Reason:   Done  Collection has been rescheduled by [facility] at 12/05/2023 19:29 Reason:   Meline,ER tech said to come back in a minute. Patient needs to be   cleaned.  Collection has been  rescheduled by JS at 12/05/2023 20:20 Reason:   Done    Blood culture x two cultures. Draw prior to antibiotics. [[id number]]  (Abnormal)  (Susceptibility) Collected: 12/05/23 2007    Order Status: Completed Specimen: Blood Updated: 12/08/23 0735     Blood Culture, Routine Gram stain lisa bottle: Gram negative rods      Gram stain aer bottle: Gram negative rods      Results called to and read back by:Natalie Dugan RN  12/06/2023      10:39 JBM      Positive results previously called      [facility] ESBL  Known ESBL patient      Narrative:      Aerobic and anaerobic  Collection has been rescheduled by JS at 12/05/2023 19:29 Reason:   [employer],ER tech said to come back in a minute. Patient needs to be   cleaned.  Collection has been rescheduled by JS at 12/05/2023 20:20 Reason:   Done  Collection has been rescheduled by JS at 12/05/2023 19:29 Reason:   [employer],ER tech said to come back in a minute. Patient needs to be   cleaned.  Collection has been rescheduled by JS at 12/05/2023 20:20 Reason:   Done    MRSA Screen by PCR [[id number]] Collected: 12/06/23 1645    Order Status: Completed Specimen: Nasopharyngeal Swab from Nasal Updated: 12/06/23 2239     MRSA SCREEN BY PCR Negative    Respiratory Infection Panel (PCR), Nasopharyngeal [[id number]] Collected: 12/06/23 1645    Order Status: Completed Specimen: Nasopharyngeal Swab Updated: 12/06/23 1905     Respiratory Infection Panel Source NP swab     Adenovirus Not Detected     Coronavirus 229E, Common Cold Virus Not Detected     Coronavirus HKU1, Common Cold Virus Not Detected     Coronavirus NL63, Common Cold Virus Not Detected     Coronavirus OC43, Common Cold Virus Not Detected     Comment: Coronavirus strains 229E, HKU1, NL63, and OC43 can cause the common   cold   and are not associated with the respiratory disease outbreak caused   by  the COVID-19 (SARS-CoV-2 novel Coronavirus) strain.           SARS-CoV2 (COVID-19) Qualitative PCR Not Detected      Human Metapneumovirus Not Detected     Human Rhinovirus/Enterovirus Not Detected     Influenza A (subtypes H1, H1-2009,H3) Not Detected     Influenza B Not Detected     Parainfluenza Virus 1 Not Detected     Parainfluenza Virus 2 Not Detected     Parainfluenza Virus 3 Not Detected     Parainfluenza Virus 4 Not Detected     Respiratory Syncytial Virus Not Detected     Bordetella Parapertussis (AV6973) Not Detected     Bordetella pertussis (ptxP) Not Detected     Chlamydia pneumoniae Not Detected     Mycoplasma pneumoniae Not Detected     Comment: Respiratory Infection Panel testing performed by Multiplex PCR.       Narrative:      Respiratory Infection Panel source->NP Swab    Culture, Respiratory with Gram Stain [[id number]]     Order Status: Sent Specimen: Sputum, Induced     Rapid Organism ID by PCR (from Blood culture) [[id number]]  (Abnormal) Collected: 12/05/23 2007    Order Status: Completed Updated: 12/06/23 1151     Enterococcus faecalis Not Detected     Enterococcus faecium Not Detected     Listeria monocytogenes Not Detected     Staphylococcus spp. Not Detected     Staphylococcus aureus Not Detected     Staphylococcus epidermidis Not Detected     Staphylococcus lugdunensis Not Detected     Streptococcus species Not Detected     Streptococcus agalactiae Not Detected     Streptococcus pneumoniae Not Detected     Streptococcus pyogenes Not Detected     Acinetobacter calcoaceticus/baumannii complex Not Detected     Bacteroides fragilis Not Detected     Enterobacterales See species for ID     Comment: critical result(s) called and verbal readback obtained from MARIBEL Anderson by CD3 12/06/2023 11:51          Enterobacter cloacae complex Not Detected     Escherichia coli Not Detected     Klebsiella aerogenes Not Detected     Klebsiella oxytoca Not Detected     Klebsiella pneumoniae group Not Detected     Proteus Detected     Comment: critical result(s) called and verbal readback obtained from Meghann    RADHA Al-ED by CD3 12/06/2023 11:51          Salmonella sp Not Detected     Serratia marcescens Not Detected     Haemophilus influenzae Not Detected     Neisseria meningtidis Not Detected     Pseudomonas aeruginosa Not Detected     Stenotrophomonas maltophilia Not Detected     Candida albicans Not Detected     Candida auris Not Detected     Candida glabrata Not Detected     Candida krusei Not Detected     Candida parapsilosis Not Detected     Candida tropicalis Not Detected     Cryptococcus neoformans/gattii Not Detected     CTX-M (ESBL ) Detected     Comment: critical result(s) called and verbal readback obtained from Meghann Al RN-ED by CD3 12/06/2023 11:51          IMP (Carbapenem resistant) Not Detected     KPC resistance gene (Carbapenem resistant) Not Detected     mcr-1  Test not applicable     mec A/C  Test not applicable     mec A/C and MREJ (MRSA) gene Test not applicable     NDM (Carbapenem resistant) Not Detected     OXA-48-like (Carbapenem resistant) Not Detected     van A/B (VRE gene) Test not applicable     VIM (Carbapenem resistant) Not Detected    Narrative:      Aerobic and anaerobic     critical result(s) called and verbal readback obtained from Meghann Al RN-ED by CD3 12/06/2023 11:51              CURRENT/PREVIOUS VISIT EKG  Results for orders placed or performed during the hospital encounter of 12/05/23   EKG 12-LEAD    Collection Time: 12/11/23  4:19 PM    Narrative    Test Reason : I48.91,I48.92,    Vent. Rate : 081 BPM     Atrial Rate : 081 BPM     P-R Int : 144 ms          QRS Dur : 068 ms      QT Int : 404 ms       P-R-T Axes : 051 026 054 degrees     QTc Int : 469 ms    Normal sinus rhythm  Normal ECG  When compared with ECG of 05-DEC-2023 19:35,  No significant change was found    Referred By: [email or web address]   SELF           Confirmed By:        ECHO 12/11/23 1319  Left Ventricle The left ventricle is normal in size. Normal wall thickness. Normal wall motion. There is normal  systolic function with a visually estimated ejection fraction of 60 - 65%. There is normal diastolic function.   Right Ventricle Normal right ventricular cavity size. Wall thickness is normal. Right ventricle wall motion  is normal. Systolic function is normal.   Left Atrium Normal left atrial size.   Right Atrium Normal right atrial size.   Aortic Valve The aortic valve is structurally normal. There is normal leaflet mobility. Aortic valve peak velocity is 1.25 m/s. Mean gradient is 3 mmHg.   Mitral Valve The mitral valve is structurally normal. There is normal leaflet mobility. The mean pressure gradient across the mitral valve is 2 mmHg at a heart rate of  bpm.   Tricuspid Valve The tricuspid valve is structurally normal. There is normal leaflet mobility.   Pulmonic Valve The pulmonic valve is structurally normal. There is normal leaflet mobility.   IVC/SVC Normal venous pressure at 3 mmHg.   Ascending Aorta Aortic root is normal in size. Ascending aorta is normal.   Pericardium and Other Findings There is no pericardial effusion.   Pulmonary Artery The estimated pulmonary artery systolic pressure is 11 mmHg.       Significant Imaging: I have reviewed all relevant and available imaging results/findings within the past 24 hours.    US Retroperitoneal Complete 12/06/23 0123   Limited evaluation. Probable mild right hydronephrosis.     X-Ray Chest AP Portable 12/05/23 1939   IMPRESSION: No acute pulmonary process    CT Chest Without Contrast 12/06/23 1817   Patchy nodular opacities involving the right upper lobe with surrounding groundglass attenuation, concerning for infectious/inflammatory process including aspiration pneumonitis.   Airspace consolidation with volume loss right middle lobe and right lower lobe, likely representing a combination of pneumonia and atelectasis.    CT Renal Stone Study Abd Pelvis WO 12/06/23 1816   Negative for obstructive uropathy.   Probable 1 mm punctate calculus within the distal  left ureter.   Cholelithiasis.   Additional incidental findings as described above    CT Chest Abdomen Pelvis With IV Contrast (XPD) Routine Oral Contrast 12/11/23 1142  Mild bilateral hydronephrosis and hydroureter to the level of the midpelvis without defined etiology. The urinary bladder contains a Mathur catheter and is nondistended.   Persistence of patchy pulmonary airspace opacities, greater on the right. Correlate with evidence of multifocal pneumonia.   Ill-defined fluid collection within the gallbladder fossa similar to prior studies.   Mildly prominent right hilar lymph node possibly reactive. CT Follow-up recommended.   Multiple additional observations as above.          Roberta Samaniego NP  Date of Service: 12/12/2023  2:04 PM

## 2023-12-12 NOTE — PROGRESS NOTES
[facility] Medicine  Progress Note    Patient name: Steff Johnson  MRN: [id number]  Admit Date: 12/5/2023   LOS: 7 days     SUBJECTIVE:     Principal problem: Severe sepsis    HPI:  Patient is a 63-year-old female with history of severe CVA with aphasia, upper and lower extremity weakness, bed-bound, cognitive impairment, hypertension, hyperlipidemia, type 2 diabetes, COPD presents to the emergency room for concerns of hematuria and hypoxia.  Patient nonverbal at baseline, history provided by ED physician.  Tried to contact daughter, was unsuccessful.  Per ED, patient presented from [facility], staff noticed bloody urine output from her chronic indwelling Mathur catheter as well as hypoxia.  Non-rebreather was placed on patient and this improved her oxygenation saturation.  Brought to ED by EMS.  Per ED physician patient did have episode of emesis, likely aspirating.     On admission temp 100.4, heart rate 111, O2 89%, WBC 40.96, hemoglobin 9.6, potassium 5.2, creatinine 1.7, lactic 3.8, procalcitonin 133.671.  Review of CXR did not show any obvious consolidations.  UA consistent with UTI.      Interval History:      12/12- plan for GT today, will give a unit prbc.  Urology saw her and plan for cysto and possible stents tomorrow.     12/11- c. Glabrata on blood pcr.  Started on micafungin.  TTE pending read, GT ordered, abd ct with hydro and fluid collection in GB fossa.      12/10-  tmax 99.8 overnight, feels warm on exam.  May need to get GT to r/o vegetation.     12/9- patient had blood cx + yesterday GPC.  Started on vanc.  Repeat blood cx.  May need to consider echo to eval for vegetation.     12/8- axillary temp 99.5, wbc 22, blood cx sensitivities still pending.      12/7- she is still running low grade temp overnight, wbc improving but still elevated at 24. Continue IV abx.   Wound and ID following    Scheduled Meds:   amLODIPine  10 mg Oral Daily    apixaban  2.5 mg Per G  Tube BID    aspirin  81 mg Per G Tube Daily    atorvastatin  80 mg Per G Tube QHS    famotidine  20 mg Oral Daily    insulin detemir U-100  35 Units Subcutaneous Daily    ipratropium  0.5 mg Nebulization Q6H    levetiracetam  500 mg Per G Tube BID    meropenem (MERREM) IVPB  1 g Intravenous Q8H    metoprolol tartrate  25 mg Per G Tube BID    micafungin (MYCAMINE) IVPB  100 mg Intravenous Q24H    polyethylene glycol  17 g Per G Tube Daily    tamsulosin  0.4 mg Oral Daily    vancomycin (VANCOCIN) IV (PEDS and ADULTS)  1,000 mg Intravenous Q24H     Continuous Infusions:      PRN Meds:0.9%  NaCl infusion (for blood administration), acetaminophen, albuterol-ipratropium, dextrose 50%, dextrose 50%, glucagon (human recombinant), glucose, glucose, hydrALAZINE, insulin aspart U-100, magnesium oxide, magnesium oxide, ondansetron, potassium bicarbonate, potassium bicarbonate, potassium bicarbonate, potassium, sodium phosphates, potassium, sodium phosphates, potassium, sodium phosphates, sodium chloride 0.9%, Pharmacy to dose Vancomycin consult **AND** vancomycin - pharmacy to dose    Review of patient's allergies indicates:  No Known Allergies    Review of Systems: As per interval history    OBJECTIVE:     Vital Signs (Most Recent)  Temp: 98 °F (36.7 °C) (12/12/23 1656)  Pulse: 92 (12/12/23 1656)  Resp: 20 (12/12/23 1656)  BP: (!) 166/83 (12/12/23 1656)  SpO2: 99 % (12/12/23 1656)    Vital Signs Range (Last 24H):  Temp:  [97.8 °F (36.6 °C)-99 °F (37.2 °C)]   Pulse:  [78-96]   Resp:  [18-29]   BP: (145-172)/(71-88)   SpO2:  [94 %-100 %]     I & O (Last 24H):  Intake/Output Summary (Last 24 hours) at 12/12/2023 1706  Last data filed at 12/12/2023 1354  Gross per 24 hour   Intake 300 ml   Output --   Net 300 ml         Physical Exam:    Gen- non-verbal, min responsive.  Eyes open, doesn't track  CV- RRR  Pulm- even respirations, no tachypnea, accessory use.    GI- soft, non-tender.  PEG LLQ  - deferred  Skin- warm, dry.         Laboratory:  I have reviewed all pertinent lab results within the past 24 hours.      ASSESSMENT/PLAN:         Active Hospital Problems    Diagnosis  POA    *Severe sepsis [A41.9, R65.20]  Yes    ESBL (extended spectrum beta-lactamase) producing bacteria infection [A49.9, Z16.12]  Yes    Acute respiratory failure with hypoxia [J96.01]  Yes    Bacteremia due to Proteus species [R78.81, B96.4]  Yes    Bedridden [Z74.01]  Not Applicable    Blisters of multiple sites [R23.8]  Yes    Chronic indwelling Mathur catheter [Z97.8]  Not Applicable    Cognitive impairment [R41.89]  Yes    Hydronephrosis [N13.30]  Yes    Hypoxia [R09.02]  Yes    Pressure injury of sacral region, stage 3 [L89.153]  Yes    Aspiration pneumonia of right lung due to vomit [J69.0]  Yes    Leucocytosis [D72.829]  Yes    UTI (urinary tract infection) [N39.0]  Yes    Hyperkalemia [E87.5]  Yes    Pressure injury of sacral region, unstageable [L89.150]  Yes    Aphasia [R47.01]  Yes    Cognitive communication deficit [R41.841]  Yes    Combined forms of age-related cataract, bilateral [H25.813]  Yes    COURTNEY (acute kidney injury) [N17.9]  Yes    CVA (cerebral vascular accident) [I63.9]  Yes    Mixed hyperlipidemia [E78.2]  Yes    Hyperglycemia due to type 2 diabetes mellitus [E11.65]  Yes    Hypertension associated with diabetes [E11.59, I15.2]  Yes      Resolved Hospital Problems   No resolved problems to display.         Plan:     Severe sepsis, improved  Repeat lactic down to 1.2  Continue empiric abx  Follow up cx s/s   12/8 bcx + gpc  Start vanc, repeat blood cx  PCR with c. Glabrata  Micafungin started  echo/GT today  CT with gb fossa fluid, BL hydro  Cysto planned per urology  No plan for cx/bx of fluid collection per IR  Follow up with ID    CVA (cerebral vascular accident)  History of CVA, nonverbal and bed-bound at baseline  Has PEG tube, nutrition consulted for tube feedings.    Has chronic indwelling catheter, replaced in the emergency  room.        Hyperkalemia  This patient has hyperkalemia which is uncontrolled. We will monitor for arrhythmias with EKG or continuous telemetry. We will treat the hyperkalemia with IV fluids, follow up repeat BMP. The likely etiology of the hyperkalemia is COURTNEY.  The patients latest potassium has been reviewed and the results are listed below      Recent Labs   Lab 12/05/23 2134   K 5.2*               UTI (urinary tract infection)  Continue with empiric IV antibiotics   Follow up CBC and CMP        Pressure injury of sacral region, unstageable  Wound care consulted        Aphasia  Noted        Cognitive communication deficit  Chronic issue         COURTNEY (acute kidney injury)  Patient with acute kidney injury/acute renal failure likely due to pre-renal azotemia due to dehydration COURTNEY is currently stable. Baseline creatinine  0.6  - Labs reviewed- Renal function/electrolytes with Estimated Creatinine Clearance: 25.4 mL/min (A) (based on SCr of 1.7 mg/dL (H)). according to latest data. Monitor urine output and serial BMP and adjust therapy as needed. Avoid nephrotoxins and renally dose meds for GFR listed above.     Continue IV fluids        Mixed hyperlipidemia  Continue outpatient statin        Hyperglycemia due to type 2 diabetes mellitus  Continue Levemir, started patient on sliding scale        Hypertension associated with diabetes  Continue outpatient antihypertensives, monitor blood pressure    VTE Risk Mitigation (From admission, onward)           Ordered     apixaban tablet 2.5 mg  2 times daily         12/05/23 2300     IP VTE HIGH RISK PATIENT  Once         12/05/23 2300     Place sequential compression device  Until discontinued         12/05/23 2300                        Department Hospital Medicine  [facility]  Yazan Denise MD  Date of service: 12/12/2023

## 2023-12-12 NOTE — NURSING TRANSFER
Nursing Transfer Note      12/12/2023   2:18 PM    Nurse giving handoff:Catherine Perez RN  Nurse receiving handoff:Kirstin Olvera LPN    Reason patient is being transferred: returned to inpatient room s/p GT    Transfer From: [facility]  Transfer To: Room # 1217    Transfer via bed    Transfer with cardiac monitoring    Transported by 1200 unit nurse staff    Transfer Vital Signs:  See flowsheet    Chart send with patient: Yes    Notified: daughter

## 2023-12-12 NOTE — TRANSFER OF CARE
"Anesthesia Transfer of Care Note    Patient: Steff Johnson    Procedure(s) Performed: Procedure(s) (LRB):  Transesophageal echo (GT) intra-procedure log documentation (N/A)    Patient location: Cath Lab    Anesthesia Type: general    Transport from OR: Transported from OR on room air with adequate spontaneous ventilation    Post pain: adequate analgesia    Post assessment: no apparent anesthetic complications and tolerated procedure well    Post vital signs: stable    Level of consciousness: awake    Nausea/Vomiting: no nausea/vomiting    Transfer of care protocol was followed      Last vitals: Visit Vitals  BP (!) 154/72   Pulse 96   Temp 36.6 °C (97.8 °F)   Resp 18   Ht 4' 11" (1.499 m)   Wt 61.8 kg (136 lb 4.3 oz)   SpO2 98%   BMI 27.52 kg/m²     "

## 2023-12-12 NOTE — CONSULTS
[facility]  Department of Cardiology  Consult Note      PATIENT NAME: Steff Johnson    MRN: [id number]  TODAY'S DATE: 12/12/2023  ADMIT DATE: 12/5/2023                          CONSULT REQUESTED BY: Yazan Denise,*    SUBJECTIVE     PRINCIPAL PROBLEM: Severe sepsis      REASON FOR CONSULT:  For GT      HPI:  63-year-old lady with history of severe CVA with hemiparesis and cognitive impairment arterial hypertension type 2 diabetes and COPD admitted with hematuria noted to have severe hypoxemia.  She is noted to have Proteus bacteremia consult local to consider transesophageal echocardiography.  Patient has noted to have profound anemia with a hemoglobin of 6.8 yesterday With microcytic indices.  Transthoracic echo did not show any valvular abnormalities.  Ejection fraction 60-65%.      HPI: Patient is a 63-year-old female with history of severe CVA with aphasia, upper and lower extremity weakness, bed-bound, cognitive impairment, hypertension, hyperlipidemia, type 2 diabetes, COPD presents to the emergency room for concerns of hematuria and hypoxia.  Patient nonverbal at baseline, history provided by ED physician.  Tried to contact daughter, was unsuccessful.  Per ED, patient presented from [facility], staff noticed bloody urine output from her chronic indwelling Mathur catheter as well as hypoxia.  Non-rebreather was placed on patient and this improved her oxygenation saturation.  Brought to ED by EMS.  Per ED physician patient did have episode of emesis, likely aspirating.     On admission temp 100.4, heart rate 111, O2 89%, WBC 40.96, hemoglobin 9.6, potassium 5.2, creatinine 1.7, lactic 3.8, procalcitonin 133.671.  Review of CXR did not show any obvious consolidations.  UA consistent with UTI.       Review of patient's allergies indicates:  No Known Allergies    Past Medical History:   Diagnosis Date    Arthritis     Complete tear of left rotator cuff 11/09/2017    COPD (chronic  obstructive pulmonary disease)     COVID-19 infection 07/02/2021    Diabetes mellitus     H/o Cerebrovascular accident (CVA) of left pontine structure 12/12/2019    Hypertension     Personal history of colonic polyps     Stroke     Wears glasses      Past Surgical History:   Procedure Laterality Date    COLONOSCOPY N/A 4/11/2017    Procedure: COLONOSCOPY;  Surgeon: Jared Antonio MD;  Location: [facility];  Service: Endoscopy;  Laterality: N/A;    HYSTERECTOMY      OOPHORECTOMY      SHOULDER SURGERY      R    TRANSESOPHAGEAL ECHOCARDIOGRAM WITH POSSIBLE CARDIOVERSION (GT W/ POSS CARDIOVERSION) N/A 5/4/2023    Procedure: Transesophageal echo (GT) intra-procedure log documentation;  Surgeon: Blade Phillip MD;  Location: [facility] CATH/EP LAB;  Service: Cardiology;  Laterality: N/A;     Social History     Tobacco Use    Smoking status: Never    Smokeless tobacco: Never   Substance Use Topics    Alcohol use: No    Drug use: No            OBJECTIVE     VITAL SIGNS (Most Recent)  Temp: 98.7 °F (37.1 °C) (12/12/23 0843)  Pulse: 87 (12/12/23 0843)  Resp: 20 (12/12/23 0843)  BP: (!) 156/79 (12/12/23 1015)  SpO2: 97 % (12/12/23 0843)    VENTILATION STATUS  Resp: 20 (12/12/23 0843)  SpO2: 97 % (12/12/23 0843)         I & O (Last 24H):  Intake/Output Summary (Last 24 hours) at 12/12/2023 1141  Last data filed at 12/11/2023 1701  Gross per 24 hour   Intake --   Output 1900 ml   Net -1900 ml       WEIGHTS  Wt Readings from Last 1 Encounters:   12/06/23 1500 61.8 kg (136 lb 4.3 oz)   12/05/23 1910 56.2 kg (123 lb 14.4 oz)             HOME MEDICATIONS:  No current facility-administered medications on file prior to encounter.     Current Outpatient Medications on File Prior to Encounter   Medication Sig Dispense Refill    amLODIPine (NORVASC) 10 MG tablet Take 1 tablet (10 mg total) by mouth once daily. (Patient taking differently: 10 mg by Per G Tube route once daily.) 90 tablet 3    apixaban (ELIQUIS) 2.5 mg Tab 2.5 mg by Per G  Tube route 2 (two) times daily.      aspirin 81 MG Chew Take 1 tablet (81 mg total) by mouth once daily. (Patient taking differently: 81 mg by Per G Tube route once daily.) 30 tablet 0    atorvastatin (LIPITOR) 80 MG tablet Take 1 tablet (80 mg total) by mouth every evening. (Patient taking differently: 80 mg by Per G Tube route every evening.) 30 tablet 0    folic acid (FOLVITE) 1 MG tablet 1 tablet by Per G Tube route every morning.      furosemide (LASIX) 40 MG tablet 40 mg by Per G Tube route once daily.      insulin (LANTUS SOLOSTAR U-100 INSULIN) glargine 100 units/mL SubQ pen Inject 35 Units into the skin once daily.      insulin aspart U-100 (NOVOLOG) 100 unit/mL injection Inject 4 Units into the skin As instructed for High Blood Sugar (Four times daily per sliding scale). 0 -150 = 0 units  151 - 200 = 4 units  201 - 250 = 8 units  251 - 300 = 10 units  301 - 350 = 12 units  351 - 400 = 16 units  401 - 1000 = 20 units call MD      latanoprost 0.005 % ophthalmic solution Place 1 drop into both eyes once daily. (Patient taking differently: Place 1 drop into both eyes every evening.) 7.5 mL 6    levETIRAcetam (KEPPRA) 100 mg/mL Soln 500 mg by Per G Tube route 2 (two) times daily.      losartan (COZAAR) 100 MG tablet Take 1 tablet (100 mg total) by mouth once daily. (Patient taking differently: 100 mg by Per G Tube route once daily.) 90 tablet 3    magnesium oxide (MAG-OX) 400 mg (241.3 mg magnesium) tablet 400 mg by Per G Tube route once daily.      metFORMIN (GLUCOPHAGE) 500 MG tablet 1,000 mg by Per G Tube route 2 (two) times daily with meals.      metoprolol tartrate (LOPRESSOR) 25 MG tablet 25 mg by Per G Tube route 2 (two) times daily.      multivitamin (THERAGRAN) per tablet 1 tablet by Per G Tube route once daily.      polyethylene glycol (GLYCOLAX) 17 gram PwPk 17 g by Per G Tube route once daily.      thiamine (VITAMIN B-1) 100 MG tablet 100 mg by Per G Tube route once daily.      baclofen (LIORESAL) 5  mg Tab tablet 5 mg by Per G Tube route every 8 (eight) hours as needed (muscle spasms).      cloNIDine (CATAPRES) 0.1 MG tablet Take 1 tablet (0.1 mg total) by mouth 3 (three) times daily. (Patient taking differently: Take 0.1 mg by mouth 4 (four) times daily as needed (For systolic over 160).) 90 tablet 11    metoprolol succinate (TOPROL-XL) 50 MG 24 hr tablet Take 1 tablet (50 mg total) by mouth once daily. 30 tablet 0    minoxidiL (LONITEN) 10 MG Tab Take 10 mg by mouth 2 (two) times daily.      tiotropium (SPIRIVA) 18 mcg inhalation capsule Inhale 1 capsule (18 mcg total) into the lungs once daily. Controller 30 capsule 0    [DISCONTINUED] blood-glucose meter (TRUE METRIX GLUCOSE METER) kit Use as instructed 1 each 0    [DISCONTINUED] lancing device (TRUEDRAW LANCING DEVICE) Misc Inject 1 Device into the skin 2 (two) times daily. 1 each 3       SCHEDULED MEDS:   amLODIPine  10 mg Oral Daily    apixaban  2.5 mg Per G Tube BID    aspirin  81 mg Per G Tube Daily    atorvastatin  80 mg Per G Tube QHS    famotidine  20 mg Oral Daily    insulin detemir U-100  35 Units Subcutaneous Daily    ipratropium  0.5 mg Nebulization Q6H    levetiracetam  500 mg Per G Tube BID    meropenem (MERREM) IVPB  1 g Intravenous Q8H    metoprolol tartrate  25 mg Per G Tube BID    micafungin (MYCAMINE) IVPB  100 mg Intravenous Q24H    polyethylene glycol  17 g Per G Tube Daily    tamsulosin  0.4 mg Oral Daily    vancomycin (VANCOCIN) IV (PEDS and ADULTS)  1,000 mg Intravenous Q24H       CONTINUOUS INFUSIONS:   lactated ringers 125 mL/hr at 12/10/23 0953       PRN MEDS:0.9%  NaCl infusion (for blood administration), acetaminophen, albuterol-ipratropium, dextrose 50%, dextrose 50%, glucagon (human recombinant), glucose, glucose, hydrALAZINE, insulin aspart U-100, magnesium oxide, magnesium oxide, ondansetron, potassium bicarbonate, potassium bicarbonate, potassium bicarbonate, potassium, sodium phosphates, potassium, sodium phosphates,  potassium, sodium phosphates, sodium chloride 0.9%, Pharmacy to dose Vancomycin consult **AND** vancomycin - pharmacy to dose    LABS AND DIAGNOSTICS     CBC LAST 3 DAYS  Recent Labs   Lab 12/10/23  0626 12/11/23  0629 12/12/23  0855   WBC 24.88* 24.74* 21.62*   RBC 2.85* 2.69* 2.93*   HGB 7.3* 6.8* 7.4*   HCT 21.9* 20.5* 22.1*   MCV 77* 76* 75*   MCH 25.6* 25.3* 25.3*   MCHC 33.3 33.2 33.5   RDW 22.9* 22.5* 22.5*    582* 766*   MPV 11.5 10.9 10.6   GRAN 76.0* 73.6*  18.2* 75.4*  16.3*   LYMPH 14.0*  CANCELED 11.0*  2.7 11.1*  2.4   MONO 5.0  CANCELED 7.6  1.9* 6.9  1.5*   BASO CANCELED 0.11 0.11   NRBC 0 0 0       COAGULATION LAST 3 DAYS  Recent Labs   Lab 12/05/23 2134   INR 1.1   APTT 29.9       CHEMISTRY LAST 3 DAYS  Recent Labs   Lab 12/05/23  2028 12/05/23  2134 12/06/23  0526 12/10/23  0626 12/11/23  0629 12/12/23  0855   NA  --  135*   < > 140 136 136   K  --  5.2*   < > 4.3 4.2 3.9   CL  --  94*   < > 108 105 106   CO2  --  28   < > 26 25 23   ANIONGAP  --  13   < > 6* 6* 7*   BUN  --  67*   < > 24* 21 15   CREATININE  --  1.7*   < > 0.6 0.6 0.5   GLU  --  221*   < > 140* 123* 120*   CALCIUM  --  9.6   < > 8.5* 8.3* 8.6*   PH 7.540*  --   --   --   --   --    MG  --   --    < > 2.0 2.0 1.9   ALBUMIN  --  3.3*  --   --   --   --    PROT  --  7.4  --   --   --   --    ALKPHOS  --  98  --   --   --   --    ALT  --  30  --   --   --   --    AST  --  35  --   --   --   --    BILITOT  --  0.5  --   --   --   --     < > = values in this interval not displayed.       CARDIAC PROFILE LAST 3 DAYS  Recent Labs   Lab 12/05/23 2134   *   TROPONINIHS 6.2       ENDOCRINE LAST 3 DAYS  Recent Labs   Lab 12/05/23 2134 12/09/23  0547 12/11/23  0937   PROCAL 133.671* 17.936* 4.179*       LAST ARTERIAL BLOOD GAS  ABG  Recent Labs   Lab 12/05/23 2028   PH 7.540*   PO2 248*   PCO2 34.3*   HCO3 29.3*   BE 7*       LAST 7 DAYS MICROBIOLOGY   Microbiology Results (last 7 days)       Procedure Component  Value Units Date/Time    Blood culture [[id number]] Collected: 12/11/23 0628    Order Status: Completed Specimen: Blood Updated: 12/12/23 1032     Blood Culture, Routine No Growth to date      No Growth to date    Blood culture [[id number]] Collected: 12/12/23 0853    Order Status: Sent Specimen: Blood Updated: 12/12/23 0920    Narrative:      Collection has been rescheduled by LND at 12/12/2023 07:02 Reason:   Unable to collect  Collection has been rescheduled by LND at 12/12/2023 07:02 Reason:   Unable to collect    Blood culture [[id number]] Collected: 12/12/23 0854    Order Status: Sent Specimen: Blood Updated: 12/12/23 0920    Narrative:      Collection has been rescheduled by LND at 12/12/2023 07:01 Reason:   Unable to collect  Collection has been rescheduled by LND at 12/12/2023 07:01 Reason:   Unable to collect    Blood culture [[id number]] Collected: 12/12/23 0853    Order Status: Sent Specimen: Blood Updated: 12/12/23 0920    Narrative:      Collection has been rescheduled by LND at 12/12/2023 07:02 Reason:   Unable to collect  Collection has been rescheduled by LND at 12/12/2023 07:02 Reason:   Unable to collect    Blood culture [[id number]] Collected: 12/10/23 0626    Order Status: Completed Specimen: Blood Updated: 12/12/23 0832     Blood Culture, Routine No Growth to date      No Growth to date      No Growth to date    Urine culture [[id number]] Collected: 12/11/23 0938    Order Status: Completed Specimen: Urine Updated: 12/12/23 0702     Urine Culture, Routine No growth to date    Narrative:      Specimen Source->Urine    Blood culture [[id number]]  (Abnormal) Collected: 12/08/23 1208    Order Status: Completed Specimen: Blood Updated: 12/12/23 0641     Blood Culture, Routine Gram stain aer bottle: budding yeast      Results called to and read back by: Mary Braun LPN on [facility],      12/10/2023 17:29      AUBREY GLABRATA    Blood culture [[id number]] Collected: 12/11/23 0937    Order Status:  Completed Specimen: Blood Updated: 12/11/23 1717     Blood Culture, Routine No Growth to date    Blood culture [[id number]]  (Abnormal)  (Susceptibility) Collected: 12/08/23 1208    Order Status: Completed Specimen: Blood Updated: 12/11/23 0639     Blood Culture, Routine Gram stain aer bottle: Gram positive cocci      Results called to and read back by: Priscilla Boyce RN on [address] [place] [address],      12/09/2023 14:53 vcb      ENTEROCOCCUS FAECALIS    Rapid Organism ID by PCR (from Blood culture) [[id number]]  (Abnormal) Collected: 12/08/23 1208    Order Status: Completed Updated: 12/10/23 1853     Enterococcus faecalis Not Detected     Enterococcus faecium Not Detected     Listeria monocytogenes Not Detected     Staphylococcus spp. Not Detected     Staphylococcus aureus Not Detected     Staphylococcus epidermidis Not Detected     Staphylococcus lugdunensis Not Detected     Streptococcus species Not Detected     Streptococcus agalactiae Not Detected     Streptococcus pneumoniae Not Detected     Streptococcus pyogenes Not Detected     Acinetobacter calcoaceticus/baumannii complex Not Detected     Bacteroides fragilis Not Detected     Enterobacterales Not Detected     Enterobacter cloacae complex Not Detected     Escherichia coli Not Detected     Klebsiella aerogenes Not Detected     Klebsiella oxytoca Not Detected     Klebsiella pneumoniae group Not Detected     Proteus Not Detected     Salmonella sp Not Detected     Serratia marcescens Not Detected     Haemophilus influenzae Not Detected     Neisseria meningtidis Not Detected     Pseudomonas aeruginosa Not Detected     Stenotrophomonas maltophilia Not Detected     Candida albicans Not Detected     Candida auris Not Detected     Juli glabrata Detected     Juli krusei Not Detected     Candida parapsilosis Not Detected     Candida tropicalis Not Detected     Cryptococcus neoformans/gattii Not Detected     CTX-M (ESBL ) Test not applicable     IMP (Carbapenem resistant)  Test not applicable     KPC resistance gene (Carbapenem resistant) Test not applicable     mcr-1  Test not applicable     mec A/C  Test not applicable     mec A/C and MREJ (MRSA) gene Test not applicable     NDM (Carbapenem resistant) Test not applicable     OXA-48-like (Carbapenem resistant) Test not applicable     van A/B (VRE gene) Test not applicable     VIM (Carbapenem resistant) Test not applicable    Blood culture [[id number]]  (Abnormal) Collected: 12/06/23 1521    Order Status: Completed Specimen: Blood from Peripheral, Right Arm Updated: 12/10/23 0745     Blood Culture, Routine Gram stain aer bottle: Gram negative rods      Positive results previously called 12/09/2023  00:48 KS3      PROTEUS MIRABILIS ESBL  For susceptibility see order #[id number]  Known ESBL patient      Narrative:      Collection has been rescheduled by [facility] at 12/06/2023 13:17 Reason:   Duplicate order waiting for dr lott to cancel. Spoke to rob RN.  Collection has been rescheduled by [facility] at 12/06/2023 13:17 Reason:   Duplicate order waiting for dr lott to cancel. Spoke to rob GARCIA.    Rapid Organism ID by PCR (from Blood culture) [[id number]]  (Abnormal) Collected: 12/08/23 1208    Order Status: Completed Updated: 12/09/23 1551     Enterococcus faecalis Detected     Enterococcus faecium Not Detected     Listeria monocytogenes Not Detected     Staphylococcus spp. Not Detected     Staphylococcus aureus Not Detected     Staphylococcus epidermidis Not Detected     Staphylococcus lugdunensis Not Detected     Streptococcus species Not Detected     Streptococcus agalactiae Not Detected     Streptococcus pneumoniae Not Detected     Streptococcus pyogenes Not Detected     Acinetobacter calcoaceticus/baumannii complex Not Detected     Bacteroides fragilis Not Detected     Enterobacterales Not Detected     Enterobacter cloacae complex Not Detected     Escherichia coli Not Detected     Klebsiella aerogenes Not Detected     Klebsiella oxytoca Not  Detected     Klebsiella pneumoniae group Not Detected     Proteus Not Detected     Salmonella sp Not Detected     Serratia marcescens Not Detected     Haemophilus influenzae Not Detected     Neisseria meningtidis Not Detected     Pseudomonas aeruginosa Not Detected     Stenotrophomonas maltophilia Not Detected     Candida albicans Not Detected     Candida auris Not Detected     Candida glabrata Not Detected     Candida krusei Not Detected     Candida parapsilosis Not Detected     Candida tropicalis Not Detected     Cryptococcus neoformans/gattii Not Detected     CTX-M (ESBL ) Test not applicable     IMP (Carbapenem resistant) Test not applicable     KPC resistance gene (Carbapenem resistant) Test not applicable     mcr-1  Test not applicable     mec A/C  Test not applicable     mec A/C and MREJ (MRSA) gene Test not applicable     NDM (Carbapenem resistant) Test not applicable     OXA-48-like (Carbapenem resistant) Test not applicable     van A/B (VRE gene) Not Detected     VIM (Carbapenem resistant) Test not applicable    Urine culture [[id number]]  (Abnormal)  (Susceptibility) Collected: 12/05/23 2003    Order Status: Completed Specimen: Urine Updated: 12/08/23 0757     Urine Culture, Routine PROTEUS MIRABILIS ESBL  >100,000 cfu/ml  Known ESBL patient      Narrative:      Specimen Source->Urine    Blood culture [[id number]]  (Abnormal) Collected: 12/06/23 1522    Order Status: Completed Specimen: Blood from Antecubital, Right Hand Updated: 12/08/23 0738     Blood Culture, Routine Gram stain aer bottle: Gram negative rods      Positive results previously called 12/07/2023  18:19 KS3      PROTEUS MIRABILIS ESBL  Known ESBL patient  For susceptibility see order #[id number]      Narrative:      Collection has been rescheduled by [facility] at 12/06/2023 13:17 Reason:   Duplicate order waiting for dr lott to cancel. Spoke to rob GARCIA.  Collection has been rescheduled by [facility] at 12/06/2023 13:17 Reason:   Duplicate  order waiting for dr lott to cancel. Spoke to rob GARCIA.    Blood culture x two cultures. Draw prior to antibiotics. [[id number]]  (Abnormal) Collected: 12/05/23 2017    Order Status: Completed Specimen: Blood Updated: 12/08/23 0736     Blood Culture, Routine Gram stain lisa bottle: Gram negative rods      Gram stain aer bottle: Gram negative rods      Results called to and read back by:Rob Dugan RN  12/06/2023      10:39 JBM      Positive results previously called      PROTEUS MIRABILIS ESBL  Known ESBL patient  For susceptibility see order #[id number]      Narrative:      Aerobic and anaerobic  Collection has been rescheduled by JS at 12/05/2023 19:29 Reason:   Meline,ER tech said to come back in a minute. Patient needs to be   cleaned.  Collection has been rescheduled by JS at 12/05/2023 20:20 Reason:   Done  Collection has been rescheduled by JS at 12/05/2023 19:29 Reason:   Meline,ER tech said to come back in a minute. Patient needs to be   cleaned.  Collection has been rescheduled by [facility] at 12/05/2023 20:20 Reason:   Done    Blood culture x two cultures. Draw prior to antibiotics. [[id number]]  (Abnormal)  (Susceptibility) Collected: 12/05/23 2007    Order Status: Completed Specimen: Blood Updated: 12/08/23 0735     Blood Culture, Routine Gram stain lisa bottle: Gram negative rods      Gram stain aer bottle: Gram negative rods      Results called to and read back by:Rob Dugan RN  12/06/2023      10:39 JBM      Positive results previously called      PROTEUS MIRABILIS ESBL  Known ESBL patient      Narrative:      Aerobic and anaerobic  Collection has been rescheduled by JS at 12/05/2023 19:29 Reason:   Meline,ER tech said to come back in a minute. Patient needs to be   cleaned.  Collection has been rescheduled by JS at 12/05/2023 20:20 Reason:   Done  Collection has been rescheduled by JS at 12/05/2023 19:29 Reason:   Meline,ER tech said to come back in a minute. Patient needs to be    cleaned.  Collection has been rescheduled by DORIS at 12/05/2023 20:20 Reason:   Done    MRSA Screen by PCR [[id number]] Collected: 12/06/23 1645    Order Status: Completed Specimen: Nasopharyngeal Swab from Nasal Updated: 12/06/23 2239     MRSA SCREEN BY PCR Negative    Respiratory Infection Panel (PCR), Nasopharyngeal [[id number]] Collected: 12/06/23 1645    Order Status: Completed Specimen: Nasopharyngeal Swab Updated: 12/06/23 1905     Respiratory Infection Panel Source NP swab     Adenovirus Not Detected     Coronavirus 229E, Common Cold Virus Not Detected     Coronavirus HKU1, Common Cold Virus Not Detected     Coronavirus NL63, Common Cold Virus Not Detected     Coronavirus OC43, Common Cold Virus Not Detected     Comment: Coronavirus strains 229E, HKU1, NL63, and OC43 can cause the common   cold   and are not associated with the respiratory disease outbreak caused   by  the COVID-19 (SARS-CoV-2 novel Coronavirus) strain.           SARS-CoV2 (COVID-19) Qualitative PCR Not Detected     Human Metapneumovirus Not Detected     Human Rhinovirus/Enterovirus Not Detected     Influenza A (subtypes H1, H1-2009,H3) Not Detected     Influenza B Not Detected     Parainfluenza Virus 1 Not Detected     Parainfluenza Virus 2 Not Detected     Parainfluenza Virus 3 Not Detected     Parainfluenza Virus 4 Not Detected     Respiratory Syncytial Virus Not Detected     Bordetella Parapertussis (ST9088) Not Detected     Bordetella pertussis (ptxP) Not Detected     Chlamydia pneumoniae Not Detected     Mycoplasma pneumoniae Not Detected     Comment: Respiratory Infection Panel testing performed by Multiplex PCR.       Narrative:      Respiratory Infection Panel source->NP Swab    Culture, Respiratory with Gram Stain [[id number]]     Order Status: Sent Specimen: Sputum, Induced     Rapid Organism ID by PCR (from Blood culture) [[id number]]  (Abnormal) Collected: 12/05/23 2007    Order Status: Completed Updated: 12/06/23 1151      Enterococcus faecalis Not Detected     Enterococcus faecium Not Detected     Listeria monocytogenes Not Detected     Staphylococcus spp. Not Detected     Staphylococcus aureus Not Detected     Staphylococcus epidermidis Not Detected     Staphylococcus lugdunensis Not Detected     Streptococcus species Not Detected     Streptococcus agalactiae Not Detected     Streptococcus pneumoniae Not Detected     Streptococcus pyogenes Not Detected     Acinetobacter calcoaceticus/baumannii complex Not Detected     Bacteroides fragilis Not Detected     Enterobacterales See species for ID     Comment: critical result(s) [facility] 12/06/2023 11:51          Enterobacter cloacae complex Not Detected     Escherichia coli Not Detected     Klebsiella aerogenes Not Detected     Klebsiella oxytoca Not Detected     Klebsiella pneumoniae group Not Detected     Proteus Detected     Comment: critical result(s) called and [facility] 12/06/2023 11:51          Salmonella sp Not Detected     Serratia marcescens Not Detected     Haemophilus influenzae Not Detected     Neisseria meningtidis Not Detected     Pseudomonas aeruginosa Not Detected     Stenotrophomonas maltophilia Not Detected     Candida albicans Not Detected     Candida auris Not Detected     Candida glabrata Not Detected     Candida krusei Not Detected     Candida parapsilosis Not Detected     Candida tropicalis Not Detected     Cryptococcus neoformans/gattii Not Detected     CTX-M (ESBL ) Detected     Comment: critical result(s) called and [facility] 12/06/2023 11:51          IMP (Carbapenem resistant) Not Detected     KPC resistance gene (Carbapenem resistant) Not Detected     mcr-1  Test not applicable     mec A/C  Test not applicable     mec A/C and MREJ (MRSA) gene Test not applicable     NDM (Carbapenem resistant) Not  Detected     OXA-48-like (Carbapenem resistant) Not Detected     van A/B (VRE gene) Test not applicable     VIM (Carbapenem resistant) Not Detected    Narrative:      Aerobic and anaerobic     critical result(s) called and verbal readback obtained from Meghann Al RN-ED by CHANDA 12/06/2023 11:51            MOST RECENT IMAGING  Echo    Left Ventricle: The left ventricle is normal in size. Normal wall   thickness. Normal wall motion. There is normal systolic function with a   visually estimated ejection fraction of 60 - 65%. There is normal   diastolic function.    Right Ventricle: Normal right ventricular cavity size. Wall thickness   is normal. Right ventricle wall motion  is normal. Systolic function is   normal.    IVC/SVC: Normal venous pressure at 3 mmHg.  CT Chest Abdomen Pelvis With IV Contrast (XPD) Routine Oral Contrast  CMS MANDATED QUALITY DATA - CT RADIATION 436    All CT scans at this facility utilize dose modulation, iterative reconstruction, and/or weight based dosing when appropriate to reduce radiation dose to as low as reasonably achievable.    CT of the chest, abdomen and pelvis with contrast    HISTORY: Bacteremia.    Examination utilizes 100 mL Omnipaque 350. Comparison is made to prior studies, the most recent dated 12/6/2023.    Patchy bilateral airspace opacities and/or volume loss persist, right greater than left. There has been some improvement in aeration of the right lower lobe and interval. There has been slight progression of airspace opacities in the left lower lobe. Correlate with evidence of multifocal pneumonia. The central airways are patent. There are no significant pleural fluid accumulations.    The heart is mildly enlarged. There is no significant pericardial fluid. Mildly enlarged right hilar lymph nodes measure up to 15 mm in short axis dimension, perhaps reactive in etiology but requiring CT follow-up to assess resolution.    A masslike lesion along the anterior margin of the  spleen does not appear significantly changed. There are no focal hepatic parenchymal abnormalities observed. Small amount of fluid adjacent to the gallbladder fossa persists. The gallbladder appears otherwise unremarkable. No biliary dilatation is observed.    The pancreas and adrenal glands are unremarkable. The kidneys are normal in size and position. There is mild bilateral hydronephrosis and hydroureter to the level of the midpelvis without an obstructing etiology representing a change compared to recent CT. Probable subcentimeter cysts within the mid left kidney remain unchanged. Atheromatous changes are observed within the wall of the aorta without aneurysmal dilatation.    There is gaseous distention of colon loops. There is no bowel wall thickening or evidence of bowel obstruction. A gastric tube extends into the stomach via the anterior abdominal wall approach.    The urinary bladder is nondistended containing a Mathur catheter balloon. There has been an apparent previous hysterectomy. There are no adnexal masses.    There is mild diffuse body wall edema. Previously described mild heterotopic ossification about the hip joints is again noted. There appear to be small hip joint effusions. There is apparent small area of soft tissue ulceration adjacent to the lower sacrum/coccyx just to the left of midline.    IMPRESSION:    Mild bilateral hydronephrosis and hydroureter to the level of the midpelvis without defined etiology. The urinary bladder contains a Mathur catheter and is nondistended.    Persistence of patchy pulmonary airspace opacities, greater on the right. Correlate with evidence of multifocal pneumonia.    Ill-defined fluid collection within the gallbladder fossa similar to prior studies.    Mildly prominent right hilar lymph node possibly reactive. CT Follow-up recommended.    Multiple additional observations as above.    Electronically signed by:  Sil Tomas MD  12/11/2023 01:25 PM CST  Workstation: [id number]      ECHOCARDIOGRAM RESULTS (last 5)  Results for orders placed during the hospital encounter of 12/05/23    Echo    Interpretation Summary    Left Ventricle: The left ventricle is normal in size. Normal wall thickness. Normal wall motion. There is normal systolic function with a visually estimated ejection fraction of 60 - 65%. There is normal diastolic function.    Right Ventricle: Normal right ventricular cavity size. Wall thickness is normal. Right ventricle wall motion  is normal. Systolic function is normal.    IVC/SVC: Normal venous pressure at 3 mmHg.      Results for orders placed during the hospital encounter of 05/02/23    Transesophageal echo (GT) with possible cardioversion    Interpretation Summary  · Normal appearing left atrial appendage. No thrombus is present in the appendage. Normal appendage velocities.  · The left ventricle is normal in size with mild concentric hypertrophy and normal systolic function.  · Normal left ventricular diastolic function.  · The estimated ejection fraction is 60%.  · Grade 1 plaque present in the transverse aorta and descending aorta.  · Mild mitral regurgitation.  · Normal right ventricular size with normal right ventricular systolic function.  · NEGATIVE BUBBLE STUDY FOR R-L SHUNT      Results for orders placed during the hospital encounter of 04/26/23    Echo Saline Bubble? No    Interpretation Summary  · The left ventricle is normal in size with normal systolic function.  · Indeterminate left ventricular diastolic function.  · The estimated ejection fraction is 62%.  · Normal right ventricular size with normal right ventricular systolic function.  · Normal central venous pressure (3 mmHg).      Results for orders placed during the hospital encounter of 05/18/22    Echo Saline Bubble? No    Interpretation Summary  · There is no evidence of intracardiac shunting.  · The left ventricle is normal in size with normal systolic function.  · The  estimated ejection fraction is 65%.  · Indeterminate left ventricular diastolic function.  · Normal right ventricular size with normal right ventricular systolic function.  · No interatrial septal defect present.  · Mild aortic regurgitation.      Results for orders placed during the hospital encounter of 07/02/21    Echo    Interpretation Summary  · Normal central venous pressure (3 mmHg).  · The left ventricle is normal in size with mild concentric hypertrophy and normal systolic function.  · The estimated ejection fraction is 53%.  · Grade I left ventricular diastolic dysfunction.  · Normal right ventricular size with normal right ventricular systolic function.      CURRENT/PREVIOUS VISIT EKG  Results for orders placed or performed during the hospital encounter of 12/05/23   EKG 12-LEAD    Collection Time: 12/11/23  4:19 PM    Narrative    Test Reason : I48.91,I48.92,    Vent. Rate : 081 BPM     Atrial Rate : 081 BPM     P-R Int : 144 ms          QRS Dur : 068 ms      QT Int : 404 ms       P-R-T Axes : 051 026 054 degrees     QTc Int : 469 ms    Normal sinus rhythm  Normal ECG  When compared with ECG of 05-DEC-2023 19:35,  No significant change was found    Referred By: [email or web address]   SELF           Confirmed By:            ASSESSMENT/PLAN:     Active Hospital Problems    Diagnosis    *Severe sepsis    ESBL (extended spectrum beta-lactamase) producing bacteria infection    Acute respiratory failure with hypoxia    Bacteremia due to Proteus species    Bedridden    Blisters of multiple sites    Chronic indwelling Mathur catheter    Cognitive impairment    Hydronephrosis    Hypoxia    Pressure injury of sacral region, stage 3    Aspiration pneumonia of right lung due to vomit    Leucocytosis    UTI (urinary tract infection)    Hyperkalemia    Pressure injury of sacral region, unstageable    Aphasia    Cognitive communication deficit    Combined forms of age-related cataract, bilateral    COURTNEY (acute kidney injury)    CVA  (cerebral vascular accident)    Mixed hyperlipidemia    Hyperglycemia due to type 2 diabetes mellitus    Hypertension associated with diabetes       ASSESSMENT & PLAN:   1. Bacteremia due to Proteus species  2. Cognitive impairment  3. History of CVA  4. History of arterial hypertension  5. Profound anemia with microcytic and this has       RECOMMENDATIONS:  Although patient has significant bacteremia with Proteus species trans thoracic echo did not show any valvular involvement.  Given that  patient has significant anemia recommend further evaluation for the same, consider GI evaluation EGD  Will defer transesophageal echocardiography until GI is seen to rule out any esophageal trauma and minimize risk of complications  Will scheduled for transesophageal echocardiography once cleared by GI  Regardless patient needs continued intravenous antibiotic therapy    Thank you for the consultation    Antoine Rivera MD  Date of Service: 12/12/2023  11:41 AM

## 2023-12-12 NOTE — ANESTHESIA POSTPROCEDURE EVALUATION
Anesthesia Post Evaluation    Patient: Steff Johnson    Procedure(s) Performed: Procedure(s) (LRB):  Transesophageal echo (GT) intra-procedure log documentation (N/A)    Final Anesthesia Type: general      Patient location: [facility].  Patient participation: Yes- Able to Participate  Level of consciousness: awake and alert and oriented  Post-procedure vital signs: reviewed and stable  Pain management: adequate  Airway patency: patent    PONV status at discharge: No PONV  Anesthetic complications: no      Cardiovascular status: blood pressure returned to baseline, hemodynamically stable and stable  Respiratory status: unassisted, spontaneous ventilation and room air  Hydration status: euvolemic  Follow-up not needed.              Vitals Value Taken Time   /88 12/12/23 1400   Temp 36.7 12/12/23 1450   Pulse 86 12/12/23 1400   Resp 25 12/12/23 1400   SpO2 100 % 12/12/23 1400         No case tracking events are documented in the log.      Pain/Estefania Score: Pain Rating Prior to Med Admin: 0 (12/12/2023 11:00 AM)  Pain Rating Post Med Admin: 0 (12/12/2023 11:00 AM)

## 2023-12-12 NOTE — CARE UPDATE
12/12/23 0809   Patient Assessment/Suction   Level of Consciousness (AVPU) alert   Respiratory Effort Normal;Unlabored   Expansion/Accessory Muscles/Retractions no use of accessory muscles;no retractions;expansion symmetric   All Lung Fields Breath Sounds clear;diminished   Rhythm/Pattern, Respiratory unlabored;pattern regular;depth regular;chest wiggle adequate;no shortness of breath reported   Cough Frequency no cough   PRE-TX-O2   Device (Oxygen Therapy) room air   SpO2 97 %   Pulse Oximetry Type Intermittent   $ Pulse Oximetry - Multiple Charge Pulse Oximetry - Multiple   Pulse 79   Resp 19   Aerosol Therapy   $ Aerosol Therapy Charges Aerosol Treatment   Daily Review of Necessity (SVN) completed   Respiratory Treatment Status (SVN) given   Treatment Route (SVN) oxygen;mask   Patient Position (SVN) HOB elevated   Post Treatment Assessment (SVN) increased aeration   Signs of Intolerance (SVN) none   Breath Sounds Post-Respiratory Treatment   Throughout All Fields Post-Treatment All Fields   Throughout All Fields Post-Treatment aeration increased   Post-treatment Heart Rate (beats/min) 85   Post-treatment Resp Rate (breaths/min) 18   Education   $ Education Bronchodilator;15 min

## 2023-12-12 NOTE — ANESTHESIA PREPROCEDURE EVALUATION
12/12/2023  Steff Johnson is a 63 y.o., female.      Patient Active Problem List   Diagnosis    Hypertension associated with diabetes    COPD (chronic obstructive pulmonary disease)    Hyperglycemia due to type 2 diabetes mellitus    Proteinuria    Complete tear of left rotator cuff    Left shoulder pain    Shoulder stiffness, left    Shoulder weakness    Type 2 diabetes mellitus with diabetic nephropathy, HbA1c 8.9%    Mixed hyperlipidemia    Type 2 diabetes mellitus with both eyes affected by moderate nonproliferative retinopathy and macular edema, with long-term current use of insulin    CVA (cerebral vascular accident)    H/o Cranial nerve III palsy, left    Gait abnormality    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    Vitamin D deficiency    Acute right-sided weakness    Frequent falls    COVID-19 infection    Hypertensive emergency    COURTNEY (acute kidney injury)    Osteomyelitis of finger of left hand    Paronychia of finger, left    Ocular hypertension, bilateral    Combined forms of age-related cataract, bilateral    Hypertensive crisis    History of stroke    Cognitive communication deficit    Aphasia    Right sided weakness    At risk for falling    Impaired functional mobility, balance, gait, and endurance    Decreased strength    Decreased range of motion    Decreased independence with activities of daily living    Impaired functional mobility, balance, and endurance    Alteration in instrumental activities of daily living (IADL)    Dysarthria    Dysphonia    Severe sepsis    Thrombocytosis    Microcytic anemia    Pressure injury of sacral region, unstageable    UTI (urinary tract infection)    Hyperkalemia    Leucocytosis    Acute respiratory failure with hypoxia    Bacteremia due to Proteus species    Bedridden    Blisters of multiple sites    Chronic indwelling Mathur catheter    Cognitive  impairment    Hydronephrosis    Hypoxia    Pressure injury of sacral region, stage 3    Aspiration pneumonia of right lung due to vomit    ESBL (extended spectrum beta-lactamase) producing bacteria infection       Past Surgical History:   Procedure Laterality Date    COLONOSCOPY N/A 4/11/2017    Procedure: COLONOSCOPY;  Surgeon: Jared Antonio MD;  Location: [facility] ENDO;  Service: Endoscopy;  Laterality: N/A;    HYSTERECTOMY      OOPHORECTOMY      SHOULDER SURGERY      R    TRANSESOPHAGEAL ECHOCARDIOGRAM WITH POSSIBLE CARDIOVERSION (GT W/ POSS CARDIOVERSION) N/A 5/4/2023    Procedure: Transesophageal echo (GT) intra-procedure log documentation;  Surgeon: Blade Phillip MD;  Location: [facility] CATH/EP LAB;  Service: Cardiology;  Laterality: N/A;        Tobacco Use:  The patient  reports that she has never smoked. She has never used smokeless tobacco.     Results for orders placed or performed during the hospital encounter of 12/05/23   EKG 12-LEAD    Collection Time: 12/11/23  4:19 PM    Narrative    Test Reason : I48.91,I48.92,    Vent. Rate : 081 BPM     Atrial Rate : 081 BPM     P-R Int : 144 ms          QRS Dur : 068 ms      QT Int : 404 ms       P-R-T Axes : 051 026 054 degrees     QTc Int : 469 ms    Normal sinus rhythm  Normal ECG  When compared with ECG of 05-DEC-2023 19:35,  No significant change was found    Referred By: AAAREFERR   SELF           Confirmed By:         Imaging Results              US Retroperitoneal Complete (Final result)  Result time 12/06/23 02:15:33      Final result by Brooke Jaimes MD (12/06/23 02:15:33)                   Narrative:    EXAM:  US Retroperitoneal Complete    CLINICAL HISTORY:  The patient is 63 years old and is Female; COURTNEY    TECHNIQUE:  Real-time complete ultrasound of the retroperitoneum with image documentation.    COMPARISON:  Right upper quadrant ultrasound dated 10/24/2023, CT scan abdomen and pelvis dated 10/24/2023    FINDINGS:  LIMITATIONS:  Evaluation  limited due to patient condition and positioning.  RIGHT KIDNEY:  The right kidney measures 9.2 cm in length.  Probable mild right hydronephrosis.  No stones.  LEFT KIDNEY:  Unremarkable.  No stones.  No hydronephrosis.  The left kidney measures 9.2 cm in length.  BLADDER:  Mathur catheter in decompressed urinary bladder.    IMPRESSION:  Limited evaluation. Probable mild right hydronephrosis.    Electronically signed by:  Brooke Jaimes MD  12/06/2023 02:15 AM CST Workstation: [id number]                                     X-Ray Chest AP Portable (Final result)  Result time 12/06/23 07:10:32      Final result by Tammy Duncan MD (12/06/23 07:10:32)                   Narrative:    Portable chest x-ray at 7:38 PM is compared to prior study 10/24/2023    Clinical history is hematuria    The cardiomediastinal silhouette is normal in size. There are no confluent infiltrates or pleural effusions. There is linear atelectasis or scarring in the left midlung. There are mild degenerative changes of both shoulders.    There is a feeding tube overlying the stomach.    IMPRESSION: No acute pulmonary process    Electronically signed by:  Tammy Duncan MD  12/06/2023 07:10 AM CST Workstation: [id number]                                     Lab Results   Component Value Date    WBC 24.74 (H) 12/11/2023    HGB 6.8 (LL) 12/11/2023    HCT 20.5 (LL) 12/11/2023    MCV 76 (L) 12/11/2023     (H) 12/11/2023     BMP  Lab Results   Component Value Date     12/11/2023    K 4.2 12/11/2023     12/11/2023    CO2 25 12/11/2023    BUN 21 12/11/2023    CREATININE 0.6 12/11/2023    CALCIUM 8.3 (L) 12/11/2023    ANIONGAP 6 (L) 12/11/2023     (H) 12/11/2023     (H) 12/10/2023     12/09/2023       Results for orders placed during the hospital encounter of 12/05/23    Echo    Interpretation Summary    Left Ventricle: The left ventricle is normal in size. Normal wall thickness. Normal wall motion. There  is normal systolic function with a visually estimated ejection fraction of 60 - 65%. There is normal diastolic function.    Right Ventricle: Normal right ventricular cavity size. Wall thickness is normal. Right ventricle wall motion  is normal. Systolic function is normal.    IVC/SVC: Normal venous pressure at 3 mmHg.            Pre-op Assessment    I have reviewed the Patient Summary Reports.     I have reviewed the Nursing Notes. I have reviewed the NPO Status.   I have reviewed the Medications.     Review of Systems  Anesthesia Hx:  No problems with previous Anesthesia             Denies Family Hx of Anesthesia complications.    Denies Personal Hx of Anesthesia complications.                    Social:  Non-Smoker       Hematology/Oncology:    Oncology Normal    -- Anemia:                                  EENT/Dental:  EENT/Dental Normal           Cardiovascular:     Hypertension              ECG has been reviewed.                          Pulmonary:   COPD                     Renal/:  Chronic Renal Disease                Hepatic/GI:  Hepatic/GI Normal                 Musculoskeletal:  Arthritis               Neurological:   CVA, residual symptoms        Right side weakness                            Endocrine:  Diabetes           Psych:  Psychiatric Normal                    Physical Exam  General: Well nourished    Airway:  Mallampati: II   Mouth Opening: Normal  TM Distance: Normal  Tongue: Normal  Neck ROM: Normal ROM    Dental:  Intact    Chest/Lungs:  Clear to auscultation    Heart:  Rate: Normal  Rhythm: Regular Rhythm  Sounds: Normal        Anesthesia Plan  Type of Anesthesia, risks & benefits discussed:    Anesthesia Type: Gen Natural Airway  Intra-op Monitoring Plan: Standard ASA Monitors  Informed Consent: Informed consent signed with the Patient and all parties understand the risks and agree with anesthesia plan.  All questions answered.   ASA Score: 4  Anesthesia Plan Notes: POM    Ready For  Surgery From Anesthesia Perspective.     .

## 2023-12-12 NOTE — PROGRESS NOTES
Pharmacokinetic Assessment Follow Up: IV Vancomycin    Vancomycin serum concentration assessment(s):    The random level was drawn correctly and can be used to guide therapy at this time. The measurement is within the desired definitive target range of 15 to 20 mcg/mL.    Vancomycin Regimen Plan:    Change regimen to Vancomycin 1000 mg IV every 24 hours with next serum trough concentration measured at 1700 prior to the dose on 12/13/23    Drug levels (last 3 results):  Recent Labs   Lab Result Units 12/11/23  0629 12/11/23  1514   Vancomycin, Random ug/mL  --  18.2   Vancomycin-Trough ug/mL 26.0*  --        Pharmacy will continue to follow and monitor vancomycin.    Please contact pharmacy at extension [phone] for questions regarding this assessment.    Thank you for the consult,   Jackelyn Betancourt       Patient brief summary:  Steff Johnson is a 63 y.o. female initiated on antimicrobial therapy with IV Vancomycin for treatment of bacteremia    Drug Allergies:   Review of patient's allergies indicates:  No Known Allergies    Actual Body Weight:   61.8 kg    Renal Function:   Estimated Creatinine Clearance: 79.7 mL/min (based on SCr of 0.6 mg/dL).,     Dialysis Method (if applicable):  N/A    CBC (last 72 hours):  Recent Labs   Lab Result Units 12/09/23  0547 12/10/23  0626 12/11/23  0629   WBC K/uL 20.79* 24.88* 24.74*   Hemoglobin g/dL 7.2* 7.3* 6.8*   Hematocrit % 21.9* 21.9* 20.5*   Platelets K/uL 495* 383 582*   Gran % % 72.0 76.0* 73.6*   Lymph % % 12.0* 14.0* 11.0*   Mono % % 10.0 5.0 7.6   Eosinophil % % 2.0 1.0 3.6   Basophil % % 0.0 0.0 0.4   Differential Method  Manual Manual Automated       Metabolic Panel (last 72 hours):  Recent Labs   Lab Result Units 12/09/23  0547 12/10/23  0626 12/11/23  0629 12/11/23  0938   Sodium mmol/L 143 140 136  --    Potassium mmol/L 4.0 4.3 4.2  --    Chloride mmol/L 109 108 105  --    CO2 mmol/L 28 26 25  --    Glucose mg/dL 110 140* 123*  --    Glucose, UA   --   --   --   Negative   BUN mg/dL 25* 24* 21  --    Creatinine mg/dL 0.6 0.6 0.6  --    Magnesium mg/dL 1.8 2.0 2.0  --    Phosphorus mg/dL 2.5* 2.6* 2.7  --        Vancomycin Administrations:  vancomycin given in the last 96 hours                     vancomycin in dextrose 5 % 1 gram/250 mL IVPB 1,000 mg (mg) 1,000 mg New Bag 12/11/23 1809    vancomycin in dextrose 5 % 1 gram/250 mL IVPB 1,000 mg (mg) 1,000 mg New Bag 12/10/23 1751     1,000 mg New Bag  0538     1,000 mg New Bag 12/09/23 1632                    Microbiologic Results:  Microbiology Results (last 7 days)       Procedure Component Value Units Date/Time    Blood culture [[id number]] Collected: 12/11/23 0937    Order Status: Completed Specimen: Blood Updated: 12/11/23 1717     Blood Culture, Routine No Growth to date    Blood culture [[id number]] Collected: 12/11/23 0628    Order Status: Completed Specimen: Blood Updated: 12/11/23 1517     Blood Culture, Routine No Growth to date    Urine culture [[id number]] Collected: 12/11/23 0938    Order Status: No result Specimen: Urine Updated: 12/11/23 1102    Blood culture [[id number]] Collected: 12/10/23 0626    Order Status: Completed Specimen: Blood Updated: 12/11/23 0832     Blood Culture, Routine No Growth to date      No Growth to date    Blood culture [[id number]]  (Abnormal)  (Susceptibility) Collected: 12/08/23 1208    Order Status: Completed Specimen: Blood Updated: 12/11/23 0639     Blood Culture, Routine Gram stain aer bottle: Gram positive cocci      Results called to and read back by: Priscilla Boyce RN on [address] [place] [address],      12/09/2023 14:53 vcb      ENTEROCOCCUS FAECALIS    Rapid Organism ID by PCR (from Blood culture) [[id number]]  (Abnormal) Collected: 12/08/23 1208    Order Status: Completed Updated: 12/10/23 1853     Enterococcus faecalis Not Detected     Enterococcus faecium Not Detected     Listeria monocytogenes Not Detected     Staphylococcus spp. Not Detected     Staphylococcus aureus Not Detected      Staphylococcus epidermidis Not Detected     Staphylococcus lugdunensis Not Detected     Streptococcus species Not Detected     Streptococcus agalactiae Not Detected     Streptococcus pneumoniae Not Detected     Streptococcus pyogenes Not Detected     Acinetobacter calcoaceticus/baumannii complex Not Detected     Bacteroides fragilis Not Detected     Enterobacterales Not Detected     Enterobacter cloacae complex Not Detected     Escherichia coli Not Detected     Klebsiella aerogenes Not Detected     Klebsiella oxytoca Not Detected     Klebsiella pneumoniae group Not Detected     Proteus Not Detected     Salmonella sp Not Detected     Serratia marcescens Not Detected     Haemophilus influenzae Not Detected     Neisseria meningtidis Not Detected     Pseudomonas aeruginosa Not Detected     Stenotrophomonas maltophilia Not Detected     Candida albicans Not Detected     Candida auris Not Detected     Juli glabrata Detected     Juli krusei Not Detected     Candida parapsilosis Not Detected     Candida tropicalis Not Detected     Cryptococcus neoformans/gattii Not Detected     CTX-M (ESBL ) Test not applicable     IMP (Carbapenem resistant) Test not applicable     KPC resistance gene (Carbapenem resistant) Test not applicable     mcr-1  Test not applicable     mec A/C  Test not applicable     mec A/C and MREJ (MRSA) gene Test not applicable     NDM (Carbapenem resistant) Test not applicable     OXA-48-like (Carbapenem resistant) Test not applicable     van A/B (VRE gene) Test not applicable     VIM (Carbapenem resistant) Test not applicable    Blood culture [[id number]] Collected: 12/08/23 1208    Order Status: Completed Specimen: Blood Updated: 12/10/23 1729     Blood Culture, Routine Gram stain aer bottle: budding yeast      Results called to and read back by: Mary Braun LPN on [address] [place] [address],      12/10/2023 17:29    Blood culture [[id number]]  (Abnormal) Collected: 12/06/23 1521    Order Status: Completed  Specimen: Blood from Peripheral, Right Arm Updated: 12/10/23 0745     Blood Culture, Routine Gram stain aer bottle: Gram negative rods      Positive results previously called 12/09/2023  00:48 KS3      PROTEUS MIRABILIS ESBL  For susceptibility see order #[id number]  Known ESBL patient      Narrative:      Collection has been rescheduled by [email or web address] at 12/06/2023 13:17 Reason:   Duplicate order waiting for dr lott to cancel. Spoke to rob RN.  Collection has been rescheduled by 10 at 12/06/2023 13:17 Reason:   Duplicate order waiting for dr lott to cancel. Spoke to rob GARCIA.    Rapid Organism ID by PCR (from Blood culture) [[id number]]  (Abnormal) Collected: 12/08/23 1208    Order Status: Completed Updated: 12/09/23 1551     Enterococcus faecalis Detected     Enterococcus faecium Not Detected     Listeria monocytogenes Not Detected     Staphylococcus spp. Not Detected     Staphylococcus aureus Not Detected     Staphylococcus epidermidis Not Detected     Staphylococcus lugdunensis Not Detected     Streptococcus species Not Detected     Streptococcus agalactiae Not Detected     Streptococcus pneumoniae Not Detected     Streptococcus pyogenes Not Detected     Acinetobacter calcoaceticus/baumannii complex Not Detected     Bacteroides fragilis Not Detected     Enterobacterales Not Detected     Enterobacter cloacae complex Not Detected     Escherichia coli Not Detected     Klebsiella aerogenes Not Detected     Klebsiella oxytoca Not Detected     Klebsiella pneumoniae group Not Detected     Proteus Not Detected     Salmonella sp Not Detected     Serratia marcescens Not Detected     Haemophilus influenzae Not Detected     Neisseria meningtidis Not Detected     Pseudomonas aeruginosa Not Detected     Stenotrophomonas maltophilia Not Detected     Candida albicans Not Detected     Candida auris Not Detected     Candida glabrata Not Detected     Candida krusei Not Detected     Candida parapsilosis Not Detected     Candida  tropicalis Not Detected     Cryptococcus neoformans/gattii Not Detected     CTX-M (ESBL ) Test not applicable     IMP (Carbapenem resistant) Test not applicable     KPC resistance gene (Carbapenem resistant) Test not applicable     mcr-1  Test not applicable     mec A/C  Test not applicable     mec A/C and MREJ (MRSA) gene Test not applicable     NDM (Carbapenem resistant) Test not applicable     OXA-48-like (Carbapenem resistant) Test not applicable     van A/B (VRE gene) Not Detected     VIM (Carbapenem resistant) Test not applicable    Urine culture [[id number]]  (Abnormal)  (Susceptibility) Collected: 12/05/23 2003    Order Status: Completed Specimen: Urine Updated: 12/08/23 0757     Urine Culture, Routine PROTEUS MIRABILIS ESBL  >100,000 cfu/ml  Known ESBL patient      Narrative:      Specimen Source->Urine    Blood culture [[id number]]  (Abnormal) Collected: 12/06/23 1522    Order Status: Completed Specimen: Blood from Antecubital, Right Hand Updated: 12/08/23 0738     Blood Culture, Routine Gram stain aer bottle: Gram negative rods      Positive results previously called 12/07/2023  18:19 KS3      PROTEUS MIRABILIS ESBL  Known ESBL patient  For susceptibility see order #[id number]      Narrative:      Collection has been rescheduled by [facility] at 12/06/2023 13:17 Reason:   Duplicate order waiting for dr lott to cancel. Spoke to rob GARCIA.  Collection has been rescheduled by [facility] at 12/06/2023 13:17 Reason:   Duplicate order waiting for dr lott to cancel. Spoke to rob GARCIA.    Blood culture x two cultures. Draw prior to antibiotics. [[id number]]  (Abnormal) Collected: 12/05/23 2017    Order Status: Completed Specimen: Blood Updated: 12/08/23 0736     Blood Culture, Routine Gram stain lisa bottle: Gram negative rods      Gram stain aer bottle: Gram negative rods      Results called to and read back by:Rob Dugan RN  12/06/2023      10:39 JBM      Positive results previously called      PROTEUS MIRABILIS  ESBL  Known ESBL patient  For susceptibility see order #[id number]      Narrative:      Aerobic and anaerobic  Collection has been rescheduled by [email or web address] at 12/05/2023 19:29 Reason:   Meline,ER tech said to come back in a minute. Patient needs to be   cleaned.  Collection has been rescheduled by [email or web address] at 12/05/2023 20:20 Reason:   Done  Collection has been rescheduled by [email or web address] at 12/05/2023 19:29 Reason:   Meline,ER tech said to come back in a minute. Patient needs to be   cleaned.  Collection has been rescheduled by [email or web address] at 12/05/2023 20:20 Reason:   Done    Blood culture x two cultures. Draw prior to antibiotics. [[id number]]  (Abnormal)  (Susceptibility) Collected: 12/05/23 2007    Order Status: Completed Specimen: Blood Updated: 12/08/23 0735     Blood Culture, Routine Gram stain lisa bottle: Gram negative rods      Gram stain aer bottle: Gram negative rods      Results called to and read back by:Natalie Dugan RN  12/06/2023      10:39 JBM      Positive results previously called      PROTEUS MIRABILIS ESBL  Known ESBL patient      Narrative:      Aerobic and anaerobic  Collection has been rescheduled by JS at 12/05/2023 19:29 Reason:   Meline,ER tech said to come back in a minute. Patient needs to be   cleaned.  Collection has been rescheduled by [email or web address] at 12/05/2023 20:20 Reason:   Done  Collection has been rescheduled by JS at 12/05/2023 19:29 Reason:   Meline,ER tech said to come back in a minute. Patient needs to be   cleaned.  Collection has been rescheduled by [email or web address] at 12/05/2023 20:20 Reason:   Done    MRSA Screen by PCR [[id number]] Collected: 12/06/23 1645    Order Status: Completed Specimen: Nasopharyngeal Swab from Nasal Updated: 12/06/23 2239     MRSA SCREEN BY PCR Negative    Respiratory Infection Panel (PCR), Nasopharyngeal [[id number]] Collected: 12/06/23 1645    Order Status: Completed Specimen: Nasopharyngeal Swab Updated: 12/06/23 1905     Respiratory Infection Panel Source NP swab      Adenovirus Not Detected     Coronavirus 229E, Common Cold Virus Not Detected     Coronavirus HKU1, Common Cold Virus Not Detected     Coronavirus NL63, Common Cold Virus Not Detected     Coronavirus OC43, Common Cold Virus Not Detected     Comment: Coronavirus strains 229E, HKU1, NL63, and OC43 can cause the common   cold   and are not associated with the respiratory disease outbreak caused   by  the COVID-19 (SARS-CoV-2 novel Coronavirus) strain.           SARS-CoV2 (COVID-19) Qualitative PCR Not Detected     Human Metapneumovirus Not Detected     Human Rhinovirus/Enterovirus Not Detected     Influenza A (subtypes H1, H1-2009,H3) Not Detected     Influenza B Not Detected     Parainfluenza Virus 1 Not Detected     Parainfluenza Virus 2 Not Detected     Parainfluenza Virus 3 Not Detected     Parainfluenza Virus 4 Not Detected     Respiratory Syncytial Virus Not Detected     Bordetella Parapertussis (TG7601) Not Detected     Bordetella pertussis (ptxP) Not Detected     Chlamydia pneumoniae Not Detected     Mycoplasma pneumoniae Not Detected     Comment: Respiratory Infection Panel testing performed by Multiplex PCR.       Narrative:      Respiratory Infection Panel source->NP Swab    Culture, Respiratory with Gram Stain [[id number]]     Order Status: Sent Specimen: Sputum, Induced     Rapid Organism ID by PCR (from Blood culture) [[id number]]  (Abnormal) Collected: 12/05/23 2007    Order Status: Completed Updated: 12/06/23 1151     Enterococcus faecalis Not Detected     Enterococcus faecium Not Detected     Listeria monocytogenes Not Detected     Staphylococcus spp. Not Detected     Staphylococcus aureus Not Detected     Staphylococcus epidermidis Not Detected     Staphylococcus lugdunensis Not Detected     Streptococcus species Not Detected     Streptococcus agalactiae Not Detected     Streptococcus pneumoniae Not Detected     Streptococcus pyogenes Not Detected     Acinetobacter calcoaceticus/baumannii complex Not  Detected     Bacteroides fragilis Not Detected     Enterobacterales See species for ID     Comment: critical result(s) called and verbal readback obtained from Nada   Servando RN-ED by CD3 12/06/2023 11:51          Enterobacter cloacae complex Not Detected     Escherichia coli Not Detected     Klebsiella aerogenes Not Detected     Klebsiella oxytoca Not Detected     Klebsiella pneumoniae group Not Detected     Proteus Detected     Comment: critical result(s) called and verbal readback obtained from Nada   Servando RN-ED by CD3 12/06/2023 11:51          Salmonella sp Not Detected     Serratia marcescens Not Detected     Haemophilus influenzae Not Detected     Neisseria meningtidis Not Detected     Pseudomonas aeruginosa Not Detected     Stenotrophomonas maltophilia Not Detected     Candida albicans Not Detected     Candida auris Not Detected     Candida glabrata Not Detected     Candida krusei Not Detected     Candida parapsilosis Not Detected     Candida tropicalis Not Detected     Cryptococcus neoformans/gattii Not Detected     CTX-M (ESBL ) Detected     Comment: critical result(s) called and verbal readback obtained from Nada   Servando RN-ED by CD3 12/06/2023 11:51          IMP (Carbapenem resistant) Not Detected     KPC resistance gene (Carbapenem resistant) Not Detected     mcr-1  Test not applicable     mec A/C  Test not applicable     mec A/C and MREJ (MRSA) gene Test not applicable     NDM (Carbapenem resistant) Not Detected     OXA-48-like (Carbapenem resistant) Not Detected     van A/B (VRE gene) Test not applicable     VIM (Carbapenem resistant) Not Detected    Narrative:      Aerobic and anaerobic     critical result(s) called and verbal readback obtained from Nadhector Al RN-ED by CD3 12/06/2023 11:51

## 2023-12-13 LAB
ALBUMIN SERPL BCP-MCNC: 3.1 G/DL (ref 3.5–5.2)
ALP SERPL-CCNC: 199 U/L (ref 55–135)
ALT SERPL W/O P-5'-P-CCNC: 60 U/L (ref 10–44)
ANION GAP SERPL CALC-SCNC: 11 MMOL/L (ref 8–16)
AST SERPL-CCNC: 38 U/L (ref 10–40)
BILIRUB SERPL-MCNC: 0.6 MG/DL (ref 0.1–1)
BUN SERPL-MCNC: 12 MG/DL (ref 8–23)
CALCIUM SERPL-MCNC: 8.5 MG/DL (ref 8.7–10.5)
CHLORIDE SERPL-SCNC: 106 MMOL/L (ref 95–110)
CO2 SERPL-SCNC: 19 MMOL/L (ref 23–29)
CREAT SERPL-MCNC: 0.5 MG/DL (ref 0.5–1.4)
EST. GFR  (NO RACE VARIABLE): >60 ML/MIN/1.73 M^2
GLUCOSE SERPL-MCNC: 102 MG/DL (ref 70–110)
GLUCOSE SERPL-MCNC: 109 MG/DL (ref 70–110)
GLUCOSE SERPL-MCNC: 53 MG/DL (ref 70–110)
GLUCOSE SERPL-MCNC: 72 MG/DL (ref 70–110)
GLUCOSE SERPL-MCNC: 73 MG/DL (ref 70–110)
GLUCOSE SERPL-MCNC: 75 MG/DL (ref 70–110)
GLUCOSE SERPL-MCNC: 96 MG/DL (ref 70–110)
MAGNESIUM SERPL-MCNC: 1.9 MG/DL (ref 1.6–2.6)
POTASSIUM SERPL-SCNC: 4.9 MMOL/L (ref 3.5–5.1)
PROT SERPL-MCNC: 6.8 G/DL (ref 6–8.4)
SODIUM SERPL-SCNC: 136 MMOL/L (ref 136–145)
VANCOMYCIN TROUGH SERPL-MCNC: 16.7 UG/ML

## 2023-12-13 PROCEDURE — 80202 ASSAY OF VANCOMYCIN: CPT | Performed by: STUDENT IN AN ORGANIZED HEALTH CARE EDUCATION/TRAINING PROGRAM

## 2023-12-13 PROCEDURE — 99233 SBSQ HOSP IP/OBS HIGH 50: CPT | Mod: FS,,, | Performed by: NURSE PRACTITIONER

## 2023-12-13 PROCEDURE — 36415 COLL VENOUS BLD VENIPUNCTURE: CPT | Performed by: STUDENT IN AN ORGANIZED HEALTH CARE EDUCATION/TRAINING PROGRAM

## 2023-12-13 PROCEDURE — 63600175 PHARM REV CODE 636 W HCPCS: Performed by: NURSE PRACTITIONER

## 2023-12-13 PROCEDURE — 25000242 PHARM REV CODE 250 ALT 637 W/ HCPCS: Performed by: STUDENT IN AN ORGANIZED HEALTH CARE EDUCATION/TRAINING PROGRAM

## 2023-12-13 PROCEDURE — 99900035 HC TECH TIME PER 15 MIN (STAT)

## 2023-12-13 PROCEDURE — 94640 AIRWAY INHALATION TREATMENT: CPT

## 2023-12-13 PROCEDURE — 25000003 PHARM REV CODE 250: Performed by: INTERNAL MEDICINE

## 2023-12-13 PROCEDURE — 63600175 PHARM REV CODE 636 W HCPCS: Performed by: INTERNAL MEDICINE

## 2023-12-13 PROCEDURE — 25000003 PHARM REV CODE 250: Performed by: STUDENT IN AN ORGANIZED HEALTH CARE EDUCATION/TRAINING PROGRAM

## 2023-12-13 PROCEDURE — 12000002 HC ACUTE/MED SURGE SEMI-PRIVATE ROOM

## 2023-12-13 PROCEDURE — 94761 N-INVAS EAR/PLS OXIMETRY MLT: CPT

## 2023-12-13 PROCEDURE — 25000003 PHARM REV CODE 250: Performed by: NURSE PRACTITIONER

## 2023-12-13 PROCEDURE — 83735 ASSAY OF MAGNESIUM: CPT | Performed by: STUDENT IN AN ORGANIZED HEALTH CARE EDUCATION/TRAINING PROGRAM

## 2023-12-13 PROCEDURE — 99900031 HC PATIENT EDUCATION (STAT)

## 2023-12-13 PROCEDURE — 80053 COMPREHEN METABOLIC PANEL: CPT | Performed by: NURSE PRACTITIONER

## 2023-12-13 RX ORDER — IPRATROPIUM BROMIDE 0.5 MG/2.5ML
0.5 SOLUTION RESPIRATORY (INHALATION)
Status: DISCONTINUED | OUTPATIENT
Start: 2023-12-14 | End: 2023-12-19 | Stop reason: HOSPADM

## 2023-12-13 RX ADMIN — MEROPENEM 1 G: 1 INJECTION, POWDER, FOR SOLUTION INTRAVENOUS at 12:12

## 2023-12-13 RX ADMIN — MICAFUNGIN SODIUM 100 MG: 100 INJECTION, POWDER, LYOPHILIZED, FOR SOLUTION INTRAVENOUS at 10:12

## 2023-12-13 RX ADMIN — ATORVASTATIN CALCIUM 80 MG: 40 TABLET, FILM COATED ORAL at 09:12

## 2023-12-13 RX ADMIN — MEROPENEM 1 G: 1 INJECTION, POWDER, FOR SOLUTION INTRAVENOUS at 09:12

## 2023-12-13 RX ADMIN — ACETAMINOPHEN 650 MG: 650 SOLUTION ORAL at 09:12

## 2023-12-13 RX ADMIN — IPRATROPIUM BROMIDE 0.5 MG: 0.5 SOLUTION RESPIRATORY (INHALATION) at 07:12

## 2023-12-13 RX ADMIN — AMLODIPINE BESYLATE 10 MG: 5 TABLET ORAL at 08:12

## 2023-12-13 RX ADMIN — IPRATROPIUM BROMIDE 0.5 MG: 0.5 SOLUTION RESPIRATORY (INHALATION) at 08:12

## 2023-12-13 RX ADMIN — POLYETHYLENE GLYCOL 3350 17 G: 17 POWDER, FOR SOLUTION ORAL at 09:12

## 2023-12-13 RX ADMIN — APIXABAN 2.5 MG: 2.5 TABLET, FILM COATED ORAL at 09:12

## 2023-12-13 RX ADMIN — METOPROLOL TARTRATE 25 MG: 25 TABLET, FILM COATED ORAL at 09:12

## 2023-12-13 RX ADMIN — METOPROLOL TARTRATE 25 MG: 25 TABLET, FILM COATED ORAL at 08:12

## 2023-12-13 RX ADMIN — TAMSULOSIN HYDROCHLORIDE 0.4 MG: 0.4 CAPSULE ORAL at 09:12

## 2023-12-13 RX ADMIN — INSULIN DETEMIR 35 UNITS: 100 INJECTION, SOLUTION SUBCUTANEOUS at 09:12

## 2023-12-13 RX ADMIN — MEROPENEM 1 G: 1 INJECTION, POWDER, FOR SOLUTION INTRAVENOUS at 04:12

## 2023-12-13 RX ADMIN — LEVETIRACETAM 500 MG: 100 SOLUTION ORAL at 09:12

## 2023-12-13 RX ADMIN — DEXTROSE MONOHYDRATE 12.5 G: 25 INJECTION, SOLUTION INTRAVENOUS at 04:12

## 2023-12-13 RX ADMIN — FAMOTIDINE 20 MG: 20 TABLET ORAL at 08:12

## 2023-12-13 NOTE — PLAN OF CARE
Problem: Adult Inpatient Plan of Care  Goal: Plan of Care Review  Outcome: Ongoing, Progressing  Goal: Patient-Specific Goal (Individualized)  Outcome: Ongoing, Progressing     Problem: Diabetes Comorbidity  Goal: Blood Glucose Level Within Targeted Range  Outcome: Ongoing, Progressing  Intervention: Monitor and Manage Glycemia  Flowsheets (Taken 12/13/2023 0255)  Glycemic Management: blood glucose monitored

## 2023-12-13 NOTE — PROGRESS NOTES
"[facility]  Adult Nutrition   Progress Note (Follow-Up)    SUMMARY     Recommendations  Recommendation/Intervention: 1. When appropriate, re-start tube feeding to goal rate of 40 mL/hr. 2. RD to monitor for tolerance, labs, and status change.  Goals: (P) 1. Pt to achieve >75% EEN.EPN  Nutrition Goal Status: progressing towards goal  Communication of RD Recs: (P) reviewed with RN    Dietitian Rounds Brief  Patient s/p GT yesterday; cystoscopy with stents planned for today postponed. PEG remains clamped.  RD to follow    Diet order:   Current Diet Order: NPO                 Evaluation of Received Nutrient/Fluid Intake  Energy Calories Required: not meeting needs  Protein Required: not meeting needs  Tolerance: tolerating     % Intake of Estimated Energy Needs: 0 - 25 %  % Meal Intake: NPO      Intake/Output Summary (Last 24 hours) at 12/13/2023 1414  Last data filed at 12/13/2023 0906  Gross per 24 hour   Intake 450 ml   Output 3500 ml   Net -3050 ml        Anthropometrics  Temp: 99.1 °F (37.3 °C)  Height Method: Estimated  Height: 4' 11" (149.9 cm)  Height (inches): 59 in  Weight Method: Bed Scale  Weight: 61.8 kg (136 lb 4.3 oz)  Weight (lb): 136.27 lb  Ideal Body Weight (IBW), Female: 95 lb  % Ideal Body Weight, Female (lb): 130.42 %  BMI (Calculated): 27.5       Estimated/Assessed Needs  Weight Used For Calorie Calculations: (P) 56.2 kg (123 lb 14.4 oz)  Energy Calorie Requirements (kcal): (P) 1124 kcal - 1405 kcal (20-25 kcal/kg)  Energy Need Method: (P) Kcal/kg  Protein Requirements: (P) 43-51g/kg (1.2-1.5g/kg IBW)  Weight Used For Protein Calculations: (P) 43.1 kg (95 lb)  Fluid Requirements (mL): (P) 1405     RDA Method (mL): (P) 1124       Reason for Assessment  Reason For Assessment: RD follow-up  Diagnosis: (P) infection/sepsis  Relevant Medical History: (P) CVA, Aphasia, HTN associated w/ diabetes, hyperglycemia d/t T2DM, pressure injury, UTI, hyperkalemia, COPD, CKD III  Interdisciplinary " Rounds: did not attend    Nutrition/Diet History       Nutrition Risk Screen  Nutrition Risk Screen: tube feeding or parenteral nutrition, large or nonhealing wound, burn or pressure injury     MST Score: 2  Have you recently lost weight without trying?: Unsure  Weight loss score: 2  Have you been eating poorly because of a decreased appetite?: No  Appetite score: 0       Weight History:  Wt Readings from Last 5 Encounters:   12/06/23 61.8 kg (136 lb 4.3 oz)   12/12/23 61.7 kg (136 lb)   12/11/23 61.8 kg (136 lb 3.9 oz)   11/21/23 56.2 kg (124 lb)   10/24/23 56.5 kg (124 lb 9 oz)        Lab/Procedures/Meds: Pertinent Labs/Meds Reviewed    Medications:Pertinent Medications Reviewed  Scheduled Meds:   amLODIPine  10 mg Oral Daily    apixaban  2.5 mg Per G Tube BID    aspirin  81 mg Per G Tube Daily    atorvastatin  80 mg Per G Tube QHS    famotidine  20 mg Oral Daily    insulin detemir U-100  35 Units Subcutaneous Daily    ipratropium  0.5 mg Nebulization Q6H    levetiracetam  500 mg Per G Tube BID    meropenem (MERREM) IVPB  1 g Intravenous Q8H    metoprolol tartrate  25 mg Per G Tube BID    micafungin (MYCAMINE) IVPB  100 mg Intravenous Q24H    polyethylene glycol  17 g Per G Tube Daily    tamsulosin  0.4 mg Oral Daily    vancomycin (VANCOCIN) IV (PEDS and ADULTS)  1,000 mg Intravenous Q24H     Continuous Infusions:  PRN Meds:.0.9%  NaCl infusion (for blood administration), acetaminophen, albuterol-ipratropium, dextrose 50%, dextrose 50%, glucagon (human recombinant), glucose, glucose, hydrALAZINE, insulin aspart U-100, magnesium oxide, magnesium oxide, ondansetron, potassium bicarbonate, potassium bicarbonate, potassium bicarbonate, potassium, sodium phosphates, potassium, sodium phosphates, potassium, sodium phosphates, sodium chloride 0.9%, Pharmacy to dose Vancomycin consult **AND** vancomycin - pharmacy to dose    Labs: Pertinent Labs Reviewed  Clinical Chemistry:  Recent Labs   Lab 12/09/23  3800  "12/10/23  0626 12/11/23  0629 12/11/23  0629 12/12/23  0855 12/13/23  0606    140 136  --  136 136   K 4.0 4.3 4.2  --  3.9 4.9    108 105  --  106 106   CO2 28 26 25  --  23 19*    140* 123*  --  120* 72   BUN 25* 24* 21  --  15 12   CREATININE 0.6 0.6 0.6  --  0.5 0.5   CALCIUM 8.9 8.5* 8.3*  --  8.6* 8.5*   PROT  --   --   --    < > 6.8 6.8   ALBUMIN  --   --   --    < > 2.8* 3.1*   BILITOT  --   --   --   --  0.6 0.6   ALKPHOS  --   --   --   --  201* 199*   AST  --   --   --   --  46* 38   ALT  --   --   --   --  85* 60*   ANIONGAP 6* 6* 6*  --  7* 11   MG 1.8 2.0 2.0  --  1.9 1.9   PHOS 2.5* 2.6* 2.7  --   --   --     < > = values in this interval not displayed.     CBC:   Recent Labs   Lab 12/12/23  0855   WBC 21.62*   RBC 2.93*   HGB 7.4*   HCT 22.1*   *   MCV 75*   MCH 25.3*   MCHC 33.5     Lipid Panel:  No results for input(s): "CHOL", "HDL", "LDLCALC", "TRIG", "CHOLHDL" in the last 168 hours.  Cardiac Profile:  No results for input(s): "BNP", "CPK", "CPKMB", "TROPONINI", "CKTOTAL" in the last 168 hours.  Inflammatory Labs:  Recent Labs   Lab 12/09/23  0547 12/11/23  0937   .3* 55.1*     Diabetes:  No results for input(s): "HGBA1C", "POCTGLUCOSE" in the last 168 hours.  Thyroid & Parathyroid:  No results for input(s): "TSH", "FREET4", "H3EKPZR", "H9YQGAD", "THYROIDAB" in the last 168 hours.    Monitor and Evaluation  Food and Nutrient Intake: (P) energy intake, food and beverage intake, enteral nutrition intake  Food and Nutrient Adminstration: (P) diet order, enteral and parenteral nutrition administration  Knowledge/Beliefs/Attitudes: (P) beliefs and attitudes  Physical Activity and Function: (P) nutrition-related ADLs and IADLs  Anthropometric Measurements: (P) weight, weight change, body mass index  Biochemical Data, Medical Tests and Procedures: (P) electrolyte and renal panel, gastrointestinal profile, glucose/endocrine profile, inflammatory profile, lipid profile "     Nutrition Risk  Level of Risk/Frequency of Follow-up: high     Nutrition Follow-Up  RD Follow-up?: Yes      Jaelyn Mireles RD, LDN 12/13/2023 2:14 PM

## 2023-12-13 NOTE — CARE UPDATE
12/13/23 0820   Patient Assessment/Suction   Level of Consciousness (AVPU) alert   Respiratory Effort Normal;Unlabored   Expansion/Accessory Muscles/Retractions no use of accessory muscles;no retractions;expansion symmetric   All Lung Fields Breath Sounds clear;diminished   Rhythm/Pattern, Respiratory unlabored   Cough Frequency no cough   PRE-TX-O2   Device (Oxygen Therapy) room air   SpO2 96 %   Pulse Oximetry Type Intermittent   $ Pulse Oximetry - Multiple Charge Pulse Oximetry - Multiple   Pulse 91   Resp 17   Aerosol Therapy   $ Aerosol Therapy Charges Aerosol Treatment   Daily Review of Necessity (SVN) completed   Respiratory Treatment Status (SVN) given   Treatment Route (SVN) mask;oxygen   Patient Position (SVN) HOB elevated   Post Treatment Assessment (SVN) breath sounds unchanged   Signs of Intolerance (SVN) none   Breath Sounds Post-Respiratory Treatment   Throughout All Fields Post-Treatment All Fields   Throughout All Fields Post-Treatment no change   Post-treatment Heart Rate (beats/min) 90   Post-treatment Resp Rate (breaths/min) 17   Education   $ Education Bronchodilator;15 min

## 2023-12-13 NOTE — PROGRESS NOTES
Unfortunately our operating room with severely under staffed today    This procedure will have to be canceled for today  Repeat renal ultrasound to assess for persistent hydronephrosis    Will make plans to try to reschedule for tomorrow

## 2023-12-13 NOTE — PROGRESS NOTES
[facility]  Department of Infectious Disease  Progress Note        PATIENT NAME: Steff Johnson  MRN: [id number]  TODAY'S DATE: 12/13/2023  ADMIT DATE: 12/5/2023  LENGTH OF STAY: 8 DAYS    PRINCIPLE PROBLEM: Severe sepsis    REASON FOR CONSULT:  Gram negative sepsis     INTERVAL HISTORY     12/13@1118 (Danette):  Patient seen and examined in her room.  She was awake and alert and much more interactive today than she has been.  She actually said a few words and 1 or 2 questions appropriately.  She then becomes silent so it is difficult to get review of systems that have her.  T-max 100.2° in the last 24 hours.  WBC 21.62, platelets 766, BUN/CR 12/0.5, H&H 7.4/22.1.  BUN/CR 12/0.5, ALT/AST 60/38.  CRP is decreased to 55.1.  Last vanc trough 26.  Blood cultures x3 on 12/11 and 12/12 with no growth to date.  Last positive blood cultures on 12/08 with Candida glabrata and Enterococcus faecalis.  GT negative for any vegetation.    12/12@1114 (Danette):  Patient seen and examined alone in her room.  Condition is unchanged.  She is supposed to have a GT today and a blood transfusion.  IR declined any kind of intervention with fluid collection around gallbladder because it has been stable since 2016 and they did not feel like it was an infectious fluid collection.  T-max 99° in the last 24 hours.  WBC 21.62, neutrophils 75.4%, platelets 766, no bands, H&H 7.4/22.1, BUN/CR 15/0.5, CRP and procalcitonin trending down with platelets trending up.  Hepatic function panel is pending.  12/12 and 12/11 blood cultures are pending.  12/10 blood cultures x1 set no growth to date.  12/8 blood cultures with 1 bottle of 1 set with Candida glabrata and 1 bottle of 1 set with Enterococcus faecalis.  12/6 blood cultures with 4 4 bottles positive for Proteus mirabilis ESBL.  12/11 urine culture is pending with no growth to date.  She continues on vancomycin, micafungin, and meropenem.    12/11@1234 (Danette):  Patient seen and  examined alone in her room.  She was lying in bed and opens eyes to voice.  She makes eye contact and if you ask her if she was doing okay she shakes her head yes and then stops responding and goes back to sleep.  At this point she refuses to follow any commands.  She had a PICC line paced to right upper extremity.  T-max 99.2° in last 24 hours.  T-max since admission 100.4.  WBC slightly elevated at 24.74, H&H decreased to date 6.8/20.5, platelets increased at 582, neutrophils 73.6%, no bands, BUN/CR improved at 21/0.6 with creatinine clearance 79.7.  CRP decreased from 293 to 55, procalcitonin decreased from 133 to 4.  Last vanc level 18 0.2, trough 26, dose of vancomycin adjusted.  Echocardiogram done today with technically difficult study.  Repeat UA with hematuria, pyuria and yeast though urine is quite clear with no sediment and urinary catheter.  12-5 and 12/6 blood cultures with Proteus mirabilis ESBL, 12/8 blood culture with Enterococcus faecalis and Candida glabrata.  12/10 and 12/11 blood culture pending.  CT abdomen and pelvis with contrast with hydronephrosis and hydroureter with no obstruction, gallbladder fluid collection with no biliary dilatation.     12/10/2023 Dr. Calvo covering for the weekend.  Patient looks much more comfortable today, compared to yesterday.  She is nonverbal as usual.  T-max 99.8°.  WBC worsened 20--24.8.  Blood cultures from 12/ 8 grew Enterococcus vancomycin was started yesterday.  Tonight there are morbid news.  C glabrata from blood cultures.    12/09/2023.  Dr. Calvo covering for the weekend.  Afebrile.  T-max 98.7° but she does feel warm on physical exam.  I hope she is not about to spike a fever.    Nice drop in WBC 40.9--39--24.4--22.8--20.79.  Nice drop in procalcitonin 133--17.9   Nice drop in CRP:  293--143  Repeat blood cultures of 12/08/2023 are negative to date.  Hemoglobin 9.6--7.9--7.9--7.1--7.2.  Platelets are still elevated 577--492--474--421--495.  Catheter  was exchanged on the 6th.  Urine is almost clear.    12/7@1246 (Danette):  Patient seen and examined in her room.  She was lying in bed and opens eyes to voice and initially makes eye contact then turns away.  She does not follow commands or move any extremities spontaneously.  T-max 100.4° at 7:00 p.m. yesterday.  WBC decreased to 24.48 from 30/9 0.98, yesterday 37% bands, none today, + left shift, BUN/CR improving at 40/0.9.  , lactic acid decreased to 1.2, procalcitonin 133.  MRSA screen was negative, vanc level 12.0.  Respiratory virus panel negative, 12/6 blood cultures negative, sputum culture not obtain, 12/5 blood cultures with 4 4 bottles positive for Proteus species.  Urine culture with Gram-negative sheryl non lactose .      Antibiotics (From admission, onward)      Start     Stop Route Frequency Ordered    12/11/23 1800  vancomycin in dextrose 5 % 1 gram/250 mL IVPB 1,000 mg         -- IV Every 24 hours (non-standard times) 12/11/23 1743    12/09/23 1607  vancomycin - pharmacy to dose  (vancomycin IVPB (PEDS and ADULTS))        See Hyperspace for full Linked Orders Report.    -- IV pharmacy to manage frequency 12/09/23 1507    12/08/23 2100  meropenem 1 g in sodium chloride 0.9 % 100 mL IVPB (ready to mix system)        Note to Pharmacy: Original order: meropenem 1 g Q 8 hours    -- IV Every 8 hours (non-standard times) 12/08/23 1627    12/06/23 0900  mupirocin 2 % ointment         12/11/23 0859 Nasl 2 times daily 12/05/23 2308          ASSESSMENT and PLAN     1. Sepsis with polymicrobial bacteremia and fungemia - Proteus mirabilis ESBL, Enterococcus faecalis and Candida glabrata  -12/5 blood cultures x2 sets with 4 of 4 bottles positive for Proteus mirabilis ESBL  -12/6 blood cultures x2 sets 1/4 bottles positive for Proteus mirabilis ESBL  -12/8 blood cultures x2 sets with 1 bottle of 1 set positive for Enterococcus faecalis and 1 bottle of 1 set positive with Candida glabrata   -12/10  blood culture x1 set ngtd, pending final  -12/11 blood cultures x3 sets ngtd, pending final  -12/12 blood cultures x3 pending  -37% bands, WBC 40.96-->39.98-->24.48-->22.83-->20.79-->24.88-->24.74-->21.62  -Procalcitonin 133.671-->17.936-->4.179  --->143-->55  -serum lactate 3.8-->3.0-->1.2  -Urine source? del rosario changed, decubitus appears clean  12/05/2023 urine culture Proteus mirabilis ESBL; 12/11 repeat urine cx pending UA with yeast, pyuria and hematuria  -12/11 Urine culture ngtd, pending final  -Urine culture on prior visit, 10/24/2023 had Enterococcus faecalis.   -echocardiogram and GT negative for vegetation    2. Acute respiratory failure with hypoxia with possible right-sided aspiration pneumonia and right hilar lymphadenopathy                -on 1-2 L O2 - weaned off  -CT chest with patch really nodular opacities of the right upper lobe possibly aspiration pneumonitis and right middle and lower lobe with pneumonia and/or atelectasis  -Improving, on room air   -CT chest with right-greater-than-left bilateral opacities some improvement in right lower lobe and slight progression and left lower lobe.  -CT abdomen and pelvis with contrast from today shows ill-defined fluid collection within gallbladder fossa with no biliary dilatation, mildly enlarged right hilar lymph node, progression and left lower lobe, improvement in right lower lobe, unchanged masslike lesion long the anterior margin of the spleen, bilateral hydronephrosis and hydroureter without obstructing etiology with nondistended urinary catheter and diffuse body wall edema.  Possible small hip joint effusions    3. Impaired cognition and mobility s/p CVA living in SNF with chronic indwelling Del Rosario catheter and decubitus ulcer    4. Acute kidney injury on admission, resolved with CT with bilateral hydronephrosis and hydroureter with no obstruction  -renal ultrasound with probable mild right hydronephrosis no stones; she was seen by Urology  on last admission for mild right hydroureteronephrosis possibly due to neurogenic bladder and reflex versus obstruction from poorly compliant bladder   -CT abdomen and pelvis with shotty retroperitoneal lymph nodes, 1 mm calcification in distal left ureter, cholelithiasis  -Repeat CT abdomen and pelvis with contrast with bilateral hydronephrosis and hydroureter with no obstruction     5. Chronic medical problems including diabetes mellitus, hypertension,  bed-bound, and COPD, anemia   -HGB 6.8/20.5; baseline around 8-9 but was 6.8 in October with 1u PRBC  on 10/26        RECOMMENDATIONS:  Continue meropenem  1 g IV every 8 hours PM ESBL  Continue vancomycin with pharmacy to dose, for Enterococcus species  Continue micafungin for C glabrata candidemia.    Monitor inflammatory markers, WBC and diff - all improving  PICC placed on 12/11  Wound care to all affected areas  Aspiration precautions   GT pending  Pending cystoscopy with stents per Urology - Scheduled for tomorrow  Recommend follow-up CT chest for resolution of right hilar lymphadenopathy    DW Dr Cochran, Hospitalist    Thank you for this consult. Please send [employer] secure chat with any questions.       SUBJECTIVE    Review of Systems  Unable to obtain due to aphasia    OBJECTIVE   Temp:  [98 °F (36.7 °C)-100.2 °F (37.9 °C)] 99.1 °F (37.3 °C)  Pulse:  [78-95] 78  Resp:  [17-20] 18  SpO2:  [95 %-99 %] 95 %  BP: (161-182)/(77-85) 175/82  Temp:  [98 °F (36.7 °C)-100.2 °F (37.9 °C)]   Temp: 99.1 °F (37.3 °C) (12/13/23 1100)  Pulse: 78 (12/13/23 1100)  Resp: 18 (12/13/23 1100)  BP: (!) 175/82 (12/13/23 1100)  SpO2: 95 % (12/13/23 1100)    Intake/Output Summary (Last 24 hours) at 12/13/2023 1427  Last data filed at 12/13/2023 0906  Gross per 24 hour   Intake 450 ml   Output 3500 ml   Net -3050 ml          Physical Exam  General:  Eyes open to voice, answers a few questions with appropriate words then stops responding with poor attention.  Eyes: Eyes with no icterus  or injection.  No exudates.  Ears:  Hearing seems grossly intact - responds to voice.  Mouth: Not able to visualize oral cavity  Cardiovascular: Regular rate and rhythm, no murmurs, Mild generalized edema somewhat improved.  Respiratory:  Clear anteriorly, on RA, no tachypnea or increased work of breathing.   Gastrointestinal:  Soft and obese with active bowel sounds, no distention.  Peg tube clamped. Minimal redness, no drainage at PEG site.Incontinent of stool.  Genitourinary:  Urinary catheter with cloudy urine.  Musculoskeletal:  No spontaneous movement, heel protectors in place.   Skin: Pale, warm and dry,  sacral wound with no surrounding erythema.  Blisters are dried on thighs.  She has a new small wound above the sacral decubitus.  Neuro:  Initially responds to voice, some tracking, loses interest quickly and becomes silent, does not move any extremities spontaneously.  Psych:  No agitation.  VAD: PICC rt upper extremity  Isolation: Contact for ESBL    WOUNDS    12/13:                12/6:                         Significant Labs: All pertinent labs within the past 24 hours have been reviewed.    CBC LAST 7 DAYS  Recent Labs   Lab 12/07/23  0508 12/08/23  0544 12/09/23  0547 12/10/23  0626 12/11/23  0629 12/12/23  0855   WBC 24.48* 22.83* 20.79* 24.88* 24.74* 21.62*   RBC 3.04* 2.82* 2.88* 2.85* 2.69* 2.93*   HGB 7.9* 7.1* 7.2* 7.3* 6.8* 7.4*   HCT 23.0* 21.8* 21.9* 21.9* 20.5* 22.1*   MCV 76* 77* 76* 77* 76* 75*   MCH 26.0* 25.2* 25.0* 25.6* 25.3* 25.3*   MCHC 34.3 32.6 32.9 33.3 33.2 33.5   RDW 21.5* 22.0* 22.3* 22.9* 22.5* 22.5*   * 421 495* 383 582* 766*   MPV 10.3 10.9 10.7 11.5 10.9 10.6   GRAN 87.7*  21.4* 77.5*  17.7* 72.0 76.0* 73.6*  18.2* 75.4*  16.3*   LYMPH 5.3*  1.3 10.2*  2.3 12.0* 14.0*  CANCELED 11.0*  2.7 11.1*  2.4   MONO 4.6  1.1* 8.2  1.9* 10.0 5.0  CANCELED 7.6  1.9* 6.9  1.5*   BASO 0.08 0.08  --  CANCELED 0.11 0.11   NRBC 0 0 0 0 0 0       CHEMISTRY LAST 7  "DAYS  Recent Labs   Lab 12/07/23  0508 12/08/23  0544 12/09/23  0547 12/10/23  0626 12/11/23  0629 12/12/23  0855 12/13/23  0606    142 143 140 136 136 136   K 3.9 3.8 4.0 4.3 4.2 3.9 4.9    108 109 108 105 106 106   CO2 25 27 28 26 25 23 19*   ANIONGAP 10 7* 6* 6* 6* 7* 11   BUN 40* 32* 25* 24* 21 15 12   CREATININE 0.9 0.7 0.6 0.6 0.6 0.5 0.5   * 120* 110 140* 123* 120* 72   CALCIUM 9.2 9.1 8.9 8.5* 8.3* 8.6* 8.5*   MG 2.0 1.8 1.8 2.0 2.0 1.9 1.9   ALBUMIN  --   --   --   --   --  2.8* 3.1*   PROT  --   --   --   --   --  6.8 6.8   ALKPHOS  --   --   --   --   --  201* 199*   ALT  --   --   --   --   --  85* 60*   AST  --   --   --   --   --  46* 38   BILITOT  --   --   --   --   --  0.6 0.6         Estimated Creatinine Clearance: 95.6 mL/min (based on SCr of 0.5 mg/dL).    INFLAMMATORY/PROCAL  LAST 7 DAYS  Recent Labs   Lab 12/09/23  0547 12/11/23  0937   PROCAL 17.936* 4.179*   .3* 55.1*       No results found for: "ESR"  CRP   Date Value Ref Range Status   12/11/2023 55.1 (H) 0.0 - 8.2 mg/L Final   12/09/2023 143.3 (H) 0.0 - 8.2 mg/L Final   12/06/2023 293.0 (H) 0.0 - 8.2 mg/L Final   10/25/2023 92.6 (H) 0.0 - 8.2 mg/L Final   06/15/2023 0.65 <0.76 mg/dL Final   11/23/2021 3.21 (H) <0.76 mg/dL Final   07/04/2021 0.34 <0.76 mg/dL Final     PRIOR  MICROBIOLOGY:    Susceptibility data from last 90 days.  Collected Specimen Info Organism Amp/Sulbactam Ampicillin Cefazolin Cefepime Ceftriaxone Ciprofloxacin Ertapenem Genatmicin Synergy Screen Gentamicin Levofloxacin Meropenem Nitrofurantoin PIPERACILLIN/TAZO SUSCEPTIBILITY   12/08/23 Blood Candida glabrata                12/08/23 Blood Enterococcus faecalis   S       R        12/06/23 Blood from Antecubital, Right Hand Proteus mirabilis ESBL                12/06/23 Blood from Peripheral, Right Arm Proteus mirabilis ESBL                12/05/23 Blood Proteus mirabilis ESBL                12/05/23 Blood Proteus mirabilis ESBL  S  R  R  R  R  " R  S   S  R  S   S   12/05/23 Urine Proteus mirabilis ESBL  S  R  R  R  R  R  S   S  R  S  R  S   10/24/23 Urine from Clean Catch Enterococcus faecalis   S           S    10/24/23 Blood from Antecubital, Right No growth after 5 days.                10/24/23 Blood from Antecubital, Left No growth after 5 days.                  Collected Specimen Info Organism Tetracycline Tobramycin Trimeth/Sulfa Vancomycin   12/08/23 Blood Candida glabrata       12/08/23 Blood Enterococcus faecalis     S   12/06/23 Blood from Antecubital, Right Hand Proteus mirabilis ESBL       12/06/23 Blood from Peripheral, Right Arm Proteus mirabilis ESBL       12/05/23 Blood Proteus mirabilis ESBL       12/05/23 Blood Proteus mirabilis ESBL  R  S  R    12/05/23 Urine Proteus mirabilis ESBL  R  S  R    10/24/23 Urine from Clean Catch Enterococcus faecalis  S    S   10/24/23 Blood from Antecubital, Right No growth after 5 days.       10/24/23 Blood from Antecubital, Left No growth after 5 days.           LAST 7 DAYS MICROBIOLOGY   Microbiology Results (last 7 days)       Procedure Component Value Units Date/Time    Blood culture [[id number]] Collected: 12/11/23 0937    Order Status: Completed Specimen: Blood Updated: 12/13/23 1232     Blood Culture, Routine No Growth to date      No Growth to date      No Growth to date    Blood culture [[id number]] Collected: 12/11/23 0628    Order Status: Completed Specimen: Blood Updated: 12/13/23 1033     Blood Culture, Routine No Growth to date      No Growth to date      No Growth to date    Blood culture [[id number]] Collected: 12/12/23 0853    Order Status: Completed Specimen: Blood Updated: 12/13/23 1033     Blood Culture, Routine No Growth to date      No Growth to date    Narrative:      Collection has been rescheduled by LND at 12/12/2023 07:02 Reason:   Unable to collect  Collection has been rescheduled by LND at 12/12/2023 07:02 Reason:   Unable to collect    Blood culture [[id number]] Collected:  12/12/23 0854    Order Status: Completed Specimen: Blood Updated: 12/13/23 1033     Blood Culture, Routine No Growth to date      No Growth to date    Narrative:      Collection has been rescheduled by LND at 12/12/2023 07:01 Reason:   Unable to collect  Collection has been rescheduled by LND at 12/12/2023 07:01 Reason:   Unable to collect    Blood culture [[id number]] Collected: 12/12/23 0853    Order Status: Completed Specimen: Blood Updated: 12/13/23 1033     Blood Culture, Routine No Growth to date      No Growth to date    Narrative:      Collection has been rescheduled by LND at 12/12/2023 07:02 Reason:   Unable to collect  Collection has been rescheduled by LND at 12/12/2023 07:02 Reason:   Unable to collect    Blood culture [[id number]] Collected: 12/10/23 0626    Order Status: Completed Specimen: Blood Updated: 12/13/23 0832     Blood Culture, Routine No Growth to date      No Growth to date      No Growth to date      No Growth to date    Urine culture [[id number]] Collected: 12/11/23 0938    Order Status: Completed Specimen: Urine Updated: 12/13/23 0725     Urine Culture, Routine No growth to date    Narrative:      Specimen Source->Urine    Blood culture [[id number]]  (Abnormal) Collected: 12/08/23 1208    Order Status: Completed Specimen: Blood Updated: 12/12/23 0641     Blood Culture, Routine Gram stain aer bottle: budding yeast      Results called to and read back by: Mary Braun LPN [facility],      12/10/2023 17:29      AUBREY GLABRATA    Blood culture [[id number]]  (Abnormal)  (Susceptibility) Collected: 12/08/23 1208    Order Status: Completed Specimen: Blood Updated: 12/11/23 0639     Blood Culture, Routine Gram stain aer bottle: Gram positive cocci      Results called to and read back by: Priscilla Boyce RN on [facility], 12/09/2023 14:53 vcb      ENTEROCOCCUS FAECALIS    Rapid Organism ID by PCR (from Blood culture) [[id number]]  (Abnormal) Collected: 12/08/23 1208    Order Status: Completed  Updated: 12/10/23 9343     Enterococcus faecalis Not Detected     Enterococcus faecium Not Detected     Listeria monocytogenes Not Detected     Staphylococcus spp. Not Detected     Staphylococcus aureus Not Detected     Staphylococcus epidermidis Not Detected     Staphylococcus lugdunensis Not Detected     Streptococcus species Not Detected     Streptococcus agalactiae Not Detected     Streptococcus pneumoniae Not Detected     Streptococcus pyogenes Not Detected     Acinetobacter calcoaceticus/baumannii complex Not Detected     Bacteroides fragilis Not Detected     Enterobacterales Not Detected     Enterobacter cloacae complex Not Detected     Escherichia coli Not Detected     Klebsiella aerogenes Not Detected     Klebsiella oxytoca Not Detected     Klebsiella pneumoniae group Not Detected     Proteus Not Detected     Salmonella sp Not Detected     Serratia marcescens Not Detected     Haemophilus influenzae Not Detected     Neisseria meningtidis Not Detected     Pseudomonas aeruginosa Not Detected     Stenotrophomonas maltophilia Not Detected     Candida albicans Not Detected     Candida auris Not Detected     Juli glabrata Detected     Juli krusei Not Detected     Candida parapsilosis Not Detected     Candida tropicalis Not Detected     Cryptococcus neoformans/gattii Not Detected     CTX-M (ESBL ) Test not applicable     IMP (Carbapenem resistant) Test not applicable     KPC resistance gene (Carbapenem resistant) Test not applicable     mcr-1  Test not applicable     mec A/C  Test not applicable     mec A/C and MREJ (MRSA) gene Test not applicable     NDM (Carbapenem resistant) Test not applicable     OXA-48-like (Carbapenem resistant) Test not applicable     van A/B (VRE gene) Test not applicable     VIM (Carbapenem resistant) Test not applicable    Blood culture [[id number]]  (Abnormal) Collected: 12/06/23 1521    Order Status: Completed Specimen: Blood from Peripheral, Right Arm Updated: 12/10/23  0745     Blood Culture, Routine Gram stain aer bottle: Gram negative rods      Positive results previously called 12/09/2023  00:48 KS3      PROTEUS MIRABILIS ESBL  For susceptibility see order #[id number]  Known ESBL patient      Narrative:      Collection has been rescheduled by CH10 at 12/06/2023 13:17 Reason:   Duplicate order waiting for dr lott to cancel. Spoke to rob RN.  Collection has been rescheduled by CH10 at 12/06/2023 13:17 Reason:   Duplicate order waiting for dr lott to cancel. Spoke to rob GARCIA.    Rapid Organism ID by PCR (from Blood culture) [[id number]]  (Abnormal) Collected: 12/08/23 1208    Order Status: Completed Updated: 12/09/23 1551     Enterococcus faecalis Detected     Enterococcus faecium Not Detected     Listeria monocytogenes Not Detected     Staphylococcus spp. Not Detected     Staphylococcus aureus Not Detected     Staphylococcus epidermidis Not Detected     Staphylococcus lugdunensis Not Detected     Streptococcus species Not Detected     Streptococcus agalactiae Not Detected     Streptococcus pneumoniae Not Detected     Streptococcus pyogenes Not Detected     Acinetobacter calcoaceticus/baumannii complex Not Detected     Bacteroides fragilis Not Detected     Enterobacterales Not Detected     Enterobacter cloacae complex Not Detected     Escherichia coli Not Detected     Klebsiella aerogenes Not Detected     Klebsiella oxytoca Not Detected     Klebsiella pneumoniae group Not Detected     Proteus Not Detected     Salmonella sp Not Detected     Serratia marcescens Not Detected     Haemophilus influenzae Not Detected     Neisseria meningtidis Not Detected     Pseudomonas aeruginosa Not Detected     Stenotrophomonas maltophilia Not Detected     Candida albicans Not Detected     Candida auris Not Detected     Candida glabrata Not Detected     Candida krusei Not Detected     Candida parapsilosis Not Detected     Candida tropicalis Not Detected     Cryptococcus neoformans/gattii Not  Detected     CTX-M (ESBL ) Test not applicable     IMP (Carbapenem resistant) Test not applicable     KPC resistance gene (Carbapenem resistant) Test not applicable     mcr-1  Test not applicable     mec A/C  Test not applicable     mec A/C and MREJ (MRSA) gene Test not applicable     NDM (Carbapenem resistant) Test not applicable     OXA-48-like (Carbapenem resistant) Test not applicable     van A/B (VRE gene) Not Detected     VIM (Carbapenem resistant) Test not applicable    Urine culture [[id number]]  (Abnormal)  (Susceptibility) Collected: 12/05/23 2003    Order Status: Completed Specimen: Urine Updated: 12/08/23 0757     Urine Culture, Routine PROTEUS MIRABILIS ESBL  >100,000 cfu/ml  Known ESBL patient      Narrative:      Specimen Source->Urine    Blood culture [[id number]]  (Abnormal) Collected: 12/06/23 1522    Order Status: Completed Specimen: Blood from Antecubital, Right Hand Updated: 12/08/23 0738     Blood Culture, Routine Gram stain aer bottle: Gram negative rods      Positive results previously called 12/07/2023  18:19 KS3      PROTEUS MIRABILIS ESBL  Known ESBL patient  For susceptibility see order #[id number]      Narrative:      Collection has been rescheduled by [facility] at 12/06/2023 13:17 Reason:   Duplicate order waiting for dr lott to cancel. Spoke to rob GARCIA.  Collection has been rescheduled by [facility] at 12/06/2023 13:17 Reason:   Duplicate order waiting for dr lott to cancel. Spoke to rob GARCIA.    Blood culture x two cultures. Draw prior to antibiotics. [[id number]]  (Abnormal) Collected: 12/05/23 2017    Order Status: Completed Specimen: Blood Updated: 12/08/23 0736     Blood Culture, Routine Gram stain lisa bottle: Gram negative rods      Gram stain aer bottle: Gram negative rods      Results called to and read back by:Rob Dugan RN  12/06/2023      10:39 JBM      Positive results previously called      PROTEUS MIRABILIS ESBL  Known ESBL patient  For susceptibility see order  #[id number]      Narrative:      Aerobic and anaerobic  Collection has been rescheduled by JS at 12/05/2023 19:29 Reason:   Meline,ER tech said to come back in a minute. Patient needs to be   cleaned.  Collection has been rescheduled by [email or web address] at 12/05/2023 20:20 Reason:   Done  Collection has been rescheduled by [email or web address] at 12/05/2023 19:29 Reason:   Meline,ER tech said to come back in a minute. Patient needs to be   cleaned.  Collection has been rescheduled by JS at 12/05/2023 20:20 Reason:   Done    Blood culture x two cultures. Draw prior to antibiotics. [[id number]]  (Abnormal)  (Susceptibility) Collected: 12/05/23 2007    Order Status: Completed Specimen: Blood Updated: 12/08/23 0735     Blood Culture, Routine Gram stain lisa bottle: Gram negative rods      Gram stain aer bottle: Gram negative rods      Results called to and read back by:Natalie Dugan RN  12/06/2023      10:39 JBM      Positive results previously called      PROTEUS MIRABILIS ESBL  Known ESBL patient      Narrative:      Aerobic and anaerobic  Collection has been rescheduled by JS at 12/05/2023 19:29 Reason:   Meline,ER tech said to come back in a minute. Patient needs to be   cleaned.  Collection has been rescheduled by JS at 12/05/2023 20:20 Reason:   Done  Collection has been rescheduled by JS at 12/05/2023 19:29 Reason:   Meline,ER tech said to come back in a minute. Patient needs to be   cleaned.  Collection has been rescheduled by [facility] at 12/05/2023 20:20 Reason:   Done    MRSA Screen by PCR [[id number]] Collected: 12/06/23 1645    Order Status: Completed Specimen: Nasopharyngeal Swab from Nasal Updated: 12/06/23 2239     MRSA SCREEN BY PCR Negative    Respiratory Infection Panel (PCR), Nasopharyngeal [[id number]] Collected: 12/06/23 1645    Order Status: Completed Specimen: Nasopharyngeal Swab Updated: 12/06/23 1905     Respiratory Infection Panel Source NP swab     Adenovirus Not Detected     Coronavirus 229E, Common Cold Virus  Not Detected     Coronavirus HKU1, Common Cold Virus Not Detected     Coronavirus NL63, Common Cold Virus Not Detected     Coronavirus OC43, Common Cold Virus Not Detected     Comment: Coronavirus strains 229E, HKU1, NL63, and OC43 can cause the common   cold   and are not associated with the respiratory disease outbreak caused   by  the COVID-19 (SARS-CoV-2 novel Coronavirus) strain.           SARS-CoV2 (COVID-19) Qualitative PCR Not Detected     Human Metapneumovirus Not Detected     Human Rhinovirus/Enterovirus Not Detected     Influenza A (subtypes H1, H1-2009,H3) Not Detected     Influenza B Not Detected     Parainfluenza Virus 1 Not Detected     Parainfluenza Virus 2 Not Detected     Parainfluenza Virus 3 Not Detected     Parainfluenza Virus 4 Not Detected     Respiratory Syncytial Virus Not Detected     Bordetella Parapertussis ([id number]) Not Detected     Bordetella pertussis (ptxP) Not Detected     Chlamydia pneumoniae Not Detected     Mycoplasma pneumoniae Not Detected     Comment: Respiratory Infection Panel testing performed by Multiplex PCR.       Narrative:      Respiratory Infection Panel source->NP Swab    Culture, Respiratory with Gram Stain [[id number]]     Order Status: Sent Specimen: Sputum, Induced               CURRENT/PREVIOUS VISIT EKG  Results for orders placed or performed during the hospital encounter of 12/05/23   EKG 12-LEAD    Collection Time: 12/11/23  4:19 PM    Narrative    Test Reason : I48.91,I48.92,    Vent. Rate : 081 BPM     Atrial Rate : 081 BPM     P-R Int : 144 ms          QRS Dur : 068 ms      QT Int : 404 ms       P-R-T Axes : 051 026 054 degrees     QTc Int : 469 ms    Normal sinus rhythm  Normal ECG  When compared with ECG of 05-DEC-2023 19:35,  No significant change was found    Referred By: [email or web address]   SELF           Confirmed By:        ECHO 12/11/23 1319  Left Ventricle The left ventricle is normal in size. Normal wall thickness. Normal wall motion. There is normal  systolic function with a visually estimated ejection fraction of 60 - 65%. There is normal diastolic function.   Right Ventricle Normal right ventricular cavity size. Wall thickness is normal. Right ventricle wall motion  is normal. Systolic function is normal.   Left Atrium Normal left atrial size.   Right Atrium Normal right atrial size.   Aortic Valve The aortic valve is structurally normal. There is normal leaflet mobility. Aortic valve peak velocity is 1.25 m/s. Mean gradient is 3 mmHg.   Mitral Valve The mitral valve is structurally normal. There is normal leaflet mobility. The mean pressure gradient across the mitral valve is 2 mmHg at a heart rate of  bpm.   Tricuspid Valve The tricuspid valve is structurally normal. There is normal leaflet mobility.   Pulmonic Valve The pulmonic valve is structurally normal. There is normal leaflet mobility.   IVC/SVC Normal venous pressure at 3 mmHg.   Ascending Aorta Aortic root is normal in size. Ascending aorta is normal.   Pericardium and Other Findings There is no pericardial effusion.   Pulmonary Artery The estimated pulmonary artery systolic pressure is 11 mmHg.        GT 12/12/2023    Left Ventricle: The left ventricle is normal in size. Normal wall thickness. Normal wall motion. There is normal systolic function with a visually estimated ejection fraction of 60 - 65%.    Right Ventricle: Normal right ventricular cavity size. Wall thickness is normal. Right ventricle wall motion  is normal. Systolic function is normal.    Left Atrium: There is no thrombus in the left atrial cavity.    Mitral Valve: There is no stenosis.    No echocardiographic evidence of vegetation or endocarditis seen      Significant Imaging: I have reviewed all relevant and available imaging results/findings within the past 24 hours.    US Retroperitoneal Complete 12/06/23 0123   Limited evaluation. Probable mild right hydronephrosis.     X-Ray Chest AP Portable 12/05/23 1939   IMPRESSION: No  acute pulmonary process    CT Chest Without Contrast 12/06/23 1817   Patchy nodular opacities involving the right upper lobe with surrounding groundglass attenuation, concerning for infectious/inflammatory process including aspiration pneumonitis.   Airspace consolidation with volume loss right middle lobe and right lower lobe, likely representing a combination of pneumonia and atelectasis.    CT Renal Stone Study Abd Pelvis WO 12/06/23 1816   Negative for obstructive uropathy.   Probable 1 mm punctate calculus within the distal left ureter.   Cholelithiasis.   Additional incidental findings as described above    CT Chest Abdomen Pelvis With IV Contrast (XPD) Routine Oral Contrast 12/11/23 1142  Mild bilateral hydronephrosis and hydroureter to the level of the midpelvis without defined etiology. The urinary bladder contains a Mathur catheter and is nondistended.   Persistence of patchy pulmonary airspace opacities, greater on the right. Correlate with evidence of multifocal pneumonia.   Ill-defined fluid collection within the gallbladder fossa similar to prior studies.   Mildly prominent right hilar lymph node possibly reactive. CT Follow-up recommended.   Multiple additional observations as above.        Roberta Samaniego NP  Date of Service: 12/13/2023  2:04 PM

## 2023-12-13 NOTE — PROGRESS NOTES
[facility] Medicine  Progress Note    Patient name: Steff Johnson  MRN: [id number]  Admit Date: 12/5/2023   LOS: 8 days     SUBJECTIVE:     Principal problem: Severe sepsis    HPI:  Patient is a 63-year-old female with history of severe CVA with aphasia, upper and lower extremity weakness, bed-bound, cognitive impairment, hypertension, hyperlipidemia, type 2 diabetes, COPD presents to the emergency room for concerns of hematuria and hypoxia.  Patient nonverbal at baseline, history provided by ED physician.  Tried to contact daughter, was unsuccessful.  Per ED, patient presented from [facility], staff noticed bloody urine output from her chronic indwelling Mathur catheter as well as hypoxia.  Non-rebreather was placed on patient and this improved her oxygenation saturation.  Brought to ED by EMS.  Per ED physician patient did have episode of emesis, likely aspirating.     On admission temp 100.4, heart rate 111, O2 89%, WBC 40.96, hemoglobin 9.6, potassium 5.2, creatinine 1.7, lactic 3.8, procalcitonin 133.671.  Review of CXR did not show any obvious consolidations.  UA consistent with UTI.      Interval History:      12/13- plan for cysto, wound care in with patient currently.  GT  negative for endocarditis/vegetation.      12/12- plan for GT today, will give a unit prbc.  Urology saw her and plan for cysto and possible stents tomorrow.     12/11- c. Glabrata on blood pcr.  Started on micafungin.  TTE pending read, GT ordered, abd ct with hydro and fluid collection in GB fossa.      12/10-  tmax 99.8 overnight, feels warm on exam.  May need to get GT to r/o vegetation.     12/9- patient had blood cx + yesterday GPC.  Started on vanc.  Repeat blood cx.  May need to consider echo to eval for vegetation.     12/8- axillary temp 99.5, wbc 22, blood cx sensitivities still pending.      12/7- she is still running low grade temp overnight, wbc improving but still elevated at 24. Continue IV  abx.   Wound and ID following    Scheduled Meds:   amLODIPine  10 mg Oral Daily    apixaban  2.5 mg Per G Tube BID    aspirin  81 mg Per G Tube Daily    atorvastatin  80 mg Per G Tube QHS    famotidine  20 mg Oral Daily    insulin detemir U-100  35 Units Subcutaneous Daily    ipratropium  0.5 mg Nebulization Q6H    levetiracetam  500 mg Per G Tube BID    meropenem (MERREM) IVPB  1 g Intravenous Q8H    metoprolol tartrate  25 mg Per G Tube BID    micafungin (MYCAMINE) IVPB  100 mg Intravenous Q24H    polyethylene glycol  17 g Per G Tube Daily    tamsulosin  0.4 mg Oral Daily    vancomycin (VANCOCIN) IV (PEDS and ADULTS)  1,000 mg Intravenous Q24H     Continuous Infusions:      PRN Meds:0.9%  NaCl infusion (for blood administration), acetaminophen, albuterol-ipratropium, dextrose 50%, dextrose 50%, glucagon (human recombinant), glucose, glucose, hydrALAZINE, insulin aspart U-100, magnesium oxide, magnesium oxide, ondansetron, potassium bicarbonate, potassium bicarbonate, potassium bicarbonate, potassium, sodium phosphates, potassium, sodium phosphates, potassium, sodium phosphates, sodium chloride 0.9%, Pharmacy to dose Vancomycin consult **AND** vancomycin - pharmacy to dose    Review of patient's allergies indicates:  No Known Allergies    Review of Systems: As per interval history    OBJECTIVE:     Vital Signs (Most Recent)  Temp: 99.1 °F (37.3 °C) (12/13/23 1100)  Pulse: 78 (12/13/23 1100)  Resp: 18 (12/13/23 1100)  BP: (!) 175/82 (12/13/23 1100)  SpO2: 95 % (12/13/23 1100)    Vital Signs Range (Last 24H):  Temp:  [97.8 °F (36.6 °C)-100.2 °F (37.9 °C)]   Pulse:  [78-96]   Resp:  [17-29]   BP: (145-182)/(72-88)   SpO2:  [95 %-100 %]     I & O (Last 24H):  Intake/Output Summary (Last 24 hours) at 12/13/2023 1129  Last data filed at 12/13/2023 0906  Gross per 24 hour   Intake 750 ml   Output 3500 ml   Net -2750 ml         Physical Exam:    Gen- non-verbal, min responsive.  Eyes open, doesn't track  CV- RRR  Pulm-  even respirations, no tachypnea, accessory use.    GI- soft, non-tender.  PEG LLQ  - deferred  Skin- warm, dry.        Laboratory:  I have reviewed all pertinent lab results within the past 24 hours.      ASSESSMENT/PLAN:         Active Hospital Problems    Diagnosis  POA    *Severe sepsis [A41.9, R65.20]  Yes    ESBL (extended spectrum beta-lactamase) producing bacteria infection [A49.9, Z16.12]  Yes    Acute respiratory failure with hypoxia [J96.01]  Yes    Bacteremia due to Proteus species [R78.81, B96.4]  Yes    Bedridden [Z74.01]  Not Applicable    Blisters of multiple sites [R23.8]  Yes    Chronic indwelling Mathur catheter [Z97.8]  Not Applicable    Cognitive impairment [R41.89]  Yes    Hydronephrosis [N13.30]  Yes    Hypoxia [R09.02]  Yes    Pressure injury of sacral region, stage 3 [L89.153]  Yes    Aspiration pneumonia of right lung due to vomit [J69.0]  Yes    Leucocytosis [D72.829]  Yes    UTI (urinary tract infection) [N39.0]  Yes    Hyperkalemia [E87.5]  Yes    Pressure injury of sacral region, unstageable [L89.150]  Yes    Aphasia [R47.01]  Yes    Cognitive communication deficit [R41.841]  Yes    Combined forms of age-related cataract, bilateral [H25.813]  Yes    COURTNEY (acute kidney injury) [N17.9]  Yes    CVA (cerebral vascular accident) [I63.9]  Yes    Mixed hyperlipidemia [E78.2]  Yes    Hyperglycemia due to type 2 diabetes mellitus [E11.65]  Yes    Hypertension associated with diabetes [E11.59, I15.2]  Yes      Resolved Hospital Problems   No resolved problems to display.         Plan:     Severe sepsis, improved  Repeat lactic down to 1.2  Continue empiric abx  Follow up cx s/s   12/8 bcx + gpc  Start vanc, repeat blood cx  PCR with c. Glabrata  Micafungin started  echo/TG negative for vegetation  CT with gb fossa fluid, BL hydro  Cysto planned per urology  No plan for cx/bx of fluid collection per IR  Follow up with ID    CVA (cerebral vascular accident)  History of CVA, nonverbal and bed-bound  at baseline  Has PEG tube, nutrition consulted for tube feedings.    Has chronic indwelling catheter, replaced in the emergency room.        Hyperkalemia  This patient has hyperkalemia which is uncontrolled. We will monitor for arrhythmias with EKG or continuous telemetry. We will treat the hyperkalemia with IV fluids, follow up repeat BMP. The likely etiology of the hyperkalemia is COURTNEY.  The patients latest potassium has been reviewed and the results are listed below      Recent Labs   Lab 12/05/23 2134   K 5.2*               UTI (urinary tract infection)  Continue with empiric IV antibiotics   Follow up CBC and CMP        Pressure injury of sacral region, unstageable  Wound care consulted        Aphasia  Noted        Cognitive communication deficit  Chronic issue         COURTNEY (acute kidney injury)  Patient with acute kidney injury/acute renal failure likely due to pre-renal azotemia due to dehydration COURTNEY is currently stable. Baseline creatinine  0.6  - Labs reviewed- Renal function/electrolytes with Estimated Creatinine Clearance: 25.4 mL/min (A) (based on SCr of 1.7 mg/dL (H)). according to latest data. Monitor urine output and serial BMP and adjust therapy as needed. Avoid nephrotoxins and renally dose meds for GFR listed above.     Continue IV fluids        Mixed hyperlipidemia  Continue outpatient statin        Hyperglycemia due to type 2 diabetes mellitus  Continue Levemir, started patient on sliding scale        Hypertension associated with diabetes  Continue outpatient antihypertensives, monitor blood pressure    VTE Risk Mitigation (From admission, onward)           Ordered     apixaban tablet 2.5 mg  2 times daily         12/05/23 2300     IP VTE HIGH RISK PATIENT  Once         12/05/23 2300     Place sequential compression device  Until discontinued         12/05/23 2300                        Department Hospital Medicine  Formerly [facility]  Yazan Denise MD  Date of service: 12/13/2023

## 2023-12-14 ENCOUNTER — ANESTHESIA EVENT (OUTPATIENT)
Dept: SURGERY | Facility: HOSPITAL | Age: 63
DRG: 698 | End: 2023-12-14
Payer: MEDICARE

## 2023-12-14 ENCOUNTER — ANESTHESIA (OUTPATIENT)
Dept: SURGERY | Facility: HOSPITAL | Age: 63
DRG: 698 | End: 2023-12-14
Payer: MEDICARE

## 2023-12-14 PROBLEM — J69.0 ASPIRATION PNEUMONIA OF RIGHT LUNG DUE TO VOMIT: Status: RESOLVED | Noted: 2023-12-07 | Resolved: 2023-12-14

## 2023-12-14 PROBLEM — L89.153 PRESSURE INJURY OF SACRAL REGION, STAGE 3: Status: RESOLVED | Noted: 2023-12-07 | Resolved: 2023-12-14

## 2023-12-14 PROBLEM — Z16.12 ESBL (EXTENDED SPECTRUM BETA-LACTAMASE) PRODUCING BACTERIA INFECTION: Status: RESOLVED | Noted: 2023-12-08 | Resolved: 2023-12-14

## 2023-12-14 PROBLEM — A49.9 ESBL (EXTENDED SPECTRUM BETA-LACTAMASE) PRODUCING BACTERIA INFECTION: Status: RESOLVED | Noted: 2023-12-08 | Resolved: 2023-12-14

## 2023-12-14 PROBLEM — R09.02 HYPOXIA: Status: RESOLVED | Noted: 2023-12-07 | Resolved: 2023-12-14

## 2023-12-14 PROBLEM — R41.89 COGNITIVE IMPAIRMENT: Status: RESOLVED | Noted: 2023-12-07 | Resolved: 2023-12-14

## 2023-12-14 LAB
ALBUMIN SERPL BCP-MCNC: 3 G/DL (ref 3.5–5.2)
ALP SERPL-CCNC: 182 U/L (ref 55–135)
ALT SERPL W/O P-5'-P-CCNC: 46 U/L (ref 10–44)
ANION GAP SERPL CALC-SCNC: 9 MMOL/L (ref 8–16)
ANISOCYTOSIS BLD QL SMEAR: SLIGHT
AST SERPL-CCNC: 22 U/L (ref 10–40)
BACTERIA UR CULT: NO GROWTH
BASOPHILS # BLD AUTO: 0.09 K/UL (ref 0–0.2)
BASOPHILS NFR BLD: 0.5 % (ref 0–1.9)
BILIRUB SERPL-MCNC: 0.5 MG/DL (ref 0.1–1)
BLD PROD TYP BPU: NORMAL
BLOOD UNIT EXPIRATION DATE: NORMAL
BLOOD UNIT TYPE CODE: 9500
BLOOD UNIT TYPE: NORMAL
BUN SERPL-MCNC: 13 MG/DL (ref 8–23)
CALCIUM SERPL-MCNC: 8.4 MG/DL (ref 8.7–10.5)
CHLORIDE SERPL-SCNC: 106 MMOL/L (ref 95–110)
CO2 SERPL-SCNC: 22 MMOL/L (ref 23–29)
CODING SYSTEM: NORMAL
CREAT SERPL-MCNC: 0.6 MG/DL (ref 0.5–1.4)
CROSSMATCH INTERPRETATION: NORMAL
DIFFERENTIAL METHOD BLD: ABNORMAL
DISPENSE STATUS: NORMAL
EOSINOPHIL # BLD AUTO: 0.3 K/UL (ref 0–0.5)
EOSINOPHIL NFR BLD: 1.8 % (ref 0–8)
ERYTHROCYTE [DISTWIDTH] IN BLOOD BY AUTOMATED COUNT: 22.8 % (ref 11.5–14.5)
EST. GFR  (NO RACE VARIABLE): >60 ML/MIN/1.73 M^2
GLUCOSE SERPL-MCNC: 105 MG/DL (ref 70–110)
GLUCOSE SERPL-MCNC: 108 MG/DL (ref 70–110)
GLUCOSE SERPL-MCNC: 114 MG/DL (ref 70–110)
GLUCOSE SERPL-MCNC: 117 MG/DL (ref 70–110)
GLUCOSE SERPL-MCNC: 135 MG/DL (ref 70–110)
GLUCOSE SERPL-MCNC: 201 MG/DL (ref 70–110)
HCT VFR BLD AUTO: 21.2 % (ref 37–48.5)
HGB BLD-MCNC: 7 G/DL (ref 12–16)
HYPOCHROMIA BLD QL SMEAR: ABNORMAL
IMM GRANULOCYTES # BLD AUTO: 0.72 K/UL (ref 0–0.04)
IMM GRANULOCYTES NFR BLD AUTO: 4 % (ref 0–0.5)
LYMPHOCYTES # BLD AUTO: 1.9 K/UL (ref 1–4.8)
LYMPHOCYTES NFR BLD: 10.2 % (ref 18–48)
MAGNESIUM SERPL-MCNC: 2 MG/DL (ref 1.6–2.6)
MCH RBC QN AUTO: 25.1 PG (ref 27–31)
MCHC RBC AUTO-ENTMCNC: 33 G/DL (ref 32–36)
MCV RBC AUTO: 76 FL (ref 82–98)
MONOCYTES # BLD AUTO: 1.5 K/UL (ref 0.3–1)
MONOCYTES NFR BLD: 8.4 % (ref 4–15)
NEUTROPHILS # BLD AUTO: 13.6 K/UL (ref 1.8–7.7)
NEUTROPHILS NFR BLD: 75.1 % (ref 38–73)
NRBC BLD-RTO: 0 /100 WBC
NUM UNITS TRANS PACKED RBC: NORMAL
PLATELET # BLD AUTO: 998 K/UL (ref 150–450)
PMV BLD AUTO: 9.8 FL (ref 9.2–12.9)
POLYCHROMASIA BLD QL SMEAR: ABNORMAL
POTASSIUM SERPL-SCNC: 3.8 MMOL/L (ref 3.5–5.1)
PROT SERPL-MCNC: 6.9 G/DL (ref 6–8.4)
RBC # BLD AUTO: 2.79 M/UL (ref 4–5.4)
SODIUM SERPL-SCNC: 137 MMOL/L (ref 136–145)
WBC # BLD AUTO: 18.11 K/UL (ref 3.9–12.7)

## 2023-12-14 PROCEDURE — 63600175 PHARM REV CODE 636 W HCPCS: Performed by: NURSE ANESTHETIST, CERTIFIED REGISTERED

## 2023-12-14 PROCEDURE — 82962 GLUCOSE BLOOD TEST: CPT | Performed by: SPECIALIST

## 2023-12-14 PROCEDURE — 25000003 PHARM REV CODE 250: Performed by: NURSE ANESTHETIST, CERTIFIED REGISTERED

## 2023-12-14 PROCEDURE — 99900035 HC TECH TIME PER 15 MIN (STAT)

## 2023-12-14 PROCEDURE — 36415 COLL VENOUS BLD VENIPUNCTURE: CPT | Performed by: STUDENT IN AN ORGANIZED HEALTH CARE EDUCATION/TRAINING PROGRAM

## 2023-12-14 PROCEDURE — 36000706: Performed by: SPECIALIST

## 2023-12-14 PROCEDURE — D9220A PRA ANESTHESIA: Mod: ANES,,, | Performed by: ANESTHESIOLOGY

## 2023-12-14 PROCEDURE — D9220A PRA ANESTHESIA: Mod: CRNA,,, | Performed by: NURSE ANESTHETIST, CERTIFIED REGISTERED

## 2023-12-14 PROCEDURE — 25000003 PHARM REV CODE 250: Performed by: INTERNAL MEDICINE

## 2023-12-14 PROCEDURE — 71000039 HC RECOVERY, EACH ADD'L HOUR: Performed by: SPECIALIST

## 2023-12-14 PROCEDURE — 12000002 HC ACUTE/MED SURGE SEMI-PRIVATE ROOM

## 2023-12-14 PROCEDURE — D9220A PRA ANESTHESIA: ICD-10-PCS | Mod: CRNA,,, | Performed by: NURSE ANESTHETIST, CERTIFIED REGISTERED

## 2023-12-14 PROCEDURE — 83735 ASSAY OF MAGNESIUM: CPT | Performed by: STUDENT IN AN ORGANIZED HEALTH CARE EDUCATION/TRAINING PROGRAM

## 2023-12-14 PROCEDURE — 85025 COMPLETE CBC W/AUTO DIFF WBC: CPT | Performed by: STUDENT IN AN ORGANIZED HEALTH CARE EDUCATION/TRAINING PROGRAM

## 2023-12-14 PROCEDURE — 36406 VNPNXR<3YRS PHY/QHP OTHER VN: CPT

## 2023-12-14 PROCEDURE — D9220A PRA ANESTHESIA: ICD-10-PCS | Mod: ANES,,, | Performed by: ANESTHESIOLOGY

## 2023-12-14 PROCEDURE — 94761 N-INVAS EAR/PLS OXIMETRY MLT: CPT

## 2023-12-14 PROCEDURE — 36000707: Performed by: SPECIALIST

## 2023-12-14 PROCEDURE — 25000003 PHARM REV CODE 250: Performed by: STUDENT IN AN ORGANIZED HEALTH CARE EDUCATION/TRAINING PROGRAM

## 2023-12-14 PROCEDURE — 63600175 PHARM REV CODE 636 W HCPCS: Performed by: STUDENT IN AN ORGANIZED HEALTH CARE EDUCATION/TRAINING PROGRAM

## 2023-12-14 PROCEDURE — 94640 AIRWAY INHALATION TREATMENT: CPT

## 2023-12-14 PROCEDURE — 99900031 HC PATIENT EDUCATION (STAT)

## 2023-12-14 PROCEDURE — 25000242 PHARM REV CODE 250 ALT 637 W/ HCPCS: Performed by: STUDENT IN AN ORGANIZED HEALTH CARE EDUCATION/TRAINING PROGRAM

## 2023-12-14 PROCEDURE — 63600175 PHARM REV CODE 636 W HCPCS: Performed by: NURSE PRACTITIONER

## 2023-12-14 PROCEDURE — C1769 GUIDE WIRE: HCPCS | Performed by: SPECIALIST

## 2023-12-14 PROCEDURE — 71000033 HC RECOVERY, INTIAL HOUR: Performed by: SPECIALIST

## 2023-12-14 PROCEDURE — 37000008 HC ANESTHESIA 1ST 15 MINUTES: Performed by: SPECIALIST

## 2023-12-14 PROCEDURE — 27201423 OPTIME MED/SURG SUP & DEVICES STERILE SUPPLY: Performed by: SPECIALIST

## 2023-12-14 PROCEDURE — 25000003 PHARM REV CODE 250: Performed by: ANESTHESIOLOGY

## 2023-12-14 PROCEDURE — 37000009 HC ANESTHESIA EA ADD 15 MINS: Performed by: SPECIALIST

## 2023-12-14 PROCEDURE — 94799 UNLISTED PULMONARY SVC/PX: CPT

## 2023-12-14 PROCEDURE — 27200651 HC AIRWAY, LMA: Performed by: ANESTHESIOLOGY

## 2023-12-14 PROCEDURE — BT141ZZ FLUOROSCOPY OF KIDNEYS, URETERS AND BLADDER USING LOW OSMOLAR CONTRAST: ICD-10-PCS | Performed by: SPECIALIST

## 2023-12-14 PROCEDURE — 63600175 PHARM REV CODE 636 W HCPCS: Performed by: ANESTHESIOLOGY

## 2023-12-14 PROCEDURE — 63600175 PHARM REV CODE 636 W HCPCS: Performed by: INTERNAL MEDICINE

## 2023-12-14 PROCEDURE — 25000003 PHARM REV CODE 250: Performed by: NURSE PRACTITIONER

## 2023-12-14 PROCEDURE — 80053 COMPREHEN METABOLIC PANEL: CPT | Performed by: NURSE PRACTITIONER

## 2023-12-14 RX ORDER — HYDRALAZINE HYDROCHLORIDE 20 MG/ML
5 INJECTION INTRAMUSCULAR; INTRAVENOUS ONCE
Status: COMPLETED | OUTPATIENT
Start: 2023-12-14 | End: 2023-12-14

## 2023-12-14 RX ORDER — FAMOTIDINE 10 MG/ML
INJECTION INTRAVENOUS
Status: DISCONTINUED | OUTPATIENT
Start: 2023-12-14 | End: 2023-12-14

## 2023-12-14 RX ORDER — SODIUM CHLORIDE, SODIUM LACTATE, POTASSIUM CHLORIDE, CALCIUM CHLORIDE 600; 310; 30; 20 MG/100ML; MG/100ML; MG/100ML; MG/100ML
INJECTION, SOLUTION INTRAVENOUS CONTINUOUS PRN
Status: DISCONTINUED | OUTPATIENT
Start: 2023-12-14 | End: 2023-12-14

## 2023-12-14 RX ORDER — PROPOFOL 10 MG/ML
INJECTION, EMULSION INTRAVENOUS
Status: DISCONTINUED | OUTPATIENT
Start: 2023-12-14 | End: 2023-12-14

## 2023-12-14 RX ORDER — LIDOCAINE HYDROCHLORIDE 20 MG/ML
INJECTION INTRAVENOUS
Status: DISCONTINUED | OUTPATIENT
Start: 2023-12-14 | End: 2023-12-14

## 2023-12-14 RX ORDER — ONDANSETRON 2 MG/ML
INJECTION INTRAMUSCULAR; INTRAVENOUS
Status: DISCONTINUED | OUTPATIENT
Start: 2023-12-14 | End: 2023-12-14

## 2023-12-14 RX ORDER — METOPROLOL TARTRATE 1 MG/ML
5 INJECTION, SOLUTION INTRAVENOUS ONCE
Status: COMPLETED | OUTPATIENT
Start: 2023-12-14 | End: 2023-12-14

## 2023-12-14 RX ADMIN — MICAFUNGIN SODIUM 100 MG: 100 INJECTION, POWDER, LYOPHILIZED, FOR SOLUTION INTRAVENOUS at 10:12

## 2023-12-14 RX ADMIN — SODIUM CHLORIDE, SODIUM LACTATE, POTASSIUM CHLORIDE, AND CALCIUM CHLORIDE: .6; .31; .03; .02 INJECTION, SOLUTION INTRAVENOUS at 09:12

## 2023-12-14 RX ADMIN — IPRATROPIUM BROMIDE 0.5 MG: 0.5 SOLUTION RESPIRATORY (INHALATION) at 07:12

## 2023-12-14 RX ADMIN — HYDRALAZINE HYDROCHLORIDE 5 MG: 20 INJECTION INTRAMUSCULAR; INTRAVENOUS at 10:12

## 2023-12-14 RX ADMIN — LEVETIRACETAM 500 MG: 100 SOLUTION ORAL at 10:12

## 2023-12-14 RX ADMIN — MEROPENEM 1 G: 1 INJECTION, POWDER, FOR SOLUTION INTRAVENOUS at 04:12

## 2023-12-14 RX ADMIN — METOPROLOL TARTRATE 5 MG: 1 INJECTION, SOLUTION INTRAVENOUS at 10:12

## 2023-12-14 RX ADMIN — FAMOTIDINE 20 MG: 10 INJECTION, SOLUTION INTRAVENOUS at 09:12

## 2023-12-14 RX ADMIN — MEROPENEM 1 G: 1 INJECTION, POWDER, FOR SOLUTION INTRAVENOUS at 10:12

## 2023-12-14 RX ADMIN — METOPROLOL TARTRATE 25 MG: 25 TABLET, FILM COATED ORAL at 10:12

## 2023-12-14 RX ADMIN — MEROPENEM 1 G: 1 INJECTION, POWDER, FOR SOLUTION INTRAVENOUS at 02:12

## 2023-12-14 RX ADMIN — VANCOMYCIN HYDROCHLORIDE 1000 MG: 1 INJECTION, POWDER, LYOPHILIZED, FOR SOLUTION INTRAVENOUS at 05:12

## 2023-12-14 RX ADMIN — PROPOFOL 100 MG: 10 INJECTION, EMULSION INTRAVENOUS at 09:12

## 2023-12-14 RX ADMIN — ONDANSETRON 4 MG: 2 INJECTION INTRAMUSCULAR; INTRAVENOUS at 09:12

## 2023-12-14 RX ADMIN — IPRATROPIUM BROMIDE 0.5 MG: 0.5 SOLUTION RESPIRATORY (INHALATION) at 02:12

## 2023-12-14 RX ADMIN — LIDOCAINE HYDROCHLORIDE 50 MG: 20 INJECTION, SOLUTION INTRAVENOUS at 09:12

## 2023-12-14 RX ADMIN — APIXABAN 2.5 MG: 2.5 TABLET, FILM COATED ORAL at 10:12

## 2023-12-14 RX ADMIN — ATORVASTATIN CALCIUM 80 MG: 40 TABLET, FILM COATED ORAL at 10:12

## 2023-12-14 NOTE — ANESTHESIA PROCEDURE NOTES
Intubation    Date/Time: 12/14/2023 9:40 AM    Performed by: Bari Evans CRNA  Authorized by: Prashanth Dunbar MD    Intubation:     Induction:  Intravenous    Intubated:  Postinduction    Mask Ventilation:  Easy mask    Attempts:  1    Attempted By:  CRNA    Difficult Airway Encountered?: No      Complications:  None    Airway Device:  Supraglottic airway/LMA    Airway Device Size:  4.0    Style/Cuff Inflation:  Cuffed (inflated to minimal occlusive pressure)    Secured at:  The lips    Placement Verified By:  Capnometry    Complicating Factors:  None    Findings Post-Intubation:  BS equal bilateral and atraumatic/condition of teeth unchanged

## 2023-12-14 NOTE — CARE UPDATE
12/14/23 0729   Patient Assessment/Suction   Level of Consciousness (AVPU) alert   Respiratory Effort Normal;Unlabored   Expansion/Accessory Muscles/Retractions accessory muscle use;no use of accessory muscles;no retractions;expansion symmetric   All Lung Fields Breath Sounds clear   Rhythm/Pattern, Respiratory unlabored   Cough Frequency no cough   PRE-TX-O2   Device (Oxygen Therapy) room air   SpO2 97 %   Pulse Oximetry Type Intermittent   $ Pulse Oximetry - Multiple Charge Pulse Oximetry - Multiple   Pulse 95   Resp 16   Aerosol Therapy   $ Aerosol Therapy Charges Aerosol Treatment   Daily Review of Necessity (SVN) completed   Respiratory Treatment Status (SVN) given   Treatment Route (SVN) mask;oxygen   Patient Position (SVN) HOB elevated   Post Treatment Assessment (SVN) breath sounds unchanged   Breath Sounds Post-Respiratory Treatment   Throughout All Fields Post-Treatment no change   Post-treatment Heart Rate (beats/min) 92   Post-treatment Resp Rate (breaths/min) 16   Education   $ Education Other (see comment);Bronchodilator

## 2023-12-14 NOTE — PLAN OF CARE
Problem: Adult Inpatient Plan of Care  Goal: Plan of Care Review  Outcome: Ongoing, Progressing  Goal: Optimal Comfort and Wellbeing  Outcome: Ongoing, Progressing  Goal: Readiness for Transition of Care  Outcome: Ongoing, Progressing     Problem: Diabetes Comorbidity  Goal: Blood Glucose Level Within Targeted Range  Outcome: Ongoing, Progressing  Intervention: Monitor and Manage Glycemia  Flowsheets (Taken 12/14/2023 3697)  Glycemic Management: blood glucose monitored     Problem: Glycemic Control Impaired (Sepsis/Septic Shock)  Goal: Blood Glucose Level Within Desired Range  Outcome: Ongoing, Progressing  Intervention: Optimize Glycemic Control  Flowsheets (Taken 12/14/2023 6898)  Glycemic Management: blood glucose monitored

## 2023-12-14 NOTE — OP NOTE
Operative Note         SUMMARY     Surgery Date:  12/14/2023     Assistant:     Indications:  this 63 female with a history of stroke and aphasia  Patient with urinary tract infection  Has been treated with antibiotics with some improvement  Renal function improved dramatically  Patient had CT scan that indicates some tortuosity to the ureters  Mild hydronephrosis seen on CT scan  Ultrasound failed to show any hydronephrosis whatsoever  Here today for cystoscopic evaluation      Pre-op Diagnosis:   Urinary tract infection    Post-op Diagnosis:  Same    Procedure:  Cystoscopy with bilateral retrograde pyelography    Anesthesia: General    Description of Procedure:   Patient brought to cystoscopy suite.  Placed in the cystoscopy position.  Patient is prepped and draped.  Anesthesia is given.  We enter the urinary bladder with the 21 fr cystoscope.  Patient has some diffuse redness to the urinary bladder consistent with acute cystitis  No polyps or obvious bladder tumors were identified  I did not see any reason to do any biopsies today  I was able to identify the ureteral orifices both the which looked normal  We introduced contrast into the ureters bilaterally  There is some mild decrease in drainage bilaterally due to thickening of the bladder  The ureters and collecting systems opacify nicely  The the calices are delicate on both sides  There is no evidence of hydronephrosis seen on retrograde pyelography  This correlates with what I saw on ultrasound and computed tomography  I did not see any reason to leave stents in at this time  I did replace the Mathur catheter  She goes back to recovery in good condition    Findings/Key Components:          Estimated Blood Loss:    None

## 2023-12-14 NOTE — ANESTHESIA POSTPROCEDURE EVALUATION
Anesthesia Post Evaluation    Patient: Steff Johnson    Procedure(s) Performed: Procedure(s) (LRB):  CYSTOSCOPY, WITH RETROGRADE PYELOGRAM (N/A)  INSERTION, CATHETER (N/A)    Final Anesthesia Type: general      Patient location during evaluation: PACU  Patient participation: Yes- Able to Participate  Level of consciousness: awake and alert  Post-procedure vital signs: reviewed and stable  Pain management: adequate  Airway patency: patent    PONV status at discharge: No PONV  Anesthetic complications: no      Cardiovascular status: blood pressure returned to baseline and stable  Respiratory status: unassisted and room air  Hydration status: euvolemic  Follow-up not needed.              Vitals Value Taken Time   /84 12/14/23 1100   Temp 36.7 °C (98 °F) 12/14/23 1013   Pulse 76 12/14/23 1107   Resp 18 12/14/23 1107   SpO2 100 % 12/14/23 1107   Vitals shown include unvalidated device data.      Event Time   Out of Recovery 12/14/2023 11:08:59         Pain/Estefania Score: Pain Rating Prior to Med Admin: 0 (12/13/2023  9:00 AM)  Pain Rating Post Med Admin: 0 (12/13/2023 10:00 AM)  Estefania Score: 10 (12/14/2023 11:00 AM)

## 2023-12-14 NOTE — CARE UPDATE
12/13/23 1939   Patient Assessment/Suction   Level of Consciousness (AVPU) alert   Respiratory Effort Normal   Expansion/Accessory Muscles/Retractions no use of accessory muscles   All Lung Fields Breath Sounds clear   Rhythm/Pattern, Respiratory unlabored   Cough Frequency no cough   PRE-TX-O2   Device (Oxygen Therapy) room air   SpO2 97 %   Pulse Oximetry Type Intermittent   $ Pulse Oximetry - Multiple Charge Pulse Oximetry - Multiple   Pulse 88   Resp 19   Aerosol Therapy   $ Aerosol Therapy Charges Aerosol Treatment   Daily Review of Necessity (SVN) completed   Respiratory Treatment Status (SVN) given   Treatment Route (SVN) mask   Patient Position (SVN) semi-Berman's   Post Treatment Assessment (SVN) breath sounds unchanged;vital signs unchanged   Signs of Intolerance (SVN) none   Education   $ Education Bronchodilator;15 min   Respiratory Evaluation   $ Care Plan Tech Time 15 min

## 2023-12-14 NOTE — ANESTHESIA PREPROCEDURE EVALUATION
12/14/2023  Steff Johnson is a 63 y.o., female.      Patient Active Problem List   Diagnosis    Hypertension associated with diabetes    COPD (chronic obstructive pulmonary disease)    Hyperglycemia due to type 2 diabetes mellitus    Proteinuria    Complete tear of left rotator cuff    Left shoulder pain    Shoulder stiffness, left    Shoulder weakness    Type 2 diabetes mellitus with diabetic nephropathy, HbA1c 8.9%    Mixed hyperlipidemia    Type 2 diabetes mellitus with both eyes affected by moderate nonproliferative retinopathy and macular edema, with long-term current use of insulin    CVA (cerebral vascular accident)    H/o Cranial nerve III palsy, left    Gait abnormality    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    Vitamin D deficiency    Acute right-sided weakness    Frequent falls    COVID-19 infection    Hypertensive emergency    COURTNEY (acute kidney injury)    Osteomyelitis of finger of left hand    Paronychia of finger, left    Ocular hypertension, bilateral    Combined forms of age-related cataract, bilateral    Hypertensive crisis    History of stroke    Cognitive communication deficit    Aphasia    Right sided weakness    At risk for falling    Impaired functional mobility, balance, gait, and endurance    Decreased strength    Decreased range of motion    Decreased independence with activities of daily living    Impaired functional mobility, balance, and endurance    Alteration in instrumental activities of daily living (IADL)    Dysarthria    Dysphonia    Severe sepsis    Thrombocytosis    Microcytic anemia    Pressure injury of sacral region, unstageable    UTI (urinary tract infection)    Hyperkalemia    Leucocytosis    Acute respiratory failure with hypoxia    Bacteremia due to Proteus species    Bedridden    Blisters of multiple sites    Chronic indwelling Mathur catheter    Cognitive  impairment    Hydronephrosis    Hypoxia    Pressure injury of sacral region, stage 3    Aspiration pneumonia of right lung due to vomit    ESBL (extended spectrum beta-lactamase) producing bacteria infection       Past Surgical History:   Procedure Laterality Date    COLONOSCOPY N/A 4/11/2017    Procedure: COLONOSCOPY;  Surgeon: Jared Antonio MD;  Location: [facility];  Service: Endoscopy;  Laterality: N/A;    ECHOCARDIOGRAM,TRANSESOPHAGEAL N/A 12/12/2023    Procedure: Transesophageal echo (GT) intra-procedure log documentation;  Surgeon: Antoine Rivera MD;  Location: [facility] CATH/EP LAB;  Service: Cardiology;  Laterality: N/A;    HYSTERECTOMY      OOPHORECTOMY      SHOULDER SURGERY      R    TRANSESOPHAGEAL ECHOCARDIOGRAM WITH POSSIBLE CARDIOVERSION (GT W/ POSS CARDIOVERSION) N/A 5/4/2023    Procedure: Transesophageal echo (GT) intra-procedure log documentation;  Surgeon: Blade Phillip MD;  Location: [facility] CATH/EP LAB;  Service: Cardiology;  Laterality: N/A;        Tobacco Use:  The patient  reports that she has never smoked. She has never used smokeless tobacco.     Results for orders placed or performed during the hospital encounter of 12/05/23   EKG 12-LEAD    Collection Time: 12/11/23  4:19 PM    Narrative    Test Reason : I48.91,I48.92,    Vent. Rate : 081 BPM     Atrial Rate : 081 BPM     P-R Int : 144 ms          QRS Dur : 068 ms      QT Int : 404 ms       P-R-T Axes : 051 026 054 degrees     QTc Int : 469 ms    Normal sinus rhythm  Normal ECG  When compared with ECG of 05-DEC-2023 19:35,  No significant change was found    Referred By: [email or web address]   SELF           Confirmed By:         Imaging Results              US Retroperitoneal Complete (Final result)  Result time 12/06/23 02:15:33      Final result by Brooke Jaimes MD (12/06/23 02:15:33)                   Narrative:    EXAM:  US Retroperitoneal Complete    CLINICAL HISTORY:  The patient is 63 years old and is Female;  COURTNEY    TECHNIQUE:  Real-time complete ultrasound of the retroperitoneum with image documentation.    COMPARISON:  Right upper quadrant ultrasound dated 10/24/2023, CT scan abdomen and pelvis dated 10/24/2023    FINDINGS:  LIMITATIONS:  Evaluation limited due to patient condition and positioning.  RIGHT KIDNEY:  The right kidney measures 9.2 cm in length.  Probable mild right hydronephrosis.  No stones.  LEFT KIDNEY:  Unremarkable.  No stones.  No hydronephrosis.  The left kidney measures 9.2 cm in length.  BLADDER:  Mathur catheter in decompressed urinary bladder.    IMPRESSION:  Limited evaluation. Probable mild right hydronephrosis.    Electronically signed by:  Brooke Jaimes MD  12/06/2023 02:15 AM CST Workstation: [id number]                                     X-Ray Chest AP Portable (Final result)  Result time 12/06/23 07:10:32      Final result by Tammy Duncan MD (12/06/23 07:10:32)                   Narrative:    Portable chest x-ray at 7:38 PM is compared to prior study 10/24/2023    Clinical history is hematuria    The cardiomediastinal silhouette is normal in size. There are no confluent infiltrates or pleural effusions. There is linear atelectasis or scarring in the left midlung. There are mild degenerative changes of both shoulders.    There is a feeding tube overlying the stomach.    IMPRESSION: No acute pulmonary process    Electronically signed by:  Tammy Duncan MD  12/06/2023 07:10 AM CST Workstation: [id number]                                     Lab Results   Component Value Date    WBC 18.11 (H) 12/14/2023    HGB 7.0 (L) 12/14/2023    HCT 21.2 (L) 12/14/2023    MCV 76 (L) 12/14/2023     (H) 12/14/2023     BMP  Lab Results   Component Value Date     12/14/2023    K 3.8 12/14/2023     12/14/2023    CO2 22 (L) 12/14/2023    BUN 13 12/14/2023    CREATININE 0.6 12/14/2023    CALCIUM 8.4 (L) 12/14/2023    ANIONGAP 9 12/14/2023     12/14/2023    GLU 72 12/13/2023      (H) 12/12/2023       Results for orders placed during the hospital encounter of 12/05/23    Echo    Interpretation Summary    Left Ventricle: The left ventricle is normal in size. Normal wall thickness. Normal wall motion. There is normal systolic function with a visually estimated ejection fraction of 60 - 65%. There is normal diastolic function.    Right Ventricle: Normal right ventricular cavity size. Wall thickness is normal. Right ventricle wall motion  is normal. Systolic function is normal.    IVC/SVC: Normal venous pressure at 3 mmHg.            Pre-op Assessment    I have reviewed the Patient Summary Reports.     I have reviewed the Nursing Notes. I have reviewed the NPO Status.   I have reviewed the Medications.     Review of Systems  Anesthesia Hx:  No problems with previous Anesthesia             Denies Family Hx of Anesthesia complications.    Denies Personal Hx of Anesthesia complications.                    Social:  Non-Smoker       Hematology/Oncology:    Oncology Normal    -- Anemia:                                  EENT/Dental:  EENT/Dental Normal           Cardiovascular:     Hypertension              ECG has been reviewed.                          Pulmonary:  Pneumonia (poss aspiration pneumonia prior to admission) COPD, moderate                     Renal/:  Chronic Renal Disease                Hepatic/GI:  Hepatic/GI Normal                 Musculoskeletal:  Arthritis               Neurological:   CVA, residual symptoms        Hx Right side weakness  Currently bed-bound with expressive aphasia, cognitive impairment                                Endocrine:  Diabetes, type 2           Psych:  Psychiatric Normal                    Physical Exam  General: Well nourished    Airway:  Mallampati: II   Mouth Opening: Normal  TM Distance: Normal  Tongue: Normal  Neck ROM: Normal ROM    Dental:  Intact    Chest/Lungs:  Clear to auscultation    Heart:  Rate: Normal  Rhythm: Regular  Rhythm  Sounds: Normal        Anesthesia Plan  Type of Anesthesia, risks & benefits discussed:    Anesthesia Type: Gen Supraglottic Airway  Intra-op Monitoring Plan: Standard ASA Monitors  Post Op Pain Control Plan:   (medical reason for not using multimodal pain management)  Informed Consent: Informed consent signed with the Patient and all parties understand the risks and agree with anesthesia plan.  All questions answered.   ASA Score: 3 Emergent  Anesthesia Plan Notes:     General LMA OK  Zofran Pepcid      Ready For Surgery From Anesthesia Perspective.     .

## 2023-12-14 NOTE — CONSULTS
63-year-old female  With persistent bacteriuria  Gram-negative sepsis    Had CT scan indicating possible hydronephrosis    Renal ultrasound yesterday indicating hydronephrosis has resolved    Will plan for cystoscopy with retrograde pyelograms today

## 2023-12-14 NOTE — TRANSFER OF CARE
"Anesthesia Transfer of Care Note    Patient: Steff Johnson    Procedure(s) Performed: Procedure(s) (LRB):  CYSTOSCOPY, WITH RETROGRADE PYELOGRAM AND URETERAL STENT INSERTION (Bilateral)    Patient location: PACU    Anesthesia Type: general    Transport from OR: Transported from OR on room air with adequate spontaneous ventilation    Post pain: adequate analgesia    Post assessment: no apparent anesthetic complications and tolerated procedure well    Post vital signs: stable    Level of consciousness: awake    Nausea/Vomiting: no nausea/vomiting    Complications: none    Transfer of care protocol was followed      Last vitals: Visit Vitals  BP (!) 157/86   Pulse 97   Temp 37.2 °C (99 °F)   Resp 16   Ht 4' 11" (1.499 m)   Wt 61.8 kg (136 lb 4.3 oz)   SpO2 100%   BMI 27.52 kg/m²     "

## 2023-12-14 NOTE — PROGRESS NOTES
VANCOMYCIN PHARMACOKINETIC NOTE:  Vancomycin Day #5    Objective:    63 y.o., female, Actual Body Weight = 61.8 kg (136 lb 4.3 oz)    Diagnosis/Indication for Vancomycin:  Pneumonia and Sepsis   Desired Vancomycin Peak:  30-35 mcg/ml; Desired Trough: 10-15 mcg/ml    Diagnosis/Indication for Vancomycin:  Bacteremia   Desired Vancomycin Peak:  35-40 mcg/ml; Desired Trough: 15-20 mcg/ml    Current Vancomycin Regimen:  1000mg IV every 24 hours    Antibiotics (From admission, onward)      Start     Stop Route Frequency Ordered    12/11/23 1800  vancomycin in dextrose 5 % 1 gram/250 mL IVPB 1,000 mg         -- IV Every 24 hours (non-standard times) 12/11/23 1743    12/09/23 1607  vancomycin - pharmacy to dose  (vancomycin IVPB (PEDS and ADULTS))        See Hyperspace for full Linked Orders Report.    -- IV pharmacy to manage frequency 12/09/23 1507    12/08/23 2100  meropenem 1 g in sodium chloride 0.9 % 100 mL IVPB (ready to mix system)        Note to Pharmacy: Original order: meropenem 1 g Q 8 hours    -- IV Every 8 hours (non-standard times) 12/08/23 1627    12/06/23 0900  mupirocin 2 % ointment         12/11/23 0859 Nasl 2 times daily 12/05/23 2308             The patient has the following labs:     12/13/2023 Estimated Creatinine Clearance: 95.6 mL/min (based on SCr of 0.5 mg/dL). Lab Results   Component Value Date    BUN 12 12/13/2023     Lab Results   Component Value Date    WBC 21.62 (H) 12/12/2023        Vancomycin Trough:  16.7 mcg/mL collected after Dose #6 (drawn correctly).    Microbiology Results (last 7 days)       Procedure Component Value Units Date/Time    Blood culture [[id number]] Collected: 12/11/23 0937    Order Status: Completed Specimen: Blood Updated: 12/13/23 1232     Blood Culture, Routine No Growth to date      No Growth to date      No Growth to date    Blood culture [[id number]] Collected: 12/11/23 0628    Order Status: Completed Specimen: Blood Updated: 12/13/23 1033     Blood Culture,  Routine No Growth to date      No Growth to date      No Growth to date    Blood culture [[id number]] Collected: 12/12/23 0853    Order Status: Completed Specimen: Blood Updated: 12/13/23 1033     Blood Culture, Routine No Growth to date      No Growth to date    Narrative:      Collection has been rescheduled by LND at 12/12/2023 07:02 Reason:   Unable to collect  Collection has been rescheduled by LND at 12/12/2023 07:02 Reason:   Unable to collect    Blood culture [[id number]] Collected: 12/12/23 0854    Order Status: Completed Specimen: Blood Updated: 12/13/23 1033     Blood Culture, Routine No Growth to date      No Growth to date    Narrative:      Collection has been rescheduled by LND at 12/12/2023 07:01 Reason:   Unable to collect  Collection has been rescheduled by LND at 12/12/2023 07:01 Reason:   Unable to collect    Blood culture [[id number]] Collected: 12/12/23 0853    Order Status: Completed Specimen: Blood Updated: 12/13/23 1033     Blood Culture, Routine No Growth to date      No Growth to date    Narrative:      Collection has been rescheduled by LND at 12/12/2023 07:02 Reason:   Unable to collect  Collection has been rescheduled by LND at 12/12/2023 07:02 Reason:   Unable to collect    Blood culture [[id number]] Collected: 12/10/23 0626    Order Status: Completed Specimen: Blood Updated: 12/13/23 0832     Blood Culture, Routine No Growth to date      No Growth to date      No Growth to date      No Growth to date    Urine culture [[id number]] Collected: 12/11/23 0938    Order Status: Completed Specimen: Urine Updated: 12/13/23 0725     Urine Culture, Routine No growth to date    Narrative:      Specimen Source->Urine    Blood culture [[id number]]  (Abnormal) Collected: 12/08/23 1208    Order Status: Completed Specimen: Blood Updated: 12/12/23 0641     Blood Culture, Routine Gram stain aer bottle: budding yeast      Results called to and read back by: Mary Braun LPN on [facility],      12/10/2023  17:29      AUBREY GLABRATA    Blood culture [[id number]]  (Abnormal)  (Susceptibility) Collected: 12/08/23 1208    Order Status: Completed Specimen: Blood Updated: 12/11/23 0639     Blood Culture, Routine Gram stain aer bottle: Gram positive cocci      Results called to and read back by: Priscilla Boyce RN on [address] [place] [address],      12/09/2023 14:53 vcb      ENTEROCOCCUS FAECALIS    Rapid Organism ID by PCR (from Blood culture) [[id number]]  (Abnormal) Collected: 12/08/23 1208    Order Status: Completed Updated: 12/10/23 1853     Enterococcus faecalis Not Detected     Enterococcus faecium Not Detected     Listeria monocytogenes Not Detected     Staphylococcus spp. Not Detected     Staphylococcus aureus Not Detected     Staphylococcus epidermidis Not Detected     Staphylococcus lugdunensis Not Detected     Streptococcus species Not Detected     Streptococcus agalactiae Not Detected     Streptococcus pneumoniae Not Detected     Streptococcus pyogenes Not Detected     Acinetobacter calcoaceticus/baumannii complex Not Detected     Bacteroides fragilis Not Detected     Enterobacterales Not Detected     Enterobacter cloacae complex Not Detected     Escherichia coli Not Detected     Klebsiella aerogenes Not Detected     Klebsiella oxytoca Not Detected     Klebsiella pneumoniae group Not Detected     Proteus Not Detected     Salmonella sp Not Detected     Serratia marcescens Not Detected     Haemophilus influenzae Not Detected     Neisseria meningtidis Not Detected     Pseudomonas aeruginosa Not Detected     Stenotrophomonas maltophilia Not Detected     Candida albicans Not Detected     Candida auris Not Detected     Aubrey glabrata Detected     Aubrey krusei Not Detected     Candida parapsilosis Not Detected     Candida tropicalis Not Detected     Cryptococcus neoformans/gattii Not Detected     CTX-M (ESBL ) Test not applicable     IMP (Carbapenem resistant) Test not applicable     KPC resistance gene (Carbapenem resistant)  Test not applicable     mcr-1  Test not applicable     mec A/C  Test not applicable     mec A/C and MREJ (MRSA) gene Test not applicable     NDM (Carbapenem resistant) Test not applicable     OXA-48-like (Carbapenem resistant) Test not applicable     van A/B (VRE gene) Test not applicable     VIM (Carbapenem resistant) Test not applicable    Blood culture [[id number]]  (Abnormal) Collected: 12/06/23 1521    Order Status: Completed Specimen: Blood from Peripheral, Right Arm Updated: 12/10/23 0745     Blood Culture, Routine Gram stain aer bottle: Gram negative rods      Positive results previously called 12/09/2023  00:48 KS3      PROTEUS MIRABILIS ESBL  For susceptibility see order #[id number]  Known ESBL patient      Narrative:      Collection has been rescheduled by [facility] at 12/06/2023 13:17 Reason:   Duplicate order waiting for dr lott to cancel. Spoke to rob GARCIA.  Collection has been rescheduled by [facility] at 12/06/2023 13:17 Reason:   Duplicate order waiting for dr lott to cancel. Spoke to rob GARCIA.    Rapid Organism ID by PCR (from Blood culture) [[id number]]  (Abnormal) Collected: 12/08/23 1208    Order Status: Completed Updated: 12/09/23 1551     Enterococcus faecalis Detected     Enterococcus faecium Not Detected     Listeria monocytogenes Not Detected     Staphylococcus spp. Not Detected     Staphylococcus aureus Not Detected     Staphylococcus epidermidis Not Detected     Staphylococcus lugdunensis Not Detected     Streptococcus species Not Detected     Streptococcus agalactiae Not Detected     Streptococcus pneumoniae Not Detected     Streptococcus pyogenes Not Detected     Acinetobacter calcoaceticus/baumannii complex Not Detected     Bacteroides fragilis Not Detected     Enterobacterales Not Detected     Enterobacter cloacae complex Not Detected     Escherichia coli Not Detected     Klebsiella aerogenes Not Detected     Klebsiella oxytoca Not Detected     Klebsiella pneumoniae group Not Detected     Proteus  Not Detected     Salmonella sp Not Detected     Serratia marcescens Not Detected     Haemophilus influenzae Not Detected     Neisseria meningtidis Not Detected     Pseudomonas aeruginosa Not Detected     Stenotrophomonas maltophilia Not Detected     Candida albicans Not Detected     Candida auris Not Detected     Candida glabrata Not Detected     Candida krusei Not Detected     Candida parapsilosis Not Detected     Candida tropicalis Not Detected     Cryptococcus neoformans/gattii Not Detected     CTX-M (ESBL ) Test not applicable     IMP (Carbapenem resistant) Test not applicable     KPC resistance gene (Carbapenem resistant) Test not applicable     mcr-1  Test not applicable     mec A/C  Test not applicable     mec A/C and MREJ (MRSA) gene Test not applicable     NDM (Carbapenem resistant) Test not applicable     OXA-48-like (Carbapenem resistant) Test not applicable     van A/B (VRE gene) Not Detected     VIM (Carbapenem resistant) Test not applicable    Urine culture [[id number]]  (Abnormal)  (Susceptibility) Collected: 12/05/23 2003    Order Status: Completed Specimen: Urine Updated: 12/08/23 0757     Urine Culture, Routine PROTEUS MIRABILIS ESBL  >100,000 cfu/ml  Known ESBL patient      Narrative:      Specimen Source->Urine    Blood culture [[id number]]  (Abnormal) Collected: 12/06/23 1522    Order Status: Completed Specimen: Blood from Antecubital, Right Hand Updated: 12/08/23 0738     Blood Culture, Routine Gram stain aer bottle: Gram negative rods      Positive results previously called 12/07/2023  18:19 KS3      PROTEUS MIRABILIS ESBL  Known ESBL patient  For susceptibility see order #[id number]      Narrative:      Collection has been rescheduled by [facility] at 12/06/2023 13:17 Reason:   Duplicate order waiting for dr lott to cancel. Spoke to rob GARCIA.  Collection has been rescheduled by [facility] at 12/06/2023 13:17 Reason:   Duplicate order waiting for dr lott to cancel. Spoke to rob GARCIA.    Blood  culture x two cultures. Draw prior to antibiotics. [[id number]]  (Abnormal) Collected: 12/05/23 2017    Order Status: Completed Specimen: Blood Updated: 12/08/23 0736     Blood Culture, Routine Gram stain lisa bottle: Gram negative rods      Gram stain aer bottle: Gram negative rods      Results called to and read back by:Natalie Dugan RN  12/06/2023      10:39 JBM      Positive results previously called      PROTEUS MIRABILIS ESBL  Known ESBL patient  For susceptibility see order #[id number]      Narrative:      Aerobic and anaerobic  Collection has been rescheduled by JS at 12/05/2023 19:29 Reason:   Meline,ER tech said to come back in a minute. Patient needs to be   cleaned.  Collection has been rescheduled by JS at 12/05/2023 20:20 Reason:   Done  Collection has been rescheduled by JS at 12/05/2023 19:29 Reason:   Meline,ER tech said to come back in a minute. Patient needs to be   cleaned.  Collection has been rescheduled by [facility] at 12/05/2023 20:20 Reason:   Done    Blood culture x two cultures. Draw prior to antibiotics. [[id number]]  (Abnormal)  (Susceptibility) Collected: 12/05/23 2007    Order Status: Completed Specimen: Blood Updated: 12/08/23 0735     Blood Culture, Routine Gram stain lisa bottle: Gram negative rods      Gram stain aer bottle: Gram negative rods      Results called to and read back by:Natalie Dugan RN  12/06/2023      10:39 JBM      Positive results previously called      PROTEUS MIRABILIS ESBL  Known ESBL patient      Narrative:      Aerobic and anaerobic  Collection has been rescheduled by [facility] at 12/05/2023 19:29 Reason:   Meline,ER tech said to come back in a minute. Patient needs to be   cleaned.  Collection has been rescheduled by JS at 12/05/2023 20:20 Reason:   Done  Collection has been rescheduled by JS at 12/05/2023 19:29 Reason:   Meline,ER tech said to come back in a minute. Patient needs to be   cleaned.  Collection has been rescheduled by JS at 12/05/2023  20:20 Reason:   Done    MRSA Screen by PCR [[id number]] Collected: 12/06/23 1645    Order Status: Completed Specimen: Nasopharyngeal Swab from Nasal Updated: 12/06/23 2239     MRSA SCREEN BY PCR Negative             Assessment:  Renal function is: stable  Vancomycin trough is within goal range.    Plan:  No change to current vancomycin regimen  Will obtain vancomycin trough after 4 more dose(s), prior to dose due 12/17 at 17:00    Pharmacy will continue to manage vancomycin therapy and adjust regimen as necessary.    Thank you for allowing us to participate in this patient's care.     Stephon Cerda 12/13/2023 7:24 PM  Department of Pharmacy  Ext [phone]

## 2023-12-15 LAB
ALBUMIN SERPL BCP-MCNC: 2.9 G/DL (ref 3.5–5.2)
ALP SERPL-CCNC: 181 U/L (ref 55–135)
ALT SERPL W/O P-5'-P-CCNC: 37 U/L (ref 10–44)
ANION GAP SERPL CALC-SCNC: 9 MMOL/L (ref 8–16)
ANISOCYTOSIS BLD QL SMEAR: ABNORMAL
AST SERPL-CCNC: 19 U/L (ref 10–40)
BACTERIA BLD CULT: ABNORMAL
BACTERIA BLD CULT: NORMAL
BASOPHILS # BLD AUTO: 0.12 K/UL (ref 0–0.2)
BASOPHILS NFR BLD: 0.6 % (ref 0–1.9)
BILIRUB SERPL-MCNC: 0.5 MG/DL (ref 0.1–1)
BUN SERPL-MCNC: 12 MG/DL (ref 8–23)
CALCIUM SERPL-MCNC: 8.8 MG/DL (ref 8.7–10.5)
CHLORIDE SERPL-SCNC: 105 MMOL/L (ref 95–110)
CK SERPL-CCNC: 40 U/L (ref 20–180)
CO2 SERPL-SCNC: 24 MMOL/L (ref 23–29)
CREAT SERPL-MCNC: 0.5 MG/DL (ref 0.5–1.4)
DIFFERENTIAL METHOD BLD: ABNORMAL
EOSINOPHIL # BLD AUTO: 0.5 K/UL (ref 0–0.5)
EOSINOPHIL NFR BLD: 2.4 % (ref 0–8)
ERYTHROCYTE [DISTWIDTH] IN BLOOD BY AUTOMATED COUNT: 23.2 % (ref 11.5–14.5)
EST. GFR  (NO RACE VARIABLE): >60 ML/MIN/1.73 M^2
FERRITIN SERPL-MCNC: 886.8 NG/ML (ref 20–300)
GLUCOSE SERPL-MCNC: 132 MG/DL (ref 70–110)
GLUCOSE SERPL-MCNC: 165 MG/DL (ref 70–110)
GLUCOSE SERPL-MCNC: 170 MG/DL (ref 70–110)
GLUCOSE SERPL-MCNC: 175 MG/DL (ref 70–110)
GLUCOSE SERPL-MCNC: 183 MG/DL (ref 70–110)
GLUCOSE SERPL-MCNC: 82 MG/DL (ref 70–110)
HCT VFR BLD AUTO: 22.5 % (ref 37–48.5)
HGB BLD-MCNC: 7.3 G/DL (ref 12–16)
HYPOCHROMIA BLD QL SMEAR: ABNORMAL
IMM GRANULOCYTES # BLD AUTO: 0.6 K/UL (ref 0–0.04)
IMM GRANULOCYTES NFR BLD AUTO: 3.2 % (ref 0–0.5)
IRON SERPL-MCNC: 16 UG/DL (ref 30–160)
LYMPHOCYTES # BLD AUTO: 1.7 K/UL (ref 1–4.8)
LYMPHOCYTES NFR BLD: 9.1 % (ref 18–48)
MAGNESIUM SERPL-MCNC: 2 MG/DL (ref 1.6–2.6)
MCH RBC QN AUTO: 24.9 PG (ref 27–31)
MCHC RBC AUTO-ENTMCNC: 32.4 G/DL (ref 32–36)
MCV RBC AUTO: 77 FL (ref 82–98)
MONOCYTES # BLD AUTO: 1.4 K/UL (ref 0.3–1)
MONOCYTES NFR BLD: 7.4 % (ref 4–15)
NEUTROPHILS # BLD AUTO: 14.6 K/UL (ref 1.8–7.7)
NEUTROPHILS NFR BLD: 77.3 % (ref 38–73)
NRBC BLD-RTO: 0 /100 WBC
OVALOCYTES BLD QL SMEAR: ABNORMAL
PLATELET # BLD AUTO: 1067 K/UL (ref 150–450)
PLATELET BLD QL SMEAR: ABNORMAL
PMV BLD AUTO: 10 FL (ref 9.2–12.9)
POIKILOCYTOSIS BLD QL SMEAR: SLIGHT
POLYCHROMASIA BLD QL SMEAR: ABNORMAL
POTASSIUM SERPL-SCNC: 3.9 MMOL/L (ref 3.5–5.1)
PROT SERPL-MCNC: 7 G/DL (ref 6–8.4)
RBC # BLD AUTO: 2.93 M/UL (ref 4–5.4)
SATURATED IRON: 7 % (ref 20–50)
SCHISTOCYTES BLD QL SMEAR: PRESENT
SODIUM SERPL-SCNC: 138 MMOL/L (ref 136–145)
TARGETS BLD QL SMEAR: ABNORMAL
TOTAL IRON BINDING CAPACITY: 214 UG/DL (ref 250–450)
TRANSFERRIN SERPL-MCNC: 153 MG/DL (ref 200–375)
WBC # BLD AUTO: 18.9 K/UL (ref 3.9–12.7)

## 2023-12-15 PROCEDURE — 99900031 HC PATIENT EDUCATION (STAT)

## 2023-12-15 PROCEDURE — 85025 COMPLETE CBC W/AUTO DIFF WBC: CPT | Performed by: STUDENT IN AN ORGANIZED HEALTH CARE EDUCATION/TRAINING PROGRAM

## 2023-12-15 PROCEDURE — 63600175 PHARM REV CODE 636 W HCPCS: Performed by: NURSE PRACTITIONER

## 2023-12-15 PROCEDURE — 83540 ASSAY OF IRON: CPT | Performed by: STUDENT IN AN ORGANIZED HEALTH CARE EDUCATION/TRAINING PROGRAM

## 2023-12-15 PROCEDURE — 25000003 PHARM REV CODE 250: Performed by: STUDENT IN AN ORGANIZED HEALTH CARE EDUCATION/TRAINING PROGRAM

## 2023-12-15 PROCEDURE — 36415 COLL VENOUS BLD VENIPUNCTURE: CPT | Performed by: STUDENT IN AN ORGANIZED HEALTH CARE EDUCATION/TRAINING PROGRAM

## 2023-12-15 PROCEDURE — 12000002 HC ACUTE/MED SURGE SEMI-PRIVATE ROOM

## 2023-12-15 PROCEDURE — 82550 ASSAY OF CK (CPK): CPT | Performed by: STUDENT IN AN ORGANIZED HEALTH CARE EDUCATION/TRAINING PROGRAM

## 2023-12-15 PROCEDURE — 25000003 PHARM REV CODE 250: Performed by: INTERNAL MEDICINE

## 2023-12-15 PROCEDURE — 25000003 PHARM REV CODE 250: Performed by: NURSE PRACTITIONER

## 2023-12-15 PROCEDURE — 99222 1ST HOSP IP/OBS MODERATE 55: CPT | Mod: ,,, | Performed by: INTERNAL MEDICINE

## 2023-12-15 PROCEDURE — 99233 SBSQ HOSP IP/OBS HIGH 50: CPT | Mod: FS,,, | Performed by: NURSE PRACTITIONER

## 2023-12-15 PROCEDURE — 99900035 HC TECH TIME PER 15 MIN (STAT)

## 2023-12-15 PROCEDURE — 25000242 PHARM REV CODE 250 ALT 637 W/ HCPCS: Performed by: STUDENT IN AN ORGANIZED HEALTH CARE EDUCATION/TRAINING PROGRAM

## 2023-12-15 PROCEDURE — 63600175 PHARM REV CODE 636 W HCPCS: Performed by: STUDENT IN AN ORGANIZED HEALTH CARE EDUCATION/TRAINING PROGRAM

## 2023-12-15 PROCEDURE — 94640 AIRWAY INHALATION TREATMENT: CPT

## 2023-12-15 PROCEDURE — 94761 N-INVAS EAR/PLS OXIMETRY MLT: CPT

## 2023-12-15 PROCEDURE — 83735 ASSAY OF MAGNESIUM: CPT | Performed by: STUDENT IN AN ORGANIZED HEALTH CARE EDUCATION/TRAINING PROGRAM

## 2023-12-15 PROCEDURE — 63600175 PHARM REV CODE 636 W HCPCS: Performed by: INTERNAL MEDICINE

## 2023-12-15 PROCEDURE — 82728 ASSAY OF FERRITIN: CPT | Performed by: STUDENT IN AN ORGANIZED HEALTH CARE EDUCATION/TRAINING PROGRAM

## 2023-12-15 PROCEDURE — 36415 COLL VENOUS BLD VENIPUNCTURE: CPT | Performed by: INTERNAL MEDICINE

## 2023-12-15 PROCEDURE — 80053 COMPREHEN METABOLIC PANEL: CPT | Performed by: NURSE PRACTITIONER

## 2023-12-15 RX ORDER — LOSARTAN POTASSIUM 50 MG/1
100 TABLET ORAL DAILY
Status: DISCONTINUED | OUTPATIENT
Start: 2023-12-15 | End: 2023-12-19 | Stop reason: HOSPADM

## 2023-12-15 RX ORDER — FERROUS SULFATE 300 MG/5ML
300 LIQUID (ML) ORAL DAILY
Status: DISCONTINUED | OUTPATIENT
Start: 2023-12-15 | End: 2023-12-19 | Stop reason: HOSPADM

## 2023-12-15 RX ADMIN — INSULIN ASPART 2 UNITS: 100 INJECTION, SOLUTION INTRAVENOUS; SUBCUTANEOUS at 12:12

## 2023-12-15 RX ADMIN — DAPTOMYCIN 500 MG: 500 INJECTION, POWDER, LYOPHILIZED, FOR SOLUTION INTRAVENOUS at 04:12

## 2023-12-15 RX ADMIN — METOPROLOL TARTRATE 25 MG: 25 TABLET, FILM COATED ORAL at 09:12

## 2023-12-15 RX ADMIN — IPRATROPIUM BROMIDE 0.5 MG: 0.5 SOLUTION RESPIRATORY (INHALATION) at 07:12

## 2023-12-15 RX ADMIN — LEVETIRACETAM 500 MG: 100 SOLUTION ORAL at 09:12

## 2023-12-15 RX ADMIN — MINERAL SUPPLEMENT IRON 300 MG / 5 ML STRENGTH LIQUID 100 PER BOX UNFLAVORED 300 MG: at 01:12

## 2023-12-15 RX ADMIN — INSULIN DETEMIR 35 UNITS: 100 INJECTION, SOLUTION SUBCUTANEOUS at 09:12

## 2023-12-15 RX ADMIN — MEROPENEM 1 G: 1 INJECTION, POWDER, FOR SOLUTION INTRAVENOUS at 11:12

## 2023-12-15 RX ADMIN — MICAFUNGIN SODIUM 100 MG: 100 INJECTION, POWDER, LYOPHILIZED, FOR SOLUTION INTRAVENOUS at 09:12

## 2023-12-15 RX ADMIN — ASPIRIN 81 MG 81 MG: 81 TABLET ORAL at 09:12

## 2023-12-15 RX ADMIN — MEROPENEM 1 G: 1 INJECTION, POWDER, FOR SOLUTION INTRAVENOUS at 05:12

## 2023-12-15 RX ADMIN — LOSARTAN POTASSIUM 100 MG: 50 TABLET, FILM COATED ORAL at 01:12

## 2023-12-15 RX ADMIN — AMLODIPINE BESYLATE 10 MG: 5 TABLET ORAL at 09:12

## 2023-12-15 RX ADMIN — MEROPENEM 1 G: 1 INJECTION, POWDER, FOR SOLUTION INTRAVENOUS at 01:12

## 2023-12-15 RX ADMIN — FAMOTIDINE 20 MG: 20 TABLET ORAL at 09:12

## 2023-12-15 RX ADMIN — APIXABAN 2.5 MG: 2.5 TABLET, FILM COATED ORAL at 09:12

## 2023-12-15 RX ADMIN — INSULIN ASPART 2 UNITS: 100 INJECTION, SOLUTION INTRAVENOUS; SUBCUTANEOUS at 07:12

## 2023-12-15 RX ADMIN — HYDRALAZINE HYDROCHLORIDE 10 MG: 20 INJECTION INTRAMUSCULAR; INTRAVENOUS at 03:12

## 2023-12-15 RX ADMIN — IPRATROPIUM BROMIDE 0.5 MG: 0.5 SOLUTION RESPIRATORY (INHALATION) at 02:12

## 2023-12-15 RX ADMIN — TAMSULOSIN HYDROCHLORIDE 0.4 MG: 0.4 CAPSULE ORAL at 09:12

## 2023-12-15 RX ADMIN — POLYETHYLENE GLYCOL 3350 17 G: 17 POWDER, FOR SOLUTION ORAL at 09:12

## 2023-12-15 NOTE — PLAN OF CARE
Reviewed in rounds and attempted to meet with pt but she was unable to participate in conversation (aphasia) and no family present.  Current list of medications sent to [facility] as pt was in their rehab.  Previously discussed with admissions and they can do q8 IV abx but unsure if they can provide quantity of current IV abx.  Message to ID regarding duration and they note 12/25/23 but may still change current recommendations.  May need to explore alternate placement such as LTACH to accommodate pt needs.  CM will continue to follow.       12/15/23 1346   Post-Acute Status   Post-Acute Authorization Placement   Post-Acute Placement Status Pending post-acute provider review/more information requested   Discharge Plan   Discharge Plan A Skilled Nursing Facility   Discharge Plan B Long-term acute care facility (LTAC)

## 2023-12-15 NOTE — ASSESSMENT & PLAN NOTE
Likely secondary to infection.  Bilateral upper and lower extremity ultrasounds negative for DVT.  Hematology consulted.  Patient had similar elevation and platelets in October during sepsis.  will evaluate for myeloproliferative state in order a JAK2 test per Hematology.  Continue Eliquis.

## 2023-12-15 NOTE — ASSESSMENT & PLAN NOTE
Patient with Hypoxic Respiratory failure which is Acute.  she is not on home oxygen. Supplemental oxygen was provided and noted-      .   Signs/symptoms of respiratory failure include- tachypnea and increased work of breathing. Contributing diagnoses includes - Aspiration Labs and images were reviewed. Patient Has not had a recent ABG. Will treat underlying causes and adjust management of respiratory failure as follows- abx, supplemental o2 as needed    Resolving

## 2023-12-15 NOTE — ASSESSMENT & PLAN NOTE
This patient does have evidence of infective focus  My overall impression is sepsis.  Source: Respiratory and Urinary Tract  Antibiotics given-   Antibiotics (72h ago, onward)      Start     Stop Route Frequency Ordered    12/11/23 1800  vancomycin in dextrose 5 % 1 gram/250 mL IVPB 1,000 mg         -- IV Every 24 hours (non-standard times) 12/11/23 1743    12/09/23 1607  vancomycin - pharmacy to dose  (vancomycin IVPB (PEDS and ADULTS))        See Hyperspace for full Linked Orders Report.    -- IV pharmacy to manage frequency 12/09/23 1507    12/08/23 2100  meropenem 1 g in sodium chloride 0.9 % 100 mL IVPB (ready to mix system)        Note to Pharmacy: Original order: meropenem 1 g Q 8 hours    -- IV Every 8 hours (non-standard times) 12/08/23 1627          Infectious disease consulted, continue vancomycin, meropenem, micafungin.  Blood cultures since 12/10 have been negative.  Rene negative for vegetation/endocarditis.  Per Infectious Disease follow up CT chest for right hilar lymphadenopathy resolution.

## 2023-12-15 NOTE — PROGRESS NOTES
[facility] Medicine  Progress Note    Patient Name: Steff Johnson  MRN: [id number]  Patient Class: IP- Inpatient   Admission Date: 12/5/2023  Length of Stay: 9 days  Attending Physician: Amber Fernández MD  Primary Care Provider: Cristina Ren MD        Subjective:     Principal Problem:Severe sepsis        HPI:  Patient is a 63-year-old female with history of severe CVA with aphasia, upper and lower extremity weakness, bed-bound, cognitive impairment, hypertension, hyperlipidemia, type 2 diabetes, COPD presents to the emergency room for concerns of hematuria and hypoxia.  Patient nonverbal at baseline, history provided by ED physician.  Tried to contact daughter, was unsuccessful.  Per ED, patient presented from [facility], staff noticed bloody urine output from her chronic indwelling Mathur catheter as well as hypoxia.  Non-rebreather was placed on patient and this improved her oxygenation saturation.  Brought to ED by EMS.  Per ED physician patient did have episode of emesis, likely aspirating.    On admission temp 100.4, heart rate 111, O2 89%, WBC 40.96, hemoglobin 9.6, potassium 5.2, creatinine 1.7, lactic 3.8, procalcitonin 133.671.  Review of CXR did not show any obvious consolidations.  UA consistent with UTI.    Overview/Hospital Course:  Patient admitted for hematuria and hypoxia.  Was started on empiric antibiotics.  Blood cultures grew Proteus ESBL, Enterococcus, Candida glabrata.  Infectious Disease consulted.  Wound care consulted for chronic sacral wound.  Patient started on vancomycin, meropenem, micafungin.  Given positive blood cultures justyna was done which was negative for endocarditis/vegetation.  CT scan showed fluid collection in the gallbladder fossa, interventional Radiology consulted recommended no intervention at this time since this is stable compared to prior imaging.  Urology was consulted given concern of hydronephrosis on CT scan although repeat  retroperitoneal ultrasound showed no hydro nephrosis.  Cystoscopy done on 12/14/2023 showed no hydronephrosis, no intervention was done.  Repeat blood cultures on 12/10 have been negative so far.    Interval History:  WBC downtrending, 18.11.  Platelets 998.  Patient awake, nonverbal.    Review of Systems   Unable to perform ROS: Patient nonverbal     Objective:     Vital Signs (Most Recent):  Temp: 99.6 °F (37.6 °C) (12/14/23 1650)  Pulse: 89 (12/14/23 1650)  Resp: 18 (12/14/23 1650)  BP: (!) 148/84 (12/14/23 1650)  SpO2: 97 % (12/14/23 1650) Vital Signs (24h Range):  Temp:  [97.6 °F (36.4 °C)-99.6 °F (37.6 °C)] 99.6 °F (37.6 °C)  Pulse:  [76-97] 89  Resp:  [16-25] 18  SpO2:  [95 %-100 %] 97 %  BP: (148-200)/(71-92) 148/84     Weight: 61.8 kg (136 lb 4.3 oz)  Body mass index is 27.52 kg/m².    Intake/Output Summary (Last 24 hours) at 12/14/2023 1813  Last data filed at 12/14/2023 1012  Gross per 24 hour   Intake 600 ml   Output 200 ml   Net 400 ml         Physical Exam  Vitals reviewed.   Constitutional:       Appearance: She is ill-appearing.   HENT:      Head: Normocephalic and atraumatic.   Cardiovascular:      Rate and Rhythm: Normal rate.   Pulmonary:      Effort: No respiratory distress.      Breath sounds: No wheezing.   Abdominal:      General: There is no distension.      Tenderness: There is no abdominal tenderness.   Neurological:      Mental Status: Mental status is at baseline.      Comments: Nonverbal, minimally responsive, opens eyes, does not track.             Significant Labs: All pertinent labs within the past 24 hours have been reviewed.  CBC:   Recent Labs   Lab 12/14/23  0500   WBC 18.11*   HGB 7.0*   HCT 21.2*   *     CMP:   Recent Labs   Lab 12/13/23  0606 12/14/23  0500    137   K 4.9 3.8    106   CO2 19* 22*   GLU 72 105   BUN 12 13   CREATININE 0.5 0.6   CALCIUM 8.5* 8.4*   PROT 6.8 6.9   ALBUMIN 3.1* 3.0*   BILITOT 0.6 0.5   ALKPHOS 199* 182*   AST 38 22   ALT 60* 46*    ANIONGAP 11 9       Significant Imaging: I have reviewed all pertinent imaging results/findings within the past 24 hours.    Assessment/Plan:      * Severe sepsis  This patient does have evidence of infective focus  My overall impression is sepsis.  Source: Respiratory and Urinary Tract  Antibiotics given-   Antibiotics (72h ago, onward)      Start     Stop Route Frequency Ordered    12/11/23 1800  vancomycin in dextrose 5 % 1 gram/250 mL IVPB 1,000 mg         -- IV Every 24 hours (non-standard times) 12/11/23 1743    12/09/23 1607  vancomycin - pharmacy to dose  (vancomycin IVPB (PEDS and ADULTS))        See hospitalspace for full Linked Orders Report.    -- IV pharmacy to manage frequency 12/09/23 1507    12/08/23 2100  meropenem 1 g in sodium chloride 0.9 % 100 mL IVPB (ready to mix system)        Note to Pharmacy: Original order: meropenem 1 g Q 8 hours    -- IV Every 8 hours (non-standard times) 12/08/23 1627          Infectious disease consulted, continue vancomycin, meropenem, micafungin.  Blood cultures since 12/10 have been negative.  Rene negative for vegetation/endocarditis.  Per Infectious Disease follow up CT chest for right hilar lymphadenopathy resolution.    Hydronephrosis    Status post cystoscopy on 12/14/2023.  No hydronephrosis noted, no need for stents.    Chronic indwelling Mathur catheter  Noted, changed in the ED      Blisters of multiple sites  Wound care consulted       Bedridden  Noted        Bacteremia due to Proteus species  Cont abx- refer to sepsis plan      Acute respiratory failure with hypoxia  Patient with Hypoxic Respiratory failure which is Acute.  she is not on home oxygen. Supplemental oxygen was provided and noted-      .   Signs/symptoms of respiratory failure include- tachypnea and increased work of breathing. Contributing diagnoses includes - Aspiration Labs and images were reviewed. Patient Has not had a recent ABG. Will treat underlying causes and adjust management of  respiratory failure as follows- abx, supplemental o2 as needed    Resolving     Leucocytosis  Resolving      Hyperkalemia  This patient has hyperkalemia which is uncontrolled. We will monitor for arrhythmias with EKG or continuous telemetry. We will treat the hyperkalemia with IV fluids, follow up repeat BMP. The likely etiology of the hyperkalemia is COURTNEY.  The patients latest potassium has been reviewed and the results are listed below  Recent Labs   Lab 12/14/23  0500   K 3.8               UTI (urinary tract infection)  Urine cultures grew Proteus mirabilis ESBL on admission  Continue with IV antibiotics   Follow up CBC and CMP  Urine culture negative on 12/11/2023      Pressure injury of sacral region, unstageable  Wound care consulted  Rec: Triad + mepilex to the BL medial thighs. Pack sacral wound with silver alginate rope      Aphasia  Noted      Cognitive communication deficit  Chronic issue      Combined forms of age-related cataract, bilateral  Aware      COURTNEY (acute kidney injury)  Patient with acute kidney injury/acute renal failure likely due to pre-renal azotemia due to dehydration COURTNEY is currently stable. Baseline creatinine  0.6  - Labs reviewed- Renal function/electrolytes with Estimated Creatinine Clearance: 79.7 mL/min (based on SCr of 0.6 mg/dL). according to latest data. Monitor urine output and serial BMP and adjust therapy as needed. Avoid nephrotoxins and renally dose meds for GFR listed above.    Resolving       CVA (cerebral vascular accident)  History of CVA, nonverbal and bed-bound at baseline  Has PEG tube, nutrition consulted for tube feedings.    Has chronic indwelling catheter, replaced in the emergency room.      Mixed hyperlipidemia  Continue outpatient statin      Hyperglycemia due to type 2 diabetes mellitus  Continue Levemir, cont patient on sliding scale      Hypertension associated with diabetes  Continue outpatient antihypertensives, monitor blood pressure        VTE Risk  Mitigation (From admission, onward)           Ordered     apixaban tablet 2.5 mg  2 times daily         12/05/23 2300     IP VTE HIGH RISK PATIENT  Once         12/05/23 2300     Place sequential compression device  Until discontinued         12/05/23 2300                    Discharge Planning   ALEX: 12/18/2023     Code Status: Full Code   Is the patient medically ready for discharge?:     Reason for patient still in hospital (select all that apply): Patient trending condition  Discharge Plan A: Return to nursing home, Skilled Nursing Facility                  Amber Fernández MD  Department of Hospital Medicine   [facility]

## 2023-12-15 NOTE — PROGRESS NOTES
[facility]  Department of Infectious Disease  Progress Note        PATIENT NAME: Steff Johnson  MRN: [id number]  TODAY'S DATE: 12/15/2023  ADMIT DATE: 12/5/2023  LENGTH OF STAY: 10 DAYS    PRINCIPLE PROBLEM: Severe sepsis    REASON FOR CONSULT:  Gram negative sepsis     INTERVAL HISTORY     12/15@2155 (Danette):  Patient seen and examined in her room.  She was not verbal today.  Makes very poor eyeContact.  WBC 18.90, platelets trending up at 1067, no bands, left shift with neutrophils 77%, BUN/CR 12/0.5, ALT/AST 37/19. Cultures from 12/11 are negative and urine culture.  She underwent cystoscopy yesterday no evidence of hydronephrosis no stents, urinary catheter replaced, mild decrease in drainage bilaterally due to thickening of the bladder.  Heme-Onc has been consulted for thrombocytosis.  She continues on micafungin, meropenem and will be changed to daptomycin today for ease of dosing.  Total CK is 40.  Atorvastatin 80 mg once daily held. Tmax 99.6 in last 24 hours.    12/13@1118 (Danette):  Patient seen and examined in her room.  She was awake and alert and much more interactive today than she has been.  She actually said a few words and 1 or 2 questions appropriately.  She then becomes silent so it is difficult to get review of systems that have her.  T-max 100.2° in the last 24 hours.  WBC 21.62, platelets 766, BUN/CR 12/0.5, H&H 7.4/22.1.  BUN/CR 12/0.5, ALT/AST 60/38.  CRP is decreased to 55.1.  Last vanc trough 26.  Blood cultures x3 on 12/11 and 12/12 with no growth to date.  Last positive blood cultures on 12/08 with Candida glabrata and Enterococcus faecalis.  GT negative for any vegetation.    12/12@1114 (Danette):  Patient seen and examined alone in her room.  Condition is unchanged.  She is supposed to have a GT today and a blood transfusion.  IR declined any kind of intervention with fluid collection around gallbladder because it has been stable since 2016 and they did not feel like  it was an infectious fluid collection.  T-max 99° in the last 24 hours.  WBC 21.62, neutrophils 75.4%, platelets 766, no bands, H&H 7.4/22.1, BUN/CR 15/0.5, CRP and procalcitonin trending down with platelets trending up.  Hepatic function panel is pending.  12/12 and 12/11 blood cultures are pending.  12/10 blood cultures x1 set no growth to date.  12/8 blood cultures with 1 bottle of 1 set with Candida glabrata and 1 bottle of 1 set with Enterococcus faecalis.  12/6 blood cultures with 4 4 bottles positive for Proteus mirabilis ESBL.  12/11 urine culture is pending with no growth to date.  She continues on vancomycin, micafungin, and meropenem.    12/11@1237 (Danette):  Patient seen and examined alone in her room.  She was lying in bed and opens eyes to voice.  She makes eye contact and if you ask her if she was doing okay she shakes her head yes and then stops responding and goes back to sleep.  At this point she refuses to follow any commands.  She had a PICC line paced to right upper extremity.  T-max 99.2° in last 24 hours.  T-max since admission 100.4.  WBC slightly elevated at 24.74, H&H decreased to date 6.8/20.5, platelets increased at 582, neutrophils 73.6%, no bands, BUN/CR improved at 21/0.6 with creatinine clearance 79.7.  CRP decreased from 293 to 55, procalcitonin decreased from 133 to 4.  Last vanc level 18 0.2, trough 26, dose of vancomycin adjusted.  Echocardiogram done today with technically difficult study.  Repeat UA with hematuria, pyuria and yeast though urine is quite clear with no sediment and urinary catheter.  12-5 and 12/6 blood cultures with Proteus mirabilis ESBL, 12/8 blood culture with Enterococcus faecalis and Candida glabrata.  12/10 and 12/11 blood culture pending.  CT abdomen and pelvis with contrast with hydronephrosis and hydroureter with no obstruction, gallbladder fluid collection with no biliary dilatation.     12/10/2023 Dr. Calvo covering for the weekend.  Patient looks  much more comfortable today, compared to yesterday.  She is nonverbal as usual.  T-max 99.8°.  WBC worsened 20--24.8.  Blood cultures from 12/ 8 grew Enterococcus vancomycin was started yesterday.  Tonight there are morbid news.  C glabrata from blood cultures.    12/09/2023.  Dr. Calvo covering for the weekend.  Afebrile.  T-max 98.7° but she does feel warm on physical exam.  I hope she is not about to spike a fever.    Nice drop in WBC 40.9--39--24.4--22.8--20.79.  Nice drop in procalcitonin 133--17.9   Nice drop in CRP:  293--143  Repeat blood cultures of 12/08/2023 are negative to date.  Hemoglobin 9.6--7.9--7.9--7.1--7.2.  Platelets are still elevated 577--492--474--421--495.  Catheter was exchanged on the 6th.  Urine is almost clear.    12/7@1246 (Denette):  Patient seen and examined in her room.  She was lying in bed and opens eyes to voice and initially makes eye contact then turns away.  She does not follow commands or move any extremities spontaneously.  T-max 100.4° at 7:00 p.m. yesterday.  WBC decreased to 24.48 from 30/9 0.98, yesterday 37% bands, none today, + left shift, BUN/CR improving at 40/0.9.  , lactic acid decreased to 1.2, procalcitonin 133.  MRSA screen was negative, vanc level 12.0.  Respiratory virus panel negative, 12/6 blood cultures negative, sputum culture not obtain, 12/5 blood cultures with 4 4 bottles positive for Proteus species.  Urine culture with Gram-negative sheryl non lactose .      Antibiotics (From admission, onward)      Start     Stop Route Frequency Ordered    12/11/23 1800  vancomycin in dextrose 5 % 1 gram/250 mL IVPB 1,000 mg         -- IV Every 24 hours (non-standard times) 12/11/23 1743    12/09/23 1607  vancomycin - pharmacy to dose  (vancomycin IVPB (PEDS and ADULTS))        See Hyperspace for full Linked Orders Report.    -- IV pharmacy to manage frequency 12/09/23 1507    12/08/23 5146  meropenem 1 g in sodium chloride 0.9 % 100 mL IVPB (ready to mix  system)        Note to Pharmacy: Original order: meropenem 1 g Q 8 hours    -- IV Every 8 hours (non-standard times) 12/08/23 1627    12/06/23 0900  mupirocin 2 % ointment         12/11/23 0859 Nasl 2 times daily 12/05/23 2308          ASSESSMENT and PLAN     1. Sepsis with polymicrobial bacteremia and fungemia - Proteus mirabilis ESBL, Enterococcus faecalis and Candida glabrata  -12/5 blood cultures x2 sets with 4 of 4 bottles positive for Proteus mirabilis ESBL  -12/6 blood cultures x2 sets 1/4 bottles positive for Proteus mirabilis ESBL  -12/8 blood cultures x2 sets with 1 bottle of 1 set positive for Enterococcus faecalis and 1 bottle of 1 set positive with Candida glabrata   -12/10 blood culture x1 negative  -12/11 blood cultures x3 sets ngtd, pending final  -12/12 blood cultures x3 pending final  -37% bands on admit, WBC 40.96-->39.98-->24.48-->22.83-->20.79-->24.88-->24.74-->21.62-->18.90  -Procalcitonin 133.671-->17.936-->4.179  --->143-->55  -serum lactate 3.8-->3.0-->1.2  -Urine source? del rosario changed, decubitus appears clean  12/05/2023 urine culture Proteus mirabilis ESBL; 12/11 repeat urine cx pending UA with yeast, pyuria and hematuria  -12/11 Urine culture ngtd, pending final  -Urine culture on prior visit, 10/24/2023 had Enterococcus faecalis.   -echocardiogram and GT negative for vegetation    2. Acute respiratory failure with hypoxia with possible right-sided aspiration pneumonia and right hilar lymphadenopathy                -on 1-2 L O2 - weaned off  -CT chest with patch really nodular opacities of the right upper lobe possibly aspiration pneumonitis and right middle and lower lobe with pneumonia and/or atelectasis  -Improving, on room air   -CT chest with right-greater-than-left bilateral opacities some improvement in right lower lobe and slight progression and left lower lobe.  -CT abdomen and pelvis with contrast from today shows ill-defined fluid collection within gallbladder fossa  with no biliary dilatation, mildly enlarged right hilar lymph node, progression and left lower lobe, improvement in right lower lobe, unchanged masslike lesion long the anterior margin of the spleen, bilateral hydronephrosis and hydroureter without obstructing etiology with nondistended urinary catheter and diffuse body wall edema.  Possible small hip joint effusions    3. Impaired cognition and mobility s/p CVA living in SNF with chronic indwelling Mathur catheter and decubitus ulcer    4. Acute kidney injury on admission, resolved with CT with bilateral hydronephrosis and hydroureter with no obstruction  -renal ultrasound with probable mild right hydronephrosis no stones; she was seen by Urology on last admission for mild right hydroureteronephrosis possibly due to neurogenic bladder and reflex versus obstruction from poorly compliant bladder   -CT abdomen and pelvis with shotty retroperitoneal lymph nodes, 1 mm calcification in distal left ureter, cholelithiasis  -Repeat CT abdomen and pelvis with contrast with bilateral hydronephrosis and hydroureter with no obstruction     5. Chronic medical problems including diabetes mellitus, hypertension,  bed-bound, and COPD, anemia   -HGB 6.8/20.5; baseline around 8-9 but was 6.8 in October with 1u PRBC  on 10/26    6. Bilateral superficial thrombus of cephalic veins and thrombocytosis   -recommend leaving midline in at this time - discussed with midline team   -followed by Heme-Onc   -on apixaban         RECOMMENDATIONS:  Stop vancomycin  Start daptomycin 500mg IV every 24 hours for 2 weeks through December 28th for enterococcus faecalis bacteremia - Total CK 40  Continue meropenem  1 g IV every 8 hours PM ESBL for 2 weeks through December 28th  Continue micafungin for C glabrata candidemia for 2 weeks through December 28th  Monitor inflammatory/infection markers, WBC and diff - all improving  Midline placed on 12/11  Wound care to all affected areas  Aspiration  precautions       INFECTIOUS DISEASE DISCHARGE RECOMMENDATIONS:  When patient is ready to be discharged by attending, Infectious Disease recommends the following    Micafungin 100 mg IV Q 24 hours for 2 weeks through December 25th   Meropenem1 g IV every 8 hours for 2 weeks through December 25th   Daptomycin 500 mg IV every 24 hours for 2 weeks through December 25th - First dose today prior to discharge  Weekly labs including CBC with diff, CMP and CRP for duration of antibiotic therapy   Twice weekly CPK on Mondays and Thursdays  Hold atorvastatin (80mg) for duration of therapy with daptomycin, may resume when complete  Maintain Mid line while on antibiotics/antifungals  Remove Mid line at completion of therapy  Follow-up with Infectious Disease in 3-4 weeks, secure chat sent to office   Probiotic of choice for duration of antibiotic therapy  Recommend changing urinary catheter every 2-3 weeks  Recommend follow-up CT chest for resolution of right hilar lymphadenopathy    Communication sent to case management     DW Dr Cochran, Hospitalist    Infectious Disease will sign off. Please send Epic secure chat with any questions.      SUBJECTIVE    Review of Systems  Unable to obtain due to aphasia    OBJECTIVE   Temp:  [97.8 °F (36.6 °C)-99.6 °F (37.6 °C)] 97.8 °F (36.6 °C)  Pulse:  [81-96] 82  Resp:  [16-19] 18  SpO2:  [95 %-99 %] 95 %  BP: (141-176)/(73-95) 167/95  Temp:  [97.8 °F (36.6 °C)-99.6 °F (37.6 °C)]   Temp: 97.8 °F (36.6 °C) (12/15/23 1100)  Pulse: 82 (12/15/23 1100)  Resp: 18 (12/15/23 1100)  BP: (!) 167/95 (12/15/23 1100)  SpO2: 95 % (12/15/23 1100)    Intake/Output Summary (Last 24 hours) at 12/15/2023 1316  Last data filed at 12/15/2023 0911  Gross per 24 hour   Intake 810 ml   Output 950 ml   Net -140 ml          Physical Exam  General:  Eyes open to voice, non-verbal. In no distress.  Eyes: Eyes with no icterus or injection.  No exudates.  Ears:  Hearing seems grossly intact - responds to voice.  Mouth:  Not able to visualize oral cavity  Cardiovascular: Regular rate and rhythm, no murmurs, Improved edema.  Respiratory:  Clear anteriorly, on RA, no tachypnea or increased work of breathing.   Gastrointestinal:  Soft and obese with active bowel sounds, no distention.  Peg tube with no redness or drainage at PEG site.   Genitourinary:  Urinary catheter with clear yellow urine  Musculoskeletal:  No spontaneous movement, heel protectors in place.   Skin: Non-verbal today  Psych:  No agitation.  VAD: Midline rt upper extremity  Isolation: Contact for ESBL    WOUNDS    12/13:                12/6:                         Significant Labs: All pertinent labs within the past 24 hours have been reviewed.    CBC LAST 7 DAYS  Recent Labs   Lab 12/09/23  0547 12/09/23  0547 12/10/23  0626 12/11/23  0629 12/12/23  0855 12/14/23  0500 12/15/23  0551   WBC 20.79*  --  24.88* 24.74* 21.62* 18.11* 18.90*   RBC 2.88*  --  2.85* 2.69* 2.93* 2.79* 2.93*   HGB 7.2*  --  7.3* 6.8* 7.4* 7.0* 7.3*   HCT 21.9*  --  21.9* 20.5* 22.1* 21.2* 22.5*   MCV 76*  --  77* 76* 75* 76* 77*   MCH 25.0*  --  25.6* 25.3* 25.3* 25.1* 24.9*   MCHC 32.9  --  33.3 33.2 33.5 33.0 32.4   RDW 22.3*  --  22.9* 22.5* 22.5* 22.8* 23.2*   *  --  383 582* 766* 998* 1,067*   MPV 10.7  --  11.5 10.9 10.6 9.8 10.0   GRAN 72.0  --  76.0* 73.6*  18.2* 75.4*  16.3* 75.1*  13.6* 77.3*  14.6*   LYMPH 12.0*   < > 14.0*  CANCELED 11.0*  2.7 11.1*  2.4 10.2*  1.9 9.1*  1.7   MONO 10.0   < > 5.0  CANCELED 7.6  1.9* 6.9  1.5* 8.4  1.5* 7.4  1.4*   BASO  --   --  CANCELED 0.11 0.11 0.09 0.12   NRBC 0  --  0 0 0 0 0    < > = values in this interval not displayed.       CHEMISTRY LAST 7 DAYS  Recent Labs   Lab 12/09/23  0547 12/10/23  0626 12/11/23  0629 12/12/23  0855 12/13/23  0606 12/14/23  0500 12/15/23  0551    140 136 136 136 137 138   K 4.0 4.3 4.2 3.9 4.9 3.8 3.9    108 105 106 106 106 105   CO2 28 26 25 23 19* 22* 24   ANIONGAP 6* 6* 6* 7* 11  "9 9   BUN 25* 24* 21 15 12 13 12   CREATININE 0.6 0.6 0.6 0.5 0.5 0.6 0.5    140* 123* 120* 72 105 165*   CALCIUM 8.9 8.5* 8.3* 8.6* 8.5* 8.4* 8.8   MG 1.8 2.0 2.0 1.9 1.9 2.0 2.0   ALBUMIN  --   --   --  2.8* 3.1* 3.0* 2.9*   PROT  --   --   --  6.8 6.8 6.9 7.0   ALKPHOS  --   --   --  201* 199* 182* 181*   ALT  --   --   --  85* 60* 46* 37   AST  --   --   --  46* 38 22 19   BILITOT  --   --   --  0.6 0.6 0.5 0.5         Estimated Creatinine Clearance: 95.6 mL/min (based on SCr of 0.5 mg/dL).    INFLAMMATORY/PROCAL  LAST 7 DAYS  Recent Labs   Lab 12/09/23  0547 12/11/23  0937   PROCAL 17.936* 4.179*   .3* 55.1*       No results found for: "ESR"  CRP   Date Value Ref Range Status   12/11/2023 55.1 (H) 0.0 - 8.2 mg/L Final   12/09/2023 143.3 (H) 0.0 - 8.2 mg/L Final   12/06/2023 293.0 (H) 0.0 - 8.2 mg/L Final   10/25/2023 92.6 (H) 0.0 - 8.2 mg/L Final   06/15/2023 0.65 <0.76 mg/dL Final   11/23/2021 3.21 (H) <0.76 mg/dL Final   07/04/2021 0.34 <0.76 mg/dL Final     PRIOR  MICROBIOLOGY:    Susceptibility data from last 90 days.  Collected Specimen Info Organism Amp/Sulbactam Ampicillin Cefazolin Cefepime Ceftriaxone Ciprofloxacin Ertapenem Genatmicin Synergy Screen Gentamicin Levofloxacin Meropenem Nitrofurantoin PIPERACILLIN/TAZO SUSCEPTIBILITY   12/10/23 Blood No growth after 5 days.                12/08/23 Blood Candida glabrata                12/08/23 Blood Enterococcus faecalis   S       R        12/06/23 Blood from Antecubital, Right Hand Proteus mirabilis ESBL                12/06/23 Blood from Peripheral, Right Arm Proteus mirabilis ESBL                12/05/23 Blood Proteus mirabilis ESBL                12/05/23 Blood Proteus mirabilis ESBL  S  R  R  R  R  R  S   S  R  S   S   12/05/23 Urine Proteus mirabilis ESBL  S  R  R  R  R  R  S   S  R  S  R  S   10/24/23 Urine from Clean Catch Enterococcus faecalis   S           S    10/24/23 Blood from Antecubital, Right No growth after 5 days.          "       10/24/23 Blood from Antecubital, Left No growth after 5 days.                  Collected Specimen Info Organism Tetracycline Tobramycin Trimeth/Sulfa Vancomycin   12/10/23 Blood No growth after 5 days.       12/08/23 Blood Candida glabrata       12/08/23 Blood Enterococcus faecalis     S   12/06/23 Blood from Antecubital, Right Hand Proteus mirabilis ESBL       12/06/23 Blood from Peripheral, Right Arm Proteus mirabilis ESBL       12/05/23 Blood Proteus mirabilis ESBL       12/05/23 Blood Proteus mirabilis ESBL  R  S  R    12/05/23 Urine Proteus mirabilis ESBL  R  S  R    10/24/23 Urine from Clean Catch Enterococcus faecalis  S    S   10/24/23 Blood from Antecubital, Right No growth after 5 days.       10/24/23 Blood from Antecubital, Left No growth after 5 days.           LAST 7 DAYS MICROBIOLOGY   Microbiology Results (last 7 days)       Procedure Component Value Units Date/Time    Blood culture [[id number]] Collected: 12/11/23 0937    Order Status: Completed Specimen: Blood Updated: 12/15/23 1232     Blood Culture, Routine No Growth to date      No Growth to date      No Growth to date      No Growth to date      No Growth to date    Blood culture [[id number]] Collected: 12/12/23 0853    Order Status: Completed Specimen: Blood Updated: 12/15/23 1032     Blood Culture, Routine No Growth to date      No Growth to date      No Growth to date      No Growth to date    Narrative:      Collection has been rescheduled by LND at 12/12/2023 07:02 Reason:   Unable to collect  Collection has been rescheduled by LND at 12/12/2023 07:02 Reason:   Unable to collect    Blood culture [[id number]] Collected: 12/11/23 0628    Order Status: Completed Specimen: Blood Updated: 12/15/23 1032     Blood Culture, Routine No Growth to date      No Growth to date      No Growth to date      No Growth to date      No Growth to date    Blood culture [[id number]] Collected: 12/12/23 0854    Order Status: Completed Specimen: Blood  Updated: 12/15/23 1032     Blood Culture, Routine No Growth to date      No Growth to date      No Growth to date      No Growth to date    Narrative:      Collection has been rescheduled by LND at 12/12/2023 07:01 Reason:   Unable to collect  Collection has been rescheduled by LND at 12/12/2023 07:01 Reason:   Unable to collect    Blood culture [[id number]] Collected: 12/12/23 0853    Order Status: Completed Specimen: Blood Updated: 12/15/23 1032     Blood Culture, Routine No Growth to date      No Growth to date      No Growth to date      No Growth to date    Narrative:      Collection has been rescheduled by LND at 12/12/2023 07:02 Reason:   Unable to collect  Collection has been rescheduled by LND at 12/12/2023 07:02 Reason:   Unable to collect    Blood culture [[id number]] Collected: 12/10/23 0626    Order Status: Completed Specimen: Blood Updated: 12/15/23 0832     Blood Culture, Routine No growth after 5 days.    Blood culture [[id number]]  (Abnormal) Collected: 12/08/23 1208    Order Status: Completed Specimen: Blood Updated: 12/15/23 0732     Blood Culture, Routine Gram stain aer bottle: budding yeast      Results called to and read back by: Mary Braun LPN on 12MEDSU,      12/10/2023 17:29      AUBREY GLABRATA    Urine culture [[id number]] Collected: 12/11/23 0938    Order Status: Completed Specimen: Urine Updated: 12/14/23 0918     Urine Culture, Routine No growth    Narrative:      Specimen Source->Urine    Blood culture [[id number]]  (Abnormal)  (Susceptibility) Collected: 12/08/23 1208    Order Status: Completed Specimen: Blood Updated: 12/11/23 0639     Blood Culture, Routine Gram stain aer bottle: Gram positive cocci      Results called to and read back by: Priscilla Boyce RN on 12MEDSU, 12/09/2023 14:53 vcb      ENTEROCOCCUS FAECALIS    Rapid Organism ID by PCR (from Blood culture) [[id number]]  (Abnormal) Collected: 12/08/23 1208    Order Status: Completed Updated: 12/10/23 1853      Enterococcus faecalis Not Detected     Enterococcus faecium Not Detected     Listeria monocytogenes Not Detected     Staphylococcus spp. Not Detected     Staphylococcus aureus Not Detected     Staphylococcus epidermidis Not Detected     Staphylococcus lugdunensis Not Detected     Streptococcus species Not Detected     Streptococcus agalactiae Not Detected     Streptococcus pneumoniae Not Detected     Streptococcus pyogenes Not Detected     Acinetobacter calcoaceticus/baumannii complex Not Detected     Bacteroides fragilis Not Detected     Enterobacterales Not Detected     Enterobacter cloacae complex Not Detected     Escherichia coli Not Detected     Klebsiella aerogenes Not Detected     Klebsiella oxytoca Not Detected     Klebsiella pneumoniae group Not Detected     Proteus Not Detected     Salmonella sp Not Detected     Serratia marcescens Not Detected     Haemophilus influenzae Not Detected     Neisseria meningtidis Not Detected     Pseudomonas aeruginosa Not Detected     Stenotrophomonas maltophilia Not Detected     Candida albicans Not Detected     Candida auris Not Detected     Juli glabrata Detected     Juli krusei Not Detected     Candida parapsilosis Not Detected     Candida tropicalis Not Detected     Cryptococcus neoformans/gattii Not Detected     CTX-M (ESBL ) Test not applicable     IMP (Carbapenem resistant) Test not applicable     KPC resistance gene (Carbapenem resistant) Test not applicable     mcr-1  Test not applicable     mec A/C  Test not applicable     mec A/C and MREJ (MRSA) gene Test not applicable     NDM (Carbapenem resistant) Test not applicable     OXA-48-like (Carbapenem resistant) Test not applicable     van A/B (VRE gene) Test not applicable     VIM (Carbapenem resistant) Test not applicable    Blood culture [[id number]]  (Abnormal) Collected: 12/06/23 1521    Order Status: Completed Specimen: Blood from Peripheral, Right Arm Updated: 12/10/23 2029     Blood Culture,  Routine Gram stain aer bottle: Gram negative rods      Positive results previously called 12/09/2023  00:48 KS3      PROTEUS MIRABILIS ESBL  For susceptibility see order #[id number]  Known ESBL patient      Narrative:      Collection has been rescheduled by [facility] at 12/06/2023 13:17 Reason:   Duplicate order waiting for dr lott to cancel. Spoke to rob RN.  Collection has been rescheduled by [facility] at 12/06/2023 13:17 Reason:   Duplicate order waiting for dr lott to cancel. Spoke to rob GARCIA.    Rapid Organism ID by PCR (from Blood culture) [[id number]]  (Abnormal) Collected: 12/08/23 1208    Order Status: Completed Updated: 12/09/23 1551     Enterococcus faecalis Detected     Enterococcus faecium Not Detected     Listeria monocytogenes Not Detected     Staphylococcus spp. Not Detected     Staphylococcus aureus Not Detected     Staphylococcus epidermidis Not Detected     Staphylococcus lugdunensis Not Detected     Streptococcus species Not Detected     Streptococcus agalactiae Not Detected     Streptococcus pneumoniae Not Detected     Streptococcus pyogenes Not Detected     Acinetobacter calcoaceticus/baumannii complex Not Detected     Bacteroides fragilis Not Detected     Enterobacterales Not Detected     Enterobacter cloacae complex Not Detected     Escherichia coli Not Detected     Klebsiella aerogenes Not Detected     Klebsiella oxytoca Not Detected     Klebsiella pneumoniae group Not Detected     Proteus Not Detected     Salmonella sp Not Detected     Serratia marcescens Not Detected     Haemophilus influenzae Not Detected     Neisseria meningtidis Not Detected     Pseudomonas aeruginosa Not Detected     Stenotrophomonas maltophilia Not Detected     Candida albicans Not Detected     Candida auris Not Detected     Candida glabrata Not Detected     Candida krusei Not Detected     Candida parapsilosis Not Detected     Candida tropicalis Not Detected     Cryptococcus neoformans/gattii Not Detected     CTX-M (ESBL  ) Test not applicable     IMP (Carbapenem resistant) Test not applicable     KPC resistance gene (Carbapenem resistant) Test not applicable     mcr-1  Test not applicable     mec A/C  Test not applicable     mec A/C and MREJ (MRSA) gene Test not applicable     NDM (Carbapenem resistant) Test not applicable     OXA-48-like (Carbapenem resistant) Test not applicable     van A/B (VRE gene) Not Detected     VIM (Carbapenem resistant) Test not applicable              CURRENT/PREVIOUS VISIT EKG  Results for orders placed or performed during the hospital encounter of 12/05/23   EKG 12-LEAD    Collection Time: 12/11/23  4:19 PM    Narrative    Test Reason : I48.91,I48.92,    Vent. Rate : 081 BPM     Atrial Rate : 081 BPM     P-R Int : 144 ms          QRS Dur : 068 ms      QT Int : 404 ms       P-R-T Axes : 051 026 054 degrees     QTc Int : 469 ms    Normal sinus rhythm  Normal ECG  When compared with ECG of 05-DEC-2023 19:35,  No significant change was found    Referred By: [email or web address]   SELF           Confirmed By:        ECHO 12/11/23 1319  Left Ventricle The left ventricle is normal in size. Normal wall thickness. Normal wall motion. There is normal systolic function with a visually estimated ejection fraction of 60 - 65%. There is normal diastolic function.   Right Ventricle Normal right ventricular cavity size. Wall thickness is normal. Right ventricle wall motion  is normal. Systolic function is normal.   Left Atrium Normal left atrial size.   Right Atrium Normal right atrial size.   Aortic Valve The aortic valve is structurally normal. There is normal leaflet mobility. Aortic valve peak velocity is 1.25 m/s. Mean gradient is 3 mmHg.   Mitral Valve The mitral valve is structurally normal. There is normal leaflet mobility. The mean pressure gradient across the mitral valve is 2 mmHg at a heart rate of  bpm.   Tricuspid Valve The tricuspid valve is structurally normal. There is normal leaflet mobility.   Pulmonic  Valve The pulmonic valve is structurally normal. There is normal leaflet mobility.   IVC/SVC Normal venous pressure at 3 mmHg.   Ascending Aorta Aortic root is normal in size. Ascending aorta is normal.   Pericardium and Other Findings There is no pericardial effusion.   Pulmonary Artery The estimated pulmonary artery systolic pressure is 11 mmHg.        GT 12/12/2023    Left Ventricle: The left ventricle is normal in size. Normal wall thickness. Normal wall motion. There is normal systolic function with a visually estimated ejection fraction of 60 - 65%.    Right Ventricle: Normal right ventricular cavity size. Wall thickness is normal. Right ventricle wall motion  is normal. Systolic function is normal.    Left Atrium: There is no thrombus in the left atrial cavity.    Mitral Valve: There is no stenosis.    No echocardiographic evidence of vegetation or endocarditis seen        Significant Imaging: I have reviewed all relevant and available imaging results/findings within the past 24 hours.    US Retroperitoneal Complete 12/06/23 0123   Limited evaluation. Probable mild right hydronephrosis.     X-Ray Chest AP Portable 12/05/23 1939   IMPRESSION: No acute pulmonary process    CT Chest Without Contrast 12/06/23 1817   Patchy nodular opacities involving the right upper lobe with surrounding groundglass attenuation, concerning for infectious/inflammatory process including aspiration pneumonitis.   Airspace consolidation with volume loss right middle lobe and right lower lobe, likely representing a combination of pneumonia and atelectasis.    CT Renal Stone Study Abd Pelvis WO 12/06/23 1816   Negative for obstructive uropathy.   Probable 1 mm punctate calculus within the distal left ureter.   Cholelithiasis.   Additional incidental findings as described above    CT Chest Abdomen Pelvis With IV Contrast (XPD) Routine Oral Contrast 12/11/23 1142  Mild bilateral hydronephrosis and hydroureter to the level of the  midpelvis without defined etiology. The urinary bladder contains a Mathur catheter and is nondistended.   Persistence of patchy pulmonary airspace opacities, greater on the right. Correlate with evidence of multifocal pneumonia.   Ill-defined fluid collection within the gallbladder fossa similar to prior studies.   Mildly prominent right hilar lymph node possibly reactive. CT Follow-up recommended.   Multiple additional observations as above.    US Lower Extremity Veins Bilateral 12/15/23 0802   FINDINGS:   RIGHT: The right common femoral, saphenofemoral junction, femoral, and popliteal veins demonstrate a normal response to compression maneuvers. There is also a normal response to respiratory variation. The bifurcation of the common femoral vein into the superficial and deep femoral veins is visualized.  Flow is identified in these vessels with no evidence of intraluminal thrombus on either color Doppler or grayscale images. The visualized portions of the right proximal calf veins are also normal.   LEFT: The left common femoral, saphenofemoral junction, femoral, and popliteal veins demonstrate a normal response to compression maneuvers. There is also a normal response to respiratory variation. The bifurcation of the common femoral vein into the superficial and deep femoral veins is visualized.  Flow is identified in these vessels with no evidence of intraluminal thrombus on either color Doppler or grayscale images. The visualized portions of the left proximal calf veins are also normal.   IMPRESSION:   No evidence of acute deep venous thrombosis in the visualized venous segments of the bilateral lower extremities.    US Upper Extremity Veins Bilateral 12/15/23 0802  Echogenic thrombus is noted in the bilateral cephalic veins with no compression. Bilateral  internal jugular, subclavian, axillary, brachial, and basilic show normal compressibility, augmentation, and color Doppler flow. Bilateral  radial and ulnar  veins show normal compressibility, augmentation, and color Doppler flow. IVC of the right cephalic vein noted.   IMPRESSION:   Superficial thrombophlebitis of the bilateral cephalic veins.        Roberta Samaniego NP  Date of Service: 12/15/2023  2:04 PM

## 2023-12-15 NOTE — PROGRESS NOTES
[facility] Medicine  Progress Note    Patient Name: Steff Johnson  MRN: [id number]  Patient Class: IP- Inpatient   Admission Date: 12/5/2023  Length of Stay: 10 days  Attending Physician: Amber Fernández MD  Primary Care Provider: Cristina Ren MD        Subjective:     Principal Problem:Severe sepsis        HPI:  Patient is a 63-year-old female with history of severe CVA with aphasia, upper and lower extremity weakness, bed-bound, cognitive impairment, hypertension, hyperlipidemia, type 2 diabetes, COPD presents to the emergency room for concerns of hematuria and hypoxia.  Patient nonverbal at baseline, history provided by ED physician.  Tried to contact daughter, was unsuccessful.  Per ED, patient presented from [facility], staff noticed bloody urine output from her chronic indwelling Mathur catheter as well as hypoxia.  Non-rebreather was placed on patient and this improved her oxygenation saturation.  Brought to ED by EMS.  Per ED physician patient did have episode of emesis, likely aspirating.    On admission temp 100.4, heart rate 111, O2 89%, WBC 40.96, hemoglobin 9.6, potassium 5.2, creatinine 1.7, lactic 3.8, procalcitonin 133.671.  Review of CXR did not show any obvious consolidations.  UA consistent with UTI.    Overview/Hospital Course:  Patient admitted for hematuria and hypoxia.  Was started on empiric antibiotics.  Blood cultures grew Proteus ESBL, Enterococcus, Candida glabrata.  Infectious Disease consulted.  Wound care consulted for chronic sacral wound.  Patient started on vancomycin, meropenem, micafungin.  Given positive blood cultures justyna was done which was negative for endocarditis/vegetation.  CT scan showed fluid collection in the gallbladder fossa, interventional Radiology consulted recommended no intervention at this time since this is stable compared to prior imaging.  Urology was consulted given concern of hydronephrosis on CT scan although repeat  retroperitoneal ultrasound showed no hydro nephrosis.  Cystoscopy done on 12/14/2023 showed no hydronephrosis, no intervention was done.  Repeat blood cultures on 12/10 have been negative so far.  Platelets noted to be elevated in the thousands, hematology consulted.  Patient had similar presentation during last episode of sepsis.  Likely secondary to infection.    Interval History:  Mentation somewhat improving, patient able to answer questions with 1 word, although still has some disorientation.  Platelets noted to be 1067.  Hematology consulted, likely secondary to infection.  Repeat cultures remain negative.    Review of Systems   Respiratory:  Negative for shortness of breath and wheezing.    Cardiovascular:  Negative for chest pain.   Gastrointestinal:  Negative for abdominal pain.     Objective:     Vital Signs (Most Recent):  Temp: 97.8 °F (36.6 °C) (12/15/23 1100)  Pulse: 82 (12/15/23 1100)  Resp: 18 (12/15/23 1100)  BP: (!) 167/95 (12/15/23 1100)  SpO2: 95 % (12/15/23 1100) Vital Signs (24h Range):  Temp:  [97.8 °F (36.6 °C)-99.6 °F (37.6 °C)] 97.8 °F (36.6 °C)  Pulse:  [81-96] 82  Resp:  [16-19] 18  SpO2:  [95 %-99 %] 95 %  BP: (141-176)/(73-95) 167/95     Weight: 61.8 kg (136 lb 4.3 oz)  Body mass index is 27.52 kg/m².    Intake/Output Summary (Last 24 hours) at 12/15/2023 1320  Last data filed at 12/15/2023 0911  Gross per 24 hour   Intake 810 ml   Output 950 ml   Net -140 ml         Physical Exam  Vitals reviewed.   Constitutional:       Appearance: She is ill-appearing.   HENT:      Head: Normocephalic and atraumatic.   Cardiovascular:      Rate and Rhythm: Normal rate.   Pulmonary:      Effort: No respiratory distress.      Breath sounds: No wheezing.   Abdominal:      General: There is no distension.      Tenderness: There is no abdominal tenderness.   Neurological:      Mental Status: She is alert. Mental status is at baseline.      Comments: Able to speak few words, minimally verbal, improved from  yesterday              Significant Labs: All pertinent labs within the past 24 hours have been reviewed.  CBC:   Recent Labs   Lab 12/14/23  0500 12/15/23  0551   WBC 18.11* 18.90*   HGB 7.0* 7.3*   HCT 21.2* 22.5*   * 1,067*     CMP:   Recent Labs   Lab 12/14/23  0500 12/15/23  0551    138   K 3.8 3.9    105   CO2 22* 24    165*   BUN 13 12   CREATININE 0.6 0.5   CALCIUM 8.4* 8.8   PROT 6.9 7.0   ALBUMIN 3.0* 2.9*   BILITOT 0.5 0.5   ALKPHOS 182* 181*   AST 22 19   ALT 46* 37   ANIONGAP 9 9       Significant Imaging: I have reviewed all pertinent imaging results/findings within the past 24 hours.    Assessment/Plan:      * Severe sepsis  This patient does have evidence of infective focus  My overall impression is sepsis.  Source: Respiratory and Urinary Tract  Antibiotics given-   Antibiotics (72h ago, onward)      Start     Stop Route Frequency Ordered    12/11/23 1800  vancomycin in dextrose 5 % 1 gram/250 mL IVPB 1,000 mg         -- IV Every 24 hours (non-standard times) 12/11/23 1743    12/09/23 1607  vancomycin - pharmacy to dose  (vancomycin IVPB (PEDS and ADULTS))        See Hyperspace for full Linked Orders Report.    -- IV pharmacy to manage frequency 12/09/23 1507    12/08/23 2100  meropenem 1 g in sodium chloride 0.9 % 100 mL IVPB (ready to mix system)        Note to Pharmacy: Original order: meropenem 1 g Q 8 hours    -- IV Every 8 hours (non-standard times) 12/08/23 1627          Infectious disease consulted, continue vancomycin, meropenem, micafungin.  Blood cultures since 12/10 have been negative.  Rene negative for vegetation/endocarditis.  Per Infectious Disease follow up CT chest for right hilar lymphadenopathy resolution. Pt will need LTACH placement. Dc pending heme eval and ID recs.     Hydronephrosis    Status post cystoscopy on 12/14/2023.  No hydronephrosis noted, no need for stents.    Chronic indwelling Mathur catheter  Noted, changed in the ED      Blisters of  multiple sites  Wound care consulted       Bedridden  Noted        Bacteremia due to Proteus species  Cont abx- refer to sepsis plan      Acute respiratory failure with hypoxia  Patient with Hypoxic Respiratory failure which is Acute.  she is not on home oxygen. Supplemental oxygen was provided and noted-      .   Signs/symptoms of respiratory failure include- tachypnea and increased work of breathing. Contributing diagnoses includes - Aspiration Labs and images were reviewed. Patient Has not had a recent ABG. Will treat underlying causes and adjust management of respiratory failure as follows- abx, supplemental o2 as needed    Resolving     Leucocytosis  Resolving      Hyperkalemia  This patient has hyperkalemia which is uncontrolled. We will monitor for arrhythmias with EKG or continuous telemetry. We will treat the hyperkalemia with IV fluids, follow up repeat BMP. The likely etiology of the hyperkalemia is COURTNEY.  The patients latest potassium has been reviewed and the results are listed below  Recent Labs   Lab 12/15/23  0551   K 3.9               UTI (urinary tract infection)  Urine cultures grew Proteus mirabilis ESBL on admission  Continue with IV antibiotics   Follow up CBC and CMP  Urine culture negative on 12/11/2023      Pressure injury of sacral region, unstageable  Wound care consulted  Rec: Triad + mepilex to the BL medial thighs. Pack sacral wound with silver alginate rope      Microcytic anemia  Patient's anemia is currently controlled. Has not received any PRBCs to date. Etiology likely d/t Iron deficiency  Current CBC reviewed-   Lab Results   Component Value Date    HGB 7.3 (L) 12/15/2023    HCT 22.5 (L) 12/15/2023     Monitor serial CBC and transfuse if patient becomes hemodynamically unstable, symptomatic or H/H drops below 7/21.    Thrombocytosis  Likely secondary to infection.  Bilateral upper and lower extremity ultrasounds negative for DVT.  Hematology consulted.  Patient had similar  elevation and platelets in October during sepsis.  will evaluate for myeloproliferative state in order a JAK2 test per Hematology.  Continue Eliquis.      Aphasia  Noted      Cognitive communication deficit  Chronic issue      Combined forms of age-related cataract, bilateral  Aware      COURTNEY (acute kidney injury)  Patient with acute kidney injury/acute renal failure likely due to pre-renal azotemia due to dehydration COURTNEY is currently stable. Baseline creatinine  0.6  - Labs reviewed- Renal function/electrolytes with Estimated Creatinine Clearance: 95.6 mL/min (based on SCr of 0.5 mg/dL). according to latest data. Monitor urine output and serial BMP and adjust therapy as needed. Avoid nephrotoxins and renally dose meds for GFR listed above.    Resolving       CVA (cerebral vascular accident)  History of CVA, nonverbal and bed-bound at baseline  Has PEG tube, nutrition consulted for tube feedings.    Has chronic indwelling catheter, replaced in the emergency room.      Mixed hyperlipidemia  Continue outpatient statin      Hyperglycemia due to type 2 diabetes mellitus  Continue Levemir, cont patient on sliding scale      Hypertension associated with diabetes  Continue outpatient antihypertensives, monitor blood pressure  Restarted losartan given elevated bp        VTE Risk Mitigation (From admission, onward)           Ordered     apixaban tablet 2.5 mg  2 times daily         12/05/23 2300     IP VTE HIGH RISK PATIENT  Once         12/05/23 2300     Place sequential compression device  Until discontinued         12/05/23 2300                    Discharge Planning   ALEX: 12/18/2023     Code Status: Full Code   Is the patient medically ready for discharge?:     Reason for patient still in hospital (select all that apply): Patient unstable  Discharge Plan A: Return to nursing home, Skilled Nursing Facility                  Amber Fernández MD  Department of Hospital Medicine   [facility]

## 2023-12-15 NOTE — ASSESSMENT & PLAN NOTE
This patient has hyperkalemia which is uncontrolled. We will monitor for arrhythmias with EKG or continuous telemetry. We will treat the hyperkalemia with IV fluids, follow up repeat BMP. The likely etiology of the hyperkalemia is COURTNEY.  The patients latest potassium has been reviewed and the results are listed below  Recent Labs   Lab 12/15/23  0551   K 3.9

## 2023-12-15 NOTE — CARE UPDATE
12/14/23 1950   Patient Assessment/Suction   Level of Consciousness (AVPU) alert   Respiratory Effort Normal;Unlabored   Expansion/Accessory Muscles/Retractions expansion symmetric;no retractions;no use of accessory muscles   All Lung Fields Breath Sounds Anterior:;Lateral:;diminished   Rhythm/Pattern, Respiratory depth regular;pattern regular;unlabored   PRE-TX-O2   Device (Oxygen Therapy) room air   SpO2 98 %   Pulse Oximetry Type Intermittent   $ Pulse Oximetry - Multiple Charge Pulse Oximetry - Multiple   Pulse 94   Resp 18   Aerosol Therapy   $ Aerosol Therapy Charges Aerosol Treatment   Daily Review of Necessity (SVN) completed   Respiratory Treatment Status (SVN) given   Treatment Route (SVN) mask   Patient Position (SVN) semi-Berman's   Post Treatment Assessment (SVN) breath sounds unchanged   Signs of Intolerance (SVN) none   Breath Sounds Post-Respiratory Treatment   Throughout All Fields Post-Treatment All Fields   Throughout All Fields Post-Treatment no change   Post-treatment Heart Rate (beats/min) 93   Post-treatment Resp Rate (breaths/min) 18   Respiratory Evaluation   $ Care Plan Tech Time 15 min   $ Eval/Re-eval Charges Re-evaluation

## 2023-12-15 NOTE — SUBJECTIVE & OBJECTIVE
Oncology Treatment Plan:   [No matching plan found]    Medications:  Continuous Infusions:  Scheduled Meds:   amLODIPine  10 mg Oral Daily    apixaban  2.5 mg Per G Tube BID    aspirin  81 mg Per G Tube Daily    atorvastatin  80 mg Per G Tube QHS    famotidine  20 mg Oral Daily    insulin detemir U-100  35 Units Subcutaneous Daily    ipratropium  0.5 mg Nebulization Q6H WAKE    levetiracetam  500 mg Per G Tube BID    meropenem (MERREM) IVPB  1 g Intravenous Q8H    metoprolol tartrate  25 mg Per G Tube BID    micafungin (MYCAMINE) IVPB  100 mg Intravenous Q24H    polyethylene glycol  17 g Per G Tube Daily    tamsulosin  0.4 mg Oral Daily    vancomycin (VANCOCIN) IV (PEDS and ADULTS)  1,000 mg Intravenous Q24H     PRN Meds:0.9%  NaCl infusion (for blood administration), acetaminophen, albuterol-ipratropium, dextrose 50%, dextrose 50%, glucagon (human recombinant), glucose, glucose, hydrALAZINE, insulin aspart U-100, magnesium oxide, magnesium oxide, ondansetron, potassium bicarbonate, potassium bicarbonate, potassium bicarbonate, potassium, sodium phosphates, potassium, sodium phosphates, potassium, sodium phosphates, sodium chloride 0.9%, Pharmacy to dose Vancomycin consult **AND** vancomycin - pharmacy to dose     Review of patient's allergies indicates:  No Known Allergies     Past Medical History:   Diagnosis Date    Arthritis     Complete tear of left rotator cuff 11/09/2017    COPD (chronic obstructive pulmonary disease)     COVID-19 infection 07/02/2021    Diabetes mellitus     H/o Cerebrovascular accident (CVA) of left pontine structure 12/12/2019    Hypertension     Personal history of colonic polyps     Stroke     Wears glasses      Past Surgical History:   Procedure Laterality Date    COLONOSCOPY N/A 4/11/2017    Procedure: COLONOSCOPY;  Surgeon: Jared Antonio MD;  Location: [facility];  Service: Endoscopy;  Laterality: N/A;    ECHOCARDIOGRAM,TRANSESOPHAGEAL N/A 12/12/2023    Procedure: Transesophageal  echo (GT) intra-procedure log documentation;  Surgeon: Antoine Rivera MD;  Location: [facility] CATH/EP LAB;  Service: Cardiology;  Laterality: N/A;    HYSTERECTOMY      OOPHORECTOMY      SHOULDER SURGERY      R    TRANSESOPHAGEAL ECHOCARDIOGRAM WITH POSSIBLE CARDIOVERSION (GT W/ POSS CARDIOVERSION) N/A 5/4/2023    Procedure: Transesophageal echo (GT) intra-procedure log documentation;  Surgeon: Blade Phillip MD;  Location: [facility] CATH/EP LAB;  Service: Cardiology;  Laterality: N/A;     Family History       Problem Relation (Age of Onset)    Anemia Daughter    Asthma Mother    Diabetes Mother, Father, Brother    Hypertension Mother, Brother          Tobacco Use    Smoking status: Never    Smokeless tobacco: Never   Substance and Sexual Activity    Alcohol use: No    Drug use: No    Sexual activity: Not Currently       Review of Systems   Unable to perform ROS: Patient nonverbal     Objective:     Vital Signs (Most Recent):  Temp: 98 °F (36.7 °C) (12/15/23 0808)  Pulse: 81 (12/15/23 0808)  Resp: 18 (12/15/23 0808)  BP: (!) 165/75 (12/15/23 0808)  SpO2: 98 % (12/15/23 0808) Vital Signs (24h Range):  Temp:  [98 °F (36.7 °C)-99.6 °F (37.6 °C)] 98 °F (36.7 °C)  Pulse:  [76-96] 81  Resp:  [16-25] 18  SpO2:  [95 %-100 %] 98 %  BP: (141-200)/(71-95) 165/75     Weight: 61.8 kg (136 lb 4.3 oz)  Body mass index is 27.52 kg/m².  Body surface area is 1.6 meters squared.      Intake/Output Summary (Last 24 hours) at 12/15/2023 0936  Last data filed at 12/15/2023 0911  Gross per 24 hour   Intake 1210 ml   Output 950 ml   Net 260 ml        Physical Exam  Constitutional:       Appearance: She is ill-appearing.   HENT:      Head: Normocephalic and atraumatic.   Eyes:      General: No scleral icterus.     Conjunctiva/sclera: Conjunctivae normal.   Cardiovascular:      Rate and Rhythm: Normal rate.   Pulmonary:      Effort: Pulmonary effort is normal.   Abdominal:      General: Abdomen is flat.          Significant Labs:   CBC:    Recent Labs   Lab 12/14/23  0500 12/15/23  0551   WBC 18.11* 18.90*   HGB 7.0* 7.3*   HCT 21.2* 22.5*   * 1,067*    and CMP:   Recent Labs   Lab 12/14/23  0500 12/15/23  0551    138   K 3.8 3.9    105   CO2 22* 24    165*   BUN 13 12   CREATININE 0.6 0.5   CALCIUM 8.4* 8.8   PROT 6.9 7.0   ALBUMIN 3.0* 2.9*   BILITOT 0.5 0.5   ALKPHOS 182* 181*   AST 22 19   ALT 46* 37   ANIONGAP 9 9       Diagnostic Results:  None

## 2023-12-15 NOTE — SUBJECTIVE & OBJECTIVE
Interval History:  WBC downtrending, 18.11.  Platelets 998.  Patient awake, nonverbal.    Review of Systems   Unable to perform ROS: Patient nonverbal     Objective:     Vital Signs (Most Recent):  Temp: 99.6 °F (37.6 °C) (12/14/23 1650)  Pulse: 89 (12/14/23 1650)  Resp: 18 (12/14/23 1650)  BP: (!) 148/84 (12/14/23 1650)  SpO2: 97 % (12/14/23 1650) Vital Signs (24h Range):  Temp:  [97.6 °F (36.4 °C)-99.6 °F (37.6 °C)] 99.6 °F (37.6 °C)  Pulse:  [76-97] 89  Resp:  [16-25] 18  SpO2:  [95 %-100 %] 97 %  BP: (148-200)/(71-92) 148/84     Weight: 61.8 kg (136 lb 4.3 oz)  Body mass index is 27.52 kg/m².    Intake/Output Summary (Last 24 hours) at 12/14/2023 1813  Last data filed at 12/14/2023 1012  Gross per 24 hour   Intake 600 ml   Output 200 ml   Net 400 ml         Physical Exam  Vitals reviewed.   Constitutional:       Appearance: She is ill-appearing.   HENT:      Head: Normocephalic and atraumatic.   Cardiovascular:      Rate and Rhythm: Normal rate.   Pulmonary:      Effort: No respiratory distress.      Breath sounds: No wheezing.   Abdominal:      General: There is no distension.      Tenderness: There is no abdominal tenderness.   Neurological:      Mental Status: Mental status is at baseline.      Comments: Nonverbal, minimally responsive, opens eyes, does not track.             Significant Labs: All pertinent labs within the past 24 hours have been reviewed.  CBC:   Recent Labs   Lab 12/14/23  0500   WBC 18.11*   HGB 7.0*   HCT 21.2*   *     CMP:   Recent Labs   Lab 12/13/23  0606 12/14/23  0500    137   K 4.9 3.8    106   CO2 19* 22*   GLU 72 105   BUN 12 13   CREATININE 0.5 0.6   CALCIUM 8.5* 8.4*   PROT 6.8 6.9   ALBUMIN 3.1* 3.0*   BILITOT 0.6 0.5   ALKPHOS 199* 182*   AST 38 22   ALT 60* 46*   ANIONGAP 11 9       Significant Imaging: I have reviewed all pertinent imaging results/findings within the past 24 hours.

## 2023-12-15 NOTE — PLAN OF CARE
12/15/23 0715   Patient Assessment/Suction   Level of Consciousness (AVPU) alert   Respiratory Effort Unlabored   All Lung Fields Breath Sounds clear;diminished   PRE-TX-O2   Device (Oxygen Therapy) room air   SpO2 98 %   Pulse Oximetry Type Intermittent   $ Pulse Oximetry - Multiple Charge Pulse Oximetry - Multiple   Pulse 94   Resp 18   Aerosol Therapy   $ Aerosol Therapy Charges Aerosol Treatment   Respiratory Treatment Status (SVN) given   Treatment Route (SVN) mask   Patient Position (SVN) HOB elevated   Post Treatment Assessment (SVN) breath sounds unchanged   Signs of Intolerance (SVN) none   Breath Sounds Post-Respiratory Treatment   Post-treatment Heart Rate (beats/min) 92   Post-treatment Resp Rate (breaths/min) 18   Education   $ Education Bronchodilator;15 min   Respiratory Evaluation   $ Care Plan Tech Time 15 min

## 2023-12-15 NOTE — PLAN OF CARE
Noted consult from ID. Sent clinical info to [facility] and they have responded that they cannot manage the current tx plan in SNF.  Spoke with pt daughter via phone and discussed LTAC.  She would like to have referral sent to [facility] in [place] and this was sent.  Will continue to follow.       12/15/23 1025   Post-Acute Status   Post-Acute Authorization Placement   Post-Acute Placement Status Referrals Sent   Discharge Plan   Discharge Plan A Long-term acute care facility (LTAC)   Discharge Plan B Skilled Nursing Facility

## 2023-12-15 NOTE — ASSESSMENT & PLAN NOTE
Patient's anemia is currently controlled. Has not received any PRBCs to date. Etiology likely d/t Iron deficiency  Current CBC reviewed-   Lab Results   Component Value Date    HGB 7.3 (L) 12/15/2023    HCT 22.5 (L) 12/15/2023     Monitor serial CBC and transfuse if patient becomes hemodynamically unstable, symptomatic or H/H drops below 7/21.

## 2023-12-15 NOTE — CONSULTS
[facility]  Hematology/Oncology  Consult Note    Patient Name: Steff Johnson  MRN: [id number]  Admission Date: 12/5/2023  Hospital Length of Stay: 10 days  Code Status: Full Code   Attending Provider: Amber Fernández MD  Consulting Provider: Prashanth Maynard MD  Primary Care Physician: Cristina Ren MD  Principal Problem:Severe sepsis    Consults  Subjective:     HPI:  Ms. Johnson is a 62yo female with a PMH of severe CVA/Aphasia and is non-verbal except for very simple answers.  Patient is also bed-bound and has multiple other medical problems and is a patient of [facility].  Patient was admitted to [facility] after she developed blood in her urine from an indwelling catheter.  She was found to have bacteremia with proteus, enterococcus and Candida and is being followed by infectious disease.      I have been consulted for a finding of thrombocytosis.  Her current platelet count is over 1000 and this is a relatively new problem.  She was elevated above 1000 in October of this year but was normal in August of this year and on labs previous to this.  Note is made that she was admitted with sepsis in October for three days and was discharged 10/27.  The platelet peak was on 10/24.               Oncology Treatment Plan:   [No matching plan found]    Medications:  Continuous Infusions:  Scheduled Meds:   amLODIPine  10 mg Oral Daily    apixaban  2.5 mg Per G Tube BID    aspirin  81 mg Per G Tube Daily    atorvastatin  80 mg Per G Tube QHS    famotidine  20 mg Oral Daily    insulin detemir U-100  35 Units Subcutaneous Daily    ipratropium  0.5 mg Nebulization Q6H WAKE    levetiracetam  500 mg Per G Tube BID    meropenem (MERREM) IVPB  1 g Intravenous Q8H    metoprolol tartrate  25 mg Per G Tube BID    micafungin (MYCAMINE) IVPB  100 mg Intravenous Q24H    polyethylene glycol  17 g Per G Tube Daily    tamsulosin  0.4 mg Oral Daily    vancomycin (VANCOCIN) IV (PEDS and ADULTS)  1,000 mg Intravenous Q24H      PRN Meds:0.9%  NaCl infusion (for blood administration), acetaminophen, albuterol-ipratropium, dextrose 50%, dextrose 50%, glucagon (human recombinant), glucose, glucose, hydrALAZINE, insulin aspart U-100, magnesium oxide, magnesium oxide, ondansetron, potassium bicarbonate, potassium bicarbonate, potassium bicarbonate, potassium, sodium phosphates, potassium, sodium phosphates, potassium, sodium phosphates, sodium chloride 0.9%, Pharmacy to dose Vancomycin consult **AND** vancomycin - pharmacy to dose     Review of patient's allergies indicates:  No Known Allergies     Past Medical History:   Diagnosis Date    Arthritis     Complete tear of left rotator cuff 11/09/2017    COPD (chronic obstructive pulmonary disease)     COVID-19 infection 07/02/2021    Diabetes mellitus     H/o Cerebrovascular accident (CVA) of left pontine structure 12/12/2019    Hypertension     Personal history of colonic polyps     Stroke     Wears glasses      Past Surgical History:   Procedure Laterality Date    COLONOSCOPY N/A 4/11/2017    Procedure: COLONOSCOPY;  Surgeon: Jared Antonio MD;  Location: [facility];  Service: Endoscopy;  Laterality: N/A;    ECHOCARDIOGRAM,TRANSESOPHAGEAL N/A 12/12/2023    Procedure: Transesophageal echo (GT) intra-procedure log documentation;  Surgeon: nAtoine Rivera MD;  Location: [facility] CATH/EP LAB;  Service: Cardiology;  Laterality: N/A;    HYSTERECTOMY      OOPHORECTOMY      SHOULDER SURGERY      R    TRANSESOPHAGEAL ECHOCARDIOGRAM WITH POSSIBLE CARDIOVERSION (GT W/ POSS CARDIOVERSION) N/A 5/4/2023    Procedure: Transesophageal echo (GT) intra-procedure log documentation;  Surgeon: Blade Phillip MD;  Location: [facility] CATH/EP LAB;  Service: Cardiology;  Laterality: N/A;     Family History       Problem Relation (Age of Onset)    Anemia Daughter    Asthma Mother    Diabetes Mother, Father, Brother    Hypertension Mother, Brother          Tobacco Use    Smoking status: Never    Smokeless tobacco:  Never   Substance and Sexual Activity    Alcohol use: No    Drug use: No    Sexual activity: Not Currently       Review of Systems   Unable to perform ROS: Patient nonverbal     Objective:     Vital Signs (Most Recent):  Temp: 98 °F (36.7 °C) (12/15/23 0808)  Pulse: 81 (12/15/23 0808)  Resp: 18 (12/15/23 0808)  BP: (!) 165/75 (12/15/23 0808)  SpO2: 98 % (12/15/23 0808) Vital Signs (24h Range):  Temp:  [98 °F (36.7 °C)-99.6 °F (37.6 °C)] 98 °F (36.7 °C)  Pulse:  [76-96] 81  Resp:  [16-25] 18  SpO2:  [95 %-100 %] 98 %  BP: (141-200)/(71-95) 165/75     Weight: 61.8 kg (136 lb 4.3 oz)  Body mass index is 27.52 kg/m².  Body surface area is 1.6 meters squared.      Intake/Output Summary (Last 24 hours) at 12/15/2023 0936  Last data filed at 12/15/2023 0911  Gross per 24 hour   Intake 1210 ml   Output 950 ml   Net 260 ml        Physical Exam  Constitutional:       Appearance: She is ill-appearing.   HENT:      Head: Normocephalic and atraumatic.   Eyes:      General: No scleral icterus.     Conjunctiva/sclera: Conjunctivae normal.   Cardiovascular:      Rate and Rhythm: Normal rate.   Pulmonary:      Effort: Pulmonary effort is normal.   Abdominal:      General: Abdomen is flat.          Significant Labs:   CBC:   Recent Labs   Lab 12/14/23  0500 12/15/23  0551   WBC 18.11* 18.90*   HGB 7.0* 7.3*   HCT 21.2* 22.5*   * 1,067*    and CMP:   Recent Labs   Lab 12/14/23  0500 12/15/23  0551    138   K 3.8 3.9    105   CO2 22* 24    165*   BUN 13 12   CREATININE 0.6 0.5   CALCIUM 8.4* 8.8   PROT 6.9 7.0   ALBUMIN 3.0* 2.9*   BILITOT 0.5 0.5   ALKPHOS 182* 181*   AST 22 19   ALT 46* 37   ANIONGAP 9 9       Diagnostic Results:  None  Assessment/Plan:     Thrombocytosis  Ms Johnson has a very high platelet count which is currently over 1000.  I have reviewed her labs and history and at this time this appears most consistent with an inflammatory state from her infection.  This is supported by the instance in  October which occurred during sepsis and did actually return to normal briefly.  I will however evaluate her for a myeloproliferative state and order a KERLINE 2 test today.  Note is also made that she has a significant chronic disease anemia and runs a Hb in the 7-8g/dl range.  I don't think she needs transfusion but this limits any ability to treat the high platelet count as these meds would typically cause worsening anemia as well.  The primary risk here is blood clots and stroke and fortunately she is on Eliquis 2.5mg bid which should offer some protection here.  Will follow her closely and labs have been ordered.         Thank you for your consult. I will follow-up with patient. Please contact us if you have any additional questions.    Prashanth Maynard MD  Hematology/Oncology  [facility]

## 2023-12-15 NOTE — PLAN OF CARE
Problem: Feeding Intolerance (Enteral Nutrition)  Goal: Feeding Tolerance  Outcome: Ongoing, Progressing  Intervention: Prevent and Manage Feeding Intolerance  Flowsheets (Taken 12/15/2023 8185)  Nutrition Support Management:   tube feeding initiated   tube feeding rate increased: Peg restarted and tolerated; rounded abdomen; + bowel sounds. Glucerna 1.5 infusing at 30 mL/hr. Goal rate 40 mL/hr.  Nutrition Risk  Level of Risk/Frequency of Follow-up: high; 2 x / week.

## 2023-12-15 NOTE — ASSESSMENT & PLAN NOTE
Patient with acute kidney injury/acute renal failure likely due to pre-renal azotemia due to dehydration COURTNEY is currently stable. Baseline creatinine  0.6  - Labs reviewed- Renal function/electrolytes with Estimated Creatinine Clearance: 95.6 mL/min (based on SCr of 0.5 mg/dL). according to latest data. Monitor urine output and serial BMP and adjust therapy as needed. Avoid nephrotoxins and renally dose meds for GFR listed above.    Resolving

## 2023-12-15 NOTE — NURSING
Peg tube checked for residual.Tube feeding increased to 30cc/hr due to no residual noted. Tolerating feeding well.

## 2023-12-15 NOTE — SUBJECTIVE & OBJECTIVE
Interval History:  Mentation somewhat improving, patient able to answer questions with 1 word, although still has some disorientation.  Platelets noted to be 1067.  Hematology consulted, likely secondary to infection.  Repeat cultures remain negative.    Review of Systems   Respiratory:  Negative for shortness of breath and wheezing.    Cardiovascular:  Negative for chest pain.   Gastrointestinal:  Negative for abdominal pain.     Objective:     Vital Signs (Most Recent):  Temp: 97.8 °F (36.6 °C) (12/15/23 1100)  Pulse: 82 (12/15/23 1100)  Resp: 18 (12/15/23 1100)  BP: (!) 167/95 (12/15/23 1100)  SpO2: 95 % (12/15/23 1100) Vital Signs (24h Range):  Temp:  [97.8 °F (36.6 °C)-99.6 °F (37.6 °C)] 97.8 °F (36.6 °C)  Pulse:  [81-96] 82  Resp:  [16-19] 18  SpO2:  [95 %-99 %] 95 %  BP: (141-176)/(73-95) 167/95     Weight: 61.8 kg (136 lb 4.3 oz)  Body mass index is 27.52 kg/m².    Intake/Output Summary (Last 24 hours) at 12/15/2023 1320  Last data filed at 12/15/2023 0911  Gross per 24 hour   Intake 810 ml   Output 950 ml   Net -140 ml         Physical Exam  Vitals reviewed.   Constitutional:       Appearance: She is ill-appearing.   HENT:      Head: Normocephalic and atraumatic.   Cardiovascular:      Rate and Rhythm: Normal rate.   Pulmonary:      Effort: No respiratory distress.      Breath sounds: No wheezing.   Abdominal:      General: There is no distension.      Tenderness: There is no abdominal tenderness.   Neurological:      Mental Status: She is alert. Mental status is at baseline.      Comments: Able to speak few words, minimally verbal, improved from yesterday              Significant Labs: All pertinent labs within the past 24 hours have been reviewed.  CBC:   Recent Labs   Lab 12/14/23  0500 12/15/23  0551   WBC 18.11* 18.90*   HGB 7.0* 7.3*   HCT 21.2* 22.5*   * 1,067*     CMP:   Recent Labs   Lab 12/14/23  0500 12/15/23  0551    138   K 3.8 3.9    105   CO2 22* 24    165*   BUN 13  12   CREATININE 0.6 0.5   CALCIUM 8.4* 8.8   PROT 6.9 7.0   ALBUMIN 3.0* 2.9*   BILITOT 0.5 0.5   ALKPHOS 182* 181*   AST 22 19   ALT 46* 37   ANIONGAP 9 9       Significant Imaging: I have reviewed all pertinent imaging results/findings within the past 24 hours.

## 2023-12-15 NOTE — ASSESSMENT & PLAN NOTE
Wound care consulted  Rec: Triad + mepilex to the BL medial thighs. Pack sacral wound with silver alginate rope

## 2023-12-15 NOTE — ASSESSMENT & PLAN NOTE
Ms Johnson has a very high platelet count which is currently over 1000.  I have reviewed her labs and history and at this time this appears most consistent with an inflammatory state from her infection.  This is supported by the instance in October which occurred during sepsis and did actually return to normal briefly.  I will however evaluate her for a myeloproliferative state and order a KERLINE 2 test today.  Note is also made that she has a significant chronic disease anemia and runs a Hb in the 7-8g/dl range.  I don't think she needs transfusion but this limits any ability to treat the high platelet count as these meds would typically cause worsening anemia as well.  The primary risk here is blood clots and stroke and fortunately she is on Eliquis 2.5mg bid which should offer some protection here.  Will follow her closely and labs have been ordered.

## 2023-12-15 NOTE — ASSESSMENT & PLAN NOTE
This patient has hyperkalemia which is uncontrolled. We will monitor for arrhythmias with EKG or continuous telemetry. We will treat the hyperkalemia with IV fluids, follow up repeat BMP. The likely etiology of the hyperkalemia is COURTNEY.  The patients latest potassium has been reviewed and the results are listed below  Recent Labs   Lab 12/14/23  0500   K 3.8

## 2023-12-15 NOTE — ASSESSMENT & PLAN NOTE
Continue outpatient antihypertensives, monitor blood pressure  Restarted losartan given elevated bp

## 2023-12-15 NOTE — ASSESSMENT & PLAN NOTE
Patient with acute kidney injury/acute renal failure likely due to pre-renal azotemia due to dehydration COURTNEY is currently stable. Baseline creatinine  0.6  - Labs reviewed- Renal function/electrolytes with Estimated Creatinine Clearance: 79.7 mL/min (based on SCr of 0.6 mg/dL). according to latest data. Monitor urine output and serial BMP and adjust therapy as needed. Avoid nephrotoxins and renally dose meds for GFR listed above.    Resolving

## 2023-12-15 NOTE — NURSING
Peg tube placement verified with 10cc air. No residual noted. Tolerating feeding well. Increased rate to 20cc.

## 2023-12-15 NOTE — ASSESSMENT & PLAN NOTE
This patient does have evidence of infective focus  My overall impression is sepsis.  Source: Respiratory and Urinary Tract  Antibiotics given-   Antibiotics (72h ago, onward)      Start     Stop Route Frequency Ordered    12/11/23 1800  vancomycin in dextrose 5 % 1 gram/250 mL IVPB 1,000 mg         -- IV Every 24 hours (non-standard times) 12/11/23 1743    12/09/23 1607  vancomycin - pharmacy to dose  (vancomycin IVPB (PEDS and ADULTS))        See Hyperspace for full Linked Orders Report.    -- IV pharmacy to manage frequency 12/09/23 1507    12/08/23 2100  meropenem 1 g in sodium chloride 0.9 % 100 mL IVPB (ready to mix system)        Note to Pharmacy: Original order: meropenem 1 g Q 8 hours    -- IV Every 8 hours (non-standard times) 12/08/23 1627          Infectious disease consulted, continue vancomycin, meropenem, micafungin.  Blood cultures since 12/10 have been negative.  Rene negative for vegetation/endocarditis.  Per Infectious Disease follow up CT chest for right hilar lymphadenopathy resolution. Pt will need LTACH placement. Dc pending heme eval and ID recs.

## 2023-12-15 NOTE — ASSESSMENT & PLAN NOTE
Urine cultures grew Proteus mirabilis ESBL on admission  Continue with IV antibiotics   Follow up CBC and CMP  Urine culture negative on 12/11/2023

## 2023-12-16 PROBLEM — L89.154 PRESSURE INJURY OF SACRAL REGION, STAGE 4: Status: ACTIVE | Noted: 2023-10-25

## 2023-12-16 PROBLEM — D64.9 ANEMIA: Status: ACTIVE | Noted: 2023-10-25

## 2023-12-16 LAB
ABO + RH BLD: NORMAL
ALBUMIN SERPL BCP-MCNC: 2.8 G/DL (ref 3.5–5.2)
ALP SERPL-CCNC: 161 U/L (ref 55–135)
ALT SERPL W/O P-5'-P-CCNC: 29 U/L (ref 10–44)
ANION GAP SERPL CALC-SCNC: 8 MMOL/L (ref 8–16)
ANISOCYTOSIS BLD QL SMEAR: SLIGHT
AST SERPL-CCNC: 17 U/L (ref 10–40)
BACTERIA BLD CULT: NORMAL
BACTERIA BLD CULT: NORMAL
BASOPHILS # BLD AUTO: 0.08 K/UL (ref 0–0.2)
BASOPHILS NFR BLD: 0.5 % (ref 0–1.9)
BILIRUB SERPL-MCNC: 0.4 MG/DL (ref 0.1–1)
BLD GP AB SCN CELLS X3 SERPL QL: NORMAL
BLD PROD TYP BPU: NORMAL
BLOOD UNIT EXPIRATION DATE: NORMAL
BLOOD UNIT TYPE CODE: 9500
BLOOD UNIT TYPE: NORMAL
BUN SERPL-MCNC: 17 MG/DL (ref 8–23)
CALCIUM SERPL-MCNC: 8.4 MG/DL (ref 8.7–10.5)
CHLORIDE SERPL-SCNC: 108 MMOL/L (ref 95–110)
CO2 SERPL-SCNC: 23 MMOL/L (ref 23–29)
CODING SYSTEM: NORMAL
CREAT SERPL-MCNC: 0.6 MG/DL (ref 0.5–1.4)
CROSSMATCH INTERPRETATION: NORMAL
DIFFERENTIAL METHOD BLD: ABNORMAL
DISPENSE STATUS: NORMAL
EOSINOPHIL # BLD AUTO: 0.6 K/UL (ref 0–0.5)
EOSINOPHIL NFR BLD: 3.6 % (ref 0–8)
ERYTHROCYTE [DISTWIDTH] IN BLOOD BY AUTOMATED COUNT: 22.8 % (ref 11.5–14.5)
EST. GFR  (NO RACE VARIABLE): >60 ML/MIN/1.73 M^2
FOLATE SERPL-MCNC: 18.4 NG/ML (ref 4–24)
GLUCOSE SERPL-MCNC: 102 MG/DL (ref 70–110)
GLUCOSE SERPL-MCNC: 127 MG/DL (ref 70–110)
GLUCOSE SERPL-MCNC: 132 MG/DL (ref 70–110)
GLUCOSE SERPL-MCNC: 147 MG/DL (ref 70–110)
GLUCOSE SERPL-MCNC: 148 MG/DL (ref 70–110)
GLUCOSE SERPL-MCNC: 60 MG/DL (ref 70–110)
HCT VFR BLD AUTO: 19.2 % (ref 37–48.5)
HCT VFR BLD AUTO: 25.8 % (ref 37–48.5)
HGB BLD-MCNC: 6.4 G/DL (ref 12–16)
HGB BLD-MCNC: 8.7 G/DL (ref 12–16)
HYPOCHROMIA BLD QL SMEAR: ABNORMAL
IMM GRANULOCYTES # BLD AUTO: 0.51 K/UL (ref 0–0.04)
IMM GRANULOCYTES NFR BLD AUTO: 2.9 % (ref 0–0.5)
LYMPHOCYTES # BLD AUTO: 2.3 K/UL (ref 1–4.8)
LYMPHOCYTES NFR BLD: 13.1 % (ref 18–48)
MAGNESIUM SERPL-MCNC: 2.1 MG/DL (ref 1.6–2.6)
MCH RBC QN AUTO: 25.3 PG (ref 27–31)
MCHC RBC AUTO-ENTMCNC: 33.3 G/DL (ref 32–36)
MCV RBC AUTO: 76 FL (ref 82–98)
MONOCYTES # BLD AUTO: 1.3 K/UL (ref 0.3–1)
MONOCYTES NFR BLD: 7.6 % (ref 4–15)
NEUTROPHILS # BLD AUTO: 12.7 K/UL (ref 1.8–7.7)
NEUTROPHILS NFR BLD: 72.3 % (ref 38–73)
NRBC BLD-RTO: 0 /100 WBC
NUM UNITS TRANS PACKED RBC: NORMAL
OVALOCYTES BLD QL SMEAR: ABNORMAL
PLATELET # BLD AUTO: 1041 K/UL (ref 150–450)
PLATELET BLD QL SMEAR: ABNORMAL
PMV BLD AUTO: 9.4 FL (ref 9.2–12.9)
POIKILOCYTOSIS BLD QL SMEAR: SLIGHT
POLYCHROMASIA BLD QL SMEAR: ABNORMAL
POTASSIUM SERPL-SCNC: 3.8 MMOL/L (ref 3.5–5.1)
PROT SERPL-MCNC: 6.4 G/DL (ref 6–8.4)
RBC # BLD AUTO: 2.53 M/UL (ref 4–5.4)
SCHISTOCYTES BLD QL SMEAR: PRESENT
SODIUM SERPL-SCNC: 139 MMOL/L (ref 136–145)
SPECIMEN OUTDATE: NORMAL
TARGETS BLD QL SMEAR: ABNORMAL
VIT B12 SERPL-MCNC: 638 PG/ML (ref 210–950)
WBC # BLD AUTO: 17.53 K/UL (ref 3.9–12.7)

## 2023-12-16 PROCEDURE — 25000003 PHARM REV CODE 250: Performed by: INTERNAL MEDICINE

## 2023-12-16 PROCEDURE — 25000003 PHARM REV CODE 250: Performed by: STUDENT IN AN ORGANIZED HEALTH CARE EDUCATION/TRAINING PROGRAM

## 2023-12-16 PROCEDURE — 63600175 PHARM REV CODE 636 W HCPCS: Performed by: NURSE PRACTITIONER

## 2023-12-16 PROCEDURE — 86850 RBC ANTIBODY SCREEN: CPT | Performed by: STUDENT IN AN ORGANIZED HEALTH CARE EDUCATION/TRAINING PROGRAM

## 2023-12-16 PROCEDURE — 80053 COMPREHEN METABOLIC PANEL: CPT | Performed by: NURSE PRACTITIONER

## 2023-12-16 PROCEDURE — 94640 AIRWAY INHALATION TREATMENT: CPT

## 2023-12-16 PROCEDURE — 99233 SBSQ HOSP IP/OBS HIGH 50: CPT | Mod: ,,, | Performed by: INTERNAL MEDICINE

## 2023-12-16 PROCEDURE — 85018 HEMOGLOBIN: CPT | Performed by: STUDENT IN AN ORGANIZED HEALTH CARE EDUCATION/TRAINING PROGRAM

## 2023-12-16 PROCEDURE — 25000003 PHARM REV CODE 250: Performed by: NURSE PRACTITIONER

## 2023-12-16 PROCEDURE — 94761 N-INVAS EAR/PLS OXIMETRY MLT: CPT

## 2023-12-16 PROCEDURE — 12000002 HC ACUTE/MED SURGE SEMI-PRIVATE ROOM

## 2023-12-16 PROCEDURE — 94799 UNLISTED PULMONARY SVC/PX: CPT

## 2023-12-16 PROCEDURE — 94644 CONT INHLJ TX 1ST HOUR: CPT

## 2023-12-16 PROCEDURE — 86920 COMPATIBILITY TEST SPIN: CPT | Performed by: STUDENT IN AN ORGANIZED HEALTH CARE EDUCATION/TRAINING PROGRAM

## 2023-12-16 PROCEDURE — 83735 ASSAY OF MAGNESIUM: CPT | Performed by: STUDENT IN AN ORGANIZED HEALTH CARE EDUCATION/TRAINING PROGRAM

## 2023-12-16 PROCEDURE — 85025 COMPLETE CBC W/AUTO DIFF WBC: CPT | Performed by: STUDENT IN AN ORGANIZED HEALTH CARE EDUCATION/TRAINING PROGRAM

## 2023-12-16 PROCEDURE — 63600175 PHARM REV CODE 636 W HCPCS: Performed by: INTERNAL MEDICINE

## 2023-12-16 PROCEDURE — P9016 RBC LEUKOCYTES REDUCED: HCPCS | Performed by: STUDENT IN AN ORGANIZED HEALTH CARE EDUCATION/TRAINING PROGRAM

## 2023-12-16 PROCEDURE — 82746 ASSAY OF FOLIC ACID SERUM: CPT | Performed by: INTERNAL MEDICINE

## 2023-12-16 PROCEDURE — 36415 COLL VENOUS BLD VENIPUNCTURE: CPT | Performed by: STUDENT IN AN ORGANIZED HEALTH CARE EDUCATION/TRAINING PROGRAM

## 2023-12-16 PROCEDURE — 99900035 HC TECH TIME PER 15 MIN (STAT)

## 2023-12-16 PROCEDURE — 25000242 PHARM REV CODE 250 ALT 637 W/ HCPCS: Performed by: STUDENT IN AN ORGANIZED HEALTH CARE EDUCATION/TRAINING PROGRAM

## 2023-12-16 PROCEDURE — 85014 HEMATOCRIT: CPT | Performed by: STUDENT IN AN ORGANIZED HEALTH CARE EDUCATION/TRAINING PROGRAM

## 2023-12-16 PROCEDURE — 99900031 HC PATIENT EDUCATION (STAT)

## 2023-12-16 PROCEDURE — 82607 VITAMIN B-12: CPT | Performed by: INTERNAL MEDICINE

## 2023-12-16 RX ORDER — HYDROCODONE BITARTRATE AND ACETAMINOPHEN 500; 5 MG/1; MG/1
TABLET ORAL
Status: DISCONTINUED | OUTPATIENT
Start: 2023-12-16 | End: 2023-12-19 | Stop reason: HOSPADM

## 2023-12-16 RX ADMIN — IPRATROPIUM BROMIDE 0.5 MG: 0.5 SOLUTION RESPIRATORY (INHALATION) at 07:12

## 2023-12-16 RX ADMIN — APIXABAN 2.5 MG: 2.5 TABLET, FILM COATED ORAL at 08:12

## 2023-12-16 RX ADMIN — FAMOTIDINE 20 MG: 20 TABLET ORAL at 10:12

## 2023-12-16 RX ADMIN — ASPIRIN 81 MG 81 MG: 81 TABLET ORAL at 10:12

## 2023-12-16 RX ADMIN — LEVETIRACETAM 500 MG: 100 SOLUTION ORAL at 09:12

## 2023-12-16 RX ADMIN — TAMSULOSIN HYDROCHLORIDE 0.4 MG: 0.4 CAPSULE ORAL at 10:12

## 2023-12-16 RX ADMIN — MEROPENEM 1 G: 1 INJECTION, POWDER, FOR SOLUTION INTRAVENOUS at 05:12

## 2023-12-16 RX ADMIN — DAPTOMYCIN 500 MG: 500 INJECTION, POWDER, LYOPHILIZED, FOR SOLUTION INTRAVENOUS at 03:12

## 2023-12-16 RX ADMIN — METOPROLOL TARTRATE 25 MG: 25 TABLET, FILM COATED ORAL at 08:12

## 2023-12-16 RX ADMIN — IPRATROPIUM BROMIDE 0.5 MG: 0.5 SOLUTION RESPIRATORY (INHALATION) at 01:12

## 2023-12-16 RX ADMIN — MEROPENEM 1 G: 1 INJECTION, POWDER, FOR SOLUTION INTRAVENOUS at 04:12

## 2023-12-16 RX ADMIN — APIXABAN 2.5 MG: 2.5 TABLET, FILM COATED ORAL at 10:12

## 2023-12-16 RX ADMIN — METOPROLOL TARTRATE 25 MG: 25 TABLET, FILM COATED ORAL at 10:12

## 2023-12-16 RX ADMIN — DEXTROSE MONOHYDRATE 12.5 G: 25 INJECTION, SOLUTION INTRAVENOUS at 07:12

## 2023-12-16 RX ADMIN — MINERAL SUPPLEMENT IRON 300 MG / 5 ML STRENGTH LIQUID 100 PER BOX UNFLAVORED 300 MG: at 10:12

## 2023-12-16 RX ADMIN — INSULIN DETEMIR 35 UNITS: 100 INJECTION, SOLUTION SUBCUTANEOUS at 10:12

## 2023-12-16 RX ADMIN — LOSARTAN POTASSIUM 100 MG: 50 TABLET, FILM COATED ORAL at 10:12

## 2023-12-16 RX ADMIN — AMLODIPINE BESYLATE 10 MG: 5 TABLET ORAL at 10:12

## 2023-12-16 RX ADMIN — LEVETIRACETAM 500 MG: 100 SOLUTION ORAL at 08:12

## 2023-12-16 RX ADMIN — IPRATROPIUM BROMIDE 0.5 MG: 0.5 SOLUTION RESPIRATORY (INHALATION) at 08:12

## 2023-12-16 RX ADMIN — MICAFUNGIN SODIUM 100 MG: 100 INJECTION, POWDER, LYOPHILIZED, FOR SOLUTION INTRAVENOUS at 08:12

## 2023-12-16 RX ADMIN — POLYETHYLENE GLYCOL 3350 17 G: 17 POWDER, FOR SOLUTION ORAL at 10:12

## 2023-12-16 RX ADMIN — MEROPENEM 1 G: 1 INJECTION, POWDER, FOR SOLUTION INTRAVENOUS at 11:12

## 2023-12-16 NOTE — PROGRESS NOTES
[facility] Medicine  Progress Note    Patient Name: Steff Johnson  MRN: [id number]  Patient Class: IP- Inpatient   Admission Date: 12/5/2023  Length of Stay: 11 days  Attending Physician: Amber Fernández MD  Primary Care Provider: Cristina Ren MD        Subjective:     Principal Problem:Severe sepsis        HPI:  Patient is a 63-year-old female with history of severe CVA with aphasia, upper and lower extremity weakness, bed-bound, cognitive impairment, hypertension, hyperlipidemia, type 2 diabetes, COPD presents to the emergency room for concerns of hematuria and hypoxia.  Patient nonverbal at baseline, history provided by ED physician.  Tried to contact daughter, was unsuccessful.  Per ED, patient presented from [facility], staff noticed bloody urine output from her chronic indwelling Mathur catheter as well as hypoxia.  Non-rebreather was placed on patient and this improved her oxygenation saturation.  Brought to ED by EMS.  Per ED physician patient did have episode of emesis, likely aspirating.    On admission temp 100.4, heart rate 111, O2 89%, WBC 40.96, hemoglobin 9.6, potassium 5.2, creatinine 1.7, lactic 3.8, procalcitonin 133.671.  Review of CXR did not show any obvious consolidations.  UA consistent with UTI.    Overview/Hospital Course:  Patient admitted for hematuria and hypoxia.  Was started on empiric antibiotics.  Blood cultures grew Proteus ESBL, Enterococcus, Candida glabrata.  Infectious Disease consulted.  Wound care consulted for chronic sacral wound.  Patient started on vancomycin, meropenem, micafungin.  Given positive blood cultures justyna was done which was negative for endocarditis/vegetation.  CT scan showed fluid collection in the gallbladder fossa, interventional Radiology consulted recommended no intervention at this time since this is stable compared to prior imaging.  Urology was consulted given concern of hydronephrosis on CT scan although repeat  retroperitoneal ultrasound showed no hydro nephrosis.  Cystoscopy done on 12/14/2023 showed no hydronephrosis, no intervention was done.  Repeat blood cultures on 12/10 have been negative so far.  Platelets noted to be elevated in the thousands, hematology consulted.  Patient had similar presentation during last episode of sepsis.  Likely secondary to infection.  Infectious Disease recommended IV antibiotics for 2 weeks.  Plan for discharge to LTAC once accepted.    Interval History:  Patient remains minimally verbal, not following commands.  WBC downtrending.  Platelets 1041 this morning.    Review of Systems   Respiratory:  Negative for shortness of breath and wheezing.    Cardiovascular:  Negative for chest pain.   Gastrointestinal:  Negative for abdominal pain.     Objective:     Vital Signs (Most Recent):  Temp: 97.2 °F (36.2 °C) (12/16/23 0726)  Pulse: 99 (12/16/23 0726)  Resp: 18 (12/16/23 0726)  BP: (!) 151/70 (nurse notified) (12/16/23 0726)  SpO2: 99 % (12/16/23 0726) Vital Signs (24h Range):  Temp:  [97.2 °F (36.2 °C)-99.6 °F (37.6 °C)] 97.2 °F (36.2 °C)  Pulse:  [82-99] 99  Resp:  [17-18] 18  SpO2:  [95 %-100 %] 99 %  BP: (116-167)/(50-95) 151/70     Weight: 61.8 kg (136 lb 4.3 oz)  Body mass index is 27.52 kg/m².    Intake/Output Summary (Last 24 hours) at 12/16/2023 1047  Last data filed at 12/16/2023 0933  Gross per 24 hour   Intake 950 ml   Output 2200 ml   Net -1250 ml         Physical Exam  Vitals reviewed.   Constitutional:       Appearance: She is ill-appearing.   HENT:      Head: Normocephalic and atraumatic.   Cardiovascular:      Rate and Rhythm: Normal rate.   Pulmonary:      Effort: No respiratory distress.      Breath sounds: No wheezing.   Abdominal:      General: There is no distension.      Tenderness: There is no abdominal tenderness.   Neurological:      Mental Status: She is alert. Mental status is at baseline.      Comments: Able to speak few words, minimally verbal,              Significant Labs: All pertinent labs within the past 24 hours have been reviewed.  CBC:   Recent Labs   Lab 12/15/23  0551 12/16/23  0507   WBC 18.90* 17.53*   HGB 7.3* 6.4*   HCT 22.5* 19.2*   PLT 1,067* 1,041*     CMP:   Recent Labs   Lab 12/15/23  0551 12/16/23  0507    139   K 3.9 3.8    108   CO2 24 23   * 127*   BUN 12 17   CREATININE 0.5 0.6   CALCIUM 8.8 8.4*   PROT 7.0 6.4   ALBUMIN 2.9* 2.8*   BILITOT 0.5 0.4   ALKPHOS 181* 161*   AST 19 17   ALT 37 29   ANIONGAP 9 8       Significant Imaging: I have reviewed all pertinent imaging results/findings within the past 24 hours.    Assessment/Plan:      * Severe sepsis  This patient does have evidence of infective focus  My overall impression is sepsis.  Source: Respiratory and Urinary Tract  Antibiotics given-   Antibiotics (72h ago, onward)      Start     Stop Route Frequency Ordered    12/15/23 1500  DAPTOmycin (CUBICIN) 500 mg in sodium chloride 0.9% SolP 50 mL IVPB         -- IV Every 24 hours (non-standard times) 12/15/23 1408    12/08/23 2100  meropenem 1 g in sodium chloride 0.9 % 100 mL IVPB (ready to mix system)        Note to Pharmacy: Original order: meropenem 1 g Q 8 hours    -- IV Every 8 hours (non-standard times) 12/08/23 1627          Infectious disease consulted, continue vancomycin, meropenem, micafungin.  Blood cultures since 12/10 have been negative.  Rene negative for vegetation/endocarditis.  Per Infectious Disease follow up CT chest for right hilar lymphadenopathy resolution. Pt will need LTACH placement.  Infectious disease recommends 2 weeks of IV antibiotics till December 25th.  Follow up with Infectious Disease outpatient 3-4 weeks.      Hydronephrosis    Status post cystoscopy on 12/14/2023.  No hydronephrosis noted, no need for stents.    Chronic indwelling Mathur catheter  Noted, changed in the ED  ID recommends changing catheter every 2-3 weeks.      Blisters of multiple sites  Wound care consulted        Bedridden  Noted        Bacteremia due to Proteus species  Cont abx- refer to sepsis plan      Acute respiratory failure with hypoxia  Patient with Hypoxic Respiratory failure which is Acute.  she is not on home oxygen. Supplemental oxygen was provided and noted-      .   Signs/symptoms of respiratory failure include- tachypnea and increased work of breathing. Contributing diagnoses includes - Aspiration Labs and images were reviewed. Patient Has not had a recent ABG. Will treat underlying causes and adjust management of respiratory failure as follows- abx, supplemental o2 as needed    Resolving     Leucocytosis  Resolving      Hyperkalemia  This patient has hyperkalemia which is uncontrolled. We will monitor for arrhythmias with EKG or continuous telemetry. We will treat the hyperkalemia with IV fluids, follow up repeat BMP. The likely etiology of the hyperkalemia is COURTNEY.  The patients latest potassium has been reviewed and the results are listed below  Recent Labs   Lab 12/16/23  0507   K 3.8               UTI (urinary tract infection)  Urine cultures grew Proteus mirabilis ESBL on admission  Continue with IV antibiotics   Follow up CBC and CMP  Urine culture negative on 12/11/2023      Pressure injury of sacral region, stage 4  Wound care consulted  Rec: Triad + mepilex to the BL medial thighs. Pack sacral wound with silver alginate rope      Microcytic anemia  Patient's anemia is currently controlled. Has not received any PRBCs to date. Etiology likely d/t Iron deficiency  Current CBC reviewed-   Lab Results   Component Value Date    HGB 6.4 (LL) 12/16/2023    HCT 19.2 (LL) 12/16/2023     Monitor serial CBC and transfuse if patient becomes hemodynamically unstable, symptomatic or H/H drops below 7/21.    Thrombocytosis  Likely secondary to infection.  Bilateral upper and lower extremity ultrasounds negative for DVT.  Hematology consulted.  Patient had similar elevation and platelets in October during  sepsis.  will evaluate for myeloproliferative state in order a JAK2 test per Hematology.  Continue Eliquis.      Aphasia  Noted      Cognitive communication deficit  Chronic issue      Combined forms of age-related cataract, bilateral  Aware      COURTNEY (acute kidney injury)  Patient with acute kidney injury/acute renal failure likely due to pre-renal azotemia due to dehydration COURTNEY is currently stable. Baseline creatinine  0.6  - Labs reviewed- Renal function/electrolytes with Estimated Creatinine Clearance: 79.7 mL/min (based on SCr of 0.6 mg/dL). according to latest data. Monitor urine output and serial BMP and adjust therapy as needed. Avoid nephrotoxins and renally dose meds for GFR listed above.    Resolving       CVA (cerebral vascular accident)  History of CVA, nonverbal and bed-bound at baseline  Has PEG tube, nutrition consulted for tube feedings.    Has chronic indwelling catheter, replaced in the emergency room.      Mixed hyperlipidemia  Continue outpatient statin      Hyperglycemia due to type 2 diabetes mellitus  Continue Levemir, cont patient on sliding scale      Hypertension associated with diabetes  Continue outpatient antihypertensives, monitor blood pressure  Restarted losartan given elevated bp        VTE Risk Mitigation (From admission, onward)           Ordered     apixaban tablet 2.5 mg  2 times daily         12/05/23 2300     IP VTE HIGH RISK PATIENT  Once         12/05/23 2300     Place sequential compression device  Until discontinued         12/05/23 2300                    Discharge Planning   ALEX: 12/18/2023     Code Status: Full Code   Is the patient medically ready for discharge?:     Reason for patient still in hospital (select all that apply): Patient trending condition  Discharge Plan A: Long-term acute care facility (LTAC)                  Amber Fernández MD  Department of Hospital Medicine   [facility]

## 2023-12-16 NOTE — SUBJECTIVE & OBJECTIVE
Interval History:     Oncology Treatment Plan:   [No matching plan found]    Medications:  Continuous Infusions:  Scheduled Meds:   amLODIPine  10 mg Oral Daily    apixaban  2.5 mg Per G Tube BID    aspirin  81 mg Per G Tube Daily    DAPTOmycin (CUBICIN) IV (PEDS and ADULTS)  8.1 mg/kg Intravenous Q24H    famotidine  20 mg Oral Daily    ferrous sulfate  300 mg Per G Tube Daily    insulin detemir U-100  35 Units Subcutaneous Daily    ipratropium  0.5 mg Nebulization Q6H WAKE    levetiracetam  500 mg Per G Tube BID    losartan  100 mg Per G Tube Daily    meropenem (MERREM) IVPB  1 g Intravenous Q8H    metoprolol tartrate  25 mg Per G Tube BID    micafungin (MYCAMINE) IVPB  100 mg Intravenous Q24H    polyethylene glycol  17 g Per G Tube Daily    tamsulosin  0.4 mg Oral Daily     PRN Meds:0.9%  NaCl infusion (for blood administration), 0.9%  NaCl infusion (for blood administration), acetaminophen, albuterol-ipratropium, dextrose 50%, dextrose 50%, glucagon (human recombinant), glucose, glucose, hydrALAZINE, insulin aspart U-100, magnesium oxide, magnesium oxide, ondansetron, potassium bicarbonate, potassium bicarbonate, potassium bicarbonate, potassium, sodium phosphates, potassium, sodium phosphates, potassium, sodium phosphates, sodium chloride 0.9%       Objective:     Vital Signs (Most Recent):  Temp: 97.2 °F (36.2 °C) (12/16/23 0726)  Pulse: 99 (12/16/23 0726)  Resp: 18 (12/16/23 0726)  BP: (!) 151/70 (nurse notified) (12/16/23 0726)  SpO2: 99 % (12/16/23 0726) Vital Signs (24h Range):  Temp:  [97.2 °F (36.2 °C)-99.6 °F (37.6 °C)] 97.2 °F (36.2 °C)  Pulse:  [82-99] 99  Resp:  [17-18] 18  SpO2:  [95 %-100 %] 99 %  BP: (116-152)/(50-73) 151/70     Weight: 61.8 kg (136 lb 4.3 oz)  Body mass index is 27.52 kg/m².  Body surface area is 1.6 meters squared.      Intake/Output Summary (Last 24 hours) at 12/16/2023 1103  Last data filed at 12/16/2023 0933  Gross per 24 hour   Intake 950 ml   Output 2200 ml   Net -1250 ml         Physical Exam  HENT:      Head: Normocephalic and atraumatic.   Eyes:      General: No scleral icterus.     Conjunctiva/sclera: Conjunctivae normal.   Cardiovascular:      Rate and Rhythm: Normal rate and regular rhythm.   Pulmonary:      Effort: Pulmonary effort is normal.      Breath sounds: Normal breath sounds.   Abdominal:      General: Abdomen is flat.      Palpations: Abdomen is soft.   Neurological:      Mental Status: She is alert.   Psychiatric:         Mood and Affect: Mood normal.          Significant Labs:   CBC:   Recent Labs   Lab 12/15/23  0551 12/16/23  0507   WBC 18.90* 17.53*   HGB 7.3* 6.4*   HCT 22.5* 19.2*   PLT 1,067* 1,041*    and CMP:   Recent Labs   Lab 12/15/23  0551 12/16/23  0507    139   K 3.9 3.8    108   CO2 24 23   * 127*   BUN 12 17   CREATININE 0.5 0.6   CALCIUM 8.8 8.4*   PROT 7.0 6.4   ALBUMIN 2.9* 2.8*   BILITOT 0.5 0.4   ALKPHOS 181* 161*   AST 19 17   ALT 37 29   ANIONGAP 9 8       Diagnostic Results:  None

## 2023-12-16 NOTE — PLAN OF CARE
12/16/23 0714   Patient Assessment/Suction   Level of Consciousness (AVPU) alert   Respiratory Effort Unlabored   All Lung Fields Breath Sounds diminished   PRE-TX-O2   Device (Oxygen Therapy) room air   SpO2 95 %   Pulse Oximetry Type Intermittent   $ Pulse Oximetry - Multiple Charge Pulse Oximetry - Multiple   Pulse 97   Resp 18   Aerosol Therapy   $ Aerosol Therapy Charges Initial Continuous;Aerosol Treatment   Respiratory Treatment Status (SVN) given   Treatment Route (SVN) mask   Patient Position (SVN) HOB elevated   Post Treatment Assessment (SVN) breath sounds unchanged   Signs of Intolerance (SVN) none   Breath Sounds Post-Respiratory Treatment   Post-treatment Heart Rate (beats/min) 96   Post-treatment Resp Rate (breaths/min) 18   Education   $ Education 15 min;Bronchodilator   Respiratory Evaluation   $ Care Plan Tech Time 15 min

## 2023-12-16 NOTE — SUBJECTIVE & OBJECTIVE
Interval History:  Patient remains minimally verbal, not following commands.  WBC downtrending.  Platelets 1041 this morning.    Review of Systems   Respiratory:  Negative for shortness of breath and wheezing.    Cardiovascular:  Negative for chest pain.   Gastrointestinal:  Negative for abdominal pain.     Objective:     Vital Signs (Most Recent):  Temp: 97.2 °F (36.2 °C) (12/16/23 0726)  Pulse: 99 (12/16/23 0726)  Resp: 18 (12/16/23 0726)  BP: (!) 151/70 (nurse notified) (12/16/23 0726)  SpO2: 99 % (12/16/23 0726) Vital Signs (24h Range):  Temp:  [97.2 °F (36.2 °C)-99.6 °F (37.6 °C)] 97.2 °F (36.2 °C)  Pulse:  [82-99] 99  Resp:  [17-18] 18  SpO2:  [95 %-100 %] 99 %  BP: (116-167)/(50-95) 151/70     Weight: 61.8 kg (136 lb 4.3 oz)  Body mass index is 27.52 kg/m².    Intake/Output Summary (Last 24 hours) at 12/16/2023 1047  Last data filed at 12/16/2023 0933  Gross per 24 hour   Intake 950 ml   Output 2200 ml   Net -1250 ml         Physical Exam  Vitals reviewed.   Constitutional:       Appearance: She is ill-appearing.   HENT:      Head: Normocephalic and atraumatic.   Cardiovascular:      Rate and Rhythm: Normal rate.   Pulmonary:      Effort: No respiratory distress.      Breath sounds: No wheezing.   Abdominal:      General: There is no distension.      Tenderness: There is no abdominal tenderness.   Neurological:      Mental Status: She is alert. Mental status is at baseline.      Comments: Able to speak few words, minimally verbal,             Significant Labs: All pertinent labs within the past 24 hours have been reviewed.  CBC:   Recent Labs   Lab 12/15/23  0551 12/16/23  0507   WBC 18.90* 17.53*   HGB 7.3* 6.4*   HCT 22.5* 19.2*   PLT 1,067* 1,041*     CMP:   Recent Labs   Lab 12/15/23  0551 12/16/23  0507    139   K 3.9 3.8    108   CO2 24 23   * 127*   BUN 12 17   CREATININE 0.5 0.6   CALCIUM 8.8 8.4*   PROT 7.0 6.4   ALBUMIN 2.9* 2.8*   BILITOT 0.5 0.4   ALKPHOS 181* 161*   AST 19 17    ALT 37 29   ANIONGAP 9 8       Significant Imaging: I have reviewed all pertinent imaging results/findings within the past 24 hours.

## 2023-12-16 NOTE — ASSESSMENT & PLAN NOTE
Patient's anemia is currently controlled. Has not received any PRBCs to date. Etiology likely d/t Iron deficiency  Current CBC reviewed-   Lab Results   Component Value Date    HGB 6.4 (LL) 12/16/2023    HCT 19.2 (LL) 12/16/2023     Monitor serial CBC and transfuse if patient becomes hemodynamically unstable, symptomatic or H/H drops below 7/21.

## 2023-12-16 NOTE — PLAN OF CARE
12/15/23 1949   Patient Assessment/Suction   Level of Consciousness (AVPU) alert   Respiratory Effort Normal;Unlabored   Expansion/Accessory Muscles/Retractions expansion symmetric;no retractions;no use of accessory muscles   All Lung Fields Breath Sounds Anterior:;Lateral:;clear;diminished   Rhythm/Pattern, Respiratory depth regular;pattern regular;unlabored   PRE-TX-O2   Device (Oxygen Therapy) room air   SpO2 99 %   Pulse Oximetry Type Intermittent   $ Pulse Oximetry - Multiple Charge Pulse Oximetry - Multiple   Pulse 89   Resp 18   Aerosol Therapy   $ Aerosol Therapy Charges Aerosol Treatment   Daily Review of Necessity (SVN) completed   Respiratory Treatment Status (SVN) given   Treatment Route (SVN) mask;oxygen   Patient Position (SVN) semi-Berman's   Post Treatment Assessment (SVN) breath sounds unchanged   Signs of Intolerance (SVN) none   Breath Sounds Post-Respiratory Treatment   Throughout All Fields Post-Treatment All Fields   Throughout All Fields Post-Treatment no change   Post-treatment Heart Rate (beats/min) 88   Post-treatment Resp Rate (breaths/min) 18   Education   $ Education Bronchodilator;15 min   Respiratory Evaluation   $ Care Plan Tech Time 15 min

## 2023-12-16 NOTE — ASSESSMENT & PLAN NOTE
This patient has hyperkalemia which is uncontrolled. We will monitor for arrhythmias with EKG or continuous telemetry. We will treat the hyperkalemia with IV fluids, follow up repeat BMP. The likely etiology of the hyperkalemia is COURTNEY.  The patients latest potassium has been reviewed and the results are listed below  Recent Labs   Lab 12/16/23  0507   K 3.8

## 2023-12-16 NOTE — DISCHARGE INSTRUCTIONS
Weekly labs including CBC with diff, CMP and CRP for duration of antibiotic therapy   Twice weekly CPK on Mondays and Thursdays  Hold atorvastatin (80mg) for duration of therapy with daptomycin, may resume when complete  Maintain Mid line while on antibiotics/antifungals  Remove Mid line at completion of therapy  Follow-up with Infectious Disease in 3-4 weeks  Probiotic of choice for duration of antibiotic therapy  Recommend changing urinary catheter every 2-3 weeks  Recommend follow-up CT chest for resolution of right hilar lymphadenopathy

## 2023-12-16 NOTE — PROGRESS NOTES
[facility]  Hematology/Oncology  Progress Note    Patient Name: Steff Johnson  Admission Date: 12/5/2023  Hospital Length of Stay: 11 days  Code Status: Full Code     Subjective:     HPI:  Ms. Johnson seems to be doing well.  Is awake and alert today.  Tries to answer questions.  Hb fell this morning.                Interval History:     Oncology Treatment Plan:   [No matching plan found]    Medications:  Continuous Infusions:  Scheduled Meds:   amLODIPine  10 mg Oral Daily    apixaban  2.5 mg Per G Tube BID    aspirin  81 mg Per G Tube Daily    DAPTOmycin (CUBICIN) IV (PEDS and ADULTS)  8.1 mg/kg Intravenous Q24H    famotidine  20 mg Oral Daily    ferrous sulfate  300 mg Per G Tube Daily    insulin detemir U-100  35 Units Subcutaneous Daily    ipratropium  0.5 mg Nebulization Q6H WAKE    levetiracetam  500 mg Per G Tube BID    losartan  100 mg Per G Tube Daily    meropenem (MERREM) IVPB  1 g Intravenous Q8H    metoprolol tartrate  25 mg Per G Tube BID    micafungin (MYCAMINE) IVPB  100 mg Intravenous Q24H    polyethylene glycol  17 g Per G Tube Daily    tamsulosin  0.4 mg Oral Daily     PRN Meds:0.9%  NaCl infusion (for blood administration), 0.9%  NaCl infusion (for blood administration), acetaminophen, albuterol-ipratropium, dextrose 50%, dextrose 50%, glucagon (human recombinant), glucose, glucose, hydrALAZINE, insulin aspart U-100, magnesium oxide, magnesium oxide, ondansetron, potassium bicarbonate, potassium bicarbonate, potassium bicarbonate, potassium, sodium phosphates, potassium, sodium phosphates, potassium, sodium phosphates, sodium chloride 0.9%       Objective:     Vital Signs (Most Recent):  Temp: 97.2 °F (36.2 °C) (12/16/23 0726)  Pulse: 99 (12/16/23 0726)  Resp: 18 (12/16/23 0726)  BP: (!) 151/70 (nurse notified) (12/16/23 0726)  SpO2: 99 % (12/16/23 0726) Vital Signs (24h Range):  Temp:  [97.2 °F (36.2 °C)-99.6 °F (37.6 °C)] 97.2 °F (36.2 °C)  Pulse:  [82-99] 99  Resp:  [17-18]  18  SpO2:  [95 %-100 %] 99 %  BP: (116-152)/(50-73) 151/70     Weight: 61.8 kg (136 lb 4.3 oz)  Body mass index is 27.52 kg/m².  Body surface area is 1.6 meters squared.      Intake/Output Summary (Last 24 hours) at 12/16/2023 1103  Last data filed at 12/16/2023 0933  Gross per 24 hour   Intake 950 ml   Output 2200 ml   Net -1250 ml        Physical Exam  HENT:      Head: Normocephalic and atraumatic.   Eyes:      General: No scleral icterus.     Conjunctiva/sclera: Conjunctivae normal.   Cardiovascular:      Rate and Rhythm: Normal rate and regular rhythm.   Pulmonary:      Effort: Pulmonary effort is normal.      Breath sounds: Normal breath sounds.   Abdominal:      General: Abdomen is flat.      Palpations: Abdomen is soft.   Neurological:      Mental Status: She is alert.   Psychiatric:         Mood and Affect: Mood normal.          Significant Labs:   CBC:   Recent Labs   Lab 12/15/23  0551 12/16/23  0507   WBC 18.90* 17.53*   HGB 7.3* 6.4*   HCT 22.5* 19.2*   PLT 1,067* 1,041*    and CMP:   Recent Labs   Lab 12/15/23  0551 12/16/23  0507    139   K 3.9 3.8    108   CO2 24 23   * 127*   BUN 12 17   CREATININE 0.5 0.6   CALCIUM 8.8 8.4*   PROT 7.0 6.4   ALBUMIN 2.9* 2.8*   BILITOT 0.5 0.4   ALKPHOS 181* 161*   AST 19 17   ALT 37 29   ANIONGAP 9 8       Diagnostic Results:  None  Assessment/Plan:     Thrombocytosis  In addition to this elevated platelet count she also has an elevated WBC and rather severe anemia.  A detailed review of her available labs show that most of this seemed to begin over the summer of 2023 and has been slowly worsening.  Certainly her elevated platelet count seems to spike when she has an acute event such as an infection but this is a higher level than would normally be expected.  Given the finding of leucocytosis and anemia, I do have some concern that she may have a primary marrow myeloproliferative process.  KERLINE 2 is pending which evaluates for essential  thrombocytosis, CML and myeloproliferative disease in general but I will also add flow cytometry which would potentially detect other marrow issues such has other chronic or acute leukemias.      Also noted is a profound anemia of chronic diseases which certainly fits in her given situation.  No evidence of bleeding currently but need to continue to monitor for this.          Thank you for your consult. I will follow-up with patient. Please contact us if you have any additional questions.     Prashanth Maynard MD  Hematology/Oncology  [facility]

## 2023-12-16 NOTE — ASSESSMENT & PLAN NOTE
This patient does have evidence of infective focus  My overall impression is sepsis.  Source: Respiratory and Urinary Tract  Antibiotics given-   Antibiotics (72h ago, onward)      Start     Stop Route Frequency Ordered    12/15/23 1500  DAPTOmycin (CUBICIN) 500 mg in sodium chloride 0.9% SolP 50 mL IVPB         -- IV Every 24 hours (non-standard times) 12/15/23 1408    12/08/23 2100  meropenem 1 g in sodium chloride 0.9 % 100 mL IVPB (ready to mix system)        Note to Pharmacy: Original order: meropenem 1 g Q 8 hours    -- IV Every 8 hours (non-standard times) 12/08/23 1627          Infectious disease consulted, continue vancomycin, meropenem, micafungin.  Blood cultures since 12/10 have been negative.  Rene negative for vegetation/endocarditis.  Per Infectious Disease follow up CT chest for right hilar lymphadenopathy resolution. Pt will need LTACH placement.  Infectious disease recommends 2 weeks of IV antibiotics till December 25th.  Follow up with Infectious Disease outpatient 3-4 weeks.

## 2023-12-16 NOTE — ASSESSMENT & PLAN NOTE
In addition to this elevated platelet count she also has an elevated WBC and rather severe anemia.  A detailed review of her available labs show that most of this seemed to begin over the summer of 2023 and has been slowly worsening.  Certainly her elevated platelet count seems to spike when she has an acute event such as an infection but this is a higher level than would normally be expected.  Given the finding of leucocytosis and anemia, I do have some concern that she may have a primary marrow myeloproliferative process.  KERLINE 2 is pending which evaluates for essential thrombocytosis, CML and myeloproliferative disease in general but I will also add flow cytometry which would potentially detect other marrow issues such has other chronic or acute leukemias.      Also noted is a profound anemia of chronic diseases which certainly fits in her given situation.  No evidence of bleeding currently but need to continue to monitor for this.

## 2023-12-17 LAB
ALBUMIN SERPL BCP-MCNC: 3 G/DL (ref 3.5–5.2)
ALP SERPL-CCNC: 166 U/L (ref 55–135)
ALT SERPL W/O P-5'-P-CCNC: 31 U/L (ref 10–44)
ANION GAP SERPL CALC-SCNC: 7 MMOL/L (ref 8–16)
AST SERPL-CCNC: 24 U/L (ref 10–40)
BACTERIA BLD CULT: NORMAL
BASOPHILS # BLD AUTO: 0.1 K/UL (ref 0–0.2)
BASOPHILS NFR BLD: 0.5 % (ref 0–1.9)
BILIRUB SERPL-MCNC: 0.7 MG/DL (ref 0.1–1)
BUN SERPL-MCNC: 19 MG/DL (ref 8–23)
CALCIUM SERPL-MCNC: 8.8 MG/DL (ref 8.7–10.5)
CHLORIDE SERPL-SCNC: 108 MMOL/L (ref 95–110)
CO2 SERPL-SCNC: 24 MMOL/L (ref 23–29)
CREAT SERPL-MCNC: 0.6 MG/DL (ref 0.5–1.4)
DIFFERENTIAL METHOD BLD: ABNORMAL
EOSINOPHIL # BLD AUTO: 0.5 K/UL (ref 0–0.5)
EOSINOPHIL NFR BLD: 2.5 % (ref 0–8)
ERYTHROCYTE [DISTWIDTH] IN BLOOD BY AUTOMATED COUNT: 21.9 % (ref 11.5–14.5)
EST. GFR  (NO RACE VARIABLE): >60 ML/MIN/1.73 M^2
GLUCOSE SERPL-MCNC: 113 MG/DL (ref 70–110)
GLUCOSE SERPL-MCNC: 116 MG/DL (ref 70–110)
GLUCOSE SERPL-MCNC: 156 MG/DL (ref 70–110)
GLUCOSE SERPL-MCNC: 158 MG/DL (ref 70–110)
GLUCOSE SERPL-MCNC: 178 MG/DL (ref 70–110)
HCT VFR BLD AUTO: 25.2 % (ref 37–48.5)
HGB BLD-MCNC: 8.3 G/DL (ref 12–16)
IMM GRANULOCYTES # BLD AUTO: 0.4 K/UL (ref 0–0.04)
IMM GRANULOCYTES NFR BLD AUTO: 2 % (ref 0–0.5)
LYMPHOCYTES # BLD AUTO: 2 K/UL (ref 1–4.8)
LYMPHOCYTES NFR BLD: 9.9 % (ref 18–48)
MAGNESIUM SERPL-MCNC: 2.1 MG/DL (ref 1.6–2.6)
MCH RBC QN AUTO: 25.7 PG (ref 27–31)
MCHC RBC AUTO-ENTMCNC: 32.9 G/DL (ref 32–36)
MCV RBC AUTO: 78 FL (ref 82–98)
MONOCYTES # BLD AUTO: 1.5 K/UL (ref 0.3–1)
MONOCYTES NFR BLD: 7.5 % (ref 4–15)
NEUTROPHILS # BLD AUTO: 15.3 K/UL (ref 1.8–7.7)
NEUTROPHILS NFR BLD: 77.6 % (ref 38–73)
NRBC BLD-RTO: 0 /100 WBC
PLATELET # BLD AUTO: 1060 K/UL (ref 150–450)
PMV BLD AUTO: 9.5 FL (ref 9.2–12.9)
POTASSIUM SERPL-SCNC: 4.4 MMOL/L (ref 3.5–5.1)
PROT SERPL-MCNC: 6.8 G/DL (ref 6–8.4)
RBC # BLD AUTO: 3.23 M/UL (ref 4–5.4)
SODIUM SERPL-SCNC: 139 MMOL/L (ref 136–145)
WBC # BLD AUTO: 19.72 K/UL (ref 3.9–12.7)

## 2023-12-17 PROCEDURE — 25000003 PHARM REV CODE 250: Performed by: STUDENT IN AN ORGANIZED HEALTH CARE EDUCATION/TRAINING PROGRAM

## 2023-12-17 PROCEDURE — 83735 ASSAY OF MAGNESIUM: CPT | Performed by: STUDENT IN AN ORGANIZED HEALTH CARE EDUCATION/TRAINING PROGRAM

## 2023-12-17 PROCEDURE — 25000003 PHARM REV CODE 250: Performed by: INTERNAL MEDICINE

## 2023-12-17 PROCEDURE — 36415 COLL VENOUS BLD VENIPUNCTURE: CPT | Performed by: STUDENT IN AN ORGANIZED HEALTH CARE EDUCATION/TRAINING PROGRAM

## 2023-12-17 PROCEDURE — 94761 N-INVAS EAR/PLS OXIMETRY MLT: CPT

## 2023-12-17 PROCEDURE — 63600175 PHARM REV CODE 636 W HCPCS: Performed by: INTERNAL MEDICINE

## 2023-12-17 PROCEDURE — 94640 AIRWAY INHALATION TREATMENT: CPT

## 2023-12-17 PROCEDURE — 99231 SBSQ HOSP IP/OBS SF/LOW 25: CPT | Mod: ,,, | Performed by: INTERNAL MEDICINE

## 2023-12-17 PROCEDURE — 80053 COMPREHEN METABOLIC PANEL: CPT | Performed by: NURSE PRACTITIONER

## 2023-12-17 PROCEDURE — 63600175 PHARM REV CODE 636 W HCPCS: Performed by: NURSE PRACTITIONER

## 2023-12-17 PROCEDURE — 99900031 HC PATIENT EDUCATION (STAT)

## 2023-12-17 PROCEDURE — 12000002 HC ACUTE/MED SURGE SEMI-PRIVATE ROOM

## 2023-12-17 PROCEDURE — 25000003 PHARM REV CODE 250: Performed by: NURSE PRACTITIONER

## 2023-12-17 PROCEDURE — 94799 UNLISTED PULMONARY SVC/PX: CPT

## 2023-12-17 PROCEDURE — 99900035 HC TECH TIME PER 15 MIN (STAT)

## 2023-12-17 PROCEDURE — 85025 COMPLETE CBC W/AUTO DIFF WBC: CPT | Performed by: STUDENT IN AN ORGANIZED HEALTH CARE EDUCATION/TRAINING PROGRAM

## 2023-12-17 PROCEDURE — 25000242 PHARM REV CODE 250 ALT 637 W/ HCPCS: Performed by: STUDENT IN AN ORGANIZED HEALTH CARE EDUCATION/TRAINING PROGRAM

## 2023-12-17 RX ADMIN — APIXABAN 2.5 MG: 2.5 TABLET, FILM COATED ORAL at 08:12

## 2023-12-17 RX ADMIN — METOPROLOL TARTRATE 25 MG: 25 TABLET, FILM COATED ORAL at 08:12

## 2023-12-17 RX ADMIN — MEROPENEM 1 G: 1 INJECTION, POWDER, FOR SOLUTION INTRAVENOUS at 08:12

## 2023-12-17 RX ADMIN — IPRATROPIUM BROMIDE 0.5 MG: 0.5 SOLUTION RESPIRATORY (INHALATION) at 01:12

## 2023-12-17 RX ADMIN — INSULIN DETEMIR 35 UNITS: 100 INJECTION, SOLUTION SUBCUTANEOUS at 08:12

## 2023-12-17 RX ADMIN — LEVETIRACETAM 500 MG: 100 SOLUTION ORAL at 08:12

## 2023-12-17 RX ADMIN — MICAFUNGIN SODIUM 100 MG: 100 INJECTION, POWDER, LYOPHILIZED, FOR SOLUTION INTRAVENOUS at 08:12

## 2023-12-17 RX ADMIN — MINERAL SUPPLEMENT IRON 300 MG / 5 ML STRENGTH LIQUID 100 PER BOX UNFLAVORED 300 MG: at 08:12

## 2023-12-17 RX ADMIN — POLYETHYLENE GLYCOL 3350 17 G: 17 POWDER, FOR SOLUTION ORAL at 08:12

## 2023-12-17 RX ADMIN — IPRATROPIUM BROMIDE 0.5 MG: 0.5 SOLUTION RESPIRATORY (INHALATION) at 07:12

## 2023-12-17 RX ADMIN — FAMOTIDINE 20 MG: 20 TABLET ORAL at 08:12

## 2023-12-17 RX ADMIN — DAPTOMYCIN 500 MG: 500 INJECTION, POWDER, LYOPHILIZED, FOR SOLUTION INTRAVENOUS at 02:12

## 2023-12-17 RX ADMIN — TAMSULOSIN HYDROCHLORIDE 0.4 MG: 0.4 CAPSULE ORAL at 08:12

## 2023-12-17 RX ADMIN — INSULIN ASPART 2 UNITS: 100 INJECTION, SOLUTION INTRAVENOUS; SUBCUTANEOUS at 12:12

## 2023-12-17 RX ADMIN — MEROPENEM 1 G: 1 INJECTION, POWDER, FOR SOLUTION INTRAVENOUS at 03:12

## 2023-12-17 RX ADMIN — LOSARTAN POTASSIUM 100 MG: 50 TABLET, FILM COATED ORAL at 08:12

## 2023-12-17 RX ADMIN — ASPIRIN 81 MG 81 MG: 81 TABLET ORAL at 08:12

## 2023-12-17 RX ADMIN — AMLODIPINE BESYLATE 10 MG: 5 TABLET ORAL at 08:12

## 2023-12-17 NOTE — ASSESSMENT & PLAN NOTE
12/16/2023:  In addition to this elevated platelet count she also has an elevated WBC and rather severe anemia.  A detailed review of her available labs show that most of this seemed to begin over the summer of 2023 and has been slowly worsening.  Certainly her elevated platelet count seems to spike when she has an acute event such as an infection but this is a higher level than would normally be expected.  Given the finding of leucocytosis and anemia, I do have some concern that she may have a primary marrow myeloproliferative process.  KERLINE 2 is pending which evaluates for essential thrombocytosis, CML and myeloproliferative disease in general but I will also add flow cytometry which would potentially detect other marrow issues such has other chronic or acute leukemias.      Also noted is a profound anemia of chronic diseases which certainly fits in her given situation.  No evidence of bleeding currently but need to continue to monitor for this.      12/17/2023:  Patient doing ok today.  Labs pending including Jak2 and flow cyto.  Will continue to follow.  Continue daily CBC for now.

## 2023-12-17 NOTE — ASSESSMENT & PLAN NOTE
This patient has hyperkalemia which is uncontrolled. We will monitor for arrhythmias with EKG or continuous telemetry. We will treat the hyperkalemia with IV fluids, follow up repeat BMP. The likely etiology of the hyperkalemia is COURTNEY.  The patients latest potassium has been reviewed and the results are listed below  Recent Labs   Lab 12/17/23  0457   K 4.4

## 2023-12-17 NOTE — SUBJECTIVE & OBJECTIVE
Interval History:     Oncology Treatment Plan:   [No matching plan found]    Medications:  Continuous Infusions:  Scheduled Meds:   amLODIPine  10 mg Oral Daily    apixaban  2.5 mg Per G Tube BID    aspirin  81 mg Per G Tube Daily    DAPTOmycin (CUBICIN) IV (PEDS and ADULTS)  8.1 mg/kg Intravenous Q24H    famotidine  20 mg Oral Daily    ferrous sulfate  300 mg Per G Tube Daily    insulin detemir U-100  35 Units Subcutaneous Daily    ipratropium  0.5 mg Nebulization Q6H WAKE    levetiracetam  500 mg Per G Tube BID    losartan  100 mg Per G Tube Daily    meropenem (MERREM) IVPB  1 g Intravenous Q8H    metoprolol tartrate  25 mg Per G Tube BID    micafungin (MYCAMINE) IVPB  100 mg Intravenous Q24H    polyethylene glycol  17 g Per G Tube Daily    tamsulosin  0.4 mg Oral Daily     PRN Meds:0.9%  NaCl infusion (for blood administration), 0.9%  NaCl infusion (for blood administration), acetaminophen, albuterol-ipratropium, dextrose 50%, dextrose 50%, glucagon (human recombinant), glucose, glucose, hydrALAZINE, insulin aspart U-100, magnesium oxide, magnesium oxide, ondansetron, potassium bicarbonate, potassium bicarbonate, potassium bicarbonate, potassium, sodium phosphates, potassium, sodium phosphates, potassium, sodium phosphates, sodium chloride 0.9%       Objective:     Vital Signs (Most Recent):  Temp: 97.8 °F (36.6 °C) (12/17/23 1114)  Pulse: 90 (12/17/23 1114)  Resp: 18 (12/17/23 1114)  BP: (!) 156/71 (12/17/23 1114)  SpO2: 96 % (12/17/23 1114) Vital Signs (24h Range):  Temp:  [97.2 °F (36.2 °C)-99.7 °F (37.6 °C)] 97.8 °F (36.6 °C)  Pulse:  [79-98] 90  Resp:  [17-20] 18  SpO2:  [96 %-100 %] 96 %  BP: (148-169)/(68-81) 156/71     Weight: 61.8 kg (136 lb 4.3 oz)  Body mass index is 27.52 kg/m².  Body surface area is 1.6 meters squared.      Intake/Output Summary (Last 24 hours) at 12/17/2023 1157  Last data filed at 12/17/2023 0534  Gross per 24 hour   Intake 1130 ml   Output 1200 ml   Net -70 ml          Physical  Exam  HENT:      Head: Normocephalic and atraumatic.   Eyes:      General: No scleral icterus.     Conjunctiva/sclera: Conjunctivae normal.   Cardiovascular:      Rate and Rhythm: Normal rate and regular rhythm.   Pulmonary:      Effort: Pulmonary effort is normal.      Breath sounds: Normal breath sounds.   Abdominal:      General: Abdomen is flat.      Palpations: Abdomen is soft.   Neurological:      Mental Status: She is alert.   Psychiatric:         Mood and Affect: Mood normal.          Significant Labs:   CBC:   Recent Labs   Lab 12/16/23  0507 12/16/23 2039 12/17/23 0457   WBC 17.53*  --  19.72*   HGB 6.4* 8.7* 8.3*   HCT 19.2* 25.8* 25.2*   PLT 1,041*  --  1,060*      and CMP:   Recent Labs   Lab 12/16/23  0507 12/17/23 0457    139   K 3.8 4.4    108   CO2 23 24   * 156*   BUN 17 19   CREATININE 0.6 0.6   CALCIUM 8.4* 8.8   PROT 6.4 6.8   ALBUMIN 2.8* 3.0*   BILITOT 0.4 0.7   ALKPHOS 161* 166*   AST 17 24   ALT 29 31   ANIONGAP 8 7*         Diagnostic Results:  None  Review of Systems

## 2023-12-17 NOTE — PROGRESS NOTES
[facility]  Hematology/Oncology  Progress Note    Patient Name: Steff Johnson  Admission Date: 12/5/2023  Hospital Length of Stay: 12 days  Code Status: Full Code     Subjective:     HPI:  Ms. Johnson is resting comfortably.                Interval History:     Oncology Treatment Plan:   [No matching plan found]    Medications:  Continuous Infusions:  Scheduled Meds:   amLODIPine  10 mg Oral Daily    apixaban  2.5 mg Per G Tube BID    aspirin  81 mg Per G Tube Daily    DAPTOmycin (CUBICIN) IV (PEDS and ADULTS)  8.1 mg/kg Intravenous Q24H    famotidine  20 mg Oral Daily    ferrous sulfate  300 mg Per G Tube Daily    insulin detemir U-100  35 Units Subcutaneous Daily    ipratropium  0.5 mg Nebulization Q6H WAKE    levetiracetam  500 mg Per G Tube BID    losartan  100 mg Per G Tube Daily    meropenem (MERREM) IVPB  1 g Intravenous Q8H    metoprolol tartrate  25 mg Per G Tube BID    micafungin (MYCAMINE) IVPB  100 mg Intravenous Q24H    polyethylene glycol  17 g Per G Tube Daily    tamsulosin  0.4 mg Oral Daily     PRN Meds:0.9%  NaCl infusion (for blood administration), 0.9%  NaCl infusion (for blood administration), acetaminophen, albuterol-ipratropium, dextrose 50%, dextrose 50%, glucagon (human recombinant), glucose, glucose, hydrALAZINE, insulin aspart U-100, magnesium oxide, magnesium oxide, ondansetron, potassium bicarbonate, potassium bicarbonate, potassium bicarbonate, potassium, sodium phosphates, potassium, sodium phosphates, potassium, sodium phosphates, sodium chloride 0.9%       Objective:     Vital Signs (Most Recent):  Temp: 97.8 °F (36.6 °C) (12/17/23 1114)  Pulse: 90 (12/17/23 1114)  Resp: 18 (12/17/23 1114)  BP: (!) 156/71 (12/17/23 1114)  SpO2: 96 % (12/17/23 1114) Vital Signs (24h Range):  Temp:  [97.2 °F (36.2 °C)-99.7 °F (37.6 °C)] 97.8 °F (36.6 °C)  Pulse:  [79-98] 90  Resp:  [17-20] 18  SpO2:  [96 %-100 %] 96 %  BP: (148-169)/(68-81) 156/71     Weight: 61.8 kg (136 lb 4.3  oz)  Body mass index is 27.52 kg/m².  Body surface area is 1.6 meters squared.      Intake/Output Summary (Last 24 hours) at 12/17/2023 1157  Last data filed at 12/17/2023 0534  Gross per 24 hour   Intake 1130 ml   Output 1200 ml   Net -70 ml          Physical Exam  HENT:      Head: Normocephalic and atraumatic.   Eyes:      General: No scleral icterus.     Conjunctiva/sclera: Conjunctivae normal.   Cardiovascular:      Rate and Rhythm: Normal rate and regular rhythm.   Pulmonary:      Effort: Pulmonary effort is normal.      Breath sounds: Normal breath sounds.   Abdominal:      General: Abdomen is flat.      Palpations: Abdomen is soft.   Neurological:      Mental Status: She is alert.   Psychiatric:         Mood and Affect: Mood normal.          Significant Labs:   CBC:   Recent Labs   Lab 12/16/23  0507 12/16/23 2039 12/17/23 0457   WBC 17.53*  --  19.72*   HGB 6.4* 8.7* 8.3*   HCT 19.2* 25.8* 25.2*   PLT 1,041*  --  1,060*      and CMP:   Recent Labs   Lab 12/16/23  0507 12/17/23 0457    139   K 3.8 4.4    108   CO2 23 24   * 156*   BUN 17 19   CREATININE 0.6 0.6   CALCIUM 8.4* 8.8   PROT 6.4 6.8   ALBUMIN 2.8* 3.0*   BILITOT 0.4 0.7   ALKPHOS 161* 166*   AST 17 24   ALT 29 31   ANIONGAP 8 7*         Diagnostic Results:  None  Review of Systems  Assessment/Plan:     Thrombocytosis  12/16/2023:  In addition to this elevated platelet count she also has an elevated WBC and rather severe anemia.  A detailed review of her available labs show that most of this seemed to begin over the summer of 2023 and has been slowly worsening.  Certainly her elevated platelet count seems to spike when she has an acute event such as an infection but this is a higher level than would normally be expected.  Given the finding of leucocytosis and anemia, I do have some concern that she may have a primary marrow myeloproliferative process.  KERLINE 2 is pending which evaluates for essential thrombocytosis, CML and  myeloproliferative disease in general but I will also add flow cytometry which would potentially detect other marrow issues such has other chronic or acute leukemias.      Also noted is a profound anemia of chronic diseases which certainly fits in her given situation.  No evidence of bleeding currently but need to continue to monitor for this.      12/17/2023:  Patient doing ok today.  Labs pending including Jak2 and flow cyto.  Will continue to follow.  Continue daily CBC for now.         Thank you for your consult. I will follow-up with patient. Please contact us if you have any additional questions.     Prashanth Maynard MD  Hematology/Oncology  [facility]

## 2023-12-17 NOTE — CARE UPDATE
12/17/23 0710   Patient Assessment/Suction   Level of Consciousness (AVPU) responds to voice   Respiratory Effort Unlabored;Normal   Expansion/Accessory Muscles/Retractions no use of accessory muscles   All Lung Fields Breath Sounds Anterior:;Lateral:;diminished;clear;equal bilaterally   Rhythm/Pattern, Respiratory depth regular   PRE-TX-O2   Device (Oxygen Therapy) room air   SpO2 99 %   Pulse Oximetry Type Intermittent   $ Pulse Oximetry - Multiple Charge Pulse Oximetry - Multiple   Pulse 97   Resp 20   Aerosol Therapy   $ Aerosol Therapy Charges Aerosol Treatment   Respiratory Treatment Status (SVN) given   Treatment Route (SVN) mask   Patient Position (SVN) HOB elevated   Post Treatment Assessment (SVN) breath sounds unchanged   Signs of Intolerance (SVN) none   Breath Sounds Post-Respiratory Treatment   Throughout All Fields Post-Treatment All Fields   Throughout All Fields Post-Treatment no change   Post-treatment Heart Rate (beats/min) 92   Post-treatment Resp Rate (breaths/min) 20   Education   $ Education Bronchodilator;15 min

## 2023-12-17 NOTE — SUBJECTIVE & OBJECTIVE
Interval History:  Platelet count this morning 1060.  WBC 19.72.  Patient does not appear in any distress this morning.    Review of Systems   Unable to perform ROS: Patient nonverbal     Objective:     Vital Signs (Most Recent):  Temp: 97.8 °F (36.6 °C) (12/17/23 1114)  Pulse: 90 (12/17/23 1114)  Resp: 18 (12/17/23 1114)  BP: (!) 156/71 (12/17/23 1114)  SpO2: 96 % (12/17/23 1114) Vital Signs (24h Range):  Temp:  [97.2 °F (36.2 °C)-99.7 °F (37.6 °C)] 97.8 °F (36.6 °C)  Pulse:  [79-98] 90  Resp:  [17-20] 18  SpO2:  [96 %-100 %] 96 %  BP: (148-169)/(68-81) 156/71     Weight: 61.8 kg (136 lb 4.3 oz)  Body mass index is 27.52 kg/m².    Intake/Output Summary (Last 24 hours) at 12/17/2023 1145  Last data filed at 12/17/2023 0534  Gross per 24 hour   Intake 1130 ml   Output 1200 ml   Net -70 ml         Physical Exam  Vitals reviewed.   Constitutional:       Appearance: She is ill-appearing.   HENT:      Head: Normocephalic and atraumatic.   Cardiovascular:      Rate and Rhythm: Normal rate.   Pulmonary:      Effort: No respiratory distress.      Breath sounds: No wheezing.   Abdominal:      General: There is no distension.      Tenderness: There is no abdominal tenderness.   Neurological:      Mental Status: She is alert. Mental status is at baseline.      Comments: Able to speak few words, minimally verbal,             Significant Labs: All pertinent labs within the past 24 hours have been reviewed.  CBC:   Recent Labs   Lab 12/16/23  0507 12/16/23 2039 12/17/23 0457   WBC 17.53*  --  19.72*   HGB 6.4* 8.7* 8.3*   HCT 19.2* 25.8* 25.2*   PLT 1,041*  --  1,060*     CMP:   Recent Labs   Lab 12/16/23  0507 12/17/23 0457    139   K 3.8 4.4    108   CO2 23 24   * 156*   BUN 17 19   CREATININE 0.6 0.6   CALCIUM 8.4* 8.8   PROT 6.4 6.8   ALBUMIN 2.8* 3.0*   BILITOT 0.4 0.7   ALKPHOS 161* 166*   AST 17 24   ALT 29 31   ANIONGAP 8 7*       Significant Imaging: I have reviewed all pertinent imaging  results/findings within the past 24 hours.

## 2023-12-17 NOTE — RESPIRATORY THERAPY
12/16/23 2059   Patient Assessment/Suction   Level of Consciousness (AVPU) responds to voice   Respiratory Effort Normal;Unlabored   Expansion/Accessory Muscles/Retractions no use of accessory muscles;no retractions   All Lung Fields Breath Sounds Anterior:;diminished   Rhythm/Pattern, Respiratory unlabored;pattern regular;depth regular   Cough Frequency no cough   PRE-TX-O2   Device (Oxygen Therapy) room air   SpO2 98 %   Pulse Oximetry Type Intermittent   $ Pulse Oximetry - Multiple Charge Pulse Oximetry - Multiple   Pulse 79   Resp 17   Positioning HOB elevated 30 degrees   Aerosol Therapy   $ Aerosol Therapy Charges Aerosol Treatment   Daily Review of Necessity (SVN) completed   Respiratory Treatment Status (SVN) given   Treatment Route (SVN) mask;oxygen   Patient Position (SVN) HOB elevated   Post Treatment Assessment (SVN) breath sounds unchanged   Signs of Intolerance (SVN) none   Education   $ Education Bronchodilator;15 min   Respiratory Evaluation   $ Care Plan Tech Time 15 min   $ Eval/Re-eval Charges Re-evaluation

## 2023-12-17 NOTE — ASSESSMENT & PLAN NOTE
Patient's anemia is currently controlled. Has not received any PRBCs to date. Etiology likely d/t Iron deficiency  Current CBC reviewed-   Lab Results   Component Value Date    HGB 8.3 (L) 12/17/2023    HCT 25.2 (L) 12/17/2023     Monitor serial CBC and transfuse if patient becomes hemodynamically unstable, symptomatic or H/H drops below 7/21.

## 2023-12-17 NOTE — PROGRESS NOTES
[facility] Medicine  Progress Note    Patient Name: Steff Johnson  MRN: [id number]  Patient Class: IP- Inpatient   Admission Date: 12/5/2023  Length of Stay: 12 days  Attending Physician: Amber Fernández MD  Primary Care Provider: Cristina Ren MD        Subjective:     Principal Problem:Severe sepsis        HPI:  Patient is a 63-year-old female with history of severe CVA with aphasia, upper and lower extremity weakness, bed-bound, cognitive impairment, hypertension, hyperlipidemia, type 2 diabetes, COPD presents to the emergency room for concerns of hematuria and hypoxia.  Patient nonverbal at baseline, history provided by ED physician.  Tried to contact daughter, was unsuccessful.  Per ED, patient presented from [facility], staff noticed bloody urine output from her chronic indwelling Mathur catheter as well as hypoxia.  Non-rebreather was placed on patient and this improved her oxygenation saturation.  Brought to ED by EMS.  Per ED physician patient did have episode of emesis, likely aspirating.    On admission temp 100.4, heart rate 111, O2 89%, WBC 40.96, hemoglobin 9.6, potassium 5.2, creatinine 1.7, lactic 3.8, procalcitonin 133.671.  Review of CXR did not show any obvious consolidations.  UA consistent with UTI.    Overview/Hospital Course:  Patient admitted for hematuria and hypoxia.  Was started on empiric antibiotics.  Blood cultures grew Proteus ESBL, Enterococcus, Candida glabrata.  Infectious Disease consulted.  Wound care consulted for chronic sacral wound.  Patient started on vancomycin, meropenem, micafungin.  Given positive blood cultures justyna was done which was negative for endocarditis/vegetation.  CT scan showed fluid collection in the gallbladder fossa, interventional Radiology consulted recommended no intervention at this time since this is stable compared to prior imaging.  Urology was consulted given concern of hydronephrosis on CT scan although repeat  retroperitoneal ultrasound showed no hydro nephrosis.  Cystoscopy done on 12/14/2023 showed no hydronephrosis, no intervention was done.  Repeat blood cultures on 12/10 have been negative so far.  Platelets noted to be elevated in the thousands, hematology consulted.  Patient had similar presentation during last episode of sepsis.  Likely secondary to infection, work up pending. Infectious Disease recommended IV antibiotics for 2 weeks.  Plan for discharge to LTAC once accepted.    Interval History:  Platelet count this morning 1060.  WBC 19.72.  Patient does not appear in any distress this morning.    Review of Systems   Unable to perform ROS: Patient nonverbal     Objective:     Vital Signs (Most Recent):  Temp: 97.8 °F (36.6 °C) (12/17/23 1114)  Pulse: 90 (12/17/23 1114)  Resp: 18 (12/17/23 1114)  BP: (!) 156/71 (12/17/23 1114)  SpO2: 96 % (12/17/23 1114) Vital Signs (24h Range):  Temp:  [97.2 °F (36.2 °C)-99.7 °F (37.6 °C)] 97.8 °F (36.6 °C)  Pulse:  [79-98] 90  Resp:  [17-20] 18  SpO2:  [96 %-100 %] 96 %  BP: (148-169)/(68-81) 156/71     Weight: 61.8 kg (136 lb 4.3 oz)  Body mass index is 27.52 kg/m².    Intake/Output Summary (Last 24 hours) at 12/17/2023 1145  Last data filed at 12/17/2023 0534  Gross per 24 hour   Intake 1130 ml   Output 1200 ml   Net -70 ml         Physical Exam  Vitals reviewed.   Constitutional:       Appearance: She is ill-appearing.   HENT:      Head: Normocephalic and atraumatic.   Cardiovascular:      Rate and Rhythm: Normal rate.   Pulmonary:      Effort: No respiratory distress.      Breath sounds: No wheezing.   Abdominal:      General: There is no distension.      Tenderness: There is no abdominal tenderness.   Neurological:      Mental Status: She is alert. Mental status is at baseline.      Comments: Able to speak few words, minimally verbal,             Significant Labs: All pertinent labs within the past 24 hours have been reviewed.  CBC:   Recent Labs   Lab 12/16/23  0007  12/16/23 2039 12/17/23  0457   WBC 17.53*  --  19.72*   HGB 6.4* 8.7* 8.3*   HCT 19.2* 25.8* 25.2*   PLT 1,041*  --  1,060*     CMP:   Recent Labs   Lab 12/16/23  0507 12/17/23  0457    139   K 3.8 4.4    108   CO2 23 24   * 156*   BUN 17 19   CREATININE 0.6 0.6   CALCIUM 8.4* 8.8   PROT 6.4 6.8   ALBUMIN 2.8* 3.0*   BILITOT 0.4 0.7   ALKPHOS 161* 166*   AST 17 24   ALT 29 31   ANIONGAP 8 7*       Significant Imaging: I have reviewed all pertinent imaging results/findings within the past 24 hours.    Assessment/Plan:      * Severe sepsis  This patient does have evidence of infective focus  My overall impression is sepsis.  Source: Respiratory and Urinary Tract  Antibiotics given-   Antibiotics (72h ago, onward)      Start     Stop Route Frequency Ordered    12/15/23 1500  DAPTOmycin (CUBICIN) 500 mg in sodium chloride 0.9% SolP 50 mL IVPB         -- IV Every 24 hours (non-standard times) 12/15/23 1408    12/08/23 2100  meropenem 1 g in sodium chloride 0.9 % 100 mL IVPB (ready to mix system)        Note to Pharmacy: Original order: meropenem 1 g Q 8 hours    -- IV Every 8 hours (non-standard times) 12/08/23 1627          Infectious disease consulted, continue vancomycin, meropenem, micafungin.  Blood cultures since 12/10 have been negative.  Rene negative for vegetation/endocarditis.  Per Infectious Disease follow up CT chest for right hilar lymphadenopathy resolution. Pt will need LTACH placement.  Infectious disease recommends 2 weeks of IV antibiotics till December 25th.  Follow up with Infectious Disease outpatient 3-4 weeks.      Hydronephrosis    Status post cystoscopy on 12/14/2023.  No hydronephrosis noted, no need for stents.    Chronic indwelling Mathur catheter  Noted, changed in the ED  ID recommends changing catheter every 2-3 weeks.      Blisters of multiple sites  Wound care consulted       Bedridden  Noted        Bacteremia due to Proteus species  Cont abx- refer to sepsis  plan      Acute respiratory failure with hypoxia  Patient with Hypoxic Respiratory failure which is Acute.  she is not on home oxygen. Supplemental oxygen was provided and noted-      .   Signs/symptoms of respiratory failure include- tachypnea and increased work of breathing. Contributing diagnoses includes - Aspiration Labs and images were reviewed. Patient Has not had a recent ABG. Will treat underlying causes and adjust management of respiratory failure as follows- abx, supplemental o2 as needed    Resolving     Leucocytosis  Resolving      Hyperkalemia  This patient has hyperkalemia which is uncontrolled. We will monitor for arrhythmias with EKG or continuous telemetry. We will treat the hyperkalemia with IV fluids, follow up repeat BMP. The likely etiology of the hyperkalemia is COURTNEY.  The patients latest potassium has been reviewed and the results are listed below  Recent Labs   Lab 12/17/23  0457   K 4.4               UTI (urinary tract infection)  Urine cultures grew Proteus mirabilis ESBL on admission  Continue with IV antibiotics   Follow up CBC and CMP  Urine culture negative on 12/11/2023      Pressure injury of sacral region, stage 4  Wound care consulted  Rec: Triad + mepilex to the BL medial thighs. Pack sacral wound with silver alginate rope      Anemia  Patient's anemia is currently controlled. Has not received any PRBCs to date. Etiology likely d/t Iron deficiency  Current CBC reviewed-   Lab Results   Component Value Date    HGB 8.3 (L) 12/17/2023    HCT 25.2 (L) 12/17/2023     Monitor serial CBC and transfuse if patient becomes hemodynamically unstable, symptomatic or H/H drops below 7/21.    Thrombocytosis  Likely secondary to infection.  Bilateral upper and lower extremity ultrasounds negative for DVT.  Hematology consulted.  Patient had similar elevation and platelets in October during sepsis.  will evaluate for myeloproliferative state in order a JAK2 test per Hematology.  Continue  Eliquis.      Aphasia  Noted      Cognitive communication deficit  Chronic issue      Combined forms of age-related cataract, bilateral  Aware      COURTNEY (acute kidney injury)  Patient with acute kidney injury/acute renal failure likely due to pre-renal azotemia due to dehydration COURTNEY is currently stable. Baseline creatinine  0.6  - Labs reviewed- Renal function/electrolytes with Estimated Creatinine Clearance: 79.7 mL/min (based on SCr of 0.6 mg/dL). according to latest data. Monitor urine output and serial BMP and adjust therapy as needed. Avoid nephrotoxins and renally dose meds for GFR listed above.    Resolving       CVA (cerebral vascular accident)  History of CVA, nonverbal and bed-bound at baseline  Has PEG tube, nutrition consulted for tube feedings.    Has chronic indwelling catheter, replaced in the emergency room.      Mixed hyperlipidemia  Continue outpatient statin      Hyperglycemia due to type 2 diabetes mellitus  Continue Levemir, cont patient on sliding scale      Hypertension associated with diabetes  Continue outpatient antihypertensives, monitor blood pressure        VTE Risk Mitigation (From admission, onward)           Ordered     apixaban tablet 2.5 mg  2 times daily         12/05/23 2300     IP VTE HIGH RISK PATIENT  Once         12/05/23 2300     Place sequential compression device  Until discontinued         12/05/23 2300                    Discharge Planning   ALEX: 12/18/2023     Code Status: Full Code   Is the patient medically ready for discharge?:     Reason for patient still in hospital (select all that apply): Patient unstable  Discharge Plan A: Long-term acute care facility (LTAC)                  Amber Fernández MD  Department of Hospital Medicine   [facility]

## 2023-12-18 ENCOUNTER — PATIENT MESSAGE (OUTPATIENT)
Dept: ADMINISTRATIVE | Facility: HOSPITAL | Age: 63
End: 2023-12-18
Payer: MEDICARE

## 2023-12-18 LAB
ALBUMIN SERPL BCP-MCNC: 2.9 G/DL (ref 3.5–5.2)
ALP SERPL-CCNC: 161 U/L (ref 55–135)
ALT SERPL W/O P-5'-P-CCNC: 33 U/L (ref 10–44)
ANION GAP SERPL CALC-SCNC: 9 MMOL/L (ref 8–16)
AST SERPL-CCNC: 24 U/L (ref 10–40)
BASOPHILS # BLD AUTO: 0.11 K/UL (ref 0–0.2)
BASOPHILS NFR BLD: 0.6 % (ref 0–1.9)
BILIRUB SERPL-MCNC: 0.5 MG/DL (ref 0.1–1)
BUN SERPL-MCNC: 22 MG/DL (ref 8–23)
CALCIUM SERPL-MCNC: 8.9 MG/DL (ref 8.7–10.5)
CHLORIDE SERPL-SCNC: 108 MMOL/L (ref 95–110)
CO2 SERPL-SCNC: 24 MMOL/L (ref 23–29)
CREAT SERPL-MCNC: 0.6 MG/DL (ref 0.5–1.4)
DIFFERENTIAL METHOD BLD: ABNORMAL
EOSINOPHIL # BLD AUTO: 0.4 K/UL (ref 0–0.5)
EOSINOPHIL NFR BLD: 2.1 % (ref 0–8)
ERYTHROCYTE [DISTWIDTH] IN BLOOD BY AUTOMATED COUNT: 21.8 % (ref 11.5–14.5)
EST. GFR  (NO RACE VARIABLE): >60 ML/MIN/1.73 M^2
GLUCOSE SERPL-MCNC: 127 MG/DL (ref 70–110)
GLUCOSE SERPL-MCNC: 141 MG/DL (ref 70–110)
GLUCOSE SERPL-MCNC: 148 MG/DL (ref 70–110)
GLUCOSE SERPL-MCNC: 150 MG/DL (ref 70–110)
GLUCOSE SERPL-MCNC: 88 MG/DL (ref 70–110)
GLUCOSE SERPL-MCNC: 92 MG/DL (ref 70–110)
HCT VFR BLD AUTO: 25.9 % (ref 37–48.5)
HGB BLD-MCNC: 8.6 G/DL (ref 12–16)
IMM GRANULOCYTES # BLD AUTO: 0.33 K/UL (ref 0–0.04)
IMM GRANULOCYTES NFR BLD AUTO: 1.9 % (ref 0–0.5)
LYMPHOCYTES # BLD AUTO: 2.3 K/UL (ref 1–4.8)
LYMPHOCYTES NFR BLD: 12.9 % (ref 18–48)
MAGNESIUM SERPL-MCNC: 2.1 MG/DL (ref 1.6–2.6)
MCH RBC QN AUTO: 26 PG (ref 27–31)
MCHC RBC AUTO-ENTMCNC: 33.2 G/DL (ref 32–36)
MCV RBC AUTO: 78 FL (ref 82–98)
MONOCYTES # BLD AUTO: 1.6 K/UL (ref 0.3–1)
MONOCYTES NFR BLD: 9 % (ref 4–15)
NEUTROPHILS # BLD AUTO: 12.8 K/UL (ref 1.8–7.7)
NEUTROPHILS NFR BLD: 73.5 % (ref 38–73)
NRBC BLD-RTO: 0 /100 WBC
PLATELET # BLD AUTO: 1046 K/UL (ref 150–450)
PMV BLD AUTO: 8.9 FL (ref 9.2–12.9)
POTASSIUM SERPL-SCNC: 4.5 MMOL/L (ref 3.5–5.1)
PROT SERPL-MCNC: 6.8 G/DL (ref 6–8.4)
RBC # BLD AUTO: 3.31 M/UL (ref 4–5.4)
SODIUM SERPL-SCNC: 141 MMOL/L (ref 136–145)
WBC # BLD AUTO: 17.48 K/UL (ref 3.9–12.7)

## 2023-12-18 PROCEDURE — 80053 COMPREHEN METABOLIC PANEL: CPT | Performed by: NURSE PRACTITIONER

## 2023-12-18 PROCEDURE — 25000242 PHARM REV CODE 250 ALT 637 W/ HCPCS: Performed by: STUDENT IN AN ORGANIZED HEALTH CARE EDUCATION/TRAINING PROGRAM

## 2023-12-18 PROCEDURE — 83735 ASSAY OF MAGNESIUM: CPT | Performed by: STUDENT IN AN ORGANIZED HEALTH CARE EDUCATION/TRAINING PROGRAM

## 2023-12-18 PROCEDURE — 94761 N-INVAS EAR/PLS OXIMETRY MLT: CPT

## 2023-12-18 PROCEDURE — 36415 COLL VENOUS BLD VENIPUNCTURE: CPT | Performed by: STUDENT IN AN ORGANIZED HEALTH CARE EDUCATION/TRAINING PROGRAM

## 2023-12-18 PROCEDURE — 25000003 PHARM REV CODE 250: Performed by: INTERNAL MEDICINE

## 2023-12-18 PROCEDURE — 99231 SBSQ HOSP IP/OBS SF/LOW 25: CPT | Mod: ,,, | Performed by: INTERNAL MEDICINE

## 2023-12-18 PROCEDURE — 12000002 HC ACUTE/MED SURGE SEMI-PRIVATE ROOM

## 2023-12-18 PROCEDURE — 63600175 PHARM REV CODE 636 W HCPCS: Performed by: NURSE PRACTITIONER

## 2023-12-18 PROCEDURE — 25000003 PHARM REV CODE 250: Performed by: NURSE PRACTITIONER

## 2023-12-18 PROCEDURE — 63600175 PHARM REV CODE 636 W HCPCS: Performed by: INTERNAL MEDICINE

## 2023-12-18 PROCEDURE — 99900031 HC PATIENT EDUCATION (STAT)

## 2023-12-18 PROCEDURE — 85025 COMPLETE CBC W/AUTO DIFF WBC: CPT | Performed by: STUDENT IN AN ORGANIZED HEALTH CARE EDUCATION/TRAINING PROGRAM

## 2023-12-18 PROCEDURE — 94640 AIRWAY INHALATION TREATMENT: CPT

## 2023-12-18 PROCEDURE — 25000003 PHARM REV CODE 250: Performed by: STUDENT IN AN ORGANIZED HEALTH CARE EDUCATION/TRAINING PROGRAM

## 2023-12-18 PROCEDURE — 94799 UNLISTED PULMONARY SVC/PX: CPT

## 2023-12-18 PROCEDURE — 99900035 HC TECH TIME PER 15 MIN (STAT)

## 2023-12-18 RX ADMIN — MEROPENEM 1 G: 1 INJECTION, POWDER, FOR SOLUTION INTRAVENOUS at 08:12

## 2023-12-18 RX ADMIN — FAMOTIDINE 20 MG: 20 TABLET ORAL at 08:12

## 2023-12-18 RX ADMIN — MEROPENEM 1 G: 1 INJECTION, POWDER, FOR SOLUTION INTRAVENOUS at 12:12

## 2023-12-18 RX ADMIN — IPRATROPIUM BROMIDE 0.5 MG: 0.5 SOLUTION RESPIRATORY (INHALATION) at 08:12

## 2023-12-18 RX ADMIN — APIXABAN 2.5 MG: 2.5 TABLET, FILM COATED ORAL at 10:12

## 2023-12-18 RX ADMIN — LOSARTAN POTASSIUM 100 MG: 50 TABLET, FILM COATED ORAL at 08:12

## 2023-12-18 RX ADMIN — DAPTOMYCIN 500 MG: 500 INJECTION, POWDER, LYOPHILIZED, FOR SOLUTION INTRAVENOUS at 03:12

## 2023-12-18 RX ADMIN — MINERAL SUPPLEMENT IRON 300 MG / 5 ML STRENGTH LIQUID 100 PER BOX UNFLAVORED 300 MG: at 08:12

## 2023-12-18 RX ADMIN — LEVETIRACETAM 500 MG: 100 SOLUTION ORAL at 10:12

## 2023-12-18 RX ADMIN — INSULIN DETEMIR 35 UNITS: 100 INJECTION, SOLUTION SUBCUTANEOUS at 08:12

## 2023-12-18 RX ADMIN — METOPROLOL TARTRATE 25 MG: 25 TABLET, FILM COATED ORAL at 08:12

## 2023-12-18 RX ADMIN — POLYETHYLENE GLYCOL 3350 17 G: 17 POWDER, FOR SOLUTION ORAL at 08:12

## 2023-12-18 RX ADMIN — AMLODIPINE BESYLATE 10 MG: 5 TABLET ORAL at 08:12

## 2023-12-18 RX ADMIN — METOPROLOL TARTRATE 25 MG: 25 TABLET, FILM COATED ORAL at 10:12

## 2023-12-18 RX ADMIN — IPRATROPIUM BROMIDE 0.5 MG: 0.5 SOLUTION RESPIRATORY (INHALATION) at 07:12

## 2023-12-18 RX ADMIN — ASPIRIN 81 MG 81 MG: 81 TABLET ORAL at 08:12

## 2023-12-18 RX ADMIN — TAMSULOSIN HYDROCHLORIDE 0.4 MG: 0.4 CAPSULE ORAL at 08:12

## 2023-12-18 RX ADMIN — APIXABAN 2.5 MG: 2.5 TABLET, FILM COATED ORAL at 08:12

## 2023-12-18 RX ADMIN — MICAFUNGIN SODIUM 100 MG: 100 INJECTION, POWDER, LYOPHILIZED, FOR SOLUTION INTRAVENOUS at 10:12

## 2023-12-18 RX ADMIN — IPRATROPIUM BROMIDE 0.5 MG: 0.5 SOLUTION RESPIRATORY (INHALATION) at 01:12

## 2023-12-18 RX ADMIN — LEVETIRACETAM 500 MG: 100 SOLUTION ORAL at 08:12

## 2023-12-18 RX ADMIN — MEROPENEM 1 G: 1 INJECTION, POWDER, FOR SOLUTION INTRAVENOUS at 03:12

## 2023-12-18 NOTE — ASSESSMENT & PLAN NOTE
This patient has hyperkalemia which is uncontrolled. We will monitor for arrhythmias with EKG or continuous telemetry. We will treat the hyperkalemia with IV fluids, follow up repeat BMP. The likely etiology of the hyperkalemia is COURTNEY.  The patients latest potassium has been reviewed and the results are listed below  Recent Labs   Lab 12/18/23  0503   K 4.5

## 2023-12-18 NOTE — CARE UPDATE
12/18/23 0735   Patient Assessment/Suction   Level of Consciousness (AVPU) responds to voice   Respiratory Effort Unlabored;Normal   Expansion/Accessory Muscles/Retractions expansion symmetric;no use of accessory muscles;no retractions   All Lung Fields Breath Sounds diminished   PRE-TX-O2   Device (Oxygen Therapy) room air   SpO2 96 %   Pulse Oximetry Type Intermittent   $ Pulse Oximetry - Multiple Charge Pulse Oximetry - Multiple   Pulse 86   Resp 18   Aerosol Therapy   $ Aerosol Therapy Charges Aerosol Treatment   Daily Review of Necessity (SVN) completed   Respiratory Treatment Status (SVN) given   Treatment Route (SVN) mask;oxygen   Patient Position (SVN) semi-Berman's   Post Treatment Assessment (SVN) breath sounds unchanged   Signs of Intolerance (SVN) none   Education   $ Education Bronchodilator;15 min   Respiratory Evaluation   $ Care Plan Tech Time 15 min   $ Eval/Re-eval Charges Re-evaluation

## 2023-12-18 NOTE — CARE UPDATE
12/17/23 1959   Patient Assessment/Suction   Level of Consciousness (AVPU) responds to voice   Respiratory Effort Unlabored   Expansion/Accessory Muscles/Retractions no use of accessory muscles   All Lung Fields Breath Sounds clear;equal bilaterally;diminished   Rhythm/Pattern, Respiratory no shortness of breath reported   Cough Frequency no cough   PRE-TX-O2   Device (Oxygen Therapy) room air   SpO2 100 %   Pulse Oximetry Type Intermittent   $ Pulse Oximetry - Multiple Charge Pulse Oximetry - Multiple   Pulse 88   Resp 15   Positioning   Head of Bed (HOB) Positioning HOB elevated;HOB at 30 degrees   Aerosol Therapy   $ Aerosol Therapy Charges Aerosol Treatment   Daily Review of Necessity (SVN) other (see comments)   Respiratory Treatment Status (SVN) given   Treatment Route (SVN) mask   Patient Position (SVN) semi-Berman's   Post Treatment Assessment (SVN) breath sounds unchanged   Signs of Intolerance (SVN) none   Breath Sounds Post-Respiratory Treatment   Throughout All Fields Post-Treatment All Fields   Throughout All Fields Post-Treatment aeration increased   Post-treatment Heart Rate (beats/min) 91   Post-treatment Resp Rate (breaths/min) 15   Education   $ Education 15 min;Bronchodilator   Respiratory Evaluation   $ Care Plan Tech Time 15 min   $ Eval/Re-eval Charges Re-evaluation

## 2023-12-18 NOTE — PROGRESS NOTES
[facility] Medicine  Progress Note    Patient Name: Steff Johnson  MRN: [id number]  Patient Class: IP- Inpatient   Admission Date: 12/5/2023  Length of Stay: 13 days  Attending Physician: Amber Fernández MD  Primary Care Provider: Cristina Ren MD        Subjective:     Principal Problem:Severe sepsis        HPI:  Patient is a 63-year-old female with history of severe CVA with aphasia, upper and lower extremity weakness, bed-bound, cognitive impairment, hypertension, hyperlipidemia, type 2 diabetes, COPD presents to the emergency room for concerns of hematuria and hypoxia.  Patient nonverbal at baseline, history provided by ED physician.  Tried to contact daughter, was unsuccessful.  Per ED, patient presented from [facility], staff noticed bloody urine output from her chronic indwelling Mathur catheter as well as hypoxia.  Non-rebreather was placed on patient and this improved her oxygenation saturation.  Brought to ED by EMS.  Per ED physician patient did have episode of emesis, likely aspirating.    On admission temp 100.4, heart rate 111, O2 89%, WBC 40.96, hemoglobin 9.6, potassium 5.2, creatinine 1.7, lactic 3.8, procalcitonin 133.671.  Review of CXR did not show any obvious consolidations.  UA consistent with UTI.    Overview/Hospital Course:  Patient admitted for hematuria and hypoxia.  Was started on empiric antibiotics.  Blood cultures grew Proteus ESBL, Enterococcus, Candida glabrata.  Infectious Disease consulted.  Wound care consulted for chronic sacral wound.  Patient started on vancomycin, meropenem, micafungin.  Given positive blood cultures justyna was done which was negative for endocarditis/vegetation.  CT scan showed fluid collection in the gallbladder fossa, interventional Radiology consulted recommended no intervention at this time since this is stable compared to prior imaging.  Urology was consulted given concern of hydronephrosis on CT scan although repeat  retroperitoneal ultrasound showed no hydro nephrosis.  Cystoscopy done on 12/14/2023 showed no hydronephrosis, no intervention was done.  Repeat blood cultures on 12/10 have been negative so far.  Platelets noted to be elevated in the thousands, hematology consulted.  Patient had similar presentation during last episode of sepsis.  Likely secondary to infection, work up pending. Infectious Disease recommended IV antibiotics for 2 weeks.  Plan for discharge to LTAC once accepted.    Interval History:  Thrombocytosis workup still pending.  WBC downtrending.  Platelets 1046.    Review of Systems   Unable to perform ROS: Patient nonverbal     Objective:     Vital Signs (Most Recent):  Temp: 99 °F (37.2 °C) (12/18/23 1100)  Pulse: 75 (12/18/23 1100)  Resp: 18 (12/18/23 1100)  BP: (!) 154/73 (12/18/23 1100)  SpO2: 97 % (12/18/23 1100) Vital Signs (24h Range):  Temp:  [97.4 °F (36.3 °C)-99.1 °F (37.3 °C)] 99 °F (37.2 °C)  Pulse:  [75-95] 75  Resp:  [15-20] 18  SpO2:  [95 %-100 %] 97 %  BP: (139-161)/(66-85) 154/73     Weight: 61.8 kg (136 lb 4.3 oz)  Body mass index is 27.52 kg/m².    Intake/Output Summary (Last 24 hours) at 12/18/2023 1232  Last data filed at 12/18/2023 0957  Gross per 24 hour   Intake 1106 ml   Output 1450 ml   Net -344 ml         Physical Exam  Vitals reviewed.   Constitutional:       Appearance: She is ill-appearing.   HENT:      Head: Normocephalic and atraumatic.   Cardiovascular:      Rate and Rhythm: Normal rate.   Pulmonary:      Effort: No respiratory distress.      Breath sounds: No wheezing.   Abdominal:      General: There is no distension.      Tenderness: There is no abdominal tenderness.   Neurological:      Mental Status: She is alert. Mental status is at baseline.      Comments: Able to speak few words, minimally verbal,             Significant Labs: All pertinent labs within the past 24 hours have been reviewed.  CBC:   Recent Labs   Lab 12/16/23  2039 12/17/23  0457 12/18/23  0503   WBC   --  19.72* 17.48*   HGB 8.7* 8.3* 8.6*   HCT 25.8* 25.2* 25.9*   PLT  --  1,060* 1,046*     CMP:   Recent Labs   Lab 12/17/23  0457 12/18/23  0503    141   K 4.4 4.5    108   CO2 24 24   * 150*   BUN 19 22   CREATININE 0.6 0.6   CALCIUM 8.8 8.9   PROT 6.8 6.8   ALBUMIN 3.0* 2.9*   BILITOT 0.7 0.5   ALKPHOS 166* 161*   AST 24 24   ALT 31 33   ANIONGAP 7* 9       Significant Imaging: I have reviewed all pertinent imaging results/findings within the past 24 hours.    Assessment/Plan:      * Severe sepsis  This patient does have evidence of infective focus  My overall impression is sepsis.  Source: Respiratory and Urinary Tract  Antibiotics given-   Antibiotics (72h ago, onward)      Start     Stop Route Frequency Ordered    12/15/23 1500  DAPTOmycin (CUBICIN) 500 mg in sodium chloride 0.9% SolP 50 mL IVPB         -- IV Every 24 hours (non-standard times) 12/15/23 1408    12/08/23 2100  meropenem 1 g in sodium chloride 0.9 % 100 mL IVPB (ready to mix system)        Note to Pharmacy: Original order: meropenem 1 g Q 8 hours    -- IV Every 8 hours (non-standard times) 12/08/23 1627          Infectious disease consulted, continue vancomycin, meropenem, micafungin.  Blood cultures since 12/10 have been negative.  Rene negative for vegetation/endocarditis.  Per Infectious Disease follow up CT chest for right hilar lymphadenopathy resolution. Pt will need LTACH placement.  Infectious disease recommends 2 weeks of IV antibiotics till December 25th.  Follow up with Infectious Disease outpatient 3-4 weeks.      Hydronephrosis    Status post cystoscopy on 12/14/2023.  No hydronephrosis noted, no need for stents.    Chronic indwelling Mathur catheter  Noted, changed in the ED  ID recommends changing catheter every 2-3 weeks.      Blisters of multiple sites  Wound care consulted       Bedridden  Noted        Bacteremia due to Proteus species  Cont abx- refer to sepsis plan      Acute respiratory failure with  hypoxia  Patient with Hypoxic Respiratory failure which is Acute.  she is not on home oxygen. Supplemental oxygen was provided and noted-      .   Signs/symptoms of respiratory failure include- tachypnea and increased work of breathing. Contributing diagnoses includes - Aspiration Labs and images were reviewed. Patient Has not had a recent ABG. Will treat underlying causes and adjust management of respiratory failure as follows- abx, supplemental o2 as needed    Resolving     Leucocytosis  Resolving      Hyperkalemia  This patient has hyperkalemia which is uncontrolled. We will monitor for arrhythmias with EKG or continuous telemetry. We will treat the hyperkalemia with IV fluids, follow up repeat BMP. The likely etiology of the hyperkalemia is COURTNEY.  The patients latest potassium has been reviewed and the results are listed below  Recent Labs   Lab 12/18/23  0503   K 4.5               UTI (urinary tract infection)  Urine cultures grew Proteus mirabilis ESBL on admission  Continue with IV antibiotics   Follow up CBC and CMP  Urine culture negative on 12/11/2023      Pressure injury of sacral region, stage 4  Wound care consulted  Rec: Triad + mepilex to the BL medial thighs. Pack sacral wound with silver alginate rope      Anemia  Patient's anemia is currently controlled. Has not received any PRBCs to date. Etiology likely d/t Iron deficiency  Current CBC reviewed-   Lab Results   Component Value Date    HGB 8.6 (L) 12/18/2023    HCT 25.9 (L) 12/18/2023     Monitor serial CBC and transfuse if patient becomes hemodynamically unstable, symptomatic or H/H drops below 7/21.    Thrombocytosis  Likely secondary to infection.  Bilateral upper and lower extremity ultrasounds negative for DVT.  Hematology consulted.  Patient had similar elevation and platelets in October during sepsis.  will evaluate for myeloproliferative state in order a JAK2 test per Hematology.  Continue Eliquis.      Aphasia  Noted      Cognitive  communication deficit  Chronic issue      Combined forms of age-related cataract, bilateral  Aware      COURTNEY (acute kidney injury)  Patient with acute kidney injury/acute renal failure likely due to pre-renal azotemia due to dehydration COURTNEY is currently stable. Baseline creatinine  0.6  - Labs reviewed- Renal function/electrolytes with Estimated Creatinine Clearance: 79.7 mL/min (based on SCr of 0.6 mg/dL). according to latest data. Monitor urine output and serial BMP and adjust therapy as needed. Avoid nephrotoxins and renally dose meds for GFR listed above.    Resolving       CVA (cerebral vascular accident)  History of CVA, nonverbal and bed-bound at baseline  Has PEG tube, nutrition consulted for tube feedings.    Has chronic indwelling catheter, replaced in the emergency room.      Mixed hyperlipidemia  Continue outpatient statin      Hyperglycemia due to type 2 diabetes mellitus  Continue Levemir, cont patient on sliding scale      Hypertension associated with diabetes  Continue outpatient antihypertensives, monitor blood pressure        VTE Risk Mitigation (From admission, onward)           Ordered     apixaban tablet 2.5 mg  2 times daily         12/05/23 2300     IP VTE HIGH RISK PATIENT  Once         12/05/23 2300     Place sequential compression device  Until discontinued         12/05/23 2300                    Discharge Planning   ALEX: 12/19/2023     Code Status: Full Code   Is the patient medically ready for discharge?:     Reason for patient still in hospital (select all that apply): Patient unstable  Discharge Plan A: Long-term acute care facility (LTAC)                  Amber Fernández MD  Department of Hospital Medicine   [facility]

## 2023-12-18 NOTE — ASSESSMENT & PLAN NOTE
12/16/2023:  In addition to this elevated platelet count she also has an elevated WBC and rather severe anemia.  A detailed review of her available labs show that most of this seemed to begin over the summer of 2023 and has been slowly worsening.  Certainly her elevated platelet count seems to spike when she has an acute event such as an infection but this is a higher level than would normally be expected.  Given the finding of leucocytosis and anemia, I do have some concern that she may have a primary marrow myeloproliferative process.  NESTOR 2 is pending which evaluates for essential thrombocytosis, CML and myeloproliferative disease in general but I will also add flow cytometry which would potentially detect other marrow issues such has other chronic or acute leukemias.      Also noted is a profound anemia of chronic diseases which certainly fits in her given situation.  No evidence of bleeding currently but need to continue to monitor for this.      12/17/2023:  Patient doing ok today.  Labs pending including Jak2 and flow cyto.  Will continue to follow.  Continue daily CBC for now.     12/18/2023:  Counts remain stable at this time.  Flow and Nestor-2 still pending.  No new issues this morning.  Continue current care approach.

## 2023-12-18 NOTE — ASSESSMENT & PLAN NOTE
Patient's anemia is currently controlled. Has not received any PRBCs to date. Etiology likely d/t Iron deficiency  Current CBC reviewed-   Lab Results   Component Value Date    HGB 8.6 (L) 12/18/2023    HCT 25.9 (L) 12/18/2023     Monitor serial CBC and transfuse if patient becomes hemodynamically unstable, symptomatic or H/H drops below 7/21.

## 2023-12-18 NOTE — SUBJECTIVE & OBJECTIVE
Interval History:     Oncology Treatment Plan:   [No matching plan found]    Medications:  Continuous Infusions:  Scheduled Meds:   amLODIPine  10 mg Oral Daily    apixaban  2.5 mg Per G Tube BID    aspirin  81 mg Per G Tube Daily    DAPTOmycin (CUBICIN) IV (PEDS and ADULTS)  8.1 mg/kg Intravenous Q24H    famotidine  20 mg Oral Daily    ferrous sulfate  300 mg Per G Tube Daily    insulin detemir U-100  35 Units Subcutaneous Daily    ipratropium  0.5 mg Nebulization Q6H WAKE    levetiracetam  500 mg Per G Tube BID    losartan  100 mg Per G Tube Daily    meropenem (MERREM) IVPB  1 g Intravenous Q8H    metoprolol tartrate  25 mg Per G Tube BID    micafungin (MYCAMINE) IVPB  100 mg Intravenous Q24H    polyethylene glycol  17 g Per G Tube Daily    tamsulosin  0.4 mg Oral Daily     PRN Meds:0.9%  NaCl infusion (for blood administration), 0.9%  NaCl infusion (for blood administration), acetaminophen, albuterol-ipratropium, dextrose 50%, dextrose 50%, glucagon (human recombinant), glucose, glucose, hydrALAZINE, insulin aspart U-100, magnesium oxide, magnesium oxide, ondansetron, potassium bicarbonate, potassium bicarbonate, potassium bicarbonate, potassium, sodium phosphates, potassium, sodium phosphates, potassium, sodium phosphates, sodium chloride 0.9%       Objective:     Vital Signs (Most Recent):  Temp: 97.5 °F (36.4 °C) (12/18/23 0451)  Pulse: 88 (12/18/23 0451)  Resp: 18 (12/18/23 0451)  BP: (!) 148/66 (12/18/23 0451)  SpO2: 99 % (12/18/23 0451) Vital Signs (24h Range):  Temp:  [97.4 °F (36.3 °C)-99.1 °F (37.3 °C)] 97.5 °F (36.4 °C)  Pulse:  [84-95] 88  Resp:  [15-20] 18  SpO2:  [96 %-100 %] 99 %  BP: (139-156)/(66-71) 148/66     Weight: 61.8 kg (136 lb 4.3 oz)  Body mass index is 27.52 kg/m².  Body surface area is 1.6 meters squared.      Intake/Output Summary (Last 24 hours) at 12/18/2023 0718  Last data filed at 12/18/2023 0455  Gross per 24 hour   Intake 1106 ml   Output 1450 ml   Net -344 ml          Physical  Exam  HENT:      Head: Normocephalic and atraumatic.   Eyes:      General: No scleral icterus.     Conjunctiva/sclera: Conjunctivae normal.   Cardiovascular:      Rate and Rhythm: Normal rate and regular rhythm.   Pulmonary:      Effort: Pulmonary effort is normal.      Breath sounds: Normal breath sounds.   Abdominal:      General: Abdomen is flat.      Palpations: Abdomen is soft.   Neurological:      Mental Status: She is alert.   Psychiatric:         Mood and Affect: Mood normal.          Significant Labs:   CBC:   Recent Labs   Lab 12/16/23 2039 12/17/23  0457 12/18/23  0503   WBC  --  19.72* 17.48*   HGB 8.7* 8.3* 8.6*   HCT 25.8* 25.2* 25.9*   PLT  --  1,060* 1,046*      and CMP:   Recent Labs   Lab 12/17/23 0457 12/18/23  0503    141   K 4.4 4.5    108   CO2 24 24   * 150*   BUN 19 22   CREATININE 0.6 0.6   CALCIUM 8.8 8.9   PROT 6.8 6.8   ALBUMIN 3.0* 2.9*   BILITOT 0.7 0.5   ALKPHOS 166* 161*   AST 24 24   ALT 31 33   ANIONGAP 7* 9         Diagnostic Results:  None  Review of Systems

## 2023-12-18 NOTE — PROGRESS NOTES
[facility]  Hematology/Oncology  Progress Note    Patient Name: Steff Johnson  Admission Date: 12/5/2023  Hospital Length of Stay: 13 days  Code Status: Full Code     Subjective:     HPI:  Ms. Johnson is awake and alert this morning.                Interval History:     Oncology Treatment Plan:   [No matching plan found]    Medications:  Continuous Infusions:  Scheduled Meds:   amLODIPine  10 mg Oral Daily    apixaban  2.5 mg Per G Tube BID    aspirin  81 mg Per G Tube Daily    DAPTOmycin (CUBICIN) IV (PEDS and ADULTS)  8.1 mg/kg Intravenous Q24H    famotidine  20 mg Oral Daily    ferrous sulfate  300 mg Per G Tube Daily    insulin detemir U-100  35 Units Subcutaneous Daily    ipratropium  0.5 mg Nebulization Q6H WAKE    levetiracetam  500 mg Per G Tube BID    losartan  100 mg Per G Tube Daily    meropenem (MERREM) IVPB  1 g Intravenous Q8H    metoprolol tartrate  25 mg Per G Tube BID    micafungin (MYCAMINE) IVPB  100 mg Intravenous Q24H    polyethylene glycol  17 g Per G Tube Daily    tamsulosin  0.4 mg Oral Daily     PRN Meds:0.9%  NaCl infusion (for blood administration), 0.9%  NaCl infusion (for blood administration), acetaminophen, albuterol-ipratropium, dextrose 50%, dextrose 50%, glucagon (human recombinant), glucose, glucose, hydrALAZINE, insulin aspart U-100, magnesium oxide, magnesium oxide, ondansetron, potassium bicarbonate, potassium bicarbonate, potassium bicarbonate, potassium, sodium phosphates, potassium, sodium phosphates, potassium, sodium phosphates, sodium chloride 0.9%       Objective:     Vital Signs (Most Recent):  Temp: 97.5 °F (36.4 °C) (12/18/23 0451)  Pulse: 88 (12/18/23 0451)  Resp: 18 (12/18/23 0451)  BP: (!) 148/66 (12/18/23 0451)  SpO2: 99 % (12/18/23 0451) Vital Signs (24h Range):  Temp:  [97.4 °F (36.3 °C)-99.1 °F (37.3 °C)] 97.5 °F (36.4 °C)  Pulse:  [84-95] 88  Resp:  [15-20] 18  SpO2:  [96 %-100 %] 99 %  BP: (139-156)/(66-71) 148/66     Weight: 61.8 kg (136 lb 4.3  oz)  Body mass index is 27.52 kg/m².  Body surface area is 1.6 meters squared.      Intake/Output Summary (Last 24 hours) at 12/18/2023 0718  Last data filed at 12/18/2023 0455  Gross per 24 hour   Intake 1106 ml   Output 1450 ml   Net -344 ml          Physical Exam  HENT:      Head: Normocephalic and atraumatic.   Eyes:      General: No scleral icterus.     Conjunctiva/sclera: Conjunctivae normal.   Cardiovascular:      Rate and Rhythm: Normal rate and regular rhythm.   Pulmonary:      Effort: Pulmonary effort is normal.      Breath sounds: Normal breath sounds.   Abdominal:      General: Abdomen is flat.      Palpations: Abdomen is soft.   Neurological:      Mental Status: She is alert.   Psychiatric:         Mood and Affect: Mood normal.          Significant Labs:   CBC:   Recent Labs   Lab 12/16/23 2039 12/17/23 0457 12/18/23  0503   WBC  --  19.72* 17.48*   HGB 8.7* 8.3* 8.6*   HCT 25.8* 25.2* 25.9*   PLT  --  1,060* 1,046*      and CMP:   Recent Labs   Lab 12/17/23 0457 12/18/23  0503    141   K 4.4 4.5    108   CO2 24 24   * 150*   BUN 19 22   CREATININE 0.6 0.6   CALCIUM 8.8 8.9   PROT 6.8 6.8   ALBUMIN 3.0* 2.9*   BILITOT 0.7 0.5   ALKPHOS 166* 161*   AST 24 24   ALT 31 33   ANIONGAP 7* 9         Diagnostic Results:  None  Review of Systems  Assessment/Plan:     Thrombocytosis  12/16/2023:  In addition to this elevated platelet count she also has an elevated WBC and rather severe anemia.  A detailed review of her available labs show that most of this seemed to begin over the summer of 2023 and has been slowly worsening.  Certainly her elevated platelet count seems to spike when she has an acute event such as an infection but this is a higher level than would normally be expected.  Given the finding of leucocytosis and anemia, I do have some concern that she may have a primary marrow myeloproliferative process.  KERLINE 2 is pending which evaluates for essential thrombocytosis, CML and  myeloproliferative disease in general but I will also add flow cytometry which would potentially detect other marrow issues such has other chronic or acute leukemias.      Also noted is a profound anemia of chronic diseases which certainly fits in her given situation.  No evidence of bleeding currently but need to continue to monitor for this.      12/17/2023:  Patient doing ok today.  Labs pending including Jak2 and flow cyto.  Will continue to follow.  Continue daily CBC for now.     12/18/2023:  Counts remain stable at this time.  Flow and Nestor-2 still pending.  No new issues this morning.  Continue current care approach.         Thank you for your consult. I will follow-up with patient. Please contact us if you have any additional questions.     Prashanth Maynard MD  Hematology/Oncology  [facility]

## 2023-12-18 NOTE — SUBJECTIVE & OBJECTIVE
Interval History:  Thrombocytosis workup still pending.  WBC downtrending.  Platelets 1046.    Review of Systems   Unable to perform ROS: Patient nonverbal     Objective:     Vital Signs (Most Recent):  Temp: 99 °F (37.2 °C) (12/18/23 1100)  Pulse: 75 (12/18/23 1100)  Resp: 18 (12/18/23 1100)  BP: (!) 154/73 (12/18/23 1100)  SpO2: 97 % (12/18/23 1100) Vital Signs (24h Range):  Temp:  [97.4 °F (36.3 °C)-99.1 °F (37.3 °C)] 99 °F (37.2 °C)  Pulse:  [75-95] 75  Resp:  [15-20] 18  SpO2:  [95 %-100 %] 97 %  BP: (139-161)/(66-85) 154/73     Weight: 61.8 kg (136 lb 4.3 oz)  Body mass index is 27.52 kg/m².    Intake/Output Summary (Last 24 hours) at 12/18/2023 1232  Last data filed at 12/18/2023 0957  Gross per 24 hour   Intake 1106 ml   Output 1450 ml   Net -344 ml         Physical Exam  Vitals reviewed.   Constitutional:       Appearance: She is ill-appearing.   HENT:      Head: Normocephalic and atraumatic.   Cardiovascular:      Rate and Rhythm: Normal rate.   Pulmonary:      Effort: No respiratory distress.      Breath sounds: No wheezing.   Abdominal:      General: There is no distension.      Tenderness: There is no abdominal tenderness.   Neurological:      Mental Status: She is alert. Mental status is at baseline.      Comments: Able to speak few words, minimally verbal,             Significant Labs: All pertinent labs within the past 24 hours have been reviewed.  CBC:   Recent Labs   Lab 12/16/23 2039 12/17/23  0457 12/18/23  0503   WBC  --  19.72* 17.48*   HGB 8.7* 8.3* 8.6*   HCT 25.8* 25.2* 25.9*   PLT  --  1,060* 1,046*     CMP:   Recent Labs   Lab 12/17/23 0457 12/18/23  0503    141   K 4.4 4.5    108   CO2 24 24   * 150*   BUN 19 22   CREATININE 0.6 0.6   CALCIUM 8.8 8.9   PROT 6.8 6.8   ALBUMIN 3.0* 2.9*   BILITOT 0.7 0.5   ALKPHOS 166* 161*   AST 24 24   ALT 31 33   ANIONGAP 7* 9       Significant Imaging: I have reviewed all pertinent imaging results/findings within the past 24  hours.

## 2023-12-19 VITALS
HEIGHT: 59 IN | TEMPERATURE: 100 F | WEIGHT: 136.25 LBS | RESPIRATION RATE: 18 BRPM | BODY MASS INDEX: 27.47 KG/M2 | HEART RATE: 94 BPM | OXYGEN SATURATION: 98 % | DIASTOLIC BLOOD PRESSURE: 83 MMHG | SYSTOLIC BLOOD PRESSURE: 151 MMHG

## 2023-12-19 PROBLEM — E87.5 HYPERKALEMIA: Status: RESOLVED | Noted: 2023-12-05 | Resolved: 2023-12-19

## 2023-12-19 PROBLEM — N17.9 AKI (ACUTE KIDNEY INJURY): Status: RESOLVED | Noted: 2021-07-02 | Resolved: 2023-12-19

## 2023-12-19 PROBLEM — J96.01 ACUTE RESPIRATORY FAILURE WITH HYPOXIA: Status: RESOLVED | Noted: 2023-12-07 | Resolved: 2023-12-19

## 2023-12-19 PROBLEM — N39.0 UTI (URINARY TRACT INFECTION): Status: RESOLVED | Noted: 2023-12-05 | Resolved: 2023-12-19

## 2023-12-19 PROBLEM — R65.20 SEVERE SEPSIS: Status: RESOLVED | Noted: 2023-10-25 | Resolved: 2023-12-19

## 2023-12-19 PROBLEM — N13.30 HYDRONEPHROSIS: Status: RESOLVED | Noted: 2023-12-07 | Resolved: 2023-12-19

## 2023-12-19 PROBLEM — A41.9 SEVERE SEPSIS: Status: RESOLVED | Noted: 2023-10-25 | Resolved: 2023-12-19

## 2023-12-19 LAB
ALBUMIN SERPL BCP-MCNC: 2.9 G/DL (ref 3.5–5.2)
ALP SERPL-CCNC: 154 U/L (ref 55–135)
ALT SERPL W/O P-5'-P-CCNC: 38 U/L (ref 10–44)
ANION GAP SERPL CALC-SCNC: 8 MMOL/L (ref 8–16)
AST SERPL-CCNC: 28 U/L (ref 10–40)
BASOPHILS # BLD AUTO: 0.1 K/UL (ref 0–0.2)
BASOPHILS NFR BLD: 0.7 % (ref 0–1.9)
BILIRUB SERPL-MCNC: 0.5 MG/DL (ref 0.1–1)
BUN SERPL-MCNC: 21 MG/DL (ref 8–23)
CALCIUM SERPL-MCNC: 8.8 MG/DL (ref 8.7–10.5)
CHLORIDE SERPL-SCNC: 105 MMOL/L (ref 95–110)
CO2 SERPL-SCNC: 24 MMOL/L (ref 23–29)
CREAT SERPL-MCNC: 0.6 MG/DL (ref 0.5–1.4)
DIFFERENTIAL METHOD BLD: ABNORMAL
EOSINOPHIL # BLD AUTO: 0.4 K/UL (ref 0–0.5)
EOSINOPHIL NFR BLD: 2.7 % (ref 0–8)
ERYTHROCYTE [DISTWIDTH] IN BLOOD BY AUTOMATED COUNT: 22.1 % (ref 11.5–14.5)
EST. GFR  (NO RACE VARIABLE): >60 ML/MIN/1.73 M^2
GLUCOSE SERPL-MCNC: 150 MG/DL (ref 70–110)
GLUCOSE SERPL-MCNC: 161 MG/DL (ref 70–110)
GLUCOSE SERPL-MCNC: 188 MG/DL (ref 70–110)
HCT VFR BLD AUTO: 27.4 % (ref 37–48.5)
HGB BLD-MCNC: 9 G/DL (ref 12–16)
IMM GRANULOCYTES # BLD AUTO: 0.25 K/UL (ref 0–0.04)
IMM GRANULOCYTES NFR BLD AUTO: 1.6 % (ref 0–0.5)
LYMPHOCYTES # BLD AUTO: 2 K/UL (ref 1–4.8)
LYMPHOCYTES NFR BLD: 13 % (ref 18–48)
MAGNESIUM SERPL-MCNC: 2.1 MG/DL (ref 1.6–2.6)
MCH RBC QN AUTO: 25.9 PG (ref 27–31)
MCHC RBC AUTO-ENTMCNC: 32.8 G/DL (ref 32–36)
MCV RBC AUTO: 79 FL (ref 82–98)
MONOCYTES # BLD AUTO: 1.3 K/UL (ref 0.3–1)
MONOCYTES NFR BLD: 8.6 % (ref 4–15)
NEUTROPHILS # BLD AUTO: 11.2 K/UL (ref 1.8–7.7)
NEUTROPHILS NFR BLD: 73.4 % (ref 38–73)
NRBC BLD-RTO: 0 /100 WBC
PLATELET # BLD AUTO: 1057 K/UL (ref 150–450)
PMV BLD AUTO: 8.8 FL (ref 9.2–12.9)
POTASSIUM SERPL-SCNC: 4.3 MMOL/L (ref 3.5–5.1)
PROT SERPL-MCNC: 6.7 G/DL (ref 6–8.4)
RBC # BLD AUTO: 3.48 M/UL (ref 4–5.4)
SODIUM SERPL-SCNC: 137 MMOL/L (ref 136–145)
WBC # BLD AUTO: 15.27 K/UL (ref 3.9–12.7)

## 2023-12-19 PROCEDURE — 94799 UNLISTED PULMONARY SVC/PX: CPT

## 2023-12-19 PROCEDURE — 25000242 PHARM REV CODE 250 ALT 637 W/ HCPCS: Performed by: STUDENT IN AN ORGANIZED HEALTH CARE EDUCATION/TRAINING PROGRAM

## 2023-12-19 PROCEDURE — 85025 COMPLETE CBC W/AUTO DIFF WBC: CPT | Performed by: STUDENT IN AN ORGANIZED HEALTH CARE EDUCATION/TRAINING PROGRAM

## 2023-12-19 PROCEDURE — 25000003 PHARM REV CODE 250: Performed by: STUDENT IN AN ORGANIZED HEALTH CARE EDUCATION/TRAINING PROGRAM

## 2023-12-19 PROCEDURE — 83735 ASSAY OF MAGNESIUM: CPT | Performed by: STUDENT IN AN ORGANIZED HEALTH CARE EDUCATION/TRAINING PROGRAM

## 2023-12-19 PROCEDURE — 36415 COLL VENOUS BLD VENIPUNCTURE: CPT | Performed by: STUDENT IN AN ORGANIZED HEALTH CARE EDUCATION/TRAINING PROGRAM

## 2023-12-19 PROCEDURE — 99900035 HC TECH TIME PER 15 MIN (STAT)

## 2023-12-19 PROCEDURE — 94761 N-INVAS EAR/PLS OXIMETRY MLT: CPT

## 2023-12-19 PROCEDURE — 25000003 PHARM REV CODE 250: Performed by: NURSE PRACTITIONER

## 2023-12-19 PROCEDURE — 94640 AIRWAY INHALATION TREATMENT: CPT

## 2023-12-19 PROCEDURE — 80053 COMPREHEN METABOLIC PANEL: CPT | Performed by: NURSE PRACTITIONER

## 2023-12-19 PROCEDURE — 63600175 PHARM REV CODE 636 W HCPCS: Performed by: NURSE PRACTITIONER

## 2023-12-19 PROCEDURE — 25000003 PHARM REV CODE 250: Performed by: INTERNAL MEDICINE

## 2023-12-19 PROCEDURE — 99900031 HC PATIENT EDUCATION (STAT)

## 2023-12-19 RX ADMIN — APIXABAN 2.5 MG: 2.5 TABLET, FILM COATED ORAL at 10:12

## 2023-12-19 RX ADMIN — IPRATROPIUM BROMIDE 0.5 MG: 0.5 SOLUTION RESPIRATORY (INHALATION) at 03:12

## 2023-12-19 RX ADMIN — IPRATROPIUM BROMIDE 0.5 MG: 0.5 SOLUTION RESPIRATORY (INHALATION) at 07:12

## 2023-12-19 RX ADMIN — ASPIRIN 81 MG 81 MG: 81 TABLET ORAL at 10:12

## 2023-12-19 RX ADMIN — POLYETHYLENE GLYCOL 3350 17 G: 17 POWDER, FOR SOLUTION ORAL at 10:12

## 2023-12-19 RX ADMIN — INSULIN DETEMIR 35 UNITS: 100 INJECTION, SOLUTION SUBCUTANEOUS at 10:12

## 2023-12-19 RX ADMIN — AMLODIPINE BESYLATE 10 MG: 5 TABLET ORAL at 10:12

## 2023-12-19 RX ADMIN — MINERAL SUPPLEMENT IRON 300 MG / 5 ML STRENGTH LIQUID 100 PER BOX UNFLAVORED 300 MG: at 10:12

## 2023-12-19 RX ADMIN — MEROPENEM 1 G: 1 INJECTION, POWDER, FOR SOLUTION INTRAVENOUS at 10:12

## 2023-12-19 RX ADMIN — LOSARTAN POTASSIUM 100 MG: 50 TABLET, FILM COATED ORAL at 10:12

## 2023-12-19 RX ADMIN — MEROPENEM 1 G: 1 INJECTION, POWDER, FOR SOLUTION INTRAVENOUS at 12:12

## 2023-12-19 RX ADMIN — METOPROLOL TARTRATE 25 MG: 25 TABLET, FILM COATED ORAL at 10:12

## 2023-12-19 RX ADMIN — INSULIN ASPART 2 UNITS: 100 INJECTION, SOLUTION INTRAVENOUS; SUBCUTANEOUS at 10:12

## 2023-12-19 RX ADMIN — LEVETIRACETAM 500 MG: 100 SOLUTION ORAL at 10:12

## 2023-12-19 RX ADMIN — FAMOTIDINE 20 MG: 20 TABLET ORAL at 10:12

## 2023-12-19 NOTE — PLAN OF CARE
Discharge orders uploaded into [facility] referral to [facility], dominik Bhatia notified.        12/19/23 1132   Post-Acute Status   Post-Acute Authorization Placement   Post-Acute Placement Status Set-up Complete/Auth obtained

## 2023-12-19 NOTE — NURSING
Report called to Glo at [facility]. Ambulance arrived early to transport patient. [facility] agreeable to take report and ok given to go ahead and transfer patient. Patient discharged with ambulance staff safely in stable condition without any complications. VSS. NAD noted. AAOX4. All patients personal belongings and discharge instructions sent with patient. Family at bedside at time of transfer and aware of patient being discharged from [facility].

## 2023-12-19 NOTE — PLAN OF CARE
MARYCARMEN discussed with Jannette with [facility] and nurse can call report at 1600, Jannette to give nurse number to call and set up ambulance transport.  MARYCARMEN spoke with pt daughter and she is fine with discharge today. Reports that she has spoke with [facility].  Discharge orders and chart reviewed with no further post-acute discharge needs identified at this time.  At this time, patient is cleared for discharge from Case Management standpoint.        12/19/23 1303   Final Note   Assessment Type Final Discharge Note   Anticipated Discharge Disposition Long Term   Post-Acute Status   Post-Acute Authorization Placement   Post-Acute Placement Status Set-up Complete/Auth obtained   Discharge Delays (!) Ambulance Transport/Facility Transport

## 2023-12-19 NOTE — DISCHARGE SUMMARY
[facility] Medicine  Discharge Summary      Patient Name: Steff Johnson  MRN: [id number]  JOYA: [id number]  Patient Class: IP- Inpatient  Admission Date: 12/5/2023  Hospital Length of Stay: 14 days  Discharge Date and Time:  12/19/2023 4:58 PM  Attending Physician: No att. providers found   Discharging Provider: Amber Fernández MD  Primary Care Provider: Cristina Ren MD    Primary Care Team: Networked reference to record PCT     HPI:   Patient is a 63-year-old female with history of severe CVA with aphasia, upper and lower extremity weakness, bed-bound, cognitive impairment, hypertension, hyperlipidemia, type 2 diabetes, COPD presents to the emergency room for concerns of hematuria and hypoxia.  Patient nonverbal at baseline, history provided by ED physician.  Tried to contact daughter, was unsuccessful.  Per ED, patient presented from [facility], staff noticed bloody urine output from her chronic indwelling Mathur catheter as well as hypoxia.  Non-rebreather was placed on patient and this improved her oxygenation saturation.  Brought to ED by EMS.  Per ED physician patient did have episode of emesis, likely aspirating.    On admission temp 100.4, heart rate 111, O2 89%, WBC 40.96, hemoglobin 9.6, potassium 5.2, creatinine 1.7, lactic 3.8, procalcitonin 133.671.  Review of CXR did not show any obvious consolidations.  UA consistent with UTI.    Procedure(s) (LRB):  CYSTOSCOPY, WITH RETROGRADE PYELOGRAM (N/A)  INSERTION, CATHETER (N/A)      Hospital Course:   Patient admitted for hematuria and hypoxia.  Was started on empiric antibiotics.  Blood cultures grew Proteus ESBL, Enterococcus, Candida glabrata.  Infectious Disease consulted.  Wound care consulted for chronic sacral wound.  Patient started on vancomycin, meropenem, micafungin.  Given positive blood cultures justyna was done which was negative for endocarditis/vegetation.  CT scan showed fluid collection in the gallbladder fossa,  interventional Radiology consulted recommended no intervention at this time since this is stable compared to prior imaging.  Urology was consulted given concern of hydronephrosis on CT scan although repeat retroperitoneal ultrasound showed no hydro nephrosis.  Cystoscopy done on 12/14/2023 showed no hydronephrosis, no intervention was done.  Repeat blood cultures on 12/10 have been negative so far.  Platelets noted to be elevated in the thousands, hematology consulted.  Patient had similar presentation during last episode of sepsis.  Likely secondary to infection, work up pending. Infectious Disease recommended IV antibiotics for 2 weeks.  Plan for discharge to LTAC once accepted.  Discussed with Hematology, okay to discharge follow up outpatient for workup of thrombocytosis, continue anticoagulation.  Patient medically stable for discharge to LTAC.    Physical Exam    HENT:      Head: Normocephalic and atraumatic.   Cardiovascular:      Rate and Rhythm: Normal rate.   Pulmonary:      Effort: No respiratory distress.      Breath sounds: No wheezing.   Abdominal:      General: There is no distension.      Tenderness: There is no abdominal tenderness.     Goals of Care Treatment Preferences:  Code Status: Full Code          What is most important right now is to focus on avoiding the hospital, remaining as independent as possible, symptom/pain control, improvement in condition but with limits to invasive therapies.  Accordingly, we have decided that the best plan to meet the patient's goals includes continuing with treatment.      Consults:   Consults (From admission, onward)          Status Ordering Provider     Inpatient consult to Registered Dietitian/Nutritionist  Once        Provider:  (Not yet assigned)    Completed EDI YUEN     Inpatient consult to Social Work/Case Management  Once        Provider:  (Not yet assigned)    Acknowledged MITCH HANNA     Inpatient consult to Hematology Oncology  Once         Provider:  Prashanth Virk MD    Acknowledged EDI YUEN     Inpatient consult to Gastroenterology  Once        Provider:  MICHELE Claudio MD    Acknowledged KOLBY STEINER     Inpatient consult to Urology  Once        Provider:  Sergio Soria MD    Completed KOLBY STEINER     Inpatient consult to Cardiology  Once        Provider:  Blade Phillip MD    Completed KOLBY STEINER     Inpatient consult to Midline team  Once        Provider:  (Not yet assigned)    Completed GJINOMARTITAA     Inpatient consult to Urology  Once        Provider:  Sergio Soria MD    Completed CECILIA BOLAND     Inpatient consult to Registered Dietitian/Nutritionist  Once        Provider:  (Not yet assigned)    Completed REBECA LAMAS     Inpatient consult to Registered Dietitian/Nutritionist  Once        Provider:  (Not yet assigned)    Completed REBECA LAMAS     Inpatient consult to Infectious Diseases  Once        Provider:  Cecilia Boland MD    Completed REBECA LAMAS     Inpatient consult to Registered Dietitian/Nutritionist  Once        Provider:  (Not yet assigned)    Completed EDI YUEN            No new Assessment & Plan notes have been filed under this hospital service since the last note was generated.  Service: Hospital Medicine    Final Active Diagnoses:    Diagnosis Date Noted POA    Bacteremia due to Proteus species [R78.81, B96.4] 12/07/2023 Yes    Bedridden [Z74.01] 12/07/2023 Not Applicable    Blisters of multiple sites [R23.8] 12/07/2023 Yes    Chronic indwelling Mathur catheter [Z97.8] 12/07/2023 Not Applicable    Leucocytosis [D72.829] 12/06/2023 Yes    Pressure injury of sacral region, stage 4 [L89.154] 10/25/2023 Yes    Thrombocytosis [D75.839] 10/25/2023 Yes    Anemia [D64.9] 10/25/2023 Yes    Aphasia [R47.01] 05/31/2023 Yes    Cognitive communication deficit [R41.841] 05/31/2023 Yes    Combined forms of age-related cataract, bilateral  [H25.813] 02/09/2022 Yes    CVA (cerebral vascular accident) [I63.9] 12/12/2019 Yes    Mixed hyperlipidemia [E78.2] 07/19/2018 Yes    Hyperglycemia due to type 2 diabetes mellitus [E11.65] 06/05/2015 Yes    Hypertension associated with diabetes [E11.59, I15.2] 03/31/2012 Yes      Problems Resolved During this Admission:    Diagnosis Date Noted Date Resolved POA    PRINCIPAL PROBLEM:  Severe sepsis [A41.9, R65.20] 10/25/2023 12/19/2023 Yes    ESBL (extended spectrum beta-lactamase) producing bacteria infection [A49.9, Z16.12] 12/08/2023 12/14/2023 Yes    Acute respiratory failure with hypoxia [J96.01] 12/07/2023 12/19/2023 Yes    Cognitive impairment [R41.89] 12/07/2023 12/14/2023 Yes    Hydronephrosis [N13.30] 12/07/2023 12/19/2023 Yes    Hypoxia [R09.02] 12/07/2023 12/14/2023 Yes    Pressure injury of sacral region, stage 3 [L89.153] 12/07/2023 12/14/2023 Yes    Aspiration pneumonia of right lung due to vomit [J69.0] 12/07/2023 12/14/2023 Yes    UTI (urinary tract infection) [N39.0] 12/05/2023 12/19/2023 Yes    Hyperkalemia [E87.5] 12/05/2023 12/19/2023 Yes    COURTNEY (acute kidney injury) [N17.9] 07/02/2021 12/19/2023 Yes       Discharged Condition: good    Disposition: Another Health Care Inst*    Follow Up:   Follow-up Information       Rae Ramirez MD Follow up in 3 week(s).    Specialty: Infectious Diseases  Why: hospital follow up  Contact information:  [address]  [address]  [place] [address]  [phone]               Teo Sanders DO Follow up in 3 week(s).    Specialty: Wound Care  Contact information:  [address]  [address]  [place] [address]  [phone]               Prashanth Virk MD Follow up in 2 week(s).    Specialties: Hematology, Oncology, Hematology and Oncology  Contact information:  [facility] [address] [address] [phone]                           Patient Instructions:      CT Chest Without Contrast   Standing Status: Future Standing Exp. Date:  12/16/24     Order Specific Question Answer Comments   May the Radiologist modify the order per protocol to meet the clinical needs of the patient? Yes      Notify your health care provider if you experience any of the following:  temperature >100.4     Notify your health care provider if you experience any of the following:  persistent nausea and vomiting or diarrhea     Notify your health care provider if you experience any of the following:  severe uncontrolled pain     Notify your health care provider if you experience any of the following:  redness, tenderness, or signs of infection (pain, swelling, redness, odor or green/yellow discharge around incision site)     Tube Feedings/Formulas   Order Comments: [facility] 40     Order Specific Question Answer Comments   Select Adult Formula: Other    Route: Gastrostomy    Formula Rate (mL/hr): 40      Activity as tolerated       Significant Diagnostic Studies: N/A    Pending Diagnostic Studies:       Procedure Component Value Units Date/Time    Flow Cytometry Analysis (Peripheral Blood) [[id number]]     Order Status: Sent Lab Status: No result     Specimen: Blood     JAK2 Exon 12 And Other Non-V617 Mutation Detection, Bld [[id number]] Collected: 12/15/23 1216    Order Status: Sent Lab Status: In process Updated: 12/15/23 1241    Specimen: Blood            Medications:  Reconciled Home Medications:      Medication List        START taking these medications      Lactobacillus acidoph-L.bulgar 1 million cell Chew  Take 4 tablets by mouth 3 (three) times daily with meals. for 14 days     MEROPENEM 1 G/100 ML NS (READY TO MIX)  Inject 100 mLs (1 g total) into the vein every 8 (eight) hours. for 9 days     MICAFUNGIN 100 MG/100 ML NS (READY TO MIX)  Inject 100 mLs (100 mg total) into the vein once daily. for 9 days     sodium chloride 0.9% SolP 50 mL with DAPTOmycin 500 mg SolR 500 mg  Inject 500 mg into the vein once daily. for 9 days            CHANGE how you take these  medications      aspirin 81 MG Chew  Take 1 tablet (81 mg total) by mouth once daily.  What changed: how to take this     atorvastatin 80 MG tablet  Commonly known as: LIPITOR  Take 1 tablet (80 mg total) by mouth every evening.  What changed: how to take this     latanoprost 0.005 % ophthalmic solution  Place 1 drop into both eyes once daily.  What changed: when to take this            CONTINUE taking these medications      amLODIPine 10 MG tablet  Commonly known as: NORVASC  Take 1 tablet (10 mg total) by mouth once daily.     baclofen 5 mg Tab tablet  Commonly known as: LIORESAL  5 mg by Per G Tube route every 8 (eight) hours as needed (muscle spasms).     cloNIDine 0.1 MG tablet  Commonly known as: CATAPRES  Take 1 tablet (0.1 mg total) by mouth 3 (three) times daily.     ELIQUIS 2.5 mg Tab  Generic drug: apixaban  2.5 mg by Per G Tube route 2 (two) times daily.     folic acid 1 MG tablet  Commonly known as: FOLVITE  1 tablet by Per G Tube route every morning.     furosemide 40 MG tablet  Commonly known as: LASIX  40 mg by Per G Tube route once daily.     insulin aspart U-100 100 unit/mL injection  Commonly known as: NovoLOG  Inject 4 Units into the skin As instructed for High Blood Sugar (Four times daily per sliding scale). 0 -150 = 0 units  151 - 200 = 4 units  201 - 250 = 8 units  251 - 300 = 10 units  301 - 350 = 12 units  351 - 400 = 16 units  401 - 1000 = 20 units call MD ANTOLIN GILES U-100 INSULIN glargine 100 units/mL SubQ pen  Generic drug: insulin  Inject 35 Units into the skin once daily.     levETIRAcetam 100 mg/mL Soln  Commonly known as: KEPPRA  500 mg by Per G Tube route 2 (two) times daily.     losartan 100 MG tablet  Commonly known as: COZAAR  Take 1 tablet (100 mg total) by mouth once daily.     magnesium oxide 400 mg (241.3 mg magnesium) tablet  Commonly known as: MAG-OX  400 mg by Per G Tube route once daily.     metFORMIN 500 MG tablet  Commonly known as: GLUCOPHAGE  1,000 mg by Per  G Tube route 2 (two) times daily with meals.     metoprolol tartrate 25 MG tablet  Commonly known as: LOPRESSOR  25 mg by Per G Tube route 2 (two) times daily.     multivitamin per tablet  Commonly known as: THERAGRAN  1 tablet by Per G Tube route once daily.     polyethylene glycol 17 gram Pwpk  Commonly known as: GLYCOLAX  17 g by Per G Tube route once daily.     SPIRIVA WITH HANDIHALER 18 mcg inhalation capsule  Generic drug: tiotropium  Inhale 1 capsule (18 mcg total) into the lungs once daily. Controller     VITAMIN B-1 100 MG tablet  Generic drug: thiamine  100 mg by Per G Tube route once daily.            STOP taking these medications      metoprolol succinate 50 MG 24 hr tablet  Commonly known as: TOPROL-XL              Indwelling Lines/Drains at time of discharge:   Lines/Drains/Airways       Drain  Duration                  Gastrostomy/Enterostomy 10/25/23 1900 midline 54 days         Urethral Catheter 12/06/23 0248 Silicone 16 Fr. 13 days                    Time spent on the discharge of patient: 37 minutes         Amber Fernández MD  Department of Hospital Medicine  [facility]

## 2023-12-19 NOTE — CARE UPDATE
12/18/23 2036   Patient Assessment/Suction   Level of Consciousness (AVPU) alert   Respiratory Effort Unlabored   Expansion/Accessory Muscles/Retractions no use of accessory muscles   All Lung Fields Breath Sounds clear;diminished   Rhythm/Pattern, Respiratory no shortness of breath reported   Cough Frequency infrequent   Cough Type no productive sputum   PRE-TX-O2   Device (Oxygen Therapy) room air   SpO2 99 %   Pulse Oximetry Type Intermittent   $ Pulse Oximetry - Multiple Charge Pulse Oximetry - Multiple   Pulse 90   Resp 15   Positioning   Head of Bed (HOB) Positioning HOB elevated;HOB at 30 degrees   Aerosol Therapy   $ Aerosol Therapy Charges Aerosol Treatment   Daily Review of Necessity (SVN) completed   Respiratory Treatment Status (SVN) given   Treatment Route (SVN) mask   Patient Position (SVN) semi-Berman's   Post Treatment Assessment (SVN) breath sounds improved   Signs of Intolerance (SVN) none   Breath Sounds Post-Respiratory Treatment   Throughout All Fields Post-Treatment All Fields   Throughout All Fields Post-Treatment aeration increased   Post-treatment Heart Rate (beats/min) 95   Post-treatment Resp Rate (breaths/min) 20   Education   $ Education Bronchodilator;15 min   Respiratory Evaluation   $ Care Plan Tech Time 15 min   $ Eval/Re-eval Charges Re-evaluation

## 2023-12-27 LAB
CITATION REF LAB TEST: NORMAL
JAK2 GENE MUT ANL BLD/T: NORMAL
LAB DIRECTOR NAME PROVIDER: NORMAL
LABORATORY COMMENT REPORT: NORMAL
REF LAB TEST METHOD: NORMAL

## 2024-01-04 ENCOUNTER — PATIENT MESSAGE (OUTPATIENT)
Dept: ADMINISTRATIVE | Facility: CLINIC | Age: 64
End: 2024-01-04
Payer: MEDICARE

## 2024-01-09 ENCOUNTER — HOSPITAL ENCOUNTER (EMERGENCY)
Facility: HOSPITAL | Age: 64
Discharge: HOME | End: 2024-01-09
Attending: EMERGENCY MEDICINE
Payer: MEDICARE

## 2024-01-09 VITALS
HEIGHT: 59 IN | DIASTOLIC BLOOD PRESSURE: 83 MMHG | SYSTOLIC BLOOD PRESSURE: 183 MMHG | BODY MASS INDEX: 27.42 KG/M2 | OXYGEN SATURATION: 96 % | TEMPERATURE: 98 F | RESPIRATION RATE: 20 BRPM | WEIGHT: 136 LBS | HEART RATE: 96 BPM

## 2024-01-09 DIAGNOSIS — K94.23 PEG TUBE MALFUNCTION: Primary | ICD-10-CM

## 2024-01-09 PROCEDURE — 25500020 PHARM REV CODE 255

## 2024-01-09 PROCEDURE — 99283 EMERGENCY DEPT VISIT LOW MDM: CPT

## 2024-01-09 RX ADMIN — DIATRIZOATE MEGLUMINE AND DIATRIZOATE SODIUM 30 ML: 660; 100 LIQUID ORAL; RECTAL at 06:01

## 2024-01-09 NOTE — ED NOTES
Peg tube replaced with 24 fr with 20 cc balloon. Drainage sponge in place. Awaiting xray to confirm placement is safe to use.

## 2024-01-09 NOTE — ED PROVIDER NOTES
Chief complaint:  Split Peg tube      HPI:  Steff Johnson is a 64 y.o. female with hx htn, IDDM, COPD, CVAs with persistent vegetative state and chronic PEG presenting with separation of distal end of PEG tube.  Nursing staff noted this morning continuous to feet contents leaking onto the floor.  Uncertain time of PEG tube damage or etiology.  No other clinical change in patient.    ROS: As per HPI and below:  Unavailable secondary to patient baseline mental status    Review of patient's allergies indicates:  No Known Allergies    Patient's Medications   New Prescriptions    No medications on file   Previous Medications    AMLODIPINE (NORVASC) 10 MG TABLET    Take 1 tablet (10 mg total) by mouth once daily.    APIXABAN (ELIQUIS) 2.5 MG TAB    2.5 mg by Per G Tube route 2 (two) times daily.    ASPIRIN 81 MG CHEW    Take 1 tablet (81 mg total) by mouth once daily.    ATORVASTATIN (LIPITOR) 80 MG TABLET    Take 1 tablet (80 mg total) by mouth every evening.    BACLOFEN (LIORESAL) 5 MG TAB TABLET    5 mg by Per G Tube route every 8 (eight) hours as needed (muscle spasms).    CLONIDINE (CATAPRES) 0.1 MG TABLET    Take 1 tablet (0.1 mg total) by mouth 3 (three) times daily.    FOLIC ACID (FOLVITE) 1 MG TABLET    1 tablet by Per G Tube route every morning.    FUROSEMIDE (LASIX) 40 MG TABLET    40 mg by Per G Tube route once daily.    INSULIN (LANTUS SOLOSTAR U-100 INSULIN) GLARGINE 100 UNITS/ML SUBQ PEN    Inject 35 Units into the skin once daily.    INSULIN ASPART U-100 (NOVOLOG) 100 UNIT/ML INJECTION    Inject 4 Units into the skin As instructed for High Blood Sugar (Four times daily per sliding scale). 0 -150 = 0 units  151 - 200 = 4 units  201 - 250 = 8 units  251 - 300 = 10 units  301 - 350 = 12 units  351 - 400 = 16 units  401 - 1000 = 20 units call MD    LATANOPROST 0.005 % OPHTHALMIC SOLUTION    Place 1 drop into both eyes once daily.    LEVETIRACETAM (KEPPRA) 100 MG/ML SOLN    500 mg by Per G Tube route 2 (two)  times daily.    LOSARTAN (COZAAR) 100 MG TABLET    Take 1 tablet (100 mg total) by mouth once daily.    MAGNESIUM OXIDE (MAG-OX) 400 MG (241.3 MG MAGNESIUM) TABLET    400 mg by Per G Tube route once daily.    METFORMIN (GLUCOPHAGE) 500 MG TABLET    1,000 mg by Per G Tube route 2 (two) times daily with meals.    METOPROLOL TARTRATE (LOPRESSOR) 25 MG TABLET    25 mg by Per G Tube route 2 (two) times daily.    MULTIVITAMIN (THERAGRAN) PER TABLET    1 tablet by Per G Tube route once daily.    POLYETHYLENE GLYCOL (GLYCOLAX) 17 GRAM PWPK    17 g by Per G Tube route once daily.    THIAMINE (VITAMIN B-1) 100 MG TABLET    100 mg by Per G Tube route once daily.    TIOTROPIUM (SPIRIVA) 18 MCG INHALATION CAPSULE    Inhale 1 capsule (18 mcg total) into the lungs once daily. Controller   Modified Medications    No medications on file   Discontinued Medications    No medications on file       PMH:  As per HPI and below:  Past Medical History:   Diagnosis Date    Arthritis     Complete tear of left rotator cuff 11/09/2017    COPD (chronic obstructive pulmonary disease)     COVID-19 infection 07/02/2021    Diabetes mellitus     H/o Cerebrovascular accident (CVA) of left pontine structure 12/12/2019    Hypertension     Personal history of colonic polyps     Stroke     Wears glasses      Past Surgical History:   Procedure Laterality Date    COLONOSCOPY N/A 4/11/2017    Procedure: COLONOSCOPY;  Surgeon: Jared Antonio MD;  Location: [facility] ENDO;  Service: Endoscopy;  Laterality: N/A;    CYSTOSCOPY W/ RETROGRADES N/A 12/14/2023    Procedure: CYSTOSCOPY, WITH RETROGRADE PYELOGRAM;  Surgeon: Sergio Soria MD;  Location: [facility] OR;  Service: Urology;  Laterality: N/A;    ECHOCARDIOGRAM,TRANSESOPHAGEAL N/A 12/12/2023    Procedure: Transesophageal echo (GT) intra-procedure log documentation;  Surgeon: Antoine Rivera MD;  Location: [facility] CATH/EP LAB;  Service: Cardiology;  Laterality: N/A;    HYSTERECTOMY      INSERTION OF CATHETER  N/A 12/14/2023    Procedure: INSERTION, CATHETER;  Surgeon: Sergio Soria MD;  Location: [facility] OR;  Service: Urology;  Laterality: N/A;    OOPHORECTOMY      SHOULDER SURGERY      R    TRANSESOPHAGEAL ECHOCARDIOGRAM WITH POSSIBLE CARDIOVERSION (GT W/ POSS CARDIOVERSION) N/A 5/4/2023    Procedure: Transesophageal echo (GT) intra-procedure log documentation;  Surgeon: Blade Phillip MD;  Location: [facility] CATH/EP LAB;  Service: Cardiology;  Laterality: N/A;       Social History     Socioeconomic History    Marital status: Single   Occupational History     Comment: disabled due to shoulder problems   Tobacco Use    Smoking status: Never    Smokeless tobacco: Never   Substance and Sexual Activity    Alcohol use: No    Drug use: No    Sexual activity: Not Currently     Social Determinants of Health     Financial Resource Strain: Low Risk  (12/6/2023)    Overall Financial Resource Strain (CARDIA)     Difficulty of Paying Living Expenses: Not hard at all   Food Insecurity: No Food Insecurity (12/6/2023)    Hunger Vital Sign     Worried About Running Out of Food in the Last Year: Never true     Ran Out of Food in the Last Year: Never true   Transportation Needs: No Transportation Needs (12/6/2023)    PRAPARE - Transportation     Lack of Transportation (Medical): No     Lack of Transportation (Non-Medical): No   Physical Activity: Sufficiently Active (12/6/2023)    Exercise Vital Sign     Days of Exercise per Week: 5 days     Minutes of Exercise per Session: 150+ min   Stress: No Stress Concern Present (12/6/2023)    [employer] [place] [employer] - Occupational Stress Questionnaire     Feeling of Stress : Only a little   Social Connections: Moderately Isolated (12/6/2023)    Social Connection and Isolation Panel [NHANES]     Frequency of Communication with Friends and Family: Once a week     Frequency of Social Gatherings with Friends and Family: Once a week     Attends Buddhist Services: More than 4 times  per year     Active Member of Clubs or Organizations: No     Attends Club or Organization Meetings: Never     Marital Status: Living with partner   Housing Stability: Low Risk  (12/6/2023)    Housing Stability Vital Sign     Unable to Pay for Housing in the Last Year: No     Number of Places Lived in the Last Year: 2     Unstable Housing in the Last Year: No       Family History   Problem Relation Age of Onset    Diabetes Mother     Asthma Mother     Hypertension Mother     Diabetes Father     Diabetes Brother     Hypertension Brother     Anemia Daughter     Glaucoma Neg Hx     Macular degeneration Neg Hx     Retinal detachment Neg Hx        Physical Exam:    Vitals:    01/09/24 0633   BP: (!) 183/83   Pulse: 92   Resp:    Temp:      GENERAL:  No apparent distress.  Opens eyes spontaneously.    HEENT:  Moist mucous membranes.  Normocephalic and atraumatic.    NECK:  No swelling.  Midline trachea.   CARDIOVASCULAR:  Regular rate and rhythm.  2+ radial pulses.    PULMONARY:  Lungs clear to auscultation bilaterally.  No wheezes, rales, or rhonci.    ABDOMEN:  Non-tender and non-distended.  PEG tube with distal end abscess and partial split along length of tubing terminating prior to entry point to abdomen.    EXTREMITIES:  Warm and well perfused.  Brisk capillary refill.  Contractures to upper and lower extremities noted.  NEUROLOGICAL:  Nonverbal.  No purposeful movements.    SKIN:  No rashes or ecchymoses.      Labs Reviewed - No data to display    Current Discharge Medication List        CONTINUE these medications which have NOT CHANGED    Details   amLODIPine (NORVASC) 10 MG tablet Take 1 tablet (10 mg total) by mouth once daily.  Qty: 90 tablet, Refills: 3    Comments: .      apixaban (ELIQUIS) 2.5 mg Tab 2.5 mg by Per G Tube route 2 (two) times daily.      aspirin 81 MG Chew Take 1 tablet (81 mg total) by mouth once daily.  Qty: 30 tablet, Refills: 0    Associated Diagnoses: Cerebrovascular accident (CVA) of left  pontine structure      atorvastatin (LIPITOR) 80 MG tablet Take 1 tablet (80 mg total) by mouth every evening.  Qty: 30 tablet, Refills: 0    Associated Diagnoses: Mixed hyperlipidemia      baclofen (LIORESAL) 5 mg Tab tablet 5 mg by Per G Tube route every 8 (eight) hours as needed (muscle spasms).      cloNIDine (CATAPRES) 0.1 MG tablet Take 1 tablet (0.1 mg total) by mouth 3 (three) times daily.  Qty: 90 tablet, Refills: 11    Comments: .      folic acid (FOLVITE) 1 MG tablet 1 tablet by Per G Tube route every morning.      furosemide (LASIX) 40 MG tablet 40 mg by Per G Tube route once daily.      insulin (LANTUS SOLOSTAR U-100 INSULIN) glargine 100 units/mL SubQ pen Inject 35 Units into the skin once daily.      insulin aspart U-100 (NOVOLOG) 100 unit/mL injection Inject 4 Units into the skin As instructed for High Blood Sugar (Four times daily per sliding scale). 0 -150 = 0 units  151 - 200 = 4 units  201 - 250 = 8 units  251 - 300 = 10 units  301 - 350 = 12 units  351 - 400 = 16 units  401 - 1000 = 20 units call MD      latanoprost 0.005 % ophthalmic solution Place 1 drop into both eyes once daily.  Qty: 7.5 mL, Refills: 6    Associated Diagnoses: Ocular hypertension, bilateral      levETIRAcetam (KEPPRA) 100 mg/mL Soln 500 mg by Per G Tube route 2 (two) times daily.      losartan (COZAAR) 100 MG tablet Take 1 tablet (100 mg total) by mouth once daily.  Qty: 90 tablet, Refills: 3    Comments: .      magnesium oxide (MAG-OX) 400 mg (241.3 mg magnesium) tablet 400 mg by Per G Tube route once daily.      metFORMIN (GLUCOPHAGE) 500 MG tablet 1,000 mg by Per G Tube route 2 (two) times daily with meals.      metoprolol tartrate (LOPRESSOR) 25 MG tablet 25 mg by Per G Tube route 2 (two) times daily.      multivitamin (THERAGRAN) per tablet 1 tablet by Per G Tube route once daily.      polyethylene glycol (GLYCOLAX) 17 gram PwPk 17 g by Per G Tube route once daily.      thiamine (VITAMIN B-1) 100 MG tablet 100 mg by  Per G Tube route once daily.      tiotropium (SPIRIVA) 18 mcg inhalation capsule Inhale 1 capsule (18 mcg total) into the lungs once daily. Controller  Qty: 30 capsule, Refills: 0    Associated Diagnoses: Chronic obstructive pulmonary disease, unspecified COPD type             Orders Placed This Encounter   Procedures    XR Gastric tube check, non-radiologist performed       Imaging Results              XR Gastric tube check, non-radiologist performed (In process)                     ED Course as of 01/09/24 0647   Tue Jan 09, 2024   0550 PEG tube change:  Old traction type feeding tube removed with gentle continuous pull with intragastric portion of stopper intact.  New 24 English PEG tube inserted without resistance with 20 cc balloon inflated with normal saline.  X-ray ordered to confirm placement. [MR]   0628 XR Gastric tube:  Tube in good position with gastric filling noted. (Independently interpreted by me)   [MR]      ED Course User Index  [MR] Prashanth Geiger MD       MDM:    64 y.o. female with damaged PEG tube with distal end absent.  Old PEG tube removed with new feeding tube 24 English easily inserted and confirmed with x-ray.  No sign of other medical process requiring intervention.  Follow up with PCP.    Diagnoses:    1. Peg tube malfunction status post change       Prashanth Geiger MD  01/09/24 0632

## 2024-02-07 NOTE — ASSESSMENT & PLAN NOTE
PT LEFT HAND SPLINTED. PT TOLERATED WELL   Urine cultures grew Proteus mirabilis ESBL on admission  Continue with IV antibiotics   Follow up CBC and CMP  Urine culture negative on 12/11/2023

## 2024-02-14 ENCOUNTER — HOSPITAL ENCOUNTER (EMERGENCY)
Facility: HOSPITAL | Age: 64
Discharge: HOME | End: 2024-02-14
Attending: EMERGENCY MEDICINE
Payer: MEDICARE

## 2024-02-14 VITALS
TEMPERATURE: 99 F | OXYGEN SATURATION: 97 % | RESPIRATION RATE: 20 BRPM | WEIGHT: 136 LBS | HEART RATE: 82 BPM | BODY MASS INDEX: 27.47 KG/M2 | SYSTOLIC BLOOD PRESSURE: 152 MMHG | DIASTOLIC BLOOD PRESSURE: 70 MMHG

## 2024-02-14 DIAGNOSIS — Z93.1 S/P PERCUTANEOUS ENDOSCOPIC GASTROSTOMY (PEG) TUBE PLACEMENT: ICD-10-CM

## 2024-02-14 PROCEDURE — 99283 EMERGENCY DEPT VISIT LOW MDM: CPT | Mod: 25

## 2024-02-14 PROCEDURE — 43762 RPLC GTUBE NO REVJ TRC: CPT

## 2024-02-14 PROCEDURE — 25500020 PHARM REV CODE 255: Performed by: EMERGENCY MEDICINE

## 2024-02-14 RX ADMIN — DIATRIZOATE MEGLUMINE AND DIATRIZOATE SODIUM 30 ML: 660; 100 LIQUID ORAL; RECTAL at 01:02

## 2024-02-14 NOTE — ED PROVIDER NOTES
Encounter Date: 2/14/2024       History     Chief Complaint   Patient presents with    G tube issue     Leaking around the gtube.      64-year-old female presented emergency department brought in by EMS sent from nursing home for fluid leaking around the PEG tube.  Patient from nursing home had a stroke and is bedbound and has a PEG tube and indwelling urinary catheter.  Patient nonverbal and has no complaints.  Sent here for replacement of leaking PEG tube      Review of patient's allergies indicates:  No Known Allergies  Past Medical History:   Diagnosis Date    Arthritis     Complete tear of left rotator cuff 11/09/2017    COPD (chronic obstructive pulmonary disease)     COVID-19 infection 07/02/2021    Diabetes mellitus     H/o Cerebrovascular accident (CVA) of left pontine structure 12/12/2019    Hypertension     Personal history of colonic polyps     Stroke     Wears glasses      Past Surgical History:   Procedure Laterality Date    COLONOSCOPY N/A 4/11/2017    Procedure: COLONOSCOPY;  Surgeon: Jared Antonio MD;  Location: [facility];  Service: Endoscopy;  Laterality: N/A;    CYSTOSCOPY W/ RETROGRADES N/A 12/14/2023    Procedure: CYSTOSCOPY, WITH RETROGRADE PYELOGRAM;  Surgeon: Sergio Soria MD;  Location: [facility] OR;  Service: Urology;  Laterality: N/A;    ECHOCARDIOGRAM,TRANSESOPHAGEAL N/A 12/12/2023    Procedure: Transesophageal echo (GT) intra-procedure log documentation;  Surgeon: Antoine Rivera MD;  Location: [facility] CATH/EP LAB;  Service: Cardiology;  Laterality: N/A;    HYSTERECTOMY      INSERTION OF CATHETER N/A 12/14/2023    Procedure: INSERTION, CATHETER;  Surgeon: Sergio Soria MD;  Location: [facility] OR;  Service: Urology;  Laterality: N/A;    OOPHORECTOMY      SHOULDER SURGERY      R    TRANSESOPHAGEAL ECHOCARDIOGRAM WITH POSSIBLE CARDIOVERSION (GT W/ POSS CARDIOVERSION) N/A 5/4/2023    Procedure: Transesophageal echo (GT) intra-procedure log documentation;  Surgeon: Blade NICHOLE  MD Marjan;  Location: [place] CATH/EP LAB;  Service: Cardiology;  Laterality: N/A;     Family History   Problem Relation Age of Onset    Diabetes Mother     Asthma Mother     Hypertension Mother     Diabetes Father     Diabetes Brother     Hypertension Brother     Anemia Daughter     Glaucoma Neg Hx     Macular degeneration Neg Hx     Retinal detachment Neg Hx      Social History     Tobacco Use    Smoking status: Never    Smokeless tobacco: Never   Substance Use Topics    Alcohol use: No    Drug use: No     Review of Systems   Unable to perform ROS: Patient nonverbal       Physical Exam     Initial Vitals [02/14/24 1222]   BP Pulse Resp Temp SpO2   (!) 142/65 91 20 98.5 °F (36.9 °C) 97 %      MAP       --         Physical Exam    Nursing note and vitals reviewed.  Constitutional: She appears well-developed and well-nourished.   HENT:   Head: Normocephalic and atraumatic.   Nose: Nose normal.   Mouth/Throat: Oropharynx is clear and moist.   Eyes: Conjunctivae and EOM are normal. Pupils are equal, round, and reactive to light.   Neck: Neck supple. No thyromegaly present. No tracheal deviation present. No JVD present.   Normal range of motion.  Cardiovascular:  Normal rate, regular rhythm, normal heart sounds and intact distal pulses.           No murmur heard.  Pulmonary/Chest: Breath sounds normal. No stridor. No respiratory distress. She has no wheezes. She has no rales.   Abdominal: Abdomen is soft. Bowel sounds are normal.   PEG tube in place   Musculoskeletal:         General: No edema.      Cervical back: Normal range of motion and neck supple.      Comments: Contractures noted of extremities     Neurological:   Awake and contractures noted   Skin: Skin is warm. Capillary refill takes less than 2 seconds.         ED Course   Procedures  Labs Reviewed - No data to display       Imaging Results              X-Ray Abdomen AP 1 View (KUB) (Final result)  Result time 02/14/24 13:13:32      Final result by Dakota  Tammy PRINGLE MD (02/14/24 13:13:32)                   Narrative:    KUB    Clinical history is PEG tube placement    Proximally 60 mL of contrast was injected into the PEG tube. The PEG tube is within the stomach. There is opacification of the stomach and proximal jejunum. There is no extravasation of contrast.  There is a normal bowel gas pattern. No organomegaly or abnormal soft-tissue mass. The lung bases are clear.    IMPRESSION: PEG tube is within the stomach    Normal bowel gas pattern    Electronically signed by:  Tammy Duncan MD  02/14/2024 01:13 PM CST Workstation: [id number]                                     Medications   diatrizoate meglumineand-diatrizoate sodium (GASTROVIEW) solution 50 mL (30 mLs Per G Tube Given 2/14/24 1300)     Medical Decision Making  PEG tube in place and 60 cc of fluid removed from the balloon which likely is making stomach fall and likely contributing to leakage.  PEG tube replaced and placement confirmed by radiographic evaluation.  30 cc of fluid placed in the balloon.  This should help with improvement of leakage.  Discharged with return precautions and follow-up    Amount and/or Complexity of Data Reviewed  Radiology: ordered. Decision-making details documented in ED Course.    Risk  Prescription drug management.                                      Clinical Impression:  Final diagnoses:  [Z93.1] S/P percutaneous endoscopic gastrostomy (PEG) tube placement  [Z93.1] S/P percutaneous endoscopic gastrostomy (PEG) tube placement - With Gastrografin          ED Disposition Condition    Discharge Stable          ED Prescriptions    None       Follow-up Information       Follow up With Specialties Details Why Contact Info    Cristina Ren MD Family Medicine In 2 days  [address] [facility] [facility] [place] [address]  [phone]               Jatinder Parra MD  02/14/24 2978

## 2024-02-15 NOTE — PLAN OF CARE
MANNY spoke to Caridad with [facility] this date.  Confirmed that Pt was discharged back to SNF on 10/27/2023 and was not yet a long term senior living patient.      Disposition changed and coding notified.     02/15/24 1325   Final Note   Assessment Type Final Discharge Note   Anticipated Discharge Disposition SNF   Post-Acute Status   Discharge Delays None known at this time

## 2024-02-23 ENCOUNTER — HOSPITAL ENCOUNTER (INPATIENT)
Facility: HOSPITAL | Age: 64
LOS: 5 days | DRG: 377 | End: 2024-02-29
Attending: EMERGENCY MEDICINE | Admitting: INTERNAL MEDICINE
Payer: MEDICARE

## 2024-02-23 DIAGNOSIS — A41.9 SEPSIS, DUE TO UNSPECIFIED ORGANISM, UNSPECIFIED WHETHER ACUTE ORGAN DYSFUNCTION PRESENT: Primary | ICD-10-CM

## 2024-02-23 DIAGNOSIS — D75.839 THROMBOCYTOSIS: ICD-10-CM

## 2024-02-23 DIAGNOSIS — E78.2 MIXED HYPERLIPIDEMIA: ICD-10-CM

## 2024-02-23 DIAGNOSIS — I63.9 CEREBROVASCULAR ACCIDENT (CVA) OF LEFT PONTINE STRUCTURE: ICD-10-CM

## 2024-02-23 DIAGNOSIS — N39.0 URINARY TRACT INFECTION WITHOUT HEMATURIA, SITE UNSPECIFIED: ICD-10-CM

## 2024-02-23 DIAGNOSIS — D64.9 ACUTE ON CHRONIC ANEMIA: ICD-10-CM

## 2024-02-23 PROBLEM — Z93.1 PEG (PERCUTANEOUS ENDOSCOPIC GASTROSTOMY) STATUS: Status: ACTIVE | Noted: 2024-02-23

## 2024-02-23 PROBLEM — Z78.9 FREQUENT HOSPITAL ADMISSIONS: Status: ACTIVE | Noted: 2024-02-23

## 2024-02-23 PROBLEM — G40.909 SEIZURE DISORDER: Status: ACTIVE | Noted: 2024-02-23

## 2024-02-23 LAB
ABO + RH BLD: NORMAL
ALBUMIN SERPL BCP-MCNC: 3.4 G/DL (ref 3.5–5.2)
ALP SERPL-CCNC: 98 U/L (ref 55–135)
ALT SERPL W/O P-5'-P-CCNC: 17 U/L (ref 10–44)
ANION GAP SERPL CALC-SCNC: 9 MMOL/L (ref 8–16)
AST SERPL-CCNC: 16 U/L (ref 10–40)
BACTERIA #/AREA URNS HPF: ABNORMAL /HPF
BASOPHILS # BLD AUTO: 0.1 K/UL (ref 0–0.2)
BASOPHILS NFR BLD: 0.4 % (ref 0–1.9)
BILIRUB SERPL-MCNC: 0.4 MG/DL (ref 0.1–1)
BILIRUB UR QL STRIP: NEGATIVE
BLD GP AB SCN CELLS X3 SERPL QL: NORMAL
BNP SERPL-MCNC: 22 PG/ML (ref 0–99)
BUN SERPL-MCNC: 61 MG/DL (ref 8–23)
CALCIUM SERPL-MCNC: 9.7 MG/DL (ref 8.7–10.5)
CHLORIDE SERPL-SCNC: 109 MMOL/L (ref 95–110)
CLARITY UR: ABNORMAL
CO2 SERPL-SCNC: 29 MMOL/L (ref 23–29)
COLOR UR: YELLOW
CREAT SERPL-MCNC: 1.4 MG/DL (ref 0.5–1.4)
DIFFERENTIAL METHOD BLD: ABNORMAL
EOSINOPHIL # BLD AUTO: 0.2 K/UL (ref 0–0.5)
EOSINOPHIL NFR BLD: 0.8 % (ref 0–8)
ERYTHROCYTE [DISTWIDTH] IN BLOOD BY AUTOMATED COUNT: 21.5 % (ref 11.5–14.5)
EST. GFR  (NO RACE VARIABLE): 41.5 ML/MIN/1.73 M^2
GLUCOSE SERPL-MCNC: 149 MG/DL (ref 70–110)
GLUCOSE SERPL-MCNC: 159 MG/DL (ref 70–110)
GLUCOSE UR QL STRIP: NEGATIVE
HCT VFR BLD AUTO: 24.8 % (ref 37–48.5)
HGB BLD-MCNC: 7.7 G/DL (ref 12–16)
HGB UR QL STRIP: ABNORMAL
HYALINE CASTS #/AREA URNS LPF: 1 /LPF
IMM GRANULOCYTES # BLD AUTO: 0.43 K/UL (ref 0–0.04)
IMM GRANULOCYTES NFR BLD AUTO: 1.9 % (ref 0–0.5)
KETONES UR QL STRIP: NEGATIVE
LACTATE SERPL-SCNC: 0.7 MMOL/L (ref 0.5–1.9)
LACTATE SERPL-SCNC: 1.1 MMOL/L (ref 0.5–1.9)
LEUKOCYTE ESTERASE UR QL STRIP: ABNORMAL
LYMPHOCYTES # BLD AUTO: 2.1 K/UL (ref 1–4.8)
LYMPHOCYTES NFR BLD: 9.3 % (ref 18–48)
MAGNESIUM SERPL-MCNC: 2.3 MG/DL (ref 1.6–2.6)
MCH RBC QN AUTO: 24.9 PG (ref 27–31)
MCHC RBC AUTO-ENTMCNC: 31 G/DL (ref 32–36)
MCV RBC AUTO: 80 FL (ref 82–98)
MICROSCOPIC COMMENT: ABNORMAL
MONOCYTES # BLD AUTO: 2.4 K/UL (ref 0.3–1)
MONOCYTES NFR BLD: 10.6 % (ref 4–15)
NEUTROPHILS # BLD AUTO: 17.2 K/UL (ref 1.8–7.7)
NEUTROPHILS NFR BLD: 77 % (ref 38–73)
NITRITE UR QL STRIP: NEGATIVE
NON-SQ EPI CELLS #/AREA URNS HPF: 1 /HPF
NRBC BLD-RTO: 0 /100 WBC
OB PNL STL: POSITIVE
PH UR STRIP: 5 [PH] (ref 5–8)
PLATELET # BLD AUTO: 847 K/UL (ref 150–450)
PLATELET BLD QL SMEAR: ABNORMAL
PMV BLD AUTO: 10.4 FL (ref 9.2–12.9)
POTASSIUM SERPL-SCNC: 4.6 MMOL/L (ref 3.5–5.1)
PROT SERPL-MCNC: 8.7 G/DL (ref 6–8.4)
PROT UR QL STRIP: ABNORMAL
RBC # BLD AUTO: 3.09 M/UL (ref 4–5.4)
RBC #/AREA URNS HPF: 93 /HPF (ref 0–4)
SODIUM SERPL-SCNC: 147 MMOL/L (ref 136–145)
SP GR UR STRIP: 1.01 (ref 1–1.03)
SPECIMEN OUTDATE: NORMAL
TARGETS BLD QL SMEAR: ABNORMAL
URN SPEC COLLECT METH UR: ABNORMAL
UROBILINOGEN UR STRIP-ACNC: NEGATIVE EU/DL
WBC # BLD AUTO: 22.38 K/UL (ref 3.9–12.7)
WBC #/AREA URNS HPF: 47 /HPF (ref 0–5)

## 2024-02-23 PROCEDURE — 86850 RBC ANTIBODY SCREEN: CPT | Performed by: EMERGENCY MEDICINE

## 2024-02-23 PROCEDURE — 87040 BLOOD CULTURE FOR BACTERIA: CPT | Performed by: EMERGENCY MEDICINE

## 2024-02-23 PROCEDURE — 82962 GLUCOSE BLOOD TEST: CPT

## 2024-02-23 PROCEDURE — 96375 TX/PRO/DX INJ NEW DRUG ADDON: CPT

## 2024-02-23 PROCEDURE — 80053 COMPREHEN METABOLIC PANEL: CPT | Performed by: EMERGENCY MEDICINE

## 2024-02-23 PROCEDURE — 87086 URINE CULTURE/COLONY COUNT: CPT | Performed by: EMERGENCY MEDICINE

## 2024-02-23 PROCEDURE — 36415 COLL VENOUS BLD VENIPUNCTURE: CPT | Performed by: EMERGENCY MEDICINE

## 2024-02-23 PROCEDURE — 85025 COMPLETE CBC W/AUTO DIFF WBC: CPT | Performed by: EMERGENCY MEDICINE

## 2024-02-23 PROCEDURE — 83880 ASSAY OF NATRIURETIC PEPTIDE: CPT | Performed by: EMERGENCY MEDICINE

## 2024-02-23 PROCEDURE — 83605 ASSAY OF LACTIC ACID: CPT | Performed by: INTERNAL MEDICINE

## 2024-02-23 PROCEDURE — 36415 COLL VENOUS BLD VENIPUNCTURE: CPT | Performed by: INTERNAL MEDICINE

## 2024-02-23 PROCEDURE — 63600175 PHARM REV CODE 636 W HCPCS: Performed by: INTERNAL MEDICINE

## 2024-02-23 PROCEDURE — 82272 OCCULT BLD FECES 1-3 TESTS: CPT | Performed by: EMERGENCY MEDICINE

## 2024-02-23 PROCEDURE — 96365 THER/PROPH/DIAG IV INF INIT: CPT

## 2024-02-23 PROCEDURE — 99285 EMERGENCY DEPT VISIT HI MDM: CPT | Mod: 25

## 2024-02-23 PROCEDURE — 25000003 PHARM REV CODE 250: Performed by: INTERNAL MEDICINE

## 2024-02-23 PROCEDURE — G0378 HOSPITAL OBSERVATION PER HR: HCPCS

## 2024-02-23 PROCEDURE — 81001 URINALYSIS AUTO W/SCOPE: CPT | Performed by: EMERGENCY MEDICINE

## 2024-02-23 PROCEDURE — 93005 ELECTROCARDIOGRAM TRACING: CPT | Performed by: GENERAL PRACTICE

## 2024-02-23 PROCEDURE — C9113 INJ PANTOPRAZOLE SODIUM, VIA: HCPCS | Performed by: INTERNAL MEDICINE

## 2024-02-23 PROCEDURE — 93010 ELECTROCARDIOGRAM REPORT: CPT | Mod: ,,, | Performed by: GENERAL PRACTICE

## 2024-02-23 PROCEDURE — 96366 THER/PROPH/DIAG IV INF ADDON: CPT

## 2024-02-23 PROCEDURE — 83735 ASSAY OF MAGNESIUM: CPT | Performed by: EMERGENCY MEDICINE

## 2024-02-23 RX ORDER — ONDANSETRON HYDROCHLORIDE 2 MG/ML
4 INJECTION, SOLUTION INTRAVENOUS EVERY 6 HOURS PRN
Status: DISCONTINUED | OUTPATIENT
Start: 2024-02-23 | End: 2024-02-29 | Stop reason: HOSPADM

## 2024-02-23 RX ORDER — SODIUM,POTASSIUM PHOSPHATES 280-250MG
2 POWDER IN PACKET (EA) ORAL
Status: DISCONTINUED | OUTPATIENT
Start: 2024-02-23 | End: 2024-02-28

## 2024-02-23 RX ORDER — LEVETIRACETAM 100 MG/ML
500 SOLUTION ORAL 2 TIMES DAILY
Status: DISCONTINUED | OUTPATIENT
Start: 2024-02-23 | End: 2024-02-29 | Stop reason: HOSPADM

## 2024-02-23 RX ORDER — PANTOPRAZOLE SODIUM 40 MG/10ML
40 INJECTION, POWDER, LYOPHILIZED, FOR SOLUTION INTRAVENOUS EVERY 12 HOURS
Status: DISCONTINUED | OUTPATIENT
Start: 2024-02-23 | End: 2024-02-25

## 2024-02-23 RX ORDER — GLUCAGON 1 MG
1 KIT INJECTION
Status: DISCONTINUED | OUTPATIENT
Start: 2024-02-23 | End: 2024-02-29 | Stop reason: HOSPADM

## 2024-02-23 RX ORDER — LANOLIN ALCOHOL/MO/W.PET/CERES
800 CREAM (GRAM) TOPICAL
Status: DISCONTINUED | OUTPATIENT
Start: 2024-02-23 | End: 2024-02-29 | Stop reason: HOSPADM

## 2024-02-23 RX ORDER — PANTOPRAZOLE SODIUM 40 MG/10ML
80 INJECTION, POWDER, LYOPHILIZED, FOR SOLUTION INTRAVENOUS
Status: COMPLETED | OUTPATIENT
Start: 2024-02-23 | End: 2024-02-23

## 2024-02-23 RX ORDER — ASCORBIC ACID 500 MG
500 TABLET ORAL 2 TIMES DAILY
COMMUNITY

## 2024-02-23 RX ORDER — LANOLIN ALCOHOL/MO/W.PET/CERES
400 CREAM (GRAM) TOPICAL DAILY
Status: DISCONTINUED | OUTPATIENT
Start: 2024-02-24 | End: 2024-02-29 | Stop reason: HOSPADM

## 2024-02-23 RX ORDER — IBUPROFEN 200 MG
16 TABLET ORAL
Status: DISCONTINUED | OUTPATIENT
Start: 2024-02-23 | End: 2024-02-29 | Stop reason: HOSPADM

## 2024-02-23 RX ORDER — INSULIN LISPRO 100 [IU]/ML
2 INJECTION, SOLUTION INTRAVENOUS; SUBCUTANEOUS
COMMUNITY

## 2024-02-23 RX ORDER — POLYETHYLENE GLYCOL 3350 17 G/17G
17 POWDER, FOR SOLUTION ORAL DAILY
Status: DISCONTINUED | OUTPATIENT
Start: 2024-02-24 | End: 2024-02-29 | Stop reason: HOSPADM

## 2024-02-23 RX ORDER — METOCLOPRAMIDE HYDROCHLORIDE 5 MG/5ML
10 SOLUTION ORAL 3 TIMES DAILY
COMMUNITY
Start: 2024-02-12

## 2024-02-23 RX ORDER — BACLOFEN 5 MG/1
5 TABLET ORAL EVERY 8 HOURS PRN
Status: DISCONTINUED | OUTPATIENT
Start: 2024-02-23 | End: 2024-02-29 | Stop reason: HOSPADM

## 2024-02-23 RX ORDER — INSULIN LISPRO 100 [IU]/ML
0-20 INJECTION, SOLUTION INTRAVENOUS; SUBCUTANEOUS SEE ADMIN INSTRUCTIONS
COMMUNITY
Start: 2024-02-09

## 2024-02-23 RX ORDER — OMEPRAZOLE 20 MG/1
20 CAPSULE, DELAYED RELEASE ORAL DAILY
COMMUNITY
Start: 2024-02-07

## 2024-02-23 RX ORDER — IBUPROFEN 200 MG
24 TABLET ORAL
Status: DISCONTINUED | OUTPATIENT
Start: 2024-02-23 | End: 2024-02-29 | Stop reason: HOSPADM

## 2024-02-23 RX ORDER — INSULIN ASPART 100 [IU]/ML
0-10 INJECTION, SOLUTION INTRAVENOUS; SUBCUTANEOUS
Status: DISCONTINUED | OUTPATIENT
Start: 2024-02-23 | End: 2024-02-29 | Stop reason: HOSPADM

## 2024-02-23 RX ORDER — ACETAMINOPHEN 325 MG/1
650 TABLET ORAL EVERY 4 HOURS PRN
Status: DISCONTINUED | OUTPATIENT
Start: 2024-02-23 | End: 2024-02-24

## 2024-02-23 RX ORDER — SODIUM CHLORIDE 9 MG/ML
1000 INJECTION, SOLUTION INTRAVENOUS
Status: COMPLETED | OUTPATIENT
Start: 2024-02-23 | End: 2024-02-23

## 2024-02-23 RX ORDER — ALLOPURINOL 100 MG/1
100 TABLET ORAL DAILY
COMMUNITY
Start: 2024-02-16

## 2024-02-23 RX ORDER — ALUMINUM HYDROXIDE, MAGNESIUM HYDROXIDE, AND SIMETHICONE 1200; 120; 1200 MG/30ML; MG/30ML; MG/30ML
30 SUSPENSION ORAL 4 TIMES DAILY PRN
Status: DISCONTINUED | OUTPATIENT
Start: 2024-02-23 | End: 2024-02-29 | Stop reason: HOSPADM

## 2024-02-23 RX ORDER — METOPROLOL TARTRATE 25 MG/1
25 TABLET, FILM COATED ORAL 2 TIMES DAILY
Status: DISCONTINUED | OUTPATIENT
Start: 2024-02-23 | End: 2024-02-29 | Stop reason: HOSPADM

## 2024-02-23 RX ORDER — ACETAMINOPHEN 325 MG/1
650 TABLET ORAL EVERY 8 HOURS PRN
Status: DISCONTINUED | OUTPATIENT
Start: 2024-02-23 | End: 2024-02-29 | Stop reason: HOSPADM

## 2024-02-23 RX ADMIN — PIPERACILLIN SODIUM AND TAZOBACTAM SODIUM 3.38 G: 3; .375 INJECTION, POWDER, LYOPHILIZED, FOR SOLUTION INTRAVENOUS at 04:02

## 2024-02-23 RX ADMIN — METOPROLOL TARTRATE 25 MG: 25 TABLET, FILM COATED ORAL at 08:02

## 2024-02-23 RX ADMIN — LEVETIRACETAM 500 MG: 100 SOLUTION ORAL at 08:02

## 2024-02-23 RX ADMIN — PANTOPRAZOLE SODIUM 80 MG: 40 INJECTION, POWDER, LYOPHILIZED, FOR SOLUTION INTRAVENOUS at 03:02

## 2024-02-23 RX ADMIN — SODIUM CHLORIDE 1000 ML: 9 INJECTION, SOLUTION INTRAVENOUS at 03:02

## 2024-02-23 NOTE — PHARMACY MED REC
"              .  Admission Medication History     The home medication history was taken by Sterling Damon.    You may go to "Admission" then "Reconcile Home Medications" tabs to review and/or act upon these items.     The home medication list has been updated by the Pharmacy department.   Please read ALL comments highlighted in yellow.   Please address this information as you see fit.    Feel free to contact us if you have any questions or require assistance.        Medications listed below were obtained from: Patient/family and Analytic software- hyaqu  No current facility-administered medications on file prior to encounter.     Current Outpatient Medications on File Prior to Encounter   Medication Sig Dispense Refill    allopurinoL (ZYLOPRIM) 100 MG tablet 100 mg by Per G Tube route once daily.      amLODIPine (NORVASC) 10 MG tablet Take 1 tablet (10 mg total) by mouth once daily. (Patient taking differently: 10 mg by Per G Tube route once daily.) 90 tablet 3    apixaban (ELIQUIS) 2.5 mg Tab 2.5 mg by Per G Tube route 2 (two) times daily.      ascorbic acid, vitamin C, (VITAMIN C) 500 MG tablet 500 mg by Per G Tube route 2 (two) times daily.      aspirin 81 MG Chew Take 1 tablet (81 mg total) by mouth once daily. (Patient taking differently: 81 mg by Per G Tube route once daily.) 30 tablet 0    atorvastatin (LIPITOR) 80 MG tablet Take 1 tablet (80 mg total) by mouth every evening. (Patient taking differently: 80 mg by Per G Tube route every evening.) 30 tablet 0    folic acid (FOLVITE) 1 MG tablet 1 tablet by Per G Tube route every morning.      furosemide (LASIX) 40 MG tablet 40 mg by Per G Tube route once daily.      HUMALOG KWIKPEN INSULIN 100 unit/mL pen Inject 0-20 Units into the skin As instructed (inject 4 times daily per sliding scale). 0 -149 = 0 units  150 - 199 = 4 units  200 - 249 = 8 units  250 - 299 = 12 units  300 - 349 = 16 units  350 - 1000 = 20 units Call MD      insulin (LANTUS SOLOSTAR U-100 " INSULIN) glargine 100 units/mL SubQ pen Inject 35 Units into the skin once daily.      insulin lispro 100 unit/mL injection Inject 2 Units into the skin 3 (three) times daily before meals. 07:30  11:30  16:30      latanoprost 0.005 % ophthalmic solution Place 1 drop into both eyes once daily. (Patient taking differently: Place 1 drop into both eyes every evening.) 7.5 mL 6    levETIRAcetam (KEPPRA) 100 mg/mL Soln 500 mg by Per G Tube route 2 (two) times daily.      losartan (COZAAR) 100 MG tablet Take 1 tablet (100 mg total) by mouth once daily. (Patient taking differently: 100 mg by Per G Tube route once daily.) 90 tablet 3    magnesium oxide (MAG-OX) 400 mg (241.3 mg magnesium) tablet 400 mg by Per G Tube route once daily.      metFORMIN (GLUCOPHAGE) 500 MG tablet 1,000 mg by Per G Tube route 2 (two) times daily with meals.      metoclopramide HCl (REGLAN) 5 mg/5 mL Soln 10 mg by Per G Tube route 3 (three) times daily.      metoprolol tartrate (LOPRESSOR) 25 MG tablet 25 mg by Per G Tube route 2 (two) times daily.      multivitamin (THERAGRAN) per tablet 1 tablet by Per G Tube route once daily.      omeprazole (PRILOSEC) 20 MG capsule Take 20 mg by mouth once daily. Per G-Tube      polyethylene glycol (GLYCOLAX) 17 gram PwPk 17 g by Per G Tube route once daily.      thiamine (VITAMIN B-1) 100 MG tablet 100 mg by Per G Tube route once daily.      zinc sulfate (ZINC-220 ORAL) 1 tablet by PEG Tube route Daily.      baclofen (LIORESAL) 5 mg Tab tablet 5 mg by Per G Tube route every 8 (eight) hours as needed (muscle spasms).      cloNIDine (CATAPRES) 0.1 MG tablet Take 1 tablet (0.1 mg total) by mouth 3 (three) times daily. (Patient taking differently: Take 0.1 mg by mouth 4 (four) times daily as needed (For systolic over 160).) 90 tablet 11    tiotropium (SPIRIVA) 18 mcg inhalation capsule Inhale 1 capsule (18 mcg total) into the lungs once daily. Controller 30 capsule 0    [DISCONTINUED] blood-glucose meter (TRUE  METRIX GLUCOSE METER) kit Use as instructed 1 each 0    [DISCONTINUED] insulin aspart U-100 (NOVOLOG) 100 unit/mL injection Inject 4 Units into the skin As instructed for High Blood Sugar (Four times daily per sliding scale). 0 -150 = 0 units  151 - 200 = 4 units  201 - 250 = 8 units  251 - 300 = 10 units  301 - 350 = 12 units  351 - 400 = 16 units  401 - 1000 = 20 units call MD      [DISCONTINUED] lancing device (TRUEDRAW LANCING DEVICE) Misc Inject 1 Device into the skin 2 (two) times daily. 1 each 3           Sterling Damon  EXT 1924

## 2024-02-23 NOTE — H&P
[facility] access hospital - Emergency Dept  Hospital Medicine  History & Physical    Patient Name: Steff Johnson  MRN: [id number]  Patient Class: OP- Observation  Admission Date: 2/23/2024  Attending Physician: Roger Berry MD   Primary Care Provider: Cristina Ren MD         Patient information was obtained from past medical records, ER records, and ER MD/Facility records .     Subjective:     Principal Problem:Acute on chronic anemia    Chief Complaint:   Chief Complaint   Patient presents with    Weakness     Pt sent from [facility] with abnormal labs. EMS stated that pts H&H is low. Pt is non verbal and is at baseline.        HPI: 64 year old pt getting transferred from facility with H/o weakness/ Low H/H and bladimir  Pt is bed bound and aphasic and has History of frequent hospitalizations  Random labs were obtained in facility and pt was found to be anemic  Also reports of dark stools too  Transferred to ER and got admitted  Further History unavailable       Past Medical History:   Diagnosis Date    Arthritis     Complete tear of left rotator cuff 11/09/2017    COPD (chronic obstructive pulmonary disease)     COVID-19 infection 07/02/2021    Diabetes mellitus     H/o Cerebrovascular accident (CVA) of left pontine structure 12/12/2019    Hypertension     Personal history of colonic polyps     Stroke     Wears glasses        Past Surgical History:   Procedure Laterality Date    COLONOSCOPY N/A 4/11/2017    Procedure: COLONOSCOPY;  Surgeon: Jared Antonio MD;  Location: [facility];  Service: Endoscopy;  Laterality: N/A;    CYSTOSCOPY W/ RETROGRADES N/A 12/14/2023    Procedure: CYSTOSCOPY, WITH RETROGRADE PYELOGRAM;  Surgeon: Sergio Soria MD;  Location: [facility] OR;  Service: Urology;  Laterality: N/A;    ECHOCARDIOGRAM,TRANSESOPHAGEAL N/A 12/12/2023    Procedure: Transesophageal echo (GT) intra-procedure log documentation;  Surgeon: Antoine Rivera MD;  Location: [facility] CATH/EP LAB;  Service:  Cardiology;  Laterality: N/A;    HYSTERECTOMY      INSERTION OF CATHETER N/A 12/14/2023    Procedure: INSERTION, CATHETER;  Surgeon: Sergio Soria MD;  Location: [facility] OR;  Service: Urology;  Laterality: N/A;    OOPHORECTOMY      SHOULDER SURGERY      R    TRANSESOPHAGEAL ECHOCARDIOGRAM WITH POSSIBLE CARDIOVERSION (GT W/ POSS CARDIOVERSION) N/A 5/4/2023    Procedure: Transesophageal echo (GT) intra-procedure log documentation;  Surgeon: Blade Phillip MD;  Location: [facility] CATH/EP LAB;  Service: Cardiology;  Laterality: N/A;       Review of patient's allergies indicates:  No Known Allergies    No current facility-administered medications on file prior to encounter.     Current Outpatient Medications on File Prior to Encounter   Medication Sig    allopurinoL (ZYLOPRIM) 100 MG tablet 100 mg by Per G Tube route once daily.    amLODIPine (NORVASC) 10 MG tablet Take 1 tablet (10 mg total) by mouth once daily. (Patient taking differently: 10 mg by Per G Tube route once daily.)    apixaban (ELIQUIS) 2.5 mg Tab 2.5 mg by Per G Tube route 2 (two) times daily.    ascorbic acid, vitamin C, (VITAMIN C) 500 MG tablet 500 mg by Per G Tube route 2 (two) times daily.    aspirin 81 MG Chew Take 1 tablet (81 mg total) by mouth once daily. (Patient taking differently: 81 mg by Per G Tube route once daily.)    atorvastatin (LIPITOR) 80 MG tablet Take 1 tablet (80 mg total) by mouth every evening. (Patient taking differently: 80 mg by Per G Tube route every evening.)    folic acid (FOLVITE) 1 MG tablet 1 tablet by Per G Tube route every morning.    furosemide (LASIX) 40 MG tablet 40 mg by Per G Tube route once daily.    HUMALOG KWIKPEN INSULIN 100 unit/mL pen Inject 0-20 Units into the skin As instructed (inject 4 times daily per sliding scale). 0 -149 = 0 units  150 - 199 = 4 units  200 - 249 = 8 units  250 - 299 = 12 units  300 - 349 = 16 units  350 - 1000 = 20 units Call MD    insulin (LANTUS SOLOSTAR U-100 INSULIN) glargine  100 units/mL SubQ pen Inject 35 Units into the skin once daily.    insulin lispro 100 unit/mL injection Inject 2 Units into the skin 3 (three) times daily before meals. 07:30  11:30  16:30    latanoprost 0.005 % ophthalmic solution Place 1 drop into both eyes once daily. (Patient taking differently: Place 1 drop into both eyes every evening.)    levETIRAcetam (KEPPRA) 100 mg/mL Soln 500 mg by Per G Tube route 2 (two) times daily.    losartan (COZAAR) 100 MG tablet Take 1 tablet (100 mg total) by mouth once daily. (Patient taking differently: 100 mg by Per G Tube route once daily.)    magnesium oxide (MAG-OX) 400 mg (241.3 mg magnesium) tablet 400 mg by Per G Tube route once daily.    metFORMIN (GLUCOPHAGE) 500 MG tablet 1,000 mg by Per G Tube route 2 (two) times daily with meals.    metoclopramide HCl (REGLAN) 5 mg/5 mL Soln 10 mg by Per G Tube route 3 (three) times daily.    metoprolol tartrate (LOPRESSOR) 25 MG tablet 25 mg by Per G Tube route 2 (two) times daily.    multivitamin (THERAGRAN) per tablet 1 tablet by Per G Tube route once daily.    omeprazole (PRILOSEC) 20 MG capsule Take 20 mg by mouth once daily. Per G-Tube    polyethylene glycol (GLYCOLAX) 17 gram PwPk 17 g by Per G Tube route once daily.    thiamine (VITAMIN B-1) 100 MG tablet 100 mg by Per G Tube route once daily.    zinc sulfate (ZINC-220 ORAL) 1 tablet by PEG Tube route Daily.    baclofen (LIORESAL) 5 mg Tab tablet 5 mg by Per G Tube route every 8 (eight) hours as needed (muscle spasms).    cloNIDine (CATAPRES) 0.1 MG tablet Take 1 tablet (0.1 mg total) by mouth 3 (three) times daily. (Patient taking differently: Take 0.1 mg by mouth 4 (four) times daily as needed (For systolic over 160).)    tiotropium (SPIRIVA) 18 mcg inhalation capsule Inhale 1 capsule (18 mcg total) into the lungs once daily. Controller    [DISCONTINUED] blood-glucose meter (TRUE METRIX GLUCOSE METER) kit Use as instructed    [DISCONTINUED] insulin aspart U-100 (NOVOLOG)  100 unit/mL injection Inject 4 Units into the skin As instructed for High Blood Sugar (Four times daily per sliding scale). 0 -150 = 0 units  151 - 200 = 4 units  201 - 250 = 8 units  251 - 300 = 10 units  301 - 350 = 12 units  351 - 400 = 16 units  401 - 1000 = 20 units call MD    [DISCONTINUED] lancing device (TRUEDRAW LANCING DEVICE) Misc Inject 1 Device into the skin 2 (two) times daily.     Family History       Problem Relation (Age of Onset)    Anemia Daughter    Asthma Mother    Diabetes Mother, Father, Brother    Hypertension Mother, Brother          Tobacco Use    Smoking status: Never    Smokeless tobacco: Never   Substance and Sexual Activity    Alcohol use: No    Drug use: No    Sexual activity: Not Currently     Review of Systems   Unable to perform ROS: Patient nonverbal     Objective:     Vital Signs (Most Recent):  Temp: 98.2 °F (36.8 °C) (02/23/24 1255)  Pulse: 86 (02/23/24 1830)  Resp: 17 (02/23/24 1830)  BP: (!) 108/58 (02/23/24 1830)  SpO2: 100 % (02/23/24 1830) Vital Signs (24h Range):  Temp:  [98.2 °F (36.8 °C)] 98.2 °F (36.8 °C)  Pulse:  [86-93] 86  Resp:  [15-18] 17  SpO2:  [98 %-100 %] 100 %  BP: (108-152)/(57-67) 108/58     Weight: 61.7 kg (136 lb)  Body mass index is 27.47 kg/m².     Physical Exam  Vitals and nursing note reviewed.   Constitutional:       General: She is not in acute distress.     Appearance: Normal appearance.   HENT:      Right Ear: External ear normal.      Left Ear: External ear normal.      Nose: Nose normal.      Mouth/Throat:      Mouth: Mucous membranes are dry.   Cardiovascular:      Rate and Rhythm: Normal rate.   Pulmonary:      Effort: Pulmonary effort is normal.   Abdominal:      Palpations: Abdomen is soft.   Musculoskeletal:      Right lower leg: No edema.      Left lower leg: No edema.   Skin:     General: Skin is warm.   Neurological:      Mental Status: Mental status is at baseline.                Significant Labs: All pertinent labs within the past 24  hours have been reviewed.  CBC:   Recent Labs   Lab 02/23/24  1411   WBC 22.38*   HGB 7.7*   HCT 24.8*   *     CMP:   Recent Labs   Lab 02/23/24  1411   *   K 4.6      CO2 29   *   BUN 61*   CREATININE 1.4   CALCIUM 9.7   PROT 8.7*   ALBUMIN 3.4*   BILITOT 0.4   ALKPHOS 98   AST 16   ALT 17   ANIONGAP 9       Significant Imaging: I have reviewed all pertinent imaging results/findings within the past 24 hours.    Assessment/Plan:     * Acute on chronic anemia  Need to r/o GI bleed  Transfuse as needed  Consulted GI  Iv PPI    Frequent hospital admissions  Ongoing issue  Ideally should be under hospice care       Seizure disorder  Maintain keppra PO      PEG (percutaneous endoscopic gastrostomy) status  Aware f.         UTI (urinary tract infection)  Urine culture and started on abx       Chronic indwelling Mathur catheter  Aware       Bedridden  Aware       Leucocytosis  WBC higher than baseline  Started on iv abx for suspected UTI   Latest Reference Range & Units 12/14/23 05:00 12/15/23 05:51 12/16/23 05:07 12/17/23 04:57 12/18/23 05:03 12/19/23 04:09 02/23/24 14:11   WBC 3.90 - 12.70 K/uL 18.11 (H) 18.90 (H) 17.53 (H) 19.72 (H) 17.48 (H) 15.27 (H) 22.38 (H)   (H): Data is abnormally high      Pressure injury of sacral region, stage 4  Chronic issue  Wound care team consulted       Thrombocytosis  Pt has ? Myeloproliferative disorder  Details unknown  She is on NOAC which will be put on hold in view with anemia/GI bleed      Impaired functional mobility, balance, gait, and endurance  Aware       Cognitive communication deficit  Aware       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  Creatine stable for now. BMP reviewed- noted Estimated Creatinine Clearance: 33.7 mL/min (based on SCr of 1.4 mg/dL). according to latest data. Based on current GFR, CKD stage is stage 3 - GFR 30-59.  Monitor UOP and serial BMP and adjust therapy as needed. Renally dose meds. Avoid nephrotoxic medications and  "procedures.    Type 2 diabetes mellitus with both eyes affected by moderate nonproliferative retinopathy and macular edema, with long-term current use of insulin  Patient's FSGs are controlled on current medication regimen.  Last A1c reviewed-   Lab Results   Component Value Date    HGBA1C 7.8 (H) 12/05/2023     Most recent fingerstick glucose reviewed- No results for input(s): "POCTGLUCOSE" in the last 24 hours.  Current correctional scale  Medium  Maintain anti-hyperglycemic dose as follows-   Antihyperglycemics (From admission, onward)      Start     Stop Route Frequency Ordered    02/24/24 0900  insulin detemir U-100 (Levemir) pen 10 Units         -- SubQ Daily 02/23/24 1532    02/23/24 1631  insulin aspart U-100 pen 0-10 Units         -- SubQ Before meals & nightly PRN 02/23/24 1532          Hold Oral hypoglycemics while patient is in the hospital.      VTE Risk Mitigation (From admission, onward)           Ordered     IP VTE HIGH RISK PATIENT  Once         02/23/24 1532                       On 02/23/2024, patient should be placed in hospital observation services under my care.             Roger Berry MD  Department of Hospital Medicine  [facility] - Emergency Dept          "

## 2024-02-23 NOTE — ED PROVIDER NOTES
Encounter Date: 2/23/2024       History     Chief Complaint   Patient presents with    Weakness     Pt sent from [facility] with abnormal labs. EMS stated that pts H&H is low. Pt is non verbal and is at baseline.     Patient presents complaining of weakness.  Patient had low hemoglobin at [facility].  Patient has had similar presentations for the same.  At baseline patient has history of severe CVA with aphasia, upper and lower extremity weakness, bed-bound, cognitive impairment, hypertension, hyperlipidemia, type 2 diabetes, COPD.  Patient has chronic sacral wound.  Further history is not possible.  Patient was aphasic.      Review of patient's allergies indicates:  No Known Allergies  Past Medical History:   Diagnosis Date    Arthritis     Complete tear of left rotator cuff 11/09/2017    COPD (chronic obstructive pulmonary disease)     COVID-19 infection 07/02/2021    Diabetes mellitus     H/o Cerebrovascular accident (CVA) of left pontine structure 12/12/2019    Hypertension     Personal history of colonic polyps     Stroke     Wears glasses      Past Surgical History:   Procedure Laterality Date    COLONOSCOPY N/A 4/11/2017    Procedure: COLONOSCOPY;  Surgeon: Jared Antonio MD;  Location: [facility];  Service: Endoscopy;  Laterality: N/A;    CYSTOSCOPY W/ RETROGRADES N/A 12/14/2023    Procedure: CYSTOSCOPY, WITH RETROGRADE PYELOGRAM;  Surgeon: Sergio Soria MD;  Location: [facility];  Service: Urology;  Laterality: N/A;    ECHOCARDIOGRAM,TRANSESOPHAGEAL N/A 12/12/2023    Procedure: Transesophageal echo (GT) intra-procedure log documentation;  Surgeon: Antoine Rivera MD;  Location: [facility] CATH/EP LAB;  Service: Cardiology;  Laterality: N/A;    HYSTERECTOMY      INSERTION OF CATHETER N/A 12/14/2023    Procedure: INSERTION, CATHETER;  Surgeon: Sergio Soria MD;  Location: [facility] OR;  Service: Urology;  Laterality: N/A;    OOPHORECTOMY      SHOULDER SURGERY      R    TRANSESOPHAGEAL  ECHOCARDIOGRAM WITH POSSIBLE CARDIOVERSION (GT W/ POSS CARDIOVERSION) N/A 5/4/2023    Procedure: Transesophageal echo (GT) intra-procedure log documentation;  Surgeon: Blade Phillip MD;  Location: [facility] CATH/EP LAB;  Service: Cardiology;  Laterality: N/A;     Family History   Problem Relation Age of Onset    Diabetes Mother     Asthma Mother     Hypertension Mother     Diabetes Father     Diabetes Brother     Hypertension Brother     Anemia Daughter     Glaucoma Neg Hx     Macular degeneration Neg Hx     Retinal detachment Neg Hx      Social History     Tobacco Use    Smoking status: Never    Smokeless tobacco: Never   Substance Use Topics    Alcohol use: No    Drug use: No     Review of Systems   Unable to perform ROS: Patient nonverbal       Physical Exam     Initial Vitals [02/23/24 1255]   BP Pulse Resp Temp SpO2   131/60 93 18 98.2 °F (36.8 °C) 98 %      MAP       --         Physical Exam    Nursing note and vitals reviewed.  Constitutional:   Patient appears weak and frail   HENT:   Head: Normocephalic and atraumatic.   Eyes: EOM are normal.   Neck: Neck supple.   Normal range of motion.  Cardiovascular:  Normal rate, regular rhythm, normal heart sounds and intact distal pulses.           Pulmonary/Chest: No respiratory distress.   Abdominal: Abdomen is soft.   Musculoskeletal:      Cervical back: Normal range of motion and neck supple.      Comments: There indeed is a sacral wound.  There is absence of surrounding erythema warmth or cellulitis.    Stool indeed is brown.  There is no melena.  There is no black stool.     Neurological: She is alert and oriented to person, place, and time.   Skin: Skin is warm and dry. Capillary refill takes less than 2 seconds.   Psychiatric: She has a normal mood and affect. Her behavior is normal. Judgment and thought content normal.         ED Course   Procedures  Labs Reviewed   URINALYSIS, REFLEX TO URINE CULTURE - Abnormal; Notable for the following components:        Result Value    Appearance, UA Hazy (*)     Protein, UA 2+ (*)     Occult Blood UA 2+ (*)     Leukocytes, UA 2+ (*)     All other components within normal limits    Narrative:     Specimen Source->Urine   OCCULT BLOOD X 1, STOOL - Abnormal; Notable for the following components:    Occult Blood Positive (*)     All other components within normal limits   CBC W/ AUTO DIFFERENTIAL - Abnormal; Notable for the following components:    WBC 22.38 (*)     RBC 3.09 (*)     Hemoglobin 7.7 (*)     Hematocrit 24.8 (*)     MCV 80 (*)     MCH 24.9 (*)     MCHC 31.0 (*)     RDW 21.5 (*)     Platelets 847 (*)     Immature Granulocytes 1.9 (*)     Gran # (ANC) 17.2 (*)     Immature Grans (Abs) 0.43 (*)     Mono # 2.4 (*)     Gran % 77.0 (*)     Lymph % 9.3 (*)     Platelet Estimate Increased (*)     All other components within normal limits   COMPREHENSIVE METABOLIC PANEL - Abnormal; Notable for the following components:    Sodium 147 (*)     Glucose 149 (*)     BUN 61 (*)     Total Protein 8.7 (*)     Albumin 3.4 (*)     eGFR 41.5 (*)     All other components within normal limits   URINALYSIS MICROSCOPIC - Abnormal; Notable for the following components:    RBC, UA 93 (*)     WBC, UA 47 (*)     Non-Squam Epith 1 (*)     All other components within normal limits    Narrative:     Specimen Source->Urine   POCT GLUCOSE - Abnormal; Notable for the following components:    POC Glucose 159 (*)     All other components within normal limits   CULTURE, BLOOD   CULTURE, BLOOD   CULTURE, URINE   MAGNESIUM   B-TYPE NATRIURETIC PEPTIDE   LACTIC ACID, PLASMA   LACTIC ACID, PLASMA   TYPE & SCREEN   POCT GLUCOSE MONITORING CONTINUOUS        ECG Results              EKG 12-lead (In process)        Collection Time Result Time QRS Duration OHS QTC Calculation    02/23/24 14:49:44 02/23/24 14:52:58 66 464                     In process by Interface, Lab In [facility] (02/23/24 14:53:04)                   Narrative:    Test Reason : R07.9,    Vent. Rate :  091 BPM     Atrial Rate : 091 BPM     P-R Int : 128 ms          QRS Dur : 066 ms      QT Int : 378 ms       P-R-T Axes : 061 008 063 degrees     QTc Int : 464 ms    Normal sinus rhythm  Inferior infarct ,age undetermined  Abnormal ECG  When compared with ECG of 11-DEC-2023 16:19,  No significant change was found    Referred By: [email or web address]   SELF           Confirmed By:                                   Imaging Results              X-Ray Chest AP Portable (Final result)  Result time 02/23/24 14:47:18      Final result by Heri Stanford MD (02/23/24 14:47:18)                   Narrative:    Chest single view    CLINICAL DATA: Chest pain    FINDINGS: Comparison to December 2023.    Heart size is normal. The mediastinum is unremarkable. No infiltrates or effusions are identified. There is linear scarring in the left midlung, unchanged. Osseous structures are unremarkable.    IMPRESSION:  1. No acute process were detrimental change compared to December 5.  2. Linear scarring in the left midlung.    Electronically signed by:  Heri Stanford MD  02/23/2024 02:47 PM [facility] Workstation: [id number]                                     Medications   baclofen tablet 5 mg (has no administration in time range)   insulin detemir U-100 (Levemir) pen 10 Units (has no administration in time range)   levetiracetam 500 mg/5 mL (5 mL) liquid Soln 500 mg (has no administration in time range)   magnesium oxide tablet 400 mg (has no administration in time range)   metoprolol tartrate (LOPRESSOR) tablet 25 mg (has no administration in time range)   polyethylene glycol packet 17 g (has no administration in time range)   ondansetron injection 4 mg (has no administration in time range)   acetaminophen tablet 650 mg (has no administration in time range)   aluminum-magnesium hydroxide-simethicone 200-200-20 mg/5 mL suspension 30 mL (has no administration in time range)   acetaminophen tablet 650 mg (has no administration in time range)    potassium bicarbonate disintegrating tablet 50 mEq (has no administration in time range)   potassium bicarbonate disintegrating tablet 35 mEq (has no administration in time range)   potassium bicarbonate disintegrating tablet 60 mEq (has no administration in time range)   magnesium oxide tablet 800 mg (has no administration in time range)   magnesium oxide tablet 800 mg (has no administration in time range)   potassium, sodium phosphates 280-160-250 mg packet 2 packet (has no administration in time range)   potassium, sodium phosphates 280-160-250 mg packet 2 packet (has no administration in time range)   potassium, sodium phosphates 280-160-250 mg packet 2 packet (has no administration in time range)   glucose chewable tablet 16 g (has no administration in time range)   glucose chewable tablet 24 g (has no administration in time range)   dextrose 50% injection 12.5 g (has no administration in time range)   dextrose 50% injection 25 g (has no administration in time range)   glucagon (human recombinant) injection 1 mg (has no administration in time range)   insulin aspart U-100 pen 0-10 Units (has no administration in time range)   piperacillin-tazobactam 3.375 g in dextrose 5 % 100 mL IVPB (ready to mix) (has no administration in time range)   pantoprazole injection 40 mg (has no administration in time range)   pantoprazole injection 80 mg (80 mg Intravenous Given 2/23/24 5175)   0.9%  NaCl infusion (1,000 mLs Intravenous New Bag 2/23/24 0146)     Medical Decision Making  Patient is in no acute distress    Considerations include but are not limited to sepsis, acute kidney injury, dehydration, electrolyte abnormalities, GI bleed, UTI, pneumonia    MDM    Patient presents for emergent evaluation of acute low hemoglobin that poses a possible threat to life and/or bodily function.    In the ED patient found to have acute hemoglobin of 7, white blood cell count 20405, urinary tract infection, sepsis.   I ordered labs  and personally reviewed them.  Labs significant for white blood cell count 84468, normal lactate.    I ordered X-rays and personally reviewed them and reviewed the radiologist interpretation.  Xray significant for no acute cardiopulmonary process.    I ordered EKG and personally reviewed it.  EKG significant for regular rate rhythm without ST elevation.      I did review prior medical records.    Admission MDM  I discussed the patient presentation labs, ekg, X-rays, CT findings with the consultant(s) hospitalist   Patient was managed in the ED with IV cefepime, normal saline.    The response to treatment was improved.    Patient required emergent consultation to hospitalist for admission.    Amount and/or Complexity of Data Reviewed  Labs: ordered. Decision-making details documented in ED Course.  Radiology: ordered.               ED Course as of 02/23/24 1608   Fri Feb 23, 2024   1518 WBC(!): 22.38 [AP]   1518 Hemoglobin(!): 7.7 [AP]   1518 Hematocrit(!): 24.8 [AP]   1518 Platelet Count(!): 847 [AP]   1518 Sodium(!): 147 [AP]   1518 Potassium: 4.6 [AP]   1518 Chloride: 109 [AP]   1518 CO2: 29 [AP]   1518 Glucose(!): 149 [AP]   1518 BUN(!): 61 [AP]   1518 Creatinine: 1.4 [AP]   1518 PROTEIN TOTAL(!): 8.7 [AP]   1518 Calcium: 9.7 [AP]   1518 Albumin(!): 3.4 [AP]   1518 BILIRUBIN TOTAL: 0.4 [AP]   1518 ALP: 98 [AP]   1518 AST: 16 [AP]   1518 ALT: 17 [AP]   1518 eGFR(!): 41.5 [AP]   1518 Anion Gap: 9 [AP]      ED Course User Index  [AP] Jose A Fox MD                           Clinical Impression:  Final diagnoses:  [A41.9] Sepsis, due to unspecified organism, unspecified whether acute organ dysfunction present (Primary)  [N39.0] Urinary tract infection without hematuria, site unspecified  [D64.9] Acute on chronic anemia          ED Disposition Condition    Observation                 Jose A Fox MD  02/23/24 1608

## 2024-02-24 LAB
ALBUMIN SERPL BCP-MCNC: 2.8 G/DL (ref 3.5–5.2)
ALP SERPL-CCNC: 70 U/L (ref 55–135)
ALT SERPL W/O P-5'-P-CCNC: 12 U/L (ref 10–44)
ANION GAP SERPL CALC-SCNC: 8 MMOL/L (ref 8–16)
AST SERPL-CCNC: 13 U/L (ref 10–40)
BASOPHILS # BLD AUTO: 0.09 K/UL (ref 0–0.2)
BASOPHILS NFR BLD: 0.5 % (ref 0–1.9)
BILIRUB SERPL-MCNC: 0.4 MG/DL (ref 0.1–1)
BLD PROD TYP BPU: NORMAL
BLOOD UNIT EXPIRATION DATE: NORMAL
BLOOD UNIT TYPE CODE: 9500
BLOOD UNIT TYPE: NORMAL
BUN SERPL-MCNC: 45 MG/DL (ref 8–23)
CALCIUM SERPL-MCNC: 8.8 MG/DL (ref 8.7–10.5)
CHLORIDE SERPL-SCNC: 115 MMOL/L (ref 95–110)
CO2 SERPL-SCNC: 25 MMOL/L (ref 23–29)
CODING SYSTEM: NORMAL
CREAT SERPL-MCNC: 1.1 MG/DL (ref 0.5–1.4)
CROSSMATCH INTERPRETATION: NORMAL
DIFFERENTIAL METHOD BLD: ABNORMAL
DISPENSE STATUS: NORMAL
EOSINOPHIL # BLD AUTO: 0.2 K/UL (ref 0–0.5)
EOSINOPHIL NFR BLD: 1.3 % (ref 0–8)
ERYTHROCYTE [DISTWIDTH] IN BLOOD BY AUTOMATED COUNT: 21.7 % (ref 11.5–14.5)
EST. GFR  (NO RACE VARIABLE): 55.4 ML/MIN/1.73 M^2
GLUCOSE SERPL-MCNC: 100 MG/DL (ref 70–110)
GLUCOSE SERPL-MCNC: 102 MG/DL (ref 70–110)
GLUCOSE SERPL-MCNC: 106 MG/DL (ref 70–110)
GLUCOSE SERPL-MCNC: 117 MG/DL (ref 70–110)
GLUCOSE SERPL-MCNC: 120 MG/DL (ref 70–110)
HCT VFR BLD AUTO: 19.9 % (ref 37–48.5)
HCT VFR BLD AUTO: 20.7 % (ref 37–48.5)
HCT VFR BLD AUTO: 26.5 % (ref 37–48.5)
HGB BLD-MCNC: 6 G/DL (ref 12–16)
HGB BLD-MCNC: 6.2 G/DL (ref 12–16)
HGB BLD-MCNC: 8.3 G/DL (ref 12–16)
IMM GRANULOCYTES # BLD AUTO: 0.32 K/UL (ref 0–0.04)
IMM GRANULOCYTES NFR BLD AUTO: 1.8 % (ref 0–0.5)
LYMPHOCYTES # BLD AUTO: 2.4 K/UL (ref 1–4.8)
LYMPHOCYTES NFR BLD: 13.5 % (ref 18–48)
MAGNESIUM SERPL-MCNC: 2 MG/DL (ref 1.6–2.6)
MCH RBC QN AUTO: 23.9 PG (ref 27–31)
MCHC RBC AUTO-ENTMCNC: 30 G/DL (ref 32–36)
MCV RBC AUTO: 80 FL (ref 82–98)
MONOCYTES # BLD AUTO: 3 K/UL (ref 0.3–1)
MONOCYTES NFR BLD: 16.9 % (ref 4–15)
NEUTROPHILS # BLD AUTO: 11.9 K/UL (ref 1.8–7.7)
NEUTROPHILS NFR BLD: 66 % (ref 38–73)
NRBC BLD-RTO: 0 /100 WBC
NUM UNITS TRANS PACKED RBC: NORMAL
OHS QRS DURATION: 66 MS
OHS QTC CALCULATION: 464 MS
PLATELET # BLD AUTO: 624 K/UL (ref 150–450)
PMV BLD AUTO: 10.5 FL (ref 9.2–12.9)
POTASSIUM SERPL-SCNC: 4 MMOL/L (ref 3.5–5.1)
PROCALCITONIN SERPL IA-MCNC: 0.29 NG/ML (ref 0–0.5)
PROT SERPL-MCNC: 7.2 G/DL (ref 6–8.4)
RBC # BLD AUTO: 2.59 M/UL (ref 4–5.4)
SODIUM SERPL-SCNC: 148 MMOL/L (ref 136–145)
WBC # BLD AUTO: 17.98 K/UL (ref 3.9–12.7)

## 2024-02-24 PROCEDURE — P9016 RBC LEUKOCYTES REDUCED: HCPCS | Performed by: NURSE PRACTITIONER

## 2024-02-24 PROCEDURE — 85014 HEMATOCRIT: CPT | Mod: 91 | Performed by: NURSE PRACTITIONER

## 2024-02-24 PROCEDURE — 84145 PROCALCITONIN (PCT): CPT | Performed by: NURSE PRACTITIONER

## 2024-02-24 PROCEDURE — 36430 TRANSFUSION BLD/BLD COMPNT: CPT

## 2024-02-24 PROCEDURE — 21400001 HC TELEMETRY ROOM

## 2024-02-24 PROCEDURE — 96366 THER/PROPH/DIAG IV INF ADDON: CPT

## 2024-02-24 PROCEDURE — 99233 SBSQ HOSP IP/OBS HIGH 50: CPT | Mod: ,,, | Performed by: INTERNAL MEDICINE

## 2024-02-24 PROCEDURE — 85018 HEMOGLOBIN: CPT | Performed by: NURSE PRACTITIONER

## 2024-02-24 PROCEDURE — 82962 GLUCOSE BLOOD TEST: CPT

## 2024-02-24 PROCEDURE — 25000003 PHARM REV CODE 250: Performed by: INTERNAL MEDICINE

## 2024-02-24 PROCEDURE — 85025 COMPLETE CBC W/AUTO DIFF WBC: CPT | Performed by: INTERNAL MEDICINE

## 2024-02-24 PROCEDURE — C9113 INJ PANTOPRAZOLE SODIUM, VIA: HCPCS | Performed by: INTERNAL MEDICINE

## 2024-02-24 PROCEDURE — 63600175 PHARM REV CODE 636 W HCPCS: Performed by: INTERNAL MEDICINE

## 2024-02-24 PROCEDURE — 83735 ASSAY OF MAGNESIUM: CPT | Performed by: INTERNAL MEDICINE

## 2024-02-24 PROCEDURE — 36415 COLL VENOUS BLD VENIPUNCTURE: CPT | Performed by: INTERNAL MEDICINE

## 2024-02-24 PROCEDURE — 80053 COMPREHEN METABOLIC PANEL: CPT | Performed by: INTERNAL MEDICINE

## 2024-02-24 PROCEDURE — 36415 COLL VENOUS BLD VENIPUNCTURE: CPT | Performed by: NURSE PRACTITIONER

## 2024-02-24 PROCEDURE — 86920 COMPATIBILITY TEST SPIN: CPT | Performed by: NURSE PRACTITIONER

## 2024-02-24 PROCEDURE — 96376 TX/PRO/DX INJ SAME DRUG ADON: CPT

## 2024-02-24 RX ORDER — ACETAMINOPHEN 500 MG
1000 TABLET ORAL EVERY 8 HOURS PRN
Status: DISCONTINUED | OUTPATIENT
Start: 2024-02-24 | End: 2024-02-29 | Stop reason: HOSPADM

## 2024-02-24 RX ORDER — HYDROCODONE BITARTRATE AND ACETAMINOPHEN 500; 5 MG/1; MG/1
TABLET ORAL
Status: DISCONTINUED | OUTPATIENT
Start: 2024-02-24 | End: 2024-02-29 | Stop reason: HOSPADM

## 2024-02-24 RX ORDER — MUPIROCIN 20 MG/G
OINTMENT TOPICAL 2 TIMES DAILY
Status: DISCONTINUED | OUTPATIENT
Start: 2024-02-24 | End: 2024-02-29 | Stop reason: HOSPADM

## 2024-02-24 RX ADMIN — PANTOPRAZOLE SODIUM 40 MG: 40 INJECTION, POWDER, LYOPHILIZED, FOR SOLUTION INTRAVENOUS at 08:02

## 2024-02-24 RX ADMIN — PANTOPRAZOLE SODIUM 40 MG: 40 INJECTION, POWDER, LYOPHILIZED, FOR SOLUTION INTRAVENOUS at 10:02

## 2024-02-24 RX ADMIN — METOPROLOL TARTRATE 25 MG: 25 TABLET, FILM COATED ORAL at 08:02

## 2024-02-24 RX ADMIN — Medication 400 MG: at 08:02

## 2024-02-24 RX ADMIN — PIPERACILLIN SODIUM AND TAZOBACTAM SODIUM 3.38 G: 3; .375 INJECTION, POWDER, LYOPHILIZED, FOR SOLUTION INTRAVENOUS at 12:02

## 2024-02-24 RX ADMIN — LEVETIRACETAM 500 MG: 100 SOLUTION ORAL at 09:02

## 2024-02-24 RX ADMIN — METOPROLOL TARTRATE 25 MG: 25 TABLET, FILM COATED ORAL at 09:02

## 2024-02-24 RX ADMIN — PIPERACILLIN SODIUM AND TAZOBACTAM SODIUM 3.38 G: 3; .375 INJECTION, POWDER, LYOPHILIZED, FOR SOLUTION INTRAVENOUS at 08:02

## 2024-02-24 RX ADMIN — MUPIROCIN 1 G: 20 OINTMENT TOPICAL at 09:02

## 2024-02-24 RX ADMIN — POLYETHYLENE GLYCOL 3350 17 G: 17 POWDER, FOR SOLUTION ORAL at 09:02

## 2024-02-24 RX ADMIN — PIPERACILLIN SODIUM AND TAZOBACTAM SODIUM 3.38 G: 3; .375 INJECTION, POWDER, LYOPHILIZED, FOR SOLUTION INTRAVENOUS at 05:02

## 2024-02-24 NOTE — HOSPITAL COURSE
64 y.o. F transferred from nursing facility with complaints of melena.  Patient had positive Hemoccult with initial hemoglobin of 7.7.  In early morning hemoglobin rechecked and found to be 6.2 repeat = 6.0.  Consent received from patient's family feel witnessed nursing + NP over the phone for blood transfusion. Wound care consulted (wounds), Hem/onc (acute on chronic thrombocytosis), GI (OB positive, hg 7.7-->6). Patient was transfused 1 unit PRBC and GI performed EGD with finding of significant for a single angioectasia in the duodenum that was treated with argon plasma coagulation. Recommended resuming Eliquis at prior dose and she was cleared patient from GI standpoint for discharge.  Her hemoglobin hematocrit remained stable. Patient's white count improved on IV antibiotics.  Patient was evaluated by Hematology.  She was not found to be a candidate for any directed therapy however, flow cytometry and JAK2 testing was ordered.  Supportive care was given.  She was restarted on her home Eliquis.  She was medically stable for discharge back to her nursing home.  Patient was seen and evaluated on day of discharge and deemed appropriate.

## 2024-02-24 NOTE — ASSESSMENT & PLAN NOTE
Need to r/o GI bleed - positive stool OB  Transfuse 1 units PRBC - hg 6  GI pathway  Serial H/H  Await Hem/onc opinion - lost to follow up  Iv PPI

## 2024-02-24 NOTE — PROGRESS NOTES
[facility] Medicine  Progress Note    Patient Name: Steff Johnson  MRN: [id number]  Patient Class: IP- Inpatient   Admission Date: 2/23/2024  Length of Stay: 0 days  Attending Physician: Octavio Brady Jr., MD  Primary Care Provider: Cristina Ren MD        Subjective:     Principal Problem:Acute on chronic anemia        HPI:  64 year old pt getting transferred from facility with H/o weakness/ Low H/H and bladimir  Pt is bed bound and aphasic and has History of frequent hospitalizations  Random labs were obtained in facility and pt was found to be anemic  Also reports of dark stools too  Transferred to ER and got admitted  Further History unavailable       Overview/Hospital Course:  64 y.o. F transferred from nursing facility with complaints of melena.  Patient had positive Hemoccult with initial hemoglobin of 7.7.  In early morning hemoglobin rechecked and found to be 6.2 repeat = 6.0.  Consent received from patient's family feel witnessed nursing + NP over the phone for blood transfusion. Wound care consulted (wounds), Hem/onc (acute on chronic thrombocytosis), GI (OB positive, hg 7.7-->6), ID worsening leukocytosis    -1 units packed red blood cells ordered, GI bleed pathway.  Wound care consulted. White count unclear etiology, multifactorial?, wounds vs urine vs blood.  Awaiting urine and blood cultures NGTD - thrombocytosis, consult to hem/onc    INTERVAL HISTORY: non-verbal, contractures, decondition          Past Medical History:   Diagnosis Date    Arthritis     Complete tear of left rotator cuff 11/09/2017    COPD (chronic obstructive pulmonary disease)     COVID-19 infection 07/02/2021    Diabetes mellitus     H/o Cerebrovascular accident (CVA) of left pontine structure 12/12/2019    Hypertension     Personal history of colonic polyps     Stroke     Wears glasses        Past Surgical History:   Procedure Laterality Date    COLONOSCOPY N/A 4/11/2017    Procedure: COLONOSCOPY;   Surgeon: Jared Antonio MD;  Location: [facility] ENDO;  Service: Endoscopy;  Laterality: N/A;    CYSTOSCOPY W/ RETROGRADES N/A 12/14/2023    Procedure: CYSTOSCOPY, WITH RETROGRADE PYELOGRAM;  Surgeon: Sergio Soria MD;  Location: [facility] OR;  Service: Urology;  Laterality: N/A;    ECHOCARDIOGRAM,TRANSESOPHAGEAL N/A 12/12/2023    Procedure: Transesophageal echo (GT) intra-procedure log documentation;  Surgeon: Antoine Rivera MD;  Location: [facility] CATH/EP LAB;  Service: Cardiology;  Laterality: N/A;    HYSTERECTOMY      INSERTION OF CATHETER N/A 12/14/2023    Procedure: INSERTION, CATHETER;  Surgeon: Sergio Soria MD;  Location: [facility] OR;  Service: Urology;  Laterality: N/A;    OOPHORECTOMY      SHOULDER SURGERY      R    TRANSESOPHAGEAL ECHOCARDIOGRAM WITH POSSIBLE CARDIOVERSION (GT W/ POSS CARDIOVERSION) N/A 5/4/2023    Procedure: Transesophageal echo (GT) intra-procedure log documentation;  Surgeon: Blade Phillip MD;  Location: [facility] CATH/EP LAB;  Service: Cardiology;  Laterality: N/A;       Review of patient's allergies indicates:  No Known Allergies    No current facility-administered medications on file prior to encounter.     Current Outpatient Medications on File Prior to Encounter   Medication Sig    allopurinoL (ZYLOPRIM) 100 MG tablet 100 mg by Per G Tube route once daily.    amLODIPine (NORVASC) 10 MG tablet Take 1 tablet (10 mg total) by mouth once daily. (Patient taking differently: 10 mg by Per G Tube route once daily.)    apixaban (ELIQUIS) 2.5 mg Tab 2.5 mg by Per G Tube route 2 (two) times daily.    ascorbic acid, vitamin C, (VITAMIN C) 500 MG tablet 500 mg by Per G Tube route 2 (two) times daily.    aspirin 81 MG Chew Take 1 tablet (81 mg total) by mouth once daily. (Patient taking differently: 81 mg by Per G Tube route once daily.)    atorvastatin (LIPITOR) 80 MG tablet Take 1 tablet (80 mg total) by mouth every evening. (Patient taking differently: 80 mg by Per G Tube route every  evening.)    folic acid (FOLVITE) 1 MG tablet 1 tablet by Per G Tube route every morning.    furosemide (LASIX) 40 MG tablet 40 mg by Per G Tube route once daily.    HUMALOG KWIKPEN INSULIN 100 unit/mL pen Inject 0-20 Units into the skin As instructed (inject 4 times daily per sliding scale). 0 -149 = 0 units  150 - 199 = 4 units  200 - 249 = 8 units  250 - 299 = 12 units  300 - 349 = 16 units  350 - 1000 = 20 units Call MD    insulin (LANTUS SOLOSTAR U-100 INSULIN) glargine 100 units/mL SubQ pen Inject 35 Units into the skin once daily.    insulin lispro 100 unit/mL injection Inject 2 Units into the skin 3 (three) times daily before meals. 07:30  11:30  16:30    latanoprost 0.005 % ophthalmic solution Place 1 drop into both eyes once daily. (Patient taking differently: Place 1 drop into both eyes every evening.)    levETIRAcetam (KEPPRA) 100 mg/mL Soln 500 mg by Per G Tube route 2 (two) times daily.    losartan (COZAAR) 100 MG tablet Take 1 tablet (100 mg total) by mouth once daily. (Patient taking differently: 100 mg by Per G Tube route once daily.)    magnesium oxide (MAG-OX) 400 mg (241.3 mg magnesium) tablet 400 mg by Per G Tube route once daily.    metFORMIN (GLUCOPHAGE) 500 MG tablet 1,000 mg by Per G Tube route 2 (two) times daily with meals.    metoclopramide HCl (REGLAN) 5 mg/5 mL Soln 10 mg by Per G Tube route 3 (three) times daily.    metoprolol tartrate (LOPRESSOR) 25 MG tablet 25 mg by Per G Tube route 2 (two) times daily.    multivitamin (THERAGRAN) per tablet 1 tablet by Per G Tube route once daily.    omeprazole (PRILOSEC) 20 MG capsule Take 20 mg by mouth once daily. Per G-Tube    polyethylene glycol (GLYCOLAX) 17 gram PwPk 17 g by Per G Tube route once daily.    thiamine (VITAMIN B-1) 100 MG tablet 100 mg by Per G Tube route once daily.    zinc sulfate (ZINC-220 ORAL) 1 tablet by PEG Tube route Daily.    baclofen (LIORESAL) 5 mg Tab tablet 5 mg by Per G Tube route every 8 (eight) hours as needed  (muscle spasms).    cloNIDine (CATAPRES) 0.1 MG tablet Take 1 tablet (0.1 mg total) by mouth 3 (three) times daily. (Patient taking differently: Take 0.1 mg by mouth 4 (four) times daily as needed (For systolic over 160).)    tiotropium (SPIRIVA) 18 mcg inhalation capsule Inhale 1 capsule (18 mcg total) into the lungs once daily. Controller    [DISCONTINUED] blood-glucose meter (TRUE METRIX GLUCOSE METER) kit Use as instructed    [DISCONTINUED] lancing device (TRUEDRAW LANCING DEVICE) Misc Inject 1 Device into the skin 2 (two) times daily.     Family History       Problem Relation (Age of Onset)    Anemia Daughter    Asthma Mother    Diabetes Mother, Father, Brother    Hypertension Mother, Brother          Tobacco Use    Smoking status: Never    Smokeless tobacco: Never   Substance and Sexual Activity    Alcohol use: No    Drug use: No    Sexual activity: Not Currently     Review of Systems   Unable to perform ROS: Patient nonverbal   Constitutional:  Positive for activity change and appetite change.   Psychiatric/Behavioral:  Positive for confusion.         Nonverbal     Objective:     Vital Signs (Most Recent):  Temp: 98.7 °F (37.1 °C) (02/24/24 1031)  Pulse: 76 (02/24/24 1031)  Resp: 17 (02/24/24 1031)  BP: 123/63 (02/24/24 1031)  SpO2: 97 % (02/24/24 1031) Vital Signs (24h Range):  Temp:  [98 °F (36.7 °C)-99.1 °F (37.3 °C)] 98.7 °F (37.1 °C)  Pulse:  [74-90] 76  Resp:  [15-18] 17  SpO2:  [97 %-100 %] 97 %  BP: (108-152)/(57-68) 123/63     Weight: 54.2 kg (119 lb 7.8 oz)  Body mass index is 24.13 kg/m².     Physical Exam  Vitals and nursing note reviewed.   Constitutional:       General: She is not in acute distress.     Appearance: Normal appearance.      Comments: Body mass index is 24.13 kg/m².   HENT:      Right Ear: External ear normal.      Left Ear: External ear normal.      Nose: Nose normal.      Mouth/Throat:      Mouth: Mucous membranes are dry.   Cardiovascular:      Rate and Rhythm: Normal rate and  regular rhythm.   Pulmonary:      Effort: Pulmonary effort is normal.   Abdominal:      Palpations: Abdomen is soft.   Musculoskeletal:         General: Deformity present.      Right lower leg: No edema.      Left lower leg: No edema.   Skin:     General: Skin is warm.   Neurological:      Mental Status: Mental status is at baseline.      Comments: nonverbal                                  Significant Labs: All pertinent labs within the past 24 hours have been reviewed.  CBC:   Recent Labs   Lab 02/23/24  1411 02/24/24  0607 02/24/24  0711   WBC 22.38* 17.98*  --    HGB 7.7* 6.2* 6.0*   HCT 24.8* 20.7* 19.9*   * 624*  --        CMP:   Recent Labs   Lab 02/23/24  1411 02/24/24  0607   * 148*   K 4.6 4.0    115*   CO2 29 25   * 106   BUN 61* 45*   CREATININE 1.4 1.1   CALCIUM 9.7 8.8   PROT 8.7* 7.2   ALBUMIN 3.4* 2.8*   BILITOT 0.4 0.4   ALKPHOS 98 70   AST 16 13   ALT 17 12   ANIONGAP 9 8         Significant Imaging: I have reviewed all pertinent imaging results/findings within the past 24 hours.    Assessment/Plan:      * Acute on chronic anemia  Need to r/o GI bleed - positive stool OB  Transfuse 1 units PRBC - hg 6  GI pathway  Serial H/H  Await Hem/onc opinion - lost to follow up  Iv PPI    Leucocytosis  WBC higher than baseline  Started on iv abx for suspected UTI  Wounds vs urine vs blood  Awaiting urine and blood cultures - awaiting wound consult     Latest Reference Range & Units 12/14/23 05:00 12/15/23 05:51 12/16/23 05:07 12/17/23 04:57 12/18/23 05:03 12/19/23 04:09 02/23/24 14:11   WBC 3.90 - 12.70 K/uL 18.11 (H) 18.90 (H) 17.53 (H) 19.72 (H) 17.48 (H) 15.27 (H) 22.38 (H)   (H): Data is abnormally high      Thrombocytosis  Pt has ? Myeloproliferative disorder  Details unknown seen by hem/on inpatient in 12/2023 about 2 months ago - no f/u since  She is on NOAC which will be put on hold in view with anemia/GI bleed  IP consult to Hem/on thrombocytosis    CKD (chronic kidney  disease) stage 2-3  Creatine stable for now. BMP reviewed- noted Estimated Creatinine Clearance: 33.7 mL/min (based on SCr of 1.4 mg/dL). according to latest data. Based on current GFR, CKD stage is stage 3 - GFR 30-59.  Monitor UOP and serial BMP and adjust therapy as needed. Renally dose meds. Avoid nephrotoxic medications and procedures.    Pressure injury of sacral region, stage 4  Chronic issue  Wound care team consulted       Frequent hospital admissions  Ongoing issue  Ideally should be under hospice care       Seizure disorder  Maintain keppra PO      PEG (percutaneous endoscopic gastrostomy) status  Aware f.         UTI (urinary tract infection)  Urine culture and started on abx       Chronic indwelling Mathur catheter  Aware       Bedridden  Aware       Impaired functional mobility, balance, gait, and endurance  Aware       Cognitive communication deficit  Aware       Type 2 diabetes mellitus with both eyes affected by moderate nonproliferative retinopathy and macular edema, with long-term current use of insulin  Last A1c reviewed-   Lab Results   Component Value Date    HGBA1C 7.8 (H) 12/05/2023     Hold home oral hyperglycemics if indicated - while hospitalized will use combined insulin therapy with basal and prandial insulin coverage, POCT glucose checks, hypoglycemic protocol and moderate correction scale - repeat HgA1c if no record within last 90 days.        VTE Risk Mitigation (From admission, onward)           Ordered     Place PAUL hose  Until discontinued         02/24/24 1447     Place sequential compression device  Until discontinued         02/24/24 1447     IP VTE HIGH RISK PATIENT  Once         02/23/24 1532                Hold off on Lovenox - anemia            Discharge Planning   ALEX:      Code Status: Full Code   Is the patient medically ready for discharge?:     Reason for patient still in hospital (select all that apply): Patient unstable, Patient trending condition, Treatment, and  Consult recommendations  Discharge Plan A: New Nursing Home placement - [facility] care facility                Adeline Rosas, BRY, APRN, FNP-C  [facility] Medicine  [facility]  [email or web address]

## 2024-02-24 NOTE — ASSESSMENT & PLAN NOTE
WBC higher than baseline  Started on iv abx for suspected UTI  Wounds vs urine vs blood  Awaiting urine and blood cultures - awaiting wound consult     Latest Reference Range & Units 12/14/23 05:00 12/15/23 05:51 12/16/23 05:07 12/17/23 04:57 12/18/23 05:03 12/19/23 04:09 02/23/24 14:11   WBC 3.90 - 12.70 K/uL 18.11 (H) 18.90 (H) 17.53 (H) 19.72 (H) 17.48 (H) 15.27 (H) 22.38 (H)   (H): Data is abnormally high

## 2024-02-24 NOTE — HPI
64 year old pt getting transferred from facility with H/o weakness/ Low H/H and bladimir  Pt is bed bound and aphasic and has History of frequent hospitalizations  Random labs were obtained in facility and pt was found to be anemic  Also reports of dark stools too  Transferred to ER and got admitted  Further History unavailable

## 2024-02-24 NOTE — PLAN OF CARE
[facility]  Initial Discharge Assessment       Primary Care Provider: Cristina Ren MD    Admission Diagnosis: Acute on chronic anemia [D64.9]  Sepsis, due to unspecified organism, unspecified whether acute organ dysfunction present [A41.9]    Admission Date: 2/23/2024  Expected Discharge Date: TBD  Spoke with patient's daughter at chairside to complete assessments. Patient cannot speak. Per patient's daughter Leonie Johnson, patient is alert and oriented times four. No POA, living will or advance directive. No HD, HH or Coumadin. Patient is bedbound and needs total assistance. Patient's daughter suspects neglect at [facility] and wants to transfer patient to another nursing home upon discharge. Patient's daughter needs assistance with getting POA for patient. SW will follow-up and assist with above issues as needed.    Transition of Care Barriers: Mobility, Other (see comments) (cannot speak)    Payor: MEDICARE / Plan: MEDICARE PART A & B / Product Type: Government /     Extended Emergency Contact Information  Primary Emergency Contact: Leonie Johnson  Mobile Phone: [phone]  Relation: Daughter  Preferred language: English   needed? No  Secondary Emergency Contact: FigueroaNish  [place] Phone: [phone]  Mobile Phone: [phone]  Relation: Friend   needed? No    Discharge Plan A: New Nursing Home placement - half-way care facility  Discharge Plan B: New Nursing Home placement - half-way care facility      [facility] - [facility] [address]  [address] [address]  Phone: [phone] Fax: [phone]    [facility]  [address]  [place] [facility] [address]  Phone: [phone] Fax: [phone]    [id number] - [place], [place] - [address] & [facility]  [address] [facility] [address]  Phone: [phone] Fax: [phone]    [facility]  [facility] - [address]  [address]  [address]  [place] [address]  Phone: [phone] Fax: [phone]      Initial Assessment (most recent)       Adult Discharge Assessment - 02/24/24 1429          Discharge Assessment    Assessment Type Discharge Planning Assessment     Confirmed/corrected address, phone number and insurance Yes     Source of Information family     When was your last doctors appointment? --   resides at [facility]    Does patient/caregiver understand observation status No     Was observation education provided? Yes     Communicated ALEX with patient/caregiver Date not available/Unable to determine     Reason For Admission acute on chronic anemia     People in Home facility resident     Facility Arrived From: [facility]     Do you expect to return to your current living situation? No     Do you have help at home or someone to help you manage your care at home? Yes     Who are your caregiver(s) and their phone number(s)? nursing home staff     Prior to hospitilization cognitive status: Unable to Assess     Current cognitive status: Unable to Assess     Walking or Climbing Stairs Difficulty yes     Walking or Climbing Stairs stair climbing difficulty, dependent;ambulation difficulty, dependent;transferring difficulty, requires equipment     Mobility Management Patient is bedbound. The nursing home staff assists.     Equipment Currently Used at Home oxygen   uses 2 liters per day. Unsure of supplier, was supplied by [facility] for the past 2 weeks.    Readmission within 30 days? No     Patient currently being followed by outpatient case management? No     Do you currently have service(s) that help you manage your care at home? No     Do you take prescription medications? --   Patient's daughter is not sure if nursing home gives her maedications to her.    Do you have prescription coverage? Yes     Coverage Medicaid     Do you have any problems affording any of your  prescribed medications? No     Is the patient taking medications as prescribed? --   Patient's daughter is unsure as to whether nursing home administers her meds to her.    Who is going to help you get home at discharge? nursing home transportation     How do you get to doctors appointments? other (see comments)   nursing home transports patient.    Are you on dialysis? No     Do you take coumadin? No     Discharge Plan A New Nursing Home placement - shelter care facility     Discharge Plan B New Nursing Home placement - shelter care facility     Discharge Plan discussed with: Adult children     Transition of Care Barriers Mobility;Other (see comments)   cannot speak

## 2024-02-24 NOTE — ASSESSMENT & PLAN NOTE
Last A1c reviewed-   Lab Results   Component Value Date    HGBA1C 7.8 (H) 12/05/2023     Hold home oral hyperglycemics if indicated - while hospitalized will use combined insulin therapy with basal and prandial insulin coverage, POCT glucose checks, hypoglycemic protocol and moderate correction scale - repeat HgA1c if no record within last 90 days.

## 2024-02-24 NOTE — CONSULTS
[facility]   Hematology/Oncology  Inpatient Consult Note          Patient Name: Steff Johnson  MRN: [id number]  Admission Date: 2/23/2024  Hospital Length of Stay: 1 days  Code Status: Full Code   Attending Provider: Octavio Brady Jr., MD  Referring Provider: Jose Antonio  Consulting Provider: Tom Duncan MD  Primary Care Physician: Cristina Ren MD  Principal Problem:Acute on chronic anemia      Coverage for Dr Tony Virk (and Dr PARUL Sylvester)               Consults  Subjective:     Chief Complaint: Weakness (Pt sent from [facility] with abnormal labs. EMS stated that pts H&H is low. Pt is non verbal and is at baseline.)          History Present Illness:  64 y.o. female known to the hematology/oncology service of my associates Dr Tony Virk and Dr PARUL Sylvester with diagnosis of chronic anemia and thrombocytosis. She presented to the ED at [facility] from [facility] with weakness/fatigue and severe anemia. She was last seen by Dr Virk in Dec 2023 while inpatient at [facility]. She has history of prior CVA with subsequent aphasia and is nonverbal except for some minor simple responses to questions. She was previously admitted in Oct 2023 and again in Dec 2023 with urosepticemia from an indwelling catheter. Patient is currently down in endoscopy getting prepped for a scope. I discussed w/ Dr Blum with anesthesiology in person and her floor nursing and endoscopy nursing staff. No family currently at bedside or endoscopy waiting area.             Past Medical/Surgical History:  Past Medical History:   Diagnosis Date    Arthritis     Complete tear of left rotator cuff 11/09/2017    COPD (chronic obstructive pulmonary disease)     COVID-19 infection 07/02/2021    Diabetes mellitus     H/o Cerebrovascular accident (CVA) of left pontine structure 12/12/2019    Hypertension     Personal history of colonic polyps     Stroke     Wears glasses      Past Surgical History:    Procedure Laterality Date    COLONOSCOPY N/A 4/11/2017    Procedure: COLONOSCOPY;  Surgeon: Jared Antonio MD;  Location: [facility] ENDO;  Service: Endoscopy;  Laterality: N/A;    CYSTOSCOPY W/ RETROGRADES N/A 12/14/2023    Procedure: CYSTOSCOPY, WITH RETROGRADE PYELOGRAM;  Surgeon: Sergio Soria MD;  Location: [facility] OR;  Service: Urology;  Laterality: N/A;    ECHOCARDIOGRAM,TRANSESOPHAGEAL N/A 12/12/2023    Procedure: Transesophageal echo (GT) intra-procedure log documentation;  Surgeon: Antoine Rivera MD;  Location: [facility] CATH/EP LAB;  Service: Cardiology;  Laterality: N/A;    HYSTERECTOMY      INSERTION OF CATHETER N/A 12/14/2023    Procedure: INSERTION, CATHETER;  Surgeon: Sergio Soria MD;  Location: [facility] OR;  Service: Urology;  Laterality: N/A;    OOPHORECTOMY      SHOULDER SURGERY      R    TRANSESOPHAGEAL ECHOCARDIOGRAM WITH POSSIBLE CARDIOVERSION (GT W/ POSS CARDIOVERSION) N/A 5/4/2023    Procedure: Transesophageal echo (GT) intra-procedure log documentation;  Surgeon: Blade Phillip MD;  Location: [facility] CATH/EP LAB;  Service: Cardiology;  Laterality: N/A;         Allergies:  Review of patient's allergies indicates:  No Known Allergies    Social/Family History:  Social History     Socioeconomic History    Marital status: Single   Occupational History     Comment: disabled due to shoulder problems   Tobacco Use    Smoking status: Never    Smokeless tobacco: Never   Substance and Sexual Activity    Alcohol use: No    Drug use: No    Sexual activity: Not Currently     Social Determinants of Health     Financial Resource Strain: Low Risk  (12/6/2023)    Overall Financial Resource Strain (CARDIA)     Difficulty of Paying Living Expenses: Not hard at all   Food Insecurity: No Food Insecurity (12/6/2023)    Hunger Vital Sign     Worried About Running Out of Food in the Last Year: Never true     Ran Out of Food in the Last Year: Never true   Transportation Needs: No Transportation Needs  (12/6/2023)    PRAPARE - Transportation     Lack of Transportation (Medical): No     Lack of Transportation (Non-Medical): No   Physical Activity: Sufficiently Active (12/6/2023)    Exercise Vital Sign     Days of Exercise per Week: 5 days     Minutes of Exercise per Session: 150+ min   Stress: No Stress Concern Present (12/6/2023)    [employer] - Occupational Stress Questionnaire     Feeling of Stress : Only a little   Social Connections: Moderately Isolated (12/6/2023)    Social Connection and Isolation Panel [NHANES]     Frequency of Communication with Friends and Family: Once a week     Frequency of Social Gatherings with Friends and Family: Once a week     Attends Orthodox Services: More than 4 times per year     Active Member of Clubs or Organizations: No     Attends Club or Organization Meetings: Never     Marital Status: Living with partner   Housing Stability: Low Risk  (12/6/2023)    Housing Stability Vital Sign     Unable to Pay for Housing in the Last Year: No     Number of Places Lived in the Last Year: 2     Unstable Housing in the Last Year: No     Family History   Problem Relation Age of Onset    Diabetes Mother     Asthma Mother     Hypertension Mother     Diabetes Father     Diabetes Brother     Hypertension Brother     Anemia Daughter     Glaucoma Neg Hx     Macular degeneration Neg Hx     Retinal detachment Neg Hx          ROS: limited due to patient being nonverbal    GEN: weakness, fatigue, generalized malaise    :chronic indwelling catheter        Medications:  Continuous Infusions:  Scheduled Meds:   insulin detemir U-100  10 Units Subcutaneous Daily    levetiracetam  500 mg Per G Tube BID    magnesium oxide  400 mg Per G Tube Daily    metoprolol tartrate  25 mg Per G Tube BID    mupirocin   Nasal BID    pantoprazole  40 mg Intravenous BID    piperacillin-tazobactam (Zosyn) IV (PEDS and ADULTS) (extended infusion is not appropriate)  3.375 g Intravenous Q8H     "polyethylene glycol  17 g Per G Tube Daily     PRN Meds:0.9%  NaCl infusion (for blood administration), 0.9%  NaCl infusion (for blood administration), acetaminophen, acetaminophen, aluminum-magnesium hydroxide-simethicone, baclofen, dextrose 50% in water (D50W), dextrose 50% in water (D50W), glucagon (human recombinant), glucose, glucose, insulin aspart U-100, magnesium oxide, magnesium oxide, ondansetron, potassium bicarbonate, potassium bicarbonate, potassium bicarbonate, potassium, sodium phosphates, potassium, sodium phosphates, potassium, sodium phosphates         Objective:       Physical Exam:    Vitals:  Blood pressure 136/78, pulse 71, temperature 99.4 °F (37.4 °C), temperature source Axillary, resp. rate 18, height 4' 11" (1.499 m), weight 54.2 kg (119 lb 7.8 oz), SpO2 97 %.    GEN: no apparent distress, appears comfortable; nonverbal with cognitive communication deficit  HEAD: atraumatic and normocephalic  EYES: no pallor, no icterus, PERRLA  ENT: OMM, no pharyngeal erythema, external ears WNL; no nasal discharge; no thrush  NECK: no masses, thyroid normal, trachea midline, no LAD/LN's, supple  CV: RRR with no murmur; normal pulse; normal S1 and S2; no pedal edema  CHEST: Normal respiratory effort; CTAB; normal breath sounds; no wheeze or crackles  ABDOM: nontender and nondistended; soft; normal bowel sounds; no rebound/guarding  MUSC/Skeletal: ROM normal; no crepitus; joints normal; + deformities with muscle wasting and contractures  EXTREM: no clubbing, cyanosis, inflammation or swelling; IV LUE  SKIN: no rashes, lesions, ulcers, petechiae or subcutaneous nodules; sacral decubitus; dry skin; chronic skin changes  :  no changes  NEURO:  impaired mobility/balance; nonverbal with minimal responses to questions; no tremors  PSYCH: unable to ascertain but does not appear to be anxious  LYMPH: normal cervical, supraclavicular, axillary and groin LN's      Lab Review:   Lab Results   Component Value Date    " WBC 13.60 (H) 02/25/2024    HGB 8.1 (L) 02/25/2024    HCT 25.4 (L) 02/25/2024    MCV 83 02/25/2024     (H) 02/25/2024       Lab Results   Component Value Date    IRON 16 (L) 12/15/2023    TRANSFERRIN 153 (L) 12/15/2023    TIBC 214 (L) 12/15/2023    FESATURATED 7 (L) 12/15/2023      Lab Results   Component Value Date    FERRITIN 886.8 (H) 12/15/2023     Lab Results   Component Value Date    [id number] 638 12/16/2023     Lab Results   Component Value Date    FOLATE 18.4 12/16/2023     CMP  Sodium   Date Value Ref Range Status   02/25/2024 148 (H) 136 - 145 mmol/L Final     Potassium   Date Value Ref Range Status   02/25/2024 3.9 3.5 - 5.1 mmol/L Final     Chloride   Date Value Ref Range Status   02/25/2024 117 (H) 95 - 110 mmol/L Final     CO2   Date Value Ref Range Status   02/25/2024 20 (L) 23 - 29 mmol/L Final     Glucose   Date Value Ref Range Status   02/25/2024 123 (H) 70 - 110 mg/dL Final     BUN   Date Value Ref Range Status   02/25/2024 27 (H) 8 - 23 mg/dL Final     Creatinine   Date Value Ref Range Status   02/25/2024 1.0 0.5 - 1.4 mg/dL Final     Calcium   Date Value Ref Range Status   02/25/2024 8.9 8.7 - 10.5 mg/dL Final     Total Protein   Date Value Ref Range Status   02/25/2024 7.3 6.0 - 8.4 g/dL Final     Albumin   Date Value Ref Range Status   02/25/2024 2.9 (L) 3.5 - 5.2 g/dL Final     Total Bilirubin   Date Value Ref Range Status   02/25/2024 0.4 0.1 - 1.0 mg/dL Final     Comment:     For infants and newborns, interpretation of results should be based  on gestational age, weight and in agreement with clinical  observations.    Premature Infant recommended reference ranges:  Up to 24 hours.............<8.0 mg/dL  Up to 48 hours............<12.0 mg/dL  3-5 days..................<15.0 mg/dL  6-29 days.................<15.0 mg/dL       Alkaline Phosphatase   Date Value Ref Range Status   02/25/2024 80 55 - 135 U/L Final     AST   Date Value Ref Range Status   02/25/2024 14 10 - 40 U/L Final      ALT   Date Value Ref Range Status   02/25/2024 10 10 - 44 U/L Final     Anion Gap   Date Value Ref Range Status   02/25/2024 11 8 - 16 mmol/L Final     eGFR   Date Value Ref Range Status   02/25/2024 >60.0 >60 mL/min/1.73 m^2 Final         Diagnostic Results:  X-Ray Chest AP Portable [[id number]] Collected: 02/23/24 1408   Order Status: Completed Updated: 02/23/24 1449   Narrative:     Chest single view    CLINICAL DATA: Chest pain    FINDINGS: Comparison to December 2023.    Heart size is normal. The mediastinum is unremarkable. No infiltrates or effusions are identified. There is linear scarring in the left midlung, unchanged. Osseous structures are unremarkable.    IMPRESSION:  1. No acute process were detrimental change compared to December 5.  2. Linear scarring in the left midlung.       Assessment/Plan:     IMPRESSION:  (1) 64 y.o. female known to the hematology/oncology service of my associates Dr Tony Virk and Dr PARUL Sylvester with diagnosis of chronic anemia and thrombocytosis. She presented to the ED at [facility] from [facility] with weakness/fatigue, recurrent UTI and severe anemia. She was last seen by Dr Virk in Dec 2023 while inpatient at [facility]. She has history of prior CVA with subsequent aphasia and is nonverbal except for some minor simple responses to questions. She was previously admitted in Oct 2023 and again in Dec 2023 with urosepticemia from an indwelling catheter.     2/24/2024:  Pre-Note only - reviewed chart and labs  - wbc at 17.98  - hgb at 6.0  - plat at 624,000  - see my differential analysis below of the suspected etiologies of the patient's hematopathology  - patient see by Dr May with GI this admit  - recommend monitoring of labs and transfuse as needed - recommend consideration for 1-2 units of blood  - communicated with Adeline Rosas NP via [facility]    2/25/2024:  - wbc 13.6, hgb 8.1 and plats at 637,000  - s/p blood transfusion  - currently in endoscopy suite          (2) Chronic anemia - microcytic indices with low serum iron,  low % iron sats and low TIBC  - multifactorial process with underlying anemia of chronic disease, long-standing chronic iron deficiency, anemia of chronic renal insufficiency +/- chronic GI blood loss  - total bili is completely normal and this effective rules out any underlying significant hemolytic process  - can not effectively or completely rule out an underlying myelodysplastic or myeloproliferative process without first undergoing a bone marrow biopsy (will defer to Dr Virk)     (3) Chronic leucocytosis - possible a reactive process from recurrent infections, UTI's etc    (4) Chronic thrombocytosis - suspect a multifactorial process due to the underlying long standing iron deficiency state, chronic thrombocytosis which can be found in chronic CVA/neuro-deficient or paralytic patients, and chronic inflammation/infection conditions such as chronic UTI's  - of note, JAK2 study in Dec 2023 was negative    (5) COPD    (6) DM type II    (7) Hx/of GT by Dr MIHAI Rivera in Dec 2023 and MAy 2023    (7) Hx/of Left pontine CVA with subsequent neurodeficit with chronic aphasia; chronic seizure disorder; cognitive communication deficit; impaired mobility/balance disorder    (8) HTN    (9) CKD stage II/III    (10) Arthritis, left rotator tear; hx/of right shoulder surgery     (11) Hx/of colon polyps with colonoscopy with Dr Antonio in Apr 2017    (12) Indwelling catheter with chronic UTI's and admits for urosepticemia    (13)  Sacral decubiti stage IV    (14) Hypernatremia          Active Diagnoses:    Diagnosis Date Noted POA    PRINCIPAL PROBLEM:  Acute on chronic anemia [D64.9] 02/23/2024 Yes    UTI (urinary tract infection) [N39.0] 02/23/2024 Yes    PEG (percutaneous endoscopic gastrostomy) status [Z93.1] 02/23/2024 Not Applicable    Seizure disorder [G40.909] 02/23/2024 Yes    Frequent hospital admissions [Z78.9] 02/23/2024 Yes    Bedridden  [Z74.01] 12/07/2023 Not Applicable    Chronic indwelling Mathur catheter [Z97.8] 12/07/2023 Not Applicable    Leucocytosis [D72.829] 12/06/2023 Yes    Pressure injury of sacral region, stage 4 [L89.154] 10/25/2023 Yes    Thrombocytosis [D75.839] 10/25/2023 Yes    Cognitive communication deficit [R41.841] 05/31/2023 Yes    Impaired functional mobility, balance, gait, and endurance [Z74.09] 05/31/2023 Yes    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 [N18.30] 09/26/2020 Yes    Type 2 diabetes mellitus with both eyes affected by moderate nonproliferative retinopathy and macular edema, with long-term current use of insulin [E11.3313, Z79.4] 08/07/2019 Not Applicable      Problems Resolved During this Admission:           PLAN:   recommend monitoring of labs and transfuse as needed    Gastro plans/GIB workup as per GI directives - currently in endoscopy   Will defer consideration for bone marrow biopsy to Dr Virk  Consideration can be made for baby aspirin daily and/or low dose eliquis as long as no GI bleeding source is identified  Will follow with you - Dr Tony Virk returns on Monday and will assume hematologic/oncologic management starting at 8:30am on 2/26           Thank you for your consult.      Tom Duncan MD  Hematology/Oncology  [facility]

## 2024-02-24 NOTE — ASSESSMENT & PLAN NOTE
Pt has ? Myeloproliferative disorder  Details unknown seen by hem/on inpatient in 12/2023 about 2 months ago - no f/u since  She is on NOAC which will be put on hold in view with anemia/GI bleed  IP consult to Hem/on thrombocytosis

## 2024-02-24 NOTE — ASSESSMENT & PLAN NOTE
Creatine stable for now. BMP reviewed- noted Estimated Creatinine Clearance: 33.7 mL/min (based on SCr of 1.4 mg/dL). according to latest data. Based on current GFR, CKD stage is stage 3 - GFR 30-59.  Monitor UOP and serial BMP and adjust therapy as needed. Renally dose meds. Avoid nephrotoxic medications and procedures.

## 2024-02-24 NOTE — ASSESSMENT & PLAN NOTE
Pt has ? Myeloproliferative disorder  Details unknown  She is on NOAC which will be put on hold in view with anemia/GI bleed

## 2024-02-24 NOTE — SUBJECTIVE & OBJECTIVE
INTERVAL HISTORY: non-verbal, contractures, decondition          Past Medical History:   Diagnosis Date    Arthritis     Complete tear of left rotator cuff 11/09/2017    COPD (chronic obstructive pulmonary disease)     COVID-19 infection 07/02/2021    Diabetes mellitus     H/o Cerebrovascular accident (CVA) of left pontine structure 12/12/2019    Hypertension     Personal history of colonic polyps     Stroke     Wears glasses        Past Surgical History:   Procedure Laterality Date    COLONOSCOPY N/A 4/11/2017    Procedure: COLONOSCOPY;  Surgeon: Jared Antonio MD;  Location: [facility] ENDO;  Service: Endoscopy;  Laterality: N/A;    CYSTOSCOPY W/ RETROGRADES N/A 12/14/2023    Procedure: CYSTOSCOPY, WITH RETROGRADE PYELOGRAM;  Surgeon: Sergio Soria MD;  Location: [facility] OR;  Service: Urology;  Laterality: N/A;    ECHOCARDIOGRAM,TRANSESOPHAGEAL N/A 12/12/2023    Procedure: Transesophageal echo (GT) intra-procedure log documentation;  Surgeon: Antoine Rivera MD;  Location: [facility] CATH/EP LAB;  Service: Cardiology;  Laterality: N/A;    HYSTERECTOMY      INSERTION OF CATHETER N/A 12/14/2023    Procedure: INSERTION, CATHETER;  Surgeon: Sergio Soria MD;  Location: [facility] OR;  Service: Urology;  Laterality: N/A;    OOPHORECTOMY      SHOULDER SURGERY      R    TRANSESOPHAGEAL ECHOCARDIOGRAM WITH POSSIBLE CARDIOVERSION (GT W/ POSS CARDIOVERSION) N/A 5/4/2023    Procedure: Transesophageal echo (GT) intra-procedure log documentation;  Surgeon: Blade Phillip MD;  Location: [facility] CATH/EP LAB;  Service: Cardiology;  Laterality: N/A;       Review of patient's allergies indicates:  No Known Allergies    No current facility-administered medications on file prior to encounter.     Current Outpatient Medications on File Prior to Encounter   Medication Sig    allopurinoL (ZYLOPRIM) 100 MG tablet 100 mg by Per G Tube route once daily.    amLODIPine (NORVASC) 10 MG tablet Take 1 tablet (10 mg total) by mouth once daily.  (Patient taking differently: 10 mg by Per G Tube route once daily.)    apixaban (ELIQUIS) 2.5 mg Tab 2.5 mg by Per G Tube route 2 (two) times daily.    ascorbic acid, vitamin C, (VITAMIN C) 500 MG tablet 500 mg by Per G Tube route 2 (two) times daily.    aspirin 81 MG Chew Take 1 tablet (81 mg total) by mouth once daily. (Patient taking differently: 81 mg by Per G Tube route once daily.)    atorvastatin (LIPITOR) 80 MG tablet Take 1 tablet (80 mg total) by mouth every evening. (Patient taking differently: 80 mg by Per G Tube route every evening.)    folic acid (FOLVITE) 1 MG tablet 1 tablet by Per G Tube route every morning.    furosemide (LASIX) 40 MG tablet 40 mg by Per G Tube route once daily.    HUMALOG KWIKPEN INSULIN 100 unit/mL pen Inject 0-20 Units into the skin As instructed (inject 4 times daily per sliding scale). 0 -149 = 0 units  150 - 199 = 4 units  200 - 249 = 8 units  250 - 299 = 12 units  300 - 349 = 16 units  350 - 1000 = 20 units Call MD    insulin (LANTUS SOLOSTAR U-100 INSULIN) glargine 100 units/mL SubQ pen Inject 35 Units into the skin once daily.    insulin lispro 100 unit/mL injection Inject 2 Units into the skin 3 (three) times daily before meals. 07:30  11:30  16:30    latanoprost 0.005 % ophthalmic solution Place 1 drop into both eyes once daily. (Patient taking differently: Place 1 drop into both eyes every evening.)    levETIRAcetam (KEPPRA) 100 mg/mL Soln 500 mg by Per G Tube route 2 (two) times daily.    losartan (COZAAR) 100 MG tablet Take 1 tablet (100 mg total) by mouth once daily. (Patient taking differently: 100 mg by Per G Tube route once daily.)    magnesium oxide (MAG-OX) 400 mg (241.3 mg magnesium) tablet 400 mg by Per G Tube route once daily.    metFORMIN (GLUCOPHAGE) 500 MG tablet 1,000 mg by Per G Tube route 2 (two) times daily with meals.    metoclopramide HCl (REGLAN) 5 mg/5 mL Soln 10 mg by Per G Tube route 3 (three) times daily.    metoprolol tartrate (LOPRESSOR) 25 MG  tablet 25 mg by Per G Tube route 2 (two) times daily.    multivitamin (THERAGRAN) per tablet 1 tablet by Per G Tube route once daily.    omeprazole (PRILOSEC) 20 MG capsule Take 20 mg by mouth once daily. Per G-Tube    polyethylene glycol (GLYCOLAX) 17 gram PwPk 17 g by Per G Tube route once daily.    thiamine (VITAMIN B-1) 100 MG tablet 100 mg by Per G Tube route once daily.    zinc sulfate (ZINC-220 ORAL) 1 tablet by PEG Tube route Daily.    baclofen (LIORESAL) 5 mg Tab tablet 5 mg by Per G Tube route every 8 (eight) hours as needed (muscle spasms).    cloNIDine (CATAPRES) 0.1 MG tablet Take 1 tablet (0.1 mg total) by mouth 3 (three) times daily. (Patient taking differently: Take 0.1 mg by mouth 4 (four) times daily as needed (For systolic over 160).)    tiotropium (SPIRIVA) 18 mcg inhalation capsule Inhale 1 capsule (18 mcg total) into the lungs once daily. Controller    [DISCONTINUED] blood-glucose meter (TRUE METRIX GLUCOSE METER) kit Use as instructed    [DISCONTINUED] lancing device (TRUEDRAW LANCING DEVICE) Misc Inject 1 Device into the skin 2 (two) times daily.     Family History       Problem Relation (Age of Onset)    Anemia Daughter    Asthma Mother    Diabetes Mother, Father, Brother    Hypertension Mother, Brother          Tobacco Use    Smoking status: Never    Smokeless tobacco: Never   Substance and Sexual Activity    Alcohol use: No    Drug use: No    Sexual activity: Not Currently     Review of Systems   Unable to perform ROS: Patient nonverbal   Constitutional:  Positive for activity change and appetite change.   Psychiatric/Behavioral:  Positive for confusion.         Nonverbal     Objective:     Vital Signs (Most Recent):  Temp: 98.7 °F (37.1 °C) (02/24/24 1031)  Pulse: 76 (02/24/24 1031)  Resp: 17 (02/24/24 1031)  BP: 123/63 (02/24/24 1031)  SpO2: 97 % (02/24/24 1031) Vital Signs (24h Range):  Temp:  [98 °F (36.7 °C)-99.1 °F (37.3 °C)] 98.7 °F (37.1 °C)  Pulse:  [74-90] 76  Resp:  [15-18]  17  SpO2:  [97 %-100 %] 97 %  BP: (108-152)/(57-68) 123/63     Weight: 54.2 kg (119 lb 7.8 oz)  Body mass index is 24.13 kg/m².     Physical Exam  Vitals and nursing note reviewed.   Constitutional:       General: She is not in acute distress.     Appearance: Normal appearance.      Comments: Body mass index is 24.13 kg/m².   HENT:      Right Ear: External ear normal.      Left Ear: External ear normal.      Nose: Nose normal.      Mouth/Throat:      Mouth: Mucous membranes are dry.   Cardiovascular:      Rate and Rhythm: Normal rate and regular rhythm.   Pulmonary:      Effort: Pulmonary effort is normal.   Abdominal:      Palpations: Abdomen is soft.   Musculoskeletal:         General: Deformity present.      Right lower leg: No edema.      Left lower leg: No edema.   Skin:     General: Skin is warm.   Neurological:      Mental Status: Mental status is at baseline.      Comments: nonverbal                                  Significant Labs: All pertinent labs within the past 24 hours have been reviewed.  CBC:   Recent Labs   Lab 02/23/24  1411 02/24/24  0607 02/24/24  0711   WBC 22.38* 17.98*  --    HGB 7.7* 6.2* 6.0*   HCT 24.8* 20.7* 19.9*   * 624*  --        CMP:   Recent Labs   Lab 02/23/24  1411 02/24/24  0607   * 148*   K 4.6 4.0    115*   CO2 29 25   * 106   BUN 61* 45*   CREATININE 1.4 1.1   CALCIUM 9.7 8.8   PROT 8.7* 7.2   ALBUMIN 3.4* 2.8*   BILITOT 0.4 0.4   ALKPHOS 98 70   AST 16 13   ALT 17 12   ANIONGAP 9 8         Significant Imaging: I have reviewed all pertinent imaging results/findings within the past 24 hours.

## 2024-02-24 NOTE — NURSING
Nurses Note -- 4 Eyes      2/24/2024   2:04 AM      Skin assessed during: Admit      [] No Altered Skin Integrity Present    []Prevention Measures Documented      [x] Yes- Altered Skin Integrity Present or Discovered   [x] LDA Added if Not in Epic (Describe Wound)   [x] New Altered Skin Integrity was Present on Admit and Documented in LDA   [x] Wound Image Taken    Wound Care Consulted? Yes    Attending Nurse:  Jessica Webb RN/Staff Member: [id number]

## 2024-02-24 NOTE — ASSESSMENT & PLAN NOTE
"Patient's FSGs are controlled on current medication regimen.  Last A1c reviewed-   Lab Results   Component Value Date    HGBA1C 7.8 (H) 12/05/2023     Most recent fingerstick glucose reviewed- No results for input(s): "POCTGLUCOSE" in the last 24 hours.  Current correctional scale  Medium  Maintain anti-hyperglycemic dose as follows-   Antihyperglycemics (From admission, onward)      Start     Stop Route Frequency Ordered    02/24/24 0900  insulin detemir U-100 (Levemir) pen 10 Units         -- SubQ Daily 02/23/24 1532    02/23/24 1631  insulin aspart U-100 pen 0-10 Units         -- SubQ Before meals & nightly PRN 02/23/24 1532          Hold Oral hypoglycemics while patient is in the hospital.  "

## 2024-02-24 NOTE — SUBJECTIVE & OBJECTIVE
Past Medical History:   Diagnosis Date    Arthritis     Complete tear of left rotator cuff 11/09/2017    COPD (chronic obstructive pulmonary disease)     COVID-19 infection 07/02/2021    Diabetes mellitus     H/o Cerebrovascular accident (CVA) of left pontine structure 12/12/2019    Hypertension     Personal history of colonic polyps     Stroke     Wears glasses        Past Surgical History:   Procedure Laterality Date    COLONOSCOPY N/A 4/11/2017    Procedure: COLONOSCOPY;  Surgeon: Jared Antonio MD;  Location: [facility] ENDO;  Service: Endoscopy;  Laterality: N/A;    CYSTOSCOPY W/ RETROGRADES N/A 12/14/2023    Procedure: CYSTOSCOPY, WITH RETROGRADE PYELOGRAM;  Surgeon: Sergio Soria MD;  Location: [facility] OR;  Service: Urology;  Laterality: N/A;    ECHOCARDIOGRAM,TRANSESOPHAGEAL N/A 12/12/2023    Procedure: Transesophageal echo (GT) intra-procedure log documentation;  Surgeon: Antoine Rivera MD;  Location: [facility] CATH/EP LAB;  Service: Cardiology;  Laterality: N/A;    HYSTERECTOMY      INSERTION OF CATHETER N/A 12/14/2023    Procedure: INSERTION, CATHETER;  Surgeon: Sergio Soria MD;  Location: [facility] OR;  Service: Urology;  Laterality: N/A;    OOPHORECTOMY      SHOULDER SURGERY      R    TRANSESOPHAGEAL ECHOCARDIOGRAM WITH POSSIBLE CARDIOVERSION (GT W/ POSS CARDIOVERSION) N/A 5/4/2023    Procedure: Transesophageal echo (GT) intra-procedure log documentation;  Surgeon: Blade Phillip MD;  Location: [facility] CATH/EP LAB;  Service: Cardiology;  Laterality: N/A;       Review of patient's allergies indicates:  No Known Allergies    No current facility-administered medications on file prior to encounter.     Current Outpatient Medications on File Prior to Encounter   Medication Sig    allopurinoL (ZYLOPRIM) 100 MG tablet 100 mg by Per G Tube route once daily.    amLODIPine (NORVASC) 10 MG tablet Take 1 tablet (10 mg total) by mouth once daily. (Patient taking differently: 10 mg by Per G Tube route once  daily.)    apixaban (ELIQUIS) 2.5 mg Tab 2.5 mg by Per G Tube route 2 (two) times daily.    ascorbic acid, vitamin C, (VITAMIN C) 500 MG tablet 500 mg by Per G Tube route 2 (two) times daily.    aspirin 81 MG Chew Take 1 tablet (81 mg total) by mouth once daily. (Patient taking differently: 81 mg by Per G Tube route once daily.)    atorvastatin (LIPITOR) 80 MG tablet Take 1 tablet (80 mg total) by mouth every evening. (Patient taking differently: 80 mg by Per G Tube route every evening.)    folic acid (FOLVITE) 1 MG tablet 1 tablet by Per G Tube route every morning.    furosemide (LASIX) 40 MG tablet 40 mg by Per G Tube route once daily.    HUMALOG KWIKPEN INSULIN 100 unit/mL pen Inject 0-20 Units into the skin As instructed (inject 4 times daily per sliding scale). 0 -149 = 0 units  150 - 199 = 4 units  200 - 249 = 8 units  250 - 299 = 12 units  300 - 349 = 16 units  350 - 1000 = 20 units Call MD    insulin (LANTUS SOLOSTAR U-100 INSULIN) glargine 100 units/mL SubQ pen Inject 35 Units into the skin once daily.    insulin lispro 100 unit/mL injection Inject 2 Units into the skin 3 (three) times daily before meals. 07:30  11:30  16:30    latanoprost 0.005 % ophthalmic solution Place 1 drop into both eyes once daily. (Patient taking differently: Place 1 drop into both eyes every evening.)    levETIRAcetam (KEPPRA) 100 mg/mL Soln 500 mg by Per G Tube route 2 (two) times daily.    losartan (COZAAR) 100 MG tablet Take 1 tablet (100 mg total) by mouth once daily. (Patient taking differently: 100 mg by Per G Tube route once daily.)    magnesium oxide (MAG-OX) 400 mg (241.3 mg magnesium) tablet 400 mg by Per G Tube route once daily.    metFORMIN (GLUCOPHAGE) 500 MG tablet 1,000 mg by Per G Tube route 2 (two) times daily with meals.    metoclopramide HCl (REGLAN) 5 mg/5 mL Soln 10 mg by Per G Tube route 3 (three) times daily.    metoprolol tartrate (LOPRESSOR) 25 MG tablet 25 mg by Per G Tube route 2 (two) times daily.     multivitamin (THERAGRAN) per tablet 1 tablet by Per G Tube route once daily.    omeprazole (PRILOSEC) 20 MG capsule Take 20 mg by mouth once daily. Per G-Tube    polyethylene glycol (GLYCOLAX) 17 gram PwPk 17 g by Per G Tube route once daily.    thiamine (VITAMIN B-1) 100 MG tablet 100 mg by Per G Tube route once daily.    zinc sulfate (ZINC-220 ORAL) 1 tablet by PEG Tube route Daily.    baclofen (LIORESAL) 5 mg Tab tablet 5 mg by Per G Tube route every 8 (eight) hours as needed (muscle spasms).    cloNIDine (CATAPRES) 0.1 MG tablet Take 1 tablet (0.1 mg total) by mouth 3 (three) times daily. (Patient taking differently: Take 0.1 mg by mouth 4 (four) times daily as needed (For systolic over 160).)    tiotropium (SPIRIVA) 18 mcg inhalation capsule Inhale 1 capsule (18 mcg total) into the lungs once daily. Controller    [DISCONTINUED] blood-glucose meter (TRUE METRIX GLUCOSE METER) kit Use as instructed    [DISCONTINUED] insulin aspart U-100 (NOVOLOG) 100 unit/mL injection Inject 4 Units into the skin As instructed for High Blood Sugar (Four times daily per sliding scale). 0 -150 = 0 units  151 - 200 = 4 units  201 - 250 = 8 units  251 - 300 = 10 units  301 - 350 = 12 units  351 - 400 = 16 units  401 - 1000 = 20 units call MD    [DISCONTINUED] lancing device (TRUEDRAW LANCING DEVICE) Misc Inject 1 Device into the skin 2 (two) times daily.     Family History       Problem Relation (Age of Onset)    Anemia Daughter    Asthma Mother    Diabetes Mother, Father, Brother    Hypertension Mother, Brother          Tobacco Use    Smoking status: Never    Smokeless tobacco: Never   Substance and Sexual Activity    Alcohol use: No    Drug use: No    Sexual activity: Not Currently     Review of Systems   Unable to perform ROS: Patient nonverbal     Objective:     Vital Signs (Most Recent):  Temp: 98.2 °F (36.8 °C) (02/23/24 1255)  Pulse: 86 (02/23/24 1830)  Resp: 17 (02/23/24 1830)  BP: (!) 108/58 (02/23/24 1830)  SpO2: 100 %  (02/23/24 1830) Vital Signs (24h Range):  Temp:  [98.2 °F (36.8 °C)] 98.2 °F (36.8 °C)  Pulse:  [86-93] 86  Resp:  [15-18] 17  SpO2:  [98 %-100 %] 100 %  BP: (108-152)/(57-67) 108/58     Weight: 61.7 kg (136 lb)  Body mass index is 27.47 kg/m².     Physical Exam  Vitals and nursing note reviewed.   Constitutional:       General: She is not in acute distress.     Appearance: Normal appearance.   HENT:      Right Ear: External ear normal.      Left Ear: External ear normal.      Nose: Nose normal.      Mouth/Throat:      Mouth: Mucous membranes are dry.   Cardiovascular:      Rate and Rhythm: Normal rate.   Pulmonary:      Effort: Pulmonary effort is normal.   Abdominal:      Palpations: Abdomen is soft.   Musculoskeletal:      Right lower leg: No edema.      Left lower leg: No edema.   Skin:     General: Skin is warm.   Neurological:      Mental Status: Mental status is at baseline.                Significant Labs: All pertinent labs within the past 24 hours have been reviewed.  CBC:   Recent Labs   Lab 02/23/24  1411   WBC 22.38*   HGB 7.7*   HCT 24.8*   *     CMP:   Recent Labs   Lab 02/23/24  1411   *   K 4.6      CO2 29   *   BUN 61*   CREATININE 1.4   CALCIUM 9.7   PROT 8.7*   ALBUMIN 3.4*   BILITOT 0.4   ALKPHOS 98   AST 16   ALT 17   ANIONGAP 9       Significant Imaging: I have reviewed all pertinent imaging results/findings within the past 24 hours.

## 2024-02-24 NOTE — CONSULTS
GASTROENTEROLOGY INPATIENT CONSULT NOTE  Patient Name: Steff Johnson  Patient MRN: [id number]  Patient : 1960    Admit Date: 2024  Service date: 2024    Reason for Consult: anemia    PCP: Cristina Ren MD    Chief Complaint   Patient presents with    Weakness     Pt sent from [facility] with abnormal labs. EMS stated that pts H&H is low. Pt is non verbal and is at baseline.       HPI:   Patient non-verbal so history per chart review    Patient is a 64 y.o. female with PMHx COPD, h/o CVA w/ aphasia, HTN who presents from NH with anemia. Occult blood positive on admission. No bleeding reported.      Past Medical History:  Past Medical History:   Diagnosis Date    Arthritis     Complete tear of left rotator cuff 2017    COPD (chronic obstructive pulmonary disease)     COVID-19 infection 2021    Diabetes mellitus     H/o Cerebrovascular accident (CVA) of left pontine structure 2019    Hypertension     Personal history of colonic polyps     Stroke     Wears glasses         Past Surgical History:  Past Surgical History:   Procedure Laterality Date    COLONOSCOPY N/A 2017    Procedure: COLONOSCOPY;  Surgeon: Jared Antonio MD;  Location: [facility];  Service: Endoscopy;  Laterality: N/A;    CYSTOSCOPY W/ RETROGRADES N/A 2023    Procedure: CYSTOSCOPY, WITH RETROGRADE PYELOGRAM;  Surgeon: Sergio Soria MD;  Location: [facility] OR;  Service: Urology;  Laterality: N/A;    ECHOCARDIOGRAM,TRANSESOPHAGEAL N/A 2023    Procedure: Transesophageal echo (GT) intra-procedure log documentation;  Surgeon: Antoine Rivera MD;  Location: [facility] CATH/EP LAB;  Service: Cardiology;  Laterality: N/A;    HYSTERECTOMY      INSERTION OF CATHETER N/A 2023    Procedure: INSERTION, CATHETER;  Surgeon: Sergio Soria MD;  Location: [facility] OR;  Service: Urology;  Laterality: N/A;    OOPHORECTOMY      SHOULDER SURGERY      R    TRANSESOPHAGEAL ECHOCARDIOGRAM WITH POSSIBLE  CARDIOVERSION (GT W/ POSS CARDIOVERSION) N/A 5/4/2023    Procedure: Transesophageal echo (GT) intra-procedure log documentation;  Surgeon: Blade Phillip MD;  Location: [facility] CATH/EP LAB;  Service: Cardiology;  Laterality: N/A;        Home Medications:  Medications Prior to Admission   Medication Sig Dispense Refill Last Dose    allopurinoL (ZYLOPRIM) 100 MG tablet 100 mg by Per G Tube route once daily.   2/23/2024 at 08:00    amLODIPine (NORVASC) 10 MG tablet Take 1 tablet (10 mg total) by mouth once daily. (Patient taking differently: 10 mg by Per G Tube route once daily.) 90 tablet 3 2/23/2024 at 05:00    apixaban (ELIQUIS) 2.5 mg Tab 2.5 mg by Per G Tube route 2 (two) times daily.   2/23/2024 at 05:00    ascorbic acid, vitamin C, (VITAMIN C) 500 MG tablet 500 mg by Per G Tube route 2 (two) times daily.   2/23/2024 at 05:00    aspirin 81 MG Chew Take 1 tablet (81 mg total) by mouth once daily. (Patient taking differently: 81 mg by Per G Tube route once daily.) 30 tablet 0 2/23/2024 at 05:00    atorvastatin (LIPITOR) 80 MG tablet Take 1 tablet (80 mg total) by mouth every evening. (Patient taking differently: 80 mg by Per G Tube route every evening.) 30 tablet 0 2/22/2024 at 17:00    folic acid (FOLVITE) 1 MG tablet 1 tablet by Per G Tube route every morning.   2/23/2024 at 05:00    furosemide (LASIX) 40 MG tablet 40 mg by Per G Tube route once daily.   2/7/2024 at 05:00    HUMALOG KWIKPEN INSULIN 100 unit/mL pen Inject 0-20 Units into the skin As instructed (inject 4 times daily per sliding scale). 0 -149 = 0 units  150 - 199 = 4 units  200 - 249 = 8 units  250 - 299 = 12 units  300 - 349 = 16 units  350 - 1000 = 20 units Call MD   2/23/2024 at 06:00    insulin (LANTUS SOLOSTAR U-100 INSULIN) glargine 100 units/mL SubQ pen Inject 35 Units into the skin once daily.   2/23/2024 at 06:00    insulin lispro 100 unit/mL injection Inject 2 Units into the skin 3 (three) times daily before meals. 07:30  11:30  16:30    2/23/2024 at 07:30    latanoprost 0.005 % ophthalmic solution Place 1 drop into both eyes once daily. (Patient taking differently: Place 1 drop into both eyes every evening.) 7.5 mL 6 2/22/2024 at 17:00    levETIRAcetam (KEPPRA) 100 mg/mL Soln 500 mg by Per G Tube route 2 (two) times daily.   2/23/2024 at 05:00    losartan (COZAAR) 100 MG tablet Take 1 tablet (100 mg total) by mouth once daily. (Patient taking differently: 100 mg by Per G Tube route once daily.) 90 tablet 3 2/23/2024 at 05:00    magnesium oxide (MAG-OX) 400 mg (241.3 mg magnesium) tablet 400 mg by Per G Tube route once daily.   2/23/2024 at 05:00    metFORMIN (GLUCOPHAGE) 500 MG tablet 1,000 mg by Per G Tube route 2 (two) times daily with meals.   2/23/2024 at 05:00    metoclopramide HCl (REGLAN) 5 mg/5 mL Soln 10 mg by Per G Tube route 3 (three) times daily.   2/23/2024 at 08:00    metoprolol tartrate (LOPRESSOR) 25 MG tablet 25 mg by Per G Tube route 2 (two) times daily.   2/23/2024 at 05:00    multivitamin (THERAGRAN) per tablet 1 tablet by Per G Tube route once daily.   2/23/2024 at 05:00    omeprazole (PRILOSEC) 20 MG capsule Take 20 mg by mouth once daily. Per G-Tube   2/23/2024 at 09:00    polyethylene glycol (GLYCOLAX) 17 gram PwPk 17 g by Per G Tube route once daily.   2/23/2024 at 05:00    thiamine (VITAMIN B-1) 100 MG tablet 100 mg by Per G Tube route once daily.   2/23/2024 at 05:00    zinc sulfate (ZINC-220 ORAL) 1 tablet by PEG Tube route Daily.   2/23/2024 at 05:00    baclofen (LIORESAL) 5 mg Tab tablet 5 mg by Per G Tube route every 8 (eight) hours as needed (muscle spasms).   Unknown    cloNIDine (CATAPRES) 0.1 MG tablet Take 1 tablet (0.1 mg total) by mouth 3 (three) times daily. (Patient taking differently: Take 0.1 mg by mouth 4 (four) times daily as needed (For systolic over 160).) 90 tablet 11 Unknown    tiotropium (SPIRIVA) 18 mcg inhalation capsule Inhale 1 capsule (18 mcg total) into the lungs once daily. Controller 30  "capsule 0        Inpatient Medications:   insulin detemir U-100  10 Units Subcutaneous Daily    levetiracetam  500 mg Per G Tube BID    magnesium oxide  400 mg Per G Tube Daily    metoprolol tartrate  25 mg Per G Tube BID    pantoprazole  40 mg Intravenous Q12H    piperacillin-tazobactam (Zosyn) IV (PEDS and ADULTS) (extended infusion is not appropriate)  3.375 g Intravenous Q8H    polyethylene glycol  17 g Per G Tube Daily     0.9%  NaCl infusion (for blood administration), acetaminophen, acetaminophen, aluminum-magnesium hydroxide-simethicone, baclofen, dextrose 50% in water (D50W), dextrose 50% in water (D50W), glucagon (human recombinant), glucose, glucose, insulin aspart U-100, magnesium oxide, magnesium oxide, ondansetron, potassium bicarbonate, potassium bicarbonate, potassium bicarbonate, potassium, sodium phosphates, potassium, sodium phosphates, potassium, sodium phosphates    Review of patient's allergies indicates:  No Known Allergies    Social History:   Social History     Occupational History     Comment: disabled due to shoulder problems   Tobacco Use    Smoking status: Never    Smokeless tobacco: Never   Substance and Sexual Activity    Alcohol use: No    Drug use: No    Sexual activity: Not Currently       Family History:   Family History   Problem Relation Age of Onset    Diabetes Mother     Asthma Mother     Hypertension Mother     Diabetes Father     Diabetes Brother     Hypertension Brother     Anemia Daughter     Glaucoma Neg Hx     Macular degeneration Neg Hx     Retinal detachment Neg Hx        Review of Systems:  Review of Systems   Unable to perform ROS: Patient nonverbal       OBJECTIVE:    Physical Exam:  24 Hour Vital Sign Ranges: Temp:  [98 °F (36.7 °C)-99.1 °F (37.3 °C)] 98.7 °F (37.1 °C)  Pulse:  [74-93] 76  Resp:  [15-18] 17  SpO2:  [97 %-100 %] 97 %  BP: (108-152)/(57-68) 123/63  Most recent vitals: /63   Pulse 76   Temp 98.7 °F (37.1 °C) (Axillary)   Resp 17   Ht 4' 11" " "(1.499 m)   Wt 54.2 kg (119 lb 7.8 oz)   SpO2 97%   BMI 24.13 kg/m²    CONSTITUTIONAL: laying in bed, NAD  Eyes: PERRL, anicteric conjunctivae  ENT: nares patent, oral mucosa pink and moist without lesion  NECK: trachea midline; Good ROM  CV: regular rate and rhythm, no murmurs or gallops  RESP: clear to auscultation bilaterally, no wheezes, rhonci or rales  ABD: soft, non-tender, non-distended, PEG in place  MSK: no swelling or edema, 2+ pulses distally  INTEGUMENT: warm/dry, no rashes or jaundice on limited skin exam  NEURO: awake, aphasic  PSYCH: unable to assess    Labs:   I have personally reviewed the pertinent/available labs in [facility].     Recent Labs     02/23/24  1411 02/24/24  0607   WBC 22.38* 17.98*   MCV 80* 80*   * 624*     Recent Labs     02/23/24  1411 02/24/24  0607   * 148*   K 4.6 4.0    115*   CO2 29 25   BUN 61* 45*   * 106     No results for input(s): "ALB" in the last 72 hours.    Invalid input(s): "ALKP", "SGOT", "SGPT", "TBIL", "DBIL", "TPRO"  No results for input(s): "PT", "INR", "PTT" in the last 72 hours.      Radiology Review:  I have personally reviewed the recent available imaging in Epic.    X-Ray Chest AP Portable   Final Result          Endoscopy:  I have personally reviewed and interpreted the reports and images from the endoscopy reports available in Epic.      IMPRESSION / RECOMMENDATIONS:  Acute on Chronic Anemia  Occult Blood Positive  PEG dependent  - Hb drop from 7 to 6. Improved with transfusion  - Hematology consulted.  - can do EGD to rule out upper GI bleeding. Will need family to consent. High risk in setting of stroke with chronic residual deficits  - Ok for tube feeds until midnight  - IV PPI BID  - Tentative plan for EGD tomorrow if we can get consent.    Thank you for this consult.    Alexandru May  2/24/2024  12:23 PM    "

## 2024-02-24 NOTE — ASSESSMENT & PLAN NOTE
WBC higher than baseline  Started on iv abx for suspected UTI   Latest Reference Range & Units 12/14/23 05:00 12/15/23 05:51 12/16/23 05:07 12/17/23 04:57 12/18/23 05:03 12/19/23 04:09 02/23/24 14:11   WBC 3.90 - 12.70 K/uL 18.11 (H) 18.90 (H) 17.53 (H) 19.72 (H) 17.48 (H) 15.27 (H) 22.38 (H)   (H): Data is abnormally high

## 2024-02-24 NOTE — PLAN OF CARE
02/24/24 1428   POTTER Message   Medicare Outpatient and Observation Notification regarding financial responsibility Given to patient/caregiver;Explained to patient/caregiver;Signed/date by patient/caregiver   Date POTTER was signed 02/24/24   Time POTTER was signed 1410

## 2024-02-24 NOTE — CONSULTS
"[facility]  Adult Nutrition   Consult Note (Nutrition Support Management)    SUMMARY     Recommendations  1) RD recommends to initiate PEG-tube feedings with Glucerna 1.2 at 25 mls/hr advancing by 15 mls every 4 to 6 hours as tolerated to the goal of 55 mls/hr to provide 1584 kcals, 79 g protein and 1063 mls water.   2) Provide FWF's of 30 mls every 4 hours to clear tube and assist in meeting fluid needs.   3) RD to monitor rate/tolerance, weight, labs, etc. and make recomendations prn.    Goals:   1) Pt to have nutrition provision to meet 100% of her EEN/EPN by follow up.   2) Pt to have complete healing of wounds over the next several months.    Nutrition Goal Status: goal not met    Communication of RD Recs: other (comment)    Dietitian Rounds Brief  Consult for PEG feedings: TF recommendation made but it has not been started yet. Will follow up with tolerance on Monday.    Social Determinants of Health:  64 year old pt getting transferred from facility with H/o weakness/ Low H/H and bladimir  Pt is bed bound and aphasic and has History of frequent hospitalizations  Random labs were obtained in facility and pt was found to be anemic    Nutrition Diagnosis PES Statement: Swallowing difficulty related to h/o stroke as evidenced by Pt having a PEG tube for nutrition support.    Diet order:   Current Diet Order: NPO      Evaluation of Received Nutrient/Fluid Intake  Energy Calories Required: not meeting needs  Protein Required: not meeting needs  Fluid Required: meeting needs  Tolerance: other (see comments)     % Intake of Estimated Energy Needs: 0%  % Meal Intake: NPO      Intake/Output Summary (Last 24 hours) at 2/24/2024 0913  Last data filed at 2/24/2024 0518  Gross per 24 hour   Intake 0 ml   Output 850 ml   Net -850 ml        Anthropometrics  Temp: 98 °F (36.7 °C)  Height Method: Stated  Height: 4' 11" (149.9 cm)  Height (inches): 59 in  Weight Method: Bed Scale  Weight: 54.2 kg (119 lb 7.8 " oz)  Weight (lb): 119.49 lb  Ideal Body Weight (IBW), Female: 95 lb  % Ideal Body Weight, Female (lb): 125.78 %  BMI (Calculated): 24.1  BMI Grade: 18.5-24.9 - normal       Estimated/Assessed Needs  Weight Used For Calorie Calculations: 54.2 kg (119 lb 7.8 oz)  Energy Calorie Requirements (kcal): 7727-1578 kcals/day (25-30 kcals/kg ABW)  Energy Need Method: Kcal/kg  Protein Requirements: 65-81 g/day (1.2-1.5 g/kg ABW)  Weight Used For Protein Calculations: 54.2 kg (119 lb 7.8 oz)     Estimated Fluid Requirement Method: RDA Method  RDA Method (mL): 1355  CHO Requirement: 169-203 g CHO/day    Reason for Assessment  Reason For Assessment: consult, other (see comments) (PEG feeding)  Diagnosis: other (see comments) (Acute on chronic anemia)  Relevant Medical History: Diabetes mellitus, Hypertension, COPD (chronic obstructive pulmonary disease), Arthritis, Wears glasses, Stroke, Complete tear of left rotator cuff, H/o Cerebrovascular accident (CVA) of left pontine structure, COVID-19 infection, Personal history of colonic polyps, 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, Cognitive communication deficit, Impaired functional mobility, balance, gait, and endurance, Thrombocytosis, Pressure injury of sacral region, stage 4, Leucocytosis, Bedridden, Chronic indwelling Mathur catheter, UTI (urinary tract infection), PEG (percutaneous endoscopic gastrostomy) status, Seizure disorder, Frequent hospital admissions  Interdisciplinary Rounds: did not attend  Nutrition Discharge Planning: Pending    Nutrition/Diet History  Food Allergies: NKFA  Factors Affecting Nutritional Intake: NPO    Nutrition Risk Screen  Nutrition Risk Screen: tube feeding or parenteral nutrition       Altered Skin Integrity 10/24/23 2311 medial Coccyx Ulceration Full thickness tissue loss. Subcutaneous fat may be visible but bone, tendon or muscle are not exposed-Wound Image: Images linked  MST Score: 2  Have you recently lost weight without trying?: Yes:  Unsure how much  Weight loss score: 2  Have you been eating poorly because of a decreased appetite?: No  Appetite score: 0       Weight History:  Wt Readings from Last 5 Encounters:   02/23/24 54.2 kg (119 lb 7.8 oz)   02/14/24 61.7 kg (136 lb)   01/09/24 61.7 kg (136 lb)   12/06/23 61.8 kg (136 lb 4.3 oz)   12/12/23 61.7 kg (136 lb)        Lab/Procedures/Meds: Pertinent Labs/Meds Reviewed    Medications:Pertinent Medications Reviewed  Scheduled Meds:   insulin detemir U-100  10 Units Subcutaneous Daily    levetiracetam  500 mg Per G Tube BID    magnesium oxide  400 mg Per G Tube Daily    metoprolol tartrate  25 mg Per G Tube BID    pantoprazole  40 mg Intravenous Q12H    piperacillin-tazobactam (Zosyn) IV (PEDS and ADULTS) (extended infusion is not appropriate)  3.375 g Intravenous Q8H    polyethylene glycol  17 g Per G Tube Daily     Continuous Infusions:  PRN Meds:.0.9%  NaCl infusion (for blood administration), acetaminophen, acetaminophen, aluminum-magnesium hydroxide-simethicone, baclofen, dextrose 50% in water (D50W), dextrose 50% in water (D50W), glucagon (human recombinant), glucose, glucose, insulin aspart U-100, magnesium oxide, magnesium oxide, ondansetron, potassium bicarbonate, potassium bicarbonate, potassium bicarbonate, potassium, sodium phosphates, potassium, sodium phosphates, potassium, sodium phosphates    Labs: Pertinent Labs Reviewed  Clinical Chemistry:  Recent Labs   Lab 02/23/24  1411 02/24/24  0607   * 148*   K 4.6 4.0    115*   CO2 29 25   * 106   BUN 61* 45*   CREATININE 1.4 1.1   CALCIUM 9.7 8.8   PROT 8.7* 7.2   ALBUMIN 3.4* 2.8*   BILITOT 0.4 0.4   ALKPHOS 98 70   AST 16 13   ALT 17 12   ANIONGAP 9 8   MG 2.3 2.0     CBC:   Recent Labs   Lab 02/24/24  0607   WBC 17.98*   RBC 2.59*   HGB 6.2*   HCT 20.7*   *   MCV 80*   MCH 23.9*   MCHC 30.0*       Cardiac Profile:  Recent Labs   Lab 02/23/24  1411   BNP 22       Monitor and Evaluation  Food and Nutrient  Intake: enteral nutrition intake  Food and Nutrient Adminstration: enteral and parenteral nutrition administration  Knowledge/Beliefs/Attitudes: beliefs and attitudes, food and nutrition knowledge/skill  Physical Activity and Function: nutrition-related ADLs and IADLs, factors affecting access to physical activity  Anthropometric Measurements: weight, weight change, body mass index  Biochemical Data, Medical Tests and Procedures: lipid profile, inflammatory profile, glucose/endocrine profile, gastrointestinal profile, electrolyte and renal panel  Nutrition-Focused Physical Findings: overall appearance     Nutrition Risk  Level of Risk/Frequency of Follow-up: high     Nutrition Follow-Up  RD Follow-up?: Yes      Cira Hernandez RD 02/24/2024 9:13 AM

## 2024-02-24 NOTE — PLAN OF CARE
Problem: Feeding Intolerance (Enteral Nutrition)  Goal: Feeding Tolerance  Outcome: Ongoing, Progressing  Intervention: Prevent and Manage Feeding Intolerance  Flowsheets (Taken 2/24/2024 6662)  Nutrition Support Management: tube feeding initiated

## 2024-02-25 ENCOUNTER — ANESTHESIA EVENT (OUTPATIENT)
Dept: SURGERY | Facility: HOSPITAL | Age: 64
DRG: 377 | End: 2024-02-25
Payer: MEDICARE

## 2024-02-25 ENCOUNTER — ANESTHESIA (OUTPATIENT)
Dept: SURGERY | Facility: HOSPITAL | Age: 64
DRG: 377 | End: 2024-02-25
Payer: MEDICARE

## 2024-02-25 LAB
ALBUMIN SERPL BCP-MCNC: 2.9 G/DL (ref 3.5–5.2)
ALP SERPL-CCNC: 80 U/L (ref 55–135)
ALT SERPL W/O P-5'-P-CCNC: 10 U/L (ref 10–44)
ANION GAP SERPL CALC-SCNC: 11 MMOL/L (ref 8–16)
AST SERPL-CCNC: 14 U/L (ref 10–40)
BASOPHILS # BLD AUTO: 0.08 K/UL (ref 0–0.2)
BASOPHILS NFR BLD: 0.6 % (ref 0–1.9)
BILIRUB SERPL-MCNC: 0.4 MG/DL (ref 0.1–1)
BUN SERPL-MCNC: 27 MG/DL (ref 8–23)
CALCIUM SERPL-MCNC: 8.9 MG/DL (ref 8.7–10.5)
CHLORIDE SERPL-SCNC: 117 MMOL/L (ref 95–110)
CO2 SERPL-SCNC: 20 MMOL/L (ref 23–29)
CREAT SERPL-MCNC: 1 MG/DL (ref 0.5–1.4)
DIFFERENTIAL METHOD BLD: ABNORMAL
EOSINOPHIL # BLD AUTO: 0.2 K/UL (ref 0–0.5)
EOSINOPHIL NFR BLD: 1.6 % (ref 0–8)
ERYTHROCYTE [DISTWIDTH] IN BLOOD BY AUTOMATED COUNT: 19.8 % (ref 11.5–14.5)
EST. GFR  (NO RACE VARIABLE): >60 ML/MIN/1.73 M^2
GLUCOSE SERPL-MCNC: 123 MG/DL (ref 70–110)
GLUCOSE SERPL-MCNC: 124 MG/DL (ref 70–110)
GLUCOSE SERPL-MCNC: 131 MG/DL (ref 70–110)
GLUCOSE SERPL-MCNC: 186 MG/DL (ref 70–110)
HCT VFR BLD AUTO: 25.3 % (ref 37–48.5)
HCT VFR BLD AUTO: 25.4 % (ref 37–48.5)
HCT VFR BLD AUTO: 26.4 % (ref 37–48.5)
HCT VFR BLD AUTO: 26.4 % (ref 37–48.5)
HGB BLD-MCNC: 8 G/DL (ref 12–16)
HGB BLD-MCNC: 8.1 G/DL (ref 12–16)
HGB BLD-MCNC: 8.2 G/DL (ref 12–16)
HGB BLD-MCNC: 8.3 G/DL (ref 12–16)
IMM GRANULOCYTES # BLD AUTO: 0.21 K/UL (ref 0–0.04)
IMM GRANULOCYTES NFR BLD AUTO: 1.5 % (ref 0–0.5)
LYMPHOCYTES # BLD AUTO: 2 K/UL (ref 1–4.8)
LYMPHOCYTES NFR BLD: 15 % (ref 18–48)
MAGNESIUM SERPL-MCNC: 1.9 MG/DL (ref 1.6–2.6)
MCH RBC QN AUTO: 26.5 PG (ref 27–31)
MCHC RBC AUTO-ENTMCNC: 31.9 G/DL (ref 32–36)
MCV RBC AUTO: 83 FL (ref 82–98)
MONOCYTES # BLD AUTO: 1.7 K/UL (ref 0.3–1)
MONOCYTES NFR BLD: 12.6 % (ref 4–15)
NEUTROPHILS # BLD AUTO: 9.3 K/UL (ref 1.8–7.7)
NEUTROPHILS NFR BLD: 68.7 % (ref 38–73)
NRBC BLD-RTO: 0 /100 WBC
PLATELET # BLD AUTO: 637 K/UL (ref 150–450)
PMV BLD AUTO: 10.8 FL (ref 9.2–12.9)
POTASSIUM SERPL-SCNC: 3.9 MMOL/L (ref 3.5–5.1)
PROT SERPL-MCNC: 7.3 G/DL (ref 6–8.4)
RBC # BLD AUTO: 3.06 M/UL (ref 4–5.4)
SODIUM SERPL-SCNC: 148 MMOL/L (ref 136–145)
WBC # BLD AUTO: 13.6 K/UL (ref 3.9–12.7)

## 2024-02-25 PROCEDURE — 21400001 HC TELEMETRY ROOM

## 2024-02-25 PROCEDURE — 37000008 HC ANESTHESIA 1ST 15 MINUTES: Performed by: STUDENT IN AN ORGANIZED HEALTH CARE EDUCATION/TRAINING PROGRAM

## 2024-02-25 PROCEDURE — 63600175 PHARM REV CODE 636 W HCPCS: Performed by: INTERNAL MEDICINE

## 2024-02-25 PROCEDURE — 85025 COMPLETE CBC W/AUTO DIFF WBC: CPT | Performed by: INTERNAL MEDICINE

## 2024-02-25 PROCEDURE — 85014 HEMATOCRIT: CPT | Performed by: NURSE PRACTITIONER

## 2024-02-25 PROCEDURE — 36415 COLL VENOUS BLD VENIPUNCTURE: CPT | Performed by: NURSE PRACTITIONER

## 2024-02-25 PROCEDURE — 85014 HEMATOCRIT: CPT | Mod: 91 | Performed by: NURSE PRACTITIONER

## 2024-02-25 PROCEDURE — 63600175 PHARM REV CODE 636 W HCPCS: Performed by: NURSE ANESTHETIST, CERTIFIED REGISTERED

## 2024-02-25 PROCEDURE — 27200043 HC FORCEPS, BIOPSY: Performed by: STUDENT IN AN ORGANIZED HEALTH CARE EDUCATION/TRAINING PROGRAM

## 2024-02-25 PROCEDURE — 25000003 PHARM REV CODE 250: Performed by: INTERNAL MEDICINE

## 2024-02-25 PROCEDURE — C9113 INJ PANTOPRAZOLE SODIUM, VIA: HCPCS | Performed by: NURSE PRACTITIONER

## 2024-02-25 PROCEDURE — 43239 EGD BIOPSY SINGLE/MULTIPLE: CPT | Performed by: STUDENT IN AN ORGANIZED HEALTH CARE EDUCATION/TRAINING PROGRAM

## 2024-02-25 PROCEDURE — 27202049 HC PROBE, APC ERBE: Performed by: STUDENT IN AN ORGANIZED HEALTH CARE EDUCATION/TRAINING PROGRAM

## 2024-02-25 PROCEDURE — 36415 COLL VENOUS BLD VENIPUNCTURE: CPT | Performed by: INTERNAL MEDICINE

## 2024-02-25 PROCEDURE — D9220A PRA ANESTHESIA: Mod: ANES,,, | Performed by: ANESTHESIOLOGY

## 2024-02-25 PROCEDURE — 82962 GLUCOSE BLOOD TEST: CPT

## 2024-02-25 PROCEDURE — 99233 SBSQ HOSP IP/OBS HIGH 50: CPT | Mod: ,,, | Performed by: INTERNAL MEDICINE

## 2024-02-25 PROCEDURE — 85018 HEMOGLOBIN: CPT | Performed by: NURSE PRACTITIONER

## 2024-02-25 PROCEDURE — 85018 HEMOGLOBIN: CPT | Mod: 91 | Performed by: NURSE PRACTITIONER

## 2024-02-25 PROCEDURE — 83735 ASSAY OF MAGNESIUM: CPT | Performed by: INTERNAL MEDICINE

## 2024-02-25 PROCEDURE — 88312 SPECIAL STAINS GROUP 1: CPT | Mod: TC | Performed by: PATHOLOGY

## 2024-02-25 PROCEDURE — 80053 COMPREHEN METABOLIC PANEL: CPT | Performed by: INTERNAL MEDICINE

## 2024-02-25 PROCEDURE — 0DB68ZX EXCISION OF STOMACH, VIA NATURAL OR ARTIFICIAL OPENING ENDOSCOPIC, DIAGNOSTIC: ICD-10-PCS | Performed by: STUDENT IN AN ORGANIZED HEALTH CARE EDUCATION/TRAINING PROGRAM

## 2024-02-25 PROCEDURE — 63600175 PHARM REV CODE 636 W HCPCS: Performed by: NURSE PRACTITIONER

## 2024-02-25 PROCEDURE — 0W3P8ZZ CONTROL BLEEDING IN GASTROINTESTINAL TRACT, VIA NATURAL OR ARTIFICIAL OPENING ENDOSCOPIC: ICD-10-PCS | Performed by: STUDENT IN AN ORGANIZED HEALTH CARE EDUCATION/TRAINING PROGRAM

## 2024-02-25 PROCEDURE — 43255 EGD CONTROL BLEEDING ANY: CPT | Mod: 59 | Performed by: STUDENT IN AN ORGANIZED HEALTH CARE EDUCATION/TRAINING PROGRAM

## 2024-02-25 PROCEDURE — D9220A PRA ANESTHESIA: Mod: CRNA,,, | Performed by: NURSE ANESTHETIST, CERTIFIED REGISTERED

## 2024-02-25 PROCEDURE — 88305 TISSUE EXAM BY PATHOLOGIST: CPT | Mod: TC | Performed by: PATHOLOGY

## 2024-02-25 RX ORDER — PROPOFOL 10 MG/ML
INJECTION, EMULSION INTRAVENOUS
Status: DISCONTINUED | OUTPATIENT
Start: 2024-02-25 | End: 2024-02-25

## 2024-02-25 RX ORDER — PANTOPRAZOLE SODIUM 40 MG/10ML
40 INJECTION, POWDER, LYOPHILIZED, FOR SOLUTION INTRAVENOUS 2 TIMES DAILY
Status: DISCONTINUED | OUTPATIENT
Start: 2024-02-25 | End: 2024-02-29 | Stop reason: HOSPADM

## 2024-02-25 RX ORDER — SODIUM CHLORIDE, SODIUM LACTATE, POTASSIUM CHLORIDE, CALCIUM CHLORIDE 600; 310; 30; 20 MG/100ML; MG/100ML; MG/100ML; MG/100ML
INJECTION, SOLUTION INTRAVENOUS CONTINUOUS PRN
Status: DISCONTINUED | OUTPATIENT
Start: 2024-02-25 | End: 2024-02-25

## 2024-02-25 RX ADMIN — PIPERACILLIN SODIUM AND TAZOBACTAM SODIUM 3.38 G: 3; .375 INJECTION, POWDER, LYOPHILIZED, FOR SOLUTION INTRAVENOUS at 01:02

## 2024-02-25 RX ADMIN — PROPOFOL 50 MG: 10 INJECTION, EMULSION INTRAVENOUS at 10:02

## 2024-02-25 RX ADMIN — METOPROLOL TARTRATE 25 MG: 25 TABLET, FILM COATED ORAL at 09:02

## 2024-02-25 RX ADMIN — PIPERACILLIN SODIUM AND TAZOBACTAM SODIUM 3.38 G: 3; .375 INJECTION, POWDER, LYOPHILIZED, FOR SOLUTION INTRAVENOUS at 09:02

## 2024-02-25 RX ADMIN — PIPERACILLIN SODIUM AND TAZOBACTAM SODIUM 3.38 G: 3; .375 INJECTION, POWDER, LYOPHILIZED, FOR SOLUTION INTRAVENOUS at 11:02

## 2024-02-25 RX ADMIN — PANTOPRAZOLE SODIUM 40 MG: 40 INJECTION, POWDER, FOR SOLUTION INTRAVENOUS at 10:02

## 2024-02-25 RX ADMIN — LEVETIRACETAM 500 MG: 100 SOLUTION ORAL at 09:02

## 2024-02-25 RX ADMIN — SODIUM CHLORIDE, SODIUM LACTATE, POTASSIUM CHLORIDE, AND CALCIUM CHLORIDE: .6; .31; .03; .02 INJECTION, SOLUTION INTRAVENOUS at 10:02

## 2024-02-25 RX ADMIN — MUPIROCIN 1 G: 20 OINTMENT TOPICAL at 09:02

## 2024-02-25 RX ADMIN — PANTOPRAZOLE SODIUM 40 MG: 40 INJECTION, POWDER, FOR SOLUTION INTRAVENOUS at 05:02

## 2024-02-25 NOTE — SUBJECTIVE & OBJECTIVE
INTERVAL HISTORY: non-verbal, contractures, decondition          Past Medical History:   Diagnosis Date    Arthritis     Complete tear of left rotator cuff 11/09/2017    COPD (chronic obstructive pulmonary disease)     COVID-19 infection 07/02/2021    Diabetes mellitus     H/o Cerebrovascular accident (CVA) of left pontine structure 12/12/2019    Hypertension     Personal history of colonic polyps     Stroke     Wears glasses        Past Surgical History:   Procedure Laterality Date    COLONOSCOPY N/A 4/11/2017    Procedure: COLONOSCOPY;  Surgeon: Jared Antonio MD;  Location: [facility] ENDO;  Service: Endoscopy;  Laterality: N/A;    CYSTOSCOPY W/ RETROGRADES N/A 12/14/2023    Procedure: CYSTOSCOPY, WITH RETROGRADE PYELOGRAM;  Surgeon: Sergio Soria MD;  Location: [facility] OR;  Service: Urology;  Laterality: N/A;    ECHOCARDIOGRAM,TRANSESOPHAGEAL N/A 12/12/2023    Procedure: Transesophageal echo (GT) intra-procedure log documentation;  Surgeon: Antoine Rivera MD;  Location: [facility] CATH/EP LAB;  Service: Cardiology;  Laterality: N/A;    HYSTERECTOMY      INSERTION OF CATHETER N/A 12/14/2023    Procedure: INSERTION, CATHETER;  Surgeon: Sergio Soria MD;  Location: [facility] OR;  Service: Urology;  Laterality: N/A;    OOPHORECTOMY      SHOULDER SURGERY      R    TRANSESOPHAGEAL ECHOCARDIOGRAM WITH POSSIBLE CARDIOVERSION (GT W/ POSS CARDIOVERSION) N/A 5/4/2023    Procedure: Transesophageal echo (GT) intra-procedure log documentation;  Surgeon: Blade Phillip MD;  Location: [facility] CATH/EP LAB;  Service: Cardiology;  Laterality: N/A;       Review of patient's allergies indicates:  No Known Allergies    No current facility-administered medications on file prior to encounter.     Current Outpatient Medications on File Prior to Encounter   Medication Sig    allopurinoL (ZYLOPRIM) 100 MG tablet 100 mg by Per G Tube route once daily.    amLODIPine (NORVASC) 10 MG tablet Take 1 tablet (10 mg total) by mouth once daily.  (Patient taking differently: 10 mg by Per G Tube route once daily.)    apixaban (ELIQUIS) 2.5 mg Tab 2.5 mg by Per G Tube route 2 (two) times daily.    ascorbic acid, vitamin C, (VITAMIN C) 500 MG tablet 500 mg by Per G Tube route 2 (two) times daily.    aspirin 81 MG Chew Take 1 tablet (81 mg total) by mouth once daily. (Patient taking differently: 81 mg by Per G Tube route once daily.)    atorvastatin (LIPITOR) 80 MG tablet Take 1 tablet (80 mg total) by mouth every evening. (Patient taking differently: 80 mg by Per G Tube route every evening.)    folic acid (FOLVITE) 1 MG tablet 1 tablet by Per G Tube route every morning.    furosemide (LASIX) 40 MG tablet 40 mg by Per G Tube route once daily.    HUMALOG KWIKPEN INSULIN 100 unit/mL pen Inject 0-20 Units into the skin As instructed (inject 4 times daily per sliding scale). 0 -149 = 0 units  150 - 199 = 4 units  200 - 249 = 8 units  250 - 299 = 12 units  300 - 349 = 16 units  350 - 1000 = 20 units Call MD    insulin (LANTUS SOLOSTAR U-100 INSULIN) glargine 100 units/mL SubQ pen Inject 35 Units into the skin once daily.    insulin lispro 100 unit/mL injection Inject 2 Units into the skin 3 (three) times daily before meals. 07:30  11:30  16:30    latanoprost 0.005 % ophthalmic solution Place 1 drop into both eyes once daily. (Patient taking differently: Place 1 drop into both eyes every evening.)    levETIRAcetam (KEPPRA) 100 mg/mL Soln 500 mg by Per G Tube route 2 (two) times daily.    losartan (COZAAR) 100 MG tablet Take 1 tablet (100 mg total) by mouth once daily. (Patient taking differently: 100 mg by Per G Tube route once daily.)    magnesium oxide (MAG-OX) 400 mg (241.3 mg magnesium) tablet 400 mg by Per G Tube route once daily.    metFORMIN (GLUCOPHAGE) 500 MG tablet 1,000 mg by Per G Tube route 2 (two) times daily with meals.    metoclopramide HCl (REGLAN) 5 mg/5 mL Soln 10 mg by Per G Tube route 3 (three) times daily.    metoprolol tartrate (LOPRESSOR) 25 MG  tablet 25 mg by Per G Tube route 2 (two) times daily.    multivitamin (THERAGRAN) per tablet 1 tablet by Per G Tube route once daily.    omeprazole (PRILOSEC) 20 MG capsule Take 20 mg by mouth once daily. Per G-Tube    polyethylene glycol (GLYCOLAX) 17 gram PwPk 17 g by Per G Tube route once daily.    thiamine (VITAMIN B-1) 100 MG tablet 100 mg by Per G Tube route once daily.    zinc sulfate (ZINC-220 ORAL) 1 tablet by PEG Tube route Daily.    baclofen (LIORESAL) 5 mg Tab tablet 5 mg by Per G Tube route every 8 (eight) hours as needed (muscle spasms).    cloNIDine (CATAPRES) 0.1 MG tablet Take 1 tablet (0.1 mg total) by mouth 3 (three) times daily. (Patient taking differently: Take 0.1 mg by mouth 4 (four) times daily as needed (For systolic over 160).)    tiotropium (SPIRIVA) 18 mcg inhalation capsule Inhale 1 capsule (18 mcg total) into the lungs once daily. Controller    [DISCONTINUED] blood-glucose meter (TRUE METRIX GLUCOSE METER) kit Use as instructed    [DISCONTINUED] lancing device (TRUEDRAW LANCING DEVICE) Misc Inject 1 Device into the skin 2 (two) times daily.     Family History       Problem Relation (Age of Onset)    Anemia Daughter    Asthma Mother    Diabetes Mother, Father, Brother    Hypertension Mother, Brother          Tobacco Use    Smoking status: Never    Smokeless tobacco: Never   Substance and Sexual Activity    Alcohol use: No    Drug use: No    Sexual activity: Not Currently     Review of Systems   Unable to perform ROS: Patient nonverbal   Constitutional:  Positive for activity change and appetite change.   Gastrointestinal:         Tube feeding   Psychiatric/Behavioral:  Positive for confusion.         Nonverbal     Objective:     Vital Signs (Most Recent):  Temp: 96.1 °F (35.6 °C) (02/25/24 1138)  Pulse: 73 (02/25/24 1138)  Resp: 20 (02/25/24 1138)  BP: (!) 159/84 (02/25/24 1138)  SpO2: 99 % (02/25/24 1138) Vital Signs (24h Range):  Temp:  [96.1 °F (35.6 °C)-99.4 °F (37.4 °C)] 96.1 °F  (35.6 °C)  Pulse:  [69-82] 73  Resp:  [16-20] 20  SpO2:  [97 %-100 %] 99 %  BP: (132-173)/(61-91) 159/84     Weight: 54.2 kg (119 lb 7.8 oz)  Body mass index is 24.13 kg/m².     Physical Exam  Vitals and nursing note reviewed.   Constitutional:       General: She is not in acute distress.     Appearance: Normal appearance.      Comments: Body mass index is 24.13 kg/m².   HENT:      Right Ear: External ear normal.      Left Ear: External ear normal.      Nose: Nose normal.      Mouth/Throat:      Mouth: Mucous membranes are dry.   Cardiovascular:      Rate and Rhythm: Normal rate and regular rhythm.   Pulmonary:      Effort: Pulmonary effort is normal.   Abdominal:      Palpations: Abdomen is soft.   Musculoskeletal:         General: Deformity present.      Right lower leg: No edema.      Left lower leg: No edema.   Skin:     General: Skin is warm.   Neurological:      Mental Status: Mental status is at baseline.      Comments: nonverbal                                  Significant Labs: All pertinent labs within the past 24 hours have been reviewed.  CBC:   Recent Labs   Lab 02/23/24  1411 02/24/24  0607 02/24/24  0711 02/25/24  0026 02/25/24  0540 02/25/24  0815   WBC 22.38* 17.98*  --   --  13.60*  --    HGB 7.7* 6.2*   < > 8.0* 8.1* 8.2*   HCT 24.8* 20.7*   < > 25.3* 25.4* 26.4*   * 624*  --   --  637*  --     < > = values in this interval not displayed.       CMP:   Recent Labs   Lab 02/23/24  1411 02/24/24  0607 02/25/24  0540   * 148* 148*   K 4.6 4.0 3.9    115* 117*   CO2 29 25 20*   * 106 123*   BUN 61* 45* 27*   CREATININE 1.4 1.1 1.0   CALCIUM 9.7 8.8 8.9   PROT 8.7* 7.2 7.3   ALBUMIN 3.4* 2.8* 2.9*   BILITOT 0.4 0.4 0.4   ALKPHOS 98 70 80   AST 16 13 14   ALT 17 12 10   ANIONGAP 9 8 11         Significant Imaging: I have reviewed all pertinent imaging results/findings within the past 24 hours.

## 2024-02-25 NOTE — TRANSFER OF CARE
"Anesthesia Transfer of Care Note    Patient: Steff Johnson    Procedure(s) Performed: Procedure(s) (LRB):  EGD (ESOPHAGOGASTRODUODENOSCOPY) (N/A)    Patient location: GI    Anesthesia Type: general    Transport from OR: Transported from OR on 2-3 L/min O2 by NC with adequate spontaneous ventilation    Post pain: adequate analgesia    Post assessment: no apparent anesthetic complications and tolerated procedure well    Post vital signs: stable    Level of consciousness: sedated    Nausea/Vomiting: no nausea/vomiting    Complications: none    Transfer of care protocol was followed      Last vitals: Visit Vitals  /78 (BP Location: Right arm, Patient Position: Lying)   Pulse 71   Temp 37.4 °C (99.4 °F) (Axillary)   Resp 18   Ht 4' 11" (1.499 m)   Wt 54.2 kg (119 lb 7.8 oz)   SpO2 97%   BMI 24.13 kg/m²     "

## 2024-02-25 NOTE — H&P
GASTROENTEROLOGY ENDOSCOPY H&P    Patient Name: Steff Johnson  Patient MRN: [id number]  Patient : 1960    Admit Date: 2024  Service date: 2024    Reason for Endoscopy: Anemia    Chief Complaint   Patient presents with    Weakness     Pt sent from [facility] with abnormal labs. EMS stated that pts H&H is low. Pt is non verbal and is at baseline.       HPI: Patient is a 64 y.o. female who presents for EGD.      Past Medical History:  Past Medical History:   Diagnosis Date    Arthritis     Complete tear of left rotator cuff 2017    COPD (chronic obstructive pulmonary disease)     COVID-19 infection 2021    Diabetes mellitus     H/o Cerebrovascular accident (CVA) of left pontine structure 2019    Hypertension     Personal history of colonic polyps     Stroke     Wears glasses         Past Surgical History:  Past Surgical History:   Procedure Laterality Date    COLONOSCOPY N/A 2017    Procedure: COLONOSCOPY;  Surgeon: Jared Antonio MD;  Location: [facility];  Service: Endoscopy;  Laterality: N/A;    CYSTOSCOPY W/ RETROGRADES N/A 2023    Procedure: CYSTOSCOPY, WITH RETROGRADE PYELOGRAM;  Surgeon: Sergio Soria MD;  Location: [facility] OR;  Service: Urology;  Laterality: N/A;    ECHOCARDIOGRAM,TRANSESOPHAGEAL N/A 2023    Procedure: Transesophageal echo (GT) intra-procedure log documentation;  Surgeon: Antoine Rivera MD;  Location: [facility] CATH/EP LAB;  Service: Cardiology;  Laterality: N/A;    HYSTERECTOMY      INSERTION OF CATHETER N/A 2023    Procedure: INSERTION, CATHETER;  Surgeon: Sergio Soria MD;  Location: [facility] OR;  Service: Urology;  Laterality: N/A;    OOPHORECTOMY      SHOULDER SURGERY      R    TRANSESOPHAGEAL ECHOCARDIOGRAM WITH POSSIBLE CARDIOVERSION (GT W/ POSS CARDIOVERSION) N/A 2023    Procedure: Transesophageal echo (GT) intra-procedure log documentation;  Surgeon: Blade Phillip MD;  Location: [facility] CATH/EP LAB;  Service:  Cardiology;  Laterality: N/A;        Home Medications:  Medications Prior to Admission   Medication Sig Dispense Refill Last Dose    allopurinoL (ZYLOPRIM) 100 MG tablet 100 mg by Per G Tube route once daily.   2/23/2024 at 08:00    amLODIPine (NORVASC) 10 MG tablet Take 1 tablet (10 mg total) by mouth once daily. (Patient taking differently: 10 mg by Per G Tube route once daily.) 90 tablet 3 2/23/2024 at 05:00    apixaban (ELIQUIS) 2.5 mg Tab 2.5 mg by Per G Tube route 2 (two) times daily.   2/23/2024 at 05:00    ascorbic acid, vitamin C, (VITAMIN C) 500 MG tablet 500 mg by Per G Tube route 2 (two) times daily.   2/23/2024 at 05:00    aspirin 81 MG Chew Take 1 tablet (81 mg total) by mouth once daily. (Patient taking differently: 81 mg by Per G Tube route once daily.) 30 tablet 0 2/23/2024 at 05:00    atorvastatin (LIPITOR) 80 MG tablet Take 1 tablet (80 mg total) by mouth every evening. (Patient taking differently: 80 mg by Per G Tube route every evening.) 30 tablet 0 2/22/2024 at 17:00    folic acid (FOLVITE) 1 MG tablet 1 tablet by Per G Tube route every morning.   2/23/2024 at 05:00    furosemide (LASIX) 40 MG tablet 40 mg by Per G Tube route once daily.   2/7/2024 at 05:00    HUMALOG KWIKPEN INSULIN 100 unit/mL pen Inject 0-20 Units into the skin As instructed (inject 4 times daily per sliding scale). 0 -149 = 0 units  150 - 199 = 4 units  200 - 249 = 8 units  250 - 299 = 12 units  300 - 349 = 16 units  350 - 1000 = 20 units Call MD   2/23/2024 at 06:00    insulin (LANTUS SOLOSTAR U-100 INSULIN) glargine 100 units/mL SubQ pen Inject 35 Units into the skin once daily.   2/23/2024 at 06:00    insulin lispro 100 unit/mL injection Inject 2 Units into the skin 3 (three) times daily before meals. 07:30  11:30  16:30   2/23/2024 at 07:30    latanoprost 0.005 % ophthalmic solution Place 1 drop into both eyes once daily. (Patient taking differently: Place 1 drop into both eyes every evening.) 7.5 mL 6 2/22/2024 at  17:00    levETIRAcetam (KEPPRA) 100 mg/mL Soln 500 mg by Per G Tube route 2 (two) times daily.   2/23/2024 at 05:00    losartan (COZAAR) 100 MG tablet Take 1 tablet (100 mg total) by mouth once daily. (Patient taking differently: 100 mg by Per G Tube route once daily.) 90 tablet 3 2/23/2024 at 05:00    magnesium oxide (MAG-OX) 400 mg (241.3 mg magnesium) tablet 400 mg by Per G Tube route once daily.   2/23/2024 at 05:00    metFORMIN (GLUCOPHAGE) 500 MG tablet 1,000 mg by Per G Tube route 2 (two) times daily with meals.   2/23/2024 at 05:00    metoclopramide HCl (REGLAN) 5 mg/5 mL Soln 10 mg by Per G Tube route 3 (three) times daily.   2/23/2024 at 08:00    metoprolol tartrate (LOPRESSOR) 25 MG tablet 25 mg by Per G Tube route 2 (two) times daily.   2/23/2024 at 05:00    multivitamin (THERAGRAN) per tablet 1 tablet by Per G Tube route once daily.   2/23/2024 at 05:00    omeprazole (PRILOSEC) 20 MG capsule Take 20 mg by mouth once daily. Per G-Tube   2/23/2024 at 09:00    polyethylene glycol (GLYCOLAX) 17 gram PwPk 17 g by Per G Tube route once daily.   2/23/2024 at 05:00    thiamine (VITAMIN B-1) 100 MG tablet 100 mg by Per G Tube route once daily.   2/23/2024 at 05:00    zinc sulfate (ZINC-220 ORAL) 1 tablet by PEG Tube route Daily.   2/23/2024 at 05:00    baclofen (LIORESAL) 5 mg Tab tablet 5 mg by Per G Tube route every 8 (eight) hours as needed (muscle spasms).   Unknown    cloNIDine (CATAPRES) 0.1 MG tablet Take 1 tablet (0.1 mg total) by mouth 3 (three) times daily. (Patient taking differently: Take 0.1 mg by mouth 4 (four) times daily as needed (For systolic over 160).) 90 tablet 11 Unknown    tiotropium (SPIRIVA) 18 mcg inhalation capsule Inhale 1 capsule (18 mcg total) into the lungs once daily. Controller 30 capsule 0        Inpatient Medications:   insulin detemir U-100  10 Units Subcutaneous Daily    levetiracetam  500 mg Per G Tube BID    magnesium oxide  400 mg Per G Tube Daily    metoprolol tartrate   25 mg Per G Tube BID    mupirocin   Nasal BID    pantoprazole  40 mg Intravenous BID    piperacillin-tazobactam (Zosyn) IV (PEDS and ADULTS) (extended infusion is not appropriate)  3.375 g Intravenous Q8H    polyethylene glycol  17 g Per G Tube Daily     0.9%  NaCl infusion (for blood administration), 0.9%  NaCl infusion (for blood administration), acetaminophen, acetaminophen, aluminum-magnesium hydroxide-simethicone, baclofen, dextrose 50% in water (D50W), dextrose 50% in water (D50W), glucagon (human recombinant), glucose, glucose, insulin aspart U-100, magnesium oxide, magnesium oxide, ondansetron, potassium bicarbonate, potassium bicarbonate, potassium bicarbonate, potassium, sodium phosphates, potassium, sodium phosphates, potassium, sodium phosphates    Review of patient's allergies indicates:  No Known Allergies    Social History:   Social History     Occupational History     Comment: disabled due to shoulder problems   Tobacco Use    Smoking status: Never    Smokeless tobacco: Never   Substance and Sexual Activity    Alcohol use: No    Drug use: No    Sexual activity: Not Currently       Family History:   Family History   Problem Relation Age of Onset    Diabetes Mother     Asthma Mother     Hypertension Mother     Diabetes Father     Diabetes Brother     Hypertension Brother     Anemia Daughter     Glaucoma Neg Hx     Macular degeneration Neg Hx     Retinal detachment Neg Hx        Review of Systems:  A 10 point review of systems was performed and was normal, except as mentioned in the HPI, including constitutional, HEENT, heme, lymph, cardiovascular, respiratory, gastrointestinal, genitourinary, neurologic, endocrine, psychiatric and musculoskeletal.      OBJECTIVE:    Physical Exam:  24 Hour Vital Sign Ranges: Temp:  [97.3 °F (36.3 °C)-99.4 °F (37.4 °C)] 99.4 °F (37.4 °C)  Pulse:  [71-82] 71  Resp:  [16-18] 18  SpO2:  [97 %-100 %] 97 %  BP: (123-173)/(60-91) 136/78  Most recent vitals: /78 (BP  "Location: Right arm, Patient Position: Lying)   Pulse 71   Temp 99.4 °F (37.4 °C) (Axillary)   Resp 18   Ht 4' 11" (1.499 m)   Wt 54.2 kg (119 lb 7.8 oz)   SpO2 97%   BMI 24.13 kg/m²    ASA: 3    GEN: NAD, laying in bed  HEENT: PERRL, conjunctivae anicteric, oral mucosa pink and moist without lesion  NECK: trachea midline; Good ROM  CV: regular rate and rhythm, no murmurs or gallops  RESP: clear to auscultation bilaterally, no wheezes, rhonci or rales  ABD: soft, non-tender, non-distended, normal bowel sounds  EXT: no swelling or edema, 2+ pulses distally    Labs:   Recent Labs     02/23/24  1411 02/24/24  0607 02/25/24  0540   WBC 22.38* 17.98* 13.60*   MCV 80* 80* 83   * 624* 637*     Recent Labs     02/23/24  1411 02/24/24  0607 02/25/24  0540   * 148* 148*   K 4.6 4.0 3.9    115* 117*   CO2 29 25 20*   BUN 61* 45* 27*   * 106 123*     No results for input(s): "ALB" in the last 72 hours.    Invalid input(s): "ALKP", "SGOT", "SGPT", "TBIL", "DBIL", "TPRO"  No results for input(s): "PT", "INR", "PTT" in the last 72 hours.      Radiology Review:  X-Ray Chest AP Portable   Final Result          Endoscopy:  I have personally reviewed and interpreted the reports and images from the endoscopy reports available in Epic.    IMPRESSION / RECOMMENDATIONS:  Patient legally unable to provideinformed consent due to chronic mental status. Discussed with daughter by phone.    I have discussed the risks, benefits, and alternatives of the procedure in detail with the patient's daughter by phone. The risks include pain, discomfort, bleeding, infection, perforation, missed lesions or missed cancer, sedation/anesthesia risks, and even death. The benefit of the procedure is that it allows an evaluation of the reported problem or issue and possible intervention. The alternatives include not having the procedure or an alternative evaluation by another method.  The patient's daughter was given the " opportunity to ask questions and they were answered. Informed consent was obtained.     Proceed with EGD.    Alexandru May  2/25/2024  10:28 AM

## 2024-02-25 NOTE — PLAN OF CARE
Problem: Adult Inpatient Plan of Care  Goal: Plan of Care Review  Outcome: Ongoing, Progressing  Goal: Patient-Specific Goal (Individualized)  Outcome: Ongoing, Progressing  Goal: Absence of Hospital-Acquired Illness or Injury  Outcome: Ongoing, Progressing  Goal: Optimal Comfort and Wellbeing  Outcome: Ongoing, Progressing  Goal: Readiness for Transition of Care  Outcome: Ongoing, Progressing     Problem: Diabetes Comorbidity  Goal: Blood Glucose Level Within Targeted Range  Outcome: Ongoing, Progressing     Problem: Infection  Goal: Absence of Infection Signs and Symptoms  Outcome: Ongoing, Progressing     Problem: Impaired Wound Healing  Goal: Optimal Wound Healing  Outcome: Ongoing, Progressing     Problem: Skin Injury Risk Increased  Goal: Skin Health and Integrity  Outcome: Ongoing, Progressing     Problem: Aspiration (Enteral Nutrition)  Goal: Absence of Aspiration Signs and Symptoms  Outcome: Ongoing, Progressing     Problem: Device-Related Complication Risk (Enteral Nutrition)  Goal: Safe, Effective Therapy Delivery  Outcome: Ongoing, Progressing     Problem: Feeding Intolerance (Enteral Nutrition)  Goal: Feeding Tolerance  Outcome: Ongoing, Progressing     Problem: Adjustment to Illness (Gastrointestinal Bleeding)  Goal: Optimal Coping with Acute Illness  Outcome: Ongoing, Progressing     Problem: Bleeding (Gastrointestinal Bleeding)  Goal: Hemostasis  Outcome: Ongoing, Progressing

## 2024-02-25 NOTE — ASSESSMENT & PLAN NOTE
Creatine stable for now and GFR nomalized. BMP reviewed- noted Estimated Creatinine Clearance: 42.6 mL/min (based on SCr of 1 mg/dL). according to latest data. Based on current GFR, CKD stage is stage 3 - GFR 30-59.  Monitor UOP and serial BMP and adjust therapy as needed. Renally dose meds. Avoid nephrotoxic medications and procedures.

## 2024-02-25 NOTE — CARE UPDATE
MANNY spoke to Leonie Johnson, patient's daughter, to discuss her request to transfer patient to another nursing home. MANNY informed Ms. Johnson that  supervisor will call [facility] and ask to speak to the . MANNY further told Ms. Johnson that the   supervisor will attempt to speak with  on 2/26/2024 to facilitate a phone call with Ms. Johnson to discuss her concerns regarding patient's care. MANNY explained to Alex that patient will have to return to [facility] if acceptance from another facility is not obtained before patient's discharge. Ms. Johnson verbalized understanding. MANNY faxed records to nursing facilities within a 30 mile radius in an attempt to facilitate transfer.  supervisor informed MANNY that she will follow-up with this matter on 2/26/2024.

## 2024-02-25 NOTE — ANESTHESIA POSTPROCEDURE EVALUATION
Anesthesia Post Evaluation    Patient: Steff Johnson    Procedure(s) Performed: Procedure(s) (LRB):  EGD (ESOPHAGOGASTRODUODENOSCOPY) (N/A)    Final Anesthesia Type: general      Patient location during evaluation: GI PACU  Patient participation: No - Unable to Participate, Coma/Other Inability to Communicate  Level of consciousness: awake  Post-procedure vital signs: reviewed and stable  Pain management: adequate  Airway patency: patent    PONV status at discharge: No PONV  Anesthetic complications: no      Cardiovascular status: blood pressure returned to baseline, hemodynamically stable and stable  Respiratory status: unassisted, spontaneous ventilation and room air  Hydration status: euvolemic  Follow-up not needed.              Vitals Value Taken Time   /78 02/25/24 0722   Temp 37.4 °C (99.4 °F) 02/25/24 0722   Pulse 69 02/25/24 1057   Resp 18 02/25/24 0722   SpO2 100 % 02/25/24 1057   Vitals shown include unvalidated device data.      No case tracking events are documented in the log.      Pain/Estefania Score: No data recorded

## 2024-02-25 NOTE — ASSESSMENT & PLAN NOTE
WBC higher than baseline  Improving = 13 lower than has been for a while  Continue iv Zosyn for suspected UTI  Wounds vs urine vs blood  Urine cultures yeast less than 99K  Blood cultures - NGTD  waiting wound consult     Latest Reference Range & Units 12/14/23 05:00 12/15/23 05:51 12/16/23 05:07 12/17/23 04:57 12/18/23 05:03 12/19/23 04:09 02/23/24 14:11   WBC 3.90 - 12.70 K/uL 18.11 (H) 18.90 (H) 17.53 (H) 19.72 (H) 17.48 (H) 15.27 (H) 22.38 (H)   (H): Data is abnormally high

## 2024-02-25 NOTE — [employer] PATIENT INSTRUCTIONS
Discharge Summary/Instructions after an Endoscopic Procedure  Patient Name: Steff Johnson  Patient MRN: [id number]  Patient YOB: 1960 Sunday, February 25, 2024  Alexandru May MD  RESTRICTIONS:  During your procedure today, you received medications for sedation.  These   medications may affect your judgment, balance and coordination.  Therefore,   for 24 hours, you have the following restrictions:   - DO NOT drive a car, operate machinery, make legal/financial decisions,   sign important papers or drink alcohol.    ACTIVITY:  Today: no heavy lifting, straining or running due to procedural   sedation/anesthesia.  The following day: return to full activity including work.  DIET:  Eat and drink normally unless instructed otherwise.     TREATMENT FOR COMMON SIDE EFFECTS:  - Mild abdominal pain, nausea, belching, bloating or excessive gas:  rest,   eat lightly and use a heating pad.  - Sore Throat: treat with throat lozenges and/or gargle with warm salt   water.  - Because air was used during the procedure, expelling large amounts of air   from your rectum or belching is normal.  - If a bowel prep was taken, you may not have a bowel movement for 1-3 days.    This is normal.  SYMPTOMS TO WATCH FOR AND REPORT TO YOUR PHYSICIAN:  1. Abdominal pain or bloating, other than gas cramps.  2. Chest pain.  3. Back pain.  4. Signs of infection such as: chills or fever occurring within 24 hours   after the procedure.  5. Rectal bleeding, which would show as bright red, maroon, or black stools.   (A tablespoon of blood from the rectum is not serious, especially if   hemorrhoids are present.)  6. Vomiting.  7. Weakness or dizziness.  GO DIRECTLY TO THE NEAREST EMERGENCY ROOM IF YOU HAVE ANY OF THE FOLLOWING:      Difficulty breathing              Chills and/or fever over 101 F   Persistent vomiting and/or vomiting blood   Severe abdominal pain   Severe chest pain   Black, tarry stools   Bleeding- more than one  tablespoon   Any other symptom or condition that you feel may need urgent attention  Your doctor recommends these additional instructions:  If any biopsies were taken, your doctors clinic will contact you in 1 to 2   weeks with any results.  - Resume Xarelto (rivaroxaban) at prior dose tomorrow.  Refer to managing   physician for further adjustment of therapy.   - Discharge patient to home.   - Resume previous diet.   - Continue present medications.   - [facility]. No further plans at this   time from GI standpoint. Hb stable. Can consider push enteroscopy vs tagged   RBC scan if concern for ongoing blood loss.  For questions, problems or results please call your physician - Alexandru May MD at Work:  [phone].  [facility], EMERGENCY ROOM PHONE NUMBER: [phone]  IF A COMPLICATION OR EMERGENCY SITUATION ARISES AND YOU ARE UNABLE TO REACH   YOUR PHYSICIAN - GO DIRECTLY TO THE EMERGENCY ROOM.  Alexandru May MD  2/25/2024 11:03:47 AM  This report has been verified and signed electronically.  Dear patient,  As a result of recent federal legislation (The Federal Cures Act), you may   receive lab or pathology results from your procedure in your [facility]   account before your physician is able to contact you. Your physician or   their representative will relay the results to you with their   recommendations at their soonest availability.  Thank you,  [employer]

## 2024-02-25 NOTE — ASSESSMENT & PLAN NOTE
Pt has ? Myeloproliferative disorder  Details unknown seen by hem/on inpatient in 12/2023 about 2 months ago - no f/u since  Resume Xarelto at discharge per GI - Defer to Hem/Onc  Hopefully will see Dr. Virk on Monday - may be able to defer to outpatient

## 2024-02-25 NOTE — ANESTHESIA PREPROCEDURE EVALUATION
02/25/2024  Steff Johnson is a 64 y.o., female.      Pre-op Assessment    I have reviewed the Patient Summary Reports.     I have reviewed the Nursing Notes. I have reviewed the NPO Status.   I have reviewed the Medications.     Review of Systems  Anesthesia Hx:  No problems with previous Anesthesia   Neg history of prior surgery.          Denies Family Hx of Anesthesia complications.    Denies Personal Hx of Anesthesia complications.                    Social:  Non-Smoker, No Alcohol Use       Hematology/Oncology:  Hematology Normal   Oncology Normal                                   EENT/Dental:  EENT/Dental Normal           Cardiovascular:     Hypertension                                        Pulmonary:   COPD                     Renal/:  Chronic Renal Disease, CKD                Hepatic/GI:  Hepatic/GI Normal                 Musculoskeletal:  Arthritis               Neurological:   CVA (non verbal), residual symptoms Neuromuscular disease: CNV III palsy.   Seizures                                Endocrine:  Diabetes, type 2           Dermatological:  Skin Normal    Psych:  Psychiatric Normal                  Patient Active Problem List   Diagnosis    Hypertension associated with diabetes    COPD (chronic obstructive pulmonary disease)    Hyperglycemia due to type 2 diabetes mellitus    Proteinuria    Complete tear of left rotator cuff    Left shoulder pain    Shoulder stiffness, left    Shoulder weakness    Type 2 diabetes mellitus with diabetic nephropathy, HbA1c 8.9%    Mixed hyperlipidemia    Type 2 diabetes mellitus with both eyes affected by moderate nonproliferative retinopathy and macular edema, with long-term current use of insulin    CVA (cerebral vascular accident)    H/o Cranial nerve III palsy, left    Gait abnormality    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    Vitamin D deficiency     Acute right-sided weakness    Frequent falls    COVID-19 infection    Hypertensive emergency    Osteomyelitis of finger of left hand    Paronychia of finger, left    Ocular hypertension, bilateral    Combined forms of age-related cataract, bilateral    Hypertensive crisis    History of stroke    Cognitive communication deficit    Aphasia    Right sided weakness    At risk for falling    Impaired functional mobility, balance, gait, and endurance    Decreased strength    Decreased range of motion    Decreased independence with activities of daily living    Impaired functional mobility, balance, and endurance    Alteration in instrumental activities of daily living (IADL)    Dysarthria    Dysphonia    Thrombocytosis    Anemia    Pressure injury of sacral region, stage 4    Leucocytosis    Bacteremia due to Proteus species    Bedridden    Blisters of multiple sites    Chronic indwelling Mathur catheter    Acute on chronic anemia    UTI (urinary tract infection)    PEG (percutaneous endoscopic gastrostomy) status    Seizure disorder    Frequent hospital admissions       Past Surgical History:   Procedure Laterality Date    COLONOSCOPY N/A 4/11/2017    Procedure: COLONOSCOPY;  Surgeon: Jared Antonio MD;  Location: [facility];  Service: Endoscopy;  Laterality: N/A;    CYSTOSCOPY W/ RETROGRADES N/A 12/14/2023    Procedure: CYSTOSCOPY, WITH RETROGRADE PYELOGRAM;  Surgeon: Sergio Soria MD;  Location: [facility] OR;  Service: Urology;  Laterality: N/A;    ECHOCARDIOGRAM,TRANSESOPHAGEAL N/A 12/12/2023    Procedure: Transesophageal echo (GT) intra-procedure log documentation;  Surgeon: Antoine Rivera MD;  Location: [facility] CATH/EP LAB;  Service: Cardiology;  Laterality: N/A;    HYSTERECTOMY      INSERTION OF CATHETER N/A 12/14/2023    Procedure: INSERTION, CATHETER;  Surgeon: Sergio Soria MD;  Location: [facility] OR;  Service: Urology;  Laterality: N/A;    OOPHORECTOMY      SHOULDER SURGERY      R    TRANSESOPHAGEAL  ECHOCARDIOGRAM WITH POSSIBLE CARDIOVERSION (GT W/ POSS CARDIOVERSION) N/A 5/4/2023    Procedure: Transesophageal echo (GT) intra-procedure log documentation;  Surgeon: Blade Phillip MD;  Location: [facility] CATH[facility]EP LAB;  Service: Cardiology;  Laterality: N/A;        Tobacco Use:  The patient  reports that she has never smoked. She has never used smokeless tobacco.     Results for orders placed or performed during the hospital encounter of 02/23/24   EKG 12-lead    Collection Time: 02/23/24  2:49 PM   Result Value Ref Range    QRS Duration 66 ms    OHS QTC Calculation 464 ms    Narrative    Test Reason : R07.9,    Vent. Rate : 091 BPM     Atrial Rate : 091 BPM     P-R Int : 128 ms          QRS Dur : 066 ms      QT Int : 378 ms       P-R-T Axes : 061 008 063 degrees     QTc Int : 464 ms    Normal sinus rhythm  Inferior infarct ,age undetermined  Abnormal ECG  When compared with ECG of 11-DEC-2023 16:19,  No significant change was found  Confirmed by Marjan PALOMO, Blade NICHOLE ([id number]) on 2/24/2024 7:09:12 AM    Referred By: [email or web address]   SELF           Confirmed By:Blade Phillip MD        Imaging Results              X-Ray Chest AP Portable (Final result)  Result time 02/23/24 14:47:18      Final result by Heri Stanford MD (02/23/24 14:47:18)                   Narrative:    Chest single view    CLINICAL DATA: Chest pain    FINDINGS: Comparison to December 2023.    Heart size is normal. The mediastinum is unremarkable. No infiltrates or effusions are identified. There is linear scarring in the left midlung, unchanged. Osseous structures are unremarkable.    IMPRESSION:  1. No acute process were detrimental change compared to December 5.  2. Linear scarring in the left midlung.    Electronically signed by:  Heri Stanford MD  02/23/2024 02:47 PM CST Workstation: [id number]                                     Lab Results   Component Value Date    WBC 13.60 (H) 02/25/2024    HGB 8.2 (L) 02/25/2024    HCT 26.4 (L)  02/25/2024    MCV 83 02/25/2024     (H) 02/25/2024     BMP  Lab Results   Component Value Date     (H) 02/25/2024    K 3.9 02/25/2024     (H) 02/25/2024    CO2 20 (L) 02/25/2024    BUN 27 (H) 02/25/2024    CREATININE 1.0 02/25/2024    CALCIUM 8.9 02/25/2024    ANIONGAP 11 02/25/2024     (H) 02/25/2024     02/24/2024     (H) 02/23/2024       Results for orders placed during the hospital encounter of 12/05/23    Echo    Interpretation Summary    Left Ventricle: The left ventricle is normal in size. Normal wall thickness. Normal wall motion. There is normal systolic function with a visually estimated ejection fraction of 60 - 65%. There is normal diastolic function.    Right Ventricle: Normal right ventricular cavity size. Wall thickness is normal. Right ventricle wall motion  is normal. Systolic function is normal.    IVC/SVC: Normal venous pressure at 3 mmHg.           Physical Exam  General: Well nourished and Alert    Airway:  Mallampati: II   Mouth Opening: Normal  TM Distance: Normal  Tongue: Normal  Neck ROM: Normal ROM    Dental:  Intact    Chest/Lungs:  Clear to auscultation, Normal Respiratory Rate    Heart:  Rate: Normal  Rhythm: Regular Rhythm  Sounds: Normal        Anesthesia Plan  Type of Anesthesia, risks & benefits discussed:    Anesthesia Type: Gen Natural Airway  Intra-op Monitoring Plan: Standard ASA Monitors  Post Op Pain Control Plan:   (medical reason for not using multimodal pain management)  Induction:  IV  Informed Consent: Informed consent signed with the Patient and all parties understand the risks and agree with anesthesia plan.  All questions answered. Patient consented to blood products? Yes  ASA Score: 3    Ready For Surgery From Anesthesia Perspective.     .

## 2024-02-25 NOTE — PROGRESS NOTES
[facility] Medicine  Progress Note    Patient Name: Steff Johnson  MRN: [id number]  Patient Class: IP- Inpatient   Admission Date: 2/23/2024  Length of Stay: 1 days  Attending Physician: Octavio Brady Jr., MD  Primary Care Provider: Cristina Ren MD        Subjective:     Principal Problem:Acute on chronic anemia        HPI:  64 year old pt getting transferred from facility with H/o weakness/ Low H/H and bladimir  Pt is bed bound and aphasic and has History of frequent hospitalizations  Random labs were obtained in facility and pt was found to be anemic  Also reports of dark stools too  Transferred to ER and got admitted  Further History unavailable       Overview/Hospital Course:  64 y.o. F transferred from nursing facility with complaints of melena.  Patient had positive Hemoccult with initial hemoglobin of 7.7.  In early morning hemoglobin rechecked and found to be 6.2 repeat = 6.0.  Consent received from patient's family feel witnessed nursing + NP over the phone for blood transfusion. Wound care consulted (wounds), Hem/onc (acute on chronic thrombocytosis), GI (OB positive, hg 7.7-->6). Patient was transfused 1 unit PRBC and GI performed EGD with finding of significant for a single angioectasia in the duodenum that was treated with argon plasma coagulation. Recommended resuming Xarelto at prior dose tomorrow 12/26/23 and cleared patient from GI standpoint for discharge.    Patient's white count improving on IV zosyn from 22.38-->13.6. Will see Moose in am regarding bone marrow biopsy workup (inpatient vs outpatient). She was receiving wound care in the NH. Discharge planning back to NH is appropriate after recommendations from Dr. Virk for bone marrow biopsy. Per GI ok to resume Xarelto    INTERVAL HISTORY: non-verbal, contractures, decondition          Past Medical History:   Diagnosis Date    Arthritis     Complete tear of left rotator cuff 11/09/2017    COPD (chronic  obstructive pulmonary disease)     COVID-19 infection 07/02/2021    Diabetes mellitus     H/o Cerebrovascular accident (CVA) of left pontine structure 12/12/2019    Hypertension     Personal history of colonic polyps     Stroke     Wears glasses        Past Surgical History:   Procedure Laterality Date    COLONOSCOPY N/A 4/11/2017    Procedure: COLONOSCOPY;  Surgeon: Jared Antonio MD;  Location: [facility];  Service: Endoscopy;  Laterality: N/A;    CYSTOSCOPY W/ RETROGRADES N/A 12/14/2023    Procedure: CYSTOSCOPY, WITH RETROGRADE PYELOGRAM;  Surgeon: Sergio Soria MD;  Location: [facility] OR;  Service: Urology;  Laterality: N/A;    ECHOCARDIOGRAM,TRANSESOPHAGEAL N/A 12/12/2023    Procedure: Transesophageal echo (GT) intra-procedure log documentation;  Surgeon: Antoine Rivera MD;  Location: [facility] CATH/EP LAB;  Service: Cardiology;  Laterality: N/A;    HYSTERECTOMY      INSERTION OF CATHETER N/A 12/14/2023    Procedure: INSERTION, CATHETER;  Surgeon: Sergio Soria MD;  Location: [facility] OR;  Service: Urology;  Laterality: N/A;    OOPHORECTOMY      SHOULDER SURGERY      R    TRANSESOPHAGEAL ECHOCARDIOGRAM WITH POSSIBLE CARDIOVERSION (GT W/ POSS CARDIOVERSION) N/A 5/4/2023    Procedure: Transesophageal echo (GT) intra-procedure log documentation;  Surgeon: Blade Phillip MD;  Location: [facility] CATH/EP LAB;  Service: Cardiology;  Laterality: N/A;       Review of patient's allergies indicates:  No Known Allergies    No current facility-administered medications on file prior to encounter.     Current Outpatient Medications on File Prior to Encounter   Medication Sig    allopurinoL (ZYLOPRIM) 100 MG tablet 100 mg by Per G Tube route once daily.    amLODIPine (NORVASC) 10 MG tablet Take 1 tablet (10 mg total) by mouth once daily. (Patient taking differently: 10 mg by Per G Tube route once daily.)    apixaban (ELIQUIS) 2.5 mg Tab 2.5 mg by Per G Tube route 2 (two) times daily.    ascorbic acid, vitamin C,  (VITAMIN C) 500 MG tablet 500 mg by Per G Tube route 2 (two) times daily.    aspirin 81 MG Chew Take 1 tablet (81 mg total) by mouth once daily. (Patient taking differently: 81 mg by Per G Tube route once daily.)    atorvastatin (LIPITOR) 80 MG tablet Take 1 tablet (80 mg total) by mouth every evening. (Patient taking differently: 80 mg by Per G Tube route every evening.)    folic acid (FOLVITE) 1 MG tablet 1 tablet by Per G Tube route every morning.    furosemide (LASIX) 40 MG tablet 40 mg by Per G Tube route once daily.    HUMALOG KWIKPEN INSULIN 100 unit/mL pen Inject 0-20 Units into the skin As instructed (inject 4 times daily per sliding scale). 0 -149 = 0 units  150 - 199 = 4 units  200 - 249 = 8 units  250 - 299 = 12 units  300 - 349 = 16 units  350 - 1000 = 20 units Call MD    insulin (LANTUS SOLOSTAR U-100 INSULIN) glargine 100 units/mL SubQ pen Inject 35 Units into the skin once daily.    insulin lispro 100 unit/mL injection Inject 2 Units into the skin 3 (three) times daily before meals. 07:30  11:30  16:30    latanoprost 0.005 % ophthalmic solution Place 1 drop into both eyes once daily. (Patient taking differently: Place 1 drop into both eyes every evening.)    levETIRAcetam (KEPPRA) 100 mg/mL Soln 500 mg by Per G Tube route 2 (two) times daily.    losartan (COZAAR) 100 MG tablet Take 1 tablet (100 mg total) by mouth once daily. (Patient taking differently: 100 mg by Per G Tube route once daily.)    magnesium oxide (MAG-OX) 400 mg (241.3 mg magnesium) tablet 400 mg by Per G Tube route once daily.    metFORMIN (GLUCOPHAGE) 500 MG tablet 1,000 mg by Per G Tube route 2 (two) times daily with meals.    metoclopramide HCl (REGLAN) 5 mg/5 mL Soln 10 mg by Per G Tube route 3 (three) times daily.    metoprolol tartrate (LOPRESSOR) 25 MG tablet 25 mg by Per G Tube route 2 (two) times daily.    multivitamin (THERAGRAN) per tablet 1 tablet by Per G Tube route once daily.    omeprazole (PRILOSEC) 20 MG capsule Take  20 mg by mouth once daily. Per G-Tube    polyethylene glycol (GLYCOLAX) 17 gram PwPk 17 g by Per G Tube route once daily.    thiamine (VITAMIN B-1) 100 MG tablet 100 mg by Per G Tube route once daily.    zinc sulfate (ZINC-220 ORAL) 1 tablet by PEG Tube route Daily.    baclofen (LIORESAL) 5 mg Tab tablet 5 mg by Per G Tube route every 8 (eight) hours as needed (muscle spasms).    cloNIDine (CATAPRES) 0.1 MG tablet Take 1 tablet (0.1 mg total) by mouth 3 (three) times daily. (Patient taking differently: Take 0.1 mg by mouth 4 (four) times daily as needed (For systolic over 160).)    tiotropium (SPIRIVA) 18 mcg inhalation capsule Inhale 1 capsule (18 mcg total) into the lungs once daily. Controller    [DISCONTINUED] blood-glucose meter (TRUE METRIX GLUCOSE METER) kit Use as instructed    [DISCONTINUED] lancing device (TRUEDRAW LANCING DEVICE) Misc Inject 1 Device into the skin 2 (two) times daily.     Family History       Problem Relation (Age of Onset)    Anemia Daughter    Asthma Mother    Diabetes Mother, Father, Brother    Hypertension Mother, Brother          Tobacco Use    Smoking status: Never    Smokeless tobacco: Never   Substance and Sexual Activity    Alcohol use: No    Drug use: No    Sexual activity: Not Currently     Review of Systems   Unable to perform ROS: Patient nonverbal   Constitutional:  Positive for activity change and appetite change.   Gastrointestinal:         Tube feeding   Psychiatric/Behavioral:  Positive for confusion.         Nonverbal     Objective:     Vital Signs (Most Recent):  Temp: 96.1 °F (35.6 °C) (02/25/24 1138)  Pulse: 73 (02/25/24 1138)  Resp: 20 (02/25/24 1138)  BP: (!) 159/84 (02/25/24 1138)  SpO2: 99 % (02/25/24 1138) Vital Signs (24h Range):  Temp:  [96.1 °F (35.6 °C)-99.4 °F (37.4 °C)] 96.1 °F (35.6 °C)  Pulse:  [69-82] 73  Resp:  [16-20] 20  SpO2:  [97 %-100 %] 99 %  BP: (132-173)/(61-91) 159/84     Weight: 54.2 kg (119 lb 7.8 oz)  Body mass index is 24.13 kg/m².      Physical Exam  Vitals and nursing note reviewed.   Constitutional:       General: She is not in acute distress.     Appearance: Normal appearance.      Comments: Body mass index is 24.13 kg/m².   HENT:      Right Ear: External ear normal.      Left Ear: External ear normal.      Nose: Nose normal.      Mouth/Throat:      Mouth: Mucous membranes are dry.   Cardiovascular:      Rate and Rhythm: Normal rate and regular rhythm.   Pulmonary:      Effort: Pulmonary effort is normal.   Abdominal:      Palpations: Abdomen is soft.   Musculoskeletal:         General: Deformity present.      Right lower leg: No edema.      Left lower leg: No edema.   Skin:     General: Skin is warm.   Neurological:      Mental Status: Mental status is at baseline.      Comments: nonverbal                                  Significant Labs: All pertinent labs within the past 24 hours have been reviewed.  CBC:   Recent Labs   Lab 02/23/24  1411 02/24/24  0607 02/24/24  0711 02/25/24  0026 02/25/24  0540 02/25/24  0815   WBC 22.38* 17.98*  --   --  13.60*  --    HGB 7.7* 6.2*   < > 8.0* 8.1* 8.2*   HCT 24.8* 20.7*   < > 25.3* 25.4* 26.4*   * 624*  --   --  637*  --     < > = values in this interval not displayed.       CMP:   Recent Labs   Lab 02/23/24  1411 02/24/24  0607 02/25/24  0540   * 148* 148*   K 4.6 4.0 3.9    115* 117*   CO2 29 25 20*   * 106 123*   BUN 61* 45* 27*   CREATININE 1.4 1.1 1.0   CALCIUM 9.7 8.8 8.9   PROT 8.7* 7.2 7.3   ALBUMIN 3.4* 2.8* 2.9*   BILITOT 0.4 0.4 0.4   ALKPHOS 98 70 80   AST 16 13 14   ALT 17 12 10   ANIONGAP 9 8 11         Significant Imaging: I have reviewed all pertinent imaging results/findings within the past 24 hours.    Assessment/Plan:      * Acute on chronic anemia  Hgb stable 8.2  GI pathway  Serial H/H  Await Hem/onc opinion - lost to follow up  Iv PPI    Leucocytosis  WBC higher than baseline  Improving = 13 lower than has been for a while  Continue iv Zosyn for  suspected UTI  Wounds vs urine vs blood  Urine cultures yeast less than 99K  Blood cultures - NGTD  waiting wound consult     Latest Reference Range & Units 12/14/23 05:00 12/15/23 05:51 12/16/23 05:07 12/17/23 04:57 12/18/23 05:03 12/19/23 04:09 02/23/24 14:11   WBC 3.90 - 12.70 K/uL 18.11 (H) 18.90 (H) 17.53 (H) 19.72 (H) 17.48 (H) 15.27 (H) 22.38 (H)   (H): Data is abnormally high      Thrombocytosis  Pt has ? Myeloproliferative disorder  Details unknown seen by hem/on inpatient in 12/2023 about 2 months ago - no f/u since  Resume Xarelto at discharge per GI - Defer to Hem/Onc  Hopefully will see Dr. Virk on Monday - may be able to defer to outpatient    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  Creatine stable for now and GFR nomalized. BMP reviewed- noted Estimated Creatinine Clearance: 42.6 mL/min (based on SCr of 1 mg/dL). according to latest data. Based on current GFR, CKD stage is stage 3 - GFR 30-59.  Monitor UOP and serial BMP and adjust therapy as needed. Renally dose meds. Avoid nephrotoxic medications and procedures.    Pressure injury of sacral region, stage 4  Chronic issue  Wound care team consulted   Continue wound care at NH      Frequent hospital admissions  Ongoing issue  Ideally should be under hospice care       Seizure disorder  Maintain keppra PO      PEG (percutaneous endoscopic gastrostomy) status  Aware f.         UTI (urinary tract infection)  Urine culture and started on abx       Chronic indwelling Mathur catheter  Aware       Bedridden  Aware       Impaired functional mobility, balance, gait, and endurance  Aware       Cognitive communication deficit  Aware       Type 2 diabetes mellitus with both eyes affected by moderate nonproliferative retinopathy and macular edema, with long-term current use of insulin  Last A1c reviewed-   Lab Results   Component Value Date    HGBA1C 7.8 (H) 12/05/2023     Hold home oral hyperglycemics if indicated - while hospitalized will use combined insulin  therapy with basal and prandial insulin coverage, POCT glucose checks, hypoglycemic protocol and moderate correction scale - repeat HgA1c if no record within last 90 days.        VTE Risk Mitigation (From admission, onward)           Ordered     Reason for No Pharmacological VTE Prophylaxis  Once        Question:  Reasons:  Answer:  Risk of Bleeding    02/25/24 0545     Place PAUL hose  Until discontinued         02/24/24 1447     Place sequential compression device  Until discontinued         02/24/24 1447     IP VTE HIGH RISK PATIENT  Once         02/23/24 1532                    Discharge Planning   ALEX:      Code Status: Full Code   Is the patient medically ready for discharge?:     Reason for patient still in hospital (select all that apply): Patient trending condition, Treatment, and Consult recommendations  Discharge Plan A: New Nursing Home placement - [facility] care facility              Adeline Rosas, BRY, APRN, FNP-C  [facility]  [facility]  [email or web address]

## 2024-02-25 NOTE — NURSING
Notified Dr. Felton to clarify on blood transfusion. Order noted to transfuse 1 unit of blood after she received 1 unit earlier today. Post transfusion H&H was 8.3. Dr. Felton ordered to hold transfusion at this time

## 2024-02-26 PROBLEM — R53.2 FUNCTIONAL QUADRIPLEGIA: Status: ACTIVE | Noted: 2023-12-07

## 2024-02-26 LAB
ALBUMIN SERPL BCP-MCNC: 2.9 G/DL (ref 3.5–5.2)
ALP SERPL-CCNC: 90 U/L (ref 55–135)
ALT SERPL W/O P-5'-P-CCNC: 14 U/L (ref 10–44)
ANION GAP SERPL CALC-SCNC: 8 MMOL/L (ref 8–16)
AST SERPL-CCNC: 19 U/L (ref 10–40)
BASOPHILS # BLD AUTO: 0.05 K/UL (ref 0–0.2)
BASOPHILS NFR BLD: 0.4 % (ref 0–1.9)
BILIRUB SERPL-MCNC: 0.3 MG/DL (ref 0.1–1)
BLD PROD TYP BPU: NORMAL
BLOOD UNIT EXPIRATION DATE: NORMAL
BLOOD UNIT TYPE CODE: 9500
BLOOD UNIT TYPE: NORMAL
BUN SERPL-MCNC: 22 MG/DL (ref 8–23)
CALCIUM SERPL-MCNC: 8.7 MG/DL (ref 8.7–10.5)
CHLORIDE SERPL-SCNC: 118 MMOL/L (ref 95–110)
CO2 SERPL-SCNC: 22 MMOL/L (ref 23–29)
CODING SYSTEM: NORMAL
CREAT SERPL-MCNC: 1 MG/DL (ref 0.5–1.4)
CROSSMATCH INTERPRETATION: NORMAL
DIFFERENTIAL METHOD BLD: ABNORMAL
DISPENSE STATUS: NORMAL
EOSINOPHIL # BLD AUTO: 0.2 K/UL (ref 0–0.5)
EOSINOPHIL NFR BLD: 1.7 % (ref 0–8)
ERYTHROCYTE [DISTWIDTH] IN BLOOD BY AUTOMATED COUNT: 20.3 % (ref 11.5–14.5)
EST. GFR  (NO RACE VARIABLE): >60 ML/MIN/1.73 M^2
GLUCOSE SERPL-MCNC: 249 MG/DL (ref 70–110)
GLUCOSE SERPL-MCNC: 277 MG/DL (ref 70–110)
GLUCOSE SERPL-MCNC: 278 MG/DL (ref 70–110)
GLUCOSE SERPL-MCNC: 329 MG/DL (ref 70–110)
HCT VFR BLD AUTO: 24.2 % (ref 37–48.5)
HGB BLD-MCNC: 7.7 G/DL (ref 12–16)
IMM GRANULOCYTES # BLD AUTO: 0.15 K/UL (ref 0–0.04)
IMM GRANULOCYTES NFR BLD AUTO: 1.1 % (ref 0–0.5)
LYMPHOCYTES # BLD AUTO: 1.8 K/UL (ref 1–4.8)
LYMPHOCYTES NFR BLD: 13.2 % (ref 18–48)
MAGNESIUM SERPL-MCNC: 1.9 MG/DL (ref 1.6–2.6)
MCH RBC QN AUTO: 26.2 PG (ref 27–31)
MCHC RBC AUTO-ENTMCNC: 31.8 G/DL (ref 32–36)
MCV RBC AUTO: 82 FL (ref 82–98)
MONOCYTES # BLD AUTO: 1.6 K/UL (ref 0.3–1)
MONOCYTES NFR BLD: 11.7 % (ref 4–15)
NEUTROPHILS # BLD AUTO: 9.6 K/UL (ref 1.8–7.7)
NEUTROPHILS NFR BLD: 71.9 % (ref 38–73)
NRBC BLD-RTO: 0 /100 WBC
NUM UNITS TRANS PACKED RBC: NORMAL
PLATELET # BLD AUTO: 650 K/UL (ref 150–450)
PMV BLD AUTO: 10.1 FL (ref 9.2–12.9)
POTASSIUM SERPL-SCNC: 3.7 MMOL/L (ref 3.5–5.1)
PROT SERPL-MCNC: 7.1 G/DL (ref 6–8.4)
RBC # BLD AUTO: 2.94 M/UL (ref 4–5.4)
SODIUM SERPL-SCNC: 148 MMOL/L (ref 136–145)
WBC # BLD AUTO: 13.3 K/UL (ref 3.9–12.7)

## 2024-02-26 PROCEDURE — 63600175 PHARM REV CODE 636 W HCPCS: Performed by: INTERNAL MEDICINE

## 2024-02-26 PROCEDURE — 25000003 PHARM REV CODE 250: Performed by: INTERNAL MEDICINE

## 2024-02-26 PROCEDURE — 25000003 PHARM REV CODE 250: Performed by: NURSE PRACTITIONER

## 2024-02-26 PROCEDURE — 80053 COMPREHEN METABOLIC PANEL: CPT | Performed by: NURSE PRACTITIONER

## 2024-02-26 PROCEDURE — 85025 COMPLETE CBC W/AUTO DIFF WBC: CPT | Performed by: NURSE PRACTITIONER

## 2024-02-26 PROCEDURE — 21400001 HC TELEMETRY ROOM

## 2024-02-26 PROCEDURE — 63600175 PHARM REV CODE 636 W HCPCS: Performed by: NURSE PRACTITIONER

## 2024-02-26 PROCEDURE — 83735 ASSAY OF MAGNESIUM: CPT | Performed by: INTERNAL MEDICINE

## 2024-02-26 PROCEDURE — 99233 SBSQ HOSP IP/OBS HIGH 50: CPT | Mod: ,,, | Performed by: INTERNAL MEDICINE

## 2024-02-26 PROCEDURE — 36415 COLL VENOUS BLD VENIPUNCTURE: CPT | Performed by: INTERNAL MEDICINE

## 2024-02-26 PROCEDURE — 99221 1ST HOSP IP/OBS SF/LOW 40: CPT | Mod: ,,, | Performed by: FAMILY MEDICINE

## 2024-02-26 PROCEDURE — C9113 INJ PANTOPRAZOLE SODIUM, VIA: HCPCS | Performed by: NURSE PRACTITIONER

## 2024-02-26 RX ORDER — LOSARTAN POTASSIUM 50 MG/1
50 TABLET ORAL DAILY
Status: DISCONTINUED | OUTPATIENT
Start: 2024-02-26 | End: 2024-02-29 | Stop reason: HOSPADM

## 2024-02-26 RX ADMIN — PANTOPRAZOLE SODIUM 40 MG: 40 INJECTION, POWDER, FOR SOLUTION INTRAVENOUS at 08:02

## 2024-02-26 RX ADMIN — POTASSIUM BICARBONATE 50 MEQ: 977.5 TABLET, EFFERVESCENT ORAL at 07:02

## 2024-02-26 RX ADMIN — PIPERACILLIN SODIUM AND TAZOBACTAM SODIUM 3.38 G: 3; .375 INJECTION, POWDER, LYOPHILIZED, FOR SOLUTION INTRAVENOUS at 01:02

## 2024-02-26 RX ADMIN — PANTOPRAZOLE SODIUM 40 MG: 40 INJECTION, POWDER, FOR SOLUTION INTRAVENOUS at 09:02

## 2024-02-26 RX ADMIN — METOPROLOL TARTRATE 25 MG: 25 TABLET, FILM COATED ORAL at 08:02

## 2024-02-26 RX ADMIN — ACETAMINOPHEN 1000 MG: 500 TABLET ORAL at 08:02

## 2024-02-26 RX ADMIN — Medication 400 MG: at 09:02

## 2024-02-26 RX ADMIN — POLYETHYLENE GLYCOL 3350 17 G: 17 POWDER, FOR SOLUTION ORAL at 09:02

## 2024-02-26 RX ADMIN — LOSARTAN POTASSIUM 50 MG: 50 TABLET, FILM COATED ORAL at 04:02

## 2024-02-26 RX ADMIN — MUPIROCIN 1 G: 20 OINTMENT TOPICAL at 08:02

## 2024-02-26 RX ADMIN — METOPROLOL TARTRATE 25 MG: 25 TABLET, FILM COATED ORAL at 09:02

## 2024-02-26 RX ADMIN — INSULIN ASPART 6 UNITS: 100 INJECTION, SOLUTION INTRAVENOUS; SUBCUTANEOUS at 04:02

## 2024-02-26 RX ADMIN — INSULIN ASPART 2 UNITS: 100 INJECTION, SOLUTION INTRAVENOUS; SUBCUTANEOUS at 08:02

## 2024-02-26 RX ADMIN — INSULIN DETEMIR 10 UNITS: 100 INJECTION, SOLUTION SUBCUTANEOUS at 09:02

## 2024-02-26 RX ADMIN — LEVETIRACETAM 500 MG: 100 SOLUTION ORAL at 09:02

## 2024-02-26 RX ADMIN — LEVETIRACETAM 500 MG: 100 SOLUTION ORAL at 08:02

## 2024-02-26 RX ADMIN — MUPIROCIN 1 G: 20 OINTMENT TOPICAL at 09:02

## 2024-02-26 RX ADMIN — PIPERACILLIN SODIUM AND TAZOBACTAM SODIUM 3.38 G: 3; .375 INJECTION, POWDER, LYOPHILIZED, FOR SOLUTION INTRAVENOUS at 08:02

## 2024-02-26 RX ADMIN — PIPERACILLIN SODIUM AND TAZOBACTAM SODIUM 3.38 G: 3; .375 INJECTION, POWDER, LYOPHILIZED, FOR SOLUTION INTRAVENOUS at 04:02

## 2024-02-26 RX ADMIN — INSULIN ASPART 8 UNITS: 100 INJECTION, SOLUTION INTRAVENOUS; SUBCUTANEOUS at 09:02

## 2024-02-26 NOTE — PLAN OF CARE
Problem: Feeding Intolerance (Enteral Nutrition)  Goal: Feeding Tolerance  Outcome: Ongoing, Progressing  Intervention: Prevent and Manage Feeding Intolerance  Flowsheets (Taken 2/26/2024 1140)  Nutrition Support Management: (Increased FWF's due to hypernatremia) other (see comments)

## 2024-02-26 NOTE — PLAN OF CARE
MANNY contacted [facility] this date and spoke with Nick.  Discussed family concerns about placement at [facility] and requested that someone from facility contact daughter to address concerns.  Nick to request that JEN or MANNY contact daughter some time today.

## 2024-02-26 NOTE — SUBJECTIVE & OBJECTIVE
Interval History:  Patient seen and examined.  Notes reviewed.  Low-grade temp overnight.  Leukocytosis has improved H/H stable.  Awaiting further recommendations from Hematology/Oncology.    Review of Systems   Unable to perform ROS: Patient nonverbal     Objective:     Vital Signs (Most Recent):  Temp: 98.1 °F (36.7 °C) (02/26/24 1100)  Pulse: 64 (02/26/24 1100)  Resp: 16 (02/26/24 0935)  BP: (!) 165/77 (02/26/24 1100)  SpO2: 100 % (02/26/24 1100) Vital Signs (24h Range):  Temp:  [96.3 °F (35.7 °C)-100.1 °F (37.8 °C)] 98.1 °F (36.7 °C)  Pulse:  [64-80] 64  Resp:  [16-20] 16  SpO2:  [98 %-100 %] 100 %  BP: (141-178)/(67-80) 165/77     Weight: 54.2 kg (119 lb 7.8 oz)  Body mass index is 24.13 kg/m².    Intake/Output Summary (Last 24 hours) at 2/26/2024 1348  Last data filed at 2/26/2024 1100  Gross per 24 hour   Intake 723 ml   Output 700 ml   Net 23 ml         Physical Exam  Constitutional:       Appearance: Ill appearance: Appears chronically ill.   Cardiovascular:      Rate and Rhythm: Normal rate and regular rhythm.      Pulses: Normal pulses.      Heart sounds: Normal heart sounds.   Pulmonary:      Effort: Pulmonary effort is normal. No respiratory distress.      Breath sounds: Normal breath sounds.   Abdominal:      General: Bowel sounds are normal.      Palpations: Abdomen is soft.   Neurological:      Mental Status: She is alert.             Significant Labs: All pertinent labs within the past 24 hours have been reviewed.  CBC:   Recent Labs   Lab 02/25/24  0540 02/25/24  0815 02/25/24  1600 02/26/24  0417   WBC 13.60*  --   --  13.30*   HGB 8.1* 8.2* 8.3* 7.7*   HCT 25.4* 26.4* 26.4* 24.2*   *  --   --  650*     CMP:   Recent Labs   Lab 02/25/24  0540 02/26/24  0417   * 148*   K 3.9 3.7   * 118*   CO2 20* 22*   * 277*   BUN 27* 22   CREATININE 1.0 1.0   CALCIUM 8.9 8.7   PROT 7.3 7.1   ALBUMIN 2.9* 2.9*   BILITOT 0.4 0.3   ALKPHOS 80 90   AST 14 19   ALT 10 14   ANIONGAP 11 8        Significant Imaging: I have reviewed all pertinent imaging results/findings within the past 24 hours.

## 2024-02-26 NOTE — MEDICAL/APP STUDENT
[facility] Medicine  Progress Note    Patient Name: Steff Johnson  MRN: [id number]  Patient Class: IP- Inpatient   Admission Date: 2/23/2024  Length of Stay: 2 days  Attending Physician: Octavio Brady Jr., MD  Primary Care Provider: Cristina Ren MD        Subjective:     Principal Problem:Acute on chronic anemia    HPI: 65 y/o female w/ past medical history of T2DM, CKD, Seizure disorder, stroke, COPD, HTN, and anemia presented from a facility with a hx of weakness, low H/H, and melena. Patient is bed bound, aphasic, and has history of frequent hospitalizations. Labs were obtained in facility and pt was found to be anemic w/ dark stools. Patient was then transferred to the ER and was then admitted to hospital medicine for further evaluation and management.    Overview/Hospital Course: 65 y/o female transferred from a nursing facility with complaints of melena. Patient had positive hemeoccult stool with initial hemoglobin of 7.7. In the early morning, hemoglobin was rechecked and found to be 6.2, repeat was 6.0. Consent received from patient's family, witnessed nursing and NP over the phone for blood transfusion. Consulted wound care (for multiple wounds), Heme/Onc (for acute on chronic thrombocytosis), and GI (OB positive, hgb 7.7 to 6). Patient was transfused 1 unit of PRBC and GI performed EGD with findings significant for a single angioectasia in the duodenum that was treated with argon plasma coagulation. Recommended resuming Xarelto at prior dose today (02/26/24) and cleared patient from GI standpoint for discharge.    Patient's white count improving on IV zosyn from 22.38 to 13.6. Will see Moose this morning regarding bone marrow biopsy workup (inpatient vs outpatient). She was receiving wound care in the NH. Discharge planning back to NH is appropriate after recommendations from Dr. Virk for bone marrow biopsy. Per GI ok to resume Xarelto.    Interval History:  Patient is non-verbal, contracted.    Review of Systems   Reason unable to perform ROS: HUSSAIN, patient is nonverbal.     Objective:     Vital Signs (Most Recent):  Temp: 99.5 °F (37.5 °C) (02/26/24 0935)  Pulse: 73 (02/26/24 0935)  Resp: 16 (02/26/24 0935)  BP: (!) 178/79 (02/26/24 0935)  SpO2: 99 % (02/26/24 0935) Vital Signs (24h Range):  Temp:  [96.3 °F (35.7 °C)-100.1 °F (37.8 °C)] 99.5 °F (37.5 °C)  Pulse:  [72-80] 73  Resp:  [16-20] 16  SpO2:  [98 %-100 %] 99 %  BP: (141-178)/(67-80) 178/79     Weight: 54.2 kg (119 lb 7.8 oz)  Body mass index is 24.13 kg/m².    Intake/Output Summary (Last 24 hours) at 2/26/2024 1233  Last data filed at 2/26/2024 0647  Gross per 24 hour   Intake 593 ml   Output 700 ml   Net -107 ml      Physical Exam  HENT:      Head: Normocephalic and atraumatic.   Cardiovascular:      Rate and Rhythm: Normal rate and regular rhythm.   Pulmonary:      Effort: Pulmonary effort is normal.      Breath sounds: Normal breath sounds.      Comments: On 2L of O2 via nasal cannula  Abdominal:      General: Abdomen is flat.      Palpations: Abdomen is soft.      Comments: PEG tube   Neurological:      Mental Status: Mental status is at baseline.         Significant Labs: All pertinent labs within the past 24 hours have been reviewed.  A1C:   Recent Labs   Lab 12/05/23  2134   HGBA1C 7.8*     CBC:   Recent Labs   Lab 02/25/24  0540 02/25/24  0815 02/25/24  1600 02/26/24  0417   WBC 13.60*  --   --  13.30*   HGB 8.1* 8.2* 8.3* 7.7*   HCT 25.4* 26.4* 26.4* 24.2*   *  --   --  650*     CMP:   Recent Labs   Lab 02/25/24  0540 02/26/24 0417   * 148*   K 3.9 3.7   * 118*   CO2 20* 22*   * 277*   BUN 27* 22   CREATININE 1.0 1.0   CALCIUM 8.9 8.7   PROT 7.3 7.1   ALBUMIN 2.9* 2.9*   BILITOT 0.4 0.3   ALKPHOS 80 90   AST 14 19   ALT 10 14   ANIONGAP 11 8       Significant Imaging: I have reviewed all pertinent imaging results/findings within the past 24 hours.    Assessment/Plan:       Active Diagnoses:    Diagnosis Date Noted POA    PRINCIPAL PROBLEM:  Acute on chronic anemia [D64.9] 02/23/2024 Yes    UTI (urinary tract infection) [N39.0] 02/23/2024 Yes    PEG (percutaneous endoscopic gastrostomy) status [Z93.1] 02/23/2024 Not Applicable    Seizure disorder [G40.909] 02/23/2024 Yes    Frequent hospital admissions [Z78.9] 02/23/2024 Yes    Bedridden [Z74.01] 12/07/2023 Not Applicable    Chronic indwelling Mathur catheter [Z97.8] 12/07/2023 Not Applicable    Leucocytosis [D72.829] 12/06/2023 Yes    Pressure injury of sacral region, stage 4 [L89.154] 10/25/2023 Yes    Thrombocytosis [D75.839] 10/25/2023 Yes    Cognitive communication deficit [R41.841] 05/31/2023 Yes    Impaired functional mobility, balance, gait, and endurance [Z74.09] 05/31/2023 Yes    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 [N18.30] 09/26/2020 Yes    Type 2 diabetes mellitus with both eyes affected by moderate nonproliferative retinopathy and macular edema, with long-term current use of insulin [E11.3313, Z79.4] 08/07/2019 Not Applicable      Problems Resolved During this Admission:     Acute on Chronic Anemia  - Hemoglobin down to 7.7 from 8.3 yesterday  - Hematocrit down to 24.2 from 26.4 yesterday  - Occult blood in stool positive  - Heme/Onc following  - GI okayed for discharge  - serial H/H  - IV PPI    UTI (Urinary Tract Infection)  - UA with hazy appearance, 2+ leukocytes  - urine culture with yeast growth  - continue IV zosyn    PEG (Percutaneous endoscopic gastrostomy) status  - noted    Seizure disorder  - Keppra for prophylaxis    Bedridden  - noted    Chronic Indwelling Mathur Catheter  - noted    Leukocytosis  - white count down trending from 22.3 on admit, 17.98, 13.6, and 13.3 today  - continue IV zosyn  - urine culture with yeast  - blood culture no growth to date    Pressure injury of sacral region, stage 4  - Chronic  - Wound care following    Thrombocytosis  - platelet count at 650  - resume Xarelto at discharge  per GI - defer to Heme/Onc    Cognitive communication deficit  - noted    Impaired functional mobility, balance, gait, and endurance  - noted    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  - chronic, stable  - GFR > 60, Cr 1.0  - avoid nephrotoxic agents  - renally dose meds    Type 2 diabetes mellitus with both eyes affected by moderate nonproliferative retinopathy and macular edema, with long-term current use of insulin  - chronic, A1c 7.8, POCT glucose 329  - continue to monitor  - continue insulin sliding scale    History of Cerebrovascular accident  - chronic, stable    Essential Hypertension  - chronic, uncontrolled - systolic ranging from 141-178, diastolic ranging from 67-80  - continue metoprolol  - possibly add a thiazide diuretic or ACE inhibitor        VTE Risk Mitigation (From admission, onward)           Ordered     Reason for No Pharmacological VTE Prophylaxis  Once        Question:  Reasons:  Answer:  Risk of Bleeding    02/25/24 0545     Place PAUL hose  Until discontinued         02/24/24 1447     Place sequential compression device  Until discontinued         02/24/24 1447     IP VTE HIGH RISK PATIENT  Once         02/23/24 1532                       SHAR JulianS  Department of Hospital Medicine   [facility]

## 2024-02-26 NOTE — PROGRESS NOTES
[facility] Medicine  Progress Note    Patient Name: Steff Johnson  MRN: [id number]  Patient Class: IP- Inpatient   Admission Date: 2/23/2024  Length of Stay: 2 days  Attending Physician: Octavio Brady Jr., MD  Primary Care Provider: Cristina Ren MD        Subjective:     Principal Problem:Acute on chronic anemia        HPI:  64 year old pt getting transferred from facility with H/o weakness/ Low H/H and bladimir  Pt is bed bound and aphasic and has History of frequent hospitalizations  Random labs were obtained in facility and pt was found to be anemic  Also reports of dark stools too  Transferred to ER and got admitted  Further History unavailable       Overview/Hospital Course:  64 y.o. F transferred from nursing facility with complaints of melena.  Patient had positive Hemoccult with initial hemoglobin of 7.7.  In early morning hemoglobin rechecked and found to be 6.2 repeat = 6.0.  Consent received from patient's family feel witnessed nursing + NP over the phone for blood transfusion. Wound care consulted (wounds), Hem/onc (acute on chronic thrombocytosis), GI (OB positive, hg 7.7-->6). Patient was transfused 1 unit PRBC and GI performed EGD with finding of significant for a single angioectasia in the duodenum that was treated with argon plasma coagulation. Recommended resuming Xarelto at prior dose tomorrow 12/26/23 and cleared patient from GI standpoint for discharge.    Patient's white count improving on IV zosyn from 22.38-->13.6. Will see Moose in am regarding bone marrow biopsy workup (inpatient vs outpatient). She was receiving wound care in the NH. Discharge planning back to NH is appropriate after recommendations from Dr. Virk for bone marrow biopsy. Per GI ok to resume Xarelto    Interval History:  Patient seen and examined.  Notes reviewed.  Low-grade temp overnight.  Leukocytosis has improved H/H stable.  Awaiting further recommendations from  Hematology/Oncology.    Review of Systems   Unable to perform ROS: Patient nonverbal     Objective:     Vital Signs (Most Recent):  Temp: 98.1 °F (36.7 °C) (02/26/24 1100)  Pulse: 64 (02/26/24 1100)  Resp: 16 (02/26/24 0935)  BP: (!) 165/77 (02/26/24 1100)  SpO2: 100 % (02/26/24 1100) Vital Signs (24h Range):  Temp:  [96.3 °F (35.7 °C)-100.1 °F (37.8 °C)] 98.1 °F (36.7 °C)  Pulse:  [64-80] 64  Resp:  [16-20] 16  SpO2:  [98 %-100 %] 100 %  BP: (141-178)/(67-80) 165/77     Weight: 54.2 kg (119 lb 7.8 oz)  Body mass index is 24.13 kg/m².    Intake/Output Summary (Last 24 hours) at 2/26/2024 1348  Last data filed at 2/26/2024 1100  Gross per 24 hour   Intake 723 ml   Output 700 ml   Net 23 ml         Physical Exam  Constitutional:       Appearance: Ill appearance: Appears chronically ill.   Cardiovascular:      Rate and Rhythm: Normal rate and regular rhythm.      Pulses: Normal pulses.      Heart sounds: Normal heart sounds.   Pulmonary:      Effort: Pulmonary effort is normal. No respiratory distress.      Breath sounds: Normal breath sounds.   Abdominal:      General: Bowel sounds are normal.      Palpations: Abdomen is soft.   Neurological:      Mental Status: She is alert.             Significant Labs: All pertinent labs within the past 24 hours have been reviewed.  CBC:   Recent Labs   Lab 02/25/24  0540 02/25/24  0815 02/25/24  1600 02/26/24  0417   WBC 13.60*  --   --  13.30*   HGB 8.1* 8.2* 8.3* 7.7*   HCT 25.4* 26.4* 26.4* 24.2*   *  --   --  650*     CMP:   Recent Labs   Lab 02/25/24  0540 02/26/24  0417   * 148*   K 3.9 3.7   * 118*   CO2 20* 22*   * 277*   BUN 27* 22   CREATININE 1.0 1.0   CALCIUM 8.9 8.7   PROT 7.3 7.1   ALBUMIN 2.9* 2.9*   BILITOT 0.4 0.3   ALKPHOS 80 90   AST 14 19   ALT 10 14   ANIONGAP 11 8       Significant Imaging: I have reviewed all pertinent imaging results/findings within the past 24 hours.    Assessment/Plan:      * Acute on chronic anemia  GI  pathway  Serial H/H  Await Hem/onc opinion - lost to follow up  IV PPI    Frequent hospital admissions  Ongoing issue  Ideally should be under hospice care       Seizure disorder  Maintain keppra PO      PEG (percutaneous endoscopic gastrostomy) status  Aware f.         UTI (urinary tract infection)  Urine culture and started on abx   Yeast noted in culture.     Chronic indwelling Mathur catheter  Aware       Functional quadriplegia  Aware       Leucocytosis  WBC higher than baseline  Improving = 13 lower than has been for a while  Continue iv Zosyn for suspected UTI  Wounds vs urine vs blood  Urine cultures yeast less than 99K  Blood cultures - NGTD  waiting wound consult     Latest Reference Range & Units 12/14/23 05:00 12/15/23 05:51 12/16/23 05:07 12/17/23 04:57 12/18/23 05:03 12/19/23 04:09 02/23/24 14:11   WBC 3.90 - 12.70 K/uL 18.11 (H) 18.90 (H) 17.53 (H) 19.72 (H) 17.48 (H) 15.27 (H) 22.38 (H)   (H): Data is abnormally high      Pressure injury of sacral region, stage 4  Chronic issue  Wound care team consulted   Continue wound care at NH      Thrombocytosis  Pt has ? Myeloproliferative disorder  Details unknown seen by hem/on inpatient in 12/2023 about 2 months ago - no f/u since  Resume Xarelto at discharge per GI - Defer to Hem/Onc  Hopefully will see Dr. Virk on Monday - may be able to defer to outpatient    Impaired functional mobility, balance, gait, and endurance  Aware       Cognitive communication deficit  Aware       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  Creatine stable for now and GFR nomalized. BMP reviewed- noted Estimated Creatinine Clearance: 42.6 mL/min (based on SCr of 1 mg/dL). according to latest data. Based on current GFR, CKD stage is stage 3 - GFR 30-59.  Monitor UOP and serial BMP and adjust therapy as needed. Renally dose meds. Avoid nephrotoxic medications and procedures.    Type 2 diabetes mellitus with both eyes affected by moderate nonproliferative retinopathy and macular  edema, with long-term current use of insulin  Last A1c reviewed-   Lab Results   Component Value Date    HGBA1C 7.8 (H) 12/05/2023     Hold home oral hyperglycemics if indicated - while hospitalized will use combined insulin therapy with basal and prandial insulin coverage, POCT glucose checks, hypoglycemic protocol and moderate correction scale - repeat HgA1c if no record within last 90 days.        VTE Risk Mitigation (From admission, onward)           Ordered     Reason for No Pharmacological VTE Prophylaxis  Once        Question:  Reasons:  Answer:  Risk of Bleeding    02/25/24 0545     Place PAUL hose  Until discontinued         02/24/24 1447     Place sequential compression device  Until discontinued         02/24/24 1447     IP VTE HIGH RISK PATIENT  Once         02/23/24 1532                    Discharge Planning   ALEX: 2/27/2024     Code Status: Full Code   Is the patient medically ready for discharge?:     Reason for patient still in hospital (select all that apply): Patient trending condition, Treatment, and Consult recommendations  Discharge Plan A: New Nursing Home placement - long term care facility                  Nanci Dior NP  Department of Hospital Medicine   [facility]     Awake/Cooperative/Drowsy

## 2024-02-26 NOTE — CONSULTS
[facility]  Adult Nutrition   Progress Note (Nutrition Support Management)    SUMMARY     Recommendations  1) Continue current PEG-tube feeding of Glucerna 1.2 at 55 mls/hr providing 1584 kcals, 79 g protein and 1063 mls water.   2) Increase FWF's to 100 mls every 4 hours to clear tube and assist in meeting fluid needs due to hypernatremia with a Na+ level of 148.   3) RD will continue to monitor rate/tolerance, weight, labs, skin, etc. and make recommendations prn.    Goals:   1) Pt to continue to have nutrition provision to meet 100% of her EEN/EPN.   2) Pt to have complete healing of wounds over the next several months.    Nutrition Goal Status: goal met    Communication of RD Recs: other (comment)    Dietitian Rounds Brief  Follow Up Nutrition Note: Observed pt and PEG-tube feeding infusing at the goal rate of 55 mls/hr with good tolerance. Pts LBM was yesterday, she does have a wound to her coccyx that was POA and is being addressed by wound care and nutritionally and her labs have been reviewed. FWF's have been increased to 100 mls every 4 hours due to hypernatremia. Will continue to follow biweekly and prn till discharge.    Social Determinants of Health: MANNY spoke to Leonie Johnson, patient's daughter, to discuss her request to transfer patient to another nursing home. MANNY informed Ms. Johnson that  supervisor will call [facility] and ask to speak to the . MANNY further told Ms. Johnson that the  supervisor will attempt to speak with  on 2/26/2024 to facilitate a phone call with Ms. Johnson to discuss her concerns regarding patient's care. MANNY explained to Alex that patient will have to return to [facility] if acceptance from another facility is not obtained before patient's discharge. Ms. Johnson verbalized understanding. MANNY faxed records to nursing facilities within a 30 mile radius in an attempt to facilitate transfer.  supervisor  "informed SW that she will follow-up with this matter on 2/26/2024.     Nutrition Diagnosis PES Statement:  Increased fluid needs related to pts sodium level as evidenced by hypernatremia with a sodium level of 148.    Diet order:   Current Diet Order: NPO      Current Nutrition Support Formula Ordered: Glucerna 1.2  Current Nutrition Support Rate Ordered: 55 (ml)  Current Nutrition Support Frequency Ordered: continuous    Evaluation of Received Nutrient/Fluid Intake  Enteral Calories (kcal): 1584  Enteral Protein (gm): 79  Enteral (Free Water) Fluid (mL): 1063  Free Water Flush Fluid (mL): 180 (30 mls every 4 hours)  Total Calories (kcal): 1584  Total Calories (kcal/kg): 29  % Kcal Needs: 100 (97% maximum and 117% minimum EEN)  Total Protein (gm): 79  Total Protein (gm/kg): 1.5  % Protein Needs: 100 (98% maximum and 122% minimum needs met)  Total Fluid Intake (mL): 1243  Energy Calories Required: meeting needs  Protein Required: meeting needs  Fluid Required: meeting needs  Tolerance: tolerating     % Intake of Estimated Energy Needs: 75 - 100 %  % Meal Intake: NPO      Intake/Output Summary (Last 24 hours) at 2/26/2024 0926  Last data filed at 2/26/2024 0647  Gross per 24 hour   Intake 823 ml   Output 700 ml   Net 123 ml        Anthropometrics  Temp: 100.1 °F (37.8 °C)  Height Method: Stated  Height: 4' 11" (149.9 cm)  Height (inches): 59 in  Weight Method: Bed Scale  Weight: 54.2 kg (119 lb 7.8 oz)  Weight (lb): 119.49 lb  Ideal Body Weight (IBW), Female: 95 lb  % Ideal Body Weight, Female (lb): 125.78 %  BMI (Calculated): 24.1  BMI Grade: 18.5-24.9 - normal       Estimated/Assessed Needs  Weight Used For Calorie Calculations: 54.2 kg (119 lb 7.8 oz)  Energy Calorie Requirements (kcal): 6488-8089 kcals/day (25-30 kcals/kg ABW)  Energy Need Method: Kcal/kg  Protein Requirements: 65-81 g/day (1.2-1.5 g/kg ABW)  Weight Used For Protein Calculations: 54.2 kg (119 lb 7.8 oz)     Estimated Fluid Requirement Method: RDA " Method  RDA Method (mL): 1355  CHO Requirement: 169-203 g CHO/day    Reason for Assessment  Reason For Assessment: consult  Diagnosis: other (see comments) (Acute on chronic anemia)  Relevant Medical History: Diabetes mellitus, Hypertension, COPD (chronic obstructive pulmonary disease), Arthritis, Wears glasses, Stroke, Complete tear of left rotator cuff, H/o Cerebrovascular accident (CVA) of left pontine structure, COVID-19 infection, Personal history of colonic polyps, 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, Cognitive communication deficit, Impaired functional mobility, balance, gait, and endurance, Thrombocytosis, Pressure injury of sacral region, stage 4, Leucocytosis, Bedridden, Chronic indwelling Mathur catheter, UTI (urinary tract infection), PEG (percutaneous endoscopic gastrostomy) status, Seizure disorder, Frequent hospital admissions  Interdisciplinary Rounds: did not attend  Nutrition Discharge Planning: Pending    Nutrition/Diet History  Food Allergies: NKFA  Factors Affecting Nutritional Intake: NPO    Nutrition Risk Screen  Nutrition Risk Screen: tube feeding or parenteral nutrition       Altered Skin Integrity 10/24/23 2311 medial Coccyx Ulceration Full thickness tissue loss. Subcutaneous fat may be visible but bone, tendon or muscle are not exposed-Wound Image: Images linked  MST Score: 2  Have you recently lost weight without trying?: Yes: Unsure how much  Weight loss score: 2  Have you been eating poorly because of a decreased appetite?: No  Appetite score: 0       Weight History:  Wt Readings from Last 5 Encounters:   02/23/24 54.2 kg (119 lb 7.8 oz)   02/14/24 61.7 kg (136 lb)   01/09/24 61.7 kg (136 lb)   12/06/23 61.8 kg (136 lb 4.3 oz)   12/12/23 61.7 kg (136 lb)        Lab/Procedures/Meds: Pertinent Labs/Meds Reviewed    Medications:Pertinent Medications Reviewed  Scheduled Meds:   insulin detemir U-100  10 Units Subcutaneous Daily    levetiracetam  500 mg Per G Tube BID    magnesium oxide   400 mg Per G Tube Daily    metoprolol tartrate  25 mg Per G Tube BID    mupirocin   Nasal BID    pantoprazole  40 mg Intravenous BID    piperacillin-tazobactam (Zosyn) IV (PEDS and ADULTS) (extended infusion is not appropriate)  3.375 g Intravenous Q8H    polyethylene glycol  17 g Per G Tube Daily     Continuous Infusions:  PRN Meds:.0.9%  NaCl infusion (for blood administration), 0.9%  NaCl infusion (for blood administration), acetaminophen, acetaminophen, aluminum-magnesium hydroxide-simethicone, baclofen, dextrose 50% in water (D50W), dextrose 50% in water (D50W), glucagon (human recombinant), glucose, glucose, insulin aspart U-100, magnesium oxide, magnesium oxide, ondansetron, potassium bicarbonate, potassium bicarbonate, potassium bicarbonate, potassium, sodium phosphates, potassium, sodium phosphates, potassium, sodium phosphates    Labs: Pertinent Labs Reviewed  Clinical Chemistry:  Recent Labs   Lab 02/24/24  0607 02/25/24  0540 02/26/24  0417   * 148* 148*   K 4.0 3.9 3.7   * 117* 118*   CO2 25 20* 22*    123* 277*   BUN 45* 27* 22   CREATININE 1.1 1.0 1.0   CALCIUM 8.8 8.9 8.7   PROT 7.2 7.3 7.1   ALBUMIN 2.8* 2.9* 2.9*   BILITOT 0.4 0.4 0.3   ALKPHOS 70 80 90   AST 13 14 19   ALT 12 10 14   ANIONGAP 8 11 8   MG 2.0 1.9 1.9     CBC:   Recent Labs   Lab 02/26/24  0417   WBC 13.30*   RBC 2.94*   HGB 7.7*   HCT 24.2*   *   MCV 82   MCH 26.2*   MCHC 31.8*     Cardiac Profile:  Recent Labs   Lab 02/23/24  1411   BNP 22     Monitor and Evaluation  Food and Nutrient Intake: enteral nutrition intake  Food and Nutrient Adminstration: enteral and parenteral nutrition administration  Knowledge/Beliefs/Attitudes: beliefs and attitudes, food and nutrition knowledge/skill  Physical Activity and Function: nutrition-related ADLs and IADLs, factors affecting access to physical activity  Anthropometric Measurements: weight, weight change, body mass index  Biochemical Data, Medical Tests and  Procedures: lipid profile, inflammatory profile, glucose/endocrine profile, gastrointestinal profile, electrolyte and renal panel  Nutrition-Focused Physical Findings: overall appearance     Nutrition Risk  Level of Risk/Frequency of Follow-up: moderate     Nutrition Follow-Up  RD Follow-up?: Yes      Cria Hernandez RD 02/26/2024 9:26 AM

## 2024-02-27 LAB
ALBUMIN SERPL BCP-MCNC: 3.1 G/DL (ref 3.5–5.2)
ALP SERPL-CCNC: 93 U/L (ref 55–135)
ALT SERPL W/O P-5'-P-CCNC: 15 U/L (ref 10–44)
ANION GAP SERPL CALC-SCNC: 9 MMOL/L (ref 8–16)
AST SERPL-CCNC: 21 U/L (ref 10–40)
BASOPHILS # BLD AUTO: 0.05 K/UL (ref 0–0.2)
BASOPHILS NFR BLD: 0.3 % (ref 0–1.9)
BILIRUB SERPL-MCNC: 0.3 MG/DL (ref 0.1–1)
BUN SERPL-MCNC: 18 MG/DL (ref 8–23)
CALCIUM SERPL-MCNC: 8.6 MG/DL (ref 8.7–10.5)
CHLORIDE SERPL-SCNC: 112 MMOL/L (ref 95–110)
CO2 SERPL-SCNC: 21 MMOL/L (ref 23–29)
CREAT SERPL-MCNC: 0.9 MG/DL (ref 0.5–1.4)
DIFFERENTIAL METHOD BLD: ABNORMAL
EOSINOPHIL # BLD AUTO: 0.3 K/UL (ref 0–0.5)
EOSINOPHIL NFR BLD: 1.8 % (ref 0–8)
ERYTHROCYTE [DISTWIDTH] IN BLOOD BY AUTOMATED COUNT: 20.6 % (ref 11.5–14.5)
EST. GFR  (NO RACE VARIABLE): >60 ML/MIN/1.73 M^2
GLUCOSE SERPL-MCNC: 210 MG/DL (ref 70–110)
GLUCOSE SERPL-MCNC: 280 MG/DL (ref 70–110)
GLUCOSE SERPL-MCNC: 292 MG/DL (ref 70–110)
GLUCOSE SERPL-MCNC: 315 MG/DL (ref 70–110)
GLUCOSE SERPL-MCNC: 322 MG/DL (ref 70–110)
GLUCOSE SERPL-MCNC: 323 MG/DL (ref 70–110)
HCT VFR BLD AUTO: 25.5 % (ref 37–48.5)
HGB BLD-MCNC: 8.2 G/DL (ref 12–16)
IMM GRANULOCYTES # BLD AUTO: 0.19 K/UL (ref 0–0.04)
IMM GRANULOCYTES NFR BLD AUTO: 1.3 % (ref 0–0.5)
LYMPHOCYTES # BLD AUTO: 2 K/UL (ref 1–4.8)
LYMPHOCYTES NFR BLD: 13 % (ref 18–48)
MAGNESIUM SERPL-MCNC: 1.8 MG/DL (ref 1.6–2.6)
MCH RBC QN AUTO: 26.5 PG (ref 27–31)
MCHC RBC AUTO-ENTMCNC: 32.2 G/DL (ref 32–36)
MCV RBC AUTO: 83 FL (ref 82–98)
MONOCYTES # BLD AUTO: 1.7 K/UL (ref 0.3–1)
MONOCYTES NFR BLD: 11.2 % (ref 4–15)
NEUTROPHILS # BLD AUTO: 11 K/UL (ref 1.8–7.7)
NEUTROPHILS NFR BLD: 72.4 % (ref 38–73)
NRBC BLD-RTO: 0 /100 WBC
PLATELET # BLD AUTO: 587 K/UL (ref 150–450)
PMV BLD AUTO: 9.9 FL (ref 9.2–12.9)
POTASSIUM SERPL-SCNC: 4.2 MMOL/L (ref 3.5–5.1)
PROT SERPL-MCNC: 7.3 G/DL (ref 6–8.4)
RBC # BLD AUTO: 3.09 M/UL (ref 4–5.4)
SODIUM SERPL-SCNC: 142 MMOL/L (ref 136–145)
WBC # BLD AUTO: 15.18 K/UL (ref 3.9–12.7)

## 2024-02-27 PROCEDURE — 80053 COMPREHEN METABOLIC PANEL: CPT | Performed by: NURSE PRACTITIONER

## 2024-02-27 PROCEDURE — 99233 SBSQ HOSP IP/OBS HIGH 50: CPT | Mod: ,,, | Performed by: INTERNAL MEDICINE

## 2024-02-27 PROCEDURE — 63600175 PHARM REV CODE 636 W HCPCS: Performed by: INTERNAL MEDICINE

## 2024-02-27 PROCEDURE — 83735 ASSAY OF MAGNESIUM: CPT | Performed by: INTERNAL MEDICINE

## 2024-02-27 PROCEDURE — 63600175 PHARM REV CODE 636 W HCPCS: Performed by: NURSE PRACTITIONER

## 2024-02-27 PROCEDURE — 25000003 PHARM REV CODE 250: Performed by: INTERNAL MEDICINE

## 2024-02-27 PROCEDURE — 25000003 PHARM REV CODE 250: Performed by: NURSE PRACTITIONER

## 2024-02-27 PROCEDURE — 85025 COMPLETE CBC W/AUTO DIFF WBC: CPT | Performed by: NURSE PRACTITIONER

## 2024-02-27 PROCEDURE — 21400001 HC TELEMETRY ROOM

## 2024-02-27 PROCEDURE — 36415 COLL VENOUS BLD VENIPUNCTURE: CPT | Performed by: INTERNAL MEDICINE

## 2024-02-27 PROCEDURE — C9113 INJ PANTOPRAZOLE SODIUM, VIA: HCPCS | Performed by: NURSE PRACTITIONER

## 2024-02-27 RX ORDER — FUROSEMIDE 40 MG/1
40 TABLET ORAL DAILY
Status: DISCONTINUED | OUTPATIENT
Start: 2024-02-28 | End: 2024-02-29 | Stop reason: HOSPADM

## 2024-02-27 RX ORDER — HYDRALAZINE HYDROCHLORIDE 20 MG/ML
10 INJECTION INTRAMUSCULAR; INTRAVENOUS ONCE
Status: COMPLETED | OUTPATIENT
Start: 2024-02-27 | End: 2024-02-27

## 2024-02-27 RX ORDER — AMLODIPINE BESYLATE 5 MG/1
10 TABLET ORAL DAILY
Status: DISCONTINUED | OUTPATIENT
Start: 2024-02-27 | End: 2024-02-29 | Stop reason: HOSPADM

## 2024-02-27 RX ADMIN — PIPERACILLIN SODIUM AND TAZOBACTAM SODIUM 3.38 G: 3; .375 INJECTION, POWDER, LYOPHILIZED, FOR SOLUTION INTRAVENOUS at 12:02

## 2024-02-27 RX ADMIN — MUPIROCIN 1 G: 20 OINTMENT TOPICAL at 09:02

## 2024-02-27 RX ADMIN — INSULIN ASPART 8 UNITS: 100 INJECTION, SOLUTION INTRAVENOUS; SUBCUTANEOUS at 02:02

## 2024-02-27 RX ADMIN — Medication 800 MG: at 09:02

## 2024-02-27 RX ADMIN — Medication 400 MG: at 09:02

## 2024-02-27 RX ADMIN — Medication 800 MG: at 05:02

## 2024-02-27 RX ADMIN — LEVETIRACETAM 500 MG: 100 SOLUTION ORAL at 09:02

## 2024-02-27 RX ADMIN — INSULIN DETEMIR 10 UNITS: 100 INJECTION, SOLUTION SUBCUTANEOUS at 08:02

## 2024-02-27 RX ADMIN — HYDRALAZINE HYDROCHLORIDE 10 MG: 20 INJECTION INTRAMUSCULAR; INTRAVENOUS at 04:02

## 2024-02-27 RX ADMIN — PANTOPRAZOLE SODIUM 40 MG: 40 INJECTION, POWDER, FOR SOLUTION INTRAVENOUS at 09:02

## 2024-02-27 RX ADMIN — METOPROLOL TARTRATE 25 MG: 25 TABLET, FILM COATED ORAL at 09:02

## 2024-02-27 RX ADMIN — INSULIN ASPART 8 UNITS: 100 INJECTION, SOLUTION INTRAVENOUS; SUBCUTANEOUS at 08:02

## 2024-02-27 RX ADMIN — LOSARTAN POTASSIUM 50 MG: 50 TABLET, FILM COATED ORAL at 09:02

## 2024-02-27 RX ADMIN — PIPERACILLIN SODIUM AND TAZOBACTAM SODIUM 3.38 G: 3; .375 INJECTION, POWDER, LYOPHILIZED, FOR SOLUTION INTRAVENOUS at 03:02

## 2024-02-27 RX ADMIN — LEVETIRACETAM 500 MG: 100 SOLUTION ORAL at 08:02

## 2024-02-27 RX ADMIN — METOPROLOL TARTRATE 25 MG: 25 TABLET, FILM COATED ORAL at 08:02

## 2024-02-27 RX ADMIN — MUPIROCIN 1 G: 20 OINTMENT TOPICAL at 08:02

## 2024-02-27 RX ADMIN — AMLODIPINE BESYLATE 10 MG: 5 TABLET ORAL at 05:02

## 2024-02-27 RX ADMIN — PIPERACILLIN SODIUM AND TAZOBACTAM SODIUM 3.38 G: 3; .375 INJECTION, POWDER, LYOPHILIZED, FOR SOLUTION INTRAVENOUS at 07:02

## 2024-02-27 NOTE — CONSULTS
Chief complaint:  Weakness (Pt sent from [facility] with abnormal labs. EMS stated that pts H&H is low. Pt is non verbal and is at baseline.)      HPI:  Steff Johnson is a 64 y.o. female presenting with a stage 3 sacral pressure ulcer, an open wound of the left leg and dry eschars of the right foot and left knee. Pt is aphasic, and is unable to mprovide any history. She is a resident of [facility].    PMH:  As per HPI and below:  Past Medical History:   Diagnosis Date    Arthritis     Complete tear of left rotator cuff 11/09/2017    COPD (chronic obstructive pulmonary disease)     COVID-19 infection 07/02/2021    Diabetes mellitus     H/o Cerebrovascular accident (CVA) of left pontine structure 12/12/2019    Hypertension     Personal history of colonic polyps     Stroke     Wears glasses        Social History     Socioeconomic History    Marital status: Single   Occupational History     Comment: disabled due to shoulder problems   Tobacco Use    Smoking status: Never    Smokeless tobacco: Never   Substance and Sexual Activity    Alcohol use: No    Drug use: No    Sexual activity: Not Currently     Social Determinants of Health     Financial Resource Strain: Low Risk  (12/6/2023)    Overall Financial Resource Strain (CARDIA)     Difficulty of Paying Living Expenses: Not hard at all   Food Insecurity: No Food Insecurity (12/6/2023)    Hunger Vital Sign     Worried About Running Out of Food in the Last Year: Never true     Ran Out of Food in the Last Year: Never true   Transportation Needs: No Transportation Needs (12/6/2023)    PRAPARE - Transportation     Lack of Transportation (Medical): No     Lack of Transportation (Non-Medical): No   Physical Activity: Sufficiently Active (12/6/2023)    Exercise Vital Sign     Days of Exercise per Week: 5 days     Minutes of Exercise per Session: 150+ min   Stress: No Stress Concern Present (12/6/2023)    [employer] [employer] - Occupational Stress  Questionnaire     Feeling of Stress : Only a little   Social Connections: Moderately Isolated (12/6/2023)    Social Connection and Isolation Panel [NHANES]     Frequency of Communication with Friends and Family: Once a week     Frequency of Social Gatherings with Friends and Family: Once a week     Attends Zoroastrian Services: More than 4 times per year     Active Member of Clubs or Organizations: No     Attends Club or Organization Meetings: Never     Marital Status: Living with partner   Housing Stability: Low Risk  (12/6/2023)    Housing Stability Vital Sign     Unable to Pay for Housing in the Last Year: No     Number of Places Lived in the Last Year: 2     Unstable Housing in the Last Year: No       Past Surgical History:   Procedure Laterality Date    COLONOSCOPY N/A 4/11/2017    Procedure: COLONOSCOPY;  Surgeon: Jared Antonio MD;  Location: [facility];  Service: Endoscopy;  Laterality: N/A;    CYSTOSCOPY W/ RETROGRADES N/A 12/14/2023    Procedure: CYSTOSCOPY, WITH RETROGRADE PYELOGRAM;  Surgeon: Sergio Soria MD;  Location: [facility];  Service: Urology;  Laterality: N/A;    ECHOCARDIOGRAM,TRANSESOPHAGEAL N/A 12/12/2023    Procedure: Transesophageal echo (GT) intra-procedure log documentation;  Surgeon: Antoine Rivera MD;  Location: [facility] CATH/EP LAB;  Service: Cardiology;  Laterality: N/A;    HYSTERECTOMY      INSERTION OF CATHETER N/A 12/14/2023    Procedure: INSERTION, CATHETER;  Surgeon: Sergio Soria MD;  Location: [facility] OR;  Service: Urology;  Laterality: N/A;    OOPHORECTOMY      SHOULDER SURGERY      R    TRANSESOPHAGEAL ECHOCARDIOGRAM WITH POSSIBLE CARDIOVERSION (GT W/ POSS CARDIOVERSION) N/A 5/4/2023    Procedure: Transesophageal echo (GT) intra-procedure log documentation;  Surgeon: Blade Phillip MD;  Location: [facility] CATH/EP LAB;  Service: Cardiology;  Laterality: N/A;       Family History   Problem Relation Age of Onset    Diabetes Mother     Asthma Mother     Hypertension  Mother     Diabetes Father     Diabetes Brother     Hypertension Brother     Anemia Daughter     Glaucoma Neg Hx     Macular degeneration Neg Hx     Retinal detachment Neg Hx        Review of patient's allergies indicates:  No Known Allergies    No current facility-administered medications on file prior to encounter.     Current Outpatient Medications on File Prior to Encounter   Medication Sig Dispense Refill    allopurinoL (ZYLOPRIM) 100 MG tablet 100 mg by Per G Tube route once daily.      amLODIPine (NORVASC) 10 MG tablet Take 1 tablet (10 mg total) by mouth once daily. (Patient taking differently: 10 mg by Per G Tube route once daily.) 90 tablet 3    apixaban (ELIQUIS) 2.5 mg Tab 2.5 mg by Per G Tube route 2 (two) times daily.      ascorbic acid, vitamin C, (VITAMIN C) 500 MG tablet 500 mg by Per G Tube route 2 (two) times daily.      aspirin 81 MG Chew Take 1 tablet (81 mg total) by mouth once daily. (Patient taking differently: 81 mg by Per G Tube route once daily.) 30 tablet 0    atorvastatin (LIPITOR) 80 MG tablet Take 1 tablet (80 mg total) by mouth every evening. (Patient taking differently: 80 mg by Per G Tube route every evening.) 30 tablet 0    folic acid (FOLVITE) 1 MG tablet 1 tablet by Per G Tube route every morning.      furosemide (LASIX) 40 MG tablet 40 mg by Per G Tube route once daily.      HUMALOG KWIKPEN INSULIN 100 unit/mL pen Inject 0-20 Units into the skin As instructed (inject 4 times daily per sliding scale). 0 -149 = 0 units  150 - 199 = 4 units  200 - 249 = 8 units  250 - 299 = 12 units  300 - 349 = 16 units  350 - 1000 = 20 units Call MD      insulin (LANTUS SOLOSTAR U-100 INSULIN) glargine 100 units/mL SubQ pen Inject 35 Units into the skin once daily.      insulin lispro 100 unit/mL injection Inject 2 Units into the skin 3 (three) times daily before meals. 07:30  11:30  16:30      latanoprost 0.005 % ophthalmic solution Place 1 drop into both eyes once daily. (Patient taking  differently: Place 1 drop into both eyes every evening.) 7.5 mL 6    levETIRAcetam (KEPPRA) 100 mg/mL Soln 500 mg by Per G Tube route 2 (two) times daily.      losartan (COZAAR) 100 MG tablet Take 1 tablet (100 mg total) by mouth once daily. (Patient taking differently: 100 mg by Per G Tube route once daily.) 90 tablet 3    magnesium oxide (MAG-OX) 400 mg (241.3 mg magnesium) tablet 400 mg by Per G Tube route once daily.      metFORMIN (GLUCOPHAGE) 500 MG tablet 1,000 mg by Per G Tube route 2 (two) times daily with meals.      metoclopramide HCl (REGLAN) 5 mg/5 mL Soln 10 mg by Per G Tube route 3 (three) times daily.      metoprolol tartrate (LOPRESSOR) 25 MG tablet 25 mg by Per G Tube route 2 (two) times daily.      multivitamin (THERAGRAN) per tablet 1 tablet by Per G Tube route once daily.      omeprazole (PRILOSEC) 20 MG capsule Take 20 mg by mouth once daily. Per G-Tube      polyethylene glycol (GLYCOLAX) 17 gram PwPk 17 g by Per G Tube route once daily.      thiamine (VITAMIN B-1) 100 MG tablet 100 mg by Per G Tube route once daily.      zinc sulfate (ZINC-220 ORAL) 1 tablet by PEG Tube route Daily.      baclofen (LIORESAL) 5 mg Tab tablet 5 mg by Per G Tube route every 8 (eight) hours as needed (muscle spasms).      cloNIDine (CATAPRES) 0.1 MG tablet Take 1 tablet (0.1 mg total) by mouth 3 (three) times daily. (Patient taking differently: Take 0.1 mg by mouth 4 (four) times daily as needed (For systolic over 160).) 90 tablet 11    tiotropium (SPIRIVA) 18 mcg inhalation capsule Inhale 1 capsule (18 mcg total) into the lungs once daily. Controller 30 capsule 0    [DISCONTINUED] blood-glucose meter (TRUE METRIX GLUCOSE METER) kit Use as instructed 1 each 0    [DISCONTINUED] lancing device (TRUEDRAW LANCING DEVICE) Misc Inject 1 Device into the skin 2 (two) times daily. 1 each 3       ROS: As per HPI and below:  Review of systems not obtained due to patient factors.      Physical Exam:     Vitals:    02/26/24  "1100 24 1700 24   BP: (!) 165/77 (!) 198/86  (!) 181/85   BP Location: Right arm Right arm     Patient Position: Lying Lying     Pulse: 64 71  77   Resp:       Temp: 98.1 °F (36.7 °C) (!) 95.5 °F (35.3 °C) (!) 100.5 °F (38.1 °C)    TempSrc: Axillary Axillary     SpO2: 100% 100%     Weight:       Height:           BP  Min: 108/58  Max: 198/86  Temp  Av.2 °F (36.8 °C)  Min: 95.5 °F (35.3 °C)  Max: 100.5 °F (38.1 °C)  Pulse  Av  Min: 64  Max: 93  Resp  Av.3  Min: 15  Max: 20  SpO2  Av.2 %  Min: 97 %  Max: 100 %  Height  Av' 11" (149.9 cm)  Min: 4' 11" (149.9 cm)  Max: 4' 11" (149.9 cm)  Weight  Av.9 kg (127 lb 11.9 oz)  Min: 54.2 kg (119 lb 7.8 oz)  Max: 61.7 kg (136 lb)    Body mass index is 24.13 kg/m².          General:             Well developed, well nourished, no apparent distress  HEENT:              NCAT, no JVD, mucous membranes moist, EOM intact  Cardiovascular:  Regular rate and rhythm, normal S1, normal S2, No murmurs, rubs, or gallops  Respiratory:        Normal breath sounds, no wheezes, no rales, no rhonchi  Abdomen:           Bowel sounds present, non tender, non distended, no masses, no hepatojugular reflux  Extremities:        No clubbing, no cyanosis, no edema  Vascular:            2+ b/l radial.  Peripheral pulses intact.  No carotid bruits.  Neurological:      No focal deficits  Skin:                   No obvious rashes or erythema,wounds as follows:  Stage 3 sacral pressure ulcer  Right foot dry eschar  Left knee dry eschar  Open wound of the left leg                    Lab Results   Component Value Date    WBC 13.30 (H) 2024    HGB 7.7 (L) 2024    HCT 24.2 (L) 2024    MCV 82 2024     (H) 2024     Lab Results   Component Value Date    CHOL 92 (L) 2023    CHOL 157 2023    CHOL 156 2023     Lab Results   Component Value Date    HDL 38 (L) 2023    HDL 73 2023    HDL 56 " 02/22/2023     Lab Results   Component Value Date    LDLCALC 41.6 (L) 08/04/2023    LDLCALC 65.4 05/02/2023    LDLCALC 85.6 02/22/2023     Lab Results   Component Value Date    TRIG 62 08/04/2023    TRIG 93 05/02/2023    TRIG 72 02/22/2023     Lab Results   Component Value Date    CHOLHDL 41.3 08/04/2023    CHOLHDL 46.5 05/02/2023    CHOLHDL 35.9 02/22/2023     CMP  Recent Labs   Lab 02/26/24  0417   *   CALCIUM 8.7   ALBUMIN 2.9*   PROT 7.1   *   K 3.7   CO2 22*   *   BUN 22   CREATININE 1.0   ALKPHOS 90   ALT 14   AST 19   BILITOT 0.3      Lab Results   Component Value Date    TSH 1.908 10/24/2023         Assessment and Recommendations        Diagnoses:    Stage 3 sacral pressure ulcer  Right foot dry eschar  Left knee dry eschar  Open wound of the left leg    Plan:  Betadine to the right foot and left knee eschars daily  Pack sacral wound with aquacell Ag and cover with an ABD and secure  Left leg aquacell Ag + mepilex daily     Complexity:    moderate

## 2024-02-27 NOTE — PROGRESS NOTES
[facility] Medicine  Progress Note    Patient Name: Steff Johnson  MRN: [id number]  Patient Class: IP- Inpatient   Admission Date: 2/23/2024  Length of Stay: 3 days  Attending Physician: Octavio Brady Jr., MD  Primary Care Provider: Cristina Ren MD        Subjective:     Principal Problem:Acute on chronic anemia        HPI:  64 year old pt getting transferred from facility with H/o weakness/ Low H/H and bladimir  Pt is bed bound and aphasic and has History of frequent hospitalizations  Random labs were obtained in facility and pt was found to be anemic  Also reports of dark stools too  Transferred to ER and got admitted  Further History unavailable       Overview/Hospital Course:  64 y.o. F transferred from nursing facility with complaints of melena.  Patient had positive Hemoccult with initial hemoglobin of 7.7.  In early morning hemoglobin rechecked and found to be 6.2 repeat = 6.0.  Consent received from patient's family feel witnessed nursing + NP over the phone for blood transfusion. Wound care consulted (wounds), Hem/onc (acute on chronic thrombocytosis), GI (OB positive, hg 7.7-->6). Patient was transfused 1 unit PRBC and GI performed EGD with finding of significant for a single angioectasia in the duodenum that was treated with argon plasma coagulation. Recommended resuming Xarelto at prior dose tomorrow 12/26/23 and cleared patient from GI standpoint for discharge.    Patient's white count improving on IV zosyn from 22.38-->13.6. Will see Moose in am regarding bone marrow biopsy workup (inpatient vs outpatient). She was receiving wound care in the NH. Discharge planning back to NH is appropriate after recommendations from Dr. Virk for bone marrow biopsy. Per GI ok to resume Xarelto    Interval History:  Patient seen and examined.  Notes reviewed.  Tmax 100.4F.  WBC slighly up.  CXR repeated - no acute process.  Continuing ABX for now.  D/W attending Dr. Brady.  Will  continue to monitor.  Daughter Leonie updated.    Review of Systems   Unable to perform ROS: Patient nonverbal     Objective:     Vital Signs (Most Recent):  Temp: 98.6 °F (37 °C) (02/27/24 1100)  Pulse: 70 (02/27/24 1100)  Resp: 18 (02/27/24 0752)  BP: (!) 156/70 (02/27/24 1100)  SpO2: 98 % (02/27/24 1100) Vital Signs (24h Range):  Temp:  [95.5 °F (35.3 °C)-100.5 °F (38.1 °C)] 98.6 °F (37 °C)  Pulse:  [70-85] 70  Resp:  [18-20] 18  SpO2:  [98 %-100 %] 98 %  BP: (147-199)/(66-91) 156/70     Weight: 54.2 kg (119 lb 7.8 oz)  Body mass index is 24.13 kg/m².    Intake/Output Summary (Last 24 hours) at 2/27/2024 1543  Last data filed at 2/27/2024 0600  Gross per 24 hour   Intake 1310 ml   Output 1450 ml   Net -140 ml           Physical Exam  Constitutional:       Appearance: Ill appearance: Appears chronically ill.   Cardiovascular:      Rate and Rhythm: Normal rate and regular rhythm.      Pulses: Normal pulses.      Heart sounds: Normal heart sounds.   Pulmonary:      Effort: Pulmonary effort is normal. No respiratory distress.      Breath sounds: Normal breath sounds.   Abdominal:      General: Bowel sounds are normal.      Palpations: Abdomen is soft.   Neurological:      Mental Status: She is alert.             Significant Labs: All pertinent labs within the past 24 hours have been reviewed.  CBC:   Recent Labs   Lab 02/25/24  1600 02/26/24  0417 02/27/24 0423   WBC  --  13.30* 15.18*   HGB 8.3* 7.7* 8.2*   HCT 26.4* 24.2* 25.5*   PLT  --  650* 587*       CMP:   Recent Labs   Lab 02/26/24  0417 02/27/24 0423   * 142   K 3.7 4.2   * 112*   CO2 22* 21*   * 292*   BUN 22 18   CREATININE 1.0 0.9   CALCIUM 8.7 8.6*   PROT 7.1 7.3   ALBUMIN 2.9* 3.1*   BILITOT 0.3 0.3   ALKPHOS 90 93   AST 19 21   ALT 14 15   ANIONGAP 8 9         Significant Imaging: I have reviewed all pertinent imaging results/findings within the past 24 hours.    Procedure Component Value Units Date/Time   X-Ray Chest AP Portable  [[id number]] Collected: 02/27/24 1357   Order Status: Completed Updated: 02/27/24 1431   Narrative:     Reason: fever, possible aspiration    FINDINGS:    Portable chest with comparison chest x-ray February 23, 2024 show normal cardiomediastinal silhouette.  Linear opacities of the left mid lung again noted. No new lung opacities observed. Pulmonary vasculature is normal. No acute osseous abnormality.    IMPRESSION:    Linear opacities of the left mid lung are again observed. These are felt to reflect scarring or atelectasis.    Electronically signed by:  Messi Hollingsworth DO  02/27/2024 02:29 PM CST Workstation: [id number]       Assessment/Plan:      * Acute on chronic anemia  GI pathway  Serial H/H  Await Hem/onc opinion - lost to follow up  IV PPI    Frequent hospital admissions  Ongoing issue  Ideally should be under hospice care       Seizure disorder  Maintain keppra PO      PEG (percutaneous endoscopic gastrostomy) status  Aware f.         UTI (urinary tract infection)  Urine culture and started on abx   Yeast noted in culture.     Chronic indwelling Mathur catheter  Aware       Functional quadriplegia  Aware       Leucocytosis  WBC higher than baseline  Improving = 13 lower than has been for a while  Continue iv Zosyn for suspected UTI  Wounds vs urine vs blood  Urine cultures yeast less than 99K  Blood cultures - NGTD  waiting wound consult     Latest Reference Range & Units 12/14/23 05:00 12/15/23 05:51 12/16/23 05:07 12/17/23 04:57 12/18/23 05:03 12/19/23 04:09 02/23/24 14:11   WBC 3.90 - 12.70 K/uL 18.11 (H) 18.90 (H) 17.53 (H) 19.72 (H) 17.48 (H) 15.27 (H) 22.38 (H)   (H): Data is abnormally high      Pressure injury of sacral region, stage 4  Chronic issue  Wound care team consulted   Continue wound care at NH      Thrombocytosis  Pt has ? Myeloproliferative disorder  Details unknown seen by hem/on inpatient in 12/2023 about 2 months ago - no f/u since  Resume Xarelto at discharge per GI - Defer to  Hem/Onc  Hopefully will see Dr. Virk on Monday - may be able to defer to outpatient    Impaired functional mobility, balance, gait, and endurance  Aware       Cognitive communication deficit  Aware       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  Creatine stable for now and GFR nomalized. BMP reviewed- noted Estimated Creatinine Clearance: 42.6 mL/min (based on SCr of 1 mg/dL). according to latest data. Based on current GFR, CKD stage is stage 3 - GFR 30-59.  Monitor UOP and serial BMP and adjust therapy as needed. Renally dose meds. Avoid nephrotoxic medications and procedures.    Type 2 diabetes mellitus with both eyes affected by moderate nonproliferative retinopathy and macular edema, with long-term current use of insulin  Last A1c reviewed-   Lab Results   Component Value Date    HGBA1C 7.8 (H) 12/05/2023     Hold home oral hyperglycemics if indicated - while hospitalized will use combined insulin therapy with basal and prandial insulin coverage, POCT glucose checks, hypoglycemic protocol and moderate correction scale - repeat HgA1c if no record within last 90 days.        VTE Risk Mitigation (From admission, onward)           Ordered     Reason for No Pharmacological VTE Prophylaxis  Once        Question:  Reasons:  Answer:  Risk of Bleeding    02/25/24 0545     Place PAUL hose  Until discontinued         02/24/24 1447     Place sequential compression device  Until discontinued         02/24/24 1447     IP VTE HIGH RISK PATIENT  Once         02/23/24 1532                    Discharge Planning   ALEX: 2/28/2024     Code Status: Full Code   Is the patient medically ready for discharge?:     Reason for patient still in hospital (select all that apply): Patient trending condition, Treatment, and Consult recommendations  Discharge Plan A: New Nursing Home placement - [facility] care facility                  Nanci Dior NP  Department of Hospital Medicine   Pending sale to [facility]

## 2024-02-27 NOTE — SUBJECTIVE & OBJECTIVE
Interval History:  Patient seen and examined.  Notes reviewed.  Tmax 100.4F.  WBC slighly up.  CXR repeated - no acute process.  Continuing ABX for now.  D/W attending Dr. Brady.  Will continue to monitor.  Daughter Leonie updated.    Review of Systems   Unable to perform ROS: Patient nonverbal     Objective:     Vital Signs (Most Recent):  Temp: 98.6 °F (37 °C) (02/27/24 1100)  Pulse: 70 (02/27/24 1100)  Resp: 18 (02/27/24 0752)  BP: (!) 156/70 (02/27/24 1100)  SpO2: 98 % (02/27/24 1100) Vital Signs (24h Range):  Temp:  [95.5 °F (35.3 °C)-100.5 °F (38.1 °C)] 98.6 °F (37 °C)  Pulse:  [70-85] 70  Resp:  [18-20] 18  SpO2:  [98 %-100 %] 98 %  BP: (147-199)/(66-91) 156/70     Weight: 54.2 kg (119 lb 7.8 oz)  Body mass index is 24.13 kg/m².    Intake/Output Summary (Last 24 hours) at 2/27/2024 1543  Last data filed at 2/27/2024 0600  Gross per 24 hour   Intake 1310 ml   Output 1450 ml   Net -140 ml           Physical Exam  Constitutional:       Appearance: Ill appearance: Appears chronically ill.   Cardiovascular:      Rate and Rhythm: Normal rate and regular rhythm.      Pulses: Normal pulses.      Heart sounds: Normal heart sounds.   Pulmonary:      Effort: Pulmonary effort is normal. No respiratory distress.      Breath sounds: Normal breath sounds.   Abdominal:      General: Bowel sounds are normal.      Palpations: Abdomen is soft.   Neurological:      Mental Status: She is alert.             Significant Labs: All pertinent labs within the past 24 hours have been reviewed.  CBC:   Recent Labs   Lab 02/25/24  1600 02/26/24 0417 02/27/24 0423   WBC  --  13.30* 15.18*   HGB 8.3* 7.7* 8.2*   HCT 26.4* 24.2* 25.5*   PLT  --  650* 587*       CMP:   Recent Labs   Lab 02/26/24  0417 02/27/24 0423   * 142   K 3.7 4.2   * 112*   CO2 22* 21*   * 292*   BUN 22 18   CREATININE 1.0 0.9   CALCIUM 8.7 8.6*   PROT 7.1 7.3   ALBUMIN 2.9* 3.1*   BILITOT 0.3 0.3   ALKPHOS 90 93   AST 19 21   ALT 14 15   ANIONGAP 8 9          Significant Imaging: I have reviewed all pertinent imaging results/findings within the past 24 hours.    Procedure Component Value Units Date/Time   X-Ray Chest AP Portable [[id number]] Collected: 02/27/24 1357   Order Status: Completed Updated: 02/27/24 1431   Narrative:     Reason: fever, possible aspiration    FINDINGS:    Portable chest with comparison chest x-ray February 23, 2024 show normal cardiomediastinal silhouette.  Linear opacities of the left mid lung again noted. No new lung opacities observed. Pulmonary vasculature is normal. No acute osseous abnormality.    IMPRESSION:    Linear opacities of the left mid lung are again observed. These are felt to reflect scarring or atelectasis.    Electronically signed by:  Messi Hollingsworth DO  02/27/2024 02:29 PM [facility] Workstation: [id number]

## 2024-02-28 LAB
ALBUMIN SERPL BCP-MCNC: 2.8 G/DL (ref 3.5–5.2)
ALP SERPL-CCNC: 87 U/L (ref 55–135)
ALT SERPL W/O P-5'-P-CCNC: 14 U/L (ref 10–44)
ANION GAP SERPL CALC-SCNC: 10 MMOL/L (ref 8–16)
AST SERPL-CCNC: 12 U/L (ref 10–40)
BACTERIA BLD CULT: NORMAL
BACTERIA BLD CULT: NORMAL
BASOPHILS # BLD AUTO: 0.05 K/UL (ref 0–0.2)
BASOPHILS NFR BLD: 0.3 % (ref 0–1.9)
BILIRUB SERPL-MCNC: 0.3 MG/DL (ref 0.1–1)
BUN SERPL-MCNC: 19 MG/DL (ref 8–23)
CALCIUM SERPL-MCNC: 8.5 MG/DL (ref 8.7–10.5)
CHLORIDE SERPL-SCNC: 107 MMOL/L (ref 95–110)
CO2 SERPL-SCNC: 22 MMOL/L (ref 23–29)
CREAT SERPL-MCNC: 0.7 MG/DL (ref 0.5–1.4)
DIFFERENTIAL METHOD BLD: ABNORMAL
EOSINOPHIL # BLD AUTO: 0.3 K/UL (ref 0–0.5)
EOSINOPHIL NFR BLD: 2.1 % (ref 0–8)
ERYTHROCYTE [DISTWIDTH] IN BLOOD BY AUTOMATED COUNT: 20.6 % (ref 11.5–14.5)
EST. GFR  (NO RACE VARIABLE): >60 ML/MIN/1.73 M^2
GLUCOSE SERPL-MCNC: 243 MG/DL (ref 70–110)
GLUCOSE SERPL-MCNC: 303 MG/DL (ref 70–110)
GLUCOSE SERPL-MCNC: 322 MG/DL (ref 70–110)
GLUCOSE SERPL-MCNC: 328 MG/DL (ref 70–110)
GLUCOSE SERPL-MCNC: 330 MG/DL (ref 70–110)
GLUCOSE SERPL-MCNC: 351 MG/DL (ref 70–110)
HCT VFR BLD AUTO: 25 % (ref 37–48.5)
HGB BLD-MCNC: 8 G/DL (ref 12–16)
IMM GRANULOCYTES # BLD AUTO: 0.27 K/UL (ref 0–0.04)
IMM GRANULOCYTES NFR BLD AUTO: 1.9 % (ref 0–0.5)
LYMPHOCYTES # BLD AUTO: 2.2 K/UL (ref 1–4.8)
LYMPHOCYTES NFR BLD: 15.4 % (ref 18–48)
MAGNESIUM SERPL-MCNC: 1.8 MG/DL (ref 1.6–2.6)
MCH RBC QN AUTO: 25.9 PG (ref 27–31)
MCHC RBC AUTO-ENTMCNC: 32 G/DL (ref 32–36)
MCV RBC AUTO: 81 FL (ref 82–98)
MONOCYTES # BLD AUTO: 1.3 K/UL (ref 0.3–1)
MONOCYTES NFR BLD: 9.1 % (ref 4–15)
NEUTROPHILS # BLD AUTO: 10.2 K/UL (ref 1.8–7.7)
NEUTROPHILS NFR BLD: 71.2 % (ref 38–73)
NRBC BLD-RTO: 0 /100 WBC
PLATELET # BLD AUTO: 588 K/UL (ref 150–450)
PMV BLD AUTO: 10.1 FL (ref 9.2–12.9)
POTASSIUM SERPL-SCNC: 3.8 MMOL/L (ref 3.5–5.1)
PROT SERPL-MCNC: 7 G/DL (ref 6–8.4)
RBC # BLD AUTO: 3.09 M/UL (ref 4–5.4)
SODIUM SERPL-SCNC: 139 MMOL/L (ref 136–145)
WBC # BLD AUTO: 14.37 K/UL (ref 3.9–12.7)

## 2024-02-28 PROCEDURE — 36415 COLL VENOUS BLD VENIPUNCTURE: CPT | Performed by: INTERNAL MEDICINE

## 2024-02-28 PROCEDURE — 25000003 PHARM REV CODE 250: Performed by: INTERNAL MEDICINE

## 2024-02-28 PROCEDURE — 63600175 PHARM REV CODE 636 W HCPCS: Performed by: INTERNAL MEDICINE

## 2024-02-28 PROCEDURE — 21400001 HC TELEMETRY ROOM

## 2024-02-28 PROCEDURE — C9113 INJ PANTOPRAZOLE SODIUM, VIA: HCPCS | Performed by: NURSE PRACTITIONER

## 2024-02-28 PROCEDURE — 80053 COMPREHEN METABOLIC PANEL: CPT | Performed by: NURSE PRACTITIONER

## 2024-02-28 PROCEDURE — 85025 COMPLETE CBC W/AUTO DIFF WBC: CPT | Performed by: NURSE PRACTITIONER

## 2024-02-28 PROCEDURE — 94761 N-INVAS EAR/PLS OXIMETRY MLT: CPT

## 2024-02-28 PROCEDURE — 83735 ASSAY OF MAGNESIUM: CPT | Performed by: INTERNAL MEDICINE

## 2024-02-28 PROCEDURE — 25000003 PHARM REV CODE 250: Performed by: NURSE PRACTITIONER

## 2024-02-28 PROCEDURE — 63600175 PHARM REV CODE 636 W HCPCS: Performed by: NURSE PRACTITIONER

## 2024-02-28 PROCEDURE — 27000221 HC OXYGEN, UP TO 24 HOURS

## 2024-02-28 PROCEDURE — 99233 SBSQ HOSP IP/OBS HIGH 50: CPT | Mod: ,,, | Performed by: INTERNAL MEDICINE

## 2024-02-28 RX ORDER — SODIUM,POTASSIUM PHOSPHATES 280-250MG
2 POWDER IN PACKET (EA) ORAL
Status: DISCONTINUED | OUTPATIENT
Start: 2024-02-28 | End: 2024-02-29 | Stop reason: HOSPADM

## 2024-02-28 RX ADMIN — LEVETIRACETAM 500 MG: 100 SOLUTION ORAL at 08:02

## 2024-02-28 RX ADMIN — PIPERACILLIN SODIUM AND TAZOBACTAM SODIUM 3.38 G: 3; .375 INJECTION, POWDER, LYOPHILIZED, FOR SOLUTION INTRAVENOUS at 01:02

## 2024-02-28 RX ADMIN — INSULIN ASPART 8 UNITS: 100 INJECTION, SOLUTION INTRAVENOUS; SUBCUTANEOUS at 12:02

## 2024-02-28 RX ADMIN — MUPIROCIN 1 G: 20 OINTMENT TOPICAL at 08:02

## 2024-02-28 RX ADMIN — LOSARTAN POTASSIUM 50 MG: 50 TABLET, FILM COATED ORAL at 08:02

## 2024-02-28 RX ADMIN — PIPERACILLIN SODIUM AND TAZOBACTAM SODIUM 3.38 G: 3; .375 INJECTION, POWDER, LYOPHILIZED, FOR SOLUTION INTRAVENOUS at 08:02

## 2024-02-28 RX ADMIN — INSULIN ASPART 8 UNITS: 100 INJECTION, SOLUTION INTRAVENOUS; SUBCUTANEOUS at 08:02

## 2024-02-28 RX ADMIN — METOPROLOL TARTRATE 25 MG: 25 TABLET, FILM COATED ORAL at 08:02

## 2024-02-28 RX ADMIN — PANTOPRAZOLE SODIUM 40 MG: 40 INJECTION, POWDER, FOR SOLUTION INTRAVENOUS at 08:02

## 2024-02-28 RX ADMIN — Medication 800 MG: at 08:02

## 2024-02-28 RX ADMIN — POTASSIUM BICARBONATE 50 MEQ: 977.5 TABLET, EFFERVESCENT ORAL at 01:02

## 2024-02-28 RX ADMIN — PIPERACILLIN SODIUM AND TAZOBACTAM SODIUM 3.38 G: 3; .375 INJECTION, POWDER, LYOPHILIZED, FOR SOLUTION INTRAVENOUS at 04:02

## 2024-02-28 RX ADMIN — Medication 800 MG: at 01:02

## 2024-02-28 RX ADMIN — INSULIN ASPART 2 UNITS: 100 INJECTION, SOLUTION INTRAVENOUS; SUBCUTANEOUS at 08:02

## 2024-02-28 RX ADMIN — FUROSEMIDE 40 MG: 40 TABLET ORAL at 08:02

## 2024-02-28 RX ADMIN — Medication 400 MG: at 08:02

## 2024-02-28 RX ADMIN — AMLODIPINE BESYLATE 10 MG: 5 TABLET ORAL at 08:02

## 2024-02-28 NOTE — SUBJECTIVE & OBJECTIVE
Interval History:  Patient seen and examined.  A little more interactive today.  Tells me she feels okay.  No further fevers.    Review of Systems   Unable to perform ROS: Patient nonverbal     Objective:     Vital Signs (Most Recent):  Temp: 98.7 °F (37.1 °C) (02/28/24 1100)  Pulse: 74 (02/28/24 1100)  Resp: 16 (02/28/24 0726)  BP: (!) 157/72 (02/28/24 1100)  SpO2: (!) 93 % (02/28/24 1100) Vital Signs (24h Range):  Temp:  [96.5 °F (35.8 °C)-98.9 °F (37.2 °C)] 98.7 °F (37.1 °C)  Pulse:  [73-80] 74  Resp:  [16-19] 16  SpO2:  [93 %-100 %] 93 %  BP: (150-185)/(63-84) 157/72     Weight: 54.2 kg (119 lb 7.8 oz)  Body mass index is 24.13 kg/m².    Intake/Output Summary (Last 24 hours) at 2/28/2024 1355  Last data filed at 2/28/2024 0405  Gross per 24 hour   Intake 960 ml   Output 1050 ml   Net -90 ml           Physical Exam  Constitutional:       Appearance: Ill appearance: Appears chronically ill.   Cardiovascular:      Rate and Rhythm: Normal rate and regular rhythm.      Pulses: Normal pulses.      Heart sounds: Normal heart sounds.   Pulmonary:      Effort: Pulmonary effort is normal. No respiratory distress.      Breath sounds: Normal breath sounds.   Abdominal:      General: Bowel sounds are normal.      Palpations: Abdomen is soft.   Neurological:      Mental Status: She is alert.             Significant Labs: All pertinent labs within the past 24 hours have been reviewed.  CBC:   Recent Labs   Lab 02/27/24 0423 02/28/24  0653   WBC 15.18* 14.37*   HGB 8.2* 8.0*   HCT 25.5* 25.0*   * 588*       CMP:   Recent Labs   Lab 02/27/24  0423 02/28/24  0653    139   K 4.2 3.8   * 107   CO2 21* 22*   * 303*   BUN 18 19   CREATININE 0.9 0.7   CALCIUM 8.6* 8.5*   PROT 7.3 7.0   ALBUMIN 3.1* 2.8*   BILITOT 0.3 0.3   ALKPHOS 93 87   AST 21 12   ALT 15 14   ANIONGAP 9 10         Significant Imaging: I have reviewed all pertinent imaging results/findings within the past 24 hours.

## 2024-02-28 NOTE — PROGRESS NOTES
[facility] Medicine  Progress Note    Patient Name: Steff Johnson  MRN: [id number]  Patient Class: IP- Inpatient   Admission Date: 2/23/2024  Length of Stay: 4 days  Attending Physician: Octavio Brady Jr., MD  Primary Care Provider: Cristina Ren MD        Subjective:     Principal Problem:Acute on chronic anemia        HPI:  64 year old pt getting transferred from facility with H/o weakness/ Low H/H and bladimir  Pt is bed bound and aphasic and has History of frequent hospitalizations  Random labs were obtained in facility and pt was found to be anemic  Also reports of dark stools too  Transferred to ER and got admitted  Further History unavailable       Overview/Hospital Course:  64 y.o. F transferred from nursing facility with complaints of melena.  Patient had positive Hemoccult with initial hemoglobin of 7.7.  In early morning hemoglobin rechecked and found to be 6.2 repeat = 6.0.  Consent received from patient's family feel witnessed nursing + NP over the phone for blood transfusion. Wound care consulted (wounds), Hem/onc (acute on chronic thrombocytosis), GI (OB positive, hg 7.7-->6). Patient was transfused 1 unit PRBC and GI performed EGD with finding of significant for a single angioectasia in the duodenum that was treated with argon plasma coagulation. Recommended resuming Xarelto at prior dose tomorrow 12/26/23 and cleared patient from GI standpoint for discharge.    Patient's white count improving on IV zosyn from 22.38-->13.6. Will see Moose in am regarding bone marrow biopsy workup (inpatient vs outpatient). She was receiving wound care in the NH. Discharge planning back to NH is appropriate after recommendations from Dr. Virk for bone marrow biopsy. Per GI ok to resume Xarelto    Interval History:  Patient seen and examined.  A little more interactive today.  Tells me she feels okay.  No further fevers.    Review of Systems   Unable to perform ROS: Patient nonverbal      Objective:     Vital Signs (Most Recent):  Temp: 98.7 °F (37.1 °C) (02/28/24 1100)  Pulse: 74 (02/28/24 1100)  Resp: 16 (02/28/24 0726)  BP: (!) 157/72 (02/28/24 1100)  SpO2: (!) 93 % (02/28/24 1100) Vital Signs (24h Range):  Temp:  [96.5 °F (35.8 °C)-98.9 °F (37.2 °C)] 98.7 °F (37.1 °C)  Pulse:  [73-80] 74  Resp:  [16-19] 16  SpO2:  [93 %-100 %] 93 %  BP: (150-185)/(63-84) 157/72     Weight: 54.2 kg (119 lb 7.8 oz)  Body mass index is 24.13 kg/m².    Intake/Output Summary (Last 24 hours) at 2/28/2024 1355  Last data filed at 2/28/2024 0405  Gross per 24 hour   Intake 960 ml   Output 1050 ml   Net -90 ml           Physical Exam  Constitutional:       Appearance: Ill appearance: Appears chronically ill.   Cardiovascular:      Rate and Rhythm: Normal rate and regular rhythm.      Pulses: Normal pulses.      Heart sounds: Normal heart sounds.   Pulmonary:      Effort: Pulmonary effort is normal. No respiratory distress.      Breath sounds: Normal breath sounds.   Abdominal:      General: Bowel sounds are normal.      Palpations: Abdomen is soft.   Neurological:      Mental Status: She is alert.             Significant Labs: All pertinent labs within the past 24 hours have been reviewed.  CBC:   Recent Labs   Lab 02/27/24  0423 02/28/24  0653   WBC 15.18* 14.37*   HGB 8.2* 8.0*   HCT 25.5* 25.0*   * 588*       CMP:   Recent Labs   Lab 02/27/24  0423 02/28/24  0653    139   K 4.2 3.8   * 107   CO2 21* 22*   * 303*   BUN 18 19   CREATININE 0.9 0.7   CALCIUM 8.6* 8.5*   PROT 7.3 7.0   ALBUMIN 3.1* 2.8*   BILITOT 0.3 0.3   ALKPHOS 93 87   AST 21 12   ALT 15 14   ANIONGAP 9 10         Significant Imaging: I have reviewed all pertinent imaging results/findings within the past 24 hours.        Assessment/Plan:      * Acute on chronic anemia  GI pathway  Serial H/H  Await Hem/onc opinion - lost to follow up  IV PPI    Frequent hospital admissions  Ongoing issue  Ideally should be under hospice care        Seizure disorder  Maintain keppra PO      PEG (percutaneous endoscopic gastrostomy) status  Aware f.         UTI (urinary tract infection)  Urine culture and started on abx   Yeast noted in culture.     Chronic indwelling Mathur catheter  Aware       Functional quadriplegia  Aware       Leucocytosis  WBC higher than baseline  Improving = 13 lower than has been for a while  Continue iv Zosyn for suspected UTI  Wounds vs urine vs blood  Urine cultures yeast less than 99K  Blood cultures - NGTD  waiting wound consult     Latest Reference Range & Units 12/14/23 05:00 12/15/23 05:51 12/16/23 05:07 12/17/23 04:57 12/18/23 05:03 12/19/23 04:09 02/23/24 14:11   WBC 3.90 - 12.70 K/uL 18.11 (H) 18.90 (H) 17.53 (H) 19.72 (H) 17.48 (H) 15.27 (H) 22.38 (H)   (H): Data is abnormally high      Pressure injury of sacral region, stage 4  Chronic issue  Wound care team consulted   Continue wound care at NH      Thrombocytosis  Pt has ? Myeloproliferative disorder  Details unknown seen by hem/on inpatient in 12/2023 about 2 months ago - no f/u since  Resume Xarelto at discharge per GI - Defer to Hem/Onc  Hopefully will see Dr. Virk on Monday - may be able to defer to outpatient    Impaired functional mobility, balance, gait, and endurance  Aware       Cognitive communication deficit  Aware       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  Creatine stable for now and GFR nomalized. BMP reviewed- noted Estimated Creatinine Clearance: 42.6 mL/min (based on SCr of 1 mg/dL). according to latest data. Based on current GFR, CKD stage is stage 3 - GFR 30-59.  Monitor UOP and serial BMP and adjust therapy as needed. Renally dose meds. Avoid nephrotoxic medications and procedures.    Type 2 diabetes mellitus with both eyes affected by moderate nonproliferative retinopathy and macular edema, with long-term current use of insulin  Last A1c reviewed-   Lab Results   Component Value Date    HGBA1C 7.8 (H) 12/05/2023     Hold home oral  hyperglycemics if indicated - while hospitalized will use combined insulin therapy with basal and prandial insulin coverage, POCT glucose checks, hypoglycemic protocol and moderate correction scale - repeat HgA1c if no record within last 90 days.        VTE Risk Mitigation (From admission, onward)           Ordered     Reason for No Pharmacological VTE Prophylaxis  Once        Question:  Reasons:  Answer:  Risk of Bleeding    02/25/24 0545     Place PAUL hose  Until discontinued         02/24/24 1447     Place sequential compression device  Until discontinued         02/24/24 1447     IP VTE HIGH RISK PATIENT  Once         02/23/24 1532                    Discharge Planning   ALEX: 2/29/2024     Code Status: Full Code   Is the patient medically ready for discharge?:     Reason for patient still in hospital (select all that apply): Patient trending condition, Treatment, and Consult recommendations  Discharge Plan A: New Nursing Home placement - [facility] care facility                  Nanci Dior NP  Department of Hospital Medicine   [facility]

## 2024-02-29 VITALS
HEIGHT: 59 IN | SYSTOLIC BLOOD PRESSURE: 139 MMHG | HEART RATE: 74 BPM | OXYGEN SATURATION: 95 % | RESPIRATION RATE: 20 BRPM | WEIGHT: 119.5 LBS | DIASTOLIC BLOOD PRESSURE: 74 MMHG | TEMPERATURE: 99 F | BODY MASS INDEX: 24.09 KG/M2

## 2024-02-29 LAB
ALBUMIN SERPL BCP-MCNC: 3.1 G/DL (ref 3.5–5.2)
ALP SERPL-CCNC: 90 U/L (ref 55–135)
ALT SERPL W/O P-5'-P-CCNC: 12 U/L (ref 10–44)
ANION GAP SERPL CALC-SCNC: 8 MMOL/L (ref 8–16)
AST SERPL-CCNC: 13 U/L (ref 10–40)
BACTERIA UR CULT: ABNORMAL
BASOPHILS # BLD AUTO: 0.08 K/UL (ref 0–0.2)
BASOPHILS NFR BLD: 0.5 % (ref 0–1.9)
BILIRUB SERPL-MCNC: 0.3 MG/DL (ref 0.1–1)
BUN SERPL-MCNC: 18 MG/DL (ref 8–23)
CALCIUM SERPL-MCNC: 9.1 MG/DL (ref 8.7–10.5)
CHLORIDE SERPL-SCNC: 104 MMOL/L (ref 95–110)
CO2 SERPL-SCNC: 26 MMOL/L (ref 23–29)
CREAT SERPL-MCNC: 0.8 MG/DL (ref 0.5–1.4)
DIFFERENTIAL METHOD BLD: ABNORMAL
EOSINOPHIL # BLD AUTO: 0.3 K/UL (ref 0–0.5)
EOSINOPHIL NFR BLD: 2.1 % (ref 0–8)
ERYTHROCYTE [DISTWIDTH] IN BLOOD BY AUTOMATED COUNT: 21 % (ref 11.5–14.5)
EST. GFR  (NO RACE VARIABLE): >60 ML/MIN/1.73 M^2
GLUCOSE SERPL-MCNC: 156 MG/DL (ref 70–110)
GLUCOSE SERPL-MCNC: 220 MG/DL (ref 70–110)
GLUCOSE SERPL-MCNC: 99 MG/DL (ref 70–110)
HCT VFR BLD AUTO: 29.6 % (ref 37–48.5)
HGB BLD-MCNC: 9.4 G/DL (ref 12–16)
IMM GRANULOCYTES # BLD AUTO: 0.42 K/UL (ref 0–0.04)
IMM GRANULOCYTES NFR BLD AUTO: 2.9 % (ref 0–0.5)
LYMPHOCYTES # BLD AUTO: 2.2 K/UL (ref 1–4.8)
LYMPHOCYTES NFR BLD: 15.3 % (ref 18–48)
MAGNESIUM SERPL-MCNC: 1.9 MG/DL (ref 1.6–2.6)
MCH RBC QN AUTO: 25.5 PG (ref 27–31)
MCHC RBC AUTO-ENTMCNC: 31.8 G/DL (ref 32–36)
MCV RBC AUTO: 80 FL (ref 82–98)
MONOCYTES # BLD AUTO: 1.3 K/UL (ref 0.3–1)
MONOCYTES NFR BLD: 8.9 % (ref 4–15)
NEUTROPHILS # BLD AUTO: 10.3 K/UL (ref 1.8–7.7)
NEUTROPHILS NFR BLD: 70.3 % (ref 38–73)
NRBC BLD-RTO: 0 /100 WBC
PLATELET # BLD AUTO: 612 K/UL (ref 150–450)
PMV BLD AUTO: 10.5 FL (ref 9.2–12.9)
POTASSIUM SERPL-SCNC: 4.3 MMOL/L (ref 3.5–5.1)
PROT SERPL-MCNC: 7.8 G/DL (ref 6–8.4)
RBC # BLD AUTO: 3.69 M/UL (ref 4–5.4)
SODIUM SERPL-SCNC: 138 MMOL/L (ref 136–145)
WBC # BLD AUTO: 14.68 K/UL (ref 3.9–12.7)

## 2024-02-29 PROCEDURE — 36415 COLL VENOUS BLD VENIPUNCTURE: CPT | Performed by: INTERNAL MEDICINE

## 2024-02-29 PROCEDURE — 63600175 PHARM REV CODE 636 W HCPCS: Performed by: INTERNAL MEDICINE

## 2024-02-29 PROCEDURE — 25000003 PHARM REV CODE 250: Performed by: INTERNAL MEDICINE

## 2024-02-29 PROCEDURE — 85025 COMPLETE CBC W/AUTO DIFF WBC: CPT | Performed by: NURSE PRACTITIONER

## 2024-02-29 PROCEDURE — 99232 SBSQ HOSP IP/OBS MODERATE 35: CPT | Mod: ,,, | Performed by: INTERNAL MEDICINE

## 2024-02-29 PROCEDURE — C9113 INJ PANTOPRAZOLE SODIUM, VIA: HCPCS | Performed by: NURSE PRACTITIONER

## 2024-02-29 PROCEDURE — 83735 ASSAY OF MAGNESIUM: CPT | Performed by: INTERNAL MEDICINE

## 2024-02-29 PROCEDURE — 27000221 HC OXYGEN, UP TO 24 HOURS

## 2024-02-29 PROCEDURE — 63600175 PHARM REV CODE 636 W HCPCS: Performed by: NURSE PRACTITIONER

## 2024-02-29 PROCEDURE — 81279 JAK2 GENE TRGT SEQUENCE ALYS: CPT | Performed by: INTERNAL MEDICINE

## 2024-02-29 PROCEDURE — 80053 COMPREHEN METABOLIC PANEL: CPT | Performed by: NURSE PRACTITIONER

## 2024-02-29 PROCEDURE — 25000003 PHARM REV CODE 250: Performed by: NURSE PRACTITIONER

## 2024-02-29 PROCEDURE — 94761 N-INVAS EAR/PLS OXIMETRY MLT: CPT

## 2024-02-29 RX ORDER — NAPROXEN SODIUM 220 MG/1
81 TABLET, FILM COATED ORAL DAILY
Start: 2024-02-29 | End: 2024-03-30

## 2024-02-29 RX ORDER — LEVOFLOXACIN 750 MG/1
750 TABLET ORAL DAILY
Qty: 8 TABLET | Refills: 0
Start: 2024-02-29 | End: 2024-03-08

## 2024-02-29 RX ORDER — CLONIDINE HYDROCHLORIDE 0.1 MG/1
0.1 TABLET ORAL 3 TIMES DAILY
Qty: 90 TABLET | Refills: 11
Start: 2024-02-29 | End: 2025-02-28

## 2024-02-29 RX ORDER — ATORVASTATIN CALCIUM 80 MG/1
80 TABLET, FILM COATED ORAL NIGHTLY
Qty: 30 TABLET | Refills: 0
Start: 2024-02-29 | End: 2024-04-25

## 2024-02-29 RX ORDER — AMLODIPINE BESYLATE 10 MG/1
10 TABLET ORAL DAILY
Start: 2024-02-29 | End: 2025-02-28

## 2024-02-29 RX ORDER — LOSARTAN POTASSIUM 100 MG/1
100 TABLET ORAL DAILY
Start: 2024-02-29 | End: 2025-02-28

## 2024-02-29 RX ADMIN — FUROSEMIDE 40 MG: 40 TABLET ORAL at 09:02

## 2024-02-29 RX ADMIN — METOPROLOL TARTRATE 25 MG: 25 TABLET, FILM COATED ORAL at 09:02

## 2024-02-29 RX ADMIN — LOSARTAN POTASSIUM 50 MG: 50 TABLET, FILM COATED ORAL at 09:02

## 2024-02-29 RX ADMIN — AMLODIPINE BESYLATE 10 MG: 5 TABLET ORAL at 09:02

## 2024-02-29 RX ADMIN — PIPERACILLIN SODIUM AND TAZOBACTAM SODIUM 3.38 G: 3; .375 INJECTION, POWDER, LYOPHILIZED, FOR SOLUTION INTRAVENOUS at 04:02

## 2024-02-29 RX ADMIN — MUPIROCIN 1 G: 20 OINTMENT TOPICAL at 09:02

## 2024-02-29 RX ADMIN — Medication 400 MG: at 09:02

## 2024-02-29 RX ADMIN — LEVETIRACETAM 500 MG: 100 SOLUTION ORAL at 09:02

## 2024-02-29 RX ADMIN — APIXABAN 2.5 MG: 2.5 TABLET, FILM COATED ORAL at 09:02

## 2024-02-29 RX ADMIN — PANTOPRAZOLE SODIUM 40 MG: 40 INJECTION, POWDER, FOR SOLUTION INTRAVENOUS at 09:02

## 2024-02-29 NOTE — DISCHARGE INSTRUCTIONS
[facility]  Facility Transfer Orders        Admit to: NH    Diagnoses:   Active Hospital Problems    Diagnosis  POA    *Acute on chronic anemia [D64.9]  Yes    UTI (urinary tract infection) [N39.0]  Yes    PEG (percutaneous endoscopic gastrostomy) status [Z93.1]  Not Applicable    Seizure disorder [G40.909]  Yes    Frequent hospital admissions [Z78.9]  Yes    Functional quadriplegia [R53.2]  Yes    Chronic indwelling Mathur catheter [Z97.8]  Not Applicable    Leucocytosis [D72.829]  Yes    Pressure injury of sacral region, stage 4 [L89.154]  Yes    Thrombocytosis [D75.839]  Yes    Cognitive communication deficit [R41.841]  Yes    Impaired functional mobility, balance, gait, and endurance [Z74.09]  Yes    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 [N18.30]  Yes    Type 2 diabetes mellitus with both eyes affected by moderate nonproliferative retinopathy and macular edema, with long-term current use of insulin [E11.3313, Z79.4]  Not Applicable      Resolved Hospital Problems   No resolved problems to display.     Allergies: Review of patient's allergies indicates:  No Known Allergies    Code Status: Full    Vitals: Routine       Diet:  NPO;  Tube Feedings: Glucerna 1.2 @ 55cc/hr with Free water flushes 100cc every 4 hours    Activity: Activity as tolerated    Nursing Precautions: Aspiration , Fall, Seizure, and Pressure ulcer prevention    Bed/Surface: Low Air Loss    Consults: Wound Care and Nutrition to evaluate and recommend diet    Oxygen: room air    Dialysis: Patient is not on dialysis.     Labs: none    Pending Diagnostic Studies:       Procedure Component Value Units Date/Time    Flow Cytometry Analysis (Peripheral Blood) [[id number]]     Order Status: Sent Lab Status: No result     Specimen: Blood     JAK2 Exon 12 And Other Non-V617 Mutation Detection, Bld [[id number]]     Order Status: Sent Lab Status: No result     Specimen: Blood           Imaging: none    Miscellaneous Care:   PEG Care:  Clean  site every 24 hours  Mathur Care: Empty Mathur bag every shift.  Change Mathur every month  Routine Skin for Bedridden Patients:  Apply moisture barrier cream to all  Wound Care: yes:  Pressure Ulcer(s) Stage III:  Location: sacral, Right foot eschar, left knee eschar, open wound left leg    Consult ET nurse        Apply the following to wound:   Betadine to the right foot and left knee eschars daily  Pack sacral wound with aquacell Ag and cover with an ABD and secure  Left leg aquacell Ag + mepilex daily     Diabetes Care: Diabetes: Check blood sugar. Fingerstick blood sugar AC and HS  Sliding Scale/Hypoglycemia Protocol: For FSG<80, give 1 amp D50 or 1 glucose tablet. For FSG , do nothing. For -200, give 1 unit of novolog in addition to pre-meal insulin. For -250, give 2 units of novolog in addition to pre-meal insulin. For -300, give 3 units of novolog in addition to pre-meal insulin. For 301-350, give 4 units of novolog in addition to pre-meal insulin. For 351-400, give 5 units of novolog in addition to pre-meal insulin. For FSG >400, give 5 units of novolog in addition to pre-meal insulin and please call MD    IV Access: none     Medications: Discontinue all previous medication orders, if any. See new list below.  Current Discharge Medication List        START taking these medications    Details   levoFLOXacin (LEVAQUIN) 750 MG tablet Take 1 tablet (750 mg total) by mouth once daily. for 8 days  Qty: 8 tablet, Refills: 0           CONTINUE these medications which have CHANGED    Details   amLODIPine (NORVASC) 10 MG tablet 1 tablet (10 mg total) by Per G Tube route once daily.    Comments: .      aspirin 81 MG Chew 1 tablet (81 mg total) by Per G Tube route once daily.    Associated Diagnoses: Cerebrovascular accident (CVA) of left pontine structure      atorvastatin (LIPITOR) 80 MG tablet 1 tablet (80 mg total) by Per G Tube route every evening.  Qty: 30 tablet, Refills: 0    Associated  Diagnoses: Mixed hyperlipidemia      cloNIDine (CATAPRES) 0.1 MG tablet 1 tablet (0.1 mg total) by Per G Tube route 3 (three) times daily.  Qty: 90 tablet, Refills: 11    Comments: .      losartan (COZAAR) 100 MG tablet 1 tablet (100 mg total) by Per G Tube route once daily.    Comments: .           CONTINUE these medications which have NOT CHANGED    Details   allopurinoL (ZYLOPRIM) 100 MG tablet 100 mg by Per G Tube route once daily.      apixaban (ELIQUIS) 2.5 mg Tab 2.5 mg by Per G Tube route 2 (two) times daily.      ascorbic acid, vitamin C, (VITAMIN C) 500 MG tablet 500 mg by Per G Tube route 2 (two) times daily.      folic acid (FOLVITE) 1 MG tablet 1 tablet by Per G Tube route every morning.      furosemide (LASIX) 40 MG tablet 40 mg by Per G Tube route once daily.      HUMALOG KWIKPEN INSULIN 100 unit/mL pen Inject 0-20 Units into the skin As instructed (inject 4 times daily per sliding scale). 0 -149 = 0 units  150 - 199 = 4 units  200 - 249 = 8 units  250 - 299 = 12 units  300 - 349 = 16 units  350 - 1000 = 20 units Call MD      insulin (LANTUS SOLOSTAR U-100 INSULIN) glargine 100 units/mL SubQ pen Inject 35 Units into the skin once daily.      insulin lispro 100 unit/mL injection Inject 2 Units into the skin 3 (three) times daily before meals. 07:30  11:30  16:30      latanoprost 0.005 % ophthalmic solution Place 1 drop into both eyes once daily.  Qty: 7.5 mL, Refills: 6    Associated Diagnoses: Ocular hypertension, bilateral      levETIRAcetam (KEPPRA) 100 mg/mL Soln 500 mg by Per G Tube route 2 (two) times daily.      magnesium oxide (MAG-OX) 400 mg (241.3 mg magnesium) tablet 400 mg by Per G Tube route once daily.      metFORMIN (GLUCOPHAGE) 500 MG tablet 1,000 mg by Per G Tube route 2 (two) times daily with meals.      metoclopramide HCl (REGLAN) 5 mg/5 mL Soln 10 mg by Per G Tube route 3 (three) times daily.      metoprolol tartrate (LOPRESSOR) 25 MG tablet 25 mg by Per G Tube route 2 (two) times  daily.      multivitamin (THERAGRAN) per tablet 1 tablet by Per G Tube route once daily.      omeprazole (PRILOSEC) 20 MG capsule Take 20 mg by mouth once daily. Per G-Tube      polyethylene glycol (GLYCOLAX) 17 gram PwPk 17 g by Per G Tube route once daily.      thiamine (VITAMIN B-1) 100 MG tablet 100 mg by Per G Tube route once daily.      zinc sulfate (ZINC-220 ORAL) 1 tablet by PEG Tube route Daily.      baclofen (LIORESAL) 5 mg Tab tablet 5 mg by Per G Tube route every 8 (eight) hours as needed (muscle spasms).      tiotropium (SPIRIVA) 18 mcg inhalation capsule Inhale 1 capsule (18 mcg total) into the lungs once daily. Controller  Qty: 30 capsule, Refills: 0    Associated Diagnoses: Chronic obstructive pulmonary disease, unspecified COPD type           Follow up:    Follow-up Information       Cristina Ren MD Follow up in 1 week(s).    Specialty: Family Medicine  Contact information:  [id number] [facility] [id number] [phone]                               Immunizations Administered as of 2/29/2024       Name Date Dose VIS Date Route Exp Date    COVID-19, MRNA, LN-S, PF (Moderna) 8/24/2021 -- -- -- --    Lot: [id number]     External: Auto Reconciled From Outside Source     COVID-19, MRNA, LN-S, PF (Moderna) 7/28/2021 -- -- -- --    Lot: [id number]     External: Auto Reconciled From Outside Source     COVID-19, MRNA, LN-S, PF (Pfizer) (Purple Cap) 8/24/2021 0.3 mL 5/10/2021 Intramuscular --    Site: Right deltoid     : Pf[employer] Inc     Lot: [id number]     Comment: Adminis     COVID-19, MRNA, LN-S, PF (Pfizer) (Purple Cap) 8/24/2021 0.3 mL -- Intramuscular --    Site: Right deltoid     : Pfizer Inc     Lot: [id number]     COVID-19, MRNA, LN-S, PF (Pfizer) (Purple Cap) 7/28/2021 0.3 mL 5/10/2021 Intramuscular --    Site: Left deltoid     : Pfizer Inc     Lot: [id number]     Comment: Adminis     COVID-19, MRNA, LN-S, PF (Pfizer) (Purple Cap) 7/28/2021 0.3 mL -- Intramuscular --    Site:  Left deltoid     : Pfizer Inc     Lot: [id number]                   Nanci Dior NP

## 2024-02-29 NOTE — PROGRESS NOTES
[facility]  Hematology/Oncology  Progress Note    Patient Name: Steff Johnson  Admission Date: 2/23/2024  Hospital Length of Stay: 5 days  Code Status: Full Code     Subjective:     HPI:  No new issues today.  Patient opens eyes to questions.  Non-verbal    Interval History:     Oncology Treatment Plan:   [No matching plan found]    Medications:  Continuous Infusions:  Scheduled Meds:   amLODIPine  10 mg Per G Tube Daily    furosemide  40 mg Per G Tube Daily    insulin detemir U-100  20 Units Subcutaneous Daily    levetiracetam  500 mg Per G Tube BID    losartan  50 mg Per G Tube Daily    magnesium oxide  400 mg Per G Tube Daily    metoprolol tartrate  25 mg Per G Tube BID    mupirocin   Nasal BID    pantoprazole  40 mg Intravenous BID    piperacillin-tazobactam (Zosyn) IV (PEDS and ADULTS) (extended infusion is not appropriate)  3.375 g Intravenous Q8H    polyethylene glycol  17 g Per G Tube Daily     PRN Meds:0.9%  NaCl infusion (for blood administration), 0.9%  NaCl infusion (for blood administration), acetaminophen, acetaminophen, aluminum-magnesium hydroxide-simethicone, baclofen, dextrose 50% in water (D50W), dextrose 50% in water (D50W), glucagon (human recombinant), glucose, glucose, insulin aspart U-100, magnesium oxide, magnesium oxide, ondansetron, potassium bicarbonate, potassium bicarbonate, potassium bicarbonate, potassium, sodium phosphates, potassium, sodium phosphates, potassium, sodium phosphates       Objective:     Vital Signs (Most Recent):  Temp: 99.3 °F (37.4 °C) (02/29/24 0358)  Pulse: 78 (02/29/24 0358)  Resp: 18 (02/29/24 0358)  BP: (!) 156/88 (02/29/24 0530)  SpO2: 97 % (02/29/24 0722) Vital Signs (24h Range):  Temp:  [97.9 °F (36.6 °C)-99.6 °F (37.6 °C)] 99.3 °F (37.4 °C)  Pulse:  [74-78] 78  Resp:  [18-19] 18  SpO2:  [93 %-100 %] 97 %  BP: (156-186)/(72-88) 156/88     Weight: 54.2 kg (119 lb 7.8 oz)  Body mass index is 24.13 kg/m².  Body surface area is 1.5 meters  squared.      Intake/Output Summary (Last 24 hours) at 2/29/2024 0800  Last data filed at 2/29/2024 0513  Gross per 24 hour   Intake 960 ml   Output 1650 ml   Net -690 ml        Physical Exam  Constitutional:       Appearance: She is ill-appearing.   HENT:      Head: Normocephalic and atraumatic.   Eyes:      General: No scleral icterus.     Conjunctiva/sclera: Conjunctivae normal.   Cardiovascular:      Rate and Rhythm: Normal rate.   Pulmonary:      Effort: Pulmonary effort is normal.   Abdominal:      General: Abdomen is flat.   Neurological:      Comments: Patient non-verbal,  contracted arms/legs, is awake and tracks with eye contact.           Significant Labs:   CBC:   Recent Labs   Lab 02/28/24  0653 02/29/24  0402   WBC 14.37* 14.68*   HGB 8.0* 9.4*   HCT 25.0* 29.6*   * 612*    and CMP:   Recent Labs   Lab 02/28/24  0653 02/29/24  0402    138   K 3.8 4.3    104   CO2 22* 26   * 99   BUN 19 18   CREATININE 0.7 0.8   CALCIUM 8.5* 9.1   PROT 7.0 7.8   ALBUMIN 2.8* 3.1*   BILITOT 0.3 0.3   ALKPHOS 87 90   AST 12 13   ALT 14 12   ANIONGAP 10 8       Diagnostic Results:  None  Assessment/Plan:     Thrombocytosis  Patient developed the thrombocytosis and leukocytosis in approximately August of 2023.  Her anemia, which is more chronic, has been worsening since that time.  Note is made of angiectasia seen on endoscopy which is also contributing to her anemia.  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  This makes me take pause at recommending a bone marrow biopsy.  Although it could clarify her clinical picture, I don't think I would recommend anything different outside of supportive care.   This being said,  I will get flow cytometry and KERLINE 2 testing to see if this will offer any answers here.  Would continue support with transfusions as needed and  monitor her WBC/Platelets.  I don't think I would direct any therapy at these counts currently.          Thank you for your consult. I will follow-up with patient. Please contact us if you have any additional questions.     Prashanth Maynard MD  Hematology/Oncology  [facility]

## 2024-02-29 NOTE — SUBJECTIVE & OBJECTIVE
Interval History:     Oncology Treatment Plan:   [No matching plan found]    Medications:  Continuous Infusions:  Scheduled Meds:   amLODIPine  10 mg Per G Tube Daily    furosemide  40 mg Per G Tube Daily    insulin detemir U-100  20 Units Subcutaneous Daily    levetiracetam  500 mg Per G Tube BID    losartan  50 mg Per G Tube Daily    magnesium oxide  400 mg Per G Tube Daily    metoprolol tartrate  25 mg Per G Tube BID    mupirocin   Nasal BID    pantoprazole  40 mg Intravenous BID    piperacillin-tazobactam (Zosyn) IV (PEDS and ADULTS) (extended infusion is not appropriate)  3.375 g Intravenous Q8H    polyethylene glycol  17 g Per G Tube Daily     PRN Meds:0.9%  NaCl infusion (for blood administration), 0.9%  NaCl infusion (for blood administration), acetaminophen, acetaminophen, aluminum-magnesium hydroxide-simethicone, baclofen, dextrose 50% in water (D50W), dextrose 50% in water (D50W), glucagon (human recombinant), glucose, glucose, insulin aspart U-100, magnesium oxide, magnesium oxide, ondansetron, potassium bicarbonate, potassium bicarbonate, potassium bicarbonate, potassium, sodium phosphates, potassium, sodium phosphates, potassium, sodium phosphates       Objective:     Vital Signs (Most Recent):  Temp: 99.3 °F (37.4 °C) (02/29/24 0358)  Pulse: 78 (02/29/24 0358)  Resp: 18 (02/29/24 0358)  BP: (!) 156/88 (02/29/24 0530)  SpO2: 97 % (02/29/24 0722) Vital Signs (24h Range):  Temp:  [97.9 °F (36.6 °C)-99.6 °F (37.6 °C)] 99.3 °F (37.4 °C)  Pulse:  [74-78] 78  Resp:  [18-19] 18  SpO2:  [93 %-100 %] 97 %  BP: (156-186)/(72-88) 156/88     Weight: 54.2 kg (119 lb 7.8 oz)  Body mass index is 24.13 kg/m².  Body surface area is 1.5 meters squared.      Intake/Output Summary (Last 24 hours) at 2/29/2024 0800  Last data filed at 2/29/2024 0513  Gross per 24 hour   Intake 960 ml   Output 1650 ml   Net -690 ml        Physical Exam  Constitutional:       Appearance: She is ill-appearing.   HENT:      Head: Normocephalic  and atraumatic.   Eyes:      General: No scleral icterus.     Conjunctiva/sclera: Conjunctivae normal.   Cardiovascular:      Rate and Rhythm: Normal rate.   Pulmonary:      Effort: Pulmonary effort is normal.   Abdominal:      General: Abdomen is flat.   Neurological:      Comments: Patient non-verbal,  contracted arms/legs, is awake and tracks with eye contact.           Significant Labs:   CBC:   Recent Labs   Lab 02/28/24  0653 02/29/24  0402   WBC 14.37* 14.68*   HGB 8.0* 9.4*   HCT 25.0* 29.6*   * 612*    and CMP:   Recent Labs   Lab 02/28/24  0653 02/29/24  0402    138   K 3.8 4.3    104   CO2 22* 26   * 99   BUN 19 18   CREATININE 0.7 0.8   CALCIUM 8.5* 9.1   PROT 7.0 7.8   ALBUMIN 2.8* 3.1*   BILITOT 0.3 0.3   ALKPHOS 87 90   AST 12 13   ALT 14 12   ANIONGAP 10 8       Diagnostic Results:  None

## 2024-02-29 NOTE — DISCHARGE SUMMARY
[facility] Medicine  Discharge Summary      Patient Name: Steff Johnson  MRN: [id number]  [facility]: [id number]  Patient Class: IP- Inpatient  Admission Date: 2/23/2024  Hospital Length of Stay: 5 days  Discharge Date and Time: 2/29/2024  4:46 PM  Attending Physician: Octavio Brady Jr., MD   Discharging Provider: Nanci Dior NP  Primary Care Provider: Cristina Ren MD    Primary Care Team: Networked reference to record PCT     HPI:   64 year old pt getting transferred from facility with H/o weakness/ Low H/H and bladimir  Pt is bed bound and aphasic and has History of frequent hospitalizations  Random labs were obtained in facility and pt was found to be anemic  Also reports of dark stools too  Transferred to ER and got admitted  Further History unavailable       Procedure(s) (LRB):  EGD (ESOPHAGOGASTRODUODENOSCOPY) (N/A)      Hospital Course:   64 y.o. F transferred from nursing facility with complaints of melena.  Patient had positive Hemoccult with initial hemoglobin of 7.7.  In early morning hemoglobin rechecked and found to be 6.2 repeat = 6.0.  Consent received from patient's family feel witnessed nursing + NP over the phone for blood transfusion. Wound care consulted (wounds), Hem/onc (acute on chronic thrombocytosis), GI (OB positive, hg 7.7-->6). Patient was transfused 1 unit PRBC and GI performed EGD with finding of significant for a single angioectasia in the duodenum that was treated with argon plasma coagulation. Recommended resuming Eliquis at prior dose and she was cleared patient from GI standpoint for discharge.  Her hemoglobin hematocrit remained stable. Patient's white count improved on IV antibiotics.  Patient was evaluated by Hematology.  She was not found to be a candidate for any directed therapy however, flow cytometry and JAK2 testing was ordered.  Supportive care was given.  She was restarted on her home Eliquis.  She was medically stable for discharge back to  her nursing home.  Patient was seen and evaluated on day of discharge and deemed appropriate.     Goals of Care Treatment Preferences:  Code Status: Full Code          What is most important right now is to focus on avoiding the hospital, remaining as independent as possible, symptom/pain control, improvement in condition but with limits to invasive therapies.  Accordingly, we have decided that the best plan to meet the patient's goals includes continuing with treatment.      Consults:   Consults (From admission, onward)          Status Ordering Provider     Inpatient consult to Hematology Oncology  Once        Provider:  Prashanth Virk MD    Acknowledged RUI NORTH     Inpatient consult to Registered Dietitian/Nutritionist  Once        Provider:  (Not yet assigned)    Completed KAREN YAO     Inpatient consult to Gastroenterology  Once        Provider:  Alexandru May MD    Acknowledged KAREN YAO     Inpatient consult to Hematology Oncology  Once        Provider:  Tom Duncan MD    Acknowledged KAREN YAO     Inpatient consult to Gastroenterology  Once        Provider:  Alexandru May MD    Completed REBECA LAMAS     Inpatient consult to Registered Dietitian/Nutritionist  Once        Provider:  (Not yet assigned)    Completed REBECA LAMAS            No new Assessment & Plan notes have been filed under this hospital service since the last note was generated.  Service: Hospital Medicine    Final Active Diagnoses:    Diagnosis Date Noted POA    PRINCIPAL PROBLEM:  Acute on chronic anemia [D64.9] 02/23/2024 Yes    UTI (urinary tract infection) [N39.0] 02/23/2024 Yes    PEG (percutaneous endoscopic gastrostomy) status [Z93.1] 02/23/2024 Not Applicable    Seizure disorder [G40.909] 02/23/2024 Yes    Frequent hospital admissions [Z78.9] 02/23/2024 Yes    Functional quadriplegia [R53.2] 12/07/2023 Yes    Chronic indwelling Mathur catheter [Z97.8] 12/07/2023 Not Applicable     Leucocytosis [D72.829] 12/06/2023 Yes    Pressure injury of sacral region, stage 4 [L89.154] 10/25/2023 Yes    Thrombocytosis [D75.839] 10/25/2023 Yes    Cognitive communication deficit [R41.841] 05/31/2023 Yes    Impaired functional mobility, balance, gait, and endurance [Z74.09] 05/31/2023 Yes    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    Specialty: Family Medicine  Contact information:  [address] [facility] [address] [phone]                           Patient Instructions:      Activity as tolerated       Significant Diagnostic Studies: Labs: CMP   Recent Labs   Lab 02/28/24  0653 02/29/24  0402    138   K 3.8 4.3    104   CO2 22* 26   * 99   BUN 19 18   CREATININE 0.7 0.8   CALCIUM 8.5* 9.1   PROT 7.0 7.8   ALBUMIN 2.8* 3.1*   BILITOT 0.3 0.3   ALKPHOS 87 90   AST 12 13   ALT 14 12   ANIONGAP 10 8    and CBC   Recent Labs   Lab 02/28/24  0653 02/29/24  0402   WBC 14.37* 14.68*   HGB 8.0* 9.4*   HCT 25.0* 29.6*   * 612*       Pending Diagnostic Studies:       Procedure Component Value Units Date/Time    Flow Cytometry Analysis (Peripheral Blood) [[id number]]     Order Status: Sent Lab Status: No result     Specimen: Blood     JAK2 Exon 12 And Other Non-V617 Mutation Detection, Bld [[id number]] Collected: 02/29/24 0402    Order Status: Sent Lab Status: In process Updated: 02/29/24 1327    Specimen: Blood            Medications:  Reconciled Home Medications:      Medication List        START taking these medications      levoFLOXacin 750 MG tablet  Commonly  known as: LEVAQUIN  Take 1 tablet (750 mg total) by mouth once daily. for 8 days            CHANGE how you take these medications      cloNIDine 0.1 MG tablet  Commonly known as: CATAPRES  1 tablet (0.1 mg total) by Per G Tube route 3 (three) times daily.  What changed: how to take this     latanoprost 0.005 % ophthalmic solution  Place 1 drop into both eyes once daily.  What changed: when to take this            CONTINUE taking these medications      allopurinoL 100 MG tablet  Commonly known as: ZYLOPRIM  100 mg by Per G Tube route once daily.     amLODIPine 10 MG tablet  Commonly known as: NORVASC  1 tablet (10 mg total) by Per G Tube route once daily.     ascorbic acid (vitamin C) 500 MG tablet  Commonly known as: VITAMIN C  500 mg by Per G Tube route 2 (two) times daily.     aspirin 81 MG Chew  1 tablet (81 mg total) by Per G Tube route once daily.     atorvastatin 80 MG tablet  Commonly known as: LIPITOR  1 tablet (80 mg total) by Per G Tube route every evening.     baclofen 5 mg Tab tablet  Commonly known as: LIORESAL  5 mg by Per G Tube route every 8 (eight) hours as needed (muscle spasms).     ELIQUIS 2.5 mg Tab  Generic drug: apixaban  2.5 mg by Per G Tube route 2 (two) times daily.     folic acid 1 MG tablet  Commonly known as: FOLVITE  1 tablet by Per G Tube route every morning.     furosemide 40 MG tablet  Commonly known as: LASIX  40 mg by Per G Tube route once daily.     * insulin lispro 100 unit/mL injection  Inject 2 Units into the skin 3 (three) times daily before meals. 07:30  11:30  16:30     * HumaLOG KwikPen Insulin 100 unit/mL pen  Generic drug: insulin lispro  Inject 0-20 Units into the skin As instructed (inject 4 times daily per sliding scale). 0 -149 = 0 units  150 - 199 = 4 units  200 - 249 = 8 units  250 - 299 = 12 units  300 - 349 = 16 units  350 - 1000 = 20 units Call MD ANTOLIN GILES U-100 INSULIN glargine 100 units/mL SubQ pen  Generic drug: insulin  Inject 35 Units into the  skin once daily.     levETIRAcetam 100 mg/mL Soln  Commonly known as: KEPPRA  500 mg by Per G Tube route 2 (two) times daily.     losartan 100 MG tablet  Commonly known as: COZAAR  1 tablet (100 mg total) by Per G Tube route once daily.     magnesium oxide 400 mg (241.3 mg magnesium) tablet  Commonly known as: MAG-OX  400 mg by Per G Tube route once daily.     metFORMIN 500 MG tablet  Commonly known as: GLUCOPHAGE  1,000 mg by Per G Tube route 2 (two) times daily with meals.     metoclopramide HCl 5 mg/5 mL Soln  Commonly known as: REGLAN  10 mg by Per G Tube route 3 (three) times daily.     metoprolol tartrate 25 MG tablet  Commonly known as: LOPRESSOR  25 mg by Per G Tube route 2 (two) times daily.     multivitamin per tablet  Commonly known as: THERAGRAN  1 tablet by Per G Tube route once daily.     omeprazole 20 MG capsule  Commonly known as: PRILOSEC  Take 20 mg by mouth once daily. Per G-Tube     polyethylene glycol 17 gram Pwpk  Commonly known as: GLYCOLAX  17 g by Per G Tube route once daily.     SPIRIVA WITH HANDIHALER 18 mcg inhalation capsule  Generic drug: tiotropium  Inhale 1 capsule (18 mcg total) into the lungs once daily. Controller     VITAMIN B-1 100 MG tablet  Generic drug: thiamine  100 mg by Per G Tube route once daily.     ZINC-220 ORAL  1 tablet by PEG Tube route Daily.           * This list has 2 medication(s) that are the same as other medications prescribed for you. Read the directions carefully, and ask your doctor or other care provider to review them with you.                  Indwelling Lines/Drains at time of discharge:   Lines/Drains/Airways       Drain  Duration                  Gastrostomy/Enterostomy 01/09/24 0550 Gastrostomy tube w/ balloon LUQ 51 days         Urethral Catheter 02/15/24 2336 13 days                    Time spent on the discharge of patient: 38 minutes         Nanci Dior NP  Department of Hospital Medicine  [facility]

## 2024-02-29 NOTE — CARE UPDATE
02/28/24 2940   Patient Assessment/Suction   Level of Consciousness (AVPU) responds to voice   Respiratory Effort Unlabored   Expansion/Accessory Muscles/Retractions no use of accessory muscles   All Lung Fields Breath Sounds clear;diminished   Rhythm/Pattern, Respiratory no shortness of breath reported   Cough Frequency no cough   PRE-TX-O2   Device (Oxygen Therapy) nasal cannula   $ Is the patient on Low Flow Oxygen? Yes   Flow (L/min) 0.5   SpO2 100 %   Pulse Oximetry Type Intermittent   $ Pulse Oximetry - Multiple Charge Pulse Oximetry - Multiple   Positioning   Head of Bed (HOB) Positioning HOB elevated;HOB at 30 degrees

## 2024-02-29 NOTE — PLAN OF CARE
Pt cleared for discharge from case management to [facility]    Call report to Diana at [facility] [phone] and [facility] will arrange transportation, per Nick at [facility].     02/29/24 4682   Final Note   Assessment Type Final Discharge Note   Anticipated Discharge Disposition [facility]   Post-Acute Status   Post-Acute Authorization Placement   Post-Acute Placement Status Set-up Complete/Auth obtained

## 2024-02-29 NOTE — PROGRESS NOTES
[facility] at 3:03pm stated Leonie Johnson, patient's daughter, request to transfer patient to another nursing home.     Sent today, clinicals to other [facility] via [facility]    10:58am - sent [facility] [place]    11:08am - called daughter, Leonie Johnson [phone] and informed of dc back to [facility] today    11:14am & 12:39pm- Called and informed Nick at [facility] of return

## 2024-02-29 NOTE — ASSESSMENT & PLAN NOTE
Patient developed the thrombocytosis and leukocytosis in approximately August of 2023.  Her anemia, which is more chronic, has been worsening since that time.  Note is made of angiectasia seen on endoscopy which is also contributing to her anemia.  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  This makes me take pause at recommending a bone marrow biopsy.  Although it could clarify her clinical picture, I don't think I would recommend anything different outside of supportive care.   This being said,  I will get flow cytometry and KERLINE 2 testing to see if this will offer any answers here.  Would continue support with transfusions as needed and monitor her WBC/Platelets.  I don't think I would direct any therapy at these counts currently.

## 2024-03-11 PROBLEM — I63.9 CVA (CEREBRAL VASCULAR ACCIDENT): Status: RESOLVED | Noted: 2019-12-12 | Resolved: 2024-03-11

## 2024-03-13 LAB
AMINO ACID CHANGE: NORMAL
CITATION REF LAB TEST: NORMAL
DNA CHANGE: NORMAL
JAK2 GENE MUT ANL BLD/T: NORMAL
LAB DIRECTOR NAME PROVIDER: NORMAL
LABORATORY COMMENT REPORT: NORMAL
Lab: NORMAL
REF LAB TEST METHOD: NORMAL

## 2024-04-25 ENCOUNTER — HOSPITAL ENCOUNTER (INPATIENT)
Facility: HOSPITAL | Age: 64
LOS: 13 days | Discharge: HOME | DRG: 698 | End: 2024-05-08
Attending: EMERGENCY MEDICINE | Admitting: HOSPITALIST
Payer: MEDICARE

## 2024-04-25 DIAGNOSIS — R78.81 COAG NEGATIVE STAPHYLOCOCCUS BACTEREMIA: Primary | ICD-10-CM

## 2024-04-25 DIAGNOSIS — N17.9 AKI (ACUTE KIDNEY INJURY): ICD-10-CM

## 2024-04-25 DIAGNOSIS — K29.51 GASTROINTESTINAL HEMORRHAGE ASSOCIATED WITH CHRONIC GASTRITIS: ICD-10-CM

## 2024-04-25 DIAGNOSIS — A41.9 SEPSIS: ICD-10-CM

## 2024-04-25 DIAGNOSIS — K92.2 GASTROINTESTINAL HEMORRHAGE, UNSPECIFIED GASTROINTESTINAL HEMORRHAGE TYPE: ICD-10-CM

## 2024-04-25 DIAGNOSIS — D75.839 THROMBOCYTOSIS: ICD-10-CM

## 2024-04-25 DIAGNOSIS — D72.823: ICD-10-CM

## 2024-04-25 DIAGNOSIS — B95.7 COAG NEGATIVE STAPHYLOCOCCUS BACTEREMIA: Primary | ICD-10-CM

## 2024-04-25 DIAGNOSIS — G82.50 QUADRIPLEGIA: Chronic | ICD-10-CM

## 2024-04-25 DIAGNOSIS — A41.9 SEVERE SEPSIS: ICD-10-CM

## 2024-04-25 DIAGNOSIS — R65.20 SEVERE SEPSIS: ICD-10-CM

## 2024-04-25 DIAGNOSIS — R07.9 CHEST PAIN: ICD-10-CM

## 2024-04-25 DIAGNOSIS — R47.01 APHASIA: ICD-10-CM

## 2024-04-25 DIAGNOSIS — R06.02 SOB (SHORTNESS OF BREATH): ICD-10-CM

## 2024-04-25 DIAGNOSIS — Z01.89 ENCOUNTER FOR IMAGING STUDY TO CONFIRM NASOGASTRIC (NG) TUBE PLACEMENT: ICD-10-CM

## 2024-04-25 DIAGNOSIS — R78.81 BACTEREMIA: ICD-10-CM

## 2024-04-25 PROBLEM — N39.0 COMPLICATED UTI (URINARY TRACT INFECTION): Status: ACTIVE | Noted: 2024-04-25

## 2024-04-25 PROBLEM — E11.49 TYPE 2 DIABETES MELLITUS WITH NEUROLOGIC COMPLICATION, WITH LONG-TERM CURRENT USE OF INSULIN: Status: ACTIVE | Noted: 2024-04-25

## 2024-04-25 PROBLEM — I63.9 CVA (CEREBRAL VASCULAR ACCIDENT): Status: ACTIVE | Noted: 2024-04-25

## 2024-04-25 PROBLEM — A49.8 CLOSTRIDIUM DIFFICILE INFECTION: Status: ACTIVE | Noted: 2024-04-25

## 2024-04-25 PROBLEM — E87.5 HYPERKALEMIA: Status: ACTIVE | Noted: 2024-04-25

## 2024-04-25 PROBLEM — R31.9 HEMATURIA: Status: ACTIVE | Noted: 2024-04-25

## 2024-04-25 PROBLEM — R56.9 SEIZURE: Status: ACTIVE | Noted: 2024-04-25

## 2024-04-25 PROBLEM — D62 ACUTE BLOOD LOSS ANEMIA: Status: ACTIVE | Noted: 2024-04-25

## 2024-04-25 PROBLEM — I10 HYPERTENSION: Status: ACTIVE | Noted: 2024-04-25

## 2024-04-25 PROBLEM — Z79.4 TYPE 2 DIABETES MELLITUS WITH NEUROLOGIC COMPLICATION, WITH LONG-TERM CURRENT USE OF INSULIN: Status: ACTIVE | Noted: 2024-04-25

## 2024-04-25 LAB
ABO + RH BLD: NORMAL
ALBUMIN SERPL BCP-MCNC: 3.3 G/DL (ref 3.5–5.2)
ALP SERPL-CCNC: 89 U/L (ref 55–135)
ALT SERPL W/O P-5'-P-CCNC: 15 U/L (ref 10–44)
ANION GAP SERPL CALC-SCNC: 19 MMOL/L (ref 8–16)
ANION GAP SERPL CALC-SCNC: 20 MMOL/L (ref 8–16)
ANISOCYTOSIS BLD QL SMEAR: SLIGHT
AST SERPL-CCNC: 18 U/L (ref 10–40)
B-OH-BUTYR BLD STRIP-SCNC: 0.2 MMOL/L (ref 0–0.5)
BACTERIA #/AREA URNS HPF: ABNORMAL /HPF
BASOPHILS # BLD AUTO: 0.05 K/UL (ref 0–0.2)
BASOPHILS NFR BLD: 0 % (ref 0–1.9)
BASOPHILS NFR BLD: 0.2 % (ref 0–1.9)
BILIRUB SERPL-MCNC: 0.6 MG/DL (ref 0.1–1)
BILIRUB UR QL STRIP: NEGATIVE
BLD GP AB SCN CELLS X3 SERPL QL: NORMAL
BUN SERPL-MCNC: 84 MG/DL (ref 8–23)
BUN SERPL-MCNC: 93 MG/DL (ref 8–23)
C DIFF GDH STL QL: POSITIVE
C DIFF TOX A+B STL QL IA: NEGATIVE
C DIFF TOX GENS STL QL NAA+PROBE: NEGATIVE
CALCIUM SERPL-MCNC: 7.4 MG/DL (ref 8.7–10.5)
CALCIUM SERPL-MCNC: 9.7 MG/DL (ref 8.7–10.5)
CHLORIDE SERPL-SCNC: 104 MMOL/L (ref 95–110)
CHLORIDE SERPL-SCNC: 96 MMOL/L (ref 95–110)
CLARITY UR: ABNORMAL
CO2 SERPL-SCNC: 11 MMOL/L (ref 23–29)
CO2 SERPL-SCNC: 18 MMOL/L (ref 23–29)
COLOR UR: ABNORMAL
CREAT SERPL-MCNC: 2.5 MG/DL (ref 0.5–1.4)
CREAT SERPL-MCNC: 2.7 MG/DL (ref 0.5–1.4)
DIFFERENTIAL METHOD BLD: ABNORMAL
DIFFERENTIAL METHOD BLD: ABNORMAL
EOSINOPHIL # BLD AUTO: 0.2 K/UL (ref 0–0.5)
EOSINOPHIL NFR BLD: 0 % (ref 0–8)
EOSINOPHIL NFR BLD: 0.6 % (ref 0–8)
ERYTHROCYTE [DISTWIDTH] IN BLOOD BY AUTOMATED COUNT: 18.7 % (ref 11.5–14.5)
ERYTHROCYTE [DISTWIDTH] IN BLOOD BY AUTOMATED COUNT: 18.8 % (ref 11.5–14.5)
EST. GFR  (NO RACE VARIABLE): 18.9 ML/MIN/1.73 M^2
EST. GFR  (NO RACE VARIABLE): 20.7 ML/MIN/1.73 M^2
GLUCOSE SERPL-MCNC: 210 MG/DL (ref 70–110)
GLUCOSE SERPL-MCNC: 214 MG/DL (ref 70–110)
GLUCOSE SERPL-MCNC: 302 MG/DL (ref 70–110)
GLUCOSE SERPL-MCNC: 323 MG/DL (ref 70–110)
GLUCOSE SERPL-MCNC: 337 MG/DL (ref 70–110)
GLUCOSE UR QL STRIP: NEGATIVE
HCT VFR BLD AUTO: 22.3 % (ref 37–48.5)
HCT VFR BLD AUTO: 27 % (ref 37–48.5)
HGB BLD-MCNC: 6.2 G/DL (ref 12–16)
HGB BLD-MCNC: 7.3 G/DL (ref 12–16)
HGB BLD-MCNC: 8.8 G/DL (ref 12–16)
HGB UR QL STRIP: ABNORMAL
HYALINE CASTS #/AREA URNS LPF: 0 /LPF
HYPOCHROMIA BLD QL SMEAR: ABNORMAL
IMM GRANULOCYTES # BLD AUTO: 0.28 K/UL (ref 0–0.04)
IMM GRANULOCYTES # BLD AUTO: ABNORMAL K/UL (ref 0–0.04)
IMM GRANULOCYTES NFR BLD AUTO: 1.1 % (ref 0–0.5)
IMM GRANULOCYTES NFR BLD AUTO: ABNORMAL % (ref 0–0.5)
KETONES UR QL STRIP: NEGATIVE
LACTATE SERPL-SCNC: 4.9 MMOL/L (ref 0.5–1.9)
LACTATE SERPL-SCNC: 6.1 MMOL/L (ref 0.5–1.9)
LACTATE SERPL-SCNC: 6.3 MMOL/L (ref 0.5–1.9)
LEUKOCYTE ESTERASE UR QL STRIP: ABNORMAL
LIPASE SERPL-CCNC: 16 U/L (ref 4–60)
LYMPHOCYTES # BLD AUTO: 2.3 K/UL (ref 1–4.8)
LYMPHOCYTES NFR BLD: 8 % (ref 18–48)
LYMPHOCYTES NFR BLD: 9.3 % (ref 18–48)
MAGNESIUM SERPL-MCNC: 2.2 MG/DL (ref 1.6–2.6)
MCH RBC QN AUTO: 25.8 PG (ref 27–31)
MCH RBC QN AUTO: 26.4 PG (ref 27–31)
MCHC RBC AUTO-ENTMCNC: 32.6 G/DL (ref 32–36)
MCHC RBC AUTO-ENTMCNC: 32.7 G/DL (ref 32–36)
MCV RBC AUTO: 79 FL (ref 82–98)
MCV RBC AUTO: 81 FL (ref 82–98)
METAMYELOCYTES NFR BLD MANUAL: 7 %
MICROSCOPIC COMMENT: ABNORMAL
MONOCYTES # BLD AUTO: 1.5 K/UL (ref 0.3–1)
MONOCYTES NFR BLD: 5 % (ref 4–15)
MONOCYTES NFR BLD: 5.8 % (ref 4–15)
MYELOCYTES NFR BLD MANUAL: 2 %
NEUTROPHILS # BLD AUTO: 20.7 K/UL (ref 1.8–7.7)
NEUTROPHILS NFR BLD: 55 % (ref 38–73)
NEUTROPHILS NFR BLD: 83 % (ref 38–73)
NEUTS BAND NFR BLD MANUAL: 23 %
NITRITE UR QL STRIP: NEGATIVE
NRBC BLD-RTO: 0 /100 WBC
NRBC BLD-RTO: 0 /100 WBC
OB PNL STL: POSITIVE
OHS QRS DURATION: 70 MS
OHS QTC CALCULATION: 454 MS
PH UR STRIP: >8 [PH] (ref 5–8)
PHOSPHATE SERPL-MCNC: 3.8 MG/DL (ref 2.7–4.5)
PLATELET # BLD AUTO: 560 K/UL (ref 150–450)
PLATELET # BLD AUTO: 818 K/UL (ref 150–450)
PLATELET BLD QL SMEAR: ABNORMAL
PMV BLD AUTO: 10.7 FL (ref 9.2–12.9)
PMV BLD AUTO: 10.7 FL (ref 9.2–12.9)
POTASSIUM SERPL-SCNC: 4.1 MMOL/L (ref 3.5–5.1)
POTASSIUM SERPL-SCNC: 5.9 MMOL/L (ref 3.5–5.1)
PROT SERPL-MCNC: 8 G/DL (ref 6–8.4)
PROT UR QL STRIP: ABNORMAL
RBC # BLD AUTO: 2.76 M/UL (ref 4–5.4)
RBC # BLD AUTO: 3.41 M/UL (ref 4–5.4)
RBC #/AREA URNS HPF: >100 /HPF (ref 0–4)
SODIUM SERPL-SCNC: 133 MMOL/L (ref 136–145)
SODIUM SERPL-SCNC: 135 MMOL/L (ref 136–145)
SP GR UR STRIP: 1.01 (ref 1–1.03)
SPECIMEN OUTDATE: NORMAL
SPHEROCYTES BLD QL SMEAR: ABNORMAL
TARGETS BLD QL SMEAR: ABNORMAL
TRI-PHOS CRY URNS QL MICRO: ABNORMAL
UNIDENT CRYS URNS QL MICRO: ABNORMAL
URN SPEC COLLECT METH UR: ABNORMAL
UROBILINOGEN UR STRIP-ACNC: NEGATIVE EU/DL
WBC # BLD AUTO: 24.95 K/UL (ref 3.9–12.7)
WBC # BLD AUTO: 28.38 K/UL (ref 3.9–12.7)
WBC #/AREA STL HPF: NORMAL /[HPF]
WBC #/AREA URNS HPF: >100 /HPF (ref 0–5)
WBC CLUMPS URNS QL MICRO: ABNORMAL

## 2024-04-25 PROCEDURE — 63600175 PHARM REV CODE 636 W HCPCS: Mod: JZ,JG | Performed by: NURSE PRACTITIONER

## 2024-04-25 PROCEDURE — 85027 COMPLETE CBC AUTOMATED: CPT | Performed by: NURSE PRACTITIONER

## 2024-04-25 PROCEDURE — 87449 NOS EACH ORGANISM AG IA: CPT | Mod: 91 | Performed by: NURSE PRACTITIONER

## 2024-04-25 PROCEDURE — 63600175 PHARM REV CODE 636 W HCPCS: Performed by: HOSPITALIST

## 2024-04-25 PROCEDURE — 84100 ASSAY OF PHOSPHORUS: CPT | Performed by: NURSE PRACTITIONER

## 2024-04-25 PROCEDURE — 83735 ASSAY OF MAGNESIUM: CPT | Performed by: NURSE PRACTITIONER

## 2024-04-25 PROCEDURE — 86920 COMPATIBILITY TEST SPIN: CPT | Performed by: HOSPITALIST

## 2024-04-25 PROCEDURE — P9040 RBC LEUKOREDUCED IRRADIATED: HCPCS | Performed by: HOSPITALIST

## 2024-04-25 PROCEDURE — 93010 ELECTROCARDIOGRAM REPORT: CPT | Mod: ,,, | Performed by: GENERAL PRACTICE

## 2024-04-25 PROCEDURE — 87040 BLOOD CULTURE FOR BACTERIA: CPT | Performed by: NURSE PRACTITIONER

## 2024-04-25 PROCEDURE — 96365 THER/PROPH/DIAG IV INF INIT: CPT

## 2024-04-25 PROCEDURE — 30000890 LABCORP MISCELLANEOUS TEST: Performed by: NURSE PRACTITIONER

## 2024-04-25 PROCEDURE — 99292 CRITICAL CARE ADDL 30 MIN: CPT

## 2024-04-25 PROCEDURE — 83690 ASSAY OF LIPASE: CPT | Performed by: NURSE PRACTITIONER

## 2024-04-25 PROCEDURE — 94644 CONT INHLJ TX 1ST HOUR: CPT

## 2024-04-25 PROCEDURE — 82010 KETONE BODYS QUAN: CPT | Performed by: HOSPITALIST

## 2024-04-25 PROCEDURE — 89055 LEUKOCYTE ASSESSMENT FECAL: CPT | Performed by: NURSE PRACTITIONER

## 2024-04-25 PROCEDURE — 99900031 HC PATIENT EDUCATION (STAT)

## 2024-04-25 PROCEDURE — 96375 TX/PRO/DX INJ NEW DRUG ADDON: CPT

## 2024-04-25 PROCEDURE — 94761 N-INVAS EAR/PLS OXIMETRY MLT: CPT

## 2024-04-25 PROCEDURE — 25000003 PHARM REV CODE 250: Performed by: HOSPITALIST

## 2024-04-25 PROCEDURE — 36415 COLL VENOUS BLD VENIPUNCTURE: CPT | Performed by: HOSPITALIST

## 2024-04-25 PROCEDURE — 87493 C DIFF AMPLIFIED PROBE: CPT | Performed by: NURSE PRACTITIONER

## 2024-04-25 PROCEDURE — 21000000 HC CCU ICU ROOM CHARGE

## 2024-04-25 PROCEDURE — 63600175 PHARM REV CODE 636 W HCPCS: Performed by: EMERGENCY MEDICINE

## 2024-04-25 PROCEDURE — 85025 COMPLETE CBC W/AUTO DIFF WBC: CPT | Performed by: NURSE PRACTITIONER

## 2024-04-25 PROCEDURE — 85018 HEMOGLOBIN: CPT | Performed by: HOSPITALIST

## 2024-04-25 PROCEDURE — 87154 CUL TYP ID BLD PTHGN 6+ TRGT: CPT | Performed by: NURSE PRACTITIONER

## 2024-04-25 PROCEDURE — 80048 BASIC METABOLIC PNL TOTAL CA: CPT | Performed by: HOSPITALIST

## 2024-04-25 PROCEDURE — 96367 TX/PROPH/DG ADDL SEQ IV INF: CPT

## 2024-04-25 PROCEDURE — 87449 NOS EACH ORGANISM AG IA: CPT | Performed by: NURSE PRACTITIONER

## 2024-04-25 PROCEDURE — 87046 STOOL CULTR AEROBIC BACT EA: CPT | Performed by: NURSE PRACTITIONER

## 2024-04-25 PROCEDURE — 87209 SMEAR COMPLEX STAIN: CPT | Performed by: NURSE PRACTITIONER

## 2024-04-25 PROCEDURE — 83605 ASSAY OF LACTIC ACID: CPT | Mod: 91 | Performed by: HOSPITALIST

## 2024-04-25 PROCEDURE — 81001 URINALYSIS AUTO W/SCOPE: CPT | Performed by: NURSE PRACTITIONER

## 2024-04-25 PROCEDURE — 36415 COLL VENOUS BLD VENIPUNCTURE: CPT | Performed by: NURSE PRACTITIONER

## 2024-04-25 PROCEDURE — 30233N1 TRANSFUSION OF NONAUTOLOGOUS RED BLOOD CELLS INTO PERIPHERAL VEIN, PERCUTANEOUS APPROACH: ICD-10-PCS | Performed by: HOSPITALIST

## 2024-04-25 PROCEDURE — 83605 ASSAY OF LACTIC ACID: CPT | Mod: 91 | Performed by: NURSE PRACTITIONER

## 2024-04-25 PROCEDURE — 96376 TX/PRO/DX INJ SAME DRUG ADON: CPT

## 2024-04-25 PROCEDURE — 63600175 PHARM REV CODE 636 W HCPCS: Performed by: NURSE PRACTITIONER

## 2024-04-25 PROCEDURE — 93005 ELECTROCARDIOGRAM TRACING: CPT | Performed by: GENERAL PRACTICE

## 2024-04-25 PROCEDURE — 82962 GLUCOSE BLOOD TEST: CPT

## 2024-04-25 PROCEDURE — 85007 BL SMEAR W/DIFF WBC COUNT: CPT | Performed by: NURSE PRACTITIONER

## 2024-04-25 PROCEDURE — 82272 OCCULT BLD FECES 1-3 TESTS: CPT | Performed by: NURSE PRACTITIONER

## 2024-04-25 PROCEDURE — 80053 COMPREHEN METABOLIC PANEL: CPT | Performed by: NURSE PRACTITIONER

## 2024-04-25 PROCEDURE — 36430 TRANSFUSION BLD/BLD COMPNT: CPT

## 2024-04-25 PROCEDURE — 86901 BLOOD TYPING SEROLOGIC RH(D): CPT | Performed by: HOSPITALIST

## 2024-04-25 PROCEDURE — 87086 URINE CULTURE/COLONY COUNT: CPT | Performed by: NURSE PRACTITIONER

## 2024-04-25 PROCEDURE — 99291 CRITICAL CARE FIRST HOUR: CPT

## 2024-04-25 PROCEDURE — 25000003 PHARM REV CODE 250: Mod: JZ,JG | Performed by: NURSE PRACTITIONER

## 2024-04-25 PROCEDURE — 25000242 PHARM REV CODE 250 ALT 637 W/ HCPCS: Performed by: NURSE PRACTITIONER

## 2024-04-25 PROCEDURE — 51702 INSERT TEMP BLADDER CATH: CPT

## 2024-04-25 PROCEDURE — C9113 INJ PANTOPRAZOLE SODIUM, VIA: HCPCS | Performed by: EMERGENCY MEDICINE

## 2024-04-25 PROCEDURE — 94640 AIRWAY INHALATION TREATMENT: CPT

## 2024-04-25 PROCEDURE — 87177 OVA AND PARASITES SMEARS: CPT | Performed by: NURSE PRACTITIONER

## 2024-04-25 PROCEDURE — 25000003 PHARM REV CODE 250: Performed by: EMERGENCY MEDICINE

## 2024-04-25 PROCEDURE — 87077 CULTURE AEROBIC IDENTIFY: CPT | Performed by: NURSE PRACTITIONER

## 2024-04-25 PROCEDURE — 87186 SC STD MICRODIL/AGAR DIL: CPT | Mod: 59 | Performed by: NURSE PRACTITIONER

## 2024-04-25 PROCEDURE — 87045 FECES CULTURE AEROBIC BACT: CPT | Performed by: NURSE PRACTITIONER

## 2024-04-25 RX ORDER — SODIUM,POTASSIUM PHOSPHATES 280-250MG
2 POWDER IN PACKET (EA) ORAL
Status: DISCONTINUED | OUTPATIENT
Start: 2024-04-25 | End: 2024-05-08 | Stop reason: HOSPADM

## 2024-04-25 RX ORDER — ONDANSETRON HYDROCHLORIDE 2 MG/ML
4 INJECTION, SOLUTION INTRAVENOUS
Status: COMPLETED | OUTPATIENT
Start: 2024-04-25 | End: 2024-04-25

## 2024-04-25 RX ORDER — HYDROCODONE BITARTRATE AND ACETAMINOPHEN 5; 325 MG/1; MG/1
1 TABLET ORAL EVERY 6 HOURS PRN
Status: DISCONTINUED | OUTPATIENT
Start: 2024-04-25 | End: 2024-05-08 | Stop reason: HOSPADM

## 2024-04-25 RX ORDER — LANOLIN ALCOHOL/MO/W.PET/CERES
800 CREAM (GRAM) TOPICAL
Status: DISCONTINUED | OUTPATIENT
Start: 2024-04-25 | End: 2024-05-08 | Stop reason: HOSPADM

## 2024-04-25 RX ORDER — METFORMIN HYDROCHLORIDE 1000 MG/1
1000 TABLET ORAL 2 TIMES DAILY
COMMUNITY
Start: 2024-04-15

## 2024-04-25 RX ORDER — LEVETIRACETAM 5 MG/ML
500 INJECTION INTRAVASCULAR EVERY 12 HOURS
Status: DISCONTINUED | OUTPATIENT
Start: 2024-04-25 | End: 2024-05-08 | Stop reason: HOSPADM

## 2024-04-25 RX ORDER — ONDANSETRON HYDROCHLORIDE 2 MG/ML
4 INJECTION, SOLUTION INTRAVENOUS EVERY 6 HOURS PRN
Status: DISCONTINUED | OUTPATIENT
Start: 2024-04-25 | End: 2024-05-08 | Stop reason: HOSPADM

## 2024-04-25 RX ORDER — ACETAMINOPHEN 10 MG/ML
1000 INJECTION, SOLUTION INTRAVENOUS ONCE
Status: COMPLETED | OUTPATIENT
Start: 2024-04-25 | End: 2024-04-25

## 2024-04-25 RX ORDER — ACETAMINOPHEN 325 MG/1
650 TABLET ORAL EVERY 8 HOURS PRN
Status: DISCONTINUED | OUTPATIENT
Start: 2024-04-25 | End: 2024-04-25

## 2024-04-25 RX ORDER — GLUCAGON 1 MG
1 KIT INJECTION
Status: DISCONTINUED | OUTPATIENT
Start: 2024-04-25 | End: 2024-05-08 | Stop reason: HOSPADM

## 2024-04-25 RX ORDER — SODIUM CHLORIDE 9 MG/ML
INJECTION, SOLUTION INTRAVENOUS CONTINUOUS
Status: DISCONTINUED | OUTPATIENT
Start: 2024-04-25 | End: 2024-04-25

## 2024-04-25 RX ORDER — VANCOMYCIN HCL IN 5 % DEXTROSE 1G/250ML
1000 PLASTIC BAG, INJECTION (ML) INTRAVENOUS ONCE
Status: COMPLETED | OUTPATIENT
Start: 2024-04-25 | End: 2024-04-25

## 2024-04-25 RX ORDER — HYDROCODONE BITARTRATE AND ACETAMINOPHEN 500; 5 MG/1; MG/1
TABLET ORAL
Status: DISCONTINUED | OUTPATIENT
Start: 2024-04-25 | End: 2024-05-08 | Stop reason: HOSPADM

## 2024-04-25 RX ORDER — GLUCAGON 1 MG
1 KIT INJECTION
Status: DISCONTINUED | OUTPATIENT
Start: 2024-04-25 | End: 2024-04-25

## 2024-04-25 RX ORDER — CALCIUM GLUCONATE 20 MG/ML
1 INJECTION, SOLUTION INTRAVENOUS EVERY 10 MIN PRN
Status: DISCONTINUED | OUTPATIENT
Start: 2024-04-25 | End: 2024-05-08 | Stop reason: HOSPADM

## 2024-04-25 RX ORDER — IBUPROFEN 200 MG
16 TABLET ORAL
Status: DISCONTINUED | OUTPATIENT
Start: 2024-04-25 | End: 2024-04-25

## 2024-04-25 RX ORDER — IBUPROFEN 200 MG
24 TABLET ORAL
Status: DISCONTINUED | OUTPATIENT
Start: 2024-04-25 | End: 2024-04-25

## 2024-04-25 RX ORDER — CLONIDINE HYDROCHLORIDE 0.1 MG/1
0.1 TABLET ORAL EVERY 8 HOURS PRN
Status: DISCONTINUED | OUTPATIENT
Start: 2024-04-25 | End: 2024-05-08 | Stop reason: HOSPADM

## 2024-04-25 RX ORDER — IBUPROFEN 200 MG
24 TABLET ORAL
Status: DISCONTINUED | OUTPATIENT
Start: 2024-04-25 | End: 2024-05-08 | Stop reason: HOSPADM

## 2024-04-25 RX ORDER — INSULIN ASPART 100 [IU]/ML
0-5 INJECTION, SOLUTION INTRAVENOUS; SUBCUTANEOUS EVERY 6 HOURS
Status: DISCONTINUED | OUTPATIENT
Start: 2024-04-26 | End: 2024-04-25

## 2024-04-25 RX ORDER — SODIUM CHLORIDE 0.9 % (FLUSH) 0.9 %
10 SYRINGE (ML) INJECTION EVERY 12 HOURS PRN
Status: DISCONTINUED | OUTPATIENT
Start: 2024-04-25 | End: 2024-05-08 | Stop reason: HOSPADM

## 2024-04-25 RX ORDER — PANTOPRAZOLE SODIUM 40 MG/10ML
80 INJECTION, POWDER, LYOPHILIZED, FOR SOLUTION INTRAVENOUS
Status: COMPLETED | OUTPATIENT
Start: 2024-04-25 | End: 2024-04-25

## 2024-04-25 RX ORDER — CALCIUM GLUCONATE 20 MG/ML
1 INJECTION, SOLUTION INTRAVENOUS
Status: COMPLETED | OUTPATIENT
Start: 2024-04-25 | End: 2024-04-25

## 2024-04-25 RX ORDER — NALOXONE HCL 0.4 MG/ML
0.02 VIAL (ML) INJECTION
Status: DISCONTINUED | OUTPATIENT
Start: 2024-04-25 | End: 2024-05-08 | Stop reason: HOSPADM

## 2024-04-25 RX ORDER — ACETAMINOPHEN 650 MG/1
650 SUPPOSITORY RECTAL
Status: COMPLETED | OUTPATIENT
Start: 2024-04-25 | End: 2024-04-25

## 2024-04-25 RX ORDER — TALC
6 POWDER (GRAM) TOPICAL NIGHTLY PRN
Status: DISCONTINUED | OUTPATIENT
Start: 2024-04-25 | End: 2024-05-08 | Stop reason: HOSPADM

## 2024-04-25 RX ORDER — IBUPROFEN 200 MG
16 TABLET ORAL
Status: DISCONTINUED | OUTPATIENT
Start: 2024-04-25 | End: 2024-05-08 | Stop reason: HOSPADM

## 2024-04-25 RX ORDER — SODIUM CHLORIDE 9 MG/ML
1000 INJECTION, SOLUTION INTRAVENOUS
Status: COMPLETED | OUTPATIENT
Start: 2024-04-25 | End: 2024-04-25

## 2024-04-25 RX ORDER — INSULIN ASPART 100 [IU]/ML
0-5 INJECTION, SOLUTION INTRAVENOUS; SUBCUTANEOUS EVERY 4 HOURS
Status: DISCONTINUED | OUTPATIENT
Start: 2024-04-26 | End: 2024-04-30

## 2024-04-25 RX ORDER — ALUMINUM HYDROXIDE, MAGNESIUM HYDROXIDE, AND SIMETHICONE 1200; 120; 1200 MG/30ML; MG/30ML; MG/30ML
30 SUSPENSION ORAL 4 TIMES DAILY PRN
Status: DISCONTINUED | OUTPATIENT
Start: 2024-04-25 | End: 2024-05-08 | Stop reason: HOSPADM

## 2024-04-25 RX ORDER — MUPIROCIN 20 MG/G
OINTMENT TOPICAL 2 TIMES DAILY
Status: DISPENSED | OUTPATIENT
Start: 2024-04-25 | End: 2024-04-30

## 2024-04-25 RX ORDER — CALCIUM CHLORIDE INJECTION 100 MG/ML
1 INJECTION, SOLUTION INTRAVENOUS
Status: DISCONTINUED | OUTPATIENT
Start: 2024-04-25 | End: 2024-04-25

## 2024-04-25 RX ORDER — METRONIDAZOLE 500 MG/100ML
500 INJECTION, SOLUTION INTRAVENOUS
Status: COMPLETED | OUTPATIENT
Start: 2024-04-25 | End: 2024-04-25

## 2024-04-25 RX ORDER — ALBUTEROL SULFATE 0.83 MG/ML
10 SOLUTION RESPIRATORY (INHALATION)
Status: COMPLETED | OUTPATIENT
Start: 2024-04-25 | End: 2024-04-25

## 2024-04-25 RX ORDER — ACETAMINOPHEN 325 MG/1
650 TABLET ORAL EVERY 4 HOURS PRN
Status: DISCONTINUED | OUTPATIENT
Start: 2024-04-25 | End: 2024-05-08 | Stop reason: HOSPADM

## 2024-04-25 RX ADMIN — ONDANSETRON 4 MG: 2 INJECTION INTRAMUSCULAR; INTRAVENOUS at 04:04

## 2024-04-25 RX ADMIN — PANTOPRAZOLE SODIUM 8 MG/HR: 40 INJECTION, POWDER, FOR SOLUTION INTRAVENOUS at 03:04

## 2024-04-25 RX ADMIN — VANCOMYCIN HYDROCHLORIDE 1000 MG: 1 INJECTION, POWDER, LYOPHILIZED, FOR SOLUTION INTRAVENOUS at 02:04

## 2024-04-25 RX ADMIN — SODIUM POLYSTYRENE SULFONATE 30 G: 15 SUSPENSION ORAL; RECTAL at 05:04

## 2024-04-25 RX ADMIN — CALCIUM GLUCONATE 1 G: 20 INJECTION, SOLUTION INTRAVENOUS at 05:04

## 2024-04-25 RX ADMIN — PANTOPRAZOLE SODIUM 80 MG: 40 INJECTION, POWDER, FOR SOLUTION INTRAVENOUS at 02:04

## 2024-04-25 RX ADMIN — LEVETIRACETAM 500 MG: 5 INJECTION INTRAVENOUS at 10:04

## 2024-04-25 RX ADMIN — MUPIROCIN 1 G: 20 OINTMENT TOPICAL at 10:04

## 2024-04-25 RX ADMIN — VANCOMYCIN HYDROCHLORIDE 500 MG: KIT at 05:04

## 2024-04-25 RX ADMIN — PANTOPRAZOLE SODIUM 8 MG/HR: 40 INJECTION, POWDER, FOR SOLUTION INTRAVENOUS at 11:04

## 2024-04-25 RX ADMIN — SODIUM CHLORIDE: 9 INJECTION, SOLUTION INTRAVENOUS at 07:04

## 2024-04-25 RX ADMIN — PROMETHAZINE HYDROCHLORIDE 25 MG: 25 INJECTION INTRAMUSCULAR; INTRAVENOUS at 05:04

## 2024-04-25 RX ADMIN — SODIUM CHLORIDE 1000 ML: 9 INJECTION, SOLUTION INTRAVENOUS at 02:04

## 2024-04-25 RX ADMIN — DEXTROSE MONOHYDRATE 50 G: 25 INJECTION, SOLUTION INTRAVENOUS at 05:04

## 2024-04-25 RX ADMIN — METRONIDAZOLE 500 MG: 5 INJECTION, SOLUTION INTRAVENOUS at 06:04

## 2024-04-25 RX ADMIN — INSULIN DETEMIR 5 UNITS: 100 INJECTION, SOLUTION SUBCUTANEOUS at 08:04

## 2024-04-25 RX ADMIN — ACETAMINOPHEN 1000 MG: 10 INJECTION INTRAVENOUS at 02:04

## 2024-04-25 RX ADMIN — ACETAMINOPHEN 650 MG: 650 SUPPOSITORY RECTAL at 12:04

## 2024-04-25 RX ADMIN — PIPERACILLIN SODIUM AND TAZOBACTAM SODIUM 4.5 G: 4; .5 INJECTION, POWDER, LYOPHILIZED, FOR SOLUTION INTRAVENOUS at 12:04

## 2024-04-25 RX ADMIN — ALBUTEROL SULFATE 10 MG: 2.5 SOLUTION RESPIRATORY (INHALATION) at 06:04

## 2024-04-25 RX ADMIN — SODIUM CHLORIDE 1000 ML: 9 INJECTION, SOLUTION INTRAVENOUS at 05:04

## 2024-04-25 RX ADMIN — MEROPENEM 500 MG: 500 INJECTION, POWDER, FOR SOLUTION INTRAVENOUS at 09:04

## 2024-04-25 RX ADMIN — INSULIN HUMAN 5.35 UNITS: 100 INJECTION, SOLUTION PARENTERAL at 05:04

## 2024-04-25 NOTE — H&P
Formerly [facility] - Emergency Dept  Hospital Medicine  History & Physical    Patient Name: Steff Johnson  MRN: [id number]  Patient Class: Emergency  Admission Date: 4/25/2024  Attending Physician: Celine Buck MD  Primary Care Provider: Cristina Ren MD         Patient information was obtained from past medical records and ER records.     Subjective:     Principal Problem:GI hemorrhage    Chief Complaint:   Chief Complaint   Patient presents with    GI Problem     Brown fluid being withdrawn from peg tube, vomiting brown fluid. Smells of feces. Since this morning. Pt is nonverbal        HPI: 63 yo female with PMH of CVA with known baseline confusion and aphasia who was sent to the ER because of black drainage coming from around the PEG tube site and having black vomit.     On presentation to the ER, vitals showed 101/59, HR of 107, RR of 25 and fever of 102.3, spo2 of 95%. Cbc with wbc of 28 and anemia of 8.8. CMP showed acute renal failure with creatinine of of 2.5, anion gap of 19, hyponatermia of 133, potassium of 5.9. lactic acid of 6.1. Pt started to have projectile hematemesis. NGT was placed. Mathur was replaced after which pt developed hematuria. UA was grossly positive. C.diffi antigen is positive. EKG showed sinus tachycardia. CT C/A/P showed scattered nonspecific bronchiolitis in both lungs, with right lower lobes opacities potentially reflecting chronic bronchopneumonia, given similar appearance to CT of 12/11/2023. Pt was given the hyperkalemia procotol, 1.6 L of fluids, vanco/zosyn, and protonix 80 mg iv. She will be admitted to medicine for further management.     Past Medical History:   Diagnosis Date    Arthritis     Complete tear of left rotator cuff 11/09/2017    COPD (chronic obstructive pulmonary disease)     COVID-19 infection 07/02/2021    Diabetes mellitus     H/o Cerebrovascular accident (CVA) of left pontine structure 12/12/2019    Hypertension     Personal history of colonic  polyps     Stroke     Wears glasses        Past Surgical History:   Procedure Laterality Date    COLONOSCOPY N/A 4/11/2017    Procedure: COLONOSCOPY;  Surgeon: Jared Antonio MD;  Location: [facility] ENDO;  Service: Endoscopy;  Laterality: N/A;    CYSTOSCOPY W/ RETROGRADES N/A 12/14/2023    Procedure: CYSTOSCOPY, WITH RETROGRADE PYELOGRAM;  Surgeon: Sergio Soria MD;  Location: [facility] OR;  Service: Urology;  Laterality: N/A;    ECHOCARDIOGRAM,TRANSESOPHAGEAL N/A 12/12/2023    Procedure: Transesophageal echo (GT) intra-procedure log documentation;  Surgeon: Antoine Rivera MD;  Location: [facility] CATH/EP LAB;  Service: Cardiology;  Laterality: N/A;    ESOPHAGOGASTRODUODENOSCOPY N/A 2/25/2024    Procedure: EGD (ESOPHAGOGASTRODUODENOSCOPY);  Surgeon: Alexandru May MD;  Location: [facility] ENDO;  Service: Endoscopy;  Laterality: N/A;    HYSTERECTOMY      INSERTION OF CATHETER N/A 12/14/2023    Procedure: INSERTION, CATHETER;  Surgeon: Sergio Soria MD;  Location: [facility] OR;  Service: Urology;  Laterality: N/A;    OOPHORECTOMY      SHOULDER SURGERY      R    TRANSESOPHAGEAL ECHOCARDIOGRAM WITH POSSIBLE CARDIOVERSION (GT W/ POSS CARDIOVERSION) N/A 5/4/2023    Procedure: Transesophageal echo (GT) intra-procedure log documentation;  Surgeon: Blade Phillip MD;  Location: [facility] CATH/EP LAB;  Service: Cardiology;  Laterality: N/A;       Review of patient's allergies indicates:  No Known Allergies    Current Facility-Administered Medications   Medication Dose Route Frequency Provider Last Rate Last Admin    0.9%  NaCl infusion  1,000 mL Intravenous ED 1 Time Fouzia Mcmahon NP        albuterol nebulizer solution 10 mg  10 mg Nebulization ED 1 Time Fouzia Mcmahon NP        calcium gluconate 1 g in NS IVPB (premixed)  1 g Intravenous Q10 Min PRN Fouzia Mcmahon NP        dextrose 50% injection 25 g  25 g Intravenous PRN Lauri Causey DO        metronidazole IVPB 500 mg  500 mg Intravenous ED 1 Time Marium  DO Lauri        pantoprazole 40 mg in  mL infusion (ready to mix)  8 mg/hr Intravenous Continuous Lauri Causey DO 20 mL/hr at 04/25/24 1558 8 mg/hr at 04/25/24 1558    piperacillin-tazobactam 4.5 g in dextrose 5 % 100 mL IVPB (ready to mix)  4.5 g Intravenous Q8H Fouzia Mcmahon, NP   Stopped at 04/25/24 1345    promethazine (PHENERGAN) 25 mg in dextrose 5 % (D5W) 50 mL IVPB  25 mg Intravenous ED 1 Time Fouzia Mcmahon,  mL/hr at 04/25/24 1748 25 mg at 04/25/24 1748     Current Outpatient Medications   Medication Sig Dispense Refill    allopurinoL (ZYLOPRIM) 100 MG tablet 100 mg by Per G Tube route once daily.      amLODIPine (NORVASC) 10 MG tablet 1 tablet (10 mg total) by Per G Tube route once daily.      apixaban (ELIQUIS) 2.5 mg Tab 2.5 mg by Per G Tube route 2 (two) times daily.      ascorbic acid, vitamin C, (VITAMIN C) 500 MG tablet 500 mg by Per G Tube route 2 (two) times daily.      atorvastatin (LIPITOR) 80 MG tablet 1 tablet (80 mg total) by Per G Tube route every evening. 30 tablet 0    folic acid (FOLVITE) 1 MG tablet 1 tablet by Per G Tube route every morning.      furosemide (LASIX) 40 MG tablet 40 mg by Per G Tube route once daily.      HUMALOG KWIKPEN INSULIN 100 unit/mL pen Inject 0-20 Units into the skin As instructed (inject 4 times daily per sliding scale). 0 -149 = 0 units  150 - 199 = 4 units  200 - 249 = 8 units  250 - 299 = 12 units  300 - 349 = 16 units  350 - 1000 = 20 units Call MD      insulin (LANTUS SOLOSTAR U-100 INSULIN) glargine 100 units/mL SubQ pen Inject 35 Units into the skin once daily.      insulin lispro 100 unit/mL injection Inject 2 Units into the skin 3 (three) times daily before meals. 07:30  11:30  16:30      latanoprost 0.005 % ophthalmic solution Place 1 drop into both eyes once daily. (Patient taking differently: Place 1 drop into both eyes every evening.) 7.5 mL 6    levETIRAcetam (KEPPRA) 100 mg/mL Soln 500 mg by Per G Tube route 2 (two) times  daily.      losartan (COZAAR) 100 MG tablet 1 tablet (100 mg total) by Per G Tube route once daily.      magnesium oxide (MAG-OX) 400 mg (241.3 mg magnesium) tablet 400 mg by Per G Tube route once daily.      metFORMIN (GLUCOPHAGE) 1000 MG tablet Take 1,000 mg by mouth 2 (two) times daily.      metoclopramide HCl (REGLAN) 5 mg/5 mL Soln 10 mg by Per G Tube route 3 (three) times daily.      metoprolol tartrate (LOPRESSOR) 25 MG tablet 25 mg by Per G Tube route 2 (two) times daily.      multivitamin (THERAGRAN) per tablet 1 tablet by Per G Tube route once daily.      omeprazole (PRILOSEC) 20 MG capsule Take 20 mg by mouth once daily. Per G-Tube      polyethylene glycol (GLYCOLAX) 17 gram PwPk 17 g by Per G Tube route once daily.      thiamine (VITAMIN B-1) 100 MG tablet 100 mg by Per G Tube route once daily.      zinc sulfate (ZINC-220 ORAL) 1 tablet by PEG Tube route Daily.      aspirin 81 MG Chew 1 tablet (81 mg total) by Per G Tube route once daily.      baclofen (LIORESAL) 5 mg Tab tablet 5 mg by Per G Tube route every 8 (eight) hours as needed (muscle spasms).      cloNIDine (CATAPRES) 0.1 MG tablet 1 tablet (0.1 mg total) by Per G Tube route 3 (three) times daily. 90 tablet 11    tiotropium (SPIRIVA) 18 mcg inhalation capsule Inhale 1 capsule (18 mcg total) into the lungs once daily. Controller 30 capsule 0     Family History       Problem Relation (Age of Onset)    Anemia Daughter    Asthma Mother    Diabetes Mother, Father, Brother    Hypertension Mother, Brother          Tobacco Use    Smoking status: Never    Smokeless tobacco: Never   Substance and Sexual Activity    Alcohol use: No    Drug use: No    Sexual activity: Not Currently     Review of Systems   Unable to perform ROS: Mental status change (due to pt being unresponsive)     Objective:     Vital Signs (Most Recent):  Temp: (!) 102.3 °F (39.1 °C) (04/25/24 1246)  Pulse: 104 (04/25/24 1429)  Resp: (!) 38 (04/25/24 1429)  BP: 121/72 (04/25/24  1429)  SpO2: 98 % (04/25/24 1429) Vital Signs (24h Range):  Temp:  [102.3 °F (39.1 °C)] 102.3 °F (39.1 °C)  Pulse:  [104-107] 104  Resp:  [25-38] 38  SpO2:  [95 %-98 %] 98 %  BP: (101-121)/(59-72) 121/72     Weight: 53.5 kg (118 lb)  Body mass index is 23.83 kg/m².     Physical Exam  Vitals reviewed.   Constitutional:       Comments: Unresponsive at the time of my interview.    HENT:      Mouth/Throat:      Mouth: Mucous membranes are dry.   Eyes:      Pupils: Pupils are equal, round, and reactive to light.   Cardiovascular:      Rate and Rhythm: Regular rhythm. Tachycardia present.   Pulmonary:      Effort: Pulmonary effort is normal. No respiratory distress.      Breath sounds: Normal breath sounds.   Abdominal:      General: Abdomen is flat. Bowel sounds are normal.      Palpations: Abdomen is soft.   Musculoskeletal:      Cervical back: Neck supple.      Comments: Contracted lower extremities.    Skin:     General: Skin is warm.   Neurological:      Comments: Pt is unresponsive, neuro exam couldn't be assessed.    Psychiatric:      Comments: Pt is unresponsive, psych exam couldn't be assessed.               CRANIAL NERVES     CN III, IV, VI   Pupils are equal, round, and reactive to light.       Significant Labs: All pertinent labs within the past 24 hours have been reviewed.  Recent Lab Results  (Last 5 results in the past 24 hours)        04/25/24  1716   04/25/24  1701   04/25/24  1548   04/25/24  1526   04/25/24  1509        Baso #   0.05             Basophil %   0.2             C difficile Toxins A+B, EIA       Negative  Comment: Testing not recommended for children <24 months old.         C. diff Antigen       Positive         Differential Method   Automated             Eos #   0.2             Eos %   0.6             Gran # (ANC)   20.7             Gran %   83.0             Hematocrit   22.3             Hemoglobin   7.3             Immature Grans (Abs)   0.28  Comment: Mild elevation in immature  granulocytes is non specific and   can be seen in a variety of conditions including stress response,   acute inflammation, trauma and pregnancy. Correlation with other   laboratory and clinical findings is essential.               Immature Granulocytes   1.1             Lactic Acid Level     4.9  Comment: Falsely low lactic acid results can be found in samples   containing >=13.0 mg/dL total bilirubin and/or >=3.5 mg/dL   direct bilirubin.  Critical result LAC 4.9 mmol/L called to and read back by Shw[facility], RN. at 25-Apr-2024 16:44 by [facility] Center[facility].             Lymph #   2.3             Lymph %   9.3             MCH   26.4             MCHC   32.7             MCV   81             Mono #   1.5             Mono %   5.8             MPV   10.7             nRBC   0             Occult Blood         Positive       Platelet Count   560             POC Glucose 214               RBC   2.76             RDW   18.7             Stool WBC       No neutrophils seen         WBC   24.95                                    Significant Imaging: I have reviewed all pertinent imaging results/findings within the past 24 hours.  Assessment/Plan:     * GI hemorrhage  Drop in hgb from 8.8 to 7.3 on repeat labs in the ER.  Will continue to monitor hgb Q6h, transfuse if hgb <7.  Protonix 80 mg iv given. Continue protonix iv, will order 40 mg bid iv.   Hold aspirin and eliquis.       Severe sepsis  Suspect 2/2 UTI and c.diffi.   Del Rosario was replaced. UA from new del rosario is grossly positive. Urine and blood cx collected.   - CT C/A/P showed scattered nonspecific bronchiolitis in both lungs, with right lower lobes opacities potentially reflecting chronic bronchopneumonia, given similar appearance to CT of 12/11/2023.   - Continue vanco. Change zosyn to meropenem given h/o MDRO.   Start vanco oral for the c.diffi.  ID consult.   1.6 L of bolus given bolus.  Continue maintence fluid with 0.9 NS at 100 cc/hr.   Lactic acid is improving  from 6.1 to 4.6, but remains elevated. Continue to trend.    Acute blood loss anemia  2/2 gi bleed.  Monitor hgb q6h and transfuse if hgb <7.    Current CBC reviewed-   Lab Results   Component Value Date    HGB 7.3 (L) 04/25/2024    HCT 22.3 (L) 04/25/2024     Monitor serial CBC and transfuse if patient becomes hemodynamically unstable, symptomatic or H/H drops below 7/21.    Hyperkalemia  Repeat labs stat now that pt received her hypekalemia regimen.  Will correct if needed if K+ remained elevated.     Recent Labs   Lab 04/25/24  1223   K 5.9*             CVA (cerebral vascular accident)  Hold aspirin and eliquis  Resume statin.     Seizure  Resume keppra      Acute renal failure  Baseline creatinine is 0.7, but today is 2.5.   Likely 2/2 gi bleed and severe sepsis.   Urine electrolytes and iv fluids.   Repeat labs in am.    Type 2 diabetes mellitus with neurologic complication, with long-term current use of insulin  Pt is on long acting 35 units qhs and humalog with meals.   Will order lantus 5 units qhs and humalog sliding scale.      Hypertension  Chronic.    Temp:  [102.3 °F (39.1 °C)]   Pulse:  []   Resp:  [25-38]   BP: (101-121)/(59-72)   SpO2:  [95 %-98 %] .   Home meds for hypertension were reviewed and noted below.   Hypertension Medications               amLODIPine (NORVASC) 10 MG tablet 1 tablet (10 mg total) by Per G Tube route once daily.    furosemide (LASIX) 40 MG tablet 40 mg by Per G Tube route once daily.    losartan (COZAAR) 100 MG tablet 1 tablet (100 mg total) by Per G Tube route once daily.    metoprolol tartrate (LOPRESSOR) 25 MG tablet 25 mg by Per G Tube route 2 (two) times daily.    cloNIDine (CATAPRES) 0.1 MG tablet 1 tablet (0.1 mg total) by Per G Tube route 3 (three) times daily.          Will order clonidine 0.1 q8h prn only in case pt went into rebound HTN. Pt is currently severely sepsis, has risk of hypotension and decompensation.     Hematuria  Traumatic del rosario placement per  report.   Monitor hgb. Transfuse if <7.      Clostridium difficile infection  Vanco oral.      Complicated UTI (urinary tract infection)  See severe sepsis for further plan of care.       VTE Risk Mitigation (From admission, onward)      None             I have spent more than 70 min in  [facility] H&P today.            Celine Buck MD  Department of Hospital Medicine  Formerly [facility] - Emergency Dept

## 2024-04-25 NOTE — ASSESSMENT & PLAN NOTE
Repeat labs stat now that pt received her hypekalemia regimen.  Will correct if needed if K+ remained elevated.     Recent Labs   Lab 04/25/24  1223   K 5.9*

## 2024-04-25 NOTE — ASSESSMENT & PLAN NOTE
Patient's anemia is currently controlled. Has received 2 units of PRBCs on 4/24 . Etiology likely d/t acute blood loss which was from GIB  Current CBC reviewed-   Lab Results   Component Value Date    HGB 9.2 (L) 04/30/2024    HCT 28.6 (L) 04/30/2024     Monitor serial CBC and transfuse if patient becomes hemodynamically unstable, symptomatic or H/H drops below 7/21.

## 2024-04-25 NOTE — ED PROVIDER NOTES
Source of History:  EMS, nursing home staff, chart    Chief complaint:  GI Problem (Brown fluid being withdrawn from peg tube, vomiting brown fluid. Smells of feces. Since this morning. Pt is nonverbal)      HPI:  Steff Johnson is a 64 y.o. female with medical history of CVA, aphasia, arthritis, COPD, diabetes, hypertension, right-sided weakness, peg tube presenting with vomiting brown fluid.  Patient also has dark brown fluid from PEG tube and dark brown diarrhea.  As per nursing home staff symptoms began this morning.    This is the extent to the patients complaints today here in the emergency department.    ROS: As per HPI and below: (Unable to obtain due to patient being nonverbal)    Review of patient's allergies indicates:  No Known Allergies    PMH:  As per HPI and below:  Past Medical History:   Diagnosis Date    Arthritis     Complete tear of left rotator cuff 11/09/2017    COPD (chronic obstructive pulmonary disease)     COVID-19 infection 07/02/2021    Diabetes mellitus     H/o Cerebrovascular accident (CVA) of left pontine structure 12/12/2019    Hypertension     Personal history of colonic polyps     Stroke     Wears glasses      Past Surgical History:   Procedure Laterality Date    COLONOSCOPY N/A 4/11/2017    Procedure: COLONOSCOPY;  Surgeon: Jared Antonio MD;  Location: [facility];  Service: Endoscopy;  Laterality: N/A;    CYSTOSCOPY W/ RETROGRADES N/A 12/14/2023    Procedure: CYSTOSCOPY, WITH RETROGRADE PYELOGRAM;  Surgeon: Sergio Soria MD;  Location: [facility] OR;  Service: Urology;  Laterality: N/A;    ECHOCARDIOGRAM,TRANSESOPHAGEAL N/A 12/12/2023    Procedure: Transesophageal echo (GT) intra-procedure log documentation;  Surgeon: Antoine Rivera MD;  Location: [facility] CATH/EP LAB;  Service: Cardiology;  Laterality: N/A;    ESOPHAGOGASTRODUODENOSCOPY N/A 2/25/2024    Procedure: EGD (ESOPHAGOGASTRODUODENOSCOPY);  Surgeon: Alexandru May MD;  Location: [facility] ENDO;  Service: Endoscopy;   "Laterality: N/A;    HYSTERECTOMY      INSERTION OF CATHETER N/A 12/14/2023    Procedure: INSERTION, CATHETER;  Surgeon: Sergio Soria MD;  Location: [facility] OR;  Service: Urology;  Laterality: N/A;    OOPHORECTOMY      SHOULDER SURGERY      R    TRANSESOPHAGEAL ECHOCARDIOGRAM WITH POSSIBLE CARDIOVERSION (GT W/ POSS CARDIOVERSION) N/A 5/4/2023    Procedure: Transesophageal echo (GT) intra-procedure log documentation;  Surgeon: Blade Phillip MD;  Location: [facility] CATH/EP LAB;  Service: Cardiology;  Laterality: N/A;       Social History     Tobacco Use    Smoking status: Never    Smokeless tobacco: Never   Substance Use Topics    Alcohol use: No    Drug use: No       Physical Exam:    /72   Pulse 104   Temp (!) 102.3 °F (39.1 °C)   Resp (!) 38   Ht 4' 11" (1.499 m)   Wt 53.5 kg (118 lb)   SpO2 98%   BMI 23.83 kg/m²     Nursing note and vital signs reviewed.  Hypotensive, febrile, hypoxic and tachycardic on initial exam.  Constitutional:  Chronically ill-appearing  Eyes: No conjunctival injection.Extraocular muscles are intact.  ENT: Oropharynx clear.  Normal phonation.  Mucous membranes pale and dry.  Cardiovascular: Rapid, regular rate and rhythm.  No murmurs. No gallops. No rubs  Respiratory: diminished to auscultation bilaterally.  Decreased air movement.  No wheezes.  No rhonchi. No rales. No accessory muscle use.  Abdomen: Protuberant but soft.  PEG tube in place with dark brown output.  Pt actively vomiting dark brown liquid.  Dark brown liquid stool noted on exam.  Bowel sounds hyperactive.   Musculoskeletal: All extremity muscle contractions noted.    Skin: No rashes seen.  Poor skin turgor.  No abrasions.  Scattered ecchymoses.  Neurologic: Aphasic at baseline.    Psych: Appropriate, conversant    MDM    Emergent evaluation of a nonverbal 65 yo female presenting for brown liquid around PEG tube.  As per nursing home staff symptoms began this morning.  On exam pt is aphasic.  As per nursing " home staff patient is at baseline.  Hypotensive, febrile, hypoxic and tachycardic on initial exam.  Patient is chronically ill-appearing. Mucous membranes pale and dry.  Breath sounds diminished bilaterally with no rhonchi, rales or wheezing noted.  Decreased air movement in all lobes noted.  Abdomen is protuberant but soft.  Peg tube noted with dark brown liquid surrounding tube insertion site as well as patient is vomiting dark brown liquid.  Dark brown stool noted on exam.  Bowel sounds hyperactive.  Poor skin turgor noted.    History Acquisition   Additional history was acquired from other historians.  EMS, chart, nursing home staff    The patient's list of active medical problems, social history, medications, and allergies as documented per RN staff has been reviewed.     Differential Diagnoses   Based on available information and the initial assessment, the working differential diagnoses considered during this evaluation include but are not limited to sepsis, dehydration, electrolyte abnormality, GI bleed, gastritis, gastroenteritis, C diff, infectious diarrhea, pneumonia, bronchitis, bowel obstruction, others.    I will get sepsis labs, imaging, hydrate, medicate and reassess.  I discussed this case with my supervising physician.      LABS     Labs Reviewed   CLOSTRIDIUM DIFFICILE - Abnormal; Notable for the following components:       Result Value    C. diff Antigen Positive (*)     All other components within normal limits   CBC W/ AUTO DIFFERENTIAL - Abnormal; Notable for the following components:    WBC 28.38 (*)     RBC 3.41 (*)     Hemoglobin 8.8 (*)     Hematocrit 27.0 (*)     MCV 79 (*)     MCH 25.8 (*)     RDW 18.8 (*)     Platelets 818 (*)     Lymph % 8.0 (*)     Platelet Estimate Increased (*)     All other components within normal limits   COMPREHENSIVE METABOLIC PANEL - Abnormal; Notable for the following components:    Sodium 133 (*)     Potassium 5.9 (*)     CO2 18 (*)     Glucose 210 (*)      BUN 93 (*)     Creatinine 2.5 (*)     Albumin 3.3 (*)     eGFR 20.7 (*)     Anion Gap 19 (*)     All other components within normal limits   URINALYSIS, REFLEX TO URINE CULTURE - Abnormal; Notable for the following components:    Color, UA Red (*)     Appearance, UA Cloudy (*)     pH, UA >8.0 (*)     Protein, UA 3+ (*)     Occult Blood UA 3+ (*)     Leukocytes, UA 3+ (*)     All other components within normal limits    Narrative:     Specimen Source->Urine   LACTIC ACID, PLASMA - Abnormal; Notable for the following components:    Lactate (Lactic Acid) 6.1 (*)     All other components within normal limits   LACTIC ACID, PLASMA - Abnormal; Notable for the following components:    Lactate (Lactic Acid) 4.9 (*)     All other components within normal limits   URINALYSIS MICROSCOPIC - Abnormal; Notable for the following components:    RBC, UA >100 (*)     WBC, UA >100 (*)     WBC Clumps, UA Many (*)     Bacteria Many (*)     All other components within normal limits    Narrative:     Specimen Source->Urine   OCCULT BLOOD X 1, STOOL - Abnormal; Notable for the following components:    Occult Blood Positive (*)     All other components within normal limits   POCT GLUCOSE - Abnormal; Notable for the following components:    POC Glucose 214 (*)     All other components within normal limits   CULTURE, BLOOD   CULTURE, BLOOD   CULTURE, URINE   CULTURE, STOOL   C DIFF TOXIN BY PCR   LIPASE   PHOSPHORUS   MAGNESIUM   WBC, STOOL   PROCALCITONIN   STOOL EXAM-OVA,CYSTS,PARASITES   CBC W/ AUTO DIFFERENTIAL   POCT GLUCOSE MONITORING CONTINUOUS   POCT GLUCOSE MONITORING CONTINUOUS         Imaging     Imaging Results              CT Chest Abdomen Pelvis Without Contrast (XPD) (Final result)  Result time 04/25/24 16:10:59   Procedure changed from CT Abdomen Pelvis  Without Contrast     Final result by Burke Bermudez MD (04/25/24 16:10:59)                   Impression:      1. Scattered nonspecific bronchiolitis in both lungs, with  right lower lobes opacities potentially reflecting chronic bronchopneumonia, given similar appearance to CT of 12/11/2023.  2. Additional stable observations as described.      Electronically signed by: Burke Bermudez  Date:    04/25/2024  Time:    16:10               Narrative:    EXAMINATION:  CT CHEST ABDOMEN PELVIS WITHOUT CONTRAST(XPD)    CLINICAL HISTORY:  Nausea/vomiting;    TECHNIQUE:  Axial imaging through the chest, abdomen and pelvis was performed without IV contrast.  Lack of IV contrast limits evaluation of the mediastinum, vasculature, solid organs and hollow viscera.    COMPARISON:  Multiple prior exams.    FINDINGS:  CT CHEST: A nasogastric tube is present.  The unenhanced heart and great vessels are unremarkable, with no mediastinal or hilar lymph node enlargement.  No pericardial effusion.    There are scattered right lung centrilobular nodular opacities and ground-glass densities, with patchy airspace opacities in the right lower lobe, and minimal ground-glass and centrilobular nodular opacities at the left lung base.  No pleural effusion, evidence of pulmonary edema, or pneumothorax.  The central airways are patent.    The unenhanced chest wall soft tissues are unremarkable.  There are no acute fractures or destructive osseous lesions.    CT ABDOMEN: The unenhanced liver is unremarkable, with nonspecific hypodensities in the gallbladder fossa similar to prior CT.  Mass involving the lateral spleen is grossly unchanged, with the unenhanced pancreas, adrenal glands and kidneys unremarkable.  No renal calculi or hydronephrosis.  The abdominal aorta is normal in caliber.    Distal portion of nasogastric tube lies in the stomach, with distal tip in the pyloric region.  Percutaneous gastrostomy tube distal tip and retention balloon lie in the gastric body.  There is no bowel obstruction, ascites, or intraperitoneal free air.  The appendix is normal.    CT PELVIS: There is a rectal tube and retention  balloon, with Mathur catheter and retention balloon in the urinary bladder.  The unenhanced sigmoid colon and rectum are unremarkable, with several calcified pelvic phleboliths.  No distal ureteral or bladder calculi, or pelvic free fluid.    There are prominent calcifications about the right and left anterior iliac spines, as well as about the right hip joint, with no acute fractures or destructive osseous lesions.                                       X-Ray Chest AP Portable (Final result)  Result time 04/25/24 15:04:39      Final result by Burke Bermudez MD (04/25/24 15:04:39)                   Impression:      Interval insertion of a nasogastric tube as described.      Electronically signed by: Burke Bermudez  Date:    04/25/2024  Time:    15:04               Narrative:    EXAMINATION:  XR CHEST AP PORTABLE    CLINICAL HISTORY:  Encounter for other specified special examinations    FINDINGS:  Two portable chest radiographs at 14:41 hours compared to 12:58 hours show interval insertion of a nasogastric tube, with distal portion overlying the gastric body.  No other significant interval change.                                       X-Ray Chest AP Portable (Final result)  Result time 04/25/24 13:23:25      Final result by Serg Brambila MD (04/25/24 13:23:25)                   Impression:      Mild faint nonspecific infrahilar interstitial opacity, new from the previous exam suggesting very mild aspiration/bronchitis/pneumonia or trace edema in the appropriate clinical setting.      Electronically signed by: Serg Brambila  Date:    04/25/2024  Time:    13:23               Narrative:    CLINICAL HISTORY:  ([id number])63 y/o  (1960) F    Shortness of breath    TECHNIQUE:  (A#[id number], exam time 4/25/2024 13:21)    XR CHEST AP PORTABLE [id number]    COMPARISON:  Radiograph from 02/27/2024.    FINDINGS:  There are faint linear opacities at the lung bases suggestive of atelectasis/scarring  aspiration/pneumonia  or trace edema in the appropriate clinical setting. Costophrenic angles are seen without effusion. No pneumothorax is identified. The heart is normal in size. The mediastinum is within normal limits. Osseous structures show slight dextrocurvature of the thoracic spine.  The visualized upper abdomen is unremarkable.                                      A radiology report was available for my review at the time of the encounter.    EKG   EKG Readings: (Independently Interpreted)   Initial Reading: No STEMI. Previous EKG: Compared with most recent EKG Previous EKG Date: 2/23/24. Rhythm: Sinus Tachycardia. Heart Rate: 104.   My independent interpretation    No STEMI  Sinus tachycardia  Rate of 104       Additional Consideration   All available testing was considered during the course of this workup.  Comorbidities taken into consideration during the patient's evaluation and treatment include weight, age.    Social determinants of health were taken into consideration during development of our treatment plan.    Medications   piperacillin-tazobactam 4.5 g in dextrose 5 % 100 mL IVPB (ready to mix) (0 g Intravenous Stopped 4/25/24 1345)   pantoprazole 40 mg in  mL infusion (ready to mix) (8 mg/hr Intravenous New Bag 4/25/24 1558)   metronidazole IVPB 500 mg (has no administration in time range)   albuterol nebulizer solution 10 mg (has no administration in time range)   0.9%  NaCl infusion (has no administration in time range)   calcium gluconate 1 g in NS IVPB (premixed) (has no administration in time range)     And   calcium gluconate 1 g in NS IVPB (premixed) (has no administration in time range)   dextrose 50% injection 50 g (has no administration in time range)     And   dextrose 50% injection 25 g (has no administration in time range)     And   insulin regular injection 5.35 Units 0.0535 mL (has no administration in time range)   acetaminophen suppository 650 mg (650 mg Rectal Given 4/25/24 1246)   sodium  chloride 0.9% bolus 1,605 mL 1,605 mL (0 mLs Intravenous Stopped 4/25/24 1530)   vancomycin in dextrose 5 % 1 gram/250 mL IVPB 1,000 mg (0 mg Intravenous Stopped 4/25/24 1602)   pantoprazole injection 80 mg (80 mg Intravenous Given 4/25/24 1430)   ondansetron injection 4 mg (4 mg Intravenous Given 4/25/24 1601)   acetaminophen 1,000 mg/100 mL (10 mg/mL) injection 1,000 mg (0 mg Intravenous Stopped 4/25/24 1446)   vancomycin 125 mg/5 mL oral solution 500 mg (500 mg Oral Given 4/25/24 1705)   sodium polystyrene sulfonate 15 gram/60 mL 0.25 g/mL oral liquid 30 g (30 g Oral Given 4/25/24 1721)      ED Course as of 04/25/24 1733   Thu Apr 25, 2024   1651 This patient does have evidence of infective focus  My overall impression is septic shock due to lactate > 4 and COURTNEY.  Source: Respiratory, Urinary Tract, and Abdominal  Antibiotics given- Antibiotics (72h ago, onward)    Start     Stop Route Frequency Ordered    04/25/24 1700  metronidazole IVPB 500 mg  (C. difficile Infection (CDI)   Treatment Order Panel)         04/26/24 0459 IV ED 1 Time 04/25/24 1640    04/25/24 1230  piperacillin-tazobactam 4.5 g in dextrose 5 % 100 mL IVPB   (ready to mix)  (ED Adult Sepsis Treatment)         04/26/24 1229 IV Every 8 hours (non-standard times) 04/25/24 1218      Latest lactate reviewed-  @[id number](lactate,poclac)@  Organ dysfunction indicated by Acute kidney injury    Fluid challenge Actual Body weight- Patient will receive 30ml/kg actual body weight to calculate fluid bolus for treatment of septic shock.     Post- resuscitation assessment Yes Perfusion exam was performed within 6 hours of septic shock presentation after bolus shows Adequate tissue perfusion assessed by non-invasive monitoring  [RZ]   1653 Critical Care:   Critical Care Times:   Direct Patient Care (initial evaluation, reassessments, and time considering the case)................................................................30 minutes.   Additional History  from reviewing old medical records or taking additional history from the family, EMS, PCP, etc.......................20 minutes.   Ordering, Reviewing, and Interpreting Diagnostic Studies...............................................................................................................20 minutes.   Documentation..................................................................................................................................................................................15 minutes.   ==============================================================  · Total Critical Care Time - exclusive of procedural time: 75 minutes.  ==============================================================  Critical care was necessary to treat or prevent imminent or life-threatening deterioration of the following conditions: sepsis, COURTNEY.   Critical care was time spent personally by me on the following activities: obtaining history from patient or relative, examination of patient, review of x-rays / CT sent with the patient, ordering lab, x-rays, and/or EKG, development of treatment plan with patient or relative, ordering and performing treatments and interventions, interpretation of cardiac measurements and re-evaluation of patient's conition.   Critical Care Condition: potentially life-threatening  [RZ]   1704 Discussed case with hospital medicine.  Will give medications to correct potassium.  Patient given IV fluids in the ED. will admit for further evaluation treatment of sepsis/COURTNEY/C diff/hyperkalemia.  Awaiting bed assignment. [RZ]      ED Course User Index  [RZ] Fouzia Mcmahon NP             CLINICAL IMPRESSION  1. Sepsis    2. SOB (shortness of breath)    3. Encounter for imaging study to confirm nasogastric (NG) tube placement    4. COURTNEY (acute kidney injury)    5. Gastrointestinal hemorrhage, unspecified gastrointestinal hemorrhage type    6. Aphasia         ED Disposition Condition    Admit Stable           This note was created using dictation software.  This program may occasionally mistype words and phrases.         Fouzia Mcmahon, SHWETA  04/25/24 2667

## 2024-04-25 NOTE — PROGRESS NOTES
"Pharmacist Renal Dose Adjustment Note    Steff Johnson is a 64 y.o. female being treated with the medication Meropenem    Patient Data:    Vital Signs (Most Recent):  Temp: (!) 102.3 °F (39.1 °C) (04/25/24 1246)  Pulse: 96 (04/25/24 1813)  Resp: (!) 26 (04/25/24 1813)  BP: 121/72 (04/25/24 1429)  SpO2: 96 % (04/25/24 1813) Vital Signs (72h Range):  Temp:  [102.3 °F (39.1 °C)]   Pulse:  []   Resp:  [25-38]   BP: (101-121)/(59-72)   SpO2:  [95 %-98 %]        Ht: 4' 11" (1.499 m)  Wt: 53.5 kg (118 lb)  Estimated Creatinine Clearance: 17.1 mL/min (A) (based on SCr of 2.5 mg/dL (H)).  Body mass index is 23.83 kg/m².    Per [facility] renal dosing protocol:     Previous Order: Meropenem 1 g Q8H    Will be changed to:     New Order: Meropenem 500 mg Q12H,    Due to: CrCl < 25 mL/min    Renal dose adjustments performed as noted above.    We will continue monitoring and adjusting as necessary.    Pharmacist: Jay Leija PharmD  Ext: [phone]      "

## 2024-04-25 NOTE — ASSESSMENT & PLAN NOTE
Essentially resolved/creatinine normal.    Patient with acute kidney injury/acute renal failure likely due to pre-renal azotemia due to IVVD 2/2 GIB and Sepsis. COURTNEY is currently stable. Baseline creatinine  0.7  - Labs reviewed- Renal function/electrolytes with Estimated Creatinine Clearance: 47.4 mL/min (based on SCr of 0.9 mg/dL). according to latest data. Monitor urine output and serial BMP and adjust therapy as needed. Avoid nephrotoxins and renally dose meds for GFR listed above.

## 2024-04-25 NOTE — ASSESSMENT & PLAN NOTE
GI consulted: S/P EGD: moderate/severe distal esophagitis, no bleeding.  Aspirin Eliquis has been resumed  Continue IV Protonix b.i.d.

## 2024-04-25 NOTE — ASSESSMENT & PLAN NOTE
Drop in hgb from 8.8 to 7.3 on repeat labs in the ER.  Will continue to monitor hgb Q6h, transfuse if hgb <7.  Protonix 80 mg iv given. Continue protonix iv, will order 40 mg bid iv.

## 2024-04-25 NOTE — CARE UPDATE
04/25/24 1813   Patient Assessment/Suction   Level of Consciousness (AVPU) responds to pain   Respiratory Effort Normal;Unlabored   Expansion/Accessory Muscles/Retractions no use of accessory muscles;no retractions;expansion symmetric   All Lung Fields Breath Sounds diminished   Rhythm/Pattern, Respiratory unlabored;pattern regular;depth regular;chest wiggle adequate;no shortness of breath reported   Cough Frequency no cough   PRE-TX-O2   Device (Oxygen Therapy) nasal cannula   Flow (L/min) (Oxygen Therapy) 3   SpO2 96 %   Pulse Oximetry Type Continuous   $ Pulse Oximetry - Multiple Charge Pulse Oximetry - Multiple   Pulse 96   Resp (!) 26   Aerosol Therapy   $ Aerosol Therapy Charges Aerosol Treatment;Initial Continuous   Daily Review of Necessity (SVN) completed   Treatment Route (SVN) oxygen;mask   Patient Position HOB elevated   Post Treatment Assessment (SVN) increased aeration   Signs of Intolerance (SVN) none   Breath Sounds Post-Respiratory Treatment   Throughout All Fields Post-Treatment All Fields   Post-treatment Heart Rate (beats/min) 98   Post-treatment Resp Rate (breaths/min) 26   Education   $ Education Bronchodilator;15 min

## 2024-04-25 NOTE — HPI
65 yo female with PMH of CVA with known baseline confusion and aphasia who was sent to the ER because of black drainage coming from around the PEG tube site and having black vomit.     On presentation to the ER, vitals showed 101/59, HR of 107, RR of 25 and fever of 102.3, spo2 of 95%. Cbc with wbc of 28 and anemia of 8.8. CMP showed acute renal failure with creatinine of of 2.5, anion gap of 19, hyponatermia of 133, potassium of 5.9. lactic acid of 6.1. Pt started to have projectile hematemesis. NGT was placed. Mathur was replaced after which pt developed hematuria. UA was grossly positive. C.diffi antigen is positive. EKG showed sinus tachycardia. CT C/A/P showed scattered nonspecific bronchiolitis in both lungs, with right lower lobes opacities potentially reflecting chronic bronchopneumonia, given similar appearance to CT of 12/11/2023. Pt was given the hyperkalemia procotol, 1.6 L of fluids, vanco/zosyn, and protonix 80 mg iv. She will be admitted to medicine for further management.

## 2024-04-25 NOTE — ASSESSMENT & PLAN NOTE
"Patient's FSGs are uncontrolled due to hyperglycemia on current medication regimen.  Last A1c reviewed-   Lab Results   Component Value Date    HGBA1C 7.8 (H) 12/05/2023     Most recent fingerstick glucose reviewed- No results for input(s): "POCTGLUCOSE" in the last 24 hours.  Current correctional scale  Low  Increase anti-hyperglycemic dose as follows-   Antihyperglycemics (From admission, onward)      Start     Stop Route Frequency Ordered    04/26/24 0000  insulin aspart U-100 pen 0-5 Units         -- SubQ Every 4 hours 04/25/24 2346    04/26/24 0000  insulin detemir U-100 (Levemir) pen 15 Units         -- SubQ Nightly 04/25/24 2347          Hold Oral hypoglycemics while patient is in the hospital.  -----------------------------------------------  Increase basal insulin to 18 units nightly and Increase SSI to medium dose    "

## 2024-04-25 NOTE — SUBJECTIVE & OBJECTIVE
Past Medical History:   Diagnosis Date    Arthritis     Complete tear of left rotator cuff 11/09/2017    COPD (chronic obstructive pulmonary disease)     COVID-19 infection 07/02/2021    Diabetes mellitus     H/o Cerebrovascular accident (CVA) of left pontine structure 12/12/2019    Hypertension     Personal history of colonic polyps     Stroke     Wears glasses        Past Surgical History:   Procedure Laterality Date    COLONOSCOPY N/A 4/11/2017    Procedure: COLONOSCOPY;  Surgeon: Jared Antonio MD;  Location: [facility] ENDO;  Service: Endoscopy;  Laterality: N/A;    CYSTOSCOPY W/ RETROGRADES N/A 12/14/2023    Procedure: CYSTOSCOPY, WITH RETROGRADE PYELOGRAM;  Surgeon: Sergio Soria MD;  Location: [facility] OR;  Service: Urology;  Laterality: N/A;    ECHOCARDIOGRAM,TRANSESOPHAGEAL N/A 12/12/2023    Procedure: Transesophageal echo (GT) intra-procedure log documentation;  Surgeon: Antoine Rivera MD;  Location: [facility] CATH/EP LAB;  Service: Cardiology;  Laterality: N/A;    ESOPHAGOGASTRODUODENOSCOPY N/A 2/25/2024    Procedure: EGD (ESOPHAGOGASTRODUODENOSCOPY);  Surgeon: Alexandru May MD;  Location: [facility] ENDO;  Service: Endoscopy;  Laterality: N/A;    HYSTERECTOMY      INSERTION OF CATHETER N/A 12/14/2023    Procedure: INSERTION, CATHETER;  Surgeon: Sergio Soria MD;  Location: [facility] OR;  Service: Urology;  Laterality: N/A;    OOPHORECTOMY      SHOULDER SURGERY      R    TRANSESOPHAGEAL ECHOCARDIOGRAM WITH POSSIBLE CARDIOVERSION (GT W/ POSS CARDIOVERSION) N/A 5/4/2023    Procedure: Transesophageal echo (GT) intra-procedure log documentation;  Surgeon: Blade Phillip MD;  Location: [facility] CATH/EP LAB;  Service: Cardiology;  Laterality: N/A;       Review of patient's allergies indicates:  No Known Allergies    Current Facility-Administered Medications   Medication Dose Route Frequency Provider Last Rate Last Admin    0.9%  NaCl infusion  1,000 mL Intravenous ED 1 Time Fouzia Mcmahon NP         albuterol nebulizer solution 10 mg  10 mg Nebulization ED 1 Time Fouzia Mcmahon, NP        calcium gluconate 1 g in NS IVPB (premixed)  1 g Intravenous Q10 Min PRN Fouzia Mcmahon, NP        dextrose 50% injection 25 g  25 g Intravenous PRN Lauri Causey DO        metronidazole IVPB 500 mg  500 mg Intravenous ED 1 Time Lauri Causey DO        pantoprazole 40 mg in  mL infusion (ready to mix)  8 mg/hr Intravenous Continuous Lauri Causey DO 20 mL/hr at 04/25/24 1558 8 mg/hr at 04/25/24 1558    piperacillin-tazobactam 4.5 g in dextrose 5 % 100 mL IVPB (ready to mix)  4.5 g Intravenous Q8H Fouzia Mcmahon NP   Stopped at 04/25/24 1345    promethazine (PHENERGAN) 25 mg in dextrose 5 % (D5W) 50 mL IVPB  25 mg Intravenous ED 1 Time Fouzia Mcmahon  mL/hr at 04/25/24 1748 25 mg at 04/25/24 1748     Current Outpatient Medications   Medication Sig Dispense Refill    allopurinoL (ZYLOPRIM) 100 MG tablet 100 mg by Per G Tube route once daily.      amLODIPine (NORVASC) 10 MG tablet 1 tablet (10 mg total) by Per G Tube route once daily.      apixaban (ELIQUIS) 2.5 mg Tab 2.5 mg by Per G Tube route 2 (two) times daily.      ascorbic acid, vitamin C, (VITAMIN C) 500 MG tablet 500 mg by Per G Tube route 2 (two) times daily.      atorvastatin (LIPITOR) 80 MG tablet 1 tablet (80 mg total) by Per G Tube route every evening. 30 tablet 0    folic acid (FOLVITE) 1 MG tablet 1 tablet by Per G Tube route every morning.      furosemide (LASIX) 40 MG tablet 40 mg by Per G Tube route once daily.      HUMALOG KWIKPEN INSULIN 100 unit/mL pen Inject 0-20 Units into the skin As instructed (inject 4 times daily per sliding scale). 0 -149 = 0 units  150 - 199 = 4 units  200 - 249 = 8 units  250 - 299 = 12 units  300 - 349 = 16 units  350 - 1000 = 20 units Call MD      insulin (LANTUS SOLOSTAR U-100 INSULIN) glargine 100 units/mL SubQ pen Inject 35 Units into the skin once daily.      insulin lispro 100 unit/mL injection  Inject 2 Units into the skin 3 (three) times daily before meals. 07:30  11:30  16:30      latanoprost 0.005 % ophthalmic solution Place 1 drop into both eyes once daily. (Patient taking differently: Place 1 drop into both eyes every evening.) 7.5 mL 6    levETIRAcetam (KEPPRA) 100 mg/mL Soln 500 mg by Per G Tube route 2 (two) times daily.      losartan (COZAAR) 100 MG tablet 1 tablet (100 mg total) by Per G Tube route once daily.      magnesium oxide (MAG-OX) 400 mg (241.3 mg magnesium) tablet 400 mg by Per G Tube route once daily.      metFORMIN (GLUCOPHAGE) 1000 MG tablet Take 1,000 mg by mouth 2 (two) times daily.      metoclopramide HCl (REGLAN) 5 mg/5 mL Soln 10 mg by Per G Tube route 3 (three) times daily.      metoprolol tartrate (LOPRESSOR) 25 MG tablet 25 mg by Per G Tube route 2 (two) times daily.      multivitamin (THERAGRAN) per tablet 1 tablet by Per G Tube route once daily.      omeprazole (PRILOSEC) 20 MG capsule Take 20 mg by mouth once daily. Per G-Tube      polyethylene glycol (GLYCOLAX) 17 gram PwPk 17 g by Per G Tube route once daily.      thiamine (VITAMIN B-1) 100 MG tablet 100 mg by Per G Tube route once daily.      zinc sulfate (ZINC-220 ORAL) 1 tablet by PEG Tube route Daily.      aspirin 81 MG Chew 1 tablet (81 mg total) by Per G Tube route once daily.      baclofen (LIORESAL) 5 mg Tab tablet 5 mg by Per G Tube route every 8 (eight) hours as needed (muscle spasms).      cloNIDine (CATAPRES) 0.1 MG tablet 1 tablet (0.1 mg total) by Per G Tube route 3 (three) times daily. 90 tablet 11    tiotropium (SPIRIVA) 18 mcg inhalation capsule Inhale 1 capsule (18 mcg total) into the lungs once daily. Controller 30 capsule 0     Family History       Problem Relation (Age of Onset)    Anemia Daughter    Asthma Mother    Diabetes Mother, Father, Brother    Hypertension Mother, Brother          Tobacco Use    Smoking status: Never    Smokeless tobacco: Never   Substance and Sexual Activity    Alcohol use:  No    Drug use: No    Sexual activity: Not Currently     Review of Systems   Unable to perform ROS: Mental status change (due to pt being unresponsive)     Objective:     Vital Signs (Most Recent):  Temp: (!) 102.3 °F (39.1 °C) (04/25/24 1246)  Pulse: 104 (04/25/24 1429)  Resp: (!) 38 (04/25/24 1429)  BP: 121/72 (04/25/24 1429)  SpO2: 98 % (04/25/24 1429) Vital Signs (24h Range):  Temp:  [102.3 °F (39.1 °C)] 102.3 °F (39.1 °C)  Pulse:  [104-107] 104  Resp:  [25-38] 38  SpO2:  [95 %-98 %] 98 %  BP: (101-121)/(59-72) 121/72     Weight: 53.5 kg (118 lb)  Body mass index is 23.83 kg/m².     Physical Exam  Vitals reviewed.   Constitutional:       Comments: Unresponsive at the time of my interview.    HENT:      Mouth/Throat:      Mouth: Mucous membranes are dry.   Eyes:      Pupils: Pupils are equal, round, and reactive to light.   Cardiovascular:      Rate and Rhythm: Regular rhythm. Tachycardia present.   Pulmonary:      Effort: Pulmonary effort is normal. No respiratory distress.      Breath sounds: Normal breath sounds.   Abdominal:      General: Abdomen is flat. Bowel sounds are normal.      Palpations: Abdomen is soft.   Musculoskeletal:      Cervical back: Neck supple.      Comments: Contracted lower extremities.    Skin:     General: Skin is warm.   Neurological:      Comments: Pt is unresponsive, neuro exam couldn't be assessed.    Psychiatric:      Comments: Pt is unresponsive, psych exam couldn't be assessed.               CRANIAL NERVES     CN III, IV, VI   Pupils are equal, round, and reactive to light.       Significant Labs: All pertinent labs within the past 24 hours have been reviewed.  Recent Lab Results  (Last 5 results in the past 24 hours)        04/25/24  1716   04/25/24  1701   04/25/24  1548   04/25/24  1526   04/25/24  1509        Baso #   0.05             Basophil %   0.2             C difficile Toxins A+B, EIA       Negative  Comment: Testing not recommended for children <24 months old.          C. diff Antigen       Positive         Differential Method   Automated             Eos #   0.2             Eos %   0.6             Gran # (ANC)   20.7             Gran %   83.0             Hematocrit   22.3             Hemoglobin   7.3             Immature Grans (Abs)   0.28  Comment: Mild elevation in immature granulocytes is non specific and   can be seen in a variety of conditions including stress response,   acute inflammation, trauma and pregnancy. Correlation with other   laboratory and clinical findings is essential.               Immature Granulocytes   1.1             Lactic Acid Level     4.9  Comment: Falsely low lactic acid results can be found in samples   containing >=13.0 mg/dL total bilirubin and/or >=3.5 mg/dL   direct bilirubin.  Critical result LAC 4.9 mmol/L called to and read back by Shweta Johnson ED, RN. at 25-Apr-2024 16:44 by [facility] Institute[facility].             Lymph #   2.3             Lymph %   9.3             MCH   26.4             MCHC   32.7             MCV   81             Mono #   1.5             Mono %   5.8             MPV   10.7             nRBC   0             Occult Blood         Positive       Platelet Count   560             POC Glucose 214               RBC   2.76             RDW   18.7             Stool WBC       No neutrophils seen         WBC   24.95                                    Significant Imaging: I have reviewed all pertinent imaging results/findings within the past 24 hours.

## 2024-04-25 NOTE — ED NOTES
Mathur present upon arrival. Mathur dc per provider order and new Mathur placed. Urine sample collected upon insertion of new Mathur.

## 2024-04-25 NOTE — ASSESSMENT & PLAN NOTE
2/2 gi bleed.  Monitor hgb q6h and transfuse if hgb <7.    Current CBC reviewed-   Lab Results   Component Value Date    HGB 7.3 (L) 04/25/2024    HCT 22.3 (L) 04/25/2024     Monitor serial CBC and transfuse if patient becomes hemodynamically unstable, symptomatic or H/H drops below 7/21.

## 2024-04-25 NOTE — PHARMACY MED REC
"Admission Medication History     The home medication history was taken by Sterling Damon.    You may go to "Admission" then "Reconcile Home Medications" tabs to review and/or act upon these items.     The home medication list has been updated by the Pharmacy department.   Please read ALL comments highlighted in yellow.   Please address this information as you see fit.    Feel free to contact us if you have any questions or require assistance.        Medications listed below were obtained from: Patient/family, [employer] [facility]- Eligible, and Nursing home  No current facility-administered medications on file prior to encounter.     Current Outpatient Medications on File Prior to Encounter   Medication Sig Dispense Refill    allopurinoL (ZYLOPRIM) 100 MG tablet 100 mg by Per G Tube route once daily.      amLODIPine (NORVASC) 10 MG tablet 1 tablet (10 mg total) by Per G Tube route once daily.      apixaban (ELIQUIS) 2.5 mg Tab 2.5 mg by Per G Tube route 2 (two) times daily.      ascorbic acid, vitamin C, (VITAMIN C) 500 MG tablet 500 mg by Per G Tube route 2 (two) times daily.      atorvastatin (LIPITOR) 80 MG tablet 1 tablet (80 mg total) by Per G Tube route every evening. 30 tablet 0    folic acid (FOLVITE) 1 MG tablet 1 tablet by Per G Tube route every morning.      furosemide (LASIX) 40 MG tablet 40 mg by Per G Tube route once daily.      HUMALOG KWIKPEN INSULIN 100 unit/mL pen Inject 0-20 Units into the skin As instructed (inject 4 times daily per sliding scale). 0 -149 = 0 units  150 - 199 = 4 units  200 - 249 = 8 units  250 - 299 = 12 units  300 - 349 = 16 units  350 - 1000 = 20 units Call MD      insulin (LANTUS SOLOSTAR U-100 INSULIN) glargine 100 units/mL SubQ pen Inject 35 Units into the skin once daily.      insulin lispro 100 unit/mL injection Inject 2 Units into the skin 3 (three) times daily before meals. 07:30  11:30  16:30      latanoprost 0.005 % ophthalmic solution Place 1 drop into both eyes once " daily. (Patient taking differently: Place 1 drop into both eyes every evening.) 7.5 mL 6    levETIRAcetam (KEPPRA) 100 mg/mL Soln 500 mg by Per G Tube route 2 (two) times daily.      losartan (COZAAR) 100 MG tablet 1 tablet (100 mg total) by Per G Tube route once daily.      magnesium oxide (MAG-OX) 400 mg (241.3 mg magnesium) tablet 400 mg by Per G Tube route once daily.      metFORMIN (GLUCOPHAGE) 1000 MG tablet Take 1,000 mg by mouth 2 (two) times daily.      metoclopramide HCl (REGLAN) 5 mg/5 mL Soln 10 mg by Per G Tube route 3 (three) times daily.      metoprolol tartrate (LOPRESSOR) 25 MG tablet 25 mg by Per G Tube route 2 (two) times daily.      multivitamin (THERAGRAN) per tablet 1 tablet by Per G Tube route once daily.      omeprazole (PRILOSEC) 20 MG capsule Take 20 mg by mouth once daily. Per G-Tube      polyethylene glycol (GLYCOLAX) 17 gram PwPk 17 g by Per G Tube route once daily.      thiamine (VITAMIN B-1) 100 MG tablet 100 mg by Per G Tube route once daily.      zinc sulfate (ZINC-220 ORAL) 1 tablet by PEG Tube route Daily.      [DISCONTINUED] metFORMIN (GLUCOPHAGE) 500 MG tablet 1,000 mg by Per G Tube route 2 (two) times daily with meals.      aspirin 81 MG Chew 1 tablet (81 mg total) by Per G Tube route once daily.      baclofen (LIORESAL) 5 mg Tab tablet 5 mg by Per G Tube route every 8 (eight) hours as needed (muscle spasms).      cloNIDine (CATAPRES) 0.1 MG tablet 1 tablet (0.1 mg total) by Per G Tube route 3 (three) times daily. 90 tablet 11    tiotropium (SPIRIVA) 18 mcg inhalation capsule Inhale 1 capsule (18 mcg total) into the lungs once daily. Controller 30 capsule 0    [DISCONTINUED] blood-glucose meter (TRUE METRIX GLUCOSE METER) kit Use as instructed 1 each 0    [DISCONTINUED] lancing device (TRUEDRAW LANCING DEVICE) Misc Inject 1 Device into the skin 2 (two) times daily. 1 each 3             Sterling Damon  EXT 1926                .

## 2024-04-25 NOTE — ASSESSMENT & PLAN NOTE
Suspect 2/2 UTI and c.diffi.   Del Rosario was replaced. UA from new del rosario is grossly positive. Urine and blood cx collected.   - CT C/A/P showed scattered nonspecific bronchiolitis in both lungs, with right lower lobes opacities potentially reflecting chronic bronchopneumonia, given similar appearance to CT of 12/11/2023.   - Continue vanco. Change zosyn to meropenem given h/o MDRO.   Start vanco oral for the c.diffi.  ID consult.   1/6 L of bolus given bolus.  Continue maintence fluid with 0.9 NS at 100 cc/hr.   Lactic acid is improving from 6.1 to 4.6, but remains elevated. Continue to trend.

## 2024-04-26 ENCOUNTER — ANESTHESIA (OUTPATIENT)
Dept: SURGERY | Facility: HOSPITAL | Age: 64
DRG: 698 | End: 2024-04-26
Payer: MEDICARE

## 2024-04-26 ENCOUNTER — ANESTHESIA EVENT (OUTPATIENT)
Dept: SURGERY | Facility: HOSPITAL | Age: 64
DRG: 698 | End: 2024-04-26
Payer: MEDICARE

## 2024-04-26 VITALS
DIASTOLIC BLOOD PRESSURE: 83 MMHG | SYSTOLIC BLOOD PRESSURE: 156 MMHG | OXYGEN SATURATION: 100 % | HEART RATE: 101 BPM | RESPIRATION RATE: 20 BRPM

## 2024-04-26 PROBLEM — L89.159 SACRAL DECUBITUS ULCER: Chronic | Status: ACTIVE | Noted: 2024-04-26

## 2024-04-26 LAB
ACINETOBACTER CALCOACETICUS/BAUMANNII COMPLEX: NOT DETECTED
ALBUMIN SERPL BCP-MCNC: 2.7 G/DL (ref 3.5–5.2)
ALP SERPL-CCNC: 68 U/L (ref 55–135)
ALT SERPL W/O P-5'-P-CCNC: 10 U/L (ref 10–44)
ANION GAP SERPL CALC-SCNC: 15 MMOL/L (ref 8–16)
AST SERPL-CCNC: 19 U/L (ref 10–40)
BACTEROIDES FRAGILIS: NOT DETECTED
BASOPHILS # BLD AUTO: 0.09 K/UL (ref 0–0.2)
BASOPHILS NFR BLD: 0.4 % (ref 0–1.9)
BILIRUB SERPL-MCNC: 0.9 MG/DL (ref 0.1–1)
BLD PROD TYP BPU: NORMAL
BLD PROD TYP BPU: NORMAL
BLOOD UNIT EXPIRATION DATE: NORMAL
BLOOD UNIT EXPIRATION DATE: NORMAL
BLOOD UNIT TYPE CODE: 9500
BLOOD UNIT TYPE CODE: 9500
BLOOD UNIT TYPE: NORMAL
BLOOD UNIT TYPE: NORMAL
BUN SERPL-MCNC: 96 MG/DL (ref 8–23)
CALCIUM SERPL-MCNC: 8.3 MG/DL (ref 8.7–10.5)
CANDIDA ALBICANS: NOT DETECTED
CANDIDA AURIS: NOT DETECTED
CANDIDA GLABRATA: NOT DETECTED
CANDIDA KRUSEI: NOT DETECTED
CANDIDA PARAPSILOSIS: NOT DETECTED
CANDIDA TROPICALIS: NOT DETECTED
CHLORIDE SERPL-SCNC: 101 MMOL/L (ref 95–110)
CO2 SERPL-SCNC: 20 MMOL/L (ref 23–29)
CODING SYSTEM: NORMAL
CODING SYSTEM: NORMAL
CREAT SERPL-MCNC: 3.1 MG/DL (ref 0.5–1.4)
CROSSMATCH INTERPRETATION: NORMAL
CROSSMATCH INTERPRETATION: NORMAL
CRYPTOCOCCUS NEOFORMANS/GATTII: NOT DETECTED
CTX-M GENE (ESBL PRODUCER): ABNORMAL
DIFFERENTIAL METHOD BLD: ABNORMAL
DISPENSE STATUS: NORMAL
DISPENSE STATUS: NORMAL
ENTEROBACTER CLOACAE COMPLEX: NOT DETECTED
ENTEROBACTERALES: NOT DETECTED
ENTEROCOCCUS FAECALIS: NOT DETECTED
ENTEROCOCCUS FAECIUM: NOT DETECTED
EOSINOPHIL # BLD AUTO: 0.1 K/UL (ref 0–0.5)
EOSINOPHIL NFR BLD: 0.5 % (ref 0–8)
ERYTHROCYTE [DISTWIDTH] IN BLOOD BY AUTOMATED COUNT: 16.6 % (ref 11.5–14.5)
ESCHERICHIA COLI: NOT DETECTED
EST. GFR  (NO RACE VARIABLE): 16 ML/MIN/1.73 M^2
GLUCOSE SERPL-MCNC: 202 MG/DL (ref 70–110)
GLUCOSE SERPL-MCNC: 241 MG/DL (ref 70–110)
GLUCOSE SERPL-MCNC: 293 MG/DL (ref 70–110)
GLUCOSE SERPL-MCNC: 308 MG/DL (ref 70–110)
GLUCOSE SERPL-MCNC: 59 MG/DL (ref 70–110)
GLUCOSE SERPL-MCNC: 70 MG/DL (ref 70–110)
GLUCOSE SERPL-MCNC: 76 MG/DL (ref 70–110)
GLUCOSE SERPL-MCNC: 89 MG/DL (ref 70–110)
GLUCOSE SERPL-MCNC: 91 MG/DL (ref 70–110)
HAEMOPHILUS INFLUENZAE: NOT DETECTED
HCT VFR BLD AUTO: 27.9 % (ref 37–48.5)
HGB BLD-MCNC: 10 G/DL (ref 12–16)
HGB BLD-MCNC: 10.9 G/DL (ref 12–16)
HGB BLD-MCNC: 9.4 G/DL (ref 12–16)
HGB BLD-MCNC: 9.7 G/DL (ref 12–16)
HGB BLD-MCNC: 9.7 G/DL (ref 12–16)
IMM GRANULOCYTES # BLD AUTO: 0.45 K/UL (ref 0–0.04)
IMM GRANULOCYTES NFR BLD AUTO: 1.9 % (ref 0–0.5)
IMP GENE (CARBAPENEM RESISTANT): ABNORMAL
KLEBSIELLA AEROGENES: NOT DETECTED
KLEBSIELLA OXYTOCA: NOT DETECTED
KLEBSIELLA PNEUMONIAE GROUP: NOT DETECTED
KPC RESISTANCE GENE (CARBAPENEM): ABNORMAL
LACTATE SERPL-SCNC: 3.3 MMOL/L (ref 0.5–1.9)
LISTERIA MONOCYTOGENES: NOT DETECTED
LYMPHOCYTES # BLD AUTO: 1.4 K/UL (ref 1–4.8)
LYMPHOCYTES NFR BLD: 5.7 % (ref 18–48)
MAGNESIUM SERPL-MCNC: 2 MG/DL (ref 1.6–2.6)
MCH RBC QN AUTO: 27.8 PG (ref 27–31)
MCHC RBC AUTO-ENTMCNC: 33.7 G/DL (ref 32–36)
MCR-1: ABNORMAL
MCV RBC AUTO: 83 FL (ref 82–98)
MEC A/C AND MREJ (MRSA): ABNORMAL
MEC A/C: ABNORMAL
MONOCYTES # BLD AUTO: 1.1 K/UL (ref 0.3–1)
MONOCYTES NFR BLD: 4.7 % (ref 4–15)
NDM GENE (CARBAPENEM RESISTANT): ABNORMAL
NEISSERIA MENINGITIDIS: NOT DETECTED
NEUTROPHILS # BLD AUTO: 20.9 K/UL (ref 1.8–7.7)
NEUTROPHILS NFR BLD: 86.8 % (ref 38–73)
NRBC BLD-RTO: 0 /100 WBC
NUM UNITS TRANS PACKED RBC: NORMAL
NUM UNITS TRANS PACKED RBC: NORMAL
OXA-48-LIKE (CARBAPENEM RESISTANT): ABNORMAL
PLATELET # BLD AUTO: 482 K/UL (ref 150–450)
PMV BLD AUTO: 10.1 FL (ref 9.2–12.9)
POTASSIUM SERPL-SCNC: 4.3 MMOL/L (ref 3.5–5.1)
PROT SERPL-MCNC: 6.6 G/DL (ref 6–8.4)
PROTEUS SPECIES: NOT DETECTED
PSEUDOMONAS AERUGINOSA: NOT DETECTED
RBC # BLD AUTO: 3.38 M/UL (ref 4–5.4)
SALMONELLA SP: NOT DETECTED
SERRATIA MARCESCENS: NOT DETECTED
SODIUM SERPL-SCNC: 136 MMOL/L (ref 136–145)
STAPHYLOCOCCUS AUREUS: NOT DETECTED
STAPHYLOCOCCUS EPIDERMIDIS: NOT DETECTED
STAPHYLOCOCCUS LUGDUNESIS: NOT DETECTED
STAPHYLOCOCCUS SPECIES: DETECTED
STENOTROPHOMONAS MALTOPHILIA: NOT DETECTED
STREPTOCOCCUS AGALACTIAE: NOT DETECTED
STREPTOCOCCUS PNEUMONIAE: NOT DETECTED
STREPTOCOCCUS PYOGENES: NOT DETECTED
STREPTOCOCCUS SPECIES: NOT DETECTED
VAN A/B (VRE GENE): ABNORMAL
VANCOMYCIN SERPL-MCNC: 16.8 UG/ML
VIM GENE (CARBAPENEM RESISTANT): ABNORMAL
WBC # BLD AUTO: 24.03 K/UL (ref 3.9–12.7)

## 2024-04-26 PROCEDURE — 37000008 HC ANESTHESIA 1ST 15 MINUTES: Performed by: INTERNAL MEDICINE

## 2024-04-26 PROCEDURE — 25000003 PHARM REV CODE 250: Performed by: HOSPITALIST

## 2024-04-26 PROCEDURE — 27000221 HC OXYGEN, UP TO 24 HOURS

## 2024-04-26 PROCEDURE — 31720 CLEARANCE OF AIRWAYS: CPT

## 2024-04-26 PROCEDURE — C9113 INJ PANTOPRAZOLE SODIUM, VIA: HCPCS | Performed by: EMERGENCY MEDICINE

## 2024-04-26 PROCEDURE — 85025 COMPLETE CBC W/AUTO DIFF WBC: CPT | Performed by: HOSPITALIST

## 2024-04-26 PROCEDURE — 99223 1ST HOSP IP/OBS HIGH 75: CPT | Mod: ,,, | Performed by: INTERNAL MEDICINE

## 2024-04-26 PROCEDURE — 85018 HEMOGLOBIN: CPT | Mod: 91 | Performed by: HOSPITALIST

## 2024-04-26 PROCEDURE — 80202 ASSAY OF VANCOMYCIN: CPT | Performed by: HOSPITALIST

## 2024-04-26 PROCEDURE — 80053 COMPREHEN METABOLIC PANEL: CPT | Performed by: HOSPITALIST

## 2024-04-26 PROCEDURE — 63600175 PHARM REV CODE 636 W HCPCS: Performed by: EMERGENCY MEDICINE

## 2024-04-26 PROCEDURE — 82962 GLUCOSE BLOOD TEST: CPT

## 2024-04-26 PROCEDURE — 94761 N-INVAS EAR/PLS OXIMETRY MLT: CPT

## 2024-04-26 PROCEDURE — 21000000 HC CCU ICU ROOM CHARGE

## 2024-04-26 PROCEDURE — 43235 EGD DIAGNOSTIC BRUSH WASH: CPT | Performed by: INTERNAL MEDICINE

## 2024-04-26 PROCEDURE — 63600175 PHARM REV CODE 636 W HCPCS: Performed by: HOSPITALIST

## 2024-04-26 PROCEDURE — 63600175 PHARM REV CODE 636 W HCPCS: Performed by: NURSE ANESTHETIST, CERTIFIED REGISTERED

## 2024-04-26 PROCEDURE — 25000003 PHARM REV CODE 250: Performed by: EMERGENCY MEDICINE

## 2024-04-26 PROCEDURE — 0DJ08ZZ INSPECTION OF UPPER INTESTINAL TRACT, VIA NATURAL OR ARTIFICIAL OPENING ENDOSCOPIC: ICD-10-PCS | Performed by: INTERNAL MEDICINE

## 2024-04-26 PROCEDURE — D9220A PRA ANESTHESIA: Mod: ANES,,, | Performed by: ANESTHESIOLOGY

## 2024-04-26 PROCEDURE — 36415 COLL VENOUS BLD VENIPUNCTURE: CPT | Performed by: HOSPITALIST

## 2024-04-26 PROCEDURE — D9220A PRA ANESTHESIA: Mod: CRNA,,, | Performed by: NURSE ANESTHETIST, CERTIFIED REGISTERED

## 2024-04-26 PROCEDURE — 85018 HEMOGLOBIN: CPT | Performed by: HOSPITALIST

## 2024-04-26 PROCEDURE — 25000003 PHARM REV CODE 250: Performed by: INTERNAL MEDICINE

## 2024-04-26 PROCEDURE — 36415 COLL VENOUS BLD VENIPUNCTURE: CPT | Performed by: NURSE PRACTITIONER

## 2024-04-26 PROCEDURE — 37000009 HC ANESTHESIA EA ADD 15 MINS: Performed by: INTERNAL MEDICINE

## 2024-04-26 PROCEDURE — 83735 ASSAY OF MAGNESIUM: CPT | Performed by: HOSPITALIST

## 2024-04-26 PROCEDURE — P9016 RBC LEUKOCYTES REDUCED: HCPCS | Performed by: HOSPITALIST

## 2024-04-26 PROCEDURE — 83605 ASSAY OF LACTIC ACID: CPT | Performed by: HOSPITALIST

## 2024-04-26 RX ORDER — PROPOFOL 10 MG/ML
VIAL (ML) INTRAVENOUS
Status: DISCONTINUED | OUTPATIENT
Start: 2024-04-26 | End: 2024-04-26

## 2024-04-26 RX ORDER — SODIUM CHLORIDE 9 MG/ML
INJECTION, SOLUTION INTRAVENOUS CONTINUOUS
Status: DISCONTINUED | OUTPATIENT
Start: 2024-04-26 | End: 2024-04-27

## 2024-04-26 RX ADMIN — SODIUM BICARBONATE: 84 INJECTION, SOLUTION INTRAVENOUS at 12:04

## 2024-04-26 RX ADMIN — SODIUM CHLORIDE, SODIUM LACTATE, POTASSIUM CHLORIDE, AND CALCIUM CHLORIDE: .6; .31; .03; .02 INJECTION, SOLUTION INTRAVENOUS at 03:04

## 2024-04-26 RX ADMIN — MEROPENEM 500 MG: 500 INJECTION, POWDER, FOR SOLUTION INTRAVENOUS at 08:04

## 2024-04-26 RX ADMIN — MUPIROCIN 1 G: 20 OINTMENT TOPICAL at 10:04

## 2024-04-26 RX ADMIN — Medication 10 MG: at 03:04

## 2024-04-26 RX ADMIN — Medication 30 MG: at 03:04

## 2024-04-26 RX ADMIN — DEXTROSE MONOHYDRATE 12.5 G: 25 INJECTION, SOLUTION INTRAVENOUS at 04:04

## 2024-04-26 RX ADMIN — PANTOPRAZOLE SODIUM 8 MG/HR: 40 INJECTION, POWDER, FOR SOLUTION INTRAVENOUS at 11:04

## 2024-04-26 RX ADMIN — CLONIDINE HYDROCHLORIDE 0.1 MG: 0.1 TABLET ORAL at 04:04

## 2024-04-26 RX ADMIN — SODIUM CHLORIDE 500 ML: 9 INJECTION, SOLUTION INTRAVENOUS at 12:04

## 2024-04-26 RX ADMIN — INSULIN ASPART 1 UNITS: 100 INJECTION, SOLUTION INTRAVENOUS; SUBCUTANEOUS at 02:04

## 2024-04-26 RX ADMIN — INSULIN ASPART 1 UNITS: 100 INJECTION, SOLUTION INTRAVENOUS; SUBCUTANEOUS at 06:04

## 2024-04-26 RX ADMIN — VANCOMYCIN HYDROCHLORIDE 125 MG: KIT at 06:04

## 2024-04-26 RX ADMIN — SODIUM CHLORIDE: 9 INJECTION, SOLUTION INTRAVENOUS at 11:04

## 2024-04-26 RX ADMIN — MUPIROCIN 1 G: 20 OINTMENT TOPICAL at 11:04

## 2024-04-26 RX ADMIN — PANTOPRAZOLE SODIUM 8 MG/HR: 40 INJECTION, POWDER, FOR SOLUTION INTRAVENOUS at 09:04

## 2024-04-26 RX ADMIN — PANTOPRAZOLE SODIUM 8 MG/HR: 40 INJECTION, POWDER, FOR SOLUTION INTRAVENOUS at 04:04

## 2024-04-26 RX ADMIN — DEXTROSE MONOHYDRATE 12.5 G: 25 INJECTION, SOLUTION INTRAVENOUS at 08:04

## 2024-04-26 RX ADMIN — LEVETIRACETAM 500 MG: 5 INJECTION INTRAVENOUS at 10:04

## 2024-04-26 RX ADMIN — MEROPENEM 500 MG: 500 INJECTION, POWDER, FOR SOLUTION INTRAVENOUS at 11:04

## 2024-04-26 RX ADMIN — INSULIN ASPART 2 UNITS: 100 INJECTION, SOLUTION INTRAVENOUS; SUBCUTANEOUS at 01:04

## 2024-04-26 RX ADMIN — VANCOMYCIN HYDROCHLORIDE 125 MG: KIT at 05:04

## 2024-04-26 RX ADMIN — VANCOMYCIN HYDROCHLORIDE 125 MG: KIT at 01:04

## 2024-04-26 NOTE — TRANSFER OF CARE
"Anesthesia Transfer of Care Note    Patient: Steff Johnson    Procedure(s) Performed: Procedure(s) (LRB):  EGD (ESOPHAGOGASTRODUODENOSCOPY) (N/A)    Patient location: GI    Anesthesia Type: general    Transport from OR: Transported from OR on room air with adequate spontaneous ventilation    Post pain: adequate analgesia    Post assessment: no apparent anesthetic complications    Post vital signs: stable    Level of consciousness: awake and alert    Nausea/Vomiting: no nausea/vomiting    Complications: none    Transfer of care protocol was followedComments: AAXO3 SV, exchanging well.  To PACU, VSS upon arrival. Report to RN       Last vitals: Visit Vitals  BP (!) 142/66 (BP Location: Left arm, Patient Position: Lying)   Pulse 97   Temp 37.3 °C (99.1 °F) (Temporal)   Resp 20   Ht 4' 11" (1.499 m)   Wt 53.5 kg (118 lb)   SpO2 100%   BMI 23.83 kg/m²     "

## 2024-04-26 NOTE — PROGRESS NOTES
Patient: Steff Johnson     MRN: [id number]    [facility] ACCT #: [id number]    : 1960 Age: 64 y.o. Sex:female  Room: 2528 Bed: 2528-a    Admit Date: 2024   Pt. Type: IP- Inpatient    Admitting Physician: Celine Buck MD    Attending Physician: Carlos Evans MD     Date of service: 2024        Admission diagnosis  Sepsis [A41.9]    Hospital course:  66 years old female patient with history of CVA, aphasic, dysphagia with PEG tube in place, chronic indwelling Mathur catheter, bed-bound status, nursing resident,  brought to the emergency room due to coffee-ground vomiting and also from PEG tube.  Patient has also been noted with loose stools.  NG tube was placed in the emergency room.  Stool study has been done which is positive for C difficile antigen.   CT scan of chest is suggestive of nonspecific bronchiolitis in both lungs, with right lower lobes opacities potentially reflecting chronic bronchopneumonia, similar to previous CT scan in 2023. Mathur catheter has been replaced in the ED. Rectal tube was also placed in the ED.    Subjective:   Nonverbal patient.  Patient was febrile yesterday.    Review of systems   unobtainable    Medications    Current Facility-Administered Medications:     0.9%  NaCl infusion (for blood administration), , Intravenous, Q24H PRN, Celine Buck MD    0.9%  NaCl infusion, , Intravenous, Continuous, Alejandro Alvarenga MD, Last Rate: 100 mL/hr at 24 1111, New Bag at 24 1111    acetaminophen tablet 650 mg, 650 mg, Oral, Q4H PRN, Celine Buck MD    aluminum-magnesium hydroxide-simethicone 200-200-20 mg/5 mL suspension 30 mL, 30 mL, Oral, QID PRN, Celine Buck MD    [COMPLETED] calcium gluconate 1 g in NS IVPB (premixed), 1 g, Intravenous, ED 1 Time, Stopped at 24 0534 **AND** calcium gluconate 1 g in NS IVPB (premixed), 1 g, Intravenous, Q10 Min PRN, Zeevi, Fouzia L., NP    cloNIDine tablet 0.1 mg, 0.1 mg, Oral, Q8H PRN, Celine Buck MD     dextrose 50% injection 12.5 g, 12.5 g, Intravenous, PRN, Celine Buck MD    [COMPLETED] dextrose 50% injection 50 g, 50 g, Intravenous, Once, 50 g at 04/25/24 1744 **AND** dextrose 50% injection 25 g, 25 g, Intravenous, PRN **AND** [COMPLETED] insulin regular injection 5.35 Units 0.0535 mL, 0.1 Units/kg, Intravenous, Once, Lauri Causey, DO, 5.35 Units at 04/25/24 1744    dextrose 50% injection 25 g, 25 g, Intravenous, PRN, Celine Buck MD    glucagon (human recombinant) injection 1 mg, 1 mg, Intramuscular, PRNHeber Mira S., MD    glucose chewable tablet 16 g, 16 g, Oral, PRNHeber Mira S., MD    glucose chewable tablet 24 g, 24 g, Oral, PRNHeber Mira S., MD    HYDROcodone-acetaminophen 5-325 mg per tablet 1 tablet, 1 tablet, Oral, Q6H PRN, Celine Buck MD    insulin aspart U-100 pen 0-5 Units, 0-5 Units, Subcutaneous, Q4H, Celine Buck MD, 1 Units at 04/26/24 0606    insulin detemir U-100 (Levemir) pen 15 Units, 15 Units, Subcutaneous, QHS, Celine Buck MD, 10 Units at 04/26/24 0110    levETIRAcetam in NaCl (iso-os) IVPB 500 mg, 500 mg, Intravenous, Q12H, Celine Buck MD, Stopped at 04/25/24 2229    magnesium oxide tablet 800 mg, 800 mg, Oral, PRN, Celine Buck MD    magnesium oxide tablet 800 mg, 800 mg, Oral, PRNHeber Mira S., MD    melatonin tablet 6 mg, 6 mg, Oral, Nightly PRNHeber Mira S., MD    meropenem 500 mg in sodium chloride 0.9 % 100 mL IVPB (ready to mix system), 500 mg, Intravenous, Q12H, Celine Buck MD, Stopped at 04/26/24 1245    mupirocin 2 % ointment, , Nasal, BID, Celine Buck MD, 1 g at 04/26/24 1149    naloxone 0.4 mg/mL injection 0.02 mg, 0.02 mg, Intravenous, PRN, Celine Buck MD    ondansetron injection 4 mg, 4 mg, Intravenous, Q6H PRN, Celine Buck MD    pantoprazole 40 mg in  mL infusion (ready to mix), 8 mg/hr, Intravenous, Continuous, Lauri Causey DO, Last Rate: 20 mL/hr at 04/26/24 1149, 8 mg/hr at 04/26/24 1149    potassium  bicarbonate disintegrating tablet 35 mEq, 35 mEq, Oral, PRN, Celine Buck MD    potassium bicarbonate disintegrating tablet 50 mEq, 50 mEq, Oral, PRAUDREY, Celine Buck MD    potassium bicarbonate disintegrating tablet 60 mEq, 60 mEq, Oral, SHANDRAN, Celine Buck MD    potassium, sodium phosphates 280-160-250 mg packet 2 packet, 2 packet, Oral, PRN, Celine Buck MD    potassium, sodium phosphates 280-160-250 mg packet 2 packet, 2 packet, Oral, PRNHeber Mira S., MD    potassium, sodium phosphates 280-160-250 mg packet 2 packet, 2 packet, Oral, Heber [place] Mira S., MD    sodium chloride 0.9% flush 10 mL, 10 mL, Intravenous, Q12H PRN, Celine Buck MD    Pharmacy to dose Vancomycin consult, , , Once **AND** vancomycin - pharmacy to dose, , Intravenous, pharmacy to manage frequency, Celine Buck MD    vancomycin 125 mg/5 mL oral solution 125 mg, 125 mg, Per G Tube, Q6H, Celine Buck MD, 125 mg at 04/26/24 1334     Last Recorded Vitals  Vitals:    04/26/24 1100   BP: (!) 122/58   Pulse: 100   Resp: (!) 24   Temp: 100 °F (37.8 °C)        Physical Exam  General:   frail elderly patient in bed; unable to interact  HEENT:  Head is normocephalic atraumatic, PERRLA, EOMI, oral cavity moist  Neck:  Supple, no thyromegaly, trachea midline position, no JVD noted   Respiratory:   fair bilateral breath sounds; on oxygen via nasal cannula 3 L; few rales bilaterally   Cardiovascular:  tachycardic, regular rhythm, S1-S2 normal, no murmurs or rubs or gallops noted  Abdomen:  NG tube in place with darkish output;  PEG tube noted with also dark output; rectal tube in place with semi liquid brownish mixed  with some blood  Genitourinary:   Mathur catheter in place with blood stained urine   Skin:                 Musculoskeletal:  bed-bound; quadriplegia  CNS:   awake in bed; noninteractive;  nonverbal; quadriplegia       Laboratory data:   Recent Labs   Lab 04/25/24  1223 04/25/24  1701 04/25/24  1854 04/26/24  0405  04/26/24  0608 04/26/24  1124   WBC 28.38* 24.95*  --  24.03*  --   --    HGB 8.8* 7.3*   < > 9.4* 10.0* 9.7*   HCT 27.0* 22.3*  --  27.9*  --   --    * 560*  --  482*  --   --     < > = values in this interval not displayed.       Recent Labs   Lab 04/25/24  1223 04/25/24  2146 04/26/24  0405   * 135* 136   K 5.9* 4.1 4.3   CL 96 104 101   CO2 18* 11* 20*   BUN 93* 84* 96*   CREATININE 2.5* 2.7* 3.1*   CALCIUM 9.7 7.4* 8.3*   PROT 8.0  --  6.6   BILITOT 0.6  --  0.9   ALKPHOS 89  --  68   ALT 15  --  10   AST 18  --  19       Imaging studies:  X-Ray Chest AP Portable  Narrative: EXAMINATION:  XR CHEST AP PORTABLE    CLINICAL HISTORY:  check NG tube placement;    FINDINGS:  Portable chest with comparison chest x-ray 04/25/2024.  Normal cardiomediastinal silhouette.Mild hazy opacification of the right lung base with blunting of the costophrenic angle.  Left lung is clear.  The apices of the lungs are also the field view.  Nasogastric tube tip and side port below the gastroesophageal junction.  Pulmonary vasculature is normal. No acute osseous abnormality.  Impression: Mild hazy opacification of the right lung base with blunting of the costophrenic angle.  This could reflect atelectasis, infiltrate, pleural effusion or combination there of.    Electronically signed by: Messi Hollingsworth  Date:    04/26/2024  Time:    11:28       Assessement and Plan:  Active Hospital Problems    Diagnosis  POA    *GI hemorrhage [K92.2]  Yes    Sacral decubitus ulcer [L89.159]  Yes     Chronic    Severe sepsis [A41.9, R65.20]  Yes    Hyperkalemia [E87.5]  Yes    Acute blood loss anemia [D62]  Yes    Complicated UTI (urinary tract infection) [N39.0]  Yes    Clostridium difficile infection [A49.8]  Yes    Hematuria [R31.9]  Yes    Hypertension [I10]  Yes    Type 2 diabetes mellitus with neurologic complication, with long-term current use of insulin [E11.49, Z79.4]  Not Applicable    Acute renal failure [N17.9]  Yes    Seizure  [R56.9]  Yes    CVA (cerebral vascular accident) [I63.9]  Yes    Aphasia [R47.01]  Yes      Resolved Hospital Problems   No resolved problems to display.          Severe sepsis   Suspected due to complicated UTI and C difficile colitis   History of MDRO and ESBL   WBC:28-->24   Lactic acid:  6.1----->3.3     Positive C difficile antigen   Started  on meropenem and oral vancomycin   IV fluids    Urine culture:  Gram-negative rods   Blood culture:  Gram-positive cocci   Mathur catheter has been exchanged in the emergency room   Contact isolation precautions   ID consulted       Suspected upper GI bleed   80 mg IV pantoprazole given;  pantoprazole IV drip   Aspirin and Eliquis on hold   NG tube in place; low intermittent suction   GI consulted     Acute blood loss anemia   Hemoglobin: 8.8--> 6.2-->10.0-->9.7    Transfused 2 units of PRBC   Monitor hemoglobin/hematocrit     Acute renal failure   Likely due to loose stools and GI bleed   Creatinine: 0.8-->2.5-->3.1    Monitor closely   Nephrology will be involved if needed     Diabetes mellitus   Glucose checks and supplemental insulin as needed   Get hemoglobin A1c   Currently on insulin detemir 15 units night     Hyperkalemia, resolved  K:  5.9-->4.3     Gross Hematuria    Traumatic Mathur placement as per report   Improving      History of seizure   On levetiracetam     History of CVA   Nonverbal   Bed-bound  Quadriplegia   Aspirin and Eliquis on hold   Aspiration precautions     Dysphagia   Peg tube in place   Clamp for now   Feedings will be resumed after GI evaluation     Sacrococcygeal decubitus ulcers   Minimal skin breakdown in left leg and right foot   Frequent turning      Hypertension    Home antihypertensives on hold for now      DVT Prophylaxis:   SCDs      Code status: Full Code    Disposition: await clinical improvement; eventual discharge back to skilled nursing facility    Disclaimer:  The above note was dictated utilizing speech recognition software.   Efforts were made to minimize and correct any transcription errors.     Carlos Evans MD

## 2024-04-26 NOTE — CONSULTS
INPATIENT NEPHROLOGY CONSULT   [facility]    Patient Name: Steff Johnson  Date: 04/26/2024    Reason for consultation: COURTNEY    Chief Complaint:   Chief Complaint   Patient presents with    GI Problem     Brown fluid being withdrawn from peg tube, vomiting brown fluid. Smells of feces. Since this morning. Pt is nonverbal       History of Present Illness:  63 yo female with PMH of CVA with known baseline confusion and aphasia who was sent to the ER because of black drainage coming from around the PEG tube site and having black vomit. Pt started to have projectile hematemesis in the ER. NGT was placed. Mathur was replaced after which pt developed hematuria. UA was grossly positive. C.diffi antigen is positive. CT C/A/P showed scattered nonspecific bronchiolitis in both lungs, with right lower lobes opacities potentially reflecting chronic bronchopneumonia, given similar appearance to CT of 12/11/2023. Consulted for COURTNEY.    Interval History:  4/26- febrile and hypotensive, at admission, on 3L NC, UOP 500cc, Hb 6.2- got 2u of blood- Hb improved    Plan of Care:    Assessment:  COURTNEY  Acute GIB  C diff diarrhea  Hyperkalemia/Acidosis (lactic)  Hyponatremia  Hx of HTN    Plan:    - suspect COURTNEY due to hemodynamic injury from hypotension, GI losses, and ABLA  - agree with IVFs, antbx and blood transfusion  - s/p medical management of electrolyte derangements  - trend LA with above  - trend serum Na with above  - dose meds for CrCl < 20  - no nsaids or IV contrast- strict ins/outs  - hold norvasc, losartan, lasix    Thank you for allowing us to participate in this patient's care. We will continue to follow.    Vital Signs:  Temp Readings from Last 3 Encounters:   04/26/24 98.5 °F (36.9 °C)   02/29/24 98.9 °F (37.2 °C) (Oral)   02/14/24 98.5 °F (36.9 °C) (Oral)       Pulse Readings from Last 3 Encounters:   04/26/24 109   02/29/24 74   02/14/24 82       BP Readings from Last 3 Encounters:   04/26/24 139/62    02/29/24 139/74   02/14/24 (!) 152/70       Weight:  Wt Readings from Last 3 Encounters:   04/25/24 53.5 kg (118 lb)   02/23/24 54.2 kg (119 lb 7.8 oz)   02/14/24 61.7 kg (136 lb)       INS/OUTS:  I/O last 3 completed shifts:  In: 3920.8 [I.V.:685.8; Blood:600; IV Piggyback:2635]  Out: 500 [Urine:500]  No intake/output data recorded.    Past Medical & Surgical History:  Past Medical History:   Diagnosis Date    Arthritis     Complete tear of left rotator cuff 11/09/2017    COPD (chronic obstructive pulmonary disease)     COVID-19 infection 07/02/2021    Diabetes mellitus     H/o Cerebrovascular accident (CVA) of left pontine structure 12/12/2019    Hypertension     Personal history of colonic polyps     Stroke     Wears glasses        Past Surgical History:   Procedure Laterality Date    COLONOSCOPY N/A 4/11/2017    Procedure: COLONOSCOPY;  Surgeon: Jared Antonio MD;  Location: [facility] ENDO;  Service: Endoscopy;  Laterality: N/A;    CYSTOSCOPY W/ RETROGRADES N/A 12/14/2023    Procedure: CYSTOSCOPY, WITH RETROGRADE PYELOGRAM;  Surgeon: Sergio Soria MD;  Location: [facility] OR;  Service: Urology;  Laterality: N/A;    ECHOCARDIOGRAM,TRANSESOPHAGEAL N/A 12/12/2023    Procedure: Transesophageal echo (GT) intra-procedure log documentation;  Surgeon: Antoine Rivera MD;  Location: [facility] CATH/EP LAB;  Service: Cardiology;  Laterality: N/A;    ESOPHAGOGASTRODUODENOSCOPY N/A 2/25/2024    Procedure: EGD (ESOPHAGOGASTRODUODENOSCOPY);  Surgeon: Alexandru May MD;  Location: [facility] ENDO;  Service: Endoscopy;  Laterality: N/A;    HYSTERECTOMY      INSERTION OF CATHETER N/A 12/14/2023    Procedure: INSERTION, CATHETER;  Surgeon: Sergio Soria MD;  Location: [facility] OR;  Service: Urology;  Laterality: N/A;    OOPHORECTOMY      SHOULDER SURGERY      R    TRANSESOPHAGEAL ECHOCARDIOGRAM WITH POSSIBLE CARDIOVERSION (GT W/ POSS CARDIOVERSION) N/A 5/4/2023    Procedure: Transesophageal echo (GT) intra-procedure log  documentation;  Surgeon: Blade Phillip MD;  Location: [facility] CATH/EP LAB;  Service: Cardiology;  Laterality: N/A;       Past Social History:  Social History     Socioeconomic History    Marital status: Single   Occupational History     Comment: disabled due to shoulder problems   Tobacco Use    Smoking status: Never    Smokeless tobacco: Never   Substance and Sexual Activity    Alcohol use: No    Drug use: No    Sexual activity: Not Currently     Social Determinants of Health     Financial Resource Strain: Low Risk  (12/6/2023)    Overall Financial Resource Strain (CARDIA)     Difficulty of Paying Living Expenses: Not hard at all   Food Insecurity: No Food Insecurity (12/6/2023)    Hunger Vital Sign     Worried About Running Out of Food in the Last Year: Never true     Ran Out of Food in the Last Year: Never true   Transportation Needs: No Transportation Needs (12/6/2023)    PRAPARE - Transportation     Lack of Transportation (Medical): No     Lack of Transportation (Non-Medical): No   Physical Activity: Sufficiently Active (12/6/2023)    Exercise Vital Sign     Days of Exercise per Week: 5 days     Minutes of Exercise per Session: 150+ min   Stress: No Stress Concern Present (12/6/2023)    [employer] [place] - Occupational Stress Questionnaire     Feeling of Stress : Only a little   Social Connections: Moderately Isolated (12/6/2023)    Social Connection and Isolation Panel [NHANES]     Frequency of Communication with Friends and Family: Once a week     Frequency of Social Gatherings with Friends and Family: Once a week     Attends Jainism Services: More than 4 times per year     Active Member of Clubs or Organizations: No     Attends Club or Organization Meetings: Never     Marital Status: Living with partner   Housing Stability: Low Risk  (12/6/2023)    Housing Stability Vital Sign     Unable to Pay for Housing in the Last Year: No     Number of Places Lived in the Last Year: 2      Unstable Housing in the Last Year: No       Medications:  Scheduled Meds:  Current Facility-Administered Medications   Medication Dose Route Frequency    insulin aspart U-100  0-5 Units Subcutaneous Q4H    insulin detemir U-100  15 Units Subcutaneous QHS    levETIRAcetam (Keppra) IV (PEDS and ADULTS)  500 mg Intravenous Q12H    meropenem IV (PEDS and ADULTS)  500 mg Intravenous Q12H    mupirocin   Nasal BID    vancomycin  125 mg Per G Tube Q6H     Continuous Infusions:  Current Facility-Administered Medications   Medication Dose Route Frequency Last Rate Last Admin    pantoprazole (PROTONIX) IV infusion  8 mg/hr Intravenous Continuous 20 mL/hr at 04/26/24 0453 8 mg/hr at 04/26/24 0453    sodium bicarbonate 150 mEq in dextrose 5 % (D5W) 1,000 mL infusion   Intravenous Continuous 75 mL/hr at 04/26/24 0045 New Bag at 04/26/24 0045     PRN Meds:.  Current Facility-Administered Medications:     0.9%  NaCl infusion (for blood administration), , Intravenous, Q24H PRN    acetaminophen, 650 mg, Oral, Q4H PRN    aluminum-magnesium hydroxide-simethicone, 30 mL, Oral, QID PRN    [COMPLETED] calcium gluconate IVPB, 1 g, Intravenous, ED 1 Time **AND** calcium gluconate IVPB, 1 g, Intravenous, Q10 Min PRN    cloNIDine, 0.1 mg, Oral, Q8H PRN    dextrose 50%, 12.5 g, Intravenous, PRN    [COMPLETED] dextrose 50%, 50 g, Intravenous, Once **AND** dextrose 50%, 25 g, Intravenous, PRN **AND** [COMPLETED] insulin regular, 0.1 Units/kg, Intravenous, Once    dextrose 50%, 25 g, Intravenous, PRN    glucagon (human recombinant), 1 mg, Intramuscular, PRN    glucose, 16 g, Oral, PRN    glucose, 24 g, Oral, PRN    HYDROcodone-acetaminophen, 1 tablet, Oral, Q6H PRN    magnesium oxide, 800 mg, Oral, PRN    magnesium oxide, 800 mg, Oral, PRN    melatonin, 6 mg, Oral, Nightly PRN    naloxone, 0.02 mg, Intravenous, PRN    ondansetron, 4 mg, Intravenous, Q6H PRN    potassium bicarbonate, 35 mEq, Oral, PRN    potassium bicarbonate, 50 mEq, Oral, PRN     potassium bicarbonate, 60 mEq, Oral, PRN    potassium, sodium phosphates, 2 packet, Oral, PRN    potassium, sodium phosphates, 2 packet, Oral, PRN    potassium, sodium phosphates, 2 packet, Oral, PRN    sodium chloride 0.9%, 10 mL, Intravenous, Q12H PRN    Pharmacy to dose Vancomycin consult, , , Once **AND** vancomycin - pharmacy to dose, , Intravenous, pharmacy to manage frequency  No current facility-administered medications on file prior to encounter.     Current Outpatient Medications on File Prior to Encounter   Medication Sig Dispense Refill    allopurinoL (ZYLOPRIM) 100 MG tablet 100 mg by Per G Tube route once daily.      amLODIPine (NORVASC) 10 MG tablet 1 tablet (10 mg total) by Per G Tube route once daily.      apixaban (ELIQUIS) 2.5 mg Tab 2.5 mg by Per G Tube route 2 (two) times daily.      ascorbic acid, vitamin C, (VITAMIN C) 500 MG tablet 500 mg by Per G Tube route 2 (two) times daily.      atorvastatin (LIPITOR) 80 MG tablet 1 tablet (80 mg total) by Per G Tube route every evening. 30 tablet 0    folic acid (FOLVITE) 1 MG tablet 1 tablet by Per G Tube route every morning.      furosemide (LASIX) 40 MG tablet 40 mg by Per G Tube route once daily.      HUMALOG KWIKPEN INSULIN 100 unit/mL pen Inject 0-20 Units into the skin As instructed (inject 4 times daily per sliding scale). 0 -149 = 0 units  150 - 199 = 4 units  200 - 249 = 8 units  250 - 299 = 12 units  300 - 349 = 16 units  350 - 1000 = 20 units Call MD      insulin (LANTUS SOLOSTAR U-100 INSULIN) glargine 100 units/mL SubQ pen Inject 35 Units into the skin once daily.      insulin lispro 100 unit/mL injection Inject 2 Units into the skin 3 (three) times daily before meals. 07:30  11:30  16:30      latanoprost 0.005 % ophthalmic solution Place 1 drop into both eyes once daily. (Patient taking differently: Place 1 drop into both eyes every evening.) 7.5 mL 6    levETIRAcetam (KEPPRA) 100 mg/mL Soln 500 mg by Per G Tube route 2 (two) times daily.  "     losartan (COZAAR) 100 MG tablet 1 tablet (100 mg total) by Per G Tube route once daily.      magnesium oxide (MAG-OX) 400 mg (241.3 mg magnesium) tablet 400 mg by Per G Tube route once daily.      metFORMIN (GLUCOPHAGE) 1000 MG tablet Take 1,000 mg by mouth 2 (two) times daily.      metoclopramide HCl (REGLAN) 5 mg/5 mL Soln 10 mg by Per G Tube route 3 (three) times daily.      metoprolol tartrate (LOPRESSOR) 25 MG tablet 25 mg by Per G Tube route 2 (two) times daily.      multivitamin (THERAGRAN) per tablet 1 tablet by Per G Tube route once daily.      omeprazole (PRILOSEC) 20 MG capsule Take 20 mg by mouth once daily. Per G-Tube      polyethylene glycol (GLYCOLAX) 17 gram PwPk 17 g by Per G Tube route once daily.      thiamine (VITAMIN B-1) 100 MG tablet 100 mg by Per G Tube route once daily.      zinc sulfate (ZINC-220 ORAL) 1 tablet by PEG Tube route Daily.      aspirin 81 MG Chew 1 tablet (81 mg total) by Per G Tube route once daily.      baclofen (LIORESAL) 5 mg Tab tablet 5 mg by Per G Tube route every 8 (eight) hours as needed (muscle spasms).      cloNIDine (CATAPRES) 0.1 MG tablet 1 tablet (0.1 mg total) by Per G Tube route 3 (three) times daily. 90 tablet 11    tiotropium (SPIRIVA) 18 mcg inhalation capsule Inhale 1 capsule (18 mcg total) into the lungs once daily. Controller 30 capsule 0    [DISCONTINUED] blood-glucose meter (TRUE METRIX GLUCOSE METER) kit Use as instructed 1 each 0    [DISCONTINUED] lancing device (TRUEDRAW LANCING DEVICE) Misc Inject 1 Device into the skin 2 (two) times daily. 1 each 3       Allergies:  Patient has no known allergies.    Past Family History:  Reviewed; refer to Hospitalist Admission Note    Review of Systems:  Review of Systems - All 14 systems reviewed and negative, except as noted in HPI    Physical Exam:  /62   Pulse 109   Temp 98.5 °F (36.9 °C)   Resp (!) 33   Ht 4' 11" (1.499 m)   Wt 53.5 kg (118 lb)   SpO2 100%   BMI 23.83 kg/m²     General " Appearance:    Ill   Head:    Normocephalic, atraumatic   Eyes:    Closed    Mouth:   Dry MM   Back:     Cannot assess   Lungs:     Clear    Heart:    Tachy   Abdomen:     Soft, non-tender, non-distended   Extremities:   Warm and well perfused   MSK:   No joint or muscle swelling, tenderness or deformity   Skin:   Skin color, texture, turgor normal, no rashes or lesions   Neurologic/Psychiatric:   Cannot assess     Results:  Lab Results   Component Value Date     04/26/2024    K 4.3 04/26/2024     04/26/2024    CO2 20 (L) 04/26/2024    BUN 96 (H) 04/26/2024    CREATININE 3.1 (H) 04/26/2024    CALCIUM 8.3 (L) 04/26/2024    ANIONGAP 15 04/26/2024    ESTGFRAFRICA 93 09/01/2023    EGFRNONAA 59.7 (A) 05/20/2022       Lab Results   Component Value Date    CALCIUM 8.3 (L) 04/26/2024    PHOS 3.8 04/25/2024       Recent Labs   Lab 04/26/24  0405 04/26/24  0608   WBC 24.03*  --    RBC 3.38*  --    HGB 9.4* 10.0*   HCT 27.9*  --    *  --    MCV 83  --    MCH 27.8  --    MCHC 33.7  --        I have personally reviewed pertinent radiological imaging and reports.    I have spent > 70 minutes providing care for this patient for the above diagnoses. These services have included chart/data/imaging review, evaluation, exam, formulation of plan, , note preparation, and discussions with staff involved in this patient's care.    Alejandro Alvarenga MD MPH  [facility]  [address] [place] [id number]  [phone] (p)  [phone] (f)

## 2024-04-26 NOTE — PLAN OF CARE
Unable to meet with patient at bedside so spoke with Leonie Johnson (Healthcare Power of )  [phone] (Mobile) on phone to complete initial assessment. Patient / family reports patient DOES NOT have a living will and Leonie Johnson (Healthcare Power of )  [phone] (Mobile) is medical POA.     Patient is a resident of [facility] and will return there upon d/c.    [facility]  Initial Discharge Assessment       Primary Care Provider: Cristina Ren MD    Admission Diagnosis: Sepsis [A41.9]    Admission Date: 4/25/2024  Expected Discharge Date:     Transition of Care Barriers: (P) None    Payor: MEDICARE / Plan: MEDICARE PART A & B / Product Type: Government /     Extended Emergency Contact Information  Primary Emergency Contact: Leonie Johnson  Mobile Phone: [phone]  Relation: Healthcare Power of   Preferred language: English   needed? No  Secondary Emergency Contact: [facility] Phone: [phone]  Mobile Phone: [phone]  Relation: Friend   needed? No    Discharge Plan A: (P) Return to nursing home  Discharge Plan B: (P) Return to Nursing Home      [facility] - [place], [place] - [address]  [address] [address]  Phone: [phone] Fax: [phone]    [facility]  [address]  [place] [address]  Phone: [phone] Fax: [phone]    [id number] - [place], [place] - [address] [facility]  [address] [facility] [address]  Phone: [phone] Fax: [phone]    [facility] - [place], [place] - [address]  [address]  [address]  [place] [address]  Phone: [phone] Fax: [phone]      Initial Assessment (most recent)       Adult Discharge Assessment - 04/26/24 1547          Discharge Assessment    Assessment Type Discharge Planning Assessment (P)      Confirmed/corrected  address, phone number and insurance Yes (P)      Confirmed Demographics Correct on Facesheet (P)      Source of Information family (P)      Communicated ALEX with patient/caregiver No (P)      Reason For Admission GI hemmorhage (P)      People in Home facility resident (P)      Facility Arrived From: [facility] (P)      Do you expect to return to your current living situation? Yes (P)      Do you have help at home or someone to help you manage your care at home? Yes (P)      Who are your caregiver(s) and their phone number(s)? facility staff (P)      Walking or Climbing Stairs Difficulty yes (P)      Walking or Climbing Stairs ambulation difficulty, dependent;transferring difficulty, dependent (P)      Dressing/Bathing Difficulty yes (P)      Dressing/Bathing bathing difficulty, dependent;dressing difficulty, dependent (P)      Equipment Currently Used at Home hospital bed (P)      Readmission within 30 days? Yes (P)      Patient currently being followed by outpatient case management? No (P)      Do you currently have service(s) that help you manage your care at home? No (P)      Do you have prescription coverage? Yes (P)      Coverage Medicare A and B, Medicaid (P)      Who is going to help you get home at discharge? ambulance transport (P)      How do you get to doctors appointments? other (see comments) (P)    facility transportation    Are you on dialysis? No (P)      Do you take coumadin? No (P)      Discharge Plan A Return to nursing home (P)      Discharge Plan B Return to Nursing Home (P)      DME Needed Upon Discharge  none (P)      Discharge Plan discussed with: Adult children (P)      Transition of Care Barriers None (P)         OTHER    Name(s) of People in Home [facility] staff (P)

## 2024-04-26 NOTE — PROGRESS NOTES
Pharmacokinetic Assessment Follow Up: IV Vancomycin    Vancomycin serum concentration assessment(s):    The random level was drawn 2 hours before schedule time. The measurement is above desired definitive target range of 10-15.    Vancomycin Regimen Plan:    Re-dose when the random level is less than 15 mcg/mL, next level to be drawn at 0430 with AM labs on 4/27    Drug levels (last 3 results):  Recent Labs   Lab Result Units 04/26/24  1124   Vancomycin, Random ug/mL 16.8       Pharmacy will continue to follow and monitor vancomycin.    Please contact pharmacy at extension [phone] for questions regarding this assessment.    Thank you for the consult,   Varun uQintero       Patient brief summary:  Steff Johnson is a 64 y.o. female initiated on antimicrobial therapy with IV Vancomycin for treatment of urinary tract infection    The patient's current regimen is intermittent dosing based on random levels    Drug Allergies:   Review of patient's allergies indicates:  No Known Allergies    Actual Body Weight:   53.5 kg    Renal Function:   Estimated Creatinine Clearance: 13.8 mL/min (A) (based on SCr of 3.1 mg/dL (H)).,     Dialysis Method (if applicable):  N/A    CBC (last 72 hours):  Recent Labs   Lab Result Units 04/25/24  1223 04/25/24  1701 04/25/24  1854 04/26/24  0255 04/26/24  0405 04/26/24  0608 04/26/24  1124   WBC K/uL 28.38* 24.95*  --   --  24.03*  --   --    Hemoglobin g/dL 8.8* 7.3* 6.2* 9.7* 9.4* 10.0* 9.7*   Hematocrit % 27.0* 22.3*  --   --  27.9*  --   --    Platelets K/uL 818* 560*  --   --  482*  --   --    Gran % % 55.0 83.0*  --   --  86.8*  --   --    Lymph % % 8.0* 9.3*  --   --  5.7*  --   --    Mono % % 5.0 5.8  --   --  4.7  --   --    Eosinophil % % 0.0 0.6  --   --  0.5  --   --    Basophil % % 0.0 0.2  --   --  0.4  --   --    Differential Method  Manual Automated  --   --  Automated  --   --        Metabolic Panel (last 72 hours):  Recent Labs   Lab Result Units 04/25/24  1223 04/25/24  1435  04/25/24  2146 04/26/24  0405   Sodium mmol/L 133*  --  135* 136   Potassium mmol/L 5.9*  --  4.1 4.3   Chloride mmol/L 96  --  104 101   CO2 mmol/L 18*  --  11* 20*   Glucose mg/dL 210*  --  302* 241*   Glucose, UA   --  Negative  --   --    BUN mg/dL 93*  --  84* 96*   Creatinine mg/dL 2.5*  --  2.7* 3.1*   Albumin g/dL 3.3*  --   --  2.7*   Total Bilirubin mg/dL 0.6  --   --  0.9   Alkaline Phosphatase U/L 89  --   --  68   AST U/L 18  --   --  19   ALT U/L 15  --   --  10   Magnesium mg/dL 2.2  --   --  2.0   Phosphorus mg/dL 3.8  --   --   --        Vancomycin Administrations:  vancomycin given in the last 96 hours                     vancomycin 125 mg/5 mL oral solution 125 mg (mg) 125 mg Given 04/26/24 1334     125 mg Given  0617    vancomycin 125 mg/5 mL oral solution 500 mg (mg) 500 mg Given 04/25/24 1705    vancomycin in dextrose 5 % 1 gram/250 mL IVPB 1,000 mg (mg) 1,000 mg New Bag 04/25/24 1432                    Microbiologic Results:  Microbiology Results (last 7 days)       Procedure Component Value Units Date/Time    Urine culture [[id number]]  (Abnormal) Collected: 04/25/24 1434    Order Status: Completed Specimen: Urine Updated: 04/26/24 0923     Urine Culture, Routine GRAM NEGATIVE MARLEEN, LACTOSE   >100,000 cfu/ml  Identification and susceptibility pending        GRAM NEGATIVE MARLEEN, NON-LACTOSE   > 100,000 cfu/ml  Identification and susceptibility pending      Narrative:      Specimen Source->Urine    Rapid Organism ID by PCR (from Blood culture) [[id number]]  (Abnormal) Collected: 04/25/24 1247    Order Status: Completed Updated: 04/26/24 0826     Enterococcus faecalis Not Detected     Enterococcus faecium Not Detected     Listeria monocytogenes Not Detected     Staphylococcus spp. Detected     Staphylococcus aureus Not Detected     Staphylococcus epidermidis Not Detected     Staphylococcus lugdunensis Not Detected     Streptococcus species Not Detected     Streptococcus agalactiae  Not Detected     Streptococcus pneumoniae Not Detected     Streptococcus pyogenes Not Detected     Acinetobacter calcoaceticus/baumannii complex Not Detected     Bacteroides fragilis Not Detected     Enterobacterales Not Detected     Enterobacter cloacae complex Not Detected     Escherichia coli Not Detected     Klebsiella aerogenes Not Detected     Klebsiella oxytoca Not Detected     Klebsiella pneumoniae group Not Detected     Proteus Not Detected     Salmonella sp Not Detected     Serratia marcescens Not Detected     Haemophilus influenzae Not Detected     Neisseria meningtidis Not Detected     Pseudomonas aeruginosa Not Detected     Stenotrophomonas maltophilia Not Detected     Candida albicans Not Detected     Candida auris Not Detected     Candida glabrata Not Detected     Candida krusei Not Detected     Candida parapsilosis Not Detected     Candida tropicalis Not Detected     Cryptococcus neoformans/gattii Not Detected     CTX-M (ESBL ) Test not applicable     IMP (Carbapenem resistant) Test not applicable     KPC resistance gene (Carbapenem resistant) Test not applicable     mcr-1  Test not applicable     mec A/C  Test not applicable     mec A/C and MREJ (MRSA) gene Test not applicable     NDM (Carbapenem resistant) Test not applicable     OXA-48-like (Carbapenem resistant) Test not applicable     van A/B (VRE gene) Test not applicable     VIM (Carbapenem resistant) Test not applicable    Narrative:      Aerobic and anaerobic    Blood culture x two cultures. Draw prior to antibiotics. [[id number]] Collected: 04/25/24 1223    Order Status: Completed Specimen: Blood Updated: 04/26/24 0756     Blood Culture, Routine Gram stain aer bottle: Gram positive cocci      Results called to and read back by:Octavio Rodriguez RN-ED;  04/26/2024      07:56 CJD    Narrative:      Aerobic and anaerobic    Blood culture x two cultures. Draw prior to antibiotics. [[id number]] Collected: 04/25/24 1247    Order Status:  Completed Specimen: Blood Updated: 04/26/24 0736     Blood Culture, Routine Gram stain aer bottle: Gram positive cocci      Results called to and read back by:Octavio Rodriguez RN-ED;  04/26/2024      07:35 CJD    Narrative:      Aerobic and anaerobic    C Diff Toxin by PCR [[id number]] Collected: 04/25/24 1526    Order Status: Completed Updated: 04/25/24 2104     C. diff PCR Negative    Clostridium difficile EIA [[id number]]  (Abnormal) Collected: 04/25/24 1526    Order Status: Completed Specimen: Stool Updated: 04/25/24 1624     C. diff Antigen Positive     C difficile Toxins A+B, EIA Negative     Comment: Testing not recommended for children <24 months old.       Stool culture **cannot be ordered stat** [[id number]] Collected: 04/25/24 1526    Order Status: Sent Specimen: Stool Updated: 04/25/24 5202

## 2024-04-26 NOTE — NURSING
Pt transferred to step down unit from ED. Pt arrived via bed and X 2 staff members. Pt tracks with eyes but is non-verbal due to past cva. Wounds noted to sacrum, inner left calf, and inner right foot. Photos taken, mepliex applied, and wound care consulted. NG tube hooked up to low intermittent suction per attending MD who is at bedside. Bed in lowest position and locked. Side rails up x2. Bed alarm activated. Safety maintained.         ...Nurses Note -- 4 Eyes      4/26/2024   11:00 AM      Skin assessed during: Admit      [] No Altered Skin Integrity Present    []Prevention Measures Documented      [x] Yes- Altered Skin Integrity Present or Discovered   [] LDA Added if Not in Epic (Describe Wound)   [] New Altered Skin Integrity was Present on Admit and Documented in LDA   [x] Wound Image Taken    Wound Care Consulted? Yes    Attending Nurse:  Miracle Callahan RN    Second RN/Staff Member:   Jorden Rosenthal RN

## 2024-04-26 NOTE — PROGRESS NOTES
Hgb dropped to 6.2. verbal consent obtained from daughter and 2 units were ordered to be transfused. GI was contacted. Protonix iv given. Gi is planning for egd tomorrow.    Pt with severe acidosis, elevated anion gap, also worsening renal failure. Will start bicarb ggt. Nephro has been consulted.     No ketones in urine and beta hydroxybutyric acid is negative. Doubt DKA. Will inc lantus to 15 units qhs and do sliding scale q4h.

## 2024-04-26 NOTE — PROGRESS NOTES
Pharmacy Consult Discontinuation: Vancomycin    Steff Johnson [id number] is a 64 y.o. female was consulted for vancomycin pharmacotherapy management by pharmacy.    Pharmacy consult for vancomycin dosing is no longer required.  Vancomycin was discontinued 04/26/2024 @3926.    Thank you for allowing us to participate in this patient's care. Should you have any questions or concerns please feel free to contact the pharmacy department at [phone].    Varun Quintero, PharmD

## 2024-04-26 NOTE — ANESTHESIA PREPROCEDURE EVALUATION
04/26/2024  Steff Johnson is a 64 y.o., female.        Results for orders placed or performed during the hospital encounter of 04/25/24   EKG 12-lead    Collection Time: 04/25/24 12:25 PM   Result Value Ref Range    QRS Duration 70 ms    [facility]HS QTC Calculation 454 ms    Narrative    Test Reason : A41.9,    Vent. Rate : 104 BPM     Atrial Rate : 104 BPM     P-R Int : 138 ms          QRS Dur : 070 ms      QT Int : 346 ms       P-R-T Axes : 056 029 078 degrees     QTc Int : 454 ms    Sinus tachycardia  Cannot rule out Anterior infarct ,age undetermined  Abnormal ECG  When compared with ECG of 23-FEB-2024 14:49,  Criteria for Inferior infarct are no longer Present  Confirmed by Marjan PALOMO, Blade NICHOLE ([id number]) on 4/25/2024 8:15:12 PM    Referred By: OSORIOERR   SELF           Confirmed By:Blade Phillip MD        Imaging Results              X-Ray Chest AP Portable (Final result)  Result time 04/25/24 18:15:53      Final result by Bud Pierre MD (04/25/24 18:15:53)                   Impression:      No significant interval detrimental change in the radiographic appearance of the chest as compared with the prior exam on the same date, 04/25/2024.  Nasogastric tube in place.      Electronically signed by: Bud Pierre  Date:    04/25/2024  Time:    18:15               Narrative:    EXAMINATION:  XR CHEST AP PORTABLE    CLINICAL HISTORY:  . Chest pain, unspecified    TECHNIQUE:  Single frontal portable view of the chest was performed.    COMPARISON:  04/25/2024    FINDINGS:  Enteric tube partially image and courses below the diaphragm towards the left upper quadrant with tip projected over the right upper quadrant.  Cardiac monitor wires overlie the chest.   Cardiomediastinal silhouette is within normal limits.Persistent thin bandlike opacities in the right mid and lower lung which may reflect subsegmental atelectasis,  pneumonia or edema.  Otherwise, lungs appear grossly clear without significant detrimental change as compared to the prior exam.  No pneumothorax.  Bones are intact.                                       CT Chest Abdomen Pelvis Without Contrast (XPD) (Final result)  Result time 04/25/24 16:10:59   Procedure changed from CT Abdomen Pelvis  Without Contrast     Final result by Burke Bermudez MD (04/25/24 16:10:59)                   Impression:      1. Scattered nonspecific bronchiolitis in both lungs, with right lower lobes opacities potentially reflecting chronic bronchopneumonia, given similar appearance to CT of 12/11/2023.  2. Additional stable observations as described.      Electronically signed by: Burke Bermudez  Date:    04/25/2024  Time:    16:10               Narrative:    EXAMINATION:  CT CHEST ABDOMEN PELVIS WITHOUT CONTRAST(XPD)    CLINICAL HISTORY:  Nausea/vomiting;    TECHNIQUE:  Axial imaging through the chest, abdomen and pelvis was performed without IV contrast.  Lack of IV contrast limits evaluation of the mediastinum, vasculature, solid organs and hollow viscera.    COMPARISON:  Multiple prior exams.    FINDINGS:  CT CHEST: A nasogastric tube is present.  The unenhanced heart and great vessels are unremarkable, with no mediastinal or hilar lymph node enlargement.  No pericardial effusion.    There are scattered right lung centrilobular nodular opacities and ground-glass densities, with patchy airspace opacities in the right lower lobe, and minimal ground-glass and centrilobular nodular opacities at the left lung base.  No pleural effusion, evidence of pulmonary edema, or pneumothorax.  The central airways are patent.    The unenhanced chest wall soft tissues are unremarkable.  There are no acute fractures or destructive osseous lesions.    CT ABDOMEN: The unenhanced liver is unremarkable, with nonspecific hypodensities in the gallbladder fossa similar to prior CT.  Mass involving the lateral spleen  is grossly unchanged, with the unenhanced pancreas, adrenal glands and kidneys unremarkable.  No renal calculi or hydronephrosis.  The abdominal aorta is normal in caliber.    Distal portion of nasogastric tube lies in the stomach, with distal tip in the pyloric region.  Percutaneous gastrostomy tube distal tip and retention balloon lie in the gastric body.  There is no bowel obstruction, ascites, or intraperitoneal free air.  The appendix is normal.    CT PELVIS: There is a rectal tube and retention balloon, with Mathur catheter and retention balloon in the urinary bladder.  The unenhanced sigmoid colon and rectum are unremarkable, with several calcified pelvic phleboliths.  No distal ureteral or bladder calculi, or pelvic free fluid.    There are prominent calcifications about the right and left anterior iliac spines, as well as about the right hip joint, with no acute fractures or destructive osseous lesions.                                       X-Ray Chest AP Portable (Final result)  Result time 04/25/24 15:04:39      Final result by Burke Bermudez MD (04/25/24 15:04:39)                   Impression:      Interval insertion of a nasogastric tube as described.      Electronically signed by: Burke Bermudez  Date:    04/25/2024  Time:    15:04               Narrative:    EXAMINATION:  XR CHEST AP PORTABLE    CLINICAL HISTORY:  Encounter for other specified special examinations    FINDINGS:  Two portable chest radiographs at 14:41 hours compared to 12:58 hours show interval insertion of a nasogastric tube, with distal portion overlying the gastric body.  No other significant interval change.                                       X-Ray Chest AP Portable (Final result)  Result time 04/25/24 13:23:25      Final result by Serg Brambila MD (04/25/24 13:23:25)                   Impression:      Mild faint nonspecific infrahilar interstitial opacity, new from the previous exam suggesting very mild  aspiration/bronchitis/pneumonia or trace edema in the appropriate clinical setting.      Electronically signed by: Serg Brambila  Date:    04/25/2024  Time:    13:23               Narrative:    CLINICAL HISTORY:  ([id number] y/o  (1960) F    Shortness of breath    TECHNIQUE:  (A#[id number], exam time 4/25/2024 13:21)    XR CHEST AP PORTABLE [id number]    COMPARISON:  Radiograph from 02/27/2024.    FINDINGS:  There are faint linear opacities at the lung bases suggestive of atelectasis/scarring  aspiration/pneumonia or trace edema in the appropriate clinical setting. Costophrenic angles are seen without effusion. No pneumothorax is identified. The heart is normal in size. The mediastinum is within normal limits. Osseous structures show slight dextrocurvature of the thoracic spine.  The visualized upper abdomen is unremarkable.                                       Lab Results   Component Value Date    WBC 24.03 (H) 04/26/2024    HGB 9.7 (L) 04/26/2024    HCT 27.9 (L) 04/26/2024    MCV 83 04/26/2024     (H) 04/26/2024     BMP  Lab Results   Component Value Date     04/26/2024    K 4.3 04/26/2024     04/26/2024    CO2 20 (L) 04/26/2024    BUN 96 (H) 04/26/2024    CREATININE 3.1 (H) 04/26/2024    CALCIUM 8.3 (L) 04/26/2024    ANIONGAP 15 04/26/2024     (H) 04/26/2024     (H) 04/25/2024     (H) 04/25/2024       Results for orders placed during the hospital encounter of 12/05/23    Echo    Interpretation Summary    Left Ventricle: The left ventricle is normal in size. Normal wall thickness. Normal wall motion. There is normal systolic function with a visually estimated ejection fraction of 60 - 65%. There is normal diastolic function.    Right Ventricle: Normal right ventricular cavity size. Wall thickness is normal. Right ventricle wall motion  is normal. Systolic function is normal.    IVC/SVC: Normal venous pressure at 3 mmHg.       Pre-op Assessment    I have reviewed the  Patient Summary Reports.     I have reviewed the Nursing Notes. I have reviewed the NPO Status.   I have reviewed the Medications.     Review of Systems  Anesthesia Hx:  No problems with previous Anesthesia   Neg history of prior surgery.          Denies Family Hx of Anesthesia complications.    Denies Personal Hx of Anesthesia complications.                    Social:  Non-Smoker, No Alcohol Use       Hematology/Oncology:    Oncology Normal    -- Anemia:  acute                                 EENT/Dental:   Ocular hypertension, bilateral Eyes: Visual Impairment    Catarract        Eye Disease:  Diabetic Retinopathy                   Cardiovascular:     Hypertension, well controlled              ECG has been reviewed.                          Pulmonary:  Pneumonia COPD      Scattered nonspecific bronchiolitis               Renal/:  Chronic Renal Disease, CKD, ARF        Kidney Function/Disease, Chronic Kidney Disease (CKD) , CKD Stage III (GFR 30-59)            Hepatic/GI:  Hepatic/GI Normal         Esophageal / Stomach Disorders Gastric Conditions:   Bowel Conditions:  GI Bleed, active GI bleed        Musculoskeletal:  Arthritis               Neurological:   CVA (non verbal), residual symptoms Neuromuscular Disease, (CNV III palsy)   Seizures    H/o Cerebrovascular accident (CVA) of left pontine structure  Right sided weakness  Aphasia  Functional quadriplegia                            Endocrine:  Diabetes, poorly controlled, type 2, using insulin           Dermatological:  Skin Normal    Psych:  Psychiatric Normal                         Physical Exam  General: Well nourished and Alert    Airway:  Mallampati: II   Mouth Opening: Normal  TM Distance: Normal  Tongue: Normal  Neck ROM: Normal ROM    Dental:  Intact    Chest/Lungs:  Clear to auscultation, Normal Respiratory Rate    Heart:  Rate: Normal  Rhythm: Regular Rhythm  Sounds: Normal        Anesthesia Plan  Type of Anesthesia, risks & benefits  discussed:    Anesthesia Type: Gen Natural Airway  Intra-op Monitoring Plan: Standard ASA Monitors  Post Op Pain Control Plan:   (medical reason for not using multimodal pain management)  Induction:  IV  Informed Consent: Informed consent signed with the Patient and all parties understand the risks and agree with anesthesia plan.  All questions answered. Patient consented to blood products? Yes  ASA Score: 4 Emergent  Anesthesia Plan Notes:       GNA  No IV Lidocaine  POM  Propofol     Ready For Surgery From Anesthesia Perspective.     .

## 2024-04-26 NOTE — ANESTHESIA POSTPROCEDURE EVALUATION
Anesthesia Post Evaluation    Patient: Steff Johnson    Procedure(s) Performed: Procedure(s) (LRB):  EGD (ESOPHAGOGASTRODUODENOSCOPY) (N/A)    Final Anesthesia Type: general      Patient location during evaluation: GI PACU  Note status: Aphasia.  Post-procedure mental status: Same as prior to procedure.  Post-procedure vital signs: reviewed and stable  Pain management: adequate  Airway patency: patent    PONV status at discharge: No PONV  Anesthetic complications: no      Cardiovascular status: blood pressure returned to baseline, hemodynamically stable and stable  Respiratory status: unassisted, spontaneous ventilation and nasal cannula  Hydration status: euvolemic  Follow-up not needed.              Vitals Value Taken Time   /72 04/26/24 1619   Temp 97.8 04/26/24 1619   Pulse 100 04/26/24 1619   Resp 20 04/26/24 1619   SpO2 99 % 04/26/24 1619         Event Time   Out of Recovery 04/26/2024 16:17:59         Pain/Estefania Score: Pain Rating Prior to Med Admin: -- (pt nonverbal) (4/25/2024  2:31 PM)  Pain Rating Post Med Admin: 0 (4/25/2024  3:27 PM)

## 2024-04-26 NOTE — PROVATION PATIENT INSTRUCTIONS
Discharge Summary/Instructions after an Endoscopic Procedure  Patient Name: Steff Johnson  Patient MRN: [id number]  Patient YOB: 1960 Friday, April 26, 2024  Julien Escobar III, MD  RESTRICTIONS:  During your procedure today, you received medications for sedation.  These   medications may affect your judgment, balance and coordination.  Therefore,   for 24 hours, you have the following restrictions:   - DO NOT drive a car, operate machinery, make legal/financial decisions,   sign important papers or drink alcohol.    ACTIVITY:  Today: no heavy lifting, straining or running due to procedural   sedation/anesthesia.  The following day: return to full activity including work.  DIET:  Eat and drink normally unless instructed otherwise.     TREATMENT FOR COMMON SIDE EFFECTS:  - Mild abdominal pain, nausea, belching, bloating or excessive gas:  rest,   eat lightly and use a heating pad.  - Sore Throat: treat with throat lozenges and/or gargle with warm salt   water.  - Because air was used during the procedure, expelling large amounts of air   from your rectum or belching is normal.  - If a bowel prep was taken, you may not have a bowel movement for 1-3 days.    This is normal.  SYMPTOMS TO WATCH FOR AND REPORT TO YOUR PHYSICIAN:  1. Abdominal pain or bloating, other than gas cramps.  2. Chest pain.  3. Back pain.  4. Signs of infection such as: chills or fever occurring within 24 hours   after the procedure.  5. Rectal bleeding, which would show as bright red, maroon, or black stools.   (A tablespoon of blood from the rectum is not serious, especially if   hemorrhoids are present.)  6. Vomiting.  7. Weakness or dizziness.  GO DIRECTLY TO THE NEAREST EMERGENCY ROOM IF YOU HAVE ANY OF THE FOLLOWING:      Difficulty breathing              Chills and/or fever over 101 F   Persistent vomiting and/or vomiting blood   Severe abdominal pain   Severe chest pain   Black, tarry stools   Bleeding- more than one  tablespoon   Any other symptom or condition that you feel may need urgent attention  Your doctor recommends these additional instructions:  If any biopsies were taken, your doctors clinic will contact you in 1 to 2   weeks with any results.  - Return patient to [facility] for ongoing care.   - PPI daily indefinitely; ok to resume anticoagulation.  For questions, problems or results please call your physician - Julien Escobar III, MD at Work:  [phone].  [facility], EMERGENCY ROOM PHONE NUMBER: [phone]  IF A COMPLICATION OR EMERGENCY SITUATION ARISES AND YOU ARE UNABLE TO REACH   YOUR PHYSICIAN - GO DIRECTLY TO THE EMERGENCY ROOM.  Julien Escobar III, MD  4/26/2024 3:43:55 PM  This report has been verified and signed electronically.  Dear patient,  As a result of recent federal legislation (The Federal Cures Act), you may   receive lab or pathology results from your procedure in your [facility]   account before your physician is able to contact you. Your physician or   their representative will relay the results to you with their   recommendations at their soonest availability.  Thank you,  PROVATION

## 2024-04-26 NOTE — CONSULTS
GASTROENTEROLOGY INPATIENT CONSULT NOTE  Patient Name: Steff Johnson  Patient MRN: [id number]  Patient : 1960    Admit Date: 2024  Service date: 2024    Reason for Consult: GIB / C. diff    PCP: Cristina Ren MD    Chief Complaint   Patient presents with    GI Problem     Brown fluid being withdrawn from peg tube, vomiting brown fluid. Smells of feces. Since this morning. Pt is nonverbal       HPI: Patient is a 64 y.o. female with PMHx COPD, h/o CVA w/ aphasia (ASA/Eliquis), hiatal hernia, chronic anemia, HTN, hysterectomy, DM, PEG, duod AVM who presents from NH due to coffee ground emesis and dark fluid from around PEG tube. COURTNEY and C.diff noted on admission.     CHART REVIEW:   Hg 6.2 (6-9ish on all past drawas over past year); good respone to pRBCs  C. Diff +   CT Ch/Abd  - chronic lung dz; Nml liver / panc; ?alla-splenic mass?; PEG tube in place; otherwise nml bowels  Egd  - PEG in place; duodenal bulb APC s/p coag  Esophagram  - deep penetration of barium  Colon ' - small polyps; nml TI / colon; medium roids    Past Medical History:  Past Medical History:   Diagnosis Date    Arthritis     Complete tear of left rotator cuff 2017    COPD (chronic obstructive pulmonary disease)     COVID-19 infection 2021    Diabetes mellitus     H/o Cerebrovascular accident (CVA) of left pontine structure 2019    Hypertension     Personal history of colonic polyps     Stroke     Wears glasses         Past Surgical History:  Past Surgical History:   Procedure Laterality Date    COLONOSCOPY N/A 2017    Procedure: COLONOSCOPY;  Surgeon: Jared Antonio MD;  Location: [facility] ENDO;  Service: Endoscopy;  Laterality: N/A;    CYSTOSCOPY W/ RETROGRADES N/A 2023    Procedure: CYSTOSCOPY, WITH RETROGRADE PYELOGRAM;  Surgeon: Sergio Soria MD;  Location: [facility] OR;  Service: Urology;  Laterality: N/A;    ECHOCARDIOGRAM,TRANSESOPHAGEAL N/A 2023    Procedure:  Transesophageal echo (GT) intra-procedure log documentation;  Surgeon: Antoine Rivera MD;  Location: [facility] CATH/EP LAB;  Service: Cardiology;  Laterality: N/A;    ESOPHAGOGASTRODUODENOSCOPY N/A 2/25/2024    Procedure: EGD (ESOPHAGOGASTRODUODENOSCOPY);  Surgeon: Alexandru May MD;  Location: [facility] ENDO;  Service: Endoscopy;  Laterality: N/A;    HYSTERECTOMY      INSERTION OF CATHETER N/A 12/14/2023    Procedure: INSERTION, CATHETER;  Surgeon: Sergio Soria MD;  Location: [facility] OR;  Service: Urology;  Laterality: N/A;    OOPHORECTOMY      SHOULDER SURGERY      R    TRANSESOPHAGEAL ECHOCARDIOGRAM WITH POSSIBLE CARDIOVERSION (GT W/ POSS CARDIOVERSION) N/A 5/4/2023    Procedure: Transesophageal echo (GT) intra-procedure log documentation;  Surgeon: Blade Phillip MD;  Location: [facility] CATH/EP LAB;  Service: Cardiology;  Laterality: N/A;        Home Medications:  Medications Prior to Admission   Medication Sig Dispense Refill Last Dose    allopurinoL (ZYLOPRIM) 100 MG tablet 100 mg by Per G Tube route once daily.   4/25/2024 at 08:00    amLODIPine (NORVASC) 10 MG tablet 1 tablet (10 mg total) by Per G Tube route once daily.   4/25/2024 at 05:00    apixaban (ELIQUIS) 2.5 mg Tab 2.5 mg by Per G Tube route 2 (two) times daily.   4/25/2024 at 05:00    ascorbic acid, vitamin C, (VITAMIN C) 500 MG tablet 500 mg by Per G Tube route 2 (two) times daily.   4/25/2024 at 05:00    atorvastatin (LIPITOR) 80 MG tablet 1 tablet (80 mg total) by Per G Tube route every evening. 30 tablet 0 4/24/2024 at 17:00    folic acid (FOLVITE) 1 MG tablet 1 tablet by Per G Tube route every morning.   4/25/2024 at 05:00    furosemide (LASIX) 40 MG tablet 40 mg by Per G Tube route once daily.   4/25/2024 at 05:00    HUMALOG KWIKPEN INSULIN 100 unit/mL pen Inject 0-20 Units into the skin As instructed (inject 4 times daily per sliding scale). 0 -149 = 0 units  150 - 199 = 4 units  200 - 249 = 8 units  250 - 299 = 12 units  300 - 349 = 16  units  350 - 1000 = 20 units Call MD   4/25/2024    insulin (LANTUS SOLOSTAR U-100 INSULIN) glargine 100 units/mL SubQ pen Inject 35 Units into the skin once daily.   4/25/2024 at 06:00    insulin lispro 100 unit/mL injection Inject 2 Units into the skin 3 (three) times daily before meals. 07:30  11:30  16:30   4/25/2024 at 07:30    latanoprost 0.005 % ophthalmic solution Place 1 drop into both eyes once daily. (Patient taking differently: Place 1 drop into both eyes every evening.) 7.5 mL 6 4/24/2024 at 17:00    levETIRAcetam (KEPPRA) 100 mg/mL Soln 500 mg by Per G Tube route 2 (two) times daily.   4/25/2024 at 05:00    losartan (COZAAR) 100 MG tablet 1 tablet (100 mg total) by Per G Tube route once daily.   4/25/2024 at 05:00    magnesium oxide (MAG-OX) 400 mg (241.3 mg magnesium) tablet 400 mg by Per G Tube route once daily.   4/25/2024 at 05:00    metFORMIN (GLUCOPHAGE) 1000 MG tablet Take 1,000 mg by mouth 2 (two) times daily.   4/25/2024 at 05:00    metoclopramide HCl (REGLAN) 5 mg/5 mL Soln 10 mg by Per G Tube route 3 (three) times daily.   4/25/2024 at 08:00    metoprolol tartrate (LOPRESSOR) 25 MG tablet 25 mg by Per G Tube route 2 (two) times daily.   4/25/2024 at 05:00    multivitamin (THERAGRAN) per tablet 1 tablet by Per G Tube route once daily.   4/25/2024 at 05:00    omeprazole (PRILOSEC) 20 MG capsule Take 20 mg by mouth once daily. Per G-Tube   4/24/2024 at 09:00    polyethylene glycol (GLYCOLAX) 17 gram PwPk 17 g by Per G Tube route once daily.   4/25/2024 at 05:00    thiamine (VITAMIN B-1) 100 MG tablet 100 mg by Per G Tube route once daily.   4/25/2024 at 05:00    zinc sulfate (ZINC-220 ORAL) 1 tablet by PEG Tube route Daily.   4/25/2024 at 05:00    aspirin 81 MG Chew 1 tablet (81 mg total) by Per G Tube route once daily.       baclofen (LIORESAL) 5 mg Tab tablet 5 mg by Per G Tube route every 8 (eight) hours as needed (muscle spasms).   Unknown    cloNIDine (CATAPRES) 0.1 MG tablet 1 tablet (0.1  mg total) by Per G Tube route 3 (three) times daily. 90 tablet 11 Unknown    tiotropium (SPIRIVA) 18 mcg inhalation capsule Inhale 1 capsule (18 mcg total) into the lungs once daily. Controller 30 capsule 0        Inpatient Medications:  Current Facility-Administered Medications   Medication Dose Route Frequency    insulin aspart U-100  0-5 Units Subcutaneous Q4H    insulin detemir U-100  15 Units Subcutaneous QHS    levETIRAcetam (Keppra) IV (PEDS and ADULTS)  500 mg Intravenous Q12H    meropenem IV (PEDS and ADULTS)  500 mg Intravenous Q12H    mupirocin   Nasal BID    vancomycin  125 mg Per G Tube Q6H       Current Facility-Administered Medications:     0.9%  NaCl infusion (for blood administration), , Intravenous, Q24H PRN    acetaminophen, 650 mg, Oral, Q4H PRN    aluminum-magnesium hydroxide-simethicone, 30 mL, Oral, QID PRN    [COMPLETED] calcium gluconate IVPB, 1 g, Intravenous, ED 1 Time **AND** calcium gluconate IVPB, 1 g, Intravenous, Q10 Min PRN    cloNIDine, 0.1 mg, Oral, Q8H PRN    dextrose 50%, 12.5 g, Intravenous, PRN    [COMPLETED] dextrose 50%, 50 g, Intravenous, Once **AND** dextrose 50%, 25 g, Intravenous, PRN **AND** [COMPLETED] insulin regular, 0.1 Units/kg, Intravenous, Once    dextrose 50%, 25 g, Intravenous, PRN    glucagon (human recombinant), 1 mg, Intramuscular, PRN    glucose, 16 g, Oral, PRN    glucose, 24 g, Oral, PRN    HYDROcodone-acetaminophen, 1 tablet, Oral, Q6H PRN    magnesium oxide, 800 mg, Oral, PRN    magnesium oxide, 800 mg, Oral, PRN    melatonin, 6 mg, Oral, Nightly PRN    naloxone, 0.02 mg, Intravenous, PRN    ondansetron, 4 mg, Intravenous, Q6H PRN    potassium bicarbonate, 35 mEq, Oral, PRN    potassium bicarbonate, 50 mEq, Oral, PRN    potassium bicarbonate, 60 mEq, Oral, PRN    potassium, sodium phosphates, 2 packet, Oral, PRN    potassium, sodium phosphates, 2 packet, Oral, PRN    potassium, sodium phosphates, 2 packet, Oral, PRN    sodium chloride 0.9%, 10 mL,  "Intravenous, Q12H PRN    Review of patient's allergies indicates:  No Known Allergies    Social History:   Social History     Occupational History     Comment: disabled due to shoulder problems   Tobacco Use    Smoking status: Never    Smokeless tobacco: Never   Substance and Sexual Activity    Alcohol use: No    Drug use: No    Sexual activity: Not Currently       Family History:   Family History   Problem Relation Name Age of Onset    Diabetes Mother      Asthma Mother      Hypertension Mother      Diabetes Father      Diabetes Brother      Hypertension Brother      Anemia Daughter      Glaucoma Neg Hx      Macular degeneration Neg Hx      Retinal detachment Neg Hx         Review of Systems:  A 10 point review of systems was performed and was normal, except as mentioned in the HPI, including constitutional, HEENT, heme, lymph, cardiovascular, respiratory, gastrointestinal, genitourinary, neurologic, endocrine, psychiatric and musculoskeletal.      OBJECTIVE:    Physical Exam:  24 Hour Vital Sign Ranges: Temp:  [98.4 °F (36.9 °C)-100 °F (37.8 °C)] 100 °F (37.8 °C)  Pulse:  [] 100  Resp:  [20-35] 24  SpO2:  [94 %-100 %] 100 %  BP: (109-159)/(51-75) 122/58  Most recent vitals: BP (!) 122/58   Pulse 100   Temp 100 °F (37.8 °C)   Resp (!) 24   Ht 4' 11" (1.499 m)   Wt 53.5 kg (118 lb)   SpO2 100%   BMI 23.83 kg/m²    GEN: non-verbal BF; NAD  HEENT: PERRL, sclera anicteric, oral mucosa pink and moist without lesion  NECK: trachea midline; Good ROM  CV: regular rate and rhythm, no murmurs or gallops  RESP: clear to auscultation bilaterally, no wheezes, rhonci or rales  ABD: soft, non-tender, non-distended, hypoactive bowel sounds; PEG in place  EXT: no swelling or edema, 1+ pulses distally  SKIN: no rashes or jaundice  PSYCH: n/a    Labs:   Recent Labs     04/25/24  1223 04/25/24  1701 04/26/24  0405   WBC 28.38* 24.95* 24.03*   MCV 79* 81* 83   * 560* 482*     Recent Labs     04/25/24  1223 " "04/25/24  2146 04/26/24  0405   * 135* 136   K 5.9* 4.1 4.3   CL 96 104 101   CO2 18* 11* 20*   BUN 93* 84* 96*   * 302* 241*     No results for input(s): "ALB" in the last 72 hours.    Invalid input(s): "ALKP", "SGOT", "SGPT", "TBIL", "DBIL", "TPRO"  No results for input(s): "PT", "INR", "PTT" in the last 72 hours.      Radiology Review:  X-Ray Chest AP Portable   Final Result      Mild hazy opacification of the right lung base with blunting of the costophrenic angle.  This could reflect atelectasis, infiltrate, pleural effusion or combination there of.         Electronically signed by: Messi Hollingsworth   Date:    04/26/2024   Time:    11:28      X-Ray Chest AP Portable   Final Result      No significant interval detrimental change in the radiographic appearance of the chest as compared with the prior exam on the same date, 04/25/2024.  Nasogastric tube in place.         Electronically signed by: Bud Pierre   Date:    04/25/2024   Time:    18:15      CT Chest Abdomen Pelvis Without Contrast (XPD)   Final Result      1. Scattered nonspecific bronchiolitis in both lungs, with right lower lobes opacities potentially reflecting chronic bronchopneumonia, given similar appearance to CT of 12/11/2023.   2. Additional stable observations as described.         Electronically signed by: Burke Bermudez   Date:    04/25/2024   Time:    16:10      X-Ray Chest AP Portable   Final Result      Interval insertion of a nasogastric tube as described.         Electronically signed by: Burke Bermudez   Date:    04/25/2024   Time:    15:04      X-Ray Chest AP Portable   Final Result      Mild faint nonspecific infrahilar interstitial opacity, new from the previous exam suggesting very mild aspiration/bronchitis/pneumonia or trace edema in the appropriate clinical setting.         Electronically signed by: Serg Brambila   Date:    04/25/2024   Time:    13:23            IMPRESSION / RECOMMENDATIONS:  64 y.o. female with PMHx COPD, " h/o CVA w/ aphasia (Eliquis), hiatal hernia, chronic anemia, HTN, hysterectomy, DM, PEG, duod AVM who presents from NH due to coffee ground emesis and dark fluid from around PEG tube w/ acute/chronic anemia. RIsks, benefits, alternatives discussed in detail regarding upcoming procedures and sedation and possible complications. Some of the more common endoscopic complications include but not limited to immediate or delayed perforation, bleeding, infections, pain, inadvertent injury to surrounding tissue / organs and possible need for surgical evaluation. Limited comprehension by patient and attempts to contact POA w/o success. Will proceed w/ emergent dual physician consent given concerns for GIB.     -EGD today.   -PPI  -Agree w/ Vanc for C.diff  -Long term probiotics  -Minimize other abx unless necessary in setting of C. diff    Thank you for this consult.    Julien Escobar III  4/26/2024  3:11 PM

## 2024-04-26 NOTE — ED NOTES
Attempt to call report [phone], was told sangita was still in the room. Asked to give report to nurse was told it will be Miracle, she is in another room will call back when she is finished.

## 2024-04-27 PROBLEM — G82.50 QUADRIPLEGIA: Chronic | Status: ACTIVE | Noted: 2024-04-27

## 2024-04-27 PROBLEM — G93.49 ENCEPHALOPATHY CHRONIC: Chronic | Status: ACTIVE | Noted: 2024-04-27

## 2024-04-27 LAB
ALBUMIN SERPL BCP-MCNC: 2.6 G/DL (ref 3.5–5.2)
ALP SERPL-CCNC: 84 U/L (ref 55–135)
ALT SERPL W/O P-5'-P-CCNC: 16 U/L (ref 10–44)
ANION GAP SERPL CALC-SCNC: 12 MMOL/L (ref 8–16)
AST SERPL-CCNC: 37 U/L (ref 10–40)
BACTERIA STL CULT: NORMAL
BACTERIA STL CULT: NORMAL
BASOPHILS # BLD AUTO: 0.05 K/UL (ref 0–0.2)
BASOPHILS NFR BLD: 0.3 % (ref 0–1.9)
BILIRUB SERPL-MCNC: 0.6 MG/DL (ref 0.1–1)
BNP SERPL-MCNC: 223 PG/ML (ref 0–99)
BUN SERPL-MCNC: 69 MG/DL (ref 8–23)
CALCIUM SERPL-MCNC: 8.6 MG/DL (ref 8.7–10.5)
CHLORIDE SERPL-SCNC: 111 MMOL/L (ref 95–110)
CO2 SERPL-SCNC: 22 MMOL/L (ref 23–29)
CREAT SERPL-MCNC: 1.9 MG/DL (ref 0.5–1.4)
DIFFERENTIAL METHOD BLD: ABNORMAL
EOSINOPHIL # BLD AUTO: 0 K/UL (ref 0–0.5)
EOSINOPHIL NFR BLD: 0.1 % (ref 0–8)
ERYTHROCYTE [DISTWIDTH] IN BLOOD BY AUTOMATED COUNT: 16.9 % (ref 11.5–14.5)
EST. GFR  (NO RACE VARIABLE): 28.8 ML/MIN/1.73 M^2
GLUCOSE SERPL-MCNC: 105 MG/DL (ref 70–110)
GLUCOSE SERPL-MCNC: 174 MG/DL (ref 70–110)
GLUCOSE SERPL-MCNC: 190 MG/DL (ref 70–110)
GLUCOSE SERPL-MCNC: 67 MG/DL (ref 70–110)
GLUCOSE SERPL-MCNC: 68 MG/DL (ref 70–110)
GLUCOSE SERPL-MCNC: 74 MG/DL (ref 70–110)
GLUCOSE SERPL-MCNC: 87 MG/DL (ref 70–110)
HCT VFR BLD AUTO: 25.5 % (ref 37–48.5)
HGB BLD-MCNC: 10 G/DL (ref 12–16)
HGB BLD-MCNC: 8.8 G/DL (ref 12–16)
HGB BLD-MCNC: 8.8 G/DL (ref 12–16)
HGB BLD-MCNC: 9.8 G/DL (ref 12–16)
IMM GRANULOCYTES # BLD AUTO: 0.39 K/UL (ref 0–0.04)
IMM GRANULOCYTES NFR BLD AUTO: 2 % (ref 0–0.5)
LACTATE SERPL-SCNC: 0.8 MMOL/L (ref 0.5–1.9)
LYMPHOCYTES # BLD AUTO: 0.9 K/UL (ref 1–4.8)
LYMPHOCYTES NFR BLD: 4.4 % (ref 18–48)
MAGNESIUM SERPL-MCNC: 1.9 MG/DL (ref 1.6–2.6)
MCH RBC QN AUTO: 27.7 PG (ref 27–31)
MCHC RBC AUTO-ENTMCNC: 34.5 G/DL (ref 32–36)
MCV RBC AUTO: 80 FL (ref 82–98)
MONOCYTES # BLD AUTO: 0.9 K/UL (ref 0.3–1)
MONOCYTES NFR BLD: 4.6 % (ref 4–15)
NEUTROPHILS # BLD AUTO: 17.4 K/UL (ref 1.8–7.7)
NEUTROPHILS NFR BLD: 88.6 % (ref 38–73)
NRBC BLD-RTO: 0 /100 WBC
PLATELET # BLD AUTO: 476 K/UL (ref 150–450)
PMV BLD AUTO: 10.4 FL (ref 9.2–12.9)
POTASSIUM SERPL-SCNC: 3.2 MMOL/L (ref 3.5–5.1)
PROT SERPL-MCNC: 6.5 G/DL (ref 6–8.4)
RBC # BLD AUTO: 3.18 M/UL (ref 4–5.4)
SODIUM SERPL-SCNC: 145 MMOL/L (ref 136–145)
WBC # BLD AUTO: 19.61 K/UL (ref 3.9–12.7)

## 2024-04-27 PROCEDURE — 99233 SBSQ HOSP IP/OBS HIGH 50: CPT | Mod: ,,, | Performed by: INTERNAL MEDICINE

## 2024-04-27 PROCEDURE — 85025 COMPLETE CBC W/AUTO DIFF WBC: CPT | Performed by: HOSPITALIST

## 2024-04-27 PROCEDURE — 80053 COMPREHEN METABOLIC PANEL: CPT | Performed by: HOSPITALIST

## 2024-04-27 PROCEDURE — 25000003 PHARM REV CODE 250: Performed by: INTERNAL MEDICINE

## 2024-04-27 PROCEDURE — 36415 COLL VENOUS BLD VENIPUNCTURE: CPT | Performed by: HOSPITALIST

## 2024-04-27 PROCEDURE — 83880 ASSAY OF NATRIURETIC PEPTIDE: CPT | Performed by: INTERNAL MEDICINE

## 2024-04-27 PROCEDURE — 63600175 PHARM REV CODE 636 W HCPCS: Performed by: EMERGENCY MEDICINE

## 2024-04-27 PROCEDURE — 21000000 HC CCU ICU ROOM CHARGE

## 2024-04-27 PROCEDURE — 63600175 PHARM REV CODE 636 W HCPCS: Performed by: INTERNAL MEDICINE

## 2024-04-27 PROCEDURE — 85018 HEMOGLOBIN: CPT | Mod: 91 | Performed by: HOSPITALIST

## 2024-04-27 PROCEDURE — 25000003 PHARM REV CODE 250: Performed by: EMERGENCY MEDICINE

## 2024-04-27 PROCEDURE — 83735 ASSAY OF MAGNESIUM: CPT | Performed by: HOSPITALIST

## 2024-04-27 PROCEDURE — 27000221 HC OXYGEN, UP TO 24 HOURS

## 2024-04-27 PROCEDURE — C9113 INJ PANTOPRAZOLE SODIUM, VIA: HCPCS | Performed by: INTERNAL MEDICINE

## 2024-04-27 PROCEDURE — 63600175 PHARM REV CODE 636 W HCPCS: Performed by: HOSPITALIST

## 2024-04-27 PROCEDURE — 94761 N-INVAS EAR/PLS OXIMETRY MLT: CPT

## 2024-04-27 PROCEDURE — 83605 ASSAY OF LACTIC ACID: CPT | Performed by: INTERNAL MEDICINE

## 2024-04-27 PROCEDURE — 25000003 PHARM REV CODE 250: Performed by: HOSPITALIST

## 2024-04-27 PROCEDURE — C9113 INJ PANTOPRAZOLE SODIUM, VIA: HCPCS | Performed by: EMERGENCY MEDICINE

## 2024-04-27 PROCEDURE — 85018 HEMOGLOBIN: CPT | Performed by: HOSPITALIST

## 2024-04-27 RX ORDER — SCOLOPAMINE TRANSDERMAL SYSTEM 1 MG/1
1 PATCH, EXTENDED RELEASE TRANSDERMAL
Status: DISCONTINUED | OUTPATIENT
Start: 2024-04-27 | End: 2024-05-08 | Stop reason: HOSPADM

## 2024-04-27 RX ORDER — POTASSIUM CHLORIDE 7.45 MG/ML
40 INJECTION INTRAVENOUS ONCE
Status: COMPLETED | OUTPATIENT
Start: 2024-04-27 | End: 2024-04-27

## 2024-04-27 RX ORDER — PANTOPRAZOLE SODIUM 40 MG/1
40 TABLET, DELAYED RELEASE ORAL 2 TIMES DAILY
Status: DISCONTINUED | OUTPATIENT
Start: 2024-04-27 | End: 2024-04-27

## 2024-04-27 RX ORDER — PANTOPRAZOLE SODIUM 40 MG/10ML
40 INJECTION, POWDER, LYOPHILIZED, FOR SOLUTION INTRAVENOUS 2 TIMES DAILY
Status: DISCONTINUED | OUTPATIENT
Start: 2024-04-27 | End: 2024-05-08 | Stop reason: HOSPADM

## 2024-04-27 RX ORDER — SODIUM CHLORIDE 450 MG/100ML
INJECTION, SOLUTION INTRAVENOUS CONTINUOUS
Status: DISCONTINUED | OUTPATIENT
Start: 2024-04-27 | End: 2024-04-27

## 2024-04-27 RX ORDER — FUROSEMIDE 10 MG/ML
40 INJECTION INTRAMUSCULAR; INTRAVENOUS ONCE
Status: COMPLETED | OUTPATIENT
Start: 2024-04-27 | End: 2024-04-27

## 2024-04-27 RX ADMIN — PANTOPRAZOLE SODIUM 8 MG/HR: 40 INJECTION, POWDER, FOR SOLUTION INTRAVENOUS at 02:04

## 2024-04-27 RX ADMIN — VANCOMYCIN HYDROCHLORIDE 125 MG: KIT at 05:04

## 2024-04-27 RX ADMIN — SODIUM CHLORIDE: 0.45 INJECTION, SOLUTION INTRAVENOUS at 09:04

## 2024-04-27 RX ADMIN — PANTOPRAZOLE SODIUM 40 MG: 40 INJECTION, POWDER, FOR SOLUTION INTRAVENOUS at 08:04

## 2024-04-27 RX ADMIN — LEVETIRACETAM 500 MG: 5 INJECTION INTRAVENOUS at 08:04

## 2024-04-27 RX ADMIN — MEROPENEM 500 MG: 500 INJECTION, POWDER, FOR SOLUTION INTRAVENOUS at 07:04

## 2024-04-27 RX ADMIN — VANCOMYCIN HYDROCHLORIDE 125 MG: KIT at 12:04

## 2024-04-27 RX ADMIN — SCOPALAMINE 1 PATCH: 1 PATCH, EXTENDED RELEASE TRANSDERMAL at 09:04

## 2024-04-27 RX ADMIN — ACETAMINOPHEN 650 MG: 325 TABLET ORAL at 04:04

## 2024-04-27 RX ADMIN — POTASSIUM CHLORIDE 40 MEQ: 7.46 INJECTION, SOLUTION INTRAVENOUS at 09:04

## 2024-04-27 RX ADMIN — DEXTROSE MONOHYDRATE 12.5 G: 25 INJECTION, SOLUTION INTRAVENOUS at 11:04

## 2024-04-27 RX ADMIN — HYDROCODONE BITARTRATE AND ACETAMINOPHEN 1 TABLET: 5; 325 TABLET ORAL at 08:04

## 2024-04-27 RX ADMIN — Medication 6 MG: at 08:04

## 2024-04-27 RX ADMIN — FUROSEMIDE 40 MG: 10 INJECTION, SOLUTION INTRAVENOUS at 01:04

## 2024-04-27 RX ADMIN — PANTOPRAZOLE SODIUM 8 MG/HR: 40 INJECTION, POWDER, FOR SOLUTION INTRAVENOUS at 06:04

## 2024-04-27 RX ADMIN — SODIUM CHLORIDE: 9 INJECTION, SOLUTION INTRAVENOUS at 12:04

## 2024-04-27 RX ADMIN — MUPIROCIN 1 G: 20 OINTMENT TOPICAL at 08:04

## 2024-04-27 RX ADMIN — LEVETIRACETAM 500 MG: 5 INJECTION INTRAVENOUS at 10:04

## 2024-04-27 NOTE — NURSING
...Nurses Note -- 4 Eyes      4/27/2024   0745 AM      Skin assessed during: Daily Assessment      [] No Altered Skin Integrity Present    []Prevention Measures Documented      [x] Yes- Altered Skin Integrity Present or Discovered   [] LDA Added if Not in Epic (Describe Wound)   [] New Altered Skin Integrity was Present on Admit and Documented in LDA   [x] Wound Image Taken    Wound Care Consulted? Yes    Attending Nurse:  Miracle Callahan RN    Second RN/Staff Member:  Jorden Rosenthal RN

## 2024-04-27 NOTE — PROGRESS NOTES
INPATIENT NEPHROLOGY Progress Note  [facility]    Patient Name: Steff Johnson  Date: 04/27/2024    Reason for consultation: COURTNEY    Chief Complaint:   Chief Complaint   Patient presents with    GI Problem     Brown fluid being withdrawn from peg tube, vomiting brown fluid. Smells of feces. Since this morning. Pt is nonverbal       History of Present Illness:  63 yo female with PMH of CVA with known baseline confusion and aphasia who was sent to the ER because of black drainage coming from around the PEG tube site and having black vomit. Pt started to have projectile hematemesis in the ER. NGT was placed. Del Rosario was replaced after which pt developed hematuria. UA was grossly positive. C.diffi antigen is positive. CT C/A/P showed scattered nonspecific bronchiolitis in both lungs, with right lower lobes opacities potentially reflecting chronic bronchopneumonia, given similar appearance to CT of 12/11/2023. Consulted for COURTNEY.    Interval History:  4/26- febrile and hypotensive, at admission, on 3L NC, UOP 500cc, Hb 6.2- got 2u of blood- Hb improved  4/27- went for EGD yest- febrile, BP stable, on 4L NC, UOP 1.5L    Plan of Care:    Assessment:  COURTNEY  Acute GIB  C diff diarrhea  Hypernatremia/Hypokalemia/Acidosis (lactic)  Hx of HTN    Plan:    - suspect COURTNEY due to hemodynamic injury from hypotension, GI losses, and ABLA  - she remains on IVFs, antbx and is s/p blood transfusion  - renal function is improved- nonoliguric  - due to hypernatremia change NS to 1/2 NS at 75cc/hr  - ordered 40mEq IV KCl  - acidosis is improving  - dose meds for CrCl < 30  - no nsaids or IV contrast- strict ins/outs via del rosario  - hold norvasc, losartan, lasix    Thank you for allowing us to participate in this patient's care. We will continue to follow.    Vital Signs:  Temp Readings from Last 3 Encounters:   04/27/24 (!) 100.5 °F (38.1 °C) (Axillary)   02/29/24 98.9 °F (37.2 °C) (Oral)   02/14/24 98.5 °F (36.9 °C) (Oral)        Pulse Readings from Last 3 Encounters:   04/27/24 84   04/26/24 101   02/29/24 74       BP Readings from Last 3 Encounters:   04/27/24 126/60   04/26/24 (!) 156/83   02/29/24 139/74       Weight:  Wt Readings from Last 3 Encounters:   04/26/24 53.5 kg (118 lb)   02/23/24 54.2 kg (119 lb 7.8 oz)   02/14/24 61.7 kg (136 lb)       INS/OUTS:  I/O last 3 completed shifts:  In: 5174.2 [I.V.:3244.2; Blood:600; Other:60; NG/GT:240; IV Piggyback:1030]  Out: 2300 [Urine:2000; Stool:300]  No intake/output data recorded.    Medications:  Scheduled Meds:  Current Facility-Administered Medications   Medication Dose Route Frequency    insulin aspart U-100  0-5 Units Subcutaneous Q4H    insulin detemir U-100  15 Units Subcutaneous QHS    levETIRAcetam (Keppra) IV (PEDS and ADULTS)  500 mg Intravenous Q12H    meropenem IV (PEDS and ADULTS)  500 mg Intravenous Q12H    mupirocin   Nasal BID    vancomycin  125 mg Per G Tube Q6H     Continuous Infusions:  Current Facility-Administered Medications   Medication Dose Route Frequency Last Rate Last Admin    sodium chloride 0.9%   Intravenous Continuous 100 mL/hr at 04/27/24 0021 New Bag at 04/27/24 0021    pantoprazole (PROTONIX) IV infusion  8 mg/hr Intravenous Continuous 20 mL/hr at 04/27/24 0654 8 mg/hr at 04/27/24 0654     PRN Meds:.  Current Facility-Administered Medications:     0.9%  NaCl infusion (for blood administration), , Intravenous, Q24H PRN    acetaminophen, 650 mg, Oral, Q4H PRN    aluminum-magnesium hydroxide-simethicone, 30 mL, Oral, QID PRN    [COMPLETED] calcium gluconate IVPB, 1 g, Intravenous, ED 1 Time **AND** calcium gluconate IVPB, 1 g, Intravenous, Q10 Min PRN    cloNIDine, 0.1 mg, Oral, Q8H PRN    dextrose 50%, 12.5 g, Intravenous, PRN    dextrose 50%, 25 g, Intravenous, PRN    glucagon (human recombinant), 1 mg, Intramuscular, PRN    glucose, 16 g, Oral, PRN    glucose, 24 g, Oral, PRN    HYDROcodone-acetaminophen, 1 tablet, Oral, Q6H PRN    magnesium  oxide, 800 mg, Oral, PRN    magnesium oxide, 800 mg, Oral, PRN    melatonin, 6 mg, Oral, Nightly PRN    naloxone, 0.02 mg, Intravenous, PRN    ondansetron, 4 mg, Intravenous, Q6H PRN    potassium bicarbonate, 35 mEq, Oral, PRN    potassium bicarbonate, 50 mEq, Oral, PRN    potassium bicarbonate, 60 mEq, Oral, PRN    potassium, sodium phosphates, 2 packet, Oral, PRN    potassium, sodium phosphates, 2 packet, Oral, PRN    potassium, sodium phosphates, 2 packet, Oral, PRN    sodium chloride 0.9%, 10 mL, Intravenous, Q12H PRN  No current facility-administered medications on file prior to encounter.     Current Outpatient Medications on File Prior to Encounter   Medication Sig Dispense Refill    allopurinoL (ZYLOPRIM) 100 MG tablet 100 mg by Per G Tube route once daily.      amLODIPine (NORVASC) 10 MG tablet 1 tablet (10 mg total) by Per G Tube route once daily.      apixaban (ELIQUIS) 2.5 mg Tab 2.5 mg by Per G Tube route 2 (two) times daily.      ascorbic acid, vitamin C, (VITAMIN C) 500 MG tablet 500 mg by Per G Tube route 2 (two) times daily.      atorvastatin (LIPITOR) 80 MG tablet 1 tablet (80 mg total) by Per G Tube route every evening. 30 tablet 0    folic acid (FOLVITE) 1 MG tablet 1 tablet by Per G Tube route every morning.      furosemide (LASIX) 40 MG tablet 40 mg by Per G Tube route once daily.      HUMALOG KWIKPEN INSULIN 100 unit/mL pen Inject 0-20 Units into the skin As instructed (inject 4 times daily per sliding scale). 0 -149 = 0 units  150 - 199 = 4 units  200 - 249 = 8 units  250 - 299 = 12 units  300 - 349 = 16 units  350 - 1000 = 20 units Call MD      insulin (LANTUS SOLOSTAR U-100 INSULIN) glargine 100 units/mL SubQ pen Inject 35 Units into the skin once daily.      insulin lispro 100 unit/mL injection Inject 2 Units into the skin 3 (three) times daily before meals. 07:30  11:30  16:30      latanoprost 0.005 % ophthalmic solution Place 1 drop into both eyes once daily. (Patient taking differently:  "Place 1 drop into both eyes every evening.) 7.5 mL 6    levETIRAcetam (KEPPRA) 100 mg/mL Soln 500 mg by Per G Tube route 2 (two) times daily.      losartan (COZAAR) 100 MG tablet 1 tablet (100 mg total) by Per G Tube route once daily.      magnesium oxide (MAG-OX) 400 mg (241.3 mg magnesium) tablet 400 mg by Per G Tube route once daily.      metFORMIN (GLUCOPHAGE) 1000 MG tablet Take 1,000 mg by mouth 2 (two) times daily.      metoclopramide HCl (REGLAN) 5 mg/5 mL Soln 10 mg by Per G Tube route 3 (three) times daily.      metoprolol tartrate (LOPRESSOR) 25 MG tablet 25 mg by Per G Tube route 2 (two) times daily.      multivitamin (THERAGRAN) per tablet 1 tablet by Per G Tube route once daily.      omeprazole (PRILOSEC) 20 MG capsule Take 20 mg by mouth once daily. Per G-Tube      polyethylene glycol (GLYCOLAX) 17 gram PwPk 17 g by Per G Tube route once daily.      thiamine (VITAMIN B-1) 100 MG tablet 100 mg by Per G Tube route once daily.      zinc sulfate (ZINC-220 ORAL) 1 tablet by PEG Tube route Daily.      aspirin 81 MG Chew 1 tablet (81 mg total) by Per G Tube route once daily.      baclofen (LIORESAL) 5 mg Tab tablet 5 mg by Per G Tube route every 8 (eight) hours as needed (muscle spasms).      cloNIDine (CATAPRES) 0.1 MG tablet 1 tablet (0.1 mg total) by Per G Tube route 3 (three) times daily. 90 tablet 11    tiotropium (SPIRIVA) 18 mcg inhalation capsule Inhale 1 capsule (18 mcg total) into the lungs once daily. Controller 30 capsule 0    [DISCONTINUED] blood-glucose meter (TRUE METRIX GLUCOSE METER) kit Use as instructed 1 each 0    [DISCONTINUED] lancing device (TRUEDRAW LANCING DEVICE) Misc Inject 1 Device into the skin 2 (two) times daily. 1 each 3     Review of Systems:  Neg    Physical Exam:  /60   Pulse 84   Temp (!) 100.5 °F (38.1 °C) (Axillary)   Resp 18   Ht 4' 11" (1.499 m)   Wt 53.5 kg (118 lb)   SpO2 96%   BMI 23.83 kg/m²     General Appearance:    Ill   Head:    Normocephalic, " atraumatic   Eyes:    Closed    Mouth:   Dry MM   Back:     Cannot assess   Lungs:     Clear    Heart:    Tachy   Abdomen:     Soft, non-tender, non-distended   Extremities:   Warm and well perfused   MSK:   No joint or muscle swelling, tenderness or deformity   Skin:   Skin color, texture, turgor normal, no rashes or lesions   Neurologic/Psychiatric:   Cannot assess     Results:  Lab Results   Component Value Date     04/27/2024    K 3.2 (L) 04/27/2024     (H) 04/27/2024    CO2 22 (L) 04/27/2024    BUN 69 (H) 04/27/2024    CREATININE 1.9 (H) 04/27/2024    CALCIUM 8.6 (L) 04/27/2024    ANIONGAP 12 04/27/2024    ESTGFRAFRICA 93 09/01/2023    EGFRNONAA 59.7 (A) 05/20/2022       Lab Results   Component Value Date    CALCIUM 8.6 (L) 04/27/2024    PHOS 3.8 04/25/2024       Recent Labs   Lab 04/27/24  0615   WBC 19.61*   RBC 3.18*   HGB 8.8*  8.8*   HCT 25.5*   *   MCV 80*   MCH 27.7   MCHC 34.5       I have personally reviewed pertinent radiological imaging and reports.    I have spent > 35 minutes providing care for this patient for the above diagnoses. These services have included chart/data/imaging review, evaluation, exam, formulation of plan, , note preparation, and discussions with staff involved in this patient's care.    Alejandro Alvarenga MD MPH  [facility] [address] [address]  [phone] (p)  [phone] (f)

## 2024-04-27 NOTE — PLAN OF CARE
Problem: Infection  Goal: Absence of Infection Signs and Symptoms  Outcome: Progressing     Problem: Adult Inpatient Plan of Care  Goal: Plan of Care Review  Outcome: Progressing  Goal: Patient-Specific Goal (Individualized)  Outcome: Progressing  Goal: Absence of Hospital-Acquired Illness or Injury  Outcome: Progressing  Goal: Optimal Comfort and Wellbeing  Outcome: Progressing  Goal: Readiness for Transition of Care  Outcome: Progressing     Problem: Diabetes Comorbidity  Goal: Blood Glucose Level Within Targeted Range  Outcome: Progressing     Problem: Sepsis/Septic Shock  Goal: Optimal Coping  Outcome: Progressing  Goal: Absence of Bleeding  Outcome: Progressing  Goal: Blood Glucose Level Within Targeted Range  Outcome: Progressing  Goal: Absence of Infection Signs and Symptoms  Outcome: Progressing  Goal: Optimal Nutrition Intake  Outcome: Progressing     Problem: Acute Kidney Injury/Impairment  Goal: Fluid and Electrolyte Balance  Outcome: Progressing  Goal: Improved Oral Intake  Outcome: Progressing  Goal: Effective Renal Function  Outcome: Progressing     Problem: Skin Injury Risk Increased  Goal: Skin Health and Integrity  Outcome: Progressing

## 2024-04-27 NOTE — NURSING
64 yr old female   Sacral    Sacral stage 2 pink red yellow slough crusty 3x1x.8 scars noted to the area     Left calf open healing stage 2 2x1x.1    Healed scar right foot    Recommendation:  Sacral and left calf  Clean with chlorhexidine/ns.  Pat dry. Apply Medihoney and cover with mepilex. Daily

## 2024-04-27 NOTE — CARE UPDATE
04/26/24 5846   Patient Assessment/Suction   Level of Consciousness (AVPU) alert   Respiratory Effort Unlabored   Expansion/Accessory Muscles/Retractions no use of accessory muscles   All Lung Fields Breath Sounds clear;coarse   Rhythm/Pattern, Respiratory unlabored   Cough Frequency no cough   PRE-TX-O2   Device (Oxygen Therapy) nasal cannula   $ Is the patient on Low Flow Oxygen? Yes   Flow (L/min) (Oxygen Therapy) 4   SpO2 (!) 94 %   Pulse Oximetry Type Continuous   $ Pulse Oximetry - Multiple Charge Pulse Oximetry - Multiple   Pulse 98   Resp (!) 25   Positioning   Head of Bed (HOB) Positioning HOB elevated;HOB at 30 degrees

## 2024-04-27 NOTE — PROGRESS NOTES
Patient: Steff Johnson     MRN: [id number]    [facility] ACCT #: [id number]    : 1960 Age: 64 y.o. Sex:female  Room: 2528 Bed: 2528-a    Admit Date: 2024   Pt. Type: IP- Inpatient    Admitting Physician: Celine Buck MD    Attending Physician: Carlos Evans MD     Date of service: 2024        Admission diagnosis  Sepsis [A41.9]    Hospital course:  66 years old female patient with history of CVA, aphasic, dysphagia with PEG tube in place, chronic indwelling Mathur catheter, bed-bound status, nursing resident,  brought to the emergency room due to coffee-ground vomiting and also from PEG tube.  Patient has also been noted with loose stools.  NG tube was placed in the emergency room.  Stool study has been done which is positive for C difficile antigen.   CT scan of chest is suggestive of nonspecific bronchiolitis in both lungs, with right lower lobes opacities potentially reflecting chronic bronchopneumonia, similar to previous CT scan in 2023. Mathur catheter has been replaced in the ED. Rectal tube was also placed in the ED.  Patient has been started on empiric antibiotic therapy.  She has undergone EGD which showed esophagus with moderate-to-severe distal esophagitis with 3 cm have the hernia as well as nonerosive gastritis; put on pump inhibitor has been recommended.  Patient cleared to resume anticoagulation.    Subjective:   Nonverbal patient.  Patient continues to be afebrile with a highest of 101.8 F in last 24 hours.    Review of systems   unobtainable    Medications    Current Facility-Administered Medications:     0.9%  NaCl infusion (for blood administration), , Intravenous, Q24H PRN, Celine Buck MD    acetaminophen tablet 650 mg, 650 mg, Oral, Q4H PRN, Celine Buck MD, 650 mg at 24 0427    aluminum-magnesium hydroxide-simethicone 200-200-20 mg/5 mL suspension 30 mL, 30 mL, Oral, QID PRN, Celine Buck MD    [COMPLETED] calcium gluconate 1 g in NS IVPB (premixed), 1 g,  Intravenous, ED 1 Time, Stopped at 04/25/24 1754 **AND** calcium gluconate 1 g in NS IVPB (premixed), 1 g, Intravenous, Q10 Min PRN, Fouzia Mcmahon NP    cloNIDine tablet 0.1 mg, 0.1 mg, Oral, Q8H PRN, Celine Buck MD, 0.1 mg at 04/26/24 1635    dextrose 50% injection 12.5 g, 12.5 g, Intravenous, PRNHeber Mira S., MD, 12.5 g at 04/27/24 1147    dextrose 50% injection 25 g, 25 g, Intravenous, PRNHeber Mira S., MD    glucagon (human recombinant) injection 1 mg, 1 mg, Intramuscular, PRN, Celine Buck MD    glucose chewable tablet 16 g, 16 g, Oral, PRNHeber Mira S., MD    glucose chewable tablet 24 g, 24 g, Oral, PRN, Celine Buck MD    HYDROcodone-acetaminophen 5-325 mg per tablet 1 tablet, 1 tablet, Oral, Q6H PRN, Celine Buck MD    insulin aspart U-100 pen 0-5 Units, 0-5 Units, Subcutaneous, Q4H, Celine Buck MD, 1 Units at 04/26/24 0606    insulin detemir U-100 (Levemir) pen 15 Units, 15 Units, Subcutaneous, QHS, Celine Buck MD, 10 Units at 04/26/24 0110    levETIRAcetam in NaCl (iso-os) IVPB 500 mg, 500 mg, Intravenous, Q12H, Celine Buck MD, Stopped at 04/27/24 1058    magnesium oxide tablet 800 mg, 800 mg, Oral, PRNHeber Mira S., MD    magnesium oxide tablet 800 mg, 800 mg, Oral, PRNHeber Mira S., MD    melatonin tablet 6 mg, 6 mg, Oral, Nightly PRN, Celine Buck MD    meropenem 500 mg in sodium chloride 0.9 % 100 mL IVPB (ready to mix system), 500 mg, Intravenous, Q12H, Celine Buck MD, Stopped at 04/27/24 0814    mupirocin 2 % ointment, , Nasal, BID, Celine Buck MD, 1 g at 04/26/24 2200    naloxone 0.4 mg/mL injection 0.02 mg, 0.02 mg, Intravenous, PRN, Celine Buck MD    ondansetron injection 4 mg, 4 mg, Intravenous, Q6H PRN, Celine Buck MD    pantoprazole injection 40 mg, 40 mg, Intravenous, BID, Carlos Evans MD    potassium bicarbonate disintegrating tablet 35 mEq, 35 mEq, Oral, PRN, Celine Buck MD    potassium bicarbonate disintegrating tablet 50  mEq, 50 mEq, Oral, PRN, Celine Buck MD    potassium bicarbonate disintegrating tablet 60 mEq, 60 mEq, Oral, PRN, Celine Buck MD    potassium, sodium phosphates 280-160-250 mg packet 2 packet, 2 packet, Oral, Heber DE LOS SANTOS Mira S., MD    potassium, sodium phosphates 280-160-250 mg packet 2 packet, 2 packet, Oral, PRHeber VENTURA Mira S., MD    potassium, sodium phosphates 280-160-250 mg packet 2 packet, 2 packet, Oral, Heber DE LOS SANTOS Mira S., MD    sodium chloride 0.9% flush 10 mL, 10 mL, Intravenous, Q12H PRN, Celine Buck MD    vancomycin 125 mg/5 mL oral solution 125 mg, 125 mg, Per G Tube, Q6H, Celine Buck MD, 125 mg at 04/27/24 1252     Last Recorded Vitals  Vitals:    04/27/24 1300   BP: (!) 157/73   Pulse: 99   Resp: 20   Temp:         Physical Exam  General:   frail elderly patient in bed  HEENT:  Head is normocephalic atraumatic, PERRLA, EOMI, oral cavity moist  Neck:  Supple, no thyromegaly, trachea midline position, no JVD noted   Respiratory:  Crackles bilaterally ; on oxygen via nasal cannula 3 L  Cardiovascular:  tachycardic, regular rhythm, S1-S2 normal, no murmurs or rubs or gallops noted  Abdomen:  PEG tube noted with tube feedings ongoing; rectal tube in place with semi liquid brownish stool  Genitourinary:   Mathur catheter in place    Skin:                 Musculoskeletal:  bed-bound; quadriplegia  CNS:   awake ; noninteractive;  nonverbal; quadriplegia      Laboratory data:   Recent Labs   Lab 04/25/24  1701 04/25/24  1854 04/26/24  0405 04/26/24  0608 04/27/24  0014 04/27/24  0615 04/27/24  1121   WBC 24.95*  --  24.03*  --   --  19.61*  --    HGB 7.3*   < > 9.4*   < > 10.0* 8.8*  8.8* 9.8*   HCT 22.3*  --  27.9*  --   --  25.5*  --    *  --  482*  --   --  476*  --     < > = values in this interval not displayed.       Recent Labs   Lab 04/25/24  1223 04/25/24  2146 04/26/24  0405 04/27/24  0615   * 135* 136 145   K 5.9* 4.1 4.3 3.2*   CL 96 104 101 111*   CO2 18* 11* 20* 22*    BUN 93* 84* 96* 69*   CREATININE 2.5* 2.7* 3.1* 1.9*   CALCIUM 9.7 7.4* 8.3* 8.6*   PROT 8.0  --  6.6 6.5   BILITOT 0.6  --  0.9 0.6   ALKPHOS 89  --  68 84   ALT 15  --  10 16   AST 18  --  19 37       Imaging studies:  X-Ray Chest AP Portable  Narrative: EXAMINATION:  XR CHEST AP PORTABLE    CLINICAL HISTORY:  SOB;    COMPARISON:  04/26/2024    FINDINGS:  Enteric tube has been removed.  Cardiomediastinal silhouette is stable.  Pleuroparenchymal opacity at the right lung base likely representing a combination of pleural effusion and underlying airspace disease, worsened compared to prior, noting differences in technique which could be confounding.  Subsegmental linear opacity left lung base suggestive of atelectasis.  No pneumothorax.  Impression: Small right pleural effusion and right basilar airspace disease, worsened compared to prior.    Subsegmental atelectasis left lung base.    Interval removal of enteric tube.    Electronically signed by: Preston Ham  Date:    04/27/2024  Time:    10:59       Assessement and Plan:  Active Hospital Problems    Diagnosis  POA    *GI hemorrhage [K92.2]  Yes    Quadriplegia [G82.50]  Yes     Chronic    Encephalopathy chronic [G93.49]  Yes     Chronic    Sacral decubitus ulcer [L89.159]  Yes     Chronic    Severe sepsis [A41.9, R65.20]  Yes    Hyperkalemia [E87.5]  Yes    Acute blood loss anemia [D62]  Yes    Complicated UTI (urinary tract infection) [N39.0]  Yes    Clostridium difficile infection [A49.8]  Yes    Hematuria [R31.9]  Yes    Hypertension [I10]  Yes    Type 2 diabetes mellitus with neurologic complication, with long-term current use of insulin [E11.49, Z79.4]  Not Applicable    Acute renal failure [N17.9]  Yes    Seizure [R56.9]  Yes    CVA (cerebral vascular accident) [I63.9]  Yes    Aphasia [R47.01]  Yes      Resolved Hospital Problems   No resolved problems to display.          Severe sepsis   Suspected due to complicated UTI and C difficile colitis   History  of MDRO and ESBL   WBC:28-->24   Lactic acid:  6.1----->3.3     Positive C difficile antigen   Started  on meropenem and oral vancomycin   IV fluids    Urine culture:  Gram-negative rods; providencia stuartii   Blood culture:  Gram-positive cocci; staph species   Mathur catheter has been exchanged in the emergency room   Contact isolation precautions   ID consulted       Suspected upper GI bleed   80 mg IV pantoprazole given;  pantoprazole IV drip   Aspirin and Eliquis on hold   NG tube in place; low intermittent suction   GI consulted  EGD:  Moderately severe distal esophagitis; 3 cm how to hernia; nonerosive gastritis  Continue pantoprazole  Okay to resume anticoagulation     Acute blood loss anemia   Hemoglobin: 8.8--> 6.2-->10.0-->9.8    Transfused 2 units of PRBC   Monitor hemoglobin/hematocrit     Acute renal failure   Likely due to loose stools and GI bleed   Creatinine: 0.8-->2.5-->3.1-->1.9    Monitor closely       Diabetes mellitus   Glucose checks and supplemental insulin as needed   Get hemoglobin A1c   Currently on insulin detemir 15 units night     Hyperkalemia, resolved  K:  5.9-->4.3     Gross Hematuria    Traumatic Mathur placement as per report   Improving      History of seizure   On levetiracetam     History of CVA  Chronic encephalopathy   Nonverbal   Bed-bound  Quadriplegia   Aspirin and Eliquis on hold   Aspiration precautions     Dysphagia   Peg tube in place   Feedings to be resumed      Sacrococcygeal decubitus ulcers   Minimal skin breakdown in left leg and right foot   Frequent turning      Hypertension    Home antihypertensives on hold for now    Suspected fluid overload on 4/27  Chest x-ray:  Small right pleural effusion and right basilar airspace disease, worsened compared to prior; subsegmental atelectasis left lung base  BNP: 223  IV fluid discontinued  Furosemide 40 mg IV x1 given      DVT Prophylaxis:   SCDs      Code status: Full Code    Disposition: await clinical improvement;  eventual discharge back to skilled nursing facility    Disclaimer:  The above note was dictated utilizing speech recognition software.  Efforts were made to minimize and correct any transcription errors.     Carlos Evans MD

## 2024-04-27 NOTE — PROGRESS NOTES
Formerly [facility]   Department of Infectious Disease  Consult Note        PATIENT NAME: Steff Johnson  MRN: [id number]  TODAY'S DATE: 04/27/2024  ADMIT DATE: 4/25/2024  LOS: 2 days    CHIEF COMPLAINT: GI Problem (Brown fluid being withdrawn from peg tube, vomiting brown fluid. Smells of feces. Since this morning. Pt is nonverbal)      PRINCIPLE PROBLEM: GI hemorrhage    REASON FOR CONSULT:       INTERVAL HISTORY   04/27/2024.  Dr. Calvo covering for the weekend.  Patient is nonverbal as usual.  I feel like she understands me when I talk to her.  Talking gently and slowly puts her at ease.  She is afebrile at this time.  T-max yesterday was 101.8.  Much better than T-max on admission of 102.3  Blood cultures are showing Staphylococcus species.  Will order repeat blood cultures   Urine cultures are showing Providencia and a LPGNR in progress.    Stool cultures no growth to date.    ASSESSMENT and PLAN      1. Catheter associated UTI.  She previously grew ESBL Proteus in December 2023.  Catheter changed on 04/25/24.    Continue meropenem.     Follow final urine cultures.  So far is showing Providencia and LPGNR     2. Staphylococcus species bacteremia.  This may be a contaminant.  Await ID for significance. Meropenem should cover    3. Positive C diff antigen with negative PCR and toxin assay.  This may not be toxigenic C diff infection.  No colitis on CT abdomen.      4. COURTNEY.  Creatinine improved to 0.7--3.1--1.9.  Management as per hospitalist.    5. Anemia with hematocrit drop.  Hemoglobin 6.2--10.9--9.8 As per hospitalist, GI/ EGD     PMHx CVA, aphasic, dysphagia with PEG tube in place, chronic indwelling Mathur catheter, bed-bound status, nursing resident     This patient is high risk for life-threatening deterioration and death secondary to above comorbidities and need for IV treatment    RECOMMENDATIONS:    Continue Meropenem, renally dosed.     Even if C diff toxin is negative, there is benefit to giving  p.o. vancomycin, especially while on antibiotics    Please call with questions. Please send Epic secure chat with any questions.  Dr. Cochran on ID service on Monday    Antibiotics (From admission, onward)      Start     Stop Route Frequency Ordered    04/25/24 2300  vancomycin 125 mg/5 mL oral solution 125 mg  (C. difficile Infection (CDI) Treatment Order Panel)         05/05/24 2359 PER G TUBE Every 6 hours 04/25/24 1831    04/25/24 2100  mupirocin 2 % ointment         04/30/24 2059 Nasl 2 times daily 04/25/24 1905    04/25/24 1945  meropenem 500 mg in sodium chloride 0.9 % 100 mL IVPB (ready to mix system)         -- IV Every 12 hours (non-standard times) 04/25/24 1834          Antifungals (From admission, onward)      None           Antivirals (From admission, onward)      None            HPI      Steff Johnson is a 64 y.o. female with history of CVA complicated by right hemiparesis and aphasia.  She has a PEG tube for feeding.  Also COPD, diabetes mellitus and hypertension.  She has a nursing home resident.  Brought to the ED for/25/24 with dark brown fluid around the PEG tube and also diarrhea.  She was febrile with T-max 102.3°, hypotensive hypoxic.  WBC 28 K, creatinine 2.5, lactic acid elevated.  Chest x-ray with no acute infiltrates.  UA abnormal with> 100 WBC.  Stool C diff antigen positive.  Stool C diff PCR and toxin assay negative.  She was admitted ICU, placed on IV fluids and antibiotics.  She had a drop in hematocrit and received PRBC x1. Current WBC 24 K, creatinine 3.1.    Antibiotics   Zosyn:  04/25/2024 x1 dose   Metronidazole:  04/25/2024   Oral vancomycin:  04/25/2024-  Meropenem:  04/25/2024-  IV vancomycin:  04/25/2024-    Microbiology   Blood culture 04/25/2024:  Staphylococcus epidermidis 1/4 bottles   Urine culture 04/25/2024:  GNR x2  Stool C diff assay for/25/24:  Antigen positive, PCR negative, toxin negative    Outdoor activities:  She is bed-bound  Travel:  No recent  travel  Implants:  PG tube  Antibiotic history:  Reviewed    Social History  Marital Status: Single  Alcohol History:  reports no history of alcohol use.  Tobacco History:  reports that she has never smoked. She has never used smokeless tobacco.  Drug History:  reports no history of drug use.    Review of patient's allergies indicates:  No Known Allergies  Past Medical History:   Diagnosis Date    Arthritis     Complete tear of left rotator cuff 11/09/2017    COPD (chronic obstructive pulmonary disease)     COVID-19 infection 07/02/2021    Diabetes mellitus     H/o Cerebrovascular accident (CVA) of left pontine structure 12/12/2019    Hypertension     Personal history of colonic polyps     Stroke     Wears glasses      Past Surgical History:   Procedure Laterality Date    COLONOSCOPY N/A 4/11/2017    Procedure: COLONOSCOPY;  Surgeon: Jared Antonio MD;  Location: [facility];  Service: Endoscopy;  Laterality: N/A;    CYSTOSCOPY W/ RETROGRADES N/A 12/14/2023    Procedure: CYSTOSCOPY, WITH RETROGRADE PYELOGRAM;  Surgeon: Sergio Soria MD;  Location: [facility];  Service: Urology;  Laterality: N/A;    ECHOCARDIOGRAM,TRANSESOPHAGEAL N/A 12/12/2023    Procedure: Transesophageal echo (GT) intra-procedure log documentation;  Surgeon: Antoine Rivera MD;  Location: [facility] CATH/EP LAB;  Service: Cardiology;  Laterality: N/A;    ESOPHAGOGASTRODUODENOSCOPY N/A 2/25/2024    Procedure: EGD (ESOPHAGOGASTRODUODENOSCOPY);  Surgeon: Alexandru May MD;  Location: [facility];  Service: Endoscopy;  Laterality: N/A;    ESOPHAGOGASTRODUODENOSCOPY N/A 4/26/2024    Procedure: EGD (ESOPHAGOGASTRODUODENOSCOPY);  Surgeon: Julien Escobar III, MD;  Location: [facility];  Service: Endoscopy;  Laterality: N/A;    HYSTERECTOMY      INSERTION OF CATHETER N/A 12/14/2023    Procedure: INSERTION, CATHETER;  Surgeon: Sergio Soria MD;  Location: [facility];  Service: Urology;  Laterality: N/A;    OOPHORECTOMY      SHOULDER SURGERY       R    TRANSESOPHAGEAL ECHOCARDIOGRAM WITH POSSIBLE CARDIOVERSION (GT W/ POSS CARDIOVERSION) N/A 5/4/2023    Procedure: Transesophageal echo (GT) intra-procedure log documentation;  Surgeon: Blade Phillip MD;  Location: [facility] CATH/EP LAB;  Service: Cardiology;  Laterality: N/A;     Family History   Problem Relation Name Age of Onset    Diabetes Mother      Asthma Mother      Hypertension Mother      Diabetes Father      Diabetes Brother      Hypertension Brother      Anemia Daughter      Glaucoma Neg Hx      Macular degeneration Neg Hx      Retinal detachment Neg Hx         SUBJECTIVE     Review of systems: Unable to obtain      OBJECTIVE   Temp:  [97.8 °F (36.6 °C)-101.8 °F (38.8 °C)] 99.1 °F (37.3 °C)  Pulse:  [] 99  Resp:  [14-25] 20  SpO2:  [86 %-100 %] 95 %  BP: (122-169)/(58-83) 157/73  Temp:  [97.8 °F (36.6 °C)-101.8 °F (38.8 °C)]   Temp: 99.1 °F (37.3 °C) (04/27/24 1100)  Pulse: 99 (04/27/24 1300)  Resp: 20 (04/27/24 1300)  BP: (!) 157/73 (04/27/24 1300)  SpO2: 95 % (04/27/24 1300)    Intake/Output Summary (Last 24 hours) at 4/27/2024 1351  Last data filed at 4/27/2024 1255  Gross per 24 hour   Intake 3108.34 ml   Output 1800 ml   Net 1308.34 ml       Physical Exam  General:  Elderly woman lying quietly in bed.  She is aphasic  HEENT:  No oral thrush--limited exam she does not open the mouth wide    Neck: Supple, no tenderness to palpation.  Cardiovascular: Regular rate and rhythm, no murmurs, no edema.    Respiratory:  Clear to auscultation bilaterally, no tachypnea or increased work of breathing.  Gastrointestinal:  Soft with active bowel sounds, no tenderness to palpation, no distention.  Peg tube in place  Genitourinary:  No suprapubic tenderness.  Peg tube in place  Musculoskeletal:  Muscle weakness, muscle wasting in lower extremities.  Skin:  Warm and dry, no obvious rashes.    Neuro:  Looks at me as I am examining, tracts.  She did not follow commands properly for me.  She seems to  understand  Psych:  She has a smile on her face.    VAD:  None  ISOLATION:  Contact isolation    Wounds:     Significant Labs: All pertinent labs within the past 24 hours have been reviewed.    CBC LAST 7  Recent Labs   Lab 04/25/24  1223 04/25/24  1701 04/25/24  1854 04/26/24  0405 04/26/24  0608 04/26/24  1124 04/26/24  1748 04/27/24  0014 04/27/24  0615 04/27/24  1121   WBC 28.38* 24.95*  --  24.03*  --   --   --   --  19.61*  --    RBC 3.41* 2.76*  --  3.38*  --   --   --   --  3.18*  --    HGB 8.8* 7.3*   < > 9.4* 10.0* 9.7* 10.9* 10.0* 8.8*  8.8* 9.8*   HCT 27.0* 22.3*  --  27.9*  --   --   --   --  25.5*  --    MCV 79* 81*  --  83  --   --   --   --  80*  --    MCH 25.8* 26.4*  --  27.8  --   --   --   --  27.7  --    MCHC 32.6 32.7  --  33.7  --   --   --   --  34.5  --    RDW 18.8* 18.7*  --  16.6*  --   --   --   --  16.9*  --    * 560*  --  482*  --   --   --   --  476*  --    MPV 10.7 10.7  --  10.1  --   --   --   --  10.4  --    GRAN 55.0 83.0*  20.7*  --  86.8*  20.9*  --   --   --   --  88.6*  17.4*  --    LYMPH 8.0* 9.3*  2.3  --  5.7*  1.4  --   --   --   --  4.4*  0.9*  --    MONO 5.0 5.8  1.5*  --  4.7  1.1*  --   --   --   --  4.6  0.9  --    BASO  --  0.05  --  0.09  --   --   --   --  0.05  --    NRBC 0 0  --  0  --   --   --   --  0  --     < > = values in this interval not displayed.       CHEMISTRY LAST 7  Recent Labs   Lab 04/25/24  1223 04/25/24  2146 04/26/24  0405 04/27/24  0615   * 135* 136 145   K 5.9* 4.1 4.3 3.2*   CL 96 104 101 111*   CO2 18* 11* 20* 22*   ANIONGAP 19* 20* 15 12   BUN 93* 84* 96* 69*   CREATININE 2.5* 2.7* 3.1* 1.9*   * 302* 241* 67*   CALCIUM 9.7 7.4* 8.3* 8.6*   MG 2.2  --  2.0 1.9   ALBUMIN 3.3*  --  2.7* 2.6*   PROT 8.0  --  6.6 6.5   ALKPHOS 89  --  68 84   ALT 15  --  10 16   AST 18  --  19 37   BILITOT 0.6  --  0.9 0.6       Estimated Creatinine Clearance: 22.4 mL/min (A) (based on SCr of 1.9 mg/dL (H)).    INFLAMMATORY/PROCAL   "LAST 7  No results for input(s): "PROCAL", "ESR", "CRP" in the last 168 hours.  No results found for: "ESR"  CRP   Date Value Ref Range Status   12/11/2023 55.1 (H) 0.0 - 8.2 mg/L Final   12/09/2023 143.3 (H) 0.0 - 8.2 mg/L Final   12/06/2023 293.0 (H) 0.0 - 8.2 mg/L Final   10/25/2023 92.6 (H) 0.0 - 8.2 mg/L Final   06/15/2023 0.65 <0.76 mg/dL Final   11/23/2021 3.21 (H) <0.76 mg/dL Final   07/04/2021 0.34 <0.76 mg/dL Final       PRIOR  MICROBIOLOGY:  Reviewed    Susceptibility data from last 90 days.  Collected Specimen Info Organism Amp/Sulbactam Cefepime Ceftriaxone Ciprofloxacin Ertapenem Gentamicin Levofloxacin Meropenem PIPERACILLIN/TAZO SUSCEPTIBILITY Tobramycin Trimeth/Sulfa   04/25/24 Urine Providencia stuartii  I  S  S  R  S  R  R  S  S  R  S     Lactose Fermenting              04/25/24 Blood Staphylococcus species              04/25/24 Blood Staphylococcus species              02/23/24 Blood from Peripheral, Antecubital, Left No growth after 5 days.              02/23/24 Blood from Peripheral, Antecubital, Left No growth after 5 days.              02/23/24 Urine Candida tropicalis                    LAST 7 DAYS MICROBIOLOGY   Microbiology Results (last 7 days)       Procedure Component Value Units Date/Time    Stool culture **cannot be ordered stat** [[id number]] Collected: 04/25/24 1526    Order Status: Completed Specimen: Stool Updated: 04/27/24 1341     Stool Culture No Salmonella,Shigella,Vibrio,Campylobacter.      No E coli 0157:H7 isolated.    Urine culture [[id number]]  (Abnormal)  (Susceptibility) Collected: 04/25/24 1434    Order Status: Completed Specimen: Urine Updated: 04/27/24 0748     Urine Culture, Routine GRAM NEGATIVE MARLEEN, LACTOSE   >100,000 cfu/ml  Identification and susceptibility pending        PROVIDENCIA STUARTII  > 100,000 cfu/ml      Narrative:      Specimen Source->Urine    Blood culture x two cultures. Draw prior to antibiotics. [[id number]]  (Abnormal) Collected: " 04/25/24 1223    Order Status: Completed Specimen: Blood Updated: 04/27/24 0738     Blood Culture, Routine Gram stain aer bottle: Gram positive cocci      Results called to and read back by:Octavio Rodriguez RN-ED;  04/26/2024      07:56 CJD      STAPHYLOCOCCUS SPECIES  For susceptibility see order #[id number]      Narrative:      Aerobic and anaerobic    Blood culture x two cultures. Draw prior to antibiotics. [[id number]]  (Abnormal) Collected: 04/25/24 1247    Order Status: Completed Specimen: Blood Updated: 04/27/24 0736     Blood Culture, Routine Gram stain aer bottle: Gram positive cocci      Results called to and read back by:Octavio Rodriguez RN-ED;  04/26/2024      07:35 CJD      STAPHYLOCOCCUS SPECIES  Identification and susceptibility pending      Narrative:      Aerobic and anaerobic    Rapid Organism ID by PCR (from Blood culture) [[id number]]  (Abnormal) Collected: 04/25/24 1247    Order Status: Completed Updated: 04/26/24 0826     Enterococcus faecalis Not Detected     Enterococcus faecium Not Detected     Listeria monocytogenes Not Detected     Staphylococcus spp. Detected     Staphylococcus aureus Not Detected     Staphylococcus epidermidis Not Detected     Staphylococcus lugdunensis Not Detected     Streptococcus species Not Detected     Streptococcus agalactiae Not Detected     Streptococcus pneumoniae Not Detected     Streptococcus pyogenes Not Detected     Acinetobacter calcoaceticus/baumannii complex Not Detected     Bacteroides fragilis Not Detected     Enterobacterales Not Detected     Enterobacter cloacae complex Not Detected     Escherichia coli Not Detected     Klebsiella aerogenes Not Detected     Klebsiella oxytoca Not Detected     Klebsiella pneumoniae group Not Detected     Proteus Not Detected     Salmonella sp Not Detected     Serratia marcescens Not Detected     Haemophilus influenzae Not Detected     Neisseria meningtidis Not Detected     Pseudomonas aeruginosa Not Detected      Stenotrophomonas maltophilia Not Detected     Candida albicans Not Detected     Candida auris Not Detected     Candida glabrata Not Detected     Candida krusei Not Detected     Candida parapsilosis Not Detected     Candida tropicalis Not Detected     Cryptococcus neoformans/gattii Not Detected     CTX-M (ESBL ) Test not applicable     IMP (Carbapenem resistant) Test not applicable     KPC resistance gene (Carbapenem resistant) Test not applicable     mcr-1  Test not applicable     mec A/C  Test not applicable     mec A/C and MREJ (MRSA) gene Test not applicable     NDM (Carbapenem resistant) Test not applicable     OXA-48-like (Carbapenem resistant) Test not applicable     van A/B (VRE gene) Test not applicable     VIM (Carbapenem resistant) Test not applicable    Narrative:      Aerobic and anaerobic    C Diff Toxin by PCR [[id number]] Collected: 04/25/24 1526    Order Status: Completed Updated: 04/25/24 2104     C. diff PCR Negative    Clostridium difficile EIA [[id number]]  (Abnormal) Collected: 04/25/24 1526    Order Status: Completed Specimen: Stool Updated: 04/25/24 1624     C. diff Antigen Positive     C difficile Toxins A+B, EIA Negative     Comment: Testing not recommended for children <24 months old.             CURRENT/PREVIOUS VISIT EKG  Results for orders placed or performed during the hospital encounter of 04/25/24   EKG 12-lead    Collection Time: 04/25/24 12:25 PM   Result Value Ref Range    QRS Duration 70 ms    [facility] QTC Calculation 454 ms    Narrative    Test Reason : A41.9,    Vent. Rate : 104 BPM     Atrial Rate : 104 BPM     P-R Int : 138 ms          QRS Dur : 070 ms      QT Int : 346 ms       P-R-T Axes : 056 029 078 degrees     QTc Int : 454 ms    Sinus tachycardia  Cannot rule out Anterior infarct ,age undetermined  Abnormal ECG  When compared with ECG of 23-FEB-2024 14:49,  Criteria for Inferior infarct are no longer Present  Confirmed by Marjan PALOMO, Blade NICHOLE ([id number]) on 4/25/2024  8:15:12 PM    Referred By: [email or web address]   SELF           Confirmed By:Blade Phillip MD     Significant Imaging: I have reviewed all relevant and available imaging results/findings within the past 24 hours.    Ariana Calvo MD  Date of Service: 04/27/2024      This note was created using [employer] Modal voice recognition software that occasionally misinterpreted phrases or words.

## 2024-04-28 LAB
ALBUMIN SERPL BCP-MCNC: 2.9 G/DL (ref 3.5–5.2)
ALP SERPL-CCNC: 152 U/L (ref 55–135)
ALT SERPL W/O P-5'-P-CCNC: 86 U/L (ref 10–44)
ANION GAP SERPL CALC-SCNC: 10 MMOL/L (ref 8–16)
ANISOCYTOSIS BLD QL SMEAR: SLIGHT
AST SERPL-CCNC: 102 U/L (ref 10–40)
BASOPHILS # BLD AUTO: 0.13 K/UL (ref 0–0.2)
BASOPHILS NFR BLD: 0.5 % (ref 0–1.9)
BILIRUB SERPL-MCNC: 0.6 MG/DL (ref 0.1–1)
BUN SERPL-MCNC: 56 MG/DL (ref 8–23)
CALCIUM SERPL-MCNC: 9.2 MG/DL (ref 8.7–10.5)
CHLORIDE SERPL-SCNC: 109 MMOL/L (ref 95–110)
CO2 SERPL-SCNC: 25 MMOL/L (ref 23–29)
CREAT SERPL-MCNC: 1.4 MG/DL (ref 0.5–1.4)
DIFFERENTIAL METHOD BLD: ABNORMAL
EOSINOPHIL # BLD AUTO: 0.1 K/UL (ref 0–0.5)
EOSINOPHIL NFR BLD: 0.3 % (ref 0–8)
ERYTHROCYTE [DISTWIDTH] IN BLOOD BY AUTOMATED COUNT: 17.5 % (ref 11.5–14.5)
EST. GFR  (NO RACE VARIABLE): 41.5 ML/MIN/1.73 M^2
GLUCOSE SERPL-MCNC: 148 MG/DL (ref 70–110)
GLUCOSE SERPL-MCNC: 152 MG/DL (ref 70–110)
GLUCOSE SERPL-MCNC: 175 MG/DL (ref 70–110)
GLUCOSE SERPL-MCNC: 244 MG/DL (ref 70–110)
HCT VFR BLD AUTO: 30.6 % (ref 37–48.5)
HGB BLD-MCNC: 10.3 G/DL (ref 12–16)
IMM GRANULOCYTES # BLD AUTO: 0.53 K/UL (ref 0–0.04)
IMM GRANULOCYTES NFR BLD AUTO: 1.9 % (ref 0–0.5)
LYMPHOCYTES # BLD AUTO: 1.4 K/UL (ref 1–4.8)
LYMPHOCYTES NFR BLD: 5 % (ref 18–48)
MAGNESIUM SERPL-MCNC: 1.7 MG/DL (ref 1.6–2.6)
MAGNESIUM SERPL-MCNC: 1.8 MG/DL (ref 1.6–2.6)
MCH RBC QN AUTO: 27.5 PG (ref 27–31)
MCHC RBC AUTO-ENTMCNC: 33.7 G/DL (ref 32–36)
MCV RBC AUTO: 82 FL (ref 82–98)
MONOCYTES # BLD AUTO: 1.6 K/UL (ref 0.3–1)
MONOCYTES NFR BLD: 5.7 % (ref 4–15)
NEUTROPHILS # BLD AUTO: 24.6 K/UL (ref 1.8–7.7)
NEUTROPHILS NFR BLD: 86.6 % (ref 38–73)
NRBC BLD-RTO: 0 /100 WBC
PLATELET # BLD AUTO: 498 K/UL (ref 150–450)
PLATELET BLD QL SMEAR: ABNORMAL
PMV BLD AUTO: 10.6 FL (ref 9.2–12.9)
POTASSIUM SERPL-SCNC: 3.4 MMOL/L (ref 3.5–5.1)
POTASSIUM SERPL-SCNC: 4.6 MMOL/L (ref 3.5–5.1)
PROT SERPL-MCNC: 7.4 G/DL (ref 6–8.4)
RBC # BLD AUTO: 3.74 M/UL (ref 4–5.4)
SODIUM SERPL-SCNC: 144 MMOL/L (ref 136–145)
TARGETS BLD QL SMEAR: ABNORMAL
WBC # BLD AUTO: 28.44 K/UL (ref 3.9–12.7)

## 2024-04-28 PROCEDURE — 84132 ASSAY OF SERUM POTASSIUM: CPT | Performed by: INTERNAL MEDICINE

## 2024-04-28 PROCEDURE — 21000000 HC CCU ICU ROOM CHARGE

## 2024-04-28 PROCEDURE — 27000221 HC OXYGEN, UP TO 24 HOURS

## 2024-04-28 PROCEDURE — 25000003 PHARM REV CODE 250: Performed by: HOSPITALIST

## 2024-04-28 PROCEDURE — 25000003 PHARM REV CODE 250: Performed by: INTERNAL MEDICINE

## 2024-04-28 PROCEDURE — 63600175 PHARM REV CODE 636 W HCPCS: Performed by: HOSPITALIST

## 2024-04-28 PROCEDURE — 36415 COLL VENOUS BLD VENIPUNCTURE: CPT | Performed by: HOSPITALIST

## 2024-04-28 PROCEDURE — 85025 COMPLETE CBC W/AUTO DIFF WBC: CPT | Performed by: HOSPITALIST

## 2024-04-28 PROCEDURE — 94761 N-INVAS EAR/PLS OXIMETRY MLT: CPT

## 2024-04-28 PROCEDURE — 83735 ASSAY OF MAGNESIUM: CPT | Performed by: HOSPITALIST

## 2024-04-28 PROCEDURE — C9113 INJ PANTOPRAZOLE SODIUM, VIA: HCPCS | Performed by: INTERNAL MEDICINE

## 2024-04-28 PROCEDURE — 63600175 PHARM REV CODE 636 W HCPCS: Performed by: INTERNAL MEDICINE

## 2024-04-28 PROCEDURE — 80053 COMPREHEN METABOLIC PANEL: CPT | Performed by: HOSPITALIST

## 2024-04-28 PROCEDURE — 83735 ASSAY OF MAGNESIUM: CPT | Mod: 91 | Performed by: INTERNAL MEDICINE

## 2024-04-28 PROCEDURE — 31720 CLEARANCE OF AIRWAYS: CPT

## 2024-04-28 PROCEDURE — 36415 COLL VENOUS BLD VENIPUNCTURE: CPT | Performed by: INTERNAL MEDICINE

## 2024-04-28 RX ORDER — AMLODIPINE BESYLATE 5 MG/1
10 TABLET ORAL DAILY
Status: DISCONTINUED | OUTPATIENT
Start: 2024-04-28 | End: 2024-05-08 | Stop reason: HOSPADM

## 2024-04-28 RX ADMIN — LEVETIRACETAM 500 MG: 5 INJECTION INTRAVENOUS at 08:04

## 2024-04-28 RX ADMIN — VANCOMYCIN HYDROCHLORIDE 125 MG: KIT at 12:04

## 2024-04-28 RX ADMIN — MEROPENEM 500 MG: 500 INJECTION, POWDER, FOR SOLUTION INTRAVENOUS at 07:04

## 2024-04-28 RX ADMIN — CLONIDINE HYDROCHLORIDE 0.1 MG: 0.1 TABLET ORAL at 07:04

## 2024-04-28 RX ADMIN — INSULIN ASPART 1 UNITS: 100 INJECTION, SOLUTION INTRAVENOUS; SUBCUTANEOUS at 09:04

## 2024-04-28 RX ADMIN — MUPIROCIN 1 G: 20 OINTMENT TOPICAL at 08:04

## 2024-04-28 RX ADMIN — POTASSIUM BICARBONATE 35 MEQ: 391 TABLET, EFFERVESCENT ORAL at 08:04

## 2024-04-28 RX ADMIN — PANTOPRAZOLE SODIUM 40 MG: 40 INJECTION, POWDER, FOR SOLUTION INTRAVENOUS at 09:04

## 2024-04-28 RX ADMIN — Medication 800 MG: at 09:04

## 2024-04-28 RX ADMIN — VANCOMYCIN HYDROCHLORIDE 125 MG: KIT at 05:04

## 2024-04-28 RX ADMIN — Medication 800 MG: at 06:04

## 2024-04-28 RX ADMIN — LEVETIRACETAM 500 MG: 5 INJECTION INTRAVENOUS at 09:04

## 2024-04-28 RX ADMIN — Medication 800 MG: at 10:04

## 2024-04-28 RX ADMIN — POTASSIUM BICARBONATE 35 MEQ: 391 TABLET, EFFERVESCENT ORAL at 06:04

## 2024-04-28 RX ADMIN — ACETAMINOPHEN 650 MG: 325 TABLET ORAL at 10:04

## 2024-04-28 RX ADMIN — PANTOPRAZOLE SODIUM 40 MG: 40 INJECTION, POWDER, FOR SOLUTION INTRAVENOUS at 08:04

## 2024-04-28 RX ADMIN — AMLODIPINE BESYLATE 10 MG: 5 TABLET ORAL at 10:04

## 2024-04-28 RX ADMIN — MUPIROCIN 1 G: 20 OINTMENT TOPICAL at 09:04

## 2024-04-28 NOTE — NURSING
...Nurses Note -- 4 Eyes      4/28/2024   0740 AM      Skin assessed during: Daily Assessment      [] No Altered Skin Integrity Present    []Prevention Measures Documented      [x] Yes- Altered Skin Integrity Present or Discovered   [] LDA Added if Not in Epic (Describe Wound)   [] New Altered Skin Integrity was Present on Admit and Documented in LDA   [x] Wound Image Taken    Wound Care Consulted? Yes    Attending Nurse:  Miracle Callahan RN     Second RN/Staff Member:  Miracle Saavedra RN

## 2024-04-28 NOTE — PROGRESS NOTES
INPATIENT NEPHROLOGY Progress Note  [facility]    Patient Name: Steff Johnson  Date: 04/28/2024    Reason for consultation: COURTNEY    Chief Complaint:   Chief Complaint   Patient presents with    GI Problem     Brown fluid being withdrawn from peg tube, vomiting brown fluid. Smells of feces. Since this morning. Pt is nonverbal       History of Present Illness:  63 yo female with PMH of CVA with known baseline confusion and aphasia who was sent to the ER because of black drainage coming from around the PEG tube site and having black vomit. Pt started to have projectile hematemesis in the ER. NGT was placed. Del Rosario was replaced after which pt developed hematuria. UA was grossly positive. C.diffi antigen is positive. CT C/A/P showed scattered nonspecific bronchiolitis in both lungs, with right lower lobes opacities potentially reflecting chronic bronchopneumonia, given similar appearance to CT of 12/11/2023. Consulted for COURTNEY.    Interval History:  4/26- febrile and hypotensive, at admission, on 3L NC, UOP 500cc, Hb 6.2- got 2u of blood- Hb improved  4/27- went for EGD yest- febrile, BP stable, on 4L NC, UOP 1.5L  4/28- low grade T, BP not high (got clonidine overnight), on 2L NC, UOP 2.3L, leukocytosis is worse, renal function is better, new transaminitis     Plan of Care:    Assessment:  COURTNEY  Acute GIB  C diff diarrhea  Hypernatremia/Hypokalemia/Acidosis (lactic)  HypoMg  Hx of HTN    Plan:    - suspect COURTNEY due to hemodynamic injury from hypotension, GI losses, and ABLA  - she is s/p IVFs, antbx and is s/p blood transfusion  - renal function is improved- nonoliguric  - can stop IVFs  - got K, Mg repletion this AM  - acidosis is resolved  - dose meds for CrCl 30-60  - no nsaids or IV contrast- strict ins/outs via del rosario  - resume norvasc- hold losartan, lasix today    Thank you for allowing us to participate in this patient's care. We will continue to follow.    Vital Signs:  Temp Readings from Last 3  Encounters:   04/28/24 99.8 °F (37.7 °C) (Axillary)   02/29/24 98.9 °F (37.2 °C) (Oral)   02/14/24 98.5 °F (36.9 °C) (Oral)       Pulse Readings from Last 3 Encounters:   04/28/24 86   04/26/24 101   02/29/24 74       BP Readings from Last 3 Encounters:   04/28/24 (!) 143/76   04/26/24 (!) 156/83   02/29/24 139/74       Weight:  Wt Readings from Last 3 Encounters:   04/26/24 53.5 kg (118 lb)   02/23/24 54.2 kg (119 lb 7.8 oz)   02/14/24 61.7 kg (136 lb)       INS/OUTS:  I/O last 3 completed shifts:  In: 3028.3 [I.V.:2558.3; NG/GT:270; IV Piggyback:200]  Out: 4175 [Urine:3625; Stool:550]  I/O this shift:  In: 60 [NG/GT:60]  Out: -     Medications:  Scheduled Meds:  Current Facility-Administered Medications   Medication Dose Route Frequency    insulin aspart U-100  0-5 Units Subcutaneous Q4H    insulin detemir U-100  15 Units Subcutaneous QHS    levETIRAcetam (Keppra) IV (PEDS and ADULTS)  500 mg Intravenous Q12H    meropenem IV (PEDS and ADULTS)  500 mg Intravenous Q12H    mupirocin   Nasal BID    pantoprazole  40 mg Intravenous BID    scopolamine  1 patch Transdermal Q3 Days    vancomycin  125 mg Per G Tube Q6H     Continuous Infusions:  Current Facility-Administered Medications   Medication Dose Route Frequency Last Rate Last Admin     PRN Meds:.  Current Facility-Administered Medications:     0.9%  NaCl infusion (for blood administration), , Intravenous, Q24H PRN    acetaminophen, 650 mg, Oral, Q4H PRN    aluminum-magnesium hydroxide-simethicone, 30 mL, Oral, QID PRN    [COMPLETED] calcium gluconate IVPB, 1 g, Intravenous, ED 1 Time **AND** calcium gluconate IVPB, 1 g, Intravenous, Q10 Min PRN    cloNIDine, 0.1 mg, Oral, Q8H PRN    dextrose 50%, 12.5 g, Intravenous, PRN    dextrose 50%, 25 g, Intravenous, PRN    glucagon (human recombinant), 1 mg, Intramuscular, PRN    glucose, 16 g, Oral, PRN    glucose, 24 g, Oral, PRN    HYDROcodone-acetaminophen, 1 tablet, Oral, Q6H PRN    magnesium oxide, 800 mg, Oral, PRN     magnesium oxide, 800 mg, Oral, PRN    melatonin, 6 mg, Oral, Nightly PRN    naloxone, 0.02 mg, Intravenous, PRN    ondansetron, 4 mg, Intravenous, Q6H PRN    potassium bicarbonate, 35 mEq, Oral, PRN    potassium bicarbonate, 50 mEq, Oral, PRN    potassium bicarbonate, 60 mEq, Oral, PRN    potassium, sodium phosphates, 2 packet, Oral, PRN    potassium, sodium phosphates, 2 packet, Oral, PRN    potassium, sodium phosphates, 2 packet, Oral, PRN    sodium chloride 0.9%, 10 mL, Intravenous, Q12H PRN  No current facility-administered medications on file prior to encounter.     Current Outpatient Medications on File Prior to Encounter   Medication Sig Dispense Refill    allopurinoL (ZYLOPRIM) 100 MG tablet 100 mg by Per G Tube route once daily.      amLODIPine (NORVASC) 10 MG tablet 1 tablet (10 mg total) by Per G Tube route once daily.      apixaban (ELIQUIS) 2.5 mg Tab 2.5 mg by Per G Tube route 2 (two) times daily.      ascorbic acid, vitamin C, (VITAMIN C) 500 MG tablet 500 mg by Per G Tube route 2 (two) times daily.      atorvastatin (LIPITOR) 80 MG tablet 1 tablet (80 mg total) by Per G Tube route every evening. 30 tablet 0    folic acid (FOLVITE) 1 MG tablet 1 tablet by Per G Tube route every morning.      furosemide (LASIX) 40 MG tablet 40 mg by Per G Tube route once daily.      HUMALOG KWIKPEN INSULIN 100 unit/mL pen Inject 0-20 Units into the skin As instructed (inject 4 times daily per sliding scale). 0 -149 = 0 units  150 - 199 = 4 units  200 - 249 = 8 units  250 - 299 = 12 units  300 - 349 = 16 units  350 - 1000 = 20 units Call MD      insulin (LANTUS SOLOSTAR U-100 INSULIN) glargine 100 units/mL SubQ pen Inject 35 Units into the skin once daily.      insulin lispro 100 unit/mL injection Inject 2 Units into the skin 3 (three) times daily before meals. 07:30  11:30  16:30      latanoprost 0.005 % ophthalmic solution Place 1 drop into both eyes once daily. (Patient taking differently: Place 1 drop into both eyes  "every evening.) 7.5 mL 6    levETIRAcetam (KEPPRA) 100 mg/mL Soln 500 mg by Per G Tube route 2 (two) times daily.      losartan (COZAAR) 100 MG tablet 1 tablet (100 mg total) by Per G Tube route once daily.      magnesium oxide (MAG-OX) 400 mg (241.3 mg magnesium) tablet 400 mg by Per G Tube route once daily.      metFORMIN (GLUCOPHAGE) 1000 MG tablet Take 1,000 mg by mouth 2 (two) times daily.      metoclopramide HCl (REGLAN) 5 mg/5 mL Soln 10 mg by Per G Tube route 3 (three) times daily.      metoprolol tartrate (LOPRESSOR) 25 MG tablet 25 mg by Per G Tube route 2 (two) times daily.      multivitamin (THERAGRAN) per tablet 1 tablet by Per G Tube route once daily.      omeprazole (PRILOSEC) 20 MG capsule Take 20 mg by mouth once daily. Per G-Tube      polyethylene glycol (GLYCOLAX) 17 gram PwPk 17 g by Per G Tube route once daily.      thiamine (VITAMIN B-1) 100 MG tablet 100 mg by Per G Tube route once daily.      zinc sulfate (ZINC-220 ORAL) 1 tablet by PEG Tube route Daily.      aspirin 81 MG Chew 1 tablet (81 mg total) by Per G Tube route once daily.      baclofen (LIORESAL) 5 mg Tab tablet 5 mg by Per G Tube route every 8 (eight) hours as needed (muscle spasms).      cloNIDine (CATAPRES) 0.1 MG tablet 1 tablet (0.1 mg total) by Per G Tube route 3 (three) times daily. 90 tablet 11    tiotropium (SPIRIVA) 18 mcg inhalation capsule Inhale 1 capsule (18 mcg total) into the lungs once daily. Controller 30 capsule 0    [DISCONTINUED] blood-glucose meter (TRUE METRIX GLUCOSE METER) kit Use as instructed 1 each 0    [DISCONTINUED] lancing device (TRUEDRAW LANCING DEVICE) Misc Inject 1 Device into the skin 2 (two) times daily. 1 each 3     Review of Systems:  Neg    Physical Exam:  BP (!) 143/76   Pulse 86   Temp 99.8 °F (37.7 °C) (Axillary)   Resp 19   Ht 4' 11" (1.499 m)   Wt 53.5 kg (118 lb)   SpO2 (!) 94%   BMI 23.83 kg/m²     General Appearance:    Ill   Head:    Normocephalic, atraumatic   Eyes:    Closed  "   Mouth:   Dry MM   Back:     Cannot assess   Lungs:     Clear    Heart:    Tachy   Abdomen:     Soft, non-tender, non-distended   Extremities:   Warm and well perfused   MSK:   No joint or muscle swelling, tenderness or deformity   Skin:   Skin color, texture, turgor normal, no rashes or lesions   Neurologic/Psychiatric:   Cannot assess     Results:  Lab Results   Component Value Date     04/28/2024    K 3.4 (L) 04/28/2024     04/28/2024    CO2 25 04/28/2024    BUN 56 (H) 04/28/2024    CREATININE 1.4 04/28/2024    CALCIUM 9.2 04/28/2024    ANIONGAP 10 04/28/2024    ESTGFRAFRICA 93 09/01/2023    EGFRNONAA 59.7 (A) 05/20/2022       Lab Results   Component Value Date    CALCIUM 9.2 04/28/2024    PHOS 3.8 04/25/2024       Recent Labs   Lab 04/28/24  0455   WBC 28.44*   RBC 3.74*   HGB 10.3*   HCT 30.6*   *   MCV 82   MCH 27.5   MCHC 33.7       I have personally reviewed pertinent radiological imaging and reports.    I have spent > 35 minutes providing care for this patient for the above diagnoses. These services have included chart/data/imaging review, evaluation, exam, formulation of plan, , note preparation, and discussions with staff involved in this patient's care.    Alejandro Alvarenga MD MPH  [facility] [address] [address]  [phone] (p)  [phone] (f)

## 2024-04-28 NOTE — NURSING
Nurses Note -- 4 Eyes      4/27/2024   10:55 PM      Skin assessed during: Daily Assessment      [] No Altered Skin Integrity Present    []Prevention Measures Documented      [x] Yes- Altered Skin Integrity Present or Discovered   [] LDA Added if Not in Epic (Describe Wound)   [x] New Altered Skin Integrity was Present on Admit and Documented in LDA   [x] Wound Image Taken    Wound Care Consulted? Yes    Attending Nurse:  Lauren GARCIA    Second RN/Staff Member:  DONE

## 2024-04-28 NOTE — CLINICAL REVIEW
Chart reviewed  Febrile  Cultures updated as below        Catheter associated UTI.    Catheter changed on 04/25/24.Prior ESBL  Cx with Procidentia,  Klebsiella pneumoniae CRE  Dc meropenem ant start avycaz     Staphylococcus species bacteremia.  Probable contaminant.  Await ID for significance. Meropenem should cover. Follow repeat blood cx    . Positive C diff antigen with negative PCR and toxin assay.  This may not be toxigenic C diff infection.  No colitis on CT abdomen.  Even if C diff toxin EIA  is negative, there is benefit to treating it    COURTNEY.  Creatinine improved to 0.7--3.1--1.9--1.4.  Management as per hospitalist.     Anemia with hematocrit drop.  Hemoglobin 6.2--10.9--9.8 As per hospitalist, GI/ EGD    PMHx CVA, aphasic, dysphagia with PEG tube in place, chronic indwelling Mathur catheter, bed-bound status, nursing resident               Cesar ortiz Klebsiella pneumoniae esbl    CULTURE, URINE CULTURE, URINE   Amp/Sulbactam 16/8 mcg/mL Intermediate >16/8 mcg/mL Resistant   Ampicillin   >16 mcg/mL Resistant   Cefepime <=2 mcg/mL Sensitive >16 mcg/mL Resistant   Ceftriaxone <=1 mcg/mL Sensitive >32 mcg/mL Resistant   Ciprofloxacin >2 mcg/mL Resistant >2 mcg/mL Resistant   Ertapenem <=0.5 mcg/mL Sensitive >1 mcg/mL Resistant   Gentamicin <=4 mcg/mL Resistant <=4 mcg/mL Sensitive   Levofloxacin >4 mcg/mL Resistant 4 mcg/mL Intermediate   Meropenem <=1 mcg/mL Sensitive 8 mcg/mL Resistant   Nitrofurantoin   >64 mcg/mL Resistant   Piperacillin/Tazo <=16 mcg/mL Sensitive >64 mcg/mL Resistant   Tetracycline   >8 mcg/mL Resistant   Tobramycin 8 mcg/mL Resistant 8 mcg/mL Intermediate   Trimeth/Sulfa <=2/38 mcg/mL Sensitive >2/38 mcg/mL Resistant

## 2024-04-28 NOTE — PROGRESS NOTES
Patient: Steff Johnson     MRN: [id number]    [facility] ACCT #: [id number]    : 1960 Age: 64 y.o. Sex:female  Room: 2528 Bed: 2528-a    Admit Date: 2024   Pt. Type: IP- Inpatient    Admitting Physician: Celine Buck MD    Attending Physician: Carlos Evans MD     Date of service: 2024        Admission diagnosis  Sepsis [A41.9]    Hospital course:  66 years old female patient with history of CVA, aphasic, chronic encephalopathy, likely anoxic, dysphagia with PEG tube in place, chronic indwelling Mathur catheter, bed-bound status, nursing resident,  brought to the emergency room due to coffee-ground vomiting and also from PEG tube.  Patient has also been noted with loose stools.  NG tube was placed in the emergency room.  Stool study has been done which is positive for C difficile antigen.   CT scan of chest is suggestive of nonspecific bronchiolitis in both lungs, with right lower lobes opacities potentially reflecting chronic bronchopneumonia, similar to previous CT scan in 2023. Mathur catheter has been replaced in the ED. Rectal tube was also placed in the ED.  Patient has been started on empiric antibiotic therapy.  She has undergone EGD which showed esophagus with moderate-to-severe distal esophagitis with 3 cm have the hernia as well as nonerosive gastritis; put on pump inhibitor has been recommended.  Patient cleared to resume anticoagulation.  Patient has been placed on IV meropenem for management of sepsis due to indwelling Mathur catheter related UTI.  Urine culture is positive for Klebsiella pneumoniae ESBL and Providencia stuartii.  Blood cultures positive for staph . Stool study is positive for C difficile antigen as such patient has been placed on oral vancomycin      Subjective:   Nonverbal patient.  Patient with only low-grade fever today.    Review of systems   unobtainable    Medications    Current Facility-Administered Medications:     0.9%  NaCl infusion (for blood  administration), , Intravenous, Q24H PRN, Celine Buck MD    acetaminophen tablet 650 mg, 650 mg, Oral, Q4H PRN, Celine Buck MD, 650 mg at 04/27/24 1634    aluminum-magnesium hydroxide-simethicone 200-200-20 mg/5 mL suspension 30 mL, 30 mL, Oral, QID PRN, Celine Buck MD    amLODIPine tablet 10 mg, 10 mg, Per G Tube, Daily, Alejandro Alvarenga MD, 10 mg at 04/28/24 1011    [COMPLETED] calcium gluconate 1 g in NS IVPB (premixed), 1 g, Intravenous, ED 1 Time, Stopped at 04/25/24 1754 **AND** calcium gluconate 1 g in NS IVPB (premixed), 1 g, Intravenous, Q10 Min PRN, Fouzia Mcmahon NP    cloNIDine tablet 0.1 mg, 0.1 mg, Oral, Q8H PRN, Celine Buck MD, 0.1 mg at 04/28/24 0713    dextrose 50% injection 12.5 g, 12.5 g, Intravenous, PRN, Celine Buck MD, 12.5 g at 04/27/24 1147    dextrose 50% injection 25 g, 25 g, Intravenous, PRN, Celine Buck MD    glucagon (human recombinant) injection 1 mg, 1 mg, Intramuscular, PRN, Celine Buck MD    glucose chewable tablet 16 g, 16 g, Oral, PRN, Celine Buck MD    glucose chewable tablet 24 g, 24 g, Oral, PRN, Celine Buck MD    HYDROcodone-acetaminophen 5-325 mg per tablet 1 tablet, 1 tablet, Oral, Q6H PRN, Celine uBck MD, 1 tablet at 04/27/24 2022    insulin aspart U-100 pen 0-5 Units, 0-5 Units, Subcutaneous, Q4H, Celine Buck MD, 1 Units at 04/26/24 0606    insulin detemir U-100 (Levemir) pen 15 Units, 15 Units, Subcutaneous, QHS, Celine Buck MD, 15 Units at 04/27/24 2022    levETIRAcetam in NaCl (iso-os) IVPB 500 mg, 500 mg, Intravenous, Q12H, Celine Buck MD, Stopped at 04/28/24 0849    magnesium oxide tablet 800 mg, 800 mg, Oral, PRN, Celine Buck MD, 800 mg at 04/28/24 1011    magnesium oxide tablet 800 mg, 800 mg, Oral, PRN, Celine Buck MD    melatonin tablet 6 mg, 6 mg, Oral, Nightly PRN, Celine Buck MD, 6 mg at 04/27/24 2022    meropenem 500 mg in sodium chloride 0.9 % 100 mL IVPB (ready to mix system), 500 mg,  Intravenous, Q12H, Celine Buck MD, Stopped at 04/28/24 0811    mupirocin 2 % ointment, , Nasal, BID, Celine Buck MD, 1 g at 04/28/24 0819    naloxone 0.4 mg/mL injection 0.02 mg, 0.02 mg, Intravenous, PRN, Celine Buck MD    ondansetron injection 4 mg, 4 mg, Intravenous, Q6H PRN, Celine Buck MD    pantoprazole injection 40 mg, 40 mg, Intravenous, BID, Carlos Evans MD, 40 mg at 04/28/24 0822    potassium bicarbonate disintegrating tablet 35 mEq, 35 mEq, Oral, PRN, Celine Buck MD, 35 mEq at 04/28/24 0819    potassium bicarbonate disintegrating tablet 50 mEq, 50 mEq, Oral, PRN, Celine Buck MD    potassium bicarbonate disintegrating tablet 60 mEq, 60 mEq, Oral, PRN, Celine Buck MD    potassium, sodium phosphates 280-160-250 mg packet 2 packet, 2 packet, Oral, PRN, Celine Buck MD    potassium, sodium phosphates 280-160-250 mg packet 2 packet, 2 packet, Oral, PRN, Celine Buck MD    potassium, sodium phosphates 280-160-250 mg packet 2 packet, 2 packet, Oral, PRN, Celine Buck MD    scopolamine 1.3-1.5 mg (1 mg over 3 days) 1 patch, 1 patch, Transdermal, Q3 Days, Christopher Mahan MD, 1 patch at 04/27/24 2158    sodium chloride 0.9% flush 10 mL, 10 mL, Intravenous, Q12H PRN, Celine Buck MD    vancomycin 125 mg/5 mL oral solution 125 mg, 125 mg, Per G Tube, Q6H, Celine Buck MD, 125 mg at 04/28/24 1205     Last Recorded Vitals  Vitals:    04/28/24 1300   BP: (!) 146/70   Pulse: 86   Resp: 19   Temp:         Physical Exam  General:  Bed-bound elderly patient  HEENT:  Head is normocephalic atraumatic, PERRLA, EOMI, oral cavity moist  Neck:  Supple, no thyromegaly, trachea midline position, no JVD noted   Respiratory:  Less crackles bilaterally ; on oxygen via nasal cannula 2 L  Cardiovascular:  tachycardic, regular rhythm, S1-S2 normal, no murmurs or rubs or gallops noted  Abdomen:  PEG tube noted with tube feedings ongoing; rectal tube in place with semi liquid brownish stool    Genitourinary:   Mathur catheter in place    Skin:                 Musculoskeletal:  bed-bound; quadriplegia; spasticity with contractures in extremities  CNS:   awake ; noninteractive;  nonverbal; quadriplegia      Laboratory data:   Recent Labs   Lab 04/26/24  0405 04/26/24  0608 04/27/24  0615 04/27/24  1121 04/28/24  0455   WBC 24.03*  --  19.61*  --  28.44*   HGB 9.4*   < > 8.8*  8.8* 9.8* 10.3*   HCT 27.9*  --  25.5*  --  30.6*   *  --  476*  --  498*    < > = values in this interval not displayed.       Recent Labs   Lab 04/26/24  0405 04/27/24  0615 04/28/24  0455 04/28/24  1333    145 144  --    K 4.3 3.2* 3.4* 4.6    111* 109  --    CO2 20* 22* 25  --    BUN 96* 69* 56*  --    CREATININE 3.1* 1.9* 1.4  --    CALCIUM 8.3* 8.6* 9.2  --    PROT 6.6 6.5 7.4  --    BILITOT 0.9 0.6 0.6  --    ALKPHOS 68 84 152*  --    ALT 10 16 86*  --    AST 19 37 102*  --        Imaging studies:  X-Ray Chest AP Portable  Narrative: EXAMINATION:  XR CHEST AP PORTABLE    CLINICAL HISTORY:  SOB;    COMPARISON:  04/26/2024    FINDINGS:  Enteric tube has been removed.  Cardiomediastinal silhouette is stable.  Pleuroparenchymal opacity at the right lung base likely representing a combination of pleural effusion and underlying airspace disease, worsened compared to prior, noting differences in technique which could be confounding.  Subsegmental linear opacity left lung base suggestive of atelectasis.  No pneumothorax.  Impression: Small right pleural effusion and right basilar airspace disease, worsened compared to prior.    Subsegmental atelectasis left lung base.    Interval removal of enteric tube.    Electronically signed by: Preston Ham  Date:    04/27/2024  Time:    10:59       Assessement and Plan:  Active Hospital Problems    Diagnosis  POA    *GI hemorrhage [K92.2]  Yes    Quadriplegia [G82.50]  Yes     Chronic    Encephalopathy chronic [G93.49]  Yes     Chronic    Sacral decubitus ulcer [L89.159]  Yes      Chronic    Severe sepsis [A41.9, R65.20]  Yes    Hyperkalemia [E87.5]  Yes    Acute blood loss anemia [D62]  Yes    Complicated UTI (urinary tract infection) [N39.0]  Yes    Clostridium difficile infection [A49.8]  Yes    Hematuria [R31.9]  Yes    Hypertension [I10]  Yes    Type 2 diabetes mellitus with neurologic complication, with long-term current use of insulin [E11.49, Z79.4]  Not Applicable    Acute renal failure [N17.9]  Yes    Seizure [R56.9]  Yes    CVA (cerebral vascular accident) [I63.9]  Yes    Aphasia [R47.01]  Yes      Resolved Hospital Problems   No resolved problems to display.          Severe sepsis   Suspected due to complicated UTI and C difficile colitis   History of MDRO and ESBL   WBC:28-->24 -->19.6-->28.4  Lactic acid:  6.1----->3.3-->0.8     Positive C difficile antigen   Started  on meropenem and oral vancomycin   IV fluids initially; then discontinued    Urine culture:  Klebsiella ESBL; providencia stuartii   Blood culture:  staph species   Mathur catheter has been exchanged in the emergency room   Contact isolation precautions   ID consulted       Suspected upper GI bleed   80 mg IV pantoprazole given;  pantoprazole IV drip   Aspirin and Eliquis on hold   NG tube in place; low intermittent suction   GI consulted  EGD:  Moderately severe distal esophagitis; 3 cm how to hernia; nonerosive gastritis  Continue pantoprazole  Okay to resume anticoagulation     Acute blood loss anemia   Hemoglobin: 8.8--> 6.2-->10.0-->9.8-->10.3    Transfused 2 units of PRBC   Monitor hemoglobin/hematocrit     Acute renal failure   Likely due to loose stools and GI bleed   Creatinine: 0.8-->2.5-->3.1-->1.9-->1.4    Monitor closely       Diabetes mellitus   Glucose checks and supplemental insulin as needed   Get hemoglobin A1c   Currently on insulin detemir 15 units night     Hyperkalemia, resolved  K:  5.9-->4.3-->3.2-->4.6     Gross Hematuria    Traumatic Mathur placement as per report   Improving       History of seizure   On levetiracetam     History of CVA  Chronic encephalopathy, likely anoxic   Aphasia   Bed-bound  Quadriplegia   Aspirin and Eliquis on hold   Aspiration precautions     Dysphagia   Peg tube in place   Feedings to be resumed      Sacrococcygeal decubitus ulcers   Minimal skin breakdown in left leg and right foot   Frequent turning      Hypertension    Home antihypertensives on hold for now    Fluid overload on 4/27  Chest x-ray:  Small right pleural effusion and right basilar airspace disease, worsened compared to prior; subsegmental atelectasis left lung base  BNP: 223  IV fluid discontinued  Furosemide 40 mg IV x1 given  4/28:  Clinically improved on physical exam      DVT Prophylaxis:   SCDs      Code status: Full Code    Disposition: await clinical improvement; eventual discharge back to skilled nursing facility    Disclaimer:  The above note was dictated utilizing speech recognition software.  Efforts were made to minimize and correct any transcription errors.     Carlos Evans MD

## 2024-04-29 LAB
ALBUMIN SERPL BCP-MCNC: 2.7 G/DL (ref 3.5–5.2)
ALP SERPL-CCNC: 147 U/L (ref 55–135)
ALT SERPL W/O P-5'-P-CCNC: 60 U/L (ref 10–44)
ANION GAP SERPL CALC-SCNC: 6 MMOL/L (ref 8–16)
ANISOCYTOSIS BLD QL SMEAR: SLIGHT
AST SERPL-CCNC: 51 U/L (ref 10–40)
BACTERIA #/AREA URNS HPF: ABNORMAL /HPF
BACTERIA BLD CULT: ABNORMAL
BACTERIA UR CULT: ABNORMAL
BACTERIA UR CULT: ABNORMAL
BASOPHILS # BLD AUTO: 0.1 K/UL (ref 0–0.2)
BASOPHILS NFR BLD: 0.4 % (ref 0–1.9)
BILIRUB SERPL-MCNC: 0.4 MG/DL (ref 0.1–1)
BILIRUB UR QL STRIP: NEGATIVE
BUN SERPL-MCNC: 39 MG/DL (ref 8–23)
CALCIUM SERPL-MCNC: 8.8 MG/DL (ref 8.7–10.5)
CHLORIDE SERPL-SCNC: 110 MMOL/L (ref 95–110)
CLARITY UR: ABNORMAL
CO2 SERPL-SCNC: 26 MMOL/L (ref 23–29)
COLOR UR: YELLOW
CREAT SERPL-MCNC: 1 MG/DL (ref 0.5–1.4)
CRP SERPL-MCNC: >16 MG/DL
DIFFERENTIAL METHOD BLD: ABNORMAL
EOSINOPHIL # BLD AUTO: 0.4 K/UL (ref 0–0.5)
EOSINOPHIL NFR BLD: 1.3 % (ref 0–8)
ERYTHROCYTE [DISTWIDTH] IN BLOOD BY AUTOMATED COUNT: 18.2 % (ref 11.5–14.5)
EST. GFR  (NO RACE VARIABLE): >60 ML/MIN/1.73 M^2
GLUCOSE SERPL-MCNC: 190 MG/DL (ref 70–110)
GLUCOSE SERPL-MCNC: 206 MG/DL (ref 70–110)
GLUCOSE SERPL-MCNC: 209 MG/DL (ref 70–110)
GLUCOSE SERPL-MCNC: 237 MG/DL (ref 70–110)
GLUCOSE SERPL-MCNC: 279 MG/DL (ref 70–110)
GLUCOSE UR QL STRIP: NEGATIVE
HCT VFR BLD AUTO: 26 % (ref 37–48.5)
HGB BLD-MCNC: 8.6 G/DL (ref 12–16)
HGB UR QL STRIP: ABNORMAL
HYALINE CASTS #/AREA URNS LPF: 0 /LPF
IMM GRANULOCYTES # BLD AUTO: 0.99 K/UL (ref 0–0.04)
IMM GRANULOCYTES NFR BLD AUTO: 3.8 % (ref 0–0.5)
KETONES UR QL STRIP: NEGATIVE
LEUKOCYTE ESTERASE UR QL STRIP: ABNORMAL
LYMPHOCYTES # BLD AUTO: 1.6 K/UL (ref 1–4.8)
LYMPHOCYTES NFR BLD: 5.9 % (ref 18–48)
MAGNESIUM SERPL-MCNC: 1.6 MG/DL (ref 1.6–2.6)
MCH RBC QN AUTO: 27.5 PG (ref 27–31)
MCHC RBC AUTO-ENTMCNC: 33.1 G/DL (ref 32–36)
MCV RBC AUTO: 83 FL (ref 82–98)
MICROSCOPIC COMMENT: ABNORMAL
MONOCYTES # BLD AUTO: 2.1 K/UL (ref 0.3–1)
MONOCYTES NFR BLD: 7.9 % (ref 4–15)
NEUTROPHILS # BLD AUTO: 21.3 K/UL (ref 1.8–7.7)
NEUTROPHILS NFR BLD: 80.7 % (ref 38–73)
NITRITE UR QL STRIP: NEGATIVE
NRBC BLD-RTO: 0 /100 WBC
PH UR STRIP: >8 [PH] (ref 5–8)
PLATELET # BLD AUTO: 498 K/UL (ref 150–450)
PLATELET BLD QL SMEAR: ABNORMAL
PMV BLD AUTO: 10.5 FL (ref 9.2–12.9)
POTASSIUM SERPL-SCNC: 4.6 MMOL/L (ref 3.5–5.1)
PROT SERPL-MCNC: 6.8 G/DL (ref 6–8.4)
PROT UR QL STRIP: ABNORMAL
RBC # BLD AUTO: 3.13 M/UL (ref 4–5.4)
RBC #/AREA URNS HPF: 16 /HPF (ref 0–4)
SODIUM SERPL-SCNC: 142 MMOL/L (ref 136–145)
SP GR UR STRIP: 1.01 (ref 1–1.03)
UNIDENT CRYS URNS QL MICRO: 2
URN SPEC COLLECT METH UR: ABNORMAL
UROBILINOGEN UR STRIP-ACNC: NEGATIVE EU/DL
WBC # BLD AUTO: 26.35 K/UL (ref 3.9–12.7)
WBC #/AREA URNS HPF: >100 /HPF (ref 0–5)
WBC CLUMPS URNS QL MICRO: ABNORMAL
YEAST URNS QL MICRO: ABNORMAL

## 2024-04-29 PROCEDURE — 36415 COLL VENOUS BLD VENIPUNCTURE: CPT | Performed by: STUDENT IN AN ORGANIZED HEALTH CARE EDUCATION/TRAINING PROGRAM

## 2024-04-29 PROCEDURE — 94761 N-INVAS EAR/PLS OXIMETRY MLT: CPT

## 2024-04-29 PROCEDURE — 36415 COLL VENOUS BLD VENIPUNCTURE: CPT | Performed by: INTERNAL MEDICINE

## 2024-04-29 PROCEDURE — 27000221 HC OXYGEN, UP TO 24 HOURS

## 2024-04-29 PROCEDURE — 63600175 PHARM REV CODE 636 W HCPCS: Performed by: INTERNAL MEDICINE

## 2024-04-29 PROCEDURE — 85025 COMPLETE CBC W/AUTO DIFF WBC: CPT | Performed by: HOSPITALIST

## 2024-04-29 PROCEDURE — 81001 URINALYSIS AUTO W/SCOPE: CPT | Performed by: STUDENT IN AN ORGANIZED HEALTH CARE EDUCATION/TRAINING PROGRAM

## 2024-04-29 PROCEDURE — 25000003 PHARM REV CODE 250: Performed by: INTERNAL MEDICINE

## 2024-04-29 PROCEDURE — 63600175 PHARM REV CODE 636 W HCPCS: Mod: JZ,JG | Performed by: INTERNAL MEDICINE

## 2024-04-29 PROCEDURE — 83735 ASSAY OF MAGNESIUM: CPT | Performed by: HOSPITALIST

## 2024-04-29 PROCEDURE — 87086 URINE CULTURE/COLONY COUNT: CPT | Performed by: STUDENT IN AN ORGANIZED HEALTH CARE EDUCATION/TRAINING PROGRAM

## 2024-04-29 PROCEDURE — C9113 INJ PANTOPRAZOLE SODIUM, VIA: HCPCS | Performed by: INTERNAL MEDICINE

## 2024-04-29 PROCEDURE — 63600175 PHARM REV CODE 636 W HCPCS: Performed by: HOSPITALIST

## 2024-04-29 PROCEDURE — 94760 N-INVAS EAR/PLS OXIMETRY 1: CPT

## 2024-04-29 PROCEDURE — 25000003 PHARM REV CODE 250: Performed by: STUDENT IN AN ORGANIZED HEALTH CARE EDUCATION/TRAINING PROGRAM

## 2024-04-29 PROCEDURE — 25000003 PHARM REV CODE 250: Performed by: HOSPITALIST

## 2024-04-29 PROCEDURE — 12000002 HC ACUTE/MED SURGE SEMI-PRIVATE ROOM

## 2024-04-29 PROCEDURE — 87040 BLOOD CULTURE FOR BACTERIA: CPT | Performed by: INTERNAL MEDICINE

## 2024-04-29 PROCEDURE — 99221 1ST HOSP IP/OBS SF/LOW 40: CPT | Mod: ,,, | Performed by: FAMILY MEDICINE

## 2024-04-29 PROCEDURE — 80053 COMPREHEN METABOLIC PANEL: CPT | Performed by: HOSPITALIST

## 2024-04-29 PROCEDURE — 86140 C-REACTIVE PROTEIN: CPT | Performed by: STUDENT IN AN ORGANIZED HEALTH CARE EDUCATION/TRAINING PROGRAM

## 2024-04-29 RX ADMIN — VANCOMYCIN HYDROCHLORIDE 125 MG: KIT at 09:04

## 2024-04-29 RX ADMIN — ACETAMINOPHEN 650 MG: 325 TABLET ORAL at 05:04

## 2024-04-29 RX ADMIN — VANCOMYCIN HYDROCHLORIDE 125 MG: KIT at 05:04

## 2024-04-29 RX ADMIN — CEFTAZIDIME, AVIBACTAM 1.25 G: 2; .5 POWDER, FOR SOLUTION INTRAVENOUS at 11:04

## 2024-04-29 RX ADMIN — CLONIDINE HYDROCHLORIDE 0.1 MG: 0.1 TABLET ORAL at 09:04

## 2024-04-29 RX ADMIN — ACETAMINOPHEN 650 MG: 325 TABLET ORAL at 09:04

## 2024-04-29 RX ADMIN — AMLODIPINE BESYLATE 10 MG: 5 TABLET ORAL at 09:04

## 2024-04-29 RX ADMIN — MUPIROCIN 1 G: 20 OINTMENT TOPICAL at 09:04

## 2024-04-29 RX ADMIN — PANTOPRAZOLE SODIUM 40 MG: 40 INJECTION, POWDER, FOR SOLUTION INTRAVENOUS at 09:04

## 2024-04-29 RX ADMIN — INSULIN ASPART 2 UNITS: 100 INJECTION, SOLUTION INTRAVENOUS; SUBCUTANEOUS at 09:04

## 2024-04-29 RX ADMIN — VANCOMYCIN HYDROCHLORIDE 125 MG: KIT at 12:04

## 2024-04-29 RX ADMIN — LEVETIRACETAM 500 MG: 5 INJECTION INTRAVENOUS at 09:04

## 2024-04-29 RX ADMIN — Medication 800 MG: at 09:04

## 2024-04-29 RX ADMIN — CEFTAZIDIME, AVIBACTAM 0.94 G: 2; .5 POWDER, FOR SOLUTION INTRAVENOUS at 12:04

## 2024-04-29 RX ADMIN — CEFTAZIDIME, AVIBACTAM 1.25 G: 2; .5 POWDER, FOR SOLUTION INTRAVENOUS at 09:04

## 2024-04-29 RX ADMIN — Medication 800 MG: at 01:04

## 2024-04-29 RX ADMIN — CEFTAZIDIME, AVIBACTAM 1.25 G: 2; .5 POWDER, FOR SOLUTION INTRAVENOUS at 04:04

## 2024-04-29 RX ADMIN — INSULIN ASPART 3 UNITS: 100 INJECTION, SOLUTION INTRAVENOUS; SUBCUTANEOUS at 09:04

## 2024-04-29 RX ADMIN — INSULIN ASPART 2 UNITS: 100 INJECTION, SOLUTION INTRAVENOUS; SUBCUTANEOUS at 05:04

## 2024-04-29 NOTE — NURSING
Patient transferred to room 1120 via bed. Medications and chart sent with patient. Tele monitor applied, belongs placed at bedside. Daughter, Leonie Johnson, notified via telephone.

## 2024-04-29 NOTE — RESPIRATORY THERAPY
04/29/24 0747   Patient Assessment/Suction   Level of Consciousness (AVPU) alert   Respiratory Effort Unlabored   PRE-TX-O2   Device (Oxygen Therapy) nasal cannula   $ Is the patient on Low Flow Oxygen? Yes   Flow (L/min) (Oxygen Therapy) 2   SpO2 (!) 94 %   Pulse Oximetry Type Continuous   $ Pulse Oximetry - Multiple Charge Pulse Oximetry - Multiple   Pulse 82   Resp (!) 21

## 2024-04-29 NOTE — PROGRESS NOTES
Patient: Steff Johnson     MRN: [id number]    [facility] ACCT #: [id number]    : 1960 Age: 64 y.o. Sex:female  Room: 1120 Bed: 1120-a    Admit Date: 2024   Pt. Type: IP- Inpatient    Admitting Physician: Celine Buck MD    Attending Physician: Carlos Evans MD     Date of service: 2024        Admission diagnosis  Sepsis [A41.9]    Hospital course:  66 years old female patient with history of CVA, aphasic, chronic encephalopathy, likely anoxic, dysphagia with PEG tube in place, chronic indwelling Mathur catheter, bed-bound status, nursing resident,  brought to the emergency room due to coffee-ground vomiting and also from PEG tube.  Patient has also been noted with loose stools.  NG tube was placed in the emergency room.  Stool study has been done which is positive for C difficile antigen.   CT scan of chest is suggestive of nonspecific bronchiolitis in both lungs, with right lower lobes opacities potentially reflecting chronic bronchopneumonia, similar to previous CT scan in 2023. Mathur catheter has been replaced in the ED. Rectal tube was also placed in the ED.  Patient has been started on empiric antibiotic therapy.  She has undergone EGD which showed esophagus with moderate-to-severe distal esophagitis with 3 cm have the hernia as well as nonerosive gastritis; put on pump inhibitor has been recommended.  Patient cleared to resume anticoagulation.  Patient has been placed on IV meropenem for management of sepsis due to indwelling Mathur catheter related UTI.  Urine culture is positive for Klebsiella pneumoniae ESBL and Providencia stuartii.  Blood cultures positive for staph . Stool study is positive for C difficile antigen as such patient has been placed on oral vancomycin.      Subjective:   Nonverbal patient.  Patient with fever of 100.9 earlier this morning.    Review of systems   unobtainable    Medications    Current Facility-Administered Medications:     0.9%  NaCl infusion (for blood  administration), , Intravenous, Q24H PRN, Celine Buck MD    acetaminophen tablet 650 mg, 650 mg, Oral, Q4H PRN, Celine Buck MD, 650 mg at 04/29/24 0511    aluminum-magnesium hydroxide-simethicone 200-200-20 mg/5 mL suspension 30 mL, 30 mL, Oral, QID PRN, Celine Buck MD    amLODIPine tablet 10 mg, 10 mg, Per G Tube, Daily, Alejandro Alvarenga MD, 10 mg at 04/29/24 0906    [COMPLETED] calcium gluconate 1 g in NS IVPB (premixed), 1 g, Intravenous, ED 1 Time, Stopped at 04/25/24 1754 **AND** calcium gluconate 1 g in NS IVPB (premixed), 1 g, Intravenous, Q10 Min PRN, Fouzia Mcmahon NP    cefTAZidime-avibactam (AVYCAZ) 1.25 g in dextrose 5 % (D5W) 100 mL, 1.25 g, Intravenous, Q8H, Ariana Calvo MD, Stopped at 04/29/24 1152    cloNIDine tablet 0.1 mg, 0.1 mg, Oral, Q8H PRN, Celine Buck MD, 0.1 mg at 04/28/24 0713    dextrose 50% injection 12.5 g, 12.5 g, Intravenous, PRN, Celine Buck MD, 12.5 g at 04/27/24 1147    dextrose 50% injection 25 g, 25 g, Intravenous, PRN, Celine Buck MD    glucagon (human recombinant) injection 1 mg, 1 mg, Intramuscular, PRN, Celine Buck MD    glucose chewable tablet 16 g, 16 g, Oral, PRN, Celine Buck MD    glucose chewable tablet 24 g, 24 g, Oral, PRN, Celine Buck MD    HYDROcodone-acetaminophen 5-325 mg per tablet 1 tablet, 1 tablet, Oral, Q6H PRN, Celine Buck MD, 1 tablet at 04/27/24 2022    insulin aspart U-100 pen 0-5 Units, 0-5 Units, Subcutaneous, Q4H, Celine Buck MD, 2 Units at 04/29/24 0907    insulin detemir U-100 (Levemir) pen 15 Units, 15 Units, Subcutaneous, QHS, Celine Buck MD, 15 Units at 04/28/24 2118    levETIRAcetam in NaCl (iso-os) IVPB 500 mg, 500 mg, Intravenous, Q12H, Celine Buck MD, Stopped at 04/29/24 0937    magnesium oxide tablet 800 mg, 800 mg, Oral, PRN, Celine Buck MD, 800 mg at 04/29/24 1301    magnesium oxide tablet 800 mg, 800 mg, Oral, PRN, Celine Buck MD    melatonin tablet 6 mg, 6 mg, Oral, Nightly  MAGALI, Celine Buck MD, 6 mg at 04/27/24 2022    mupirocin 2 % ointment, , Nasal, BID, Celine Buck MD, 1 g at 04/29/24 0906    naloxone 0.4 mg/mL injection 0.02 mg, 0.02 mg, Intravenous, PRN, Celine Buck MD    ondansetron injection 4 mg, 4 mg, Intravenous, Q6H PRN, Celine Buck MD    pantoprazole injection 40 mg, 40 mg, Intravenous, BID, Carlos Evans MD, 40 mg at 04/29/24 0906    potassium bicarbonate disintegrating tablet 35 mEq, 35 mEq, Oral, Heber DE LOS SANTOS Mira S., MD, 35 mEq at 04/28/24 0819    potassium bicarbonate disintegrating tablet 50 mEq, 50 mEq, Oral, SHANDRANHeber Mira S., MD    potassium bicarbonate disintegrating tablet 60 mEq, 60 mEq, Oral, Heber DE LOS SANTOS Mira S., MD    potassium, sodium phosphates 280-160-250 mg packet 2 packet, 2 packet, Oral, PRAUDREY, Celine Buck MD    potassium, sodium phosphates 280-160-250 mg packet 2 packet, 2 packet, Oral, PRHeber VENTURA Mira S., MD    potassium, sodium phosphates 280-160-250 mg packet 2 packet, 2 packet, Oral, PRHeber VENTURA Mira S., MD    scopolamine 1.3-1.5 mg (1 mg over 3 days) 1 patch, 1 patch, Transdermal, Q3 Days, Christopher Mahan MD, 1 patch at 04/27/24 2158    sodium chloride 0.9% flush 10 mL, 10 mL, Intravenous, Q12H PRN, Celine Buck MD    vancomycin 125 mg/5 mL oral solution 125 mg, 125 mg, Per G Tube, Daily, Rae Ramirez MD, 125 mg at 04/29/24 0906     Last Recorded Vitals  Vitals:    04/29/24 1500   BP: (!) 167/82   Pulse: 88   Resp: 19   Temp:         Physical Exam  General:  Bed-bound elderly patient  HEENT:  Head is normocephalic atraumatic, PERRLA, EOMI, oral cavity moist  Neck:  Supple, no thyromegaly, trachea midline position, no JVD noted   Respiratory:  Less crackles bilaterally ; on oxygen via nasal cannula 2 L  Cardiovascular:  Normal rate; regular rhythm, S1-S2 normal, no murmurs or rubs or gallops noted  Abdomen:  PEG tube noted with tube feedings ongoing; rectal tube in place with semi liquid brownish  stool  Genitourinary:   Mathur catheter in place    Skin:                 Musculoskeletal:  bed-bound; quadriplegia; spasticity with contractures in extremities  CNS:   awake ; noninteractive;  nonverbal; quadriplegia      Laboratory data:   Recent Labs   Lab 04/27/24  0615 04/27/24  1121 04/28/24  0455 04/29/24  0628   WBC 19.61*  --  28.44* 26.35*   HGB 8.8*  8.8* 9.8* 10.3* 8.6*   HCT 25.5*  --  30.6* 26.0*   *  --  498* 498*       Recent Labs   Lab 04/27/24  0615 04/28/24  0455 04/28/24  1333 04/29/24  0628    144  --  142   K 3.2* 3.4* 4.6 4.6   * 109  --  110   CO2 22* 25  --  26   BUN 69* 56*  --  39*   CREATININE 1.9* 1.4  --  1.0   CALCIUM 8.6* 9.2  --  8.8   PROT 6.5 7.4  --  6.8   BILITOT 0.6 0.6  --  0.4   ALKPHOS 84 152*  --  147*   ALT 16 86*  --  60*   AST 37 102*  --  51*       Imaging studies:  X-Ray Chest AP Portable  Narrative: EXAMINATION:  XR CHEST AP PORTABLE    CLINICAL HISTORY:  SOB;    COMPARISON:  04/26/2024    FINDINGS:  Enteric tube has been removed.  Cardiomediastinal silhouette is stable.  Pleuroparenchymal opacity at the right lung base likely representing a combination of pleural effusion and underlying airspace disease, worsened compared to prior, noting differences in technique which could be confounding.  Subsegmental linear opacity left lung base suggestive of atelectasis.  No pneumothorax.  Impression: Small right pleural effusion and right basilar airspace disease, worsened compared to prior.    Subsegmental atelectasis left lung base.    Interval removal of enteric tube.    Electronically signed by: Preston Ham  Date:    04/27/2024  Time:    10:59       Assessement and Plan:  Active Hospital Problems    Diagnosis  POA    *GI hemorrhage [K92.2]  Yes    Quadriplegia [G82.50]  Yes     Chronic    Encephalopathy chronic [G93.49]  Yes     Chronic    Sacral decubitus ulcer [L89.159]  Yes     Chronic    Severe sepsis [A41.9, R65.20]  Yes    Hyperkalemia [E87.5]   Yes    Acute blood loss anemia [D62]  Yes    Complicated UTI (urinary tract infection) [N39.0]  Yes    Clostridium difficile infection [A49.8]  Yes    Hematuria [R31.9]  Yes    Hypertension [I10]  Yes    Type 2 diabetes mellitus with neurologic complication, with long-term current use of insulin [E11.49, Z79.4]  Not Applicable    Acute renal failure [N17.9]  Yes    Seizure [R56.9]  Yes    CVA (cerebral vascular accident) [I63.9]  Yes    Aphasia [R47.01]  Yes      Resolved Hospital Problems   No resolved problems to display.          Severe sepsis   Suspected due to complicated UTI and C difficile colitis   History of MDRO and ESBL   WBC:28-->24 -->19.6-->28.4-->26.3  Lactic acid:  6.1----->3.3-->0.8     Positive C difficile antigen   Started  on meropenem and oral vancomycin   IV fluids initially; then discontinued    Urine culture:  Klebsiella ESBL; providencia stuartii   Blood culture:  staph haemolyticus   Mathur catheter has been exchanged in the emergency room   Contact isolation precautions   ID consulted  4/28:  Meropenem changed to Avycaz     Suspected upper GI bleed   80 mg IV pantoprazole given;  pantoprazole IV drip   Aspirin and Eliquis on hold   NG tube in place; low intermittent suction   GI consulted  4/26:  EGD:  Moderately severe distal esophagitis; 3 cm how to hernia; nonerosive gastritis  Continue pantoprazole  Okay to resume anticoagulation     Acute blood loss anemia   Hemoglobin: 8.8--> 6.2-->10.0-->9.8-->8.6    Transfused 2 units of PRBC   Monitor hemoglobin/hematocrit     Acute renal failure   Likely due to loose stools and GI bleed   Creatinine: 0.8-->2.5-->3.1---->1.0    Monitor closely       Diabetes mellitus   Glucose checks and supplemental insulin as needed   Get hemoglobin A1c   Currently on insulin detemir 15 units night     Hyperkalemia, resolved  K:  5.9-->4.3-->3.2-->4.6     Gross Hematuria    Traumatic Mathur placement as per report   Improving      History of seizure   On  levetiracetam     History of CVA  Chronic encephalopathy, likely anoxic   Aphasia   Bed-bound  Quadriplegia   Aspirin and Eliquis on hold   Aspiration precautions     Dysphagia   Peg tube in place   Feedings to be resumed      Sacrococcygeal decubitus ulcer   Minimal skin breakdown in left leg and right foot   Frequent turning      Hypertension    Home antihypertensives on hold for now    Fluid overload on 4/27  Chest x-ray:  Small right pleural effusion and right basilar airspace disease, worsened compared to prior; subsegmental atelectasis left lung base  BNP: 223  IV fluid discontinued  Furosemide 40 mg IV x1 given  4/28:  Clinically improved on physical exam      DVT Prophylaxis:   SCDs      Code status: Full Code    Disposition: await clinical improvement; eventual discharge back to skilled nursing facility    Disclaimer:  The above note was dictated utilizing speech recognition software.  Efforts were made to minimize and correct any transcription errors.     Carlos Evans MD

## 2024-04-29 NOTE — PROGRESS NOTES
INPATIENT NEPHROLOGY Progress Note  [facility]    Patient Name: Steff Johnson  Date: 04/29/2024    Reason for consultation: COURTNEY    Chief Complaint:   Chief Complaint   Patient presents with    GI Problem     Brown fluid being withdrawn from peg tube, vomiting brown fluid. Smells of feces. Since this morning. Pt is nonverbal       History of Present Illness:  63 yo female with PMH of CVA with known baseline confusion and aphasia who was sent to the ER because of black drainage coming from around the PEG tube site and having black vomit. Pt started to have projectile hematemesis in the ER. NGT was placed. Del Rosario was replaced after which pt developed hematuria. UA was grossly positive. C.diffi antigen is positive. CT C/A/P showed scattered nonspecific bronchiolitis in both lungs, with right lower lobes opacities potentially reflecting chronic bronchopneumonia, given similar appearance to CT of 12/11/2023. Consulted for COURTNEY.    Interval History:  4/26- febrile and hypotensive, at admission, on 3L NC, UOP 500cc, Hb 6.2- got 2u of blood- Hb improved  4/27- went for EGD yest- febrile, BP stable, on 4L NC, UOP 1.5L  4/28- low grade T, BP not high (got clonidine overnight), on 2L NC, UOP 2.3L, leukocytosis is worse, renal function is better, new transaminitis   4/29  2.3L UOP recorded, Tmax 102.2, pressures stable.  Renal function improving, liver enzymes trended down.  K+ at goal,  Mg+ low, has PRN repletion orders.      Plan of Care:    Assessment:  COURTNEY  Acute GIB  C diff diarrhea  Hypernatremia/Hypokalemia/Acidosis (lactic)  HypoMg  Hx of HTN    Plan:    - suspect COURTNEY due to hemodynamic injury from hypotension, GI losses, and ABLA  - she is s/p IVFs, antbx and is s/p blood transfusion  - renal function is improved- nonoliguric  - now off IVFs  - K, Mg repletion per PRN orders  - acidosis is resolved  - dose meds for CrCl 30-60  - no nsaids or IV contrast- strict ins/outs via del rosario  - resumed norvasc  4/28- hold losartan, lasix today    Thank you for allowing us to participate in this patient's care. We will continue to follow.    Vital Signs:  Temp Readings from Last 3 Encounters:   04/29/24 99.4 °F (37.4 °C) (Oral)   02/29/24 98.9 °F (37.2 °C) (Oral)   02/14/24 98.5 °F (36.9 °C) (Oral)       Pulse Readings from Last 3 Encounters:   04/29/24 82   04/26/24 101   02/29/24 74       BP Readings from Last 3 Encounters:   04/29/24 (!) 147/70   04/26/24 (!) 156/83   02/29/24 139/74       Weight:  Wt Readings from Last 3 Encounters:   04/26/24 53.5 kg (118 lb)   02/23/24 54.2 kg (119 lb 7.8 oz)   02/14/24 61.7 kg (136 lb)       INS/OUTS:  I/O last 3 completed shifts:  In: 1907 [NG/GT:1907]  Out: 3450 [Urine:3100; Stool:350]  No intake/output data recorded.    Medications:  Scheduled Meds:  Current Facility-Administered Medications   Medication Dose Route Frequency    amLODIPine  10 mg Per G Tube Daily    ceftazidime-avibactam (AVYCAZ) IVPB  1.25 g Intravenous Q8H    insulin aspart U-100  0-5 Units Subcutaneous Q4H    insulin detemir U-100  15 Units Subcutaneous QHS    levETIRAcetam (Keppra) IV (PEDS and ADULTS)  500 mg Intravenous Q12H    mupirocin   Nasal BID    pantoprazole  40 mg Intravenous BID    scopolamine  1 patch Transdermal Q3 Days    vancomycin  125 mg Per G Tube Q6H     Continuous Infusions:  Current Facility-Administered Medications   Medication Dose Route Frequency Last Rate Last Admin     PRN Meds:.  Current Facility-Administered Medications:     0.9%  NaCl infusion (for blood administration), , Intravenous, Q24H PRN    acetaminophen, 650 mg, Oral, Q4H PRN    aluminum-magnesium hydroxide-simethicone, 30 mL, Oral, QID PRN    [COMPLETED] calcium gluconate IVPB, 1 g, Intravenous, ED 1 Time **AND** calcium gluconate IVPB, 1 g, Intravenous, Q10 Min PRN    cloNIDine, 0.1 mg, Oral, Q8H PRN    dextrose 50%, 12.5 g, Intravenous, PRN    dextrose 50%, 25 g, Intravenous, PRN    glucagon (human recombinant), 1 mg,  Intramuscular, PRN    glucose, 16 g, Oral, PRN    glucose, 24 g, Oral, PRN    HYDROcodone-acetaminophen, 1 tablet, Oral, Q6H PRN    magnesium oxide, 800 mg, Oral, PRN    magnesium oxide, 800 mg, Oral, PRN    melatonin, 6 mg, Oral, Nightly PRN    naloxone, 0.02 mg, Intravenous, PRN    ondansetron, 4 mg, Intravenous, Q6H PRN    potassium bicarbonate, 35 mEq, Oral, PRN    potassium bicarbonate, 50 mEq, Oral, PRN    potassium bicarbonate, 60 mEq, Oral, PRN    potassium, sodium phosphates, 2 packet, Oral, PRN    potassium, sodium phosphates, 2 packet, Oral, PRN    potassium, sodium phosphates, 2 packet, Oral, PRN    sodium chloride 0.9%, 10 mL, Intravenous, Q12H PRN  No current facility-administered medications on file prior to encounter.     Current Outpatient Medications on File Prior to Encounter   Medication Sig Dispense Refill    allopurinoL (ZYLOPRIM) 100 MG tablet 100 mg by Per G Tube route once daily.      amLODIPine (NORVASC) 10 MG tablet 1 tablet (10 mg total) by Per G Tube route once daily.      apixaban (ELIQUIS) 2.5 mg Tab 2.5 mg by Per G Tube route 2 (two) times daily.      ascorbic acid, vitamin C, (VITAMIN C) 500 MG tablet 500 mg by Per G Tube route 2 (two) times daily.      atorvastatin (LIPITOR) 80 MG tablet 1 tablet (80 mg total) by Per G Tube route every evening. 30 tablet 0    folic acid (FOLVITE) 1 MG tablet 1 tablet by Per G Tube route every morning.      furosemide (LASIX) 40 MG tablet 40 mg by Per G Tube route once daily.      HUMALOG KWIKPEN INSULIN 100 unit/mL pen Inject 0-20 Units into the skin As instructed (inject 4 times daily per sliding scale). 0 -149 = 0 units  150 - 199 = 4 units  200 - 249 = 8 units  250 - 299 = 12 units  300 - 349 = 16 units  350 - 1000 = 20 units Call MD      insulin (LANTUS SOLOSTAR U-100 INSULIN) glargine 100 units/mL SubQ pen Inject 35 Units into the skin once daily.      insulin lispro 100 unit/mL injection Inject 2 Units into the skin 3 (three) times daily  before meals. 07:30  11:30  16:30      latanoprost 0.005 % ophthalmic solution Place 1 drop into both eyes once daily. (Patient taking differently: Place 1 drop into both eyes every evening.) 7.5 mL 6    levETIRAcetam (KEPPRA) 100 mg/mL Soln 500 mg by Per G Tube route 2 (two) times daily.      losartan (COZAAR) 100 MG tablet 1 tablet (100 mg total) by Per G Tube route once daily.      magnesium oxide (MAG-OX) 400 mg (241.3 mg magnesium) tablet 400 mg by Per G Tube route once daily.      metFORMIN (GLUCOPHAGE) 1000 MG tablet Take 1,000 mg by mouth 2 (two) times daily.      metoclopramide HCl (REGLAN) 5 mg/5 mL Soln 10 mg by Per G Tube route 3 (three) times daily.      metoprolol tartrate (LOPRESSOR) 25 MG tablet 25 mg by Per G Tube route 2 (two) times daily.      multivitamin (THERAGRAN) per tablet 1 tablet by Per G Tube route once daily.      omeprazole (PRILOSEC) 20 MG capsule Take 20 mg by mouth once daily. Per G-Tube      polyethylene glycol (GLYCOLAX) 17 gram PwPk 17 g by Per G Tube route once daily.      thiamine (VITAMIN B-1) 100 MG tablet 100 mg by Per G Tube route once daily.      zinc sulfate (ZINC-220 ORAL) 1 tablet by PEG Tube route Daily.      aspirin 81 MG Chew 1 tablet (81 mg total) by Per G Tube route once daily.      baclofen (LIORESAL) 5 mg Tab tablet 5 mg by Per G Tube route every 8 (eight) hours as needed (muscle spasms).      cloNIDine (CATAPRES) 0.1 MG tablet 1 tablet (0.1 mg total) by Per G Tube route 3 (three) times daily. 90 tablet 11    tiotropium (SPIRIVA) 18 mcg inhalation capsule Inhale 1 capsule (18 mcg total) into the lungs once daily. Controller 30 capsule 0    [DISCONTINUED] blood-glucose meter (TRUE METRIX GLUCOSE METER) kit Use as instructed 1 each 0    [DISCONTINUED] lancing device (TRUEDRAW LANCING DEVICE) Misc Inject 1 Device into the skin 2 (two) times daily. 1 each 3     Review of Systems:  Neg    Physical Exam:  BP (!) 147/70   Pulse 82   Temp 99.4 °F (37.4 °C) (Oral)    "Resp (!) 21   Ht 4' 11" (1.499 m)   Wt 53.5 kg (118 lb)   SpO2 (!) 94%   BMI 23.83 kg/m²     General Appearance:    Ill   Head:    Normocephalic, atraumatic   Eyes:    Closed    Mouth:   Dry MM   Back:     Cannot assess   Lungs:     Clear    Heart:    Tachy   Abdomen:     Soft, non-tender, non-distended   Extremities:   Warm and well perfused   MSK:   No joint or muscle swelling, tenderness or deformity   Skin:   Skin color, texture, turgor normal, no rashes or lesions   Neurologic/Psychiatric:   Cannot assess     Results:  Lab Results   Component Value Date     04/29/2024    K 4.6 04/29/2024     04/29/2024    CO2 26 04/29/2024    BUN 39 (H) 04/29/2024    CREATININE 1.0 04/29/2024    CALCIUM 8.8 04/29/2024    ANIONGAP 6 (L) 04/29/2024    ESTGFRAFRICA 93 09/01/2023    EGFRNONAA 59.7 (A) 05/20/2022       Lab Results   Component Value Date    CALCIUM 8.8 04/29/2024    PHOS 3.8 04/25/2024       Recent Labs   Lab 04/29/24  0628   WBC 26.35*   RBC 3.13*   HGB 8.6*   HCT 26.0*   *   MCV 83   MCH 27.5   MCHC 33.1       I have personally reviewed pertinent radiological imaging and reports.    I have spent > 35 minutes providing care for this patient for the above diagnoses. These services have included chart/data/imaging review, evaluation, exam, formulation of plan, , note preparation, and discussions with staff involved in this patient's care.    Merline Gardner NP    [facility]  [address] [facility]  [place] [id number]  [phone] (p)  [phone] (f)  "

## 2024-04-29 NOTE — NURSING
Nurses Note -- 4 Eyes      4/29/2024   5:26 PM      Skin assessed during: Transfer      [] No Altered Skin Integrity Present    []Prevention Measures Documented      [x] Yes- Altered Skin Integrity Present or Discovered   [x] LDA Added if Not in Epic (Describe Wound)   [x] New Altered Skin Integrity was Present on Admit and Documented in LDA   [x] Wound Image Taken    Wound Care Consulted? Yes    Attending Nurse:  Edie Webb RN/Staff Member:  Rae

## 2024-04-29 NOTE — PLAN OF CARE
Problem: Infection  Goal: Absence of Infection Signs and Symptoms  Outcome: Progressing     Problem: Adult Inpatient Plan of Care  Goal: Plan of Care Review  Outcome: Progressing  Goal: Patient-Specific Goal (Individualized)  Outcome: Progressing  Goal: Absence of Hospital-Acquired Illness or Injury  Outcome: Progressing  Goal: Optimal Comfort and Wellbeing  Outcome: Progressing  Goal: Readiness for Transition of Care  Outcome: Progressing     Problem: Diabetes Comorbidity  Goal: Blood Glucose Level Within Targeted Range  Outcome: Progressing     Problem: Sepsis/Septic Shock  Goal: Optimal Coping  Outcome: Progressing  Goal: Absence of Bleeding  Outcome: Progressing  Goal: Blood Glucose Level Within Targeted Range  Outcome: Progressing  Goal: Absence of Infection Signs and Symptoms  Outcome: Progressing  Goal: Optimal Nutrition Intake  Outcome: Progressing     Problem: Acute Kidney Injury/Impairment  Goal: Fluid and Electrolyte Balance  Outcome: Progressing  Goal: Improved Oral Intake  Outcome: Progressing  Goal: Effective Renal Function  Outcome: Progressing     Problem: Wound  Goal: Optimal Coping  Outcome: Progressing  Goal: Optimal Functional Ability  Outcome: Progressing  Goal: Absence of Infection Signs and Symptoms  Outcome: Progressing  Goal: Improved Oral Intake  Outcome: Progressing  Goal: Optimal Pain Control and Function  Outcome: Progressing  Goal: Skin Health and Integrity  Outcome: Progressing  Goal: Optimal Wound Healing  Outcome: Progressing     Problem: Skin Injury Risk Increased  Goal: Skin Health and Integrity  Outcome: Progressing

## 2024-04-29 NOTE — NURSING
Nurses Note -- 4 Eyes      4/29/2024   11:09 AM      Skin assessed during: Daily Assessment      [] No Altered Skin Integrity Present    []Prevention Measures Documented      [x] Yes- Altered Skin Integrity Present or Discovered   [x] LDA Added if Not in Epic (Describe Wound)   [x] New Altered Skin Integrity was Present on Admit and Documented in LDA   [x] Wound Image Taken    Wound Care Consulted? Yes    Attending Nurse:  Miracle Sanchez RN    Second RN/Staff Member:  Jorden Rosenthal RN

## 2024-04-29 NOTE — NURSING
Nurses Note -- 4 Eyes      4/28/2024   10:39 PM      Skin assessed during: Q Shift Change      [] No Altered Skin Integrity Present    []Prevention Measures Documented      [x] Yes- Altered Skin Integrity Present or Discovered   [] LDA Added if Not in Epic (Describe Wound)   [] New Altered Skin Integrity was Present on Admit and Documented in LDA   [] Wound Image Taken    Wound pictures already taken and WC already consulted. No new wounds at this time.    Wound Care Consulted? Yes    Attending Nurse:  RADHA Evans    Second RN/Staff Member:  RADHA Cody

## 2024-04-30 PROBLEM — R31.9 HEMATURIA: Status: RESOLVED | Noted: 2024-04-25 | Resolved: 2024-04-30

## 2024-04-30 PROBLEM — Z86.73 HISTORY OF ISCHEMIC STROKE: Status: ACTIVE | Noted: 2024-04-25

## 2024-04-30 PROBLEM — L89.153 SACRAL DECUBITUS ULCER, STAGE III: Status: ACTIVE | Noted: 2023-10-25

## 2024-04-30 LAB
ADENOVIRUS: NOT DETECTED
ALBUMIN SERPL BCP-MCNC: 2.7 G/DL (ref 3.5–5.2)
ALP SERPL-CCNC: 165 U/L (ref 55–135)
ALT SERPL W/O P-5'-P-CCNC: 63 U/L (ref 10–44)
ANION GAP SERPL CALC-SCNC: 7 MMOL/L (ref 8–16)
ANISOCYTOSIS BLD QL SMEAR: SLIGHT
AST SERPL-CCNC: 47 U/L (ref 10–40)
BASOPHILS NFR BLD: 0 % (ref 0–1.9)
BILIRUB SERPL-MCNC: 0.3 MG/DL (ref 0.1–1)
BORDETELLA PARAPERTUSSIS (IS1001): NOT DETECTED
BORDETELLA PERTUSSIS (PTXP): NOT DETECTED
BUN SERPL-MCNC: 35 MG/DL (ref 8–23)
CALCIUM SERPL-MCNC: 8.8 MG/DL (ref 8.7–10.5)
CHLAMYDIA PNEUMONIAE: NOT DETECTED
CHLORIDE SERPL-SCNC: 109 MMOL/L (ref 95–110)
CO2 SERPL-SCNC: 24 MMOL/L (ref 23–29)
CORONAVIRUS 229E, COMMON COLD VIRUS: NOT DETECTED
CORONAVIRUS HKU1, COMMON COLD VIRUS: NOT DETECTED
CORONAVIRUS NL63, COMMON COLD VIRUS: NOT DETECTED
CORONAVIRUS OC43, COMMON COLD VIRUS: NOT DETECTED
CREAT SERPL-MCNC: 0.9 MG/DL (ref 0.5–1.4)
DIFFERENTIAL METHOD BLD: ABNORMAL
EOSINOPHIL NFR BLD: 2 % (ref 0–8)
ERYTHROCYTE [DISTWIDTH] IN BLOOD BY AUTOMATED COUNT: 18.6 % (ref 11.5–14.5)
EST. GFR  (NO RACE VARIABLE): >60 ML/MIN/1.73 M^2
FLUBV RNA NPH QL NAA+NON-PROBE: NOT DETECTED
GLUCOSE SERPL-MCNC: 230 MG/DL (ref 70–110)
GLUCOSE SERPL-MCNC: 238 MG/DL (ref 70–110)
GLUCOSE SERPL-MCNC: 247 MG/DL (ref 70–110)
GLUCOSE SERPL-MCNC: 251 MG/DL (ref 70–110)
GLUCOSE SERPL-MCNC: 365 MG/DL (ref 70–110)
HCT VFR BLD AUTO: 28.6 % (ref 37–48.5)
HGB BLD-MCNC: 9.2 G/DL (ref 12–16)
HPIV1 RNA NPH QL NAA+NON-PROBE: NOT DETECTED
HPIV2 RNA NPH QL NAA+NON-PROBE: NOT DETECTED
HPIV3 RNA NPH QL NAA+NON-PROBE: NOT DETECTED
HPIV4 RNA NPH QL NAA+NON-PROBE: NOT DETECTED
HUMAN METAPNEUMOVIRUS: NOT DETECTED
HYPOCHROMIA BLD QL SMEAR: ABNORMAL
IMM GRANULOCYTES # BLD AUTO: ABNORMAL K/UL (ref 0–0.04)
IMM GRANULOCYTES NFR BLD AUTO: ABNORMAL % (ref 0–0.5)
INFLUENZA A (SUBTYPES H1,H1-2009,H3): NOT DETECTED
LABCORP MISC TEST CODE: NORMAL
LABCORP MISC TEST NAME: NORMAL
LABCORP MISCELLANEOUS TEST: NORMAL
LYMPHOCYTES NFR BLD: 6 % (ref 18–48)
MAGNESIUM SERPL-MCNC: 1.8 MG/DL (ref 1.6–2.6)
MCH RBC QN AUTO: 27 PG (ref 27–31)
MCHC RBC AUTO-ENTMCNC: 32.2 G/DL (ref 32–36)
MCV RBC AUTO: 84 FL (ref 82–98)
MONOCYTES NFR BLD: 6 % (ref 4–15)
MRSA SCREEN BY PCR: NEGATIVE
MYCOPLASMA PNEUMONIAE: NOT DETECTED
MYELOCYTES NFR BLD MANUAL: 5 %
NEUTROPHILS NFR BLD: 77 % (ref 38–73)
NEUTS BAND NFR BLD MANUAL: 4 %
NRBC BLD-RTO: 0 /100 WBC
PLATELET # BLD AUTO: 501 K/UL (ref 150–450)
PLATELET BLD QL SMEAR: ABNORMAL
PMV BLD AUTO: 11.1 FL (ref 9.2–12.9)
POTASSIUM SERPL-SCNC: 4.7 MMOL/L (ref 3.5–5.1)
PROCALCITONIN SERPL IA-MCNC: 8.98 NG/ML (ref 0–0.5)
PROT SERPL-MCNC: 7 G/DL (ref 6–8.4)
RBC # BLD AUTO: 3.41 M/UL (ref 4–5.4)
RESPIRATORY INFECTION PANEL SOURCE: NORMAL
RSV RNA NPH QL NAA+NON-PROBE: NOT DETECTED
RV+EV RNA NPH QL NAA+NON-PROBE: NOT DETECTED
SARS-COV-2 RNA RESP QL NAA+PROBE: NOT DETECTED
SODIUM SERPL-SCNC: 140 MMOL/L (ref 136–145)
TARGETS BLD QL SMEAR: ABNORMAL
WBC # BLD AUTO: 23.17 K/UL (ref 3.9–12.7)

## 2024-04-30 PROCEDURE — 83735 ASSAY OF MAGNESIUM: CPT | Performed by: HOSPITALIST

## 2024-04-30 PROCEDURE — 25000003 PHARM REV CODE 250: Performed by: STUDENT IN AN ORGANIZED HEALTH CARE EDUCATION/TRAINING PROGRAM

## 2024-04-30 PROCEDURE — 36415 COLL VENOUS BLD VENIPUNCTURE: CPT | Performed by: HOSPITALIST

## 2024-04-30 PROCEDURE — 99233 SBSQ HOSP IP/OBS HIGH 50: CPT | Mod: FS,,, | Performed by: NURSE PRACTITIONER

## 2024-04-30 PROCEDURE — 85027 COMPLETE CBC AUTOMATED: CPT | Performed by: HOSPITALIST

## 2024-04-30 PROCEDURE — 87040 BLOOD CULTURE FOR BACTERIA: CPT | Performed by: NURSE PRACTITIONER

## 2024-04-30 PROCEDURE — 63600175 PHARM REV CODE 636 W HCPCS: Performed by: NURSE PRACTITIONER

## 2024-04-30 PROCEDURE — 94761 N-INVAS EAR/PLS OXIMETRY MLT: CPT

## 2024-04-30 PROCEDURE — 93005 ELECTROCARDIOGRAM TRACING: CPT | Performed by: INTERNAL MEDICINE

## 2024-04-30 PROCEDURE — 25000003 PHARM REV CODE 250: Performed by: HOSPITALIST

## 2024-04-30 PROCEDURE — 63600175 PHARM REV CODE 636 W HCPCS: Mod: JZ,JG | Performed by: INTERNAL MEDICINE

## 2024-04-30 PROCEDURE — 25000003 PHARM REV CODE 250: Performed by: INTERNAL MEDICINE

## 2024-04-30 PROCEDURE — 99900031 HC PATIENT EDUCATION (STAT)

## 2024-04-30 PROCEDURE — 84145 PROCALCITONIN (PCT): CPT | Performed by: STUDENT IN AN ORGANIZED HEALTH CARE EDUCATION/TRAINING PROGRAM

## 2024-04-30 PROCEDURE — 87641 MR-STAPH DNA AMP PROBE: CPT | Performed by: NURSE PRACTITIONER

## 2024-04-30 PROCEDURE — 63600175 PHARM REV CODE 636 W HCPCS: Performed by: INTERNAL MEDICINE

## 2024-04-30 PROCEDURE — 25000003 PHARM REV CODE 250: Performed by: NURSE PRACTITIONER

## 2024-04-30 PROCEDURE — 63600175 PHARM REV CODE 636 W HCPCS: Performed by: HOSPITALIST

## 2024-04-30 PROCEDURE — 27000221 HC OXYGEN, UP TO 24 HOURS

## 2024-04-30 PROCEDURE — 87633 RESP VIRUS 12-25 TARGETS: CPT | Performed by: NURSE PRACTITIONER

## 2024-04-30 PROCEDURE — 85007 BL SMEAR W/DIFF WBC COUNT: CPT | Performed by: HOSPITALIST

## 2024-04-30 PROCEDURE — 12000002 HC ACUTE/MED SURGE SEMI-PRIVATE ROOM

## 2024-04-30 PROCEDURE — C9113 INJ PANTOPRAZOLE SODIUM, VIA: HCPCS | Performed by: INTERNAL MEDICINE

## 2024-04-30 PROCEDURE — 63600175 PHARM REV CODE 636 W HCPCS: Performed by: STUDENT IN AN ORGANIZED HEALTH CARE EDUCATION/TRAINING PROGRAM

## 2024-04-30 PROCEDURE — 93010 ELECTROCARDIOGRAM REPORT: CPT | Mod: ,,, | Performed by: INTERNAL MEDICINE

## 2024-04-30 PROCEDURE — 80053 COMPREHEN METABOLIC PANEL: CPT | Performed by: HOSPITALIST

## 2024-04-30 RX ORDER — VANCOMYCIN HCL IN 5 % DEXTROSE 1G/250ML
1000 PLASTIC BAG, INJECTION (ML) INTRAVENOUS ONCE
Status: COMPLETED | OUTPATIENT
Start: 2024-04-30 | End: 2024-04-30

## 2024-04-30 RX ORDER — INSULIN ASPART 100 [IU]/ML
0-10 INJECTION, SOLUTION INTRAVENOUS; SUBCUTANEOUS EVERY 6 HOURS PRN
Status: DISCONTINUED | OUTPATIENT
Start: 2024-04-30 | End: 2024-05-08 | Stop reason: HOSPADM

## 2024-04-30 RX ADMIN — PANTOPRAZOLE SODIUM 40 MG: 40 INJECTION, POWDER, FOR SOLUTION INTRAVENOUS at 08:04

## 2024-04-30 RX ADMIN — AMLODIPINE BESYLATE 10 MG: 5 TABLET ORAL at 08:04

## 2024-04-30 RX ADMIN — Medication 2 TABLET: at 09:04

## 2024-04-30 RX ADMIN — VANCOMYCIN HYDROCHLORIDE 1000 MG: 1 INJECTION, POWDER, LYOPHILIZED, FOR SOLUTION INTRAVENOUS at 05:04

## 2024-04-30 RX ADMIN — INSULIN ASPART 2 UNITS: 100 INJECTION, SOLUTION INTRAVENOUS; SUBCUTANEOUS at 09:04

## 2024-04-30 RX ADMIN — INSULIN ASPART 5 UNITS: 100 INJECTION, SOLUTION INTRAVENOUS; SUBCUTANEOUS at 09:04

## 2024-04-30 RX ADMIN — VANCOMYCIN HYDROCHLORIDE 125 MG: KIT at 06:04

## 2024-04-30 RX ADMIN — LEVETIRACETAM 500 MG: 5 INJECTION INTRAVENOUS at 08:04

## 2024-04-30 RX ADMIN — VANCOMYCIN HYDROCHLORIDE 125 MG: KIT at 08:04

## 2024-04-30 RX ADMIN — PANTOPRAZOLE SODIUM 40 MG: 40 INJECTION, POWDER, FOR SOLUTION INTRAVENOUS at 09:04

## 2024-04-30 RX ADMIN — INSULIN ASPART 2 UNITS: 100 INJECTION, SOLUTION INTRAVENOUS; SUBCUTANEOUS at 02:04

## 2024-04-30 RX ADMIN — CEFTAZIDIME, AVIBACTAM 1.25 G: 2; .5 POWDER, FOR SOLUTION INTRAVENOUS at 09:04

## 2024-04-30 RX ADMIN — INSULIN ASPART 3 UNITS: 100 INJECTION, SOLUTION INTRAVENOUS; SUBCUTANEOUS at 01:04

## 2024-04-30 RX ADMIN — CLONIDINE HYDROCHLORIDE 0.1 MG: 0.1 TABLET ORAL at 08:04

## 2024-04-30 RX ADMIN — LEVETIRACETAM 500 MG: 5 INJECTION INTRAVENOUS at 09:04

## 2024-04-30 RX ADMIN — MUPIROCIN 1 G: 20 OINTMENT TOPICAL at 08:04

## 2024-04-30 NOTE — CARE UPDATE
04/29/24 2046   Patient Assessment/Suction   Level of Consciousness (AVPU) responds to voice   Respiratory Effort Normal;Unlabored   Expansion/Accessory Muscles/Retractions no use of accessory muscles;no retractions   Rhythm/Pattern, Respiratory pattern regular;unlabored   Cough Frequency no cough   PRE-TX-O2   Device (Oxygen Therapy) nasal cannula   $ Is the patient on Low Flow Oxygen? Yes   Flow (L/min) (Oxygen Therapy) 2   SpO2 95 %   Pulse Oximetry Type Intermittent   $ Pulse Oximetry - Single Charge Pulse Oximetry - Single

## 2024-04-30 NOTE — CONSULTS
Sacral stage 3 red beefy granulation tissue 2x1.6x0.4cm, applied Medihoney after cleaning and covered with gauze and Mepilex. right buttock sheering over periwound scarring, pink 1.1x1.3x0.1cm applied Triad to periwound    Cleaned around per site pink no drainage new dressing applied    Left medial lower leg stage 2 pink wound bed  2x1.4x0.3cm cleaned and dried and applied Medihoney covered with gauze and mepilex. Heel protectors applied.  Heels without breakdown.

## 2024-04-30 NOTE — ASSESSMENT & PLAN NOTE
Patient has hypokalemia which is Acute and currently controlled. Most recent potassium levels reviewed-   Lab Results   Component Value Date    K 4.7 04/30/2024   . Will continue potassium replacement per protocol and recheck repeat levels after replacement completed.   -----------------------------------------------------------------------  Potassium currently normal

## 2024-04-30 NOTE — PROGRESS NOTES
[facility]  Adult Nutrition   Progress Note (Initial Assessment)     SUMMARY     Recommendations  Recommendation/Intervention: 1. Continue Glucerna 1.5 @ 40 mL/hr goal rate. Provides 1440 kcal, 79 gm protein, and 729 mL free water. 2. Recommend changing FWF to 150 mL every 4 hours (900 mL) to meet fluid needs (1629 mL). Total current free water 1929 mL exceeds needs with BUN trending to target range.  Goals: 1. Patient will continue to tolerate enteral feeding by follow up. 2. Labs trend to target range by follow up.  Nutrition Goal Status: new  Communication of RD Recs: reviewed with physician    Nutrition Diagnosis PES Statement: Increased nutrition needs related to wound healing as evidenced by stage 3 sacral wound, lower leg stage 2 wound on enteral feeding.     Dietitian Rounds Brief  RD screen for NPO/ enteral feeding; NPO >/= 5 days. RD assessed patients needs per wound care assessment and recommend continuing current feed with decreased water flushes to 150 mL q 4 hours. BUN trending to target range. RD to folow for tolerance and labs PRN.    Nutrition Related Social Determinants of Health: SDOH: Adequate food in home environment    Malnutrition Assessment  No overt signs per visual           Diet order:   Current Diet Order: NPO      Current Nutrition Support Formula Ordered: Glucerna 1.5  Current Nutrition Support Rate Ordered: 40 (ml)  Current Nutrition Support Frequency Ordered: continuous    Evaluation of Received Nutrient/Fluid Intake  Enteral Calories (kcal): 1440  Enteral Protein (gm): 79  Enteral (Free Water) Fluid (mL): 729  Free Water Flush Fluid (mL): 1200  Energy Calories Required: meeting needs  Protein Required: meeting needs  Fluid Required: exceeds needs  Tolerance: tolerating     % Intake of Estimated Energy Needs: 75 - 100 %  % Meal Intake: 75 - 100 %      Intake/Output Summary (Last 24 hours) at 4/30/2024 1542  Last data filed at 4/30/2024 0649  Gross per 24 hour   Intake  "700 ml   Output 1350 ml   Net -650 ml        Anthropometrics  Temp: 98.7 °F (37.1 °C)  Height Method: Stated  Height: 4' 11" (149.9 cm)  Height (inches): 59 in  Weight Method: Bed Scale  Weight: 53.5 kg (118 lb)  Weight (lb): 118 lb  Ideal Body Weight (IBW), Female: 95 lb  % Ideal Body Weight, Female (lb): 124.21 %  BMI (Calculated): 23.8  BMI Grade: 18.5-24.9 - normal       Estimated/Assessed Needs  Weight Used For Calorie Calculations: 53.5 kg (117 lb 15.1 oz)  Energy Calorie Requirements (kcal): 7564-1426 / day (25-30 kcal/kg  Energy Need Method: Kcal/kg  Protein Requirements: 64-80 gm/day (1.2-1.5 gm/kg)  Weight Used For Protein Calculations: 53.5 kg (117 lb 15.1 oz)     Estimated Fluid Requirement Method: RDA Method  RDA Method (mL): 1337  CHO Requirement: 167-200 gm/day    Reason for Assessment  Reason For Assessment: identified at risk by screening criteria (NPO / PEG)  Diagnosis: gastrointestinal disease  Relevant Medical History: CVA, PEG feedings  Interdisciplinary Rounds: did not attend  General Information Comments: CVA with known baseline confusion and aphasia who was sent to the ER because of black drainage coming from around the PEG tube site and having black vomit.  Nutrition Discharge Planning: Pending    Nutrition/Diet History  Food Allergies: NKFA  Factors Affecting Nutritional Intake: NPO  Nutrition Support Formula Prior to Admit: Glucerna 1.5  Nutrition Support Rate Prior to Admit:  (unsure)    Nutrition Risk Screen  Nutrition Risk Screen: difficulty chewing/swallowing       Wound 04/26/24 1100 Right Foot-Wound Image: Images linked       Wound 04/26/24 1100 Left Calf-Wound Image: Images linked  MST Score: 2  Have you recently lost weight without trying?: Unsure  Weight loss score: 2  Have you been eating poorly because of a decreased appetite?: No  Appetite score: 0       Weight History:  Wt Readings from Last 10 Encounters:   04/26/24 53.5 kg (118 lb)   02/23/24 54.2 kg (119 lb 7.8 oz) "   02/14/24 61.7 kg (136 lb)   01/09/24 61.7 kg (136 lb)   12/06/23 61.8 kg (136 lb 4.3 oz)   12/12/23 61.7 kg (136 lb)   12/11/23 61.8 kg (136 lb 3.9 oz)   11/21/23 56.2 kg (124 lb)   10/24/23 56.5 kg (124 lb 9 oz)   08/12/23 55.8 kg (123 lb 0.3 oz)        Lab/Procedures/Meds: Pertinent Labs/Meds Reviewed    Medications:Pertinent Medications Reviewed  Scheduled Meds:  Current Facility-Administered Medications   Medication Dose Route Frequency    amLODIPine  10 mg Per G Tube Daily    ceftazidime-avibactam (AVYCAZ) IVPB  1.25 g Intravenous Q8H    insulin aspart U-100  0-5 Units Subcutaneous Q4H    insulin detemir U-100  15 Units Subcutaneous QHS    levETIRAcetam (Keppra) IV (PEDS and ADULTS)  500 mg Intravenous Q12H    mupirocin   Nasal BID    pantoprazole  40 mg Intravenous BID    scopolamine  1 patch Transdermal Q3 Days    vancomycin  125 mg Per G Tube Q6H     Continuous Infusions:  Current Facility-Administered Medications   Medication Dose Route Frequency Last Rate Last Admin     PRN Meds:.  Current Facility-Administered Medications:     0.9%  NaCl infusion (for blood administration), , Intravenous, Q24H PRN    acetaminophen, 650 mg, Oral, Q4H PRN    aluminum-magnesium hydroxide-simethicone, 30 mL, Oral, QID PRN    [COMPLETED] calcium gluconate IVPB, 1 g, Intravenous, ED 1 Time **AND** calcium gluconate IVPB, 1 g, Intravenous, Q10 Min PRN    cloNIDine, 0.1 mg, Oral, Q8H PRN    dextrose 50%, 12.5 g, Intravenous, PRN    dextrose 50%, 25 g, Intravenous, PRN    glucagon (human recombinant), 1 mg, Intramuscular, PRN    glucose, 16 g, Oral, PRN    glucose, 24 g, Oral, PRN    HYDROcodone-acetaminophen, 1 tablet, Oral, Q6H PRN    magnesium oxide, 800 mg, Oral, PRN    magnesium oxide, 800 mg, Oral, PRN    melatonin, 6 mg, Oral, Nightly PRN    naloxone, 0.02 mg, Intravenous, PRN    ondansetron, 4 mg, Intravenous, Q6H PRN    potassium bicarbonate, 35 mEq, Oral, PRN    potassium bicarbonate, 50 mEq, Oral, PRN    potassium  "bicarbonate, 60 mEq, Oral, PRN    potassium, sodium phosphates, 2 packet, Oral, PRN    potassium, sodium phosphates, 2 packet, Oral, PRN    potassium, sodium phosphates, 2 packet, Oral, PRN    sodium chloride 0.9%, 10 mL, Intravenous, Q12H PRN    Labs: Pertinent Labs Reviewed  Clinical Chemistry:  Recent Labs   Lab 04/25/24  1223 04/25/24  2146 04/28/24  0455 04/28/24  1333 04/29/24  0628 04/30/24  0649   *   < > 144  --  142 140   K 5.9*   < > 3.4*   < > 4.6 4.7   CL 96   < > 109  --  110 109   CO2 18*   < > 25  --  26 24   *   < > 148*  --  209* 247*   BUN 93*   < > 56*  --  39* 35*   CREATININE 2.5*   < > 1.4  --  1.0 0.9   CALCIUM 9.7   < > 9.2  --  8.8 8.8   PROT 8.0   < > 7.4  --  6.8 7.0   ALBUMIN 3.3*   < > 2.9*  --  2.7* 2.7*   BILITOT 0.6   < > 0.6  --  0.4 0.3   ALKPHOS 89   < > 152*  --  147* 165*   AST 18   < > 102*  --  51* 47*   ALT 15   < > 86*  --  60* 63*   ANIONGAP 19*   < > 10  --  6* 7*   MG 2.2   < > 1.8   < > 1.6 1.8   PHOS 3.8  --   --   --   --   --    LIPASE 16  --   --   --   --   --     < > = values in this interval not displayed.     CBC:   Recent Labs   Lab 04/30/24  0649   WBC 23.17*   RBC 3.41*   HGB 9.2*   HCT 28.6*   *   MCV 84   MCH 27.0   MCHC 32.2     Lipid Panel:  No results for input(s): "CHOL", "HDL", "LDLCALC", "TRIG", "CHOLHDL" in the last 168 hours.  Cardiac Profile:  Recent Labs   Lab 04/27/24  1121   *     Inflammatory Labs:  Recent Labs   Lab 04/29/24  0628   CRP >16.00*     Diabetes:  No results for input(s): "HGBA1C", "POCTGLUCOSE" in the last 168 hours.  Thyroid & Parathyroid:  No results for input(s): "TSH", "FREET4", "N8GGNKE", "A6QEKQG", "THYROIDAB" in the last 168 hours.    Monitor and Evaluation  Food and Nutrient Intake: energy intake, food and beverage intake  Food and Nutrient Adminstration: diet order  Knowledge/Beliefs/Attitudes: food and nutrition knowledge/skill  Physical Activity and Function: nutrition-related ADLs and " IADLs  Anthropometric Measurements: weight change, weight, body mass index  Biochemical Data, Medical Tests and Procedures: gastrointestinal profile, electrolyte and renal panel, glucose/endocrine profile, inflammatory profile, lipid profile  Nutrition-Focused Physical Findings: overall appearance     Nutrition Risk  Level of Risk/Frequency of Follow-up:  (2 times / week)     Nutrition Follow-Up  RD Follow-up?: Yes      Jaelyn Mireles RD 04/30/2024 3:42 PM

## 2024-04-30 NOTE — ASSESSMENT & PLAN NOTE
History of chronic Mathur catheter  Catheter changed on 04/25/24   Urine culture collected 4/23 (+) Klebsiella pneumoniae (/MDRO) and Providencia stuartii  Urine culture collected 4/29 negative so far  Repeat urine culture collected today and is pending  Continue ceftazidime-avibactam (Started 4/29)  UA collected 4/29 improved when compared to UA collected 4/25: Bact- few (4/29),Many (4/25)/ Color- yellow (was red 4/25)/ Blood- 1+ (was 3+)/ still with pyuria and 3+ Leuk Est/ Remains Nitrite neg  Awaiting urine culture results

## 2024-04-30 NOTE — ASSESSMENT & PLAN NOTE
Stool positive C diff antigen, negative toxins, Negative C. Diff PCR---> not acute C. Diff infection  ID following-- continue oral vancomycin for now  Diarrhea possibly from tube feedings  Start lactobacillus 2 tabs bid

## 2024-04-30 NOTE — PROGRESS NOTES
Pharmacokinetic Initial Assessment: IV Vancomycin    Assessment/Plan:    Initiate intravenous vancomycin with loading dose of 1000 mg once followed by a maintenance dose of vancomycin 750 mg IV every 24 hours  Desired empiric serum trough concentration is 15 to 20 mcg/mL  Draw vancomycin trough level 30 min prior to fourth dose on 5/3 at approximately 17:00  Pharmacy will continue to follow and monitor vancomycin.      Please contact pharmacy at extension [phone] with any questions regarding this assessment.     Thank you for the consult,   Cira Garcia       Patient brief summary:  Steff Johnson is a 64 y.o. female initiated on antimicrobial therapy with IV Vancomycin for treatment of suspected bacteremia    Drug Allergies:   Review of patient's allergies indicates:  No Known Allergies    Actual Body Weight:   53.5 kg    Renal Function:   Estimated Creatinine Clearance: 47.4 mL/min (based on SCr of 0.9 mg/dL).,     Dialysis Method (if applicable):  N/A    CBC (last 72 hours):  Recent Labs   Lab Result Units 04/28/24  0455 04/29/24  0628 04/30/24  0649   WBC K/uL 28.44* 26.35* 23.17*   Hemoglobin g/dL 10.3* 8.6* 9.2*   Hematocrit % 30.6* 26.0* 28.6*   Platelets K/uL 498* 498* 501*   Gran % % 86.6* 80.7* 77.0*   Lymph % % 5.0* 5.9* 6.0*   Mono % % 5.7 7.9 6.0   Eosinophil % % 0.3 1.3 2.0   Basophil % % 0.5 0.4 0.0   Differential Method  Automated Automated Manual       Metabolic Panel (last 72 hours):  Recent Labs   Lab Result Units 04/28/24  0455 04/28/24  1333 04/28/24  1758 04/29/24  0628 04/29/24  1058 04/30/24  0649   Sodium mmol/L 144  --   --  142  --  140   Potassium mmol/L 3.4* 4.6  --  4.6  --  4.7   Chloride mmol/L 109  --   --  110  --  109   CO2 mmol/L 25  --   --  26  --  24   Glucose mg/dL 148*  --   --  209*  --  247*   Glucose, UA   --   --   --   --  Negative  --    BUN mg/dL 56*  --   --  39*  --  35*   Creatinine mg/dL 1.4  --   --  1.0  --  0.9   Albumin g/dL 2.9*  --   --  2.7*  --  2.7*  "  Total Bilirubin mg/dL 0.6  --   --  0.4  --  0.3   Alkaline Phosphatase U/L 152*  --   --  147*  --  165*   AST U/L 102*  --   --  51*  --  47*   ALT U/L 86*  --   --  60*  --  63*   Magnesium mg/dL 1.8  --  1.7 1.6  --  1.8       Drug levels (last 3 results):  No results for input(s): "VANCOMYCINRA", "VANCORANDOM", "VANCOMYCINPE", "VANCOPEAK", "VANCOMYCINTR", "VANCOTROUGH" in the last 72 hours.    Microbiologic Results:  Microbiology Results (last 7 days)       Procedure Component Value Units Date/Time    MRSA Screen by PCR [[id number]] Collected: 04/30/24 1653    Order Status: Sent Specimen: Nasal Swab Updated: 04/30/24 1706    Respiratory Infection Panel (PCR), Nasopharyngeal [[id number]]     Order Status: No result Specimen: Nasopharyngeal Swab     Blood culture [[id number]]     Order Status: Sent Specimen: Blood     Blood culture [[id number]]     Order Status: Sent Specimen: Blood     Blood culture [[id number]] Collected: 04/29/24 0628    Order Status: Completed Specimen: Blood from Peripheral, Hand, Right Updated: 04/30/24 0832     Blood Culture, Routine No Growth to date      No Growth to date    Urine culture [[id number]] Collected: 04/29/24 1058    Order Status: Completed Specimen: Urine Updated: 04/30/24 0653     Urine Culture, Routine No growth to date    Narrative:      Specimen Source->Urine    Blood culture x two cultures. Draw prior to antibiotics. [[id number]]  (Abnormal) Collected: 04/25/24 1223    Order Status: Completed Specimen: Blood Updated: 04/29/24 1124     Blood Culture, Routine Gram stain aer bottle: Gram positive cocci      Results called to and read back by:Octavio Rodriguez RN-ED;  04/26/2024      07:56 CJD      STAPHYLOCOCCUS HAEMOLYTICUS  For susceptibility see order #[id number]      Narrative:      Aerobic and anaerobic    Blood culture x two cultures. Draw prior to antibiotics. [[id number]]  (Abnormal)  (Susceptibility) Collected: 04/25/24 1247    Order Status: Completed Specimen: " Blood Updated: 04/29/24 1124     Blood Culture, Routine Gram stain aer bottle: Gram positive cocci      Results called to and read back by:Octavio Rodriguez RN-ED;  04/26/2024      07:35 CJD      STAPHYLOCOCCUS HAEMOLYTICUS    Narrative:      Aerobic and anaerobic    Urine culture [[id number]]  (Abnormal)  (Susceptibility) Collected: 04/25/24 1434    Order Status: Completed Specimen: Urine Updated: 04/29/24 1051     Urine Culture, Routine PROVIDENCIA STUARTII  > 100,000 cfu/ml        KLEBSIELLA PNEUMONIAE   > 100,000 cfu/ml  MDRO  Results called to and read back by Miracle Callahan RN-WINGA;    04/28/2024  12:16 CJD       Comment: Previous value was 2015 verified by I/AUT at 06:27 on 04/28/2024       Narrative:      Specimen Source->Urine    Stool culture **cannot be ordered stat** [[id number]] Collected: 04/25/24 1526    Order Status: Completed Specimen: Stool Updated: 04/27/24 1341     Stool Culture No Salmonella,Shigella,Vibrio,Campylobacter.      No E coli 0157:H7 isolated.    Rapid Organism ID by PCR (from Blood culture) [[id number]]  (Abnormal) Collected: 04/25/24 1247    Order Status: Completed Updated: 04/26/24 0826     Enterococcus faecalis Not Detected     Enterococcus faecium Not Detected     Listeria monocytogenes Not Detected     Staphylococcus spp. Detected     Staphylococcus aureus Not Detected     Staphylococcus epidermidis Not Detected     Staphylococcus lugdunensis Not Detected     Streptococcus species Not Detected     Streptococcus agalactiae Not Detected     Streptococcus pneumoniae Not Detected     Streptococcus pyogenes Not Detected     Acinetobacter calcoaceticus/baumannii complex Not Detected     Bacteroides fragilis Not Detected     Enterobacterales Not Detected     Enterobacter cloacae complex Not Detected     Escherichia coli Not Detected     Klebsiella aerogenes Not Detected     Klebsiella oxytoca Not Detected     Klebsiella pneumoniae group Not Detected     Proteus Not Detected      Salmonella sp Not Detected     Serratia marcescens Not Detected     Haemophilus influenzae Not Detected     Neisseria meningtidis Not Detected     Pseudomonas aeruginosa Not Detected     Stenotrophomonas maltophilia Not Detected     Candida albicans Not Detected     Candida auris Not Detected     Candida glabrata Not Detected     Candida krusei Not Detected     Candida parapsilosis Not Detected     Candida tropicalis Not Detected     Cryptococcus neoformans/gattii Not Detected     CTX-M (ESBL ) Test not applicable     IMP (Carbapenem resistant) Test not applicable     KPC resistance gene (Carbapenem resistant) Test not applicable     mcr-1  Test not applicable     mec A/C  Test not applicable     mec A/C and MREJ (MRSA) gene Test not applicable     NDM (Carbapenem resistant) Test not applicable     OXA-48-like (Carbapenem resistant) Test not applicable     van A/B (VRE gene) Test not applicable     VIM (Carbapenem resistant) Test not applicable    Narrative:      Aerobic and anaerobic    C Diff Toxin by PCR [[id number]] Collected: 04/25/24 1526    Order Status: Completed Updated: 04/25/24 2104     C. diff PCR Negative    Clostridium difficile EIA [[id number]]  (Abnormal) Collected: 04/25/24 1526    Order Status: Completed Specimen: Stool Updated: 04/25/24 1624     C. diff Antigen Positive     C difficile Toxins A+B, EIA Negative     Comment: Testing not recommended for children <24 months old.

## 2024-04-30 NOTE — CARE UPDATE
04/30/24 0757   PRE-TX-O2   Device (Oxygen Therapy) nasal cannula   $ Is the patient on Low Flow Oxygen? Yes   Flow (L/min) (Oxygen Therapy) 2   SpO2 97 %   Pulse Oximetry Type Intermittent   $ Pulse Oximetry - Multiple Charge Pulse Oximetry - Multiple   Pulse 85   Resp 18   Education   $ Education 15 min;DME Oxygen

## 2024-04-30 NOTE — PROGRESS NOTES
[facility]   Department of Infectious Disease  Progress  Note        PATIENT NAME: Steff Johnson  MRN: [id number]  TODAY'S DATE: 04/30/2024  ADMIT DATE: 4/25/2024  LOS: 5 days    CHIEF COMPLAINT: GI Problem (Brown fluid being withdrawn from peg tube, vomiting brown fluid. Smells of feces. Since this morning. Pt is nonverbal)    PRINCIPLE PROBLEM: GI hemorrhage    REASON FOR CONSULT: UA and c.diffi     INTERVAL HISTORY     4/30/24@1315 (Denette):  Patient seen alone in her room today.  She opens her eyes but does not pay attention.  She does not appear in any distress in his on room air.  She has a rectal tube with a copious amount of liquid stool.  Last chest x-ray from 04/27 with small right pleural effusion and right basilar airspace disease that appears worse than previous with subsegmental atelectasis of the left lung base.  Last fever on 04/28 was 102.2, T-max in the last 24 hours 100, WBC 23.17 still elevated, platelets 501 still elevated, left shift 77% with 4% bands.  Anemia better after blood transfusion at 9.2/28.6.  BUN/CR 35/0.9, creatinine clearance 47.4, alk-phos elevated 165, albumin 2.7, ALT/AST 63/47.  Procalcitonin 8.983 and CRP greater than 16.  Urine culture from 04/25 with Providencia stuartii and Klebsiella pneumoniae /MDRO; 4/25 blood cultures x2 sets with 1 bottle from each set with aerobic bottle positive for staph haemolyticus (vanc ana 2).    04/27/2024.  Dr. Calvo covering for the weekend.  Patient is nonverbal as usual.  I feel like she understands me when I talk to her.  Talking gently and slowly puts her at ease.  She is afebrile at this time.  T-max yesterday was 101.8.  Much better than T-max on admission of 102.3  Blood cultures are showing Staphylococcus species.  Will order repeat blood cultures   Urine cultures are showing Providencia and a LPGNR in progress.    Stool cultures no growth to date.    Antibiotics (From admission, onward)      Start     Stop Route  "Frequency Ordered    04/30/24 1800  vancomycin 125 mg/5 mL oral solution 125 mg  (C. difficile Infection (CDI) Treatment Order Panel)         -- PER G TUBE Every 6 hours 04/30/24 1533    04/30/24 1653  vancomycin - pharmacy to dose  (vancomycin IVPB (PEDS and ADULTS))        Placed in "And" Linked Group    -- IV pharmacy to manage frequency 04/30/24 1554    04/29/24 0800  cefTAZidime-avibactam (AVYCAZ) 1.25 g in dextrose 5 % (D5W) 100 mL         -- IV Every 8 hours (non-standard times) 04/29/24 0046    04/25/24 2100  mupirocin 2 % ointment         04/30/24 2059 Nasl 2 times daily 04/25/24 1905          Antifungals (From admission, onward)      None             ASSESSMENT and PLAN     1. Staphylococcus haemolyticus bacteremia   -in December 2023 patient had Enterococcus faecalis bacteremia, Proteus mirabilis ESBL bacteremia both from urinary source  -4/25 blood cultures x2 sets with aerobic bottle from each set positive for staph haemolyticus - sensitive to Bactrim and vancomycin (LEAH 2)  -4/29 blood culture x1 set no growth to date, pending   -procalcitonin 8.983, CRP>16   -4/30 leukocytosis, initially 24.95, peaked at 28.44 on 04/28, 23.17 on 04/30, left shift, 4% band    2. Catheter associated UTI.    -previously grew ESBL Proteus in December 2023  -Catheter changed on 04/25/24.  -4/25 urine culture with Providencia stuartii and Klebsiella pneumoniae /MDRO   -4/29 urine culture no growth to date    3. Positive C diff antigen with negative PCR and toxin assay  -diarrhea watery, stool cultures negative, C diff antigen positive, toxin and PCR negative    4. Acute kidney injury  -initial BUN/CR 84/2.7; followed by Nephrology  -4/30 BUN/CR 35/0.9 - current creatinine clearance 47.4    5. Acute on chronic anemia  -given given 2 units PRBCs, managed by hospitalist    6. Pneumonia versus pulmonary edema  -initial CT chest abdomen pelvis with scattered nonspecific bronchiolitis and right lower lobe opacities with " chronic bronchopneumonia similar to previous from December 2023  -chest x-rays with minimal improvement  -procalcitonin elevated 8.983    7. Chronic medical problems including CVA, aphasia, dysphagia with PEG tube, chronic indwelling Mathur catheter, seizures        RECOMMENDATIONS:    Continue oral vancomycin 125 mg via PEG tube but increase to every 6 hours  Continue ceftazidime-avibactam 1.25 g every 8 hours over to our infusions  Start vancomycin with pharmacy to dose after blood cultures x2 drawn today  Monitor renal function\  Trend infectious inflammatory markers   Aspiration precautions  Obtain CRP and procalcitonin in a.m.   Obtain KUB today   Obtain MRSA screen   Obtain echocardiogram  Obtain RVP    D/W Dr Cochran    Please send [phone] secure chat with any questions.      Antibiotics (From admission, onward)      Start     Stop Route Frequency Ordered    04/30/24 2100  vancomycin 125 mg/5 mL oral solution 125 mg  (C. difficile Infection (CDI) Treatment Order Panel)         -- PER G TUBE 2 times daily 04/30/24 1510    04/29/24 0800  cefTAZidime-avibactam (AVYCAZ) 1.25 g in dextrose 5 % (D5W) 100 mL         -- IV Every 8 hours (non-standard times) 04/29/24 0046    04/25/24 2100  mupirocin 2 % ointment         04/30/24 2059 Nasl 2 times daily 04/25/24 1905          Antifungals (From admission, onward)      None           Antivirals (From admission, onward)      None              Review of patient's allergies indicates:  No Known Allergies        SUBJECTIVE     Review of systems: Unable to obtain      OBJECTIVE   Temp:  [97.6 °F (36.4 °C)-100 °F (37.8 °C)] 98.7 °F (37.1 °C)  Pulse:  [77-98] 77  Resp:  [18-19] 18  SpO2:  [94 %-100 %] 98 %  BP: (148-168)/(70-91) 153/70  Temp:  [97.6 °F (36.4 °C)-100 °F (37.8 °C)]   Temp: 98.7 °F (37.1 °C) (04/30/24 1125)  Pulse: 77 (04/30/24 1125)  Resp: 18 (04/30/24 1125)  BP: (!) 153/70 (04/30/24 1125)  SpO2: 98 % (04/30/24 1125)    Intake/Output Summary (Last 24 hours) at  4/30/2024 1435  Last data filed at 4/30/2024 0649  Gross per 24 hour   Intake 700 ml   Output 1350 ml   Net -650 ml     Physical Exam  General:  Eyes open spontaneously, poor eye contact   Eyes: Eyes with no icterus or injection.  No exudates.  Ears:  Hearing seems grossly intact - responds to voice.  Mouth:  Patient would not open mouth  Cardiovascular: Regular rate and rhythm, no murmurs, no significant edema  Respiratory:  Clear anteriorly, on RA, no tachypnea or increased work of breathing.   Gastrointestinal:  Soft and obese with active bowel sounds, no distention.  Peg tube with no redness or drainage at PEG site.  Rectal tube with watery yellow green diarrhea.  Genitourinary:  Urinary catheter with clear yellow urine  Musculoskeletal:  No spontaneous movement, heel protectors in place.   Skin: See wound photos today. Pale, warm and dry.   Neuro: poorly attentive, does not follow commands, nonverbal  Psych:  No agitation,   VAD:  PIV  ISOLATION:  Contact isolation    Wounds:   4/30                        Significant Labs: All pertinent labs within the past 24 hours have been reviewed.    CBC LAST 7  Recent Labs   Lab 04/25/24  1223 04/25/24  1223 04/25/24  1701 04/25/24  1854 04/26/24  0405 04/26/24  0608 04/26/24  1748 04/27/24  0014 04/27/24  0615 04/27/24  1121 04/28/24  0455 04/29/24  0628 04/30/24  0649   WBC 28.38*  --  24.95*  --  24.03*  --   --   --  19.61*  --  28.44* 26.35* 23.17*   RBC 3.41*  --  2.76*  --  3.38*  --   --   --  3.18*  --  3.74* 3.13* 3.41*   HGB 8.8*  --  7.3*   < > 9.4*   < > 10.9* 10.0* 8.8*  8.8* 9.8* 10.3* 8.6* 9.2*   HCT 27.0*  --  22.3*  --  27.9*  --   --   --  25.5*  --  30.6* 26.0* 28.6*   MCV 79*  --  81*  --  83  --   --   --  80*  --  82 83 84   MCH 25.8*  --  26.4*  --  27.8  --   --   --  27.7  --  27.5 27.5 27.0   MCHC 32.6  --  32.7  --  33.7  --   --   --  34.5  --  33.7 33.1 32.2   RDW 18.8*  --  18.7*  --  16.6*  --   --   --  16.9*  --  17.5* 18.2* 18.6*   PLT  "818*  --  560*  --  482*  --   --   --  476*  --  498* 498* 501*   MPV 10.7  --  10.7  --  10.1  --   --   --  10.4  --  10.6 10.5 11.1   GRAN 55.0   < > 83.0*  20.7*  --  86.8*  20.9*  --   --   --  88.6*  17.4*  --  86.6*  24.6* 80.7*  21.3* 77.0*   LYMPH 8.0*   < > 9.3*  2.3  --  5.7*  1.4  --   --   --  4.4*  0.9*  --  5.0*  1.4 5.9*  1.6 6.0*   MONO 5.0   < > 5.8  1.5*  --  4.7  1.1*  --   --   --  4.6  0.9  --  5.7  1.6* 7.9  2.1* 6.0   BASO  --   --  0.05  --  0.09  --   --   --  0.05  --  0.13 0.10  --    NRBC 0  --  0  --  0  --   --   --  0  --  0 0 0    < > = values in this interval not displayed.       CHEMISTRY LAST 7  Recent Labs   Lab 04/25/24  1223 04/25/24  2146 04/26/24  0405 04/27/24  0615 04/28/24  0455 04/28/24  1333 04/28/24  1758 04/29/24  0628 04/30/24  0649   * 135* 136 145 144  --   --  142 140   K 5.9* 4.1 4.3 3.2* 3.4* 4.6  --  4.6 4.7   CL 96 104 101 111* 109  --   --  110 109   CO2 18* 11* 20* 22* 25  --   --  26 24   ANIONGAP 19* 20* 15 12 10  --   --  6* 7*   BUN 93* 84* 96* 69* 56*  --   --  39* 35*   CREATININE 2.5* 2.7* 3.1* 1.9* 1.4  --   --  1.0 0.9   * 302* 241* 67* 148*  --   --  209* 247*   CALCIUM 9.7 7.4* 8.3* 8.6* 9.2  --   --  8.8 8.8   MG 2.2  --  2.0 1.9 1.8  --  1.7 1.6 1.8   ALBUMIN 3.3*  --  2.7* 2.6* 2.9*  --   --  2.7* 2.7*   PROT 8.0  --  6.6 6.5 7.4  --   --  6.8 7.0   ALKPHOS 89  --  68 84 152*  --   --  147* 165*   ALT 15  --  10 16 86*  --   --  60* 63*   AST 18  --  19 37 102*  --   --  51* 47*   BILITOT 0.6  --  0.9 0.6 0.6  --   --  0.4 0.3       Estimated Creatinine Clearance: 47.4 mL/min (based on SCr of 0.9 mg/dL).    INFLAMMATORY/PROCAL  LAST 7  Recent Labs   Lab 04/29/24  0628 04/30/24  0649   PROCAL  --  8.983*   CRP >16.00*  --      No results found for: "ESR"  CRP   Date Value Ref Range Status   04/29/2024 >16.00 (H) <1.00 mg/dL Final     Comment:     CRP-Normal Application expected values:   <1.0        mg/dL   Normal " Range  1.0 - 5.0  mg/dL   Indicates mild inflammation  5.0 - 10.0 mg/dL   Indicates severe inflammation  >10.0        mg/dL   Represents serious processes and   frequently         indicates the presence of a bacterial   infection.      12/11/2023 55.1 (H) 0.0 - 8.2 mg/L Final   12/09/2023 143.3 (H) 0.0 - 8.2 mg/L Final   12/06/2023 293.0 (H) 0.0 - 8.2 mg/L Final   10/25/2023 92.6 (H) 0.0 - 8.2 mg/L Final   06/15/2023 0.65 <0.76 mg/dL Final   11/23/2021 3.21 (H) <0.76 mg/dL Final       PRIOR  MICROBIOLOGY:  Reviewed    Susceptibility data from last 90 days.  Collected Specimen Info Organism Amp/Sulbactam Ampicillin Cefepime Ceftriaxone Ciprofloxacin Clindamycin Ertapenem Erythromycin Gentamicin Levofloxacin Meropenem Nitrofurantoin Oxacillin Penicillin   04/25/24 Urine Providencia stuartii  I   S  S  R   S   R  R  S        KLEBSIELLA PNEUMONIAE   R  R  R  R  R   R   S  I  R  R     04/25/24 Blood Staphylococcus haemolyticus     R   R   R      R  R   04/25/24 Blood Staphylococcus haemolyticus                 02/23/24 Blood from Peripheral, Antecubital, Left No growth after 5 days.                 02/23/24 Blood from Peripheral, Antecubital, Left No growth after 5 days.                 02/23/24 Urine Candida tropicalis                   Collected Specimen Info Organism PIPERACILLIN/TAZO SUSCEPTIBILITY Tetracycline Tobramycin Trimeth/Sulfa Vancomycin   04/25/24 Urine Providencia stuartii  S   R  S      KLEBSIELLA PNEUMONIAE   R  R  I  R    04/25/24 Blood Staphylococcus haemolyticus   R   S  S   04/25/24 Blood Staphylococcus haemolyticus        02/23/24 Blood from Peripheral, Antecubital, Left No growth after 5 days.        02/23/24 Blood from Peripheral, Antecubital, Left No growth after 5 days.        02/23/24 Urine Candida tropicalis              LAST 7 DAYS MICROBIOLOGY   Microbiology Results (last 7 days)       Procedure Component Value Units Date/Time    Blood culture [[id number]] Collected: 04/29/24 0628     Order Status: Completed Specimen: Blood from Peripheral, Hand, Right Updated: 04/30/24 0832     Blood Culture, Routine No Growth to date      No Growth to date    Urine culture [[id number]] Collected: 04/29/24 1058    Order Status: Completed Specimen: Urine Updated: 04/30/24 0653     Urine Culture, Routine No growth to date    Narrative:      Specimen Source->Urine    Blood culture x two cultures. Draw prior to antibiotics. [[id number]]  (Abnormal) Collected: 04/25/24 1223    Order Status: Completed Specimen: Blood Updated: 04/29/24 1124     Blood Culture, Routine Gram stain aer bottle: Gram positive cocci      Results called to and read back by:Octavio Rodriguez RN-ED;  04/26/2024      07:56 CJD      STAPHYLOCOCCUS HAEMOLYTICUS  For susceptibility see order #[id number]      Narrative:      Aerobic and anaerobic    Blood culture x two cultures. Draw prior to antibiotics. [[id number]]  (Abnormal)  (Susceptibility) Collected: 04/25/24 1247    Order Status: Completed Specimen: Blood Updated: 04/29/24 1124     Blood Culture, Routine Gram stain aer bottle: Gram positive cocci      Results called to and read back by:Octavio Rodriguez RN-ED;  04/26/2024      07:35 CJD      STAPHYLOCOCCUS HAEMOLYTICUS    Narrative:      Aerobic and anaerobic    Urine culture [[id number]]  (Abnormal)  (Susceptibility) Collected: 04/25/24 1434    Order Status: Completed Specimen: Urine Updated: 04/29/24 1051     Urine Culture, Routine PROVIDENCIA STUARTII  > 100,000 cfu/ml        KLEBSIELLA PNEUMONIAE   > 100,000 cfu/ml  MDRO  Results called to and read back by Miracle Callahan RN-NIURKA;    04/28/2024  12:16 CJD       Comment: Previous value was 2015 verified by I/AUT at 06:27 on 04/28/2024       Narrative:      Specimen Source->Urine    Stool culture **cannot be ordered stat** [[id number]] Collected: 04/25/24 1526    Order Status: Completed Specimen: Stool Updated: 04/27/24 1341     Stool Culture No Salmonella,Shigella,Vibrio,Campylobacter.       No E coli 0157:H7 isolated.    Rapid Organism ID by PCR (from Blood culture) [[id number]]  (Abnormal) Collected: 04/25/24 1247    Order Status: Completed Updated: 04/26/24 0826     Enterococcus faecalis Not Detected     Enterococcus faecium Not Detected     Listeria monocytogenes Not Detected     Staphylococcus spp. Detected     Staphylococcus aureus Not Detected     Staphylococcus epidermidis Not Detected     Staphylococcus lugdunensis Not Detected     Streptococcus species Not Detected     Streptococcus agalactiae Not Detected     Streptococcus pneumoniae Not Detected     Streptococcus pyogenes Not Detected     Acinetobacter calcoaceticus/baumannii complex Not Detected     Bacteroides fragilis Not Detected     Enterobacterales Not Detected     Enterobacter cloacae complex Not Detected     Escherichia coli Not Detected     Klebsiella aerogenes Not Detected     Klebsiella oxytoca Not Detected     Klebsiella pneumoniae group Not Detected     Proteus Not Detected     Salmonella sp Not Detected     Serratia marcescens Not Detected     Haemophilus influenzae Not Detected     Neisseria meningtidis Not Detected     Pseudomonas aeruginosa Not Detected     Stenotrophomonas maltophilia Not Detected     Candida albicans Not Detected     Candida auris Not Detected     Candida glabrata Not Detected     Candida krusei Not Detected     Candida parapsilosis Not Detected     Candida tropicalis Not Detected     Cryptococcus neoformans/gattii Not Detected     CTX-M (ESBL ) Test not applicable     IMP (Carbapenem resistant) Test not applicable     KPC resistance gene (Carbapenem resistant) Test not applicable     mcr-1  Test not applicable     mec A/C  Test not applicable     mec A/C and MREJ (MRSA) gene Test not applicable     NDM (Carbapenem resistant) Test not applicable     OXA-48-like (Carbapenem resistant) Test not applicable     van A/B (VRE gene) Test not applicable     VIM (Carbapenem resistant) Test not  applicable    Narrative:      Aerobic and anaerobic    C Diff Toxin by PCR [[id number]] Collected: 04/25/24 1526    Order Status: Completed Updated: 04/25/24 2104     C. diff PCR Negative    Clostridium difficile EIA [[id number]]  (Abnormal) Collected: 04/25/24 1526    Order Status: Completed Specimen: Stool Updated: 04/25/24 1624     C. diff Antigen Positive     C difficile Toxins A+B, EIA Negative     Comment: Testing not recommended for children <24 months old.             CURRENT/PREVIOUS VISIT EKG  Results for orders placed or performed during the hospital encounter of 04/25/24   EKG 12-lead    Collection Time: 04/25/24 12:25 PM   Result Value Ref Range    QRS Duration 70 ms    [facility] QTC Calculation 454 ms    Narrative    Test Reason : A41.9,    Vent. Rate : 104 BPM     Atrial Rate : 104 BPM     P-R Int : 138 ms          QRS Dur : 070 ms      QT Int : 346 ms       P-R-T Axes : 056 029 078 degrees     QTc Int : 454 ms    Sinus tachycardia  Cannot rule out Anterior infarct ,age undetermined  Abnormal ECG  When compared with ECG of 23-FEB-2024 14:49,  Criteria for Inferior infarct are no longer Present  Confirmed by Marjan PALOMO, Blade NICHOLE ([id number]) on 4/25/2024 8:15:12 PM    Referred By: [email or web address]   SELF           Confirmed By:Blade Phillip MD     Significant Imaging: I have reviewed all relevant and available imaging results/findings within the past 24 hours.    X-Ray Chest AP Portable 04/25/24 1323    Mild faint nonspecific infrahilar interstitial opacity, new from the previous exam suggesting very mild aspiration/bronchitis/pneumonia or trace edema in the appropriate clinical setting.    X-Ray Chest AP Portable 04/25/24 1504     Interval insertion of a nasogastric tube as described.    CT Chest Abdomen Pelvis Without Contrast  04/25/24 1610   1. Scattered nonspecific bronchiolitis in both lungs, with right lower lobes opacities potentially reflecting chronic bronchopneumonia, given similar appearance to CT of  12/11/2023.   2. Additional stable observations as described.    X-Ray Chest AP Portable 04/25/24 1815   No significant interval detrimental change in the radiographic appearance of the chest as compared with the prior exam on the same date, 04/25/2024.  Nasogastric tube in place.    X-Ray Chest AP Portable 04/26/24 1128    Mild hazy opacification of the right lung base with blunting of the costophrenic angle.  This could reflect atelectasis, infiltrate, pleural effusion or combination there of.    X-Ray Chest AP Portable 04/27/24 1059   Small right pleural effusion and right basilar airspace disease, worsened compared to prior.   Subsegmental atelectasis left lung base.   Interval removal of enteric tube.      Roberta Samaniego NP  Date of Service: 04/30/2024      This note was created using [employer] voice recognition software that occasionally misinterpreted phrases or words.

## 2024-04-30 NOTE — SUBJECTIVE & OBJECTIVE
Interval History:  Up in bed, awake and alert.  Makes eye contact when engaged.  Nonverbal.  Does not follow simple commands.  No overnight events.  Started Avycaz 4/29.  T-max 100.9° No acute issues currently.    Review of Systems   Unable to perform ROS: Patient nonverbal     Objective:     Vital Signs (Most Recent):  Temp: 98.7 °F (37.1 °C) (04/30/24 1125)  Pulse: 77 (04/30/24 1125)  Resp: 18 (04/30/24 1125)  BP: (!) 153/70 (04/30/24 1125)  SpO2: 98 % (04/30/24 1125) Vital Signs (24h Range):  Temp:  [97.6 °F (36.4 °C)-100 °F (37.8 °C)] 98.7 °F (37.1 °C)  Pulse:  [77-98] 77  Resp:  [18-19] 18  SpO2:  [94 %-100 %] 98 %  BP: (148-168)/(70-91) 153/70     Weight: 53.5 kg (118 lb)  Body mass index is 23.83 kg/m².    Intake/Output Summary (Last 24 hours) at 4/30/2024 1437  Last data filed at 4/30/2024 0649  Gross per 24 hour   Intake 700 ml   Output 1350 ml   Net -650 ml         Physical Exam  Constitutional:       General: She is not in acute distress.  HENT:      Head: Normocephalic and atraumatic.   Cardiovascular:      Rate and Rhythm: Normal rate and regular rhythm.   Pulmonary:      Effort: Pulmonary effort is normal.      Breath sounds: Examination of the right-lower field reveals decreased breath sounds. Examination of the left-lower field reveals decreased breath sounds. Decreased breath sounds present.   Genitourinary:     Comments: Mathur catheter intact with dark conner urine.  Fecal management system and use with liquid brown stool.  Musculoskeletal:      Cervical back: Normal range of motion and neck supple.   Neurological:      Mental Status: She is alert.      Comments: Nonverbal, makes good eye contact   Psychiatric:         Mood and Affect: Affect is flat.             Significant Labs: All pertinent labs within the past 24 hours have been reviewed.  CBC:   Recent Labs   Lab 04/29/24  0628 04/30/24  0649   WBC 26.35* 23.17*   HGB 8.6* 9.2*   HCT 26.0* 28.6*   * 501*     CMP:   Recent Labs   Lab  04/29/24  0628 04/30/24  0649    140   K 4.6 4.7    109   CO2 26 24   * 247*   BUN 39* 35*   CREATININE 1.0 0.9   CALCIUM 8.8 8.8   PROT 6.8 7.0   ALBUMIN 2.7* 2.7*   BILITOT 0.4 0.3   ALKPHOS 147* 165*   AST 51* 47*   ALT 60* 63*   ANIONGAP 6* 7*     Magnesium:   Recent Labs   Lab 04/28/24  1758 04/29/24  0628 04/30/24  0649   MG 1.7 1.6 1.8     Component Ref Range & Units 04/30/24 0649   Procalcitonin 0.000 - 0.500 ng/mL 8.983     Urine Studies:   Recent Labs   Lab 04/29/24  1058   COLORU Yellow   APPEARANCEUA Hazy*   PHUR >8.0*   SPECGRAV 1.015   PROTEINUA 1+*   GLUCUA Negative   KETONESU Negative   BILIRUBINUA Negative   OCCULTUA 1+*   NITRITE Negative   UROBILINOGEN Negative   LEUKOCYTESUR 3+*   RBCUA 16*   WBCUA >100*   BACTERIA Occasional   HYALINECASTS 0      Microbiology Results (last 7 days)       Procedure Component Value Units Date/Time    MRSA Screen by PCR [[id number]]     Order Status: No result Specimen: Nasal Swab     Blood culture [[id number]]     Order Status: Sent Specimen: Blood     Blood culture [[id number]]     Order Status: Sent Specimen: Blood     Blood culture [[id number]] Collected: 04/29/24 0628    Order Status: Completed Specimen: Blood from Peripheral, Hand, Right Updated: 04/30/24 0832     Blood Culture, Routine No Growth to date      No Growth to date    Urine culture [[id number]] Collected: 04/29/24 1058    Order Status: Completed Specimen: Urine Updated: 04/30/24 0653     Urine Culture, Routine No growth to date    Narrative:      Specimen Source->Urine    Blood culture x two cultures. Draw prior to antibiotics. [[id number]]  (Abnormal) Collected: 04/25/24 1223    Order Status: Completed Specimen: Blood Updated: 04/29/24 1124     Blood Culture, Routine Gram stain aer bottle: Gram positive cocci      Results called to and read back by:Octavio Rodriguez RN-ED;  04/26/2024      07:56 CJD      STAPHYLOCOCCUS HAEMOLYTICUS  For susceptibility see order #[id number]       Narrative:      Aerobic and anaerobic    Blood culture x two cultures. Draw prior to antibiotics. [[id number]]  (Abnormal)  (Susceptibility) Collected: 04/25/24 1247    Order Status: Completed Specimen: Blood Updated: 04/29/24 1124     Blood Culture, Routine Gram stain aer bottle: Gram positive cocci      Results called to and read back by:Octavio Rodriguez RN-ED;  04/26/2024      07:35 CJD      STAPHYLOCOCCUS HAEMOLYTICUS    Narrative:      Aerobic and anaerobic    Urine culture [[id number]]  (Abnormal)  (Susceptibility) Collected: 04/25/24 1434    Order Status: Completed Specimen: Urine Updated: 04/29/24 1051     Urine Culture, Routine PROVIDENCIA STUARTII  > 100,000 cfu/ml        KLEBSIELLA PNEUMONIAE   > 100,000 cfu/ml  MDRO  Results called to and read back by Miracle Callahan RN-[facility];    04/28/2024  12:16 CJD       Comment: Previous value was 2015 verified by I/AUT at 06:27 on 04/28/2024       Narrative:      Specimen Source->Urine    Stool culture **cannot be ordered stat** [[id number]] Collected: 04/25/24 1526    Order Status: Completed Specimen: Stool Updated: 04/27/24 1341     Stool Culture No Salmonella,Shigella,Vibrio,Campylobacter.      No E coli 0157:H7 isolated.    Rapid Organism ID by PCR (from Blood culture) [[id number]]  (Abnormal) Collected: 04/25/24 1247    Order Status: Completed Updated: 04/26/24 0826     Enterococcus faecalis Not Detected     Enterococcus faecium Not Detected     Listeria monocytogenes Not Detected     Staphylococcus spp. Detected     Staphylococcus aureus Not Detected     Staphylococcus epidermidis Not Detected     Staphylococcus lugdunensis Not Detected     Streptococcus species Not Detected     Streptococcus agalactiae Not Detected     Streptococcus pneumoniae Not Detected     Streptococcus pyogenes Not Detected     Acinetobacter calcoaceticus/baumannii complex Not Detected     Bacteroides fragilis Not Detected     Enterobacterales Not Detected     Enterobacter cloacae  complex Not Detected     Escherichia coli Not Detected     Klebsiella aerogenes Not Detected     Klebsiella oxytoca Not Detected     Klebsiella pneumoniae group Not Detected     Proteus Not Detected     Salmonella sp Not Detected     Serratia marcescens Not Detected     Haemophilus influenzae Not Detected     Neisseria meningtidis Not Detected     Pseudomonas aeruginosa Not Detected     Stenotrophomonas maltophilia Not Detected     Candida albicans Not Detected     Candida auris Not Detected     Candida glabrata Not Detected     Candida krusei Not Detected     Candida parapsilosis Not Detected     Candida tropicalis Not Detected     Cryptococcus neoformans/gattii Not Detected     CTX-M (ESBL ) Test not applicable     IMP (Carbapenem resistant) Test not applicable     KPC resistance gene (Carbapenem resistant) Test not applicable     mcr-1  Test not applicable     mec A/C  Test not applicable     mec A/C and MREJ (MRSA) gene Test not applicable     NDM (Carbapenem resistant) Test not applicable     OXA-48-like (Carbapenem resistant) Test not applicable     van A/B (VRE gene) Test not applicable     VIM (Carbapenem resistant) Test not applicable    Narrative:      Aerobic and anaerobic    C Diff Toxin by PCR [[id number]] Collected: 04/25/24 1526    Order Status: Completed Updated: 04/25/24 2104     C. diff PCR Negative    Clostridium difficile EIA [[id number]]  (Abnormal) Collected: 04/25/24 1526    Order Status: Completed Specimen: Stool Updated: 04/25/24 1624     C. diff Antigen Positive     C difficile Toxins A+B, EIA Negative     Comment: Testing not recommended for children <24 months old.                 Significant Imaging:  Not

## 2024-04-30 NOTE — CONSULTS
Chief complaint:  GI Problem (Brown fluid being withdrawn from peg tube, vomiting brown fluid. Smells of feces. Since this morning. Pt is nonverbal)      HPI:  Steff Johnson is a 64 y.o. female presenting with a stage 3 sacral pressure ulcer that was POA. Pt does not know how long ago the wound started. Minimal pain in the area. No other complaints today    PMH:  As per HPI and below:  Past Medical History:   Diagnosis Date    Arthritis     Complete tear of left rotator cuff 11/09/2017    COPD (chronic obstructive pulmonary disease)     COVID-19 infection 07/02/2021    Diabetes mellitus     H/o Cerebrovascular accident (CVA) of left pontine structure 12/12/2019    Hypertension     Personal history of colonic polyps     Stroke     Wears glasses        Social History     Socioeconomic History    Marital status: Single   Occupational History     Comment: disabled due to shoulder problems   Tobacco Use    Smoking status: Never    Smokeless tobacco: Never   Substance and Sexual Activity    Alcohol use: No    Drug use: No    Sexual activity: Not Currently     Social Determinants of Health     Financial Resource Strain: Patient Unable To Answer (4/27/2024)    Overall Financial Resource Strain (CARDIA)     Difficulty of Paying Living Expenses: Patient unable to answer   Food Insecurity: Patient Unable To Answer (4/27/2024)    Hunger Vital Sign     Worried About Running Out of Food in the Last Year: Patient unable to answer     Ran Out of Food in the Last Year: Patient unable to answer   Transportation Needs: Patient Unable To Answer (4/27/2024)    PRAPARE - Transportation     Lack of Transportation (Medical): Patient unable to answer     Lack of Transportation (Non-Medical): Patient unable to answer   Physical Activity: Sufficiently Active (12/6/2023)    Exercise Vital Sign     Days of Exercise per Week: 5 days     Minutes of Exercise per Session: 150+ min   Stress: Patient Unable To Answer (4/27/2024)    [place]  [place] [employer] - Occupational Stress Questionnaire     Feeling of Stress : Patient unable to answer   Social Connections: Unknown (12/6/2023)    Social Connection and Isolation Panel [NHANES]     Frequency of Communication with Friends and Family: Once a week     Frequency of Social Gatherings with Friends and Family: Once a week     Attends Anabaptist Services: More than 4 times per year     Active Member of Clubs or Organizations: No     Attends Club or Organization Meetings: Never   Recent Concern: Social Connections - Moderately Isolated (12/6/2023)    Social Connection and Isolation Panel [NHANES]     Frequency of Communication with Friends and Family: Once a week     Frequency of Social Gatherings with Friends and Family: Once a week     Attends Anabaptist Services: More than 4 times per year     Active Member of Clubs or Organizations: No     Attends Club or Organization Meetings: Never     Marital Status: Living with partner   Housing Stability: Patient Unable To Answer (4/27/2024)    Housing Stability Vital Sign     Unable to Pay for Housing in the Last Year: Patient unable to answer     Number of Times Moved in the Last Year: 1     Homeless in the Last Year: Patient unable to answer       Past Surgical History:   Procedure Laterality Date    COLONOSCOPY N/A 4/11/2017    Procedure: COLONOSCOPY;  Surgeon: Jared Antonio MD;  Location: [facility];  Service: Endoscopy;  Laterality: N/A;    CYSTOSCOPY W/ RETROGRADES N/A 12/14/2023    Procedure: CYSTOSCOPY, WITH RETROGRADE PYELOGRAM;  Surgeon: Sergio Soria MD;  Location: [facility] OR;  Service: Urology;  Laterality: N/A;    ECHOCARDIOGRAM,TRANSESOPHAGEAL N/A 12/12/2023    Procedure: Transesophageal echo (GT) intra-procedure log documentation;  Surgeon: Antoine Rivera MD;  Location: [facility] CATH/EP LAB;  Service: Cardiology;  Laterality: N/A;    ESOPHAGOGASTRODUODENOSCOPY N/A 2/25/2024    Procedure: EGD (ESOPHAGOGASTRODUODENOSCOPY);   Surgeon: Alexandru May MD;  Location: [facility] ENDO;  Service: Endoscopy;  Laterality: N/A;    ESOPHAGOGASTRODUODENOSCOPY N/A 4/26/2024    Procedure: EGD (ESOPHAGOGASTRODUODENOSCOPY);  Surgeon: Julien Escobar III, MD;  Location: [facility] ENDO;  Service: Endoscopy;  Laterality: N/A;    HYSTERECTOMY      INSERTION OF CATHETER N/A 12/14/2023    Procedure: INSERTION, CATHETER;  Surgeon: Sergio Soria MD;  Location: [facility] OR;  Service: Urology;  Laterality: N/A;    OOPHORECTOMY      SHOULDER SURGERY      R    TRANSESOPHAGEAL ECHOCARDIOGRAM WITH POSSIBLE CARDIOVERSION (GT W/ POSS CARDIOVERSION) N/A 5/4/2023    Procedure: Transesophageal echo (GT) intra-procedure log documentation;  Surgeon: Blade Phillip MD;  Location: [facility] CATH/EP LAB;  Service: Cardiology;  Laterality: N/A;       Family History   Problem Relation Name Age of Onset    Diabetes Mother      Asthma Mother      Hypertension Mother      Diabetes Father      Diabetes Brother      Hypertension Brother      Anemia Daughter      Glaucoma Neg Hx      Macular degeneration Neg Hx      Retinal detachment Neg Hx         Review of patient's allergies indicates:  No Known Allergies    No current facility-administered medications on file prior to encounter.     Current Outpatient Medications on File Prior to Encounter   Medication Sig Dispense Refill    allopurinoL (ZYLOPRIM) 100 MG tablet 100 mg by Per G Tube route once daily.      amLODIPine (NORVASC) 10 MG tablet 1 tablet (10 mg total) by Per G Tube route once daily.      apixaban (ELIQUIS) 2.5 mg Tab 2.5 mg by Per G Tube route 2 (two) times daily.      ascorbic acid, vitamin C, (VITAMIN C) 500 MG tablet 500 mg by Per G Tube route 2 (two) times daily.      atorvastatin (LIPITOR) 80 MG tablet 1 tablet (80 mg total) by Per G Tube route every evening. 30 tablet 0    folic acid (FOLVITE) 1 MG tablet 1 tablet by Per G Tube route every morning.      furosemide (LASIX) 40 MG tablet 40 mg by Per G Tube route  once daily.      HUMALOG KWIKPEN INSULIN 100 unit/mL pen Inject 0-20 Units into the skin As instructed (inject 4 times daily per sliding scale). 0 -149 = 0 units  150 - 199 = 4 units  200 - 249 = 8 units  250 - 299 = 12 units  300 - 349 = 16 units  350 - 1000 = 20 units Call MD      insulin (LANTUS SOLOSTAR U-100 INSULIN) glargine 100 units/mL SubQ pen Inject 35 Units into the skin once daily.      insulin lispro 100 unit/mL injection Inject 2 Units into the skin 3 (three) times daily before meals. 07:30  11:30  16:30      latanoprost 0.005 % ophthalmic solution Place 1 drop into both eyes once daily. (Patient taking differently: Place 1 drop into both eyes every evening.) 7.5 mL 6    levETIRAcetam (KEPPRA) 100 mg/mL Soln 500 mg by Per G Tube route 2 (two) times daily.      losartan (COZAAR) 100 MG tablet 1 tablet (100 mg total) by Per G Tube route once daily.      magnesium oxide (MAG-OX) 400 mg (241.3 mg magnesium) tablet 400 mg by Per G Tube route once daily.      metFORMIN (GLUCOPHAGE) 1000 MG tablet Take 1,000 mg by mouth 2 (two) times daily.      metoclopramide HCl (REGLAN) 5 mg/5 mL Soln 10 mg by Per G Tube route 3 (three) times daily.      metoprolol tartrate (LOPRESSOR) 25 MG tablet 25 mg by Per G Tube route 2 (two) times daily.      multivitamin (THERAGRAN) per tablet 1 tablet by Per G Tube route once daily.      omeprazole (PRILOSEC) 20 MG capsule Take 20 mg by mouth once daily. Per G-Tube      polyethylene glycol (GLYCOLAX) 17 gram PwPk 17 g by Per G Tube route once daily.      thiamine (VITAMIN B-1) 100 MG tablet 100 mg by Per G Tube route once daily.      zinc sulfate (ZINC-220 ORAL) 1 tablet by PEG Tube route Daily.      aspirin 81 MG Chew 1 tablet (81 mg total) by Per G Tube route once daily.      baclofen (LIORESAL) 5 mg Tab tablet 5 mg by Per G Tube route every 8 (eight) hours as needed (muscle spasms).      cloNIDine (CATAPRES) 0.1 MG tablet 1 tablet (0.1 mg total) by Per G Tube route 3 (three)  "times daily. 90 tablet 11    tiotropium (SPIRIVA) 18 mcg inhalation capsule Inhale 1 capsule (18 mcg total) into the lungs once daily. Controller 30 capsule 0    [DISCONTINUED] blood-glucose meter (TRUE METRIX GLUCOSE METER) kit Use as instructed 1 each 0    [DISCONTINUED] lancing device (TRUEDRAW LANCING DEVICE) Misc Inject 1 Device into the skin 2 (two) times daily. 1 each 3       ROS: As per HPI and below:  Pertinent items are noted in HPI.      Physical Exam:     Vitals:    24 1600 246 24 2100 24   BP: (!) 166/91  (!) 168/74    Pulse: 88  98    Resp: 18  18    Temp: 99.1 °F (37.3 °C)  100 °F (37.8 °C) 100 °F (37.8 °C)   TempSrc: Oral  Oral    SpO2: 96% 95% 95%    Weight:       Height:           BP  Min: 101/59  Max: 169/78  Temp  Av.8 °F (37.7 °C)  Min: 97.8 °F (36.6 °C)  Max: 102.3 °F (39.1 °C)  Pulse  Av.3  Min: 63  Max: 111  Resp  Av.9  Min: 14  Max: 38  SpO2  Av.6 %  Min: 86 %  Max: 100 %  Height  Av' 11" (149.9 cm)  Min: 4' 11" (149.9 cm)  Max: 4' 11" (149.9 cm)  Weight  Av.5 kg (118 lb)  Min: 53.5 kg (118 lb)  Max: 53.5 kg (118 lb)    Body mass index is 23.83 kg/m².          General:             Well developed, well nourished, no apparent distress  HEENT:              NCAT, no JVD, mucous membranes moist, EOM intact  Cardiovascular:  Regular rate and rhythm, normal S1, normal S2, No murmurs, rubs, or gallops  Respiratory:        Normal breath sounds, no wheezes, no rales, no rhonchi  Abdomen:           Bowel sounds present, non tender, non distended, no masses, no hepatojugular reflux  Extremities:        No clubbing, no cyanosis, no edema  Vascular:            2+ b/l radial.  Peripheral pulses intact.  No carotid bruits.  Neurological:      No focal deficits  Skin:                   No obvious rashes or erythema, stage 3 sacral pressure ulcer      Lab Results   Component Value Date    WBC 26.35 (H) 2024    HGB 8.6 (L) 2024    HCT " 26.0 (L) 04/29/2024    MCV 83 04/29/2024     (H) 04/29/2024     Lab Results   Component Value Date    CHOL 92 (L) 08/04/2023    CHOL 157 05/02/2023    CHOL 156 02/22/2023     Lab Results   Component Value Date    HDL 38 (L) 08/04/2023    HDL 73 05/02/2023    HDL 56 02/22/2023     Lab Results   Component Value Date    LDLCALC 41.6 (L) 08/04/2023    LDLCALC 65.4 05/02/2023    LDLCALC 85.6 02/22/2023     Lab Results   Component Value Date    TRIG 62 08/04/2023    TRIG 93 05/02/2023    TRIG 72 02/22/2023     Lab Results   Component Value Date    CHOLHDL 41.3 08/04/2023    CHOLHDL 46.5 05/02/2023    CHOLHDL 35.9 02/22/2023     CMP  Recent Labs   Lab 04/29/24  0628   *   CALCIUM 8.8   ALBUMIN 2.7*   PROT 6.8      K 4.6   CO2 26      BUN 39*   CREATININE 1.0   ALKPHOS 147*   ALT 60*   AST 51*   BILITOT 0.4      Lab Results   Component Value Date    TSH 1.908 10/24/2023           Assessment and Recommendations       Diagnoses:    1. Stage 3 sacral pressure ulcer    Plan:  1.   Clean with chlorhexidine/ns.  Pat dry. Apply Medihoney and cover with mepilex daily      Complexity:    moderate

## 2024-04-30 NOTE — PROGRESS NOTES
INPATIENT NEPHROLOGY Progress Note  [facility]    Patient Name: Steff Johnson  Date: 04/30/2024    Reason for consultation: COURTNEY    Chief Complaint:   Chief Complaint   Patient presents with    GI Problem     Brown fluid being withdrawn from peg tube, vomiting brown fluid. Smells of feces. Since this morning. Pt is nonverbal       History of Present Illness:  65 yo female with PMH of CVA with known baseline confusion and aphasia who was sent to the ER because of black drainage coming from around the PEG tube site and having black vomit. Pt started to have projectile hematemesis in the ER. NGT was placed. Mathur was replaced after which pt developed hematuria. UA was grossly positive. C.diffi antigen is positive. CT C/A/P showed scattered nonspecific bronchiolitis in both lungs, with right lower lobes opacities potentially reflecting chronic bronchopneumonia, given similar appearance to CT of 12/11/2023. Consulted for COURTNEY.    Interval History:  4/26- febrile and hypotensive, at admission, on 3L NC, UOP 500cc, Hb 6.2- got 2u of blood- Hb improved  4/27- went for EGD yest- febrile, BP stable, on 4L NC, UOP 1.5L  4/28- low grade T, BP not high (got clonidine overnight), on 2L NC, UOP 2.3L, leukocytosis is worse, renal function is better, new transaminitis   4/29  2.3L UOP recorded, Tmax 102.2, pressures stable.  Renal function improving, liver enzymes trended down.  K+ at goal,  Mg+ low, has PRN repletion orders.   4/30   pressures stable, Tmax 100.   renal function improving.  UOP 1.7L.    Plan of Care:    Assessment:  COURTNEY  Acute GIB  C diff diarrhea  Hypernatremia/Hypokalemia/Acidosis (lactic)  HypoMg  Hx of HTN    Plan:    - suspect COURTNEY due to hemodynamic injury from hypotension, GI losses, and ABLA  - she is s/p IVFs, antbx and is s/p blood transfusion  - renal function is improved- nonoliguric  - now off IVFs  - K, Mg repletion per PRN orders  - acidosis is resolved  - dose meds for CrCl  30-60  - no nsaids or IV contrast- strict ins/outs via [place]  - resumed [facility] 4/28- hold losartan, lasix today    Thank you for allowing us to participate in this patient's care. We will continue to follow.    Vital Signs:  Temp Readings from Last 3 Encounters:   04/30/24 98.2 °F (36.8 °C) (Axillary)   02/29/24 98.9 °F (37.2 °C) (Oral)   02/14/24 98.5 °F (36.9 °C) (Oral)       Pulse Readings from Last 3 Encounters:   04/30/24 85   04/26/24 101   02/29/24 74       BP Readings from Last 3 Encounters:   04/30/24 (!) 166/74   04/26/24 (!) 156/83   02/29/24 139/74       Weight:  Wt Readings from Last 3 Encounters:   04/26/24 53.5 kg (118 lb)   02/23/24 54.2 kg (119 lb 7.8 oz)   02/14/24 61.7 kg (136 lb)       INS/OUTS:  I/O last 3 completed shifts:  In: 1300 [NG/GT:900; IV Piggyback:400]  Out: 3200 [Urine:3100; Stool:100]  No intake/output data recorded.    Medications:  Scheduled Meds:  Current Facility-Administered Medications   Medication Dose Route Frequency    amLODIPine  10 mg Per G Tube Daily    ceftazidime-avibactam (AVYCAZ) IVPB  1.25 g Intravenous Q8H    insulin aspart U-100  0-5 Units Subcutaneous Q4H    insulin detemir U-100  15 Units Subcutaneous QHS    levETIRAcetam (Keppra) IV (PEDS and ADULTS)  500 mg Intravenous Q12H    mupirocin   Nasal BID    pantoprazole  40 mg Intravenous BID    scopolamine  1 patch Transdermal Q3 Days    vancomycin  125 mg Per G Tube Daily     Continuous Infusions:  Current Facility-Administered Medications   Medication Dose Route Frequency Last Rate Last Admin     PRN Meds:.  Current Facility-Administered Medications:     0.9%  NaCl infusion (for blood administration), , Intravenous, Q24H PRN    acetaminophen, 650 mg, Oral, Q4H PRN    aluminum-magnesium hydroxide-simethicone, 30 mL, Oral, QID PRN    [COMPLETED] calcium gluconate IVPB, 1 g, Intravenous, ED 1 Time **AND** calcium gluconate IVPB, 1 g, Intravenous, Q10 Min PRN    cloNIDine, 0.1 mg, Oral, Q8H PRN    dextrose 50%, 12.5  g, Intravenous, PRN    dextrose 50%, 25 g, Intravenous, PRN    glucagon (human recombinant), 1 mg, Intramuscular, PRN    glucose, 16 g, Oral, PRN    glucose, 24 g, Oral, PRN    HYDROcodone-acetaminophen, 1 tablet, Oral, Q6H PRN    magnesium oxide, 800 mg, Oral, PRN    magnesium oxide, 800 mg, Oral, PRN    melatonin, 6 mg, Oral, Nightly PRN    naloxone, 0.02 mg, Intravenous, PRN    ondansetron, 4 mg, Intravenous, Q6H PRN    potassium bicarbonate, 35 mEq, Oral, PRN    potassium bicarbonate, 50 mEq, Oral, PRN    potassium bicarbonate, 60 mEq, Oral, PRN    potassium, sodium phosphates, 2 packet, Oral, PRN    potassium, sodium phosphates, 2 packet, Oral, PRN    potassium, sodium phosphates, 2 packet, Oral, PRN    sodium chloride 0.9%, 10 mL, Intravenous, Q12H PRN  No current facility-administered medications on file prior to encounter.     Current Outpatient Medications on File Prior to Encounter   Medication Sig Dispense Refill    allopurinoL (ZYLOPRIM) 100 MG tablet 100 mg by Per G Tube route once daily.      amLODIPine (NORVASC) 10 MG tablet 1 tablet (10 mg total) by Per G Tube route once daily.      apixaban (ELIQUIS) 2.5 mg Tab 2.5 mg by Per G Tube route 2 (two) times daily.      ascorbic acid, vitamin C, (VITAMIN C) 500 MG tablet 500 mg by Per G Tube route 2 (two) times daily.      atorvastatin (LIPITOR) 80 MG tablet 1 tablet (80 mg total) by Per G Tube route every evening. 30 tablet 0    folic acid (FOLVITE) 1 MG tablet 1 tablet by Per G Tube route every morning.      furosemide (LASIX) 40 MG tablet 40 mg by Per G Tube route once daily.      HUMALOG KWIKPEN INSULIN 100 unit/mL pen Inject 0-20 Units into the skin As instructed (inject 4 times daily per sliding scale). 0 -149 = 0 units  150 - 199 = 4 units  200 - 249 = 8 units  250 - 299 = 12 units  300 - 349 = 16 units  350 - 1000 = 20 units Call MD      insulin (LANTUS SOLOSTAR U-100 INSULIN) glargine 100 units/mL SubQ pen Inject 35 Units into the skin once daily.       insulin lispro 100 unit/mL injection Inject 2 Units into the skin 3 (three) times daily before meals. 07:30  11:30  16:30      latanoprost 0.005 % ophthalmic solution Place 1 drop into both eyes once daily. (Patient taking differently: Place 1 drop into both eyes every evening.) 7.5 mL 6    levETIRAcetam (KEPPRA) 100 mg/mL Soln 500 mg by Per G Tube route 2 (two) times daily.      losartan (COZAAR) 100 MG tablet 1 tablet (100 mg total) by Per G Tube route once daily.      magnesium oxide (MAG-OX) 400 mg (241.3 mg magnesium) tablet 400 mg by Per G Tube route once daily.      metFORMIN (GLUCOPHAGE) 1000 MG tablet Take 1,000 mg by mouth 2 (two) times daily.      metoclopramide HCl (REGLAN) 5 mg/5 mL Soln 10 mg by Per G Tube route 3 (three) times daily.      metoprolol tartrate (LOPRESSOR) 25 MG tablet 25 mg by Per G Tube route 2 (two) times daily.      multivitamin (THERAGRAN) per tablet 1 tablet by Per G Tube route once daily.      omeprazole (PRILOSEC) 20 MG capsule Take 20 mg by mouth once daily. Per G-Tube      polyethylene glycol (GLYCOLAX) 17 gram PwPk 17 g by Per G Tube route once daily.      thiamine (VITAMIN B-1) 100 MG tablet 100 mg by Per G Tube route once daily.      zinc sulfate (ZINC-220 ORAL) 1 tablet by PEG Tube route Daily.      aspirin 81 MG Chew 1 tablet (81 mg total) by Per G Tube route once daily.      baclofen (LIORESAL) 5 mg Tab tablet 5 mg by Per G Tube route every 8 (eight) hours as needed (muscle spasms).      cloNIDine (CATAPRES) 0.1 MG tablet 1 tablet (0.1 mg total) by Per G Tube route 3 (three) times daily. 90 tablet 11    tiotropium (SPIRIVA) 18 mcg inhalation capsule Inhale 1 capsule (18 mcg total) into the lungs once daily. Controller 30 capsule 0    [DISCONTINUED] blood-glucose meter (TRUE METRIX GLUCOSE METER) kit Use as instructed 1 each 0    [DISCONTINUED] lancing device (TRUEDRAW LANCING DEVICE) Misc Inject 1 Device into the skin 2 (two) times daily. 1 each 3     Review of  "Systems:  Neg    Physical Exam:  BP (!) 166/74   Pulse 85   Temp 98.2 °F (36.8 °C) (Axillary)   Resp 18   Ht 4' 11" (1.499 m)   Wt 53.5 kg (118 lb)   SpO2 97%   BMI 23.83 kg/m²     General Appearance:    Ill   Head:    Normocephalic, atraumatic   Eyes:    Closed    Mouth:   Dry MM   Back:     Cannot assess   Lungs:     Clear    Heart:    Tachy   Abdomen:     Soft, non-tender, non-distended   Extremities:   Warm and well perfused   MSK:   No joint or muscle swelling, tenderness or deformity   Skin:   Skin color, texture, turgor normal, no rashes or lesions   Neurologic/Psychiatric:   Cannot assess     Results:  Lab Results   Component Value Date     04/30/2024    K 4.7 04/30/2024     04/30/2024    CO2 24 04/30/2024    BUN 35 (H) 04/30/2024    CREATININE 0.9 04/30/2024    CALCIUM 8.8 04/30/2024    ANIONGAP 7 (L) 04/30/2024    ESTGFRAFRICA 93 09/01/2023    EGFRNONAA 59.7 (A) 05/20/2022       Lab Results   Component Value Date    CALCIUM 8.8 04/30/2024    PHOS 3.8 04/25/2024       Recent Labs   Lab 04/30/24  0649   WBC 23.17*   RBC 3.41*   HGB 9.2*   HCT 28.6*   *   MCV 84   MCH 27.0   MCHC 32.2       I have personally reviewed pertinent radiological imaging and reports.    I have spent > 35 minutes providing care for this patient for the above diagnoses. These services have included chart/data/imaging review, evaluation, exam, formulation of plan, , note preparation, and discussions with staff involved in this patient's care.    Merline Gardner NP    [facility]  [address] [place] [id number]  [phone] (p)  [phone] (f)  " negative

## 2024-04-30 NOTE — PLAN OF CARE
Problem: Enteral Nutrition  Goal: Feeding Tolerance  Intervention: Prevent and Manage Feeding Intolerance  Flowsheets (Taken 4/30/2024 8918)  Nutrition Support Management: tube feeding continued as ordered

## 2024-05-01 ENCOUNTER — CLINICAL SUPPORT (OUTPATIENT)
Dept: CARDIOLOGY | Facility: HOSPITAL | Age: 64
DRG: 698 | End: 2024-05-01
Attending: EMERGENCY MEDICINE
Payer: MEDICARE

## 2024-05-01 VITALS — HEIGHT: 59 IN | WEIGHT: 117.94 LBS | BODY MASS INDEX: 23.78 KG/M2

## 2024-05-01 LAB
ABO + RH BLD: NORMAL
ALBUMIN SERPL BCP-MCNC: 2.6 G/DL (ref 3.5–5.2)
ALP SERPL-CCNC: 136 U/L (ref 55–135)
ALT SERPL W/O P-5'-P-CCNC: 43 U/L (ref 10–44)
ANION GAP SERPL CALC-SCNC: 8 MMOL/L (ref 8–16)
ANION GAP SERPL CALC-SCNC: 9 MMOL/L (ref 8–16)
ANISOCYTOSIS BLD QL SMEAR: SLIGHT
AORTIC ROOT ANNULUS: 2.8 CM
AORTIC VALVE CUSP SEPERATION: 1.8 CM
ASCENDING AORTA: 2.6 CM
AST SERPL-CCNC: 22 U/L (ref 10–40)
AV INDEX (PROSTH): 0.94
AV MEAN GRADIENT: 4 MMHG
AV PEAK GRADIENT: 8 MMHG
AV VALVE AREA BY VELOCITY RATIO: 2.35 CM²
AV VALVE AREA: 2.65 CM²
AV VELOCITY RATIO: 0.83
BASOPHILS NFR BLD: 0 % (ref 0–1.9)
BILIRUB SERPL-MCNC: 0.3 MG/DL (ref 0.1–1)
BLD GP AB SCN CELLS X3 SERPL QL: NORMAL
BSA FOR ECHO PROCEDURE: 1.49 M2
BUN SERPL-MCNC: 23 MG/DL (ref 8–23)
BUN SERPL-MCNC: 29 MG/DL (ref 8–23)
CALCIUM SERPL-MCNC: 8.4 MG/DL (ref 8.7–10.5)
CALCIUM SERPL-MCNC: 8.6 MG/DL (ref 8.7–10.5)
CHLORIDE SERPL-SCNC: 107 MMOL/L (ref 95–110)
CHLORIDE SERPL-SCNC: 109 MMOL/L (ref 95–110)
CO2 SERPL-SCNC: 23 MMOL/L (ref 23–29)
CO2 SERPL-SCNC: 24 MMOL/L (ref 23–29)
CREAT SERPL-MCNC: 0.7 MG/DL (ref 0.5–1.4)
CREAT SERPL-MCNC: 0.7 MG/DL (ref 0.5–1.4)
CRP SERPL-MCNC: 14.4 MG/DL
CV ECHO LV RWT: 0.43 CM
DIFFERENTIAL METHOD BLD: ABNORMAL
DOP CALC AO PEAK VEL: 1.4 M/S
DOP CALC AO VTI: 31.6 CM
DOP CALC LVOT AREA: 2.8 CM2
DOP CALC LVOT DIAMETER: 1.9 CM
DOP CALC LVOT PEAK VEL: 1.16 M/S
DOP CALC LVOT STROKE VOLUME: 83.88 CM3
DOP CALC MV VTI: 31.5 CM
DOP CALCLVOT PEAK VEL VTI: 29.6 CM
E WAVE DECELERATION TIME: 190 MSEC
E/A RATIO: 1.15
E/E' RATIO: 12.32 M/S
ECHO LV POSTERIOR WALL: 0.9 CM (ref 0.6–1.1)
EOSINOPHIL NFR BLD: 3 % (ref 0–8)
ERYTHROCYTE [DISTWIDTH] IN BLOOD BY AUTOMATED COUNT: 18.6 % (ref 11.5–14.5)
EST. GFR  (NO RACE VARIABLE): >60 ML/MIN/1.73 M^2
EST. GFR  (NO RACE VARIABLE): >60 ML/MIN/1.73 M^2
FRACTIONAL SHORTENING: 40 % (ref 28–44)
GLUCOSE SERPL-MCNC: 235 MG/DL (ref 70–110)
GLUCOSE SERPL-MCNC: 242 MG/DL (ref 70–110)
GLUCOSE SERPL-MCNC: 258 MG/DL (ref 70–110)
GLUCOSE SERPL-MCNC: 299 MG/DL (ref 70–110)
GLUCOSE SERPL-MCNC: 364 MG/DL (ref 70–110)
GLUCOSE SERPL-MCNC: 390 MG/DL (ref 70–110)
HCT VFR BLD AUTO: 26.6 % (ref 37–48.5)
HGB BLD-MCNC: 8.4 G/DL (ref 12–16)
HGB BLD-MCNC: 8.5 G/DL (ref 12–16)
HYPOCHROMIA BLD QL SMEAR: ABNORMAL
IMM GRANULOCYTES # BLD AUTO: ABNORMAL K/UL (ref 0–0.04)
IMM GRANULOCYTES NFR BLD AUTO: ABNORMAL % (ref 0–0.5)
INTERVENTRICULAR SEPTUM: 0.7 CM (ref 0.6–1.1)
IVC DIAMETER: 1.5 CM
LACTATE SERPL-SCNC: 2.1 MMOL/L (ref 0.5–1.9)
LEFT ATRIUM SIZE: 3.2 CM
LEFT INTERNAL DIMENSION IN SYSTOLE: 2.5 CM (ref 2.1–4)
LEFT VENTRICLE DIASTOLIC VOLUME INDEX: 53.47 ML/M2
LEFT VENTRICLE DIASTOLIC VOLUME: 78.6 ML
LEFT VENTRICLE MASS INDEX: 69 G/M2
LEFT VENTRICLE SYSTOLIC VOLUME INDEX: 15.2 ML/M2
LEFT VENTRICLE SYSTOLIC VOLUME: 22.3 ML
LEFT VENTRICULAR INTERNAL DIMENSION IN DIASTOLE: 4.2 CM (ref 3.5–6)
LEFT VENTRICULAR MASS: 101.29 G
LV LATERAL E/E' RATIO: 10.64 M/S
LV SEPTAL E/E' RATIO: 14.63 M/S
LVOT MG: 3 MMHG
LVOT MV: 0.8 CM/S
LYMPHOCYTES NFR BLD: 9 % (ref 18–48)
MAGNESIUM SERPL-MCNC: 1.7 MG/DL (ref 1.6–2.6)
MCH RBC QN AUTO: 27.3 PG (ref 27–31)
MCHC RBC AUTO-ENTMCNC: 32 G/DL (ref 32–36)
MCV RBC AUTO: 86 FL (ref 82–98)
METAMYELOCYTES NFR BLD MANUAL: 1 %
MONOCYTES NFR BLD: 9 % (ref 4–15)
MV MEAN GRADIENT: 2 MMHG
MV PEAK A VEL: 1.02 M/S
MV PEAK E VEL: 1.17 M/S
MV PEAK GRADIENT: 5 MMHG
MV STENOSIS PRESSURE HALF TIME: 63 MS
MV VALVE AREA BY CONTINUITY EQUATION: 2.66 CM2
MV VALVE AREA P 1/2 METHOD: 3.49 CM2
NEUTROPHILS NFR BLD: 77 % (ref 38–73)
NEUTS BAND NFR BLD MANUAL: 1 %
NRBC BLD-RTO: 0 /100 WBC
OHS CV RV/LV RATIO: 0.5 CM
OHS LV EJECTION FRACTION SIMPSONS BIPLANE MOD: 58 %
PLATELET # BLD AUTO: 541 K/UL (ref 150–450)
PLATELET BLD QL SMEAR: ABNORMAL
PMV BLD AUTO: 10.7 FL (ref 9.2–12.9)
POTASSIUM SERPL-SCNC: 4.3 MMOL/L (ref 3.5–5.1)
POTASSIUM SERPL-SCNC: 4.4 MMOL/L (ref 3.5–5.1)
PROCALCITONIN SERPL IA-MCNC: 5.07 NG/ML (ref 0–0.5)
PROT SERPL-MCNC: 6.7 G/DL (ref 6–8.4)
PV MV: 0.56 M/S
PV PEAK GRADIENT: 3 MMHG
PV PEAK VELOCITY: 0.83 M/S
RA PRESSURE ESTIMATED: 3 MMHG
RBC # BLD AUTO: 3.11 M/UL (ref 4–5.4)
RIGHT VENTRICULAR END-DIASTOLIC DIMENSION: 2.1 CM
RV TISSUE DOPPLER FREE WALL SYSTOLIC VELOCITY 1 (APICAL 4 CHAMBER VIEW): 15.4 CM/S
SODIUM SERPL-SCNC: 139 MMOL/L (ref 136–145)
SODIUM SERPL-SCNC: 141 MMOL/L (ref 136–145)
SPECIMEN OUTDATE: NORMAL
TARGETS BLD QL SMEAR: ABNORMAL
TDI LATERAL: 0.11 M/S
TDI SEPTAL: 0.08 M/S
TDI: 0.1 M/S
WBC # BLD AUTO: 21.74 K/UL (ref 3.9–12.7)
Z-SCORE OF LEFT VENTRICULAR DIMENSION IN END DIASTOLE: -0.47
Z-SCORE OF LEFT VENTRICULAR DIMENSION IN END SYSTOLE: -0.71

## 2024-05-01 PROCEDURE — 99233 SBSQ HOSP IP/OBS HIGH 50: CPT | Mod: FS,,, | Performed by: NURSE PRACTITIONER

## 2024-05-01 PROCEDURE — 99900031 HC PATIENT EDUCATION (STAT)

## 2024-05-01 PROCEDURE — 85018 HEMOGLOBIN: CPT | Performed by: HOSPITALIST

## 2024-05-01 PROCEDURE — 25000003 PHARM REV CODE 250: Performed by: NURSE PRACTITIONER

## 2024-05-01 PROCEDURE — 94640 AIRWAY INHALATION TREATMENT: CPT

## 2024-05-01 PROCEDURE — 25000003 PHARM REV CODE 250: Performed by: STUDENT IN AN ORGANIZED HEALTH CARE EDUCATION/TRAINING PROGRAM

## 2024-05-01 PROCEDURE — 83605 ASSAY OF LACTIC ACID: CPT | Performed by: HOSPITALIST

## 2024-05-01 PROCEDURE — 85027 COMPLETE CBC AUTOMATED: CPT | Performed by: HOSPITALIST

## 2024-05-01 PROCEDURE — 25000003 PHARM REV CODE 250: Performed by: HOSPITALIST

## 2024-05-01 PROCEDURE — 86140 C-REACTIVE PROTEIN: CPT | Performed by: NURSE PRACTITIONER

## 2024-05-01 PROCEDURE — 12000002 HC ACUTE/MED SURGE SEMI-PRIVATE ROOM

## 2024-05-01 PROCEDURE — C9113 INJ PANTOPRAZOLE SODIUM, VIA: HCPCS | Performed by: INTERNAL MEDICINE

## 2024-05-01 PROCEDURE — 94799 UNLISTED PULMONARY SVC/PX: CPT

## 2024-05-01 PROCEDURE — 80048 BASIC METABOLIC PNL TOTAL CA: CPT | Performed by: HOSPITALIST

## 2024-05-01 PROCEDURE — 94761 N-INVAS EAR/PLS OXIMETRY MLT: CPT

## 2024-05-01 PROCEDURE — 85007 BL SMEAR W/DIFF WBC COUNT: CPT | Performed by: HOSPITALIST

## 2024-05-01 PROCEDURE — 84145 PROCALCITONIN (PCT): CPT | Performed by: NURSE PRACTITIONER

## 2024-05-01 PROCEDURE — 25000242 PHARM REV CODE 250 ALT 637 W/ HCPCS: Performed by: HOSPITALIST

## 2024-05-01 PROCEDURE — 99900035 HC TECH TIME PER 15 MIN (STAT)

## 2024-05-01 PROCEDURE — 80053 COMPREHEN METABOLIC PANEL: CPT | Performed by: HOSPITALIST

## 2024-05-01 PROCEDURE — 93306 TTE W/DOPPLER COMPLETE: CPT

## 2024-05-01 PROCEDURE — 63600175 PHARM REV CODE 636 W HCPCS: Mod: JZ,JG | Performed by: INTERNAL MEDICINE

## 2024-05-01 PROCEDURE — 63600175 PHARM REV CODE 636 W HCPCS: Performed by: HOSPITALIST

## 2024-05-01 PROCEDURE — 25000003 PHARM REV CODE 250: Performed by: INTERNAL MEDICINE

## 2024-05-01 PROCEDURE — 93306 TTE W/DOPPLER COMPLETE: CPT | Mod: 26,,, | Performed by: INTERNAL MEDICINE

## 2024-05-01 PROCEDURE — 25000003 PHARM REV CODE 250

## 2024-05-01 PROCEDURE — 36415 COLL VENOUS BLD VENIPUNCTURE: CPT | Performed by: NURSE PRACTITIONER

## 2024-05-01 PROCEDURE — 36415 COLL VENOUS BLD VENIPUNCTURE: CPT | Performed by: HOSPITALIST

## 2024-05-01 PROCEDURE — 63600175 PHARM REV CODE 636 W HCPCS: Performed by: INTERNAL MEDICINE

## 2024-05-01 PROCEDURE — 63600175 PHARM REV CODE 636 W HCPCS: Performed by: STUDENT IN AN ORGANIZED HEALTH CARE EDUCATION/TRAINING PROGRAM

## 2024-05-01 PROCEDURE — 86901 BLOOD TYPING SEROLOGIC RH(D): CPT | Performed by: HOSPITALIST

## 2024-05-01 PROCEDURE — 83735 ASSAY OF MAGNESIUM: CPT | Performed by: HOSPITALIST

## 2024-05-01 RX ORDER — NAPROXEN SODIUM 220 MG/1
81 TABLET, FILM COATED ORAL DAILY
Status: DISCONTINUED | OUTPATIENT
Start: 2024-05-02 | End: 2024-05-08 | Stop reason: HOSPADM

## 2024-05-01 RX ORDER — LOPERAMIDE HCL 1MG/7.5ML
2 LIQUID (ML) ORAL 2 TIMES DAILY
Status: DISCONTINUED | OUTPATIENT
Start: 2024-05-01 | End: 2024-05-02

## 2024-05-01 RX ORDER — METOPROLOL TARTRATE 25 MG/1
25 TABLET, FILM COATED ORAL 2 TIMES DAILY
Status: DISCONTINUED | OUTPATIENT
Start: 2024-05-01 | End: 2024-05-08 | Stop reason: HOSPADM

## 2024-05-01 RX ORDER — ATORVASTATIN CALCIUM 40 MG/1
80 TABLET, FILM COATED ORAL NIGHTLY
Status: DISCONTINUED | OUTPATIENT
Start: 2024-05-01 | End: 2024-05-08 | Stop reason: HOSPADM

## 2024-05-01 RX ORDER — IPRATROPIUM BROMIDE AND ALBUTEROL SULFATE 2.5; .5 MG/3ML; MG/3ML
3 SOLUTION RESPIRATORY (INHALATION)
Status: DISCONTINUED | OUTPATIENT
Start: 2024-05-01 | End: 2024-05-08 | Stop reason: HOSPADM

## 2024-05-01 RX ORDER — LOPERAMIDE HCL 1MG/7.5ML
2 LIQUID (ML) ORAL 2 TIMES DAILY
Status: DISCONTINUED | OUTPATIENT
Start: 2024-05-01 | End: 2024-05-01

## 2024-05-01 RX ADMIN — APIXABAN 2.5 MG: 2.5 TABLET, FILM COATED ORAL at 09:05

## 2024-05-01 RX ADMIN — ATORVASTATIN CALCIUM 80 MG: 40 TABLET, FILM COATED ORAL at 09:05

## 2024-05-01 RX ADMIN — VANCOMYCIN HYDROCHLORIDE 125 MG: KIT at 01:05

## 2024-05-01 RX ADMIN — METOPROLOL TARTRATE 25 MG: 25 TABLET, FILM COATED ORAL at 09:05

## 2024-05-01 RX ADMIN — AMLODIPINE BESYLATE 10 MG: 5 TABLET ORAL at 09:05

## 2024-05-01 RX ADMIN — LEVETIRACETAM 500 MG: 5 INJECTION INTRAVENOUS at 09:05

## 2024-05-01 RX ADMIN — IPRATROPIUM BROMIDE AND ALBUTEROL SULFATE 3 ML: 2.5; .5 SOLUTION RESPIRATORY (INHALATION) at 09:05

## 2024-05-01 RX ADMIN — INSULIN ASPART 5 UNITS: 100 INJECTION, SOLUTION INTRAVENOUS; SUBCUTANEOUS at 10:05

## 2024-05-01 RX ADMIN — LOPERAMIDE HYDROCHLORIDE 2 MG: 1 SOLUTION ORAL at 09:05

## 2024-05-01 RX ADMIN — CEFTAZIDIME, AVIBACTAM 1.25 G: 2; .5 POWDER, FOR SOLUTION INTRAVENOUS at 12:05

## 2024-05-01 RX ADMIN — PANTOPRAZOLE SODIUM 40 MG: 40 INJECTION, POWDER, FOR SOLUTION INTRAVENOUS at 09:05

## 2024-05-01 RX ADMIN — INSULIN ASPART 6 UNITS: 100 INJECTION, SOLUTION INTRAVENOUS; SUBCUTANEOUS at 01:05

## 2024-05-01 RX ADMIN — VANCOMYCIN HYDROCHLORIDE 125 MG: KIT at 05:05

## 2024-05-01 RX ADMIN — Medication 2 TABLET: at 09:05

## 2024-05-01 RX ADMIN — CEFTAZIDIME, AVIBACTAM 1.25 G: 2; .5 POWDER, FOR SOLUTION INTRAVENOUS at 05:05

## 2024-05-01 RX ADMIN — CLONIDINE HYDROCHLORIDE 0.1 MG: 0.1 TABLET ORAL at 05:05

## 2024-05-01 RX ADMIN — CEFTAZIDIME, AVIBACTAM 1.25 G: 2; .5 POWDER, FOR SOLUTION INTRAVENOUS at 01:05

## 2024-05-01 RX ADMIN — VANCOMYCIN HYDROCHLORIDE 750 MG: 750 INJECTION, POWDER, LYOPHILIZED, FOR SOLUTION INTRAVENOUS at 05:05

## 2024-05-01 RX ADMIN — VANCOMYCIN HYDROCHLORIDE 125 MG: KIT at 03:05

## 2024-05-01 RX ADMIN — INSULIN ASPART 4 UNITS: 100 INJECTION, SOLUTION INTRAVENOUS; SUBCUTANEOUS at 09:05

## 2024-05-01 RX ADMIN — SCOPALAMINE 1 PATCH: 1 PATCH, EXTENDED RELEASE TRANSDERMAL at 12:05

## 2024-05-01 NOTE — ASSESSMENT & PLAN NOTE
Chronic.    Temp:  [97.9 °F (36.6 °C)-99.6 °F (37.6 °C)]   Pulse:  [73-88]   Resp:  [18]   BP: (144-185)/(71-90)   SpO2:  [96 %-100 %] .   Home meds for hypertension were reviewed and noted below.   Hypertension Medications               amLODIPine (NORVASC) 10 MG tablet 1 tablet (10 mg total) by Per G Tube route once daily.    furosemide (LASIX) 40 MG tablet 40 mg by Per G Tube route once daily.    losartan (COZAAR) 100 MG tablet 1 tablet (100 mg total) by Per G Tube route once daily.    metoprolol tartrate (LOPRESSOR) 25 MG tablet 25 mg by Per G Tube route 2 (two) times daily.    cloNIDine (CATAPRES) 0.1 MG tablet 1 tablet (0.1 mg total) by Per G Tube route 3 (three) times daily.          Will order clonidine 0.1 q8h prn only in case pt went into rebound HTN.5/1: pt consistently hypertensive added home metoprolol

## 2024-05-01 NOTE — ASSESSMENT & PLAN NOTE
Blood cultures collected 4/25 (+) Staphylococcus haemolyticus  Repeat blood cultures collected 4/29 negative so far   Continue IV vancomycin

## 2024-05-01 NOTE — PROGRESS NOTES
[facility]  Progress Note    Patient Name: Steff Johnson  MRN: [id number]  Patient Class: IP- Inpatient   Admission Date: 4/25/2024  Length of Stay: 5 days  Attending Physician: Amber Fernández MD  Primary Care Provider: Cristina Ren MD        Subjective:     Principal Problem:Severe sepsis        HPI:  63 yo female with PMH of CVA with known baseline confusion and aphasia who was sent to the ER because of black drainage coming from around the PEG tube site and having black vomit.     On presentation to the ER, vitals showed 101/59, HR of 107, RR of 25 and fever of 102.3, spo2 of 95%. Cbc with wbc of 28 and anemia of 8.8. CMP showed acute renal failure with creatinine of of 2.5, anion gap of 19, hyponatermia of 133, potassium of 5.9. lactic acid of 6.1. Pt started to have projectile hematemesis. NGT was placed. Mathur was replaced after which pt developed hematuria. UA was grossly positive. C.diffi antigen is positive. EKG showed sinus tachycardia. CT C/A/P showed scattered nonspecific bronchiolitis in both lungs, with right lower lobes opacities potentially reflecting chronic bronchopneumonia, given similar appearance to CT of 12/11/2023. Pt was given the hyperkalemia procotol, 1.6 L of fluids, vanco/zosyn, and protonix 80 mg iv. She will be admitted to medicine for further management.     Overview/Hospital Course:      Interval History:  Up in bed, awake and alert.  Makes eye contact when engaged.  Nonverbal.  Does not follow simple commands.  No overnight events.  Started Avycaz 4/29.  T-max 100.9° No acute issues currently.    Review of Systems   Unable to perform ROS: Patient nonverbal     Objective:     Vital Signs (Most Recent):  Temp: 98.7 °F (37.1 °C) (04/30/24 1125)  Pulse: 77 (04/30/24 1125)  Resp: 18 (04/30/24 1125)  BP: (!) 153/70 (04/30/24 1125)  SpO2: 98 % (04/30/24 1125) Vital Signs (24h Range):  Temp:  [97.6 °F (36.4 °C)-100 °F (37.8 °C)] 98.7 °F (37.1 °C)  Pulse:   [77-98] 77  Resp:  [18-19] 18  SpO2:  [94 %-100 %] 98 %  BP: (148-168)/(70-91) 153/70     Weight: 53.5 kg (118 lb)  Body mass index is 23.83 kg/m².    Intake/Output Summary (Last 24 hours) at 4/30/2024 1437  Last data filed at 4/30/2024 0649  Gross per 24 hour   Intake 700 ml   Output 1350 ml   Net -650 ml         Physical Exam  Constitutional:       General: She is not in acute distress.  HENT:      Head: Normocephalic and atraumatic.   Cardiovascular:      Rate and Rhythm: Normal rate and regular rhythm.   Pulmonary:      Effort: Pulmonary effort is normal.      Breath sounds: Examination of the right-lower field reveals decreased breath sounds. Examination of the left-lower field reveals decreased breath sounds. Decreased breath sounds present.   Genitourinary:     Comments: Mathur catheter intact with dark conner urine.  Fecal management system and use with liquid brown stool.  Musculoskeletal:      Cervical back: Normal range of motion and neck supple.   Neurological:      Mental Status: She is alert.      Comments: Nonverbal, makes good eye contact   Psychiatric:         Mood and Affect: Affect is flat.             Significant Labs: All pertinent labs within the past 24 hours have been reviewed.  CBC:   Recent Labs   Lab 04/29/24  0628 04/30/24  0649   WBC 26.35* 23.17*   HGB 8.6* 9.2*   HCT 26.0* 28.6*   * 501*     CMP:   Recent Labs   Lab 04/29/24  0628 04/30/24  0649    140   K 4.6 4.7    109   CO2 26 24   * 247*   BUN 39* 35*   CREATININE 1.0 0.9   CALCIUM 8.8 8.8   PROT 6.8 7.0   ALBUMIN 2.7* 2.7*   BILITOT 0.4 0.3   ALKPHOS 147* 165*   AST 51* 47*   ALT 60* 63*   ANIONGAP 6* 7*     Magnesium:   Recent Labs   Lab 04/28/24  1758 04/29/24  0628 04/30/24  0649   MG 1.7 1.6 1.8     Component Ref Range & Units 04/30/24 0649   Procalcitonin 0.000 - 0.500 ng/mL 8.983     Urine Studies:   Recent Labs   Lab 04/29/24  1058   COLORU Yellow   APPEARANCEUA Hazy*   PHUR >8.0*   SPECGRAV 1.015    PROTEINUA 1+*   GLUCUA Negative   KETONESU Negative   BILIRUBINUA Negative   OCCULTUA 1+*   NITRITE Negative   UROBILINOGEN Negative   LEUKOCYTESUR 3+*   RBCUA 16*   WBCUA >100*   BACTERIA Occasional   HYALINECASTS 0      Microbiology Results (last 7 days)       Procedure Component Value Units Date/Time    MRSA Screen by PCR [[id number]]     Order Status: No result Specimen: Nasal Swab     Blood culture [[id number]]     Order Status: Sent Specimen: Blood     Blood culture [[id number]]     Order Status: Sent Specimen: Blood     Blood culture [[id number]] Collected: 04/29/24 0628    Order Status: Completed Specimen: Blood from Peripheral, Hand, Right Updated: 04/30/24 0832     Blood Culture, Routine No Growth to date      No Growth to date    Urine culture [[id number]] Collected: 04/29/24 1058    Order Status: Completed Specimen: Urine Updated: 04/30/24 0653     Urine Culture, Routine No growth to date    Narrative:      Specimen Source->Urine    Blood culture x two cultures. Draw prior to antibiotics. [[id number]]  (Abnormal) Collected: 04/25/24 1223    Order Status: Completed Specimen: Blood Updated: 04/29/24 1124     Blood Culture, Routine Gram stain aer bottle: Gram positive cocci      Results called to and read back by:Octavio Rodriguez RN-ED;  04/26/2024      07:56 CJD      STAPHYLOCOCCUS HAEMOLYTICUS  For susceptibility see order #[id number]      Narrative:      Aerobic and anaerobic    Blood culture x two cultures. Draw prior to antibiotics. [[id number]]  (Abnormal)  (Susceptibility) Collected: 04/25/24 1247    Order Status: Completed Specimen: Blood Updated: 04/29/24 1124     Blood Culture, Routine Gram stain aer bottle: Gram positive cocci      Results called to and read back by:Octavio Rodriguez RN-ED;  04/26/2024      07:35 CJD      STAPHYLOCOCCUS HAEMOLYTICUS    Narrative:      Aerobic and anaerobic    Urine culture [[id number]]  (Abnormal)  (Susceptibility) Collected: 04/25/24 1434    Order Status:  Completed Specimen: Urine Updated: 04/29/24 1051     Urine Culture, Routine PROVIDENCIA STUARTII  > 100,000 cfu/ml        KLEBSIELLA PNEUMONIAE   > 100,000 cfu/ml  MDRO  Results called to and read back by ZONIA Villasenor;    04/28/2024  12:16 CJD       Comment: Previous value was 2015 verified by I/AUT at 06:27 on 04/28/2024       Narrative:      Specimen Source->Urine    Stool culture **cannot be ordered stat** [[id number]] Collected: 04/25/24 1526    Order Status: Completed Specimen: Stool Updated: 04/27/24 1341     Stool Culture No Salmonella,Shigella,Vibrio,Campylobacter.      No E coli 0157:H7 isolated.    Rapid Organism ID by PCR (from Blood culture) [[id number]]  (Abnormal) Collected: 04/25/24 1247    Order Status: Completed Updated: 04/26/24 0826     Enterococcus faecalis Not Detected     Enterococcus faecium Not Detected     Listeria monocytogenes Not Detected     Staphylococcus spp. Detected     Staphylococcus aureus Not Detected     Staphylococcus epidermidis Not Detected     Staphylococcus lugdunensis Not Detected     Streptococcus species Not Detected     Streptococcus agalactiae Not Detected     Streptococcus pneumoniae Not Detected     Streptococcus pyogenes Not Detected     Acinetobacter calcoaceticus/baumannii complex Not Detected     Bacteroides fragilis Not Detected     Enterobacterales Not Detected     Enterobacter cloacae complex Not Detected     Escherichia coli Not Detected     Klebsiella aerogenes Not Detected     Klebsiella oxytoca Not Detected     Klebsiella pneumoniae group Not Detected     Proteus Not Detected     Salmonella sp Not Detected     Serratia marcescens Not Detected     Haemophilus influenzae Not Detected     Neisseria meningtidis Not Detected     Pseudomonas aeruginosa Not Detected     Stenotrophomonas maltophilia Not Detected     Candida albicans Not Detected     Candida auris Not Detected     Candida glabrata Not Detected     Candida krusei Not Detected      "Candida parapsilosis Not Detected     Candida tropicalis Not Detected     Cryptococcus neoformans/gattii Not Detected     CTX-M (ESBL ) Test not applicable     IMP (Carbapenem resistant) Test not applicable     KPC resistance gene (Carbapenem resistant) Test not applicable     mcr-1  Test not applicable     mec A/C  Test not applicable     mec A/C and MREJ (MRSA) gene Test not applicable     NDM (Carbapenem resistant) Test not applicable     OXA-48-like (Carbapenem resistant) Test not applicable     van A/B (VRE gene) Test not applicable     VIM (Carbapenem resistant) Test not applicable    Narrative:      Aerobic and anaerobic    C Diff Toxin by PCR [[id number]] Collected: 04/25/24 1526    Order Status: Completed Updated: 04/25/24 2104     C. diff PCR Negative    Clostridium difficile EIA [[id number]]  (Abnormal) Collected: 04/25/24 1526    Order Status: Completed Specimen: Stool Updated: 04/25/24 1624     C. diff Antigen Positive     C difficile Toxins A+B, EIA Negative     Comment: Testing not recommended for children <24 months old.                 Significant Imaging:  Not    Assessment/Plan:      * Severe sepsis  This patient does have evidence of infective focus  My overall impression is  severe sepsis :  Evidenced by leukocytosis, fever, current infection, and lactic acidosis.  Source: Urinary Tract  Antibiotics given-   Antibiotics (72h ago, onward)      Start     Stop Route Frequency Ordered    05/01/24 1800  vancomycin 750 mg in dextrose 5 % 250 mL IVPB (ready to mix)        Note to Pharmacy: Ht: 4' 11" (1.499 m)  Wt: 53.5 kg (118 lb)  Estimated Creatinine Clearance: 47.4 mL/min (based on SCr of 0.9 mg/dL).  Body mass index is 23.83 kg/m².    -- IV Every 24 hours (non-standard times) 04/30/24 1618    04/30/24 1800  vancomycin 125 mg/5 mL oral solution 125 mg  (C. difficile Infection (CDI) Treatment Order Panel)         -- PER G TUBE Every 6 hours 04/30/24 1533    04/30/24 1653  vancomycin - " "pharmacy to dose  (vancomycin IVPB (PEDS and ADULTS))        Placed in "And" Linked Group    -- IV pharmacy to manage frequency 04/30/24 1554    04/29/24 0800  cefTAZidime-avibactam (AVYCAZ) 1.25 g in dextrose 5 % (D5W) 100 mL         -- IV Every 8 hours (non-standard times) 04/29/24 0046    04/25/24 2100  mupirocin 2 % ointment         04/30/24 2059 Nasl 2 times daily 04/25/24 1905          Latest lactate reviewed-  No results for input(s): "LACTATE", "POCLAC" in the last 72 hours.  Organ dysfunction indicated by Acute kidney injury-- which has resolved    Fluid challenge Not needed - patient is not hypotensive      Post- resuscitation assessment No - Post resuscitation assessment not needed       Will Not start Pressors- Levophed for MAP of 65  Source control achieved by:  IV antibiotics  ---------------------------------------------------------------  It is noted that patient has history of recurrent episodes of sepsis due to complicated UTI.  Awaiting urine culture results  Procalcitonin 8.983.  Not sure if this level is improved since an initial level was not obtained when patient was admitted.  Highest Procalcitonin was 133 with patient's last episode of sepsis 12/2/23.  Lactic acidosis resolved  Urine culture positive Klebsiella (/MDRO) and Providencia stuartii/ repeat urine culture negative so far  Blood cultures collected 4/25 (+) Staphylococcus haemolyticus/ repeat blood cultures collected 4/29 negative so far  Stool negative for acute C diff infection  Continue IV vancomycin and ceftazidime-avibactam   Continue IV fluid hydration  Fevers are slowly improving. Had temp 202.2 4/28 @ 2245.  First dose of ceftazidime-avibactam given 4/29 @ 0035.  T-max since starting new antibiotic was 100.9 @ 4/29 0420.  Last antipyretic given 4/29 @2130.  Give more time to work.    Complicated UTI (urinary tract infection)  History of chronic Mathur catheter  Catheter changed on 04/25/24   Urine culture collected 4/23 (+) " "Klebsiella pneumoniae (/MDRO) and Providencia stuartii  Urine culture collected 4/29 negative so far  Repeat urine culture collected today and is pending  Continue ceftazidime-avibactam (Started 4/29)  UA collected 4/29 improved when compared to UA collected 4/25: Bact- few (4/29),Many (4/25)/ Color- yellow (was red 4/25)/ Blood- 1+ (was 3+)/ still with pyuria and 3+ Leuk Est/ Remains Nitrite neg  Awaiting urine culture results    Bacteremia  Blood cultures collected 4/25 (+) Staphylococcus haemolyticus  Repeat blood cultures collected 4/29 negative so far   Continue IV vancomycin      Clostridium difficile infection  Stool positive C diff antigen, negative toxins, Negative C. Diff PCR---> not acute C. Diff infection  ID following-- continue oral vancomycin for now  Diarrhea possibly from tube feedings  Start lactobacillus 2 tabs bid    Quadriplegia  Chronic/due to history of CVA  Continue pressure relief measures      History of ischemic stroke  Continue prevention: statin, ASA, Eliquis     Acute kidney injury  Essentially resolved/creatinine normal.    Patient with acute kidney injury/acute renal failure likely due to pre-renal azotemia due to IVVD 2/2 GIB and Sepsis. COURTNEY is currently stable. Baseline creatinine  0.7  - Labs reviewed- Renal function/electrolytes with Estimated Creatinine Clearance: 47.4 mL/min (based on SCr of 0.9 mg/dL). according to latest data. Monitor urine output and serial BMP and adjust therapy as needed. Avoid nephrotoxins and renally dose meds for GFR listed above.      Type 2 diabetes mellitus with neurologic complication, with long-term current use of insulin  Patient's FSGs are uncontrolled due to hyperglycemia on current medication regimen.  Last A1c reviewed-   Lab Results   Component Value Date    HGBA1C 7.8 (H) 12/05/2023     Most recent fingerstick glucose reviewed- No results for input(s): "POCTGLUCOSE" in the last 24 hours.  Current correctional scale  Low  Increase " anti-hyperglycemic dose as follows-   Antihyperglycemics (From admission, onward)      Start     Stop Route Frequency Ordered    04/26/24 0000  insulin aspart U-100 pen 0-5 Units         -- SubQ Every 4 hours 04/25/24 2346    04/26/24 0000  insulin detemir U-100 (Levemir) pen 15 Units         -- SubQ Nightly 04/25/24 2347          Hold Oral hypoglycemics while patient is in the hospital.  -----------------------------------------------  Increase basal insulin to 18 units nightly and Increase SSI to medium dose      Hypertension  Chronic.    Temp:  [102.3 °F (39.1 °C)]   Pulse:  []   Resp:  [25-38]   BP: (101-121)/(59-72)   SpO2:  [95 %-98 %] .   Home meds for hypertension were reviewed and noted below.   Hypertension Medications               amLODIPine (NORVASC) 10 MG tablet 1 tablet (10 mg total) by Per G Tube route once daily.    furosemide (LASIX) 40 MG tablet 40 mg by Per G Tube route once daily.    losartan (COZAAR) 100 MG tablet 1 tablet (100 mg total) by Per G Tube route once daily.    metoprolol tartrate (LOPRESSOR) 25 MG tablet 25 mg by Per G Tube route 2 (two) times daily.    cloNIDine (CATAPRES) 0.1 MG tablet 1 tablet (0.1 mg total) by Per G Tube route 3 (three) times daily.          Will order clonidine 0.1 q8h prn only in case pt went into rebound HTN. Pt is currently severely sepsis, has risk of hypotension and decompensation.     Acute blood loss anemia  Patient's anemia is currently controlled. Has received 2 units of PRBCs on 4/24 . Etiology likely d/t acute blood loss which was from GIB  Current CBC reviewed-   Lab Results   Component Value Date    HGB 9.2 (L) 04/30/2024    HCT 28.6 (L) 04/30/2024     Monitor serial CBC and transfuse if patient becomes hemodynamically unstable, symptomatic or H/H drops below 7/21.    Hyperkalemia  Patient has hypokalemia which is Acute and currently controlled. Most recent potassium levels reviewed-   Lab Results   Component Value Date    K 4.7 04/30/2024    . Will continue potassium replacement per protocol and recheck repeat levels after replacement completed.   -----------------------------------------------------------------------  Potassium currently normal        GI hemorrhage  GI consulted: S/P EGD: moderate/severe distal esophagitis, no bleeding.  Aspirin Eliquis has been resumed  Continue IV Protonix b.i.d.    Seizure disorder  Controlled.  Continue chronic antiepileptic.      Sacral decubitus ulcer, stage III  Chronic issue  Wound Care following  Continue current wound care regimen   Continue pressure relief measures      Aphasia  Due to history of CVA        VTE Risk Mitigation (From admission, onward)           Ordered     IP VTE HIGH RISK PATIENT  Once         04/25/24 1903     Place sequential compression device  Until discontinued         04/25/24 1903                    Discharge Planning   ALEX: 5/1/2024     Code Status: Full Code   Is the patient medically ready for discharge?:     Reason for patient still in hospital (select all that apply): Patient trending condition and Treatment  Discharge Plan A: Return to nursing home                  Yolie Pak NP  Department of Hospital Medicine   [facility]

## 2024-05-01 NOTE — ASSESSMENT & PLAN NOTE
"This patient does have evidence of infective focus  My overall impression is  severe sepsis :  Evidenced by leukocytosis, fever, current infection, and lactic acidosis.  Source: Urinary Tract  Antibiotics given-   Antibiotics (72h ago, onward)      Start     Stop Route Frequency Ordered    05/01/24 1800  vancomycin 750 mg in dextrose 5 % 250 mL IVPB (ready to mix)        Note to Pharmacy: Ht: 4' 11" (1.499 m)  Wt: 53.5 kg (118 lb)  Estimated Creatinine Clearance: 47.4 mL/min (based on SCr of 0.9 mg/dL).  Body mass index is 23.83 kg/m².    -- IV Every 24 hours (non-standard times) 04/30/24 1618    04/30/24 1800  vancomycin 125 mg/5 mL oral solution 125 mg  (C. difficile Infection (CDI) Treatment Order Panel)         -- PER G TUBE Every 6 hours 04/30/24 1533    04/30/24 1653  vancomycin - pharmacy to dose  (vancomycin IVPB (PEDS and ADULTS))        Placed in "And" Linked Group    -- IV pharmacy to manage frequency 04/30/24 1554    04/29/24 0800  cefTAZidime-avibactam (AVYCAZ) 1.25 g in dextrose 5 % (D5W) 100 mL         -- IV Every 8 hours (non-standard times) 04/29/24 0046    04/25/24 2100  mupirocin 2 % ointment         04/30/24 2059 Nasl 2 times daily 04/25/24 1905          Latest lactate reviewed-  No results for input(s): "LACTATE", "POCLAC" in the last 72 hours.  Organ dysfunction indicated by Acute kidney injury-- which has resolved    Fluid challenge Not needed - patient is not hypotensive      Post- resuscitation assessment No - Post resuscitation assessment not needed       Will Not start Pressors- Levophed for MAP of 65  Source control achieved by:  IV antibiotics  ---------------------------------------------------------------  It is noted that patient has history of recurrent episodes of sepsis due to complicated UTI.  Awaiting urine culture results  Procalcitonin 8.983.  Not sure if this level is improved since an initial level was not obtained when patient was admitted.  Highest Procalcitonin was 133 " with patient's last episode of sepsis 12/2/23.  Lactic acidosis resolved  Urine culture positive Klebsiella (/MDRO) and Providencia stuartii/ repeat urine culture negative so far  Blood cultures collected 4/25 (+) Staphylococcus haemolyticus/ repeat blood cultures collected 4/29 negative so far  Stool negative for acute C diff infection  Continue IV vancomycin and ceftazidime-avibactam   Continue IV fluid hydration  Fevers are slowly improving. Had temp 202.2 4/28 @ 2245.  First dose of ceftazidime-avibactam given 4/29 @ 0035.  T-max since starting new antibiotic was 100.9 @ 4/29 0420.  Last antipyretic given 4/29 @2130.  Give more time to work.

## 2024-05-01 NOTE — ASSESSMENT & PLAN NOTE
Patient's anemia is currently controlled. Has received 2 units of PRBCs on 4/24 . Etiology likely d/t acute blood loss which was from GIB  Current CBC reviewed-   Lab Results   Component Value Date    HGB 8.5 (L) 05/01/2024    HCT 26.6 (L) 05/01/2024     Monitor serial CBC and transfuse if patient becomes hemodynamically unstable, symptomatic or H/H drops below 7/21.

## 2024-05-01 NOTE — ASSESSMENT & PLAN NOTE
Patient has hypokalemia which is Acute and currently controlled. Most recent potassium levels reviewed-   Lab Results   Component Value Date    K 4.3 05/01/2024   . Will continue potassium replacement per protocol and recheck repeat levels after replacement completed.   -----------------------------------------------------------------------  Potassium currently normal

## 2024-05-01 NOTE — ASSESSMENT & PLAN NOTE
History of chronic Mathur catheter  Catheter changed on 04/25/24   Urine culture collected 4/23 (+) Klebsiella pneumoniae (/MDRO) and Providencia stuartii  Urine culture collected 4/29 negative so far  Continue ceftazidime-avibactam (Started 4/29)  UA collected 4/29 improved when compared to UA collected 4/25: Bact- few (4/29),Many (4/25)/ Color- yellow (was red 4/25)/ Blood- 1+ (was 3+)/ still with pyuria and 3+ Leuk Est/ Remains Nitrite neg  Awaiting urine culture results

## 2024-05-01 NOTE — ASSESSMENT & PLAN NOTE
Essentially resolved/creatinine normal.    Patient with acute kidney injury/acute renal failure likely due to pre-renal azotemia due to IVVD 2/2 GIB and Sepsis. COURTNEY is currently stable. Baseline creatinine  0.7  - Labs reviewed- Renal function/electrolytes with Estimated Creatinine Clearance: 60.9 mL/min (based on SCr of 0.7 mg/dL). according to latest data. Monitor urine output and serial BMP and adjust therapy as needed. Avoid nephrotoxins and renally dose meds for GFR listed above.

## 2024-05-01 NOTE — PROGRESS NOTES
[facility]   Department of Infectious Disease  Progress  Note        PATIENT NAME: Steff Johnson  MRN: [id number]  TODAY'S DATE: 05/01/2024  ADMIT DATE: 4/25/2024  LOS: 6 days    CHIEF COMPLAINT: GI Problem (Brown fluid being withdrawn from peg tube, vomiting brown fluid. Smells of feces. Since this morning. Pt is nonverbal)    PRINCIPLE PROBLEM: Severe sepsis    REASON FOR CONSULT: UA and c.diffi     INTERVAL HISTORY     5/1/2024@1228 (Danette):  Patient actually looks a little better today.  When you ask her how she was doing she says all right.  She denies pain.  She still has a rectal tube stool is not as copious and liquid as it was yesterday.  T-max 99.6° in last 24 hour.  KUB from yesterday with unremarkable bowel gas pattern and patchy hazy opacities in the right lung base possibly atelectasis or infiltrate.  WBC 21.74 trending down, platelets 541 trending up again.  Left shift 77%, bands improved at 1%.  CRP 14.40 trending down, procalcitonin 5.069 trending down.  4/30 blood cultures x2 sets no growth to date.  RVP negative.  MRSA screen negative.  4/29 blood and urine cultures negative.  Echocardiogram with no abnormalities.    4/30/24@1315 (Danette):  Patient seen alone in her room today.  She opens her eyes but does not pay attention.  She does not appear in any distress in his on room air.  She has a rectal tube with a copious amount of liquid stool.  Last chest x-ray from 04/27 with small right pleural effusion and right basilar airspace disease that appears worse than previous with subsegmental atelectasis of the left lung base.  Last fever on 04/28 was 102.2, T-max in the last 24 hours 100, WBC 23.17 still elevated, platelets 501 still elevated, left shift 77% with 4% bands.  Anemia better after blood transfusion at 9.2/28.6.  BUN/CR 35/0.9, creatinine clearance 47.4, alk-phos elevated 165, albumin 2.7, ALT/AST 63/47.  Procalcitonin 8.983 and CRP greater than 16.  Urine culture from  "04/25 with Providencia stuartii and Klebsiella pneumoniae /MDRO; 4/25 blood cultures x2 sets with 1 bottle from each set with aerobic bottle positive for staph haemolyticus (vanc ana 2).    04/27/2024.  Dr. Calvo covering for the weekend.  Patient is nonverbal as usual.  I feel like she understands me when I talk to her.  Talking gently and slowly puts her at ease.  She is afebrile at this time.  T-max yesterday was 101.8.  Much better than T-max on admission of 102.3  Blood cultures are showing Staphylococcus species.  Will order repeat blood cultures   Urine cultures are showing Providencia and a LPGNR in progress.    Stool cultures no growth to date.    Antibiotics (From admission, onward)      Start     Stop Route Frequency Ordered    05/01/24 1800  vancomycin 750 mg in dextrose 5 % 250 mL IVPB (ready to mix)        Note to Pharmacy: Ht: 4' 11" (1.499 m)  Wt: 53.5 kg (118 lb)  Estimated Creatinine Clearance: 47.4 mL/min (based on SCr of 0.9 mg/dL).  Body mass index is 23.83 kg/m².    -- IV Every 24 hours (non-standard times) 04/30/24 1618    04/30/24 1800  vancomycin 125 mg/5 mL oral solution 125 mg  (C. difficile Infection (CDI) Treatment Order Panel)         -- PER G TUBE Every 6 hours 04/30/24 1533    04/30/24 1653  vancomycin - pharmacy to dose  (vancomycin IVPB (PEDS and ADULTS))        Placed in "And" Linked Group    -- IV pharmacy to manage frequency 04/30/24 1554    04/29/24 0800  cefTAZidime-avibactam (AVYCAZ) 1.25 g in dextrose 5 % (D5W) 100 mL         -- IV Every 8 hours (non-standard times) 04/29/24 0046    04/25/24 2100  mupirocin 2 % ointment         04/30/24 2059 Nasl 2 times daily 04/25/24 1905          Antifungals (From admission, onward)      None             ASSESSMENT and PLAN     1. Staphylococcus haemolyticus bacteremia   -in December 2023 patient had Enterococcus faecalis bacteremia, Proteus mirabilis ESBL bacteremia both from urinary source  -4/25 blood cultures x2 sets with aerobic " bottle from each set positive for staph haemolyticus - sensitive to Bactrim and vancomycin (LEAH 2)  -4/29 blood culture x1 set no growth to date, pending   -procalcitonin 8.983, CRP>16   -4/30 leukocytosis, initially 24.95, peaked at 28.44 on 04/28, 23.17 on 04/30, left shift, 4% band    2. Catheter associated UTI.    -previously grew ESBL Proteus in December 2023  -Catheter changed on 04/25/24.  -4/25 urine culture with Providencia stuartii and Klebsiella pneumoniae /MDRO   -4/29 urine culture no growth to date    3. Positive C diff antigen with negative PCR and toxin assay  -diarrhea watery, stool cultures negative, C diff antigen positive, toxin and PCR negative  -GI PCR ordered    4. Acute kidney injury  -initial BUN/CR 84/2.7; followed by Nephrology  -4/30 BUN/CR 35/0.9 - current creatinine clearance 47.4  -5/1 creatinine has improved and is at baseline 0.7, creatinine clearance 60.9    5. Acute on chronic anemia  -given given 2 units PRBCs, managed by hospitalist    6. Pneumonia versus pulmonary edema  -initial CT chest abdomen pelvis with scattered nonspecific bronchiolitis and right lower lobe opacities with chronic bronchopneumonia similar to previous from December 2023  -chest x-rays with minimal improvement  -procalcitonin elevated 8.983-->14.40    7. Chronic medical problems including CVA, aphasia, dysphagia with PEG tube, chronic indwelling Mathur catheter, seizures        RECOMMENDATIONS:    Continue oral vancomycin 125 mg via PEG tube but increase to every 6 hours  Continue ceftazidime-avibactam 1.25 g every 8 hours over to our infusions  Continue vancomycin with pharmacy to dose after blood cultures x2 drawn today  Monitor renal function  Add Imodium twice daily   Obtain gastrointestinal PCR      D/W Dr Cochran    Please send alaTest secure chat with any questions.      Antibiotics (From admission, onward)      Start     Stop Route Frequency Ordered    05/01/24 1800  vancomycin 750 mg in dextrose 5 %  "250 mL IVPB (ready to mix)        Note to Pharmacy: Ht: 4' 11" (1.499 m)  Wt: 53.5 kg (118 lb)  Estimated Creatinine Clearance: 47.4 mL/min (based on SCr of 0.9 mg/dL).  Body mass index is 23.83 kg/m².    -- IV Every 24 hours (non-standard times) 04/30/24 1618    04/30/24 1800  vancomycin 125 mg/5 mL oral solution 125 mg  (C. difficile Infection (CDI) Treatment Order Panel)         -- PER G TUBE Every 6 hours 04/30/24 1533    04/30/24 1653  vancomycin - pharmacy to dose  (vancomycin IVPB (PEDS and ADULTS))        Placed in "And" Linked Group    -- IV pharmacy to manage frequency 04/30/24 1554    04/29/24 0800  cefTAZidime-avibactam (AVYCAZ) 1.25 g in dextrose 5 % (D5W) 100 mL         -- IV Every 8 hours (non-standard times) 04/29/24 0046    04/25/24 2100  mupirocin 2 % ointment         04/30/24 2059 Nasl 2 times daily 04/25/24 1905          Antifungals (From admission, onward)      None           Antivirals (From admission, onward)      None              Review of patient's allergies indicates:  No Known Allergies        SUBJECTIVE     Review of systems: Unable to obtain      OBJECTIVE   Temp:  [97.9 °F (36.6 °C)-99.6 °F (37.6 °C)] 98 °F (36.7 °C)  Pulse:  [73-88] 74  Resp:  [18] 18  SpO2:  [96 %-100 %] 97 %  BP: (144-185)/(71-90) 170/81  Temp:  [97.9 °F (36.6 °C)-99.6 °F (37.6 °C)]   Temp: 98 °F (36.7 °C) (05/01/24 1107)  Pulse: 74 (05/01/24 1107)  Resp: 18 (05/01/24 1107)  BP: (!) 170/81 (05/01/24 1107)  SpO2: 97 % (05/01/24 1107)    Intake/Output Summary (Last 24 hours) at 5/1/2024 1433  Last data filed at 5/1/2024 0630  Gross per 24 hour   Intake 1420 ml   Output 1850 ml   Net -430 ml     Physical Exam  General:  Awake and alert, says a few words, makes eye contact but then stops responding.  Eyes: Eyes with no icterus or injection.  No exudates.  Ears:  Hearing grossly intact  Mouth:  Dry mucous membranes.  Cardiovascular: Regular rate and rhythm, no murmurs, no significant edema  Respiratory:  Clear " anteriorly, on RA, no tachypnea or increased work of breathing.   Gastrointestinal:  Soft and obese with active bowel sounds, no distention.  Peg tube with no redness or drainage at PEG site.  Rectal tube with liquid greenish diarrhea.  Genitourinary:  Urinary catheter with clear yellow urine  Musculoskeletal:  No spontaneous movement, heel protectors in place.   Skin: See wound photos today. Pale, warm and dry.   Neuro: poorly attentive, does not follow commands, nonverbal  Psych:  No agitation,   VAD:  PIV  ISOLATION:  Contact isolation    Wounds:   4/30                        Significant Labs: All pertinent labs within the past 24 hours have been reviewed.    CBC LAST 7  Recent Labs   Lab 04/25/24  1701 04/25/24  1854 04/26/24  0405 04/26/24  0608 04/27/24  0014 04/27/24  0615 04/27/24  1121 04/28/24  0455 04/29/24  0628 04/30/24  0649 05/01/24  0545   WBC 24.95*  --  24.03*  --   --  19.61*  --  28.44* 26.35* 23.17* 21.74*   RBC 2.76*  --  3.38*  --   --  3.18*  --  3.74* 3.13* 3.41* 3.11*   HGB 7.3*   < > 9.4*   < > 10.0* 8.8*  8.8* 9.8* 10.3* 8.6* 9.2* 8.5*   HCT 22.3*  --  27.9*  --   --  25.5*  --  30.6* 26.0* 28.6* 26.6*   MCV 81*  --  83  --   --  80*  --  82 83 84 86   MCH 26.4*  --  27.8  --   --  27.7  --  27.5 27.5 27.0 27.3   MCHC 32.7  --  33.7  --   --  34.5  --  33.7 33.1 32.2 32.0   RDW 18.7*  --  16.6*  --   --  16.9*  --  17.5* 18.2* 18.6* 18.6*   *  --  482*  --   --  476*  --  498* 498* 501* 541*   MPV 10.7  --  10.1  --   --  10.4  --  10.6 10.5 11.1 10.7   GRAN 83.0*  20.7*  --  86.8*  20.9*  --   --  88.6*  17.4*  --  86.6*  24.6* 80.7*  21.3* 77.0* 77.0*   LYMPH 9.3*  2.3  --  5.7*  1.4  --   --  4.4*  0.9*  --  5.0*  1.4 5.9*  1.6 6.0* 9.0*   MONO 5.8  1.5*  --  4.7  1.1*  --   --  4.6  0.9  --  5.7  1.6* 7.9  2.1* 6.0 9.0   BASO 0.05  --  0.09  --   --  0.05  --  0.13 0.10  --   --    NRBC 0  --  0  --   --  0  --  0 0 0 0    < > = values in this interval not  "displayed.       CHEMISTRY LAST 7  Recent Labs   Lab 04/25/24  1223 04/25/24  2146 04/26/24  0405 04/27/24  0615 04/28/24  0455 04/28/24  1333 04/28/24  1758 04/29/24  0628 04/30/24  0649 05/01/24  0546   * 135* 136 145 144  --   --  142 140 141   K 5.9* 4.1 4.3 3.2* 3.4* 4.6  --  4.6 4.7 4.3   CL 96 104 101 111* 109  --   --  110 109 109   CO2 18* 11* 20* 22* 25  --   --  26 24 24   ANIONGAP 19* 20* 15 12 10  --   --  6* 7* 8   BUN 93* 84* 96* 69* 56*  --   --  39* 35* 29*   CREATININE 2.5* 2.7* 3.1* 1.9* 1.4  --   --  1.0 0.9 0.7   * 302* 241* 67* 148*  --   --  209* 247* 242*   CALCIUM 9.7 7.4* 8.3* 8.6* 9.2  --   --  8.8 8.8 8.6*   MG 2.2  --  2.0 1.9 1.8  --  1.7 1.6 1.8 1.7   ALBUMIN 3.3*  --  2.7* 2.6* 2.9*  --   --  2.7* 2.7* 2.6*   PROT 8.0  --  6.6 6.5 7.4  --   --  6.8 7.0 6.7   ALKPHOS 89  --  68 84 152*  --   --  147* 165* 136*   ALT 15  --  10 16 86*  --   --  60* 63* 43   AST 18  --  19 37 102*  --   --  51* 47* 22   BILITOT 0.6  --  0.9 0.6 0.6  --   --  0.4 0.3 0.3       Estimated Creatinine Clearance: 60.9 mL/min (based on SCr of 0.7 mg/dL).    INFLAMMATORY/PROCAL  LAST 7  Recent Labs   Lab 04/29/24 0628 04/30/24  0649 05/01/24  0546   PROCAL  --  8.983* 5.069*   CRP >16.00*  --  14.40*     No results found for: "ESR"  CRP   Date Value Ref Range Status   05/01/2024 14.40 (H) <1.00 mg/dL Final     Comment:     CRP-Normal Application expected values:   <1.0        mg/dL   Normal Range  1.0 - 5.0  mg/dL   Indicates mild inflammation  5.0 - 10.0 mg/dL   Indicates severe inflammation  >10.0        mg/dL   Represents serious processes and   frequently         indicates the presence of a bacterial   infection.      04/29/2024 >16.00 (H) <1.00 mg/dL Final     Comment:     CRP-Normal Application expected values:   <1.0        mg/dL   Normal Range  1.0 - 5.0  mg/dL   Indicates mild inflammation  5.0 - 10.0 mg/dL   Indicates severe inflammation  >10.0        mg/dL   Represents serious processes " and   frequently         indicates the presence of a bacterial   infection.      12/11/2023 55.1 (H) 0.0 - 8.2 mg/L Final   12/09/2023 143.3 (H) 0.0 - 8.2 mg/L Final   12/06/2023 293.0 (H) 0.0 - 8.2 mg/L Final   10/25/2023 92.6 (H) 0.0 - 8.2 mg/L Final   06/15/2023 0.65 <0.76 mg/dL Final       PRIOR  MICROBIOLOGY:  Reviewed    Susceptibility data from last 90 days.  Collected Specimen Info Organism Amp/Sulbactam Ampicillin Cefepime Ceftriaxone Ciprofloxacin Clindamycin Ertapenem Erythromycin Gentamicin Levofloxacin Meropenem Nitrofurantoin Oxacillin Penicillin   04/25/24 Urine Providencia stuartii  I   S  S  R   S   R  R  S        KLEBSIELLA PNEUMONIAE   R  R  R  R  R   R   S  I  R  R     04/25/24 Blood Staphylococcus haemolyticus     R   R   R      R  R   04/25/24 Blood Staphylococcus haemolyticus                 02/23/24 Blood from Peripheral, Antecubital, Left No growth after 5 days.                 02/23/24 Blood from Peripheral, Antecubital, Left No growth after 5 days.                 02/23/24 Urine Candida tropicalis                   Collected Specimen Info Organism PIPERACILLIN/TAZO SUSCEPTIBILITY Tetracycline Tobramycin Trimeth/Sulfa Vancomycin   04/25/24 Urine Providencia stuartii  S   R  S      KLEBSIELLA PNEUMONIAE   R  R  I  R    04/25/24 Blood Staphylococcus haemolyticus   R   S  S   04/25/24 Blood Staphylococcus haemolyticus        02/23/24 Blood from Peripheral, Antecubital, Left No growth after 5 days.        02/23/24 Blood from Peripheral, Antecubital, Left No growth after 5 days.        02/23/24 Urine Candida tropicalis              LAST 7 DAYS MICROBIOLOGY   Microbiology Results (last 7 days)       Procedure Component Value Units Date/Time    Blood culture [[id number]] Collected: 04/29/24 0628    Order Status: Completed Specimen: Blood from Peripheral, Hand, Right Updated: 05/01/24 0832     Blood Culture, Routine No Growth to date      No Growth to date      No Growth to date    Urine  culture [[id number]] Collected: 04/29/24 1058    Order Status: Completed Specimen: Urine Updated: 05/01/24 0646     Urine Culture, Routine No growth to date    Narrative:      Specimen Source->Urine    Blood culture [[id number]] Collected: 04/30/24 1729    Order Status: Completed Specimen: Blood from Peripheral, Hand, Left Updated: 05/01/24 0117     Blood Culture, Routine No Growth to date    Blood culture [[id number]] Collected: 04/30/24 1729    Order Status: Completed Specimen: Blood from Peripheral, Hand, Right Updated: 05/01/24 0117     Blood Culture, Routine No Growth to date    MRSA Screen by PCR [[id number]] Collected: 04/30/24 1653    Order Status: Completed Specimen: Nasal Swab Updated: 04/30/24 1959     MRSA SCREEN BY PCR Negative    Respiratory Infection Panel (PCR), Nasopharyngeal [[id number]] Collected: 04/30/24 1728    Order Status: Completed Specimen: Nasopharyngeal Swab Updated: 04/30/24 1844     Respiratory Infection Panel Source NP swab     Adenovirus Not Detected     Coronavirus 229E, Common Cold Virus Not Detected     Coronavirus HKU1, Common Cold Virus Not Detected     Coronavirus NL63, Common Cold Virus Not Detected     Coronavirus OC43, Common Cold Virus Not Detected     Comment: Coronavirus strains 229E, HKU1, NL63, and OC43 can cause the common   cold   and are not associated with the respiratory disease outbreak caused   by  the COVID-19 (SARS-CoV-2 novel Coronavirus) strain.           SARS-CoV2 (COVID-19) Qualitative PCR Not Detected     Human Metapneumovirus Not Detected     Human Rhinovirus/Enterovirus Not Detected     Influenza A (subtypes H1, H1-2009,H3) Not Detected     Influenza B Not Detected     Parainfluenza Virus 1 Not Detected     Parainfluenza Virus 2 Not Detected     Parainfluenza Virus 3 Not Detected     Parainfluenza Virus 4 Not Detected     Respiratory Syncytial Virus Not Detected     Bordetella Parapertussis (YN4459) Not Detected     Bordetella pertussis (ptxP) Not  Detected     Chlamydia pneumoniae Not Detected     Mycoplasma pneumoniae Not Detected     Comment: Respiratory Infection Panel testing performed by Multiplex PCR.       Narrative:      Respiratory Infection Panel source->NP Swab    Blood culture x two cultures. Draw prior to antibiotics. [[id number]]  (Abnormal) Collected: 04/25/24 1223    Order Status: Completed Specimen: Blood Updated: 04/29/24 1124     Blood Culture, Routine Gram stain aer bottle: Gram positive cocci      Results called to and read back by:Octavio Rodriguez RN-ED;  04/26/2024      07:56 CJD      STAPHYLOCOCCUS HAEMOLYTICUS  For susceptibility see order #[id number]      Narrative:      Aerobic and anaerobic    Blood culture x two cultures. Draw prior to antibiotics. [[id number]]  (Abnormal)  (Susceptibility) Collected: 04/25/24 1247    Order Status: Completed Specimen: Blood Updated: 04/29/24 1124     Blood Culture, Routine Gram stain aer bottle: Gram positive cocci      Results called to and read back by:Octavio Rodriguez RN-ED;  04/26/2024      07:35 CJD      STAPHYLOCOCCUS HAEMOLYTICUS    Narrative:      Aerobic and anaerobic    Urine culture [[id number]]  (Abnormal)  (Susceptibility) Collected: 04/25/24 1434    Order Status: Completed Specimen: Urine Updated: 04/29/24 1051     Urine Culture, Routine PROVIDENCIA STUARTII  > 100,000 cfu/ml        KLEBSIELLA PNEUMONIAE   > 100,000 cfu/ml  MDRO  Results called to and read back by ZONIA Villasenor;    04/28/2024  12:16 CJD       Comment: Previous value was 2015 verified by I/AUT at 06:27 on 04/28/2024       Narrative:      Specimen Source->Urine    Stool culture **cannot be ordered stat** [[id number]] Collected: 04/25/24 1526    Order Status: Completed Specimen: Stool Updated: 04/27/24 1341     Stool Culture No Salmonella,Shigella,Vibrio,Campylobacter.      No E coli 0157:H7 isolated.    Rapid Organism ID by PCR (from Blood culture) [[id number]]  (Abnormal) Collected: 04/25/24 1247    Order  Status: Completed Updated: 04/26/24 0826     Enterococcus faecalis Not Detected     Enterococcus faecium Not Detected     Listeria monocytogenes Not Detected     Staphylococcus spp. Detected     Staphylococcus aureus Not Detected     Staphylococcus epidermidis Not Detected     Staphylococcus lugdunensis Not Detected     Streptococcus species Not Detected     Streptococcus agalactiae Not Detected     Streptococcus pneumoniae Not Detected     Streptococcus pyogenes Not Detected     Acinetobacter calcoaceticus/baumannii complex Not Detected     Bacteroides fragilis Not Detected     Enterobacterales Not Detected     Enterobacter cloacae complex Not Detected     Escherichia coli Not Detected     Klebsiella aerogenes Not Detected     Klebsiella oxytoca Not Detected     Klebsiella pneumoniae group Not Detected     Proteus Not Detected     Salmonella sp Not Detected     Serratia marcescens Not Detected     Haemophilus influenzae Not Detected     Neisseria meningtidis Not Detected     Pseudomonas aeruginosa Not Detected     Stenotrophomonas maltophilia Not Detected     Candida albicans Not Detected     Candida auris Not Detected     Candida glabrata Not Detected     Candida krusei Not Detected     Candida parapsilosis Not Detected     Candida tropicalis Not Detected     Cryptococcus neoformans/gattii Not Detected     CTX-M (ESBL ) Test not applicable     IMP (Carbapenem resistant) Test not applicable     KPC resistance gene (Carbapenem resistant) Test not applicable     mcr-1  Test not applicable     mec A/C  Test not applicable     mec A/C and MREJ (MRSA) gene Test not applicable     NDM (Carbapenem resistant) Test not applicable     OXA-48-like (Carbapenem resistant) Test not applicable     van A/B (VRE gene) Test not applicable     VIM (Carbapenem resistant) Test not applicable    Narrative:      Aerobic and anaerobic    C Diff Toxin by PCR [[id number]] Collected: 04/25/24 1526    Order Status: Completed Updated:  04/25/24 2104     C. diff PCR Negative    Clostridium difficile EIA [[id number]]  (Abnormal) Collected: 04/25/24 1526    Order Status: Completed Specimen: Stool Updated: 04/25/24 1624     C. diff Antigen Positive     C difficile Toxins A+B, EIA Negative     Comment: Testing not recommended for children <24 months old.             CURRENT/PREVIOUS VISIT EKG  Results for orders placed or performed during the hospital encounter of 04/25/24   EKG 12-lead    Collection Time: 04/30/24  2:38 PM   Result Value Ref Range    QRS Duration 62 ms    [facility] QTC Calculation 442 ms    Narrative    Test Reason : R07.9,    Vent. Rate : 079 BPM     Atrial Rate : 079 BPM     P-R Int : 140 ms          QRS Dur : 062 ms      QT Int : 386 ms       P-R-T Axes : 043 013 042 degrees     QTc Int : 442 ms    Normal sinus rhythm  Normal ECG  When compared with ECG of 25-APR-2024 12:25,  No significant change was found    Referred By: [email or web address]   SELF           Confirmed By:        Echocardiography Findings 4/30/24    Left Ventricle The left ventricle is normal in size. Normal wall thickness. Normal wall motion. There is normal systolic function with a visually estimated ejection fraction of 65 - 70%. There is normal diastolic function.   Right Ventricle Normal right ventricular cavity size. Wall thickness is normal. Right ventricle wall motion  is normal. Systolic function is normal.   Left Atrium Normal left atrial size.   Right Atrium Normal right atrial size.   Aortic Valve The aortic valve is structurally normal. There is normal leaflet mobility. Aortic valve peak velocity is 1.40 m/s. Mean gradient is 4 mmHg.   Mitral Valve The mitral valve is structurally normal. There is normal leaflet mobility. The mean pressure gradient across the mitral valve is 2 mmHg at a heart rate of  bpm. There is trace regurgitation.   Tricuspid Valve The tricuspid valve is structurally normal. There is normal leaflet mobility. There is trace regurgitation.    Pulmonic Valve The pulmonic valve is structurally normal. There is normal leaflet mobility.   IVC/SVC Normal venous pressure at 3 mmHg.   Ascending Aorta Aortic root is normal in size. Ascending aorta is normal measuring 2.60 cm.   Pericardium and Other Findings There is a trivial effusion. No indication of cardiac tamponade. Evidence includes no chamber collapse.   LVAD RV/LV ratio is 0.50.       Significant Imaging: I have reviewed all relevant and available imaging results/findings within the past 24 hours.    X-Ray Chest AP Portable 04/25/24 1323    Mild faint nonspecific infrahilar interstitial opacity, new from the previous exam suggesting very mild aspiration/bronchitis/pneumonia or trace edema in the appropriate clinical setting.    X-Ray Chest AP Portable 04/25/24 1504     Interval insertion of a nasogastric tube as described.    CT Chest Abdomen Pelvis Without Contrast  04/25/24 1610   1. Scattered nonspecific bronchiolitis in both lungs, with right lower lobes opacities potentially reflecting chronic bronchopneumonia, given similar appearance to CT of 12/11/2023.   2. Additional stable observations as described.    X-Ray Chest AP Portable 04/25/24 1815   No significant interval detrimental change in the radiographic appearance of the chest as compared with the prior exam on the same date, 04/25/2024.  Nasogastric tube in place.    X-Ray Chest AP Portable 04/26/24 1128    Mild hazy opacification of the right lung base with blunting of the costophrenic angle.  This could reflect atelectasis, infiltrate, pleural effusion or combination there of.    X-Ray Chest AP Portable 04/27/24 1059   Small right pleural effusion and right basilar airspace disease, worsened compared to prior.   Subsegmental atelectasis left lung base.   Interval removal of enteric tube.    X-Ray Abdomen AP 1 View04/30/24 1605   1. Unremarkable bowel gas pattern.   2. Percutaneous gastrostomy tube in the epigastric region.   3. Patchy  hazy opacities of the right lung base could reflect atelectasis or infiltrate.      Roberta Samaniego NP  Date of Service: 05/01/2024      This note was created using [employer] voice recognition software that occasionally misinterpreted phrases or words.

## 2024-05-01 NOTE — PROGRESS NOTES
[facility]  Progress Note    Patient Name: Steff Johnson  MRN: [id number]  Patient Class: IP- Inpatient   Admission Date: 4/25/2024  Length of Stay: 6 days  Attending Physician: Amber Fernández MD  Primary Care Provider: Cristina Ren MD        Subjective:     Principal Problem:Severe sepsis        HPI:  63 yo female with PMH of CVA with known baseline confusion and aphasia who was sent to the ER because of black drainage coming from around the PEG tube site and having black vomit.     On presentation to the ER, vitals showed 101/59, HR of 107, RR of 25 and fever of 102.3, spo2 of 95%. Cbc with wbc of 28 and anemia of 8.8. CMP showed acute renal failure with creatinine of of 2.5, anion gap of 19, hyponatermia of 133, potassium of 5.9. lactic acid of 6.1. Pt started to have projectile hematemesis. NGT was placed. Mathur was replaced after which pt developed hematuria. UA was grossly positive. C.diffi antigen is positive. EKG showed sinus tachycardia. CT C/A/P showed scattered nonspecific bronchiolitis in both lungs, with right lower lobes opacities potentially reflecting chronic bronchopneumonia, given similar appearance to CT of 12/11/2023. Pt was given the hyperkalemia procotol, 1.6 L of fluids, vanco/zosyn, and protonix 80 mg iv. She will be admitted to medicine for further management.     Overview/Hospital Course:  Ms. Johnson is a resident of a local nursing home she was admitted for GI hemorrhage, severe sepsis, COURTNEY, UTI, and C. Diff. while here, her labs and vital signs were monitored and she was maintained on telemetry.  He was placed on IV vancomycin, Merrem, and vancomycin per PEG while awaiting culture results.  Infectious Disease was consulted to assist with management.  She has a chronic Mathur catheter, which was replaced, and she initially developed hematuria, likely from the trauma of the Mathur catheter being inserted, which did resolve.  She was placed on IV Protonix and  GI was consulted.  She underwent an EGD which was negative for any bleeding and showed moderate/severe distal esophagitis and non-erosive gastritis.  Her anticoagulants, aspirin and Eliquis, were then resumed.  She initially had an NG tube, which was discontinued at some point.  Her continuous tube feedings via PEG were resume and she has tolerated.  She was placed on IV fluids her COURTNEY and her renal function improved.  Her urine culture started grew out Klebsiella (/MDRO) and Providencia stuartii and the Merrem was discontinued and she was started on ceftazidime-avibactam.  Her blood cultures were also positive with Staphylococcus haemolyticus and she was continued on IV vancomycin.  Her stool studies were negative for acute C diff infection however she continued on vancomycin per PEG empirically due to her risk for developing C diff. Repeat urine and blood cultures were collected.  He continued to have loose stools, which may have been due to the continuous tube feedings, and she was started on acidophilus per PEG.  She has a chronic sacral decubitus and Wound Care was consulted and followed her for her wound management.  Her wound did not appear to be contributory to her sepsis.                Interval History:  Patient seen and examined.  Overnight notes reviewed.  Leukocytosis trending down.  Id following.  Repeat blood cultures from 04/30 with NGTD.  IV antibiotics per ID.      Review of Systems   Unable to perform ROS: Patient nonverbal     Objective:     Vital Signs (Most Recent):  Temp: 98 °F (36.7 °C) (05/01/24 1107)  Pulse: 74 (05/01/24 1107)  Resp: 18 (05/01/24 1107)  BP: (!) 170/81 (05/01/24 1107)  SpO2: 97 % (05/01/24 1107) Vital Signs (24h Range):  Temp:  [97.9 °F (36.6 °C)-99.6 °F (37.6 °C)] 98 °F (36.7 °C)  Pulse:  [73-88] 74  Resp:  [18] 18  SpO2:  [96 %-100 %] 97 %  BP: (144-185)/(71-90) 170/81     Weight: 53.5 kg (118 lb)  Body mass index is 23.83 kg/m².    Intake/Output Summary (Last 24 hours)  at 5/1/2024 1506  Last data filed at 5/1/2024 0630  Gross per 24 hour   Intake 1420 ml   Output 1850 ml   Net -430 ml         Physical Exam  Vitals and nursing note reviewed.   Constitutional:       Appearance: Normal appearance. She is normal weight.   HENT:      Head: Normocephalic and atraumatic.      Mouth/Throat:      Mouth: Mucous membranes are moist.      Pharynx: Oropharynx is clear.   Eyes:      Extraocular Movements: Extraocular movements intact.      Pupils: Pupils are equal, round, and reactive to light.   Cardiovascular:      Rate and Rhythm: Normal rate and regular rhythm.      Pulses: Normal pulses.      Heart sounds: Normal heart sounds.   Pulmonary:      Effort: Pulmonary effort is normal.      Breath sounds: Normal breath sounds.   Abdominal:      General: Abdomen is flat. Bowel sounds are normal.      Palpations: Abdomen is soft.      Tenderness: There is no abdominal tenderness. There is no guarding or rebound.   Neurological:      Mental Status: She is alert. Mental status is at baseline.             Significant Labs: All pertinent labs within the past 24 hours have been reviewed.  CBC:   Recent Labs   Lab 04/30/24  0649 05/01/24  0545   WBC 23.17* 21.74*   HGB 9.2* 8.5*   HCT 28.6* 26.6*   * 541*     CMP:   Recent Labs   Lab 04/30/24  0649 05/01/24  0546    141   K 4.7 4.3    109   CO2 24 24   * 242*   BUN 35* 29*   CREATININE 0.9 0.7   CALCIUM 8.8 8.6*   PROT 7.0 6.7   ALBUMIN 2.7* 2.6*   BILITOT 0.3 0.3   ALKPHOS 165* 136*   AST 47* 22   ALT 63* 43   ANIONGAP 7* 8       Significant Imaging: I have reviewed all pertinent imaging results/findings within the past 24 hours.    Assessment/Plan:      * Severe sepsis  This patient does have evidence of infective focus  My overall impression is  severe sepsis :  Evidenced by leukocytosis, fever, current infection, and lactic acidosis.  Source: Urinary Tract  Antibiotics given-   Antibiotics (72h ago, onward)      Start      "Stop Route Frequency Ordered    05/01/24 1800  vancomycin 750 mg in dextrose 5 % 250 mL IVPB (ready to mix)        Note to Pharmacy: Ht: 4' 11" (1.499 m)  Wt: 53.5 kg (118 lb)  Estimated Creatinine Clearance: 47.4 mL/min (based on SCr of 0.9 mg/dL).  Body mass index is 23.83 kg/m².    -- IV Every 24 hours (non-standard times) 04/30/24 1618    04/30/24 1800  vancomycin 125 mg/5 mL oral solution 125 mg  (C. difficile Infection (CDI) Treatment Order Panel)         -- PER G TUBE Every 6 hours 04/30/24 1533    04/30/24 1653  vancomycin - pharmacy to dose  (vancomycin IVPB (PEDS and ADULTS))        Placed in "And" Linked Group    -- IV pharmacy to manage frequency 04/30/24 1554    04/29/24 0800  cefTAZidime-avibactam (AVYCAZ) 1.25 g in dextrose 5 % (D5W) 100 mL         -- IV Every 8 hours (non-standard times) 04/29/24 0046    04/25/24 2100  mupirocin 2 % ointment         04/30/24 2059 Nasl 2 times daily 04/25/24 1905          Latest lactate reviewed-  No results for input(s): "LACTATE", "POCLAC" in the last 72 hours.  Organ dysfunction indicated by Acute kidney injury-- which has resolved    Fluid challenge Not needed - patient is not hypotensive      Post- resuscitation assessment No - Post resuscitation assessment not needed       Will Not start Pressors- Levophed for MAP of 65  Source control achieved by:  IV antibiotics  ---------------------------------------------------------------  It is noted that patient has history of recurrent episodes of sepsis due to complicated UTI.  Awaiting urine culture results  Procalcitonin 8.983.  Not sure if this level is improved since an initial level was not obtained when patient was admitted.  Highest Procalcitonin was 133 with patient's last episode of sepsis 12/2/23.  Lactic acidosis resolved  Urine culture positive Klebsiella (/MDRO) and Providencia stuartii/ repeat urine culture negative so far  Blood cultures collected 4/25 (+) Staphylococcus haemolyticus/ repeat blood " "cultures collected 4/29 negative so far  Stool negative for acute C diff infection  Continue IV vancomycin and ceftazidime-avibactam   Continue IV fluid hydration  Fevers are slowly improving. Had temp 202.2 4/28 @ 2245.  First dose of ceftazidime-avibactam given 4/29 @ 0035.  T-max since starting new antibiotic was 100.9 @ 4/29 0420.  Last antipyretic given 4/29 @2130.      Bacteremia  Blood cultures collected 4/25 (+) Staphylococcus haemolyticus  Repeat blood cultures collected 4/29  and 4/30 negative so far   Continue IV vancomycin      Encephalopathy chronic        Quadriplegia  Chronic/due to history of CVA  Continue pressure relief measures      History of ischemic stroke  Continue prevention: statin, ASA, Eliquis     Acute kidney injury  Essentially resolved/creatinine normal.    Patient with acute kidney injury/acute renal failure likely due to pre-renal azotemia due to IVVD 2/2 GIB and Sepsis. COURTNEY is currently stable. Baseline creatinine  0.7  - Labs reviewed- Renal function/electrolytes with Estimated Creatinine Clearance: 60.9 mL/min (based on SCr of 0.7 mg/dL). according to latest data. Monitor urine output and serial BMP and adjust therapy as needed. Avoid nephrotoxins and renally dose meds for GFR listed above.      Type 2 diabetes mellitus with neurologic complication, with long-term current use of insulin  Patient's FSGs are uncontrolled due to hyperglycemia on current medication regimen.  Last A1c reviewed-   Lab Results   Component Value Date    HGBA1C 7.8 (H) 12/05/2023     Most recent fingerstick glucose reviewed- No results for input(s): "POCTGLUCOSE" in the last 24 hours.  Current correctional scale  Low  Increase anti-hyperglycemic dose as follows-   Antihyperglycemics (From admission, onward)      Start     Stop Route Frequency Ordered    04/30/24 2100  insulin detemir U-100 (Levemir) pen 18 Units         -- SubQ Nightly 04/30/24 1550    04/30/24 1649  insulin aspart U-100 pen 0-10 Units      "    -- SubQ Every 6 hours PRN 04/30/24 1550          Hold Oral hypoglycemics while patient is in the hospital.  -----------------------------------------------  Increase basal insulin to 18 units nightly and Increase SSI to medium dose      Hypertension  Chronic.    Temp:  [97.9 °F (36.6 °C)-99.6 °F (37.6 °C)]   Pulse:  [73-88]   Resp:  [18]   BP: (144-185)/(71-90)   SpO2:  [96 %-100 %] .   Home meds for hypertension were reviewed and noted below.   Hypertension Medications               amLODIPine (NORVASC) 10 MG tablet 1 tablet (10 mg total) by Per G Tube route once daily.    furosemide (LASIX) 40 MG tablet 40 mg by Per G Tube route once daily.    losartan (COZAAR) 100 MG tablet 1 tablet (100 mg total) by Per G Tube route once daily.    metoprolol tartrate (LOPRESSOR) 25 MG tablet 25 mg by Per G Tube route 2 (two) times daily.    cloNIDine (CATAPRES) 0.1 MG tablet 1 tablet (0.1 mg total) by Per G Tube route 3 (three) times daily.          Will order clonidine 0.1 q8h prn only in case pt went into rebound HTN.5/1: pt consistently hypertensive added home metoprolol     Clostridium difficile infection  Stool positive C diff antigen, negative toxins, Negative C. Diff PCR---> not acute C. Diff infection  ID following-- continue oral vancomycin for now  Diarrhea possibly from tube feedings  Start lactobacillus 2 tabs bid    Complicated UTI (urinary tract infection)  History of chronic Mathur catheter  Catheter changed on 04/25/24   Urine culture collected 4/23 (+) Klebsiella pneumoniae (/MDRO) and Providencia stuartii  Urine culture collected 4/29 negative so far  Continue ceftazidime-avibactam (Started 4/29)  UA collected 4/29 improved when compared to UA collected 4/25: Bact- few (4/29),Many (4/25)/ Color- yellow (was red 4/25)/ Blood- 1+ (was 3+)/ still with pyuria and 3+ Leuk Est/ Remains Nitrite neg  Awaiting urine culture results    Acute blood loss anemia  Patient's anemia is currently controlled. Has received 2  units of PRBCs on 4/24 . Etiology likely d/t acute blood loss which was from GIB  Current CBC reviewed-   Lab Results   Component Value Date    HGB 8.5 (L) 05/01/2024    HCT 26.6 (L) 05/01/2024     Monitor serial CBC and transfuse if patient becomes hemodynamically unstable, symptomatic or H/H drops below 7/21.    Hyperkalemia  Patient has hypokalemia which is Acute and currently controlled. Most recent potassium levels reviewed-   Lab Results   Component Value Date    K 4.3 05/01/2024   . Will continue potassium replacement per protocol and recheck repeat levels after replacement completed.   -----------------------------------------------------------------------  Potassium currently normal        GI hemorrhage  GI consulted: S/P EGD: moderate/severe distal esophagitis, no bleeding.  Aspirin Eliquis has been resumed  Continue IV Protonix b.i.d.    Seizure disorder  Controlled.  Continue chronic antiepileptic.      Sacral decubitus ulcer, stage III  Chronic issue  Wound Care following  Continue current wound care regimen   Continue pressure relief measures      Aphasia  Due to history of CVA        VTE Risk Mitigation (From admission, onward)           Ordered     apixaban tablet 2.5 mg  2 times daily         05/01/24 1524     IP VTE HIGH RISK PATIENT  Once         04/25/24 1903     Place sequential compression device  Until discontinued         04/25/24 1903                    Discharge Planning   ALEX: 5/3/2024     Code Status: Full Code   Is the patient medically ready for discharge?:     Reason for patient still in hospital (select all that apply): Patient trending condition, Laboratory test, Treatment, Consult recommendations, and Pending disposition  Discharge Plan A: Return to nursing home                  Lizett Scales PA-C  Department of Hospital Medicine   [facility]

## 2024-05-01 NOTE — SUBJECTIVE & OBJECTIVE
Interval History:  Patient seen and examined.  Overnight notes reviewed.  Leukocytosis trending down.  Id following.  Repeat blood cultures from 04/30 with NGTD.  IV antibiotics per ID.      Review of Systems   Unable to perform ROS: Patient nonverbal     Objective:     Vital Signs (Most Recent):  Temp: 98 °F (36.7 °C) (05/01/24 1107)  Pulse: 74 (05/01/24 1107)  Resp: 18 (05/01/24 1107)  BP: (!) 170/81 (05/01/24 1107)  SpO2: 97 % (05/01/24 1107) Vital Signs (24h Range):  Temp:  [97.9 °F (36.6 °C)-99.6 °F (37.6 °C)] 98 °F (36.7 °C)  Pulse:  [73-88] 74  Resp:  [18] 18  SpO2:  [96 %-100 %] 97 %  BP: (144-185)/(71-90) 170/81     Weight: 53.5 kg (118 lb)  Body mass index is 23.83 kg/m².    Intake/Output Summary (Last 24 hours) at 5/1/2024 1506  Last data filed at 5/1/2024 0630  Gross per 24 hour   Intake 1420 ml   Output 1850 ml   Net -430 ml         Physical Exam  Vitals and nursing note reviewed.   Constitutional:       Appearance: Normal appearance. She is normal weight.   HENT:      Head: Normocephalic and atraumatic.      Mouth/Throat:      Mouth: Mucous membranes are moist.      Pharynx: Oropharynx is clear.   Eyes:      Extraocular Movements: Extraocular movements intact.      Pupils: Pupils are equal, round, and reactive to light.   Cardiovascular:      Rate and Rhythm: Normal rate and regular rhythm.      Pulses: Normal pulses.      Heart sounds: Normal heart sounds.   Pulmonary:      Effort: Pulmonary effort is normal.      Breath sounds: Normal breath sounds.   Abdominal:      General: Abdomen is flat. Bowel sounds are normal.      Palpations: Abdomen is soft.      Tenderness: There is no abdominal tenderness. There is no guarding or rebound.   Neurological:      Mental Status: She is alert. Mental status is at baseline.             Significant Labs: All pertinent labs within the past 24 hours have been reviewed.  CBC:   Recent Labs   Lab 04/30/24  0649 05/01/24  0545   WBC 23.17* 21.74*   HGB 9.2* 8.5*   HCT  28.6* 26.6*   * 541*     CMP:   Recent Labs   Lab 04/30/24  0649 05/01/24  0546    141   K 4.7 4.3    109   CO2 24 24   * 242*   BUN 35* 29*   CREATININE 0.9 0.7   CALCIUM 8.8 8.6*   PROT 7.0 6.7   ALBUMIN 2.7* 2.6*   BILITOT 0.3 0.3   ALKPHOS 165* 136*   AST 47* 22   ALT 63* 43   ANIONGAP 7* 8       Significant Imaging: I have reviewed all pertinent imaging results/findings within the past 24 hours.

## 2024-05-01 NOTE — ASSESSMENT & PLAN NOTE
"This patient does have evidence of infective focus  My overall impression is  severe sepsis :  Evidenced by leukocytosis, fever, current infection, and lactic acidosis.  Source: Urinary Tract  Antibiotics given-   Antibiotics (72h ago, onward)      Start     Stop Route Frequency Ordered    05/01/24 1800  vancomycin 750 mg in dextrose 5 % 250 mL IVPB (ready to mix)        Note to Pharmacy: Ht: 4' 11" (1.499 m)  Wt: 53.5 kg (118 lb)  Estimated Creatinine Clearance: 47.4 mL/min (based on SCr of 0.9 mg/dL).  Body mass index is 23.83 kg/m².    -- IV Every 24 hours (non-standard times) 04/30/24 1618    04/30/24 1800  vancomycin 125 mg/5 mL oral solution 125 mg  (C. difficile Infection (CDI) Treatment Order Panel)         -- PER G TUBE Every 6 hours 04/30/24 1533    04/30/24 1653  vancomycin - pharmacy to dose  (vancomycin IVPB (PEDS and ADULTS))        Placed in "And" Linked Group    -- IV pharmacy to manage frequency 04/30/24 1554    04/29/24 0800  cefTAZidime-avibactam (AVYCAZ) 1.25 g in dextrose 5 % (D5W) 100 mL         -- IV Every 8 hours (non-standard times) 04/29/24 0046    04/25/24 2100  mupirocin 2 % ointment         04/30/24 2059 Nasl 2 times daily 04/25/24 1905          Latest lactate reviewed-  No results for input(s): "LACTATE", "POCLAC" in the last 72 hours.  Organ dysfunction indicated by Acute kidney injury-- which has resolved    Fluid challenge Not needed - patient is not hypotensive      Post- resuscitation assessment No - Post resuscitation assessment not needed       Will Not start Pressors- Levophed for MAP of 65  Source control achieved by:  IV antibiotics  ---------------------------------------------------------------  It is noted that patient has history of recurrent episodes of sepsis due to complicated UTI.  Awaiting urine culture results  Procalcitonin 8.983.  Not sure if this level is improved since an initial level was not obtained when patient was admitted.  Highest Procalcitonin was 133 " with patient's last episode of sepsis 12/2/23.  Lactic acidosis resolved  Urine culture positive Klebsiella (/MDRO) and Providencia stuartii/ repeat urine culture negative so far  Blood cultures collected 4/25 (+) Staphylococcus haemolyticus/ repeat blood cultures collected 4/29 negative so far  Stool negative for acute C diff infection  Continue IV vancomycin and ceftazidime-avibactam   Continue IV fluid hydration  Fevers are slowly improving. Had temp 202.2 4/28 @ 2245.  First dose of ceftazidime-avibactam given 4/29 @ 0035.  T-max since starting new antibiotic was 100.9 @ 4/29 0420.  Last antipyretic given 4/29 @2130.

## 2024-05-01 NOTE — ASSESSMENT & PLAN NOTE
Chronic issue  Wound Care following  Continue current wound care regimen   Continue pressure relief measures

## 2024-05-01 NOTE — HOSPITAL COURSE
Ms. Johnson is a resident of a local nursing home she was admitted for GI hemorrhage, severe sepsis, COURTNEY, UTI, and C. Diff. while here, her labs and vital signs were monitored and she was maintained on telemetry.  She was placed on IV vancomycin, Merrem, and vancomycin per PEG while awaiting culture results.  Infectious Disease was consulted to assist with management.  She has a chronic Mathur catheter, which was replaced, and she initially developed hematuria, likely from the trauma of the Mathur catheter being inserted, which did resolve.  She was placed on IV Protonix and GI was consulted.  She underwent an EGD which was negative for any bleeding and showed moderate/severe distal esophagitis and non-erosive gastritis.  Her anticoagulants, aspirin and Eliquis, were then resumed.  She initially had an NG tube, which was discontinued at some point.  Her continuous tube feedings via PEG were resume and she has tolerated.  She was placed on IV fluids her COURTNEY and her renal function improved.  Her urine culture started grew out Klebsiella (/MDRO) and Providencia stuartii and the Merrem was discontinued and she was started on ceftazidime-avibactam.  Her blood cultures were also positive with Staphylococcus haemolyticus and she was continued on IV vancomycin.  Her stool studies were negative for acute C diff infection however she continued on vancomycin per PEG empirically due to her risk for developing C diff. Repeat urine and blood cultures were collected.  He continued to have loose stools, which may have been due to the continuous tube feedings, and she was started on acidophilus per PEG.  She has a chronic sacral decubitus and Wound Care was consulted and followed her for her wound management.  Her wound did not appear to be contributory to her sepsis.  Repeat blood cultures were negative.  GI PCR revealed Salmonella and Levaquin was added.  Patient completed course of ceftazidime-avibactam while hospitalized.   Palliative Care was consulted and patient was made a DNR.  Hematology was consulted for thrombocytosis.  Id recommended Vancomycin one gram IV every 24 hours through 5/14, Levofloxacin 750 mg IV daily through 5/8, and Vancomycin oral 125 mg per PEG tube twice daily through May 14th and signed off. Patients met with palliative a second time and decided they would like to continue with medical treatment and not place patient on hospice.  Patient was cleared for discharge back to nursing home.  Case management followed for discharge plan.  Inpatient facility transfer orders placed in sent to facility which included the above-stated recommendations from Infectious Disease.      Patient seen and examined on day of discharge.  Family updated on discharge planning, and verbalized understanding.  Patient was discharged back to [facility] and transportation was arranged by case management.

## 2024-05-01 NOTE — PROGRESS NOTES
Acute kidney injury has resolved.    Avoid non-essential nephrotoxic agents for a couple of weeks  Keep euvolemic  Keep mean arterial bp above 55    Thank your for the referral.  Please call if further assistance is needed    Irving Williamson MD  Nephrology  [facility] [place]  [phone]

## 2024-05-01 NOTE — ASSESSMENT & PLAN NOTE
"Patient's FSGs are uncontrolled due to hyperglycemia on current medication regimen.  Last A1c reviewed-   Lab Results   Component Value Date    HGBA1C 7.8 (H) 12/05/2023     Most recent fingerstick glucose reviewed- No results for input(s): "POCTGLUCOSE" in the last 24 hours.  Current correctional scale  Low  Increase anti-hyperglycemic dose as follows-   Antihyperglycemics (From admission, onward)    Start     Stop Route Frequency Ordered    04/30/24 2100  insulin detemir U-100 (Levemir) pen 18 Units         -- SubQ Nightly 04/30/24 1550    04/30/24 1649  insulin aspart U-100 pen 0-10 Units         -- SubQ Every 6 hours PRN 04/30/24 1550        Hold Oral hypoglycemics while patient is in the hospital.  -----------------------------------------------  Increase basal insulin to 18 units nightly and Increase SSI to medium dose    "

## 2024-05-01 NOTE — ASSESSMENT & PLAN NOTE
Blood cultures collected 4/25 (+) Staphylococcus haemolyticus  Repeat blood cultures collected 4/29  and 4/30 negative so far   Continue IV vancomycin

## 2024-05-02 LAB
ALBUMIN SERPL BCP-MCNC: 2.5 G/DL (ref 3.5–5.2)
ALP SERPL-CCNC: 119 U/L (ref 55–135)
ALT SERPL W/O P-5'-P-CCNC: 39 U/L (ref 10–44)
ANION GAP SERPL CALC-SCNC: 7 MMOL/L (ref 8–16)
ANISOCYTOSIS BLD QL SMEAR: SLIGHT
AST SERPL-CCNC: 26 U/L (ref 10–40)
ASTROVIRUS: NOT DETECTED
BACTERIA SPEC AEROBE CULT: NORMAL
BACTERIA UR CULT: NO GROWTH
BASOPHILS NFR BLD: 0 % (ref 0–1.9)
BILIRUB SERPL-MCNC: 0.3 MG/DL (ref 0.1–1)
BNP SERPL-MCNC: 82 PG/ML (ref 0–99)
BUN SERPL-MCNC: 24 MG/DL (ref 8–23)
C COLI+JEJ+UPSA DNA STL QL NAA+NON-PROBE: NOT DETECTED
CALCIUM SERPL-MCNC: 8.5 MG/DL (ref 8.7–10.5)
CHLORIDE SERPL-SCNC: 108 MMOL/L (ref 95–110)
CO2 SERPL-SCNC: 24 MMOL/L (ref 23–29)
CREAT SERPL-MCNC: 0.7 MG/DL (ref 0.5–1.4)
CYCLOSPORA CAYETANENSIS: NOT DETECTED
DIFFERENTIAL METHOD BLD: ABNORMAL
ENTEROAGGREGATIVE E COLI: NOT DETECTED
ENTEROPATHOGENIC E COLI: NOT DETECTED
EOSINOPHIL NFR BLD: 0 % (ref 0–8)
ERYTHROCYTE [DISTWIDTH] IN BLOOD BY AUTOMATED COUNT: 18.5 % (ref 11.5–14.5)
EST. GFR  (NO RACE VARIABLE): >60 ML/MIN/1.73 M^2
GLUCOSE SERPL-MCNC: 207 MG/DL (ref 70–110)
GLUCOSE SERPL-MCNC: 213 MG/DL (ref 70–110)
GLUCOSE SERPL-MCNC: 225 MG/DL (ref 70–110)
GLUCOSE SERPL-MCNC: 228 MG/DL (ref 70–110)
GLUCOSE SERPL-MCNC: 253 MG/DL (ref 70–110)
GLUCOSE SERPL-MCNC: 310 MG/DL (ref 70–110)
GPP - ADENOVIRUS 40/41: NOT DETECTED
GPP - CRYPTOSPORIDIUM: NOT DETECTED
GPP - ENTAMOEBA HISTOLYTICA: NOT DETECTED
GPP - ENTEROTOXIGENIC E COLI (ETEC): NOT DETECTED
GPP - GIARDIA LAMBLIA: NOT DETECTED
GPP - NOROVIRUS GI/GII: NOT DETECTED
GPP - ROTAVIRUS A: NOT DETECTED
GPP - SALMONELLA: DETECTED
GPP - VIBRIO CHOLERA: NOT DETECTED
GPP - YERSINIA ENTEROCOLITICA: NOT DETECTED
GRAM STN SPEC: NORMAL
HCT VFR BLD AUTO: 25 % (ref 37–48.5)
HGB BLD-MCNC: 7.9 G/DL (ref 12–16)
HGB BLD-MCNC: 8.2 G/DL (ref 12–16)
HGB BLD-MCNC: 8.2 G/DL (ref 12–16)
HGB BLD-MCNC: 8.3 G/DL (ref 12–16)
HYPOCHROMIA BLD QL SMEAR: ABNORMAL
IMM GRANULOCYTES # BLD AUTO: ABNORMAL K/UL (ref 0–0.04)
IMM GRANULOCYTES NFR BLD AUTO: ABNORMAL % (ref 0–0.5)
LACTATE PLASV-SCNC: NOT DETECTED MMOL/L
LACTATE SERPL-SCNC: 1.8 MMOL/L (ref 0.5–1.9)
LYMPHOCYTES NFR BLD: 16 % (ref 18–48)
MAGNESIUM SERPL-MCNC: 1.8 MG/DL (ref 1.6–2.6)
MCH RBC QN AUTO: 28.4 PG (ref 27–31)
MCHC RBC AUTO-ENTMCNC: 32.8 G/DL (ref 32–36)
MCV RBC AUTO: 87 FL (ref 82–98)
METAMYELOCYTES NFR BLD MANUAL: 1 %
MONOCYTES NFR BLD: 3 % (ref 4–15)
MYELOCYTES NFR BLD MANUAL: 4 %
NEUTROPHILS NFR BLD: 72 % (ref 38–73)
NEUTS BAND NFR BLD MANUAL: 4 %
NRBC BLD-RTO: 0 /100 WBC
PLATELET # BLD AUTO: 571 K/UL (ref 150–450)
PLESIOMONAS SHIGELLOIDES: NOT DETECTED
PMV BLD AUTO: 10.6 FL (ref 9.2–12.9)
POTASSIUM SERPL-SCNC: 4.8 MMOL/L (ref 3.5–5.1)
PROT SERPL-MCNC: 6.4 G/DL (ref 6–8.4)
RBC # BLD AUTO: 2.89 M/UL (ref 4–5.4)
SAPOVIRUS: NOT DETECTED
SHIGELLA SP+EIEC IPAH STL QL NAA+PROBE: NOT DETECTED
SODIUM SERPL-SCNC: 139 MMOL/L (ref 136–145)
VIBRIO: NOT DETECTED
WBC # BLD AUTO: 23.69 K/UL (ref 3.9–12.7)

## 2024-05-02 PROCEDURE — 87205 SMEAR GRAM STAIN: CPT | Mod: 59 | Performed by: NURSE PRACTITIONER

## 2024-05-02 PROCEDURE — 25000003 PHARM REV CODE 250: Performed by: NURSE PRACTITIONER

## 2024-05-02 PROCEDURE — 83880 ASSAY OF NATRIURETIC PEPTIDE: CPT | Performed by: HOSPITALIST

## 2024-05-02 PROCEDURE — 99233 SBSQ HOSP IP/OBS HIGH 50: CPT | Mod: FS,,, | Performed by: NURSE PRACTITIONER

## 2024-05-02 PROCEDURE — 25000003 PHARM REV CODE 250: Performed by: INTERNAL MEDICINE

## 2024-05-02 PROCEDURE — 63600175 PHARM REV CODE 636 W HCPCS: Mod: JZ,JG | Performed by: NURSE PRACTITIONER

## 2024-05-02 PROCEDURE — 87070 CULTURE OTHR SPECIMN AEROBIC: CPT | Performed by: NURSE PRACTITIONER

## 2024-05-02 PROCEDURE — 94761 N-INVAS EAR/PLS OXIMETRY MLT: CPT

## 2024-05-02 PROCEDURE — 85018 HEMOGLOBIN: CPT | Performed by: HOSPITALIST

## 2024-05-02 PROCEDURE — C9113 INJ PANTOPRAZOLE SODIUM, VIA: HCPCS | Performed by: INTERNAL MEDICINE

## 2024-05-02 PROCEDURE — 99900035 HC TECH TIME PER 15 MIN (STAT)

## 2024-05-02 PROCEDURE — 27000221 HC OXYGEN, UP TO 24 HOURS

## 2024-05-02 PROCEDURE — 25000003 PHARM REV CODE 250

## 2024-05-02 PROCEDURE — 87070 CULTURE OTHR SPECIMN AEROBIC: CPT | Mod: 59 | Performed by: NURSE PRACTITIONER

## 2024-05-02 PROCEDURE — 80053 COMPREHEN METABOLIC PANEL: CPT | Performed by: HOSPITALIST

## 2024-05-02 PROCEDURE — 87040 BLOOD CULTURE FOR BACTERIA: CPT | Performed by: INTERNAL MEDICINE

## 2024-05-02 PROCEDURE — 31720 CLEARANCE OF AIRWAYS: CPT

## 2024-05-02 PROCEDURE — 85018 HEMOGLOBIN: CPT | Mod: 91 | Performed by: HOSPITALIST

## 2024-05-02 PROCEDURE — 85007 BL SMEAR W/DIFF WBC COUNT: CPT | Performed by: HOSPITALIST

## 2024-05-02 PROCEDURE — 12000002 HC ACUTE/MED SURGE SEMI-PRIVATE ROOM

## 2024-05-02 PROCEDURE — 63600175 PHARM REV CODE 636 W HCPCS: Performed by: HOSPITALIST

## 2024-05-02 PROCEDURE — 83605 ASSAY OF LACTIC ACID: CPT | Performed by: STUDENT IN AN ORGANIZED HEALTH CARE EDUCATION/TRAINING PROGRAM

## 2024-05-02 PROCEDURE — 63600175 PHARM REV CODE 636 W HCPCS: Performed by: NURSE PRACTITIONER

## 2024-05-02 PROCEDURE — 63600175 PHARM REV CODE 636 W HCPCS: Performed by: INTERNAL MEDICINE

## 2024-05-02 PROCEDURE — 85027 COMPLETE CBC AUTOMATED: CPT | Performed by: HOSPITALIST

## 2024-05-02 PROCEDURE — 36415 COLL VENOUS BLD VENIPUNCTURE: CPT | Performed by: HOSPITALIST

## 2024-05-02 PROCEDURE — 25000242 PHARM REV CODE 250 ALT 637 W/ HCPCS: Performed by: HOSPITALIST

## 2024-05-02 PROCEDURE — 94760 N-INVAS EAR/PLS OXIMETRY 1: CPT

## 2024-05-02 PROCEDURE — 83735 ASSAY OF MAGNESIUM: CPT | Performed by: HOSPITALIST

## 2024-05-02 PROCEDURE — 99900031 HC PATIENT EDUCATION (STAT)

## 2024-05-02 PROCEDURE — 63600175 PHARM REV CODE 636 W HCPCS: Mod: JZ,JG | Performed by: INTERNAL MEDICINE

## 2024-05-02 PROCEDURE — 87507 IADNA-DNA/RNA PROBE TQ 12-25: CPT | Performed by: NURSE PRACTITIONER

## 2024-05-02 PROCEDURE — 25000003 PHARM REV CODE 250: Performed by: STUDENT IN AN ORGANIZED HEALTH CARE EDUCATION/TRAINING PROGRAM

## 2024-05-02 PROCEDURE — 25000003 PHARM REV CODE 250: Performed by: HOSPITALIST

## 2024-05-02 PROCEDURE — 94640 AIRWAY INHALATION TREATMENT: CPT

## 2024-05-02 PROCEDURE — 63600175 PHARM REV CODE 636 W HCPCS: Performed by: STUDENT IN AN ORGANIZED HEALTH CARE EDUCATION/TRAINING PROGRAM

## 2024-05-02 RX ORDER — THIAMINE HCL 100 MG
100 TABLET ORAL DAILY
Status: DISCONTINUED | OUTPATIENT
Start: 2024-05-02 | End: 2024-05-08 | Stop reason: HOSPADM

## 2024-05-02 RX ORDER — LATANOPROST 50 UG/ML
1 SOLUTION/ DROPS OPHTHALMIC NIGHTLY
Status: DISCONTINUED | OUTPATIENT
Start: 2024-05-02 | End: 2024-05-08 | Stop reason: HOSPADM

## 2024-05-02 RX ORDER — FOLIC ACID 1 MG/1
1000 TABLET ORAL EVERY MORNING
Status: DISCONTINUED | OUTPATIENT
Start: 2024-05-02 | End: 2024-05-08 | Stop reason: HOSPADM

## 2024-05-02 RX ORDER — LEVOFLOXACIN 5 MG/ML
750 INJECTION, SOLUTION INTRAVENOUS
Status: DISCONTINUED | OUTPATIENT
Start: 2024-05-02 | End: 2024-05-08 | Stop reason: HOSPADM

## 2024-05-02 RX ORDER — ALLOPURINOL 100 MG/1
100 TABLET ORAL DAILY
Status: DISCONTINUED | OUTPATIENT
Start: 2024-05-02 | End: 2024-05-08 | Stop reason: HOSPADM

## 2024-05-02 RX ADMIN — LATANOPROST 1 DROP: 50 SOLUTION/ DROPS OPHTHALMIC at 09:05

## 2024-05-02 RX ADMIN — CEFTAZIDIME, AVIBACTAM 1.25 G: 2; .5 POWDER, FOR SOLUTION INTRAVENOUS at 06:05

## 2024-05-02 RX ADMIN — LEVOFLOXACIN 750 MG: 5 INJECTION, SOLUTION INTRAVENOUS at 04:05

## 2024-05-02 RX ADMIN — THIAMINE HCL TAB 100 MG 100 MG: 100 TAB at 04:05

## 2024-05-02 RX ADMIN — INSULIN ASPART 4 UNITS: 100 INJECTION, SOLUTION INTRAVENOUS; SUBCUTANEOUS at 05:05

## 2024-05-02 RX ADMIN — APIXABAN 2.5 MG: 2.5 TABLET, FILM COATED ORAL at 09:05

## 2024-05-02 RX ADMIN — ALLOPURINOL 100 MG: 100 TABLET ORAL at 04:05

## 2024-05-02 RX ADMIN — LEVETIRACETAM 500 MG: 5 INJECTION INTRAVENOUS at 09:05

## 2024-05-02 RX ADMIN — CEFTAZIDIME, AVIBACTAM 1.25 G: 2; .5 POWDER, FOR SOLUTION INTRAVENOUS at 12:05

## 2024-05-02 RX ADMIN — ATORVASTATIN CALCIUM 80 MG: 40 TABLET, FILM COATED ORAL at 09:05

## 2024-05-02 RX ADMIN — VANCOMYCIN HYDROCHLORIDE 125 MG: KIT at 12:05

## 2024-05-02 RX ADMIN — INSULIN ASPART 4 UNITS: 100 INJECTION, SOLUTION INTRAVENOUS; SUBCUTANEOUS at 11:05

## 2024-05-02 RX ADMIN — LOPERAMIDE HYDROCHLORIDE 2 MG: 1 SOLUTION ORAL at 10:05

## 2024-05-02 RX ADMIN — ASPIRIN 81 MG 81 MG: 81 TABLET ORAL at 10:05

## 2024-05-02 RX ADMIN — PANTOPRAZOLE SODIUM 40 MG: 40 INJECTION, POWDER, FOR SOLUTION INTRAVENOUS at 10:05

## 2024-05-02 RX ADMIN — IPRATROPIUM BROMIDE AND ALBUTEROL SULFATE 3 ML: 2.5; .5 SOLUTION RESPIRATORY (INHALATION) at 07:05

## 2024-05-02 RX ADMIN — FOLIC ACID 1000 MCG: 1 TABLET ORAL at 04:05

## 2024-05-02 RX ADMIN — PANTOPRAZOLE SODIUM 40 MG: 40 INJECTION, POWDER, FOR SOLUTION INTRAVENOUS at 09:05

## 2024-05-02 RX ADMIN — LEVETIRACETAM 500 MG: 5 INJECTION INTRAVENOUS at 10:05

## 2024-05-02 RX ADMIN — APIXABAN 2.5 MG: 2.5 TABLET, FILM COATED ORAL at 10:05

## 2024-05-02 RX ADMIN — METOPROLOL TARTRATE 25 MG: 25 TABLET, FILM COATED ORAL at 09:05

## 2024-05-02 RX ADMIN — AMLODIPINE BESYLATE 10 MG: 5 TABLET ORAL at 10:05

## 2024-05-02 RX ADMIN — METOPROLOL TARTRATE 25 MG: 25 TABLET, FILM COATED ORAL at 10:05

## 2024-05-02 RX ADMIN — VANCOMYCIN HYDROCHLORIDE 750 MG: 750 INJECTION, POWDER, LYOPHILIZED, FOR SOLUTION INTRAVENOUS at 07:05

## 2024-05-02 RX ADMIN — IPRATROPIUM BROMIDE AND ALBUTEROL SULFATE 3 ML: 2.5; .5 SOLUTION RESPIRATORY (INHALATION) at 08:05

## 2024-05-02 RX ADMIN — CEFTAZIDIME, AVIBACTAM 1.25 G: 2; .5 POWDER, FOR SOLUTION INTRAVENOUS at 11:05

## 2024-05-02 RX ADMIN — VANCOMYCIN HYDROCHLORIDE 125 MG: KIT at 05:05

## 2024-05-02 RX ADMIN — IPRATROPIUM BROMIDE AND ALBUTEROL SULFATE 3 ML: 2.5; .5 SOLUTION RESPIRATORY (INHALATION) at 12:05

## 2024-05-02 RX ADMIN — CLONIDINE HYDROCHLORIDE 0.1 MG: 0.1 TABLET ORAL at 05:05

## 2024-05-02 NOTE — CONSULTS
Left medial leg wound pink wound bed, cleaned and dried Medihoney and mepilex, sacral stage 3 beefy red slight bleeding, scarred periwound. Right buttock stage 2 1.1x1.3x0.1cm pink wound bed applied Medihoney covered with mepilex turned to the right side. Pillows between knees heels floated, left arm elevated.

## 2024-05-02 NOTE — ASSESSMENT & PLAN NOTE
"This patient does have evidence of infective focus  My overall impression is  severe sepsis :  Evidenced by leukocytosis, fever, current infection, and lactic acidosis.  Source: Urinary Tract  Antibiotics given-   Antibiotics (72h ago, onward)      Start     Stop Route Frequency Ordered    05/02/24 1400  levoFLOXacin 750 mg/150 mL IVPB 750 mg         -- IV Every 24 hours (non-standard times) 05/02/24 1250    05/01/24 1800  vancomycin 750 mg in dextrose 5 % 250 mL IVPB (ready to mix)        Note to Pharmacy: Ht: 4' 11" (1.499 m)  Wt: 53.5 kg (118 lb)  Estimated Creatinine Clearance: 47.4 mL/min (based on SCr of 0.9 mg/dL).  Body mass index is 23.83 kg/m².    -- IV Every 24 hours (non-standard times) 04/30/24 1618    04/30/24 1653  vancomycin - pharmacy to dose  (vancomycin IVPB (PEDS and ADULTS))        Placed in "And" Linked Group    -- IV pharmacy to manage frequency 04/30/24 1554    04/29/24 0800  cefTAZidime-avibactam (AVYCAZ) 1.25 g in dextrose 5 % (D5W) 100 mL         -- IV Every 8 hours (non-standard times) 04/29/24 0046    04/25/24 2100  mupirocin 2 % ointment         04/30/24 2059 Nasl 2 times daily 04/25/24 1905          Latest lactate reviewed-  Recent Labs   Lab 05/02/24  0400   LACTATE 1.8     Organ dysfunction indicated by Acute kidney injury-- which has resolved    Fluid challenge Not needed - patient is not hypotensive      Post- resuscitation assessment No - Post resuscitation assessment not needed       Will Not start Pressors- Levophed for MAP of 65  Source control achieved by:  IV antibiotics  ---------------------------------------------------------------  It is noted that patient has history of recurrent episodes of sepsis due to complicated UTI.  Awaiting urine culture results  Procalcitonin 8.983.  Not sure if this level is improved since an initial level was not obtained when patient was admitted.  Highest Procalcitonin was 133 with patient's last episode of sepsis 12/2/23.  Lactic acidosis " resolved  Urine culture positive Klebsiella (/MDRO) and Providencia stuartii/ repeat urine culture negative so far  Blood cultures collected 4/25 (+) Staphylococcus haemolyticus/ repeat blood cultures collected 4/29 negative so far  Stool negative for acute C diff infection  Continue IV vancomycin and ceftazidime-avibactam   Continue IV fluid hydration  Fevers are slowly improving. Had temp 202.2 4/28 @ 2245.  First dose of ceftazidime-avibactam given 4/29 @ 0035.  T-max since starting new antibiotic was 100.9 @ 4/29 0420.  Last antipyretic given 4/29 @2130.  GI PCR + for salmonella and abx adjusted to vanc, avycaz, and Levaquin 5/2.

## 2024-05-02 NOTE — PLAN OF CARE
Problem: Enteral Nutrition  Goal: Feeding Tolerance  Outcome: Progressing  Intervention: Prevent and Manage Feeding Intolerance  Flowsheets (Taken 5/2/2024 1616)  Nutrition Support Management:   tube feeding continued as ordered   other (see comments) RD changed FWF to 150 mL to meet needs.

## 2024-05-02 NOTE — ASSESSMENT & PLAN NOTE
Stool positive C diff antigen, negative toxins, Negative C. Diff PCR---> not acute C. Diff infection  ID following-- continue oral vancomycin for now  Diarrhea possibly from tube feedings  Start lactobacillus 2 tabs bid  GI PCR was positive for Salmonella and antibiotics adjusted per ID

## 2024-05-02 NOTE — ASSESSMENT & PLAN NOTE
Patient has hypokalemia which is Acute and currently controlled. Most recent potassium levels reviewed-   Lab Results   Component Value Date    K 4.8 05/02/2024   . Will continue potassium replacement per protocol and recheck repeat levels after replacement completed.   -----------------------------------------------------------------------  Potassium currently normal

## 2024-05-02 NOTE — PROGRESS NOTES
[facility] Medicine  Progress Note    Patient Name: Steff Johnson  MRN: [id number]  Patient Class: IP- Inpatient   Admission Date: 4/25/2024  Length of Stay: 7 days  Attending Physician: Amber Fernández MD  Primary Care Provider: Cristina Ren MD        Subjective:     Principal Problem:Severe sepsis        HPI:  65 yo female with PMH of CVA with known baseline confusion and aphasia who was sent to the ER because of black drainage coming from around the PEG tube site and having black vomit.     On presentation to the ER, vitals showed 101/59, HR of 107, RR of 25 and fever of 102.3, spo2 of 95%. Cbc with wbc of 28 and anemia of 8.8. CMP showed acute renal failure with creatinine of of 2.5, anion gap of 19, hyponatermia of 133, potassium of 5.9. lactic acid of 6.1. Pt started to have projectile hematemesis. NGT was placed. Mathur was replaced after which pt developed hematuria. UA was grossly positive. C.diffi antigen is positive. EKG showed sinus tachycardia. CT C/A/P showed scattered nonspecific bronchiolitis in both lungs, with right lower lobes opacities potentially reflecting chronic bronchopneumonia, given similar appearance to CT of 12/11/2023. Pt was given the hyperkalemia procotol, 1.6 L of fluids, vanco/zosyn, and protonix 80 mg iv. She will be admitted to medicine for further management.     Overview/Hospital Course:  Ms. Johnson is a resident of a local nursing home she was admitted for GI hemorrhage, severe sepsis, COURTNEY, UTI, and C. Diff. while here, her labs and vital signs were monitored and she was maintained on telemetry.  He was placed on IV vancomycin, Merrem, and vancomycin per PEG while awaiting culture results.  Infectious Disease was consulted to assist with management.  She has a chronic Mathur catheter, which was replaced, and she initially developed hematuria, likely from the trauma of the Mathur catheter being inserted, which did resolve.  She was placed on IV Protonix and  GI was consulted.  She underwent an EGD which was negative for any bleeding and showed moderate/severe distal esophagitis and non-erosive gastritis.  Her anticoagulants, aspirin and Eliquis, were then resumed.  She initially had an NG tube, which was discontinued at some point.  Her continuous tube feedings via PEG were resume and she has tolerated.  She was placed on IV fluids her COURTNEY and her renal function improved.  Her urine culture started grew out Klebsiella (/MDRO) and Providencia stuartii and the Merrem was discontinued and she was started on ceftazidime-avibactam.  Her blood cultures were also positive with Staphylococcus haemolyticus and she was continued on IV vancomycin.  Her stool studies were negative for acute C diff infection however she continued on vancomycin per PEG empirically due to her risk for developing C diff. Repeat urine and blood cultures were collected.  He continued to have loose stools, which may have been due to the continuous tube feedings, and she was started on acidophilus per PEG.  She has a chronic sacral decubitus and Wound Care was consulted and followed her for her wound management.  Her wound did not appear to be contributory to her sepsis.                Interval History:  Patient seen and examined. Overnight notes reviewed.  WBC increased on am labs. Afebrile. GI PCR was positive for Salmonella and antibiotics adjusted per ID. Repeat blood cultures from 04/30 with NGTD. ID following. Respiratory culture ordered. Called POA to update her on plan of care but no answer.     Review of Systems   Unable to perform ROS: Patient nonverbal     Objective:     Vital Signs (Most Recent):  Temp: 98.1 °F (36.7 °C) (05/02/24 1124)  Pulse: 75 (05/02/24 1247)  Resp: 18 (05/02/24 1247)  BP: (!) 160/78 (05/02/24 1124)  SpO2: 98 % (05/02/24 1247) Vital Signs (24h Range):  Temp:  [97.6 °F (36.4 °C)-99.5 °F (37.5 °C)] 98.1 °F (36.7 °C)  Pulse:  [73-97] 75  Resp:  [15-18] 18  SpO2:  [96 %-100  %] 98 %  BP: (147-191)/(70-84) 160/78     Weight: 53.5 kg (118 lb)  Body mass index is 23.83 kg/m².    Intake/Output Summary (Last 24 hours) at 5/2/2024 1519  Last data filed at 5/2/2024 0634  Gross per 24 hour   Intake 700 ml   Output 1640 ml   Net -940 ml         Physical Exam  Vitals and nursing note reviewed.   Constitutional:       Appearance: Normal appearance. She is normal weight. She is ill-appearing (chronically).   HENT:      Head: Normocephalic and atraumatic.      Mouth/Throat:      Mouth: Mucous membranes are moist.      Pharynx: Oropharynx is clear.   Eyes:      Extraocular Movements: Extraocular movements intact.   Cardiovascular:      Rate and Rhythm: Normal rate and regular rhythm.      Pulses: Normal pulses.      Heart sounds: Normal heart sounds.   Pulmonary:      Effort: Pulmonary effort is normal.      Breath sounds: Normal breath sounds.   Abdominal:      General: Abdomen is flat. Bowel sounds are normal.      Palpations: Abdomen is soft.      Tenderness: There is no abdominal tenderness. There is no guarding or rebound.      Comments: G tube in place.    Genitourinary:     Comments: Mathur catheter in place.   Rectal tube with more formed stool noted.   Neurological:      Mental Status: She is alert. Mental status is at baseline.             Significant Labs: All pertinent labs within the past 24 hours have been reviewed.  CBC:   Recent Labs   Lab 05/01/24  0545 05/01/24 2030 05/02/24  0016 05/02/24  0400 05/02/24  1201   WBC 21.74*  --   --  23.69*  --    HGB 8.5*   < > 8.2* 8.2*  8.3* 7.9*   HCT 26.6*  --   --  25.0*  --    *  --   --  571*  --     < > = values in this interval not displayed.     CMP:   Recent Labs   Lab 05/01/24  0546 05/01/24 2030 05/02/24  0400    139 139   K 4.3 4.4 4.8    107 108   CO2 24 23 24   * 364* 253*   BUN 29* 23 24*   CREATININE 0.7 0.7 0.7   CALCIUM 8.6* 8.4* 8.5*   PROT 6.7  --  6.4   ALBUMIN 2.6*  --  2.5*   BILITOT 0.3  --  0.3  "  ALKPHOS 136*  --  119   AST 22  --  26   ALT 43  --  39   ANIONGAP 8 9 7*       Significant Imaging: I have reviewed all pertinent imaging results/findings within the past 24 hours.    Assessment/Plan:      * Severe sepsis  This patient does have evidence of infective focus  My overall impression is  severe sepsis :  Evidenced by leukocytosis, fever, current infection, and lactic acidosis.  Source: Urinary Tract  Antibiotics given-   Antibiotics (72h ago, onward)      Start     Stop Route Frequency Ordered    05/02/24 1400  levoFLOXacin 750 mg/150 mL IVPB 750 mg         -- IV Every 24 hours (non-standard times) 05/02/24 1250    05/01/24 1800  vancomycin 750 mg in dextrose 5 % 250 mL IVPB (ready to mix)        Note to Pharmacy: Ht: 4' 11" (1.499 m)  Wt: 53.5 kg (118 lb)  Estimated Creatinine Clearance: 47.4 mL/min (based on SCr of 0.9 mg/dL).  Body mass index is 23.83 kg/m².    -- IV Every 24 hours (non-standard times) 04/30/24 1618    04/30/24 1653  vancomycin - pharmacy to dose  (vancomycin IVPB (PEDS and ADULTS))        Placed in "And" Linked Group    -- IV pharmacy to manage frequency 04/30/24 1554    04/29/24 0800  cefTAZidime-avibactam (AVYCAZ) 1.25 g in dextrose 5 % (D5W) 100 mL         -- IV Every 8 hours (non-standard times) 04/29/24 0046    04/25/24 2100  mupirocin 2 % ointment         04/30/24 2059 Nasl 2 times daily 04/25/24 1905          Latest lactate reviewed-  Recent Labs   Lab 05/02/24  0400   LACTATE 1.8     Organ dysfunction indicated by Acute kidney injury-- which has resolved    Fluid challenge Not needed - patient is not hypotensive      Post- resuscitation assessment No - Post resuscitation assessment not needed       Will Not start Pressors- Levophed for MAP of 65  Source control achieved by:  IV antibiotics  ---------------------------------------------------------------  It is noted that patient has history of recurrent episodes of sepsis due to complicated UTI.  Awaiting urine culture " results  Procalcitonin 8.983.  Not sure if this level is improved since an initial level was not obtained when patient was admitted.  Highest Procalcitonin was 133 with patient's last episode of sepsis 12/2/23.  Lactic acidosis resolved  Urine culture positive Klebsiella (/MDRO) and Providencia stuartii/ repeat urine culture negative so far  Blood cultures collected 4/25 (+) Staphylococcus haemolyticus/ repeat blood cultures collected 4/29 negative so far  Stool negative for acute C diff infection  Continue IV vancomycin and ceftazidime-avibactam   Continue IV fluid hydration  Fevers are slowly improving. Had temp 202.2 4/28 @ 2245.  First dose of ceftazidime-avibactam given 4/29 @ 0035.  T-max since starting new antibiotic was 100.9 @ 4/29 0420.  Last antipyretic given 4/29 @2130.  GI PCR + for salmonella and abx adjusted to vanc, avycaz, and Levaquin 5/2.       Bacteremia  Blood cultures collected 4/25 (+) Staphylococcus haemolyticus  Repeat blood cultures collected 4/29  and 4/30 negative so far   Continue IV vancomycin      Encephalopathy chronic        Quadriplegia  Chronic/due to history of CVA  Continue pressure relief measures      History of ischemic stroke  Continue prevention: statin, ASA, Eliquis     Acute kidney injury  Essentially resolved/creatinine normal.    Patient with acute kidney injury/acute renal failure likely due to pre-renal azotemia due to IVVD 2/2 GIB and Sepsis. COURTNEY is currently stable. Baseline creatinine  0.7  - Labs reviewed- Renal function/electrolytes with Estimated Creatinine Clearance: 60.9 mL/min (based on SCr of 0.7 mg/dL). according to latest data. Monitor urine output and serial BMP and adjust therapy as needed. Avoid nephrotoxins and renally dose meds for GFR listed above.      Type 2 diabetes mellitus with neurologic complication, with long-term current use of insulin  Patient's FSGs are uncontrolled due to hyperglycemia on current medication regimen.  Last A1c reviewed-  "  Lab Results   Component Value Date    HGBA1C 7.8 (H) 12/05/2023     Most recent fingerstick glucose reviewed- No results for input(s): "POCTGLUCOSE" in the last 24 hours.  Current correctional scale  Low  Increase anti-hyperglycemic dose as follows-   Antihyperglycemics (From admission, onward)      Start     Stop Route Frequency Ordered    04/30/24 2100  insulin detemir U-100 (Levemir) pen 18 Units         -- SubQ Nightly 04/30/24 1550    04/30/24 1649  insulin aspart U-100 pen 0-10 Units         -- SubQ Every 6 hours PRN 04/30/24 1550          Hold Oral hypoglycemics while patient is in the hospital.  -----------------------------------------------  Increase basal insulin to 18 units nightly and Increase SSI to medium dose      Hypertension  Chronic.    Temp:  [97.6 °F (36.4 °C)-99.5 °F (37.5 °C)]   Pulse:  [73-97]   Resp:  [15-18]   BP: (147-191)/(70-84)   SpO2:  [96 %-100 %] .   Home meds for hypertension were reviewed and noted below.   Hypertension Medications               amLODIPine (NORVASC) 10 MG tablet 1 tablet (10 mg total) by Per G Tube route once daily.    furosemide (LASIX) 40 MG tablet 40 mg by Per G Tube route once daily.    losartan (COZAAR) 100 MG tablet 1 tablet (100 mg total) by Per G Tube route once daily.    metoprolol tartrate (LOPRESSOR) 25 MG tablet 25 mg by Per G Tube route 2 (two) times daily.    cloNIDine (CATAPRES) 0.1 MG tablet 1 tablet (0.1 mg total) by Per G Tube route 3 (three) times daily.          Will order clonidine 0.1 q8h prn only in case pt went into rebound HTN.5/1: pt consistently hypertensive added home norvasc and metoprolol     Clostridium difficile infection  Stool positive C diff antigen, negative toxins, Negative C. Diff PCR---> not acute C. Diff infection  ID following-- continue oral vancomycin for now  Diarrhea possibly from tube feedings  Start lactobacillus 2 tabs bid  GI PCR was positive for Salmonella and antibiotics adjusted per ID    Complicated UTI " (urinary tract infection)  History of chronic Mathur catheter  Catheter changed on 04/25/24   Urine culture collected 4/23 (+) Klebsiella pneumoniae (/MDRO) and Providencia stuartii  Urine culture collected 4/29 negative so far  Continue ceftazidime-avibactam (Started 4/29)  UA collected 4/29 improved when compared to UA collected 4/25: Bact- few (4/29),Many (4/25)/ Color- yellow (was red 4/25)/ Blood- 1+ (was 3+)/ still with pyuria and 3+ Leuk Est/ Remains Nitrite neg  Awaiting urine culture results    Acute blood loss anemia  Patient's anemia is currently controlled. Has received 2 units of PRBCs on 4/24 . Etiology likely d/t acute blood loss which was from GIB  Current CBC reviewed-   Lab Results   Component Value Date    HGB 7.9 (L) 05/02/2024    HCT 25.0 (L) 05/02/2024     Monitor serial CBC and transfuse if patient becomes hemodynamically unstable, symptomatic or H/H drops below 7/21.    Hyperkalemia  Patient has hypokalemia which is Acute and currently controlled. Most recent potassium levels reviewed-   Lab Results   Component Value Date    K 4.8 05/02/2024   . Will continue potassium replacement per protocol and recheck repeat levels after replacement completed.   -----------------------------------------------------------------------  Potassium currently normal        GI hemorrhage  GI consulted: S/P EGD: moderate/severe distal esophagitis, no bleeding.  Aspirin Eliquis has been resumed  Continue IV Protonix b.i.d.    Seizure disorder  Controlled.  Continue chronic antiepileptic.      Sacral decubitus ulcer, stage III  Chronic issue  Wound Care following  Continue current wound care regimen   Continue pressure relief measures      Aphasia  Due to history of CVA        VTE Risk Mitigation (From admission, onward)           Ordered     apixaban tablet 2.5 mg  2 times daily         05/01/24 1524     IP VTE HIGH RISK PATIENT  Once         04/25/24 1903     Place sequential compression device  Until  discontinued         04/25/24 1903                    Discharge Planning   ALEX: 5/6/2024     Code Status: Full Code   Is the patient medically ready for discharge?:     Reason for patient still in hospital (select all that apply): Patient trending condition, Laboratory test, Treatment, Consult recommendations, and Pending disposition  Discharge Plan A: Return to nursing home                  Lizett Scales PA-C  Department of Hospital Medicine   Formerly [facility]

## 2024-05-02 NOTE — CARE UPDATE
05/02/24 0722   Patient Assessment/Suction   Level of Consciousness (AVPU) alert   Respiratory Effort Normal;Unlabored   Expansion/Accessory Muscles/Retractions expansion symmetric;no retractions;no use of accessory muscles   All Lung Fields Breath Sounds Anterior:;Lateral:;diminished;clear   Rhythm/Pattern, Respiratory pattern regular;unlabored   PRE-TX-O2   Device (Oxygen Therapy) nasal cannula   $ Is the patient on Low Flow Oxygen? Yes   Flow (L/min) (Oxygen Therapy) 2   SpO2 97 %   Pulse Oximetry Type Intermittent   $ Pulse Oximetry - Multiple Charge Pulse Oximetry - Multiple   Pulse 85   Resp 16   Aerosol Therapy   $ Aerosol Therapy Charges Aerosol Treatment   Daily Review of Necessity (SVN) completed   Respiratory Treatment Status (SVN) given   Treatment Route (SVN) mask;oxygen   Post Treatment Assessment (SVN) breath sounds improved   Signs of Intolerance (SVN) none   Education   $ Education Bronchodilator;15 min

## 2024-05-02 NOTE — PROGRESS NOTES
[facility]   Department of Infectious Disease  Progress  Note        PATIENT NAME: Steff Johnson  MRN: [id number]  TODAY'S DATE: 05/02/2024  ADMIT DATE: 4/25/2024  LOS: 7 days    CHIEF COMPLAINT: GI Problem (Brown fluid being withdrawn from peg tube, vomiting brown fluid. Smells of feces. Since this morning. Pt is nonverbal)    PRINCIPLE PROBLEM: Severe sepsis    REASON FOR CONSULT: UA and c.diffi     INTERVAL HISTORY     5/2/24@1126 (Denbelinda):  Patient is lying in bed awake and alert though she makes poor eye contact in his nonverbal today.  She continues with a rectal tube but stool seems to be thickening in his not as copious.  She has a very coarse frequent cough but continues on room air.  We have asked for a endotracheal aspirate sputum culture to be done.  Gastrointestinal PCR with salmonella detected.  Discussed with Infectious control practitioner.  4/30 blood cultures no growth to date.  One set of blood cultures from 05/02 no growth to date.  Repeat urine culture on 04/29 no growth to date.  T-max 99.5° in the last 24 hours.  WBC 23.69 trending up, platelets 571 trending up, left shift resolved, 4% bands up from yesterday 1% bands, BUN/CR 24/0.7 with 60.9 estimated creatinine clearance.  BNP 82.  Lactic acid yesterday was    5/1/2024@1228 (Danette):  Patient actually looks a little better today.  When you ask her how she was doing she says all right.  She denies pain.  She still has a rectal tube stool is not as copious and liquid as it was yesterday.  T-max 99.6° in last 24 hour.  KUB from yesterday with unremarkable bowel gas pattern and patchy hazy opacities in the right lung base possibly atelectasis or infiltrate.  WBC 21.74 trending down, platelets 541 trending up again.  Left shift 77%, bands improved at 1%.  CRP 14.40 trending down, procalcitonin 5.069 trending down.  4/30 blood cultures x2 sets no growth to date.  RVP negative.  MRSA screen negative.  4/29 blood and urine cultures  "negative.  Echocardiogram with no abnormalities.    4/30/24@1315 (Denette):  Patient seen alone in her room today.  She opens her eyes but does not pay attention.  She does not appear in any distress in his on room air.  She has a rectal tube with a copious amount of liquid stool.  Last chest x-ray from 04/27 with small right pleural effusion and right basilar airspace disease that appears worse than previous with subsegmental atelectasis of the left lung base.  Last fever on 04/28 was 102.2, T-max in the last 24 hours 100, WBC 23.17 still elevated, platelets 501 still elevated, left shift 77% with 4% bands.  Anemia better after blood transfusion at 9.2/28.6.  BUN/CR 35/0.9, creatinine clearance 47.4, alk-phos elevated 165, albumin 2.7, ALT/AST 63/47.  Procalcitonin 8.983 and CRP greater than 16.  Urine culture from 04/25 with Providencia stuartii and Klebsiella pneumoniae /MDRO; 4/25 blood cultures x2 sets with 1 bottle from each set with aerobic bottle positive for staph haemolyticus (vanc ana 2).    04/27/2024.  Dr. Calvo covering for the weekend.  Patient is nonverbal as usual.  I feel like she understands me when I talk to her.  Talking gently and slowly puts her at ease.  She is afebrile at this time.  T-max yesterday was 101.8.  Much better than T-max on admission of 102.3  Blood cultures are showing Staphylococcus species.  Will order repeat blood cultures   Urine cultures are showing Providencia and a LPGNR in progress.    Stool cultures no growth to date.    Antibiotics (From admission, onward)      Start     Stop Route Frequency Ordered    05/02/24 1400  levoFLOXacin 750 mg/150 mL IVPB 750 mg         -- IV Every 24 hours (non-standard times) 05/02/24 1250    05/01/24 1800  vancomycin 750 mg in dextrose 5 % 250 mL IVPB (ready to mix)        Note to Pharmacy: Ht: 4' 11" (1.499 m)  Wt: 53.5 kg (118 lb)  Estimated Creatinine Clearance: 47.4 mL/min (based on SCr of 0.9 mg/dL).  Body mass index is 23.83 " "kg/m².    -- IV Every 24 hours (non-standard times) 04/30/24 1618    04/30/24 1653  vancomycin - pharmacy to dose  (vancomycin IVPB (PEDS and ADULTS))        Placed in "And" Linked Group    -- IV pharmacy to manage frequency 04/30/24 1554    04/29/24 0800  cefTAZidime-avibactam (AVYCAZ) 1.25 g in dextrose 5 % (D5W) 100 mL         -- IV Every 8 hours (non-standard times) 04/29/24 0046    04/25/24 2100  mupirocin 2 % ointment         04/30/24 2059 Nasl 2 times daily 04/25/24 1905          Antifungals (From admission, onward)      None             ASSESSMENT and PLAN     1. Staphylococcus haemolyticus bacteremia   -in December 2023 patient had Enterococcus faecalis bacteremia, Proteus mirabilis ESBL bacteremia both from urinary source  -4/25 blood cultures x2 sets with aerobic bottle from each set positive for staph haemolyticus - sensitive to Bactrim and vancomycin (LEAH 2)  -4/29 blood culture x1 set no growth to date, pending   -procalcitonin 8.983, CRP>16-->14.40   -4/30 leukocytosis, initially 24.95, peaked at 28.44 on 04/28, 23.17 on 04/30, left shift, 4% band    2. Catheter associated UTI.    -previously grew ESBL Proteus in December 2023  -Catheter changed on 04/25/24.  -4/25 urine culture with Providencia stuartii and Klebsiella pneumoniae /MDRO   -4/29 urine culture no growth to date    3. Diarrhea with Positive C diff antigen with negative PCR and toxin assay - salmonella on GI PCR positive on 05/02  -diarrhea watery, stool cultures negative, C diff antigen positive, toxin and PCR negative    4. Acute kidney injury - improved  -initial BUN/CR 84/2.7; followed by Nephrology  -4/30 BUN/CR 35/0.9 - current creatinine clearance 47.4  -5/1 creatinine has improved and is at baseline 0.7, creatinine clearance 60.9    5. Acute on chronic anemia  -given given 2 units PRBCs, managed by hospitalist    6. Pneumonia versus pulmonary edema  -initial CT chest abdomen pelvis with scattered nonspecific bronchiolitis and " "right lower lobe opacities with chronic bronchopneumonia similar to previous from December 2023  -chest x-rays with minimal improvement  -procalcitonin elevated 8.983-->5.0690    7. Chronic medical problems including CVA, aphasia, dysphagia with PEG tube, chronic indwelling Mathur catheter, seizures        RECOMMENDATIONS:    Continue oral vancomycin 125 mg via PEG tube but decreased to twice daily  Start levofloxacin 750 mg per IV daily for Salmonella and pneumonia  Continue ceftazidime-avibactam 1.25 g every 8 hours over to our infusions for 7 days through May 5th for Providencia stuartii and Klebsiella pneumoniae  and urine and pneumonia  Continue vancomycin with pharmacy to dose - for staph haemolyticus bacteremia  Monitor renal function  Stop Imodium  Monitor stool output  Obtain CRP and procalcitonin in a.m.      D/W Dr Cochran, hospitalist, infection control practitioner    Please send Epic secure chat with any questions.      Antibiotics (From admission, onward)      Start     Stop Route Frequency Ordered    05/02/24 1400  levoFLOXacin 750 mg/150 mL IVPB 750 mg         -- IV Every 24 hours (non-standard times) 05/02/24 1250    05/01/24 1800  vancomycin 750 mg in dextrose 5 % 250 mL IVPB (ready to mix)        Note to Pharmacy: Ht: 4' 11" (1.499 m)  Wt: 53.5 kg (118 lb)  Estimated Creatinine Clearance: 47.4 mL/min (based on SCr of 0.9 mg/dL).  Body mass index is 23.83 kg/m².    -- IV Every 24 hours (non-standard times) 04/30/24 1618    04/30/24 1653  vancomycin - pharmacy to dose  (vancomycin IVPB (PEDS and ADULTS))        Placed in "And" Linked Group    -- IV pharmacy to manage frequency 04/30/24 1554    04/29/24 0800  cefTAZidime-avibactam (AVYCAZ) 1.25 g in dextrose 5 % (D5W) 100 mL         -- IV Every 8 hours (non-standard times) 04/29/24 0046    04/25/24 2100  mupirocin 2 % ointment         04/30/24 2059 Nasl 2 times daily 04/25/24 1905          Antifungals (From admission, onward)      None         "   Antivirals (From admission, onward)      None              Review of patient's allergies indicates:  No Known Allergies        SUBJECTIVE     Review of systems: Unable to obtain      OBJECTIVE   Temp:  [97.6 °F (36.4 °C)-99.5 °F (37.5 °C)] 98.1 °F (36.7 °C)  Pulse:  [73-97] 75  Resp:  [15-18] 18  SpO2:  [96 %-100 %] 98 %  BP: (147-191)/(70-84) 160/78  Temp:  [97.6 °F (36.4 °C)-99.5 °F (37.5 °C)]   Temp: 98.1 °F (36.7 °C) (05/02/24 1124)  Pulse: 75 (05/02/24 1247)  Resp: 18 (05/02/24 1247)  BP: (!) 160/78 (05/02/24 1124)  SpO2: 98 % (05/02/24 1247)    Intake/Output Summary (Last 24 hours) at 5/2/2024 1458  Last data filed at 5/2/2024 0634  Gross per 24 hour   Intake 700 ml   Output 1640 ml   Net -940 ml     Physical Exam  General:  Awake and alert, nonverbal today, does not maintain eye contact.  Eyes: Eyes with no icterus or injection.  No exudates.  Ears:  Hearing seemed grossly intact, she will turn her head to voice but is poorly attentive.  Mouth:  Moist mucous membranes, no thrush noted.  Cardiovascular: Regular rate and rhythm, no murmurs, no significant edema  Respiratory:  Clear upper lobes, lower lobes with crackles, on room air, coarse congested sounding cough.  Gastrointestinal:  Soft and obese with active bowel sounds, no distention.  Peg tube with no redness or drainage at PEG site.  Rectal tube with thickening diarrhea  Genitourinary:  Urinary catheter with clear yellow urine  Musculoskeletal:  No spontaneous movement, heel protectors in place.   Skin: Pale, warm and dry.   Neuro: poorly attentive, does not follow commands, nonverbal  Psych:  Calm, not cooperative but not agitated.  VAD:  PIV  ISOLATION:  Contact isolation    Wounds:   4/30                        Significant Labs: All pertinent labs within the past 24 hours have been reviewed.    CBC LAST 7  Recent Labs   Lab 04/25/24  1701 04/25/24  1854 04/26/24  0405 04/26/24  0608 04/27/24  0615 04/27/24  1121 04/28/24  0455 04/29/24  0628  04/30/24  0649 05/01/24  0545 05/01/24  2030 05/02/24  0016 05/02/24  0400 05/02/24  1201   WBC 24.95*  --  24.03*  --  19.61*  --  28.44* 26.35* 23.17* 21.74*  --   --  23.69*  --    RBC 2.76*  --  3.38*  --  3.18*  --  3.74* 3.13* 3.41* 3.11*  --   --  2.89*  --    HGB 7.3*   < > 9.4*   < > 8.8*  8.8*   < > 10.3* 8.6* 9.2* 8.5* 8.4* 8.2* 8.2*  8.3* 7.9*   HCT 22.3*  --  27.9*  --  25.5*  --  30.6* 26.0* 28.6* 26.6*  --   --  25.0*  --    MCV 81*  --  83  --  80*  --  82 83 84 86  --   --  87  --    MCH 26.4*  --  27.8  --  27.7  --  27.5 27.5 27.0 27.3  --   --  28.4  --    MCHC 32.7  --  33.7  --  34.5  --  33.7 33.1 32.2 32.0  --   --  32.8  --    RDW 18.7*  --  16.6*  --  16.9*  --  17.5* 18.2* 18.6* 18.6*  --   --  18.5*  --    *  --  482*  --  476*  --  498* 498* 501* 541*  --   --  571*  --    MPV 10.7  --  10.1  --  10.4  --  10.6 10.5 11.1 10.7  --   --  10.6  --    GRAN 83.0*  20.7*  --  86.8*  20.9*  --  88.6*  17.4*  --  86.6*  24.6* 80.7*  21.3* 77.0* 77.0*  --   --  72.0  --    LYMPH 9.3*  2.3  --  5.7*  1.4  --  4.4*  0.9*  --  5.0*  1.4 5.9*  1.6 6.0* 9.0*  --   --  16.0*  --    MONO 5.8  1.5*  --  4.7  1.1*  --  4.6  0.9  --  5.7  1.6* 7.9  2.1* 6.0 9.0  --   --  3.0*  --    BASO 0.05  --  0.09  --  0.05  --  0.13 0.10  --   --   --   --   --   --    NRBC 0  --  0  --  0  --  0 0 0 0  --   --  0  --     < > = values in this interval not displayed.       CHEMISTRY LAST 7  Recent Labs   Lab 04/26/24  0405 04/27/24  0615 04/28/24  0455 04/28/24  1333 04/28/24  1758 04/29/24  0628 04/30/24  0649 05/01/24  0546 05/01/24  2030 05/02/24  0400    145 144  --   --  142 140 141 139 139   K 4.3 3.2* 3.4* 4.6  --  4.6 4.7 4.3 4.4 4.8    111* 109  --   --  110 109 109 107 108   CO2 20* 22* 25  --   --  26 24 24 23 24   ANIONGAP 15 12 10  --   --  6* 7* 8 9 7*   BUN 96* 69* 56*  --   --  39* 35* 29* 23 24*   CREATININE 3.1* 1.9* 1.4  --   --  1.0 0.9 0.7 0.7 0.7   * 67*  "148*  --   --  209* 247* 242* 364* 253*   CALCIUM 8.3* 8.6* 9.2  --   --  8.8 8.8 8.6* 8.4* 8.5*   MG 2.0 1.9 1.8  --  1.7 1.6 1.8 1.7  --  1.8   ALBUMIN 2.7* 2.6* 2.9*  --   --  2.7* 2.7* 2.6*  --  2.5*   PROT 6.6 6.5 7.4  --   --  6.8 7.0 6.7  --  6.4   ALKPHOS 68 84 152*  --   --  147* 165* 136*  --  119   ALT 10 16 86*  --   --  60* 63* 43  --  39   AST 19 37 102*  --   --  51* 47* 22  --  26   BILITOT 0.9 0.6 0.6  --   --  0.4 0.3 0.3  --  0.3       Estimated Creatinine Clearance: 60.9 mL/min (based on SCr of 0.7 mg/dL).    INFLAMMATORY/PROCAL  LAST 7  Recent Labs   Lab 04/29/24  0628 04/30/24  0649 05/01/24  0546   PROCAL  --  8.983* 5.069*   CRP >16.00*  --  14.40*     No results found for: "ESR"  CRP   Date Value Ref Range Status   05/01/2024 14.40 (H) <1.00 mg/dL Final     Comment:     CRP-Normal Application expected values:   <1.0        mg/dL   Normal Range  1.0 - 5.0  mg/dL   Indicates mild inflammation  5.0 - 10.0 mg/dL   Indicates severe inflammation  >10.0        mg/dL   Represents serious processes and   frequently         indicates the presence of a bacterial   infection.      04/29/2024 >16.00 (H) <1.00 mg/dL Final     Comment:     CRP-Normal Application expected values:   <1.0        mg/dL   Normal Range  1.0 - 5.0  mg/dL   Indicates mild inflammation  5.0 - 10.0 mg/dL   Indicates severe inflammation  >10.0        mg/dL   Represents serious processes and   frequently         indicates the presence of a bacterial   infection.      12/11/2023 55.1 (H) 0.0 - 8.2 mg/L Final   12/09/2023 143.3 (H) 0.0 - 8.2 mg/L Final   12/06/2023 293.0 (H) 0.0 - 8.2 mg/L Final   10/25/2023 92.6 (H) 0.0 - 8.2 mg/L Final   06/15/2023 0.65 <0.76 mg/dL Final       PRIOR  MICROBIOLOGY:  Reviewed    Susceptibility data from last 90 days.  Collected Specimen Info Organism Amp/Sulbactam Ampicillin Cefepime Ceftriaxone Ciprofloxacin Clindamycin Ertapenem Erythromycin Gentamicin Levofloxacin Meropenem Nitrofurantoin Oxacillin " Penicillin   04/25/24 Urine Providencia stuartii  I   S  S  R   S   R  R  S        KLEBSIELLA PNEUMONIAE   R  R  R  R  R   R   S  I  R  R     04/25/24 Blood Staphylococcus haemolyticus     R   R   R      R  R   04/25/24 Blood Staphylococcus haemolyticus                 02/23/24 Blood from Peripheral, Antecubital, Left No growth after 5 days.                 02/23/24 Blood from Peripheral, Antecubital, Left No growth after 5 days.                 02/23/24 Urine Candida tropicalis                   Collected Specimen Info Organism PIPERACILLIN/TAZO SUSCEPTIBILITY Tetracycline Tobramycin Trimeth/Sulfa Vancomycin   04/25/24 Urine Providencia stuartii  S   R  S      KLEBSIELLA PNEUMONIAE   R  R  I  R    04/25/24 Blood Staphylococcus haemolyticus   R   S  S   04/25/24 Blood Staphylococcus haemolyticus        02/23/24 Blood from Peripheral, Antecubital, Left No growth after 5 days.        02/23/24 Blood from Peripheral, Antecubital, Left No growth after 5 days.        02/23/24 Urine Candida tropicalis              LAST 7 DAYS MICROBIOLOGY   Microbiology Results (last 7 days)       Procedure Component Value Units Date/Time    Culture, Respiratory with Gram Stain [[id number]] Collected: 05/02/24 1240    Order Status: Sent Specimen: Sputum from Endotracheal Aspirate Updated: 05/02/24 1252    Blood culture [[id number]] Collected: 04/29/24 0628    Order Status: Completed Specimen: Blood from Peripheral, Hand, Right Updated: 05/02/24 0832     Blood Culture, Routine No Growth to date      No Growth to date      No Growth to date      No Growth to date    Urine culture [[id number]] Collected: 04/29/24 1058    Order Status: Completed Specimen: Urine Updated: 05/02/24 0721     Urine Culture, Routine No growth    Narrative:      Specimen Source->Urine    Blood culture [[id number]] Collected: 05/02/24 0016    Order Status: Completed Specimen: Blood from Peripheral, Hand, Left Updated: 05/02/24 0717     Blood Culture, Routine No  Growth to date    Narrative:      x2    Blood culture [[id number]] Collected: 04/30/24 1729    Order Status: Completed Specimen: Blood from Peripheral, Hand, Left Updated: 05/01/24 1832     Blood Culture, Routine No Growth to date      No Growth to date    Blood culture [[id number]] Collected: 04/30/24 1729    Order Status: Completed Specimen: Blood from Peripheral, Hand, Right Updated: 05/01/24 1832     Blood Culture, Routine No Growth to date      No Growth to date    MRSA Screen by PCR [[id number]] Collected: 04/30/24 1653    Order Status: Completed Specimen: Nasal Swab Updated: 04/30/24 1959     MRSA SCREEN BY PCR Negative    Respiratory Infection Panel (PCR), Nasopharyngeal [[id number]] Collected: 04/30/24 1728    Order Status: Completed Specimen: Nasopharyngeal Swab Updated: 04/30/24 1844     Respiratory Infection Panel Source NP swab     Adenovirus Not Detected     Coronavirus 229E, Common Cold Virus Not Detected     Coronavirus HKU1, Common Cold Virus Not Detected     Coronavirus NL63, Common Cold Virus Not Detected     Coronavirus OC43, Common Cold Virus Not Detected     Comment: Coronavirus strains 229E, HKU1, NL63, and OC43 can cause the common   cold   and are not associated with the respiratory disease outbreak caused   by  the COVID-19 (SARS-CoV-2 novel Coronavirus) strain.           SARS-CoV2 (COVID-19) Qualitative PCR Not Detected     Human Metapneumovirus Not Detected     Human Rhinovirus/Enterovirus Not Detected     Influenza A (subtypes H1, H1-2009,H3) Not Detected     Influenza B Not Detected     Parainfluenza Virus 1 Not Detected     Parainfluenza Virus 2 Not Detected     Parainfluenza Virus 3 Not Detected     Parainfluenza Virus 4 Not Detected     Respiratory Syncytial Virus Not Detected     Bordetella Parapertussis (AM2156) Not Detected     Bordetella pertussis (ptxP) Not Detected     Chlamydia pneumoniae Not Detected     Mycoplasma pneumoniae Not Detected     Comment: Respiratory Infection  Panel testing performed by Multiplex PCR.       Narrative:      Respiratory Infection Panel source->NP Swab    Blood culture x two cultures. Draw prior to antibiotics. [[id number]]  (Abnormal) Collected: 04/25/24 1223    Order Status: Completed Specimen: Blood Updated: 04/29/24 1124     Blood Culture, Routine Gram stain aer bottle: Gram positive cocci      Results called to and read back by:Ocatvio Rodriguez RN-ED;  04/26/2024      07:56 CJD      STAPHYLOCOCCUS HAEMOLYTICUS  For susceptibility see order #[id number]      Narrative:      Aerobic and anaerobic    Blood culture x two cultures. Draw prior to antibiotics. [[id number]]  (Abnormal)  (Susceptibility) Collected: 04/25/24 1247    Order Status: Completed Specimen: Blood Updated: 04/29/24 1124     Blood Culture, Routine Gram stain aer bottle: Gram positive cocci      Results called to and read back by:Octavio Rodriguez RN-ED;  04/26/2024      07:35 CJD      STAPHYLOCOCCUS HAEMOLYTICUS    Narrative:      Aerobic and anaerobic    Urine culture [[id number]]  (Abnormal)  (Susceptibility) Collected: 04/25/24 1434    Order Status: Completed Specimen: Urine Updated: 04/29/24 1051     Urine Culture, Routine PROVIDENCIA STUARTII  > 100,000 cfu/ml        KLEBSIELLA PNEUMONIAE   > 100,000 cfu/ml  MDRO  Results called to and read back by ZONIA Villasenor;    04/28/2024  12:16 CJD       Comment: Previous value was 2015 verified by I/AUT at 06:27 on 04/28/2024       Narrative:      Specimen Source->Urine    Stool culture **cannot be ordered stat** [[id number]] Collected: 04/25/24 1526    Order Status: Completed Specimen: Stool Updated: 04/27/24 1341     Stool Culture No Salmonella,Shigella,Vibrio,Campylobacter.      No E coli 0157:H7 isolated.    Rapid Organism ID by PCR (from Blood culture) [[id number]]  (Abnormal) Collected: 04/25/24 1247    Order Status: Completed Updated: 04/26/24 0826     Enterococcus faecalis Not Detected     Enterococcus faecium Not Detected      Listeria monocytogenes Not Detected     Staphylococcus spp. Detected     Staphylococcus aureus Not Detected     Staphylococcus epidermidis Not Detected     Staphylococcus lugdunensis Not Detected     Streptococcus species Not Detected     Streptococcus agalactiae Not Detected     Streptococcus pneumoniae Not Detected     Streptococcus pyogenes Not Detected     Acinetobacter calcoaceticus/baumannii complex Not Detected     Bacteroides fragilis Not Detected     Enterobacterales Not Detected     Enterobacter cloacae complex Not Detected     Escherichia coli Not Detected     Klebsiella aerogenes Not Detected     Klebsiella oxytoca Not Detected     Klebsiella pneumoniae group Not Detected     Proteus Not Detected     Salmonella sp Not Detected     Serratia marcescens Not Detected     Haemophilus influenzae Not Detected     Neisseria meningtidis Not Detected     Pseudomonas aeruginosa Not Detected     Stenotrophomonas maltophilia Not Detected     Candida albicans Not Detected     Candida auris Not Detected     Candida glabrata Not Detected     Candida krusei Not Detected     Candida parapsilosis Not Detected     Candida tropicalis Not Detected     Cryptococcus neoformans/gattii Not Detected     CTX-M (ESBL ) Test not applicable     IMP (Carbapenem resistant) Test not applicable     KPC resistance gene (Carbapenem resistant) Test not applicable     mcr-1  Test not applicable     mec A/C  Test not applicable     mec A/C and MREJ (MRSA) gene Test not applicable     NDM (Carbapenem resistant) Test not applicable     OXA-48-like (Carbapenem resistant) Test not applicable     van A/B (VRE gene) Test not applicable     VIM (Carbapenem resistant) Test not applicable    Narrative:      Aerobic and anaerobic    C Diff Toxin by PCR [[id number]] Collected: 04/25/24 1526    Order Status: Completed Updated: 04/25/24 2104     C. diff PCR Negative    Clostridium difficile EIA [[id number]]  (Abnormal) Collected: 04/25/24 1526     Order Status: Completed Specimen: Stool Updated: 04/25/24 1624     C. diff Antigen Positive     C difficile Toxins A+B, EIA Negative     Comment: Testing not recommended for children <24 months old.             CURRENT/PREVIOUS VISIT EKG  Results for orders placed or performed during the hospital encounter of 04/25/24   EKG 12-lead    Collection Time: 04/30/24  2:38 PM   Result Value Ref Range    QRS Duration 62 ms    [facility] QTC Calculation 442 ms    Narrative    Test Reason : R07.9,    Vent. Rate : 079 BPM     Atrial Rate : 079 BPM     P-R Int : 140 ms          QRS Dur : 062 ms      QT Int : 386 ms       P-R-T Axes : 043 013 042 degrees     QTc Int : 442 ms    Normal sinus rhythm  Normal ECG  When compared with ECG of 25-APR-2024 12:25,  No significant change was found    Referred By: [email or web address]   SELF           Confirmed By:        Echocardiography Findings 4/30/24    Left Ventricle The left ventricle is normal in size. Normal wall thickness. Normal wall motion. There is normal systolic function with a visually estimated ejection fraction of 65 - 70%. There is normal diastolic function.   Right Ventricle Normal right ventricular cavity size. Wall thickness is normal. Right ventricle wall motion  is normal. Systolic function is normal.   Left Atrium Normal left atrial size.   Right Atrium Normal right atrial size.   Aortic Valve The aortic valve is structurally normal. There is normal leaflet mobility. Aortic valve peak velocity is 1.40 m/s. Mean gradient is 4 mmHg.   Mitral Valve The mitral valve is structurally normal. There is normal leaflet mobility. The mean pressure gradient across the mitral valve is 2 mmHg at a heart rate of  bpm. There is trace regurgitation.   Tricuspid Valve The tricuspid valve is structurally normal. There is normal leaflet mobility. There is trace regurgitation.   Pulmonic Valve The pulmonic valve is structurally normal. There is normal leaflet mobility.   IVC/SVC Normal venous pressure at  3 mmHg.   Ascending Aorta Aortic root is normal in size. Ascending aorta is normal measuring 2.60 cm.   Pericardium and Other Findings There is a trivial effusion. No indication of cardiac tamponade. Evidence includes no chamber collapse.   LVAD RV/LV ratio is 0.50.       Significant Imaging: I have reviewed all relevant and available imaging results/findings within the past 24 hours.    X-Ray Chest AP Portable 04/25/24 1323    Mild faint nonspecific infrahilar interstitial opacity, new from the previous exam suggesting very mild aspiration/bronchitis/pneumonia or trace edema in the appropriate clinical setting.    X-Ray Chest AP Portable 04/25/24 1504     Interval insertion of a nasogastric tube as described.    CT Chest Abdomen Pelvis Without Contrast  04/25/24 1610   1. Scattered nonspecific bronchiolitis in both lungs, with right lower lobes opacities potentially reflecting chronic bronchopneumonia, given similar appearance to CT of 12/11/2023.   2. Additional stable observations as described.    X-Ray Chest AP Portable 04/25/24 1815   No significant interval detrimental change in the radiographic appearance of the chest as compared with the prior exam on the same date, 04/25/2024.  Nasogastric tube in place.    X-Ray Chest AP Portable 04/26/24 1128    Mild hazy opacification of the right lung base with blunting of the costophrenic angle.  This could reflect atelectasis, infiltrate, pleural effusion or combination there of.    X-Ray Chest AP Portable 04/27/24 1059   Small right pleural effusion and right basilar airspace disease, worsened compared to prior.   Subsegmental atelectasis left lung base.   Interval removal of enteric tube.    X-Ray Abdomen AP 1 View04/30/24 1605   1. Unremarkable bowel gas pattern.   2. Percutaneous gastrostomy tube in the epigastric region.   3. Patchy hazy opacities of the right lung base could reflect atelectasis or infiltrate.      Roberta Samaniego NP  Date of Service:  05/02/2024      This note was created using wildcraft voice recognition software that occasionally misinterpreted phrases or words.

## 2024-05-02 NOTE — ASSESSMENT & PLAN NOTE
Chronic.    Temp:  [97.6 °F (36.4 °C)-99.5 °F (37.5 °C)]   Pulse:  [73-97]   Resp:  [15-18]   BP: (147-191)/(70-84)   SpO2:  [96 %-100 %] .   Home meds for hypertension were reviewed and noted below.   Hypertension Medications               amLODIPine (NORVASC) 10 MG tablet 1 tablet (10 mg total) by Per G Tube route once daily.    furosemide (LASIX) 40 MG tablet 40 mg by Per G Tube route once daily.    losartan (COZAAR) 100 MG tablet 1 tablet (100 mg total) by Per G Tube route once daily.    metoprolol tartrate (LOPRESSOR) 25 MG tablet 25 mg by Per G Tube route 2 (two) times daily.    cloNIDine (CATAPRES) 0.1 MG tablet 1 tablet (0.1 mg total) by Per G Tube route 3 (three) times daily.          Will order clonidine 0.1 q8h prn only in case pt went into rebound HTN.5/1: pt consistently hypertensive added home norvasc and metoprolol

## 2024-05-02 NOTE — ASSESSMENT & PLAN NOTE
Patient's anemia is currently controlled. Has received 2 units of PRBCs on 4/24 . Etiology likely d/t acute blood loss which was from GIB  Current CBC reviewed-   Lab Results   Component Value Date    HGB 7.9 (L) 05/02/2024    HCT 25.0 (L) 05/02/2024     Monitor serial CBC and transfuse if patient becomes hemodynamically unstable, symptomatic or H/H drops below 7/21.

## 2024-05-02 NOTE — SUBJECTIVE & OBJECTIVE
Interval History:  Patient seen and examined. Overnight notes reviewed.  WBC increased on am labs. Afebrile. GI PCR was positive for Salmonella and antibiotics adjusted per ID. Repeat blood cultures from 04/30 with NGTD. ID following. Respiratory culture ordered. Called POA to update her on plan of care but no answer.     Review of Systems   Unable to perform ROS: Patient nonverbal     Objective:     Vital Signs (Most Recent):  Temp: 98.1 °F (36.7 °C) (05/02/24 1124)  Pulse: 75 (05/02/24 1247)  Resp: 18 (05/02/24 1247)  BP: (!) 160/78 (05/02/24 1124)  SpO2: 98 % (05/02/24 1247) Vital Signs (24h Range):  Temp:  [97.6 °F (36.4 °C)-99.5 °F (37.5 °C)] 98.1 °F (36.7 °C)  Pulse:  [73-97] 75  Resp:  [15-18] 18  SpO2:  [96 %-100 %] 98 %  BP: (147-191)/(70-84) 160/78     Weight: 53.5 kg (118 lb)  Body mass index is 23.83 kg/m².    Intake/Output Summary (Last 24 hours) at 5/2/2024 1519  Last data filed at 5/2/2024 0634  Gross per 24 hour   Intake 700 ml   Output 1640 ml   Net -940 ml         Physical Exam  Vitals and nursing note reviewed.   Constitutional:       Appearance: Normal appearance. She is normal weight. She is ill-appearing (chronically).   HENT:      Head: Normocephalic and atraumatic.      Mouth/Throat:      Mouth: Mucous membranes are moist.      Pharynx: Oropharynx is clear.   Eyes:      Extraocular Movements: Extraocular movements intact.   Cardiovascular:      Rate and Rhythm: Normal rate and regular rhythm.      Pulses: Normal pulses.      Heart sounds: Normal heart sounds.   Pulmonary:      Effort: Pulmonary effort is normal.      Breath sounds: Normal breath sounds.   Abdominal:      General: Abdomen is flat. Bowel sounds are normal.      Palpations: Abdomen is soft.      Tenderness: There is no abdominal tenderness. There is no guarding or rebound.      Comments: G tube in place.    Genitourinary:     Comments: Mathur catheter in place.   Rectal tube with more formed stool noted.   Neurological:       Mental Status: She is alert. Mental status is at baseline.             Significant Labs: All pertinent labs within the past 24 hours have been reviewed.  CBC:   Recent Labs   Lab 05/01/24  0545 05/01/24 2030 05/02/24  0016 05/02/24  0400 05/02/24  1201   WBC 21.74*  --   --  23.69*  --    HGB 8.5*   < > 8.2* 8.2*  8.3* 7.9*   HCT 26.6*  --   --  25.0*  --    *  --   --  571*  --     < > = values in this interval not displayed.     CMP:   Recent Labs   Lab 05/01/24  0546 05/01/24 2030 05/02/24  0400    139 139   K 4.3 4.4 4.8    107 108   CO2 24 23 24   * 364* 253*   BUN 29* 23 24*   CREATININE 0.7 0.7 0.7   CALCIUM 8.6* 8.4* 8.5*   PROT 6.7  --  6.4   ALBUMIN 2.6*  --  2.5*   BILITOT 0.3  --  0.3   ALKPHOS 136*  --  119   AST 22  --  26   ALT 43  --  39   ANIONGAP 8 9 7*       Significant Imaging: I have reviewed all pertinent imaging results/findings within the past 24 hours.

## 2024-05-03 LAB
ALBUMIN SERPL BCP-MCNC: 2.5 G/DL (ref 3.5–5.2)
ALP SERPL-CCNC: 132 U/L (ref 55–135)
ALT SERPL W/O P-5'-P-CCNC: 37 U/L (ref 10–44)
ANION GAP SERPL CALC-SCNC: 7 MMOL/L (ref 8–16)
ANISOCYTOSIS BLD QL SMEAR: SLIGHT
AST SERPL-CCNC: 25 U/L (ref 10–40)
BASOPHILS # BLD AUTO: ABNORMAL K/UL (ref 0–0.2)
BASOPHILS NFR BLD: 0 % (ref 0–1.9)
BILIRUB SERPL-MCNC: 0.3 MG/DL (ref 0.1–1)
BUN SERPL-MCNC: 23 MG/DL (ref 8–23)
CALCIUM SERPL-MCNC: 8.5 MG/DL (ref 8.7–10.5)
CHLORIDE SERPL-SCNC: 102 MMOL/L (ref 95–110)
CO2 SERPL-SCNC: 25 MMOL/L (ref 23–29)
CREAT SERPL-MCNC: 0.7 MG/DL (ref 0.5–1.4)
CRP SERPL-MCNC: 8 MG/DL
DIFFERENTIAL METHOD BLD: ABNORMAL
EOSINOPHIL # BLD AUTO: ABNORMAL K/UL (ref 0–0.5)
EOSINOPHIL NFR BLD: 4 % (ref 0–8)
ERYTHROCYTE [DISTWIDTH] IN BLOOD BY AUTOMATED COUNT: 18.2 % (ref 11.5–14.5)
EST. GFR  (NO RACE VARIABLE): >60 ML/MIN/1.73 M^2
GLUCOSE SERPL-MCNC: 236 MG/DL (ref 70–110)
GLUCOSE SERPL-MCNC: 244 MG/DL (ref 70–110)
GLUCOSE SERPL-MCNC: 254 MG/DL (ref 70–110)
GLUCOSE SERPL-MCNC: 259 MG/DL (ref 70–110)
HCT VFR BLD AUTO: 26.3 % (ref 37–48.5)
HGB BLD-MCNC: 8.6 G/DL (ref 12–16)
HYPOCHROMIA BLD QL SMEAR: ABNORMAL
IMM GRANULOCYTES # BLD AUTO: ABNORMAL K/UL (ref 0–0.04)
IMM GRANULOCYTES NFR BLD AUTO: ABNORMAL % (ref 0–0.5)
LYMPHOCYTES # BLD AUTO: ABNORMAL K/UL (ref 1–4.8)
LYMPHOCYTES NFR BLD: 8 % (ref 18–48)
MAGNESIUM SERPL-MCNC: 1.7 MG/DL (ref 1.6–2.6)
MCH RBC QN AUTO: 27.7 PG (ref 27–31)
MCHC RBC AUTO-ENTMCNC: 32.7 G/DL (ref 32–36)
MCV RBC AUTO: 85 FL (ref 82–98)
MONOCYTES # BLD AUTO: ABNORMAL K/UL (ref 0.3–1)
MONOCYTES NFR BLD: 4 % (ref 4–15)
MYELOCYTES NFR BLD MANUAL: 2 %
NEUTROPHILS NFR BLD: 81 % (ref 38–73)
NEUTS BAND NFR BLD MANUAL: 1 %
NRBC BLD-RTO: 0 /100 WBC
PLATELET # BLD AUTO: 619 K/UL (ref 150–450)
PMV BLD AUTO: 10.4 FL (ref 9.2–12.9)
POTASSIUM SERPL-SCNC: 4.8 MMOL/L (ref 3.5–5.1)
PROCALCITONIN SERPL IA-MCNC: 1.69 NG/ML (ref 0–0.5)
PROT SERPL-MCNC: 6.5 G/DL (ref 6–8.4)
RBC # BLD AUTO: 3.1 M/UL (ref 4–5.4)
SODIUM SERPL-SCNC: 134 MMOL/L (ref 136–145)
TARGETS BLD QL SMEAR: ABNORMAL
VANCOMYCIN TROUGH SERPL-MCNC: 12 UG/ML
WBC # BLD AUTO: 25.39 K/UL (ref 3.9–12.7)

## 2024-05-03 PROCEDURE — 25000003 PHARM REV CODE 250: Performed by: STUDENT IN AN ORGANIZED HEALTH CARE EDUCATION/TRAINING PROGRAM

## 2024-05-03 PROCEDURE — 25000003 PHARM REV CODE 250: Performed by: HOSPITALIST

## 2024-05-03 PROCEDURE — 27000221 HC OXYGEN, UP TO 24 HOURS

## 2024-05-03 PROCEDURE — 25000242 PHARM REV CODE 250 ALT 637 W/ HCPCS: Performed by: HOSPITALIST

## 2024-05-03 PROCEDURE — 25000003 PHARM REV CODE 250: Performed by: NURSE PRACTITIONER

## 2024-05-03 PROCEDURE — 94640 AIRWAY INHALATION TREATMENT: CPT

## 2024-05-03 PROCEDURE — 86140 C-REACTIVE PROTEIN: CPT | Performed by: NURSE PRACTITIONER

## 2024-05-03 PROCEDURE — 94761 N-INVAS EAR/PLS OXIMETRY MLT: CPT

## 2024-05-03 PROCEDURE — 63600175 PHARM REV CODE 636 W HCPCS: Mod: JZ,JG | Performed by: NURSE PRACTITIONER

## 2024-05-03 PROCEDURE — 25000003 PHARM REV CODE 250: Performed by: INTERNAL MEDICINE

## 2024-05-03 PROCEDURE — 80202 ASSAY OF VANCOMYCIN: CPT | Performed by: STUDENT IN AN ORGANIZED HEALTH CARE EDUCATION/TRAINING PROGRAM

## 2024-05-03 PROCEDURE — 36415 COLL VENOUS BLD VENIPUNCTURE: CPT | Performed by: STUDENT IN AN ORGANIZED HEALTH CARE EDUCATION/TRAINING PROGRAM

## 2024-05-03 PROCEDURE — 63600175 PHARM REV CODE 636 W HCPCS: Performed by: HOSPITALIST

## 2024-05-03 PROCEDURE — 63600175 PHARM REV CODE 636 W HCPCS: Performed by: STUDENT IN AN ORGANIZED HEALTH CARE EDUCATION/TRAINING PROGRAM

## 2024-05-03 PROCEDURE — 63600175 PHARM REV CODE 636 W HCPCS

## 2024-05-03 PROCEDURE — 80053 COMPREHEN METABOLIC PANEL: CPT | Performed by: HOSPITALIST

## 2024-05-03 PROCEDURE — 63600175 PHARM REV CODE 636 W HCPCS: Performed by: INTERNAL MEDICINE

## 2024-05-03 PROCEDURE — 85007 BL SMEAR W/DIFF WBC COUNT: CPT | Performed by: HOSPITALIST

## 2024-05-03 PROCEDURE — 84145 PROCALCITONIN (PCT): CPT | Performed by: NURSE PRACTITIONER

## 2024-05-03 PROCEDURE — 63600175 PHARM REV CODE 636 W HCPCS: Performed by: NURSE PRACTITIONER

## 2024-05-03 PROCEDURE — 83735 ASSAY OF MAGNESIUM: CPT | Performed by: HOSPITALIST

## 2024-05-03 PROCEDURE — 36415 COLL VENOUS BLD VENIPUNCTURE: CPT | Performed by: NURSE PRACTITIONER

## 2024-05-03 PROCEDURE — C9113 INJ PANTOPRAZOLE SODIUM, VIA: HCPCS | Performed by: INTERNAL MEDICINE

## 2024-05-03 PROCEDURE — 12000002 HC ACUTE/MED SURGE SEMI-PRIVATE ROOM

## 2024-05-03 PROCEDURE — 99900031 HC PATIENT EDUCATION (STAT)

## 2024-05-03 PROCEDURE — 85027 COMPLETE CBC AUTOMATED: CPT | Performed by: HOSPITALIST

## 2024-05-03 PROCEDURE — 25000003 PHARM REV CODE 250

## 2024-05-03 PROCEDURE — 94760 N-INVAS EAR/PLS OXIMETRY 1: CPT

## 2024-05-03 RX ORDER — HYDRALAZINE HYDROCHLORIDE 20 MG/ML
10 INJECTION INTRAMUSCULAR; INTRAVENOUS EVERY 6 HOURS PRN
Status: DISCONTINUED | OUTPATIENT
Start: 2024-05-03 | End: 2024-05-08 | Stop reason: HOSPADM

## 2024-05-03 RX ORDER — METOCLOPRAMIDE HYDROCHLORIDE 5 MG/ML
5 INJECTION INTRAMUSCULAR; INTRAVENOUS EVERY 6 HOURS
Status: COMPLETED | OUTPATIENT
Start: 2024-05-03 | End: 2024-05-04

## 2024-05-03 RX ORDER — VANCOMYCIN HCL IN 5 % DEXTROSE 1G/250ML
1000 PLASTIC BAG, INJECTION (ML) INTRAVENOUS
Status: DISCONTINUED | OUTPATIENT
Start: 2024-05-03 | End: 2024-05-05

## 2024-05-03 RX ADMIN — INSULIN ASPART 6 UNITS: 100 INJECTION, SOLUTION INTRAVENOUS; SUBCUTANEOUS at 09:05

## 2024-05-03 RX ADMIN — LEVETIRACETAM 500 MG: 5 INJECTION INTRAVENOUS at 09:05

## 2024-05-03 RX ADMIN — LEVETIRACETAM 500 MG: 5 INJECTION INTRAVENOUS at 10:05

## 2024-05-03 RX ADMIN — HYDRALAZINE HYDROCHLORIDE 10 MG: 20 INJECTION INTRAMUSCULAR; INTRAVENOUS at 05:05

## 2024-05-03 RX ADMIN — SCOPALAMINE 1 PATCH: 1 PATCH, EXTENDED RELEASE TRANSDERMAL at 10:05

## 2024-05-03 RX ADMIN — FOLIC ACID 1000 MCG: 1 TABLET ORAL at 07:05

## 2024-05-03 RX ADMIN — ASPIRIN 81 MG 81 MG: 81 TABLET ORAL at 09:05

## 2024-05-03 RX ADMIN — IPRATROPIUM BROMIDE AND ALBUTEROL SULFATE 3 ML: 2.5; .5 SOLUTION RESPIRATORY (INHALATION) at 07:05

## 2024-05-03 RX ADMIN — HYDROCODONE BITARTRATE AND ACETAMINOPHEN 1 TABLET: 5; 325 TABLET ORAL at 09:05

## 2024-05-03 RX ADMIN — AMLODIPINE BESYLATE 10 MG: 5 TABLET ORAL at 09:05

## 2024-05-03 RX ADMIN — METOPROLOL TARTRATE 25 MG: 25 TABLET, FILM COATED ORAL at 09:05

## 2024-05-03 RX ADMIN — CEFTAZIDIME, AVIBACTAM 1.25 G: 2; .5 POWDER, FOR SOLUTION INTRAVENOUS at 09:05

## 2024-05-03 RX ADMIN — APIXABAN 2.5 MG: 2.5 TABLET, FILM COATED ORAL at 09:05

## 2024-05-03 RX ADMIN — CEFTAZIDIME, AVIBACTAM 1.25 G: 2; .5 POWDER, FOR SOLUTION INTRAVENOUS at 05:05

## 2024-05-03 RX ADMIN — METOCLOPRAMIDE 5 MG: 5 INJECTION, SOLUTION INTRAMUSCULAR; INTRAVENOUS at 11:05

## 2024-05-03 RX ADMIN — THIAMINE HCL TAB 100 MG 100 MG: 100 TAB at 09:05

## 2024-05-03 RX ADMIN — ACETAMINOPHEN 650 MG: 325 TABLET ORAL at 09:05

## 2024-05-03 RX ADMIN — IPRATROPIUM BROMIDE AND ALBUTEROL SULFATE 3 ML: 2.5; .5 SOLUTION RESPIRATORY (INHALATION) at 08:05

## 2024-05-03 RX ADMIN — METOCLOPRAMIDE 5 MG: 5 INJECTION, SOLUTION INTRAMUSCULAR; INTRAVENOUS at 05:05

## 2024-05-03 RX ADMIN — INSULIN ASPART 4 UNITS: 100 INJECTION, SOLUTION INTRAVENOUS; SUBCUTANEOUS at 09:05

## 2024-05-03 RX ADMIN — VANCOMYCIN HYDROCHLORIDE 1000 MG: 1 INJECTION, POWDER, LYOPHILIZED, FOR SOLUTION INTRAVENOUS at 07:05

## 2024-05-03 RX ADMIN — PANTOPRAZOLE SODIUM 40 MG: 40 INJECTION, POWDER, FOR SOLUTION INTRAVENOUS at 09:05

## 2024-05-03 RX ADMIN — IPRATROPIUM BROMIDE AND ALBUTEROL SULFATE 3 ML: 2.5; .5 SOLUTION RESPIRATORY (INHALATION) at 01:05

## 2024-05-03 RX ADMIN — ATORVASTATIN CALCIUM 80 MG: 40 TABLET, FILM COATED ORAL at 09:05

## 2024-05-03 RX ADMIN — INSULIN ASPART 6 UNITS: 100 INJECTION, SOLUTION INTRAVENOUS; SUBCUTANEOUS at 12:05

## 2024-05-03 RX ADMIN — ALLOPURINOL 100 MG: 100 TABLET ORAL at 09:05

## 2024-05-03 RX ADMIN — CEFTAZIDIME, AVIBACTAM 1.25 G: 2; .5 POWDER, FOR SOLUTION INTRAVENOUS at 01:05

## 2024-05-03 RX ADMIN — LEVOFLOXACIN 750 MG: 5 INJECTION, SOLUTION INTRAVENOUS at 02:05

## 2024-05-03 RX ADMIN — LATANOPROST 1 DROP: 50 SOLUTION/ DROPS OPHTHALMIC at 09:05

## 2024-05-03 NOTE — PROGRESS NOTES
"Formerly [facility], [facility]  Adult Nutrition   Progress Note (Follow-Up)    SUMMARY     Recommendations  Recommendation/Intervention: 1. Continue Glucerna 1.5 @ 40 mL/hr goal rate. Provides 1440 kcal, 79 gm protein, and 729 mL free water. 2. Recommend changing FWF to 150 mL every 4 hours (900 mL) to meet fluid needs (1629 mL). Total current free water 1929 mL exceeds needs with BUN trending to target range.  Goals: 1. Patient will continue to tolerate enteral feeding by follow up. 2. Labs trend to target range by follow up.  Nutrition Goal Status: new  Communication of RD Recs: reviewed with physician    Nutrition Diagnosis PES Statement: Increased nutrition needs related to wound healing as evidenced by stage 3 sacral wound, lower leg stage 2 wound on enteral feeding.     Dietitian Rounds Brief  Patient tolerates TF at goal rate per nurse. Sleeping at visit with no family/caregiver at bedside.Last BM 5/01/24. Rd to monitor tolerance, labs, and status change.    Nutrition Related Social Determinants of Health: SDOH: Adequate food in home environment      Diet order:   Current Diet Order: NPO      Current Nutrition Support Formula Ordered: Glucerna 1.5  Current Nutrition Support Rate Ordered: 40 (ml)  Current Nutrition Support Frequency Ordered: continuous    Evaluation of Received Nutrient/Fluid Intake  Enteral Calories (kcal): 1440  Enteral Protein (gm): 79  Enteral (Free Water) Fluid (mL): 729  Free Water Flush Fluid (mL): 1200  Energy Calories Required: meeting needs  Protein Required: meeting needs  Fluid Required: exceeds needs  Tolerance: tolerating     % Intake of Estimated Energy Needs: 75 - 100 %  % Meal Intake: 75 - 100 %      Intake/Output Summary (Last 24 hours) at 5/3/2024 1537  Last data filed at 5/3/2024 1212  Gross per 24 hour   Intake 200 ml   Output 1750 ml   Net -1550 ml        Anthropometrics  Temp: 98 °F (36.7 °C)  Height Method: Stated  Height: 4' 11" (149.9 cm)  Height (inches): 59 in  Weight " Method: Bed Scale  Weight: 53.5 kg (118 lb)  Weight (lb): 118 lb  Ideal Body Weight (IBW), Female: 95 lb  % Ideal Body Weight, Female (lb): 124.21 %  BMI (Calculated): 23.8  BMI Grade: 18.5-24.9 - normal       Estimated/Assessed Needs  Weight Used For Calorie Calculations: 53.5 kg (117 lb 15.1 oz)  Energy Calorie Requirements (kcal): 5497-0414 / day (25-30 kcal/kg  Energy Need Method: Kcal/kg  Protein Requirements: 64-80 gm/day (1.2-1.5 gm/kg)  Weight Used For Protein Calculations: 53.5 kg (117 lb 15.1 oz)     Estimated Fluid Requirement Method: RDA Method  RDA Method (mL): 1337  CHO Requirement: 167-200 gm/day    Reason for Assessment  Reason For Assessment: RD follow-up  Diagnosis: gastrointestinal disease  Relevant Medical History: CVA, PEG feedings  Interdisciplinary Rounds: did not attend  General Information Comments: CVA with known baseline confusion and aphasia who was sent to the ER because of black drainage coming from around the PEG tube site and having black vomit.  Nutrition Discharge Planning: Glucerna 1.5 @ 40 mL/hr. 150 mL FWF every 4 hours    Nutrition/Diet History  Spiritual, Cultural Beliefs, Synagogue Practices, Values that Affect Care: no  Food Allergies: NKFA  Factors Affecting Nutritional Intake: NPO  Nutrition Support Formula Prior to Admit: Glucerna 1.5  Nutrition Support Rate Prior to Admit:  (unsure)    Nutrition Risk Screen  Nutrition Risk Screen: no indicators present       Wound 04/26/24 1100 Right Foot-Wound Image: Images linked       Wound 04/26/24 1100 Left Calf-Wound Image: Images linked  MST Score: 2  Have you recently lost weight without trying?: Unsure  Weight loss score: 2  Have you been eating poorly because of a decreased appetite?: No  Appetite score: 0       Weight History:  Wt Readings from Last 10 Encounters:   04/26/24 53.5 kg (118 lb)   05/01/24 53.5 kg (117 lb 15.1 oz)   02/23/24 54.2 kg (119 lb 7.8 oz)   02/14/24 61.7 kg (136 lb)   01/09/24 61.7 kg (136 lb)   12/06/23  61.8 kg (136 lb 4.3 oz)   12/12/23 61.7 kg (136 lb)   12/11/23 61.8 kg (136 lb 3.9 oz)   11/21/23 56.2 kg (124 lb)   10/24/23 56.5 kg (124 lb 9 oz)        Lab/Procedures/Meds: Pertinent Labs/Meds Reviewed    Medications:Pertinent Medications Reviewed  Scheduled Meds:  Current Facility-Administered Medications   Medication Dose Route Frequency    albuterol-ipratropium  3 mL Nebulization Q6H WAKE    allopurinoL  100 mg Per G Tube Daily    amLODIPine  10 mg Per G Tube Daily    apixaban  2.5 mg Per G Tube BID    aspirin  81 mg Per G Tube Daily    atorvastatin  80 mg Per G Tube QHS    ceftazidime-avibactam (AVYCAZ) IVPB  1.25 g Intravenous Q8H    folic acid  1,000 mcg Per G Tube QAM    insulin detemir U-100  18 Units Subcutaneous QHS    latanoprost  1 drop Both Eyes QHS    levETIRAcetam (Keppra) IV (PEDS and ADULTS)  500 mg Intravenous Q12H    levoFLOXacin  750 mg Intravenous Q24H    metoprolol tartrate  25 mg Per G Tube BID    pantoprazole  40 mg Intravenous BID    scopolamine  1 patch Transdermal Q3 Days    thiamine  100 mg Per G Tube Daily    vancomycin (VANCOCIN) IV (PEDS and ADULTS)  750 mg Intravenous Q24H     Continuous Infusions:  Current Facility-Administered Medications   Medication Dose Route Frequency Last Rate Last Admin     PRN Meds:.  Current Facility-Administered Medications:     0.9%  NaCl infusion (for blood administration), , Intravenous, Q24H PRN    acetaminophen, 650 mg, Oral, Q4H PRN    aluminum-magnesium hydroxide-simethicone, 30 mL, Oral, QID PRN    [COMPLETED] calcium gluconate IVPB, 1 g, Intravenous, ED 1 Time **AND** calcium gluconate IVPB, 1 g, Intravenous, Q10 Min PRN    cloNIDine, 0.1 mg, Oral, Q8H PRN    dextrose 50%, 12.5 g, Intravenous, PRN    dextrose 50%, 25 g, Intravenous, PRN    glucagon (human recombinant), 1 mg, Intramuscular, PRN    glucose, 16 g, Oral, PRN    glucose, 24 g, Oral, PRN    hydrALAZINE, 10 mg, Intravenous, Q6H PRN    HYDROcodone-acetaminophen, 1 tablet, Oral, Q6H  "PRN    insulin aspart U-100, 0-10 Units, Subcutaneous, Q6H PRN    magnesium oxide, 800 mg, Oral, PRN    magnesium oxide, 800 mg, Oral, PRN    melatonin, 6 mg, Oral, Nightly PRN    naloxone, 0.02 mg, Intravenous, PRN    ondansetron, 4 mg, Intravenous, Q6H PRN    potassium bicarbonate, 35 mEq, Oral, PRN    potassium bicarbonate, 50 mEq, Oral, PRN    potassium bicarbonate, 60 mEq, Oral, PRN    potassium, sodium phosphates, 2 packet, Oral, PRN    potassium, sodium phosphates, 2 packet, Oral, PRN    potassium, sodium phosphates, 2 packet, Oral, PRN    sodium chloride 0.9%, 10 mL, Intravenous, Q12H PRN    Pharmacy to dose Vancomycin consult, , , Once **AND** vancomycin - pharmacy to dose, , Intravenous, pharmacy to manage frequency    Labs: Pertinent Labs Reviewed  Clinical Chemistry:  Recent Labs   Lab 05/01/24  0546 05/01/24  2030 05/02/24  0400 05/03/24  0506      < > 139 134*   K 4.3   < > 4.8 4.8      < > 108 102   CO2 24   < > 24 25   *   < > 253* 236*   BUN 29*   < > 24* 23   CREATININE 0.7   < > 0.7 0.7   CALCIUM 8.6*   < > 8.5* 8.5*   PROT 6.7  --  6.4 6.5   ALBUMIN 2.6*  --  2.5* 2.5*   BILITOT 0.3  --  0.3 0.3   ALKPHOS 136*  --  119 132   AST 22  --  26 25   ALT 43  --  39 37   ANIONGAP 8   < > 7* 7*   MG 1.7  --  1.8 1.7    < > = values in this interval not displayed.     CBC:   Recent Labs   Lab 05/03/24  0506   WBC 25.39*   RBC 3.10*   HGB 8.6*   HCT 26.3*   *   MCV 85   MCH 27.7   MCHC 32.7     Lipid Panel:  No results for input(s): "CHOL", "HDL", "LDLCALC", "TRIG", "CHOLHDL" in the last 168 hours.  Cardiac Profile:  Recent Labs   Lab 04/27/24  1121 05/02/24  0400   * 82     Inflammatory Labs:  Recent Labs   Lab 04/29/24  0628 05/01/24  0546 05/03/24  0506   CRP >16.00* 14.40* 8.00*     Diabetes:  No results for input(s): "HGBA1C", "POCTGLUCOSE" in the last 168 hours.  Thyroid & Parathyroid:  No results for input(s): "TSH", "FREET4", "S2UKRJC", "S1TZIPF", "THYROIDAB" in the " last 168 hours.    Monitor and Evaluation  Food and Nutrient Intake: energy intake, food and beverage intake  Food and Nutrient Adminstration: diet order  Knowledge/Beliefs/Attitudes: food and nutrition knowledge/skill  Physical Activity and Function: nutrition-related ADLs and IADLs  Anthropometric Measurements: weight change, weight, body mass index  Biochemical Data, Medical Tests and Procedures: gastrointestinal profile, electrolyte and renal panel, glucose/endocrine profile, inflammatory profile, lipid profile  Nutrition-Focused Physical Findings: overall appearance     Nutrition Risk  Level of Risk/Frequency of Follow-up:  (2 times / week)     Nutrition Follow-Up  RD Follow-up?: Yes      Jaelyn Mireles RD 05/03/2024 3:37 PM

## 2024-05-03 NOTE — PLAN OF CARE
Patient not medically ready for discharge at this time but the d/c plan is to return to [facility].     05/03/24 1542   Post-Acute Status   Post-Acute Authorization Placement   Post-Acute Placement Status Pending medical clearance/testing

## 2024-05-03 NOTE — ASSESSMENT & PLAN NOTE
Chronic.    Temp:  [97.9 °F (36.6 °C)-98.7 °F (37.1 °C)]   Pulse:  [69-76]   Resp:  [14-20]   BP: (176-192)/(79-89)   SpO2:  [93 %-100 %] .   Home meds for hypertension were reviewed and noted below.   Hypertension Medications               amLODIPine (NORVASC) 10 MG tablet 1 tablet (10 mg total) by Per G Tube route once daily.    furosemide (LASIX) 40 MG tablet 40 mg by Per G Tube route once daily.    losartan (COZAAR) 100 MG tablet 1 tablet (100 mg total) by Per G Tube route once daily.    metoprolol tartrate (LOPRESSOR) 25 MG tablet 25 mg by Per G Tube route 2 (two) times daily.    cloNIDine (CATAPRES) 0.1 MG tablet 1 tablet (0.1 mg total) by Per G Tube route 3 (three) times daily.          Will order clonidine 0.1 q8h prn only in case pt went into rebound HTN.5/1: pt consistently hypertensive added home norvasc and metoprolol

## 2024-05-03 NOTE — ASSESSMENT & PLAN NOTE
"This patient does have evidence of infective focus  My overall impression is  severe sepsis :  Evidenced by leukocytosis, fever, current infection, and lactic acidosis.  Source: Urinary Tract  Antibiotics given-   Antibiotics (72h ago, onward)      Start     Stop Route Frequency Ordered    05/02/24 1400  levoFLOXacin 750 mg/150 mL IVPB 750 mg         -- IV Every 24 hours (non-standard times) 05/02/24 1250    05/01/24 1800  vancomycin 750 mg in dextrose 5 % 250 mL IVPB (ready to mix)        Note to Pharmacy: Ht: 4' 11" (1.499 m)  Wt: 53.5 kg (118 lb)  Estimated Creatinine Clearance: 47.4 mL/min (based on SCr of 0.9 mg/dL).  Body mass index is 23.83 kg/m².    -- IV Every 24 hours (non-standard times) 04/30/24 1618    04/30/24 1653  vancomycin - pharmacy to dose  (vancomycin IVPB (PEDS and ADULTS))        Placed in "And" Linked Group    -- IV pharmacy to manage frequency 04/30/24 1554    04/29/24 0800  cefTAZidime-avibactam (AVYCAZ) 1.25 g in dextrose 5 % (D5W) 100 mL         05/06/24 0759 IV Every 8 hours (non-standard times) 04/29/24 0046          Latest lactate reviewed-  Recent Labs   Lab 05/02/24  0400   LACTATE 1.8       Organ dysfunction indicated by Acute kidney injury-- which has resolved    Fluid challenge Not needed - patient is not hypotensive      Post- resuscitation assessment No - Post resuscitation assessment not needed       Will Not start Pressors- Levophed for MAP of 65  Source control achieved by:  IV antibiotics  ---------------------------------------------------------------  It is noted that patient has history of recurrent episodes of sepsis due to complicated UTI.  Awaiting urine culture results  Procalcitonin 8.983.  Not sure if this level is improved since an initial level was not obtained when patient was admitted.  Highest Procalcitonin was 133 with patient's last episode of sepsis 12/2/23.  Lactic acidosis resolved  Urine culture positive Klebsiella (/MDRO) and Providencia stuartii/ " repeat urine culture negative so far  Blood cultures collected 4/25 (+) Staphylococcus haemolyticus/ repeat blood cultures collected 4/29 negative so far  Stool negative for acute C diff infection  Continue IV vancomycin and ceftazidime-avibactam   Continue IV fluid hydration  Fevers are slowly improving. Had temp 202.2 4/28 @ 2245.  First dose of ceftazidime-avibactam given 4/29 @ 0035.  T-max since starting new antibiotic was 100.9 @ 4/29 0420.  Last antipyretic given 4/29 @2130.  GI PCR + for salmonella and abx adjusted to vanc, avycaz, and Levaquin 5/2.   5/3: WBC increasing on am labs; CXR with worsening infiltrates; discussed case with the patient's POA at length; will consult palliative Care; at this time POA wishes for patient's remained full code

## 2024-05-03 NOTE — PROGRESS NOTES
Critical access hospital Medicine  Progress Note    Patient Name: Steff Johnson  MRN: [id number]  Patient Class: IP- Inpatient   Admission Date: 4/25/2024  Length of Stay: 8 days  Attending Physician: Amber Fernández MD  Primary Care Provider: Cristina Ren MD        Subjective:     Principal Problem:Severe sepsis        HPI:  65 yo female with PMH of CVA with known baseline confusion and aphasia who was sent to the ER because of black drainage coming from around the PEG tube site and having black vomit.     On presentation to the ER, vitals showed 101/59, HR of 107, RR of 25 and fever of 102.3, spo2 of 95%. Cbc with wbc of 28 and anemia of 8.8. CMP showed acute renal failure with creatinine of of 2.5, anion gap of 19, hyponatermia of 133, potassium of 5.9. lactic acid of 6.1. Pt started to have projectile hematemesis. NGT was placed. Mathur was replaced after which pt developed hematuria. UA was grossly positive. C.diffi antigen is positive. EKG showed sinus tachycardia. CT C/A/P showed scattered nonspecific bronchiolitis in both lungs, with right lower lobes opacities potentially reflecting chronic bronchopneumonia, given similar appearance to CT of 12/11/2023. Pt was given the hyperkalemia procotol, 1.6 L of fluids, vanco/zosyn, and protonix 80 mg iv. She will be admitted to medicine for further management.     Overview/Hospital Course:  Ms. Johnson is a resident of a local nursing home she was admitted for GI hemorrhage, severe sepsis, COURTNEY, UTI, and C. Diff. while here, her labs and vital signs were monitored and she was maintained on telemetry.  He was placed on IV vancomycin, Merrem, and vancomycin per PEG while awaiting culture results.  Infectious Disease was consulted to assist with management.  She has a chronic Mathur catheter, which was replaced, and she initially developed hematuria, likely from the trauma of the Mathur catheter being inserted, which did resolve.  She was placed on IV Protonix and  GI was consulted.  She underwent an EGD which was negative for any bleeding and showed moderate/severe distal esophagitis and non-erosive gastritis.  Her anticoagulants, aspirin and Eliquis, were then resumed.  She initially had an NG tube, which was discontinued at some point.  Her continuous tube feedings via PEG were resume and she has tolerated.  She was placed on IV fluids her COURTNEY and her renal function improved.  Her urine culture started grew out Klebsiella (/MDRO) and Providencia stuartii and the Merrem was discontinued and she was started on ceftazidime-avibactam.  Her blood cultures were also positive with Staphylococcus haemolyticus and she was continued on IV vancomycin.  Her stool studies were negative for acute C diff infection however she continued on vancomycin per PEG empirically due to her risk for developing C diff. Repeat urine and blood cultures were collected.  He continued to have loose stools, which may have been due to the continuous tube feedings, and she was started on acidophilus per PEG.  She has a chronic sacral decubitus and Wound Care was consulted and followed her for her wound management.  Her wound did not appear to be contributory to her sepsis.                Interval History:  Patient seen and examined. Overnight notes reviewed.  WBC increased on am labs. Afebrile. GI PCR was positive for Salmonella and antibiotics adjusted per ID. Repeat blood cultures from 04/30 with NGTD. ID following. WBC increased on am labs. CXR ordered. Discussed case with Leonie CISNEROS, and answered questions. Will consult Palliative care.      Review of Systems   Unable to perform ROS: Patient nonverbal     Objective:     Vital Signs (Most Recent):  Temp: 98 °F (36.7 °C) (05/03/24 0755)  Pulse: 76 (05/03/24 1323)  Resp: 20 (05/03/24 1323)  BP: (!) 177/79 (05/03/24 0810)  SpO2: (!) 93 % (05/03/24 1323) Vital Signs (24h Range):  Temp:  [97.9 °F (36.6 °C)-98.8 °F (37.1 °C)] 98 °F (36.7 °C)  Pulse:  [69-79]  76  Resp:  [14-20] 20  SpO2:  [93 %-100 %] 93 %  BP: (155-192)/(75-89) 177/79     Weight: 53.5 kg (118 lb)  Body mass index is 23.83 kg/m².    Intake/Output Summary (Last 24 hours) at 5/3/2024 1613  Last data filed at 5/3/2024 1611  Gross per 24 hour   Intake 350 ml   Output 1750 ml   Net -1400 ml         Physical Exam  Vitals and nursing note reviewed.   Constitutional:       Appearance: Normal appearance. She is normal weight. She is ill-appearing (chronically).   HENT:      Head: Normocephalic and atraumatic.      Mouth/Throat:      Mouth: Mucous membranes are moist.      Pharynx: Oropharynx is clear.   Eyes:      Extraocular Movements: Extraocular movements intact.   Cardiovascular:      Rate and Rhythm: Normal rate and regular rhythm.      Pulses: Normal pulses.      Heart sounds: Normal heart sounds.   Pulmonary:      Effort: Pulmonary effort is normal.      Breath sounds: Decreased breath sounds present.   Abdominal:      General: Abdomen is flat. Bowel sounds are normal.      Palpations: Abdomen is soft.      Tenderness: There is no abdominal tenderness. There is no guarding or rebound.      Comments: G tube in place.    Genitourinary:     Comments: Mathur catheter in place.   Rectal tube with stool noted.   Neurological:      Mental Status: She is alert. Mental status is at baseline.             Significant Labs: All pertinent labs within the past 24 hours have been reviewed.  CBC:   Recent Labs   Lab 05/02/24  0400 05/02/24  1201 05/03/24  0506   WBC 23.69*  --  25.39*   HGB 8.2*  8.3* 7.9* 8.6*   HCT 25.0*  --  26.3*   *  --  619*     CMP:   Recent Labs   Lab 05/01/24  2030 05/02/24  0400 05/03/24  0506    139 134*   K 4.4 4.8 4.8    108 102   CO2 23 24 25   * 253* 236*   BUN 23 24* 23   CREATININE 0.7 0.7 0.7   CALCIUM 8.4* 8.5* 8.5*   PROT  --  6.4 6.5   ALBUMIN  --  2.5* 2.5*   BILITOT  --  0.3 0.3   ALKPHOS  --  119 132   AST  --  26 25   ALT  --  39 37   ANIONGAP 9 7* 7*  "      Significant Imaging: I have reviewed all pertinent imaging results/findings within the past 24 hours.    CXR:  Bilateral lower lung opacities suspicious for multifocal pneumonia, right greater than left, having progressed compared to prior exams.     Assessment/Plan:      * Severe sepsis  This patient does have evidence of infective focus  My overall impression is  severe sepsis :  Evidenced by leukocytosis, fever, current infection, and lactic acidosis.  Source: Urinary Tract  Antibiotics given-   Antibiotics (72h ago, onward)      Start     Stop Route Frequency Ordered    05/02/24 1400  levoFLOXacin 750 mg/150 mL IVPB 750 mg         -- IV Every 24 hours (non-standard times) 05/02/24 1250    05/01/24 1800  vancomycin 750 mg in dextrose 5 % 250 mL IVPB (ready to mix)        Note to Pharmacy: Ht: 4' 11" (1.499 m)  Wt: 53.5 kg (118 lb)  Estimated Creatinine Clearance: 47.4 mL/min (based on SCr of 0.9 mg/dL).  Body mass index is 23.83 kg/m².    -- IV Every 24 hours (non-standard times) 04/30/24 1618    04/30/24 1653  vancomycin - pharmacy to dose  (vancomycin IVPB (PEDS and ADULTS))        Placed in "And" Linked Group    -- IV pharmacy to manage frequency 04/30/24 1554    04/29/24 0800  cefTAZidime-avibactam (AVYCAZ) 1.25 g in dextrose 5 % (D5W) 100 mL         05/06/24 0759 IV Every 8 hours (non-standard times) 04/29/24 0046          Latest lactate reviewed-  Recent Labs   Lab 05/02/24  0400   LACTATE 1.8       Organ dysfunction indicated by Acute kidney injury-- which has resolved    Fluid challenge Not needed - patient is not hypotensive      Post- resuscitation assessment No - Post resuscitation assessment not needed       Will Not start Pressors- Levophed for MAP of 65  Source control achieved by:  IV antibiotics  ---------------------------------------------------------------  It is noted that patient has history of recurrent episodes of sepsis due to complicated UTI.  Awaiting urine culture " results  Procalcitonin 8.983.  Not sure if this level is improved since an initial level was not obtained when patient was admitted.  Highest Procalcitonin was 133 with patient's last episode of sepsis 12/2/23.  Lactic acidosis resolved  Urine culture positive Klebsiella (/MDRO) and Providencia stuartii/ repeat urine culture negative so far  Blood cultures collected 4/25 (+) Staphylococcus haemolyticus/ repeat blood cultures collected 4/29 negative so far  Stool negative for acute C diff infection  Continue IV vancomycin and ceftazidime-avibactam   Continue IV fluid hydration  Fevers are slowly improving. Had temp 202.2 4/28 @ 2245.  First dose of ceftazidime-avibactam given 4/29 @ 0035.  T-max since starting new antibiotic was 100.9 @ 4/29 0420.  Last antipyretic given 4/29 @2130.  GI PCR + for salmonella and abx adjusted to vanc, avycaz, and Levaquin 5/2.   5/3: WBC increasing on am labs; CXR with worsening infiltrates; discussed case with the patient's POA at length; will consult palliative Care; at this time POA wishes for patient's remained full code    Bacteremia  Blood cultures collected 4/25 (+) Staphylococcus haemolyticus  Repeat blood cultures collected 4/29  and 4/30 negative so far   Continue IV vancomycin      Encephalopathy chronic        Quadriplegia  Chronic/due to history of CVA  Continue pressure relief measures      History of ischemic stroke  Continue prevention: statin, ASA, Eliquis     Acute kidney injury  Essentially resolved/creatinine normal.    Patient with acute kidney injury/acute renal failure likely due to pre-renal azotemia due to IVVD 2/2 GIB and Sepsis. COURTNEY is currently stable. Baseline creatinine  0.7  - Labs reviewed- Renal function/electrolytes with Estimated Creatinine Clearance: 60.9 mL/min (based on SCr of 0.7 mg/dL). according to latest data. Monitor urine output and serial BMP and adjust therapy as needed. Avoid nephrotoxins and renally dose meds for GFR listed  "above.      Type 2 diabetes mellitus with neurologic complication, with long-term current use of insulin  Patient's FSGs are uncontrolled due to hyperglycemia on current medication regimen.  Last A1c reviewed-   Lab Results   Component Value Date    HGBA1C 7.8 (H) 12/05/2023     Most recent fingerstick glucose reviewed- No results for input(s): "POCTGLUCOSE" in the last 24 hours.  Current correctional scale  Low  Increase anti-hyperglycemic dose as follows-   Antihyperglycemics (From admission, onward)      Start     Stop Route Frequency Ordered    04/30/24 2100  insulin detemir U-100 (Levemir) pen 18 Units         -- SubQ Nightly 04/30/24 1550    04/30/24 1649  insulin aspart U-100 pen 0-10 Units         -- SubQ Every 6 hours PRN 04/30/24 1550          Hold Oral hypoglycemics while patient is in the hospital.  -----------------------------------------------  Increase basal insulin to 18 units nightly and Increase SSI to medium dose      Hypertension  Chronic.    Temp:  [97.9 °F (36.6 °C)-98.7 °F (37.1 °C)]   Pulse:  [69-76]   Resp:  [14-20]   BP: (176-192)/(79-89)   SpO2:  [93 %-100 %] .   Home meds for hypertension were reviewed and noted below.   Hypertension Medications               amLODIPine (NORVASC) 10 MG tablet 1 tablet (10 mg total) by Per G Tube route once daily.    furosemide (LASIX) 40 MG tablet 40 mg by Per G Tube route once daily.    losartan (COZAAR) 100 MG tablet 1 tablet (100 mg total) by Per G Tube route once daily.    metoprolol tartrate (LOPRESSOR) 25 MG tablet 25 mg by Per G Tube route 2 (two) times daily.    cloNIDine (CATAPRES) 0.1 MG tablet 1 tablet (0.1 mg total) by Per G Tube route 3 (three) times daily.          Will order clonidine 0.1 q8h prn only in case pt went into rebound HTN.5/1: pt consistently hypertensive added home norvasc and metoprolol     Clostridium difficile infection  Stool positive C diff antigen, negative toxins, Negative C. Diff PCR---> not acute C. Diff infection  ID " following-- continue oral vancomycin for now  Diarrhea possibly from tube feedings  Start lactobacillus 2 tabs bid  GI PCR was positive for Salmonella and antibiotics adjusted per ID    Complicated UTI (urinary tract infection)  History of chronic Mathur catheter  Catheter changed on 04/25/24   Urine culture collected 4/23 (+) Klebsiella pneumoniae (/MDRO) and Providencia stuartii  Urine culture collected 4/29 negative so far  Continue ceftazidime-avibactam (Started 4/29)  UA collected 4/29 improved when compared to UA collected 4/25: Bact- few (4/29),Many (4/25)/ Color- yellow (was red 4/25)/ Blood- 1+ (was 3+)/ still with pyuria and 3+ Leuk Est/ Remains Nitrite neg  Awaiting urine culture results    Acute blood loss anemia  Patient's anemia is currently controlled. Has received 2 units of PRBCs on 4/24 . Etiology likely d/t acute blood loss which was from GIB  Current CBC reviewed-   Lab Results   Component Value Date    HGB 8.6 (L) 05/03/2024    HCT 26.3 (L) 05/03/2024     Monitor serial CBC and transfuse if patient becomes hemodynamically unstable, symptomatic or H/H drops below 7/21.    Hyperkalemia  Patient has hypokalemia which is Acute and currently controlled. Most recent potassium levels reviewed-   Lab Results   Component Value Date    K 4.8 05/03/2024   . Will continue potassium replacement per protocol and recheck repeat levels after replacement completed.   -----------------------------------------------------------------------  Potassium currently normal        GI hemorrhage  GI consulted: S/P EGD: moderate/severe distal esophagitis, no bleeding.  Aspirin Eliquis has been resumed  Continue IV Protonix b.i.d.    Seizure disorder  Controlled.  Continue chronic antiepileptic.  Seizure precautions       Sacral decubitus ulcer, stage III  Chronic issue  Wound Care following  Continue current wound care regimen   Continue pressure relief measures      Aphasia  Due to history of CVA        VTE Risk  Mitigation (From admission, onward)           Ordered     apixaban tablet 2.5 mg  2 times daily         05/01/24 1524     IP VTE HIGH RISK PATIENT  Once         04/25/24 1903     Place sequential compression device  Until discontinued         04/25/24 1903                    Discharge Planning   ALEX: 5/8/2024     Code Status: Full Code   Is the patient medically ready for discharge?:     Reason for patient still in hospital (select all that apply): Patient trending condition, Laboratory test, Treatment, Imaging, Consult recommendations, and Pending disposition  Discharge Plan A: Return to nursing home                  Lizett Scales PA-C  Department of Hospital Medicine   [facility]

## 2024-05-03 NOTE — PLAN OF CARE
Problem: Enteral Nutrition  Goal: Feeding Tolerance  Outcome: Met  Intervention: Prevent and Manage Feeding Intolerance  Flowsheets (Taken 5/3/2024 0683)  Nutrition Support Management: tube feeding continued as ordered

## 2024-05-03 NOTE — CARE UPDATE
05/02/24 2045   Patient Assessment/Suction   Level of Consciousness (AVPU) alert   Respiratory Effort Unlabored;Normal   Expansion/Accessory Muscles/Retractions no use of accessory muscles;no retractions;expansion symmetric   All Lung Fields Breath Sounds clear;diminished   Rhythm/Pattern, Respiratory unlabored;pattern regular;shallow;no shortness of breath reported   Cough Frequency infrequent   PRE-TX-O2   Device (Oxygen Therapy) nasal cannula   $ Is the patient on Low Flow Oxygen? Yes   Flow (L/min) (Oxygen Therapy) 2   SpO2 99 %   Pulse Oximetry Type Intermittent   $ Pulse Oximetry - Single Charge Pulse Oximetry - Single   Pulse 69   Resp 18   Aerosol Therapy   $ Aerosol Therapy Charges Aerosol Treatment  (duo)   Daily Review of Necessity (SVN) completed   Respiratory Treatment Status (SVN) given   Treatment Route (SVN) mask;oxygen   Patient Position HOB elevated   Post Treatment Assessment (SVN) increased aeration   Signs of Intolerance (SVN) none   Breath Sounds Post-Respiratory Treatment   Throughout All Fields Post-Treatment All Fields   Throughout All Fields Post-Treatment aeration increased   Post-treatment Heart Rate (beats/min) 71   Post-treatment Resp Rate (breaths/min) 18

## 2024-05-03 NOTE — ASSESSMENT & PLAN NOTE
Patient has hypokalemia which is Acute and currently controlled. Most recent potassium levels reviewed-   Lab Results   Component Value Date    K 4.8 05/03/2024   . Will continue potassium replacement per protocol and recheck repeat levels after replacement completed.   -----------------------------------------------------------------------  Potassium currently normal

## 2024-05-03 NOTE — SUBJECTIVE & OBJECTIVE
Interval History:  Patient seen and examined. Overnight notes reviewed.  WBC increased on am labs. Afebrile. GI PCR was positive for Salmonella and antibiotics adjusted per ID. Repeat blood cultures from 04/30 with NGTD. ID following. WBC increased on am labs. CXR ordered. Discussed case with Leonie CISNEROS, and answered questions. Will consult Palliative care.      Review of Systems   Unable to perform ROS: Patient nonverbal     Objective:     Vital Signs (Most Recent):  Temp: 98 °F (36.7 °C) (05/03/24 0755)  Pulse: 76 (05/03/24 1323)  Resp: 20 (05/03/24 1323)  BP: (!) 177/79 (05/03/24 0810)  SpO2: (!) 93 % (05/03/24 1323) Vital Signs (24h Range):  Temp:  [97.9 °F (36.6 °C)-98.8 °F (37.1 °C)] 98 °F (36.7 °C)  Pulse:  [69-79] 76  Resp:  [14-20] 20  SpO2:  [93 %-100 %] 93 %  BP: (155-192)/(75-89) 177/79     Weight: 53.5 kg (118 lb)  Body mass index is 23.83 kg/m².    Intake/Output Summary (Last 24 hours) at 5/3/2024 1613  Last data filed at 5/3/2024 1611  Gross per 24 hour   Intake 350 ml   Output 1750 ml   Net -1400 ml         Physical Exam  Vitals and nursing note reviewed.   Constitutional:       Appearance: Normal appearance. She is normal weight. She is ill-appearing (chronically).   HENT:      Head: Normocephalic and atraumatic.      Mouth/Throat:      Mouth: Mucous membranes are moist.      Pharynx: Oropharynx is clear.   Eyes:      Extraocular Movements: Extraocular movements intact.   Cardiovascular:      Rate and Rhythm: Normal rate and regular rhythm.      Pulses: Normal pulses.      Heart sounds: Normal heart sounds.   Pulmonary:      Effort: Pulmonary effort is normal.      Breath sounds: Decreased breath sounds present.   Abdominal:      General: Abdomen is flat. Bowel sounds are normal.      Palpations: Abdomen is soft.      Tenderness: There is no abdominal tenderness. There is no guarding or rebound.      Comments: G tube in place.    Genitourinary:     Comments: Mathur catheter in place.   Rectal tube  with stool noted.   Neurological:      Mental Status: She is alert. Mental status is at baseline.             Significant Labs: All pertinent labs within the past 24 hours have been reviewed.  CBC:   Recent Labs   Lab 05/02/24  0400 05/02/24  1201 05/03/24  0506   WBC 23.69*  --  25.39*   HGB 8.2*  8.3* 7.9* 8.6*   HCT 25.0*  --  26.3*   *  --  619*     CMP:   Recent Labs   Lab 05/01/24  2030 05/02/24  0400 05/03/24  0506    139 134*   K 4.4 4.8 4.8    108 102   CO2 23 24 25   * 253* 236*   BUN 23 24* 23   CREATININE 0.7 0.7 0.7   CALCIUM 8.4* 8.5* 8.5*   PROT  --  6.4 6.5   ALBUMIN  --  2.5* 2.5*   BILITOT  --  0.3 0.3   ALKPHOS  --  119 132   AST  --  26 25   ALT  --  39 37   ANIONGAP 9 7* 7*       Significant Imaging: I have reviewed all pertinent imaging results/findings within the past 24 hours.    CXR:  Bilateral lower lung opacities suspicious for multifocal pneumonia, right greater than left, having progressed compared to prior exams.

## 2024-05-03 NOTE — CARE UPDATE
05/03/24 0732   Patient Assessment/Suction   Level of Consciousness (AVPU) alert   Respiratory Effort Unlabored;Normal   Expansion/Accessory Muscles/Retractions no use of accessory muscles   All Lung Fields Breath Sounds Anterior:;diminished;clear   Rhythm/Pattern, Respiratory pattern regular   PRE-TX-O2   Device (Oxygen Therapy) nasal cannula   $ Is the patient on Low Flow Oxygen? Yes   Flow (L/min) (Oxygen Therapy) 2   SpO2 100 %   Pulse Oximetry Type Intermittent   $ Pulse Oximetry - Multiple Charge Pulse Oximetry - Multiple   Pulse 71   Resp 16   Aerosol Therapy   $ Aerosol Therapy Charges Aerosol Treatment   Daily Review of Necessity (SVN) completed   Respiratory Treatment Status (SVN) given   Treatment Route (SVN) mask;oxygen   Patient Position HOB elevated   Post Treatment Assessment (SVN) breath sounds unchanged   Signs of Intolerance (SVN) none   Breath Sounds Post-Respiratory Treatment   Throughout All Fields Post-Treatment All Fields   Throughout All Fields Post-Treatment no change   Post-treatment Heart Rate (beats/min) 725   Post-treatment Resp Rate (breaths/min) 16   Education   $ Education Bronchodilator;15 min

## 2024-05-03 NOTE — ASSESSMENT & PLAN NOTE
Patient's anemia is currently controlled. Has received 2 units of PRBCs on 4/24 . Etiology likely d/t acute blood loss which was from GIB  Current CBC reviewed-   Lab Results   Component Value Date    HGB 8.6 (L) 05/03/2024    HCT 26.3 (L) 05/03/2024     Monitor serial CBC and transfuse if patient becomes hemodynamically unstable, symptomatic or H/H drops below 7/21.

## 2024-05-04 LAB
ALBUMIN SERPL BCP-MCNC: 2.6 G/DL (ref 3.5–5.2)
ALP SERPL-CCNC: 132 U/L (ref 55–135)
ALT SERPL W/O P-5'-P-CCNC: 27 U/L (ref 10–44)
ANION GAP SERPL CALC-SCNC: 9 MMOL/L (ref 8–16)
ANISOCYTOSIS BLD QL SMEAR: SLIGHT
AST SERPL-CCNC: 16 U/L (ref 10–40)
BACTERIA BLD CULT: NORMAL
BASOPHILS NFR BLD: 0 % (ref 0–1.9)
BILIRUB SERPL-MCNC: 0.3 MG/DL (ref 0.1–1)
BUN SERPL-MCNC: 25 MG/DL (ref 8–23)
CALCIUM SERPL-MCNC: 8.3 MG/DL (ref 8.7–10.5)
CHLORIDE SERPL-SCNC: 102 MMOL/L (ref 95–110)
CO2 SERPL-SCNC: 22 MMOL/L (ref 23–29)
CREAT SERPL-MCNC: 0.9 MG/DL (ref 0.5–1.4)
DIFFERENTIAL METHOD BLD: ABNORMAL
EOSINOPHIL NFR BLD: 3 % (ref 0–8)
ERYTHROCYTE [DISTWIDTH] IN BLOOD BY AUTOMATED COUNT: 17.8 % (ref 11.5–14.5)
EST. GFR  (NO RACE VARIABLE): >60 ML/MIN/1.73 M^2
GLUCOSE SERPL-MCNC: 188 MG/DL (ref 70–110)
GLUCOSE SERPL-MCNC: 209 MG/DL (ref 70–110)
GLUCOSE SERPL-MCNC: 241 MG/DL (ref 70–110)
GLUCOSE SERPL-MCNC: 295 MG/DL (ref 70–110)
HCT VFR BLD AUTO: 24 % (ref 37–48.5)
HGB BLD-MCNC: 8 G/DL (ref 12–16)
HYPOCHROMIA BLD QL SMEAR: ABNORMAL
IMM GRANULOCYTES # BLD AUTO: ABNORMAL K/UL (ref 0–0.04)
IMM GRANULOCYTES NFR BLD AUTO: ABNORMAL % (ref 0–0.5)
LYMPHOCYTES NFR BLD: 9 % (ref 18–48)
MAGNESIUM SERPL-MCNC: 1.9 MG/DL (ref 1.6–2.6)
MCH RBC QN AUTO: 27.8 PG (ref 27–31)
MCHC RBC AUTO-ENTMCNC: 33.3 G/DL (ref 32–36)
MCV RBC AUTO: 83 FL (ref 82–98)
MONOCYTES NFR BLD: 3 % (ref 4–15)
MYELOCYTES NFR BLD MANUAL: 4 %
NEUTROPHILS NFR BLD: 79 % (ref 38–73)
NEUTS BAND NFR BLD MANUAL: 2 %
NRBC BLD-RTO: 0 /100 WBC
PLATELET # BLD AUTO: 675 K/UL (ref 150–450)
PLATELET BLD QL SMEAR: ABNORMAL
PMV BLD AUTO: 10.4 FL (ref 9.2–12.9)
POTASSIUM SERPL-SCNC: 4.6 MMOL/L (ref 3.5–5.1)
PROT SERPL-MCNC: 6.3 G/DL (ref 6–8.4)
RBC # BLD AUTO: 2.88 M/UL (ref 4–5.4)
SODIUM SERPL-SCNC: 133 MMOL/L (ref 136–145)
TARGETS BLD QL SMEAR: ABNORMAL
WBC # BLD AUTO: 24.18 K/UL (ref 3.9–12.7)

## 2024-05-04 PROCEDURE — 63600175 PHARM REV CODE 636 W HCPCS: Performed by: NURSE PRACTITIONER

## 2024-05-04 PROCEDURE — 80053 COMPREHEN METABOLIC PANEL: CPT | Performed by: HOSPITALIST

## 2024-05-04 PROCEDURE — 25000003 PHARM REV CODE 250: Performed by: NURSE PRACTITIONER

## 2024-05-04 PROCEDURE — 63600175 PHARM REV CODE 636 W HCPCS: Performed by: HOSPITALIST

## 2024-05-04 PROCEDURE — C9113 INJ PANTOPRAZOLE SODIUM, VIA: HCPCS | Performed by: INTERNAL MEDICINE

## 2024-05-04 PROCEDURE — 94640 AIRWAY INHALATION TREATMENT: CPT

## 2024-05-04 PROCEDURE — 63600175 PHARM REV CODE 636 W HCPCS

## 2024-05-04 PROCEDURE — 25000003 PHARM REV CODE 250: Performed by: INTERNAL MEDICINE

## 2024-05-04 PROCEDURE — 83735 ASSAY OF MAGNESIUM: CPT | Performed by: HOSPITALIST

## 2024-05-04 PROCEDURE — 25000003 PHARM REV CODE 250: Performed by: HOSPITALIST

## 2024-05-04 PROCEDURE — 12000002 HC ACUTE/MED SURGE SEMI-PRIVATE ROOM

## 2024-05-04 PROCEDURE — 63600175 PHARM REV CODE 636 W HCPCS: Performed by: INTERNAL MEDICINE

## 2024-05-04 PROCEDURE — 36415 COLL VENOUS BLD VENIPUNCTURE: CPT | Performed by: HOSPITALIST

## 2024-05-04 PROCEDURE — 99233 SBSQ HOSP IP/OBS HIGH 50: CPT | Mod: ,,, | Performed by: STUDENT IN AN ORGANIZED HEALTH CARE EDUCATION/TRAINING PROGRAM

## 2024-05-04 PROCEDURE — 94761 N-INVAS EAR/PLS OXIMETRY MLT: CPT

## 2024-05-04 PROCEDURE — 85007 BL SMEAR W/DIFF WBC COUNT: CPT | Performed by: HOSPITALIST

## 2024-05-04 PROCEDURE — 25000003 PHARM REV CODE 250

## 2024-05-04 PROCEDURE — 63600175 PHARM REV CODE 636 W HCPCS: Mod: JZ,JG | Performed by: NURSE PRACTITIONER

## 2024-05-04 PROCEDURE — 25000003 PHARM REV CODE 250: Performed by: STUDENT IN AN ORGANIZED HEALTH CARE EDUCATION/TRAINING PROGRAM

## 2024-05-04 PROCEDURE — 25000242 PHARM REV CODE 250 ALT 637 W/ HCPCS: Performed by: HOSPITALIST

## 2024-05-04 PROCEDURE — 63600175 PHARM REV CODE 636 W HCPCS: Performed by: STUDENT IN AN ORGANIZED HEALTH CARE EDUCATION/TRAINING PROGRAM

## 2024-05-04 PROCEDURE — 85027 COMPLETE CBC AUTOMATED: CPT | Performed by: HOSPITALIST

## 2024-05-04 RX ADMIN — METOPROLOL TARTRATE 25 MG: 25 TABLET, FILM COATED ORAL at 08:05

## 2024-05-04 RX ADMIN — IPRATROPIUM BROMIDE AND ALBUTEROL SULFATE 3 ML: 2.5; .5 SOLUTION RESPIRATORY (INHALATION) at 01:05

## 2024-05-04 RX ADMIN — INSULIN ASPART 4 UNITS: 100 INJECTION, SOLUTION INTRAVENOUS; SUBCUTANEOUS at 09:05

## 2024-05-04 RX ADMIN — CEFTAZIDIME, AVIBACTAM 1.25 G: 2; .5 POWDER, FOR SOLUTION INTRAVENOUS at 12:05

## 2024-05-04 RX ADMIN — METOCLOPRAMIDE 5 MG: 5 INJECTION, SOLUTION INTRAMUSCULAR; INTRAVENOUS at 11:05

## 2024-05-04 RX ADMIN — AMLODIPINE BESYLATE 10 MG: 5 TABLET ORAL at 08:05

## 2024-05-04 RX ADMIN — HYDROCODONE BITARTRATE AND ACETAMINOPHEN 1 TABLET: 5; 325 TABLET ORAL at 08:05

## 2024-05-04 RX ADMIN — CEFTAZIDIME, AVIBACTAM 1.25 G: 2; .5 POWDER, FOR SOLUTION INTRAVENOUS at 05:05

## 2024-05-04 RX ADMIN — APIXABAN 2.5 MG: 2.5 TABLET, FILM COATED ORAL at 08:05

## 2024-05-04 RX ADMIN — CEFTAZIDIME, AVIBACTAM 1.25 G: 2; .5 POWDER, FOR SOLUTION INTRAVENOUS at 08:05

## 2024-05-04 RX ADMIN — LEVOFLOXACIN 750 MG: 5 INJECTION, SOLUTION INTRAVENOUS at 03:05

## 2024-05-04 RX ADMIN — INSULIN ASPART 6 UNITS: 100 INJECTION, SOLUTION INTRAVENOUS; SUBCUTANEOUS at 05:05

## 2024-05-04 RX ADMIN — ASPIRIN 81 MG 81 MG: 81 TABLET ORAL at 08:05

## 2024-05-04 RX ADMIN — VANCOMYCIN HYDROCHLORIDE 1000 MG: 1 INJECTION, POWDER, LYOPHILIZED, FOR SOLUTION INTRAVENOUS at 01:05

## 2024-05-04 RX ADMIN — ACETAMINOPHEN 650 MG: 325 TABLET ORAL at 08:05

## 2024-05-04 RX ADMIN — IPRATROPIUM BROMIDE AND ALBUTEROL SULFATE 3 ML: 2.5; .5 SOLUTION RESPIRATORY (INHALATION) at 08:05

## 2024-05-04 RX ADMIN — ATORVASTATIN CALCIUM 80 MG: 40 TABLET, FILM COATED ORAL at 08:05

## 2024-05-04 RX ADMIN — PANTOPRAZOLE SODIUM 40 MG: 40 INJECTION, POWDER, FOR SOLUTION INTRAVENOUS at 08:05

## 2024-05-04 RX ADMIN — LEVETIRACETAM 500 MG: 5 INJECTION INTRAVENOUS at 08:05

## 2024-05-04 RX ADMIN — THIAMINE HCL TAB 100 MG 100 MG: 100 TAB at 08:05

## 2024-05-04 RX ADMIN — FOLIC ACID 1000 MCG: 1 TABLET ORAL at 08:05

## 2024-05-04 RX ADMIN — ALLOPURINOL 100 MG: 100 TABLET ORAL at 08:05

## 2024-05-04 RX ADMIN — LATANOPROST 1 DROP: 50 SOLUTION/ DROPS OPHTHALMIC at 08:05

## 2024-05-04 RX ADMIN — METOCLOPRAMIDE 5 MG: 5 INJECTION, SOLUTION INTRAMUSCULAR; INTRAVENOUS at 05:05

## 2024-05-04 NOTE — SUBJECTIVE & OBJECTIVE
Interval History:  Patient seen and examined. Overnight notes reviewed.  WBC increased on am labs. Afebrile.IV abx. Repeat blood cultures from 04/30 and 5/2 with NGTD. ID following. Leukocytosis on am labs. Discussed case with patient's brother in person on my re-evaluation and Leonie CISNEROS via telephone. Palliative care consulted.      Review of Systems   Unable to perform ROS: Patient nonverbal     Objective:     Vital Signs (Most Recent):  Temp: 98.5 °F (36.9 °C) (05/04/24 1122)  Pulse: 80 (05/04/24 1353)  Resp: 14 (05/04/24 1353)  BP: 137/85 (05/04/24 1122)  SpO2: (!) 92 % (05/04/24 1353) Vital Signs (24h Range):  Temp:  [97.7 °F (36.5 °C)-99.7 °F (37.6 °C)] 98.5 °F (36.9 °C)  Pulse:  [52-88] 80  Resp:  [14-18] 14  SpO2:  [92 %-97 %] 92 %  BP: (130-198)/(62-85) 137/85     Weight: 53.5 kg (118 lb)  Body mass index is 23.83 kg/m².    Intake/Output Summary (Last 24 hours) at 5/4/2024 1527  Last data filed at 5/4/2024 1056  Gross per 24 hour   Intake 750 ml   Output 2420 ml   Net -1670 ml         Physical Exam  Vitals and nursing note reviewed.   Constitutional:       Appearance: Normal appearance. She is normal weight. She is ill-appearing (chronically).   HENT:      Head: Normocephalic and atraumatic.      Mouth/Throat:      Mouth: Mucous membranes are moist.      Pharynx: Oropharynx is clear.   Eyes:      Extraocular Movements: Extraocular movements intact.   Cardiovascular:      Rate and Rhythm: Normal rate and regular rhythm.      Pulses: Normal pulses.      Heart sounds: Normal heart sounds.   Pulmonary:      Effort: Pulmonary effort is normal.      Breath sounds: Decreased breath sounds present.   Abdominal:      General: Abdomen is flat. Bowel sounds are normal.      Palpations: Abdomen is soft.      Tenderness: There is no abdominal tenderness. There is no guarding or rebound.      Comments: G tube in place.    Genitourinary:     Comments: Mathur catheter in place.   Rectal tube with stool noted.    Neurological:      Mental Status: She is alert. Mental status is at baseline.             Significant Labs: All pertinent labs within the past 24 hours have been reviewed.  CBC:   Recent Labs   Lab 05/03/24  0506 05/04/24  0536   WBC 25.39* 24.18*   HGB 8.6* 8.0*   HCT 26.3* 24.0*   * 675*     CMP:   Recent Labs   Lab 05/03/24  0506 05/04/24  0536   * 133*   K 4.8 4.6    102   CO2 25 22*   * 188*   BUN 23 25*   CREATININE 0.7 0.9   CALCIUM 8.5* 8.3*   PROT 6.5 6.3   ALBUMIN 2.5* 2.6*   BILITOT 0.3 0.3   ALKPHOS 132 132   AST 25 16   ALT 37 27   ANIONGAP 7* 9       Significant Imaging: I have reviewed all pertinent imaging results/findings within the past 24 hours.    CXR:  Bilateral lower lung opacities suspicious for multifocal pneumonia, right greater than left, having progressed compared to prior exams.

## 2024-05-04 NOTE — NURSING
Nurses Note -- 4 Eyes      5/3/2024   7:41 PM      Skin assessed during: Daily Assessment      [] No Altered Skin Integrity Present    []Prevention Measures Documented      [x] Yes- Altered Skin Integrity Present or Discovered   [] LDA Added if Not in Epic (Describe Wound)   [] New Altered Skin Integrity was Present on Admit and Documented in LDA   [] Wound Image Taken    Wound Care Consulted? Yes    Attending Nurse:  Rae Webb RN/Staff Member:  DONE

## 2024-05-04 NOTE — ASSESSMENT & PLAN NOTE
Patient's anemia is currently controlled. Has received 2 units of PRBCs on 4/24 . Etiology likely d/t acute blood loss which was from GIB  Current CBC reviewed-   Lab Results   Component Value Date    HGB 8.0 (L) 05/04/2024    HCT 24.0 (L) 05/04/2024     Monitor serial CBC and transfuse if patient becomes hemodynamically unstable, symptomatic or H/H drops below 7/21.

## 2024-05-04 NOTE — ASSESSMENT & PLAN NOTE
"This patient does have evidence of infective focus  My overall impression is  severe sepsis :  Evidenced by leukocytosis, fever, current infection, and lactic acidosis.  Source: Urinary Tract  Antibiotics given-   Antibiotics (72h ago, onward)      Start     Stop Route Frequency Ordered    05/03/24 1900  vancomycin in dextrose 5 % 1 gram/250 mL IVPB 1,000 mg        Note to Pharmacy: Ht: 4' 11" (1.499 m)  Wt: 53.5 kg (118 lb)  Estimated Creatinine Clearance: 47.4 mL/min (based on SCr of 0.9 mg/dL).  Body mass index is 23.83 kg/m².    -- IV Every 18 hours 05/03/24 1854    05/02/24 1400  levoFLOXacin 750 mg/150 mL IVPB 750 mg         -- IV Every 24 hours (non-standard times) 05/02/24 1250    04/30/24 1653  vancomycin - pharmacy to dose  (vancomycin IVPB (PEDS and ADULTS))        Placed in "And" Linked Group    -- IV pharmacy to manage frequency 04/30/24 1554    04/29/24 0800  cefTAZidime-avibactam (AVYCAZ) 1.25 g in dextrose 5 % (D5W) 100 mL         05/06/24 0759 IV Every 8 hours (non-standard times) 04/29/24 0046          Latest lactate reviewed-  Recent Labs   Lab 05/02/24  0400   LACTATE 1.8       Organ dysfunction indicated by Acute kidney injury-- which has resolved    Fluid challenge Not needed - patient is not hypotensive      Post- resuscitation assessment No - Post resuscitation assessment not needed       Will Not start Pressors- Levophed for MAP of 65  Source control achieved by:  IV antibiotics  ---------------------------------------------------------------  It is noted that patient has history of recurrent episodes of sepsis due to complicated UTI.  Awaiting urine culture results  Procalcitonin 8.983.  Not sure if this level is improved since an initial level was not obtained when patient was admitted.  Highest Procalcitonin was 133 with patient's last episode of sepsis 12/2/23.  Lactic acidosis resolved  Urine culture positive Klebsiella (/MDRO) and Providencia stuartii/ repeat urine culture negative " so far  Blood cultures collected 4/25 (+) Staphylococcus haemolyticus/ repeat blood cultures collected 4/29 negative so far  Stool negative for acute C diff infection  Continue IV vancomycin and ceftazidime-avibactam   Continue IV fluid hydration  Fevers are slowly improving. Had temp 202.2 4/28 @ 2245.  First dose of ceftazidime-avibactam given 4/29 @ 0035.  T-max since starting new antibiotic was 100.9 @ 4/29 0420.  Last antipyretic given 4/29 @2130.  GI PCR + for salmonella and abx adjusted to vanc, avycaz, and Levaquin 5/2.   5/3: WBC increasing on am labs; CXR with worsening infiltrates; discussed case with the patient's POA at length; will consult palliative Care; at this time POA wishes for patient's remained full code  5/4: leukocytosis; ID following; IV abx; discussed case with pt's brother in person and POA via phone.

## 2024-05-04 NOTE — PROGRESS NOTES
[facility]  Progress Note    Patient Name: Steff Johnson  MRN: [id number]  Patient Class: IP- Inpatient   Admission Date: 4/25/2024  Length of Stay: 9 days  Attending Physician: Jose A Escoto MD  Primary Care Provider: Cristina Ren MD        Subjective:     Principal Problem:Severe sepsis        HPI:  63 yo female with PMH of CVA with known baseline confusion and aphasia who was sent to the ER because of black drainage coming from around the PEG tube site and having black vomit.     On presentation to the ER, vitals showed 101/59, HR of 107, RR of 25 and fever of 102.3, spo2 of 95%. Cbc with wbc of 28 and anemia of 8.8. CMP showed acute renal failure with creatinine of of 2.5, anion gap of 19, hyponatermia of 133, potassium of 5.9. lactic acid of 6.1. Pt started to have projectile hematemesis. NGT was placed. Mathur was replaced after which pt developed hematuria. UA was grossly positive. C.diffi antigen is positive. EKG showed sinus tachycardia. CT C/A/P showed scattered nonspecific bronchiolitis in both lungs, with right lower lobes opacities potentially reflecting chronic bronchopneumonia, given similar appearance to CT of 12/11/2023. Pt was given the hyperkalemia procotol, 1.6 L of fluids, vanco/zosyn, and protonix 80 mg iv. She will be admitted to medicine for further management.     Overview/Hospital Course:  Ms. Johnson is a resident of a local nursing home she was admitted for GI hemorrhage, severe sepsis, COURTNEY, UTI, and C. Diff. while here, her labs and vital signs were monitored and she was maintained on telemetry.  He was placed on IV vancomycin, Merrem, and vancomycin per PEG while awaiting culture results.  Infectious Disease was consulted to assist with management.  She has a chronic Mathur catheter, which was replaced, and she initially developed hematuria, likely from the trauma of the Mathur catheter being inserted, which did resolve.  She was placed on IV  Protonix and GI was consulted.  She underwent an EGD which was negative for any bleeding and showed moderate/severe distal esophagitis and non-erosive gastritis.  Her anticoagulants, aspirin and Eliquis, were then resumed.  She initially had an NG tube, which was discontinued at some point.  Her continuous tube feedings via PEG were resume and she has tolerated.  She was placed on IV fluids her COURTNEY and her renal function improved.  Her urine culture started grew out Klebsiella (/MDRO) and Providencia stuartii and the Merrem was discontinued and she was started on ceftazidime-avibactam.  Her blood cultures were also positive with Staphylococcus haemolyticus and she was continued on IV vancomycin.  Her stool studies were negative for acute C diff infection however she continued on vancomycin per PEG empirically due to her risk for developing C diff. Repeat urine and blood cultures were collected.  He continued to have loose stools, which may have been due to the continuous tube feedings, and she was started on acidophilus per PEG.  She has a chronic sacral decubitus and Wound Care was consulted and followed her for her wound management.  Her wound did not appear to be contributory to her sepsis.                Interval History:  Patient seen and examined. Overnight notes reviewed.  WBC increased on am labs. Afebrile.IV abx. Repeat blood cultures from 04/30 and 5/2 with NGTD. ID following. Leukocytosis on am labs. Discussed case with patient's brother in person on my re-evaluation and Leonie CISNEROS via telephone. Palliative care consulted.      Review of Systems   Unable to perform ROS: Patient nonverbal     Objective:     Vital Signs (Most Recent):  Temp: 98.5 °F (36.9 °C) (05/04/24 1122)  Pulse: 80 (05/04/24 1353)  Resp: 14 (05/04/24 1353)  BP: 137/85 (05/04/24 1122)  SpO2: (!) 92 % (05/04/24 1353) Vital Signs (24h Range):  Temp:  [97.7 °F (36.5 °C)-99.7 °F (37.6 °C)] 98.5 °F (36.9 °C)  Pulse:  [52-88] 80  Resp:   [14-18] 14  SpO2:  [92 %-97 %] 92 %  BP: (130-198)/(62-85) 137/85     Weight: 53.5 kg (118 lb)  Body mass index is 23.83 kg/m².    Intake/Output Summary (Last 24 hours) at 5/4/2024 1527  Last data filed at 5/4/2024 1056  Gross per 24 hour   Intake 750 ml   Output 2420 ml   Net -1670 ml         Physical Exam  Vitals and nursing note reviewed.   Constitutional:       Appearance: Normal appearance. She is normal weight. She is ill-appearing (chronically).   HENT:      Head: Normocephalic and atraumatic.      Mouth/Throat:      Mouth: Mucous membranes are moist.      Pharynx: Oropharynx is clear.   Eyes:      Extraocular Movements: Extraocular movements intact.   Cardiovascular:      Rate and Rhythm: Normal rate and regular rhythm.      Pulses: Normal pulses.      Heart sounds: Normal heart sounds.   Pulmonary:      Effort: Pulmonary effort is normal.      Breath sounds: Decreased breath sounds present.   Abdominal:      General: Abdomen is flat. Bowel sounds are normal.      Palpations: Abdomen is soft.      Tenderness: There is no abdominal tenderness. There is no guarding or rebound.      Comments: G tube in place.    Genitourinary:     Comments: Mathur catheter in place.   Rectal tube with stool noted.   Neurological:      Mental Status: She is alert. Mental status is at baseline.             Significant Labs: All pertinent labs within the past 24 hours have been reviewed.  CBC:   Recent Labs   Lab 05/03/24  0506 05/04/24  0536   WBC 25.39* 24.18*   HGB 8.6* 8.0*   HCT 26.3* 24.0*   * 675*     CMP:   Recent Labs   Lab 05/03/24  0506 05/04/24  0536   * 133*   K 4.8 4.6    102   CO2 25 22*   * 188*   BUN 23 25*   CREATININE 0.7 0.9   CALCIUM 8.5* 8.3*   PROT 6.5 6.3   ALBUMIN 2.5* 2.6*   BILITOT 0.3 0.3   ALKPHOS 132 132   AST 25 16   ALT 37 27   ANIONGAP 7* 9       Significant Imaging: I have reviewed all pertinent imaging results/findings within the past 24 hours.    CXR:  Bilateral lower  "lung opacities suspicious for multifocal pneumonia, right greater than left, having progressed compared to prior exams.     Assessment/Plan:      * Severe sepsis  This patient does have evidence of infective focus  My overall impression is  severe sepsis :  Evidenced by leukocytosis, fever, current infection, and lactic acidosis.  Source: Urinary Tract  Antibiotics given-   Antibiotics (72h ago, onward)      Start     Stop Route Frequency Ordered    05/03/24 1900  vancomycin in dextrose 5 % 1 gram/250 mL IVPB 1,000 mg        Note to Pharmacy: Ht: 4' 11" (1.499 m)  Wt: 53.5 kg (118 lb)  Estimated Creatinine Clearance: 47.4 mL/min (based on SCr of 0.9 mg/dL).  Body mass index is 23.83 kg/m².    -- IV Every 18 hours 05/03/24 1854    05/02/24 1400  levoFLOXacin 750 mg/150 mL IVPB 750 mg         -- IV Every 24 hours (non-standard times) 05/02/24 1250    04/30/24 1653  vancomycin - pharmacy to dose  (vancomycin IVPB (PEDS and ADULTS))        Placed in "And" Linked Group    -- IV pharmacy to manage frequency 04/30/24 1554    04/29/24 0800  cefTAZidime-avibactam (AVYCAZ) 1.25 g in dextrose 5 % (D5W) 100 mL         05/06/24 0759 IV Every 8 hours (non-standard times) 04/29/24 0046          Latest lactate reviewed-  Recent Labs   Lab 05/02/24  0400   LACTATE 1.8       Organ dysfunction indicated by Acute kidney injury-- which has resolved    Fluid challenge Not needed - patient is not hypotensive      Post- resuscitation assessment No - Post resuscitation assessment not needed       Will Not start Pressors- Levophed for MAP of 65  Source control achieved by:  IV antibiotics  ---------------------------------------------------------------  It is noted that patient has history of recurrent episodes of sepsis due to complicated UTI.  Awaiting urine culture results  Procalcitonin 8.983.  Not sure if this level is improved since an initial level was not obtained when patient was admitted.  Highest Procalcitonin was 133 with " patient's last episode of sepsis 12/2/23.  Lactic acidosis resolved  Urine culture positive Klebsiella (/MDRO) and Providencia stuartii/ repeat urine culture negative so far  Blood cultures collected 4/25 (+) Staphylococcus haemolyticus/ repeat blood cultures collected 4/29 negative so far  Stool negative for acute C diff infection  Continue IV vancomycin and ceftazidime-avibactam   Continue IV fluid hydration  Fevers are slowly improving. Had temp 202.2 4/28 @ 2245.  First dose of ceftazidime-avibactam given 4/29 @ 0035.  T-max since starting new antibiotic was 100.9 @ 4/29 0420.  Last antipyretic given 4/29 @2130.  GI PCR + for salmonella and abx adjusted to vanc, avycaz, and Levaquin 5/2.   5/3: WBC increasing on am labs; CXR with worsening infiltrates; discussed case with the patient's POA at length; will consult palliative Care; at this time POA wishes for patient's remained full code  5/4: leukocytosis; ID following; IV abx; discussed case with pt's brother in person and POA via phone.     Bacteremia  Blood cultures collected 4/25 (+) Staphylococcus haemolyticus  Repeat blood cultures collected 4/30 and 5/2 negative so far   Continue IV vancomycin      Encephalopathy chronic        Quadriplegia  Chronic/due to history of CVA  Continue pressure relief measures      History of ischemic stroke  Continue prevention: statin, ASA, Eliquis     Acute kidney injury  Essentially resolved/creatinine normal.    Patient with acute kidney injury/acute renal failure likely due to pre-renal azotemia due to IVVD 2/2 GIB and Sepsis. COURTNEY is currently stable. Baseline creatinine  0.7  - Labs reviewed- Renal function/electrolytes with Estimated Creatinine Clearance: 60.9 mL/min (based on SCr of 0.7 mg/dL). according to latest data. Monitor urine output and serial BMP and adjust therapy as needed. Avoid nephrotoxins and renally dose meds for GFR listed above.      Type 2 diabetes mellitus with neurologic complication, with  "long-term current use of insulin  Patient's FSGs are uncontrolled due to hyperglycemia on current medication regimen.  Last A1c reviewed-   Lab Results   Component Value Date    HGBA1C 7.8 (H) 12/05/2023     Most recent fingerstick glucose reviewed- No results for input(s): "POCTGLUCOSE" in the last 24 hours.  Current correctional scale  Low  Increase anti-hyperglycemic dose as follows-   Antihyperglycemics (From admission, onward)      Start     Stop Route Frequency Ordered    04/30/24 2100  insulin detemir U-100 (Levemir) pen 18 Units         -- SubQ Nightly 04/30/24 1550    04/30/24 1649  insulin aspart U-100 pen 0-10 Units         -- SubQ Every 6 hours PRN 04/30/24 1550          Hold Oral hypoglycemics while patient is in the hospital.  -----------------------------------------------  Increase basal insulin to 18 units nightly and Increase SSI to medium dose      Hypertension  Chronic.    Temp:  [97.7 °F (36.5 °C)-99.7 °F (37.6 °C)]   Pulse:  [52-88]   Resp:  [14-18]   BP: (130-198)/(62-85)   SpO2:  [92 %-97 %] .   Home meds for hypertension were reviewed and noted below.   Hypertension Medications               amLODIPine (NORVASC) 10 MG tablet 1 tablet (10 mg total) by Per G Tube route once daily.    furosemide (LASIX) 40 MG tablet 40 mg by Per G Tube route once daily.    losartan (COZAAR) 100 MG tablet 1 tablet (100 mg total) by Per G Tube route once daily.    metoprolol tartrate (LOPRESSOR) 25 MG tablet 25 mg by Per G Tube route 2 (two) times daily.    cloNIDine (CATAPRES) 0.1 MG tablet 1 tablet (0.1 mg total) by Per G Tube route 3 (three) times daily.          Will order clonidine 0.1 q8h prn only in case pt went into rebound HTN.5/1: pt consistently hypertensive added home norvasc and metoprolol     Clostridium difficile infection  Stool positive C diff antigen, negative toxins, Negative C. Diff PCR---> not acute C. Diff infection  ID following-- continue oral vancomycin for now  Diarrhea possibly from " tube feedings  Start lactobacillus 2 tabs bid  GI PCR was positive for Salmonella and antibiotics adjusted per ID    Complicated UTI (urinary tract infection)  History of chronic Mathur catheter  Catheter changed on 04/25/24   Urine culture collected 4/23 (+) Klebsiella pneumoniae (/MDRO) and Providencia stuartii  Urine culture collected 4/29 negative so far  Continue ceftazidime-avibactam (Started 4/29)  UA collected 4/29 improved when compared to UA collected 4/25: Bact- few (4/29),Many (4/25)/ Color- yellow (was red 4/25)/ Blood- 1+ (was 3+)/ still with pyuria and 3+ Leuk Est/ Remains Nitrite neg  Awaiting urine culture results    Acute blood loss anemia  Patient's anemia is currently controlled. Has received 2 units of PRBCs on 4/24 . Etiology likely d/t acute blood loss which was from GIB  Current CBC reviewed-   Lab Results   Component Value Date    HGB 8.0 (L) 05/04/2024    HCT 24.0 (L) 05/04/2024     Monitor serial CBC and transfuse if patient becomes hemodynamically unstable, symptomatic or H/H drops below 7/21.    Hyperkalemia  Patient has hypokalemia which is Acute and currently controlled. Most recent potassium levels reviewed-   Lab Results   Component Value Date    K 4.8 05/03/2024   . Will continue potassium replacement per protocol and recheck repeat levels after replacement completed.   -----------------------------------------------------------------------  Potassium currently normal        GI hemorrhage  GI consulted: S/P EGD: moderate/severe distal esophagitis, no bleeding.  Aspirin Eliquis has been resumed  Continue IV Protonix b.i.d.    Seizure disorder  Controlled.  Continue chronic antiepileptic.  Seizure precautions       Sacral decubitus ulcer, stage III  Chronic issue  Wound Care following  Continue current wound care regimen   Continue pressure relief measures      Aphasia  Due to history of CVA  CXR with concern for worsening infiltrates; concern for aspiration; scopolamine patch  ordered for decreased secretions; discussed case with POA in palliative care consulted        VTE Risk Mitigation (From admission, onward)           Ordered     apixaban tablet 2.5 mg  2 times daily         05/01/24 1524     IP VTE HIGH RISK PATIENT  Once         04/25/24 1903     Place sequential compression device  Until discontinued         04/25/24 1903                    Discharge Planning   ALEX: 5/8/2024     Code Status: Full Code   Is the patient medically ready for discharge?:     Reason for patient still in hospital (select all that apply): Patient trending condition, Laboratory test, Treatment, Imaging, Consult recommendations, and Pending disposition  Discharge Plan A: Return to nursing home                  Lizett Scales PA-C  Department of Hospital Medicine   [facility]

## 2024-05-04 NOTE — PROGRESS NOTES
Pharmacokinetic Assessment Follow Up: IV Vancomycin    Vancomycin serum concentration assessment(s):    The trough level was drawn correctly and can be used to guide therapy at this time. The measurement is below the desired definitive target range of 15 to 20 mcg/mL.    Vancomycin Regimen Plan:    Change regimen to Vancomycin 1000 mg IV every 18 hours with next serum trough concentration measured at 5/5/24 prior to 0700 dose on 5/5/24    Drug levels (last 3 results):  Recent Labs   Lab Result Units 05/03/24  1703   Vancomycin-Trough ug/mL 12.0       Pharmacy will continue to follow and monitor vancomycin.    Please contact pharmacy at extension [phone] for questions regarding this assessment.    Thank you for the consult,   Anna Pimentel       Patient brief summary:  Steff Johnson is a 64 y.o. female initiated on antimicrobial therapy with IV Vancomycin for treatment of bacteremia    The patient's current regimen is 750 mg Q24H    Drug Allergies:   Review of patient's allergies indicates:  No Known Allergies    Actual Body Weight:   53.5 K    Renal Function:   Estimated Creatinine Clearance: 60.9 mL/min (based on SCr of 0.7 mg/dL).,     Dialysis Method (if applicable):  N/A    CBC (last 72 hours):  Recent Labs   Lab Result Units 05/01/24  0545 05/01/24  2030 05/02/24  0016 05/02/24  0400 05/02/24  1201 05/03/24  0506   WBC K/uL 21.74*  --   --  23.69*  --  25.39*   Hemoglobin g/dL 8.5* 8.4* 8.2* 8.2*  8.3* 7.9* 8.6*   Hematocrit % 26.6*  --   --  25.0*  --  26.3*   Platelets K/uL 541*  --   --  571*  --  619*   Gran % % 77.0*  --   --  72.0  --  81.0*   Lymph % % 9.0*  --   --  16.0*  --  8.0*   Mono % % 9.0  --   --  3.0*  --  4.0   Eosinophil % % 3.0  --   --  0.0  --  4.0   Basophil % % 0.0  --   --  0.0  --  0.0   Differential Method  Manual  --   --  Manual  --  Manual       Metabolic Panel (last 72 hours):  Recent Labs   Lab Result Units 05/01/24  0546 05/01/24  2030 05/02/24  0400 05/03/24  0506   Sodium  mmol/L 141 139 139 134*   Potassium mmol/L 4.3 4.4 4.8 4.8   Chloride mmol/L 109 107 108 102   CO2 mmol/L 24 23 24 25   Glucose mg/dL 242* 364* 253* 236*   BUN mg/dL 29* 23 24* 23   Creatinine mg/dL 0.7 0.7 0.7 0.7   Albumin g/dL 2.6*  --  2.5* 2.5*   Total Bilirubin mg/dL 0.3  --  0.3 0.3   Alkaline Phosphatase U/L 136*  --  119 132   AST U/L 22  --  26 25   ALT U/L 43  --  39 37   Magnesium mg/dL 1.7  --  1.8 1.7       Vancomycin Administrations:  vancomycin given in the last 96 hours                     vancomycin 750 mg in dextrose 5 % 250 mL IVPB (ready to mix) (mg) 750 mg New Bag 05/02/24 1901     750 mg New Bag 05/01/24 1724    vancomycin 125 mg/5 mL oral solution 125 mg (mg) 125 mg Given 05/02/24 0507     125 mg Given  0025     125 mg Given 05/01/24 1725     125 mg Given  1552     125 mg Given  0547     125 mg Given  0140     125 mg Given 04/30/24 1800    vancomycin in dextrose 5 % 1 gram/250 mL IVPB 1,000 mg (mg) 1,000 mg New Bag 04/30/24 1740    vancomycin 125 mg/5 mL oral solution 125 mg (mg) 125 mg Given 04/30/24 0818                    Microbiologic Results:  Microbiology Results (last 7 days)       Procedure Component Value Units Date/Time    Blood culture [[id number]] Collected: 04/30/24 1729    Order Status: Completed Specimen: Blood from Peripheral, Hand, Left Updated: 05/03/24 1832     Blood Culture, Routine No Growth to date      No Growth to date      No Growth to date      No Growth to date    Blood culture [[id number]] Collected: 04/30/24 1729    Order Status: Completed Specimen: Blood from Peripheral, Hand, Right Updated: 05/03/24 1832     Blood Culture, Routine No Growth to date      No Growth to date      No Growth to date      No Growth to date    Blood culture [[id number]] Collected: 04/29/24 0628    Order Status: Completed Specimen: Blood from Peripheral, Hand, Right Updated: 05/03/24 0832     Blood Culture, Routine No Growth to date      No Growth to date      No Growth to date      No  Growth to date      No Growth to date    Culture, Respiratory with Gram Stain [[id number]] Collected: 05/02/24 1240    Order Status: Completed Specimen: Endotracheal Updated: 05/03/24 0756     Respiratory Culture Reduced Normal Respiratory zach     Gram Stain (Respiratory) >10 epithelial cells per low power field     Gram Stain (Respiratory) No WBC's     Gram Stain (Respiratory) Rare Gram positive cocci    Blood culture [[id number]] Collected: 05/02/24 0016    Order Status: Completed Specimen: Blood from Peripheral, Hand, Left Updated: 05/03/24 0232     Blood Culture, Routine No Growth to date      No Growth to date    Narrative:      x2    Culture, Respiratory with Gram Stain [[id number]] Collected: 05/02/24 1240    Order Status: Completed Specimen: Sputum from Endotracheal Aspirate Updated: 05/02/24 1643     Respiratory Culture Specimen inadequate - culture not performed     Gram Stain (Respiratory) >10 epithelial cells per low power field     Gram Stain (Respiratory) No WBC's     Gram Stain (Respiratory) Rare Gram positive cocci     Gram Stain (Respiratory) Predominance of oropharyngeal zach. Please recollect.    Narrative:      Please have respiratory NT suction for sputum culture    Urine culture [[id number]] Collected: 04/29/24 1058    Order Status: Completed Specimen: Urine Updated: 05/02/24 0721     Urine Culture, Routine No growth    Narrative:      Specimen Source->Urine    MRSA Screen by PCR [[id number]] Collected: 04/30/24 1653    Order Status: Completed Specimen: Nasal Swab Updated: 04/30/24 1959     MRSA SCREEN BY PCR Negative    Respiratory Infection Panel (PCR), Nasopharyngeal [[id number]] Collected: 04/30/24 1728    Order Status: Completed Specimen: Nasopharyngeal Swab Updated: 04/30/24 1844     Respiratory Infection Panel Source NP swab     Adenovirus Not Detected     Coronavirus 229E, Common Cold Virus Not Detected     Coronavirus HKU1, Common Cold Virus Not Detected     Coronavirus NL63, Common  Cold Virus Not Detected     Coronavirus OC43, Common Cold Virus Not Detected     Comment: Coronavirus strains 229E, HKU1, NL63, and OC43 can cause the common   cold   and are not associated with the respiratory disease outbreak caused   by  the COVID-19 (SARS-CoV-2 novel Coronavirus) strain.           SARS-CoV2 (COVID-19) Qualitative PCR Not Detected     Human Metapneumovirus Not Detected     Human Rhinovirus/Enterovirus Not Detected     Influenza A (subtypes H1, H1-2009,H3) Not Detected     Influenza B Not Detected     Parainfluenza Virus 1 Not Detected     Parainfluenza Virus 2 Not Detected     Parainfluenza Virus 3 Not Detected     Parainfluenza Virus 4 Not Detected     Respiratory Syncytial Virus Not Detected     Bordetella Parapertussis (SR3736) Not Detected     Bordetella pertussis (ptxP) Not Detected     Chlamydia pneumoniae Not Detected     Mycoplasma pneumoniae Not Detected     Comment: Respiratory Infection Panel testing performed by Multiplex PCR.       Narrative:      Respiratory Infection Panel source->NP Swab    Blood culture x two cultures. Draw prior to antibiotics. [[id number]]  (Abnormal) Collected: 04/25/24 1223    Order Status: Completed Specimen: Blood Updated: 04/29/24 1124     Blood Culture, Routine Gram stain aer bottle: Gram positive cocci      Results called to and read back by:Octavio Rodriguez RN-ED;  04/26/2024      07:56 CJD      STAPHYLOCOCCUS HAEMOLYTICUS  For susceptibility see order #[id number]      Narrative:      Aerobic and anaerobic    Blood culture x two cultures. Draw prior to antibiotics. [[id number]]  (Abnormal)  (Susceptibility) Collected: 04/25/24 1247    Order Status: Completed Specimen: Blood Updated: 04/29/24 1124     Blood Culture, Routine Gram stain aer bottle: Gram positive cocci      Results called to and read back by:Octavio Rodriguez RN-ED;  04/26/2024      07:35 CJD      STAPHYLOCOCCUS HAEMOLYTICUS    Narrative:      Aerobic and anaerobic    Urine culture  [[id number]]  (Abnormal)  (Susceptibility) Collected: 04/25/24 1434    Order Status: Completed Specimen: Urine Updated: 04/29/24 1051     Urine Culture, Routine PROVIDENCIA STUARTII  > 100,000 cfu/ml        KLEBSIELLA PNEUMONIAE   > 100,000 cfu/ml  MDRO  Results called to and read back by ZONIA Villasenor;    04/28/2024  12:16 CJD       Comment: Previous value was 2015 verified by I/AUT at 06:27 on 04/28/2024       Narrative:      Specimen Source->Urine    Stool culture **cannot be ordered stat** [[id number]] Collected: 04/25/24 1526    Order Status: Completed Specimen: Stool Updated: 04/27/24 1341     Stool Culture No Salmonella,Shigella,Vibrio,Campylobacter.      No E coli 0157:H7 isolated.

## 2024-05-04 NOTE — CARE UPDATE
05/03/24 2015   Patient Assessment/Suction   Level of Consciousness (AVPU) alert   Respiratory Effort Normal;Unlabored   Expansion/Accessory Muscles/Retractions no use of accessory muscles;no retractions   JONATHON Breath Sounds clear   LLL Breath Sounds clear   RUL Breath Sounds clear   RML Breath Sounds clear   RLL Breath Sounds clear   Rhythm/Pattern, Respiratory unlabored;pattern regular;depth regular   Cough Frequency no cough   PRE-TX-O2   Device (Oxygen Therapy) room air   SpO2 (!) 94 %   Pulse Oximetry Type Intermittent   $ Pulse Oximetry - Single Charge Pulse Oximetry - Single   Aerosol Therapy   $ Aerosol Therapy Charges Aerosol Treatment   Daily Review of Necessity (SVN) completed   Respiratory Treatment Status (SVN) given   Treatment Route (SVN) mask;oxygen   Patient Position HOB elevated   Post Treatment Assessment (SVN) breath sounds unchanged   Signs of Intolerance (SVN) none   Education   $ Education Bronchodilator;15 min

## 2024-05-04 NOTE — CARE UPDATE
05/04/24 0830   Patient Assessment/Suction   Level of Consciousness (AVPU) alert   Respiratory Effort Normal;Unlabored   Expansion/Accessory Muscles/Retractions no use of accessory muscles   All Lung Fields Breath Sounds diminished   Rhythm/Pattern, Respiratory depth regular   PRE-TX-O2   Device (Oxygen Therapy) room air   SpO2 (!) 92 %   Pulse Oximetry Type Intermittent   $ Pulse Oximetry - Multiple Charge Pulse Oximetry - Multiple   Pulse 62   Resp 15   Temp 97.7 °F (36.5 °C)   BP (!) 147/66   Aerosol Therapy   $ Aerosol Therapy Charges Aerosol Treatment   Daily Review of Necessity (SVN) completed   Respiratory Treatment Status (SVN) given   Treatment Route (SVN) mask;oxygen   Patient Position HOB elevated   Post Treatment Assessment (SVN) breath sounds unchanged;vital signs unchanged   Signs of Intolerance (SVN) none

## 2024-05-04 NOTE — ASSESSMENT & PLAN NOTE
Blood cultures collected 4/25 (+) Staphylococcus haemolyticus  Repeat blood cultures collected 4/30 and 5/2 negative so far   Continue IV vancomycin

## 2024-05-04 NOTE — PROGRESS NOTES
[facility]   Department of Infectious Disease  Progress  Note        PATIENT NAME: Steff Johnson  MRN: [id number]  TODAY'S DATE: 05/04/2024  ADMIT DATE: 4/25/2024  LOS: 9 days    CHIEF COMPLAINT: GI Problem (Brown fluid being withdrawn from peg tube, vomiting brown fluid. Smells of feces. Since this morning. Pt is nonverbal)    PRINCIPLE PROBLEM: Severe sepsis    REASON FOR CONSULT: UA and c.diffi     INTERVAL HISTORY     5/4:  Patient seen and examined at bedside, nonverbal, just staring at the door.  Hemodynamically stable, remains on O2 by NC.  Still hypertensive, currently normal BP, afebrile.  Stool is less liquid, rectal tube remains in place, adequate urinary output.  Labs reviewed, leukocytosis 24.1, left shift 79%, H&H 8/24, platelet count 675, bands 2%, hyponatremia 133, stable creatinine and liver function tests, will check CRP and procalcitonin tomorrow.  Micro reviewed, so far no growth.    5/2/24@1126 (Danette):  Patient is lying in bed awake and alert though she makes poor eye contact in his nonverbal today.  She continues with a rectal tube but stool seems to be thickening in his not as copious.  She has a very coarse frequent cough but continues on room air.  We have asked for a endotracheal aspirate sputum culture to be done.  Gastrointestinal PCR with salmonella detected.  Discussed with Infectious control practitioner.  4/30 blood cultures no growth to date.  One set of blood cultures from 05/02 no growth to date.  Repeat urine culture on 04/29 no growth to date.  T-max 99.5° in the last 24 hours.  WBC 23.69 trending up, platelets 571 trending up, left shift resolved, 4% bands up from yesterday 1% bands, BUN/CR 24/0.7 with 60.9 estimated creatinine clearance.  BNP 82.  Lactic acid yesterday was    5/1/2024@1228 (Danette):  Patient actually looks a little better today.  When you ask her how she was doing she says all right.  She denies pain.  She still has a rectal tube stool is not  as copious and liquid as it was yesterday.  T-max 99.6° in last 24 hour.  KUB from yesterday with unremarkable bowel gas pattern and patchy hazy opacities in the right lung base possibly atelectasis or infiltrate.  WBC 21.74 trending down, platelets 541 trending up again.  Left shift 77%, bands improved at 1%.  CRP 14.40 trending down, procalcitonin 5.069 trending down.  4/30 blood cultures x2 sets no growth to date.  RVP negative.  MRSA screen negative.  4/29 blood and urine cultures negative.  Echocardiogram with no abnormalities.    4/30/24@1315 (Denette):  Patient seen alone in her room today.  She opens her eyes but does not pay attention.  She does not appear in any distress in his on room air.  She has a rectal tube with a copious amount of liquid stool.  Last chest x-ray from 04/27 with small right pleural effusion and right basilar airspace disease that appears worse than previous with subsegmental atelectasis of the left lung base.  Last fever on 04/28 was 102.2, T-max in the last 24 hours 100, WBC 23.17 still elevated, platelets 501 still elevated, left shift 77% with 4% bands.  Anemia better after blood transfusion at 9.2/28.6.  BUN/CR 35/0.9, creatinine clearance 47.4, alk-phos elevated 165, albumin 2.7, ALT/AST 63/47.  Procalcitonin 8.983 and CRP greater than 16.  Urine culture from 04/25 with Providencia stuartii and Klebsiella pneumoniae /MDRO; 4/25 blood cultures x2 sets with 1 bottle from each set with aerobic bottle positive for staph haemolyticus (vanc ana 2).    04/27/2024.  Dr. Calvo covering for the weekend.  Patient is nonverbal as usual.  I feel like she understands me when I talk to her.  Talking gently and slowly puts her at ease.  She is afebrile at this time.  T-max yesterday was 101.8.  Much better than T-max on admission of 102.3  Blood cultures are showing Staphylococcus species.  Will order repeat blood cultures   Urine cultures are showing Providencia and a LPGNR in progress.   "  Stool cultures no growth to date.    Antibiotics (From admission, onward)      Start     Stop Route Frequency Ordered    05/03/24 1900  vancomycin in dextrose 5 % 1 gram/250 mL IVPB 1,000 mg        Note to Pharmacy: Ht: 4' 11" (1.499 m)  Wt: 53.5 kg (118 lb)  Estimated Creatinine Clearance: 47.4 mL/min (based on SCr of 0.9 mg/dL).  Body mass index is 23.83 kg/m².    -- IV Every 18 hours 05/03/24 1854    05/02/24 1400  levoFLOXacin 750 mg/150 mL IVPB 750 mg         -- IV Every 24 hours (non-standard times) 05/02/24 1250    04/30/24 1653  vancomycin - pharmacy to dose  (vancomycin IVPB (PEDS and ADULTS))        Placed in "And" Linked Group    -- IV pharmacy to manage frequency 04/30/24 1554    04/29/24 0800  cefTAZidime-avibactam (AVYCAZ) 1.25 g in dextrose 5 % (D5W) 100 mL         05/06/24 0759 IV Every 8 hours (non-standard times) 04/29/24 0046    04/25/24 2100  mupirocin 2 % ointment         04/30/24 2059 Nasl 2 times daily 04/25/24 1905          Antifungals (From admission, onward)      None             ASSESSMENT and PLAN     1. Staphylococcus haemolyticus bacteremia, possible skin source?   -in December 2023 patient had Enterococcus faecalis bacteremia, Proteus mirabilis ESBL bacteremia both from urinary source  -4/25 blood cultures x2 sets with aerobic bottle from each set positive for staph haemolyticus - sensitive to Bactrim and vancomycin (LEAH 2)  -4/29 blood culture x1 set no growth to date, pending   -procalcitonin 8.983, CRP>16-->14.40   -4/30 leukocytosis, initially 24.95, peaked at 28.44 on 04/28, 23.17 on 04/30, left shift, 4% band    2. Catheter associated UTI.    -previously grew ESBL Proteus in December 2023  -Catheter changed on 04/25/24.  -4/25 urine culture with Providencia stuartii and Klebsiella pneumoniae /MDRO   -4/29 urine culture no growth to date    3. Diarrhea with Positive C diff antigen with negative PCR and toxin assay   Stool cultures negative, C diff antigen positive, toxin and " "PCR negative   Salmonella on GI PCR positive on 05/02    4. Acute kidney injury - improved  -initial BUN/CR 84/2.7; followed by Nephrology  -4/30 BUN/CR 35/0.9 - current creatinine clearance 47.4  -5/1 creatinine has improved and is at baseline 0.7, creatinine clearance 60.9    5. Acute on chronic anemia  -given given 2 units PRBCs, managed by hospitalist    6. Pneumonia versus pulmonary edema  -initial CT chest abdomen pelvis with scattered nonspecific bronchiolitis and right lower lobe opacities with chronic bronchopneumonia similar to previous from December 2023  -chest x-rays with minimal improvement  -procalcitonin elevated 8.983-->5.0690    7. Chronic medical problems including CVA, aphasia, dysphagia with PEG tube, chronic indwelling Mathur catheter, seizures      RECOMMENDATIONS:    Continue levofloxacin 750 mg IV daily for Salmonella gastroenteritis and pneumoniae   Continue Avycaz 1.25 g IV q.8 for 7 days through May 5th to cover Providencia stuartii and  Klebsiella pneumoniae  Continue vancomycin 125 mg p.o. twice a day prophylactically for C diff  Continue vancomycin with pharmacy to dose - for staph haemolyticus bacteremia, last level 12, subtherapeutic  CRP and procalcitonin order with a.m. labs   Monitor stool output   Monitor O2 sats  Aspiration precautions    D/w Hospitalist/PA    Antibiotics (From admission, onward)      Start     Stop Route Frequency Ordered    05/03/24 1900  vancomycin in dextrose 5 % 1 gram/250 mL IVPB 1,000 mg        Note to Pharmacy: Ht: 4' 11" (1.499 m)  Wt: 53.5 kg (118 lb)  Estimated Creatinine Clearance: 47.4 mL/min (based on SCr of 0.9 mg/dL).  Body mass index is 23.83 kg/m².    -- IV Every 18 hours 05/03/24 1854    05/02/24 1400  levoFLOXacin 750 mg/150 mL IVPB 750 mg         -- IV Every 24 hours (non-standard times) 05/02/24 1250    04/30/24 1653  vancomycin - pharmacy to dose  (vancomycin IVPB (PEDS and ADULTS))        Placed in "And" Linked Group    -- IV pharmacy " to manage frequency 04/30/24 1554    04/29/24 0800  cefTAZidime-avibactam (AVYCAZ) 1.25 g in dextrose 5 % (D5W) 100 mL         05/06/24 0759 IV Every 8 hours (non-standard times) 04/29/24 0046    04/25/24 2100  mupirocin 2 % ointment         04/30/24 2059 Nasl 2 times daily 04/25/24 1905          Antifungals (From admission, onward)      None           Antivirals (From admission, onward)      None              Review of patient's allergies indicates:  No Known Allergies        SUBJECTIVE     Review of systems: Unable to obtain      OBJECTIVE   Temp:  [97.7 °F (36.5 °C)-99.7 °F (37.6 °C)] 98.5 °F (36.9 °C)  Pulse:  [52-88] 82  Resp:  [14-20] 18  SpO2:  [92 %-97 %] 92 %  BP: (130-198)/(62-85) 137/85  Temp:  [97.7 °F (36.5 °C)-99.7 °F (37.6 °C)]   Temp: 98.5 °F (36.9 °C) (05/04/24 1122)  Pulse: 82 (05/04/24 1122)  Resp: 18 (05/04/24 1122)  BP: 137/85 (05/04/24 1122)  SpO2: (!) 92 % (05/04/24 1122)    Intake/Output Summary (Last 24 hours) at 5/4/2024 1247  Last data filed at 5/4/2024 1056  Gross per 24 hour   Intake 750 ml   Output 2420 ml   Net -1670 ml     Physical Exam  General:  Awake and alert, nonverbal today, does not maintain eye contact.  Eyes: Eyes with no icterus or injection.  No exudates.  Ears:  Hearing seemed grossly intact, she will turn her head to voice but is poorly attentive.  Mouth:  Moist mucous membranes, no thrush noted.  Cardiovascular: Regular rate and rhythm, no murmurs, no significant edema  Respiratory:  Clear upper lobes, lower lobes with crackles, on room air, coarse congested sounding cough.  Gastrointestinal:  Soft and obese with active bowel sounds, no distention.  Peg tube with no redness or drainage at PEG site.  Rectal tube with thickening diarrhea  Genitourinary:  Urinary catheter with clear yellow urine, rectal tube in place minimal stop stool noted in tube  Musculoskeletal:  No spontaneous movement, heel protectors in place.   Skin: Pale, warm and dry.   Neuro: poorly attentive,  does not follow commands, nonverbal  Psych:  Calm, not cooperative but not agitated.  VAD:  PIV  ISOLATION:  Contact isolation    Wounds:   4/30                        Significant Labs: All pertinent labs within the past 24 hours have been reviewed.    CBC LAST 7  Recent Labs   Lab 04/28/24  0455 04/29/24  0628 04/30/24  0649 05/01/24  0545 05/01/24  2030 05/02/24  0016 05/02/24  0400 05/02/24  1201 05/03/24  0506 05/04/24  0536   WBC 28.44* 26.35* 23.17* 21.74*  --   --  23.69*  --  25.39* 24.18*   RBC 3.74* 3.13* 3.41* 3.11*  --   --  2.89*  --  3.10* 2.88*   HGB 10.3* 8.6* 9.2* 8.5* 8.4* 8.2* 8.2*  8.3* 7.9* 8.6* 8.0*   HCT 30.6* 26.0* 28.6* 26.6*  --   --  25.0*  --  26.3* 24.0*   MCV 82 83 84 86  --   --  87  --  85 83   MCH 27.5 27.5 27.0 27.3  --   --  28.4  --  27.7 27.8   MCHC 33.7 33.1 32.2 32.0  --   --  32.8  --  32.7 33.3   RDW 17.5* 18.2* 18.6* 18.6*  --   --  18.5*  --  18.2* 17.8*   * 498* 501* 541*  --   --  571*  --  619* 675*   MPV 10.6 10.5 11.1 10.7  --   --  10.6  --  10.4 10.4   GRAN 86.6*  24.6* 80.7*  21.3* 77.0* 77.0*  --   --  72.0  --  81.0* 79.0*   LYMPH 5.0*  1.4 5.9*  1.6 6.0* 9.0*  --   --  16.0*  --  8.0*  CANCELED 9.0*   MONO 5.7  1.6* 7.9  2.1* 6.0 9.0  --   --  3.0*  --  4.0  CANCELED 3.0*   BASO 0.13 0.10  --   --   --   --   --   --  CANCELED  --    NRBC 0 0 0 0  --   --  0  --  0 0       CHEMISTRY LAST 7  Recent Labs   Lab 04/28/24  0455 04/28/24  1333 04/28/24  1758 04/29/24  0628 04/30/24  0649 05/01/24  0546 05/01/24  2030 05/02/24  0400 05/03/24  0506 05/04/24  0536     --   --  142 140 141 139 139 134* 133*   K 3.4*   < >  --  4.6 4.7 4.3 4.4 4.8 4.8 4.6     --   --  110 109 109 107 108 102 102   CO2 25  --   --  26 24 24 23 24 25 22*   ANIONGAP 10  --   --  6* 7* 8 9 7* 7* 9   BUN 56*  --   --  39* 35* 29* 23 24* 23 25*   CREATININE 1.4  --   --  1.0 0.9 0.7 0.7 0.7 0.7 0.9   *  --   --  209* 247* 242* 364* 253* 236* 188*   CALCIUM 9.2   "--   --  8.8 8.8 8.6* 8.4* 8.5* 8.5* 8.3*   MG 1.8  --  1.7 1.6 1.8 1.7  --  1.8 1.7 1.9   ALBUMIN 2.9*  --   --  2.7* 2.7* 2.6*  --  2.5* 2.5* 2.6*   PROT 7.4  --   --  6.8 7.0 6.7  --  6.4 6.5 6.3   ALKPHOS 152*  --   --  147* 165* 136*  --  119 132 132   ALT 86*  --   --  60* 63* 43  --  39 37 27   *  --   --  51* 47* 22  --  26 25 16   BILITOT 0.6  --   --  0.4 0.3 0.3  --  0.3 0.3 0.3    < > = values in this interval not displayed.       Estimated Creatinine Clearance: 47.4 mL/min (based on SCr of 0.9 mg/dL).    INFLAMMATORY/PROCAL  LAST 7  Recent Labs   Lab 04/29/24  0628 04/30/24  0649 05/01/24  0546 05/03/24  0506   PROCAL  --  8.983* 5.069* 1.694*   CRP >16.00*  --  14.40* 8.00*     No results found for: "ESR"  CRP   Date Value Ref Range Status   05/03/2024 8.00 (H) <1.00 mg/dL Final     Comment:     CRP-Normal Application expected values:   <1.0        mg/dL   Normal Range  1.0 - 5.0  mg/dL   Indicates mild inflammation  5.0 - 10.0 mg/dL   Indicates severe inflammation  >10.0        mg/dL   Represents serious processes and   frequently         indicates the presence of a bacterial   infection.      05/01/2024 14.40 (H) <1.00 mg/dL Final     Comment:     CRP-Normal Application expected values:   <1.0        mg/dL   Normal Range  1.0 - 5.0  mg/dL   Indicates mild inflammation  5.0 - 10.0 mg/dL   Indicates severe inflammation  >10.0        mg/dL   Represents serious processes and   frequently         indicates the presence of a bacterial   infection.      04/29/2024 >16.00 (H) <1.00 mg/dL Final     Comment:     CRP-Normal Application expected values:   <1.0        mg/dL   Normal Range  1.0 - 5.0  mg/dL   Indicates mild inflammation  5.0 - 10.0 mg/dL   Indicates severe inflammation  >10.0        mg/dL   Represents serious processes and   frequently         indicates the presence of a bacterial   infection.      12/11/2023 55.1 (H) 0.0 - 8.2 mg/L Final   12/09/2023 143.3 (H) 0.0 - 8.2 mg/L Final "   12/06/2023 293.0 (H) 0.0 - 8.2 mg/L Final   10/25/2023 92.6 (H) 0.0 - 8.2 mg/L Final       PRIOR  MICROBIOLOGY:  Reviewed    Susceptibility data from last 90 days.  Collected Specimen Info Organism Amp/Sulbactam Ampicillin Cefepime Ceftriaxone Ciprofloxacin Clindamycin Ertapenem Erythromycin Gentamicin Levofloxacin Meropenem Nitrofurantoin Oxacillin Penicillin   04/29/24 Blood from Peripheral, Hand, Right No growth after 5 days.                 04/25/24 Urine Providencia stuartii  I   S  S  R   S   R  R  S        KLEBSIELLA PNEUMONIAE   R  R  R  R  R   R   S  I  R  R     04/25/24 Blood Staphylococcus haemolyticus     R   R   R      R  R   04/25/24 Blood Staphylococcus haemolyticus                 02/23/24 Blood from Peripheral, Antecubital, Left No growth after 5 days.                 02/23/24 Blood from Peripheral, Antecubital, Left No growth after 5 days.                 02/23/24 Urine Candida tropicalis                   Collected Specimen Info Organism PIPERACILLIN/TAZO SUSCEPTIBILITY Tetracycline Tobramycin Trimeth/Sulfa Vancomycin   04/29/24 Blood from Peripheral, Hand, Right No growth after 5 days.        04/25/24 Urine Providencia stuartii  S   R  S      KLEBSIELLA PNEUMONIAE   R  R  I  R    04/25/24 Blood Staphylococcus haemolyticus   R   S  S   04/25/24 Blood Staphylococcus haemolyticus        02/23/24 Blood from Peripheral, Antecubital, Left No growth after 5 days.        02/23/24 Blood from Peripheral, Antecubital, Left No growth after 5 days.        02/23/24 Urine Candida tropicalis              LAST 7 DAYS MICROBIOLOGY   Microbiology Results (last 7 days)       Procedure Component Value Units Date/Time    Culture, Respiratory with Gram Stain [[id number]] Collected: 05/02/24 1240    Order Status: Completed Specimen: Endotracheal Updated: 05/04/24 0932     Respiratory Culture Reduced Normal Respiratory zach     Gram Stain (Respiratory) >10 epithelial cells per low power field     Gram Stain  (Respiratory) No WBC's     Gram Stain (Respiratory) Rare Gram positive cocci    Blood culture [[id number]] Collected: 04/29/24 0628    Order Status: Completed Specimen: Blood from Peripheral, Hand, Right Updated: 05/04/24 0832     Blood Culture, Routine No growth after 5 days.    Blood culture [[id number]] Collected: 05/02/24 0016    Order Status: Completed Specimen: Blood from Peripheral, Hand, Left Updated: 05/04/24 0232     Blood Culture, Routine No Growth to date      No Growth to date      No Growth to date    Narrative:      x2    Blood culture [[id number]] Collected: 04/30/24 1729    Order Status: Completed Specimen: Blood from Peripheral, Hand, Left Updated: 05/03/24 1832     Blood Culture, Routine No Growth to date      No Growth to date      No Growth to date      No Growth to date    Blood culture [[id number]] Collected: 04/30/24 1729    Order Status: Completed Specimen: Blood from Peripheral, Hand, Right Updated: 05/03/24 1832     Blood Culture, Routine No Growth to date      No Growth to date      No Growth to date      No Growth to date    Culture, Respiratory with Gram Stain [[id number]] Collected: 05/02/24 1240    Order Status: Completed Specimen: Sputum from Endotracheal Aspirate Updated: 05/02/24 1643     Respiratory Culture Specimen inadequate - culture not performed     Gram Stain (Respiratory) >10 epithelial cells per low power field     Gram Stain (Respiratory) No WBC's     Gram Stain (Respiratory) Rare Gram positive cocci     Gram Stain (Respiratory) Predominance of oropharyngeal zach. Please recollect.    Narrative:      Please have respiratory NT suction for sputum culture    Urine culture [[id number]] Collected: 04/29/24 1058    Order Status: Completed Specimen: Urine Updated: 05/02/24 0721     Urine Culture, Routine No growth    Narrative:      Specimen Source->Urine    MRSA Screen by PCR [[id number]] Collected: 04/30/24 1653    Order Status: Completed Specimen: Nasal Swab Updated:  04/30/24 1959     MRSA SCREEN BY PCR Negative    Respiratory Infection Panel (PCR), Nasopharyngeal [[id number]] Collected: 04/30/24 1728    Order Status: Completed Specimen: Nasopharyngeal Swab Updated: 04/30/24 1844     Respiratory Infection Panel Source NP swab     Adenovirus Not Detected     Coronavirus 229E, Common Cold Virus Not Detected     Coronavirus HKU1, Common Cold Virus Not Detected     Coronavirus NL63, Common Cold Virus Not Detected     Coronavirus OC43, Common Cold Virus Not Detected     Comment: Coronavirus strains 229E, HKU1, NL63, and OC43 can cause the common   cold   and are not associated with the respiratory disease outbreak caused   by  the COVID-19 (SARS-CoV-2 novel Coronavirus) strain.           SARS-CoV2 (COVID-19) Qualitative PCR Not Detected     Human Metapneumovirus Not Detected     Human Rhinovirus/Enterovirus Not Detected     Influenza A (subtypes H1, H1-2009,H3) Not Detected     Influenza B Not Detected     Parainfluenza Virus 1 Not Detected     Parainfluenza Virus 2 Not Detected     Parainfluenza Virus 3 Not Detected     Parainfluenza Virus 4 Not Detected     Respiratory Syncytial Virus Not Detected     Bordetella Parapertussis (FC3113) Not Detected     Bordetella pertussis (ptxP) Not Detected     Chlamydia pneumoniae Not Detected     Mycoplasma pneumoniae Not Detected     Comment: Respiratory Infection Panel testing performed by Multiplex PCR.       Narrative:      Respiratory Infection Panel source->NP Swab    Blood culture x two cultures. Draw prior to antibiotics. [[id number]]  (Abnormal) Collected: 04/25/24 1223    Order Status: Completed Specimen: Blood Updated: 04/29/24 1124     Blood Culture, Routine Gram stain aer bottle: Gram positive cocci      Results called to and read back by:Octavio Rodriguez RN-ED;  04/26/2024      07:56 CJD      STAPHYLOCOCCUS HAEMOLYTICUS  For susceptibility see order #[id number]      Narrative:      Aerobic and anaerobic    Blood culture x two  cultures. Draw prior to antibiotics. [[id number]]  (Abnormal)  (Susceptibility) Collected: 04/25/24 1247    Order Status: Completed Specimen: Blood Updated: 04/29/24 1124     Blood Culture, Routine Gram stain aer bottle: Gram positive cocci      Results called to and read back by:Octavio Rodriguez RN-ED;  04/26/2024      07:35 CJD      STAPHYLOCOCCUS HAEMOLYTICUS    Narrative:      Aerobic and anaerobic    Urine culture [[id number]]  (Abnormal)  (Susceptibility) Collected: 04/25/24 1434    Order Status: Completed Specimen: Urine Updated: 04/29/24 1051     Urine Culture, Routine PROVIDENCIA STUARTII  > 100,000 cfu/ml        KLEBSIELLA PNEUMONIAE   > 100,000 cfu/ml  MDRO  Results called to and read back by Miracle Callahan RN-[facility];    04/28/2024  12:16 CJD       Comment: Previous value was 2015 verified by I/AUT at 06:27 on 04/28/2024       Narrative:      Specimen Source->Urine    Stool culture **cannot be ordered stat** [[id number]] Collected: 04/25/24 1526    Order Status: Completed Specimen: Stool Updated: 04/27/24 1341     Stool Culture No Salmonella,Shigella,Vibrio,Campylobacter.      No E coli 0157:H7 isolated.          CURRENT/PREVIOUS VISIT EKG  Results for orders placed or performed during the hospital encounter of 04/25/24   EKG 12-lead    Collection Time: 04/30/24  2:38 PM   Result Value Ref Range    QRS Duration 62 ms    [facility] QTC Calculation 442 ms    Narrative    Test Reason : R07.9,    Vent. Rate : 079 BPM     Atrial Rate : 079 BPM     P-R Int : 140 ms          QRS Dur : 062 ms      QT Int : 386 ms       P-R-T Axes : 043 013 042 degrees     QTc Int : 442 ms    Normal sinus rhythm  Normal ECG  When compared with ECG of 25-APR-2024 12:25,  No significant change was found    Referred By: [email or web address]   SELF           Confirmed By:        Echocardiography Findings 4/30/24    Left Ventricle The left ventricle is normal in size. Normal wall thickness. Normal wall motion. There is normal systolic function with a  visually estimated ejection fraction of 65 - 70%. There is normal diastolic function.   Right Ventricle Normal right ventricular cavity size. Wall thickness is normal. Right ventricle wall motion  is normal. Systolic function is normal.   Left Atrium Normal left atrial size.   Right Atrium Normal right atrial size.   Aortic Valve The aortic valve is structurally normal. There is normal leaflet mobility. Aortic valve peak velocity is 1.40 m/s. Mean gradient is 4 mmHg.   Mitral Valve The mitral valve is structurally normal. There is normal leaflet mobility. The mean pressure gradient across the mitral valve is 2 mmHg at a heart rate of  bpm. There is trace regurgitation.   Tricuspid Valve The tricuspid valve is structurally normal. There is normal leaflet mobility. There is trace regurgitation.   Pulmonic Valve The pulmonic valve is structurally normal. There is normal leaflet mobility.   IVC/SVC Normal venous pressure at 3 mmHg.   Ascending Aorta Aortic root is normal in size. Ascending aorta is normal measuring 2.60 cm.   Pericardium and Other Findings There is a trivial effusion. No indication of cardiac tamponade. Evidence includes no chamber collapse.   LVAD RV/LV ratio is 0.50.       Significant Imaging: I have reviewed all relevant and available imaging results/findings within the past 24 hours.    X-Ray Chest AP Portable 04/25/24 1323    Mild faint nonspecific infrahilar interstitial opacity, new from the previous exam suggesting very mild aspiration/bronchitis/pneumonia or trace edema in the appropriate clinical setting.    X-Ray Chest AP Portable 04/25/24 1504     Interval insertion of a nasogastric tube as described.    CT Chest Abdomen Pelvis Without Contrast  04/25/24 1610   1. Scattered nonspecific bronchiolitis in both lungs, with right lower lobes opacities potentially reflecting chronic bronchopneumonia, given similar appearance to CT of 12/11/2023.   2. Additional stable observations as  described.    X-Ray Chest AP Portable 04/25/24 1815   No significant interval detrimental change in the radiographic appearance of the chest as compared with the prior exam on the same date, 04/25/2024.  Nasogastric tube in place.    X-Ray Chest AP Portable 04/26/24 1128    Mild hazy opacification of the right lung base with blunting of the costophrenic angle.  This could reflect atelectasis, infiltrate, pleural effusion or combination there of.    X-Ray Chest AP Portable 04/27/24 1059   Small right pleural effusion and right basilar airspace disease, worsened compared to prior.   Subsegmental atelectasis left lung base.   Interval removal of enteric tube.    X-Ray Abdomen AP 1 View04/30/24 1605   1. Unremarkable bowel gas pattern.   2. Percutaneous gastrostomy tube in the epigastric region.   3. Patchy hazy opacities of the right lung base could reflect atelectasis or infiltrate.      Rae Patel MD  Date of Service: 05/04/2024      This note was created using [employer] voice recognition software that occasionally misinterpreted phrases or words.

## 2024-05-04 NOTE — ASSESSMENT & PLAN NOTE
Chronic.    Temp:  [97.7 °F (36.5 °C)-99.7 °F (37.6 °C)]   Pulse:  [52-88]   Resp:  [14-18]   BP: (130-198)/(62-85)   SpO2:  [92 %-97 %] .   Home meds for hypertension were reviewed and noted below.   Hypertension Medications               amLODIPine (NORVASC) 10 MG tablet 1 tablet (10 mg total) by Per G Tube route once daily.    furosemide (LASIX) 40 MG tablet 40 mg by Per G Tube route once daily.    losartan (COZAAR) 100 MG tablet 1 tablet (100 mg total) by Per G Tube route once daily.    metoprolol tartrate (LOPRESSOR) 25 MG tablet 25 mg by Per G Tube route 2 (two) times daily.    cloNIDine (CATAPRES) 0.1 MG tablet 1 tablet (0.1 mg total) by Per G Tube route 3 (three) times daily.          Will order clonidine 0.1 q8h prn only in case pt went into rebound HTN.5/1: pt consistently hypertensive added home norvasc and metoprolol

## 2024-05-04 NOTE — ASSESSMENT & PLAN NOTE
Due to history of CVA  CXR with concern for worsening infiltrates; concern for aspiration; scopolamine patch ordered for decreased secretions; discussed case with POA in palliative care consulted

## 2024-05-05 LAB
ALBUMIN SERPL BCP-MCNC: 2.7 G/DL (ref 3.5–5.2)
ALP SERPL-CCNC: 134 U/L (ref 55–135)
ALT SERPL W/O P-5'-P-CCNC: 25 U/L (ref 10–44)
ANION GAP SERPL CALC-SCNC: 8 MMOL/L (ref 8–16)
ANISOCYTOSIS BLD QL SMEAR: SLIGHT
AST SERPL-CCNC: 20 U/L (ref 10–40)
BACTERIA BLD CULT: NORMAL
BACTERIA BLD CULT: NORMAL
BACTERIA SPEC AEROBE CULT: NORMAL
BASOPHILS # BLD AUTO: 0.07 K/UL (ref 0–0.2)
BASOPHILS NFR BLD: 0.3 % (ref 0–1.9)
BILIRUB SERPL-MCNC: 0.3 MG/DL (ref 0.1–1)
BUN SERPL-MCNC: 26 MG/DL (ref 8–23)
CALCIUM SERPL-MCNC: 8.6 MG/DL (ref 8.7–10.5)
CHLORIDE SERPL-SCNC: 104 MMOL/L (ref 95–110)
CO2 SERPL-SCNC: 24 MMOL/L (ref 23–29)
CREAT SERPL-MCNC: 0.9 MG/DL (ref 0.5–1.4)
CRP SERPL-MCNC: 6.8 MG/DL
DIFFERENTIAL METHOD BLD: ABNORMAL
EOSINOPHIL # BLD AUTO: 0.3 K/UL (ref 0–0.5)
EOSINOPHIL NFR BLD: 1.3 % (ref 0–8)
ERYTHROCYTE [DISTWIDTH] IN BLOOD BY AUTOMATED COUNT: 18.6 % (ref 11.5–14.5)
EST. GFR  (NO RACE VARIABLE): >60 ML/MIN/1.73 M^2
GLUCOSE SERPL-MCNC: 198 MG/DL (ref 70–110)
GLUCOSE SERPL-MCNC: 202 MG/DL (ref 70–110)
GLUCOSE SERPL-MCNC: 220 MG/DL (ref 70–110)
GLUCOSE SERPL-MCNC: 240 MG/DL (ref 70–110)
GLUCOSE SERPL-MCNC: 265 MG/DL (ref 70–110)
GRAM STN SPEC: NORMAL
HCT VFR BLD AUTO: 25.4 % (ref 37–48.5)
HGB BLD-MCNC: 8.1 G/DL (ref 12–16)
HYPOCHROMIA BLD QL SMEAR: ABNORMAL
IMM GRANULOCYTES # BLD AUTO: 0.89 K/UL (ref 0–0.04)
IMM GRANULOCYTES NFR BLD AUTO: 4.3 % (ref 0–0.5)
LYMPHOCYTES # BLD AUTO: 1.3 K/UL (ref 1–4.8)
LYMPHOCYTES NFR BLD: 6.2 % (ref 18–48)
MAGNESIUM SERPL-MCNC: 2 MG/DL (ref 1.6–2.6)
MCH RBC QN AUTO: 27 PG (ref 27–31)
MCHC RBC AUTO-ENTMCNC: 31.9 G/DL (ref 32–36)
MCV RBC AUTO: 85 FL (ref 82–98)
MONOCYTES # BLD AUTO: 1.2 K/UL (ref 0.3–1)
MONOCYTES NFR BLD: 5.8 % (ref 4–15)
NEUTROPHILS # BLD AUTO: 17 K/UL (ref 1.8–7.7)
NEUTROPHILS NFR BLD: 82.1 % (ref 38–73)
NRBC BLD-RTO: 0 /100 WBC
PLATELET # BLD AUTO: 739 K/UL (ref 150–450)
PLATELET BLD QL SMEAR: ABNORMAL
PMV BLD AUTO: 10.7 FL (ref 9.2–12.9)
POIKILOCYTOSIS BLD QL SMEAR: SLIGHT
POTASSIUM SERPL-SCNC: 4.5 MMOL/L (ref 3.5–5.1)
PROCALCITONIN SERPL IA-MCNC: 1.01 NG/ML (ref 0–0.5)
PROT SERPL-MCNC: 6.6 G/DL (ref 6–8.4)
RBC # BLD AUTO: 3 M/UL (ref 4–5.4)
SODIUM SERPL-SCNC: 136 MMOL/L (ref 136–145)
TARGETS BLD QL SMEAR: ABNORMAL
VANCOMYCIN TROUGH SERPL-MCNC: 22.1 UG/ML
WBC # BLD AUTO: 20.71 K/UL (ref 3.9–12.7)

## 2024-05-05 PROCEDURE — 63600175 PHARM REV CODE 636 W HCPCS: Performed by: HOSPITALIST

## 2024-05-05 PROCEDURE — 25000003 PHARM REV CODE 250: Performed by: NURSE PRACTITIONER

## 2024-05-05 PROCEDURE — 99900031 HC PATIENT EDUCATION (STAT)

## 2024-05-05 PROCEDURE — 12000002 HC ACUTE/MED SURGE SEMI-PRIVATE ROOM

## 2024-05-05 PROCEDURE — 25000003 PHARM REV CODE 250: Performed by: HOSPITALIST

## 2024-05-05 PROCEDURE — 63600175 PHARM REV CODE 636 W HCPCS: Mod: JZ,JG | Performed by: NURSE PRACTITIONER

## 2024-05-05 PROCEDURE — 84145 PROCALCITONIN (PCT): CPT | Performed by: STUDENT IN AN ORGANIZED HEALTH CARE EDUCATION/TRAINING PROGRAM

## 2024-05-05 PROCEDURE — 80202 ASSAY OF VANCOMYCIN: CPT | Performed by: STUDENT IN AN ORGANIZED HEALTH CARE EDUCATION/TRAINING PROGRAM

## 2024-05-05 PROCEDURE — 25000003 PHARM REV CODE 250: Performed by: INTERNAL MEDICINE

## 2024-05-05 PROCEDURE — 86140 C-REACTIVE PROTEIN: CPT | Performed by: STUDENT IN AN ORGANIZED HEALTH CARE EDUCATION/TRAINING PROGRAM

## 2024-05-05 PROCEDURE — C9113 INJ PANTOPRAZOLE SODIUM, VIA: HCPCS | Performed by: INTERNAL MEDICINE

## 2024-05-05 PROCEDURE — 94761 N-INVAS EAR/PLS OXIMETRY MLT: CPT

## 2024-05-05 PROCEDURE — 25000242 PHARM REV CODE 250 ALT 637 W/ HCPCS: Performed by: HOSPITALIST

## 2024-05-05 PROCEDURE — 63600175 PHARM REV CODE 636 W HCPCS: Performed by: INTERNAL MEDICINE

## 2024-05-05 PROCEDURE — 25000003 PHARM REV CODE 250

## 2024-05-05 PROCEDURE — 63600175 PHARM REV CODE 636 W HCPCS: Performed by: NURSE PRACTITIONER

## 2024-05-05 PROCEDURE — 80053 COMPREHEN METABOLIC PANEL: CPT | Performed by: HOSPITALIST

## 2024-05-05 PROCEDURE — 83735 ASSAY OF MAGNESIUM: CPT | Performed by: HOSPITALIST

## 2024-05-05 PROCEDURE — 94640 AIRWAY INHALATION TREATMENT: CPT

## 2024-05-05 PROCEDURE — 36415 COLL VENOUS BLD VENIPUNCTURE: CPT | Performed by: STUDENT IN AN ORGANIZED HEALTH CARE EDUCATION/TRAINING PROGRAM

## 2024-05-05 PROCEDURE — 85025 COMPLETE CBC W/AUTO DIFF WBC: CPT | Performed by: HOSPITALIST

## 2024-05-05 RX ORDER — VANCOMYCIN HCL IN 5 % DEXTROSE 1G/250ML
1000 PLASTIC BAG, INJECTION (ML) INTRAVENOUS
Status: DISCONTINUED | OUTPATIENT
Start: 2024-05-05 | End: 2024-05-08 | Stop reason: HOSPADM

## 2024-05-05 RX ADMIN — INSULIN ASPART 6 UNITS: 100 INJECTION, SOLUTION INTRAVENOUS; SUBCUTANEOUS at 06:05

## 2024-05-05 RX ADMIN — LEVETIRACETAM 500 MG: 5 INJECTION INTRAVENOUS at 09:05

## 2024-05-05 RX ADMIN — METOPROLOL TARTRATE 25 MG: 25 TABLET, FILM COATED ORAL at 08:05

## 2024-05-05 RX ADMIN — IPRATROPIUM BROMIDE AND ALBUTEROL SULFATE 3 ML: 2.5; .5 SOLUTION RESPIRATORY (INHALATION) at 01:05

## 2024-05-05 RX ADMIN — INSULIN ASPART 4 UNITS: 100 INJECTION, SOLUTION INTRAVENOUS; SUBCUTANEOUS at 08:05

## 2024-05-05 RX ADMIN — APIXABAN 2.5 MG: 2.5 TABLET, FILM COATED ORAL at 09:05

## 2024-05-05 RX ADMIN — ATORVASTATIN CALCIUM 80 MG: 40 TABLET, FILM COATED ORAL at 08:05

## 2024-05-05 RX ADMIN — LEVETIRACETAM 500 MG: 5 INJECTION INTRAVENOUS at 08:05

## 2024-05-05 RX ADMIN — IPRATROPIUM BROMIDE AND ALBUTEROL SULFATE 3 ML: 2.5; .5 SOLUTION RESPIRATORY (INHALATION) at 08:05

## 2024-05-05 RX ADMIN — LATANOPROST 1 DROP: 50 SOLUTION/ DROPS OPHTHALMIC at 08:05

## 2024-05-05 RX ADMIN — ASPIRIN 81 MG 81 MG: 81 TABLET ORAL at 09:05

## 2024-05-05 RX ADMIN — APIXABAN 2.5 MG: 2.5 TABLET, FILM COATED ORAL at 08:05

## 2024-05-05 RX ADMIN — THIAMINE HCL TAB 100 MG 100 MG: 100 TAB at 09:05

## 2024-05-05 RX ADMIN — METOPROLOL TARTRATE 25 MG: 25 TABLET, FILM COATED ORAL at 09:05

## 2024-05-05 RX ADMIN — PANTOPRAZOLE SODIUM 40 MG: 40 INJECTION, POWDER, FOR SOLUTION INTRAVENOUS at 09:05

## 2024-05-05 RX ADMIN — VANCOMYCIN HYDROCHLORIDE 1000 MG: 1 INJECTION, POWDER, LYOPHILIZED, FOR SOLUTION INTRAVENOUS at 02:05

## 2024-05-05 RX ADMIN — LEVOFLOXACIN 750 MG: 5 INJECTION, SOLUTION INTRAVENOUS at 02:05

## 2024-05-05 RX ADMIN — CEFTAZIDIME, AVIBACTAM 1.25 G: 2; .5 POWDER, FOR SOLUTION INTRAVENOUS at 01:05

## 2024-05-05 RX ADMIN — ALLOPURINOL 100 MG: 100 TABLET ORAL at 09:05

## 2024-05-05 RX ADMIN — AMLODIPINE BESYLATE 10 MG: 5 TABLET ORAL at 09:05

## 2024-05-05 RX ADMIN — CEFTAZIDIME, AVIBACTAM 1.25 G: 2; .5 POWDER, FOR SOLUTION INTRAVENOUS at 09:05

## 2024-05-05 RX ADMIN — FOLIC ACID 1000 MCG: 1 TABLET ORAL at 09:05

## 2024-05-05 RX ADMIN — HYDROCODONE BITARTRATE AND ACETAMINOPHEN 1 TABLET: 5; 325 TABLET ORAL at 09:05

## 2024-05-05 RX ADMIN — ACETAMINOPHEN 650 MG: 325 TABLET ORAL at 08:05

## 2024-05-05 RX ADMIN — PANTOPRAZOLE SODIUM 40 MG: 40 INJECTION, POWDER, FOR SOLUTION INTRAVENOUS at 08:05

## 2024-05-05 RX ADMIN — IPRATROPIUM BROMIDE AND ALBUTEROL SULFATE 3 ML: 2.5; .5 SOLUTION RESPIRATORY (INHALATION) at 07:05

## 2024-05-05 RX ADMIN — INSULIN ASPART 4 UNITS: 100 INJECTION, SOLUTION INTRAVENOUS; SUBCUTANEOUS at 02:05

## 2024-05-05 RX ADMIN — CEFTAZIDIME, AVIBACTAM 1.25 G: 2; .5 POWDER, FOR SOLUTION INTRAVENOUS at 06:05

## 2024-05-05 NOTE — ASSESSMENT & PLAN NOTE
Patient's anemia is currently controlled. Has received 2 units of PRBCs on 4/24 . Etiology likely d/t acute blood loss which was from GIB  Current CBC reviewed-   Lab Results   Component Value Date    HGB 8.1 (L) 05/05/2024    HCT 25.4 (L) 05/05/2024     Monitor serial CBC and transfuse if patient becomes hemodynamically unstable, symptomatic or H/H drops below 7/21.

## 2024-05-05 NOTE — CARE UPDATE
05/05/24 0753   Patient Assessment/Suction   Level of Consciousness (AVPU) alert   Respiratory Effort Normal;Unlabored   All Lung Fields Breath Sounds diminished   Rhythm/Pattern, Respiratory unlabored;pattern regular;depth regular   PRE-TX-O2   Device (Oxygen Therapy) room air   SpO2 (!) 94 %   Pulse Oximetry Type Intermittent   $ Pulse Oximetry - Multiple Charge Pulse Oximetry - Multiple   Pulse 85   Resp 14   Aerosol Therapy   $ Aerosol Therapy Charges Aerosol Treatment   Daily Review of Necessity (SVN) completed   Respiratory Treatment Status (SVN) given   Treatment Route (SVN) mask;oxygen   Patient Position HOB elevated   Post Treatment Assessment (SVN) breath sounds unchanged;vital signs unchanged   Signs of Intolerance (SVN) none

## 2024-05-05 NOTE — PROGRESS NOTES
[facility] Medicine  Progress Note    Patient Name: Steff Johnson  MRN: [id number]  Patient Class: IP- Inpatient   Admission Date: 4/25/2024  Length of Stay: 10 days  Attending Physician: Jose A Escoto MD  Primary Care Provider: Cristina Ren MD        Subjective:     Principal Problem:Severe sepsis        HPI:  63 yo female with PMH of CVA with known baseline confusion and aphasia who was sent to the ER because of black drainage coming from around the PEG tube site and having black vomit.     On presentation to the ER, vitals showed 101/59, HR of 107, RR of 25 and fever of 102.3, spo2 of 95%. Cbc with wbc of 28 and anemia of 8.8. CMP showed acute renal failure with creatinine of of 2.5, anion gap of 19, hyponatermia of 133, potassium of 5.9. lactic acid of 6.1. Pt started to have projectile hematemesis. NGT was placed. Mathur was replaced after which pt developed hematuria. UA was grossly positive. C.diffi antigen is positive. EKG showed sinus tachycardia. CT C/A/P showed scattered nonspecific bronchiolitis in both lungs, with right lower lobes opacities potentially reflecting chronic bronchopneumonia, given similar appearance to CT of 12/11/2023. Pt was given the hyperkalemia procotol, 1.6 L of fluids, vanco/zosyn, and protonix 80 mg iv. She will be admitted to medicine for further management.     Overview/Hospital Course:  Ms. Johnson is a resident of a local nursing home she was admitted for GI hemorrhage, severe sepsis, COURTNEY, UTI, and C. Diff. while here, her labs and vital signs were monitored and she was maintained on telemetry.  He was placed on IV vancomycin, Merrem, and vancomycin per PEG while awaiting culture results.  Infectious Disease was consulted to assist with management.  She has a chronic Mathur catheter, which was replaced, and she initially developed hematuria, likely from the trauma of the Mathur catheter being inserted, which did resolve.  She was placed on IV  Protonix and GI was consulted.  She underwent an EGD which was negative for any bleeding and showed moderate/severe distal esophagitis and non-erosive gastritis.  Her anticoagulants, aspirin and Eliquis, were then resumed.  She initially had an NG tube, which was discontinued at some point.  Her continuous tube feedings via PEG were resume and she has tolerated.  She was placed on IV fluids her COURTNEY and her renal function improved.  Her urine culture started grew out Klebsiella (/MDRO) and Providencia stuartii and the Merrem was discontinued and she was started on ceftazidime-avibactam.  Her blood cultures were also positive with Staphylococcus haemolyticus and she was continued on IV vancomycin.  Her stool studies were negative for acute C diff infection however she continued on vancomycin per PEG empirically due to her risk for developing C diff. Repeat urine and blood cultures were collected.  He continued to have loose stools, which may have been due to the continuous tube feedings, and she was started on acidophilus per PEG.  She has a chronic sacral decubitus and Wound Care was consulted and followed her for her wound management.  Her wound did not appear to be contributory to her sepsis.                Interval History:  Patient seen and examined. Overnight notes reviewed.  WBC increased on am labs. Afebrile.IV abx. Repeat blood cultures from 04/30 and 5/2 with NGTD. ID following. Leukocytosis persists on am labs. Discussed case with patient's Leonie CISNEROS via telephone. Palliative care consulted.      Review of Systems   Unable to perform ROS: Patient nonverbal     Objective:     Vital Signs (Most Recent):  Temp: 99.1 °F (37.3 °C) (05/05/24 1135)  Pulse: 78 (05/05/24 1349)  Resp: 16 (05/05/24 1349)  BP: (!) 157/65 (05/05/24 1135)  SpO2: (!) 94 % (05/05/24 1349) Vital Signs (24h Range):  Temp:  [97.8 °F (36.6 °C)-99.6 °F (37.6 °C)] 99.1 °F (37.3 °C)  Pulse:  [78-88] 78  Resp:  [14-18] 16  SpO2:  [93 %-100 %]  94 %  BP: (134-180)/(63-82) 157/65     Weight: 53.5 kg (118 lb)  Body mass index is 23.83 kg/m².    Intake/Output Summary (Last 24 hours) at 5/5/2024 1537  Last data filed at 5/5/2024 1201  Gross per 24 hour   Intake 1899 ml   Output 2250 ml   Net -351 ml         Physical Exam  Vitals and nursing note reviewed.   Constitutional:       Appearance: Normal appearance. She is normal weight. She is ill-appearing (chronically).   HENT:      Head: Normocephalic and atraumatic.      Mouth/Throat:      Mouth: Mucous membranes are moist.      Pharynx: Oropharynx is clear.   Eyes:      Extraocular Movements: Extraocular movements intact.   Cardiovascular:      Rate and Rhythm: Normal rate and regular rhythm.      Pulses: Normal pulses.      Heart sounds: Normal heart sounds.   Pulmonary:      Effort: Pulmonary effort is normal.      Breath sounds: Decreased breath sounds present.   Abdominal:      General: Abdomen is flat. Bowel sounds are normal.      Palpations: Abdomen is soft.      Tenderness: There is no abdominal tenderness. There is no guarding or rebound.      Comments: G tube in place.    Genitourinary:     Comments: Mathur catheter in place.   Rectal tube with stool noted.   Neurological:      Mental Status: She is alert. Mental status is at baseline.      Comments: Patient was alert and more interactive today.  Able to mouth yes or no to simple questions.             Significant Labs: All pertinent labs within the past 24 hours have been reviewed.  CBC:   Recent Labs   Lab 05/04/24  0536 05/05/24  0637   WBC 24.18* 20.71*   HGB 8.0* 8.1*   HCT 24.0* 25.4*   * 739*     CMP:   Recent Labs   Lab 05/04/24  0536 05/05/24  0637   * 136   K 4.6 4.5    104   CO2 22* 24   * 198*   BUN 25* 26*   CREATININE 0.9 0.9   CALCIUM 8.3* 8.6*   PROT 6.3 6.6   ALBUMIN 2.6* 2.7*   BILITOT 0.3 0.3   ALKPHOS 132 134   AST 16 20   ALT 27 25   ANIONGAP 9 8       Significant Imaging: I have reviewed all pertinent  "imaging results/findings within the past 24 hours.    CXR:  Bilateral lower lung opacities suspicious for multifocal pneumonia, right greater than left, having progressed compared to prior exams.     Assessment/Plan:      * Severe sepsis  This patient does have evidence of infective focus  My overall impression is  severe sepsis :  Evidenced by leukocytosis, fever, current infection, and lactic acidosis.  Source: Urinary Tract  Antibiotics given-   Antibiotics (72h ago, onward)      Start     Stop Route Frequency Ordered    05/05/24 1400  vancomycin in dextrose 5 % 1 gram/250 mL IVPB 1,000 mg        Note to Pharmacy: Ht: 4' 11" (1.499 m)  Wt: 53.5 kg (118 lb)  Estimated Creatinine Clearance: 47.4 mL/min (based on SCr of 0.9 mg/dL).  Body mass index is 23.83 kg/m².    -- IV Every 24 hours (non-standard times) 05/05/24 0830    05/02/24 1400  levoFLOXacin 750 mg/150 mL IVPB 750 mg         -- IV Every 24 hours (non-standard times) 05/02/24 1250    04/30/24 1653  vancomycin - pharmacy to dose  (vancomycin IVPB (PEDS and ADULTS))        Placed in "And" Linked Group    -- IV pharmacy to manage frequency 04/30/24 1554    04/29/24 0800  cefTAZidime-avibactam (AVYCAZ) 1.25 g in dextrose 5 % (D5W) 100 mL         05/06/24 0959 IV Every 8 hours (non-standard times) 04/29/24 0046          Latest lactate reviewed-  No results for input(s): "LACTATE", "POCLAC" in the last 72 hours.    Organ dysfunction indicated by Acute kidney injury-- which has resolved    Fluid challenge Not needed - patient is not hypotensive      Post- resuscitation assessment No - Post resuscitation assessment not needed       Will Not start Pressors- Levophed for MAP of 65  Source control achieved by:  IV antibiotics  ---------------------------------------------------------------  It is noted that patient has history of recurrent episodes of sepsis due to complicated UTI.  Awaiting urine culture results  Procalcitonin 8.983.  Not sure if this level is " improved since an initial level was not obtained when patient was admitted.  Highest Procalcitonin was 133 with patient's last episode of sepsis 12/2/23.  Lactic acidosis resolved  Urine culture positive Klebsiella (/MDRO) and Providencia stuartii/ repeat urine culture negative so far  Blood cultures collected 4/25 (+) Staphylococcus haemolyticus/ repeat blood cultures collected 4/29 negative so far  Stool negative for acute C diff infection  Continue IV vancomycin and ceftazidime-avibactam   Continue IV fluid hydration  Fevers are slowly improving. Had temp 202.2 4/28 @ 2245.  First dose of ceftazidime-avibactam given 4/29 @ 0035.  T-max since starting new antibiotic was 100.9 @ 4/29 0420.  Last antipyretic given 4/29 @2130.  GI PCR + for salmonella and abx adjusted to vanc, avycaz, and Levaquin 5/2.   5/3: WBC increasing on am labs; CXR with worsening infiltrates; discussed case with the patient's POA at length; will consult palliative Care; at this time POA wishes for patient's remained full code  5/4: leukocytosis; ID following; IV abx; discussed case with pt's brother in person and POA via phone.   5/5: leukocytosis; ID following; on IV vanc, Avycaz, and Levaquin; palliative care consult; updated patient's POA on phone.     Bacteremia  Blood cultures collected 4/25 (+) Staphylococcus haemolyticus  Repeat blood cultures collected 4/30 and 5/2 negative so far   Continue IV vancomycin      Encephalopathy chronic        Quadriplegia  Chronic/due to history of CVA  Continue pressure relief measures      History of ischemic stroke  Continue prevention: statin, ASA, Eliquis     Acute kidney injury  Essentially resolved/creatinine normal.    Patient with acute kidney injury/acute renal failure likely due to pre-renal azotemia due to IVVD 2/2 GIB and Sepsis. COURTNEY is currently stable. Baseline creatinine  0.7  - Labs reviewed- Renal function/electrolytes with Estimated Creatinine Clearance: 60.9 mL/min (based on SCr of  "0.7 mg/dL). according to latest data. Monitor urine output and serial BMP and adjust therapy as needed. Avoid nephrotoxins and renally dose meds for GFR listed above.      Type 2 diabetes mellitus with neurologic complication, with long-term current use of insulin  Patient's FSGs are uncontrolled due to hyperglycemia on current medication regimen.  Last A1c reviewed-   Lab Results   Component Value Date    HGBA1C 7.8 (H) 12/05/2023     Most recent fingerstick glucose reviewed- No results for input(s): "POCTGLUCOSE" in the last 24 hours.  Current correctional scale  Low  Increase anti-hyperglycemic dose as follows-   Antihyperglycemics (From admission, onward)      Start     Stop Route Frequency Ordered    04/30/24 2100  insulin detemir U-100 (Levemir) pen 18 Units         -- SubQ Nightly 04/30/24 1550    04/30/24 1649  insulin aspart U-100 pen 0-10 Units         -- SubQ Every 6 hours PRN 04/30/24 1550          Hold Oral hypoglycemics while patient is in the hospital.  -----------------------------------------------  Increase basal insulin to 18 units nightly and Increase SSI to medium dose      Hypertension  Chronic.    Temp:  [97.8 °F (36.6 °C)-99.6 °F (37.6 °C)]   Pulse:  [78-88]   Resp:  [14-18]   BP: (134-180)/(63-82)   SpO2:  [93 %-100 %] .   Home meds for hypertension were reviewed and noted below.   Hypertension Medications               amLODIPine (NORVASC) 10 MG tablet 1 tablet (10 mg total) by Per G Tube route once daily.    furosemide (LASIX) 40 MG tablet 40 mg by Per G Tube route once daily.    losartan (COZAAR) 100 MG tablet 1 tablet (100 mg total) by Per G Tube route once daily.    metoprolol tartrate (LOPRESSOR) 25 MG tablet 25 mg by Per G Tube route 2 (two) times daily.    cloNIDine (CATAPRES) 0.1 MG tablet 1 tablet (0.1 mg total) by Per G Tube route 3 (three) times daily.          Will order clonidine 0.1 q8h prn only in case pt went into rebound HTN.5/1: pt consistently hypertensive added home " norvasc and metoprolol     Clostridium difficile infection  Stool positive C diff antigen, negative toxins, Negative C. Diff PCR---> not acute C. Diff infection  ID following-- continue oral vancomycin for now  Diarrhea possibly from tube feedings  Start lactobacillus 2 tabs bid  GI PCR was positive for Salmonella and antibiotics adjusted per ID    Complicated UTI (urinary tract infection)  History of chronic Mathur catheter  Catheter changed on 04/25/24   Urine culture collected 4/23 (+) Klebsiella pneumoniae (/MDRO) and Providencia stuartii  Urine culture collected 4/29 negative so far  Continue ceftazidime-avibactam (Started 4/29)  UA collected 4/29 improved when compared to UA collected 4/25: Bact- few (4/29),Many (4/25)/ Color- yellow (was red 4/25)/ Blood- 1+ (was 3+)/ still with pyuria and 3+ Leuk Est/ Remains Nitrite neg  Repeat urine negative    Acute blood loss anemia  Patient's anemia is currently controlled. Has received 2 units of PRBCs on 4/24 . Etiology likely d/t acute blood loss which was from GIB  Current CBC reviewed-   Lab Results   Component Value Date    HGB 8.1 (L) 05/05/2024    HCT 25.4 (L) 05/05/2024     Monitor serial CBC and transfuse if patient becomes hemodynamically unstable, symptomatic or H/H drops below 7/21.    Hyperkalemia  Patient has hypokalemia which is Acute and currently controlled. Most recent potassium levels reviewed-   Lab Results   Component Value Date    K 4.8 05/03/2024   . Will continue potassium replacement per protocol and recheck repeat levels after replacement completed.   -----------------------------------------------------------------------  Potassium currently normal        GI hemorrhage  GI consulted: S/P EGD: moderate/severe distal esophagitis, no bleeding.  Aspirin Eliquis has been resumed  Continue IV Protonix b.i.d.    Seizure disorder  Controlled.  Continue chronic antiepileptic.  Seizure precautions       Sacral decubitus ulcer, stage III  Chronic  issue  Wound Care following  Continue current wound care regimen   Continue pressure relief measures      Aphasia  Due to history of CVA  CXR with concern for worsening infiltrates; concern for aspiration; scopolamine patch ordered for decreased secretions; discussed case with POA and palliative care consulted        VTE Risk Mitigation (From admission, onward)           Ordered     apixaban tablet 2.5 mg  2 times daily         05/01/24 1524     IP VTE HIGH RISK PATIENT  Once         04/25/24 1903     Place sequential compression device  Until discontinued         04/25/24 1903                    Discharge Planning   ALEX: 5/8/2024     Code Status: Full Code   Is the patient medically ready for discharge?:     Reason for patient still in hospital (select all that apply): Patient trending condition, Laboratory test, Treatment, Consult recommendations, and Pending disposition  Discharge Plan A: Return to nursing home                  Lizett Scales PA-C  Department of Hospital Medicine   [facility]

## 2024-05-05 NOTE — ASSESSMENT & PLAN NOTE
Due to history of CVA  CXR with concern for worsening infiltrates; concern for aspiration; scopolamine patch ordered for decreased secretions; discussed case with POA and palliative care consulted

## 2024-05-05 NOTE — ASSESSMENT & PLAN NOTE
"This patient does have evidence of infective focus  My overall impression is  severe sepsis :  Evidenced by leukocytosis, fever, current infection, and lactic acidosis.  Source: Urinary Tract  Antibiotics given-   Antibiotics (72h ago, onward)      Start     Stop Route Frequency Ordered    05/05/24 1400  vancomycin in dextrose 5 % 1 gram/250 mL IVPB 1,000 mg        Note to Pharmacy: Ht: 4' 11" (1.499 m)  Wt: 53.5 kg (118 lb)  Estimated Creatinine Clearance: 47.4 mL/min (based on SCr of 0.9 mg/dL).  Body mass index is 23.83 kg/m².    -- IV Every 24 hours (non-standard times) 05/05/24 0830    05/02/24 1400  levoFLOXacin 750 mg/150 mL IVPB 750 mg         -- IV Every 24 hours (non-standard times) 05/02/24 1250    04/30/24 1653  vancomycin - pharmacy to dose  (vancomycin IVPB (PEDS and ADULTS))        Placed in "And" Linked Group    -- IV pharmacy to manage frequency 04/30/24 1554    04/29/24 0800  cefTAZidime-avibactam (AVYCAZ) 1.25 g in dextrose 5 % (D5W) 100 mL         05/06/24 0959 IV Every 8 hours (non-standard times) 04/29/24 0046          Latest lactate reviewed-  No results for input(s): "LACTATE", "POCLAC" in the last 72 hours.    Organ dysfunction indicated by Acute kidney injury-- which has resolved    Fluid challenge Not needed - patient is not hypotensive      Post- resuscitation assessment No - Post resuscitation assessment not needed       Will Not start Pressors- Levophed for MAP of 65  Source control achieved by:  IV antibiotics  ---------------------------------------------------------------  It is noted that patient has history of recurrent episodes of sepsis due to complicated UTI.  Awaiting urine culture results  Procalcitonin 8.983.  Not sure if this level is improved since an initial level was not obtained when patient was admitted.  Highest Procalcitonin was 133 with patient's last episode of sepsis 12/2/23.  Lactic acidosis resolved  Urine culture positive Klebsiella (/MDRO) and Providencia " stuartii/ repeat urine culture negative so far  Blood cultures collected 4/25 (+) Staphylococcus haemolyticus/ repeat blood cultures collected 4/29 negative so far  Stool negative for acute C diff infection  Continue IV vancomycin and ceftazidime-avibactam   Continue IV fluid hydration  Fevers are slowly improving. Had temp 202.2 4/28 @ 2245.  First dose of ceftazidime-avibactam given 4/29 @ 0035.  T-max since starting new antibiotic was 100.9 @ 4/29 0420.  Last antipyretic given 4/29 @2130.  GI PCR + for salmonella and abx adjusted to vanc, avycaz, and Levaquin 5/2.   5/3: WBC increasing on am labs; CXR with worsening infiltrates; discussed case with the patient's POA at length; will consult palliative Care; at this time POA wishes for patient's remained full code  5/4: leukocytosis; ID following; IV abx; discussed case with pt's brother in person and POA via phone.   5/5: leukocytosis; ID following; on IV vanc, Avycaz, and Levaquin; palliative care consult; updated patient's POA on phone.

## 2024-05-05 NOTE — SUBJECTIVE & OBJECTIVE
Interval History:  Patient seen and examined. Overnight notes reviewed.  WBC increased on am labs. Afebrile.IV abx. Repeat blood cultures from 04/30 and 5/2 with NGTD. ID following. Leukocytosis persists on am labs. Discussed case with patient's Leonie CISNEROS via telephone. Palliative care consulted.      Review of Systems   Unable to perform ROS: Patient nonverbal     Objective:     Vital Signs (Most Recent):  Temp: 99.1 °F (37.3 °C) (05/05/24 1135)  Pulse: 78 (05/05/24 1349)  Resp: 16 (05/05/24 1349)  BP: (!) 157/65 (05/05/24 1135)  SpO2: (!) 94 % (05/05/24 1349) Vital Signs (24h Range):  Temp:  [97.8 °F (36.6 °C)-99.6 °F (37.6 °C)] 99.1 °F (37.3 °C)  Pulse:  [78-88] 78  Resp:  [14-18] 16  SpO2:  [93 %-100 %] 94 %  BP: (134-180)/(63-82) 157/65     Weight: 53.5 kg (118 lb)  Body mass index is 23.83 kg/m².    Intake/Output Summary (Last 24 hours) at 5/5/2024 1537  Last data filed at 5/5/2024 1201  Gross per 24 hour   Intake 1899 ml   Output 2250 ml   Net -351 ml         Physical Exam  Vitals and nursing note reviewed.   Constitutional:       Appearance: Normal appearance. She is normal weight. She is ill-appearing (chronically).   HENT:      Head: Normocephalic and atraumatic.      Mouth/Throat:      Mouth: Mucous membranes are moist.      Pharynx: Oropharynx is clear.   Eyes:      Extraocular Movements: Extraocular movements intact.   Cardiovascular:      Rate and Rhythm: Normal rate and regular rhythm.      Pulses: Normal pulses.      Heart sounds: Normal heart sounds.   Pulmonary:      Effort: Pulmonary effort is normal.      Breath sounds: Decreased breath sounds present.   Abdominal:      General: Abdomen is flat. Bowel sounds are normal.      Palpations: Abdomen is soft.      Tenderness: There is no abdominal tenderness. There is no guarding or rebound.      Comments: G tube in place.    Genitourinary:     Comments: Mathur catheter in place.   Rectal tube with stool noted.   Neurological:      Mental Status: She is  alert. Mental status is at baseline.      Comments: Patient was alert and more interactive today.  Able to mouth yes or no to simple questions.             Significant Labs: All pertinent labs within the past 24 hours have been reviewed.  CBC:   Recent Labs   Lab 05/04/24  0536 05/05/24  0637   WBC 24.18* 20.71*   HGB 8.0* 8.1*   HCT 24.0* 25.4*   * 739*     CMP:   Recent Labs   Lab 05/04/24  0536 05/05/24  0637   * 136   K 4.6 4.5    104   CO2 22* 24   * 198*   BUN 25* 26*   CREATININE 0.9 0.9   CALCIUM 8.3* 8.6*   PROT 6.3 6.6   ALBUMIN 2.6* 2.7*   BILITOT 0.3 0.3   ALKPHOS 132 134   AST 16 20   ALT 27 25   ANIONGAP 9 8       Significant Imaging: I have reviewed all pertinent imaging results/findings within the past 24 hours.    CXR:  Bilateral lower lung opacities suspicious for multifocal pneumonia, right greater than left, having progressed compared to prior exams.

## 2024-05-05 NOTE — ASSESSMENT & PLAN NOTE
Chronic.    Temp:  [97.8 °F (36.6 °C)-99.6 °F (37.6 °C)]   Pulse:  [78-88]   Resp:  [14-18]   BP: (134-180)/(63-82)   SpO2:  [93 %-100 %] .   Home meds for hypertension were reviewed and noted below.   Hypertension Medications               amLODIPine (NORVASC) 10 MG tablet 1 tablet (10 mg total) by Per G Tube route once daily.    furosemide (LASIX) 40 MG tablet 40 mg by Per G Tube route once daily.    losartan (COZAAR) 100 MG tablet 1 tablet (100 mg total) by Per G Tube route once daily.    metoprolol tartrate (LOPRESSOR) 25 MG tablet 25 mg by Per G Tube route 2 (two) times daily.    cloNIDine (CATAPRES) 0.1 MG tablet 1 tablet (0.1 mg total) by Per G Tube route 3 (three) times daily.          Will order clonidine 0.1 q8h prn only in case pt went into rebound HTN.5/1: pt consistently hypertensive added home norvasc and metoprolol

## 2024-05-05 NOTE — PROGRESS NOTES
Pharmacokinetic Assessment Follow Up: IV Vancomycin    Vancomycin serum concentration assessment(s):    The trough level was drawn correctly and can be used to guide therapy at this time. The measurement is above the desired definitive target range of 15 to 20 mcg/mL.    Vancomycin Regimen Plan:    Change Vancomycin to 1 gm every 24 hours with next trough on 5/7 at 13:00.    Drug levels (last 3 results):  Recent Labs   Lab Result Units 05/03/24  1703 05/05/24  0637   Vancomycin-Trough ug/mL 12.0 22.1*       Pharmacy will continue to follow and monitor vancomycin.    Please contact pharmacy at extension [phone] for questions regarding this assessment.    Thank you for the consult,   Cira Garcia       Patient brief summary:  Steff Johnson is a 64 y.o. female initiated on antimicrobial therapy with IV Vancomycin for treatment of bacteremia    The patient's current regimen is 1 gm every 24 hours    Drug Allergies:   Review of patient's allergies indicates:  No Known Allergies    Actual Body Weight:   53.5 kg    Renal Function:   Estimated Creatinine Clearance: 47.4 mL/min (based on SCr of 0.9 mg/dL).,     Dialysis Method (if applicable):  N/A    CBC (last 72 hours):  Recent Labs   Lab Result Units 05/02/24  1201 05/03/24  0506 05/04/24  0536 05/05/24  0637   WBC K/uL  --  25.39* 24.18* 20.71*   Hemoglobin g/dL 7.9* 8.6* 8.0* 8.1*   Hematocrit %  --  26.3* 24.0* 25.4*   Platelets K/uL  --  619* 675* 739*   Gran % %  --  81.0* 79.0*  --    Lymph % %  --  8.0* 9.0*  --    Mono % %  --  4.0 3.0*  --    Eosinophil % %  --  4.0 3.0  --    Basophil % %  --  0.0 0.0  --    Differential Method   --  Manual Manual  --        Metabolic Panel (last 72 hours):  Recent Labs   Lab Result Units 05/03/24  0506 05/04/24  0536 05/05/24  0637   Sodium mmol/L 134* 133* 136   Potassium mmol/L 4.8 4.6 4.5   Chloride mmol/L 102 102 104   CO2 mmol/L 25 22* 24   Glucose mg/dL 236* 188* 198*   BUN mg/dL 23 25* 26*   Creatinine mg/dL 0.7  0.9 0.9   Albumin g/dL 2.5* 2.6* 2.7*   Total Bilirubin mg/dL 0.3 0.3 0.3   Alkaline Phosphatase U/L 132 132 134   AST U/L 25 16 20   ALT U/L 37 27 25   Magnesium mg/dL 1.7 1.9 2.0       Vancomycin Administrations:  vancomycin given in the last 96 hours                     vancomycin in dextrose 5 % 1 gram/250 mL IVPB 1,000 mg (mg) 1,000 mg New Bag 05/04/24 1332     1,000 mg New Bag 05/03/24 1924    vancomycin 750 mg in dextrose 5 % 250 mL IVPB (ready to mix) (mg) 750 mg New Bag 05/02/24 1901     750 mg New Bag 05/01/24 1724    vancomycin 125 mg/5 mL oral solution 125 mg (mg) 125 mg Given 05/02/24 0507     125 mg Given  0025     125 mg Given 05/01/24 1725     125 mg Given  1552                    Microbiologic Results:  Microbiology Results (last 7 days)       Procedure Component Value Units Date/Time    Blood culture [[id number]] Collected: 05/02/24 0016    Order Status: Completed Specimen: Blood from Peripheral, Hand, Left Updated: 05/05/24 0232     Blood Culture, Routine No Growth to date      No Growth to date      No Growth to date      No Growth to date    Narrative:      x2    Blood culture [[id number]] Collected: 04/30/24 1729    Order Status: Completed Specimen: Blood from Peripheral, Hand, Left Updated: 05/04/24 1832     Blood Culture, Routine No Growth to date      No Growth to date      No Growth to date      No Growth to date      No Growth to date    Blood culture [[id number]] Collected: 04/30/24 1729    Order Status: Completed Specimen: Blood from Peripheral, Hand, Right Updated: 05/04/24 1832     Blood Culture, Routine No Growth to date      No Growth to date      No Growth to date      No Growth to date      No Growth to date    Culture, Respiratory with Gram Stain [[id number]] Collected: 05/02/24 1240    Order Status: Completed Specimen: Endotracheal Updated: 05/04/24 0932     Respiratory Culture Reduced Normal Respiratory zach     Gram Stain (Respiratory) >10 epithelial cells per low power field      Gram Stain (Respiratory) No WBC's     Gram Stain (Respiratory) Rare Gram positive cocci    Blood culture [[id number]] Collected: 04/29/24 0628    Order Status: Completed Specimen: Blood from Peripheral, Hand, Right Updated: 05/04/24 0832     Blood Culture, Routine No growth after 5 days.    Culture, Respiratory with Gram Stain [[id number]] Collected: 05/02/24 1240    Order Status: Completed Specimen: Sputum from Endotracheal Aspirate Updated: 05/02/24 1643     Respiratory Culture Specimen inadequate - culture not performed     Gram Stain (Respiratory) >10 epithelial cells per low power field     Gram Stain (Respiratory) No WBC's     Gram Stain (Respiratory) Rare Gram positive cocci     Gram Stain (Respiratory) Predominance of oropharyngeal zach. Please recollect.    Narrative:      Please have respiratory NT suction for sputum culture    Urine culture [[id number]] Collected: 04/29/24 1058    Order Status: Completed Specimen: Urine Updated: 05/02/24 0721     Urine Culture, Routine No growth    Narrative:      Specimen Source->Urine    MRSA Screen by PCR [[id number]] Collected: 04/30/24 1653    Order Status: Completed Specimen: Nasal Swab Updated: 04/30/24 1959     MRSA SCREEN BY PCR Negative    Respiratory Infection Panel (PCR), Nasopharyngeal [[id number]] Collected: 04/30/24 1728    Order Status: Completed Specimen: Nasopharyngeal Swab Updated: 04/30/24 1844     Respiratory Infection Panel Source NP swab     Adenovirus Not Detected     Coronavirus 229E, Common Cold Virus Not Detected     Coronavirus HKU1, Common Cold Virus Not Detected     Coronavirus NL63, Common Cold Virus Not Detected     Coronavirus OC43, Common Cold Virus Not Detected     Comment: Coronavirus strains 229E, HKU1, NL63, and OC43 can cause the common   cold   and are not associated with the respiratory disease outbreak caused   by  the COVID-19 (SARS-CoV-2 novel Coronavirus) strain.           SARS-CoV2 (COVID-19) Qualitative PCR Not Detected      Human Metapneumovirus Not Detected     Human Rhinovirus/Enterovirus Not Detected     Influenza A (subtypes H1, H1-2009,H3) Not Detected     Influenza B Not Detected     Parainfluenza Virus 1 Not Detected     Parainfluenza Virus 2 Not Detected     Parainfluenza Virus 3 Not Detected     Parainfluenza Virus 4 Not Detected     Respiratory Syncytial Virus Not Detected     Bordetella Parapertussis (MP3223) Not Detected     Bordetella pertussis (ptxP) Not Detected     Chlamydia pneumoniae Not Detected     Mycoplasma pneumoniae Not Detected     Comment: Respiratory Infection Panel testing performed by Multiplex PCR.       Narrative:      Respiratory Infection Panel source->NP Swab    Blood culture x two cultures. Draw prior to antibiotics. [[id number]]  (Abnormal) Collected: 04/25/24 1223    Order Status: Completed Specimen: Blood Updated: 04/29/24 1124     Blood Culture, Routine Gram stain aer bottle: Gram positive cocci      Results called to and read back by:Octavio Rodriguez RN-ED;  04/26/2024      07:56 CJD      STAPHYLOCOCCUS HAEMOLYTICUS  For susceptibility see order #[id number]      Narrative:      Aerobic and anaerobic    Blood culture x two cultures. Draw prior to antibiotics. [[id number]]  (Abnormal)  (Susceptibility) Collected: 04/25/24 1247    Order Status: Completed Specimen: Blood Updated: 04/29/24 1124     Blood Culture, Routine Gram stain aer bottle: Gram positive cocci      Results called to and read back by:VERONICA OrtegaED;  04/26/2024      07:35 CJD      STAPHYLOCOCCUS HAEMOLYTICUS    Narrative:      Aerobic and anaerobic    Urine culture [[id number]]  (Abnormal)  (Susceptibility) Collected: 04/25/24 1434    Order Status: Completed Specimen: Urine Updated: 04/29/24 1051     Urine Culture, Routine PROVIDENCIA STUARTII  > 100,000 cfu/ml        KLEBSIELLA PNEUMONIAE   > 100,000 cfu/ml  MDRO  Results called to and read back by ZONIA Villasenor;    04/28/2024  12:16 CJD       Comment:  Previous value was 2015 verified by I/AUT at 06:27 on 04/28/2024       Narrative:      Specimen Source->Urine

## 2024-05-05 NOTE — ASSESSMENT & PLAN NOTE
History of chronic Mathur catheter  Catheter changed on 04/25/24   Urine culture collected 4/23 (+) Klebsiella pneumoniae (/MDRO) and Providencia stuartii  Urine culture collected 4/29 negative so far  Continue ceftazidime-avibactam (Started 4/29)  UA collected 4/29 improved when compared to UA collected 4/25: Bact- few (4/29),Many (4/25)/ Color- yellow (was red 4/25)/ Blood- 1+ (was 3+)/ still with pyuria and 3+ Leuk Est/ Remains Nitrite neg  Repeat urine negative

## 2024-05-06 PROBLEM — D72.823: Status: ACTIVE | Noted: 2024-05-06

## 2024-05-06 LAB
ALBUMIN SERPL BCP-MCNC: 2.6 G/DL (ref 3.5–5.2)
ALP SERPL-CCNC: 126 U/L (ref 55–135)
ALT SERPL W/O P-5'-P-CCNC: 25 U/L (ref 10–44)
ANION GAP SERPL CALC-SCNC: 8 MMOL/L (ref 8–16)
ANISOCYTOSIS BLD QL SMEAR: SLIGHT
AST SERPL-CCNC: 21 U/L (ref 10–40)
BASOPHILS # BLD AUTO: ABNORMAL K/UL (ref 0–0.2)
BASOPHILS NFR BLD: 0 % (ref 0–1.9)
BILIRUB SERPL-MCNC: 0.2 MG/DL (ref 0.1–1)
BUN SERPL-MCNC: 24 MG/DL (ref 8–23)
CALCIUM SERPL-MCNC: 8.5 MG/DL (ref 8.7–10.5)
CHLORIDE SERPL-SCNC: 105 MMOL/L (ref 95–110)
CO2 SERPL-SCNC: 23 MMOL/L (ref 23–29)
CREAT SERPL-MCNC: 0.9 MG/DL (ref 0.5–1.4)
DIFFERENTIAL METHOD BLD: ABNORMAL
EOSINOPHIL # BLD AUTO: ABNORMAL K/UL (ref 0–0.5)
EOSINOPHIL NFR BLD: 0 % (ref 0–8)
ERYTHROCYTE [DISTWIDTH] IN BLOOD BY AUTOMATED COUNT: 18.6 % (ref 11.5–14.5)
EST. GFR  (NO RACE VARIABLE): >60 ML/MIN/1.73 M^2
GLUCOSE SERPL-MCNC: 152 MG/DL (ref 70–110)
GLUCOSE SERPL-MCNC: 159 MG/DL (ref 70–110)
GLUCOSE SERPL-MCNC: 188 MG/DL (ref 70–110)
GLUCOSE SERPL-MCNC: 195 MG/DL (ref 70–110)
GLUCOSE SERPL-MCNC: 232 MG/DL (ref 70–110)
HCT VFR BLD AUTO: 24.4 % (ref 37–48.5)
HGB BLD-MCNC: 7.9 G/DL (ref 12–16)
HYPOCHROMIA BLD QL SMEAR: ABNORMAL
IMM GRANULOCYTES # BLD AUTO: ABNORMAL K/UL (ref 0–0.04)
IMM GRANULOCYTES NFR BLD AUTO: ABNORMAL % (ref 0–0.5)
LYMPHOCYTES # BLD AUTO: ABNORMAL K/UL (ref 1–4.8)
LYMPHOCYTES NFR BLD: 4 % (ref 18–48)
MAGNESIUM SERPL-MCNC: 1.9 MG/DL (ref 1.6–2.6)
MCH RBC QN AUTO: 27.4 PG (ref 27–31)
MCHC RBC AUTO-ENTMCNC: 32.4 G/DL (ref 32–36)
MCV RBC AUTO: 85 FL (ref 82–98)
METAMYELOCYTES NFR BLD MANUAL: 1 %
MONOCYTES # BLD AUTO: ABNORMAL K/UL (ref 0.3–1)
MONOCYTES NFR BLD: 4 % (ref 4–15)
MYELOCYTES NFR BLD MANUAL: 1 %
NEUTROPHILS NFR BLD: 88 % (ref 38–73)
NEUTS BAND NFR BLD MANUAL: 2 %
NRBC BLD-RTO: 0 /100 WBC
PLATELET # BLD AUTO: 772 K/UL (ref 150–450)
PLATELET BLD QL SMEAR: ABNORMAL
PMV BLD AUTO: 10.6 FL (ref 9.2–12.9)
POTASSIUM SERPL-SCNC: 4.4 MMOL/L (ref 3.5–5.1)
PROT SERPL-MCNC: 6.3 G/DL (ref 6–8.4)
RBC # BLD AUTO: 2.88 M/UL (ref 4–5.4)
SODIUM SERPL-SCNC: 136 MMOL/L (ref 136–145)
SPHEROCYTES BLD QL SMEAR: ABNORMAL
TARGETS BLD QL SMEAR: ABNORMAL
WBC # BLD AUTO: 20.4 K/UL (ref 3.9–12.7)

## 2024-05-06 PROCEDURE — 36415 COLL VENOUS BLD VENIPUNCTURE: CPT | Performed by: HOSPITALIST

## 2024-05-06 PROCEDURE — 94760 N-INVAS EAR/PLS OXIMETRY 1: CPT

## 2024-05-06 PROCEDURE — 63600175 PHARM REV CODE 636 W HCPCS: Performed by: NURSE PRACTITIONER

## 2024-05-06 PROCEDURE — 85007 BL SMEAR W/DIFF WBC COUNT: CPT | Performed by: HOSPITALIST

## 2024-05-06 PROCEDURE — 63600175 PHARM REV CODE 636 W HCPCS: Performed by: INTERNAL MEDICINE

## 2024-05-06 PROCEDURE — 63600175 PHARM REV CODE 636 W HCPCS: Mod: JZ,JG | Performed by: NURSE PRACTITIONER

## 2024-05-06 PROCEDURE — 94761 N-INVAS EAR/PLS OXIMETRY MLT: CPT

## 2024-05-06 PROCEDURE — 25000003 PHARM REV CODE 250: Performed by: HOSPITALIST

## 2024-05-06 PROCEDURE — 63600175 PHARM REV CODE 636 W HCPCS: Performed by: HOSPITALIST

## 2024-05-06 PROCEDURE — C9113 INJ PANTOPRAZOLE SODIUM, VIA: HCPCS | Performed by: INTERNAL MEDICINE

## 2024-05-06 PROCEDURE — 25000242 PHARM REV CODE 250 ALT 637 W/ HCPCS: Performed by: HOSPITALIST

## 2024-05-06 PROCEDURE — 85027 COMPLETE CBC AUTOMATED: CPT | Performed by: HOSPITALIST

## 2024-05-06 PROCEDURE — 99233 SBSQ HOSP IP/OBS HIGH 50: CPT | Mod: FS,,, | Performed by: NURSE PRACTITIONER

## 2024-05-06 PROCEDURE — 83735 ASSAY OF MAGNESIUM: CPT | Performed by: HOSPITALIST

## 2024-05-06 PROCEDURE — 25000003 PHARM REV CODE 250: Performed by: NURSE PRACTITIONER

## 2024-05-06 PROCEDURE — 25000003 PHARM REV CODE 250

## 2024-05-06 PROCEDURE — 99900031 HC PATIENT EDUCATION (STAT)

## 2024-05-06 PROCEDURE — 94640 AIRWAY INHALATION TREATMENT: CPT

## 2024-05-06 PROCEDURE — 12000002 HC ACUTE/MED SURGE SEMI-PRIVATE ROOM

## 2024-05-06 PROCEDURE — 80053 COMPREHEN METABOLIC PANEL: CPT | Performed by: HOSPITALIST

## 2024-05-06 PROCEDURE — 25000003 PHARM REV CODE 250: Performed by: INTERNAL MEDICINE

## 2024-05-06 RX ADMIN — ASPIRIN 81 MG 81 MG: 81 TABLET ORAL at 08:05

## 2024-05-06 RX ADMIN — METOPROLOL TARTRATE 25 MG: 25 TABLET, FILM COATED ORAL at 08:05

## 2024-05-06 RX ADMIN — IPRATROPIUM BROMIDE AND ALBUTEROL SULFATE 3 ML: 2.5; .5 SOLUTION RESPIRATORY (INHALATION) at 03:05

## 2024-05-06 RX ADMIN — FOLIC ACID 1000 MCG: 1 TABLET ORAL at 08:05

## 2024-05-06 RX ADMIN — VANCOMYCIN HYDROCHLORIDE 1000 MG: 1 INJECTION, POWDER, LYOPHILIZED, FOR SOLUTION INTRAVENOUS at 03:05

## 2024-05-06 RX ADMIN — LEVOFLOXACIN 750 MG: 5 INJECTION, SOLUTION INTRAVENOUS at 01:05

## 2024-05-06 RX ADMIN — LEVETIRACETAM 500 MG: 5 INJECTION INTRAVENOUS at 10:05

## 2024-05-06 RX ADMIN — APIXABAN 2.5 MG: 2.5 TABLET, FILM COATED ORAL at 08:05

## 2024-05-06 RX ADMIN — THIAMINE HCL TAB 100 MG 100 MG: 100 TAB at 08:05

## 2024-05-06 RX ADMIN — LATANOPROST 1 DROP: 50 SOLUTION/ DROPS OPHTHALMIC at 10:05

## 2024-05-06 RX ADMIN — IPRATROPIUM BROMIDE AND ALBUTEROL SULFATE 3 ML: 2.5; .5 SOLUTION RESPIRATORY (INHALATION) at 07:05

## 2024-05-06 RX ADMIN — PANTOPRAZOLE SODIUM 40 MG: 40 INJECTION, POWDER, FOR SOLUTION INTRAVENOUS at 10:05

## 2024-05-06 RX ADMIN — LEVETIRACETAM 500 MG: 5 INJECTION INTRAVENOUS at 08:05

## 2024-05-06 RX ADMIN — INSULIN ASPART 2 UNITS: 100 INJECTION, SOLUTION INTRAVENOUS; SUBCUTANEOUS at 10:05

## 2024-05-06 RX ADMIN — APIXABAN 2.5 MG: 2.5 TABLET, FILM COATED ORAL at 10:05

## 2024-05-06 RX ADMIN — METOPROLOL TARTRATE 25 MG: 25 TABLET, FILM COATED ORAL at 10:05

## 2024-05-06 RX ADMIN — PANTOPRAZOLE SODIUM 40 MG: 40 INJECTION, POWDER, FOR SOLUTION INTRAVENOUS at 08:05

## 2024-05-06 RX ADMIN — CEFTAZIDIME, AVIBACTAM 1.25 G: 2; .5 POWDER, FOR SOLUTION INTRAVENOUS at 01:05

## 2024-05-06 RX ADMIN — AMLODIPINE BESYLATE 10 MG: 5 TABLET ORAL at 08:05

## 2024-05-06 RX ADMIN — ALLOPURINOL 100 MG: 100 TABLET ORAL at 08:05

## 2024-05-06 RX ADMIN — ATORVASTATIN CALCIUM 80 MG: 40 TABLET, FILM COATED ORAL at 10:05

## 2024-05-06 NOTE — RESPIRATORY THERAPY
05/05/24 2023   Patient Assessment/Suction   Level of Consciousness (AVPU) alert   Respiratory Effort Normal;Unlabored   Expansion/Accessory Muscles/Retractions no retractions;no use of accessory muscles   All Lung Fields Breath Sounds clear;diminished   Rhythm/Pattern, Respiratory pattern regular;unlabored   Cough Frequency infrequent   PRE-TX-O2   Device (Oxygen Therapy) room air   SpO2 96 %   Pulse Oximetry Type Intermittent   $ Pulse Oximetry - Multiple Charge Pulse Oximetry - Multiple   Pulse 84   Resp 18   Aerosol Therapy   $ Aerosol Therapy Charges Aerosol Treatment   Daily Review of Necessity (SVN) completed   Respiratory Treatment Status (SVN) given   Treatment Route (SVN) mask;oxygen   Patient Position HOB elevated   Post Treatment Assessment (SVN) breath sounds unchanged   Signs of Intolerance (SVN) none   Education   $ Education Bronchodilator;15 min

## 2024-05-06 NOTE — SUBJECTIVE & OBJECTIVE
Interval History:  Patient seen and examined. Overnight notes reviewed.  WBC improving on am labs. Afebrile.IV abx. Repeat blood cultures from 04/30 and 5/2 with NGTD. ID following. Leukocytosis persists on am labs but improving. Pt completed Avycaz. Called patient's POALeonie, to discuss care but no answer. Left VM. ID following. Hematology consulted for persistent thrombocytosis. Palliative care consulted.      Review of Systems   Unable to perform ROS: Patient nonverbal     Objective:     Vital Signs (Most Recent):  Temp: 97.9 °F (36.6 °C) (05/06/24 1228)  Pulse: 84 (05/06/24 1525)  Resp: 16 (05/06/24 1525)  BP: (!) 142/66 (05/06/24 1228)  SpO2: 97 % (05/06/24 1525) Vital Signs (24h Range):  Temp:  [97.4 °F (36.3 °C)-98.4 °F (36.9 °C)] 97.9 °F (36.6 °C)  Pulse:  [76-91] 84  Resp:  [16-20] 16  SpO2:  [95 %-98 %] 97 %  BP: (123-151)/(60-67) 142/66     Weight: 53.5 kg (118 lb)  Body mass index is 23.83 kg/m².    Intake/Output Summary (Last 24 hours) at 5/6/2024 1610  Last data filed at 5/6/2024 0536  Gross per 24 hour   Intake 1768.05 ml   Output 1250 ml   Net 518.05 ml         Physical Exam  Vitals and nursing note reviewed.   Constitutional:       Appearance: Normal appearance. She is normal weight. She is ill-appearing (chronically).   HENT:      Head: Normocephalic and atraumatic.      Mouth/Throat:      Mouth: Mucous membranes are moist.      Pharynx: Oropharynx is clear.   Eyes:      Extraocular Movements: Extraocular movements intact.   Cardiovascular:      Rate and Rhythm: Normal rate and regular rhythm.      Pulses: Normal pulses.      Heart sounds: Normal heart sounds.   Pulmonary:      Effort: Pulmonary effort is normal.      Breath sounds: Decreased breath sounds present.   Abdominal:      General: Abdomen is flat. Bowel sounds are normal.      Palpations: Abdomen is soft.      Tenderness: There is no abdominal tenderness. There is no guarding or rebound.      Comments: G tube in place.     Genitourinary:     Comments: Mathur catheter in place.   Rectal tube with stool noted.   Neurological:      Mental Status: She is alert. Mental status is at baseline.      Comments: Patient was alert and more interactive today.  Able to mouth yes or no to simple questions.             Significant Labs: All pertinent labs within the past 24 hours have been reviewed.  CBC:   Recent Labs   Lab 05/05/24  0637 05/06/24  0609   WBC 20.71* 20.40*   HGB 8.1* 7.9*   HCT 25.4* 24.4*   * 772*     CMP:   Recent Labs   Lab 05/05/24  0637 05/06/24  0609    136   K 4.5 4.4    105   CO2 24 23   * 195*   BUN 26* 24*   CREATININE 0.9 0.9   CALCIUM 8.6* 8.5*   PROT 6.6 6.3   ALBUMIN 2.7* 2.6*   BILITOT 0.3 0.2   ALKPHOS 134 126   AST 20 21   ALT 25 25   ANIONGAP 8 8       Significant Imaging: I have reviewed all pertinent imaging results/findings within the past 24 hours.    CXR:  Bilateral lower lung opacities suspicious for multifocal pneumonia, right greater than left, having progressed compared to prior exams.

## 2024-05-06 NOTE — CARE UPDATE
05/06/24 0743   Patient Assessment/Suction   Level of Consciousness (AVPU) alert   Respiratory Effort Normal;Unlabored   Expansion/Accessory Muscles/Retractions expansion symmetric;no retractions;no use of accessory muscles   All Lung Fields Breath Sounds Anterior:;Lateral:;diminished;clear   Rhythm/Pattern, Respiratory pattern regular;unlabored   PRE-TX-O2   Device (Oxygen Therapy) room air   SpO2 97 %   Pulse Oximetry Type Intermittent   $ Pulse Oximetry - Multiple Charge Pulse Oximetry - Multiple   Pulse 82   Resp 18   Aerosol Therapy   $ Aerosol Therapy Charges Aerosol Treatment   Daily Review of Necessity (SVN) completed   Respiratory Treatment Status (SVN) given   Treatment Route (SVN) mask;oxygen   Patient Position HOB elevated   Post Treatment Assessment (SVN) breath sounds improved   Signs of Intolerance (SVN) none   Education   $ Education Bronchodilator;15 min

## 2024-05-06 NOTE — PROGRESS NOTES
[facility]  Progress Note    Patient Name: Steff Johnson  MRN: [id number]  Patient Class: IP- Inpatient   Admission Date: 4/25/2024  Length of Stay: 11 days  Attending Physician: Jose A Escoto MD  Primary Care Provider: Cristina Ren MD        Subjective:     Principal Problem:Severe sepsis        HPI:  65 yo female with PMH of CVA with known baseline confusion and aphasia who was sent to the ER because of black drainage coming from around the PEG tube site and having black vomit.     On presentation to the ER, vitals showed 101/59, HR of 107, RR of 25 and fever of 102.3, spo2 of 95%. Cbc with wbc of 28 and anemia of 8.8. CMP showed acute renal failure with creatinine of of 2.5, anion gap of 19, hyponatermia of 133, potassium of 5.9. lactic acid of 6.1. Pt started to have projectile hematemesis. NGT was placed. Mathur was replaced after which pt developed hematuria. UA was grossly positive. C.diffi antigen is positive. EKG showed sinus tachycardia. CT C/A/P showed scattered nonspecific bronchiolitis in both lungs, with right lower lobes opacities potentially reflecting chronic bronchopneumonia, given similar appearance to CT of 12/11/2023. Pt was given the hyperkalemia procotol, 1.6 L of fluids, vanco/zosyn, and protonix 80 mg iv. She will be admitted to medicine for further management.     Overview/Hospital Course:  Ms. Johnson is a resident of a local nursing home she was admitted for GI hemorrhage, severe sepsis, COURTNEY, UTI, and C. Diff. while here, her labs and vital signs were monitored and she was maintained on telemetry.  He was placed on IV vancomycin, Merrem, and vancomycin per PEG while awaiting culture results.  Infectious Disease was consulted to assist with management.  She has a chronic Mathur catheter, which was replaced, and she initially developed hematuria, likely from the trauma of the Mathur catheter being inserted, which did resolve.  She was placed on IV  Protonix and GI was consulted.  She underwent an EGD which was negative for any bleeding and showed moderate/severe distal esophagitis and non-erosive gastritis.  Her anticoagulants, aspirin and Eliquis, were then resumed.  She initially had an NG tube, which was discontinued at some point.  Her continuous tube feedings via PEG were resume and she has tolerated.  She was placed on IV fluids her COURTNEY and her renal function improved.  Her urine culture started grew out Klebsiella (/MDRO) and Providencia stuartii and the Merrem was discontinued and she was started on ceftazidime-avibactam.  Her blood cultures were also positive with Staphylococcus haemolyticus and she was continued on IV vancomycin.  Her stool studies were negative for acute C diff infection however she continued on vancomycin per PEG empirically due to her risk for developing C diff. Repeat urine and blood cultures were collected.  He continued to have loose stools, which may have been due to the continuous tube feedings, and she was started on acidophilus per PEG.  She has a chronic sacral decubitus and Wound Care was consulted and followed her for her wound management.  Her wound did not appear to be contributory to her sepsis.                Interval History:  Patient seen and examined. Overnight notes reviewed.  WBC improving on am labs. Afebrile.IV abx. Repeat blood cultures from 04/30 negative and 5/2 with NGTD. ID following. Leukocytosis persists on am labs but improving. Pt completed Avycaz. Called patient's POALeonie, to discuss care but no answer. Left VM. ID following. Hematology consulted for persistent thrombocytosis. Palliative care consulted.      Review of Systems   Unable to perform ROS: Patient nonverbal     Objective:     Vital Signs (Most Recent):  Temp: 97.9 °F (36.6 °C) (05/06/24 1228)  Pulse: 84 (05/06/24 1525)  Resp: 16 (05/06/24 1525)  BP: (!) 142/66 (05/06/24 1228)  SpO2: 97 % (05/06/24 1525) Vital Signs (24h Range):  Temp:   [97.4 °F (36.3 °C)-98.4 °F (36.9 °C)] 97.9 °F (36.6 °C)  Pulse:  [76-91] 84  Resp:  [16-20] 16  SpO2:  [95 %-98 %] 97 %  BP: (123-151)/(60-67) 142/66     Weight: 53.5 kg (118 lb)  Body mass index is 23.83 kg/m².    Intake/Output Summary (Last 24 hours) at 5/6/2024 1610  Last data filed at 5/6/2024 0536  Gross per 24 hour   Intake 1768.05 ml   Output 1250 ml   Net 518.05 ml         Physical Exam  Vitals and nursing note reviewed.   Constitutional:       Appearance: Normal appearance. She is normal weight. She is ill-appearing (chronically).   HENT:      Head: Normocephalic and atraumatic.      Mouth/Throat:      Mouth: Mucous membranes are moist.      Pharynx: Oropharynx is clear.   Eyes:      Extraocular Movements: Extraocular movements intact.   Cardiovascular:      Rate and Rhythm: Normal rate and regular rhythm.      Pulses: Normal pulses.      Heart sounds: Normal heart sounds.   Pulmonary:      Effort: Pulmonary effort is normal.      Breath sounds: Decreased breath sounds present.   Abdominal:      General: Abdomen is flat. Bowel sounds are normal.      Palpations: Abdomen is soft.      Tenderness: There is no abdominal tenderness. There is no guarding or rebound.      Comments: G tube in place.    Genitourinary:     Comments: Mathur catheter in place.   Rectal tube with stool noted.   Neurological:      Mental Status: She is alert. Mental status is at baseline.      Comments: Patient was alert and more interactive today.  Able to mouth yes or no to simple questions.             Significant Labs: All pertinent labs within the past 24 hours have been reviewed.  CBC:   Recent Labs   Lab 05/05/24  0637 05/06/24  0609   WBC 20.71* 20.40*   HGB 8.1* 7.9*   HCT 25.4* 24.4*   * 772*     CMP:   Recent Labs   Lab 05/05/24  0637 05/06/24  0609    136   K 4.5 4.4    105   CO2 24 23   * 195*   BUN 26* 24*   CREATININE 0.9 0.9   CALCIUM 8.6* 8.5*   PROT 6.6 6.3   ALBUMIN 2.7* 2.6*   BILITOT 0.3  "0.2   ALKPHOS 134 126   AST 20 21   ALT 25 25   ANIONGAP 8 8       Significant Imaging: I have reviewed all pertinent imaging results/findings within the past 24 hours.    CXR:  Bilateral lower lung opacities suspicious for multifocal pneumonia, right greater than left, having progressed compared to prior exams.     Assessment/Plan:      * Severe sepsis  This patient does have evidence of infective focus  My overall impression is  severe sepsis :  Evidenced by leukocytosis, fever, current infection, and lactic acidosis.  Source: Urinary Tract  Antibiotics given-   Antibiotics (72h ago, onward)      Start     Stop Route Frequency Ordered    05/05/24 1400  vancomycin in dextrose 5 % 1 gram/250 mL IVPB 1,000 mg        Note to Pharmacy: Ht: 4' 11" (1.499 m)  Wt: 53.5 kg (118 lb)  Estimated Creatinine Clearance: 47.4 mL/min (based on SCr of 0.9 mg/dL).  Body mass index is 23.83 kg/m².    -- IV Every 24 hours (non-standard times) 05/05/24 0830    05/02/24 1400  levoFLOXacin 750 mg/150 mL IVPB 750 mg         -- IV Every 24 hours (non-standard times) 05/02/24 1250    04/30/24 1653  vancomycin - pharmacy to dose  (vancomycin IVPB (PEDS and ADULTS))        Placed in "And" Linked Group    -- IV pharmacy to manage frequency 04/30/24 1554    04/29/24 0800  cefTAZidime-avibactam (AVYCAZ) 1.25 g in dextrose 5 % (D5W) 100 mL         05/06/24 0959 IV Every 8 hours (non-standard times) 04/29/24 0046          Latest lactate reviewed-  No results for input(s): "LACTATE", "POCLAC" in the last 72 hours.    Organ dysfunction indicated by Acute kidney injury-- which has resolved    Fluid challenge Not needed - patient is not hypotensive      Post- resuscitation assessment No - Post resuscitation assessment not needed       Will Not start Pressors- Levophed for MAP of 65  Source control achieved by:  IV antibiotics  ---------------------------------------------------------------  It is noted that patient has history of recurrent episodes of " sepsis due to complicated UTI.  Awaiting urine culture results  Procalcitonin 8.983.  Not sure if this level is improved since an initial level was not obtained when patient was admitted.  Highest Procalcitonin was 133 with patient's last episode of sepsis 12/2/23.  Lactic acidosis resolved  Urine culture positive Klebsiella (/MDRO) and Providencia stuartii/ repeat urine culture negative so far  Blood cultures collected 4/25 (+) Staphylococcus haemolyticus/ repeat blood cultures collected 4/29 negative so far  Stool negative for acute C diff infection  Continue IV vancomycin and ceftazidime-avibactam   Continue IV fluid hydration  Fevers are slowly improving. Had temp 202.2 4/28 @ 2245.  First dose of ceftazidime-avibactam given 4/29 @ 0035.  T-max since starting new antibiotic was 100.9 @ 4/29 0420.  Last antipyretic given 4/29 @2130.  GI PCR + for salmonella and abx adjusted to vanc, avycaz, and Levaquin 5/2.   5/3: WBC increasing on am labs; CXR with worsening infiltrates; discussed case with the patient's POA at length; will consult palliative Care; at this time POA wishes for patient's remained full code  5/4: leukocytosis; ID following; IV abx; discussed case with pt's brother in person and POA via phone.   5/5: leukocytosis; ID following; on IV vanc, Avycaz, and Levaquin; palliative care consult; updated patient's POA on phone.   5/6: leukocytosis; ID following; Avycaz completed; IV vanc and Levaquin; awaiting palliative care and heme consult; called POA no answer and left VM.    Bacteremia  Blood cultures collected 4/25 (+) Staphylococcus haemolyticus  Repeat blood cultures collected 4/30 and 5/2 negative so far   Continue IV vancomycin      Encephalopathy chronic        Quadriplegia  Chronic/due to history of CVA  Continue pressure relief measures      History of ischemic stroke  Continue prevention: statin, ASA, Eliquis     Acute kidney injury  Essentially resolved/creatinine normal.    Patient with  "acute kidney injury/acute renal failure likely due to pre-renal azotemia due to IVVD 2/2 GIB and Sepsis. COURTNEY is currently stable. Baseline creatinine  0.7  - Labs reviewed- Renal function/electrolytes with Estimated Creatinine Clearance: 60.9 mL/min (based on SCr of 0.7 mg/dL). according to latest data. Monitor urine output and serial BMP and adjust therapy as needed. Avoid nephrotoxins and renally dose meds for GFR listed above.      Type 2 diabetes mellitus with neurologic complication, with long-term current use of insulin  Patient's FSGs are uncontrolled due to hyperglycemia on current medication regimen.  Last A1c reviewed-   Lab Results   Component Value Date    HGBA1C 7.8 (H) 12/05/2023     Most recent fingerstick glucose reviewed- No results for input(s): "POCTGLUCOSE" in the last 24 hours.  Current correctional scale  Low  Increase anti-hyperglycemic dose as follows-   Antihyperglycemics (From admission, onward)      Start     Stop Route Frequency Ordered    04/30/24 2100  insulin detemir U-100 (Levemir) pen 18 Units         -- SubQ Nightly 04/30/24 1550    04/30/24 1649  insulin aspart U-100 pen 0-10 Units         -- SubQ Every 6 hours PRN 04/30/24 1550          Hold Oral hypoglycemics while patient is in the hospital.  -----------------------------------------------  Increase basal insulin to 18 units nightly and Increase SSI to medium dose      Hypertension  Chronic.    Temp:  [97.4 °F (36.3 °C)-98.4 °F (36.9 °C)]   Pulse:  [76-91]   Resp:  [16-20]   BP: (123-151)/(60-67)   SpO2:  [95 %-98 %] .   Home meds for hypertension were reviewed and noted below.   Hypertension Medications               amLODIPine (NORVASC) 10 MG tablet 1 tablet (10 mg total) by Per G Tube route once daily.    furosemide (LASIX) 40 MG tablet 40 mg by Per G Tube route once daily.    losartan (COZAAR) 100 MG tablet 1 tablet (100 mg total) by Per G Tube route once daily.    metoprolol tartrate (LOPRESSOR) 25 MG tablet 25 mg by Per G " Tube route 2 (two) times daily.    cloNIDine (CATAPRES) 0.1 MG tablet 1 tablet (0.1 mg total) by Per G Tube route 3 (three) times daily.          Will order clonidine 0.1 q8h prn only in case pt went into rebound HTN.5/1: pt consistently hypertensive added home norvasc and metoprolol     Clostridium difficile infection  Stool positive C diff antigen, negative toxins, Negative C. Diff PCR---> not acute C. Diff infection  ID following-- continue oral vancomycin for now  Diarrhea possibly from tube feedings  Start lactobacillus 2 tabs bid  GI PCR was positive for Salmonella and antibiotics adjusted per ID    Complicated UTI (urinary tract infection)  History of chronic Mathur catheter  Catheter changed on 04/25/24   Urine culture collected 4/23 (+) Klebsiella pneumoniae (/MDRO) and Providencia stuartii  Urine culture collected 4/29 negative so far  Continue ceftazidime-avibactam (Started 4/29)  UA collected 4/29 improved when compared to UA collected 4/25: Bact- few (4/29),Many (4/25)/ Color- yellow (was red 4/25)/ Blood- 1+ (was 3+)/ still with pyuria and 3+ Leuk Est/ Remains Nitrite neg  Repeat urine negative  Completed Avycaz    Acute blood loss anemia  Patient's anemia is currently controlled. Has received 2 units of PRBCs on 4/24 . Etiology likely d/t acute blood loss which was from GIB  Current CBC reviewed-   Lab Results   Component Value Date    HGB 7.9 (L) 05/06/2024    HCT 24.4 (L) 05/06/2024     Monitor serial CBC and transfuse if patient becomes hemodynamically unstable, symptomatic or H/H drops below 7/21.    Hyperkalemia  Patient has hypokalemia which is Acute and currently controlled. Most recent potassium levels reviewed-   Lab Results   Component Value Date    K 4.8 05/03/2024   . Will continue potassium replacement per protocol and recheck repeat levels after replacement completed.   -----------------------------------------------------------------------  Potassium currently normal        GI  hemorrhage  GI consulted: S/P EGD: moderate/severe distal esophagitis, no bleeding.  Aspirin Eliquis has been resumed  Continue IV Protonix b.i.d.    Seizure disorder  Controlled.  Continue chronic antiepileptic.  Seizure precautions       Sacral decubitus ulcer, stage III  Chronic issue  Wound Care following  Continue current wound care regimen   Continue pressure relief measures      Thrombocytosis  Acute on chronic, worsening:  - hematology consulted: appreciate recommendations  - thought to be reactive from infection however thrombocytosis worsens while WBC, procal, and inflammatory markers improve       Aphasia  Due to history of CVA  CXR with concern for worsening infiltrates; concern for aspiration; scopolamine patch ordered for decreased secretions; discussed case with POA and palliative care consulted        VTE Risk Mitigation (From admission, onward)           Ordered     apixaban tablet 2.5 mg  2 times daily         05/01/24 1524     IP VTE HIGH RISK PATIENT  Once         04/25/24 1903     Place sequential compression device  Until discontinued         04/25/24 1903                    Discharge Planning   ALEX: 5/8/2024     Code Status: Full Code   Is the patient medically ready for discharge?:     Reason for patient still in hospital (select all that apply): Patient trending condition, Laboratory test, Treatment, Imaging, Consult recommendations, and Pending disposition  Discharge Plan A: Return to nursing home                  Lizett Scales PA-C  Department of Hospital Medicine   [facility]

## 2024-05-06 NOTE — ASSESSMENT & PLAN NOTE
"This patient does have evidence of infective focus  My overall impression is  severe sepsis :  Evidenced by leukocytosis, fever, current infection, and lactic acidosis.  Source: Urinary Tract  Antibiotics given-   Antibiotics (72h ago, onward)      Start     Stop Route Frequency Ordered    05/05/24 1400  vancomycin in dextrose 5 % 1 gram/250 mL IVPB 1,000 mg        Note to Pharmacy: Ht: 4' 11" (1.499 m)  Wt: 53.5 kg (118 lb)  Estimated Creatinine Clearance: 47.4 mL/min (based on SCr of 0.9 mg/dL).  Body mass index is 23.83 kg/m².    -- IV Every 24 hours (non-standard times) 05/05/24 0830    05/02/24 1400  levoFLOXacin 750 mg/150 mL IVPB 750 mg         -- IV Every 24 hours (non-standard times) 05/02/24 1250    04/30/24 1653  vancomycin - pharmacy to dose  (vancomycin IVPB (PEDS and ADULTS))        Placed in "And" Linked Group    -- IV pharmacy to manage frequency 04/30/24 1554    04/29/24 0800  cefTAZidime-avibactam (AVYCAZ) 1.25 g in dextrose 5 % (D5W) 100 mL         05/06/24 0959 IV Every 8 hours (non-standard times) 04/29/24 0046          Latest lactate reviewed-  No results for input(s): "LACTATE", "POCLAC" in the last 72 hours.    Organ dysfunction indicated by Acute kidney injury-- which has resolved    Fluid challenge Not needed - patient is not hypotensive      Post- resuscitation assessment No - Post resuscitation assessment not needed       Will Not start Pressors- Levophed for MAP of 65  Source control achieved by:  IV antibiotics  ---------------------------------------------------------------  It is noted that patient has history of recurrent episodes of sepsis due to complicated UTI.  Awaiting urine culture results  Procalcitonin 8.983.  Not sure if this level is improved since an initial level was not obtained when patient was admitted.  Highest Procalcitonin was 133 with patient's last episode of sepsis 12/2/23.  Lactic acidosis resolved  Urine culture positive Klebsiella (/MDRO) and Providencia " stuartii/ repeat urine culture negative so far  Blood cultures collected 4/25 (+) Staphylococcus haemolyticus/ repeat blood cultures collected 4/29 negative so far  Stool negative for acute C diff infection  Continue IV vancomycin and ceftazidime-avibactam   Continue IV fluid hydration  Fevers are slowly improving. Had temp 202.2 4/28 @ 2245.  First dose of ceftazidime-avibactam given 4/29 @ 0035.  T-max since starting new antibiotic was 100.9 @ 4/29 0420.  Last antipyretic given 4/29 @2130.  GI PCR + for salmonella and abx adjusted to vanc, avycaz, and Levaquin 5/2.   5/3: WBC increasing on am labs; CXR with worsening infiltrates; discussed case with the patient's POA at length; will consult palliative Care; at this time POA wishes for patient's remained full code  5/4: leukocytosis; ID following; IV abx; discussed case with pt's brother in person and POA via phone.   5/5: leukocytosis; ID following; on IV vanc, Avycaz, and Levaquin; palliative care consult; updated patient's POA on phone.   5/6: leukocytosis; ID following; Avycaz completed; IV vanc and Levaquin; awaiting palliative care and heme consult; called POA no answer and left VM.

## 2024-05-06 NOTE — ASSESSMENT & PLAN NOTE
Patient's anemia is currently controlled. Has received 2 units of PRBCs on 4/24 . Etiology likely d/t acute blood loss which was from GIB  Current CBC reviewed-   Lab Results   Component Value Date    HGB 7.9 (L) 05/06/2024    HCT 24.4 (L) 05/06/2024     Monitor serial CBC and transfuse if patient becomes hemodynamically unstable, symptomatic or H/H drops below 7/21.

## 2024-05-06 NOTE — PROGRESS NOTES
[facility]   Department of Infectious Disease  Progress  Note        PATIENT NAME: Steff Johnson  MRN: [id number]  TODAY'S DATE: 05/06/2024  ADMIT DATE: 4/25/2024  LOS: 11 days    CHIEF COMPLAINT: GI Problem (Brown fluid being withdrawn from peg tube, vomiting brown fluid. Smells of feces. Since this morning. Pt is nonverbal)    PRINCIPLE PROBLEM: Severe sepsis    REASON FOR CONSULT: UA and c.diffi     INTERVAL HISTORY     5/6/24@1052 (Danette):  Patient seen and examined alone in her room.  She was much more alert today and so she was feeling all right.  WBC has decreased to 20.40, left shift 88% slightly increased, bands 2%.  Platelets increased to 772, H&H 7.9/24.4, BUN 24, creatinine 0.9 with creatinine clearance 47.4, last CRP 6.80 from yesterday is trending down, procalcitonin 1.012 trending down.  Vanc trough from yesterday 22.1.  She still has a large amount of liquid stool.  She has a palliative care consult pending today.  We examined sacral wound in it was very small and not deep.  She has a very coarse cough, is on room air but is probably constantly aspirating.  Blood cultures from 04/30 were negative and from 05/02 are no growth to date.  Sputum cultures from 05/02 with reduced normal respiratory zach.  T-max 98.4° in last 24 hours.  No new diagnostics.    5/4:  Patient seen and examined at bedside, nonverbal, just staring at the door.  Hemodynamically stable, remains on O2 by NC.  Still hypertensive, currently normal BP, afebrile.  Stool is less liquid, rectal tube remains in place, adequate urinary output.  Labs reviewed, leukocytosis 24.1, left shift 79%, H&H 8/24, platelet count 675, bands 2%, hyponatremia 133, stable creatinine and liver function tests, will check CRP and procalcitonin tomorrow.  Micro reviewed, so far no growth.    5/2/24@1126 (Danette):  Patient is lying in bed awake and alert though she makes poor eye contact in his nonverbal today.  She continues with a rectal  tube but stool seems to be thickening in his not as copious.  She has a very coarse frequent cough but continues on room air.  We have asked for a endotracheal aspirate sputum culture to be done.  Gastrointestinal PCR with salmonella detected.  Discussed with Infectious control practitioner.  4/30 blood cultures no growth to date.  One set of blood cultures from 05/02 no growth to date.  Repeat urine culture on 04/29 no growth to date.  T-max 99.5° in the last 24 hours.  WBC 23.69 trending up, platelets 571 trending up, left shift resolved, 4% bands up from yesterday 1% bands, BUN/CR 24/0.7 with 60.9 estimated creatinine clearance.  BNP 82.  Lactic acid yesterday was    5/1/2024@1228 (Danette):  Patient actually looks a little better today.  When you ask her how she was doing she says all right.  She denies pain.  She still has a rectal tube stool is not as copious and liquid as it was yesterday.  T-max 99.6° in last 24 hour.  KUB from yesterday with unremarkable bowel gas pattern and patchy hazy opacities in the right lung base possibly atelectasis or infiltrate.  WBC 21.74 trending down, platelets 541 trending up again.  Left shift 77%, bands improved at 1%.  CRP 14.40 trending down, procalcitonin 5.069 trending down.  4/30 blood cultures x2 sets no growth to date.  RVP negative.  MRSA screen negative.  4/29 blood and urine cultures negative.  Echocardiogram with no abnormalities.    4/30/24@1315 (Danette):  Patient seen alone in her room today.  She opens her eyes but does not pay attention.  She does not appear in any distress in his on room air.  She has a rectal tube with a copious amount of liquid stool.  Last chest x-ray from 04/27 with small right pleural effusion and right basilar airspace disease that appears worse than previous with subsegmental atelectasis of the left lung base.  Last fever on 04/28 was 102.2, T-max in the last 24 hours 100, WBC 23.17 still elevated, platelets 501 still elevated, left  "shift 77% with 4% bands.  Anemia better after blood transfusion at 9.2/28.6.  BUN/CR 35/0.9, creatinine clearance 47.4, alk-phos elevated 165, albumin 2.7, ALT/AST 63/47.  Procalcitonin 8.983 and CRP greater than 16.  Urine culture from 04/25 with Providencia stuartii and Klebsiella pneumoniae /MDRO; 4/25 blood cultures x2 sets with 1 bottle from each set with aerobic bottle positive for staph haemolyticus (vanc ana 2).    04/27/2024.  Dr. Calvo covering for the weekend.  Patient is nonverbal as usual.  I feel like she understands me when I talk to her.  Talking gently and slowly puts her at ease.  She is afebrile at this time.  T-max yesterday was 101.8.  Much better than T-max on admission of 102.3  Blood cultures are showing Staphylococcus species.  Will order repeat blood cultures   Urine cultures are showing Providencia and a LPGNR in progress.    Stool cultures no growth to date.    Antibiotics (From admission, onward)      Start     Stop Route Frequency Ordered    05/05/24 1400  vancomycin in dextrose 5 % 1 gram/250 mL IVPB 1,000 mg        Note to Pharmacy: Ht: 4' 11" (1.499 m)  Wt: 53.5 kg (118 lb)  Estimated Creatinine Clearance: 47.4 mL/min (based on SCr of 0.9 mg/dL).  Body mass index is 23.83 kg/m².    -- IV Every 24 hours (non-standard times) 05/05/24 0830    05/02/24 1400  levoFLOXacin 750 mg/150 mL IVPB 750 mg         -- IV Every 24 hours (non-standard times) 05/02/24 1250    04/30/24 1653  vancomycin - pharmacy to dose  (vancomycin IVPB (PEDS and ADULTS))        Placed in "And" Linked Group    -- IV pharmacy to manage frequency 04/30/24 1554    04/29/24 0800  cefTAZidime-avibactam (AVYCAZ) 1.25 g in dextrose 5 % (D5W) 100 mL         05/06/24 0959 IV Every 8 hours (non-standard times) 04/29/24 0046    04/25/24 2100  mupirocin 2 % ointment         04/30/24 2059 Nasl 2 times daily 04/25/24 1905          Antifungals (From admission, onward)      None             ASSESSMENT and PLAN     1. " Staphylococcus haemolyticus bacteremia, possible skin source?   -in December 2023 patient had Enterococcus faecalis bacteremia, Proteus mirabilis ESBL bacteremia both from urinary source  -4/25 blood cultures x2 sets with aerobic bottle from each set positive for staph haemolyticus - sensitive to Bactrim and vancomycin (LEAH 2)  -4/29 blood culture x1 set no growth to date, pending   -procalcitonin 8.983, CRP>16-->14.40   -4/30 leukocytosis, initially 24.95, peaked at 28.44 on 04/28, 23.17 on 04/30, left shift, 4% band  -4/30 blood cultures x2 sets negative  -5/2 blood culture with no growth to date    2. Catheter associated UTI.    -previously grew ESBL Proteus in December 2023  -Catheter changed on 04/25/24.  -4/25 urine culture with Providencia stuartii and Klebsiella pneumoniae /MDRO   -4/29 urine culture no growth to date  -Completed 7 days Avycaz on 5/5    3. Diarrhea with Positive C diff antigen with negative PCR and toxin assay   Stool cultures negative, C diff antigen positive, toxin and PCR negative   Salmonella on GI PCR positive on 05/02    4. Acute kidney injury - improved  -initial BUN/CR 84/2.7; followed by Nephrology  -4/30 BUN/CR 35/0.9 - current creatinine clearance 47.4  -5/1 creatinine has improved and is at baseline 0.7, creatinine clearance 60.9    5. Acute on chronic anemia  -given given 2 units PRBCs, managed by hospitalist    6. Pneumonia versus pulmonary edema  -initial CT chest abdomen pelvis with scattered nonspecific bronchiolitis and right lower lobe opacities with chronic bronchopneumonia similar to previous from December 2023  -chest x-rays with minimal improvement  -procalcitonin elevated 8.983-->5.0690  -Completed 7 days Avycaz on 5/5    7. Chronic medical problems including CVA, aphasia, dysphagia with PEG tube, chronic indwelling Mathur catheter, seizures      RECOMMENDATIONS:    Continue levofloxacin 750 mg IV daily for Salmonella gastroenteritis and pneumoniae (Day 5)  Continue  vancomycin 125 mg p.o. twice a day prophylactically for C diff  Continue vancomycin with pharmacy to dose - for staph haemolyticus bacteremia, last level 12, treat for 2 weeks through May 14th  Monitor stool output   Monitor O2 sats  Aspiration precautions  Recommend palliative care consult  Monitor renal function  Follow infectious/inflammatory markers  Repeat chest x-ray in a.m.    D/W Dr Cochran    Review of patient's allergies indicates:  No Known Allergies      SUBJECTIVE     Review of systems: Unable to obtain      OBJECTIVE   Temp:  [97.4 °F (36.3 °C)-98.4 °F (36.9 °C)] 97.9 °F (36.6 °C)  Pulse:  [76-91] 76  Resp:  [16-20] 18  SpO2:  [95 %-98 %] 96 %  BP: (123-151)/(60-67) 142/66  Temp:  [97.4 °F (36.3 °C)-98.4 °F (36.9 °C)]   Temp: 97.9 °F (36.6 °C) (05/06/24 1228)  Pulse: 76 (05/06/24 1228)  Resp: 18 (05/06/24 1228)  BP: (!) 142/66 (05/06/24 1228)  SpO2: 96 % (05/06/24 1228)    Intake/Output Summary (Last 24 hours) at 5/6/2024 1447  Last data filed at 5/6/2024 0536  Gross per 24 hour   Intake 1768.05 ml   Output 1250 ml   Net 518.05 ml     Physical Exam  General:  Propped up in bed, makes eye contact  Eyes: Eyes wi, answer some questions.  erus or injection.  No exudates.  Ears:  Hearing seemed grossly intact, she will turn her head to voice but is poorly attentive.  Mouth:  Moist mucous membranes, no thrush noted.  Cardiovascular: Regular rate and rhythm, no murmurs, no significant edema  Respiratory:  Very coarse, congested sounding cough.  Gastrointestinal:  Soft and obese with active bowel sounds, no distention.  Peg tube with no redness or drainage at PEG site.  Rectal tube with thick liquid stool.  Genitourinary:  Urinary catheter with clear yellow urine.  Musculoskeletal:  Looks around, minimal movement of extremities, lying on left side with pillows and contracted extremities.  Skin: Pale, warm and dry.  Wound examined to sacral area is small and no obvious bone exposure, purulence or surrounding  erythema.  Neuro: poorly attentive, does not follow commands, occasional words.  Psych:  Calm, no agitation.  Poorly attentive.  VAD:  PIV  ISOLATION:  Contact isolation    Wounds:   5/2 4/30                        Significant Labs: All pertinent labs within the past 24 hours have been reviewed.    CBC LAST 7  Recent Labs   Lab 04/30/24  0649 05/01/24  0545 05/01/24  2030 05/02/24  0016 05/02/24  0400 05/02/24  1201 05/03/24  0506 05/04/24  0536 05/05/24  0637 05/06/24  0609   WBC 23.17* 21.74*  --   --  23.69*  --  25.39* 24.18* 20.71* 20.40*   RBC 3.41* 3.11*  --   --  2.89*  --  3.10* 2.88* 3.00* 2.88*   HGB 9.2* 8.5*   < > 8.2* 8.2*  8.3* 7.9* 8.6* 8.0* 8.1* 7.9*   HCT 28.6* 26.6*  --   --  25.0*  --  26.3* 24.0* 25.4* 24.4*   MCV 84 86  --   --  87  --  85 83 85 85   MCH 27.0 27.3  --   --  28.4  --  27.7 27.8 27.0 27.4   MCHC 32.2 32.0  --   --  32.8  --  32.7 33.3 31.9* 32.4   RDW 18.6* 18.6*  --   --  18.5*  --  18.2* 17.8* 18.6* 18.6*   * 541*  --   --  571*  --  619* 675* 739* 772*   MPV 11.1 10.7  --   --  10.6  --  10.4 10.4 10.7 10.6   GRAN 77.0* 77.0*  --   --  72.0  --  81.0* 79.0* 82.1*  17.0* 88.0*   LYMPH 6.0* 9.0*  --   --  16.0*  --  8.0*  CANCELED 9.0* 6.2*  1.3 4.0*  CANCELED   MONO 6.0 9.0  --   --  3.0*  --  4.0  CANCELED 3.0* 5.8  1.2* 4.0  CANCELED   BASO  --   --   --   --   --   --  CANCELED  --  0.07 CANCELED   NRBC 0 0  --   --  0  --  0 0 0 0    < > = values in this interval not displayed.       CHEMISTRY LAST 7  Recent Labs   Lab 04/30/24  0649 05/01/24  0546 05/01/24  2030 05/02/24  0400 05/03/24  0506 05/04/24  0536 05/05/24  0637 05/06/24  0609    141 139 139 134* 133* 136 136   K 4.7 4.3 4.4 4.8 4.8 4.6 4.5 4.4    109 107 108 102 102 104 105   CO2 24 24 23 24 25 22* 24 23   ANIONGAP 7* 8 9 7* 7* 9 8 8   BUN 35* 29* 23 24* 23 25* 26* 24*   CREATININE 0.9 0.7 0.7 0.7 0.7 0.9 0.9 0.9   * 242* 364* 253* 236* 188* 198* 195*   CALCIUM 8.8 8.6*  "8.4* 8.5* 8.5* 8.3* 8.6* 8.5*   MG 1.8 1.7  --  1.8 1.7 1.9 2.0 1.9   ALBUMIN 2.7* 2.6*  --  2.5* 2.5* 2.6* 2.7* 2.6*   PROT 7.0 6.7  --  6.4 6.5 6.3 6.6 6.3   ALKPHOS 165* 136*  --  119 132 132 134 126   ALT 63* 43  --  39 37 27 25 25   AST 47* 22  --  26 25 16 20 21   BILITOT 0.3 0.3  --  0.3 0.3 0.3 0.3 0.2       Estimated Creatinine Clearance: 47.4 mL/min (based on SCr of 0.9 mg/dL).    INFLAMMATORY/PROCAL  LAST 7  Recent Labs   Lab 04/30/24  0649 05/01/24  0546 05/03/24  0506 05/05/24  0637   PROCAL 8.983* 5.069* 1.694* 1.012*   CRP  --  14.40* 8.00* 6.80*     No results found for: "ESR"  CRP   Date Value Ref Range Status   05/05/2024 6.80 (H) <1.00 mg/dL Final     Comment:     CRP-Normal Application expected values:   <1.0        mg/dL   Normal Range  1.0 - 5.0  mg/dL   Indicates mild inflammation  5.0 - 10.0 mg/dL   Indicates severe inflammation  >10.0        mg/dL   Represents serious processes and   frequently         indicates the presence of a bacterial   infection.      05/03/2024 8.00 (H) <1.00 mg/dL Final     Comment:     CRP-Normal Application expected values:   <1.0        mg/dL   Normal Range  1.0 - 5.0  mg/dL   Indicates mild inflammation  5.0 - 10.0 mg/dL   Indicates severe inflammation  >10.0        mg/dL   Represents serious processes and   frequently         indicates the presence of a bacterial   infection.      05/01/2024 14.40 (H) <1.00 mg/dL Final     Comment:     CRP-Normal Application expected values:   <1.0        mg/dL   Normal Range  1.0 - 5.0  mg/dL   Indicates mild inflammation  5.0 - 10.0 mg/dL   Indicates severe inflammation  >10.0        mg/dL   Represents serious processes and   frequently         indicates the presence of a bacterial   infection.      04/29/2024 >16.00 (H) <1.00 mg/dL Final     Comment:     CRP-Normal Application expected values:   <1.0        mg/dL   Normal Range  1.0 - 5.0  mg/dL   Indicates mild inflammation  5.0 - 10.0 mg/dL   Indicates severe " inflammation  >10.0        mg/dL   Represents serious processes and   frequently         indicates the presence of a bacterial   infection.      12/11/2023 55.1 (H) 0.0 - 8.2 mg/L Final   12/09/2023 143.3 (H) 0.0 - 8.2 mg/L Final   12/06/2023 293.0 (H) 0.0 - 8.2 mg/L Final       PRIOR  MICROBIOLOGY:  Reviewed    Susceptibility data from last 90 days.  Collected Specimen Info Organism Amp/Sulbactam Ampicillin Cefepime Ceftriaxone Ciprofloxacin Clindamycin Ertapenem Erythromycin Gentamicin Levofloxacin Meropenem Nitrofurantoin Oxacillin Penicillin   04/30/24 Blood from Peripheral, Hand, Left No growth after 5 days.                 04/30/24 Blood from Peripheral, Hand, Right No growth after 5 days.                 04/29/24 Blood from Peripheral, Hand, Right No growth after 5 days.                 04/25/24 Urine Providencia stuartii  I   S  S  R   S   R  R  S        KLEBSIELLA PNEUMONIAE   R  R  R  R  R   R   S  I  R  R     04/25/24 Blood Staphylococcus haemolyticus     R   R   R      R  R   04/25/24 Blood Staphylococcus haemolyticus                 02/23/24 Blood from Peripheral, Antecubital, Left No growth after 5 days.                 02/23/24 Blood from Peripheral, Antecubital, Left No growth after 5 days.                 02/23/24 Urine Candida tropicalis                   Collected Specimen Info Organism PIPERACILLIN/TAZO SUSCEPTIBILITY Tetracycline Tobramycin Trimeth/Sulfa Vancomycin   04/30/24 Blood from Peripheral, Hand, Left No growth after 5 days.        04/30/24 Blood from Peripheral, Hand, Right No growth after 5 days.        04/29/24 Blood from Peripheral, Hand, Right No growth after 5 days.        04/25/24 Urine Providencia stuartii  S   R  S      KLEBSIELLA PNEUMONIAE   R  R  I  R    04/25/24 Blood Staphylococcus haemolyticus   R   S  S   04/25/24 Blood Staphylococcus haemolyticus        02/23/24 Blood from Peripheral, Antecubital, Left No growth after 5 days.        02/23/24 Blood from Peripheral,  Antecubital, Left No growth after 5 days.        02/23/24 Urine Candida tropicalis              LAST 7 DAYS MICROBIOLOGY   Microbiology Results (last 7 days)       Procedure Component Value Units Date/Time    Blood culture [[id number]] Collected: 05/02/24 0016    Order Status: Completed Specimen: Blood from Peripheral, Hand, Left Updated: 05/06/24 0232     Blood Culture, Routine No Growth to date      No Growth to date      No Growth to date      No Growth to date      No Growth to date    Narrative:      x2    Blood culture [[id number]] Collected: 04/30/24 1729    Order Status: Completed Specimen: Blood from Peripheral, Hand, Right Updated: 05/05/24 1832     Blood Culture, Routine No growth after 5 days.    Blood culture [[id number]] Collected: 04/30/24 1729    Order Status: Completed Specimen: Blood from Peripheral, Hand, Left Updated: 05/05/24 1832     Blood Culture, Routine No growth after 5 days.    Culture, Respiratory with Gram Stain [[id number]] Collected: 05/02/24 1240    Order Status: Completed Specimen: Endotracheal Updated: 05/05/24 0949     Respiratory Culture Reduced normal respiratory zach     Gram Stain (Respiratory) >10 epithelial cells per low power field     Gram Stain (Respiratory) No WBC's     Gram Stain (Respiratory) Rare Gram positive cocci    Blood culture [[id number]] Collected: 04/29/24 0628    Order Status: Completed Specimen: Blood from Peripheral, Hand, Right Updated: 05/04/24 0832     Blood Culture, Routine No growth after 5 days.    Culture, Respiratory with Gram Stain [[id number]] Collected: 05/02/24 1240    Order Status: Completed Specimen: Sputum from Endotracheal Aspirate Updated: 05/02/24 1643     Respiratory Culture Specimen inadequate - culture not performed     Gram Stain (Respiratory) >10 epithelial cells per low power field     Gram Stain (Respiratory) No WBC's     Gram Stain (Respiratory) Rare Gram positive cocci     Gram Stain (Respiratory) Predominance of oropharyngeal  zach. Please recollect.    Narrative:      Please have respiratory NT suction for sputum culture    Urine culture [[id number]] Collected: 04/29/24 1058    Order Status: Completed Specimen: Urine Updated: 05/02/24 0721     Urine Culture, Routine No growth    Narrative:      Specimen Source->Urine    MRSA Screen by PCR [[id number]] Collected: 04/30/24 1653    Order Status: Completed Specimen: Nasal Swab Updated: 04/30/24 1959     MRSA SCREEN BY PCR Negative    Respiratory Infection Panel (PCR), Nasopharyngeal [[id number]] Collected: 04/30/24 1728    Order Status: Completed Specimen: Nasopharyngeal Swab Updated: 04/30/24 1844     Respiratory Infection Panel Source NP swab     Adenovirus Not Detected     Coronavirus 229E, Common Cold Virus Not Detected     Coronavirus HKU1, Common Cold Virus Not Detected     Coronavirus NL63, Common Cold Virus Not Detected     Coronavirus OC43, Common Cold Virus Not Detected     Comment: Coronavirus strains 229E, HKU1, NL63, and OC43 can cause the common   cold   and are not associated with the respiratory disease outbreak caused   by  the COVID-19 (SARS-CoV-2 novel Coronavirus) strain.           SARS-CoV2 (COVID-19) Qualitative PCR Not Detected     Human Metapneumovirus Not Detected     Human Rhinovirus/Enterovirus Not Detected     Influenza A (subtypes H1, H1-2009,H3) Not Detected     Influenza B Not Detected     Parainfluenza Virus 1 Not Detected     Parainfluenza Virus 2 Not Detected     Parainfluenza Virus 3 Not Detected     Parainfluenza Virus 4 Not Detected     Respiratory Syncytial Virus Not Detected     Bordetella Parapertussis (EE1068) Not Detected     Bordetella pertussis (ptxP) Not Detected     Chlamydia pneumoniae Not Detected     Mycoplasma pneumoniae Not Detected     Comment: Respiratory Infection Panel testing performed by Multiplex PCR.       Narrative:      Respiratory Infection Panel source->NP Swab          CURRENT/PREVIOUS VISIT EKG  Results for orders placed or  performed during the hospital encounter of 04/25/24   EKG 12-lead    Collection Time: 04/30/24  2:38 PM   Result Value Ref Range    QRS Duration 62 ms    [facility] QTC Calculation 442 ms    Narrative    Test Reason : R07.9,    Vent. Rate : 079 BPM     Atrial Rate : 079 BPM     P-R Int : 140 ms          QRS Dur : 062 ms      QT Int : 386 ms       P-R-T Axes : 043 013 042 degrees     QTc Int : 442 ms    Normal sinus rhythm  Normal ECG  When compared with ECG of 25-APR-2024 12:25,  No significant change was found    Referred By: [email or web address]   SELF           Confirmed By:        Echocardiography Findings 4/30/24    Left Ventricle The left ventricle is normal in size. Normal wall thickness. Normal wall motion. There is normal systolic function with a visually estimated ejection fraction of 65 - 70%. There is normal diastolic function.   Right Ventricle Normal right ventricular cavity size. Wall thickness is normal. Right ventricle wall motion  is normal. Systolic function is normal.   Left Atrium Normal left atrial size.   Right Atrium Normal right atrial size.   Aortic Valve The aortic valve is structurally normal. There is normal leaflet mobility. Aortic valve peak velocity is 1.40 m/s. Mean gradient is 4 mmHg.   Mitral Valve The mitral valve is structurally normal. There is normal leaflet mobility. The mean pressure gradient across the mitral valve is 2 mmHg at a heart rate of  bpm. There is trace regurgitation.   Tricuspid Valve The tricuspid valve is structurally normal. There is normal leaflet mobility. There is trace regurgitation.   Pulmonic Valve The pulmonic valve is structurally normal. There is normal leaflet mobility.   IVC/SVC Normal venous pressure at 3 mmHg.   Ascending Aorta Aortic root is normal in size. Ascending aorta is normal measuring 2.60 cm.   Pericardium and Other Findings There is a trivial effusion. No indication of cardiac tamponade. Evidence includes no chamber collapse.   LVAD RV/LV ratio is 0.50.        Significant Imaging: I have reviewed all relevant and available imaging results/findings within the past 24 hours.    X-Ray Chest AP Portable 04/25/24 1323    Mild faint nonspecific infrahilar interstitial opacity, new from the previous exam suggesting very mild aspiration/bronchitis/pneumonia or trace edema in the appropriate clinical setting.    X-Ray Chest AP Portable 04/25/24 1504     Interval insertion of a nasogastric tube as described.    CT Chest Abdomen Pelvis Without Contrast  04/25/24 1610   1. Scattered nonspecific bronchiolitis in both lungs, with right lower lobes opacities potentially reflecting chronic bronchopneumonia, given similar appearance to CT of 12/11/2023.   2. Additional stable observations as described.    X-Ray Chest AP Portable 04/25/24 1815   No significant interval detrimental change in the radiographic appearance of the chest as compared with the prior exam on the same date, 04/25/2024.  Nasogastric tube in place.    X-Ray Chest AP Portable 04/26/24 1128    Mild hazy opacification of the right lung base with blunting of the costophrenic angle.  This could reflect atelectasis, infiltrate, pleural effusion or combination there of.    X-Ray Chest AP Portable 04/27/24 1059   Small right pleural effusion and right basilar airspace disease, worsened compared to prior.   Subsegmental atelectasis left lung base.   Interval removal of enteric tube.    X-Ray Abdomen AP 1 View04/30/24 1605   1. Unremarkable bowel gas pattern.   2. Percutaneous gastrostomy tube in the epigastric region.   3. Patchy hazy opacities of the right lung base could reflect atelectasis or infiltrate.      Roberta Samaniego NP  Date of Service: 05/06/2024      This note was created using [employer] voice recognition software that occasionally misinterpreted phrases or words.

## 2024-05-06 NOTE — ASSESSMENT & PLAN NOTE
Acute on chronic, worsening:  - hematology consulted: appreciate recommendations  - thought to be reactive from infection however thrombocytosis worsens while WBC, procal, and inflammatory markers improve

## 2024-05-06 NOTE — ACP (ADVANCE CARE PLANNING)
Critical [facility] hospital  Palliative Care   Social Work Visit      Patient Name: Steff Johnson  MRN: [id number]  Palliative Care Provider:   Primary Care Physician: Cristina Ren MD  Principal Problem:Severe sepsis    PC SW and Bria RN with PC went to pts room to address consult and told the pt hello. Pts MPOA was not present. We met with pts nurse and asked him to secure chat me once POA is present. We were told that she would be in the room between 10:30/ 11:00 am . We also attempted to call POA number on file but it is not able to be reached at this time. PC team is following .     PC team returned to room again and still no POA available. PC remains available.     11:52- SW spoke to PTs daughter and HPOA Leonie Johnson and arranged an in person meeting tomorrow around 10:30-11 am at patients bedside. She is interested in how she can get the patient home but will need to talk about the reality of how she can make that happen. I told her we would discuss that option in the am. Palliative team following.     Brittani Hanson LCSW, BACS

## 2024-05-06 NOTE — ASSESSMENT & PLAN NOTE
Chronic.    Temp:  [97.4 °F (36.3 °C)-98.4 °F (36.9 °C)]   Pulse:  [76-91]   Resp:  [16-20]   BP: (123-151)/(60-67)   SpO2:  [95 %-98 %] .   Home meds for hypertension were reviewed and noted below.   Hypertension Medications               amLODIPine (NORVASC) 10 MG tablet 1 tablet (10 mg total) by Per G Tube route once daily.    furosemide (LASIX) 40 MG tablet 40 mg by Per G Tube route once daily.    losartan (COZAAR) 100 MG tablet 1 tablet (100 mg total) by Per G Tube route once daily.    metoprolol tartrate (LOPRESSOR) 25 MG tablet 25 mg by Per G Tube route 2 (two) times daily.    cloNIDine (CATAPRES) 0.1 MG tablet 1 tablet (0.1 mg total) by Per G Tube route 3 (three) times daily.          Will order clonidine 0.1 q8h prn only in case pt went into rebound HTN.5/1: pt consistently hypertensive added home norvasc and metoprolol

## 2024-05-06 NOTE — ASSESSMENT & PLAN NOTE
History of chronic Mathur catheter  Catheter changed on 04/25/24   Urine culture collected 4/23 (+) Klebsiella pneumoniae (/MDRO) and Providencia stuartii  Urine culture collected 4/29 negative so far  Continue ceftazidime-avibactam (Started 4/29)  UA collected 4/29 improved when compared to UA collected 4/25: Bact- few (4/29),Many (4/25)/ Color- yellow (was red 4/25)/ Blood- 1+ (was 3+)/ still with pyuria and 3+ Leuk Est/ Remains Nitrite neg  Repeat urine negative  Completed Avycaz

## 2024-05-06 NOTE — PLAN OF CARE
Problem: Infection  Goal: Absence of Infection Signs and Symptoms  Outcome: Progressing     Problem: Adult Inpatient Plan of Care  Goal: Plan of Care Review  Outcome: Progressing  Goal: Patient-Specific Goal (Individualized)  Outcome: Progressing  Goal: Absence of Hospital-Acquired Illness or Injury  Outcome: Progressing  Goal: Optimal Comfort and Wellbeing  Outcome: Progressing  Goal: Readiness for Transition of Care  Outcome: Progressing     Problem: Diabetes Comorbidity  Goal: Blood Glucose Level Within Targeted Range  Outcome: Progressing     Problem: Sepsis/Septic Shock  Goal: Optimal Coping  Outcome: Progressing  Goal: Absence of Bleeding  Outcome: Progressing  Goal: Blood Glucose Level Within Targeted Range  Outcome: Progressing  Goal: Absence of Infection Signs and Symptoms  Outcome: Progressing  Goal: Optimal Nutrition Intake  Outcome: Progressing     Problem: Acute Kidney Injury/Impairment  Goal: Fluid and Electrolyte Balance  Outcome: Progressing  Goal: Improved Oral Intake  Outcome: Progressing  Goal: Effective Renal Function  Outcome: Progressing     Problem: Wound  Goal: Optimal Coping  Outcome: Progressing  Goal: Optimal Functional Ability  Outcome: Progressing  Goal: Absence of Infection Signs and Symptoms  Outcome: Progressing  Goal: Improved Oral Intake  Outcome: Progressing  Goal: Optimal Pain Control and Function  Outcome: Progressing  Goal: Skin Health and Integrity  Outcome: Progressing  Goal: Optimal Wound Healing  Outcome: Progressing     Problem: Skin Injury Risk Increased  Goal: Skin Health and Integrity  Outcome: Progressing     Problem: Fall Injury Risk  Goal: Absence of Fall and Fall-Related Injury  Outcome: Progressing     Problem: Coping Ineffective  Goal: Effective Coping  Outcome: Progressing

## 2024-05-07 LAB
ALBUMIN SERPL BCP-MCNC: 2.8 G/DL (ref 3.5–5.2)
ALP SERPL-CCNC: 129 U/L (ref 55–135)
ALT SERPL W/O P-5'-P-CCNC: 23 U/L (ref 10–44)
ANION GAP SERPL CALC-SCNC: 9 MMOL/L (ref 8–16)
AST SERPL-CCNC: 23 U/L (ref 10–40)
BACTERIA BLD CULT: NORMAL
BASOPHILS # BLD AUTO: 0.14 K/UL (ref 0–0.2)
BASOPHILS NFR BLD: 0.8 % (ref 0–1.9)
BILIRUB SERPL-MCNC: 0.2 MG/DL (ref 0.1–1)
BUN SERPL-MCNC: 24 MG/DL (ref 8–23)
CALCIUM SERPL-MCNC: 8.5 MG/DL (ref 8.7–10.5)
CHLORIDE SERPL-SCNC: 106 MMOL/L (ref 95–110)
CO2 SERPL-SCNC: 20 MMOL/L (ref 23–29)
CREAT SERPL-MCNC: 0.8 MG/DL (ref 0.5–1.4)
CRP SERPL-MCNC: 2.9 MG/DL
DIFFERENTIAL METHOD BLD: ABNORMAL
EOSINOPHIL # BLD AUTO: 0.3 K/UL (ref 0–0.5)
EOSINOPHIL NFR BLD: 1.4 % (ref 0–8)
ERYTHROCYTE [DISTWIDTH] IN BLOOD BY AUTOMATED COUNT: 19.2 % (ref 11.5–14.5)
EST. GFR  (NO RACE VARIABLE): >60 ML/MIN/1.73 M^2
GLUCOSE SERPL-MCNC: 226 MG/DL (ref 70–110)
GLUCOSE SERPL-MCNC: 235 MG/DL (ref 70–110)
GLUCOSE SERPL-MCNC: 238 MG/DL (ref 70–110)
GLUCOSE SERPL-MCNC: 265 MG/DL (ref 70–110)
HCT VFR BLD AUTO: 26.8 % (ref 37–48.5)
HGB BLD-MCNC: 8.3 G/DL (ref 12–16)
IMM GRANULOCYTES # BLD AUTO: 0.47 K/UL (ref 0–0.04)
IMM GRANULOCYTES NFR BLD AUTO: 2.6 % (ref 0–0.5)
LYMPHOCYTES # BLD AUTO: 1.5 K/UL (ref 1–4.8)
LYMPHOCYTES NFR BLD: 8.5 % (ref 18–48)
MAGNESIUM SERPL-MCNC: 1.7 MG/DL (ref 1.6–2.6)
MCH RBC QN AUTO: 28 PG (ref 27–31)
MCHC RBC AUTO-ENTMCNC: 31 G/DL (ref 32–36)
MCV RBC AUTO: 91 FL (ref 82–98)
MONOCYTES # BLD AUTO: 1.5 K/UL (ref 0.3–1)
MONOCYTES NFR BLD: 8.5 % (ref 4–15)
NEUTROPHILS # BLD AUTO: 14.1 K/UL (ref 1.8–7.7)
NEUTROPHILS NFR BLD: 78.2 % (ref 38–73)
NRBC BLD-RTO: 0 /100 WBC
PLATELET # BLD AUTO: 846 K/UL (ref 150–450)
PMV BLD AUTO: 10.2 FL (ref 9.2–12.9)
POTASSIUM SERPL-SCNC: 4.5 MMOL/L (ref 3.5–5.1)
PROCALCITONIN SERPL IA-MCNC: 0.5 NG/ML (ref 0–0.5)
PROT SERPL-MCNC: 6.4 G/DL (ref 6–8.4)
RBC # BLD AUTO: 2.96 M/UL (ref 4–5.4)
SODIUM SERPL-SCNC: 135 MMOL/L (ref 136–145)
VANCOMYCIN TROUGH SERPL-MCNC: 17.5 UG/ML
WBC # BLD AUTO: 17.97 K/UL (ref 3.9–12.7)

## 2024-05-07 PROCEDURE — 86140 C-REACTIVE PROTEIN: CPT | Performed by: STUDENT IN AN ORGANIZED HEALTH CARE EDUCATION/TRAINING PROGRAM

## 2024-05-07 PROCEDURE — 94761 N-INVAS EAR/PLS OXIMETRY MLT: CPT

## 2024-05-07 PROCEDURE — 99900031 HC PATIENT EDUCATION (STAT)

## 2024-05-07 PROCEDURE — 63600175 PHARM REV CODE 636 W HCPCS: Performed by: NURSE PRACTITIONER

## 2024-05-07 PROCEDURE — 12000002 HC ACUTE/MED SURGE SEMI-PRIVATE ROOM

## 2024-05-07 PROCEDURE — 63600175 PHARM REV CODE 636 W HCPCS: Performed by: HOSPITALIST

## 2024-05-07 PROCEDURE — 25000242 PHARM REV CODE 250 ALT 637 W/ HCPCS: Performed by: HOSPITALIST

## 2024-05-07 PROCEDURE — 84145 PROCALCITONIN (PCT): CPT | Performed by: STUDENT IN AN ORGANIZED HEALTH CARE EDUCATION/TRAINING PROGRAM

## 2024-05-07 PROCEDURE — 83735 ASSAY OF MAGNESIUM: CPT | Performed by: HOSPITALIST

## 2024-05-07 PROCEDURE — 36415 COLL VENOUS BLD VENIPUNCTURE: CPT | Performed by: HOSPITALIST

## 2024-05-07 PROCEDURE — 25000003 PHARM REV CODE 250

## 2024-05-07 PROCEDURE — 94640 AIRWAY INHALATION TREATMENT: CPT

## 2024-05-07 PROCEDURE — 63600175 PHARM REV CODE 636 W HCPCS

## 2024-05-07 PROCEDURE — 36410 VNPNXR 3YR/> PHY/QHP DX/THER: CPT

## 2024-05-07 PROCEDURE — 25000003 PHARM REV CODE 250: Performed by: INTERNAL MEDICINE

## 2024-05-07 PROCEDURE — 82668 ASSAY OF ERYTHROPOIETIN: CPT | Performed by: INTERNAL MEDICINE

## 2024-05-07 PROCEDURE — C1751 CATH, INF, PER/CENT/MIDLINE: HCPCS

## 2024-05-07 PROCEDURE — 63600175 PHARM REV CODE 636 W HCPCS: Performed by: INTERNAL MEDICINE

## 2024-05-07 PROCEDURE — 85025 COMPLETE CBC W/AUTO DIFF WBC: CPT | Performed by: HOSPITALIST

## 2024-05-07 PROCEDURE — 25000003 PHARM REV CODE 250: Performed by: NURSE PRACTITIONER

## 2024-05-07 PROCEDURE — C9113 INJ PANTOPRAZOLE SODIUM, VIA: HCPCS | Performed by: INTERNAL MEDICINE

## 2024-05-07 PROCEDURE — 25000003 PHARM REV CODE 250: Performed by: HOSPITALIST

## 2024-05-07 PROCEDURE — 80053 COMPREHEN METABOLIC PANEL: CPT | Performed by: HOSPITALIST

## 2024-05-07 PROCEDURE — 84207 ASSAY OF VITAMIN B-6: CPT | Performed by: INTERNAL MEDICINE

## 2024-05-07 PROCEDURE — 36415 COLL VENOUS BLD VENIPUNCTURE: CPT | Performed by: INTERNAL MEDICINE

## 2024-05-07 PROCEDURE — 80202 ASSAY OF VANCOMYCIN: CPT | Performed by: HOSPITALIST

## 2024-05-07 RX ADMIN — Medication 800 MG: at 09:05

## 2024-05-07 RX ADMIN — INSULIN ASPART 6 UNITS: 100 INJECTION, SOLUTION INTRAVENOUS; SUBCUTANEOUS at 09:05

## 2024-05-07 RX ADMIN — PANTOPRAZOLE SODIUM 40 MG: 40 INJECTION, POWDER, FOR SOLUTION INTRAVENOUS at 09:05

## 2024-05-07 RX ADMIN — APIXABAN 2.5 MG: 2.5 TABLET, FILM COATED ORAL at 09:05

## 2024-05-07 RX ADMIN — FOLIC ACID 1000 MCG: 1 TABLET ORAL at 06:05

## 2024-05-07 RX ADMIN — LEVETIRACETAM 500 MG: 5 INJECTION INTRAVENOUS at 09:05

## 2024-05-07 RX ADMIN — METOPROLOL TARTRATE 25 MG: 25 TABLET, FILM COATED ORAL at 09:05

## 2024-05-07 RX ADMIN — IPRATROPIUM BROMIDE AND ALBUTEROL SULFATE 3 ML: 2.5; .5 SOLUTION RESPIRATORY (INHALATION) at 07:05

## 2024-05-07 RX ADMIN — ASPIRIN 81 MG 81 MG: 81 TABLET ORAL at 09:05

## 2024-05-07 RX ADMIN — SCOPALAMINE 1 PATCH: 1 PATCH, EXTENDED RELEASE TRANSDERMAL at 12:05

## 2024-05-07 RX ADMIN — THIAMINE HCL TAB 100 MG 100 MG: 100 TAB at 09:05

## 2024-05-07 RX ADMIN — ACETAMINOPHEN 650 MG: 325 TABLET ORAL at 09:05

## 2024-05-07 RX ADMIN — VANCOMYCIN HYDROCHLORIDE 125 MG: KIT at 03:05

## 2024-05-07 RX ADMIN — LEVOFLOXACIN 750 MG: 5 INJECTION, SOLUTION INTRAVENOUS at 03:05

## 2024-05-07 RX ADMIN — IPRATROPIUM BROMIDE AND ALBUTEROL SULFATE 3 ML: 2.5; .5 SOLUTION RESPIRATORY (INHALATION) at 08:05

## 2024-05-07 RX ADMIN — LATANOPROST 1 DROP: 50 SOLUTION/ DROPS OPHTHALMIC at 09:05

## 2024-05-07 RX ADMIN — ATORVASTATIN CALCIUM 80 MG: 40 TABLET, FILM COATED ORAL at 09:05

## 2024-05-07 RX ADMIN — HYDRALAZINE HYDROCHLORIDE 10 MG: 20 INJECTION INTRAMUSCULAR; INTRAVENOUS at 12:05

## 2024-05-07 RX ADMIN — ALLOPURINOL 100 MG: 100 TABLET ORAL at 09:05

## 2024-05-07 RX ADMIN — AMLODIPINE BESYLATE 10 MG: 5 TABLET ORAL at 09:05

## 2024-05-07 RX ADMIN — VANCOMYCIN HYDROCHLORIDE 1000 MG: 1 INJECTION, POWDER, LYOPHILIZED, FOR SOLUTION INTRAVENOUS at 03:05

## 2024-05-07 RX ADMIN — LEVETIRACETAM 500 MG: 5 INJECTION INTRAVENOUS at 08:05

## 2024-05-07 RX ADMIN — IPRATROPIUM BROMIDE AND ALBUTEROL SULFATE 3 ML: 2.5; .5 SOLUTION RESPIRATORY (INHALATION) at 01:05

## 2024-05-07 RX ADMIN — VANCOMYCIN HYDROCHLORIDE 125 MG: KIT at 09:05

## 2024-05-07 NOTE — CONSULTS
Hematology/Oncology  Inpatient Consult Note  5/6/24  Patient Name: Steff Johnson  MRN: [id number]  Admission Date: 4/25/2024  Hospital Length of Stay: 11 days  Code Status: Full Code   Attending Provider: Jose A Escoto MD  Referring Provider: KLARISSA Scales  Consulting Provider: PARUL Crawley MD  Primary Care Physician: Cristina Ren MD  Principal Problem:Severe sepsis    Inpatient consult to Hematology/Oncology  Consult performed by: PARUL Crawley MD  Consult ordered by: Lizett Scales PA-C      Consultation  Julio Crawley MD 5/6/24  Subjective:     Chief Complaint: GI Problem (Brown fluid being withdrawn from peg tube, vomiting brown fluid. Smells of feces. Since this morning. Pt is nonverbal)        History Present Illness:  64 y.o. female with sx of UGI bleeding from Peg tube, suspected sepsis.    Hx CVA with nonverbal state, COPD, DM HTN    Asked to evaluate thrombocytosis.  700,000's.      Past Medical/Surgical History:  Past Medical History:   Diagnosis Date    Arthritis     Complete tear of left rotator cuff 11/09/2017    COPD (chronic obstructive pulmonary disease)     COVID-19 infection 07/02/2021    Diabetes mellitus     H/o Cerebrovascular accident (CVA) of left pontine structure 12/12/2019    Hypertension     Personal history of colonic polyps     Stroke     Wears glasses      Past Surgical History:   Procedure Laterality Date    COLONOSCOPY N/A 4/11/2017    Procedure: COLONOSCOPY;  Surgeon: Jared Antonio MD;  Location: [facility];  Service: Endoscopy;  Laterality: N/A;    CYSTOSCOPY W/ RETROGRADES N/A 12/14/2023    Procedure: CYSTOSCOPY, WITH RETROGRADE PYELOGRAM;  Surgeon: Sergio Soria MD;  Location: [facility] OR;  Service: Urology;  Laterality: N/A;    ECHOCARDIOGRAM,TRANSESOPHAGEAL N/A 12/12/2023    Procedure: Transesophageal echo (GT) intra-procedure log documentation;  Surgeon: Antoine Rivera MD;  Location: [facility] CATH/EP LAB;  Service: Cardiology;  Laterality: N/A;     ESOPHAGOGASTRODUODENOSCOPY N/A 2/25/2024    Procedure: EGD (ESOPHAGOGASTRODUODENOSCOPY);  Surgeon: Alexandru May MD;  Location: [facility] ENDO;  Service: Endoscopy;  Laterality: N/A;    ESOPHAGOGASTRODUODENOSCOPY N/A 4/26/2024    Procedure: EGD (ESOPHAGOGASTRODUODENOSCOPY);  Surgeon: Julien Escobar III, MD;  Location: [facility] ENDO;  Service: Endoscopy;  Laterality: N/A;    HYSTERECTOMY      INSERTION OF CATHETER N/A 12/14/2023    Procedure: INSERTION, CATHETER;  Surgeon: Sergio Soria MD;  Location: [facility] OR;  Service: Urology;  Laterality: N/A;    OOPHORECTOMY      SHOULDER SURGERY      R    TRANSESOPHAGEAL ECHOCARDIOGRAM WITH POSSIBLE CARDIOVERSION (GT W/ POSS CARDIOVERSION) N/A 5/4/2023    Procedure: Transesophageal echo (GT) intra-procedure log documentation;  Surgeon: Blade Phillip MD;  Location: [facility] CATH/EP LAB;  Service: Cardiology;  Laterality: N/A;       Allergies:  Review of patient's allergies indicates:  No Known Allergies    Social/Family History:  Social History     Socioeconomic History    Marital status: Single   Occupational History     Comment: disabled due to shoulder problems   Tobacco Use    Smoking status: Never    Smokeless tobacco: Never   Substance and Sexual Activity    Alcohol use: No    Drug use: No    Sexual activity: Not Currently     Social Determinants of Health     Financial Resource Strain: Patient Unable To Answer (4/27/2024)    Overall Financial Resource Strain (CARDIA)     Difficulty of Paying Living Expenses: Patient unable to answer   Food Insecurity: Patient Unable To Answer (4/27/2024)    Hunger Vital Sign     Worried About Running Out of Food in the Last Year: Patient unable to answer     Ran Out of Food in the Last Year: Patient unable to answer   Transportation Needs: Patient Unable To Answer (4/27/2024)    PRAPARE - Transportation     Lack of Transportation (Medical): Patient unable to answer     Lack of Transportation (Non-Medical): Patient unable to  answer   Physical Activity: Sufficiently Active (12/6/2023)    Exercise Vital Sign     Days of Exercise per Week: 5 days     Minutes of Exercise per Session: 150+ min   Stress: Patient Unable To Answer (4/27/2024)    [employer] [place] [employer] - Occupational Stress Questionnaire     Feeling of Stress : Patient unable to answer   Housing Stability: Patient Unable To Answer (4/27/2024)    Housing Stability Vital Sign     Unable to Pay for Housing in the Last Year: Patient unable to answer     Homeless in the Last Year: Patient unable to answer     Family History   Problem Relation Name Age of Onset    Diabetes Mother      Asthma Mother      Hypertension Mother      Diabetes Father      Diabetes Brother      Hypertension Brother      Anemia Daughter      Glaucoma Neg Hx      Macular degeneration Neg Hx      Retinal detachment Neg Hx         ROS:    GEN: See HPI  HEENT:See HPI  CV: normal with no CP, SOB, PND, SEGUNDO or orthopnea  PULM: normal with no SOB, cough, hemoptysis, sputum or pleuritic pain  GI: See HPI  : normal with no hematuria, dysuria  BREAST: normal with no mass, discharge, pain  SKIN: normal with no rash, erythema, bruising, or swelling        Medications:  Continuous Infusions:  Current Facility-Administered Medications   Medication Dose Route Frequency Last Rate Last Admin     Scheduled Meds:  Current Facility-Administered Medications   Medication Dose Route Frequency    albuterol-ipratropium  3 mL Nebulization Q6H WAKE    allopurinoL  100 mg Per G Tube Daily    amLODIPine  10 mg Per G Tube Daily    apixaban  2.5 mg Per G Tube BID    aspirin  81 mg Per G Tube Daily    atorvastatin  80 mg Per G Tube QHS    folic acid  1,000 mcg Per G Tube QAM    insulin detemir U-100  18 Units Subcutaneous QHS    latanoprost  1 drop Both Eyes QHS    levETIRAcetam (Keppra) IV (PEDS and ADULTS)  500 mg Intravenous Q12H    levoFLOXacin  750 mg Intravenous Q24H    metoprolol tartrate  25 mg Per G Tube BID     "pantoprazole  40 mg Intravenous BID    scopolamine  1 patch Transdermal Q3 Days    thiamine  100 mg Per G Tube Daily    vancomycin (VANCOCIN) IV (PEDS and ADULTS)  1,000 mg Intravenous Q24H     PRN Meds:  Current Facility-Administered Medications:     0.9%  NaCl infusion (for blood administration), , Intravenous, Q24H PRN    acetaminophen, 650 mg, Oral, Q4H PRN    aluminum-magnesium hydroxide-simethicone, 30 mL, Oral, QID PRN    [COMPLETED] calcium gluconate IVPB, 1 g, Intravenous, ED 1 Time **AND** calcium gluconate IVPB, 1 g, Intravenous, Q10 Min PRN    cloNIDine, 0.1 mg, Oral, Q8H PRN    dextrose 50%, 12.5 g, Intravenous, PRN    dextrose 50%, 25 g, Intravenous, PRN    glucagon (human recombinant), 1 mg, Intramuscular, PRN    glucose, 16 g, Oral, PRN    glucose, 24 g, Oral, PRN    hydrALAZINE, 10 mg, Intravenous, Q6H PRN    HYDROcodone-acetaminophen, 1 tablet, Oral, Q6H PRN    insulin aspart U-100, 0-10 Units, Subcutaneous, Q6H PRN    magnesium oxide, 800 mg, Oral, PRN    magnesium oxide, 800 mg, Oral, PRN    melatonin, 6 mg, Oral, Nightly PRN    naloxone, 0.02 mg, Intravenous, PRN    ondansetron, 4 mg, Intravenous, Q6H PRN    potassium bicarbonate, 35 mEq, Oral, PRN    potassium bicarbonate, 50 mEq, Oral, PRN    potassium bicarbonate, 60 mEq, Oral, PRN    potassium, sodium phosphates, 2 packet, Oral, PRN    potassium, sodium phosphates, 2 packet, Oral, PRN    potassium, sodium phosphates, 2 packet, Oral, PRN    sodium chloride 0.9%, 10 mL, Intravenous, Q12H PRN    Pharmacy to dose Vancomycin consult, , , Once **AND** vancomycin - pharmacy to dose, , Intravenous, pharmacy to manage frequency     Objective:     Vitals:  Blood pressure (!) 148/69, pulse 82, temperature 97.7 °F (36.5 °C), temperature source Axillary, resp. rate 16, height 4' 11" (1.499 m), weight 53.5 kg (118 lb), SpO2 (!) 94%.    Physical Exam:  GEN: chronically ill, debilitated.  HEAD: atraumatic and normocephalic  EYES: + pallor, no icterus  ENT:  " no pharyngeal erythema, external ears WNL; no nasal discharge  NECK: no masses, thyroid normal  CV: RRR with no murmur; normal pulse  CHEST: Normal respiratory effort; CTAB; distant breath sounds; no wheeze or crackles  ABDOM: nontender and nondistended; soft  MUSC/Skeletal:joint contractures  EXTREM: contracted limbs  SKIN: no rashes, lesions, petechia  NEURO: non verbal  PSYCH: not assessed  LYMPH: normal cervical, supraclavicular LN's    WBC 20,000, Left shift, Hgb 7.9, MCV 85, plt cnt 772,000.  Creatinine 0.9., LFTs NL, Calcium 8.5, T protein 6.3.  C/S NG  CXR multifocal pneumonia  KUB no SBO seen    Several stools have been heme +.  KERLINE-2 negative.  B 12 consistently > 400 on last 3 occasions.  At one time Folate was low at 6.6, she was placed on folic acid supplimentation, when last checked folate was 18.    Over last 7 years, ferritin 487 to 886 in 12/2024.  EPO was 83 in the past.  Retic 2.0.      Assessment/Plan:     (1) 64 y.o. female with leukocytosis, leukemoid reaction likely,  In setting of pneumonia, sepsis?    (2)Moderate anemia, previous evaluation does not support Fe deficiency or B 12 deficiency anemia.  Folate was low in the past, but now normalized.    Check B 6.    (3)Thrombocytosis may be a reactive thrombocytosis.  Or secondary to primary bone marrow disorder.    (4)Myeloproliferative Syndrome and myelodysplastic disorder is in the differential.  She is not a candidate for Bone Marrow exam.     (5)Recheck Epo level.  If Epo is consistently increased, then she is less likely to respond to Procrit or Retacrit trial    (6)She may be transfusion dependant chronically.        Active Diagnoses:    Diagnosis Date Noted POA    PRINCIPAL PROBLEM:  Severe sepsis [A41.9, R65.20] 04/25/2024 Yes    Quadriplegia [G82.50] 04/27/2024 Yes     Chronic    GI hemorrhage [K92.2] 04/25/2024 Yes    Acute blood loss anemia [D62] 04/25/2024 Yes    Complicated UTI (urinary tract infection) [N39.0] 04/25/2024 Yes     Clostridium difficile infection [A49.8] 04/25/2024 Yes    Hypertension [I10] 04/25/2024 Yes    Type 2 diabetes mellitus with neurologic complication, with long-term current use of insulin [E11.49, Z79.4] 04/25/2024 Not Applicable    History of ischemic stroke [Z86.73] 04/25/2024 Not Applicable    Seizure disorder [G40.909] 02/23/2024 Yes    Bacteremia [R78.81] 12/07/2023 Yes    Sacral decubitus ulcer, stage III [L89.153] 10/25/2023 Yes    Thrombocytosis [D75.839] 10/25/2023 Yes    Aphasia [R47.01] 05/31/2023 Yes      Problems Resolved During this Admission:    Diagnosis Date Noted Date Resolved POA    Hematuria [R31.9] 04/25/2024 04/30/2024 Yes       PARUL Crawley MD  Hematology/Oncology  [facility]   5/6/24

## 2024-05-07 NOTE — NURSING
Pt. Seen for F/U Wound Care.    Cleansed Sacrum and Left Calf  with NS and gauze.  Patted dry with gauze. Applied Medihoney and covered with Adhesive Bordered Foam Dressing. Isabella. Well.

## 2024-05-07 NOTE — PROGRESS NOTES
Formerly [facility] Medicine  Progress Note    Patient Name: Steff Johnson  MRN: [id number]  Patient Class: IP- Inpatient   Admission Date: 4/25/2024  Length of Stay: 12 days  Attending Physician: Jose A Escoto MD  Primary Care Provider: Cristina Ren MD        Subjective:     Principal Problem:Severe sepsis        HPI:  65 yo female with PMH of CVA with known baseline confusion and aphasia who was sent to the ER because of black drainage coming from around the PEG tube site and having black vomit.     On presentation to the ER, vitals showed 101/59, HR of 107, RR of 25 and fever of 102.3, spo2 of 95%. Cbc with wbc of 28 and anemia of 8.8. CMP showed acute renal failure with creatinine of of 2.5, anion gap of 19, hyponatermia of 133, potassium of 5.9. lactic acid of 6.1. Pt started to have projectile hematemesis. NGT was placed. Mathur was replaced after which pt developed hematuria. UA was grossly positive. C.diffi antigen is positive. EKG showed sinus tachycardia. CT C/A/P showed scattered nonspecific bronchiolitis in both lungs, with right lower lobes opacities potentially reflecting chronic bronchopneumonia, given similar appearance to CT of 12/11/2023. Pt was given the hyperkalemia procotol, 1.6 L of fluids, vanco/zosyn, and protonix 80 mg iv. She will be admitted to medicine for further management.     Overview/Hospital Course:  Ms. Johnson is a resident of a local nursing home she was admitted for GI hemorrhage, severe sepsis, COURTNEY, UTI, and C. Diff. while here, her labs and vital signs were monitored and she was maintained on telemetry.  He was placed on IV vancomycin, Merrem, and vancomycin per PEG while awaiting culture results.  Infectious Disease was consulted to assist with management.  She has a chronic Mathur catheter, which was replaced, and she initially developed hematuria, likely from the trauma of the Mathur catheter being inserted, which did resolve.  She was placed on IV  Protonix and GI was consulted.  She underwent an EGD which was negative for any bleeding and showed moderate/severe distal esophagitis and non-erosive gastritis.  Her anticoagulants, aspirin and Eliquis, were then resumed.  She initially had an NG tube, which was discontinued at some point.  Her continuous tube feedings via PEG were resume and she has tolerated.  She was placed on IV fluids her COURTNEY and her renal function improved.  Her urine culture started grew out Klebsiella (/MDRO) and Providencia stuartii and the Merrem was discontinued and she was started on ceftazidime-avibactam.  Her blood cultures were also positive with Staphylococcus haemolyticus and she was continued on IV vancomycin.  Her stool studies were negative for acute C diff infection however she continued on vancomycin per PEG empirically due to her risk for developing C diff. Repeat urine and blood cultures were collected.  He continued to have loose stools, which may have been due to the continuous tube feedings, and she was started on acidophilus per PEG.  She has a chronic sacral decubitus and Wound Care was consulted and followed her for her wound management.  Her wound did not appear to be contributory to her sepsis.                Interval History:  Patient seen and examined. Overnight notes reviewed.  WBC improving on am labs. Afebrile.IV abx. Repeat blood cultures from 04/30 and 5/2 negative. ID following. Leukocytosis persists on am labs but improving. ID and hematology following. Palliative care consulted and pt made DNR.      Review of Systems   Unable to perform ROS: Patient nonverbal     Objective:     Vital Signs (Most Recent):  Temp: 98.3 °F (36.8 °C) (05/07/24 1558)  Pulse: 88 (05/07/24 1558)  Resp: 18 (05/07/24 1558)  BP: (!) 160/74 (05/07/24 1558)  SpO2: 95 % (05/07/24 1558) Vital Signs (24h Range):  Temp:  [97.8 °F (36.6 °C)-98.5 °F (36.9 °C)] 98.3 °F (36.8 °C)  Pulse:  [77-88] 88  Resp:  [16-18] 18  SpO2:  [94 %-98 %] 95  %  BP: (143-183)/(60-82) 160/74     Weight: 53.5 kg (118 lb)  Body mass index is 23.83 kg/m².    Intake/Output Summary (Last 24 hours) at 5/7/2024 1706  Last data filed at 5/7/2024 1624  Gross per 24 hour   Intake 1000 ml   Output 2400 ml   Net -1400 ml         Physical Exam  Vitals and nursing note reviewed.   Constitutional:       Appearance: Normal appearance. She is normal weight. She is ill-appearing (chronically).   HENT:      Head: Normocephalic and atraumatic.      Mouth/Throat:      Mouth: Mucous membranes are moist.      Pharynx: Oropharynx is clear.   Eyes:      Extraocular Movements: Extraocular movements intact.   Cardiovascular:      Rate and Rhythm: Normal rate and regular rhythm.      Pulses: Normal pulses.      Heart sounds: Normal heart sounds.   Pulmonary:      Effort: Pulmonary effort is normal.      Breath sounds: Decreased breath sounds present.   Abdominal:      General: Abdomen is flat. Bowel sounds are normal.      Palpations: Abdomen is soft.      Tenderness: There is no abdominal tenderness. There is no guarding or rebound.      Comments: G tube in place.    Genitourinary:     Comments: Mathur catheter in place.   Rectal tube with stool noted.   Neurological:      Mental Status: She is alert. Mental status is at baseline.      Comments: Patient was alert and more interactive today.  Able to mouth yes or no to simple questions.             Significant Labs: All pertinent labs within the past 24 hours have been reviewed.  CBC:   Recent Labs   Lab 05/06/24  0609 05/07/24  0607   WBC 20.40* 17.97*   HGB 7.9* 8.3*   HCT 24.4* 26.8*   * 846*     CMP:   Recent Labs   Lab 05/06/24  0609 05/07/24  0607    135*   K 4.4 4.5    106   CO2 23 20*   * 235*   BUN 24* 24*   CREATININE 0.9 0.8   CALCIUM 8.5* 8.5*   PROT 6.3 6.4   ALBUMIN 2.6* 2.8*   BILITOT 0.2 0.2   ALKPHOS 126 129   AST 21 23   ALT 25 23   ANIONGAP 8 9       Significant Imaging: I have reviewed all pertinent  "imaging results/findings within the past 24 hours.    CXR:  Bilateral lower lung opacities suspicious for multifocal pneumonia, right greater than left, having progressed compared to prior exams.     Assessment/Plan:      * Severe sepsis  This patient does have evidence of infective focus  My overall impression is  severe sepsis :  Evidenced by leukocytosis, fever, current infection, and lactic acidosis.  Source: Urinary Tract  Antibiotics given-   Antibiotics (72h ago, onward)      Start     Stop Route Frequency Ordered    05/07/24 0900  vancomycin 125 mg/5 mL oral solution 125 mg         -- PER G TUBE 2 times daily 05/07/24 0836    05/05/24 1400  vancomycin in dextrose 5 % 1 gram/250 mL IVPB 1,000 mg        Note to Pharmacy: Ht: 4' 11" (1.499 m)  Wt: 53.5 kg (118 lb)  Estimated Creatinine Clearance: 47.4 mL/min (based on SCr of 0.9 mg/dL).  Body mass index is 23.83 kg/m².    -- IV Every 24 hours (non-standard times) 05/05/24 0830    05/02/24 1400  levoFLOXacin 750 mg/150 mL IVPB 750 mg         -- IV Every 24 hours (non-standard times) 05/02/24 1250    04/30/24 1653  vancomycin - pharmacy to dose  (vancomycin IVPB (PEDS and ADULTS))        Placed in "And" Linked Group    -- IV pharmacy to manage frequency 04/30/24 1554    04/29/24 0800  cefTAZidime-avibactam (AVYCAZ) 1.25 g in dextrose 5 % (D5W) 100 mL         05/06/24 0959 IV Every 8 hours (non-standard times) 04/29/24 0046          Latest lactate reviewed-  No results for input(s): "LACTATE", "POCLAC" in the last 72 hours.    Organ dysfunction indicated by Acute kidney injury-- which has resolved    Fluid challenge Not needed - patient is not hypotensive      Post- resuscitation assessment No - Post resuscitation assessment not needed       Will Not start Pressors- Levophed for MAP of 65  Source control achieved by:  IV antibiotics  ---------------------------------------------------------------  It is noted that patient has history of recurrent episodes of " sepsis due to complicated UTI.  Awaiting urine culture results  Procalcitonin 8.983.  Not sure if this level is improved since an initial level was not obtained when patient was admitted.  Highest Procalcitonin was 133 with patient's last episode of sepsis 12/2/23.  Lactic acidosis resolved  Urine culture positive Klebsiella (/MDRO) and Providencia stuartii/ repeat urine culture negative so far  Blood cultures collected 4/25 (+) Staphylococcus haemolyticus/ repeat blood cultures collected 4/29 negative so far  Stool negative for acute C diff infection  Continue IV vancomycin and ceftazidime-avibactam   Continue IV fluid hydration  Fevers are slowly improving. Had temp 202.2 4/28 @ 2245.  First dose of ceftazidime-avibactam given 4/29 @ 0035.  T-max since starting new antibiotic was 100.9 @ 4/29 0420.  Last antipyretic given 4/29 @2130.  GI PCR + for salmonella and abx adjusted to vanc, avycaz, and Levaquin 5/2.   5/3: WBC increasing on am labs; CXR with worsening infiltrates; discussed case with the patient's POA at length; will consult palliative Care; at this time POA wishes for patient's remained full code  5/4: leukocytosis; ID following; IV abx; discussed case with pt's brother in person and POA via phone.   5/5: leukocytosis; ID following; on IV vanc, Avycaz, and Levaquin; palliative care consult; updated patient's POA on phone.   5/6: leukocytosis; ID following; Avycaz completed; IV vanc and Levaquin; awaiting palliative care and heme consult; called POA no answer and left VM.  5/7: ID final recs:  Vancomycin one gram IV every 24 hours through 5/14. Levofloxacin 750 mg IV daily through 5/8. Vancomycin oral 125 mg per PEG tube twice daily through May 14th      Bacteremia  Blood cultures collected 4/25 (+) Staphylococcus haemolyticus  Repeat blood cultures collected 4/30 and 5/2 negative so far   Continue IV vancomycin  ID final recs: Vancomycin one gram IV every 24 hours through 5/14  Levofloxacin 750 mg IV  "daily through 5/8   Vancomycin oral 125 mg per PEG tube twice daily through May 14th    Encephalopathy chronic        Quadriplegia  Chronic/due to history of CVA  Continue pressure relief measures      History of ischemic stroke  Continue prevention: statin, ASA, Eliquis     Acute kidney injury  Essentially resolved/creatinine normal.    Patient with acute kidney injury/acute renal failure likely due to pre-renal azotemia due to IVVD 2/2 GIB and Sepsis. COURTNEY is currently stable. Baseline creatinine  0.7  - Labs reviewed- Renal function/electrolytes with Estimated Creatinine Clearance: 60.9 mL/min (based on SCr of 0.7 mg/dL). according to latest data. Monitor urine output and serial BMP and adjust therapy as needed. Avoid nephrotoxins and renally dose meds for GFR listed above.      Type 2 diabetes mellitus with neurologic complication, with long-term current use of insulin  Patient's FSGs are uncontrolled due to hyperglycemia on current medication regimen.  Last A1c reviewed-   Lab Results   Component Value Date    HGBA1C 7.8 (H) 12/05/2023     Most recent fingerstick glucose reviewed- No results for input(s): "POCTGLUCOSE" in the last 24 hours.  Current correctional scale  Low  Increase anti-hyperglycemic dose as follows-   Antihyperglycemics (From admission, onward)      Start     Stop Route Frequency Ordered    04/30/24 2100  insulin detemir U-100 (Levemir) pen 18 Units         -- SubQ Nightly 04/30/24 1550    04/30/24 1649  insulin aspart U-100 pen 0-10 Units         -- SubQ Every 6 hours PRN 04/30/24 1550          Hold Oral hypoglycemics while patient is in the hospital.  -----------------------------------------------  Increase basal insulin to 18 units nightly and Increase SSI to medium dose      Hypertension  Chronic.    Temp:  [97.4 °F (36.3 °C)-98.4 °F (36.9 °C)]   Pulse:  [76-91]   Resp:  [16-20]   BP: (123-151)/(60-67)   SpO2:  [95 %-98 %] .   Home meds for hypertension were reviewed and noted below. "   Hypertension Medications               amLODIPine (NORVASC) 10 MG tablet 1 tablet (10 mg total) by Per G Tube route once daily.    furosemide (LASIX) 40 MG tablet 40 mg by Per G Tube route once daily.    losartan (COZAAR) 100 MG tablet 1 tablet (100 mg total) by Per G Tube route once daily.    metoprolol tartrate (LOPRESSOR) 25 MG tablet 25 mg by Per G Tube route 2 (two) times daily.    cloNIDine (CATAPRES) 0.1 MG tablet 1 tablet (0.1 mg total) by Per G Tube route 3 (three) times daily.          Will order clonidine 0.1 q8h prn only in case pt went into rebound HTN.5/1: pt consistently hypertensive added home norvasc and metoprolol     Clostridium difficile infection  Stool positive C diff antigen, negative toxins, Negative C. Diff PCR---> not acute C. Diff infection  ID following-- continue oral vancomycin for now  Diarrhea possibly from tube feedings  Start lactobacillus 2 tabs bid  GI PCR was positive for Salmonella and antibiotics adjusted per ID    Complicated UTI (urinary tract infection)  History of chronic Mathur catheter  Catheter changed on 04/25/24   Urine culture collected 4/23 (+) Klebsiella pneumoniae (/MDRO) and Providencia stuartii  Urine culture collected 4/29 negative so far  Continue ceftazidime-avibactam (Started 4/29)  UA collected 4/29 improved when compared to UA collected 4/25: Bact- few (4/29),Many (4/25)/ Color- yellow (was red 4/25)/ Blood- 1+ (was 3+)/ still with pyuria and 3+ Leuk Est/ Remains Nitrite neg  Repeat urine negative  Completed Avycaz    Acute blood loss anemia  Patient's anemia is currently controlled. Has received 2 units of PRBCs on 4/24 . Etiology likely d/t acute blood loss which was from GIB  Current CBC reviewed-   Lab Results   Component Value Date    HGB 7.9 (L) 05/06/2024    HCT 24.4 (L) 05/06/2024     Monitor serial CBC and transfuse if patient becomes hemodynamically unstable, symptomatic or H/H drops below 7/21.    Hyperkalemia  Patient has hypokalemia which  is Acute and currently controlled. Most recent potassium levels reviewed-   Lab Results   Component Value Date    K 4.8 05/03/2024   . Will continue potassium replacement per protocol and recheck repeat levels after replacement completed.   -----------------------------------------------------------------------  Potassium currently normal        GI hemorrhage  GI consulted: S/P EGD: moderate/severe distal esophagitis, no bleeding.  Aspirin Eliquis has been resumed  Continue IV Protonix b.i.d.    Seizure disorder  Controlled.  Continue chronic antiepileptic.  Seizure precautions       Sacral decubitus ulcer, stage III  Chronic issue  Wound Care following  Continue current wound care regimen   Continue pressure relief measures      Thrombocytosis  Acute on chronic, worsening:  - hematology consulted: appreciate recommendations  - thought to be reactive from infection however thrombocytosis worsens while WBC, procal, and inflammatory markers improve       Aphasia  Due to history of CVA  CXR with concern for worsening infiltrates; concern for aspiration; scopolamine patch ordered for decreased secretions; discussed case with POA and palliative care consulted        VTE Risk Mitigation (From admission, onward)           Ordered     apixaban tablet 2.5 mg  2 times daily         05/01/24 1524     IP VTE HIGH RISK PATIENT  Once         04/25/24 1903     Place sequential compression device  Until discontinued         04/25/24 1903                    Discharge Planning   ALEX: 5/9/2024     Code Status: DNR   Is the patient medically ready for discharge?:     Reason for patient still in hospital (select all that apply): Patient trending condition, Laboratory test, Treatment, Consult recommendations, and Pending disposition  Discharge Plan A: Return to nursing home                  Lizett Scales PA-C  Department of Hospital Medicine   [facility]

## 2024-05-07 NOTE — PROGRESS NOTES
"[facility]  Adult Nutrition   Progress Note (Nutrition Support Management)    SUMMARY     Recommendations  Recommendation/Intervention: 1. Continue Glucerna 1.5 @ 40 mL/hr goal rate. Provides 1440 kcal, 79 gm protein, and 729 mL free water. Continue FWF to 150 mL every 4 hours (900 mL) to meet fluid needs (1629 mL)  Goals: 1. Patient will continue to tolerate enteral feeding by follow up. 2. Labs trend to target range by follow up.  Nutrition Goal Status: progressing towards goal  Communication of RD Recs: reviewed with physician    Nutrition Diagnosis PES Statement: Increased nutrition needs related to wound healing as evidenced by stage 3 sacral wound, lower leg stage 2 wound on enteral feeding. Continues    Dietitian Rounds Brief  Hospice discussion with Palliative care today. TF infusing at goal during visit. Tolerates. RD to follow for status change PRN.    Nutrition Related Social Determinants of Health: SDOH: Adequate food in home environment      Diet order:   Current Diet Order: NPO      Current Nutrition Support Formula Ordered: Glucerna 1.5  Current Nutrition Support Rate Ordered: 40 (ml)  Current Nutrition Support Frequency Ordered: continuous    Evaluation of Received Nutrient/Fluid Intake  Enteral Calories (kcal): 1440  Enteral Protein (gm): 79  Enteral (Free Water) Fluid (mL): 729  Free Water Flush Fluid (mL): 1200  Energy Calories Required: meeting needs  Protein Required: meeting needs  Fluid Required: meeting needs  Tolerance: tolerating     % Intake of Estimated Energy Needs: 75 - 100 %  % Meal Intake: NPO      Intake/Output Summary (Last 24 hours) at 5/7/2024 1633  Last data filed at 5/7/2024 1624  Gross per 24 hour   Intake 1000 ml   Output 2400 ml   Net -1400 ml        Anthropometrics  Temp: 98.3 °F (36.8 °C)  Height Method: Stated  Height: 4' 11" (149.9 cm)  Height (inches): 59 in  Weight Method: Bed Scale  Weight: 53.5 kg (118 lb)  Weight (lb): 118 lb  Ideal Body Weight (IBW), " Female: 95 lb  % Ideal Body Weight, Female (lb): 124.21 %  BMI (Calculated): 23.8  BMI Grade: 18.5-24.9 - normal       Estimated/Assessed Needs  Weight Used For Calorie Calculations: 53.5 kg (117 lb 15.1 oz)  Energy Calorie Requirements (kcal): 0577-4221 / day (25-30 kcal/kg  Energy Need Method: Kcal/kg  Protein Requirements: 64-80 gm/day (1.2-1.5 gm/kg)  Weight Used For Protein Calculations: 53.5 kg (117 lb 15.1 oz)     Estimated Fluid Requirement Method: RDA Method  RDA Method (mL): 1337  CHO Requirement: 167-200 gm/day    Reason for Assessment  Reason For Assessment: RD follow-up  Diagnosis: gastrointestinal disease  Relevant Medical History: CVA, PEG feedings  Interdisciplinary Rounds: did not attend  General Information Comments: CVA with known baseline confusion and aphasia who was sent to the ER because of black drainage coming from around the PEG tube site and having black vomit.  Nutrition Discharge Planning: Pending    Nutrition/Diet History  Spiritual, Cultural Beliefs, Jainism Practices, Values that Affect Care: no  Food Allergies: NKFA  Factors Affecting Nutritional Intake: NPO  Nutrition Support Formula Prior to Admit: Glucerna 1.5  Nutrition Support Rate Prior to Admit:  (unsure)    Nutrition Risk Screen  Nutrition Risk Screen: tube feeding or parenteral nutrition       Wound 04/26/24 1100 Right Foot-Wound Image: Images linked       Wound 04/26/24 1100 Left Calf-Wound Image: Images linked  MST Score: 2  Have you recently lost weight without trying?: Unsure  Weight loss score: 2  Have you been eating poorly because of a decreased appetite?: No  Appetite score: 0       Weight History:  Wt Readings from Last 10 Encounters:   04/26/24 53.5 kg (118 lb)   05/01/24 53.5 kg (117 lb 15.1 oz)   02/23/24 54.2 kg (119 lb 7.8 oz)   02/14/24 61.7 kg (136 lb)   01/09/24 61.7 kg (136 lb)   12/06/23 61.8 kg (136 lb 4.3 oz)   12/12/23 61.7 kg (136 lb)   12/11/23 61.8 kg (136 lb 3.9 oz)   11/21/23 56.2 kg (124 lb)    10/24/23 56.5 kg (124 lb 9 oz)        Lab/Procedures/Meds: Pertinent Labs/Meds Reviewed    Medications:Pertinent Medications Reviewed  Scheduled Meds:   albuterol-ipratropium  3 mL Nebulization Q6H WAKE    allopurinoL  100 mg Per G Tube Daily    amLODIPine  10 mg Per G Tube Daily    apixaban  2.5 mg Per G Tube BID    aspirin  81 mg Per G Tube Daily    atorvastatin  80 mg Per G Tube QHS    folic acid  1,000 mcg Per G Tube QAM    insulin detemir U-100  18 Units Subcutaneous QHS    latanoprost  1 drop Both Eyes QHS    levETIRAcetam (Keppra) IV (PEDS and ADULTS)  500 mg Intravenous Q12H    levoFLOXacin  750 mg Intravenous Q24H    metoprolol tartrate  25 mg Per G Tube BID    pantoprazole  40 mg Intravenous BID    scopolamine  1 patch Transdermal Q3 Days    thiamine  100 mg Per G Tube Daily    vancomycin  125 mg Per G Tube BID    vancomycin (VANCOCIN) IV (PEDS and ADULTS)  1,000 mg Intravenous Q24H     Continuous Infusions:  PRN Meds:.  Current Facility-Administered Medications:     0.9%  NaCl infusion (for blood administration), , Intravenous, Q24H PRN    acetaminophen, 650 mg, Oral, Q4H PRN    aluminum-magnesium hydroxide-simethicone, 30 mL, Oral, QID PRN    [COMPLETED] calcium gluconate IVPB, 1 g, Intravenous, ED 1 Time **AND** calcium gluconate IVPB, 1 g, Intravenous, Q10 Min PRN    cloNIDine, 0.1 mg, Oral, Q8H PRN    dextrose 50%, 12.5 g, Intravenous, PRN    dextrose 50%, 25 g, Intravenous, PRN    glucagon (human recombinant), 1 mg, Intramuscular, PRN    glucose, 16 g, Oral, PRN    glucose, 24 g, Oral, PRN    hydrALAZINE, 10 mg, Intravenous, Q6H PRN    HYDROcodone-acetaminophen, 1 tablet, Oral, Q6H PRN    insulin aspart U-100, 0-10 Units, Subcutaneous, Q6H PRN    magnesium oxide, 800 mg, Oral, PRN    magnesium oxide, 800 mg, Oral, PRN    melatonin, 6 mg, Oral, Nightly PRN    naloxone, 0.02 mg, Intravenous, PRN    ondansetron, 4 mg, Intravenous, Q6H PRN    potassium bicarbonate, 35 mEq, Oral, PRN    potassium  "bicarbonate, 50 mEq, Oral, PRN    potassium bicarbonate, 60 mEq, Oral, PRN    potassium, sodium phosphates, 2 packet, Oral, PRN    potassium, sodium phosphates, 2 packet, Oral, PRN    potassium, sodium phosphates, 2 packet, Oral, PRN    sodium chloride 0.9%, 10 mL, Intravenous, Q12H PRN    Pharmacy to dose Vancomycin consult, , , Once **AND** vancomycin - pharmacy to dose, , Intravenous, pharmacy to manage frequency    Labs: Pertinent Labs Reviewed  Clinical Chemistry:  Recent Labs   Lab 05/05/24  0637 05/06/24  0609 05/07/24  0607    136 135*   K 4.5 4.4 4.5    105 106   CO2 24 23 20*   * 195* 235*   BUN 26* 24* 24*   CREATININE 0.9 0.9 0.8   CALCIUM 8.6* 8.5* 8.5*   PROT 6.6 6.3 6.4   ALBUMIN 2.7* 2.6* 2.8*   BILITOT 0.3 0.2 0.2   ALKPHOS 134 126 129   AST 20 21 23   ALT 25 25 23   ANIONGAP 8 8 9   MG 2.0 1.9 1.7     CBC:   Recent Labs   Lab 05/07/24  0607   WBC 17.97*   RBC 2.96*   HGB 8.3*   HCT 26.8*   *   MCV 91   MCH 28.0   MCHC 31.0*     Lipid Panel:  No results for input(s): "CHOL", "HDL", "LDLCALC", "TRIG", "CHOLHDL" in the last 168 hours.  Cardiac Profile:  Recent Labs   Lab 05/02/24  0400   BNP 82     Inflammatory Labs:  Recent Labs   Lab 05/03/24  0506 05/05/24  0637 05/07/24  0607   CRP 8.00* 6.80* 2.90*     Diabetes:  No results for input(s): "HGBA1C", "POCTGLUCOSE" in the last 168 hours.  Thyroid & Parathyroid:  No results for input(s): "TSH", "FREET4", "I2AWPAL", "V6ZPHZG", "THYROIDAB" in the last 168 hours.    Monitor and Evaluation  Food and Nutrient Intake: energy intake, food and beverage intake  Food and Nutrient Adminstration: diet order  Knowledge/Beliefs/Attitudes: food and nutrition knowledge/skill  Physical Activity and Function: nutrition-related ADLs and IADLs  Anthropometric Measurements: weight change, weight, body mass index  Biochemical Data, Medical Tests and Procedures: gastrointestinal profile, electrolyte and renal panel, glucose/endocrine profile, " inflammatory profile, lipid profile  Nutrition-Focused Physical Findings: overall appearance     Nutrition Risk  Level of Risk/Frequency of Follow-up:  (2 times / week)     Nutrition Follow-Up  RD Follow-up?: Yes      Jaelyn Mireles RD 05/07/2024 4:33 PM

## 2024-05-07 NOTE — PROGRESS NOTES
"0900- Notified by BUBBA BRYANT Hina Hadley to be here 10:30/1100 today.     1100- No family currently at the bedside.     1300- Called Daughter, Leonie; unable to come to the hospital until 5:30pm. Requested phone consultation. Ms. Hadley states "Call back in about 30 minutes."     1330- Advance Care Planning     Date: 05/07/2024    [facility]  I engaged the healthcare power of   in a voluntary conversation about advance care planning and we specifically addressed what the goals of care would be moving forward, in light of the patient's change in clinical status.  We did specifically address the patient's likely prognosis, which is poor.  We explored the patient's values and preferences for future care.  The healthcare power of   endorses that what is most important right now is to focus on improvement in condition but with limits to invasive therapies    Accordingly, we have decided that the best plan to meet the patient's goals includes continuing with treatment    I did explain the role for hospice care at this stage of the patient's illness, including its ability to help the patient live with the best quality of life possible. Hina Hadley is agreeable to a hospice informational visit with Agency contracted with [facility]. CM and medical team updated.     Daughter expressed concerns about trying to transfer pt to another nursing home. She would prefer to not place her Mother on Hospice until this happens. She reports she started this process with the MARYCARMEN/MANNY at [place] 2 months ago. Notified Daughter that if not returning to nursing home on hospice then she would return there once medically cleared and finish IV abx on May 14th. Daughter verbalized understanding.     I spent a total of 35 minutes engaging the patient in this advance care planning discussion.     CM and medical team updated.     Advance Care Planning     Date: 05/07/2024    Code Status  In light of the patients advanced and life limiting " illness,I engaged the the healthcare power of   in a voluntary conversation about the patient's preferences for care  at the very end of life. The patient wishes to have a natural, peaceful death.  Along those lines, the patient does not wish to have CPR or other invasive treatments performed when her heart and/or breathing stops. I communicated to the healthcare power of   that a DNR order would be placed in her medical record to reflect this preference.  I spent a total of 10 minutes engaging the patient in this advance care planning discussion.

## 2024-05-07 NOTE — ASSESSMENT & PLAN NOTE
"This patient does have evidence of infective focus  My overall impression is  severe sepsis :  Evidenced by leukocytosis, fever, current infection, and lactic acidosis.  Source: Urinary Tract  Antibiotics given-   Antibiotics (72h ago, onward)      Start     Stop Route Frequency Ordered    05/07/24 0900  vancomycin 125 mg/5 mL oral solution 125 mg         -- PER G TUBE 2 times daily 05/07/24 0836    05/05/24 1400  vancomycin in dextrose 5 % 1 gram/250 mL IVPB 1,000 mg        Note to Pharmacy: Ht: 4' 11" (1.499 m)  Wt: 53.5 kg (118 lb)  Estimated Creatinine Clearance: 47.4 mL/min (based on SCr of 0.9 mg/dL).  Body mass index is 23.83 kg/m².    -- IV Every 24 hours (non-standard times) 05/05/24 0830    05/02/24 1400  levoFLOXacin 750 mg/150 mL IVPB 750 mg         -- IV Every 24 hours (non-standard times) 05/02/24 1250    04/30/24 1653  vancomycin - pharmacy to dose  (vancomycin IVPB (PEDS and ADULTS))        Placed in "And" Linked Group    -- IV pharmacy to manage frequency 04/30/24 1554    04/29/24 0800  cefTAZidime-avibactam (AVYCAZ) 1.25 g in dextrose 5 % (D5W) 100 mL         05/06/24 0959 IV Every 8 hours (non-standard times) 04/29/24 0046          Latest lactate reviewed-  No results for input(s): "LACTATE", "POCLAC" in the last 72 hours.    Organ dysfunction indicated by Acute kidney injury-- which has resolved    Fluid challenge Not needed - patient is not hypotensive      Post- resuscitation assessment No - Post resuscitation assessment not needed       Will Not start Pressors- Levophed for MAP of 65  Source control achieved by:  IV antibiotics  ---------------------------------------------------------------  It is noted that patient has history of recurrent episodes of sepsis due to complicated UTI.  Awaiting urine culture results  Procalcitonin 8.983.  Not sure if this level is improved since an initial level was not obtained when patient was admitted.  Highest Procalcitonin was 133 with patient's last " episode of sepsis 12/2/23.  Lactic acidosis resolved  Urine culture positive Klebsiella (/MDRO) and Providencia stuartii/ repeat urine culture negative so far  Blood cultures collected 4/25 (+) Staphylococcus haemolyticus/ repeat blood cultures collected 4/29 negative so far  Stool negative for acute C diff infection  Continue IV vancomycin and ceftazidime-avibactam   Continue IV fluid hydration  Fevers are slowly improving. Had temp 202.2 4/28 @ 2245.  First dose of ceftazidime-avibactam given 4/29 @ 0035.  T-max since starting new antibiotic was 100.9 @ 4/29 0420.  Last antipyretic given 4/29 @2130.  GI PCR + for salmonella and abx adjusted to vanc, avycaz, and Levaquin 5/2.   5/3: WBC increasing on am labs; CXR with worsening infiltrates; discussed case with the patient's POA at length; will consult palliative Care; at this time POA wishes for patient's remained full code  5/4: leukocytosis; ID following; IV abx; discussed case with pt's brother in person and POA via phone.   5/5: leukocytosis; ID following; on IV vanc, Avycaz, and Levaquin; palliative care consult; updated patient's POA on phone.   5/6: leukocytosis; ID following; Avycaz completed; IV vanc and Levaquin; awaiting palliative care and heme consult; called POA no answer and left VM.  5/7: ID final recs:  Vancomycin one gram IV every 24 hours through 5/14. Levofloxacin 750 mg IV daily through 5/8. Vancomycin oral 125 mg per PEG tube twice daily through May 14th

## 2024-05-07 NOTE — PROGRESS NOTES
Pharmacokinetic Assessment Follow Up: IV Vancomycin    Vancomycin serum concentration assessment(s):    The trough level was drawn correctly and can be used to guide therapy at this time. The measurement is within the desired definitive target range of 15 to 20 mcg/mL.    Vancomycin Regimen Plan:    Continue regimen to Vancomycin 1000 mg IV every 24  hours with next serum trough concentration measured at 1300 prior to 3rd dose on 5/9    Drug levels (last 3 results):  Recent Labs   Lab Result Units 05/05/24  0637 05/07/24  1235   Vancomycin-Trough ug/mL 22.1* 17.5       Pharmacy will continue to follow and monitor vancomycin.    Please contact pharmacy at extension [phone] for questions regarding this assessment.    Thank you for the consult,   Varun Quintero       Patient brief summary:  Steff Johnson is a 64 y.o. female initiated on antimicrobial therapy with IV Vancomycin for treatment of bacteremia    The patient's current regimen is vancomycin 1000 mg Q24H    Drug Allergies:   Review of patient's allergies indicates:  No Known Allergies    Actual Body Weight:   53.5 kg     Renal Function:   Estimated Creatinine Clearance: 53.3 mL/min (based on SCr of 0.8 mg/dL).,     Dialysis Method (if applicable):  N/A    CBC (last 72 hours):  Recent Labs   Lab Result Units 05/05/24  0637 05/06/24  0609 05/07/24  0607   WBC K/uL 20.71* 20.40* 17.97*   Hemoglobin g/dL 8.1* 7.9* 8.3*   Hematocrit % 25.4* 24.4* 26.8*   Platelets K/uL 739* 772* 846*   Gran % % 82.1* 88.0* 78.2*   Lymph % % 6.2* 4.0* 8.5*   Mono % % 5.8 4.0 8.5   Eosinophil % % 1.3 0.0 1.4   Basophil % % 0.3 0.0 0.8   Differential Method  Automated Manual Automated       Metabolic Panel (last 72 hours):  Recent Labs   Lab Result Units 05/05/24  0637 05/06/24  0609 05/07/24  0607   Sodium mmol/L 136 136 135*   Potassium mmol/L 4.5 4.4 4.5   Chloride mmol/L 104 105 106   CO2 mmol/L 24 23 20*   Glucose mg/dL 198* 195* 235*   BUN mg/dL 26* 24* 24*   Creatinine mg/dL 0.9  0.9 0.8   Albumin g/dL 2.7* 2.6* 2.8*   Total Bilirubin mg/dL 0.3 0.2 0.2   Alkaline Phosphatase U/L 134 126 129   AST U/L 20 21 23   ALT U/L 25 25 23   Magnesium mg/dL 2.0 1.9 1.7       Vancomycin Administrations:  vancomycin given in the last 96 hours                     vancomycin in dextrose 5 % 1 gram/250 mL IVPB 1,000 mg (mg) 1,000 mg New Bag 05/06/24 1508     1,000 mg New Bag 05/05/24 1448    vancomycin in dextrose 5 % 1 gram/250 mL IVPB 1,000 mg (mg) 1,000 mg New Bag 05/04/24 1332     1,000 mg New Bag 05/03/24 1924                    Microbiologic Results:  Microbiology Results (last 7 days)       Procedure Component Value Units Date/Time    Blood culture [[id number]] Collected: 05/02/24 0016    Order Status: Completed Specimen: Blood from Peripheral, Hand, Left Updated: 05/07/24 0232     Blood Culture, Routine No growth after 5 days.    Narrative:      x2    Blood culture [[id number]] Collected: 04/30/24 1729    Order Status: Completed Specimen: Blood from Peripheral, Hand, Right Updated: 05/05/24 1832     Blood Culture, Routine No growth after 5 days.    Blood culture [[id number]] Collected: 04/30/24 1729    Order Status: Completed Specimen: Blood from Peripheral, Hand, Left Updated: 05/05/24 1832     Blood Culture, Routine No growth after 5 days.    Culture, Respiratory with Gram Stain [[id number]] Collected: 05/02/24 1240    Order Status: Completed Specimen: Endotracheal Updated: 05/05/24 0949     Respiratory Culture Reduced normal respiratory zach     Gram Stain (Respiratory) >10 epithelial cells per low power field     Gram Stain (Respiratory) No WBC's     Gram Stain (Respiratory) Rare Gram positive cocci    Blood culture [[id number]] Collected: 04/29/24 0628    Order Status: Completed Specimen: Blood from Peripheral, Hand, Right Updated: 05/04/24 0832     Blood Culture, Routine No growth after 5 days.    Culture, Respiratory with Gram Stain [[id number]] Collected: 05/02/24 1240    Order Status:  Completed Specimen: Sputum from Endotracheal Aspirate Updated: 05/02/24 1643     Respiratory Culture Specimen inadequate - culture not performed     Gram Stain (Respiratory) >10 epithelial cells per low power field     Gram Stain (Respiratory) No WBC's     Gram Stain (Respiratory) Rare Gram positive cocci     Gram Stain (Respiratory) Predominance of oropharyngeal zach. Please recollect.    Narrative:      Please have respiratory NT suction for sputum culture    Urine culture [[id number]] Collected: 04/29/24 1058    Order Status: Completed Specimen: Urine Updated: 05/02/24 0721     Urine Culture, Routine No growth    Narrative:      Specimen Source->Urine    MRSA Screen by PCR [[id number]] Collected: 04/30/24 1653    Order Status: Completed Specimen: Nasal Swab Updated: 04/30/24 1959     MRSA SCREEN BY PCR Negative    Respiratory Infection Panel (PCR), Nasopharyngeal [[id number]] Collected: 04/30/24 1728    Order Status: Completed Specimen: Nasopharyngeal Swab Updated: 04/30/24 1844     Respiratory Infection Panel Source NP swab     Adenovirus Not Detected     Coronavirus 229E, Common Cold Virus Not Detected     Coronavirus HKU1, Common Cold Virus Not Detected     Coronavirus NL63, Common Cold Virus Not Detected     Coronavirus OC43, Common Cold Virus Not Detected     Comment: Coronavirus strains 229E, HKU1, NL63, and OC43 can cause the common   cold   and are not associated with the respiratory disease outbreak caused   by  the COVID-19 (SARS-CoV-2 novel Coronavirus) strain.           SARS-CoV2 (COVID-19) Qualitative PCR Not Detected     Human Metapneumovirus Not Detected     Human Rhinovirus/Enterovirus Not Detected     Influenza A (subtypes H1, H1-2009,H3) Not Detected     Influenza B Not Detected     Parainfluenza Virus 1 Not Detected     Parainfluenza Virus 2 Not Detected     Parainfluenza Virus 3 Not Detected     Parainfluenza Virus 4 Not Detected     Respiratory Syncytial Virus Not Detected     Bordetella  Parapertussis (MP1868) Not Detected     Bordetella pertussis (ptxP) Not Detected     Chlamydia pneumoniae Not Detected     Mycoplasma pneumoniae Not Detected     Comment: Respiratory Infection Panel testing performed by Multiplex PCR.       Narrative:      Respiratory Infection Panel source->NP Swab

## 2024-05-07 NOTE — ASSESSMENT & PLAN NOTE
Blood cultures collected 4/25 (+) Staphylococcus haemolyticus  Repeat blood cultures collected 4/30 and 5/2 negative so far   Continue IV vancomycin  ID final recs: Vancomycin one gram IV every 24 hours through 5/14  Levofloxacin 750 mg IV daily through 5/8   Vancomycin oral 125 mg per PEG tube twice daily through May 14th

## 2024-05-07 NOTE — CARE UPDATE
05/07/24 0736   Patient Assessment/Suction   Level of Consciousness (AVPU) alert   Respiratory Effort Normal;Unlabored   Expansion/Accessory Muscles/Retractions expansion symmetric;no retractions;no use of accessory muscles   All Lung Fields Breath Sounds Anterior:;Lateral:;diminished;coarse   Rhythm/Pattern, Respiratory pattern regular;unlabored   PRE-TX-O2   Device (Oxygen Therapy) room air   SpO2 96 %   Pulse Oximetry Type Intermittent   $ Pulse Oximetry - Multiple Charge Pulse Oximetry - Multiple   Pulse 85   Resp 18   Aerosol Therapy   $ Aerosol Therapy Charges Aerosol Treatment   Daily Review of Necessity (SVN) completed   Respiratory Treatment Status (SVN) given   Treatment Route (SVN) mask;oxygen   Patient Position HOB elevated   Post Treatment Assessment (SVN) breath sounds improved   Signs of Intolerance (SVN) none   Education   $ Education Bronchodilator;15 min

## 2024-05-07 NOTE — CARE UPDATE
05/06/24 1931   Patient Assessment/Suction   Level of Consciousness (AVPU) alert   Respiratory Effort Normal;Unlabored   Expansion/Accessory Muscles/Retractions no use of accessory muscles;no retractions   JONATHON Breath Sounds clear   LLL Breath Sounds clear   RUL Breath Sounds clear   RML Breath Sounds clear   RLL Breath Sounds clear   Rhythm/Pattern, Respiratory unlabored;pattern regular;depth regular   Cough Frequency no cough   PRE-TX-O2   Device (Oxygen Therapy) room air   SpO2 (!) 94 %   Pulse Oximetry Type Intermittent   $ Pulse Oximetry - Single Charge Pulse Oximetry - Single   Aerosol Therapy   $ Aerosol Therapy Charges Aerosol Treatment   Daily Review of Necessity (SVN) completed   Respiratory Treatment Status (SVN) given   Treatment Route (SVN) oxygen;mask   Patient Position HOB elevated   Post Treatment Assessment (SVN) increased aeration   Signs of Intolerance (SVN) none   Education   $ Education Bronchodilator;15 min

## 2024-05-07 NOTE — PLAN OF CARE
[facility]  Department of Infectious Disease  Plan of Care        PATIENT NAME: Steff Johnson  MRN: [id number]  TODAY'S DATE: 05/07/2024  ADMIT DATE: 4/25/2024  LENGTH OF STAY: 12 DAYS    PRINCIPLE PROBLEM: Severe sepsis    REASON FOR CONSULT:  UA and C diff, Staphylococcus haemolyticus bacteremia, salmonella      INFECTIOUS DISEASE DISCHARGE RECOMMENDATIONS:  When patient is ready to be discharged by attending, Infectious Disease recommends the following    Vancomycin one gram IV every 24 hours through 5/14  Levofloxacin 750 mg IV daily through 5/8   Vancomycin oral 125 mg per PEG tube twice daily through May 14th  Weekly labs on Monday including CBC with diff, CMP, and CRP for duration of antibiotic therapy   Vancomycin trough level twice weekly on Mondays and Thursdays  Order for midline placed on 05/07   Maintain midline per facility protocol with weekly dressing changes and routine flushes  Remove PICC line at completion of therapy  Please fax lab work to [phone], attention Dr Rae Cochran      Communication sent to Case Management    D/W Dr Cochran      Infectious Disease will sign off. Please send Epic secure chat with any questions.    Roberta Samaniego NP  Date of Service: 05/07/2024  12:17 PM

## 2024-05-07 NOTE — PLAN OF CARE
Per PC team, daughter is open to informational with hospice contracted with [facility]. SW sent referral to [facility]. SW sent message to Roberta ([facility]) to review.

## 2024-05-08 VITALS
WEIGHT: 118 LBS | HEIGHT: 59 IN | RESPIRATION RATE: 16 BRPM | HEART RATE: 77 BPM | TEMPERATURE: 98 F | DIASTOLIC BLOOD PRESSURE: 71 MMHG | BODY MASS INDEX: 23.79 KG/M2 | SYSTOLIC BLOOD PRESSURE: 156 MMHG | OXYGEN SATURATION: 98 %

## 2024-05-08 LAB
ALBUMIN SERPL BCP-MCNC: 2.7 G/DL (ref 3.5–5.2)
ALP SERPL-CCNC: 128 U/L (ref 55–135)
ALT SERPL W/O P-5'-P-CCNC: 22 U/L (ref 10–44)
ANION GAP SERPL CALC-SCNC: 6 MMOL/L (ref 8–16)
AST SERPL-CCNC: 19 U/L (ref 10–40)
BASOPHILS # BLD AUTO: 0.1 K/UL (ref 0–0.2)
BASOPHILS NFR BLD: 0.6 % (ref 0–1.9)
BILIRUB SERPL-MCNC: 0.2 MG/DL (ref 0.1–1)
BUN SERPL-MCNC: 24 MG/DL (ref 8–23)
CALCIUM SERPL-MCNC: 8.7 MG/DL (ref 8.7–10.5)
CHLORIDE SERPL-SCNC: 107 MMOL/L (ref 95–110)
CO2 SERPL-SCNC: 23 MMOL/L (ref 23–29)
CREAT SERPL-MCNC: 0.8 MG/DL (ref 0.5–1.4)
DIFFERENTIAL METHOD BLD: ABNORMAL
EOSINOPHIL # BLD AUTO: 0.3 K/UL (ref 0–0.5)
EOSINOPHIL NFR BLD: 2 % (ref 0–8)
ERYTHROCYTE [DISTWIDTH] IN BLOOD BY AUTOMATED COUNT: 18.7 % (ref 11.5–14.5)
EST. GFR  (NO RACE VARIABLE): >60 ML/MIN/1.73 M^2
GLUCOSE SERPL-MCNC: 171 MG/DL (ref 70–110)
GLUCOSE SERPL-MCNC: 181 MG/DL (ref 70–110)
GLUCOSE SERPL-MCNC: 196 MG/DL (ref 70–110)
HCT VFR BLD AUTO: 25.3 % (ref 37–48.5)
HGB BLD-MCNC: 8.3 G/DL (ref 12–16)
IMM GRANULOCYTES # BLD AUTO: 0.5 K/UL (ref 0–0.04)
IMM GRANULOCYTES NFR BLD AUTO: 3 % (ref 0–0.5)
LYMPHOCYTES # BLD AUTO: 1.9 K/UL (ref 1–4.8)
LYMPHOCYTES NFR BLD: 11.1 % (ref 18–48)
MAGNESIUM SERPL-MCNC: 1.8 MG/DL (ref 1.6–2.6)
MCH RBC QN AUTO: 27.6 PG (ref 27–31)
MCHC RBC AUTO-ENTMCNC: 32.8 G/DL (ref 32–36)
MCV RBC AUTO: 84 FL (ref 82–98)
MONOCYTES # BLD AUTO: 1.6 K/UL (ref 0.3–1)
MONOCYTES NFR BLD: 9.8 % (ref 4–15)
NEUTROPHILS # BLD AUTO: 12.3 K/UL (ref 1.8–7.7)
NEUTROPHILS NFR BLD: 73.5 % (ref 38–73)
NRBC BLD-RTO: 0 /100 WBC
PLATELET # BLD AUTO: 864 K/UL (ref 150–450)
PMV BLD AUTO: 10.1 FL (ref 9.2–12.9)
POTASSIUM SERPL-SCNC: 4.5 MMOL/L (ref 3.5–5.1)
PROT SERPL-MCNC: 6.4 G/DL (ref 6–8.4)
RBC # BLD AUTO: 3.01 M/UL (ref 4–5.4)
SODIUM SERPL-SCNC: 136 MMOL/L (ref 136–145)
WBC # BLD AUTO: 16.73 K/UL (ref 3.9–12.7)

## 2024-05-08 PROCEDURE — 25000003 PHARM REV CODE 250: Performed by: HOSPITALIST

## 2024-05-08 PROCEDURE — 99900035 HC TECH TIME PER 15 MIN (STAT)

## 2024-05-08 PROCEDURE — 63600175 PHARM REV CODE 636 W HCPCS: Performed by: INTERNAL MEDICINE

## 2024-05-08 PROCEDURE — 99900031 HC PATIENT EDUCATION (STAT)

## 2024-05-08 PROCEDURE — 25000003 PHARM REV CODE 250: Performed by: NURSE PRACTITIONER

## 2024-05-08 PROCEDURE — 80053 COMPREHEN METABOLIC PANEL: CPT | Performed by: HOSPITALIST

## 2024-05-08 PROCEDURE — 85025 COMPLETE CBC W/AUTO DIFF WBC: CPT | Performed by: HOSPITALIST

## 2024-05-08 PROCEDURE — 25000003 PHARM REV CODE 250

## 2024-05-08 PROCEDURE — 94761 N-INVAS EAR/PLS OXIMETRY MLT: CPT

## 2024-05-08 PROCEDURE — 25000242 PHARM REV CODE 250 ALT 637 W/ HCPCS: Performed by: HOSPITALIST

## 2024-05-08 PROCEDURE — 83735 ASSAY OF MAGNESIUM: CPT | Performed by: HOSPITALIST

## 2024-05-08 PROCEDURE — C9113 INJ PANTOPRAZOLE SODIUM, VIA: HCPCS | Performed by: INTERNAL MEDICINE

## 2024-05-08 PROCEDURE — 63600175 PHARM REV CODE 636 W HCPCS: Performed by: HOSPITALIST

## 2024-05-08 PROCEDURE — 94640 AIRWAY INHALATION TREATMENT: CPT

## 2024-05-08 PROCEDURE — 36415 COLL VENOUS BLD VENIPUNCTURE: CPT | Performed by: HOSPITALIST

## 2024-05-08 PROCEDURE — 25000003 PHARM REV CODE 250: Performed by: INTERNAL MEDICINE

## 2024-05-08 PROCEDURE — 63600175 PHARM REV CODE 636 W HCPCS: Performed by: NURSE PRACTITIONER

## 2024-05-08 RX ORDER — VANCOMYCIN HCL IN 5 % DEXTROSE 1G/250ML
1000 PLASTIC BAG, INJECTION (ML) INTRAVENOUS DAILY
Qty: 250 ML | Refills: 5 | Status: SHIPPED | OUTPATIENT
Start: 2024-05-08 | End: 2024-05-14

## 2024-05-08 RX ORDER — L. ACIDOPHILUS/L.BULGARICUS 100MM CELL
1 GRANULES IN PACKET (EA) ORAL 2 TIMES DAILY
Qty: 12 PACKET | Refills: 0 | Status: SHIPPED | OUTPATIENT
Start: 2024-05-08 | End: 2024-05-14

## 2024-05-08 RX ADMIN — LEVOFLOXACIN 750 MG: 5 INJECTION, SOLUTION INTRAVENOUS at 01:05

## 2024-05-08 RX ADMIN — ASPIRIN 81 MG 81 MG: 81 TABLET ORAL at 09:05

## 2024-05-08 RX ADMIN — IPRATROPIUM BROMIDE AND ALBUTEROL SULFATE 3 ML: 2.5; .5 SOLUTION RESPIRATORY (INHALATION) at 01:05

## 2024-05-08 RX ADMIN — VANCOMYCIN HYDROCHLORIDE 1000 MG: 1 INJECTION, POWDER, LYOPHILIZED, FOR SOLUTION INTRAVENOUS at 01:05

## 2024-05-08 RX ADMIN — VANCOMYCIN HYDROCHLORIDE 125 MG: KIT at 09:05

## 2024-05-08 RX ADMIN — INSULIN ASPART 2 UNITS: 100 INJECTION, SOLUTION INTRAVENOUS; SUBCUTANEOUS at 07:05

## 2024-05-08 RX ADMIN — METOPROLOL TARTRATE 25 MG: 25 TABLET, FILM COATED ORAL at 09:05

## 2024-05-08 RX ADMIN — PANTOPRAZOLE SODIUM 40 MG: 40 INJECTION, POWDER, FOR SOLUTION INTRAVENOUS at 08:05

## 2024-05-08 RX ADMIN — LEVETIRACETAM 500 MG: 5 INJECTION INTRAVENOUS at 09:05

## 2024-05-08 RX ADMIN — THIAMINE HCL TAB 100 MG 100 MG: 100 TAB at 09:05

## 2024-05-08 RX ADMIN — ALLOPURINOL 100 MG: 100 TABLET ORAL at 09:05

## 2024-05-08 RX ADMIN — APIXABAN 2.5 MG: 2.5 TABLET, FILM COATED ORAL at 09:05

## 2024-05-08 RX ADMIN — AMLODIPINE BESYLATE 10 MG: 5 TABLET ORAL at 09:05

## 2024-05-08 RX ADMIN — FOLIC ACID 1000 MCG: 1 TABLET ORAL at 07:05

## 2024-05-08 RX ADMIN — IPRATROPIUM BROMIDE AND ALBUTEROL SULFATE 3 ML: 2.5; .5 SOLUTION RESPIRATORY (INHALATION) at 07:05

## 2024-05-08 RX ADMIN — INSULIN ASPART 2 UNITS: 100 INJECTION, SOLUTION INTRAVENOUS; SUBCUTANEOUS at 12:05

## 2024-05-08 NOTE — PLAN OF CARE
MANNY spoke with Pt's POA Leonie to discuss discharge plan. Pt's daughter vebalized at this time she does not want patient to discharge back to [facility] with hospice. Pt's daughter would still like informational. MANNY asked if Pt's family wants Pt to discharge home with hospice. Family unable to care fro Pt on hospice at home. MANNY informed Pt's POA, dc plan would be returning to [facility] on iv abx if family does not want hospice.       MANNY sent message to Lauren ([facility]) about Pt's return on iv abx. MANNY also informed Lauren of family wanting to switch facilities post discharge.    [facility] referrals sent as courtesy via [employer].       05/08/24 6027   Discharge Reassessment   Assessment Type Discharge Planning Reassessment   Did the patient's condition or plan change since previous assessment? No   Discharge Plan discussed with: Adult children   Communicated ALEX with patient/caregiver Date not available/Unable to determine   Discharge Plan A Return to nursing home   Discharge Plan B Return to Nursing Home   DME Needed Upon Discharge  none   Transition of Care Barriers None   Why the patient remains in the hospital Requires continued medical care   Post-Acute Status   Post-Acute Authorization Placement;Hospice   Hospice Status Referrals Sent   Coverage Payor: MEDICARE - MEDICARE PART A & B -   Discharge Delays None known at this time

## 2024-05-08 NOTE — PROGRESS NOTES
Brief Palliative Note:    I was not able to speak with her daughter today. I see where SW is setting up discharge back to [place] with IV antibiotics. This seems most aligned with wishes of family based on our teams previous discussion. I will reach out to daughter tomorrow; I worry current interventions are beginning to abut the line of non beneficial care and I want to ensure her daughter is fully aware.    I appreciate being involved. I we have been helpful.    Leandro Renee MD

## 2024-05-08 NOTE — RESPIRATORY THERAPY
05/07/24 2014   Patient Assessment/Suction   Level of Consciousness (AVPU) responds to voice   Respiratory Effort Normal;Unlabored   Expansion/Accessory Muscles/Retractions no use of accessory muscles;no retractions   All Lung Fields Breath Sounds Anterior:;coarse   LLL Breath Sounds diminished   RLL Breath Sounds diminished   Cough Frequency infrequent   Cough Type fair;loose   PRE-TX-O2   Device (Oxygen Therapy) room air   SpO2 100 %   Pulse Oximetry Type Intermittent   $ Pulse Oximetry - Multiple Charge Pulse Oximetry - Multiple   Pulse 93   Resp 19   Positioning HOB elevated 30 degrees   Aerosol Therapy   $ Aerosol Therapy Charges Aerosol Treatment   Daily Review of Necessity (SVN) completed   Respiratory Treatment Status (SVN) given   Treatment Route (SVN) mask;oxygen   Patient Position HOB elevated   Post Treatment Assessment (SVN) breath sounds unchanged   Signs of Intolerance (SVN) none   Education   $ Education Bronchodilator;15 min

## 2024-05-08 NOTE — PLAN OF CARE
MANNY spoke with Pt's POA Leonie regarding discharge. MANNY explained additional referral to transfer facilities have been sent. Facility is aware. [facility] following. MANNY spoke with Estephania ([facility]). Estephania to assist family with finding placement post discharge.       SAMMY Hernandez given information to call report [phone] room 105. ADT30 for ambulance ordered pickup time set for 5:20pm. Pt has no other needs to be addressed at this time. Clear to dc from CM standpoint.       05/08/24 1612   Final Note   Assessment Type Final Discharge Note   Anticipated Discharge Disposition [facility]   What phone number can be called within the next 1-3 days to see how you are doing after discharge? [phone]   Hospital Resources/Appts/Education Provided Provided patient/caregiver with written discharge plan information   Post-Acute Status   Post-Acute Authorization Placement;Hospice   Post-Acute Placement Status Set-up Complete/Auth obtained   Hospice Status Referrals Sent   Coverage Payor: MEDICARE - MEDICARE PART A & B -   Discharge Delays None known at this time

## 2024-05-08 NOTE — CARE UPDATE
05/08/24 0706   Patient Assessment/Suction   Level of Consciousness (AVPU) responds to voice   Respiratory Effort Unlabored   Expansion/Accessory Muscles/Retractions no use of accessory muscles   All Lung Fields Breath Sounds diminished;coarse   Rhythm/Pattern, Respiratory depth regular;pattern regular   Cough Frequency no cough   PRE-TX-O2   Device (Oxygen Therapy) room air   SpO2 95 %   Pulse Oximetry Type Intermittent   $ Pulse Oximetry - Multiple Charge Pulse Oximetry - Multiple   Pulse 78   Resp 15   Aerosol Therapy   $ Aerosol Therapy Charges Aerosol Treatment   Daily Review of Necessity (SVN) completed   Respiratory Treatment Status (SVN) given   Treatment Route (SVN) mask   Patient Position semi-Berman's   Post Treatment Assessment (SVN) increased aeration   Signs of Intolerance (SVN) none   Breath Sounds Post-Respiratory Treatment   Throughout All Fields Post-Treatment aeration increased   Post-treatment Heart Rate (beats/min) 77   Post-treatment Resp Rate (breaths/min) 15   Education   $ Education Bronchodilator;15 min   Respiratory Evaluation   $ Care Plan Tech Time 15 min

## 2024-05-08 NOTE — PLAN OF CARE
Problem: Infection  Goal: Absence of Infection Signs and Symptoms  5/8/2024 1624 by Mary Braun LPN  Outcome: Met  5/8/2024 1229 by Mary Braun LPN  Outcome: Progressing     Problem: Adult Inpatient Plan of Care  Goal: Plan of Care Review  5/8/2024 1624 by Mary Braun LPN  Outcome: Met  5/8/2024 1229 by Mary Braun LPN  Outcome: Progressing  Goal: Patient-Specific Goal (Individualized)  5/8/2024 1624 by Mary Braun LPN  Outcome: Met  5/8/2024 1229 by Mary Braun LPN  Outcome: Progressing  Goal: Absence of Hospital-Acquired Illness or Injury  5/8/2024 1624 by Mary Braun LPN  Outcome: Met  5/8/2024 1229 by Mary Braun LPN  Outcome: Progressing  Goal: Optimal Comfort and Wellbeing  5/8/2024 1624 by Mary Braun LPN  Outcome: Met  5/8/2024 1229 by Mary Braun LPN  Outcome: Progressing  Goal: Readiness for Transition of Care  5/8/2024 1624 by Mary Braun LPN  Outcome: Met  5/8/2024 1229 by Mary Braun LPN  Outcome: Progressing     Problem: Diabetes Comorbidity  Goal: Blood Glucose Level Within Targeted Range  5/8/2024 1624 by Mary Braun LPN  Outcome: Met  5/8/2024 1229 by Mary Braun LPN  Outcome: Progressing     Problem: Sepsis/Septic Shock  Goal: Optimal Coping  5/8/2024 1624 by Mary Braun LPN  Outcome: Met  5/8/2024 1229 by Mary Braun LPN  Outcome: Progressing  Goal: Absence of Bleeding  5/8/2024 1624 by Mary Braun LPN  Outcome: Met  5/8/2024 1229 by Mary Braun LPN  Outcome: Progressing  Goal: Blood Glucose Level Within Targeted Range  5/8/2024 1624 by Mary Braun LPN  Outcome: Met  5/8/2024 1229 by Mary Branu LPN  Outcome: Progressing  Goal: Absence of Infection Signs and Symptoms  5/8/2024 1624 by Mary Braun LPN  Outcome: Met  5/8/2024 1229 by Mary Braun LPN  Outcome: Progressing  Goal: Optimal Nutrition Intake  5/8/2024 1624 by Mary Braun LPN  Outcome: Met  5/8/2024 1229 by Mary Braun,  LPN  Outcome: Progressing     Problem: Acute Kidney Injury/Impairment  Goal: Fluid and Electrolyte Balance  5/8/2024 1624 by Mary Braun, LPN  Outcome: Met  5/8/2024 1229 by Mary Braun, LPN  Outcome: Progressing  Goal: Improved Oral Intake  5/8/2024 1624 by Mary Braun, LPN  Outcome: Met  5/8/2024 1229 by Mary Braun, LPN  Outcome: Progressing  Goal: Effective Renal Function  5/8/2024 1624 by Mary rBaun, LPN  Outcome: Met  5/8/2024 1229 by Mary Braun, LPN  Outcome: Progressing     Problem: Wound  Goal: Optimal Coping  5/8/2024 1624 by Mary Braun LPN  Outcome: Met  5/8/2024 1229 by Mary Braun, LPN  Outcome: Progressing  Goal: Optimal Functional Ability  5/8/2024 1624 by Mary Braun LPN  Outcome: Met  5/8/2024 1229 by Mary Braun, LPN  Outcome: Progressing  Goal: Absence of Infection Signs and Symptoms  5/8/2024 1624 by Mary Braun, LPN  Outcome: Met  5/8/2024 1229 by Mary Braun, LPN  Outcome: Progressing  Goal: Improved Oral Intake  5/8/2024 1624 by Mary Braun, LPN  Outcome: Met  5/8/2024 1229 by Mary Braun, LPN  Outcome: Progressing  Goal: Optimal Pain Control and Function  5/8/2024 1624 by Mary Braun LPN  Outcome: Met  5/8/2024 1229 by Mary Braun, LPN  Outcome: Progressing  Goal: Skin Health and Integrity  5/8/2024 1624 by Mary Braun, LPN  Outcome: Met  5/8/2024 1229 by Mary Braun, LPN  Outcome: Progressing  Goal: Optimal Wound Healing  5/8/2024 1624 by Mary Braun, LPN  Outcome: Met  5/8/2024 1229 by Mary Bruan LPN  Outcome: Progressing     Problem: Wound  Goal: Optimal Functional Ability  5/8/2024 1624 by Mary Braun LPN  Outcome: Met  5/8/2024 1229 by Mary Braun LPN  Outcome: Progressing     Problem: Wound  Goal: Absence of Infection Signs and Symptoms  5/8/2024 1624 by Mary Braun LPN  Outcome: Met  5/8/2024 1229 by Mary Braun LPN  Outcome: Progressing     Problem: Wound  Goal: Improved Oral  Intake  5/8/2024 1624 by Mary Braun LPN  Outcome: Met  5/8/2024 1229 by Mary Braun LPN  Outcome: Progressing     Problem: Wound  Goal: Optimal Pain Control and Function  5/8/2024 1624 by Mary Braun LPN  Outcome: Met  5/8/2024 1229 by Mary Braun LPN  Outcome: Progressing     Problem: Skin Injury Risk Increased  Goal: Skin Health and Integrity  5/8/2024 1624 by Mary Braun LPN  Outcome: Met  5/8/2024 1229 by Mary Braun LPN  Outcome: Progressing     Problem: Fall Injury Risk  Goal: Absence of Fall and Fall-Related Injury  5/8/2024 1624 by Mary Braun LPN  Outcome: Met  5/8/2024 1229 by Mary Braun LPN  Outcome: Progressing

## 2024-05-08 NOTE — DISCHARGE INSTRUCTIONS
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]  Unknown    Quadriplegia [G82.50]  Yes     Chronic    GI hemorrhage [K92.2]  Yes    Acute blood loss anemia [D62]  Yes    Complicated UTI (urinary tract infection) [N39.0]  Yes    Clostridium difficile infection [A49.8]  Yes    Hypertension [I10]  Yes    Type 2 diabetes mellitus with neurologic complication, with long-term current use of insulin [E11.49, Z79.4]  Not Applicable    History of ischemic stroke [Z86.73]  Not Applicable    Seizure disorder [G40.909]  Yes    Bacteremia [R78.81]  Yes    Sacral decubitus ulcer, stage III [L89.153]  Yes    Thrombocytosis [D75.839]  Yes    Aphasia [R47.01]  Yes      Resolved Hospital Problems    Diagnosis Date Resolved POA    Hematuria [R31.9] 04/30/2024 Yes     Allergies: Review of patient's allergies indicates:  No Known Allergies    Code Status: DNR    Vitals: Routine       Diet:   Patient location: [facility]   Select Formula: Glucerna 1.5   Formula Rate (mL/hr): 40   Feeding Volume (mL): 1000   Route: Gastrostomy   Container for Formula Delivery Ready to Hang Liter   Free water flush volume 150   Free water flush frequency Every 4 hours       Activity: Activity as tolerated    Nursing Precautions: Aspiration , Fall, Seizure, and Pressure ulcer prevention    Bed/Surface: Low Air Loss    Consults: PT to evaluate and treat- 5 times a week, OT to evaluate and treat- 5 times a week, Wound Care, and Nutrition to evaluate and recommend diet    Oxygen: room air    Dialysis: Patient is not on dialysis.     Labs:  CBC with diff., CMP, and CRP until 5/14 on Monday              -Vanc trough level twice weekly on Mondays and Thursdays              -Please fax lab work to [phone] , attention to Dr. Rae Cochran                Pending Diagnostic Studies:        Procedure Component Value Units Date/Time    EKG 12-lead [[id number]] Collected: 04/30/24 1438    Order Status: Sent Lab Status: In process Updated: 04/30/24 1553     QRS Duration 62 ms      [facility] QTC Calculation 442 ms     Narrative:      Test Reason : R07.9,    Vent. Rate : 079 BPM     Atrial Rate : 079 BPM     P-R Int : 140 ms          QRS Dur : 062 ms      QT Int : 386 ms       P-R-T Axes : 043 013 042 degrees     QTc Int : 442 ms    Normal sinus rhythm  Normal ECG  When compared with ECG of 25-APR-2024 12:25,  No significant change was found    Referred By: [email or web address]   SELF           Confirmed By:     Erythropoietin [[id number]] Collected: 05/07/24 0608    Order Status: Sent Lab Status: In process Updated: 05/07/24 0648    Specimen: Blood     Procalcitonin [[id number]] Collected: 04/25/24 1223    Order Status: Sent Lab Status: No result     Specimen: Blood     Vitamin B6 [[id number]] Collected: 05/07/24 0607    Order Status: Sent Lab Status: In process Updated: 05/07/24 0648    Specimen: Blood, Venous           Imaging: N/A    Miscellaneous Care:   PEG Care:  Clean site every 24 hours  Mathur Care: Empty Mathur bag every shift.  Change Mathur every month  Routine Skin for Bedridden Patients:  Apply moisture barrier cream to all  Wound Care:   Cleanse Sacrum and Left Calf  with NS and gauze.  Pat dry with gauze. Applied Medihoney and cover with Adhesive Bordered Foam Dressing.     Left medial leg wound pink wound bed, clean and dry apply Medihoney and mepilex, sacral stage 3 beefy red slight bleeding, scarred periwound.     Right buttock stage 2 1.1x1.3x0.1cm pink wound bed applied Medihoney covered with mepilex turned to the right side. Pillows between knees heels floated, left arm elevated.     Sacral stage 3 red beefy granulation tissue 2x1.6x0.4cm, apply Medihoney after cleaning and cover with gauze and Mepilex. right buttock sheering over periwound scarring, pink 1.1x1.3x0.1cm apply Triad to periwound     Left  medial lower leg stage 2 pink wound bed 2x1.4x0.3cm cleaned and dried and applied Medihoney covered with gauze and mepilex. Heel protectors applied.      Diabetes Care: Diabetes: Check blood sugar. Fingerstick blood sugar every 6 hours if unable to eat  Sliding Scale/Hypoglycemia Protocol: For FSG<80, give 1 amp D50 or 1 glucose tablet. For FSG , do nothing. For -200, give 1 unit of novolog in addition to pre-meal insulin. For -250, give 2 units of novolog in addition to pre-meal insulin. For -300, give 3 units of novolog in addition to pre-meal insulin. For 301-350, give 4 units of novolog in addition to pre-meal insulin. For 351-400, give 5 units of novolog in addition to pre-meal insulin. For FSG >400, give 5 units of novolog in addition to pre-meal insulin and please call MD    Midline Line Care:  Per facility protocol with weekly dressing changes in routine flushes    IV Access: Mid-line to be removed at completion of antibiotics     Medications: Discontinue all previous medication orders, if any. See new list below.  Current Discharge Medication List        START taking these medications    Details   vancomycin 125 mg/5 mL Soln 5 mLs (125 mg total) by Per G Tube route 2 (two) times a day. for 6 days      vancomycin HCl in 5 % dextrose (VANCOMYCIN IN DEXTROSE 5 %) 1 gram/250 mL Soln Inject 250 mLs (1,000 mg total) into the vein once daily. for 6 days  Qty: 250 mL, Refills: 5           CONTINUE these medications which have NOT CHANGED    Details   allopurinoL (ZYLOPRIM) 100 MG tablet 100 mg by Per G Tube route once daily.      amLODIPine (NORVASC) 10 MG tablet 1 tablet (10 mg total) by Per G Tube route once daily.    Comments: .      apixaban (ELIQUIS) 2.5 mg Tab 2.5 mg by Per G Tube route 2 (two) times daily.      ascorbic acid, vitamin C, (VITAMIN C) 500 MG tablet 500 mg by Per G Tube route 2 (two) times daily.      atorvastatin (LIPITOR) 80 MG tablet 1 tablet (80 mg total) by Per G  Tube route every evening.  Qty: 30 tablet, Refills: 0    Associated Diagnoses: Mixed hyperlipidemia      folic acid (FOLVITE) 1 MG tablet 1 tablet by Per G Tube route every morning.      furosemide (LASIX) 40 MG tablet 40 mg by Per G Tube route once daily.      HUMALOG KWIKPEN INSULIN 100 unit/mL pen Inject 0-20 Units into the skin As instructed (inject 4 times daily per sliding scale). 0 -149 = 0 units  150 - 199 = 4 units  200 - 249 = 8 units  250 - 299 = 12 units  300 - 349 = 16 units  350 - 1000 = 20 units Call MD      insulin (LANTUS SOLOSTAR U-100 INSULIN) glargine 100 units/mL SubQ pen Inject 35 Units into the skin once daily.      insulin lispro 100 unit/mL injection Inject 2 Units into the skin 3 (three) times daily before meals. 07:30  11:30  16:30      latanoprost 0.005 % ophthalmic solution Place 1 drop into both eyes once daily.  Qty: 7.5 mL, Refills: 6    Associated Diagnoses: Ocular hypertension, bilateral      levETIRAcetam (KEPPRA) 100 mg/mL Soln 500 mg by Per G Tube route 2 (two) times daily.      losartan (COZAAR) 100 MG tablet 1 tablet (100 mg total) by Per G Tube route once daily.    Comments: .      magnesium oxide (MAG-OX) 400 mg (241.3 mg magnesium) tablet 400 mg by Per G Tube route once daily.      metFORMIN (GLUCOPHAGE) 1000 MG tablet Take 1,000 mg by mouth 2 (two) times daily.      metoclopramide HCl (REGLAN) 5 mg/5 mL Soln 10 mg by Per G Tube route 3 (three) times daily.      metoprolol tartrate (LOPRESSOR) 25 MG tablet 25 mg by Per G Tube route 2 (two) times daily.      multivitamin (THERAGRAN) per tablet 1 tablet by Per G Tube route once daily.      omeprazole (PRILOSEC) 20 MG capsule Take 20 mg by mouth once daily. Per G-Tube      polyethylene glycol (GLYCOLAX) 17 gram PwPk 17 g by Per G Tube route once daily.      thiamine (VITAMIN B-1) 100 MG tablet 100 mg by Per G Tube route once daily.      zinc sulfate (ZINC-220 ORAL) 1 tablet by PEG Tube route Daily.      aspirin 81 MG Chew 1  tablet (81 mg total) by Per G Tube route once daily.    Associated Diagnoses: Cerebrovascular accident (CVA) of left pontine structure      baclofen (LIORESAL) 5 mg Tab tablet 5 mg by Per G Tube route every 8 (eight) hours as needed (muscle spasms).      cloNIDine (CATAPRES) 0.1 MG tablet 1 tablet (0.1 mg total) by Per G Tube route 3 (three) times daily.  Qty: 90 tablet, Refills: 11    Comments: .      tiotropium (SPIRIVA) 18 mcg inhalation capsule Inhale 1 capsule (18 mcg total) into the lungs once daily. Controller  Qty: 30 capsule, Refills: 0    Associated Diagnoses: Chronic obstructive pulmonary disease, unspecified COPD type           Follow up:       Immunizations Administered as of 5/8/2024       Name Date Dose VIS Date Route Exp Date    COVID-19, MRNA, LN-S, PF (Moderna) 8/24/2021 -- -- -- --    Lot: [id number]     External: Auto Reconciled From Outside Source     COVID-19, MRNA, LN-S, PF (Moderna) 7/28/2021 -- -- -- --    Lot: [id number]     External: Auto Reconciled From Outside Source     COVID-19, MRNA, LN-S, PF (Pfizer) (Purple Cap) 8/24/2021 0.3 mL 5/10/2021 Intramuscular --    Site: Right deltoid     : [employer]     Lot: [id number]     Comment: Adminis     COVID-19, MRNA, LN-S, PF (Pfizer) (Purple Cap) 8/24/2021 0.3 mL -- Intramuscular --    Site: Right deltoid     : [employer]     Lot: [id number]     COVID-19, MRNA, LN-S, PF (Pfizer) (Purple Cap) 7/28/2021 0.3 mL 5/10/2021 Intramuscular --    Site: Left deltoid     : [employer]     Lot: [id number]     Comment: Adminis     COVID-19, MRNA, LN-S, PF (Pfizer) (Purple Cap) 7/28/2021 0.3 mL -- Intramuscular --    Site: Left deltoid     : [employer]     Lot: [id number]                   Ann Alvarado NP

## 2024-05-08 NOTE — NURSING
PIV removed without difficulty, Catheter intact.  Midline in place per MD order.  Rectal tube pulled, pt tolerated well.  Report called to [facility].  [facility] to transport patient.

## 2024-05-09 NOTE — DISCHARGE SUMMARY
[facility] Medicine  Discharge Summary      Patient Name: Steff Johnson  MRN: [id number]  [facility]: [id number]  Patient Class: IP- Inpatient  Admission Date: 4/25/2024  Hospital Length of Stay: 13 days  Discharge Date and Time: 5/8/2024  6:57 PM  Attending Physician: Jose A Hannah  Discharging Provider: Megha Alvarado NP  Primary Care Provider: Cristina Ren MD    Primary Care Team: Networked reference to record PCT     HPI:   65 yo female with PMH of CVA with known baseline confusion and aphasia who was sent to the ER because of black drainage coming from around the PEG tube site and having black vomit.     On presentation to the ER, vitals showed 101/59, HR of 107, RR of 25 and fever of 102.3, spo2 of 95%. Cbc with wbc of 28 and anemia of 8.8. CMP showed acute renal failure with creatinine of of 2.5, anion gap of 19, hyponatermia of 133, potassium of 5.9. lactic acid of 6.1. Pt started to have projectile hematemesis. NGT was placed. Mathur was replaced after which pt developed hematuria. UA was grossly positive. C.diffi antigen is positive. EKG showed sinus tachycardia. CT C/A/P showed scattered nonspecific bronchiolitis in both lungs, with right lower lobes opacities potentially reflecting chronic bronchopneumonia, given similar appearance to CT of 12/11/2023. Pt was given the hyperkalemia procotol, 1.6 L of fluids, vanco/zosyn, and protonix 80 mg iv. She will be admitted to medicine for further management.     Procedure(s) (LRB):  EGD (ESOPHAGOGASTRODUODENOSCOPY) (N/A)      Hospital Course:   Ms. Johnson is a resident of a local nursing home she was admitted for GI hemorrhage, severe sepsis, COURTNEY, UTI, and C. Diff. while here, her labs and vital signs were monitored and she was maintained on telemetry.  She was placed on IV vancomycin, Merrem, and vancomycin per PEG while awaiting culture results.  Infectious Disease was consulted to assist with management.  She has a chronic Mathur  catheter, which was replaced, and she initially developed hematuria, likely from the trauma of the Mathur catheter being inserted, which did resolve.  She was placed on IV Protonix and GI was consulted.  She underwent an EGD which was negative for any bleeding and showed moderate/severe distal esophagitis and non-erosive gastritis.  Her anticoagulants, aspirin and Eliquis, were then resumed.  She initially had an NG tube, which was discontinued at some point.  Her continuous tube feedings via PEG were resume and she has tolerated.  She was placed on IV fluids her COURTNEY and her renal function improved.  Her urine culture started grew out Klebsiella (/MDRO) and Providencia stuartii and the Merrem was discontinued and she was started on ceftazidime-avibactam.  Her blood cultures were also positive with Staphylococcus haemolyticus and she was continued on IV vancomycin.  Her stool studies were negative for acute C diff infection however she continued on vancomycin per PEG empirically due to her risk for developing C diff. Repeat urine and blood cultures were collected.  He continued to have loose stools, which may have been due to the continuous tube feedings, and she was started on acidophilus per PEG.  She has a chronic sacral decubitus and Wound Care was consulted and followed her for her wound management.  Her wound did not appear to be contributory to her sepsis.  Repeat blood cultures were negative.  GI PCR revealed Salmonella and Levaquin was added.  Patient completed course of ceftazidime-avibactam while hospitalized.  Palliative Care was consulted and patient was made a DNR.  Hematology was consulted for thrombocytosis.  Id recommended Vancomycin one gram IV every 24 hours through 5/14, Levofloxacin 750 mg IV daily through 5/8, and Vancomycin oral 125 mg per PEG tube twice daily through May 14th and signed off. Patients met with palliative a second time and decided they would like to continue with medical  "treatment and not place patient on hospice.  Patient was cleared for discharge back to nursing home.  Case management followed for discharge plan.  Inpatient facility transfer orders placed in sent to facility which included the above-stated recommendations from Infectious Disease.      Patient seen and examined on day of discharge.  Family updated on discharge planning, and verbalized understanding.  Patient was discharged back to [facility] and transportation was arranged by case management.                  Goals of Care Treatment Preferences:  Code Status: DNR          What is most important right now is to focus on improvement in condition but with limits to invasive therapies.  Accordingly, we have decided that the best plan to meet the patient's goals includes continuing with treatment.      Consults:   Consults (From admission, onward)          Status Ordering Provider     Inpatient consult to Social Work/Case Management  Once        Provider:  (Not yet assigned)    Acknowledged MITCH HANNA     Inpatient consult to Midline team  Once        Provider:  (Not yet assigned)    Completed IMTCH HANNA     Inpatient consult to Hematology/Oncology  Once        Provider:  Prashanth Virk MD    Completed SUJATA GONZALEZ     Inpatient consult to Palliative Care  Once        Provider:  Leandro Renee MD    Completed SUJATA GONZALEZ     Pharmacy to dose Vancomycin consult  Once        Provider:  (Not yet assigned)   Placed in "And" Linked Group    Acknowledged MITCH HANNA     Inpatient consult to Nephrology  Once        Provider:  Irving Williamson MD    Completed KARIN WRIGHTA SLadonna     Inpatient consult to Infectious Diseases  Once        Provider:  Abraham Resendez MD    Completed MITRY, BOO S.     Inpatient consult to Gastroenterology  Once        Provider:  Julien Escobar III, MD    Completed ADRIANA BOO SLadonna     Inpatient consult to Gastroenterology  Once        Provider:  " Julien Escobar III, MD    Acknowledged RICHARD JAMES            No new Assessment & Plan notes have been filed under this hospital service since the last note was generated.  Service: Hospital Medicine    Final Active Diagnoses:    Diagnosis Date Noted POA    PRINCIPAL PROBLEM:  Severe sepsis [A41.9, R65.20] 04/25/2024 Yes    Granulocytic leukemoid reaction [D72.823] 05/06/2024 Unknown    Quadriplegia [G82.50] 04/27/2024 Yes     Chronic    GI hemorrhage [K92.2] 04/25/2024 Yes    Acute blood loss anemia [D62] 04/25/2024 Yes    Complicated UTI (urinary tract infection) [N39.0] 04/25/2024 Yes    Clostridium difficile infection [A49.8] 04/25/2024 Yes    Hypertension [I10] 04/25/2024 Yes    Type 2 diabetes mellitus with neurologic complication, with long-term current use of insulin [E11.49, Z79.4] 04/25/2024 Not Applicable    History of ischemic stroke [Z86.73] 04/25/2024 Not Applicable    Seizure disorder [G40.909] 02/23/2024 Yes    Bacteremia [R78.81] 12/07/2023 Yes    Sacral decubitus ulcer, stage III [L89.153] 10/25/2023 Yes    Thrombocytosis [D75.839] 10/25/2023 Yes    Aphasia [R47.01] 05/31/2023 Yes      Problems Resolved During this Admission:    Diagnosis Date Noted Date Resolved POA    Hematuria [R31.9] 04/25/2024 04/30/2024 Yes       Discharged Condition: stable    Disposition: Another Health Care Inst*    Follow Up:   Follow-up Information       Rae Ramirez MD Follow up in 4 week(s).    Specialty: Infectious Diseases  Contact information:  [address] [place] [address]  [address]  [place] [address] [phone]                           Patient Instructions:      Activity as tolerated       Significant Diagnostic Studies: Labs: CMP   Recent Labs   Lab 05/07/24  0607 05/08/24  0647   * 136   K 4.5 4.5    107   CO2 20* 23   * 181*   BUN 24* 24*   CREATININE 0.8 0.8   CALCIUM 8.5* 8.7   PROT 6.4 6.4   ALBUMIN 2.8* 2.7*   BILITOT 0.2 0.2   ALKPHOS 129 128   AST 23 19   ALT 23 22    ANIONGAP 9 6*   , CBC   Recent Labs   Lab 05/07/24  0607 05/08/24  0647   WBC 17.97* 16.73*   HGB 8.3* 8.3*   HCT 26.8* 25.3*   * 864*   , and All labs within the past 24 hours have been reviewed    Pending Diagnostic Studies:       Procedure Component Value Units Date/Time    EKG 12-lead [[id number]] Collected: 04/30/24 1438    Order Status: Sent Lab Status: In process Updated: 04/30/24 1553     QRS Duration 62 ms      [facility] QTC Calculation 442 ms     Narrative:      Test Reason : R07.9,    Vent. Rate : 079 BPM     Atrial Rate : 079 BPM     P-R Int : 140 ms          QRS Dur : 062 ms      QT Int : 386 ms       P-R-T Axes : 043 013 042 degrees     QTc Int : 442 ms    Normal sinus rhythm  Normal ECG  When compared with ECG of 25-APR-2024 12:25,  No significant change was found    Referred By: [email or web address]   SELF           Confirmed By:     Erythropoietin [[id number]] Collected: 05/07/24 0608    Order Status: Sent Lab Status: In process Updated: 05/07/24 0648    Specimen: Blood     Procalcitonin [[id number]] Collected: 04/25/24 1223    Order Status: Sent Lab Status: No result     Specimen: Blood     Vitamin B6 [[id number]] Collected: 05/07/24 0607    Order Status: Sent Lab Status: In process Updated: 05/07/24 0648    Specimen: Blood, Venous            Medications:  Reconciled Home Medications:      Medication List        START taking these medications      lactobacillus acidophilus & bulgar 100 million cell packet  Commonly known as: LACTINEX  Take 1 packet (1 each total) by mouth 2 (two) times daily. for 6 days     vancomycin 125 mg/5 mL Soln  5 mLs (125 mg total) by Per G Tube route 2 (two) times a day. for 6 days     vancomycin in dextrose 5 % 1 gram/250 mL Soln  Inject 250 mLs (1,000 mg total) into the vein once daily. for 6 days            CHANGE how you take these medications      latanoprost 0.005 % ophthalmic solution  Place 1 drop into both eyes once daily.  What changed: when to take this             CONTINUE taking these medications      allopurinoL 100 MG tablet  Commonly known as: ZYLOPRIM  100 mg by Per G Tube route once daily.     amLODIPine 10 MG tablet  Commonly known as: NORVASC  1 tablet (10 mg total) by Per G Tube route once daily.     ascorbic acid (vitamin C) 500 MG tablet  Commonly known as: VITAMIN C  500 mg by Per G Tube route 2 (two) times daily.     aspirin 81 MG Chew  1 tablet (81 mg total) by Per G Tube route once daily.     atorvastatin 80 MG tablet  Commonly known as: LIPITOR  1 tablet (80 mg total) by Per G Tube route every evening.     baclofen 5 mg Tab tablet  Commonly known as: LIORESAL  5 mg by Per G Tube route every 8 (eight) hours as needed (muscle spasms).     cloNIDine 0.1 MG tablet  Commonly known as: CATAPRES  1 tablet (0.1 mg total) by Per G Tube route 3 (three) times daily.     ELIQUIS 2.5 mg Tab  Generic drug: apixaban  2.5 mg by Per G Tube route 2 (two) times daily.     folic acid 1 MG tablet  Commonly known as: FOLVITE  1 tablet by Per G Tube route every morning.     furosemide 40 MG tablet  Commonly known as: LASIX  40 mg by Per G Tube route once daily.     * insulin lispro 100 unit/mL injection  Inject 2 Units into the skin 3 (three) times daily before meals. 07:30  11:30  16:30     * HumaLOG KwikPen Insulin 100 unit/mL pen  Generic drug: insulin lispro  Inject 0-20 Units into the skin As instructed (inject 4 times daily per sliding scale). 0 -149 = 0 units  150 - 199 = 4 units  200 - 249 = 8 units  250 - 299 = 12 units  300 - 349 = 16 units  350 - 1000 = 20 units Call MD     LANSTEPHANIE SOLOSTAR U-100 INSULIN 100 unit/mL (3 mL) Inpn pen  Generic drug: insulin glargine U-100 (Lantus)  Inject 35 Units into the skin once daily.     levETIRAcetam 100 mg/mL Soln  Commonly known as: KEPPRA  500 mg by Per G Tube route 2 (two) times daily.     losartan 100 MG tablet  Commonly known as: COZAAR  1 tablet (100 mg total) by Per G Tube route once daily.     magnesium oxide 400 mg (241.3  mg magnesium) tablet  Commonly known as: MAG-OX  400 mg by Per G Tube route once daily.     metFORMIN 1000 MG tablet  Commonly known as: GLUCOPHAGE  Take 1,000 mg by mouth 2 (two) times daily.     metoclopramide HCl 5 mg/5 mL Soln  Commonly known as: REGLAN  10 mg by Per G Tube route 3 (three) times daily.     metoprolol tartrate 25 MG tablet  Commonly known as: LOPRESSOR  25 mg by Per G Tube route 2 (two) times daily.     multivitamin per tablet  Commonly known as: THERAGRAN  1 tablet by Per G Tube route once daily.     omeprazole 20 MG capsule  Commonly known as: PRILOSEC  Take 20 mg by mouth once daily. Per G-Tube     SPIRIVA WITH HANDIHALER 18 mcg inhalation capsule  Generic drug: tiotropium  Inhale 1 capsule (18 mcg total) into the lungs once daily. Controller     VITAMIN B-1 100 MG tablet  Generic drug: thiamine  100 mg by Per G Tube route once daily.     ZINC-220 ORAL  1 tablet by PEG Tube route Daily.           * This list has 2 medication(s) that are the same as other medications prescribed for you. Read the directions carefully, and ask your doctor or other care provider to review them with you.                STOP taking these medications      polyethylene glycol 17 gram Pwpk  Commonly known as: GLYCOLAX              Indwelling Lines/Drains at time of discharge:   Lines/Drains/Airways       Drain  Duration                  Gastrostomy/Enterostomy Gastrostomy tube w/ balloon LUQ feeding -- days         Fecal Incontinence  04/25/24 1700 13 days         Urethral Catheter 04/25/24 1400 16 Fr. 13 days                    Time spent on the discharge of patient: 35 minutes         Ann Alvarado NP  Department of Hospital Medicine  [facility]

## 2024-05-10 ENCOUNTER — HOSPITAL ENCOUNTER (EMERGENCY)
Facility: HOSPITAL | Age: 64
Discharge: HOME | End: 2024-05-10
Attending: EMERGENCY MEDICINE
Payer: MEDICARE

## 2024-05-10 VITALS
HEART RATE: 82 BPM | RESPIRATION RATE: 16 BRPM | WEIGHT: 117 LBS | TEMPERATURE: 98 F | DIASTOLIC BLOOD PRESSURE: 52 MMHG | SYSTOLIC BLOOD PRESSURE: 116 MMHG | BODY MASS INDEX: 23.63 KG/M2 | OXYGEN SATURATION: 99 %

## 2024-05-10 DIAGNOSIS — K94.23 PEG TUBE MALFUNCTION: ICD-10-CM

## 2024-05-10 LAB
EPO SERPL-ACNC: 28.9 MIU/ML (ref 2.6–18.5)
VIT B6 SERPL-MCNC: 3.5 UG/L (ref 3.4–65.2)

## 2024-05-10 PROCEDURE — 43762 RPLC GTUBE NO REVJ TRC: CPT

## 2024-05-10 PROCEDURE — 25500020 PHARM REV CODE 255: Performed by: EMERGENCY MEDICINE

## 2024-05-10 PROCEDURE — 99284 EMERGENCY DEPT VISIT MOD MDM: CPT | Mod: 25

## 2024-05-10 RX ADMIN — DIATRIZOATE MEGLUMINE AND DIATRIZOATE SODIUM 30 ML: 600; 100 SOLUTION ORAL; RECTAL at 06:05

## 2024-05-10 NOTE — ED PROVIDER NOTES
Encounter Date: 5/10/2024       History     Chief Complaint   Patient presents with    Peg Tube Problem     Pt from [facility] for peg tube needing to be replaced     64-year-old female brought from [facility] due to a PEG tube coming out.  Patient has a history of diabetes, hypertension, CVA, COPD.  She has not communicative.  No nausea vomiting.  No fever.  It was reported that the PEG tube came out and a proximally 5:00 a.m. this morning.      Review of patient's allergies indicates:  No Known Allergies  Past Medical History:   Diagnosis Date    Arthritis     Complete tear of left rotator cuff 11/09/2017    COPD (chronic obstructive pulmonary disease)     COVID-19 infection 07/02/2021    Diabetes mellitus     H/o Cerebrovascular accident (CVA) of left pontine structure 12/12/2019    Hypertension     Personal history of colonic polyps     Stroke     Wears glasses      Past Surgical History:   Procedure Laterality Date    COLONOSCOPY N/A 4/11/2017    Procedure: COLONOSCOPY;  Surgeon: Jared Antonio MD;  Location: [facility];  Service: Endoscopy;  Laterality: N/A;    CYSTOSCOPY W/ RETROGRADES N/A 12/14/2023    Procedure: CYSTOSCOPY, WITH RETROGRADE PYELOGRAM;  Surgeon: Sergio Soria MD;  Location: [facility] OR;  Service: Urology;  Laterality: N/A;    ECHOCARDIOGRAM,TRANSESOPHAGEAL N/A 12/12/2023    Procedure: Transesophageal echo (GT) intra-procedure log documentation;  Surgeon: Antoine Rivera MD;  Location: [facility] CATH/EP LAB;  Service: Cardiology;  Laterality: N/A;    ESOPHAGOGASTRODUODENOSCOPY N/A 2/25/2024    Procedure: EGD (ESOPHAGOGASTRODUODENOSCOPY);  Surgeon: Alexandru May MD;  Location: [facility] ENDO;  Service: Endoscopy;  Laterality: N/A;    ESOPHAGOGASTRODUODENOSCOPY N/A 4/26/2024    Procedure: EGD (ESOPHAGOGASTRODUODENOSCOPY);  Surgeon: Julien Escobar III, MD;  Location: [facility];  Service: Endoscopy;  Laterality: N/A;    HYSTERECTOMY      INSERTION OF CATHETER N/A 12/14/2023    Procedure:  INSERTION, CATHETER;  Surgeon: Sergio Soria MD;  Location: [facility] OR;  Service: Urology;  Laterality: N/A;    OOPHORECTOMY      SHOULDER SURGERY      R    TRANSESOPHAGEAL ECHOCARDIOGRAM WITH POSSIBLE CARDIOVERSION (GT W/ POSS CARDIOVERSION) N/A 5/4/2023    Procedure: Transesophageal echo (GT) intra-procedure log documentation;  Surgeon: Blade Phillip MD;  Location: [facility] CATH/EP LAB;  Service: Cardiology;  Laterality: N/A;     Family History   Problem Relation Name Age of Onset    Diabetes Mother      Asthma Mother      Hypertension Mother      Diabetes Father      Diabetes Brother      Hypertension Brother      Anemia Daughter      Glaucoma Neg Hx      Macular degeneration Neg Hx      Retinal detachment Neg Hx       Social History     Tobacco Use    Smoking status: Never    Smokeless tobacco: Never   Substance Use Topics    Alcohol use: No    Drug use: No     Review of Systems   Constitutional:  Negative for activity change, diaphoresis and fever.   HENT: Negative.     Respiratory: Negative.     Cardiovascular: Negative.    Gastrointestinal:  Negative for constipation, diarrhea, nausea and vomiting.   All other systems reviewed and are negative.      Physical Exam     Initial Vitals [05/10/24 0555]   BP Pulse Resp Temp SpO2   (!) 116/52 82 16 98.1 °F (36.7 °C) 99 %      MAP       --         Physical Exam    Vitals reviewed.  Constitutional:   Nonverbal   Cardiovascular:  Normal rate.           Pulmonary/Chest: Breath sounds normal.   Abdominal: Abdomen is soft. She exhibits no distension and no mass.   Bowel sounds hypoactive.  Peg site noted.  No bleeding or apparent tenderness There is no guarding.     Skin: Skin is warm and dry.         ED Course   Procedures  Labs Reviewed - No data to display       Imaging Results              X-Ray Abdomen AP 1 View (KUB) (Final result)  Result time 05/10/24 06:26:38      Final result by Serg Brambila MD (05/10/24 06:26:38)                   Impression:       1.  Peg tube with balloon inflated in the body of the stomach.    2.  Oral contrast in the stomach and proximal small bowel without finding to specifically suggest a leak.    3.  Nonspecific, nonobstructive bowel gas pattern.      Electronically signed by: Serg Brambila  Date:    05/10/2024  Time:    06:26               Narrative:    CLINICAL HISTORY:  ([id number])63 y/o  (1960) F    Gastrostomy malfunction    TECHNIQUE:  (A#[id number], exam time 5/10/2024 6:18)    XR ABDOMEN AP 1 VIEW [id number] 30 mL dilute oral Gastroview was administered by the technologist without aid or assistance to the radiologist.  The contrast was administered via the PEG tube.    COMPARISON:  Radiograph from 04/30/2024    FINDINGS:  The lung bases show mild faint interstitial opacity bilaterally, similar to the previous radiograph.    Oral contrast is seen in the PEG tube stomach and proximal small bowel.  No gross contrast extravasation intra-abdominal free air or pneumatosis is present within the limits of this exam.  There is scattered gas and stool in the large and small bowel, the largest loop of small bowel is 2.5 cm with no abnormally dilated or distended loops of bowel to suggest obstruction. Osseous structures show no acute abnormality.                                       Medications   diatrizoate meglumineand-diatrizoate sodium (GASTROVIEW) solution 30 mL (30 mLs Per G Tube Given 5/10/24 0615)     Medical Decision Making  Amount and/or Complexity of Data Reviewed  Radiology: ordered. Decision-making details documented in ED Course.    Risk  Prescription drug management.              Attending Attestation:             Attending ED Notes:   ED course and MDM.  The patient was PEG site was prepped and a 22 [place] catheter reinserted.  Contrast study done showing placement within lumen of the bowel.  The patient will be able to be sent back to [place] to continue normal feedings.      ED Course as of 05/10/24 0942   Fri May 10,  2024 0613 S/o to dr sin to f/u kub [EF]   0615 X-Ray Abdomen AP 1 View (KUB) [LG]      ED Course User Index  [EF] Ambrose Oshea MD  [LG] Matthew Sin Jr., MD                           Clinical Impression:  Final diagnoses:  [K94.23] PEG tube malfunction          ED Disposition Condition    Discharge Stable          ED Prescriptions    None       Follow-up Information       Follow up With Specialties Details Why Contact Info    Cristina Ren MD Family Medicine  As needed [address] [facility] [address]  [phone]               Matthew Sin Jr., MD  05/10/24 3970

## 2024-05-10 NOTE — DISCHARGE INSTRUCTIONS
Ensure that tube does not leak, that it is patent for feedings and fluid.  Watch vomiting.  Return to the ER if needed

## 2024-05-21 LAB
OHS QRS DURATION: 62 MS
OHS QTC CALCULATION: 442 MS

## 2024-05-27 PROBLEM — N39.0 UTI (URINARY TRACT INFECTION): Status: RESOLVED | Noted: 2024-02-23 | Resolved: 2024-05-27

## 2024-06-17 ENCOUNTER — HOSPITAL ENCOUNTER (EMERGENCY)
Facility: HOSPITAL | Age: 64
Discharge: HOME | End: 2024-06-18
Attending: EMERGENCY MEDICINE
Payer: MEDICARE

## 2024-06-17 DIAGNOSIS — Z93.1 S/P PERCUTANEOUS ENDOSCOPIC GASTROSTOMY (PEG) TUBE PLACEMENT: Primary | ICD-10-CM

## 2024-06-17 PROCEDURE — 25500020 PHARM REV CODE 255: Performed by: EMERGENCY MEDICINE

## 2024-06-17 PROCEDURE — 99284 EMERGENCY DEPT VISIT MOD MDM: CPT | Mod: 25

## 2024-06-17 PROCEDURE — 43762 RPLC GTUBE NO REVJ TRC: CPT

## 2024-06-17 RX ADMIN — DIATRIZOATE MEGLUMINE AND DIATRIZOATE SODIUM 30 ML: 600; 100 SOLUTION ORAL; RECTAL at 09:06

## 2024-06-18 VITALS
RESPIRATION RATE: 16 BRPM | DIASTOLIC BLOOD PRESSURE: 79 MMHG | HEIGHT: 59 IN | BODY MASS INDEX: 23.56 KG/M2 | WEIGHT: 116.88 LBS | TEMPERATURE: 98 F | HEART RATE: 84 BPM | SYSTOLIC BLOOD PRESSURE: 145 MMHG | OXYGEN SATURATION: 94 %

## 2024-06-18 NOTE — ED PROVIDER NOTES
Encounter Date: 6/17/2024       History   No chief complaint on file.    Chief complaint is accidental PEG tube removal by patient.  This occurred approximately an hour to ago.  Patient with PEG tube with the ambulance steam.  No complaints otherwise.        Review of patient's allergies indicates:  No Known Allergies  Past Medical History:   Diagnosis Date    Arthritis     Complete tear of left rotator cuff 11/09/2017    COPD (chronic obstructive pulmonary disease)     COVID-19 infection 07/02/2021    Diabetes mellitus     H/o Cerebrovascular accident (CVA) of left pontine structure 12/12/2019    Hypertension     Personal history of colonic polyps     Stroke     Wears glasses      Past Surgical History:   Procedure Laterality Date    COLONOSCOPY N/A 4/11/2017    Procedure: COLONOSCOPY;  Surgeon: Jared Antonio MD;  Location: [facility] ENDO;  Service: Endoscopy;  Laterality: N/A;    CYSTOSCOPY W/ RETROGRADES N/A 12/14/2023    Procedure: CYSTOSCOPY, WITH RETROGRADE PYELOGRAM;  Surgeon: Sergio Soria MD;  Location: [facility];  Service: Urology;  Laterality: N/A;    ECHOCARDIOGRAM,TRANSESOPHAGEAL N/A 12/12/2023    Procedure: Transesophageal echo (GT) intra-procedure log documentation;  Surgeon: Antoine Rivera MD;  Location: [facility] CATH/EP LAB;  Service: Cardiology;  Laterality: N/A;    ESOPHAGOGASTRODUODENOSCOPY N/A 2/25/2024    Procedure: EGD (ESOPHAGOGASTRODUODENOSCOPY);  Surgeon: Alexandru May MD;  Location: [facility] ENDO;  Service: Endoscopy;  Laterality: N/A;    ESOPHAGOGASTRODUODENOSCOPY N/A 4/26/2024    Procedure: EGD (ESOPHAGOGASTRODUODENOSCOPY);  Surgeon: Julien Escobar III, MD;  Location: [facility] ENDO;  Service: Endoscopy;  Laterality: N/A;    HYSTERECTOMY      INSERTION OF CATHETER N/A 12/14/2023    Procedure: INSERTION, CATHETER;  Surgeon: Sergio Soria MD;  Location: [facility] OR;  Service: Urology;  Laterality: N/A;    OOPHORECTOMY      SHOULDER SURGERY      R    TRANSESOPHAGEAL ECHOCARDIOGRAM  WITH POSSIBLE CARDIOVERSION (GT W/ POSS CARDIOVERSION) N/A 5/4/2023    Procedure: Transesophageal echo (GT) intra-procedure log documentation;  Surgeon: Blade Phillip MD;  Location: [facility] CATH/EP LAB;  Service: Cardiology;  Laterality: N/A;     Family History   Problem Relation Name Age of Onset    Diabetes Mother      Asthma Mother      Hypertension Mother      Diabetes Father      Diabetes Brother      Hypertension Brother      Anemia Daughter      Glaucoma Neg Hx      Macular degeneration Neg Hx      Retinal detachment Neg Hx       Social History     Tobacco Use    Smoking status: Never    Smokeless tobacco: Never   Substance Use Topics    Alcohol use: No    Drug use: No     Review of Systems    Physical Exam     Initial Vitals   BP Pulse Resp Temp SpO2   06/17/24 1920 06/17/24 1920 06/17/24 1920 06/17/24 1924 06/17/24 1920   (!) 147/75 81 16 98.2 °F (36.8 °C) (!) 92 %      MAP       --                Physical Exam    ED Course   Feeding Tube    Date/Time: 6/17/2024 8:11 PM  Location procedure was performed: [facility] EMERGENCY DEPARTMENT    Performed by: Aydee Vazquez MD  Authorized by: Aydee Vazquez MD  Comments: PEG tube replaced by me without difficulty.  We will now order a post insertion steady.        Labs Reviewed - No data to display       Imaging Results              X-Ray Abdomen AP 1 View (Final result)  Result time 06/17/24 22:05:52   Procedure changed from FL Upper GI Water Soluable     Final result by Prashanth Ruiz MD (06/17/24 22:05:52)                   Impression:      As above.      Electronically signed by: Prashanth Ruiz MD  Date:    06/17/2024  Time:    22:05               Narrative:    EXAMINATION:  XR ABDOMEN AP 1 VIEW    CLINICAL HISTORY:  peg tube placement;    TECHNIQUE:  AP View(s) of the abdomen was performed.    COMPARISON:  05/10/2024    FINDINGS:  30 cc of water-soluble oral contrast was administered via the patient's gastrostomy tube.    Please note an initial   image was not acquired.  Contrast material is present within the gastric lumen.  No definite extraluminal contrast material appreciated.  There is mild gaseous distention of the visualized bowel.  Chronic degenerative changes are noted of the visualized bony pelvis.                                       Medications   diatrizoate meglumineand-diatrizoate sodium (GASTROVIEW) solution 30 mL (30 mLs Per G Tube Given 6/17/24 2128)     Medical Decision Making  PEG tube replaced without difficulty.  X-ray reveals tube in good placement. Aydee Vazquez MD  10:53 PM 06/17/2024          Amount and/or Complexity of Data Reviewed  Radiology: ordered.    Risk  Prescription drug management.                                      Clinical Impression:  Final diagnoses:  [Z93.1] S/P percutaneous endoscopic gastrostomy (PEG) tube placement (Primary)          ED Disposition Condition    Discharge Stable          ED Prescriptions    None       Follow-up Information    None          Aydee Vazquez MD  06/17/24 7018

## 2024-06-18 NOTE — ED NOTES
Assisted Dr. Vazquez with PEG tube replacement. Hands washed, sterile gloves applied. 22 G-tube placed, balloon filled with 20 ml air. Pt tolerated well, vitals stable. Xray ordered to confirm placement.

## 2024-06-19 ENCOUNTER — HOSPITAL ENCOUNTER (EMERGENCY)
Facility: HOSPITAL | Age: 64
Discharge: HOME | End: 2024-06-19
Attending: EMERGENCY MEDICINE
Payer: MEDICARE

## 2024-06-19 VITALS
TEMPERATURE: 98 F | WEIGHT: 120 LBS | BODY MASS INDEX: 24.19 KG/M2 | HEIGHT: 59 IN | SYSTOLIC BLOOD PRESSURE: 132 MMHG | RESPIRATION RATE: 18 BRPM | DIASTOLIC BLOOD PRESSURE: 72 MMHG | HEART RATE: 89 BPM | OXYGEN SATURATION: 96 %

## 2024-06-19 VITALS
DIASTOLIC BLOOD PRESSURE: 85 MMHG | HEART RATE: 94 BPM | OXYGEN SATURATION: 95 % | SYSTOLIC BLOOD PRESSURE: 143 MMHG | TEMPERATURE: 98 F | RESPIRATION RATE: 15 BRPM | BODY MASS INDEX: 23.6 KG/M2 | WEIGHT: 116.81 LBS

## 2024-06-19 DIAGNOSIS — Z43.1 PEG (PERCUTANEOUS ENDOSCOPIC GASTROSTOMY) ADJUSTMENT/REPLACEMENT/REMOVAL: ICD-10-CM

## 2024-06-19 DIAGNOSIS — K94.23 PEG TUBE MALFUNCTION: Primary | ICD-10-CM

## 2024-06-19 PROCEDURE — 99284 EMERGENCY DEPT VISIT MOD MDM: CPT | Mod: 25

## 2024-06-19 PROCEDURE — 43762 RPLC GTUBE NO REVJ TRC: CPT

## 2024-06-19 NOTE — ED PROVIDER NOTES
Encounter Date: 6/19/2024       History     Chief Complaint   Patient presents with    peg tube out     Pt arrived by ems, peg tube out     64-year-old female transferred from [facility] for evaluation of a PEG tube dislodgement.  Patient was seen at [facility] this morning and had PEG tube replaced.  Patient did have a 22 Serbian PEG tube which was placed without difficulty.  It was insufflated with air however at the nursing facility nurses noticed that the PEG tube had become dislodged and noticed that the balloon was not up.  Patient transported back for repeat evaluation.  Currently at this time no history of nausea, vomiting, no abdominal pain or abdominal distention was reported.  No history of fever reported.      Review of patient's allergies indicates:  No Known Allergies  Past Medical History:   Diagnosis Date    Arthritis     Complete tear of left rotator cuff 11/09/2017    COPD (chronic obstructive pulmonary disease)     COVID-19 infection 07/02/2021    Diabetes mellitus     H/o Cerebrovascular accident (CVA) of left pontine structure 12/12/2019    Hypertension     Personal history of colonic polyps     Stroke     Wears glasses      Past Surgical History:   Procedure Laterality Date    COLONOSCOPY N/A 4/11/2017    Procedure: COLONOSCOPY;  Surgeon: Jared Antonio MD;  Location: [facility];  Service: Endoscopy;  Laterality: N/A;    CYSTOSCOPY W/ RETROGRADES N/A 12/14/2023    Procedure: CYSTOSCOPY, WITH RETROGRADE PYELOGRAM;  Surgeon: Sergio Soria MD;  Location: [facility] OR;  Service: Urology;  Laterality: N/A;    ECHOCARDIOGRAM,TRANSESOPHAGEAL N/A 12/12/2023    Procedure: Transesophageal echo (GT) intra-procedure log documentation;  Surgeon: Antoine Rivera MD;  Location: [facility] CATH/EP LAB;  Service: Cardiology;  Laterality: N/A;    ESOPHAGOGASTRODUODENOSCOPY N/A 2/25/2024    Procedure: EGD (ESOPHAGOGASTRODUODENOSCOPY);  Surgeon: Alexandru May MD;  Location: [facility] ENDO;  Service:  Endoscopy;  Laterality: N/A;    ESOPHAGOGASTRODUODENOSCOPY N/A 4/26/2024    Procedure: EGD (ESOPHAGOGASTRODUODENOSCOPY);  Surgeon: Julien Escobar III, MD;  Location: [facility] ENDO;  Service: Endoscopy;  Laterality: N/A;    HYSTERECTOMY      INSERTION OF CATHETER N/A 12/14/2023    Procedure: INSERTION, CATHETER;  Surgeon: Sergio Soria MD;  Location: [facility] OR;  Service: Urology;  Laterality: N/A;    OOPHORECTOMY      SHOULDER SURGERY      R    TRANSESOPHAGEAL ECHOCARDIOGRAM WITH POSSIBLE CARDIOVERSION (GT W/ POSS CARDIOVERSION) N/A 5/4/2023    Procedure: Transesophageal echo (GT) intra-procedure log documentation;  Surgeon: Blade Phillip MD;  Location: [facility] CATH/EP LAB;  Service: Cardiology;  Laterality: N/A;     Family History   Problem Relation Name Age of Onset    Diabetes Mother      Asthma Mother      Hypertension Mother      Diabetes Father      Diabetes Brother      Hypertension Brother      Anemia Daughter      Glaucoma Neg Hx      Macular degeneration Neg Hx      Retinal detachment Neg Hx       Social History     Tobacco Use    Smoking status: Never    Smokeless tobacco: Never   Substance Use Topics    Alcohol use: No    Drug use: No     Review of Systems   Unable to perform ROS: Dementia       Physical Exam     Initial Vitals [06/19/24 1804]   BP Pulse Resp Temp SpO2   132/72 89 18 98.1 °F (36.7 °C) 96 %      MAP       --         Physical Exam    Nursing note and vitals reviewed.  Constitutional: She appears well-developed and well-nourished.   Patient contracted, no acute distress   HENT:   Head: Normocephalic and atraumatic.   Nose: Nose normal.   Mouth/Throat: Oropharynx is clear and moist.   Eyes: Conjunctivae and EOM are normal. Pupils are equal, round, and reactive to light.   Neck: Neck supple. No thyromegaly present. No tracheal deviation present.   Normal range of motion.  Cardiovascular:  Normal rate, regular rhythm, normal heart sounds and intact distal pulses.     Exam reveals no  gallop and no friction rub.       No murmur heard.  Pulmonary/Chest: No stridor. No respiratory distress.   Course bilateral breath sounds no adventitious sounds   Abdominal: Abdomen is soft. Bowel sounds are normal. She exhibits no distension and no mass. There is no abdominal tenderness. There is no rebound and no guarding.   Musculoskeletal:         General: No edema. Normal range of motion.      Cervical back: Normal range of motion and neck supple.     Lymphadenopathy:     She has no cervical adenopathy.   Neurological: She is alert and oriented to person, place, and time. She has normal strength and normal reflexes. GCS score is 15. GCS eye subscore is 4. GCS verbal subscore is 5. GCS motor subscore is 6.   Skin: Skin is warm and dry. Capillary refill takes less than 2 seconds.   Psychiatric: She has a normal mood and affect.         ED Course   Procedures  Labs Reviewed - No data to display       Imaging Results              X-Ray Abdomen Portable (In process)                      Medications - No data to display  Medical Decision Making  Amount and/or Complexity of Data Reviewed  Radiology: ordered.                          Medical Decision Making:   Initial Assessment:   64-year-old female transferred from [facility] for evaluation of a PEG tube dislodgement.  Patient was seen at [facility] this morning and had PEG tube replaced.  Patient did have a 22 Wolof PEG tube which was placed without difficulty.  It was insufflated with air however at the nursing facility nurses noticed that the PEG tube had become dislodged and noticed that the balloon was not up.  Patient transported back for repeat evaluation.  Currently at this time no history of nausea, vomiting, no abdominal pain or abdominal distention was reported.  No history of fever reported.    Differential Diagnosis:   PEG tube dislodgement  Clinical Tests:   Radiological Study: Ordered and Reviewed  ED Management:  Patient seen evaluated  emergency department.  Currently this time patient did have Mathur catheter tube in place by nursing home staff.  Patient was prepped in normal clean/sterile fashion.  Patient did have a 22 Armenian PEG tube placed and was filled with a 20 cc sterile water balloon.  Patient tolerated well without complications.  Gastrografin KUB was obtained showed good placement of PEG tube.  Currently this time patient was allowed to be discharged back to nursing care facility.             Clinical Impression:  Final diagnoses:  [Z43.1] PEG (percutaneous endoscopic gastrostomy) adjustment/replacement/removal                 Gato Sepulveda MD  06/19/24 1496

## 2024-06-19 NOTE — ED PROVIDER NOTES
Encounter Date: 6/19/2024       History     Chief Complaint   Patient presents with    PEG tube replacement     Emergent evaluation of a 64-year-old female brought in by EMS from [facility] due to peg tube displacement patient was a 22 Palauan PEG tube that was pulled out they noticed this around 9:00 p.m. per nursing staff.  They placed a 16 Palauan Mathur catheter in the gastrostomy site.  No bleeding no redness no abdominal pain      Review of patient's allergies indicates:  No Known Allergies  Past Medical History:   Diagnosis Date    Arthritis     Complete tear of left rotator cuff 11/09/2017    COPD (chronic obstructive pulmonary disease)     COVID-19 infection 07/02/2021    Diabetes mellitus     H/o Cerebrovascular accident (CVA) of left pontine structure 12/12/2019    Hypertension     Personal history of colonic polyps     Stroke     Wears glasses      Past Surgical History:   Procedure Laterality Date    COLONOSCOPY N/A 4/11/2017    Procedure: COLONOSCOPY;  Surgeon: Jared Antonio MD;  Location: [facility];  Service: Endoscopy;  Laterality: N/A;    CYSTOSCOPY W/ RETROGRADES N/A 12/14/2023    Procedure: CYSTOSCOPY, WITH RETROGRADE PYELOGRAM;  Surgeon: Sergio Soria MD;  Location: [facility] OR;  Service: Urology;  Laterality: N/A;    ECHOCARDIOGRAM,TRANSESOPHAGEAL N/A 12/12/2023    Procedure: Transesophageal echo (GT) intra-procedure log documentation;  Surgeon: Antoine Rivera MD;  Location: [facility] CATH/EP LAB;  Service: Cardiology;  Laterality: N/A;    ESOPHAGOGASTRODUODENOSCOPY N/A 2/25/2024    Procedure: EGD (ESOPHAGOGASTRODUODENOSCOPY);  Surgeon: Alexandru May MD;  Location: [facility];  Service: Endoscopy;  Laterality: N/A;    ESOPHAGOGASTRODUODENOSCOPY N/A 4/26/2024    Procedure: EGD (ESOPHAGOGASTRODUODENOSCOPY);  Surgeon: Julien Escobar III, MD;  Location: [facility];  Service: Endoscopy;  Laterality: N/A;    HYSTERECTOMY      INSERTION OF CATHETER N/A 12/14/2023    Procedure:  INSERTION, CATHETER;  Surgeon: Sergio Soria MD;  Location: [facility] OR;  Service: Urology;  Laterality: N/A;    OOPHORECTOMY      SHOULDER SURGERY      R    TRANSESOPHAGEAL ECHOCARDIOGRAM WITH POSSIBLE CARDIOVERSION (GT W/ POSS CARDIOVERSION) N/A 5/4/2023    Procedure: Transesophageal echo (GT) intra-procedure log documentation;  Surgeon: Blade Phillip MD;  Location: [facility] CATH/EP LAB;  Service: Cardiology;  Laterality: N/A;     Family History   Problem Relation Name Age of Onset    Diabetes Mother      Asthma Mother      Hypertension Mother      Diabetes Father      Diabetes Brother      Hypertension Brother      Anemia Daughter      Glaucoma Neg Hx      Macular degeneration Neg Hx      Retinal detachment Neg Hx       Social History     Tobacco Use    Smoking status: Never    Smokeless tobacco: Never   Substance Use Topics    Alcohol use: No    Drug use: No     Review of Systems   Unable to perform ROS: Patient nonverbal       Physical Exam     Initial Vitals [06/19/24 0117]   BP Pulse Resp Temp SpO2   (!) 143/85 94 15 98.2 °F (36.8 °C) 95 %      MAP       --         Physical Exam    Nursing note and vitals reviewed.  Constitutional: She appears well-developed and well-nourished. She is not diaphoretic. No distress.   Cardiovascular:            143/85 pulse 94   Pulmonary/Chest: No respiratory distress.   95% on room air respirations 15   Abdominal: Abdomen is soft. Bowel sounds are normal. She exhibits no distension and no mass. There is no abdominal tenderness.   PEG tube to left upper abdomen with a 16 Mozambican Mathur catheter in place.  No erythema around PEG tube site.  No drainage There is no rebound and no guarding.           ED Course   Procedures  Labs Reviewed - No data to display       Imaging Results    None          Medications - No data to display  Medical Decision Making  Emergent evaluation of a 64-year-old female brought in by EMS from [facility] due to peg tube displacement patient was  a 22 Belizean PEG tube that was pulled out they noticed this around 9:00 p.m. per nursing staff.  They placed a 16 Belizean Mathur catheter in the gastrostomy site.  No bleeding no redness no abdominal pain  On physical exam patient was in no distress she had normal vital sign she was nonverbal soft abdomen no signs of tenderness no signs of trauma from PEG tube removal.  Mathur catheter 16 Belizean was in the PEG tube site.  This is able to be removed without difficulty.  Twenty-two Belizean PEG tube was replaced.  20 cc of air was put into balloon.  And he was secured.  We are able to suctioned gastric contents through the port.  Patient appears to have no pain she will be discharged back to the facility  MDM    Patient presents for emergent evaluation of acute PEG tube came out around 9:00 p.m. that poses a threat to life and/or bodily function.   Differential diagnosis includes but was not limited to peg tube removal, false track, bleeding or trauma peg tube site.   In the ED patient found to have acute PEG tube malfunction    Discharge MDM     Patient was managed in the ED with PEG tube replacement   The response to treatment was good.    Patient was discharged in stable condition.  Detailed return precautions discussed.  Patient was told to follow up with primary care physician or specialist based on their diagnosis  Tonia Goldman MD                                        Clinical Impression:  Final diagnoses:  [K94.23] PEG tube malfunction (Primary)          ED Disposition Condition    Discharge Stable          ED Prescriptions    None       Follow-up Information       Follow up With Specialties Details Why Contact Info Additional Information    [facility] ED Emergency Medicine Go to  If symptoms worsen [address] [place] [address] [address] [place] [id number] 1st floor    Cristina Ren MD Family Medicine Schedule an appointment as soon as possible for a visit   [address] [place] [address]  Mary ESTEVEZ  [phone]  [phone]                Tonia Goldman MD  06/19/24 0138

## 2024-07-29 PROBLEM — N39.0 COMPLICATED UTI (URINARY TRACT INFECTION): Status: RESOLVED | Noted: 2024-04-25 | Resolved: 2024-07-29

## 2024-08-05 PROBLEM — N17.9 ACUTE KIDNEY INJURY: Status: RESOLVED | Noted: 2024-04-25 | Resolved: 2024-08-05

## 2024-08-05 PROBLEM — K92.2 GI HEMORRHAGE: Status: RESOLVED | Noted: 2024-04-25 | Resolved: 2024-08-05

## 2024-08-05 PROBLEM — A41.9 SEVERE SEPSIS: Status: RESOLVED | Noted: 2024-04-25 | Resolved: 2024-08-05

## 2024-08-05 PROBLEM — R65.20 SEVERE SEPSIS: Status: RESOLVED | Noted: 2024-04-25 | Resolved: 2024-08-05

## 2024-08-28 DIAGNOSIS — E11.9 TYPE 2 DIABETES MELLITUS WITHOUT COMPLICATION: ICD-10-CM

## 2024-10-02 DIAGNOSIS — E11.9 TYPE 2 DIABETES MELLITUS WITHOUT COMPLICATION: ICD-10-CM

## 2024-10-30 DIAGNOSIS — E11.9 TYPE 2 DIABETES MELLITUS WITHOUT COMPLICATION: ICD-10-CM

## (undated) DEVICE — MAT QUICK 40X30 FLOOR FLUID LF

## (undated) DEVICE — SOL IRR NACL .9% 3000ML

## (undated) DEVICE — GLOVE SURG ULTRA TOUCH 7.5

## (undated) DEVICE — GOWN X-LARGE

## (undated) DEVICE — NDL SPINAL 18GX3.5 SPINOCAN

## (undated) DEVICE — SPONGE GAUZE 16PLY 4X4

## (undated) DEVICE — SOLUTION IRRI H2O BOTTLE 1000ML

## (undated) DEVICE — SEE MEDLINE ITEM 152487

## (undated) DEVICE — GLOVE SURG ULTRA TOUCH 8

## (undated) DEVICE — PACK CUSTOM UNIV BASIN SLI

## (undated) DEVICE — NDL SUREFIRE SCORPION RC

## (undated) DEVICE — CUTTER AGGRESSIVE PLUS 3.5MM

## (undated) DEVICE — PACK CYSTOSCOPY III

## (undated) DEVICE — DRESSING N ADH OIL EMUL 3X3

## (undated) DEVICE — DRAPE STERI-DRAPE 1000 17X11IN

## (undated) DEVICE — SCRUB BACTOSHIELD 134439

## (undated) DEVICE — SEE MEDLINE ITEM 157166

## (undated) DEVICE — TAPE CLOTH SOFT MEDIPORE 4IN

## (undated) DEVICE — SEE MEDLINE ITEM 146313

## (undated) DEVICE — DRAPE INCISE IOBAN 2 23X17IN

## (undated) DEVICE — GLOVE #7.5 BIOGEL 30475

## (undated) DEVICE — GOWN B1 X-LG X-LONG

## (undated) DEVICE — GAUZE SPONGE BULKEE 6X6.75IN

## (undated) DEVICE — SEE MEDLINE ITEM 157116

## (undated) DEVICE — SEE MEDLINE ITEM 152622

## (undated) DEVICE — SEE MEDLINE ITEM 146270

## (undated) DEVICE — UNDERGLOVE BIOGEL PI MICRO BLUE SZ 6.5

## (undated) DEVICE — DRAPE STERI U-SHAPED 47X51IN

## (undated) DEVICE — TUBING SUCTION 12' ST2003

## (undated) DEVICE — CANNULA PASSPORT 8 MM X 3CM

## (undated) DEVICE — CONTAINER SPECIMEN STRL 4OZ

## (undated) DEVICE — SEE MEDLINE ITEM 157131

## (undated) DEVICE — CUTTER MENISCUS AGGRESSIVE 4.0

## (undated) DEVICE — PACK SHOULDER

## (undated) DEVICE — Device

## (undated) DEVICE — PAD ABD 8X10 STERILE

## (undated) DEVICE — CATHETER URETERAL CONE TIP 5F X 70CM

## (undated) DEVICE — GUIDEWIRE URO STRGHT 3CM TIP.035X150CM

## (undated) DEVICE — COVER SURG LIGHT HANDLE

## (undated) DEVICE — TUBING CYSTO DOUBLE 654301

## (undated) DEVICE — APPLICATOR CHLORAPREP ORN 26ML

## (undated) DEVICE — SYRINGE 30ML 302832

## (undated) DEVICE — SLEEVE SCD EXPRESS CALF MEDIUM

## (undated) DEVICE — ELECTRODE PENCIL W/ROCKER NDL

## (undated) DEVICE — PROBE ABLATION RF ARTHSCP 3.5

## (undated) DEVICE — SUT ETHILON 3-0 FS-1 30

## (undated) DEVICE — STRAP OR TABLE 5IN X 72IN

## (undated) DEVICE — COLD THER SYS W/PAD UNV RH

## (undated) DEVICE — BLADE SURG CARBON STEEL SZ11

## (undated) DEVICE — SEE MEDLINE ITEM 157216

## (undated) DEVICE — SEE MEDLINE ITEM 146420

## (undated) DEVICE — ELECTRODE MENISCECTOMY 90 DEG

## (undated) DEVICE — SKINMARKER W/RULER DEVON

## (undated) DEVICE — ELECTRODE REM PLYHSV RETURN 9

## (undated) DEVICE — SOL 9P NACL IRR PIC IL

## (undated) DEVICE — SYS LABEL CORRECT MED

## (undated) DEVICE — LINER SUCTION 3000CC

## (undated) DEVICE — UNDERGLOVES BIOGEL PI SIZE 7.5

## (undated) DEVICE — SOLUTION H2O IRRIGATION 3000ML R8006

## (undated) DEVICE — TUBING ARTHROSCOPY

## (undated) DEVICE — SLEEVE LATERAL TRACTION ARM

## (undated) DEVICE — DRAPE STERI INSTRUMENT 1018

## (undated) DEVICE — NDL BOX COUNTER